# Patient Record
Sex: MALE | Race: WHITE | NOT HISPANIC OR LATINO | Employment: PART TIME | URBAN - METROPOLITAN AREA
[De-identification: names, ages, dates, MRNs, and addresses within clinical notes are randomized per-mention and may not be internally consistent; named-entity substitution may affect disease eponyms.]

---

## 2018-05-22 LAB
ABSOL LYMPHOCYTES (HISTORICAL): 2.5 K/UL (ref 0.5–4)
ALBUMIN SERPL BCP-MCNC: 4.3 G/DL (ref 3–5.2)
ALP SERPL-CCNC: 65 U/L (ref 43–122)
ALT SERPL W P-5'-P-CCNC: 34 U/L (ref 9–52)
ANION GAP SERPL CALCULATED.3IONS-SCNC: 10 MMOL/L (ref 5–14)
AST SERPL W P-5'-P-CCNC: 15 U/L (ref 17–59)
BASOPHILS # BLD AUTO: 0 K/UL (ref 0–0.1)
BASOPHILS # BLD AUTO: 1 % (ref 0–1)
BILIRUB SERPL-MCNC: 0.6 MG/DL
BUN SERPL-MCNC: 20 MG/DL (ref 5–25)
CALCIUM SERPL-MCNC: 9.5 MG/DL (ref 8.4–10.2)
CHLORIDE SERPL-SCNC: 104 MEQ/L (ref 97–108)
CO2 SERPL-SCNC: 28 MMOL/L (ref 22–30)
CREATINE, SERUM (HISTORICAL): 0.86 MG/DL (ref 0.7–1.5)
DEPRECATED RDW RBC AUTO: 14.3 %
EGFR (HISTORICAL): >60 ML/MIN/1.73 M2
EOSINOPHIL # BLD AUTO: 0.2 K/UL (ref 0–0.4)
EOSINOPHIL NFR BLD AUTO: 2 % (ref 0–6)
ERYTHROCYTE SEDIMENTATION RATE (HISTORICAL): 13 MM (ref 1–20)
GLUCOSE SERPL-MCNC: 110 MG/DL (ref 70–99)
HCT VFR BLD AUTO: 44.1 % (ref 41–53)
HGB BLD-MCNC: 14.5 G/DL (ref 13.5–17.5)
LYMPHOCYTES NFR BLD AUTO: 34 % (ref 25–45)
MCH RBC QN AUTO: 31.3 PG (ref 26–34)
MCHC RBC AUTO-ENTMCNC: 32.9 % (ref 31–36)
MCV RBC AUTO: 95 FL (ref 80–100)
MONOCYTES # BLD AUTO: 0.6 K/UL (ref 0.2–0.9)
MONOCYTES NFR BLD AUTO: 8 % (ref 1–10)
NEUTROPHILS ABS COUNT (HISTORICAL): 4.2 K/UL (ref 1.8–7.8)
NEUTS SEG NFR BLD AUTO: 55 % (ref 45–65)
PLATELET # BLD AUTO: 202 K/MCL (ref 150–450)
POTASSIUM SERPL-SCNC: 5.1 MEQ/L (ref 3.6–5)
RBC # BLD AUTO: 4.64 M/MCL (ref 4.5–5.9)
SODIUM SERPL-SCNC: 141 MEQ/L (ref 137–147)
TOTAL PROTEIN (HISTORICAL): 7 G/DL (ref 5.9–8.4)
WBC # BLD AUTO: 7.4 K/MCL (ref 4.5–11)

## 2018-05-23 LAB
EST. AVERAGE GLUCOSE BLD GHB EST-MCNC: 180 MG/DL
HBA1C MFR BLD HPLC: 7.9 %

## 2018-05-24 LAB — B BURGDORFERI AB,IGM/WB (HISTORICAL): 0.18

## 2018-06-26 ENCOUNTER — OFFICE VISIT (OUTPATIENT)
Dept: FAMILY MEDICINE CLINIC | Facility: CLINIC | Age: 68
End: 2018-06-26
Payer: MEDICARE

## 2018-06-26 VITALS
WEIGHT: 239.6 LBS | HEART RATE: 71 BPM | TEMPERATURE: 96.9 F | DIASTOLIC BLOOD PRESSURE: 80 MMHG | HEIGHT: 76 IN | OXYGEN SATURATION: 97 % | BODY MASS INDEX: 29.18 KG/M2 | SYSTOLIC BLOOD PRESSURE: 140 MMHG

## 2018-06-26 DIAGNOSIS — N52.1 ERECTILE DISORDER DUE TO MEDICAL CONDITION IN MALE: Primary | ICD-10-CM

## 2018-06-26 DIAGNOSIS — E78.5 HYPERLIPIDEMIA, UNSPECIFIED HYPERLIPIDEMIA TYPE: ICD-10-CM

## 2018-06-26 PROCEDURE — 99213 OFFICE O/P EST LOW 20 MIN: CPT | Performed by: FAMILY MEDICINE

## 2018-06-26 RX ORDER — METFORMIN HYDROCHLORIDE 750 MG/1
TABLET, EXTENDED RELEASE ORAL
COMMUNITY
Start: 2017-06-27 | End: 2018-09-05 | Stop reason: SDUPTHER

## 2018-06-26 RX ORDER — ACYCLOVIR 50 MG/G
OINTMENT TOPICAL
COMMUNITY
Start: 2014-02-04 | End: 2020-12-23

## 2018-06-26 RX ORDER — VARDENAFIL HYDROCHLORIDE 20 MG/1
20 TABLET ORAL DAILY PRN
Qty: 10 TABLET | Refills: 0 | Status: SHIPPED | OUTPATIENT
Start: 2018-06-26 | End: 2019-11-13

## 2018-06-26 RX ORDER — AMLODIPINE BESYLATE 5 MG/1
TABLET ORAL EVERY 24 HOURS
COMMUNITY
Start: 2017-09-19 | End: 2018-06-30 | Stop reason: SDUPTHER

## 2018-06-26 RX ORDER — SIMVASTATIN 40 MG
TABLET ORAL EVERY 24 HOURS
COMMUNITY
Start: 2017-09-19 | End: 2018-06-26

## 2018-06-26 RX ORDER — GLIMEPIRIDE 4 MG/1
TABLET ORAL EVERY 12 HOURS
COMMUNITY
Start: 2017-04-10 | End: 2018-07-06 | Stop reason: SDUPTHER

## 2018-06-26 RX ORDER — ROSUVASTATIN CALCIUM 5 MG/1
5 TABLET, COATED ORAL DAILY
Qty: 90 TABLET | Refills: 3 | Status: SHIPPED | OUTPATIENT
Start: 2018-06-26 | End: 2018-11-09 | Stop reason: HOSPADM

## 2018-06-26 RX ORDER — LISINOPRIL 40 MG/1
TABLET ORAL
COMMUNITY
Start: 2017-09-19 | End: 2018-07-02 | Stop reason: SDUPTHER

## 2018-06-26 RX ORDER — VARDENAFIL HYDROCHLORIDE 20 MG/1
20 TABLET ORAL DAILY PRN
Qty: 10 TABLET | Refills: 0 | Status: SHIPPED | OUTPATIENT
Start: 2018-06-26 | End: 2018-11-06 | Stop reason: SDUPTHER

## 2018-06-26 RX ORDER — BLOOD-GLUCOSE METER
EACH MISCELLANEOUS
COMMUNITY
Start: 2016-03-14 | End: 2022-03-04 | Stop reason: CLARIF

## 2018-06-26 NOTE — PROGRESS NOTES
Assessment/Plan:      Diagnoses and all orders for this visit:    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  -     vardenafil (LEVITRA) 20 MG tablet; Take 1 tablet (20 mg total) by mouth daily as needed for erectile dysfunction  -     vardenafil (LEVITRA) 20 MG tablet; Take 1 tablet (20 mg total) by mouth daily as needed for erectile dysfunction    Hyperlipidemia, unspecified hyperlipidemia type  -     rosuvastatin (CRESTOR) 5 mg tablet; Take 1 tablet (5 mg total) by mouth daily    Other orders  -     Blood Glucose Monitoring Suppl (ACCU-CHEK JYOTI SMARTVIEW) w/Device KIT; use to check Blood Sugar as needed  -     glucose blood test strip; tests once or twice  daily  by fingerstick route  -     acyclovir (ZOVIRAX) 5 % ointment; every 3 (three) hours  -     amLODIPine (NORVASC) 5 mg tablet; every 24 hours  -     aspirin 81 MG tablet; every 24 hours  -     glimepiride (AMARYL) 4 mg tablet; Every 12 hours  -     sitaGLIPtin (JANUVIA) 100 mg tablet; every 24 hours  -     lisinopril (ZESTRIL) 40 mg tablet; TAKE 1 TABLET (40MG) BY ORAL ROUTE EVERY DAY  -     metFORMIN (GLUCOPHAGE-XR) 750 mg 24 hr tablet; take 2 tablet by oral route  every day  -     Discontinue: simvastatin (ZOCOR) 40 mg tablet; every 24 hours          Subjective:med re ck    Myalgias gone since stopping simva      Patient ID: Naty Clark  is a 79 y o  male      HPI    Review of Systems      Objective:     Physical Exam

## 2018-06-30 DIAGNOSIS — I10 ESSENTIAL HYPERTENSION: ICD-10-CM

## 2018-06-30 DIAGNOSIS — I10 ESSENTIAL HYPERTENSION: Primary | ICD-10-CM

## 2018-06-30 RX ORDER — AMLODIPINE BESYLATE 5 MG/1
TABLET ORAL
Qty: 90 TABLET | Refills: 0 | Status: SHIPPED | OUTPATIENT
Start: 2018-06-30 | End: 2018-06-30 | Stop reason: SDUPTHER

## 2018-06-30 RX ORDER — AMLODIPINE BESYLATE 5 MG/1
5 TABLET ORAL DAILY
Qty: 90 TABLET | Refills: 3 | Status: SHIPPED | OUTPATIENT
Start: 2018-06-30 | End: 2019-01-04 | Stop reason: SDUPTHER

## 2018-07-02 DIAGNOSIS — I10 ESSENTIAL HYPERTENSION: Primary | ICD-10-CM

## 2018-07-03 RX ORDER — LISINOPRIL 40 MG/1
TABLET ORAL
Qty: 90 TABLET | Refills: 0 | Status: SHIPPED | OUTPATIENT
Start: 2018-07-03 | End: 2018-09-29 | Stop reason: SDUPTHER

## 2018-07-06 DIAGNOSIS — E11.9 TYPE 2 DIABETES MELLITUS WITHOUT COMPLICATION, WITHOUT LONG-TERM CURRENT USE OF INSULIN (HCC): Primary | ICD-10-CM

## 2018-07-06 RX ORDER — GLIMEPIRIDE 4 MG/1
TABLET ORAL
Qty: 180 TABLET | Refills: 3 | Status: SHIPPED | OUTPATIENT
Start: 2018-07-06 | End: 2019-07-26 | Stop reason: SDUPTHER

## 2018-07-16 DIAGNOSIS — E11.9 TYPE 2 DIABETES MELLITUS WITHOUT COMPLICATION, UNSPECIFIED WHETHER LONG TERM INSULIN USE (HCC): Primary | ICD-10-CM

## 2018-07-16 RX ORDER — BLOOD SUGAR DIAGNOSTIC
STRIP MISCELLANEOUS
Qty: 100 EACH | Refills: 12 | Status: SHIPPED | OUTPATIENT
Start: 2018-07-16 | End: 2018-07-17 | Stop reason: SDUPTHER

## 2018-07-16 RX ORDER — SITAGLIPTIN 100 MG/1
TABLET, FILM COATED ORAL
Qty: 90 TABLET | Refills: 0 | Status: SHIPPED | OUTPATIENT
Start: 2018-07-16 | End: 2018-07-17 | Stop reason: SDUPTHER

## 2018-07-17 DIAGNOSIS — E11.9 TYPE 2 DIABETES MELLITUS WITHOUT COMPLICATION, UNSPECIFIED WHETHER LONG TERM INSULIN USE (HCC): ICD-10-CM

## 2018-08-14 ENCOUNTER — TELEPHONE (OUTPATIENT)
Dept: FAMILY MEDICINE CLINIC | Facility: CLINIC | Age: 68
End: 2018-08-14

## 2018-08-14 NOTE — TELEPHONE ENCOUNTER
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

## 2018-08-15 ENCOUNTER — TELEPHONE (OUTPATIENT)
Dept: FAMILY MEDICINE CLINIC | Facility: CLINIC | Age: 68
End: 2018-08-15

## 2018-08-15 NOTE — TELEPHONE ENCOUNTER
Pt was at the foot doctor they are stating that he has cellulitis pt  Is on antibiotic cephalexin 500mg for 10 days four a day he was told to follow up with dr Jhonna Perkins  I told patient he might need an apt he didn't want to schedule can you please [phone]

## 2018-09-05 ENCOUNTER — OFFICE VISIT (OUTPATIENT)
Dept: FAMILY MEDICINE CLINIC | Facility: CLINIC | Age: 68
End: 2018-09-05
Payer: MEDICARE

## 2018-09-05 VITALS
DIASTOLIC BLOOD PRESSURE: 80 MMHG | WEIGHT: 234.1 LBS | HEIGHT: 76 IN | SYSTOLIC BLOOD PRESSURE: 156 MMHG | RESPIRATION RATE: 16 BRPM | BODY MASS INDEX: 28.51 KG/M2

## 2018-09-05 DIAGNOSIS — L03.115 CELLULITIS OF RIGHT LOWER EXTREMITY: Primary | ICD-10-CM

## 2018-09-05 DIAGNOSIS — E11.9 TYPE 2 DIABETES MELLITUS WITHOUT COMPLICATION, UNSPECIFIED WHETHER LONG TERM INSULIN USE (HCC): ICD-10-CM

## 2018-09-05 PROCEDURE — G0008 ADMIN INFLUENZA VIRUS VAC: HCPCS

## 2018-09-05 PROCEDURE — 99213 OFFICE O/P EST LOW 20 MIN: CPT | Performed by: FAMILY MEDICINE

## 2018-09-05 PROCEDURE — 90662 IIV NO PRSV INCREASED AG IM: CPT

## 2018-09-05 RX ORDER — METFORMIN HYDROCHLORIDE 750 MG/1
TABLET, EXTENDED RELEASE ORAL
Qty: 180 TABLET | Refills: 3 | Status: SHIPPED | OUTPATIENT
Start: 2018-09-05 | End: 2019-09-25 | Stop reason: SDUPTHER

## 2018-09-05 NOTE — PATIENT INSTRUCTIONS
CONTINUE YOUR CARE WITH THE PODIATRIST  CONTINUE THE CURRENT MEDICINES FOR THE DIABETIC CONTROL  WE ARE LOOKING FOR AN AVERAGE FASTING BLOOD SUGAR AROUND 140 FOR YOU

## 2018-09-05 NOTE — PROGRESS NOTES
Assessment/Plan:    No problem-specific Assessment & Plan notes found for this encounter  Diagnoses and all orders for this visit:    Cellulitis of right lower extremity    Other orders  -     aspirin 81 MG tablet; Take 81 mg by mouth daily  -     Acetaminophen (TYLENOL) 325 MG CAPS; Take 650 mg by mouth every 6 (six) hours          Subjective:      Patient ID: Jessica Brownlee  is a 79 y o  male  [id number] [facility] [address] STAY FOR CELLULITIS OF THE RIGHT LOWER EXTREMITY  IT IS IMPROVED NICELY AND HE IS OFF ANTIBIOTICS NOW FOR SEVERAL DAYS  HIS BLOOD SUGARS WERE A BIT HIGH DURING THAT TREATMENT PROGRAM BUT NOW HAVE BEGUN TO IMPROVE  HE HAS NO CURRENT SYMPTOMS IN THE LEG AT THIS TIME  HE HAS A CHRONIC WOUND OF HIS RIGHT FOOT THAT HE IS FOLLOWING WITH THE PODIATRY PEOPLE          The following portions of the patient's history were reviewed and updated as appropriate: allergies, current medications, past family history, past medical history, past social history, past surgical history and problem list     Review of Systems      Objective:  Vitals:    09/05/18 1138   BP: 156/80   BP Location: Left arm   Patient Position: Sitting   Cuff Size: Standard   Resp: 16   Weight: 106 kg (234 lb 1 6 oz)   Height: 6' 4" (1 93 m)      Physical Exam   Skin:   CELLULITIS IS HEALED ; NO LOCAL INFECTION OF FOOT

## 2018-09-17 ENCOUNTER — TRANSCRIBE ORDERS (OUTPATIENT)
Dept: ADMINISTRATIVE | Facility: HOSPITAL | Age: 68
End: 2018-09-17

## 2018-09-17 DIAGNOSIS — L97.309 DIABETIC ULCER OF ANKLE (HCC): Primary | ICD-10-CM

## 2018-09-17 DIAGNOSIS — E11.622 DIABETIC ULCER OF ANKLE (HCC): Primary | ICD-10-CM

## 2018-09-18 ENCOUNTER — HOSPITAL ENCOUNTER (OUTPATIENT)
Dept: RADIOLOGY | Age: 68
Discharge: HOME | End: 2018-09-18
Payer: MEDICARE

## 2018-09-18 DIAGNOSIS — E11.622 DIABETIC ULCER OF ANKLE (HCC): ICD-10-CM

## 2018-09-18 DIAGNOSIS — L97.309 DIABETIC ULCER OF ANKLE (HCC): ICD-10-CM

## 2018-09-18 PROCEDURE — 73718 MRI LOWER EXTREMITY W/O DYE: CPT

## 2018-09-29 DIAGNOSIS — I10 ESSENTIAL HYPERTENSION: ICD-10-CM

## 2018-09-30 RX ORDER — LISINOPRIL 40 MG/1
TABLET ORAL
Qty: 90 TABLET | Refills: 3 | Status: SHIPPED | OUTPATIENT
Start: 2018-09-30 | End: 2019-01-04 | Stop reason: SDUPTHER

## 2018-11-06 ENCOUNTER — APPOINTMENT (EMERGENCY)
Dept: NON INVASIVE DIAGNOSTICS | Facility: HOSPITAL | Age: 68
DRG: 623 | End: 2018-11-06
Payer: MEDICARE

## 2018-11-06 ENCOUNTER — APPOINTMENT (EMERGENCY)
Dept: RADIOLOGY | Facility: HOSPITAL | Age: 68
DRG: 623 | End: 2018-11-06
Payer: MEDICARE

## 2018-11-06 ENCOUNTER — HOSPITAL ENCOUNTER (INPATIENT)
Facility: HOSPITAL | Age: 68
LOS: 2 days | Discharge: HOME | DRG: 623 | End: 2018-11-09
Attending: EMERGENCY MEDICINE | Admitting: INTERNAL MEDICINE
Payer: MEDICARE

## 2018-11-06 DIAGNOSIS — L03.115 CELLULITIS OF RIGHT LOWER EXTREMITY: Primary | ICD-10-CM

## 2018-11-06 DIAGNOSIS — I48.91 ATRIAL FIBRILLATION WITH RAPID VENTRICULAR RESPONSE (HCC): ICD-10-CM

## 2018-11-06 DIAGNOSIS — L89.893 PRESSURE INJURY OF RIGHT FOOT, STAGE 3 (HCC): ICD-10-CM

## 2018-11-06 DIAGNOSIS — I48.91 ATRIAL FIBRILLATION WITH RVR (HCC): ICD-10-CM

## 2018-11-06 DIAGNOSIS — E78.5 HYPERLIPIDEMIA, UNSPECIFIED HYPERLIPIDEMIA TYPE: Chronic | ICD-10-CM

## 2018-11-06 DIAGNOSIS — R91.1 RIGHT MIDDLE LOBE PULMONARY NODULE: ICD-10-CM

## 2018-11-06 PROBLEM — E11.40 TYPE 2 DIABETES MELLITUS WITH DIABETIC NEUROPATHY (HCC): Chronic | Status: ACTIVE | Noted: 2018-11-06

## 2018-11-06 PROBLEM — Z86.73 HISTORY OF TIA (TRANSIENT ISCHEMIC ATTACK): Status: ACTIVE | Noted: 2017-04-25

## 2018-11-06 PROBLEM — L03.90 CELLULITIS: Status: ACTIVE | Noted: 2018-11-06

## 2018-11-06 PROBLEM — N17.9 ACUTE KIDNEY INJURY (HCC): Status: ACTIVE | Noted: 2018-11-06

## 2018-11-06 LAB
ALBUMIN SERPL BCP-MCNC: 3.8 G/DL (ref 3.5–5)
ALP SERPL-CCNC: 79 U/L (ref 46–116)
ALT SERPL W P-5'-P-CCNC: 36 U/L (ref 12–78)
ANION GAP SERPL CALCULATED.3IONS-SCNC: 6 MMOL/L (ref 4–13)
APTT PPP: 29 SECONDS (ref 24–36)
AST SERPL W P-5'-P-CCNC: 14 U/L (ref 5–45)
ATRIAL RATE: 258 BPM
BASOPHILS # BLD AUTO: 0.04 THOUSANDS/ΜL (ref 0–0.1)
BASOPHILS NFR BLD AUTO: 0 % (ref 0–1)
BILIRUB SERPL-MCNC: 0.4 MG/DL (ref 0.2–1)
BUN SERPL-MCNC: 19 MG/DL (ref 5–25)
CALCIUM SERPL-MCNC: 9.2 MG/DL (ref 8.3–10.1)
CHLORIDE SERPL-SCNC: 105 MMOL/L (ref 100–108)
CO2 SERPL-SCNC: 25 MMOL/L (ref 21–32)
CREAT SERPL-MCNC: 1.36 MG/DL (ref 0.6–1.3)
EOSINOPHIL # BLD AUTO: 0.08 THOUSAND/ΜL (ref 0–0.61)
EOSINOPHIL NFR BLD AUTO: 1 % (ref 0–6)
ERYTHROCYTE [DISTWIDTH] IN BLOOD BY AUTOMATED COUNT: 13.7 % (ref 11.6–15.1)
GFR SERPL CREATININE-BSD FRML MDRD: 54 ML/MIN/1.73SQ M
GLUCOSE SERPL-MCNC: 127 MG/DL (ref 65–140)
GLUCOSE SERPL-MCNC: 163 MG/DL (ref 65–140)
HCT VFR BLD AUTO: 45 % (ref 36.5–49.3)
HGB BLD-MCNC: 14.5 G/DL (ref 12–17)
IMM GRANULOCYTES # BLD AUTO: 0.06 THOUSAND/UL (ref 0–0.2)
IMM GRANULOCYTES NFR BLD AUTO: 1 % (ref 0–2)
INR PPP: 1.07 (ref 0.86–1.17)
LYMPHOCYTES # BLD AUTO: 1.76 THOUSANDS/ΜL (ref 0.6–4.47)
LYMPHOCYTES NFR BLD AUTO: 13 % (ref 14–44)
MAGNESIUM SERPL-MCNC: 2.2 MG/DL (ref 1.6–2.6)
MCH RBC QN AUTO: 30.7 PG (ref 26.8–34.3)
MCHC RBC AUTO-ENTMCNC: 32.2 G/DL (ref 31.4–37.4)
MCV RBC AUTO: 95 FL (ref 82–98)
MONOCYTES # BLD AUTO: 0.65 THOUSAND/ΜL (ref 0.17–1.22)
MONOCYTES NFR BLD AUTO: 5 % (ref 4–12)
NEUTROPHILS # BLD AUTO: 10.58 THOUSANDS/ΜL (ref 1.85–7.62)
NEUTS SEG NFR BLD AUTO: 80 % (ref 43–75)
NRBC BLD AUTO-RTO: 0 /100 WBCS
NT-PROBNP SERPL-MCNC: 573 PG/ML
P AXIS: 0 DEGREES
PLATELET # BLD AUTO: 192 THOUSANDS/UL (ref 149–390)
PLATELET # BLD AUTO: 214 THOUSANDS/UL (ref 149–390)
PMV BLD AUTO: 10.7 FL (ref 8.9–12.7)
PMV BLD AUTO: 10.9 FL (ref 8.9–12.7)
POTASSIUM SERPL-SCNC: 3.9 MMOL/L (ref 3.5–5.3)
PROT SERPL-MCNC: 7.9 G/DL (ref 6.4–8.2)
PROTHROMBIN TIME: 14 SECONDS (ref 11.8–14.2)
QRS AXIS: 73 DEGREES
QRSD INTERVAL: 100 MS
QT INTERVAL: 288 MS
QTC INTERVAL: 418 MS
RBC # BLD AUTO: 4.72 MILLION/UL (ref 3.88–5.62)
SODIUM SERPL-SCNC: 136 MMOL/L (ref 136–145)
T WAVE AXIS: 7 DEGREES
TROPONIN I SERPL-MCNC: <0.02 NG/ML
VENTRICULAR RATE: 127 BPM
WBC # BLD AUTO: 13.17 THOUSAND/UL (ref 4.31–10.16)

## 2018-11-06 PROCEDURE — 85049 AUTOMATED PLATELET COUNT: CPT | Performed by: INTERNAL MEDICINE

## 2018-11-06 PROCEDURE — 1124F ACP DISCUSS-NO DSCNMKR DOCD: CPT | Performed by: INTERNAL MEDICINE

## 2018-11-06 PROCEDURE — 87040 BLOOD CULTURE FOR BACTERIA: CPT | Performed by: EMERGENCY MEDICINE

## 2018-11-06 PROCEDURE — 85025 COMPLETE CBC W/AUTO DIFF WBC: CPT | Performed by: EMERGENCY MEDICINE

## 2018-11-06 PROCEDURE — 93005 ELECTROCARDIOGRAM TRACING: CPT

## 2018-11-06 PROCEDURE — 93010 ELECTROCARDIOGRAM REPORT: CPT | Performed by: INTERNAL MEDICINE

## 2018-11-06 PROCEDURE — 83735 ASSAY OF MAGNESIUM: CPT | Performed by: EMERGENCY MEDICINE

## 2018-11-06 PROCEDURE — 71275 CT ANGIOGRAPHY CHEST: CPT

## 2018-11-06 PROCEDURE — 84484 ASSAY OF TROPONIN QUANT: CPT | Performed by: EMERGENCY MEDICINE

## 2018-11-06 PROCEDURE — 83880 ASSAY OF NATRIURETIC PEPTIDE: CPT | Performed by: EMERGENCY MEDICINE

## 2018-11-06 PROCEDURE — 93971 EXTREMITY STUDY: CPT

## 2018-11-06 PROCEDURE — 96375 TX/PRO/DX INJ NEW DRUG ADDON: CPT

## 2018-11-06 PROCEDURE — 96361 HYDRATE IV INFUSION ADD-ON: CPT

## 2018-11-06 PROCEDURE — 36415 COLL VENOUS BLD VENIPUNCTURE: CPT | Performed by: EMERGENCY MEDICINE

## 2018-11-06 PROCEDURE — 80053 COMPREHEN METABOLIC PANEL: CPT | Performed by: EMERGENCY MEDICINE

## 2018-11-06 PROCEDURE — 85610 PROTHROMBIN TIME: CPT | Performed by: EMERGENCY MEDICINE

## 2018-11-06 PROCEDURE — 85730 THROMBOPLASTIN TIME PARTIAL: CPT | Performed by: EMERGENCY MEDICINE

## 2018-11-06 PROCEDURE — 99285 EMERGENCY DEPT VISIT HI MDM: CPT

## 2018-11-06 PROCEDURE — 82948 REAGENT STRIP/BLOOD GLUCOSE: CPT

## 2018-11-06 PROCEDURE — 96365 THER/PROPH/DIAG IV INF INIT: CPT

## 2018-11-06 PROCEDURE — 99223 1ST HOSP IP/OBS HIGH 75: CPT | Performed by: INTERNAL MEDICINE

## 2018-11-06 RX ORDER — LISINOPRIL 20 MG/1
40 TABLET ORAL DAILY
Status: DISCONTINUED | OUTPATIENT
Start: 2018-11-07 | End: 2018-11-09 | Stop reason: HOSPADM

## 2018-11-06 RX ORDER — HEPARIN SODIUM 5000 [USP'U]/ML
5000 INJECTION, SOLUTION INTRAVENOUS; SUBCUTANEOUS EVERY 8 HOURS SCHEDULED
Status: DISCONTINUED | OUTPATIENT
Start: 2018-11-06 | End: 2018-11-07

## 2018-11-06 RX ORDER — AMLODIPINE BESYLATE 5 MG/1
5 TABLET ORAL DAILY
Status: DISCONTINUED | OUTPATIENT
Start: 2018-11-07 | End: 2018-11-09 | Stop reason: HOSPADM

## 2018-11-06 RX ORDER — SODIUM CHLORIDE, SODIUM LACTATE, POTASSIUM CHLORIDE, CALCIUM CHLORIDE 600; 310; 30; 20 MG/100ML; MG/100ML; MG/100ML; MG/100ML
100 INJECTION, SOLUTION INTRAVENOUS CONTINUOUS
Status: DISPENSED | OUTPATIENT
Start: 2018-11-06 | End: 2018-11-07

## 2018-11-06 RX ORDER — ACETAMINOPHEN 325 MG/1
650 TABLET ORAL EVERY 6 HOURS PRN
Status: DISCONTINUED | OUTPATIENT
Start: 2018-11-06 | End: 2018-11-09 | Stop reason: HOSPADM

## 2018-11-06 RX ORDER — PRAVASTATIN SODIUM 40 MG
40 TABLET ORAL
Status: DISCONTINUED | OUTPATIENT
Start: 2018-11-07 | End: 2018-11-08

## 2018-11-06 RX ORDER — CEPHALEXIN 500 MG/1
500 CAPSULE ORAL 3 TIMES DAILY
Refills: 0 | COMMUNITY
Start: 2018-10-09 | End: 2018-11-09 | Stop reason: HOSPADM

## 2018-11-06 RX ORDER — ASPIRIN 81 MG/1
81 TABLET, CHEWABLE ORAL DAILY
Status: DISCONTINUED | OUTPATIENT
Start: 2018-11-07 | End: 2018-11-09 | Stop reason: HOSPADM

## 2018-11-06 RX ORDER — DILTIAZEM HYDROCHLORIDE 5 MG/ML
20 INJECTION INTRAVENOUS ONCE
Status: COMPLETED | OUTPATIENT
Start: 2018-11-06 | End: 2018-11-06

## 2018-11-06 RX ADMIN — IOHEXOL 85 ML: 350 INJECTION, SOLUTION INTRAVENOUS at 15:24

## 2018-11-06 RX ADMIN — HEPARIN SODIUM 5000 UNITS: 5000 INJECTION INTRAVENOUS; SUBCUTANEOUS at 21:10

## 2018-11-06 RX ADMIN — DILTIAZEM HYDROCHLORIDE 30 MG: 30 TABLET, FILM COATED ORAL at 15:58

## 2018-11-06 RX ADMIN — SODIUM CHLORIDE 1000 ML: 0.9 INJECTION, SOLUTION INTRAVENOUS at 15:39

## 2018-11-06 RX ADMIN — METOPROLOL TARTRATE 25 MG: 25 TABLET, FILM COATED ORAL at 20:36

## 2018-11-06 RX ADMIN — VANCOMYCIN HYDROCHLORIDE 1500 MG: 10 INJECTION, POWDER, LYOPHILIZED, FOR SOLUTION INTRAVENOUS at 21:42

## 2018-11-06 RX ADMIN — CEFAZOLIN SODIUM 2000 MG: 2 SOLUTION INTRAVENOUS at 15:06

## 2018-11-06 RX ADMIN — SODIUM CHLORIDE, SODIUM LACTATE, POTASSIUM CHLORIDE, AND CALCIUM CHLORIDE 100 ML/HR: .6; .31; .03; .02 INJECTION, SOLUTION INTRAVENOUS at 21:08

## 2018-11-06 RX ADMIN — SODIUM CHLORIDE 1000 ML: 0.9 INJECTION, SOLUTION INTRAVENOUS at 14:33

## 2018-11-06 RX ADMIN — DILTIAZEM HYDROCHLORIDE 20 MG: 5 INJECTION INTRAVENOUS at 14:33

## 2018-11-06 NOTE — ED ATTENDING ATTESTATION
Joaquina Rice MD, saw and evaluated the patient  I have discussed the patient with the resident/non-physician practitioner and agree with the resident's/non-physician practitioner's findings, Plan of Care, and MDM as documented in the resident's/non-physician practitioner's note, except where noted  All available labs and Radiology studies were reviewed  At this point I agree with the current assessment done in the Emergency Department  I have conducted an independent evaluation of this patient a history and physical is as follows:    61-year-old man presenting with right lower extremity cellulitis  Notes redness and pain to the right lower extremity  Also found to be in new onset atrial fibrillation with rapid atrial fibrillation  Denies chest pain, shortness of breath, palpitations  Does note right calf pain  No prior history of DVT or PE  States has had fever at home  Vital signs reviewed  On examination the patient is awake and alert, in no acute distress  Head is atraumatic and normocephalic  Pupils equal and reactive bilaterally, normal conjunctiva  Oropharynx clear  Moist mucous membranes  Neck is supple  Heart tachycardic and irregular, no murmurs, rubs or gallops  Lungs clear to auscultation bilaterally with no respiratory distress  Abdomen soft, nontender, nondistended  Extremities with no edema and normal range of motion  There is mild right calf tenderness  There is erythema overlying the right shin consistent with cellulitis  Nonfocal neuro  Skin warm, dry, intact  Patient has generally received IV antibiotics for cellulitis in the past due to his diabetic history and failure of outpatient antibiotics, will start IV antibiotics and admit  In regards to the rapid atrial fibrillation we will get labs, venous duplex of the right lower extremity, and CT angio chest   Rate control with diltiazem        Critical Care Time  The patient presented with a condition in which there was a high probability of imminent or life-threatening deterioration, and critical care services (excluding separately billable procedures) totalled 30-74 minutes          Procedures

## 2018-11-06 NOTE — ED PROVIDER NOTES
ASSESSMENT AND PLAN    Shabbir Rico  is a 79 y o  male with a history of recurrent cellulitis of the right lower extremity, who presents with suspected cellulitis of the right ankle, but also found to be in atrial fibrillation with rapid ventricular response, with a heart rate between 120-140  On arrival, the patient is otherwise hemodynamically stable, normotensive, afebrile  His exam is noted below, concerning for cellulitis  -initial lab work unremarkable, except for mildly elevated white count  Creatinine is also slightly elevated, however does not meet acute kidney injury based on arrival criteria due to the patient's baseline creatinine appearing to be between 1 1-1 2   -due to the patient's calf tenderness, and new onset atrial fibrillation, a PE study was ordered to rule out pulmonary embolism, which was unremarkable  There was, however an incidental finding of a right middle lobe nodule  -the patient was initially given an IV bolus of Cardizem for rate control, which improved his heart rate to between   He was given a dose of oral Cardizem following this  -Ancef for treatment of his cellulitis  -saline bolus  -[id number] score is 3 - currently, the patient is not on any anticoagulation, and has no prior history of atrial fibrillation  -on re-evaluation, the patient's physical exam is improved, is heart rate is also improved, currently with most recent heart rate of 89    -plan is for admission to the medicine service for further treatment of his cellulitis, and management of his new onset rapid atrial fibrillation  I discussed this plan with the patient, he is agreeable  I discussed this case with the admitting medicine team     History  Chief Complaint   Patient presents with    Cellulitis     cellulitis of lower right leg since yesterday am  hx of cellulitis in the same area twice in july and august  has ulcer on right foot that has not resolved since july        This 62-year-old male with history of non-insulin-dependent diabetes, as well as recurrent cellulitis of the right ankle, who presents with right ankle pain, and redness, concerning for cellulitis  The patient also has a known history of 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, as well as hypertension  The patient states the symptoms started approximately 2 days ago, and he came to the emergency department because he has required IV antibiotics for the previous 4 episodes of cellulitis of his ankle that he has experienced  Currently, the patient is experiencing pain overlying the area of redness, however extending superiorly up the posterior aspect of the calf muscle  Regarding the patient's right foot wound, he had an MRI approximately 2 months ago for evaluation, which did not reveal signs of acute osteomyelitis  Currently, the patient denies any fevers, chills, chest pain, shortness of breath, lightheadedness, dizziness, chest palpitations, nausea, vomiting, diarrhea, or any new sensorineuro deficits of the lower or upper extremities  He denies any recent travel, recent hospitalizations, recent surgeries, recent antibiotic use  [id number]    Prior to Admission Medications   Prescriptions Last Dose Informant Patient Reported? Taking?    Acetaminophen (TYLENOL) 325 MG CAPS 11/6/2018 at 1000 Self Yes Yes   Sig: Take 650 mg by mouth every 6 (six) hours   Blood Glucose Monitoring Suppl (ACCU-CHEK JYOTI SMARTVIEW) w/Device KIT  Self Yes No   Sig: use to check Blood Sugar as needed   Misc Natural Products (GLUCOSAMINE CHOND DOUBLE STR PO)   Yes No   Sig: Take by mouth   Misc Natural Products (JOINT HEALTH) CAPS   Yes No   Sig: Take by mouth   acyclovir (ZOVIRAX) 5 % ointment Not Taking at Unknown time Self Yes No   Sig: every 3 (three) hours   amLODIPine (NORVASC) 5 mg tablet 11/6/2018 at 0800 Self No Yes   Sig: Take 1 tablet (5 mg total) by mouth daily   aspirin 81 MG tablet 11/6/2018 at 0800 Self Yes Yes   Sig: Take 81 mg by mouth daily   cephalexin (KEFLEX) 500 mg capsule   Yes No   Sig: Take 500 mg by mouth 3 (three) times a day   glimepiride (AMARYL) 4 mg tablet 2018 at 1800 Self No Yes   Sig: TAKE ONE TABLET BY MOUTH TWICE DAILY   glucose blood (ACCU-CHEK SMARTVIEW) test strip   No No   Si each by Other route as needed (once a day) for up to 90 days Use as instructed   lisinopril (ZESTRIL) 40 mg tablet 2018 at 0800  No Yes   Sig: TAKE 1 TABLET BY MOUTH DAILY   metFORMIN (GLUCOPHAGE-XR) 750 mg 24 hr tablet 2018 at 1800  No Yes   Sig: ONE BID   rosuvastatin (CRESTOR) 5 mg tablet 2018 at 0800 Self No Yes   Sig: Take 1 tablet (5 mg total) by mouth daily   sitaGLIPtin (JANUVIA) 100 mg tablet  Self No No   Sig: Take 1 tablet (100 mg total) by mouth daily for 90 days   vardenafil (LEVITRA) 20 MG tablet More than a month at Unknown time Self No No   Sig: Take 1 tablet (20 mg total) by mouth daily as needed for erectile dysfunction      Facility-Administered Medications: None       Past Medical History:   Diagnosis Date    Atrial fibrillation ([place] [address] )     Diagnosed 2018    Benign prostate hyperplasia 2002    Colon polyp     Diabetes mellitus ([facility] )     History of TIA (transient ischemic attack) 2017    Hyperlipidemia 5/15/2014    Hypertension     Osteomyelitis ([place] [address] )     right great toe    Right middle lobe pulmonary nodule 2018    Type 2 diabetes mellitus with diabetic neuropathy ([facility] ) 2018       Past Surgical History:   Procedure Laterality Date    CLUB FOOT RELEASE Bilateral     TOE AMPUTATION Right     great toe    VASECTOMY         Family History   Problem Relation Age of Onset    Diabetes Father         mellitus     I have reviewed and agree with the history as documented      Social History   Substance Use Topics    Smoking status: Former Smoker     Types: Cigarettes     Quit date: 1988    Smokeless tobacco: Former User    Alcohol use Yes      Comment: rarely Review of Systems   Constitutional: Negative for chills and fever  HENT: Negative for congestion and sinus pain  Eyes: Negative for photophobia and visual disturbance  Respiratory: Negative for cough and shortness of breath  Cardiovascular: Negative for chest pain and palpitations  Gastrointestinal: Negative for abdominal pain, diarrhea, nausea and vomiting  Genitourinary: Negative for dysuria and hematuria  Musculoskeletal: Negative for neck pain and neck stiffness  Skin: Negative for pallor and rash  Neurological: Negative for light-headedness and headaches  Physical Exam  ED Triage Vitals [11/06/18 1133]   Temperature Pulse Respirations Blood Pressure SpO2   99 °F (37 2 °C) 85 18 (!) 143/112 99 %      Temp Source Heart Rate Source Patient Position - Orthostatic VS BP Location FiO2 (%)   Oral Monitor Sitting Right arm --      Pain Score       No Pain           Orthostatic Vital Signs  Vitals:    11/08/18 0331 11/08/18 0820 11/08/18 1200 11/08/18 1456   BP: 119/80 132/87 133/88 111/71   Pulse: 83 97 91 94   Patient Position - Orthostatic VS: Lying Lying Sitting Lying       Physical Exam   Constitutional: He is oriented to person, place, and time  Awake alert, well-appearing, no acute distress  Nontoxic in appearance  Appears stated age   HENT:   Head: Normocephalic and atraumatic  Mouth/Throat: Oropharynx is clear and moist  No oropharyngeal exudate  Eyes: Pupils are equal, round, and reactive to light  No scleral icterus  Neck: Normal range of motion  No JVD present  Cardiovascular: Normal heart sounds  No murmur heard  Tachycardic, irregular rhythm  Pulmonary/Chest: Effort normal  No respiratory distress  He has no wheezes  He has no rales  Abdominal: Soft  He exhibits no distension  There is no tenderness     Musculoskeletal:   There is erythema over the anterior aspect of the right lower extremity, which is blanching, which does not improve with elevation of the foot   There is overlying tenderness, without induration or fluctuance appreciated  There is tenderness to palpation extending superior to the area of erythema, at the posterior aspect of the calf    There is a chronic appearing wound over the lateral aspect of the plantar foot, without tenderness or surrounding erythema   Neurological: He is alert and oriented to person, place, and time  He exhibits normal muscle tone  Skin: Skin is warm and dry  No rash noted  No pallor  ED Medications  Medications   acetaminophen (TYLENOL) tablet 650 mg (not administered)   amLODIPine (NORVASC) tablet 5 mg (5 mg Oral Given 11/8/18 0842)   aspirin chewable tablet 81 mg (81 mg Oral Given 11/8/18 0842)   lisinopril (ZESTRIL) tablet 40 mg (40 mg Oral Given 11/8/18 0842)   lactated ringers infusion (0 mL/hr Intravenous Stopped 11/7/18 0728)   insulin lispro (HumaLOG) 100 units/mL subcutaneous injection 1-6 Units (2 Units Subcutaneous Given 11/8/18 1317)   vancomycin (VANCOCIN) 1,500 mg in sodium chloride 0 9 % 250 mL IVPB (1,500 mg Intravenous New Bag 11/8/18 1014)   apixaban (ELIQUIS) tablet 5 mg (5 mg Oral Given 11/8/18 0842)   atorvastatin (LIPITOR) tablet 40 mg (not administered)   metoprolol tartrate (LOPRESSOR) tablet 50 mg (not administered)   sodium chloride 0 9 % bolus 1,000 mL (0 mL Intravenous Stopped 11/6/18 1900)   ceFAZolin (ANCEF) IVPB (premix) 2,000 mg (0 mg Intravenous Stopped 11/6/18 1536)   diltiazem (CARDIZEM) injection 20 mg (20 mg Intravenous Given 11/6/18 1433)   diltiazem (CARDIZEM) tablet 30 mg (30 mg Oral Given 11/6/18 1558)   iohexol (OMNIPAQUE) 350 MG/ML injection (MULTI-DOSE) 85 mL (85 mL Intravenous Given 11/6/18 1524)       Diagnostic Studies  Results Reviewed     Procedure Component Value Units Date/Time    Blood culture #1 [[id number]] Collected:  11/06/18 1424    Lab Status:  Preliminary result Specimen:  Blood from Line, Venous Updated:  11/08/18 1601     Blood Culture No Growth at 48 hrs  Blood culture #2 [[id number]] Collected:  11/06/18 1424    Lab Status:  Preliminary result Specimen:  Blood from Arm, Right Updated:  11/08/18 1601     Blood Culture No Growth at 48 hrs  Basic metabolic panel [[id number]]  (Abnormal) Collected:  11/08/18 0505    Lab Status:  Final result Specimen:  Blood from Arm, Right Updated:  11/08/18 9253     Sodium 136 mmol/L      Potassium 4 4 mmol/L      Chloride 105 mmol/L      CO2 26 mmol/L      ANION GAP 5 mmol/L      BUN 16 mg/dL      Creatinine 1 10 mg/dL      Glucose 152 (H) mg/dL      Calcium 8 8 mg/dL      eGFR 70 ml/min/1 73sq m     Narrative:         National Kidney Disease Education Program recommendations are as follows:  GFR calculation is accurate only with a steady state creatinine  Chronic Kidney disease less than 60 ml/min/1 73 sq  meters  Kidney failure less than 15 ml/min/1 73 sq  meters      Lipid Panel with Direct LDL reflex [[id number]]  (Abnormal) Collected:  11/08/18 0505    Lab Status:  Final result Specimen:  Blood from Arm, Right Updated:  11/08/18 0614     Cholesterol 140 mg/dL      Triglycerides 148 mg/dL      HDL, Direct 38 (L) mg/dL      LDL Calculated 72 mg/dL     CBC and differential [[id number]]  (Abnormal) Collected:  11/08/18 0505    Lab Status:  Final result Specimen:  Blood from Arm, Right Updated:  11/08/18 0534     WBC 6 59 Thousand/uL      RBC 4 30 Million/uL      Hemoglobin 13 1 g/dL      Hematocrit 41 2 %      MCV 96 fL      MCH 30 5 pg      MCHC 31 8 g/dL      RDW 13 8 %      MPV 11 1 fL      Platelets 988 Thousands/uL      nRBC 0 /100 WBCs      Neutrophils Relative 59 %      Immat GRANS % 0 %      Lymphocytes Relative 22 %      Monocytes Relative 14 (H) %      Eosinophils Relative 4 %      Basophils Relative 1 %      Neutrophils Absolute 3 91 Thousands/µL      Immature Grans Absolute 0 02 Thousand/uL      Lymphocytes Absolute 1 45 Thousands/µL      Monocytes Absolute 0 93 Thousand/µL      Eosinophils Absolute 0 23 Thousand/µL Basophils Absolute 0 05 Thousands/µL     Hepatitis C antibody [[id number]]  (Normal) Collected:  11/07/18 1148    Lab Status:  Final result Specimen:  Blood from Arm, Left Updated:  11/07/18 1325     Hepatitis C Ab Non-reactive    Fingerstick Glucose (POCT) [[id number]]  (Abnormal) Collected:  11/07/18 1108    Lab Status:  Final result Updated:  11/07/18 1109     POC Glucose 154 (H) mg/dl     TSH, 3rd generation with Free T4 reflex [[id number]]  (Normal) Collected:  11/07/18 0830    Lab Status:  Final result Specimen:  Blood from Arm, Right Updated:  11/07/18 0901     TSH 3RD GENERATON 1 880 uIU/mL     Narrative:         Patients undergoing fluorescein dye angiography may retain small amounts of fluorescein in the body for 48-72 hours post procedure  Samples containing fluorescein can produce falsely depressed TSH values  If the patient had this procedure,a specimen should be resubmitted post fluorescein clearance      Hemoglobin A1C w/ EAG Estimation [[id number]]  (Abnormal) Collected:  11/07/18 0544    Lab Status:  Final result Specimen:  Blood from Arm, Left Updated:  11/07/18 0741     Hemoglobin A1C 8 1 (H) %       mg/dl     Fingerstick Glucose (POCT) [[id number]]  (Abnormal) Collected:  11/07/18 0653    Lab Status:  Final result Updated:  11/07/18 0655     POC Glucose 155 (H) mg/dl     Basic metabolic panel [[id number]]  (Abnormal) Collected:  11/07/18 0544    Lab Status:  Final result Specimen:  Blood from Arm, Left Updated:  11/07/18 0645     Sodium 134 (L) mmol/L      Potassium 4 0 mmol/L      Chloride 103 mmol/L      CO2 23 mmol/L      ANION GAP 8 mmol/L      BUN 13 mg/dL      Creatinine 1 10 mg/dL      Glucose 153 (H) mg/dL      Glucose, Fasting 153 (H) mg/dL      Calcium 8 7 mg/dL      eGFR 70 ml/min/1 73sq m     Narrative:         National Kidney Disease Education Program recommendations are as follows:  GFR calculation is accurate only with a steady state creatinine  Chronic Kidney disease less than 60 ml/min/1 73 sq  meters  Kidney failure less than 15 ml/min/1 73 sq  meters  Magnesium [[id number]]  (Normal) Collected:  11/07/18 0544    Lab Status:  Final result Specimen:  Blood from Arm, Left Updated:  11/07/18 0645     Magnesium 1 9 mg/dL     CBC (With Platelets) [[id number]]  (Abnormal) Collected:  11/07/18 0544    Lab Status:  Final result Specimen:  Blood from Arm, Left Updated:  11/07/18 0631     WBC 10 58 (H) Thousand/uL      RBC 4 17 Million/uL      Hemoglobin 12 7 g/dL      Hematocrit 39 9 %      MCV 96 fL      MCH 30 5 pg      MCHC 31 8 g/dL      RDW 14 1 %      Platelets 878 Thousands/uL      MPV 10 9 fL     Platelet count [[id number]]  (Normal) Collected:  11/06/18 2108    Lab Status:  Final result Specimen:  Blood from Arm, Left Updated:  11/06/18 2125     Platelets 979 Thousands/uL      MPV 10 9 fL     Fingerstick Glucose (POCT) [[id number]]  (Normal) Collected:  11/06/18 1716    Lab Status:  Final result Updated:  11/06/18 1748     POC Glucose 127 mg/dl     Comprehensive metabolic panel [[id number]]  (Abnormal) Collected:  11/06/18 1423    Lab Status:  Final result Specimen:  Blood from Arm, Left Updated:  11/06/18 1454     Sodium 136 mmol/L      Potassium 3 9 mmol/L      Chloride 105 mmol/L      CO2 25 mmol/L      ANION GAP 6 mmol/L      BUN 19 mg/dL      Creatinine 1 36 (H) mg/dL      Glucose 163 (H) mg/dL      Calcium 9 2 mg/dL      AST 14 U/L      ALT 36 U/L      Alkaline Phosphatase 79 U/L      Total Protein 7 9 g/dL      Albumin 3 8 g/dL      Total Bilirubin 0 40 mg/dL      eGFR 54 ml/min/1 73sq m     Narrative:         National Kidney Disease Education Program recommendations are as follows:  GFR calculation is accurate only with a steady state creatinine  Chronic Kidney disease less than 60 ml/min/1 73 sq  meters  Kidney failure less than 15 ml/min/1 73 sq  meters      BNP [[id number]]  (Abnormal) Collected:  11/06/18 1423    Lab Status:  Final result Specimen:  Blood from Arm, Left Updated: 11/06/18 1454     NT-proBNP 573 (H) pg/mL     Magnesium [[id number]]  (Normal) Collected:  11/06/18 1423    Lab Status:  Final result Specimen:  Blood from Arm, Left Updated:  11/06/18 1454     Magnesium 2 2 mg/dL     Troponin I [[id number]]  (Normal) Collected:  11/06/18 1423    Lab Status:  Final result Specimen:  Blood from Arm, Left Updated:  11/06/18 1452     Troponin I <0 02 ng/mL     Protime-INR [[id number]]  (Normal) Collected:  11/06/18 1423    Lab Status:  Final result Specimen:  Blood from Arm, Left Updated:  11/06/18 1447     Protime 14 0 seconds      INR 1 07    APTT [[id number]]  (Normal) Collected:  11/06/18 1423    Lab Status:  Final result Specimen:  Blood from Arm, Left Updated:  11/06/18 1447     PTT 29 seconds     CBC and differential [[id number]]  (Abnormal) Collected:  11/06/18 1423    Lab Status:  Final result Specimen:  Blood from Arm, Left Updated:  11/06/18 1442     WBC 13 17 (H) Thousand/uL      RBC 4 72 Million/uL      Hemoglobin 14 5 g/dL      Hematocrit 45 0 %      MCV 95 fL      MCH 30 7 pg      MCHC 32 2 g/dL      RDW 13 7 %      MPV 10 7 fL      Platelets 450 Thousands/uL      nRBC 0 /100 WBCs      Neutrophils Relative 80 (H) %      Immat GRANS % 1 %      Lymphocytes Relative 13 (L) %      Monocytes Relative 5 %      Eosinophils Relative 1 %      Basophils Relative 0 %      Neutrophils Absolute 10 58 (H) Thousands/µL      Immature Grans Absolute 0 06 Thousand/uL      Lymphocytes Absolute 1 76 Thousands/µL      Monocytes Absolute 0 65 Thousand/µL      Eosinophils Absolute 0 08 Thousand/µL      Basophils Absolute 0 04 Thousands/µL                  XR foot 3+ vw right   Final Result by Michael Grande MD (11/08 0406)      No radiographic evidence of osteomyelitis              Workstation performed: [id number]         VAS lower limb venous duplex study, unilateral/limited   Final Result by Carson Guevara MD (11/07 2048)      CTA ED chest PE study   Final Result by Ramirez Lawson MD (11/06 1547)      No acute pathology  No pulmonary embolism  Right middle lobe 7 mm nodule  Mild COPD  Based on current Fleischner Society 2017 Guidelines on incidental pulmonary nodule, followup non-contrast CT is recommended at 6-12 months from the initial examination and, if stable at that time, an additional    followup is recommended for 18-24 months from the initial examination  Bilateral hilar adenopathy, of uncertain etiology and significance  This can be reevaluated on follow-up exam as above  Workstation performed: [id number]               Procedures  ECG 12 Lead Documentation  Date/Time: 11/8/2018 4:17 PM  Performed by: Yesica Carbone  Authorized by: Yesica Carbone     ECG reviewed by me, the ED Provider: yes    Patient location:  ED  Previous ECG:     Comparison to cardiac monitor: Yes    Interpretation:     Interpretation: abnormal    Comments:      Atrial fibrillation with a heart rate did 127, nonspecific T-wave changes in the inferior leads  No abnormal axis, normal intervals  No prior EKGs for comparison          Phone Consults  ED Phone Contact    ED Course       CHADS2 Score      Most Recent Value   CHADS2   CHF History  0 Filed at: 11/06/2018 1413   HTN History  1 Filed at: 11/06/2018 1413   Age 76+  0 Filed at: 11/06/2018 1413   Diabetes History  1 Filed at: 11/06/2018 1413   Stroke or TIA Symptoms Previously  0 Filed at: 11/06/2018 1413   CHADS2 Score  2 Filed at: 11/06/2018 1413          Identification of Seniors at Risk      Most Recent Value   (ISAR) Identification of Seniors at Risk   Before the illness or injury that brought you to the Emergency, did you need someone to help you on a regular basis? 0 Filed at: 11/06/2018 1135   In the last 24 hours, have you needed more help than usual?  0 Filed at: 11/06/2018 1135   Have you been hospitalized for one or more nights during the past 6 months? 1 Filed at: 11/06/2018 1135   In general, do you see well?   1 Filed at: 11/06/2018 1135   In general, do you have serious problems with your memory? 0 Filed at: 11/06/2018 1135   Do you take more than three different medications every day? 1 Filed at: 11/06/2018 1135   ISAR Score  3 Filed at: 11/06/2018 1135                          [facility]  CritCare Time    Disposition  Final diagnoses:   Cellulitis of right lower extremity   Atrial fibrillation with rapid ventricular response (HCC)   Right middle lobe pulmonary nodule   Pressure injury of right foot, stage 3 ([facility] )     Time reflects when diagnosis was documented in both MDM as applicable and the Disposition within this note     Time User Action Codes Description Comment    11/6/2018  4:08 PM Johnson Pyo Add [H08 177] Cellulitis of right lower extremity     11/6/2018  4:08 PM Johnson Pyo Add [I48 91] Atrial fibrillation with rapid ventricular response ([facility] )     11/6/2018  4:43 PM Johnson Pyo Add [R91 1] Right middle lobe pulmonary nodule     11/6/2018  8:39 PM Pedro Shafer Add [C41 890] Pressure injury of right foot, stage 3 ([facility] )     11/6/2018  8:39 PM Guero NUR Modify [W67 269] Pressure injury of right foot, stage 3 [facility])       ED Disposition     ED Disposition Condition Comment    Admit  Case was discussed with SOD and the patient's admission status was agreed to be Admission Status: observation status to the service of Dr Mega Marc           Follow-up Information     Follow up With Specialties Details Why Contact Info    Jon Shaikh DPM Podiatry Schedule an appointment as soon as possible for a visit in 1 week(s)  [address] [place] [address]  [phone]            Current Discharge Medication List      CONTINUE these medications which have NOT CHANGED    Details   Acetaminophen (TYLENOL) 325 MG CAPS Take 650 mg by mouth every 6 (six) hours      amLODIPine (NORVASC) 5 mg tablet Take 1 tablet (5 mg total) by mouth daily  Qty: 90 tablet, Refills: 3    Associated Diagnoses: Essential hypertension      aspirin 81 MG tablet Take 81 mg by mouth daily      glimepiride (AMARYL) 4 mg tablet TAKE ONE TABLET BY MOUTH TWICE DAILY  Qty: 180 tablet, Refills: 3    Associated Diagnoses: Type 2 diabetes mellitus without complication, without long-term current use of insulin ([facility])      lisinopril (ZESTRIL) 40 mg tablet TAKE 1 TABLET BY MOUTH DAILY  Qty: 90 tablet, Refills: 3    Associated Diagnoses: Essential hypertension      metFORMIN (GLUCOPHAGE-XR) 750 mg 24 hr tablet ONE BID  Qty: 180 tablet, Refills: 3    Associated Diagnoses: Type 2 diabetes mellitus without complication, unspecified whether long term insulin use (HCC)      rosuvastatin (CRESTOR) 5 mg tablet Take 1 tablet (5 mg total) by mouth daily  Qty: 90 tablet, Refills: 3    Associated Diagnoses: Hyperlipidemia, unspecified hyperlipidemia type      acyclovir (ZOVIRAX) 5 % ointment every 3 (three) hours      Blood Glucose Monitoring Suppl (ACCU-CHEK JYOTI SMARTVIEW) w/Device KIT use to check Blood Sugar as needed      cephalexin (KEFLEX) 500 mg capsule Take 500 mg by mouth 3 (three) times a day  Refills: 0      glucose blood (ACCU-CHEK SMARTVIEW) test strip 1 each by Other route as needed (once a day) for up to 90 days Use as instructed  Qty: 100 each, Refills: 12    Associated Diagnoses: Type 2 diabetes mellitus without complication, unspecified whether long term insulin use (HCC)      Misc Natural Products (GLUCOSAMINE CHOND DOUBLE STR PO) Take by mouth      Misc Natural Products (JOINT HEALTH) CAPS Take by mouth      sitaGLIPtin (JANUVIA) 100 mg tablet Take 1 tablet (100 mg total) by mouth daily for 90 days  Qty: 90 tablet, Refills: 3    Associated Diagnoses: Type 2 diabetes mellitus without complication, unspecified whether long term insulin use ([facility])      vardenafil (LEVITRA) 20 MG tablet Take 1 tablet (20 mg total) by mouth daily as needed for erectile dysfunction  Qty: 10 tablet, Refills: 0    Associated Diagnoses: 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           No discharge procedures on file  ED Provider  Attending physically available and evaluated Will Antes    I managed the patient along with the ED Attending      Electronically Signed by         Estefania Smith MD  11/08/18 0867

## 2018-11-07 ENCOUNTER — APPOINTMENT (INPATIENT)
Dept: NON INVASIVE DIAGNOSTICS | Facility: HOSPITAL | Age: 68
DRG: 623 | End: 2018-11-07
Payer: MEDICARE

## 2018-11-07 ENCOUNTER — APPOINTMENT (INPATIENT)
Dept: RADIOLOGY | Facility: HOSPITAL | Age: 68
DRG: 623 | End: 2018-11-07
Payer: MEDICARE

## 2018-11-07 LAB
ANION GAP SERPL CALCULATED.3IONS-SCNC: 5 MMOL/L (ref 4–13)
ANION GAP SERPL CALCULATED.3IONS-SCNC: 8 MMOL/L (ref 4–13)
BUN SERPL-MCNC: 13 MG/DL (ref 5–25)
BUN SERPL-MCNC: 15 MG/DL (ref 5–25)
CALCIUM SERPL-MCNC: 8.7 MG/DL (ref 8.3–10.1)
CALCIUM SERPL-MCNC: 8.7 MG/DL (ref 8.3–10.1)
CHLORIDE SERPL-SCNC: 103 MMOL/L (ref 100–108)
CHLORIDE SERPL-SCNC: 105 MMOL/L (ref 100–108)
CO2 SERPL-SCNC: 23 MMOL/L (ref 21–32)
CO2 SERPL-SCNC: 26 MMOL/L (ref 21–32)
CREAT SERPL-MCNC: 1.08 MG/DL (ref 0.6–1.3)
CREAT SERPL-MCNC: 1.1 MG/DL (ref 0.6–1.3)
ERYTHROCYTE [DISTWIDTH] IN BLOOD BY AUTOMATED COUNT: 14.1 % (ref 11.6–15.1)
EST. AVERAGE GLUCOSE BLD GHB EST-MCNC: 186 MG/DL
GFR SERPL CREATININE-BSD FRML MDRD: 70 ML/MIN/1.73SQ M
GFR SERPL CREATININE-BSD FRML MDRD: 71 ML/MIN/1.73SQ M
GLUCOSE P FAST SERPL-MCNC: 153 MG/DL (ref 65–99)
GLUCOSE SERPL-MCNC: 131 MG/DL (ref 65–140)
GLUCOSE SERPL-MCNC: 135 MG/DL (ref 65–140)
GLUCOSE SERPL-MCNC: 149 MG/DL (ref 65–140)
GLUCOSE SERPL-MCNC: 153 MG/DL (ref 65–140)
GLUCOSE SERPL-MCNC: 154 MG/DL (ref 65–140)
GLUCOSE SERPL-MCNC: 155 MG/DL (ref 65–140)
HBA1C MFR BLD: 8.1 % (ref 4.2–6.3)
HCT VFR BLD AUTO: 39.9 % (ref 36.5–49.3)
HCV AB SER QL: NORMAL
HGB BLD-MCNC: 12.7 G/DL (ref 12–17)
MAGNESIUM SERPL-MCNC: 1.9 MG/DL (ref 1.6–2.6)
MAGNESIUM SERPL-MCNC: 2.3 MG/DL (ref 1.6–2.6)
MCH RBC QN AUTO: 30.5 PG (ref 26.8–34.3)
MCHC RBC AUTO-ENTMCNC: 31.8 G/DL (ref 31.4–37.4)
MCV RBC AUTO: 96 FL (ref 82–98)
PHOSPHATE SERPL-MCNC: 2.2 MG/DL (ref 2.3–4.1)
PLATELET # BLD AUTO: 188 THOUSANDS/UL (ref 149–390)
PMV BLD AUTO: 10.9 FL (ref 8.9–12.7)
POTASSIUM SERPL-SCNC: 3.9 MMOL/L (ref 3.5–5.3)
POTASSIUM SERPL-SCNC: 4 MMOL/L (ref 3.5–5.3)
RBC # BLD AUTO: 4.17 MILLION/UL (ref 3.88–5.62)
SODIUM SERPL-SCNC: 134 MMOL/L (ref 136–145)
SODIUM SERPL-SCNC: 136 MMOL/L (ref 136–145)
TSH SERPL DL<=0.05 MIU/L-ACNC: 1.88 UIU/ML (ref 0.36–3.74)
WBC # BLD AUTO: 10.58 THOUSAND/UL (ref 4.31–10.16)

## 2018-11-07 PROCEDURE — 82948 REAGENT STRIP/BLOOD GLUCOSE: CPT

## 2018-11-07 PROCEDURE — 84443 ASSAY THYROID STIM HORMONE: CPT | Performed by: STUDENT IN AN ORGANIZED HEALTH CARE EDUCATION/TRAINING PROGRAM

## 2018-11-07 PROCEDURE — 84100 ASSAY OF PHOSPHORUS: CPT | Performed by: STUDENT IN AN ORGANIZED HEALTH CARE EDUCATION/TRAINING PROGRAM

## 2018-11-07 PROCEDURE — 83735 ASSAY OF MAGNESIUM: CPT | Performed by: INTERNAL MEDICINE

## 2018-11-07 PROCEDURE — 83036 HEMOGLOBIN GLYCOSYLATED A1C: CPT | Performed by: INTERNAL MEDICINE

## 2018-11-07 PROCEDURE — 36415 COLL VENOUS BLD VENIPUNCTURE: CPT | Performed by: INTERNAL MEDICINE

## 2018-11-07 PROCEDURE — 83735 ASSAY OF MAGNESIUM: CPT | Performed by: STUDENT IN AN ORGANIZED HEALTH CARE EDUCATION/TRAINING PROGRAM

## 2018-11-07 PROCEDURE — 80048 BASIC METABOLIC PNL TOTAL CA: CPT | Performed by: STUDENT IN AN ORGANIZED HEALTH CARE EDUCATION/TRAINING PROGRAM

## 2018-11-07 PROCEDURE — 86803 HEPATITIS C AB TEST: CPT | Performed by: STUDENT IN AN ORGANIZED HEALTH CARE EDUCATION/TRAINING PROGRAM

## 2018-11-07 PROCEDURE — 93971 EXTREMITY STUDY: CPT | Performed by: SURGERY

## 2018-11-07 PROCEDURE — 99232 SBSQ HOSP IP/OBS MODERATE 35: CPT | Performed by: INTERNAL MEDICINE

## 2018-11-07 PROCEDURE — 93005 ELECTROCARDIOGRAM TRACING: CPT

## 2018-11-07 PROCEDURE — 93306 TTE W/DOPPLER COMPLETE: CPT

## 2018-11-07 PROCEDURE — 73630 X-RAY EXAM OF FOOT: CPT

## 2018-11-07 PROCEDURE — 85027 COMPLETE CBC AUTOMATED: CPT | Performed by: INTERNAL MEDICINE

## 2018-11-07 PROCEDURE — 80048 BASIC METABOLIC PNL TOTAL CA: CPT | Performed by: INTERNAL MEDICINE

## 2018-11-07 RX ADMIN — AMLODIPINE BESYLATE 5 MG: 5 TABLET ORAL at 08:27

## 2018-11-07 RX ADMIN — HEPARIN SODIUM 5000 UNITS: 5000 INJECTION INTRAVENOUS; SUBCUTANEOUS at 05:33

## 2018-11-07 RX ADMIN — INSULIN LISPRO 1 UNITS: 100 INJECTION, SOLUTION INTRAVENOUS; SUBCUTANEOUS at 07:31

## 2018-11-07 RX ADMIN — METOPROLOL TARTRATE 25 MG: 25 TABLET, FILM COATED ORAL at 22:11

## 2018-11-07 RX ADMIN — LISINOPRIL 40 MG: 20 TABLET ORAL at 08:27

## 2018-11-07 RX ADMIN — APIXABAN 5 MG: 5 TABLET, FILM COATED ORAL at 13:02

## 2018-11-07 RX ADMIN — Medication 81 MG: at 08:27

## 2018-11-07 RX ADMIN — APIXABAN 5 MG: 5 TABLET, FILM COATED ORAL at 17:49

## 2018-11-07 RX ADMIN — INSULIN LISPRO 1 UNITS: 100 INJECTION, SOLUTION INTRAVENOUS; SUBCUTANEOUS at 11:09

## 2018-11-07 RX ADMIN — PRAVASTATIN SODIUM 40 MG: 40 TABLET ORAL at 17:49

## 2018-11-07 RX ADMIN — VANCOMYCIN HYDROCHLORIDE 1500 MG: 10 INJECTION, POWDER, LYOPHILIZED, FOR SOLUTION INTRAVENOUS at 22:16

## 2018-11-07 RX ADMIN — METOPROLOL TARTRATE 25 MG: 25 TABLET, FILM COATED ORAL at 08:27

## 2018-11-07 RX ADMIN — VANCOMYCIN HYDROCHLORIDE 1500 MG: 10 INJECTION, POWDER, LYOPHILIZED, FOR SOLUTION INTRAVENOUS at 09:40

## 2018-11-07 NOTE — ED NOTES
Pt made aware of hold status - pt repositioned for comfort  No needs at this time   Call bell in reach     Ricardo Fulton RN  11/06/18 0775

## 2018-11-07 NOTE — UTILIZATION REVIEW
[facility] Utilization Review Department  Phone: [phone]; Fax [phone]  [email or web address]  ATTENTION: Please call with any questions or concerns to [phone]  and carefully listen to the prompts so that you are directed to the right person  Send all requests for admission clinical reviews, approved or denied determinations and any other requests to fax [phone]  All voicemails are confidential     ==========================================================================    Initial Clinical Review    Admission: Date/Time/Statement: 11/06/2018 @ 1608  Orders Placed This Encounter   Procedures    Place in Observation (expected length of stay for this patient is less than two midnights)     Standing Status:   Standing     Number of Occurrences:   1     Order Specific Question:   Admitting Physician     Answer:   Marshall Hogue     Order Specific Question:   Level of Care     Answer:   Med Surg [16]         ED: Date/Time/Mode of Arrival:   ED Arrival Information     Expected Arrival Acuity Means of Arrival Escorted By Service Admission Type    - 11/6/2018 11:21 Urgent Walk-In Self General Medicine Urgent    Arrival Complaint    cellulitis          Chief Complaint:   Chief Complaint   Patient presents with    Cellulitis     cellulitis of lower right leg since yesterday am  hx of cellulitis in the same area twice in july and august  has ulcer on right foot that has not resolved since july  History of Illness:   Kaylan Falcon  is a 79 y o  male with history of recurrent cellulitis, type 2 diabetes, and diabetic neuropathy who initially presented to the ED with a complaint of recurrent right lower extremity cellulitis  History of recurrent cellulitis for which he has been treated with IV antibiotics several times in the past   He says he knows recurrence of his right lower extremity rash yesterday night, and then came to the ED today  However, he was also noted to be in atrial fibrillation with rapid ventricular response on arrival   He has no known history of AFib  There was also an incidental finding of right lower lobe nodule on a PE study  ED Vital Signs:   ED Triage Vitals [18 1133]   Temperature Pulse Respirations Blood Pressure SpO2   99 °F (37 2 °C) 85 18 (!) 143/112 99 %      Temp Source Heart Rate Source Patient Position - Orthostatic VS BP Location FiO2 (%)   Oral Monitor Sitting Right arm --      Pain Score       No Pain        Wt Readings from Last 1 Encounters:   18 108 kg (238 lb)     Abnormal Labs/Diagnostic Test Results:   WBC Thousand/uL  --  13 17*   RBC Million/uL  --  4 72   HEMOGLOBIN g/dL  --  14 5   HEMATOCRIT %  --  45 0   Sodium  136  POTASSIUM mmol/L 3 9   CHLORIDE mmol/L 105   CO2 mmol/L 25   BUN mg/dL 19   CREATININE mg/dL 1 36*   CALCIUM mg/dL 9 2   AST U/L 14   ALT U/L 36   ALK PHOS U/L 79   EGFR ml/min/1 73sq m 54     NT-proBNP 573 (H) <125 pg/mL     PT 14 0 / INR 1 07    Trop <0 02    CTa chest:  No acute pathology   No pulmonary embolism  Right middle lobe 7 mm nodule   Mild COPD   Based on current Fleischner Society 2017 Guidelines on incidental pulmonary nodule, followup non-contrast CT is recommended at 6-12 months from the initial examination and, if stable at that time, an additional followup is recommended for 18-24 months from the initial examination  Bilateral hilar adenopathy, of uncertain etiology and significance       EC, A  Fib with RVR, Incomplete RBBB    ED Treatment:   Medication Administration from 2018 1121 to 2018 0856       Date/Time Order Dose Route Action Action by Comments     2018 1900 sodium chloride 0 9 % bolus 1,000 mL 0 mL Intravenous Stopped Chadwick Fernandez, RN      2018 1433 sodium chloride 0 9 % bolus 1,000 mL 1,000 mL Intravenous [address] [facility] [address] Singh, RN      2018 1536 ceFAZolin (ANCEF) IVPB (premix) 2,000 mg 0 mg Intravenous Stopped Hussein Santacruz, RAVEN      11/06/2018 1506 ceFAZolin (ANCEF) IVPB (premix) 2,000 mg 2,000 mg Intravenous New [address],       11/06/2018 1433 diltiazem (CARDIZEM) injection 20 mg 20 mg Intravenous Given Onquang Santacruz, RAVEN      11/06/2018 1639 sodium chloride 0 9 % bolus 1,000 mL 0 mL Intravenous [facility], RN      11/06/2018 1539 sodium chloride 0 9 % bolus 1,000 mL 1,000 mL Intravenous New [address] [facility],       11/06/2018 1558 diltiazem (CARDIZEM) tablet 30 mg 30 mg Oral Given Hussein Santacruz RN      11/06/2018 1524 iohexol (OMNIPAQUE) 350 MG/ML injection (MULTI-DOSE) 85 mL 85 mL Intravenous Given Bob Nuñez      11/07/2018 0827 metoprolol tartrate (LOPRESSOR) tablet 25 mg 25 mg Oral Given Poppy Camilo, RN      11/06/2018 2036 metoprolol tartrate (LOPRESSOR) tablet 25 mg 25 mg Oral Given Shaun Ogden, RAVEN      11/07/2018 0827 amLODIPine (NORVASC) tablet 5 mg 5 mg Oral Given Poppy Camilo, RN      11/07/2018 0827 aspirin chewable tablet 81 mg 81 mg Oral Given Poppy Camilo, RN      11/07/2018 0827 lisinopril (ZESTRIL) tablet 40 mg 40 mg Oral Given Poppy Camilo, RN      11/07/2018 3957 lactated ringers infusion 0 mL/hr Intravenous Stopped Joy Goldberg, RAVEN      11/06/2018 2108 lactated ringers infusion 100 mL/hr Intravenous Dee [address] Shaun Ogden RN      11/07/2018 0533 heparin (porcine) subcutaneous injection 5,000 Units 5,000 Units Subcutaneous Given Araceli Brown RN      11/06/2018 2110 heparin (porcine) subcutaneous injection 5,000 Units 5,000 Units Subcutaneous Given Shaun Ogden RN      11/07/2018 0731 insulin lispro (HumaLOG) 100 units/mL subcutaneous injection 1-6 Units 1 Units Subcutaneous Given Poppy Camilo, RAVEN 155 blood glucose     11/06/2018 2321 vancomycin (VANCOCIN) 1,500 mg in sodium chloride 0 9 % 250 mL IVPB 0 mg/kg Intravenous Stopped Shaun Ogden RN      11/06/2018 6212 vancomycin (VANCOCIN) 1,500 mg in sodium chloride 0 9 % 250 mL IVPB 1,500 mg Intravenous New Bag Colin Herndon RN           Past Medical/Surgical History: Active Ambulatory Problems     Diagnosis Date Noted    Cellulitis of right lower extremity 09/05/2018     Past Medical History:   Diagnosis Date    At[facility] fibrillation ([facility] )     Benign prostate hyperplasia 04/2002    Colon polyp 2006    Diabetes mellitus ([place] [address] )     History of TIA (transient ischemic attack) 4/25/2017    Hyperlipidemia 5/15/2014    Hypertension     Right middle lobe pulmonary nodule 11/6/2018    Type 2 diabetes mellitus with diabetic neuropathy ([place] [address] ) 11/6/2018       Admitting Diagnosis: Cellulitis [L03 90]    Age/Sex: 79 y o  male    Assessment/Plan:   Atrial fibrillation with RVR  · Patient received 2 doses of Cardizem in the ED  Heart rate improved with 25 mg of metoprolol  · Telemetry monitoring  · Continue metoprolol every 12 hours  · Patient with CHADS2 Vasc score of 3, will need anticoagulation  · Echocardiogram     Right lower extremity cellulitis  · Possible source could be patient's right foot ulcer  · Patient failed outpatient antibiotics  · Continue IV vancomycin  · Follow-up blood cultures     Right foot ulcer  · Patient says he follows up with Dr Edilberto Yeung of Podiatry as an outpatient  · Follow-up Podiatry consult     Acute kidney injury  · Possibly secondary to infection   · Patient received fluids  · Follow-up BMP     Right middle lobe pulmonary nodule  · Consider repeat CT scan in 6 months to assess for change     Type 2 diabetes with neuropathy  · Hold home oral regimen  · Correctional scale insulin while in the hospital  · Will order repeat A1c  Last A1c 7 9%      HLD  · Continue statin     Hypertension  · Continue amlodipine and lisinopril     Possible history of TIA  · Patient's neuro exam unremarkable    · Continue statin         VTE Pharmacologic Prophylaxis: Heparin   VTE Mechanical Prophylaxis: sequential compression device  Admission Status: INPATIENT Admission Orders:  Scheduled Meds:   Current Facility-Administered Medications:  acetaminophen 650 mg Oral Q6H PRN Arias Adeola, DO    amLODIPine 5 mg Oral Daily Leocadia Ivett Shafer, DO    aspirin 81 mg Oral Daily Leocadia Ivett Shafer, DO    heparin (porcine) 5,000 Units Subcutaneous Q8H [address] Leocadia Ivett Shafer, DO    insulin lispro 1-6 Units Subcutaneous TID GREG Shafer, DO    lisinopril 40 mg Oral Daily Leocadia Ivett Shafer, DO    metoprolol tartrate 25 mg Oral Q12H [address] Leocadia Ivett Shafer, DO    pravastatin 40 mg Oral Daily With Samuel Shafer, DO    vancomycin 15 mg/kg Intravenous Q12H Arias Adeola, DO Last Rate: Stopped (11/06/18 5187)     Regular diet  Up with assistance  Glucose finger stick QID  ECHO: pending  Lower limb venous duplex: pending  Consult Podiatry  TELM

## 2018-11-07 NOTE — ED NOTES
Paged SOD regarding heart rate, he responded and said he'd be down to see the pt        Chadwick Fernandez RN  11/06/18 1912

## 2018-11-07 NOTE — ED NOTES
Wound re-dressed as per requested order from admission team     Vince France, RN  11/07/18 [address] [place] [address], RN  11/07/18 0111

## 2018-11-07 NOTE — UTILIZATION REVIEW
Continued Stay Review    OBSERVATION 11/06/2018 @ 1608, CONVERTED TO INPATIENT ADMISSION 11/07/2018 @ , DUE TO FURTHER DIAGNOSTIC WORKUP, cellulitis with IV Abx, REQUIRING AT LEAST 2 MIDNIGHT STAY  Date: 11/07/2018 11/07/18 6839  Inpatient Admission Once     Transfer Service: General Medicine       Question Answer Comment   Admitting Physician Jerrod Acosta    Level of Care Med Surg    Estimated length of stay More than 2 Midnights    Certification I certify that inpatient services are medically necessary for this patient for a duration of greater than two midnights  See H&P and MD Progress Notes for additional information about the patient's course of treatment  Age/Sex: 79 y o  male     Assessment/Plan: 3994, 3883  1  New onset atrial fibrillation with RVR  ? Denies any previous cardiac history  ? Currently rate controlled after receiving IV Cardizem 20 mg x1 dose and PO Cardizem 30 mg x1 dose  ? [id number] score at least 3, anticoagulation indicated for stroke prophylaxis  ? Will discuss with CM regarding insurance coverage for DOAC but start on Eliquis 5 mg Q12h today  ? Continue Lopressor 25 mg Q12h with goal HR <110 bpm  ? Telemetry monitoring  ? Check TSH and echocardiogram     2  Right lower extremity cellulitis  ? 3rd incidence of RLE cellulitis within the past year according to patient  ? No previous cultures to base antibiotic therapy  ? Received IV vancomycin and Ancef in the ED  ? Will continue IV vancomycin and Ancef for now  ? Transition to PO Bactrim possibly later today for empiric treatment  ? Area of erythema demarcated with marker, monitor clinically for improvement  ? Follow-up blood cultures     3  Right foot ulcer  ? Follows with Dr Raffy Rosado of podiatry as outpatient  ? Follow-up podiatry consult and recommendations     4  Acute kidney injury  ? Serum creatinine elevated to 1 36 at time of admission, improved to 1 1 this morning  ?  Suspect pre-renal etiology  ? Continue IV fluid hydration  ? Monitor renal indices  ? Avoid unnecessary nephrotoxic agents  ? Restarted on lisinopril for BP control     5  Right middle lobe incidental pulmonary nodule  ? 7 mm RML nodule with findings of mild COPD  ? Based on Fleischner guidelines, follow-up with repeat non-contrast chest CT in 6-12 months for surveillance  ? Also noted to have bilateral hilar adenopathy of uncertain etiology and significance  ? Recommend surveillance with repeat CT as noted above     6  Type 2 diabetes mellitus with neuropathy  ? A1c 8 1%  ? Continue to hold outpatient oral medications  ? Correctional scale insulin  ? Monitor blood sugars  ? Goal -180     7  Hyperlipidemia  ? Check lipid panel  ? Continue statin therapy     8  Hypertension  ? BP controlled  ? Continue Norvasc, lisinopril, Lopressor  ? Goal is normotension  ? Avoid hypotension     Disposition: Continue inpatient care  IV antibiotics for RLE cellulitis and treatment for atrial fibrillation     Discharge Plan: TBD    Vital Signs: /83 (BP Location: Left arm)   Pulse (!) 114   Temp 99 3 °F (37 4 °C)   Resp 16   Wt 108 kg (238 lb)   SpO2 98%   BMI 28 97 kg/m²     Medications:   Scheduled Meds:   Current Facility-Administered Medications:  acetaminophen 650 mg Oral Q6H PRN Meri Shafer, DO    amLODIPine 5 mg Oral Daily Meri Shafer, DO    aspirin 81 mg Oral Daily Meri Shafer, DO    heparin (porcine) 5,000 Units Subcutaneous Q8H [address] Meri Shafer, DO    insulin lispro 1-6 Units Subcutaneous TID AC Graham Shafer, DO    lisinopril 40 mg Oral Daily Meri Shafer, DO    metoprolol tartrate 25 mg Oral Q12H [address] Meri Shafer, DO    pravastatin 40 mg Oral Daily With [employer] LIUDMILA Shafer, DO    vancomycin 15 mg/kg Intravenous Q12H Mermelisa Toure, DO Last Rate: Stopped (11/06/18 0945)     Abnormal Labs/Diagnostic Results:

## 2018-11-07 NOTE — H&P
INTERNAL MEDICINE HISTORY AND PHYSICAL  ED 01 SOD Team C     NAME: Lauren Gonzalez  AGE: 79 y o  SEX: male  : 1950   MRN: [id number]  ENCOUNTER: [id number]    DATE: 2018  TIME: 9:52 PM    Primary Care Physician: Sabi Lobato MD  Admitting Provider: Og Bustos MD    Chief complaint: A-fib    History of Present Illness     Lauren Gonzalez  is a 79 y o  male with history of recurrent cellulitis, type 2 diabetes, and diabetic neuropathy who initially presented to the ED with a complaint of recurrent right lower extremity cellulitis  History of recurrent cellulitis for which he has been treated with IV antibiotics several times in the past   He says he knows recurrence of his right lower extremity rash yesterday night, and then came to the ED today  The, he was given antibiotics and fluids  However, he was also noted to be in atrial fibrillation with rapid ventricular response on arrival   He has no known history of AFib  There was also an incidental finding of right lower lobe nodule on a PE study  He was given a bolus of cardizem in the ED however this failed to control his heart rate  He was given an additional 25 mg of metoprolol  For his cellulitis, he was given IV ancef  He has no complaints currently except for some subjective fever and tenderness in his rash  Review of Systems   Review of Systems   Constitutional: Positive for fever  Negative for activity change, chills and diaphoresis  HENT: Negative  Negative for hearing loss, sore throat and trouble swallowing  Eyes: Negative for visual disturbance  Respiratory: Negative for cough, shortness of breath and wheezing  Cardiovascular: Negative for chest pain, palpitations and leg swelling  Gastrointestinal: Negative for abdominal distention, abdominal pain, blood in stool, constipation, diarrhea, nausea and vomiting  Endocrine: Negative  Genitourinary: Negative for dysuria, frequency and hematuria     Musculoskeletal: Negative for arthralgias, joint swelling and myalgias  Skin: Positive for rash and wound  Allergic/Immunologic: Negative for immunocompromised state  Neurological: Negative for dizziness, facial asymmetry, speech difficulty, weakness, numbness and headaches  Hematological: Negative  Psychiatric/Behavioral: Negative for behavioral problems, confusion, dysphoric mood and self-injury  Past Medical History     Past Medical History:   Diagnosis Date    Atrial fibrillation ([place] [address] )     Diagnosed 11/2018    Benign prostate hyperplasia 04/2002    Colon polyp 2006    Diabetes mellitus ([place] [address] )     History of TIA (transient ischemic attack) 4/25/2017    Hyperlipidemia 5/15/2014    Hypertension     Right middle lobe pulmonary nodule 11/6/2018    Type 2 diabetes mellitus with diabetic neuropathy ([place] [address] ) 11/6/2018       Past Surgical History     Past Surgical History:   Procedure Laterality Date    CLUB FOOT RELEASE Bilateral     VASECTOMY  1992       Social History     History   Alcohol Use    Yes     Comment: rarely     History   Drug Use No     History   Smoking Status    Former Smoker    Types: Cigarettes   Smokeless Tobacco    Former User       Family History     Family History   Problem Relation Age of Onset    Diabetes Father         mellitus       Medications Prior to Admission     Prior to Admission medications    Medication Sig Start Date End Date Taking?  Authorizing Provider   Acetaminophen (TYLENOL) 325 MG CAPS Take 650 mg by mouth every 6 (six) hours   Yes Historical Provider, MD   amLODIPine (NORVASC) 5 mg tablet Take 1 tablet (5 mg total) by mouth daily 6/30/18  Yes Geena Rapp MD   aspirin 81 MG tablet Take 81 mg by mouth daily   Yes Historical Provider, MD   glimepiride (AMARYL) 4 mg tablet TAKE ONE TABLET BY MOUTH TWICE DAILY 7/6/18  Yes Geena Rapp MD   lisinopril (ZESTRIL) 40 mg tablet TAKE 1 TABLET BY MOUTH DAILY 9/30/18  Yes Geena Rapp MD   metFORMIN ([place]) 750 mg 24 hr tablet ONE BID 9/5/18  Yes Griffin Browne MD   rosuvastatin (CRESTOR) 5 mg tablet Take 1 tablet (5 mg total) by mouth daily 6/26/18  Yes Griffin Browne MD   acyclovir (ZOVIRAX) 5 % ointment every 3 (three) hours 2/4/14   Historical Provider, MD   amoxicillin-clavulanate (AUGMENTIN) 875-125 mg per tablet Take 1 tablet by mouth every 12 (twelve) hours 7/10/18   Historical Provider, MD   aspirin 81 MG tablet every 24 hours 1/13/11   Historical Provider, MD   Blood Glucose Monitoring Suppl (ACCU-CHEK JYOTI SMARTVIEW) w/Device KIT use to check Blood Sugar as needed 3/14/16   Historical Provider, MD   glucose blood (ACCU-CHEK SMARTVIEW) test strip 1 each by Other route as needed (once a day) for up to 90 days Use as instructed 9/5/18 12/4/18  Griffin Browne MD   sitaGLIPtin (JANUVIA) 100 mg tablet Take 1 tablet (100 mg total) by mouth daily for 90 days 7/17/18 10/15/18  Griffin Browne MD   vardenafil (LEVITRA) 20 MG tablet Take 1 tablet (20 mg total) by mouth daily as needed for erectile dysfunction 6/26/18   Griffin Browne MD   vardenafil (LEVITRA) 20 MG tablet Take 1 tablet (20 mg total) by mouth daily as needed for erectile dysfunction 6/26/18   Griffin Browne MD       Allergies     Allergies   Allergen Reactions    Simvastatin Myalgia       Objective     Vitals:    11/06/18 1645 11/06/18 1715 11/06/18 1900 11/06/18 2030   BP: 130/73 126/76 145/73 158/75   BP Location:   Left arm Left arm   Pulse: 98 (!) 112 (!) 112 (!) 130   Resp: 18 18 18 20   Temp:       TempSrc:       SpO2: 98% 100% 97% 98%   Weight:         Body mass index is 28 97 kg/m²      Intake/Output Summary (Last 24 hours) at 11/06/18 2152  Last data filed at 11/06/18 1639   Gross per 24 hour   Intake             1050 ml   Output                0 ml   Net             1050 ml     Invasive Devices     Peripheral Intravenous Line            Peripheral IV 11/06/18 Left Antecubital less than 1 day                Physical Exam  GENERAL: Appears well-developed and well-nourished  Appears in no acute distress   HEENT: Normocephalic and atraumatic  No scleral icterus  PERRLA  EOMI B/L  No oropharyngeal edema  MM moist    NECK: Neck supple with no lymphadenopathy  Trachea midline  No JVD  CARDIOVASCULAR: S1 and S2 are present  Irregularly irregular rhythm with tachycardia  No murmurs, rubs, or gallops  RESPIRATORY: CTA B/L, no rales, rhonci or wheezes  Normal respiratory expansion  ABDOMINAL: Bowel sounds present in all 4 quadrants, non-tender, soft, non-distended  No organomegaly, rebound, or guarding  EXTREMITIES: 2+ DP and PT pulses bilaterally; no cyanosis, clubbing, edema  ROM intact  GIRARD x4   MUSCULOSKELETAL: No joint tenderness, deformity or swelling, full range of motion without pain  NEUROLOGIC: Patient is alert and oriented to person, place, and time  No sensory or motor deficits  CN 2-12 intact  Plantars downgoing bilaterally  Speech fluent  SKIN: Skin is warm and dry  Right lower extremity erythema and area of anterior tibial region extending down to ankle  Small, minimum stage III ulcer on plantar aspect of right foot  PSYCHIATRIC: Normal mood and affect     Lab Results: I have personally reviewed pertinent reports      CBC:     Results from last 7 days  Lab Units 11/06/18  2108 11/06/18  1423   WBC Thousand/uL  --  13 17*   RBC Million/uL  --  4 72   HEMOGLOBIN g/dL  --  14 5   HEMATOCRIT %  --  45 0   MCV fL  --  95   MCH pg  --  30 7   MCHC g/dL  --  32 2   RDW %  --  13 7   MPV fL 10 9 10 7   PLATELETS Thousands/uL 192 214   NRBC AUTO /100 WBCs  --  0   NEUTROS PCT %  --  80*   LYMPHS PCT %  --  13*   MONOS PCT %  --  5   EOS PCT %  --  1   BASOS PCT %  --  0   NEUTROS ABS Thousands/µL  --  10 58*   LYMPHS ABS Thousands/µL  --  1 76   MONOS ABS Thousand/µL  --  0 65   EOS ABS Thousand/µL  --  0 08   , Chemistry Profile:     Results from last 7 days  Lab Units 11/06/18  1423   POTASSIUM mmol/L 3 9   CHLORIDE mmol/L 105 CO2 mmol/L 25   BUN mg/dL 19   CREATININE mg/dL 1 36*   CALCIUM mg/dL 9 2   AST U/L 14   ALT U/L 36   ALK PHOS U/L 79   EGFR ml/min/1 73sq m 54   , Coagulation Studies:     Results from last 7 days  Lab Units 11/06/18  1423   PROTIME seconds 14 0   INR  1 07   PTT seconds 29   , Cardiac Studies:     Results from last 7 days  Lab Units 11/06/18  1423   TROPONIN I ng/mL <0 02   , Additional Labs:     Results from last 7 days  Lab Units 11/06/18  1423   MAGNESIUM mg/dL 2 2   , Toxicology:   , Last A1C/Lipid Panel/Thyroid Panel:   Lab Results   Component Value Date    HGBA1C 7 9 (H) 05/22/2018       Imaging: I have personally reviewed pertinent films in PACS  Cta Ed Chest Pe Study    Result Date: 11/6/2018  Narrative: CTA - CHEST WITH IV CONTRAST - PULMONARY ANGIOGRAM INDICATION:   new onset rapid afib, RLE pain with concern for DVT  COMPARISON: None  TECHNIQUE: CTA examination of the chest was performed using angiographic technique according to a protocol specifically tailored to evaluate for pulmonary embolism  Axial, sagittal, and coronal 2D reformatted images were created from the source data and  submitted for interpretation  In addition, coronal 3D MIP postprocessing was performed on the acquisition scanner  Radiation dose length product (DLP) for this visit:  724 8 mGy-cm   This examination, like all CT scans performed in the [facility], was performed utilizing techniques to minimize radiation dose exposure, including the use of iterative reconstruction and automated exposure control  IV Contrast:  85 mL of iohexol (OMNIPAQUE)  FINDINGS: PULMONARY ARTERIAL TREE:  No pulmonary embolus is seen  LUNGS:  No infiltrates  A 7 mm right middle lobe nodule is identified on image 4/37  There is mild biapical paraseptal emphysema  There is no tracheal or endobronchial lesion  PLEURA:  Unremarkable  HEART/GREAT VESSELS:  Normal heart size  Coronary artery calcifications noted    Normal caliber thoracic aorta with mild atherosclerotic calcifications  MEDIASTINUM AND VAN:  There is mild bilateral hilar adenopathy  No mediastinal adenopathy  CHEST WALL AND LOWER NECK:   Unremarkable  VISUALIZED STRUCTURES IN THE UPPER ABDOMEN:  Unremarkable  OSSEOUS STRUCTURES:  No acute fracture or destructive osseous lesion  Impression: No acute pathology  No pulmonary embolism  Right middle lobe 7 mm nodule  Mild COPD  Based on current Fleischner Society 2017 Guidelines on incidental pulmonary nodule, followup non-contrast CT is recommended at 6-12 months from the initial examination and, if stable at that time, an additional  followup is recommended for 18-24 months from the initial examination  Bilateral hilar adenopathy, of uncertain etiology and significance  This can be reevaluated on follow-up exam as above  Workstation performed: [id number]     Vas Lower Limb Venous Duplex Study, Unilateral/limited    Result Date: 11/6/2018  Narrative:  [facility] REPORT CLINICAL: Indications: Patient presents with right lower extremity pain and erythema x 1 day  Operative History: No cardiovascular surgeries noted  Risk Factors The patient has history of HTN and Diabetes (Yes)  The patient current BMI is 28 97, Weight is 238 lb and height is 76 in  FINDINGS:  Segment  Right            Left              Impression       Impression       CFV      Normal (Patent)  Normal (Patent)     CONCLUSION: RIGHT LOWER LIMB No evidence of acute or chronic deep vein thrombosis   No evidence of superficial thrombophlebitis noted  Doppler evaluation shows a normal response to augmentation maneuvers  Popliteal, posterior tibial and anterior tibial arterial Doppler waveforms are triphasic  Tech Note: There is an echogenic structure located in the inguinal region  This structure measures approximately 2 8 x 1 0 x 1 9 cm and is consistent with enlarged lymph node and channels   LEFT LOWER LIMB LIMITED Evaluation shows no evidence of thrombus in the common femoral vein  Doppler evaluation shows a normal response to augmentation maneuvers  Technical findings were given to bedside RN  Microbiology: cultures obtained in emergency department include none    Urinalysis:       Invalid input(s): URIBILINOGEN     Urine Micro:        EKG, Pathology, and Other Studies: I have personally reviewed pertinent reports        Medications given in Emergency Department     Medication Administration - last 24 hours from 11/05/2018 2152 to 11/06/2018 2152       Date/Time Order Dose Route Action Action by     11/06/2018 1900 sodium chloride 0 9 % bolus 1,000 mL 0 mL Intravenous Stopped Kassie Castillo RN     11/06/2018 1433 sodium chloride 0 9 % bolus 1,000 mL 1,000 mL Intravenous [address] [facility] [facility] [address] Francisco RN     11/06/2018 1536 ceFAZolin (ANCEF) IVPB (premix) 2,000 mg 0 mg Intravenous Stopped Nannette Romberg, RN     11/06/2018 1506 ceFAZolin (ANCEF) IVPB (premix) 2,000 mg 2,000 mg Intravenous [address] [facility] [address] Francisco RN     11/06/2018 1433 diltiazem (CARDIZEM) injection 20 mg 20 mg Intravenous Given Nannette Romberg, RN     11/06/2018 1639 sodium chloride 0 9 % bolus 1,000 mL 0 mL Intravenous Stopped Nannette Romberg, RN     11/06/2018 1539 sodium chloride 0 9 % bolus 1,000 mL 1,000 mL Intravenous [place] [address] [place] [address] RAVEN Singh     11/06/2018 1558 diltiazem (CARDIZEM) tablet 30 mg 30 mg Oral Given Nannette Romberg, RN     11/06/2018 1524 iohexol (OMNIPAQUE) 350 MG/ML injection (MULTI-DOSE) 85 mL 85 mL Intravenous Given Bob Nuñez     11/06/2018 2036 metoprolol tartrate (LOPRESSOR) tablet 25 mg 25 mg Oral Given Kassiecara Castillo RN     11/06/2018 2108 lactated ringers infusion 100 mL/hr Intravenous [address] [facility] RAVEN Castillo     11/06/2018 2110 heparin (porcine) subcutaneous injection 5,000 Units 5,000 Units Subcutaneous Given [facility] RAVEN Castillo     11/06/2018 2142 vancomycin (VANCOCIN) 1,500 mg in sodium chloride 0 9 % 250 mL IVPB 1,500 mg Intravenous Eren Dominguez RN          Assessment and Plan     Principal Problem:    Atrial fibrillation with RVR ([facility] [address] )  Active Problems:    Cellulitis of right lower extremity    Hyperlipidemia    History of TIA (transient ischemic attack)    Type 2 diabetes mellitus with diabetic neuropathy (HCC)    Acute kidney injury ([facility] [address] )    Pressure injury of right foot, stage 3 (HCC)    Right middle lobe pulmonary nodule    Atrial fibrillation with RVR  · Patient received 2 doses of Cardizem in the ED  Heart rate improved with 25 mg of metoprolol  · Telemetry monitoring  · Continue metoprolol every 12 hours  · Patient with CHADS2 Vasc score of 3, will need anticoagulation  · Echocardiogram    Right lower extremity cellulitis  · Possible source could be patient's right foot ulcer  · Patient failed outpatient antibiotics  · Continue IV vancomycin  · Follow-up blood cultures    Right foot ulcer  · Patient says he follows up with Dr Nahed Bills of Podiatry as an outpatient  · Follow-up Podiatry consult    Acute kidney injury  · Possibly secondary to infection   · Patient received fluids  · Follow-up BMP    Right middle lobe pulmonary nodule  · Consider repeat CT scan in 6 months to assess for change    Type 2 diabetes with neuropathy  · Hold home oral regimen  · Correctional scale insulin while in the hospital  · Will order repeat A1c  Last A1c 7 9%  HLD  · Continue statin    Hypertension  · Continue amlodipine and lisinopril    Possible history of TIA  · Patient's neuro exam unremarkable  · Continue statin  Code Status: Level 1 - Full Code  VTE Pharmacologic Prophylaxis: Heparin   VTE Mechanical Prophylaxis: sequential compression device  Admission Status: INPATIENT     Admission Time  I spent 1 hour admitting the patient  This involved direct patient contact where I performed a full history and physical, reviewing previous records, and reviewing laboratory and other diagnostic studies      Aroldo Shafer, DO  Internal Medicine  PGY-3    Portions of the record may have been created with voice recognition software   Occasional wrong word or "sound a like" substitutions may have occurred due to the inherent limitations of voice recognition software   Read the chart carefully and recognize, using context, where substitutions have occurred

## 2018-11-07 NOTE — DISCHARGE INSTRUCTIONS
1  STOP taking rosuvastatin (Crestor)  Medication has been changed to atorvastatin (Lipitor)  Take atorvastatin 40 mg tablet, 1 tablet daily at bedtime  2  You have also been prescribed Eliquis (blood thinner)  Take this twice daily as directed  3  You have also been prescribed metoprolol tartrate (Lopressor)  Take this twice daily as directed  4  Take your other medications as directed  5  Schedule a follow-up appointment with your primary care doctor and the podiatrist     Discharge Instructions - Podiatry    Weight Bearing Status:   Weight bearing as tolerated in diabetic shoes with custom inserts                                          F/u appointment Instructions: Make an appointment with Dr Sarahi Mane w/in one week of discharge  Contact sooner if any increase in pain, or signs of infection occur    Wound Care: Follow already given wound care instructions by Dr Sarahi Mane      A-fib (Atrial Fibrillation)   WHAT YOU NEED TO KNOW:   A-fib may come and go, or it may be a long-term condition  A-fib can cause blood clots, stroke, or heart failure  These conditions may become life-threatening  It is important to treat and manage a-fib to help prevent a blood clot, stroke, or heart failure          DISCHARGE INSTRUCTIONS:   Call 911 for any of the following:   · You have any of the following signs of a heart attack:      ¨ Squeezing, pressure, or pain in your chest that lasts longer than 5 minutes or returns    ¨ Discomfort or pain in your back, neck, jaw, stomach, or arm     ¨ Trouble breathing    ¨ Nausea or vomiting    ¨ Lightheadedness or a sudden cold sweat, especially with chest pain or trouble breathing    · You have any of the following signs of a stroke:      ¨ Numbness or drooping on one side of your face     ¨ Weakness in an arm or leg    ¨ Confusion or difficulty speaking    ¨ Dizziness, a severe headache, or vision loss  Seek care immediately if:  You have any of the following signs of a blood clot:  · You feel lightheaded, are short of breath, and have chest pain  · You cough up blood  · You have swelling, redness, pain, or warmth in your arm or leg  Contact your cardiologist or healthcare provider if:   · Your heart rate is higher than your healthcare provider said it should be  · You have new or worsening swelling in your legs, feet, ankles, or abdomen  · You are short of breath, even at rest      · You have questions or concerns about your condition or care  Medicines: You may need any of the following:  · Heart medicines  help control your heart rate and rhythm  You may need more than one medicine to treat your symptoms  · Blood thinners    help prevent blood clots  Examples of blood thinners include heparin and warfarin  Clots can cause strokes, heart attacks, and death  The following are general safety guidelines to follow while you are taking a blood thinner:    ¨ Watch for bleeding and bruising while you take blood thinners  Watch for bleeding from your gums or nose  Watch for blood in your urine and bowel movements  Use a soft washcloth on your skin, and a soft toothbrush to brush your teeth  This can keep your skin and gums from bleeding  If you shave, use an electric shaver  Do not play contact sports  ¨ Tell your dentist and other healthcare providers that you take anticoagulants  Wear a bracelet or necklace that says you take this medicine  ¨ Do not start or stop any medicines unless your healthcare provider tells you to  Many medicines cannot be used with blood thinners  ¨ Tell your healthcare provider right away if you forget to take the medicine, or if you take too much  ¨ Warfarin  is a blood thinner that you may need to take  The following are things you should be aware of if you take warfarin  § Foods and medicines can affect the amount of warfarin in your blood  Do not make major changes to your diet while you take warfarin   Warfarin works best when you eat about the same amount of vitamin K every day  Vitamin K is found in green leafy vegetables and certain other foods  Ask for more information about what to eat when you are taking warfarin  § You will need to see your healthcare provider for follow-up visits when you are on warfarin  You will need regular blood tests  These tests are used to decide how much medicine you need  · Antiplatelets , such as aspirin, help prevent blood clots  Take your antiplatelet medicine exactly as directed  These medicines make it more likely for you to bleed or bruise  If you are told to take aspirin, do not take acetaminophen or ibuprofen instead  · Take your medicine as directed  Contact your healthcare provider if you think your medicine is not helping or if you have side effects  Tell him or her if you are allergic to any medicine  Keep a list of the medicines, vitamins, and herbs you take  Include the amounts, and when and why you take them  Bring the list or the pill bottles to follow-up visits  Carry your medicine list with you in case of an emergency  Follow up with your cardiologist as directed: You will need regular blood tests and monitoring  Write down your questions so you remember to ask them during your visits  Manage A-fib:   · Know your target heart rate  Learn how to take your pulse and monitor your heart rate  · Manage other health conditions  This includes high blood pressure, sleep apnea, thyroid disease, diabetes, and other heart conditions  Take medicine as directed and follow your treatment plan  · Limit or do not drink alcohol  Alcohol can make a-fib hard to manage  Ask your healthcare provider if it is safe for you to drink alcohol  A drink of alcohol is 12 ounces of beer, 5 ounces of wine, or 1½ ounces of liquor  · Do not smoke  Nicotine and other chemicals in cigarettes and cigars can cause heart and lung damage   Ask your healthcare provider for information if you currently smoke and need help to quit  E-cigarettes or smokeless tobacco still contain nicotine  Talk to your healthcare provider before you use these products  · Eat heart-healthy foods  Heart healthy foods will help keep your cholesterol low  These include fruits, vegetables, whole-grain breads, low-fat dairy products, beans, lean meats, and fish  Replace butter and margarine with heart-healthy oils such as olive oil and canola oil  · Maintain a healthy weight  Ask your healthcare provider how much you should weigh  Ask him to help you create a weight loss plan if you are overweight  · Exercise for 30 minutes  most days of the week  Ask your healthcare provider about the best exercise plan for you  © 2017 [address] [employer] Information is for End User's use only and may not be sold, redistributed or otherwise used for commercial purposes  All illustrations and images included in CareNotes® are the copyrighted property of [employer] , [employer]  or Gregory Ingram  The above information is an  only  It is not intended as medical advice for individual conditions or treatments  Talk to your doctor, nurse or pharmacist before following any medical regimen to see if it is safe and effective for you

## 2018-11-07 NOTE — PROGRESS NOTES
IMR PROGRESS NOTE     Unit/Bed#: ED 01   Encounter: [id number]  SOD Team C     PATIENT INFORMATION     Montez Hassan    79 y o  male   MRN: [id number]  Hospital Stay Days: 0    ASSESSMENT/PLAN     Principal Problem:    Atrial fibrillation with RVR ([facility] )  Active Problems:    Cellulitis of right lower extremity    Hyperlipidemia    History of TIA (transient ischemic attack)    Type 2 diabetes mellitus with diabetic neuropathy (HCC)    Acute kidney injury ([facility] )    Pressure injury of right foot, stage 3 ([facility] )    Right middle lobe pulmonary nodule    1  New onset atrial fibrillation with RVR  · Denies any previous cardiac history  · Currently rate controlled after receiving IV Cardizem 20 mg x1 dose and PO Cardizem 30 mg x1 dose  · [id number] score at least 3, anticoagulation indicated for stroke prophylaxis  · Will discuss with CM regarding insurance coverage for DOAC but start on Eliquis 5 mg Q12h today  · Continue Lopressor 25 mg Q12h with goal HR <110 bpm  · Telemetry monitoring  · Check TSH and echocardiogram    2  Right lower extremity cellulitis  · 3rd incidence of RLE cellulitis within the past year according to patient  · No previous cultures to base antibiotic therapy  · Received IV vancomycin and Ancef in the ED  · Will continue IV vancomycin and Ancef for now  · Transition to PO Bactrim possibly later today for empiric treatment  · Area of erythema demarcated with marker, monitor clinically for improvement  · Follow-up blood cultures    3  Right foot ulcer  · Follows with Dr Bebeto Bender of podiatry as outpatient  · Follow-up podiatry consult and recommendations    4  Acute kidney injury  · Serum creatinine elevated to 1 36 at time of admission, improved to 1 1 this morning  · Suspect pre-renal etiology  · Continue IV fluid hydration  · Monitor renal indices  · Avoid unnecessary nephrotoxic agents  · Restarted on lisinopril for BP control     5   Right middle lobe incidental pulmonary nodule  · 7 mm RML nodule with findings of mild COPD  · Based on Fleischner guidelines, follow-up with repeat non-contrast chest CT in 6-12 months for surveillance  · Also noted to have bilateral hilar adenopathy of uncertain etiology and significance  · Recommend surveillance with repeat CT as noted above    6  Type 2 diabetes mellitus with neuropathy  · A1c 8 1%  · Continue to hold outpatient oral medications  · Correctional scale insulin  · Monitor blood sugars  · Goal -180    7  Hyperlipidemia  · Check lipid panel  · Continue statin therapy    8  Hypertension  · BP controlled  · Continue Norvasc, lisinopril, Lopressor  · Goal is normotension  · Avoid hypotension    Disposition: Continue inpatient care  IV antibiotics for RLE cellulitis and treatment for atrial fibrillation  SUBJECTIVE     Patient seen and examined  No acute events overnight  Offers no acute complaints today  Denies any chest pain or palpations  OBJECTIVE     Vitals: Temp (24hrs), Av 2 °F (37 3 °C), Min:99 °F (37 2 °C), Max:99 3 °F (37 4 °C)   Temperature: 99 3 °F (37 4 °C)  Vitals:    18 2030 18 2231 18 0715 18 0733   BP: 158/75 134/68 131/83 131/83   BP Location: Left arm Right arm Left arm Left arm   Pulse: (!) 130 101 (!) 118 (!) 114   Resp: 20 18 18 16   Temp:   99 3 °F (37 4 °C)    TempSrc:       SpO2: 98% 97% 96% 98%   Weight:          Body mass index is 28 97 kg/m²  I/O last 24 hours: In:  3 [I V :1033 3; IV Piggyback:1050]  Out: 800 [Urine:800]    Physical Exam   Constitutional: He is oriented to person, place, and time  He appears well-developed and well-nourished  No distress  HENT:   Head: Normocephalic and atraumatic  Nose: Nose normal    Mouth/Throat: Oropharynx is clear and moist  No oropharyngeal exudate  Eyes: Conjunctivae and EOM are normal  No scleral icterus  Neck: Neck supple  No JVD present  No tracheal deviation present  Cardiovascular: Normal heart sounds and intact distal pulses    Exam reveals no gallop and no friction rub  No murmur heard  Irregularly irregular   Pulmonary/Chest: Effort normal and breath sounds normal  No respiratory distress  He has no wheezes  He has no rales  He exhibits no tenderness  Abdominal: Soft  Bowel sounds are normal  He exhibits no distension  There is no tenderness  There is no rebound and no guarding  Musculoskeletal: He exhibits no edema or deformity  Neurological: He is alert and oriented to person, place, and time  No cranial nerve deficit  Skin: He is not diaphoretic  There is erythema (Distal right lower extremity)  Psychiatric: He has a normal mood and affect  His behavior is normal  Judgment and thought content normal    Nursing note and vitals reviewed  Invasive Devices     Peripheral Intravenous Line            Peripheral IV 11/07/18 Right Forearm less than 1 day              LABORATORY DATA     Labs: I have personally reviewed pertinent reports        Results from last 7 days  Lab Units 11/07/18  0544 11/06/18 2108 11/06/18  1423   WBC Thousand/uL 10 58*  --  13 17*   HEMOGLOBIN g/dL 12 7  --  14 5   HEMATOCRIT % 39 9  --  45 0   PLATELETS Thousands/uL 188 192 214   NEUTROS PCT %  --   --  80*   MONOS PCT %  --   --  5      Results from last 7 days  Lab Units 11/07/18  0544 11/06/18  1423   POTASSIUM mmol/L 4 0 3 9   CHLORIDE mmol/L 103 105   CO2 mmol/L 23 25   BUN mg/dL 13 19   CREATININE mg/dL 1 10 1 36*   CALCIUM mg/dL 8 7 9 2   ALK PHOS U/L  --  79   ALT U/L  --  36   AST U/L  --  14       Results from last 7 days  Lab Units 11/07/18  0544 11/06/18  1423   MAGNESIUM mg/dL 1 9 2 2            Results from last 7 days  Lab Units 11/06/18  1423   INR  1 07   PTT seconds 29           Results from last 7 days  Lab Units 11/06/18  1423   TROPONIN I ng/mL <0 02       IMAGING & DIAGNOSTIC TESTING     Radiology Results: I have personally reviewed pertinent films in PACS  Cta Ed Chest Pe Study    Result Date: 11/6/2018  Impression: No acute pathology  No pulmonary embolism  Right middle lobe 7 mm nodule  Mild COPD  Based on current Fleischner Society 2017 Guidelines on incidental pulmonary nodule, followup non-contrast CT is recommended at 6-12 months from the initial examination and, if stable at that time, an additional  followup is recommended for 18-24 months from the initial examination  Bilateral hilar adenopathy, of uncertain etiology and significance  This can be reevaluated on follow-up exam as above  Workstation performed: [id number]     Other Diagnostic Testing: I have personally reviewed pertinent reports  ACTIVE MEDICATIONS     Current Facility-Administered Medications   Medication Dose Route Frequency    acetaminophen (TYLENOL) tablet 650 mg  650 mg Oral Q6H PRN    amLODIPine (NORVASC) tablet 5 mg  5 mg Oral Daily    aspirin chewable tablet 81 mg  81 mg Oral Daily    heparin (porcine) subcutaneous injection 5,000 Units  5,000 Units Subcutaneous Q8H [facility]    insulin lispro (HumaLOG) 100 units/mL subcutaneous injection 1-6 Units  1-6 Units Subcutaneous TID AC    lisinopril (ZESTRIL) tablet 40 mg  40 mg Oral Daily    metoprolol tartrate (LOPRESSOR) tablet 25 mg  25 mg Oral Q12H SUNG    pravastatin (PRAVACHOL) tablet 40 mg  40 mg Oral Daily With Dinner    vancomycin (VANCOCIN) 1,500 mg in sodium chloride 0 9 % 250 mL IVPB  15 mg/kg Intravenous Q12H     VTE Pharmacologic Prophylaxis: Heparin  VTE Mechanical Prophylaxis: sequential compression device    ==  LIUDMILA Cruz    [address]  Internal Medicine Resident PGY-2

## 2018-11-07 NOTE — CONSULTS
Consult - Podiatry   Shabbir Rico  79 y o  male MRN: [id number]  Unit/Bed#: ED 01 Encounter: [id number]    Assessment/Plan     Assessment:  1  Right submet 5 base ulceration, Parks 2 2/2 #2- POA  2  RLE cellulitis  3  DM 2; A1c 8 1%     Plan:  - Wound debrided at bedside to subcutaneous bleed  Wound appears stable, no signs of acute clinical infection  Dressed with maxorb, DSD bedside  - c/w IV antibiotics for cellulitis  - No DVT noted on venous doppler US  - Leukocytosis noted at 10 58  Afebrile currently, monitor closely for signs of sepsis  - XR right foot pending  - Blood cultures pending  - MRI from 9/18/18 reveals no OM of 5th metatarsal base  - We will continue to follow for local wound care  Thank you for consult  History of Present Illness     HPI:  Shabbir Rico  is a 79 y o  male who presents with right foot wound  Patient is know to Dr Araceli Hernadez of [facility] with weekly visits with wound debridement and silvercel dsg  He has had the wound since March 2018, initially starting with green drainage  He has diabetic shoes with custom molded inserts that he wears with ambulation  Endorses numbness from ankles down  States he was supposed to have surgery scheduled for right foot wound some time in December with Dr Araceli Hernadez  States he has recurrent cellulitis of RLE  Denies chills  Inpatient consult to Podiatry  Consult performed by: Ema Drew ordered by: Kassie NUR        Review of Systems   Constitutional: Negative  HENT: Negative  Eyes: Negative  Respiratory: Negative  Cardiovascular: Negative  Gastrointestinal: Negative  Musculoskeletal: Negative   Skin: Right foot wound and leg cellulitis  Neurological: Numbness b/l feet   Psych: negative         Historical Information   Past Medical History:   Diagnosis Date    Atrial fibrillation [facility])     Diagnosed 11/2018    Benign prostate hyperplasia 04/2002    Colon polyp 2006    Diabetes mellitus ([facility] )     History of TIA (transient ischemic attack) 4/25/2017    Hyperlipidemia 5/15/2014    Hypertension     Right middle lobe pulmonary nodule 11/6/2018    Type 2 diabetes mellitus with diabetic neuropathy ([facility] ) 11/6/2018     Past Surgical History:   Procedure Laterality Date    CLUB FOOT RELEASE Bilateral     VASECTOMY  1992     Social History   History   Alcohol Use    Yes     Comment: rarely     History   Drug Use No     History   Smoking Status    Former Smoker    Types: Cigarettes   Smokeless Tobacco    Former User     Family History:   Family History   Problem Relation Age of Onset    Diabetes Father         mellitus       Meds/Allergies     (Not in a hospital admission)  Allergies   Allergen Reactions    Simvastatin Myalgia       Objective   First Vitals:   Blood Pressure: (!) 143/112 (11/06/18 1133)  Pulse: 85 (11/06/18 1133)  Temperature: 99 °F (37 2 °C) (11/06/18 1133)  Temp Source: Oral (11/06/18 1133)  Respirations: 18 (11/06/18 1133)  Weight - Scale: 108 kg (238 lb) (11/06/18 1133)  SpO2: 99 % (11/06/18 1133)    Current Vitals:   Blood Pressure: 131/83 (11/07/18 0733)  Pulse: (!) 114 (11/07/18 0733)  Temperature: 99 3 °F (37 4 °C) (11/07/18 0715)  Temp Source: Oral (11/06/18 1133)  Respirations: 16 (11/07/18 0733)  Weight - Scale: 108 kg (238 lb) (11/06/18 1133)  SpO2: 98 % (11/07/18 0733)        /83 (BP Location: Left arm)   Pulse (!) 114   Temp 99 3 °F (37 4 °C)   Resp 16   Wt 108 kg (238 lb)   SpO2 98%   BMI 28 97 kg/m²      General Appearance:    Alert, cooperative, no distress   Head:    Normocephalic, without obvious abnormality, atraumatic   Eyes:    PERRL, conjunctiva/corneas clear, EOM's intact        Nose:   Moist mucous membranes   Neck:   Supple, symmetrical, trachea midline   Back:     Symmetric   Lungs:     Respirations unlabored   Heart:    Regular rate and rhythm, S1 and S2 normal, no murmur, rub   or gallop   Abdomen:     Soft, non-tender   Extremities:   1+ edema   Pulses:   Faintly palpable DP & PT pulses b/l  Skin:   Ulceration noted right submet 5 base measuring approximately 1 0x1 0x0 4cm, full thickness, fibrogranular base with hyperkeratotic periwound  No probe to bone  No signs of active infection: no purulence, no malodor, no ascending erythema, no crepitus, no fluctuance  Sanguinous drainage on dressing  RLE cellulitis at the leg and sparing the foot  Xerosis and thin  Shiny skin b/l  L plantar 5th callus   Neurologic:   Gross sensation is decreased  Protective sensation is absent                         Lab Results:   Admission on 11/06/2018   Component Date Value    WBC 11/06/2018 13 17*    RBC 11/06/2018 4 72     Hemoglobin 11/06/2018 14 5     Hematocrit 11/06/2018 45 0     MCV 11/06/2018 95     MCH 11/06/2018 30 7     MCHC 11/06/2018 32 2     RDW 11/06/2018 13 7     MPV 11/06/2018 10 7     Platelets 59/11/0690 214     nRBC 11/06/2018 0     Neutrophils Relative 11/06/2018 80*    Immat GRANS % 11/06/2018 1     Lymphocytes Relative 11/06/2018 13*    Monocytes Relative 11/06/2018 5     Eosinophils Relative 11/06/2018 1     Basophils Relative 11/06/2018 0     Neutrophils Absolute 11/06/2018 10 58*    Immature Grans Absolute 11/06/2018 0 06     Lymphocytes Absolute 11/06/2018 1 76     Monocytes Absolute 11/06/2018 0 65     Eosinophils Absolute 11/06/2018 0 08     Basophils Absolute 11/06/2018 0 04     Sodium 11/06/2018 136     Potassium 11/06/2018 3 9     Chloride 11/06/2018 105     CO2 11/06/2018 25     ANION GAP 11/06/2018 6     BUN 11/06/2018 19     Creatinine 11/06/2018 1 36*    Glucose 11/06/2018 163*    Calcium 11/06/2018 9 2     AST 11/06/2018 14     ALT 11/06/2018 36     Alkaline Phosphatase 11/06/2018 79     Total Protein 11/06/2018 7 9     Albumin 11/06/2018 3 8     Total Bilirubin 11/06/2018 0 40     eGFR 11/06/2018 54     Troponin I 11/06/2018 <0 02     NT-proBNP 11/06/2018 573*    Protime 11/06/2018 14 0  INR 11/06/2018 1 07     PTT 11/06/2018 29     Magnesium 11/06/2018 2 2     Ventricular Rate 11/06/2018 127     Atrial Rate 11/06/2018 258     QRSD Interval 11/06/2018 100     QT Interval 11/06/2018 288     QTC Interval 11/06/2018 418     P Axis 11/06/2018 0     QRS Axis 11/06/2018 73     T Wave Rutledge 11/06/2018 7     POC Glucose 11/06/2018 127     Platelets 95/22/4038 192     MPV 11/06/2018 10 9     Sodium 11/07/2018 134*    Potassium 11/07/2018 4 0     Chloride 11/07/2018 103     CO2 11/07/2018 23     ANION GAP 11/07/2018 8     BUN 11/07/2018 13     Creatinine 11/07/2018 1 10     Glucose 11/07/2018 153*    Glucose, Fasting 11/07/2018 153*    Calcium 11/07/2018 8 7     eGFR 11/07/2018 70     WBC 11/07/2018 10 58*    RBC 11/07/2018 4 17     Hemoglobin 11/07/2018 12 7     Hematocrit 11/07/2018 39 9     MCV 11/07/2018 96     MCH 11/07/2018 30 5     MCHC 11/07/2018 31 8     RDW 11/07/2018 14 1     Platelets 56/97/5435 188     MPV 11/07/2018 10 9     Magnesium 11/07/2018 1 9     Hemoglobin A1C 11/07/2018 8 1*    EAG 11/07/2018 186     POC Glucose 11/07/2018 155*                   Invalid input(s): LABAEARO            Imaging: I have personally reviewed pertinent films in PACS  EKG, Pathology, and Other Studies: I have personally reviewed pertinent reports        Code Status: Level 1 - Full Code  Advance Directive and Living Will:      Power of :    POLST:

## 2018-11-08 LAB
ANION GAP SERPL CALCULATED.3IONS-SCNC: 5 MMOL/L (ref 4–13)
ATRIAL RATE: 227 BPM
BASOPHILS # BLD AUTO: 0.05 THOUSANDS/ΜL (ref 0–0.1)
BASOPHILS NFR BLD AUTO: 1 % (ref 0–1)
BUN SERPL-MCNC: 16 MG/DL (ref 5–25)
CALCIUM SERPL-MCNC: 8.8 MG/DL (ref 8.3–10.1)
CHLORIDE SERPL-SCNC: 105 MMOL/L (ref 100–108)
CHOLEST SERPL-MCNC: 140 MG/DL (ref 50–200)
CO2 SERPL-SCNC: 26 MMOL/L (ref 21–32)
CREAT SERPL-MCNC: 1.1 MG/DL (ref 0.6–1.3)
EOSINOPHIL # BLD AUTO: 0.23 THOUSAND/ΜL (ref 0–0.61)
EOSINOPHIL NFR BLD AUTO: 4 % (ref 0–6)
ERYTHROCYTE [DISTWIDTH] IN BLOOD BY AUTOMATED COUNT: 13.8 % (ref 11.6–15.1)
GFR SERPL CREATININE-BSD FRML MDRD: 70 ML/MIN/1.73SQ M
GLUCOSE SERPL-MCNC: 152 MG/DL (ref 65–140)
GLUCOSE SERPL-MCNC: 169 MG/DL (ref 65–140)
GLUCOSE SERPL-MCNC: 174 MG/DL (ref 65–140)
GLUCOSE SERPL-MCNC: 205 MG/DL (ref 65–140)
GLUCOSE SERPL-MCNC: 221 MG/DL (ref 65–140)
HCT VFR BLD AUTO: 41.2 % (ref 36.5–49.3)
HDLC SERPL-MCNC: 38 MG/DL (ref 40–60)
HGB BLD-MCNC: 13.1 G/DL (ref 12–17)
IMM GRANULOCYTES # BLD AUTO: 0.02 THOUSAND/UL (ref 0–0.2)
IMM GRANULOCYTES NFR BLD AUTO: 0 % (ref 0–2)
LDLC SERPL CALC-MCNC: 72 MG/DL (ref 0–100)
LYMPHOCYTES # BLD AUTO: 1.45 THOUSANDS/ΜL (ref 0.6–4.47)
LYMPHOCYTES NFR BLD AUTO: 22 % (ref 14–44)
MCH RBC QN AUTO: 30.5 PG (ref 26.8–34.3)
MCHC RBC AUTO-ENTMCNC: 31.8 G/DL (ref 31.4–37.4)
MCV RBC AUTO: 96 FL (ref 82–98)
MONOCYTES # BLD AUTO: 0.93 THOUSAND/ΜL (ref 0.17–1.22)
MONOCYTES NFR BLD AUTO: 14 % (ref 4–12)
NEUTROPHILS # BLD AUTO: 3.91 THOUSANDS/ΜL (ref 1.85–7.62)
NEUTS SEG NFR BLD AUTO: 59 % (ref 43–75)
NRBC BLD AUTO-RTO: 0 /100 WBCS
PLATELET # BLD AUTO: 180 THOUSANDS/UL (ref 149–390)
PMV BLD AUTO: 11.1 FL (ref 8.9–12.7)
POTASSIUM SERPL-SCNC: 4.4 MMOL/L (ref 3.5–5.3)
QRS AXIS: 79 DEGREES
QRSD INTERVAL: 96 MS
QT INTERVAL: 336 MS
QTC INTERVAL: 394 MS
RBC # BLD AUTO: 4.3 MILLION/UL (ref 3.88–5.62)
SODIUM SERPL-SCNC: 136 MMOL/L (ref 136–145)
T WAVE AXIS: 46 DEGREES
TRIGL SERPL-MCNC: 148 MG/DL
VENTRICULAR RATE: 83 BPM
WBC # BLD AUTO: 6.59 THOUSAND/UL (ref 4.31–10.16)

## 2018-11-08 PROCEDURE — 0JBQ0ZZ EXCISION OF RIGHT FOOT SUBCUTANEOUS TISSUE AND FASCIA, OPEN APPROACH: ICD-10-PCS | Performed by: PODIATRIST

## 2018-11-08 PROCEDURE — 82948 REAGENT STRIP/BLOOD GLUCOSE: CPT

## 2018-11-08 PROCEDURE — 80048 BASIC METABOLIC PNL TOTAL CA: CPT | Performed by: STUDENT IN AN ORGANIZED HEALTH CARE EDUCATION/TRAINING PROGRAM

## 2018-11-08 PROCEDURE — 80061 LIPID PANEL: CPT | Performed by: STUDENT IN AN ORGANIZED HEALTH CARE EDUCATION/TRAINING PROGRAM

## 2018-11-08 PROCEDURE — 99233 SBSQ HOSP IP/OBS HIGH 50: CPT | Performed by: INTERNAL MEDICINE

## 2018-11-08 PROCEDURE — 85025 COMPLETE CBC W/AUTO DIFF WBC: CPT | Performed by: STUDENT IN AN ORGANIZED HEALTH CARE EDUCATION/TRAINING PROGRAM

## 2018-11-08 PROCEDURE — 93010 ELECTROCARDIOGRAM REPORT: CPT | Performed by: INTERNAL MEDICINE

## 2018-11-08 RX ORDER — ATORVASTATIN CALCIUM 40 MG/1
40 TABLET, FILM COATED ORAL
Status: DISCONTINUED | OUTPATIENT
Start: 2018-11-08 | End: 2018-11-09 | Stop reason: HOSPADM

## 2018-11-08 RX ORDER — METOPROLOL TARTRATE 50 MG/1
50 TABLET, FILM COATED ORAL EVERY 12 HOURS SCHEDULED
Status: DISCONTINUED | OUTPATIENT
Start: 2018-11-08 | End: 2018-11-09 | Stop reason: HOSPADM

## 2018-11-08 RX ADMIN — INSULIN LISPRO 1 UNITS: 100 INJECTION, SOLUTION INTRAVENOUS; SUBCUTANEOUS at 18:16

## 2018-11-08 RX ADMIN — Medication 81 MG: at 08:42

## 2018-11-08 RX ADMIN — VANCOMYCIN HYDROCHLORIDE 1500 MG: 10 INJECTION, POWDER, LYOPHILIZED, FOR SOLUTION INTRAVENOUS at 10:14

## 2018-11-08 RX ADMIN — INSULIN LISPRO 2 UNITS: 100 INJECTION, SOLUTION INTRAVENOUS; SUBCUTANEOUS at 13:17

## 2018-11-08 RX ADMIN — AMLODIPINE BESYLATE 5 MG: 5 TABLET ORAL at 08:42

## 2018-11-08 RX ADMIN — METOPROLOL TARTRATE 50 MG: 50 TABLET, FILM COATED ORAL at 22:26

## 2018-11-08 RX ADMIN — APIXABAN 5 MG: 5 TABLET, FILM COATED ORAL at 18:16

## 2018-11-08 RX ADMIN — ATORVASTATIN CALCIUM 40 MG: 40 TABLET, FILM COATED ORAL at 18:16

## 2018-11-08 RX ADMIN — APIXABAN 5 MG: 5 TABLET, FILM COATED ORAL at 08:42

## 2018-11-08 RX ADMIN — INSULIN LISPRO 1 UNITS: 100 INJECTION, SOLUTION INTRAVENOUS; SUBCUTANEOUS at 08:42

## 2018-11-08 RX ADMIN — INSULIN LISPRO 1 UNITS: 100 INJECTION, SOLUTION INTRAVENOUS; SUBCUTANEOUS at 23:52

## 2018-11-08 RX ADMIN — LISINOPRIL 40 MG: 20 TABLET ORAL at 08:42

## 2018-11-08 RX ADMIN — METOPROLOL TARTRATE 25 MG: 25 TABLET, FILM COATED ORAL at 08:42

## 2018-11-08 RX ADMIN — VANCOMYCIN HYDROCHLORIDE 1500 MG: 10 INJECTION, POWDER, LYOPHILIZED, FOR SOLUTION INTRAVENOUS at 22:26

## 2018-11-08 NOTE — SOCIAL WORK
Cm met w pt & explained Cm role  Pt lives with wife in 2 sty home  Bedroom on 2nd flr  Full bathrooms on both levels  Was Julián Bazan, works & drives  No dme  No h/o vna or rhb  Denies any MH, D&A tx  Uses [facility] [address]  PCP Dr Malick Russell  No POA  Emergency contact, wife Rocío Babin [phone]  Has car here  Informed will take preferences into account if any services are needed  Cm to follow  Patient/caregiver received discharge checklist  Content reviewed  Patient/caregiver encouraged to participate in discharge plan of care prior to discharge home  CM reviewed d/c planning process including the following: identifying help at home, patient preference for d/c planning needs, Discharge Lounge, [facility] Meds to Bed program, availability of treatment team to discuss questions or concerns patient and/or family may have regarding understanding medications and recognizing signs and symptoms once discharged  CM also encouraged patient to follow up with all recommended appointments after discharge  Patient advised of importance for patient and family to participate in managing patients medical well being

## 2018-11-08 NOTE — RESTORATIVE TECHNICIAN NOTE
Restorative Specialist Mobility Note       Activity: Other (Comment), Dangle (Patient states that he moves as a self and will get up to the chair for breakfast)

## 2018-11-08 NOTE — PROGRESS NOTES
IMR PROGRESS NOTE     Unit/Bed#: [facility] 929-01   Encounter: [id number]  SOD Team C     PATIENT INFORMATION     Gema Magallon    79 y o  male   MRN: [id number]  Hospital Stay Days: 1    ASSESSMENT/PLAN     Principal Problem:    Atrial fibrillation with RVR ([facility] [address] )  Active Problems:    Cellulitis of right lower extremity    Hyperlipidemia    History of TIA (transient ischemic attack)    Type 2 diabetes mellitus with diabetic neuropathy (HCC)    Acute kidney injury ([facility] [address] )    Pressure injury of right foot, stage 3 ([facility] [address] )    Right middle lobe pulmonary nodule    1  New onset atrial fibrillation with RVR  · Denies any previous cardiac history  · Currently rate controlled but does have episodes of RVR with exertion  · [id number] score at least 3, anticoagulation indicated for stroke prophylaxis  · Continue Eliquis for now but will discuss with CM regarding pricing for other DOACs, if too expensive, may need to anticoagulate with coumadin  · Will increase Lopressor to 50 mg Q12h with goal HR <110 bpm  · Telemetry monitoring  · TSH within normal limits  · Follow-up echocardiogram    2  Right lower extremity cellulitis  · 3rd incidence of RLE cellulitis within the past year according to patient  · No previous cultures to base antibiotic therapy  · Continue IV Vancomycin for now with plan to transition to PO antibiotics based on clinical improvement  · Area of erythema demarcated with marker, monitor clinically for improvement  · Follow-up blood cultures with show NGTD    3  Right foot ulcer  · Follows with Dr Frida Kearns of podiatry as outpatient  · Podiatry recommending treatment of cellulitis and local wound care for now; no plan for surgical intervention at this time  · After resolution of cellulitis, patient to follow-up with Dr Frida Kearns (outpatient podiatrist) who will perform his reconstruction    4   Acute kidney injury  · Serum creatinine elevated to 1 36 at time of admission, improved to 1 1 this morning  · Suspect pre-renal etiology  · Monitor renal indices  · Avoid unnecessary nephrotoxic agents     5  Right middle lobe incidental pulmonary nodule  · 7 mm RML nodule with findings of mild COPD  · Based on Fleischner guidelines, follow-up with repeat non-contrast chest CT in 6-12 months for surveillance  · Also noted to have bilateral hilar adenopathy of uncertain etiology and significance  · Recommend surveillance with repeat CT as noted above    6  Type 2 diabetes mellitus with neuropathy  · A1c 8 1%  · Slightly elevated BS this afternoon  · Continue to hold outpatient oral medications  · Correctional scale insulin  · Monitor blood sugars  · Goal -180    7  Hyperlipidemia  · Lipid panel shows total cholesterol 140, triglycerides 148, HDL 38 and LDL 72  · ASCVD risk 27 6%, indication for high-intensity statin therapy  · Will change to atorvastatin 40 mg and monitor for tolerance given history of myalgias on simvastatin    8  Hypertension  · BP controlled  · Continue Norvasc, lisinopril, Lopressor  · Goal is normotension  · Avoid hypotension    Disposition: Continue inpatient care  IV antibiotics for RLE cellulitis and treatment for atrial fibrillation  SUBJECTIVE     Patient seen and examined  No acute events overnight  Offers no acute complaints today  Reports cellulitis has improved  Denies any chest pain or palpations  OBJECTIVE     Vitals: Temp (24hrs), Av 8 °F (37 1 °C), Min:98 5 °F (36 9 °C), Max:99 3 °F (37 4 °C)   Temperature: 98 7 °F (37 1 °C)  Vitals:    18 1500 18 1900 18 2358 18 0331   BP: 118/80 125/72 129/55 119/80   BP Location: Right arm Right arm Right arm Right arm   Pulse: 94 102 87 83   Resp:    Temp: 98 5 °F (36 9 °C) 98 6 °F (37 °C) 98 7 °F (37 1 °C) 98 7 °F (37 1 °C)   TempSrc: Oral Oral Oral Oral   SpO2: 97% 98% 98% 99%   Weight:       Height:          Body mass index is 28 1 kg/m²  I/O last 24 hours: In: 1798 4 [P O :240; I V :1033 3;  IV Piggyback:525 1]  Out: 800 [Urine:800]    Physical Exam   Constitutional: He is oriented to person, place, and time  He appears well-developed and well-nourished  No distress  HENT:   Head: Normocephalic and atraumatic  Nose: Nose normal    Mouth/Throat: Oropharynx is clear and moist  No oropharyngeal exudate  Eyes: Conjunctivae and EOM are normal  No scleral icterus  Neck: Neck supple  No JVD present  No tracheal deviation present  Cardiovascular: Normal rate, normal heart sounds and intact distal pulses  Exam reveals no gallop and no friction rub  No murmur heard  Irregularly irregular   Pulmonary/Chest: Effort normal and breath sounds normal  No respiratory distress  He has no wheezes  He has no rales  He exhibits no tenderness  Abdominal: Soft  Bowel sounds are normal  He exhibits no distension  There is no tenderness  There is no rebound and no guarding  Musculoskeletal: He exhibits no edema or deformity  Neurological: He is alert and oriented to person, place, and time  No cranial nerve deficit  Skin: He is not diaphoretic  There is erythema (Distal right lower extremity)  Psychiatric: He has a normal mood and affect  His behavior is normal  Judgment and thought content normal    Nursing note and vitals reviewed  Invasive Devices     Peripheral Intravenous Line            Peripheral IV 11/07/18 Right Forearm 1 day              LABORATORY DATA     Labs: I have personally reviewed pertinent reports        Results from last 7 days  Lab Units 11/08/18  0505 11/07/18  0544 11/06/18  2108 11/06/18  1423   WBC Thousand/uL 6 59 10 58*  --  13 17*   HEMOGLOBIN g/dL 13 1 12 7  --  14 5   HEMATOCRIT % 41 2 39 9  --  45 0   PLATELETS Thousands/uL 180 188 192 214   NEUTROS PCT % 59  --   --  80*   MONOS PCT % 14*  --   --  5        Results from last 7 days  Lab Units 11/08/18  0505 11/07/18  1714 11/07/18  0544 11/06/18  1423   POTASSIUM mmol/L 4 4 3 9 4 0 3 9   CHLORIDE mmol/L 105 105 103 105 CO2 mmol/L 26 26 23 25   BUN mg/dL 16 15 13 19   CREATININE mg/dL 1 10 1 08 1 10 1 36*   CALCIUM mg/dL 8 8 8 7 8 7 9 2   ALK PHOS U/L  --   --   --  79   ALT U/L  --   --   --  36   AST U/L  --   --   --  14       Results from last 7 days  Lab Units 11/07/18  1714 11/07/18  0544 11/06/18  1423   MAGNESIUM mg/dL 2 3 1 9 2 2       Results from last 7 days  Lab Units 11/07/18  1714   PHOSPHORUS mg/dL 2 2*        Results from last 7 days  Lab Units 11/06/18  1423   INR  1 07   PTT seconds 29           Results from last 7 days  Lab Units 11/06/18  1423   TROPONIN I ng/mL <0 02       IMAGING & DIAGNOSTIC TESTING     Radiology Results: I have personally reviewed pertinent films in PACS  Cta Ed Chest Pe Study    Result Date: 11/6/2018  Impression: No acute pathology  No pulmonary embolism  Right middle lobe 7 mm nodule  Mild COPD  Based on current Fleischner Society 2017 Guidelines on incidental pulmonary nodule, followup non-contrast CT is recommended at 6-12 months from the initial examination and, if stable at that time, an additional  followup is recommended for 18-24 months from the initial examination  Bilateral hilar adenopathy, of uncertain etiology and significance  This can be reevaluated on follow-up exam as above  Workstation performed: [id number]     Other Diagnostic Testing: I have personally reviewed pertinent reports        ACTIVE MEDICATIONS     Current Facility-Administered Medications   Medication Dose Route Frequency    acetaminophen (TYLENOL) tablet 650 mg  650 mg Oral Q6H PRN    amLODIPine (NORVASC) tablet 5 mg  5 mg Oral Daily    apixaban (ELIQUIS) tablet 5 mg  5 mg Oral BID    aspirin chewable tablet 81 mg  81 mg Oral Daily    insulin lispro (HumaLOG) 100 units/mL subcutaneous injection 1-6 Units  1-6 Units Subcutaneous TID AC    lisinopril (ZESTRIL) tablet 40 mg  40 mg Oral Daily    metoprolol tartrate (LOPRESSOR) tablet 25 mg  25 mg Oral Q12H SUNG    pravastatin (PRAVACHOL) tablet 40 mg  40 mg Oral Daily With Dinner    vancomycin (VANCOCIN) 1,500 mg in sodium chloride 0 9 % 250 mL IVPB  15 mg/kg Intravenous Q12H     VTE Pharmacologic Prophylaxis: Heparin  VTE Mechanical Prophylaxis: sequential compression device    ==  LIUDMILA Abdul    [facility]  Internal Medicine Resident PGY-2

## 2018-11-08 NOTE — PROGRESS NOTES
Progress Note - Podiatry  Arina Laird  79 y o  male MRN: [id number]  [facility] Encounter: [id number]      Assessment:  1  Right submet 5 base ulceration, Parks 2 2/2 #2- POA  2  RLE cellulitis  3  DM 2; A1c 8 1%     Plan:  - Wound evaluated at bedside  Appears stable without clinical signs of infection  dressed with maxorb, DSD    - c/w IV antibiotics for cellulitis  Appears to be improving as with less central redness  - plan to see tomorrow to monitor RLE cellulitis  - No DVT noted on venous doppler US  - white count has normalized  VSS  - XR right foot show no evidence of OM  - Blood cultures NGTD  - MRI from 9/18/18 reveals no OM of 5th metatarsal base  - will continue local wound care, no surgical intervention planned  - pt will follow up with Dr Earl Both as outpatient as proceed with psb 5th met head resection on out patient basis      Subjective/Objective   Chief Complaint:   Chief Complaint   Patient presents with    Cellulitis     cellulitis of lower right leg since yesterday am  hx of cellulitis in the same area twice in july and august  has ulcer on right foot that has not resolved since july  Subjective: 79 y o  male was seen and evaluated at bedside  NAD  No foot pain  No n/v/f/c/sob    Blood pressure 132/87, pulse 97, temperature 98 5 °F (36 9 °C), temperature source Oral, resp  rate 18, height 6' 5" (1 956 m), weight 107 kg (236 lb 15 9 oz), SpO2 99 %  ,Body mass index is 28 1 kg/m²  Invasive Devices     Peripheral Intravenous Line            Peripheral IV 11/07/18 Right Forearm 1 day                Physical Exam:   General: Alert, cooperative and no distress  Lungs: Non labored breathing  Heart: Positive S1, S2  Abdomen: Soft, non-tender  Extremity: Gross MKS/NVS at baseline    Ulceration noted right submet 5 base measuring approximately 0 7x0 7x0 2cm, fibrogranular base with hyperkeratotic periwound borders  No probe to bone   No signs of active infection: continues to be stable          Lab, Imaging and other studies:   CBC:   Lab Results   Component Value Date    WBC 6 59 11/08/2018    HGB 13 1 11/08/2018    HCT 41 2 11/08/2018    MCV 96 11/08/2018     11/08/2018    MCH 30 5 11/08/2018    MCHC 31 8 11/08/2018    RDW 13 8 11/08/2018    MPV 11 1 11/08/2018    NRBC 0 11/08/2018   , CMP:   Lab Results   Component Value Date    SODIUM 136 11/08/2018    K 4 4 11/08/2018     11/08/2018    CO2 26 11/08/2018    BUN 16 11/08/2018    CREATININE 1 10 11/08/2018    CALCIUM 8 8 11/08/2018    EGFR 70 11/08/2018       Imaging: I have personally reviewed pertinent films in PACS  EKG, Pathology, and Other Studies: I have personally reviewed pertinent reports

## 2018-11-08 NOTE — SOCIAL WORK
Cm asked to inquire about cost of eliquis, pradaxa & xarelto  S/w pt whom is agreeable to send to [facility]  Cost of Eliquis is $157 28  Pradaxa is $150 35  Xarelto is $157 29  All are eligible for free 30 day trial only  Informed pt whom is agreeable to cost if needed  Pt inquiring if there is a more affordable medication    Informed SOD C

## 2018-11-09 ENCOUNTER — TELEPHONE (OUTPATIENT)
Dept: FAMILY MEDICINE CLINIC | Facility: CLINIC | Age: 68
End: 2018-11-09

## 2018-11-09 VITALS
DIASTOLIC BLOOD PRESSURE: 68 MMHG | RESPIRATION RATE: 18 BRPM | HEART RATE: 85 BPM | OXYGEN SATURATION: 98 % | WEIGHT: 236.99 LBS | HEIGHT: 77 IN | TEMPERATURE: 98 F | SYSTOLIC BLOOD PRESSURE: 105 MMHG | BODY MASS INDEX: 27.98 KG/M2

## 2018-11-09 PROBLEM — N17.9 ACUTE KIDNEY INJURY (HCC): Status: RESOLVED | Noted: 2018-11-06 | Resolved: 2018-11-09

## 2018-11-09 LAB
ANION GAP SERPL CALCULATED.3IONS-SCNC: 5 MMOL/L (ref 4–13)
BASOPHILS # BLD AUTO: 0.03 THOUSANDS/ΜL (ref 0–0.1)
BASOPHILS NFR BLD AUTO: 1 % (ref 0–1)
BUN SERPL-MCNC: 17 MG/DL (ref 5–25)
CALCIUM SERPL-MCNC: 8.5 MG/DL (ref 8.3–10.1)
CHLORIDE SERPL-SCNC: 107 MMOL/L (ref 100–108)
CO2 SERPL-SCNC: 25 MMOL/L (ref 21–32)
CREAT SERPL-MCNC: 0.97 MG/DL (ref 0.6–1.3)
EOSINOPHIL # BLD AUTO: 0.23 THOUSAND/ΜL (ref 0–0.61)
EOSINOPHIL NFR BLD AUTO: 4 % (ref 0–6)
ERYTHROCYTE [DISTWIDTH] IN BLOOD BY AUTOMATED COUNT: 13.4 % (ref 11.6–15.1)
GFR SERPL CREATININE-BSD FRML MDRD: 81 ML/MIN/1.73SQ M
GLUCOSE SERPL-MCNC: 181 MG/DL (ref 65–140)
GLUCOSE SERPL-MCNC: 190 MG/DL (ref 65–140)
GLUCOSE SERPL-MCNC: 200 MG/DL (ref 65–140)
HCT VFR BLD AUTO: 40.7 % (ref 36.5–49.3)
HGB BLD-MCNC: 13 G/DL (ref 12–17)
IMM GRANULOCYTES # BLD AUTO: 0.02 THOUSAND/UL (ref 0–0.2)
IMM GRANULOCYTES NFR BLD AUTO: 0 % (ref 0–2)
LYMPHOCYTES # BLD AUTO: 1.9 THOUSANDS/ΜL (ref 0.6–4.47)
LYMPHOCYTES NFR BLD AUTO: 36 % (ref 14–44)
MCH RBC QN AUTO: 30.2 PG (ref 26.8–34.3)
MCHC RBC AUTO-ENTMCNC: 31.9 G/DL (ref 31.4–37.4)
MCV RBC AUTO: 94 FL (ref 82–98)
MONOCYTES # BLD AUTO: 0.49 THOUSAND/ΜL (ref 0.17–1.22)
MONOCYTES NFR BLD AUTO: 9 % (ref 4–12)
NEUTROPHILS # BLD AUTO: 2.68 THOUSANDS/ΜL (ref 1.85–7.62)
NEUTS SEG NFR BLD AUTO: 50 % (ref 43–75)
NRBC BLD AUTO-RTO: 0 /100 WBCS
PLATELET # BLD AUTO: 208 THOUSANDS/UL (ref 149–390)
PMV BLD AUTO: 11.1 FL (ref 8.9–12.7)
POTASSIUM SERPL-SCNC: 3.9 MMOL/L (ref 3.5–5.3)
RBC # BLD AUTO: 4.31 MILLION/UL (ref 3.88–5.62)
SODIUM SERPL-SCNC: 137 MMOL/L (ref 136–145)
WBC # BLD AUTO: 5.35 THOUSAND/UL (ref 4.31–10.16)

## 2018-11-09 PROCEDURE — 99239 HOSP IP/OBS DSCHRG MGMT >30: CPT | Performed by: INTERNAL MEDICINE

## 2018-11-09 PROCEDURE — 82948 REAGENT STRIP/BLOOD GLUCOSE: CPT

## 2018-11-09 PROCEDURE — 85025 COMPLETE CBC W/AUTO DIFF WBC: CPT | Performed by: STUDENT IN AN ORGANIZED HEALTH CARE EDUCATION/TRAINING PROGRAM

## 2018-11-09 PROCEDURE — 80048 BASIC METABOLIC PNL TOTAL CA: CPT | Performed by: STUDENT IN AN ORGANIZED HEALTH CARE EDUCATION/TRAINING PROGRAM

## 2018-11-09 PROCEDURE — 93306 TTE W/DOPPLER COMPLETE: CPT | Performed by: INTERNAL MEDICINE

## 2018-11-09 RX ORDER — SULFAMETHOXAZOLE AND TRIMETHOPRIM 800; 160 MG/1; MG/1
1 TABLET ORAL EVERY 12 HOURS SCHEDULED
Qty: 12 TABLET | Refills: 0 | Status: SHIPPED | OUTPATIENT
Start: 2018-11-09 | End: 2018-11-15

## 2018-11-09 RX ORDER — ATORVASTATIN CALCIUM 40 MG/1
40 TABLET, FILM COATED ORAL
Qty: 30 TABLET | Refills: 0 | Status: SHIPPED | OUTPATIENT
Start: 2018-11-09 | End: 2018-12-10 | Stop reason: SDUPTHER

## 2018-11-09 RX ORDER — METOPROLOL TARTRATE 50 MG/1
50 TABLET, FILM COATED ORAL EVERY 12 HOURS SCHEDULED
Qty: 60 TABLET | Refills: 0 | Status: SHIPPED | OUTPATIENT
Start: 2018-11-09 | End: 2018-12-10 | Stop reason: SDUPTHER

## 2018-11-09 RX ADMIN — Medication 81 MG: at 08:03

## 2018-11-09 RX ADMIN — METOPROLOL TARTRATE 50 MG: 50 TABLET, FILM COATED ORAL at 08:03

## 2018-11-09 RX ADMIN — VANCOMYCIN HYDROCHLORIDE 1500 MG: 10 INJECTION, POWDER, LYOPHILIZED, FOR SOLUTION INTRAVENOUS at 09:39

## 2018-11-09 RX ADMIN — INSULIN LISPRO 2 UNITS: 100 INJECTION, SOLUTION INTRAVENOUS; SUBCUTANEOUS at 11:49

## 2018-11-09 RX ADMIN — LISINOPRIL 40 MG: 20 TABLET ORAL at 08:03

## 2018-11-09 RX ADMIN — INSULIN LISPRO 2 UNITS: 100 INJECTION, SOLUTION INTRAVENOUS; SUBCUTANEOUS at 07:58

## 2018-11-09 RX ADMIN — APIXABAN 5 MG: 5 TABLET, FILM COATED ORAL at 08:03

## 2018-11-09 RX ADMIN — AMLODIPINE BESYLATE 5 MG: 5 TABLET ORAL at 08:04

## 2018-11-09 NOTE — NURSING NOTE
Reviewed discharge instructions with patient re: medications, diet, activity limits, outpatient diagnostic testing and follow up appointments to be kept  Patient indicates understanding of same

## 2018-11-09 NOTE — PLAN OF CARE
DISCHARGE PLANNING     Discharge to home or other facility with appropriate resources Adequate for Discharge        INFECTION - ADULT     Absence or prevention of progression during hospitalization Adequate for Discharge     Absence of fever/infection during neutropenic period Adequate for Discharge        Knowledge Deficit     Patient/family/caregiver demonstrates understanding of disease process, treatment plan, medications, and discharge instructions Adequate for Discharge        Nutrition/Hydration-ADULT     Nutrient/Hydration intake appropriate for improving, restoring or maintaining nutritional needs Adequate for Discharge        PAIN - ADULT     Verbalizes/displays adequate comfort level or baseline comfort level Adequate for Discharge        Potential for Falls     Patient will remain free of falls Adequate for Discharge        SAFETY ADULT     Maintain or return to baseline ADL function Adequate for Discharge     Maintain or return mobility status to optimal level Adequate for Discharge

## 2018-11-09 NOTE — DISCHARGE SUMMARY
IMR DISCHARGE SUMMARY     Milad Dial    79 y o  male  MRN: [id number]  Room/Bed: [place] [address]/[address]   Encounter: [id number]    DISCHARGE INFORMATION     PCP at Discharge: Kath Xiong MD    Admitting Provider: Hali Bernard MD  Admission Date: 11/6/2018    Discharge Provider: Hali Bernard MD  Discharge Date: 11/9/2018    Discharge Disposition: Home  Discharge Condition: stable  Discharge with Lines: no    Discharge Diet: regular diet  Activity Restrictions: none  Test Results Pending at Discharge: Repeat CT chest without contrast in 6 months    Discharge Diagnoses:  Principal Problem:    Atrial fibrillation with RVR ([facility] [address] )    Active Problems:    Cellulitis of right lower extremity    Hyperlipidemia    History of TIA (transient ischemic attack)    Type 2 diabetes mellitus with diabetic neuropathy ([facility] [address] )    Pressure injury of right foot, stage 3 ([facility] [address] )    Right middle lobe pulmonary nodule    Resolved Problems:    Acute kidney injury ([facility] [address] )    Consulting Providers:  Michele Lewis DPM (Podiatry)    Diagnostic & Therapeutic Procedures Performed:  XR Foot 3+ Vw Right  · Result Date: 11/8/2018  · Impression: No radiographic evidence of osteomyelitis  CTA ED Chest PE Study  · Result Date: 11/6/2018  · Impression: No acute pathology  No pulmonary embolism  Right middle lobe 7 mm nodule  Mild COPD  Based on current Fleischner Society 2017 Guidelines on incidental pulmonary nodule, followup non-contrast CT is recommended at 6-12 months from the initial examination and, if stable at that time, an additional  followup is recommended for 18-24 months from the initial examination  Bilateral hilar adenopathy, of uncertain etiology and significance  This can be reevaluated on follow-up exam as above       Code Status: Level 1 - Full Code  Advance Directive & Living Will: <no information>  Power of :    POLST:      Medications:    STOP taking the following medications    Details   Rosuvastatin (CRESTOR) 5 mg tablet           CONTINUE these medications which have NOT CHANGED    Details   Acetaminophen (TYLENOL) 325 MG CAPS Take 650 mg by mouth every 6 (six) hours      amLODIPine (NORVASC) 5 mg tablet Take 1 tablet (5 mg total) by mouth daily  Qty: 90 tablet, Refills: 3    Associated Diagnoses: Essential hypertension      aspirin 81 MG tablet Take 81 mg by mouth daily      glimepiride (AMARYL) 4 mg tablet TAKE ONE TABLET BY MOUTH TWICE DAILY  Qty: 180 tablet, Refills: 3    Associated Diagnoses: Type 2 diabetes mellitus without complication, without long-term current use of insulin (HCC)      lisinopril (ZESTRIL) 40 mg tablet TAKE 1 TABLET BY MOUTH DAILY  Qty: 90 tablet, Refills: 3    Associated Diagnoses: Essential hypertension      metFORMIN (GLUCOPHAGE-XR) 750 mg 24 hr tablet ONE BID  Qty: 180 tablet, Refills: 3    Associated Diagnoses: Type 2 diabetes mellitus without complication, unspecified whether long term insulin use (HCC)      Atorvastatin (Lipitor) 40 mg tablet Take 1 tablet (40 mg total) by mouth daily at bedtime  Qty: 30 tablet, Refills: 0    Associated Diagnoses: Hyperlipidemia, unspecified hyperlipidemia type      acyclovir (ZOVIRAX) 5 % ointment every 3 (three) hours      Blood Glucose Monitoring Suppl (ACCU-CHEK JYOTI SMARTVIEW) w/Device KIT use to check Blood Sugar as needed      glucose blood (ACCU-CHEK SMARTVIEW) test strip 1 each by Other route as needed (once a day) for up to 90 days Use as instructed  Qty: 100 each, Refills: 12    Associated Diagnoses: Type 2 diabetes mellitus without complication, unspecified whether long term insulin use (HCC)      Misc Natural Products (GLUCOSAMINE CHOND DOUBLE STR PO) Take by mouth      Misc Natural Products (JOINT HEALTH) CAPS Take by mouth      sitaGLIPtin (JANUVIA) 100 mg tablet Take 1 tablet (100 mg total) by mouth daily for 90 days  Qty: 90 tablet, Refills: 3    Associated Diagnoses: Type 2 diabetes mellitus without complication, unspecified whether long term insulin use (HCC)      vardenafil (LEVITRA) 20 MG tablet Take 1 tablet (20 mg total) by mouth daily as needed for erectile dysfunction  Qty: 10 tablet, Refills: 0    Associated Diagnoses: 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      Sulfamethoxazole/trimethoprim (BACTRIM-DS) 800/160 MG tablet Take 1 tablet by mouth every 12 hours for 6 more days  Qty: 12 tablet, Refills: 0    Associated Diagnoses: Cellulitis           Metoprolol tartrate (LOPRESSOR) 50 MG tablet Take 1 tablet by mouth every 12 hours  Qty: 60 tablet, Refills: 0    Associated Diagnoses: Atrial fibrillation        Apixaban (ELIQUIS) 5 MG tablet Take 1 tablet by mouth every 12 hours  Qty: 60 tablet, Refills: 0    Associated Diagnoses: Atrial fibrillation        Allergies: Allergies   Allergen Reactions    Simvastatin Myalgia     FOLLOW-UP     PCP Outpatient Follow-up:   Follow up: Diamante Edwards MD  Location: [address] [place] [address]  Follow up within next: 1-2 weeks    Consulting Providers Follow-up:    Physician name: Dr Deepak Walters  Specialty: Podiatry  Office phone number: [phone]  Follow up within next: 1-2 weeks     Active Issues Requiring Follow-up:    Issue: Cellulitis, right foot ulcer  Responsible Individual: Patient  What is Needed: Complete antibiotics, follow-up with PCP, follow-up with podiatry  Follow-up Appointments Arranged: Yes     Issue: Atrial fibrillation  Responsible Individual: Patient  What is Needed: Follow-up with PCP  Follow-up Appointments Arranged: Yes     Issue: Incidental right middle lobe lung nodule  Responsible Individual: Patient  What is Needed: Follow-up with PCP, repeat CT chest in 6 months  Follow-up Appointments Arranged: Yes     [address] COURSE     51-year-old male presented to [facility] with complaint of recurrent right lower extremity cellulitis    Patient reports that he has had 3 episodes of cellulitis over the past 6 months  This was likely stemming from a right foot ulcer likely secondary to diabetic complication  During initial workup in the emergency department, patient was found to be in atrial fibrillation with rapid ventricular response  In the emergency department, patient received IV Cardizem 20 mg as well as p o  Cardizem 30 mg  Patient was started on Lopressor for ongoing AV adonay blockade  This achieved adequate rate control  Patient was admitted for further workup of atrial fibrillation with RVR (new onset) as well as treatment of right lower extremity cellulitis and right foot ulcer  In regards to workup of atrial fibrillation, patient's thyroid function testing was normal   Echocardiogram revealed normal systolic function with LVEF 55%  There were no regional wall motion abnormalities  Wall thickness was mildly increased and involving the left ventricle  There was mild dilatation of the left and right atria  The IVC was mildly dilated  The patient remained in atrial fibrillation throughout his hospital stay but rate control was achieved using Lopressor with a final dose of 50 mg q 12 hours  For anticoagulation, patient was started on Eliquis 5 mg twice daily  Options for anticoagulation were discussed with the patient and despite high cost of maintaining DOAC for ongoing anticoagulation, patient preferred to pursue Eliquis  For this reason, patient was given a free 30 day supply of Eliquis and instructed to follow up with his primary care physician for ongoing anticoagulation  [id number] score was calculated to be 3  In regards to his right lower extremity cellulitis, patient was treated with IV vancomycin during his hospital stay  Podiatry was consulted for wound care recommendations  Patient was reported to follow with Dr Yesi Shaikh as an outpatient  Patient demonstrated clinical improvement while on IV antibiotics    Unfortunately, no cultures were able to be obtained for sufficient identification and sensitivity testing  For this reason, patient was treated empirically  At time of discharge, patient was prescribed Bactrim DS for an additional 6 days for a total course of 10 days of antibiotic therapy  The patient was advised to follow up with his podiatrist for outpatient wound care management as well as eventual Podiatry surgical intervention  Of note, based on patient's laboratory work, 10 year ASCVD risk was calculated to be 27%  For this reason, high-intensity statin therapy was started  Patient was noted to be on Crestor 5 mg as an outpatient but did note statin intolerance in the past   Patient was monitored once atorvastatin 40 mg was started and noted to tolerate okay without any side effects  For this reason, Crestor was discontinued at time of discharge and a prescription for atorvastatin 40 mg daily was provided to the patient  Also of note, initial workup including CTA of the chest revealed an incidental 7 mm right middle lobe lung nodule  Based on patient's smoking history as well as findings of bilateral hilar adenopathy, patient was advised to have repeat noncontrast CT of the chest in 6 months for surveillance  Order slip for repeat imaging was provided to the patient at time of discharge  On 11/09 patient was evaluated by the primary team and deemed stable for discharge  Patient was instructed on antibiotic therapy as well as ongoing wound care management  Patient was instructed to follow up with his primary care physician as well as Podiatry  Prescriptions were provided to the patient including a free 30 days supply of Eliquis  Physical Exam   Constitutional: He is oriented to person, place, and time  He appears well-developed and well-nourished  No distress  HENT:   Head: Normocephalic and atraumatic  Nose: Nose normal    Mouth/Throat: Oropharynx is clear and moist  No oropharyngeal exudate     Eyes: Conjunctivae and EOM are normal  No scleral icterus  Neck: Neck supple  No JVD present  No tracheal deviation present  Cardiovascular: Normal heart sounds and intact distal pulses  Irregularly irregular, HR in 80s on telemetry   Pulmonary/Chest: Effort normal and breath sounds normal  No respiratory distress  He has no wheezes  He has no rales  He exhibits no tenderness  Abdominal: Soft  Bowel sounds are normal  He exhibits no distension  There is no tenderness  There is no rebound and no guarding  Musculoskeletal: He exhibits no edema or deformity  Neurological: He is alert and oriented to person, place, and time  No cranial nerve deficit  Skin: No rash noted  He is not diaphoretic  There is erythema (Distal RLE, improving)  No pallor  Right foot dressing C/D/I   Psychiatric: He has a normal mood and affect  His behavior is normal  Judgment and thought content normal    Nursing note and vitals reviewed  Discharge Statement:   I spent 30 minutes minutes discharging the patient  This time was spent on the day of discharge  I had direct contact with the patient on the day of discharge  Additional documentation is required if more than 30 minutes were spent on discharge     ==  LIUDMILA Arcos    [address]  Internal Medicine Resident PGY-2

## 2018-11-09 NOTE — DISCHARGE SUMMARY
Please see the discharge summary from the resident I agreed I discussed with the resident patient was seen examined    Patient to be followed up by her family physician and podiatrist

## 2018-11-09 NOTE — SOCIAL WORK
Cm reviewed patient during care coordination rounds  patient is medically stable for d/c today  Cm informed that patient is in agreement with obtaining Eliquis at time of d/c  Cm informed pharmacy and they will delivery mediations to patient's room  Patient is aware that any future fills of this medication will cost $157 28  No other skilled needs identified at time of d/c      CM reviewed d/c planning process including the following: identifying help at home, patient preference for d/c planning needs, Discharge Lounge, [facility] Meds to Bed program, availability of treatment team to discuss questions or concerns patient and/or family may have regarding understanding medications and recognizing signs and symptoms once discharged  CM also encouraged patient to follow up with all recommended appointments after discharge  Patient advised of importance for patient and family to participate in managing patients medical well being

## 2018-11-09 NOTE — TELEPHONE ENCOUNTER
Patient called he said to cancel his appointment today at 130 pm that he is in the hospital so appointment was cancelled and rescheduled to 12/3/18 at 9 am he is aware of new appointment

## 2018-11-11 LAB
BACTERIA BLD CULT: NORMAL
BACTERIA BLD CULT: NORMAL

## 2018-11-20 ENCOUNTER — TRANSCRIBE ORDERS (OUTPATIENT)
Dept: ADMINISTRATIVE | Facility: HOSPITAL | Age: 68
End: 2018-11-20

## 2018-11-20 ENCOUNTER — HOSPITAL ENCOUNTER (OUTPATIENT)
Dept: NON INVASIVE DIAGNOSTICS | Facility: HOSPITAL | Age: 68
Discharge: HOME | End: 2018-11-20
Attending: PODIATRIST
Payer: MEDICARE

## 2018-11-20 ENCOUNTER — APPOINTMENT (OUTPATIENT)
Dept: LAB | Facility: HOSPITAL | Age: 68
End: 2018-11-20
Attending: PODIATRIST
Payer: MEDICARE

## 2018-11-20 DIAGNOSIS — Z01.818 PREOP EXAMINATION: ICD-10-CM

## 2018-11-20 DIAGNOSIS — Z01.818 PREOP EXAMINATION: Primary | ICD-10-CM

## 2018-11-20 LAB
ERYTHROCYTE [DISTWIDTH] IN BLOOD BY AUTOMATED COUNT: 13.4 % (ref 11.6–15.1)
HCT VFR BLD AUTO: 46.2 % (ref 36.5–49.3)
HGB BLD-MCNC: 14.5 G/DL (ref 12–17)
MCH RBC QN AUTO: 30.1 PG (ref 26.8–34.3)
MCHC RBC AUTO-ENTMCNC: 31.4 G/DL (ref 31.4–37.4)
MCV RBC AUTO: 96 FL (ref 82–98)
PLATELET # BLD AUTO: 262 THOUSANDS/UL (ref 149–390)
PMV BLD AUTO: 10.4 FL (ref 8.9–12.7)
QRS AXIS: 40 DEGREES
QRSD INTERVAL: 110 MS
QT INTERVAL: 378 MS
QTC INTERVAL: 416 MS
RBC # BLD AUTO: 4.81 MILLION/UL (ref 3.88–5.62)
T WAVE AXIS: -10 DEGREES
VENTRICULAR RATE: 73 BPM
WBC # BLD AUTO: 9.35 THOUSAND/UL (ref 4.31–10.16)

## 2018-11-20 PROCEDURE — 85027 COMPLETE CBC AUTOMATED: CPT

## 2018-11-20 PROCEDURE — 93005 ELECTROCARDIOGRAM TRACING: CPT

## 2018-11-20 PROCEDURE — 93010 ELECTROCARDIOGRAM REPORT: CPT | Performed by: INTERNAL MEDICINE

## 2018-11-20 PROCEDURE — 36415 COLL VENOUS BLD VENIPUNCTURE: CPT

## 2018-11-30 ENCOUNTER — APPOINTMENT (OUTPATIENT)
Dept: PREADMISSION TESTING | Facility: HOSPITAL | Age: 68
End: 2018-11-30
Payer: MEDICARE

## 2018-11-30 ENCOUNTER — ANESTHESIA EVENT (OUTPATIENT)
Dept: PERIOP | Facility: HOSPITAL | Age: 68
End: 2018-11-30
Payer: MEDICARE

## 2018-11-30 RX ORDER — SODIUM CHLORIDE 9 MG/ML
125 INJECTION, SOLUTION INTRAVENOUS CONTINUOUS
Status: CANCELLED | OUTPATIENT
Start: 2018-12-04

## 2018-11-30 RX ORDER — SITAGLIPTIN 100 MG/1
TABLET, FILM COATED ORAL
Refills: 3 | COMMUNITY
Start: 2018-10-23 | End: 2018-12-03

## 2018-11-30 NOTE — PRE-PROCEDURE INSTRUCTIONS
Pre-Surgery Instructions:   Medication Instructions    Acetaminophen (TYLENOL ARTHRITIS PAIN PO) Patient was instructed by Physician and understands   amLODIPine (NORVASC) 5 mg tablet Patient was instructed by Physician and understands   apixaban (ELIQUIS) 5 mg Patient was instructed by Physician and understands   aspirin 81 MG tablet Patient was instructed by Physician and understands   atorvastatin (LIPITOR) 40 mg tablet Patient was instructed by Physician and understands   glimepiride (AMARYL) 4 mg tablet Patient was instructed by Physician and understands   lisinopril (ZESTRIL) 40 mg tablet Patient was instructed by Physician and understands   metFORMIN (GLUCOPHAGE-XR) 750 mg 24 hr tablet Patient was instructed by Physician and understands   metoprolol tartrate (LOPRESSOR) 50 mg tablet Patient was instructed by Physician and understands   Misc Natural Products (GLUCOSAMINE CHOND DOUBLE STR PO) Patient was instructed by Physician and understands   vardenafil (LEVITRA) 20 MG tablet Patient was instructed by Physician and understands  Call cell [phone] Patient seen by Viviane Felton  instructed to take metoprolol *with a sip of water the morning of surgery  Patient given/ instructed on use of chlorhexidine soap per hospital protocol    Patient instructed to stop all ASA, NSAIDS, vitamins and herbal supplements one week prior to surgery or per Dr Lindsay Schwab

## 2018-11-30 NOTE — ANESTHESIA PREPROCEDURE EVALUATION
Review of Systems/Medical History  Patient summary reviewed  Chart reviewed  No history of anesthetic complications     Cardiovascular  Hyperlipidemia, Hypertension on > 1 medication, Dysrhythmias , atrial fibrillation,    Pulmonary       GI/Hepatic  Negative GI/hepatic ROS          Negative  ROS        Endo/Other  Diabetes well controlled type 2 Oral agent,      GYN       Hematology  Negative hematology ROS      Musculoskeletal       Neurology  Negative neurology ROS      Psychology   Negative psychology ROS              Physical Exam    Airway    Mallampati score: II  TM Distance: >3 FB  Neck ROM: full     Dental   No notable dental hx     Cardiovascular  Rhythm: irregular, Rate: normal,     Pulmonary  Pulmonary exam normal Breath sounds clear to auscultation,     Other Findings        Anesthesia Plan  ASA Score- 2     Anesthesia Type- IV sedation with anesthesia with ASA Monitors  Additional Monitors:   Airway Plan:         Plan Factors-Patient not instructed to abstain from smoking on day of procedure  Patient did not smoke on day of surgery  Induction- intravenous  Postoperative Plan-     Informed Consent- Anesthetic plan and risks discussed with patient

## 2018-12-03 ENCOUNTER — TELEPHONE (OUTPATIENT)
Dept: FAMILY MEDICINE CLINIC | Facility: CLINIC | Age: 68
End: 2018-12-03

## 2018-12-03 ENCOUNTER — CONSULT (OUTPATIENT)
Dept: FAMILY MEDICINE CLINIC | Facility: CLINIC | Age: 68
End: 2018-12-03
Payer: MEDICARE

## 2018-12-03 VITALS
BODY MASS INDEX: 23.8 KG/M2 | SYSTOLIC BLOOD PRESSURE: 146 MMHG | DIASTOLIC BLOOD PRESSURE: 84 MMHG | HEIGHT: 77 IN | TEMPERATURE: 98.5 F | WEIGHT: 201.6 LBS | HEART RATE: 72 BPM

## 2018-12-03 DIAGNOSIS — R91.1 RIGHT MIDDLE LOBE PULMONARY NODULE: ICD-10-CM

## 2018-12-03 DIAGNOSIS — L03.115 CELLULITIS OF RIGHT LOWER EXTREMITY: ICD-10-CM

## 2018-12-03 DIAGNOSIS — L89.893 PRESSURE INJURY OF RIGHT FOOT, STAGE 3 (HCC): ICD-10-CM

## 2018-12-03 DIAGNOSIS — E11.9 TYPE 2 DIABETES MELLITUS WITHOUT COMPLICATION, UNSPECIFIED WHETHER LONG TERM INSULIN USE (HCC): ICD-10-CM

## 2018-12-03 DIAGNOSIS — Z86.73 HISTORY OF TIA (TRANSIENT ISCHEMIC ATTACK): ICD-10-CM

## 2018-12-03 DIAGNOSIS — E11.40 TYPE 2 DIABETES MELLITUS WITH DIABETIC NEUROPATHY, UNSPECIFIED WHETHER LONG TERM INSULIN USE (HCC): Primary | Chronic | ICD-10-CM

## 2018-12-03 DIAGNOSIS — I48.91 ATRIAL FIBRILLATION WITH RVR (HCC): ICD-10-CM

## 2018-12-03 DIAGNOSIS — E78.5 HYPERLIPIDEMIA, UNSPECIFIED HYPERLIPIDEMIA TYPE: Chronic | ICD-10-CM

## 2018-12-03 PROCEDURE — 99214 OFFICE O/P EST MOD 30 MIN: CPT | Performed by: FAMILY MEDICINE

## 2018-12-03 RX ORDER — A/SINGAPORE/GP1908/2015 IVR-180 (H1N1) (AN A/MICHIGAN/45/2015 (H1N1)PDM09-LIKE VIRUS), A/HONG KONG/4801/2014, NYMC X-263B (H3N2) (AN A/HONG KONG/4801/2014-LIKE VIRUS), AND B/BRISBANE/60/2008, WILD TYPE (A B/BRISBANE/60/2008-LIKE VIRUS) 15; 15; 15 UG/.5ML; UG/.5ML; UG/.5ML
INJECTION, SUSPENSION INTRAMUSCULAR
Refills: 0 | Status: ON HOLD | COMMUNITY
Start: 2018-11-19 | End: 2018-12-04 | Stop reason: ALTCHOICE

## 2018-12-03 NOTE — PATIENT INSTRUCTIONS
STOP ASPIRIN PERMANENTLY WHILE ON ELIQUUS    NO GLIMIPERIDE 4 MG TONITE OR TOMORROW AT ALL   NO LISINOPRIL TODAY OR TOMORROW      ALL OTHER MEDS RESUME AS BEFORE

## 2018-12-03 NOTE — PROGRESS NOTES
[facility] PRE-OPERATIVE EVALUATION  [facility] PHYSICIAN GROUP - [address], [address]  [place] [facility]    NAME: Isidoro Unger  AGE: 79 y o  SEX: male  : 1950     DATE: 12/3/2018    [facility] Pre-Operative Evaluation      Chief Complaint: Pre-operative Evaluation     Surgery: PODIATRIC SURGERY R FOOT   Anticipated Date of Surgery: 18     History of Present Illness:     Patient has no prior history of bleeding issues or blood clots  Chronic conditions, medications and allergies were reviewed  There is no currently active infectious process  Assessment of Cardiac Risk:  · No unstable or severe angina or MI in the last 6 weeks or history of stent placement in the last year   · No decompensated heart failure (e g  New onset heart failure, NYHA functional class IV heart failure, or worsening existing heart failure) in past 3 mos  · No severe heart valve disease including aortic stenosis or symptomatic mitral stenosis     Exercise Capacity:  · Able to walk 4 blocks without symptoms  · Able to walk 2 flights without symptoms    NO Prior Anesthesia Reactions       No prolonged steroid use in past 6 mos  P A T  If done reviewed  Review of Systems:     Review of Systems   Constitutional: Negative for activity change and appetite change  HENT: Negative for trouble swallowing  Eyes: Negative for visual disturbance  Respiratory: Negative for cough and shortness of breath  Cardiovascular: Negative for chest pain, palpitations and leg swelling  Gastrointestinal: Negative for abdominal pain and blood in stool  Endocrine: Negative for polyuria  Genitourinary: Negative for difficulty urinating and hematuria  Skin: Negative for rash  Neurological: Negative for dizziness  Psychiatric/Behavioral: Negative for behavioral problems         Current Problem List:     Patient Active Problem List   Diagnosis    Cellulitis of right lower extremity    Hyperlipidemia    History of TIA (transient ischemic attack)    Type 2 diabetes mellitus with diabetic neuropathy (HCC)    Atrial fibrillation with RVR (HCC)    Pressure injury of right foot, stage 3 (HCC)    Right middle lobe pulmonary nodule       Allergies:      Allergies   Allergen Reactions    Simvastatin Myalgia       Current Medications:       Current Outpatient Prescriptions:     Acetaminophen (TYLENOL ARTHRITIS PAIN PO), Take 650 mg by mouth 2 (two) times a day, Disp: , Rfl:     acyclovir (ZOVIRAX) 5 % ointment, every 3 (three) hours, Disp: , Rfl:     amLODIPine (NORVASC) 5 mg tablet, Take 1 tablet (5 mg total) by mouth daily, Disp: 90 tablet, Rfl: 3    atorvastatin (LIPITOR) 40 mg tablet, Take 1 tablet (40 mg total) by mouth daily with dinner, Disp: 30 tablet, Rfl: 0    Blood Glucose Monitoring Suppl (ACCU-CHEK JYOTI SMARTVIEW) w/Device KIT, use to check Blood Sugar as needed, Disp: , Rfl:     FLUAD 0 5 ML JACKELYN, ADM 0 5ML IM UTD, Disp: , Rfl: 0    glimepiride (AMARYL) 4 mg tablet, TAKE ONE TABLET BY MOUTH TWICE DAILY, Disp: 180 tablet, Rfl: 3    glucose blood (ACCU-CHEK SMARTVIEW) test strip, Use as instructed BID, Disp: 200 each, Rfl: 12    lisinopril (ZESTRIL) 40 mg tablet, TAKE 1 TABLET BY MOUTH DAILY (Patient taking differently: TAKE 1 TABLET BY MOUTH after lunch), Disp: 90 tablet, Rfl: 3    metFORMIN (GLUCOPHAGE-XR) 750 mg 24 hr tablet, ONE BID (Patient taking differently: 750 mg ONE BID ), Disp: 180 tablet, Rfl: 3    metoprolol tartrate (LOPRESSOR) 50 mg tablet, Take 1 tablet (50 mg total) by mouth every 12 (twelve) hours, Disp: 60 tablet, Rfl: 0    Misc Natural Products (GLUCOSAMINE CHOND DOUBLE STR PO), Take by mouth 2 (two) times a day  , Disp: , Rfl:     sitaGLIPtin (JANUVIA) 100 mg tablet, Take 1 tablet (100 mg total) by mouth daily for 90 days (Patient taking differently: Take 100 mg by mouth daily after lunch  ), Disp: 90 tablet, Rfl: 3    vardenafil (LEVITRA) 20 MG tablet, Take 1 tablet (20 mg total) by mouth daily as needed for erectile dysfunction, Disp: 10 tablet, Rfl: 0    Past Medical History:       Past Medical History:   Diagnosis Date    Arthritis     Atrial fibrillation ([facility] )     Diagnosed 11/2018    Benign prostate hyperplasia 04/2002    Cellulitis     right lower leg     Colon polyp 2006    [facility]     Hearing aid worn     bilat    Hearing loss     90% loss left ear and 40% right ear    History of clubfoot     "since birth"    History of pneumonia     History of TIA (transient ischemic attack) 04/25/2017 11/30/18 pt denies    Hyperlipidemia 5/15/2014    Hypertension     Infectious viral hepatitis     "cant remember type"    Irregular heart beat     Neuropathy     both feet    Osteomyelitis ([facility] )     right great toe    Right middle lobe pulmonary nodule 11/6/2018    Seasickness     Teeth missing     Type 2 diabetes mellitus with diabetic neuropathy ([facility] ) 11/6/2018    Wears glasses     reading    Wound, open     bottom of right foot        Past Surgical History:   Procedure Laterality Date    CLUB FOOT RELEASE Bilateral     COLONOSCOPY      TOE AMPUTATION Right     partial great toe    VASECTOMY  1992        Family History   Problem Relation Age of Onset    Diabetes Father         mellitus        Social History     Social History    Marital status: /Civil Union     Spouse name: N/A    Number of children: N/A    Years of education: N/A     Occupational History    Not on file       Social History Main Topics    Smoking status: Former Smoker     Types: Cigarettes     Quit date: 11/7/1988    Smokeless tobacco: Never Used    Alcohol use Yes      Comment: rarely    Drug use: No    Sexual activity: Not on file     Other Topics Concern    Not on file     Social History Narrative    No narrative on file        Physical Exam:     /84 (BP Location: Left arm, Patient Position: Sitting, Cuff Size: Large)   Pulse 72 Temp 98 5 °F (36 9 °C)   Ht 6' 4 5" (1 943 m)   Wt 91 4 kg (201 lb 9 6 oz)   BMI 24 22 kg/m²     Physical Exam   Constitutional: He appears well-developed and well-nourished  HENT:   Head: Normocephalic and atraumatic  Eyes: Conjunctivae are normal    Neck: Neck supple  No thyromegaly present  Cardiovascular: Normal rate, regular rhythm, normal heart sounds and intact distal pulses  No murmur heard  Pulmonary/Chest: Effort normal and breath sounds normal  No respiratory distress  Musculoskeletal: He exhibits no edema  Lymphadenopathy:     He has no cervical adenopathy  Skin: Skin is warm and dry  Psychiatric: He has a normal mood and affect  His behavior is normal        Operative site has been examined and clear of skin infection and inflammation  Noac/ ASPIRIN : ON HOLD :       Assessment & Recommendations:     Patient is cleared for surgery as detailed above       Surgical Procedure risk category: LOW    Patient specific operative risk categegory: MODERATE

## 2018-12-03 NOTE — TELEPHONE ENCOUNTER
Maura from [facility]'s pre admission called she said the office note from today for his pre op clearance isn't signed does show he is cleared for surgery she said that the office note needs to be signed his surgery is on 12/4/18    TY

## 2018-12-04 ENCOUNTER — ANESTHESIA (OUTPATIENT)
Dept: PERIOP | Facility: HOSPITAL | Age: 68
End: 2018-12-04
Payer: MEDICARE

## 2018-12-04 ENCOUNTER — HOSPITAL ENCOUNTER (OUTPATIENT)
Facility: HOSPITAL | Age: 68
Setting detail: OUTPATIENT SURGERY
Discharge: HOME | End: 2018-12-04
Attending: PODIATRIST | Admitting: PODIATRIST
Payer: MEDICARE

## 2018-12-04 ENCOUNTER — APPOINTMENT (OUTPATIENT)
Dept: RADIOLOGY | Facility: HOSPITAL | Age: 68
End: 2018-12-04
Payer: MEDICARE

## 2018-12-04 VITALS
OXYGEN SATURATION: 97 % | BODY MASS INDEX: 27.63 KG/M2 | HEIGHT: 77 IN | SYSTOLIC BLOOD PRESSURE: 126 MMHG | HEART RATE: 75 BPM | TEMPERATURE: 97.9 F | WEIGHT: 234 LBS | DIASTOLIC BLOOD PRESSURE: 66 MMHG | RESPIRATION RATE: 16 BRPM

## 2018-12-04 DIAGNOSIS — L03.115 CELLULITIS OF RIGHT LOWER EXTREMITY: ICD-10-CM

## 2018-12-04 DIAGNOSIS — Z98.890 S/P FOOT SURGERY, RIGHT: Primary | ICD-10-CM

## 2018-12-04 DIAGNOSIS — E11.621 TYPE 2 DIABETES MELLITUS WITH FOOT ULCER (CODE) (HCC): ICD-10-CM

## 2018-12-04 LAB
GLUCOSE SERPL-MCNC: 123 MG/DL (ref 65–140)
GLUCOSE SERPL-MCNC: 153 MG/DL (ref 65–140)

## 2018-12-04 PROCEDURE — 87070 CULTURE OTHR SPECIMN AEROBIC: CPT | Performed by: PODIATRIST

## 2018-12-04 PROCEDURE — 82948 REAGENT STRIP/BLOOD GLUCOSE: CPT

## 2018-12-04 PROCEDURE — 87075 CULTR BACTERIA EXCEPT BLOOD: CPT | Performed by: PODIATRIST

## 2018-12-04 PROCEDURE — 88300 SURGICAL PATH GROSS: CPT | Performed by: PATHOLOGY

## 2018-12-04 PROCEDURE — 73630 X-RAY EXAM OF FOOT: CPT

## 2018-12-04 PROCEDURE — 87205 SMEAR GRAM STAIN: CPT | Performed by: PODIATRIST

## 2018-12-04 PROCEDURE — 87176 TISSUE HOMOGENIZATION CULTR: CPT | Performed by: PODIATRIST

## 2018-12-04 RX ORDER — SODIUM CHLORIDE 9 MG/ML
125 INJECTION, SOLUTION INTRAVENOUS CONTINUOUS
Status: DISCONTINUED | OUTPATIENT
Start: 2018-12-04 | End: 2018-12-04 | Stop reason: HOSPADM

## 2018-12-04 RX ORDER — OXYCODONE HYDROCHLORIDE AND ACETAMINOPHEN 5; 325 MG/1; MG/1
1 TABLET ORAL EVERY 6 HOURS PRN
Status: DISCONTINUED | OUTPATIENT
Start: 2018-12-04 | End: 2018-12-04 | Stop reason: HOSPADM

## 2018-12-04 RX ORDER — PROPOFOL 10 MG/ML
INJECTION, EMULSION INTRAVENOUS AS NEEDED
Status: DISCONTINUED | OUTPATIENT
Start: 2018-12-04 | End: 2018-12-04 | Stop reason: SURG

## 2018-12-04 RX ORDER — BUPIVACAINE HYDROCHLORIDE 5 MG/ML
INJECTION, SOLUTION PERINEURAL AS NEEDED
Status: DISCONTINUED | OUTPATIENT
Start: 2018-12-04 | End: 2018-12-04 | Stop reason: HOSPADM

## 2018-12-04 RX ORDER — ASPIRIN 81 MG/1
81 TABLET, CHEWABLE ORAL DAILY
COMMUNITY
End: 2019-01-04

## 2018-12-04 RX ORDER — FENTANYL CITRATE/PF 50 MCG/ML
50 SYRINGE (ML) INJECTION
Status: DISCONTINUED | OUTPATIENT
Start: 2018-12-04 | End: 2018-12-04 | Stop reason: HOSPADM

## 2018-12-04 RX ORDER — CEPHALEXIN 500 MG/1
500 CAPSULE ORAL EVERY 8 HOURS SCHEDULED
Qty: 30 CAPSULE | Refills: 0 | Status: SHIPPED | OUTPATIENT
Start: 2018-12-04 | End: 2018-12-14

## 2018-12-04 RX ORDER — ONDANSETRON 2 MG/ML
INJECTION INTRAMUSCULAR; INTRAVENOUS AS NEEDED
Status: DISCONTINUED | OUTPATIENT
Start: 2018-12-04 | End: 2018-12-04 | Stop reason: SURG

## 2018-12-04 RX ORDER — ALBUTEROL SULFATE 2.5 MG/3ML
2.5 SOLUTION RESPIRATORY (INHALATION) ONCE AS NEEDED
Status: DISCONTINUED | OUTPATIENT
Start: 2018-12-04 | End: 2018-12-04 | Stop reason: HOSPADM

## 2018-12-04 RX ORDER — MEPERIDINE HYDROCHLORIDE 50 MG/ML
12.5 INJECTION INTRAMUSCULAR; INTRAVENOUS; SUBCUTANEOUS AS NEEDED
Status: DISCONTINUED | OUTPATIENT
Start: 2018-12-04 | End: 2018-12-04 | Stop reason: HOSPADM

## 2018-12-04 RX ORDER — CEFAZOLIN SODIUM 2 G/50ML
2000 SOLUTION INTRAVENOUS ONCE
Status: COMPLETED | OUTPATIENT
Start: 2018-12-04 | End: 2018-12-04

## 2018-12-04 RX ORDER — MIDAZOLAM HYDROCHLORIDE 1 MG/ML
INJECTION INTRAMUSCULAR; INTRAVENOUS AS NEEDED
Status: DISCONTINUED | OUTPATIENT
Start: 2018-12-04 | End: 2018-12-04 | Stop reason: SURG

## 2018-12-04 RX ORDER — FENTANYL CITRATE 50 UG/ML
INJECTION, SOLUTION INTRAMUSCULAR; INTRAVENOUS AS NEEDED
Status: DISCONTINUED | OUTPATIENT
Start: 2018-12-04 | End: 2018-12-04 | Stop reason: SURG

## 2018-12-04 RX ORDER — DEXAMETHASONE SODIUM PHOSPHATE 4 MG/ML
INJECTION, SOLUTION INTRA-ARTICULAR; INTRALESIONAL; INTRAMUSCULAR; INTRAVENOUS; SOFT TISSUE AS NEEDED
Status: DISCONTINUED | OUTPATIENT
Start: 2018-12-04 | End: 2018-12-04 | Stop reason: SURG

## 2018-12-04 RX ADMIN — ONDANSETRON HYDROCHLORIDE 4 MG: 2 INJECTION, SOLUTION INTRAVENOUS at 07:58

## 2018-12-04 RX ADMIN — SODIUM CHLORIDE: 0.9 INJECTION, SOLUTION INTRAVENOUS at 08:05

## 2018-12-04 RX ADMIN — SODIUM CHLORIDE 125 ML/HR: 0.9 INJECTION, SOLUTION INTRAVENOUS at 06:27

## 2018-12-04 RX ADMIN — MIDAZOLAM 2 MG: 1 INJECTION INTRAMUSCULAR; INTRAVENOUS at 07:33

## 2018-12-04 RX ADMIN — FENTANYL CITRATE 50 MCG: 50 INJECTION, SOLUTION INTRAMUSCULAR; INTRAVENOUS at 07:50

## 2018-12-04 RX ADMIN — PROPOFOL 200 MG: 10 INJECTION, EMULSION INTRAVENOUS at 07:42

## 2018-12-04 RX ADMIN — CEFAZOLIN SODIUM 2000 MG: 2 SOLUTION INTRAVENOUS at 07:42

## 2018-12-04 RX ADMIN — LIDOCAINE HYDROCHLORIDE 60 MG: 20 INJECTION, SOLUTION INTRAVENOUS at 07:42

## 2018-12-04 RX ADMIN — LIDOCAINE HYDROCHLORIDE 40 MG: 20 INJECTION, SOLUTION INTRAVENOUS at 08:49

## 2018-12-04 RX ADMIN — DEXAMETHASONE SODIUM PHOSPHATE 4 MG: 4 INJECTION, SOLUTION INTRAMUSCULAR; INTRAVENOUS at 07:58

## 2018-12-04 RX ADMIN — FENTANYL CITRATE 50 MCG: 50 INJECTION, SOLUTION INTRAMUSCULAR; INTRAVENOUS at 08:15

## 2018-12-04 NOTE — DISCHARGE INSTRUCTIONS
Podiatry: Keep dressing clean, dry, and intact to the right foot until follow-up with Dr Joshi No  Remain non-weight bearing to the right foot with surgical shoe and crutch or walker assist  Take antibiotics as prescribed

## 2018-12-04 NOTE — H&P (VIEW-ONLY)
[facility] PRE-OPERATIVE EVALUATION  [facility] PHYSICIAN GROUP - [address]  [place]    NAME: Ray Kim  AGE: 79 y o  SEX: male  : 1950     DATE: 12/3/2018    [facility] Pre-Operative Evaluation      Chief Complaint: Pre-operative Evaluation     Surgery: PODIATRIC SURGERY R FOOT   Anticipated Date of Surgery: 18     History of Present Illness:     Patient has no prior history of bleeding issues or blood clots  Chronic conditions, medications and allergies were reviewed  There is no currently active infectious process  Assessment of Cardiac Risk:  · No unstable or severe angina or MI in the last 6 weeks or history of stent placement in the last year   · No decompensated heart failure (e g  New onset heart failure, NYHA functional class IV heart failure, or worsening existing heart failure) in past 3 mos  · No severe heart valve disease including aortic stenosis or symptomatic mitral stenosis     Exercise Capacity:  · Able to walk 4 blocks without symptoms  · Able to walk 2 flights without symptoms    NO Prior Anesthesia Reactions       No prolonged steroid use in past 6 mos  P A T  If done reviewed  Review of Systems:     Review of Systems   Constitutional: Negative for activity change and appetite change  HENT: Negative for trouble swallowing  Eyes: Negative for visual disturbance  Respiratory: Negative for cough and shortness of breath  Cardiovascular: Negative for chest pain, palpitations and leg swelling  Gastrointestinal: Negative for abdominal pain and blood in stool  Endocrine: Negative for polyuria  Genitourinary: Negative for difficulty urinating and hematuria  Skin: Negative for rash  Neurological: Negative for dizziness  Psychiatric/Behavioral: Negative for behavioral problems         Current Problem List:     Patient Active Problem List   Diagnosis    Cellulitis of right lower extremity    Hyperlipidemia    History of TIA (transient ischemic attack)    Type 2 diabetes mellitus with diabetic neuropathy (HCC)    Atrial fibrillation with RVR (HCC)    Pressure injury of right foot, stage 3 (HCC)    Right middle lobe pulmonary nodule       Allergies:      Allergies   Allergen Reactions    Simvastatin Myalgia       Current Medications:       Current Outpatient Prescriptions:     Acetaminophen (TYLENOL ARTHRITIS PAIN PO), Take 650 mg by mouth 2 (two) times a day, Disp: , Rfl:     acyclovir (ZOVIRAX) 5 % ointment, every 3 (three) hours, Disp: , Rfl:     amLODIPine (NORVASC) 5 mg tablet, Take 1 tablet (5 mg total) by mouth daily, Disp: 90 tablet, Rfl: 3    atorvastatin (LIPITOR) 40 mg tablet, Take 1 tablet (40 mg total) by mouth daily with dinner, Disp: 30 tablet, Rfl: 0    Blood Glucose Monitoring Suppl (ACCU-CHEK JYOTI SMARTVIEW) w/Device KIT, use to check Blood Sugar as needed, Disp: , Rfl:     FLUAD 0 5 ML JACKELYN, ADM 0 5ML IM UTD, Disp: , Rfl: 0    glimepiride (AMARYL) 4 mg tablet, TAKE ONE TABLET BY MOUTH TWICE DAILY, Disp: 180 tablet, Rfl: 3    glucose blood (ACCU-CHEK SMARTVIEW) test strip, Use as instructed BID, Disp: 200 each, Rfl: 12    lisinopril (ZESTRIL) 40 mg tablet, TAKE 1 TABLET BY MOUTH DAILY (Patient taking differently: TAKE 1 TABLET BY MOUTH after lunch), Disp: 90 tablet, Rfl: 3    metFORMIN (GLUCOPHAGE-XR) 750 mg 24 hr tablet, ONE BID (Patient taking differently: 750 mg ONE BID ), Disp: 180 tablet, Rfl: 3    metoprolol tartrate (LOPRESSOR) 50 mg tablet, Take 1 tablet (50 mg total) by mouth every 12 (twelve) hours, Disp: 60 tablet, Rfl: 0    Misc Natural Products (GLUCOSAMINE CHOND DOUBLE STR PO), Take by mouth 2 (two) times a day  , Disp: , Rfl:     sitaGLIPtin (JANUVIA) 100 mg tablet, Take 1 tablet (100 mg total) by mouth daily for 90 days (Patient taking differently: Take 100 mg by mouth daily after lunch  ), Disp: 90 tablet, Rfl: 3    vardenafil (LEVITRA) 20 MG tablet, Take 1 tablet (20 mg total) by mouth daily as needed for erectile dysfunction, Disp: 10 tablet, Rfl: 0    Past Medical History:       Past Medical History:   Diagnosis Date    Arthritis     Atrial fibrillation ([facility] )     Diagnosed 11/2018    Benign prostate hyperplasia 04/2002    Cellulitis     right lower leg     Colon polyp 2006    [facility]     Hearing aid worn     bilat    Hearing loss     90% loss left ear and 40% right ear    History of clubfoot     "since birth"    History of pneumonia     History of TIA (transient ischemic attack) 04/25/2017 11/30/18 pt denies    Hyperlipidemia 5/15/2014    Hypertension     Infectious viral hepatitis     "cant remember type"    Irregular heart beat     Neuropathy     both feet    Osteomyelitis ([facility] )     right great toe    Right middle lobe pulmonary nodule 11/6/2018    Seasickness     Teeth missing     Type 2 diabetes mellitus with diabetic neuropathy ([facility] ) 11/6/2018    Wears glasses     reading    Wound, open     bottom of right foot        Past Surgical History:   Procedure Laterality Date    CLUB FOOT RELEASE Bilateral     COLONOSCOPY      TOE AMPUTATION Right     partial great toe    VASECTOMY  1992        Family History   Problem Relation Age of Onset    Diabetes Father         mellitus        Social History     Social History    Marital status: /Civil Union     Spouse name: N/A    Number of children: N/A    Years of education: N/A     Occupational History    Not on file       Social History Main Topics    Smoking status: Former Smoker     Types: Cigarettes     Quit date: 11/7/1988    Smokeless tobacco: Never Used    Alcohol use Yes      Comment: rarely    Drug use: No    Sexual activity: Not on file     Other Topics Concern    Not on file     Social History Narrative    No narrative on file        Physical Exam:     /84 (BP Location: Left arm, Patient Position: Sitting, Cuff Size: Large)   Pulse 72 Temp 98 5 °F (36 9 °C)   Ht 6' 4 5" (1 943 m)   Wt 91 4 kg (201 lb 9 6 oz)   BMI 24 22 kg/m²     Physical Exam   Constitutional: He appears well-developed and well-nourished  HENT:   Head: Normocephalic and atraumatic  Eyes: Conjunctivae are normal    Neck: Neck supple  No thyromegaly present  Cardiovascular: Normal rate, regular rhythm, normal heart sounds and intact distal pulses  No murmur heard  Pulmonary/Chest: Effort normal and breath sounds normal  No respiratory distress  Musculoskeletal: He exhibits no edema  Lymphadenopathy:     He has no cervical adenopathy  Skin: Skin is warm and dry  Psychiatric: He has a normal mood and affect  His behavior is normal        Operative site has been examined and clear of skin infection and inflammation  Noac/ ASPIRIN : ON HOLD :       Assessment & Recommendations:     Patient is cleared for surgery as detailed above       Surgical Procedure risk category: LOW    Patient specific operative risk categegory: MODERATE

## 2018-12-04 NOTE — ANESTHESIA POSTPROCEDURE EVALUATION
Post-Op Assessment Note      CV Status:  Stable    Mental Status:  Alert and awake    Hydration Status:  Euvolemic    PONV Controlled:  Controlled    Airway Patency:  Patent    Post Op Vitals Reviewed:  Yes              BP      Temp 97 9 °F (36 6 °C) (12/04/18 0924)    Pulse 72 (12/04/18 0924)   Resp 21 (12/04/18 0924)    SpO2 98 % (12/04/18 0924)

## 2018-12-04 NOTE — OP NOTE
OPERATIVE REPORT  PATIENT NAME: Gema Magallon  :  1950  MRN: [id number]  Pt Location: [facility] OR ROOM 08    SURGERY DATE: 2018    Surgeon(s) and Role:     * Corona Tam DPM - Primary     * Gisselle Castillo DPM - Assisting    Preop Diagnosis:  Type 2 diabetes mellitus with foot ulcer (CODE) ([facility] ) [E11 621]    Post-Op Diagnosis Codes: * Type 2 diabetes mellitus with foot ulcer (CODE) (HCC) [E11 621]    Procedure(s) (LRB):  5TH METATARSAL BONE PARTIAL RESECTION, FULL THICKNESS DEBRIDEMENT OF DIABETIC ULCER (Right)    Specimen(s):  ID Type Source Tests Collected by Time Destination   1 : 5th metatarsal  Tissue Toe, Right TISSUE EXAM Corona Tam DPM 2018 0815    A : deep cultures right foot Tissue Foot, Right ANAEROBIC CULTURE AND GRAM STAIN Corona Tam DPM 2018 0822    B : deep cultures right foot Tissue Foot, Right CULTURE, TISSUE AND GRAM STAIN Corona Tam DPM 2018 8335      Hemostasis: PNAT about right ankle for 46 minutes at 250mmHg     Estimated Blood Loss:   Minimal    Materials: vicryl, nylon, adaptic, DSD, ACE     Injectables: 10cc 1:1 mix of 1% lidocaine plain and 0 5% marcaine plain preoperatively, 10cc 0 5% marcaine plain postoperatively     Complications: None     Anesthesia Type:   IV Sedation with Anesthesia + local     Operative Indications:  Type 2 diabetes mellitus with foot ulcer (CODE) ([facility] ) [E11 621]     Operative Findings:  Consistent with diagnosis: right 5th metatarsal head with large osteophyte formation and bone regrowth surrounding prior osteotomy site, the right submet head 5 ulcer did not communicate with the 5th metatarsal      Procedure and Technique:  Under mild sedation, the patient was brought into the operating room and placed on the operating room table in the supine position  A pneumatic ankle tourniquet was then placed around the patient's right ankle with ample webril padding   A time out was performed to confirm the correct patient, procedure and site with all parties in agreement  Following IV sedation, local anesthetic was injected into the right foot using 10cc 1:1 mix of 1% lidocaine plain and 0 5% marcaine plain  The foot was then scrubbed, prepped and draped in the usual aseptic manner  An esmarch bandage was utilized to exsanguinate the patient's foot and the pneumatic ankle tourniquet was then inflated  The esmarch bandage was removed and the foot was placed on the operating room table  A skin marker was utilized to plan the incision on the dorso-lateal aspect of the right 5th metatarsal  A sterile 15 blade was utilized to make a controlled-depth incision over this region  Dissection carried deeper utilizing deep blade  Pollock elevator utilized to dissect periosteum off of 5th metatarsal head  The head of the 5th metatarsal was identified and noted to have a large osteophyte formation and bone growth overlying prior osteotomy site  At this time a sagittal saw was utilized to remove a significant portion of 5th metatarsal which was warranted due to the large bony overgrowth  Cut was made from dorsal distal to plantar proximal fashion to avoid creating bony prominences  Any bony prominence which was palpated was removed with rongeur  Region was irrigated with normal saline  A freer was inserted from the plantar ulcer to determine if the ulcer communicated with the bone and it did not  The plantar ulcer had a layer of soft tissue which did not allow communication to the bone  Subcutaneous closure achieved with vicryl and primary skin closure achieved with nylon  At this time attention was directed to the plantar foot at which time the periphery of the ulcer was excisionally debrided to remove hyperkeratotic tissue down to healthy, bleeding, granular tissue, <20cm2  The ulcer measured approximately 1 x 0 5 x 0 2cm with granular base  At this time ulcer was irrigated with normal saline and primarily closed using nylon      A postoperative injection consisting of 10cc 0 5% marcaine plain was injected about the incision site  Incision sites dressed with adaptic, DSD, ACE  The tourniquet was deflated and normal hyperemic flush noted the foot  The patient tolerated the procedures well and was transported to PACU with all vital signs stable       Patient Disposition:  PACU     SIGNATURE: Hannah Schaffer DPM  DATE: December 4, 2018  TIME: 8:56 AM

## 2018-12-04 NOTE — DISCHARGE SUMMARY
Discharge Summary Outpatient Procedure Podiatry -   Isidoro Unger  79 y o  male MRN: [id number]  Unit/Bed#: OR [place] Encounter: [id number]    Admission Date: 12/4/2018     Admitting Diagnosis: Type 2 diabetes mellitus with foot ulcer (CODE) ([facility] ) [E11 621]    Discharge Diagnosis: same    Procedures Performed: 5TH METATARSAL BONE PARTIAL RESECTION, FULL THICKNESS DEBRIDEMENT OF DIABETIC ULCER:  right foot     Complications: none    Condition at Discharge: stable    Discharge instructions/Information to patient and family:   See after visit summary for information provided to patient and family  Provisions for Follow-Up Care/Important appointments:  See after visit summary for information related to follow-up care and any pertinent home health orders  Discharge Medications:  See after visit summary for reconciled discharge medications provided to patient and family

## 2018-12-07 LAB
BACTERIA SPEC ANAEROBE CULT: NO GROWTH
BACTERIA TISS AEROBE CULT: NO GROWTH
GRAM STN SPEC: NORMAL
GRAM STN SPEC: NORMAL

## 2018-12-10 DIAGNOSIS — E78.5 HYPERLIPIDEMIA, UNSPECIFIED HYPERLIPIDEMIA TYPE: Chronic | ICD-10-CM

## 2018-12-10 DIAGNOSIS — I48.91 ATRIAL FIBRILLATION WITH RVR (HCC): ICD-10-CM

## 2018-12-10 RX ORDER — ATORVASTATIN CALCIUM 40 MG/1
40 TABLET, FILM COATED ORAL
Qty: 90 TABLET | Refills: 3 | Status: SHIPPED | OUTPATIENT
Start: 2018-12-10 | End: 2019-12-06 | Stop reason: SDUPTHER

## 2018-12-10 RX ORDER — METOPROLOL TARTRATE 50 MG/1
50 TABLET, FILM COATED ORAL EVERY 12 HOURS SCHEDULED
Qty: 90 TABLET | Refills: 3 | Status: SHIPPED | OUTPATIENT
Start: 2018-12-10 | End: 2019-06-04 | Stop reason: SDUPTHER

## 2018-12-10 NOTE — TELEPHONE ENCOUNTER
Patient called he needs Lopressor 50 Mg and Rx for Atorvastatin 40 mg called into [facility] at [phone] he is out of medication and he is there now  TY

## 2018-12-17 DIAGNOSIS — I48.91 ATRIAL FIBRILLATION, UNSPECIFIED TYPE (HCC): Primary | ICD-10-CM

## 2019-01-04 ENCOUNTER — OFFICE VISIT (OUTPATIENT)
Dept: FAMILY MEDICINE CLINIC | Facility: CLINIC | Age: 69
End: 2019-01-04
Payer: MEDICARE

## 2019-01-04 VITALS
OXYGEN SATURATION: 99 % | SYSTOLIC BLOOD PRESSURE: 144 MMHG | BODY MASS INDEX: 28.76 KG/M2 | RESPIRATION RATE: 18 BRPM | HEART RATE: 89 BPM | TEMPERATURE: 98.2 F | HEIGHT: 76 IN | DIASTOLIC BLOOD PRESSURE: 76 MMHG | WEIGHT: 236.2 LBS

## 2019-01-04 DIAGNOSIS — Z86.73 HISTORY OF TIA (TRANSIENT ISCHEMIC ATTACK): ICD-10-CM

## 2019-01-04 DIAGNOSIS — L89.893 PRESSURE INJURY OF RIGHT FOOT, STAGE 3 (HCC): ICD-10-CM

## 2019-01-04 DIAGNOSIS — E78.5 HYPERLIPIDEMIA, UNSPECIFIED HYPERLIPIDEMIA TYPE: Chronic | ICD-10-CM

## 2019-01-04 DIAGNOSIS — E11.40 TYPE 2 DIABETES MELLITUS WITH DIABETIC NEUROPATHY, UNSPECIFIED WHETHER LONG TERM INSULIN USE (HCC): Primary | Chronic | ICD-10-CM

## 2019-01-04 DIAGNOSIS — I10 ESSENTIAL HYPERTENSION: ICD-10-CM

## 2019-01-04 DIAGNOSIS — I48.91 ATRIAL FIBRILLATION WITH RVR (HCC): ICD-10-CM

## 2019-01-04 PROCEDURE — G0009 ADMIN PNEUMOCOCCAL VACCINE: HCPCS | Performed by: FAMILY MEDICINE

## 2019-01-04 PROCEDURE — 99214 OFFICE O/P EST MOD 30 MIN: CPT | Performed by: FAMILY MEDICINE

## 2019-01-04 PROCEDURE — 90732 PPSV23 VACC 2 YRS+ SUBQ/IM: CPT | Performed by: FAMILY MEDICINE

## 2019-01-04 RX ORDER — SITAGLIPTIN 100 MG/1
TABLET, FILM COATED ORAL
Refills: 3 | COMMUNITY
Start: 2018-12-21 | End: 2019-08-26 | Stop reason: SDUPTHER

## 2019-01-04 RX ORDER — LISINOPRIL 40 MG/1
40 TABLET ORAL DAILY
Qty: 90 TABLET | Refills: 3 | Status: SHIPPED | OUTPATIENT
Start: 2019-01-04 | End: 2019-12-06 | Stop reason: SDUPTHER

## 2019-01-04 RX ORDER — AMLODIPINE BESYLATE 5 MG/1
5 TABLET ORAL DAILY
Qty: 90 TABLET | Refills: 3 | Status: SHIPPED | OUTPATIENT
Start: 2019-01-04 | End: 2019-12-06 | Stop reason: SDUPTHER

## 2019-01-04 NOTE — PROGRESS NOTES
Assessment/Plan:    History of TIA (transient ischemic attack)  No new issues        Diagnoses and all orders for this visit:    Type 2 diabetes mellitus with diabetic neuropathy, unspecified whether long term insulin use (HCC)    Atrial fibrillation with RVR (HCC)  -     PNEUMOCOCCAL POLYSACCHARIDE VACCINE 23-VALENT =>1YO SQ IM    Pressure injury of right foot, stage 3 (HCC)    History of TIA (transient ischemic attack)    Hyperlipidemia, unspecified hyperlipidemia type    Essential hypertension  -     amLODIPine (NORVASC) 5 mg tablet; Take 1 tablet (5 mg total) by mouth daily  -     lisinopril (ZESTRIL) 40 mg tablet; Take 1 tablet (40 mg total) by mouth daily    Other orders  -     JANUVIA 100 MG tablet; TK 1 T PO D          Subjective:      Patient ID: Arina Laird  is a 79 y o  male  PATIENT RETURNS FOR FOLLOW-UP OF CHRONIC MEDICAL CONDITIONS  NO HOSPITAL STAYS OR EMERGENCY VISITS RECENTLY  MEDS WERE REVIEWED AND NO SIDE EFFECTS  NO NEW ISSUES  UNLESS NOTED BELOW  NO NEW MEDICAL PROVIDER REPORTED  Patient is status post surgery on the right foot  An doing well he continues to follow up with a foot specialist         The following portions of the patient's history were reviewed and updated as appropriate: allergies, current medications, past family history, past medical history, past social history, past surgical history and problem list     Review of Systems   Constitutional: Negative for activity change and appetite change  HENT: Negative for trouble swallowing  Eyes: Negative for visual disturbance  Respiratory: Negative for cough and shortness of breath  Cardiovascular: Negative for chest pain, palpitations and leg swelling  Gastrointestinal: Negative for abdominal pain and blood in stool  Endocrine: Negative for polyuria  Genitourinary: Negative for difficulty urinating and hematuria  Skin: Negative for rash  Neurological: Negative for dizziness     Psychiatric/Behavioral: Negative for behavioral problems  Objective:  Vitals:    01/04/19 1525   BP: 144/76   BP Location: Left arm   Patient Position: Sitting   Cuff Size: Large   Pulse: 89   Resp: 18   Temp: 98 2 °F (36 8 °C)   SpO2: 99%   Weight: 107 kg (236 lb 3 2 oz)   Height: 6' 4" (1 93 m)      Physical Exam   Constitutional: He appears well-developed and well-nourished  HENT:   Head: Normocephalic and atraumatic  Eyes: Conjunctivae are normal    Neck: Neck supple  No thyromegaly present  Cardiovascular: Normal rate, regular rhythm, normal heart sounds and intact distal pulses  No murmur heard  Pulmonary/Chest: Effort normal and breath sounds normal  No respiratory distress  Musculoskeletal: He exhibits no edema  Lymphadenopathy:     He has no cervical adenopathy  Skin: Skin is warm and dry  Psychiatric: He has a normal mood and affect  His behavior is normal          Patient's chronic problems that were reviewed today are stable  Meds reviewed and no changes made  Appropriate labs and imaging were ordered  Preventive measures appropriate for age and sex were reviewed with patient  Immunizations were updated as appropriate

## 2019-02-14 ENCOUNTER — TRANSCRIBE ORDERS (OUTPATIENT)
Dept: ADMINISTRATIVE | Facility: HOSPITAL | Age: 69
End: 2019-02-14

## 2019-02-14 ENCOUNTER — APPOINTMENT (OUTPATIENT)
Dept: LAB | Facility: HOSPITAL | Age: 69
End: 2019-02-14
Attending: PODIATRIST
Payer: MEDICARE

## 2019-02-14 DIAGNOSIS — E11.69 DIABETIC FOOT ULCER WITH OSTEOMYELITIS (HCC): ICD-10-CM

## 2019-02-14 DIAGNOSIS — M10.9 GOUT, UNSPECIFIED CAUSE, UNSPECIFIED CHRONICITY, UNSPECIFIED SITE: ICD-10-CM

## 2019-02-14 DIAGNOSIS — M86.9 DIABETIC FOOT ULCER WITH OSTEOMYELITIS (HCC): ICD-10-CM

## 2019-02-14 DIAGNOSIS — E11.69 DIABETIC FOOT ULCER WITH OSTEOMYELITIS (HCC): Primary | ICD-10-CM

## 2019-02-14 DIAGNOSIS — E11.621 DIABETIC FOOT ULCER WITH OSTEOMYELITIS (HCC): Primary | ICD-10-CM

## 2019-02-14 DIAGNOSIS — L97.509 DIABETIC FOOT ULCER WITH OSTEOMYELITIS (HCC): ICD-10-CM

## 2019-02-14 DIAGNOSIS — L97.509 DIABETIC FOOT ULCER WITH OSTEOMYELITIS (HCC): Primary | ICD-10-CM

## 2019-02-14 DIAGNOSIS — M86.9 DIABETIC FOOT ULCER WITH OSTEOMYELITIS (HCC): Primary | ICD-10-CM

## 2019-02-14 DIAGNOSIS — E11.621 DIABETIC FOOT ULCER WITH OSTEOMYELITIS (HCC): ICD-10-CM

## 2019-02-14 LAB
ERYTHROCYTE [DISTWIDTH] IN BLOOD BY AUTOMATED COUNT: 14.3 % (ref 11.6–15.1)
HCT VFR BLD AUTO: 43.3 % (ref 36.5–49.3)
HGB BLD-MCNC: 14 G/DL (ref 12–17)
MCH RBC QN AUTO: 31.3 PG (ref 26.8–34.3)
MCHC RBC AUTO-ENTMCNC: 32.3 G/DL (ref 31.4–37.4)
MCV RBC AUTO: 97 FL (ref 82–98)
PLATELET # BLD AUTO: 199 THOUSANDS/UL (ref 149–390)
PMV BLD AUTO: 11.1 FL (ref 8.9–12.7)
RBC # BLD AUTO: 4.48 MILLION/UL (ref 3.88–5.62)
URATE SERPL-MCNC: 3.2 MG/DL (ref 4.2–8)
WBC # BLD AUTO: 7.02 THOUSAND/UL (ref 4.31–10.16)

## 2019-02-14 PROCEDURE — 84550 ASSAY OF BLOOD/URIC ACID: CPT

## 2019-02-14 PROCEDURE — 85027 COMPLETE CBC AUTOMATED: CPT

## 2019-02-14 PROCEDURE — 36415 COLL VENOUS BLD VENIPUNCTURE: CPT

## 2019-06-04 DIAGNOSIS — I48.91 ATRIAL FIBRILLATION WITH RVR (HCC): ICD-10-CM

## 2019-06-04 RX ORDER — METOPROLOL TARTRATE 50 MG/1
TABLET, FILM COATED ORAL
Qty: 180 TABLET | Refills: 3 | Status: SHIPPED | OUTPATIENT
Start: 2019-06-04 | End: 2019-09-03 | Stop reason: ALTCHOICE

## 2019-06-04 RX ORDER — METOPROLOL TARTRATE 50 MG/1
50 TABLET, FILM COATED ORAL EVERY 12 HOURS SCHEDULED
Qty: 90 TABLET | Refills: 3 | Status: SHIPPED | OUTPATIENT
Start: 2019-06-04 | End: 2019-06-04 | Stop reason: SDUPTHER

## 2019-06-28 ENCOUNTER — TELEPHONE (OUTPATIENT)
Dept: FAMILY MEDICINE CLINIC | Facility: CLINIC | Age: 69
End: 2019-06-28

## 2019-07-23 ENCOUNTER — TELEPHONE (OUTPATIENT)
Dept: FAMILY MEDICINE CLINIC | Facility: CLINIC | Age: 69
End: 2019-07-23

## 2019-07-23 PROBLEM — E11.621 TYPE 2 DIABETES MELLITUS WITH FOOT ULCER (CODE) (HCC): Status: ACTIVE | Noted: 2019-07-23

## 2019-07-23 NOTE — TELEPHONE ENCOUNTER
Left message for patient to call back  Call is regarding upcoming appt with glp  Appointment needs to be rescheduled

## 2019-07-26 DIAGNOSIS — E11.9 TYPE 2 DIABETES MELLITUS WITHOUT COMPLICATION, WITHOUT LONG-TERM CURRENT USE OF INSULIN (HCC): ICD-10-CM

## 2019-07-26 RX ORDER — GLIMEPIRIDE 4 MG/1
TABLET ORAL
Qty: 180 TABLET | Refills: 3 | Status: SHIPPED | OUTPATIENT
Start: 2019-07-26 | End: 2020-07-31 | Stop reason: SDUPTHER

## 2019-08-13 ENCOUNTER — OFFICE VISIT (OUTPATIENT)
Dept: FAMILY MEDICINE CLINIC | Facility: CLINIC | Age: 69
End: 2019-08-13
Payer: MEDICARE

## 2019-08-13 VITALS
HEART RATE: 53 BPM | OXYGEN SATURATION: 97 % | WEIGHT: 233.4 LBS | TEMPERATURE: 98.2 F | HEIGHT: 76 IN | BODY MASS INDEX: 28.42 KG/M2 | SYSTOLIC BLOOD PRESSURE: 140 MMHG | DIASTOLIC BLOOD PRESSURE: 100 MMHG

## 2019-08-13 DIAGNOSIS — Z89.421 ACQUIRED ABSENCE OF OTHER RIGHT TOE(S) (HCC): ICD-10-CM

## 2019-08-13 DIAGNOSIS — E11.40 TYPE 2 DIABETES MELLITUS WITH DIABETIC NEUROPATHY, UNSPECIFIED WHETHER LONG TERM INSULIN USE (HCC): Chronic | ICD-10-CM

## 2019-08-13 DIAGNOSIS — R39.12 BENIGN PROSTATIC HYPERPLASIA WITH WEAK URINARY STREAM: Chronic | ICD-10-CM

## 2019-08-13 DIAGNOSIS — I48.91 ATRIAL FIBRILLATION WITH RVR (HCC): ICD-10-CM

## 2019-08-13 DIAGNOSIS — E78.5 HYPERLIPIDEMIA, UNSPECIFIED HYPERLIPIDEMIA TYPE: Primary | Chronic | ICD-10-CM

## 2019-08-13 DIAGNOSIS — Z86.73 HISTORY OF TIA (TRANSIENT ISCHEMIC ATTACK): ICD-10-CM

## 2019-08-13 DIAGNOSIS — N40.1 BENIGN PROSTATIC HYPERPLASIA WITH WEAK URINARY STREAM: Chronic | ICD-10-CM

## 2019-08-13 DIAGNOSIS — I10 ESSENTIAL HYPERTENSION: Chronic | ICD-10-CM

## 2019-08-13 PROBLEM — L89.893 PRESSURE INJURY OF RIGHT FOOT, STAGE 3 (HCC): Status: RESOLVED | Noted: 2018-11-06 | Resolved: 2019-08-13

## 2019-08-13 PROBLEM — L03.115 CELLULITIS OF RIGHT LOWER EXTREMITY: Status: RESOLVED | Noted: 2018-09-05 | Resolved: 2019-08-13

## 2019-08-13 LAB
ALBUMIN SERPL BCP-MCNC: 4.7 G/DL (ref 3.5–5)
ALP SERPL-CCNC: 77 U/L (ref 46–116)
ALT SERPL W P-5'-P-CCNC: 31 U/L (ref 12–78)
ANION GAP SERPL CALCULATED.3IONS-SCNC: 9 MMOL/L (ref 4–13)
AST SERPL W P-5'-P-CCNC: 17 U/L (ref 5–45)
BASOPHILS # BLD AUTO: 0.04 THOUSANDS/ΜL (ref 0–0.1)
BASOPHILS NFR BLD AUTO: 0 % (ref 0–1)
BILIRUB SERPL-MCNC: 0.64 MG/DL (ref 0.2–1)
BUN SERPL-MCNC: 17 MG/DL (ref 5–25)
CALCIUM SERPL-MCNC: 9.6 MG/DL (ref 8.3–10.1)
CHLORIDE SERPL-SCNC: 109 MMOL/L (ref 100–108)
CO2 SERPL-SCNC: 24 MMOL/L (ref 21–32)
CREAT SERPL-MCNC: 0.89 MG/DL (ref 0.6–1.3)
EOSINOPHIL # BLD AUTO: 0.24 THOUSAND/ΜL (ref 0–0.61)
EOSINOPHIL NFR BLD AUTO: 3 % (ref 0–6)
ERYTHROCYTE [DISTWIDTH] IN BLOOD BY AUTOMATED COUNT: 13.6 % (ref 11.6–15.1)
EST. AVERAGE GLUCOSE BLD GHB EST-MCNC: 177 MG/DL
GFR SERPL CREATININE-BSD FRML MDRD: 88 ML/MIN/1.73SQ M
GLUCOSE SERPL-MCNC: 91 MG/DL (ref 65–140)
HBA1C MFR BLD: 7.8 % (ref 4.2–6.3)
HCT VFR BLD AUTO: 45.9 % (ref 36.5–49.3)
HGB BLD-MCNC: 14.5 G/DL (ref 12–17)
IMM GRANULOCYTES # BLD AUTO: 0.03 THOUSAND/UL (ref 0–0.2)
IMM GRANULOCYTES NFR BLD AUTO: 0 % (ref 0–2)
LYMPHOCYTES # BLD AUTO: 3.02 THOUSANDS/ΜL (ref 0.6–4.47)
LYMPHOCYTES NFR BLD AUTO: 33 % (ref 14–44)
MCH RBC QN AUTO: 31 PG (ref 26.8–34.3)
MCHC RBC AUTO-ENTMCNC: 31.6 G/DL (ref 31.4–37.4)
MCV RBC AUTO: 98 FL (ref 82–98)
MONOCYTES # BLD AUTO: 0.67 THOUSAND/ΜL (ref 0.17–1.22)
MONOCYTES NFR BLD AUTO: 7 % (ref 4–12)
NEUTROPHILS # BLD AUTO: 5.28 THOUSANDS/ΜL (ref 1.85–7.62)
NEUTS SEG NFR BLD AUTO: 57 % (ref 43–75)
NRBC BLD AUTO-RTO: 0 /100 WBCS
PLATELET # BLD AUTO: 187 THOUSANDS/UL (ref 149–390)
PMV BLD AUTO: 11.9 FL (ref 8.9–12.7)
POTASSIUM SERPL-SCNC: 4.4 MMOL/L (ref 3.5–5.3)
PROT SERPL-MCNC: 7.7 G/DL (ref 6.4–8.2)
RBC # BLD AUTO: 4.67 MILLION/UL (ref 3.88–5.62)
SODIUM SERPL-SCNC: 142 MMOL/L (ref 136–145)
TSH SERPL DL<=0.05 MIU/L-ACNC: 1.35 UIU/ML (ref 0.36–3.74)
WBC # BLD AUTO: 9.28 THOUSAND/UL (ref 4.31–10.16)

## 2019-08-13 PROCEDURE — 80053 COMPREHEN METABOLIC PANEL: CPT | Performed by: FAMILY MEDICINE

## 2019-08-13 PROCEDURE — 99214 OFFICE O/P EST MOD 30 MIN: CPT | Performed by: FAMILY MEDICINE

## 2019-08-13 PROCEDURE — 83036 HEMOGLOBIN GLYCOSYLATED A1C: CPT | Performed by: FAMILY MEDICINE

## 2019-08-13 PROCEDURE — 85025 COMPLETE CBC W/AUTO DIFF WBC: CPT | Performed by: FAMILY MEDICINE

## 2019-08-13 PROCEDURE — 82043 UR ALBUMIN QUANTITATIVE: CPT | Performed by: FAMILY MEDICINE

## 2019-08-13 PROCEDURE — 36415 COLL VENOUS BLD VENIPUNCTURE: CPT | Performed by: FAMILY MEDICINE

## 2019-08-13 PROCEDURE — 84443 ASSAY THYROID STIM HORMONE: CPT | Performed by: FAMILY MEDICINE

## 2019-08-13 PROCEDURE — 82570 ASSAY OF URINE CREATININE: CPT | Performed by: FAMILY MEDICINE

## 2019-08-13 RX ORDER — DILTIAZEM HYDROCHLORIDE 120 MG/1
120 TABLET, FILM COATED ORAL EVERY 12 HOURS SCHEDULED
Qty: 60 TABLET | Refills: 12 | Status: SHIPPED | OUTPATIENT
Start: 2019-08-13 | End: 2019-12-06 | Stop reason: SDUPTHER

## 2019-08-13 NOTE — PROGRESS NOTES
Assessment/Plan:    No problem-specific Assessment & Plan notes found for this encounter  There are no diagnoses linked to this encounter  Subjective:      Patient ID: Nicole Johnson  is a 81 y o  male  PATIENT RETURNS FOR FOLLOW-UP OF CHRONIC MEDICAL CONDITIONS  NO HOSPITAL STAYS OR EMERGENCY VISITS RECENTLY  MEDS WERE REVIEWED AND NO SIDE EFFECTS  NO NEW ISSUES  UNLESS NOTED BELOW  NO NEW MEDICAL PROVIDER REPORTED  THE CHRONIC DISEASES LISTED ABOVE ARE STABLE AND UNCHANGED/ THE PLAN OF CARE FOR THOSE WILL REMAIN UNCHANGED UNLESS NOTED BELOW  ED since b blocker / tired as well /     Sees eye and foot doc and OK lately       The following portions of the patient's history were reviewed and updated as appropriate: allergies, current medications, past family history, past medical history, past social history, past surgical history and problem list     Review of Systems   Constitutional: Positive for fatigue  Negative for activity change and appetite change  HENT: Negative for trouble swallowing  Eyes: Negative for visual disturbance  Respiratory: Negative for cough and shortness of breath  Cardiovascular: Negative for chest pain, palpitations and leg swelling  Gastrointestinal: Negative for abdominal pain and blood in stool  Endocrine: Negative for polyuria  Genitourinary: Negative for difficulty urinating and hematuria  Skin: Negative for rash  Neurological: Negative for dizziness  Psychiatric/Behavioral: Negative for behavioral problems  Objective:  Vitals:    08/13/19 1448   BP: 140/100   BP Location: Left arm   Patient Position: Sitting   Cuff Size: Adult   Pulse: (!) 53   Temp: 98 2 °F (36 8 °C)   TempSrc: Temporal   SpO2: 97%   Weight: 106 kg (233 lb 6 4 oz)   Height: 6' 4" (1 93 m)      Physical Exam   Constitutional: He appears well-developed and well-nourished  HENT:   Head: Normocephalic and atraumatic     Eyes: Conjunctivae are normal    Neck: Neck supple  No thyromegaly present  Cardiovascular: Normal rate, regular rhythm, normal heart sounds and intact distal pulses  No murmur heard  Pulmonary/Chest: Effort normal and breath sounds normal  No respiratory distress  Musculoskeletal: He exhibits no edema  Lymphadenopathy:     He has no cervical adenopathy  Skin: Skin is warm and dry  Psychiatric: He has a normal mood and affect  His behavior is normal          Patient's chronic problems that were reviewed today are stable  Meds reviewed and no changes made  Appropriate labs and imaging were ordered  Preventive measures appropriate for age and sex were reviewed with patient  Immunizations were updated as appropriate        With low energy and ED : change to cardizem ;

## 2019-08-13 NOTE — PATIENT INSTRUCTIONS
Starting this weekend take 1 metoprolol in the morning and 1 diltiazem in the evening and stay with that for 1 week after that week stop metoprolol altogether and take diltiazem twice a day every day  Return in 3 weeks to assess the heart rate issues with ED and fatigue  I will review the blood work with you at that visit    When you come in next time will set up the repeat CT scan to follow up that nodule

## 2019-08-14 LAB
CREAT UR-MCNC: 157 MG/DL
MICROALBUMIN UR-MCNC: 142 MG/L (ref 0–20)
MICROALBUMIN/CREAT 24H UR: 90 MG/G CREATININE (ref 0–30)

## 2019-08-26 DIAGNOSIS — E11.40 TYPE 2 DIABETES MELLITUS WITH DIABETIC NEUROPATHY, UNSPECIFIED WHETHER LONG TERM INSULIN USE (HCC): Primary | ICD-10-CM

## 2019-08-26 RX ORDER — SITAGLIPTIN 100 MG/1
100 TABLET, FILM COATED ORAL DAILY
Qty: 90 TABLET | Refills: 3 | Status: SHIPPED | OUTPATIENT
Start: 2019-08-26 | End: 2019-12-06 | Stop reason: SDUPTHER

## 2019-09-03 ENCOUNTER — OFFICE VISIT (OUTPATIENT)
Dept: FAMILY MEDICINE CLINIC | Facility: CLINIC | Age: 69
End: 2019-09-03
Payer: MEDICARE

## 2019-09-03 VITALS
HEIGHT: 76 IN | SYSTOLIC BLOOD PRESSURE: 144 MMHG | HEART RATE: 80 BPM | WEIGHT: 233.2 LBS | DIASTOLIC BLOOD PRESSURE: 80 MMHG | TEMPERATURE: 97.7 F | BODY MASS INDEX: 28.4 KG/M2

## 2019-09-03 DIAGNOSIS — I10 ESSENTIAL HYPERTENSION: Primary | Chronic | ICD-10-CM

## 2019-09-03 DIAGNOSIS — E11.40 TYPE 2 DIABETES MELLITUS WITH DIABETIC NEUROPATHY, UNSPECIFIED WHETHER LONG TERM INSULIN USE (HCC): Chronic | ICD-10-CM

## 2019-09-03 DIAGNOSIS — I48.91 ATRIAL FIBRILLATION WITH RVR (HCC): ICD-10-CM

## 2019-09-03 PROCEDURE — 90662 IIV NO PRSV INCREASED AG IM: CPT

## 2019-09-03 PROCEDURE — G0008 ADMIN INFLUENZA VIRUS VAC: HCPCS

## 2019-09-03 PROCEDURE — 99213 OFFICE O/P EST LOW 20 MIN: CPT

## 2019-09-03 PROCEDURE — 1124F ACP DISCUSS-NO DSCNMKR DOCD: CPT

## 2019-09-03 RX ORDER — VARDENAFIL HYDROCHLORIDE 20 MG/1
20 TABLET ORAL DAILY PRN
Qty: 2 TABLET | Refills: 6 | Status: SHIPPED | OUTPATIENT
Start: 2019-09-03 | End: 2019-11-13

## 2019-09-03 NOTE — PROGRESS NOTES
Assessment/Plan:    No problem-specific Assessment & Plan notes found for this encounter  Diagnoses and all orders for this visit:    Essential hypertension    Atrial fibrillation with RVR ([address] )    Type 2 diabetes mellitus with diabetic neuropathy, unspecified whether long term insulin use (HCC)          Subjective:      Patient ID: Karina Pastor  is a 81 y o  male  Jewel Sables comes to follow-up the fatigue and ED  He is feeling a bit better with his energy since the med changes has had no palpitations or escalation of his blood pressure  Still having some issues with ED and we discussed various options  The following portions of the patient's history were reviewed and updated as appropriate: allergies, current medications, past family history, past medical history, past social history, past surgical history and problem list     Review of Systems      Objective:  Vitals:    09/03/19 1448   BP: 144/80   BP Location: Left arm   Patient Position: Sitting   Cuff Size: Adult   Pulse: 80   Temp: 97 7 °F (36 5 °C)   TempSrc: Temporal   Weight: 106 kg (233 lb 3 2 oz)   Height: 6' 4" (1 93 m)      Physical Exam   Constitutional: He is oriented to person, place, and time  He appears well-developed and well-nourished  HENT:   Head: Normocephalic and atraumatic  Eyes: Conjunctivae are normal    Cardiovascular:   Controlled AF : 80-90        Neurological: He is alert and oriented to person, place, and time  Skin: Skin is warm and dry  He is not diaphoretic  Psychiatric: He has a normal mood and affect

## 2019-09-10 ENCOUNTER — HOSPITAL ENCOUNTER (OUTPATIENT)
Dept: CT IMAGING | Facility: HOSPITAL | Age: 69
Discharge: HOME | End: 2019-09-10
Payer: MEDICARE

## 2019-09-10 DIAGNOSIS — E11.40 TYPE 2 DIABETES MELLITUS WITH DIABETIC NEUROPATHY, UNSPECIFIED WHETHER LONG TERM INSULIN USE (HCC): ICD-10-CM

## 2019-09-10 PROCEDURE — 71250 CT THORAX DX C-: CPT

## 2019-09-16 ENCOUNTER — TELEPHONE (OUTPATIENT)
Dept: FAMILY MEDICINE CLINIC | Facility: CLINIC | Age: 69
End: 2019-09-16

## 2019-09-17 ENCOUNTER — TELEPHONE (OUTPATIENT)
Dept: FAMILY MEDICINE CLINIC | Facility: CLINIC | Age: 69
End: 2019-09-17

## 2019-09-17 NOTE — TELEPHONE ENCOUNTER
Left message for patient to call back regarding ct results   Per glp the results is the following; CT scan is stable ; nothing else to do for 2 yrs then repeat the study

## 2019-09-25 DIAGNOSIS — E11.9 TYPE 2 DIABETES MELLITUS WITHOUT COMPLICATION, UNSPECIFIED WHETHER LONG TERM INSULIN USE (HCC): ICD-10-CM

## 2019-09-25 RX ORDER — METFORMIN HYDROCHLORIDE 750 MG/1
750 TABLET, EXTENDED RELEASE ORAL 2 TIMES DAILY
Qty: 180 TABLET | Refills: 3 | Status: SHIPPED | OUTPATIENT
Start: 2019-09-25 | End: 2019-12-06 | Stop reason: SDUPTHER

## 2019-10-25 ENCOUNTER — OFFICE VISIT (OUTPATIENT)
Dept: URGENT CARE | Age: 69
End: 2019-10-25
Payer: MEDICARE

## 2019-10-25 VITALS
SYSTOLIC BLOOD PRESSURE: 166 MMHG | RESPIRATION RATE: 18 BRPM | HEART RATE: 104 BPM | DIASTOLIC BLOOD PRESSURE: 77 MMHG | TEMPERATURE: 98.7 F | BODY MASS INDEX: 28.08 KG/M2 | OXYGEN SATURATION: 96 % | WEIGHT: 230.6 LBS | HEIGHT: 76 IN

## 2019-10-25 DIAGNOSIS — N39.0 URINARY TRACT INFECTION WITHOUT HEMATURIA, SITE UNSPECIFIED: Primary | ICD-10-CM

## 2019-10-25 LAB
SL AMB  POCT GLUCOSE, UA: ABNORMAL
SL AMB LEUKOCYTE ESTERASE,UA: ABNORMAL
SL AMB POCT BILIRUBIN,UA: NEGATIVE
SL AMB POCT BLOOD,UA: ABNORMAL
SL AMB POCT CLARITY,UA: ABNORMAL
SL AMB POCT COLOR,UA: YELLOW
SL AMB POCT KETONES,UA: 5
SL AMB POCT NITRITE,UA: POSITIVE
SL AMB POCT PH,UA: 5
SL AMB POCT SPECIFIC GRAVITY,UA: 1.02
SL AMB POCT URINE PROTEIN: 30
SL AMB POCT UROBILINOGEN: 0.2

## 2019-10-25 PROCEDURE — 81002 URINALYSIS NONAUTO W/O SCOPE: CPT | Performed by: PHYSICIAN ASSISTANT

## 2019-10-25 PROCEDURE — 99213 OFFICE O/P EST LOW 20 MIN: CPT | Performed by: PHYSICIAN ASSISTANT

## 2019-10-25 PROCEDURE — G0463 HOSPITAL OUTPT CLINIC VISIT: HCPCS | Performed by: PHYSICIAN ASSISTANT

## 2019-10-25 PROCEDURE — 87186 SC STD MICRODIL/AGAR DIL: CPT | Performed by: PHYSICIAN ASSISTANT

## 2019-10-25 PROCEDURE — 87077 CULTURE AEROBIC IDENTIFY: CPT | Performed by: PHYSICIAN ASSISTANT

## 2019-10-25 PROCEDURE — 87086 URINE CULTURE/COLONY COUNT: CPT | Performed by: PHYSICIAN ASSISTANT

## 2019-10-25 RX ORDER — LEVOFLOXACIN 750 MG/1
750 TABLET ORAL EVERY 24 HOURS
Qty: 7 TABLET | Refills: 0 | Status: SHIPPED | OUTPATIENT
Start: 2019-10-25 | End: 2019-11-01

## 2019-10-25 NOTE — PROGRESS NOTES
[facility]        NAME: Laura Banda  is a 81 y o  male  : 1950    MRN: [id number]  DATE: 2019  TIME: 3:14 PM    /77 (BP Location: Left arm, Patient Position: Sitting, Cuff Size: Adult)   Pulse 104   Temp 98 7 °F (37 1 °C) (Tympanic)   Resp 18   Ht 6' 4" (1 93 m)   Wt 105 kg (230 lb 9 6 oz)   SpO2 96%   BMI 28 07 kg/m²     Assessment and Plan   Urinary tract infection without hematuria, site unspecified [N39 0]  1  Urinary tract infection without hematuria, site unspecified  POCT urine dip    Urine culture    Ambulatory referral to Urology    levofloxacin (LEVAQUIN) 750 mg tablet         Patient Instructions       Follow up with PCP in 3-5 days  Proceed to  ER if symptoms worsen  Chief Complaint     Chief Complaint   Patient presents with    Urinary Frequency     x2 wks frequency, odor in urine, pt states is thicker  painful urination x4 days  denies fevers, abdominal pain, or back pain  History of Present Illness       Pt with intermittent difficulty with urine x 1 month   Pt admit to foul odor with urine    Difficulty Urinating    This is a new problem  The current episode started more than 1 month ago  The problem occurs intermittently  The problem has been unchanged  The quality of the pain is described as aching  The pain is at a severity of 5/10  The pain is mild  There has been no fever  He is not sexually active  There is no history of pyelonephritis  Pertinent negatives include no chills, discharge, flank pain, frequency, hematuria, hesitancy, nausea, possible pregnancy, sweats, urgency or vomiting  He has tried nothing for the symptoms  The treatment provided no relief  There is no history of catheterization, kidney stones, recurrent UTIs, a single kidney, urinary stasis or a urological procedure  Review of Systems   Review of Systems   Constitutional: Negative  Negative for chills  HENT: Negative  Eyes: Negative  Respiratory: Negative  Cardiovascular: Negative  Gastrointestinal: Negative  Negative for nausea and vomiting  Endocrine: Negative  Genitourinary: Positive for dysuria  Negative for flank pain, frequency, hematuria, hesitancy and urgency  Musculoskeletal: Negative  Allergic/Immunologic: Negative  Neurological: Negative  Hematological: Negative  Psychiatric/Behavioral: Negative  All other systems reviewed and are negative          Current Medications       Current Outpatient Medications:     Acetaminophen (TYLENOL ARTHRITIS PAIN PO), Take 650 mg by mouth 2 (two) times a day, Disp: , Rfl:     acyclovir (ZOVIRAX) 5 % ointment, every 3 (three) hours, Disp: , Rfl:     amLODIPine (NORVASC) 5 mg tablet, Take 1 tablet (5 mg total) by mouth daily, Disp: 90 tablet, Rfl: 3    apixaban (ELIQUIS) 5 mg, Take 1 tablet (5 mg total) by mouth 2 (two) times a day, Disp: 60 tablet, Rfl: 12    atorvastatin (LIPITOR) 40 mg tablet, Take 1 tablet (40 mg total) by mouth daily with dinner, Disp: 90 tablet, Rfl: 3    Blood Glucose Monitoring Suppl (ACCU-CHEK JYOTI SMARTVIEW) w/Device KIT, use to check Blood Sugar as needed, Disp: , Rfl:     diltiazem (CARDIZEM) 120 MG tablet, Take 1 tablet (120 mg total) by mouth every 12 (twelve) hours, Disp: 60 tablet, Rfl: 12    glimepiride (AMARYL) 4 mg tablet, Take one tablet by mouth twice daily, Disp: 180 tablet, Rfl: 3    glucose blood (ACCU-CHEK SMARTVIEW) test strip, Use as instructed BID, Disp: 200 each, Rfl: 12    JANUVIA 100 MG tablet, Take 1 tablet (100 mg total) by mouth daily, Disp: 90 tablet, Rfl: 3    lisinopril (ZESTRIL) 40 mg tablet, Take 1 tablet (40 mg total) by mouth daily, Disp: 90 tablet, Rfl: 3    metFORMIN (GLUCOPHAGE-XR) 750 mg 24 hr tablet, Take 1 tablet (750 mg total) by mouth 2 (two) times a day ONE BID, Disp: 180 tablet, Rfl: 3    levofloxacin (LEVAQUIN) 750 mg tablet, Take 1 tablet (750 mg total) by mouth every 24 hours for 7 days, Disp: 7 tablet, Rfl: 0   Misc Natural Products (GLUCOSAMINE CHOND DOUBLE STR PO), Take by mouth 2 (two) times a day  , Disp: , Rfl:     vardenafil (LEVITRA) 20 MG tablet, Take 1 tablet (20 mg total) by mouth daily as needed for erectile dysfunction (Patient not taking: Reported on 10/25/2019), Disp: 10 tablet, Rfl: 0    vardenafil (LEVITRA) 20 MG tablet, Take 1 tablet (20 mg total) by mouth daily as needed for erectile dysfunction (Patient not taking: Reported on 10/25/2019), Disp: 2 tablet, Rfl: 6    Current Allergies     Allergies as of 10/25/2019 - Reviewed 10/25/2019   Allergen Reaction Noted    Simvastatin Myalgia 06/26/2018    Itraconazole  01/13/2011            The following portions of the patient's history were reviewed and updated as appropriate: allergies, current medications, past family history, past medical history, past social history, past surgical history and problem list      Past Medical History:   Diagnosis Date    Arthritis     Atrial fibrillation ([address] )     Diagnosed 11/2018    Benign prostate hyperplasia 04/2002    Cellulitis     right lower leg     Colon polyp 2006    Diabetes mellitus ([address] )     Hearing aid worn     bilat    Hearing loss     90% loss left ear and 40% right ear    History of clubfoot     "since birth"    History of pneumonia     History of TIA (transient ischemic attack) 04/25/2017 11/30/18 pt denies    Hyperlipidemia 5/15/2014    Hypertension     Infectious viral hepatitis     "cant remember type"    Irregular heart beat     Neuropathy     both feet    Osteomyelitis ([address] )     right great toe    Right middle lobe pulmonary nodule 11/6/2018    Seasickness     Teeth missing     Type 2 diabetes mellitus with diabetic neuropathy ([address] ) 11/6/2018    Wears glasses     reading    Wound, open     bottom of right foot       Past Surgical History:   Procedure Laterality Date    BUNIONECTOMY Right 12/4/2018    Procedure: 5TH METATARSAL BONE PARTIAL RESECTION, FULL THICKNESS DEBRIDEMENT OF DIABETIC ULCER;  Surgeon: Ella Lazo DPM;  Location: [facility];  Service: Podiatry    CLUB FOOT RELEASE Bilateral     COLONOSCOPY      TOE AMPUTATION Right     partial great toe    VASECTOMY  1992       Family History   Problem Relation Age of Onset    Diabetes Father         mellitus         Medications have been verified  Objective   /77 (BP Location: Left arm, Patient Position: Sitting, Cuff Size: Adult)   Pulse 104   Temp 98 7 °F (37 1 °C) (Tympanic)   Resp 18   Ht 6' 4" (1 93 m)   Wt 105 kg (230 lb 9 6 oz)   SpO2 96%   BMI 28 07 kg/m²        Physical Exam     Physical Exam   Constitutional: He is oriented to person, place, and time  He appears well-developed and well-nourished  Discussed with pt about urology eval  Pt and wife would like that   HENT:   Head: Normocephalic  Right Ear: External ear normal    Left Ear: External ear normal    Nose: Nose normal    Mouth/Throat: Oropharynx is clear and moist    Eyes: Pupils are equal, round, and reactive to light  Conjunctivae and EOM are normal    Neck: Normal range of motion  Neck supple  Cardiovascular: Normal rate, regular rhythm, normal heart sounds and intact distal pulses  Pulmonary/Chest: Effort normal and breath sounds normal    Abdominal: Soft  Bowel sounds are normal    Musculoskeletal: Normal range of motion  Neurological: He is alert and oriented to person, place, and time  Skin: Skin is warm  Psychiatric: He has a normal mood and affect  Nursing note and vitals reviewed

## 2019-10-25 NOTE — PATIENT INSTRUCTIONS
Urinary Traction Infection in Older Adults   WHAT YOU NEED TO KNOW:   A urinary tract infection (UTI) is caused by bacteria that get inside your urinary tract  Your urinary tract includes your kidneys, ureters, bladder, and urethra  Urine is made in your kidneys, and it flows from the ureters to the bladder  Urine leaves the bladder through the urethra  A UTI is more common in your lower urinary tract, which includes your bladder and urethra  DISCHARGE INSTRUCTIONS:   Return to the emergency department if:   · You are urinating very little or not at all  · You are vomiting  · You have a high fever with shaking chills  · You have side or back pain that gets worse  Contact your healthcare provider if:   · You have a fever  · You are a woman and you have increased white or yellow discharge from your vagina  · You do not feel better after 2 days of taking antibiotics  · You have questions or concerns about your condition or care  Medicines:   · Medicines  help treat the bacterial infection or decrease pain and burning when you urinate  You may also need medicines to decrease the urge to urinate often  Your healthcare provider may recommend cranberry juice or cranberry supplements to help decrease your symptoms  · Take your medicine as directed  Contact your healthcare provider if you think your medicine is not helping or if you have side effects  Tell him or her if you are allergic to any medicine  Keep a list of the medicines, vitamins, and herbs you take  Include the amounts, and when and why you take them  Bring the list or the pill bottles to follow-up visits  Carry your medicine list with you in case of an emergency  Self-care:   · Urinate when you feel the urge  Do not hold your urine because bacteria can grow in the bladder if urine stays in the bladder too long  It may be helpful to urinate at least every 3 to 4 hours  · Drink liquids as directed    Liquids can help flush bacteria from your urinary tract  Ask how much liquid to drink each day and which liquids are best for you  You may need to drink more liquids than usual to help flush out the bacteria  Do not drink alcohol, caffeine, and citrus juices  These can irritate your bladder and increase your symptoms  · Apply heat  on your abdomen for 20 to 30 minutes every 2 hours for as many days as directed  Heat helps decrease discomfort and pressure in your bladder  Prevent a UTI:   · Women should wipe front to back  after urinating or having a bowel movement  This may prevent germs from getting into the urinary tract  · Urinate after you have sex  to flush away bacteria that can enter your urinary tract during sex  · Wear cotton underwear and clothes that fit loose  Tight pants and nylon underwear can trap moisture and cause bacteria to grow  Follow up with your healthcare provider as directed:  Write down your questions so you remember to ask them during your visits  © 2017 [employer] Information is for End User's use only and may not be sold, redistributed or otherwise used for commercial purposes  All illustrations and images included in CareNotes® are the copyrighted property of A D [employer] M [employer] Inc  or [employer] In[employer]  The above information is an  only  It is not intended as medical advice for individual conditions or treatments  Talk to your doctor, nurse or pharmacist before following any medical regimen to see if it is safe and effective for you

## 2019-10-27 LAB — BACTERIA UR CULT: ABNORMAL

## 2019-11-13 ENCOUNTER — OFFICE VISIT (OUTPATIENT)
Dept: UROLOGY | Facility: MEDICAL CENTER | Age: 69
End: 2019-11-13
Payer: MEDICARE

## 2019-11-13 VITALS
SYSTOLIC BLOOD PRESSURE: 140 MMHG | DIASTOLIC BLOOD PRESSURE: 78 MMHG | BODY MASS INDEX: 28.49 KG/M2 | HEIGHT: 76 IN | WEIGHT: 234 LBS | HEART RATE: 87 BPM

## 2019-11-13 DIAGNOSIS — N40.1 BPH WITH OBSTRUCTION/LOWER URINARY TRACT SYMPTOMS: Primary | ICD-10-CM

## 2019-11-13 DIAGNOSIS — Z87.440 HISTORY OF ACUTE CYSTITIS: ICD-10-CM

## 2019-11-13 DIAGNOSIS — N52.1 ERECTILE DYSFUNCTION ASSOCIATED WITH TYPE 2 DIABETES MELLITUS (HCC): ICD-10-CM

## 2019-11-13 DIAGNOSIS — N13.8 BPH WITH OBSTRUCTION/LOWER URINARY TRACT SYMPTOMS: Primary | ICD-10-CM

## 2019-11-13 DIAGNOSIS — E11.69 ERECTILE DYSFUNCTION ASSOCIATED WITH TYPE 2 DIABETES MELLITUS (HCC): ICD-10-CM

## 2019-11-13 LAB
SL AMB  POCT GLUCOSE, UA: NORMAL
SL AMB LEUKOCYTE ESTERASE,UA: NORMAL
SL AMB POCT BILIRUBIN,UA: NORMAL
SL AMB POCT BLOOD,UA: NORMAL
SL AMB POCT CLARITY,UA: CLEAR
SL AMB POCT COLOR,UA: YELLOW
SL AMB POCT KETONES,UA: NORMAL
SL AMB POCT NITRITE,UA: NORMAL
SL AMB POCT PH,UA: 5.5
SL AMB POCT SPECIFIC GRAVITY,UA: 1.02
SL AMB POCT URINE PROTEIN: NORMAL
SL AMB POCT UROBILINOGEN: 0.2

## 2019-11-13 PROCEDURE — 87086 URINE CULTURE/COLONY COUNT: CPT | Performed by: UROLOGY

## 2019-11-13 PROCEDURE — 99204 OFFICE O/P NEW MOD 45 MIN: CPT | Performed by: UROLOGY

## 2019-11-13 PROCEDURE — 81003 URINALYSIS AUTO W/O SCOPE: CPT | Performed by: UROLOGY

## 2019-11-13 NOTE — PROGRESS NOTES
Assessment/Plan:  1  History of acute cystitis-the patient had urinary tenesmus foul-smelling urine and responded antibiotic therapy  Currently the patient does not have any dysuria and his urine is clear  He is unsure as to whether he had some gross hematuria when he 1st was infected and therefore rather than checking the upper urinary tracts with renal ultrasound CT renal protocol will be ordered  Ultimately PVR and uroflow will be checked along with cystourethroscopy  Repeat urine culture will be ordered  2  BPH with lower urinary tract symptoms-current AUA symptom score 11 with some weakening of the urinary stream urgency frequency and nocturia twice nightly with the patient noting that lately his urinary stream has started a weakened  The patient and I will discuss this and consider either alpha blockade or Uro lift although he is on chronic anticoagulation and Uro lift may need to be avoided at this time  3  Erectile dysfunction associated with type 2 diabetes mellitus-patient will be given a trial of Viagra 100 mg and will undergo testosterone panel  No problem-specific Assessment & Plan notes found for this encounter  Diagnoses and all orders for this visit:    BPH with obstruction/lower urinary tract symptoms  -     US transrectal; Future  -     Cystoscopy; Future  -     Uroflow w/ post void residual; Future  -     PSA Total, Diagnostic; Future    History of acute cystitis  -     Ambulatory referral to Urology  -     POCT urine dip auto non-scope  -     CT renal protocol; Future  -     Cystoscopy; Future  -     Uroflow w/ post void residual; Future  -     Urine culture; Future  -     Urine culture    Erectile dysfunction associated with type 2 diabetes mellitus ([address] )  -     Comprehensive metabolic panel; Future  -     Testosterone, free, total; Future          Subjective:      Patient ID: Kim Ac  is a 81 y o  male      HPI  60-year-old male noted that approximately 3-4 weeks ago he developed urinary frequency urgency and foul-smelling urine and was treated for acute cystitis successfully  Prior to that he noted increasing difficulties with urinary flow urinary urgency and frequency with increasing nocturia  The patient also notes increasing difficulties with erectile dysfunction noting difficulty obtaining and maintaining an erection  The patient presents for evaluation and treatment  At the time of the onset of his urinary infection symptoms he is not sure whether he had blood-tinged urine  The following portions of the patient's history were reviewed and updated as appropriate: allergies, current medications, past family history, past medical history, past social history, past surgical history and problem list     Review of Systems   Genitourinary:        See HPI and AUA symptom score    History of bilateral vasectomy   All other systems reviewed and are negative  Objective:      /78 (BP Location: Left arm, Patient Position: Sitting, Cuff Size: Adult)   Pulse 87   Ht 6' 4" (1 93 m)   Wt 106 kg (234 lb)   BMI 28 48 kg/m²          Physical Exam   Constitutional: He is oriented to person, place, and time  He appears well-developed and well-nourished  HENT:   Head: Normocephalic and atraumatic  Eyes: EOM are normal    Neck: Neck supple  Pulmonary/Chest: Effort normal  No respiratory distress  Abdominal: Soft  He exhibits no distension  Genitourinary:   Genitourinary Comments: Phallus normal circumcised without cutaneous lesions  Meatus patent normally placed  Scrotum normal without cutaneous lesions  Testes bilaterally descended without palpable masses or adnexal abnormality except for a right-sided spermatocele  WENDY normal anal verge normal anal sphincter tone no palpable rectal masses 40 g a nodular nontender prostate   Neurological: He is alert and oriented to person, place, and time  Psychiatric: He has a normal mood and affect   His behavior is normal  Judgment and thought content normal    Vitals reviewed

## 2019-11-14 LAB — BACTERIA UR CULT: NORMAL

## 2019-11-16 ENCOUNTER — APPOINTMENT (OUTPATIENT)
Dept: LAB | Facility: HOSPITAL | Age: 69
End: 2019-11-16
Attending: UROLOGY
Payer: MEDICARE

## 2019-11-16 DIAGNOSIS — N40.1 BPH WITH OBSTRUCTION/LOWER URINARY TRACT SYMPTOMS: ICD-10-CM

## 2019-11-16 DIAGNOSIS — N52.1 ERECTILE DYSFUNCTION ASSOCIATED WITH TYPE 2 DIABETES MELLITUS (HCC): ICD-10-CM

## 2019-11-16 DIAGNOSIS — N13.8 BPH WITH OBSTRUCTION/LOWER URINARY TRACT SYMPTOMS: ICD-10-CM

## 2019-11-16 DIAGNOSIS — E11.69 ERECTILE DYSFUNCTION ASSOCIATED WITH TYPE 2 DIABETES MELLITUS (HCC): ICD-10-CM

## 2019-11-16 LAB
ALBUMIN SERPL BCP-MCNC: 4.2 G/DL (ref 3.5–5)
ALP SERPL-CCNC: 83 U/L (ref 46–116)
ALT SERPL W P-5'-P-CCNC: 30 U/L (ref 12–78)
ANION GAP SERPL CALCULATED.3IONS-SCNC: 9 MMOL/L (ref 4–13)
AST SERPL W P-5'-P-CCNC: 14 U/L (ref 5–45)
BILIRUB SERPL-MCNC: 0.53 MG/DL (ref 0.2–1)
BUN SERPL-MCNC: 21 MG/DL (ref 5–25)
CALCIUM SERPL-MCNC: 9.5 MG/DL (ref 8.3–10.1)
CHLORIDE SERPL-SCNC: 104 MMOL/L (ref 100–108)
CO2 SERPL-SCNC: 26 MMOL/L (ref 21–32)
CREAT SERPL-MCNC: 1.04 MG/DL (ref 0.6–1.3)
GFR SERPL CREATININE-BSD FRML MDRD: 74 ML/MIN/1.73SQ M
GLUCOSE P FAST SERPL-MCNC: 214 MG/DL (ref 65–99)
POTASSIUM SERPL-SCNC: 4.6 MMOL/L (ref 3.5–5.3)
PROT SERPL-MCNC: 7.5 G/DL (ref 6.4–8.2)
PSA SERPL-MCNC: 2 NG/ML (ref 0–4)
SODIUM SERPL-SCNC: 139 MMOL/L (ref 136–145)

## 2019-11-16 PROCEDURE — 80053 COMPREHEN METABOLIC PANEL: CPT

## 2019-11-16 PROCEDURE — 84403 ASSAY OF TOTAL TESTOSTERONE: CPT

## 2019-11-16 PROCEDURE — 36415 COLL VENOUS BLD VENIPUNCTURE: CPT

## 2019-11-16 PROCEDURE — 84402 ASSAY OF FREE TESTOSTERONE: CPT

## 2019-11-16 PROCEDURE — 84153 ASSAY OF PSA TOTAL: CPT | Performed by: FAMILY MEDICINE

## 2019-11-18 LAB
TESTOST FREE SERPL-MCNC: 11.2 PG/ML (ref 6.6–18.1)
TESTOST SERPL-MCNC: 381 NG/DL (ref 264–916)

## 2019-11-20 ENCOUNTER — HOSPITAL ENCOUNTER (OUTPATIENT)
Dept: CT IMAGING | Facility: HOSPITAL | Age: 69
Discharge: HOME | End: 2019-11-20
Attending: UROLOGY
Payer: MEDICARE

## 2019-11-20 DIAGNOSIS — Z87.440 HISTORY OF ACUTE CYSTITIS: ICD-10-CM

## 2019-11-20 PROCEDURE — 74178 CT ABD&PLV WO CNTR FLWD CNTR: CPT

## 2019-11-20 RX ADMIN — IOHEXOL 100 ML: 350 INJECTION, SOLUTION INTRAVENOUS at 18:10

## 2019-12-06 DIAGNOSIS — I10 ESSENTIAL HYPERTENSION: ICD-10-CM

## 2019-12-06 DIAGNOSIS — I48.91 ATRIAL FIBRILLATION, UNSPECIFIED TYPE (HCC): ICD-10-CM

## 2019-12-06 DIAGNOSIS — E11.40 TYPE 2 DIABETES MELLITUS WITH DIABETIC NEUROPATHY, UNSPECIFIED WHETHER LONG TERM INSULIN USE (HCC): Chronic | ICD-10-CM

## 2019-12-06 DIAGNOSIS — I48.91 ATRIAL FIBRILLATION WITH RVR (HCC): ICD-10-CM

## 2019-12-06 DIAGNOSIS — E78.5 HYPERLIPIDEMIA, UNSPECIFIED HYPERLIPIDEMIA TYPE: Chronic | ICD-10-CM

## 2019-12-06 DIAGNOSIS — E11.9 TYPE 2 DIABETES MELLITUS WITHOUT COMPLICATION, UNSPECIFIED WHETHER LONG TERM INSULIN USE (HCC): ICD-10-CM

## 2019-12-06 RX ORDER — AMLODIPINE BESYLATE 5 MG/1
5 TABLET ORAL DAILY
Qty: 90 TABLET | Refills: 3 | Status: SHIPPED | OUTPATIENT
Start: 2019-12-06 | End: 2020-12-16

## 2019-12-06 RX ORDER — SITAGLIPTIN 100 MG/1
100 TABLET, FILM COATED ORAL DAILY
Qty: 90 TABLET | Refills: 3 | Status: SHIPPED | OUTPATIENT
Start: 2019-12-06 | End: 2020-09-02 | Stop reason: SDUPTHER

## 2019-12-06 RX ORDER — LISINOPRIL 40 MG/1
40 TABLET ORAL DAILY
Qty: 90 TABLET | Refills: 3 | Status: SHIPPED | OUTPATIENT
Start: 2019-12-06 | End: 2020-12-16 | Stop reason: SDUPTHER

## 2019-12-06 RX ORDER — METFORMIN HYDROCHLORIDE 750 MG/1
750 TABLET, EXTENDED RELEASE ORAL 2 TIMES DAILY
Qty: 180 TABLET | Refills: 3 | Status: SHIPPED | OUTPATIENT
Start: 2019-12-06 | End: 2020-12-16 | Stop reason: SDUPTHER

## 2019-12-06 RX ORDER — DILTIAZEM HYDROCHLORIDE 120 MG/1
120 TABLET, FILM COATED ORAL EVERY 12 HOURS SCHEDULED
Qty: 60 TABLET | Refills: 12 | Status: SHIPPED | OUTPATIENT
Start: 2019-12-06 | End: 2020-12-16

## 2019-12-06 RX ORDER — ATORVASTATIN CALCIUM 40 MG/1
40 TABLET, FILM COATED ORAL
Qty: 90 TABLET | Refills: 3 | Status: SHIPPED | OUTPATIENT
Start: 2019-12-06 | End: 2020-12-16

## 2020-01-03 ENCOUNTER — OFFICE VISIT (OUTPATIENT)
Dept: FAMILY MEDICINE CLINIC | Facility: CLINIC | Age: 70
End: 2020-01-03
Payer: MEDICARE

## 2020-01-03 VITALS
WEIGHT: 234.2 LBS | OXYGEN SATURATION: 97 % | HEART RATE: 68 BPM | TEMPERATURE: 98.6 F | HEIGHT: 76 IN | SYSTOLIC BLOOD PRESSURE: 160 MMHG | BODY MASS INDEX: 28.52 KG/M2 | DIASTOLIC BLOOD PRESSURE: 80 MMHG

## 2020-01-03 DIAGNOSIS — E78.5 HYPERLIPIDEMIA, UNSPECIFIED HYPERLIPIDEMIA TYPE: Chronic | ICD-10-CM

## 2020-01-03 DIAGNOSIS — I48.91 ATRIAL FIBRILLATION WITH RVR (HCC): ICD-10-CM

## 2020-01-03 DIAGNOSIS — E11.40 TYPE 2 DIABETES MELLITUS WITH DIABETIC NEUROPATHY, UNSPECIFIED WHETHER LONG TERM INSULIN USE (HCC): Primary | Chronic | ICD-10-CM

## 2020-01-03 DIAGNOSIS — I10 ESSENTIAL HYPERTENSION: Chronic | ICD-10-CM

## 2020-01-03 LAB — SL AMB POCT HEMOGLOBIN AIC: 7.7 (ref ?–6.5)

## 2020-01-03 PROCEDURE — G0439 PPPS, SUBSEQ VISIT: HCPCS | Performed by: FAMILY MEDICINE

## 2020-01-03 PROCEDURE — 83036 HEMOGLOBIN GLYCOSYLATED A1C: CPT | Performed by: FAMILY MEDICINE

## 2020-01-03 PROCEDURE — 1123F ACP DISCUSS/DSCN MKR DOCD: CPT | Performed by: FAMILY MEDICINE

## 2020-01-03 PROCEDURE — 99214 OFFICE O/P EST MOD 30 MIN: CPT | Performed by: FAMILY MEDICINE

## 2020-01-03 NOTE — PROGRESS NOTES
BMI Counseling: Body mass index is 28 51 kg/m²  The BMI is above normal  Nutrition recommendations include reducing portion sizes, decreasing overall calorie intake, 3-5 servings of fruits/vegetables daily, reducing fast food intake, consuming healthier snacks, decreasing soda and/or juice intake, moderation in carbohydrate intake, increasing intake of lean protein, reducing intake of saturated fat and trans fat and reducing intake of cholesterol  Exercise recommendations include exercising 3-5 times per week  Assessment and Plan:     Problem List Items Addressed This Visit     None           Preventive health issues were discussed with patient, and age appropriate screening tests were ordered as noted in patient's After Visit Summary  Personalized health advice and appropriate referrals for health education or preventive services given if needed, as noted in patient's After Visit Summary       History of Present Illness:     Patient presents for Medicare Annual Wellness visit    Patient Care Team:  Camille Osuna MD as PCP - General (Family Medicine)     Problem List:     Patient Active Problem List   Diagnosis    Hyperlipidemia    History of TIA (transient ischemic attack)    Type 2 diabetes mellitus with diabetic neuropathy [facility] [address] [facility]    Atrial fibrillation with RVR ([facility] [address] [facility]    Right middle lobe pulmonary nodule    Type 2 diabetes mellitus with foot ulcer (CODE) ([address] )    Essential hypertension    Acquired absence of other right toe(s) ([address] )    Benign prostatic hyperplasia with lower urinary tract symptoms      Past Medical and Surgical History:     Past Medical History:   Diagnosis Date    Arthritis     Atrial fibrillation ([address] )     Diagnosed 11/2018    Benign prostate hyperplasia 04/2002    Cellulitis     right lower leg     Colon polyp 2006    Diabetes mellitus ([address] )     Hearing aid worn     bilat    Hearing loss     90% loss left ear and 40% right ear    History of clubfoot     "since birth"   Dylon Hilario History of pneumonia     History of TIA (transient ischemic attack) 2017 pt denies    Hyperlipidemia 5/15/2014    Hypertension     Infectious viral hepatitis     "cant remember type"    Irregular heart beat     Neuropathy     both feet    Osteomyelitis [facility] [address] )     right great toe    Right middle lobe pulmonary nodule 2018    Seasickness     Teeth missing     Type 2 diabetes mellitus with diabetic neuropathy ([facility] [address] ) 2018    Wears glasses     reading    Wound, open     bottom of right foot     Past Surgical History:   Procedure Laterality Date    BUNIONECTOMY Right 2018    Procedure: 5TH METATARSAL BONE PARTIAL RESECTION, FULL THICKNESS DEBRIDEMENT OF DIABETIC ULCER;  Surgeon: Diego Doherty DPM;  Location: [facility];  Service: Podiatry    CLUB FOOT RELEASE Bilateral     COLONOSCOPY      TOE AMPUTATION Right     partial great toe    VASECTOMY        Family History:     Family History   Problem Relation Age of Onset    Diabetes Father         mellitus      Social History:     Social History     Socioeconomic History    Marital status: /Civil Union     Spouse name: None    Number of children: None    Years of education: None    Highest education level: None   Occupational History    None   Social Needs    Financial resource strain: None    Food insecurity:     Worry: None     Inability: None    Transportation needs:     Medical: None     Non-medical: None   Tobacco Use    Smoking status: Former Smoker     Types: Cigarettes     Last attempt to quit: 1988     Years since quittin 1    Smokeless tobacco: Former User   Substance and Sexual Activity    Alcohol use: Yes     Frequency: 2-4 times a month     Comment: socially    Drug use: No    Sexual activity: None   Lifestyle    Physical activity:     Days per week: None     Minutes per session: None    Stress: None   Relationships    Social connections:     Talks on phone: None     Gets together: None     Attends Confucianism service: None     Active member of club or organization: None     Attends meetings of clubs or organizations: None     Relationship status: None    Intimate partner violence:     Fear of current or ex partner: None     Emotionally abused: None     Physically abused: None     Forced sexual activity: None   Other Topics Concern    None   Social History Narrative    None       Medications and Allergies:     Current Outpatient Medications   Medication Sig Dispense Refill    Acetaminophen (TYLENOL ARTHRITIS PAIN PO) Take 650 mg by mouth 2 (two) times a day      acyclovir (ZOVIRAX) 5 % ointment every 3 (three) hours      amLODIPine (NORVASC) 5 mg tablet Take 1 tablet (5 mg total) by mouth daily 90 tablet 3    apixaban (ELIQUIS) 5 mg Take 1 tablet (5 mg total) by mouth 2 (two) times a day 60 tablet 12    atorvastatin (LIPITOR) 40 mg tablet Take 1 tablet (40 mg total) by mouth daily with dinner 90 tablet 3    Blood Glucose Monitoring Suppl (ACCU-CHEK JYOTI SMARTVIEW) w/Device KIT use to check Blood Sugar as needed      diltiazem (CARDIZEM) 120 MG tablet Take 1 tablet (120 mg total) by mouth every 12 (twelve) hours 60 tablet 12    glimepiride (AMARYL) 4 mg tablet Take one tablet by mouth twice daily 180 tablet 3    glucose blood (ACCU-CHEK SMARTVIEW) test strip Use as instructed  each 12    JANUVIA 100 MG tablet Take 1 tablet (100 mg total) by mouth daily 90 tablet 3    lisinopril (ZESTRIL) 40 mg tablet Take 1 tablet (40 mg total) by mouth daily 90 tablet 3    metFORMIN (GLUCOPHAGE-XR) 750 mg 24 hr tablet Take 1 tablet (750 mg total) by mouth 2 (two) times a day ONE  tablet 3    Misc Natural Products (GLUCOSAMINE CHOND DOUBLE STR PO) Take by mouth 2 (two) times a day         No current facility-administered medications for this visit        Allergies   Allergen Reactions    Simvastatin Myalgia    Itraconazole       Immunizations:     Immunization History Administered Date(s) Administered    INFLUENZA 12/04/2014, 09/30/2016, 09/30/2016, 11/19/2018    Influenza Split 01/14/2011, 12/20/2011, 11/26/2013    Influenza TIV (IM) 09/11/2009, 12/04/2014    Influenza, high dose seasonal 0 5 mL 09/05/2018, 09/03/2019    Pneumococcal Conjugate 13-Valent 10/24/2017, 11/17/2017    Pneumococcal Polysaccharide PPV23 03/06/2007, 01/04/2019    Zoster 09/18/2015      Health Maintenance:         Topic Date Due    CRC Screening: Colonoscopy  12/07/2021    Hepatitis C Screening  Completed         Topic Date Due    DTaP,Tdap,and Td Vaccines (1 - Tdap) 03/17/1963    Hepatitis B Vaccine (1 of 3 - Risk 3-dose series) 03/17/1971      Medicare Health Risk Assessment:     Ht 6' 4" (1 93 m)   Wt 106 kg (234 lb 3 2 oz)   BMI 28 51 kg/m²      Ryder Morning is here for his Subsequent Wellness visit  Last Medicare Wellness visit information reviewed, patient interviewed and updates made to the record today  Health Risk Assessment:   Patient rates overall health as good  Patient feels that their physical health rating is slightly worse  Eyesight was rated as slightly worse  Hearing was rated as slightly worse  Patient feels that their emotional and mental health rating is same  Pain experienced in the last 7 days has been none  Patient states that he has experienced no weight loss or gain in last 6 months  Depression Screening:   PHQ-2 Score: 2      Fall Risk Screening: In the past year, patient has experienced: no history of falling in past year      Home Safety:  Patient has trouble with stairs inside or outside of their home  Patient has working smoke alarms and has working carbon monoxide detector  Home safety hazards include: none  Nutrition:   Current diet is Regular and Diabetic  Medications:   Patient is currently taking over-the-counter supplements  OTC medications include: see medication list  Patient is able to manage medications       Activities of Daily Living (ADLs)/Instrumental Activities of Daily Living (IADLs):   Walk and transfer into and out of bed and chair?: Yes  Dress and groom yourself?: Yes    Bathe or shower yourself?: Yes    Feed yourself?  Yes  Do your laundry/housekeeping?: Yes  Manage your money, pay your bills and track your expenses?: Yes  Make your own meals?: Yes    Do your own shopping?: Yes    Previous Hospitalizations:   Any hospitalizations or ED visits within the last 12 months?: No      PREVENTIVE SCREENINGS      Cardiovascular Screening:    General: Screening Not Indicated and History Lipid Disorder      Diabetes Screening:     General: Screening Not Indicated and History Diabetes      Colorectal Cancer Screening:     General: Screening Current      Prostate Cancer Screening:    General: Screening Current      Abdominal Aortic Aneurysm (AAA) Screening:    Risk factors include: age between 73-69 yo and tobacco use        Lung Cancer Screening:     General: Screening Current      Hepatitis C Screening:    General: Screening Current      Anthony Parsons MD

## 2020-01-03 NOTE — PROGRESS NOTES
Assessment/Plan:    No problem-specific Assessment & Plan notes found for this encounter  Diagnoses and all orders for this visit:    BMI 28 0-28 9,adult    Type 2 diabetes mellitus with diabetic neuropathy, unspecified whether long term insulin use ([address] )    Essential hypertension    Atrial fibrillation with RVR ([address] )    Hyperlipidemia, unspecified hyperlipidemia type          Subjective:      Patient ID: Rush Solorio  is a 81 y o  male  PATIENT RETURNS FOR FOLLOW-UP OF CHRONIC MEDICAL CONDITIONS  NO HOSPITAL STAYS OR EMERGENCY VISITS RECENTLY  MEDS WERE REVIEWED AND NO SIDE EFFECTS  NO NEW ISSUES  UNLESS NOTED BELOW  NO NEW MEDICAL PROVIDER REPORTED  THE CHRONIC DISEASES LISTED ABOVE ARE STABLE AND UNCHANGED/ THE PLAN OF CARE FOR THOSE WILL REMAIN UNCHANGED UNLESS NOTED BELOW  AWV/sub      The following portions of the patient's history were reviewed and updated as appropriate: allergies, current medications, past family history, past medical history, past social history, past surgical history and problem list     Review of Systems   Constitutional: Negative for activity change and appetite change  HENT: Negative for trouble swallowing  Eyes: Negative for visual disturbance  Respiratory: Negative for cough and shortness of breath  Cardiovascular: Negative for chest pain, palpitations and leg swelling  Gastrointestinal: Negative for abdominal pain and blood in stool  Endocrine: Negative for polyuria  Genitourinary: Negative for difficulty urinating and hematuria  Skin: Negative for rash  Neurological: Negative for dizziness  Psychiatric/Behavioral: Negative for behavioral problems           Objective:  Vitals:    01/03/20 1502   BP: 160/80   BP Location: Left arm   Patient Position: Sitting   Cuff Size: Large   Pulse: 68   Temp: 98 6 °F (37 °C)   TempSrc: Temporal   SpO2: 97%   Weight: 106 kg (234 lb 3 2 oz)   Height: 6' 4" (1 93 m)      Physical Exam   Constitutional: He appears well-developed and well-nourished  HENT:   Head: Normocephalic and atraumatic  Eyes: Conjunctivae are normal    Neck: Neck supple  No thyromegaly present  Cardiovascular: Normal rate, regular rhythm, normal heart sounds and intact distal pulses  No murmur heard  Pulmonary/Chest: Effort normal and breath sounds normal  No respiratory distress  Musculoskeletal: He exhibits no edema  Lymphadenopathy:     He has no cervical adenopathy  Skin: Skin is warm and dry  Psychiatric: He has a normal mood and affect  His behavior is normal        Patient's chronic problems that were reviewed today are stable  Recent hospital stays reviewed  Recent labs and imaging reviewed  Recent visits to other providers reviewed  Meds reviewed and no changes made  Appropriate labs and imaging were ordered  Preventive measures appropriate for age and sex were reviewed with patient  Immunizations were updated as appropriate  Assessment and Plan:     Problem List Items Addressed This Visit        Endocrine    Type 2 diabetes mellitus with diabetic neuropathy ([facility] [address] ) (Chronic)       Cardiovascular and Mediastinum    Essential hypertension (Chronic)    Atrial fibrillation with RVR (HCC)       Other    Hyperlipidemia (Chronic)      Other Visit Diagnoses     BMI 28 0-28 9,adult    -  Primary           Preventive health issues were discussed with patient, and age appropriate screening tests were ordered as noted in patient's After Visit Summary  Personalized health advice and appropriate referrals for health education or preventive services given if needed, as noted in patient's After Visit Summary       History of Present Illness:     Patient presents for Medicare Annual Wellness visit    Patient Care Team:  Elizabeth Brooke MD as PCP - General (Family Medicine)     Problem List:     Patient Active Problem List   Diagnosis    Hyperlipidemia    History of TIA (transient ischemic attack)    Type 2 diabetes mellitus with diabetic neuropathy (HCC)    Atrial fibrillation with RVR ([facility] [address] [facility]    Right middle lobe pulmonary nodule    Type 2 diabetes mellitus with foot ulcer (CODE) ([facility] [address] )    Essential hypertension    Acquired absence of other right toe(s) ([facility] [address] )    Benign prostatic hyperplasia with lower urinary tract symptoms      Past Medical and Surgical History:     Past Medical History:   Diagnosis Date    Arthritis     Atrial fibrillation ([facility] [address] )     Diagnosed 11/2018    Benign prostate hyperplasia 04/2002    Cellulitis     right lower leg     Colon polyp 2006    Diabetes mellitus ([facility] [address] )     Hearing aid worn     bilat    Hearing loss     90% loss left ear and 40% right ear    History of clubfoot     "since birth"    History of pneumonia     History of TIA (transient ischemic attack) 04/25/2017 11/30/18 pt denies    Hyperlipidemia 5/15/2014    Hypertension     Infectious viral hepatitis     "cant remember type"    Irregular heart beat     Neuropathy     both feet    Osteomyelitis ([facility] [address] )     right great toe    Right middle lobe pulmonary nodule 11/6/2018    Seasickness     Teeth missing     Type [facility] mellitus [facility] 11/6/2018    Wears glasses     reading    Wound, open     bottom of right foot     Past Surgical History:   Procedure Laterality Date    BUNIONECTOMY Right 12/4/2018    Procedure: 5TH METATARSAL BONE PARTIAL RESECTION, FULL THICKNESS DEBRIDEMENT OF DIABETIC ULCER;  Surgeon: Lis Dailey DPM;  Location: [facility];  Service: Podiatry    CLUB FOOT RELEASE Bilateral     COLONOSCOPY      TOE AMPUTATION Right     partial great toe    VASECTOMY  1992      Family History:     Family History   Problem Relation Age of Onset    Diabetes Father         mellitus      Social History:     Social History     Socioeconomic History    Marital status: /Civil Union     Spouse name: None    Number of children: None    Years of education: None    Highest education level: None Occupational History    None   Social Needs    Financial resource strain: None    Food insecurity:     Worry: None     Inability: None    Transportation needs:     Medical: None     Non-medical: None   Tobacco Use    Smoking status: Former Smoker     Types: Cigarettes     Last attempt to quit: 1988     Years since quittin 1    Smokeless tobacco: Former User   Substance and Sexual Activity    Alcohol use: Yes     Frequency: 2-4 times a month     Comment: socially    Drug use: No    Sexual activity: None   Lifestyle    Physical activity:     Days per week: None     Minutes per session: None    Stress: None   Relationships    Social connections:     Talks on phone: None     Gets together: None     Attends Jain service: None     Active member of club or organization: None     Attends meetings of clubs or organizations: None     Relationship status: None    Intimate partner violence:     Fear of current or ex partner: None     Emotionally abused: None     Physically abused: None     Forced sexual activity: None   Other Topics Concern    None   Social History Narrative    None       Medications and Allergies:     Current Outpatient Medications   Medication Sig Dispense Refill    Acetaminophen (TYLENOL ARTHRITIS PAIN PO) Take 650 mg by mouth 2 (two) times a day      acyclovir (ZOVIRAX) 5 % ointment every 3 (three) hours      amLODIPine (NORVASC) 5 mg tablet Take 1 tablet (5 mg total) by mouth daily 90 tablet 3    apixaban (ELIQUIS) 5 mg Take 1 tablet (5 mg total) by mouth 2 (two) times a day 60 tablet 12    atorvastatin (LIPITOR) 40 mg tablet Take 1 tablet (40 mg total) by mouth daily with dinner 90 tablet 3    Blood Glucose Monitoring Suppl (ACCU-CHEK JYOTI SMARTVIEW) w/Device KIT use to check Blood Sugar as needed      diltiazem (CARDIZEM) 120 MG tablet Take 1 tablet (120 mg total) by mouth every 12 (twelve) hours 60 tablet 12    glimepiride (AMARYL) 4 mg tablet Take one tablet by mouth twice daily 180 tablet 3    glucose blood (ACCU-CHEK SMARTVIEW) test strip Use as instructed  each 12    JANUVIA 100 MG tablet Take 1 tablet (100 mg total) by mouth daily 90 tablet 3    lisinopril (ZESTRIL) 40 mg tablet Take 1 tablet (40 mg total) by mouth daily 90 tablet 3    metFORMIN (GLUCOPHAGE-XR) 750 mg 24 hr tablet Take 1 tablet (750 mg total) by mouth 2 (two) times a day ONE  tablet 3    Misc Natural Products (GLUCOSAMINE CHOND DOUBLE STR PO) Take by mouth 2 (two) times a day         No current facility-administered medications for this visit  Allergies   Allergen Reactions    Simvastatin Myalgia    Itraconazole       Immunizations:     Immunization History   Administered Date(s) Administered    INFLUENZA 12/04/2014, 09/30/2016, 09/30/2016, 11/19/2018    Influenza Split 01/14/2011, 12/20/2011, 11/26/2013    Influenza TIV (IM) 09/11/2009, 12/04/2014    Influenza, high dose seasonal 0 5 mL 09/05/2018, 09/03/2019    Pneumococcal Conjugate 13-Valent 10/24/2017, 11/17/2017    Pneumococcal Polysaccharide PPV23 03/06/2007, 01/04/2019    Zoster 09/18/2015      Health Maintenance:         Topic Date Due    CRC Screening: Colonoscopy  12/07/2021    Hepatitis C Screening  Completed         Topic Date Due    DTaP,Tdap,and Td Vaccines (1 - Tdap) 03/17/1963    Hepatitis B Vaccine (1 of 3 - Risk 3-dose series) 03/17/1971      Medicare Health Risk Assessment:     /80 (BP Location: Left arm, Patient Position: Sitting, Cuff Size: Large)   Pulse 68   Temp 98 6 °F (37 °C) (Temporal)   Ht 6' 4" (1 93 m)   Wt 106 kg (234 lb 3 2 oz)   SpO2 97%   BMI 28 51 kg/m²      July Jones is here for his Subsequent Wellness visit  Last Medicare Wellness visit information reviewed, patient interviewed, no change since last AWV       Depression Screening:   PHQ-2 Score: 2      Previous Hospitalizations:   Any hospitalizations or ED visits within the last 12 months?: No      Advance Care Planning: Living will: No    Durable POA for healthcare: No    Advanced directive: No      Comments: Document given to complete    Cognitive Screening:   Provider or family/friend/caregiver concerned regarding cognition?: No    PREVENTIVE SCREENINGS      Cardiovascular Screening:    General: Screening Not Indicated, History Lipid Disorder and Screening Current      Diabetes Screening:     General: Screening Not Indicated, History Diabetes and Screening Current      Colorectal Cancer Screening:     General: Screening Current      Prostate Cancer Screening:    General: Screening Current      Abdominal Aortic Aneurysm (AAA) Screening:    Risk factors include: age between 73-69 yo and tobacco use        Lung Cancer Screening:     General: Screening Current      Hepatitis C Screening:    General: Screening Current      Camille Osuna MD

## 2020-01-03 NOTE — PATIENT INSTRUCTIONS
Medicare Preventive Visit Patient Instructions  Thank you for completing your Welcome to Medicare Visit or Medicare Annual Wellness Visit today  Your next wellness visit will be due in one year (1/3/2021)  The screening/preventive services that you may require over the next 5-10 years are detailed below  Some tests may not apply to you based off risk factors and/or age  Screening tests ordered at today's visit but not completed yet may show as past due  Also, please note that scanned in results may not display below  Preventive Screenings:  Service Recommendations Previous Testing/Comments   Colorectal Cancer Screening  · Colonoscopy    · Fecal Occult Blood Test (FOBT)/Fecal Immunochemical Test (FIT)  · Fecal DNA/Cologuard Test  · Flexible Sigmoidoscopy Age: 54-65 years old   Colonoscopy: every 10 years (May be performed more frequently if at higher risk)  OR  FOBT/FIT: every 1 year  OR  Cologuard: every 3 years  OR  Sigmoidoscopy: every 5 years  Screening may be recommended earlier than age 48 if at higher risk for colorectal cancer  Also, an individualized decision between you and your healthcare provider will decide whether screening between the ages of 74-80 would be appropriate   Colonoscopy: 12/07/2016  FOBT/FIT: Not on file  Cologuard: Not on file  Sigmoidoscopy: Not on file    Screening Current     Prostate Cancer Screening Individualized decision between patient and health care provider in men between ages of 53-78   Medicare will cover every 12 months beginning on the day after your 50th birthday PSA: 2 0 ng/mL     Screening Current     Hepatitis C Screening Once for adults born between 1945 and 1965  More frequently in patients at high risk for Hepatitis C Hep C Antibody: 11/07/2018    Screening Current   Diabetes Screening 1-2 times per year if you're at risk for diabetes or have pre-diabetes Fasting glucose: 214 mg/dL   A1C: 7 8 %    Screening Not Indicated  History Diabetes   Cholesterol Screening Once every 5 years if you don't have a lipid disorder  May order more often based on risk factors  Lipid panel: 11/08/2018    Screening Not Indicated  History Lipid Disorder      Other Preventive Screenings Covered by Medicare:  1  Abdominal Aortic Aneurysm (AAA) Screening: covered once if your at risk  You're considered to be at risk if you have a family history of AAA or a male between the age of 73-68 who smoking at least 100 cigarettes in your lifetime  2  Lung Cancer Screening: covers low dose CT scan once per year if you meet all of the following conditions: (1) Age 50-69; (2) No signs or symptoms of lung cancer; (3) Current smoker or have quit smoking within the last 15 years; (4) You have a tobacco smoking history of at least 30 pack years (packs per day x number of years you smoked); (5) You get a written order from a healthcare provider  3  Glaucoma Screening: covered annually if you're considered high risk: (1) You have diabetes OR (2) Family history of glaucoma OR (3)  aged 48 and older OR (3)  American aged 72 and older  3  Osteoporosis Screening: covered every 2 years if you meet one of the following conditions: (1) Have a vertebral abnormality; (2) On glucocorticoid therapy for more than 3 months; (3) Have primary hyperparathyroidism; (4) On osteoporosis medications and need to assess response to drug therapy  5  HIV Screening: covered annually if you're between the age of 12-76  Also covered annually if you are younger than 13 and older than 72 with risk factors for HIV infection  For pregnant patients, it is covered up to 3 times per pregnancy      Immunizations:  Immunization Recommendations   Influenza Vaccine Annual influenza vaccination during flu season is recommended for all persons aged >= 6 months who do not have contraindications   Pneumococcal Vaccine (Prevnar and Pneumovax)  * Prevnar = PCV13  * Pneumovax = PPSV23 Adults 25-60 years old: 1-3 doses may be recommended based on certain risk factors  Adults 72 years old: Prevnar (PCV13) vaccine recommended followed by Pneumovax (PPSV23) vaccine  If already received PPSV23 since turning 65, then PCV13 recommended at least one year after PPSV23 dose  Hepatitis B Vaccine 3 dose series if at intermediate or high risk (ex: diabetes, end stage renal disease, liver disease)   Tetanus (Td) Vaccine - COST NOT COVERED BY MEDICARE PART B Following completion of primary series, a booster dose should be given every 10 years to maintain immunity against tetanus  Td may also be given as tetanus wound prophylaxis  Tdap Vaccine - COST NOT COVERED BY MEDICARE PART B Recommended at least once for all adults  For pregnant patients, recommended with each pregnancy  Shingles Vaccine (Shingrix) - COST NOT COVERED BY MEDICARE PART B  2 shot series recommended in those aged 48 and above     Health Maintenance Due:      Topic Date Due    CRC Screening: Colonoscopy  12/07/2021    Hepatitis C Screening  Completed     Immunizations Due:      Topic Date Due    DTaP,Tdap,and Td Vaccines (1 - Tdap) 03[birth year]/1963    Hepatitis B Vaccine (1 of 3 - Risk 3-dose series) 03/17/1971     Advance Directives   What are advance directives? Advance directives are legal documents that state your wishes and plans for medical care  These plans are made ahead of time in case you lose your ability to make decisions for yourself  Advance directives can apply to any medical decision, such as the treatments you want, and if you want to donate organs  What are the types of advance directives? There are many types of advance directives, and each state has rules about how to use them  You may choose a combination of any of the following:  · Living will: This is a written record of the treatment you want  You can also choose which treatments you do not want, which to limit, and which to stop at a certain time   This includes surgery, medicine, IV fluid, and tube feedings  · Durable power of  for healthcare [facility]): This is a written record that states who you want to make healthcare choices for you when you are unable to make them for yourself  This person, called a proxy, is usually a family member or a friend  You may choose more than 1 proxy  · Do not resuscitate (DNR) order:  A DNR order is used in case your heart stops beating or you stop breathing  It is a request not to have certain forms of treatment, such as CPR  A DNR order may be included in other types of advance directives  · Medical directive: This covers the care that you want if you are in a coma, near death, or unable to make decisions for yourself  You can list the treatments you want for each condition  Treatment may include pain medicine, surgery, blood transfusions, dialysis, IV or tube feedings, and a ventilator (breathing machine)  · Values history: This document has questions about your views, beliefs, and how you feel and think about life  This information can help others choose the care that you would choose  Why are advance directives important? An advance directive helps you control your care  Although spoken wishes may be used, it is better to have your wishes written down  Spoken wishes can be misunderstood, or not followed  Treatments may be given even if you do not want them  An advance directive may make it easier for your family to make difficult choices about your care  Weight Management   Why it is important to manage your weight:  Being overweight increases your risk of health conditions such as heart disease, high blood pressure, type 2 diabetes, and certain types of cancer  It can also increase your risk for osteoarthritis, sleep apnea, and other respiratory problems  Aim for a slow, steady weight loss  Even a small amount of weight loss can lower your risk of health problems    How to lose weight safely:  A safe and healthy way to lose weight is to eat fewer calories and get regular exercise  You can lose up about 1 pound a week by decreasing the number of calories you eat by 500 calories each day  Healthy meal plan for weight management:  A healthy meal plan includes a variety of foods, contains fewer calories, and helps you stay healthy  A healthy meal plan includes the following:  · Eat whole-grain foods more often  A healthy meal plan should contain fiber  Fiber is the part of grains, fruits, and vegetables that is not broken down by your body  Whole-grain foods are healthy and provide extra fiber in your diet  Some examples of whole-grain foods are whole-wheat breads and pastas, oatmeal, brown rice, and bulgur  · Eat a variety of vegetables every day  Include dark, leafy greens such as spinach, kale, lisa greens, and mustard greens  Eat yellow and orange vegetables such as carrots, sweet potatoes, and winter squash  · Eat a variety of fruits every day  Choose fresh or canned fruit (canned in its own juice or light syrup) instead of juice  Fruit juice has very little or no fiber  · Eat low-fat dairy foods  Drink fat-free (skim) milk or 1% milk  Eat fat-free yogurt and low-fat cottage cheese  Try low-fat cheeses such as mozzarella and other reduced-fat cheeses  · Choose meat and other protein foods that are low in fat  Choose beans or other legumes such as split peas or lentils  Choose fish, skinless poultry (chicken or turkey), or lean cuts of red meat (beef or pork)  Before you cook meat or poultry, cut off any visible fat  · Use less fat and oil  Try baking foods instead of frying them  Add less fat, such as margarine, sour cream, regular salad dressing and mayonnaise to foods  Eat fewer high-fat foods  Some examples of high-fat foods include french fries, doughnuts, ice cream, and cakes  · Eat fewer sweets  Limit foods and drinks that are high in sugar  This includes candy, cookies, regular soda, and sweetened drinks    Exercise:  Exercise at least 30 minutes per day on most days of the week  Some examples of exercise include walking, biking, dancing, and swimming  You can also fit in more physical activity by taking the stairs instead of the elevator or parking farther away from stores  Ask your healthcare provider about the best exercise plan for you  © Copyright [employer] 2018 Information is for End User's use only and may not be sold, redistributed or otherwise used for commercial purposes  All illustrations and images included in CareNotes® are the copyrighted property of [employer]  or Tianna Blunt   Weight Management   AMBULATORY CARE:   Why it is important to manage your weight:  Being overweight increases your risk of health conditions such as heart disease, high blood pressure, type 2 diabetes, and certain types of cancer  It can also increase your risk for osteoarthritis, sleep apnea, and other respiratory problems  Aim for a slow, steady weight loss  Even a small amount of weight loss can lower your risk of health problems  How to lose weight safely:  A safe and healthy way to lose weight is to eat fewer calories and get regular exercise  You can lose up about 1 pound a week by decreasing the number of calories you eat by 500 calories each day  You can decrease calories by eating smaller portion sizes or by cutting out high-calorie foods  Read labels to find out how many calories are in the foods you eat  You can also burn calories with exercise such as walking, swimming, or biking  You will be more likely to keep weight off if you make these changes part of your lifestyle  Healthy meal plan for weight management:  A healthy meal plan includes a variety of foods, contains fewer calories, and helps you stay healthy  A healthy meal plan includes the following:  · Eat whole-grain foods more often  A healthy meal plan should contain fiber   Fiber is the part of grains, fruits, and vegetables that is not broken down by your body  Whole-grain foods are healthy and provide extra fiber in your diet  Some examples of whole-grain foods are whole-wheat breads and pastas, oatmeal, brown rice, and bulgur  · Eat a variety of vegetables every day  Include dark, leafy greens such as spinach, kale, lisa greens, and mustard greens  Eat yellow and orange vegetables such as carrots, sweet potatoes, and winter squash  · Eat a variety of fruits every day  Choose fresh or canned fruit (canned in its own juice or light syrup) instead of juice  Fruit juice has very little or no fiber  · Eat low-fat dairy foods  Drink fat-free (skim) milk or 1% milk  Eat fat-free yogurt and low-fat cottage cheese  Try low-fat cheeses such as mozzarella and other reduced-fat cheeses  · Choose meat and other protein foods that are low in fat  Choose beans or other legumes such as split peas or lentils  Choose fish, skinless poultry (chicken or turkey), or lean cuts of red meat (beef or pork)  Before you cook meat or poultry, cut off any visible fat  · Use less fat and oil  Try baking foods instead of frying them  Add less fat, such as margarine, sour cream, regular salad dressing and mayonnaise to foods  Eat fewer high-fat foods  Some examples of high-fat foods include french fries, doughnuts, ice cream, and cakes  · Eat fewer sweets  Limit foods and drinks that are high in sugar  This includes candy, cookies, regular soda, and sweetened drinks  Ways to decrease calories:   · Eat smaller portions  ¨ Use a small plate with smaller servings  ¨ Do not eat second helpings  ¨ When you eat at a restaurant, ask for a box and place half of your meal in the box before you eat  ¨ Share an entrée with someone else  · Replace high-calorie snacks with healthy, low-calorie snacks  ¨ Choose fresh fruit, vegetables, fat-free rice cakes, or air-popped popcorn instead of potato chips, nuts, or chocolate      ¨ Choose water or calorie-free drinks instead of soda or sweetened drinks  · Eat regular meals  Skipping meals can lead to overeating later in the day  Eat a healthy snack in place of a meal if you do not have time to eat a regular meal      · Do not shop for groceries when you are hungry  You may be more likely to make unhealthy food choices  Take a grocery list of healthy foods and shop after you have eaten  Exercise:  Exercise at least 30 minutes per day on most days of the week  Some examples of exercise include walking, biking, dancing, and swimming  You can also fit in more physical activity by taking the stairs instead of the elevator or parking farther away from stores  Ask your healthcare provider about the best exercise plan for you  Other things to consider as you try to lose weight:   · Be aware of situations that may give you the urge to overeat, such as eating while watching television  Find ways to avoid these situations  For example, read a book, go for a walk, or do crafts  · Meet with a weight loss support group or friends who are also trying to lose weight  This may help you stay motivated to continue working on your weight loss goals  © 2017 [address] [employer]  Information is for End User's use only and may not be sold, redistributed or otherwise used for commercial purposes  All illustrations and images included in CareNotes® are the copyrighted property of [employer]  or Gregory Ingram  The above information is an  only  It is not intended as medical advice for individual conditions or treatments  Talk to your doctor, nurse or pharmacist before following any medical regimen to see if it is safe and effective for you  Heart Healthy Diet   AMBULATORY CARE:   A heart healthy diet  is an eating plan low in total fat, unhealthy fats, and sodium (salt)  A heart healthy diet helps decrease your risk for heart disease and stroke   Limit the amount of fat you eat to 25% to 35% of your total daily calories  Limit sodium to less than 2,300 mg each day  Healthy fats:  Healthy fats can help improve cholesterol levels  The risk for heart disease is decreased when cholesterol levels are normal  Choose healthy fats, such as the following:  · Unsaturated fat  is found in foods such as soybean, canola, olive, corn, and safflower oils  It is also found in soft tub margarine that is made with liquid vegetable oil  · Omega-3 fat  is found in certain fish, such as salmon, tuna, and trout, and in walnuts and flaxseed  Unhealthy fats:  Unhealthy fats can cause unhealthy cholesterol levels in your blood and increase your risk of heart disease  Limit unhealthy fats, such as the following:  · Cholesterol  is found in animal foods, such as eggs and lobster, and in dairy products made from whole milk  Limit cholesterol to less than 300 milligrams (mg) each day  You may need to limit cholesterol to 200 mg each day if you have heart disease  · Saturated fat  is found in meats, such as hedrick and hamburger  It is also found in chicken or turkey skin, whole milk, and butter  Limit saturated fat to less than 7% of your total daily calories  Limit saturated fat to less than 6% if you have heart disease or are at increased risk for it  · Trans fat  is found in packaged foods, such as potato chips and cookies  It is also in hard margarine, some fried foods, and shortening  Avoid trans fats as much as possible    Heart healthy foods and drinks to include:  Ask your dietitian or healthcare provider how many servings to have from each of the following food groups:  · Grains:      ¨ Whole-wheat breads, cereals, and pastas, and brown rice    ¨ Low-fat, low-sodium crackers and chips    · Vegetables:      ¨ Broccoli, green beans, green peas, and spinach    ¨ Collards, kale, and lima beans    ¨ Carrots, sweet potatoes, tomatoes, and peppers    ¨ Canned vegetables with no salt added    · Fruits:      ¨ Bananas, peaches, pears, and pineapple    ¨ Grapes, raisins, and dates    ¨ Oranges, tangerines, grapefruit, orange juice, and grapefruit juice    ¨ Apricots, mangoes, melons, and papaya    ¨ Raspberries and strawberries    ¨ Canned fruit with no added sugar    · Low-fat dairy products:      ¨ Nonfat (skim) milk, 1% milk, and low-fat almond, cashew, or soy milks fortified with calcium    ¨ Low-fat cheese, regular or frozen yogurt, and cottage cheese    · Meats and proteins , such as lean cuts of beef and pork (loin, leg, round), skinless chicken and turkey, legumes, soy products, egg whites, and nuts  Foods and drinks to limit or avoid:  Ask your dietitian or healthcare provider about these and other foods that are high in unhealthy fat, sodium, and sugar:  · Snack or packaged foods , such as frozen dinners, cookies, macaroni and cheese, and cereals with more than 300 mg of sodium per serving    · Canned or dry mixes  for cakes, soups, sauces, or gravies    · Vegetables with added sodium , such as instant potatoes, vegetables with added sauces, or regular canned vegetables    · Other foods high in sodium , such as ketchup, barbecue sauce, salad dressing, pickles, olives, soy sauce, and miso    · High-fat dairy foods  such as whole or 2% milk, cream cheese, or sour cream, and cheeses     · High-fat protein foods  such as high-fat cuts of beef (T-bone steaks, ribs), chicken or turkey with skin, and organ meats, such as liver    · Cured or smoked meats , such as hot dogs, hedrick, and sausage    · Unhealthy fats and oils , such as butter, stick margarine, shortening, and cooking oils such as coconut or palm oil    · Food and drinks high in sugar , such as soft drinks (soda), sports drinks, sweetened tea, candy, cake, cookies, pies, and doughnuts  Other diet guidelines to follow:   · Eat more foods containing omega-3 fats  Eat fish high in omega-3 fats at least 2 times a week  · Limit alcohol    Too much alcohol can damage your heart and raise your blood pressure  Women should limit alcohol to 1 drink a day  Men should limit alcohol to 2 drinks a day  A drink of alcohol is 12 ounces of beer, 5 ounces of wine, or 1½ ounces of liquor  · Choose low-sodium foods  High-sodium foods can lead to high blood pressure  Add little or no salt to food you prepare  Use herbs and spices in place of salt  · Eat more fiber  to help lower cholesterol levels  Eat at least 5 servings of fruits and vegetables each day  Eat 3 ounces of whole-grain foods each day  Legumes (beans) are also a good source of fiber  Lifestyle guidelines:   · Do not smoke  Nicotine and other chemicals in cigarettes and cigars can cause lung and heart damage  Ask your healthcare provider for information if you currently smoke and need help to quit  E-cigarettes or smokeless tobacco still contain nicotine  Talk to your healthcare provider before you use these products  · Exercise regularly  to help you maintain a healthy weight and improve your blood pressure and cholesterol levels  Ask your healthcare provider about the best exercise plan for you  Do not start an exercise program without asking your healthcare provider  Follow up with your healthcare provider as directed:  Write down your questions so you remember to ask them during your visits  © 2017 [address] [facility] Information is for End User's use only and may not be sold, redistributed or otherwise used for commercial purposes  All illustrations and images included in CareNotes® are the copyrighted property of [employer]  or Gregory Ingram  The above information is an  only  It is not intended as medical advice for individual conditions or treatments  Talk to your doctor, nurse or pharmacist before following any medical regimen to see if it is safe and effective for you  Calorie Counting Diet   WHAT YOU NEED TO KNOW:   What is a calorie counting diet?   It is a meal plan based on counting calories each day to reach a healthy body weight  You will need to eat fewer calories if you are trying to lose weight  Weight loss may decrease your risk for certain health problems or improve your health if you have health problems  Some of these health problems include heart disease, high blood pressure, and diabetes  What foods should I avoid? Your dietitian will tell you if you need to avoid certain foods based on your body weight and health condition  You may need to avoid high-fat foods if you are at risk for or have heart disease  You may need to eat fewer foods from the breads and starches food group if you have diabetes  How many calories are in foods? The following is a list of foods and drinks with the approximate number of calories in each  Check the food label to find the exact number of calories  A dietitian can tell you how many calories you should have from each food group each day    · Carbohydrate:      ¨ ½ of a 3-inch bagel, 1 slice of bread, or ½ of a hamburger bun or hot dog bun (80)    ¨ 1 (8-inch) flour tortilla or ½ cup of cooked rice (100)    ¨ 1 (6-inch) corn tortilla (80)    ¨ 1 (6-inch) pancake or 1 cup of bran flakes cereal (110)    ¨ ½ cup of cooked cereal (80)    ¨ ½ cup of cooked pasta (85)    ¨ 1 ounce of pretzels (100)    ¨ 3 cups of air-popped popcorn without butter or oil (80)    · Dairy:      ¨ 1 cup of skim or 1% milk (90)    ¨ 1 cup of 2% milk (120)    ¨ 1 cup of whole milk (160)    ¨ 1 cup of 2% chocolate milk (220)    ¨ 1 ounce of low-fat cheese with 3 grams of fat per ounce (70)    ¨ 1 ounce of cheddar cheese (114)    ¨ ½ cup of 1% fat cottage cheese (80)    ¨ 1 cup of plain or sugar-free, fat-free yogurt (90)    · Protein foods:      ¨ 3 ounces of fish (not breaded or fried) (95)    ¨ 3 ounces of breaded, fried fish (195)    ¨ ¾ cup of tuna canned in water (105)    ¨ 3 ounces of chicken breast without skin (105)    ¨ 1 fried chicken breast with skin (350)    ¨ ¼ cup of fat free egg substitute (40)    ¨ 1 large egg (75)    ¨ 3 ounces of lean beef or pork (165)    ¨ 3 ounces of fried pork chop or ham (185)    ¨ ½ cup of cooked dried beans, such as kidney, bustamante, lentils, or navy (115)    ¨ 3 ounces of bologna or lunch meat (225)    ¨ 2 links of breakfast sausage (140)    · Vegetables:      ¨ ½ cup of sliced mushrooms (10)    ¨ 1 cup of salad greens, such as lettuce, spinach, or adrienne (15)    ¨ ½ cup of steamed asparagus (20)    ¨ ½ cup of cooked summer squash, zucchini squash, or green or wax beans (25)    ¨ 1 cup of broccoli or cauliflower florets, or 1 medium tomato (25)    ¨ 1 large raw carrot or ½ cup of cooked carrots (40)    ¨ ? of a medium cucumber or 1 stalk of celery (5)    ¨ 1 small baked potato (160)    ¨ 1 cup of breaded, fried vegetables (230)    · Fruit:      ¨ 1 (6-inch) banana (55)     ¨ ½ of a 4-inch grapefruit (55)    ¨ 15 grapes (60)    ¨ 1 medium orange or apple (70)    ¨ 1 large peach (65)    ¨ 1 cup of fresh pineapple chunks (75)    ¨ 1 cup of melon cubes (50)    ¨ 1¼ cups of whole strawberries (45)    ¨ ½ cup of fruit canned in juice (55)    ¨ ½ cup of fruit canned in heavy syrup (110)    ¨ ?  cup of raisins (130)    ¨ ½ cup of unsweetened fruit juice (60)    ¨ ½ cup of grape, cranberry, or prune juice (90)    · Fat:      ¨ 10 peanuts or 2 teaspoons of peanut butter (55)    ¨ 2 tablespoons of avocado or 1 tablespoon of regular salad dressing (45)    ¨ 2 slices of hedrick (90)    ¨ 1 teaspoon of oil, such as safflower, canola, corn, or olive oil (45)    ¨ 2 teaspoons of low-fat margarine, or 1 tablespoon of low-fat mayonnaise (50)    ¨ 1 teaspoon of regular margarine (40)    ¨ 1 tablespoon of regular mayonnaise (135)    ¨ 1 tablespoon of cream cheese or 2 tablespoons of low-fat cream cheese (45)    ¨ 2 tablespoons of vegetable shortening (215)    · Dessert and sweets:      ¨ 8 animal crackers or 5 vanilla wafers (80)    ¨ 1 frozen fruit juice bar (80)    ¨ ½ cup of ice milk or low-fat frozen yogurt (90)    ¨ ½ cup of sherbet or sorbet (125)    ¨ ½ cup of sugar-free pudding or custard (60)    ¨ ½ cup of ice cream (140)    ¨ ½ cup of pudding or custard (175)    ¨ 1 (2-inch) square chocolate brownie (185)    · Combination foods:      ¨ Bean burrito made with an 8-inch tortilla, without cheese (275)    ¨ Chicken breast sandwich with lettuce and tomato (325)    ¨ 1 cup of chicken noodle soup (60)    ¨ 1 beef taco (175)    ¨ Regular hamburger with lettuce and tomato (310)    ¨ Regular cheeseburger with lettuce and tomato (410)     ¨ ¼ of a 12-inch cheese pizza (280)    ¨ Fried fish sandwich with lettuce and tomato (425)    ¨ Hot dog and bun (275)    ¨ 1½ cups of macaroni and cheese (310)    ¨ Taco salad with a fried tortilla shell (870)    · Low-calorie foods:      ¨ 1 tablespoon of ketchup or 1 tablespoon of fat free sour cream (15)    ¨ 1 teaspoon of mustard (5)    ¨ ¼ cup of salsa (20)    ¨ 1 large dill pickle (15)    ¨ 1 tablespoon of fat free salad dressing (10)    ¨ 2 teaspoons of low-sugar, light jam or jelly, or 1 tablespoon of sugar-free syrup (15)    ¨ 1 sugar-free popsicle (15)    ¨ 1 cup of club soda, seltzer water, or diet soda (0)  CARE AGREEMENT:   You have the right to help plan your care  Discuss treatment options with your caregivers to decide what care you want to receive  You always have the right to refuse treatment  The above information is an  only  It is not intended as medical advice for individual conditions or treatments  Talk to your doctor, nurse or pharmacist before following any medical regimen to see if it is safe and effective for you  © 2017 [address] [employer] Information is for End User's use only and may not be sold, redistributed or otherwise used for commercial purposes   All illustrations and images included in CareNotes® are the copyrighted property of [employer]  or [employer]  WE RECOMMEND GETTING THE 2- DOSE SHINGLES VACCINE ([address]) FROM YOUR PHARMACY  CHECK WITH THEM ON THE COST  YOU SHOULD HAVE THIS IF OVER AGE 48, EVEN IF YOU HAVE HAD THE ORIGINAL VACCINE (ZOSTRIX)

## 2020-01-16 ENCOUNTER — PROCEDURE VISIT (OUTPATIENT)
Dept: UROLOGY | Facility: MEDICAL CENTER | Age: 70
End: 2020-01-16
Payer: MEDICARE

## 2020-01-16 VITALS
DIASTOLIC BLOOD PRESSURE: 80 MMHG | HEART RATE: 72 BPM | HEIGHT: 76 IN | SYSTOLIC BLOOD PRESSURE: 130 MMHG | WEIGHT: 234 LBS | BODY MASS INDEX: 28.49 KG/M2

## 2020-01-16 DIAGNOSIS — N13.8 BPH WITH OBSTRUCTION/LOWER URINARY TRACT SYMPTOMS: Primary | ICD-10-CM

## 2020-01-16 DIAGNOSIS — Z87.440 HISTORY OF ACUTE CYSTITIS: ICD-10-CM

## 2020-01-16 DIAGNOSIS — N40.1 BPH WITH OBSTRUCTION/LOWER URINARY TRACT SYMPTOMS: Primary | ICD-10-CM

## 2020-01-16 DIAGNOSIS — E11.69 ERECTILE DYSFUNCTION ASSOCIATED WITH TYPE 2 DIABETES MELLITUS (HCC): ICD-10-CM

## 2020-01-16 DIAGNOSIS — Z87.448 HISTORY OF GROSS HEMATURIA: ICD-10-CM

## 2020-01-16 DIAGNOSIS — N52.1 ERECTILE DYSFUNCTION ASSOCIATED WITH TYPE 2 DIABETES MELLITUS (HCC): ICD-10-CM

## 2020-01-16 LAB — POST-VOID RESIDUAL VOLUME, ML POC: 52 ML

## 2020-01-16 PROCEDURE — 99214 OFFICE O/P EST MOD 30 MIN: CPT | Performed by: UROLOGY

## 2020-01-16 PROCEDURE — 51798 US URINE CAPACITY MEASURE: CPT | Performed by: UROLOGY

## 2020-01-16 PROCEDURE — 52000 CYSTOURETHROSCOPY: CPT | Performed by: UROLOGY

## 2020-01-16 PROCEDURE — 76872 US TRANSRECTAL: CPT | Performed by: UROLOGY

## 2020-01-16 PROCEDURE — 51736 URINE FLOW MEASUREMENT: CPT | Performed by: UROLOGY

## 2020-01-16 RX ORDER — SULFAMETHOXAZOLE AND TRIMETHOPRIM 800; 160 MG/1; MG/1
1 TABLET ORAL EVERY 12 HOURS SCHEDULED
Qty: 4 TABLET | Refills: 0 | Status: SHIPPED | OUTPATIENT
Start: 2020-01-16 | End: 2020-01-18

## 2020-01-16 NOTE — LETTER
January 16, 2020     Raúl Muhammad, [address] [place] [id number]    Patient: Jonah Espinoza  YOB: 1950   Date of Visit: 1/16/2020       Dear Dr Lorena Tomlin:    Thank you for referring Smitha Eastman to me for evaluation  Below are my notes for this consultation  If you have questions, please do not hesitate to call me  I look forward to following your patient along with you  Sincerely,        Jeremías Lewis MD        CC: No Recipients  Jeremías Lewis MD  1/16/2020 10:41 AM  Sign at close encounter  Assessment/Plan:   1  History of acute cystitis - resolved with symptoms improving with improvement of his urinary stream almost complete cessation of nocturia now 0-1 time per night with decrease in urinary frequency and urgency  2   BPH with lower urinary tract symptoms  Urinary flow rate seemed to be only minimally sub normal and although on cystourethroscopy the patient does have significant bilobar enlargement of the lateral lobes of the prostate with visual occlusion of the bladder outlet and trabeculation of the bladder was cellule formation and a small median lobe the patient's symptoms do not warrant surgical or medical intervention at this point  AUA symptom score will be followed again in 1 year  3  Erectile dysfunction  Patient has not tried Viagra as prescribed  Instead he took Cialis 20 mg had a headache and some pressure in his chest with some sensation of palpitations  He did not attempt to stimulate erection  He was advised to try the Viagra 100 mg and reassured about the symptoms / side effects again  4   History of gross hematuria -resolved -CT scan revealed normal upper urinary tracts with only prostatomegaly identified   Diagnoses and all orders for this visit:    BPH with obstruction/lower urinary tract symptoms  -     POCT Measure PVR  -     Uroflow  -     sulfamethoxazole-trimethoprim (BACTRIM DS) 800-160 mg per tablet;  Take 1 tablet by mouth every 12 (twelve) hours for 2 days  -     PSA Total, Diagnostic; Future  -     Comprehensive metabolic panel; Future    History of acute cystitis    Erectile dysfunction associated with type 2 diabetes mellitus ([address] )    History of gross hematuria  -     Cystoscopy          Subjective:     Patient ID: Duarte Isabel  is a 81 y o  male  HPI    74-year-old male who presented with a history foul-smelling urine with dysuria and frequency and change in voiding pattern found to have acute cystitis treated appropriately with antibiotics with resolution of his symptoms  He had a questionable history of gross hematuria and also erectile dysfunction and the patient was evaluated for these issues and returns for cystoscopy uroflow and transrectal ultrasound of the prostate today  Review of Systems   Genitourinary:        See HPI   All other systems reviewed and are negative  Objective:     Physical Exam   Constitutional: He is oriented to person, place, and time  He appears well-developed and well-nourished  No distress  HENT:   Head: Atraumatic  Eyes: EOM are normal    Neck: Neck supple  Pulmonary/Chest: Effort normal    Abdominal: Soft  Genitourinary: Penis normal    Neurological: He is alert and oriented to person, place, and time  Psychiatric: He has a normal mood and affect  His behavior is normal  Judgment and thought content normal          Veronica Santo MD  1/16/2020 10:32 AM  Sign at close encounter  Uroflow  Date/Time: 1/16/2020 10:21 AM  Performed by: Veronica Santo MD  Authorized by: Veronica Santo MD   Procedure - Urodynamics:  Procedure details: Simple Uroflow          Post-procedure:     Patient tolerance: Patient tolerated procedure well with no immediate complications      Comments:      Patient voided a total of 304 7 cc with a maximum rate of 28 8 an average rate of 13 4 cc/second  Flow rates are minimally sub normal   Clinical correlation is recommended              Maria L Roe Harley Butler MD  1/16/2020 10:37 AM  Sign at close encounter  Cystoscopy  Date/Time: 1/16/2020 10:18 AM  Performed by: Rosey Carmen MD  Authorized by: Rosey Carmen MD     Procedure details: cystoscopy    Patient tolerance: Patient tolerated the procedure well with no immediate complications    Additional Procedure Details: The patient was placed supine on the examining table in the urethral meatus prepped with Betadine  2% lidocaine lubricant was instilled up the urethra and left indwelling for 5 minutes  Flexible video cystourethroscopy took place revealing a normal anterior urethra except for a deep bulbar urethral stricture that was nonobstructing and only minimal   The prostatic urethra revealed bilobar enlargement of the lateral lobes of the prostate with significant visual occlusion of the bladder outlet  There was slight median lobe enlargement but this does not appear to be obstructive  Approximately 6-8 Uro lift implants would be appropriate for this prostate  Urinary bladder was moderately trabeculated with posterior wall cellule formation  No intrinsic lesions or extrinsic mass compression was identified  Ureteral orifices were normal position and configuration bilaterally with clear efflux bilaterally  The bladder was left full for uroflow  After completion of uroflow the patient laid on the examining table in right flank up position and the transrectal ultrasound probe covered with a condom and lubricated was placed into the rectal vault with transverse and sagittal imaging of the prostate obtained  The prostate was enlarged with no evidence of hypoechoic peripheral zone lesions or seminal vesicle abnormalities noted  There was some periurethral calcification noted  There was bilateral adenoma and some median lobe enlargement  Prostate size was approximately 49 cc with a 40 8 transverse diameter measured  The probe was removed atraumatically and the patient recovered

## 2020-01-16 NOTE — PROGRESS NOTES
Cystoscopy  Date/Time: 1/16/2020 10:18 AM  Performed by: Kae Rock MD  Authorized by: Kae Rock MD     Procedure details: cystoscopy    Patient tolerance: Patient tolerated the procedure well with no immediate complications    Additional Procedure Details: The patient was placed supine on the examining table in the urethral meatus prepped with Betadine  2% lidocaine lubricant was instilled up the urethra and left indwelling for 5 minutes  Flexible video cystourethroscopy took place revealing a normal anterior urethra except for a deep bulbar urethral stricture that was nonobstructing and only minimal   The prostatic urethra revealed bilobar enlargement of the lateral lobes of the prostate with significant visual occlusion of the bladder outlet  There was slight median lobe enlargement but this does not appear to be obstructive  Approximately 6-8 Uro lift implants would be appropriate for this prostate  Urinary bladder was moderately trabeculated with posterior wall cellule formation  No intrinsic lesions or extrinsic mass compression was identified  Ureteral orifices were normal position and configuration bilaterally with clear efflux bilaterally  The bladder was left full for uroflow  After completion of uroflow the patient laid on the examining table in right flank up position and the transrectal ultrasound probe covered with a condom and lubricated was placed into the rectal vault with transverse and sagittal imaging of the prostate obtained  The prostate was enlarged with no evidence of hypoechoic peripheral zone lesions or seminal vesicle abnormalities noted  There was some periurethral calcification noted  There was bilateral adenoma and some median lobe enlargement  Prostate size was approximately 49 cc with a 40 8 transverse diameter measured  The probe was removed atraumatically and the patient recovered

## 2020-01-16 NOTE — PROGRESS NOTES
Uroflow  Date/Time: 1/16/2020 10:21 AM  Performed by: Mack Aguirre MD  Authorized by: Mack Aguirre MD   Procedure - Urodynamics:  Procedure details: Simple Uroflow          Post-procedure:     Patient tolerance: Patient tolerated procedure well with no immediate complications      Comments:      Patient voided a total of 304 7 cc with a maximum rate of 28 8 an average rate of 13 4 cc/second  Flow rates are minimally sub normal   Clinical correlation is recommended

## 2020-01-16 NOTE — PROGRESS NOTES
Assessment/Plan:   1  History of acute cystitis - resolved with symptoms improving with improvement of his urinary stream almost complete cessation of nocturia now 0-1 time per night with decrease in urinary frequency and urgency  2   BPH with lower urinary tract symptoms  Urinary flow rate seemed to be only minimally sub normal and although on cystourethroscopy the patient does have significant bilobar enlargement of the lateral lobes of the prostate with visual occlusion of the bladder outlet and trabeculation of the bladder was cellule formation and a small median lobe the patient's symptoms do not warrant surgical or medical intervention at this point  AUA symptom score will be followed again in 1 year  3  Erectile dysfunction  Patient has not tried Viagra as prescribed  Instead he took Cialis 20 mg had a headache and some pressure in his chest with some sensation of palpitations  He did not attempt to stimulate erection  He was advised to try the Viagra 100 mg and reassured about the symptoms / side effects again  4   History of gross hematuria -resolved -CT scan revealed normal upper urinary tracts with only prostatomegaly identified   Diagnoses and all orders for this visit:    BPH with obstruction/lower urinary tract symptoms  -     POCT Measure PVR  -     Uroflow  -     sulfamethoxazole-trimethoprim (BACTRIM DS) 800-160 mg per tablet; Take 1 tablet by mouth every 12 (twelve) hours for 2 days  -     PSA Total, Diagnostic; Future  -     Comprehensive metabolic panel; Future    History of acute cystitis    Erectile dysfunction associated with type 2 diabetes mellitus [facility] )    History of gross hematuria  -     Cystoscopy          Subjective:     Patient ID: Cecil Draper  is a 81 y o  male      HPI    56-year-old male who presented with a history foul-smelling urine with dysuria and frequency and change in voiding pattern found to have acute cystitis treated appropriately with antibiotics with resolution of his symptoms  He had a questionable history of gross hematuria and also erectile dysfunction and the patient was evaluated for these issues and returns for cystoscopy uroflow and transrectal ultrasound of the prostate today  Review of Systems   Genitourinary:        See HPI   All other systems reviewed and are negative  Objective:     Physical Exam   Constitutional: He is oriented to person, place, and time  He appears well-developed and well-nourished  No distress  HENT:   Head: Atraumatic  Eyes: EOM are normal    Neck: Neck supple  Pulmonary/Chest: Effort normal    Abdominal: Soft  Genitourinary: Penis normal    Neurological: He is alert and oriented to person, place, and time  Psychiatric: He has a normal mood and affect   His behavior is normal  Judgment and thought content normal

## 2020-01-22 ENCOUNTER — DOCUMENTATION (OUTPATIENT)
Dept: FAMILY MEDICINE CLINIC | Facility: CLINIC | Age: 70
End: 2020-01-22

## 2020-07-09 ENCOUNTER — OFFICE VISIT (OUTPATIENT)
Dept: FAMILY MEDICINE CLINIC | Facility: CLINIC | Age: 70
End: 2020-07-09
Payer: MEDICARE

## 2020-07-09 VITALS
BODY MASS INDEX: 24.72 KG/M2 | SYSTOLIC BLOOD PRESSURE: 140 MMHG | DIASTOLIC BLOOD PRESSURE: 90 MMHG | HEART RATE: 89 BPM | OXYGEN SATURATION: 97 % | TEMPERATURE: 97.1 F | WEIGHT: 203 LBS | HEIGHT: 76 IN

## 2020-07-09 DIAGNOSIS — E11.40 TYPE 2 DIABETES MELLITUS WITH DIABETIC NEUROPATHY, UNSPECIFIED WHETHER LONG TERM INSULIN USE (HCC): Primary | Chronic | ICD-10-CM

## 2020-07-09 DIAGNOSIS — R39.12 BENIGN PROSTATIC HYPERPLASIA WITH WEAK URINARY STREAM: Chronic | ICD-10-CM

## 2020-07-09 DIAGNOSIS — Z86.73 HISTORY OF TIA (TRANSIENT ISCHEMIC ATTACK): ICD-10-CM

## 2020-07-09 DIAGNOSIS — I48.91 ATRIAL FIBRILLATION WITH RVR (HCC): ICD-10-CM

## 2020-07-09 DIAGNOSIS — E11.621 TYPE 2 DIABETES MELLITUS WITH FOOT ULCER (CODE) (HCC): ICD-10-CM

## 2020-07-09 DIAGNOSIS — Z89.421 ACQUIRED ABSENCE OF OTHER RIGHT TOE(S) (HCC): ICD-10-CM

## 2020-07-09 DIAGNOSIS — I10 ESSENTIAL HYPERTENSION: Chronic | ICD-10-CM

## 2020-07-09 DIAGNOSIS — E78.5 HYPERLIPIDEMIA, UNSPECIFIED HYPERLIPIDEMIA TYPE: Chronic | ICD-10-CM

## 2020-07-09 DIAGNOSIS — N40.1 BENIGN PROSTATIC HYPERPLASIA WITH WEAK URINARY STREAM: Chronic | ICD-10-CM

## 2020-07-09 DIAGNOSIS — R91.1 RIGHT MIDDLE LOBE PULMONARY NODULE: ICD-10-CM

## 2020-07-09 PROBLEM — R80.9 POSITIVE FOR MICROALBUMINURIA: Chronic | Status: ACTIVE | Noted: 2020-07-09

## 2020-07-09 LAB
ANION GAP SERPL CALCULATED.3IONS-SCNC: 5 MMOL/L (ref 4–13)
BASOPHILS # BLD AUTO: 0.03 THOUSANDS/ΜL (ref 0–0.1)
BASOPHILS NFR BLD AUTO: 0 % (ref 0–1)
BUN SERPL-MCNC: 30 MG/DL (ref 5–25)
CALCIUM SERPL-MCNC: 9.7 MG/DL (ref 8.3–10.1)
CHLORIDE SERPL-SCNC: 108 MMOL/L (ref 100–108)
CO2 SERPL-SCNC: 24 MMOL/L (ref 21–32)
CREAT SERPL-MCNC: 1.46 MG/DL (ref 0.6–1.3)
CREAT UR-MCNC: 207 MG/DL
EOSINOPHIL # BLD AUTO: 0.23 THOUSAND/ΜL (ref 0–0.61)
EOSINOPHIL NFR BLD AUTO: 2 % (ref 0–6)
ERYTHROCYTE [DISTWIDTH] IN BLOOD BY AUTOMATED COUNT: 13.6 % (ref 11.6–15.1)
EST. AVERAGE GLUCOSE BLD GHB EST-MCNC: 177 MG/DL
GFR SERPL CREATININE-BSD FRML MDRD: 49 ML/MIN/1.73SQ M
GLUCOSE SERPL-MCNC: 167 MG/DL (ref 65–140)
HBA1C MFR BLD: 7.8 %
HCT VFR BLD AUTO: 41.3 % (ref 36.5–49.3)
HGB BLD-MCNC: 13 G/DL (ref 12–17)
IMM GRANULOCYTES # BLD AUTO: 0.03 THOUSAND/UL (ref 0–0.2)
IMM GRANULOCYTES NFR BLD AUTO: 0 % (ref 0–2)
LYMPHOCYTES # BLD AUTO: 2.25 THOUSANDS/ΜL (ref 0.6–4.47)
LYMPHOCYTES NFR BLD AUTO: 24 % (ref 14–44)
MCH RBC QN AUTO: 31 PG (ref 26.8–34.3)
MCHC RBC AUTO-ENTMCNC: 31.5 G/DL (ref 31.4–37.4)
MCV RBC AUTO: 98 FL (ref 82–98)
MICROALBUMIN UR-MCNC: 48.4 MG/L (ref 0–20)
MICROALBUMIN/CREAT 24H UR: 23 MG/G CREATININE (ref 0–30)
MONOCYTES # BLD AUTO: 0.7 THOUSAND/ΜL (ref 0.17–1.22)
MONOCYTES NFR BLD AUTO: 7 % (ref 4–12)
NEUTROPHILS # BLD AUTO: 6.3 THOUSANDS/ΜL (ref 1.85–7.62)
NEUTS SEG NFR BLD AUTO: 67 % (ref 43–75)
NRBC BLD AUTO-RTO: 0 /100 WBCS
PLATELET # BLD AUTO: 295 THOUSANDS/UL (ref 149–390)
PMV BLD AUTO: 10.8 FL (ref 8.9–12.7)
POTASSIUM SERPL-SCNC: 4.6 MMOL/L (ref 3.5–5.3)
RBC # BLD AUTO: 4.2 MILLION/UL (ref 3.88–5.62)
SODIUM SERPL-SCNC: 137 MMOL/L (ref 136–145)
WBC # BLD AUTO: 9.54 THOUSAND/UL (ref 4.31–10.16)

## 2020-07-09 PROCEDURE — 3066F NEPHROPATHY DOC TX: CPT | Performed by: FAMILY MEDICINE

## 2020-07-09 PROCEDURE — 3061F NEG MICROALBUMINURIA REV: CPT | Performed by: FAMILY MEDICINE

## 2020-07-09 PROCEDURE — 85025 COMPLETE CBC W/AUTO DIFF WBC: CPT | Performed by: FAMILY MEDICINE

## 2020-07-09 PROCEDURE — 99214 OFFICE O/P EST MOD 30 MIN: CPT | Performed by: FAMILY MEDICINE

## 2020-07-09 PROCEDURE — 4040F PNEUMOC VAC/ADMIN/RCVD: CPT | Performed by: FAMILY MEDICINE

## 2020-07-09 PROCEDURE — 36415 COLL VENOUS BLD VENIPUNCTURE: CPT | Performed by: FAMILY MEDICINE

## 2020-07-09 PROCEDURE — 3008F BODY MASS INDEX DOCD: CPT | Performed by: FAMILY MEDICINE

## 2020-07-09 PROCEDURE — 1036F TOBACCO NON-USER: CPT | Performed by: FAMILY MEDICINE

## 2020-07-09 PROCEDURE — 1160F RVW MEDS BY RX/DR IN RCRD: CPT | Performed by: FAMILY MEDICINE

## 2020-07-09 PROCEDURE — 3051F HG A1C>EQUAL 7.0%<8.0%: CPT | Performed by: FAMILY MEDICINE

## 2020-07-09 PROCEDURE — 83036 HEMOGLOBIN GLYCOSYLATED A1C: CPT | Performed by: FAMILY MEDICINE

## 2020-07-09 PROCEDURE — 80048 BASIC METABOLIC PNL TOTAL CA: CPT | Performed by: FAMILY MEDICINE

## 2020-07-09 PROCEDURE — 3077F SYST BP >= 140 MM HG: CPT | Performed by: FAMILY MEDICINE

## 2020-07-09 PROCEDURE — 82043 UR ALBUMIN QUANTITATIVE: CPT | Performed by: FAMILY MEDICINE

## 2020-07-09 PROCEDURE — 3080F DIAST BP >= 90 MM HG: CPT | Performed by: FAMILY MEDICINE

## 2020-07-09 PROCEDURE — 82570 ASSAY OF URINE CREATININE: CPT | Performed by: FAMILY MEDICINE

## 2020-07-09 RX ORDER — SULFAMETHOXAZOLE AND TRIMETHOPRIM 800; 160 MG/1; MG/1
TABLET ORAL
COMMUNITY
Start: 2020-06-30 | End: 2020-12-16

## 2020-07-09 NOTE — PROGRESS NOTES
Assessment/Plan:    No problem-specific Assessment & Plan notes found for this encounter  Diagnoses and all orders for this visit:    Type 2 diabetes mellitus with diabetic neuropathy, unspecified whether long term insulin use ([facility] )    Atrial fibrillation with RVR ([facility] )    Essential hypertension    Benign prostatic hyperplasia with weak urinary stream    History of TIA (transient ischemic attack)    Hyperlipidemia, unspecified hyperlipidemia type    Right middle lobe pulmonary nodule    Type 2 diabetes mellitus with foot ulcer (CODE) ([facility])    Other orders  -     sulfamethoxazole-trimethoprim (BACTRIM DS) 800-160 mg per tablet; TK 1 T PO BID FOR 7 DAYS  -     mupirocin (BACTROBAN) 2 % ointment; GIULIA EXT AA D  -     econazole nitrate 1 % cream; GIULIA EXT AA BID  -     Hemoglobin A1C  -     Basic metabolic panel  -     Microalbumin / creatinine urine ratio  -     CBC and differential          Subjective:      Patient ID: Kaylan Falcon  is a 78 y o  male  PATIENT RETURNS FOR FOLLOW-UP OF CHRONIC MEDICAL CONDITIONS  NO HOSPITAL STAYS OR EMERGENCY VISITS RECENTLY  MEDS WERE REVIEWED AND NO SIDE EFFECTS  NO NEW ISSUES  UNLESS NOTED BELOW  NO NEW MEDICAL PROVIDER REPORTED  THE CHRONIC DISEASES LISTED ABOVE ARE STABLE AND UNCHANGED/ THE PLAN OF CARE FOR THOSE WILL REMAIN UNCHANGED UNLESS NOTED BELOW  Non healing area R foot       The following portions of the patient's history were reviewed and updated as appropriate: allergies, current medications, past family history, past medical history, past social history, past surgical history and problem list     Review of Systems   Constitutional: Negative for activity change and appetite change  HENT: Negative for trouble swallowing  Eyes: Negative for visual disturbance  Respiratory: Negative for cough and shortness of breath  Cardiovascular: Negative for chest pain, palpitations and leg swelling     Gastrointestinal: Negative for abdominal pain and blood in stool  Endocrine: Negative for polyuria  Genitourinary: Negative for difficulty urinating and hematuria  Skin: Negative for rash  Neurological: Negative for dizziness  Psychiatric/Behavioral: Negative for behavioral problems  Objective:  Vitals:    07/09/20 1557   BP: 140/90   BP Location: Left arm   Patient Position: Sitting   Cuff Size: Adult   Pulse: 89   Temp: (!) 97 1 °F (36 2 °C)   TempSrc: Temporal   SpO2: 97%   Weight: 92 1 kg (203 lb)   Height: 6' 4" (1 93 m)      Physical Exam   Constitutional: He appears well-developed and well-nourished  HENT:   Head: Normocephalic and atraumatic  Eyes: Conjunctivae are normal    Neck: Neck supple  No thyromegaly present  Cardiovascular: Normal rate, regular rhythm, normal heart sounds and intact distal pulses  No murmur heard  Pulmonary/Chest: Effort normal and breath sounds normal  No respiratory distress  Musculoskeletal: He exhibits no edema  Lymphadenopathy:     He has no cervical adenopathy  Skin: Skin is warm and dry  Clean ulcer R lateral foot : no cellulitis  Psychiatric: He has a normal mood and affect  His behavior is normal          Patient's chronic problems that were reviewed today are stable  Recent hospital stays reviewed  Recent labs and imaging reviewed  Recent visits to other providers reviewed  Meds reviewed and no changes made  Appropriate labs and imaging were ordered  Preventive measures appropriate for age and sex were reviewed with patient  Immunizations were updated as appropriate  Patient has an appointment tomorrow with the wound care specialist for the right heat right foot issues    We will update his labs today etcetera

## 2020-07-09 NOTE — PATIENT INSTRUCTIONS
WE RECOMMEND GETTING THE 2- DOSE SHINGLES VACCINE ([address]) FROM YOUR PHARMACY  CHECK WITH THEM ON THE COST  YOU SHOULD HAVE THIS IF OVER AGE 48, EVEN IF YOU HAVE HAD THE ORIGINAL VACCINE (ZOSTRIX)  TO AVOID COVID (CORONAVIRUS) INFECTION THE FOLLOWING ARE HELPFUL:    1  Avoid areas where there are a lot of people  Examples would be small restaurants churches small grocery stores etc   Keep 6 feet between you  2  If you must go out around people use a mask  When around people use a hand  after touching surfaces  Important areas are grocery stores, gas pumps, pharmacies, [place], etc  Remember: masks protect the "other mimi"  They are not a substitute for staying 6 feet away  3  KEEP YOUR HANDS AWAY FROM YOUR FACE  IT IS EXTREMELY DIFFICULT TO GET COVID INFECTION IF YOU DO NOT TOUCH YOUR FACE AND ARE NOT AROUND PEOPLE  3  If you wish to work in the yard or walk around the neighborhood or in a park and you are not around a lot of people it is okay to keep your mask in your pocket  Casually passing someone without a mask does not put you at risk  4  Try to avoid having people coming in and out of your home  This would include cleaning personnel delivery people unnecessary service people etc   after anyone comes into your home including family be careful to wipe all surfaces with a home

## 2020-07-10 ENCOUNTER — OFFICE VISIT (OUTPATIENT)
Dept: WOUND CARE | Facility: HOSPITAL | Age: 70
End: 2020-07-10
Payer: MEDICARE

## 2020-07-10 ENCOUNTER — TELEPHONE (OUTPATIENT)
Dept: FAMILY MEDICINE CLINIC | Facility: CLINIC | Age: 70
End: 2020-07-10

## 2020-07-10 PROCEDURE — 11042 DBRDMT SUBQ TIS 1ST 20SQCM/<: CPT

## 2020-07-10 PROCEDURE — 99213 OFFICE O/P EST LOW 20 MIN: CPT

## 2020-07-17 ENCOUNTER — APPOINTMENT (OUTPATIENT)
Dept: WOUND CARE | Facility: HOSPITAL | Age: 70
End: 2020-07-17
Payer: MEDICARE

## 2020-07-17 PROCEDURE — 11042 DBRDMT SUBQ TIS 1ST 20SQCM/<: CPT

## 2020-07-21 ENCOUNTER — APPOINTMENT (OUTPATIENT)
Dept: WOUND CARE | Facility: HOSPITAL | Age: 70
End: 2020-07-21
Payer: MEDICARE

## 2020-07-21 PROCEDURE — 29445 APPL RIGID TOT CNTC LEG CAST: CPT

## 2020-07-24 ENCOUNTER — APPOINTMENT (OUTPATIENT)
Dept: WOUND CARE | Facility: HOSPITAL | Age: 70
End: 2020-07-24
Payer: MEDICARE

## 2020-07-24 PROCEDURE — 29445 APPL RIGID TOT CNTC LEG CAST: CPT

## 2020-07-29 ENCOUNTER — TELEPHONE (OUTPATIENT)
Dept: FAMILY MEDICINE CLINIC | Facility: CLINIC | Age: 70
End: 2020-07-29

## 2020-07-31 ENCOUNTER — APPOINTMENT (OUTPATIENT)
Dept: WOUND CARE | Facility: HOSPITAL | Age: 70
End: 2020-07-31
Payer: MEDICARE

## 2020-07-31 DIAGNOSIS — E11.9 TYPE 2 DIABETES MELLITUS WITHOUT COMPLICATION, WITHOUT LONG-TERM CURRENT USE OF INSULIN (HCC): ICD-10-CM

## 2020-07-31 PROCEDURE — 29445 APPL RIGID TOT CNTC LEG CAST: CPT

## 2020-07-31 RX ORDER — GLIMEPIRIDE 4 MG/1
TABLET ORAL
Qty: 180 TABLET | Refills: 3 | Status: SHIPPED | OUTPATIENT
Start: 2020-07-31 | End: 2021-05-11

## 2020-08-07 ENCOUNTER — APPOINTMENT (OUTPATIENT)
Dept: WOUND CARE | Facility: HOSPITAL | Age: 70
End: 2020-08-07
Payer: MEDICARE

## 2020-08-07 PROCEDURE — 29445 APPL RIGID TOT CNTC LEG CAST: CPT

## 2020-08-14 ENCOUNTER — OFFICE VISIT (OUTPATIENT)
Dept: WOUND CARE | Facility: HOSPITAL | Age: 70
End: 2020-08-14
Payer: MEDICARE

## 2020-08-14 VITALS
RESPIRATION RATE: 18 BRPM | DIASTOLIC BLOOD PRESSURE: 85 MMHG | BODY MASS INDEX: 28.01 KG/M2 | TEMPERATURE: 98.2 F | WEIGHT: 230 LBS | HEART RATE: 79 BPM | SYSTOLIC BLOOD PRESSURE: 146 MMHG | HEIGHT: 76 IN

## 2020-08-14 DIAGNOSIS — L97.516 DIABETIC ULCER OF TOE OF RIGHT FOOT ASSOCIATED WITH DIABETES MELLITUS DUE TO UNDERLYING CONDITION, WITH BONE INVOLVEMENT WITHOUT EVIDENCE OF NECROSIS (HCC): Primary | ICD-10-CM

## 2020-08-14 DIAGNOSIS — E08.621 DIABETIC ULCER OF TOE OF RIGHT FOOT ASSOCIATED WITH DIABETES MELLITUS DUE TO UNDERLYING CONDITION, WITH BONE INVOLVEMENT WITHOUT EVIDENCE OF NECROSIS (HCC): Primary | ICD-10-CM

## 2020-08-14 PROBLEM — E11.621 DIABETIC ULCER OF TOE OF RIGHT FOOT ASSOCIATED WITH TYPE 2 DIABETES MELLITUS, WITH BONE INVOLVEMENT WITHOUT EVIDENCE OF NECROSIS (HCC): Status: RESOLVED | Noted: 2020-08-14 | Resolved: 2020-08-14

## 2020-08-14 PROBLEM — E11.621 DIABETIC ULCER OF TOE OF RIGHT FOOT ASSOCIATED WITH TYPE 2 DIABETES MELLITUS, WITH BONE INVOLVEMENT WITHOUT EVIDENCE OF NECROSIS (HCC): Status: ACTIVE | Noted: 2020-08-14

## 2020-08-14 PROBLEM — E11.621 TYPE 2 DIABETES MELLITUS WITH FOOT ULCER (CODE) (HCC): Status: RESOLVED | Noted: 2019-07-23 | Resolved: 2020-08-14

## 2020-08-14 PROCEDURE — 29445 APPL RIGID TOT CNTC LEG CAST: CPT | Performed by: PODIATRIST

## 2020-08-14 RX ORDER — LIDOCAINE HYDROCHLORIDE 40 MG/ML
5 SOLUTION TOPICAL ONCE
Status: COMPLETED | OUTPATIENT
Start: 2020-08-14 | End: 2020-08-14

## 2020-08-14 RX ADMIN — LIDOCAINE HYDROCHLORIDE 5 ML: 40 SOLUTION TOPICAL at 09:28

## 2020-08-14 NOTE — PATIENT INSTRUCTIONS
Orders Placed This Encounter   Procedures    Wound cleansing and dressings     Right lateral foot:   Keep dressing clean dry and intact  Applied today at wound center  Skin prep to periwound  [facility]  Optilock secured with ava and tape    TCC EZ 4" applied by DPM     Standing Status:   Future     Standing Expiration Date:   8/14/2021

## 2020-08-14 NOTE — PROGRESS NOTES
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, with bone involvement without evidence of necrosis (HCC) - Primary    Relevant Medications    lidocaine (XYLOCAINE) 4 % topical solution 5 mL (Completed)    Other Relevant Orders    Wound cleansing and dressings                Cast application    Date/Time: 8/14/2020 9:43 AM  Performed by: Mars Hernandes DPM  Authorized by: Mars Hernandes DPM     Consent:     Consent obtained:  Verbal    Consent given by:  Patient    Risks discussed:  Discoloration, numbness, pain and swelling    Alternatives discussed:  No treatment, delayed treatment, alternative treatment and observation  Pre-procedure details:     Sensation:  Numbness  Procedure details:     Laterality:  Right    Location:  LegCast type: Total contact    Supplies:  Fiberglass and cotton padding               Plan:     The plan is to continue with weekly TCC changes until the wound is fully healed  Wound 06/04/20 Foot Right;Lateral (Active)       Subjective:        Zhanna Mondragon is coming weekly for TCC changes to heal his DM foot wound  The following portions of the patient's history were reviewed and updated as appropriate: allergies, current medications, past family history, past medical history, past social history, past surgical history and problem list     Review of Systems   Constitutional: Positive for activity change   Negative for appetite change, chills, diaphoresis, fatigue, fever and unexpected weight change  Respiratory: Negative  Cardiovascular: Negative  Gastrointestinal: Negative  Musculoskeletal: Positive for gait problem  Skin: Positive for wound  Neurological: Positive for numbness  Psychiatric/Behavioral: Negative  Objective:       Wound 06/04/20 Foot Right;Lateral (Active)   Wound Description Beefy red;Slough 08/14/20 0859   Lavern-wound Assessment Maceration 08/14/20 0859   Wound Length (cm) 1 8 cm 08/14/20 0859   Wound Width (cm) 1 cm 08/14/20 0859   Wound Depth (cm) 0 2 cm 08/14/20 0859   Wound Surface Area (cm^2) 1 8 cm^2 08/14/20 0859   Wound Volume (cm^3) 0 36 cm^3 08/14/20 0859   Calculated Wound Volume (cm^3) 0 36 cm^3 08/14/20 0859   Drainage Amount Large 08/14/20 0859   Drainage Description Serosanguineous;Green 08/14/20 0859   Non-staged Wound Description Full thickness 08/14/20 0859   Dressing Status Intact 08/14/20 0859       /85   Pulse 79   Temp 98 2 °F (36 8 °C)   Resp 18   Ht 6' 4" (1 93 m)   Wt 104 kg (230 lb)   BMI 28 00 kg/m²     Physical Exam  Vitals signs and nursing note reviewed  Constitutional:       Appearance: Normal appearance  He is not toxic-appearing  Cardiovascular:      Rate and Rhythm: Normal rate  Pulmonary:      Effort: Pulmonary effort is normal    Abdominal:      General: Bowel sounds are normal    Skin:     General: Skin is warm and dry  Capillary Refill: Capillary refill takes less than 2 seconds  Neurological:      General: No focal deficit present  Mental Status: He is alert  Psychiatric:         Mood and Affect: Mood normal            Wound Instructions:  Orders Placed This Encounter   Procedures    Wound cleansing and dressings     Right lateral foot:   Keep dressing clean dry and intact  Applied today at wound center  Skin prep to periwound  Opticell ag to wound  Optilock secured with ava and tape    TCC EZ 4" applied by DPM     Standing Status:   Future     Standing Expiration Date:   8/14/2021

## 2020-08-21 ENCOUNTER — OFFICE VISIT (OUTPATIENT)
Dept: WOUND CARE | Facility: HOSPITAL | Age: 70
End: 2020-08-21
Payer: MEDICARE

## 2020-08-21 VITALS
RESPIRATION RATE: 18 BRPM | SYSTOLIC BLOOD PRESSURE: 118 MMHG | TEMPERATURE: 98.2 F | DIASTOLIC BLOOD PRESSURE: 68 MMHG | HEART RATE: 80 BPM

## 2020-08-21 DIAGNOSIS — E08.621 DIABETIC ULCER OF TOE OF RIGHT FOOT ASSOCIATED WITH DIABETES MELLITUS DUE TO UNDERLYING CONDITION, WITH BONE INVOLVEMENT WITHOUT EVIDENCE OF NECROSIS (HCC): Primary | ICD-10-CM

## 2020-08-21 DIAGNOSIS — L97.516 DIABETIC ULCER OF TOE OF RIGHT FOOT ASSOCIATED WITH DIABETES MELLITUS DUE TO UNDERLYING CONDITION, WITH BONE INVOLVEMENT WITHOUT EVIDENCE OF NECROSIS (HCC): Primary | ICD-10-CM

## 2020-08-21 PROCEDURE — NC001 PR NO CHARGE: Performed by: PODIATRIST

## 2020-08-21 PROCEDURE — 11042 DBRDMT SUBQ TIS 1ST 20SQCM/<: CPT | Performed by: PODIATRIST

## 2020-08-21 NOTE — PATIENT INSTRUCTIONS
Orders Placed This Encounter   Procedures    Wound cleansing and dressings     Right foot wound: At wound center today, washed leg with soap and water  Wound cleansed with NSS and patted dry  Skin prep to periwound  Opticel Ag to wound  Optilock and ABD secured with ava, tape    TCC EZ applied by DPM      Standing Status:   Future     Standing Expiration Date:   8/21/2021    Wound off loading     TCC EZ     Standing Status:   Future     Standing Expiration Date:   8/21/2021

## 2020-08-21 NOTE — PROGRESS NOTES
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 verbal  Consent given by: patient  Risks discussed? procedural risks discussed  Immediately prior to the procedure a time out was called  Performed by: physician  Debridement type: surgical  Level of debridement: subcutaneous tissue  Pain control: lidocaine 4%  Post-debridement measurements  Length (cm): 1 7  Width (cm): 1  Depth (cm): 0 2  Percent debrided: 100%  Surface Area (cm^2): 1 7  Area debrided (cm^2): 1 7  Volume (cm^3): 0 34  Tissue and other material debrided: subcutaneous tissue  Devitalized tissue debrided: biofilm, callus, fibrin and slough  Instrument(s) utilized: blade  Bleeding: medium  Hemostasis obtained with: pressure  Procedural pain (0-10): 0  Post-procedural pain: 0   Response to treatment: procedure was tolerated well      Cast Application    Date/Time: 8/22/2020 9:02 AM  Performed by: Namita Wilkinson DPM  Authorized by: Namita Wilkinson DPM     Consent:     Consent obtained:  Verbal    Consent given by:  Patient    Risks discussed:  Discoloration, numbness, pain and swelling    Alternatives discussed:  Delayed treatment, alternative treatment, observation and no treatment  Pre-procedure details:     Sensation:  Unchanged  Procedure details:     Laterality:  Right    Location:  Ankle    Ankle:  L ankleCast type:   Total contact  Post-procedure details:     Pain:  Unchanged    Sensation:  Unchanged Patient tolerance of procedure: Tolerated well, no immediate complications        Plan:  Continue total contact casting and offloading the plantar foot  Return to clinic next week  TCC applied today     Wound 06/04/20 Foot Right;Lateral (Active)       Subjective:        Patient has severe right foot deformity with chronic plantar ulceration under the 5th ray area  He has been using total contact cast with noted improvement in the size and the appearance of the wound  He has no acute concerns today      The following portions of the patient's history were reviewed and updated as appropriate: allergies, current medications, past family history, past medical history, past social history, past surgical history and problem list       Review of Systems   Constitutional: Negative  Objective:            /68   Pulse 80   Temp 98 2 °F (36 8 °C) (Tympanic)   Resp 18     Physical Exam  Vitals signs reviewed  Constitutional:       Appearance: He is normal weight  He is not ill-appearing or diaphoretic  Cardiovascular:      Pulses: Normal pulses  Pulmonary:      Effort: No respiratory distress  Feet:      Right foot:      Skin integrity: Ulcer (Plantar ulceration right plantar lateral midfoot with granular wound bed  There is some undermining at 6:00 a m   No sign of purulence cellulitis or necrosis  ) present  Neurological:      Mental Status: He is alert  Wound Instructions:  Orders Placed This Encounter   Procedures    Wound cleansing and dressings     Right foot wound: At wound center today, washed leg with soap and water  Wound cleansed with NSS and patted dry  Skin prep to periwound  Opticel Ag to wound  Optilock and ABD secured with ava, tape    TCC EZ applied by DPM      Standing Status:   Future     Standing Expiration Date:   8/21/2021    Wound off loading     TCC EZ     Standing Status:   Future     Standing Expiration Date:   8/21/2021    Debridement This order was created via procedure documentation Gum Bleeding - will administer NAHUN 7 as per Guidelines for patient in Hemophiliac binder - 2 doses of 5mg 2hours apart; Catarina benz

## 2020-08-22 PROCEDURE — 11042 DBRDMT SUBQ TIS 1ST 20SQCM/<: CPT | Performed by: PODIATRIST

## 2020-08-28 ENCOUNTER — OFFICE VISIT (OUTPATIENT)
Dept: WOUND CARE | Facility: HOSPITAL | Age: 70
End: 2020-08-28
Payer: MEDICARE

## 2020-08-28 VITALS
TEMPERATURE: 98.6 F | SYSTOLIC BLOOD PRESSURE: 158 MMHG | DIASTOLIC BLOOD PRESSURE: 87 MMHG | HEART RATE: 105 BPM | RESPIRATION RATE: 16 BRPM

## 2020-08-28 DIAGNOSIS — E11.40 TYPE 2 DIABETES MELLITUS WITH DIABETIC NEUROPATHY, UNSPECIFIED WHETHER LONG TERM INSULIN USE (HCC): Chronic | ICD-10-CM

## 2020-08-28 DIAGNOSIS — E08.621 DIABETIC ULCER OF TOE OF RIGHT FOOT ASSOCIATED WITH DIABETES MELLITUS DUE TO UNDERLYING CONDITION, WITH BONE INVOLVEMENT WITHOUT EVIDENCE OF NECROSIS (HCC): Primary | ICD-10-CM

## 2020-08-28 DIAGNOSIS — L97.516 DIABETIC ULCER OF TOE OF RIGHT FOOT ASSOCIATED WITH DIABETES MELLITUS DUE TO UNDERLYING CONDITION, WITH BONE INVOLVEMENT WITHOUT EVIDENCE OF NECROSIS (HCC): Primary | ICD-10-CM

## 2020-08-28 PROCEDURE — 11042 DBRDMT SUBQ TIS 1ST 20SQCM/<: CPT | Performed by: PODIATRIST

## 2020-08-28 RX ORDER — LIDOCAINE HYDROCHLORIDE 40 MG/ML
5 SOLUTION TOPICAL ONCE
Status: COMPLETED | OUTPATIENT
Start: 2020-08-28 | End: 2020-08-28

## 2020-08-28 RX ADMIN — LIDOCAINE HYDROCHLORIDE 5 ML: 40 SOLUTION TOPICAL at 11:41

## 2020-08-28 NOTE — PROGRESS NOTES
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 verbal  Consent given by: patient  Risks discussed? procedural risks discussed  Time out called at 8/28/2020 3:00 PM  Immediately prior to the procedure a time out was called  Performed by: physician  Debridement type: surgical  Level of debridement: subcutaneous tissue  Pain control: lidocaine 4%  Pre-debridement measurements  Length (cm): 0 9  Width (cm): 1 3  Depth (cm): 0 4  Surface Area (cm^2): 1 17  Volume (cm^3): 0 47    Post-debridement measurements  Length (cm): 0 9  Width (cm): 1 3  Depth (cm): 0 4  Percent debrided: 100%  Surface Area (cm^2): 1 17  Area debrided (cm^2): 1 17  Volume (cm^3): 0 47  Tissue and other material debrided: subcutaneous tissue  Devitalized tissue debrided: callus and fibrin  Instrument(s) utilized: blade  Bleeding: small  Hemostasis obtained with: not applicable  Procedural pain (0-10): insensate  Post-procedural pain: insensate   Response to treatment: procedure was tolerated well      In addition, a total contact cast as applied       I recommended continue applications of the TCC to facilitate healing particularly in light of the failure to heal previously without TCC  After application of the primary dressing, a TCC was applied in a modified Carville method using stockinette, cast padding between the toes, foam over the toes, cast padding toes to knee, additional felt over the [facility] and tibial crest and finally with the foot at a 90 degree angle to the leg, a well-formed fiberglass shell with additional thickness added plantarly to avoid breakdown  A cast shoe was applied for traction  Strict instructions to keep the cast clean,dry and intact were given and also instructions not to introduce powders or foreign objects into the cast was given  Instructions were given to call if any cast breakdown, or any irritation, odor, drainage or pain was noted within the cast      I explained that good wound care and compliance are necessary to allow healing and to prevent toe loss or limb loss  No guarantees of wound healing were given and I explained clearly that there is some risk with the use of TCC such as cast "rub", cast ulcers, or hidden infection but that the benefits of the TCC far outweight the risks if patient compliance is in place  Plan:     The plan is to continue weekly TCC until healed  Wound 06/04/20 Foot Right;Lateral (Active)       Subjective:        Berta Deleon sees me here weekly for total contact cast changes  The following portions of the patient's history were reviewed and updated as appropriate: allergies, current medications, past family history, past medical history, past social history, past surgical history and problem list     Review of Systems   Constitutional: Negative  Skin: Positive for wound           Objective:       Wound 06/04/20 Foot Right;Lateral (Active)   Wound Image Images linked 08/28/20 1133   Wound Description Granulation tissue;Pink;Slough 08/28/20 1133   Lavern-wound Assessment Maceration 08/28/20 1133   Wound Length (cm) 0 9 cm 08/28/20 1133   Wound Width (cm) 1 3 cm 08/28/20 1133 Wound Depth (cm) 0 4 cm 08/28/20 1133   Wound Surface Area (cm^2) 1 17 cm^2 08/28/20 1133   Wound Volume (cm^3) 0 47 cm^3 08/28/20 1133   Calculated Wound Volume (cm^3) 0 47 cm^3 08/28/20 1133   Change in Wound Size % -30 56 08/28/20 1133   Undermining 0 3 08/28/20 1133   Undermining is depth extending from 1-3 08/28/20 1133   Drainage Amount Moderate 08/28/20 1133   Drainage Description Serosanguineous 08/28/20 1133   Non-staged Wound Description Full thickness 08/28/20 1133   Dressing Status Intact 08/28/20 1133       /87   Pulse 105   Temp 98 6 °F (37 °C)   Resp 16     Physical Exam  Vitals signs and nursing note reviewed  Constitutional:       General: He is not in acute distress  Appearance: Normal appearance  He is normal weight  He is not ill-appearing, toxic-appearing or diaphoretic  Neurological:      Mental Status: He is alert  Wound Instructions:  No orders of the defined types were placed in this encounter

## 2020-08-28 NOTE — PATIENT INSTRUCTIONS
Orders Placed This Encounter   Procedures    Wound cleansing and dressings     Right foot    Wash your hands with soap and water  Remove old dressing, discard into plastic bag and place in trash  Cleanse the wound with soap and water prior to applying a clean dressing  Do not use tissue or cotton balls  Do not scrub the wound  Pat dry using gauze  Shower with cast cover   Apply Opticel Ag to the wound  Cover with Optilock and hypafix tape  Change dressing with TCC change  Standing Status:   Future     Standing Expiration Date:   8/28/2021    Wound off loading     Off-loading Instructions: Right foot    Total Contact Cast Instructions: Do not get cast wet  Contact wound center if there is a foul odor or becomes uncomfortable due to feeling tight or swelling  Do not use objects down inside of cast to scratch  Do not walk on cast without walking boot  and Wear off-loading device as directed by your physician  Standing Status:   Future     Standing Expiration Date:   8/28/2021     Debridement   WHAT YOU NEED TO KNOW:   Debridement is the removal of infected, damaged, or dead tissue so a wound can heal properly  You may need more than one debridement  DISCHARGE INSTRUCTIONS:   Medicines:   · Medicines  can help decrease pain or prevent or treat an infection  · Take your medicine as directed  Contact your healthcare provider if you think your medicine is not helping or if you have side effects  Tell him of her if you are allergic to any medicine  Keep a list of the medicines, vitamins, and herbs you take  Include the amounts, and when and why you take them  Bring the list or the pill bottles to follow-up visits  Carry your medicine list with you in case of an emergency  Follow up with your healthcare provider as directed: You may need to return to have your wound checked  Write down your questions so you remember to ask them during your visits     Care for your wound as directed:   · Keep your wound clean and dry  You may need to cover your wound when you bathe  · Limit movements,  such as stretching, to prevent bleeding, tearing, and swelling in your wound  · Protect your wound  Avoid sunlight for at least 6 months  Apply mild, unscented lotion or cream to the skin around your wound to keep it moist     · Do not smoke  If you smoke, it is never too late to quit  Smoking decreases blood flow to the wound and delays healing  Ask for information if you need help quitting  · Drink liquids as directed  Ask how much liquid to drink each day and which liquids are best for you  Liquids help keep your skin moist so your wound can heal      · Eat a variety of healthy foods  Foods rich in protein, such as meat, eggs, and dairy products, help repair tissue  Carbohydrate-rich foods, such as bread and cereals, help increase cell growth and decrease the risk for wound infection  Do not have caffeine  Ask if you should take vitamins  Vitamins A and C may help tissue formation and increase scar tissue strength  Contact your healthcare provider if:   · You have a fever  · Your pain gets worse or does not go away, even after treatment  · Your skin is red, swollen, or draining pus  · You have questions or concerns about your condition or care  Return to the emergency department if:   · Blood soaks through your bandage  · You have severe pain  © 2017 [employer] Information is for End User's use only and may not be sold, redistributed or otherwise used for commercial purposes  All illustrations and images included in CareNotes® are the copyrighted property of [employer]  or Gregory Ingram  The above information is an  only  It is not intended as medical advice for individual conditions or treatments  Talk to your doctor, nurse or pharmacist before following any medical regimen to see if it is safe and effective for you

## 2020-09-02 ENCOUNTER — TREATMENT (OUTPATIENT)
Dept: FAMILY MEDICINE CLINIC | Facility: CLINIC | Age: 70
End: 2020-09-02

## 2020-09-02 DIAGNOSIS — E11.40 TYPE 2 DIABETES MELLITUS WITH DIABETIC NEUROPATHY, UNSPECIFIED WHETHER LONG TERM INSULIN USE (HCC): Chronic | ICD-10-CM

## 2020-09-02 RX ORDER — SITAGLIPTIN 100 MG/1
100 TABLET, FILM COATED ORAL DAILY
Qty: 90 TABLET | Refills: 3 | Status: SHIPPED | OUTPATIENT
Start: 2020-09-02 | End: 2020-12-16

## 2020-09-04 ENCOUNTER — OFFICE VISIT (OUTPATIENT)
Dept: WOUND CARE | Facility: HOSPITAL | Age: 70
End: 2020-09-04
Payer: MEDICARE

## 2020-09-04 VITALS
DIASTOLIC BLOOD PRESSURE: 72 MMHG | RESPIRATION RATE: 18 BRPM | HEART RATE: 93 BPM | TEMPERATURE: 97.2 F | SYSTOLIC BLOOD PRESSURE: 116 MMHG

## 2020-09-04 DIAGNOSIS — E08.621 DIABETIC ULCER OF TOE OF RIGHT FOOT ASSOCIATED WITH DIABETES MELLITUS DUE TO UNDERLYING CONDITION, WITH BONE INVOLVEMENT WITHOUT EVIDENCE OF NECROSIS (HCC): Primary | ICD-10-CM

## 2020-09-04 DIAGNOSIS — L97.516 DIABETIC ULCER OF TOE OF RIGHT FOOT ASSOCIATED WITH DIABETES MELLITUS DUE TO UNDERLYING CONDITION, WITH BONE INVOLVEMENT WITHOUT EVIDENCE OF NECROSIS (HCC): Primary | ICD-10-CM

## 2020-09-04 DIAGNOSIS — E11.40 TYPE 2 DIABETES MELLITUS WITH DIABETIC NEUROPATHY, UNSPECIFIED WHETHER LONG TERM INSULIN USE (HCC): Chronic | ICD-10-CM

## 2020-09-04 PROCEDURE — 29445 APPL RIGID TOT CNTC LEG CAST: CPT | Performed by: PODIATRIST

## 2020-09-04 PROCEDURE — 99213 OFFICE O/P EST LOW 20 MIN: CPT | Performed by: PODIATRIST

## 2020-09-04 NOTE — PROGRESS NOTES
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 ([facility] )  -     Wound cleansing and dressings; Future    Type 2 diabetes mellitus with diabetic neuropathy, unspecified whether long term insulin use (HCC)            Cast Application    Date/Time: 9/4/2020 10:12 AM  Performed by: Mary Kay Fletcher DPM  Authorized by: Mary Kay Fletcher DPM     Consent:     Consent obtained:  Verbal    Consent given by:  Patient    Risks discussed:  Discoloration, numbness, pain and swelling    Alternatives discussed:  No treatment, delayed treatment, alternative treatment and observation  Pre-procedure details:     Sensation:  Numbness  Procedure details:     Laterality:  Right    Location:  LegCast type: Total contact    Supplies:  Fiberglass and cotton padding        Plan:    I recommended continue applications of the TCC to facilitate healing particularly in light of the failure to heal previously without TCC  After application of the primary dressing, a TCC was applied in a modified Carville method using stockinette, cast padding between the toes, foam over the toes, cast padding toes to knee, additional felt over the [facility] and tibial crest and finally with the foot at a 90 degree angle to the leg, a well-formed fiberglass shell with additional thickness added plantarly to avoid breakdown  A cast shoe was applied for traction         Strict instructions to keep the cast clean,dry and intact were given and also instructions not to introduce powders or foreign objects into the cast was given  Instructions were given to call if any cast breakdown, or any irritation, odor, drainage or pain was noted within the cast      I explained that good wound care and compliance are necessary to allow healing and to prevent toe loss or limb loss  No guarantees of wound healing were given and I explained clearly that there is some risk with the use of TCC such as cast "rub", cast ulcers, or hidden infection but that the benefits of the TCC far outweight the risks if patient compliance is in place  Wound 06/04/20 Foot Right;Lateral (Active)   Wound Image Images linked 09/04/20 0919   Wound Description Granulation tissue;Slough; Yellow 09/04/20 0921   Lavren-wound Assessment Maceration; Other (Comment) (Callus) 09/04/20 0921   Wound Length (cm) 1 3 cm 09/04/20 0921   Wound Width (cm) 0 6 cm 09/04/20 0921   Wound Depth (cm) 0 4 cm 09/04/20 0921   Wound Surface Area (cm^2) 0 78 cm^2 09/04/20 0921   Wound Volume (cm^3) 0 31 cm^3 09/04/20 0921   Calculated Wound Volume (cm^3) 0 31 cm^3 09/04/20 0921   Change in Wound Size % 13 89 09/04/20 0921   Undermining 0 6 09/04/20 0921   Undermining is depth extending from 11-4 o 'Clock 09/04/20 0921   Drainage Amount Moderate 09/04/20 0921   Drainage Description Serosanguineous 09/04/20 0921   Non-staged Wound Description Full thickness 09/04/20 0921       Subjective:        Kaela Hough comes in regularly for TCC changes  He is tolerating them well  He uses crutches for PWB  The following portions of the patient's history were reviewed and updated as appropriate: allergies, current medications, past family history, past medical history, past social history, past surgical history and problem list     Review of Systems   Constitutional: Negative  Skin: Positive for wound           Objective:       Wound 06/04/20 Foot Right;Lateral (Active)   Wound Image Images linked 09/04/20 0919   Wound Description Granulation tissue;Slough; Yellow 09/04/20 0921   Lavern-wound Assessment Maceration; Other (Comment) (Callus) 09/04/20 0921   Wound Length (cm) 1 3 cm 09/04/20 0921   Wound Width (cm) 0 6 cm 09/04/20 0921   Wound Depth (cm) 0 4 cm 09/04/20 0921   Wound Surface Area (cm^2) 0 78 cm^2 09/04/20 0921   Wound Volume (cm^3) 0 31 cm^3 09/04/20 0921   Calculated Wound Volume (cm^3) 0 31 cm^3 09/04/20 0921   Change in Wound Size % 13 89 09/04/20 0921   Undermining 0 6 09/04/20 0921   Undermining is depth extending from 11-4 o 'Clock 09/04/20 0921   Drainage Amount Moderate 09/04/20 0921   Drainage Description Serosanguineous 09/04/20 0921   Non-staged Wound Description Full thickness 09/04/20 0921       /72   Pulse 93   Temp (!) 97 2 °F (36 2 °C)   Resp 18     Physical Exam  Vitals signs and nursing note reviewed  Constitutional:       General: He is not in acute distress  Appearance: Normal appearance  He is normal weight  He is not ill-appearing, toxic-appearing or diaphoretic  Neurological:      Mental Status: He is alert  Wound Instructions:  Orders Placed This Encounter   Procedures    Wound cleansing and dressings     Right Foot wound  Wash your hands with soap and water  Remove old dressing, discard into plastic bag and place in trash  Cleanse the wound with NSS prior to applying a clean dressing  Do not use tissue or cotton balls  Do not scrub the wound  Pat dry using gauze  Shower no   Apply Skin prep to skin surrounding wound  Apply Opticell AG to the wound  Cover with Optilock then ABD Pad  Secure with Kacy and tape then TCC   Change dressing Weekly at the [facility]    Off-loading Instructions: Total Contact Cast Instructions: Do not get cast wet  Contact wound center if there is a foul odor or becomes uncomfortable due to feeling tight or swelling  Do not use objects down inside of cast to scratch  Do not walk on cast without walking boot      Treatments above completed today at the [facility]     Standing Status:   Future     Standing Expiration Date:   9/4/2021

## 2020-09-04 NOTE — PATIENT INSTRUCTIONS
Orders Placed This Encounter   Procedures    Wound cleansing and dressings     Right Foot wound  Wash your hands with soap and water  Remove old dressing, discard into plastic bag and place in trash  Cleanse the wound with NSS prior to applying a clean dressing  Do not use tissue or cotton balls  Do not scrub the wound  Pat dry using gauze  Shower no   Apply Skin prep to skin surrounding wound  Apply Opticell AG to the wound  Cover with Optilock then ABD Pad  Secure with Kacy and tape then TCC   Change dressing Weekly at the [facility]    Off-loading Instructions: Total Contact Cast Instructions: Do not get cast wet  Contact wound center if there is a foul odor or becomes uncomfortable due to feeling tight or swelling  Do not use objects down inside of cast to scratch  Do not walk on cast without walking boot      Treatments above completed today at the [facility]     Standing Status:   Future     Standing Expiration Date:   9/4/2021

## 2020-09-11 ENCOUNTER — OFFICE VISIT (OUTPATIENT)
Dept: WOUND CARE | Facility: HOSPITAL | Age: 70
End: 2020-09-11
Payer: MEDICARE

## 2020-09-11 VITALS
DIASTOLIC BLOOD PRESSURE: 87 MMHG | SYSTOLIC BLOOD PRESSURE: 137 MMHG | TEMPERATURE: 96.5 F | RESPIRATION RATE: 18 BRPM | HEART RATE: 94 BPM

## 2020-09-11 DIAGNOSIS — L97.516 DIABETIC ULCER OF TOE OF RIGHT FOOT ASSOCIATED WITH DIABETES MELLITUS DUE TO UNDERLYING CONDITION, WITH BONE INVOLVEMENT WITHOUT EVIDENCE OF NECROSIS (HCC): Primary | ICD-10-CM

## 2020-09-11 DIAGNOSIS — E11.40 TYPE 2 DIABETES MELLITUS WITH DIABETIC NEUROPATHY, UNSPECIFIED WHETHER LONG TERM INSULIN USE (HCC): Chronic | ICD-10-CM

## 2020-09-11 DIAGNOSIS — E08.621 DIABETIC ULCER OF TOE OF RIGHT FOOT ASSOCIATED WITH DIABETES MELLITUS DUE TO UNDERLYING CONDITION, WITH BONE INVOLVEMENT WITHOUT EVIDENCE OF NECROSIS (HCC): Primary | ICD-10-CM

## 2020-09-11 PROCEDURE — 29445 APPL RIGID TOT CNTC LEG CAST: CPT | Performed by: PODIATRIST

## 2020-09-11 RX ORDER — LIDOCAINE HYDROCHLORIDE 40 MG/ML
5 SOLUTION TOPICAL ONCE
Status: COMPLETED | OUTPATIENT
Start: 2020-09-11 | End: 2020-09-11

## 2020-09-11 RX ADMIN — LIDOCAINE HYDROCHLORIDE 5 ML: 40 SOLUTION TOPICAL at 08:45

## 2020-09-11 NOTE — PATIENT INSTRUCTIONS
Orders Placed This Encounter   Procedures    Wound cleansing and dressings     Right Foot wound  Wash your hands with soap and water  Remove old dressing, discard into plastic bag and place in trash  Cleanse the wound with NSS prior to applying a clean dressing  Do not use tissue or cotton balls  Do not scrub the wound  Pat dry using gauze  Shower no   Apply Skin prep to skin surrounding wound  Apply Opticell AG to the wound  Cover with Optilock then ABD Pad  Secure with Kacy and tape then TCC   Change dressing Weekly at the [facility]     Off-loading Instructions:     Total Contact Cast Instructions: Do not get cast wet  Contact wound center if there is a foul odor or becomes uncomfortable due to feeling tight or swelling  Do not use objects down inside of cast to scratch   Do not walk on cast without walking boot      Treatments above completed today at the [facility]     Standing Status:   Future     Standing Expiration Date:   9/11/2021

## 2020-09-11 NOTE — PROGRESS NOTES
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 (HCC)  -     lidocaine (XYLOCAINE) 4 % topical solution 5 mL  -     Wound cleansing and dressings; Future    Type 2 diabetes mellitus with diabetic neuropathy, unspecified whether long term insulin use (HCC)              Cast Application    Date/Time: 9/11/2020 9:22 AM  Performed by: Cj Ford DPM  Authorized by: Cj Ford DPM     Consent:     Consent obtained:  Verbal    Consent given by:  Patient    Risks discussed:  Discoloration, numbness, pain and swelling    Alternatives discussed:  No treatment, delayed treatment, alternative treatment and observation  Pre-procedure details:     Sensation:  Numbness  Procedure details:     Laterality:  Right    Location:  LegCast type: Total contact    Supplies:  Fiberglass and cotton padding        Plan:     The plan is to continue weekly total contact cast changes  Wound 06/04/20 Foot Right;Lateral (Active)   Wound Image Images linked 09/11/20 0835   Wound Description Granulation tissue;Slough; Yellow 09/11/20 0839   Lavern-wound Assessment Maceration; Other (Comment) 09/11/20 0839   Wound Length (cm) 1 2 cm 09/11/20 0839   Wound Width (cm) 0 4 cm 09/11/20 0839   Wound Depth (cm) 0 6 cm 09/11/20 0839   Wound Surface Area (cm^2) 0 48 cm^2 09/11/20 0839   Wound Volume (cm^3) 0 29 cm^3 09/11/20 0839   Calculated Wound Volume (cm^3) 0 29 cm^3 09/11/20 0839   Change in Wound Size % 19 44 09/11/20 9766   Undermining 0 8 09/11/20 0839   Undermining is depth extending from 10-4 09/11/20 0839   Drainage Amount Moderate 09/11/20 0839   Drainage Description Kat Newer; Foul smelling 09/11/20 0839   Non-staged Wound Description Full thickness 09/11/20 0839       Subjective:        Mega Felix comes in regularly for TCC changes  He is tolerating them well  He uses crutches for PWB  The following portions of the patient's history were reviewed and updated as appropriate: allergies, current medications, past family history, past medical history, past social history, past surgical history and problem list     Review of Systems   Constitutional: Negative  Skin: Positive for wound  Objective:       Wound 06/04/20 Foot Right;Lateral (Active)   Wound Image Images linked 09/11/20 0835   Wound Description Granulation tissue;Slough; Yellow 09/11/20 0839   Lavern-wound Assessment Maceration; Other (Comment) 09/11/20 0839   Wound Length (cm) 1 2 cm 09/11/20 0839   Wound Width (cm) 0 4 cm 09/11/20 0839   Wound Depth (cm) 0 6 cm 09/11/20 0839   Wound Surface Area (cm^2) 0 48 cm^2 09/11/20 0839   Wound Volume (cm^3) 0 29 cm^3 09/11/20 0839   Calculated Wound Volume (cm^3) 0 29 cm^3 09/11/20 0839   Change in Wound Size % 19 44 09/11/20 0839   Undermining 0 8 09/11/20 0839   Undermining is depth extending from 10-4 09/11/20 0839   Drainage Amount Moderate 09/11/20 0839   Drainage Description Kat Newer; Foul smelling 09/11/20 0839   Non-staged Wound Description Full thickness 09/11/20 0839       /87   Pulse 94   Temp (!) 96 5 °F (35 8 °C)   Resp 18     Physical Exam  Vitals signs and nursing note reviewed  Constitutional:       General: He is not in acute distress  Appearance: Normal appearance  He is normal weight  He is not ill-appearing, toxic-appearing or diaphoretic  Neurological:      Mental Status: He is alert             Wound Instructions:  Orders Placed This Encounter   Procedures    Wound cleansing and dressings     Right Foot wound  Wash your hands with soap and water  Remove old dressing, discard into plastic bag and place in trash  Cleanse the wound with NSS prior to applying a clean dressing  Do not use tissue or cotton balls  Do not scrub the wound  Pat dry using gauze  Shower no   Apply Skin prep to skin surrounding wound  Apply Opticell AG to the wound  Cover with Optilock then ABD Pad  Secure with Kacy and tape then TCC   Change dressing Weekly at the [facility]     Off-loading Instructions:     Total Contact Cast Instructions: Do not get cast wet  Contact wound center if there is a foul odor or becomes uncomfortable due to feeling tight or swelling  Do not use objects down inside of cast to scratch   Do not walk on cast without walking boot      Treatments above completed today at the [facility]     Standing Status:   Future     Standing Expiration Date:   9/11/2021

## 2020-09-18 ENCOUNTER — OFFICE VISIT (OUTPATIENT)
Dept: WOUND CARE | Facility: HOSPITAL | Age: 70
End: 2020-09-18
Payer: MEDICARE

## 2020-09-18 VITALS
HEIGHT: 76 IN | SYSTOLIC BLOOD PRESSURE: 132 MMHG | RESPIRATION RATE: 16 BRPM | BODY MASS INDEX: 28.01 KG/M2 | WEIGHT: 230 LBS | TEMPERATURE: 97.9 F | DIASTOLIC BLOOD PRESSURE: 88 MMHG | HEART RATE: 88 BPM

## 2020-09-18 DIAGNOSIS — L97.516 DIABETIC ULCER OF TOE OF RIGHT FOOT ASSOCIATED WITH DIABETES MELLITUS DUE TO UNDERLYING CONDITION, WITH BONE INVOLVEMENT WITHOUT EVIDENCE OF NECROSIS (HCC): Primary | ICD-10-CM

## 2020-09-18 DIAGNOSIS — E08.621 DIABETIC ULCER OF TOE OF RIGHT FOOT ASSOCIATED WITH DIABETES MELLITUS DUE TO UNDERLYING CONDITION, WITH BONE INVOLVEMENT WITHOUT EVIDENCE OF NECROSIS (HCC): Primary | ICD-10-CM

## 2020-09-18 DIAGNOSIS — E11.40 TYPE 2 DIABETES MELLITUS WITH DIABETIC NEUROPATHY, UNSPECIFIED WHETHER LONG TERM INSULIN USE (HCC): ICD-10-CM

## 2020-09-18 PROCEDURE — 29445 APPL RIGID TOT CNTC LEG CAST: CPT | Performed by: PODIATRIST

## 2020-09-18 RX ORDER — CEPHALEXIN 500 MG/1
500 CAPSULE ORAL EVERY 6 HOURS SCHEDULED
Qty: 28 CAPSULE | Refills: 0 | Status: SHIPPED | OUTPATIENT
Start: 2020-09-18 | End: 2020-09-25

## 2020-09-18 NOTE — PROGRESS NOTES
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 ([address] )  -     Wound cleansing and dressings; Future  -     cephalexin (KEFLEX) 500 mg capsule; Take 1 capsule (500 mg total) by mouth every 6 (six) hours for 7 days    Type 2 diabetes mellitus with diabetic neuropathy, unspecified whether long term insulin use (HCC)  -     Wound cleansing and dressings; Future              Cast Application    Date/Time: 9/18/2020 10:36 AM  Performed by: Tomas Selby DPM  Authorized by: Tomas Selby DPM     Consent:     Consent obtained:  Verbal    Consent given by:  Patient    Risks discussed:  Discoloration, numbness, pain and swelling    Alternatives discussed:  No treatment, delayed treatment, alternative treatment and observation  Pre-procedure details:     Sensation:  Numbness  Procedure details:     Laterality:  Right    Location:  LegCast type: Total contact    Supplies:  Fiberglass and cotton padding            I recommended continue applications of the TCC to facilitate healing particularly in light of the failure to heal previously without TCC       After application of the primary dressing, a TCC was applied in a modified Carville method using stockinette, cast padding between the toes, foam over the toes, cast padding toes to knee, additional felt over the [facility] and tibial crest and finally with the foot at a 90 degree angle to the leg, a well-formed fiberglass shell with additional thickness added plantarly to avoid breakdown  A cast shoe was applied for traction  Strict instructions to keep the cast clean,dry and intact were given and also instructions not to introduce powders or foreign objects into the cast was given  Instructions were given to call if any cast breakdown, or any irritation, odor, drainage or pain was noted within the cast      I explained that good wound care and compliance are necessary to allow healing and to prevent toe loss or limb loss  No guarantees of wound healing were given and I explained clearly that there is some risk with the use of TCC such as cast "rub", cast ulcers, or hidden infection but that the benefits of the TCC far outweight the risks if patient compliance is in place  Wound 06/04/20 Foot Right;Lateral (Active)   Wound Image Images linked 09/18/20 0950   Wound Description Granulation tissue 09/18/20 0950   Lavern-wound Assessment Maceration (callus) 09/18/20 0950   Wound Length (cm) 1 5 cm 09/18/20 0950   Wound Width (cm) 0 7 cm 09/18/20 0950   Wound Depth (cm) 0 5 cm 09/18/20 0950   Wound Surface Area (cm^2) 1 05 cm^2 09/18/20 0950   Wound Volume (cm^3) 0 52 cm^3 09/18/20 0950   Calculated Wound Volume (cm^3) 0 52 cm^3 09/18/20 0950   Change in Wound Size % -44 44 09/18/20 0950   Undermining 0 9 09/18/20 0950   Undermining is depth extending from 1:00-3:00 09/18/20 0950   Drainage Amount Moderate 09/18/20 0950   Drainage Description Serosanguineous 09/18/20 0950   Non-staged Wound Description Full thickness 09/18/20 0950       Subjective:        Maikol Del Valle comes in regularly for TCC changes  He is tolerating them well  He uses crutches for PWB  He notes some tingling in the foot near the wound which is new        The following portions of the patient's history were reviewed and updated as appropriate: allergies, current medications, past family history, past medical history, past social history, past surgical history and problem list     Review of Systems   Constitutional: Negative  Skin: Positive for wound  Objective:       Wound 06/04/20 Foot Right;Lateral (Active)   Wound Image Images linked 09/18/20 0950   Wound Description Granulation tissue 09/18/20 0950   Lavern-wound Assessment Maceration (callus) 09/18/20 0950   Wound Length (cm) 1 5 cm 09/18/20 0950   Wound Width (cm) 0 7 cm 09/18/20 0950   Wound Depth (cm) 0 5 cm 09/18/20 0950   Wound Surface Area (cm^2) 1 05 cm^2 09/18/20 0950   Wound Volume (cm^3) 0 52 cm^3 09/18/20 0950   Calculated Wound Volume (cm^3) 0 52 cm^3 09/18/20 0950   Change in Wound Size % -44 44 09/18/20 0950   Undermining 0 9 09/18/20 0950   Undermining is depth extending from 1:00-3:00 09/18/20 0950   Drainage Amount Moderate 09/18/20 0950   Drainage Description Serosanguineous 09/18/20 0950   Non-staged Wound Description Full thickness 09/18/20 0950       /88   Pulse 88   Temp 97 9 °F (36 6 °C)   Resp 16   Ht 6' 4" (1 93 m)   Wt 104 kg (230 lb)   BMI 28 00 kg/m²     Physical Exam  Vitals signs and nursing note reviewed  Constitutional:       General: He is not in acute distress  Appearance: Normal appearance  He is normal weight  He is not ill-appearing, toxic-appearing or diaphoretic  Neurological:      Mental Status: He is alert  Wound Instructions:  Orders Placed This Encounter   Procedures    Wound cleansing and dressings     Right Foot wound  Wash your hands with soap and water  Remove old dressing, discard into plastic bag and place in trash  Cleanse the wound with NSS prior to applying a clean dressing  Do not use tissue or cotton balls  Do not scrub the wound  Pat dry using gauze  Shower no   Apply Skin prep to skin surrounding wound  Apply Opticell AG to the wound  Cover with Optilock and foam then ABD Pad  Secure with Kacy and tape then TCC   Change dressing Weekly at the [facility]     Off-loading Instructions:     Total Contact Cast Instructions: Do not get cast wet   Contact wound center if there is a foul odor or becomes uncomfortable due to feeling tight or swelling  Do not use objects down inside of cast to scratch   Do not walk on cast without walking boot      Treatments above completed today at the [facility]     Standing Status:   Future     Standing Expiration Date:   9/18/2021

## 2020-09-18 NOTE — PATIENT INSTRUCTIONS
Orders Placed This Encounter   Procedures    Wound cleansing and dressings     Right Foot wound  Wash your hands with soap and water  Remove old dressing, discard into plastic bag and place in trash  Cleanse the wound with NSS prior to applying a clean dressing  Do not use tissue or cotton balls  Do not scrub the wound  Pat dry using gauze  Shower no   Apply Skin prep to skin surrounding wound  Apply Opticell AG to the wound  Cover with Optilock and foam then ABD Pad  Secure with Kacy and tape then TCC   Change dressing Weekly at the [facility]     Off-loading Instructions:     Total Contact Cast Instructions: Do not get cast wet  Contact wound center if there is a foul odor or becomes uncomfortable due to feeling tight or swelling  Do not use objects down inside of cast to scratch   Do not walk on cast without walking boot      Treatments above completed today at the [facility]     Standing Status:   Future     Standing Expiration Date:   9/18/2021

## 2020-09-25 ENCOUNTER — OFFICE VISIT (OUTPATIENT)
Dept: WOUND CARE | Facility: HOSPITAL | Age: 70
End: 2020-09-25
Payer: MEDICARE

## 2020-09-25 VITALS
SYSTOLIC BLOOD PRESSURE: 141 MMHG | TEMPERATURE: 98 F | DIASTOLIC BLOOD PRESSURE: 78 MMHG | HEART RATE: 84 BPM | RESPIRATION RATE: 16 BRPM

## 2020-09-25 DIAGNOSIS — E08.621 DIABETIC ULCER OF TOE OF RIGHT FOOT ASSOCIATED WITH DIABETES MELLITUS DUE TO UNDERLYING CONDITION, WITH BONE INVOLVEMENT WITHOUT EVIDENCE OF NECROSIS (HCC): Primary | ICD-10-CM

## 2020-09-25 DIAGNOSIS — L97.516 DIABETIC ULCER OF TOE OF RIGHT FOOT ASSOCIATED WITH DIABETES MELLITUS DUE TO UNDERLYING CONDITION, WITH BONE INVOLVEMENT WITHOUT EVIDENCE OF NECROSIS (HCC): Primary | ICD-10-CM

## 2020-09-25 PROCEDURE — 29445 APPL RIGID TOT CNTC LEG CAST: CPT | Performed by: PODIATRIST

## 2020-09-25 RX ORDER — LIDOCAINE HYDROCHLORIDE 40 MG/ML
5 SOLUTION TOPICAL ONCE
Status: COMPLETED | OUTPATIENT
Start: 2020-09-25 | End: 2020-09-25

## 2020-09-25 RX ADMIN — LIDOCAINE HYDROCHLORIDE 5 ML: 40 SOLUTION TOPICAL at 09:32

## 2020-09-25 NOTE — PROGRESS NOTES
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 (HCC)  -     lidocaine (XYLOCAINE) 4 % topical solution 5 mL  -     Wound cleansing and dressings; Future  -     Wound off loading; Future              Cast Application    Date/Time: 9/25/2020 10:18 AM  Performed by: Flower Hidalgo DPM  Authorized by: Flower Hidalgo DPM     Consent:     Consent obtained:  Verbal    Consent given by:  Patient    Risks discussed:  Discoloration, numbness, pain and swelling    Alternatives discussed:  Delayed treatment, no treatment, alternative treatment and observation  Pre-procedure details:     Sensation:  Numbness  Procedure details:     Laterality:  Right    Location:  LegCast type: Total contact    Supplies:  Fiberglass and cotton padding        Plan:     The plan is to continue TCC         Wound 06/04/20 Foot Right;Lateral (Active)   Wound Image Images linked 09/25/20 0940   Wound Description [facility] 09/25/20 0928   Lavern-wound Assessment Maceration;Denuded 09/25/20 0928   Wound Length (cm) 1 cm 09/25/20 0928   Wound Width (cm) 1 cm 09/25/20 0928   Wound Depth (cm) 0 5 cm 09/25/20 0928   Wound Surface Area (cm^2) 1 cm^2 09/25/20 0928   Wound Volume (cm^3) 0 5 cm^3 09/25/20 0928   Calculated Wound Volume (cm^3) 0 5 cm^3 09/25/20 0928   Change in Wound Size % -38 89 09/25/20 0928   Undermining 0 4 09/25/20 0928   Undermining is depth extending from 1-3 09/25/20 0928   Drainage Amount Large 09/25/20 0928   Drainage Description Yellow 09/25/20 0928   Non-staged Wound Description Full thickness 09/25/20 0928   Dressing Status -- (TCC intact) 09/25/20 0928       Subjective:        Marquita Rock comes in regularly for TCC changes  He is tolerating them well  He uses crutches for PWB  The following portions of the patient's history were reviewed and updated as appropriate: allergies, current medications, past family history, past medical history, past social history, past surgical history and problem list     Review of Systems   Constitutional: Negative  Skin: Positive for wound  Objective:       Wound 06/04/20 Foot Right;Lateral (Active)   Wound Image Images linked 09/25/20 0940   Wound Description [facility] 09/25/20 0928   Lavern-wound Assessment Maceration;Denuded 09/25/20 0928   Wound Length (cm) 1 cm 09/25/20 0928   Wound Width (cm) 1 cm 09/25/20 0928   Wound Depth (cm) 0 5 cm 09/25/20 0928   Wound Surface Area (cm^2) 1 cm^2 09/25/20 0928   Wound Volume (cm^3) 0 5 cm^3 09/25/20 0928   Calculated Wound Volume (cm^3) 0 5 cm^3 09/25/20 0928   Change in Wound Size % -38 89 09/25/20 0928   Undermining 0 4 09/25/20 0928   Undermining is depth extending from 1-3 09/25/20 0928   Drainage Amount Large 09/25/20 0928   Drainage Description Yellow 09/25/20 0928   Non-staged Wound Description Full thickness 09/25/20 0928   Dressing Status -- (TCC intact) 09/25/20 0928       /78   Pulse 84   Temp 98 °F (36 7 °C)   Resp 16     Physical Exam  Vitals signs and nursing note reviewed  Constitutional:       General: He is not in acute distress  Appearance: Normal appearance  He is normal weight  He is not ill-appearing, toxic-appearing or diaphoretic  Neurological:      Mental Status: He is alert  Wound Instructions:  Orders Placed This Encounter   Procedures    Wound cleansing and dressings     Right Foot wound  Wash your hands with soap and water  Remove old dressing, discard into plastic bag and place in trash  Cleanse the wound with NSS prior to applying a clean dressing  Do not use tissue or cotton balls  Do not scrub the wound  Pat dry using gauze  Shower no   Apply Skin prep to skin surrounding wound  Apply Opticell AG to the wound  Cover with Optilock and foam then ABD Pad  Secure with Kacy and tape then TCC   Change dressing Weekly at the [facility]     Standing Status:   Future     Standing Expiration Date:   9/25/2021    Wound off loading     Total Contact Cast Instructions: Do not get cast wet  Contact wound center if there is a foul odor or becomes uncomfortable due to feeling tight or swelling  Do not use objects down inside of cast to scratch   Do not walk on cast without walking boot      Treatments above completed today at the [facility]     Standing Status:   Future     Standing Expiration Date:   9/25/2021

## 2020-09-25 NOTE — PATIENT INSTRUCTIONS
Orders Placed This Encounter   Procedures    Wound cleansing and dressings     Right Foot wound  Wash your hands with soap and water  Remove old dressing, discard into plastic bag and place in trash  Cleanse the wound with NSS prior to applying a clean dressing  Do not use tissue or cotton balls  Do not scrub the wound  Pat dry using gauze  Shower no   Apply Skin prep to skin surrounding wound  Apply Opticell AG to the wound  Cover with Optilock and foam then ABD Pad  Secure with Kacy and tape then TCC   Change dressing Weekly at the [facility]     Standing Status:   Future     Standing Expiration Date:   9/25/2021    Wound off loading     Total Contact Cast Instructions: Do not get cast wet  Contact wound center if there is a foul odor or becomes uncomfortable due to feeling tight or swelling  Do not use objects down inside of cast to scratch   Do not walk on cast without walking boot      Treatments above completed today at the [facility]     Standing Status:   Future     Standing Expiration Date:   9/25/2021

## 2020-10-02 ENCOUNTER — OFFICE VISIT (OUTPATIENT)
Dept: WOUND CARE | Facility: HOSPITAL | Age: 70
End: 2020-10-02
Payer: MEDICARE

## 2020-10-02 VITALS
SYSTOLIC BLOOD PRESSURE: 147 MMHG | DIASTOLIC BLOOD PRESSURE: 81 MMHG | TEMPERATURE: 97.7 F | RESPIRATION RATE: 16 BRPM | HEART RATE: 91 BPM

## 2020-10-02 DIAGNOSIS — L97.516 DIABETIC ULCER OF TOE OF RIGHT FOOT ASSOCIATED WITH DIABETES MELLITUS DUE TO UNDERLYING CONDITION, WITH BONE INVOLVEMENT WITHOUT EVIDENCE OF NECROSIS (HCC): Primary | ICD-10-CM

## 2020-10-02 DIAGNOSIS — E08.621 DIABETIC ULCER OF TOE OF RIGHT FOOT ASSOCIATED WITH DIABETES MELLITUS DUE TO UNDERLYING CONDITION, WITH BONE INVOLVEMENT WITHOUT EVIDENCE OF NECROSIS (HCC): Primary | ICD-10-CM

## 2020-10-02 DIAGNOSIS — E11.40 TYPE 2 DIABETES MELLITUS WITH DIABETIC NEUROPATHY, UNSPECIFIED WHETHER LONG TERM INSULIN USE (HCC): ICD-10-CM

## 2020-10-02 PROCEDURE — 29445 APPL RIGID TOT CNTC LEG CAST: CPT | Performed by: PODIATRIST

## 2020-10-02 RX ORDER — LIDOCAINE HYDROCHLORIDE 40 MG/ML
1 SOLUTION TOPICAL ONCE
Status: COMPLETED | OUTPATIENT
Start: 2020-10-02 | End: 2020-10-02

## 2020-10-02 RX ADMIN — LIDOCAINE HYDROCHLORIDE 1 ML: 40 SOLUTION TOPICAL at 09:35

## 2020-10-09 ENCOUNTER — OFFICE VISIT (OUTPATIENT)
Dept: WOUND CARE | Facility: HOSPITAL | Age: 70
End: 2020-10-09
Payer: MEDICARE

## 2020-10-09 VITALS
SYSTOLIC BLOOD PRESSURE: 118 MMHG | RESPIRATION RATE: 20 BRPM | HEART RATE: 89 BPM | TEMPERATURE: 98 F | DIASTOLIC BLOOD PRESSURE: 72 MMHG

## 2020-10-09 DIAGNOSIS — L97.516 DIABETIC ULCER OF TOE OF RIGHT FOOT ASSOCIATED WITH DIABETES MELLITUS DUE TO UNDERLYING CONDITION, WITH BONE INVOLVEMENT WITHOUT EVIDENCE OF NECROSIS (HCC): Primary | ICD-10-CM

## 2020-10-09 DIAGNOSIS — E08.621 DIABETIC ULCER OF TOE OF RIGHT FOOT ASSOCIATED WITH DIABETES MELLITUS DUE TO UNDERLYING CONDITION, WITH BONE INVOLVEMENT WITHOUT EVIDENCE OF NECROSIS (HCC): Primary | ICD-10-CM

## 2020-10-09 PROCEDURE — 29445 APPL RIGID TOT CNTC LEG CAST: CPT | Performed by: PODIATRIST

## 2020-10-09 RX ORDER — LIDOCAINE HYDROCHLORIDE 40 MG/ML
5 SOLUTION TOPICAL ONCE
Status: COMPLETED | OUTPATIENT
Start: 2020-10-09 | End: 2020-10-09

## 2020-10-09 RX ADMIN — LIDOCAINE HYDROCHLORIDE 5 ML: 40 SOLUTION TOPICAL at 10:44

## 2020-10-16 ENCOUNTER — OFFICE VISIT (OUTPATIENT)
Dept: WOUND CARE | Facility: HOSPITAL | Age: 70
End: 2020-10-16
Payer: MEDICARE

## 2020-10-16 VITALS
TEMPERATURE: 97.7 F | HEART RATE: 62 BPM | RESPIRATION RATE: 16 BRPM | SYSTOLIC BLOOD PRESSURE: 151 MMHG | DIASTOLIC BLOOD PRESSURE: 91 MMHG

## 2020-10-16 DIAGNOSIS — E11.40 TYPE 2 DIABETES MELLITUS WITH DIABETIC NEUROPATHY, UNSPECIFIED WHETHER LONG TERM INSULIN USE (HCC): Chronic | ICD-10-CM

## 2020-10-16 DIAGNOSIS — L97.516 DIABETIC ULCER OF TOE OF RIGHT FOOT ASSOCIATED WITH DIABETES MELLITUS DUE TO UNDERLYING CONDITION, WITH BONE INVOLVEMENT WITHOUT EVIDENCE OF NECROSIS (HCC): Primary | ICD-10-CM

## 2020-10-16 DIAGNOSIS — E08.621 DIABETIC ULCER OF TOE OF RIGHT FOOT ASSOCIATED WITH DIABETES MELLITUS DUE TO UNDERLYING CONDITION, WITH BONE INVOLVEMENT WITHOUT EVIDENCE OF NECROSIS (HCC): Primary | ICD-10-CM

## 2020-10-16 PROCEDURE — 29445 APPL RIGID TOT CNTC LEG CAST: CPT | Performed by: PODIATRIST

## 2020-10-23 ENCOUNTER — OFFICE VISIT (OUTPATIENT)
Dept: WOUND CARE | Facility: HOSPITAL | Age: 70
End: 2020-10-23
Payer: MEDICARE

## 2020-10-23 VITALS
SYSTOLIC BLOOD PRESSURE: 137 MMHG | DIASTOLIC BLOOD PRESSURE: 80 MMHG | HEART RATE: 94 BPM | TEMPERATURE: 97.7 F | RESPIRATION RATE: 16 BRPM

## 2020-10-23 DIAGNOSIS — E11.621 DIABETIC ULCER OF TOE OF RIGHT FOOT ASSOCIATED WITH TYPE 2 DIABETES MELLITUS, WITH BONE INVOLVEMENT WITHOUT EVIDENCE OF NECROSIS (HCC): Primary | ICD-10-CM

## 2020-10-23 DIAGNOSIS — L97.516 DIABETIC ULCER OF TOE OF RIGHT FOOT ASSOCIATED WITH TYPE 2 DIABETES MELLITUS, WITH BONE INVOLVEMENT WITHOUT EVIDENCE OF NECROSIS (HCC): Primary | ICD-10-CM

## 2020-10-23 DIAGNOSIS — E11.40 TYPE 2 DIABETES MELLITUS WITH DIABETIC NEUROPATHY, UNSPECIFIED WHETHER LONG TERM INSULIN USE (HCC): Chronic | ICD-10-CM

## 2020-10-23 PROCEDURE — 29445 APPL RIGID TOT CNTC LEG CAST: CPT | Performed by: PODIATRIST

## 2020-10-23 PROCEDURE — NC001 PR NO CHARGE: Performed by: PODIATRIST

## 2020-10-23 RX ORDER — LIDOCAINE HYDROCHLORIDE 40 MG/ML
5 SOLUTION TOPICAL ONCE
Status: COMPLETED | OUTPATIENT
Start: 2020-10-23 | End: 2020-10-23

## 2020-10-23 RX ADMIN — LIDOCAINE HYDROCHLORIDE 5 ML: 40 SOLUTION TOPICAL at 09:27

## 2020-10-30 ENCOUNTER — OFFICE VISIT (OUTPATIENT)
Dept: WOUND CARE | Facility: HOSPITAL | Age: 70
End: 2020-10-30
Payer: MEDICARE

## 2020-10-30 VITALS
DIASTOLIC BLOOD PRESSURE: 77 MMHG | RESPIRATION RATE: 16 BRPM | SYSTOLIC BLOOD PRESSURE: 148 MMHG | HEART RATE: 91 BPM | TEMPERATURE: 97.5 F

## 2020-10-30 DIAGNOSIS — E11.40 TYPE 2 DIABETES MELLITUS WITH DIABETIC NEUROPATHY, UNSPECIFIED WHETHER LONG TERM INSULIN USE (HCC): Chronic | ICD-10-CM

## 2020-10-30 DIAGNOSIS — L97.512 DIABETIC ULCER OF TOE OF RIGHT FOOT ASSOCIATED WITH TYPE 2 DIABETES MELLITUS, WITH FAT LAYER EXPOSED (HCC): Primary | ICD-10-CM

## 2020-10-30 DIAGNOSIS — E11.621 DIABETIC ULCER OF TOE OF RIGHT FOOT ASSOCIATED WITH TYPE 2 DIABETES MELLITUS, WITH FAT LAYER EXPOSED (HCC): Primary | ICD-10-CM

## 2020-10-30 PROCEDURE — 29445 APPL RIGID TOT CNTC LEG CAST: CPT | Performed by: PODIATRIST

## 2020-10-30 RX ORDER — LIDOCAINE HYDROCHLORIDE 40 MG/ML
5 SOLUTION TOPICAL ONCE
Status: COMPLETED | OUTPATIENT
Start: 2020-10-30 | End: 2020-10-30

## 2020-10-30 RX ADMIN — LIDOCAINE HYDROCHLORIDE 5 ML: 40 SOLUTION TOPICAL at 09:26

## 2020-11-05 ENCOUNTER — OFFICE VISIT (OUTPATIENT)
Dept: WOUND CARE | Facility: HOSPITAL | Age: 70
End: 2020-11-05
Payer: MEDICARE

## 2020-11-05 VITALS
TEMPERATURE: 97 F | DIASTOLIC BLOOD PRESSURE: 79 MMHG | RESPIRATION RATE: 16 BRPM | SYSTOLIC BLOOD PRESSURE: 129 MMHG | HEART RATE: 67 BPM

## 2020-11-05 DIAGNOSIS — L97.512 RIGHT FOOT ULCER, WITH FAT LAYER EXPOSED (HCC): Primary | ICD-10-CM

## 2020-11-05 DIAGNOSIS — E11.40 TYPE 2 DIABETES MELLITUS WITH DIABETIC NEUROPATHY, UNSPECIFIED WHETHER LONG TERM INSULIN USE (HCC): ICD-10-CM

## 2020-11-05 PROCEDURE — 29445 APPL RIGID TOT CNTC LEG CAST: CPT | Performed by: PODIATRIST

## 2020-11-05 RX ORDER — LIDOCAINE HYDROCHLORIDE 40 MG/ML
5 SOLUTION TOPICAL ONCE
Status: COMPLETED | OUTPATIENT
Start: 2020-11-05 | End: 2020-11-05

## 2020-11-05 RX ADMIN — LIDOCAINE HYDROCHLORIDE 5 ML: 40 SOLUTION TOPICAL at 09:54

## 2020-11-13 ENCOUNTER — OFFICE VISIT (OUTPATIENT)
Dept: WOUND CARE | Facility: HOSPITAL | Age: 70
End: 2020-11-13
Payer: MEDICARE

## 2020-11-13 VITALS
DIASTOLIC BLOOD PRESSURE: 72 MMHG | HEART RATE: 101 BPM | TEMPERATURE: 97.7 F | RESPIRATION RATE: 16 BRPM | SYSTOLIC BLOOD PRESSURE: 148 MMHG

## 2020-11-13 DIAGNOSIS — L97.512 RIGHT FOOT ULCER, WITH FAT LAYER EXPOSED (HCC): Primary | ICD-10-CM

## 2020-11-13 PROCEDURE — 97597 DBRDMT OPN WND 1ST 20 CM/<: CPT | Performed by: PODIATRIST

## 2020-11-13 RX ORDER — LIDOCAINE HYDROCHLORIDE 40 MG/ML
5 SOLUTION TOPICAL ONCE
Status: COMPLETED | OUTPATIENT
Start: 2020-11-13 | End: 2020-11-13

## 2020-11-13 RX ADMIN — LIDOCAINE HYDROCHLORIDE 5 ML: 40 SOLUTION TOPICAL at 09:05

## 2020-11-20 ENCOUNTER — OFFICE VISIT (OUTPATIENT)
Dept: WOUND CARE | Facility: HOSPITAL | Age: 70
End: 2020-11-20
Payer: MEDICARE

## 2020-11-20 VITALS
HEART RATE: 76 BPM | TEMPERATURE: 97.4 F | DIASTOLIC BLOOD PRESSURE: 80 MMHG | RESPIRATION RATE: 16 BRPM | SYSTOLIC BLOOD PRESSURE: 134 MMHG

## 2020-11-20 DIAGNOSIS — L97.512 RIGHT FOOT ULCER, WITH FAT LAYER EXPOSED (HCC): Primary | ICD-10-CM

## 2020-11-20 DIAGNOSIS — E11.40 TYPE 2 DIABETES MELLITUS WITH DIABETIC NEUROPATHY, UNSPECIFIED WHETHER LONG TERM INSULIN USE (HCC): Chronic | ICD-10-CM

## 2020-11-20 PROCEDURE — 11042 DBRDMT SUBQ TIS 1ST 20SQCM/<: CPT | Performed by: PODIATRIST

## 2020-11-20 RX ORDER — LIDOCAINE HYDROCHLORIDE 40 MG/ML
5 SOLUTION TOPICAL ONCE
Status: COMPLETED | OUTPATIENT
Start: 2020-11-20 | End: 2020-11-20

## 2020-11-20 RX ADMIN — LIDOCAINE HYDROCHLORIDE 5 ML: 40 SOLUTION TOPICAL at 09:06

## 2020-11-27 ENCOUNTER — OFFICE VISIT (OUTPATIENT)
Dept: WOUND CARE | Facility: HOSPITAL | Age: 70
End: 2020-11-27
Payer: MEDICARE

## 2020-11-27 VITALS
RESPIRATION RATE: 18 BRPM | SYSTOLIC BLOOD PRESSURE: 143 MMHG | DIASTOLIC BLOOD PRESSURE: 92 MMHG | HEART RATE: 75 BPM | TEMPERATURE: 97.3 F

## 2020-11-27 DIAGNOSIS — L97.512 RIGHT FOOT ULCER, WITH FAT LAYER EXPOSED (HCC): Primary | ICD-10-CM

## 2020-11-27 DIAGNOSIS — E11.40 TYPE 2 DIABETES MELLITUS WITH DIABETIC NEUROPATHY, UNSPECIFIED WHETHER LONG TERM INSULIN USE (HCC): Chronic | ICD-10-CM

## 2020-11-27 PROCEDURE — 11042 DBRDMT SUBQ TIS 1ST 20SQCM/<: CPT | Performed by: PODIATRIST

## 2020-11-27 RX ORDER — LIDOCAINE HYDROCHLORIDE 40 MG/ML
5 SOLUTION TOPICAL ONCE
Status: COMPLETED | OUTPATIENT
Start: 2020-11-27 | End: 2020-11-27

## 2020-11-27 RX ADMIN — LIDOCAINE HYDROCHLORIDE 5 ML: 40 SOLUTION TOPICAL at 09:07

## 2020-12-04 ENCOUNTER — OFFICE VISIT (OUTPATIENT)
Dept: WOUND CARE | Facility: HOSPITAL | Age: 70
End: 2020-12-04
Payer: MEDICARE

## 2020-12-04 VITALS
DIASTOLIC BLOOD PRESSURE: 72 MMHG | RESPIRATION RATE: 18 BRPM | TEMPERATURE: 98.1 F | HEART RATE: 98 BPM | SYSTOLIC BLOOD PRESSURE: 118 MMHG

## 2020-12-04 DIAGNOSIS — L97.512 RIGHT FOOT ULCER, WITH FAT LAYER EXPOSED (HCC): ICD-10-CM

## 2020-12-04 DIAGNOSIS — Q66.01 CONGENITAL TALIPES EQUINOVARUS, RIGHT FOOT: Primary | ICD-10-CM

## 2020-12-04 PROCEDURE — 99213 OFFICE O/P EST LOW 20 MIN: CPT | Performed by: PODIATRIST

## 2020-12-04 PROCEDURE — 11042 DBRDMT SUBQ TIS 1ST 20SQCM/<: CPT | Performed by: PODIATRIST

## 2020-12-04 RX ORDER — LIDOCAINE HYDROCHLORIDE 40 MG/ML
5 SOLUTION TOPICAL ONCE
Status: COMPLETED | OUTPATIENT
Start: 2020-12-04 | End: 2020-12-04

## 2020-12-04 RX ADMIN — LIDOCAINE HYDROCHLORIDE 5 ML: 40 SOLUTION TOPICAL at 08:33

## 2020-12-11 ENCOUNTER — OFFICE VISIT (OUTPATIENT)
Dept: WOUND CARE | Facility: HOSPITAL | Age: 70
End: 2020-12-11
Payer: MEDICARE

## 2020-12-11 VITALS
SYSTOLIC BLOOD PRESSURE: 102 MMHG | RESPIRATION RATE: 18 BRPM | HEART RATE: 72 BPM | TEMPERATURE: 98.7 F | DIASTOLIC BLOOD PRESSURE: 64 MMHG

## 2020-12-11 DIAGNOSIS — Q66.01 CONGENITAL TALIPES EQUINOVARUS, RIGHT FOOT: ICD-10-CM

## 2020-12-11 DIAGNOSIS — E11.40 TYPE 2 DIABETES MELLITUS WITH DIABETIC NEUROPATHY, UNSPECIFIED WHETHER LONG TERM INSULIN USE (HCC): Chronic | ICD-10-CM

## 2020-12-11 DIAGNOSIS — L97.512 RIGHT FOOT ULCER, WITH FAT LAYER EXPOSED (HCC): Primary | ICD-10-CM

## 2020-12-11 PROCEDURE — 11042 DBRDMT SUBQ TIS 1ST 20SQCM/<: CPT | Performed by: PODIATRIST

## 2020-12-11 PROCEDURE — 99214 OFFICE O/P EST MOD 30 MIN: CPT | Performed by: PODIATRIST

## 2020-12-11 RX ORDER — LIDOCAINE HYDROCHLORIDE 40 MG/ML
5 SOLUTION TOPICAL ONCE
Status: COMPLETED | OUTPATIENT
Start: 2020-12-11 | End: 2020-12-11

## 2020-12-11 RX ADMIN — LIDOCAINE HYDROCHLORIDE 5 ML: 40 SOLUTION TOPICAL at 09:15

## 2020-12-14 ENCOUNTER — PREP FOR PROCEDURE (OUTPATIENT)
Dept: PODIATRY | Facility: CLINIC | Age: 70
End: 2020-12-14

## 2020-12-14 DIAGNOSIS — M89.8X9 EXOSTOSIS: ICD-10-CM

## 2020-12-14 DIAGNOSIS — E11.621 DIABETIC ULCER OF RIGHT MIDFOOT ASSOCIATED WITH TYPE 2 DIABETES MELLITUS, WITH FAT LAYER EXPOSED (HCC): ICD-10-CM

## 2020-12-14 DIAGNOSIS — Z01.818 PREOP TESTING: Primary | ICD-10-CM

## 2020-12-14 DIAGNOSIS — L97.412 DIABETIC ULCER OF RIGHT MIDFOOT ASSOCIATED WITH TYPE 2 DIABETES MELLITUS, WITH FAT LAYER EXPOSED (HCC): ICD-10-CM

## 2020-12-14 RX ORDER — CEFAZOLIN SODIUM 2 G/50ML
2000 SOLUTION INTRAVENOUS ONCE
Status: CANCELLED | OUTPATIENT
Start: 2020-12-28

## 2020-12-15 ENCOUNTER — TELEPHONE (OUTPATIENT)
Dept: PODIATRY | Facility: CLINIC | Age: 70
End: 2020-12-15

## 2020-12-16 ENCOUNTER — TREATMENT (OUTPATIENT)
Dept: FAMILY MEDICINE CLINIC | Facility: CLINIC | Age: 70
End: 2020-12-16

## 2020-12-16 DIAGNOSIS — E11.40 TYPE 2 DIABETES MELLITUS WITH DIABETIC NEUROPATHY, UNSPECIFIED WHETHER LONG TERM INSULIN USE (HCC): Chronic | ICD-10-CM

## 2020-12-16 DIAGNOSIS — I10 ESSENTIAL HYPERTENSION: ICD-10-CM

## 2020-12-16 DIAGNOSIS — E78.5 HYPERLIPIDEMIA, UNSPECIFIED HYPERLIPIDEMIA TYPE: Chronic | ICD-10-CM

## 2020-12-16 DIAGNOSIS — I48.91 ATRIAL FIBRILLATION WITH RVR (HCC): ICD-10-CM

## 2020-12-16 DIAGNOSIS — I48.91 ATRIAL FIBRILLATION, UNSPECIFIED TYPE (HCC): ICD-10-CM

## 2020-12-16 DIAGNOSIS — E11.9 TYPE 2 DIABETES MELLITUS WITHOUT COMPLICATION, UNSPECIFIED WHETHER LONG TERM INSULIN USE (HCC): ICD-10-CM

## 2020-12-16 RX ORDER — LISINOPRIL 40 MG/1
40 TABLET ORAL DAILY
Qty: 90 TABLET | Refills: 3 | Status: SHIPPED | OUTPATIENT
Start: 2020-12-16 | End: 2022-01-20 | Stop reason: SDUPTHER

## 2020-12-16 RX ORDER — ATORVASTATIN CALCIUM 40 MG/1
TABLET, FILM COATED ORAL
Qty: 90 TABLET | Refills: 3 | Status: SHIPPED | OUTPATIENT
Start: 2020-12-16 | End: 2020-12-16 | Stop reason: SDUPTHER

## 2020-12-16 RX ORDER — SITAGLIPTIN 100 MG/1
TABLET, FILM COATED ORAL
Qty: 132 TABLET | Refills: 3 | Status: SHIPPED | OUTPATIENT
Start: 2020-12-16 | End: 2020-12-16 | Stop reason: SDUPTHER

## 2020-12-16 RX ORDER — DILTIAZEM HYDROCHLORIDE 120 MG/1
120 TABLET, FILM COATED ORAL 2 TIMES DAILY
Qty: 180 TABLET | Refills: 6 | Status: SHIPPED | OUTPATIENT
Start: 2020-12-16 | End: 2021-04-30 | Stop reason: HOSPADM

## 2020-12-16 RX ORDER — APIXABAN 5 MG/1
TABLET, FILM COATED ORAL
Qty: 60 TABLET | Refills: 2 | Status: SHIPPED | OUTPATIENT
Start: 2020-12-16 | End: 2020-12-16 | Stop reason: SDUPTHER

## 2020-12-16 RX ORDER — ATORVASTATIN CALCIUM 40 MG/1
40 TABLET, FILM COATED ORAL DAILY
Qty: 90 TABLET | Refills: 3 | Status: SHIPPED | OUTPATIENT
Start: 2020-12-16 | End: 2022-01-03 | Stop reason: SDUPTHER

## 2020-12-16 RX ORDER — APIXABAN 5 MG/1
5 TABLET, FILM COATED ORAL 2 TIMES DAILY
Qty: 60 TABLET | Refills: 2 | Status: SHIPPED | OUTPATIENT
Start: 2020-12-16 | End: 2021-06-19

## 2020-12-16 RX ORDER — METFORMIN HYDROCHLORIDE 750 MG/1
750 TABLET, EXTENDED RELEASE ORAL 2 TIMES DAILY
Qty: 180 TABLET | Refills: 3 | Status: SHIPPED | OUTPATIENT
Start: 2020-12-16 | End: 2021-03-17 | Stop reason: DRUGHIGH

## 2020-12-16 RX ORDER — DILTIAZEM HYDROCHLORIDE 120 MG/1
120 TABLET, FILM COATED ORAL 2 TIMES DAILY
Qty: 180 TABLET | Refills: 6 | Status: SHIPPED | OUTPATIENT
Start: 2020-12-16 | End: 2020-12-16 | Stop reason: SDUPTHER

## 2020-12-16 RX ORDER — SITAGLIPTIN 100 MG/1
100 TABLET, FILM COATED ORAL DAILY
Qty: 132 TABLET | Refills: 3 | Status: SHIPPED | OUTPATIENT
Start: 2020-12-16 | End: 2021-12-12

## 2020-12-18 ENCOUNTER — OFFICE VISIT (OUTPATIENT)
Dept: WOUND CARE | Facility: HOSPITAL | Age: 70
End: 2020-12-18
Payer: MEDICARE

## 2020-12-18 VITALS
DIASTOLIC BLOOD PRESSURE: 68 MMHG | SYSTOLIC BLOOD PRESSURE: 126 MMHG | TEMPERATURE: 97.9 F | RESPIRATION RATE: 16 BRPM | HEART RATE: 80 BPM

## 2020-12-18 DIAGNOSIS — E11.40 TYPE 2 DIABETES MELLITUS WITH DIABETIC NEUROPATHY, UNSPECIFIED WHETHER LONG TERM INSULIN USE (HCC): Chronic | ICD-10-CM

## 2020-12-18 DIAGNOSIS — L97.512 RIGHT FOOT ULCER, WITH FAT LAYER EXPOSED (HCC): Primary | ICD-10-CM

## 2020-12-18 PROCEDURE — 11042 DBRDMT SUBQ TIS 1ST 20SQCM/<: CPT | Performed by: PODIATRIST

## 2020-12-18 RX ORDER — LIDOCAINE HYDROCHLORIDE 40 MG/ML
5 SOLUTION TOPICAL ONCE
Status: COMPLETED | OUTPATIENT
Start: 2020-12-18 | End: 2020-12-18

## 2020-12-18 RX ADMIN — LIDOCAINE HYDROCHLORIDE 5 ML: 40 SOLUTION TOPICAL at 09:05

## 2020-12-21 ENCOUNTER — TELEPHONE (OUTPATIENT)
Dept: PODIATRY | Facility: CLINIC | Age: 70
End: 2020-12-21

## 2020-12-23 ENCOUNTER — CONSULT (OUTPATIENT)
Dept: FAMILY MEDICINE CLINIC | Facility: CLINIC | Age: 70
End: 2020-12-23
Payer: MEDICARE

## 2020-12-23 VITALS
OXYGEN SATURATION: 97 % | TEMPERATURE: 98.1 F | RESPIRATION RATE: 18 BRPM | WEIGHT: 234 LBS | BODY MASS INDEX: 28.49 KG/M2 | HEIGHT: 76 IN | HEART RATE: 103 BPM | DIASTOLIC BLOOD PRESSURE: 88 MMHG | SYSTOLIC BLOOD PRESSURE: 142 MMHG

## 2020-12-23 DIAGNOSIS — Z23 ENCOUNTER FOR IMMUNIZATION: Primary | ICD-10-CM

## 2020-12-23 DIAGNOSIS — Z86.73 HISTORY OF TIA (TRANSIENT ISCHEMIC ATTACK): ICD-10-CM

## 2020-12-23 DIAGNOSIS — E78.5 HYPERLIPIDEMIA, UNSPECIFIED HYPERLIPIDEMIA TYPE: Chronic | ICD-10-CM

## 2020-12-23 DIAGNOSIS — E11.40 TYPE 2 DIABETES MELLITUS WITH DIABETIC NEUROPATHY, UNSPECIFIED WHETHER LONG TERM INSULIN USE (HCC): Chronic | ICD-10-CM

## 2020-12-23 DIAGNOSIS — N40.1 BENIGN PROSTATIC HYPERPLASIA WITH WEAK URINARY STREAM: Chronic | ICD-10-CM

## 2020-12-23 DIAGNOSIS — R91.1 RIGHT MIDDLE LOBE PULMONARY NODULE: ICD-10-CM

## 2020-12-23 DIAGNOSIS — I48.91 ATRIAL FIBRILLATION WITH RVR (HCC): ICD-10-CM

## 2020-12-23 DIAGNOSIS — E11.621 DIABETIC ULCER OF RIGHT MIDFOOT ASSOCIATED WITH TYPE 2 DIABETES MELLITUS, WITH FAT LAYER EXPOSED (HCC): ICD-10-CM

## 2020-12-23 DIAGNOSIS — L97.412 DIABETIC ULCER OF RIGHT MIDFOOT ASSOCIATED WITH TYPE 2 DIABETES MELLITUS, WITH FAT LAYER EXPOSED (HCC): ICD-10-CM

## 2020-12-23 DIAGNOSIS — R80.9 POSITIVE FOR MICROALBUMINURIA: Chronic | ICD-10-CM

## 2020-12-23 DIAGNOSIS — M89.8X9 EXOSTOSIS: ICD-10-CM

## 2020-12-23 DIAGNOSIS — Z01.818 PREOP TESTING: ICD-10-CM

## 2020-12-23 DIAGNOSIS — R39.12 BENIGN PROSTATIC HYPERPLASIA WITH WEAK URINARY STREAM: Chronic | ICD-10-CM

## 2020-12-23 DIAGNOSIS — Q66.01 CONGENITAL TALIPES EQUINOVARUS, RIGHT FOOT: ICD-10-CM

## 2020-12-23 DIAGNOSIS — I10 ESSENTIAL HYPERTENSION: Chronic | ICD-10-CM

## 2020-12-23 LAB
ANION GAP SERPL CALCULATED.3IONS-SCNC: 1 MMOL/L (ref 4–13)
BASOPHILS # BLD AUTO: 0.04 THOUSANDS/ΜL (ref 0–0.1)
BASOPHILS NFR BLD AUTO: 1 % (ref 0–1)
BUN SERPL-MCNC: 20 MG/DL (ref 5–25)
CALCIUM SERPL-MCNC: 10 MG/DL (ref 8.3–10.1)
CHLORIDE SERPL-SCNC: 107 MMOL/L (ref 100–108)
CO2 SERPL-SCNC: 31 MMOL/L (ref 21–32)
CREAT SERPL-MCNC: 1.14 MG/DL (ref 0.6–1.3)
EOSINOPHIL # BLD AUTO: 0.19 THOUSAND/ΜL (ref 0–0.61)
EOSINOPHIL NFR BLD AUTO: 3 % (ref 0–6)
ERYTHROCYTE [DISTWIDTH] IN BLOOD BY AUTOMATED COUNT: 13.7 % (ref 11.6–15.1)
EST. AVERAGE GLUCOSE BLD GHB EST-MCNC: 177 MG/DL
GFR SERPL CREATININE-BSD FRML MDRD: 66 ML/MIN/1.73SQ M
GLUCOSE SERPL-MCNC: 209 MG/DL (ref 65–140)
HBA1C MFR BLD: 7.8 %
HCT VFR BLD AUTO: 44.4 % (ref 36.5–49.3)
HGB BLD-MCNC: 14.2 G/DL (ref 12–17)
IMM GRANULOCYTES # BLD AUTO: 0.02 THOUSAND/UL (ref 0–0.2)
IMM GRANULOCYTES NFR BLD AUTO: 0 % (ref 0–2)
LYMPHOCYTES # BLD AUTO: 1.86 THOUSANDS/ΜL (ref 0.6–4.47)
LYMPHOCYTES NFR BLD AUTO: 25 % (ref 14–44)
MCH RBC QN AUTO: 30.6 PG (ref 26.8–34.3)
MCHC RBC AUTO-ENTMCNC: 32 G/DL (ref 31.4–37.4)
MCV RBC AUTO: 96 FL (ref 82–98)
MONOCYTES # BLD AUTO: 0.65 THOUSAND/ΜL (ref 0.17–1.22)
MONOCYTES NFR BLD AUTO: 9 % (ref 4–12)
NEUTROPHILS # BLD AUTO: 4.61 THOUSANDS/ΜL (ref 1.85–7.62)
NEUTS SEG NFR BLD AUTO: 62 % (ref 43–75)
NRBC BLD AUTO-RTO: 0 /100 WBCS
PLATELET # BLD AUTO: 226 THOUSANDS/UL (ref 149–390)
PMV BLD AUTO: 11 FL (ref 8.9–12.7)
POTASSIUM SERPL-SCNC: 4.9 MMOL/L (ref 3.5–5.3)
RBC # BLD AUTO: 4.64 MILLION/UL (ref 3.88–5.62)
SODIUM SERPL-SCNC: 139 MMOL/L (ref 136–145)
WBC # BLD AUTO: 7.37 THOUSAND/UL (ref 4.31–10.16)

## 2020-12-23 PROCEDURE — 80048 BASIC METABOLIC PNL TOTAL CA: CPT | Performed by: FAMILY MEDICINE

## 2020-12-23 PROCEDURE — 36415 COLL VENOUS BLD VENIPUNCTURE: CPT | Performed by: FAMILY MEDICINE

## 2020-12-23 PROCEDURE — 90662 IIV NO PRSV INCREASED AG IM: CPT

## 2020-12-23 PROCEDURE — 83036 HEMOGLOBIN GLYCOSYLATED A1C: CPT | Performed by: FAMILY MEDICINE

## 2020-12-23 PROCEDURE — G0008 ADMIN INFLUENZA VIRUS VAC: HCPCS

## 2020-12-23 PROCEDURE — 99214 OFFICE O/P EST MOD 30 MIN: CPT | Performed by: FAMILY MEDICINE

## 2020-12-23 PROCEDURE — 85025 COMPLETE CBC W/AUTO DIFF WBC: CPT | Performed by: FAMILY MEDICINE

## 2020-12-23 PROCEDURE — 93000 ELECTROCARDIOGRAM COMPLETE: CPT | Performed by: FAMILY MEDICINE

## 2020-12-24 RX ORDER — FENTANYL CITRATE 50 UG/ML
50 INJECTION, SOLUTION INTRAMUSCULAR; INTRAVENOUS
Status: CANCELLED | OUTPATIENT
Start: 2020-12-24

## 2020-12-24 RX ORDER — ONDANSETRON 2 MG/ML
4 INJECTION INTRAMUSCULAR; INTRAVENOUS EVERY 6 HOURS PRN
Status: CANCELLED | OUTPATIENT
Start: 2020-12-24

## 2020-12-28 ENCOUNTER — HOSPITAL ENCOUNTER (OUTPATIENT)
Facility: HOSPITAL | Age: 70
Setting detail: OUTPATIENT SURGERY
Discharge: HOME | End: 2020-12-28
Attending: PODIATRIST | Admitting: PODIATRIST
Payer: MEDICARE

## 2020-12-28 VITALS
BODY MASS INDEX: 28.01 KG/M2 | SYSTOLIC BLOOD PRESSURE: 105 MMHG | OXYGEN SATURATION: 97 % | RESPIRATION RATE: 18 BRPM | TEMPERATURE: 98.4 F | HEIGHT: 76 IN | WEIGHT: 230 LBS | DIASTOLIC BLOOD PRESSURE: 68 MMHG | HEART RATE: 91 BPM

## 2020-12-28 LAB
FLUAV RNA RESP QL NAA+PROBE: NEGATIVE
FLUBV RNA RESP QL NAA+PROBE: NEGATIVE
RSV RNA RESP QL NAA+PROBE: NEGATIVE
SARS-COV-2 RNA RESP QL NAA+PROBE: NEGATIVE

## 2020-12-28 PROCEDURE — 0241U HB NFCT DS VIR RESP RNA 4 TRGT: CPT | Performed by: PODIATRIST

## 2020-12-28 RX ORDER — CEFAZOLIN SODIUM 2 G/50ML
2000 SOLUTION INTRAVENOUS ONCE
Status: DISCONTINUED | OUTPATIENT
Start: 2020-12-28 | End: 2020-12-28 | Stop reason: HOSPADM

## 2020-12-28 RX ORDER — SODIUM CHLORIDE 9 MG/ML
125 INJECTION, SOLUTION INTRAVENOUS CONTINUOUS
Status: DISCONTINUED | OUTPATIENT
Start: 2020-12-28 | End: 2020-12-28 | Stop reason: HOSPADM

## 2020-12-29 ENCOUNTER — HOSPITAL ENCOUNTER (OUTPATIENT)
Facility: HOSPITAL | Age: 70
Setting detail: OUTPATIENT SURGERY
Discharge: HOME | End: 2020-12-29
Attending: PODIATRIST | Admitting: PODIATRIST
Payer: MEDICARE

## 2020-12-29 ENCOUNTER — APPOINTMENT (OUTPATIENT)
Dept: RADIOLOGY | Facility: HOSPITAL | Age: 70
End: 2020-12-29
Payer: MEDICARE

## 2020-12-29 ENCOUNTER — ANESTHESIA EVENT (OUTPATIENT)
Dept: PERIOP | Facility: HOSPITAL | Age: 70
End: 2020-12-29
Payer: MEDICARE

## 2020-12-29 ENCOUNTER — ANESTHESIA (OUTPATIENT)
Dept: PERIOP | Facility: HOSPITAL | Age: 70
End: 2020-12-29
Payer: MEDICARE

## 2020-12-29 VITALS
SYSTOLIC BLOOD PRESSURE: 113 MMHG | HEART RATE: 69 BPM | DIASTOLIC BLOOD PRESSURE: 78 MMHG | OXYGEN SATURATION: 97 % | TEMPERATURE: 98.4 F | RESPIRATION RATE: 16 BRPM

## 2020-12-29 VITALS — HEART RATE: 71 BPM

## 2020-12-29 DIAGNOSIS — Z98.890 POST-OPERATIVE STATE: Primary | ICD-10-CM

## 2020-12-29 LAB
GLUCOSE SERPL-MCNC: 173 MG/DL (ref 65–140)
GLUCOSE SERPL-MCNC: 228 MG/DL (ref 65–140)

## 2020-12-29 PROCEDURE — 82948 REAGENT STRIP/BLOOD GLUCOSE: CPT

## 2020-12-29 PROCEDURE — 73630 X-RAY EXAM OF FOOT: CPT

## 2020-12-29 PROCEDURE — NC001 PR NO CHARGE: Performed by: PODIATRIST

## 2020-12-29 PROCEDURE — 28122 PARTIAL REMOVAL OF FOOT BONE: CPT | Performed by: PODIATRIST

## 2020-12-29 PROCEDURE — 99024 POSTOP FOLLOW-UP VISIT: CPT | Performed by: PODIATRIST

## 2020-12-29 RX ORDER — OXYCODONE HYDROCHLORIDE AND ACETAMINOPHEN 5; 325 MG/1; MG/1
1 TABLET ORAL EVERY 4 HOURS PRN
Qty: 10 TABLET | Refills: 0 | Status: SHIPPED | OUTPATIENT
Start: 2020-12-29 | End: 2021-02-13 | Stop reason: SDUPTHER

## 2020-12-29 RX ORDER — ACETAMINOPHEN 325 MG/1
650 TABLET ORAL EVERY 6 HOURS PRN
Status: DISCONTINUED | OUTPATIENT
Start: 2020-12-29 | End: 2020-12-29 | Stop reason: HOSPADM

## 2020-12-29 RX ORDER — ONDANSETRON 2 MG/ML
4 INJECTION INTRAMUSCULAR; INTRAVENOUS EVERY 6 HOURS PRN
Status: DISCONTINUED | OUTPATIENT
Start: 2020-12-29 | End: 2020-12-29 | Stop reason: HOSPADM

## 2020-12-29 RX ORDER — PROPOFOL 10 MG/ML
INJECTION, EMULSION INTRAVENOUS AS NEEDED
Status: DISCONTINUED | OUTPATIENT
Start: 2020-12-29 | End: 2020-12-29

## 2020-12-29 RX ORDER — ONDANSETRON 2 MG/ML
4 INJECTION INTRAMUSCULAR; INTRAVENOUS ONCE AS NEEDED
Status: DISCONTINUED | OUTPATIENT
Start: 2020-12-29 | End: 2020-12-29 | Stop reason: HOSPADM

## 2020-12-29 RX ORDER — OXYCODONE HYDROCHLORIDE 5 MG/1
5 TABLET ORAL EVERY 4 HOURS PRN
Status: DISCONTINUED | OUTPATIENT
Start: 2020-12-29 | End: 2020-12-29 | Stop reason: HOSPADM

## 2020-12-29 RX ORDER — SODIUM CHLORIDE 9 MG/ML
125 INJECTION, SOLUTION INTRAVENOUS CONTINUOUS
Status: DISCONTINUED | OUTPATIENT
Start: 2020-12-29 | End: 2020-12-29 | Stop reason: HOSPADM

## 2020-12-29 RX ORDER — FENTANYL CITRATE/PF 50 MCG/ML
25 SYRINGE (ML) INJECTION
Status: DISCONTINUED | OUTPATIENT
Start: 2020-12-29 | End: 2020-12-29 | Stop reason: HOSPADM

## 2020-12-29 RX ORDER — FENTANYL CITRATE 50 UG/ML
50 INJECTION, SOLUTION INTRAMUSCULAR; INTRAVENOUS
Status: DISCONTINUED | OUTPATIENT
Start: 2020-12-29 | End: 2020-12-29 | Stop reason: HOSPADM

## 2020-12-29 RX ORDER — MIDAZOLAM HYDROCHLORIDE 2 MG/2ML
INJECTION, SOLUTION INTRAMUSCULAR; INTRAVENOUS AS NEEDED
Status: DISCONTINUED | OUTPATIENT
Start: 2020-12-29 | End: 2020-12-29

## 2020-12-29 RX ORDER — PROPOFOL 10 MG/ML
INJECTION, EMULSION INTRAVENOUS CONTINUOUS PRN
Status: DISCONTINUED | OUTPATIENT
Start: 2020-12-29 | End: 2020-12-29

## 2020-12-29 RX ORDER — ONDANSETRON 2 MG/ML
INJECTION INTRAMUSCULAR; INTRAVENOUS AS NEEDED
Status: DISCONTINUED | OUTPATIENT
Start: 2020-12-29 | End: 2020-12-29

## 2020-12-29 RX ORDER — CEFAZOLIN SODIUM 2 G/50ML
SOLUTION INTRAVENOUS AS NEEDED
Status: DISCONTINUED | OUTPATIENT
Start: 2020-12-29 | End: 2020-12-29

## 2020-12-29 RX ORDER — FENTANYL CITRATE 50 UG/ML
INJECTION, SOLUTION INTRAMUSCULAR; INTRAVENOUS AS NEEDED
Status: DISCONTINUED | OUTPATIENT
Start: 2020-12-29 | End: 2020-12-29

## 2020-12-29 RX ADMIN — PROPOFOL 100 MG: 10 INJECTION, EMULSION INTRAVENOUS at 16:19

## 2020-12-29 RX ADMIN — LIDOCAINE HYDROCHLORIDE 100 MG: 20 INJECTION, SOLUTION INTRAVENOUS at 16:19

## 2020-12-29 RX ADMIN — PHENYLEPHRINE HYDROCHLORIDE 100 MCG: 10 INJECTION INTRAVENOUS at 16:28

## 2020-12-29 RX ADMIN — PHENYLEPHRINE HYDROCHLORIDE 50 MCG: 10 INJECTION INTRAVENOUS at 16:35

## 2020-12-29 RX ADMIN — MIDAZOLAM 2 MG: 1 INJECTION INTRAMUSCULAR; INTRAVENOUS at 16:03

## 2020-12-29 RX ADMIN — SODIUM CHLORIDE: 0.9 INJECTION, SOLUTION INTRAVENOUS at 16:43

## 2020-12-29 RX ADMIN — PHENYLEPHRINE HYDROCHLORIDE 50 MCG: 10 INJECTION INTRAVENOUS at 16:24

## 2020-12-29 RX ADMIN — ONDANSETRON 4 MG: 2 INJECTION INTRAMUSCULAR; INTRAVENOUS at 16:24

## 2020-12-29 RX ADMIN — CEFAZOLIN SODIUM 2000 MG: 2 SOLUTION INTRAVENOUS at 16:08

## 2020-12-29 RX ADMIN — FENTANYL CITRATE 50 MCG: 50 INJECTION, SOLUTION INTRAMUSCULAR; INTRAVENOUS at 16:16

## 2020-12-29 RX ADMIN — PROPOFOL 100 MCG/KG/MIN: 10 INJECTION, EMULSION INTRAVENOUS at 16:19

## 2020-12-29 RX ADMIN — FENTANYL CITRATE 50 MCG: 50 INJECTION, SOLUTION INTRAMUSCULAR; INTRAVENOUS at 16:19

## 2020-12-29 RX ADMIN — PHENYLEPHRINE HYDROCHLORIDE 50 MCG: 10 INJECTION INTRAVENOUS at 16:26

## 2021-01-07 ENCOUNTER — OFFICE VISIT (OUTPATIENT)
Dept: WOUND CARE | Facility: HOSPITAL | Age: 71
End: 2021-01-07
Payer: COMMERCIAL

## 2021-01-07 VITALS
HEART RATE: 64 BPM | TEMPERATURE: 97.7 F | RESPIRATION RATE: 18 BRPM | SYSTOLIC BLOOD PRESSURE: 138 MMHG | DIASTOLIC BLOOD PRESSURE: 86 MMHG

## 2021-01-07 DIAGNOSIS — L97.512 RIGHT FOOT ULCER, WITH FAT LAYER EXPOSED (HCC): Primary | ICD-10-CM

## 2021-01-07 DIAGNOSIS — E11.40 TYPE 2 DIABETES MELLITUS WITH DIABETIC NEUROPATHY, UNSPECIFIED WHETHER LONG TERM INSULIN USE (HCC): ICD-10-CM

## 2021-01-07 PROCEDURE — 99213 OFFICE O/P EST LOW 20 MIN: CPT | Performed by: PODIATRIST

## 2021-01-07 PROCEDURE — 99024 POSTOP FOLLOW-UP VISIT: CPT | Performed by: PODIATRIST

## 2021-01-07 NOTE — PROGRESS NOTES
Patient ID: Ashley Rodriguez  is a 78 y o  male Date of Birth 1950     Chief Complaint  Chief Complaint   Patient presents with    Follow Up Wound Care Visit     Right foot wound       Allergies  Simvastatin and Itraconazole    Assessment:    No problem-specific Assessment & Plan notes found for this encounter  Diagnoses and all orders for this visit:    Right foot ulcer, with fat layer exposed ([facility] )  -     Wound cleansing and dressings; Future    Type 2 diabetes mellitus with diabetic neuropathy, unspecified whether long term insulin use ([facility] )              Procedures    Plan:  Incision is stable without signs of infection  There is mild wound dehiscence without deep probing  Continue NWB right foot  Acticoat / [place] / bulky dressing was applied  Instructed resting and elevation  Patient to see Dr Warren Santos in 1 week  Wound 06/04/20 Foot Right;Lateral (Active)   Wound Image Images linked 01/07/21 0906   Wound Description Epithelialization;Eschar;Pink;Slough; Yellow; Other (Comment) (Sutures) 01/07/21 0906   Lavern-wound Assessment Dry;Callus 01/07/21 0906   Wound Length (cm) 7 cm 01/07/21 0906   Wound Width (cm) 2 cm 01/07/21 0906   Wound Depth (cm) 0 3 cm 01/07/21 0906   Wound Surface Area (cm^2) 14 cm^2 01/07/21 0906   Wound Volume (cm^3) 4 2 cm^3 01/07/21 0906   Calculated Wound Volume (cm^3) 4 2 cm^3 01/07/21 0906   Change in Wound Size % -1066 67 01/07/21 0906   Wound Site Closure Sutures 01/07/21 0906   Drainage Amount Moderate 01/07/21 0906   Drainage Description Serosanguineous 01/07/21 0906   Non-staged Wound Description Full thickness 01/07/21 0906       Wound 06/04/20 Foot Right;Lateral (Active)   Date First Assessed/Time First Assessed: 06/04/20 0857   Pre-Existing Wound: Yes  Primary Wound Type: Diabetic Ulcer  Location: Foot  Wound Location Orientation: Right;Lateral       [REMOVED] Incision 12/04/18 Foot Right (Removed)   Resolved Date/Resolved Time: 08/14/20 9440  Date First Assessed/Time First Assessed: 12/04/18 4836   Location: Foot  Wound Location Orientation: Right  Wound Outcome: Healed       [REMOVED] Other Ulcers 11/07/18 Foot Right; Outer R foot diabetic ulcer, lateral side (Removed)   Resolved Date/Resolved Time: 08/14/20 0856  Date First Assessed/Time First Assessed: 11/07/18 1417   Location: Foot  Orientation: Right; Outer  Wound Description (Comments): R foot diabetic ulcer, lateral side  Dressing Status: Clean;Dry; Intact  Wound     [REMOVED] Wound 12/29/20 Foot Right (Removed)   Resolved Date: 01/07/21  Date First Assessed/Time First Assessed: 12/29/20 1639   Location: Foot  Wound Location Orientation: Right  Wound Description (Comments): kerlix, 4x4,xeroform  Incision's 1st Dressing: KERLIX (x1), DRESSING SPONGE 4 x 4 SINGLE    Subjective:        HPI  Con Drape presents for right foot wound  S/P exostectomy with wound closure last week per Dr Jayro Wallace  No constitutional signs of infection  Decreased post-op pain  No redness or edema        The following portions of the patient's history were reviewed and updated as appropriate: allergies, current medications, past family history, past medical history, past social history, past surgical history and problem list       PAST MEDICAL HISTORY:  Past Medical History:   Diagnosis Date    Arthritis     Atrial fibrillation ([address] )     Diagnosed 11/2018    Benign prostate hyperplasia 04/2002    Cellulitis     right lower leg     Colon polyp 2006    Diabetes mellitus ([address] )     Hearing aid worn     bilat    Hearing loss     90% loss left ear and 40% right ear    History of clubfoot     "since birth"    History of pneumonia     History of TIA (transient ischemic attack) 04/25/2017 11/30/18 pt denies    Hyperlipidemia 5/15/2014    Hypertension     Infectious viral hepatitis     "cant remember type"    Irregular heart beat     Neuropathy     both feet    Osteomyelitis ([address] )     right great toe    Right middle lobe pulmonary nodule 11/6/2018    Seasickness     Teeth missing     Type 2 diabetes mellitus with diabetic neuropathy ([facility] ) 11/6/2018    Wears glasses     reading    Wound, open     bottom of right foot       PAST SURGICAL HISTORY:  Past Surgical History:   Procedure Laterality Date    BUNIONECTOMY Right 12/4/2018    Procedure: 5TH METATARSAL BONE PARTIAL RESECTION, FULL THICKNESS DEBRIDEMENT OF DIABETIC ULCER;  Surgeon: Heather Melgoza DPM;  [facility]: [facility];  Service: Podiatry    CLUB FOOT RELEASE Bilateral     COLONOSCOPY      MN PART [address] [facility] BONE METATARSAL HEAD,EA Right 12/29/2020    Procedure: EXCISION EXOSTOSIS;  Surgeon: Ponce Peraza DPM;  [facility];  Service: Podiatry    MN SECD CLOS SURG WND EXTEN/COMPLIC Right 90/28/7421    Procedure: PRIMARY DELAYED CLOSURE;  Surgeon: Ponce Peraza DPM;  [facility];  Service: Podiatry    TOE AMPUTATION Right     partial great toe    VASECTOMY  1992        ALLERGIES:  Simvastatin and Itraconazole    MEDICATIONS:  Current Outpatient Medications   Medication Sig Dispense Refill    Acetaminophen (TYLENOL ARTHRITIS PAIN PO) Take 650 mg by mouth 2 (two) times a day      atorvastatin (LIPITOR) 40 mg tablet Take 1 tablet (40 mg total) by mouth daily 90 tablet 3    Blood Glucose Monitoring Suppl (ACCU-CHEK JYOTI SMARTVIEW) w/Device KIT use to check Blood Sugar as needed      diltiazem (CARDIZEM) 120 MG tablet Take 1 tablet (120 mg total) by mouth 2 (two) times a day 180 tablet 6    Eliquis 5 MG Take 1 tablet (5 mg total) by mouth 2 (two) times a day 60 tablet 2    glimepiride (AMARYL) 4 mg tablet Take one tablet by mouth twice daily 180 tablet 3    glucose blood (ACCU-CHEK SMARTVIEW) test strip Use as instructed  each 12    Januvia 100 MG tablet Take 1 tablet (100 mg total) by mouth daily 132 tablet 3    lisinopril (ZESTRIL) 40 mg tablet Take 1 tablet (40 mg total) by mouth daily 90 tablet 3    metFORMIN (GLUCOPHAGE-XR) 750 mg 24 hr tablet Take 1 tablet (750 mg total) by mouth 2 (two) times a day ONE  tablet 3    oxyCODONE-acetaminophen (PERCOCET) 5-325 mg per tablet Take 1 tablet by mouth every 4 (four) hours as needed for moderate pain for up to 10 dosesMax Daily Amount: 6 tablets 10 tablet 0     No current facility-administered medications for this visit  SOCIAL HISTORY:  Social History     Socioeconomic History    Marital status: /Civil Union     Spouse name: None    Number of children: None    Years of education: None    Highest education level: None   Occupational History    None   Social Needs    Financial resource strain: None    Food insecurity     Worry: None     Inability: None    Transportation needs     Medical: None     Non-medical: None   Tobacco Use    Smoking status: Former Smoker     Types: Cigarettes     Quit date: 1988     Years since quittin 1    Smokeless tobacco: Former User   Substance and Sexual Activity    Alcohol use: Yes     Frequency: 2-4 times a month     Drinks per session: 1 or 2     Comment: socially    Drug use: No    Sexual activity: None   Lifestyle    Physical activity     Days per week: None     Minutes per session: None    Stress: None   Relationships    Social connections     Talks on phone: None     Gets together: None     Attends Protestant service: None     Active member of club or organization: None     Attends meetings of clubs or organizations: None     Relationship status: None    Intimate partner violence     Fear of current or ex partner: None     Emotionally abused: None     Physically abused: None     Forced sexual activity: None   Other Topics Concern    None   Social History Narrative    None      Review of Systems   Constitutional: Negative for chills and fever  Respiratory: Negative for cough and shortness of breath  Cardiovascular: Negative for chest pain  Gastrointestinal: Negative for diarrhea, nausea and vomiting  Objective:       Wound 06/04/20 Foot Right;Lateral (Active)   Wound Image Images linked 01/07/21 0906   Wound Description Epithelialization;Eschar;Pink;Slough; Yellow; Other (Comment) (Sutures) 01/07/21 0906   Lavern-wound Assessment Dry;Callus 01/07/21 0906   Wound Length (cm) 7 cm 01/07/21 0906   Wound Width (cm) 2 cm 01/07/21 0906   Wound Depth (cm) 0 3 cm 01/07/21 0906   Wound Surface Area (cm^2) 14 cm^2 01/07/21 0906   Wound Volume (cm^3) 4 2 cm^3 01/07/21 0906   Calculated Wound Volume (cm^3) 4 2 cm^3 01/07/21 0906   Change in Wound Size % -1066 67 01/07/21 0906   Wound Site Closure Sutures 01/07/21 0906   Drainage Amount Moderate 01/07/21 0906   Drainage Description Serosanguineous 01/07/21 0906   Non-staged Wound Description Full thickness 01/07/21 0906       /86   Pulse 64   Temp 97 7 °F (36 5 °C)   Resp 18     Physical Exam  Vitals signs and nursing note reviewed  Constitutional:       General: He is not in acute distress  Appearance: Normal appearance  He is not ill-appearing or toxic-appearing  Cardiovascular:      Rate and Rhythm: Normal rate and regular rhythm  Pulses: Normal pulses  Pulmonary:      Effort: Pulmonary effort is normal  No respiratory distress  Musculoskeletal:         General: No swelling, tenderness or signs of injury  Comments: Right 5th ray amputation  Skin:     Coloration: Skin is not cyanotic or mottled  Findings: No erythema  Rash is not purpuric  Nails: There is no clubbing  Comments: Incision is coapted  There is mild dehiscence proximally without deep probing  Skin slough noted around the incision  No redness or cellulitis  No purulence  See wound assessment  Neurological:      General: No focal deficit present  Mental Status: He is alert and oriented to person, place, and time  Cranial Nerves: No cranial nerve deficit  Motor: No weakness     Psychiatric:         Mood and Affect: Mood normal  Behavior: Behavior normal          Thought Content: Thought content normal          Judgment: Judgment normal            Wound Instructions:  Orders Placed This Encounter   Procedures    Wound cleansing and dressings     Right Foot wound:  Keep dressing dry, Keep dressing in place until seen next week at the [address] [place] [address] 7, then cover with Alginate, gauze/Abd Pad, secured with cast padding, kerlix secured with tape, followed with Ace wrap  Dressing to be changed next week at the [facility]    Off-loading Instructions:    Keep weight and pressure off wound at all times  Treatments above were completed today at the [facility]     Standing Status:   Future     Standing Expiration Date:   1/7/2022        Diagnosis ICD-10-CM Associated Orders   1  Right foot ulcer, with fat layer exposed ([facility] [address] )  L97 [id number] Wound cleansing and dressings   2   Type 2 diabetes mellitus with diabetic neuropathy, unspecified whether long term insulin use ([facility])  E11 40

## 2021-01-07 NOTE — PATIENT INSTRUCTIONS
Orders Placed This Encounter   Procedures    Wound cleansing and dressings     Right Foot wound:  Keep dressing dry, Keep dressing in place until seen next week at the [address] [place] [address] 7, then cover with Alginate, gauze/Abd Pad, secured with cast padding, kerlix secured with tape, followed with Ace wrap  Dressing to be changed next week at the [facility]    Off-loading Instructions:    Keep weight and pressure off wound at all times      Treatments above were completed today at the [facility]     Standing Status:   Future     Standing Expiration Date:   1/7/2022

## 2021-01-15 ENCOUNTER — OFFICE VISIT (OUTPATIENT)
Dept: WOUND CARE | Facility: HOSPITAL | Age: 71
End: 2021-01-15
Payer: COMMERCIAL

## 2021-01-15 VITALS
RESPIRATION RATE: 20 BRPM | DIASTOLIC BLOOD PRESSURE: 82 MMHG | SYSTOLIC BLOOD PRESSURE: 154 MMHG | HEART RATE: 88 BPM | TEMPERATURE: 97.9 F

## 2021-01-15 DIAGNOSIS — E11.40 TYPE 2 DIABETES MELLITUS WITH DIABETIC NEUROPATHY, UNSPECIFIED WHETHER LONG TERM INSULIN USE (HCC): Chronic | ICD-10-CM

## 2021-01-15 DIAGNOSIS — T81.31XA DEHISCENCE OF CLOSURE OF SKIN, INITIAL ENCOUNTER: ICD-10-CM

## 2021-01-15 DIAGNOSIS — T81.32XA DEEP DISRUPTION OR DEHISCENCE OF OPERATION WOUND, INITIAL ENCOUNTER: Primary | ICD-10-CM

## 2021-01-15 PROCEDURE — 97597 DBRDMT OPN WND 1ST 20 CM/<: CPT | Performed by: PODIATRIST

## 2021-01-15 PROCEDURE — NC001 PR NO CHARGE: Performed by: PODIATRIST

## 2021-01-15 RX ORDER — LIDOCAINE HYDROCHLORIDE 40 MG/ML
5 SOLUTION TOPICAL ONCE
Status: COMPLETED | OUTPATIENT
Start: 2021-01-15 | End: 2021-01-15

## 2021-01-15 RX ADMIN — LIDOCAINE HYDROCHLORIDE 5 ML: 40 SOLUTION TOPICAL at 09:06

## 2021-01-15 NOTE — PATIENT INSTRUCTIONS
Orders Placed This Encounter   Procedures    Wound cleansing and dressings     Wound cleansing and dressings       Right Foot wound:your sutures were removed at today visit       Applied adaptic, then cover with silver Alginate, secured with foam and tape  followed by Ori Head applied by Dr Inga Rucker and cast to be changed next week at the [facility]  Treatments above were completed today at the [facility]          Standing Status:   Future     Standing Expiration Date:   1/15/2022    Wound off loading     Off-loading Instructions: Total Contact Cast Instructions: Do not get cast wet  Contact wound center if there is a foul odor or becomes uncomfortable due to feeling tight or swelling  Do not use objects down inside of cast to scratch  Do not walk on cast without walking boot , Keep weight and pressure off wound at all times  and Wear off-loading device as directed by your physician  Put on immediately when rising in the morning and remove when going to bed  Standing Status:   Future     Standing Expiration Date:   1/15/2022      TOTAL CONTACT CAST  Total Contact Casting is a method of pressure relief that has been shown to have the highest rate of wound healing  Total Contact Casting is considered the gold standard for reducing foot wound pressure  What is a Total Contact Cast?  This casting method helps promote healing and reduce pressure in the area of the wound  The cast works to redistribute weight, minimize pressure and reduce friction on the foot  The cast consists of two parts  the hard cast and a walking boot  Certain types of wounds heal better and more efficiently with Total Contact Casting  Your doctor has determined that this method of treatment is best for your wound  How long will I be wearing a cast?  The length of treatment varies from patient to patient  We will reassess your wound weekly for continued need for casting  Usually, the cast is changed weekly    Sometimes, as wounds become smaller and drain less, the cast may be changed every 2-3 weeks depending on your doctor's assessment  How will this cast change my lifestyle?  The cast takes 24 hours to cure and we ask that you absolutely minimize weight bearing during this time   Never put anything into the cast such as powders or something to scratch the skin under the cast     You will need to wear the walking boot at all times  you can cover it with a pillow case while in bed  A pillow case on the cast and a long thick sock on your other foot will protect the non-casted foot from getting irritated by the cast    The cast must be kept dry at all times  Do not take showers while the cast is on  Cover the cast with a plastic bag to protect it from getting wet during poor weather  It's VERY important that you call the office immediately if you get the cast wet (or) you feel any type of pain or even mild irritation in the cast    Do not walk without the outer walking boot firmly attached to the cast    Since you will not be walking as much as you normally do, you will need some assistance from family and friends for certain tasks  This is common when wearing any type of cast and you will find that people are eager to assist you while you are taking the necessary time to heal   If you have any questions about what you should or should not be doing, please feel free to contact us  Once your wound is healed, you will need to be fitted for proper footwear to ensure that the wound does not return  You may be casted until this process is completed  It is important to avoid old shoes and habits that may have led to the development of your wound

## 2021-01-15 NOTE — PROGRESS NOTES
Patient ID: Danilo Buck  is a 78 y o  male Date of Birth 1950       Chief Complaint   Patient presents with    Follow Up Wound Care Visit     Right foot wound       DFU    Subjective:   Ryder Morning presents for wound care right foot  He is in a bulky offloading dressing  We are s/p the first stage surgical reconstruction procedure  He's trying to be NWB on crutches but admits to some struggling to stay strictly NWB         The following portions of the patient's history were reviewed and updated as appropriate:   Patient Active Problem List   Diagnosis    Hyperlipidemia    Type 2 diabetes mellitus with diabetic neuropathy ([address] )    Atrial fibrillation with RVR ([address] )    Right middle lobe pulmonary nodule    Essential hypertension    Acquired absence of other right toe(s) ([address] )    Benign prostatic hyperplasia with lower urinary tract symptoms    Positive for microalbuminuria    Congenital talipes equinovarus, right foot    Dehiscence of closure of skin, initial encounter     Past Medical History:   Diagnosis Date    Arthritis     Atrial fibrillation ([address] )     Diagnosed 11/2018    Benign prostate hyperplasia 04/2002    Cellulitis     right lower leg     Colon polyp 2006    Diabetes mellitus ([address] )     Hearing aid worn     bilat    Hearing loss     90% loss left ear and 40% right ear    History of clubfoot     "since birth"    History of pneumonia     History of TIA (transient ischemic attack) 04/25/2017 11/30/18 pt denies    Hyperlipidemia 5/15/2014    Hypertension     Infectious viral hepatitis     "cant remember type"    Irregular heart beat     Neuropathy     both feet    Osteomyelitis ([address] )     right great toe    Right middle lobe pulmonary nodule 11/6/2018    Seasickness     Teeth missing     Type 2 diabetes mellitus with diabetic neuropathy ([address] ) 11/6/2018    Wears glasses     reading    Wound, open     bottom of right foot     Past Surgical History:   Procedure Laterality Date  BUNIONECTOMY Right 2018    Procedure: 5TH METATARSAL BONE PARTIAL RESECTION, FULL THICKNESS DEBRIDEMENT OF DIABETIC ULCER;  Surgeon: Arielle Arriaga DPM;  Location: [facility];  Service: Podiatry    CLUB FOOT RELEASE Bilateral     COLONOSCOPY      OK PART [address] BONE METATARSAL HEAD,EA Right 2020    Procedure: EXCISION EXOSTOSIS;  Surgeon: Sera Fletcher DPM;  Location: [facility];  Service: Veronika Segovia WND EXTEN/COMPLIC Right     Procedure: PRIMARY DELAYED CLOSURE;  Surgeon: Sera Fletcher DPM;  Location: [facility];  Service: Podiatry    TOE AMPUTATION Right     partial great toe    VASECTOMY       Social History     Socioeconomic History    Marital status: /Civil Union     Spouse name: None    Number of children: None    Years of education: None    Highest education level: None   Occupational History    None   Social Needs    Financial resource strain: None    Food insecurity     Worry: None     Inability: None    Transportation needs     Medical: None     Non-medical: None   Tobacco Use    Smoking status: Former Smoker     Types: Cigarettes     Quit date: 1988     Years since quittin 2    Smokeless tobacco: Former User   Substance and Sexual Activity    Alcohol use: Yes     Frequency: 2-4 times a month     Drinks per session: 1 or 2     Comment: socially    Drug use: No    Sexual activity: None   Lifestyle    Physical activity     Days per week: None     Minutes per session: None    Stress: None   Relationships    Social connections     Talks on phone: None     Gets together: None     Attends Restorationist service: None     Active member of club or organization: None     Attends meetings of clubs or organizations: None     Relationship status: None    Intimate partner violence     Fear of current or ex partner: None     Emotionally abused: None     Physically abused: None     Forced sexual activity: None   Other Topics Concern    None   Social History Narrative    None        Current Outpatient Medications:     Acetaminophen (TYLENOL ARTHRITIS PAIN PO), Take 650 mg by mouth 2 (two) times a day, Disp: , Rfl:     atorvastatin (LIPITOR) 40 mg tablet, Take 1 tablet (40 mg total) by mouth daily, Disp: 90 tablet, Rfl: 3    Blood Glucose Monitoring Suppl (ACCU-CHEK JYOTI SMARTVIEW) w/Device KIT, use to check Blood Sugar as needed, Disp: , Rfl:     diltiazem (CARDIZEM) 120 MG tablet, Take 1 tablet (120 mg total) by mouth 2 (two) times a day, Disp: 180 tablet, Rfl: 6    Eliquis 5 MG, Take 1 tablet (5 mg total) by mouth 2 (two) times a day, Disp: 60 tablet, Rfl: 2    glimepiride (AMARYL) 4 mg tablet, Take one tablet by mouth twice daily, Disp: 180 tablet, Rfl: 3    glucose blood (ACCU-CHEK SMARTVIEW) test strip, Use as instructed BID, Disp: 200 each, Rfl: 12    Januvia 100 MG tablet, Take 1 tablet (100 mg total) by mouth daily, Disp: 132 tablet, Rfl: 3    lisinopril (ZESTRIL) 40 mg tablet, Take 1 tablet (40 mg total) by mouth daily, Disp: 90 tablet, Rfl: 3    metFORMIN (GLUCOPHAGE-XR) 750 mg 24 hr tablet, Take 1 tablet (750 mg total) by mouth 2 (two) times a day ONE BID, Disp: 180 tablet, Rfl: 3    oxyCODONE-acetaminophen (PERCOCET) 5-325 mg per tablet, Take 1 tablet by mouth every 4 (four) hours as needed for moderate pain for up to 10 dosesMax Daily Amount: 6 tablets, Disp: 10 tablet, Rfl: 0  Family History   Problem Relation Age of Onset    Diabetes Father         mellitus      Review of Systems   Constitutional: Negative for chills and fever  Respiratory: Negative for cough and shortness of breath  Cardiovascular: Negative for chest pain  Gastrointestinal: Negative for diarrhea, nausea and vomiting  Allergies  Simvastatin and Itraconazole    Objective:  /82   Pulse 88   Temp 97 9 °F (36 6 °C)   Resp 20     Physical Exam  Vitals signs and nursing note reviewed     Constitutional:       General: He is not in acute distress  Appearance: Normal appearance  He is not ill-appearing or toxic-appearing  Cardiovascular:      Rate and Rhythm: Normal rate and regular rhythm  Pulses: Normal pulses  Pulmonary:      Effort: Pulmonary effort is normal  No respiratory distress  Musculoskeletal:         General: No swelling, tenderness or signs of injury  Comments: Right 5th ray amputation  Skin:     Coloration: Skin is not cyanotic or mottled  Findings: No erythema  Rash is not purpuric  Nails: There is no clubbing  Comments: Incision is coapted  There is mild dehiscence proximally without deep probing  Skin slough noted around the incision  No redness or cellulitis  No purulence  See wound assessment  Neurological:      General: No focal deficit present  Mental Status: He is alert and oriented to person, place, and time  Cranial Nerves: No cranial nerve deficit  Motor: No weakness  Psychiatric:         Mood and Affect: Mood normal          Behavior: Behavior normal          Thought Content: Thought content normal          Judgment: Judgment normal            Wound 06/04/20 Foot Right;Lateral (Active)   Wound Image   01/15/21 0918   Wound Description Epithelialization;Eschar;Pink;Slough; Yellow 01/15/21 0901   Lavern-wound Assessment Dry;Callus; Maceration 01/15/21 0901   Wound Length (cm) 7 cm 01/15/21 0901   Wound Width (cm) 2 4 cm 01/15/21 0901   Wound Depth (cm) 0 8 cm 01/15/21 0901   Wound Surface Area (cm^2) 16 8 cm^2 01/15/21 0901   Wound Volume (cm^3) 13 44 cm^3 01/15/21 0901   Calculated Wound Volume (cm^3) 13 44 cm^3 01/15/21 0901   Change in Wound Size % -3633 33 01/15/21 0901   Undermining 0 2 01/15/21 0901   Undermining is depth extending from 3-6 oclock 01/15/21 0901   Wound Site Closure Sutures 01/15/21 0901   Drainage Amount Small 01/15/21 0901   Drainage Description Serosanguineous; Tan 01/15/21 0901   Non-staged Wound Description Full thickness 01/15/21 0901 Treatments Cleansed 11/27/20 0835   Dressing Status Intact; Old drainage 12/18/20 0855           Wound 06/04/20 Foot Right;Lateral (Active)   Wound Image   01/15/21 0918   Wound Description Epithelialization;Eschar;Pink;Slough; Yellow 01/15/21 0901   Lavern-wound Assessment Dry;Callus; Maceration 01/15/21 0901   Wound Length (cm) 7 cm 01/15/21 0901   Wound Width (cm) 2 4 cm 01/15/21 0901   Wound Depth (cm) 0 8 cm 01/15/21 0901   Wound Surface Area (cm^2) 16 8 cm^2 01/15/21 0901   Wound Volume (cm^3) 13 44 cm^3 01/15/21 0901   Calculated Wound Volume (cm^3) 13 44 cm^3 01/15/21 0901   Change in Wound Size % -3633 33 01/15/21 0901   Undermining 0 2 01/15/21 0901   Undermining is depth extending from 3-6 oclock 01/15/21 0901   Wound Site Closure Sutures 01/15/21 0901   Drainage Amount Small 01/15/21 0901   Drainage Description Serosanguineous; Tan 01/15/21 0901   Non-staged Wound Description Full thickness 01/15/21 0901   Treatments Cleansed 11/27/20 0835   Dressing Status Intact; Old drainage 12/18/20 0855                         Diagnosis:  1  Right foot ulcer, with fat layer exposed ([address] )  -     lidocaine (XYLOCAINE) 4 % topical solution 5 mL  -     Wound cleansing and dressings; Future  -     Wound off loading; Future  -     Cast Application  -     Debridement    2  Type 2 diabetes mellitus with diabetic neuropathy, unspecified whether long term insulin use (HCC)  -     Cast Application  -     Debridement    3  Dehiscence of closure of skin, initial encounter        Diagnosis ICD-10-CM Associated Orders   1  Right foot ulcer, with fat layer exposed ([address] )  L97 512 lidocaine (XYLOCAINE) 4 % topical solution 5 mL     Wound cleansing and dressings     Wound off loading     Cast Application     Debridement   2  Type 2 diabetes mellitus with diabetic neuropathy, unspecified whether long term insulin use (HCC)  Z16 06 Cast Application     Debridement   3   Dehiscence of closure of skin, initial encounter  T81 [id number] Assessment:  Post-op incision with mild superficial dehiscence    Plan:    1  Debridement: All sutures removed  Selective debridement performed of wound dehiscence      2  Dressing Order: Adaptic, silver alginate  3  Offloading: TCC  Cast Application    Date/Time: 1/15/2021 10:23 AM  Performed by: Angel Cheatham DPM  Authorized by: Angel Cheatham DPM   Universal Protocol:  Consent: Verbal consent obtained  Risks and benefits: risks, benefits and alternatives were discussed  Consent given by: patient  Time out: Immediately prior to procedure a "time out" was called to verify the correct patient, procedure, equipment, support staff and site/side marked as required  Timeout called at: 1/15/2021 10:23 AM   Patient understanding: patient states understanding of the procedure being performed  Patient identity confirmed: verbally with patient      Pre-procedure details:     Sensation:  Numbness  Procedure details:     Location:  Leg    Leg:  R lower legCast type: Total contact    Supplies:  Fiberglass and cotton padding    Debridement   Wound 06/04/20 Foot Right;Lateral    Universal Protocol:  Consent: Verbal consent obtained  Consent given by: patient  Time out: Immediately prior to procedure a "time out" was called to verify the correct patient, procedure, equipment, support staff and site/side marked as required    Timeout called at: 1/15/2021 10:24 AM   Patient understanding: patient states understanding of the procedure being performed      Performed by: physician  Debridement type: selective  Pain control: lidocaine 4%  Pre-debridement measurements  Length (cm): 7  Width (cm): 2 4  Depth (cm): 0 8  Surface Area (cm^2): 16 8  Volume (cm^3): 13 44    Post-debridement measurements  Length (cm): 7  Width (cm): 2 4  Depth (cm): 0 8  Percent debrided: 20%  Surface Area (cm^2): 16 8  Area debrided (cm^2): 3 36  Volume (cm^3): 13 44  Devitalized tissue debrided: callus and fibrin  Instrument(s) utilized: blade  Bleeding: none  Hemostasis obtained with: not applicable  Procedural pain (0-10): insensate  Post-procedural pain: insensate   Response to treatment: procedure was tolerated well          4  Compression: None  5  Diagnostics  / Consults: None  6  Prescriptions: Non3  7     Future Goal(s): Healing of the wound dehiscence, eventual second stage foot reconstruction

## 2021-01-19 PROBLEM — L97.512 RIGHT FOOT ULCER, WITH FAT LAYER EXPOSED (HCC): Status: RESOLVED | Noted: 2020-08-14 | Resolved: 2021-01-19

## 2021-01-22 ENCOUNTER — OFFICE VISIT (OUTPATIENT)
Dept: WOUND CARE | Facility: HOSPITAL | Age: 71
End: 2021-01-22
Payer: COMMERCIAL

## 2021-01-22 VITALS
HEART RATE: 76 BPM | DIASTOLIC BLOOD PRESSURE: 80 MMHG | TEMPERATURE: 97.9 F | SYSTOLIC BLOOD PRESSURE: 140 MMHG | RESPIRATION RATE: 18 BRPM

## 2021-01-22 DIAGNOSIS — Z01.818 PREOP TESTING: ICD-10-CM

## 2021-01-22 DIAGNOSIS — Q66.01 CONGENITAL TALIPES EQUINOVARUS, RIGHT FOOT: ICD-10-CM

## 2021-01-22 DIAGNOSIS — T81.32XA DEEP DISRUPTION OR DEHISCENCE OF OPERATION WOUND, INITIAL ENCOUNTER: Primary | ICD-10-CM

## 2021-01-22 DIAGNOSIS — E11.40 TYPE 2 DIABETES MELLITUS WITH DIABETIC NEUROPATHY, UNSPECIFIED WHETHER LONG TERM INSULIN USE (HCC): Chronic | ICD-10-CM

## 2021-01-22 PROCEDURE — 99214 OFFICE O/P EST MOD 30 MIN: CPT | Performed by: PODIATRIST

## 2021-01-22 PROCEDURE — 29445 APPL RIGID TOT CNTC LEG CAST: CPT | Performed by: PODIATRIST

## 2021-01-22 RX ORDER — LIDOCAINE HYDROCHLORIDE 40 MG/ML
5 SOLUTION TOPICAL ONCE
Status: COMPLETED | OUTPATIENT
Start: 2021-01-22 | End: 2021-01-22

## 2021-01-22 RX ORDER — CEFAZOLIN SODIUM 2 G/50ML
2000 SOLUTION INTRAVENOUS ONCE
Status: CANCELLED | OUTPATIENT
Start: 2021-02-12

## 2021-01-22 RX ADMIN — LIDOCAINE HYDROCHLORIDE 5 ML: 40 SOLUTION TOPICAL at 09:29

## 2021-01-22 NOTE — PROGRESS NOTES
Patient ID: Sukhdeep Abebe  is a 78 y o  male Date of Birth 1950       Chief Complaint   Patient presents with    Follow Up Wound Care Visit     right fooot wound       Foot wound with deformity Right    Subjective:   Serenitya No presents for wound care right foot  He is in a TCC  We are s/p the first stage surgical reconstruction procedure         The following portions of the patient's history were reviewed and updated as appropriate:   Patient Active Problem List   Diagnosis    Hyperlipidemia    Type 2 diabetes mellitus with diabetic neuropathy ([address] )    Atrial fibrillation with RVR ([address] )    Right middle lobe pulmonary nodule    Essential hypertension    Acquired absence of other right toe(s) ([address] )    Benign prostatic hyperplasia with lower urinary tract symptoms    Positive for microalbuminuria    Congenital talipes equinovarus, right foot    Dehiscence of closure of skin, initial encounter     Past Medical History:   Diagnosis Date    Arthritis     Atrial fibrillation ([address] )     Diagnosed 11/2018    Benign prostate hyperplasia 04/2002    Cellulitis     right lower leg     Colon polyp 2006    Diabetes mellitus ([address] )     Hearing aid worn     bilat    Hearing loss     90% loss left ear and 40% right ear    History of clubfoot     "since birth"    History of pneumonia     History of TIA (transient ischemic attack) 04/25/2017 11/30/18 pt denies    Hyperlipidemia 5/15/2014    Hypertension     Infectious viral hepatitis     "cant remember type"    Irregular heart beat     Neuropathy     both feet    Osteomyelitis ([address] )     right great toe    Right middle lobe pulmonary nodule 11/6/2018    Seasickness     Teeth missing     Type 2 diabetes mellitus with diabetic neuropathy ([address] ) 11/6/2018    Wears glasses     reading    Wound, open     bottom of right foot     Past Surgical History:   Procedure Laterality Date    BUNIONECTOMY Right 12/4/2018    Procedure: 5TH METATARSAL BONE PARTIAL RESECTION, FULL THICKNESS DEBRIDEMENT OF DIABETIC ULCER;  Surgeon: Precious Castillo DPM;  [facility];  Service: Podiatry    CLUB FOOT RELEASE Bilateral     COLONOSCOPY      [place] PART [address] BONE METATARSAL HEAD,EA Right 2020    Procedure: EXCISION EXOSTOSIS;  Surgeon: Ron Zaman DPM;  [facility];  Service: Maritza Michaels WND EXTEN/COMPLIC Right 1551    Procedure: PRIMARY DELAYED CLOSURE;  Surgeon: Ron Zaman DPM;  [facility];  Service: Podiatry    TOE AMPUTATION Right     partial great toe    VASECTOMY       Social History     Socioeconomic History    Marital status: /Civil Union     Spouse name: None    Number of children: None    Years of education: None    Highest education level: None   Occupational History    None   Social Needs    Financial resource strain: None    Food insecurity     Worry: None     Inability: None    Transportation needs     Medical: None     Non-medical: None   Tobacco Use    Smoking status: Former Smoker     Types: Cigarettes     Quit date: 1988     Years since quittin 2    Smokeless tobacco: Former User   Substance and Sexual Activity    Alcohol use: Yes     Frequency: 2-4 times a month     Drinks per session: 1 or 2     Comment: socially    Drug use: No    Sexual activity: None   Lifestyle    Physical activity     Days per week: None     Minutes per session: None    Stress: None   Relationships    Social connections     Talks on phone: None     Gets together: None     Attends Latter day service: None     Active member of club or organization: None     Attends meetings of clubs or organizations: None     Relationship status: None    Intimate partner violence     Fear of current or ex partner: None     Emotionally abused: None     Physically abused: None     Forced sexual activity: None   Other Topics Concern    None   Social History Narrative    None        Current Outpatient Medications:     Acetaminophen (TYLENOL ARTHRITIS PAIN PO), Take 650 mg by mouth 2 (two) times a day, Disp: , Rfl:     atorvastatin (LIPITOR) 40 mg tablet, Take 1 tablet (40 mg total) by mouth daily, Disp: 90 tablet, Rfl: 3    Blood Glucose Monitoring Suppl (ACCU-CHEK JYOTI SMARTVIEW) w/Device KIT, use to check Blood Sugar as needed, Disp: , Rfl:     diltiazem (CARDIZEM) 120 MG tablet, Take 1 tablet (120 mg total) by mouth 2 (two) times a day, Disp: 180 tablet, Rfl: 6    Eliquis 5 MG, Take 1 tablet (5 mg total) by mouth 2 (two) times a day, Disp: 60 tablet, Rfl: 2    glimepiride (AMARYL) 4 mg tablet, Take one tablet by mouth twice daily, Disp: 180 tablet, Rfl: 3    glucose blood (ACCU-CHEK SMARTVIEW) test strip, Use as instructed BID, Disp: 200 each, Rfl: 12    Januvia 100 MG tablet, Take 1 tablet (100 mg total) by mouth daily, Disp: 132 tablet, Rfl: 3    lisinopril (ZESTRIL) 40 mg tablet, Take 1 tablet (40 mg total) by mouth daily, Disp: 90 tablet, Rfl: 3    metFORMIN (GLUCOPHAGE-XR) 750 mg 24 hr tablet, Take 1 tablet (750 mg total) by mouth 2 (two) times a day ONE BID, Disp: 180 tablet, Rfl: 3    oxyCODONE-acetaminophen (PERCOCET) 5-325 mg per tablet, Take 1 tablet by mouth every 4 (four) hours as needed for moderate pain for up to 10 dosesMax Daily Amount: 6 tablets, Disp: 10 tablet, Rfl: 0  No current facility-administered medications for this visit  Family History   Problem Relation Age of Onset    Diabetes Father         mellitus      Review of Systems   Constitutional: Negative for chills and fever  Respiratory: Negative for cough and shortness of breath  Cardiovascular: Negative for chest pain  Gastrointestinal: Negative for diarrhea, nausea and vomiting  Allergies  Simvastatin and Itraconazole    Objective:  /80   Pulse 76   Temp 97 9 °F (36 6 °C)   Resp 18     Physical Exam  Vitals signs and nursing note reviewed     Constitutional:       General: He is not in acute distress  Appearance: Normal appearance  He is not ill-appearing or toxic-appearing  Cardiovascular:      Rate and Rhythm: Normal rate and regular rhythm  Pulses: Normal pulses  Dorsalis pedis pulses are 2+ on the right side and 2+ on the left side  Posterior tibial pulses are 2+ on the right side and 2+ on the left side  Pulmonary:      Effort: Pulmonary effort is normal  No respiratory distress  Musculoskeletal:         General: No swelling, tenderness or signs of injury  Comments: Right 5th ray amputation  I note a "C" shaped foot deformity of the Right with the apex at the midfoot and significant cavovarus deformity with metadductus  Feet:      Right foot:      Protective Sensation: 10 sites tested  0 sites sensed  Left foot:      Protective Sensation: 10 sites tested  0 sites sensed  Skin:     Coloration: Skin is not cyanotic or mottled  Findings: No erythema  Rash is not purpuric  Nails: There is no clubbing  Comments: Incision is coapted  There is mild dehiscence proximally without deep probing  Skin slough noted around the incision  No redness or cellulitis  No purulence  See wound assessment  Neurological:      Mental Status: He is alert and oriented to person, place, and time  Cranial Nerves: No cranial nerve deficit  Sensory: Sensory deficit present  Motor: No weakness  Psychiatric:         Mood and Affect: Mood normal          Behavior: Behavior normal          Thought Content: Thought content normal          Judgment: Judgment normal            Wound 06/04/20 Foot Right;Lateral (Active)   Wound Image   01/22/21 0927   Wound Description Epithelialization;Pink;Yellow 01/22/21 0927   Lavern-wound Assessment Callus; Maceration;Edema 01/22/21 0927   Wound Length (cm) 1 5 cm 01/22/21 0927   Wound Width (cm) 0 5 cm 01/22/21 0927   Wound Depth (cm) 0 3 cm 01/22/21 0927   Wound Surface Area (cm^2) 0 75 cm^2 01/22/21 1794 Wound Volume (cm^3) 0 23 cm^3 01/22/21 0927   Calculated Wound Volume (cm^3) 0 23 cm^3 01/22/21 0927   Change in Wound Size % 36 11 01/22/21 0927   Undermining 0 2 01/15/21 0901   Undermining is depth extending from 3-6 oclock 01/15/21 0901   Wound Site Closure Sutures 01/15/21 0901   Drainage Amount Large 01/22/21 0927   Drainage Description Sanguineous; Tan 01/22/21 0927   Non-staged Wound Description Full thickness 01/22/21 0927   Treatments Cleansed 11/27/20 0835   Dressing Status Intact; Old drainage 12/18/20 0855           Wound 06/04/20 Foot Right;Lateral (Active)   Wound Image   01/22/21 0927   Wound Description Epithelialization;Pink;Yellow 01/22/21 0927   Lavern-wound Assessment Callus; Maceration;Edema 01/22/21 0927   Wound Length (cm) 1 5 cm 01/22/21 0927   Wound Width (cm) 0 5 cm 01/22/21 0927   Wound Depth (cm) 0 3 cm 01/22/21 0927   Wound Surface Area (cm^2) 0 75 cm^2 01/22/21 0927   Wound Volume (cm^3) 0 23 cm^3 01/22/21 0927   Calculated Wound Volume (cm^3) 0 23 cm^3 01/22/21 0927   Change in Wound Size % 36 11 01/22/21 0927   Undermining 0 2 01/15/21 0901   Undermining is depth extending from 3-6 oclock 01/15/21 0901   Wound Site Closure Sutures 01/15/21 0901   Drainage Amount Large 01/22/21 0927   Drainage Description Sanguineous; Tan 01/22/21 0927   Non-staged Wound Description Full thickness 01/22/21 0927   Treatments Cleansed 11/27/20 0835   Dressing Status Intact; Old drainage 12/18/20 0855                         Diagnosis:  1  Type 2 diabetes mellitus with diabetic neuropathy, unspecified whether long term insulin use ([facility] )    2  Deep disruption or dehiscence of operation wound, initial encounter  -     lidocaine (XYLOCAINE) 4 % topical solution 5 mL  -     Wound cleansing and dressings; Future    3  Congenital talipes equinovarus, right foot        Diagnosis ICD-10-CM Associated Orders   1   Type 2 diabetes mellitus with diabetic neuropathy, unspecified whether long term insulin use (HCC)  E11 40 2  Deep disruption or dehiscence of operation wound, initial encounter  T81  32XA lidocaine (XYLOCAINE) 4 % topical solution 5 mL     Wound cleansing and dressings   3  Congenital talipes equinovarus, right foot  Q66 01         ASSESSMENT    Problems:    Stable Chronic Problem Addressed:  1  Diabetic peripheral neuropathy    Chronic illness / Problem not at goal with severe exacerbation, progression, or side effects of treatment and has significant risk of morbidity and may require hospital level of care  1  Right foot surgical wound not healed but improved  2  Congenital Right foot deformity      PLAN    Data Reviewed / Tests Ordered / Procedures Performed / Recommendations:    1  Independent Interpretation of Test  I reviewed the xrays of the Right foot dated 12/29/20 noting an Morales's angle of 16 8 degrees, and prior partial resection of the 5th metatarsal    2  Procedures Performed  I recommended application of the TCC to facilitate healing particularly in light of the failure to heal previously without TCC  After application of the primary dressing, a TCC was applied in a modified Carville method using stockinette, cast padding between the toes, foam over the toes, cast padding toes to knee, additional felt over the [facility] and tibial crest and finally with the foot at a 90 degree angle to the leg, a well-formed fiberglass shell with additional thickness added plantarly to avoid breakdown  A cast shoe was applied for traction  Strict instructions to keep the cast clean,dry and intact were given and also instructions not to introduce powders or foreign objects into the cast was given  Instructions were given to call if any cast breakdown, or any irritation, odor, drainage or pain was noted within the cast      I explained that good wound care and compliance are necessary to allow healing and to prevent toe loss or limb loss       No guarantees of wound healing were given and I explained clearly that there is some risk with the use of TCC such as cast "rub", cast ulcers, or hidden infection but that the benefits of the TCC far outweight the risks if patient compliance is in place  Cast Application    Date/Time: 1/22/2021 10:49 AM  Performed by: Mildred Pearce DPM  Authorized by: Mildred Pearce DPM   Universal Protocol:  Consent: Verbal consent obtained  Risks and benefits: risks, benefits and alternatives were discussed  Consent given by: patient  Time out: Immediately prior to procedure a "time out" was called to verify the correct patient, procedure, equipment, support staff and site/side marked as required  Timeout called at: 1/22/2021 10:49 AM   Patient understanding: patient states understanding of the procedure being performed  Patient identity confirmed: verbally with patient      Pre-procedure details:     Sensation:  Numbness  Procedure details:     Location:  Leg    Leg:  R lower legCast type: Total contact    Supplies:  Cotton padding and fiberglass        3  Discussion Regarding Surgery    In light of the improvement of the wound, I feel it will be healed and allow us to move ahead with the second stage reconstructive surgery to straighten the foot  I was very clear at the beginning of the discussion about alternatives to this surgery including benign neglect, bracing, and second surgical opinions  I spent time to discuss with the patient the surgical procedure(s) as osteotomy and fusion of the midfoot with internal and/or external fixation, pre-op testing, and post-op course (minimum 6 weeks strict NWB in wheelchair until external fixation is removed)required to properly heal the surgery  I explained that diabetes is a risk factor for surgical complications    I discussed risks as infection, scar, swelling, chronic pain, painful or prominent hardware, possible need to remove hardware, poor healing of incision or bone that could require more surgery, incomplete correction of deformities or recurrence of deformities, change in shape of foot, toe, or walking and function, numbness, neuritis/RSD, blood clots in the leg or lung, and even death from anesthesia complications  No guarantees were given and the possibility of recurrent deformity or incomplete correction were discussed before patient signed the consent form  We also discussed the need for possible anticoagulation  The offloading device necessary after the surgery will be 2/12/21  The surgery, history and physical and PATS will be scheduled

## 2021-01-22 NOTE — PATIENT INSTRUCTIONS
Orders Placed This Encounter   Procedures    Wound cleansing and dressings     Right Foot wound:  Washed with soap and water, patted dry  Skin prep to michael wound  Applied adaptic, then cover with silver Alginate, secured with foam and tape  followed by tcc ez  tcc ez applied by Dr Dinh Loudon    Treatments above were completed today at the [facility]    Wound off loading  Off-loading Instructions: Total Contact Cast Instructions: Do not get cast wet  Contact wound center if there is a foul odor or becomes  uncomfortable due to feeling tight or swelling  Do not use objects down inside of cast to scratch  Do not walk  on cast without walking boot , Keep weight and pressure off wound at all times  and Wear off-loading device  as directed by your physician  Put on immediately when rising in the morning and remove when going to  bed       Standing Status:   Future     Standing Expiration Date:   1/22/2022

## 2021-01-28 ENCOUNTER — CONSULT (OUTPATIENT)
Dept: FAMILY MEDICINE CLINIC | Facility: CLINIC | Age: 71
End: 2021-01-28
Payer: COMMERCIAL

## 2021-01-28 VITALS
HEIGHT: 76 IN | TEMPERATURE: 97.6 F | SYSTOLIC BLOOD PRESSURE: 140 MMHG | RESPIRATION RATE: 16 BRPM | BODY MASS INDEX: 29.35 KG/M2 | OXYGEN SATURATION: 98 % | WEIGHT: 241 LBS | HEART RATE: 78 BPM | DIASTOLIC BLOOD PRESSURE: 78 MMHG

## 2021-01-28 DIAGNOSIS — E78.5 HYPERLIPIDEMIA, UNSPECIFIED HYPERLIPIDEMIA TYPE: Chronic | ICD-10-CM

## 2021-01-28 DIAGNOSIS — I10 ESSENTIAL HYPERTENSION: Chronic | ICD-10-CM

## 2021-01-28 DIAGNOSIS — I48.91 ATRIAL FIBRILLATION WITH RVR (HCC): ICD-10-CM

## 2021-01-28 DIAGNOSIS — E11.40 TYPE 2 DIABETES MELLITUS WITH DIABETIC NEUROPATHY, UNSPECIFIED WHETHER LONG TERM INSULIN USE (HCC): Primary | Chronic | ICD-10-CM

## 2021-01-28 PROCEDURE — 3077F SYST BP >= 140 MM HG: CPT | Performed by: FAMILY MEDICINE

## 2021-01-28 PROCEDURE — 3078F DIAST BP <80 MM HG: CPT | Performed by: FAMILY MEDICINE

## 2021-01-28 PROCEDURE — 1101F PT FALLS ASSESS-DOCD LE1/YR: CPT | Performed by: FAMILY MEDICINE

## 2021-01-28 PROCEDURE — 3288F FALL RISK ASSESSMENT DOCD: CPT | Performed by: FAMILY MEDICINE

## 2021-01-28 PROCEDURE — 3008F BODY MASS INDEX DOCD: CPT | Performed by: FAMILY MEDICINE

## 2021-01-28 PROCEDURE — 1160F RVW MEDS BY RX/DR IN RCRD: CPT | Performed by: FAMILY MEDICINE

## 2021-01-28 PROCEDURE — 3725F SCREEN DEPRESSION PERFORMED: CPT | Performed by: FAMILY MEDICINE

## 2021-01-28 PROCEDURE — 1036F TOBACCO NON-USER: CPT | Performed by: FAMILY MEDICINE

## 2021-01-28 PROCEDURE — 99214 OFFICE O/P EST MOD 30 MIN: CPT | Performed by: FAMILY MEDICINE

## 2021-01-28 NOTE — PROGRESS NOTES
FAMILY PRACTICE PRE-OPERATIVE EVALUATION  [facility] - [facility]    NAME: Aroldo Tsang  AGE: 78 y o  SEX: male  : 1950     DATE: 2021    Family Practice Pre-Operative Evaluation      Chief Complaint: Pre-operative Evaluation     Surgery: podiatric surgery  Anticipated Date of Surgery: 21     History of Present Illness:     Patient has no prior history of bleeding issues or blood clots  Chronic conditions, medications and allergies were reviewed  There is no currently active infectious process  Assessment of Cardiac Risk:  · No unstable or severe angina or MI in the last 6 weeks or history of stent placement in the last year   · No decompensated heart failure (e g  New onset heart failure, NYHA functional class IV heart failure, or worsening existing heart failure) in past 3 mos  · No severe heart valve disease including aortic stenosis or symptomatic mitral stenosis     Exercise Capacity:  · Able to walk 4 blocks without symptoms  · Able to walk 2 flights without symptoms    NO Prior Anesthesia Reactions       No prolonged steroid use in past 6 mos  P A T  If done reviewed  Review of Systems:     Review of Systems   Constitutional: Negative for activity change and appetite change  HENT: Negative for trouble swallowing  Eyes: Negative for visual disturbance  Respiratory: Negative for cough and shortness of breath  Cardiovascular: Negative for chest pain, palpitations and leg swelling  Gastrointestinal: Negative for abdominal pain and blood in stool  Endocrine: Negative for polyuria  Genitourinary: Negative for difficulty urinating and hematuria  Skin: Negative for rash  Neurological: Negative for dizziness  Psychiatric/Behavioral: Negative for behavioral problems         Current Problem List:     Patient Active Problem List   Diagnosis    Hyperlipidemia    Type 2 diabetes mellitus with diabetic neuropathy [facility])    Atrial fibrillation with RVR ([address] )    Right middle lobe pulmonary nodule    Essential hypertension    Acquired absence of other right toe(s) (HCC)    Benign prostatic hyperplasia with lower urinary tract symptoms    Positive for microalbuminuria    Congenital talipes equinovarus, right foot    Dehiscence of closure of skin, initial encounter       Allergies: Allergies   Allergen Reactions    Simvastatin Myalgia    Itraconazole      Pt denies knowledge of allergy           Current Medications:       Current Outpatient Medications:     Acetaminophen (TYLENOL ARTHRITIS PAIN PO), Take 650 mg by mouth 2 (two) times a day, Disp: , Rfl:     atorvastatin (LIPITOR) 40 mg tablet, Take 1 tablet (40 mg total) by mouth daily, Disp: 90 tablet, Rfl: 3    Blood Glucose Monitoring Suppl (ACCU-CHEK JYOTI SMARTVIEW) w/Device KIT, use to check Blood Sugar as needed, Disp: , Rfl:     diltiazem (CARDIZEM) 120 MG tablet, Take 1 tablet (120 mg total) by mouth 2 (two) times a day, Disp: 180 tablet, Rfl: 6    Eliquis 5 MG, Take 1 tablet (5 mg total) by mouth 2 (two) times a day, Disp: 60 tablet, Rfl: 2    glimepiride (AMARYL) 4 mg tablet, Take one tablet by mouth twice daily, Disp: 180 tablet, Rfl: 3    glucose blood (ACCU-CHEK SMARTVIEW) test strip, Use as instructed BID, Disp: 200 each, Rfl: 12    Januvia 100 MG tablet, Take 1 tablet (100 mg total) by mouth daily, Disp: 132 tablet, Rfl: 3    lisinopril (ZESTRIL) 40 mg tablet, Take 1 tablet (40 mg total) by mouth daily, Disp: 90 tablet, Rfl: 3    metFORMIN (GLUCOPHAGE-XR) 750 mg 24 hr tablet, Take 1 tablet (750 mg total) by mouth 2 (two) times a day ONE BID, Disp: 180 tablet, Rfl: 3    oxyCODONE-acetaminophen (PERCOCET) 5-325 mg per tablet, Take 1 tablet by mouth every 4 (four) hours as needed for moderate pain for up to 10 dosesMax Daily Amount: 6 tablets, Disp: 10 tablet, Rfl: 0    Past Medical History:       Past Medical History:   Diagnosis Date    Arthritis     Atrial fibrillation ([facility] )     Diagnosed 11/2018    Benign prostate hyperplasia 04/2002    Cellulitis     right lower leg     Colon polyp 2006    Diabetes mellitus ([facility] )     Hearing aid worn     bilat    Hearing loss     90% loss left ear and 40% right ear    History of clubfoot     "since birth"    History of pneumonia     History of TIA (transient ischemic attack) 04/25/2017 11/30/18 pt denies    Hyperlipidemia 5/15/2014    Hypertension     Infectious viral hepatitis     "cant remember type"    Irregular heart beat     Neuropathy     both feet    [facility]     right great toe    Right middle lobe pulmonary nodule 11/6/2018    Seasickness     Teeth missing     Type 2 diabetes mellitus with diabetic neuropathy ([facility] ) 11/6/2018    Wears glasses     reading    Wound, open     bottom of right foot        Past Surgical History:   Procedure Laterality Date    BUNIONECTOMY Right 12/4/2018    Procedure: 5TH METATARSAL BONE PARTIAL RESECTION, FULL THICKNESS DEBRIDEMENT OF DIABETIC ULCER;  Surgeon: Og Jauregui DPM;  [facility];  Service: Podiatry    CLUB FOOT RELEASE Bilateral     COLONOSCOPY      ME PART [address] [facility] BONE METATARSAL HEAD,EA Right 12/29/2020    Procedure: EXCISION EXOSTOSIS;  Surgeon: Franki Duckworth DPM;  [facility];  Service: Tina Dueñas WND EXTEN/COMPLIC Right 30/09/2508    Procedure: PRIMARY DELAYED CLOSURE;  Surgeon: Franki Duckworth DPM;  [facility];  Service: Podiatry    TOE AMPUTATION Right     partial great toe    VASECTOMY  1992        Family History   Problem Relation Age of Onset    Diabetes Father         mellitus        Social History     Socioeconomic History    Marital status: /Civil Union     Spouse name: Not on file    Number of children: Not on file    Years of education: Not on file    Highest education level: Not on file   Occupational History    Not on file   Social Needs    Financial resource strain: Not on file    Food insecurity     Worry: Not on file     Inability: Not on file    Transportation needs     Medical: Not on file     Non-medical: Not on file   Tobacco Use    Smoking status: Former Smoker     Types: Cigarettes     Quit date: 1988     Years since quittin 2    Smokeless tobacco: Former User   Substance and Sexual Activity    Alcohol use: Yes     Frequency: 2-4 times a month     Drinks per session: 1 or 2     Comment: socially    Drug use: No    Sexual activity: Not on file   Lifestyle    Physical activity     Days per week: Not on file     Minutes per session: Not on file    Stress: Not on file   Relationships    Social connections     Talks on phone: Not on file     Gets together: Not on file     Attends Buddhist service: Not on file     Active member of club or organization: Not on file     Attends meetings of clubs or organizations: Not on file     Relationship status: Not on file    Intimate partner violence     Fear of current or ex partner: Not on file     Emotionally abused: Not on file     Physically abused: Not on file     Forced sexual activity: Not on file   Other Topics Concern    Not on file   Social History Narrative    Not on file        Physical Exam:     /78 (BP Location: Left arm, Patient Position: Sitting, Cuff Size: Standard)   Pulse 78   Temp 97 6 °F (36 4 °C) (Temporal)   Resp 16   Ht 6' 4" (1 93 m)   Wt 109 kg (241 lb)   SpO2 98%   BMI 29 34 kg/m²     Physical Exam  Constitutional:       Appearance: He is well-developed  HENT:      Head: Normocephalic and atraumatic  Eyes:      Conjunctiva/sclera: Conjunctivae normal    Neck:      Musculoskeletal: Neck supple  Thyroid: No thyromegaly  Cardiovascular:      Rate and Rhythm: Normal rate and regular rhythm  Heart sounds: Normal heart sounds  No murmur  Pulmonary:      Effort: Pulmonary effort is normal  No respiratory distress        Breath sounds: Normal breath sounds  Lymphadenopathy:      Cervical: No cervical adenopathy  Skin:     General: Skin is warm and dry  Psychiatric:         Behavior: Behavior normal          Operative site has been examined and clear of skin infection and inflammation  Assessment & Recommendations:     Patient is cleared for surgery as detailed above  Surgical Procedure risk category: low    Patient specific operative risk categegory: moderate

## 2021-01-28 NOTE — H&P (VIEW-ONLY)
[facility] PRE-OPERATIVE EVALUATION  [facility] - [facility]    NAME: Guille Boyce  AGE: 78 y o  SEX: male  : 1950     DATE: 2021    [facility] Pre-Operative Evaluation      Chief Complaint: Pre-operative Evaluation     Surgery: podiatric surgery  Anticipated Date of Surgery: 21     History of Present Illness:     Patient has no prior history of bleeding issues or blood clots  Chronic conditions, medications and allergies were reviewed  There is no currently active infectious process  Assessment of Cardiac Risk:  · No unstable or severe angina or MI in the last 6 weeks or history of stent placement in the last year   · No decompensated heart failure (e g  New onset heart failure, NYHA functional class IV heart failure, or worsening existing heart failure) in past 3 mos  · No severe heart valve disease including aortic stenosis or symptomatic mitral stenosis     Exercise Capacity:  · Able to walk 4 blocks without symptoms  · Able to walk 2 flights without symptoms    NO Prior Anesthesia Reactions       No prolonged steroid use in past 6 mos  P A T  If done reviewed  Review of Systems:     Review of Systems   Constitutional: Negative for activity change and appetite change  HENT: Negative for trouble swallowing  Eyes: Negative for visual disturbance  Respiratory: Negative for cough and shortness of breath  Cardiovascular: Negative for chest pain, palpitations and leg swelling  Gastrointestinal: Negative for abdominal pain and blood in stool  Endocrine: Negative for polyuria  Genitourinary: Negative for difficulty urinating and hematuria  Skin: Negative for rash  Neurological: Negative for dizziness  Psychiatric/Behavioral: Negative for behavioral problems         Current Problem List:     Patient Active Problem List   Diagnosis    Hyperlipidemia    Type 2 diabetes mellitus with diabetic neuropathy [facility])    Atrial fibrillation with RVR ([address] )    Right middle lobe pulmonary nodule    Essential hypertension    Acquired absence of other right toe(s) (HCC)    Benign prostatic hyperplasia with lower urinary tract symptoms    Positive for microalbuminuria    Congenital talipes equinovarus, right foot    Dehiscence of closure of skin, initial encounter       Allergies: Allergies   Allergen Reactions    Simvastatin Myalgia    Itraconazole      Pt denies knowledge of allergy           Current Medications:       Current Outpatient Medications:     Acetaminophen (TYLENOL ARTHRITIS PAIN PO), Take 650 mg by mouth 2 (two) times a day, Disp: , Rfl:     atorvastatin (LIPITOR) 40 mg tablet, Take 1 tablet (40 mg total) by mouth daily, Disp: 90 tablet, Rfl: 3    Blood Glucose Monitoring Suppl (ACCU-CHEK JYOTI SMARTVIEW) w/Device KIT, use to check Blood Sugar as needed, Disp: , Rfl:     diltiazem (CARDIZEM) 120 MG tablet, Take 1 tablet (120 mg total) by mouth 2 (two) times a day, Disp: 180 tablet, Rfl: 6    Eliquis 5 MG, Take 1 tablet (5 mg total) by mouth 2 (two) times a day, Disp: 60 tablet, Rfl: 2    glimepiride (AMARYL) 4 mg tablet, Take one tablet by mouth twice daily, Disp: 180 tablet, Rfl: 3    glucose blood (ACCU-CHEK SMARTVIEW) test strip, Use as instructed BID, Disp: 200 each, Rfl: 12    Januvia 100 MG tablet, Take 1 tablet (100 mg total) by mouth daily, Disp: 132 tablet, Rfl: 3    lisinopril (ZESTRIL) 40 mg tablet, Take 1 tablet (40 mg total) by mouth daily, Disp: 90 tablet, Rfl: 3    metFORMIN (GLUCOPHAGE-XR) 750 mg 24 hr tablet, Take 1 tablet (750 mg total) by mouth 2 (two) times a day ONE BID, Disp: 180 tablet, Rfl: 3    oxyCODONE-acetaminophen (PERCOCET) 5-325 mg per tablet, Take 1 tablet by mouth every 4 (four) hours as needed for moderate pain for up to 10 dosesMax Daily Amount: 6 tablets, Disp: 10 tablet, Rfl: 0    Past Medical History:       Past Medical History:   Diagnosis Date    Arthritis     Atrial fibrillation ([facility] )     Diagnosed 11/2018    Benign prostate hyperplasia 04/2002    Cellulitis     right lower leg     Colon polyp 2006    Diabetes mellitus ([facility] )     Hearing aid worn     bilat    Hearing loss     90% loss left ear and 40% right ear    History of clubfoot     "since birth"    History of pneumonia     History of TIA (transient ischemic attack) 04/25/2017 11/30/18 pt denies    Hyperlipidemia 5/15/2014    Hypertension     Infectious viral hepatitis     "cant remember type"    Irregular heart beat     Neuropathy     both feet    [facility]el[facility] )     right great toe    Right middle lobe pulmonary nodule 11/6/2018    Seasickness     Teeth missing     Type 2 diabetes mellitus with diabetic neuropathy ([facility] ) 11/6/2018    Wears glasses     reading    Wound, open     bottom of right foot        Past Surgical History:   Procedure Laterality Date    BUNIONECTOMY Right 12/4/2018    Procedure: 5TH METATARSAL BONE PARTIAL RESECTION, FULL THICKNESS DEBRIDEMENT OF DIABETIC ULCER;  Surgeon: Martine Harding DPM;  [facility];  Service: Podiatry    CLUB FOOT RELEASE Bilateral     COLONOSCOPY      ND PART [address] BONE METATARSAL HEAD,EA Right 12/29/2020    Procedure: EXCISION EXOSTOSIS;  Surgeon: Cathy Haskins DPM;  [facility];  Service: Ibeth Avalos WND EXTEN/COMPLIC Right 60/79/7271    Procedure: PRIMARY DELAYED CLOSURE;  Surgeon: Cathy Haskins DPM;  [facility];  Service: Podiatry    TOE AMPUTATION Right     partial great toe    VASECTOMY  1992        Family History   Problem Relation Age of Onset    Diabetes Father         mellitus        Social History     Socioeconomic History    Marital status: /Civil Union     Spouse name: Not on file    Number of children: Not on file    Years of education: Not on file    Highest education level: Not on file   Occupational History    Not on file   Social Needs    Financial resource strain: Not on file    Food insecurity     Worry: Not on file     Inability: Not on file    Transportation needs     Medical: Not on file     Non-medical: Not on file   Tobacco Use    Smoking status: Former Smoker     Types: Cigarettes     Quit date: 1988     Years since quittin 2    Smokeless tobacco: Former User   Substance and Sexual Activity    Alcohol use: Yes     Frequency: 2-4 times a month     Drinks per session: 1 or 2     Comment: socially    Drug use: No    Sexual activity: Not on file   Lifestyle    Physical activity     Days per week: Not on file     Minutes per session: Not on file    Stress: Not on file   Relationships    Social connections     Talks on phone: Not on file     Gets together: Not on file     Attends [employer] service: Not on file     Active member of club or organization: Not on file     Attends meetings of clubs or organizations: Not on file     Relationship status: Not on file    Intimate partner violence     Fear of current or ex partner: Not on file     Emotionally abused: Not on file     Physically abused: Not on file     Forced sexual activity: Not on file   Other Topics Concern    Not on file   Social History Narrative    Not on file        Physical Exam:     /78 (BP Location: Left arm, Patient Position: Sitting, Cuff Size: Standard)   Pulse 78   Temp 97 6 °F (36 4 °C) (Temporal)   Resp 16   Ht 6' 4" (1 93 m)   Wt 109 kg (241 lb)   SpO2 98%   BMI 29 34 kg/m²     Physical Exam  Constitutional:       Appearance: He is well-developed  HENT:      Head: Normocephalic and atraumatic  Eyes:      Conjunctiva/sclera: Conjunctivae normal    Neck:      Musculoskeletal: Neck supple  Thyroid: No thyromegaly  Cardiovascular:      Rate and Rhythm: Normal rate and regular rhythm  Heart sounds: Normal heart sounds  No murmur  Pulmonary:      Effort: Pulmonary effort is normal  No respiratory distress        Breath sounds: Normal breath sounds  Lymphadenopathy:      Cervical: No cervical adenopathy  Skin:     General: Skin is warm and dry  Psychiatric:         Behavior: Behavior normal          Operative site has been examined and clear of skin infection and inflammation  Assessment & Recommendations:     Patient is cleared for surgery as detailed above  Surgical Procedure risk category: low    Patient specific operative risk categegory: moderate

## 2021-01-28 NOTE — PATIENT INSTRUCTIONS
No glimepiride the day of or the day before surgery; No metformin the day of or the night before surgery   No Januvia  the day of surgery  Last day of Eliquis : Monday before surgery  Resume all regular meds the day after surgery  The other meds can be take as usual the morning of surgery with sip of water  Exercise both lower extremities after the surgery adhering to the nonweightbearing or weight-bearing restrictions of the  doctor

## 2021-01-29 ENCOUNTER — OFFICE VISIT (OUTPATIENT)
Dept: WOUND CARE | Facility: HOSPITAL | Age: 71
End: 2021-01-29
Payer: COMMERCIAL

## 2021-01-29 VITALS
RESPIRATION RATE: 18 BRPM | TEMPERATURE: 98.3 F | HEART RATE: 72 BPM | DIASTOLIC BLOOD PRESSURE: 88 MMHG | SYSTOLIC BLOOD PRESSURE: 152 MMHG

## 2021-01-29 DIAGNOSIS — T81.32XA DEEP DISRUPTION OR DEHISCENCE OF OPERATION WOUND, INITIAL ENCOUNTER: Primary | ICD-10-CM

## 2021-01-29 PROBLEM — T81.329A DEEP DISRUPTION OR DEHISCENCE OF OPERATION WOUND: Status: ACTIVE | Noted: 2021-01-15

## 2021-01-29 PROCEDURE — 29445 APPL RIGID TOT CNTC LEG CAST: CPT | Performed by: PODIATRIST

## 2021-01-29 NOTE — PROGRESS NOTES
Patient ID: Cee Guzman  is a 78 y o  male Date of Birth 1950       Chief Complaint   Patient presents with    Follow Up Wound Care Visit     Right foot wound       Surgical wound dehiscence    Subjective:   Memoreginacara Nikolai presents for wound care right foot  He is in a TCC  We are s/p the first stage surgical reconstruction procedure         The following portions of the patient's history were reviewed and updated as appropriate:   Patient Active Problem List   Diagnosis    Hyperlipidemia    Type 2 diabetes mellitus with diabetic neuropathy ([address] )    Atrial fibrillation with RVR ([address] )    Right middle lobe pulmonary nodule    Essential hypertension    Acquired absence of other right toe(s) ([address] )    Benign prostatic hyperplasia with lower urinary tract symptoms    Positive for microalbuminuria    Congenital talipes equinovarus, right foot    Deep disruption or dehiscence of operation wound     Past Medical History:   Diagnosis Date    Arthritis     Atrial fibrillation ([address] )     Diagnosed 11/2018    Benign prostate hyperplasia 04/2002    Cellulitis     right lower leg     Colon polyp 2006    Diabetes mellitus ([address] )     Hearing aid worn     bilat    Hearing loss     90% loss left ear and 40% right ear    History of clubfoot     "since birth"    History of pneumonia     History of TIA (transient ischemic attack) 04/25/2017 11/30/18 pt denies    Hyperlipidemia 5/15/2014    Hypertension     Infectious viral hepatitis     "cant remember type"    Irregular heart beat     Neuropathy     both feet    Osteomyelitis ([address] )     right great toe    Right middle lobe pulmonary nodule 11/6/2018    Seasickness     Teeth missing     Type 2 diabetes mellitus with diabetic neuropathy ([address] ) 11/6/2018    Wears glasses     reading    Wound, open     bottom of right foot     Past Surgical History:   Procedure Laterality Date    BUNIONECTOMY Right 12/4/2018    Procedure: 5TH METATARSAL BONE PARTIAL RESECTION, FULL THICKNESS DEBRIDEMENT OF DIABETIC ULCER;  Surgeon: Rosalinda Akins DPM;  [facility];  Service: Podiatry    CLUB FOOT RELEASE Bilateral     COLONOSCOPY      OR PART [address] BONE METATARSAL HEAD,EA Right 2020    Procedure: EXCISION EXOSTOSIS;  Surgeon: Sadiq Magaña DPM;  [facility];  Service: Gwyn Lank WND EXTEN/COMPLIC Right     Procedure: PRIMARY DELAYED CLOSURE;  Surgeon: Sadiq Magaña DPM;  [facility];  Service: Podiatry    TOE AMPUTATION Right     partial great toe    VASECTOMY       Social History     Socioeconomic History    Marital status: /Civil Union     Spouse name: None    Number of children: None    Years of education: None    Highest education level: None   Occupational History    None   Social Needs    Financial resource strain: None    Food insecurity     Worry: None     Inability: None    Transportation needs     Medical: None     Non-medical: None   Tobacco Use    Smoking status: Former Smoker     Types: Cigarettes     Quit date: 1988     Years since quittin 2    Smokeless tobacco: Former User   Substance and Sexual Activity    Alcohol use: Yes     Frequency: 2-4 times a month     Drinks per session: 1 or 2     Comment: socially    Drug use: No    Sexual activity: None   Lifestyle    Physical activity     Days per week: None     Minutes per session: None    Stress: None   Relationships    Social connections     Talks on phone: None     Gets together: None     Attends [employer] service: None     Active member of club or organization: None     Attends meetings of clubs or organizations: None     Relationship status: None    Intimate partner violence     Fear of current or ex partner: None     Emotionally abused: None     Physically abused: None     Forced sexual activity: None   Other Topics Concern    None   Social History Narrative    None        Current Outpatient Medications:    Acetaminophen (TYLENOL ARTHRITIS PAIN PO), Take 650 mg by mouth 2 (two) times a day, Disp: , Rfl:     atorvastatin (LIPITOR) 40 mg tablet, Take 1 tablet (40 mg total) by mouth daily, Disp: 90 tablet, Rfl: 3    Blood Glucose Monitoring Suppl (ACCU-CHEK JYOTI SMARTVIEW) w/Device KIT, use to check Blood Sugar as needed, Disp: , Rfl:     diltiazem (CARDIZEM) 120 MG tablet, Take 1 tablet (120 mg total) by mouth 2 (two) times a day, Disp: 180 tablet, Rfl: 6    Eliquis 5 MG, Take 1 tablet (5 mg total) by mouth 2 (two) times a day, Disp: 60 tablet, Rfl: 2    glimepiride (AMARYL) 4 mg tablet, Take one tablet by mouth twice daily, Disp: 180 tablet, Rfl: 3    glucose blood (ACCU-CHEK SMARTVIEW) test strip, Use as instructed BID, Disp: 200 each, Rfl: 12    Januvia 100 MG tablet, Take 1 tablet (100 mg total) by mouth daily, Disp: 132 tablet, Rfl: 3    lisinopril (ZESTRIL) 40 mg tablet, Take 1 tablet (40 mg total) by mouth daily, Disp: 90 tablet, Rfl: 3    metFORMIN (GLUCOPHAGE-XR) 750 mg 24 hr tablet, Take 1 tablet (750 mg total) by mouth 2 (two) times a day ONE BID, Disp: 180 tablet, Rfl: 3    oxyCODONE-acetaminophen (PERCOCET) 5-325 mg per tablet, Take 1 tablet by mouth every 4 (four) hours as needed for moderate pain for up to 10 dosesMax Daily Amount: 6 tablets, Disp: 10 tablet, Rfl: 0  Family History   Problem Relation Age of Onset    Diabetes Father         mellitus      Review of Systems   Constitutional: Negative for chills and fever  Respiratory: Negative for cough and shortness of breath  Cardiovascular: Negative for chest pain  Gastrointestinal: Negative for diarrhea, nausea and vomiting  Allergies  Simvastatin and Itraconazole    Objective:  /88   Pulse 72   Temp 98 3 °F (36 8 °C)   Resp 18     Physical Exam  Vitals signs and nursing note reviewed  Constitutional:       General: He is not in acute distress  Appearance: Normal appearance   He is not ill-appearing or toxic-appearing  Cardiovascular:      Rate and Rhythm: Normal rate and regular rhythm  Pulses: Normal pulses  Dorsalis pedis pulses are 2+ on the right side and 2+ on the left side  Posterior tibial pulses are 2+ on the right side and 2+ on the left side  Pulmonary:      Effort: Pulmonary effort is normal  No respiratory distress  Musculoskeletal:         General: No swelling, tenderness or signs of injury  Comments: Right 5th ray amputation  I note a "C" shaped foot deformity of the Right with the apex at the midfoot and significant cavovarus deformity with metadductus  Feet:      Right foot:      Protective Sensation: 10 sites tested  0 sites sensed  Left foot:      Protective Sensation: 10 sites tested  0 sites sensed  Skin:     Coloration: Skin is not cyanotic or mottled  Findings: No erythema  Rash is not purpuric  Nails: There is no clubbing  Comments: Incision is coapted  See Wound Assessment  No redness or cellulitis  No purulence  Neurological:      Mental Status: He is alert and oriented to person, place, and time  Cranial Nerves: No cranial nerve deficit  Sensory: Sensory deficit present  Motor: No weakness  Psychiatric:         Mood and Affect: Mood normal          Behavior: Behavior normal          Thought Content:  Thought content normal          Judgment: Judgment normal            Wound 06/04/20 Foot Right;Lateral (Active)   Wound Image   01/29/21 0844   Wound Description Epithelialization 01/29/21 0844   Lavern-wound Assessment Intact 01/29/21 0844   Wound Length (cm) 0 1 cm 01/29/21 0844   Wound Width (cm) 0 1 cm 01/29/21 0844   Wound Depth (cm) 0 cm 01/29/21 0844   Wound Surface Area (cm^2) 0 01 cm^2 01/29/21 0844   Wound Volume (cm^3) 0 cm^3 01/29/21 0844   Calculated Wound Volume (cm^3) 0 cm^3 01/29/21 0844   Change in Wound Size % 100 01/29/21 0844   Undermining 0 2 01/15/21 0901   Undermining is depth extending from 3-6 oclock 01/15/21 0901   Wound Site Closure Sutures 01/15/21 0901   Drainage Amount None 01/29/21 0844   Drainage Description Sanguineous; Tan 01/22/21 0927   Non-staged Wound Description Not applicable 44/58/47 7642   Treatments Cleansed 11/27/20 0835   Dressing Status Intact; Old drainage 12/18/20 0855           Wound 06/04/20 Foot Right;Lateral (Active)   Wound Image   01/29/21 0844   Wound Description Epithelialization 01/29/21 0844   Lavern-wound Assessment Intact 01/29/21 0844   Wound Length (cm) 0 1 cm 01/29/21 0844   Wound Width (cm) 0 1 cm 01/29/21 0844   Wound Depth (cm) 0 cm 01/29/21 0844   Wound Surface Area (cm^2) 0 01 cm^2 01/29/21 0844   Wound Volume (cm^3) 0 cm^3 01/29/21 0844   Calculated Wound Volume (cm^3) 0 cm^3 01/29/21 0844   Change in Wound Size % 100 01/29/21 0844   Undermining 0 2 01/15/21 0901   Undermining is depth extending from 3-6 oclock 01/15/21 0901   Wound Site Closure Sutures 01/15/21 0901   Drainage Amount None 01/29/21 0844   Drainage Description Sanguineous; Tan 01/22/21 0927   Non-staged Wound Description Not applicable 43/77/36 6911   Treatments Cleansed 11/27/20 0835   Dressing Status Intact; Old drainage 12/18/20 0855                         Diagnosis:  1  Deep disruption or dehiscence of operation wound, initial encounter  -     Wound cleansing and dressings; Future        Diagnosis ICD-10-CM Associated Orders   1  Deep disruption or dehiscence of operation wound, initial encounter  T81  32XA Wound cleansing and dressings        ASSESSMENT    Stable Chronic Problem Addressed:  1  Diabetic Peripheral neuropathy  2  Healed wound dehiscence      Chronic illness / Problem not at goal with exacerbation, progression or side effects of treatment  1  Cavovarus foot deformity      PLAN    Total contact cast applied    Cast Application    Date/Time: 1/29/2021 10:19 AM  Performed by: Robin Pastrana DPM  Authorized by: Robin Pastrana DPM   Universal Protocol:  Consent: Verbal consent obtained  Risks and benefits: risks, benefits and alternatives were discussed  Consent given by: patient  Time out: Immediately prior to procedure a "time out" was called to verify the correct patient, procedure, equipment, support staff and site/side marked as required  Timeout called at: 1/29/2021 10:19 AM   Patient understanding: patient states understanding of the procedure being performed  Patient identity confirmed: verbally with patient      Pre-procedure details:     Sensation:  Numbness  Procedure details:     Laterality:  Right    Location:  Leg    Leg:  R lower legCast type: Total contact    Supplies:  Fiberglass and cotton padding          Patient will undergo reconstructive (second stage) surgery of the foot on 2/12/21 with external fixation  He will maintain the cast clean, dry and intact until surgery

## 2021-01-29 NOTE — PATIENT INSTRUCTIONS
Orders Placed This Encounter   Procedures    Wound cleansing and dressings     Right foot wound: Foot cleansed with Soap and water, patted dry    Skin prep applied to michael wound, DSD applied, TCC to the RLE    Patient scheduled for Surgery on 2/12/21    Will follow up at the [facility] after surgery     Standing Status:   Future     Standing Expiration Date:   1/29/2022

## 2021-02-10 ENCOUNTER — ANESTHESIA EVENT (OUTPATIENT)
Dept: PERIOP | Facility: HOSPITAL | Age: 71
End: 2021-02-10
Payer: COMMERCIAL

## 2021-02-11 NOTE — PRE-PROCEDURE INSTRUCTIONS
Pre-Surgery Instructions:   Medication Instructions    Acetaminophen (TYLENOL ARTHRITIS PAIN PO) Instructed patient per Anesthesia Guidelines   atorvastatin (LIPITOR) 40 mg tablet Instructed patient per Anesthesia Guidelines   diltiazem (CARDIZEM) 120 MG tablet Instructed patient per Anesthesia Guidelines   Eliquis 5 MG Instructed patient per Anesthesia Guidelines   glimepiride (AMARYL) 4 mg tablet Instructed patient per Anesthesia Guidelines   Januvia 100 MG tablet Instructed patient per Anesthesia Guidelines   lisinopril (ZESTRIL) 40 mg tablet Instructed patient per Anesthesia Guidelines   metFORMIN (GLUCOPHAGE-XR) 750 mg 24 hr tablet Instructed patient per Anesthesia Guidelines   oxyCODONE-acetaminophen (PERCOCET) 5-325 mg per tablet Instructed patient per Anesthesia Guidelines  Instructed to take Diltiazem with sip of water the morning of surgery  Patient reports last dose of Eliquis was on 2/22 and PCP instructed him to hold his evening dose of Metformin 2/11 today as well as day of surgery

## 2021-02-12 ENCOUNTER — HOSPITAL ENCOUNTER (OUTPATIENT)
Dept: RADIOLOGY | Facility: HOSPITAL | Age: 71
Setting detail: OUTPATIENT SURGERY
Discharge: HOME | End: 2021-02-12
Payer: COMMERCIAL

## 2021-02-12 ENCOUNTER — ANESTHESIA (OUTPATIENT)
Dept: PERIOP | Facility: HOSPITAL | Age: 71
End: 2021-02-12
Payer: COMMERCIAL

## 2021-02-12 ENCOUNTER — APPOINTMENT (OUTPATIENT)
Dept: RADIOLOGY | Facility: HOSPITAL | Age: 71
End: 2021-02-12
Payer: COMMERCIAL

## 2021-02-12 ENCOUNTER — HOSPITAL ENCOUNTER (OUTPATIENT)
Facility: HOSPITAL | Age: 71
Setting detail: OUTPATIENT SURGERY
Discharge: HOME | End: 2021-02-13
Attending: PODIATRIST | Admitting: PODIATRIST
Payer: COMMERCIAL

## 2021-02-12 VITALS — HEART RATE: 109 BPM

## 2021-02-12 DIAGNOSIS — E78.5 HYPERLIPIDEMIA, UNSPECIFIED HYPERLIPIDEMIA TYPE: Primary | Chronic | ICD-10-CM

## 2021-02-12 DIAGNOSIS — Q66.01 CONGENITAL TALIPES EQUINOVARUS, RIGHT FOOT: ICD-10-CM

## 2021-02-12 DIAGNOSIS — Z98.890 POST-OPERATIVE STATE: ICD-10-CM

## 2021-02-12 DIAGNOSIS — E11.9 TYPE 2 DIABETES MELLITUS WITHOUT COMPLICATION, UNSPECIFIED WHETHER LONG TERM INSULIN USE (HCC): ICD-10-CM

## 2021-02-12 LAB
GLUCOSE SERPL-MCNC: 154 MG/DL (ref 65–140)
GLUCOSE SERPL-MCNC: 180 MG/DL (ref 65–140)
GLUCOSE SERPL-MCNC: 193 MG/DL (ref 65–140)

## 2021-02-12 PROCEDURE — 28735 FUSION OF FOOT BONES: CPT | Performed by: PODIATRIST

## 2021-02-12 PROCEDURE — C1713 ANCHOR/SCREW BN/BN,TIS/BN: HCPCS | Performed by: PODIATRIST

## 2021-02-12 PROCEDURE — 27606 INCISION OF ACHILLES TENDON: CPT | Performed by: PODIATRIST

## 2021-02-12 PROCEDURE — 99024 POSTOP FOLLOW-UP VISIT: CPT | Performed by: PODIATRIST

## 2021-02-12 PROCEDURE — 73610 X-RAY EXAM OF ANKLE: CPT

## 2021-02-12 PROCEDURE — 82948 REAGENT STRIP/BLOOD GLUCOSE: CPT

## 2021-02-12 PROCEDURE — 73620 X-RAY EXAM OF FOOT: CPT

## 2021-02-12 PROCEDURE — 76000 FLUOROSCOPY <1 HR PHYS/QHP: CPT | Performed by: PODIATRIST

## 2021-02-12 PROCEDURE — 73630 X-RAY EXAM OF FOOT: CPT

## 2021-02-12 DEVICE — BONE SCREW
Type: IMPLANTABLE DEVICE | Site: FOOT | Status: NON-FUNCTIONAL
Brand: VARIAX
Removed: 2021-02-22

## 2021-02-12 DEVICE — TRANSFIXING PIN
Type: IMPLANTABLE DEVICE | Site: FOOT | Status: NON-FUNCTIONAL
Brand: APEX
Removed: 2021-02-17

## 2021-02-12 DEVICE — END CAP
Type: IMPLANTABLE DEVICE | Site: FOOT | Status: NON-FUNCTIONAL
Brand: T2
Removed: 2021-02-17

## 2021-02-12 DEVICE — TELESCOPIC STRUT LONG LRF LENGTH: 177-277MM (RED)
Type: IMPLANTABLE DEVICE | Site: FOOT | Status: NON-FUNCTIONAL
Brand: HOFFMANN
Removed: 2021-02-17

## 2021-02-12 DEVICE — WIRE BOLT OFFSET ADAPTER - LONG LRF
Type: IMPLANTABLE DEVICE | Site: FOOT | Status: NON-FUNCTIONAL
Brand: HOFFMANN
Removed: 2021-02-17

## 2021-02-12 DEVICE — FOOT ARCH LRF
Type: IMPLANTABLE DEVICE | Site: FOOT | Status: NON-FUNCTIONAL
Brand: HOFFMANN
Removed: 2021-02-17

## 2021-02-12 DEVICE — FOOT RING LONG LRF
Type: IMPLANTABLE DEVICE | Site: FOOT | Status: NON-FUNCTIONAL
Brand: HOFFMANN
Removed: 2021-02-17

## 2021-02-12 DEVICE — HINGE COUPLING LRF
Type: IMPLANTABLE DEVICE | Site: FOOT | Status: NON-FUNCTIONAL
Brand: HOFFMANN
Removed: 2021-02-17

## 2021-02-12 DEVICE — TELESCOPIC STRUT MEDIUM LRF LENGTH: 138-201MM (BLUE)
Type: IMPLANTABLE DEVICE | Site: FOOT | Status: NON-FUNCTIONAL
Brand: HOFFMANN
Removed: 2021-02-17

## 2021-02-12 DEVICE — WIRE BOLT - MEDIUM LRF FOR WIRE DIA1.5 TO 2.0MM
Type: IMPLANTABLE DEVICE | Site: FOOT | Status: NON-FUNCTIONAL
Brand: HOFFMANN
Removed: 2021-02-17

## 2021-02-12 DEVICE — CONNECTING NUT - SHORT LRF M8 X 6MM
Type: IMPLANTABLE DEVICE | Status: NON-FUNCTIONAL
Brand: HOFFMANN
Removed: 2021-02-17

## 2021-02-12 DEVICE — KIRSCHNER WIRE
Type: IMPLANTABLE DEVICE | Site: FOOT | Status: NON-FUNCTIONAL
Removed: 2021-02-17

## 2021-02-12 DEVICE — FULL RING LRF
Type: IMPLANTABLE DEVICE | Site: FOOT | Status: NON-FUNCTIONAL
Brand: HOFFMANN
Removed: 2021-02-17

## 2021-02-12 DEVICE — END CAP
Type: IMPLANTABLE DEVICE | Site: FOOT | Status: NON-FUNCTIONAL
Brand: T2
Removed: 2021-02-22

## 2021-02-12 DEVICE — WIRE BOLT - LONG LRF FOR WIRE DIA1.5 TO 2.0MM
Type: IMPLANTABLE DEVICE | Site: FOOT | Status: NON-FUNCTIONAL
Brand: HOFFMANN
Removed: 2021-02-17

## 2021-02-12 DEVICE — NAIL WITH COMPRESSION SCREW
Type: IMPLANTABLE DEVICE | Site: FOOT | Status: NON-FUNCTIONAL
Brand: T2
Removed: 2021-02-22

## 2021-02-12 DEVICE — THREADED ROD LRF
Type: IMPLANTABLE DEVICE | Site: FOOT | Status: NON-FUNCTIONAL
Brand: HOFFMANN
Removed: 2021-02-17

## 2021-02-12 RX ORDER — PROMETHAZINE HYDROCHLORIDE 25 MG/ML
6.25 INJECTION, SOLUTION INTRAMUSCULAR; INTRAVENOUS ONCE AS NEEDED
Status: DISCONTINUED | OUTPATIENT
Start: 2021-02-12 | End: 2021-02-12 | Stop reason: HOSPADM

## 2021-02-12 RX ORDER — EPHEDRINE SULFATE 50 MG/ML
INJECTION INTRAVENOUS AS NEEDED
Status: DISCONTINUED | OUTPATIENT
Start: 2021-02-12 | End: 2021-02-12

## 2021-02-12 RX ORDER — LIDOCAINE HYDROCHLORIDE 20 MG/ML
INJECTION, SOLUTION EPIDURAL; INFILTRATION; INTRACAUDAL; PERINEURAL AS NEEDED
Status: DISCONTINUED | OUTPATIENT
Start: 2021-02-12 | End: 2021-02-12

## 2021-02-12 RX ORDER — SUCCINYLCHOLINE/SOD CL,ISO/PF 100 MG/5ML
SYRINGE (ML) INTRAVENOUS AS NEEDED
Status: DISCONTINUED | OUTPATIENT
Start: 2021-02-12 | End: 2021-02-12

## 2021-02-12 RX ORDER — SODIUM CHLORIDE 9 MG/ML
125 INJECTION, SOLUTION INTRAVENOUS CONTINUOUS
Status: DISCONTINUED | OUTPATIENT
Start: 2021-02-12 | End: 2021-02-13 | Stop reason: HOSPADM

## 2021-02-12 RX ORDER — MIDAZOLAM HYDROCHLORIDE 2 MG/2ML
INJECTION, SOLUTION INTRAMUSCULAR; INTRAVENOUS AS NEEDED
Status: DISCONTINUED | OUTPATIENT
Start: 2021-02-12 | End: 2021-02-12

## 2021-02-12 RX ORDER — MEPERIDINE HYDROCHLORIDE 25 MG/ML
12.5 INJECTION INTRAMUSCULAR; INTRAVENOUS; SUBCUTANEOUS ONCE AS NEEDED
Status: DISCONTINUED | OUTPATIENT
Start: 2021-02-12 | End: 2021-02-12 | Stop reason: HOSPADM

## 2021-02-12 RX ORDER — SODIUM CHLORIDE 9 MG/ML
INJECTION, SOLUTION INTRAVENOUS CONTINUOUS PRN
Status: DISCONTINUED | OUTPATIENT
Start: 2021-02-12 | End: 2021-02-12

## 2021-02-12 RX ORDER — ROPIVACAINE HYDROCHLORIDE 5 MG/ML
INJECTION, SOLUTION EPIDURAL; INFILTRATION; PERINEURAL AS NEEDED
Status: DISCONTINUED | OUTPATIENT
Start: 2021-02-12 | End: 2021-02-12

## 2021-02-12 RX ORDER — OXYCODONE HCL 10 MG/1
10 TABLET, FILM COATED, EXTENDED RELEASE ORAL EVERY 12 HOURS SCHEDULED
Status: DISCONTINUED | OUTPATIENT
Start: 2021-02-12 | End: 2021-02-13 | Stop reason: HOSPADM

## 2021-02-12 RX ORDER — MAGNESIUM HYDROXIDE 1200 MG/15ML
LIQUID ORAL AS NEEDED
Status: DISCONTINUED | OUTPATIENT
Start: 2021-02-12 | End: 2021-02-12 | Stop reason: HOSPADM

## 2021-02-12 RX ORDER — HYDROMORPHONE HCL/PF 1 MG/ML
0.5 SYRINGE (ML) INJECTION EVERY 6 HOURS PRN
Status: DISCONTINUED | OUTPATIENT
Start: 2021-02-12 | End: 2021-02-13 | Stop reason: HOSPADM

## 2021-02-12 RX ORDER — FENTANYL CITRATE/PF 50 MCG/ML
50 SYRINGE (ML) INJECTION
Status: DISCONTINUED | OUTPATIENT
Start: 2021-02-12 | End: 2021-02-12 | Stop reason: HOSPADM

## 2021-02-12 RX ORDER — FENTANYL CITRATE 50 UG/ML
INJECTION, SOLUTION INTRAMUSCULAR; INTRAVENOUS AS NEEDED
Status: DISCONTINUED | OUTPATIENT
Start: 2021-02-12 | End: 2021-02-12

## 2021-02-12 RX ORDER — OXYCODONE HYDROCHLORIDE 5 MG/1
5 TABLET ORAL EVERY 4 HOURS PRN
Status: DISCONTINUED | OUTPATIENT
Start: 2021-02-12 | End: 2021-02-13 | Stop reason: HOSPADM

## 2021-02-12 RX ORDER — ACETAMINOPHEN 325 MG/1
650 TABLET ORAL EVERY 6 HOURS PRN
Status: DISCONTINUED | OUTPATIENT
Start: 2021-02-12 | End: 2021-02-13 | Stop reason: HOSPADM

## 2021-02-12 RX ORDER — ONDANSETRON 2 MG/ML
4 INJECTION INTRAMUSCULAR; INTRAVENOUS ONCE AS NEEDED
Status: DISCONTINUED | OUTPATIENT
Start: 2021-02-12 | End: 2021-02-12 | Stop reason: HOSPADM

## 2021-02-12 RX ORDER — METFORMIN HYDROCHLORIDE 750 MG/1
750 TABLET, EXTENDED RELEASE ORAL 2 TIMES DAILY
Status: DISCONTINUED | OUTPATIENT
Start: 2021-02-12 | End: 2021-02-13 | Stop reason: HOSPADM

## 2021-02-12 RX ORDER — OXYCODONE HYDROCHLORIDE AND ACETAMINOPHEN 5; 325 MG/1; MG/1
1 TABLET ORAL EVERY 4 HOURS PRN
Status: DISCONTINUED | OUTPATIENT
Start: 2021-02-12 | End: 2021-02-13 | Stop reason: HOSPADM

## 2021-02-12 RX ORDER — HYDROMORPHONE HCL/PF 1 MG/ML
0.5 SYRINGE (ML) INJECTION
Status: DISCONTINUED | OUTPATIENT
Start: 2021-02-12 | End: 2021-02-12 | Stop reason: HOSPADM

## 2021-02-12 RX ORDER — LISINOPRIL 20 MG/1
40 TABLET ORAL DAILY
Status: DISCONTINUED | OUTPATIENT
Start: 2021-02-13 | End: 2021-02-13 | Stop reason: HOSPADM

## 2021-02-12 RX ORDER — CEFAZOLIN SODIUM 2 G/50ML
2000 SOLUTION INTRAVENOUS ONCE
Status: COMPLETED | OUTPATIENT
Start: 2021-02-12 | End: 2021-02-12

## 2021-02-12 RX ORDER — ATORVASTATIN CALCIUM 40 MG/1
40 TABLET, FILM COATED ORAL
Status: DISCONTINUED | OUTPATIENT
Start: 2021-02-13 | End: 2021-02-13 | Stop reason: HOSPADM

## 2021-02-12 RX ORDER — PROPOFOL 10 MG/ML
INJECTION, EMULSION INTRAVENOUS AS NEEDED
Status: DISCONTINUED | OUTPATIENT
Start: 2021-02-12 | End: 2021-02-12

## 2021-02-12 RX ORDER — HYDROMORPHONE HCL/PF 1 MG/ML
SYRINGE (ML) INJECTION AS NEEDED
Status: DISCONTINUED | OUTPATIENT
Start: 2021-02-12 | End: 2021-02-12

## 2021-02-12 RX ADMIN — DILTIAZEM HYDROCHLORIDE 120 MG: 30 TABLET, FILM COATED ORAL at 18:52

## 2021-02-12 RX ADMIN — Medication 100 MG: at 14:25

## 2021-02-12 RX ADMIN — EPHEDRINE SULFATE 10 MG: 50 INJECTION, SOLUTION INTRAVENOUS at 13:55

## 2021-02-12 RX ADMIN — SODIUM CHLORIDE 125 ML/HR: 0.9 INJECTION, SOLUTION INTRAVENOUS at 11:29

## 2021-02-12 RX ADMIN — PHENYLEPHRINE HYDROCHLORIDE 200 MCG: 10 INJECTION INTRAVENOUS at 13:59

## 2021-02-12 RX ADMIN — PHENYLEPHRINE HYDROCHLORIDE 100 MCG: 10 INJECTION INTRAVENOUS at 13:51

## 2021-02-12 RX ADMIN — PROPOFOL 100 MG: 10 INJECTION, EMULSION INTRAVENOUS at 14:25

## 2021-02-12 RX ADMIN — PHENYLEPHRINE HYDROCHLORIDE 100 MCG: 10 INJECTION INTRAVENOUS at 16:35

## 2021-02-12 RX ADMIN — PHENYLEPHRINE HYDROCHLORIDE 100 MCG: 10 INJECTION INTRAVENOUS at 13:55

## 2021-02-12 RX ADMIN — METFORMIN ER 750 MG 750 MG: 750 TABLET ORAL at 21:50

## 2021-02-12 RX ADMIN — OXYCODONE HYDROCHLORIDE 5 MG: 5 TABLET ORAL at 21:29

## 2021-02-12 RX ADMIN — CEFAZOLIN SODIUM 2000 MG: 2 SOLUTION INTRAVENOUS at 13:21

## 2021-02-12 RX ADMIN — APIXABAN 5 MG: 5 TABLET, FILM COATED ORAL at 18:52

## 2021-02-12 RX ADMIN — SODIUM CHLORIDE: 0.9 INJECTION, SOLUTION INTRAVENOUS at 17:13

## 2021-02-12 RX ADMIN — PROPOFOL 250 MG: 10 INJECTION, EMULSION INTRAVENOUS at 13:45

## 2021-02-12 RX ADMIN — PROPOFOL 50 MG: 10 INJECTION, EMULSION INTRAVENOUS at 14:18

## 2021-02-12 RX ADMIN — SODIUM CHLORIDE: 0.9 INJECTION, SOLUTION INTRAVENOUS at 14:44

## 2021-02-12 RX ADMIN — ROPIVACAINE HYDROCHLORIDE 30 ML: 5 INJECTION, SOLUTION EPIDURAL; INFILTRATION; PERINEURAL at 13:15

## 2021-02-12 RX ADMIN — HYDROMORPHONE HYDROCHLORIDE 0.5 MG: 1 INJECTION, SOLUTION INTRAMUSCULAR; INTRAVENOUS; SUBCUTANEOUS at 15:34

## 2021-02-12 RX ADMIN — CEFAZOLIN SODIUM 2000 MG: 2 SOLUTION INTRAVENOUS at 17:12

## 2021-02-12 RX ADMIN — EPHEDRINE SULFATE 10 MG: 50 INJECTION, SOLUTION INTRAVENOUS at 13:59

## 2021-02-12 RX ADMIN — LIDOCAINE HYDROCHLORIDE 100 MG: 20 INJECTION, SOLUTION EPIDURAL; INFILTRATION; INTRACAUDAL; PERINEURAL at 13:45

## 2021-02-12 RX ADMIN — MIDAZOLAM 4 MG: 1 INJECTION INTRAMUSCULAR; INTRAVENOUS at 13:10

## 2021-02-12 RX ADMIN — ROPIVACAINE HYDROCHLORIDE 10 ML: 5 INJECTION, SOLUTION EPIDURAL; INFILTRATION; PERINEURAL at 13:20

## 2021-02-12 RX ADMIN — SODIUM CHLORIDE: 0.9 INJECTION, SOLUTION INTRAVENOUS at 14:04

## 2021-02-12 RX ADMIN — HYDROMORPHONE HYDROCHLORIDE 0.5 MG: 1 INJECTION, SOLUTION INTRAMUSCULAR; INTRAVENOUS; SUBCUTANEOUS at 16:30

## 2021-02-12 RX ADMIN — INSULIN LISPRO 1 UNITS: 100 INJECTION, SOLUTION INTRAVENOUS; SUBCUTANEOUS at 21:49

## 2021-02-12 RX ADMIN — FENTANYL CITRATE 100 MCG: 50 INJECTION, SOLUTION INTRAMUSCULAR; INTRAVENOUS at 13:10

## 2021-02-12 NOTE — ANESTHESIA PROCEDURE NOTES
Peripheral Block    Patient location during procedure: holding area  Start time: 2/12/2021 1:10 PM  Reason for block: at surgeon's request and post-op pain management  Staffing  Anesthesiologist: Dev Cortez MD  Performed: anesthesiologist   Preanesthetic Checklist  Completed: patient identified, site marked, surgical consent, pre-op evaluation, timeout performed, IV checked, risks and benefits discussed and monitors and equipment checked  Peripheral Block  Patient position: supine  Prep: ChloraPrep  Patient monitoring: frequent blood pressure checks and continuous pulse ox  Block type: adductor canal block  Laterality: right  Injection technique: single-shot  Procedures: ultrasound guided, Ultrasound guidance required for the procedure to increase accuracy and safety of medication placement and decrease risk of complications    Ultrasound permanent image saved  Needle  Needle type: Stimuplex   Needle gauge: 21 G  Needle length: 5 cm  Needle localization: ultrasound guidance  Test dose: negative  Assessment  Injection assessment: incremental injection, local visualized surrounding nerve on ultrasound, negative aspiration for CSF, negative aspiration for heme and no paresthesia on injection  Paresthesia pain: none  Heart rate change: no  Slow fractionated injection: yes  Post-procedure:  site cleaned  patient tolerated the procedure well with no immediate complications

## 2021-02-12 NOTE — H&P
H&P Exam - Ashley Rodriguez  78 y o  male MRN: [id number]    Unit/Bed#: [facility] [place] Encounter: [id number]    Assessment:  78 Male s/p right midfoot arthrodesis, CHRISTIE and application of multiplanar external fixation (2/12/21)  Plan:  -patient is admitted under Dr Kelley Tom service for 23 hours observation  -nonweightbearing to the right lower extremity  -p r n  Pain control  -continue diet  -continue home medication, insulin sliding-scale in place  -PT OT consult pending  -DC tomorrow once cleared by PT OT    History of Present Illness   51-year-old male status post right midfoot arthrodesis and application of multiplanar external fixation is being admitted for 23 hours observation  Postoperatively patient denies any acute complaints  Review of Systems   Constitutional: Negative  HENT: Negative  Respiratory: Negative for choking and shortness of breath  Cardiovascular: Negative  Gastrointestinal: Negative for nausea and vomiting  Musculoskeletal: Negative  Negative for gait problem  Neurological: Negative  Psychiatric/Behavioral: Negative for agitation         Historical Information   Past Medical History:   Diagnosis Date    Arthritis     Atrial fibrillation ([address] )     Diagnosed 11/2018    Benign prostate hyperplasia 04/2002    Cellulitis     right lower leg     Colon polyp 2006    Diabetes mellitus ([address] )     Hearing aid worn     bilat    Hearing loss     90% loss left ear and 40% right ear    History of clubfoot     "since birth"    History of pneumonia     History of TIA (transient ischemic attack) 04/25/2017 11/30/18 pt denies    Hyperlipidemia 5/15/2014    Hypertension     Infectious viral hepatitis     "cant remember type"    Irregular heart beat     Neuropathy     both feet    Osteomyelitis ([address] )     right great toe    Right middle lobe pulmonary nodule 11/6/2018    Seasickness     Teeth missing     Type 2 diabetes mellitus with diabetic neuropathy ([address] ) 11/6/2018    Wears glasses     reading    Wound, open     bottom of right foot     Past Surgical History:   Procedure Laterality Date    BUNIONECTOMY Right 2018    Procedure: 5TH METATARSAL BONE PARTIAL RESECTION, FULL THICKNESS DEBRIDEMENT OF DIABETIC ULCER;  Surgeon: Heather Melgoza DPM;  Location: [facility];  Service: Podiatry    CLUB FOOT RELEASE Bilateral     COLONOSCOPY      IN PART [address] BONE METATARSAL HEAD,EA Right 2020    Procedure: EXCISION EXOSTOSIS;  Surgeon: Ponce Peraza DPM;  Location[facility];  Service: Sandra Dejesus WND EXTEN/COMPLIC Right     Procedure: PRIMARY DELAYED CLOSURE;  Surgeon: Ponce Peraza DPM;  Location[facility];  Service: Podiatry    TOE AMPUTATION Right     partial great toe    VASECTOMY       Social History   Social History     Substance and Sexual Activity   Alcohol Use Yes    Frequency: 2-4 times a month    Drinks per session: 1 or 2    Comment: socially     Social History     Substance and Sexual Activity   Drug Use No     Social History     Tobacco Use   Smoking Status Former Smoker    Types: Cigarettes    Quit date: 1988    Years since quittin 2   Smokeless Tobacco Former User     E-Cigarette Use: Never User     E-Cigarette/Vaping Substances    Nicotine No     THC No     CBD No        Family History: non-contributory    Meds/Allergies   all medications and allergies reviewed  Allergies   Allergen Reactions    Simvastatin Myalgia    Itraconazole Other (See Comments)     Pt denies knowledge of allergy           Objective   First Vitals:   Blood Pressure: 134/77 (21 1118)  Pulse: 91 (21 1118)  Temperature: 98 4 °F (36 9 °C) (21 1118)  Temp Source: Tympanic (21 1118)  Respirations: 18 (21 1118)  Height: 6' 4" (193 cm) (21 0836)  Weight - Scale: 109 kg (241 lb) (21 0836)  SpO2: 98 % (21 1118)    Current Vitals:   Blood Pressure: 141/99 (21 1812)  Pulse: 96 (21 1812)  Temperature: 97 9 °F (36 6 °C) (02/12/21 1812)  Temp Source: Tympanic (02/12/21 1118)  Respirations: 18 (02/12/21 1812)  Height: 6' 4" (193 cm) (02/12/21 1118)  Weight - Scale: 109 kg (241 lb) (02/12/21 1118)  SpO2: 95 % (02/12/21 1812)      Intake/Output Summary (Last 24 hours) at 2/12/2021 1821  Last data filed at 2/12/2021 1815  Gross per 24 hour   Intake 2700 ml   Output 660 ml   Net 2040 ml       Invasive Devices     Peripheral Intravenous Line            Peripheral IV 02/12/21 Left Wrist less than 1 day                Physical Exam  Musculoskeletal:      Right foot: Normal range of motion  Feet:      Right foot:      Protective Sensation: 10 sites tested  1 site sensed  Left foot:      Protective Sensation: 10 sites tested  3 sites sensed  Comments: Right lower extremity with dressings intact and external fixation intact

## 2021-02-12 NOTE — ANESTHESIA PREPROCEDURE EVALUATION
Procedure:  foot ARTHRODESIS/FUSION (Right Foot)  LENGTHEN TIBIAL TENDON, TRANS HEEL CORD (Right Ankle)    Relevant Problems   CARDIO   (+) Atrial fibrillation with RVR (HCC)   (+) Essential hypertension   (+) Hyperlipidemia      ENDO   (+) Type 2 diabetes mellitus with diabetic neuropathy (HCC)      /RENAL   (+) Benign prostatic hyperplasia with lower urinary tract symptoms      NEURO/PSYCH   (+) Type 2 diabetes mellitus with diabetic neuropathy (HCC)        Physical Exam    Airway    Mallampati score: II  TM Distance: >3 FB  Neck ROM: full     Dental   No notable dental hx     Cardiovascular  Rhythm: irregular, Rate: normal, Cardiovascular exam normal    Pulmonary  Pulmonary exam normal Breath sounds clear to auscultation,     Other Findings        Anesthesia Plan  ASA Score- 3     Anesthesia Type- general and regional with ASA Monitors  Additional Monitors:   Airway Plan: ETT and LMA  Comment: Popliteal/adductor  Plan Factors-    Chart reviewed  Patient summary reviewed  Induction- intravenous  Postoperative Plan-     Informed Consent- Anesthetic plan and risks discussed with patient

## 2021-02-12 NOTE — ANESTHESIA PROCEDURE NOTES
Peripheral Block    Patient location during procedure: holding area  Start time: 2/12/2021 1:00 PM  Reason for block: at surgeon's request and post-op pain management  Staffing  Anesthesiologist: Rakan Tolliver MD  Performed: anesthesiologist   Preanesthetic Checklist  Completed: patient identified, site marked, surgical consent, pre-op evaluation, timeout performed, IV checked, risks and benefits discussed and monitors and equipment checked  Peripheral Block  Patient position: left lateral decubitus  Prep: ChloraPrep  Patient monitoring: continuous pulse ox and frequent blood pressure checks  Block type: popliteal  Laterality: right  Injection technique: single-shot  Procedures: ultrasound guided, Ultrasound guidance required for the procedure to increase accuracy and safety of medication placement and decrease risk of complications   and nerve stimulator  Ultrasound permanent image saved  Needle  Needle type: Stimuplex   Needle gauge: 21 G  Needle length: 5 cm  Needle localization: ultrasound guidance and nerve stimulator  Test dose: negative  Assessment  Injection assessment: incremental injection, local visualized surrounding nerve on ultrasound, negative aspiration for CSF, negative aspiration for heme and no paresthesia on injection  Paresthesia pain: none  Heart rate change: no  Slow fractionated injection: yes  Post-procedure:  site cleaned  patient tolerated the procedure well with no immediate complications

## 2021-02-12 NOTE — INTERVAL H&P NOTE
H&P reviewed  After examining the patient I find no changes in the patients condition since the H&P had been written      Vitals:    02/12/21 1332   BP: 110/62   Pulse: 81   Resp: 18   Temp:    SpO2: 99%

## 2021-02-12 NOTE — ANESTHESIA POSTPROCEDURE EVALUATION
Post-Op Assessment Note    CV Status:  Stable  Pain Score: 2    Pain management: adequate     Mental Status:  Alert and awake   Hydration Status:  Euvolemic   PONV Controlled:  Controlled   Airway Patency:  Patent      Post Op Vitals Reviewed: Yes      Staff: Anesthesiologist         No complications documented      /86 (02/12/21 1757)    Temp      Pulse 86 (02/12/21 1757)   Resp 19 (02/12/21 1757)    SpO2 99 % (02/12/21 1757)

## 2021-02-12 NOTE — OP NOTE
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]    Procedure(s) (LRB):  foot ARTHRODESIS/FUSION (Right)  LENGTHEN TIBIAL TENDON, TRANS HEEL CORD (Right)  Application multiplane external fixation    Specimen(s):  * No specimens in log *    Estimated Blood Loss:   10 mL    Drains:  [REMOVED] Urethral Catheter Double-lumen; Latex 16 Fr  (Removed)   Number of days: 0       Anesthesia Type:   General w/ Popliteal Block     Hemostasis:  Right pneumatic thigh tourniquet at 300 mmHg for 130 minutes    Materials:  Implant Name Type Inv   Item Serial No   Lot No  LRB No  Used Action   RING FULL 180MM - [id number]  RING FULL 180MM  [employer]  Right 1 Implanted   RING FOOT 155MM LONG - [id number]  RING FOOT 155MM LONG  [employer]  Right 1 Implanted   FOOT ARCH 155MM CARBON - [id number]  FOOT ARCH 155MM CARBON  [employer]  Right 1 Implanted   HINGE COUPLING - [id number]  HINGE COUPLING  [employer]  Right 2 Implanted   NAIL IM T2 ICF 8 X 120MM - [id number]  NAIL IM T2 ICF 8 X 120MM  [employer] [id number] Right 1 Implanted   END CAP STANDARD - [id number]  END CAP STANDARD  [employer] [id number] Right 1 Implanted   KWIRE 2 5 X 150MM - [id number]  KWIRE 2 5 X 150MM  [employer] [id number] Right 2 Implanted   SCREW 3 5 X 44MM T10 FLLY TRHD - [id number]  SCREW 3 5 X 44MM T10 FLLY TRHD  [employer]  Right 1 Implanted   SCREW 3 5 X 18MM T10 FLLY TRHD - [id number]  SCREW 3 5 X 18MM T10 FLLY TRHD  [employer]  Right 1 Implanted   SCREW 3 5 X 20MM T10 FLLY TRHD - [id number]  SCREW 3 5 X 20MM T10 FLLY TRHD  [employer]  Right 1 Implanted   PIN APE X TRANSFI X 4 X 300 5 X 50THR - [id number]  PIN APE X TRANSFI X 4 X 300 5 X 50THR  [employer]  Right 1 Implanted   WIRE BOLT MEDIUM 1 5-2  0MM - [id number]  WIRE BOLT MEDIUM 1 5-2 0MM  [employer]  Right 6 Implanted   WIRE BOLT LONG 1 5-2  0MM - [id number]  WIRE BOLT LONG 1 5-2 0MM  [employer]  Right 1 Implanted   WIRE BOLT OFFSET ADAPTER LONG - [id number]  WIRE BOLT OFFSET ADAPTER LONG  [employer]  Right 1 Implanted   STRUT TELESCOPIC -201MM BLUE - [id number]  STRUT TELESCOPIC -201MM BLUE  [employer]  Right 3 Implanted   STRUT TELESCOPIC LONG 177-277MM RED - [id number]  STRUT TELESCOPIC LONG 177-277MM RED  [employer]  Right 1 Implanted   K-WIRE 2MM X 450MM - [id number]  K-WIRE 2MM X 450MM  [employer]  Right 3 Implanted     2-0 Vicryl, 3-0 Vicryl, 3-0 nylon, 4-0 nylon    Operative Findings:  Consistent with the diagnosis  Complications:   None    Procedure and Technique:     Under mild sedation, the patient was brought into the operating room and placed on the operating room table in the supine position  IV sedation was achieved by anesthesia team and a universal timeout was performed where all parties are in agreement of correct patient, correct procedure and correct site  A pneumatic tourniquet was then placed over the patient's right lower extremity with ample padding  The foot was then prepped and draped in the usual aseptic manner  An esmarch bandage was used to exsangunate the foot and the pneumatic tourniquet was then inflated to 300mmHg  Attention was then directed to the posterior aspect of the heel  A triple elian-section with 2 medial and one lateral incisions w were performed utilizing a #15 blade  The Achilles tendon was effectively lengthened in a controlled manner  Incisions were closed with 4-0 nylon in a simple suture technique  Attention was then directed to the lateral aspect of the right foot where approximately 10-12 cm linear incision was made    Incision was carried down to the level of subcutaneous tissue using sharp and blunt dissection  Care was taken to identify and retract all the vital neurovascular structures  Incision was then carried down to the level of bone  The periosteal and capsular structures were reflected off of its osseous attachments dorsally and plantarly from lateral to medial utilizing a Mayorga elevator  Once the appropriate soft tissue was detached from the osseous attachment at the level of midfoot, 2 K-wires were driven from lateral to medial in an opening base wedge osteotomy with apex pointing medially  The position of the K-wires were confirmed utilizing a C-arm fluoroscopy  Next a sagittal saw was utilized to take the wedge out that was determined using the 2 K-wires, this was done under live fluoroscopy  Utilizing a rongeur the wedge was taken out from lateral aspect  Attention was then directed to medially at the level of 1st tarsometatarsal joint, where the remaining wedge was taken  Next a stab incision was made at the level of 1st MTPJ application of Codasystem T2 ICF Nail  The T2 ICF nail was inserted from 1st metatarsal to talar body utilizing the manufacture's protocol, this was done under fluoroscopy  This point foot appeared to be in a corrected position  All the incision sites were then rinsed with copious amount of normal sterile saline  The incision sites were then closed in a layered fashion using a 2-0 Vicryl, 3-0 Vicryl, 3-0 nylon  Next a static external fixation was applied on the right lower extremity  A transpin was inserted from medial to lateral calcaneus  The trans pin was secured to the foot ring appropriately with using bolts  Next a forefoot smooth K-wire was placed from medial to lateral and was secured to the footplate  The forefoot K-wire was then tensioned appropriately to compress the lateral column  Next tibial ring was fixated on the lower leg utilizing a 2 smooth K-wires    These K-wires were then tensioned appropriately as well  Four Dynamicstruts ware fixated from footplate to tibial ring and was locked once the foot was reduced in appropriate position  At this time foot appeared to be well corrected  The foot and leg was rinsed with copious amount of normal sterile saline  All incisions were then dressed with Xeroform, 4 x 4 gauze Kacy and an Ace wrap  The tourniquet was deflated at approximately 130 min and normal hyperemic response was noted to all digits  The patient tolerated the procedure and anesthesia well without immediate complications and transferred to PACU with vital signs stable  Dr Leilani Shaikh was present during the entire procedure and participated in all hall aspects  Claire Kearns DPM  DATE: February 12, 2021  TIME: 5:49 PM      Portions of the record may have been created with voice recognition software  Occasional wrong word or "sound a like" substitutions may have occurred due to the inherent limitations of voice recognition software  Read the chart carefully and recognize, using context, where substitutions have occurred

## 2021-02-13 VITALS
SYSTOLIC BLOOD PRESSURE: 160 MMHG | BODY MASS INDEX: 29.35 KG/M2 | TEMPERATURE: 98 F | OXYGEN SATURATION: 99 % | HEIGHT: 76 IN | DIASTOLIC BLOOD PRESSURE: 94 MMHG | WEIGHT: 241 LBS | RESPIRATION RATE: 18 BRPM | HEART RATE: 112 BPM

## 2021-02-13 PROBLEM — Z98.890 POST-OPERATIVE STATE: Status: ACTIVE | Noted: 2021-02-13

## 2021-02-13 LAB
GLUCOSE SERPL-MCNC: 184 MG/DL (ref 65–140)
GLUCOSE SERPL-MCNC: 202 MG/DL (ref 65–140)
GLUCOSE SERPL-MCNC: 225 MG/DL (ref 65–140)

## 2021-02-13 PROCEDURE — 82948 REAGENT STRIP/BLOOD GLUCOSE: CPT

## 2021-02-13 PROCEDURE — NC001 PR NO CHARGE: Performed by: PODIATRIST

## 2021-02-13 RX ORDER — OXYCODONE HYDROCHLORIDE AND ACETAMINOPHEN 5; 325 MG/1; MG/1
1 TABLET ORAL EVERY 6 HOURS PRN
Qty: 20 TABLET | Refills: 0 | Status: SHIPPED | OUTPATIENT
Start: 2021-02-13 | End: 2021-03-18 | Stop reason: ALTCHOICE

## 2021-02-13 RX ORDER — OXYCODONE HYDROCHLORIDE AND ACETAMINOPHEN 5; 325 MG/1; MG/1
1 TABLET ORAL EVERY 4 HOURS PRN
Qty: 11 TABLET | Refills: 0 | Status: SHIPPED | OUTPATIENT
Start: 2021-02-13 | End: 2021-02-13 | Stop reason: SDUPTHER

## 2021-02-13 RX ORDER — BLOOD SUGAR DIAGNOSTIC
STRIP MISCELLANEOUS
Qty: 200 EACH | Refills: 1 | Status: SHIPPED | OUTPATIENT
Start: 2021-02-13 | End: 2021-06-23 | Stop reason: SDUPTHER

## 2021-02-13 RX ADMIN — OXYCODONE HYDROCHLORIDE 5 MG: 5 TABLET ORAL at 02:25

## 2021-02-13 RX ADMIN — ACETAMINOPHEN 650 MG: 325 TABLET ORAL at 00:52

## 2021-02-13 RX ADMIN — SODIUM CHLORIDE 125 ML/HR: 0.9 INJECTION, SOLUTION INTRAVENOUS at 00:54

## 2021-02-13 RX ADMIN — INSULIN LISPRO 2 UNITS: 100 INJECTION, SOLUTION INTRAVENOUS; SUBCUTANEOUS at 12:45

## 2021-02-13 RX ADMIN — OXYCODONE HYDROCHLORIDE 5 MG: 5 TABLET ORAL at 12:50

## 2021-02-13 RX ADMIN — OXYCODONE HYDROCHLORIDE 10 MG: 10 TABLET, FILM COATED, EXTENDED RELEASE ORAL at 08:45

## 2021-02-13 RX ADMIN — METFORMIN ER 750 MG 750 MG: 750 TABLET ORAL at 08:47

## 2021-02-13 RX ADMIN — DILTIAZEM HYDROCHLORIDE 120 MG: 30 TABLET, FILM COATED ORAL at 08:45

## 2021-02-13 RX ADMIN — SITAGLIPTIN 100 MG: 100 TABLET, FILM COATED ORAL at 08:45

## 2021-02-13 RX ADMIN — APIXABAN 5 MG: 5 TABLET, FILM COATED ORAL at 08:45

## 2021-02-13 RX ADMIN — INSULIN LISPRO 2 UNITS: 100 INJECTION, SOLUTION INTRAVENOUS; SUBCUTANEOUS at 08:47

## 2021-02-13 RX ADMIN — SODIUM CHLORIDE 125 ML/HR: 0.9 INJECTION, SOLUTION INTRAVENOUS at 08:47

## 2021-02-13 RX ADMIN — LISINOPRIL 40 MG: 20 TABLET ORAL at 08:44

## 2021-02-13 NOTE — PHYSICAL THERAPY NOTE
PHYSICAL THERAPY NOTE          Patient Name: Mimi Caraballo  Today's Date: 2/13/2021     PT consult received  Spoke with nurse, Ivan Ramírez who reports increased bleeding at fixator/operative site  Awaiting podiatry to assess patient at bedside  Request to hold on eval until podiatry evaluates  Will follow and complete eval when appropriate    Donn Jaime, PT

## 2021-02-13 NOTE — PLAN OF CARE
Problem: Potential for Falls  Goal: Patient will remain free of falls  Description: INTERVENTIONS:  - Assess patient frequently for physical needs  -  Identify cognitive and physical deficits and behaviors that affect risk of falls    -  [place] fall precautions as indicated by assessment   - Educate patient/family on patient safety including physical limitations  - Instruct patient to call for assistance with activity based on assessment  - Modify environment to reduce risk of injury  - Consider OT/PT consult to assist with strengthening/mobility  Outcome: Progressing     Problem: Prexisting or High Potential for Compromised Skin Integrity  Goal: Skin integrity is maintained or improved  Description: INTERVENTIONS:  - Identify patients at risk for skin breakdown  - Assess and monitor skin integrity  - Assess and monitor nutrition and hydration status  - Monitor labs   - Assess for incontinence   - Turn and reposition patient  - Assist with mobility/ambulation  - Relieve pressure over bony prominences  - Avoid friction and shearing  - Provide appropriate hygiene as needed including keeping skin clean and dry  - Evaluate need for skin moisturizer/barrier cream  - Collaborate with interdisciplinary team   - Patient/family teaching  - Consider wound care consult   Outcome: Progressing     Problem: PAIN - ADULT  Goal: Verbalizes/displays adequate comfort level or baseline comfort level  Description: Interventions:  - Encourage patient to monitor pain and request assistance  - Assess pain using appropriate pain scale  - Administer analgesics based on type and severity of pain and evaluate response  - Implement non-pharmacological measures as appropriate and evaluate response  - Consider cultural and social influences on pain and pain management  - Notify physician/advanced practitioner if interventions unsuccessful or patient reports new pain  Outcome: Progressing     Problem: INFECTION - ADULT  Goal: Absence or prevention of progression during hospitalization  Description: INTERVENTIONS:  - Assess and monitor for signs and symptoms of infection  - Monitor lab/diagnostic results  - Monitor all insertion sites, i e  indwelling lines, tubes, and drains  - Monitor endotracheal if appropriate and nasal secretions for changes in amount and color  - [place] appropriate cooling/warming therapies per order  - Administer medications as ordered  - Instruct and encourage patient and family to use good hand hygiene technique  - Identify and instruct in appropriate isolation precautions for identified infection/condition  Outcome: Progressing  Goal: Absence of fever/infection during neutropenic period  Description: INTERVENTIONS:  - Monitor WBC    Outcome: Progressing     Problem: SAFETY ADULT  Goal: Patient will remain free of falls  Description: INTERVENTIONS:  - Assess patient frequently for physical needs  -  Identify cognitive and physical deficits and behaviors that affect risk of falls    -  [place] fall precautions as indicated by assessment   - Educate patient/family on patient safety including physical limitations  - Instruct patient to call for assistance with activity based on assessment  - Modify environment to reduce risk of injury  - Consider OT/PT consult to assist with strengthening/mobility  Outcome: Progressing  Goal: Maintain or return to baseline ADL function  Description: INTERVENTIONS:  -  Assess patient's ability to carry out ADLs; assess patient's baseline for ADL function and identify physical deficits which impact ability to perform ADLs (bathing, care of mouth/teeth, toileting, grooming, dressing, etc )  - Assess/evaluate cause of self-care deficits   - Assess range of motion  - Assess patient's mobility; develop plan if impaired  - Assess patient's need for assistive devices and provide as appropriate  - Encourage maximum independence but intervene and supervise when necessary  - Involve family in performance of ADLs  - Assess for home care needs following discharge   - Consider OT consult to assist with ADL evaluation and planning for discharge  - Provide patient education as appropriate  Outcome: Progressing  Goal: Maintain or return mobility status to optimal level  Description: INTERVENTIONS:  - Assess patient's baseline mobility status (ambulation, transfers, stairs, etc )    - Identify cognitive and physical deficits and behaviors that affect mobility  - Identify mobility aids required to assist with transfers and/or ambulation (gait belt, sit-to-stand, lift, walker, cane, etc )  - [place] fall precautions as indicated by assessment  - Record patient progress and toleration of activity level on Mobility SBAR; progress patient to next Phase/Stage  - Instruct patient to call for assistance with activity based on assessment  - Consider rehabilitation consult to assist with strengthening/weightbearing, etc   Outcome: Progressing     Problem: DISCHARGE PLANNING  Goal: Discharge to home or other facility with appropriate resources  Description: INTERVENTIONS:  - Identify barriers to discharge w/patient and caregiver  - Arrange for needed discharge resources and transportation as appropriate  - Identify discharge learning needs (meds, wound care, etc )  - Arrange for interpretive services to assist at discharge as needed  - Refer to Case Management Department for coordinating discharge planning if the patient needs post-hospital services based on physician/advanced practitioner order or complex needs related to functional status, cognitive ability, or social support system  Outcome: Progressing     Problem: Knowledge Deficit  Goal: Patient/family/caregiver demonstrates understanding of disease process, treatment plan, medications, and discharge instructions  Description: Complete learning assessment and assess knowledge base    Interventions:  - Provide teaching at level of understanding  - Provide teaching via preferred learning methods  Outcome: Progressing     Problem: RESPIRATORY - ADULT  Goal: Achieves optimal ventilation and oxygenation  Description: INTERVENTIONS:  - Assess for changes in respiratory status  - Assess for changes in mentation and behavior  - Position to facilitate oxygenation and minimize respiratory effort  - Oxygen administered by appropriate delivery if ordered  - Initiate smoking cessation education as indicated  - Encourage broncho-pulmonary hygiene including cough, deep breathe, Incentive Spirometry  - Assess the need for suctioning and aspirate as needed  - Assess and instruct to report SOB or any respiratory difficulty  - Respiratory Therapy support as indicated  Outcome: Progressing     Problem: METABOLIC, FLUID AND ELECTROLYTES - ADULT  Goal: Glucose maintained within target range  Description: INTERVENTIONS:  - Monitor Blood Glucose as ordered  - Assess for signs and symptoms of hyperglycemia and hypoglycemia  - Administer ordered medications to maintain glucose within target range  - Assess nutritional intake and initiate nutrition service referral as needed  Outcome: Progressing     Problem: MUSCULOSKELETAL - ADULT  Goal: Maintain or return mobility to safest level of function  Description: INTERVENTIONS:  - Assess patient's ability to carry out ADLs; assess patient's baseline for ADL function and identify physical deficits which impact ability to perform ADLs (bathing, care of mouth/teeth, toileting, grooming, dressing, etc )  - Assess/evaluate cause of self-care deficits   - Assess range of motion  - Assess patient's mobility  - Assess patient's need for assistive devices and provide as appropriate  - Encourage maximum independence but intervene and supervise when necessary  - Involve family in performance of ADLs  - Assess for home care needs following discharge   - Consider OT consult to assist with ADL evaluation and planning for discharge  - Provide patient education as appropriate  Outcome: Progressing  Goal: Maintain proper alignment of affected body part  Description: INTERVENTIONS:  - Support, maintain and protect limb and body alignment  - Provide patient/ family with appropriate education  Outcome: Progressing     Problem: SKIN/TISSUE INTEGRITY - ADULT  Goal: Skin integrity remains intact  Description: INTERVENTIONS  - Identify patients at risk for skin breakdown  - Assess and monitor skin integrity  - Assess and monitor nutrition and hydration status  - Monitor labs (i e  albumin)  - Assess for incontinence   - Turn and reposition patient  - Assist with mobility/ambulation  - Relieve pressure over bony prominences  - Avoid friction and shearing  - Provide appropriate hygiene as needed including keeping skin clean and dry  - Evaluate need for skin moisturizer/barrier cream  - Collaborate with interdisciplinary team (i e  Nutrition, Rehabilitation, etc )   - Patient/family teaching  Outcome: Progressing  Goal: Incision(s), wounds(s) or drain site(s) healing without S/S of infection  Description: INTERVENTIONS  - Assess and document risk factors for skin impairment   - Assess and document dressing, incision, wound bed, drain sites and surrounding tissue  - Consider nutrition services referral as needed  - Oral mucous membranes remain intact  - Provide patient/ family education  Outcome: Progressing     Problem: HEMATOLOGIC - ADULT  Goal: Maintains hematologic stability  Description: INTERVENTIONS  - Assess for signs and symptoms of bleeding or hemorrhage  - Monitor labs  - Administer supportive blood products/factors as ordered and appropriate  Outcome: Progressing     Problem: Nutrition/Hydration-ADULT  Goal: Nutrient/Hydration intake appropriate for improving, restoring or maintaining nutritional needs  Description: Monitor and assess patient's nutrition/hydration status for malnutrition  Collaborate with interdisciplinary team and initiate plan and interventions as ordered  Monitor patient's weight and dietary intake as ordered or per policy  Utilize nutrition screening tool and intervene as necessary  Determine patient's food preferences and provide high-protein, high-caloric foods as appropriate       INTERVENTIONS:  - Monitor oral intake, urinary output, labs, and treatment plans  - Assess nutrition and hydration status and recommend course of action  - Evaluate amount of meals eaten  - Assist patient with eating if necessary   - Allow adequate time for meals  - Recommend/ encourage appropriate diets, oral nutritional supplements, and vitamin/mineral supplements  - Order, calculate, and assess calorie counts as needed  - Recommend, monitor, and adjust tube feedings and TPN/PPN based on assessed needs  - Assess need for intravenous fluids  - Provide specific nutrition/hydration education as appropriate  - Include patient/family/caregiver in decisions related to nutrition  Outcome: Progressing

## 2021-02-13 NOTE — OCCUPATIONAL THERAPY NOTE
OCCUPATIONAL THERAPY CANCELLATION     OT orders received and chart reviewed  Spoke w/ RN, whom states fixator is bleeding w/ each transfer when using urinal  Requested therapy wait until podiatry sees pt  Will perform OT evaluation at a later time       Martine Byrnes MS, OTR/L

## 2021-02-14 NOTE — DISCHARGE SUMMARY
[facility] SUMMARY     Patient Name: Ricardo Gaffney  Age & Sex: 78 y o  male   MRN: [id number]  Unit/Bed#: E5 -01   Encounter: [id number]  Length of Stay: 0 days    Principal Problem:    Post-operative state      No new Assessment & Plan notes have been filed under this hospital service since the last note was generated  Service: Podiatry      SUBJECTIVE     The patient was seen, evaluated, and assessed at bedside today  The patient was awake, alert, and in no acute distress  No acute events overnight  The patient reports no pain to his right foot or leg  I was alerted by nursing overnight that the patient's right foot bandage was experiencing strike-through after the patient had "bumped his foot" while using the restroom  Patient denies N/V/F/chills/SOB/CP  Review of Systems  Constitutional: Negative  HENT: Negative  Eyes: Negative  Respiratory: Negative  Cardiovascular: Negative  Gastrointestinal: Negative  Musculoskeletal: s/p right foot arthrodesis, tendo-achilles lengthening, application of multiplane external fixator  Skin: Multiple surgical incisions and pin tracts   Neurological: peripheral neuropathy  Psych: Negative  OBJECTIVE     Vitals:    02/13/21 0700   BP: 160/94   Pulse: (!) 112   Resp:    Temp: 98 °F (36 7 °C)   SpO2: 99%       Physical Exam:     General:  Alert, cooperative, and in no distress  Lower extremity exam:  Cardiovascular status at baseline  Neurological status at baseline  Musculoskeletal status at baseline  No calf tenderness noted bilaterally  Due to bandage strike-through overnight, patient's dressings were removed today to reveal slight venous leakage out of the patient's right lateral foot incision site  This incision site was partially sutured at bedside using 4-0 Prolene suture as described below   No additional bleeding was noted and the foot was re-dressed with xeroform over incision sites, 4x4 gauze, ABD padding, Kerlex, and ACE bandage    [address] COURSE     02/12/2021: Patient underwent right foot arthrodesis of the 1st TMTJ, tendo-achilles lengthening, and application of external fixator  Patient tolerated the procedure well and was admitted for 23-hour overnight observation post-procedure  02/13/2021: Patient's right foot began bleeding overnight after he "bumped it while using the restroom " Patient states there was no pain associated with this  Upon examination it was found that there was a slight amount of venous ooze from the lateral incision site of the right foot  After verbal consent was obtained, The incision site was painted with betadine in a sterile fashion, 4-0 Prolene suture was used to partially close the right foot lateral incision with simple interrupted and horizontal mattress technique  The right foot was then re-dressed as described above  The patient was discharged to home with his normal home medication regimen with the addition of Oxycodone 5/325 for analgesia  He was also discharged with crutches to assist in NWB of the RLE  DISCHARGE INFORMATION     PCP at Discharge: Lazarus Richardson MD    Admitting Provider: Ariane Leonard  Admission Date: 2/12/2021     Discharge Provider: Ariane Leonard  Discharge Date: 02/13/21    Discharge Disposition: Home  Discharge Condition: Stable  Discharge with Lines: No  Discharge Diet: diabetic  Activity Restrictions: NWB to RLE  Test Results Pending at Discharge: none  Medications at Discharge: See after visit summary for reconciled discharge medications provided to patient and family  Discharge Diagnoses:  Principal Problem:    Post-operative state      Consulting Providers:  PT  OT    Diagnostic & Therapeutic Procedures Performed:  No results found      Code Status: Prior  Advance Directive and Living Will:      Power of :    POLST:      FOLLOW-UP     PCP Outpatient Follow-up:    Consulting Providers Follow-up:    Active Issues Requiring Follow-Up:    Discharge Statement:  I spent 25 minutes discharging the patient  This time was spent on the day of discharge  I had direct contact with the patient on the day of discharge  Additional documentation is required if more than 30 minutes were spent on discharge  Portions of the record may have been created with voice recognition software  Occasional wrong word or "sound a like" substitutions may have occurred due to the inherent limitations of voice recognition software    Read the chart carefully and recognize, using context, where substitutions have occurred   ==  Sangita Hernandez, [address] [place] [address]  Podiatric Medicine & Surgery PGY-1

## 2021-02-16 ENCOUNTER — APPOINTMENT (EMERGENCY)
Dept: RADIOLOGY | Facility: HOSPITAL | Age: 71
DRG: 856 | End: 2021-02-16
Payer: COMMERCIAL

## 2021-02-16 ENCOUNTER — HOSPITAL ENCOUNTER (INPATIENT)
Facility: HOSPITAL | Age: 71
LOS: 22 days | Discharge: NURSING, REHAB OR LONG-TERM CARE | DRG: 856 | End: 2021-03-10
Attending: EMERGENCY MEDICINE | Admitting: PODIATRIST
Payer: COMMERCIAL

## 2021-02-16 DIAGNOSIS — E78.5 HYPERLIPIDEMIA, UNSPECIFIED HYPERLIPIDEMIA TYPE: Chronic | ICD-10-CM

## 2021-02-16 DIAGNOSIS — Q66.01 CONGENITAL TALIPES EQUINOVARUS, RIGHT FOOT: Primary | ICD-10-CM

## 2021-02-16 DIAGNOSIS — T81.32XA DEEP DISRUPTION OR DEHISCENCE OF OPERATION WOUND, INITIAL ENCOUNTER: ICD-10-CM

## 2021-02-16 DIAGNOSIS — A41.9 SEPSIS WITHOUT ACUTE ORGAN DYSFUNCTION, DUE TO UNSPECIFIED ORGANISM (HCC): ICD-10-CM

## 2021-02-16 DIAGNOSIS — E11.40 TYPE 2 DIABETES MELLITUS WITH DIABETIC NEUROPATHY, UNSPECIFIED WHETHER LONG TERM INSULIN USE (HCC): Chronic | ICD-10-CM

## 2021-02-16 DIAGNOSIS — A41.9 SEPSIS (HCC): ICD-10-CM

## 2021-02-16 DIAGNOSIS — T81.32XD DEEP DISRUPTION OR DEHISCENCE OF OPERATION WOUND, SUBSEQUENT ENCOUNTER: ICD-10-CM

## 2021-02-16 DIAGNOSIS — Z01.818 PREOPERATIVE CLEARANCE: ICD-10-CM

## 2021-02-16 PROBLEM — K59.03 DRUG-INDUCED CONSTIPATION: Status: ACTIVE | Noted: 2021-02-16

## 2021-02-16 PROBLEM — L03.119 CELLULITIS OF EXTREMITY: Status: ACTIVE | Noted: 2021-02-16

## 2021-02-16 PROBLEM — A40.9 SEPSIS DUE TO STREPTOCOCCUS SPECIES WITHOUT ACUTE ORGAN DYSFUNCTION (HCC): Status: ACTIVE | Noted: 2021-02-16

## 2021-02-16 PROBLEM — L02.91 ABSCESS: Status: ACTIVE | Noted: 2021-02-16

## 2021-02-16 LAB
ALBUMIN SERPL BCP-MCNC: 2.9 G/DL (ref 3.5–5)
ALP SERPL-CCNC: 120 U/L (ref 46–116)
ALT SERPL W P-5'-P-CCNC: 61 U/L (ref 12–78)
AMORPH URATE CRY URNS QL MICRO: ABNORMAL /HPF
ANION GAP SERPL CALCULATED.3IONS-SCNC: 9 MMOL/L (ref 4–13)
APTT PPP: 37 SECONDS (ref 23–37)
AST SERPL W P-5'-P-CCNC: 52 U/L (ref 5–45)
BACTERIA UR QL AUTO: ABNORMAL /HPF
BASOPHILS # BLD AUTO: 0.02 THOUSANDS/ΜL (ref 0–0.1)
BASOPHILS NFR BLD AUTO: 0 % (ref 0–1)
BILIRUB SERPL-MCNC: 0.77 MG/DL (ref 0.2–1)
BILIRUB UR QL STRIP: NEGATIVE
BUN SERPL-MCNC: 12 MG/DL (ref 5–25)
CALCIUM ALBUM COR SERPL-MCNC: 9.7 MG/DL (ref 8.3–10.1)
CALCIUM SERPL-MCNC: 8.8 MG/DL (ref 8.3–10.1)
CHLORIDE SERPL-SCNC: 106 MMOL/L (ref 100–108)
CLARITY UR: CLEAR
CO2 SERPL-SCNC: 22 MMOL/L (ref 21–32)
COARSE GRAN CASTS URNS QL MICRO: ABNORMAL /LPF
COLOR UR: YELLOW
CREAT SERPL-MCNC: 1.05 MG/DL (ref 0.6–1.3)
EOSINOPHIL # BLD AUTO: 0.01 THOUSAND/ΜL (ref 0–0.61)
EOSINOPHIL NFR BLD AUTO: 0 % (ref 0–6)
ERYTHROCYTE [DISTWIDTH] IN BLOOD BY AUTOMATED COUNT: 13.8 % (ref 11.6–15.1)
FINE GRAN CASTS URNS QL MICRO: ABNORMAL /LPF
FLUAV RNA RESP QL NAA+PROBE: NEGATIVE
FLUBV RNA RESP QL NAA+PROBE: NEGATIVE
GFR SERPL CREATININE-BSD FRML MDRD: 73 ML/MIN/1.73SQ M
GLUCOSE SERPL-MCNC: 175 MG/DL (ref 65–140)
GLUCOSE SERPL-MCNC: 202 MG/DL (ref 65–140)
GLUCOSE UR STRIP-MCNC: ABNORMAL MG/DL
HCT VFR BLD AUTO: 37.3 % (ref 36.5–49.3)
HGB BLD-MCNC: 12.3 G/DL (ref 12–17)
HGB UR QL STRIP.AUTO: ABNORMAL
IMM GRANULOCYTES # BLD AUTO: 0.07 THOUSAND/UL (ref 0–0.2)
IMM GRANULOCYTES NFR BLD AUTO: 1 % (ref 0–2)
INR PPP: 1.48 (ref 0.84–1.19)
KETONES UR STRIP-MCNC: ABNORMAL MG/DL
LACTATE SERPL-SCNC: 1.2 MMOL/L (ref 0.5–2)
LACTATE SERPL-SCNC: 2.1 MMOL/L (ref 0.5–2)
LEUKOCYTE ESTERASE UR QL STRIP: ABNORMAL
LYMPHOCYTES # BLD AUTO: 0.76 THOUSANDS/ΜL (ref 0.6–4.47)
LYMPHOCYTES NFR BLD AUTO: 5 % (ref 14–44)
MCH RBC QN AUTO: 31.1 PG (ref 26.8–34.3)
MCHC RBC AUTO-ENTMCNC: 33 G/DL (ref 31.4–37.4)
MCV RBC AUTO: 94 FL (ref 82–98)
MONOCYTES # BLD AUTO: 0.82 THOUSAND/ΜL (ref 0.17–1.22)
MONOCYTES NFR BLD AUTO: 6 % (ref 4–12)
MUCOUS THREADS UR QL AUTO: ABNORMAL
NEUTROPHILS # BLD AUTO: 12.97 THOUSANDS/ΜL (ref 1.85–7.62)
NEUTS SEG NFR BLD AUTO: 88 % (ref 43–75)
NITRITE UR QL STRIP: NEGATIVE
NON-SQ EPI CELLS URNS QL MICRO: ABNORMAL /HPF
NRBC BLD AUTO-RTO: 0 /100 WBCS
PH UR STRIP.AUTO: 6 [PH]
PLATELET # BLD AUTO: 186 THOUSANDS/UL (ref 149–390)
PMV BLD AUTO: 11.3 FL (ref 8.9–12.7)
POTASSIUM SERPL-SCNC: 3.7 MMOL/L (ref 3.5–5.3)
PROCALCITONIN SERPL-MCNC: 1.08 NG/ML
PROT SERPL-MCNC: 7.1 G/DL (ref 6.4–8.2)
PROT UR STRIP-MCNC: ABNORMAL MG/DL
PROTHROMBIN TIME: 17.9 SECONDS (ref 11.6–14.5)
RBC # BLD AUTO: 3.96 MILLION/UL (ref 3.88–5.62)
RBC #/AREA URNS AUTO: ABNORMAL /HPF
RSV RNA RESP QL NAA+PROBE: NEGATIVE
SARS-COV-2 RNA RESP QL NAA+PROBE: NEGATIVE
SODIUM SERPL-SCNC: 137 MMOL/L (ref 136–145)
SP GR UR STRIP.AUTO: 1.02 (ref 1–1.03)
TROPONIN I SERPL-MCNC: <0.02 NG/ML
UROBILINOGEN UR QL STRIP.AUTO: 1 E.U./DL
WBC # BLD AUTO: 14.65 THOUSAND/UL (ref 4.31–10.16)
WBC #/AREA URNS AUTO: ABNORMAL /HPF

## 2021-02-16 PROCEDURE — 96365 THER/PROPH/DIAG IV INF INIT: CPT

## 2021-02-16 PROCEDURE — 81001 URINALYSIS AUTO W/SCOPE: CPT | Performed by: EMERGENCY MEDICINE

## 2021-02-16 PROCEDURE — 85025 COMPLETE CBC W/AUTO DIFF WBC: CPT | Performed by: EMERGENCY MEDICINE

## 2021-02-16 PROCEDURE — 80053 COMPREHEN METABOLIC PANEL: CPT | Performed by: EMERGENCY MEDICINE

## 2021-02-16 PROCEDURE — 36415 COLL VENOUS BLD VENIPUNCTURE: CPT | Performed by: EMERGENCY MEDICINE

## 2021-02-16 PROCEDURE — 87040 BLOOD CULTURE FOR BACTERIA: CPT | Performed by: EMERGENCY MEDICINE

## 2021-02-16 PROCEDURE — NC001 PR NO CHARGE: Performed by: PODIATRIST

## 2021-02-16 PROCEDURE — 73620 X-RAY EXAM OF FOOT: CPT

## 2021-02-16 PROCEDURE — 84484 ASSAY OF TROPONIN QUANT: CPT | Performed by: EMERGENCY MEDICINE

## 2021-02-16 PROCEDURE — 96361 HYDRATE IV INFUSION ADD-ON: CPT

## 2021-02-16 PROCEDURE — 99285 EMERGENCY DEPT VISIT HI MDM: CPT

## 2021-02-16 PROCEDURE — 93005 ELECTROCARDIOGRAM TRACING: CPT

## 2021-02-16 PROCEDURE — 84145 PROCALCITONIN (PCT): CPT | Performed by: EMERGENCY MEDICINE

## 2021-02-16 PROCEDURE — 99285 EMERGENCY DEPT VISIT HI MDM: CPT | Performed by: EMERGENCY MEDICINE

## 2021-02-16 PROCEDURE — 85610 PROTHROMBIN TIME: CPT | Performed by: EMERGENCY MEDICINE

## 2021-02-16 PROCEDURE — 85730 THROMBOPLASTIN TIME PARTIAL: CPT | Performed by: EMERGENCY MEDICINE

## 2021-02-16 PROCEDURE — 83605 ASSAY OF LACTIC ACID: CPT | Performed by: EMERGENCY MEDICINE

## 2021-02-16 PROCEDURE — 96367 TX/PROPH/DG ADDL SEQ IV INF: CPT

## 2021-02-16 PROCEDURE — 0241U HB NFCT DS VIR RESP RNA 4 TRGT: CPT | Performed by: EMERGENCY MEDICINE

## 2021-02-16 PROCEDURE — 96366 THER/PROPH/DIAG IV INF ADDON: CPT

## 2021-02-16 RX ORDER — SENNOSIDES 8.6 MG
1 TABLET ORAL
Status: DISCONTINUED | OUTPATIENT
Start: 2021-02-16 | End: 2021-02-19

## 2021-02-16 RX ORDER — HYDROMORPHONE HCL/PF 1 MG/ML
0.5 SYRINGE (ML) INJECTION EVERY 4 HOURS PRN
Status: DISCONTINUED | OUTPATIENT
Start: 2021-02-16 | End: 2021-03-10 | Stop reason: HOSPADM

## 2021-02-16 RX ORDER — ACETAMINOPHEN 325 MG/1
975 TABLET ORAL EVERY 6 HOURS PRN
Status: DISCONTINUED | OUTPATIENT
Start: 2021-02-16 | End: 2021-02-17 | Stop reason: HOSPADM

## 2021-02-16 RX ORDER — ACETAMINOPHEN 160 MG/5ML
1 SUSPENSION, ORAL (FINAL DOSE FORM) ORAL ONCE
Status: COMPLETED | OUTPATIENT
Start: 2021-02-16 | End: 2021-02-16

## 2021-02-16 RX ORDER — OXYCODONE HYDROCHLORIDE 10 MG/1
10 TABLET ORAL EVERY 4 HOURS PRN
Status: DISCONTINUED | OUTPATIENT
Start: 2021-02-16 | End: 2021-03-10 | Stop reason: HOSPADM

## 2021-02-16 RX ORDER — OXYCODONE HYDROCHLORIDE 5 MG/1
5 TABLET ORAL EVERY 4 HOURS PRN
Status: DISCONTINUED | OUTPATIENT
Start: 2021-02-16 | End: 2021-02-18

## 2021-02-16 RX ORDER — ONDANSETRON 2 MG/ML
1 INJECTION INTRAMUSCULAR; INTRAVENOUS ONCE
Status: COMPLETED | OUTPATIENT
Start: 2021-02-16 | End: 2021-02-16

## 2021-02-16 RX ORDER — DILTIAZEM HYDROCHLORIDE 60 MG/1
120 TABLET, FILM COATED ORAL 2 TIMES DAILY
Status: DISCONTINUED | OUTPATIENT
Start: 2021-02-16 | End: 2021-02-21

## 2021-02-16 RX ORDER — LISINOPRIL 20 MG/1
40 TABLET ORAL DAILY
Status: DISCONTINUED | OUTPATIENT
Start: 2021-02-17 | End: 2021-02-17

## 2021-02-16 RX ORDER — POLYETHYLENE GLYCOL 3350 17 G/17G
17 POWDER, FOR SOLUTION ORAL DAILY
Status: DISCONTINUED | OUTPATIENT
Start: 2021-02-17 | End: 2021-02-21

## 2021-02-16 RX ORDER — CHLORHEXIDINE GLUCONATE 0.12 MG/ML
15 RINSE ORAL ONCE
Status: DISCONTINUED | OUTPATIENT
Start: 2021-02-16 | End: 2021-02-18

## 2021-02-16 RX ORDER — ATORVASTATIN CALCIUM 40 MG/1
40 TABLET, FILM COATED ORAL DAILY
Status: DISCONTINUED | OUTPATIENT
Start: 2021-02-17 | End: 2021-03-10 | Stop reason: HOSPADM

## 2021-02-16 RX ORDER — ONDANSETRON 2 MG/ML
4 INJECTION INTRAMUSCULAR; INTRAVENOUS EVERY 6 HOURS PRN
Status: DISCONTINUED | OUTPATIENT
Start: 2021-02-16 | End: 2021-02-17 | Stop reason: HOSPADM

## 2021-02-16 RX ORDER — METRONIDAZOLE 500 MG/1
500 TABLET ORAL EVERY 8 HOURS
Status: DISCONTINUED | OUTPATIENT
Start: 2021-02-16 | End: 2021-02-17

## 2021-02-16 RX ORDER — SODIUM CHLORIDE 9 MG/ML
75 INJECTION, SOLUTION INTRAVENOUS CONTINUOUS
Status: DISCONTINUED | OUTPATIENT
Start: 2021-02-16 | End: 2021-02-23

## 2021-02-16 RX ADMIN — SODIUM CHLORIDE 1000 ML: 0.9 INJECTION, SOLUTION INTRAVENOUS at 16:48

## 2021-02-16 RX ADMIN — DILTIAZEM HYDROCHLORIDE 120 MG: 60 TABLET, FILM COATED ORAL at 19:56

## 2021-02-16 RX ADMIN — SENNOSIDES 8.6 MG: 8.6 TABLET, FILM COATED ORAL at 23:10

## 2021-02-16 RX ADMIN — METRONIDAZOLE 500 MG: 500 TABLET ORAL at 19:51

## 2021-02-16 RX ADMIN — SODIUM CHLORIDE 75 ML/HR: 0.9 INJECTION, SOLUTION INTRAVENOUS at 20:56

## 2021-02-16 RX ADMIN — APIXABAN 5 MG: 5 TABLET, FILM COATED ORAL at 19:56

## 2021-02-16 RX ADMIN — METRONIDAZOLE 500 MG: 500 INJECTION, SOLUTION INTRAVENOUS at 17:36

## 2021-02-16 RX ADMIN — VANCOMYCIN HYDROCHLORIDE 2000 MG: 10 INJECTION, POWDER, LYOPHILIZED, FOR SOLUTION INTRAVENOUS at 16:56

## 2021-02-16 RX ADMIN — OXYCODONE HYDROCHLORIDE 10 MG: 10 TABLET ORAL at 19:51

## 2021-02-16 RX ADMIN — CEFEPIME HYDROCHLORIDE 2000 MG: 2 INJECTION, POWDER, FOR SOLUTION INTRAVENOUS at 16:56

## 2021-02-16 NOTE — ED ATTENDING ATTESTATION
Final Diagnosis:  1  Congenital talipes equinovarus, right foot    2  Preoperative clearance    3  Hyperlipidemia, unspecified hyperlipidemia type    4  Type 2 diabetes mellitus with diabetic neuropathy, unspecified whether long term insulin use ([facility] )    5  Sepsis without acute organ dysfunction, due to unspecified organism ([facility] )    6  Sepsis ([facility] )           Luisa Muniz MD, saw and evaluated the patient  All available labs and X-rays were ordered by me or the resident and have been reviewed by myself  I discussed the patient with the resident / non-physician and agree with the resident's / non-physician practitioner's findings and plan as documented in the resident's / non-physician practicitioner's note, except where noted  At this point, I agree with the current assessment done in the ED  I was present during key portions of all procedures performed unless otherwise stated  Chief Complaint   Patient presents with    Altered Mental Status     Per EMS pt had recent surgery on his right foot  Today pt had sudden onset of AMS and fevers and was diaphoretic and shaking  This is a 78 y o  male presenting for evaluation of RIGHT foot on Feb 12th  It was for an ulcer (per patient)  He had AMS at home with fever 102F (650mg Tylenol prehospital) and EMS was called  Pre-hospital wanted to give diltiazem     The patient is s/p right foot arthrodesis, tendo-achilles lengthening, application of multiplane external fixator    PMH:   has a past medical history of Arthritis, Atrial fibrillation ([address] ), Benign prostate hyperplasia (04/2002), Cellulitis, Colon polyp (2006), Diabetes mellitus ([address] ), Hearing aid worn, Hearing loss, History of clubfoot, History of pneumonia, History of TIA (transient ischemic attack) (04/25/2017), Hyperlipidemia (5/15/2014), Hypertension, Infectious viral hepatitis, Irregular heart beat, Neuropathy, Osteomyelitis ([address] ), Right middle lobe pulmonary nodule (11/6/2018), Seasickness, Teeth missing, Type 2 diabetes mellitus with diabetic neuropathy ([facility] ) (2018), Wears glasses, and Wound, open  PSH:   has a past surgical history that includes Vasectomy (); Toe amputation (Right); Colonoscopy; pr part remv bone metatarsal head,ea (Right, 2020); pr secd clos surg wnd exten/complic (Right, ); Club foot release (Bilateral); Bunionectomy (Right, 2018); pr fusion foot bones,midtarsal,osteotmy (Right, 2021); pr length/short leg/ankl tendon,single (Right, 2021); External fixator application (Right, 5538); Incision and drainage of wound (Right, 2021); and Foot hardware removal (Right, 2021)  Social:  Social History     Substance and Sexual Activity   Alcohol Use Yes    Frequency: 2-4 times a month    Drinks per session: 1 or 2    Comment: socially     Social History     Tobacco Use   Smoking Status Former Smoker    Types: Cigarettes    Quit date: 1988    Years since quittin 3   Smokeless Tobacco Former User     Social History     Substance and Sexual Activity   Drug Use Not Currently     PE:  Vitals:    21 2234 21 0243 21 0700 21 1102   BP: 108/71 116/74 120/71 125/79   BP Location:       Pulse: 85 97 89 81   Resp:    Temp: 99 7 °F (37 6 °C)  99 °F (37 2 °C) 98 9 °F (37 2 °C)   TempSrc: Oral      SpO2: 97% 99% 99% 99%   Weight:       Height:         General: VSS, NAD, awake, alert  Well-nourished, well-developed  Appears stated age  Head: Normocephalic, atraumatic, nontender  Eyes: PERRL, EOM-I  No diplopia  No hyphema  No subconjunctival hemorrhages  Symmetrical lids  ENTAtraumatic external nose and ears  Dry MM  No stridor  Normal phonation  No drooling  Base of mouth is soft  No mastoid tenderness  Neck: Symmetric, trachea midline  No JVD  CV: Tachycardic    Peripheral pulses +2 throughout  No chest wall tenderness     Lungs:   Unlabored   No retractions  No crepitus  No tachypnea  No paradoxical motion  Abd: +BS, soft, NT/ND    MSK:   FROM   No lower extremity edema  Back:   No CVAT  Skin: Dry, intact  The RIGHT foot is in a halo fixation  There is redness  The distal foot looks like there is purulence noted  Tender  Hematoma present at site as well  Neuro: AAOx3, GCS 15, CN II-XII grossly intact  Motor grossly intact  Psychiatric/Behavioral: Appropriate mood and affect   Exam: deferred  A:  - Sepsis from post-operative infection  P:  - vanc/cefepime  - podiatry was at bedside when I talked to patient; wanted flagyl as well  Will add   - septic workup  - consult podiatry  - wife at bedside and mentions rigors as well at home  - 13 point ROS was performed and all are normal unless stated in the history above  - Nursing note reviewed  Vitals reviewed  - Orders placed by myself and/or advanced practitioner / resident     - Previous chart was reviewed  - No language barrier    - History obtained from patient  - There are no limitations to the history obtained  - Critical care time: Not applicable for this patient       Code Status: Level 1 - Full Code  Advance Directive and Living Will:      Power of :    POLST:      Medications   atorvastatin (LIPITOR) tablet 40 mg (40 mg Oral Given 2/18/21 0817)   diltiazem (CARDIZEM) tablet 120 mg (120 mg Oral Given 2/18/21 0849)   polyethylene glycol (MIRALAX) packet 17 g (17 g Oral Given 2/18/21 0817)   senna (SENOKOT) tablet 8 6 mg (8 6 mg Oral Given 2/17/21 2137)   HYDROmorphone (DILAUDID) injection 0 5 mg (has no administration in time range)   oxyCODONE (ROXICODONE) immediate release tablet 10 mg (10 mg Oral Given 2/17/21 1338)   sodium chloride 0 9 % infusion (75 mL/hr Intravenous New Bag 2/18/21 0143)   cefepime (MAXIPIME) 2 g/50 mL dextrose IVPB (0 mg Intravenous Stopped 2/18/21 0900)   insulin lispro (HumaLOG) 100 units/mL subcutaneous injection 1-6 Units (2 Units Subcutaneous Given 2/18/21 1202)   lisinopril (ZESTRIL) tablet 40 mg (40 mg Oral Given 2/18/21 0817)   vancomycin (VANCOCIN) 1,250 mg in sodium chloride 0 9 % 250 mL IVPB (1,250 mg Intravenous New Bag 2/18/21 1342)   metroNIDAZOLE (FLAGYL) tablet 500 mg (500 mg Oral Given 2/18/21 1327)   apixaban (ELIQUIS) tablet 5 mg (5 mg Oral Given 2/18/21 0817)   acetaminophen (TYLENOL) tablet 650 mg (650 mg Oral Given 2/18/21 1326)   potassium chloride (K-DUR,KLOR-CON) CR tablet 40 mEq (40 mEq Oral Given 2/18/21 0817)   oxyCODONE (ROXICODONE) IR tablet 5 mg (has no administration in time range)   acetaminophen (FOR EMS ONLY) (TYLENOL) oral suspension 650 mg (0 mg Does not apply Given to EMS 2/16/21 1647)   ondansetron (FOR EMS ONLY) (ZOFRAN) 4 mg/2 mL injection 4 mg (0 mg Does not apply Given to EMS 2/16/21 1647)   sodium chloride 0 9 % bolus 1,000 mL (0 mL Intravenous Stopped 2/16/21 1855)   vancomycin (VANCOCIN) 2,000 mg in sodium chloride 0 9 % 500 mL IVPB (0 mg Intravenous Stopped 2/16/21 1919)   cefepime (MAXIPIME) 2 g/50 mL dextrose IVPB (0 mg Intravenous Stopped 2/16/21 1726)   metroNIDAZOLE (FLAGYL) IVPB (premix) 500 mg 100 mL (0 mg Intravenous Stopped 2/16/21 1816)   midazolam (VERSED) 2 mg/2 mL injection **ADS Override Pull** (  Override pull for Anesthesia 2/17/21 1601)   fentanyl citrate (PF) 100 MCG/2ML **ADS Override Pull** (  Override pull for Anesthesia 2/17/21 1604)   metoprolol (LOPRESSOR) injection 5 mg (5 mg Intravenous Given 2/18/21 0959)     XR foot right 3+ views   Final Result      Removal of the external fixation device  Otherwise stable alignment of the post surgical changes  Workstation performed: [id number]         XR foot 2 vw right   Final Result   Findings suspicious for infection involving the 4th and 5th tarsometatarsal joints, less conspicuous previously    Status post surgery         Workstation performed: [id number]           Orders Placed This Encounter   Procedures    ED ECG Documentation Only    COVID19, Influenza A/B, RSV PCR, [facility]    Blood culture #1    Blood culture #2    Wound culture and Gram stain    MRSA culture    Anaerobic culture and Gram stain    Wound culture and Gram stain    Anaerobic culture and Gram stain    Wound culture and Gram stain    XR foot 2 vw right    XR foot right 3+ views    CBC and differential    Comprehensive metabolic panel    Lactic acid, plasma    Troponin I    Protime-INR    Procalcitonin with AM Reflex    APTT    UA (URINE) with reflex to Scope    Lactic acid 2 Hours    Procalcitonin Reflex    Urine Microscopic    Basic metabolic panel    CBC (With Platelets)    Platelet count    Vancomycin, trough Draw level peripherally  Give dose immediately after trough (do NOT hold dose)  Call Pharmacy with any questions/concerns (Pharm Consult)   Protime-INR    Diet Tung/CHO Controlled; Consistent Carbohydrate Diet Level 2 (5 carb servings/75 grams CHO/meal)    Fingerstick Glucose (POCT)    48 Hour Telemetry Monitoring    Vital signs (specify frequency)    Non weight bearing    Apply SCD or Foot pumps    Aqua K    Nursing Communication Please do not ice or elevate  Thank you!     Bed rest    Level 1-Full Code: all life saving measures are indicated    [facility]    Inpatient consult to Infectious Diseases    EKG RESULTS    ECG 12 lead    ECG 12 lead    ECG 12 lead    INPATIENT ADMISSION     Labs Reviewed   CBC AND DIFFERENTIAL - Abnormal       Result Value Ref Range Status    WBC 14 65 (*) 4 31 - 10 16 Thousand/uL Final    RBC 3 96  3 88 - 5 62 Million/uL Final    Hemoglobin 12 3  12 0 - 17 0 g/dL Final    Hematocrit 37 3  36 5 - 49 3 % Final    MCV 94  82 - 98 fL Final    MCH 31 1  26 8 - 34 3 pg Final    MCHC 33 0  31 4 - 37 4 g/dL Final    RDW 13 8  11 6 - 15 1 % Final    MPV 11 3  8 9 - 12 7 fL Final    Platelets 176  693 - 390 Thousands/uL Final    nRBC 0  /100 WBCs Final    Neutrophils Relative 88 (*) 43 - 75 % Final    Immat GRANS % 1  0 - 2 % Final    Lymphocytes Relative 5 (*) 14 - 44 % Final    Monocytes Relative 6  4 - 12 % Final    Eosinophils Relative 0  0 - 6 % Final    Basophils Relative 0  0 - 1 % Final    Neutrophils Absolute 12 97 (*) 1 85 - 7 62 Thousands/µL Final    Immature Grans Absolute 0 07  0 00 - 0 20 Thousand/uL Final    Lymphocytes Absolute 0 76  0 60 - 4 47 Thousands/µL Final    Monocytes Absolute 0 82  0 17 - 1 22 Thousand/µL Final    Eosinophils Absolute 0 01  0 00 - 0 61 Thousand/µL Final    Basophils Absolute 0 02  0 00 - 0 10 Thousands/µL Final   COMPREHENSIVE METABOLIC PANEL - Abnormal    Sodium 137  136 - 145 mmol/L Final    Potassium 3 7  3 5 - 5 3 mmol/L Final    Chloride 106  100 - 108 mmol/L Final    CO2 22  21 - 32 mmol/L Final    ANION GAP 9  4 - 13 mmol/L Final    BUN 12  5 - 25 mg/dL Final    Creatinine 1 05  0 60 - 1 30 mg/dL Final    Comment: Standardized to IDMS reference method    Glucose 175 (*) 65 - 140 mg/dL Final    Comment: If the patient is fasting, the ADA then defines impaired fasting glucose as > 100 mg/dL and diabetes as > or equal to 123 mg/dL  Specimen collection should occur prior to Sulfasalazine administration due to the potential for falsely depressed results  Specimen collection should occur prior to Sulfapyridine administration due to the potential for falsely elevated results  Calcium 8 8  8 3 - 10 1 mg/dL Final    Corrected Calcium 9 7  8 3 - 10 1 mg/dL Final    AST 52 (*) 5 - 45 U/L Final    Comment: Specimen collection should occur prior to Sulfasalazine administration due to the potential for falsely depressed results  ALT 61  12 - 78 U/L Final    Comment: Specimen collection should occur prior to Sulfasalazine and/or Sulfapyridine administration due to the potential for falsely depressed results       Alkaline Phosphatase 120 (*) 46 - 116 U/L Final    Total Protein 7 1  6 4 - 8 2 g/dL Final    Albumin 2 9 (*) 3 5 - 5 0 g/dL Final    Total Bilirubin 0 77  0 20 - 1 00 mg/dL Final    Comment: Use of this assay is not recommended for patients undergoing treatment with eltrombopag due to the potential for falsely elevated results  eGFR 73  ml/min/1 73sq m Final    Narrative:     [place] guidelines for Chronic Kidney Disease (CKD):     Stage 1 with normal or high GFR (GFR > 90 mL/min/1 73 square meters)    Stage 2 Mild CKD (GFR = 60-89 mL/min/1 73 square meters)    Stage 3A Moderate CKD (GFR = 45-59 mL/min/1 73 square meters)    Stage 3B Moderate CKD (GFR = 30-44 mL/min/1 73 square meters)    Stage 4 Severe CKD (GFR = 15-29 mL/min/1 73 square meters)    Stage 5 End Stage CKD (GFR <15 mL/min/1 73 square meters)  Note: GFR calculation is accurate only with a steady state creatinine   LACTIC ACID, PLASMA - Abnormal    LACTIC ACID 2 1 (*) 0 5 - 2 0 mmol/L Final    Narrative:     Result may be elevated if tourniquet was used during collection  PROTIME-INR - Abnormal    Protime 17 9 (*) 11 6 - 14 5 seconds Final    INR 1 48 (*) 0 84 - 1 19 Final   PROCALCITONIN TEST - Abnormal    Procalcitonin 1 08 (*) <=0 25 ng/ml Final    Comment: Suspected Lower Respiratory Tract Infection (LRTI):  - LESS than or EQUAL to 0 25 ng/mL:   low likelihood for bacterial LRTI; antibiotics DISCOURAGED   - GREATER than 0 25 ng/mL:   increased likelihood for bacterial LRTI; antibiotics ENCOURAGED  Suspected Sepsis:  - Strongly consider initiating antibiotics in ALL UNSTABLE patients  - LESS than or EQUAL to 0 5 ng/mL:   low likelihood for bacterial sepsis; antibiotics DISCOURAGED   - GREATER than 0 5 ng/mL:   increased likelihood for bacterial sepsis; antibiotics ENCOURAGED   - GREATER than 2 ng/mL:   high risk for severe sepsis / septic shock; antibiotics strongly ENCOURAGED  Decisions on antibiotic use should not be based solely on Procalcitonin (PCT) levels   If PCT is low but uncertainty exists with stopping antibiotics, repeat PCT in 6-24 hours to confirm the low level  If antibiotics are administered (regardless if initial PCT was high or low), repeat PCT every 1-2 days to consider early antibiotic cessation (when GREATER than 80% decrease from the peak OR when PCT drops below designated cutoffs, whichever comes first), so long as the infection is NOT one that typically requires prolonged treatment durations (e g , bone/joint infections, endocarditis, Staph  aureus bacteremia)  Situations of FALSE-POSITIVE Procalcitonin values:  1) Newborns < 67 hours old  2) Massive stress from severe trauma / burns, major surgery, acute pancreatitis, cardiogenic / hemorrhagic shock, sickle cell crisis, or other organ perfusion abnormalities  3) Malaria and some Candidal infections  4) Treatment with agents that stimulate cytokines (e g , OKT3, anti-lymphocyte globulins, alemtuzumab, IL-2, granulocyte transfusion [NOT GCSFs])  5) Chronic renal disease causes elevated baseline levels (consider GREATER than 0 75 ng/mL as an abnormal cut-off); initiating HD/CRRT may cause transient decreases  6) Paraneoplastic syndromes from medullary thyroid or SCLC, some forms of vasculitis, and acute [id number] disease    Situations of FALSE-NEGATIVE Procalcitonin values:  1) Too early in clinical course for PCT to have reached its peak (may repeat in 6-24 hours to confirm low level)  2) Localized infection WITHOUT systemic (SIRS / sepsis) response (e g , an abscess, osteomyelitis, cystitis)  3) Mycobacteria (e g , Tuberculosis, MAC)  4) Cystic fibrosis exacerbations     URINALYSIS WITH REFLEX TO SCOPE - Abnormal    Color, UA Yellow   Final    Clarity, UA Clear   Final    Specific Gravity, UA 1 024  1 003 - 1 030 Final    pH, UA 6 0  4 5, 5 0, 5 5, 6 0, 6 5, 7 0, 7 5, 8 0 Final    Leukocytes, UA   (*) Negative Final    Value: Elevated glucose may cause decreased leukocyte values   See urine microscopic for [facility] result/    Nitrite, UA Negative  Negative Final    Protein,  (2+) (*) Negative mg/dl Final    Glucose, UA >=1000 (1%) (*) Negative mg/dl Final    Ketones, UA Trace (*) Negative mg/dl Final    Urobilinogen, UA 1 0  0 2, 1 0 E U /dl E U /dl Final    Bilirubin, UA Negative  Negative Final    Blood, UA Trace (*) Negative Final   URINE MICROSCOPIC - Abnormal    RBC, UA None Seen  None Seen, 2-4 /hpf Final    WBC, UA None Seen  None Seen, 2-4 /hpf Final    Epithelial Cells None Seen  None Seen, Occasional /hpf Final    Bacteria, UA None Seen  None Seen, Occasional /hpf Final    Fine granular casts 3-5  /lpf Final    COARSE GRANULAR CASTS 0-3  /lpf Final    AMORPH URATES Occasional  /hpf Final    MUCUS THREADS Occasional (*) None Seen Final   COVID19, INFLUENZA A/B, RSV PCR, [facility] - Normal    SARS-CoV-2 Negative  Negative Final    INFLUENZA A PCR Negative  Negative Final    INFLUENZA B PCR Negative  Negative Final    RSV PCR Negative  Negative Final    Narrative: This test has been authorized by FDA under an EUA (Emergency Use Assay) for use by authorized laboratories  Clinical caution and judgement should be used with the interpretation of these results with consideration of the clinical impression and other laboratory testing  Testing reported as "Positive" or "Negative" has been proven to be accurate according to standard laboratory validation requirements  All testing is performed with control materials showing appropriate reactivity at standard intervals  TROPONIN I - Normal    Troponin I <0 02  <=0 04 ng/mL Final    Comment: Siemens Chemistry analyzer 99% cutoff is > 0 04 ng/mL in network labs     o cTnI 99% cutoff is useful only when applied to patients in the clinical setting of myocardial ischemia   o cTnI 99% cutoff should be interpreted in the context of clinical history, ECG findings and possibly cardiac imaging to establish correct diagnosis     o cTnI 99% cutoff may be suggestive but clearly not indicative of a coronary event without the clinical setting of myocardial ischemia  APTT - Normal    PTT 37  23 - 37 seconds Final    Comment: Therapeutic Heparin Range =  60-90 seconds   LACTIC ACID 2 HOUR - Normal    LACTIC ACID 1 2  0 5 - 2 0 mmol/L Final    Narrative:     Result may be elevated if tourniquet was used during collection  PROCALCITONIN REFLEX   PLATELET COUNT     Time reflects when diagnosis was documented in both MDM as applicable and the Disposition within this note     Time User Action Codes Description Comment    2/16/2021  7:00 PM Linda Kraft Add [Q66 01] Congenital talipes equinovarus, right foot     2/16/2021  7:00 PM Linda Kraft Modify [Q66 01] Congenital talipes equinovarus, right foot     2/16/2021  7:05 PM Poonam Zaldivar Add [Z01 818] Preoperative clearance     2/16/2021  7:05 PM Poonam Zaldivar Add [E78 5] Hyperlipidemia, unspecified hyperlipidemia type     2/16/2021  7:05 PM Poonam Zaldivar Modify [E78 5] Hyperlipidemia, unspecified hyperlipidemia type     2/16/2021  7:05 PM Poonam Zaldivar Add [E11 40] Type 2 diabetes mellitus with diabetic neuropathy, unspecified whether long term insulin use ([facility] )     2/16/2021  7:05 PM Poonam Zaldivar Modify [E11 40] Type 2 diabetes mellitus with diabetic neuropathy, unspecified whether long term insulin use ([facility] )     2/16/2021  7:11 PM Poonam Zaldivar Add [A41 9] Sepsis without acute organ dysfunction, due to unspecified organism ([facility] )     2/16/2021  9:30 PM Virgil Guillen Add [A41 9] Sepsis ([facility] )     2/17/2021  4:34 PM Bailey Martin Modify [Q66 01] Congenital talipes equinovarus, right foot     2/18/2021  1:25 PM Zabrina Amos Modify [Q66 01] Congenital talipes equinovarus, right foot       ED Disposition     ED Disposition Condition Date/Time Comment    Admit Stable Tue Feb 16, 2021  9:30 PM Case was discussed with Podiatry and the patient's admission status was agreed to be Admission Status: inpatient status to the service of Dr Felix Uriostegui           Follow-up Information    None       Current Discharge Medication List      CONTINUE these medications which have NOT CHANGED    Details   Acetaminophen (TYLENOL ARTHRITIS PAIN PO) Take 650 mg by mouth 2 (two) times a day      atorvastatin (LIPITOR) 40 mg tablet Take 1 tablet (40 mg total) by mouth daily  Qty: 90 tablet, Refills: 3    Associated Diagnoses: Hyperlipidemia, unspecified hyperlipidemia type      Blood Glucose Monitoring Suppl (ACCU-CHEK JYOTI SMARTVIEW) w/Device KIT use to check Blood Sugar as needed      diltiazem (CARDIZEM) 120 MG tablet Take 1 tablet (120 mg total) by mouth 2 (two) times a day  Qty: 180 tablet, Refills: 6    Associated Diagnoses: Atrial fibrillation with RVR ([address] );  Type 2 diabetes mellitus with diabetic neuropathy, unspecified whether long term insulin use (HCC)      Eliquis 5 MG Take 1 tablet (5 mg total) by mouth 2 (two) times a day  Qty: 60 tablet, Refills: 2    Associated Diagnoses: Atrial fibrillation, unspecified type (HCC)      glimepiride (AMARYL) 4 mg tablet Take one tablet by mouth twice daily  Qty: 180 tablet, Refills: 3    Associated Diagnoses: Type 2 diabetes mellitus without complication, without long-term current use of insulin (HCC)      glucose blood (Accu-Chek SmartView) test strip Use as instructed BID  Qty: 200 each, Refills: 1    Associated Diagnoses: Type 2 diabetes mellitus without complication, unspecified whether long term insulin use (HCC)      Januvia 100 MG tablet Take 1 tablet (100 mg total) by mouth daily  Qty: 132 tablet, Refills: 3    Associated Diagnoses: Type 2 diabetes mellitus with diabetic neuropathy, unspecified whether long term insulin use (HCC)      lisinopril (ZESTRIL) 40 mg tablet Take 1 tablet (40 mg total) by mouth daily  Qty: 90 tablet, Refills: 3    Associated Diagnoses: Essential hypertension      metFORMIN (GLUCOPHAGE-XR) 750 mg 24 hr tablet Take 1 tablet (750 mg total) by mouth 2 (two) times a day ONE BID  Qty: 180 tablet, Refills: 3 Associated Diagnoses: Type 2 diabetes mellitus without complication, unspecified whether long term insulin use (HCC)      oxyCODONE-acetaminophen (PERCOCET) 5-325 mg per tablet Take 1 tablet by mouth every 6 (six) hours as needed for moderate pain for up to 20 dosesMax Daily Amount: 4 tablets  Qty: 20 tablet, Refills: 0    Associated Diagnoses: Post-operative state           No discharge procedures on file  Prior to Admission Medications   Prescriptions Last Dose Informant Patient Reported? Taking? Acetaminophen (TYLENOL ARTHRITIS PAIN PO)  Self Yes No   Sig: Take 650 mg by mouth 2 (two) times a day   Blood Glucose Monitoring Suppl (ACCU-CHEK JYOTI SMARTVIEW) w/Device KIT  Self Yes No   Sig: use to check Blood Sugar as needed   Eliquis 5 MG   No No   Sig: Take 1 tablet (5 mg total) by mouth 2 (two) times a day   Januvia 100 MG tablet   No No   Sig: Take 1 tablet (100 mg total) by mouth daily   atorvastatin (LIPITOR) 40 mg tablet   No No   Sig: Take 1 tablet (40 mg total) by mouth daily   diltiazem (CARDIZEM) 120 MG tablet   No No   Sig: Take 1 tablet (120 mg total) by mouth 2 (two) times a day   glimepiride (AMARYL) 4 mg tablet   No No   Sig: Take one tablet by mouth twice daily   Patient taking differently: Take 4 mg by mouth 2 (two) times a day Take one tablet by mouth twice daily   glucose blood (Accu-Chek SmartView) test strip   No No   Sig: Use as instructed BID   lisinopril (ZESTRIL) 40 mg tablet   No No   Sig: Take 1 tablet (40 mg total) by mouth daily   metFORMIN (GLUCOPHAGE-XR) 750 mg 24 hr tablet   No No   Sig: Take 1 tablet (750 mg total) by mouth 2 (two) times a day ONE BID   oxyCODONE-acetaminophen (PERCOCET) 5-325 mg per tablet   No No   Sig: Take 1 tablet by mouth every 6 (six) hours as needed for moderate pain for up to 20 dosesMax Daily Amount: 4 tablets      Facility-Administered Medications: None       Portions of the record may have been created with voice recognition software   Occasional wrong word or "sound a like" substitutions may have occurred due to the inherent limitations of voice recognition software  Read the chart carefully and recognize, using context, where substitutions have occurred      Electronically signed by:  Oumou Gama

## 2021-02-16 NOTE — SEPSIS NOTE
Sepsis Note   Tracey Tejeda  78 y o  male MRN: [id number]  Unit/Bed#: ED 26 Encounter: [id number]      qSOFA     Row Name 02/16/21 1730 02/16/21 1700 02/16/21 1637          Altered mental status GCS < 15  --  --  --      Respiratory Rate > / =22  0  0  1      Systolic BP < / =509  0  0  0      Q Sofa Score  0  0  1          Initial Sepsis Screening     Row Name 02/16/21 1754                Is the patient's history suggestive of a new or worsening infection? (!) Yes (Proceed)  -DH        Suspected source of infection  soft tissue  -DH        Are two or more of the following signs & symptoms of infection both present and new to the patient? (!) Yes (Proceed)  -DH        Indicate SIRS criteria  Tachycardia > 90 bpm;Leukocytosis (WBC > 19500 IJL); Hyperthemia > 38 3C (100 9F)  -DH        If the answer is yes to both questions, suspicion of sepsis is present  --        If severe sepsis is present AND tissue hypoperfusion perists in the hour after fluid resuscitation or lactate > 4, the patient meets criteria for SEPTIC SHOCK  --        Are any of the following organ dysfunction criteria present within 6 hours of suspected infection and SIRS criteria that are NOT considered to be chronic conditions? (!) Yes  -DH        Organ dysfunction  Lactate > 2 0 mmol/L  -DH        Date of presentation of severe sepsis  02/16/21  -        Time of presentation of severe sepsis  1755  -        Tissue hypoperfusion persists in the hour after crystalloid fluid administration, evidenced, by either:  --        Was hypotension present within one hour of the conclusion of crystalloid fluid administration?   No  -DH        Date of presentation of septic shock  --        Time of presentation of septic shock  --          User Key  (r) = Recorded By, (t) = Taken By, (c) = Cosigned By    [address] Name Provider Type    Johnson  34, DO Physician

## 2021-02-16 NOTE — ED PROVIDER NOTES
History  Chief Complaint   Patient presents with    Altered Mental Status     Per EMS pt had recent surgery on his right foot  Today pt had sudden onset of AMS and fevers and was diaphoretic and shaking  77-year-old male presents to the ED 4 days after surgery on his right foot with Podiatry  Reportedly having altered mental status at home with a fever to 102  EMS called  Patient found have a fever of 102 given Tylenol 650 mg  Patient not complaining of any pain or any other discomfort at this time  Prior to Admission Medications   Prescriptions Last Dose Informant Patient Reported? Taking?    Acetaminophen (TYLENOL ARTHRITIS PAIN PO)  Self Yes No   Sig: Take 650 mg by mouth 2 (two) times a day   Blood Glucose Monitoring Suppl (ACCU-CHEK JYOTI SMARTVIEW) w/Device KIT  Self Yes No   Sig: use to check Blood Sugar as needed   Eliquis 5 MG   No No   Sig: Take 1 tablet (5 mg total) by mouth 2 (two) times a day   Januvia 100 MG tablet   No No   Sig: Take 1 tablet (100 mg total) by mouth daily   atorvastatin (LIPITOR) 40 mg tablet   No No   Sig: Take 1 tablet (40 mg total) by mouth daily   diltiazem (CARDIZEM) 120 MG tablet   No No   Sig: Take 1 tablet (120 mg total) by mouth 2 (two) times a day   glimepiride (AMARYL) 4 mg tablet   No No   Sig: Take one tablet by mouth twice daily   Patient taking differently: Take 4 mg by mouth 2 (two) times a day Take one tablet by mouth twice daily   glucose blood (Accu-Chek SmartView) test strip   No No   Sig: Use as instructed BID   lisinopril (ZESTRIL) 40 mg tablet   No No   Sig: Take 1 tablet (40 mg total) by mouth daily   metFORMIN (GLUCOPHAGE-XR) 750 mg 24 hr tablet   No No   Sig: Take 1 tablet (750 mg total) by mouth 2 (two) times a day ONE BID   oxyCODONE-acetaminophen (PERCOCET) 5-325 mg per tablet   No No   Sig: Take 1 tablet by mouth every 6 (six) hours as needed for moderate pain for up to 20 dosesMax Daily Amount: 4 tablets      Facility-Administered Medications: None       Past Medical History:   Diagnosis Date    Arthritis     Atrial fibrillation ([facility] )     Diagnosed 11/2018    Benign prostate hyperplasia 04/2002    Cellulitis     right lower leg     Colon polyp 2006    Diabetes mellitus ([facility] )     Hearing aid worn     bilat    Hearing loss     90% loss left ear and 40% right ear    History of clubfoot     "since birth"    History of pneumonia     History of TIA (transient ischemic attack) 04/25/2017 11/30/18 pt denies    Hyperlipidemia 5/15/2014    Hypertension     Infectious viral hepatitis     "cant remember type"    Irregular heart beat     Neuropathy     both feet    Osteomyelitis ([facility] )     right great toe    Right middle lobe pulmonary nodule 11/6/2018    Seasickness     Teeth missing     Type 2 diabetes mellitus with diabetic neuropathy ([facility] ) 11/6/2018    Wears glasses     reading    Wound, open     bottom of right foot       Past Surgical History:   Procedure Laterality Date    BUNIONECTOMY Right 12/4/2018    Procedure: 5TH METATARSAL BONE PARTIAL RESECTION, FULL THICKNESS DEBRIDEMENT OF DIABETIC ULCER;  Surgeon: Lamonte Holter, DPM;  [facility];  Service: Podiatry    CLUB FOOT RELEASE Bilateral     COLONOSCOPY      EXTERNAL FIXATOR APPLICATION Right 0/57/6152    Procedure: Application multiplane external fixation;  Surgeon: Britney Pacheco DPM;  [facility];  Service: Podiatry    RI FUSION FOOT BONES,MIDTARSAL,OSTEOTMY Right 2/12/2021    Procedure: foot ARTHRODESIS/FUSION;  Surgeon: Britney Pacheco DPM;  [facility];  Service: Podiatry    RI LENGTH/SHORT LEG/ANKL TENDON,SINGLE Right 2/12/2021    Procedure: LENGTHEN TIBIAL TENDON, TRANS HEEL CORD;  Surgeon: Britney Pacheco DPM;  [facility];  Service: Podiatry    RI PART [facility] Right 12/29/2020    Procedure: EXCISION EXOSTOSIS;  Surgeon: Britney Pacheco DPM;  [facility];  Service: Podiatry    RI [facility]D CLOS SURG WND EXTEN/COMPLIC Right     Procedure: PRIMARY DELAYED CLOSURE;  Surgeon: La Lambert DPM;  [facility];  Service: Podiatry    TOE AMPUTATION Right     partial great toe    VASECTOMY         Family History   Problem Relation Age of Onset    Diabetes Father         mellitus     I have reviewed and agree with the history as documented  E-Cigarette/Vaping    E-Cigarette Use Never User      E-Cigarette/Vaping Substances    Nicotine No     THC No     CBD No      Social History     Tobacco Use    Smoking status: Former Smoker     Types: Cigarettes     Quit date: 1988     Years since quittin 2    Smokeless tobacco: Former User   Substance Use Topics    Alcohol use: Yes     Frequency: 2-4 times a month     Drinks per session: 1 or 2     Comment: socially    Drug use: No        Review of Systems   Constitutional: Positive for chills, diaphoresis and fever  Skin: Positive for wound (Surgical wound of the right foot  )  Neurological: Positive for weakness  All other systems reviewed and are negative  Physical Exam  ED Triage Vitals [21 1637]   Temperature Pulse Respirations Blood Pressure SpO2   (!) 103 °F (39 4 °C) 84 (!) 26 136/81 97 %      Temp Source Heart Rate Source Patient Position - Orthostatic VS BP Location FiO2 (%)   Oral Monitor Lying Left arm --      Pain Score       3             Orthostatic Vital Signs  Vitals:    21 [id number]   BP: 125/62 125/62 134/66 129/77   Pulse: (!) 128 (!) 115 (!) 128 (!) 120   Patient Position - Orthostatic VS: Sitting Lying Sitting Lying       Physical Exam  Vitals signs and nursing note reviewed  Constitutional:       General: He is not in acute distress  Appearance: He is well-developed  He is not diaphoretic  HENT:      Head: Normocephalic and atraumatic        Right Ear: External ear normal       Left Ear: External ear normal       Nose: Nose normal  Eyes:      General: No scleral icterus  Right eye: No discharge  Left eye: No discharge  Conjunctiva/sclera: Conjunctivae normal    Neck:      Musculoskeletal: Normal range of motion and neck supple  Cardiovascular:      Rate and Rhythm: Normal rate and regular rhythm  Heart sounds: Normal heart sounds  No murmur  No friction rub  No gallop  Pulmonary:      Effort: Pulmonary effort is normal  No respiratory distress  Breath sounds: Normal breath sounds  No wheezing or rales  Abdominal:      General: Bowel sounds are normal  There is no distension  Palpations: Abdomen is soft  There is no mass  Tenderness: There is no abdominal tenderness  There is no guarding  Musculoskeletal: Normal range of motion  General: No tenderness or deformity  Feet:      Comments: Surgical device noted covering the right foot with surgical bandaging in place  There is blood-soaked gauze  Skin:     General: Skin is warm and dry  Coloration: Skin is not pale  Findings: No erythema or rash  Neurological:      Mental Status: He is alert and oriented to person, place, and time  Psychiatric:         Behavior: Behavior normal          Thought Content:  Thought content normal          Judgment: Judgment normal          ED Medications  Medications   chlorhexidine (PERIDEX) 0 12 % oral rinse 15 mL (0 mL Swish & Spit Hold 2/1952)   atorvastatin (LIPITOR) tablet 40 mg (has no administration in time range)   lisinopril (ZESTRIL) tablet 40 mg (has no administration in time range)   diltiazem (CARDIZEM) tablet 120 mg (120 mg Oral Given 2/16/21 1956)   apixaban (ELIQUIS) tablet 5 mg (5 mg Oral Given 2/16/21 1956)   insulin lispro (HumaLOG) 100 units/mL subcutaneous injection 1-5 Units (has no administration in time range)   vancomycin (VANCOCIN) 2,000 mg in sodium chloride 0 9 % 500 mL IVPB (has no administration in time range)     And   cefepime (MAXIPIME) 2 g/50 mL dextrose IVPB (has no administration in time range)   metroNIDAZOLE (FLAGYL) tablet 500 mg (500 mg Oral Given 2/16/21 1951)   polyethylene glycol (MIRALAX) packet 17 g (has no administration in time range)   senna (SENOKOT) tablet 8 6 mg (has no administration in time range)   ondansetron (ZOFRAN) injection 4 mg (has no administration in time range)   acetaminophen (TYLENOL) tablet 975 mg (has no administration in time range)   oxyCODONE (ROXICODONE) IR tablet 5 mg (has no administration in time range)   HYDROmorphone (DILAUDID) injection 0 5 mg (has no administration in time range)   oxyCODONE (ROXICODONE) immediate release tablet 10 mg (10 mg Oral Given 2/16/21 1951)   insulin lispro (HumaLOG) 100 units/mL subcutaneous injection 1-6 Units (has no administration in time range)   sodium chloride 0 9 % infusion (75 mL/hr Intravenous New Bag 2/16/21 2056)   acetaminophen (FOR EMS ONLY) (TYLENOL) oral suspension 650 mg (0 mg Does not apply Given to EMS 2/16/21 1647)   ondansetron (FOR EMS ONLY) (ZOFRAN) 4 mg/2 mL injection 4 mg (0 mg Does not apply Given to EMS 2/16/21 1647)   sodium chloride 0 9 % bolus 1,000 mL (0 mL Intravenous Stopped 2/16/21 1855)   vancomycin (VANCOCIN) 2,000 mg in sodium chloride 0 9 % 500 mL IVPB (0 mg Intravenous Stopped 2/16/21 1919)   cefepime (MAXIPIME) 2 g/50 mL dextrose IVPB (0 mg Intravenous Stopped 2/16/21 1726)   metroNIDAZOLE (FLAGYL) IVPB (premix) 500 mg 100 mL (0 mg Intravenous Stopped 2/16/21 1816)       Diagnostic Studies  Results Reviewed     Procedure Component Value Units Date/Time    Blood culture #1 [[id number]] Collected: 02/16/21 1630    Lab Status: Preliminary result Specimen: Blood from Arm, Left Updated: 02/16/21 2001     Blood Culture Received in Microbiology Lab  Culture in Progress      Blood culture #2 [[id number]] Collected: 02/16/21 1648    Lab Status: Preliminary result Specimen: Blood from Arm, Right Updated: 02/16/21 2001     Blood Culture Received in Microbiology Lab  Culture in Progress  Platelet count [[id number]]     Lab Status: No result Specimen: Blood     Urine Microscopic [[id number]]  (Abnormal) Collected: 02/16/21 1743    Lab Status: Final result Specimen: Urine, Clean Catch Updated: 02/16/21 1946     RBC, UA None Seen /hpf      WBC, UA None Seen /hpf      Epithelial Cells None Seen /hpf      Bacteria, UA None Seen /hpf      Fine granular casts 3-5 /lpf      COARSE GRANULAR CASTS 0-3 /lpf      AMORPH URATES Occasional /hpf      MUCUS THREADS Occasional    Lactic acid 2 Hours [[id number]]  (Normal) Collected: 02/16/21 1855    Lab Status: Final result Specimen: Blood from Arm, Right Updated: 02/16/21 1937     LACTIC ACID 1 2 mmol/L     Narrative:      Result may be elevated if tourniquet was used during collection  UA (URINE) with reflex to Scope [[id number]]  (Abnormal) Collected: 02/16/21 1743    Lab Status: Final result Specimen: Urine, Clean Catch Updated: 02/16/21 1815     Color, UA Yellow     Clarity, UA Clear     Specific Gravity, UA 1 024     pH, UA 6 0     Leukocytes, UA Elevated glucose may cause decreased leukocyte values  See urine microscopic for [facility] result/     Nitrite, UA Negative     Protein,  (2+) mg/dl      Glucose, UA >=1000 (1%) mg/dl      Ketones, UA Trace mg/dl      Urobilinogen, UA 1 0 E U /dl      Bilirubin, UA Negative     Blood, UA Trace    COVID19, Influenza A/B, RSV PCR, [facility] [[id number]]  (Normal) Collected: 02/16/21 1703    Lab Status: Final result Specimen: Nares from Nose Updated: 02/16/21 1809     SARS-CoV-2 Negative     INFLUENZA A PCR Negative     INFLUENZA B PCR Negative     RSV PCR Negative    Narrative: This test has been authorized by FDA under an EUA (Emergency Use Assay) for use by authorized laboratories  Clinical caution and judgement should be used with the interpretation of these results with consideration of the clinical impression and other laboratory testing    Testing reported as "Positive" or "Negative" has been proven to be accurate according to standard laboratory validation requirements  All testing is performed with control materials showing appropriate reactivity at standard intervals  Procalcitonin with AM Reflex [[id number]]  (Abnormal) Collected: 02/16/21 1648    Lab Status: Final result Specimen: Blood from Arm, Right Updated: 02/16/21 1802     Procalcitonin 1 08 ng/ml     Procalcitonin Reflex [[id number]]     Lab Status: No result Specimen: Blood     Lactic acid, plasma [[id number]]  (Abnormal) Collected: 02/16/21 1648    Lab Status: Final result Specimen: Blood from Arm, Right Updated: 02/16/21 1747     LACTIC ACID 2 1 mmol/L     Narrative:      Result may be elevated if tourniquet was used during collection      Protime-INR [[id number]]  (Abnormal) Collected: 02/16/21 1648    Lab Status: Final result Specimen: Blood from Arm, Right Updated: 02/16/21 1734     Protime 17 9 seconds      INR 1 48    APTT [[id number]]  (Normal) Collected: 02/16/21 1648    Lab Status: Final result Specimen: Blood from Arm, Right Updated: 02/16/21 1734     PTT 37 seconds     Troponin I [[id number]]  (Normal) Collected: 02/16/21 1648    Lab Status: Final result Specimen: Blood from Arm, Right Updated: 02/16/21 1730     Troponin I <0 02 ng/mL     Comprehensive metabolic panel [[id number]]  (Abnormal) Collected: 02/16/21 1648    Lab Status: Final result Specimen: Blood from Arm, Right Updated: 02/16/21 1728     Sodium 137 mmol/L      Potassium 3 7 mmol/L      Chloride 106 mmol/L      CO2 22 mmol/L      ANION GAP 9 mmol/L      BUN 12 mg/dL      Creatinine 1 05 mg/dL      Glucose 175 mg/dL      Calcium 8 8 mg/dL      Corrected Calcium 9 7 mg/dL      AST 52 U/L      ALT 61 U/L      Alkaline Phosphatase 120 U/L      Total Protein 7 1 g/dL      Albumin 2 9 g/dL      Total Bilirubin 0 77 mg/dL      eGFR 73 ml/min/1 73sq m     Narrative:      [place] guidelines for Chronic Kidney Disease (CKD):     Stage 1 with normal or high GFR (GFR > 90 mL/min/1 73 square meters)    Stage 2 Mild CKD (GFR = 60-89 mL/min/1 73 square meters)    Stage 3A Moderate CKD (GFR = 45-59 mL/min/1 73 square meters)    Stage 3B Moderate CKD (GFR = 30-44 mL/min/1 73 square meters)    Stage 4 Severe CKD (GFR = 15-29 mL/min/1 73 square meters)    Stage 5 End Stage CKD (GFR <15 mL/min/1 73 square meters)  Note: GFR calculation is accurate only with a steady state creatinine    CBC and differential [[id number]]  (Abnormal) Collected: 02/16/21 1648    Lab Status: Final result Specimen: Blood from Arm, Right Updated: 02/16/21 1702     WBC 14 65 Thousand/uL      RBC 3 96 Million/uL      Hemoglobin 12 3 g/dL      Hematocrit 37 3 %      MCV 94 fL      MCH 31 1 pg      MCHC 33 0 g/dL      RDW 13 8 %      MPV 11 3 fL      Platelets 503 Thousands/uL      nRBC 0 /100 WBCs      Neutrophils Relative 88 %      Immat GRANS % 1 %      Lymphocytes Relative 5 %      Monocytes Relative 6 %      Eosinophils Relative 0 %      Basophils Relative 0 %      Neutrophils Absolute 12 97 Thousands/µL      Immature Grans Absolute 0 07 Thousand/uL      Lymphocytes Absolute 0 76 Thousands/µL      Monocytes Absolute 0 82 Thousand/µL      Eosinophils Absolute 0 01 Thousand/µL      Basophils Absolute 0 02 Thousands/µL                  XR foot 2 vw right    (Results Pending)         Procedures  ECG 12 Lead Documentation Only    Date/Time: 2/16/2021 5:44 PM  Performed by: Solomon Tavera DO  Authorized by: Solomon Tavera DO     Indications / Diagnosis:  Sepsis  Patient location:  ED  Previous ECG:     Previous ECG:  Compared to current  Interpretation:     Interpretation: abnormal    Rate:     ECG rate:  120    ECG rate assessment: tachycardic    Rhythm:     Rhythm: atrial fibrillation    Ectopy:     Ectopy: none    QRS:     QRS axis:  Normal    QRS intervals:  Normal  Conduction:     Conduction: normal    ST segments:     ST segments:  Normal  T waves:     T waves: normal ED Course  ED Course as of Feb 16 2130   Tue Feb 16, 2021   1641 Tylenol given with EMS      1754 LACTIC ACID(!!): 2 1                         Initial Sepsis Screening     [address] Name 02/16/21 1754                Is the patient's history suggestive of a new or worsening infection? (!) Yes (Proceed)  -DH        Suspected source of infection  soft tissue  -DH        Are two or more of the following signs & symptoms of infection both present and new to the patient? (!) Yes (Proceed)  -DH        Indicate SIRS criteria  Tachycardia > 90 bpm;Leukocytosis (WBC > 36012 IJL); Hyperthemia > 38 3C (100 9F)  -DH        If the answer is yes to both questions, suspicion of sepsis is present  --        If severe sepsis is present AND tissue hypoperfusion perists in the hour after fluid resuscitation or lactate > 4, the patient meets criteria for SEPTIC SHOCK  --        Are any of the following organ dysfunction criteria present within 6 hours of suspected infection and SIRS criteria that are NOT considered to be chronic conditions? (!) Yes  -DH        Organ dysfunction  Lactate > 2 0 mmol/L  -DH        Date of presentation of severe sepsis  02/16/21  -DH        Time of presentation of severe sepsis  1755  -DH        Tissue hypoperfusion persists in the hour after crystalloid fluid administration, evidenced, by either:  --        Was hypotension present within one hour of the conclusion of crystalloid fluid administration? No  -DH        Date of presentation of septic shock  --        Time of presentation of septic shock  --          User Key  (r) = Recorded By, (t) = Taken By, (c) = Cosigned By    [address] Name Provider Type    Johnson  34, DO Physician                    MDM  Number of Diagnoses or Management Options  Diagnosis management comments: Discussion had with podiatry  Evaluating the ED  Will admit to medicine for sepsis        Disposition  Final diagnoses:   Sepsis ([address] )     Time reflects when diagnosis was documented in both MDM as applicable and the Disposition within this note     Time User Action Codes Description Comment    2/16/2021  7:00 PM Harvy Balo Add [Q66 01] Congenital talipes equinovarus, right foot     2/16/2021  7:00 PM Harvy Balo Modify [Q66 01] Congenital talipes equinovarus, right foot     2/16/2021  7:05 PM Kelly Cervantes Add [Z01 818] Preoperative clearance     2/16/2021  7:05 PM Kelly Cervantes Add [E78 5] Hyperlipidemia, unspecified hyperlipidemia type     2/16/2021  7:05 PM Kelly Cervantes Modify [E78 5] Hyperlipidemia, unspecified hyperlipidemia type     2/16/2021  7:05 PM Kelly Cervantes Add [E11 40] Type 2 diabetes mellitus with diabetic neuropathy, unspecified whether long term insulin use ([facility] [address] )     2/16/2021  7:05 PM Kelly Cervantes Modify [E11 40] Type 2 diabetes mellitus with diabetic neuropathy, unspecified whether long term insulin use ([facility] [address] )     2/16/2021  7:11 PM Kelly Cervantes Add [A41 9] Sepsis without acute organ dysfunction, due to unspecified organism ([facility] [address] )     2/16/2021  9:30 PM Sondra Larose Add [A41 9] Sepsis [facility])       ED Disposition     ED Disposition Condition Date/Time Comment    Admit Stable Tue Feb 16, 2021  9:30 PM Case was discussed with Podiatry and the patient's admission status was agreed to be Admission Status: inpatient status to the service of Dr Estelle Schneider   Follow-up Information    None         Patient's Medications   Discharge Prescriptions    No medications on file     No discharge procedures on file  PDMP Review     None           ED Provider  Attending physically available and evaluated Franchesca Moreno I managed the patient along with the ED Attending      Electronically Signed by         Summer Kenney DO  02/16/21 7974

## 2021-02-17 ENCOUNTER — ANESTHESIA (INPATIENT)
Dept: PERIOP | Facility: HOSPITAL | Age: 71
DRG: 856 | End: 2021-02-17
Payer: COMMERCIAL

## 2021-02-17 ENCOUNTER — APPOINTMENT (INPATIENT)
Dept: RADIOLOGY | Facility: HOSPITAL | Age: 71
DRG: 856 | End: 2021-02-17
Payer: COMMERCIAL

## 2021-02-17 ENCOUNTER — ANESTHESIA EVENT (INPATIENT)
Dept: PERIOP | Facility: HOSPITAL | Age: 71
DRG: 856 | End: 2021-02-17
Payer: COMMERCIAL

## 2021-02-17 VITALS — HEART RATE: 103 BPM

## 2021-02-17 LAB
ANION GAP SERPL CALCULATED.3IONS-SCNC: 6 MMOL/L (ref 4–13)
ATRIAL RATE: 147 BPM
BUN SERPL-MCNC: 10 MG/DL (ref 5–25)
CALCIUM SERPL-MCNC: 8.9 MG/DL (ref 8.3–10.1)
CHLORIDE SERPL-SCNC: 111 MMOL/L (ref 100–108)
CO2 SERPL-SCNC: 22 MMOL/L (ref 21–32)
CREAT SERPL-MCNC: 1.02 MG/DL (ref 0.6–1.3)
ERYTHROCYTE [DISTWIDTH] IN BLOOD BY AUTOMATED COUNT: 14.3 % (ref 11.6–15.1)
GFR SERPL CREATININE-BSD FRML MDRD: 75 ML/MIN/1.73SQ M
GLUCOSE SERPL-MCNC: 137 MG/DL (ref 65–140)
GLUCOSE SERPL-MCNC: 144 MG/DL (ref 65–140)
GLUCOSE SERPL-MCNC: 146 MG/DL (ref 65–140)
GLUCOSE SERPL-MCNC: 146 MG/DL (ref 65–140)
GLUCOSE SERPL-MCNC: 153 MG/DL (ref 65–140)
HCT VFR BLD AUTO: 34.6 % (ref 36.5–49.3)
HGB BLD-MCNC: 11.1 G/DL (ref 12–17)
INR PPP: 1.47 (ref 0.84–1.19)
MCH RBC QN AUTO: 31.1 PG (ref 26.8–34.3)
MCHC RBC AUTO-ENTMCNC: 32.1 G/DL (ref 31.4–37.4)
MCV RBC AUTO: 97 FL (ref 82–98)
P AXIS: 0 DEGREES
PLATELET # BLD AUTO: 178 THOUSANDS/UL (ref 149–390)
PMV BLD AUTO: 10.5 FL (ref 8.9–12.7)
POTASSIUM SERPL-SCNC: 3.9 MMOL/L (ref 3.5–5.3)
PROTHROMBIN TIME: 17.8 SECONDS (ref 11.6–14.5)
QRS AXIS: 59 DEGREES
QRSD INTERVAL: 112 MS
QT INTERVAL: 296 MS
QTC INTERVAL: 418 MS
RBC # BLD AUTO: 3.57 MILLION/UL (ref 3.88–5.62)
SODIUM SERPL-SCNC: 139 MMOL/L (ref 136–145)
T WAVE AXIS: 40 DEGREES
VENTRICULAR RATE: 120 BPM
WBC # BLD AUTO: 8.58 THOUSAND/UL (ref 4.31–10.16)

## 2021-02-17 PROCEDURE — 20694 RMVL EXT FIXJ SYS UNDER ANES: CPT | Performed by: PODIATRIST

## 2021-02-17 PROCEDURE — 0QPN35Z REMOVAL OF EXTERNAL FIXATION DEVICE FROM RIGHT METATARSAL, PERCUTANEOUS APPROACH: ICD-10-PCS | Performed by: PODIATRIST

## 2021-02-17 PROCEDURE — 93010 ELECTROCARDIOGRAM REPORT: CPT | Performed by: INTERNAL MEDICINE

## 2021-02-17 PROCEDURE — 73630 X-RAY EXAM OF FOOT: CPT

## 2021-02-17 PROCEDURE — 99024 POSTOP FOLLOW-UP VISIT: CPT | Performed by: PODIATRIST

## 2021-02-17 PROCEDURE — 85027 COMPLETE CBC AUTOMATED: CPT | Performed by: PODIATRIST

## 2021-02-17 PROCEDURE — 82948 REAGENT STRIP/BLOOD GLUCOSE: CPT

## 2021-02-17 PROCEDURE — 87077 CULTURE AEROBIC IDENTIFY: CPT | Performed by: PODIATRIST

## 2021-02-17 PROCEDURE — 87205 SMEAR GRAM STAIN: CPT | Performed by: PODIATRIST

## 2021-02-17 PROCEDURE — 80048 BASIC METABOLIC PNL TOTAL CA: CPT | Performed by: PODIATRIST

## 2021-02-17 PROCEDURE — 85610 PROTHROMBIN TIME: CPT | Performed by: PODIATRIST

## 2021-02-17 PROCEDURE — 87075 CULTR BACTERIA EXCEPT BLOOD: CPT | Performed by: PODIATRIST

## 2021-02-17 PROCEDURE — 0Y9M3ZZ DRAINAGE OF RIGHT FOOT, PERCUTANEOUS APPROACH: ICD-10-PCS | Performed by: PODIATRIST

## 2021-02-17 PROCEDURE — 99223 1ST HOSP IP/OBS HIGH 75: CPT | Performed by: INTERNAL MEDICINE

## 2021-02-17 PROCEDURE — 87070 CULTURE OTHR SPECIMN AEROBIC: CPT | Performed by: PODIATRIST

## 2021-02-17 PROCEDURE — 93005 ELECTROCARDIOGRAM TRACING: CPT

## 2021-02-17 PROCEDURE — 28003 TREATMENT OF FOOT INFECTION: CPT | Performed by: PODIATRIST

## 2021-02-17 PROCEDURE — 87186 SC STD MICRODIL/AGAR DIL: CPT | Performed by: PODIATRIST

## 2021-02-17 RX ORDER — LISINOPRIL 20 MG/1
40 TABLET ORAL DAILY
Status: DISCONTINUED | OUTPATIENT
Start: 2021-02-18 | End: 2021-03-10 | Stop reason: HOSPADM

## 2021-02-17 RX ORDER — ONDANSETRON 2 MG/ML
4 INJECTION INTRAMUSCULAR; INTRAVENOUS ONCE AS NEEDED
Status: DISCONTINUED | OUTPATIENT
Start: 2021-02-17 | End: 2021-02-17 | Stop reason: HOSPADM

## 2021-02-17 RX ORDER — FENTANYL CITRATE/PF 50 MCG/ML
25 SYRINGE (ML) INJECTION
Status: DISCONTINUED | OUTPATIENT
Start: 2021-02-17 | End: 2021-02-17 | Stop reason: HOSPADM

## 2021-02-17 RX ORDER — MAGNESIUM HYDROXIDE 1200 MG/15ML
LIQUID ORAL AS NEEDED
Status: DISCONTINUED | OUTPATIENT
Start: 2021-02-17 | End: 2021-02-17 | Stop reason: HOSPADM

## 2021-02-17 RX ORDER — METOPROLOL TARTRATE 5 MG/5ML
INJECTION INTRAVENOUS AS NEEDED
Status: DISCONTINUED | OUTPATIENT
Start: 2021-02-17 | End: 2021-02-17

## 2021-02-17 RX ORDER — HYDROMORPHONE HCL/PF 1 MG/ML
0.2 SYRINGE (ML) INJECTION
Status: DISCONTINUED | OUTPATIENT
Start: 2021-02-17 | End: 2021-02-17 | Stop reason: HOSPADM

## 2021-02-17 RX ORDER — SODIUM CHLORIDE, SODIUM LACTATE, POTASSIUM CHLORIDE, CALCIUM CHLORIDE 600; 310; 30; 20 MG/100ML; MG/100ML; MG/100ML; MG/100ML
INJECTION, SOLUTION INTRAVENOUS CONTINUOUS PRN
Status: DISCONTINUED | OUTPATIENT
Start: 2021-02-17 | End: 2021-02-17

## 2021-02-17 RX ORDER — MIDAZOLAM HYDROCHLORIDE 2 MG/2ML
INJECTION, SOLUTION INTRAMUSCULAR; INTRAVENOUS AS NEEDED
Status: DISCONTINUED | OUTPATIENT
Start: 2021-02-17 | End: 2021-02-17

## 2021-02-17 RX ORDER — SODIUM CHLORIDE 9 MG/ML
INJECTION, SOLUTION INTRAVENOUS CONTINUOUS PRN
Status: DISCONTINUED | OUTPATIENT
Start: 2021-02-17 | End: 2021-02-17

## 2021-02-17 RX ORDER — FENTANYL CITRATE 50 UG/ML
INJECTION, SOLUTION INTRAMUSCULAR; INTRAVENOUS AS NEEDED
Status: DISCONTINUED | OUTPATIENT
Start: 2021-02-17 | End: 2021-02-17

## 2021-02-17 RX ORDER — ACETAMINOPHEN 325 MG/1
650 TABLET ORAL EVERY 6 HOURS PRN
Status: DISCONTINUED | OUTPATIENT
Start: 2021-02-17 | End: 2021-03-10 | Stop reason: HOSPADM

## 2021-02-17 RX ORDER — BUPIVACAINE HYDROCHLORIDE 2.5 MG/ML
INJECTION, SOLUTION EPIDURAL; INFILTRATION; INTRACAUDAL AS NEEDED
Status: DISCONTINUED | OUTPATIENT
Start: 2021-02-17 | End: 2021-02-17 | Stop reason: HOSPADM

## 2021-02-17 RX ORDER — PROPOFOL 10 MG/ML
INJECTION, EMULSION INTRAVENOUS CONTINUOUS PRN
Status: DISCONTINUED | OUTPATIENT
Start: 2021-02-17 | End: 2021-02-17

## 2021-02-17 RX ORDER — METRONIDAZOLE 500 MG/1
500 TABLET ORAL EVERY 8 HOURS SCHEDULED
Status: DISCONTINUED | OUTPATIENT
Start: 2021-02-17 | End: 2021-02-19

## 2021-02-17 RX ORDER — MIDAZOLAM HYDROCHLORIDE 2 MG/2ML
INJECTION, SOLUTION INTRAMUSCULAR; INTRAVENOUS
Status: COMPLETED
Start: 2021-02-17 | End: 2021-02-17

## 2021-02-17 RX ORDER — FENTANYL CITRATE 50 UG/ML
INJECTION, SOLUTION INTRAMUSCULAR; INTRAVENOUS
Status: COMPLETED
Start: 2021-02-17 | End: 2021-02-17

## 2021-02-17 RX ORDER — SODIUM CHLORIDE, SODIUM LACTATE, POTASSIUM CHLORIDE, CALCIUM CHLORIDE 600; 310; 30; 20 MG/100ML; MG/100ML; MG/100ML; MG/100ML
20 INJECTION, SOLUTION INTRAVENOUS CONTINUOUS
Status: DISCONTINUED | OUTPATIENT
Start: 2021-02-17 | End: 2021-02-18

## 2021-02-17 RX ORDER — LIDOCAINE HYDROCHLORIDE 10 MG/ML
INJECTION, SOLUTION EPIDURAL; INFILTRATION; INTRACAUDAL; PERINEURAL AS NEEDED
Status: DISCONTINUED | OUTPATIENT
Start: 2021-02-17 | End: 2021-02-17 | Stop reason: HOSPADM

## 2021-02-17 RX ADMIN — METOROPROLOL TARTRATE 1 MG: 5 INJECTION, SOLUTION INTRAVENOUS at 16:18

## 2021-02-17 RX ADMIN — METRONIDAZOLE 500 MG: 500 TABLET ORAL at 02:57

## 2021-02-17 RX ADMIN — PROPOFOL 50 MCG/KG/MIN: 10 INJECTION, EMULSION INTRAVENOUS at 16:04

## 2021-02-17 RX ADMIN — OXYCODONE HYDROCHLORIDE 5 MG: 5 TABLET ORAL at 21:37

## 2021-02-17 RX ADMIN — DILTIAZEM HYDROCHLORIDE 120 MG: 60 TABLET, FILM COATED ORAL at 20:30

## 2021-02-17 RX ADMIN — CEFEPIME HYDROCHLORIDE 2000 MG: 2 INJECTION, POWDER, FOR SOLUTION INTRAVENOUS at 02:54

## 2021-02-17 RX ADMIN — SODIUM CHLORIDE 75 ML/HR: 0.9 INJECTION, SOLUTION INTRAVENOUS at 04:49

## 2021-02-17 RX ADMIN — LISINOPRIL 40 MG: 20 TABLET ORAL at 08:33

## 2021-02-17 RX ADMIN — SODIUM CHLORIDE, SODIUM LACTATE, POTASSIUM CHLORIDE, AND CALCIUM CHLORIDE: .6; .31; .03; .02 INJECTION, SOLUTION INTRAVENOUS at 16:01

## 2021-02-17 RX ADMIN — VANCOMYCIN HYDROCHLORIDE 1250 MG: 10 INJECTION, POWDER, LYOPHILIZED, FOR SOLUTION INTRAVENOUS at 13:38

## 2021-02-17 RX ADMIN — METRONIDAZOLE 500 MG: 500 TABLET ORAL at 21:37

## 2021-02-17 RX ADMIN — FENTANYL CITRATE 25 MCG: 50 INJECTION INTRAMUSCULAR; INTRAVENOUS at 17:01

## 2021-02-17 RX ADMIN — CEFEPIME HYDROCHLORIDE 2000 MG: 2 INJECTION, POWDER, FOR SOLUTION INTRAVENOUS at 10:43

## 2021-02-17 RX ADMIN — DILTIAZEM HYDROCHLORIDE 120 MG: 60 TABLET, FILM COATED ORAL at 11:44

## 2021-02-17 RX ADMIN — APIXABAN 5 MG: 5 TABLET, FILM COATED ORAL at 08:33

## 2021-02-17 RX ADMIN — METRONIDAZOLE 500 MG: 500 TABLET ORAL at 11:44

## 2021-02-17 RX ADMIN — VANCOMYCIN HYDROCHLORIDE 2000 MG: 1 INJECTION, POWDER, LYOPHILIZED, FOR SOLUTION INTRAVENOUS at 02:57

## 2021-02-17 RX ADMIN — OXYCODONE HYDROCHLORIDE 10 MG: 10 TABLET ORAL at 13:38

## 2021-02-17 RX ADMIN — FENTANYL CITRATE 50 MCG: 50 INJECTION INTRAMUSCULAR; INTRAVENOUS at 16:04

## 2021-02-17 RX ADMIN — INSULIN LISPRO 1 UNITS: 100 INJECTION, SOLUTION INTRAVENOUS; SUBCUTANEOUS at 02:02

## 2021-02-17 RX ADMIN — ACETAMINOPHEN 650 MG: 325 TABLET ORAL at 20:40

## 2021-02-17 RX ADMIN — METOROPROLOL TARTRATE 1 MG: 5 INJECTION, SOLUTION INTRAVENOUS at 16:14

## 2021-02-17 RX ADMIN — SENNOSIDES 8.6 MG: 8.6 TABLET, FILM COATED ORAL at 21:37

## 2021-02-17 RX ADMIN — ACETAMINOPHEN 975 MG: 325 TABLET ORAL at 08:33

## 2021-02-17 RX ADMIN — MIDAZOLAM 2 MG: 1 INJECTION INTRAMUSCULAR; INTRAVENOUS at 16:01

## 2021-02-17 NOTE — CONSULTS
Consultation - Infectious Disease   Leah Ramon  78 y o  male MRN: [id number]  Unit/Bed#: [facility] 308-01 Encounter: [id number]      IMPRESSION & RECOMMENDATIONS:   72-year-old male patient history of diabetes, hypertension, hyperlipidemia, AFib, clubfoot status post recent reconstruction, external fixator placement presents to the hospital with sepsis  1  Right foot abscess-infected hematoma-cellulitis-septic arthritis- ca not R/Out Necrotizing fascitis   2A  Sepsis due to 1   2B  Acute encephalopathy due to 2 -improved  3  AFib with RVR  4  Diabetes type 2  5  Hyperlipidemia    Plan:   · Sepsis evidenced by fever, tachycardia, tachypnea-in the setting of right foot skin soft tissue infection-possible superimposing a 4th, 5th tarsometatarsal joint infection  · Continue vancomycin, cefepime, Flagyl  · Descalate per Cx results  · Follow blood , Wound , Synovial Fluid Cx  · For hardware removal, debridement, I/D by Podiatry today  · Monitor temperature, white blood cell count care  · Diabetes, heart rate control, , hypertension , anticoagulation plan per internal medicine team   · Rest of management per Podiatry  · to be discussed with my attending      Have discussed the above management plan in detail with the primary service    Extensive review of the medical records in epic including review of the notes, radiographs, and laboratory results     HISTORY OF PRESENT ILLNESS:  Reason for Consult: Sepsis  HPI:   72-year-old male patient with history of diabetes, hypertension, hyperlipidemia, AFib, clubfoot presents to the hospital for altered mental status  Of note:  Patient was discharged from [facility] on Saturday the 13 after hospital admission for reconstructive surgery with external fixator application  Had minor bleed required additional suture placement  Patient has not been seen by wound care or visiting nurse since discharge      Patient presented yesterday and 02/16 /2021 the chief complaint of chills, fever, palpitation  Patient noted to be confused per his wife who called a/joynce  Patient denies any shortness of breath, chest pain, dizziness, lightheadedness, cough, nausea, vomiting, abdominal pain, urinary manifestations  Upon presentation patient had a temp of 103°, noted to have tachycardia heart rate ranged from 1   Maintaining saturation in room air  Review of blood work leukocytosis with a white blood cell count of 14 65  JUSTYN with creatinine 1 46  Lactic acid 2 1  Pending blood culture  X-ray of the right foot shows increased degree of indistinct cortex of the force and 5th tarsometatarsal joint with adjacent soft tissue swelling suggesting infection  Patient was started on Vanco, cefepime, Flagyl  REVIEW OF SYSTEMS:  A complete review of systems is negative other than that noted in the HPI      PAST MEDICAL HISTORY:  Past Medical History:   Diagnosis Date    Arthritis     Atrial fibrillation ([address] )     Diagnosed 11/2018    Benign prostate hyperplasia 04/2002    Cellulitis     right lower leg     Colon polyp 2006    Diabetes mellitus ([address] )     Hearing aid worn     bilat    Hearing loss     90% loss left ear and 40% right ear    History of clubfoot     "since birth"    History of pneumonia     History of TIA (transient ischemic attack) 04/25/2017 11/30/18 pt denies    Hyperlipidemia 5/15/2014    Hypertension     Infectious viral hepatitis     "cant remember type"    Irregular heart beat     Neuropathy     both feet    Osteomyelitis ([address] )     right great toe    Right middle lobe pulmonary nodule 11/6/2018    Seasickness     Teeth missing     Type 2 diabetes mellitus with diabetic neuropathy ([address] ) 11/6/2018    Wears glasses     reading    Wound, open     bottom of right foot     Past Surgical History:   Procedure Laterality Date    BUNIONECTOMY Right 12/4/2018    Procedure: 5TH METATARSAL BONE PARTIAL RESECTION, FULL THICKNESS DEBRIDEMENT OF DIABETIC ULCER;  Surgeon: Madelyn Oewns DPM;  [facility];  Service: Podiatry    CLUB FOOT RELEASE Bilateral     COLONOSCOPY      EXTERNAL FIXATOR APPLICATION Right     Procedure: Application multiplane external fixation;  Surgeon: Samuel Thompson DPM;  [facility];  Service: Podiatry    MD FUSION FOOT BONES,MIDTARSAL,OSTEOTMY Right 2021    Procedure: foot ARTHRODESIS/FUSION;  Surgeon: Samuel Thompson DPM;  [facility];  Service: Podiatry    MD LENGTH/SHORT LEG/ANKL TENDON,SINGLE Right 2021    Procedure: LENGTHEN TIBIAL TENDON, TRANS HEEL CORD;  Surgeon: Samuel Thompson DPM;  [facility];  Service: Podiatry    MD PART [facility] Right 2020    Procedure: EXCISION EXOSTOSIS;  Surgeon: Samuel Thompson DPM;  [facility];  Service: Susu Dennis WND EXTEN/COMPLIC Right     Procedure: PRIMARY DELAYED CLOSURE;  Surgeon: Samuel Thompson DPM;  [facility];  Service: Podiatry    TOE AMPUTATION Right     partial great toe    VASECTOMY         FAMILY HISTORY:  Non-contributory    SOCIAL HISTORY:  Social History   Social History     Substance and Sexual Activity   Alcohol Use Yes    Frequency: 2-4 times a month    Drinks per session: 1 or 2    Comment: socially     Social History     Substance and Sexual Activity   Drug Use Not Currently     Social History     Tobacco Use   Smoking Status Former Smoker    Types: Cigarettes    Quit date: 1988    Years since quittin 3   Smokeless Tobacco Former User       ALLERGIES:  Allergies   Allergen Reactions    Simvastatin Myalgia    Itraconazole Other (See Comments)     Pt denies knowledge of allergy  Sidney Manifold MEDICATIONS:  All current active medications have been reviewed        PHYSICAL EXAM:  Temp:  [99 °F (37 2 °C)-103 °F (39 4 °C)] 101 9 °F (38 8 °C)  HR:  [] 115  Resp:  [14-26] 18  BP: (115-136)/(58-81) 132/69  SpO2:  [95 %-99 %] 95 %  Temp (24hrs), Av 5 °F (38 1 °C), Min:99 °F (37 2 °C), Max:103 °F (39 4 °C)  Current: Temperature: (!) 101 9 °F (38 8 °C)    Intake/Output Summary (Last 24 hours) at 2021 0959  Last data filed at 2021 8617  Gross per 24 hour   Intake 2331 25 ml   Output 1250 ml   Net 1081 25 ml       General Appearance:  Appearing well, nontoxic, and in no distress   Head:  Normocephalic, without obvious abnormality, atraumatic   Eyes:  Conjunctiva pink and sclera anicteric, both eyes   Nose: Nares normal, mucosa normal, no drainage   Throat: Oropharynx moist without lesions   Neck: Supple, symmetrical, no adenopathy, no tenderness/mass/nodules   Back:   Symmetric, no curvature, ROM normal, no CVA tenderness   Lungs:   Clear to auscultation bilaterally, respirations unlabored   Chest Wall:  No tenderness or deformity   Heart:  Tachycardia, irregular,; no murmur, rub or gallop   Abdomen:   Soft, non-tender, non-distended, positive bowel sounds    Extremities: External fixator in place-dressing in place  Swelling noted  , erythema on the right footp   Skin: No rashes or lesions  No draining wounds noted  Lymph nodes: Cervical, supraclavicular nodes normal   Neurologic: Alert and oriented times 3, extremity strength 5/5 and symmetric       LABS, IMAGING, & OTHER STUDIES:  Lab Results:  I have personally reviewed pertinent labs    Results from last 7 days   Lab Units 21  0752 21  1648   WBC Thousand/uL 8 58 14 65*   HEMOGLOBIN g/dL 11 1* 12 3   PLATELETS Thousands/uL 178 186     Results from last 7 days   Lab Units 21  0752 21  1648   SODIUM mmol/L 139 137   POTASSIUM mmol/L 3 9 3 7   CHLORIDE mmol/L 111* 106   CO2 mmol/L 22 22   BUN mg/dL 10 12   CREATININE mg/dL 1 02 1 05   EGFR ml/min/1 73sq m 75 73   CALCIUM mg/dL 8 9 8 8   AST U/L  --  52*   ALT U/L  --  61   ALK PHOS U/L  --  120*     Results from last 7 days   Lab Units 21  1648 21  1630   BLOOD CULTURE  Received in Microbiology Lab  Culture in Progress  Received in Microbiology Lab  Culture in Progress  Results from last 7 days   Lab Units 02/16/21  1648   PROCALCITONIN ng/ml 1 08*                   Imaging Studies:   I have personally reviewed pertinent imaging study reports and images in PACS  Other Studies:   I have personally reviewed pertinent reports

## 2021-02-17 NOTE — ANESTHESIA PREPROCEDURE EVALUATION
Procedure:  INCISION AND DRAINAGE (I&D) EXTREMITY (Right Foot)  REMOVAL EXTERNAL FIXATOR (Right Leg Lower)    Relevant Problems   CARDIO   (+) Atrial fibrillation with RVR (HCC)   (+) Essential hypertension   (+) Hyperlipidemia      ENDO   (+) Type 2 diabetes mellitus with diabetic neuropathy (HCC)      /RENAL   (+) Benign prostatic hyperplasia with lower urinary tract symptoms      NEURO/PSYCH   (+) Type 2 diabetes mellitus with diabetic neuropathy (HCC)      CAD/PCI/MI/CHF -- denies  COPD/ASTHMA/RANDI -- denies  PROBS WITH PRIOR ANESTHESIA -- denies  NPO STATUS CONFIRMED      Lab Results   Component Value Date    WBC 8 58 02/17/2021    HGB 11 1 (L) 02/17/2021     02/17/2021     Lab Results   Component Value Date    SODIUM 139 02/17/2021    K 3 9 02/17/2021    BUN 10 02/17/2021    CREATININE 1 02 02/17/2021    EGFR 75 02/17/2021    GLUCOSE 110 (H) 05/22/2018     Lab Results   Component Value Date    PTT 37 02/16/2021      Lab Results   Component Value Date    INR 1 47 (H) 02/17/2021           Lab Results   Component Value Date    HGBA1C 7 8 (H) 12/23/2020           Physical Exam    Airway    Mallampati score: II  TM Distance: >3 FB       Dental   No notable dental hx     Cardiovascular      Pulmonary      Other Findings        Anesthesia Plan  ASA Score- 3     Anesthesia Type- IV sedation with anesthesia with ASA Monitors  Additional Monitors:   Airway Plan:     Comment: WALKER Luna , have personally seen and evaluated the patient prior to anesthetic care  I have reviewed the pre-anesthetic record, and other medical records if appropriate to the anesthetic care  If a CRNA is involved in the case, I have reviewed the CRNA assessment, if present, and agree  Risks/benefits and alternatives discussed with patient including possible PONV, sore throat, and possibility of rare anesthetic and surgical emergencies          Plan Factors-    Chart reviewed  EKG reviewed   Imaging results reviewed  Existing labs reviewed  Patient summary reviewed  Induction-     Postoperative Plan- Plan for postoperative opioid use  Planned trial extubation    Informed Consent- Anesthetic plan and risks discussed with patient  I personally reviewed this patient with the CRNA  Discussed and agreed on the Anesthesia Plan with the OLIVIER Romero

## 2021-02-17 NOTE — QUICK NOTE
Patient not seen today by me as he  remains in the operation room  Agree with current antibiotic coverage with vancomycin, cefepime and Flagyl  Will evaluate formally in a m

## 2021-02-17 NOTE — PROGRESS NOTES
Vancomycin IV Pharmacy-to-Dose Consultation    Alison Cushing  is a 78 y o  male who is currently receiving Vancomycin IV with management by the Pharmacy Consult service  Relevant clinical data and objective history reviewed:  Temp Readings from Last 3 Encounters:   02/17/21 (!) 101 9 °F (38 8 °C) (Oral)   02/13/21 98 °F (36 7 °C) (Temporal)   01/29/21 98 3 °F (36 8 °C)     /69   Pulse (!) 115   Temp (!) 101 9 °F (38 8 °C) (Oral)   Resp 18   Ht 6' 4" (1 93 m)   Wt 109 kg (240 lb 15 4 oz)   SpO2 95%   BMI 29 33 kg/m²     I/O last 3 completed shifts:   In: 2331 3 [I V :681 3; IV Piggyback:1650]  Out: 1100 [Urine:1100]    Lab Results   Component Value Date/Time    BUN 10 02/17/2021 07:52 AM    BUN 20 05/22/2018 04:38 PM    WBC 8 58 02/17/2021 07:52 AM    WBC 7 4 05/22/2018 04:38 PM    HGB 11 1 (L) 02/17/2021 07:52 AM    HGB 14 5 05/22/2018 04:38 PM    HCT 34 6 (L) 02/17/2021 07:52 AM    HCT 44 1 05/22/2018 04:38 PM    MCV 97 02/17/2021 07:52 AM    MCV 95 05/22/2018 04:38 PM     02/17/2021 07:52 AM     05/22/2018 04:38 PM     Creatinine   Date Value Ref Range Status   02/17/2021 1 02 0 60 - 1 30 mg/dL Final     Comment:     Standardized to IDMS reference method   02/16/2021 1 05 0 60 - 1 30 mg/dL Final     Comment:     Standardized to IDMS reference method         Vancomycin Assessment:  Indication: skin-soft tissue infection    Status: stable  Micro: blood cultures in progress, COVID negative  Procalcitonin: 1 08 (2/16)  Renal Function: Scr 1 02, UOP 0 8 mL/kg/hr  Potential Nephrotoxicity Factors:  Medications: lisinopril  Patient-Factors: none  Days of Therapy: 2  Current Dose: vancomycin 2000 mg IV q 12 h (last dose: 2/17 at 0257)  Goal Trough: 15-20 (appropriate for most indications)   Goal AUC(24h): 400-600  Last Level: n/a       Vancomycin Plan:  New Dosing: change to vancomycin 1250 mg IV q 12h (next dose: 2/17 at 1400)  Predicted Trough / AUC: 20 / 672  Next Level: trough 2/18 at 8340  Renal Function Monitoring: Scr, UOP      Pharmacy will continue to follow closely for s/sx of nephrotoxicity, infusion reactions and appropriateness of therapy  BMP and CBC will be ordered per protocol  We will continue to follow the patient's culture results and clinical progress daily       Keeley Carrizales, PGY-1 Pharmacy Resident

## 2021-02-17 NOTE — OP NOTE
OPERATIVE REPORT - Podiatry  PATIENT NAME: Eryn Urbina  :  1950  MRN: [id number]  Pt Location: [facility] OR ROOM 04    SURGERY DATE: 2021    Surgeon(s) and Role:     * Julien Juarez DPM - Primary     * Shannan Nathan DPM - [address] , DPM - Assisting     Pre-op Diagnosis:  Congenital talipes equinovarus, right foot [Q66 01]    Post-Op Diagnosis Codes:     * Congenital talipes equinovarus, right foot [Q66 01]    Procedure(s) (LRB):  INCISION AND DRAINAGE (I&D) EXTREMITY (Right)  REMOVAL EXTERNAL FIXATOR (Right)    Specimen(s):  ID Type Source Tests Collected by Time Destination   A : Deep tissue culture Wound Foot, Right ANAEROBIC CULTURE AND GRAM STAIN, WOUND CULTURE FARZANEH Looney 2021 1634    B : lateral wound Wound Foot, Right ANAEROBIC CULTURE AND GRAM STAIN, WOUND CULTURE FARZANEH Looney 2021 1636        Estimated Blood Loss:   Minimal    Drains:  * No LDAs found *    Anesthesia Type:   Choice with 10 ml of 1% Lidocaine and 0 5% Bupivacaine in a 1:1 mixture post-op    Hemostasis:  Anatomic dissection    Materials:  Implant Name Type Inv  Item Serial No   Lot No  LRB No  Used Action   STRUT TELESCOPIC LONG 177-277MM RED - [id number]  STRUT TELESCOPIC LONG 177-277MM RED  [employer]  Right 1 Explanted   FOOT ARCH 155MM CARBON - [id number]  FOOT ARCH 155MM CARBON  [employer]  Right 1 Explanted   RING FOOT 155MM LONG - [id number]  RING FOOT 155MM LONG  [employer]  Right 1 Explanted   STRUT TELESCOPIC -201MM BLUE - [id number]  STRUT TELESCOPIC -201MM BLUE  [employer]  Right 3 Explanted   RING FULL 180MM - [id number]  RING FULL 180MM  [employer]  Right 1 Explanted   HINGE COUPLING - [id number]  HINGE COUPLING  [employer]  Right 2 Explanted   WIRE BOLT LONG 1 5-2  0MM - [id number]  WIRE BOLT LONG 1 5-2 0MM  [employer]  Right 1 Explanted   WIRE BOLT MEDIUM 1 5-2  0MM - [id number]  WIRE BOLT MEDIUM 1 5-2 0MM  [employer] Right 6 Explanted   JENNIFER 6 X 100MM THRD - [id number]  JENNIFER 6 X 100MM THRD  [employer]  Right 1 Explanted   KWIRE 2 5 X 150MM - [id number]  KWIRE 2 5 X 150MM  [employer] [id number] Right 2 Explanted   WIRE BOLT OFFSET ADAPTER LONG - [id number]  WIRE BOLT OFFSET ADAPTER LONG  [employer]  Right 1 Explanted   PIN APE X TRANSFI X 4 X 300 5 X 50THR - [id number]  PIN APE X TRANSFI X 4 X 300 5 X 50THR  [employer]  Right 1 Explanted   K-WIRE 2MM X 450MM - [id number]  K-WIRE 2MM X 450MM  [employer]  Right 3 Explanted   NUT CONNECTING M8 SHORT - [id number]  NUT CONNECTING M8 SHORT  [employer]  Right 18 Explanted     Two bottles quarter-inch iodoform packing    Operative Findings:  Large amount of hematoma and serous fluid expressed from 4 incision sites most notably dorso-medial and plantar-medial   Superficial purulence noted from lateral forefoot pin site, however no areas of deep of purulence or necrosis noted  Postoperatively the right hallux appear to have improved capillary fill time (5s fill pre-op to 3 5s post-op) and improved appearance proximally  Underlying soft tissue and bone appeared healthy, bleeding, viable  Complications:   None    Procedure and Technique:     Under mild sedation, the patient was brought into the operating room and placed on the operating room table in the supine position  IV sedation was achieved by anesthesia team and a universal timeout was performed where all parties are in agreement of correct patient, correct procedure and correct site  A pneumatic tourniquet was then placed over the patient's right lower extremity with ample padding, however the tourniquet was not inflated for the duration of the case  Attention was directed to the right foot and external fixator  All bolts were removed from the external fixator  At this point all 4 pins were cut on one side of each pin    The cut side of each pin was then prepped with Betadine prep and pulled through the contralateral part of the lower extremity utilizing a pin vice  At this point the external fixator was completely removed from the foot and leg  The foot was then prepped and draped in the usual sterile fashion  Attention was then again directed to the right foot  The right hallux was noted to be dusky in appearance with approx  5 second capillary refill time  Sutures removed from the larger dorsal medial incision as well as the larger dorsal lateral incision  Incision was then made over the central part of the dorsal forefoot as well to allow for proper decompression of the compartment  All 3 incisions were bluntly explored utilizing hemostat  Noted expression of copious amounts of hematoma and serous fluid, however there were no areas of deep purulence noted and all soft tissue and bone appeared healthy, viable  Deep cultures were taken of the dorsal medial and lateral foot incisions  There is an area of superficial purulence noted at the lateral forefoot pin tract over this did not track deep in the foot upon exploration  Attention was then directed to the right plantar arch where an area of epidermolysis/serous blister formed  This blister was then de roofed and utilizing a 15 blade, a 3 cm incision was made along the plantar arch  Copious amounts of hematoma and serous fluid were expressed however there are no areas of deep purulence noted and all underlying soft tissue and bone appeared healthy, viable  Upon decompression of all compartments, the right hallux capillary refill time appeared to improve approximately 3 5 seconds  All incisions were then flushed with copious amounts of normal sterile saline  All 4 incisions were lightly packed utilizing 2 bottles of quarter-inch iodoform  The incisions were then dressed with 4 x 4 gauze, ABD, Kerlix, light Ace compression      The patient tolerated the procedure and anesthesia well without immediate complications and transferred to PACU with vital signs stable  As with many limb salvage procedures, we contemplate the possibility of performing further stages to this procedure  Procedures may include debridements, delayed closure, plastic surgery techniques, or more proximal amputations  This procedure may be considered part of a multi-staged limb salvage treatment plan  Dr Nahomy Sanford was present during the entire procedure and participated in all key aspects  SIGNATURE: Polly Quinonez DPM  DATE: February 17, 2021  TIME: 5:08 PM      Portions of the record may have been created with voice recognition software  Occasional wrong word or "sound a like" substitutions may have occurred due to the inherent limitations of voice recognition software  Read the chart carefully and recognize, using context, where substitutions have occurred

## 2021-02-17 NOTE — PROGRESS NOTES
Vancomycin Assessment    Isidoro Unger  is a 78 y o  male who is currently receiving vancomycin 1750mg q12 hours for       Relevant clinical data and objective history reviewed:  Creatinine   Date Value Ref Range Status   02/16/2021 1 05 0 60 - 1 30 mg/dL Final     Comment:     Standardized to IDMS reference method   12/23/2020 1 14 0 60 - 1 30 mg/dL Final     Comment:     Standardized to IDMS reference method   07/09/2020 1 46 (H) 0 60 - 1 30 mg/dL Final     Comment:     Standardized to IDMS reference method     /58 (BP Location: Right arm)   Pulse (!) 126   Temp 99 9 °F (37 7 °C) (Oral)   Resp 18   Wt 109 kg (241 lb)   SpO2 97%   BMI 29 34 kg/m²   I/O last 3 completed shifts: In: 1150 [IV Piggyback:1150]  Out: -   Lab Results   Component Value Date/Time    BUN 12 02/16/2021 04:48 PM    BUN 20 05/22/2018 04:38 PM    WBC 14 65 (H) 02/16/2021 04:48 PM    WBC 7 4 05/22/2018 04:38 PM    HGB 12 3 02/16/2021 04:48 PM    HGB 14 5 05/22/2018 04:38 PM    HCT 37 3 02/16/2021 04:48 PM    HCT 44 1 05/22/2018 04:38 PM    MCV 94 02/16/2021 04:48 PM    MCV 95 05/22/2018 04:38 PM     02/16/2021 04:48 PM     05/22/2018 04:38 PM     Temp Readings from Last 3 Encounters:   02/16/21 99 9 °F (37 7 °C) (Oral)   02/13/21 98 °F (36 7 °C) (Temporal)   01/29/21 98 3 °F (36 8 °C)     Vancomycin Days of Therapy: 1    Assessment/Plan  The patient is currently on vancomycin utilizing scheduled dosing based on adjusted body weight (due to obesity)  Baseline risks associated with therapy include: pre-existing renal impairment  The patient is currently receiving 1750mg q12 hours and after clinical evaluation will be changed to 2000mg every 12 hours  Pharmacy will also follow closely for s/sx of nephrotoxicity, infusion reactions, and appropriateness of therapy  BMP and CBC will be ordered per protocol    Plan for trough as patient approaches steady state, prior to the 4th  dose at approximately on 02/18/21 at 04 30  Due to infection severity, will target a trough of 15-20 (appropriate for most indications)   Pharmacy will continue to follow the patients culture results and clinical progress daily      Apoorva Marion, Pharmacist

## 2021-02-17 NOTE — ANESTHESIA POSTPROCEDURE EVALUATION
Post-Op Assessment Note    CV Status:  Stable  Pain Score: 0    Pain management: adequate     Mental Status:  Alert and awake   Hydration Status:  Euvolemic   PONV Controlled:  Controlled   Airway Patency:  Patent      Post Op Vitals Reviewed: Yes      Staff: CRNA         No complications documented      BP   146/78   Temp  100 0   Pulse 100   Resp   16   SpO2   98% 6L mask

## 2021-02-17 NOTE — PROGRESS NOTES
Vancomycin IV Pharmacy-to-Dose Consultation    Arina Laird  is a 78 y o  male who is currently receiving Vancomycin IV with management by the Pharmacy Consult service  Assessment/Plan:  The patient was reviewed  Renal function is stable and no signs or symptoms of nephrotoxicity and/or infusion reactions were documented in the chart  Based on todays assessment, will change vancomycin (day # 2) dosing to 1250 mg IV q 12 h, with a plan for trough to be drawn at 2030 on 02/18/21  We will continue to follow the patients culture results and clinical progress daily      Mikal Powell, Pharmacist

## 2021-02-17 NOTE — CONSULTS
[address] Internal Medicine-SOD TEAM B   Baron Britt  78 y o  male MRN: [id number]  Unit/Bed#: [facility] 308-01 Encounter: [id number]    Code Status: Level 1 - Full Code  POA:      Reason for Admission / Principal Problem: Sepsis  Reason for Consult:  Preoperative clearance and medical management    Impression:  Patient Active Problem List   Diagnosis    Hyperlipidemia    Type 2 diabetes mellitus with diabetic neuropathy ([facility] )    Atrial fibrillation with RVR ([facility] )    Right middle lobe pulmonary nodule    Essential hypertension    Acquired absence of other right toe(s) ([facility] )    Benign prostatic hyperplasia with lower urinary tract symptoms    Positive for microalbuminuria    Congenital talipes equinovarus, right foot    Deep disruption or dehiscence of operation wound    Post-operative state    Sepsis ([place] [address] )    Cellulitis of extremity    Abscess    Drug-induced constipation       A/P:  1  Preoperative clearance  Risk Factor Score (Yes=1, No=0)   Hx of TIA/CVA 0   Hx of prior ischemic heart disease (AMI, unstable angina, Q waves on EKG, CABG) 0   Hx of congestive heart failure 0   Serum Creatinine >2 mg/dl 0   Insulin dependent diabetes mellitus 0   Total Score 0     Risk of MACE (30-day)     Points Risk % (95% CI), Ammy 2017 Risk % (95% CI) Kris, 1999   0 3 9 (2 8-5 4) 0 4 (0 05-1 5)   1 6 (4 9-7 4) 0 9 (0 3-2 1)   2 10 1 (8 1-12 6) 6 6 (3 9-10 3)   3 or more 15 (11 1-20) >11 (5 8-18 4)     - patient is cleared from a medicine standpoint for planned operative intervention later today  - patient has no symptoms concerning for chest pain or anginal equivalents  - no history of CAD, CVA, CHF, CKD, or IDDM    2   Atrial fibrillation  - patient has longstanding history of atrial fibrillation on Cardizem for rate control and Eliquis for anticoagulation  - would recommend holding Eliquis prior to procedure, however nursing informs me that patient has received his dose this morning  - depending on operative course, would recommend restarting Eliquis this evening  - if after OR presentation today patient may require further operative interventions, would recommend patient be placed on a heparin infusion  - continue diltiazem for rate control  - recommend telemetry monitoring given history of RVR    3  Hypertension  - patient has history of longstanding hypertension on lisinopril and Cardizem  - would recommend holding lisinopril prior to operative intervention, however nursing informed me that patient received his dose this morning  - continue lisinopril and Cardizem  - trend BMP in anticipation of JUSTYN tomorrow    4  Hyperlipidemia  - continue home statin    5  Sepsis  - present on arrival  - secondary to developing right foot abscess in postoperative patient  - management per primary team  - patient started on broad-spectrum antibiotics on arrival, recommend narrowing based on blood and intraoperative cultures  - infectious Disease consult pending  - trend white count and fever curve    6  Altered mental status   - appears to be improving  - patient answers orientation questions appropriately, but does exhibit some delays in this regard, would recommend monitoring closely in the postoperative setting      HPI: Kaylan Falcon  is a 78 y o  male with past medical history significant for type 2 diabetes mellitus, hypertension, hyperlipidemia, atrial fibrillation on diltiazem for rate control and Eliquis for anticoagulation who presented last night with altered mental status  He was found to meet sepsis criteria  This is all in the setting of this patient being 4 days following recent brief admission at the [facility] under Podiatry for right foot arthrodesis, tendo-Achilles lengthening, and application of external fixator  Patient was found to be febrile and tachypneic on arrival to the ED    Patient was also found to be in AFib with rates in the 110s  His CMP was largely unrevealing  His CBC showed a [place] count of 49598  His initial lactate was minimally elevated at 2 1, however that has since down trended to normal   Patient was then admitted to the podiatry service  Patient was started on empiric broad-spectrum antibiotics and IV fluids  He was made NPO with plans to report to the OR this morning for hardware removal as well as I&D/washout of the incision sites  Patient was seen and examined at bedside this morning  He reports that he did not receive any visiting nurse or wound care at home since recent hospital discharge  He reports pain with ambulation, but otherwise states he feels well this morning  Anxiously await operative intervention for his foot as he is fed up with his current state      History obtained from chart review, patient, and primary team     PMH:  Past Medical History:   Diagnosis Date    Arthritis     Atrial fibrillation ([address] )     Diagnosed 11/2018    Benign prostate hyperplasia 04/2002    Cellulitis     right lower leg     Colon polyp 2006    Diabetes mellitus ([address] )     Hearing aid worn     bilat    Hearing loss     90% loss left ear and 40% right ear    History of clubfoot     "since birth"    History of pneumonia     History of TIA (transient ischemic attack) 04/25/2017 11/30/18 pt denies    Hyperlipidemia 5/15/2014    Hypertension     Infectious viral hepatitis     "cant remember type"    Irregular heart beat     Neuropathy     both feet    Osteomyelitis ([address] )     right great toe    Right middle lobe pulmonary nodule 11/6/2018    Seasickness     Teeth missing     Type 2 diabetes mellitus with diabetic neuropathy ([address] ) 11/6/2018    Wears glasses     reading    Wound, open     bottom of right foot        PSH:  Past Surgical History:   Procedure Laterality Date    BUNIONECTOMY Right 12/4/2018    Procedure: 5TH METATARSAL BONE PARTIAL RESECTION, FULL THICKNESS DEBRIDEMENT OF DIABETIC ULCER;  Surgeon: Corona Tam DPM;  [facility];  Service: Podiatry    CLUB FOOT RELEASE Bilateral     COLONOSCOPY      EXTERNAL FIXATOR APPLICATION Right     Procedure: Application multiplane external fixation;  Surgeon: Guero oGlden DPM;  [facility];  Service: Podiatry    SC FUSION FOOT BONES,MIDTARSAL,OSTEOTMY Right 2021    Procedure: foot ARTHRODESIS/FUSION;  Surgeon: Guero Golden DPM;  [facility];  Service: Podiatry    SC LENGTH/SHORT LEG/ANKL TENDON,SINGLE Right 2021    Procedure: LENGTHEN TIBIAL TENDON, TRANS HEEL CORD;  Surgeon: Guero Golden DPM;  [facility];  Service: Podiatry    SC PART [facility] Right 2020    Procedure: EXCISION EXOSTOSIS;  Surgeon: Guero Golden DPM;  [facility];  Service: Podiatry    SC SECD CLOS SURG WND EXTEN/COMPLIC Right     Procedure: PRIMARY DELAYED CLOSURE;  Surgeon: Guero Golden DPM;  [facility];  Service: Podiatry    TOE AMPUTATION Right     partial great toe    VASECTOMY         Allergies: Allergies   Allergen Reactions    Simvastatin Myalgia    Itraconazole Other (See Comments)     Pt denies knowledge of allergy           Family History:   Family History   Problem Relation Age of Onset    Diabetes Father         mellitus       Social History:   Social History     Tobacco Use   Smoking Status Former Smoker    Types: Cigarettes    Quit date: 1988    Years since quittin 3   Smokeless Tobacco Former User      Social History     Substance and Sexual Activity   Alcohol Use Yes    Frequency: 2-4 times a month    Drinks per session: 1 or 2    Comment: socially      Social History     Substance and Sexual Activity   Drug Use Not Currently        Home Medications:   Prior to Admission medications    Medication Sig Start Date End Date Taking?  Authorizing Provider   Acetaminophen (TYLENOL ARTHRITIS PAIN PO) Take 650 mg by mouth 2 (two) times a day    Historical Provider, MD   atorvastatin (LIPITOR) 40 mg tablet Take 1 tablet (40 mg total) by mouth daily 12/16/20   Ruba Burnette MD   Blood Glucose Monitoring Suppl (ACCU-CHEK JYOTI SMARTVIEW) w/Device KIT use to check Blood Sugar as needed 3/14/16   Historical Provider, MD   diltiazem (CARDIZEM) 120 MG tablet Take 1 tablet (120 mg total) by mouth 2 (two) times a day 12/16/20   Ruba Burnette MD   Eliquis 5 MG Take 1 tablet (5 mg total) by mouth 2 (two) times a day 12/16/20   Ruba Burnette MD   glimepiride (AMARYL) 4 mg tablet Take one tablet by mouth twice daily  Patient taking differently: Take 4 mg by mouth 2 (two) times a day Take one tablet by mouth twice daily 7/31/20   Ruba Burnette MD   glucose blood (Accu-Chek SmartView) test strip Use as instructed BID 2/13/21   Stefanie Núñez DPM   Januvia 100 MG tablet Take 1 tablet (100 mg total) by mouth daily 12/16/20   Ruba Burnette MD   lisinopril (ZESTRIL) 40 mg tablet Take 1 tablet (40 mg total) by mouth daily 12/16/20   Ruba Burnette MD   metFORMIN (GLUCOPHAGE-XR) 750 mg 24 hr tablet Take 1 tablet (750 mg total) by mouth 2 (two) times a day ONE BID 12/16/20   Ruba Burnette MD   oxyCODONE-acetaminophen (PERCOCET) 5-325 mg per tablet Take 1 tablet by mouth every 6 (six) hours as needed for moderate pain for up to 20 dosesMax Daily Amount: 4 tablets 2/13/21   Stefanie Núñez DPM       ROS:   Review of Systems   Constitutional: Positive for diaphoresis and fever  Negative for chills and fatigue  HENT: Negative for congestion, postnasal drip, rhinorrhea, sinus pressure, sinus pain and sore throat  Respiratory: Negative for cough, shortness of breath and wheezing  Cardiovascular: Negative for chest pain and palpitations  Gastrointestinal: Negative for abdominal pain, constipation, diarrhea, nausea and vomiting     Genitourinary: Negative for difficulty urinating, dysuria, frequency, hematuria and urgency  Musculoskeletal: Positive for arthralgias, gait problem and myalgias  Negative for back pain  Skin: Negative for pallor, rash and wound  Neurological: Negative for dizziness, weakness, light-headedness, numbness and headaches  Psychiatric/Behavioral: Negative for confusion  The patient is not nervous/anxious  Vitals:   Vitals:    02/17/21 0000 02/17/21 0400 02/17/21 0700 02/17/21 0744   BP: 116/60   132/69   BP Location: Left arm      Pulse: 90   (!) 115   Resp:    18   Temp: 99 °F (37 2 °C) 99 °F (37 2 °C) (!) 101 9 °F (38 8 °C) (!) 101 9 °F (38 8 °C)   TempSrc: Oral   Oral   SpO2:    95%   Weight:       Height:         Temp  Min: 99 °F (37 2 °C)  Max: 103 °F (39 4 °C)  IBW: 86 8 kg  Body mass index is 29 33 kg/m²  PHYSICAL EXAM:  Physical Exam  Vitals signs and nursing note reviewed  Constitutional:       General: He is not in acute distress  Appearance: Normal appearance  He is obese  He is diaphoretic  He is not ill-appearing or toxic-appearing  HENT:      Head: Normocephalic and atraumatic  Eyes:      General: No scleral icterus  Extraocular Movements: Extraocular movements intact  Conjunctiva/sclera: Conjunctivae normal       Pupils: Pupils are equal, round, and reactive to light  Neck:      Musculoskeletal: Neck supple  Cardiovascular:      Rate and Rhythm: Normal rate  Rhythm irregular  Pulses: Normal pulses  Heart sounds: Normal heart sounds  No murmur  No friction rub  No gallop  Pulmonary:      Effort: Pulmonary effort is normal  No respiratory distress  Breath sounds: Normal breath sounds  No stridor  No wheezing or rhonchi  Abdominal:      General: Bowel sounds are normal  There is no distension  Palpations: Abdomen is soft  There is no mass  Tenderness: There is no abdominal tenderness  There is no guarding or rebound  Hernia: No hernia is present  Musculoskeletal:         General: Swelling and deformity present   No tenderness  Right lower leg: Edema present  Comments: Right foot currently bandaged with blood clinical grew wraps  Right foot currently maintained in external fixator  Exposed digits of right foot are ecchymotic and cool to the touch  See pictures obtained on arrival in the Media tab  Lymphadenopathy:      Cervical: No cervical adenopathy  Skin:     General: Skin is warm  Capillary Refill: Capillary refill takes less than 2 seconds  Coloration: Skin is not pale  Findings: No erythema or rash  Neurological:      General: No focal deficit present  Mental Status: He is alert and oriented to person, place, and time  Sensory: Sensory deficit (Diminished fine touch sensation distally in bilateral lower extremities) present  Comments: Patient is alert and oriented x3, though does exhibit some delay in finding appropriate answers  Psychiatric:         Mood and Affect: Mood normal          Behavior: Behavior normal          Thought Content: Thought content normal          Judgment: Judgment normal          Labs:   Results from last 7 days   Lab Units 02/17/21  0752 02/16/21  1648   WBC Thousand/uL 8 58 14 65*   HEMOGLOBIN g/dL 11 1* 12 3   HEMATOCRIT % 34 6* 37 3   PLATELETS Thousands/uL 178 186   NEUTROS PCT %  --  88*   MONOS PCT %  --  6     Results from last 7 days   Lab Units 02/17/21  0752 02/16/21  1648   POTASSIUM mmol/L 3 9 3 7   CHLORIDE mmol/L 111* 106   CO2 mmol/L 22 22   BUN mg/dL 10 12   CREATININE mg/dL 1 02 1 05   CALCIUM mg/dL 8 9 8 8   ALK PHOS U/L  --  120*   ALT U/L  --  61   AST U/L  --  52*         Lab Results   Component Value Date    PHOS 2 2 (L) 11/07/2018      Results from last 7 days   Lab Units 02/17/21  0752 02/16/21  1648   INR  1 47* 1 48*   PTT seconds  --  37     No results found for: TROPONINT  ABG:No results found for: PHART, AQP5ZPN, PO2ART, ALJ9MZS, O4GSQGCW, BEART, SOURCE    Imaging: I have personally reviewed pertinent reports      EKG: This was personally reviewed by myself  Micro:  Blood Culture:   Lab Results   Component Value Date    BLOODCX Received in Microbiology Lab  Culture in Progress  02/16/2021    BLOODCX Received in Microbiology Lab  Culture in Progress  02/16/2021    BLOODCX No Growth After 5 Days  11/06/2018    BLOODCX No Growth After 5 Days   11/06/2018     Urine Culture:   Lab Results   Component Value Date    URINECX No Growth <1000 cfu/mL 11/13/2019    URINECX >100,000 cfu/ml Escherichia coli (A) 10/25/2019     Sputum Culture: No components found for: SPUTUMCX  Wound Culure: No results found for: WOUNDCULT    VTE Pharmacologic Prophylaxis: Eliquis  VTE Mechanical Prophylaxis: sequential compression device    Polina Bonilla DO  2/17/2021 8:43 AM

## 2021-02-17 NOTE — PROGRESS NOTES
Podiatry - Progress Note  Patient: Percy Becerril  78 y o  male   MRN: [id number]  PCP: Griffin Browne MD  Unit/Bed#: [facility] 308-01 Encounter: [id number]  Date Of Visit: 21    ASSESSMENT:    Percy Becerril  is a 78 y o  male with:    1  Right foot abscess   2  Congenital talipes equinovarus right foot  3  T2Dm, last A1C 7 8 %  4  HTN  5  Altered mental status  6  Constipation  7  strial fibrillation  8  Sepsis on admission    PLAN:    · Patient to go to OR today, 21, for Right LE removal of hardware and incision & drainage with Dr Mimi Sosa  · Consent placed in chart  To be signed with surgeon prior to procedure  · Confirmed NPO status  · H&P, vitals, and current labs reviewed  No acute changes noted  · Alternatives, risks, and complications discussed with patient  · All questions answered  No guarantees given to outcome of procedure  SUBJECTIVE:     The patient was seen, evaluated, and assessed at bedside today  The patient was awake, alert, and in no acute distress  Patient confirmed NPO status  All questions and concerns regarding the surgical procedure addressed  Patient understands risks vs benefits of procedure and remains amenable with plan for surgery today  Patient denies N/V/F/chills/SOB/CP  OBJECTIVE:     Vitals:   /69   Pulse (!) 115   Temp (!) 101 9 °F (38 8 °C) (Oral)   Resp 18   Ht 6' 4" (1 93 m)   Wt 109 kg (240 lb 15 4 oz)   SpO2 95%   BMI 29 33 kg/m²     Temp (24hrs), Av 5 °F (38 1 °C), Min:99 °F (37 2 °C), Max:103 °F (39 4 °C)      Physical Exam:     General:  Alert, cooperative, and in no distress  Lower extremity exam:  Cardiovascular status at baseline  Neurological status at baseline  Musculoskeletal status at baseline  No calf tenderness noted bilaterally  Dressing left intact to the Operating Room  Right foot digits 1 & 2 appear ecchymotic and cool with delayed CFT on exam, sensation intact      Additional Data:     Labs:    Results from last 7 days   Lab Units 02/17/21  0752 02/16/21  1648   WBC Thousand/uL 8 58 14 65*   HEMOGLOBIN g/dL 11 1* 12 3   HEMATOCRIT % 34 6* 37 3   PLATELETS Thousands/uL 178 186   NEUTROS PCT %  --  88*   LYMPHS PCT %  --  5*   MONOS PCT %  --  6   EOS PCT %  --  0     Results from last 7 days   Lab Units 02/17/21  0752 02/16/21  1648   POTASSIUM mmol/L 3 9 3 7   CHLORIDE mmol/L 111* 106   CO2 mmol/L 22 22   BUN mg/dL 10 12   CREATININE mg/dL 1 02 1 05   CALCIUM mg/dL 8 9 8 8   ALK PHOS U/L  --  120*   ALT U/L  --  61   AST U/L  --  52*     Results from last 7 days   Lab Units 02/17/21  0752   INR  1 47*       * I Have Reviewed All Lab Data Listed Above  Recent Cultures (last 7 days):     Results from last 7 days   Lab Units 02/16/21  1648 02/16/21  1630   BLOOD CULTURE  Received in Microbiology Lab  Culture in Progress  Received in Microbiology Lab  Culture in Progress  Imaging: I have personally reviewed pertinent films in PACS  EKG, Pathology, and Other Studies: I have personally reviewed pertinent reports  ** Please Note: Portions of the record may have been created with voice recognition software  Occasional wrong word or "sound a like" substitutions may have occurred due to the inherent limitations of voice recognition software  Read the chart carefully and recognize, using context, where substitutions have occurred   **

## 2021-02-17 NOTE — H&P
[facility] Podiatry - H&P  Nano Rush  78 y o  male MRN: [id number]  Unit/Bed#: ED 26 Encounter: [id number]  Admission Date: 02/16/21  ASSESSMENT:     Nano Rush  is a 78 y o  male with:     1  Right foot abscess   2  Congenital talipes equinovarus right foot  3  T2Dm, last A1C 7 8 %  4  HTN  5  Altered mental status  6  Constipation  7  strial fibrillation  8  Sepsis on admission        PLAN:     · Will plan for I&D with pin removal 2/17/21 with Dr Eugenio Garsia  · Continue empiric antibiotic therapy with vanc/cefepime/flagyl  · Npo after midnight  · Start fluids for iv hydration and elevated lactate  · NWB to RLE  · Continue eliquis for a-fib  · Bowel regimine started for constipation associated with opioids  · SOD consulted for preoperative clearance  Dr Shelia Shin was spoken with over [facility] Text and he did not feel this patient had a primary problem concerning the medical service and they will follow as a consult   · Rest of care per primary team   · Will discuss this plan with my attending and update as needed         SUBJECTIVE     History of Present Illness:     Nano Rush  is a 78 y o  male with past medical history of Diabetes, clubfoot who presents with ricent right foot reconstructive surgery DOS: 2/12/2021  He had a midfoot fusion with external fixator application  He  had post operative bleeding through incision which stopped following additional suture placement  Bleeding immediately stopped  He was discharged from the hospital 2/13/21  He has not been seen by visiting nurses  He states that he has not had any appetite since returning home  He has had fevers and chills worsening over the last 2 days  He noticed yesterday some changes in toe color  He has now noticed change in temperature and difficulty moving his toes  He has pain 7-8/10 which opioids provide little help with  He has not been able to have a bowel movement since discharge  Review of Systems:    Constitutional: Negative      HENT: Negative  Eyes: Negative  Respiratory: Negative  Cardiovascular: irregular rythym  Gastrointestinal: Negative  Musculoskeletal: right foot pain   Skin:color change  redness   Neurological: numbness  Psych: Negative       Past Medical and Surgical History:     Past Medical History:   Diagnosis Date    Arthritis     Atrial fibrillation ([facility] [address] )     Diagnosed 11/2018    Benign prostate hyperplasia 04/2002    Cellulitis     right lower leg     Colon polyp 2006    Diabetes mellitus ([facility] [address] )     Hearing aid worn     bilat    Hearing loss     90% loss left ear and 40% right ear    History of clubfoot     "since birth"    History of pneumonia     History of TIA (transient ischemic attack) 04/25/2017 11/30/18 pt denies    Hyperlipidemia 5/15/2014    Hypertension     Infectious viral hepatitis     "cant remember type"    Irregular heart beat     Neuropathy     both feet    Osteomyelitis ([facility] [address] )     right great toe    Right middle lobe pulmonary nodule 11/6/2018    Seasickness     Teeth missing     Type 2 diabetes mellitus with diabetic neuropathy ([facility] [address] ) 11/6/2018    Wears glasses     reading    Wound, open     bottom of right foot       Past Surgical History:   Procedure Laterality Date    BUNIONECTOMY Right 12/4/2018    Procedure: 5TH METATARSAL BONE PARTIAL RESECTION, FULL THICKNESS DEBRIDEMENT OF DIABETIC ULCER;  Surgeon: Nahomy Ontiveros DPM;  [facility];  Service: Podiatry    CLUB FOOT RELEASE Bilateral     COLONOSCOPY      EXTERNAL FIXATOR APPLICATION Right 7/86/4124    Procedure: Application multiplane external fixation;  Surgeon: Musa Hernandez DPM;  [facility];  Service: Podiatry    WI FUSION FOOT BONES,MIDTARSAL,OSTEOTMY Right 2/12/2021    Procedure: foot ARTHRODESIS/FUSION;  Surgeon: Musa Hernandez DPM;  [facility];  Service: Podiatry    WI LENGTH/SHORT LEG/ANKL TENDON,SINGLE Right 2/12/2021    Procedure: LENGTHEN TIBIAL TENDON, TRANS HEEL CORD; Surgeon: Musa Hernandez DPM;  [facility];  Service: Podiatry    NH PART [facility] Right 2020    Procedure: EXCISION EXOSTOSIS;  Surgeon: Musa Hernandez DPM;  [facility];  Service: Peg Kiran WND EXTEN/COMPLIC Right     Procedure: PRIMARY DELAYED CLOSURE;  Surgeon: Musa Hernandez DPM;  [facility];  Service: Podiatry    TOE AMPUTATION Right     partial great toe    VASECTOMY         Meds/Allergies:    (Not in a hospital admission)      Allergies   Allergen Reactions    Simvastatin Myalgia    Itraconazole Other (See Comments)     Pt denies knowledge of allergy           Social History:    Social History     Marital Status: /Civil Union    Substance Use History:   Social History     Substance and Sexual Activity   Alcohol Use Yes    Frequency: 2-4 times a month    Drinks per session: 1 or 2    Comment: socially     Social History     Tobacco Use   Smoking Status Former Smoker    Types: Cigarettes    Quit date: 1988    Years since quittin 2   Smokeless Tobacco Former User     Social History     Substance and Sexual Activity   Drug Use No       Family History:    Family History   Problem Relation Age of Onset    Diabetes Father         mellitus         OBJECTIVE:    First Vitals:   Blood Pressure: 136/81 (21)  Pulse: 84 (21)  Temperature: (!) 103 °F (39 4 °C) (21)  Temp Source: Oral (21)  Respirations: (!) 26 (21)  Weight - Scale: 109 kg (241 lb) (21)  SpO2: 97 % (21)    Current Vitals:   Blood Pressure: 121/58 (21 190)  Pulse: (!) 126 (21)  Temperature: (!) 103 °F (39 4 °C) (21)  Temp Source: Oral (21)  Respirations: 18 (21)  Weight - Scale: 109 kg (241 lb) (21)  SpO2: 97 % (21)      Physical Exam    General Appearance: Alert, cooperative, no distress  HEENT: Head normocephalic, atraumatic, without obvious abnormality  Heart: Normal rate and rhythm  No murmurs or gallops noted  Lungs: Non-labored breathing  No respiratory distress  No wheezing, rhonchi, or rales  Abdomen:  Soft, nontender, without distension  Psychiatric: AAOx3  Lower Extremity:  Vascular:   Pedal pulses are present Right AT and PT dopplerable  Right DP perforating branch unable to be dopplered  No doppler signals heard in digits  CRT >3 seconds at the 1st and 2nd digits  +4/4 edema noted at right global foot  No edema noted in legs bilaterally  Pedal hair is present  Skin temperature is warm to right foot and ankle    Musculoskeletal:  MMT is 5/5 in all muscle compartments left foot  Able to move right foot lesser digits, hallux and 2nd digits unable to move  ROM at the 1st MPJ and ankle joint are limited left leg with the leg extended  Right leg MSK exam limited due to external fixator Pain on palpation of right dorsal foot  Dermatological:   Lower extremity wounds as noted below:    incisions appear well coapted with sutures intact  No draiange or purulence through sutures  Edema with turbid serous drainage of right forefoot  Erythema with warmth of right foot  1st and second digits dusky with delayed capillary refill  Bulla noted in plantar arch with hemorrhagic base  Neurological:  Gross sensation is intact  Protective sensation is intact  Patient Reports numbness and/or paresthesias      Clinical Images 02/16/21:                              Lab Results:   Admission on 02/16/2021   Component Date Value    SARS-CoV-2 02/16/2021 Negative     INFLUENZA A PCR 02/16/2021 Negative     INFLUENZA B PCR 02/16/2021 Negative     RSV PCR 02/16/2021 Negative     WBC 02/16/2021 14 65*    RBC 02/16/2021 3 96     Hemoglobin 02/16/2021 12 3     Hematocrit 02/16/2021 37 3     MCV 02/16/2021 94     MCH 02/16/2021 31 1     MCHC 02/16/2021 33 0     RDW 02/16/2021 13 8     MPV 02/16/2021 11 3     Platelets 66/10/6721 186     nRBC 02/16/2021 0     Neutrophils Relative 02/16/2021 88*    Immat GRANS % 02/16/2021 1     Lymphocytes Relative 02/16/2021 5*    Monocytes Relative 02/16/2021 6     Eosinophils Relative 02/16/2021 0     Basophils Relative 02/16/2021 0     Neutrophils Absolute 02/16/2021 12 97*    Immature Grans Absolute 02/16/2021 0 07     Lymphocytes Absolute 02/16/2021 0 76     Monocytes Absolute 02/16/2021 0 82     Eosinophils Absolute 02/16/2021 0 01     Basophils Absolute 02/16/2021 0 02     Sodium 02/16/2021 137     Potassium 02/16/2021 3 7     Chloride 02/16/2021 106     CO2 02/16/2021 22     ANION GAP 02/16/2021 9     BUN 02/16/2021 12     Creatinine 02/16/2021 1 05     Glucose 02/16/2021 175*    Calcium 02/16/2021 8 8     Corrected Calcium 02/16/2021 9 7     AST 02/16/2021 52*    ALT 02/16/2021 61     Alkaline Phosphatase 02/16/2021 120*    Total Protein 02/16/2021 7 1     Albumin 02/16/2021 2 9*    Total Bilirubin 02/16/2021 0 77     eGFR 02/16/2021 73     LACTIC ACID 02/16/2021 2 1*    Troponin I 02/16/2021 <0 02     Protime 02/16/2021 17 9*    INR 02/16/2021 1 48*    Procalcitonin 02/16/2021 1 08*    PTT 02/16/2021 37     Color, UA 02/16/2021 Yellow     Clarity, UA 02/16/2021 Clear     Specific Gravity, UA 02/16/2021 1 024     pH, UA 02/16/2021 6 0     Leukocytes, UA 02/16/2021 Elevated glucose may cause decreased leukocyte values  See urine microscopic for [facility] result/*    Nitrite, UA 02/16/2021 Negative     Protein, UA 02/16/2021 100 (2+)*    Glucose, UA 02/16/2021 >=1000 (1%)*    Ketones, UA 02/16/2021 Trace*    Urobilinogen, UA 02/16/2021 1 0     Bilirubin, UA 02/16/2021 Negative     Blood, UA 02/16/2021 Trace*       Cultures:            Imaging: I have personally reviewed pertinent films in PACS  No results found  Pathology, and Other Studies: I have personally reviewed pertinent reports        Code Status: Level 1 - Full Code

## 2021-02-17 NOTE — PLAN OF CARE
Problem: Potential for Falls  Goal: Patient will remain free of falls  Description: INTERVENTIONS:  - Assess patient frequently for physical needs  -  Identify cognitive and physical deficits and behaviors that affect risk of falls    -  [place] fall precautions as indicated by assessment   - Educate patient/family on patient safety including physical limitations  - Instruct patient to call for assistance with activity based on assessment  - Modify environment to reduce risk of injury  - Consider OT/PT consult to assist with strengthening/mobility  Outcome: Progressing     Problem: Prexisting or High Potential for Compromised Skin Integrity  Goal: Skin integrity is maintained or improved  Description: INTERVENTIONS:  - Identify patients at risk for skin breakdown  - Assess and monitor skin integrity  - Assess and monitor nutrition and hydration status  - Monitor labs   - Assess for incontinence   - Turn and reposition patient  - Assist with mobility/ambulation  - Relieve pressure over bony prominences  - Avoid friction and shearing  - Provide appropriate hygiene as needed including keeping skin clean and dry  - Evaluate need for skin moisturizer/barrier cream  - Collaborate with interdisciplinary team   - Patient/family teaching  - Consider wound care consult   Outcome: Progressing     Problem: PAIN - ADULT  Goal: Verbalizes/displays adequate comfort level or baseline comfort level  Description: Interventions:  - Encourage patient to monitor pain and request assistance  - Assess pain using appropriate pain scale  - Administer analgesics based on type and severity of pain and evaluate response  - Implement non-pharmacological measures as appropriate and evaluate response  - Consider cultural and social influences on pain and pain management  - Notify physician/advanced practitioner if interventions unsuccessful or patient reports new pain  Outcome: Progressing     Problem: INFECTION - ADULT  Goal: Absence or prevention of progression during hospitalization  Description: INTERVENTIONS:  - Assess and monitor for signs and symptoms of infection  - Monitor lab/diagnostic results  - Monitor all insertion sites, i e  indwelling lines, tubes, and drains  - Monitor endotracheal if appropriate and nasal secretions for changes in amount and color  - [place] appropriate cooling/warming therapies per order  - Administer medications as ordered  - Instruct and encourage patient and family to use good hand hygiene technique  - Identify and instruct in appropriate isolation precautions for identified infection/condition  Outcome: Progressing  Goal: Absence of fever/infection during neutropenic period  Description: INTERVENTIONS:  - Monitor WBC    Outcome: Progressing     Problem: SAFETY ADULT  Goal: Patient will remain free of falls  Description: INTERVENTIONS:  - Assess patient frequently for physical needs  -  Identify cognitive and physical deficits and behaviors that affect risk of falls    -  [place] fall precautions as indicated by assessment   - Educate patient/family on patient safety including physical limitations  - Instruct patient to call for assistance with activity based on assessment  - Modify environment to reduce risk of injury  - Consider OT/PT consult to assist with strengthening/mobility  Outcome: Progressing  Goal: Maintain or return to baseline ADL function  Description: INTERVENTIONS:  -  Assess patient's ability to carry out ADLs; assess patient's baseline for ADL function and identify physical deficits which impact ability to perform ADLs (bathing, care of mouth/teeth, toileting, grooming, dressing, etc )  - Assess/evaluate cause of self-care deficits   - Assess range of motion  - Assess patient's mobility; develop plan if impaired  - Assess patient's need for assistive devices and provide as appropriate  - Encourage maximum independence but intervene and supervise when necessary  - Involve family in performance of ADLs  - Assess for home care needs following discharge   - Consider OT consult to assist with ADL evaluation and planning for discharge  - Provide patient education as appropriate  Outcome: Progressing  Goal: Maintain or return mobility status to optimal level  Description: INTERVENTIONS:  - Assess patient's baseline mobility status (ambulation, transfers, stairs, etc )    - Identify cognitive and physical deficits and behaviors that affect mobility  - Identify mobility aids required to assist with transfers and/or ambulation (gait belt, sit-to-stand, lift, walker, cane, etc )  - [place] fall precautions as indicated by assessment  - Record patient progress and toleration of activity level on Mobility SBAR; progress patient to next Phase/Stage  - Instruct patient to call for assistance with activity based on assessment  - Consider rehabilitation consult to assist with strengthening/weightbearing, etc   Outcome: Progressing     Problem: DISCHARGE PLANNING  Goal: Discharge to home or other facility with appropriate resources  Description: INTERVENTIONS:  - Identify barriers to discharge w/patient and caregiver  - Arrange for needed discharge resources and transportation as appropriate  - Identify discharge learning needs (meds, wound care, etc )  - Arrange for interpretive services to assist at discharge as needed  - Refer to Case Management Department for coordinating discharge planning if the patient needs post-hospital services based on physician/advanced practitioner order or complex needs related to functional status, cognitive ability, or social support system  Outcome: Progressing

## 2021-02-18 LAB
ANION GAP SERPL CALCULATED.3IONS-SCNC: 6 MMOL/L (ref 4–13)
ATRIAL RATE: 147 BPM
BUN SERPL-MCNC: 11 MG/DL (ref 5–25)
CALCIUM SERPL-MCNC: 8.6 MG/DL (ref 8.3–10.1)
CHLORIDE SERPL-SCNC: 110 MMOL/L (ref 100–108)
CO2 SERPL-SCNC: 26 MMOL/L (ref 21–32)
CREAT SERPL-MCNC: 0.87 MG/DL (ref 0.6–1.3)
ERYTHROCYTE [DISTWIDTH] IN BLOOD BY AUTOMATED COUNT: 14.8 % (ref 11.6–15.1)
GFR SERPL CREATININE-BSD FRML MDRD: 89 ML/MIN/1.73SQ M
GLUCOSE SERPL-MCNC: 124 MG/DL (ref 65–140)
GLUCOSE SERPL-MCNC: 126 MG/DL (ref 65–140)
GLUCOSE SERPL-MCNC: 133 MG/DL (ref 65–140)
GLUCOSE SERPL-MCNC: 190 MG/DL (ref 65–140)
HCT VFR BLD AUTO: 33.5 % (ref 36.5–49.3)
HGB BLD-MCNC: 10.6 G/DL (ref 12–17)
INR PPP: 1.36 (ref 0.84–1.19)
MCH RBC QN AUTO: 30.6 PG (ref 26.8–34.3)
MCHC RBC AUTO-ENTMCNC: 31.6 G/DL (ref 31.4–37.4)
MCV RBC AUTO: 97 FL (ref 82–98)
PLATELET # BLD AUTO: 190 THOUSANDS/UL (ref 149–390)
PMV BLD AUTO: 10.6 FL (ref 8.9–12.7)
POTASSIUM SERPL-SCNC: 3.4 MMOL/L (ref 3.5–5.3)
PROTHROMBIN TIME: 16.7 SECONDS (ref 11.6–14.5)
QRS AXIS: 41 DEGREES
QRSD INTERVAL: 106 MS
QT INTERVAL: 314 MS
QTC INTERVAL: 449 MS
RBC # BLD AUTO: 3.46 MILLION/UL (ref 3.88–5.62)
SODIUM SERPL-SCNC: 142 MMOL/L (ref 136–145)
T WAVE AXIS: 11 DEGREES
VANCOMYCIN TROUGH SERPL-MCNC: 9 UG/ML (ref 10–20)
VENTRICULAR RATE: 123 BPM
WBC # BLD AUTO: 8.26 THOUSAND/UL (ref 4.31–10.16)

## 2021-02-18 PROCEDURE — 80202 ASSAY OF VANCOMYCIN: CPT | Performed by: STUDENT IN AN ORGANIZED HEALTH CARE EDUCATION/TRAINING PROGRAM

## 2021-02-18 PROCEDURE — 85610 PROTHROMBIN TIME: CPT | Performed by: STUDENT IN AN ORGANIZED HEALTH CARE EDUCATION/TRAINING PROGRAM

## 2021-02-18 PROCEDURE — 80048 BASIC METABOLIC PNL TOTAL CA: CPT | Performed by: STUDENT IN AN ORGANIZED HEALTH CARE EDUCATION/TRAINING PROGRAM

## 2021-02-18 PROCEDURE — 87081 CULTURE SCREEN ONLY: CPT | Performed by: STUDENT IN AN ORGANIZED HEALTH CARE EDUCATION/TRAINING PROGRAM

## 2021-02-18 PROCEDURE — 82948 REAGENT STRIP/BLOOD GLUCOSE: CPT

## 2021-02-18 PROCEDURE — 85027 COMPLETE CBC AUTOMATED: CPT | Performed by: STUDENT IN AN ORGANIZED HEALTH CARE EDUCATION/TRAINING PROGRAM

## 2021-02-18 PROCEDURE — 93010 ELECTROCARDIOGRAM REPORT: CPT | Performed by: INTERNAL MEDICINE

## 2021-02-18 PROCEDURE — 99024 POSTOP FOLLOW-UP VISIT: CPT | Performed by: PODIATRIST

## 2021-02-18 RX ORDER — OXYCODONE HYDROCHLORIDE 5 MG/1
5 TABLET ORAL EVERY 4 HOURS PRN
Status: DISCONTINUED | OUTPATIENT
Start: 2021-02-18 | End: 2021-03-10 | Stop reason: HOSPADM

## 2021-02-18 RX ORDER — POTASSIUM CHLORIDE 20 MEQ/1
40 TABLET, EXTENDED RELEASE ORAL 2 TIMES DAILY
Status: COMPLETED | OUTPATIENT
Start: 2021-02-18 | End: 2021-02-18

## 2021-02-18 RX ORDER — METOPROLOL TARTRATE 5 MG/5ML
5 INJECTION INTRAVENOUS ONCE
Status: COMPLETED | OUTPATIENT
Start: 2021-02-18 | End: 2021-02-18

## 2021-02-18 RX ADMIN — CEFEPIME HYDROCHLORIDE 2000 MG: 2 INJECTION, POWDER, FOR SOLUTION INTRAVENOUS at 00:36

## 2021-02-18 RX ADMIN — APIXABAN 5 MG: 5 TABLET, FILM COATED ORAL at 08:17

## 2021-02-18 RX ADMIN — DILTIAZEM HYDROCHLORIDE 120 MG: 60 TABLET, FILM COATED ORAL at 17:42

## 2021-02-18 RX ADMIN — VANCOMYCIN HYDROCHLORIDE 1250 MG: 10 INJECTION, POWDER, LYOPHILIZED, FOR SOLUTION INTRAVENOUS at 01:38

## 2021-02-18 RX ADMIN — DILTIAZEM HYDROCHLORIDE 120 MG: 60 TABLET, FILM COATED ORAL at 08:49

## 2021-02-18 RX ADMIN — ATORVASTATIN CALCIUM 40 MG: 40 TABLET, FILM COATED ORAL at 08:17

## 2021-02-18 RX ADMIN — SODIUM CHLORIDE 75 ML/HR: 0.9 INJECTION, SOLUTION INTRAVENOUS at 21:21

## 2021-02-18 RX ADMIN — METRONIDAZOLE 500 MG: 500 TABLET ORAL at 21:17

## 2021-02-18 RX ADMIN — METRONIDAZOLE 500 MG: 500 TABLET ORAL at 13:27

## 2021-02-18 RX ADMIN — OXYCODONE HYDROCHLORIDE 10 MG: 10 TABLET ORAL at 20:21

## 2021-02-18 RX ADMIN — METOROPROLOL TARTRATE 5 MG: 5 INJECTION, SOLUTION INTRAVENOUS at 09:59

## 2021-02-18 RX ADMIN — CEFEPIME HYDROCHLORIDE 2000 MG: 2 INJECTION, POWDER, FOR SOLUTION INTRAVENOUS at 17:42

## 2021-02-18 RX ADMIN — ACETAMINOPHEN 650 MG: 325 TABLET ORAL at 13:26

## 2021-02-18 RX ADMIN — POTASSIUM CHLORIDE 40 MEQ: 1500 TABLET, EXTENDED RELEASE ORAL at 08:17

## 2021-02-18 RX ADMIN — INSULIN LISPRO 2 UNITS: 100 INJECTION, SOLUTION INTRAVENOUS; SUBCUTANEOUS at 12:02

## 2021-02-18 RX ADMIN — SENNOSIDES 8.6 MG: 8.6 TABLET, FILM COATED ORAL at 21:17

## 2021-02-18 RX ADMIN — CEFEPIME HYDROCHLORIDE 2000 MG: 2 INJECTION, POWDER, FOR SOLUTION INTRAVENOUS at 08:33

## 2021-02-18 RX ADMIN — METRONIDAZOLE 500 MG: 500 TABLET ORAL at 05:17

## 2021-02-18 RX ADMIN — POLYETHYLENE GLYCOL 3350 17 G: 17 POWDER, FOR SOLUTION ORAL at 08:17

## 2021-02-18 RX ADMIN — POTASSIUM CHLORIDE 40 MEQ: 1500 TABLET, EXTENDED RELEASE ORAL at 17:42

## 2021-02-18 RX ADMIN — VANCOMYCIN HYDROCHLORIDE 1250 MG: 10 INJECTION, POWDER, LYOPHILIZED, FOR SOLUTION INTRAVENOUS at 21:20

## 2021-02-18 RX ADMIN — LISINOPRIL 40 MG: 20 TABLET ORAL at 08:17

## 2021-02-18 RX ADMIN — APIXABAN 5 MG: 5 TABLET, FILM COATED ORAL at 17:42

## 2021-02-18 RX ADMIN — SODIUM CHLORIDE 75 ML/HR: 0.9 INJECTION, SOLUTION INTRAVENOUS at 01:43

## 2021-02-18 RX ADMIN — VANCOMYCIN HYDROCHLORIDE 1250 MG: 10 INJECTION, POWDER, LYOPHILIZED, FOR SOLUTION INTRAVENOUS at 13:42

## 2021-02-18 RX ADMIN — INSULIN LISPRO 1 UNITS: 100 INJECTION, SOLUTION INTRAVENOUS; SUBCUTANEOUS at 01:27

## 2021-02-18 NOTE — ASSESSMENT & PLAN NOTE
Patient has longstanding history of atrial fibrillation on Cardizem 120 BID for rate control and Eliquis for anticoagulation   Does not follow with cardiology outpatient  - Continue eliquis   - no indication to trend INR unless useful to surgical team  -currently rate controlled  -increase metoprolol to 50 mg q 12  - metoprolol 5mg IV q6 PRN for HR >120  -  monitor on telemetry

## 2021-02-18 NOTE — PROGRESS NOTES
Vancomycin IV Pharmacy-to-Dose Consultation    Jose Martinez  is a 78 y o  male who is currently receiving Vancomycin IV with management by the Pharmacy Consult service  Relevant clinical data and objective history reviewed:  Temp Readings from Last 3 Encounters:   02/18/21 98 9 °F (37 2 °C)   02/13/21 98 °F (36 7 °C) (Temporal)   01/29/21 98 3 °F (36 8 °C)     /79   Pulse 81   Temp 98 9 °F (37 2 °C)   Resp 17   Ht 6' 4" (1 93 m)   Wt 109 kg (240 lb 15 4 oz)   SpO2 99%   BMI 29 33 kg/m²     I/O last 3 completed shifts:   In: 2161 3 [P O :480; I V :1181 3; IV Piggyback:500]  Out: 2250 [Urine:2250]    Lab Results   Component Value Date/Time    BUN 11 02/18/2021 05:21 AM    BUN 20 05/22/2018 04:38 PM    WBC 8 26 02/18/2021 05:21 AM    WBC 7 4 05/22/2018 04:38 PM    HGB 10 6 (L) 02/18/2021 05:21 AM    HGB 14 5 05/22/2018 04:38 PM    HCT 33 5 (L) 02/18/2021 05:21 AM    HCT 44 1 05/22/2018 04:38 PM    MCV 97 02/18/2021 05:21 AM    MCV 95 05/22/2018 04:38 PM     02/18/2021 05:21 AM     05/22/2018 04:38 PM     Creatinine   Date Value Ref Range Status   02/18/2021 0 87 0 60 - 1 30 mg/dL Final     Comment:     Standardized to IDMS reference method   02/17/2021 1 02 0 60 - 1 30 mg/dL Final     Comment:     Standardized to IDMS reference method     Vancomycin Tr   Date Value Ref Range Status   02/18/2021 9 0 (L) 10 0 - 20 0 ug/mL Final         Vancomycin Assessment:  Indication: skin-soft tissue infection    Status: stable  Micro: MRSA culture in progress, wound culture growing enterobacter cloacae, blood cultures no growth at 24H, COVID negative  Procalcitonin: 1 08 (2/16)  Renal Function: Scr  0 87, UOP 0 4 mL/kg/hr  Potential Nephrotoxicity Factors:   Medications: lisinopril  Patient-Factors: none  Days of Therapy: 3  Current Dose: vancomycin 1250 mg Q12H (last dose: 2/18 at 1342)  Goal Trough: 15-20 (appropriate for most indications)   Goal AUC(24h): 400-600  Last Level: trough 9 on 2/18 Vancomycin Plan:  New Dosing: change to vancomycin 1250 mg Q8H (next dose: 2/18 at 2130)  Predicted Trough / AUC: 16 8 / 541  Next Level: trough 2/19 at 1300  Renal Function Monitoring: Scr, UOP      Pharmacy will continue to follow closely for s/sx of nephrotoxicity, infusion reactions and appropriateness of therapy  BMP and CBC will be ordered per protocol  We will continue to follow the patient's culture results and clinical progress daily       My Mcnair, PGY-1 Pharmacy Resident

## 2021-02-18 NOTE — PROGRESS NOTES
INTERNAL MEDICINE RESIDENCY PROGRESS NOTE     Name: Lauren Gonzalez  Age & Sex: 78 y o  male   MRN: [id number]  Unit/Bed#: [facility] 624-01   Encounter: [id number]  Team: SOD Team B     PATIENT INFORMATION     Name: Lauren Gonzalez  Age & Sex: 78 y o  male   MRN: [id number]  Hospital Stay Days: 2    ASSESSMENT/PLAN     Principal Problem:    Sepsis [facility])  Active Problems:    Hyperlipidemia    Type 2 diabetes mellitus with diabetic neuropathy (HCC)    Atrial fibrillation with RVR (HCC)    Essential hypertension    Congenital talipes equinovarus, right foot    Post-operative state    Cellulitis of extremity    Abscess    Drug-induced constipation      Drug-induced constipation  Assessment & Plan  Improving  Continue senna, miralax    Essential hypertension  Assessment & Plan  - patient has history of longstanding hypertension on lisinopril 40mg daily and Cardizem 120mg BID  - continue lisinopril and Cardizem  - trend BMP     Atrial fibrillation with RVR ([address] )  Assessment & Plan  Patient has longstanding history of atrial fibrillation on Cardizem 120 BID for rate control and Eliquis for anticoagulation   Does not follow with cardiology outpatient  - Continue eliquis   - no indication to trend INR unless useful to surgical team  - continue diltiazem for rate control   - given lopressor 5mg x 1 this AM for RVR   - may increase to 180 BID  - telemetry given continued RVR    Type 2 diabetes mellitus with diabetic neuropathy [facility])  Assessment & Plan  Lab Results   Component Value Date    HGBA1C 7 8 (H) 12/23/2020       Recent Labs     02/17/21  1711 02/17/21  2344 02/18/21  0522 02/18/21  1137   POCGLU 146* 153* 124 190*       Blood Sugar Average: Last 72 hrs:  (P) 153 25     On sitagliptin, metformin, and glimepiride outpatient: all held on admission  SSI ordered  Continue to monitor glucoses in the setting of acute infection and as PO intake improves    Hyperlipidemia  Assessment & Plan  Continue atorvastatin 40mg    * Sepsis [facility])  Assessment & Plan  Due to infected hardware, now status post removal and washout, POD#1  - blood cultures negative x 24 hours  - ID consulted  - vancomycin, cefepime, flagyl      Disposition: Per primary  Will continue to follow  Monitor on telemetry given ongoing Afib with RVR    SUBJECTIVE     Patient seen and examined  No acute events overnight  He denies chest pain or palpitations; he reports that he does sometimes feel RVR but not currently, despite HR around 120  He denies further fever or chills  He reports BM within the past 2 days  OBJECTIVE     Vitals:    21 2234 21 0243 21 0700 21 1102   BP: 108/71 116/74 120/71 125/79   BP Location:       Pulse: 85 97 89 81   Resp:    Temp: 99 7 °F (37 6 °C)  99 °F (37 2 °C) 98 9 °F (37 2 °C)   TempSrc: Oral      SpO2: 97% 99% 99% 99%   Weight:       Height:          Temperature:   Temp (24hrs), Av °F (37 8 °C), Min:98 9 °F (37 2 °C), Max:102 1 °F (38 9 °C)    Temperature: 98 9 °F (37 2 °C)  Intake & Output:  I/O        07 -  0700  07 -  0700  07 -  0700    P  O   480 240    I V  (mL/kg) 681 3 (6 3) 500 (4 6)     IV Piggyback 1650  50    Total Intake(mL/kg) 2331 3 (21 4) 980 (9) 290 (2 7)    Urine (mL/kg/hr) 1100 1150 (0 4) 700 (1 2)    Stool   0    Total Output 1100 1150 700    Net +1231 3 -170 -410           Unmeasured Urine Occurrence   1 x    Unmeasured Stool Occurrence   0 x        Weights:   IBW: 86 8 kg    Body mass index is 29 33 kg/m²  Weight (last 2 days)     Date/Time   Weight    21 2200   109 (240 96)    21 1637   109 (241)            Physical Exam  Constitutional:       General: He is not in acute distress  Appearance: He is well-developed  HENT:      Head: Normocephalic and atraumatic  Eyes:      General: No scleral icterus  Conjunctiva/sclera: Conjunctivae normal    Cardiovascular:      Rate and Rhythm: Tachycardia present  Rhythm irregular  Heart sounds: Normal heart sounds  No murmur  Pulmonary:      Effort: Pulmonary effort is normal       Breath sounds: Normal breath sounds  No wheezing or rales  Abdominal:      General: Bowel sounds are normal       Palpations: Abdomen is soft  Tenderness: There is no abdominal tenderness  Musculoskeletal:         General: No deformity  Comments: Right foot in dressing, noted dusky great toe   Skin:     General: Skin is warm and dry  Neurological:      General: No focal deficit present  Mental Status: He is alert and oriented to person, place, and time  Psychiatric:         Mood and Affect: Mood normal          Behavior: Behavior normal        LABORATORY DATA     Labs: I have personally reviewed pertinent reports  Results from last 7 days   Lab Units 02/18/21  0521 02/17/21  0752 02/16/21  1648   WBC Thousand/uL 8 26 8 58 14 65*   HEMOGLOBIN g/dL 10 6* 11 1* 12 3   HEMATOCRIT % 33 5* 34 6* 37 3   PLATELETS Thousands/uL 190 178 186   NEUTROS PCT %  --   --  88*   MONOS PCT %  --   --  6      Results from last 7 days   Lab Units 02/18/21  0521 02/17/21 0752 02/16/21  1648   POTASSIUM mmol/L 3 4* 3 9 3 7   CHLORIDE mmol/L 110* 111* 106   CO2 mmol/L 26 22 22   BUN mg/dL 11 10 12   CREATININE mg/dL 0 87 1 02 1 05   CALCIUM mg/dL 8 6 8 9 8 8   ALK PHOS U/L  --   --  120*   ALT U/L  --   --  61   AST U/L  --   --  52*              Results from last 7 days   Lab Units 02/18/21  0521 02/17/21  0752 02/16/21  1648   INR  1 36* 1 47* 1 48*   PTT seconds  --   --  37     Results from last 7 days   Lab Units 02/16/21  1855   LACTIC ACID mmol/L 1 2     Results from last 7 days   Lab Units 02/16/21  1648   TROPONIN I ng/mL <0 02       IMAGING & DIAGNOSTIC TESTING     Radiology Results: I have personally reviewed pertinent reports  Xr Foot 2 Vw Right    Result Date: 2/17/2021  Impression: Findings suspicious for infection involving the 4th and 5th tarsometatarsal joints, less conspicuous previously  Status post surgery Workstation performed: [id number]     Xr Foot Right 3+ Views    Result Date: 2/18/2021  Impression: Removal of the external fixation device  Otherwise stable alignment of the post surgical changes  Workstation performed: [id number]     Other Diagnostic Testing: I have personally reviewed pertinent reports  ACTIVE MEDICATIONS     Current Facility-Administered Medications   Medication Dose Route Frequency    acetaminophen (TYLENOL) tablet 650 mg  650 mg Oral Q6H PRN    apixaban (ELIQUIS) tablet 5 mg  5 mg Oral BID    atorvastatin (LIPITOR) tablet 40 mg  40 mg Oral Daily    cefepime (MAXIPIME) 2 g/50 mL dextrose IVPB  2,000 mg Intravenous Q8H    diltiazem (CARDIZEM) tablet 120 mg  120 mg Oral BID    HYDROmorphone (DILAUDID) injection 0 5 mg  0 5 mg Intravenous Q4H PRN    insulin lispro (HumaLOG) 100 units/mL subcutaneous injection 1-6 Units  1-6 Units Subcutaneous Q6H    lisinopril (ZESTRIL) tablet 40 mg  40 mg Oral Daily    metroNIDAZOLE (FLAGYL) tablet 500 mg  500 mg Oral Q8H [address]    oxyCODONE (ROXICODONE) immediate release tablet 10 mg  10 mg Oral Q4H PRN    oxyCODONE (ROXICODONE) IR tablet 5 mg  5 mg Oral Q4H PRN    polyethylene glycol (MIRALAX) packet 17 g  17 g Oral Daily    potassium chloride (K-DUR,KLOR-CON) CR tablet 40 mEq  40 mEq Oral BID    senna (SENOKOT) tablet 8 6 mg  1 tablet Oral HS    sodium chloride 0 9 % infusion  75 mL/hr Intravenous Continuous    vancomycin (VANCOCIN) 1,250 mg in sodium chloride 0 9 % 250 mL IVPB  15 mg/kg (Ideal) Intravenous Q12H       VTE Pharmacologic Prophylaxis: Reason for no pharmacologic prophylaxis Eliquis  VTE Mechanical Prophylaxis: sequential compression device    Portions of the record may have been created with voice recognition software  Occasional wrong word or "sound a like" substitutions may have occurred due to the inherent limitations of voice recognition software    Read the chart carefully and recognize, using context, where substitutions have occurred   ==  Chuck Durbin,   [address]  Internal Medicine Residency PGY-3

## 2021-02-18 NOTE — ASSESSMENT & PLAN NOTE
On sitagliptin, metformin, and glimepiride outpatient: all held on admission  Not previously on insulin  SSI ordered, some hyperglycemia noted this morning  Fasting blood sugar 180s to 200s  - increase Lantus to 15 units q h s    Goal blood glucose 140-180  Continue to monitor, likely elevated in the setting of acute illness

## 2021-02-18 NOTE — ASSESSMENT & PLAN NOTE
Due to infected hardware/hematoma of R foot, now status post removal and washout, POD#4  - per podiatry: monitor for demarcation prior to amputation, scheduled for surgery today  - blood cultures negative x 72 hours  - ID consulted, appreciated  - Zosyn as per Infectious Disease

## 2021-02-18 NOTE — ASSESSMENT & PLAN NOTE
- patient has history of longstanding hypertension on lisinopril 40mg daily and Cardizem 120mg BID  - continue lisinopril  - cardizem to change to metoprolol as detailed elsewhere  - trend BMP

## 2021-02-18 NOTE — PROGRESS NOTES
Podiatry - Progress Note  Patient: Wing Pineda  78 y o  male   MRN: [id number]  PCP: Brittany Alarcon MD  Unit/Bed#: [facility] 941-79 Encounter: [id number]  Date Of Visit: 02/18/21    ASSESSMENT:    Wing Pineda  is a 78 y o  male with:    1  Right foot hematoma  - S/p exfix removal, I&D, fasciotomy (DOS 2/17/21)  2  Congenital talipes equinovarus right foot   - s/p midfoot arthrodesis, exfix application (DOS 5/51/82)  3  T2Dm, last A1C 7 8 %  4  HTN  5  Altered mental status  6  Constipation  7  atrial fibrillation  8  Sepsis on admission    PLAN:    · Complete dressing change today  Right hallux continues to appear dusky with prolonged CFT, however the remaining foot and digits appears perfused with dopplerable flow and viable at this time  We will continue to monitor through the weekend for any areas of demarcation and proceed from there  · Lengthy conversation had over the phone with patient and his wife about expectations going forward including the possibility of hallux amputation  Both the patient and his wife understand and are amenable to above plan  · Dr Richelle Goode present for dressing change, discussed plan for continued antibiotics and follow-up of intra-op cultures  · Elevation on green foam wedges or pillows when non-ambulatory  · Appreciate consulting services for recommendations and management  Antibiotics started: vancomycin, metronidazole and cefepime  Pharmacologic VTE Prophylaxis: Eiliquis  Mechanical VTE Prophylaxis: sequential compression device   Weightbearing status: Strict NWB to RLE    Disposition:  Patient requires continued stay for reasons stated above  SUBJECTIVE:     The patient was seen, evaluated, and assessed at bedside today  The patient was awake, alert, and in no acute distress  No acute events overnight  The patient reports feeling better and "not constipated " Patient denies N/V/F/chills/SOB/CP        OBJECTIVE:     Vitals:   /79   Pulse 81   Temp 98 9 °F (37 2 °C)   Resp 17   Ht 6' 4" (1 93 m)   Wt 109 kg (240 lb 15 4 oz)   SpO2 99%   BMI 29 33 kg/m²     Temp (24hrs), Av °F (37 8 °C), Min:98 9 °F (37 2 °C), Max:102 1 °F (38 9 °C)      Physical Exam:     General: Alert, cooperative and no distress  Lungs: Non labored breathing  Abdomen: Soft, non-tender  Lower extremity exam:  Cardiovascular status at baseline  Neurological status at baseline  Musculoskeletal status at baseline  No calf tenderness noted  Inicisions over the dorso-medial, central, and lateral aspects of the foot as well as the plantar medial arch  Underlying soft tissue appears fibrogranular healthy and viable at this time  Surrounding skin appears indurated and ecchymotic but is improved in overall color and appearance from pre-operatively  The right hallux appears dusky, cool with prolonged CFT  Biphasic dopplerable pulses noted of the right AT, DP and , PT, peroneal, and 2nd/3rd digital arteries  Clinical Images 21:                      Additional Data:     Labs:    Results from last 7 days   Lab Units 21  0521  21  1648   WBC Thousand/uL 8 26   < > 14 65*   HEMOGLOBIN g/dL 10 6*   < > 12 3   HEMATOCRIT % 33 5*   < > 37 3   PLATELETS Thousands/uL 190   < > 186   NEUTROS PCT %  --   --  88*   LYMPHS PCT %  --   --  5*   MONOS PCT %  --   --  6   EOS PCT %  --   --  0    < > = values in this interval not displayed  Results from last 7 days   Lab Units 21  0521  21  1648   POTASSIUM mmol/L 3 4*   < > 3 7   CHLORIDE mmol/L 110*   < > 106   CO2 mmol/L 26   < > 22   BUN mg/dL 11   < > 12   CREATININE mg/dL 0 87   < > 1 05   CALCIUM mg/dL 8 6   < > 8 8   ALK PHOS U/L  --   --  120*   ALT U/L  --   --  61   AST U/L  --   --  52*    < > = values in this interval not displayed  Results from last 7 days   Lab Units 21  0521   INR  1 36*       * I Have Reviewed All Lab Data Listed Above      Recent Cultures (last 7 days):     Results from last 7 days   Lab Units 02/17/21  1636 02/17/21  1634 02/16/21  1648 02/16/21  1630   BLOOD CULTURE   --   --  No Growth at 24 hrs  No Growth at 24 hrs  GRAM STAIN RESULT  1+ Polys*  1+ Gram negative rods* 1+ Polys  No bacteria seen  --   --    WOUND CULTURE  3+ Growth of Non lactose fermenting gram negative vinita* 2+ Growth of Non lactose fermenting gram negative vinita*  --   --      Results from last 7 days   Lab Units 02/17/21  1636 02/17/21  1634   ANAEROBIC CULTURE  Culture results to follow  Culture results to follow  Imaging: I have personally reviewed pertinent films in PACS  EKG, Pathology, and Other Studies: I have personally reviewed pertinent reports  ** Please Note: Portions of the record may have been created with voice recognition software  Occasional wrong word or "sound a like" substitutions may have occurred due to the inherent limitations of voice recognition software  Read the chart carefully and recognize, using context, where substitutions have occurred   **

## 2021-02-19 LAB
ANION GAP SERPL CALCULATED.3IONS-SCNC: 7 MMOL/L (ref 4–13)
BACTERIA SPEC ANAEROBE CULT: NORMAL
BACTERIA SPEC ANAEROBE CULT: NORMAL
BUN SERPL-MCNC: 11 MG/DL (ref 5–25)
CALCIUM SERPL-MCNC: 8.5 MG/DL (ref 8.3–10.1)
CHLORIDE SERPL-SCNC: 109 MMOL/L (ref 100–108)
CO2 SERPL-SCNC: 24 MMOL/L (ref 21–32)
CREAT SERPL-MCNC: 0.74 MG/DL (ref 0.6–1.3)
ERYTHROCYTE [DISTWIDTH] IN BLOOD BY AUTOMATED COUNT: 14.8 % (ref 11.6–15.1)
GFR SERPL CREATININE-BSD FRML MDRD: 95 ML/MIN/1.73SQ M
GLUCOSE SERPL-MCNC: 133 MG/DL (ref 65–140)
GLUCOSE SERPL-MCNC: 135 MG/DL (ref 65–140)
GLUCOSE SERPL-MCNC: 139 MG/DL (ref 65–140)
GLUCOSE SERPL-MCNC: 174 MG/DL (ref 65–140)
GLUCOSE SERPL-MCNC: 215 MG/DL (ref 65–140)
GLUCOSE SERPL-MCNC: 262 MG/DL (ref 65–140)
HCT VFR BLD AUTO: 31.4 % (ref 36.5–49.3)
HGB BLD-MCNC: 10 G/DL (ref 12–17)
MCH RBC QN AUTO: 30.7 PG (ref 26.8–34.3)
MCHC RBC AUTO-ENTMCNC: 31.8 G/DL (ref 31.4–37.4)
MCV RBC AUTO: 96 FL (ref 82–98)
MRSA NOSE QL CULT: NORMAL
PLATELET # BLD AUTO: 219 THOUSANDS/UL (ref 149–390)
PMV BLD AUTO: 10.5 FL (ref 8.9–12.7)
POTASSIUM SERPL-SCNC: 3.9 MMOL/L (ref 3.5–5.3)
RBC # BLD AUTO: 3.26 MILLION/UL (ref 3.88–5.62)
SODIUM SERPL-SCNC: 140 MMOL/L (ref 136–145)
VANCOMYCIN TROUGH SERPL-MCNC: 13.7 UG/ML (ref 10–20)
WBC # BLD AUTO: 9.06 THOUSAND/UL (ref 4.31–10.16)

## 2021-02-19 PROCEDURE — 82948 REAGENT STRIP/BLOOD GLUCOSE: CPT

## 2021-02-19 PROCEDURE — 80202 ASSAY OF VANCOMYCIN: CPT | Performed by: PODIATRIST

## 2021-02-19 PROCEDURE — 85027 COMPLETE CBC AUTOMATED: CPT | Performed by: STUDENT IN AN ORGANIZED HEALTH CARE EDUCATION/TRAINING PROGRAM

## 2021-02-19 PROCEDURE — 99233 SBSQ HOSP IP/OBS HIGH 50: CPT | Performed by: INTERNAL MEDICINE

## 2021-02-19 PROCEDURE — 99024 POSTOP FOLLOW-UP VISIT: CPT | Performed by: PODIATRIST

## 2021-02-19 PROCEDURE — 80048 BASIC METABOLIC PNL TOTAL CA: CPT | Performed by: STUDENT IN AN ORGANIZED HEALTH CARE EDUCATION/TRAINING PROGRAM

## 2021-02-19 RX ORDER — AMOXICILLIN 250 MG
1 CAPSULE ORAL 2 TIMES DAILY
Status: DISCONTINUED | OUTPATIENT
Start: 2021-02-19 | End: 2021-02-21

## 2021-02-19 RX ADMIN — POLYETHYLENE GLYCOL 3350 17 G: 17 POWDER, FOR SOLUTION ORAL at 08:25

## 2021-02-19 RX ADMIN — DILTIAZEM HYDROCHLORIDE 120 MG: 60 TABLET, FILM COATED ORAL at 08:25

## 2021-02-19 RX ADMIN — LISINOPRIL 40 MG: 20 TABLET ORAL at 08:25

## 2021-02-19 RX ADMIN — DOCUSATE SODIUM AND SENNOSIDES 1 TABLET: 8.6; 5 TABLET ORAL at 17:51

## 2021-02-19 RX ADMIN — DILTIAZEM HYDROCHLORIDE 120 MG: 60 TABLET, FILM COATED ORAL at 17:52

## 2021-02-19 RX ADMIN — CEFEPIME HYDROCHLORIDE 2000 MG: 2 INJECTION, POWDER, FOR SOLUTION INTRAVENOUS at 00:49

## 2021-02-19 RX ADMIN — APIXABAN 5 MG: 5 TABLET, FILM COATED ORAL at 17:52

## 2021-02-19 RX ADMIN — VANCOMYCIN HYDROCHLORIDE 1250 MG: 10 INJECTION, POWDER, LYOPHILIZED, FOR SOLUTION INTRAVENOUS at 05:24

## 2021-02-19 RX ADMIN — ATORVASTATIN CALCIUM 40 MG: 40 TABLET, FILM COATED ORAL at 08:25

## 2021-02-19 RX ADMIN — PIPERACILLIN AND TAZOBACTAM 3.38 G: 36; 4.5 INJECTION, POWDER, FOR SOLUTION INTRAVENOUS at 17:52

## 2021-02-19 RX ADMIN — CEFEPIME HYDROCHLORIDE 2000 MG: 2 INJECTION, POWDER, FOR SOLUTION INTRAVENOUS at 08:25

## 2021-02-19 RX ADMIN — METRONIDAZOLE 500 MG: 500 TABLET ORAL at 13:12

## 2021-02-19 RX ADMIN — DOCUSATE SODIUM AND SENNOSIDES 1 TABLET: 8.6; 5 TABLET ORAL at 09:40

## 2021-02-19 RX ADMIN — METRONIDAZOLE 500 MG: 500 TABLET ORAL at 05:23

## 2021-02-19 RX ADMIN — SODIUM CHLORIDE 75 ML/HR: 0.9 INJECTION, SOLUTION INTRAVENOUS at 15:46

## 2021-02-19 RX ADMIN — INSULIN LISPRO 1 UNITS: 100 INJECTION, SOLUTION INTRAVENOUS; SUBCUTANEOUS at 11:15

## 2021-02-19 RX ADMIN — APIXABAN 5 MG: 5 TABLET, FILM COATED ORAL at 08:25

## 2021-02-19 RX ADMIN — VANCOMYCIN HYDROCHLORIDE 1250 MG: 10 INJECTION, POWDER, LYOPHILIZED, FOR SOLUTION INTRAVENOUS at 13:12

## 2021-02-19 RX ADMIN — PIPERACILLIN AND TAZOBACTAM 3.38 G: 36; 4.5 INJECTION, POWDER, FOR SOLUTION INTRAVENOUS at 21:14

## 2021-02-19 NOTE — PROGRESS NOTES
INTERNAL MEDICINE RESIDENCY PROGRESS NOTE     Name: Tracey Tejeda  Age & Sex: 78 y o  male   MRN: [id number]  Unit/Bed#: [facility] 624-01   Encounter: [id number]  Team: SOD Team B     PATIENT INFORMATION     Name: Tracey Tejeda  Age & Sex: 78 y o  male   MRN: [id number]  Hospital Stay Days: 3    ASSESSMENT/PLAN     Principal Problem:    Sepsis [facility])  Active Problems:    Hyperlipidemia    Type 2 diabetes mellitus with diabetic neuropathy (HCC)    Atrial fibrillation with RVR (HCC)    Essential hypertension    Congenital talipes equinovarus, right foot    Post-operative state    Cellulitis of extremity    Abscess    Drug-induced constipation      Drug-induced constipation  Assessment & Plan  Improving though still with small BMs   - Encourage PO intake  - Continue senna, miralax    Essential hypertension  Assessment & Plan  - patient has history of longstanding hypertension on lisinopril 40mg daily and Cardizem 120mg BID  - continue lisinopril and Cardizem  - trend BMP     Atrial fibrillation with RVR ([address] )  Assessment & Plan  Patient has longstanding history of atrial fibrillation on Cardizem 120 BID for rate control and Eliquis for anticoagulation   Does not follow with cardiology outpatient  - Continue eliquis   - no indication to trend INR unless useful to surgical team  - continue diltiazem 120 BID for rate control, goal HR <100   - given lopressor 5mg x 1 on 2/18 for RVR (asymptomatic)   - HR better today with few brief, self limited episodes of RVR overnight (asymptomatic)   - option to increase to 180 BID if he requires additional rate control, would defer for now  - telemetry given continued RVR    Type 2 diabetes mellitus with diabetic neuropathy [facility])  Assessment & Plan  Lab Results   Component Value Date    HGBA1C 7 8 (H) 12/23/2020       Recent Labs     02/18/21  1137 02/18/21  1735 02/18/21  2359 02/19/21  0601   POCGLU 190* 133 139 135       Blood Sugar Average: Last 72 hrs:  (P) 546 [id number] On sitagliptin, metformin, and glimepiride outpatient: all held on admission  SSI ordered, sugars well controlled  Continue to monitor glucoses in the setting of acute infection and as PO intake improves    Hyperlipidemia  Assessment & Plan  Continue atorvastatin 40mg    * Sepsis ([address] )  Assessment & Plan  Due to infected hardware/hematoma of R foot, now status post removal and washout, POD#2  - per podiatry: monitor for demarcation prior to amputation  - blood cultures negative x 48 hours  - ID consulted  - vancomycin, cefepime, flagyl      Disposition: Per primary team, will continue to follow    SUBJECTIVE     Patient seen and examined  No acute events overnight  On tele, there were few self limited episodes of RVR with rate in the 160s, normalized without intervention  Patient states he was not symptomatic and not aware  At the time of exam, he reports that he feels like he may be developing chills and requests additional blanket  He has ongoing pain in his foot  Otherwise he has no complaints  OBJECTIVE     Vitals:    21 2245 21 0027 21 0223 21 0712   BP: 141/78  136/81 144/95   Pulse: 89  94 (!) 112   Resp: 17   16   Temp: 98 9 °F (37 2 °C)  98 9 °F (37 2 °C) 99 2 °F (37 3 °C)   TempSrc:       SpO2: 98% 95% 98% 98%   Weight:       Height:          Temperature:   Temp (24hrs), Av 9 °F (37 2 °C), Min:98 3 °F (36 8 °C), Max:99 2 °F (37 3 °C)    Temperature: 99 2 °F (37 3 °C)  Intake & Output:  I/O        07 -  0700  07 -  0700  07 -  0700    P  O  480 680     I V  (mL/kg) 500 (4 6) 1112 5 (10 2)     IV Piggyback  600     Total Intake(mL/kg) 980 (9) 2392 5 (21 9)     Urine (mL/kg/hr) 1150 (0 4) 1750 (0 7)     Stool  0     Total Output 1150 1750     Net -170 +642 5            Unmeasured Urine Occurrence  3 x     Unmeasured Stool Occurrence  1 x         Weights:   IBW: 86 8 kg    Body mass index is 29 33 kg/m²    Weight (last 2 days)     None Physical Exam  Constitutional:       General: He is not in acute distress  Appearance: He is well-developed  HENT:      Head: Normocephalic and atraumatic  Eyes:      Conjunctiva/sclera: Conjunctivae normal    Cardiovascular:      Rate and Rhythm: Normal rate and regular rhythm  Heart sounds: Normal heart sounds  No murmur  Pulmonary:      Effort: Pulmonary effort is normal       Breath sounds: Normal breath sounds  No wheezing or rales  Abdominal:      General: Bowel sounds are normal       Palpations: Abdomen is soft  Tenderness: There is no abdominal tenderness  Musculoskeletal:         General: No deformity  Comments: RLE bandaged, great toe dusky   Skin:     General: Skin is warm and dry  Neurological:      Mental Status: He is alert and oriented to person, place, and time  Psychiatric:         Mood and Affect: Mood normal          Behavior: Behavior normal        LABORATORY DATA     Labs: I have personally reviewed pertinent reports    Results from last 7 days   Lab Units 02/19/21 0519 02/18/21 0521 02/17/21 0752 02/16/21  1648   WBC Thousand/uL 9 06 8 26 8 58 14 65*   HEMOGLOBIN g/dL 10 0* 10 6* 11 1* 12 3   HEMATOCRIT % 31 4* 33 5* 34 6* 37 3   PLATELETS Thousands/uL 219 190 178 186   NEUTROS PCT %  --   --   --  88*   MONOS PCT %  --   --   --  6      Results from last 7 days   Lab Units 02/19/21 0519 02/18/21 0521 02/17/21 0752 02/16/21  1648   POTASSIUM mmol/L 3 9 3 4* 3 9 3 7   CHLORIDE mmol/L 109* 110* 111* 106   CO2 mmol/L 24 26 22 22   BUN mg/dL 11 11 10 12   CREATININE mg/dL 0 74 0 87 1 02 1 05   CALCIUM mg/dL 8 5 8 6 8 9 8 8   ALK PHOS U/L  --   --   --  120*   ALT U/L  --   --   --  61   AST U/L  --   --   --  52*              Results from last 7 days   Lab Units 02/18/21 0521 02/17/21 0752 02/16/21  1648   INR  1 36* 1 47* 1 48*   PTT seconds  --   --  37     Results from last 7 days   Lab Units 02/16/21  1855   LACTIC ACID mmol/L 1 2     Results from last 7 days   Lab Units 02/16/21  1648   TROPONIN I ng/mL <0 02       IMAGING & DIAGNOSTIC TESTING     Radiology Results: I have personally reviewed pertinent reports  Xr Foot 2 Vw Right    Result Date: 2/17/2021  Impression: Findings suspicious for infection involving the 4th and 5th tarsometatarsal joints, less conspicuous previously  Status post surgery Workstation performed: [id number]     Xr Foot Right 3+ Views    Result Date: 2/18/2021  Impression: Removal of the external fixation device  Otherwise stable alignment of the post surgical changes  Workstation performed: [id number]     Other Diagnostic Testing: I have personally reviewed pertinent reports      ACTIVE MEDICATIONS     Current Facility-Administered Medications   Medication Dose Route Frequency    acetaminophen (TYLENOL) tablet 650 mg  650 mg Oral Q6H PRN    apixaban (ELIQUIS) tablet 5 mg  5 mg Oral BID    atorvastatin (LIPITOR) tablet 40 mg  40 mg Oral Daily    cefepime (MAXIPIME) 2 g/50 mL dextrose IVPB  2,000 mg Intravenous Q8H    diltiazem (CARDIZEM) tablet 120 mg  120 mg Oral BID    HYDROmorphone (DILAUDID) injection 0 5 mg  0 5 mg Intravenous Q4H PRN    insulin lispro (HumaLOG) 100 units/mL subcutaneous injection 1-6 Units  1-6 Units Subcutaneous Q6H    lisinopril (ZESTRIL) tablet 40 mg  40 mg Oral Daily    metroNIDAZOLE (FLAGYL) tablet 500 mg  500 mg Oral Q8H [address]    oxyCODONE (ROXICODONE) immediate release tablet 10 mg  10 mg Oral Q4H PRN    oxyCODONE (ROXICODONE) IR tablet 5 mg  5 mg Oral Q4H PRN    polyethylene glycol (MIRALAX) packet 17 g  17 g Oral Daily    senna-docusate sodium (SENOKOT S) 8 6-50 mg per tablet 1 tablet  1 tablet Oral BID    sodium chloride 0 9 % infusion  75 mL/hr Intravenous Continuous    vancomycin (VANCOCIN) 1,250 mg in sodium chloride 0 9 % 250 mL IVPB  15 mg/kg (Ideal) Intravenous Q8H       VTE Pharmacologic Prophylaxis: Reason for no pharmacologic prophylaxis Eliquis  VTE Mechanical Prophylaxis: sequential compression device    Portions of the record may have been created with voice recognition software  Occasional wrong word or "sound a like" substitutions may have occurred due to the inherent limitations of voice recognition software    Read the chart carefully and recognize, using context, where substitutions have occurred   ==  Paul Whipple DO  [address]  Internal Medicine Residency PGY-3

## 2021-02-19 NOTE — PLAN OF CARE
Problem: Potential for Falls  Goal: Patient will remain free of falls  Description: INTERVENTIONS:  - Assess patient frequently for physical needs  -  Identify cognitive and physical deficits and behaviors that affect risk of falls    -  [place] fall precautions as indicated by assessment   - Educate patient/family on patient safety including physical limitations  - Instruct patient to call for assistance with activity based on assessment  - Modify environment to reduce risk of injury  - Consider OT/PT consult to assist with strengthening/mobility  Outcome: Progressing     Problem: Prexisting or High Potential for Compromised Skin Integrity  Goal: Skin integrity is maintained or improved  Description: INTERVENTIONS:  - Identify patients at risk for skin breakdown  - Assess and monitor skin integrity  - Assess and monitor nutrition and hydration status  - Monitor labs   - Assess for incontinence   - Turn and reposition patient  - Assist with mobility/ambulation  - Relieve pressure over bony prominences  - Avoid friction and shearing  - Provide appropriate hygiene as needed including keeping skin clean and dry  - Evaluate need for skin moisturizer/barrier cream  - Collaborate with interdisciplinary team   - Patient/family teaching  - Consider wound care consult   Outcome: Progressing     Problem: PAIN - ADULT  Goal: Verbalizes/displays adequate comfort level or baseline comfort level  Description: Interventions:  - Encourage patient to monitor pain and request assistance  - Assess pain using appropriate pain scale  - Administer analgesics based on type and severity of pain and evaluate response  - Implement non-pharmacological measures as appropriate and evaluate response  - Consider cultural and social influences on pain and pain management  - Notify physician/advanced practitioner if interventions unsuccessful or patient reports new pain  Outcome: Progressing     Problem: INFECTION - ADULT  Goal: Absence or prevention of progression during hospitalization  Description: INTERVENTIONS:  - Assess and monitor for signs and symptoms of infection  - Monitor lab/diagnostic results  - Monitor all insertion sites, i e  indwelling lines, tubes, and drains  - Monitor endotracheal if appropriate and nasal secretions for changes in amount and color  - [place] appropriate cooling/warming therapies per order  - Administer medications as ordered  - Instruct and encourage patient and family to use good hand hygiene technique  - Identify and instruct in appropriate isolation precautions for identified infection/condition  Outcome: Progressing  Goal: Absence of fever/infection during neutropenic period  Description: INTERVENTIONS:  - Monitor WBC    Outcome: Progressing     Problem: SAFETY ADULT  Goal: Patient will remain free of falls  Description: INTERVENTIONS:  - Assess patient frequently for physical needs  -  Identify cognitive and physical deficits and behaviors that affect risk of falls    -  [place] fall precautions as indicated by assessment   - Educate patient/family on patient safety including physical limitations  - Instruct patient to call for assistance with activity based on assessment  - Modify environment to reduce risk of injury  - Consider OT/PT consult to assist with strengthening/mobility  Outcome: Progressing  Goal: Maintain or return to baseline ADL function  Description: INTERVENTIONS:  -  Assess patient's ability to carry out ADLs; assess patient's baseline for ADL function and identify physical deficits which impact ability to perform ADLs (bathing, care of mouth/teeth, toileting, grooming, dressing, etc )  - Assess/evaluate cause of self-care deficits   - Assess range of motion  - Assess patient's mobility; develop plan if impaired  - Assess patient's need for assistive devices and provide as appropriate  - Encourage maximum independence but intervene and supervise when necessary  - Involve family in performance of ADLs  - Assess for home care needs following discharge   - Consider OT consult to assist with ADL evaluation and planning for discharge  - Provide patient education as appropriate  Outcome: Progressing  Goal: Maintain or return mobility status to optimal level  Description: INTERVENTIONS:  - Assess patient's baseline mobility status (ambulation, transfers, stairs, etc )    - Identify cognitive and physical deficits and behaviors that affect mobility  - Identify mobility aids required to assist with transfers and/or ambulation (gait belt, sit-to-stand, lift, walker, cane, etc )  - [place] fall precautions as indicated by assessment  - Record patient progress and toleration of activity level on Mobility SBAR; progress patient to next Phase/Stage  - Instruct patient to call for assistance with activity based on assessment  - Consider rehabilitation consult to assist with strengthening/weightbearing, etc   Outcome: Progressing

## 2021-02-19 NOTE — PROGRESS NOTES
Podiatry - Progress Note  Patient: Javy Ziegler  78 y o  male   MRN: [id number]  PCP: Sade Amos MD  Unit/Bed#: [facility] 643-10 Encounter: [id number]  Date Of Visit: 02/19/21    ASSESSMENT:    Javy Ziegler  is a 78 y o  male with:    1  Right foot hematoma  - S/p exfix removal, I&D, fasciotomy (DOS 2/17/21)  2  Congenital talipes equinovarus right foot             - s/p midfoot arthrodesis, exfix application (DOS 9/41/79)  3  T2Dm, last A1C 7 8 %  4  HTN  5  Altered mental status  6  Constipation  7  atrial fibrillation  8  Sepsis on admission - resolved    PLAN:    · Complete dressing change today  Will continue to monitor status of right hallux and skin/soft tissue quality for demarcation through the weekend  · Lengthy discussion with pt today regarding his option TMA vs staged wound debridement and likely right hallux amputation  Right hallux continue to appear dusky with delayed CFT  Pt reports that he wound like to think over his option prior to making a decision  · Leukocytosis this AM now wnl, VSS, has remained afebrile for > 24 hours  · Wcx: Enterobacter and Enterococcus, await sensitivities  Abx plan per ID  · Elevation on green foam wedges or pillows when non-ambulatory  · Appreciate consulting services for recommendations and management  Antibiotics started: vancomycin, metronidazole and cefepime  Pharmacologic VTE Prophylaxis: Eiliquis  Mechanical VTE Prophylaxis: sequential compression device   Weightbearing status: Strict NWB to RLE     Disposition:  Patient requires continued stay for reasons stated above  SUBJECTIVE:     The patient was seen, evaluated, and assessed at bedside today  The patient was awake, alert, and in no acute distress  No acute events overnight  The patient reports no pedal complaints at this time and is feeling better  Patient denies N/V/F/chills/SOB/CP        OBJECTIVE:     Vitals:   /95   Pulse (!) 112   Temp 99 2 °F (37 3 °C)   Resp 16   Ht 6' 4" (1 93 m)   Wt 109 kg (240 lb 15 4 oz)   SpO2 98%   BMI 29 33 kg/m²     Temp (24hrs), Av 9 °F (37 2 °C), Min:98 3 °F (36 8 °C), Max:99 2 °F (37 3 °C)      Physical Exam:     General: Alert, cooperative and no distress  Lungs: Non labored breathing  Abdomen: Soft, non-tender  Lower extremity exam:  Cardiovascular status at baseline  Neurological status at baseline  Musculoskeletal status at baseline  No calf tenderness noted  Inicisons over dorsal-medial, central, lateral aspects of the right foot and plantar medial arch of right foot appear fibrogranular with island of necrotic tissue however mostly viable tissue  Eschar noted to dorsal medial aspect of foot as per picture  The right hallux appears dusky with prolonged CFT  Continued biphasic dopplerable pulses noted of the right AT, DP and , PT, peroneal, and 2nd/3rd digital arteries  Clinical Images 21:                  Additional Data:     Labs:    Results from last 7 days   Lab Units 21  0519  21  1648   WBC Thousand/uL 9 06   < > 14 65*   HEMOGLOBIN g/dL 10 0*   < > 12 3   HEMATOCRIT % 31 4*   < > 37 3   PLATELETS Thousands/uL 219   < > 186   NEUTROS PCT %  --   --  88*   LYMPHS PCT %  --   --  5*   MONOS PCT %  --   --  6   EOS PCT %  --   --  0    < > = values in this interval not displayed  Results from last 7 days   Lab Units 21  0519  21  1648   POTASSIUM mmol/L 3 9   < > 3 7   CHLORIDE mmol/L 109*   < > 106   CO2 mmol/L 24   < > 22   BUN mg/dL 11   < > 12   CREATININE mg/dL 0 74   < > 1 05   CALCIUM mg/dL 8 5   < > 8 8   ALK PHOS U/L  --   --  120*   ALT U/L  --   --  61   AST U/L  --   --  52*    < > = values in this interval not displayed  Results from last 7 days   Lab Units 21  0521   INR  1 36*       * I Have Reviewed All Lab Data Listed Above      Recent Cultures (last 7 days):     Results from last 7 days   Lab Units 21  1636 21  1634 21  1648 21  1630   BLOOD CULTURE   --   --  No Growth at 48 hrs  No Growth at 48 hrs  GRAM STAIN RESULT  1+ Polys*  1+ Gram negative rods* 1+ Polys  No bacteria seen  --   --    WOUND CULTURE  3+ Growth of Enterobacter cloacae complex*  1+ Growth of  2+ Growth of Enterobacter cloacae complex*  1+ Growth of Enterococcus species*  --   --      Results from last 7 days   Lab Units 02/17/21  1636 02/17/21  1634   ANAEROBIC CULTURE  No anaerobes isolated No anaerobes isolated       Imaging: I have personally reviewed pertinent films in PACS  EKG, Pathology, and Other Studies: I have personally reviewed pertinent reports  ** Please Note: Portions of the record may have been created with voice recognition software  Occasional wrong word or "sound a like" substitutions may have occurred due to the inherent limitations of voice recognition software  Read the chart carefully and recognize, using context, where substitutions have occurred   **

## 2021-02-19 NOTE — CASE MANAGEMENT
LOS2  Not a bundle  Green readmission score of 11    TC to patient's room to discuss CM role and dcp  Pt lives w/ wife in double wide trailer home with 3 [address] and handicapped ramp in back  Pt was ambulating with crutches PTA s/p foot surgery  He was requiring assist by wife w/ ADLs s/p surgery also  Patient is NWB right  Pt's wife transports to appointments  DME: has a WC from family members but too small/tight  Crutches  He said walker had been scheduled to be delivered to home a few days ago  Has cane  No prior home care or inpt rehab  TT to Dr Julita Colby to request therapy evaluations as it sounds as if patient was struggling at home with ADLs and wife has difficulty assisting as patient is 240 lbs  Unclear of duration of IV antibiotics  He will order same  Pt denies h/o mental health and substance abuse treatment in past  PCP: Dr Yun Collins  Pharmacy:[facility]  Wife will transport upon d/c  Will f/u for d/c needs

## 2021-02-19 NOTE — CONSULTS
Vancomycin IV Pharmacy-to-Dose Consultation    Vancomycin has been discontinued  Pharmacy will sign off  Please contact or re-consult with questions      Ángel Gill, Ailyn, BCIDP

## 2021-02-19 NOTE — PROGRESS NOTES
Progress Note - Infectious Disease   Kary Vaca  78 y o  male MRN: [id number]  Unit/Bed#: [facility] 624-01 Encounter: [id number]      Impression/Plan:  30-year-old male patient history of Type II diabetes, hypertension, hyperlipidemia, AFib, clubfoot status post reconstructive surgery on 02/12/2021, external fixator placement placement with complicated postop bleeding required extra sutures prior to [address] 02/13/[address]21 [address]  Patient  presented to the hospital fever, chills, palpitation for 48-H prior to presentation on 02/17/2021  Patient had fever of 103, tachycardia, tachypnea, leukocytosis, elevated lactic acid with x-ray of the foot shows 4 in 5 tarsometatarsal joint infection, significant soft tissue swelling  Patient is status post external fixator removal, evacuation of large hematoma, fasciotomy for compartment syndrome on 02/17/2021  Patient postoperative day 2       1  Right foot infected hematoma-cellulitis-septic arthritis  2  Compartment syndrome status post fasciotomy-concern for ischemic changes on the left big toe  3  Sepsis due to 1   4  AFib with controlled heart rate  5  Diabetes type 2  6  Hyperlipidemia     Plan:     · Continue vancomycin, cefepime, Flagyl  · Pharmacy following for vancomycin dose adjustment  · Afebrile more than 24 hours-leukocytosis improved  Lactic acidosis resolved  · Wound Cx growing Enterobacter [place], Enterococcus-pending susceptibility test  · Descalate  accordingly  · Negative blood cultures 48 hours  · Monitor temperature, white blood cell count care  · Rest of management per Podiatry, internal medicine team     Antibiotics:  Vanco, cefepime, Flagyl day 3    Subjective:  Patient has no fever, chills, sweats; no nausea, vomiting, diarrhea; no cough, shortness of breath; no pain  Postoperative day 2-denies any pain  No acute event during the night  No new symptoms      Objective:  Vitals:  Temp:  [98 3 °F (36 8 °C)-99 2 °F (37 3 °C)] 99 2 °F (37 3 °C)  HR:  [] 112  Resp:  [16-20] 16  BP: (113-144)/(69-95) 144/95  SpO2:  [94 %-99 %] 98 %  Temp (24hrs), Av 9 °F (37 2 °C), Min:98 3 °F (36 8 °C), Max:99 2 °F (37 3 °C)  Current: Temperature: 99 2 °F (37 3 °C)    Physical Exam:   General Appearance:  Alert, interactive, nontoxic, no acute distress  Throat: Oropharynx moist without lesions  Lungs:   Clear to auscultation bilaterally; no wheezes, rhonchi or rales; respirations unlabored   Heart:  RRR; no murmur, rub or gallop   Abdomen:   Soft, non-tender, non-distended, positive bowel sounds  Extremities: Dressing right foot-swelling-purple color in the Rt Toe   Skin: No new rashes or lesions  No draining wounds noted  Labs, Imaging, & Other studies:   All pertinent labs and imaging studies were personally reviewed  Results from last 7 days   Lab Units 21  0519 21  0521 21  0752   WBC Thousand/uL 9 06 8 26 8 58   HEMOGLOBIN g/dL 10 0* 10 6* 11 1*   PLATELETS Thousands/uL 219 190 178     Results from last 7 days   Lab Units 21  0519 21  0521 21  0752 21  1648   SODIUM mmol/L 140 142 139 137   POTASSIUM mmol/L 3 9 3 4* 3 9 3 7   CHLORIDE mmol/L 109* 110* 111* 106   CO2 mmol/L 24 26 22 22   BUN mg/dL 11 11 10 12   CREATININE mg/dL 0 74 0 87 1 02 1 05   EGFR ml/min/1 73sq m 95 89 75 73   CALCIUM mg/dL 8 5 8 6 8 9 8 8   AST U/L  --   --   --  52*   ALT U/L  --   --   --  61   ALK PHOS U/L  --   --   --  120*     Results from last 7 days   Lab Units 21  1636 21  1634 21  1648 21  1630   BLOOD CULTURE   --   --  No Growth at 48 hrs  No Growth at 48 hrs     GRAM STAIN RESULT  1+ Polys*  1+ Gram negative rods* 1+ Polys  No bacteria seen  --   --    WOUND CULTURE  3+ Growth of Enterobacter cloacae complex*  1+ Growth of  2+ Growth of Enterobacter cloacae complex*  1+ Growth of Enterococcus species*  --   --      Results from last 7 days   Lab Units 21  3171   PROCALCITONIN ng/ml 1 08*

## 2021-02-20 LAB
ANION GAP SERPL CALCULATED.3IONS-SCNC: 7 MMOL/L (ref 4–13)
BACTERIA WND AEROBE CULT: ABNORMAL
BACTERIA WND AEROBE CULT: ABNORMAL
BUN SERPL-MCNC: 10 MG/DL (ref 5–25)
CALCIUM SERPL-MCNC: 8.4 MG/DL (ref 8.3–10.1)
CHLORIDE SERPL-SCNC: 109 MMOL/L (ref 100–108)
CO2 SERPL-SCNC: 25 MMOL/L (ref 21–32)
CREAT SERPL-MCNC: 0.73 MG/DL (ref 0.6–1.3)
ERYTHROCYTE [DISTWIDTH] IN BLOOD BY AUTOMATED COUNT: 14.7 % (ref 11.6–15.1)
GFR SERPL CREATININE-BSD FRML MDRD: 95 ML/MIN/1.73SQ M
GLUCOSE SERPL-MCNC: 172 MG/DL (ref 65–140)
GLUCOSE SERPL-MCNC: 183 MG/DL (ref 65–140)
GLUCOSE SERPL-MCNC: 187 MG/DL (ref 65–140)
GLUCOSE SERPL-MCNC: 209 MG/DL (ref 65–140)
GRAM STN SPEC: ABNORMAL
GRAM STN SPEC: ABNORMAL
HBV SURFACE AG SER QL: NORMAL
HCT VFR BLD AUTO: 32.3 % (ref 36.5–49.3)
HCV AB SER QL: NORMAL
HGB BLD-MCNC: 10.6 G/DL (ref 12–17)
HIV 1+2 AB+HIV1 P24 AG SERPL QL IA: REACTIVE
HIV1 P24 AG SER QL: ABNORMAL
MCH RBC QN AUTO: 31.1 PG (ref 26.8–34.3)
MCHC RBC AUTO-ENTMCNC: 32.8 G/DL (ref 31.4–37.4)
MCV RBC AUTO: 95 FL (ref 82–98)
PLATELET # BLD AUTO: 261 THOUSANDS/UL (ref 149–390)
PMV BLD AUTO: 10.1 FL (ref 8.9–12.7)
POTASSIUM SERPL-SCNC: 3.6 MMOL/L (ref 3.5–5.3)
PROCALCITONIN SERPL-MCNC: 5.33 NG/ML
RBC # BLD AUTO: 3.41 MILLION/UL (ref 3.88–5.62)
SODIUM SERPL-SCNC: 141 MMOL/L (ref 136–145)
WBC # BLD AUTO: 9.89 THOUSAND/UL (ref 4.31–10.16)

## 2021-02-20 PROCEDURE — 87806 HIV AG W/HIV1&2 ANTB W/OPTIC: CPT | Performed by: STUDENT IN AN ORGANIZED HEALTH CARE EDUCATION/TRAINING PROGRAM

## 2021-02-20 PROCEDURE — 87340 HEPATITIS B SURFACE AG IA: CPT | Performed by: STUDENT IN AN ORGANIZED HEALTH CARE EDUCATION/TRAINING PROGRAM

## 2021-02-20 PROCEDURE — G0432 EIA HIV-1/HIV-2 SCREEN: HCPCS | Performed by: STUDENT IN AN ORGANIZED HEALTH CARE EDUCATION/TRAINING PROGRAM

## 2021-02-20 PROCEDURE — 85027 COMPLETE CBC AUTOMATED: CPT | Performed by: INTERNAL MEDICINE

## 2021-02-20 PROCEDURE — 80048 BASIC METABOLIC PNL TOTAL CA: CPT | Performed by: INTERNAL MEDICINE

## 2021-02-20 PROCEDURE — 82948 REAGENT STRIP/BLOOD GLUCOSE: CPT

## 2021-02-20 PROCEDURE — 86803 HEPATITIS C AB TEST: CPT | Performed by: STUDENT IN AN ORGANIZED HEALTH CARE EDUCATION/TRAINING PROGRAM

## 2021-02-20 PROCEDURE — 87536 HIV-1 QUANT&REVRSE TRNSCRPJ: CPT | Performed by: STUDENT IN AN ORGANIZED HEALTH CARE EDUCATION/TRAINING PROGRAM

## 2021-02-20 PROCEDURE — 84145 PROCALCITONIN (PCT): CPT | Performed by: STUDENT IN AN ORGANIZED HEALTH CARE EDUCATION/TRAINING PROGRAM

## 2021-02-20 PROCEDURE — 97167 OT EVAL HIGH COMPLEX 60 MIN: CPT

## 2021-02-20 PROCEDURE — 99233 SBSQ HOSP IP/OBS HIGH 50: CPT | Performed by: INTERNAL MEDICINE

## 2021-02-20 PROCEDURE — 86702 HIV-2 ANTIBODY: CPT | Performed by: STUDENT IN AN ORGANIZED HEALTH CARE EDUCATION/TRAINING PROGRAM

## 2021-02-20 PROCEDURE — 86701 HIV-1ANTIBODY: CPT | Performed by: STUDENT IN AN ORGANIZED HEALTH CARE EDUCATION/TRAINING PROGRAM

## 2021-02-20 PROCEDURE — 97163 PT EVAL HIGH COMPLEX 45 MIN: CPT

## 2021-02-20 PROCEDURE — 99024 POSTOP FOLLOW-UP VISIT: CPT | Performed by: PODIATRIST

## 2021-02-20 RX ORDER — INSULIN GLARGINE 100 [IU]/ML
10 INJECTION, SOLUTION SUBCUTANEOUS
Status: DISCONTINUED | OUTPATIENT
Start: 2021-02-20 | End: 2021-02-22

## 2021-02-20 RX ADMIN — DOCUSATE SODIUM AND SENNOSIDES 1 TABLET: 8.6; 5 TABLET ORAL at 08:47

## 2021-02-20 RX ADMIN — ATORVASTATIN CALCIUM 40 MG: 40 TABLET, FILM COATED ORAL at 08:48

## 2021-02-20 RX ADMIN — PIPERACILLIN AND TAZOBACTAM 4.5 G: 36; 4.5 INJECTION, POWDER, FOR SOLUTION INTRAVENOUS at 17:57

## 2021-02-20 RX ADMIN — POLYETHYLENE GLYCOL 3350 17 G: 17 POWDER, FOR SOLUTION ORAL at 08:47

## 2021-02-20 RX ADMIN — PIPERACILLIN AND TAZOBACTAM 4.5 G: 36; 4.5 INJECTION, POWDER, FOR SOLUTION INTRAVENOUS at 23:59

## 2021-02-20 RX ADMIN — PIPERACILLIN AND TAZOBACTAM 3.38 G: 36; 4.5 INJECTION, POWDER, FOR SOLUTION INTRAVENOUS at 05:54

## 2021-02-20 RX ADMIN — INSULIN GLARGINE 10 UNITS: 100 INJECTION, SOLUTION SUBCUTANEOUS at 21:59

## 2021-02-20 RX ADMIN — PIPERACILLIN AND TAZOBACTAM 4.5 G: 36; 4.5 INJECTION, POWDER, FOR SOLUTION INTRAVENOUS at 12:27

## 2021-02-20 RX ADMIN — SODIUM CHLORIDE 75 ML/HR: 0.9 INJECTION, SOLUTION INTRAVENOUS at 05:56

## 2021-02-20 RX ADMIN — DOCUSATE SODIUM AND SENNOSIDES 1 TABLET: 8.6; 5 TABLET ORAL at 17:56

## 2021-02-20 RX ADMIN — APIXABAN 5 MG: 5 TABLET, FILM COATED ORAL at 17:56

## 2021-02-20 RX ADMIN — APIXABAN 5 MG: 5 TABLET, FILM COATED ORAL at 08:47

## 2021-02-20 RX ADMIN — DILTIAZEM HYDROCHLORIDE 120 MG: 60 TABLET, FILM COATED ORAL at 17:56

## 2021-02-20 RX ADMIN — DILTIAZEM HYDROCHLORIDE 120 MG: 60 TABLET, FILM COATED ORAL at 08:48

## 2021-02-20 RX ADMIN — LISINOPRIL 40 MG: 20 TABLET ORAL at 08:47

## 2021-02-20 NOTE — PHYSICAL THERAPY NOTE
Physical Therapy Evaluation    Patient's Name: Will Antes      Admitting Diagnosis  Preoperative clearance [Z01 818]  Sepsis ([address] ) [A41 9]  Hyperlipidemia, unspecified hyperlipidemia type [E78 5]  Type 2 diabetes mellitus with diabetic neuropathy, unspecified whether long term insulin use (HCC) [E11 40]  AMS (altered mental status) [R41 82]  Sepsis without acute organ dysfunction, due to unspecified organism ([address] ) [A41 9]  Congenital talipes equinovarus, right foot [Q66 01]    Problem List  Patient Active Problem List   Diagnosis    Hyperlipidemia    Type 2 diabetes mellitus with diabetic neuropathy ([address] )    Atrial fibrillation with RVR ([address] )    Right middle lobe pulmonary nodule    Essential hypertension    Acquired absence of other right toe(s) ([address] )    Benign prostatic hyperplasia with lower urinary tract symptoms    Positive for microalbuminuria    Congenital talipes equinovarus, right foot    Deep disruption or dehiscence of operation wound    Post-operative state    Sepsis ([address] )    Cellulitis of extremity    Abscess    Drug-induced constipation       Past Medical History  Past Medical History:   Diagnosis Date    Arthritis     Atrial fibrillation ([address] )     Diagnosed 11/2018    Benign prostate hyperplasia 04/2002    Cellulitis     right lower leg     Colon polyp 2006    Diabetes mellitus ([address] )     Hearing aid worn     bilat    Hearing loss     90% loss left ear and 40% right ear    History of clubfoot     "since birth"    History of pneumonia     History of TIA (transient ischemic attack) 04/25/2017 11/30/18 pt denies    Hyperlipidemia 5/15/2014    Hypertension     Infectious viral hepatitis     "cant remember type"    Irregular heart beat     Neuropathy     both feet    Osteomyelitis ([address] )     right great toe    Right middle lobe pulmonary nodule 11/6/2018    Seasickness     Teeth missing     Type 2 diabetes mellitus with diabetic neuropathy ([address] ) 11/6/2018    Wears glasses     reading    Wound, open     bottom of right foot       Past Surgical History  Past Surgical History:   Procedure Laterality Date    BUNIONECTOMY Right 12/4/2018    Procedure: 5TH METATARSAL BONE PARTIAL RESECTION, FULL THICKNESS DEBRIDEMENT OF DIABETIC ULCER;  Surgeon: Lamonte Holter, DPM;  [facility];  Service: Podiatry    CLUB FOOT RELEASE Bilateral     COLONOSCOPY      EXTERNAL FIXATOR APPLICATION Right 7/85/1669    Procedure: Application multiplane external fixation;  Surgeon: Britney Pacheco DPM;  [facility];  Service: Podiatry    FOOT HARDWARE REMOVAL Right 2/17/2021    Procedure: REMOVAL EXTERNAL FIXATOR;  Surgeon: Britney Pacheco DPM;  [facility];  Service: Podiatry    INCISION AND DRAINAGE OF WOUND Right 2/17/2021    Procedure: INCISION AND DRAINAGE (I&D) EXTREMITY;  Surgeon: Britney Pacheco DPM;  [facility];  Service: Podiatry    LA FUSION FOOT BONES,MIDTARSAL,OSTEOTMY Right 2/12/2021    Procedure: foot ARTHRODESIS/FUSION;  Surgeon: Britney Pacheco DPM;  [facility];  Service: Podiatry    LA LENGTH/SHORT LEG/ANKL TENDON,SINGLE Right 2/12/2021    Procedure: LENGTHEN TIBIAL TENDON, TRANS HEEL CORD;  Surgeon: Britney Pacheco DPM;  [facility];  Service: Podiatry    LA PART [address] BONE METATARSAL HEAD,EA Right 12/29/2020    Procedure: EXCISION EXOSTOSIS;  Surgeon: Britney Pacheco DPM;  [facility];  Service: Podiatry    LA SECD CLOS SURG WND EXTEN/COMPLIC Right 87/62/3277    Procedure: PRIMARY DELAYED CLOSURE;  Surgeon: Britney Pacheco DPM;  [facility];  Service: Podiatry    TOE AMPUTATION Right     partial great toe    VASECTOMY  1992 02/20/21 1111   PT Last Visit   PT Visit Date 02/20/21   Note Type   Note type Evaluation   Pain Assessment   Pain Assessment Tool 0-10   Pain Score 3   Pain Location/Orientation Orientation: Right;Location: Foot   Hospital Pain Intervention(s) Repositioned; Ambulation/increased activity   Home Living   Type of [address] [place] [address] One level;Ramped entrance   Bathroom Shower/Tub Tub/shower unit   [address]; Wheelchair-manual   Additional Comments Pt lives in a 1 story mobile home with ramped entrance  Reports using crutches and w/c for mobility PTA   Prior Function   Level of [place] Independent with ADLs and functional mobility   Lives With Spouse   Receives Help From Family   ADL Assistance Independent   IADLs Independent   Falls in the last 6 months 0   Vocational Full time employment   Comments Pt lives with his wife   Restrictions/Precautions   Weight Bearing Precautions Per Order Yes   RLE Weight Bearing Per Order NWB   Other Precautions WBS; Multiple lines; Fall Risk;Pain   Cognition   Overall Cognitive Status WFL   Attention Within functional limits   Orientation Level Oriented X4   Memory Within functional limits   Following Commands Follows all commands and directions without difficulty   Comments pt demonstrates good understanding of NWBing status during functional mobility   RLE Assessment   RLE Assessment X  (hip and knee at least 3+/5, assessed during mobility)   LLE Assessment   LLE Assessment WFL   Bed Mobility   Supine to Sit 5  Supervision   Additional items HOB elevated; Increased time required   Sit to Supine 5  Supervision   Additional items Increased time required   Transfers   Sit to Stand 3  Moderate assistance   Additional items Assist x 2; Increased time required;Verbal cues   Stand to Sit 3  Moderate assistance   Additional items Assist x 2; Increased time required;Verbal cues   Additional Comments Transfers with RW   Bed height elevated to assist with transfer   Ambulation/Elevation   Gait pattern   ("hop to")   Gait Assistance 3  Moderate assist   Additional items Assist x 1;Verbal cues   Assistive Device Rolling walker   Distance 10ft "hop to" pattern on LLE with VCs for sequencing with RW when turning   Balance   Static Sitting Fair +   Dynamic Sitting Fair   Static Standing Fair -   Dynamic Standing Poor +   Ambulatory Poor   Activity Tolerance   Activity Tolerance Patient limited by pain; Patient limited by fatigue   Medical Staff Made Aware MAGO Carpio   Nurse Made Aware pt okay to see per RN   Assessment   Prognosis Good   Problem List Decreased strength;Decreased endurance; Impaired balance;Decreased mobility;Orthopedic restrictions;Pain   Assessment Pt is a 78 y o  male seen for PT evaluation s/p admit to [facility] on 2/16/2021  Pt was admitted with a primary dx of: sepsis and congenital talipes equinovarus s/p "INCISION AND DRAINAGE (I&D) EXTREMITY (Right), REMOVAL EXTERNAL FIXATOR (Right)"  PT now consulted for assessment of mobility and d/c needs  Pt with active PT evaluation orders and NWBing orders for RLE  Pts current comorbidities effecting treatment include:  h/o  Arthritis, Atrial fibrillation ([address] ), Benign prostate hyperplasia (04/2002), Cellulitis, Colon polyp (2006), Diabetes mellitus ([address] ), Hearing aid worn, Hearing loss, History of clubfoot, History of pneumonia, History of TIA (transient ischemic attack) (04/25/2017), Hyperlipidemia (5/15/2014), Hypertension, Infectious viral hepatitis, Irregular heart beat, Neuropathy, Osteomyelitis ([address] ), Right middle lobe pulmonary nodule (11/6/2018), Seasickness, Teeth missing, Type 2 diabetes mellitus with diabetic neuropathy ([address] ) (11/6/2018), Wears glasses, and Wound, open  Pts current clinical presentation is Unstable/ Unpredictable (high complexity) due to Ongoing medical management for primary dx, Increased reliance on more restrictive AD compared to baseline, Decreased activity tolerance compared to baseline, Fall risk, Increased assistance needed from caregiver at current time, s/p surgical intervention   Prior to admission, pt was independent with ADLs and ambulating with crutches PTA  Pt lives with his wife in a 1 story mobile home with ramped entrance  Pt also reports using a w/c for mobility at times  Upon evaluation, pt currently is requiring supervision for bed mobility; modAx2 for transfers and modAx2 for ambulation ("hop to") 10 ft w/ RW  Pt presents at PT eval functioning below baseline and currently w/ overall mobility deficits 2* to: RLE weakness, decreased ROM, impaired balance, decreased endurance, gait deviations, pain, decreased activity tolerance compared to baseline, decreased functional mobility tolerance compared to baseline, fall risk, orthopedic restrictions  Pt currently at a fall risk 2* to impairments listed above  Pt will continue to benefit from skilled acute PT interventions to address stated impairments; to maximize functional mobility; for ongoing pt/ family training; and DME needs  See below for continued assessment   Barriers to Discharge Comments  At conclusion of PT session pt returned BTB with phone and call bell within reach  Pt denies any further questions at this time  The patient's AM-PAC Basic Mobility Inpatient Short Form Raw Score is 14, Standardized Score is 35 55  A standardized score less than 42 9 suggests the patient may benefit from discharge to post-acute rehabilitation services  However, anticipate pt will progress quickly to allow for d/c home as he has good social support and an accessible home  Please also refer to the recommendation of the Physical Therapist for safe discharge planning  Recommending home with social support and HHPT upon d/c  Goals   Patient Goals to get better   STG Expiration Date 03/06/21   Short Term Goal #1 In 10-14 days pt will be able to: 1  Demonstrate ability to perform all aspects of bed mobility independently to increase functional independence  2  Perform functional transfers with LRAD at mod I level to facilitate safe return to previous living environment    3   Ambulate at least 150 ft with LRAD at mod I level with stable vitals to improve safety with household distances and reduce fall risk  4  Improve LE strength grades by 1 to increase ease of functional mobility with transfers and gait  5  Pt will demonstrate improved balance by one grade order to decrease risk of falls  PT Treatment Day 0   Plan   Treatment/Interventions Functional transfer training;LE strengthening/ROM; Elevations; Therapeutic exercise; Endurance training;Bed mobility;Gait training;OT   PT Frequency Other (Comment)  (3-6x/wk)   Recommendation   PT Discharge Recommendation Return to previous environment with social support;Home with skilled therapy  (pending progress)   Equipment Recommended Walker  (at this time)   [address] [employer] in Bed Without Bedrails 4   Lying on Back to Sitting on Edge of Flat Bed 3   Moving Bed to Chair 2   Standing Up From Chair 1   Walk in Room 2   Climb 3-5 Stairs 2   Basic Mobility Inpatient Raw Score 14   Basic Mobility Standardized Score 35 55   Modified Regan Scale   Modified Regan Scale 4       Piedad Rather, PT, DPT

## 2021-02-20 NOTE — OCCUPATIONAL THERAPY NOTE
Occupational Therapy Evaluation     Patient Name: Leah Ramon    Today's Date: 2/20/2021  Problem List  Principal Problem:    Sepsis [facility])  Active Problems:    Hyperlipidemia    Type 2 diabetes mellitus with diabetic neuropathy (HCC)    Atrial fibrillation with RVR ([address] )    Essential hypertension    Congenital talipes equinovarus, right foot    Post-operative state    Cellulitis of extremity    Abscess    Drug-induced constipation    Past Medical History  Past Medical History:   Diagnosis Date    Arthritis     Atrial fibrillation ([address] )     Diagnosed 11/2018    Benign prostate hyperplasia 04/2002    Cellulitis     right lower leg     Colon polyp 2006    Diabetes mellitus ([address] )     Hearing aid worn     bilat    Hearing loss     90% loss left ear and 40% right ear    History of clubfoot     "since birth"    History of pneumonia     History of TIA (transient ischemic attack) 04/25/2017 11/30/18 pt denies    Hyperlipidemia 5/15/2014    Hypertension     Infectious viral hepatitis     "cant remember type"    Irregular heart beat     Neuropathy     both feet    Osteomyelitis ([address] )     right great toe    Right middle lobe pulmonary nodule 11/6/2018    Seasickness     Teeth missing     Type 2 diabetes mellitus with diabetic neuropathy [facility] ) 11/6/2018    Wears glasses     reading    Wound, open     bottom of right foot     Past Surgical History  Past Surgical History:   Procedure Laterality Date    BUNIONECTOMY Right 12/4/2018    Procedure: 5TH METATARSAL BONE PARTIAL RESECTION, FULL THICKNESS DEBRIDEMENT OF DIABETIC ULCER;  Surgeon: Tommy Sotomayor DPM;  [facility];  Service: Podiatry    CLUB FOOT RELEASE Bilateral     COLONOSCOPY      EXTERNAL FIXATOR APPLICATION Right 2/92/7390    Procedure: Application multiplane external fixation;  Surgeon: Andi Beck DPM;  [facility];  Service: Podiatry    FOOT HARDWARE REMOVAL Right 2/17/2021    Procedure: REMOVAL EXTERNAL FIXATOR;  Surgeon: Enrique Baer DPM;  [facility];  Service: Podiatry    INCISION AND DRAINAGE OF WOUND Right 2/17/2021    Procedure: INCISION AND DRAINAGE (I&D) EXTREMITY;  Surgeon: Enrique Baer DPM;  [facility];  Service: Podiatry    HI FUSION FOOT BONES,MIDTARSAL,OSTEOTMY Right 2/12/2021    Procedure: foot ARTHRODESIS/FUSION;  Surgeon: Enrique Baer DPM;  [facility];  Service: Podiatry    HI LENGTH/SHORT LEG/ANKL TENDON,SINGLE Right 2/12/2021    Procedure: LENGTHEN TIBIAL TENDON, TRANS HEEL CORD;  Surgeon: Enrique Baer DPM;  [facility];  Service: Podiatry    HI PART [facility] Right 12/29/2020    Procedure: EXCISION EXOSTOSIS;  Surgeon: Enrique Baer DPM;  [facility];  Service: [address] WND EXTEN/COMPLIC Right 32/03/2090    Procedure: PRIMARY DELAYED CLOSURE;  Surgeon: Enrique Baer DPM;  [facility];  Service: Podiatry    TOE AMPUTATION Right     partial great toe    VASECTOMY  1992 02/20/21 1110   OT Last Visit   OT Visit Date 02/20/21   Note Type   Note type Evaluation   Restrictions/Precautions   Weight Bearing Precautions Per Order Yes   RLE Weight Bearing Per Order NWB   Other Precautions WBS; Multiple lines; Fall Risk;Pain   Pain Assessment   Pain Assessment Tool 0-10   Pain Score 3   Pain Location/Orientation Orientation: Left; Location: [facility] One level;Ramped entrance   Bathroom Shower/Tub Tub/shower unit   [address]   (denies)   [address]; Wheelchair-manual   Additional Comments Pt reports living in a 1 story home with a ramp to enter      Prior Function   Level of [place] Independent with ADLs and functional mobility   Lives With Spouse   Receives Help From Family   ADL Assistance Independent   IADLs Independent   Falls in the last 6 months 0   Vocational Full time employment   Lifestyle   Autonomy Pt reports that at baseline he is I with Joyce Schmid and has been using crutches for mobility PTA  (+)     Reciprocal Relationships Pt lives with his wife who he reports is able to assist as needed upon d/c     Service 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   [address] Enjoys golfing    ADL   Where Assessed Edge of bed   Eating Assistance 7  [facility] 5  Supervision/Setup   UB [address] 4  Minimal Assistance   LB [address] 3  Moderate Assistance   [address] 4  [address] 3  [address]  3  Moderate Assistance   Bed Mobility   Supine to Sit 5  Supervision   Additional items Increased time required   Sit to Supine 5  Supervision   Additional items Increased time required   Additional Comments After OT session pt in bed with all needs within reach  Transfers   Sit to Stand 3  Moderate assistance   Additional items Assist x 2; Increased time required;Verbal cues   Stand to Sit 3  Moderate assistance   Additional items Assist x 2; Increased time required;Verbal cues   Functional Mobility   Functional Mobility 3  Moderate assistance   Additional Comments Pt demonstrated short mobility within room with mod A and RW  Additional items Rolling walker   Balance   Static Sitting Fair +   Dynamic Sitting Fair   Static Standing Fair -   Dynamic Standing Poor +   Ambulatory Poor   Activity Tolerance   Activity Tolerance Patient limited by pain; Patient limited by fatigue   Medical Staff Made Aware PT Nhi   Nurse Made Aware RN confirmed okay to see pt   RUE Assessment   RUE Assessment WFL   LUE Assessment   LUE Assessment WFL   Cognition   Overall Cognitive Status WFL   Arousal/Participation Alert; Cooperative   Attention Within functional limits   Orientation Level Oriented X4   Memory Within functional limits   Following Commands Follows all commands and directions without difficulty   Comments Pt is pleasant and cooperative to work with therapy  Pt demonstrates good understanding of NWB status  Assessment   Limitation Decreased ADL status; Decreased endurance;Decreased self-care trans;Decreased high-level ADLs   Prognosis Good   Assessment Pt is a 78 y o  male admitted to [facility] on 2/16/2021 w/ sepsis and congential talipes equinovarus s/p "INCISION AND DRAINAGE (I&D) EXTREMITY (Right), REMOVAL EXTERNAL FIXATOR (Right)"  Comorbidities include a h/o  Arthritis, Atrial fibrillation ([facility] ), Benign prostate hyperplasia (04/2002), Cellulitis, Colon polyp (2006), Diabetes mellitus ([facility] [address] [facility] ), Hearing aid worn, Hearing loss, History of clubfoot, History of pneumonia, History of TIA (transient ischemic attack) (04/25/2017), Hyperlipidemia (5/15/2014), Hypertension, Infectious viral hepatitis, Irregular heart beat, Neuropathy, Osteomyelitis ([facility] [facility] ), Right middle lobe pulmonary nodule (11/6/2018), Seasickness, Teeth missing, Type 2 diabetes mellitus with diabetic neuropathy ([facility] ) (11/6/2018), Wears glasses, and Wound, open  Pt with active OT orders and is NWB to R LE  Pt resides in a 1 story home with a ramp to enter  Pt lives with his wife who he reports is able to assist as needed upon d/c  Pt reports that at baseline he was I w/  ADLS and IADLS, (+) drove, & required no use of DME PTA  Currently pt is mod A x2 for functional transfers, mod A for functional mobility, min A for UB ADLS and mod A for LB ADLS  Pt is limited at this time 2*: pain, endurance, activity tolerance, functional mobility, forward functional reach, functional standing tolerance and decreased I w/ ADLS/IADLS  The following Occupational Performance Areas to address include: grooming, bathing/shower, toilet hygiene, dressing, functional mobility and clothing management   Based on the aforementioned OT evaluation, functional performance deficits, and assessments, pt has been identified as a high complexity evaluation  From OT standpoint, anticipate d/c home with family support pending progress  Pt to continue to benefit from acute immediate OT services to address the following goals 3-5x/week to  w/in 10-14 days: Kurt Almaraz Goals   Patient Goals To feel better and return home   LTG Time Frame 10-14   Long Term Goal #1 See goals below   Plan   Treatment Interventions ADL retraining;Functional transfer training;UE strengthening/ROM; Patient/family training; Endurance training;Equipment evaluation/education; Compensatory technique education;Continued evaluation; Energy conservation; Activityengagement   Goal Expiration Date 21   OT Frequency 3-5x/wk   Recommendation   OT Discharge Recommendation Return to previous environment with social support  (pending progress)   OT - OK to Discharge No   Modified [place] Scale   Modified [place] Scale 4     GOALS    1) Pt will increase activity tolerance to G for 30 min txment sessions    2) Pt will complete UB/LB dressing/self care w/ mod I using adaptive device and DME as needed    3) Pt will complete bathing w/ Mod I w/ use of AE and DME as needed    4) Pt will complete toileting w/ mod I w/ G hygiene/thoroughness using DME as needed    5) Pt will improve functional transfers to Mod I on/off all surfaces using DME as needed w/ G balance/safety while adhering to current weight bearing status    6) Pt will improve functional mobility during ADL/IADL/leisure tasks to Mod I using DME as needed w/ G balance/safety while adhering to current weight bearing status    7) Pt will participate in simulated IADL management task with DME as needed to increase independence to  w/ G safety and endurance    8) Pt will demonstrate G carryover of pt/caregiver education and training as appropriate      9) Pt will demonstrate 100% carryover of energy conservation techniques t/o functional I/ADL/leisure tasks w/o cues s/p skilled education    10) Pt will independently identify and utilize 2-3 coping strategies to increase positive affect and promote overall well-being      11) Pt will engage in ongoing cognitive assessment w/ G participation to assist w/ safe d/c planning/recommendations (as needed)    MIRYAM Muhammad, OTR/L

## 2021-02-20 NOTE — PLAN OF CARE
Problem: OCCUPATIONAL THERAPY ADULT  Goal: Performs self-care activities at highest level of function for planned discharge setting  See evaluation for individualized goals  Description: Treatment Interventions: ADL retraining, Functional transfer training, UE strengthening/ROM, Patient/family training, Endurance training, Equipment evaluation/education, Compensatory technique education, Continued evaluation, Energy conservation, Activityengagement          See flowsheet documentation for full assessment, interventions and recommendations  Note: Limitation: Decreased ADL status, Decreased endurance, Decreased self-care trans, Decreased high-level ADLs  Prognosis: Good  Assessment: Pt is a 78 y o  male admitted to [facility] on 2/16/2021 w/ sepsis and congential talipes equinovarus s/p "INCISION AND DRAINAGE (I&D) EXTREMITY (Right), REMOVAL EXTERNAL FIXATOR (Right)"  Comorbidities include a h/o  Arthritis, Atrial fibrillation ([facility] [address] [address] ), Benign prostate hyperplasia (04/2002), Cellulitis, Colon polyp (2006), Diabetes mellitus ([facility] [address] [facility] ), Hearing aid worn, Hearing loss, History of clubfoot, History of pneumonia, History of TIA (transient ischemic attack) (04/25/2017), Hyperlipidemia (5/15/2014), Hypertension, Infectious viral hepatitis, Irregular heart beat, Neuropathy, Osteomyelitis ([facility] ), Right middle lobe pulmonary nodule (11/6/2018), Seasickness, Teeth missing, Type 2 diabetes mellitus with diabetic neuropathy ([facility] ) (11/6/2018), Wears glasses, and Wound, open  Pt with active OT orders and is NWB to R LE  Pt resides in a 1 story home with a ramp to enter  Pt lives with his wife who he reports is able to assist as needed upon d/c  Pt reports that at baseline he was I w/  ADLS and IADLS, (+) drove, & required no use of DME PTA  Currently pt is mod A x2 for functional transfers, mod A for functional mobility, min A for UB ADLS and mod A for LB ADLS   Pt is limited at this time 2*: pain, endurance, activity tolerance, functional mobility, forward functional reach, functional standing tolerance and decreased I w/ ADLS/IADLS  The following Occupational Performance Areas to address include: grooming, bathing/shower, toilet hygiene, dressing, functional mobility and clothing management  Based on the aforementioned OT evaluation, functional performance deficits, and assessments, pt has been identified as a high complexity evaluation  From OT standpoint, anticipate d/c home with family support pending progress  Pt to continue to benefit from acute immediate OT services to address the following goals 3-5x/week to  w/in 10-14 days:        OT Discharge Recommendation: Return to previous environment with social support(pending progress)  OT - OK to Discharge: No

## 2021-02-20 NOTE — PROGRESS NOTES
Progress Note - Infectious Disease   Franchesca Moreno  78 y o  male MRN: [id number]  Unit/Bed#: [facility] 624-01 Encounter: [id number]      Impression/Plan:  1- severe sepsis, POA:   Fever, tachycardia, leukocytosis, lactic acidosis, noted on admission   Suspect severe sepsis secondary to surgical site infection, with infected hematoma involving right foot  Concern for hardware infection  Blood culture remains negative so far  No other infectious focus identified based on review of system and physical exam   Patient has clinically improved  -  antibiotics as below  -  supportive treatment as per Internal Medicine team  -  follow-up final result of blood culture collected on admission     2- secondarily infected hematoma with presumptive hardware infection:  Patient was taken to operation room 2/17/2021 where he underwent external fixator removal, hematoma evacuation, fasciotomy  Small amount of purulence noted  Wound cultures collected intraoperatively from lateral and medial aspect of the foot both growing Enterobacter cloaca  E faecalis also growing in 1 operative culture specimen  - continue Zosyn for now but will increase the dose to 4 5 g IV q 6 hours with improved renal function  - follow-up susceptibility testing of E faecalis and Enterobacter and adjust antibiotics accordingly  - final antibiotic plan will depend on extent of amputation/ surgical cure and hardware removal planned once ischemia demarcates by Podiatry    Once amputation occurs, and as long as patient is afebrile and hemodynamically stable, will probably stop antibiotics altogether       3- diabetes mellitus type 2, A1c 7 8%   Risk factor for infectious complications as mentioned above  -  management as per primary service team     4- atrial fibrillation, on anticoagulation:   Anticoagulation probably caused postoperative bleeding and thus hematoma formation as mentioned above    Discussed the above management plan with the primary service    Antibiotics:  Zosyn 2  Total antibiotics 5    Subjective:  Patient has no fever, chills, sweats; no nausea, vomiting, diarrhea; no cough, shortness of breath; no increased pain  No new symptoms  He is anxious to know the timing of his surgical plan  Objective:  Vitals:  Temp:  [97 9 °F (36 6 °C)-99 9 °F (37 7 °C)] 97 9 °F (36 6 °C)  HR:  [] 87  Resp:  [16-17] 16  BP: (122-159)/(77-94) 159/91  SpO2:  [97 %-100 %] 97 %  Temp (24hrs), Av 2 °F (37 3 °C), Min:97 9 °F (36 6 °C), Max:99 9 °F (37 7 °C)  Current: Temperature: 97 9 °F (36 6 °C)    Physical Exam:   General Appearance:  Alert, interactive, nontoxic, no acute distress  Throat: Oropharynx moist without lesions  Lungs:   Clear to auscultation bilaterally; no wheezes, rhonchi or rales; respirations unlabored   Heart:  RRR; no murmur, rub or gallop   Abdomen:   Soft, non-tender, non-distended, positive bowel sounds  Extremities: Right foot heavily dressed with a dry dressing in place  Skin: No new rashes or lesions  No draining wounds noted  Labs, Imaging, & Other studies:   All pertinent labs and imaging studies were personally reviewed  Results from last 7 days   Lab Units 21  0546 21  0521  0521   WBC Thousand/uL 9 89 9 06 8 26   HEMOGLOBIN g/dL 10 6* 10 0* 10 6*   PLATELETS Thousands/uL 261 219 190     Results from last 7 days   Lab Units 21  0546 21  0519 21  0521  21  1648   SODIUM mmol/L 141 140 142   < > 137   POTASSIUM mmol/L 3 6 3 9 3 4*   < > 3 7   CHLORIDE mmol/L 109* 109* 110*   < > 106   CO2 mmol/L 25 24 26   < > 22   BUN mg/dL 10 11 11   < > 12   CREATININE mg/dL 0 73 0 74 0 87   < > 1 05   EGFR ml/min/1 73sq m 95 95 89   < > 73   CALCIUM mg/dL 8 4 8 5 8 6   < > 8 8   AST U/L  --   --   --   --  52*   ALT U/L  --   --   --   --  61   ALK PHOS U/L  --   --   --   --  120*    < > = values in this interval not displayed       Results from last 7 days   Lab Units 02/18/21  1328 02/17/21  1636 02/17/21  1634 02/16/21  1648 02/16/21  1630   BLOOD CULTURE   --   --   --  No Growth at 72 hrs  No Growth at 72 hrs     GRAM STAIN RESULT   --  1+ Polys*  1+ Gram negative rods* 1+ Polys  No bacteria seen  --   --    WOUND CULTURE   --  3+ Growth of Enterobacter cloacae complex*  1+ Growth of  2+ Growth of Enterobacter cloacae complex*  1+ Growth of Enterococcus faecalis*  1+ Growth of   --   --    MRSA CULTURE ONLY  No Methicillin Resistant Staphlyococcus aureus (MRSA) isolated  --   --   --   --      Results from last 7 days   Lab Units 02/16/21  1648   PROCALCITONIN ng/ml 1 08*

## 2021-02-20 NOTE — PHYSICAL THERAPY NOTE
Physical Therapy Evaluation    Patient's Name: Montez Hassan      Admitting Diagnosis  Preoperative clearance [Z01 818]  Sepsis ([address] ) [A41 9]  Hyperlipidemia, unspecified hyperlipidemia type [E78 5]  Type 2 diabetes mellitus with diabetic neuropathy, unspecified whether long term insulin use (HCC) [E11 40]  AMS (altered mental status) [R41 82]  Sepsis without acute organ dysfunction, due to unspecified organism ([address] ) [A41 9]  Congenital talipes equinovarus, right foot [Q66 01]    Problem List  Patient Active Problem List   Diagnosis    Hyperlipidemia    Type 2 diabetes mellitus with diabetic neuropathy ([address] )    Atrial fibrillation with RVR ([address] )    Right middle lobe pulmonary nodule    Essential hypertension    Acquired absence of other right toe(s) ([address] )    Benign prostatic hyperplasia with lower urinary tract symptoms    Positive for microalbuminuria    Congenital talipes equinovarus, right foot    Deep disruption or dehiscence of operation wound    Post-operative state    Sepsis ([address] )    Cellulitis of extremity    Abscess    Drug-induced constipation       Past Medical History  Past Medical History:   Diagnosis Date    Arthritis     Atrial fibrillation ([address] )     Diagnosed 11/2018    Benign prostate hyperplasia 04/2002    Cellulitis     right lower leg     Colon polyp 2006    Diabetes mellitus ([address] )     Hearing aid worn     bilat    Hearing loss     90% loss left ear and 40% right ear    History of clubfoot     "since birth"    History of pneumonia     History of TIA (transient ischemic attack) 04/25/2017 11/30/18 pt denies    Hyperlipidemia 5/15/2014    Hypertension     Infectious viral hepatitis     "cant remember type"    Irregular heart beat     Neuropathy     both feet    Osteomyelitis ([address] )     right great toe    Right middle lobe pulmonary nodule 11/6/2018    Seasickness     Teeth missing     Type 2 diabetes mellitus with diabetic neuropathy ([address] ) 11/6/2018    Wears glasses     reading    Wound, open     bottom of right foot       Past Surgical History  Past Surgical History:   Procedure Laterality Date    BUNIONECTOMY Right 12/4/2018    Procedure: 5TH METATARSAL BONE PARTIAL RESECTION, FULL THICKNESS DEBRIDEMENT OF DIABETIC ULCER;  Surgeon: Patricia Garnica DPM;  [facility];  Service: Podiatry    CLUB FOOT RELEASE Bilateral     COLONOSCOPY      EXTERNAL FIXATOR APPLICATION Right 3/52/2513    Procedure: Application multiplane external fixation;  Surgeon: Mary Kay Fletcher DPM;  [facility];  Service: Podiatry    FOOT HARDWARE REMOVAL Right 2/17/2021    Procedure: REMOVAL EXTERNAL FIXATOR;  Surgeon: Mary Kay Fletcher DPM;  [facility];  Service: Podiatry    INCISION AND DRAINAGE OF WOUND Right 2/17/2021    Procedure: INCISION AND DRAINAGE (I&D) EXTREMITY;  Surgeon: Mary Kay Fletcher DPM;  [facility];  Service: Podiatry    DE FUSION FOOT BONES,MIDTARSAL,OSTEOTMY Right 2/12/2021    Procedure: foot ARTHRODESIS/FUSION;  Surgeon: Mary Kay Fletcher DPM;  [facility];  Service: Podiatry    DE LENGTH/SHORT LEG/ANKL TENDON,SINGLE Right 2/12/2021    Procedure: LENGTHEN TIBIAL TENDON, TRANS HEEL CORD;  Surgeon: Mary Kay Fletcher DPM;  [facility];  Service: Podiatry    DE PART [address] BONE METATARSAL HEAD,EA Right 12/29/2020    Procedure: EXCISION EXOSTOSIS;  Surgeon: Mary Kay Fletcher DPM;  [facility];  Service: Podiatry    DE SECD CLOS SURG WND EXTEN/COMPLIC Right 87/90/3900    Procedure: PRIMARY DELAYED CLOSURE;  Surgeon: Mary Kay Fletcher DPM;  [facility];  Service: Podiatry    TOE AMPUTATION Right     partial great toe    VASECTOMY  1992 02/20/21 1111   PT Last Visit   PT Visit Date 02/20/21   Note Type   Note type Evaluation   Pain Assessment   Pain Assessment Tool 0-10   Pain Score 3   Pain Location/Orientation Orientation: Right;Location: Foot   Hospital Pain Intervention(s) Repositioned; Ambulation/increased activity   Home Living   Type of [address] [place] [address] One level;Ramped entrance   Bathroom Shower/Tub Tub/shower unit   [address]; Wheelchair-manual   Additional Comments Pt lives in a 1 story mobile home with ramped entrance  Reports using crutches and w/c for mobility PTA   Prior Function   Level of [place] Independent with ADLs and functional mobility   Lives With Spouse   Receives Help From Family   ADL Assistance Independent   IADLs Independent   Falls in the last 6 months 0   Vocational Full time employment   Comments Pt lives with his wife   Restrictions/Precautions   Weight Bearing Precautions Per Order Yes   RLE Weight Bearing Per Order NWB   Other Precautions WBS; Multiple lines; Fall Risk;Pain   Cognition   Overall Cognitive Status WFL   Attention Within functional limits   Orientation Level Oriented X4   Memory Within functional limits   Following Commands Follows all commands and directions without difficulty   Comments pt demonstrates good understanding of NWBing status during functional mobility   RLE Assessment   RLE Assessment X  (hip and knee at least 3+/5, assessed during mobility)   LLE Assessment   LLE Assessment WFL   Bed Mobility   Supine to Sit 5  Supervision   Additional items HOB elevated; Increased time required   Sit to Supine 5  Supervision   Additional items Increased time required   Transfers   Sit to Stand 3  Moderate assistance   Additional items Assist x 2; Increased time required;Verbal cues   Stand to Sit 3  Moderate assistance   Additional items Assist x 2; Increased time required;Verbal cues   Additional Comments Transfers with RW   Bed height elevated to assist with transfer   Ambulation/Elevation   Gait pattern   ("hop to")   Gait Assistance 3  Moderate assist   Additional items Assist x 1;Verbal cues   Assistive Device Rolling walker   Distance 10ft "hop to" pattern on LLE with VCs for sequencing with RW when turning   Balance   Static Sitting Fair +   Dynamic Sitting Fair   Static Standing Fair -   Dynamic Standing Poor +   Ambulatory Poor   Activity Tolerance   Activity Tolerance Patient limited by pain; Patient limited by fatigue   Medical Staff Made Aware MAGO Carpio   Nurse Made Aware pt okay to see per RN   Assessment   Prognosis Good   Problem List Decreased strength;Decreased endurance; Impaired balance;Decreased mobility;Orthopedic restrictions;Pain   Assessment Pt is a 78 y o  male seen for PT evaluation s/p admit to [facility] on [facility]16[facility]21  Pt was admitted with a primary dx of: sepsis and congenital talipes equinovarus s/p "INCISION AND DRAINAGE (I&D) EXTREMITY (Right), REMOVAL EXTERNAL FIXATOR (Right)"  PT now consulted for assessment of mobility and d/c needs  Pt with active PT evaluation orders and NWBing orders for RLE  Pts current comorbidities effecting treatment include:  h/o  Arthritis, Atrial fibrillation ([address] ), Benign prostate hyperplasia (04/2002), Cellulitis, Colon polyp (2006), Diabetes mellitus ([address] ), Hearing aid worn, Hearing loss, History of clubfoot, History of pneumonia, History of TIA (transient ischemic attack) (04/25/2017), Hyperlipidemia (5/15/2014), Hypertension, Infectious viral hepatitis, Irregular heart beat, Neuropathy, Osteomyelitis ([address] ), Right middle lobe pulmonary nodule (11/6/2018), Seasickness, Teeth missing, Type 2 diabetes mellitus with diabetic neuropathy ([address] ) (11/6/2018), Wears glasses, and Wound, open  Pts current clinical presentation is Unstable/ Unpredictable (high complexity) due to Ongoing medical management for primary dx, Increased reliance on more restrictive AD compared to baseline, Decreased activity tolerance compared to baseline, Fall risk, Increased assistance needed from caregiver at current time, s/p surgical intervention   Prior to admission, pt was independent with ADLs and ambulating with crutches PTA  Pt lives with his wife in a 1 story mobile home with ramped entrance  Pt also reports using a w/c for mobility at times  Upon evaluation, pt currently is requiring supervision for bed mobility; modAx2 for transfers and modAx2 for ambulation ("hop to") 10 ft w/ RW  Pt presents at PT eval functioning below baseline and currently w/ overall mobility deficits 2* to: RLE weakness, decreased ROM, impaired balance, decreased endurance, gait deviations, pain, decreased activity tolerance compared to baseline, decreased functional mobility tolerance compared to baseline, fall risk, orthopedic restrictions  Pt currently at a fall risk 2* to impairments listed above  Pt will continue to benefit from skilled acute PT interventions to address stated impairments; to maximize functional mobility; for ongoing pt/ family training; and DME needs  At conclusion of PT session pt returned BTB with phone and call bell within reach  Pt denies any further questions at this time  The patient's AM-PAC Basic Mobility Inpatient Short Form Raw Score is 14, Standardized Score i   Goals   Patient Goals to get better   [facility] Expiration Date 03/06/21   Short Term Goal #1 In 10-14 days pt will be able to: 1  Demonstrate ability to perform all aspects of bed mobility independently to increase functional independence  2  Perform functional transfers with LRAD at mod I level to facilitate safe return to previous living environment  3   Ambulate at least 150 ft with LRAD at mod I level with stable vitals to improve safety with household distances and reduce fall risk  4  Improve LE strength grades by 1 to increase ease of functional mobility with transfers and gait  5  Pt will demonstrate improved balance by one grade order to decrease risk of falls  PT Treatment Day 0   Plan   Treatment/Interventions Functional transfer training;LE strengthening/ROM; Elevations; Therapeutic exercise; Endurance training;Bed mobility;Gait training;OT   PT Frequency Other (Comment)  (3-6x/wk)   Recommendation   PT Discharge Recommendation Return to previous environment with social support;Home with skilled therapy  (pending progress)   Equipment Recommended Walker  (at this time)   [address] in Bed Without Bedrails 4   Lying on Back to Sitting on Edge of Flat Bed 3   Moving Bed to Chair 2   Standing Up From Chair 1   Walk in Room 2   Climb 3-5 Stairs 2   Basic Mobility Inpatient Raw Score 14   Basic Mobility Standardized Score 35 55   Modified Clark Scale   Modified Clark Scale 4       Colten Gaspar, PT, DPT

## 2021-02-20 NOTE — PLAN OF CARE
Problem: PHYSICAL THERAPY ADULT  Goal: Performs mobility at highest level of function for planned discharge setting  See evaluation for individualized goals  Description: Treatment/Interventions: Functional transfer training, LE strengthening/ROM, Elevations, Therapeutic exercise, Endurance training, Bed mobility, Gait training, OT  Equipment Recommended: Walker(at this time)       See flowsheet documentation for full assessment, interventions and recommendations  Note: Prognosis: Good  Problem List: Decreased strength, Decreased endurance, Impaired balance, Decreased mobility, Orthopedic restrictions, Pain  Assessment: Pt is a 78 y o  male seen for PT evaluation s/p admit to [facility] on 2/16/2021  Pt was admitted with a primary dx of: sepsis and congenital talipes equinovarus s/p "INCISION AND DRAINAGE (I&D) EXTREMITY (Right), REMOVAL EXTERNAL FIXATOR (Right)"  PT now consulted for assessment of mobility and d/c needs  Pt with active PT evaluation orders and NWBing orders for RLE  Pts current comorbidities effecting treatment include:  h/o  Arthritis, Atrial fibrillation ([address] ), Benign prostate hyperplasia (04/2002), Cellulitis, Colon polyp (2006), Diabetes mellitus ([address] ), Hearing aid worn, Hearing loss, History of clubfoot, History of pneumonia, History of TIA (transient ischemic attack) (04/25/2017), Hyperlipidemia (5/15/2014), Hypertension, Infectious viral hepatitis, Irregular heart beat, Neuropathy, Osteomyelitis ([address] ), Right middle lobe pulmonary nodule (11/6/2018), Seasickness, Teeth missing, Type 2 diabetes mellitus with diabetic neuropathy ([address] ) (11/6/2018), Wears glasses, and Wound, open   Pts current clinical presentation is Unstable/ Unpredictable (high complexity) due to Ongoing medical management for primary dx, Increased reliance on more restrictive AD compared to baseline, Decreased activity tolerance compared to baseline, Fall risk, Increased assistance needed from caregiver at current time, s/p surgical intervention  Prior to admission, pt was independent with ADLs and ambulating with crutches PTA  Pt lives with his wife in a 1 story mobile home with ramped entrance  Pt also reports using a w/c for mobility at times  Upon evaluation, pt currently is requiring supervision for bed mobility; modAx2 for transfers and modAx2 for ambulation ("hop to") 10 ft w/ RW  Pt presents at PT eval functioning below baseline and currently w/ overall mobility deficits 2* to: RLE weakness, decreased ROM, impaired balance, decreased endurance, gait deviations, pain, decreased activity tolerance compared to baseline, decreased functional mobility tolerance compared to baseline, fall risk, orthopedic restrictions  Pt currently at a fall risk 2* to impairments listed above  Pt will continue to benefit from skilled acute PT interventions to address stated impairments; to maximize functional mobility; for ongoing pt/ family training; and DME needs  See below for continued assessment     Barriers to Discharge Comments: At conclusion of PT session pt returned BTB with phone and call bell within reach  Pt denies any further questions at this time  The patient's AM-PAC Basic Mobility Inpatient Short Form Raw Score is 14, Standardized Score is 35 55  A standardized score less than 42 9 suggests the patient may benefit from discharge to post-acute rehabilitation services  However, anticipate pt will progress quickly to allow for d/c home as he has good social support and an accessible home  Please also refer to the recommendation of the Physical Therapist for safe discharge planning  Recommending home with social support and HHPT upon d/c    PT Discharge Recommendation: Return to previous environment with social support, Home with skilled therapy(pending progress)          See flowsheet documentation for full assessment

## 2021-02-20 NOTE — PROGRESS NOTES
Podiatry - Progress Note  Patient: Isidoro Unger  78 y o  male   MRN: [id number]  PCP: Landon Shelton MD  Unit/Bed#: [facility] 847-25 Encounter: [id number]  Date Of Visit: 21    ASSESSMENT:    Isidoro Unger  is a 78 y o  male with:    1  Right foot hematoma  - S/p exfix removal, I&D, fasciotomy (DOS 21)  2  Congenital talipes equinovarus right foot             - s/p midfoot arthrodesis, exfix application (DOS 41)  3  T2Dm, last A1C 7 8 %  4  HTN  5  Altered mental status  6  Constipation  7  atrial fibrillation  8  Sepsis on admission - resolved    PLAN:    · Tentative plan for OR with Dr Kem Gabriel on Monday  Right hallux continues to remain dusky with noted ischemic changes to the dorsal-distal forefoot and 2nd digit  Will continue to monitor areas of demarcation and tissue quality daily  · Preliminary intraoperative cultures showed Enterobacter and Enterococcus, antibiotics per ID  · Patient continues to remain afebrile without leukocytosis  · Elevation on green foam wedges or pillows when non-ambulatory  · Appreciate consulting services for recommendations and management  Antibiotics started:  Zosyn  Pharmacologic VTE Prophylaxis: Eiliquis  Mechanical VTE Prophylaxis: sequential compression device   Weightbearing status: Strict NWB to RLE     Disposition:  Patient requires continued stay for reasons stated above  SUBJECTIVE:     The patient was seen, evaluated, and assessed at bedside today  The patient was awake, alert, and in no acute distress  No acute events overnight  The patient reports no complaints at this time, states he has been talking to his wife about his decision  Patient denies N/V/F/chills/SOB/CP        OBJECTIVE:     Vitals:   /85   Pulse 87   Temp 99 6 °F (37 6 °C) (Oral)   Resp 16   Ht 6' 4" (1 93 m)   Wt 109 kg (240 lb 15 4 oz)   SpO2 97%   BMI 29 33 kg/m²     Temp (24hrs), Av 5 °F (37 5 °C), Min:99 °F (37 2 °C), Max:99 9 °F (37 7 °C)      Physical Exam: General: Alert, cooperative and no distress  Lungs: Non labored breathing  Abdomen: Soft, non-tender  Lower extremity exam:  Cardiovascular status at baseline  Neurological status at baseline  Musculoskeletal status at baseline  No calf tenderness noted  Incisions noted over the dorsal medial, central, lateral, plantar medial arch appear fibrogranular with small areas of necrotic tissue, however tissue was noted to be mostly granular and viable  There was noted ischemic changes to the distal/dorsal aspect of the forefoot as well as the proximal 2nd digit  The hallux continues to remain dusky with delayed capillary fill time  Additional Data:     Labs:    Results from last 7 days   Lab Units 02/20/21  0546  02/16/21  1648   WBC Thousand/uL 9 89   < > 14 65*   HEMOGLOBIN g/dL 10 6*   < > 12 3   HEMATOCRIT % 32 3*   < > 37 3   PLATELETS Thousands/uL 261   < > 186   NEUTROS PCT %  --   --  88*   LYMPHS PCT %  --   --  5*   MONOS PCT %  --   --  6   EOS PCT %  --   --  0    < > = values in this interval not displayed  Results from last 7 days   Lab Units 02/20/21  0546  02/16/21  1648   POTASSIUM mmol/L 3 6   < > 3 7   CHLORIDE mmol/L 109*   < > 106   CO2 mmol/L 25   < > 22   BUN mg/dL 10   < > 12   CREATININE mg/dL 0 73   < > 1 05   CALCIUM mg/dL 8 4   < > 8 8   ALK PHOS U/L  --   --  120*   ALT U/L  --   --  61   AST U/L  --   --  52*    < > = values in this interval not displayed  Results from last 7 days   Lab Units 02/18/21  0521   INR  1 36*       * I Have Reviewed All Lab Data Listed Above  Recent Cultures (last 7 days):     Results from last 7 days   Lab Units 02/17/21  1636 02/17/21  1634 02/16/21  1648 02/16/21  1630   BLOOD CULTURE   --   --  No Growth at 72 hrs  No Growth at 72 hrs     GRAM STAIN RESULT  1+ Polys*  1+ Gram negative rods* 1+ Polys  No bacteria seen  --   --    WOUND CULTURE  3+ Growth of Enterobacter cloacae complex*  1+ Growth of  2+ Growth of Enterobacter cloacae complex*  1+ Growth of Enterococcus faecalis*  --   --      Results from last 7 days   Lab Units 02/17/21  1636 02/17/21  1634   ANAEROBIC CULTURE  No anaerobes isolated No anaerobes isolated       Imaging: I have personally reviewed pertinent films in PACS  EKG, Pathology, and Other Studies: I have personally reviewed pertinent reports  ** Please Note: Portions of the record may have been created with voice recognition software  Occasional wrong word or "sound a like" substitutions may have occurred due to the inherent limitations of voice recognition software  Read the chart carefully and recognize, using context, where substitutions have occurred   **

## 2021-02-20 NOTE — PROGRESS NOTES
INTERNAL MEDICINE RESIDENCY PROGRESS NOTE     Name: Grace Hudson  Age & Sex: 78 y o  male   MRN: [id number]  Unit/Bed#: [facility] 624-01   Encounter: [id number]  Team: SOD Team B     PATIENT INFORMATION     Name: Grace Hudson  Age & Sex: 78 y o  male   MRN: [id number]  Hospital Stay Days: 4    ASSESSMENT/PLAN     Principal Problem:    Sepsis [facility])  Active Problems:    Hyperlipidemia    Type 2 diabetes mellitus with diabetic neuropathy (HCC)    Atrial fibrillation with RVR (HCC)    Essential hypertension    Congenital talipes equinovarus, right foot    Post-operative state    Cellulitis of extremity    Abscess    Drug-induced constipation      Drug-induced constipation  Assessment & Plan  Improving though still with small BMs   - Encourage PO intake  - Continue senna, miralax    Essential hypertension  Assessment & Plan  - patient has history of longstanding hypertension on lisinopril 40mg daily and Cardizem 120mg BID  - continue lisinopril and Cardizem  - trend BMP     Atrial fibrillation with RVR ([facility] [address] )  Assessment & Plan  Patient has longstanding history of atrial fibrillation on Cardizem 120 BID for rate control and Eliquis for anticoagulation  Does not follow with cardiology outpatient  - Continue eliquis   - no indication to trend INR unless useful to surgical team  - continue diltiazem 120 BID for rate control, goal HR <100   - given lopressor 5mg x 1 on 2/18 for RVR (asymptomatic)   - HR overall improving with prior self limited episodes of RVR, asymptomatic   - option to increase to 180 BID if he requires additional rate control, would defer for now  - telemetry given continued RVR    Type 2 diabetes mellitus with diabetic neuropathy ([facility] )  Assessment & Plan  On sitagliptin, metformin, and glimepiride outpatient: all held on admission  SSI ordered, some hyperglycemia noted this morning    Will add on 10 units of Lantus tonight for better glycemic control in the setting of infection  Goal blood glucose 140-180    Hyperlipidemia  Assessment & Plan  Continue atorvastatin 40mg    * Sepsis ([address] )  Assessment & Plan  Due to infected hardware/hematoma of R foot, now status post removal and washout, POD#3  - per podiatry: monitor for demarcation prior to amputation  - blood cultures negative x 72 hours  - ID consulted  - Zosyn        SUBJECTIVE     Patient seen and examined  No acute events overnight  Patient endorses some mild discomfort in his right foot  Otherwise has no acute concerns this morning     OBJECTIVE     Vitals:    21 2238 21 0240 21 0806 21 1516   BP: 122/81 130/85 159/91 125/86   Pulse: 92 87 81    Resp:  16 16 18   Temp: 99 9 °F (37 7 °C) 99 6 °F (37 6 °C) 97 9 °F (36 6 °C) 98 5 °F (36 9 °C)   TempSrc:  Oral     SpO2: 100% 97% 97%    Weight:       Height:          Temperature:   Temp (24hrs), Av 1 °F (37 3 °C), Min:97 9 °F (36 6 °C), Max:99 9 °F (37 7 °C)    Temperature: 98 5 °F (36 9 °C)  Intake & Output:  I/O        07 -  0700  07 -  0700  07 -  0700    P  O  680 1120     I V  (mL/kg) 1112 5 (10 2) 1340 (12 3)     IV Piggyback 600 250     Total Intake(mL/kg) 2392 5 (21 9) 2710 (24 9)     Urine (mL/kg/hr) 1750 (0 7) 2725 (1) 225 (2 1)    Stool 0 0     Total Output 1750 2725 225    Net +642 5 -15 -225           Unmeasured Urine Occurrence 3 x 3 x     Unmeasured Stool Occurrence 1 x 0 x         Weights:   IBW: 86 8 kg    Body mass index is 29 33 kg/m²  Weight (last 2 days)     None        Physical Exam  Constitutional:       Appearance: Normal appearance  He is not ill-appearing  HENT:      Head: Normocephalic and atraumatic  Eyes:      General: No scleral icterus  Right eye: No discharge  Left eye: No discharge  Cardiovascular:      Rate and Rhythm: Normal rate and regular rhythm  Heart sounds: No murmur  No friction rub     Pulmonary:      Effort: Pulmonary effort is normal       Breath sounds: Normal breath sounds  No wheezing or rales  Abdominal:      General: Abdomen is flat  There is no distension  Palpations: Abdomen is soft  Tenderness: There is no abdominal tenderness  Musculoskeletal:         General: No swelling, tenderness or deformity  Skin:     General: Skin is warm and dry  Findings: No erythema  Comments: Dusky great toe of the right foot, right foot bandaged   Neurological:      Mental Status: He is alert and oriented to person, place, and time  Mental status is at baseline  Motor: No weakness  Psychiatric:         Mood and Affect: Mood normal          Behavior: Behavior normal        LABORATORY DATA     Labs: I have personally reviewed pertinent reports  Results from last 7 days   Lab Units 02/20/21  0546 02/19/21  0519 02/18/21  0521 02/16/21  1648   WBC Thousand/uL 9 89 9 06 8 26   < > 14 65*   HEMOGLOBIN g/dL 10 6* 10 0* 10 6*   < > 12 3   HEMATOCRIT % 32 3* 31 4* 33 5*   < > 37 3   PLATELETS Thousands/uL 261 219 190   < > 186   NEUTROS PCT %  --   --   --   --  88*   MONOS PCT %  --   --   --   --  6    < > = values in this interval not displayed  Results from last 7 days   Lab Units 02/20/21  0546 02/19/21  0519 02/18/21  0521 02/16/21  1648   POTASSIUM mmol/L 3 6 3 9 3 4*   < > 3 7   CHLORIDE mmol/L 109* 109* 110*   < > 106   CO2 mmol/L 25 24 26   < > 22   BUN mg/dL 10 11 11   < > 12   CREATININE mg/dL 0 73 0 74 0 87   < > 1 05   CALCIUM mg/dL 8 4 8 5 8 6   < > 8 8   ALK PHOS U/L  --   --   --   --  120*   ALT U/L  --   --   --   --  61   AST U/L  --   --   --   --  52*    < > = values in this interval not displayed                Results from last 7 days   Lab Units 02/18/21  0521 02/17/21  0752 02/16/21  1648   INR  1 36* 1 47* 1 48*   PTT seconds  --   --  37     Results from last 7 days   Lab Units 02/16/21  1855   LACTIC ACID mmol/L 1 2     Results from last 7 days   Lab Units 02/16/21  1648   TROPONIN I ng/mL <0 02       IMAGING & DIAGNOSTIC TESTING Radiology Results: I have personally reviewed pertinent reports  Xr Foot 2 Vw Right    Result Date: 2/17/2021  Impression: Findings suspicious for infection involving the 4th and 5th tarsometatarsal joints, less conspicuous previously  Status post surgery Workstation performed: [id number]     Xr Foot Right 3+ Views    Result Date: 2/18/2021  Impression: Removal of the external fixation device  Otherwise stable alignment of the post surgical changes  Workstation performed: [id number]     Other Diagnostic Testing: I have personally reviewed pertinent reports      ACTIVE MEDICATIONS     Current Facility-Administered Medications   Medication Dose Route Frequency    acetaminophen (TYLENOL) tablet 650 mg  650 mg Oral Q6H PRN    apixaban (ELIQUIS) tablet 5 mg  5 mg Oral BID    atorvastatin (LIPITOR) tablet 40 mg  40 mg Oral Daily    diltiazem (CARDIZEM) tablet 120 mg  120 mg Oral BID    HYDROmorphone (DILAUDID) injection 0 5 mg  0 5 mg Intravenous Q4H PRN    insulin glargine (LANTUS) subcutaneous injection 10 Units 0 1 mL  10 Units Subcutaneous HS    insulin lispro (HumaLOG) 100 units/mL subcutaneous injection 1-6 Units  1-6 Units Subcutaneous 4x Daily (AC & HS)    lisinopril (ZESTRIL) tablet 40 mg  40 mg Oral Daily    oxyCODONE (ROXICODONE) immediate release tablet 10 mg  10 mg Oral Q4H PRN    oxyCODONE (ROXICODONE) IR tablet 5 mg  5 mg Oral Q4H PRN    piperacillin-tazobactam (ZOSYN) 4 5 g in sodium chloride 0 9 % 100 mL IVPB  4 5 g Intravenous Q6H    polyethylene glycol (MIRALAX) packet 17 g  17 g Oral Daily    senna-docusate sodium (SENOKOT S) 8 6-50 mg per tablet 1 tablet  1 tablet Oral BID    sodium chloride 0 9 % infusion  75 mL/hr Intravenous Continuous       VTE Pharmacologic Prophylaxis: Reason for no pharmacologic prophylaxis On Eliquis  VTE Mechanical Prophylaxis: reason for no mechanical VTE prophylaxis On Eliquis    Portions of the record may have been created with voice recognition software  Occasional wrong word or "sound a like" substitutions may have occurred due to the inherent limitations of voice recognition software    Read the chart carefully and recognize, using context, where substitutions have occurred   ==  Carson Szymanski, [facility]  Internal Medicine Residency PGY-3

## 2021-02-20 NOTE — PLAN OF CARE
Problem: Potential for Falls  Goal: Patient will remain free of falls  Description: INTERVENTIONS:  - Assess patient frequently for physical needs  -  Identify cognitive and physical deficits and behaviors that affect risk of falls    -  [place] fall precautions as indicated by assessment   - Educate patient/family on patient safety including physical limitations  - Instruct patient to call for assistance with activity based on assessment  - Modify environment to reduce risk of injury  - Consider OT/PT consult to assist with strengthening/mobility  Outcome: Progressing     Problem: Prexisting or High Potential for Compromised Skin Integrity  Goal: Skin integrity is maintained or improved  Description: INTERVENTIONS:  - Identify patients at risk for skin breakdown  - Assess and monitor skin integrity  - Assess and monitor nutrition and hydration status  - Monitor labs   - Assess for incontinence   - Turn and reposition patient  - Assist with mobility/ambulation  - Relieve pressure over bony prominences  - Avoid friction and shearing  - Provide appropriate hygiene as needed including keeping skin clean and dry  - Evaluate need for skin moisturizer/barrier cream  - Collaborate with interdisciplinary team   - Patient/family teaching  - Consider wound care consult   Outcome: Progressing     Problem: PAIN - ADULT  Goal: Verbalizes/displays adequate comfort level or baseline comfort level  Description: Interventions:  - Encourage patient to monitor pain and request assistance  - Assess pain using appropriate pain scale  - Administer analgesics based on type and severity of pain and evaluate response  - Implement non-pharmacological measures as appropriate and evaluate response  - Consider cultural and social influences on pain and pain management  - Notify physician/advanced practitioner if interventions unsuccessful or patient reports new pain  Outcome: Progressing     Problem: INFECTION - ADULT  Goal: Absence or prevention of progression during hospitalization  Description: INTERVENTIONS:  - Assess and monitor for signs and symptoms of infection  - Monitor lab/diagnostic results  - Monitor all insertion sites, i e  indwelling lines, tubes, and drains  - Monitor endotracheal if appropriate and nasal secretions for changes in amount and color  - [place] appropriate cooling/warming therapies per order  - Administer medications as ordered  - Instruct and encourage patient and family to use good hand hygiene technique  - Identify and instruct in appropriate isolation precautions for identified infection/condition  Outcome: Progressing  Goal: Absence of fever/infection during neutropenic period  Description: INTERVENTIONS:  - Monitor WBC    Outcome: Progressing     Problem: SAFETY ADULT  Goal: Patient will remain free of falls  Description: INTERVENTIONS:  - Assess patient frequently for physical needs  -  Identify cognitive and physical deficits and behaviors that affect risk of falls    -  [place] fall precautions as indicated by assessment   - Educate patient/family on patient safety including physical limitations  - Instruct patient to call for assistance with activity based on assessment  - Modify environment to reduce risk of injury  - Consider OT/PT consult to assist with strengthening/mobility  Outcome: Progressing  Goal: Maintain or return to baseline ADL function  Description: INTERVENTIONS:  -  Assess patient's ability to carry out ADLs; assess patient's baseline for ADL function and identify physical deficits which impact ability to perform ADLs (bathing, care of mouth/teeth, toileting, grooming, dressing, etc )  - Assess/evaluate cause of self-care deficits   - Assess range of motion  - Assess patient's mobility; develop plan if impaired  - Assess patient's need for assistive devices and provide as appropriate  - Encourage maximum independence but intervene and supervise when necessary  - Involve family in performance of ADLs  - Assess for home care needs following discharge   - Consider OT consult to assist with ADL evaluation and planning for discharge  - Provide patient education as appropriate  Outcome: Progressing  Goal: Maintain or return mobility status to optimal level  Description: INTERVENTIONS:  - Assess patient's baseline mobility status (ambulation, transfers, stairs, etc )    - Identify cognitive and physical deficits and behaviors that affect mobility  - Identify mobility aids required to assist with transfers and/or ambulation (gait belt, sit-to-stand, lift, walker, cane, etc )  - [place] fall precautions as indicated by assessment  - Record patient progress and toleration of activity level on Mobility SBAR; progress patient to next Phase/Stage  - Instruct patient to call for assistance with activity based on assessment  - Consider rehabilitation consult to assist with strengthening/weightbearing, etc   Outcome: Progressing     Problem: DISCHARGE PLANNING  Goal: Discharge to home or other facility with appropriate resources  Description: INTERVENTIONS:  - Identify barriers to discharge w/patient and caregiver  - Arrange for needed discharge resources and transportation as appropriate  - Identify discharge learning needs (meds, wound care, etc )  - Arrange for interpretive services to assist at discharge as needed  - Refer to Case Management Department for coordinating discharge planning if the patient needs post-hospital services based on physician/advanced practitioner order or complex needs related to functional status, cognitive ability, or social support system  Outcome: Progressing

## 2021-02-21 LAB
ANION GAP SERPL CALCULATED.3IONS-SCNC: 5 MMOL/L (ref 4–13)
BACTERIA BLD CULT: NORMAL
BACTERIA BLD CULT: NORMAL
BUN SERPL-MCNC: 10 MG/DL (ref 5–25)
CALCIUM SERPL-MCNC: 8.9 MG/DL (ref 8.3–10.1)
CHLORIDE SERPL-SCNC: 110 MMOL/L (ref 100–108)
CO2 SERPL-SCNC: 27 MMOL/L (ref 21–32)
CREAT SERPL-MCNC: 0.93 MG/DL (ref 0.6–1.3)
ERYTHROCYTE [DISTWIDTH] IN BLOOD BY AUTOMATED COUNT: 14.8 % (ref 11.6–15.1)
GFR SERPL CREATININE-BSD FRML MDRD: 84 ML/MIN/1.73SQ M
GLUCOSE SERPL-MCNC: 173 MG/DL (ref 65–140)
GLUCOSE SERPL-MCNC: 207 MG/DL (ref 65–140)
GLUCOSE SERPL-MCNC: 218 MG/DL (ref 65–140)
GLUCOSE SERPL-MCNC: 225 MG/DL (ref 65–140)
GLUCOSE SERPL-MCNC: 247 MG/DL (ref 65–140)
HCT VFR BLD AUTO: 36.5 % (ref 36.5–49.3)
HGB BLD-MCNC: 11.8 G/DL (ref 12–17)
MCH RBC QN AUTO: 30.9 PG (ref 26.8–34.3)
MCHC RBC AUTO-ENTMCNC: 32.3 G/DL (ref 31.4–37.4)
MCV RBC AUTO: 96 FL (ref 82–98)
PLATELET # BLD AUTO: 360 THOUSANDS/UL (ref 149–390)
PMV BLD AUTO: 10 FL (ref 8.9–12.7)
POTASSIUM SERPL-SCNC: 3.7 MMOL/L (ref 3.5–5.3)
RBC # BLD AUTO: 3.82 MILLION/UL (ref 3.88–5.62)
SODIUM SERPL-SCNC: 142 MMOL/L (ref 136–145)
WBC # BLD AUTO: 9.16 THOUSAND/UL (ref 4.31–10.16)

## 2021-02-21 PROCEDURE — 82948 REAGENT STRIP/BLOOD GLUCOSE: CPT

## 2021-02-21 PROCEDURE — 80048 BASIC METABOLIC PNL TOTAL CA: CPT | Performed by: STUDENT IN AN ORGANIZED HEALTH CARE EDUCATION/TRAINING PROGRAM

## 2021-02-21 PROCEDURE — 99024 POSTOP FOLLOW-UP VISIT: CPT | Performed by: PODIATRIST

## 2021-02-21 PROCEDURE — 99233 SBSQ HOSP IP/OBS HIGH 50: CPT | Performed by: INTERNAL MEDICINE

## 2021-02-21 PROCEDURE — 85027 COMPLETE CBC AUTOMATED: CPT | Performed by: STUDENT IN AN ORGANIZED HEALTH CARE EDUCATION/TRAINING PROGRAM

## 2021-02-21 RX ORDER — DILTIAZEM HYDROCHLORIDE 60 MG/1
120 TABLET, FILM COATED ORAL EVERY 8 HOURS SCHEDULED
Status: DISCONTINUED | OUTPATIENT
Start: 2021-02-21 | End: 2021-02-21

## 2021-02-21 RX ORDER — METOPROLOL TARTRATE 5 MG/5ML
5 INJECTION INTRAVENOUS EVERY 6 HOURS PRN
Status: DISCONTINUED | OUTPATIENT
Start: 2021-02-21 | End: 2021-03-10 | Stop reason: HOSPADM

## 2021-02-21 RX ORDER — POLYETHYLENE GLYCOL 3350 17 G/17G
17 POWDER, FOR SOLUTION ORAL DAILY PRN
Status: DISCONTINUED | OUTPATIENT
Start: 2021-02-21 | End: 2021-03-10 | Stop reason: HOSPADM

## 2021-02-21 RX ORDER — AMOXICILLIN 250 MG
1 CAPSULE ORAL
Status: DISCONTINUED | OUTPATIENT
Start: 2021-02-22 | End: 2021-03-10 | Stop reason: HOSPADM

## 2021-02-21 RX ORDER — POTASSIUM CHLORIDE 20 MEQ/1
40 TABLET, EXTENDED RELEASE ORAL ONCE
Status: COMPLETED | OUTPATIENT
Start: 2021-02-21 | End: 2021-02-21

## 2021-02-21 RX ORDER — METOPROLOL TARTRATE 5 MG/5ML
5 INJECTION INTRAVENOUS ONCE
Status: COMPLETED | OUTPATIENT
Start: 2021-02-21 | End: 2021-02-21

## 2021-02-21 RX ADMIN — METOPROLOL TARTRATE 25 MG: 25 TABLET, FILM COATED ORAL at 12:55

## 2021-02-21 RX ADMIN — APIXABAN 5 MG: 5 TABLET, FILM COATED ORAL at 17:05

## 2021-02-21 RX ADMIN — LISINOPRIL 40 MG: 20 TABLET ORAL at 08:40

## 2021-02-21 RX ADMIN — APIXABAN 5 MG: 5 TABLET, FILM COATED ORAL at 08:40

## 2021-02-21 RX ADMIN — ATORVASTATIN CALCIUM 40 MG: 40 TABLET, FILM COATED ORAL at 08:40

## 2021-02-21 RX ADMIN — METOPROLOL TARTRATE 25 MG: 25 TABLET, FILM COATED ORAL at 21:52

## 2021-02-21 RX ADMIN — INSULIN GLARGINE 10 UNITS: 100 INJECTION, SOLUTION SUBCUTANEOUS at 21:52

## 2021-02-21 RX ADMIN — METOROPROLOL TARTRATE 5 MG: 5 INJECTION, SOLUTION INTRAVENOUS at 08:40

## 2021-02-21 RX ADMIN — PIPERACILLIN AND TAZOBACTAM 4.5 G: 36; 4.5 INJECTION, POWDER, FOR SOLUTION INTRAVENOUS at 22:09

## 2021-02-21 RX ADMIN — PIPERACILLIN AND TAZOBACTAM 4.5 G: 36; 4.5 INJECTION, POWDER, FOR SOLUTION INTRAVENOUS at 17:05

## 2021-02-21 RX ADMIN — METOROPROLOL TARTRATE 5 MG: 5 INJECTION, SOLUTION INTRAVENOUS at 22:09

## 2021-02-21 RX ADMIN — PIPERACILLIN AND TAZOBACTAM 4.5 G: 36; 4.5 INJECTION, POWDER, FOR SOLUTION INTRAVENOUS at 12:55

## 2021-02-21 RX ADMIN — POTASSIUM CHLORIDE 40 MEQ: 1500 TABLET, EXTENDED RELEASE ORAL at 12:55

## 2021-02-21 RX ADMIN — PIPERACILLIN AND TAZOBACTAM 4.5 G: 36; 4.5 INJECTION, POWDER, FOR SOLUTION INTRAVENOUS at 05:14

## 2021-02-21 RX ADMIN — SODIUM CHLORIDE 75 ML/HR: 0.9 INJECTION, SOLUTION INTRAVENOUS at 15:55

## 2021-02-21 RX ADMIN — DILTIAZEM HYDROCHLORIDE 120 MG: 60 TABLET, FILM COATED ORAL at 08:39

## 2021-02-21 NOTE — PROGRESS NOTES
Progress Note - Infectious Disease   Koki Patricia  78 y o  male MRN: [id number]  Unit/Bed#: [facility] 624-01 Encounter: [id number]      Impression/Plan:  1- severe sepsis, POA:   Fever, tachycardia, leukocytosis, lactic acidosis, noted on admission   Suspect severe sepsis secondary to surgical site infection, with infected hematoma involving right foot   Concern for hardware infection     Blood culture remains negative so far   No other infectious focus identified based on review of system and physical exam   Patient has clinically improved  -  antibiotics as below  -  supportive treatment as per Internal Medicine team  -  follow-up final result of blood culture collected on admission     2- secondarily infected hematoma with presumptive hardware infection:  Patient was taken to operation room 2/17/2021 where he underwent external fixator removal, hematoma evacuation, fasciotomy   Small amount of purulence noted   Wound cultures collected intraoperatively from lateral and medial aspect of the foot both growing Enterobacter cloaca   E faecalis also growing in 1 operative culture specimen  - continue Zosyn for now at current dose  - final antibiotic plan will depend on extent of amputation/ surgical cure and hardware removal planned once ischemia demarcates by Podiatry   Once amputation occurs, and as long as patient is afebrile and hemodynamically stable, will probably stop antibiotics altogether       3- diabetes mellitus type 2, A1c 7 8%   Risk factor for infectious complications as mentioned above  -  management as per primary service team     4- atrial fibrillation, on anticoagulation:  Anticoagulation probably caused postoperative bleeding and thus hematoma formation as mentioned above    5-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  Possible false-positive    Consideration for the possibility of a new HIV diagnosis  - follow-up confirmatory differentiation assay  - follow up HIV RNA    Discussed the above management plan in detail with the primary service and Podiatry    Antibiotics:  Zosyn 3  Total antibiotics 6    Subjective:  Patient has no fever, chills, sweats; no nausea, vomiting, diarrhea; no cough, shortness of breath; no increased pain  No new symptoms  He is anxious to get his surgery done  Objective:  Vitals:  Temp:  [98 5 °F (36 9 °C)-99 4 °F (37 4 °C)] 98 6 °F (37 °C)  Resp:  [16-18] 18  BP: (125-154)/(86-93) 154/93  Temp (24hrs), Av 8 °F (37 1 °C), Min:98 5 °F (36 9 °C), Max:99 4 °F (37 4 °C)  Current: Temperature: 98 6 °F (37 °C)    Physical Exam:   General Appearance:  Alert, interactive, nontoxic, no acute distress  Throat: Oropharynx moist without lesions  Lungs:   Clear to auscultation bilaterally; no wheezes, rhonchi or rales; respirations unlabored   Heart:  RRR; no murmur, rub or gallop   Abdomen:   Soft, non-tender, non-distended, positive bowel sounds  Extremities: No clubbing, cyanosis  Right foot dressed with dry dressing in place  No spreading erythema  Skin: No new rashes or lesions  No draining wounds noted         Labs, Imaging, & Other studies:   All pertinent labs and imaging studies were personally reviewed  Results from last 7 days   Lab Units 21  1002 21  0546 21  0519   WBC Thousand/uL 9 16 9 89 9 06   HEMOGLOBIN g/dL 11 8* 10 6* 10 0*   PLATELETS Thousands/uL 360 261 219     Results from last 7 days   Lab Units 21  1002 21  0546 21  0519  21  1648   SODIUM mmol/L 142 141 140   < > 137   POTASSIUM mmol/L 3 7 3 6 3 9   < > 3 7   CHLORIDE mmol/L 110* 109* 109*   < > 106   CO2 mmol/L 27 25 24   < > 22   BUN mg/dL 10 10 11   < > 12   CREATININE mg/dL 0 93 0 73 0 74   < > 1 05   EGFR ml/min/1 73sq m 84 95 95   < > 73   CALCIUM mg/dL 8 9 8 4 8 5   < > 8 8   AST U/L  --   --   --   --  52*   ALT U/L  --   --   --   --  61   ALK PHOS U/L  --   --   --   --  120*    < > = values in this interval not displayed  Results from last 7 days   Lab Units 02/18/21  1328 02/17/21  1636 02/17/21  1634 02/16/21  1648 02/16/21  1630   BLOOD CULTURE   --   --   --  No Growth After 4 Days  No Growth After 4 Days     GRAM STAIN RESULT   --  1+ Polys*  1+ Gram negative rods* 1+ Polys  No bacteria seen  --   --    WOUND CULTURE   --  3+ Growth of Enterobacter cloacae complex*  1+ Growth of  2+ Growth of Enterobacter cloacae complex*  1+ Growth of Enterococcus faecalis*  1+ Growth of   --   --    MRSA CULTURE ONLY  No Methicillin Resistant Staphlyococcus aureus (MRSA) isolated  --   --   --   --      Results from last 7 days   Lab Units 02/20/21  1000 02/16/21  1648   PROCALCITONIN ng/ml 5 33* 1 08*

## 2021-02-21 NOTE — PLAN OF CARE
Problem: Potential for Falls  Goal: Patient will remain free of falls  Description: INTERVENTIONS:  - Assess patient frequently for physical needs  -  Identify cognitive and physical deficits and behaviors that affect risk of falls    -  [place] fall precautions as indicated by assessment   - Educate patient/family on patient safety including physical limitations  - Instruct patient to call for assistance with activity based on assessment  - Modify environment to reduce risk of injury  - Consider OT/PT consult to assist with strengthening/mobility  Outcome: Progressing     Problem: Prexisting or High Potential for Compromised Skin Integrity  Goal: Skin integrity is maintained or improved  Description: INTERVENTIONS:  - Identify patients at risk for skin breakdown  - Assess and monitor skin integrity  - Assess and monitor nutrition and hydration status  - Monitor labs   - Assess for incontinence   - Turn and reposition patient  - Assist with mobility/ambulation  - Relieve pressure over bony prominences  - Avoid friction and shearing  - Provide appropriate hygiene as needed including keeping skin clean and dry  - Evaluate need for skin moisturizer/barrier cream  - Collaborate with interdisciplinary team   - Patient/family teaching  - Consider wound care consult   Outcome: Progressing     Problem: PAIN - ADULT  Goal: Verbalizes/displays adequate comfort level or baseline comfort level  Description: Interventions:  - Encourage patient to monitor pain and request assistance  - Assess pain using appropriate pain scale  - Administer analgesics based on type and severity of pain and evaluate response  - Implement non-pharmacological measures as appropriate and evaluate response  - Consider cultural and social influences on pain and pain management  - Notify physician/advanced practitioner if interventions unsuccessful or patient reports new pain  Outcome: Progressing     Problem: INFECTION - ADULT  Goal: Absence or prevention of progression during hospitalization  Description: INTERVENTIONS:  - Assess and monitor for signs and symptoms of infection  - Monitor lab/diagnostic results  - Monitor all insertion sites, i e  indwelling lines, tubes, and drains  - Monitor endotracheal if appropriate and nasal secretions for changes in amount and color  - [place] appropriate cooling/warming therapies per order  - Administer medications as ordered  - Instruct and encourage patient and family to use good hand hygiene technique  - Identify and instruct in appropriate isolation precautions for identified infection/condition  Outcome: Progressing     Problem: SAFETY ADULT  Goal: Patient will remain free of falls  Description: INTERVENTIONS:  - Assess patient frequently for physical needs  -  Identify cognitive and physical deficits and behaviors that affect risk of falls    -  [place] fall precautions as indicated by assessment   - Educate patient/family on patient safety including physical limitations  - Instruct patient to call for assistance with activity based on assessment  - Modify environment to reduce risk of injury  - Consider OT/PT consult to assist with strengthening/mobility  Outcome: Progressing  Goal: Maintain or return to baseline ADL function  Description: INTERVENTIONS:  -  Assess patient's ability to carry out ADLs; assess patient's baseline for ADL function and identify physical deficits which impact ability to perform ADLs (bathing, care of mouth/teeth, toileting, grooming, dressing, etc )  - Assess/evaluate cause of self-care deficits   - Assess range of motion  - Assess patient's mobility; develop plan if impaired  - Assess patient's need for assistive devices and provide as appropriate  - Encourage maximum independence but intervene and supervise when necessary  - Involve family in performance of ADLs  - Assess for home care needs following discharge   - Consider OT consult to assist with ADL evaluation and planning for discharge  - Provide patient education as appropriate  Outcome: Progressing  Goal: Maintain or return mobility status to optimal level  Description: INTERVENTIONS:  - Assess patient's baseline mobility status (ambulation, transfers, stairs, etc )    - Identify cognitive and physical deficits and behaviors that affect mobility  - Identify mobility aids required to assist with transfers and/or ambulation (gait belt, sit-to-stand, lift, walker, cane, etc )  - [place] fall precautions as indicated by assessment  - Record patient progress and toleration of activity level on Mobility SBAR; progress patient to next Phase/Stage  - Instruct patient to call for assistance with activity based on assessment  - Consider rehabilitation consult to assist with strengthening/weightbearing, etc   Outcome: Progressing     Problem: DISCHARGE PLANNING  Goal: Discharge to home or other facility with appropriate resources  Description: INTERVENTIONS:  - Identify barriers to discharge w/patient and caregiver  - Arrange for needed discharge resources and transportation as appropriate  - Identify discharge learning needs (meds, wound care, etc )  - Arrange for interpretive services to assist at discharge as needed  - Refer to Case Management Department for coordinating discharge planning if the patient needs post-hospital services based on physician/advanced practitioner order or complex needs related to functional status, cognitive ability, or social support system  Outcome: Progressing

## 2021-02-21 NOTE — PROGRESS NOTES
Progress Note - Podiatry  Tracey Tejeda  78 y o  male MRN: [id number]  Unit/Bed#: [facility] 624-01 Encounter: [id number]    Assessment:     Tracey Tejeda  is a 78 y o  male with:     1  Right foot hematoma, surgical site infection, ischemia  - S/p exfix removal, I&D, fasciotomy (DOS 2/17/21)  2  Congenital talipes equinovarus right foot             - s/p midfoot arthrodesis, exfix application (DOS 4/69/38)  3  T2Dm, last A1C 7 8 %  4  HTN  5  Altered mental status  6  Constipation  7  atrial fibrillation  8  Sepsis on admission - resolved    Plan:  - Plan for right foot open TMA / wash-out / hardware removal tomorrow 2/22 w/ Dr Kendall Aremndariz  NPO 8am, light surgical diet prior  IVF cont  - ID on board for abx recs  - Elevation on green foam wedges or pillows when non-ambulatory  Appreciate consulting services for recommendations and management  Antibiotics started:  Zosyn (per ID)  VTE Prophylaxis: Eliquis, sequential compression device   Weightbearing status: Strict NWB to RLE  [place]     Disposition:  Patient requires continued stay for reasons stated above  Subjective/Objective   Chief Complaint:   Chief Complaint   Patient presents with    Altered Mental Status     Per EMS pt had recent surgery on his right foot  Today pt had sudden onset of AMS and fevers and was diaphoretic and shaking  Subjective: 78 y o  y/o male was seen and evaluated at bedside  Patient endorses diarrhea s/p stool softener  Patient denies nausea, vomiting, chest pain, shortness of breath, chills, fever  Blood pressure 154/93, pulse 81, temperature 98 6 °F (37 °C), resp  rate 18, height 6' 4" (1 93 m), weight 109 kg (240 lb 15 4 oz), SpO2 97 %  ,Body mass index is 29 33 kg/m²      Invasive Devices     Peripheral Intravenous Line            Peripheral IV 02/20/21 Left Antecubital less than 1 day                Physical Exam:   General: Alert, cooperative and no distress  Lungs: Non labored breathing  Heart: Positive S1, S2  Abdomen: Soft, non-tender  Left le msk, nvs, derm baseline  Right lower Extremity: Ischemic changes to dorsal-distal forefoot, hallux and 2nd toe  Incisions to dorsal medial, central, lateral and plantar medial arch with fibrogranular base, mild serosanguinous drainage  Erythema noted surrounding wounds  Minimal pain  gross nvs baseline  Lab, Imaging and other studies:   CBC: No results found for: WBC, HGB, HCT, MCV, PLT, ADJUSTEDWBC, MCH, MCHC, RDW, MPV, NRBC, CMP: No results found for: SODIUM, K, CL, CO2, ANIONGAP, BUN, CREATININE, GLUCOSE, CALCIUM, AST, ALT, ALKPHOS, PROT, BILITOT, EGFR    Imaging: I have personally reviewed pertinent films in PACS  EKG, Pathology, and Other Studies: I have personally reviewed pertinent reports    VTE Pharmacologic Prophylaxis: Sequential compression device (Venodyne)

## 2021-02-22 ENCOUNTER — ANESTHESIA (INPATIENT)
Dept: PERIOP | Facility: HOSPITAL | Age: 71
DRG: 856 | End: 2021-02-22
Payer: COMMERCIAL

## 2021-02-22 ENCOUNTER — APPOINTMENT (INPATIENT)
Dept: RADIOLOGY | Facility: HOSPITAL | Age: 71
DRG: 856 | End: 2021-02-22
Payer: COMMERCIAL

## 2021-02-22 ENCOUNTER — ANESTHESIA EVENT (INPATIENT)
Dept: PERIOP | Facility: HOSPITAL | Age: 71
DRG: 856 | End: 2021-02-22
Payer: COMMERCIAL

## 2021-02-22 VITALS — HEART RATE: 103 BPM

## 2021-02-22 PROBLEM — K59.03 DRUG-INDUCED CONSTIPATION: Status: RESOLVED | Noted: 2021-02-16 | Resolved: 2021-02-22

## 2021-02-22 LAB
ANION GAP SERPL CALCULATED.3IONS-SCNC: 6 MMOL/L (ref 4–13)
BUN SERPL-MCNC: 8 MG/DL (ref 5–25)
CALCIUM SERPL-MCNC: 8.8 MG/DL (ref 8.3–10.1)
CHLORIDE SERPL-SCNC: 112 MMOL/L (ref 100–108)
CO2 SERPL-SCNC: 24 MMOL/L (ref 21–32)
CREAT SERPL-MCNC: 0.82 MG/DL (ref 0.6–1.3)
ERYTHROCYTE [DISTWIDTH] IN BLOOD BY AUTOMATED COUNT: 14.5 % (ref 11.6–15.1)
GFR SERPL CREATININE-BSD FRML MDRD: 91 ML/MIN/1.73SQ M
GLUCOSE SERPL-MCNC: 158 MG/DL (ref 65–140)
GLUCOSE SERPL-MCNC: 160 MG/DL (ref 65–140)
GLUCOSE SERPL-MCNC: 166 MG/DL (ref 65–140)
GLUCOSE SERPL-MCNC: 172 MG/DL (ref 65–140)
GLUCOSE SERPL-MCNC: 183 MG/DL (ref 65–140)
GLUCOSE SERPL-MCNC: 278 MG/DL (ref 65–140)
HCT VFR BLD AUTO: 34.9 % (ref 36.5–49.3)
HGB BLD-MCNC: 11.1 G/DL (ref 12–17)
MCH RBC QN AUTO: 30.2 PG (ref 26.8–34.3)
MCHC RBC AUTO-ENTMCNC: 31.8 G/DL (ref 31.4–37.4)
MCV RBC AUTO: 95 FL (ref 82–98)
PLATELET # BLD AUTO: 387 THOUSANDS/UL (ref 149–390)
PMV BLD AUTO: 10.2 FL (ref 8.9–12.7)
POTASSIUM SERPL-SCNC: 3.8 MMOL/L (ref 3.5–5.3)
RBC # BLD AUTO: 3.68 MILLION/UL (ref 3.88–5.62)
SODIUM SERPL-SCNC: 142 MMOL/L (ref 136–145)
WBC # BLD AUTO: 9.89 THOUSAND/UL (ref 4.31–10.16)

## 2021-02-22 PROCEDURE — 0QPN34Z REMOVAL OF INTERNAL FIXATION DEVICE FROM RIGHT METATARSAL, PERCUTANEOUS APPROACH: ICD-10-PCS | Performed by: PODIATRIST

## 2021-02-22 PROCEDURE — 73630 X-RAY EXAM OF FOOT: CPT

## 2021-02-22 PROCEDURE — 82948 REAGENT STRIP/BLOOD GLUCOSE: CPT

## 2021-02-22 PROCEDURE — 0Y6M0ZC DETACHMENT AT RIGHT FOOT, PARTIAL 3RD RAY, OPEN APPROACH: ICD-10-PCS | Performed by: PODIATRIST

## 2021-02-22 PROCEDURE — 99232 SBSQ HOSP IP/OBS MODERATE 35: CPT | Performed by: INTERNAL MEDICINE

## 2021-02-22 PROCEDURE — 85027 COMPLETE CBC AUTOMATED: CPT | Performed by: STUDENT IN AN ORGANIZED HEALTH CARE EDUCATION/TRAINING PROGRAM

## 2021-02-22 PROCEDURE — 93005 ELECTROCARDIOGRAM TRACING: CPT

## 2021-02-22 PROCEDURE — 0Y6M0ZF DETACHMENT AT RIGHT FOOT, PARTIAL 5TH RAY, OPEN APPROACH: ICD-10-PCS | Performed by: PODIATRIST

## 2021-02-22 PROCEDURE — 88305 TISSUE EXAM BY PATHOLOGIST: CPT | Performed by: PATHOLOGY

## 2021-02-22 PROCEDURE — 0Y6M0ZB DETACHMENT AT RIGHT FOOT, PARTIAL 2ND RAY, OPEN APPROACH: ICD-10-PCS | Performed by: PODIATRIST

## 2021-02-22 PROCEDURE — 80048 BASIC METABOLIC PNL TOTAL CA: CPT | Performed by: STUDENT IN AN ORGANIZED HEALTH CARE EDUCATION/TRAINING PROGRAM

## 2021-02-22 PROCEDURE — 20680 REMOVAL OF IMPLANT DEEP: CPT | Performed by: PODIATRIST

## 2021-02-22 PROCEDURE — 99024 POSTOP FOLLOW-UP VISIT: CPT | Performed by: PODIATRIST

## 2021-02-22 PROCEDURE — 0Y6M0ZD DETACHMENT AT RIGHT FOOT, PARTIAL 4TH RAY, OPEN APPROACH: ICD-10-PCS | Performed by: PODIATRIST

## 2021-02-22 PROCEDURE — 88311 DECALCIFY TISSUE: CPT | Performed by: PATHOLOGY

## 2021-02-22 PROCEDURE — 0Y6M0Z9 DETACHMENT AT RIGHT FOOT, PARTIAL 1ST RAY, OPEN APPROACH: ICD-10-PCS | Performed by: PODIATRIST

## 2021-02-22 PROCEDURE — 97535 SELF CARE MNGMENT TRAINING: CPT

## 2021-02-22 PROCEDURE — 73620 X-RAY EXAM OF FOOT: CPT

## 2021-02-22 PROCEDURE — 28805 AMPUTATION THRU METATARSAL: CPT | Performed by: PODIATRIST

## 2021-02-22 RX ORDER — SODIUM CHLORIDE, SODIUM LACTATE, POTASSIUM CHLORIDE, CALCIUM CHLORIDE 600; 310; 30; 20 MG/100ML; MG/100ML; MG/100ML; MG/100ML
125 INJECTION, SOLUTION INTRAVENOUS CONTINUOUS
Status: CANCELLED | OUTPATIENT
Start: 2021-02-22

## 2021-02-22 RX ORDER — MIDAZOLAM HYDROCHLORIDE 2 MG/2ML
INJECTION, SOLUTION INTRAMUSCULAR; INTRAVENOUS AS NEEDED
Status: DISCONTINUED | OUTPATIENT
Start: 2021-02-22 | End: 2021-02-22

## 2021-02-22 RX ORDER — HYDROMORPHONE HCL/PF 1 MG/ML
0.5 SYRINGE (ML) INJECTION
Status: DISCONTINUED | OUTPATIENT
Start: 2021-02-22 | End: 2021-02-22 | Stop reason: HOSPADM

## 2021-02-22 RX ORDER — PROPOFOL 10 MG/ML
INJECTION, EMULSION INTRAVENOUS CONTINUOUS PRN
Status: DISCONTINUED | OUTPATIENT
Start: 2021-02-22 | End: 2021-02-22

## 2021-02-22 RX ORDER — FENTANYL CITRATE 50 UG/ML
INJECTION, SOLUTION INTRAMUSCULAR; INTRAVENOUS AS NEEDED
Status: DISCONTINUED | OUTPATIENT
Start: 2021-02-22 | End: 2021-02-22

## 2021-02-22 RX ORDER — INSULIN GLARGINE 100 [IU]/ML
15 INJECTION, SOLUTION SUBCUTANEOUS
Status: DISCONTINUED | OUTPATIENT
Start: 2021-02-22 | End: 2021-03-10 | Stop reason: HOSPADM

## 2021-02-22 RX ORDER — MEPERIDINE HYDROCHLORIDE 25 MG/ML
12.5 INJECTION INTRAMUSCULAR; INTRAVENOUS; SUBCUTANEOUS ONCE
Status: DISCONTINUED | OUTPATIENT
Start: 2021-02-22 | End: 2021-02-22 | Stop reason: HOSPADM

## 2021-02-22 RX ORDER — METOPROLOL TARTRATE 50 MG/1
50 TABLET, FILM COATED ORAL EVERY 12 HOURS SCHEDULED
Status: DISCONTINUED | OUTPATIENT
Start: 2021-02-22 | End: 2021-02-23

## 2021-02-22 RX ORDER — PROPOFOL 10 MG/ML
INJECTION, EMULSION INTRAVENOUS AS NEEDED
Status: DISCONTINUED | OUTPATIENT
Start: 2021-02-22 | End: 2021-02-22

## 2021-02-22 RX ORDER — ALBUTEROL SULFATE 2.5 MG/3ML
2.5 SOLUTION RESPIRATORY (INHALATION) ONCE AS NEEDED
Status: DISCONTINUED | OUTPATIENT
Start: 2021-02-22 | End: 2021-02-22 | Stop reason: HOSPADM

## 2021-02-22 RX ORDER — PROMETHAZINE HYDROCHLORIDE 25 MG/ML
12.5 INJECTION, SOLUTION INTRAMUSCULAR; INTRAVENOUS ONCE AS NEEDED
Status: DISCONTINUED | OUTPATIENT
Start: 2021-02-22 | End: 2021-02-22 | Stop reason: HOSPADM

## 2021-02-22 RX ORDER — MAGNESIUM HYDROXIDE 1200 MG/15ML
LIQUID ORAL AS NEEDED
Status: DISCONTINUED | OUTPATIENT
Start: 2021-02-22 | End: 2021-02-22 | Stop reason: HOSPADM

## 2021-02-22 RX ORDER — FENTANYL CITRATE/PF 50 MCG/ML
25 SYRINGE (ML) INJECTION
Status: DISCONTINUED | OUTPATIENT
Start: 2021-02-22 | End: 2021-02-22 | Stop reason: HOSPADM

## 2021-02-22 RX ORDER — LABETALOL 20 MG/4 ML (5 MG/ML) INTRAVENOUS SYRINGE
5
Status: DISCONTINUED | OUTPATIENT
Start: 2021-02-22 | End: 2021-02-22 | Stop reason: HOSPADM

## 2021-02-22 RX ORDER — LIDOCAINE HYDROCHLORIDE 10 MG/ML
0.5 INJECTION, SOLUTION EPIDURAL; INFILTRATION; INTRACAUDAL; PERINEURAL ONCE AS NEEDED
Status: CANCELLED | OUTPATIENT
Start: 2021-02-22

## 2021-02-22 RX ORDER — ONDANSETRON 2 MG/ML
4 INJECTION INTRAMUSCULAR; INTRAVENOUS ONCE AS NEEDED
Status: DISCONTINUED | OUTPATIENT
Start: 2021-02-22 | End: 2021-02-22 | Stop reason: HOSPADM

## 2021-02-22 RX ADMIN — FENTANYL CITRATE 25 MCG: 50 INJECTION INTRAMUSCULAR; INTRAVENOUS at 15:16

## 2021-02-22 RX ADMIN — FENTANYL CITRATE 25 MCG: 50 INJECTION INTRAMUSCULAR; INTRAVENOUS at 16:00

## 2021-02-22 RX ADMIN — FENTANYL CITRATE 25 MCG: 50 INJECTION INTRAMUSCULAR; INTRAVENOUS at 16:46

## 2021-02-22 RX ADMIN — PIPERACILLIN AND TAZOBACTAM 4.5 G: 36; 4.5 INJECTION, POWDER, FOR SOLUTION INTRAVENOUS at 12:41

## 2021-02-22 RX ADMIN — FENTANYL CITRATE 25 MCG: 50 INJECTION INTRAMUSCULAR; INTRAVENOUS at 16:50

## 2021-02-22 RX ADMIN — METOROPROLOL TARTRATE 5 MG: 5 INJECTION, SOLUTION INTRAVENOUS at 19:12

## 2021-02-22 RX ADMIN — METOPROLOL TARTRATE 50 MG: 50 TABLET, FILM COATED ORAL at 21:38

## 2021-02-22 RX ADMIN — PROPOFOL 80 MCG/KG/MIN: 10 INJECTION, EMULSION INTRAVENOUS at 15:14

## 2021-02-22 RX ADMIN — PIPERACILLIN AND TAZOBACTAM 4.5 G: 36; 4.5 INJECTION, POWDER, FOR SOLUTION INTRAVENOUS at 22:15

## 2021-02-22 RX ADMIN — OXYCODONE HYDROCHLORIDE 10 MG: 10 TABLET ORAL at 19:46

## 2021-02-22 RX ADMIN — PIPERACILLIN AND TAZOBACTAM 4.5 G: 36; 4.5 INJECTION, POWDER, FOR SOLUTION INTRAVENOUS at 05:14

## 2021-02-22 RX ADMIN — METOPROLOL TARTRATE 25 MG: 25 TABLET, FILM COATED ORAL at 08:35

## 2021-02-22 RX ADMIN — MIDAZOLAM 2 MG: 1 INJECTION INTRAMUSCULAR; INTRAVENOUS at 15:13

## 2021-02-22 RX ADMIN — APIXABAN 5 MG: 5 TABLET, FILM COATED ORAL at 18:19

## 2021-02-22 RX ADMIN — PROPOFOL 30 MG: 10 INJECTION, EMULSION INTRAVENOUS at 15:14

## 2021-02-22 RX ADMIN — METOROPROLOL TARTRATE 5 MG: 5 INJECTION, SOLUTION INTRAVENOUS at 04:37

## 2021-02-22 RX ADMIN — PIPERACILLIN AND TAZOBACTAM 4.5 G: 36; 4.5 INJECTION, POWDER, FOR SOLUTION INTRAVENOUS at 15:19

## 2021-02-22 RX ADMIN — INSULIN GLARGINE 15 UNITS: 100 INJECTION, SOLUTION SUBCUTANEOUS at 21:38

## 2021-02-22 NOTE — PHYSICAL THERAPY NOTE
Physical Therapy Cancellation Note    The pt  Is off of the floor for TMA  Please re-order physical therapy post-op when appropriate  Thank you      Hallie Baugh, PTA

## 2021-02-22 NOTE — UTILIZATION REVIEW
Continued Stay Review    Date: 2/21                    Current Patient Class:  Inpatient Current Level of Care: Med Surg    HPI:70 y o  male initially admitted on 2/16 presents with recent right foot reconstructive surgery DOS: 2/12/2021 with fever and chills x2 days  Assessment/Plan: Sepsis Due to infected hardware/hematoma of R foot, now s/p removal and washout, POD#4, Atrial Fib  2/17 S/p INCISION AND DRAINAGE (I&D) EXTREMITY (Right)  REMOVAL EXTERNAL FIXATOR (Right)    Tentative for OR Monday 2/22  Continue IV antibiotics  Increase heart rate 1202-170s  Med adjusted; change beta blocker to 25 mg bid  Beta blocker 5 mg IV q6h prn  Continue tele monitoring  Per ID; Final antibiotic plan will depend on extent of amputation/ surgical cure and hardware removal planned once ischemia demarcates by Podiatry  Per Podiatry; Plan for right foot open TMA / wash-out / hardware removal tomorrow 2/22  NPO at 8am  Continue IVFs  Pertinent Labs/Diagnostic Results:   Results from last 7 days   Lab Units 02/16/21  1703   SARS-COV-2  Negative     Results from last 7 days   Lab Units 02/22/21  0551 02/21/21  1002 02/20/21  0546 02/19/21  0519 02/18/21  0521  02/16/21  1648   WBC Thousand/uL 9 89 9 16 9 89 9 06 8 26   < > 14 65*   HEMOGLOBIN g/dL 11 1* 11 8* 10 6* 10 0* 10 6*   < > 12 3   HEMATOCRIT % 34 9* 36 5 32 3* 31 4* 33 5*   < > 37 3   PLATELETS Thousands/uL 387 360 261 219 190   < > 186   NEUTROS ABS Thousands/µL  --   --   --   --   --   --  12 97*    < > = values in this interval not displayed           Results from last 7 days   Lab Units 02/22/21  0551 02/21/21  1002 02/20/21  0546 02/19/21  0519 02/18/21  0521   SODIUM mmol/L 142 142 141 140 142   POTASSIUM mmol/L 3 8 3 7 3 6 3 9 3 4*   CHLORIDE mmol/L 112* 110* 109* 109* 110*   CO2 mmol/L 24 27 25 24 26   ANION GAP mmol/L 6 5 7 7 6   BUN mg/dL 8 10 10 11 11   CREATININE mg/dL 0 82 0 93 0 73 0 74 0 87   EGFR ml/min/1 73sq m 91 84 95 95 89   CALCIUM mg/dL 8 8 8 9 8 4 8 5 8 6     Results from last 7 days   Lab Units 02/16/21  1648   AST U/L 52*   ALT U/L 61   ALK PHOS U/L 120*   TOTAL PROTEIN g/dL 7 1   ALBUMIN g/dL 2 9*   TOTAL BILIRUBIN mg/dL 0 77     Results from last 7 days   Lab Units 02/22/21  1125 02/22/21  0723 02/21/21  2112 02/21/21  1627 02/21/21  1214 02/21/21  0808 02/20/21  2111 02/20/21  1221 02/20/21  0804 02/19/21  2104 02/19/21  1802 02/19/21  1113   POC GLUCOSE mg/dl 183* 172* 207* 218* 225* 173* 187* 209* 183* 215* 262* 174*     Results from last 7 days   Lab Units 02/22/21  0551 02/21/21  1002 02/20/21  0546 02/19/21  0519 02/18/21  0521 02/17/21  0752 02/16/21  1648   GLUCOSE RANDOM mg/dL 166* 247* 172* 133 126 144* 175*       Results from last 7 days   Lab Units 02/16/21  1648   TROPONIN I ng/mL <0 02         Results from last 7 days   Lab Units 02/18/21  0521 02/17/21  0752 02/16/21  1648   PROTIME seconds 16 7* 17 8* 17 9*   INR  1 36* 1 47* 1 48*   PTT seconds  --   --  37         Results from last 7 days   Lab Units 02/20/21  1000 02/16/21  1648   PROCALCITONIN ng/ml 5 33* 1 08*     Results from last 7 days   Lab Units 02/16/21  1855 02/16/21  1648   LACTIC ACID mmol/L 1 2 2 1*     Results from last 7 days   Lab Units 02/20/21  1000   HEP B S AG  Non-reactive   HEP C AB  Non-reactive       Results from last 7 days   Lab Units 02/16/21  1743   CLARITY UA  Clear   COLOR UA  Yellow   SPEC GRAV UA  1 024   PH UA  6 0   GLUCOSE UA mg/dl >=1000 (1%)*   KETONES UA mg/dl Trace*   BLOOD UA  Trace*   PROTEIN UA mg/dl 100 (2+)*   NITRITE UA  Negative   BILIRUBIN UA  Negative   UROBILINOGEN UA E U /dl 1 0   LEUKOCYTES UA  Elevated glucose may cause decreased leukocyte values   See urine microscopic for [facility] result/*   WBC UA /hpf None Seen   RBC UA /hpf None Seen   BACTERIA UA /hpf None Seen   EPITHELIAL CELLS WET PREP /hpf None Seen   MUCUS THREADS  Occasional*     Results from last 7 days   Lab Units 02/16/21  1703   INFLUENZA A PCR  Negative   INFLUENZA B PCR  Negative   RSV PCR  Negative       Results from last 7 days   Lab Units 02/17/21  1636 02/17/21  1634 02/16/21  1648 02/16/21  1630   BLOOD CULTURE   --   --  No Growth After 5 Days  No Growth After 5 Days  GRAM STAIN RESULT  1+ Polys*  1+ Gram negative rods* 1+ Polys  No bacteria seen  --   --    WOUND CULTURE  3+ Growth of Enterobacter cloacae complex*  1+ Growth of  2+ Growth of Enterobacter cloacae complex*  1+ Growth of Enterococcus faecalis*  1+ Growth of   --   --        Vital Signs:   02/21/21 08:39:14  98 6 °F (37 °C)  132Abnormal   --  154/93  113  --  --  --   02/21/21 03:06:54  98 5 °F (36 9 °C)  --  18  152/86  108  --  --  --   02/20/21 22:25:57  99 4 °F (37 4 °C)  --  16  153/88  110  --  --  --   02/20/21 1945  --  --  --  --  --  --  None (Room air)         Medications:   Scheduled Medications:  apixaban, 5 mg, Oral, BID  atorvastatin, 40 mg, Oral, Daily  insulin glargine, 15 Units, Subcutaneous, HS  insulin lispro, 1-6 Units, Subcutaneous, 4x Daily (AC & HS)  lisinopril, 40 mg, Oral, Daily  metoprolol tartrate, 50 mg, Oral, Q12H SUNG  piperacillin-tazobactam, 4 5 g, Intravenous, Q6H  senna-docusate sodium, 1 tablet, Oral, HS      Continuous IV Infusions:  sodium chloride, 75 mL/hr, Intravenous, Continuous      PRN Meds:  acetaminophen, 650 mg, Oral, Q6H PRN  HYDROmorphone, 0 5 mg, Intravenous, Q4H PRN  metoprolol, 5 mg, Intravenous, Q6H PRN 2/21 x1  oxyCODONE, 10 mg, Oral, Q4H PRN  oxyCODONE, 5 mg, Oral, Q4H PRN  polyethylene glycol, 17 g, Oral, Daily PRN        Discharge Plan:     Network Utilization Review Department  ATTENTION: Please call with any questions or concerns to [phone] and carefully listen to the prompts so that you are directed to the right person   All voicemails are confidential   [facility] all requests for admission clinical reviews, approved or denied determinations and any other requests to dedicated fax number below belonging to the campus where the patient is receiving treatment   List of dedicated fax numbers for the Facilities:  [address] [place] [address] DENIALS (Administrative/Medical Necessity) [phone]   [address] (Maternity/NICU/Pediatrics) [phone] [address] [facility] [address] Row[address] Rene [address] (  Koby Martin "[address]" 103) [address] [place] [address] [address] [place]) [address] [place] [address] [phone]

## 2021-02-22 NOTE — ANESTHESIA PREPROCEDURE EVALUATION
Procedure:  AMPUTATION TRANSMETATARSAL (TMA) (Right Foot)    Relevant Problems   CARDIO   (+) Atrial fibrillation with RVR (HCC)   (+) Essential hypertension   (+) Hyperlipidemia      ENDO   (+) Type 2 diabetes mellitus with diabetic neuropathy (HCC)      /RENAL   (+) Benign prostatic hyperplasia with lower urinary tract symptoms      NEURO/PSYCH   (+) Type 2 diabetes mellitus with diabetic neuropathy (HCC)      CAD/PCI/MI/CHF -- denies  COPD/ASTHMA/RANDI -- denies  PROBS WITH PRIOR ANESTHESIA -- denies  NPO STATUS CONFIRMED      Lab Results   Component Value Date    WBC 9 89 02/22/2021    HGB 11 1 (L) 02/22/2021     02/22/2021     Lab Results   Component Value Date    SODIUM 142 02/22/2021    K 3 8 02/22/2021    BUN 8 02/22/2021    CREATININE 0 82 02/22/2021    EGFR 91 02/22/2021    GLUCOSE 110 (H) 05/22/2018     Lab Results   Component Value Date    PTT 37 02/16/2021      Lab Results   Component Value Date    INR 1 36 (H) 02/18/2021           Lab Results   Component Value Date    HGBA1C 7 8 (H) 12/23/2020           Physical Exam    Airway    Mallampati score: II  TM Distance: >3 FB       Dental   No notable dental hx     Cardiovascular      Pulmonary      Other Findings        Anesthesia Plan  ASA Score- 3     Anesthesia Type- IV sedation with anesthesia with ASA Monitors  Additional Monitors:   Airway Plan:     Comment: I have seen the patient and reviewed the history  Patient to receive IV sedation with full ASA monitors  Risks discussed with the patient, consent signed          Plan Factors-Exercise tolerance (METS): >4 METS  Chart reviewed  EKG reviewed  Existing labs reviewed  Patient summary reviewed  Induction-     Postoperative Plan- Plan for postoperative opioid use  Informed Consent- Anesthetic plan and risks discussed with patient  I personally reviewed this patient with the CRNA  Discussed and agreed on the Anesthesia Plan with the CRNA  Candice Hopper

## 2021-02-22 NOTE — PLAN OF CARE
Problem: OCCUPATIONAL THERAPY ADULT  Goal: Performs self-care activities at highest level of function for planned discharge setting  See evaluation for individualized goals  Description: Treatment Interventions: ADL retraining, Functional transfer training, UE strengthening/ROM, Patient/family training, Endurance training, Equipment evaluation/education, Compensatory technique education, Continued evaluation, Energy conservation, Activityengagement          See flowsheet documentation for full assessment, interventions and recommendations  Outcome: Progressing  Note: Limitation: Decreased ADL status, Decreased endurance, Decreased self-care trans, Decreased high-level ADLs  Prognosis: Good  Assessment: pt participated in am ot session and was seen focusing on toileting using commode, ub dressing and spt using rw commode to bed  with minimal asst x 1  pt was able to lift  r le onto and off eob while sitting on commode for elevation  pt states he is for or later today  will follow focusing on goals from ie during conversation pt states that his wife performs homemaking and meal prep  pt with noted marked improvement in spt's /  balance and sit to from stand during toileting         OT Discharge Recommendation: Return to previous environment with social support  OT - OK to Discharge: No     April A CRISTINA Lee

## 2021-02-22 NOTE — ANESTHESIA POSTPROCEDURE EVALUATION
Post-Op Assessment Note    CV Status:  Stable  Pain Score: 0    Pain management: adequate     Mental Status:  Alert and awake   Hydration Status:  Euvolemic   PONV Controlled:  Controlled   Airway Patency:  Patent      Post Op Vitals Reviewed: Yes      Staff: Anesthesiologist, CRNA         No complications documented      /52 (02/22/21 1703)    Temp 98 2 °F (36 8 °C) (02/22/21 1703)    Pulse 80 (02/22/21 1703)   Resp 20 (02/22/21 1703)    SpO2 100 % (02/22/21 1703)

## 2021-02-22 NOTE — PROGRESS NOTES
[facility] Podiatry - Progress Note  Patient: Shara Ward  78 y o  male   MRN: [id number]  PCP: Melonie Weber MD  Unit/Bed#: [facility] 601-83 Encounter: [id number]  Date Of Visit: 21    ASSESSMENT:    Shara Ward  is a 78 y o  male with:    1  Right foot hematoma, surgical site infection, ischemia  - S/p exfix removal, I&D, fasciotomy (DOS 21)  2  Congenital talipes equinovarus right foot             - s/p midfoot arthrodesis, exfix application (DOS 3/92/15)  3  T2Dm, last A1C 7 8 %  4  HTN  5  Altered mental status  6  Constipation  7  atrial fibrillation  8  Sepsis on admission - resolved         PLAN:    · Planning for open TMA, with removal of hardware to be performed today with Dr Nishant Campo   · NPO since midnight with IV fluid hydration  · Consent in chart to be signed by surgeon prior to surgery   · Appreciate ID recommendations and zosyn   · Continuing elevating foot for DTI ppx  · Continue eliquis for afib and DVT ppx         SUBJECTIVE:     The patient was seen, evaluated, and assessed at bedside today  The patient was awake, alert, and in no acute distress  No acute events overnight  The patient reports understanding of the procedure and need for surgery  Patient denies N/V/F/chills/SOB/CP  Light headed with movement      OBJECTIVE:     Vitals:   /90   Pulse (!) 117   Temp 98 1 °F (36 7 °C)   Resp 18   Ht 6' 4" (1 93 m)   Wt 109 kg (240 lb 15 4 oz)   SpO2 98%   BMI 29 33 kg/m²     Temp (24hrs), Av 5 °F (36 9 °C), Min:98 1 °F (36 7 °C), Max:99 2 °F (37 3 °C)      Physical Exam :     General:  Alert, cooperative, and in no distress  Lower extremity exam:  Cardiovascular status at baseline  Neurological status at baseline  Musculoskeletal status at baseline  No calf tenderness noted bilaterally  Dressing left clean, dry and intact with dry stable eschar of right hallux    No strikethrough of dressing        Additional Data:     Labs:    Results from last 7 days   Lab Units 02/22/21  0551  02/16/21  1648   WBC Thousand/uL 9 89   < > 14 65*   HEMOGLOBIN g/dL 11 1*   < > 12 3   HEMATOCRIT % 34 9*   < > 37 3   PLATELETS Thousands/uL 387   < > 186   NEUTROS PCT %  --   --  88*   LYMPHS PCT %  --   --  5*   MONOS PCT %  --   --  6   EOS PCT %  --   --  0    < > = values in this interval not displayed  Results from last 7 days   Lab Units 02/22/21  0551  02/16/21  1648   POTASSIUM mmol/L 3 8   < > 3 7   CHLORIDE mmol/L 112*   < > 106   CO2 mmol/L 24   < > 22   BUN mg/dL 8   < > 12   CREATININE mg/dL 0 82   < > 1 05   CALCIUM mg/dL 8 8   < > 8 8   ALK PHOS U/L  --   --  120*   ALT U/L  --   --  61   AST U/L  --   --  52*    < > = values in this interval not displayed  Results from last 7 days   Lab Units 02/18/21  0521   INR  1 36*       * I Have Reviewed All Lab Data Listed Above  Recent Cultures (last 7 days):     Results from last 7 days   Lab Units 02/17/21  1636 02/17/21  1634 02/16/21  1648 02/16/21  1630   BLOOD CULTURE   --   --  No Growth After 5 Days  No Growth After 5 Days  GRAM STAIN RESULT  1+ Polys*  1+ Gram negative rods* 1+ Polys  No bacteria seen  --   --    WOUND CULTURE  3+ Growth of Enterobacter cloacae complex*  1+ Growth of  2+ Growth of Enterobacter cloacae complex*  1+ Growth of Enterococcus faecalis*  1+ Growth of   --   --      Results from last 7 days   Lab Units 02/17/21  1636 02/17/21  1634   ANAEROBIC CULTURE  No anaerobes isolated No anaerobes isolated       Imaging: I have personally reviewed pertinent films in PACS  Pathology, and Other Studies: I have personally reviewed pertinent reports  ** Please Note: Portions of the record may have been created with voice recognition software  Occasional wrong word or "sound a like" substitutions may have occurred due to the inherent limitations of voice recognition software  Read the chart carefully and recognize, using context, where substitutions have occurred   **

## 2021-02-22 NOTE — PROGRESS NOTES
INTERNAL MEDICINE RESIDENCY Consult PROGRESS NOTE     Name: Lauren Gonzalez  Age & Sex: 78 y o  male   MRN: [id number]  Unit/Bed#: [facility] 624-01   Encounter: [id number]  Team: SOD Team B     PATIENT INFORMATION     Name: Lauren Gonzalez  Age & Sex: 78 y o  male   MRN: [id number]  Hospital Stay Days: 6    ASSESSMENT/PLAN     Principal Problem:    Sepsis [facility])  Active Problems:    Hyperlipidemia    Type 2 diabetes mellitus with diabetic neuropathy (HCC)    Atrial fibrillation with RVR ([facility])    Essential hypertension    Congenital talipes equinovarus, right foot    Post-operative state    Cellulitis of extremity    Abscess      Essential hypertension  Assessment & Plan  - patient has history of longstanding hypertension on lisinopril 40mg daily and Cardizem 120mg BID  - continue lisinopril  - cardizem to change to metoprolol as detailed elsewhere  - trend BMP     Atrial fibrillation with RVR ([facility] )  Assessment & Plan  Patient has longstanding history of atrial fibrillation on Cardizem 120 BID for rate control and Eliquis for anticoagulation  Does not follow with cardiology outpatient  - Continue eliquis   - no indication to trend INR unless useful to surgical team  -currently rate controlled  -increase metoprolol to 50 mg q 12  - metoprolol 5mg IV q6 PRN for HR >120  -  monitor on telemetry    Type 2 diabetes mellitus with diabetic neuropathy ([facility] )  Assessment & Plan  On sitagliptin, metformin, and glimepiride outpatient: all held on admission  Not previously on insulin  SSI ordered, some hyperglycemia noted this morning  Fasting blood sugar 180s to 200s  - increase Lantus to 15 units q h s    Goal blood glucose 140-180  Continue to monitor, likely elevated in the setting of acute illness    Hyperlipidemia  Assessment & Plan  Stable  Continue atorvastatin 40mg    * Sepsis ([facility] )  Assessment & Plan  Due to infected hardware/hematoma of R foot, now status post removal and washout, POD#4  - per podiatry: monitor for demarcation prior to amputation, scheduled for surgery today  - blood cultures negative x 72 hours  - ID consulted, appreciated  - Zosyn as per Infectious Disease    Drug-induced constipation-resolved as of 2021  Assessment & Plan  Resolved  - Encourage PO intake  - Continue senna, miralax        Disposition:  Podiatry primary, undergoing surgery     SUBJECTIVE     Patient seen and examined  No acute events overnight  Denies any chest pain, shortness of breath overnight  OBJECTIVE     Vitals:    21 0009 21 0317 21 0724 21 0725   BP: 137/77 163/93 (!) 153/102 150/90   Pulse: 89 94 (!) 117    Resp:   18    Temp:  98 6 °F (37 °C) 98 1 °F (36 7 °C)    TempSrc:  Oral     SpO2: 96% 97% 98%    Weight:       Height:          Temperature:   Temp (24hrs), Av 5 °F (36 9 °C), Min:98 1 °F (36 7 °C), Max:99 2 °F (37 3 °C)    Temperature: 98 1 °F (36 7 °C)  Intake & Output:  I/O        07 -  0700  07 -  0700  07 -  0700    P  O   480 0    I V  (mL/kg) 1718 8 (15 8) 1648 8 (15 1)     IV Piggyback 200 300     Total Intake(mL/kg) 1918 8 (17 6) 2428 8 (22 3) 0 (0)    Urine (mL/kg/hr) 2125 (0 8) 1375 (0 5) 1025 (2 3)    Stool 0 0 0    Total Output 2125 1375 1025    Net -206 3 +1053 8 -1025           Unmeasured Urine Occurrence 1 x  2 x    Unmeasured Stool Occurrence 1 x 1 x 1 x        Weights:   IBW: 86 8 kg    Body mass index is 29 33 kg/m²  Weight (last 2 days)     None        Physical Exam  Constitutional:       General: He is not in acute distress  Appearance: Normal appearance  He is not ill-appearing  HENT:      Head: Normocephalic and atraumatic  Mouth/Throat:      Mouth: Mucous membranes are moist    Eyes:      Extraocular Movements: Extraocular movements intact  Conjunctiva/sclera: Conjunctivae normal       Pupils: Pupils are equal, round, and reactive to light  Cardiovascular:      Rate and Rhythm: Normal rate and regular rhythm  Pulses: Normal pulses  Heart sounds: Normal heart sounds  No murmur  No friction rub  No gallop  Pulmonary:      Effort: Pulmonary effort is normal  No respiratory distress  Breath sounds: Normal breath sounds  No wheezing or rales  Abdominal:      General: Abdomen is flat  Bowel sounds are normal  There is no distension  Palpations: Abdomen is soft  Tenderness: There is no abdominal tenderness  There is no guarding  Musculoskeletal:         General: Swelling and deformity present  Right lower leg: Edema present  Left lower leg: Edema present  Comments: Trace edema bilaterally  Right foot wrapped   Skin:     General: Skin is warm and dry  Comments: Dark discoloration of his large toe on the right foot  Neurological:      General: No focal deficit present  Mental Status: He is alert and oriented to person, place, and time  Psychiatric:         Mood and Affect: Mood normal          Behavior: Behavior normal        LABORATORY DATA     Labs: I have personally reviewed pertinent reports  Results from last 7 days   Lab Units 02/22/21 0551 02/21/21  1002 02/20/21  0546  02/16/21  1648   WBC Thousand/uL 9 89 9 16 9 89   < > 14 65*   HEMOGLOBIN g/dL 11 1* 11 8* 10 6*   < > 12 3   HEMATOCRIT % 34 9* 36 5 32 3*   < > 37 3   PLATELETS Thousands/uL 387 360 261   < > 186   NEUTROS PCT %  --   --   --   --  88*   MONOS PCT %  --   --   --   --  6    < > = values in this interval not displayed  Results from last 7 days   Lab Units 02/22/21  0551 02/21/21  1002 02/20/21  0546  02/16/21  1648   POTASSIUM mmol/L 3 8 3 7 3 6   < > 3 7   CHLORIDE mmol/L 112* 110* 109*   < > 106   CO2 mmol/L 24 27 25   < > 22   BUN mg/dL 8 10 10   < > 12   CREATININE mg/dL 0 82 0 93 0 73   < > 1 05   CALCIUM mg/dL 8 8 8 9 8 4   < > 8 8   ALK PHOS U/L  --   --   --   --  120*   ALT U/L  --   --   --   --  61   AST U/L  --   --   --   --  52*    < > = values in this interval not displayed  Results from last 7 days   Lab Units 02/18/21  0521 02/17/21  0752 02/16/21  1648   INR  1 36* 1 47* 1 48*   PTT seconds  --   --  37     Results from last 7 days   Lab Units 02/16/21  1855   LACTIC ACID mmol/L 1 2     Results from last 7 days   Lab Units 02/16/21  1648   TROPONIN I ng/mL <0 02       IMAGING & DIAGNOSTIC TESTING     Radiology Results: I have personally reviewed pertinent reports  Xr Foot 2 Vw Right    Result Date: 2/17/2021  Impression: Findings suspicious for infection involving the 4th and 5th tarsometatarsal joints, less conspicuous previously  Status post surgery Workstation performed: [id number]     Xr Foot Right 3+ Views    Result Date: 2/18/2021  Impression: Removal of the external fixation device  Otherwise stable alignment of the post surgical changes  Workstation performed: [id number]     Other Diagnostic Testing: I have personally reviewed pertinent reports      ACTIVE MEDICATIONS     Current Facility-Administered Medications   Medication Dose Route Frequency    acetaminophen (TYLENOL) tablet 650 mg  650 mg Oral Q6H PRN    apixaban (ELIQUIS) tablet 5 mg  5 mg Oral BID    atorvastatin (LIPITOR) tablet 40 mg  40 mg Oral Daily    HYDROmorphone (DILAUDID) injection 0 5 mg  0 5 mg Intravenous Q4H PRN    insulin glargine (LANTUS) subcutaneous injection 15 Units 0 15 mL  15 Units Subcutaneous HS    insulin lispro (HumaLOG) 100 units/mL subcutaneous injection 1-6 Units  1-6 Units Subcutaneous 4x Daily (AC & HS)    lisinopril (ZESTRIL) tablet 40 mg  40 mg Oral Daily    metoprolol (LOPRESSOR) injection 5 mg  5 mg Intravenous Q6H PRN    metoprolol tartrate (LOPRESSOR) tablet 50 mg  50 mg Oral Q12H [facility]    oxyCODONE (ROXICODONE) immediate release tablet 10 mg  10 mg Oral Q4H PRN    oxyCODONE (ROXICODONE) IR tablet 5 mg  5 mg Oral Q4H PRN    piperacillin-tazobactam (ZOSYN) 4 5 g in sodium chloride 0 9 % 100 mL IVPB  4 5 g Intravenous Q6H    polyethylene glycol (MIRALAX) packet 17 g  17 g Oral Daily PRN    senna-docusate sodium (SENOKOT S) 8 6-50 mg per tablet 1 tablet  1 tablet Oral HS    sodium chloride 0 9 % infusion  75 mL/hr Intravenous Continuous       VTE Pharmacologic Prophylaxis: Reason for no pharmacologic prophylaxis Eliquis  VTE Mechanical Prophylaxis: sequential compression device    Portions of the record may have been created with voice recognition software  Occasional wrong word or "sound a like" substitutions may have occurred due to the inherent limitations of voice recognition software    Read the chart carefully and recognize, using context, where substitutions have occurred   ==  Amaya Leahy, [address]  Internal Medicine Residency PGY-2

## 2021-02-22 NOTE — OP NOTE
OPERATIVE REPORT - Podiatry  PATIENT NAME: Isidoro Unger  :  1950  MRN: [id number]  Pt Location: BE OR ROOM 06    SURGERY DATE: 2021    Surgeon(s) and Role:     * Britney Pacheco DPM - Primary     * Anshu Mccann DPM - Assisting     * Maribel Murray DPM - Assisting    Pre-op Diagnosis:  Sepsis without acute organ dysfunction, due to unspecified organism [facility]) [A41 9]  Deep disruption or dehiscence of operation wound, subsequent encounter [T81 32XD]    Post-Op Diagnosis Codes:     * Sepsis without acute organ dysfunction, due to unspecified organism ([facility] ) [A41 9]     * Deep disruption or dehiscence of operation wound, subsequent encounter [T81 32XD]    Procedure(s) (LRB):  AMPUTATION TRANSMETATARSAL (TMA), REMOVAL NAIL IM T2 ICF HARDWARE (Right)    Specimen(s):  ID Type Source Tests Collected by Time Destination   1 : Right TMA Tissue Foot, Right TISSUE EXAM Britney Pacheco DPM 2021 1529        Estimated Blood Loss:   Minimal    Drains:  * No LDAs found *    Anesthesia Type:   Choice with 20 ml of 1% Lidocaine and 0 25% Bupivacaine in a 1:1 mixture    Hemostasis:  Anatomic dissection        Materials:  Implant Name Type Inv  Item Serial No   Lot No  LRB No  Used Action   END CAP STANDARD - [id number]  END CAP STANDARD  [employer] [id number] Right 1 Explanted   NAIL IM T2 ICF 8 X 120MM - [id number]  NAIL IM T2 ICF 8 X 120MM  [employer] [id number] Right 1 Explanted   SCREW 3 5 X 18MM T10 FLLY TRHD - [id number]  SCREW 3 5 X 18MM T10 FLLY TRHD  [employer]  Right 1 Explanted   SCREW 3 5 X 20MM T10 FLLY TRHD - [id number]  SCREW 3 5 X 20MM T10 FLLY TRHD  [employer]  Right 1 Explanted   SCREW 3 5 X 44MM T10 FLLY TRHD - [id number]  SCREW 3 5 X 44MM T10 FLLY TRHD  [employer]  Right 1 Explanted     2-0 Vicryl Plus, 2-0 nylon, 4-0 nylon    Operative Findings:  Intraoperatively there was no purulent drainage expressed or any sort of infection    There was dead deep tissue on the medial plantar 1st metatarsal secondary to hematoma  Complications:   None    Procedure and Technique:     Under mild sedation, the patient was brought into the operating room and placed on the operating room table in the supine position  IV sedation was achieved by anesthesia team and a universal timeout was performed where all parties are in agreement of correct patient, correct procedure and correct site  A ankle block was performed consisting of 20 ml of 1% Lidocaine and 0 25% Bupivacaine in a 1:1 mixture  The foot was then prepped and draped in the usual aseptic manner  Attention was then directed to the right foot where an open wound was located  Using 15  Blade a full-thickness incision was made circumferential to the wound saving as much plantar flap as possible  Once at the level of metatarsals a sagittal saw was utilized to make metatarsal cuts in a dorsal distal to plantar proximal fashion  With careful dissection forefoot was removed off of the operative site and sent for routine examination  At this point hardware in the 1st metatarsal head was exposed and attempt was made to cover the exposed hardware with the plantar flap however was unsuccessful due to distal metatarsal cuts  Decision was made to remove the hardware which was successfully removed utilizing Ubimo screw   C-arm fluoroscopy was utilized to locate the screws  Next utilizing a sagittal saw more proximal metatarsal cuts were made in removed from the operative site  Remaining 5th metatarsal long  was also removed  Any necrotic and nonviable soft tissue including tendon and muscle was removed from the wound  Adequate bleeding was noted in the plantar flap  There was no infection in the foot  Wound was then copiously irrigated with normal saline/pulse lavaged and Irrisept    The plantar flap was then reapproximated to the dorsal at the level of 2-3 metatarsals utilizing 2-0 Vicryl Plus and 2-0 nylon in horizontal mattress fashion  There was good capillary refill noted  after the reapproximation plantar flap  The wound was left open on the medial and the lateral aspect of the right foot due to unable to cover with plantar flap  The incision site was then dressed with Xeroform, 4 x 4 gauze, ABD Kerlix and Coban  The patient tolerated the procedure and anesthesia well without immediate complications and transferred to PACU with vital signs stable  As with many limb salvage procedures, we contemplate the possibility of performing further stages to this procedure  Procedures may include debridements, delayed closure, plastic surgery techniques, or more proximal amputations  This procedure may be considered part of a multi-staged limb salvage treatment plan  Dr Georgi Alexis was present during the entire procedure and participated in all hall aspects  Gordon Vaughan DPM  DATE: February 22, 2021  TIME: 5:03 PM      Portions of the record may have been created with voice recognition software  Occasional wrong word or "sound a like" substitutions may have occurred due to the inherent limitations of voice recognition software  Read the chart carefully and recognize, using context, where substitutions have occurred

## 2021-02-22 NOTE — PROGRESS NOTES
Progress Note - Infectious Disease   Bean Sandoval  78 y o  male MRN: [id number]  Unit/Bed#: [facility] 624-01 Encounter: [id number]      Impression/Plan:  63-year-old male patient history of Type II diabetes, hypertension, hyperlipidemia, AFib, clubfoot status post reconstructive surgery on 02/12/2021, external fixator placement placement with complicated postop bleeding required extra sutures prior to discharge on 02/13/2021 from [facility]      Patient  presented to the hospital fever, chills, palpitation for 48-H prior to admission on 02/17/2021  Patient had fever of 103, tachycardia, tachypnea, leukocytosis, elevated lactic acid with x-ray of the foot shows 4 in 5 tarsometatarsal joint infection, significant soft tissue swelling  Patient is status post external fixator removal, evacuation of large hematoma, fasciotomy for compartment syndrome on 02/17/2021  Patient postoperative day 5  Ischemic changes on Rt Big toe  Plan for TMA/ washout Today  B Cx negative- Wound Cx Enterobacter Cloaca & E fecalis      1  Right foot infected hematoma-cellulitis-septic arthritis  2  Compartment syndrome status post fasciotomy-concern for ischemic changes on the Rt big toe  3  Sepsis due to 1   4  AFib with controlled heart rate  5  Diabetes type 2  6  Hyperlipidemia  4  Positive HIV screen - Pending RNA      Plan:      · Wound Cx growing Enterobacter Cloaca, Enterococcus  · Antibiotics deescalated to Zosyn Per Culture Results  - Day 6 Of Antibiotics Total   · Remains Afebrile -leukocytosis improved  Lactic acidosis resolved  · May Dc antibiotics after surgical cure/ Amputation  · Negative blood cultures 48 hours  · Monitor temperature, white blood cell count care    · Follow HIV RNA Quantitative test    · Rest of management per Podiatry, internal medicine team     Antibiotics:  Zosyn day 3  Vanco, Cefepime, Flagyl 17-20    Subjective:  Patient has no fever, chills, sweats; no nausea, vomiting, diarrhea; no cough, shortness of breath;  intermittent pain at right foot   No new symptoms  Objective:  Vitals:  Temp:  [98 1 °F (36 7 °C)-99 2 °F (37 3 °C)] 98 1 °F (36 7 °C)  HR:  [] 117  Resp:  [17-18] 18  BP: ()/() 150/90  SpO2:  [96 %-98 %] 98 %  Temp (24hrs), Av 5 °F (36 9 °C), Min:98 1 °F (36 7 °C), Max:99 2 °F (37 3 °C)  Current: Temperature: 98 1 °F (36 7 °C)    Physical Exam:   General Appearance:  Alert, interactive, nontoxic, no acute distress  Throat: Oropharynx moist without lesions  Lungs:   Clear to auscultation bilaterally; no wheezes, rhonchi or rales; respirations unlabored   Heart:  RRR; no murmur, rub or gallop   Abdomen:   Soft, non-tender, non-distended, positive bowel sounds  Extremities: Right foot welling, Dusky Rt Big toe color  Dresssing in place    Skin: No new rashes or lesions  No draining wounds noted  Labs, Imaging, & Other studies:   All pertinent labs and imaging studies were personally reviewed  Results from last 7 days   Lab Units 21  0551 21  1002 21  0546   WBC Thousand/uL 9 89 9 16 9 89   HEMOGLOBIN g/dL 11 1* 11 8* 10 6*   PLATELETS Thousands/uL 387 360 261     Results from last 7 days   Lab Units 21  0551 21  1002 21  0546  21  1648   SODIUM mmol/L 142 142 141   < > 137   POTASSIUM mmol/L 3 8 3 7 3 6   < > 3 7   CHLORIDE mmol/L 112* 110* 109*   < > 106   CO2 mmol/L 24 27 25   < > 22   BUN mg/dL 8 10 10   < > 12   CREATININE mg/dL 0 82 0 93 0 73   < > 1 05   EGFR ml/min/1 73sq m 91 84 95   < > 73   CALCIUM mg/dL 8 8 8 9 8 4   < > 8 8   AST U/L  --   --   --   --  52*   ALT U/L  --   --   --   --  61   ALK PHOS U/L  --   --   --   --  120*    < > = values in this interval not displayed  Results from last 7 days   Lab Units 21  1328 21  1636 21  1634 21  1648 21  1630   BLOOD CULTURE   --   --   --  No Growth After 5 Days  No Growth After 5 Days     GRAM STAIN RESULT   --  1+ Polys*  1+ Gram negative rods* 1+ Polys  No bacteria seen  --   --    WOUND CULTURE   --  3+ Growth of Enterobacter cloacae complex*  1+ Growth of  2+ Growth of Enterobacter cloacae complex*  1+ Growth of Enterococcus faecalis*  1+ Growth of   --   --    MRSA CULTURE ONLY  No Methicillin Resistant Staphlyococcus aureus (MRSA) isolated  --   --   --   --      Results from last 7 days   Lab Units 02/20/21  1000 02/16/21  1648   PROCALCITONIN ng/ml 5 33* 1 08*

## 2021-02-22 NOTE — OCCUPATIONAL THERAPY NOTE
Occupational Therapy Treatment Note:       02/22/21 1000   OT Last Visit   OT Visit Date 02/22/21   Note Type   Note Type Treatment   Restrictions/Precautions   RLE Weight Bearing Per Order NWB   Other Precautions WBS; Fall Risk   Pain Assessment   Pain Assessment Tool 0-10   Pain Score No Pain   ADL   Where Assessed Commode   UB Dressing Assistance 5  Supervision/Setup   Toileting Assistance  2  Maximal Assistance   Functional Standing Tolerance   Time f - balance during 30 second stand for toileting and transfers  Bed Mobility   Sit to Supine 5  Supervision   Transfers   Sit to Stand 4  Minimal assistance   Additional items Assist x 1   Stand to Sit 4  Minimal assistance   Additional items Assist x 1   Stand pivot 4  Minimal assistance   Additional items Assist x 1  (good ability to maintain nwbing)   Cognition   Overall Cognitive Status [facility]   Arousal/Participation Alert   Attention Within functional limits   Memory Within functional limits   Activity Tolerance   Activity Tolerance Patient tolerated treatment well   Assessment   Assessment pt participated in am ot session and was seen focusing on toileting using commode, ub dressing and spt using rw commode to bed  with minimal asst x 1  pt was able to lift  r le onto and off eob while sitting on commode for elevation  pt states he is for or later today  will follow focusing on goals from ie during conversation pt states that his wife performs homemaking and meal prep  pt with noted marked improvement in spt's /  balance and sit to from stand during toileting  Plan   Treatment Interventions ADL retraining;Functional transfer training; Endurance training;Patient/family training;Equipment evaluation/education; Activityengagement   Goal Expiration Date 03/06/21   OT Treatment Day 1   OT Frequency 3-5x/wk   Recommendation   OT Discharge Recommendation Return to previous environment with social support   Ladonna Couch

## 2021-02-22 NOTE — PLAN OF CARE
Problem: Potential for Falls  Goal: Patient will remain free of falls  Description: INTERVENTIONS:  - Assess patient frequently for physical needs  -  Identify cognitive and physical deficits and behaviors that affect risk of falls    -  [place] fall precautions as indicated by assessment   - Educate patient/family on patient safety including physical limitations  - Instruct patient to call for assistance with activity based on assessment  - Modify environment to reduce risk of injury  - Consider OT/PT consult to assist with strengthening/mobility  Outcome: Progressing     Problem: Prexisting or High Potential for Compromised Skin Integrity  Goal: Skin integrity is maintained or improved  Description: INTERVENTIONS:  - Identify patients at risk for skin breakdown  - Assess and monitor skin integrity  - Assess and monitor nutrition and hydration status  - Monitor labs   - Assess for incontinence   - Turn and reposition patient  - Assist with mobility/ambulation  - Relieve pressure over bony prominences  - Avoid friction and shearing  - Provide appropriate hygiene as needed including keeping skin clean and dry  - Evaluate need for skin moisturizer/barrier cream  - Collaborate with interdisciplinary team   - Patient/family teaching  - Consider wound care consult   Outcome: Progressing     Problem: PAIN - ADULT  Goal: Verbalizes/displays adequate comfort level or baseline comfort level  Description: Interventions:  - Encourage patient to monitor pain and request assistance  - Assess pain using appropriate pain scale  - Administer analgesics based on type and severity of pain and evaluate response  - Implement non-pharmacological measures as appropriate and evaluate response  - Consider cultural and social influences on pain and pain management  - Notify physician/advanced practitioner if interventions unsuccessful or patient reports new pain  Outcome: Progressing     Problem: INFECTION - ADULT  Goal: Absence or prevention of progression during hospitalization  Description: INTERVENTIONS:  - Assess and monitor for signs and symptoms of infection  - Monitor lab/diagnostic results  - Monitor all insertion sites, i e  indwelling lines, tubes, and drains  - Monitor endotracheal if appropriate and nasal secretions for changes in amount and color  - [place] appropriate cooling/warming therapies per order  - Administer medications as ordered  - Instruct and encourage patient and family to use good hand hygiene technique  - Identify and instruct in appropriate isolation precautions for identified infection/condition  Outcome: Progressing     Problem: SAFETY ADULT  Goal: Patient will remain free of falls  Description: INTERVENTIONS:  - Assess patient frequently for physical needs  -  Identify cognitive and physical deficits and behaviors that affect risk of falls    -  [place] fall precautions as indicated by assessment   - Educate patient/family on patient safety including physical limitations  - Instruct patient to call for assistance with activity based on assessment  - Modify environment to reduce risk of injury  - Consider OT/PT consult to assist with strengthening/mobility  Outcome: Progressing  Goal: Maintain or return to baseline ADL function  Description: INTERVENTIONS:  -  Assess patient's ability to carry out ADLs; assess patient's baseline for ADL function and identify physical deficits which impact ability to perform ADLs (bathing, care of mouth/teeth, toileting, grooming, dressing, etc )  - Assess/evaluate cause of self-care deficits   - Assess range of motion  - Assess patient's mobility; develop plan if impaired  - Assess patient's need for assistive devices and provide as appropriate  - Encourage maximum independence but intervene and supervise when necessary  - Involve family in performance of ADLs  - Assess for home care needs following discharge   - Consider OT consult to assist with ADL evaluation and planning for discharge  - Provide patient education as appropriate  Outcome: Progressing  Goal: Maintain or return mobility status to optimal level  Description: INTERVENTIONS:  - Assess patient's baseline mobility status (ambulation, transfers, stairs, etc )    - Identify cognitive and physical deficits and behaviors that affect mobility  - Identify mobility aids required to assist with transfers and/or ambulation (gait belt, sit-to-stand, lift, walker, cane, etc )  - [place] fall precautions as indicated by assessment  - Record patient progress and toleration of activity level on Mobility SBAR; progress patient to next Phase/Stage  - Instruct patient to call for assistance with activity based on assessment  - Consider rehabilitation consult to assist with strengthening/weightbearing, etc   Outcome: Progressing     Problem: DISCHARGE PLANNING  Goal: Discharge to home or other facility with appropriate resources  Description: INTERVENTIONS:  - Identify barriers to discharge w/patient and caregiver  - Arrange for needed discharge resources and transportation as appropriate  - Identify discharge learning needs (meds, wound care, etc )  - Arrange for interpretive services to assist at discharge as needed  - Refer to Case Management Department for coordinating discharge planning if the patient needs post-hospital services based on physician/advanced practitioner order or complex needs related to functional status, cognitive ability, or social support system  Outcome: Progressing

## 2021-02-23 LAB
ANION GAP SERPL CALCULATED.3IONS-SCNC: 5 MMOL/L (ref 4–13)
ATRIAL RATE: 187 BPM
BACTERIA WND AEROBE CULT: ABNORMAL
BUN SERPL-MCNC: 8 MG/DL (ref 5–25)
CALCIUM SERPL-MCNC: 8.8 MG/DL (ref 8.3–10.1)
CHLORIDE SERPL-SCNC: 111 MMOL/L (ref 100–108)
CO2 SERPL-SCNC: 28 MMOL/L (ref 21–32)
CREAT SERPL-MCNC: 0.91 MG/DL (ref 0.6–1.3)
ERYTHROCYTE [DISTWIDTH] IN BLOOD BY AUTOMATED COUNT: 14.6 % (ref 11.6–15.1)
GFR SERPL CREATININE-BSD FRML MDRD: 86 ML/MIN/1.73SQ M
GLUCOSE SERPL-MCNC: 153 MG/DL (ref 65–140)
GLUCOSE SERPL-MCNC: 179 MG/DL (ref 65–140)
GLUCOSE SERPL-MCNC: 186 MG/DL (ref 65–140)
GLUCOSE SERPL-MCNC: 209 MG/DL (ref 65–140)
GLUCOSE SERPL-MCNC: 295 MG/DL (ref 65–140)
GRAM STN SPEC: ABNORMAL
GRAM STN SPEC: ABNORMAL
HCT VFR BLD AUTO: 35.8 % (ref 36.5–49.3)
HGB BLD-MCNC: 11.6 G/DL (ref 12–17)
HIV1 RNA # SERPL NAA+PROBE: <20 COPIES/ML
HIV1 RNA SERPL NAA+PROBE-LOG#: NORMAL LOG10COPY/ML
MCH RBC QN AUTO: 30.6 PG (ref 26.8–34.3)
MCHC RBC AUTO-ENTMCNC: 32.4 G/DL (ref 31.4–37.4)
MCV RBC AUTO: 95 FL (ref 82–98)
PLATELET # BLD AUTO: 456 THOUSANDS/UL (ref 149–390)
PMV BLD AUTO: 9.9 FL (ref 8.9–12.7)
POTASSIUM SERPL-SCNC: 3.9 MMOL/L (ref 3.5–5.3)
QRS AXIS: 53 DEGREES
QRSD INTERVAL: 98 MS
QT INTERVAL: 302 MS
QTC INTERVAL: 491 MS
RBC # BLD AUTO: 3.79 MILLION/UL (ref 3.88–5.62)
SODIUM SERPL-SCNC: 144 MMOL/L (ref 136–145)
T WAVE AXIS: -22 DEGREES
VENTRICULAR RATE: 159 BPM
WBC # BLD AUTO: 11.26 THOUSAND/UL (ref 4.31–10.16)

## 2021-02-23 PROCEDURE — 82948 REAGENT STRIP/BLOOD GLUCOSE: CPT

## 2021-02-23 PROCEDURE — 99232 SBSQ HOSP IP/OBS MODERATE 35: CPT | Performed by: INTERNAL MEDICINE

## 2021-02-23 PROCEDURE — 93010 ELECTROCARDIOGRAM REPORT: CPT | Performed by: INTERNAL MEDICINE

## 2021-02-23 PROCEDURE — 99024 POSTOP FOLLOW-UP VISIT: CPT | Performed by: PODIATRIST

## 2021-02-23 PROCEDURE — 97164 PT RE-EVAL EST PLAN CARE: CPT

## 2021-02-23 PROCEDURE — 80048 BASIC METABOLIC PNL TOTAL CA: CPT | Performed by: STUDENT IN AN ORGANIZED HEALTH CARE EDUCATION/TRAINING PROGRAM

## 2021-02-23 PROCEDURE — 97605 NEG PRS WND THER DME<=50SQCM: CPT | Performed by: PODIATRIST

## 2021-02-23 PROCEDURE — 97168 OT RE-EVAL EST PLAN CARE: CPT

## 2021-02-23 PROCEDURE — 85027 COMPLETE CBC AUTOMATED: CPT | Performed by: STUDENT IN AN ORGANIZED HEALTH CARE EDUCATION/TRAINING PROGRAM

## 2021-02-23 RX ORDER — METOPROLOL TARTRATE 5 MG/5ML
5 INJECTION INTRAVENOUS ONCE
Status: COMPLETED | OUTPATIENT
Start: 2021-02-23 | End: 2021-02-23

## 2021-02-23 RX ORDER — METOPROLOL TARTRATE 50 MG/1
100 TABLET, FILM COATED ORAL EVERY 12 HOURS SCHEDULED
Status: DISCONTINUED | OUTPATIENT
Start: 2021-02-23 | End: 2021-03-10 | Stop reason: HOSPADM

## 2021-02-23 RX ADMIN — APIXABAN 5 MG: 5 TABLET, FILM COATED ORAL at 17:00

## 2021-02-23 RX ADMIN — PIPERACILLIN AND TAZOBACTAM 4.5 G: 36; 4.5 INJECTION, POWDER, FOR SOLUTION INTRAVENOUS at 06:28

## 2021-02-23 RX ADMIN — LISINOPRIL 40 MG: 20 TABLET ORAL at 08:50

## 2021-02-23 RX ADMIN — APIXABAN 5 MG: 5 TABLET, FILM COATED ORAL at 08:50

## 2021-02-23 RX ADMIN — SENNOSIDES, DOCUSATE SODIUM 1 TABLET: 8.6; 5 TABLET ORAL at 22:06

## 2021-02-23 RX ADMIN — INSULIN GLARGINE 15 UNITS: 100 INJECTION, SOLUTION SUBCUTANEOUS at 22:06

## 2021-02-23 RX ADMIN — METOROPROLOL TARTRATE 5 MG: 5 INJECTION, SOLUTION INTRAVENOUS at 16:59

## 2021-02-23 RX ADMIN — METOPROLOL TARTRATE 100 MG: 50 TABLET, FILM COATED ORAL at 22:49

## 2021-02-23 RX ADMIN — PIPERACILLIN AND TAZOBACTAM 4.5 G: 36; 4.5 INJECTION, POWDER, FOR SOLUTION INTRAVENOUS at 17:00

## 2021-02-23 RX ADMIN — METOPROLOL TARTRATE 50 MG: 50 TABLET, FILM COATED ORAL at 08:50

## 2021-02-23 RX ADMIN — PIPERACILLIN AND TAZOBACTAM 4.5 G: 36; 4.5 INJECTION, POWDER, FOR SOLUTION INTRAVENOUS at 12:21

## 2021-02-23 RX ADMIN — PIPERACILLIN AND TAZOBACTAM 4.5 G: 36; 4.5 INJECTION, POWDER, FOR SOLUTION INTRAVENOUS at 22:06

## 2021-02-23 RX ADMIN — METOROPROLOL TARTRATE 5 MG: 5 INJECTION, SOLUTION INTRAVENOUS at 14:55

## 2021-02-23 RX ADMIN — ATORVASTATIN CALCIUM 40 MG: 40 TABLET, FILM COATED ORAL at 08:50

## 2021-02-23 NOTE — PROGRESS NOTES
INTERNAL MEDICINE RESIDENCY PROGRESS NOTE     Name: Baron Britt  Age & Sex: 78 y o  male   MRN: [id number]  Unit/Bed#: [facility] 624-01   Encounter: [id number]  Team: SOD Team B     PATIENT INFORMATION     Name: Baron Britt  Age & Sex: 78 y o  male   MRN: [id number]  Hospital Stay Days: 7    ASSESSMENT/PLAN     Principal Problem:    Sepsis [facility])  Active Problems:    Hyperlipidemia    Type 2 diabetes mellitus with diabetic neuropathy (HCC)    Atrial fibrillation with RVR (HCC)    Essential hypertension    Congenital talipes equinovarus, right foot    Post-operative state    Cellulitis of extremity    Abscess      Essential hypertension  Assessment & Plan  - patient has history of longstanding hypertension on lisinopril 40mg daily and Cardizem 120mg BID  Currently stable  - continue lisinopril  - cardizem to change to metoprolol as detailed elsewhere  - trend BMP     Atrial fibrillation with RVR ([facility] )  Assessment & Plan  Patient has longstanding history of atrial fibrillation on Cardizem 120 BID for rate control and Eliquis for anticoagulation  Does not follow with cardiology outpatient  - Continue eliquis   - no indication to trend INR unless useful to surgical team  -currently rate controlled, 1 episode of AFib with RVR yesterday, responded well to 5 mg Lopressor  He had only received 1 dose of 50 mg metoprolol which was delayed due to procedure   -continue metoprolol to 50 mg q 12 for now, consider increasing if he remains uncontrolled  - metoprolol 5mg IV q6 PRN for HR >120  -  monitor on telemetry    Type 2 diabetes mellitus with diabetic neuropathy ([facility] )  Assessment & Plan  On sitagliptin, metformin, and glimepiride outpatient: all held on admission  Not previously on insulin  SSI ordered, some hyperglycemia noted this morning  Fasting blood sugar improved to 150s  -continue Lantus to 15 units q h s    Goal blood glucose 140-180  Continue to monitor, likely elevated in the setting of acute illness    Hyperlipidemia  Assessment & Plan  Stable  Continue atorvastatin 40mg    * Sepsis ([address] )  Assessment & Plan  Due to infected hardware/hematoma of R foot, now status post removal and washout, POD#5  Underwent TMA yesterday postop day 1  - management of right foot as per surgery  - blood cultures negative x 72 hours  - ID consulted, appreciated  - Zosyn as per Infectious Disease        Disposition:  Podiatry primary, AFib and diabetes mellitus currently controlled  Continue to follow     SUBJECTIVE     Patient seen and examined  No acute events overnight  Tolerated procedure well  Does not have much of an appetite  Pain is currently controlled  He denies any chest pain, palpitations, nausea, vomiting  Express some frustration on the progression of his disease  Rest of ROS was negative  OBJECTIVE     Vitals:    21 2118 21 2303 21 0250 21 0740   BP: 149/84 144/95 140/92 141/75   Pulse: 91 (!) 119 104 100   Resp: 17 17 17 18   Temp: 98 °F (36 7 °C) 98 8 °F (37 1 °C) 99 1 °F (37 3 °C) 98 9 °F (37 2 °C)   TempSrc:       SpO2: 97% 98% 94% 94%   Weight:       Height:          Temperature:   Temp (24hrs), Av 3 °F (36 8 °C), Min:97 6 °F (36 4 °C), Max:99 1 °F (37 3 °C)    Temperature: 98 9 °F (37 2 °C)  Intake & Output:  I/O        07 -  0700  07 -  0700  07 -  0700    P  O  480 0     I V  (mL/kg) 1648 8 (15 1) 742 5 (6 8)     IV Piggyback 300 100     Total Intake(mL/kg) 2428 8 (22 3) 842 5 (7 7)     Urine (mL/kg/hr) 1375 (0 5) 1650 (0 6) 225 (1 1)    Stool 0 0     Blood  100     Total Output 1375 1750 225    Net +1053 8 -907 5 -225           Unmeasured Urine Occurrence  3 x     Unmeasured Stool Occurrence 1 x 1 x         Weights:   IBW: 86 8 kg    Body mass index is 29 33 kg/m²  Weight (last 2 days)     None        Physical Exam  Constitutional:       General: He is not in acute distress  Appearance: Normal appearance   He is not ill-appearing  HENT:      Head: Normocephalic and atraumatic  Mouth/Throat:      Mouth: Mucous membranes are moist    Eyes:      Extraocular Movements: Extraocular movements intact  Conjunctiva/sclera: Conjunctivae normal       Pupils: Pupils are equal, round, and reactive to light  Cardiovascular:      Rate and Rhythm: Normal rate and regular rhythm  Pulses: Normal pulses  Heart sounds: Normal heart sounds  No murmur  No friction rub  No gallop  Pulmonary:      Effort: Pulmonary effort is normal  No respiratory distress  Breath sounds: Normal breath sounds  No wheezing or rales  Abdominal:      General: Abdomen is flat  Bowel sounds are normal  There is no distension  Palpations: Abdomen is soft  Tenderness: There is no abdominal tenderness  There is no guarding  Musculoskeletal:         General: Deformity present  Right lower leg: No edema  Left lower leg: No edema  Comments: Right foot wound noted, dressing clean and intact   Skin:     General: Skin is warm and dry  Neurological:      General: No focal deficit present  Mental Status: He is alert and oriented to person, place, and time  Psychiatric:         Mood and Affect: Mood normal          Behavior: Behavior normal         LABORATORY DATA     Labs: I have personally reviewed pertinent reports  Results from last 7 days   Lab Units 02/23/21  0510 02/22/21  0551 02/21/21  1002  02/16/21  1648   WBC Thousand/uL 11 26* 9 89 9 16   < > 14 65*   HEMOGLOBIN g/dL 11 6* 11 1* 11 8*   < > 12 3   HEMATOCRIT % 35 8* 34 9* 36 5   < > 37 3   PLATELETS Thousands/uL 456* 387 360   < > 186   NEUTROS PCT %  --   --   --   --  88*   MONOS PCT %  --   --   --   --  6    < > = values in this interval not displayed        Results from last 7 days   Lab Units 02/23/21  0510 02/22/21  0551 02/21/21  1002  02/16/21  1648   POTASSIUM mmol/L 3 9 3 8 3 7   < > 3 7   CHLORIDE mmol/L 111* 112* 110*   < > 106   CO2 mmol/L 28 24 27   < > 22   BUN mg/dL 8 8 10   < > 12   CREATININE mg/dL 0 91 0 82 0 93   < > 1 05   CALCIUM mg/dL 8 8 8 8 8 9   < > 8 8   ALK PHOS U/L  --   --   --   --  120*   ALT U/L  --   --   --   --  61   AST U/L  --   --   --   --  52*    < > = values in this interval not displayed  Results from last 7 days   Lab Units 02/18/21  0521 02/17/21  0752 02/16/21  1648   INR  1 36* 1 47* 1 48*   PTT seconds  --   --  37     Results from last 7 days   Lab Units 02/16/21  1855   LACTIC ACID mmol/L 1 2     Results from last 7 days   Lab Units 02/16/21  1648   TROPONIN I ng/mL <0 02       IMAGING & DIAGNOSTIC TESTING     Radiology Results: I have personally reviewed pertinent reports  Xr Foot 2 Vw Right    Result Date: 2/17/2021  Impression: Findings suspicious for infection involving the 4th and 5th tarsometatarsal joints, less conspicuous previously  Status post surgery Workstation performed: [id number]     Xr Foot Right 3+ Views    Result Date: 2/18/2021  Impression: Removal of the external fixation device  Otherwise stable alignment of the post surgical changes  Workstation performed: [id number]     Other Diagnostic Testing: I have personally reviewed pertinent reports      ACTIVE MEDICATIONS     Current Facility-Administered Medications   Medication Dose Route Frequency    acetaminophen (TYLENOL) tablet 650 mg  650 mg Oral Q6H PRN    apixaban (ELIQUIS) tablet 5 mg  5 mg Oral BID    atorvastatin (LIPITOR) tablet 40 mg  40 mg Oral Daily    HYDROmorphone (DILAUDID) injection 0 5 mg  0 5 mg Intravenous Q4H PRN    insulin glargine (LANTUS) subcutaneous injection 15 Units 0 15 mL  15 Units Subcutaneous HS    insulin lispro (HumaLOG) 100 units/mL subcutaneous injection 1-6 Units  1-6 Units Subcutaneous 4x Daily (AC & HS)    lisinopril (ZESTRIL) tablet 40 mg  40 mg Oral Daily    metoprolol (LOPRESSOR) injection 5 mg  5 mg Intravenous Q6H PRN    metoprolol tartrate (LOPRESSOR) tablet 50 mg  50 mg Oral Q12H [facility]    oxyCODONE (ROXICODONE) immediate release tablet 10 mg  10 mg Oral Q4H PRN    oxyCODONE (ROXICODONE) IR tablet 5 mg  5 mg Oral Q4H PRN    piperacillin-tazobactam (ZOSYN) 4 5 g in sodium chloride 0 9 % 100 mL IVPB  4 5 g Intravenous Q6H    polyethylene glycol (MIRALAX) packet 17 g  17 g Oral Daily PRN    senna-docusate sodium (SENOKOT S) 8 6-50 mg per tablet 1 tablet  1 tablet Oral HS    sodium chloride 0 9 % infusion  75 mL/hr Intravenous Continuous       VTE Pharmacologic Prophylaxis: Reason for no pharmacologic prophylaxis Eliquis  VTE Mechanical Prophylaxis: sequential compression device    Portions of the record may have been created with voice recognition software  Occasional wrong word or "sound a like" substitutions may have occurred due to the inherent limitations of voice recognition software    Read the chart carefully and recognize, using context, where substitutions have occurred   ==  Dawn Farah, [address] [facility]  Internal Medicine Residency PGY-2

## 2021-02-23 NOTE — ASSESSMENT & PLAN NOTE
- patient has history of longstanding hypertension on lisinopril 40mg daily and Cardizem 120mg BID  Currently stable  - continue lisinopril  - cardizem to change to metoprolol as detailed elsewhere  - trend BMP

## 2021-02-23 NOTE — QUICK NOTE
Per nurse, patient was tachycardic into 130-140s after returning from PACU, however remained asymptomatic  Upon my evaluation, patient denies any chest pain, shortness of breath, lightheadedness, dizziness, headache, vision changes  Physical exam unremarkable other than tachycardia  Blood pressure 142/94  -150s  EKG shows atrial fibrillation with RVR without any ischemic changes  Plan:  - IV metoprolol 5 mg  - continue to monitor    Update: HR improved to low 100s-110s

## 2021-02-23 NOTE — ASSESSMENT & PLAN NOTE
Due to infected hardware/hematoma of R foot, now status post removal and washout, POD#5  Underwent TMA yesterday postop day 1  - management of right foot as per surgery  - blood cultures negative x 72 hours  - ID consulted, appreciated  - Zosyn as per Infectious Disease

## 2021-02-23 NOTE — PLAN OF CARE
Problem: PHYSICAL THERAPY ADULT  Goal: Performs mobility at highest level of function for planned discharge setting  See evaluation for individualized goals  Description: Treatment/Interventions: Functional transfer training, LE strengthening/ROM, Elevations, Therapeutic exercise, Endurance training, Bed mobility, Gait training, OT  Equipment Recommended: Walker(at this time)       See flowsheet documentation for full assessment, interventions and recommendations  Note: Prognosis: Good  Problem List: Decreased endurance, Impaired balance, Decreased mobility, Impaired hearing, Orthopedic restrictions, Pain  Assessment: Pt is a 78 y o  male admitted to B on 2/16/2021 with primary dx of sepsis and congenital talipes equinovarus  He was originally evaluated by PT on 2/20/2021  He underwent AMPUTATION TRANSMETATARSAL (TMA), REMOVAL NAIL IM T2 ICF HARDWARE (Right) on 2/22/2021 and has NWB orders on RLE  PT was consulted to re-evaluate pt's functional mobility and discharge needs s/p transmetatarsal amputation  Upon evaluation, patient required S for bed mobility, ModAx1 for sit to stand transfers, and MinAx1 for ambulation with RW and hop to pattern  Pt was successful in maintaining his NWBS  He would benefit from transferring from elevated surfaces secondary to his height  Pt's functional impairments include: decreased balance, endurance, activity tolerance, and mobility and gait deviations secondary to being NWB RLE  At conclusion of eval, pt remained seated in chair with chair alarm, phone, call bell, and all other personal needs within reach  Pt would benefit from skilled PT to address his functional mobility deficits and promote his safety and QOL  D/C recommendations are rehab  Barriers to Discharge Comments: At conclusion of PT session pt returned BTB with phone and call bell within reach  Pt denies any further questions at this time   The patient's AM-PAC Basic Mobility Inpatient Short Form Raw Score is 14, Standardized Score is 35 55  A standardized score less than 42 9 suggests the patient may benefit from discharge to post-acute rehabilitation services  However, anticipate pt will progress quickly to allow for d/c home as he has good social support and an accessible home  Please also refer to the recommendation of the Physical Therapist for safe discharge planning  Recommending home with social support and HHPT upon d/c    PT Discharge Recommendation: Post-Acute Rehabilitation Services     PT - OK to Discharge: Yes(to rehab when medically cleared)    See flowsheet documentation for full assessment

## 2021-02-23 NOTE — ASSESSMENT & PLAN NOTE
On sitagliptin, metformin, and glimepiride outpatient: all held on admission  Not previously on insulin  SSI ordered, some hyperglycemia noted this morning  Fasting blood sugar improved to 150s  -continue Lantus to 15 units q h s    Goal blood glucose 140-180  Continue to monitor, likely elevated in the setting of acute illness

## 2021-02-23 NOTE — OCCUPATIONAL THERAPY NOTE
Occupational Therapy REEvaluation     Patient Name: Leah Ramon    Today's Date: 2/23/2021  Problem List  Principal Problem:    Sepsis [facility])  Active Problems:    Hyperlipidemia    Type 2 diabetes mellitus with diabetic neuropathy (HCC)    Atrial fibrillation with RVR ([address] )    Essential hypertension    Congenital talipes equinovarus, right foot    Post-operative state    Cellulitis of extremity    Abscess    Past Medical History  Past Medical History:   Diagnosis Date    Arthritis     Atrial fibrillation ([address] )     Diagnosed 11/2018    Benign prostate hyperplasia 04/2002    Cellulitis     right lower leg     Colon polyp 2006    Diabetes mellitus ([address] )     Hearing aid worn     bilat    Hearing loss     90% loss left ear and 40% right ear    History of clubfoot     "since birth"    History of pneumonia     History of TIA (transient ischemic attack) 04/25/2017 11/30/18 pt denies    Hyperlipidemia 5/15/2014    Hypertension     Infectious viral hepatitis     "cant remember type"    Irregular heart beat     Neuropathy     both feet    Osteomyelitis [facility] )     right great toe    Right middle lobe pulmonary nodule 11/6/2018    Seasickness     Teeth missing     Type 2 diabetes mellitus with diabetic neuropathy ([facility] ) 11/6/2018    Wears glasses     reading    Wound, open     bottom of right foot     Past Surgical History  Past Surgical History:   Procedure Laterality Date    BUNIONECTOMY Right 12/4/2018    Procedure: 5TH METATARSAL BONE PARTIAL RESECTION, FULL THICKNESS DEBRIDEMENT OF DIABETIC ULCER;  Surgeon: Tommy Sotomayor DPM;  [facility];  Service: Podiatry    CLUB FOOT RELEASE Bilateral     COLONOSCOPY      EXTERNAL FIXATOR APPLICATION Right 4/47/0915    Procedure: Application multiplane external fixation;  Surgeon: Andi Beck DPM;  [facility];  Service: Podiatry    FOOT HARDWARE REMOVAL Right 2/17/2021    Procedure: REMOVAL EXTERNAL FIXATOR;  Surgeon: Cristian Akins Juliet White DPM;  [facility];  Service: Podiatry    INCISION AND DRAINAGE OF WOUND Right 2/17/2021    Procedure: INCISION AND DRAINAGE (I&D) EXTREMITY;  Surgeon: La Lambert DPM;  [facility];  Service: Podiatry    [address] Right 2/22/2021    Procedure: AMPUTATION TRANSMETATARSAL (TMA), REMOVAL NAIL IM T2 ICF HARDWARE;  Surgeon: La Lambert DPM;  [facility];  Service: Podiatry    AL FUSION FOOT BONES,MIDTARSAL,OSTEOTMY Right 2/12/2021    Procedure: foot ARTHRODESIS/FUSION;  Surgeon: La Lambert DPM;  [facility];  Service: Podiatry    AL LENGTH/SHORT LEG/ANKL TENDON,SINGLE Right 2/12/2021    Procedure: LENGTHEN TIBIAL TENDON, TRANS HEEL CORD;  Surgeon: La Lambert DPM;  [facility];  Service: Podiatry    AL PART [facility] Right 12/29/2020    Procedure: EXCISION EXOSTOSIS;  Surgeon: La Lambert DPM;  [facility];  Service: [address] WND EXTEN/COMPLIC Right 45/25/6304    Procedure: PRIMARY DELAYED CLOSURE;  Surgeon: La Lambert DPM;  [facility];  Service: Podiatry    TOE AMPUTATION Right     partial great toe    VASECTOMY  1992 02/23/21 1446   OT Last Visit   OT Visit Date 02/23/21   Note Type   Note type Re-Evaluation   Restrictions/Precautions   Weight Bearing Precautions Per Order Yes   RLE Weight Bearing Per Order NWB  (WOUND VAC)   Other Precautions Chair Alarm;WBS;Multiple lines; Fall Risk;Pain;Hard of hearing  (WOUND VAC)   Pain Assessment   Pain Assessment Tool 0-10   Pain Score No Pain   Patient's Stated Pain Goal No pain   Hospital Pain Intervention(s) Repositioned; Ambulation/increased activity; Emotional support   Home Living   Type of Home Mobile home   Home Layout One level;Ramped entrance   Bathroom Shower/Tub Walk-in shower   [facility] Maia Coins; Wheelchair-manual;Crutches   Additional Comments PT REPORTS USE OF CRUTCHES PTA    Prior Function   Level of [place] Independent with ADLs and functional mobility   Lives With Spouse   Receives Help From Family   ADL Assistance Independent   IADLs Independent   Falls in the last 6 months 0   Vocational Full time employment   Lifestyle   Autonomy Pt reports that at baseline he is I with Sven Llanos and has been using crutches for mobility PTA  (+)     Reciprocal Relationships Pt lives with his wife who he reports is able to assist as needed upon d/c     Service 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   [address]  [place] [address]  [address] 3  Moderate Assistance   [address] 4  [address] 3  [place] [address]  3  Moderate [address] 3  Moderate Assistance   Bed Mobility   Supine to Sit 5  Supervision   Additional items Assist x 1; Increased time required;Verbal cues   Sit to Supine Unable to assess  (PT LEFT OOB WITH ALL NEEDS IN REACH + ALARM ON )   Transfers   Sit to Stand 3  Moderate assistance   Additional items Assist x 1; Increased time required;Verbal cues   Stand to Sit 3  Moderate assistance   Additional items Assist x 1; Increased time required;Verbal cues   Functional Mobility   Functional Mobility 4  Minimal assistance   Additional items Rolling walker   Balance   Static Sitting Good   Static Standing Fair -   Ambulatory Poor +   Activity Tolerance   Activity Tolerance Patient limited by fatigue   Medical Staff Made Aware PT/SPT      Nurse Made Aware APPROPRIATE TO SEE    RUE Assessment   RUE Assessment WFL   LUE Assessment   LUE Assessment WFL   Hand Function   Gross Motor Coordination Functional   Fine Motor Coordination Functional   Cognition   Overall Cognitive Status WFL   Arousal/Participation Alert; Cooperative   Attention Within functional limits   Orientation Level Oriented X4   Memory Within functional limits   Following Commands Follows multistep commands without difficulty   Comments PT IS PLEASANT AND COOPERATIVE    Assessment   Limitation Decreased ADL status; Decreased Safe judgement during ADL;Decreased cognition;Decreased endurance;Decreased self-care trans;Decreased high-level ADLs   Prognosis Good   Assessment PT SEEN FOR OT REEVAL S/P TMA WITH REMOVAL OF IMN T2 ICF HARDWARE ON 2/22/21  PT REMAINS NWB ON RLE WITH WOUND VAC IN PLACE  SEE INITIAL OT EVAL FOR FURTHER DETAILS  PT CURRENTLY REQUIRES OVERALL MOD A WITH ADLS, MOD A WITH TRANSFERS AND MIN A FUNCTIONAL MOBILITY WITH USE OF RW  PT DEMONSTRATED G CARRY OVER OF NWB STATUS T/O SESSION  PT IS LIMITED 2' PAIN, FATIGUE, IMPAIRED BALANCE, FALL RISK , WB RESTRICTIONS, ORTHOPEDIC RESTRICTIONS, INACCESSIBLE HOME ENVIRONMENT and OVERALL LIMITED ACTIVITY TOLERANCE  PT EDUCATED ON CARRY OVER OF WB STATUS, DEEP BREATHING TECHNIQUES T/O ACTIVITY, SLOWING OF PACE, ENERGY CONSERVATION TECHNIQUES FOR CARRY OVER UPON D/C, INCREASED FAMILY SUPPORT and CONTINUE PARTICIPATION IN SELF-CARE/MOBILITY WITH STAFF [address]   The patient's raw score on the AM-PAC Daily Activity inpatient short form is 17, standardized score is 37 26, less than 39 4  Patients at this level are likely to benefit from DC to post-acute rehabilitation services  Please refer to the recommendation of the Occupational Therapist for safe DC planning  FROM AN OCCUPATIONAL THERAPY PERSPECTIVE, PT WOULD BENEFIT FROM ADDITIONAL OT SERVICES IN AN INPT REHAB SETTING UPON D/C  INITIAL OT GOALS REMAIN APPROPRIATE- SEE INITIAL OT EVAL FOR GOALS  WILL CONT TO FOLLOW      Goals   Patient Goals TO GET OOB    LTG Time Frame 10-14   Long Term Goal #1 SEE INITIAL OT EVAL    Plan   Treatment Interventions ADL retraining;Functional transfer training; Endurance training;Cognitive reorientation;Patient/family training;Equipment evaluation/education; Compensatory technique education; Energy conservation; Activityengagement   Goal Expiration Date 03/06/21   OT Treatment Day 2   OT Frequency 3-5x/wk   Recommendation   OT Discharge Recommendation Post-Acute Rehabilitation Services   Equipment Recommended Bedside commode   Commode Type Standard   OT - OK to Discharge Yes   AM-PAC Daily Activity Inpatient   Lower Body Dressing 2   Bathing 2   Toileting 2   Upper Body Dressing 3   Grooming 4   Eating 4   Daily Activity Raw Score 17   Daily Activity Standardized Score (Calc for Raw Score >=11) 37 26   AM-PAC Applied Cognition Inpatient   Following a Speech/Presentation 3   Understanding Ordinary Conversation 4   Taking Medications 3   Remembering Where Things Are Placed or Put Away 3   Remembering List of 4-5 Errands 3   Taking Care of Complicated Tasks 3   Applied Cognition Raw Score 19   Applied Cognition Standardized Score 39 77   Modified Regan Scale   Modified Corson Scale 4     SEE INITIAL OT EVAL FOR GOALS      Documentation completed by MIRYAM Sanchez, OTR/L

## 2021-02-23 NOTE — QUICK NOTE
On post operative call Dr Nishant Campo was made aware that his wife caught him crawling on the ground on hands and knees in the time between discharge from the hospital and presentation to the ED  He had not revealed this information previously only that the leg had been bumped on the bed

## 2021-02-23 NOTE — PROGRESS NOTES
Progress Note - Infectious Disease   Javy Hand  78 y o  male MRN: [id number]  Unit/Bed#: [facility] 624-01 Encounter: [id number]      Impression/Plan:    1- severe sepsis, POA:  severe sepsis secondary to  infected hematoma involving right foot with possible associated hardware infection   Blood culture remains negative   No other infectious focus identified based on review of system and physical exam  -  antibiotics as below  -  supportive treatment as per Internal Medicine team       2- postoperative bleeding  complicated by hematoma formation,  compartment syndrome,  with superimposed infection:   Patient was taken to operation room 2/17/2021 where he underwent external fixator removal, hematoma evacuation, fasciotomy   Small amount of purulence noted   Wound cultures collected intraoperatively from lateral and medial aspect of the foot both growing Enterobacter cloaca   E faecalis  also growing in 1 operative culture specimen  Due to progressive ischemia, patient was taken again to OR yesterday where he underwent TMA  Operative report reviewed and case discussed with podiatrist Dr Ankush Millan  Hardware was removed  All infected tissue removed  -  continue Zosyn IV for today for any possible residual skin or soft tissue infection  - as infected tissue and hardware in its entirety now removed, will consider switching to p o  Antibiotic over the next 24 hours to finish 1 week of postoperative course  - podiatry follow-up, wound dressing changes planned for 02/26/2021 and then based on wound appearance, patient might need Plastic surgery evaluation for wound coverage    Will continue to follow        3- diabetes mellitus type 2, A1c 7 8%   Risk factor for infectious complications as mentioned above  -  management as per primary service team        4- atrial fibrillation, on anticoagulation:   Anticoagulation probably caused postoperative bleeding and thus hematoma formation as mentioned above    Plan mentioned above discussed with patient and with podiatrist      Antibiotics:  Zosyn day 5  I and D and facial tummy, postop day 6  TMA postop day 1    Subjective:  Patient has no fever, chills, sweats; no nausea, vomiting, diarrhea; no cough, shortness of breath; no pain  No new symptoms  Objective:  Vitals:  Temp:  [97 6 °F (36 4 °C)-99 1 °F (37 3 °C)] 98 1 °F (36 7 °C)  HR:  [] 100  Resp:  [12-22] 17  BP: (101-160)/(52-95) 140/90  SpO2:  [94 %-100 %] 97 %  Temp (24hrs), Av 3 °F (36 8 °C), Min:97 6 °F (36 4 °C), Max:99 1 °F (37 3 °C)  Current: Temperature: 98 1 °F (36 7 °C)    Physical Exam:   General Appearance:  Alert, interactive, nontoxic, no acute distress  Throat: Oropharynx moist without lesions  Lungs:   Clear to auscultation bilaterally; no wheezes, rhonchi or rales; respirations unlabored   Heart:  RRR; no murmur, rub or gallop   Abdomen:   Soft, non-tender, non-distended, positive bowel sounds  Extremities: No clubbing, cyanosis or edema  Dressing over right TMA site with no breakthrough bleeding or drainage   Skin: No new rashes or lesions  Labs, Imaging, & Other studies:   All pertinent labs and imaging studies were personally reviewed  Results from last 7 days   Lab Units 21  0510 21  0551 21  1002   WBC Thousand/uL 11 26* 9 89 9 16   HEMOGLOBIN g/dL 11 6* 11 1* 11 8*   PLATELETS Thousands/uL 456* 387 360     Results from last 7 days   Lab Units 21  0510 21  0551 21  1002  21  1648   SODIUM mmol/L 144 142 142   < > 137   POTASSIUM mmol/L 3 9 3 8 3 7   < > 3 7   CHLORIDE mmol/L 111* 112* 110*   < > 106   CO2 mmol/L 28 24 27   < > 22   BUN mg/dL 8 8 10   < > 12   CREATININE mg/dL 0 91 0 82 0 93   < > 1 05   EGFR ml/min/1 73sq m 86 91 84   < > 73   CALCIUM mg/dL 8 8 8 8 8 9   < > 8 8   AST U/L  --   --   --   --  52*   ALT U/L  --   --   --   --  61   ALK PHOS U/L  --   --   --   --  120*    < > = values in this interval not displayed  Results from last 7 days   Lab Units 02/18/21  1328 02/17/21  1636 02/17/21  1634 02/16/21  1648 02/16/21  1630   BLOOD CULTURE   --   --   --  No Growth After 5 Days  No Growth After 5 Days     GRAM STAIN RESULT   --  1+ Polys*  1+ Gram negative rods* 1+ Polys  No bacteria seen  --   --    WOUND CULTURE   --  3+ Growth of Enterobacter cloacae complex*  1+ Growth of  2+ Growth of Enterobacter cloacae complex*  1+ Growth of Enterococcus faecalis*  1+ Growth of   --   --    MRSA CULTURE ONLY  No Methicillin Resistant Staphlyococcus aureus (MRSA) isolated  --   --   --   --      Results from last 7 days   Lab Units 02/20/21  1000 02/16/21  1648   PROCALCITONIN ng/ml 5 33* 1 08*

## 2021-02-23 NOTE — PLAN OF CARE
Problem: OCCUPATIONAL THERAPY ADULT  Goal: Performs self-care activities at highest level of function for planned discharge setting  See evaluation for individualized goals  Description: Treatment Interventions: ADL retraining, Functional transfer training, UE strengthening/ROM, Patient/family training, Endurance training, Equipment evaluation/education, Compensatory technique education, Continued evaluation, Energy conservation, Activityengagement          See flowsheet documentation for full assessment, interventions and recommendations  2/23/2021 1545 by Titus Bacon OT  Outcome: Progressing  Note: Limitation: Decreased ADL status, Decreased Safe judgement during ADL, Decreased cognition, Decreased endurance, Decreased self-care trans, Decreased high-level ADLs  Prognosis: Good  Assessment: PT SEEN FOR OT REEVAL S/P TMA WITH REMOVAL OF IMN T2 ICF HARDWARE ON 2/22/21  PT REMAINS NWB ON RLE WITH WOUND VAC IN PLACE  SEE INITIAL OT EVAL FOR FURTHER DETAILS  PT CURRENTLY REQUIRES OVERALL MOD A WITH ADLS, MOD A WITH TRANSFERS AND MIN A FUNCTIONAL MOBILITY WITH USE OF RW  PT DEMONSTRATED G CARRY OVER OF NWB STATUS T/O SESSION  PT IS LIMITED 2' PAIN, FATIGUE, IMPAIRED BALANCE, FALL RISK , WB RESTRICTIONS, ORTHOPEDIC RESTRICTIONS, INACCESSIBLE HOME ENVIRONMENT and OVERALL LIMITED ACTIVITY TOLERANCE  PT EDUCATED ON CARRY OVER OF WB STATUS, DEEP BREATHING TECHNIQUES T/O ACTIVITY, SLOWING OF PACE, ENERGY CONSERVATION TECHNIQUES FOR CARRY OVER UPON D/C, INCREASED FAMILY SUPPORT and CONTINUE PARTICIPATION IN SELF-CARE/MOBILITY WITH STAFF Tanna Dickson   The patient's raw score on the AM-PAC Daily Activity inpatient short form is 17, standardized score is 37 26, less than 39 4  Patients at this level are likely to benefit from DC to post-acute rehabilitation services  Please refer to the recommendation of the Occupational Therapist for safe DC planning   FROM AN OCCUPATIONAL THERAPY PERSPECTIVE, PT WOULD BENEFIT FROM ADDITIONAL OT SERVICES IN AN INPT REHAB SETTING UPON D/C  INITIAL OT GOALS REMAIN APPROPRIATE- SEE INITIAL OT EVAL FOR GOALS  WILL CONT TO FOLLOW  OT Discharge Recommendation: Post-Acute Rehabilitation Services  OT - OK to Discharge: Yes    2/23/2021 1544 by Titus Bacon OT  Outcome: Progressing  Note: Limitation: Decreased ADL status, Decreased Safe judgement during ADL, Decreased cognition, Decreased endurance, Decreased self-care trans, Decreased high-level ADLs  Prognosis: Good  Assessment: PT SEEN FOR OT REEVAL S/P TMA WITH REMOVAL OF IMN T2 ICF HARDWARE ON 2/22/21  PT REMAINS NWB ON RLE WITH WOUND VAC IN PLACE  SEE INITIAL OT EVAL FOR FURTHER DETAILS  PT CURRENTLY REQUIRES OVERALL MOD A WITH ADLS, MOD A WITH TRANSFERS AND MIN A FUNCTIONAL MOBILITY WITH USE OF RW  PT DEMONSTRATED G CARRY OVER OF NWB STATUS T/O SESSION  PT IS LIMITED 2' PAIN, FATIGUE, IMPAIRED BALANCE, FALL RISK , WB RESTRICTIONS, ORTHOPEDIC RESTRICTIONS, INACCESSIBLE HOME ENVIRONMENT and OVERALL LIMITED ACTIVITY TOLERANCE  PT EDUCATED ON CARRY OVER OF WB STATUS, DEEP BREATHING TECHNIQUES T/O ACTIVITY, SLOWING OF PACE, ENERGY CONSERVATION TECHNIQUES FOR CARRY OVER UPON D/C, INCREASED FAMILY SUPPORT and CONTINUE PARTICIPATION IN SELF-CARE/MOBILITY WITH STAFF [address]   The patient's raw score on the AM-PAC Daily Activity inpatient short form is 17, standardized score is 37 26, less than 39 4  Patients at this level are likely to benefit from DC to post-acute rehabilitation services  Please refer to the recommendation of the Occupational Therapist for safe DC planning  FROM AN OCCUPATIONAL THERAPY PERSPECTIVE, PT WOULD BENEFIT FROM ADDITIONAL OT SERVICES IN AN INPT REHAB SETTING UPON D/C  INITIAL OT GOALS REMAIN APPROPRIATE- SEE INITIAL OT EVAL FOR GOALS  WILL CONT TO FOLLOW  OT Discharge Recommendation: Post-Acute Rehabilitation Services  OT - OK to Discharge:  Yes

## 2021-02-23 NOTE — PROGRESS NOTES
[facility] Podiatry - Progress Note  Patient: Baron Britt  78 y o  male   MRN: [id number]  PCP: Lynda Douglass MD  Unit/Bed#: [facility] 980-03 Encounter: [id number]  Date Of Visit: 21    ASSESSMENT:    Baron Birtt  is a 78 y o  male with:    1  Right foot hematoma, surgical site infection, ischemia  - S/p exfix removal, I&D, fasciotomy (DOS 21)  - s/p TMA of left foot   2  Congenital talipes equinovarus right foot             - s/p midfoot arthrodesis, exfix application (DOS 83)  3  T2Dm, last A1C 7 8 %  4  HTN  5  Altered mental status  6  Constipation  7  atrial fibrillation  8  Sepsis on admission - resolved      PLAN:    · Gave realistic chances of limb salvage  His foot's condition is tenuous and discussed the importance of compliance in his current state  Stressed that given his post operative non-compliance that he is at risk of BKA given his lack of stability  · Will apply wound vac today with separate procedure note  · Will remove wound vac  with Dr Denita Price and assess healing potential  If soft tissue coverage appears to not be viable then will discuss case with Plastics team    · Continue to encourage elevate foot for DTI  · eliquis for a fib  SUBJECTIVE:     The patient was seen, evaluated, and assessed at bedside today  The patient was awake, alert, and in no acute distress  No acute events overnight  The patient reports he is   Patient denies N/V/F/chills/SOB/CP  OBJECTIVE:     Vitals:   /75   Pulse 100   Temp 98 9 °F (37 2 °C)   Resp 18   Ht 6' 4" (1 93 m)   Wt 109 kg (240 lb 15 4 oz)   SpO2 94%   BMI 29 33 kg/m²     Temp (24hrs), Av 3 °F (36 8 °C), Min:97 6 °F (36 4 °C), Max:99 1 °F (37 3 °C)      Physical Exam :     General:  Alert, cooperative, and in no distress  Lower extremity exam:  Cardiovascular status at baseline  Neurological status at baseline  Musculoskeletal status at baseline  No calf tenderness noted bilaterally       Dressing left intact will change vac later today      Additional Data:     Labs:    Results from last 7 days   Lab Units 02/23/21  0510  02/16/21  1648   WBC Thousand/uL 11 26*   < > 14 65*   HEMOGLOBIN g/dL 11 6*   < > 12 3   HEMATOCRIT % 35 8*   < > 37 3   PLATELETS Thousands/uL 456*   < > 186   NEUTROS PCT %  --   --  88*   LYMPHS PCT %  --   --  5*   MONOS PCT %  --   --  6   EOS PCT %  --   --  0    < > = values in this interval not displayed  Results from last 7 days   Lab Units 02/23/21  0510  02/16/21  1648   POTASSIUM mmol/L 3 9   < > 3 7   CHLORIDE mmol/L 111*   < > 106   CO2 mmol/L 28   < > 22   BUN mg/dL 8   < > 12   CREATININE mg/dL 0 91   < > 1 05   CALCIUM mg/dL 8 8   < > 8 8   ALK PHOS U/L  --   --  120*   ALT U/L  --   --  61   AST U/L  --   --  52*    < > = values in this interval not displayed  Results from last 7 days   Lab Units 02/18/21  0521   INR  1 36*       * I Have Reviewed All Lab Data Listed Above  Recent Cultures (last 7 days):     Results from last 7 days   Lab Units 02/17/21  1636 02/17/21  1634 02/16/21  1648 02/16/21  1630   BLOOD CULTURE   --   --  No Growth After 5 Days  No Growth After 5 Days  GRAM STAIN RESULT  1+ Polys*  1+ Gram negative rods* 1+ Polys  No bacteria seen  --   --    WOUND CULTURE  3+ Growth of Enterobacter cloacae complex*  1+ Growth of  2+ Growth of Enterobacter cloacae complex*  1+ Growth of Enterococcus faecalis*  1+ Growth of   --   --      Results from last 7 days   Lab Units 02/17/21  1636 02/17/21  1634   ANAEROBIC CULTURE  No anaerobes isolated No anaerobes isolated       Imaging: I have personally reviewed pertinent films in PACS  Pathology, and Other Studies: I have personally reviewed pertinent reports  ** Please Note: Portions of the record may have been created with voice recognition software  Occasional wrong word or "sound a like" substitutions may have occurred due to the inherent limitations of voice recognition software   Read the chart carefully and recognize, using context, where substitutions have occurred   **

## 2021-02-23 NOTE — ASSESSMENT & PLAN NOTE
Patient has longstanding history of atrial fibrillation on Cardizem 120 BID for rate control and Eliquis for anticoagulation   Does not follow with cardiology outpatient  - Continue eliquis   - no indication to trend INR unless useful to surgical team  -currently rate controlled, 1 episode of AFib with RVR yesterday, responded well to 5 mg Lopressor  He had only received 1 dose of 50 mg metoprolol which was delayed due to procedure   -continue metoprolol to 50 mg q 12 for now, consider increasing if he remains uncontrolled  - metoprolol 5mg IV q6 PRN for HR >120  -  monitor on telemetry

## 2021-02-23 NOTE — PHYSICAL THERAPY NOTE
PHYSICAL THERAPY RE-EVALUATION          Patient Name: Baron Britt  Today's Date: 2/23/2021 02/23/21 1445   PT Last Visit   PT Visit Date 02/23/21   Note Type   Note type Re-Evaluation   Pain Assessment   Pain Assessment Tool 0-10   Pain Score No Pain   Hospital Pain Intervention(s) Repositioned; Ambulation/increased activity; Emotional support;Elevated   Home Living   Type of [address] entrance; Able to live on main level with bedroom/bathroom; Performs ADLs on one level; One level   Bathroom Shower/Tub Walk-in shower   New [place]; Wheelchair-manual;Crutches  (RW)   Additional Comments pt reports using crutches PTA for mobility   Prior Function   Level of [place] Independent with ADLs and functional mobility   Lives With Spouse   Receives Help From Family   ADL Assistance Independent   IADLs Independent   Falls in the last 6 months 0   Comments pt reports living with his wife who is able to provide assistance as needed   Restrictions/Precautions   Weight Bearing Precautions Per Order Yes   RLE Weight Bearing Per Order NWB   Other Precautions Multiple lines; Fall Risk; Chair Alarm  (Wound vac)   General   Family/Caregiver Present Yes  (wife)   Cognition   Overall Cognitive Status WFL   Arousal/Participation Cooperative   Attention Within functional limits   Orientation Level Oriented X4   Memory Within functional limits   Following Commands Follows all commands and directions without difficulty   Comments pt pleasant to work with    RUE Assessment   RUE Assessment WFL   LUE Assessment   LUE Assessment WFL   RLE Assessment   RLE Assessment X  (NWB LLE)   LLE Assessment   LLE Assessment WFL   Bed Mobility   Supine to Sit 5  Supervision   Sit to Supine Unable to assess  (pt returned to chair upon conclusion of PT eval)   Transfers   Sit to Stand 3  Moderate assistance Additional items Assist x 1; Increased time required;Verbal cues   Stand to Sit 3  Moderate assistance   Additional items Assist x 1; Increased time required   Additional Comments all transfers performed with RW; put pillow on chair to increase seat height and assist with future transfers secondary to pt's height   Ambulation/Elevation   Gait pattern Short stride; Excessively slow  (NWB RLE, hop to with LLE)   Gait Assistance 4  Minimal assist   Additional items Assist x 1   Assistive Device Rolling walker   Distance 15'  (chair follow)   Stair Management Assistance Not tested   Balance   Static Sitting Good   Static Standing Fair   Ambulatory Poor +   Endurance Deficit   Endurance Deficit Yes   Endurance Deficit Description secondary to fatigue   Activity Tolerance   Activity Tolerance Patient limited by fatigue   Medical Staff Made Aware Jesus Check, PT; Titus OT   Nurse Made Aware pt appropriate to see and mobilize per nursing   Assessment   Prognosis Good   Problem List Decreased endurance; Impaired balance;Decreased mobility; Impaired hearing;Orthopedic restrictions;Pain   Assessment Pt is a 78 y o  male [facility] with primary dx of sepsis and congenital talipes equinovarus  He was originally evaluated by PT on 2/20/2021  He underwent AMPUTATION TRANSMETATARSAL (TMA), REMOVAL NAIL IM T2 ICF HARDWARE (Right) on 2/22/2021 and has NWB orders on RLE  PT was consulted to re-evaluate pt's functional mobility and discharge needs s/p transmetatarsal amputation  Upon evaluation, patient required S for bed mobility, ModAx1 for sit to stand transfers, and MinAx1 for ambulation with RW and hop to pattern  Pt was successful in maintaining his NWBS  He would benefit from transferring from elevated surfaces secondary to his height  Pt's functional impairments include: decreased balance, endurance, activity tolerance, and mobility and gait deviations secondary to being NWB RLE   At conclusion of eval, pt remained seated in chair with chair alarm, phone, call bell, and all other personal needs within reach  Pt would benefit from skilled PT to address his functional mobility deficits and promote his safety and QOL  D/C recommendations are rehab  Goals   Patient Goals to get better   STG Expiration Date 03/05/21   Short Term Goal #1 In 10 days, pt will: 1) perform bed mobility with mod I to promote functional mobility and decrease caregiver burden  2) perform transfers with Mod I while maintaining NWBS to improve functional mobility, safety, and QOL  3) ambulate 100' with Mod I and least restrictive device while maintaining NWBS to promote safe return to home environment  4) negotiate 1 curb step with Mod I and least restrictive device to improve ability to navigate community safely  5) improve balance grades by 1/2 to promote safety with functional mobility  6) improve strength grades by 1/2 to promote safety with functional mobility  Plan   Treatment/Interventions Functional transfer training;LE strengthening/ROM; Elevations; Therapeutic exercise; Endurance training;Patient/family training;Equipment eval/education; Bed mobility;Gait training;Spoke to nursing;OT   PT Frequency Other (Comment)  (3-6x/wk)   Recommendation   PT Discharge Recommendation Post-Acute Rehabilitation Services   Equipment Recommended Aly Varela; Other (Comment)  (RW, BSC)   Walker Package Recommended Wheeled walker   PT - OK to Discharge Yes  (to rehab when medically cleared)   [facility] [address] Mobility Inpatient   Turning in Bed Without Bedrails 3   Lying on Back to Sitting on Edge of Flat Bed 3   Moving Bed to Chair 2   Standing Up From Chair 2   Walk in Room 3   Climb 3-5 Stairs 2   Basic Mobility Inpatient Raw Score 15   Basic Mobility Standardized Score 36 97   Modified [place] Scale   Modified [place] Scale 4   [place]hel Index   Feeding 10   Bathing 5   Grooming Score 5   Dressing Score 5   Bladder Score 10   Bowels Score 10   Toilet Use Score 5   Transfers (Bed/Chair) Score 5   Mobility (Level Surface) Score 0   Stairs Score 0   Barthel Index Score 55   Sabas Russo, SPT

## 2021-02-24 LAB
ANION GAP SERPL CALCULATED.3IONS-SCNC: 7 MMOL/L (ref 4–13)
BUN SERPL-MCNC: 10 MG/DL (ref 5–25)
CALCIUM SERPL-MCNC: 8.7 MG/DL (ref 8.3–10.1)
CHLORIDE SERPL-SCNC: 107 MMOL/L (ref 100–108)
CO2 SERPL-SCNC: 25 MMOL/L (ref 21–32)
CREAT SERPL-MCNC: 1.03 MG/DL (ref 0.6–1.3)
ERYTHROCYTE [DISTWIDTH] IN BLOOD BY AUTOMATED COUNT: 14.4 % (ref 11.6–15.1)
GFR SERPL CREATININE-BSD FRML MDRD: 74 ML/MIN/1.73SQ M
GLUCOSE SERPL-MCNC: 144 MG/DL (ref 65–140)
GLUCOSE SERPL-MCNC: 161 MG/DL (ref 65–140)
GLUCOSE SERPL-MCNC: 247 MG/DL (ref 65–140)
GLUCOSE SERPL-MCNC: 250 MG/DL (ref 65–140)
GLUCOSE SERPL-MCNC: 299 MG/DL (ref 65–140)
HCT VFR BLD AUTO: 33.8 % (ref 36.5–49.3)
HGB BLD-MCNC: 10.9 G/DL (ref 12–17)
HIV 2 AB SERPL QL IA: NEGATIVE
HIV1 AB SERPL QL IA: NEGATIVE
MCH RBC QN AUTO: 30.4 PG (ref 26.8–34.3)
MCHC RBC AUTO-ENTMCNC: 32.2 G/DL (ref 31.4–37.4)
MCV RBC AUTO: 94 FL (ref 82–98)
PLATELET # BLD AUTO: 473 THOUSANDS/UL (ref 149–390)
PMV BLD AUTO: 10 FL (ref 8.9–12.7)
POTASSIUM SERPL-SCNC: 3.5 MMOL/L (ref 3.5–5.3)
RBC # BLD AUTO: 3.59 MILLION/UL (ref 3.88–5.62)
SL AMB NOTE:: NEGATIVE
SODIUM SERPL-SCNC: 139 MMOL/L (ref 136–145)
WBC # BLD AUTO: 10.07 THOUSAND/UL (ref 4.31–10.16)

## 2021-02-24 PROCEDURE — 82948 REAGENT STRIP/BLOOD GLUCOSE: CPT

## 2021-02-24 PROCEDURE — 85027 COMPLETE CBC AUTOMATED: CPT | Performed by: STUDENT IN AN ORGANIZED HEALTH CARE EDUCATION/TRAINING PROGRAM

## 2021-02-24 PROCEDURE — 80048 BASIC METABOLIC PNL TOTAL CA: CPT | Performed by: STUDENT IN AN ORGANIZED HEALTH CARE EDUCATION/TRAINING PROGRAM

## 2021-02-24 PROCEDURE — 99024 POSTOP FOLLOW-UP VISIT: CPT | Performed by: PODIATRIST

## 2021-02-24 PROCEDURE — 99223 1ST HOSP IP/OBS HIGH 75: CPT | Performed by: STUDENT IN AN ORGANIZED HEALTH CARE EDUCATION/TRAINING PROGRAM

## 2021-02-24 PROCEDURE — 99232 SBSQ HOSP IP/OBS MODERATE 35: CPT | Performed by: INTERNAL MEDICINE

## 2021-02-24 RX ORDER — DOXYCYCLINE HYCLATE 100 MG/1
100 CAPSULE ORAL EVERY 12 HOURS SCHEDULED
Status: DISCONTINUED | OUTPATIENT
Start: 2021-02-24 | End: 2021-03-05

## 2021-02-24 RX ORDER — AMOXICILLIN 500 MG/1
500 CAPSULE ORAL EVERY 8 HOURS SCHEDULED
Status: DISCONTINUED | OUTPATIENT
Start: 2021-02-24 | End: 2021-03-05

## 2021-02-24 RX ORDER — BISACODYL 10 MG
10 SUPPOSITORY, RECTAL RECTAL ONCE
Status: DISCONTINUED | OUTPATIENT
Start: 2021-02-24 | End: 2021-03-10 | Stop reason: HOSPADM

## 2021-02-24 RX ORDER — METOPROLOL TARTRATE 5 MG/5ML
5 INJECTION INTRAVENOUS ONCE
Status: COMPLETED | OUTPATIENT
Start: 2021-02-24 | End: 2021-02-24

## 2021-02-24 RX ADMIN — APIXABAN 5 MG: 5 TABLET, FILM COATED ORAL at 08:52

## 2021-02-24 RX ADMIN — METOPROLOL TARTRATE 100 MG: 50 TABLET, FILM COATED ORAL at 21:30

## 2021-02-24 RX ADMIN — AMOXICILLIN 500 MG: 500 CAPSULE ORAL at 21:30

## 2021-02-24 RX ADMIN — METOROPROLOL TARTRATE 5 MG: 5 INJECTION, SOLUTION INTRAVENOUS at 13:30

## 2021-02-24 RX ADMIN — POLYETHYLENE GLYCOL 3350 17 G: 17 POWDER, FOR SOLUTION ORAL at 21:30

## 2021-02-24 RX ADMIN — SENNOSIDES, DOCUSATE SODIUM 1 TABLET: 8.6; 5 TABLET ORAL at 21:31

## 2021-02-24 RX ADMIN — DOXYCYCLINE 100 MG: 100 CAPSULE ORAL at 14:36

## 2021-02-24 RX ADMIN — PIPERACILLIN AND TAZOBACTAM 4.5 G: 36; 4.5 INJECTION, POWDER, FOR SOLUTION INTRAVENOUS at 05:17

## 2021-02-24 RX ADMIN — METOPROLOL TARTRATE 100 MG: 50 TABLET, FILM COATED ORAL at 08:52

## 2021-02-24 RX ADMIN — INSULIN GLARGINE 15 UNITS: 100 INJECTION, SOLUTION SUBCUTANEOUS at 21:31

## 2021-02-24 RX ADMIN — APIXABAN 5 MG: 5 TABLET, FILM COATED ORAL at 17:43

## 2021-02-24 RX ADMIN — PIPERACILLIN AND TAZOBACTAM 4.5 G: 36; 4.5 INJECTION, POWDER, FOR SOLUTION INTRAVENOUS at 11:55

## 2021-02-24 RX ADMIN — DOXYCYCLINE 100 MG: 100 CAPSULE ORAL at 21:31

## 2021-02-24 RX ADMIN — ATORVASTATIN CALCIUM 40 MG: 40 TABLET, FILM COATED ORAL at 08:52

## 2021-02-24 RX ADMIN — LISINOPRIL 40 MG: 20 TABLET ORAL at 08:51

## 2021-02-24 RX ADMIN — METOROPROLOL TARTRATE 5 MG: 5 INJECTION, SOLUTION INTRAVENOUS at 17:43

## 2021-02-24 RX ADMIN — AMOXICILLIN 500 MG: 500 CAPSULE ORAL at 14:36

## 2021-02-24 NOTE — PROGRESS NOTES
Progress Note - Infectious Disease   Baron Britt  78 y o  male MRN: [id number]  Unit/Bed#: [facility] 624-01 Encounter: [id number]      Impression/Plan:  71-year-old male patient history of Type II diabetes, hypertension, hyperlipidemia, AFib, clubfoot status post reconstructive surgery on 2021, external fixator placement placement with complicated postop bleeding required extra sutures prior to discharge on 2021 from [facility]  Patient  presented to the hospital for 2 days history of  fever, chills, palpitation  2021  x-ray of the foot shows 4 in 5 tarsometatarsal joint infection, significant soft tissue swelling   Patient is status post external fixator removal, evacuation of large hematoma, fasciotomy for compartment syndrome on 2021  Noted Progressive Ischemic changes on Rt Big toe  Patient is S/P  TMA/ washout  2021  B Cx negative- Wound Cx Enterobacter Cloaca & E fecalis     1  Severe sepsis POA secondary to right foot infected hematoma associated with hardware infection  · Blood culture remains negative  · Wound culture grows Enterobacter cloaca, E faecalis  · On Zosyn per sensitivity results-D5  · status post curative TMA resection with hardware removal  · Remains afebrile, no leukocytosis  · Will switch to p o  Amoxicillin, Doxycycline to complete total 7 days post TMA  Through 2021  · Continue wound care-podiatry follow-up  2  Type 2 diabetes with A1c of 7 8%  Blood sugar control per primary team     3  Afib  Rate control, anticoagulation per primary team     4  False negative HIV screening  Neg PCR, P24 Ag  Subjective:  Patient has no fever, chills, sweats; no nausea, vomiting, diarrhea; no cough, shortness of breath; no pain  No new symptoms      Objective:  Vitals:  Temp:  [98 1 °F (36 7 °C)-99 9 °F (37 7 °C)] 98 9 °F (37 2 °C)  HR:  [] 97  Resp:  [14-18] 14  BP: (130-145)/(71-97) 136/97  SpO2:  [93 %-99 %] 99 %  Temp (24hrs), Av 9 °F (37 2 °C), Min:98 1 °F Stroke RF  SBP goal <180  Management per primary team   (36 7 °C), Max:99 9 °F (37 7 °C)  Current: Temperature: 98 9 °F (37 2 °C)    Physical Exam:   General Appearance:  Alert, interactive, nontoxic, no acute distress  Throat: Oropharynx moist without lesions  Lungs:   Clear to auscultation bilaterally; no wheezes, rhonchi or rales; respirations unlabored   Heart:  RRR; no murmur, rub or gallop   Abdomen:   Soft, non-tender, non-distended, positive bowel sounds  Extremities: Right foot dressing in place-post TMA resection  Skin: No new rashes or lesions  No draining wounds noted         Labs, Imaging, & Other studies:   All pertinent labs and imaging studies were personally reviewed  Results from last 7 days   Lab Units 02/24/21  0548 02/23/21  0510 02/22/21  0551   WBC Thousand/uL 10 07 11 26* 9 89   HEMOGLOBIN g/dL 10 9* 11 6* 11 1*   PLATELETS Thousands/uL 473* 456* 387     Results from last 7 days   Lab Units 02/24/21  0548 02/23/21  0510 02/22/21  0551   SODIUM mmol/L 139 144 142   POTASSIUM mmol/L 3 5 3 9 3 8   CHLORIDE mmol/L 107 111* 112*   CO2 mmol/L 25 28 24   BUN mg/dL 10 8 8   CREATININE mg/dL 1 03 0 91 0 82   EGFR ml/min/1 73sq m 74 86 91   CALCIUM mg/dL 8 7 8 8 8 8     Results from last 7 days   Lab Units 02/18/21  1328 02/17/21  1636 02/17/21  1634   GRAM STAIN RESULT   --  1+ Polys*  1+ Gram negative rods* 1+ Polys  No bacteria seen   WOUND CULTURE   --  3+ Growth of Enterobacter cloacae complex*  1+ Growth of  2+ Growth of Enterobacter cloacae complex*  1+ Growth of Enterococcus faecalis*  1+ Growth of    MRSA CULTURE ONLY  No Methicillin Resistant Staphlyococcus aureus (MRSA) isolated  --   --      Results from last 7 days   Lab Units 02/20/21  1000   PROCALCITONIN ng/ml 5 33*

## 2021-02-24 NOTE — PROGRESS NOTES
Podiatry - Progress Note  Patient: Eryn Urbina  78 y o  male   MRN: [id number]  PCP: Freida Esteves MD  Unit/Bed#: [facility] 293-61 Encounter: [id number]  Date Of Visit: 21    ASSESSMENT:    Eryn Urbina  is a 78 y o  male with:    1  Right foot hematoma, surgical site infection, ischemia  - S/p exfix removal, I&D, fasciotomy (DOS 21)  - s/p TMA of left foot   2  Congenital talipes equinovarus right foot             - s/p midfoot arthrodesis, exfix application (DOS )  3  T2Dm, last A1C 7 8 %  4  HTN  5  Altered mental status  6  Constipation  7  atrial fibrillation  8  Sepsis on admission - resolved      PLAN:    · Plan for bedside vac change  to determine healing potential and potential plastic surgery involvement for treatment options  Continued high risk of limb loss  · Dressing c/d/I, wound vac running at 125 low continuous with good output and without leaks or blockage  · Elevation on green foam wedges or pillows when non-ambulatory  · Rest of care per primary team      Weightbearing status: NWB RLE    SUBJECTIVE:     The patient was seen, evaluated, and assessed at bedside today  The patient was awake, alert, and in no acute distress  No acute events overnight  The patient reports no complaints today  Patient denies N/V/F/chills/SOB/CP  OBJECTIVE:     Vitals:   /93   Pulse 84   Temp 99 1 °F (37 3 °C)   Resp 14   Ht 6' 4" (1 93 m)   Wt 109 kg (240 lb 15 4 oz)   SpO2 98%   BMI 29 33 kg/m²     Temp (24hrs), Av 1 °F (37 3 °C), Min:98 2 °F (36 8 °C), Max:99 9 °F (37 7 °C)      Physical Exam:     General: Alert, cooperative and no distress  Lungs: Non labored breathing  Abdomen: Soft, non-tender  Lower extremity exam:  Cardiovascular status at baseline  Neurological status at baseline  Musculoskeletal status at baseline  No calf tenderness noted   Dressing c/d/I, wound vac running at 125 low continuous with good output and without leaks or blockage  Additional Data:     Labs:    Results from last 7 days   Lab Units 02/24/21  0548   WBC Thousand/uL 10 07   HEMOGLOBIN g/dL 10 9*   HEMATOCRIT % 33 8*   PLATELETS Thousands/uL 473*     Results from last 7 days   Lab Units 02/24/21  0548   POTASSIUM mmol/L 3 5   CHLORIDE mmol/L 107   CO2 mmol/L 25   BUN mg/dL 10   CREATININE mg/dL 1 03   CALCIUM mg/dL 8 7     Results from last 7 days   Lab Units 02/18/21  0521   INR  1 36*       * I Have Reviewed All Lab Data Listed Above  Recent Cultures (last 7 days):     Results from last 7 days   Lab Units 02/17/21  1636 02/17/21  1634   GRAM STAIN RESULT  1+ Polys*  1+ Gram negative rods* 1+ Polys  No bacteria seen   WOUND CULTURE  3+ Growth of Enterobacter cloacae complex*  1+ Growth of  2+ Growth of Enterobacter cloacae complex*  1+ Growth of Enterococcus faecalis*  1+ Growth of      Results from last 7 days   Lab Units 02/17/21  1636 02/17/21  1634   ANAEROBIC CULTURE  No anaerobes isolated No anaerobes isolated       Imaging: I have personally reviewed pertinent films in PACS  EKG, Pathology, and Other Studies: I have personally reviewed pertinent reports  ** Please Note: Portions of the record may have been created with voice recognition software  Occasional wrong word or "sound a like" substitutions may have occurred due to the inherent limitations of voice recognition software  Read the chart carefully and recognize, using context, where substitutions have occurred   **

## 2021-02-24 NOTE — PLAN OF CARE
Problem: Potential for Falls  Goal: Patient will remain free of falls  Description: INTERVENTIONS:  - Assess patient frequently for physical needs  -  Identify cognitive and physical deficits and behaviors that affect risk of falls    -  [place] fall precautions as indicated by assessment   - Educate patient/family on patient safety including physical limitations  - Instruct patient to call for assistance with activity based on assessment  - Modify environment to reduce risk of injury  - Consider OT/PT consult to assist with strengthening/mobility  Outcome: Progressing     Problem: Prexisting or High Potential for Compromised Skin Integrity  Goal: Skin integrity is maintained or improved  Description: INTERVENTIONS:  - Identify patients at risk for skin breakdown  - Assess and monitor skin integrity  - Assess and monitor nutrition and hydration status  - Monitor labs   - Assess for incontinence   - Turn and reposition patient  - Assist with mobility/ambulation  - Relieve pressure over bony prominences  - Avoid friction and shearing  - Provide appropriate hygiene as needed including keeping skin clean and dry  - Evaluate need for skin moisturizer/barrier cream  - Collaborate with interdisciplinary team   - Patient/family teaching  - Consider wound care consult   Outcome: Progressing     Problem: PAIN - ADULT  Goal: Verbalizes/displays adequate comfort level or baseline comfort level  Description: Interventions:  - Encourage patient to monitor pain and request assistance  - Assess pain using appropriate pain scale  - Administer analgesics based on type and severity of pain and evaluate response  - Implement non-pharmacological measures as appropriate and evaluate response  - Consider cultural and social influences on pain and pain management  - Notify physician/advanced practitioner if interventions unsuccessful or patient reports new pain  Outcome: Progressing     Problem: INFECTION - ADULT  Goal: Absence or prevention of progression during hospitalization  Description: INTERVENTIONS:  - Assess and monitor for signs and symptoms of infection  - Monitor lab/diagnostic results  - Monitor all insertion sites, i e  indwelling lines, tubes, and drains  - Monitor endotracheal if appropriate and nasal secretions for changes in amount and color  - [place] appropriate cooling/warming therapies per order  - Administer medications as ordered  - Instruct and encourage patient and family to use good hand hygiene technique  - Identify and instruct in appropriate isolation precautions for identified infection/condition  Outcome: Progressing     Problem: SAFETY ADULT  Goal: Patient will remain free of falls  Description: INTERVENTIONS:  - Assess patient frequently for physical needs  -  Identify cognitive and physical deficits and behaviors that affect risk of falls    -  [place] fall precautions as indicated by assessment   - Educate patient/family on patient safety including physical limitations  - Instruct patient to call for assistance with activity based on assessment  - Modify environment to reduce risk of injury  - Consider OT/PT consult to assist with strengthening/mobility  Outcome: Progressing  Goal: Maintain or return to baseline ADL function  Description: INTERVENTIONS:  -  Assess patient's ability to carry out ADLs; assess patient's baseline for ADL function and identify physical deficits which impact ability to perform ADLs (bathing, care of mouth/teeth, toileting, grooming, dressing, etc )  - Assess/evaluate cause of self-care deficits   - Assess range of motion  - Assess patient's mobility; develop plan if impaired  - Assess patient's need for assistive devices and provide as appropriate  - Encourage maximum independence but intervene and supervise when necessary  - Involve family in performance of ADLs  - Assess for home care needs following discharge   - Consider OT consult to assist with ADL evaluation and planning for discharge  - Provide patient education as appropriate  Outcome: Progressing  Goal: Maintain or return mobility status to optimal level  Description: INTERVENTIONS:  - Assess patient's baseline mobility status (ambulation, transfers, stairs, etc )    - Identify cognitive and physical deficits and behaviors that affect mobility  - Identify mobility aids required to assist with transfers and/or ambulation (gait belt, sit-to-stand, lift, walker, cane, etc )  - [place] fall precautions as indicated by assessment  - Record patient progress and toleration of activity level on Mobility SBAR; progress patient to next Phase/Stage  - Instruct patient to call for assistance with activity based on assessment  - Consider rehabilitation consult to assist with strengthening/weightbearing, etc   Outcome: Progressing     Problem: SAFETY ADULT  Goal: Patient will remain free of falls  Description: INTERVENTIONS:  - Assess patient frequently for physical needs  -  Identify cognitive and physical deficits and behaviors that affect risk of falls    -  [place] fall precautions as indicated by assessment   - Educate patient/family on patient safety including physical limitations  - Instruct patient to call for assistance with activity based on assessment  - Modify environment to reduce risk of injury  - Consider OT/PT consult to assist with strengthening/mobility  Outcome: Progressing  Goal: Maintain or return to baseline ADL function  Description: INTERVENTIONS:  -  Assess patient's ability to carry out ADLs; assess patient's baseline for ADL function and identify physical deficits which impact ability to perform ADLs (bathing, care of mouth/teeth, toileting, grooming, dressing, etc )  - Assess/evaluate cause of self-care deficits   - Assess range of motion  - Assess patient's mobility; develop plan if impaired  - Assess patient's need for assistive devices and provide as appropriate  - Encourage maximum independence but intervene and supervise when necessary  - Involve family in performance of ADLs  - Assess for home care needs following discharge   - Consider OT consult to assist with ADL evaluation and planning for discharge  - Provide patient education as appropriate  Outcome: Progressing  Goal: Maintain or return mobility status to optimal level  Description: INTERVENTIONS:  - Assess patient's baseline mobility status (ambulation, transfers, stairs, etc )    - Identify cognitive and physical deficits and behaviors that affect mobility  - Identify mobility aids required to assist with transfers and/or ambulation (gait belt, sit-to-stand, lift, walker, cane, etc )  - [place] fall precautions as indicated by assessment  - Record patient progress and toleration of activity level on Mobility SBAR; progress patient to next Phase/Stage  - Instruct patient to call for assistance with activity based on assessment  - Consider rehabilitation consult to assist with strengthening/weightbearing, etc   Outcome: Progressing

## 2021-02-24 NOTE — ASSESSMENT & PLAN NOTE
On sitagliptin, metformin, and glimepiride outpatient: all held on admission  Not previously on insulin  Hyperglycemia noted at night    Continue to monitor, likely elevated in the setting of acute illness  Blood sugars stable for 48 hours  -continue Lantus to 15 units q h s  and SSI  Goal blood glucose 180-220

## 2021-02-24 NOTE — PROGRESS NOTES
Progress Note - Shara Ward  78 y o  male MRN: [id number]    Unit/Bed#: [facility] 624-01 Encounter: [id number]      Assessment:  Atrial fibrillation with RVR [facility])  Assessment & Plan  Patient has longstanding history of atrial fibrillation on Cardizem 120 BID for rate control and Eliquis for anticoagulation  Does not follow with cardiology outpatient  - Continue eliquis   - no indication to trend INR unless useful to surgical team  -currently rate controlled, 1 episode of AFib with RVR yesterday, responded well to 5 mg Lopressor  He had only received 1 dose of 50 mg metoprolol which was delayed due to procedure   -continue metoprolol to 50 mg q 12 for now, consider increasing if he remains uncontrolled  - metoprolol 5mg IV q6 PRN for HR >120  -  monitor on telemetry    Type 2 diabetes mellitus with diabetic neuropathy ([facility] [address] )  Assessment & Plan  On sitagliptin, metformin, and glimepiride outpatient: all held on admission  Not previously on insulin  Some hyperglycemia noted 2/23 PM, fasting blood sugar improved to 140s 2/24 AM  -continue Lantus to 15 units q h s  and SSI    Goal blood glucose 140-180  Continue to monitor, likely elevated in the setting of acute illness    Hyperlipidemia  Assessment & Plan  Stable  Continue home atorvastatin 40mg    * Sepsis ([facility] [address] )  Assessment & Plan  Due to infected hardware/hematoma of R foot, now status post removal and washout, POD#6  Underwent TMA yesterday postop day 2  - management of right foot as per surgery  - blood cultures negative x 72 hours  - ID consulted, appreciated  -Transition to PO antibiotics for 7 day postoperative from TMA per ID        Subjective:   Patient seen and examined, no acute events overnight  Pain is well controlled, appetite is improving and he is starting to have bowel movements  He is having difficulty with initiating urination, but he is encouraged to increase PO fluid intake, since IV fluids were recently discontinued   Denies nausea, vomiting, diarrhea, chest pain, shortness of breath, abdominal pain, pain with urination, fever, chills  Objective:     Vitals: Blood pressure 134/93, pulse 84, temperature 99 1 °F (37 3 °C), resp  rate 14, height 6' 4" (1 93 m), weight 109 kg (240 lb 15 4 oz), SpO2 98 %  ,Body mass index is 29 33 kg/m²  Intake/Output Summary (Last 24 hours) at 2/24/2021 1216  Last data filed at 2/24/2021 0248  Gross per 24 hour   Intake 100 ml   Output 500 ml   Net -400 ml       Physical Exam: /93   Pulse 84   Temp 99 1 °F (37 3 °C)   Resp 14   Ht 6' 4" (1 93 m)   Wt 109 kg (240 lb 15 4 oz)   SpO2 98%   BMI 29 33 kg/m²   General appearance: alert and oriented, in no acute distress  Lungs: clear to auscultation bilaterally  Heart: irregularly irregular rhythm  Abdomen: soft, non-tender; bowel sounds normal; no masses,  no organomegaly     Invasive Devices     Peripheral Intravenous Line            Peripheral IV 02/22/21 Right Antecubital 1 day          Drain            Negative Pressure Wound Therapy (V A C ) Foot less than 1 day                Lab, Imaging and other studies: I have personally reviewed pertinent reports      VTE Pharmacologic Prophylaxis: RX contraindicated due to: Eliquis for Afib  VTE Mechanical Prophylaxis: sequential compression device

## 2021-02-24 NOTE — CASE MANAGEMENT
S/w pt & TC to wife in pt's room to discuss therapy recommendations for IPR  Both requested referral to [facility]  Referral sent  They asked for PM&R consult tbd  Notified podiatry & entered  List of SNF back up choices provided  A post acute care recommendation was made by your care team for STR  Discussed Freedom of Choice with both patient and caregiver  List of facilities given to both patient and caregiver via in person  both patient and caregiver aware the list is custom filtered for them by preference  and that [facility] post acute providers are designated

## 2021-02-24 NOTE — ASSESSMENT & PLAN NOTE
Patient has longstanding history of atrial fibrillation on Cardizem 120 BID for rate control and Eliquis for anticoagulation   Does not follow with cardiology outpatient  - Continue eliquis  -continue metoprolol 100 mg q 12   -metoprolol 5mg IV q6 PRN for HR >120  -monitor on telemetry  -hemodynamically stable for more than 48 hours, medicine will sign off, please contact with any other questions

## 2021-02-24 NOTE — ASSESSMENT & PLAN NOTE
Due to infected hardware/hematoma of R foot, now status post removal and washout, POD#7  Underwent TMA yesterday postop day 3  - management of right foot as per surgery  - blood cultures negative  - ID consulted, appreciated  - Ampicillin and doxy PO, course ends 3/1, management per Infectious Disease and Podiatry  Patient appears hemodynamically stable for more than 48 hours, with all medical needs addressed  Medicine will sign off, please contact with any questions    Thank you

## 2021-02-24 NOTE — CONSULTS
PHYSICAL MEDICINE AND REHABILITATION CONSULT NOTE  Krista England  78 y o  male MRN: [id number]  Unit/Bed#: [facility] 624-01 Encounter: [id number]    Requested by (Physician/Service): Angela Santos DPM  Reason for Consultation:  Assessment of rehabilitation needs    Assessment:  Rehabilitation Diagnosis:    Transmetatarsal Amputation of the right foot   Impaired mobility and self care   Impaired cognition     Recommendations:  Rehabilitation Plan:   Continue PT/OT (SLP) while on acute care   Mr Celeste Barroso would benefit from acute inpatient rehabilitation  He has ongoing medical needs requiring physician over sight along with a good disposition and the ability to tolerate 3hrs of therapy/day  It would need to be first decided whether or not he will need any additional surgeries not limited to debridements or escalation to a transtibial amputation  We will continue to follow   Covid-19 Testing: [facility] rehabilitation units require testing within 48 hours of potential admission for all general admissions  Please re-test if needed  *Re-testing is NOT required for patients recovering from COVID-19 infection if isolation has been discontinued per CDC criteria  Medical Co-morbidities Plan:  Severe Sepsis due to infected R foot hardware with hematoma s/p Washout and Transmetatarsal amputation  Post operative bleeding complicated by hematoma and compartment syndrome  Congenital talipes equinovarus   Atrial fibrillation with RVR  Type 2 Diabetes  Hyperlipidemia  Anemia  Bowel plan: Last BM 2/24, several loose stools  Bladder plan: continent  DVT ppx: eliquis      Thank you for this consultation  Do not hesitate to contact service with further questions        Shania Loco DO  PM&R    History of Present Illness:  59-year-old male patient with history of diabetes mellitus type 2, atrial fibrillation, right clubfoot, who underwent on 02/12/2021 right foot reconstructive surgery with fusion of the midfoot, and external fixator application  Immediate postop course was complicated by bleeding requiring suturing  Patient reports that on 02/14/2021 he started having fever, chills, he started noticing change in color of the right foot particularly over the hallux, with increase in pain, swelling  Upon admission on 02/16/2021 patient febrile with T-max 103°, with leukocytosis WBC 27635, mild lactic acidosis  An x-ray of the foot showed possible infection involving 4th and 5th metatarsal joints  Patient was evaluated by Podiatry team, he was taken to operation room 02/17/2021 where the external fixator was removed, a large hematoma causing compartment syndrome was evacuated, fasciotomy was done  A small amount of purulence was noted over lateral aspect of the right foot  No signs of infection or purulence was noted close to or communicating with the right hallux hardware  He once again went to the OR on 2/22 for a right transmetatarsal amputation  His status is tenuous per Podiatry with possibility of future TTA  He remains NWB on the right foot with wound VAC  He denies any pain mostly because of pre-existing neuropathy, however will have sharp pains that subside quickly up to a 6/10 in the right foot and leg up to the knee  He denies fever, chills, nausea, emesis, diarrhea,a or constipation  Review of Systems: 10 point ROS negative except for what is noted in HPI    Function:  Prior level of function and living situation:  Lives in a double wide trailer with wife  Wheelchair ramp in place  Patient has a donated wheelchair at home, but he is 9'9'' and does not fit into it   He is retired currently and previously independent prior to initial foot surgery    Current level of function:  Physical Therapy: bed mobility supervision, transfers Mod A, Ambulation Min A RW 15 feet  Occupational Therapy: eating independent, grooming supervision, UB ADLs Min A, LB ADLs Mod A, Toileting Mod A    Physical Exam:  /93   Pulse 84 Temp 99 1 °F (37 3 °C)   Resp 14   Ht 6' 4" (1 93 m)   Wt 109 kg (240 lb 15 4 oz)   SpO2 98%   BMI 29 33 kg/m²        Intake/Output Summary (Last 24 hours) at 2/24/2021 1302  Last data filed at 2/24/2021 0248  Gross per 24 hour   Intake 100 ml   Output 500 ml   Net -400 ml       Body mass index is 29 33 kg/m²  Physical Exam  Constitutional:       General: He is not in acute distress  Appearance: Normal appearance  HENT:      Head: Normocephalic and atraumatic  Nose: No rhinorrhea  Mouth/Throat:      Mouth: Mucous membranes are moist    Eyes:      Extraocular Movements: Extraocular movements intact  Neck:      Musculoskeletal: Normal range of motion  Cardiovascular:      Rate and Rhythm: Normal rate  Pulses: Normal pulses  Pulmonary:      Effort: Pulmonary effort is normal  No respiratory distress  Breath sounds: No wheezing or rales  Abdominal:      General: There is no distension  Palpations: Abdomen is soft  Musculoskeletal:      Right lower leg: No edema  Left lower leg: No edema  Comments: Left foot deformity with limited ROM from corrected birth defect  Right foot wrapped in kerlex with wound vac in place  Skin:     General: Skin is warm  Neurological:      Mental Status: He is alert and oriented to person, place, and time  Motor: No weakness  Comments: No sensation in feet with gradual return of sensation to light touch approximately 1/3 up the distal of the tibia     Psychiatric:         Mood and Affect: Mood normal          Behavior: Behavior normal           Social History:    Social History     Socioeconomic History    Marital status: /Civil Union     Spouse name: None    Number of children: None    Years of education: None    Highest education level: None   Occupational History    None   Social Needs    Financial resource strain: None    Food insecurity     Worry: None     Inability: None    Transportation needs Medical: None     Non-medical: None   Tobacco Use    Smoking status: Former Smoker     Types: Cigarettes     Quit date: 1988     Years since quittin 3    Smokeless tobacco: Former User   Substance and Sexual Activity    Alcohol use: Yes     Frequency: 2-4 times a month     Drinks per session: 1 or 2     Comment: socially    Drug use: Not Currently    Sexual activity: Yes   Lifestyle    Physical activity     Days per week: None     Minutes per session: None    Stress: None   Relationships    Social connections     Talks on phone: None     Gets together: None     Attends Alevism service: None     Active member of club or organization: None     Attends meetings of clubs or organizations: None     Relationship status: None    Intimate partner violence     Fear of current or ex partner: None     Emotionally abused: None     Physically abused: None     Forced sexual activity: None   Other Topics Concern    None   Social History Narrative    None        Family History:    Family History   Problem Relation Age of Onset    Diabetes Father         mellitus         Medications:     Current Facility-Administered Medications:     acetaminophen (TYLENOL) tablet 650 mg, 650 mg, Oral, Q6H PRN, Ramiro Pickering, DPM, 650 mg at 21 1326    amoxicillin (AMOXIL) capsule 500 mg, 500 mg, Oral, Q8H [facility], Charles Fish MD    apixaban (ELIQUIS) tablet 5 mg, 5 mg, Oral, BID, FARZANEH CarmonaM, 5 mg at 21 0852    atorvastatin (LIPITOR) tablet 40 mg, 40 mg, Oral, Daily, Ramiro Pickering DPM, 40 mg at 21 0105    doxycycline hyclate (VIBRAMYCIN) capsule 100 mg, 100 mg, Oral, Q12H [facility], Charles Fish MD    HYDROmorphone (DILAUDID) injection 0 5 mg, 0 5 mg, Intravenous, Q4H PRN, Ramiro Pickering DPM    insulin glargine (LANTUS) subcutaneous injection 15 Units 0 15 mL, 15 Units, Subcutaneous, HS, Wanda Torres DPM, 15 Units at 21 2206    insulin lispro (HumaLOG) 100 units/mL subcutaneous injection 1-6 Units, 1-6 Units, Subcutaneous, 4x Daily (AC & HS), 3 Units at 02/24/21 1157 **AND** Fingerstick Glucose (POCT), , , 4x Daily AC and at bedtime, Gwendolynn Meredith, DPM    lisinopril (ZESTRIL) tablet 40 mg, 40 mg, Oral, Daily, Gwendolynn Meredith, DPM, 40 mg at 02/24/21 0851    metoprolol (LOPRESSOR) injection 5 mg, 5 mg, Intravenous, Q6H PRN, Gwendolynn Meredith, DPM, 5 mg at 02/23/21 1455    metoprolol tartrate (LOPRESSOR) tablet 100 mg, 100 mg, Oral, Q12H [facility], [facility], DO, 100 mg at 02/24/21 9421    oxyCODONE (ROXICODONE) immediate release tablet 10 mg, 10 mg, Oral, Q4H PRN, Gwendolynn Meredith, DPM, 10 mg at 02/22/21 1946    oxyCODONE (ROXICODONE) IR tablet 5 mg, 5 mg, Oral, Q4H PRN, Gwendolynn Meredith, DPM    polyethylene glycol (MIRALAX) packet 17 g, 17 g, Oral, Daily PRN, Gwendolynn Meredith, DPM    senna-docusate sodium (SENOKOT S) 8 6-50 mg per tablet 1 tablet, 1 tablet, Oral, HS, Gwendolynn Meredith, DPM, 1 tablet at 02/23/21 2206    Past Medical History:     Past Medical History:   Diagnosis Date    Arthritis     Atrial fibrillation ([address] )     Diagnosed 11/2018    Benign prostate hyperplasia 04/2002    Cellulitis     right lower leg     Colon polyp 2006    Diabetes mellitus ([address] )     Hearing aid worn     bilat    Hearing loss     90% loss left ear and 40% right ear    History of clubfoot     "since birth"    History of pneumonia     History of TIA (transient ischemic attack) 04/25/2017 11/30/18 pt denies    Hyperlipidemia 5/15/2014    Hypertension     Infectious viral hepatitis     "cant remember type"    Irregular heart beat     Neuropathy     both feet    Osteomyelitis ([address] )     right great toe    Right middle lobe pulmonary nodule 11/6/2018    Seasickness     Teeth missing     Type 2 diabetes mellitus with diabetic neuropathy ([address] ) 11/6/2018    Wears glasses     reading    Wound, open     bottom of right foot        Past Surgical History:     Past Surgical History:   Procedure Laterality Date    BUNIONECTOMY Right 12/4/2018    Procedure: 5TH METATARSAL BONE PARTIAL RESECTION, FULL THICKNESS DEBRIDEMENT OF DIABETIC ULCER;  Surgeon: Madelyn Owens DPM;  [facility];  Service: Podiatry    CLUB FOOT RELEASE Bilateral     COLONOSCOPY      EXTERNAL FIXATOR APPLICATION Right 7/05/2343    Procedure: Application multiplane external fixation;  Surgeon: Samuel Thompson DPM;  [facility];  Service: Podiatry    FOOT HARDWARE REMOVAL Right 2/17/2021    Procedure: REMOVAL EXTERNAL FIXATOR;  Surgeon: Samuel Thompson DPM;  [facility];  Service: Podiatry    INCISION AND DRAINAGE OF WOUND Right 2/17/2021    Procedure: INCISION AND DRAINAGE (I&D) EXTREMITY;  Surgeon: Samuel Thompson DPM;  [facility];  Service: Podiatry    [facility] Right 2/22/2021    Procedure: AMPUTATION TRANSMETATARSAL (TMA), REMOVAL NAIL IM T2 ICF HARDWARE;  Surgeon: Samuel Thompson DPM;  [facility];  Service: Podiatry    OK FUSION FOOT BONES,MIDTARSAL,OSTEOTMY Right 2/12/2021    Procedure: foot ARTHRODESIS/FUSION;  Surgeon: Samuel Thompson DPM;  [facility];  Service: Podiatry    OK LENGTH/SHORT LEG/ANKL TENDON,SINGLE Right 2/12/2021    Procedure: LENGTHEN TIBIAL TENDON, TRANS HEEL CORD;  Surgeon: Samuel Thompson DPM;  [facility];  Service: Podiatry    OK PART [address] BONE METATARSAL HEAD,EA Right 12/29/2020    Procedure: EXCISION EXOSTOSIS;  Surgeon: Samuel Thompson DPM;  [facility];  Service: Podiatry    OK SECD CLOS SURG WND EXTEN/COMPLIC Right 02/98/4900    Procedure: PRIMARY DELAYED CLOSURE;  Surgeon: Samuel Thompson DPM;  [facility];  Service: Podiatry    TOE AMPUTATION Right     partial great toe    VASECTOMY  1992         Allergies: Allergies   Allergen Reactions    Simvastatin Myalgia    Itraconazole Other (See Comments)     Pt denies knowledge of allergy  Sidney Manifold             LABORATORY RESULTS:      Lab Results   Component Value Date    HGB 10 9 (L) 02/24/2021    HGB 14 5 05/22/2018    HCT 33 8 (L) 02/24/2021    HCT 44 1 05/22/2018    WBC 10 07 02/24/2021    WBC 7 4 05/22/2018     Lab Results   Component Value Date    BUN 10 02/24/2021    BUN 20 05/22/2018     05/22/2018    K 3 5 02/24/2021    K 5 1 (H) 05/22/2018     02/24/2021     05/22/2018    GLUCOSE 110 (H) 05/22/2018    CREATININE 1 03 02/24/2021     Lab Results   Component Value Date    PROTIME 16 7 (H) 02/18/2021    INR 1 36 (H) 02/18/2021        DIAGNOSTIC STUDIES: Reviewed  Xr Foot 2 Vw Right    Result Date: 2/17/2021  Impression: Findings suspicious for infection involving the 4th and 5th tarsometatarsal joints, less conspicuous previously  Status post surgery Workstation performed: [id number]     Xr Foot Right 3+ Views    Result Date: 2/18/2021  Impression: Removal of the external fixation device  Otherwise stable alignment of the post surgical changes   Workstation performed: [id number]     Gela Becker,   Physical Medicine and [place]

## 2021-02-25 PROBLEM — K59.00 CONSTIPATION: Status: ACTIVE | Noted: 2021-02-25

## 2021-02-25 PROBLEM — R35.0 URINARY FREQUENCY: Status: RESOLVED | Noted: 2021-02-25 | Resolved: 2021-02-25

## 2021-02-25 PROBLEM — R35.0 URINARY FREQUENCY: Status: ACTIVE | Noted: 2021-02-25

## 2021-02-25 LAB
GLUCOSE SERPL-MCNC: 175 MG/DL (ref 65–140)
GLUCOSE SERPL-MCNC: 176 MG/DL (ref 65–140)
GLUCOSE SERPL-MCNC: 238 MG/DL (ref 65–140)
GLUCOSE SERPL-MCNC: 240 MG/DL (ref 65–140)
GLUCOSE SERPL-MCNC: 260 MG/DL (ref 65–140)

## 2021-02-25 PROCEDURE — NC001 PR NO CHARGE: Performed by: PODIATRIST

## 2021-02-25 PROCEDURE — 99232 SBSQ HOSP IP/OBS MODERATE 35: CPT | Performed by: INTERNAL MEDICINE

## 2021-02-25 PROCEDURE — 97116 GAIT TRAINING THERAPY: CPT

## 2021-02-25 PROCEDURE — 82948 REAGENT STRIP/BLOOD GLUCOSE: CPT

## 2021-02-25 RX ADMIN — LISINOPRIL 40 MG: 20 TABLET ORAL at 08:46

## 2021-02-25 RX ADMIN — ATORVASTATIN CALCIUM 40 MG: 40 TABLET, FILM COATED ORAL at 08:46

## 2021-02-25 RX ADMIN — SENNOSIDES, DOCUSATE SODIUM 1 TABLET: 8.6; 5 TABLET ORAL at 20:43

## 2021-02-25 RX ADMIN — APIXABAN 5 MG: 5 TABLET, FILM COATED ORAL at 17:45

## 2021-02-25 RX ADMIN — INSULIN GLARGINE 15 UNITS: 100 INJECTION, SOLUTION SUBCUTANEOUS at 21:52

## 2021-02-25 RX ADMIN — AMOXICILLIN 500 MG: 500 CAPSULE ORAL at 21:52

## 2021-02-25 RX ADMIN — METOPROLOL TARTRATE 100 MG: 50 TABLET, FILM COATED ORAL at 20:44

## 2021-02-25 RX ADMIN — AMOXICILLIN 500 MG: 500 CAPSULE ORAL at 05:24

## 2021-02-25 RX ADMIN — APIXABAN 5 MG: 5 TABLET, FILM COATED ORAL at 08:46

## 2021-02-25 RX ADMIN — AMOXICILLIN 500 MG: 500 CAPSULE ORAL at 14:11

## 2021-02-25 RX ADMIN — METOPROLOL TARTRATE 100 MG: 50 TABLET, FILM COATED ORAL at 08:45

## 2021-02-25 RX ADMIN — DOXYCYCLINE 100 MG: 100 CAPSULE ORAL at 08:46

## 2021-02-25 RX ADMIN — DOXYCYCLINE 100 MG: 100 CAPSULE ORAL at 20:43

## 2021-02-25 NOTE — PROGRESS NOTES
Progress Note - Infectious Disease   Shara Ward  78 y o  male MRN: [id number]  Unit/Bed#: [facility] 624-01 Encounter: [id number]      Impression/Plan:  73-year-old male patient history of Type II diabetes, hypertension, hyperlipidemia, AFib, clubfoot status post reconstructive surgery on 2021, external fixator placement placement with complicated postop bleeding required extra sutures prior to discharge on 2021 from [facility]  Patient  presented to the hospital for 2 days history of  fever, chills, palpitation  2021  x-ray of the foot shows 4 in 5 tarsometatarsal joint infection, significant soft tissue swelling   Patient is status post external fixator removal, evacuation of large hematoma, fasciotomy for compartment syndrome on 2021  Noted Progressive Ischemic changes on Rt Big toe  Patient is S/P  TMA/ washout   & Hardware removal   B Cx negative- Wound Cx Enterobacter Cloaca & E fecalis      1  Severe sepsis POA secondary to right foot infected hematoma associated with hardware infection  · Blood culture -  negative  · Wound culture grows Enterobacter cloaca, E faecalis  · Was On Zosyn per sensitivity results  · status post curative TMA resection with hardware removal  · Remains afebrile, no leukocytosis  · Continue p o  Amoxicillin, Doxycycline to complete total 7 days post TMA  Through 2021  · Continue wound care  · podiatry  VAC changed today to assess for granulation and decision regarding local care versus Plastic surgery free flap    Antibiotics:  P o  Amoxicillin, doxy    Subjective:  Patient has no fever, chills, sweats; no nausea, vomiting, diarrhea; no cough, shortness of breath; no pain  No new symptoms      Objective:  Vitals:  Temp:  [97 7 °F (36 5 °C)-99 1 °F (37 3 °C)] 97 9 °F (36 6 °C)  HR:  [] 69  Resp:  [14-17] 14  BP: ()/(73-93) 151/91  SpO2:  [91 %-98 %] 97 %  Temp (24hrs), Av 3 °F (36 8 °C), Min:97 7 °F (36 5 °C), Max:99 1 °F (37 3 °C)  Current: Temperature: 97 9 °F (36 6 °C)    Physical Exam:   General Appearance:  Alert, interactive, nontoxic, no acute distress  Throat: Oropharynx moist without lesions  Lungs:   Clear to auscultation bilaterally; no wheezes, rhonchi or rales; respirations unlabored   Heart:  RRR; no murmur, rub or gallop   Abdomen:   Soft, non-tender, non-distended, positive bowel sounds  Extremities: Right wound VAC in place,   Skin: No new rashes or lesions  No draining wounds noted         Labs, Imaging, & Other studies:   All pertinent labs and imaging studies were personally reviewed  Results from last 7 days   Lab Units 02/24/21  0548 02/23/21  0510 02/22/21  0551   WBC Thousand/uL 10 07 11 26* 9 89   HEMOGLOBIN g/dL 10 9* 11 6* 11 1*   PLATELETS Thousands/uL 473* 456* 387     Results from last 7 days   Lab Units 02/24/21  0548 02/23/21  0510 02/22/21  0551   SODIUM mmol/L 139 144 142   POTASSIUM mmol/L 3 5 3 9 3 8   CHLORIDE mmol/L 107 111* 112*   CO2 mmol/L 25 28 24   BUN mg/dL 10 8 8   CREATININE mg/dL 1 03 0 91 0 82   EGFR ml/min/1 73sq m 74 86 91   CALCIUM mg/dL 8 7 8 8 8 8     Results from last 7 days   Lab Units 02/18/21  1328   MRSA CULTURE ONLY  No Methicillin Resistant Staphlyococcus aureus (MRSA) isolated     Results from last 7 days   Lab Units 02/20/21  1000   PROCALCITONIN ng/ml 5 33*

## 2021-02-25 NOTE — PLAN OF CARE
Problem: PHYSICAL THERAPY ADULT  Goal: Performs mobility at highest level of function for planned discharge setting  See evaluation for individualized goals  Description: Treatment/Interventions: Functional transfer training, LE strengthening/ROM, Elevations, Therapeutic exercise, Endurance training, Bed mobility, Gait training, OT  Equipment Recommended: Walker(at this time)       See flowsheet documentation for full assessment, interventions and recommendations  Outcome: Progressing  Note: Prognosis: Good  Problem List: Decreased endurance, Impaired balance, Decreased mobility, Impaired hearing, Orthopedic restrictions, Pain  Assessment: Pt continues to require increased assist to perform sit to stand transfers due to weight bearing status, weakness & mechanical disadvantages related to pt's height  Once standing, pt able to ambualte short distances while maintaining NWB status  Distances limited by tachycardia for safety  pt does not report chest pain, dyspnea, or lightheadedness with activity despite unsteady vitals  Pillow placed on pt's seat at conclusion of session to hopefully reduce requisite effort to stand  Discussed discharge plan & goal to maximize independence in safe environment prior to returning home  Pt verbalized understanding & ofered no further questions or concerns at this time  Barriers to Discharge: Inaccessible home environment  Barriers to Discharge Comments: At conclusion of PT session pt returned BTB with phone and call bell within reach  Pt denies any further questions at this time  The patient's AM-PAC Basic Mobility Inpatient Short Form Raw Score is 14, Standardized Score is 35 55  A standardized score less than 42 9 suggests the patient may benefit from discharge to post-acute rehabilitation services  However, anticipate pt will progress quickly to allow for d/c home as he has good social support and an accessible home   Please also refer to the recommendation of the Physical Therapist for safe discharge planning  Recommending home with social support and HHPT upon d/c    PT Discharge Recommendation: Post-Acute Rehabilitation Services     PT - OK to Discharge: Yes(to rehab when medically cleared)    See flowsheet documentation for full assessment

## 2021-02-25 NOTE — ASSESSMENT & PLAN NOTE
Patient has a history of BPH and has intermittent urinary symptoms which resolve on their own  For the past few days he is having difficulty initiating a strong stream of urine    He is having adequate urine output     - Continue to monitor  - Consider Flomax, bladder scan if pt shows signs of urinary retention

## 2021-02-25 NOTE — PHYSICAL THERAPY NOTE
Physical Therapy Progress Note     02/25/21 1420   PT Last Visit   PT Visit Date 02/25/21   Note Type   Note Type Treatment   Pain Assessment   Pain Assessment Tool 0-10   Pain Score 1   Pain Location/Orientation Orientation: Right;Location: Foot   Restrictions/Precautions   RLE Weight Bearing Per Order NWB   Other Precautions Pain; Fall Risk;Multiple lines;WBS   Subjective   Subjective Pt encountered seated in recliner, pleasant and agreeable to treatment  Reports no new complaints since last session  No changes in symptoms despite tachycardia with activity  Transfers   Sit to Stand 3  Moderate assistance   Additional items Assist x 1; Armrests; Increased time required   Stand to Sit 4  Minimal assistance   Additional items Assist x 1; Armrests; Increased time required;Verbal cues   Ambulation/Elevation   Gait pattern Excessively slow; Short stride; Inconsistent rai  (3 point gait pattern)   Gait Assistance 4  Minimal assist   Additional items Assist x 1  (+ chair follow)   Assistive Device Rolling walker   Distance 25' x 2   Balance   Static Sitting Good   Static Standing Fair   Ambulatory Fair -   Endurance Deficit   Endurance Deficit Yes   Endurance Deficit Description tachycardia on telemetry throughout session with HR in 110s at rest, elevated up to 170s with activity, returning to 110-120 with >3 minute sitting rest   Activity Tolerance   Activity Tolerance Patient tolerated treatment well;Patient limited by fatigue   Nurse RAVEN Natarajan   Assessment   Prognosis Good   Problem List Decreased endurance; Impaired balance;Decreased mobility; Impaired hearing;Orthopedic restrictions;Pain   Assessment Pt continues to require increased assist to perform sit to stand transfers due to weight bearing status, weakness & mechanical disadvantages related to pt's height  Once standing, pt able to ambualte short distances while maintaining NWB status  Distances limited by tachycardia for safety    pt does not report chest pain, dyspnea, or lightheadedness with activity despite unsteady vitals  Pillow placed on pt's seat at conclusion of session to hopefully reduce requisite effort to stand  Discussed discharge plan & goal to maximize independence in safe environment prior to returning home  Pt verbalized understanding & ofered no further questions or concerns at this time  Barriers to Discharge Inaccessible home environment   Goals   Patient Goals to keep getting better   STG Expiration Date 03/05/21   PT Treatment Day 1   Plan   Treatment/Interventions Functional transfer training;LE strengthening/ROM; Elevations; Therapeutic exercise; Endurance training;Patient/family training;Equipment eval/education; Bed mobility;Gait training   Progress Progressing toward goals   PT Frequency Other (Comment)  (3-6x/week)   Recommendation   PT Discharge Recommendation Post-Acute Rehabilitation Services   Equipment Recommended [address] [facility] Recommended Wheeled walker   AM-[facility] Basic Mobility Inpatient   Turning in Bed Without Bedrails 4   Lying on Back to Sitting on Edge of Flat Bed 3   Moving Bed to Chair 2   Standing Up From Chair 2   Walk in Room 3   Climb 3-5 Stairs 2   Basic Mobility Inpatient Raw Score 16   Basic Mobility Standardized Score 38 32   The patient's AM-PAC Basic Mobility Inpatient Short Form Raw Score is 16, Standardized Score is 38 32  A standardized score less than 42 9 suggests the patient may benefit from discharge to post-acute rehabilitation services  Please also refer to the recommendation of the Physical Therapist for safe discharge planning      Flory Tran PTA

## 2021-02-25 NOTE — PLAN OF CARE
Problem: Potential for Falls  Goal: Patient will remain free of falls  Description: INTERVENTIONS:  - Assess patient frequently for physical needs  -  Identify cognitive and physical deficits and behaviors that affect risk of falls    -  [place] fall precautions as indicated by assessment   - Educate patient/family on patient safety including physical limitations  - Instruct patient to call for assistance with activity based on assessment  - Modify environment to reduce risk of injury  - Consider OT/PT consult to assist with strengthening/mobility  Outcome: Progressing     Problem: Prexisting or High Potential for Compromised Skin Integrity  Goal: Skin integrity is maintained or improved  Description: INTERVENTIONS:  - Identify patients at risk for skin breakdown  - Assess and monitor skin integrity  - Assess and monitor nutrition and hydration status  - Monitor labs   - Assess for incontinence   - Turn and reposition patient  - Assist with mobility/ambulation  - Relieve pressure over bony prominences  - Avoid friction and shearing  - Provide appropriate hygiene as needed including keeping skin clean and dry  - Evaluate need for skin moisturizer/barrier cream  - Collaborate with interdisciplinary team   - Patient/family teaching  - Consider wound care consult   Outcome: Progressing     Problem: PAIN - ADULT  Goal: Verbalizes/displays adequate comfort level or baseline comfort level  Description: Interventions:  - Encourage patient to monitor pain and request assistance  - Assess pain using appropriate pain scale  - Administer analgesics based on type and severity of pain and evaluate response  - Implement non-pharmacological measures as appropriate and evaluate response  - Consider cultural and social influences on pain and pain management  - Notify physician/advanced practitioner if interventions unsuccessful or patient reports new pain  Outcome: Progressing     Problem: INFECTION - ADULT  Goal: Absence or prevention of progression during hospitalization  Description: INTERVENTIONS:  - Assess and monitor for signs and symptoms of infection  - Monitor lab/diagnostic results  - Monitor all insertion sites, i e  indwelling lines, tubes, and drains  - Monitor endotracheal if appropriate and nasal secretions for changes in amount and color  - [place] appropriate cooling/warming therapies per order  - Administer medications as ordered  - Instruct and encourage patient and family to use good hand hygiene technique  - Identify and instruct in appropriate isolation precautions for identified infection/condition  Outcome: Progressing     Problem: SAFETY ADULT  Goal: Patient will remain free of falls  Description: INTERVENTIONS:  - Assess patient frequently for physical needs  -  Identify cognitive and physical deficits and behaviors that affect risk of falls    -  [place] fall precautions as indicated by assessment   - Educate patient/family on patient safety including physical limitations  - Instruct patient to call for assistance with activity based on assessment  - Modify environment to reduce risk of injury  - Consider OT/PT consult to assist with strengthening/mobility  Outcome: Progressing  Goal: Maintain or return to baseline ADL function  Description: INTERVENTIONS:  -  Assess patient's ability to carry out ADLs; assess patient's baseline for ADL function and identify physical deficits which impact ability to perform ADLs (bathing, care of mouth/teeth, toileting, grooming, dressing, etc )  - Assess/evaluate cause of self-care deficits   - Assess range of motion  - Assess patient's mobility; develop plan if impaired  - Assess patient's need for assistive devices and provide as appropriate  - Encourage maximum independence but intervene and supervise when necessary  - Involve family in performance of ADLs  - Assess for home care needs following discharge   - Consider OT consult to assist with ADL evaluation and planning for discharge  - Provide patient education as appropriate  Outcome: Progressing  Goal: Maintain or return mobility status to optimal level  Description: INTERVENTIONS:  - Assess patient's baseline mobility status (ambulation, transfers, stairs, etc )    - Identify cognitive and physical deficits and behaviors that affect mobility  - Identify mobility aids required to assist with transfers and/or ambulation (gait belt, sit-to-stand, lift, walker, cane, etc )  - [place] fall precautions as indicated by assessment  - Record patient progress and toleration of activity level on Mobility SBAR; progress patient to next Phase/Stage  - Instruct patient to call for assistance with activity based on assessment  - Consider rehabilitation consult to assist with strengthening/weightbearing, etc   Outcome: Progressing     Problem: SAFETY ADULT  Goal: Maintain or return to baseline ADL function  Description: INTERVENTIONS:  -  Assess patient's ability to carry out ADLs; assess patient's baseline for ADL function and identify physical deficits which impact ability to perform ADLs (bathing, care of mouth/teeth, toileting, grooming, dressing, etc )  - Assess/evaluate cause of self-care deficits   - Assess range of motion  - Assess patient's mobility; develop plan if impaired  - Assess patient's need for assistive devices and provide as appropriate  - Encourage maximum independence but intervene and supervise when necessary  - Involve family in performance of ADLs  - Assess for home care needs following discharge   - Consider OT consult to assist with ADL evaluation and planning for discharge  - Provide patient education as appropriate  Outcome: Progressing

## 2021-02-25 NOTE — QUICK NOTE
Podiatry  Kaylan Falcon  78 y o  male MRN: [id number]  Unit/Bed#: [facility] 624-01 Encounter: [id number]    V  A C  Procedure Note    Date: 2/23/2021  Time: 1200    Location of wound: right foot    Sponges removed:  0 Black Sponges    Dimensions of wound: 15 cm x 3 cm x 2 0 cm    Description of wound: exposed bone and subcutaneous tissues    Sponges placed:  5 Black Sponges    VAC settings:  125 mmHg  Continuous    Pt tolerated procedure well  VAC sticker placed to wound dressing, per protocol    Nursing was communicated with regarding number of sponges removed and implanted    Next VAC change: 2/26      Johnson Sousa DPM  2/25/2021    Portions of the record may have been created with voice recognition software  Occasional wrong word or "sound a like" substitutions may have occurred due to the inherent limitations of voice recognition software  Read the chart carefully and recognize, using context, where substitutions have occurred

## 2021-02-25 NOTE — CASE MANAGEMENT
Pt asked to s/w CM  Met with pt  His first choice for rehab is [facility]  His wife & him requested additional back up choices to [facility], [facility], [facility] & [facility]  Referrals sent  Pt hopeful to go to acute rehab

## 2021-02-25 NOTE — PROGRESS NOTES
Podiatry - Progress Note  Patient: Shabbir Rico  78 y o  male   MRN: [id number]  PCP: Elaina Wetzel MD  Unit/Bed#: [facility] 344-21 Encounter: [id number]  Date Of Visit: 21    ASSESSMENT:    Shabbir Rico  is a 78 y o  male with:    1  Right foot hematoma, surgical site infection, ischemia  - S/p exfix removal, I&D, fasciotomy (DOS 21)  - s/p TMA of left foot   2  Congenital talipes equinovarus right foot             - s/p midfoot arthrodesis, exfix application (DOS 52)  3  T2Dm, last A1C 7 8 %  4  HTN  5  Altered mental status  6  Constipation  7  atrial fibrillation  8  Sepsis on admission - resolved      PLAN:    · Wound vac change  to determine treatment plan  · Dressings are dry clean and intact  Wound vac is working properly, running at 125 low continuous  · Elevation on green foam wedges or pillows when non-ambulatory  · Appreciate consulting services for recommendations and management  Antibiotics started: Amoxicillin  Mechanical VTE Prophylaxis: sequential compression device   Weightbearing status: NWB RLE    Disposition:  Patient requires continued stay for sx intervention     SUBJECTIVE:     The patient was seen, evaluated, and assessed at bedside today  The patient was awake, alert, and in no acute distress  No acute events overnight  The patient denies pain  Patient denies N/V/F/chills/SOB/CP  OBJECTIVE:     Vitals:   /88   Pulse 71   Temp 98 °F (36 7 °C)   Resp 17   Ht 6' 4" (1 93 m)   Wt 109 kg (240 lb 15 4 oz)   SpO2 97%   BMI 29 33 kg/m²     Temp (24hrs), Av 2 °F (36 8 °C), Min:97 7 °F (36 5 °C), Max:99 1 °F (37 3 °C)      Physical Exam:     General: Alert, cooperative and no distress  Lungs: Non labored breathing  Abdomen: Soft, non-tender  Lower extremity exam:  Cardiovascular status at baseline  Neurological status at baseline  Musculoskeletal status at baseline  No calf tenderness noted       Lower extremity wound(s) as noted below:  Dressings are dry clean and intact  Wound vac working well; running at 125 low continuous with good output and without leaks or blockage    Additional Data:     Labs:    Results from last 7 days   Lab Units 02/24/21  0548   WBC Thousand/uL 10 07   HEMOGLOBIN g/dL 10 9*   HEMATOCRIT % 33 8*   PLATELETS Thousands/uL 473*     Results from last 7 days   Lab Units 02/24/21  0548   POTASSIUM mmol/L 3 5   CHLORIDE mmol/L 107   CO2 mmol/L 25   BUN mg/dL 10   CREATININE mg/dL 1 03   CALCIUM mg/dL 8 7           * I Have Reviewed All Lab Data Listed Above  Recent Cultures (last 7 days):               Imaging: I have personally reviewed pertinent films in PACS  EKG, Pathology, and Other Studies: I have personally reviewed pertinent reports  ** Please Note: Portions of the record may have been created with voice recognition software  Occasional wrong word or "sound a like" substitutions may have occurred due to the inherent limitations of voice recognition software  Read the chart carefully and recognize, using context, where substitutions have occurred   **

## 2021-02-25 NOTE — ASSESSMENT & PLAN NOTE
Patient feels constipated and has to strain in order to have a bowel movement    Per nursing he is having regular bowel movements, with the exception of a small amount of blood noted 2/24 PM   He has a history of hemorrhoids   - Continue Senokot  - PRN miralax  - Monitor bowel movements

## 2021-02-25 NOTE — PROGRESS NOTES
INTERNAL MEDICINE RESIDENCY PROGRESS NOTE     Name: Niraj Romero  Age & Sex: 78 y o  male   MRN: [id number]  Unit/Bed#: [facility] 624-01   Encounter: [id number]  Team: SOD Team B     PATIENT INFORMATION     Name: Niraj Romero  Age & Sex: 78 y o  male   MRN: [id number]  Hospital Stay Days: 9    ASSESSMENT/PLAN     Principal Problem:    Sepsis [facility])  Active Problems:    Hyperlipidemia    Type 2 diabetes mellitus with diabetic neuropathy (HCC)    Atrial fibrillation with RVR (HCC)    Essential hypertension    Benign prostatic hyperplasia with lower urinary tract symptoms    Congenital talipes equinovarus, right foot    Post-operative state    Cellulitis of extremity    Abscess    Constipation      Constipation  Assessment & Plan  Patient feels constipated and has to strain in order to have a bowel movement  Per nursing he is having regular bowel movements, with the exception of a small amount of blood noted 2/24 PM   He has a history of hemorrhoids   - Continue Senokot  - PRN miralax  - Monitor bowel movements      Benign prostatic hyperplasia with lower urinary tract symptoms  Assessment & Plan  Patient has a history of BPH and has intermittent urinary symptoms which resolve on their own  For the past few days he is having difficulty initiating a strong stream of urine  He is having adequate urine output     - Continue to monitor  - Consider bladder scan if pt shows signs of urinary retention    Atrial fibrillation with RVR [facility])  Assessment & Plan  Patient has longstanding history of atrial fibrillation on Cardizem 120 BID for rate control and Eliquis for anticoagulation   Does not follow with cardiology outpatient  - Continue eliquis   - no indication to trend INR unless useful to surgical team  -received metoprolol 5mg yesterday afternoon, currently rate controlled  -continue metoprolol 100 mg q 12   -metoprolol 5mg IV q6 PRN for HR >120  -monitor on telemetry    Type 2 diabetes mellitus with diabetic neuropathy ([facility] [address] )  Assessment & Plan  On sitagliptin, metformin, and glimepiride outpatient: all held on admission  Not previously on insulin  Hyperglycemia noted at night  Continue to monitor, likely elevated in the setting of acute illness  -continue Lantus to 15 units q h s  and SSI    Goal blood glucose 140-180      Hyperlipidemia  Assessment & Plan  Stable  Continue home atorvastatin 40mg    * Sepsis ([facility] [address] )  Assessment & Plan  Due to infected hardware/hematoma of R foot, now status post removal and washout, POD#6  Underwent TMA yesterday postop day 2  - management of right foot as per surgery  - blood cultures negative x 72 hours  - ID consulted, appreciated  - Ampicillin and doxy PO, course ends 3/1          Disposition: Podiatry primary, Afib and diabetes currently controlled  Continue to follow    SUBJECTIVE     Patient seen and examined  No acute events overnight  He complains of constipation and difficulty with initiating urine stream   Denies chest pain, shortness of breath, nausea, vomiting, diarrhea, leg pain  Pain is well controlled from a surgical standpoint  OBJECTIVE     Vitals:    21 0719 21 0847 21 0847 21 1059   BP: 141/90 151/91 151/91 145/88   Pulse: 69 95 69 71   Resp: 14   17   Temp: 97 8 °F (36 6 °C)  97 9 °F (36 6 °C) 98 °F (36 7 °C)   TempSrc:       SpO2: 97% 97% 97% 97%   Weight:       Height:          Temperature:   Temp (24hrs), Av 2 °F (36 8 °C), Min:97 7 °F (36 5 °C), Max:99 1 °F (37 3 °C)    Temperature: 98 °F (36 7 °C)  Intake & Output:  I/O        07 -  0700  07 -  0700  07 -  0700    P  O  240  360    I V  (mL/kg) 738 8 (6 8)      IV Piggyback 100      Total Intake(mL/kg) 1078 8 (9 9)  360 (3 3)    Urine (mL/kg/hr) 1175 (0 4) 0 (0)     Drains  150     Stool 0      Blood       Total Output 1175 150     Net -96 3 -150 +360           Unmeasured Urine Occurrence 4 x 5 x 1 x    Unmeasured Stool Occurrence 3 x 3 x Weights:   IBW: 86 8 kg    Body mass index is 29 33 kg/m²  Weight (last 2 days)     None        Physical Exam  Constitutional:       Appearance: Normal appearance  Cardiovascular:      Rate and Rhythm: Normal rate  Rhythm irregular  Heart sounds: Normal heart sounds  Pulmonary:      Effort: Pulmonary effort is normal       Breath sounds: Normal breath sounds  Abdominal:      General: Bowel sounds are normal  There is no distension  Palpations: Abdomen is soft  There is no mass  Tenderness: There is no abdominal tenderness  Neurological:      Mental Status: He is alert  LABORATORY DATA     Labs: I have personally reviewed pertinent reports  Results from last 7 days   Lab Units 02/24/21  0548 02/23/21  0510 02/22/21  0551   WBC Thousand/uL 10 07 11 26* 9 89   HEMOGLOBIN g/dL 10 9* 11 6* 11 1*   HEMATOCRIT % 33 8* 35 8* 34 9*   PLATELETS Thousands/uL 473* 456* 387      Results from last 7 days   Lab Units 02/24/21  0548 02/23/21  0510 02/22/21  0551   POTASSIUM mmol/L 3 5 3 9 3 8   CHLORIDE mmol/L 107 111* 112*   CO2 mmol/L 25 28 24   BUN mg/dL 10 8 8   CREATININE mg/dL 1 03 0 91 0 82   CALCIUM mg/dL 8 7 8 8 8 8                            IMAGING & DIAGNOSTIC TESTING     Radiology Results: I have personally reviewed pertinent reports  Xr Foot 2 Vw Right    Result Date: 2/17/2021  Impression: Findings suspicious for infection involving the 4th and 5th tarsometatarsal joints, less conspicuous previously  Status post surgery Workstation performed: [id number]     Xr Foot Right 3+ Views    Result Date: 2/18/2021  Impression: Removal of the external fixation device  Otherwise stable alignment of the post surgical changes  Workstation performed: [id number]     Other Diagnostic Testing: I have personally reviewed pertinent reports      ACTIVE MEDICATIONS     Current Facility-Administered Medications   Medication Dose Route Frequency    acetaminophen (TYLENOL) tablet 650 mg  650 mg Oral Q6H PRN    amoxicillin (AMOXIL) capsule 500 mg  500 mg Oral Q8H [address]    apixaban (ELIQUIS) tablet 5 mg  5 mg Oral BID    atorvastatin (LIPITOR) tablet 40 mg  40 mg Oral Daily    bisacodyl (DULCOLAX) rectal suppository 10 mg  10 mg Rectal Once    doxycycline hyclate (VIBRAMYCIN) capsule 100 mg  100 mg Oral Q12H [address]    HYDROmorphone (DILAUDID) injection 0 5 mg  0 5 mg Intravenous Q4H PRN    insulin glargine (LANTUS) subcutaneous injection 15 Units 0 15 mL  15 Units Subcutaneous HS    insulin lispro (HumaLOG) 100 units/mL subcutaneous injection 1-6 Units  1-6 Units Subcutaneous 4x Daily (AC & HS)    lisinopril (ZESTRIL) tablet 40 mg  40 mg Oral Daily    metoprolol (LOPRESSOR) injection 5 mg  5 mg Intravenous Q6H PRN    metoprolol tartrate (LOPRESSOR) tablet 100 mg  100 mg Oral Q12H SUNG    oxyCODONE (ROXICODONE) immediate release tablet 10 mg  10 mg Oral Q4H PRN    oxyCODONE (ROXICODONE) IR tablet 5 mg  5 mg Oral Q4H PRN    polyethylene glycol (MIRALAX) packet 17 g  17 g Oral Daily PRN    senna-docusate sodium (SENOKOT S) 8 6-50 mg per tablet 1 tablet  1 tablet Oral HS       VTE Pharmacologic Prophylaxis: Reason for no pharmacologic prophylaxis Eliquis  VTE Mechanical Prophylaxis: sequential compression device    Portions of the record may have been created with voice recognition software  Occasional wrong word or "sound a like" substitutions may have occurred due to the inherent limitations of voice recognition software    Read the chart carefully and recognize, using context, where substitutions have occurred   ==  [address]

## 2021-02-26 LAB
GLUCOSE SERPL-MCNC: 189 MG/DL (ref 65–140)
GLUCOSE SERPL-MCNC: 214 MG/DL (ref 65–140)
GLUCOSE SERPL-MCNC: 240 MG/DL (ref 65–140)
GLUCOSE SERPL-MCNC: 295 MG/DL (ref 65–140)

## 2021-02-26 PROCEDURE — 99024 POSTOP FOLLOW-UP VISIT: CPT | Performed by: PODIATRIST

## 2021-02-26 PROCEDURE — 97535 SELF CARE MNGMENT TRAINING: CPT

## 2021-02-26 PROCEDURE — 97530 THERAPEUTIC ACTIVITIES: CPT

## 2021-02-26 PROCEDURE — 82948 REAGENT STRIP/BLOOD GLUCOSE: CPT

## 2021-02-26 PROCEDURE — 97116 GAIT TRAINING THERAPY: CPT

## 2021-02-26 PROCEDURE — 99232 SBSQ HOSP IP/OBS MODERATE 35: CPT | Performed by: INTERNAL MEDICINE

## 2021-02-26 RX ORDER — LORAZEPAM 0.5 MG/1
0.5 TABLET ORAL EVERY 8 HOURS PRN
Status: DISCONTINUED | OUTPATIENT
Start: 2021-02-26 | End: 2021-03-10 | Stop reason: HOSPADM

## 2021-02-26 RX ADMIN — METOPROLOL TARTRATE 100 MG: 50 TABLET, FILM COATED ORAL at 08:09

## 2021-02-26 RX ADMIN — AMOXICILLIN 500 MG: 500 CAPSULE ORAL at 22:45

## 2021-02-26 RX ADMIN — LISINOPRIL 40 MG: 20 TABLET ORAL at 08:10

## 2021-02-26 RX ADMIN — ATORVASTATIN CALCIUM 40 MG: 40 TABLET, FILM COATED ORAL at 08:09

## 2021-02-26 RX ADMIN — APIXABAN 5 MG: 5 TABLET, FILM COATED ORAL at 17:12

## 2021-02-26 RX ADMIN — POLYETHYLENE GLYCOL 3350 17 G: 17 POWDER, FOR SOLUTION ORAL at 08:09

## 2021-02-26 RX ADMIN — APIXABAN 5 MG: 5 TABLET, FILM COATED ORAL at 08:12

## 2021-02-26 RX ADMIN — DOXYCYCLINE 100 MG: 100 CAPSULE ORAL at 08:10

## 2021-02-26 RX ADMIN — AMOXICILLIN 500 MG: 500 CAPSULE ORAL at 14:58

## 2021-02-26 RX ADMIN — DOXYCYCLINE 100 MG: 100 CAPSULE ORAL at 22:44

## 2021-02-26 RX ADMIN — METOPROLOL TARTRATE 100 MG: 50 TABLET, FILM COATED ORAL at 22:44

## 2021-02-26 RX ADMIN — INSULIN GLARGINE 15 UNITS: 100 INJECTION, SOLUTION SUBCUTANEOUS at 22:44

## 2021-02-26 RX ADMIN — SENNOSIDES, DOCUSATE SODIUM 1 TABLET: 8.6; 5 TABLET ORAL at 22:43

## 2021-02-26 RX ADMIN — AMOXICILLIN 500 MG: 500 CAPSULE ORAL at 05:26

## 2021-02-26 NOTE — PLAN OF CARE
Problem: PHYSICAL THERAPY ADULT  Goal: Performs mobility at highest level of function for planned discharge setting  See evaluation for individualized goals  Description: Treatment/Interventions: Functional transfer training, LE strengthening/ROM, Elevations, Therapeutic exercise, Endurance training, Bed mobility, Gait training, OT  Equipment Recommended: Walker(at this time)       See flowsheet documentation for full assessment, interventions and recommendations  Outcome: Progressing  Note: Prognosis: Good  Problem List: Decreased endurance, Impaired balance, Decreased mobility, Impaired hearing, Orthopedic restrictions, Pain  Assessment: Pt continues to perform transfers & ambulate short distances with use of RW without LOB  Did require instructions for RW proximity to improve UE support and balance during gait, as well as sequencing for safe chair approach & need for assist for transfers  Pt impulsively stood from [facility] without staff present, but did not demosntrate LOB while attempting to clean self in standing  Did require assist to reach for pants to ensure abiilty to maintain balance & NWB RLE  Pt returned to bed with 2 pillows under RLE & HOB elevated  Encouraged pt to perform mobiilty tasks with staff outside of therapy to improve quality of mobility  May be appropriate for wheelchair mobiilty to safely progress endurance for distance mobiilty & reduce risk for falls given unstable HR with activity  Barriers to Discharge: Inaccessible home environment  Barriers to Discharge Comments: At conclusion of PT session pt returned BTB with phone and call bell within reach  Pt denies any further questions at this time  The patient's AM-PAC Basic Mobility Inpatient Short Form Raw Score is 14, Standardized Score is 35 55  A standardized score less than 42 9 suggests the patient may benefit from discharge to post-acute rehabilitation services   However, anticipate pt will progress quickly to allow for d/c home as he has good social support and an accessible home  Please also refer to the recommendation of the Physical Therapist for safe discharge planning  Recommending home with social support and HHPT upon d/c    PT Discharge Recommendation: Post-Acute Rehabilitation Services     PT - OK to Discharge: Yes(to rehab when medically cleared)    See flowsheet documentation for full assessment

## 2021-02-26 NOTE — PLAN OF CARE
Problem: Potential for Falls  Goal: Patient will remain free of falls  Description: INTERVENTIONS:  - Assess patient frequently for physical needs  -  Identify cognitive and physical deficits and behaviors that affect risk of falls    -  [place] fall precautions as indicated by assessment   - Educate patient/family on patient safety including physical limitations  - Instruct patient to call for assistance with activity based on assessment  - Modify environment to reduce risk of injury  - Consider OT/PT consult to assist with strengthening/mobility  Outcome: Progressing     Problem: Prexisting or High Potential for Compromised Skin Integrity  Goal: Skin integrity is maintained or improved  Description: INTERVENTIONS:  - Identify patients at risk for skin breakdown  - Assess and monitor skin integrity  - Assess and monitor nutrition and hydration status  - Monitor labs   - Assess for incontinence   - Turn and reposition patient  - Assist with mobility/ambulation  - Relieve pressure over bony prominences  - Avoid friction and shearing  - Provide appropriate hygiene as needed including keeping skin clean and dry  - Evaluate need for skin moisturizer/barrier cream  - Collaborate with interdisciplinary team   - Patient/family teaching  - Consider wound care consult   Outcome: Progressing     Problem: PAIN - ADULT  Goal: Verbalizes/displays adequate comfort level or baseline comfort level  Description: Interventions:  - Encourage patient to monitor pain and request assistance  - Assess pain using appropriate pain scale  - Administer analgesics based on type and severity of pain and evaluate response  - Implement non-pharmacological measures as appropriate and evaluate response  - Consider cultural and social influences on pain and pain management  - Notify physician/advanced practitioner if interventions unsuccessful or patient reports new pain  Outcome: Progressing     Problem: INFECTION - ADULT  Goal: Absence or prevention of progression during hospitalization  Description: INTERVENTIONS:  - Assess and monitor for signs and symptoms of infection  - Monitor lab/diagnostic results  - Monitor all insertion sites, i e  indwelling lines, tubes, and drains  - Monitor endotracheal if appropriate and nasal secretions for changes in amount and color  - [place] appropriate cooling/warming therapies per order  - Administer medications as ordered  - Instruct and encourage patient and family to use good hand hygiene technique  - Identify and instruct in appropriate isolation precautions for identified infection/condition  Outcome: Progressing     Problem: SAFETY ADULT  Goal: Patient will remain free of falls  Description: INTERVENTIONS:  - Assess patient frequently for physical needs  -  Identify cognitive and physical deficits and behaviors that affect risk of falls    -  [place] fall precautions as indicated by assessment   - Educate patient/family on patient safety including physical limitations  - Instruct patient to call for assistance with activity based on assessment  - Modify environment to reduce risk of injury  - Consider OT/PT consult to assist with strengthening/mobility  Outcome: Progressing  Goal: Maintain or return to baseline ADL function  Description: INTERVENTIONS:  -  Assess patient's ability to carry out ADLs; assess patient's baseline for ADL function and identify physical deficits which impact ability to perform ADLs (bathing, care of mouth/teeth, toileting, grooming, dressing, etc )  - Assess/evaluate cause of self-care deficits   - Assess range of motion  - Assess patient's mobility; develop plan if impaired  - Assess patient's need for assistive devices and provide as appropriate  - Encourage maximum independence but intervene and supervise when necessary  - Involve family in performance of ADLs  - Assess for home care needs following discharge   - Consider OT consult to assist with ADL evaluation and planning for discharge  - Provide patient education as appropriate  Outcome: Progressing  Goal: Maintain or return mobility status to optimal level  Description: INTERVENTIONS:  - Assess patient's baseline mobility status (ambulation, transfers, stairs, etc )    - Identify cognitive and physical deficits and behaviors that affect mobility  - Identify mobility aids required to assist with transfers and/or ambulation (gait belt, sit-to-stand, lift, walker, cane, etc )  - [place] fall precautions as indicated by assessment  - Record patient progress and toleration of activity level on Mobility SBAR; progress patient to next Phase/Stage  - Instruct patient to call for assistance with activity based on assessment  - Consider rehabilitation consult to assist with strengthening/weightbearing, etc   Outcome: Progressing     Problem: DISCHARGE PLANNING  Goal: Discharge to home or other facility with appropriate resources  Description: INTERVENTIONS:  - Identify barriers to discharge w/patient and caregiver  - Arrange for needed discharge resources and transportation as appropriate  - Identify discharge learning needs (meds, wound care, etc )  - Arrange for interpretive services to assist at discharge as needed  - Refer to Case Management Department for coordinating discharge planning if the patient needs post-hospital services based on physician/advanced practitioner order or complex needs related to functional status, cognitive ability, or social support system  Outcome: Progressing

## 2021-02-26 NOTE — PROGRESS NOTES
Pastoral Care Progress Note    2021  Patient: Bean Sandoval   : 1950  Admission Date & Time: 2021 1630  MRN: [id number] [facility]: [id number]                     Chaplaincy Interventions Utilized:   Empowerment: Encouraged focus on present    Exploration: Explored hope, Explored emotional needs & resources and Explored spiritual needs & resources    Relationship Building: Cultivated a relationship of care and support and Listened empathically    Chaplaincy Outcomes Achieved:  Expressed gratitude and Expressed intermediate hope    Spiritual Coping Strategies Utilized:   Connectedness       21 1200   Clinical Encounter Type   Visited With Patient and family together   Routine Visit Follow-up

## 2021-02-26 NOTE — PROGRESS NOTES
Progress Note - Infectious Disease   Bean Sandoval  78 y o  male MRN: [id number]  Unit/Bed#: [facility] 624-01 Encounter: [id number]      Impression/Plan:    1- Postoperative bleeding  complicated by hematoma formation,  compartment syndrome,  with superimposed infection:  Patient was taken to operation room 2/17/2021 where he underwent external fixator removal, hematoma evacuation, fasciotomy   Small amount of purulence noted   Wound cultures collected intraoperatively from lateral and medial aspect of the foot both growing Enterobacter cloaca   E faecalis  also growing in 1 operative culture specimen  Due to progressive ischemia, patient was taken again to OR 02/22/2021 where he underwent TMA   Operative report reviewed and case discussed with podiatrist Dr Alisia North was removed   All infected tissue removed    - continue amoxicillin doxycycline p o   - as infected tissue and hardware in its entirety now removed, would continue p o  Antibiotic  to finish 1 week of postoperative course through 03/01/2021  - podiatry follow-up, wound dressing changes planned for today and then based on wound appearance, patient might need Plastic surgery evaluation for wound coverage versus just keeping wound VAC in place with outpatient follow-up with Podiatry           2- diabetes mellitus type 2, A1c 7 8%   Risk factor for infectious complications as mentioned above  -  management as per primary service team      3- atrial fibrillation:  -rate control and anticoagulation as per primary service team    Plan mentioned above discussed with patient and with Podiatry team  Case also discussed with patient's wife present at the bedside  Will sign off, call back ID service for questions or if change in clinical status    Subjective:  Patient has no fever, chills, sweats; no nausea, vomiting, diarrhea; no cough, shortness of breath; no pain  No new symptoms      Objective:  Vitals:  Temp:  [97 7 °F (36 5 °C)-99 °F (37 2 °C)] 98 2 °F (36 8 °C)  HR:  [] 118  Resp:  [17-18] 18  BP: (140-152)/() 152/75  SpO2:  [97 %-99 %] 97 %  Temp (24hrs), Av 3 °F (36 8 °C), Min:97 7 °F (36 5 °C), Max:99 °F (37 2 °C)  Current: Temperature: 98 2 °F (36 8 °C)    Physical Exam:   General Appearance:  Alert, interactive, nontoxic, no acute distress  Throat: Oropharynx moist without lesions  Lungs:   Clear to auscultation bilaterally; no wheezes, rhonchi or rales; respirations unlabored   Heart:  RRR; no murmur, rub or gallop   Abdomen:   Soft, non-tender, non-distended, positive bowel sounds  Extremities: No clubbing, cyanosis or edema  Right foot dressing with wound VAC in place   Skin: No new rashes or lesions         Labs, Imaging, & Other studies:   All pertinent labs and imaging studies were personally reviewed  Results from last 7 days   Lab Units 21  0548 21  0510 21  0551   WBC Thousand/uL 10 07 11 26* 9 89   HEMOGLOBIN g/dL 10 9* 11 6* 11 1*   PLATELETS Thousands/uL 473* 456* 387     Results from last 7 days   Lab Units 21  0548 21  0510 21  0551   SODIUM mmol/L 139 144 142   POTASSIUM mmol/L 3 5 3 9 3 8   CHLORIDE mmol/L 107 111* 112*   CO2 mmol/L 25 28 24   BUN mg/dL 10 8 8   CREATININE mg/dL 1 03 0 91 0 82   EGFR ml/min/1 73sq m 74 86 91   CALCIUM mg/dL 8 7 8 8 8 8         Results from last 7 days   Lab Units 21  1000   PROCALCITONIN ng/ml 5 33*

## 2021-02-26 NOTE — PLAN OF CARE
Problem: OCCUPATIONAL THERAPY ADULT  Goal: Performs self-care activities at highest level of function for planned discharge setting  See evaluation for individualized goals  Description: Treatment Interventions: ADL retraining, Functional transfer training, UE strengthening/ROM, Patient/family training, Endurance training, Equipment evaluation/education, Compensatory technique education, Continued evaluation, Energy conservation, Activityengagement          See flowsheet documentation for full assessment, interventions and recommendations  Outcome: Progressing  Note: Limitation: Decreased ADL status, Decreased Safe judgement during ADL, Decreased cognition, Decreased endurance, Decreased self-care trans, Decreased high-level ADLs  Prognosis: Good  Assessment: pt participated in am ot session and was seen focusing on adls seated at the eob  pt was able to groom and perform ub care with vc's only, he required mod asst for lb adl's and toileting  pt stood from eob with min asst and several self directed trials  pt performed spt with asst for vac line and balance  vc's to line self up to chair prior to sitting  pt was cooperative, reports not sleeping well and is concerned about his r le  pt awaiting dressing change by md  post session pt was left in chair on chair alarm with call bell in reach  will follow focusing on goals from ie  at this time feel pt requires in pt rehab  pts wife works p t  and he is home alone at times thus needing to be mod I        OT Discharge Recommendation: Post-Acute Rehabilitation Services  OT - OK to Discharge:  Yes  CRISTINA Lew

## 2021-02-26 NOTE — PROGRESS NOTES
INTERNAL MEDICINE RESIDENCY PROGRESS NOTE     Name: Ray Kim  Age & Sex: 78 y o  male   MRN: [id number]  Unit/Bed#: [facility] 624-01   Encounter: [id number]  Team: SOD Team B     PATIENT INFORMATION     Name: Ray Kim  Age & Sex: 78 y o  male   MRN: [id number]  Hospital Stay Days: 10    ASSESSMENT/PLAN     Principal Problem:    Sepsis [facility])  Active Problems:    Hyperlipidemia    Type 2 diabetes mellitus with diabetic neuropathy (HCC)    Atrial fibrillation with RVR ([facility] )    Essential hypertension    Benign prostatic hyperplasia with lower urinary tract symptoms    Congenital talipes equinovarus, right foot    Post-operative state    Cellulitis of extremity    Abscess    Constipation      * Sepsis ([facility] )  Assessment & Plan  Due to infected hardware/hematoma of R foot, now status post removal and washout, POD#7  Underwent TMA yesterday postop day 3  - management of right foot as per surgery  - blood cultures negative  - ID consulted, appreciated  - Ampicillin and doxy PO, course ends 3/1, management per Infectious Disease and Podiatry  Patient appears hemodynamically stable for more than 48 hours, with all medical needs addressed  Medicine will sign off, please contact with any questions  Thank you    Constipation  Assessment & Plan  Patient feels constipated and has to strain in order to have a bowel movement  Per nursing he is having regular bowel movements, with the exception of a small amount of blood noted 2/24 PM   He has a history of hemorrhoids   - Continue Senokot  - PRN miralax  - Monitor bowel movements      Cellulitis of extremity  Assessment & Plan  Status post TMA  Podiatry primary  Infectious disease following, appreciated  Antibiotics as described below    Benign prostatic hyperplasia with lower urinary tract symptoms  Assessment & Plan  Patient has a history of BPH and has intermittent urinary symptoms which resolve on their own   For the past few days he is having difficulty initiating a strong stream of urine  He is having adequate urine output     - Continue to monitor  - Consider Flomax, bladder scan if pt shows signs of urinary retention    Essential hypertension  Assessment & Plan  - patient has history of longstanding hypertension on lisinopril 40mg daily and Cardizem 120mg BID  Currently stable  - continue lisinopril  - cardizem to change to metoprolol as detailed elsewhere  - trend BMP     Atrial fibrillation with RVR ([address] )  Assessment & Plan  Patient has longstanding history of atrial fibrillation on Cardizem 120 BID for rate control and Eliquis for anticoagulation  Does not follow with cardiology outpatient  - Continue eliquis  -continue metoprolol 100 mg q 12   -metoprolol 5mg IV q6 PRN for HR >120  -monitor on telemetry  -hemodynamically stable for more than 48 hours, medicine will sign off, please contact with any other questions    Type 2 diabetes mellitus with diabetic neuropathy (HCC)  Assessment & Plan  On sitagliptin, metformin, and glimepiride outpatient: all held on admission  Not previously on insulin  Hyperglycemia noted at night  Continue to monitor, likely elevated in the setting of acute illness  Blood sugars stable for 48 hours  -continue Lantus to 15 units q h s  and SSI  Goal blood glucose 180-220      Hyperlipidemia  Assessment & Plan  Stable  Continue home atorvastatin 40mg        Disposition:  Management per primary team   Medicine will sign off for now, please contact with any questions  SUBJECTIVE     Patient seen and examined  No acute events overnight  Patient denies any chest pain, palpitations, shortness of breath  He expressed some frustration on his current condition  Tolerating p o  Diet  Having normal bowel movements  Difficulty with urinating has improved  Rest of ROS was negative      OBJECTIVE     Vitals:    02/26/21 0034 02/26/21 0239 02/26/21 0705 02/26/21 1047   BP:  145/89 140/78 145/71   Pulse:  82 102 93   Resp:  17 17 17   Temp:  97 7 °F (36 5 °C) 97 9 °F (36 6 °C) 98 °F (36 7 °C)   TempSrc:    Rectal   SpO2: 98% 99% 98% 97%   Weight:       Height:          Temperature:   Temp (24hrs), Av 3 °F (36 8 °C), Min:97 7 °F (36 5 °C), Max:99 °F (37 2 °C)    Temperature: 98 °F (36 7 °C)  Intake & Output:  I/O        07 07 0700    P  O   510     I V  (mL/kg)       IV Piggyback       Total Intake(mL/kg)  510 (4 7)     Urine (mL/kg/hr) 0 (0) 600 (0 2) 200 (0 4)    Drains 150 150     Stool  0     Total Output 150 750 200    Net -150 -240 -200           Unmeasured Urine Occurrence 5 x 2 x     Unmeasured Stool Occurrence 3 x 1 x         Weights:   IBW: 86 8 kg    Body mass index is 29 33 kg/m²  Weight (last 2 days)     None        Physical Exam  Constitutional:       General: He is not in acute distress  Appearance: Normal appearance  He is not ill-appearing  HENT:      Head: Normocephalic and atraumatic  Mouth/Throat:      Mouth: Mucous membranes are moist    Eyes:      Extraocular Movements: Extraocular movements intact  Conjunctiva/sclera: Conjunctivae normal       Pupils: Pupils are equal, round, and reactive to light  Cardiovascular:      Rate and Rhythm: Normal rate  Rhythm irregular  Pulses: Normal pulses  Heart sounds: Normal heart sounds  No murmur  No friction rub  No gallop  Pulmonary:      Effort: Pulmonary effort is normal  No respiratory distress  Breath sounds: Normal breath sounds  No wheezing or rales  Abdominal:      General: Abdomen is flat  Bowel sounds are normal  There is no distension  Palpations: Abdomen is soft  Tenderness: There is no abdominal tenderness  There is no guarding  Musculoskeletal:         General: Deformity present  Right lower leg: No edema  Left lower leg: No edema  Comments: Right TMA noted, dressing clean intact   Skin:     General: Skin is warm and dry     Neurological:      General: No focal deficit present  Mental Status: He is alert and oriented to person, place, and time  Psychiatric:         Mood and Affect: Mood normal          Behavior: Behavior normal        LABORATORY DATA     Labs: I have personally reviewed pertinent reports  Results from last 7 days   Lab Units 02/24/21  0548 02/23/21  0510 02/22/21  0551   WBC Thousand/uL 10 07 11 26* 9 89   HEMOGLOBIN g/dL 10 9* 11 6* 11 1*   HEMATOCRIT % 33 8* 35 8* 34 9*   PLATELETS Thousands/uL 473* 456* 387      Results from last 7 days   Lab Units 02/24/21  0548 02/23/21  0510 02/22/21  0551   POTASSIUM mmol/L 3 5 3 9 3 8   CHLORIDE mmol/L 107 111* 112*   CO2 mmol/L 25 28 24   BUN mg/dL 10 8 8   CREATININE mg/dL 1 03 0 91 0 82   CALCIUM mg/dL 8 7 8 8 8 8                            IMAGING & DIAGNOSTIC TESTING     Radiology Results: I have personally reviewed pertinent reports  Xr Foot 2 Vw Right    Result Date: 2/17/2021  Impression: Findings suspicious for infection involving the 4th and 5th tarsometatarsal joints, less conspicuous previously  Status post surgery Workstation performed: [id number]     Xr Foot Right 3+ Views    Result Date: 2/18/2021  Impression: Removal of the external fixation device  Otherwise stable alignment of the post surgical changes  Workstation performed: [id number]     Other Diagnostic Testing: I have personally reviewed pertinent reports      ACTIVE MEDICATIONS     Current Facility-Administered Medications   Medication Dose Route Frequency    acetaminophen (TYLENOL) tablet 650 mg  650 mg Oral Q6H PRN    amoxicillin (AMOXIL) capsule 500 mg  500 mg Oral Q8H [facility]    apixaban (ELIQUIS) tablet 5 mg  5 mg Oral BID    atorvastatin (LIPITOR) tablet 40 mg  40 mg Oral Daily    bisacodyl (DULCOLAX) rectal suppository 10 mg  10 mg Rectal Once    doxycycline hyclate (VIBRAMYCIN) capsule 100 mg  100 mg Oral Q12H [facility]    HYDROmorphone (DILAUDID) injection 0 5 mg  0 5 mg Intravenous Q4H PRN    insulin glargine (LANTUS) subcutaneous injection 15 Units 0 15 mL  15 Units Subcutaneous HS    insulin lispro (HumaLOG) 100 units/mL subcutaneous injection 1-6 Units  1-6 Units Subcutaneous 4x Daily (AC & HS)    lisinopril (ZESTRIL) tablet 40 mg  40 mg Oral Daily    metoprolol (LOPRESSOR) injection 5 mg  5 mg Intravenous Q6H PRN    metoprolol tartrate (LOPRESSOR) tablet 100 mg  100 mg Oral Q12H [facility]    oxyCODONE (ROXICODONE) immediate release tablet 10 mg  10 mg Oral Q4H PRN    oxyCODONE (ROXICODONE) IR tablet 5 mg  5 mg Oral Q4H PRN    polyethylene glycol (MIRALAX) packet 17 g  17 g Oral Daily PRN    senna-docusate sodium (SENOKOT S) 8 6-50 mg per tablet 1 tablet  1 tablet Oral HS       VTE Pharmacologic Prophylaxis: Reason for no pharmacologic prophylaxis Eliquis  VTE Mechanical Prophylaxis: sequential compression device    Portions of the record may have been created with voice recognition software  Occasional wrong word or "sound a like" substitutions may have occurred due to the inherent limitations of voice recognition software    Read the chart carefully and recognize, using context, where substitutions have occurred   ==  Nathanial Cushing, [address] [facility]  Internal Medicine Residency PGY-2

## 2021-02-26 NOTE — UTILIZATION REVIEW
Continued Stay Review    Date: 2/26                         Current Patient Class: Inpatient Current Level of Care: Med Surg    HPI:70 y o  male initially admitted on 2/16     Assessment/Plan: Sepsis Due to infected hardware/hematoma of R foot, now s/p removal and washout, POD#4, Atrial Fib  2/17 S/p INCISION AND DRAINAGE (I&D) EXTREMITY (Right)  REMOVAL EXTERNAL FIXATOR (Right)  2/22 S/p TMA of left foot     Plastic surgery consult given extent of the defect and minimal granulation with VAC therapy medially for tissue coverage         Pertinent Labs/Diagnostic Results:       Results from last 7 days   Lab Units 02/24/21  0548 02/23/21  0510 02/22/21  0551 02/21/21  1002 02/20/21  0546   WBC Thousand/uL 10 07 11 26* 9 89 9 16 9 89   HEMOGLOBIN g/dL 10 9* 11 6* 11 1* 11 8* 10 6*   HEMATOCRIT % 33 8* 35 8* 34 9* 36 5 32 3*   PLATELETS Thousands/uL 473* 456* 387 360 261         Results from last 7 days   Lab Units 02/24/21  0548 02/23/21  0510 02/22/21  0551 02/21/21  1002 02/20/21  0546   SODIUM mmol/L 139 144 142 142 141   POTASSIUM mmol/L 3 5 3 9 3 8 3 7 3 6   CHLORIDE mmol/L 107 111* 112* 110* 109*   CO2 mmol/L 25 28 24 27 25   ANION GAP mmol/L 7 5 6 5 7   BUN mg/dL 10 8 8 10 10   CREATININE mg/dL 1 03 0 91 0 82 0 93 0 73   EGFR ml/min/1 73sq m 74 86 91 84 95   CALCIUM mg/dL 8 7 8 8 8 8 8 9 8 4         Results from last 7 days   Lab Units 02/26/21  1123 02/26/21  0713 02/25/21  2116 02/25/21  1728 02/25/21  1727 02/25/21  1137 02/25/21  0732 02/24/21  2107 02/24/21  1704 02/24/21  1129 02/24/21  0741 02/23/21  2045   POC GLUCOSE mg/dl 214* 189* 260* 240* 238* 175* 176* 250* 299* 247* 144* 186*     Results from last 7 days   Lab Units 02/24/21  0548 02/23/21  0510 02/22/21  0551 02/21/21  1002 02/20/21  0546   GLUCOSE RANDOM mg/dL 161* 153* 166* 247* 172*       Results from last 7 days   Lab Units 02/20/21  1000   PROCALCITONIN ng/ml 5 33*     Results from last 7 days   Lab Units 02/20/21  [address] [place] [address] Non-reactive   HEP C AB  Non-reactive       Vital Signs:   02/26/21 1047  98 °F (36 7 °C)  93  17  145/71  --  97 %  --   02/26/21 0705  97 9 °F (36 6 °C)  102  17  140/78  --  98 %  --   02/26/21 02:39:52  97 7 °F (36 5 °C)  82  17  145/89  108  99 %         Medications:   Scheduled Medications:  amoxicillin, 500 mg, Oral, Q8H SUNG  apixaban, 5 mg, Oral, BID  atorvastatin, 40 mg, Oral, Daily  bisacodyl, 10 mg, Rectal, Once  doxycycline hyclate, 100 mg, Oral, Q12H [address]  insulin glargine, 15 Units, Subcutaneous, HS  insulin lispro, 1-6 Units, Subcutaneous, 4x Daily (AC & HS)  lisinopril, 40 mg, Oral, Daily  metoprolol tartrate, 100 mg, Oral, Q12H SUNG  senna-docusate sodium, 1 tablet, Oral, HS      Continuous IV Infusions: None     PRN Meds:  acetaminophen, 650 mg, Oral, Q6H PRN  HYDROmorphone, 0 5 mg, Intravenous, Q4H PRN  metoprolol, 5 mg, Intravenous, Q6H PRN  oxyCODONE, 10 mg, Oral, Q4H PRN  oxyCODONE, 5 mg, Oral, Q4H PRN  polyethylene glycol, 17 g, Oral, Daily PRN      Discharge Plan: D    Network Utilization Review Department  ATTENTION: Please call with any questions or concerns to [phone] and carefully listen to the prompts so that you are directed to the right person  All voicemails are confidential   [facility] all requests for admission clinical reviews, approved or denied determinations and any other requests to dedicated fax number below belonging to the campus where the patient is receiving treatment   List of dedicated fax numbers for the Facilities:  [address] [place] [address] DENIALS (Administrative/Medical Necessity) [phone]   [address] [place] [address] (Maternity/NICU/Pediatrics) 261 [facility] [address] (  [address])   [address]  [facility] [address] [phone]   [place] [id number] [phone]

## 2021-02-26 NOTE — PHYSICAL THERAPY NOTE
Physical Therapy Progress Note     02/26/21 1535   PT Last Visit   PT Visit Date 02/26/21   Note Type   Note Type Treatment   Pain Assessment   Pain Assessment Tool Pain Assessment not indicated - pt denies pain   Restrictions/Precautions   RLE Weight Bearing Per Order NWB   Other Precautions Impulsive; Fall Risk;Multiple lines  (wound vac)   Subjective   Subjective pt encountered supine in bed, about to transfer to [facility] with RN  Pleasant and agreeable to treatment at that time  Reports no new complaints at this time  Bed Mobility   Supine to Sit 5  Supervision   Additional items Assist x 1; Increased time required   Sit to Supine 5  Supervision   Additional items Assist x 1; Increased time required   Transfers   Sit to Stand 5  Supervision   Additional items Assist x 1; Armrests; Increased time required   Stand to Sit 4  Minimal assistance   Additional items Assist x 1; Armrests; Increased time required   Stand pivot 4  Minimal assistance   Additional items Assist x 1; Armrests; Increased time required   Toilet transfer 4  Minimal assistance   Additional items Assist x 1; Armrests; Increased time required; Impulsive;Commode   Ambulation/Elevation   Gait pattern Excessively slow; Short stride; Foward flexed; Inconsistent rai;Decreased foot clearance; Improper Weight shift  (3 point gait pattern)   Gait Assistance 4  Minimal assist   Additional items Assist x 1  (+ chair follow)   Assistive Device Rolling walker   Distance 35' x 2   Balance   Static Sitting Good   Static Standing Fair   Ambulatory Poor +   Endurance Deficit   Endurance Deficit Yes   Endurance Deficit Description tachycardia with HR 110s at rest, elevated upwards of 170s with activity, retrunring to 120s in 2 mins at rest   Activity Tolerance   Activity Tolerance Patient tolerated treatment well   Nurse Malia Telles Dr , RN   Assessment   Prognosis Good   Problem List Decreased endurance; Impaired balance;Decreased mobility; Impaired hearing;Orthopedic restrictions;Pain   Assessment Pt continues to perform transfers & ambulate short distances with use of RW without LOB  Did require instructions for RW proximity to improve UE support and balance during gait, as well as sequencing for safe chair approach & need for assist for transfers  Pt impulsively stood from [facility] without staff present, but did not demosntrate LOB while attempting to clean self in standing  Did require assist to reach for pants to ensure abiilty to maintain balance & NWB RLE  Pt returned to bed with 2 pillows under RLE & HOB elevated  Encouraged pt to perform mobiilty tasks with staff outside of therapy to improve quality of mobility  May be appropriate for wheelchair mobiilty to safely progress endurance for distance mobiilty & reduce risk for falls given unstable HR with activity  Barriers to Discharge Inaccessible home environment   Goals   Patient Goals to keep getting better   STG Expiration Date 03/05/21   PT Treatment Day 2   Plan   Treatment/Interventions Functional transfer training;LE strengthening/ROM; Therapeutic exercise; Endurance training;Patient/family training;Equipment eval/education; Bed mobility;Gait training   Progress Progressing toward goals   PT Frequency Other (Comment)  (3-6x/week)   Recommendation   PT Discharge Recommendation Post-Acute Rehabilitation Services   Equipment Recommended [facility] Recommended Wheeled walker   AM-PAC Basic Mobility Inpatient   Turning in Bed Without Bedrails 4   Lying on Back to Sitting on Edge of Flat Bed 4   Moving Bed to Chair 3   Standing Up From Chair 3   Walk in Room 3   Climb 3-5 Stairs 2   Basic Mobility Inpatient Raw Score 19   Basic Mobility Standardized Score 42 48   The patient's AM-PAC Basic Mobility Inpatient Short Form Raw Score is 19, Standardized Score is 42 48  A standardized score less than 42 9 suggests the patient may benefit from discharge to post-acute rehabilitation services   Please also refer to the recommendation of the Physical Therapist for safe discharge planning      Merrick Lozano PTA

## 2021-02-26 NOTE — ASSESSMENT & PLAN NOTE
Status post TMA  Podiatry primary  Infectious disease following, appreciated  Antibiotics as described below

## 2021-02-26 NOTE — PROGRESS NOTES
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 %  4  HTN  5  Altered mental status  6  Constipation  7  atrial fibrillation  8  Sepsis on admission - resolved    PLAN:    · Given the extent of the defect and minimal granulation with VAC therapy medially, we have consulted plastic surgery for tissue coverage  · Discussed all treatment options including continuing with wound vac therapy which would likely take many months to form enough granulation tissue to graft over if at all, local wound care, and BKA which is not likely given brisk CFT to the plantar flap and dopplerable pulses to the foot  · Patient, his sister, and his wife were all present during vac change and discussion  All agree with the plan  · Wound vac as below  · Elevation on green foam wedges or pillows when non-ambulatory  · Appreciate consulting services for recommendations and management  Wound VAC application  1  Number of sponges: 6 black  2  Pressure settin continuous  3  See below for description and measurements    A Verbal time out was performed confirming correct patient and extremity  Nursing was present for wound vac change     Antibiotics started: Amoxicillin  Mechanical VTE Prophylaxis: sequential compression device   Weightbearing status: NWB RLE     Disposition:  Patient requires continued stay for sx intervention     SUBJECTIVE:     The patient was seen, evaluated, and assessed at bedside today  The patient was awake, alert, and in no acute distress   No acute events overnight  The patient reports no complaints, he does report some anxiety about the current situation but states "I trust you and the plan " Patient denies N/V/F/chills/SOB/CP  OBJECTIVE:     Vitals:   /75   Pulse (!) 118   Temp 98 2 °F (36 8 °C)   Resp 18   Ht 6' 4" (1 93 m)   Wt 109 kg (240 lb 15 4 oz)   SpO2 97%   BMI 29 33 kg/m²     Temp (24hrs), Av 3 °F (36 8 °C), Min:97 7 °F (36 5 °C), Max:99 °F (37 2 °C)      Physical Exam:     General: Alert, cooperative and no distress  Lungs: Non labored breathing  Abdomen: Soft, non-tender  Lower extremity exam:  Cardiovascular status at baseline  Neurological status at baseline  Musculoskeletal status at baseline  No calf tenderness noted  Medial, lateral aspects of TMA incision remain open, as well as the plantar medial arch incision  In total these measure 15 x 3 x 2 cm  They are fibrogranular in nature, with larger amounts of granular tissue noted laterally  There is exposed bone within the wound beds  Wounds appear without signs of active infection: no purulence, no malodor, no ascending erythema, no crepitus, no fluctuance  Plantar flap is pink, warm, with brisk capillary refill  Additional Data:     Labs:    Results from last 7 days   Lab Units 21  0548   WBC Thousand/uL 10 07   HEMOGLOBIN g/dL 10 9*   HEMATOCRIT % 33 8*   PLATELETS Thousands/uL 473*     Results from last 7 days   Lab Units 21  0548   POTASSIUM mmol/L 3 5   CHLORIDE mmol/L 107   CO2 mmol/L 25   BUN mg/dL 10   CREATININE mg/dL 1 03   CALCIUM mg/dL 8 7           * I Have Reviewed All Lab Data Listed Above  Recent Cultures (last 7 days):               Imaging: I have personally reviewed pertinent films in PACS  EKG, Pathology, and Other Studies: I have personally reviewed pertinent reports  ** Please Note: Portions of the record may have been created with voice recognition software   Occasional wrong word or "sound a like" substitutions may have occurred due to the inherent limitations of voice recognition software  Read the chart carefully and recognize, using context, where substitutions have occurred   **

## 2021-02-26 NOTE — OCCUPATIONAL THERAPY NOTE
Occupational Therapy Treatment Note:       02/26/21 0930   OT Last Visit   OT Visit Date 02/26/21   Note Type   Note Type Treatment   Restrictions/Precautions   RLE Weight Bearing Per Order NWB  (+vac dressing)   Other Precautions Fall Risk   Pain Assessment   Pain Assessment Tool 0-10   Pain Score No Pain   ADL   Where Assessed Edge of bed   Grooming Assistance 5  Supervision/Setup   UB Bathing Assistance 5  Supervision/Setup   LB Bathing Assistance 5  Supervision/Setup   UB Dressing Assistance 5  Supervision/Setup   LB Dressing Assistance 3  Moderate Assistance   Toileting Assistance  3  Moderate Assistance   Toileting Comments asst for toilet hygiene   Functional Standing Tolerance   Time   (f minus balance during clothing management/anal care,1 min)   Bed Mobility   Supine to Sit 5  Supervision   Transfers   Sit to Stand 4  Minimal assistance   Additional items   (rw)   Stand to Sit 4  Minimal assistance   Stand pivot 4  Minimal assistance  (rw)   Additional items   (mod vc's as pt is used to crutches pta)   Cognition   Comments [place], mod vc's thropughout session for sequencing different adls and for safe techniques use and carryover of rw techniques  Activity Tolerance   Activity Tolerance Patient tolerated treatment well   Assessment   Assessment pt participated in am ot session and was seen focusing on adls seated at the eob  pt was able to groom and perform ub care with vc's only, he required mod asst for lb adl's and toileting  pt stood from eob with min asst and several self directed trials  pt performed spt with asst for vac line and balance  vc's to line self up to chair prior to sitting  pt was cooperative, reports not sleeping well and is concerned about his r le  pt awaiting dressing change by md  post session pt was left in chair on chair alarm with call bell in reach  will follow focusing on goals from ie   at this time feel pt requires in pt rehab  pts wife works p [employer]  and he is home alone at times thus needing to be mod I      Plan   Treatment Interventions ADL retraining;Functional transfer training; Endurance training;Patient/family training;Equipment evaluation/education; Activityengagement   Goal Expiration Date 03/06/21   OT Treatment Day 3   OT Frequency 3-5x/wk   Recommendation   OT Discharge Recommendation Post-Acute Rehabilitation Services   OT - OK to Discharge Yes   AM-PAC Daily Activity Inpatient   Lower Body Dressing 2   Bathing 3   Toileting 2   Upper Body Dressing 4   Grooming 4   Eating 4   Daily Activity Raw Score 19   Daily Activity Standardized Score (Calc for Raw Score >=11) 40 22   AM-PAC Applied Cognition Inpatient   Following a Speech/Presentation 3   Understanding Ordinary Conversation 4   Taking Medications 3   Remembering Where Things Are Placed or Put Away 3   Remembering List of 4-5 Errands 3   Taking Care of Complicated Tasks 3   Applied Cognition Raw Score 19   Applied Cognition Standardized Score 39 77   April A CRISTINA Lee

## 2021-02-27 ENCOUNTER — TELEPHONE (OUTPATIENT)
Dept: RADIOLOGY | Facility: HOSPITAL | Age: 71
End: 2021-02-27

## 2021-02-27 LAB
GLUCOSE SERPL-MCNC: 196 MG/DL (ref 65–140)
GLUCOSE SERPL-MCNC: 210 MG/DL (ref 65–140)
GLUCOSE SERPL-MCNC: 239 MG/DL (ref 65–140)
GLUCOSE SERPL-MCNC: 339 MG/DL (ref 65–140)

## 2021-02-27 PROCEDURE — 97530 THERAPEUTIC ACTIVITIES: CPT

## 2021-02-27 PROCEDURE — 82948 REAGENT STRIP/BLOOD GLUCOSE: CPT

## 2021-02-27 PROCEDURE — 97116 GAIT TRAINING THERAPY: CPT

## 2021-02-27 PROCEDURE — 99024 POSTOP FOLLOW-UP VISIT: CPT | Performed by: PODIATRIST

## 2021-02-27 RX ADMIN — METOPROLOL TARTRATE 100 MG: 50 TABLET, FILM COATED ORAL at 09:26

## 2021-02-27 RX ADMIN — AMOXICILLIN 500 MG: 500 CAPSULE ORAL at 05:12

## 2021-02-27 RX ADMIN — AMOXICILLIN 500 MG: 500 CAPSULE ORAL at 14:00

## 2021-02-27 RX ADMIN — ATORVASTATIN CALCIUM 40 MG: 40 TABLET, FILM COATED ORAL at 09:26

## 2021-02-27 RX ADMIN — DOXYCYCLINE 100 MG: 100 CAPSULE ORAL at 21:31

## 2021-02-27 RX ADMIN — APIXABAN 5 MG: 5 TABLET, FILM COATED ORAL at 09:26

## 2021-02-27 RX ADMIN — APIXABAN 5 MG: 5 TABLET, FILM COATED ORAL at 17:22

## 2021-02-27 RX ADMIN — INSULIN GLARGINE 15 UNITS: 100 INJECTION, SOLUTION SUBCUTANEOUS at 21:31

## 2021-02-27 RX ADMIN — AMOXICILLIN 500 MG: 500 CAPSULE ORAL at 21:32

## 2021-02-27 RX ADMIN — LISINOPRIL 40 MG: 20 TABLET ORAL at 09:26

## 2021-02-27 RX ADMIN — DOXYCYCLINE 100 MG: 100 CAPSULE ORAL at 09:26

## 2021-02-27 RX ADMIN — SENNOSIDES, DOCUSATE SODIUM 1 TABLET: 8.6; 5 TABLET ORAL at 21:31

## 2021-02-27 RX ADMIN — METOPROLOL TARTRATE 100 MG: 50 TABLET, FILM COATED ORAL at 21:31

## 2021-02-27 NOTE — PROGRESS NOTES
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 %  4  HTN  5  Altered mental status  6  Constipation  7  atrial fibrillation  8  Sepsis on admission - resolved      PLAN:    · Wound vac is working properly, continues 125 mmhg  Dressings are dry clean and intact  · Plan for OR on Monday 3/1 for surgical debridement, VAC change per plastic team   · F/u CTA  · F/u bladder scan  · Elevation on green foam wedges or pillows when non-ambulatory  · Appreciate consulting services for recommendations and management  Antibiotics started: Amoxicillin  Mechanical VTE Prophylaxis: sequential compression device   Weightbearing status: NWB RLE    Disposition:  Patient requires continued stay for sx intervention  SUBJECTIVE:     The patient was seen, evaluated, and assessed at bedside today  The patient was awake, alert, and in no acute distress  No acute events overnight  The patient reports that he is having difficulty urinating  Pt denies pain at this time  Patient denies N/V/F/chills/SOB/CP  OBJECTIVE:     Vitals:   /78   Pulse 85   Temp (!) 97 4 °F (36 3 °C)   Resp 19   Ht 6' 4" (1 93 m)   Wt 109 kg (240 lb 15 4 oz)   SpO2 97%   BMI 29 33 kg/m²     Temp (24hrs), Av 9 °F (36 6 °C), Min:97 4 °F (36 3 °C), Max:98 4 °F (36 9 °C)      Physical Exam:     General: Alert, cooperative and no distress  Lungs: Non labored breathing  Abdomen: Soft, non-tender  Lower extremity exam:  Cardiovascular status at baseline    Neurological status at baseline  Musculoskeletal status at baseline  No calf tenderness noted  Wound vac is working properly, continues 125 mmhg      Additional Data:     Labs:    Results from last 7 days   Lab Units 02/24/21  0548   WBC Thousand/uL 10 07   HEMOGLOBIN g/dL 10 9*   HEMATOCRIT % 33 8*   PLATELETS Thousands/uL 473*     Results from last 7 days   Lab Units 02/24/21  0548   POTASSIUM mmol/L 3 5   CHLORIDE mmol/L 107   CO2 mmol/L 25   BUN mg/dL 10   CREATININE mg/dL 1 03   CALCIUM mg/dL 8 7           * I Have Reviewed All Lab Data Listed Above  Recent Cultures (last 7 days):               Imaging: I have personally reviewed pertinent films in PACS  EKG, Pathology, and Other Studies: I have personally reviewed pertinent reports  ** Please Note: Portions of the record may have been created with voice recognition software  Occasional wrong word or "sound a like" substitutions may have occurred due to the inherent limitations of voice recognition software  Read the chart carefully and recognize, using context, where substitutions have occurred   **

## 2021-02-27 NOTE — TREATMENT PLAN
Plastic Surgery Attending     Pt seen and examined  Pt s/p TMA with unfortunately surgical dehiscence with exposed bone  I discussed with the patient my preliminary toughs of limb salvage vs BKA amputation  Plan: OR on Monday for surgical debridement, VAC change and surgical planning depeding on findings  Will obtain CTA in anticipation for limb salvage as per patient desires  Formal consult to follow       [facility] [address] [facility]   [address]   Office: [phone]

## 2021-02-27 NOTE — PHYSICAL THERAPY NOTE
Physical Therapy Progress Note        02/27/21 0958   PT Last Visit   PT Visit Date 02/27/21   Note Type   Note Type Treatment   Pain Assessment   Pain Assessment Tool Pain Assessment not indicated - pt denies pain   Pain Score No Pain   Pain Location/Orientation Orientation: Right;Location: Foot   Hospital Pain Intervention(s) Ambulation/increased activity;Repositioned   Restrictions/Precautions   Weight Bearing Precautions Per Order Yes   RLE Weight Bearing Per Order NWB   Other Precautions Impulsive; Fall Risk;Multiple lines  (wound vac)   General   Chart Reviewed Yes   Family/Caregiver Present No   Cognition   Overall Cognitive Status WFL   Arousal/Participation Alert; Cooperative   Comments Patient is pleasant and cooperative, cued for pacing    Bed Mobility   Supine to Sit 5  Supervision   Additional items Assist x 1; Increased time required   Sit to Supine 5  Supervision   Additional items Assist x 1; Increased time required   Transfers   Sit to Stand 5  Supervision   Additional items Assist x 1; Armrests; Increased time required   Stand to Sit 4  Minimal assistance   Additional items Assist x 1; Armrests; Increased time required   Stand pivot 4  Minimal assistance   Additional items Assist x 1; Increased time required;Verbal cues   Ambulation/Elevation   Gait pattern Excessively slow; Step to  (hopping )   Gait Assistance 4  Minimal assist   Additional items Assist x 1;Verbal cues   Assistive Device Rolling walker   Distance 30 feet x2    Balance   Static Sitting Good   Dynamic Sitting Fair   Static Standing Fair   Dynamic Standing Poor +   Ambulatory Poor +   Endurance Deficit   Endurance Deficit Yes   Activity Tolerance   Activity Tolerance Patient tolerated treatment well   Nurse Made Aware Appropriate to see per RN   Assessment   Prognosis Good   Problem List Decreased strength;Decreased endurance; Impaired balance;Decreased mobility; Decreased coordination; Impaired judgement;Decreased safety awareness;Orthopedic restrictions   Assessment Patient lying supine in bed, eager to participate in therapy  He was able to navigate bed with use of bed rails and increase time  Patient was able to stand within RW with s/min A without arm rest  He was able to navigate hallways with use of RW and hopping gait with standing/seated rest breaks with no LOB  Patient able to sit in bed side recliner post ambulation with S  He would continue to benefit from skilled PT to maximize functional independence  Barriers to Discharge Inaccessible home environment   Goals   Patient Goals To walk   STG Expiration Date 03/05/21   PT Treatment Day 3   Plan   Treatment/Interventions Functional transfer training;LE strengthening/ROM; Therapeutic exercise; Endurance training;Gait training;Bed mobility;Spoke to nursing   Progress Progressing toward goals   PT Frequency   (3-6x a week)   Recommendation   PT Discharge Recommendation Post-Acute Rehabilitation Services   Equipment Recommended Walker  (RW)   Walker Package Recommended Wheeled walker   PT - OK to Discharge Yes   Additional Comments to rehab when medically stable   AM-PAC Basic Mobility Inpatient   Turning in Bed Without Bedrails 4   Lying on Back to Sitting on Edge of Flat Bed 4   Moving Bed to Chair 3   Standing Up From Chair 3   Walk in Room 3   Climb 3-5 Stairs 2   Basic Mobility Inpatient Raw Score 19   Basic Mobility Standardized Score 42 48     Sigrid Garcia, PTA

## 2021-02-27 NOTE — PLAN OF CARE
Problem: PHYSICAL THERAPY ADULT  Goal: Performs mobility at highest level of function for planned discharge setting  See evaluation for individualized goals  Description: Treatment/Interventions: Functional transfer training, LE strengthening/ROM, Therapeutic exercise, Endurance training, Gait training, Bed mobility, Spoke to nursing  Equipment Recommended: Walker(RW)       See flowsheet documentation for full assessment, interventions and recommendations  Note: Prognosis: Good  Problem List: Decreased strength, Decreased endurance, Impaired balance, Decreased mobility, Decreased coordination, Impaired judgement, Decreased safety awareness, Orthopedic restrictions  Assessment: Patient lying supine in bed, eager to participate in therapy  He was able to navigate bed with use of bed rails and increase time  Patient was able to stand within RW with s/min A without arm rest  He was able to navigate hallways with use of RW and hopping gait with standing/seated rest breaks with no LOB  Patient able to sit in bed side recliner post ambulation with S  He would continue to benefit from skilled PT to maximize functional independence  Barriers to Discharge: Inaccessible home environment  Barriers to Discharge Comments: At conclusion of PT session pt returned BTB with phone and call bell within reach  Pt denies any further questions at this time  The patient's AM-PAC Basic Mobility Inpatient Short Form Raw Score is 14, Standardized Score is 35 55  A standardized score less than 42 9 suggests the patient may benefit from discharge to post-acute rehabilitation services  However, anticipate pt will progress quickly to allow for d/c home as he has good social support and an accessible home  Please also refer to the recommendation of the Physical Therapist for safe discharge planning   Recommending home with social support and HHPT upon d/c    PT Discharge Recommendation: Post-Acute Rehabilitation Services     PT - OK to Discharge: Yes    See flowsheet documentation for full assessment

## 2021-02-28 ENCOUNTER — APPOINTMENT (INPATIENT)
Dept: RADIOLOGY | Facility: HOSPITAL | Age: 71
DRG: 856 | End: 2021-02-28
Payer: COMMERCIAL

## 2021-02-28 LAB
GLUCOSE SERPL-MCNC: 146 MG/DL (ref 65–140)
GLUCOSE SERPL-MCNC: 224 MG/DL (ref 65–140)
GLUCOSE SERPL-MCNC: 236 MG/DL (ref 65–140)
GLUCOSE SERPL-MCNC: 240 MG/DL (ref 65–140)

## 2021-02-28 PROCEDURE — 99024 POSTOP FOLLOW-UP VISIT: CPT | Performed by: PODIATRIST

## 2021-02-28 PROCEDURE — 82948 REAGENT STRIP/BLOOD GLUCOSE: CPT

## 2021-02-28 PROCEDURE — G1004 CDSM NDSC: HCPCS

## 2021-02-28 PROCEDURE — 73706 CT ANGIO LWR EXTR W/O&W/DYE: CPT

## 2021-02-28 RX ADMIN — AMOXICILLIN 500 MG: 500 CAPSULE ORAL at 21:30

## 2021-02-28 RX ADMIN — INSULIN GLARGINE 15 UNITS: 100 INJECTION, SOLUTION SUBCUTANEOUS at 21:29

## 2021-02-28 RX ADMIN — METOPROLOL TARTRATE 100 MG: 50 TABLET, FILM COATED ORAL at 08:02

## 2021-02-28 RX ADMIN — AMOXICILLIN 500 MG: 500 CAPSULE ORAL at 13:23

## 2021-02-28 RX ADMIN — METOPROLOL TARTRATE 100 MG: 50 TABLET, FILM COATED ORAL at 21:29

## 2021-02-28 RX ADMIN — APIXABAN 5 MG: 5 TABLET, FILM COATED ORAL at 08:02

## 2021-02-28 RX ADMIN — AMOXICILLIN 500 MG: 500 CAPSULE ORAL at 05:31

## 2021-02-28 RX ADMIN — LISINOPRIL 40 MG: 20 TABLET ORAL at 08:02

## 2021-02-28 RX ADMIN — APIXABAN 5 MG: 5 TABLET, FILM COATED ORAL at 17:19

## 2021-02-28 RX ADMIN — DOXYCYCLINE 100 MG: 100 CAPSULE ORAL at 08:02

## 2021-02-28 RX ADMIN — ATORVASTATIN CALCIUM 40 MG: 40 TABLET, FILM COATED ORAL at 08:02

## 2021-02-28 RX ADMIN — IOHEXOL 120 ML: 350 INJECTION, SOLUTION INTRAVENOUS at 09:10

## 2021-02-28 RX ADMIN — DOXYCYCLINE 100 MG: 100 CAPSULE ORAL at 21:29

## 2021-02-28 RX ADMIN — LORAZEPAM 0.5 MG: 0.5 TABLET ORAL at 21:36

## 2021-02-28 RX ADMIN — SENNOSIDES, DOCUSATE SODIUM 1 TABLET: 8.6; 5 TABLET ORAL at 21:29

## 2021-02-28 NOTE — PROGRESS NOTES
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 8 %  4  HTN  5  Altered mental status  6  Constipation  7  atrial fibrillation  8  Sepsis on admission - resolved       PLAN:    · Plan for OR on Monday 3/1 for surgical debridement, vac change per Plastic team     · Wound vac working well, continues 125 mmhg  Dressings are dry clean and intact  · F/u CTA  · Elevation on green foam wedges or pillows when non-ambulatory  · Appreciate consulting services for recommendations and management  Antibiotics started: amoxicillin  Mechanical VTE Prophylaxis: sequential compression device   Weightbearing status: NWB RLE    Disposition:  Patient requires continued stay for surgical intervention    SUBJECTIVE:     The patient was seen, evaluated, and assessed at bedside today  The patient was awake, alert, and in no acute distress  No acute events overnight  The patient reports that he is able to urinate  Pt states that it is much easier to urinate standing up  He continue to denie pain  Patient denies N/V/F/chills/SOB/CP  OBJECTIVE:     Vitals:   /74   Pulse 79   Temp 98 5 °F (36 9 °C)   Resp 17   Ht 6' 4" (1 93 m)   Wt 109 kg (240 lb 15 4 oz)   SpO2 99%   BMI 29 33 kg/m²     Temp (24hrs), Av 4 °F (36 9 °C), Min:97 8 °F (36 6 °C), Max:99 1 °F (37 3 °C)      Physical Exam:     General: Alert, cooperative and no distress  Lungs: Non labored breathing  Abdomen: Soft, non-tender  Lower extremity exam:  Cardiovascular status at baseline    Neurological status at baseline  Musculoskeletal status at baseline  No calf tenderness noted  Wound vac working properly, 125mmhg continues  Dressings are dry clean and intact  Additional Data:     Labs:    Results from last 7 days   Lab Units 02/24/21  0548   WBC Thousand/uL 10 07   HEMOGLOBIN g/dL 10 9*   HEMATOCRIT % 33 8*   PLATELETS Thousands/uL 473*     Results from last 7 days   Lab Units 02/24/21  0548   POTASSIUM mmol/L 3 5   CHLORIDE mmol/L 107   CO2 mmol/L 25   BUN mg/dL 10   CREATININE mg/dL 1 03   CALCIUM mg/dL 8 7           * I Have Reviewed All Lab Data Listed Above  Recent Cultures (last 7 days):               Imaging: I have personally reviewed pertinent films in PACS  EKG, Pathology, and Other Studies: I have personally reviewed pertinent reports  ** Please Note: Portions of the record may have been created with voice recognition software  Occasional wrong word or "sound a like" substitutions may have occurred due to the inherent limitations of voice recognition software  Read the chart carefully and recognize, using context, where substitutions have occurred   **

## 2021-03-01 ENCOUNTER — ANESTHESIA (INPATIENT)
Dept: PERIOP | Facility: HOSPITAL | Age: 71
DRG: 856 | End: 2021-03-01
Payer: COMMERCIAL

## 2021-03-01 ENCOUNTER — ANESTHESIA EVENT (INPATIENT)
Dept: PERIOP | Facility: HOSPITAL | Age: 71
DRG: 856 | End: 2021-03-01
Payer: COMMERCIAL

## 2021-03-01 LAB
GLUCOSE SERPL-MCNC: 149 MG/DL (ref 65–140)
GLUCOSE SERPL-MCNC: 170 MG/DL (ref 65–140)
GLUCOSE SERPL-MCNC: 249 MG/DL (ref 65–140)
GLUCOSE SERPL-MCNC: 330 MG/DL (ref 65–140)

## 2021-03-01 PROCEDURE — 99024 POSTOP FOLLOW-UP VISIT: CPT | Performed by: PODIATRIST

## 2021-03-01 PROCEDURE — 82948 REAGENT STRIP/BLOOD GLUCOSE: CPT

## 2021-03-01 PROCEDURE — 99221 1ST HOSP IP/OBS SF/LOW 40: CPT | Performed by: PLASTIC SURGERY

## 2021-03-01 PROCEDURE — 0KBV0ZZ EXCISION OF RIGHT FOOT MUSCLE, OPEN APPROACH: ICD-10-PCS | Performed by: PLASTIC SURGERY

## 2021-03-01 PROCEDURE — 11044 DBRDMT BONE 1ST 20 SQ CM/<: CPT | Performed by: PLASTIC SURGERY

## 2021-03-01 RX ORDER — SODIUM CHLORIDE, SODIUM LACTATE, POTASSIUM CHLORIDE, CALCIUM CHLORIDE 600; 310; 30; 20 MG/100ML; MG/100ML; MG/100ML; MG/100ML
INJECTION, SOLUTION INTRAVENOUS CONTINUOUS PRN
Status: DISCONTINUED | OUTPATIENT
Start: 2021-03-01 | End: 2021-03-01

## 2021-03-01 RX ORDER — PROPOFOL 10 MG/ML
INJECTION, EMULSION INTRAVENOUS CONTINUOUS PRN
Status: DISCONTINUED | OUTPATIENT
Start: 2021-03-01 | End: 2021-03-01

## 2021-03-01 RX ORDER — LIDOCAINE HYDROCHLORIDE AND EPINEPHRINE 10; 10 MG/ML; UG/ML
INJECTION, SOLUTION INFILTRATION; PERINEURAL AS NEEDED
Status: DISCONTINUED | OUTPATIENT
Start: 2021-03-01 | End: 2021-03-01 | Stop reason: HOSPADM

## 2021-03-01 RX ORDER — PROPOFOL 10 MG/ML
INJECTION, EMULSION INTRAVENOUS AS NEEDED
Status: DISCONTINUED | OUTPATIENT
Start: 2021-03-01 | End: 2021-03-01

## 2021-03-01 RX ORDER — ONDANSETRON 2 MG/ML
4 INJECTION INTRAMUSCULAR; INTRAVENOUS ONCE AS NEEDED
Status: DISCONTINUED | OUTPATIENT
Start: 2021-03-01 | End: 2021-03-01 | Stop reason: HOSPADM

## 2021-03-01 RX ORDER — FENTANYL CITRATE 50 UG/ML
INJECTION, SOLUTION INTRAMUSCULAR; INTRAVENOUS AS NEEDED
Status: DISCONTINUED | OUTPATIENT
Start: 2021-03-01 | End: 2021-03-01

## 2021-03-01 RX ORDER — FENTANYL CITRATE/PF 50 MCG/ML
25 SYRINGE (ML) INJECTION
Status: DISCONTINUED | OUTPATIENT
Start: 2021-03-01 | End: 2021-03-01 | Stop reason: HOSPADM

## 2021-03-01 RX ORDER — MAGNESIUM HYDROXIDE 1200 MG/15ML
LIQUID ORAL AS NEEDED
Status: DISCONTINUED | OUTPATIENT
Start: 2021-03-01 | End: 2021-03-01 | Stop reason: HOSPADM

## 2021-03-01 RX ADMIN — OXYCODONE HYDROCHLORIDE 5 MG: 5 TABLET ORAL at 14:28

## 2021-03-01 RX ADMIN — PROPOFOL 10 MG: 10 INJECTION, EMULSION INTRAVENOUS at 10:44

## 2021-03-01 RX ADMIN — PROPOFOL 100 MCG/KG/MIN: 10 INJECTION, EMULSION INTRAVENOUS at 10:42

## 2021-03-01 RX ADMIN — AMOXICILLIN 500 MG: 500 CAPSULE ORAL at 21:54

## 2021-03-01 RX ADMIN — AMOXICILLIN 500 MG: 500 CAPSULE ORAL at 05:36

## 2021-03-01 RX ADMIN — PHENYLEPHRINE HYDROCHLORIDE 20 MCG/MIN: 10 INJECTION INTRAVENOUS at 10:49

## 2021-03-01 RX ADMIN — INSULIN GLARGINE 15 UNITS: 100 INJECTION, SOLUTION SUBCUTANEOUS at 21:54

## 2021-03-01 RX ADMIN — DOXYCYCLINE 100 MG: 100 CAPSULE ORAL at 08:44

## 2021-03-01 RX ADMIN — APIXABAN 5 MG: 5 TABLET, FILM COATED ORAL at 17:29

## 2021-03-01 RX ADMIN — ATORVASTATIN CALCIUM 40 MG: 40 TABLET, FILM COATED ORAL at 08:44

## 2021-03-01 RX ADMIN — SODIUM CHLORIDE, SODIUM LACTATE, POTASSIUM CHLORIDE, AND CALCIUM CHLORIDE: .6; .31; .03; .02 INJECTION, SOLUTION INTRAVENOUS at 10:39

## 2021-03-01 RX ADMIN — PROPOFOL 20 MG: 10 INJECTION, EMULSION INTRAVENOUS at 10:42

## 2021-03-01 RX ADMIN — DOXYCYCLINE 100 MG: 100 CAPSULE ORAL at 21:54

## 2021-03-01 RX ADMIN — LISINOPRIL 40 MG: 20 TABLET ORAL at 08:44

## 2021-03-01 RX ADMIN — METOPROLOL TARTRATE 100 MG: 50 TABLET, FILM COATED ORAL at 21:57

## 2021-03-01 RX ADMIN — FENTANYL CITRATE 25 MCG: 50 INJECTION INTRAMUSCULAR; INTRAVENOUS at 10:46

## 2021-03-01 RX ADMIN — METOPROLOL TARTRATE 100 MG: 50 TABLET, FILM COATED ORAL at 08:44

## 2021-03-01 RX ADMIN — SENNOSIDES, DOCUSATE SODIUM 1 TABLET: 8.6; 5 TABLET ORAL at 21:54

## 2021-03-01 RX ADMIN — AMOXICILLIN 500 MG: 500 CAPSULE ORAL at 14:22

## 2021-03-01 NOTE — ANESTHESIA PREPROCEDURE EVALUATION
Procedure:  DEBRIDEMENT LOWER EXTREMITY ([address] OUT) (Right Leg Lower)  APPLICATION VAC DRESSING EXTREMITY (Right Leg Lower)    Relevant Problems   CARDIO   (+) Atrial fibrillation with RVR (HCC)   (+) Essential hypertension   (+) Hyperlipidemia      ENDO   (+) Type 2 diabetes mellitus with diabetic neuropathy (HCC)      /RENAL   (+) Benign prostatic hyperplasia with lower urinary tract symptoms      NEURO/PSYCH   (+) Type 2 diabetes mellitus with diabetic neuropathy (HCC)      Other   (+) Abscess   (+) Cellulitis of extremity   (+) Right middle lobe pulmonary nodule      EKG 2/22/2021:  Atrial fibrillation with rapid ventricular response  Nonspecific ST abnormality  Abnormal ECG  When compared with ECG of 17-FEB-2021 20:08,  No significant change was found    TTE 11/2018:  LEFT VENTRICLE:  Systolic function was normal  Ejection fraction was estimated to be 55 %  There were no regional wall motion abnormalities  Wall thickness was mildly increased      LEFT ATRIUM:  The atrium was mildly dilated      RIGHT ATRIUM:  The atrium was mildly dilated      IVC, HEPATIC VEINS:  The inferior vena cava was mildly dilated      Lab Results   Component Value Date    WBC 10 07 02/24/2021    HGB 10 9 (L) 02/24/2021    HCT 33 8 (L) 02/24/2021    MCV 94 02/24/2021     (H) 02/24/2021     Lab Results   Component Value Date    SODIUM 139 02/24/2021    K 3 5 02/24/2021     02/24/2021    CO2 25 02/24/2021    BUN 10 02/24/2021    CREATININE 1 03 02/24/2021    GLUC 161 (H) 02/24/2021    CALCIUM 8 7 02/24/2021     Lab Results   Component Value Date    INR 1 36 (H) 02/18/2021    INR 1 47 (H) 02/17/2021    INR 1 48 (H) 02/16/2021    PROTIME 16 7 (H) 02/18/2021    PROTIME 17 8 (H) 02/17/2021    PROTIME 17 9 (H) 02/16/2021     Lab Results   Component Value Date    HGBA1C 7 8 (H) 12/23/2020          Physical Exam    Airway    Mallampati score: II  TM Distance: >3 FB  Neck ROM: full     Dental   No notable dental hx Cardiovascular  Cardiovascular exam normal    Pulmonary  Pulmonary exam normal     Other Findings        Anesthesia Plan  ASA Score- 3     Anesthesia Type- IV sedation with anesthesia with ASA Monitors  Additional Monitors:   Airway Plan:     Comment: Will plan for IV sedation; GA with LMA backup  Plan Factors-    Chart reviewed  EKG reviewed  Existing labs reviewed  Patient summary reviewed  Induction- intravenous  Postoperative Plan-     Informed Consent- Anesthetic plan and risks discussed with patient  I personally reviewed this patient with the CRNA  Discussed and agreed on the Anesthesia Plan with the CRNA  Dinorah Atkinson

## 2021-03-01 NOTE — ANESTHESIA POSTPROCEDURE EVALUATION
Post-Op Assessment Note    CV Status:  Stable  Pain Score: 0    Pain management: adequate     Mental Status:  Alert and awake   Hydration Status:  Euvolemic   PONV Controlled:  Controlled   Airway Patency:  Patent      Post Op Vitals Reviewed: Yes      Staff: Anesthesiologist       Post op glucose: 417    No complications documented      BP  123/75   Temp      Pulse  91   Resp   18   SpO2   100% face mask

## 2021-03-01 NOTE — CASE MANAGEMENT
S/w podiatry for care coordination  Not medically cleared  Went to sx today  Will need rehab  Referrals placed

## 2021-03-01 NOTE — CONSULTS
Consultation - Plastic Surgery   Isidoro Unger  78 y o  male MRN: [id number]  Unit/Bed#: [facility] [place] Encounter: [id number]      Assessment/Plan      Assessment:  1) Deep SSI   2) Post operative wound dehiscence   3) Atrial fibrillation   4) Hx of Diabetes     Patient with unfortunately large defect at this TMA stump that could compromise his recovery  Plan:  I discussed that I felt that before providing formation recommendation, I would like to perform surgical debridement and VAC change under anesthesia to better determine course of action  I explained that based on preliminary information his options are below-the-knee amputation versus limb salvage that will require reconstruction of the soft tissue  I discussed risks including but not limited to: bleeding, infection, hematoma, seroma, wound breakdown, scar, need for further surgery, deformity, pain, numbness, weakness, asymmetry, injury to structures, and medical complications  The patient would like to proceed and therefore we will coordinate a surgery date    A total of 40 minutes of face-to-face time was spent in this encounter, of which >50% was spent in counseling  Leslie Lezama MD   [facility] Reconstructive Surgery   [address]   [address]   [address]   Office: [phone]            History of Present Illness   Physician Requesting Consult: Britney Pacheco DPM  Reason for Consult / Principal Problem: right foot wound  Hx and PE limited by:   HPI: Isidoro Unger  is a 78y o  year old male with a history of diabetes, atrial fibrillation on anticoagulation , right clubfoot who presents elective right foot fusion and reconstruction with external fixator application on 7/70/9044  Patient did well initially, however unfortunately and postoperative 2   Presented with surgical site infection and sepsis the require hospital readmission an emergent surgery on February 17 with hematoma evacuation, fasciotomy for compartment syndrome as well as removal of the external fixator  Patient's symptoms progressed and fortunately was able to salvage his reconstruction and underwent eventual removal of the nail and a TMA amputation on 2/22/2021  Patient has been left with large secondary defect with exposure of bone for which I was asked to assist with evaluation and closure of this in order to salvage his recent TMA stump      Inpatient consult to Plastic Surgery  Consult performed by: Loren Austin MD  Consult ordered by: Diane Ordonez DPM          Review of Systems    Historical Information   Past Medical History:   Diagnosis Date    Arthritis     Atrial fibrillation [facility])     Diagnosed 11/2018    Benign prostate hyperplasia 04/2002    Cellulitis     right lower leg     Colon polyp 2006    Diabetes mellitus ([address] )     Hearing aid worn     bilat    Hearing loss     90% loss left ear and 40% right ear    History of clubfoot     "since birth"    History of pneumonia     History of TIA (transient ischemic attack) 04/25/2017 11/30/18 pt denies    Hyperlipidemia 5/15/2014    Hypertension     Infectious viral hepatitis     "cant remember type"    Irregular heart beat     Neuropathy     both feet    Osteomyelitis ([address] )     right great toe    Right middle lobe pulmonary nodule 11/6/2018    Seasickness     Teeth missing     Type 2 diabetes mellitus with diabetic neuropathy ([address] ) 11/6/2018    Wears glasses     reading    Wound, open     bottom of right foot     Past Surgical History:   Procedure Laterality Date    BUNIONECTOMY Right 12/4/2018    Procedure: 5TH METATARSAL BONE PARTIAL RESECTION, FULL THICKNESS DEBRIDEMENT OF DIABETIC ULCER;  Surgeon: Venancio Liriano DPM;  Location: [facility] OR;  Service: Podiatry    CLUB FOOT RELEASE Bilateral     COLONOSCOPY      EXTERNAL FIXATOR APPLICATION Right 2/83/1166    Procedure: Application multiplane external fixation;  Surgeon: Silvia Palomino DPM;  [facility];  Service: Podiatry    FOOT HARDWARE REMOVAL Right 2021    Procedure: REMOVAL EXTERNAL FIXATOR;  Surgeon: Gracie Marrero DPM;  [facility];  Service: Podiatry    INCISION AND DRAINAGE OF WOUND Right 2021    Procedure: INCISION AND DRAINAGE (I&D) EXTREMITY;  Surgeon: Gracie Marrero DPM;  [facility];  Service: Podiatry    [facility] Right 2021    Procedure: AMPUTATION TRANSMETATARSAL (TMA), REMOVAL NAIL IM T2 ICF HARDWARE;  Surgeon: Gracie Marrero DPM;  [facility];  Service: Podiatry    [address] Right 2021    Procedure: foot ARTHRODESIS/FUSION;  Surgeon: Gracie Marrero DPM;  [facility];  Service: Podiatry    CO LENGTH/SHORT LEG/ANKL TENDON,SINGLE Right 2021    Procedure: LENGTHEN TIBIAL TENDON, TRANS HEEL CORD;  Surgeon: Gracie Marrero DPM;  [facility];  Service: Podiatry    CO PART [facility] BONE METATARSAL HEAD,EA Right 2020    Procedure: EXCISION EXOSTOSIS;  Surgeon: Gracie Marrero DPM;  [facility];  Service: Timmy Mccollum WND EXTEN/COMPLIC Right     Procedure: PRIMARY DELAYED CLOSURE;  Surgeon: Gracie Marrero DPM;  [facility];  Service: Podiatry    TOE AMPUTATION Right     partial great toe    VASECTOMY       Social History   Social History     Substance and Sexual Activity   Alcohol Use Yes    Frequency: 2-4 times a month    Drinks per session: 1 or 2    Comment: socially     Social History     Substance and Sexual Activity   Drug Use Not Currently     E-Cigarette/Vaping    E-Cigarette Use Never User      E-Cigarette/Vaping Substances    Nicotine No     THC No     CBD No      Social History     Tobacco Use   Smoking Status Former Smoker    Types: Cigarettes    Quit date: 1988    Years since quittin 3   Smokeless Tobacco Former User     Family History: non-contributory    Meds/Allergies all current active meds have been reviewed    Allergies   Allergen Reactions    Simvastatin Myalgia    Itraconazole Other (See Comments)     Pt denies knowledge of allergy           Objective     Intake/Output Summary (Last 24 hours) at 3/1/2021 1034  Last data filed at 3/1/2021 0900  Gross per 24 hour   Intake 720 ml   Output 1765 ml   Net -1045 ml       Invasive Devices     Peripheral Intravenous Line            Peripheral IV 02/26/21 Left Antecubital 2 days    Peripheral IV 02/28/21 Dorsal (posterior); Right;Proximal;Medial Forearm 1 day          Drain            Negative Pressure Wound Therapy (V A C ) Foot 5 days                Physical Exam  Constitutional:       Appearance: Normal appearance  HENT:      Head: Normocephalic and atraumatic  Nose: Nose normal    Eyes:      Extraocular Movements: Extraocular movements intact  Pupils: Pupils are equal, round, and reactive to light  Cardiovascular:      Rate and Rhythm: Rhythm irregular  Pulses:           Dorsalis pedis pulses are 2+ on the right side  Posterior tibial pulses are 2+ on the right side  Pulmonary:      Effort: Pulmonary effort is normal    Musculoskeletal: Normal range of motion  Feet:    Neurological:      Mental Status: He is alert  Sensory: Sensory deficit present     Psychiatric:         Mood and Affect: Mood normal                Lab Results:   Lab Results   Component Value Date    WBC 10 07 02/24/2021    HGB 10 9 (L) 02/24/2021    HCT 33 8 (L) 02/24/2021    MCV 94 02/24/2021     (H) 02/24/2021      Lab Results   Component Value Date/Time    TISSUECULT No growth 12/04/2018 08:22 AM    WOUNDCULT 3+ Growth of Enterobacter cloacae complex (A) 02/17/2021 04:36 PM    WOUNDCULT 1+ Growth of  02/17/2021 04:36 PM     Imaging Studies: reviewed  EKG, Pathology, and Other Studies:   Lab Results   Component Value Date/Time    [facility]  02/22/2021 03:29 PM      Transmetatarsal, Right (TMA), Right TMA:  -Gangrenous ulceration of right foot  -Underlying necrotic bone  -Negative for malignancy

## 2021-03-01 NOTE — PLAN OF CARE
Problem: Potential for Falls  Goal: Patient will remain free of falls  Description: INTERVENTIONS:  - Assess patient frequently for physical needs  -  Identify cognitive and physical deficits and behaviors that affect risk of falls    -  [place] fall precautions as indicated by assessment   - Educate patient/family on patient safety including physical limitations  - Instruct patient to call for assistance with activity based on assessment  - Modify environment to reduce risk of injury  - Consider OT/PT consult to assist with strengthening/mobility  Outcome: Progressing     Problem: Prexisting or High Potential for Compromised Skin Integrity  Goal: Skin integrity is maintained or improved  Description: INTERVENTIONS:  - Identify patients at risk for skin breakdown  - Assess and monitor skin integrity  - Assess and monitor nutrition and hydration status  - Monitor labs   - Assess for incontinence   - Turn and reposition patient  - Assist with mobility/ambulation  - Relieve pressure over bony prominences  - Avoid friction and shearing  - Provide appropriate hygiene as needed including keeping skin clean and dry  - Evaluate need for skin moisturizer/barrier cream  - Collaborate with interdisciplinary team   - Patient/family teaching  - Consider wound care consult   Outcome: Progressing     Problem: PAIN - ADULT  Goal: Verbalizes/displays adequate comfort level or baseline comfort level  Description: Interventions:  - Encourage patient to monitor pain and request assistance  - Assess pain using appropriate pain scale  - Administer analgesics based on type and severity of pain and evaluate response  - Implement non-pharmacological measures as appropriate and evaluate response  - Consider cultural and social influences on pain and pain management  - Notify physician/advanced practitioner if interventions unsuccessful or patient reports new pain  Outcome: Progressing     Problem: INFECTION - ADULT  Goal: Absence or prevention of progression during hospitalization  Description: INTERVENTIONS:  - Assess and monitor for signs and symptoms of infection  - Monitor lab/diagnostic results  - Monitor all insertion sites, i e  indwelling lines, tubes, and drains  - Monitor endotracheal if appropriate and nasal secretions for changes in amount and color  - [place] appropriate cooling/warming therapies per order  - Administer medications as ordered  - Instruct and encourage patient and family to use good hand hygiene technique  - Identify and instruct in appropriate isolation precautions for identified infection/condition  Outcome: Progressing     Problem: SAFETY ADULT  Goal: Patient will remain free of falls  Description: INTERVENTIONS:  - Assess patient frequently for physical needs  -  Identify cognitive and physical deficits and behaviors that affect risk of falls    -  [place] fall precautions as indicated by assessment   - Educate patient/family on patient safety including physical limitations  - Instruct patient to call for assistance with activity based on assessment  - Modify environment to reduce risk of injury  - Consider OT/PT consult to assist with strengthening/mobility  Outcome: Progressing  Goal: Maintain or return to baseline ADL function  Description: INTERVENTIONS:  -  Assess patient's ability to carry out ADLs; assess patient's baseline for ADL function and identify physical deficits which impact ability to perform ADLs (bathing, care of mouth/teeth, toileting, grooming, dressing, etc )  - Assess/evaluate cause of self-care deficits   - Assess range of motion  - Assess patient's mobility; develop plan if impaired  - Assess patient's need for assistive devices and provide as appropriate  - Encourage maximum independence but intervene and supervise when necessary  - Involve family in performance of ADLs  - Assess for home care needs following discharge   - Consider OT consult to assist with ADL evaluation and planning for discharge  - Provide patient education as appropriate  Outcome: Progressing  Goal: Maintain or return mobility status to optimal level  Description: INTERVENTIONS:  - Assess patient's baseline mobility status (ambulation, transfers, stairs, etc )    - Identify cognitive and physical deficits and behaviors that affect mobility  - Identify mobility aids required to assist with transfers and/or ambulation (gait belt, sit-to-stand, lift, walker, cane, etc )  - [place] fall precautions as indicated by assessment  - Record patient progress and toleration of activity level on Mobility SBAR; progress patient to next Phase/Stage  - Instruct patient to call for assistance with activity based on assessment  - Consider rehabilitation consult to assist with strengthening/weightbearing, etc   Outcome: Progressing     Problem: DISCHARGE PLANNING  Goal: Discharge to home or other facility with appropriate resources  Description: INTERVENTIONS:  - Identify barriers to discharge w/patient and caregiver  - Arrange for needed discharge resources and transportation as appropriate  - Identify discharge learning needs (meds, wound care, etc )  - Arrange for interpretive services to assist at discharge as needed  - Refer to Case Management Department for coordinating discharge planning if the patient needs post-hospital services based on physician/advanced practitioner order or complex needs related to functional status, cognitive ability, or social support system  Outcome: Progressing

## 2021-03-01 NOTE — OP NOTE
OPERATIVE REPORT  PATIENT NAME: Shabbir Rico  :  1950  MRN: [id number]  Pt Location: [facility] OR ROOM 06    SURGERY DATE: 3/1/2021    Surgeon(s) and Role:     * Elvie Bustamante MD - Primary    Preop Diagnosis:  Deep disruption or dehiscence of operation wound, initial encounter [T81 32XA]    Post-Op Diagnosis Codes:     * Deep disruption or dehiscence of operation wound, initial encounter [T81 32XA]    Procedure(s) (LRB):  DEBRIDEMENT LOWER EXTREMITY ([address] OUT) (Right)  APPLICATION VAC DRESSING EXTREMITY (Right)    Specimen(s):  * No specimens in log *    Estimated Blood Loss:   Minimal    Drains:  Negative Pressure Wound Therapy (V A C ) Foot (Active)   Unit Type wound vac 21 1127   Black foam- # applied 1 21 1102   Cycle Continuous 21 1200   Target Pressure (mmHg) 125 21 1200   Canister Changed Yes 21 1102   Dressing Status Clean;Dry; Intact 21 1200   Black foam- # removed 1 21 1047   Output (mL) 50 mL 21 1047   Number of days: 6       Anesthesia Type:   General    Operative Indications:  Deep disruption or dehiscence of operation wound, initial encounter [T81 32XA]      Operative Findings:  Right TMA stump with intact center flap but tissue loss medially and laterally with bone exposure   Right medial wound 9 x 4  X 1 cm with central bone exposure 2 x 2 cm  Right lateral wound 12 x 5 x 1 cm with central bone exposure 2 5 x 2 cm     Complications:   None    Procedure and Technique:  The patient was met in the preoperative holding area and all the last minute questions were properly answered and addressed  Site of surgery was marked and verified he was then taken to the operative theater placed in supine position  After smooth anesthesia was then induced the right lower extremity was then prepped and draped in usual sterile fashion a time-out was then performed  Procedure started by 1st evaluating the wound    The central flap of the stump was viable with suture line intact however there was defect along the medial and lateral aspect of the stump with some evidence of fat necrosis and bone exposure centrally  The bone is very mobile with some areas of desiccation  Excision surgical debridement was then performed using the 10 skin blade scalpel and curette along all the surface of the wound until healthy bleeding fashion as well bone centrally  The medial suture line is were removed given the were completed disrupted  The wound was then irrigated with 2 L of normal saline hemostasis was then performed  Application of negative pressure therapy was then applied with 1 black sponge bridge across both defects  Dry dressing was applied the conclusion of the case  I was present for the entire procedure and A qualified resident physician was not available    Patient Disposition:  PACU , hemodynamically stable and patient will return to OR for planned surgery as a staged procedure   Patient will require free tissue transfer for coverage of the secondary defect and bone exposure    For limb salvage    SIGNATURE: Hair Delgado MD  DATE: March 1, 2021  TIME: 12:43 PM

## 2021-03-01 NOTE — OCCUPATIONAL THERAPY NOTE
Occupational Therapy Treatment Note:       03/01/21 1106   Note Type   Cancel Reasons Patient to operating room   Ladonna Stewart

## 2021-03-01 NOTE — PROGRESS NOTES
Podiatry - Progress Note  Patient: Carlos Gonzalez  78 y o  male   MRN: [id number]  PCP: Juany Wick MD  Unit/Bed#: [facility] Encounter: [id number]  Date Of Visit: 21    ASSESSMENT:    Carlos Gonzalez  is a 78 y o  male with:    1  Right foot hematoma, surgical site infection, ischemia  - S/p exfix removal, I&D, fasciotomy (DOS 21)  - s/p TMA of left foot   2  Congenital talipes equinovarus right foot             - s/p midfoot arthrodesis, exfix application (DOS 3/94/72)  3  T2Dm, last A1C 7 8 %  4  HTN  5  Altered mental status  6  Constipation  7  atrial fibrillation  8  Sepsis on admission - resolved      PLAN:    · Patient to the OR today with Plastic Sx for surgical debridement and wound vac change  Plastic Sx planning free flap at this time  · Continue Wound Vac therapy  Vac working well with no leaks detected and running at 125 mmHg  · F/U CTA  · Continue elevation of b/l LE when non-ambulatory  · NWB to RLE  · Appreciate consulting services for recommendations and management  SUBJECTIVE:     The patient was seen, evaluated, and assessed at bedside today  The patient was awake, alert, and in no acute distress  No acute events overnight  Patient denies N/V/F/chills/SOB/CP  OBJECTIVE:     Vitals:   BP (P) 123/75   Pulse (P) 92   Temp (!) (P) 97 3 °F (36 3 °C)   Resp (P) 16   Ht 6' 4" (1 93 m)   Wt 109 kg (240 lb 15 4 oz)   SpO2 (P) 99%   BMI 29 33 kg/m²     Temp (24hrs), Av 2 °F (36 8 °C), Min:97 3 °F (36 3 °C), Max:98 9 °F (37 2 °C)      Physical Exam:     General:  Alert, cooperative, and in no distress  Lower extremity exam:  Cardiovascular status at baseline  Neurological status at baseline  Musculoskeletal status at baseline  No calf tenderness noted  Post surgical dressing are clean, dry, and intact  [address] [place] [address] Street is running with no leaks detected      Additional Data:     Labs:    Results from last 7 days   Lab Units 21  0548   WBC Thousand/uL 10 07   HEMOGLOBIN g/dL 10 9*   HEMATOCRIT % 33 8*   PLATELETS Thousands/uL 473*     Results from last 7 days   Lab Units 02/24/21  0548   POTASSIUM mmol/L 3 5   CHLORIDE mmol/L 107   CO2 mmol/L 25   BUN mg/dL 10   CREATININE mg/dL 1 03   CALCIUM mg/dL 8 7           * I Have Reviewed All Lab Data Listed Above  Recent Cultures (last 7 days):               Imaging: I have personally reviewed pertinent films in PACS  EKG, Pathology, and Other Studies: I have personally reviewed pertinent reports  ** Please Note: Portions of the record may have been created with voice recognition software  Occasional wrong word or "sound a like" substitutions may have occurred due to the inherent limitations of voice recognition software  Read the chart carefully and recognize, using context, where substitutions have occurred   **  '

## 2021-03-02 LAB
GLUCOSE SERPL-MCNC: 126 MG/DL (ref 65–140)
GLUCOSE SERPL-MCNC: 164 MG/DL (ref 65–140)
GLUCOSE SERPL-MCNC: 322 MG/DL (ref 65–140)

## 2021-03-02 PROCEDURE — 99024 POSTOP FOLLOW-UP VISIT: CPT | Performed by: PODIATRIST

## 2021-03-02 PROCEDURE — 82948 REAGENT STRIP/BLOOD GLUCOSE: CPT

## 2021-03-02 PROCEDURE — 97116 GAIT TRAINING THERAPY: CPT

## 2021-03-02 RX ADMIN — ACETAMINOPHEN 650 MG: 325 TABLET ORAL at 05:31

## 2021-03-02 RX ADMIN — LISINOPRIL 40 MG: 20 TABLET ORAL at 08:43

## 2021-03-02 RX ADMIN — ATORVASTATIN CALCIUM 40 MG: 40 TABLET, FILM COATED ORAL at 08:44

## 2021-03-02 RX ADMIN — DOXYCYCLINE 100 MG: 100 CAPSULE ORAL at 08:43

## 2021-03-02 RX ADMIN — AMOXICILLIN 500 MG: 500 CAPSULE ORAL at 05:26

## 2021-03-02 RX ADMIN — APIXABAN 5 MG: 5 TABLET, FILM COATED ORAL at 08:44

## 2021-03-02 RX ADMIN — APIXABAN 5 MG: 5 TABLET, FILM COATED ORAL at 17:33

## 2021-03-02 RX ADMIN — DOXYCYCLINE 100 MG: 100 CAPSULE ORAL at 21:41

## 2021-03-02 RX ADMIN — AMOXICILLIN 500 MG: 500 CAPSULE ORAL at 12:30

## 2021-03-02 RX ADMIN — METOPROLOL TARTRATE 100 MG: 50 TABLET, FILM COATED ORAL at 08:43

## 2021-03-02 RX ADMIN — AMOXICILLIN 500 MG: 500 CAPSULE ORAL at 21:42

## 2021-03-02 RX ADMIN — INSULIN GLARGINE 15 UNITS: 100 INJECTION, SOLUTION SUBCUTANEOUS at 21:42

## 2021-03-02 NOTE — PLAN OF CARE
Problem: PHYSICAL THERAPY ADULT  Goal: Performs mobility at highest level of function for planned discharge setting  See evaluation for individualized goals  Description: Treatment/Interventions: Functional transfer training, LE strengthening/ROM, Elevations, Therapeutic exercise, Endurance training, Bed mobility, Gait training, OT  Equipment Recommended: Walker(at this time)       See flowsheet documentation for full assessment, interventions and recommendations  Outcome: Progressing  Note: Prognosis: Good  Problem List: Decreased strength, Decreased endurance, Impaired balance, Decreased mobility, Decreased coordination, Impaired judgement, Decreased safety awareness, Orthopedic restrictions  Assessment: Pt continues to require supervision/min A for all transfers & mobiltiy to ensure safety & line management  Pt continues to ambulate with appropriate pace, but benefitted form RW adjustment to improve UE support  Continues to be limited by fatigue with increased effort to ambulate, but does maintain NWB RLE throughout session  Plan to continue ambulation to maximize endurance & trial curb steps to allow access to community  Pt may be able to discharge home at discharge pending support & requisite distances to safely negotiate home & community  Barriers to Discharge: Inaccessible home environment  Barriers to Discharge Comments: At conclusion of PT session pt returned BTB with phone and call bell within reach  Pt denies any further questions at this time  The patient's AM-PAC Basic Mobility Inpatient Short Form Raw Score is 14, Standardized Score is 35 55  A standardized score less than 42 9 suggests the patient may benefit from discharge to post-acute rehabilitation services  However, anticipate pt will progress quickly to allow for d/c home as he has good social support and an accessible home  Please also refer to the recommendation of the Physical Therapist for safe discharge planning   Recommending home with social support and HHPT upon d/c    PT Discharge Recommendation: Post-Acute Rehabilitation Services(vs home)     PT - OK to Discharge: Yes    See flowsheet documentation for full assessment

## 2021-03-02 NOTE — PHYSICAL THERAPY NOTE
Physical Therapy Progress Note     03/02/21 1440   PT Last Visit   PT Visit Date 03/02/21   Note Type   Note Type Treatment   Pain Assessment   Pain Assessment Tool Pain Assessment not indicated - pt denies pain   Restrictions/Precautions   RLE Weight Bearing Per Order NWB   Other Precautions Pain; Fall Risk;Multiple lines;WBS  (wound vac)   Subjective   Subjective Pt encountered seated in recliner, pleasant and agreeable to treatment  Reports feeling more tired today compared to previous session, otherwise offers no complaints   Bed Mobility   Sit to Supine 5  Supervision   Additional items Assist x 1   Transfers   Sit to Stand 5  Supervision   Additional items Assist x 1; Armrests; Increased time required   Stand to Sit 5  Supervision   Additional items Assist x 1; Armrests; Increased time required;Verbal cues  (for controlled descent)   Ambulation/Elevation   Gait pattern Excessively slow; Short stride; Inconsistent rai;Decreased foot clearance;Poor UE support  (3 point gait pattern)   Gait Assistance 4  Minimal assist   Additional items Assist x 1   Assistive Device Rolling walker   Distance 45' x 2   Balance   Static Sitting Good   Static Standing Fair   Ambulatory Poor +   Endurance Deficit   Endurance Deficit Yes   Endurance Deficit Description fatigue, tachycardia   Activity Tolerance   Activity Tolerance Patient tolerated treatment well;Patient limited by fatigue   Nurse [address] [place] [address]  , RN   Assessment   Prognosis Good   Problem List Decreased strength;Decreased endurance; Impaired balance;Decreased mobility; Decreased coordination; Impaired judgement;Decreased safety awareness;Orthopedic restrictions   Assessment Pt continues to require supervision/min A for all transfers & mobiltiy to ensure safety & line management  Pt continues to ambulate with appropriate pace, but benefitted form RW adjustment to improve UE support    Continues to be limited by fatigue with increased effort to ambulate, but does maintain NWB RLE throughout session  Plan to continue ambulation to maximize endurance & trial curb steps to allow access to community  Pt may be able to discharge home at discharge pending support & requisite distances to safely negotiate home & community  Barriers to Discharge Inaccessible home environment   Goals   Patient Goals to keep getting better & be able to leave hospital   STG Expiration Date 03/05/21   PT Treatment Day 4   Plan   Treatment/Interventions Functional transfer training;LE strengthening/ROM; Therapeutic exercise;Elevations; Endurance training;Patient/family training;Equipment eval/education; Bed mobility;Gait training   Progress Progressing toward goals   PT Frequency Other (Comment)  (3-6x/week)   Recommendation   PT Discharge Recommendation Post-Acute Rehabilitation Services  (vs home)   Equipment Recommended [facility] Recommended Wheeled walker     Maradha [place], [place]

## 2021-03-02 NOTE — PLAN OF CARE
Problem: Potential for Falls  Goal: Patient will remain free of falls  Description: INTERVENTIONS:  - Assess patient frequently for physical needs  -  Identify cognitive and physical deficits and behaviors that affect risk of falls    -  [place] fall precautions as indicated by assessment   - Educate patient/family on patient safety including physical limitations  - Instruct patient to call for assistance with activity based on assessment  - Modify environment to reduce risk of injury  - Consider OT/PT consult to assist with strengthening/mobility  Outcome: Progressing     Problem: Prexisting or High Potential for Compromised Skin Integrity  Goal: Skin integrity is maintained or improved  Description: INTERVENTIONS:  - Identify patients at risk for skin breakdown  - Assess and monitor skin integrity  - Assess and monitor nutrition and hydration status  - Monitor labs   - Assess for incontinence   - Turn and reposition patient  - Assist with mobility/ambulation  - Relieve pressure over bony prominences  - Avoid friction and shearing  - Provide appropriate hygiene as needed including keeping skin clean and dry  - Evaluate need for skin moisturizer/barrier cream  - Collaborate with interdisciplinary team   - Patient/family teaching  - Consider wound care consult   Outcome: Progressing     Problem: PAIN - ADULT  Goal: Verbalizes/displays adequate comfort level or baseline comfort level  Description: Interventions:  - Encourage patient to monitor pain and request assistance  - Assess pain using appropriate pain scale  - Administer analgesics based on type and severity of pain and evaluate response  - Implement non-pharmacological measures as appropriate and evaluate response  - Consider cultural and social influences on pain and pain management  - Notify physician/advanced practitioner if interventions unsuccessful or patient reports new pain  Outcome: Progressing     Problem: INFECTION - ADULT  Goal: Absence or prevention of progression during hospitalization  Description: INTERVENTIONS:  - Assess and monitor for signs and symptoms of infection  - Monitor lab/diagnostic results  - Monitor all insertion sites, i e  indwelling lines, tubes, and drains  - Monitor endotracheal if appropriate and nasal secretions for changes in amount and color  - [place] appropriate cooling/warming therapies per order  - Administer medications as ordered  - Instruct and encourage patient and family to use good hand hygiene technique  - Identify and instruct in appropriate isolation precautions for identified infection/condition  Outcome: Progressing     Problem: SAFETY ADULT  Goal: Patient will remain free of falls  Description: INTERVENTIONS:  - Assess patient frequently for physical needs  -  Identify cognitive and physical deficits and behaviors that affect risk of falls    -  [place] fall precautions as indicated by assessment   - Educate patient/family on patient safety including physical limitations  - Instruct patient to call for assistance with activity based on assessment  - Modify environment to reduce risk of injury  - Consider OT/PT consult to assist with strengthening/mobility  Outcome: Progressing  Goal: Maintain or return to baseline ADL function  Description: INTERVENTIONS:  -  Assess patient's ability to carry out ADLs; assess patient's baseline for ADL function and identify physical deficits which impact ability to perform ADLs (bathing, care of mouth/teeth, toileting, grooming, dressing, etc )  - Assess/evaluate cause of self-care deficits   - Assess range of motion  - Assess patient's mobility; develop plan if impaired  - Assess patient's need for assistive devices and provide as appropriate  - Encourage maximum independence but intervene and supervise when necessary  - Involve family in performance of ADLs  - Assess for home care needs following discharge   - Consider OT consult to assist with ADL evaluation and planning for discharge  - Provide patient education as appropriate  Outcome: Progressing  Goal: Maintain or return mobility status to optimal level  Description: INTERVENTIONS:  - Assess patient's baseline mobility status (ambulation, transfers, stairs, etc )    - Identify cognitive and physical deficits and behaviors that affect mobility  - Identify mobility aids required to assist with transfers and/or ambulation (gait belt, sit-to-stand, lift, walker, cane, etc )  - [place] fall precautions as indicated by assessment  - Record patient progress and toleration of activity level on Mobility SBAR; progress patient to next Phase/Stage  - Instruct patient to call for assistance with activity based on assessment  - Consider rehabilitation consult to assist with strengthening/weightbearing, etc   Outcome: Progressing     Problem: DISCHARGE PLANNING  Goal: Discharge to home or other facility with appropriate resources  Description: INTERVENTIONS:  - Identify barriers to discharge w/patient and caregiver  - Arrange for needed discharge resources and transportation as appropriate  - Identify discharge learning needs (meds, wound care, etc )  - Arrange for interpretive services to assist at discharge as needed  - Refer to Case Management Department for coordinating discharge planning if the patient needs post-hospital services based on physician/advanced practitioner order or complex needs related to functional status, cognitive ability, or social support system  Outcome: Progressing

## 2021-03-03 LAB
GLUCOSE SERPL-MCNC: 134 MG/DL (ref 65–140)
GLUCOSE SERPL-MCNC: 134 MG/DL (ref 65–140)
GLUCOSE SERPL-MCNC: 182 MG/DL (ref 65–140)
GLUCOSE SERPL-MCNC: 206 MG/DL (ref 65–140)
GLUCOSE SERPL-MCNC: 221 MG/DL (ref 65–140)
GLUCOSE SERPL-MCNC: 221 MG/DL (ref 65–140)

## 2021-03-03 PROCEDURE — NC001 PR NO CHARGE: Performed by: PODIATRIST

## 2021-03-03 PROCEDURE — 99232 SBSQ HOSP IP/OBS MODERATE 35: CPT | Performed by: INTERNAL MEDICINE

## 2021-03-03 PROCEDURE — 99231 SBSQ HOSP IP/OBS SF/LOW 25: CPT | Performed by: PLASTIC SURGERY

## 2021-03-03 PROCEDURE — 82948 REAGENT STRIP/BLOOD GLUCOSE: CPT

## 2021-03-03 RX ORDER — CHLORHEXIDINE GLUCONATE 0.12 MG/ML
15 RINSE ORAL ONCE
Status: CANCELLED | OUTPATIENT
Start: 2021-03-03 | End: 2021-03-03

## 2021-03-03 RX ADMIN — APIXABAN 5 MG: 5 TABLET, FILM COATED ORAL at 17:40

## 2021-03-03 RX ADMIN — METOPROLOL TARTRATE 100 MG: 50 TABLET, FILM COATED ORAL at 08:34

## 2021-03-03 RX ADMIN — AMOXICILLIN 500 MG: 500 CAPSULE ORAL at 06:13

## 2021-03-03 RX ADMIN — ATORVASTATIN CALCIUM 40 MG: 40 TABLET, FILM COATED ORAL at 08:34

## 2021-03-03 RX ADMIN — APIXABAN 5 MG: 5 TABLET, FILM COATED ORAL at 08:34

## 2021-03-03 RX ADMIN — AMOXICILLIN 500 MG: 500 CAPSULE ORAL at 16:21

## 2021-03-03 RX ADMIN — DOXYCYCLINE 100 MG: 100 CAPSULE ORAL at 08:34

## 2021-03-03 RX ADMIN — DOXYCYCLINE 100 MG: 100 CAPSULE ORAL at 21:42

## 2021-03-03 RX ADMIN — AMOXICILLIN 500 MG: 500 CAPSULE ORAL at 21:42

## 2021-03-03 RX ADMIN — METOPROLOL TARTRATE 100 MG: 50 TABLET, FILM COATED ORAL at 21:42

## 2021-03-03 RX ADMIN — LISINOPRIL 40 MG: 20 TABLET ORAL at 08:35

## 2021-03-03 RX ADMIN — INSULIN GLARGINE 15 UNITS: 100 INJECTION, SOLUTION SUBCUTANEOUS at 21:49

## 2021-03-03 NOTE — PROGRESS NOTES
Plastic Surgery Attending     Pt seen and examined  No major events overnight  /82   Pulse 94   Temp 98 °F (36 7 °C)   Resp 18   Ht 6' 4" (1 93 m)   Wt 109 kg (240 lb 15 4 oz)   SpO2 97%   BMI 29 33 kg/m²     RLE: VAC in place  no erythema on distal leg     A:   1) Deep SSI   2) Post operative wound dehiscence   3) Atrial fibrillation   4) Hx of Diabetes     I discussed with Mr Marciano Slade his options at this point with BKA vs limb salvage  Patient would like to do everything possible to preserve his TMA, which in my medical opinion is a very reasonable request  Patient has TMA with dehiscence and significant areas of bone exposure and lack of soft tissue coverage  I discussed our plan will be to perform free tissue transfer with fasciocutaneous flap for coverage  I also discussed that Dr Lili Malagon would also like to stabilize the affected bone segments exposed with percutaneous fixation  All risks, benefits, and options, including but not limited to, pain, scarring, bleeding, infection, asymmetry, contour deformity, damage to adjacent nerves, vessels, and organs, hematoma, seroma, paresthesias, anesthesia (temporary versus permanent), skin necrosis, fat necrosis, flap necrosis, wound dehiscence with need for healing by secondary intention and postoperative dressing changes,  hypertrophic and/or keloid scar formation, deep venous thrombosis, pulmonary embolism, death, recurrence, and need for revision and/or reoperation were fully explained to the patient  I discussed with the patient that surgery is scheduled for April 7th, 2021 tentatively awaiting OR confirmation  From plastic surgery standpoint patient is ok to transition to rehab facility of home with PT/OT with continue VAC changes as outpatient  I would see the patient in 2 weeks for pre-op appointment and discussion of the surgery in conjunction with his family  He manifest understanding and agreeable with this plan       Yu Rouse [address]   [address]   [place], [place]    Office: [phone]

## 2021-03-03 NOTE — CASE MANAGEMENT
S/w pt & wife & both want [facility] as first choice for rehab  They will not have beds till probably next week  Per podiatry, pt might be ready Friday or over w/e  TC to wife for back up choice & she would want [facility]  Ecin message sent to [facility] to inquire if they can accept

## 2021-03-03 NOTE — PROGRESS NOTES
Podiatry - Progress Note  Patient: Nano Cope  78 y o  male   MRN: [id number]  PCP: Chari Castro MD  Unit/Bed#: [facility] 092-49 Encounter: [id number]  Date Of Visit: 21    ASSESSMENT:    Nano Cope  is a 78 y o  male with:    1  Right foot hematoma, surgical site infection, ischemia  - S/p exfix removal, I&D, fasciotomy (DOS 21)  - s/p TMA of left foot   - s/p debridement and vac application per Plastics 3/1/21  2  Congenital talipes equinovarus right foot             - s/p midfoot arthrodesis, exfix application (DOS 04)  3  T2Dm, last A1C 7 8 %  4  HTN  5  Altered mental status  6  Constipation  7  atrial fibrillation  8  Sepsis on admission - resolved      PLAN:    · Plastic surgery planning free flap at this time  Will coordinate with plastics on surgical timing with plan for concurrent fixation of the medial column by Dr Mari Garciaing  Stable for d/c after vac change tomorrow from plastics standpoint  · Dressing c/d/I, wound vac running at 125 low continuous with good output and without leaks or blockage  · Elevation on green foam wedges or pillows when non-ambulatory  · Appreciate consulting services for recommendations and management  Antibiotics started:  None  Pharmacologic VTE Prophylaxis: Eliquis  Mechanical VTE Prophylaxis: sequential compression device   Weightbearing status: NWB RLE     Disposition:  Patient requires continued stay for surgical intervention    SUBJECTIVE:     The patient was seen, evaluated, and assessed at bedside today  The patient was awake, alert, and in no acute distress  No acute events overnight  The patient reports no complaints at this time  Patient denies N/V/F/chills/SOB/CP        OBJECTIVE:     Vitals:   /89   Pulse 77   Temp 98 3 °F (36 8 °C)   Resp 19   Ht 6' 4" (1 93 m)   Wt 109 kg (240 lb 15 4 oz)   SpO2 97%   BMI 29 33 kg/m²     Temp (24hrs), Av 3 °F (36 8 °C), Min:97 9 °F (36 6 °C), Max:99 1 °F (37 3 °C)      Physical Exam: General: Alert, cooperative and no distress  Lungs: Non labored breathing  Abdomen: Soft, non-tender  Lower extremity exam:  Cardiovascular status at baseline  Neurological status at baseline  Musculoskeletal status at baseline  No calf tenderness noted  Dressing c/d/I, wound vac running at 125 low continuous with good output and without leaks or blockage  Additional Data:     Labs:    Results from last 7 days   Lab Units 02/24/21  0548   WBC Thousand/uL 10 07   HEMOGLOBIN g/dL 10 9*   HEMATOCRIT % 33 8*   PLATELETS Thousands/uL 473*     Results from last 7 days   Lab Units 02/24/21  0548   POTASSIUM mmol/L 3 5   CHLORIDE mmol/L 107   CO2 mmol/L 25   BUN mg/dL 10   CREATININE mg/dL 1 03   CALCIUM mg/dL 8 7           * I Have Reviewed All Lab Data Listed Above  Recent Cultures (last 7 days):               Imaging: I have personally reviewed pertinent films in PACS  EKG, Pathology, and Other Studies: I have personally reviewed pertinent reports  ** Please Note: Portions of the record may have been created with voice recognition software  Occasional wrong word or "sound a like" substitutions may have occurred due to the inherent limitations of voice recognition software  Read the chart carefully and recognize, using context, where substitutions have occurred   **

## 2021-03-03 NOTE — PLAN OF CARE
Problem: Potential for Falls  Goal: Patient will remain free of falls  Description: INTERVENTIONS:  - Assess patient frequently for physical needs  -  Identify cognitive and physical deficits and behaviors that affect risk of falls    -  [place] fall precautions as indicated by assessment   - Educate patient/family on patient safety including physical limitations  - Instruct patient to call for assistance with activity based on assessment  - Modify environment to reduce risk of injury  - Consider OT/PT consult to assist with strengthening/mobility  Outcome: Progressing     Problem: Prexisting or High Potential for Compromised Skin Integrity  Goal: Skin integrity is maintained or improved  Description: INTERVENTIONS:  - Identify patients at risk for skin breakdown  - Assess and monitor skin integrity  - Assess and monitor nutrition and hydration status  - Monitor labs   - Assess for incontinence   - Turn and reposition patient  - Assist with mobility/ambulation  - Relieve pressure over bony prominences  - Avoid friction and shearing  - Provide appropriate hygiene as needed including keeping skin clean and dry  - Evaluate need for skin moisturizer/barrier cream  - Collaborate with interdisciplinary team   - Patient/family teaching  - Consider wound care consult   Outcome: Progressing     Problem: PAIN - ADULT  Goal: Verbalizes/displays adequate comfort level or baseline comfort level  Description: Interventions:  - Encourage patient to monitor pain and request assistance  - Assess pain using appropriate pain scale  - Administer analgesics based on type and severity of pain and evaluate response  - Implement non-pharmacological measures as appropriate and evaluate response  - Consider cultural and social influences on pain and pain management  - Notify physician/advanced practitioner if interventions unsuccessful or patient reports new pain  Outcome: Progressing     Problem: INFECTION - ADULT  Goal: Absence or prevention of progression during hospitalization  Description: INTERVENTIONS:  - Assess and monitor for signs and symptoms of infection  - Monitor lab/diagnostic results  - Monitor all insertion sites, i e  indwelling lines, tubes, and drains  - Monitor endotracheal if appropriate and nasal secretions for changes in amount and color  - [place] appropriate cooling/warming therapies per order  - Administer medications as ordered  - Instruct and encourage patient and family to use good hand hygiene technique  - Identify and instruct in appropriate isolation precautions for identified infection/condition  Outcome: Progressing     Problem: SAFETY ADULT  Goal: Patient will remain free of falls  Description: INTERVENTIONS:  - Assess patient frequently for physical needs  -  Identify cognitive and physical deficits and behaviors that affect risk of falls    -  [place] fall precautions as indicated by assessment   - Educate patient/family on patient safety including physical limitations  - Instruct patient to call for assistance with activity based on assessment  - Modify environment to reduce risk of injury  - Consider OT/PT consult to assist with strengthening/mobility  Outcome: Progressing  Goal: Maintain or return to baseline ADL function  Description: INTERVENTIONS:  -  Assess patient's ability to carry out ADLs; assess patient's baseline for ADL function and identify physical deficits which impact ability to perform ADLs (bathing, care of mouth/teeth, toileting, grooming, dressing, etc )  - Assess/evaluate cause of self-care deficits   - Assess range of motion  - Assess patient's mobility; develop plan if impaired  - Assess patient's need for assistive devices and provide as appropriate  - Encourage maximum independence but intervene and supervise when necessary  - Involve family in performance of ADLs  - Assess for home care needs following discharge   - Consider OT consult to assist with ADL evaluation and planning for discharge  - Provide patient education as appropriate  Outcome: Progressing  Goal: Maintain or return mobility status to optimal level  Description: INTERVENTIONS:  - Assess patient's baseline mobility status (ambulation, transfers, stairs, etc )    - Identify cognitive and physical deficits and behaviors that affect mobility  - Identify mobility aids required to assist with transfers and/or ambulation (gait belt, sit-to-stand, lift, walker, cane, etc )  - [place] fall precautions as indicated by assessment  - Record patient progress and toleration of activity level on Mobility SBAR; progress patient to next Phase/Stage  - Instruct patient to call for assistance with activity based on assessment  - Consider rehabilitation consult to assist with strengthening/weightbearing, etc   Outcome: Progressing     Problem: DISCHARGE PLANNING  Goal: Discharge to home or other facility with appropriate resources  Description: INTERVENTIONS:  - Identify barriers to discharge w/patient and caregiver  - Arrange for needed discharge resources and transportation as appropriate  - Identify discharge learning needs (meds, wound care, etc )  - Arrange for interpretive services to assist at discharge as needed  - Refer to Case Management Department for coordinating discharge planning if the patient needs post-hospital services based on physician/advanced practitioner order or complex needs related to functional status, cognitive ability, or social support system  Outcome: Progressing

## 2021-03-03 NOTE — PROGRESS NOTES
Podiatry - Progress Note  Patient: Preet Castanon  78 y o  male   MRN: [id number]  PCP: Raphael Atkinson MD  Unit/Bed#: [facility] 057-66 Encounter: [id number]  Date Of Visit: 03/03/21    ASSESSMENT:    Preet Castanon  is a 78 y o  male with:    1  Right foot hematoma, surgical site infection, ischemia  - S/p exfix removal, I&D, fasciotomy (DOS 2/17/21)  - s/p TMA of left foot 2/22  - s/p debridement and vac application per Plastics 3/1/21  2  Congenital talipes equinovarus right foot             - s/p midfoot arthrodesis, exfix application (DOS 7/30/34)  3  T2Dm, last A1C 7 8 %  4  HTN  5  Altered mental status  6  Constipation  7  atrial fibrillation  8  Sepsis on admission - resolved    PLAN:    · Plan for OR tomorrow afternoon with Dr Milton Porras for washout and percutaneous fixation of the medial column  Plan discussed with patient, he is amenable to and understanding of the plan  · Tentative plan for limb salvage with free tissue/fasciocutaneous flap by Dr Tora Hodgkin is on 04/07/2021  Patient is stable for discharge from a plastic surgery standpoint with outpatient wound VAC  · Dressing c/d/I, wound vac running at 125 low continuous with good output and without leaks or blockage  · Elevation on green foam wedges or pillows when non-ambulatory  · Appreciate consulting services for recommendations and management  Antibiotics started:  None  Pharmacologic VTE Prophylaxis: Eliquis  Mechanical VTE Prophylaxis: sequential compression device   Weightbearing status: NWB RLE     Disposition:  Patient requires continued stay for surgical intervention    SUBJECTIVE:     The patient was seen, evaluated, and assessed at bedside today  The patient was awake, alert, and in no acute distress  No acute events overnight  The patient reports no complaints this time  Patient denies N/V/F/chills/SOB/CP        OBJECTIVE:     Vitals:   /82   Pulse 94   Temp 98 °F (36 7 °C)   Resp 18   Ht 6' 4" (1 93 m)   Wt 109 kg (240 lb 15 4 oz)   SpO2 97%   BMI 29 33 kg/m²     Temp (24hrs), Av 1 °F (36 7 °C), Min:98 °F (36 7 °C), Max:98 3 °F (36 8 °C)      Physical Exam:     General: Alert, cooperative and no distress  Lungs: Non labored breathing  Abdomen: Soft, non-tender  Lower extremity exam:  Cardiovascular status at baseline  Neurological status at baseline  Musculoskeletal status at baseline  No calf tenderness noted  Dressing c/d/I, wound vac running at 125 low continuous with good output and without leaks or blockage  Additional Data:     Labs: Invalid input(s): LABALBU        * I Have Reviewed All Lab Data Listed Above  Recent Cultures (last 7 days):               Imaging: I have personally reviewed pertinent films in PACS  EKG, Pathology, and Other Studies: I have personally reviewed pertinent reports  ** Please Note: Portions of the record may have been created with voice recognition software  Occasional wrong word or "sound a like" substitutions may have occurred due to the inherent limitations of voice recognition software  Read the chart carefully and recognize, using context, where substitutions have occurred   **

## 2021-03-03 NOTE — UTILIZATION REVIEW
Continued Stay Review    Date: 3/3/2021                        Current Patient Class:  Inpatient   Current Level of Care:  Med Surg     HPI:70 y o  male initially admitted on 2/16/2021 - Sepsis Due to infected hardware/hematoma of R foot, now s/p removal and washout,  Atrial Fib  2/17 S/p INCISION AND DRAINAGE (I&D) EXTREMITY (Right)  REMOVAL EXTERNAL FIXATOR (Right)  2/22 S/p TMA of left foot     Assessment/Plan: 3/3 -  Pt has TMA with dehiscence and significant areas of bone exposure and lack of  Soft tissue coverage  Plan to perform free tissue transfer with fasciocutaneous flap for coverage,  And to stabilize the affected bone segments exposed with percutaneous fixation  Plan for washout and percutaneous fixation of the medial column, and a  tenative plan for limb salvage surgery on 4/7/2021  Currently Dressing  CDI , wound vac running with good output and without leaks or blockage         Pertinent Labs/Diagnostic Results:     Results from last 7 days   Lab Units 03/03/21  0700 03/02/21  1721 03/02/21  1116 03/02/21  0703 03/01/21  2104 03/01/21  1719 03/01/21  1127 03/01/21  0815 02/28/21  2104 02/28/21  1717 02/28/21  1211 02/28/21  0729   POC GLUCOSE mg/dl 134 322* 164* 126 249* 330* 170* 149* 224* 236* 240* 146*         Vital Signs:   Date/Time  Temp  Pulse  Resp  BP  MAP (mmHg)  SpO2  O2 Device  Cardiac (WDL)  Patient Position - Orthostatic VS   03/03/21 06:12:45  98 °F (36 7 °C)  94  18  134/82  99  97 %  --  --  --   03/03/21 0015  --  --  --  --  --  --  None (Room air)  --  --   03/02/21 23:34:50  98 1 °F (36 7 °C)  84  17  134/76  95  100 %  --  --  --   03/02/21 2130  --  98  --  102/60  --  --  --  --  Lying   03/02/21 1630  --  --  --  --  --  97 %  None (Room air)  --  --   03/02/21 15:51:23  98 3 °F (36 8 °C)  77  19  136/89  105  97 %  --  --  --   03/02/21 1050  98 °F (36 7 °C)  89  17  141/80  --  99 %  --  --  --   03/02/21 0737  --  --  --  --  --  --  None (Room air)  --  --   03/02/21 07:03:03  97 9 °F (36 6 °C)  81  17  139/84  102  99 %  --  --  --   03/02/21 03:02:11  98 3 °F (36 8 °C)  74  17  138/84  102  100 %  --  --  --   03/01/21 23:49:27  99 1 °F (37 3 °C)  78  17  130/75  93  98 %  --  --  --   03/01/21 2157  --  78  --  128/78  --  --  --  --  --   03/01/21 2000  --  --  --  --  --  --  None (Room air)  --  --   03/01/21 19:12:49  98 1 °F (36 7 °C)  87  17  138/85  103  98 %  None (Room air)  --  --   03/01/21 17:20:29  97 8 °F (36 6 °C)  102  --  --  --  97 %  --  --  --   03/01/21 1538  97 8 °F (36 6 °C)  98  18  126/73  --  97 %  None (Room air)  --  Lying   03/01/21 1438  98 6 °F (37 °C)  85  18  128/76  --  98 %  None (Room air)  --  Sitting   03/01/21 1338  --  84  18  119/67  --  97 %  None (Room air)  --  Sitting   03/01/21 1238  98 6 °F (37 °C)  80  --  143/95  111  96 %  --  --  --   03/01/21 12:37:26  98 6 °F (37 °C)  76  18  --  --  96 %  --  --  --   03/01/21 1200  97 5 °F (36 4 °C)  92  12  149/92  --  99 %  None (Room air)  X  --         Medications:   Scheduled Medications:  amoxicillin, 500 mg, Oral, Q8H SUNG  apixaban, 5 mg, Oral, BID  atorvastatin, 40 mg, Oral, Daily  bisacodyl, 10 mg, Rectal, Once  doxycycline hyclate, 100 mg, Oral, Q12H SUNG  insulin glargine, 15 Units, Subcutaneous, HS  insulin lispro, 1-6 Units, Subcutaneous, 4x Daily (AC & HS)  lisinopril, 40 mg, Oral, Daily  metoprolol tartrate, 100 mg, Oral, Q12H SUNG  senna-docusate sodium, 1 tablet, Oral, HS      Continuous IV Infusions:     PRN Meds:  acetaminophen, 650 mg, Oral, Q6H PRN  HYDROmorphone, 0 5 mg, Intravenous, Q4H PRN  LORazepam, 0 5 mg, Oral, Q8H PRN  metoprolol, 5 mg, Intravenous, Q6H PRN  oxyCODONE, 10 mg, Oral, Q4H PRN  oxyCODONE, 5 mg, Oral, Q4H PRN  polyethylene glycol, 17 g, Oral, Daily PRN    Nursing Orders -  SCD's to le's- vac dressing - Aqua K  Pad-  Diet cons carb     Discharge Plan: TBD     Network Utilization Review Department  ATTENTION: Please call with any questions or concerns to [phone] and carefully listen to the prompts so that you are directed to the right person  All voicemails are confidential   Mara Bereket all requests for admission clinical reviews, approved or denied determinations and any other requests to dedicated fax number below belonging to the campus where the patient is receiving treatment   List of dedicated fax numbers for the Facilities:  [address] DENIALS (Administrative/Medical Necessity) [phone]   [address] (Maternity/NICU/Pediatrics) [phone]   [address] [place] [address] ([place]) [address] [address] [place] [address] [address] [place] [address] [phone]

## 2021-03-03 NOTE — CONSULTS
Impression/Plan:     1- Postoperative bleeding  complicated by hematoma formation,  compartment syndrome,  with superimposed infection:  Patient was taken to operation room 2/17/2021 where he underwent external fixator removal, hematoma evacuation, fasciotomy   Small amount of purulence noted   Wound cultures collected intraoperatively from lateral and medial aspect of the foot both growing Enterobacter cloaca   E faecalis  also growing in 1 operative culture specimen  Due to progressive ischemia, patient was taken again to OR 02/22/2021 where he underwent TMA   Operative report reviewed and case discussed with podiatrist Dr Summer De Oliveira was removed   All infected tissue removed   Patient had been on IV Zosyn previously  With hardware removal, he was transition to amoxicillin/doxycycline, which he is still on  Now, with plan for repeat insertion of hardware/screw, we need to confirm that there is no underlying osteomyelitis  Ideally, as surgery, bone scraping should be done for culture  If bone appears hard and there is no growth from bone script, patient probably does not need additional antibiotic   - can continue amoxicillin/doxycycline p o   - transition to IV Zosyn michael op  - recommend sending bone scraping for C&S at surgery           2- diabetes mellitus type 2, A1c 7 8%   Risk factor for infectious complications as mentioned above  -  management as per primary service team        3- atrial fibrillation:  -rate control and anticoagulation as per primary service team     Hospitalization records reviewed in detail  Discussed with the patient and his wife in detail  I will text recommendation above to Dr Kendall Armendariz from Podiatry  Antibiotics:  Amoxicillin/doxycycline     Subjective: We are asked to re-evaluate the patient due to need to insert screw in bone in right foot  Patient had evidence of infection foot previously    However, with prior removal of hardware, there was no need for long-term IV antibiotic  He was treated with a short course of p o  Antibiotic, which should have completed on , although he is still on them (amoxicillin/doxycycline)  At present, patient is comfortable  Pain in foot is minimal   No fever/chills  Objective:  Vitals:  Temp:  [98 °F (36 7 °C)-98 7 °F (37 1 °C)] 98 7 °F (37 1 °C)  HR:  [76-98] 76  Resp:  [17-18] 18  BP: (102-139)/(60-82) 139/76  SpO2:  [96 %-100 %] 96 %  Temp (24hrs), Av 3 °F (36 8 °C), Min:98 °F (36 7 °C), Max:98 7 °F (37 1 °C)  Current: Temperature: 98 7 °F (37 1 °C)    Physical Exam:     General: Awake, alert, cooperative, no distress  Neck:  Supple  No mass  No lymphadenopathy  Lungs: Expansion symmetric, no rales, no wheezing, respirations unlabored  Heart:  Regular rate and rhythm, S1 and S2 normal, no murmur  Abdomen: Soft, nondistended, non-tender, bowel sounds active all four quadrants, no masses, no organomegaly  Extremities: Trace leg edema  Foot with dressing in place  Dressing is dry  No erythema/warmth beyond dressing  Minimal tenderness  Skin:  No rash  Neuro: Moves all extremities  Invasive Devices     Peripheral Intravenous Line            Peripheral IV 21 Dorsal (posterior); Right;Proximal;Medial Forearm 3 days          Drain            Negative Pressure Wound Therapy (V A C ) Foot 8 days                Labs studies:   I have personally reviewed pertinent labs  Imaging Studies:   I have personally reviewed pertinent imaging study reports and images in PACS  EKG, Pathology, and Other Studies:   I have personally reviewed pertinent reports

## 2021-03-03 NOTE — CASE MANAGEMENT
TC from wife [facility] Go [phone] asking for updates on dc & medical plan  Communicated with podiatry & pt might go for surgery tomorrow & could be ready for dc Friday or this weekend  They will call wife later today with updates  TC back to [facility] Go & updated her  Wife preference is [facility]  Ecin message sent to update them as well  Additional referrals out for back up  Pt's wife hoping for [facility]

## 2021-03-04 ENCOUNTER — APPOINTMENT (INPATIENT)
Dept: RADIOLOGY | Facility: HOSPITAL | Age: 71
DRG: 856 | End: 2021-03-04
Payer: COMMERCIAL

## 2021-03-04 ENCOUNTER — ANESTHESIA EVENT (INPATIENT)
Dept: PERIOP | Facility: HOSPITAL | Age: 71
DRG: 856 | End: 2021-03-04
Payer: COMMERCIAL

## 2021-03-04 ENCOUNTER — ANESTHESIA (INPATIENT)
Dept: PERIOP | Facility: HOSPITAL | Age: 71
DRG: 856 | End: 2021-03-04
Payer: COMMERCIAL

## 2021-03-04 LAB
GLUCOSE SERPL-MCNC: 128 MG/DL (ref 65–140)
GLUCOSE SERPL-MCNC: 128 MG/DL (ref 65–140)

## 2021-03-04 PROCEDURE — 99024 POSTOP FOLLOW-UP VISIT: CPT | Performed by: PODIATRIST

## 2021-03-04 PROCEDURE — 99232 SBSQ HOSP IP/OBS MODERATE 35: CPT | Performed by: INTERNAL MEDICINE

## 2021-03-04 PROCEDURE — 87176 TISSUE HOMOGENIZATION CULTR: CPT | Performed by: PODIATRIST

## 2021-03-04 PROCEDURE — 0QHN34Z INSERTION OF INTERNAL FIXATION DEVICE INTO RIGHT METATARSAL, PERCUTANEOUS APPROACH: ICD-10-PCS | Performed by: PODIATRIST

## 2021-03-04 PROCEDURE — 11044 DBRDMT BONE 1ST 20 SQ CM/<: CPT | Performed by: PODIATRIST

## 2021-03-04 PROCEDURE — 82948 REAGENT STRIP/BLOOD GLUCOSE: CPT

## 2021-03-04 PROCEDURE — 87070 CULTURE OTHR SPECIMN AEROBIC: CPT | Performed by: PODIATRIST

## 2021-03-04 PROCEDURE — 97530 THERAPEUTIC ACTIVITIES: CPT

## 2021-03-04 PROCEDURE — 97535 SELF CARE MNGMENT TRAINING: CPT

## 2021-03-04 PROCEDURE — 73630 X-RAY EXAM OF FOOT: CPT

## 2021-03-04 PROCEDURE — C1713 ANCHOR/SCREW BN/BN,TIS/BN: HCPCS | Performed by: PODIATRIST

## 2021-03-04 PROCEDURE — 28740 FUSION OF FOOT BONES: CPT | Performed by: PODIATRIST

## 2021-03-04 PROCEDURE — 87077 CULTURE AEROBIC IDENTIFY: CPT | Performed by: PODIATRIST

## 2021-03-04 PROCEDURE — 76000 FLUOROSCOPY <1 HR PHYS/QHP: CPT | Performed by: PODIATRIST

## 2021-03-04 PROCEDURE — 73620 X-RAY EXAM OF FOOT: CPT

## 2021-03-04 PROCEDURE — 97606 NEG PRS WND THER DME>50 SQCM: CPT | Performed by: PODIATRIST

## 2021-03-04 PROCEDURE — 87075 CULTR BACTERIA EXCEPT BLOOD: CPT | Performed by: PODIATRIST

## 2021-03-04 PROCEDURE — 0QBL3ZZ EXCISION OF RIGHT TARSAL, PERCUTANEOUS APPROACH: ICD-10-PCS | Performed by: PODIATRIST

## 2021-03-04 PROCEDURE — 87205 SMEAR GRAM STAIN: CPT | Performed by: PODIATRIST

## 2021-03-04 PROCEDURE — 87186 SC STD MICRODIL/AGAR DIL: CPT | Performed by: PODIATRIST

## 2021-03-04 DEVICE — IMPLANTABLE DEVICE
Type: IMPLANTABLE DEVICE | Status: FUNCTIONAL
Brand: PLATE, SPIDER, SMALL FRAGMENT

## 2021-03-04 DEVICE — IMPLANTABLE DEVICE: Type: IMPLANTABLE DEVICE | Site: FOOT | Status: FUNCTIONAL

## 2021-03-04 RX ORDER — LIDOCAINE HYDROCHLORIDE 10 MG/ML
INJECTION, SOLUTION EPIDURAL; INFILTRATION; INTRACAUDAL; PERINEURAL AS NEEDED
Status: DISCONTINUED | OUTPATIENT
Start: 2021-03-04 | End: 2021-03-04

## 2021-03-04 RX ORDER — BUPIVACAINE HYDROCHLORIDE 2.5 MG/ML
INJECTION, SOLUTION EPIDURAL; INFILTRATION; INTRACAUDAL AS NEEDED
Status: DISCONTINUED | OUTPATIENT
Start: 2021-03-04 | End: 2021-03-04 | Stop reason: HOSPADM

## 2021-03-04 RX ORDER — PROPOFOL 10 MG/ML
INJECTION, EMULSION INTRAVENOUS CONTINUOUS PRN
Status: DISCONTINUED | OUTPATIENT
Start: 2021-03-04 | End: 2021-03-04

## 2021-03-04 RX ORDER — PROPOFOL 10 MG/ML
INJECTION, EMULSION INTRAVENOUS AS NEEDED
Status: DISCONTINUED | OUTPATIENT
Start: 2021-03-04 | End: 2021-03-04

## 2021-03-04 RX ORDER — SODIUM CHLORIDE, SODIUM LACTATE, POTASSIUM CHLORIDE, CALCIUM CHLORIDE 600; 310; 30; 20 MG/100ML; MG/100ML; MG/100ML; MG/100ML
INJECTION, SOLUTION INTRAVENOUS CONTINUOUS PRN
Status: DISCONTINUED | OUTPATIENT
Start: 2021-03-04 | End: 2021-03-04

## 2021-03-04 RX ORDER — LIDOCAINE HYDROCHLORIDE 10 MG/ML
INJECTION, SOLUTION EPIDURAL; INFILTRATION; INTRACAUDAL; PERINEURAL AS NEEDED
Status: DISCONTINUED | OUTPATIENT
Start: 2021-03-04 | End: 2021-03-04 | Stop reason: HOSPADM

## 2021-03-04 RX ORDER — FENTANYL CITRATE/PF 50 MCG/ML
25 SYRINGE (ML) INJECTION
Status: DISCONTINUED | OUTPATIENT
Start: 2021-03-04 | End: 2021-03-04 | Stop reason: HOSPADM

## 2021-03-04 RX ADMIN — SODIUM CHLORIDE, SODIUM LACTATE, POTASSIUM CHLORIDE, AND CALCIUM CHLORIDE: .6; .31; .03; .02 INJECTION, SOLUTION INTRAVENOUS at 18:52

## 2021-03-04 RX ADMIN — DOXYCYCLINE 100 MG: 100 CAPSULE ORAL at 09:17

## 2021-03-04 RX ADMIN — SENNOSIDES, DOCUSATE SODIUM 1 TABLET: 8.6; 5 TABLET ORAL at 22:06

## 2021-03-04 RX ADMIN — ATORVASTATIN CALCIUM 40 MG: 40 TABLET, FILM COATED ORAL at 17:39

## 2021-03-04 RX ADMIN — AMOXICILLIN 500 MG: 500 CAPSULE ORAL at 05:36

## 2021-03-04 RX ADMIN — AMOXICILLIN 500 MG: 500 CAPSULE ORAL at 22:07

## 2021-03-04 RX ADMIN — Medication 25 MCG: at 20:53

## 2021-03-04 RX ADMIN — LIDOCAINE HYDROCHLORIDE 50 MG: 10 INJECTION, SOLUTION EPIDURAL; INFILTRATION; INTRACAUDAL; PERINEURAL at 19:01

## 2021-03-04 RX ADMIN — METOPROLOL TARTRATE 100 MG: 50 TABLET, FILM COATED ORAL at 22:09

## 2021-03-04 RX ADMIN — ACETAMINOPHEN 650 MG: 325 TABLET ORAL at 00:12

## 2021-03-04 RX ADMIN — METOPROLOL TARTRATE 100 MG: 50 TABLET, FILM COATED ORAL at 09:16

## 2021-03-04 RX ADMIN — DOXYCYCLINE 100 MG: 100 CAPSULE ORAL at 22:09

## 2021-03-04 RX ADMIN — PROPOFOL 100 MG: 10 INJECTION, EMULSION INTRAVENOUS at 19:01

## 2021-03-04 RX ADMIN — LISINOPRIL 40 MG: 20 TABLET ORAL at 09:16

## 2021-03-04 RX ADMIN — PROPOFOL 50 MCG/KG/MIN: 10 INJECTION, EMULSION INTRAVENOUS at 19:01

## 2021-03-04 RX ADMIN — AMOXICILLIN 500 MG: 500 CAPSULE ORAL at 17:39

## 2021-03-04 RX ADMIN — APIXABAN 5 MG: 5 TABLET, FILM COATED ORAL at 17:39

## 2021-03-04 RX ADMIN — INSULIN GLARGINE 15 UNITS: 100 INJECTION, SOLUTION SUBCUTANEOUS at 22:06

## 2021-03-04 NOTE — PLAN OF CARE
Problem: Potential for Falls  Goal: Patient will remain free of falls  Description: INTERVENTIONS:  - Assess patient frequently for physical needs  -  Identify cognitive and physical deficits and behaviors that affect risk of falls    -  [place] fall precautions as indicated by assessment   - Educate patient/family on patient safety including physical limitations  - Instruct patient to call for assistance with activity based on assessment  - Modify environment to reduce risk of injury  - Consider OT/PT consult to assist with strengthening/mobility  3/4/2021 0927 by Deirdre Quiñones RN  Outcome: Progressing  3/4/2021 0926 by Deirdre Quiñones RN  Outcome: Progressing     Problem: Prexisting or High Potential for Compromised Skin Integrity  Goal: Skin integrity is maintained or improved  Description: INTERVENTIONS:  - Identify patients at risk for skin breakdown  - Assess and monitor skin integrity  - Assess and monitor nutrition and hydration status  - Monitor labs   - Assess for incontinence   - Turn and reposition patient  - Assist with mobility/ambulation  - Relieve pressure over bony prominences  - Avoid friction and shearing  - Provide appropriate hygiene as needed including keeping skin clean and dry  - Evaluate need for skin moisturizer/barrier cream  - Collaborate with interdisciplinary team   - Patient/family teaching  - Consider wound care consult   3/4/2021 0927 by Deirdre Quiñones RN  Outcome: Progressing  3/4/2021 0926 by Deirdre Quiñones RN  Outcome: Progressing     Problem: PAIN - ADULT  Goal: Verbalizes/displays adequate comfort level or baseline comfort level  Description: Interventions:  - Encourage patient to monitor pain and request assistance  - Assess pain using appropriate pain scale  - Administer analgesics based on type and severity of pain and evaluate response  - Implement non-pharmacological measures as appropriate and evaluate response  - Consider cultural and social influences on pain and pain management  - Notify physician/advanced practitioner if interventions unsuccessful or patient reports new pain  3/4/2021 0927 by Danitza Arroyo RN  Outcome: Progressing  3/4/2021 0926 by Danitza Arroyo RN  Outcome: Progressing     Problem: INFECTION - ADULT  Goal: Absence or prevention of progression during hospitalization  Description: INTERVENTIONS:  - Assess and monitor for signs and symptoms of infection  - Monitor lab/diagnostic results  - Monitor all insertion sites, i e  indwelling lines, tubes, and drains  - Monitor endotracheal if appropriate and nasal secretions for changes in amount and color  - [place] appropriate cooling/warming therapies per order  - Administer medications as ordered  - Instruct and encourage patient and family to use good hand hygiene technique  - Identify and instruct in appropriate isolation precautions for identified infection/condition  3/4/2021 0927 by Danitza Arroyo RN  Outcome: Progressing  3/4/2021 0926 by Danitza Arroyo RN  Outcome: Progressing     Problem: SAFETY ADULT  Goal: Patient will remain free of falls  Description: INTERVENTIONS:  - Assess patient frequently for physical needs  -  Identify cognitive and physical deficits and behaviors that affect risk of falls    -  [place] fall precautions as indicated by assessment   - Educate patient/family on patient safety including physical limitations  - Instruct patient to call for assistance with activity based on assessment  - Modify environment to reduce risk of injury  - Consider OT/PT consult to assist with strengthening/mobility  3/4/2021 0927 by Danitza Arroyo RN  Outcome: Progressing  3/4/2021 0926 by Danitza Arroyo RN  Outcome: Progressing  Goal: Maintain or return to baseline ADL function  Description: INTERVENTIONS:  -  Assess patient's ability to carry out ADLs; assess patient's baseline for ADL function and identify physical deficits which impact ability to perform ADLs (bathing, care of mouth/teeth, toileting, grooming, dressing, etc )  - Assess/evaluate cause of self-care deficits   - Assess range of motion  - Assess patient's mobility; develop plan if impaired  - Assess patient's need for assistive devices and provide as appropriate  - Encourage maximum independence but intervene and supervise when necessary  - Involve family in performance of ADLs  - Assess for home care needs following discharge   - Consider OT consult to assist with ADL evaluation and planning for discharge  - Provide patient education as appropriate  3/4/2021 0927 by Julee Alvarado RN  Outcome: Progressing  3/4/2021 0926 by Julee Alvarado RN  Outcome: Progressing  Goal: Maintain or return mobility status to optimal level  Description: INTERVENTIONS:  - Assess patient's baseline mobility status (ambulation, transfers, stairs, etc )    - Identify cognitive and physical deficits and behaviors that affect mobility  - Identify mobility aids required to assist with transfers and/or ambulation (gait belt, sit-to-stand, lift, walker, cane, etc )  - [place] fall precautions as indicated by assessment  - Record patient progress and toleration of activity level on Mobility SBAR; progress patient to next Phase/Stage  - Instruct patient to call for assistance with activity based on assessment  - Consider rehabilitation consult to assist with strengthening/weightbearing, etc   3/4/2021 0927 by Julee Alvarado RN  Outcome: Progressing  3/4/2021 0926 by Julee Alvarado RN  Outcome: Progressing     Problem: DISCHARGE PLANNING  Goal: Discharge to home or other facility with appropriate resources  Description: INTERVENTIONS:  - Identify barriers to discharge w/patient and caregiver  - Arrange for needed discharge resources and transportation as appropriate  - Identify discharge learning needs (meds, wound care, etc )  - Arrange for interpretive services to assist at discharge as needed  - Refer to Case Management Department for coordinating discharge planning if the patient needs post-hospital services based on physician/advanced practitioner order or complex needs related to functional status, cognitive ability, or social support system  3/4/2021 0927 by Jessica Mao RN  Outcome: Progressing  3/4/2021 0926 by Jessica Mao RN  Outcome: Progressing     Problem: METABOLIC, FLUID AND ELECTROLYTES - ADULT  Goal: Glucose maintained within target range  Description: INTERVENTIONS:  - Monitor Blood Glucose as ordered  - Assess for signs and symptoms of hyperglycemia and hypoglycemia  - Administer ordered medications to maintain glucose within target range  - Assess nutritional intake and initiate nutrition service referral as needed  Outcome: Progressing     Problem: MUSCULOSKELETAL - ADULT  Goal: Maintain or return mobility to safest level of function  Description: INTERVENTIONS:  - Assess patient's ability to carry out ADLs; assess patient's baseline for ADL function and identify physical deficits which impact ability to perform ADLs (bathing, care of mouth/teeth, toileting, grooming, dressing, etc )  - Assess/evaluate cause of self-care deficits   - Assess range of motion  - Assess patient's mobility  - Assess patient's need for assistive devices and provide as appropriate  - Encourage maximum independence but intervene and supervise when necessary  - Involve family in performance of ADLs  - Assess for home care needs following discharge   - Consider OT consult to assist with ADL evaluation and planning for discharge  - Provide patient education as appropriate  Outcome: Progressing  Goal: Maintain proper alignment of affected body part  Description: INTERVENTIONS:  - Support, maintain and protect limb and body alignment  - Provide patient/ family with appropriate education  Outcome: Progressing

## 2021-03-04 NOTE — OCCUPATIONAL THERAPY NOTE
Occupational Therapy Treatment Note:       03/04/21 1215   OT Last Visit   OT Visit Date 03/04/21   Note Type   Note Type Treatment   Restrictions/Precautions   Weight Bearing Precautions Per Order Yes   RLE Weight Bearing Per Order NWB   Other Precautions WBS; Fall Risk   Pain Assessment   Pain Score No Pain   ADL   Where Assessed Chair   LB Dressing Assistance 4  Minimal Assistance   LB Dressing Deficit Don/doff R sock; Don/doff L sock; Thread RLE into pants; Thread LLE into pants;Pull up over hips   LB Dressing Comments Pt demosntrates ability to doff don L sock, first simulated pulling up pants with one minor LOB able to self correct in stance  Pt then compeltes donning shorts with assist to thread wound vac only  Pt is then able to compeltes pulling up over hips in stance with unilateral support on RW   Functional Standing Tolerance   Time ~5 mins   Activity static standing   Comments RW level   Bed Mobility   Supine to Sit 5  Supervision   Additional items Assist x 1   Sit to Supine 5  Supervision   Additional items Assist x 1   Transfers   Sit to Stand 5  Supervision   Additional items Assist x 1   Stand to Sit 5  Supervision   Additional items Assist x 1   Stand pivot 5  Supervision   Additional items Assist x 1   Additional Comments RW level   Cognition   Overall Cognitive Status [facility]   Arousal/Participation Alert; Cooperative   Attention Within functional limits   Orientation Level Oriented X4   Memory Within functional limits   Following Commands Follows all commands and directions without difficulty   Comments Plesant and cooperative   Reports and demosntrates understanding of all education this date   Activity Tolerance   Activity Tolerance Patient limited by fatigue   Medical Staff Made Aware NSG aware   Assessment   Assessment Pt was seen this date for OT tx session focusing on self care tasks, bed mobility, sit to stand progressions, standing tolerance, tranfers, compensatory techniques, energy conservation, home d/c discussion and planning and overall activity tolerance  Pt presents  supine, completes previously mentioned tasks at documented assist levels please see above in flow sheet  Pt has made notable progress since last session  Maintains WBS throughout session, understanding of compensatory techniques  Pt fatigued easily this date requiring increased rest break throughout pt reports he has been NPO since 15 oclock yesterday as he is pending an OR procedure and just feels tired and weak  Pt resting in supine at end of session with a ll needs in reach  Would benefit from conitnued OT tx to improve overall functional abilities  continue to follow with current POC  due to signifiant progress anticipate pt can d/c to home with home therapies and support  Continue to tfollow to asses same      Plan   Treatment Interventions ADL retraining   Goal Expiration Date 03/06/21   OT Treatment Day 4   OT Frequency 3-5x/wk   Recommendation   OT Discharge Recommendation Other (Comment)  (Anticipate d/c to home with support and home OT pend progres)   Equipment Recommended Bedside commode     [employer], [employer]

## 2021-03-04 NOTE — CASE MANAGEMENT
CM received call from pt's wife  She is requesting a referral to [facility] placed referral  This is first choice

## 2021-03-04 NOTE — ANESTHESIA PREPROCEDURE EVALUATION
Procedure:  Percutaneous screw placement right foot  (Right Foot)    Relevant Problems   CARDIO   (+) Atrial fibrillation with RVR (HCC)   (+) Essential hypertension   (+) Hyperlipidemia      ENDO   (+) Type 2 diabetes mellitus with diabetic neuropathy (HCC)      /RENAL   (+) Benign prostatic hyperplasia with lower urinary tract symptoms      NEURO/PSYCH   (+) Type 2 diabetes mellitus with diabetic neuropathy (HCC)   HR currently 70's   HTN on ACEi    Physical Exam    Airway    Mallampati score: II  TM Distance: >3 FB  Neck ROM: full     Dental   No notable dental hx     Cardiovascular  Cardiovascular exam normal    Pulmonary  Pulmonary exam normal     Other Findings        Anesthesia Plan  ASA Score- 3     Anesthesia Type- IV sedation with anesthesia with ASA Monitors  Additional Monitors:   Airway Plan:           Plan Factors-    Chart reviewed  Patient summary reviewed  Induction- intravenous  Postoperative Plan-     Informed Consent- Anesthetic plan and risks discussed with patient  I personally reviewed this patient with the CRNA  Discussed and agreed on the Anesthesia Plan with the CRNA  Elvia Hoffmann

## 2021-03-04 NOTE — PHYSICAL THERAPY NOTE
Physical Therapy Cancellation Note    Attempted to see pt this PM   Pt reports he is scheduled for surgery soon & is tired from being NPO today  Family present & discussed pt progress & discharge plan  Will continue to follow and treat to progress overall mobility, ensuring safe return to OF      Nkechi Tom, PTA

## 2021-03-04 NOTE — PROGRESS NOTES
Podiatry - Progress Note  Patient: Mali Benito  78 y o  male   MRN: [id number]  PCP: Aline Malin MD  Unit/Bed#: [facility] 102-84 Encounter: [id number]  Date Of Visit: 03/04/21    ASSESSMENT:    Mali Benito  is a 78 y o  male with:    1  Right foot hematoma, surgical site infection, ischemia  - S/p exfix removal, I&D, fasciotomy (DOS 2/17/21)  - s/p TMA of left foot 2/22  - s/p debridement and vac application per Plastics 3/1/21  2  Congenital talipes equinovarus right foot             - s/p midfoot arthrodesis, exfix application (DOS 2/22/94)  3  T2Dm, last A1C 7 8 %  4  HTN  5  Altered mental status  6  Constipation  7  atrial fibrillation  8  Sepsis on admission - resolved      PLAN:    · Patient to go to OR today,03/04/21, for  Right foot washout with percutaneous stabilization of the medial column and VAC change with Dr Rei Smith  Appreciate Infectious Disease recommendations in regards to intraoperative bone cultures to determine postoperative antibiotic plan  · Consent placed in chart  To be signed with surgery prior to procedure  · Confirmed NPO status  · H&P, vitals, and current labs reviewed  No acute changes noted  · Alternatives, risks, and complications discussed with patient  · All questions answered  No guarantees of outcome  · Dressing c/d/I, wound vac running at 125 low continuous with good output and without leaks or blockage  · Rest of medical care per primary team        SUBJECTIVE:     The patient was seen, evaluated, and assessed at bedside today  The patient was awake, alert, and in no acute distress  Patient confirmed NPO status  All questions and concerns regarding the surgical procedure addressed  Patient understands risks vs benefits of procedure and remains amenable with plan for surgery today  Patient denies N/V/F/chills/SOB/CP        OBJECTIVE:     Vitals:   /75   Pulse 84   Temp (!) 97 4 °F (36 3 °C)   Resp 18   Ht 6' 4" (1 93 m)   Wt 109 kg (240 lb 15 4 oz) SpO2 98%   BMI 29 33 kg/m²     Temp (24hrs), Av °F (36 7 °C), Min:97 4 °F (36 3 °C), Max:98 7 °F (37 1 °C)      Physical Exam:     General:  Alert, cooperative, and in no distress  Lower extremity exam:  Cardiovascular status at baseline  Neurological status at baseline  Musculoskeletal status at baseline  No calf tenderness noted bilaterally  Dressing left intact to the Operating Room  Dressing c/d/I, wound vac running at 125 low continuous with good output and without leaks or blockage  Additional Data:     Labs: Invalid input(s): LABALBU        * I Have Reviewed All Lab Data Listed Above  Recent Cultures (last 7 days):               Imaging: I have personally reviewed pertinent films in PACS  EKG, Pathology, and Other Studies: I have personally reviewed pertinent reports  ** Please Note: Portions of the record may have been created with voice recognition software  Occasional wrong word or "sound a like" substitutions may have occurred due to the inherent limitations of voice recognition software  Read the chart carefully and recognize, using context, where substitutions have occurred   **

## 2021-03-04 NOTE — PROGRESS NOTES
Progress Note - Infectious Disease   Shelia Barnard  78 y o  male MRN: [id number]  Unit/Bed#: [facility] 624-01 Encounter: [id number]      Impression/Plan:     1- Postoperative bleeding  complicated by hematoma formation,  compartment syndrome,  with superimposed infection:  Patient was taken to operation room 2/17/2021 where he underwent external fixator removal, hematoma evacuation, fasciotomy   Small amount of purulence noted   Wound cultures collected intraoperatively from lateral and medial aspect of the foot both growing Enterobacter cloaca   E faecalis  also growing in 1 operative culture specimen  Due to progressive ischemia, patient was taken again to OR 02/22/2021 where he underwent TMA   Operative report reviewed and case discussed with podiatrist Dr Calvin Martínez was removed   All infected tissue removed   Patient had been on IV Zosyn previously  With hardware removal, he was transition to amoxicillin/doxycycline, which he is still on  Now, with plan for repeat insertion of hardware/screw, we need to confirm that there is no underlying osteomyelitis  Ideally, as surgery, bone scraping should be done for culture  If bone appears hard and there is no growth from bone script, patient probably does not need additional antibiotic   - can continue amoxicillin/doxycycline p o   - transition to IV Zosyn michael op  - follow-up on operative findings  - recommend sending bone scraping for C&S at surgery           2- diabetes mellitus type 2, A1c 7 8%   Risk factor for infectious complications as mentioned above  -  management as per primary service team        3- atrial fibrillation:  -rate control and anticoagulation as per primary service team     Discussed with the patient in detail regarding the above plan  Discussed with Podiatry      Antibiotics:  Amoxicillin/doxycycline     Subjective:  Patient is comfortable  Leg pain mild and well controlled  Temperature stays down  No chills    He is tolerating antibiotics well  No nausea, vomiting or diarrhea       Objective:  Vitals:  Temp:  [97 4 °F (36 3 °C)-98 7 °F (37 1 °C)] 97 4 °F (36 3 °C)  HR:  [76-84] 84  Resp:  [18] 18  BP: (124-139)/(75-78) 124/75  SpO2:  [96 %-98 %] 98 %  Temp (24hrs), Av °F (36 7 °C), Min:97 4 °F (36 3 °C), Max:98 7 °F (37 1 °C)  Current: Temperature: (!) 97 4 °F (36 3 °C)    Physical Exam:     General: Awake, alert, cooperative, no distress  Neck:  Supple  No mass  No lymphadenopathy  Lungs: Expansion symmetric, no rales, no wheezing, respirations unlabored  Heart:  Regular rate and rhythm, S1 and S2 normal, no murmur  Abdomen: Soft, nondistended, non-tender, bowel sounds active all four quadrants, no masses, no organomegaly  Extremities: Right foot wound with VAC  No purulence  No erythema/warmth  Mild tenderness  Skin:  No rash  Neuro: Moves all extremities  Invasive Devices     Peripheral Intravenous Line            Peripheral IV 21 Dorsal (posterior); Right;Proximal;Medial Forearm 4 days          Drain            Negative Pressure Wound Therapy (V A C ) Foot 8 days                Labs studies:   I have personally reviewed pertinent labs  Invalid input(s): ALBUMIN            Imaging Studies:   I have personally reviewed pertinent imaging study reports and images in PACS  EKG, Pathology, and Other Studies:   I have personally reviewed pertinent reports

## 2021-03-04 NOTE — PLAN OF CARE
Problem: OCCUPATIONAL THERAPY ADULT  Goal: Performs self-care activities at highest level of function for planned discharge setting  See evaluation for individualized goals  Description: Treatment Interventions: ADL retraining, Functional transfer training, UE strengthening/ROM, Patient/family training, Endurance training, Equipment evaluation/education, Compensatory technique education, Continued evaluation, Energy conservation, Activityengagement          See flowsheet documentation for full assessment, interventions and recommendations  Outcome: Progressing  Note: Limitation: Decreased ADL status, Decreased Safe judgement during ADL, Decreased cognition, Decreased endurance, Decreased self-care trans, Decreased high-level ADLs  Prognosis: Good  Assessment: Pt was seen this date for OT tx session focusing on self care tasks, bed mobility, sit to stand progressions, standing tolerance, tranfers, compensatory techniques, energy conservation, home d/c discussion and planning and overall activity tolerance  Pt presents  supine, completes previously mentioned tasks at documented assist levels please see above in flow sheet  Pt has made notable progress since last session  Maintains WBS throughout session, understanding of compensatory techniques  Pt fatigued easily this date requiring increased rest break throughout pt reports he has been NPO since 15 oclock yesterday as he is pending an OR procedure and just feels tired and weak  Pt resting in supine at end of session with a ll needs in reach  Would benefit from conitnued OT tx to improve overall functional abilities  continue to follow with current POC  due to signifiant progress anticipate pt can d/c to home with home therapies and support  Continue to tfollow to asses same  OT Discharge Recommendation: Other (Comment)(Anticipate d/c to home with support and home OT pend progres)  OT - OK to Discharge:  Yes

## 2021-03-05 LAB
GLUCOSE SERPL-MCNC: 140 MG/DL (ref 65–140)
GLUCOSE SERPL-MCNC: 143 MG/DL (ref 65–140)
GLUCOSE SERPL-MCNC: 145 MG/DL (ref 65–140)
GLUCOSE SERPL-MCNC: 189 MG/DL (ref 65–140)
GLUCOSE SERPL-MCNC: 193 MG/DL (ref 65–140)
GLUCOSE SERPL-MCNC: 257 MG/DL (ref 65–140)
GLUCOSE SERPL-MCNC: 291 MG/DL (ref 65–140)

## 2021-03-05 PROCEDURE — 99024 POSTOP FOLLOW-UP VISIT: CPT | Performed by: PODIATRIST

## 2021-03-05 PROCEDURE — 82948 REAGENT STRIP/BLOOD GLUCOSE: CPT

## 2021-03-05 PROCEDURE — 97116 GAIT TRAINING THERAPY: CPT

## 2021-03-05 PROCEDURE — 99232 SBSQ HOSP IP/OBS MODERATE 35: CPT | Performed by: INTERNAL MEDICINE

## 2021-03-05 RX ADMIN — PIPERACILLIN AND TAZOBACTAM 3.38 G: 36; 4.5 INJECTION, POWDER, FOR SOLUTION INTRAVENOUS at 10:15

## 2021-03-05 RX ADMIN — METOPROLOL TARTRATE 100 MG: 50 TABLET, FILM COATED ORAL at 08:57

## 2021-03-05 RX ADMIN — INSULIN GLARGINE 15 UNITS: 100 INJECTION, SOLUTION SUBCUTANEOUS at 21:02

## 2021-03-05 RX ADMIN — APIXABAN 5 MG: 5 TABLET, FILM COATED ORAL at 17:44

## 2021-03-05 RX ADMIN — APIXABAN 5 MG: 5 TABLET, FILM COATED ORAL at 08:57

## 2021-03-05 RX ADMIN — LISINOPRIL 40 MG: 20 TABLET ORAL at 08:57

## 2021-03-05 RX ADMIN — OXYCODONE HYDROCHLORIDE 5 MG: 5 TABLET ORAL at 00:06

## 2021-03-05 RX ADMIN — PIPERACILLIN AND TAZOBACTAM 3.38 G: 36; 4.5 INJECTION, POWDER, FOR SOLUTION INTRAVENOUS at 17:45

## 2021-03-05 RX ADMIN — METOPROLOL TARTRATE 100 MG: 50 TABLET, FILM COATED ORAL at 21:03

## 2021-03-05 RX ADMIN — OXYCODONE HYDROCHLORIDE 5 MG: 5 TABLET ORAL at 05:10

## 2021-03-05 RX ADMIN — SENNOSIDES, DOCUSATE SODIUM 1 TABLET: 8.6; 5 TABLET ORAL at 21:02

## 2021-03-05 RX ADMIN — PIPERACILLIN AND TAZOBACTAM 3.38 G: 36; 4.5 INJECTION, POWDER, FOR SOLUTION INTRAVENOUS at 21:05

## 2021-03-05 RX ADMIN — ATORVASTATIN CALCIUM 40 MG: 40 TABLET, FILM COATED ORAL at 08:57

## 2021-03-05 RX ADMIN — AMOXICILLIN 500 MG: 500 CAPSULE ORAL at 05:11

## 2021-03-05 NOTE — PROGRESS NOTES
Progress Note - Infectious Disease   Niraj Romero  78 y o  male MRN: [id number]  Unit/Bed#: [facility] 624-01 Encounter: [id number]      Impression/Plan:     1- Postoperative bleeding  complicated by hematoma formation,  compartment syndrome,  with superimposed infection:  Patient was taken to operation room 2/17/2021 where he underwent external fixator removal, hematoma evacuation, fasciotomy   Small amount of purulence noted   Wound cultures collected intraoperatively from lateral and medial aspect of the foot both growing Enterobacter cloaca   E faecalis  also growing in 1 operative culture specimen  Due to progressive ischemia, patient was taken again to OR 02/22/2021 where he underwent TMA   Operative report reviewed and case discussed with podiatrist Dr Mickie Lagunas was removed   All infected tissue removed   Patient had been on IV Zosyn previously   With hardware removal, he was transition to amoxicillin/doxycycline  Yesterday, patient underwent I&D with screw fixation of right foot  Bone was hard and normal at surgery  Bone scraping was sent for C&S  Patient was changed back to IV antibiotic   - continue IV Zosyn  - follow-up on operative cultures  - if bone culture has no growth, transition back to p o  Amoxicillin/doxycycline x7 day post screw insertion          2- diabetes mellitus type 2, A1c 7 8%   Risk factor for infectious complications as mentioned above  -  management as per primary service team        3- atrial fibrillation:  -rate control and anticoagulation as per primary service team     Discussed with the patient in detail regarding the above plan      Antibiotics:  Zosyn  POD # 1      Subjective:  Patient is status post right foot I&D and placement of screw  Leg pain mild and well controlled  Temperature stays down  No chills  He is tolerating antibiotics well  No nausea, vomiting or diarrhea        Objective:  Vitals:  Temp:  [97 6 °F (36 4 °C)-98 8 °F (37 1 °C)] 97 9 °F (36 6 °C)  HR:  [47-98] 91  Resp:  [14-18] 18  BP: (100-145)/(69-90) 130/78  SpO2:  [91 %-100 %] 98 %  Temp (24hrs), Av 1 °F (36 7 °C), Min:97 6 °F (36 4 °C), Max:98 8 °F (37 1 °C)  Current: Temperature: 97 9 °F (36 6 °C)    Physical Exam:     General: Awake, alert, cooperative, no distress  Neck:  Supple  No mass  No lymphadenopathy  Lungs: Expansion symmetric, no rales, no wheezing, respirations unlabored  Heart:  Regular rate and rhythm, S1 and S2 normal, no murmur  Abdomen: Soft, nondistended, non-tender, bowel sounds active all four quadrants, no masses, no organomegaly  Extremities: Mild leg edema  Wound with VAC  No purulence  No erythema/warmth beyond dressing  Mild tenderness  Skin:  No rash  Neuro: Moves all extremities  Invasive Devices     Peripheral Intravenous Line            Peripheral IV 21 Left Antecubital less than 1 day          Drain            Negative Pressure Wound Therapy (V A C ) Foot 9 days                Labs studies:   I have personally reviewed pertinent labs  Invalid input(s): ALBUMIN      Results from last 7 days   Lab Units 21  194   GRAM STAIN RESULT  No Polys or Bacteria seen       Imaging Studies:   I have personally reviewed pertinent imaging study reports and images in PACS  EKG, Pathology, and Other Studies:   I have personally reviewed pertinent reports

## 2021-03-05 NOTE — PROGRESS NOTES
Podiatry - Progress Note  Patient: Cassia Mckeon  78 y o  male   MRN: [id number]  PCP: Lidia Nieves MD  Unit/Bed#: [facility] 350-47 Encounter: [id number]  Date Of Visit: 21    ASSESSMENT:    Cassia Mckeon  is a 78 y o  male with:    1  Right foot hematoma, surgical site infection, ischemia  - S/p exfix removal, I&D, fasciotomy (DOS 21)  - s/p TMA of left foot   - s/p debridement and vac application per Plastics 3/1/21  - s/p Percutaneous screw fixation right medial column, wound debridement and wound vac change (3/4/21)  2  Congenital talipes equinovarus right foot             - s/p midfoot arthrodesis, exfix application (DOS 02)  3  T2Dm, last A1C 7 8 %  4  HTN  5  Altered mental status  6  Constipation  7  atrial fibrillation  8  Sepsis on admission - resolved      PLAN:    · Post-surgical dressing located on surgical site and affected extremity were left intact today  · Wound vac is working properly, 125 mmHg continuous  · Vitals signs stable  Patient afebrile with no leukocytosis  No erythema, edema, or lymphangitis noted either proximal or distal to the dressings  · Pain is well controlled  Continue current pain management regimen  · Elevation on green foam wedges or pillows when non-ambulatory      Weightbearing status: NWB RLE    Dispo: pending acute rehab placement case management on board       SUBJECTIVE:     The patient was seen, evaluated, and assessed at bedside today  The patient was awake, alert, and in no acute distress  The patient denies pain at this time  Pain is well controlled with current pain management regimen  Patient reports normal appetite and using the restroom postoperatively  Patient denies N/V/F/chills/SOB/CP        OBJECTIVE:     Vitals:   /78   Pulse 91   Temp 97 9 °F (36 6 °C)   Resp 18   Ht 6' 4" (1 93 m)   Wt 109 kg (240 lb 15 4 oz)   SpO2 98%   BMI 29 33 kg/m²     Temp (24hrs), Av 1 °F (36 7 °C), Min:97 6 °F (36 4 °C), Max:98 8 °F (37 1 °C)      Physical Exam:     General:  Alert, cooperative, and in no distress  Lower extremity exam:  Cardiovascular status at baseline  Neurological status at baseline  Musculoskeletal status at baseline  No erythema, edema, or lymphangitis noted from dressing  Lower extremity temperature WNL  Wound vac working properly 125 mmHg continuous     Additional Data:     Labs: Invalid input(s): LABALBU        * I Have Reviewed All Lab Data Listed Above  Recent Cultures (last 7 days):     Results from last 7 days   Lab Units 03/04/21 1944   GRAM STAIN RESULT  No Polys or Bacteria seen     Results from last 7 days   Lab Units 03/04/21 1944   ANAEROBIC CULTURE  Culture results to follow  Imaging: I have personally reviewed pertinent films in PACS  EKG, Pathology, and Other Studies: I have personally reviewed pertinent reports  ** Please Note: Portions of the record may have been created with voice recognition software  Occasional wrong word or "sound a like" substitutions may have occurred due to the inherent limitations of voice recognition software  Read the chart carefully and recognize, using context, where substitutions have occurred   **

## 2021-03-05 NOTE — PLAN OF CARE
Problem: PHYSICAL THERAPY ADULT  Goal: Performs mobility at highest level of function for planned discharge setting  See evaluation for individualized goals  Description: Treatment/Interventions: Functional transfer training, LE strengthening/ROM, Elevations, Therapeutic exercise, Endurance training, Bed mobility, Gait training, OT  Equipment Recommended: Walker(at this time)       See flowsheet documentation for full assessment, interventions and recommendations  Outcome: Progressing  Note: Prognosis: Good  Problem List: Decreased strength, Decreased endurance, Impaired balance, Decreased mobility, Decreased coordination, Impaired judgement, Decreased safety awareness, Orthopedic restrictions  Assessment: Pt continues to utilize RW & ambulate up to household distances prior to requiring seated rest to recover  performed all transfers while maintaining NWB status  Discussed reason for NWB & importance of maintaining when pt inquired during session  At this time, recommendation is for discharge home vs rehab pending requisite home support given pt cognitive status & requisite assist for ADL management  Defer to OT assessment & recommendations for level of assist   Pt would benefit from trialing wheelchair mobility & instructions on safety since this will likely be primary mode of distance mobility until cleared for increased weight bearing on affected LE  Barriers to Discharge: Decreased caregiver support  Barriers to Discharge Comments: At conclusion of PT session pt returned BTB with phone and call bell within reach  Pt denies any further questions at this time  The patient's AM-PAC Basic Mobility Inpatient Short Form Raw Score is 14, Standardized Score is 35 55  A standardized score less than 42 9 suggests the patient may benefit from discharge to post-acute rehabilitation services  However, anticipate pt will progress quickly to allow for d/c home as he has good social support and an accessible home  Please also refer to the recommendation of the Physical Therapist for safe discharge planning  Recommending home with social support and HHPT upon d/c    PT Discharge Recommendation: Other (Comment)(rehab vs home pending support)     PT - OK to Discharge: Yes    See flowsheet documentation for full assessment

## 2021-03-05 NOTE — PHYSICAL THERAPY NOTE
Physical Therapy Progress Note     03/05/21 1441   PT Last Visit   PT Visit Date 03/05/21   Note Type   Note Type Treatment   Pain Assessment   Pain Assessment Tool Pain Assessment not indicated - pt denies pain   Restrictions/Precautions   RLE Weight Bearing Per Order NWB   Other Precautions Cognitive;WBS;Pain; Fall Risk;Multiple lines  (wound vac)   Subjective   Subjective Pt encountered supine in bed, pleasant and agreeable to treatment   Reports feeling well after surgery yesterday  Pt appeared confused as to why he was hopping, citing that he was told it was OK to weight bear on R heel  Bed Mobility   Supine to Sit 5  Supervision   Transfers   Sit to Stand 5  Supervision   Additional items Assist x 1   Stand to Sit 5  Supervision   Additional items Assist x 1   Stand pivot 5  Supervision   Additional items Assist x 1   Sit pivot 5  Supervision   Additional items Assist x 1  (squat pivot)   Ambulation/Elevation   Gait pattern   (3 point gait pattern)   Gait Assistance 5  Supervision   Additional items Assist x 1   Assistive Device Rolling walker   Distance 45' x 2   Balance   Static Sitting Good   Static Standing Fair -   Ambulatory Poor +   Endurance Deficit   Endurance Deficit Yes   Endurance Deficit Description fatigue   Activity Tolerance   Activity Tolerance Patient tolerated treatment well;Patient limited by fatigue   Nurse Made Aware yes   Assessment   Prognosis Good   Problem List Decreased strength;Decreased endurance; Impaired balance;Decreased mobility; Decreased coordination; Impaired judgement;Decreased safety awareness;Orthopedic restrictions   Assessment Pt continues to utilize RW & ambulate up to household distances prior to requiring seated rest to recover  performed all transfers while maintaining NWB status  Discussed reason for NWB & importance of maintaining when pt inquired during session    At this time, recommendation is for discharge home vs rehab pending requisite home support given pt cognitive status & requisite assist for ADL management  Defer to OT assessment & recommendations for level of assist   Pt would benefit from trialing wheelchair mobility & instructions on safety since this will likely be primary mode of distance mobility until cleared for increased weight bearing on affected LE  Barriers to Discharge Decreased caregiver support   Goals   Patient Goals to get better   STG Expiration Date 03/05/21   PT Treatment Day 5   Plan   Treatment/Interventions Functional transfer training;LE strengthening/ROM; Therapeutic exercise; Endurance training;Patient/family training;Equipment eval/education; Bed mobility;Gait training   Progress Progressing toward goals   PT Frequency Other (Comment)  (3-6x/week)   Recommendation   PT Discharge Recommendation Other (Comment)  (rehab vs home pending support)   Equipment Recommended [address] Recommended Wheeled walker   AM-PAC Basic Mobility Inpatient   Turning in Bed Without Bedrails 4   Lying on Back to Sitting on Edge of Flat Bed 4   Moving Bed to Chair 4   Standing Up From Chair 4   Walk in Room 3   Climb 3-5 Stairs 2   Basic Mobility Inpatient Raw Score 21   Basic Mobility Standardized Score 45 55   The patient's AM-PAC Basic Mobility Inpatient Short Form Raw Score is 19, Standardized Score is 42 48  A standardized score less than 42 9 suggests the patient may benefit from discharge to post-acute rehabilitation services  Please also refer to the recommendation of the Physical Therapist for safe discharge planning      Jeromy Johnson PTA

## 2021-03-05 NOTE — OP NOTE
OPERATIVE REPORT - Podiatry  PATIENT NAME: Dulce Nuno  :  1950  MRN: [id number]  Pt Location: [facility] OR ROOM 04    SURGERY DATE: 3/4/2021    Surgeon(s) and Role:     * Flower Hidalgo DPM - Primary     * Austen Bynum DPM - Assisting    Pre-op Diagnosis:  Deep disruption or dehiscence of operation wound, initial encounter     Post-Op Diagnosis Codes:     * Deep disruption or dehiscence of operation wound, initial encounter [T81 32XA]    Procedure(s) (LRB):  VAC PLACEMENT; SCREW FIXATION RIGHT FOOT FUSION (Right)    Specimen(s):  ID Type Source Tests Collected by Time Destination   A : DEEP BONE RIGHT FOOT Tissue Foot, Right ANAEROBIC CULTURE AND GRAM STAIN, CULTURE, TISSUE AND GRAM STAIN Flower Hidalgo DPM 3/4/2021 194        Estimated Blood Loss:   Minimal    Drains:  Negative Pressure Wound Therapy (V A C ) Foot (Active)   Unit Type wounc vac 21 1535   Black foam- # applied 4 21   Cycle Continuous 21   Target Pressure (mmHg) 125 21   Canister Changed Yes 21   Dressing Status Clean;Dry; Intact 21 1134   Black foam- # removed 1 21 1047   Output (mL) 0 mL 21 1401   Number of days: 9       Anesthesia Type:   Choice with 20 ml of 1% Lidocaine and 0 25% Bupivacaine in a 1:1 mixture    Hemostasis:  Anatomic dissection    Materials:  Implant Name Type Inv  Item Serial No   Lot No  LRB No  Used Action   PLATE SPIDER [id number]  FRAG - [id number]  PLATE SPIDER [id number]  [employer]  Right 1 Implanted       Operative Findings:  Tissue within wound beds appeared granular, healthy, viable  No areas of deep sinus tract or purulence  Upon drilling into the talus hard bone was noted      Wounds:   Pre-debridement:   - medial:  9 x 4 x 1 cm   - lateral:  12 x 5 x 1 cm   - medial arch:  4 x 1 x 1 cm    Post- debridement:   - medial:  9 x 4 x 1 3 cm   - lateral:  12 x 5 x 1 3 cm   - medial arch:  4 x 1 x 1 3 cm    Complications: None    Procedure and Technique:     Under mild sedation, the patient was brought into the operating room and placed on the operating room table in the supine position  IV sedation was achieved by anesthesia team and a universal timeout was performed where all parties are in agreement of correct patient, correct procedure and correct site  A pneumatic tourniquet was then placed over the patient's right lower extremity with ample padding, this was not inflated for the entirety of the case  A ankle block was performed consisting of 20 ml of 0 25% Bupivacaine and 1% Lidocaine in a 1:1 mixture  The foot was then prepped and draped in the usual aseptic manner  Wounds located right foot were excisionally Surgical- Bone (CPT: 98977) debrided using curette  Wounds appeared Fresh bleeding tissue, viable and granular  100%  of wound debrided  Tissue debrided includes depth of Bone with devitalized tissue being debrided including Slough, Fibrous and Biofilm  with a small amount of bleeding bleeding was noted during procedure  Hemostasis was achieved using pressure  See wound measurements above  A wound VAC was then applied utilizing 4 black sponges, 3 in the wounds and one as a bridge  Wound vac noted to be running at 125 low continuous with good output and without leaks or blockage  At that time the foot was then prepped once again with Betadine  Attention was again directed to the right foot  Utilizing C-arm for positioning, stab incision was made in the plantar flap at the area of the 1st metatarsal stump  Under C-arm guidance a guidewire was inserted into the 1st metatarsal stump down the medial column into the body of the talus  Position was confirmed via multiple views on C-arm  At this time a drill was inserted onto the guidewire and hole for the screw was drilled down the medial column using C-arm guidance into the body of the talus where there was noted hard bone within the body of the talus    A deep bone culture was then taken utilizing the bone removed via drilling  The length of the screw was measured off the guidewire noted to be 110mm  This plantar plate noted above was placed down the guidewire down to the 1st metatarsal stump  The screw as noted above was then inserted down the guidewire through the center of the spider plate and inserted per manufacture guidelines within the medial column and into the body of the talus under C-arm guidance, noting a good bite of the screw in the talus  Positioning of the screw and plate was then confirmed via C-arm  The wound was then flushed with copious amounts of normal sterile saline  Skin edges reapproximated and skin closure was obtained utilizing 4-0 nylon in interrupted vertical mattress fashion  The foot was then cleaned and dried  The incision was then dressed with silver alginate, VAC drape  The wound vac was confirmed running at 125 low continuous with good output and without leaks or blockage  Foot was then dressed with dry sterile dressing and light Ace compression  Normal hyperemic response was noted to all digits  The patient tolerated the procedure and anesthesia well without immediate complications and transferred to PACU with vital signs stable  As with many limb salvage procedures, we contemplate the possibility of performing further stages to this procedure  Procedures may include debridements, delayed closure, plastic surgery techniques, or more proximal amputations  This procedure may be considered part of a multi-staged limb salvage treatment plan  Dr Tara Ortiz was present during the entire procedure and participated in all key aspects  SIGNATURE: Fernando Gonzalez DPM  DATE: March 4, 2021  TIME: 8:15 PM      Portions of the record may have been created with voice recognition software  Occasional wrong word or "sound a like" substitutions may have occurred due to the inherent limitations of voice recognition software   Read the chart carefully and recognize, using context, where substitutions have occurred

## 2021-03-06 LAB
ANION GAP SERPL CALCULATED.3IONS-SCNC: 5 MMOL/L (ref 4–13)
BACTERIA SPEC ANAEROBE CULT: NORMAL
BASOPHILS # BLD AUTO: 0.07 THOUSANDS/ΜL (ref 0–0.1)
BASOPHILS NFR BLD AUTO: 1 % (ref 0–1)
BUN SERPL-MCNC: 18 MG/DL (ref 5–25)
CALCIUM SERPL-MCNC: 9.2 MG/DL (ref 8.3–10.1)
CHLORIDE SERPL-SCNC: 107 MMOL/L (ref 100–108)
CO2 SERPL-SCNC: 26 MMOL/L (ref 21–32)
CREAT SERPL-MCNC: 1.07 MG/DL (ref 0.6–1.3)
EOSINOPHIL # BLD AUTO: 0.09 THOUSAND/ΜL (ref 0–0.61)
EOSINOPHIL NFR BLD AUTO: 1 % (ref 0–6)
ERYTHROCYTE [DISTWIDTH] IN BLOOD BY AUTOMATED COUNT: 14.9 % (ref 11.6–15.1)
GFR SERPL CREATININE-BSD FRML MDRD: 71 ML/MIN/1.73SQ M
GLUCOSE SERPL-MCNC: 169 MG/DL (ref 65–140)
GLUCOSE SERPL-MCNC: 200 MG/DL (ref 65–140)
GLUCOSE SERPL-MCNC: 204 MG/DL (ref 65–140)
GLUCOSE SERPL-MCNC: 248 MG/DL (ref 65–140)
GLUCOSE SERPL-MCNC: 258 MG/DL (ref 65–140)
HCT VFR BLD AUTO: 43.5 % (ref 36.5–49.3)
HGB BLD-MCNC: 13.1 G/DL (ref 12–17)
IMM GRANULOCYTES # BLD AUTO: 0.02 THOUSAND/UL (ref 0–0.2)
IMM GRANULOCYTES NFR BLD AUTO: 0 % (ref 0–2)
LYMPHOCYTES # BLD AUTO: 1.71 THOUSANDS/ΜL (ref 0.6–4.47)
LYMPHOCYTES NFR BLD AUTO: 23 % (ref 14–44)
MCH RBC QN AUTO: 30.6 PG (ref 26.8–34.3)
MCHC RBC AUTO-ENTMCNC: 30.1 G/DL (ref 31.4–37.4)
MCV RBC AUTO: 102 FL (ref 82–98)
MONOCYTES # BLD AUTO: 0.52 THOUSAND/ΜL (ref 0.17–1.22)
MONOCYTES NFR BLD AUTO: 7 % (ref 4–12)
NEUTROPHILS # BLD AUTO: 5.17 THOUSANDS/ΜL (ref 1.85–7.62)
NEUTS SEG NFR BLD AUTO: 68 % (ref 43–75)
NRBC BLD AUTO-RTO: 0 /100 WBCS
PLATELET # BLD AUTO: 425 THOUSANDS/UL (ref 149–390)
PMV BLD AUTO: 10.4 FL (ref 8.9–12.7)
POTASSIUM SERPL-SCNC: 4.1 MMOL/L (ref 3.5–5.3)
RBC # BLD AUTO: 4.28 MILLION/UL (ref 3.88–5.62)
SODIUM SERPL-SCNC: 138 MMOL/L (ref 136–145)
WBC # BLD AUTO: 7.58 THOUSAND/UL (ref 4.31–10.16)

## 2021-03-06 PROCEDURE — 97606 NEG PRS WND THER DME>50 SQCM: CPT | Performed by: PODIATRIST

## 2021-03-06 PROCEDURE — 11042 DBRDMT SUBQ TIS 1ST 20SQCM/<: CPT | Performed by: PODIATRIST

## 2021-03-06 PROCEDURE — 99233 SBSQ HOSP IP/OBS HIGH 50: CPT | Performed by: INTERNAL MEDICINE

## 2021-03-06 PROCEDURE — 80048 BASIC METABOLIC PNL TOTAL CA: CPT | Performed by: STUDENT IN AN ORGANIZED HEALTH CARE EDUCATION/TRAINING PROGRAM

## 2021-03-06 PROCEDURE — 82948 REAGENT STRIP/BLOOD GLUCOSE: CPT

## 2021-03-06 PROCEDURE — 85025 COMPLETE CBC W/AUTO DIFF WBC: CPT | Performed by: STUDENT IN AN ORGANIZED HEALTH CARE EDUCATION/TRAINING PROGRAM

## 2021-03-06 RX ADMIN — INSULIN GLARGINE 15 UNITS: 100 INJECTION, SOLUTION SUBCUTANEOUS at 21:31

## 2021-03-06 RX ADMIN — APIXABAN 5 MG: 5 TABLET, FILM COATED ORAL at 17:14

## 2021-03-06 RX ADMIN — METOPROLOL TARTRATE 100 MG: 50 TABLET, FILM COATED ORAL at 08:29

## 2021-03-06 RX ADMIN — PIPERACILLIN AND TAZOBACTAM 3.38 G: 36; 4.5 INJECTION, POWDER, FOR SOLUTION INTRAVENOUS at 08:29

## 2021-03-06 RX ADMIN — ACETAMINOPHEN 650 MG: 325 TABLET ORAL at 15:59

## 2021-03-06 RX ADMIN — PIPERACILLIN AND TAZOBACTAM 3.38 G: 36; 4.5 INJECTION, POWDER, FOR SOLUTION INTRAVENOUS at 14:19

## 2021-03-06 RX ADMIN — APIXABAN 5 MG: 5 TABLET, FILM COATED ORAL at 08:29

## 2021-03-06 RX ADMIN — ATORVASTATIN CALCIUM 40 MG: 40 TABLET, FILM COATED ORAL at 08:29

## 2021-03-06 RX ADMIN — PIPERACILLIN AND TAZOBACTAM 3.38 G: 36; 4.5 INJECTION, POWDER, FOR SOLUTION INTRAVENOUS at 02:01

## 2021-03-06 RX ADMIN — METOPROLOL TARTRATE 100 MG: 50 TABLET, FILM COATED ORAL at 20:39

## 2021-03-06 RX ADMIN — LISINOPRIL 40 MG: 20 TABLET ORAL at 08:29

## 2021-03-06 RX ADMIN — PIPERACILLIN AND TAZOBACTAM 3.38 G: 36; 4.5 INJECTION, POWDER, FOR SOLUTION INTRAVENOUS at 20:42

## 2021-03-06 RX ADMIN — SENNOSIDES, DOCUSATE SODIUM 1 TABLET: 8.6; 5 TABLET ORAL at 21:32

## 2021-03-06 NOTE — PROGRESS NOTES
Progress Note - Infectious Disease   Arch Maine  78 y o  male MRN: [id number]  Unit/Bed#: [facility] 624-01 Encounter: [id number]      Impression/Plan:     1- Postoperative bleeding  complicated by hematoma formation,  compartment syndrome,  with superimposed infection:  Patient was taken to operation room 2/17/2021 where he underwent external fixator removal, hematoma evacuation, fasciotomy   Small amount of purulence noted   Wound cultures collected intraoperatively from lateral and medial aspect of the foot both growing Enterobacter cloaca   E faecalis  also growing in 1 operative culture specimen  Due to progressive ischemia, patient was taken again to OR 02/22/2021 where he underwent TMA   Operative report reviewed and case discussed with podiatrist Dr Alise Garcia was removed   All infected tissue removed   Patient had been on IV Zosyn previously   With hardware removal, he was transition to amoxicillin/doxycycline  Patient underwent I&D with screw fixation of right foot on 03/04  Bone was hard and normal at surgery  however, bone scraping culture is now growing Enterobacter  Patient will need long-term IV/IV equivalent antibiotic, given insertion hardware in potentially infected bone  For now, will continue IV Zosyn  If bone culture only has growth of Enterobacter and no Enterococcus, we may be able to treat patient with high-dose p o  Fluoroquinolones  - continue IV Zosyn for now  - follow-up on operative cultures  - patient will need long-term IV/IV equivalent antibiotic          2- diabetes mellitus type 2, A1c 7 8%   Risk factor for infectious complications as mentioned above  -  management as per primary service team        3- atrial fibrillation:  -rate control and anticoagulation as per primary service team     Discussed with the patient in detail regarding the above plan  Discussed with Podiatry service      Antibiotics:  Zosyn  POD # 2      Subjective:  Patient feels well    Leg pain mild and well controlled  Temperature stays down   No chills  He is tolerating antibiotics well   No nausea, vomiting or diarrhea        Objective:  Vitals:  Temp:  [97 4 °F (36 3 °C)-98 9 °F (37 2 °C)] 97 4 °F (36 3 °C)  HR:  [] 87  Resp:  [16-18] 18  BP: ()/(58-85) 145/83  SpO2:  [95 %-98 %] 98 %  Temp (24hrs), Av 3 °F (36 8 °C), Min:97 4 °F (36 3 °C), Max:98 9 °F (37 2 °C)  Current: Temperature: (!) 97 4 °F (36 3 °C)    Physical Exam:     General: Awake, alert, cooperative, no distress  Neck:  Supple  No mass  No lymphadenopathy  Lungs: Expansion symmetric, no rales, no wheezing, respirations unlabored  Heart:  Regular rate and rhythm, S1 and S2 normal, no murmur  Abdomen: Soft, nondistended, non-tender, bowel sounds active all four quadrants, no masses, no organomegaly  Extremities: No edema  Right foot wound with VAC in place  No erythema/warmth  Minimal tenderness  Skin:  No rash  Neuro: Moves all extremities  Invasive Devices     Peripheral Intravenous Line            Peripheral IV 21 Left Antecubital 1 day          Drain            Negative Pressure Wound Therapy (V A C ) Foot 10 days                Labs studies:   I have personally reviewed pertinent labs  Results from last 7 days   Lab Units 21  1137   POTASSIUM mmol/L 4 1   CHLORIDE mmol/L 107   CO2 mmol/L 26   BUN mg/dL 18   CREATININE mg/dL 1 07   EGFR ml/min/1 73sq m 71   CALCIUM mg/dL 9 2     Results from last 7 days   Lab Units 21  1139   WBC Thousand/uL 7 58   HEMOGLOBIN g/dL 13 1   PLATELETS Thousands/uL 425*     Results from last 7 days   Lab Units 21  1944   GRAM STAIN RESULT  No Polys or Bacteria seen       Imaging Studies:   I have personally reviewed pertinent imaging study reports and images in PACS  EKG, Pathology, and Other Studies:   I have personally reviewed pertinent reports

## 2021-03-06 NOTE — NUTRITION
03/05/21 1554   Recommendations/Interventions   Interventions Supplement initiate  (Initiate Man bid-orange flavor at breakfast/fruit punch at dinner to promote wound healing)   Please give RD protocol with diet order and order Ludy Vasquez at breakfast & Fruit punch Man at supper  for wound healing

## 2021-03-06 NOTE — CASE MANAGEMENT
Cm spoke with Podiatry resident Keyla Velasquez, pt stable for d/c whenever we have an answer from the facility  Vac paperwork already completed and uploaded to [facility] to reevaluate and advise of bed availability on Monday  Updates sent to [facility] and [facility]

## 2021-03-06 NOTE — PROGRESS NOTES
Podiatry - Progress Note  Patient: Wai Robles  78 y o  male   MRN: [id number]  PCP: Geena Rapp MD  Unit/Bed#: [facility] 701-01 Encounter: [id number]  Date Of Visit: 21    ASSESSMENT:    Wai Robles  is a 78 y o  male with:    1  Right foot hematoma, surgical site infection, ischemia  - S/p exfix removal, I&D, fasciotomy (DOS 21)  - s/p TMA of left foot   - s/p debridement and vac application per Plastics 3/1/21  - s/p Percutaneous screw fixation right medial column, wound debridement and wound vac change (3/4/21)  2  Congenital talipes equinovarus right foot             - s/p midfoot arthrodesis, exfix application (DOS )  3  T2Dm, last A1C 7 8 %  4  HTN  5  Altered mental status  6  Constipation  7  atrial fibrillation  8  Sepsis on admission - resolved    PLAN:    · Wound VAC changed as described below:  · Wound debridement performed as described below:  · New wound VAC applied, currently running with no leaks or blockages at 125mmHg low continuous  · Patient remains afebrile  F/u CBC, BMP  · Continue current pain management regimen  · Preliminary deep culture results from OR 3/4/21 growing few colonies of E  cloacae  Continue Zosyn per infectious disease team  Will determine discharge plans based on final cultures  · Elevation on green foam wedges or pillows when non-ambulatory  · Appreciate consulting services for recommendations and management  Wound VAC application  1  Number of sponges: 4  2  Pressure settin continuous  3  Size of wound: 7 5 x 4 0 x 1 5 cm (medial), 8 0 x 5 0 x 1 5 cm (lateral), 2 5 x 0 8 x 1 0 cm (medial arch)  4  Description of wound: As described below    A Verbal time out was performed confirming correct patient and extremity       Nursing was communicated with regarding number of sponges removed and implanted    Wound debridement note: After verbal consent was obtained, wound located at the distal medial and distal lateral aspects of the patient's right foot were excisionally debrided with a #15 blade of all nonviable, devitalized, fibrous tissue w/ excision of skin, fascia, subcutaneous tissue to depth of  bone  Post debridement wound measurements 7 5x4 0x1 5cm (medial) and 8 0 x 5 0 x 1 5cm (lateral), with appearance of wound fresh bleeding and mostly granular with viable 90% vs nonviable 10%, <20sq cm debrided  Antibiotics started: Zosyn  Pharmacologic VTE Prophylaxis: Apixaban  Mechanical VTE Prophylaxis: sequential compression device   Weightbearing status: NWB    Disposition:  Patient requires continued stay for pending culture results and IV abx  SUBJECTIVE:     The patient was seen, evaluated, and assessed at bedside today  The patient was awake, alert, and in no acute distress  No acute events overnight  The patient reports an occasional sharp pain from the right foot, especially with dressing changes or debridement  He does state that his current pain level is tolerable overall and well controlled with his medication regimen  Patient denies N/V/F/chills/SOB/CP  OBJECTIVE:     Vitals:   /83   Pulse 87   Temp (!) 97 4 °F (36 3 °C)   Resp 18   Ht 6' 4" (1 93 m)   Wt 109 kg (240 lb 15 4 oz)   SpO2 98%   BMI 29 33 kg/m²     Temp (24hrs), Av 3 °F (36 8 °C), Min:97 4 °F (36 3 °C), Max:98 9 °F (37 2 °C)      Physical Exam:     General: Alert, cooperative and no distress  Lungs: Non labored breathing  Abdomen: Soft, non-tender  Lower extremity exam:  Cardiovascular status at baseline  Neurological status at baseline  Musculoskeletal status at baseline  No calf tenderness noted  Sutures at the distal/central portion of the patient's TMA site are intact with edges well coapted and no signs of maceration or dehiscence  Similarly, the incision site at the medial aspect of the patient's foot is well coapted with no maceration or dehiscence and no local signs of infection  All sutures are in place and intact   Open areas described below    Lower extremity wound(s) as noted below:  Wound #: 1  Location: right foot, distal medial TMA stump  Length 7 5cm: Width 4 0cm: Depth 1 5cm:   Deepest Tissue Noted in Base: bone  Probe to Bone: Yes  Peripheral Skin Description: Attached  Granulation: 70% Fibrotic Tissue: 30% Necrotic Tissue: 0%   Drainage Amount: moderate, serosanguinous  Signs of Infection: No    Wound #: 2  Location: right foot, distal lateral TMA stump  Length 8 0cm: Width 5 0cm: Depth 1 5cm:   Deepest Tissue Noted in Base: bone  Probe to Bone: Yes  Peripheral Skin Description: Attached  Granulation: 80% Fibrotic Tissue: 20% Necrotic Tissue: 0%   Drainage Amount: moderate, serosanguinous  Signs of Infection: No    Wound #: 3  Location: right foot, plantar medial foot  Length 2 5cm: Width 0 8cm: Depth 1 0cm:   Deepest Tissue Noted in Base: subcutaneous tissue  Probe to Bone: No  Peripheral Skin Description: Attached  Granulation: 90% Fibrotic Tissue: 10% Necrotic Tissue: 0%   Drainage Amount: minimal, serosanguinous  Signs of Infection: No    Clinical Images 03/06/21:                      Additional Data:     Labs: CBC, BMP pending              Invalid input(s): LABALBU        * I Have Reviewed All Lab Data Listed Above  Recent Cultures (last 7 days):     Results from last 7 days   Lab Units 03/04/21 1944   GRAM STAIN RESULT  No Polys or Bacteria seen     Results from last 7 days   Lab Units 03/04/21 1944   ANAEROBIC CULTURE  Culture results to follow  Imaging: I have personally reviewed pertinent films in PACS  EKG, Pathology, and Other Studies: I have personally reviewed pertinent reports  ** Please Note: Portions of the record may have been created with voice recognition software  Occasional wrong word or "sound a like" substitutions may have occurred due to the inherent limitations of voice recognition software  Read the chart carefully and recognize, using context, where substitutions have occurred   **

## 2021-03-07 LAB
BACTERIA TISS AEROBE CULT: ABNORMAL
GLUCOSE SERPL-MCNC: 147 MG/DL (ref 65–140)
GLUCOSE SERPL-MCNC: 247 MG/DL (ref 65–140)
GLUCOSE SERPL-MCNC: 273 MG/DL (ref 65–140)
GLUCOSE SERPL-MCNC: 282 MG/DL (ref 65–140)
GLUCOSE SERPL-MCNC: 301 MG/DL (ref 65–140)
GRAM STN SPEC: ABNORMAL

## 2021-03-07 PROCEDURE — 99233 SBSQ HOSP IP/OBS HIGH 50: CPT | Performed by: INTERNAL MEDICINE

## 2021-03-07 PROCEDURE — NC001 PR NO CHARGE: Performed by: PODIATRIST

## 2021-03-07 PROCEDURE — 82948 REAGENT STRIP/BLOOD GLUCOSE: CPT

## 2021-03-07 RX ADMIN — ATORVASTATIN CALCIUM 40 MG: 40 TABLET, FILM COATED ORAL at 08:47

## 2021-03-07 RX ADMIN — PIPERACILLIN AND TAZOBACTAM 3.38 G: 36; 4.5 INJECTION, POWDER, FOR SOLUTION INTRAVENOUS at 02:28

## 2021-03-07 RX ADMIN — METOPROLOL TARTRATE 100 MG: 50 TABLET, FILM COATED ORAL at 21:50

## 2021-03-07 RX ADMIN — INSULIN GLARGINE 15 UNITS: 100 INJECTION, SOLUTION SUBCUTANEOUS at 21:49

## 2021-03-07 RX ADMIN — APIXABAN 5 MG: 5 TABLET, FILM COATED ORAL at 08:50

## 2021-03-07 RX ADMIN — CEFEPIME HYDROCHLORIDE 2000 MG: 2 INJECTION, POWDER, FOR SOLUTION INTRAVENOUS at 14:11

## 2021-03-07 RX ADMIN — LISINOPRIL 40 MG: 20 TABLET ORAL at 08:47

## 2021-03-07 RX ADMIN — METOPROLOL TARTRATE 100 MG: 50 TABLET, FILM COATED ORAL at 08:47

## 2021-03-07 RX ADMIN — PIPERACILLIN AND TAZOBACTAM 3.38 G: 36; 4.5 INJECTION, POWDER, FOR SOLUTION INTRAVENOUS at 08:49

## 2021-03-07 RX ADMIN — APIXABAN 5 MG: 5 TABLET, FILM COATED ORAL at 17:23

## 2021-03-07 NOTE — PROGRESS NOTES
Progress Note - Infectious Disease   Niraj Romero  78 y o  male MRN: [id number]  Unit/Bed#: [facility] 624-01 Encounter: [id number]      Impression/Plan:     1- Postoperative bleeding  complicated by hematoma formation,  compartment syndrome,  with superimposed infection:  Patient was taken to operation room 2/17/2021 where he underwent external fixator removal, hematoma evacuation, fasciotomy   Small amount of purulence noted   Wound cultures collected intraoperatively from lateral and medial aspect of the foot both growing Enterobacter cloaca   E faecalis  also growing in 1 operative culture specimen  Due to progressive ischemia, patient was taken again to OR 02/22/2021 where he underwent TMA   Operative report reviewed and case discussed with podiatrist Dr Mickie Lagunas was removed   All infected tissue removed   Patient had been on IV Zosyn previously   With hardware removal, he was transition to amoxicillin/doxycycline  Patient underwent I&D with screw fixation of right foot on 03/04  Daniele Palm was hard and normal at surgery   However, bone scraping culture is now growing Enterobacter, which is now resistant to Zosyn but susceptible to cefepime and fluoroquinolones  No further growth of Enterococcus  Will change antibiotic to IV cefepime  Patient may be able to be treated with p o  Levaquin rather than IV cefepime  However, there is significant risk of toxicities with Levaquin   - change antibiotic to IV cefepime for now  - follow-up on final operative cultures  -consider high-dose p o   Levaquin rather than IV cefepime but will need to discuss with patient risks and benefits of each approach  -treat x6 weeks total          2- diabetes mellitus type 2, A1c 7 8%   Risk factor for infectious complications as mentioned above  -  management as per primary service team        3- atrial fibrillation:  -rate control and anticoagulation as per primary service team     Discussed with the patient in detail regarding the above plan  Discussed with Podiatry service      Antibiotics:  Zosyn  POD # 3      Subjective:  Patient feels well  Leg pain mild and well controlled  Temperature stays down   No chills  He is tolerating antibiotics well   No nausea, vomiting or diarrhea        Objective:  Vitals:  Temp:  [98 3 °F (36 8 °C)-98 7 °F (37 1 °C)] 98 7 °F (37 1 °C)  HR:  [82-92] 86  Resp:  [16-18] 18  BP: (108-147)/(72-83) 119/83  SpO2:  [97 %-98 %] 98 %  Temp (24hrs), Av 5 °F (36 9 °C), Min:98 3 °F (36 8 °C), Max:98 7 °F (37 1 °C)  Current: Temperature: 98 7 °F (37 1 °C)    Physical Exam:     General: Awake, alert, cooperative, no distress  Neck:  Supple  No mass  No lymphadenopathy  Lungs: Expansion symmetric, no rales, no wheezing, respirations unlabored  Heart:  Regular rate and rhythm, S1 and S2 normal, no murmur  Abdomen: Soft, nondistended, non-tender, bowel sounds active all four quadrants, no masses, no organomegaly  Extremities: Stable mild leg edema  Left leg wound with VAC  No purulence  No erythema/warmth  Mild tenderness  Skin:  No rash  Neuro: Moves all extremities  Invasive Devices     Peripheral Intravenous Line            Peripheral IV 21 Left Antecubital 2 days          Drain            Negative Pressure Wound Therapy (V A C ) Foot 11 days                Labs studies:   I have personally reviewed pertinent labs  Results from last 7 days   Lab Units 21  1137   POTASSIUM mmol/L 4 1   CHLORIDE mmol/L 107   CO2 mmol/L 26   BUN mg/dL 18   CREATININE mg/dL 1 07   EGFR ml/min/1 73sq m 71   CALCIUM mg/dL 9 2     Results from last 7 days   Lab Units 21  1139   WBC Thousand/uL 7 58   HEMOGLOBIN g/dL 13 1   PLATELETS Thousands/uL 425*     Results from last 7 days   Lab Units 21  1944   GRAM STAIN RESULT  No Polys or Bacteria seen       Imaging Studies:   I have personally reviewed pertinent imaging study reports and images in PACS      EKG, Pathology, and Other Studies: I have personally reviewed pertinent reports

## 2021-03-07 NOTE — PLAN OF CARE
Problem: Potential for Falls  Goal: Patient will remain free of falls  Description: INTERVENTIONS:  - Assess patient frequently for physical needs  -  Identify cognitive and physical deficits and behaviors that affect risk of falls    -  [place] fall precautions as indicated by assessment   - Educate patient/family on patient safety including physical limitations  - Instruct patient to call for assistance with activity based on assessment  - Modify environment to reduce risk of injury  - Consider OT/PT consult to assist with strengthening/mobility  Outcome: Progressing     Problem: Prexisting or High Potential for Compromised Skin Integrity  Goal: Skin integrity is maintained or improved  Description: INTERVENTIONS:  - Identify patients at risk for skin breakdown  - Assess and monitor skin integrity  - Assess and monitor nutrition and hydration status  - Monitor labs   - Assess for incontinence   - Turn and reposition patient  - Assist with mobility/ambulation  - Relieve pressure over bony prominences  - Avoid friction and shearing  - Provide appropriate hygiene as needed including keeping skin clean and dry  - Evaluate need for skin moisturizer/barrier cream  - Collaborate with interdisciplinary team   - Patient/family teaching  - Consider wound care consult   Outcome: Progressing     Problem: PAIN - ADULT  Goal: Verbalizes/displays adequate comfort level or baseline comfort level  Description: Interventions:  - Encourage patient to monitor pain and request assistance  - Assess pain using appropriate pain scale  - Administer analgesics based on type and severity of pain and evaluate response  - Implement non-pharmacological measures as appropriate and evaluate response  - Consider cultural and social influences on pain and pain management  - Notify physician/advanced practitioner if interventions unsuccessful or patient reports new pain  Outcome: Progressing     Problem: INFECTION - ADULT  Goal: Absence or prevention of progression during hospitalization  Description: INTERVENTIONS:  - Assess and monitor for signs and symptoms of infection  - Monitor lab/diagnostic results  - Monitor all insertion sites, i e  indwelling lines, tubes, and drains  - Monitor endotracheal if appropriate and nasal secretions for changes in amount and color  - [place] appropriate cooling/warming therapies per order  - Administer medications as ordered  - Instruct and encourage patient and family to use good hand hygiene technique  - Identify and instruct in appropriate isolation precautions for identified infection/condition  Outcome: Progressing     Problem: SAFETY ADULT  Goal: Patient will remain free of falls  Description: INTERVENTIONS:  - Assess patient frequently for physical needs  -  Identify cognitive and physical deficits and behaviors that affect risk of falls    -  [place] fall precautions as indicated by assessment   - Educate patient/family on patient safety including physical limitations  - Instruct patient to call for assistance with activity based on assessment  - Modify environment to reduce risk of injury  - Consider OT/PT consult to assist with strengthening/mobility  Outcome: Progressing  Goal: Maintain or return to baseline ADL function  Description: INTERVENTIONS:  -  Assess patient's ability to carry out ADLs; assess patient's baseline for ADL function and identify physical deficits which impact ability to perform ADLs (bathing, care of mouth/teeth, toileting, grooming, dressing, etc )  - Assess/evaluate cause of self-care deficits   - Assess range of motion  - Assess patient's mobility; develop plan if impaired  - Assess patient's need for assistive devices and provide as appropriate  - Encourage maximum independence but intervene and supervise when necessary  - Involve family in performance of ADLs  - Assess for home care needs following discharge   - Consider OT consult to assist with ADL evaluation and planning for discharge  - Provide patient education as appropriate  Outcome: Progressing  Goal: Maintain or return mobility status to optimal level  Description: INTERVENTIONS:  - Assess patient's baseline mobility status (ambulation, transfers, stairs, etc )    - Identify cognitive and physical deficits and behaviors that affect mobility  - Identify mobility aids required to assist with transfers and/or ambulation (gait belt, sit-to-stand, lift, walker, cane, etc )  - [place] fall precautions as indicated by assessment  - Record patient progress and toleration of activity level on Mobility SBAR; progress patient to next Phase/Stage  - Instruct patient to call for assistance with activity based on assessment  - Consider rehabilitation consult to assist with strengthening/weightbearing, etc   Outcome: Progressing

## 2021-03-07 NOTE — PROGRESS NOTES
Podiatry - Progress Note  Patient: Bean Sandoval  78 y o  male   MRN: [id number]  PCP: Alisia Marroquin MD  Unit/Bed#: [facility] 616-00 Encounter: [id number]  Date Of Visit: 03/07/21    ASSESSMENT:    Bean Sandoval  is a 78 y o  male with:    1  Right foot hematoma, surgical site infection, ischemia  - S/p exfix removal, I&D, fasciotomy (DOS 2/17/21)  - s/p TMA of left foot 2/22  - s/p debridement and vac application per Plastics 3/1/21  - s/p Percutaneous screw fixation right medial column, wound debridement and wound vac change (3/4/21)  2  Congenital talipes equinovarus right foot             - s/p midfoot arthrodesis, exfix application (DOS 4/53/14)  3  T2Dm, last A1C 7 8 %  4  HTN  5  Altered mental status  6  Constipation  7  atrial fibrillation  8  Sepsis on admission - resolved    PLAN:    · Wound VAC running at 125mmHg low continuous with no leaks or blockages noted  Will be changed tomorrow  · Patient remains afebrile with no leukocytosis noted  Check CBC, BMP q o d  · Continue current pain management regimen  · Final cultures show growth of E  cloacae  Per ID, patient will likely require long-term IV/IV equivalent antibiotic  Patient switched to IV cefepime per ID  Will follow-up with their team regarding plan for long-term antibiotics as an outpatient  · Elevation on green foam wedges or pillows when non-ambulatory  · Appreciate consulting services for recommendations and management  Antibiotics started: cefepime  Pharmacologic VTE Prophylaxis: Apixaban  Mechanical VTE Prophylaxis: sequential compression device   Weightbearing status: NWB to RLE    Disposition:  Patient requires continued stay for IV Abx and results of final intra-op cultures  SUBJECTIVE:     The patient was seen, evaluated, and assessed at bedside today  The patient was awake, alert, and in no acute distress  No acute events overnight  The patient reports no pain to his RLE  Patient denies N/V/F/chills/SOB/CP        OBJECTIVE: Vitals:   /83   Pulse 86   Temp 98 7 °F (37 1 °C) (Oral)   Resp 18   Ht 6' 4" (1 93 m)   Wt 109 kg (240 lb 15 4 oz)   SpO2 98%   BMI 29 33 kg/m²     Temp (24hrs), Av 5 °F (36 9 °C), Min:98 3 °F (36 8 °C), Max:98 7 °F (37 1 °C)      Physical Exam:     General: Alert, cooperative and no distress  Lungs: Non labored breathing  Abdomen: Soft, non-tender  Lower extremity exam:  Cardiovascular status at baseline  Neurological status at baseline  Musculoskeletal status at baseline  No calf tenderness noted  Wound VAC running at 125mmHg low continuous with no leaks or blockages noted  Dressings to RLE are clean, dry, and intact with no strikethrough noted  Additional Data:     Labs:    Results from last 7 days   Lab Units 21  1139   WBC Thousand/uL 7 58   HEMOGLOBIN g/dL 13 1   HEMATOCRIT % 43 5   PLATELETS Thousands/uL 425*   NEUTROS PCT % 68   LYMPHS PCT % 23   MONOS PCT % 7   EOS PCT % 1     Results from last 7 days   Lab Units 21  1137   POTASSIUM mmol/L 4 1   CHLORIDE mmol/L 107   CO2 mmol/L 26   BUN mg/dL 18   CREATININE mg/dL 1 07   CALCIUM mg/dL 9 2           * I Have Reviewed All Lab Data Listed Above  Recent Cultures (last 7 days):     Results from last 7 days   Lab Units 21   GRAM STAIN RESULT  No Polys or Bacteria seen     Results from last 7 days   Lab Units 21   ANAEROBIC CULTURE  No anaerobes isolated       Imaging: I have personally reviewed pertinent films in PACS  EKG, Pathology, and Other Studies: I have personally reviewed pertinent reports  ** Please Note: Portions of the record may have been created with voice recognition software  Occasional wrong word or "sound a like" substitutions may have occurred due to the inherent limitations of voice recognition software  Read the chart carefully and recognize, using context, where substitutions have occurred   **

## 2021-03-08 LAB
ANION GAP SERPL CALCULATED.3IONS-SCNC: 5 MMOL/L (ref 4–13)
BASOPHILS # BLD AUTO: 0.04 THOUSANDS/ΜL (ref 0–0.1)
BASOPHILS NFR BLD AUTO: 1 % (ref 0–1)
BUN SERPL-MCNC: 12 MG/DL (ref 5–25)
CALCIUM SERPL-MCNC: 8.8 MG/DL (ref 8.3–10.1)
CHLORIDE SERPL-SCNC: 112 MMOL/L (ref 100–108)
CO2 SERPL-SCNC: 25 MMOL/L (ref 21–32)
CREAT SERPL-MCNC: 0.9 MG/DL (ref 0.6–1.3)
EOSINOPHIL # BLD AUTO: 0.16 THOUSAND/ΜL (ref 0–0.61)
EOSINOPHIL NFR BLD AUTO: 2 % (ref 0–6)
ERYTHROCYTE [DISTWIDTH] IN BLOOD BY AUTOMATED COUNT: 14.6 % (ref 11.6–15.1)
GFR SERPL CREATININE-BSD FRML MDRD: 87 ML/MIN/1.73SQ M
GLUCOSE SERPL-MCNC: 161 MG/DL (ref 65–140)
GLUCOSE SERPL-MCNC: 164 MG/DL (ref 65–140)
GLUCOSE SERPL-MCNC: 193 MG/DL (ref 65–140)
GLUCOSE SERPL-MCNC: 225 MG/DL (ref 65–140)
GLUCOSE SERPL-MCNC: 264 MG/DL (ref 65–140)
HCT VFR BLD AUTO: 36.9 % (ref 36.5–49.3)
HGB BLD-MCNC: 11.5 G/DL (ref 12–17)
IMM GRANULOCYTES # BLD AUTO: 0.02 THOUSAND/UL (ref 0–0.2)
IMM GRANULOCYTES NFR BLD AUTO: 0 % (ref 0–2)
LYMPHOCYTES # BLD AUTO: 2.16 THOUSANDS/ΜL (ref 0.6–4.47)
LYMPHOCYTES NFR BLD AUTO: 31 % (ref 14–44)
MCH RBC QN AUTO: 29.9 PG (ref 26.8–34.3)
MCHC RBC AUTO-ENTMCNC: 31.2 G/DL (ref 31.4–37.4)
MCV RBC AUTO: 96 FL (ref 82–98)
MONOCYTES # BLD AUTO: 0.51 THOUSAND/ΜL (ref 0.17–1.22)
MONOCYTES NFR BLD AUTO: 7 % (ref 4–12)
NEUTROPHILS # BLD AUTO: 3.99 THOUSANDS/ΜL (ref 1.85–7.62)
NEUTS SEG NFR BLD AUTO: 59 % (ref 43–75)
NRBC BLD AUTO-RTO: 0 /100 WBCS
PLATELET # BLD AUTO: 376 THOUSANDS/UL (ref 149–390)
PMV BLD AUTO: 10.8 FL (ref 8.9–12.7)
POTASSIUM SERPL-SCNC: 4.1 MMOL/L (ref 3.5–5.3)
RBC # BLD AUTO: 3.85 MILLION/UL (ref 3.88–5.62)
SODIUM SERPL-SCNC: 142 MMOL/L (ref 136–145)
WBC # BLD AUTO: 6.88 THOUSAND/UL (ref 4.31–10.16)

## 2021-03-08 PROCEDURE — 97606 NEG PRS WND THER DME>50 SQCM: CPT | Performed by: PODIATRIST

## 2021-03-08 PROCEDURE — 97530 THERAPEUTIC ACTIVITIES: CPT

## 2021-03-08 PROCEDURE — 97116 GAIT TRAINING THERAPY: CPT

## 2021-03-08 PROCEDURE — 99232 SBSQ HOSP IP/OBS MODERATE 35: CPT | Performed by: INTERNAL MEDICINE

## 2021-03-08 PROCEDURE — 97168 OT RE-EVAL EST PLAN CARE: CPT

## 2021-03-08 PROCEDURE — 80048 BASIC METABOLIC PNL TOTAL CA: CPT | Performed by: STUDENT IN AN ORGANIZED HEALTH CARE EDUCATION/TRAINING PROGRAM

## 2021-03-08 PROCEDURE — 82948 REAGENT STRIP/BLOOD GLUCOSE: CPT

## 2021-03-08 PROCEDURE — 99024 POSTOP FOLLOW-UP VISIT: CPT | Performed by: PODIATRIST

## 2021-03-08 PROCEDURE — 85025 COMPLETE CBC W/AUTO DIFF WBC: CPT | Performed by: STUDENT IN AN ORGANIZED HEALTH CARE EDUCATION/TRAINING PROGRAM

## 2021-03-08 RX ADMIN — LISINOPRIL 40 MG: 20 TABLET ORAL at 08:35

## 2021-03-08 RX ADMIN — APIXABAN 5 MG: 5 TABLET, FILM COATED ORAL at 08:35

## 2021-03-08 RX ADMIN — INSULIN GLARGINE 15 UNITS: 100 INJECTION, SOLUTION SUBCUTANEOUS at 21:13

## 2021-03-08 RX ADMIN — CEFEPIME HYDROCHLORIDE 2000 MG: 2 INJECTION, POWDER, FOR SOLUTION INTRAVENOUS at 02:27

## 2021-03-08 RX ADMIN — METOPROLOL TARTRATE 100 MG: 50 TABLET, FILM COATED ORAL at 08:35

## 2021-03-08 RX ADMIN — ATORVASTATIN CALCIUM 40 MG: 40 TABLET, FILM COATED ORAL at 08:35

## 2021-03-08 RX ADMIN — APIXABAN 5 MG: 5 TABLET, FILM COATED ORAL at 17:57

## 2021-03-08 RX ADMIN — CEFEPIME HYDROCHLORIDE 2000 MG: 2 INJECTION, POWDER, FOR SOLUTION INTRAVENOUS at 13:30

## 2021-03-08 RX ADMIN — METOPROLOL TARTRATE 100 MG: 50 TABLET, FILM COATED ORAL at 21:13

## 2021-03-08 NOTE — PROGRESS NOTES
[facility] Podiatry - Progress Note  Patient: Kary Vaca  78 y o  male   MRN: [id number]  PCP: Yun Collins MD  [facility] Encounter: [id number]  Date Of Visit: 21    ASSESSMENT:    Kary Vaca  is a 78 y o  male with:    1  Right foot hematoma, surgical site infection, ischemia  - S/p exfix removal, I&D, fasciotomy (DOS 21)  - s/p TMA of left foot   - s/p debridement and vac application per Plastics (3/1/21)  - s/p Percutaneous screw fixation right medial column, wound debridement and wound vac change (3/4/21)  2  Congenital talipes equinovarus right foot             - s/p midfoot arthrodesis, exfix application (DOS )  3  T2Dm, last A1C 7 8 %  4  HTN  5  Altered mental status  6  Atrial fibrillation  7  Sepsis on admission - resolved      PLAN:    · Complete wound vac dressing change performed today  See details below  · Continue antibiotics per ID  Possible transition to oral high dose levaquin regimen on discharge  Discussion to be had between ID and patient/family regarding possible side effects and risks  · CM following for placement  · Appreciate consulting services for recs  Weight bearing status: NWB RLE  DVT prophylaxis: Eliquis  Antibiotics: Cefepime    Wound VAC application  1  Number of sponges: Removed 4 black/ Applied 5 black  2  Pressure settin continuous  3  Size of wound: 7 5 x 4 0 x 1 5 cm (medial), 8 0 x 5 0 x 1 5 cm (lateral), 2 5 x 0 8 x 1 0 cm (medial arch)  4  Description of wound: fibrogranular, no drainage    Disposition: Patient will require continued inpatient stay for the above  Anticipate discharge soon pending placement  SUBJECTIVE:     The patient was seen, evaluated, and assessed at bedside today  The patient was awake, alert, and in no acute distress  No acute events overnight  Patient denies N/V/F/chills/SOB/CP        OBJECTIVE:     Vitals:   /98   Pulse 80   Temp 99 1 °F (37 3 °C)   Resp 16   Ht 6' 4" (1 93 m)   Wt 109 kg (240 lb 15 4 oz)   SpO2 97%   BMI 29 33 kg/m²     Temp (24hrs), Av °F (37 2 °C), Min:98 7 °F (37 1 °C), Max:99 1 °F (37 3 °C)      Physical Exam:     General:  Alert, cooperative, and in no distress  Lower extremity exam:  Cardiovascular status at baseline  Neurological status at baseline  Musculoskeletal status at baseline  No calf tenderness noted  Wound (1) located medial foot, measures approximately 2 5 cm x 0 8 cm x 0 1 cm  without sinus tracking or undermining  Wound bed appears pink/red and fibrotic with no drainage  The wound base is 40% red/granular, 60% yellow/fibrous, 0% black/necrotic  Deepest tissue noted in base is subq  Malodor is not noticed  Wound edge appears Attached  [place]-wound skin appears intact  Probe to bone is negative   Signs of infection are not present at this time  Wound (2) located dorsal foot, measures approximately 7 5 cm x 4 cm x 1 5 cm  without sinus tracking or undermining  Wound bed appears pink/red and fibrotic with no drainage  The wound base is 30% red/granular, 70% yellow/fibrous, 0% black/necrotic  Deepest tissue noted in base is subq  Malodor is not noticed  Wound edge appears Attached  [place]-wound skin appears intact  Probe to bone is negative   Signs of infection are not present at this time  Wound (3) located lateral foot, measures approximately 8 cm x 5 cm x 1 5 cm  without sinus tracking or undermining  Wound bed appears pink/red and fibrotic with no drainage  The wound base is 50% red/granular, 50% yellow/fibrous, 0% black/necrotic  Deepest tissue noted in base is subq  Malodor is not noticed  Wound edge appears Attached  [place]-wound skin appears intact  Probe to bone is negative   Signs of infection are not present at this time       Clinical Images 21:    Medial foot      Dorsal foot      Lateral foot    Additional Data:     Labs:    Results from last 7 days   Lab Units 21  0534   WBC Thousand/uL 6 88   HEMOGLOBIN g/dL 11 5* HEMATOCRIT % 36 9   PLATELETS Thousands/uL 376   NEUTROS PCT % 59   LYMPHS PCT % 31   MONOS PCT % 7   EOS PCT % 2     Results from last 7 days   Lab Units 03/08/21  0534   POTASSIUM mmol/L 4 1   CHLORIDE mmol/L 112*   CO2 mmol/L 25   BUN mg/dL 12   CREATININE mg/dL 0 90   CALCIUM mg/dL 8 8           * I Have Reviewed All Lab Data Listed Above  Recent Cultures (last 7 days):     Results from last 7 days   Lab Units 03/04/21 1944   GRAM STAIN RESULT  No Polys or Bacteria seen     Results from last 7 days   Lab Units 03/04/21 1944   ANAEROBIC CULTURE  No anaerobes isolated       Imaging: I have personally reviewed pertinent films in PACS  EKG, Pathology, and Other Studies: I have personally reviewed pertinent reports  ** Please Note: Portions of the record may have been created with voice recognition software  Occasional wrong word or "sound a like" substitutions may have occurred due to the inherent limitations of voice recognition software  Read the chart carefully and recognize, using context, where substitutions have occurred   **

## 2021-03-08 NOTE — UTILIZATION REVIEW
Continued Stay Review    Date: 3/8/2021                       Current Patient Class: IP  Current Level of Care: med/surg  HPI:70 y o  male initially admitted on on 2/16/2021 - Sepsis Due to infected hardware/hematoma of R foot, now s/p removal and washout,  Atrial Fib  - S/p exfix removal, I&D, fasciotomy (DOS 2/17/21)  - s/p TMA of left foot 2/22  - s/p debridement and vac application per Plastics (3/1/21)  - s/p Percutaneous screw fixation right medial column, wound debridement and wound vac change (3/4/21)    Assessment/Plan: wound vac dressing changed today by podiatry  Continue NWB RLE  Eliquis/SCD's  Continue IV abx per ID: patient to continue doxycycline 100 mg p o  Q 12 hours  Antibiotic option with cefepime IV through PICC line, vs doxycycline p o  Discussed with patient and his wife present at bedside  Risk versus benefit of cefepime and outpatient IV antibiotic treatment compared to p o  Doxycycline for osteomyelitis discussed  Tx x6 weeks total through 04/15/2021  Will check CBC, CMP in 2 weeks, then ID follow-up in 3-4 weeks as outpatient  [facility] denied pt stating he is too functional for ARC    CM sent referrals to SNF facilities for acceptance of bed on d/c     Pertinent Labs/Diagnostic Results:     Results from last 7 days   Lab Units 03/08/21  0534 03/06/21  1139   WBC Thousand/uL 6 88 7 58   HEMOGLOBIN g/dL 11 5* 13 1   HEMATOCRIT % 36 9 43 5   PLATELETS Thousands/uL 376 425*   NEUTROS ABS Thousands/µL 3 99 5 17     Results from last 7 days   Lab Units 03/08/21  0534 03/06/21  1137   SODIUM mmol/L 142 138   POTASSIUM mmol/L 4 1 4 1   CHLORIDE mmol/L 112* 107   CO2 mmol/L 25 26   ANION GAP mmol/L 5 5   BUN mg/dL 12 18   CREATININE mg/dL 0 90 1 07   EGFR ml/min/1 73sq m 87 71   CALCIUM mg/dL 8 8 9 2     Results from last 7 days   Lab Units 03/08/21  1149 03/08/21  0804 03/07/21  2044 03/07/21  1619 03/07/21  1155 03/07/21  1153 03/07/21  0813 03/06/21 2008 03/06/21  1713 03/06/21  1132 03/06/21  0804 03/05/21 2056   POC GLUCOSE mg/dl 225* 161* 282* 247* 273* 301* 147* 258* 248* 200* 169* 257*     Results from last 7 days   Lab Units 03/08/21  0534 03/06/21  1137   GLUCOSE RANDOM mg/dL 164* 204*       Vital Signs:   Date/Time  Temp  Pulse  Resp  BP  MAP (mmHg)  SpO2   03/08/21 15:58:31  97 9 °F (36 6 °C)  --  18  126/91  103  98 %   03/08/21 11:51:11  98 7 °F (37 1 °C)  --  16  131/95  107  99 %   03/08/21 08:06:14  --  80  16  131/98  109  97 %   03/07/21 22:31:05  99 1 °F (37 3 °C)  --  16  124/74  91  --   03/07/21 21:50:27  --  --  --  122/74  90  --   03/07/21 2150  --  107Abnormal   --  122/74  --  --   03/07/21 18:34:51  99 1 °F (37 3 °C)  90  16  120/74  89  96 %   03/07/21 14:42:01  98 7 °F (37 1 °C)  119Abnormal   16  121/79  93  96 %   03/07/21 08:49:53  --  --  --  119/83  95  --   03/07/21 08:14:46  98 7 °F (37 1 °C)  86  18  110/82  91  --   03/06/21 23:08:55  98 4 °F (36 9 °C)  85  16  108/81  90  98 %   03/06/21 2039  --  82  --  --  --  --   03/06/21 20:27:15  --  --  --  147/79  102  --   03/06/21 19:16:59  98 7 °F (37 1 °C)  --  16  117/80  92  --   03/06/21 14:30:10  98 3 °F (36 8 °C)  92  16  130/72  91  97 %   03/06/21 08:09:07  97 4 °F (36 3 °C)Abnormal   87  18  145/83  104  98 %   03/06/21 02:45:55  98 5 °F (36 9 °C)  94  17  95/60   72  98 %     Medications:   Scheduled Medications:  apixaban, 5 mg, Oral, BID  atorvastatin, 40 mg, Oral, Daily  bisacodyl, 10 mg, Rectal, Once  cefepime, 2,000 mg, Intravenous, Q12H  insulin glargine, 15 Units, Subcutaneous, HS  insulin lispro, 1-6 Units, Subcutaneous, 4x Daily (AC & HS)  lisinopril, 40 mg, Oral, Daily  metoprolol tartrate, 100 mg, Oral, Q12H SUNG  senna-docusate sodium, 1 tablet, Oral, HS         PRN Meds:  acetaminophen, 650 mg, Oral, Q6H PRN  HYDROmorphone, 0 5 mg, Intravenous, Q4H PRN  LORazepam, 0 5 mg, Oral, Q8H PRN  metoprolol, 5 mg, Intravenous, Q6H PRN  oxyCODONE, 10 mg, Oral, Q4H PRN  oxyCODONE, 5 mg, Oral, Q4H PRN  polyethylene glycol, 17 g, Oral, Daily PRN        Discharge Plan: TBD    Network Utilization Review Department  ATTENTION: Please call with any questions or concerns to [phone] and carefully listen to the prompts so that you are directed to the right person  All voicemails are confidential   [place] all requests for admission clinical reviews, approved or denied determinations and any other requests to dedicated fax number below belonging to the campus where the patient is receiving treatment   List of dedicated fax numbers for the Facilities:  [address] DENIALS (Administrative/Medical Necessity) [phone]   [address] (Maternity/NICU/Pediatrics) [phone] [address] [place] [address] (Emilia Lunger) [address] [place] [address] [place] [address] [place] [address] [phone]

## 2021-03-08 NOTE — PROGRESS NOTES
Progress Note - Infectious Disease   Gema Magallon  78 y o  male MRN: [id number]  Unit/Bed#: [facility] 624-01 Encounter: [id number]      Impression/Plan:    1- Postoperative bleeding  complicated by hematoma formation,  compartment syndrome,  with superimposed infection:  Patient was taken to operation room 2/17/2021 where he underwent external fixator removal, hematoma evacuation, fasciotomy   Small amount of purulence noted   Wound cultures collected intraoperatively from lateral and medial aspect of the foot both growing Enterobacter cloaca   E faecalis  also growing in 1 operative culture specimen  Due to progressive ischemia, patient was taken again to OR 02/22/2021 where he underwent TMA   Operative report reviewed and case discussed with podiatrist Dr Scott Ramírez was removed   All infected tissue removed   Patient had been on IV Zosyn previously   With hardware removal, he was transition to amoxicillin/doxycycline  Patient underwent I&D with screw fixation of right foot on 03/04   Bone was hard and normal at surgery   However, bone scraping culture is now growing Enterobacter  patient currently on cefepime IV  - patient to continue doxycycline 100 mg p o  Q 12 hours  Antibiotic option with cefepime IV through PICC line, versus doxycycline p o  Discussed with patient and his wife present at bedside  Risk versus benefit of cefepime and outpatient IV antibiotic treatment compared to p o  Doxycycline for osteomyelitis discussed    -treat x6 weeks total through 04/15/2021  Will check CBC, CMP in 2 weeks, then ID follow-up in 3-4 weeks as outpatient    - okay for discharge from ID standpoint        2- diabetes mellitus type 2, A1c 7 8%   Risk factor for infectious complications as mentioned above  -  management as per primary service team        3- atrial fibrillation:  -rate control and anticoagulation as per primary service team     Discussed with the patient and his wife present at bedside in detail regarding the above plan  Discussed with Podiatry service and nursing staff       Antibiotics:  Cefepime day 2  POD # 4    Subjective:  Patient has no fever, chills, sweats; no nausea, vomiting, diarrhea; no cough, shortness of breath; no pain  No new symptoms  Objective:  Vitals:  Temp:  [98 7 °F (37 1 °C)-99 1 °F (37 3 °C)] 99 1 °F (37 3 °C)  HR:  [] 80  Resp:  [16] 16  BP: (120-131)/(74-98) 131/98  SpO2:  [96 %-97 %] 97 %  Temp (24hrs), Av °F (37 2 °C), Min:98 7 °F (37 1 °C), Max:99 1 °F (37 3 °C)  Current: Temperature: 99 1 °F (37 3 °C)    Physical Exam:   General Appearance:  Alert, interactive, nontoxic, no acute distress  Throat: Oropharynx moist without lesions  Lungs:   Clear to auscultation bilaterally; no wheezes, rhonchi or rales; respirations unlabored   Heart:  RRR; no murmur, rub or gallop   Abdomen:   Soft, non-tender, non-distended, positive bowel sounds  Extremities: No clubbing, cyanosis or edema  Right foot dressing with wound VAC in place, no signs of leakage  Skin: No new rashes or lesions          Labs, Imaging, & Other studies:   All pertinent labs and imaging studies were personally reviewed  Results from last 7 days   Lab Units 21  0534 21  1139   WBC Thousand/uL 6 88 7 58   HEMOGLOBIN g/dL 11 5* 13 1   PLATELETS Thousands/uL 376 425*     Results from last 7 days   Lab Units 21  0534 21  1137   SODIUM mmol/L 142 138   POTASSIUM mmol/L 4 1 4 1   CHLORIDE mmol/L 112* 107   CO2 mmol/L 25 26   BUN mg/dL 12 18   CREATININE mg/dL 0 90 1 07   EGFR ml/min/1 73sq m 87 71   CALCIUM mg/dL 8 8 9 2     Results from last 7 days   Lab Units 21  1944   GRAM STAIN RESULT  No Polys or Bacteria seen

## 2021-03-08 NOTE — PLAN OF CARE
Problem: OCCUPATIONAL THERAPY ADULT  Goal: Performs self-care activities at highest level of function for planned discharge setting  See evaluation for individualized goals  Description: Treatment Interventions: ADL retraining, Functional transfer training, UE strengthening/ROM, Patient/family training, Endurance training, Equipment evaluation/education, Compensatory technique education, Continued evaluation, Energy conservation, Activityengagement          See flowsheet documentation for full assessment, interventions and recommendations  Outcome: Progressing  Note: Limitation: Decreased ADL status, Decreased self-care trans, Decreased high-level ADLs, Decreased endurance, Decreased Safe judgement during ADL  Prognosis: Good  Assessment: PT SEEN FOR OT REEVAL 2' GOAL DATE EXPIRING WITH REASSESSMENT OF ABILITIES REQUIRED  PT REMAINS NWB ON RLE WITH WOUND VAC IN PLACE  SEE INITIAL OT EVAL FOR FURTHER DETAILS  PT CURRENTLY REQUIRES OVERALL SUPERVISION-MIN A WITH ADLS AND  SUPERVISION WITH TRANSFERS  PT AT OVERALL SUPERVISION FOR FUNCTIONAL MOBILITY WITH USE OF RW HOWEVER PERIODS OF MIN A REQUIRED WHEN TURNING AND TAKING STEPS BACKWARD DURING CHAIR APPROACH  G CARRY OVER OF NWB STATUS T/O SESSION  PT IS LIMITED 2' PAIN, FATIGUE, IMPAIRED BALANCE, FALL RISK , WB RESTRICTIONS, LIMITED FAMILY/FRIEND SUPPORT  and OVERALL LIMITED ACTIVITY TOLERANCE  PT EDUCATED ON CARRY OVER OF WB STATUS, DEEP BREATHING TECHNIQUES T/O ACTIVITY, SLOWING OF PACE, INCREASED FAMILY SUPPORT and CONTINUE PARTICIPATION IN SELF-CARE/MOBILITY WITH STAFF WHILE IN THE HOSPITAL   SPOUSE PRESENT AND EXPRESSED CONCERNS FOR PT BEING HOME ALONE AT TIMES, COMPLETING ADLS/IADLS, AND STAIR MANAGEMENT WITHIN COMMUNITY  IN ADDITION, SPOUSE REPORTS PLAN TO D/C WITH WOUND VAC- ANTICIPATE CONCERNS FOR PT SAFELY MANAGING WOUND VAC WHILE MAINTAINING WBS DURING DAILY ACTIVITIES    FROM AN OCCUPATIONAL THERAPY PERSPECTIVE, REC INPT REHAB VS HOME WITH INCREASED FAMILY SUPPORT PENDING PT PROGRESS/ AVAILABLE SUPPORT  GOAL DATE EXTENDED +14 DAYS  INITIAL OT GOALS REMAIN APPROPRIATE  OT Discharge Recommendation: (SEE ABOVE )  OT - OK to Discharge:  Yes

## 2021-03-08 NOTE — PLAN OF CARE
Problem: PHYSICAL THERAPY ADULT  Goal: Performs mobility at highest level of function for planned discharge setting  See evaluation for individualized goals  Description: Treatment/Interventions: Functional transfer training, LE strengthening/ROM, Elevations, Therapeutic exercise, Endurance training, Bed mobility, Gait training, OT  Equipment Recommended: Walker(at this time)       See flowsheet documentation for full assessment, interventions and recommendations  Outcome: Progressing  Note: Prognosis: Good  Problem List: Decreased strength, Decreased endurance, Impaired balance, Decreased mobility, Decreased safety awareness, Orthopedic restrictions  Assessment: Pt very motivated to improve and participate, making further functional gains, remains limited by fatigue and balance  Pt educated on RW use for safety with ambulation, discussed concerns over crutch use given wound vac line and balance impairments, verbalized understanding  Pt would be a good candidate for knee scooter trial   Pt's spouse arrived during session and discussed above recommendations  Pt maintains NWB appropriately throughout session, 2x LOB with rotational turns  Goals extended as they remain appropriate  Pt will benefit from continued skilled PT intervention during course of hospital stay to improve strength, endurance and balance to improve safety with functional mobility  Recommend IP rehab upon hospital D/C  Barriers to Discharge: Decreased caregiver support  Barriers to Discharge Comments: At conclusion of PT session pt returned BTB with phone and call bell within reach  Pt denies any further questions at this time  The patient's AM-PAC Basic Mobility Inpatient Short Form Raw Score is 14, Standardized Score is 35 55  A standardized score less than 42 9 suggests the patient may benefit from discharge to post-acute rehabilitation services   However, anticipate pt will progress quickly to allow for d/c home as he has good social support and an accessible home  Please also refer to the recommendation of the Physical Therapist for safe discharge planning  Recommending home with social support and HHPT upon d/c    PT Discharge Recommendation: Post-Acute Rehabilitation Services     PT - OK to Discharge: Yes(to IP rehab)    See flowsheet documentation for full assessment

## 2021-03-08 NOTE — CASE MANAGEMENT
CM spoke to Podiatry  Pt is cleared from their standpoint  CM spoke to Paul from [facility], who will contact their director to see if they can accept, and if so, CM will submit for insurance auth  CM discussed this with pt's wife  First choice is [facility]  She would like a referral to  [facility] in the event that [facility] can't accept   CM placed

## 2021-03-08 NOTE — OCCUPATIONAL THERAPY NOTE
Occupational Therapy REEvaluation     Patient Name: Kaylan Falcon    Today's Date: 3/8/2021  Problem List  Principal Problem:    Sepsis ([address] )  Active Problems:    Hyperlipidemia    Type 2 diabetes mellitus with diabetic neuropathy (HCC)    Atrial fibrillation with RVR ([address] )    Essential hypertension    Benign prostatic hyperplasia with lower urinary tract symptoms    Congenital talipes equinovarus, right foot    Post-operative state    Cellulitis of extremity    Abscess    Constipation    Past Medical History  Past Medical History:   Diagnosis Date    Arthritis     Atrial fibrillation ([address] )     Diagnosed 11/2018    Benign prostate hyperplasia 04/2002    Cellulitis     right lower leg     Colon polyp 2006    Diabetes mellitus ([address] )     Hearing aid worn     bilat    Hearing loss     90% loss left ear and 40% right ear    History of clubfoot     "since birth"    History of pneumonia     History of TIA (transient ischemic attack) 04/25/2017 11/30/18 pt denies    Hyperlipidemia 5/15/2014    Hypertension     Infectious viral hepatitis     "cant remember type"    Irregular heart beat     Neuropathy     both feet    Osteomyel[facility] )     right great toe    Right middle lobe pulmonary nodule 11/6/2018    Seasickness     Teeth missing     Type 2 diabetes mellitus with diabetic neuropathy ([facility] ) 11/6/2018    Wears glasses     reading    Wound, open     bottom of right foot     Past Surgical History  Past Surgical History:   Procedure Laterality Date    BUNIONECTOMY Right 12/4/2018    Procedure: 5TH METATARSAL BONE PARTIAL RESECTION, FULL THICKNESS DEBRIDEMENT OF DIABETIC ULCER;  Surgeon: Missy Galeano DPM;  [facility];  Service: Podiatry    CLUB FOOT RELEASE Bilateral     COLONOSCOPY      EXTERNAL FIXATOR APPLICATION Right 5/56/5104    Procedure: Application multiplane external fixation;  Surgeon: Alyson Kwan DPM;  [facility];  Service: Podiatry    FOOT HARDWARE REMOVAL Right 2/17/2021    Procedure: REMOVAL EXTERNAL FIXATOR;  Surgeon: Margoth Cervantes DPM;  [facility];  Service: Podiatry    INCISION AND DRAINAGE OF WOUND Right 2/17/2021    Procedure: INCISION AND DRAINAGE (I&D) EXTREMITY;  Surgeon: Margoth Cervantes DPM;  [facility];  Service: Podiatry    ORIF FOOT FRACTURE Right 3/4/2021    Procedure: VAC PLACEMENT; SCREW FIXATION RIGHT FOOT FUSION;  Surgeon: Margoth Cervantes DPM;  [facility];  Service: Podiatry    [address] Right 2/22/2021    Procedure: AMPUTATION TRANSMETATARSAL (TMA), REMOVAL NAIL IM T2 ICF HARDWARE;  Surgeon: Margoth Cervantes DPM;  [facility];  Service: Podiatry    NH FUSION FOOT BONES,MIDTARSAL,OSTEOTMY Right 2/12/2021    Procedure: foot ARTHRODESIS/FUSION;  Surgeon: Margoth Cervantes DPM;  [facility];  Service: Podiatry    NH LENGTH/SHORT LEG/ANKL TENDON,SINGLE Right 2/12/2021    Procedure: LENGTHEN TIBIAL TENDON, TRANS HEEL CORD;  Surgeon: Margoth Cervantes DPM;  [facility];  Service: Podiatry    NH PART [address] BONE METATARSAL HEAD,EA Right 12/29/2020    Procedure: EXCISION EXOSTOSIS;  Surgeon: Margoth Cervantes DPM;  [facility];  Service: [address] WND EXTEN/COMPLIC Right 90/09/5109    Procedure: PRIMARY DELAYED CLOSURE;  Surgeon: Margoth Cervantes DPM;  [facility];  Service: Podiatry    TOE AMPUTATION Right     partial great toe    VAC DRESSING APPLICATION Right 4/9/3243    Procedure: APPLICATION VAC DRESSING EXTREMITY;  Surgeon: Erroll Skiff, MD;  [facility];  Service: Plastics    [address] [facility] Right 3/1/2021    Procedure: DEBRIDEMENT LOWER EXTREMITY ([address] OUT);   Surgeon: Erroll Skiff, MD;  [facility];  Service: Plastics           03/08/21 1620   OT Last Visit   OT Visit Date 03/08/21   Note Type   Note type Re-Evaluation   Restrictions/Precautions   Weight Bearing Precautions Per Order Yes   RLE Weight Bearing Per Order NWB  (WOUND VAC)   Other Precautions WBS; Multiple lines; Fall Risk;Hard of hearing  (WOUND VAC )   Pain Assessment   Pain Assessment Tool 0-10   Pain Score No Pain   Home Living   Type of Home Mobile home   Home Layout One level;Ramped entrance   Bathroom Shower/Tub Tub/shower unit   [address]; Wheelchair-manual;Crutches  (KNEE SCOOTER)   Additional Comments SPOUSE EXPRESSED DESIRE TO TRIAL KNEE SCOOTER IN FUTURE SESSIONS    Prior Function   Level of [place] Independent with ADLs and functional mobility   Lives With Spouse   Receives Help From Family   ADL Assistance Independent   IADLs Independent   Falls in the last 6 months 0   Vocational Full time employment   Lifestyle   Autonomy Pt reports that at baseline he is I with ADLS, IADLS and has been using crutches for mobility PTA  (+)     Reciprocal Relationships SUPPORTIVE SPOUSE [place] DURING [address] PT WILL BE ALONE DURING THE DAY    Service 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   Intrinsic Gratification Enjoys golfing    ADL   Eating Assistance 7  Independent   Grooming Assistance 7  Independent   [address] 5  Supervision/Setup   LB Bathing Assistance 4  Minimal Assistance   [address] 5  Supervision/Setup   LB Dressing Assistance 4  [address] 5  Supervision/Setup   Transfers   Sit to [address]   Additional items Assist x 1; Increased time required   Stand to Sit 5  Supervision   Additional items Assist x 1; Increased time required   Functional Mobility   Functional Mobility 4  Minimal assistance   Additional Comments OVERALL SUPERVISION FOR FUNCTIONAL MOBILITY WITH USE OF RW HOWEVER PERIODS OF MIN A REQUIRED WHEN TURNING AND TAKING STEPS BACKWARD DURING CHAIR APPROACH  G CARRY OVER OF NWB STATUS T/O SESSION  Additional items Rolling walker   Balance   Static Sitting Good   Static Standing Fair -   Ambulatory Fair -   Activity Tolerance   Activity Tolerance Patient tolerated treatment well   Medical Staff Made Aware CM   RUE Assessment   RUE Assessment WFL   LUE Assessment   LUE Assessment WFL   Hand Function   Gross Motor Coordination Functional   Fine Motor Coordination Functional   Cognition   Overall Cognitive Status WFL   Arousal/Participation Alert; Cooperative   Attention Within functional limits   Orientation Level Oriented X4   Memory Within functional limits   Following Commands Follows all commands and directions without difficulty   Comments PT IS PLEASANT AND COOPERATIVE  G RECALL/CARRY OVER OF LEARNED PRECAUTIONS  + [place]   Assessment   Limitation Decreased ADL status; Decreased self-care trans;Decreased high-level ADLs; Decreased endurance;Decreased Safe judgement during ADL   Prognosis Good   Assessment PT SEEN FOR OT REEVAL 2' GOAL DATE EXPIRING WITH REASSESSMENT OF ABILITIES REQUIRED  PT REMAINS NWB ON RLE WITH WOUND VAC IN PLACE  SEE INITIAL OT EVAL FOR FURTHER DETAILS  PT CURRENTLY REQUIRES OVERALL SUPERVISION-MIN A WITH ADLS AND  SUPERVISION WITH TRANSFERS  PT AT OVERALL SUPERVISION FOR FUNCTIONAL MOBILITY WITH USE OF RW HOWEVER PERIODS OF MIN A REQUIRED WHEN TURNING AND TAKING STEPS BACKWARD DURING CHAIR APPROACH  G CARRY OVER OF NWB STATUS T/O SESSION  PT IS LIMITED 2' PAIN, FATIGUE, IMPAIRED BALANCE, FALL RISK , WB RESTRICTIONS, LIMITED FAMILY/FRIEND SUPPORT  and OVERALL LIMITED ACTIVITY TOLERANCE  PT EDUCATED ON CARRY OVER OF WB STATUS, DEEP BREATHING TECHNIQUES T/O ACTIVITY, SLOWING OF PACE, INCREASED FAMILY SUPPORT and CONTINUE PARTICIPATION IN SELF-CARE/MOBILITY WITH STAFF WHILE IN THE HOSPITAL   SPOUSE PRESENT AND EXPRESSED CONCERNS FOR PT BEING HOME ALONE AT TIMES, COMPLETING ADLS/IADLS, AND STAIR MANAGEMENT WITHIN COMMUNITY   IN ADDITION, SPOUSE REPORTS PLAN TO D/C WITH WOUND VAC- ANTICIPATE CONCERNS FOR PT SAFELY MANAGING WOUND VAC WHILE MAINTAINING WBS DURING DAILY ACTIVITIES  FROM AN OCCUPATIONAL THERAPY PERSPECTIVE, REC INPT REHAB VS HOME WITH INCREASED FAMILY SUPPORT PENDING PT PROGRESS/ AVAILABLE SUPPORT  GOAL DATE EXTENDED +14 DAYS  INITIAL OT GOALS REMAIN APPROPRIATE  Goals   Patient Goals TO "FEEL MORE COMFORTABLE WITH THE WALKER"    LTG Time Frame 10-14   Long Term Goal #1 SEE INITIAL OT EVAL FOR GOALS  Plan   Treatment Interventions ADL retraining;Functional transfer training;Patient/family training;Equipment evaluation/education; Compensatory technique education; Activityengagement; Energy conservation   Goal Expiration Date 03/22/21   OT Treatment Day 5   OT Frequency 3-5x/wk   Recommendation   OT Discharge Recommendation   (SEE ABOVE )   Equipment Recommended Bedside commode  (IF RETURNING DIRECTLY HOME )   Commode Type Standard   OT - OK to Discharge Yes   AM-PAC Daily Activity Inpatient   Lower Body Dressing 3   Bathing 3   Toileting 3   Upper Body Dressing 4   Grooming 4   Eating 4   Daily Activity Raw Score 21   Daily Activity Standardized Score (Calc for Raw Score >=11) 44 27   AM-PAC Applied Cognition Inpatient   Following a Speech/Presentation 3   Understanding Ordinary Conversation 4   Taking Medications 4   Remembering Where Things Are Placed or Put Away 3   Remembering List of 4-5 Errands 3   Taking Care of Complicated Tasks 3   Applied Cognition Raw Score 20   Applied Cognition Standardized Score 41 76   Modified [place]an Scale   Modified [place] Scale 3     SEE INITIAL OT EVAL FOR GOALS       Documentation completed by MIRYAM Mckeon, OTR/L

## 2021-03-08 NOTE — PLAN OF CARE
Problem: Potential for Falls  Goal: Patient will remain free of falls  Description: INTERVENTIONS:  - Assess patient frequently for physical needs  -  Identify cognitive and physical deficits and behaviors that affect risk of falls    -  [place] fall precautions as indicated by assessment   - Educate patient/family on patient safety including physical limitations  - Instruct patient to call for assistance with activity based on assessment  - Modify environment to reduce risk of injury  - Consider OT/PT consult to assist with strengthening/mobility  Outcome: Progressing     Problem: Prexisting or High Potential for Compromised Skin Integrity  Goal: Skin integrity is maintained or improved  Description: INTERVENTIONS:  - Identify patients at risk for skin breakdown  - Assess and monitor skin integrity  - Assess and monitor nutrition and hydration status  - Monitor labs   - Assess for incontinence   - Turn and reposition patient  - Assist with mobility/ambulation  - Relieve pressure over bony prominences  - Avoid friction and shearing  - Provide appropriate hygiene as needed including keeping skin clean and dry  - Evaluate need for skin moisturizer/barrier cream  - Collaborate with interdisciplinary team   - Patient/family teaching  - Consider wound care consult   Outcome: Progressing     Problem: PAIN - ADULT  Goal: Verbalizes/displays adequate comfort level or baseline comfort level  Description: Interventions:  - Encourage patient to monitor pain and request assistance  - Assess pain using appropriate pain scale  - Administer analgesics based on type and severity of pain and evaluate response  - Implement non-pharmacological measures as appropriate and evaluate response  - Consider cultural and social influences on pain and pain management  - Notify physician/advanced practitioner if interventions unsuccessful or patient reports new pain  Outcome: Progressing     Problem: INFECTION - ADULT  Goal: Absence or prevention of progression during hospitalization  Description: INTERVENTIONS:  - Assess and monitor for signs and symptoms of infection  - Monitor lab/diagnostic results  - Monitor all insertion sites, i e  indwelling lines, tubes, and drains  - Monitor endotracheal if appropriate and nasal secretions for changes in amount and color  - [place] appropriate cooling/warming therapies per order  - Administer medications as ordered  - Instruct and encourage patient and family to use good hand hygiene technique  - Identify and instruct in appropriate isolation precautions for identified infection/condition  Outcome: Progressing     Problem: SAFETY ADULT  Goal: Patient will remain free of falls  Description: INTERVENTIONS:  - Assess patient frequently for physical needs  -  Identify cognitive and physical deficits and behaviors that affect risk of falls    -  [place] fall precautions as indicated by assessment   - Educate patient/family on patient safety including physical limitations  - Instruct patient to call for assistance with activity based on assessment  - Modify environment to reduce risk of injury  - Consider OT/PT consult to assist with strengthening/mobility  Outcome: Progressing  Goal: Maintain or return to baseline ADL function  Description: INTERVENTIONS:  -  Assess patient's ability to carry out ADLs; assess patient's baseline for ADL function and identify physical deficits which impact ability to perform ADLs (bathing, care of mouth/teeth, toileting, grooming, dressing, etc )  - Assess/evaluate cause of self-care deficits   - Assess range of motion  - Assess patient's mobility; develop plan if impaired  - Assess patient's need for assistive devices and provide as appropriate  - Encourage maximum independence but intervene and supervise when necessary  - Involve family in performance of ADLs  - Assess for home care needs following discharge   - Consider OT consult to assist with ADL evaluation and planning for discharge  - Provide patient education as appropriate  Outcome: Progressing  Goal: Maintain or return mobility status to optimal level  Description: INTERVENTIONS:  - Assess patient's baseline mobility status (ambulation, transfers, stairs, etc )    - Identify cognitive and physical deficits and behaviors that affect mobility  - Identify mobility aids required to assist with transfers and/or ambulation (gait belt, sit-to-stand, lift, walker, cane, etc )  - [place] fall precautions as indicated by assessment  - Record patient progress and toleration of activity level on Mobility SBAR; progress patient to next Phase/Stage  - Instruct patient to call for assistance with activity based on assessment  - Consider rehabilitation consult to assist with strengthening/weightbearing, etc   Outcome: Progressing     Problem: DISCHARGE PLANNING  Goal: Discharge to home or other facility with appropriate resources  Description: INTERVENTIONS:  - Identify barriers to discharge w/patient and caregiver  - Arrange for needed discharge resources and transportation as appropriate  - Identify discharge learning needs (meds, wound care, etc )  - Arrange for interpretive services to assist at discharge as needed  - Refer to Case Management Department for coordinating discharge planning if the patient needs post-hospital services based on physician/advanced practitioner order or complex needs related to functional status, cognitive ability, or social support system  Outcome: Progressing     Problem: METABOLIC, FLUID AND ELECTROLYTES - ADULT  Goal: Glucose maintained within target range  Description: INTERVENTIONS:  - Monitor Blood Glucose as ordered  - Assess for signs and symptoms of hyperglycemia and hypoglycemia  - Administer ordered medications to maintain glucose within target range  - Assess nutritional intake and initiate nutrition service referral as needed  Outcome: Progressing     Problem: MUSCULOSKELETAL - ADULT  Goal: Maintain or return mobility to safest level of function  Description: INTERVENTIONS:  - Assess patient's ability to carry out ADLs; assess patient's baseline for ADL function and identify physical deficits which impact ability to perform ADLs (bathing, care of mouth/teeth, toileting, grooming, dressing, etc )  - Assess/evaluate cause of self-care deficits   - Assess range of motion  - Assess patient's mobility  - Assess patient's need for assistive devices and provide as appropriate  - Encourage maximum independence but intervene and supervise when necessary  - Involve family in performance of ADLs  - Assess for home care needs following discharge   - Consider OT consult to assist with ADL evaluation and planning for discharge  - Provide patient education as appropriate  Outcome: Progressing  Goal: Maintain proper alignment of affected body part  Description: INTERVENTIONS:  - Support, maintain and protect limb and body alignment  - Provide patient/ family with appropriate education  Outcome: Progressing

## 2021-03-09 DIAGNOSIS — L02.91 ABSCESS: Primary | ICD-10-CM

## 2021-03-09 LAB
FLUAV RNA RESP QL NAA+PROBE: NEGATIVE
FLUBV RNA RESP QL NAA+PROBE: NEGATIVE
GLUCOSE SERPL-MCNC: 176 MG/DL (ref 65–140)
GLUCOSE SERPL-MCNC: 227 MG/DL (ref 65–140)
GLUCOSE SERPL-MCNC: 293 MG/DL (ref 65–140)
GLUCOSE SERPL-MCNC: 300 MG/DL (ref 65–140)
RSV RNA RESP QL NAA+PROBE: NEGATIVE
SARS-COV-2 RNA RESP QL NAA+PROBE: NEGATIVE

## 2021-03-09 PROCEDURE — 82948 REAGENT STRIP/BLOOD GLUCOSE: CPT

## 2021-03-09 PROCEDURE — 99232 SBSQ HOSP IP/OBS MODERATE 35: CPT | Performed by: INTERNAL MEDICINE

## 2021-03-09 PROCEDURE — 0241U HB NFCT DS VIR RESP RNA 4 TRGT: CPT | Performed by: STUDENT IN AN ORGANIZED HEALTH CARE EDUCATION/TRAINING PROGRAM

## 2021-03-09 PROCEDURE — NC001 PR NO CHARGE: Performed by: PODIATRIST

## 2021-03-09 RX ORDER — DOXYCYCLINE HYCLATE 100 MG/1
100 CAPSULE ORAL EVERY 12 HOURS SCHEDULED
Status: DISCONTINUED | OUTPATIENT
Start: 2021-03-09 | End: 2021-03-10 | Stop reason: HOSPADM

## 2021-03-09 RX ADMIN — CEFEPIME HYDROCHLORIDE 2000 MG: 2 INJECTION, POWDER, FOR SOLUTION INTRAVENOUS at 01:40

## 2021-03-09 RX ADMIN — LISINOPRIL 40 MG: 20 TABLET ORAL at 08:47

## 2021-03-09 RX ADMIN — APIXABAN 5 MG: 5 TABLET, FILM COATED ORAL at 18:19

## 2021-03-09 RX ADMIN — INSULIN GLARGINE 15 UNITS: 100 INJECTION, SOLUTION SUBCUTANEOUS at 21:45

## 2021-03-09 RX ADMIN — METOPROLOL TARTRATE 100 MG: 50 TABLET, FILM COATED ORAL at 08:47

## 2021-03-09 RX ADMIN — METOPROLOL TARTRATE 100 MG: 50 TABLET, FILM COATED ORAL at 21:47

## 2021-03-09 RX ADMIN — ATORVASTATIN CALCIUM 40 MG: 40 TABLET, FILM COATED ORAL at 08:47

## 2021-03-09 RX ADMIN — APIXABAN 5 MG: 5 TABLET, FILM COATED ORAL at 08:47

## 2021-03-09 RX ADMIN — DOXYCYCLINE 100 MG: 100 CAPSULE ORAL at 13:53

## 2021-03-09 RX ADMIN — DOXYCYCLINE 100 MG: 100 CAPSULE ORAL at 21:46

## 2021-03-09 NOTE — PROGRESS NOTES
Progress Note - Infectious Disease   Yury Delacruz  78 y o  male MRN: [id number]  Unit/Bed#: [facility] 624-01 Encounter: [id number]      Impression/Plan:    1- Postoperative bleeding  complicated by hematoma formation,  compartment syndrome,  with superimposed infection:  Patient was taken to operation room 2/17/2021 where he underwent external fixator removal, hematoma evacuation, fasciotomy   Small amount of purulence noted   Wound cultures collected intraoperatively from lateral and medial aspect of the foot both growing Enterobacter cloaca   E faecalis  also growing in 1 operative culture specimen  Due to progressive ischemia, patient was taken again to OR 02/22/2021 where he underwent TMA   Operative report reviewed and case discussed with podiatrist Dr Kaylee Soliman was removed   All infected tissue removed   Patient had been on IV Zosyn previously   With hardware removal, he was transition to amoxicillin/doxycycline  Patient underwent I&D with screw fixation of right foot on 03/04   Bone was hard and normal at surgery   However, bone scraping culture is now growing Enterobacter    patient currently on cefepime IV  - stop cefepime IV, start doxycycline p o    patient to continue doxycycline 100 mg p o  Q 12 hours through 04/15/2021 in setting of positive bone culture  Antibiotic option with cefepime IV through PICC line, versus doxycycline p o  Discussed in details yesterday with patient and his wife present at bedside  Risk versus benefit of cefepime and outpatient IV antibiotic treatment compared to p o  Doxycycline for osteomyelitis discussed  Discussion regarding measures to avoid doxycycline induced photosensitivity or pill esophagitis discussed with patient  -  Will check CBC, CMP in 2 weeks, then ID follow-up in 3-4 weeks as outpatient    Appointment entered in [facility] for discharge from ID standpoint        2- diabetes mellitus type 2, A1c 7 8%   Risk factor for infectious complications as mentioned above  -  management as per primary service team        3- atrial fibrillation:  -rate control and anticoagulation as per primary service team     Discussed with the patient and his wife present at bedside in detail regarding the above plan  Discussed with Podiatry service and nursing staff       Antibiotics:  Cefepime day 3  POD # 5       Subjective:  Patient has no fever, chills, sweats; no nausea, vomiting, diarrhea; no cough, shortness of breath; no pain  No new symptoms  Objective:  Vitals:  Temp:  [97 9 °F (36 6 °C)-98 7 °F (37 1 °C)] 98 5 °F (36 9 °C)  HR:  [] 82  Resp:  [16-19] 19  BP: (126-134)/(77-95) 126/80  SpO2:  [98 %-99 %] 98 %  Temp (24hrs), Av 3 °F (36 8 °C), Min:97 9 °F (36 6 °C), Max:98 7 °F (37 1 °C)  Current: Temperature: 98 5 °F (36 9 °C)    Physical Exam:   General Appearance:  Alert, interactive, nontoxic, no acute distress  Throat: Oropharynx moist without lesions  Lungs:   Clear to auscultation bilaterally; no wheezes, rhonchi or rales; respirations unlabored   Heart:  RRR; no murmur, rub or gallop   Abdomen:   Soft, non-tender, non-distended, positive bowel sounds  Extremities: No clubbing, cyanosis or edema   Skin: No new rashes or lesions  No draining wounds noted         Labs, Imaging, & Other studies:   All pertinent labs and imaging studies were personally reviewed  Results from last 7 days   Lab Units 21  0534 21  1139   WBC Thousand/uL 6 88 7 58   HEMOGLOBIN g/dL 11 5* 13 1   PLATELETS Thousands/uL 376 425*     Results from last 7 days   Lab Units 21  0534 21  1137   SODIUM mmol/L 142 138   POTASSIUM mmol/L 4 1 4 1   CHLORIDE mmol/L 112* 107   CO2 mmol/L 25 26   BUN mg/dL 12 18   CREATININE mg/dL 0 90 1 07   EGFR ml/min/1 73sq m 87 71   CALCIUM mg/dL 8 8 9 2     Results from last 7 days   Lab Units 21  1944   GRAM STAIN RESULT  No Polys or Bacteria seen

## 2021-03-09 NOTE — CASE MANAGEMENT
Pt accepted to [facility] can d/c there today but will need auth and a covid swab  CM submitted for auth and will follow up

## 2021-03-09 NOTE — PROGRESS NOTES
Podiatry - Progress Note  Patient: Javy Ziegler  78 y o  male   MRN: [id number]  PCP: Sade Amos MD  Unit/Bed#: [facility] 644-69 Encounter: [id number]  Date Of Visit: 21    ASSESSMENT:    Javy Ziegler  is a 78 y o  male with:    1  Right foot hematoma, surgical site infection, ischemia  - S/p exfix removal, I&D, fasciotomy (DOS 21)  - s/p TMA of left foot   - s/p debridement and vac application per Plastics (3/1/21)  - s/p Percutaneous screw fixation right medial column, wound debridement and wound vac change (3/4/21)  2  Congenital talipes equinovarus right foot             - s/p midfoot arthrodesis, exfix application (DOS )  3  T2Dm, last A1C 7 8 %  4  HTN  5  Altered mental status  6  Atrial fibrillation  7  Sepsis on admission - resolved      PLAN:    · Wound vac to be changed again 3/10  Wound vac is working properly 125 continuous  · Vss, afebrile, no leukocytosis  · Appreciate ID recommendation  · Elevation on green foam wedges or pillows when non-ambulatory  · Appreciate consulting services for recommendations and management  Antibiotics started: cefepime IV  Pharmacologic VTE Prophylaxis: Eliquis  Mechanical VTE Prophylaxis: sequential compression device   Weightbearing status: NWB RLE    Disposition:  Pending placement  SUBJECTIVE:     The patient was seen, evaluated, and assessed at bedside today  The patient was awake, alert, and in no acute distress  No acute events overnight  The patient denies pain to lower extremity  Patient denies N/V/F/chills/SOB/CP  OBJECTIVE:     Vitals:   /80   Pulse 82   Temp 98 5 °F (36 9 °C)   Resp 19   Ht 6' 4" (1 93 m)   Wt 109 kg (240 lb 15 4 oz)   SpO2 98%   BMI 29 33 kg/m²     Temp (24hrs), Av 3 °F (36 8 °C), Min:97 9 °F (36 6 °C), Max:98 7 °F (37 1 °C)      Physical Exam:     General: Alert, cooperative and no distress  Lungs: Non labored breathing  Abdomen: Soft, non-tender    Lower extremity exam:  Cardiovascular status at baseline  Neurological status at baseline  Musculoskeletal status at baseline  No calf tenderness noted  Wound vac is working properly, 125 continuous  Additional Data:     Labs:    Results from last 7 days   Lab Units 03/08/21  0534   WBC Thousand/uL 6 88   HEMOGLOBIN g/dL 11 5*   HEMATOCRIT % 36 9   PLATELETS Thousands/uL 376   NEUTROS PCT % 59   LYMPHS PCT % 31   MONOS PCT % 7   EOS PCT % 2     Results from last 7 days   Lab Units 03/08/21  0534   POTASSIUM mmol/L 4 1   CHLORIDE mmol/L 112*   CO2 mmol/L 25   BUN mg/dL 12   CREATININE mg/dL 0 90   CALCIUM mg/dL 8 8           * I Have Reviewed All Lab Data Listed Above  Recent Cultures (last 7 days):     Results from last 7 days   Lab Units 03/04/21 1944   GRAM STAIN RESULT  No Polys or Bacteria seen     Results from last 7 days   Lab Units 03/04/21 1944   ANAEROBIC CULTURE  No anaerobes isolated       Imaging: I have personally reviewed pertinent films in PACS  EKG, Pathology, and Other Studies: I have personally reviewed pertinent reports  ** Please Note: Portions of the record may have been created with voice recognition software  Occasional wrong word or "sound a like" substitutions may have occurred due to the inherent limitations of voice recognition software  Read the chart carefully and recognize, using context, where substitutions have occurred   **

## 2021-03-09 NOTE — PLAN OF CARE
Problem: Potential for Falls  Goal: Patient will remain free of falls  Description: INTERVENTIONS:  - Assess patient frequently for physical needs  -  Identify cognitive and physical deficits and behaviors that affect risk of falls    -  [place] fall precautions as indicated by assessment   - Educate patient/family on patient safety including physical limitations  - Instruct patient to call for assistance with activity based on assessment  - Modify environment to reduce risk of injury  - Consider OT/PT consult to assist with strengthening/mobility  Outcome: Progressing     Problem: Prexisting or High Potential for Compromised Skin Integrity  Goal: Skin integrity is maintained or improved  Description: INTERVENTIONS:  - Identify patients at risk for skin breakdown  - Assess and monitor skin integrity  - Assess and monitor nutrition and hydration status  - Monitor labs   - Assess for incontinence   - Turn and reposition patient  - Assist with mobility/ambulation  - Relieve pressure over bony prominences  - Avoid friction and shearing  - Provide appropriate hygiene as needed including keeping skin clean and dry  - Evaluate need for skin moisturizer/barrier cream  - Collaborate with interdisciplinary team   - Patient/family teaching  - Consider wound care consult   Outcome: Progressing     Problem: PAIN - ADULT  Goal: Verbalizes/displays adequate comfort level or baseline comfort level  Description: Interventions:  - Encourage patient to monitor pain and request assistance  - Assess pain using appropriate pain scale  - Administer analgesics based on type and severity of pain and evaluate response  - Implement non-pharmacological measures as appropriate and evaluate response  - Consider cultural and social influences on pain and pain management  - Notify physician/advanced practitioner if interventions unsuccessful or patient reports new pain  Outcome: Progressing     Problem: INFECTION - ADULT  Goal: Absence or prevention of progression during hospitalization  Description: INTERVENTIONS:  - Assess and monitor for signs and symptoms of infection  - Monitor lab/diagnostic results  - Monitor all insertion sites, i e  indwelling lines, tubes, and drains  - Monitor endotracheal if appropriate and nasal secretions for changes in amount and color  - [place] appropriate cooling/warming therapies per order  - Administer medications as ordered  - Instruct and encourage patient and family to use good hand hygiene technique  - Identify and instruct in appropriate isolation precautions for identified infection/condition  Outcome: Progressing     Problem: SAFETY ADULT  Goal: Patient will remain free of falls  Description: INTERVENTIONS:  - Assess patient frequently for physical needs  -  Identify cognitive and physical deficits and behaviors that affect risk of falls    -  [place] fall precautions as indicated by assessment   - Educate patient/family on patient safety including physical limitations  - Instruct patient to call for assistance with activity based on assessment  - Modify environment to reduce risk of injury  - Consider OT/PT consult to assist with strengthening/mobility  Outcome: Progressing  Goal: Maintain or return to baseline ADL function  Description: INTERVENTIONS:  -  Assess patient's ability to carry out ADLs; assess patient's baseline for ADL function and identify physical deficits which impact ability to perform ADLs (bathing, care of mouth/teeth, toileting, grooming, dressing, etc )  - Assess/evaluate cause of self-care deficits   - Assess range of motion  - Assess patient's mobility; develop plan if impaired  - Assess patient's need for assistive devices and provide as appropriate  - Encourage maximum independence but intervene and supervise when necessary  - Involve family in performance of ADLs  - Assess for home care needs following discharge   - Consider OT consult to assist with ADL evaluation and planning for discharge  - Provide patient education as appropriate  Outcome: Progressing  Goal: Maintain or return mobility status to optimal level  Description: INTERVENTIONS:  - Assess patient's baseline mobility status (ambulation, transfers, stairs, etc )    - Identify cognitive and physical deficits and behaviors that affect mobility  - Identify mobility aids required to assist with transfers and/or ambulation (gait belt, sit-to-stand, lift, walker, cane, etc )  - [place] fall precautions as indicated by assessment  - Record patient progress and toleration of activity level on Mobility SBAR; progress patient to next Phase/Stage  - Instruct patient to call for assistance with activity based on assessment  - Consider rehabilitation consult to assist with strengthening/weightbearing, etc   Outcome: Progressing     Problem: DISCHARGE PLANNING  Goal: Discharge to home or other facility with appropriate resources  Description: INTERVENTIONS:  - Identify barriers to discharge w/patient and caregiver  - Arrange for needed discharge resources and transportation as appropriate  - Identify discharge learning needs (meds, wound care, etc )  - Arrange for interpretive services to assist at discharge as needed  - Refer to Case Management Department for coordinating discharge planning if the patient needs post-hospital services based on physician/advanced practitioner order or complex needs related to functional status, cognitive ability, or social support system  Outcome: Progressing     Problem: METABOLIC, FLUID AND ELECTROLYTES - ADULT  Goal: Glucose maintained within target range  Description: INTERVENTIONS:  - Monitor Blood Glucose as ordered  - Assess for signs and symptoms of hyperglycemia and hypoglycemia  - Administer ordered medications to maintain glucose within target range  - Assess nutritional intake and initiate nutrition service referral as needed  Outcome: Progressing     Problem: MUSCULOSKELETAL - ADULT  Goal: Maintain or return mobility to safest level of function  Description: INTERVENTIONS:  - Assess patient's ability to carry out ADLs; assess patient's baseline for ADL function and identify physical deficits which impact ability to perform ADLs (bathing, care of mouth/teeth, toileting, grooming, dressing, etc )  - Assess/evaluate cause of self-care deficits   - Assess range of motion  - Assess patient's mobility  - Assess patient's need for assistive devices and provide as appropriate  - Encourage maximum independence but intervene and supervise when necessary  - Involve family in performance of ADLs  - Assess for home care needs following discharge   - Consider OT consult to assist with ADL evaluation and planning for discharge  - Provide patient education as appropriate  Outcome: Progressing  Goal: Maintain proper alignment of affected body part  Description: INTERVENTIONS:  - Support, maintain and protect limb and body alignment  - Provide patient/ family with appropriate education  Outcome: Progressing

## 2021-03-10 ENCOUNTER — NURSING HOME VISIT (OUTPATIENT)
Dept: GERIATRICS | Facility: OTHER | Age: 71
End: 2021-03-10
Payer: COMMERCIAL

## 2021-03-10 VITALS
HEART RATE: 88 BPM | DIASTOLIC BLOOD PRESSURE: 86 MMHG | WEIGHT: 240.96 LBS | SYSTOLIC BLOOD PRESSURE: 133 MMHG | RESPIRATION RATE: 18 BRPM | TEMPERATURE: 97.7 F | OXYGEN SATURATION: 100 % | BODY MASS INDEX: 29.34 KG/M2 | HEIGHT: 76 IN

## 2021-03-10 VITALS
BODY MASS INDEX: 25.98 KG/M2 | HEART RATE: 73 BPM | SYSTOLIC BLOOD PRESSURE: 129 MMHG | RESPIRATION RATE: 18 BRPM | WEIGHT: 213.4 LBS | TEMPERATURE: 97.6 F | OXYGEN SATURATION: 99 % | DIASTOLIC BLOOD PRESSURE: 69 MMHG

## 2021-03-10 DIAGNOSIS — Z47.81 AFTERCARE FOR AMPUTATION STUMP: Primary | ICD-10-CM

## 2021-03-10 DIAGNOSIS — I48.91 ATRIAL FIBRILLATION WITH RVR (HCC): ICD-10-CM

## 2021-03-10 DIAGNOSIS — E11.40 TYPE 2 DIABETES MELLITUS WITH DIABETIC NEUROPATHY, WITHOUT LONG-TERM CURRENT USE OF INSULIN (HCC): Chronic | ICD-10-CM

## 2021-03-10 DIAGNOSIS — R26.2 AMBULATORY DYSFUNCTION: ICD-10-CM

## 2021-03-10 DIAGNOSIS — Z23 ENCOUNTER FOR IMMUNIZATION: ICD-10-CM

## 2021-03-10 DIAGNOSIS — I10 ESSENTIAL HYPERTENSION: Chronic | ICD-10-CM

## 2021-03-10 PROBLEM — Q66.01 CONGENITAL TALIPES EQUINOVARUS, RIGHT FOOT: Status: RESOLVED | Noted: 2020-12-04 | Resolved: 2021-03-10

## 2021-03-10 PROBLEM — L03.119 CELLULITIS OF EXTREMITY: Status: RESOLVED | Noted: 2021-02-16 | Resolved: 2021-03-10

## 2021-03-10 PROBLEM — K59.00 CONSTIPATION: Status: RESOLVED | Noted: 2021-02-25 | Resolved: 2021-03-10

## 2021-03-10 PROBLEM — L02.91 ABSCESS: Status: RESOLVED | Noted: 2021-02-16 | Resolved: 2021-03-10

## 2021-03-10 PROBLEM — A41.9 SEPSIS (HCC): Status: RESOLVED | Noted: 2021-02-16 | Resolved: 2021-03-10

## 2021-03-10 LAB
ANION GAP SERPL CALCULATED.3IONS-SCNC: 5 MMOL/L (ref 4–13)
BASOPHILS # BLD AUTO: 0.06 THOUSANDS/ΜL (ref 0–0.1)
BASOPHILS NFR BLD AUTO: 1 % (ref 0–1)
BUN SERPL-MCNC: 17 MG/DL (ref 5–25)
CALCIUM SERPL-MCNC: 8.9 MG/DL (ref 8.3–10.1)
CHLORIDE SERPL-SCNC: 110 MMOL/L (ref 100–108)
CO2 SERPL-SCNC: 28 MMOL/L (ref 21–32)
CREAT SERPL-MCNC: 0.85 MG/DL (ref 0.6–1.3)
EOSINOPHIL # BLD AUTO: 0.2 THOUSAND/ΜL (ref 0–0.61)
EOSINOPHIL NFR BLD AUTO: 3 % (ref 0–6)
ERYTHROCYTE [DISTWIDTH] IN BLOOD BY AUTOMATED COUNT: 14.4 % (ref 11.6–15.1)
GFR SERPL CREATININE-BSD FRML MDRD: 90 ML/MIN/1.73SQ M
GLUCOSE SERPL-MCNC: 137 MG/DL (ref 65–140)
GLUCOSE SERPL-MCNC: 173 MG/DL (ref 65–140)
GLUCOSE SERPL-MCNC: 240 MG/DL (ref 65–140)
HCT VFR BLD AUTO: 37.6 % (ref 36.5–49.3)
HGB BLD-MCNC: 11.7 G/DL (ref 12–17)
IMM GRANULOCYTES # BLD AUTO: 0.02 THOUSAND/UL (ref 0–0.2)
IMM GRANULOCYTES NFR BLD AUTO: 0 % (ref 0–2)
LYMPHOCYTES # BLD AUTO: 2.2 THOUSANDS/ΜL (ref 0.6–4.47)
LYMPHOCYTES NFR BLD AUTO: 33 % (ref 14–44)
MCH RBC QN AUTO: 29.7 PG (ref 26.8–34.3)
MCHC RBC AUTO-ENTMCNC: 31.1 G/DL (ref 31.4–37.4)
MCV RBC AUTO: 95 FL (ref 82–98)
MONOCYTES # BLD AUTO: 0.54 THOUSAND/ΜL (ref 0.17–1.22)
MONOCYTES NFR BLD AUTO: 8 % (ref 4–12)
NEUTROPHILS # BLD AUTO: 3.67 THOUSANDS/ΜL (ref 1.85–7.62)
NEUTS SEG NFR BLD AUTO: 55 % (ref 43–75)
NRBC BLD AUTO-RTO: 0 /100 WBCS
PLATELET # BLD AUTO: 339 THOUSANDS/UL (ref 149–390)
PMV BLD AUTO: 10.7 FL (ref 8.9–12.7)
POTASSIUM SERPL-SCNC: 3.7 MMOL/L (ref 3.5–5.3)
RBC # BLD AUTO: 3.94 MILLION/UL (ref 3.88–5.62)
SODIUM SERPL-SCNC: 143 MMOL/L (ref 136–145)
WBC # BLD AUTO: 6.69 THOUSAND/UL (ref 4.31–10.16)

## 2021-03-10 PROCEDURE — 85025 COMPLETE CBC W/AUTO DIFF WBC: CPT | Performed by: STUDENT IN AN ORGANIZED HEALTH CARE EDUCATION/TRAINING PROGRAM

## 2021-03-10 PROCEDURE — 97606 NEG PRS WND THER DME>50 SQCM: CPT | Performed by: PODIATRIST

## 2021-03-10 PROCEDURE — 99305 1ST NF CARE MODERATE MDM 35: CPT | Performed by: FAMILY MEDICINE

## 2021-03-10 PROCEDURE — 82948 REAGENT STRIP/BLOOD GLUCOSE: CPT

## 2021-03-10 PROCEDURE — 99024 POSTOP FOLLOW-UP VISIT: CPT | Performed by: PODIATRIST

## 2021-03-10 PROCEDURE — NC001 PR NO CHARGE: Performed by: PODIATRIST

## 2021-03-10 PROCEDURE — 99232 SBSQ HOSP IP/OBS MODERATE 35: CPT | Performed by: INTERNAL MEDICINE

## 2021-03-10 PROCEDURE — 80048 BASIC METABOLIC PNL TOTAL CA: CPT | Performed by: STUDENT IN AN ORGANIZED HEALTH CARE EDUCATION/TRAINING PROGRAM

## 2021-03-10 RX ORDER — DOXYCYCLINE HYCLATE 100 MG/1
100 CAPSULE ORAL EVERY 12 HOURS SCHEDULED
Qty: 72 CAPSULE | Refills: 0 | Status: SHIPPED | OUTPATIENT
Start: 2021-03-10 | End: 2021-04-30 | Stop reason: HOSPADM

## 2021-03-10 RX ADMIN — APIXABAN 5 MG: 5 TABLET, FILM COATED ORAL at 08:06

## 2021-03-10 RX ADMIN — LISINOPRIL 40 MG: 20 TABLET ORAL at 08:06

## 2021-03-10 RX ADMIN — ATORVASTATIN CALCIUM 40 MG: 40 TABLET, FILM COATED ORAL at 08:06

## 2021-03-10 RX ADMIN — DOXYCYCLINE 100 MG: 100 CAPSULE ORAL at 08:06

## 2021-03-10 RX ADMIN — METOPROLOL TARTRATE 100 MG: 50 TABLET, FILM COATED ORAL at 08:06

## 2021-03-10 NOTE — DISCHARGE SUMMARY
J[facility] [address] SUMMARY     Patient Name: Cassia Mckeon  Age & Sex: 78 y o  male   MRN: [id number]  Unit/Bed#: [facility] 624-01   Encounter: [id number]  Length of Stay: 25 days      Hermilo Cotter Jr  is a 78 y  o  male who was admitted on 2/16/21 due to hematoma noted to right sx site  Pt has past medical history of Diabetes, clubfoot who presented  with recent right foot reconstructive surgery  He had a midfoot fusion with external fixator application (DOS: 6/98/81)  Through out the course of admission Infectious disease, Internal medicine and Plastic surgery were consulted  Surgical interventions were done per list bellow  Pt was started on vanc/cefepime/flagyl as empiric antibiotic therapy  Pt was then transitioned to zosyn; Amoxicillin; Cefepime though out the course of his admission per ID recommendation  CTA was ordered as pre op planning for future free fasciocutaneous flap right foot scheduled for 4/7/21  Pt is stable for discharge to acute rehab [facility])  Pt was d/c with PO doxycycline through 4/15/21 per ID recommendation  Wound vac therapy to be continue at rehab  - 2/17/21: Exfix removal, I&D, fasciotomy, and wound culture: Significant hematoma noted intra op  - 2/18/21: Due to significant hematoma, extent of soft tissue damage, dusky left hallux and questionable dorsal foot soft tissue plan to wait for demarcation  - 2/22/21: TMA of left foot and hardware removal   - 2/23/21Wound vac was applied  - 3/1/21: Debridement and vac application per Plastic Surgery  - 3/4/21: Percutaneous screw fixation right medial column, wound debridement, wound vac change, and bone culture      DISCHARGE INFORMATION     PCP at Discharge: Lidia Nieves MD    Admitting Provider: Dr Vinayak Whitley DPM  Admission Date: 2/16/2021     Discharge Provider: Dr Satish Boykin DPM  Discharge Date: 03/10/21    Discharge Disposition: Acute rehab at [facility] Condition: Stable  Discharge with Lines: No  Discharge Diet: Diabetic   Activity Restrictions: NWB left lower extremity  Test Results Pending at Discharge: none  Medications at Discharge: See after visit summary for reconciled discharge medications provided to patient and family  Discharge Diagnoses: Active Problems:    Hyperlipidemia    Type 2 diabetes mellitus with diabetic neuropathy (HCC)    Atrial fibrillation with RVR (HCC)    Essential hypertension    Benign prostatic hyperplasia with lower urinary tract symptoms    Post-operative state      Consulting Providers:  Infectious disease  Plastic Surgery    Diagnostic & Therapeutic Procedures Performed:  Xr Foot 2 Vw Right    Result Date: 2/17/2021  Impression: Findings suspicious for infection involving the 4th and 5th tarsometatarsal joints, less conspicuous previously  Status post surgery Workstation performed: [id number]     Xr Foot Right 3+ Views    Result Date: 2/18/2021  Impression: Removal of the external fixation device  Otherwise stable alignment of the post surgical changes  Workstation performed: [id number]       Code Status: Level 1 - Full Code  Advance Directive and Living Will:      Power of :    POLST:      FOLLOW-UP      Providers Follow-up:   Dr Vanna Gonzalez 3/19/21   Dr Soler Cannelton 3/25/21     Active Issues Requiring Follow-Up:  Wound left foot    Discharge Statement:  I spent 25 minutes discharging the patient  This time was spent on the day of discharge  I had direct contact with the patient on the day of discharge  Additional documentation is required if more than 30 minutes were spent on discharge  Portions of the record may have been created with voice recognition software  Occasional wrong word or "sound a like" substitutions may have occurred due to the inherent limitations of voice recognition software    Read the chart carefully and recognize, using context, where substitutions have occurred   ==  Jesus Beckford, [address] [facility]  Podiatric Medicine & Surgery PGY-2

## 2021-03-10 NOTE — DISCHARGE INSTRUCTIONS
Discharge Instructions - Podiatry    Weight Bearing Status: Non-weight bearing  Right foot            Pain: Continue analgesics as directed    Follow-up appointment instructions: Please make an appointment within two weeks of discharge with Dr Rei Smith  Contact sooner if any increase in pain, or signs of infection occur    Wound Care: Leave dressings clean, dry, and intact between professional dressing changes    Nursing Instructions: Please apply Wound Vac [place] into all three wounds (medial, lateral, and plantar) applied at 125mmHg  Then cover with Gauze and secure with Kerlix and tape  Please change dressing every Monday, Wednesday, and Friday

## 2021-03-10 NOTE — PROGRESS NOTES
[address] [place], [place], [address]  History and Physical  POS: 31    Records Reviewed include: Hospital records      Chief Complaint/ Reason for Admission:   Wound s/p amputation right foot    History of Present Illness:      Mr German Short is an 77 yo male who was recently admitted to [facility] and underwent R TMA, wound vac placed, currently admitted to [facility] for [facility], goal is to return home  Will continue wound care, change wound vac MWF, follow up with plastic surgery  Continue to [facility] rfor pain and manage as needed  Continue PT/OT- goal is to return home  Denies fever, chills, CP, SOB, cough, abdominal pain, nausea  regarding DM , will continue currrent regimen with po medication , monitor accu checks fasting and at bedtime  A fib is currently rate controlled , continue anticoagulation with eliquis  Allergies    Allergies   Allergen Reactions    Simvastatin Myalgia    Itraconazole Other (See Comments)     Pt denies knowledge of allergy           Past Medical History  Past Medical History:   Diagnosis Date    Arthritis     Atrial fibrillation ([address] )     Diagnosed 11/2018    Benign prostate hyperplasia 04/2002    Cellulitis     right lower leg     Colon polyp 2006    Diabetes mellitus ([address] )     Hearing aid worn     bilat    Hearing loss     90% loss left ear and 40% right ear    History of clubfoot     "since birth"    History of pneumonia     History of TIA (transient ischemic attack) 04/25/2017 11/30/18 pt denies    Hyperlipidemia 5/15/2014    Hypertension     Infectious viral hepatitis     "cant remember type"    Irregular heart beat     Neuropathy     both feet    Osteomyelitis ([address] )     right great toe    Right middle lobe pulmonary nodule 11/6/2018    Seasickness     Teeth missing     Type 2 diabetes mellitus with diabetic neuropathy ([address] ) 11/6/2018    Wears glasses     reading    Wound, open     bottom of right foot Past Surgical History:   Procedure Laterality Date    BUNIONECTOMY Right 12/4/2018    Procedure: 5TH METATARSAL BONE PARTIAL RESECTION, FULL THICKNESS DEBRIDEMENT OF DIABETIC ULCER;  Surgeon: Petr Jones DPM;  [facility];  Service: Podiatry    CLUB FOOT RELEASE Bilateral     COLONOSCOPY      EXTERNAL FIXATOR APPLICATION Right 3/09/1451    Procedure: Application multiplane external fixation;  Surgeon: Nando James DPM;  [facility];  Service: Podiatry    FOOT HARDWARE REMOVAL Right 2/17/2021    Procedure: REMOVAL EXTERNAL FIXATOR;  Surgeon: Nando James DPM;  [facility];  Service: Podiatry    INCISION AND DRAINAGE OF WOUND Right 2/17/2021    Procedure: INCISION AND DRAINAGE (I&D) EXTREMITY;  Surgeon: Nando James DPM;  [facility];  Service: Podiatry    ORIF FOOT FRACTURE Right 3/4/2021    Procedure: VAC PLACEMENT; SCREW FIXATION RIGHT FOOT FUSION;  Surgeon: Nando James DPM;  [facility];  Service: Podiatry    [address] [place] [address] Right 2/22/2021    Procedure: AMPUTATION TRANSMETATARSAL (TMA), REMOVAL NAIL IM T2 ICF HARDWARE;  Surgeon: Nando James DPM;  [facility];  Service: Podiatry    MA FUSION FOOT BONES,MIDTARSAL,OSTEOTMY Right 2/12/2021    Procedure: foot ARTHRODESIS/FUSION;  Surgeon: Nando James DPM;  [facility];  Service: Podiatry    MA LENGTH/SHORT LEG/ANKL TENDON,SINGLE Right 2/12/2021    Procedure: LENGTHEN TIBIAL TENDON, TRANS HEEL CORD;  Surgeon: Nando James DPM;  [facility];  Service: Podiatry    MA PART [facility] BONE METATARSAL HEAD,EA Right 12/29/2020    Procedure: EXCISION EXOSTOSIS;  Surgeon: Nando James DPM;  [facility];  Service: Podiatry    MA SECD CLOS SURG WND EXTEN/COMPLIC Right 44/53/1638    Procedure: PRIMARY DELAYED CLOSURE;  Surgeon: Nando James DPM;  [facility];  Service: Podiatry    TOE AMPUTATION Right     partial great toe  VAC DRESSING APPLICATION Right 3254    Procedure: APPLICATION VAC DRESSING EXTREMITY;  Surgeon: Malachi Chand MD;  [facility];  Service: Plastics    [address] Right 3/1/2021    Procedure: DEBRIDEMENT LOWER EXTREMITY [facility] OUT); Surgeon: Malachi Chand MD;  [facility];  Service: Plastics       Family History  Family History   Problem Relation Age of Onset    Diabetes Father         mellitus       Social History  Social History     Tobacco Use   Smoking Status Former Smoker    Types: Cigarettes    Quit date: 1988    Years since quittin 3   Smokeless Tobacco Former User      Social History     Substance and Sexual Activity   Alcohol Use Yes    Frequency: 2-4 times a month    Drinks per session: 1 or 2    Comment: socially      Social History     Substance and Sexual Activity   Drug Use Not Currently        Lives: Home,  Social Support: family  Fall in the past 12 months: no  Use of assistance Device: Walker    Physical Exam    Vital Signs    Vitals:    03/10/21 1719   BP: 129/69   Pulse: 73   Resp: 18   Temp: 97 6 °F (36 4 °C)   SpO2: 99%           Constitutional: Frail appearing patient       Physical Exam  Vitals signs and nursing note reviewed  Constitutional:       General: He is not in acute distress  Appearance: He is not diaphoretic  HENT:      Head: Normocephalic and atraumatic  Ears:      Comments: [place]  Hearing aid right ear     Mouth/Throat:      Mouth: Mucous membranes are moist    Eyes:      General: No scleral icterus  Neck:      Vascular: No JVD  Cardiovascular:      Rate and Rhythm: Normal rate  Rhythm irregularly irregular  Heart sounds: Normal heart sounds  No murmur  Pulmonary:      Effort: Pulmonary effort is normal  No respiratory distress  Breath sounds: Normal breath sounds  No wheezing  Abdominal:      Palpations: Abdomen is soft  Tenderness:  There is no abdominal tenderness  There is no guarding  Musculoskeletal:      Right lower leg: No edema  Left lower leg: No edema  Comments: Walker  Right tarsal amputation   Skin:     General: Skin is warm and dry  Coloration: Skin is not pale  Comments: Wound right foot   Neurological:      General: No focal deficit present  Mental Status: He is alert and oriented to person, place, and time  Mental status is at baseline  Psychiatric:         Behavior: Behavior normal          Review of Systems:  Review of Systems   Constitutional: Negative for chills, fatigue and fever  HENT: Positive for hearing loss  Negative for congestion, rhinorrhea, sore throat and trouble swallowing  Respiratory: Negative for cough, shortness of breath and wheezing  Cardiovascular: Negative for chest pain and leg swelling  Gastrointestinal: Negative for abdominal pain, constipation and nausea  Genitourinary: Negative for dysuria and hematuria  Musculoskeletal: Positive for gait problem  Right metatarsal amputation   Skin: Positive for wound  Negative for rash  Allergic/Immunologic: Negative for environmental allergies  Neurological: Negative for dizziness, syncope, speech difficulty and weakness  Hematological: Does not bruise/bleed easily  Psychiatric/Behavioral: Negative for behavioral problems and sleep disturbance  List of Current Medications:    Medication reviewed  All orders signed  Complete list is in the paper chart  Allergies    Allergies   Allergen Reactions    Simvastatin Myalgia    Itraconazole Other (See Comments)     Pt denies knowledge of allergy           Labs/Diagnostics (reviewed by this provider): I personally reviewed lab results and imaging studies  Full reports are in the paper chart  Assessment/Plan:    Aftercare for amputation stump  Right TMA on 2/22 with wound vac application on 9/54  Will continue wound vac, change F  Wound team to follow    Continue doxycycline PO  Monitor for pain and manage as needed  Follow up with Plastic surgery  Atrial fibrillation with RVR (HCC)  Currently rate controlled, asymptomatic  Continue same regimen and monitor  Continue anticoagulation with eliquis    Type 2 diabetes mellitus with diabetic neuropathy ([facility] )    Lab Results   Component Value Date    HGBA1C 7 8 (H) 12/23/2020     Continue current regimen with po hypoglycemics  Monitor accu checks fasting and at bedtime  Ambulatory dysfunction  Will continue PT/OT  Placed on fall precautions  The goal is to return home    Essential hypertension  Currently at goal   Will continue current regimen with holding parameters    Encourage low salt diet       Pain: no  Rehab Potential:Good  Patient Informed of Medical Condition: yes   Patient is Capable of Understanding Their Right: yes   Discharge Plan: home  Vaccination:   Immunization History   Administered Date(s) Administered    INFLUENZA 12/04/2014, 09/30/2016, 09/30/2016, 11/19/2018    Influenza Split 01/14/2011, 12/20/2011, 11/26/2013    Influenza, high dose seasonal 0 7 mL 09/05/2018, 09/03/2019, 12/23/2020    Influenza, seasonal, injectable 09/11/2009, 12/04/2014    Pneumococcal Conjugate 13-Valent 10/24/2017, 11/17/2017    Pneumococcal Polysaccharide PPV23 03/06/2007, 01/04/2019    Zoster 09/18/2015       Code status:Full Code  PCP: MD Melo De Jesus MD  Geriatric Medicine  0/57/049401:44 PM

## 2021-03-10 NOTE — PROGRESS NOTES
Progress Note - Infectious Disease   Tracey Tejeda  78 y o  male MRN: [id number]  Unit/Bed#: [facility] 624-01 Encounter: [id number]      Impression/Plan:    1- Postoperative bleeding  complicated by hematoma formation,  compartment syndrome,  with superimposed infection and suspected foot osteomyelitis:  Patient was taken to operation room 2/17/2021 where he underwent external fixator removal, hematoma evacuation, fasciotomy   Small amount of purulence noted   Wound cultures collected intraoperatively from lateral and medial aspect of the foot both growing Enterobacter cloaca   E faecalis  also growing in 1 operative culture specimen  Due to progressive ischemia, patient was taken again to OR 02/22/2021 where he underwent TMA   Operative report reviewed and case discussed with podiatrist Dr Summer De Oliveira was removed   All infected tissue removed   Patient had been on IV Zosyn previously   With hardware removal, he was transition to amoxicillin/doxycycline  Patient underwent I&D with screw fixation of right foot on 03/04   Bone was hard and normal at surgery   However, bone scraping culture grew Enterobacter    patient currently on doxycycline p o  Which he is tolerating well with no apparent issues;   -continue doxycycline 100 mg p o  Q 12 hours for 6 weeks postop  through 04/15/2021 in setting of positive bone culture  Antibiotic option with cefepime IV through PICC line, versus doxycycline p o  Discussed in details yesterday with patient and his wife present at bedside   Risk versus benefit of cefepime and outpatient IV antibiotic treatment compared to p o  Doxycycline for osteomyelitis discussed   Discussion regarding measures to avoid doxycycline induced photosensitivity or pill esophagitis discussed with patient  -  Will check CBC, CMP in 2 weeks, then ID follow-up in 3 weeks as outpatient    Appointment entered in [facility] for discharge from ID standpoint             2- diabetes mellitus type 2:  Risk factor for problem 1   - management as per primary service team       3- atrial fibrillation:  -rate control and anticoagulation as per primary service team     Discussed with the patient and his wife present at bedside in detail regarding the above plan  Discussed with Podiatry service     Antibiotics:  Postop day 6  Doxycycline day 2    Subjective:  Patient has no fever, chills, sweats; no nausea, vomiting, diarrhea; no cough, shortness of breath; no pain  No new symptoms  Objective:  Vitals:  Temp:  [97 7 °F (36 5 °C)-98 5 °F (36 9 °C)] 97 7 °F (36 5 °C)  HR:  [80-92] 88  Resp:  [17-18] 18  BP: (124-133)/(77-93) 133/86  SpO2:  [96 %-100 %] 100 %  Temp (24hrs), Av 2 °F (36 8 °C), Min:97 7 °F (36 5 °C), Max:98 5 °F (36 9 °C)  Current: Temperature: 97 7 °F (36 5 °C)    Physical Exam:   General Appearance:  Alert, interactive, nontoxic, no acute distress  Throat: Oropharynx moist without lesions  Lungs:   Clear to auscultation bilaterally; no wheezes, rhonchi or rales; respirations unlabored   Heart:  RRR; no murmur, rub or gallop   Abdomen:   Soft, non-tender, non-distended, positive bowel sounds  Extremities: No clubbing, cyanosis or edema   Skin: No new rashes or lesions  No draining wounds noted         Labs, Imaging, & Other studies:   All pertinent labs and imaging studies were personally reviewed  Results from last 7 days   Lab Units 03/10/21  0513 03/08/21  0534 21  1139   WBC Thousand/uL 6 69 6 88 7 58   HEMOGLOBIN g/dL 11 7* 11 5* 13 1   PLATELETS Thousands/uL 339 376 425*     Results from last 7 days   Lab Units 03/10/21  0513 03/08/21  0534 21  1137   SODIUM mmol/L 143 142 138   POTASSIUM mmol/L 3 7 4 1 4 1   CHLORIDE mmol/L 110* 112* 107   CO2 mmol/L 28 25 26   BUN mg/dL 17 12 18   CREATININE mg/dL 0 85 0 90 1 07   EGFR ml/min/1 73sq m 90 87 71   CALCIUM mg/dL 8 9 8 8 9 2     Results from last 7 days   Lab Units 21  194   GRAM STAIN RESULT  No Polys or Bacteria seen

## 2021-03-10 NOTE — PLAN OF CARE
Problem: Potential for Falls  Goal: Patient will remain free of falls  Description: INTERVENTIONS:  - Assess patient frequently for physical needs  -  Identify cognitive and physical deficits and behaviors that affect risk of falls    -  [place] fall precautions as indicated by assessment   - Educate patient/family on patient safety including physical limitations  - Instruct patient to call for assistance with activity based on assessment  - Modify environment to reduce risk of injury  - Consider OT/PT consult to assist with strengthening/mobility  Outcome: Progressing     Problem: Prexisting or High Potential for Compromised Skin Integrity  Goal: Skin integrity is maintained or improved  Description: INTERVENTIONS:  - Identify patients at risk for skin breakdown  - Assess and monitor skin integrity  - Assess and monitor nutrition and hydration status  - Monitor labs   - Assess for incontinence   - Turn and reposition patient  - Assist with mobility/ambulation  - Relieve pressure over bony prominences  - Avoid friction and shearing  - Provide appropriate hygiene as needed including keeping skin clean and dry  - Evaluate need for skin moisturizer/barrier cream  - Collaborate with interdisciplinary team   - Patient/family teaching  - Consider wound care consult   Outcome: Progressing     Problem: PAIN - ADULT  Goal: Verbalizes/displays adequate comfort level or baseline comfort level  Description: Interventions:  - Encourage patient to monitor pain and request assistance  - Assess pain using appropriate pain scale  - Administer analgesics based on type and severity of pain and evaluate response  - Implement non-pharmacological measures as appropriate and evaluate response  - Consider cultural and social influences on pain and pain management  - Notify physician/advanced practitioner if interventions unsuccessful or patient reports new pain  Outcome: Progressing     Problem: INFECTION - ADULT  Goal: Absence or prevention of progression during hospitalization  Description: INTERVENTIONS:  - Assess and monitor for signs and symptoms of infection  - Monitor lab/diagnostic results  - Monitor all insertion sites, i e  indwelling lines, tubes, and drains  - Monitor endotracheal if appropriate and nasal secretions for changes in amount and color  - [place] appropriate cooling/warming therapies per order  - Administer medications as ordered  - Instruct and encourage patient and family to use good hand hygiene technique  - Identify and instruct in appropriate isolation precautions for identified infection/condition  Outcome: Progressing     Problem: SAFETY ADULT  Goal: Patient will remain free of falls  Description: INTERVENTIONS:  - Assess patient frequently for physical needs  -  Identify cognitive and physical deficits and behaviors that affect risk of falls    -  [place] fall precautions as indicated by assessment   - Educate patient/family on patient safety including physical limitations  - Instruct patient to call for assistance with activity based on assessment  - Modify environment to reduce risk of injury  - Consider OT/PT consult to assist with strengthening/mobility  Outcome: Progressing  Goal: Maintain or return to baseline ADL function  Description: INTERVENTIONS:  -  Assess patient's ability to carry out ADLs; assess patient's baseline for ADL function and identify physical deficits which impact ability to perform ADLs (bathing, care of mouth/teeth, toileting, grooming, dressing, etc )  - Assess/evaluate cause of self-care deficits   - Assess range of motion  - Assess patient's mobility; develop plan if impaired  - Assess patient's need for assistive devices and provide as appropriate  - Encourage maximum independence but intervene and supervise when necessary  - Involve family in performance of ADLs  - Assess for home care needs following discharge   - Consider OT consult to assist with ADL evaluation and planning for discharge  - Provide patient education as appropriate  Outcome: Progressing  Goal: Maintain or return mobility status to optimal level  Description: INTERVENTIONS:  - Assess patient's baseline mobility status (ambulation, transfers, stairs, etc )    - Identify cognitive and physical deficits and behaviors that affect mobility  - Identify mobility aids required to assist with transfers and/or ambulation (gait belt, sit-to-stand, lift, walker, cane, etc )  - [place] fall precautions as indicated by assessment  - Record patient progress and toleration of activity level on Mobility SBAR; progress patient to next Phase/Stage  - Instruct patient to call for assistance with activity based on assessment  - Consider rehabilitation consult to assist with strengthening/weightbearing, etc   Outcome: Progressing     Problem: DISCHARGE PLANNING  Goal: Discharge to home or other facility with appropriate resources  Description: INTERVENTIONS:  - Identify barriers to discharge w/patient and caregiver  - Arrange for needed discharge resources and transportation as appropriate  - Identify discharge learning needs (meds, wound care, etc )  - Arrange for interpretive services to assist at discharge as needed  - Refer to Case Management Department for coordinating discharge planning if the patient needs post-hospital services based on physician/advanced practitioner order or complex needs related to functional status, cognitive ability, or social support system  Outcome: Progressing     Problem: METABOLIC, FLUID AND ELECTROLYTES - ADULT  Goal: Glucose maintained within target range  Description: INTERVENTIONS:  - Monitor Blood Glucose as ordered  - Assess for signs and symptoms of hyperglycemia and hypoglycemia  - Administer ordered medications to maintain glucose within target range  - Assess nutritional intake and initiate nutrition service referral as needed  Outcome: Progressing     Problem: MUSCULOSKELETAL - ADULT  Goal: Maintain or return mobility to safest level of function  Description: INTERVENTIONS:  - Assess patient's ability to carry out ADLs; assess patient's baseline for ADL function and identify physical deficits which impact ability to perform ADLs (bathing, care of mouth/teeth, toileting, grooming, dressing, etc )  - Assess/evaluate cause of self-care deficits   - Assess range of motion  - Assess patient's mobility  - Assess patient's need for assistive devices and provide as appropriate  - Encourage maximum independence but intervene and supervise when necessary  - Involve family in performance of ADLs  - Assess for home care needs following discharge   - Consider OT consult to assist with ADL evaluation and planning for discharge  - Provide patient education as appropriate  Outcome: Progressing  Goal: Maintain proper alignment of affected body part  Description: INTERVENTIONS:  - Support, maintain and protect limb and body alignment  - Provide patient/ family with appropriate education  Outcome: Progressing No

## 2021-03-10 NOTE — PROGRESS NOTES
Podiatry - Progress Note  Patient: Wai Robles  78 y o  male   MRN: [id number]  PCP: Geena Rapp MD  Unit/Bed#: [facility] 313-91 Encounter: [id number]  Date Of Visit: 03/10/21    ASSESSMENT:    Wai Robles  is a 78 y o  male with:    1  Right foot hematoma, surgical site infection, ischemia  - S/p exfix removal, I&D, fasciotomy (DOS 21)  - s/p TMA of left foot   - s/p debridement and vac application per JDNAJCURTUS (56)  - s/p Percutaneous screw fixation right medial column, wound debridement and wound vac change (3/4/21)  2  Congenital talipes equinovarus right foot             - s/p midfoot arthrodesis, exfix application (DOS )  3  T2Dm, last A1C 7 8 %  4  HTN  5  Altered mental status  6  Atrial fibrillation  7  Sepsis on admission - resolved    PLAN:    · Wound VAC changed as below  Wounds appear stable, slowly granulating with VAC  No acute signs of infection noted  Incisions remain stable with sutures intact  · Case management on board for discharge planning/placement  · Elevation on green foam wedges or pillows when non-ambulatory  · Appreciate consulting services for recommendations and management  Antibiotics started:  Doxycycline p o  through 04/15/2021  Pharmacologic VTE Prophylaxis:  Eliquis  Mechanical VTE Prophylaxis: sequential compression device   Weightbearing status:  Nonweightbearing left lower extremity    Disposition:  Patient requires continued stay for placement  Wound VAC application  1  Number of sponges: 4 black removed, 4 black applied  2  Pressure settin continuous  3  Size of wound:  See below  4  Description of wound:  See below    A Verbal time out was performed confirming correct patient and extremity  Nursing was communicated with regarding number of sponges removed and implanted      SUBJECTIVE:     The patient was seen, evaluated, and assessed at bedside today  The patient was awake, alert, and in no acute distress   No acute events overnight  The patient reports no complaints at this time  Patient denies N/V/F/chills/SOB/CP  OBJECTIVE:     Vitals:   /86   Pulse 88   Temp 97 7 °F (36 5 °C) (Oral)   Resp 18   Ht 6' 4" (1 93 m)   Wt 109 kg (240 lb 15 4 oz)   SpO2 100%   BMI 29 33 kg/m²     Temp (24hrs), Av 2 °F (36 8 °C), Min:97 7 °F (36 5 °C), Max:98 5 °F (36 9 °C)      Physical Exam:     General: Alert, cooperative and no distress  Lungs: Non labored breathing  Abdomen: Soft, non-tender  Lower extremity exam:  Cardiovascular status at baseline  Neurological status at baseline  Musculoskeletal status at baseline  No calf tenderness noted       Lower extremity wound(s) as noted below:  Wound #:  1  Location:  Medial arch  Length 2 5cm: Width 0 8cm: Depth 0 1cm:   Deepest Tissue Noted in Base:  Subcu  Probe to Bone: No  Peripheral Skin Description: Attached  Granulation: 50% Fibrotic Tissue: 50% Necrotic Tissue: 0%   Drainage Amount: minimal, serosanguinous  Signs of Infection: No     Wound #:  2  Location: Dorsal medial foot  Length 7 5cm: Width 4cm: Depth 1 5cm:   Deepest Tissue Noted in Base:  Bone  Probe to Bone: Yes  Peripheral Skin Description: Attached  Granulation: 40% Fibrotic Tissue: 60% Necrotic Tissue: 0%   Drainage Amount: minimal, serosanguinous  Signs of Infection: No     Wound #:  3  Location: Lateral foot  Length 8cm: Width 5cm: Depth 1 5cm:   Deepest Tissue Noted in Base:  Subcu  Probe to Bone: No  Peripheral Skin Description: Attached  Granulation: 70% Fibrotic Tissue: 30% Necrotic Tissue: 0%   Drainage Amount: minimal, serosanguinous  Signs of Infection: No     Clinical Images 03/10/21:                  Additional Data:     Labs:    Results from last 7 days   Lab Units 03/10/21  0513   WBC Thousand/uL 6 69   HEMOGLOBIN g/dL 11 7*   HEMATOCRIT % 37 6   PLATELETS Thousands/uL 339   NEUTROS PCT % 55   LYMPHS PCT % 33   MONOS PCT % 8   EOS PCT % 3     Results from last 7 days   Lab Units 03/10/21  0509 POTASSIUM mmol/L 3 7   CHLORIDE mmol/L 110*   CO2 mmol/L 28   BUN mg/dL 17   CREATININE mg/dL 0 85   CALCIUM mg/dL 8 9           * I Have Reviewed All Lab Data Listed Above  Recent Cultures (last 7 days):     Results from last 7 days   Lab Units 03/04/21 1944   GRAM STAIN RESULT  No Polys or Bacteria seen     Results from last 7 days   Lab Units 03/04/21 1944   ANAEROBIC CULTURE  No anaerobes isolated       Imaging: I have personally reviewed pertinent films in PACS  EKG, Pathology, and Other Studies: I have personally reviewed pertinent reports  ** Please Note: Portions of the record may have been created with voice recognition software  Occasional wrong word or "sound a like" substitutions may have occurred due to the inherent limitations of voice recognition software  Read the chart carefully and recognize, using context, where substitutions have occurred   **

## 2021-03-11 ENCOUNTER — TRANSITIONAL CARE MANAGEMENT (OUTPATIENT)
Dept: FAMILY MEDICINE CLINIC | Facility: CLINIC | Age: 71
End: 2021-03-11

## 2021-03-11 ENCOUNTER — TELEPHONE (OUTPATIENT)
Dept: INFECTIOUS DISEASES | Facility: CLINIC | Age: 71
End: 2021-03-11

## 2021-03-11 DIAGNOSIS — L02.91 ABSCESS: Primary | ICD-10-CM

## 2021-03-11 NOTE — ASSESSMENT & PLAN NOTE
Lab Results   Component Value Date    HGBA1C 7 8 (H) 12/23/2020     Continue current regimen with po hypoglycemics  Monitor accu checks fasting and at bedtime

## 2021-03-11 NOTE — ASSESSMENT & PLAN NOTE
Right TMA on 2/22 with wound vac application on 0/44  Will continue wound vac, change MWF  Wound team to follow  Continue doxycycline PO  Monitor for pain and manage as needed  Follow up with Plastic surgery

## 2021-03-11 NOTE — ASSESSMENT & PLAN NOTE
Currently at goal   Will continue current regimen with holding parameters    Encourage low salt diet

## 2021-03-11 NOTE — TELEPHONE ENCOUNTER
Spoke with Anders Zhang from [facility]  She confirmed pt is there and is aware of biweekly labs starting 3/17 and 3/31   Faxed over to [phone]

## 2021-03-12 ENCOUNTER — NURSING HOME VISIT (OUTPATIENT)
Dept: GERIATRICS | Facility: OTHER | Age: 71
End: 2021-03-12
Payer: COMMERCIAL

## 2021-03-12 VITALS
OXYGEN SATURATION: 97 % | HEART RATE: 86 BPM | TEMPERATURE: 97.7 F | RESPIRATION RATE: 16 BRPM | DIASTOLIC BLOOD PRESSURE: 88 MMHG | SYSTOLIC BLOOD PRESSURE: 120 MMHG

## 2021-03-12 DIAGNOSIS — Z47.81 AFTERCARE FOR AMPUTATION STUMP: ICD-10-CM

## 2021-03-12 DIAGNOSIS — R26.2 AMBULATORY DYSFUNCTION: ICD-10-CM

## 2021-03-12 DIAGNOSIS — I10 ESSENTIAL HYPERTENSION: Chronic | ICD-10-CM

## 2021-03-12 DIAGNOSIS — I48.91 ATRIAL FIBRILLATION WITH RVR (HCC): ICD-10-CM

## 2021-03-12 DIAGNOSIS — E11.40 TYPE 2 DIABETES MELLITUS WITH DIABETIC NEUROPATHY, WITHOUT LONG-TERM CURRENT USE OF INSULIN (HCC): Primary | Chronic | ICD-10-CM

## 2021-03-12 PROCEDURE — 99309 SBSQ NF CARE MODERATE MDM 30: CPT | Performed by: NURSE PRACTITIONER

## 2021-03-12 NOTE — ASSESSMENT & PLAN NOTE
· Had a R TMA ON 02/22/21, wound vac placement on 02/23/21  · Wound care as ordered  · Wound vac currently in place on RLE, change by nursing  · Continue with Doxycycline BID   · NWB to RLE  · Wound care following  · Follow up with plastic surgery at hospital on 04/07/21 for debridement of R foot and reconstruction w/ free fasciocutaneous flap to right foot

## 2021-03-12 NOTE — PROGRESS NOTES
[facility]  [address]  [phone]  [facility] SNF List: [facility]   Code 31      NAME: Rush Solorio  AGE: 78 y o   SEX: male    DATE OF ENCOUNTER: 3/12/2021    Assessment and Plan     Problem List Items Addressed This Visit        Endocrine    Type 2 diabetes mellitus with diabetic neuropathy (HCC) - Primary (Chronic)     · Fasting blood sugars 260 yesterday and 92 this morning, PM blood sugars 310 and 216  · Will change regular diet to diabetic diet   · Continue with current regimen of  Glimperide, Januvia, and Metformin   · Will continue to monitor and adjust medications as needed   · Continue with accu checks  · Encourage dietary and lifestyle modifcations  · Will recheck A1C   Lab Results   Component Value Date    HGBA1C 7 8 (H) 12/23/2020               Cardiovascular and Mediastinum    Essential hypertension (Chronic)     · Blood pressure log reviewed and stable  · Currently at blood pressure goal   · Continue with current regimen of Lisinopril and Diltiazem  · Encourage low sodium diet  · Encourage lifestyle modifcations  · Continue to monitor          Atrial fibrillation with RVR (HCC)     · Currently rate controlled and asymptotic  · Denies chest pain or palpitations   · Continue with current regimen of Diltiazem and eliquis for anticoagulation  · Continue to monitor closely             Other    Aftercare for amputation stump     · Had a R TMA ON 02/22/21, wound vac placement on 02/23/21  · Wound care as ordered  · Wound vac currently in place on RLE, change by nursing  · Continue with Doxycycline BID   · NWB to RLE  · Wound care following  · Follow up with plastic surgery at hospital on 04/07/21 for debridement of R foot and reconstruction w/ free fasciocutaneous flap to right foot         Ambulatory dysfunction     · Currently transfers ambulates with extensive assist x1 with RW, NWB to RLE   · Continue with  PT/OT  · Fall precautions  · Goal to be discharge home               No orders of the defined types were placed in this encounter       - Counseling Documentation: patient was counseled regarding: diagnostic results, instructions for management, impressions, risks and benefits of treatment options and importance of compliance with treatment    Chief Complaint     No chief complaint on file  History of Present Illness     Krishna Villalobos is a 78year old male at [facility] for short term rehab after a recent hospitalization at [facility] for a R TMA with wound vac placement  Still remains with wound vac to R foot  To go back to hospital on 04/07/21 for surgery with plastic surgery  His comorbidities include atrial fibrillation, hypertension, hyperlipidemia, and type 2 diabetes mellitus  Upon examination pt was laying in bed resting comfortably  He denies headaches, dizziness, SOB, chest pain, abdominal pain, and gi/gu upset  His pain is well controlled, only a 2 out of 10 with wound vac dressing changes  He has a good appetite, discussed with him about changing his diet from regular to diabetic which he is agreeable to  The following portions of the patient's history were reviewed and updated as appropriate: allergies, current medications, past family history, past medical history, past social history, past surgical history and problem list     Review of Systems     Review of Systems   Constitutional: Negative for activity change, appetite change, chills, fatigue and fever  HENT: Positive for hearing loss  Negative for sneezing and sore throat  Eyes: Negative for visual disturbance  Respiratory: Negative for cough, chest tightness and shortness of breath  Cardiovascular: Negative for chest pain and palpitations  Gastrointestinal: Negative for abdominal pain and vomiting  Genitourinary: Negative for difficulty urinating  Musculoskeletal: Positive for gait problem (R TMA)  Negative for arthralgias and back pain  Skin: Negative for color change and rash  Neurological: Negative for dizziness, syncope and headaches  All other systems reviewed and are negative  Active Problem List     Patient Active Problem List   Diagnosis    Hyperlipidemia    Type 2 diabetes mellitus with diabetic neuropathy (HCC)    Atrial fibrillation with RVR ([facility] [address] )    Right middle lobe pulmonary nodule    Essential hypertension    Acquired absence of other right toe(s) ([facility] [address] )    Benign prostatic hyperplasia with lower urinary tract symptoms    Positive for microalbuminuria    Deep disruption or dehiscence of operation wound    Post-operative state    Aftercare for amputation stump    Ambulatory dysfunction       Objective     /88   Pulse 86   Temp 97 7 °F (36 5 °C)   Resp 16   SpO2 97%     Physical Exam  Vitals signs and nursing note reviewed  Constitutional:       General: He is not in acute distress  Appearance: Normal appearance  He is not ill-appearing  HENT:      Head: Normocephalic  Neck:      Musculoskeletal: Normal range of motion  Cardiovascular:      Rate and Rhythm: Normal rate  Rhythm irregular  Pulses: Normal pulses  Heart sounds: Normal heart sounds  No murmur  Pulmonary:      Effort: Pulmonary effort is normal  No respiratory distress  Breath sounds: Normal breath sounds  No wheezing  Abdominal:      General: Bowel sounds are normal  There is no distension  Palpations: Abdomen is soft  Tenderness: There is no abdominal tenderness  Musculoskeletal:      Right lower leg: No edema  Left lower leg: No edema  Comments: Wound withw ound vac in place to RLE   Skin:     General: Skin is warm and dry  Coloration: Skin is not jaundiced or pale  Neurological:      General: No focal deficit present  Mental Status: He is alert and oriented to person, place, and time  Mental status is at baseline  Motor: Weakness (NMW to RLE, wound vac in place) present  Gait: Gait abnormal (walker OOB)  Psychiatric:         Mood and Affect: Mood normal          Behavior: Behavior normal          Pertinent Laboratory/Diagnostic Studies:  Labs reviewed in facility paper chart  Current Medications   Medications were reviewed and updated  Please refer to paper chart for updated list of medications      Christopher Dickson  3/12/2021 10:36 AM

## 2021-03-12 NOTE — ASSESSMENT & PLAN NOTE
· Currently transfers ambulates with extensive assist x1 with RW, NWB to RLE   · Continue with  PT/OT  · Fall precautions  · Goal to be discharge home

## 2021-03-12 NOTE — ASSESSMENT & PLAN NOTE
· Blood pressure log reviewed and stable  · Currently at blood pressure goal   · Continue with current regimen of Lisinopril and Diltiazem  · Encourage low sodium diet  · Encourage lifestyle modifcations  · Continue to monitor

## 2021-03-12 NOTE — ASSESSMENT & PLAN NOTE
· Fasting blood sugars 260 yesterday and 92 this morning, PM blood sugars 310 and 216  · Will change regular diet to diabetic diet   · Continue with current regimen of  Glimperide, Januvia, and Metformin   · Will continue to monitor and adjust medications as needed   · Continue with accu checks  · Encourage dietary and lifestyle modifcations  · Will recheck A1C   Lab Results   Component Value Date    HGBA1C 7 8 (H) 12/23/2020

## 2021-03-12 NOTE — ASSESSMENT & PLAN NOTE
· Currently rate controlled and asymptotic  · Denies chest pain or palpitations   · Continue with current regimen of Diltiazem and eliquis for anticoagulation  · Continue to monitor closely

## 2021-03-15 ENCOUNTER — NURSING HOME VISIT (OUTPATIENT)
Dept: GERIATRICS | Facility: OTHER | Age: 71
End: 2021-03-15
Payer: COMMERCIAL

## 2021-03-15 VITALS
DIASTOLIC BLOOD PRESSURE: 81 MMHG | TEMPERATURE: 96.9 F | RESPIRATION RATE: 18 BRPM | HEART RATE: 67 BPM | SYSTOLIC BLOOD PRESSURE: 145 MMHG | OXYGEN SATURATION: 97 %

## 2021-03-15 DIAGNOSIS — I48.91 ATRIAL FIBRILLATION WITH RVR (HCC): ICD-10-CM

## 2021-03-15 DIAGNOSIS — E11.40 TYPE 2 DIABETES MELLITUS WITH DIABETIC NEUROPATHY, WITHOUT LONG-TERM CURRENT USE OF INSULIN (HCC): Primary | Chronic | ICD-10-CM

## 2021-03-15 DIAGNOSIS — R26.2 AMBULATORY DYSFUNCTION: ICD-10-CM

## 2021-03-15 DIAGNOSIS — I10 ESSENTIAL HYPERTENSION: Chronic | ICD-10-CM

## 2021-03-15 DIAGNOSIS — Z47.81 AFTERCARE FOR AMPUTATION STUMP: ICD-10-CM

## 2021-03-15 PROCEDURE — 99309 SBSQ NF CARE MODERATE MDM 30: CPT | Performed by: NURSE PRACTITIONER

## 2021-03-15 NOTE — ASSESSMENT & PLAN NOTE
· NWB to RLE  · Currently using walker OOB, is an assist x1 with ambulation and tranfers  · Continue with PT/OT for strength and balance training  · Fall precautions

## 2021-03-15 NOTE — PROGRESS NOTES
[facility]  [address]  [phone]  Senior Care SNF List: [facility]   Code 31      NAME: Cecil Draper  AGE: 78 y o   SEX: male    DATE OF ENCOUNTER: 3/15/2021    Assessment and Plan     Problem List Items Addressed This Visit        Endocrine    Type 2 diabetes mellitus with diabetic neuropathy ([address] ) - Primary (Chronic)     · Fasting blood sugars ranging from , bedtime blood sugars 126-200  · Continue on medication regimen of Glimepiride, Januvia, and Metformin  · Continue with diabetic diet  · Continue with accuchecks  · A1C to be rechecked on 03/17/21  Lab Results   Component Value Date    HGBA1C 7 8 (H) 12/23/2020               Cardiovascular and Mediastinum    Essential hypertension (Chronic)     · Blood pressure log reviewed and stable  · Continue with current medication regimen of Lisinopril and Diltiazem  · Encourage dietary and lifestyle modifications  · Continue to monitor         Atrial fibrillation with RVR (HCC)     · Rate controlled  · Asymptomatic , denies chest pain or palpitations  · Continue with current medication regimen of Diltiazem and Eliquis  · Continue to monitor            Other    Aftercare for amputation stump     · S/P right TMA ON 02/22/21, Wound vac placement on 02/23/21  · Wound vac remaining in place,continue with wound vac changes MWF  · Continue with wound care as ordered  · MWB to RLE  · Follow up with plastic surgery as recommended, plan for surgery in hospital 04/07/21         Ambulatory dysfunction     · NWB to RLE  · Currently using walker OOB, is an assist x1 with ambulation and tranfers  · Continue with PT/OT for strength and balance training  · Fall precautions               No orders of the defined types were placed in this encounter       - Counseling Documentation: patient was counseled regarding: instructions for management, risk factor reductions, risks and benefits of treatment options and importance of compliance with treatment    Chief Complaint     No chief complaint on file  History of Present Illness     Moy Pires is an 78year old male at [address] [facility], for short term rehab after a recent hospitalization for R TMA, with wound vac placement  Wound vac remains in place, being changed by nursing MWF  To return to the hospital on 04/07/2021 for surgery with the Plastic surgery group  Comorbidities include hypertension, type 2 diabetes mellitus, hyperlipidemia, and atrial fibrillation  Upon examination pt was laying in bed,   He had no complaints  or concerns  Wound VAC intact to right lower extremity  He states that his appetite is good and his pain is well controlled  He denies headaches, dizziness, sob, chest pain, and GI/ upset  The following portions of the patient's history were reviewed and updated as appropriate: allergies, current medications, past family history, past medical history, past social history, past surgical history and problem list     Review of Systems     Review of Systems   Constitutional: Negative for activity change, appetite change, chills and fever  HENT: Negative for congestion and sore throat  Eyes: Negative for visual disturbance  Respiratory: Negative for cough, chest tightness and shortness of breath  Cardiovascular: Negative for chest pain and palpitations  Gastrointestinal: Negative for abdominal pain, constipation, diarrhea, nausea and vomiting  Genitourinary: Negative for difficulty urinating and dysuria  Musculoskeletal: Positive for gait problem  Negative for arthralgias and back pain  Skin: Negative for color change and rash  Neurological: Positive for weakness (RLE, s/p R TMA wound wound vac in place)  Negative for dizziness, seizures, syncope and light-headedness  All other systems reviewed and are negative        Active Problem List     Patient Active Problem List   Diagnosis    Hyperlipidemia    Type 2 diabetes mellitus with diabetic neuropathy (HCC)    Atrial fibrillation with RVR (HCC)    Right middle lobe pulmonary nodule    Essential hypertension    Acquired absence of other right toe(s) (HCC)    Benign prostatic hyperplasia with lower urinary tract symptoms    Positive for microalbuminuria    Deep disruption or dehiscence of operation wound    Post-operative state    Aftercare for amputation stump    Ambulatory dysfunction       Objective     /81   Pulse 67   Temp (!) 96 9 °F (36 1 °C)   Resp 18   SpO2 97%     Physical Exam  Vitals signs and nursing note reviewed  Constitutional:       General: He is not in acute distress  Appearance: Normal appearance  He is not ill-appearing or toxic-appearing  HENT:      Head: Normocephalic  Cardiovascular:      Rate and Rhythm: Normal rate  Rhythm irregularly irregular  Pulses: Normal pulses  Heart sounds: Normal heart sounds  No murmur  Pulmonary:      Effort: Pulmonary effort is normal  No respiratory distress  Breath sounds: Normal breath sounds  No wheezing  Abdominal:      General: Bowel sounds are normal  There is no distension  Palpations: Abdomen is soft  Tenderness: There is no abdominal tenderness  Musculoskeletal:      Right lower leg: Edema (non pititng) present  Left lower leg: No edema  Comments: Wound on RLE, wound vac in place   Skin:     General: Skin is warm and dry  Coloration: Skin is not jaundiced or pale  Neurological:      General: No focal deficit present  Mental Status: He is alert and oriented to person, place, and time  Mental status is at baseline  Motor: Weakness (weakness to RLE) present  Gait: Gait abnormal (NWB to RLE)  Psychiatric:         Mood and Affect: Mood normal          Behavior: Behavior normal          Pertinent Laboratory/Diagnostic Studies:  Labs reviewed in facility paper chart  Current Medications   Medications were reviewed and updated   Please refer to paper chart for updated list of medications      Ev BETH  3/15/2021 1:16 PM

## 2021-03-15 NOTE — ASSESSMENT & PLAN NOTE
· S/P right TMA ON 02/22/21, Wound vac placement on 02/23/21  · Wound vac remaining in place,continue with wound vac changes MWF  · Continue with wound care as ordered  · MWB to RLE  · Follow up with plastic surgery as recommended, plan for surgery in hospital 04/07/21

## 2021-03-15 NOTE — ASSESSMENT & PLAN NOTE
· Blood pressure log reviewed and stable  · Continue with current medication regimen of Lisinopril and Diltiazem  · Encourage dietary and lifestyle modifications  · Continue to monitor

## 2021-03-15 NOTE — ASSESSMENT & PLAN NOTE
· Fasting blood sugars ranging from , bedtime blood sugars 126-200  · Continue on medication regimen of Glimepiride, Januvia, and Metformin  · Continue with diabetic diet  · Continue with accuchecks  · A1C to be rechecked on 03/17/21  Lab Results   Component Value Date    HGBA1C 7 8 (H) 12/23/2020

## 2021-03-15 NOTE — ASSESSMENT & PLAN NOTE
· Rate controlled  · Asymptomatic , denies chest pain or palpitations  · Continue with current medication regimen of Diltiazem and Eliquis  · Continue to monitor

## 2021-03-16 ENCOUNTER — NURSING HOME VISIT (OUTPATIENT)
Dept: WOUND CARE | Facility: HOSPITAL | Age: 71
End: 2021-03-16
Payer: COMMERCIAL

## 2021-03-16 DIAGNOSIS — E11.621 DIABETIC ULCER OF RIGHT MIDFOOT ASSOCIATED WITH TYPE 2 DIABETES MELLITUS, WITH FAT LAYER EXPOSED (HCC): ICD-10-CM

## 2021-03-16 DIAGNOSIS — L97.412 DIABETIC ULCER OF RIGHT MIDFOOT ASSOCIATED WITH TYPE 2 DIABETES MELLITUS, WITH FAT LAYER EXPOSED (HCC): ICD-10-CM

## 2021-03-16 DIAGNOSIS — E11.40 TYPE 2 DIABETES MELLITUS WITH DIABETIC NEUROPATHY, UNSPECIFIED WHETHER LONG TERM INSULIN USE (HCC): Primary | ICD-10-CM

## 2021-03-16 PROCEDURE — 99305 1ST NF CARE MODERATE MDM 35: CPT | Performed by: NURSE PRACTITIONER

## 2021-03-16 NOTE — ASSESSMENT & PLAN NOTE
Right foot - plantar  - 10 % slough, 90% granulation  - no sign of infection  - local wound care - silver alginate for autolytic debridement  - Patient is for discharge on Friday and have a follow up appointment with Dr Zeferino Quintana

## 2021-03-16 NOTE — PROGRESS NOTES
[address] MANAGEMENT   AND [facility]PERBARIC MEDICINE CENTER       Patient ID: Laura Banda  is a 78 y o  male Date of Birth 1950     Location of Service: [address] [place] [address],8W    Performed wound round with: Wound team      Chief Complaint   Patient presents with   [address] [address] [address] [address] [facility] [address] [facility]     Right foot       Wound Instructions:  Orders Placed This Encounter   Procedures    Wound cleansing and dressings     Location : Right foot plantar  Cleanse with NSS   Apply silver alginate to wound bed and cover with bordered gauze  Frequency : Three times a week and prn for soiling  Continue the wound vac order for the Right TMA  Follow up appointment with Dr Hudson Girard  Continue to offload  Monitor for any sign of infection  Inform patient's PCP in the facility of any worsening or infection  Standing Status:   Future     Standing Expiration Date:   3/16/2022       Allergies  Simvastatin and Itraconazole      Assessment & Plan:  1  Type 2 diabetes mellitus with diabetic neuropathy, unspecified whether long term insulin use ([facility] )  Assessment & Plan:  - As per medical record review, patient FBS ranges from 89 - 200  - manage by [facility] care    Orders:  -     Wound cleansing and dressings; Future    2  Diabetic ulcer of right 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, with fat layer exposed ([address] )  Assessment & Plan:  Right foot - plantar  - 10 % slough, 90% granulation  - no sign of infection  - local wound care - silver alginate for autolytic debridement  - Patient is for discharge on Friday and have a follow up appointment with Dr Hudson Girard  Orders:  -     Wound cleansing and dressings; Future           Subjective: This is a 78year old patient referred to our service for wound on the right foot - plantar  As per report, the patient is s/p R TMA on 2/22/2021 and have a follow up appointment with Hudson Kline in two weeks  The wound vac was placed yesterday and due for change tomorrow   Wound vac intact  The wound on the right foot plantar started prior to admission at [address] [facility],8[address]  Etiology : Diabetic ulcer, Severity : no sign of infection, Current treatment : silver alginate      Patient's care was coordinated with nursing facility staff  Recent vitals, labs and updated medications were reviewed on EMR or chart system of facility   Past Medical, surgical, social, medication and allergy history and patient's previous records were reviewed and updated as appropriate:     Patient Active Problem List   Diagnosis    Hyperlipidemia    Type 2 diabetes mellitus with diabetic neuropathy ([address] )    Atrial fibrillation with RVR ([address] )    Right middle lobe pulmonary nodule    Essential hypertension    Acquired absence of other right toe(s) ([address] )    Benign prostatic hyperplasia with lower urinary tract symptoms    Positive for microalbuminuria    Deep disruption or dehiscence of operation wound    Post-operative state    Aftercare for amputation stump    Ambulatory dysfunction    Diabetic ulcer of right 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, with fat layer exposed ([address] )     Past Medical History:   Diagnosis Date    Arthritis     Atrial fibrillation ([address] )     Diagnosed 11/2018    Benign prostate hyperplasia 04/2002    Cellulitis     right lower leg     Colon polyp 2006    Diabetes mellitus ([address] )     Hearing aid worn     bilat    Hearing loss     90% loss left ear and 40% right ear    History of clubfoot     "since birth"    History of pneumonia     History of TIA (transient ischemic attack) 04/25/2017 11/30/18 pt denies    Hyperlipidemia 5/15/2014    Hypertension     Infectious viral hepatitis     "cant remember type"    Irregular heart beat     Neuropathy     both feet    Osteomyelitis ([address] )     right great toe    Right middle lobe pulmonary nodule 11/6/2018    Seasickness     Teeth missing     Type 2 diabetes mellitus with diabetic neuropathy ([address] ) 11/6/2018    Wears glasses     reading    Wound, open     bottom of right foot     Past Surgical History:   Procedure Laterality Date    BUNIONECTOMY Right 12/4/2018    Procedure: 5TH METATARSAL BONE PARTIAL RESECTION, FULL THICKNESS DEBRIDEMENT OF DIABETIC ULCER;  Surgeon: Andrew Dias DPM;  [facility];  Service: Podiatry    CLUB FOOT RELEASE Bilateral     COLONOSCOPY      EXTERNAL FIXATOR APPLICATION Right 1/00/7329    Procedure: Application multiplane external fixation;  Surgeon: Néstor Palmer DPM;  [facility];  Service: Podiatry    FOOT HARDWARE REMOVAL Right 2/17/2021    Procedure: REMOVAL EXTERNAL FIXATOR;  Surgeon: Néstor Palmer DPM;  [facility];  Service: Podiatry    INCISION AND DRAINAGE OF WOUND Right 2/17/2021    Procedure: INCISION AND DRAINAGE (I&D) EXTREMITY;  Surgeon: Néstor Palmer DPM;  [facility];  Service: Podiatry    ORIF FOOT FRACTURE Right 3/4/2021    Procedure: VAC PLACEMENT; SCREW FIXATION RIGHT FOOT FUSION;  Surgeon: Néstor Palmer DPM;  [facility];  Service: Podiatry    [address] [place] [address] Right 2/22/2021    Procedure: AMPUTATION TRANSMETATARSAL (TMA), REMOVAL NAIL IM T2 ICF HARDWARE;  Surgeon: Néstor Palmer DPM;  [facility];  Service: Podiatry    NE FUSION FOOT BONES,MIDTARSAL,OSTEOTMY Right 2/12/2021    Procedure: foot ARTHRODESIS/FUSION;  Surgeon: Néstor Palmer DPM;  [facility];  Service: Podiatry    NE LENGTH/SHORT LEG/ANKL TENDON,SINGLE Right 2/12/2021    Procedure: LENGTHEN TIBIAL TENDON, TRANS HEEL CORD;  Surgeon: Néstor Palmer DPM;  [facility];  Service: Podiatry    NE PART [address] [facility] [address] BONE METATARSAL HEAD,EA Right 12/29/2020    Procedure: EXCISION EXOSTOSIS;  Surgeon: Néstor Palmer DPM;  [facility];  Service: Podiatry    NE SECD CLOS SURG WND EXTEN/COMPLIC Right 92/56/0036    Procedure: PRIMARY DELAYED CLOSURE;  Surgeon: Néstor Palmer DPM;  [facility]; Service: Podiatry    TOE AMPUTATION Right     partial great toe    VAC DRESSING APPLICATION Right 6105    Procedure: APPLICATION VAC DRESSING EXTREMITY;  Surgeon: Danyelle Lambert MD;  [facility];  Service: Plastics    [address] Right 3/1/2021    Procedure: DEBRIDEMENT LOWER EXTREMITY [facility] OUT);   Surgeon: Danyelle Lambert MD;  [facility];  Service: Plastics     Social History     Socioeconomic History    Marital status: /Civil Union     Spouse name: None    Number of children: None    Years of education: None    Highest education level: None   Occupational History    None   Social Needs    Financial resource strain: None    Food insecurity     Worry: None     Inability: None    Transportation needs     Medical: None     Non-medical: None   Tobacco Use    Smoking status: Former Smoker     Types: Cigarettes     Quit date: 1988     Years since quittin 3    Smokeless tobacco: Former User   Substance and Sexual Activity    Alcohol use: Yes     Frequency: 2-4 times a month     Drinks per session: 1 or 2     Comment: socially    Drug use: Not Currently    Sexual activity: Yes   Lifestyle    Physical activity     Days per week: None     Minutes per session: None    Stress: None   Relationships    Social connections     Talks on phone: None     Gets together: None     Attends Hinduism service: None     Active member of club or organization: None     Attends meetings of clubs or organizations: None     Relationship status: None    Intimate partner violence     Fear of current or ex partner: None     Emotionally abused: None     Physically abused: None     Forced sexual activity: None   Other Topics Concern    None   Social History Narrative    None        Current Outpatient Medications:     Acetaminophen (TYLENOL ARTHRITIS PAIN PO), Take 650 mg by mouth 2 (two) times a day, Disp: , Rfl:     atorvastatin (LIPITOR) 40 mg tablet, Take 1 tablet (40 mg total) by mouth daily, Disp: 90 tablet, Rfl: 3    Blood Glucose Monitoring Suppl (ACCU-CHEK JYOTI SMARTVIEW) w/Device KIT, use to check Blood Sugar as needed, Disp: , Rfl:     diltiazem (CARDIZEM) 120 MG tablet, Take 1 tablet (120 mg total) by mouth 2 (two) times a day, Disp: 180 tablet, Rfl: 6    doxycycline hyclate (VIBRAMYCIN) 100 mg capsule, Take 1 capsule (100 mg total) by mouth every 12 (twelve) hours, Disp: 72 capsule, Rfl: 0    Eliquis 5 MG, Take 1 tablet (5 mg total) by mouth 2 (two) times a day, Disp: 60 tablet, Rfl: 2    glimepiride (AMARYL) 4 mg tablet, Take one tablet by mouth twice daily (Patient taking differently: Take 4 mg by mouth 2 (two) times a day Take one tablet by mouth twice daily), Disp: 180 tablet, Rfl: 3    glucose blood (Accu-Chek SmartView) test strip, Use as instructed BID, Disp: 200 each, Rfl: 1    Januvia 100 MG tablet, Take 1 tablet (100 mg total) by mouth daily, Disp: 132 tablet, Rfl: 3    lisinopril (ZESTRIL) 40 mg tablet, Take 1 tablet (40 mg total) by mouth daily, Disp: 90 tablet, Rfl: 3    metFORMIN (GLUCOPHAGE-XR) 750 mg 24 hr tablet, Take 1 tablet (750 mg total) by mouth 2 (two) times a day ONE BID, Disp: 180 tablet, Rfl: 3    oxyCODONE-acetaminophen (PERCOCET) 5-325 mg per tablet, Take 1 tablet by mouth every 6 (six) hours as needed for moderate pain for up to 20 dosesMax Daily Amount: 4 tablets, Disp: 20 tablet, Rfl: 0  Family History   Problem Relation Age of Onset    Diabetes Father         mellitus        Review of Systems   Constitutional: Negative for appetite change  HENT: Negative for mouth sores, sore throat and trouble swallowing  Eyes: Negative for pain, discharge, itching and visual disturbance  Respiratory: Negative for cough, chest tightness and shortness of breath  Cardiovascular: Negative for chest pain and leg swelling     Gastrointestinal: Negative for abdominal distention, abdominal pain, constipation, diarrhea and nausea  Endocrine: Negative for polydipsia, polyphagia and polyuria  Genitourinary: Negative for dysuria and flank pain  Musculoskeletal: Positive for gait problem  Skin: Positive for wound  Allergic/Immunologic: Negative for environmental allergies  Neurological: Negative for dizziness, seizures and headaches  Psychiatric/Behavioral: Negative for behavioral problems and confusion  The patient is not nervous/anxious  Objective: There were no vitals taken for this visit  Physical Exam  Constitutional:       Appearance: Normal appearance  HENT:      Head: Normocephalic and atraumatic  Nose: Nose normal       Mouth/Throat:      Mouth: Mucous membranes are moist    Neck:      Musculoskeletal: Normal range of motion  Cardiovascular:      Rate and Rhythm: Normal rate  Pulses: Normal pulses  Pulmonary:      Effort: Pulmonary effort is normal    Musculoskeletal:      Right lower leg: Edema present  Skin:     General: Skin is dry  Findings: Lesion present  Comments: Location Right foot plantar wound bed - 0 7 x 2 5 x 0 1 cm , no undermining, no tunneling, 0 % epithelial, 90%granulation, 10%slough, exudate - scant amount of serosanguinous drainage, no malodor ( assess after dressing removal and cleansing), wound edge - attached to base, periwound - intact  No localized sign of infection, Denies pain     Neurological:      Mental Status: He is alert and oriented to person, place, and time  Gait: Gait abnormal    Psychiatric:         Mood and Affect: Mood normal          Behavior: Behavior normal          Thought Content: Thought content normal               Procedures     Results from last 6 Months   Lab Units 02/17/21  1636   WOUND CULTURE  3+ Growth of Enterobacter cloacae complex*  1+ Growth of          Coordination of Care: Wound team aware of the treatment plan      Follow up :  Return If patient still in the facility        KIRIT Olivera

## 2021-03-16 NOTE — PATIENT INSTRUCTIONS
Orders Placed This Encounter   Procedures    Wound cleansing and dressings     Location : Right foot plantar  Cleanse with NSS   Apply silver alginate to wound bed and cover with bordered gauze  Frequency : Three times a week and prn for soiling  Continue the wound vac order for the Right TMA  Follow up appointment with Dr Zeferino Quintana  Continue to offload  Monitor for any sign of infection  Inform patient's PCP in the facility of any worsening or infection       Standing Status:   Future     Standing Expiration Date:   3/16/2022

## 2021-03-16 NOTE — LETTER
Patient:  Mely     1950           KIRIT Cantu saw Mely   for a wound care visit on 3/16/2021  See below for information relating to this visit  Chief Complaint   Patient presents with   [address] [facility] Patient Visit     Right foot        Assessment/Plan:  1  Type 2 diabetes mellitus with diabetic neuropathy, unspecified whether long term insulin use ([facility] [address] )  Assessment & Plan:  - As per medical record review, patient FBS ranges from 89 - 200  - manage by Senior care    Orders:  -     Wound cleansing and dressings; Future    2  Diabetic ulcer of right 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, with fat layer exposed ([facility] [address] )  Assessment & Plan:  Right foot - plantar  - 10 % slough, 90% granulation  - no sign of infection  - local wound care - silver alginate for autolytic debridement  - Patient is for discharge on Friday and have a follow up appointment with Dr Arielle Burnett  Orders:  -     Wound cleansing and dressings; Future           Orders:  Mely    1950  Orders Placed This Encounter   Procedures    Wound cleansing and dressings     Location : Right foot plantar  Cleanse with NSS   Apply silver alginate to wound bed and cover with bordered gauze  Frequency : Three times a week and prn for soiling  Continue the wound vac order for the Right TMA  Follow up appointment with Dr Arielle Burnett  Continue to offload  Monitor for any sign of infection  Inform patient's PCP in the facility of any worsening or infection  Standing Status:   Future     Standing Expiration Date:   3/16/2022         Follow Up:  Return If patient still in the facility  [address] [facility] hours are 8:00 am - 4:30 pm Monday through Friday  The center phone number is [phone]  You can also contact me directly thru my email at [email or web address] or thru tiger text   If it is an emergency, please contact the PCP or patient's attending physician in your facility       Sincerely,    Electronically signed by KIRIT Robert    Patient : Godwin Oakley     1950

## 2021-03-17 ENCOUNTER — NURSING HOME VISIT (OUTPATIENT)
Dept: GERIATRICS | Facility: OTHER | Age: 71
End: 2021-03-17
Payer: COMMERCIAL

## 2021-03-17 VITALS
TEMPERATURE: 97.8 F | DIASTOLIC BLOOD PRESSURE: 76 MMHG | SYSTOLIC BLOOD PRESSURE: 124 MMHG | HEART RATE: 77 BPM | OXYGEN SATURATION: 96 % | RESPIRATION RATE: 18 BRPM

## 2021-03-17 DIAGNOSIS — E11.40 TYPE 2 DIABETES MELLITUS WITH DIABETIC NEUROPATHY, WITHOUT LONG-TERM CURRENT USE OF INSULIN (HCC): Primary | Chronic | ICD-10-CM

## 2021-03-17 DIAGNOSIS — I48.91 ATRIAL FIBRILLATION WITH RVR (HCC): ICD-10-CM

## 2021-03-17 DIAGNOSIS — I10 ESSENTIAL HYPERTENSION: Chronic | ICD-10-CM

## 2021-03-17 DIAGNOSIS — R26.2 AMBULATORY DYSFUNCTION: ICD-10-CM

## 2021-03-17 DIAGNOSIS — Z47.81 AFTERCARE FOR AMPUTATION STUMP: ICD-10-CM

## 2021-03-17 PROCEDURE — 99316 NF DSCHRG MGMT 30 MIN+: CPT | Performed by: NURSE PRACTITIONER

## 2021-03-17 NOTE — PROGRESS NOTES
[facility]  [address]  [phone]  [facility] List: [facility]   Code 31  Discharge Summary      NAME: Wilmer Escalona  AGE: 73 y o   SEX: male    DATE OF ENCOUNTER: 03/17/2021    Assessment and Plan     Problem List Items Addressed This Visit        Endocrine    Type 2 diabetes mellitus with diabetic neuropathy ([address] ) - Primary (Chronic)       Lab Results   Component Value Date    HGBA1C 7 9 (H) 03/16/2021     ·  fasting blood sugars over the last few days , bedtime blood sugars 133-177  ·  continue on diabetic diet  Upon discharge  ·  last A1c 7 9, metformin increased to 1000 mg b i d  from 750 mg b i d   ·  check blood sugars at home fasting and at bedtime, keep a log to take to PCP  ·  continue on other medications of glimepiride 4 mg b i d  and Januvia 100 mg daily  ·  encouraged dietary and lifestyle modification  ·  follow-up with PCP upon discharge for further medication management as needed         Relevant Medications    metFORMIN (GLUCOPHAGE) 1000 MG tablet       Cardiovascular and Mediastinum    Essential hypertension (Chronic)     ·  Blood pressure log reviewed and stable  ·  initial blood pressure this morning was elevated, but once rechecked manually was down to 124/76  ·  continue with current medication regimen of lisinopril 40 mg daily  And diltiazem  ·  continue with dietary and lifestyle modification  ·  continue to monitor closely         Atrial fibrillation with RVR (HCC)     ·   Asymptomatic,Denies chest pain, shortness of breath, and palpitations  ·  rate controlled  ·  continue on current medication regimen of diltiazem 120 mg b i d  and Eliquis for anticoagulation  ·  follow-up with cardiology as recommended upon discharge            Other    Aftercare for amputation stump     · S/p  Right TMA on 02/22/2021, with wound VAC placement on 02/23/2021  ·  wound care following,  Wound care NP to come change wound VAC dressing and give recommendations later in the week  ·  continue with wound care and wound VAC dressing changes per recommendations  ·  follow-up with Plastic surgery upon discharge as recommended  ·  follow-up appointment with wound care on 03/19/2021  ·  continue with NWB  To RLE  ·  afebrile  ·  per patient his pain is minimal, Tylenol PRN for pain control   ·  continue to monitor         Ambulatory dysfunction     ·  Currently  NWB to RLE  ·  wound VAC in place to right lower extremity  ·  continue with walker for ambulation and transfers  ·  able to hop on left foot to ambulate and get himself in  And out of bed  ·  fall precautions  ·  continuous strength and balance training until discharge home  ·  PT/OT to follow up with the patient upon discharge home               No orders of the defined types were placed in this encounter       - Counseling Documentation: patient was counseled regarding: diagnostic results, instructions for management, risks and benefits of treatment options and importance of compliance with treatment    Chief Complaint     No chief complaint on file  History of Present Illness     Lay Hale is a 73year old male  At [facility] for short-term rehab status post right TMA with wound VAC placement  He is being seen today for a follow-up and for his  planned discharge of 03/19/2021  He follows  with plastic surgery and wound care  Comorbidities include hypertension, atrial fibrillation, type 2 diabetes, and ambulatory dysfunction    While admitted patient worked with pt/ot, he now requires extensive assist x1 with walker, NWB to RLE  Uses walker for support and hops on L foot  Wound vac changed by nursing per wound care orders  Pain was well controlled  Fasting blood sugars range from 70 to 140s  Bedtime blood sugars ranged from the 130s to 300s  Metformin increased from 750 mg b i d  to 1000 mg b i d  for A1c of 7 9     Discussed with patient change in medication dosage and patient is agreeable  upon examination patient was sitting on the edge of the bed after working with physical therapy  He was icing his knee as he says his left knee was a little bit sore after working with therapy this morning  He is NWB to RLW and hops around on his left food  He states his knee is starting to feel better with the ice  He otherwise offers no complaints  He has a good appetite  Feels like he is ready to be discharged home  Pain is minimal besides  when changing wound VAC dressing  He denies headaches, dizziness, lightheadedness, chest pain, shortness of breath, and GI  upset  Per nursing no acute concerns at this time  The following portions of the patient's history were reviewed and updated as appropriate: allergies, current medications, past family history, past medical history, past social history, past surgical history and problem list     Review of Systems     Review of Systems   Constitutional: Negative for activity change, chills and fever  HENT: Negative for congestion, ear pain and sore throat  Eyes: Negative for pain and visual disturbance  Respiratory: Negative for cough and shortness of breath  Cardiovascular: Negative for chest pain and palpitations  Gastrointestinal: Negative for abdominal distention, abdominal pain, constipation, diarrhea and vomiting  Genitourinary: Negative for difficulty urinating and dysuria  Musculoskeletal: Positive for gait problem (Walker NWB to RLE)  Negative for arthralgias and back pain  Skin: Positive for wound (R foot )  Negative for color change  Neurological: Positive for weakness (s/p R TMA with wound vac placement, NWB to RLE)  Negative for dizziness, seizures, syncope and headaches  All other systems reviewed and are negative        Active Problem List     Patient Active Problem List   Diagnosis    Hyperlipidemia    Type 2 diabetes mellitus with diabetic neuropathy ([facility] )    Atrial fibrillation with RVR ([facility] )    Right middle lobe pulmonary nodule    Essential hypertension    Acquired absence of other right toe(s) (HCC)    Benign prostatic hyperplasia with lower urinary tract symptoms    Positive for microalbuminuria    Deep disruption or dehiscence of operation wound    Post-operative state    Aftercare for amputation stump    Ambulatory dysfunction    Diabetic ulcer of right 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, with fat layer exposed (HCC)       Objective     /76   Pulse 77   Temp 97 8 °F (36 6 °C)   Resp 18   SpO2 96%     Physical Exam  Vitals signs and nursing note reviewed  Constitutional:       General: He is not in acute distress  Appearance: Normal appearance  He is not ill-appearing  HENT:      Head: Normocephalic  Cardiovascular:      Rate and Rhythm: Normal rate  Rhythm irregularly irregular  Pulses: Normal pulses  Heart sounds: Normal heart sounds  No murmur  Pulmonary:      Effort: Pulmonary effort is normal  No respiratory distress  Breath sounds: Normal breath sounds  No wheezing  Abdominal:      General: Bowel sounds are normal  There is no distension  Palpations: Abdomen is soft  Tenderness: There is no abdominal tenderness  Musculoskeletal:      Right lower leg: Edema (non pitting) present  Left lower leg: No edema  Skin:     General: Skin is warm and dry  Coloration: Skin is not jaundiced or pale  Findings: Wound (RLE with wound vac, slight redness around wound vac site, unchanged from previous ) present  Neurological:      General: No focal deficit present  Mental Status: He is alert and oriented to person, place, and time  Mental status is at baseline  Motor: Weakness (wound vac in place to RLE) present  Gait: Gait abnormal (NWB to RLE)     Psychiatric:         Mood and Affect: Mood normal          Behavior: Behavior normal          Pertinent Laboratory/Diagnostic Studies:  Labs reviewed in facility paper chart  Current Medications   Medications were reviewed and updated  Please refer to paper chart for updated list of medications  Current Outpatient Medications:     metFORMIN (GLUCOPHAGE) 1000 MG tablet, Take 1,000 mg by mouth 2 (two) times a day with meals, Disp: , Rfl:     Acetaminophen (TYLENOL ARTHRITIS PAIN PO), Take 650 mg by mouth 2 (two) times a day, Disp: , Rfl:     atorvastatin (LIPITOR) 40 mg tablet, Take 1 tablet (40 mg total) by mouth daily, Disp: 90 tablet, Rfl: 3    Blood Glucose Monitoring Suppl (ACCU-CHEK JYOTI SMARTVIEW) w/Device KIT, use to check Blood Sugar as needed, Disp: , Rfl:     diltiazem (CARDIZEM) 120 MG tablet, Take 1 tablet (120 mg total) by mouth 2 (two) times a day, Disp: 180 tablet, Rfl: 6    doxycycline hyclate (VIBRAMYCIN) 100 mg capsule, Take 1 capsule (100 mg total) by mouth every 12 (twelve) hours, Disp: 72 capsule, Rfl: 0    Eliquis 5 MG, Take 1 tablet (5 mg total) by mouth 2 (two) times a day, Disp: 60 tablet, Rfl: 2    glimepiride (AMARYL) 4 mg tablet, Take one tablet by mouth twice daily (Patient taking differently: Take 4 mg by mouth 2 (two) times a day Take one tablet by mouth twice daily), Disp: 180 tablet, Rfl: 3    glucose blood (Accu-Chek SmartView) test strip, Use as instructed BID, Disp: 200 each, Rfl: 1    Januvia 100 MG tablet, Take 1 tablet (100 mg total) by mouth daily, Disp: 132 tablet, Rfl: 3    lisinopril (ZESTRIL) 40 mg tablet, Take 1 tablet (40 mg total) by mouth daily, Disp: 90 tablet, Rfl: 3     more than 30 minutes spent on  Coordination of care,direct patient care, chart review, patient education, and patient examination       Doug Dickson  3/18/2021 11:23 AM

## 2021-03-18 NOTE — ASSESSMENT & PLAN NOTE
·   Asymptomatic,Denies chest pain, shortness of breath, and palpitations  ·  rate controlled  ·  continue on current medication regimen of diltiazem 120 mg b i d  and Eliquis for anticoagulation  ·  follow-up with cardiology as recommended upon discharge

## 2021-03-18 NOTE — ASSESSMENT & PLAN NOTE
· S/p  Right TMA on 02/22/2021, with wound VAC placement on 02/23/2021  ·  wound care following,  Wound care NP to come change wound VAC dressing and give recommendations later in the week  ·  continue with wound care and wound VAC dressing changes per recommendations  ·  follow-up with Plastic surgery upon discharge as recommended  ·  follow-up appointment with wound care on 03/19/2021  ·  continue with NWB  To RLE  ·  afebrile  ·  per patient his pain is minimal, Tylenol PRN for pain control   ·  continue to monitor

## 2021-03-18 NOTE — ASSESSMENT & PLAN NOTE
·  Currently  NWB to RLE  ·  wound VAC in place to right lower extremity  ·  continue with walker for ambulation and transfers  ·  able to hop on left foot to ambulate and get himself in  And out of bed  ·  fall precautions  ·  continuous strength and balance training until discharge home  ·  PT/OT to follow up with the patient upon discharge home

## 2021-03-18 NOTE — ASSESSMENT & PLAN NOTE
·  Blood pressure log reviewed and stable  ·  initial blood pressure this morning was elevated, but once rechecked manually was down to 124/76  ·  continue with current medication regimen of lisinopril 40 mg daily  And diltiazem  ·  continue with dietary and lifestyle modification  ·  continue to monitor closely

## 2021-03-18 NOTE — ASSESSMENT & PLAN NOTE
Lab Results   Component Value Date    HGBA1C 7 9 (H) 03/16/2021     ·  fasting blood sugars over the last few days , bedtime blood sugars 133-177  ·  continue on diabetic diet  Upon discharge  ·  last A1c 7 9, metformin increased to 1000 mg b i d  from 750 mg b i d   ·  check blood sugars at home fasting and at bedtime, keep a log to take to PCP  ·  continue on other medications of glimepiride 4 mg b i d  and Januvia 100 mg daily  ·  encouraged dietary and lifestyle modification  ·  follow-up with PCP upon discharge for further medication management as needed

## 2021-03-19 ENCOUNTER — OFFICE VISIT (OUTPATIENT)
Dept: WOUND CARE | Facility: HOSPITAL | Age: 71
End: 2021-03-19
Payer: COMMERCIAL

## 2021-03-19 ENCOUNTER — TELEPHONE (OUTPATIENT)
Dept: WOUND CARE | Facility: HOSPITAL | Age: 71
End: 2021-03-19

## 2021-03-19 VITALS
TEMPERATURE: 98.3 F | RESPIRATION RATE: 16 BRPM | HEART RATE: 64 BPM | SYSTOLIC BLOOD PRESSURE: 150 MMHG | DIASTOLIC BLOOD PRESSURE: 64 MMHG

## 2021-03-19 DIAGNOSIS — Z89.431 S/P TRANSMETATARSAL AMPUTATION OF FOOT, RIGHT (HCC): ICD-10-CM

## 2021-03-19 DIAGNOSIS — T81.32XA DEEP DISRUPTION OR DEHISCENCE OF OPERATION WOUND, INITIAL ENCOUNTER: Primary | ICD-10-CM

## 2021-03-19 DIAGNOSIS — L97.412 DIABETIC ULCER OF RIGHT MIDFOOT ASSOCIATED WITH TYPE 2 DIABETES MELLITUS, WITH FAT LAYER EXPOSED (HCC): ICD-10-CM

## 2021-03-19 DIAGNOSIS — E11.621 DIABETIC ULCER OF RIGHT MIDFOOT ASSOCIATED WITH TYPE 2 DIABETES MELLITUS, WITH FAT LAYER EXPOSED (HCC): ICD-10-CM

## 2021-03-19 DIAGNOSIS — E11.40 TYPE 2 DIABETES MELLITUS WITH DIABETIC NEUROPATHY, UNSPECIFIED WHETHER LONG TERM INSULIN USE (HCC): ICD-10-CM

## 2021-03-19 DIAGNOSIS — Q66.01 CONGENITAL TALIPES EQUINOVARUS, RIGHT FOOT: ICD-10-CM

## 2021-03-19 PROCEDURE — 99214 OFFICE O/P EST MOD 30 MIN: CPT | Performed by: PODIATRIST

## 2021-03-19 PROCEDURE — 11046 DBRDMT MUSC&/FSCA EA ADDL: CPT | Performed by: PODIATRIST

## 2021-03-19 PROCEDURE — G0463 HOSPITAL OUTPT CLINIC VISIT: HCPCS | Performed by: PODIATRIST

## 2021-03-19 PROCEDURE — 11043 DBRDMT MUSC&/FSCA 1ST 20/<: CPT | Performed by: PODIATRIST

## 2021-03-19 RX ORDER — LIDOCAINE HYDROCHLORIDE 40 MG/ML
5 SOLUTION TOPICAL ONCE
Status: COMPLETED | OUTPATIENT
Start: 2021-03-19 | End: 2021-03-19

## 2021-03-19 RX ADMIN — LIDOCAINE HYDROCHLORIDE 5 ML: 40 SOLUTION TOPICAL at 09:38

## 2021-03-19 NOTE — LETTER
[address] [address]  Phone#  [phone]  Fax#  [phone]    Patient:  Donna Diallo  YOB: 1950  Phone:  [phone]  Date of Visit:  3/19/2021    Orders Placed This Encounter   Procedures    Debridement     This order was created via procedure documentation    Wound cleansing and dressings     Wash your hands with soap and water  Remove old dressing, discard into plastic bag and place in trash  Cleanse the wound with normal saline prior to applying a clean dressing  Do not use tissue or cotton balls  Do not scrub the wound  Pat dry using gauze  Shower no  DO NOT WET DRESSING  Apply Acticoat 7 to the wound  Cover with ABD pad  Secure with (bulky dressing) 2 cast rolls, kerlix, and coban  Today's treatment done per above  Apply wound vac as soon as possible  Wound Vac Settings:    Skin prep periwound prior to applying drape  Pressure:  125  Suction :  Continuous  Sponge Color:  Black    Change dressing 3x a week  Apply black foam to all 3 wounds, bridge appropriately  DO NOT USE WHITE FOAM  DO NOT USE ADAPTIC  NO WEIGHT BEARING TO RIGHT FOOT  Follow up in 2 weeks with Dr Roberto Vang       Standing Status:   Future     Standing Expiration Date:   3/19/2022    Debridement     This order was created via procedure documentation         Electronically signed by Joann Moreno DPM

## 2021-03-19 NOTE — PROGRESS NOTES
Patient ID: Ricardo Gaffney  is a 73 y o  male Date of Birth 1950     My role is Foot, Ankle and Wound Specialist    Chief Complaint   Patient presents with   [facility]     Right foot wounds        Kitty Alicea is here s/p TMA  Subjective:   Kitty Alicea is now home having discharged from [facility]  He was on [facility] while inpatient and at [facility]  He's pending placement of home VAC  [facility]) hasn't come in yet  He is NWB on the foot now he states  His free flap is scheduled for 4/7        The following portions of the patient's history were reviewed and updated as appropriate:   Patient Active Problem List   Diagnosis    Hyperlipidemia    Type 2 diabetes mellitus with diabetic neuropathy ([address] )    Atrial fibrillation with RVR ([address] )    Right middle lobe pulmonary nodule    Essential hypertension    Acquired absence of other right toe(s) ([address] )    Benign prostatic hyperplasia with lower urinary tract symptoms    Positive for microalbuminuria    Deep disruption or dehiscence of operation wound    Post-operative state    Aftercare for amputation stump    Ambulatory dysfunction    Diabetic ulcer of right 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, with fat layer exposed ([address] )    S/P transmetatarsal amputation of foot, right ([address] )     Past Medical History:   Diagnosis Date    Arthritis     Atrial fibrillation ([address] )     Diagnosed 11/2018    Benign prostate hyperplasia 04/2002    Cellulitis     right lower leg     Colon polyp 2006    Diabetes mellitus ([address] )     Hearing aid worn     bilat    Hearing loss     90% loss left ear and 40% right ear    History of clubfoot     "since birth"    History of pneumonia     History of TIA (transient ischemic attack) 04/25/2017 11/30/18 pt denies    Hyperlipidemia 5/15/2014    Hypertension     Infectious viral hepatitis     "cant remember type"    Irregular heart beat     Neuropathy     both feet    Osteomyelitis ([address] )     right great toe    Right middle lobe pulmonary nodule 11/6/2018    Seasickness     Teeth missing     Type 2 diabetes mellitus with diabetic neuropathy ([facility] ) 11/6/2018    Wears glasses     reading    Wound, open     bottom of right foot     Past Surgical History:   Procedure Laterality Date    BUNIONECTOMY Right 12/4/2018    Procedure: 5TH METATARSAL BONE PARTIAL RESECTION, FULL THICKNESS DEBRIDEMENT OF DIABETIC ULCER;  Surgeon: Andrew Dias DPM;  [facility];  Service: Podiatry    CLUB FOOT RELEASE Bilateral     COLONOSCOPY      EXTERNAL FIXATOR APPLICATION Right 0/05/1084    Procedure: Application multiplane external fixation;  Surgeon: Néstor Palmer DPM;  [facility];  Service: Podiatry    FOOT HARDWARE REMOVAL Right 2/17/2021    Procedure: REMOVAL EXTERNAL FIXATOR;  Surgeon: Néstor Palmer DPM;  [facility];  Service: Podiatry    INCISION AND DRAINAGE OF WOUND Right 2/17/2021    Procedure: INCISION AND DRAINAGE (I&D) EXTREMITY;  Surgeon: Néstor Palmer DPM;  [facility];  Service: Podiatry    ORIF FOOT FRACTURE Right 3/4/2021    Procedure: VAC PLACEMENT; SCREW FIXATION RIGHT FOOT FUSION;  Surgeon: Néstor Palmer DPM;  [facility];  Service: Podiatry    [address] [place] [address] Right 2/22/2021    Procedure: AMPUTATION TRANSMETATARSAL (TMA), REMOVAL NAIL IM T2 ICF HARDWARE;  Surgeon: Néstor Palmer DPM;  [facility];  Service: Podiatry    ND FUSION FOOT BONES,MIDTARSAL,OSTEOTMY Right 2/12/2021    Procedure: foot ARTHRODESIS/FUSION;  Surgeon: Néstor Palmer DPM;  [facility];  Service: Podiatry    ND LENGTH/SHORT LEG/ANKL TENDON,SINGLE Right 2/12/2021    Procedure: LENGTHEN TIBIAL TENDON, TRANS HEEL CORD;  Surgeon: Néstor Palmer DPM;  [facility];  Service: Podiatry    ND PART [address] BONE METATARSAL HEAD,EA Right 12/29/2020    Procedure: EXCISION EXOSTOSIS;  Surgeon: Néstor Palmer DPM;  [facility];  Service: Podiatry    ND SECD CLOS SURG WND EXTEN/COMPLIC Right     Procedure: PRIMARY DELAYED CLOSURE;  Surgeon: Ruchi Momin DPM;  [facility];  Service: Podiatry    TOE AMPUTATION Right     partial great toe    VAC DRESSING APPLICATION Right     Procedure: APPLICATION VAC DRESSING EXTREMITY;  Surgeon: Pepe Hopkins MD;  [facility];  Service: Plastics    [address] [facility] Right 3/1/2021    Procedure: DEBRIDEMENT LOWER EXTREMITY ([address] OUT);   Surgeon: Pepe Hopkins MD;  [facility];  Service: Plastics     Social History     Socioeconomic History    Marital status: /Civil Union     Spouse name: None    Number of children: None    Years of education: None    Highest education level: None   Occupational History    None   Social Needs    Financial resource strain: None    Food insecurity     Worry: None     Inability: None    Transportation needs     Medical: None     Non-medical: None   Tobacco Use    Smoking status: Former Smoker     Types: Cigarettes     Quit date: 1988     Years since quittin 3    Smokeless tobacco: Former User   Substance and Sexual Activity    Alcohol use: Yes     Frequency: 2-4 times a month     Drinks per session: 1 or 2     Comment: socially    Drug use: Not Currently    Sexual activity: Yes   Lifestyle    Physical activity     Days per week: None     Minutes per session: None    Stress: None   Relationships    Social connections     Talks on phone: None     Gets together: None     Attends Druze service: None     Active member of club or organization: None     Attends meetings of clubs or organizations: None     Relationship status: None    Intimate partner violence     Fear of current or ex partner: None     Emotionally abused: None     Physically abused: None     Forced sexual activity: None   Other Topics Concern    None   Social History Narrative    None        Current Outpatient Medications:     Acetaminophen (TYLENOL ARTHRITIS PAIN PO), Take 650 mg by mouth 2 (two) times a day, Disp: , Rfl:     atorvastatin (LIPITOR) 40 mg tablet, Take 1 tablet (40 mg total) by mouth daily, Disp: 90 tablet, Rfl: 3    Blood Glucose Monitoring Suppl (ACCU-CHEK JYOTI SMARTVIEW) w/Device KIT, use to check Blood Sugar as needed, Disp: , Rfl:     diltiazem (CARDIZEM) 120 MG tablet, Take 1 tablet (120 mg total) by mouth 2 (two) times a day, Disp: 180 tablet, Rfl: 6    doxycycline hyclate (VIBRAMYCIN) 100 mg capsule, Take 1 capsule (100 mg total) by mouth every 12 (twelve) hours, Disp: 72 capsule, Rfl: 0    Eliquis 5 MG, Take 1 tablet (5 mg total) by mouth 2 (two) times a day, Disp: 60 tablet, Rfl: 2    glimepiride (AMARYL) 4 mg tablet, Take one tablet by mouth twice daily (Patient taking differently: Take 4 mg by mouth 2 (two) times a day Take one tablet by mouth twice daily), Disp: 180 tablet, Rfl: 3    glucose blood (Accu-Chek SmartView) test strip, Use as instructed BID, Disp: 200 each, Rfl: 1    Januvia 100 MG tablet, Take 1 tablet (100 mg total) by mouth daily, Disp: 132 tablet, Rfl: 3    lisinopril (ZESTRIL) 40 mg tablet, Take 1 tablet (40 mg total) by mouth daily, Disp: 90 tablet, Rfl: 3    metFORMIN (GLUCOPHAGE) 1000 MG tablet, Take 1 tablet (1,000 mg total) by mouth 2 (two) times a day with meals, Disp: 28 tablet, Rfl: 0    Current Facility-Administered Medications:     lidocaine (XYLOCAINE) 4 % topical solution 5 mL, 5 mL, Topical, Once, Fox Cool DPM  Family History   Problem Relation Age of Onset    Diabetes Father         mellitus      Review of Systems   Constitutional: Positive for activity change  Negative for appetite change, chills, diaphoresis, fatigue, fever and unexpected weight change  Skin: Positive for wound         Allergies  Simvastatin and Itraconazole    Objective:  /64   Pulse 64   Temp 98 3 °F (36 8 °C)   Resp 16     Physical Exam  Vitals signs and nursing note reviewed  Constitutional:       General: He is not in acute distress  Appearance: Normal appearance  He is not ill-appearing, toxic-appearing or diaphoretic  Cardiovascular:      Pulses: Normal pulses  Heart sounds: Normal heart sounds  Pulmonary:      Effort: Pulmonary effort is normal       Breath sounds: Normal breath sounds  Musculoskeletal:      Comments: TMA Right foot   Skin:     Comments: See Wound Assessment  Neurological:      Mental Status: He is alert  Wound 06/04/20 Foot Right;Lateral (Active)       Wound 02/12/21 Foot Right (Active)       Wound 03/19/21 Surgical Foot Right;Medial (Active)   Wound Image   03/19/21 0924   Wound Description Beefy red;Granulation tissue;Slough; Yellow;Probes to bone 03/19/21 0824   Lavern-wound Assessment Clean;Dry; Intact 03/19/21 0824   Wound Length (cm) 5 4 cm 03/19/21 0824   Wound Width (cm) 5 5 cm 03/19/21 0824   Wound Depth (cm) 1 4 cm 03/19/21 0824   Wound Surface Area (cm^2) 29 7 cm^2 03/19/21 0824   Wound Volume (cm^3) 41 58 cm^3 03/19/21 0824   Calculated Wound Volume (cm^3) 41 58 cm^3 03/19/21 0824   Drainage Amount Moderate 03/19/21 0824   Drainage Description Serosanguineous 03/19/21 0824   Non-staged Wound Description Full thickness 03/19/21 0824   Dressing Changed Changed 03/19/21 0824   Patient Tolerance Tolerated well 03/19/21 0824   Dressing Status New drainage; Old drainage;Removed 03/19/21 0824       Wound 03/19/21 Right;Lateral (Active)   Wound Image   03/19/21 0924   Wound Description Beefy red;Granulation tissue; Yellow;Slough 03/19/21 0823   Lavern-wound Assessment Clean;Dry; Intact 03/19/21 0823   Wound Length (cm) 4 5 cm 03/19/21 0823   Wound Width (cm) 8 5 cm 03/19/21 0823   Wound Depth (cm) 1 cm 03/19/21 0823   Wound Surface Area (cm^2) 38 25 cm^2 03/19/21 0823   Wound Volume (cm^3) 38 25 cm^3 03/19/21 0823   Calculated Wound Volume (cm^3) 38 25 cm^3 03/19/21 0823   Drainage Amount Moderate 03/19/21 0823 Drainage Description Serosanguineous 03/19/21 0823   Non-staged Wound Description Full thickness 03/19/21 0823       Wound 03/19/21 Foot Right;Posterior (Active)   Wound Image   03/19/21 0925   Wound Description Eschar;Slough; Yellow 03/19/21 0831   Lavern-wound Assessment Dry; Intact; Clean 03/19/21 0831   Wound Length (cm) 1 cm 03/19/21 0831   Wound Width (cm) 2 9 cm 03/19/21 0831   Wound Depth (cm) 0 cm 03/19/21 0831   Wound Surface Area (cm^2) 2 9 cm^2 03/19/21 0831   Wound Volume (cm^3) 0 cm^3 03/19/21 0831   Calculated Wound Volume (cm^3) 0 cm^3 03/19/21 0831   Drainage Amount Small 03/19/21 0831   Drainage Description Serous 03/19/21 0831   Non-staged Wound Description Full thickness 03/19/21 0831           Wound 06/04/20 Foot Right;Lateral (Active)       Wound 02/12/21 Foot Right (Active)       Wound 03/19/21 Surgical Foot Right;Medial (Active)   Wound Image   03/19/21 0924   Wound Description Beefy red;Granulation tissue;Slough; Yellow;Probes to bone 03/19/21 0824   Lavern-wound Assessment Clean;Dry; Intact 03/19/21 0824   Wound Length (cm) 5 4 cm 03/19/21 0824   Wound Width (cm) 5 5 cm 03/19/21 0824   Wound Depth (cm) 1 4 cm 03/19/21 0824   Wound Surface Area (cm^2) 29 7 cm^2 03/19/21 0824   Wound Volume (cm^3) 41 58 cm^3 03/19/21 0824   Calculated Wound Volume (cm^3) 41 58 cm^3 03/19/21 0824   Drainage Amount Moderate 03/19/21 0824   Drainage Description Serosanguineous 03/19/21 0824   Non-staged Wound Description Full thickness 03/19/21 0824   Dressing Changed Changed 03/19/21 0824   Patient Tolerance Tolerated well 03/19/21 0824   Dressing Status New drainage; Old drainage;Removed 03/19/21 0824       Wound 03/19/21 Right;Lateral (Active)   Wound Image   03/19/21 0924   Wound Description Beefy red;Granulation tissue; Yellow;Slough 03/19/21 0823   Lavern-wound Assessment Clean;Dry; Intact 03/19/21 0823   Wound Length (cm) 4 5 cm 03/19/21 0823   Wound Width (cm) 8 5 cm 03/19/21 0823   Wound Depth (cm) 1 cm 03/19/21 9316   Wound Surface Area (cm^2) 38 25 cm^2 03/19/21 0823   Wound Volume (cm^3) 38 25 cm^3 03/19/21 0823   Calculated Wound Volume (cm^3) 38 25 cm^3 03/19/21 0823   Drainage Amount Moderate 03/19/21 0823   Drainage Description Serosanguineous 03/19/21 0823   Non-staged Wound Description Full thickness 03/19/21 0823       Wound 03/19/21 Foot Right;Posterior (Active)   Wound Image   03/19/21 0925   Wound Description Eschar;Slough; Yellow 03/19/21 0831   Lavern-wound Assessment Dry; Intact; Clean 03/19/21 0831   Wound Length (cm) 1 cm 03/19/21 0831   Wound Width (cm) 2 9 cm 03/19/21 0831   Wound Depth (cm) 0 cm 03/19/21 0831   Wound Surface Area (cm^2) 2 9 cm^2 03/19/21 0831   Wound Volume (cm^3) 0 cm^3 03/19/21 0831   Calculated Wound Volume (cm^3) 0 cm^3 03/19/21 0831   Drainage Amount Small 03/19/21 0831   Drainage Description Serous 03/19/21 0831   Non-staged Wound Description Full thickness 03/19/21 0831                         Diagnosis:  1  Type 2 diabetes mellitus with diabetic neuropathy, unspecified whether long term insulin use ([place] [address] )    2  Diabetic ulcer of right 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, with fat layer exposed ([facility] )  -     lidocaine (XYLOCAINE) 4 % topical solution 5 mL  -     Wound cleansing and dressings; Future    3  Congenital talipes equinovarus, right foot    4  Deep disruption or dehiscence of operation wound, initial encounter  -     lidocaine (XYLOCAINE) 4 % topical solution 5 mL  -     Wound cleansing and dressings; Future    5  S/P transmetatarsal amputation of foot, right ([facility] )        Diagnosis ICD-10-CM Associated Orders   1  Type 2 diabetes mellitus with diabetic neuropathy, unspecified whether long term insulin use ([facility])  E11 40    2  Diabetic ulcer of right 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, with fat layer exposed ([facility] )  E11 621 lidocaine (XYLOCAINE) 4 % topical solution 5 mL    L97 412 Wound cleansing and dressings   3   Congenital talipes equinovarus, right foot  Q66 01    4  Deep disruption or dehiscence of operation wound, initial encounter  T81  32XA lidocaine (XYLOCAINE) 4 % topical solution 5 mL     Wound cleansing and dressings   5  S/P transmetatarsal amputation of foot, right ([facility] )  Z31 871         ASSESSMENT    Problems:    Chronic illness / Problem not at goal with exacerbation, progression or side effects of treatment  1  Open TMA Right foot        PLAN    Discussion of Management /  Recommendations  I recommend strict NWB on the Right foot  The plantar flap is healed  Sutures removed today  I recommended VAC be re-instituted ASAP by BLAKE (granufoam, 125mmHg, no adaptic interface, no use of white foam) to the medial, lateral and small plantar wounds  We will optimize the wound before the flap date with debridements, NWB, VAC  I recommend sharp debridement today of the three wounds    Procedures Performed    Debridement   Wound 03/19/21 Surgical Foot Right;Medial    Universal Protocol:  Consent: Verbal consent obtained  Risks and benefits: risks, benefits and alternatives were discussed  Consent given by: patient  Time out: Immediately prior to procedure a "time out" was called to verify the correct patient, procedure, equipment, support staff and site/side marked as required    Timeout called at: 3/19/2021 9:37 AM   Patient understanding: patient states understanding of the procedure being performed  Patient identity confirmed: verbally with patient      Performed by: physician  Debridement type: surgical  Level of debridement: muscle  Pain control: lidocaine 4%  Pre-debridement measurements  Length (cm): 5 4  Width (cm): 5 5  Depth (cm): 1 4  Surface Area (cm^2): 29 7  Volume (cm^3): 41 58    Post-debridement measurements  Length (cm): 5 4  Width (cm): 5 5  Depth (cm): 1 5  Percent debrided: 80%  Surface Area (cm^2): 29 7  Area debrided (cm^2): 23 76  Volume (cm^3): 44 55  Tissue and other material debrided: adipose, fascia and subcutaneous tissue  Devitalized tissue debrided: necrotic debris and slough  Instrument(s) utilized: blade, rongeur and forceps  Bleeding: medium  Hemostasis obtained with: pressure  Procedural pain (0-10): insensate  Post-procedural pain: insensate   Response to treatment: procedure was tolerated well    Debridement   Wound 03/19/21 Right;Lateral    Universal Protocol:  Consent: Verbal consent obtained  Risks and benefits: risks, benefits and alternatives were discussed  Time out: Immediately prior to procedure a "time out" was called to verify the correct patient, procedure, equipment, support staff and site/side marked as required    Timeout called at: 3/19/2021 10:26 AM   Patient understanding: patient states understanding of the procedure being performed  Patient identity confirmed: verbally with patient      Performed by: physician  Debridement type: surgical  Level of debridement: muscle  Pain control: lidocaine 4%  Pre-debridement measurements  Length (cm): 4 5  Width (cm): 8 5  Depth (cm): 1  Surface Area (cm^2): 38 25  Volume (cm^3): 38 25    Post-debridement measurements  Length (cm): 4 5  Width (cm): 8 5  Depth (cm): 1  Percent debrided: 100%  Surface Area (cm^2): 38 25  Area debrided (cm^2): 38 25  Volume (cm^3): 38 25  Tissue and other material debrided: adipose and fascia  Devitalized tissue debrided: necrotic debris  Instrument(s) utilized: blade, forceps and rongeur  Bleeding: medium  Hemostasis obtained with: pressure  Procedural pain (0-10): insensate  Post-procedural pain: insensate   Response to treatment: procedure was tolerated well

## 2021-03-22 ENCOUNTER — TELEPHONE (OUTPATIENT)
Dept: FAMILY MEDICINE CLINIC | Facility: CLINIC | Age: 71
End: 2021-03-22

## 2021-03-22 NOTE — TELEPHONE ENCOUNTER
Sendy from [facility] [address] called to let you know patient started care with them 3/21/21 for pt, ot and nursing  Patient has TCM with you on 04/01/21  Before patient left [place] they wanted to increased metformin to 1,000 mg, they never increased the medication  Sendy wants you to know patient does not want medication to be increased  She states patient daily sugars have been around the 60  Patient is currently on 750 mg   FYI

## 2021-03-23 ENCOUNTER — TELEPHONE (OUTPATIENT)
Dept: UROLOGY | Facility: AMBULATORY SURGERY CENTER | Age: 71
End: 2021-03-23

## 2021-03-23 ENCOUNTER — ANESTHESIA EVENT (OUTPATIENT)
Dept: PERIOP | Facility: HOSPITAL | Age: 71
DRG: 463 | End: 2021-03-23
Payer: COMMERCIAL

## 2021-03-23 NOTE — TELEPHONE ENCOUNTER
Patient of Dr Jacy oHneycutt had OV scheduled 3/26 Due to current health issues Patient cancelling-- he will call back at a later time to r/s

## 2021-03-25 ENCOUNTER — OFFICE VISIT (OUTPATIENT)
Dept: PLASTIC SURGERY | Facility: CLINIC | Age: 71
End: 2021-03-25
Payer: COMMERCIAL

## 2021-03-25 VITALS
HEIGHT: 77 IN | WEIGHT: 234 LBS | BODY MASS INDEX: 27.63 KG/M2 | DIASTOLIC BLOOD PRESSURE: 95 MMHG | TEMPERATURE: 97.7 F | SYSTOLIC BLOOD PRESSURE: 146 MMHG | HEART RATE: 96 BPM

## 2021-03-25 DIAGNOSIS — E11.621 DIABETIC ULCER OF RIGHT MIDFOOT ASSOCIATED WITH TYPE 2 DIABETES MELLITUS, WITH FAT LAYER EXPOSED (HCC): Primary | ICD-10-CM

## 2021-03-25 DIAGNOSIS — L97.412 DIABETIC ULCER OF RIGHT MIDFOOT ASSOCIATED WITH TYPE 2 DIABETES MELLITUS, WITH FAT LAYER EXPOSED (HCC): Primary | ICD-10-CM

## 2021-03-25 DIAGNOSIS — Z89.431 S/P TRANSMETATARSAL AMPUTATION OF FOOT, RIGHT (HCC): ICD-10-CM

## 2021-03-25 PROCEDURE — 99213 OFFICE O/P EST LOW 20 MIN: CPT | Performed by: PLASTIC SURGERY

## 2021-03-25 PROCEDURE — 1036F TOBACCO NON-USER: CPT | Performed by: PLASTIC SURGERY

## 2021-03-25 PROCEDURE — 1160F RVW MEDS BY RX/DR IN RCRD: CPT | Performed by: PLASTIC SURGERY

## 2021-03-25 RX ORDER — METFORMIN HYDROCHLORIDE 750 MG/1
750 TABLET, EXTENDED RELEASE ORAL
COMMUNITY
Start: 2021-03-17 | End: 2021-03-30

## 2021-03-25 NOTE — H&P (VIEW-ONLY)
Assessment/Plan   Diagnoses and all orders for this visit:    Diabetic ulcer of right 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, with fat layer exposed ([facility] )    S/P transmetatarsal amputation of foot, right ([facility] )    Other orders  -     metFORMIN (GLUCOPHAGE-XR) 750 mg 24 hr tablet            During today's  30 minute visit, all of which was dedicated to counseling, I reviewed the anticipated preoperative, intraoperative, and postoperative courses  I informed him that the nurses will contact the patient the day prior to surgery in order to discuss orientation and logistical information  I will then see him the day of surgery where I will answer any last minute questions and perform my preoperative markings  We will then proceed to the operating room for an anticipated 8 hour procedure under general anesthesia  For each procedure, I reviewed the incisions/scars, duration, recovery time, postoperative restrictions, and follow-up  When applicable, I discussed hospitalization, dressing changes, drains, and donor site morbidity  All risks, benefits, and options, including but not limited to, pain, scarring, bleeding, infection, asymmetry, contour deformity, damage to adjacent nerves, vessels, and organs, hematoma, seroma, paresthesias, anesthesia (temporary versus permanent), skin necrosis, fat necrosis, flap necrosis, wound dehiscence with need for healing by secondary intention and postoperative dressing changes, hypertrophic and/or keloid scar formation, deep venous thrombosis, pulmonary embolism, recurrence, and need for revision and/or reoperation were fully explained to the patient  All questions were answered  Once full understanding of the anticipated procedure was verified, informed consent was obtained  At the conclusion of our conversation, the patient wished to proceed with surgery      [facility] [address] Reconstructive Surgery   [facility] [facility] [address]   [address] [facility], [address]    Office: [phone]      Subjective   Patient ID: Kaylan Falcon  is a 73 y o  male  Kaylan Falcon  is being seen today for preoperative evaluation  Since last visit, there has not been any changes in the patient's overall health status and/or surgical plan to report  Patient has a history of right diabetic ulcer , atrial fibrillation on anticoagulation , right clubfoot who presents elective right foot fusion and reconstruction with external fixator application on 3/26/3992  Patient did well initially, however unfortunately and postoperative 2  Presented with surgical site infection and sepsis the require hospital readmission an emergent surgery on February 17 with hematoma evacuation, fasciotomy for compartment syndrome as well as removal of the external fixator  Patient's symptoms progressed and fortunately was able to salvage his reconstruction and underwent eventual removal of the nail and a TMA amputation on 2/22/2021  Patient has been left with large secondary defect with exposure of bone for which I was asked to assist with evaluation and closure of this in order to salvage his recent TMA stump      Patient underwent serial debridement in preparation for closure and options were discussed  Patient wanted to pursue limb salvage  Our rationale for reconstruction with free tissue transfer was discussed multiple times while inpatient       They present preoperatively for planned: Right foot excisional debridement, closure with free tissue transfer    Scheduled for: 4/7/2021    In conjunction with: n/a     The following history of N/V post operative: none    Nicotine status: none    Vitals:    03/25/21 0922   BP: 146/95   Pulse: 96   Temp: 97 7 °F (36 5 °C)     HPI    The following portions of the patient's history were reviewed and updated as appropriate: allergies, current medications, past family history, past medical history, past social history, past surgical history and problem list     Review of Systems   Constitutional: Negative  HENT: Negative  Eyes: Negative  Respiratory: Negative  Cardiovascular: Positive for leg swelling  Gastrointestinal: Negative  Endocrine: Negative  Genitourinary: Negative  Musculoskeletal: Positive for arthralgias and gait problem  Skin: Positive for wound  Allergic/Immunologic: Negative  Neurological: Positive for numbness  Hematological: Bruises/bleeds easily  Psychiatric/Behavioral: Negative  Objective   Physical Exam   Constitutional  He appears well-developed  Eyes  Pupils are equal, round, and reactive to light  Cardiovascular: Normal rate  Pulmonary/Chest  Effort normal      Psychiatric  He has a normal mood and affect  His behavior is normal  Judgment and thought content normal      Extremities    Legs:        Legs:          Body mass index is 27 75 kg/m²

## 2021-03-25 NOTE — H&P
Assessment/Plan   Diagnoses and all orders for this visit:    Diabetic ulcer of right 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, with fat layer exposed ([facility] [address] )    S/P transmetatarsal amputation of foot, right ([facility] [address] )    Other orders  -     metFORMIN (GLUCOPHAGE-XR) 750 mg 24 hr tablet            During today's  30 minute visit, all of which was dedicated to counseling, I reviewed the anticipated preoperative, intraoperative, and postoperative courses  I informed him that the nurses will contact the patient the day prior to surgery in order to discuss orientation and logistical information  I will then see him the day of surgery where I will answer any last minute questions and perform my preoperative markings  We will then proceed to the operating room for an anticipated 8 hour procedure under general anesthesia  For each procedure, I reviewed the incisions/scars, duration, recovery time, postoperative restrictions, and follow-up  When applicable, I discussed hospitalization, dressing changes, drains, and donor site morbidity  All risks, benefits, and options, including but not limited to, pain, scarring, bleeding, infection, asymmetry, contour deformity, damage to adjacent nerves, vessels, and organs, hematoma, seroma, paresthesias, anesthesia (temporary versus permanent), skin necrosis, fat necrosis, flap necrosis, wound dehiscence with need for healing by secondary intention and postoperative dressing changes, hypertrophic and/or keloid scar formation, deep venous thrombosis, pulmonary embolism, recurrence, and need for revision and/or reoperation were fully explained to the patient  All questions were answered  Once full understanding of the anticipated procedure was verified, informed consent was obtained  At the conclusion of our conversation, the patient wished to proceed with surgery      [facility] [place] [address] Reconstructive Surgery   [address] [address]   [address] [facility], [address]    Office: [phone]      Subjective   Patient ID: Sukhdeep Abebe  is a 73 y o  male  Sukhdeep Abebe  is being seen today for preoperative evaluation  Since last visit, there has not been any changes in the patient's overall health status and/or surgical plan to report  Patient has a history of right diabetic ulcer , atrial fibrillation on anticoagulation , right clubfoot who presents elective right foot fusion and reconstruction with external fixator application on 6/82/7723  Patient did well initially, however unfortunately and postoperative 2  Presented with surgical site infection and sepsis the require hospital readmission an emergent surgery on February 17 with hematoma evacuation, fasciotomy for compartment syndrome as well as removal of the external fixator  Patient's symptoms progressed and fortunately was able to salvage his reconstruction and underwent eventual removal of the nail and a TMA amputation on 2/22/2021  Patient has been left with large secondary defect with exposure of bone for which I was asked to assist with evaluation and closure of this in order to salvage his recent TMA stump      Patient underwent serial debridement in preparation for closure and options were discussed  Patient wanted to pursue limb salvage  Our rationale for reconstruction with free tissue transfer was discussed multiple times while inpatient       They present preoperatively for planned: Right foot excisional debridement, closure with free tissue transfer    Scheduled for: 4/7/2021    In conjunction with: n/a     The following history of N/V post operative: none    Nicotine status: none    Vitals:    03/25/21 0922   BP: 146/95   Pulse: 96   Temp: 97 7 °F (36 5 °C)     HPI    The following portions of the patient's history were reviewed and updated as appropriate: allergies, current medications, past family history, past medical history, past social history, past surgical history and problem list     Review of Systems   Constitutional: Negative  HENT: Negative  Eyes: Negative  Respiratory: Negative  Cardiovascular: Positive for leg swelling  Gastrointestinal: Negative  Endocrine: Negative  Genitourinary: Negative  Musculoskeletal: Positive for arthralgias and gait problem  Skin: Positive for wound  Allergic/Immunologic: Negative  Neurological: Positive for numbness  Hematological: Bruises/bleeds easily  Psychiatric/Behavioral: Negative  Objective   Physical Exam   Constitutional  He appears well-developed  Eyes  Pupils are equal, round, and reactive to light  Cardiovascular: Normal rate  Pulmonary/Chest  Effort normal      Psychiatric  He has a normal mood and affect  His behavior is normal  Judgment and thought content normal      Extremities    Legs:        Legs:          Body mass index is 27 75 kg/m²

## 2021-03-30 NOTE — PRE-PROCEDURE INSTRUCTIONS
Pre-Surgery Instructions:   Medication Instructions    Acetaminophen (TYLENOL ARTHRITIS PAIN PO) Continue to take this medication on your normal schedule  If this is an oral medication and you take it in the morning, then you may take this medicine with a sip of water   atorvastatin (LIPITOR) 40 mg tablet Continue to take this medication on your normal schedule  If this is an oral medication and you take it in the morning, then you may take this medicine with a sip of water   diltiazem (CARDIZEM) 120 MG tablet Continue to take this heart medication on your normal schedule  If this is an oral medication and you take it in the morning, then you may take this medicine with a sip of water   doxycycline hyclate (VIBRAMYCIN) 100 mg capsule Continue to take this medication on your normal schedule  If this is an oral medication and you take it in the morning, then you may take this medicine with a sip of water   Eliquis 5 MG Patient was instructed to contact Physician for medication instruction   glimepiride (AMARYL) 4 mg tablet As per Anesthesia guidelines, continue this medication as ordered up until and including the night before the DOS, but do not take on the morning of the DOS   Januvia 100 MG tablet As per Anesthesia guidelines, continue this medication as ordered up until and including the night before the DOS, but do not take on the morning of the DOS   lisinopril (ZESTRIL) 40 mg tablet Continue this medication up to the evening before surgery/procedure, but do not take the morning of the day of surgery    metFORMIN (GLUCOPHAGE) 500 mg tablet As per Anesthesia guidelines, continue this medication as ordered up until and including the night before the DOS, but do not take on the morning of the DOS  Covid screening negative as per patient  Reviewed with patient via phone all medications   Patient states he will get instructions about Eliquis from PCP, Dr Shanita Wu, at M/C appt 04/01/2021  Advised not to take NSAID's, Vitamins or Herbal products for 7 days prior to the DOS  Tylenol products are ok to take  Reviewed showering instructions as given by Surgical office  Informed about call from [facility] with time of scheduled surgery  Patient verbalized understanding  ACE/ARB Med Class  Continue this medication up to the evening before surgery/procedure, but do not take the morning of the day of surgery  Acetaminophen Med Class  Continue to take this medication on your normal schedule  If this is an oral medication and you take it in the morning, then you may take this medicine with a sip of water  Calcium Channel Blocker Med Class  Continue to take this heart medication on your normal schedule  If this is an oral medication and you take it in the morning, then you may take this medicine with a sip of water  Direct Xa Inhibitor Med Class  Stop taking this medication at least 3 days prior to surgery/procedure with prescribing Physician and Surgeon consultation  Insulin Med Class  Pre-Surgery/Procedure Instructions for Adult Patients who Take Medicine for Diabetes or to Control their Blood Sugar     Day Before Surgery/Procedure  Use the directions based on the type of medicine you take for your diabetes  1  If you are having a procedure that does not require a bowel prep:  ? Pre-Mixed Insulin (Intermediate Acting: Humalog 75/25, Humulin 70/30  Novolog 70/30, Regular Insulin)  § Take ½ your regular dose the evening before your procedure  ? Rapid/Fast Acting Insulin/Long Acting Insulin (Humalog U200, NovoLog, Apidra, Lantus, Levemir, Suzanne Ni, [place])  § Take your FULL regular dose the day before procedure  ? Oral Diabetic Medicines including Glipizide/Glimepiride/Glucotrol (sulfonylurea)  § Take your regular dose with dinner the evening before your procedure    2  If you are having a procedure (e g  Colonoscopy) that requires a bowel prep and you are allowed to have at least a clear liquid diet:  ? Pre-Mixed Insulin (Intermediate Acting: Humalog 75/25, Humulin 70/30, Novolog 70/30, Regular Insulin)  § Take ½ your regular dose the evening before your procedure  ? Rapid/Fast Acting Insulin (Humalog U200, NovoLog, Apidra, Fiasp)  § Take ½ your regular dose the evening before your procedure  ? Long Acting Insulin (Lantus, Levemir, Donnajean Beny)  § Take your FULL regular dose the day before procedure  ? Oral Glipizide/Glimepiride/Glucotrol (sulfonylurea)  § Take ½ your regular dose the evening before your procedure  ? Oral Diabetic Medicines that are NOT Glipizide/Glimepiride/Glucotrol  § Take your regular dose with dinner in the evening before your procedure      Day of Surgery/Procedure  · Long Acting Insulin (Lantus, Levemir, Donnajean Beny)  ? If you usually take your Long-Acting Insulin in the morning, take the full dose as scheduled  · With the exception of the morning Long-Acting Insulin noted above, DO NOT take ANY diabetic medicine on the day of your procedure unless you were instructed by the doctor who manages your diabetic medicines  · Continue to check your blood sugars  · If you have an insulin pump then consult with your Endocrinologist for instructions  · If you cannot see your Endocrinologist, on the day of the procedure set your insulin pump to your basal rate only  Please bring your insulin pump supplies to the hospital      This Educational material has been approved by the Patient Education Advisory Committee  Date prepared: 1/17/2018          Expiration date: 1/17/2019        Approval Number:         Statin Med Class  Continue to take this medication on your normal schedule  If this is an oral medication and you take it in the morning, then you may take this medicine with a sip of water

## 2021-04-01 ENCOUNTER — OFFICE VISIT (OUTPATIENT)
Dept: FAMILY MEDICINE CLINIC | Facility: CLINIC | Age: 71
End: 2021-04-01
Payer: COMMERCIAL

## 2021-04-01 ENCOUNTER — TELEPHONE (OUTPATIENT)
Dept: INFECTIOUS DISEASES | Facility: CLINIC | Age: 71
End: 2021-04-01

## 2021-04-01 VITALS
HEART RATE: 90 BPM | BODY MASS INDEX: 27.75 KG/M2 | TEMPERATURE: 98.2 F | SYSTOLIC BLOOD PRESSURE: 132 MMHG | HEIGHT: 77 IN | OXYGEN SATURATION: 100 % | DIASTOLIC BLOOD PRESSURE: 82 MMHG | WEIGHT: 235 LBS

## 2021-04-01 DIAGNOSIS — R80.9 POSITIVE FOR MICROALBUMINURIA: Chronic | ICD-10-CM

## 2021-04-01 DIAGNOSIS — E11.621 DIABETIC ULCER OF RIGHT MIDFOOT ASSOCIATED WITH TYPE 2 DIABETES MELLITUS, WITH FAT LAYER EXPOSED (HCC): ICD-10-CM

## 2021-04-01 DIAGNOSIS — N40.1 BENIGN PROSTATIC HYPERPLASIA WITH WEAK URINARY STREAM: Chronic | ICD-10-CM

## 2021-04-01 DIAGNOSIS — Z89.421 ACQUIRED ABSENCE OF OTHER RIGHT TOE(S) (HCC): ICD-10-CM

## 2021-04-01 DIAGNOSIS — Z01.818 PREOP TESTING: ICD-10-CM

## 2021-04-01 DIAGNOSIS — I10 ESSENTIAL HYPERTENSION: Chronic | ICD-10-CM

## 2021-04-01 DIAGNOSIS — Z89.431 S/P TRANSMETATARSAL AMPUTATION OF FOOT, RIGHT (HCC): ICD-10-CM

## 2021-04-01 DIAGNOSIS — L97.412 DIABETIC ULCER OF RIGHT MIDFOOT ASSOCIATED WITH TYPE 2 DIABETES MELLITUS, WITH FAT LAYER EXPOSED (HCC): ICD-10-CM

## 2021-04-01 DIAGNOSIS — R39.12 BENIGN PROSTATIC HYPERPLASIA WITH WEAK URINARY STREAM: Chronic | ICD-10-CM

## 2021-04-01 DIAGNOSIS — E78.5 HYPERLIPIDEMIA, UNSPECIFIED HYPERLIPIDEMIA TYPE: Chronic | ICD-10-CM

## 2021-04-01 DIAGNOSIS — Q66.01 CONGENITAL TALIPES EQUINOVARUS, RIGHT FOOT: ICD-10-CM

## 2021-04-01 DIAGNOSIS — E11.40 TYPE 2 DIABETES MELLITUS WITH DIABETIC NEUROPATHY, WITHOUT LONG-TERM CURRENT USE OF INSULIN (HCC): Primary | Chronic | ICD-10-CM

## 2021-04-01 DIAGNOSIS — E11.40 TYPE 2 DIABETES MELLITUS WITH DIABETIC NEUROPATHY, UNSPECIFIED WHETHER LONG TERM INSULIN USE (HCC): Chronic | ICD-10-CM

## 2021-04-01 LAB — SARS-COV-2 RNA RESP QL NAA+PROBE: NEGATIVE

## 2021-04-01 PROCEDURE — 99214 OFFICE O/P EST MOD 30 MIN: CPT | Performed by: FAMILY MEDICINE

## 2021-04-01 PROCEDURE — U0003 INFECTIOUS AGENT DETECTION BY NUCLEIC ACID (DNA OR RNA); SEVERE ACUTE RESPIRATORY SYNDROME CORONAVIRUS 2 (SARS-COV-2) (CORONAVIRUS DISEASE [COVID-19]), AMPLIFIED PROBE TECHNIQUE, MAKING USE OF HIGH THROUGHPUT TECHNOLOGIES AS DESCRIBED BY CMS-2020-01-R: HCPCS | Performed by: PLASTIC SURGERY

## 2021-04-01 PROCEDURE — 3008F BODY MASS INDEX DOCD: CPT | Performed by: PLASTIC SURGERY

## 2021-04-01 PROCEDURE — U0005 INFEC AGEN DETEC AMPLI PROBE: HCPCS | Performed by: PLASTIC SURGERY

## 2021-04-01 PROCEDURE — 1036F TOBACCO NON-USER: CPT | Performed by: FAMILY MEDICINE

## 2021-04-01 PROCEDURE — 1160F RVW MEDS BY RX/DR IN RCRD: CPT | Performed by: FAMILY MEDICINE

## 2021-04-01 PROCEDURE — 93000 ELECTROCARDIOGRAM COMPLETE: CPT | Performed by: FAMILY MEDICINE

## 2021-04-01 PROCEDURE — 3066F NEPHROPATHY DOC TX: CPT | Performed by: PLASTIC SURGERY

## 2021-04-01 NOTE — PROGRESS NOTES
BMI Counseling: Body mass index is 27 87 kg/m²  The BMI is above normal  Nutrition recommendations include reducing portion sizes, decreasing overall calorie intake, 3-5 servings of fruits/vegetables daily, reducing fast food intake, consuming healthier snacks, decreasing soda and/or juice intake, moderation in carbohydrate intake, increasing intake of lean protein, reducing intake of saturated fat and trans fat and reducing intake of cholesterol  Exercise recommendations include moderate aerobic physical activity for 150 minutes/week and exercising 3-5 times per week

## 2021-04-01 NOTE — PROGRESS NOTES
TCM Call (since 3/1/2021)     Date and time call was made  3/11/2021 12:50 PM    Hospital care reviewed  Records reviewed    Patient was hospitialized at  [address] [place] [address]        Date of Admission  02/16/21    Date of discharge  03/10/21    Diagnosis  Sepsis;  cellulitis [facility]    Were the patients medications reviewed and updated  Yes    Current Symptoms  None      TCM Call (since 3/1/2021)     Post hospital issues  None    Should patient be enrolled in anticoag monitoring? No    Scheduled for follow up? Yes    Did you obtain your prescribed medications  Yes    Do you need help managing your prescriptions or medications  No    Is transportation to your appointment needed  No    I have advised the patient to call PCP with any new or worsening symptoms  yesika guerin cma    Living Arrangements  Family members    Support System  None    Are you recieving any outpatient services  No    Are you recieving home care services  No    Are you using any community resources  No    Current waiver services  No    Have you fallen in the last 12 months  No    Interperter language line needed  No    Counseling  Patient    Counseling topics  Activities of daily living;  Importance of RX compliance

## 2021-04-01 NOTE — PATIENT INSTRUCTIONS
Stop the Eliquis the 4th of April: last dose the 3rd of April  No Amaryl the day before or the day of surgery  No Lisinipril the day before or day of surgery  No Januvia or Metformin the day of surgery

## 2021-04-01 NOTE — PROGRESS NOTES
[facility] PRE-OPERATIVE EVALUATION  [facility] PHYSICIAN GROUP - [facility]    NAME: Wing Pineda  AGE: 73 y o  SEX: male  : 1950     DATE: 2021    [facility] Pre-Operative Evaluation      Chief Complaint: Pre-operative Evaluation     Surgery: plastic repair R foot   Anticipated Date of Surgery: 21     History of Present Illness:     Patient has no prior history of bleeding issues or blood clots  Chronic conditions, medications and allergies were reviewed  There is no currently active infectious process  Assessment of Cardiac Risk:  · No unstable or severe angina or MI in the last 6 weeks or history of stent placement in the last year   · No decompensated heart failure (e g  New onset heart failure, NYHA functional class IV heart failure, or worsening existing heart failure) in past 3 mos  · No severe heart valve disease including aortic stenosis or symptomatic mitral stenosis     Exercise Capacity:  · Able to walk 4 blocks without symptoms  · Able to walk 2 flights without symptoms    NO Prior Anesthesia Reactions       No prolonged steroid use in past 6 mos  P A T  If done reviewed  Review of Systems:     Review of Systems   Constitutional: Negative for activity change and appetite change  HENT: Negative for trouble swallowing  Eyes: Negative for visual disturbance  Respiratory: Negative for cough and shortness of breath  Cardiovascular: Negative for chest pain, palpitations and leg swelling  Gastrointestinal: Negative for abdominal pain and blood in stool  Endocrine: Negative for polyuria  Genitourinary: Negative for difficulty urinating and hematuria  Skin: Negative for rash  Neurological: Negative for dizziness  Psychiatric/Behavioral: Negative for behavioral problems         Current Problem List:     Patient Active Problem List   Diagnosis    Hyperlipidemia    Type 2 diabetes mellitus with diabetic neuropathy [facility])    Atrial fibrillation with RVR ([facility] [address] )    Right middle lobe pulmonary nodule    Essential hypertension    Acquired absence of other right toe(s) (HCC)    Benign prostatic hyperplasia with lower urinary tract symptoms    Positive for microalbuminuria    Deep disruption or dehiscence of operation wound    Post-operative state    Aftercare for amputation stump    Ambulatory dysfunction    Diabetic ulcer of right 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, with fat layer exposed ([facility] [address] )    S/P transmetatarsal amputation of foot, right ([facility] )       Allergies: Allergies   Allergen Reactions    Simvastatin Myalgia    Itraconazole Other (See Comments)     Pt denies knowledge of allergy           Current Medications:       Current Outpatient Medications:     Acetaminophen (TYLENOL ARTHRITIS PAIN PO), Take 650 mg by mouth 2 (two) times a day, Disp: , Rfl:     atorvastatin (LIPITOR) 40 mg tablet, Take 1 tablet (40 mg total) by mouth daily, Disp: 90 tablet, Rfl: 3    Blood Glucose Monitoring Suppl (ACCU-CHEK JYOTI SMARTVIEW) w/Device KIT, use to check Blood Sugar as needed, Disp: , Rfl:     diltiazem (CARDIZEM) 120 MG tablet, Take 1 tablet (120 mg total) by mouth 2 (two) times a day, Disp: 180 tablet, Rfl: 6    doxycycline hyclate (VIBRAMYCIN) 100 mg capsule, Take 1 capsule (100 mg total) by mouth every 12 (twelve) hours, Disp: 72 capsule, Rfl: 0    Eliquis 5 MG, Take 1 tablet (5 mg total) by mouth 2 (two) times a day, Disp: 60 tablet, Rfl: 2    glimepiride (AMARYL) 4 mg tablet, Take one tablet by mouth twice daily (Patient taking differently: Take 4 mg by mouth 2 (two) times a day Take one tablet by mouth twice daily), Disp: 180 tablet, Rfl: 3    glucose blood (Accu-Chek SmartView) test strip, Use as instructed BID, Disp: 200 each, Rfl: 1    Januvia 100 MG tablet, Take 1 tablet (100 mg total) by mouth daily, Disp: 132 tablet, Rfl: 3    lisinopril (ZESTRIL) 40 mg tablet, Take 1 tablet (40 mg total) by mouth daily, Disp: 90 tablet, Rfl: 3    metFORMIN (GLUCOPHAGE) 500 mg tablet, Take 750 mg by mouth 2 (two) times a day with meals, Disp: , Rfl:     Past Medical History:       Past Medical History:   Diagnosis Date    Arthritis     Atrial fibrillation ([facility] [address] )     Diagnosed 11/2018    Benign prostate hyperplasia 04/2002    Cellulitis     right lower leg     Colon polyp 2006    Diabetes mellitus ([facility] [address] )     Hearing aid worn     bilat    Hearing loss     90% loss left ear and 40% right ear    History of clubfoot     "since birth"    History of pneumonia     History of TIA (transient ischemic attack) 04/25/2017 11/30/18 pt denies    Hyperlipidemia 5/15/2014    Hypertension     Infectious viral hepatitis     "cant remember type"    Irregular heart beat     Neuropathy     both feet    Osteomyelitis ([facility] [address] )     right great toe    Right middle lobe pulmonary nodule 11/6/2018    Seasickness     Teeth missing     Type 2 diabetes mellitus with diabetic neuropathy ([facility] [address] ) 11/6/2018    Wears glasses     reading    Wound, open     bottom of right foot        Past Surgical History:   Procedure Laterality Date    BUNIONECTOMY Right 12/4/2018    Procedure: 5TH METATARSAL BONE PARTIAL RESECTION, FULL THICKNESS DEBRIDEMENT OF DIABETIC ULCER;  Surgeon: Daniel Winters DPM;  [facility];  Service: Podiatry    CLUB FOOT RELEASE Bilateral     COLONOSCOPY      EXTERNAL FIXATOR APPLICATION Right 1/56/3286    Procedure: Application multiplane external fixation;  Surgeon: Tomas Selby DPM;  [facility];  Service: Podiatry    FOOT HARDWARE REMOVAL Right 2/17/2021    Procedure: REMOVAL EXTERNAL FIXATOR;  Surgeon: Tomas Selby DPM;  [facility];  Service: Podiatry    INCISION AND DRAINAGE OF WOUND Right 2/17/2021    Procedure: INCISION AND DRAINAGE (I&D) EXTREMITY;  Surgeon: Tomas Selby DPM;  [facility];  Service: Podiatry    ORIF FOOT FRACTURE Right 3/4/2021 Procedure: VAC PLACEMENT; SCREW FIXATION RIGHT FOOT FUSION;  Surgeon: Mars Hernandes DPM;  [facility];  Service: Podiatry    [address] Right 2/22/2021    Procedure: AMPUTATION TRANSMETATARSAL (TMA), REMOVAL NAIL IM T2 ICF HARDWARE;  Surgeon: Mars Hernandes DPM;  [facility];  Service: Podiatry    GA FUSION FOOT BONES,MIDTARSAL,OSTEOTMY Right 2/12/2021    Procedure: foot ARTHRODESIS/FUSION;  Surgeon: Mars Hernandes DPM;  [facility];  Service: Podiatry    GA LENGTH/SHORT LEG/ANKL TENDON,SINGLE Right 2/12/2021    Procedure: LENGTHEN TIBIAL TENDON, TRANS HEEL CORD;  Surgeon: Mars Hernandes DPM;  [facility];  Service: Podiatry    GA PART [facility] Right 12/29/2020    Procedure: EXCISION EXOSTOSIS;  Surgeon: Mars Hernandes DPM;  [facility];  Service: Geraldo Rm WND EXTEN/COMPLIC Right 10/72/9017    Procedure: PRIMARY DELAYED CLOSURE;  Surgeon: Mars Hernandes DPM;  [facility];  Service: Podiatry    TOE AMPUTATION Right     partial great toe    TONSILLECTOMY      VAC DRESSING APPLICATION Right 4/0/2237    Procedure: APPLICATION VAC DRESSING EXTREMITY;  Surgeon: Chayito Hugo MD;  [facility];  Service: Plastics    [address] Right 3/1/2021    Procedure: DEBRIDEMENT LOWER EXTREMITY ([address] OUT);   Surgeon: Chayito Hugo MD;  [facility];  Service: Plastics        Family History   Problem Relation Age of Onset    Diabetes Father         mellitus        Social History     Socioeconomic History    Marital status: /Civil Union     Spouse name: Not on file    Number of children: Not on file    Years of education: Not on file    Highest education level: Not on file   Occupational History    Not on file   Social Needs    Financial resource strain: Not on file    Food insecurity     Worry: Not on file Inability: Not on file    Transportation needs     Medical: Not on file     Non-medical: Not on file   Tobacco Use    Smoking status: Former Smoker     Types: Cigarettes     Quit date: 1988     Years since quittin 4    Smokeless tobacco: Former User    Tobacco comment: exposure to passive smoke   Substance and Sexual Activity    Alcohol use: Yes     Frequency: 2-4 times a month     Drinks per session: 1 or 2     Comment: socially    Drug use: Not Currently    Sexual activity: Yes     Partners: Female   Lifestyle    Physical activity     Days per week: Not on file     Minutes per session: Not on file    Stress: Not on file   Relationships    Social connections     Talks on phone: Not on file     Gets together: Not on file     Attends [employer]ism service: Not on file     Active member of club or organization: Not on file     Attends meetings of clubs or organizations: Not on file     Relationship status: Not on file    Intimate partner violence     Fear of current or ex partner: Not on file     Emotionally abused: Not on file     Physically abused: Not on file     Forced sexual activity: Not on file   Other Topics Concern    Not on file   Social History Narrative    Not on file        Physical Exam:     /82 (BP Location: Left arm, Patient Position: Sitting, Cuff Size: Large)   Pulse 90   Temp 98 2 °F (36 8 °C) (Temporal)   Ht 6' 5" (1 956 m)   Wt 107 kg (235 lb)   SpO2 100%   BMI 27 87 kg/m²     Physical Exam  Constitutional:       Appearance: He is well-developed  HENT:      Head: Normocephalic and atraumatic  Eyes:      Conjunctiva/sclera: Conjunctivae normal    Neck:      Musculoskeletal: Neck supple  Thyroid: No thyromegaly  Cardiovascular:      Rate and Rhythm: Normal rate and regular rhythm  Heart sounds: Normal heart sounds  No murmur  Pulmonary:      Effort: Pulmonary effort is normal  No respiratory distress  Breath sounds: Normal breath sounds  Lymphadenopathy:      Cervical: No cervical adenopathy  Skin:     General: Skin is warm and dry  Psychiatric:         Behavior: Behavior normal          Operative site has been examined and clear of skin infection and inflammation  Assessment & Recommendations:     Patient is cleared for surgery as detailed above  Surgical Procedure risk category: Moderate     Patient specific operative risk categegory: Moderate     Recent A1C : 7 9        Todays ECG = no change from one month ago  : Afib X 100- 105

## 2021-04-01 NOTE — TELEPHONE ENCOUNTER
Pt is no longer at [facility]  Pt was discharged home w/ [facility]

## 2021-04-02 ENCOUNTER — TELEPHONE (OUTPATIENT)
Dept: FAMILY MEDICINE CLINIC | Facility: CLINIC | Age: 71
End: 2021-04-02

## 2021-04-02 ENCOUNTER — OFFICE VISIT (OUTPATIENT)
Dept: WOUND CARE | Facility: HOSPITAL | Age: 71
End: 2021-04-02
Payer: COMMERCIAL

## 2021-04-02 ENCOUNTER — TELEPHONE (OUTPATIENT)
Dept: PLASTIC SURGERY | Facility: CLINIC | Age: 71
End: 2021-04-02

## 2021-04-02 VITALS
RESPIRATION RATE: 20 BRPM | TEMPERATURE: 97.6 F | HEART RATE: 72 BPM | SYSTOLIC BLOOD PRESSURE: 122 MMHG | DIASTOLIC BLOOD PRESSURE: 70 MMHG

## 2021-04-02 DIAGNOSIS — L97.412 DIABETIC ULCER OF RIGHT MIDFOOT ASSOCIATED WITH TYPE 2 DIABETES MELLITUS, WITH FAT LAYER EXPOSED (HCC): Primary | ICD-10-CM

## 2021-04-02 DIAGNOSIS — E11.621 DIABETIC ULCER OF RIGHT MIDFOOT ASSOCIATED WITH TYPE 2 DIABETES MELLITUS, WITH FAT LAYER EXPOSED (HCC): Primary | ICD-10-CM

## 2021-04-02 PROCEDURE — 99024 POSTOP FOLLOW-UP VISIT: CPT | Performed by: PODIATRIST

## 2021-04-02 PROCEDURE — 99214 OFFICE O/P EST MOD 30 MIN: CPT | Performed by: PODIATRIST

## 2021-04-02 PROCEDURE — G0463 HOSPITAL OUTPT CLINIC VISIT: HCPCS | Performed by: PODIATRIST

## 2021-04-02 RX ORDER — LIDOCAINE HYDROCHLORIDE 40 MG/ML
5 SOLUTION TOPICAL ONCE
Status: COMPLETED | OUTPATIENT
Start: 2021-04-02 | End: 2021-04-02

## 2021-04-02 RX ADMIN — LIDOCAINE HYDROCHLORIDE 5 ML: 40 SOLUTION TOPICAL at 09:43

## 2021-04-02 NOTE — TELEPHONE ENCOUNTER
Pre-op exam 4/1/2021  laterality is incorrect patient is having surgery on RIGHT foot not left   Please change in note   Surgery is scheduled for 4/7/2021

## 2021-04-02 NOTE — PROGRESS NOTES
Patient ID: Nogc Martinez  is a 73 y o  male Date of Birth 1950     My role is Foot, Ankle and Wound Specialist    Chief Complaint   Patient presents with    Follow Up Wound Care Visit     Right Foot wounds      Diabetic foot ulcer    Subjective:   Kalli Lyon is now home on [address] while inpatient and at [facility]  He is NWB on the foot now he states  His free flap is scheduled for 4/7        The following portions of the patient's history were reviewed and updated as appropriate:   Patient Active Problem List   Diagnosis    Hyperlipidemia    Type 2 diabetes mellitus with diabetic neuropathy ([address] )    Atrial fibrillation with RVR ([address] )    Right middle lobe pulmonary nodule    Essential hypertension    Acquired absence of other right toe(s) ([address] )    Benign prostatic hyperplasia with lower urinary tract symptoms    Positive for microalbuminuria    Deep disruption or dehiscence of operation wound    Post-operative state    Aftercare for amputation stump    Ambulatory dysfunction    Diabetic ulcer of right 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, with fat layer exposed ([address] )    S/P transmetatarsal amputation of foot, right ([facility] )     Past Medical History:   Diagnosis Date    Arthritis     Atrial fibrillation ([address] )     Diagnosed 11/2018    Benign prostate hyperplasia 04/2002    Cellulitis     right lower leg     Colon polyp 2006    Diabetes mellitus ([address] )     Hearing aid worn     bilat    Hearing loss     90% loss left ear and 40% right ear    History of clubfoot     "since birth"    History of pneumonia     History of TIA (transient ischemic attack) 04/25/2017 11/30/18 pt denies    Hyperlipidemia 5/15/2014    Hypertension     Infectious viral hepatitis     "cant remember type"    Irregular heart beat     Neuropathy     both feet    Osteomyelitis ([address] )     right great toe    Right middle lobe pulmonary nodule 11/6/2018    Seasickness     Teeth missing     Type 2 diabetes mellitus with diabetic neuropathy ([facility] ) 11/6/2018    Wears glasses     reading    Wound, open     bottom of right foot     Past Surgical History:   Procedure Laterality Date    BUNIONECTOMY Right 12/4/2018    Procedure: 5TH METATARSAL BONE PARTIAL RESECTION, FULL THICKNESS DEBRIDEMENT OF DIABETIC ULCER;  Surgeon: Jon Shaikh DPM;  [facility];  Service: Podiatry    CLUB FOOT RELEASE Bilateral     COLONOSCOPY      EXTERNAL FIXATOR APPLICATION Right 2/80/2709    Procedure: Application multiplane external fixation;  Surgeon: Cj Ford DPM;  [facility];  Service: Podiatry    FOOT HARDWARE REMOVAL Right 2/17/2021    Procedure: REMOVAL EXTERNAL FIXATOR;  Surgeon: Cj Ford DPM;  [facility];  Service: Podiatry    INCISION AND DRAINAGE OF WOUND Right 2/17/2021    Procedure: INCISION AND DRAINAGE (I&D) EXTREMITY;  Surgeon: Cj Ford DPM;  [facility];  Service: Podiatry    ORIF FOOT FRACTURE Right 3/4/2021    Procedure: VAC PLACEMENT; SCREW FIXATION RIGHT FOOT FUSION;  Surgeon: Cj Ford DPM;  [facility];  Service: Podiatry    [address] [place] [address] Right 2/22/2021    Procedure: AMPUTATION TRANSMETATARSAL (TMA), REMOVAL NAIL IM T2 ICF HARDWARE;  Surgeon: Cj Ford DPM;  [facility];  Service: Podiatry    UT FUSION FOOT BONES,MIDTARSAL,OSTEOTMY Right 2/12/2021    Procedure: foot ARTHRODESIS/FUSION;  Surgeon: Cj Ford DPM;  [facility];  Service: Podiatry    UT LENGTH/SHORT LEG/ANKL TENDON,SINGLE Right 2/12/2021    Procedure: LENGTHEN TIBIAL TENDON, TRANS HEEL CORD;  Surgeon: Cj Ford DPM;  [facility];  Service: Podiatry    UT PART [address] BONE METATARSAL HEAD,EA Right 12/29/2020    Procedure: EXCISION EXOSTOSIS;  Surgeon: Cj Ford DPM;  [facility];  Service: Podiatry    UT SECD CLOS SURG WND EXTEN/COMPLIC Right 79/19/0148    Procedure: PRIMARY DELAYED CLOSURE;  Surgeon: Cj Ford DPM;  [facility];  Service: Podiatry    TOE AMPUTATION Right     partial great toe    TONSILLECTOMY      VAC DRESSING APPLICATION Right 7598    Procedure: APPLICATION VAC DRESSING EXTREMITY;  Surgeon: Davina Aleman MD;  [facility];  Service: Plastics    [facility] Right 3/1/2021    Procedure: DEBRIDEMENT LOWER EXTREMITY [facility] OUT);   Surgeon: Davina Aleman MD;  [facility];  Service: Plastics     Social History     Socioeconomic History    Marital status: /Civil Union     Spouse name: None    Number of children: None    Years of education: None    Highest education level: None   Occupational History    None   Social Needs    Financial resource strain: None    Food insecurity     Worry: None     Inability: None    Transportation needs     Medical: None     Non-medical: None   Tobacco Use    Smoking status: Former Smoker     Types: Cigarettes     Quit date: 1988     Years since quittin 4    Smokeless tobacco: Former User    Tobacco comment: exposure to passive smoke   Substance and Sexual Activity    Alcohol use: Yes     Frequency: 2-4 times a month     Drinks per session: 1 or 2     Comment: socially    Drug use: Not Currently    Sexual activity: Yes     Partners: Female   Lifestyle    Physical activity     Days per week: None     Minutes per session: None    Stress: None   Relationships    Social connections     Talks on phone: None     Gets together: None     Attends Bahai service: None     Active member of club or organization: None     Attends meetings of clubs or organizations: None     Relationship status: None    Intimate partner violence     Fear of current or ex partner: None     Emotionally abused: None     Physically abused: None     Forced sexual activity: None   Other Topics Concern    None   Social History Narrative    None        Current Outpatient Medications:   Acetaminophen (TYLENOL ARTHRITIS PAIN PO), Take 650 mg by mouth 2 (two) times a day, Disp: , Rfl:     atorvastatin (LIPITOR) 40 mg tablet, Take 1 tablet (40 mg total) by mouth daily, Disp: 90 tablet, Rfl: 3    Blood Glucose Monitoring Suppl (ACCU-CHEK JYOTI SMARTVIEW) w/Device KIT, use to check Blood Sugar as needed, Disp: , Rfl:     diltiazem (CARDIZEM) 120 MG tablet, Take 1 tablet (120 mg total) by mouth 2 (two) times a day, Disp: 180 tablet, Rfl: 6    doxycycline hyclate (VIBRAMYCIN) 100 mg capsule, Take 1 capsule (100 mg total) by mouth every 12 (twelve) hours, Disp: 72 capsule, Rfl: 0    Eliquis 5 MG, Take 1 tablet (5 mg total) by mouth 2 (two) times a day, Disp: 60 tablet, Rfl: 2    glimepiride (AMARYL) 4 mg tablet, Take one tablet by mouth twice daily (Patient taking differently: Take 4 mg by mouth 2 (two) times a day Take one tablet by mouth twice daily), Disp: 180 tablet, Rfl: 3    glucose blood (Accu-Chek SmartView) test strip, Use as instructed BID, Disp: 200 each, Rfl: 1    Januvia 100 MG tablet, Take 1 tablet (100 mg total) by mouth daily, Disp: 132 tablet, Rfl: 3    lisinopril (ZESTRIL) 40 mg tablet, Take 1 tablet (40 mg total) by mouth daily, Disp: 90 tablet, Rfl: 3    metFORMIN (GLUCOPHAGE) 500 mg tablet, Take 500 mg by mouth 2 (two) times a day with meals, Disp: , Rfl:   No current facility-administered medications for this visit  Family History   Problem Relation Age of Onset    Diabetes Father         mellitus      Review of Systems   Constitutional: Positive for activity change  Negative for appetite change, chills, diaphoresis, fatigue, fever and unexpected weight change  Skin: Positive for wound  Allergies  Simvastatin and Itraconazole    Objective:  /70   Pulse 72   Temp 97 6 °F (36 4 °C)   Resp 20     Physical Exam  Vitals signs and nursing note reviewed  Constitutional:       General: He is not in acute distress  Appearance: Normal appearance   He is not ill-appearing, toxic-appearing or diaphoretic  Cardiovascular:      Pulses: Normal pulses  Heart sounds: Normal heart sounds  Pulmonary:      Effort: Pulmonary effort is normal       Breath sounds: Normal breath sounds  Musculoskeletal:      Comments: TMA Right foot   Skin:     Comments: See Wound Assessment  Neurological:      Mental Status: He is alert  Wound 06/04/20 Foot Right;Lateral (Active)   Wound Image   04/02/21 0926   Wound Description Granulation tissue;Pink;Slough; Yellow 04/02/21 0935   Lavern-wound Assessment Edema; Erythema 04/02/21 0935   Wound Length (cm) 5 5 cm 04/02/21 0935   Wound Width (cm) 7 5 cm 04/02/21 0935   Wound Depth (cm) 1 5 cm 04/02/21 0935   Wound Surface Area (cm^2) 41 25 cm^2 04/02/21 0935   Wound Volume (cm^3) 61 88 cm^3 04/02/21 0935   Calculated Wound Volume (cm^3) 61 88 cm^3 04/02/21 0935   Change in Wound Size % -[id number] 89 04/02/21 0935   Drainage Amount Moderate 04/02/21 0935   Drainage Description Serous; Yellow 04/02/21 0935   Non-staged Wound Description Full thickness 04/02/21 0935       Wound 02/12/21 Foot Right (Active)   Wound Image   04/02/21 0926   Wound Description Yellow;Slough;Pink;Granulation tissue 04/02/21 0938   Lavern-wound Assessment Maceration;Edema; Erythema 04/02/21 0938   Wound Length (cm) 0 7 cm 04/02/21 0938   Wound Width (cm) 1 4 cm 04/02/21 0938   Wound Depth (cm) 0 4 cm 04/02/21 0938   Wound Surface Area (cm^2) 0 98 cm^2 04/02/21 0938   Wound Volume (cm^3) 0 39 cm^3 04/02/21 0938   Calculated Wound Volume (cm^3) 0 39 cm^3 04/02/21 0938   Drainage Amount Moderate 04/02/21 0938   Drainage Description Serous 04/02/21 0938   Non-staged Wound Description Full thickness 04/02/21 0938       Wound 03/19/21 Surgical Foot Right;Medial (Active)   Wound Image   04/02/21 0925   Wound Description Granulation tissue;Pink;Yellow;Slough; Exposed bone; Other (Comment) 04/02/21 0937   Lavern-wound Assessment Edema; Erythema 04/02/21 0937   Wound Length (cm) 5 7 cm 04/02/21 0937   Wound Width (cm) 6 cm 04/02/21 0937   Wound Depth (cm) 1 1 cm 04/02/21 0937   Wound Surface Area (cm^2) 34 2 cm^2 04/02/21 0937   Wound Volume (cm^3) 37 62 cm^3 04/02/21 0937   Calculated Wound Volume (cm^3) 37 62 cm^3 04/02/21 0937   Change in Wound Size % 9 52 04/02/21 0937   Drainage Amount Moderate 04/02/21 0937   Drainage Description Serous; Yellow 04/02/21 0937   Non-staged Wound Description Full thickness 04/02/21 0937   Dressing Changed Changed 03/19/21 0824   Patient Tolerance Tolerated well 03/19/21 0824   Dressing Status New drainage; Old drainage;Removed 03/19/21 0824       Wound 03/19/21 Right;Lateral (Active)   Wound Image   03/19/21 0924   Wound Description Beefy red;Granulation tissue; Yellow;Slough 03/19/21 0823   Lavern-wound Assessment Clean;Dry; Intact 03/19/21 0823   Wound Length (cm) 4 5 cm 03/19/21 0823   Wound Width (cm) 8 5 cm 03/19/21 0823   Wound Depth (cm) 1 cm 03/19/21 0823   Wound Surface Area (cm^2) 38 25 cm^2 03/19/21 0823   Wound Volume (cm^3) 38 25 cm^3 03/19/21 0823   Calculated Wound Volume (cm^3) 38 25 cm^3 03/19/21 0823   Drainage Amount Moderate 03/19/21 0823   Drainage Description Serosanguineous 03/19/21 0823   Non-staged Wound Description Full thickness 03/19/21 0823       Wound 03/19/21 Foot Right;Posterior (Active)   Wound Image   03/19/21 0925   Wound Description Eschar;Slough; Yellow 03/19/21 0831   Lavern-wound Assessment Dry; Intact; Clean 03/19/21 0831   Wound Length (cm) 1 cm 03/19/21 0831   Wound Width (cm) 2 9 cm 03/19/21 0831   Wound Depth (cm) 0 cm 03/19/21 0831   Wound Surface Area (cm^2) 2 9 cm^2 03/19/21 0831   Wound Volume (cm^3) 0 cm^3 03/19/21 0831   Calculated Wound Volume (cm^3) 0 cm^3 03/19/21 0831   Drainage Amount Small 03/19/21 0831   Drainage Description Serous 03/19/21 0831   Non-staged Wound Description Full thickness 03/19/21 0831           Wound 06/04/20 Foot Right;Lateral (Active)   Wound Image   04/02/21 0926   Wound Description Granulation tissue;Pink;Slough; Yellow 04/02/21 0935   Lavern-wound Assessment Edema; Erythema 04/02/21 0935   Wound Length (cm) 5 5 cm 04/02/21 0935   Wound Width (cm) 7 5 cm 04/02/21 0935   Wound Depth (cm) 1 5 cm 04/02/21 0935   Wound Surface Area (cm^2) 41 25 cm^2 04/02/21 0935   Wound Volume (cm^3) 61 88 cm^3 04/02/21 0935   Calculated Wound Volume (cm^3) 61 88 cm^3 04/02/21 0935   Change in Wound Size % -[id number] 89 04/02/21 0935   Drainage Amount Moderate 04/02/21 0935   Drainage Description Serous; Yellow 04/02/21 0935   Non-staged Wound Description Full thickness 04/02/21 0935       Wound 02/12/21 Foot Right (Active)   Wound Image   04/02/21 0926   Wound Description Yellow;Slough;Pink;Granulation tissue 04/02/21 0938   Lavern-wound Assessment Maceration;Edema; Erythema 04/02/21 0938   Wound Length (cm) 0 7 cm 04/02/21 0938   Wound Width (cm) 1 4 cm 04/02/21 0938   Wound Depth (cm) 0 4 cm 04/02/21 0938   Wound Surface Area (cm^2) 0 98 cm^2 04/02/21 0938   Wound Volume (cm^3) 0 39 cm^3 04/02/21 0938   Calculated Wound Volume (cm^3) 0 39 cm^3 04/02/21 0938   Drainage Amount Moderate 04/02/21 0938   Drainage Description Serous 04/02/21 0938   Non-staged Wound Description Full thickness 04/02/21 0938       Wound 03/19/21 Surgical Foot Right;Medial (Active)   Wound Image   04/02/21 0925   Wound Description Granulation tissue;Pink;Yellow;Slough; Exposed bone; Other (Comment) 04/02/21 0937   Lavern-wound Assessment Edema; Erythema 04/02/21 0937   Wound Length (cm) 5 7 cm 04/02/21 0937   Wound Width (cm) 6 cm 04/02/21 0937   Wound Depth (cm) 1 1 cm 04/02/21 0937   Wound Surface Area (cm^2) 34 2 cm^2 04/02/21 0937   Wound Volume (cm^3) 37 62 cm^3 04/02/21 0937   Calculated Wound Volume (cm^3) 37 62 cm^3 04/02/21 0937   Change in Wound Size % 9 52 04/02/21 0937   Drainage Amount Moderate 04/02/21 0937   Drainage Description Serous; Yellow 04/02/21 0937   Non-staged Wound Description Full thickness 04/02/21 0937   Dressing Changed Changed 03/19/21 0824   Patient Tolerance Tolerated well 03/19/21 0824   Dressing Status New drainage; Old drainage;Removed 03/19/21 0824       Wound 03/19/21 Right;Lateral (Active)   Wound Image   03/19/21 0924   Wound Description Beefy red;Granulation tissue; Yellow;Slough 03/19/21 0823   Lavern-wound Assessment Clean;Dry; Intact 03/19/21 0823   Wound Length (cm) 4 5 cm 03/19/21 0823   Wound Width (cm) 8 5 cm 03/19/21 0823   Wound Depth (cm) 1 cm 03/19/21 0823   Wound Surface Area (cm^2) 38 25 cm^2 03/19/21 0823   Wound Volume (cm^3) 38 25 cm^3 03/19/21 0823   Calculated Wound Volume (cm^3) 38 25 cm^3 03/19/21 0823   Drainage Amount Moderate 03/19/21 0823   Drainage Description Serosanguineous 03/19/21 0823   Non-staged Wound Description Full thickness 03/19/21 0823       Wound 03/19/21 Foot Right;Posterior (Active)   Wound Image   03/19/21 0925   Wound Description Eschar;Slough; Yellow 03/19/21 0831   Lavern-wound Assessment Dry; Intact; Clean 03/19/21 0831   Wound Length (cm) 1 cm 03/19/21 0831   Wound Width (cm) 2 9 cm 03/19/21 0831   Wound Depth (cm) 0 cm 03/19/21 0831   Wound Surface Area (cm^2) 2 9 cm^2 03/19/21 0831   Wound Volume (cm^3) 0 cm^3 03/19/21 0831   Calculated Wound Volume (cm^3) 0 cm^3 03/19/21 0831   Drainage Amount Small 03/19/21 0831   Drainage Description Serous 03/19/21 0831   Non-staged Wound Description Full thickness 03/19/21 0831                         Diagnosis:  1  Diabetic ulcer of right 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, with fat layer exposed ([address] )  -     lidocaine (XYLOCAINE) 4 % topical solution 5 mL  -     Wound home care; Future  -     Wound cleansing and dressings; Future        Diagnosis ICD-10-CM Associated Orders   1   Diabetic ulcer of right 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, with fat layer exposed ([address] )  E11 621 lidocaine (XYLOCAINE) 4 % topical solution 5 mL    [id number] Wound home care     Wound cleansing and dressings ASSESSMENT    Problems:    Chronic illness / Problem not at goal with exacerbation, progression or side effects of treatment  1   Diabetic foot ulcer with exposed bone  [address]      PLAN    Discussion of Management /  Recommendations  I concur that he should proceed with free flap  Continue NPWT until flap  Continue strict NWB on foot pre and post-flap

## 2021-04-02 NOTE — LETTER
[address] [address]  Phone#  [phone]  Fax#  [phone]    Patient:  Montez Hassan  YOB: 1950  Phone:  [phone]  Date of Visit:  4/2/2021    Orders Placed This Encounter   Procedures    Wound home care     Continue with [facility] for wound care and dressing changes     Standing Status:   Future     Standing Expiration Date:   4/2/2022    Wound cleansing and dressings     Wound cleansing and dressings  Wash your hands with soap and water  Remove old dressing, discard into plastic bag and place in trash  Cleanse all the wounds with normal saline prior to applying clean dressings  Shower no  DO NOT WET DRESSING  Applied Silver Alginate today at the [facility] with Gauze, ABD Pad, Kerlix secured with tape until reapplied today at home with [facility]---  Battery to the Wound Vac is low----  Apply Wound Vac ASAP once home    Right Foot medial and Right Foot lateral wounds:  Wound Vac Settings:  Skin prep periwound prior to applying drape  Pressure: 125  Suction : Continuous  Sponge Color: Black  Change dressing 3x a week  Apply black foam to wounds, bridge appropriately  DO NOT USE WHITE FOAM  DO NOT USE ADAPTIC        Right Foot posterior wound:  Apply Silver Alginate, cover with gauze/abd pad secure with ava and tape  Change 3 x weekly and as needed    ----NO WEIGHT BEARING TO RIGHT FOOT---     Standing Status:   Future     Standing Expiration Date:   4/2/2022         Electronically signed by Polly Sawn DPM

## 2021-04-02 NOTE — PATIENT INSTRUCTIONS
Orders Placed This Encounter   Procedures    Wound home care     Continue with [facility] for wound care and dressing changes     Standing Status:   Future     Standing Expiration Date:   4/2/2022    Wound cleansing and dressings     Wound cleansing and dressings  Wash your hands with soap and water  Remove old dressing, discard into plastic bag and place in trash  Cleanse all the wounds with normal saline prior to applying clean dressings  Shower no  DO NOT WET DRESSING  Applied Silver Alginate today at the [facility] with Gauze, ABD Pad, Kerlix secured with tape until reapplied today at home with [facility]---  Battery to the Wound Vac is low----  Apply Wound Vac ASAP once home    Right Foot medial and Right Foot lateral wounds:  Wound Vac Settings:  Skin prep periwound prior to applying drape  Pressure: 125  Suction : Continuous  Sponge Color: Black  Change dressing 3x a week  Apply black foam to wounds, bridge appropriately  DO NOT USE WHITE FOAM  DO NOT USE ADAPTIC  Right Foot posterior wound:  Apply Silver Alginate, cover with gauze/abd pad secure with ava and tape  Change 3 x weekly and as needed    ----NO WEIGHT BEARING TO RIGHT FOOT---     Standing Status:   Future     Standing Expiration Date:   4/2/2022     Non Weight Bearing Activity   WHAT YOU NEED TO KNOW:   What is non weight bearing activity? Non weight bearing (NWB) activity is exercise or motion you can do without supporting your own weight  For example, your weight is supported by water when you swim  NWB activities will not cause impact or strain  You can increase your strength, flexibility, cardiovascular health, and balance with NWB activities  You can also prevent muscles from becoming tight or short after an injury  You may only need to do NWB activities until an injury heals, or you may need to continue if you have a long-term condition  Your healthcare provider will tell you how long to continue doing NWB activities    Why may I need NWB activities? · Rest for one or both of your legs or knees after an injury or surgery    · Weak or brittle bones that would break if you put too much weight on them    · Joint pain from being overweight or from arthritis makes weight bearing exercise painful    · Elderly age    · Limited mobility in your legs    · Lack of cartilage in one or both knees    · Diabetes complications such as neuropathy increase your risk for falls and make walking difficult    · Long-term low back pain    What are some examples of NWB activities? · Swimming, water aerobics, or rowing    · Riding a bicycle or using a stationary bicycle    · Lifting weights or using resistance bands while seated    · Using a hand bike to work only your upper body    · Range of motion exercises for joint flexibility    · Isometric exercises that tighten and relax muscles several times in a row    · Certain yoga poses that do not need you to support your weight    Why do I need to go to physical therapy? A physical therapist can teach you NWB activities that will build muscle strength and improve your balance  He will show you how to prevent injury and decrease your risk for falls during NWB activity  For example, he can show you how to use assistive devices such as crutches to help support your weight  He will also help you plan NWB activities that will raise your heart rate for cardiovascular exercise  Adults should try to get at least 150 minutes of cardiovascular exercise each week  What do I need to know about safety? · Stop if you feel pain  Do not move joints beyond their normal range of motion  Rest your joints during a flare if you have a disease such as arthritis  Do not lift heavy weights unless you can handle the weight easily  You may need to work up to using heavy weights  Talk to your healthcare provider if you feel pain during your activities  · Move slowly and smoothly  Do not make fast or jerky motions       · Use assistive devices as directed  Support your weight on crutches, a walker, or other devices as directed by your healthcare provider  · Do a variety of activities  The same activity every day may injure a muscle or joint  You may overtrain some muscles and not train others enough  · Talk to your healthcare provider about safe activities during pregnancy  You may not be able to lift weights while you are pregnant  Your healthcare provider can help you plan safe NWB activities during pregnancy  When should I contact my healthcare provider? · You have new or worsening pain during activity  · You have questions or concerns about your condition or care  CARE AGREEMENT:   You have the right to help plan your care  Learn about your health condition and how it may be treated  Discuss treatment options with your healthcare providers to decide what care you want to receive  You always have the right to refuse treatment  The above information is an  only  It is not intended as medical advice for individual conditions or treatments  Talk to your doctor, nurse or pharmacist before following any medical regimen to see if it is safe and effective for you  © Copyright [address] Information is for End User's use only and may not be sold, redistributed or otherwise used for commercial purposes   All illustrations and images included in CareNotes® are the copyrighted property of A D A M , Inc  or [address] [place] [address]

## 2021-04-02 NOTE — LETTER
[address] [address]  Phone#  [phone]  Fax#  [phone]    Patient:  Nano Cope  YOB: 1950  Phone:  [phone]  Date of Visit:  4/2/2021    Orders Placed This Encounter   Procedures    Wound cleansing and dressings     Wound cleansing and dressings  Wash your hands with soap and water  Remove old dressing, discard into plastic bag and place in trash  Cleanse all the wounds with normal saline prior to applying clean dressings  Shower no  DO NOT WET DRESSING  Right Foot medial and Right Foot lateral wounds:  Wound Vac Settings:  Skin prep periwound prior to applying drape  Pressure: 125  Suction : Continuous  Sponge Color: Black  Change dressing 3x a week  Apply black foam to wounds, bridge appropriately  DO NOT USE WHITE FOAM  DO NOT USE ADAPTIC        Right Foot posterior wound:  Apply Silver Alginate, cover with gauze/abd pad secure with ava and tape  Change 3 x weekly and as needed    ----NO WEIGHT BEARING TO RIGHT FOOT---     Standing Status:   Future     Standing Expiration Date:   4/2/2022    Wound home care     Continue with [facility] for wound care and dressing changes     Standing Status:   Future     Standing Expiration Date:   4/2/2022         Electronically signed by Christine Em DPM

## 2021-04-02 NOTE — TELEPHONE ENCOUNTER
Patient called and left message to return call  Called patient and he asked what medications he has to stop  He stated that he had spoken to someone  Ann Hall RN,  and she had not spoken with patient  Reviewed with Fely Sims medication and patient to stop Eliquis as directed by physician  Informed patient and will follow up on Monday  Patient stated understanding

## 2021-04-05 ENCOUNTER — DOCUMENTATION (OUTPATIENT)
Dept: FAMILY MEDICINE CLINIC | Facility: CLINIC | Age: 71
End: 2021-04-05

## 2021-04-06 NOTE — TELEPHONE ENCOUNTER
Per Mckenzie Gonzalez patient is to stop Lisinopril and Eliquis, patient verbalized that he was already informed and stopped taking both  medications

## 2021-04-07 ENCOUNTER — ANESTHESIA (OUTPATIENT)
Dept: PERIOP | Facility: HOSPITAL | Age: 71
DRG: 463 | End: 2021-04-07
Payer: COMMERCIAL

## 2021-04-07 ENCOUNTER — HOSPITAL ENCOUNTER (INPATIENT)
Facility: HOSPITAL | Age: 71
LOS: 23 days | DRG: 463 | End: 2021-04-30
Attending: PLASTIC SURGERY | Admitting: PLASTIC SURGERY
Payer: COMMERCIAL

## 2021-04-07 DIAGNOSIS — I10 ESSENTIAL HYPERTENSION: Chronic | ICD-10-CM

## 2021-04-07 DIAGNOSIS — G47.9 SLEEP DISTURBANCE: ICD-10-CM

## 2021-04-07 DIAGNOSIS — L97.412 DIABETIC ULCER OF RIGHT MIDFOOT ASSOCIATED WITH TYPE 2 DIABETES MELLITUS, WITH FAT LAYER EXPOSED (HCC): ICD-10-CM

## 2021-04-07 DIAGNOSIS — I48.91 ATRIAL FIBRILLATION WITH RVR (HCC): ICD-10-CM

## 2021-04-07 DIAGNOSIS — E11.621 DIABETIC ULCER OF RIGHT MIDFOOT ASSOCIATED WITH TYPE 2 DIABETES MELLITUS, WITH FAT LAYER EXPOSED (HCC): ICD-10-CM

## 2021-04-07 DIAGNOSIS — Z89.431 S/P TRANSMETATARSAL AMPUTATION OF FOOT, RIGHT (HCC): ICD-10-CM

## 2021-04-07 DIAGNOSIS — E11.40 TYPE 2 DIABETES MELLITUS WITH DIABETIC NEUROPATHY, WITHOUT LONG-TERM CURRENT USE OF INSULIN (HCC): Primary | Chronic | ICD-10-CM

## 2021-04-07 DIAGNOSIS — T81.32XD DEEP DISRUPTION OR DEHISCENCE OF OPERATION WOUND, SUBSEQUENT ENCOUNTER: ICD-10-CM

## 2021-04-07 LAB
ABO GROUP BLD: NORMAL
ABO GROUP BLD: NORMAL
ANION GAP SERPL CALCULATED.3IONS-SCNC: 5 MMOL/L (ref 4–13)
BLD GP AB SCN SERPL QL: NEGATIVE
BUN SERPL-MCNC: 16 MG/DL (ref 5–25)
CALCIUM SERPL-MCNC: 9 MG/DL (ref 8.3–10.1)
CHLORIDE SERPL-SCNC: 110 MMOL/L (ref 100–108)
CO2 SERPL-SCNC: 25 MMOL/L (ref 21–32)
CREAT SERPL-MCNC: 0.82 MG/DL (ref 0.6–1.3)
GFR SERPL CREATININE-BSD FRML MDRD: 90 ML/MIN/1.73SQ M
GLUCOSE SERPL-MCNC: 129 MG/DL (ref 65–140)
GLUCOSE SERPL-MCNC: 152 MG/DL (ref 65–140)
GLUCOSE SERPL-MCNC: 215 MG/DL (ref 65–140)
MAGNESIUM SERPL-MCNC: 2 MG/DL (ref 1.6–2.6)
PHOSPHATE SERPL-MCNC: 2.9 MG/DL (ref 2.3–4.1)
PLATELET # BLD AUTO: 264 THOUSANDS/UL (ref 149–390)
PMV BLD AUTO: 10.3 FL (ref 8.9–12.7)
POTASSIUM SERPL-SCNC: 4 MMOL/L (ref 3.5–5.3)
RH BLD: POSITIVE
RH BLD: POSITIVE
SODIUM SERPL-SCNC: 140 MMOL/L (ref 136–145)
SPECIMEN EXPIRATION DATE: NORMAL

## 2021-04-07 PROCEDURE — 0JBQ0ZZ EXCISION OF RIGHT FOOT SUBCUTANEOUS TISSUE AND FASCIA, OPEN APPROACH: ICD-10-PCS | Performed by: PLASTIC SURGERY

## 2021-04-07 PROCEDURE — 83735 ASSAY OF MAGNESIUM: CPT | Performed by: SURGERY

## 2021-04-07 PROCEDURE — 86900 BLOOD TYPING SEROLOGIC ABO: CPT | Performed by: PLASTIC SURGERY

## 2021-04-07 PROCEDURE — 86901 BLOOD TYPING SEROLOGIC RH(D): CPT | Performed by: PLASTIC SURGERY

## 2021-04-07 PROCEDURE — 15004 WOUND PREP F/N/HF/G: CPT | Performed by: PLASTIC SURGERY

## 2021-04-07 PROCEDURE — 97606 NEG PRS WND THER DME>50 SQCM: CPT | Performed by: PLASTIC SURGERY

## 2021-04-07 PROCEDURE — 82948 REAGENT STRIP/BLOOD GLUCOSE: CPT

## 2021-04-07 PROCEDURE — 99222 1ST HOSP IP/OBS MODERATE 55: CPT | Performed by: INTERNAL MEDICINE

## 2021-04-07 PROCEDURE — 80048 BASIC METABOLIC PNL TOTAL CA: CPT | Performed by: SURGERY

## 2021-04-07 PROCEDURE — 86850 RBC ANTIBODY SCREEN: CPT | Performed by: PLASTIC SURGERY

## 2021-04-07 PROCEDURE — 84100 ASSAY OF PHOSPHORUS: CPT | Performed by: SURGERY

## 2021-04-07 PROCEDURE — 85049 AUTOMATED PLATELET COUNT: CPT | Performed by: SURGERY

## 2021-04-07 RX ORDER — DILTIAZEM HYDROCHLORIDE 120 MG/1
120 CAPSULE, EXTENDED RELEASE ORAL ONCE
Status: COMPLETED | OUTPATIENT
Start: 2021-04-07 | End: 2021-04-07

## 2021-04-07 RX ORDER — GLIMEPIRIDE 2 MG/1
4 TABLET ORAL 2 TIMES DAILY
Status: DISCONTINUED | OUTPATIENT
Start: 2021-04-07 | End: 2021-04-09

## 2021-04-07 RX ORDER — KETAMINE HCL IN NACL, ISO-OSM 100MG/10ML
SYRINGE (ML) INJECTION AS NEEDED
Status: DISCONTINUED | OUTPATIENT
Start: 2021-04-07 | End: 2021-04-07

## 2021-04-07 RX ORDER — FENTANYL CITRATE 50 UG/ML
INJECTION, SOLUTION INTRAMUSCULAR; INTRAVENOUS AS NEEDED
Status: DISCONTINUED | OUTPATIENT
Start: 2021-04-07 | End: 2021-04-07

## 2021-04-07 RX ORDER — ONDANSETRON 2 MG/ML
INJECTION INTRAMUSCULAR; INTRAVENOUS AS NEEDED
Status: DISCONTINUED | OUTPATIENT
Start: 2021-04-07 | End: 2021-04-07

## 2021-04-07 RX ORDER — OXYCODONE HYDROCHLORIDE 5 MG/1
5 TABLET ORAL EVERY 4 HOURS PRN
Status: DISCONTINUED | OUTPATIENT
Start: 2021-04-07 | End: 2021-04-30 | Stop reason: HOSPADM

## 2021-04-07 RX ORDER — GABAPENTIN 300 MG/1
300 CAPSULE ORAL ONCE
Status: COMPLETED | OUTPATIENT
Start: 2021-04-07 | End: 2021-04-07

## 2021-04-07 RX ORDER — SODIUM CHLORIDE, SODIUM LACTATE, POTASSIUM CHLORIDE, CALCIUM CHLORIDE 600; 310; 30; 20 MG/100ML; MG/100ML; MG/100ML; MG/100ML
INJECTION, SOLUTION INTRAVENOUS CONTINUOUS PRN
Status: DISCONTINUED | OUTPATIENT
Start: 2021-04-07 | End: 2021-04-07

## 2021-04-07 RX ORDER — METOPROLOL TARTRATE 5 MG/5ML
INJECTION INTRAVENOUS AS NEEDED
Status: DISCONTINUED | OUTPATIENT
Start: 2021-04-07 | End: 2021-04-07

## 2021-04-07 RX ORDER — FENTANYL CITRATE/PF 50 MCG/ML
25 SYRINGE (ML) INJECTION
Status: DISCONTINUED | OUTPATIENT
Start: 2021-04-07 | End: 2021-04-07 | Stop reason: HOSPADM

## 2021-04-07 RX ORDER — DILTIAZEM HYDROCHLORIDE 90 MG/1
180 CAPSULE, EXTENDED RELEASE ORAL ONCE
Status: COMPLETED | OUTPATIENT
Start: 2021-04-07 | End: 2021-04-07

## 2021-04-07 RX ORDER — LISINOPRIL 20 MG/1
40 TABLET ORAL DAILY
Status: DISCONTINUED | OUTPATIENT
Start: 2021-04-08 | End: 2021-04-30 | Stop reason: HOSPADM

## 2021-04-07 RX ORDER — DOXYCYCLINE HYCLATE 100 MG/1
100 CAPSULE ORAL EVERY 12 HOURS SCHEDULED
Status: DISCONTINUED | OUTPATIENT
Start: 2021-04-07 | End: 2021-04-14

## 2021-04-07 RX ORDER — LIDOCAINE HYDROCHLORIDE 10 MG/ML
0.5 INJECTION, SOLUTION EPIDURAL; INFILTRATION; INTRACAUDAL; PERINEURAL ONCE AS NEEDED
Status: COMPLETED | OUTPATIENT
Start: 2021-04-07 | End: 2021-04-07

## 2021-04-07 RX ORDER — ROCURONIUM BROMIDE 10 MG/ML
INJECTION, SOLUTION INTRAVENOUS AS NEEDED
Status: DISCONTINUED | OUTPATIENT
Start: 2021-04-07 | End: 2021-04-07

## 2021-04-07 RX ORDER — LIDOCAINE HYDROCHLORIDE 10 MG/ML
INJECTION, SOLUTION EPIDURAL; INFILTRATION; INTRACAUDAL; PERINEURAL
Status: COMPLETED | OUTPATIENT
Start: 2021-04-07 | End: 2021-04-07

## 2021-04-07 RX ORDER — HYDROMORPHONE HCL IN WATER/PF 6 MG/30 ML
0.2 PATIENT CONTROLLED ANALGESIA SYRINGE INTRAVENOUS
Status: DISCONTINUED | OUTPATIENT
Start: 2021-04-07 | End: 2021-04-07 | Stop reason: HOSPADM

## 2021-04-07 RX ORDER — PROPOFOL 10 MG/ML
INJECTION, EMULSION INTRAVENOUS AS NEEDED
Status: DISCONTINUED | OUTPATIENT
Start: 2021-04-07 | End: 2021-04-07

## 2021-04-07 RX ORDER — ALBUMIN, HUMAN INJ 5% 5 %
SOLUTION INTRAVENOUS CONTINUOUS PRN
Status: DISCONTINUED | OUTPATIENT
Start: 2021-04-07 | End: 2021-04-07

## 2021-04-07 RX ORDER — SODIUM CHLORIDE, SODIUM LACTATE, POTASSIUM CHLORIDE, CALCIUM CHLORIDE 600; 310; 30; 20 MG/100ML; MG/100ML; MG/100ML; MG/100ML
50 INJECTION, SOLUTION INTRAVENOUS CONTINUOUS
Status: DISCONTINUED | OUTPATIENT
Start: 2021-04-07 | End: 2021-04-07

## 2021-04-07 RX ORDER — MIDAZOLAM HYDROCHLORIDE 2 MG/2ML
INJECTION, SOLUTION INTRAMUSCULAR; INTRAVENOUS AS NEEDED
Status: DISCONTINUED | OUTPATIENT
Start: 2021-04-07 | End: 2021-04-07

## 2021-04-07 RX ORDER — OXYCODONE HYDROCHLORIDE 10 MG/1
10 TABLET ORAL EVERY 4 HOURS PRN
Status: DISCONTINUED | OUTPATIENT
Start: 2021-04-07 | End: 2021-04-30 | Stop reason: HOSPADM

## 2021-04-07 RX ORDER — ATORVASTATIN CALCIUM 40 MG/1
40 TABLET, FILM COATED ORAL
Status: DISCONTINUED | OUTPATIENT
Start: 2021-04-08 | End: 2021-04-30 | Stop reason: HOSPADM

## 2021-04-07 RX ORDER — CEFAZOLIN SODIUM 2 G/50ML
2000 SOLUTION INTRAVENOUS ONCE
Status: COMPLETED | OUTPATIENT
Start: 2021-04-07 | End: 2021-04-07

## 2021-04-07 RX ORDER — ONDANSETRON 2 MG/ML
4 INJECTION INTRAMUSCULAR; INTRAVENOUS ONCE AS NEEDED
Status: DISCONTINUED | OUTPATIENT
Start: 2021-04-07 | End: 2021-04-07 | Stop reason: HOSPADM

## 2021-04-07 RX ORDER — ACETAMINOPHEN 325 MG/1
975 TABLET ORAL ONCE
Status: COMPLETED | OUTPATIENT
Start: 2021-04-07 | End: 2021-04-07

## 2021-04-07 RX ORDER — LIDOCAINE HYDROCHLORIDE 10 MG/ML
INJECTION, SOLUTION EPIDURAL; INFILTRATION; INTRACAUDAL; PERINEURAL AS NEEDED
Status: DISCONTINUED | OUTPATIENT
Start: 2021-04-07 | End: 2021-04-07

## 2021-04-07 RX ORDER — DILTIAZEM HYDROCHLORIDE 60 MG/1
120 TABLET, FILM COATED ORAL 2 TIMES DAILY
Status: DISCONTINUED | OUTPATIENT
Start: 2021-04-07 | End: 2021-04-07

## 2021-04-07 RX ORDER — SODIUM CHLORIDE, SODIUM LACTATE, POTASSIUM CHLORIDE, CALCIUM CHLORIDE 600; 310; 30; 20 MG/100ML; MG/100ML; MG/100ML; MG/100ML
125 INJECTION, SOLUTION INTRAVENOUS CONTINUOUS
Status: DISCONTINUED | OUTPATIENT
Start: 2021-04-07 | End: 2021-04-07

## 2021-04-07 RX ORDER — MAGNESIUM HYDROXIDE 1200 MG/15ML
LIQUID ORAL AS NEEDED
Status: DISCONTINUED | OUTPATIENT
Start: 2021-04-07 | End: 2021-04-07 | Stop reason: HOSPADM

## 2021-04-07 RX ORDER — ACETAMINOPHEN 325 MG/1
650 TABLET ORAL EVERY 4 HOURS PRN
Status: DISCONTINUED | OUTPATIENT
Start: 2021-04-07 | End: 2021-04-30 | Stop reason: HOSPADM

## 2021-04-07 RX ORDER — DILTIAZEM HYDROCHLORIDE 60 MG/1
120 TABLET, FILM COATED ORAL 2 TIMES DAILY
Status: DISCONTINUED | OUTPATIENT
Start: 2021-04-08 | End: 2021-04-09

## 2021-04-07 RX ORDER — DIPHENHYDRAMINE HYDROCHLORIDE 50 MG/ML
12.5 INJECTION INTRAMUSCULAR; INTRAVENOUS ONCE AS NEEDED
Status: DISCONTINUED | OUTPATIENT
Start: 2021-04-07 | End: 2021-04-07 | Stop reason: HOSPADM

## 2021-04-07 RX ADMIN — PROPOFOL 150 MG: 10 INJECTION, EMULSION INTRAVENOUS at 13:21

## 2021-04-07 RX ADMIN — SITAGLIPTIN 100 MG: 100 TABLET, FILM COATED ORAL at 18:05

## 2021-04-07 RX ADMIN — DILTIAZEM HYDROCHLORIDE 180 MG: 90 CAPSULE, EXTENDED RELEASE ORAL at 18:06

## 2021-04-07 RX ADMIN — EPHEDRINE SULFATE 10 MG: 50 INJECTION, SOLUTION INTRAVENOUS at 13:20

## 2021-04-07 RX ADMIN — MIDAZOLAM 2 MG: 1 INJECTION INTRAMUSCULAR; INTRAVENOUS at 12:36

## 2021-04-07 RX ADMIN — PHENYLEPHRINE HYDROCHLORIDE 200 MCG: 10 INJECTION INTRAVENOUS at 13:19

## 2021-04-07 RX ADMIN — ALBUMIN (HUMAN): 12.5 INJECTION, SOLUTION INTRAVENOUS at 13:41

## 2021-04-07 RX ADMIN — LIDOCAINE HYDROCHLORIDE 0.5 ML: 10 INJECTION, SOLUTION EPIDURAL; INFILTRATION; INTRACAUDAL; PERINEURAL at 12:53

## 2021-04-07 RX ADMIN — FENTANYL CITRATE 25 MCG: 50 INJECTION INTRAMUSCULAR; INTRAVENOUS at 13:44

## 2021-04-07 RX ADMIN — PHENYLEPHRINE HYDROCHLORIDE 150 MCG: 10 INJECTION INTRAVENOUS at 13:58

## 2021-04-07 RX ADMIN — SODIUM CHLORIDE, SODIUM LACTATE, POTASSIUM CHLORIDE, AND CALCIUM CHLORIDE 125 ML/HR: .6; .31; .03; .02 INJECTION, SOLUTION INTRAVENOUS at 12:22

## 2021-04-07 RX ADMIN — FENTANYL CITRATE 50 MCG: 50 INJECTION INTRAMUSCULAR; INTRAVENOUS at 12:43

## 2021-04-07 RX ADMIN — SODIUM CHLORIDE, SODIUM LACTATE, POTASSIUM CHLORIDE, AND CALCIUM CHLORIDE: .6; .31; .03; .02 INJECTION, SOLUTION INTRAVENOUS at 12:36

## 2021-04-07 RX ADMIN — LIDOCAINE HYDROCHLORIDE 50 MG: 10 INJECTION, SOLUTION EPIDURAL; INFILTRATION; INTRACAUDAL; PERINEURAL at 12:42

## 2021-04-07 RX ADMIN — METOROPROLOL TARTRATE 1 MG: 5 INJECTION, SOLUTION INTRAVENOUS at 12:55

## 2021-04-07 RX ADMIN — INSULIN HUMAN 5 UNITS: 100 INJECTION, SOLUTION PARENTERAL at 12:12

## 2021-04-07 RX ADMIN — ENOXAPARIN SODIUM 105 MG: 120 INJECTION SUBCUTANEOUS at 20:28

## 2021-04-07 RX ADMIN — PHENYLEPHRINE HYDROCHLORIDE 150 MCG: 10 INJECTION INTRAVENOUS at 13:50

## 2021-04-07 RX ADMIN — METOROPROLOL TARTRATE 1 MG: 5 INJECTION, SOLUTION INTRAVENOUS at 12:45

## 2021-04-07 RX ADMIN — METOROPROLOL TARTRATE 1 MG: 5 INJECTION, SOLUTION INTRAVENOUS at 12:43

## 2021-04-07 RX ADMIN — DOXYCYCLINE 100 MG: 100 CAPSULE ORAL at 20:28

## 2021-04-07 RX ADMIN — ONDANSETRON 4 MG: 2 INJECTION INTRAMUSCULAR; INTRAVENOUS at 14:13

## 2021-04-07 RX ADMIN — PHENYLEPHRINE HYDROCHLORIDE 100 MCG: 10 INJECTION INTRAVENOUS at 13:26

## 2021-04-07 RX ADMIN — LIDOCAINE HYDROCHLORIDE 0.2 ML: 10 INJECTION, SOLUTION EPIDURAL; INFILTRATION; INTRACAUDAL; PERINEURAL at 12:22

## 2021-04-07 RX ADMIN — ACETAMINOPHEN 975 MG: 325 TABLET, FILM COATED ORAL at 11:37

## 2021-04-07 RX ADMIN — SUGAMMADEX 200 MG: 100 INJECTION, SOLUTION INTRAVENOUS at 14:13

## 2021-04-07 RX ADMIN — METOROPROLOL TARTRATE 1 MG: 5 INJECTION, SOLUTION INTRAVENOUS at 12:47

## 2021-04-07 RX ADMIN — ALBUMIN (HUMAN): 12.5 INJECTION, SOLUTION INTRAVENOUS at 13:58

## 2021-04-07 RX ADMIN — GABAPENTIN 300 MG: 300 CAPSULE ORAL at 11:37

## 2021-04-07 RX ADMIN — CEFAZOLIN SODIUM 2000 MG: 2 SOLUTION INTRAVENOUS at 12:40

## 2021-04-07 RX ADMIN — GLIMEPIRIDE 4 MG: 2 TABLET ORAL at 20:28

## 2021-04-07 RX ADMIN — DILTIAZEM HYDROCHLORIDE 120 MG: 120 CAPSULE, EXTENDED RELEASE ORAL at 12:31

## 2021-04-07 RX ADMIN — ROCURONIUM BROMIDE 30 MG: 50 INJECTION, SOLUTION INTRAVENOUS at 13:21

## 2021-04-07 RX ADMIN — ALBUMIN (HUMAN): 12.5 INJECTION, SOLUTION INTRAVENOUS at 12:42

## 2021-04-07 RX ADMIN — Medication 30 MG: at 13:21

## 2021-04-07 RX ADMIN — METOROPROLOL TARTRATE 1 MG: 5 INJECTION, SOLUTION INTRAVENOUS at 12:51

## 2021-04-07 RX ADMIN — PHENYLEPHRINE HYDROCHLORIDE 30 MCG/MIN: 10 INJECTION INTRAVENOUS at 13:26

## 2021-04-07 NOTE — ANESTHESIA POSTPROCEDURE EVALUATION
Post-Op Assessment Note    CV Status:  Stable  Pain Score: 0    Pain management: adequate     Mental Status:  Alert and awake   Hydration Status:  Euvolemic   PONV Controlled:  Controlled   Airway Patency:  Patent      Post Op Vitals Reviewed: Yes      Staff: CRNA   Comments: Pt awake, alert, able to maintain own airway, VSS- Afib controlled, BP stable at this time, report to recovery RN        No complications documented      /78 (04/07/21 1430)    Temp 98 5 °F (36 9 °C) (04/07/21 1430)    Pulse 108   Resp   16   SpO2   100%

## 2021-04-07 NOTE — INTERIM OP NOTE
DEBRIDEMENT RIGHT FOOT  Postoperative Note  PATIENT NAME: Cassia Mckeon  : 1950  MRN: [id number]  [facility] OR ROOM 15    Surgery Date: 2021    Preop Diagnosis:  Acquired absence of other right toe(s) ([facility] ) [Z01 081]    Post-Op Diagnosis Codes:     * Acquired absence of other right toe(s) ([facility] ) [Z89 421]    Procedure(s) (LRB):  DEBRIDEMENT RIGHT FOOT (Right)    Surgeon(s) and Role:     * Lorrie Marrero MD - Primary     * Jacinda Payne PA-C - Assisting    Specimens:  * No specimens in log *    Estimated Blood Loss:   Minimal    Anesthesia Type:   General     Findings:    Fibrinous debris in both medial and lateral aspects of foot     Free flap reconstruction aborted due to atrial fibrillation with RVR and hypotension    Complications:   None    SIGNATURE: Bethel Lau MD   DATE: 2021   TIME: 3:07 PM

## 2021-04-07 NOTE — CONSULTS
CARDIOLOGY HISTORY AND PHYSICAL  OR POOL     NAME: Shabbir Rico  AGE: 73 y o  SEX: male  : 1950   MRN: [id number]  ENCOUNTER: [id number]    DATE: 2021  TIME: 4:03 PM    Primary Care Physician: Elaina Wetzel MD  Admitting Provider: Elvie Bustamante MD      Reason for consult: Atrial fibrillation with RVR      Assessment and Plan:  Atrial fibrillation with RVR  Pt was reported to be in Afib with RVR and hypotensive during the R foot debridement and skin flap reconstruction today  Procedure was aborted  Home medications include cardizem 120mg BID and Eliquis 5mg BID, for which he is usually compliant; however, he missed 1-2 doses of his cardizem prior to the procedure  He stopped his Eliquis 5 days prior as instructed by his PCP  · Echo 2018 - EF 50%, no regional wall abnormalities, mildly dilated LV, RA  · Prior EKGs reviewed - chronic afib  · CHADS-VASc score 2  · Recommend one increased dose of cardizem 180mg tonight, with return to usual dose 120mg BID starting tomorrow morning  Prior to attempting R foot surgery again, the patient only needs to hold his Eliquis 2-days before  · Other medications include Lipitor 40mg qd, lisinopril 40mg qd    Hyperlipidemia  · Currently on Lipitor 40mg qd  · Lipid profile 2018 - Total 140, , HDL 38, LDL 72        Chief complaint: "My afib"    History of Present Illness     Shabbir Rico  is a 73 y o  male with PMHx of atrial fibrillation on cardizem/Eliquis, DM Type 2, and HLD who was admitted for right foot excisional debridement and free flap reconstruction; however, the procedure was aborted due to atrial fibrillation with RVR and hypotension  The patient does not have a cardiologist and his medications are managed by his family doctor  He takes cardizem 120mg BID and Eliquis 5mg BID and is usually compliant with his medications   As instructed by his PCP, he stopped taking his Eliquis 5 days before the procedure; however, he mistakenly missed 1-2 doses of his Cardizem prior to today  Due to his history of diabetic ulcers, he has been limited to his walker/wheelchair since September/October of last year  He is unable to walk on stairs and when outside, relies on his wife to guide him on the wheelchair  The past few weeks, he's been relying more on his wheelchair  He denies ever having any anginal symptoms including chest pain, shortness of breath, orthopnea, palpitations, lower extremity edema  He admits his pressures do get high sometimes, but only when he gets anxious  He denies having any prior history of blood clots, strokes  During examination, the patient was lying comfortably in bed and his rate was controlled in the 90s  He again denied any chest pain, SOB, palpitations  Review of Systems   Review of Systems   Constitutional: Negative for chills, diaphoresis and fatigue  HENT: Negative  Eyes: Negative for visual disturbance  Respiratory: Negative for chest tightness and shortness of breath  Cardiovascular: Negative for chest pain, palpitations and leg swelling  Gastrointestinal: Negative for abdominal pain  Genitourinary: Negative  Musculoskeletal: Negative  Neurological: Negative for weakness  Psychiatric/Behavioral: Negative for agitation  Physical Exam  Constitutional:       General: He is not in acute distress  Appearance: Normal appearance  He is not ill-appearing  HENT:      Head: Normocephalic and atraumatic  Nose: Nose normal       Mouth/Throat:      Mouth: Mucous membranes are dry  Pharynx: Oropharynx is clear  Eyes:      Extraocular Movements: Extraocular movements intact  Conjunctiva/sclera: Conjunctivae normal       Pupils: Pupils are equal, round, and reactive to light  Neck:      Musculoskeletal: Normal range of motion and neck supple  Cardiovascular:      Rate and Rhythm: Normal rate  Rhythm irregular  Pulses: Normal pulses  Heart sounds: No murmur     Pulmonary: Effort: Pulmonary effort is normal  No respiratory distress  Breath sounds: Normal breath sounds  Abdominal:      General: Abdomen is flat  Bowel sounds are normal  There is no distension  Palpations: Abdomen is soft  Tenderness: There is no abdominal tenderness  Musculoskeletal:      Right lower leg: No edema  Left lower leg: No edema  Comments: R foot wrapped   Skin:     General: Skin is warm and dry  Capillary Refill: Capillary refill takes less than 2 seconds  Neurological:      General: No focal deficit present  Mental Status: He is alert and oriented to person, place, and time     Psychiatric:         Mood and Affect: Mood normal          Behavior: Behavior normal        Past Medical History     Past Medical History:   Diagnosis Date    Arthritis     Atrial fibrillation ([address] )     Diagnosed 11/2018    Benign prostate hyperplasia 04/2002    Cellulitis     right lower leg     Colon polyp 2006    Diabetes mellitus ([address] )     Hearing aid worn     bilat    Hearing loss     90% loss left ear and 40% right ear    History of clubfoot     "since birth"    History of pneumonia     History of TIA (transient ischemic attack) 04/25/2017 11/30/18 pt denies    Hyperlipidemia 5/15/2014    Hypertension     Infectious viral hepatitis     "cant remember type"    Irregular heart beat     Neuropathy     both feet    Osteomyelitis ([address] )     right great toe    Right middle lobe pulmonary nodule 11/6/2018    Seasickness     Teeth missing     Type 2 diabetes mellitus with diabetic neuropathy ([address] ) 11/6/2018    Wears glasses     reading    Wound, open     bottom of right foot       Past Surgical History     Past Surgical History:   Procedure Laterality Date    BUNIONECTOMY Right 12/4/2018    Procedure: 5TH METATARSAL BONE PARTIAL RESECTION, FULL THICKNESS DEBRIDEMENT OF DIABETIC ULCER;  Surgeon: Missy Galeano DPM;  [facility];  Service: Podiatry    CLUB FOOT RELEASE Bilateral     COLONOSCOPY      EXTERNAL FIXATOR APPLICATION Right 9/97/1023    Procedure: Application multiplane external fixation;  Surgeon: Silvia Palomino DPM;  [facility];  Service: Podiatry    FOOT HARDWARE REMOVAL Right 2/17/2021    Procedure: REMOVAL EXTERNAL FIXATOR;  Surgeon: Silvia Palomino DPM;  [facility];  Service: Podiatry    INCISION AND DRAINAGE OF WOUND Right 2/17/2021    Procedure: INCISION AND DRAINAGE (I&D) EXTREMITY;  Surgeon: Silvia Palomino DPM;  Location[facility];  Service: Podiatry    ORIF FOOT FRACTURE Right 3/4/2021    Procedure: VAC PLACEMENT; SCREW FIXATION RIGHT FOOT FUSION;  Surgeon: Silvia Palomino DPM;  Location[facility];  Service: Podiatry    [address] [place] [address] Right 2/22/2021    Procedure: AMPUTATION TRANSMETATARSAL (TMA), REMOVAL NAIL IM T2 ICF HARDWARE;  Surgeon: Silvia Palomino DPM;  Location[facility];  Service: Podiatry    CO FUSION FOOT BONES,MIDTARSAL,OSTEOTMY Right 2/12/2021    Procedure: foot ARTHRODESIS/FUSION;  Surgeon: Silvia Palomino DPM;  [facility];  Service: Podiatry    CO LENGTH/SHORT LEG/ANKL TENDON,SINGLE Right 2/12/2021    Procedure: LENGTHEN TIBIAL TENDON, TRANS HEEL CORD;  Surgeon: Silvia Palomino DPM;  [facility];  Service: Podiatry    CO PART [address] BONE METATARSAL HEAD,EA Right 12/29/2020    Procedure: EXCISION EXOSTOSIS;  Surgeon: Silvia Palomino DPM;  [facility];  Service: Giovanny Farooq WND EXTEN/COMPLIC Right 63/10/1644    Procedure: PRIMARY DELAYED CLOSURE;  Surgeon: Silvia Palomino DPM;  [facility];  Service: Podiatry    TOE AMPUTATION Right     partial great toe    TONSILLECTOMY      VAC DRESSING APPLICATION Right 7/1/6864    Procedure: APPLICATION VAC DRESSING EXTREMITY;  Surgeon: Loren Austin MD;  [facility];  Service: Plastics    [address] DEBRIDEMENT Right 3/1/2021    Procedure: DEBRIDEMENT LOWER EXTREMITY [facility] OUT); Surgeon: Kely Pop MD;  Location: [facility];  Service: Plastics       Social History     Social History     Substance and Sexual Activity   Alcohol Use Yes    Frequency: 2-4 times a month    Drinks per session: 1 or 2    Comment: socially     Social History     Substance and Sexual Activity   Drug Use Not Currently     Social History     Tobacco Use   Smoking Status Former Smoker    Types: Cigarettes    Quit date: 1988    Years since quittin 4   Smokeless Tobacco Former User   Tobacco Comment    exposure to passive smoke       Family History     Family History   Problem Relation Age of Onset    Diabetes Father         mellitus       Medications Prior to Admission     Prior to Admission medications    Medication Sig Start Date End Date Taking?  Authorizing Provider   Acetaminophen (TYLENOL ARTHRITIS PAIN PO) Take 650 mg by mouth 2 (two) times a day   Yes Historical Provider, MD   atorvastatin (LIPITOR) 40 mg tablet Take 1 tablet (40 mg total) by mouth daily 20  Yes Sabi Lobato MD   diltiazem (CARDIZEM) 120 MG tablet Take 1 tablet (120 mg total) by mouth 2 (two) times a day 20  Yes Sabi Lobato MD   doxycycline hyclate (VIBRAMYCIN) 100 mg capsule Take 1 capsule (100 mg total) by mouth every 12 (twelve) hours 3/10/21 4/15/21 Yes Elva Wu DPM   Eliquis 5 MG Take 1 tablet (5 mg total) by mouth 2 (two) times a day 20  Yes Sabi Lobato MD   glimepiride (AMARYL) 4 mg tablet Take one tablet by mouth twice daily  Patient taking differently: Take 4 mg by mouth 2 (two) times a day Take one tablet by mouth twice daily 20  Yes Sabi Lobato MD   Januvia 100 MG tablet Take 1 tablet (100 mg total) by mouth daily 20  Yes Sabi Lobato MD   lisinopril (ZESTRIL) 40 mg tablet Take 1 tablet (40 mg total) by mouth daily 20  Yes Sabi Lobato MD   metFORMIN (GLUCOPHAGE) 500 mg tablet Take 500 mg by mouth 2 (two) times a day with meals   Yes Historical Provider, MD   Blood Glucose Monitoring Suppl (ACCU-CHEK JYOTI SMARTVIEW) w/Device KIT use to check Blood Sugar as needed 3/14/16   Historical Provider, MD   glucose blood (Accu-Chek SmartView) test strip Use as instructed BID 2/13/21   Dale Goddard DPM       Allergies     Allergies   Allergen Reactions    Simvastatin Myalgia    Itraconazole Other (See Comments)     Pt denies knowledge of allergy           Objective     Vitals:    04/07/21 1430 04/07/21 1445 04/07/21 1500 04/07/21 1515   BP: 124/78 109/79 116/68 137/72   Pulse: (!) 125 92 88 92   Resp: 14 13 13 13   Temp: 98 5 °F (36 9 °C)      TempSrc:       SpO2: 100% 99% 99% 100%   Weight:       Height:         Body mass index is 28 22 kg/m²  Intake/Output Summary (Last 24 hours) at 4/7/2021 1603  Last data filed at 4/7/2021 1422  Gross per 24 hour   Intake 1550 ml   Output 10 ml   Net 1540 ml     Invasive Devices     Peripheral Intravenous Line            Peripheral IV 04/07/21 Left Wrist less than 1 day          Arterial Line            Arterial Line 04/07/21 Right Radial less than 1 day          Drain            Negative Pressure Wound Therapy (V A C ) Foot less than 1 day              Lab Results: I have personally reviewed pertinent reports  CBC:   Results from last 7 days   Lab Units 04/07/21  1613   MPV fL 10 3   PLATELETS Thousands/uL 264   , Chemistry Profile:   Results from last 7 days   Lab Units 04/07/21  1613   POTASSIUM mmol/L 4 0   CHLORIDE mmol/L 110*   CO2 mmol/L 25   BUN mg/dL 16   CREATININE mg/dL 0 82   CALCIUM mg/dL 9 0   EGFR ml/min/1 73sq m 90   , Last A1C/Lipid Panel/Thyroid Panel:   Lab Results   Component Value Date    HGBA1C 7 9 (H) 03/17/2021    HGBA1C 7 9 (H) 03/16/2021    TRIG 148 11/08/2018    HDL 38 (L) 11/08/2018    LDLCALC 72 11/08/2018    GCH9EIPLDUCQ 1 350 08/13/2019       Imaging: I have personally reviewed pertinent films in PACS  No results found      Microbiology: n/a    Urinalysis:       Invalid input(s): URIBILINOGEN     Urine Micro:        EKG, Pathology, and Other Studies: I have personally reviewed pertinent reports        Medications given in Emergency Department     Medication Administration - last 24 hours from 04/06/2021 1603 to 04/07/2021 1603       Date/Time Order Dose Route Action Action by     04/07/2021 1137 acetaminophen (TYLENOL) tablet 975 mg 975 mg Oral Given Chang Mayo RN     04/07/2021 1137 gabapentin (NEURONTIN) capsule 300 mg 300 mg Oral Given Chang Mayo RN     04/07/2021 1222 lidocaine (PF) (XYLOCAINE-MPF) 1 % injection 0 5 mL 0 2 mL Infiltration Given Chang Mayo RN     04/07/2021 1430 lactated ringers infusion 125 mL/hr Intravenous Continued from Richmond Ann RN     04/07/2021 1222 lactated ringers infusion 125 mL/hr Intravenous [address] Chang Mayo RN     04/07/2021 1231 diltiazem (CARDIZEM SR) 12 hr capsule 120 mg 120 mg Oral Given Chang Mayo RN     04/07/2021 1212 insulin regular (HumuLIN R,NovoLIN R) injection 5 Units 5 Units Subcutaneous Given Chang Mayo RN          Assessment and Plan     Problem List     * (Principal) Atrial fibrillation with RVR ([facility] )    Overview Signed 12/3/2018  8:55 AM by Kath Xiong MD     Initial episode during hosp for cellulitis : CHADS = 3 : NOAC started          Hyperlipidemia (Chronic)    Type 2 diabetes mellitus with diabetic neuropathy (HCC) (Chronic)    Lab Results   Component Value Date    HGBA1C 7 9 (H) 03/17/2021       Recent Labs     04/07/21  1121 04/07/21  1437   POCGLU 215* 152*       Blood Sugar Average: Last 72 hrs:  (P) 183 5        Right middle lobe pulmonary nodule    Overview Addendum 1/3/2020  3:16 PM by Kath Xiong MD     Incidental finding 11/08 : 7 mm : re ck in one yr     asx     No chng per 9/18          Essential hypertension (Chronic)    Acquired absence of other right toe(s) ([facility] )    Benign prostatic hyperplasia with lower urinary tract symptoms (Chronic) Positive for microalbuminuria (Chronic)    Overview Signed 7/9/2020  4:02 PM by Chari Castro MD     2019 X 1          Deep disruption or dehiscence of operation wound    Post-operative state    Aftercare for amputation stump    Ambulatory dysfunction    Diabetic ulcer of right 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, with fat layer exposed ([facility] )    Lab Results   Component Value Date    HGBA1C 7 9 (H) 03/17/2021       Recent Labs     04/07/21  1121 04/07/21  1437   POCGLU 215* 152*       Blood Sugar Average: Last 72 hrs:  (P) 183 5        S/P transmetatarsal amputation of foot, right (HCC)          Code Status: Level 1 - Full Code  VTE Pharmacologic Prophylaxis: Enoxaparin (Lovenox)   VTE Mechanical Prophylaxis: sequential compression device  Admission Status: INPATIENT     Admission Time  I spent 20 minutes admitting the patient  This involved direct patient contact where I performed a full history and physical, reviewing previous records, and reviewing laboratory and other diagnostic studies      Cecilio Cardozo  MS4

## 2021-04-07 NOTE — INTERVAL H&P NOTE
H&P reviewed  After examining the patient I find no changes in the patients condition since the H&P had been written      Vitals:    04/07/21 1117   BP: 126/72   Pulse: 98   Resp: 18   Temp: 98 9 °F (37 2 °C)   SpO2: 97%

## 2021-04-07 NOTE — ANESTHESIA PREPROCEDURE EVALUATION
Procedure:  DEBRIDEMENT RIGHT FOOT (Right Foot)  RECONSTRUCTION W/ FREE FASCIOCUTANEOUS FLAP RIGHT FOOT (Right Foot)    EF 55%, no significant valvular disorders    Patient d/c their Diltizem on 4/5/21 due to misunderstanding or miscommunication regarding their pre-operative meds    Relevant Problems   CARDIO   (+) Atrial fibrillation with RVR (HCC)   (+) Essential hypertension   (+) Hyperlipidemia      ENDO   (+) Type 2 diabetes mellitus with diabetic neuropathy (HCC)      /RENAL   (+) Benign prostatic hyperplasia with lower urinary tract symptoms      NEURO/PSYCH   (+) Type 2 diabetes mellitus with diabetic neuropathy (HCC)        Physical Exam    Airway    Mallampati score: II  TM Distance: >3 FB  Neck ROM: full     Dental   No notable dental hx     Cardiovascular  Rhythm: irregular, Rate: abnormal, Cardiovascular exam normal    Pulmonary  Pulmonary exam normal Breath sounds clear to auscultation, Wheezes:   ,     Other Findings  Patient tachycardiac with an irregular rhythm      Anesthesia Plan  ASA Score- 3     Anesthesia Type- general with ASA Monitors  Additional Monitors: arterial line  Airway Plan: ETT  Comment: Risks/benefits and alternatives discussed with patient including likely possibility of PONV and sore throat, as well as the rare possibilities of aspiration, dental/oropharyngeal/ocular injuries, or grave/life threatening anesthetic and surgical emergencies          Plan Factors-Exercise tolerance (METS): <4 METS  Patient summary reviewed  Patient instructed to abstain from smoking on day of procedure  Patient did not smoke on day of surgery  Induction- intravenous  Postoperative Plan- Plan for postoperative opioid use  Planned trial extubation    Informed Consent- Anesthetic plan and risks discussed with patient  I personally reviewed this patient with the CRNA  Discussed and agreed on the Anesthesia Plan with the CRNA  Kurt Almaraz

## 2021-04-08 LAB
ANION GAP SERPL CALCULATED.3IONS-SCNC: 4 MMOL/L (ref 4–13)
BUN SERPL-MCNC: 13 MG/DL (ref 5–25)
CALCIUM SERPL-MCNC: 9.3 MG/DL (ref 8.3–10.1)
CHLORIDE SERPL-SCNC: 109 MMOL/L (ref 100–108)
CO2 SERPL-SCNC: 28 MMOL/L (ref 21–32)
CREAT SERPL-MCNC: 0.9 MG/DL (ref 0.6–1.3)
ERYTHROCYTE [DISTWIDTH] IN BLOOD BY AUTOMATED COUNT: 15.9 % (ref 11.6–15.1)
GFR SERPL CREATININE-BSD FRML MDRD: 87 ML/MIN/1.73SQ M
GLUCOSE SERPL-MCNC: 114 MG/DL (ref 65–140)
HCT VFR BLD AUTO: 37.4 % (ref 36.5–49.3)
HGB BLD-MCNC: 11.6 G/DL (ref 12–17)
MAGNESIUM SERPL-MCNC: 2.1 MG/DL (ref 1.6–2.6)
MCH RBC QN AUTO: 30.1 PG (ref 26.8–34.3)
MCHC RBC AUTO-ENTMCNC: 31 G/DL (ref 31.4–37.4)
MCV RBC AUTO: 97 FL (ref 82–98)
PLATELET # BLD AUTO: 267 THOUSANDS/UL (ref 149–390)
PMV BLD AUTO: 10.4 FL (ref 8.9–12.7)
POTASSIUM SERPL-SCNC: 3.8 MMOL/L (ref 3.5–5.3)
RBC # BLD AUTO: 3.86 MILLION/UL (ref 3.88–5.62)
SODIUM SERPL-SCNC: 141 MMOL/L (ref 136–145)
WBC # BLD AUTO: 7.55 THOUSAND/UL (ref 4.31–10.16)

## 2021-04-08 PROCEDURE — 99222 1ST HOSP IP/OBS MODERATE 55: CPT | Performed by: PODIATRIST

## 2021-04-08 PROCEDURE — 99232 SBSQ HOSP IP/OBS MODERATE 35: CPT | Performed by: INTERNAL MEDICINE

## 2021-04-08 PROCEDURE — 85027 COMPLETE CBC AUTOMATED: CPT | Performed by: SURGERY

## 2021-04-08 PROCEDURE — 99232 SBSQ HOSP IP/OBS MODERATE 35: CPT | Performed by: PLASTIC SURGERY

## 2021-04-08 PROCEDURE — 80048 BASIC METABOLIC PNL TOTAL CA: CPT | Performed by: SURGERY

## 2021-04-08 PROCEDURE — 83735 ASSAY OF MAGNESIUM: CPT | Performed by: SURGERY

## 2021-04-08 RX ADMIN — ATORVASTATIN CALCIUM 40 MG: 40 TABLET, FILM COATED ORAL at 17:18

## 2021-04-08 RX ADMIN — DOXYCYCLINE 100 MG: 100 CAPSULE ORAL at 08:45

## 2021-04-08 RX ADMIN — ENOXAPARIN SODIUM 105 MG: 120 INJECTION SUBCUTANEOUS at 21:19

## 2021-04-08 RX ADMIN — DILTIAZEM HYDROCHLORIDE 120 MG: 60 TABLET, FILM COATED ORAL at 08:46

## 2021-04-08 RX ADMIN — LISINOPRIL 40 MG: 20 TABLET ORAL at 08:45

## 2021-04-08 RX ADMIN — DOXYCYCLINE 100 MG: 100 CAPSULE ORAL at 21:19

## 2021-04-08 RX ADMIN — GLIMEPIRIDE 4 MG: 2 TABLET ORAL at 21:19

## 2021-04-08 RX ADMIN — GLIMEPIRIDE 4 MG: 2 TABLET ORAL at 08:46

## 2021-04-08 RX ADMIN — DILTIAZEM HYDROCHLORIDE 120 MG: 60 TABLET, FILM COATED ORAL at 17:18

## 2021-04-08 RX ADMIN — OXYCODONE HYDROCHLORIDE 10 MG: 5 TABLET ORAL at 00:42

## 2021-04-08 RX ADMIN — SITAGLIPTIN 100 MG: 100 TABLET, FILM COATED ORAL at 08:45

## 2021-04-08 RX ADMIN — ENOXAPARIN SODIUM 105 MG: 120 INJECTION SUBCUTANEOUS at 08:46

## 2021-04-08 NOTE — PLAN OF CARE
Problem: Potential for Falls  Goal: Patient will remain free of falls  Description: INTERVENTIONS:  - Assess patient frequently for physical needs  -  Identify cognitive and physical deficits and behaviors that affect risk of falls    -  [place] fall precautions as indicated by assessment   - Educate patient/family on patient safety including physical limitations  - Instruct patient to call for assistance with activity based on assessment  - Modify environment to reduce risk of injury  - Consider OT/PT consult to assist with strengthening/mobility  Outcome: Progressing     Problem: PAIN - ADULT  Goal: Verbalizes/displays adequate comfort level or baseline comfort level  Description: Interventions:  - Encourage patient to monitor pain and request assistance  - Assess pain using appropriate pain scale  - Administer analgesics based on type and severity of pain and evaluate response  - Implement non-pharmacological measures as appropriate and evaluate response  - Consider cultural and social influences on pain and pain management  - Notify physician/advanced practitioner if interventions unsuccessful or patient reports new pain  Outcome: Progressing     Problem: INFECTION - ADULT  Goal: Absence or prevention of progression during hospitalization  Description: INTERVENTIONS:  - Assess and monitor for signs and symptoms of infection  - Monitor lab/diagnostic results  - Monitor all insertion sites, i e  indwelling lines, tubes, and drains  - Monitor endotracheal if appropriate and nasal secretions for changes in amount and color  - [place] appropriate cooling/warming therapies per order  - Administer medications as ordered  - Instruct and encourage patient and family to use good hand hygiene technique  - Identify and instruct in appropriate isolation precautions for identified infection/condition  Outcome: Progressing  Goal: Absence of fever/infection during neutropenic period  Description: INTERVENTIONS:  - Monitor WBC    Outcome: Progressing     Problem: SAFETY ADULT  Goal: Patient will remain free of falls  Description: INTERVENTIONS:  - Assess patient frequently for physical needs  -  Identify cognitive and physical deficits and behaviors that affect risk of falls    -  [place] fall precautions as indicated by assessment   - Educate patient/family on patient safety including physical limitations  - Instruct patient to call for assistance with activity based on assessment  - Modify environment to reduce risk of injury  - Consider OT/PT consult to assist with strengthening/mobility  Outcome: Progressing  Goal: Maintain or return to baseline ADL function  Description: INTERVENTIONS:  -  Assess patient's ability to carry out ADLs; assess patient's baseline for ADL function and identify physical deficits which impact ability to perform ADLs (bathing, care of mouth/teeth, toileting, grooming, dressing, etc )  - Assess/evaluate cause of self-care deficits   - Assess range of motion  - Assess patient's mobility; develop plan if impaired  - Assess patient's need for assistive devices and provide as appropriate  - Encourage maximum independence but intervene and supervise when necessary  - Involve family in performance of ADLs  - Assess for home care needs following discharge   - Consider OT consult to assist with ADL evaluation and planning for discharge  - Provide patient education as appropriate  Outcome: Progressing  Goal: Maintain or return mobility status to optimal level  Description: INTERVENTIONS:  - Assess patient's baseline mobility status (ambulation, transfers, stairs, etc )    - Identify cognitive and physical deficits and behaviors that affect mobility  - Identify mobility aids required to assist with transfers and/or ambulation (gait belt, sit-to-stand, lift, walker, cane, etc )  - [place] fall precautions as indicated by assessment  - Record patient progress and toleration of activity level on Mobility SBAR; progress patient to next Phase/Stage  - Instruct patient to call for assistance with activity based on assessment  - Consider rehabilitation consult to assist with strengthening/weightbearing, etc   Outcome: Progressing     Problem: DISCHARGE PLANNING  Goal: Discharge to home or other facility with appropriate resources  Description: INTERVENTIONS:  - Identify barriers to discharge w/patient and caregiver  - Arrange for needed discharge resources and transportation as appropriate  - Identify discharge learning needs (meds, wound care, etc )  - Arrange for interpretive services to assist at discharge as needed  - Refer to Case Management Department for coordinating discharge planning if the patient needs post-hospital services based on physician/advanced practitioner order or complex needs related to functional status, cognitive ability, or social support system  Outcome: Progressing     Problem: Knowledge Deficit  Goal: Patient/family/caregiver demonstrates understanding of disease process, treatment plan, medications, and discharge instructions  Description: Complete learning assessment and assess knowledge base  Interventions:  - Provide teaching at level of understanding  - Provide teaching via preferred learning methods  Outcome: Progressing     Problem: Nutrition/Hydration-ADULT  Goal: Nutrient/Hydration intake appropriate for improving, restoring or maintaining nutritional needs  Description: Monitor and assess patient's nutrition/hydration status for malnutrition  Collaborate with interdisciplinary team and initiate plan and interventions as ordered  Monitor patient's weight and dietary intake as ordered or per policy  Utilize nutrition screening tool and intervene as necessary  Determine patient's food preferences and provide high-protein, high-caloric foods as appropriate       INTERVENTIONS:  - Monitor oral intake, urinary output, labs, and treatment plans  - Assess nutrition and hydration status and recommend course of action  - Evaluate amount of meals eaten  - Assist patient with eating if necessary   - Allow adequate time for meals  - Recommend/ encourage appropriate diets, oral nutritional supplements, and vitamin/mineral supplements  - Order, calculate, and assess calorie counts as needed  - Recommend, monitor, and adjust tube feedings and TPN/PPN based on assessed needs  - Assess need for intravenous fluids  - Provide specific nutrition/hydration education as appropriate  - Include patient/family/caregiver in decisions related to nutrition  Outcome: Progressing

## 2021-04-08 NOTE — PROGRESS NOTES
Progress Note - Plastic Surgery   Milad Dial  73 y o  male MRN: [id number]  Unit/Bed#: [facility] 815-01 Encounter: [id number]    Assessment:  Principal Problem:    Atrial fibrillation with RVR ([place] [address] )  Active Problems:    Essential hypertension    Acquired absence of other right toe(s) ([place] [address] )    Deep disruption or dehiscence of operation wound    Ambulatory dysfunction    Diabetic ulcer of right 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, with fat layer exposed ([place] [address] )    S/P transmetatarsal amputation of foot, right ([place] [address] )      Plan:  - Appreciated Cardiology's assistance with management of patient  - F/u Internal Medicine recommendations  - Continue medical optimization in preparation for free flap to R foot  - OR hopefully next week    Subjective/Objective   Chief Complaint: R foot wound    Subjective: resting in bed  Pain controlled  TATIANA overnight  Objective:     Blood pressure 127/80, pulse 62, temperature 97 5 °F (36 4 °C), resp  rate 16, height 6' 5" (1 956 m), weight 108 kg (237 lb 1 3 oz), SpO2 99 %  ,Body mass index is 28 11 kg/m²  Intake/Output Summary (Last 24 hours) at 4/8/2021 0736  Last data filed at 4/8/2021 0554  Gross per 24 hour   Intake 2230 ml   Output 260 ml   Net 1970 ml       Invasive Devices     Peripheral Intravenous Line            Peripheral IV 04/07/21 Left Wrist less than 1 day          Drain            Negative Pressure Wound Therapy (V A C ) Foot less than 1 day                Physical Exam:   Gen: NAD  Neuro: Alert, oriented  CV: regular rate  Pulm: no respiratory distress  Abd: nondistended  GI: No [place] catheter  Ext: RLE VAC to suction without leak     Lab, Imaging and other studies:I have personally reviewed pertinent lab results      VTE Pharmacologic Prophylaxis: Sequential compression device (Venodyne)  and Enoxaparin (Lovenox)  VTE Mechanical Prophylaxis: sequential compression device

## 2021-04-08 NOTE — UTILIZATION REVIEW
Initial Clinical Review    Elective inpatient surgical procedure [facility] [id number]    Age/Sex: 73 y o  male with diabetic ulcer of right midfoot with fat layer exposed  Had foot fusion and reconstruction with external fixator on 2-12-21  Complicated by post op infection  Requiring hematoma evacuation, fasciotomy for compartment syndrome and removal of the external fixator  Trans met amputation done 2-22-21  Now with large secondary defect with exposure of bone for which plastic surgery is planning procedure to salvage his trans met amp stump  Patient wants to attempt limb salvage  Plan for right foot excisional debridement , closure with free tissue transfer      Anesthesia Start Date/Time: 04/07/21 1236   Procedure: DEBRIDEMENT RIGHT FOOT (Right Foot) - 5 5 x 5 x 1 5   Anesthesia type: general   Diagnosis: Acquired absence of other right toe(s) ([facility] ) [Y34 337]   Pre-op diagnosis: Acquired absence of other right toe(s) ([facility] ) [Z89 421]     Procedure(s) (LRB):  DEBRIDEMENT RIGHT FOOT (Right)     Estimated Blood Loss:   Minimal       Findings:    Fibrinous debris in both medial and lateral aspects of foot  Free flap reconstruction aborted due to atrial fibrillation with RVR and hypotension     Complications:   None     POD#1 Progress Note:  Plastic surgery hopes to pursue free flap of right foot hopefully early next week  Cardiology consulted to medically optimize  Pain controlled  Vac dressing in place  Consult cardiology   4-7-21     Assessment and plan:   1  Persistent atrial fibrillation with rapid ventricular response due to discontinuation of calcium channel blockers  We will resume the calcium channel blockers  He will receive 180 mg long-acting Cardizem tonight    Depending on his blood pressure and heart rate tomorrow, we will either increase his Cardizem to 180 mg b i d  or keep him at his regular home dose of 120 mg b i d  furthermore I would recommend that the patient should take his morning dose of Cardizem prior to surgery      2  Revised cardiac risk index is 0  Further preoperative ischemic workup is not advised  There is no obvious absolute contraindication to the upcoming foot surgery  Eliquis typically should be discontinued 48 hours prior to  planned surgical procedure, and resumed postoperatively once bleeding risk is deemed to be low       Date: 04/12/21  Tentative    Procedures:        DEBRIDEMENT LOWER EXTREMITY ([address] OUT) (Right Foot)       RECONSTRUCTION MICROSURGICAL W/ FREE FLAP (Right Foot)   Anesthesia type: General   Diagnosis:        Type 2 diabetes mellitus with diabetic neuropathy, without long-term current use of insulin ([facility]) [E11 40]       S/P transmetatarsal amputation of foot, right [facility]) [Z89 431]         Admission Orders: Date/Time/Statement:   Admission Orders (From admission, onward)     Ordered        04/07/21 1505  Inpatient Admission  Once                   Orders Placed This Encounter   Procedures    Inpatient Admission     Standing Status:   Standing     Number of Occurrences:   1     Order Specific Question:   Level of Care     Answer:   Med Surg [16]     Order Specific Question:   Estimated length of stay     Answer:   More than 2 Midnights     Order Specific Question:   Certification     Answer:   I certify that inpatient services are medically necessary for this patient for a duration of greater than two midnights  See H&P and MD Progress Notes for additional information about the patient's course of treatment       Vital Signs: /80   Pulse 62   Temp 97 5 °F (36 4 °C)   Resp 16   Ht 6' 5" (1 956 m)   Wt 108 kg (237 lb 1 3 oz)   SpO2 99%   BMI 28 11 kg/m²        Diet: regular   Mobility:  Up in chair    DVT Prophylaxis:  sequential compression device and lovenox  Medications/Pain Control: po oxycodone prn           Scheduled Medications:  atorvastatin, 40 mg, Oral, Daily With Dinner  diltiazem, 120 mg, Oral, BID  doxycycline hyclate, 100 mg, Oral, Q12H [address]  enoxaparin, 1 mg/kg, Subcutaneous, Q12H SUNG  glimepiride, 4 mg, Oral, BID  lisinopril, 40 mg, Oral, Daily  sitaGLIPtin, 100 mg, Oral, Daily      Continuous IV Infusions:     PRN Meds:  acetaminophen, 650 mg, Oral, Q4H PRN  oxyCODONE, 10 mg, Oral, Q4H PRN  oxyCODONE, 5 mg, Oral, Q4H PRN        Network Utilization Review Department  ATTENTION: Please call with any questions or concerns to [phone] and carefully listen to the prompts so that you are directed to the right person  All voicemails are confidential   Jeferson Mendoza all requests for admission clinical reviews, approved or denied determinations and any other requests to dedicated fax number below belonging to the campus where the patient is receiving treatment   List of dedicated fax numbers for the Facilities:  [address] DENIALS (Administrative/Medical Necessity) [phone]   [address] (Maternity/NICU/Pediatrics) [phone]   [address] [place] [address] Dr Rowdy Rene 6691 (  Koby Martin "Trinity" 103) [address] [place] [address] [place] [place]) [address] [place] [address] 951 [phone]

## 2021-04-08 NOTE — PROGRESS NOTES
Progress Note - Cardiology   Brianda Hinkle  73 y o  male MRN: [id number]  Unit/Bed#: [facility] 815-01 Encounter: [id number]    Assessment and Plan:  Atrial fibrillation with RVR  Pt was reported to be in Afib with RVR and hypotensive during the R foot debridement and skin flap reconstruction yesterday  Procedure was aborted  Home medications include cardizem 120mg BID and Eliquis 5mg BID, for which he is usually compliant; however, he missed 1-2 doses of his cardizem prior to the procedure  He stopped his Eliquis 5 days prior as instructed by his PCP  · Echo 2018 - EF 50%, no regional wall abnormalities, mildly dilated LV, RA  · Prior EKGs reviewed - chronic afib  · CHADS-VASc score 2  · Given one increased dose of cardizem 180mg last night and returned to home dose 120mg BID this morning  Tolerated very well, and denied having any chest pain, SOB, orthopnea, PND, palpitations and edema  On telemetry, the patient was rate-controlled at rates of 60-70s  · The patient may continue his home dose of cardizem 120mg BID and Eliquis 5mg BID  Recommend follow up with PCP and if patient demonstrates Afib with RVR again, can increase dose to 180mg BID then  · Pt is likely to get procedure in 1-2 weeks - recommend that the patient takes his morning dose of cardizem prior to surgery, and stop his Eliquis 2 days prior  · Other medications include Lipitor 40mg qd, lisinopril 40mg qd     Hyperlipidemia  · Currently on Lipitor 40mg qd  · Lipid profile 2018 - Total 140, , HDL 38, LDL 72      Subjective: Pt was seen and examined at bedside  The patient denies any anginal symptoms and does not have any current complaints  He tolerated the increased dose of Cardizem last night well           Vitals: /80   Pulse 62   Temp 97 5 °F (36 4 °C)   Resp 16   Ht 6' 5" (1 956 m)   Wt 108 kg (237 lb 1 3 oz)   SpO2 99%   BMI 28 11 kg/m²   Vitals:    04/07/21 1117 04/07/21 1714   Weight: 108 kg (238 lb) 108 kg (237 lb 1 3 oz) Orthostatic Blood Pressures      Most Recent Value   Blood Pressure  127/80 filed at 04/08/2021 0731            Intake/Output Summary (Last 24 hours) at 4/8/2021 0844  Last data filed at 4/8/2021 0554  Gross per 24 hour   Intake 2230 ml   Output 260 ml   Net 1970 ml       Invasive Devices     Peripheral Intravenous Line            Peripheral IV 04/07/21 Left Wrist less than 1 day          Drain            Negative Pressure Wound Therapy (V A C ) Foot less than 1 day              Review of Systems   Constitutional: Negative for chills, diaphoresis and fever  HENT:  Negative  Respiratory: Negative for chest tightness, cough and shortness of breath  Cardiovascular: Negative for chest pain, leg swelling and palpitations  Gastrointestinal: Negative for abdominal distention and abdominal pain  Genitourinary: Negative for difficulty urinating  Musculoskeletal: Negative  Neurological: Negative  Psychiatric/Behavioral: The patient is not nervous/anxious  Physical Exam  Vitals signs reviewed  Constitutional:       Appearance: Normal appearance  He is normal weight  He is not ill-appearing  HENT:      Head: Normocephalic and atraumatic  Mouth/Throat:      Mouth: Mucous membranes are dry  Pharynx: Oropharynx is clear  Eyes:      Extraocular Movements: Extraocular movements intact  Conjunctiva/sclera: Conjunctivae normal       Pupils: Pupils are equal, round, and reactive to light  Neck:      Musculoskeletal: Normal range of motion and neck supple  Cardiovascular:      Rate and Rhythm: Normal rate  Rhythm irregular  Pulses: Normal pulses  Heart sounds: No murmur  Pulmonary:      Effort: Pulmonary effort is normal  No respiratory distress  Breath sounds: Examination of the right-lower field reveals wheezing  Wheezing present  Comments: Throat sore from intubation yesterday  Minimal wheezing on RLL  Abdominal:      General: Abdomen is flat   Bowel sounds are normal       Palpations: Abdomen is soft  Musculoskeletal: Normal range of motion  Right lower leg: No edema  Left lower leg: No edema  Neurological:      General: No focal deficit present  Mental Status: He is alert and oriented to person, place, and time  Psychiatric:         Mood and Affect: Mood normal          Behavior: Behavior normal            Lab Results:   I have personally reviewed pertinent lab results  CBC with diff:   Results from last 7 days   Lab Units 04/08/21  0548   WBC Thousand/uL 7 55   RBC Million/uL 3 86*   HEMOGLOBIN g/dL 11 6*   HEMATOCRIT % 37 4   MCV fL 97   MCH pg 30 1   MCHC g/dL 31 0*   RDW % 15 9*   MPV fL 10 4   PLATELETS Thousands/uL 267     CMP:   Results from last 7 days   Lab Units 04/08/21  0548   SODIUM mmol/L 141   POTASSIUM mmol/L 3 8   CHLORIDE mmol/L 109*   CO2 mmol/L 28   BUN mg/dL 13   CREATININE mg/dL 0 90   CALCIUM mg/dL 9 3   EGFR ml/min/1 73sq m 87     Troponin:   0   Lab Value Date/Time    TROPONINI <0 02 02/16/2021 1648    TROPONINI <0 02 11/06/2018 1423     Imaging: I have personally reviewed pertinent reports  EKG: I have personally reviewed pertinent reports      VTE Pharmacologic Prophylaxis: Enoxaparin (Lovenox)  VTE Mechanical Prophylaxis: sequential compression device    Britni Cooper   MS4

## 2021-04-08 NOTE — CASE MANAGEMENT
CM met with the Pt, explained CM program/CM role  LOS- 22 hours  Bundle- No   Unplanned readmission color- (13, green low)  30 Day readmit- Yes, last admission was 1 month ago  Pt lives in a 1 story modular home  Pt lives with his wife Rhonda  Pt has 0 JAMEY- and 1 handicapped ramp to get into the home  Pt's bedroom is located in the front, as well as a accessible bathroom close by  Pt's primary caregiver agency is [employer] who would come M,W, and F and provide a visiting RN and PT/OT Pt was independent prior to admission/surgery  Pt is unable to drive at this time  Pt's PCP provider is listed as Sandra Bains MD Phone: [phone]  Pt's pharmacy provider is [facility] Phone: [phone] Fax: [phone]  Pt is able to afford all medications  Pt has hx of [facility] with [address]  Pt uses a W/C, walker, commode, and L leg brace  Pt has had a past stay in SNF at [facility]  Pt is familiar with SNF  Pt has no MH issues or suffers from D&A issues  Pt is retired, and now works only part-time on occasion  Pt's primary EC is Rhonda, wife [phone] (M) [phone] (M)  CM was granted permission to speak with wife to discuss DC planning  Pt has no Medical POA or LW completed on file  Pt is requesting copies- CM will provide for the Pt  CM stated LW will need to be notarized  DC plan is TBD- rehab vs  [facility]  Pt's transport at DC will be his wife via car pickup  CM will continue to follow and support the Pt as needed  CM is following closely until DC

## 2021-04-08 NOTE — CONSULTS
Podiatry - Consultation    Patient Information:   Tracey Tejeda  73 y o  male MRN: [id number]  Unit/Bed#: [facility] 815-01 Encounter: [id number]  PCP: Manju Garcia MD  Date of Admission:  4/7/2021  Date of Consultation: 04/08/21  Requesting Physician: Chloé Andrews,*      ASSESSMENT:    Tracey Tejeda  is a 73 y o  male with:    1  Left 2nd digit wound - [place] 1  2  T2DM  3  Afib with RVR  4  Hypotension  5  Right foot wounds - managed by Plastic Surgery - POA  - S/p exfix removal, I&D, fasciotomy (DOS 2/17/21)  - s/p TMA of left foot 2/22  - s/p debridement and vac application per ALEX (7/5/33)  - s/p Percutaneous screw fixation right medial column (3/4/21)  - s/p debridement (4/7/21)    PLAN:    · Local wound care consisting of neosporin, dry sterile dressing to 2nd digit  Wound care instructions placed  · Right foot wounds being managed by Plastic Surgery  Patient was to undergo a free flap reconstruction 4/7/21 however it was aborted post-debridement 2/2 Afib with RVR and hypotension  · Elevation on green foam wedges or pillows when non-ambulatory  · Rest of care per primary team   · Will discuss this plan with my attending and update as needed  Weightbearing status: DEEPIKA GUTIERREZ    SUBJECTIVE:    History of Present Illness:    Tracey Tejeda  is a 73 y o  male who is originally admitted 4/7/2021 due to Afib with RVR and hypotension, patient was in the OR for a free flap recon procedure on the right foot with Plastic Surgery  Patient has a past medical history of T2DM, right congenital clubfoot, Afib on Eliquis  We are consulted for a left 2nd digit wound 2/2 patient stepping on his right foot wound VAC tubing clamp yesterday while transferring  The toe was dressed with neosporin and a bandaid  He is well known to Dr Kendall Armendariz   He underwent reconstruction of his right congential clubfoot 2/12/21 and subsequently developing a hematoma after attempting to bear weight on his external fixator which required multiple surgeries and eventual TMA  He is here for free flap reconstruction of the right foot TMA  Patient denies nausea, vomiting, chest pain, shortness of breath, chills, fever  Review of Systems:    Constitutional: Negative  HENT: Negative  Eyes: Negative  Respiratory: Negative  Cardiovascular: Negative  Gastrointestinal: Negative  Musculoskeletal: Right foot TMA   Skin: Right foot wounds, left toe wound   Neurological: Neuropathy   Psych: Negative       Past Medical and Surgical History:     Past Medical History:   Diagnosis Date    Arthritis     Atrial fibrillation ([facility] [address] )     Diagnosed 11/2018    Benign prostate hyperplasia 04/2002    Cellulitis     right lower leg     Colon polyp 2006    Diabetes mellitus ([facility] [address] )     Hearing aid worn     bilat    Hearing loss     90% loss left ear and 40% right ear    History of clubfoot     "since birth"    History of pneumonia     History of TIA (transient ischemic attack) 04/25/2017 11/30/18 pt denies    Hyperlipidemia 5/15/2014    Hypertension     Infectious viral hepatitis     "cant remember type"    Irregular heart beat     Neuropathy     both feet    Osteomyelitis ([facility] [address] )     right great toe    Right middle lobe pulmonary nodule 11/6/2018    Seasickness     Teeth missing     Type 2 diabetes mellitus with diabetic neuropathy ([facility] [address] ) 11/6/2018    Wears glasses     reading    Wound, open     bottom of right foot       Past Surgical History:   Procedure Laterality Date    BUNIONECTOMY Right 12/4/2018    Procedure: 5TH METATARSAL BONE PARTIAL RESECTION, FULL THICKNESS DEBRIDEMENT OF DIABETIC ULCER;  Surgeon: Nahomy Ontiveros DPM;  [facility];  Service: Podiatry    CLUB FOOT RELEASE Bilateral     COLONOSCOPY      EXTERNAL FIXATOR APPLICATION Right 0/59/9742    Procedure: Application multiplane external fixation;  Surgeon: Musa Hernandez DPM;  [facility];  Service: Podiatry    FOOT HARDWARE REMOVAL Right 2/17/2021    Procedure: REMOVAL EXTERNAL FIXATOR;  Surgeon: Ernie Lopez DPM;  [facility];  Service: Podiatry    INCISION AND DRAINAGE OF WOUND Right 2/17/2021    Procedure: INCISION AND DRAINAGE (I&D) EXTREMITY;  Surgeon: Ernie Lopez DPM;  [facility];  Service: Podiatry    ORIF FOOT FRACTURE Right 3/4/2021    Procedure: VAC PLACEMENT; SCREW FIXATION RIGHT FOOT FUSION;  Surgeon: Ernie Lopez DPM;  [facility];  Service: Podiatry    [address] [facility] Right 2/22/2021    Procedure: AMPUTATION TRANSMETATARSAL (TMA), REMOVAL NAIL IM T2 ICF HARDWARE;  Surgeon: Ernie Lopez DPM;  [facility];  Service: Podiatry    NH FUSION FOOT BONES,MIDTARSAL,OSTEOTMY Right 2/12/2021    Procedure: foot ARTHRODESIS/FUSION;  Surgeon: Ernie Lopez DPM;  [facility];  Service: Podiatry    NH LENGTH/SHORT LEG/ANKL TENDON,SINGLE Right 2/12/2021    Procedure: LENGTHEN TIBIAL TENDON, TRANS HEEL CORD;  Surgeon: Ernie Lopez DPM;  [facility];  Service: Podiatry    NH PART [address] [address] BONE METATARSAL HEAD,EA Right 12/29/2020    Procedure: EXCISION EXOSTOSIS;  Surgeon: Ernie Lopez DPM;  [facility];  Service: Linda Noonan WND EXTEN/COMPLIC Right 95/46/1518    Procedure: PRIMARY DELAYED CLOSURE;  Surgeon: Ernie Lopez DPM;  [facility];  Service: Podiatry    TOE AMPUTATION Right     partial great toe    TONSILLECTOMY      VAC DRESSING APPLICATION Right 3/1/5881    Procedure: APPLICATION VAC DRESSING EXTREMITY;  Surgeon: Ava Wells MD;  [facility];  Service: Plastics    [address] [facility] Right 3/1/2021    Procedure: DEBRIDEMENT LOWER EXTREMITY ([address] OUT);   Surgeon: Ava Wells MD;  [facility];  Service: Plastics       Meds/Allergies:    Medications Prior to Admission   Medication    Acetaminophen (TYLENOL ARTHRITIS PAIN PO)    atorvastatin (LIPITOR) 40 mg tablet    diltiazem (CARDIZEM) 120 MG tablet    doxycycline hyclate (VIBRAMYCIN) 100 mg capsule    Eliquis 5 MG    glimepiride (AMARYL) 4 mg tablet    Januvia 100 MG tablet    lisinopril (ZESTRIL) 40 mg tablet    metFORMIN (GLUCOPHAGE) 500 mg tablet    Blood Glucose Monitoring Suppl (ACCU-CHEK JYOTI SMARTVIEW) w/Device KIT    glucose blood (Accu-Chek SmartView) test strip       Allergies   Allergen Reactions    Simvastatin Myalgia    Itraconazole Other (See Comments)     Pt denies knowledge of allergy           Social History:     Marital Status: /Civil Union    Substance Use History:   Social History     Substance and Sexual Activity   Alcohol Use Yes    Frequency: 2-4 times a month    Drinks per session: 1 or 2    Comment: socially     Social History     Tobacco Use   Smoking Status Former Smoker    Types: Cigarettes    Quit date: 1988    Years since quittin 4   Smokeless Tobacco Former User   Tobacco Comment    exposure to passive smoke     Social History     Substance and Sexual Activity   Drug Use Not Currently       Family History:    Family History   Problem Relation Age of Onset    Diabetes Father         mellitus         OBJECTIVE:    Vitals:   Blood Pressure: 127/80 (21)  Pulse: 62 (21)  Temperature: 97 5 °F (36 4 °C) (21)  Temp Source: Tympanic (21 1117)  Respirations: 16 (21)  Height: 6' 5" (195 6 cm) (21)  Weight - Scale: 108 kg (237 lb 1 3 oz) (21)  SpO2: 99 % (21)    Physical Exam:    General Appearance: Alert, cooperative, no distress  HEENT: Head normocephalic, atraumatic, without obvious abnormality  Heart: Normal rate and rhythm  Lungs: Non-labored breathing  No respiratory distress  Abdomen: Without distension  Psychiatric: AAOx3  Lower Extremity:  Vascular:   Right DP and PT pulses are biphasic by Doppler  Left DP and PT pulses are biphasic by Doppler   CRT < 3 seconds at the digits  +0/4 edema noted at bilateral lower extremities  Pedal hair is absent  Skin temperature is WNL bilaterally  Musculoskeletal:  MMT is 5/5 in all muscle compartments bilaterally  No Pain on palpation of global bilateral foot and ankle  Right foot TMA    Dermatological:    Right foot dressing c/d/I, wound vac running without leaks or blockage  Lower extremity wound(s) as noted below:    Wound #: 1  Location: left 2nd toe  Length 0 3cm: Width 0 3cm: Depth 0cm:   Deepest Tissue Noted in Base: subq  Probe to Bone: No  Peripheral Skin Description: Attached  Granulation: 100% Fibrotic Tissue: 0% Necrotic Tissue: 0%   Drainage Amount: none  Signs of Infection: No      Neurological:  Gross sensation is diminished  Protective sensation is absent  Patient Reports numbness and/or paresthesias  Clinical Images 04/08/21:          Additional data:     Lab Results: I have personally reviewed pertinent labs including:    Results from last 7 days   Lab Units 04/08/21  0548   WBC Thousand/uL 7 55   HEMOGLOBIN g/dL 11 6*   HEMATOCRIT % 37 4   PLATELETS Thousands/uL 267     Results from last 7 days   Lab Units 04/08/21  0548   POTASSIUM mmol/L 3 8   CHLORIDE mmol/L 109*   CO2 mmol/L 28   BUN mg/dL 13   CREATININE mg/dL 0 90   CALCIUM mg/dL 9 3           Cultures: I have personally reviewed pertinent cultures including:              Imaging: I have personally reviewed pertinent reports in PACS  EKG, Pathology, and Other Studies: I have personally reviewed pertinent reports  ** Please Note: Portions of the record may have been created with voice recognition software  Occasional wrong word or "sound a like" substitutions may have occurred due to the inherent limitations of voice recognition software  Read the chart carefully and recognize, using context, where substitutions have occurred   **

## 2021-04-08 NOTE — PLAN OF CARE
Problem: Potential for Falls  Goal: Patient will remain free of falls  Description: INTERVENTIONS:  - Assess patient frequently for physical needs  -  Identify cognitive and physical deficits and behaviors that affect risk of falls    -  [place] fall precautions as indicated by assessment   - Educate patient/family on patient safety including physical limitations  - Instruct patient to call for assistance with activity based on assessment  - Modify environment to reduce risk of injury  - Consider OT/PT consult to assist with strengthening/mobility  Outcome: Progressing     Problem: PAIN - ADULT  Goal: Verbalizes/displays adequate comfort level or baseline comfort level  Description: Interventions:  - Encourage patient to monitor pain and request assistance  - Assess pain using appropriate pain scale  - Administer analgesics based on type and severity of pain and evaluate response  - Implement non-pharmacological measures as appropriate and evaluate response  - Consider cultural and social influences on pain and pain management  - Notify physician/advanced practitioner if interventions unsuccessful or patient reports new pain  Outcome: Progressing     Problem: INFECTION - ADULT  Goal: Absence or prevention of progression during hospitalization  Description: INTERVENTIONS:  - Assess and monitor for signs and symptoms of infection  - Monitor lab/diagnostic results  - Monitor all insertion sites, i e  indwelling lines, tubes, and drains  - Monitor endotracheal if appropriate and nasal secretions for changes in amount and color  - [place] appropriate cooling/warming therapies per order  - Administer medications as ordered  - Instruct and encourage patient and family to use good hand hygiene technique  - Identify and instruct in appropriate isolation precautions for identified infection/condition  Outcome: Progressing  Goal: Absence of fever/infection during neutropenic period  Description: INTERVENTIONS:  - Monitor WBC    Outcome: Progressing     Problem: SAFETY ADULT  Goal: Patient will remain free of falls  Description: INTERVENTIONS:  - Assess patient frequently for physical needs  -  Identify cognitive and physical deficits and behaviors that affect risk of falls    -  [place] fall precautions as indicated by assessment   - Educate patient/family on patient safety including physical limitations  - Instruct patient to call for assistance with activity based on assessment  - Modify environment to reduce risk of injury  - Consider OT/PT consult to assist with strengthening/mobility  Outcome: Progressing  Goal: Maintain or return to baseline ADL function  Description: INTERVENTIONS:  -  Assess patient's ability to carry out ADLs; assess patient's baseline for ADL function and identify physical deficits which impact ability to perform ADLs (bathing, care of mouth/teeth, toileting, grooming, dressing, etc )  - Assess/evaluate cause of self-care deficits   - Assess range of motion  - Assess patient's mobility; develop plan if impaired  - Assess patient's need for assistive devices and provide as appropriate  - Encourage maximum independence but intervene and supervise when necessary  - Involve family in performance of ADLs  - Assess for home care needs following discharge   - Consider OT consult to assist with ADL evaluation and planning for discharge  - Provide patient education as appropriate  Outcome: Progressing  Goal: Maintain or return mobility status to optimal level  Description: INTERVENTIONS:  - Assess patient's baseline mobility status (ambulation, transfers, stairs, etc )    - Identify cognitive and physical deficits and behaviors that affect mobility  - Identify mobility aids required to assist with transfers and/or ambulation (gait belt, sit-to-stand, lift, walker, cane, etc )  - [place] fall precautions as indicated by assessment  - Record patient progress and toleration of activity level on Mobility SBAR; progress patient to next Phase/Stage  - Instruct patient to call for assistance with activity based on assessment  - Consider rehabilitation consult to assist with strengthening/weightbearing, etc   Outcome: Progressing     Problem: DISCHARGE PLANNING  Goal: Discharge to home or other facility with appropriate resources  Description: INTERVENTIONS:  - Identify barriers to discharge w/patient and caregiver  - Arrange for needed discharge resources and transportation as appropriate  - Identify discharge learning needs (meds, wound care, etc )  - Arrange for interpretive services to assist at discharge as needed  - Refer to Case Management Department for coordinating discharge planning if the patient needs post-hospital services based on physician/advanced practitioner order or complex needs related to functional status, cognitive ability, or social support system  Outcome: Progressing     Problem: Knowledge Deficit  Goal: Patient/family/caregiver demonstrates understanding of disease process, treatment plan, medications, and discharge instructions  Description: Complete learning assessment and assess knowledge base    Interventions:  - Provide teaching at level of understanding  - Provide teaching via preferred learning methods  Outcome: Progressing

## 2021-04-09 LAB
GLUCOSE SERPL-MCNC: 141 MG/DL (ref 65–140)
GLUCOSE SERPL-MCNC: 156 MG/DL (ref 65–140)
GLUCOSE SERPL-MCNC: 225 MG/DL (ref 65–140)
GLUCOSE SERPL-MCNC: 228 MG/DL (ref 65–140)

## 2021-04-09 PROCEDURE — NC001 PR NO CHARGE: Performed by: PLASTIC SURGERY

## 2021-04-09 PROCEDURE — 99233 SBSQ HOSP IP/OBS HIGH 50: CPT | Performed by: INTERNAL MEDICINE

## 2021-04-09 PROCEDURE — 82948 REAGENT STRIP/BLOOD GLUCOSE: CPT

## 2021-04-09 RX ORDER — DILTIAZEM HYDROCHLORIDE 60 MG/1
180 TABLET, FILM COATED ORAL 2 TIMES DAILY
Status: DISCONTINUED | OUTPATIENT
Start: 2021-04-09 | End: 2021-04-13

## 2021-04-09 RX ORDER — EPHEDRINE SULFATE 50 MG/ML
INJECTION INTRAVENOUS AS NEEDED
Status: DISCONTINUED | OUTPATIENT
Start: 2021-04-07 | End: 2021-04-09

## 2021-04-09 RX ORDER — METOPROLOL TARTRATE 50 MG/1
50 TABLET, FILM COATED ORAL ONCE
Status: COMPLETED | OUTPATIENT
Start: 2021-04-09 | End: 2021-04-09

## 2021-04-09 RX ORDER — INSULIN GLARGINE 100 [IU]/ML
10 INJECTION, SOLUTION SUBCUTANEOUS
Status: DISCONTINUED | OUTPATIENT
Start: 2021-04-09 | End: 2021-04-13

## 2021-04-09 RX ADMIN — DOXYCYCLINE 100 MG: 100 CAPSULE ORAL at 08:15

## 2021-04-09 RX ADMIN — INSULIN LISPRO 3 UNITS: 100 INJECTION, SOLUTION INTRAVENOUS; SUBCUTANEOUS at 17:21

## 2021-04-09 RX ADMIN — LISINOPRIL 40 MG: 20 TABLET ORAL at 08:15

## 2021-04-09 RX ADMIN — ATORVASTATIN CALCIUM 40 MG: 40 TABLET, FILM COATED ORAL at 17:21

## 2021-04-09 RX ADMIN — ENOXAPARIN SODIUM 105 MG: 120 INJECTION SUBCUTANEOUS at 22:50

## 2021-04-09 RX ADMIN — METOPROLOL TARTRATE 50 MG: 50 TABLET, FILM COATED ORAL at 08:52

## 2021-04-09 RX ADMIN — DILTIAZEM HYDROCHLORIDE 180 MG: 60 TABLET, FILM COATED ORAL at 17:21

## 2021-04-09 RX ADMIN — DILTIAZEM HYDROCHLORIDE 180 MG: 60 TABLET, FILM COATED ORAL at 09:15

## 2021-04-09 RX ADMIN — GLIMEPIRIDE 4 MG: 2 TABLET ORAL at 08:15

## 2021-04-09 RX ADMIN — INSULIN LISPRO 2 UNITS: 100 INJECTION, SOLUTION INTRAVENOUS; SUBCUTANEOUS at 11:42

## 2021-04-09 RX ADMIN — INSULIN GLARGINE 10 UNITS: 100 INJECTION, SOLUTION SUBCUTANEOUS at 22:50

## 2021-04-09 RX ADMIN — INSULIN LISPRO 3 UNITS: 100 INJECTION, SOLUTION INTRAVENOUS; SUBCUTANEOUS at 11:42

## 2021-04-09 RX ADMIN — DOXYCYCLINE 100 MG: 100 CAPSULE ORAL at 22:49

## 2021-04-09 RX ADMIN — ENOXAPARIN SODIUM 105 MG: 120 INJECTION SUBCUTANEOUS at 08:15

## 2021-04-09 RX ADMIN — SITAGLIPTIN 100 MG: 100 TABLET, FILM COATED ORAL at 08:15

## 2021-04-09 NOTE — NURSING NOTE
Patient's heart rate this am in the 150-160s on monitor  Other vital signs stable at this time  Patient stable and comfortable at this time  Cardiology made aware and will be making changes to medication regimen  Will continue to monitor at this time

## 2021-04-09 NOTE — ASSESSMENT & PLAN NOTE
patient was scheduled to undergo elective right foot debridement and free flap reconstruction however this procedure was aborted due to AFib with RVR and hypertension during induction  Plan  Stable, rates controlled  -cardiology is following  -Pt on diltiazem 180 mg b i d , was not able to tolerate PO meds and was on Afib w RVR 4/12/21  Started on cardizem gtt by cards   -metoprolo 50 mg q12  -home Eliquis 5 mg b i d   Currently on hold  -continue telemetry monitoring

## 2021-04-09 NOTE — ASSESSMENT & PLAN NOTE
Lab Results   Component Value Date    HGBA1C 7 9 (H) 03/17/2021       Recent Labs     04/13/21  0211 04/13/21  0251 04/13/21  0538 04/13/21  0701   POCGLU 232* 207* 150* 109       Blood Sugar Average: Last 72 hrs:  (P) 276 [id number]     Plan    Transition patient from drip to Lantus 15 U QD and Lispro 5 TID with sliding scale  Continue to monitor

## 2021-04-09 NOTE — ASSESSMENT & PLAN NOTE
Lab Results   Component Value Date    HGBA1C 7 9 (H) 03/17/2021       Recent Labs     04/13/21  0211 04/13/21  0251 04/13/21  0538 04/13/21  0701   POCGLU 232* 207* 150* 109       Blood Sugar Average: Last 72 hrs:  (P) 262 [id number]     Plan  -Transition patient to Lantus 12 U QD, Lispro 5 U TID, ISS  -continue to monitor blood glucose

## 2021-04-09 NOTE — ADDENDUM NOTE
Addendum  created 04/09/21 1536 by Henrietta Ramirez MD    Clinical Note Signed, Intraprocedure Event edited, Intraprocedure Meds edited

## 2021-04-09 NOTE — PLAN OF CARE
Problem: Potential for Falls  Goal: Patient will remain free of falls  Description: INTERVENTIONS:  - Assess patient frequently for physical needs  -  Identify cognitive and physical deficits and behaviors that affect risk of falls    -  [place] fall precautions as indicated by assessment   - Educate patient/family on patient safety including physical limitations  - Instruct patient to call for assistance with activity based on assessment  - Modify environment to reduce risk of injury  - Consider OT/PT consult to assist with strengthening/mobility  Outcome: Progressing     Problem: PAIN - ADULT  Goal: Verbalizes/displays adequate comfort level or baseline comfort level  Description: Interventions:  - Encourage patient to monitor pain and request assistance  - Assess pain using appropriate pain scale  - Administer analgesics based on type and severity of pain and evaluate response  - Implement non-pharmacological measures as appropriate and evaluate response  - Consider cultural and social influences on pain and pain management  - Notify physician/advanced practitioner if interventions unsuccessful or patient reports new pain  Outcome: Progressing     Problem: INFECTION - ADULT  Goal: Absence or prevention of progression during hospitalization  Description: INTERVENTIONS:  - Assess and monitor for signs and symptoms of infection  - Monitor lab/diagnostic results  - Monitor all insertion sites, i e  indwelling lines, tubes, and drains  - Monitor endotracheal if appropriate and nasal secretions for changes in amount and color  - [place] appropriate cooling/warming therapies per order  - Administer medications as ordered  - Instruct and encourage patient and family to use good hand hygiene technique  - Identify and instruct in appropriate isolation precautions for identified infection/condition  Outcome: Progressing  Goal: Absence of fever/infection during neutropenic period  Description: INTERVENTIONS:  - Monitor WBC    Outcome: Progressing     Problem: SAFETY ADULT  Goal: Patient will remain free of falls  Description: INTERVENTIONS:  - Assess patient frequently for physical needs  -  Identify cognitive and physical deficits and behaviors that affect risk of falls    -  [place] fall precautions as indicated by assessment   - Educate patient/family on patient safety including physical limitations  - Instruct patient to call for assistance with activity based on assessment  - Modify environment to reduce risk of injury  - Consider OT/PT consult to assist with strengthening/mobility  Outcome: Progressing  Goal: Maintain or return to baseline ADL function  Description: INTERVENTIONS:  -  Assess patient's ability to carry out ADLs; assess patient's baseline for ADL function and identify physical deficits which impact ability to perform ADLs (bathing, care of mouth/teeth, toileting, grooming, dressing, etc )  - Assess/evaluate cause of self-care deficits   - Assess range of motion  - Assess patient's mobility; develop plan if impaired  - Assess patient's need for assistive devices and provide as appropriate  - Encourage maximum independence but intervene and supervise when necessary  - Involve family in performance of ADLs  - Assess for home care needs following discharge   - Consider OT consult to assist with ADL evaluation and planning for discharge  - Provide patient education as appropriate  Outcome: Progressing  Goal: Maintain or return mobility status to optimal level  Description: INTERVENTIONS:  - Assess patient's baseline mobility status (ambulation, transfers, stairs, etc )    - Identify cognitive and physical deficits and behaviors that affect mobility  - Identify mobility aids required to assist with transfers and/or ambulation (gait belt, sit-to-stand, lift, walker, cane, etc )  - [place] fall precautions as indicated by assessment  - Record patient progress and toleration of activity level on Mobility SBAR; progress patient to next Phase/Stage  - Instruct patient to call for assistance with activity based on assessment  - Consider rehabilitation consult to assist with strengthening/weightbearing, etc   Outcome: Progressing     Problem: DISCHARGE PLANNING  Goal: Discharge to home or other facility with appropriate resources  Description: INTERVENTIONS:  - Identify barriers to discharge w/patient and caregiver  - Arrange for needed discharge resources and transportation as appropriate  - Identify discharge learning needs (meds, wound care, etc )  - Arrange for interpretive services to assist at discharge as needed  - Refer to Case Management Department for coordinating discharge planning if the patient needs post-hospital services based on physician/advanced practitioner order or complex needs related to functional status, cognitive ability, or social support system  Outcome: Progressing     Problem: Knowledge Deficit  Goal: Patient/family/caregiver demonstrates understanding of disease process, treatment plan, medications, and discharge instructions  Description: Complete learning assessment and assess knowledge base  Interventions:  - Provide teaching at level of understanding  - Provide teaching via preferred learning methods  Outcome: Progressing     Problem: Nutrition/Hydration-ADULT  Goal: Nutrient/Hydration intake appropriate for improving, restoring or maintaining nutritional needs  Description: Monitor and assess patient's nutrition/hydration status for malnutrition  Collaborate with interdisciplinary team and initiate plan and interventions as ordered  Monitor patient's weight and dietary intake as ordered or per policy  Utilize nutrition screening tool and intervene as necessary  Determine patient's food preferences and provide high-protein, high-caloric foods as appropriate       INTERVENTIONS:  - Monitor oral intake, urinary output, labs, and treatment plans  - Assess nutrition and hydration status and recommend course of action  - Evaluate amount of meals eaten  - Assist patient with eating if necessary   - Allow adequate time for meals  - Recommend/ encourage appropriate diets, oral nutritional supplements, and vitamin/mineral supplements  - Order, calculate, and assess calorie counts as needed  - Recommend, monitor, and adjust tube feedings and TPN/PPN based on assessed needs  - Assess need for intravenous fluids  - Provide specific nutrition/hydration education as appropriate  - Include patient/family/caregiver in decisions related to nutrition  Outcome: Progressing

## 2021-04-09 NOTE — CONSULTS
INTERNAL MEDICINE RESIDENCY CONSULT NOTE     Name: Franchesca Moreno  Age & Sex: 73 y o  male   MRN: [id number]  Unit/Bed#: [facility] 815-01   Encounter: [id number]  Primary Care Provider: Altagracia Haywood MD    Code Status: Prior  Admission Status: INPATIENT   Disposition: Patient requires Med/Surg with Telemetry    ASSESSMENT/PLAN     Principal Problem:    Type 2 diabetes mellitus with diabetic neuropathy ([place] [address] )  Active Problems:    Hyperlipidemia    Atrial fibrillation with RVR ([place] [address] )    Essential hypertension    Acquired absence of other right toe(s) ([place] [address] )    Deep disruption or dehiscence of operation wound    Ambulatory dysfunction    Diabetic ulcer of right 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, with fat layer exposed ([place] [address] )    S/P transmetatarsal amputation of foot, right ([place] [address] )    Diabetic ulcer of right 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, with fat layer exposed [facility])  Assessment & Plan  Lab Results   Component Value Date    HGBA1C 7 9 (H) 03/17/2021       Recent Labs     04/07/21  1121 04/07/21  1437 04/09/21  1018 04/09/21  1117   POCGLU 215* 152* 225* 228*       Blood Sugar Average: Last 72 hrs:  (P) 205     Plan  -patient was scheduled to undergo elective right foot debridement and free flap reconstruction however this procedure was aborted due to AFib with RVR and hypertension during induction  -cardiology is following  -continue plan for AFib as above  -plan for OR next week if patient remains medically stable  -continue to monitor hemodynamic status    Essential hypertension  Assessment & Plan  Plan  -continue home lisinopril 40 mg daily  -continue diltiazem 180 mg b i d  Atrial fibrillation with RVR ([facility] )  Assessment & Plan  Plan  -cardiology is following  -continue diltiazem 180 mg b i d  -> should continue upon discharge  -can give metoprolol as needed (received 50 mg this morning)  -home Eliquis 5 mg b i d   Currently on hold  -continue therapeutic Lovenox for anticoagulation  -continue telemetry monitoring    Hyperlipidemia  Assessment & Plan  Plan   -continue home Lipitor 40 mg daily    * Type 2 diabetes mellitus with diabetic neuropathy [facility])  Assessment & Plan  Lab Results   Component Value Date    HGBA1C 7 9 (H) 03/17/2021       Recent Labs     04/07/21  1121 04/07/21  1437 04/09/21  1018 04/09/21  1117   POCGLU 215* 152* 225* 228*       Blood Sugar Average: Last 72 hrs:  (P) 205     Plan  -patient was previously on home glimepiride 4 mg b i d  and home sitagliptin 100 mg daily -> stopped today  -home metformin on hold  -patient does not take insulin at home   -will start insulin glargine 10 units HS, lispro 3 units t i d  With meals, and sliding scale coverage  -continue to monitor blood glucose and adjust insulin regimen as needed      VTE Pharmacologic Prophylaxis: Enoxaparin (Lovenox)  VTE Mechanical Prophylaxis: sequential compression device    CHIEF COMPLAINT   No chief complaint on file  HISTORY OF PRESENT ILLNESS     Patient is a 68-year-old male with a past medical history of type 2 diabetes, diabetic neuropathy, recurrent cellulitis of lower extremity, diabetic foot ulcer of the right midfoot with bone exposed status post TMA, AFib, hypertension, hyperlipidemia and BPH who was brought in for an elective right foot debridement and free flap reconstruction  However, upon induction for this surgery the patient was found to have AFib with RVR and hypotension and so the procedure was aborted  Cardiology was consulted and has been assisting in trying to control the patient's AFib with RVR  Podiatry was consulted as well to monitor the patient's new left lower extremity wound as well  General Medicine was consulted to assist in controlling the patient's blood sugars    His home diabetes control includes Januvia 100 mg daily, metformin 500 mg b i d , and glimepiride 4 mg b i d   Since admission the patient had been kept on his Januvia 100 mg daily and glimepiride 4 mg b i d  His sugars have remained consistently around the 200s  Today the patient was seen and examined at bedside  He had no acute events overnight  He states that overall he is feeling well and has no acute complaints  Both of his feet are currently wrapped  He denies any recent fevers, chills, chest pain, shortness of breath or abdominal pain  He states that he has been eating fine  He has not experienced any episodes of dizziness or lightheadedness  His only question was when he will be able to go home  Current plan is to hopefully undergo right foot debridement in free flap reconstruction next week  Will discontinue patient's home Januvia and glimepiride and start patient on insulin while inpatient  REVIEW OF SYSTEMS     Review of Systems   Constitutional: Negative for activity change, appetite change, chills, fatigue and fever  HENT: Negative for congestion, ear discharge, ear pain, hearing loss, sinus pain, sore throat, tinnitus and trouble swallowing  Eyes: Negative for pain and visual disturbance  Respiratory: Negative for cough, chest tightness, shortness of breath and wheezing  Cardiovascular: Negative for chest pain and leg swelling  Gastrointestinal: Negative for abdominal distention, abdominal pain, anal bleeding and blood in stool  Endocrine: Negative for cold intolerance and heat intolerance  Genitourinary: Negative for difficulty urinating, dysuria and frequency  Musculoskeletal: Negative for arthralgias and myalgias  Skin: Negative for rash  Allergic/Immunologic: Negative for environmental allergies and food allergies  Neurological: Positive for numbness (Bilateral feet)  Negative for dizziness, light-headedness and headaches  Hematological: Negative for adenopathy  Psychiatric/Behavioral: Negative for agitation, behavioral problems and confusion       OBJECTIVE     Vitals:    04/09/21 0801 04/09/21 3598 04/09/21 0852 04/09/21 1120   BP: 120/89 141/84  97/58   Pulse:   (!) 125    Resp: 16 16  16   Temp:  98 6 °F (37 °C)  98 2 °F (36 8 °C)   TempSrc:       SpO2:       Weight:       Height:          Temperature:   Temp (24hrs), Av 5 °F (36 9 °C), Min:98 1 °F (36 7 °C), Max:99 °F (37 2 °C)    Temperature: 98 2 °F (36 8 °C)  Intake & Output:  I/O        07 -  0700  07 -  07 07 - 04/10 0700    P  O  480 760 480    I V  (mL/kg) 1000 (9 3)      IV Piggyback 750      Total Intake(mL/kg) 2230 (20 6) 760 (7) 480 (4 4)    Urine (mL/kg/hr) 200 1200 (0 5) 850 (1 8)    Drains 50 50     Blood 10      Total Output 260 1250 850    Net +9484 -490 -370                 Intake/Output Summary (Last 24 hours) at 2021 1208  Last data filed at 2021 0901  Gross per 24 hour   Intake 700 ml   Output 2200 ml   Net -1500 ml     I/O this shift: In: 480 [P O :480]  Out: 1250 [Urine:1250]   Weights:   IBW: 89 1 kg    Body mass index is 28 11 kg/m²  Weight (last 2 days)     Date/Time   Weight    21 17:14:56   108 (237 08)    21 1117   108 (238)            Physical Exam  Constitutional:       Appearance: He is well-developed  HENT:      Head: Normocephalic and atraumatic  Nose: Nose normal    Eyes:      General:         Right eye: No discharge  Left eye: No discharge  Conjunctiva/sclera: Conjunctivae normal       Pupils: Pupils are equal, round, and reactive to light  Neck:      Thyroid: No thyromegaly  Cardiovascular:      Rate and Rhythm: Tachycardia present  Rhythm irregular  Heart sounds: Normal heart sounds  No murmur  No friction rub  No gallop  Pulmonary:      Effort: Pulmonary effort is normal  No respiratory distress  Breath sounds: Normal breath sounds  No stridor  No wheezing or rales  Chest:      Chest wall: No tenderness  Abdominal:      General: Bowel sounds are normal  There is no distension  Palpations: Abdomen is soft  There is no mass  Tenderness:  There is no abdominal tenderness  There is no guarding or rebound  Musculoskeletal:      Right lower leg: No edema  Left lower leg: No edema  Comments: Right TMA  Both feet are wrapped  Lymphadenopathy:      Cervical: No cervical adenopathy  Skin:     General: Skin is warm and dry  Neurological:      Mental Status: He is alert and oriented to person, place, and time  Sensory: Sensory deficit (Feet bilaterally) present  Psychiatric:         Mood and Affect: Mood normal          Behavior: Behavior normal          Thought Content:  Thought content normal          Judgment: Judgment normal        PAST MEDICAL HISTORY     Past Medical History:   Diagnosis Date    Arthritis     Atrial fibrillation ([facility] )     Diagnosed 11/2018    Benign prostate hyperplasia 04/2002    Cellulitis     right lower leg     Colon polyp 2006    Diabetes mellitus ([facility] )     Hearing aid worn     bilat    Hearing loss     90% loss left ear and 40% right ear    History of clubfoot     "since birth"    History of pneumonia     History of TIA (transient ischemic attack) 04/25/2017 11/30/18 pt denies    Hyperlipidemia 5/15/2014    Hypertension     Infectious viral hepatitis     "cant remember type"    Irregular heart beat     Neuropathy     both feet    Osteomyelitis ([facility] )     right great toe    Right middle lobe pulmonary nodule 11/6/2018    Seasickness     Teeth missing     Type 2 diabetes mellitus with diabetic neuropathy ([facility] ) 11/6/2018    Wears glasses     reading    Wound, open     bottom of right foot     PAST SURGICAL HISTORY     Past Surgical History:   Procedure Laterality Date    BUNIONECTOMY Right 12/4/2018    Procedure: 5TH METATARSAL BONE PARTIAL RESECTION, FULL THICKNESS DEBRIDEMENT OF DIABETIC ULCER;  Surgeon: Lamonte Holter, DPM;  [facility];  Service: Podiatry    CLUB FOOT RELEASE Bilateral     COLONOSCOPY      EXTERNAL FIXATOR APPLICATION Right 5/32/2100    Procedure: Application multiplane external fixation;  Surgeon: Amy Nails DPM;  [facility];  Service: Podiatry    FOOT HARDWARE REMOVAL Right 2/17/2021    Procedure: REMOVAL EXTERNAL FIXATOR;  Surgeon: Amy Nails DPM;  [facility];  Service: Podiatry    INCISION AND DRAINAGE OF WOUND Right 2/17/2021    Procedure: INCISION AND DRAINAGE (I&D) EXTREMITY;  Surgeon: Amy Nails DPM;  [facility];  Service: Podiatry    ORIF FOOT FRACTURE Right 3/4/2021    Procedure: VAC PLACEMENT; SCREW FIXATION RIGHT FOOT FUSION;  Surgeon: Amy Nails DPM;  [facility];  Service: Podiatry    [address] Right 2/22/2021    Procedure: AMPUTATION TRANSMETATARSAL (TMA), REMOVAL NAIL IM T2 ICF HARDWARE;  Surgeon: Amy Nails DPM;  [facility];  Service: Podiatry    IA FUSION FOOT BONES,MIDTARSAL,OSTEOTMY Right 2/12/2021    Procedure: foot ARTHRODESIS/FUSION;  Surgeon: Amy Nails DPM;  [facility];  Service: Podiatry    IA LENGTH/SHORT LEG/ANKL TENDON,SINGLE Right 2/12/2021    Procedure: LENGTHEN TIBIAL TENDON, TRANS HEEL CORD;  Surgeon: Amy Nails DPM;  [facility];  Service: Podiatry    IA PART [address] [facility] BONE METATARSAL HEAD,EA Right 12/29/2020    Procedure: EXCISION EXOSTOSIS;  Surgeon: Amy Nails DPM;  [facility];  Service: [place] MIKED EXTEN/COMPLIC Right 62/20/1817    Procedure: PRIMARY DELAYED CLOSURE;  Surgeon: Amy Nails DPM;  [facility];  Service: Podiatry    TOE AMPUTATION Right     partial great toe    TONSILLECTOMY      VAC DRESSING APPLICATION Right 9/5/9635    Procedure: APPLICATION VAC DRESSING EXTREMITY;  Surgeon: Chloé Andrews MD;  [facility];  Service: Plastics    [address] [facility] Right 3/1/2021    Procedure: DEBRIDEMENT LOWER EXTREMITY ([address] OUT);   Surgeon: Chloé Andrews MD;  [facility];  Service: Plastics    WOUND DEBRIDEMENT Right 2021    Procedure: DEBRIDEMENT RIGHT FOOT;  Surgeon: Rosibel Duarte MD;  [facility];  Service: Plastics     SOCIAL & FAMILY HISTORY     Social History     Substance and Sexual Activity   Alcohol Use Yes    Frequency: 2-4 times a month    Drinks per session: 1 or 2    Comment: socially     Substance and Sexual Activity   Alcohol Use Yes    Frequency: 2-4 times a month    Drinks per session: 1 or 2    Comment: socially        Substance and Sexual Activity   Drug Use Not Currently     Social History     Tobacco Use   Smoking Status Former Smoker    Types: Cigarettes    Quit date: 1988    Years since quittin 4   Smokeless Tobacco Former User   Tobacco Comment    exposure to passive smoke     Family History   Problem Relation Age of Onset    Diabetes Father         mellitus     LABORATORY DATA     Labs: I have personally reviewed pertinent reports  Results from last 7 days   Lab Units 21  0548 21  1613   WBC Thousand/uL 7 55  --    HEMOGLOBIN g/dL 11 6*  --    HEMATOCRIT % 37 4  --    PLATELETS Thousands/uL 267 264      Results from last 7 days   Lab Units 21  0548 21  1613   POTASSIUM mmol/L 3 8 4 0   CHLORIDE mmol/L 109* 110*   CO2 mmol/L 28 25   BUN mg/dL 13 16   CREATININE mg/dL 0 90 0 82   CALCIUM mg/dL 9 3 9 0     Serum creatinine: 0 9 mg/dL 21 0548  Estimated creatinine clearance: 106 mL/min   Results from last 7 days   Lab Units 21  0548 21  1613   MAGNESIUM mg/dL 2 1 2 0     Results from last 7 days   Lab Units 21  1613   PHOSPHORUS mg/dL 2 9                  Micro:  Lab Results   Component Value Date    BLOODCX No Growth After 5 Days  2021    BLOODCX No Growth After 5 Days  2021    BLOODCX No Growth After 5 Days  2018    BLOODCX No Growth After 5 Days   2018    URINECX No Growth <1000 cfu/mL 2019    URINECX >100,000 cfu/ml Escherichia coli (A) 10/25/2019    WOUNDCULT 3+ Growth of Enterobacter cloacae complex (A) 02/17/2021    WOUNDCULT 1+ Growth of  02/17/2021    WOUNDCULT 2+ Growth of Enterobacter cloacae complex (A) 02/17/2021    WOUNDCULT 1+ Growth of Enterococcus faecalis (A) 02/17/2021    WOUNDCULT 1+ Growth of  02/17/2021     IMAGING & DIAGNOSTIC TESTS     Imaging: I have personally reviewed pertinent reports  No results found  EKG, Pathology, and Other Studies: I have personally reviewed pertinent reports  ALLERGIES     Allergies   Allergen Reactions    Simvastatin Myalgia    Itraconazole Other (See Comments)     Pt denies knowledge of allergy  [facility] MEDICATIONS PRIOR TO ARRIVAL     Prior to Admission medications    Medication Sig Start Date End Date Taking?  Authorizing Provider   Acetaminophen (TYLENOL ARTHRITIS PAIN PO) Take 650 mg by mouth 2 (two) times a day   Yes Historical Provider, MD   atorvastatin (LIPITOR) 40 mg tablet Take 1 tablet (40 mg total) by mouth daily 12/16/20  Yes Chari Castro MD   diltiazem (CARDIZEM) 120 MG tablet Take 1 tablet (120 mg total) by mouth 2 (two) times a day 12/16/20  Yes Chari Castro MD   doxycycline hyclate (VIBRAMYCIN) 100 mg capsule Take 1 capsule (100 mg total) by mouth every 12 (twelve) hours 3/10/21 4/15/21 Yes Jayde Pollard DPM   Eliquis 5 MG Take 1 tablet (5 mg total) by mouth 2 (two) times a day 12/16/20  Yes Chari Castro MD   glimepiride (AMARYL) 4 mg tablet Take one tablet by mouth twice daily  Patient taking differently: Take 4 mg by mouth 2 (two) times a day Take one tablet by mouth twice daily 7/31/20  Yes Chari Castro MD   Januvia 100 MG tablet Take 1 tablet (100 mg total) by mouth daily 12/16/20  Yes Chari Castro MD   lisinopril (ZESTRIL) 40 mg tablet Take 1 tablet (40 mg total) by mouth daily 12/16/20  Yes Chari Castro MD   metFORMIN (GLUCOPHAGE) 500 mg tablet Take 500 mg by mouth 2 (two) times a day with meals   Yes Historical Provider, MD   Blood Glucose Monitoring Suppl (ACCU-CHEK JYOTI SMARTVIEW) w/Device KIT use to check Blood Sugar as needed 3/14/16   Historical Provider, MD   glucose blood (Accu-Chek SmartView) test strip Use as instructed BID 2/13/21   Harinder Irby DPM     MEDICATIONS ADMINISTERED IN LAST 24 HOURS     Medication Administration - last 24 hours from 04/08/2021 1208 to 04/09/2021 1208       Date/Time Order Dose Route Action Action by     04/08/2021 1718 atorvastatin (LIPITOR) tablet 40 mg 40 mg Oral Given Esha Cruz, RAVEN     04/09/2021 0815 doxycycline hyclate (VIBRAMYCIN) capsule 100 mg 100 mg Oral Given Esha Cruz, RAVEN     04/08/2021 2119 doxycycline hyclate (VIBRAMYCIN) capsule 100 mg 100 mg Oral Given Maxi Arias, RAVEN     04/09/2021 0815 sitaGLIPtin (JANUVIA) tablet 100 mg 100 mg Oral Given Esha Cruz, RAVEN     04/09/2021 0815 lisinopril (ZESTRIL) tablet 40 mg 40 mg Oral Given Esha Cruz, RAVEN     04/09/2021 0815 glimepiride (AMARYL) tablet 4 mg 4 mg Oral Given Esha Cruz, RN     04/08/2021 2119 glimepiride (AMARYL) tablet 4 mg 4 mg Oral Given Maxi Arias RN     04/09/2021 0815 enoxaparin (LOVENOX) subcutaneous injection 105 mg 105 mg Subcutaneous Given Esha Cruz, RAVEN     04/08/2021 2119 enoxaparin (LOVENOX) subcutaneous injection 105 mg 105 mg Subcutaneous Given Maxi Arias RN     04/08/2021 1718 diltiazem (CARDIZEM) tablet 120 mg 120 mg Oral Given Esha Cruz, RAVEN     04/09/2021 0915 diltiazem (CARDIZEM) tablet 180 mg 180 mg Oral Given Esha Cruz, RAVEN     04/09/2021 0852 metoprolol tartrate (LOPRESSOR) tablet 50 mg 50 mg Oral Given Esha Cruz, RAVEN     04/09/2021 1142 insulin lispro (HumaLOG) 100 units/mL subcutaneous injection 3 Units 3 Units Subcutaneous Given Esha Cruz, RN     04/09/2021 1142 insulin lispro (HumaLOG) 100 units/mL subcutaneous injection 1-6 Units 2 Units Subcutaneous Given Esha Cruz RN        CURRENT MEDICATIONS     Current Facility-Administered Medications   Medication Dose Route Frequency Provider Last Rate    acetaminophen  650 mg Oral Q4H PRN Oneil Ferraro MD      atorvastatin  40 mg Oral Daily With Doe Molina MD      diltiazem  180 mg Oral BID Amy Dent DO      doxycycline hyclate  100 mg Oral Q12H [address] Oneil Ferraro MD      enoxaparin  1 mg/kg Subcutaneous Q12H [address] Oneil Ferraro MD      insulin glargine  10 Units Subcutaneous HS Joon Dias MD      insulin lispro  1-6 Units Subcutaneous TID [facility] MD Bello Alexander ON 4/10/2021] insulin lispro  3 Units Subcutaneous Daily With Breakfast Joon Dias MD      insulin lispro  3 Units Subcutaneous Daily With Lunch Joon Dias MD      insulin lispro  3 Units Subcutaneous Daily With Aziza Cadena MD      lisinopril  40 mg Oral Daily MD Juany Acevedo oxyCODONE  10 mg Oral Q4H PRN Oneil Ferraro MD      oxyCODONE  5 mg Oral Q4H PRN Oneil Ferraro MD          acetaminophen, 650 mg, Q4H PRN  oxyCODONE, 10 mg, Q4H PRN  oxyCODONE, 5 mg, Q4H PRN      Admission Time  I spent 45 minutes admitting the patient    This involved direct patient contact where I performed a full history and physical, reviewing previous records, and reviewing laboratory and other diagnostic studies     ==  Joon Dias MD  IM Resident, PGY-1  [facility] Res[facility]

## 2021-04-09 NOTE — PROGRESS NOTES
Cardiology Progress Note - Ray Kim  73 y o  male MRN: [id number]    Unit/Bed#: [facility] 815-01 Encounter: [id number]        ASSESSMENT/PLAN:    Principal Problem:    Atrial fibrillation with RVR ([facility] )  Active Problems:    Essential hypertension    Acquired absence of other right toe(s) ([facility] )    Deep disruption or dehiscence of operation wound    Ambulatory dysfunction    Diabetic ulcer of right 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, with fat layer exposed ([place] [address] )    S/P transmetatarsal amputation of foot, right ([place] [address] )    1  Atrial fibrillation with rapid ventricular response:  Poor rate control overnight  Diltiazem increased to 180 mg twice a day  Give additional Lopressor this morning  He is not in significant pain  But he remains anxious about the surgical plans  Hold off on discharge till rate is better controlled  Upon discharge he should be on diltiazem 180 mg twice a day  We will assess the need for additional beta blockade preop based on the response to current therapies  Anticoagulation as per primary team     Plan of care discussed with the patient  Subjective:   65-year-old male with atrial fibrillation with rapid ventricular response related to discontinuation of diltiazem  denies palpitations  Continues to have runs of atrial fibrillation with rapid ventricular response overnight and this morning  He has received diltiazem 120 mg at night  Telemetry Review:  Atrial fibrillation with rapid ventricular rate    Review of Systems   Constitutional: Negative for fever  Respiratory: Negative for chest tightness, shortness of breath and wheezing  Cardiovascular: Negative for chest pain, palpitations and leg swelling  Skin: Negative for rash  Neurological: Negative for syncope  Hematological: Does not bruise/bleed easily           Current Facility-Administered Medications:     acetaminophen (TYLENOL) tablet 650 mg, 650 mg, Oral, Q4H PRN, MD Amairani Franklin atorvastatin (LIPITOR) tablet 40 mg, 40 mg, Oral, Daily With Carly Parker MD, 40 mg at 04/08/21 1718    diltiazem (CARDIZEM) tablet 180 mg, 180 mg, Oral, BID, Amy Dent DO    doxycycline hyclate (VIBRAMYCIN) capsule 100 mg, 100 mg, Oral, Q12H [facility], Summer Kern MD, 100 mg at 04/09/21 0815    enoxaparin (LOVENOX) subcutaneous injection 105 mg, 1 mg/kg, Subcutaneous, Q12H [facility], Summer Kern MD, 105 mg at 04/09/21 0815    glimepiride (AMARYL) tablet 4 mg, 4 mg, Oral, BID, Summer Kern MD, 4 mg at 04/09/21 0815    lisinopril (ZESTRIL) tablet 40 mg, 40 mg, Oral, Daily, Summer Kern MD, 40 mg at 04/09/21 0815    metoprolol tartrate (LOPRESSOR) tablet 50 mg, 50 mg, Oral, Once, Sarai Avila MD    oxyCODONE (ROXICODONE) IR tablet 10 mg, 10 mg, Oral, Q4H PRN, Summer Kern MD, 10 mg at 04/08/21 0042    oxyCODONE (ROXICODONE) IR tablet 5 mg, 5 mg, Oral, Q4H PRN, Summer Kern MD    sitaGLIPtin (JANUVIA) tablet 100 mg, 100 mg, Oral, Daily, Summer Kern MD, 100 mg at 04/09/21 0815     Objective:     Vitals:   Blood pressure 141/84, pulse 57, temperature 98 6 °F (37 °C), resp  rate 16, height 6' 5" (1 956 m), weight 108 kg (237 lb 1 3 oz), SpO2 97 %  Body mass index is 28 11 kg/m² ,  Orthostatic Blood Pressures      Most Recent Value   Blood Pressure  141/84 filed at 04/09/2021 2182         Systolic (86XGG), IBW:362 , Min:104 , SSN:849     Diastolic (60DKW), LDI:12, Min:63, Max:96      Intake/Output Summary (Last 24 hours) at 4/9/2021 0848  Last data filed at 4/9/2021 3434  Gross per 24 hour   Intake 1240 ml   Output 1250 ml   Net -10 ml     Weight (last 2 days)     Date/Time   Weight    04/07/21 17:14:56   108 (237 08)    04/07/21 1117   108 (238)              Physical Exam  Constitutional:       General: He is not in acute distress  Appearance: Normal appearance  HENT:      Head: Normocephalic        Mouth/Throat:      Mouth: Mucous membranes are moist    Eyes: Conjunctiva/sclera: Conjunctivae normal    Neck:      Vascular: No carotid bruit  Cardiovascular:      Rate and Rhythm: Tachycardia present  Rhythm irregular  Pulmonary:      Effort: Pulmonary effort is normal       Breath sounds: Normal breath sounds  Abdominal:      Tenderness: There is no abdominal tenderness  Musculoskeletal:      Comments: Lower extremity wound in dressing and vacuum suction in progress   Neurological:      General: No focal deficit present     Psychiatric:         Mood and Affect: Mood normal            Labs & Results:        Results from last 7 days   Lab Units 04/08/21  0548 04/07/21  1613   WBC Thousand/uL 7 55  --    HEMOGLOBIN g/dL 11 6*  --    HEMATOCRIT % 37 4  --    PLATELETS Thousands/uL 267 264         Results from last 7 days   Lab Units 04/08/21  0548 04/07/21  1613   POTASSIUM mmol/L 3 8 4 0   CHLORIDE mmol/L 109* 110*   CO2 mmol/L 28 25   BUN mg/dL 13 16   CREATININE mg/dL 0 90 0 82   CALCIUM mg/dL 9 3 9 0         Results from last 7 days   Lab Units 04/08/21  0548 04/07/21  1613   MAGNESIUM mg/dL 2 1 2 0

## 2021-04-09 NOTE — PROGRESS NOTES
Progress Note - Plastic Surgery   Isidoro Unger  73 y o  male MRN: [id number]  Unit/Bed#: [facility] 815-01 Encounter: [id number]    Assessment:  Principal Problem:    Atrial fibrillation with RVR ([facility] )  Active Problems:    Essential hypertension    Acquired absence of other right toe(s) ([facility] )    Deep disruption or dehiscence of operation wound    Ambulatory dysfunction    Diabetic ulcer of right 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, with fat layer exposed ([facility] )    S/P transmetatarsal amputation of foot, right ([place] [address] )      Plan:  - Appreciated Cardiology's assistance with management of patient  - Continue medical optimization in preparation for free flap to R foot  - OR hopefully next week  - Will likely arrange for DC this AM    Patrick Gillette MD  Resident, Plastic & Reconstructive Surgery  [phone] (c)     Subjective/Objective   Chief Complaint: R foot wound    Subjective: resting in bed  Pain controlled  TATIANA overnight  Objective:     Blood pressure 120/89, pulse 57, temperature 98 1 °F (36 7 °C), resp  rate 16, height 6' 5" (1 956 m), weight 108 kg (237 lb 1 3 oz), SpO2 97 %  ,Body mass index is 28 11 kg/m²  Intake/Output Summary (Last 24 hours) at 4/9/2021 0816  Last data filed at 4/8/2021 2300  Gross per 24 hour   Intake 760 ml   Output 1250 ml   Net -490 ml       Invasive Devices     Peripheral Intravenous Line            Peripheral IV 04/07/21 Left Wrist 1 day          Drain            Negative Pressure Wound Therapy (V A C ) Foot 1 day                Physical Exam:   Gen: NAD  Neuro: Alert, oriented  CV: regular rate  Pulm: no respiratory distress  Abd: nondistended  GI: No Astorga catheter  Ext: RLE VAC to suction without leak     Lab, Imaging and other studies:I have personally reviewed pertinent lab results      VTE Pharmacologic Prophylaxis: Sequential compression device (Venodyne)  and Enoxaparin (Lovenox)  VTE Mechanical Prophylaxis: sequential compression device

## 2021-04-10 LAB
GLUCOSE SERPL-MCNC: 114 MG/DL (ref 65–140)
GLUCOSE SERPL-MCNC: 141 MG/DL (ref 65–140)
GLUCOSE SERPL-MCNC: 182 MG/DL (ref 65–140)
GLUCOSE SERPL-MCNC: 222 MG/DL (ref 65–140)

## 2021-04-10 PROCEDURE — 82948 REAGENT STRIP/BLOOD GLUCOSE: CPT

## 2021-04-10 PROCEDURE — 99231 SBSQ HOSP IP/OBS SF/LOW 25: CPT | Performed by: PLASTIC SURGERY

## 2021-04-10 PROCEDURE — 99232 SBSQ HOSP IP/OBS MODERATE 35: CPT | Performed by: INTERNAL MEDICINE

## 2021-04-10 RX ORDER — METOPROLOL TARTRATE 50 MG/1
50 TABLET, FILM COATED ORAL EVERY 12 HOURS SCHEDULED
Status: DISCONTINUED | OUTPATIENT
Start: 2021-04-10 | End: 2021-04-14

## 2021-04-10 RX ORDER — DEXTROSE, SODIUM CHLORIDE, AND POTASSIUM CHLORIDE 5; .45; .15 G/100ML; G/100ML; G/100ML
125 INJECTION INTRAVENOUS CONTINUOUS
Status: DISCONTINUED | OUTPATIENT
Start: 2021-04-12 | End: 2021-04-14

## 2021-04-10 RX ADMIN — OXYCODONE HYDROCHLORIDE 10 MG: 5 TABLET ORAL at 12:49

## 2021-04-10 RX ADMIN — OXYCODONE HYDROCHLORIDE 5 MG: 5 TABLET ORAL at 18:30

## 2021-04-10 RX ADMIN — INSULIN LISPRO 3 UNITS: 100 INJECTION, SOLUTION INTRAVENOUS; SUBCUTANEOUS at 17:00

## 2021-04-10 RX ADMIN — LISINOPRIL 40 MG: 20 TABLET ORAL at 08:12

## 2021-04-10 RX ADMIN — ENOXAPARIN SODIUM 105 MG: 120 INJECTION SUBCUTANEOUS at 08:14

## 2021-04-10 RX ADMIN — METOPROLOL TARTRATE 50 MG: 50 TABLET, FILM COATED ORAL at 11:38

## 2021-04-10 RX ADMIN — INSULIN LISPRO 3 UNITS: 100 INJECTION, SOLUTION INTRAVENOUS; SUBCUTANEOUS at 11:40

## 2021-04-10 RX ADMIN — DILTIAZEM HYDROCHLORIDE 180 MG: 60 TABLET, FILM COATED ORAL at 17:01

## 2021-04-10 RX ADMIN — DOXYCYCLINE 100 MG: 100 CAPSULE ORAL at 21:32

## 2021-04-10 RX ADMIN — ACETAMINOPHEN 650 MG: 325 TABLET, FILM COATED ORAL at 17:03

## 2021-04-10 RX ADMIN — DOXYCYCLINE 100 MG: 100 CAPSULE ORAL at 08:12

## 2021-04-10 RX ADMIN — INSULIN LISPRO 1 UNITS: 100 INJECTION, SOLUTION INTRAVENOUS; SUBCUTANEOUS at 17:00

## 2021-04-10 RX ADMIN — OXYCODONE HYDROCHLORIDE 10 MG: 5 TABLET ORAL at 08:12

## 2021-04-10 RX ADMIN — DILTIAZEM HYDROCHLORIDE 180 MG: 60 TABLET, FILM COATED ORAL at 08:13

## 2021-04-10 RX ADMIN — INSULIN GLARGINE 10 UNITS: 100 INJECTION, SOLUTION SUBCUTANEOUS at 21:32

## 2021-04-10 RX ADMIN — INSULIN LISPRO 3 UNITS: 100 INJECTION, SOLUTION INTRAVENOUS; SUBCUTANEOUS at 08:14

## 2021-04-10 RX ADMIN — ATORVASTATIN CALCIUM 40 MG: 40 TABLET, FILM COATED ORAL at 17:00

## 2021-04-10 RX ADMIN — ENOXAPARIN SODIUM 105 MG: 120 INJECTION SUBCUTANEOUS at 21:31

## 2021-04-10 RX ADMIN — INSULIN LISPRO 2 UNITS: 100 INJECTION, SOLUTION INTRAVENOUS; SUBCUTANEOUS at 11:40

## 2021-04-10 NOTE — PROGRESS NOTES
Cardiology Progress Note - Kaylan Falcon  73 y o  male MRN: [id number]    Unit/Bed#: [facility] 815-01 Encounter: [id number]        ASSESSMENT/PLAN:    Principal Problem:    Type 2 diabetes mellitus with diabetic neuropathy ([place] [address] )  Active Problems:    Hyperlipidemia    Atrial fibrillation with RVR ([place] [address] )    Essential hypertension    Acquired absence of other right toe(s) ([place] [address] )    Deep disruption or dehiscence of operation wound    Ambulatory dysfunction    Diabetic ulcer of right 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, with fat layer exposed ([place] [address] )    S/P transmetatarsal amputation of foot, right ([facility])    Assessment/plan:    Atrial fibrillation with rapid ventricular response:  Again has gotten into for rate control  Remains asymptomatic  Continue diltiazem 180 mg twice a day  Start Lopressor 50 mg b i d   Monitor for bradycardia  I will follow his telemetry closely  Anticoagulation as per primary team     Surgery planned for Monday  Hope to optimize heart rate before that  Subjective:   Had a good night  No chest pain, no shortness of breath  No palpitations  Developed rapid ventricular response again early this morning  He remains asymptomatic from this  Telemetry Review:  Atrial fib with RVR seen this morning    Review of Systems   Constitutional: Negative for fever  Respiratory: Negative for chest tightness and shortness of breath  Cardiovascular: Negative for chest pain and palpitations           Current Facility-Administered Medications:     acetaminophen (TYLENOL) tablet 650 mg, 650 mg, Oral, Q4H PRN, Bennie Snyder MD    atorvastatin (LIPITOR) tablet 40 mg, 40 mg, Oral, Daily With Adry Mccann MD, 40 mg at 04/09/21 1721    [START ON 4/12/2021] dextrose 5 % and sodium chloride 0 45 % with KCl 20 mEq/L infusion, 125 mL/hr, Intravenous, Continuous, Bennei Snyder MD    diltiazem (CARDIZEM) tablet 180 mg, 180 mg, Oral, BID, Amy Dent DO, 180 mg at 04/10/21 9600    doxycycline hyclate (VIBRAMYCIN) capsule 100 mg, 100 mg, Oral, Q12H [address], Maria Del Rosario Bender MD, 100 mg at 04/10/21 0812    enoxaparin (LOVENOX) subcutaneous injection 105 mg, 1 mg/kg, Subcutaneous, Q12H [address], Maria Del Rosario Bender MD, 105 mg at 04/10/21 0814    insulin glargine (LANTUS) subcutaneous injection 10 Units 0 1 mL, 10 Units, Subcutaneous, HS, Girma Arauz MD, 10 Units at 04/09/21 2250    insulin lispro (HumaLOG) 100 units/mL subcutaneous injection 1-6 Units, 1-6 Units, Subcutaneous, TID AC, 2 Units at 04/09/21 1142 **AND** Fingerstick Glucose (POCT), , , TID AC, Girma Arauz MD    insulin lispro (HumaLOG) 100 units/mL subcutaneous injection 3 Units, 3 Units, Subcutaneous, Daily With Breakfast, Girma Aaruz MD, 3 Units at 04/10/21 0814    insulin lispro (HumaLOG) 100 units/mL subcutaneous injection 3 Units, 3 Units, Subcutaneous, Daily With Lunch, Girma Arauz MD, 3 Units at 04/09/21 1142    insulin lispro (HumaLOG) 100 units/mL subcutaneous injection 3 Units, 3 Units, Subcutaneous, Daily With Iliana Millan MD, 3 Units at 04/09/21 1721    lisinopril (ZESTRIL) tablet 40 mg, 40 mg, Oral, Daily, Maria Del Rosario Bender MD, 40 mg at 04/10/21 5999    oxyCODONE (ROXICODONE) IR tablet 10 mg, 10 mg, Oral, Q4H PRN, Maria Del Rosario Bender MD, 10 mg at 04/10/21 3999    oxyCODONE (ROXICODONE) IR tablet 5 mg, 5 mg, Oral, Q4H PRN, Maria Del Rosario Bender MD     Objective:     Vitals:   Blood pressure 155/97, pulse 55, temperature 97 8 °F (36 6 °C), resp  rate 16, height 6' 5" (1 956 m), weight 108 kg (237 lb 1 3 oz), SpO2 96 %    Body mass index is 28 11 kg/m² ,  Orthostatic Blood Pressures      Most Recent Value   Blood Pressure  155/97 filed at 04/10/2021 2637         Systolic (42UAO), GPJ:822 , Min:97 , BNY:402     Diastolic (07RLF), EQP:43, Min:58, Max:97      Intake/Output Summary (Last 24 hours) at 4/10/2021 0929  Last data filed at 4/10/2021 0801  Gross per 24 hour   Intake 120 ml   Output 875 ml   Net -755 ml     Weight (last 2 days)     None          Physical Exam  Constitutional:       Appearance: Normal appearance  HENT:      Head: Normocephalic  Eyes:      Conjunctiva/sclera: Conjunctivae normal    Cardiovascular:      Rate and Rhythm: Tachycardia present  Rhythm irregular  Pulmonary:      Effort: Pulmonary effort is normal       Breath sounds: Normal breath sounds  Abdominal:      Palpations: Abdomen is soft  Neurological:      General: No focal deficit present             Labs & Results:        Results from last 7 days   Lab Units 04/08/21  0548 04/07/21  1613   WBC Thousand/uL 7 55  --    HEMOGLOBIN g/dL 11 6*  --    HEMATOCRIT % 37 4  --    PLATELETS Thousands/uL 267 264         Results from last 7 days   Lab Units 04/08/21  0548 04/07/21  1613   POTASSIUM mmol/L 3 8 4 0   CHLORIDE mmol/L 109* 110*   CO2 mmol/L 28 25   BUN mg/dL 13 16   CREATININE mg/dL 0 90 0 82   CALCIUM mg/dL 9 3 9 0         Results from last 7 days   Lab Units 04/08/21  0548 04/07/21  1613   MAGNESIUM mg/dL 2 1 2 0

## 2021-04-10 NOTE — PROGRESS NOTES
Progress Note - Plastic Surgery   Niraj Romero  73 y o  male MRN: [id number]  Unit/Bed#: [facility] 815-01 Encounter: [id number]    Assessment:  Principal Problem:    Type 2 diabetes mellitus with diabetic neuropathy ([place] [address] )  Active Problems:    Atrial fibrillation with RVR ([place] [address] )    Hyperlipidemia    Essential hypertension    Acquired absence of other right toe(s) ([place] [address] )    Deep disruption or dehiscence of operation wound    Ambulatory dysfunction    Diabetic ulcer of right 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, with fat layer exposed ([place] [address] )    S/P transmetatarsal amputation of foot, right ([place] [address] )      Plan:  - Appreciated Cardiology's assistance with management of patient  - Continue medical optimization in preparation for free flap to R foot  - OR Monday morning    Luis F Zimmer MD  Resident, Plastic & Reconstructive Surgery  [phone] (c)     Subjective/Objective   Chief Complaint: R foot wound    Subjective: resting in bed  Pain controlled  TATAINA overnight  Objective:     Blood pressure 130/69, pulse 55, temperature 97 6 °F (36 4 °C), resp  rate 17, height 6' 5" (1 956 m), weight 108 kg (237 lb 1 3 oz), SpO2 96 %  ,Body mass index is 28 11 kg/m²  Intake/Output Summary (Last 24 hours) at 4/10/2021 0729  Last data filed at 4/9/2021 2214  Gross per 24 hour   Intake 600 ml   Output 1925 ml   Net -1325 ml       Invasive Devices     Peripheral Intravenous Line            Peripheral IV 04/07/21 Left Wrist 2 days          Drain            Negative Pressure Wound Therapy (V A C ) Foot 2 days                Physical Exam:   Gen: NAD  Neuro: Alert, oriented  CV: regular rate  Pulm: no respiratory distress  Abd: nondistended  GI: No Astorga catheter  Ext: RLE VAC to suction without leak     Lab, Imaging and other studies:I have personally reviewed pertinent lab results      VTE Pharmacologic Prophylaxis: Sequential compression device (Venodyne)  and Enoxaparin (Lovenox)  VTE Mechanical Prophylaxis: sequential compression device

## 2021-04-11 ENCOUNTER — ANESTHESIA EVENT (INPATIENT)
Dept: PERIOP | Facility: HOSPITAL | Age: 71
DRG: 463 | End: 2021-04-11
Payer: COMMERCIAL

## 2021-04-11 LAB
ABO GROUP BLD: NORMAL
BLD GP AB SCN SERPL QL: NEGATIVE
GLUCOSE SERPL-MCNC: 126 MG/DL (ref 65–140)
GLUCOSE SERPL-MCNC: 149 MG/DL (ref 65–140)
GLUCOSE SERPL-MCNC: 200 MG/DL (ref 65–140)
GLUCOSE SERPL-MCNC: 221 MG/DL (ref 65–140)
RH BLD: POSITIVE
SPECIMEN EXPIRATION DATE: NORMAL

## 2021-04-11 PROCEDURE — 99231 SBSQ HOSP IP/OBS SF/LOW 25: CPT | Performed by: PLASTIC SURGERY

## 2021-04-11 PROCEDURE — 86923 COMPATIBILITY TEST ELECTRIC: CPT

## 2021-04-11 PROCEDURE — 99232 SBSQ HOSP IP/OBS MODERATE 35: CPT | Performed by: INTERNAL MEDICINE

## 2021-04-11 PROCEDURE — 82948 REAGENT STRIP/BLOOD GLUCOSE: CPT

## 2021-04-11 PROCEDURE — 86900 BLOOD TYPING SEROLOGIC ABO: CPT | Performed by: SURGERY

## 2021-04-11 PROCEDURE — 86850 RBC ANTIBODY SCREEN: CPT | Performed by: SURGERY

## 2021-04-11 PROCEDURE — 86901 BLOOD TYPING SEROLOGIC RH(D): CPT | Performed by: SURGERY

## 2021-04-11 RX ADMIN — INSULIN LISPRO 2 UNITS: 100 INJECTION, SOLUTION INTRAVENOUS; SUBCUTANEOUS at 17:13

## 2021-04-11 RX ADMIN — ENOXAPARIN SODIUM 105 MG: 120 INJECTION SUBCUTANEOUS at 08:09

## 2021-04-11 RX ADMIN — METOPROLOL TARTRATE 50 MG: 50 TABLET, FILM COATED ORAL at 21:38

## 2021-04-11 RX ADMIN — METOPROLOL TARTRATE 50 MG: 50 TABLET, FILM COATED ORAL at 08:05

## 2021-04-11 RX ADMIN — DOXYCYCLINE 100 MG: 100 CAPSULE ORAL at 08:05

## 2021-04-11 RX ADMIN — DOXYCYCLINE 100 MG: 100 CAPSULE ORAL at 21:38

## 2021-04-11 RX ADMIN — DEXTROSE, SODIUM CHLORIDE, AND POTASSIUM CHLORIDE 125 ML/HR: 5; .45; .15 INJECTION INTRAVENOUS at 23:51

## 2021-04-11 RX ADMIN — INSULIN GLARGINE 10 UNITS: 100 INJECTION, SOLUTION SUBCUTANEOUS at 21:38

## 2021-04-11 RX ADMIN — INSULIN LISPRO 3 UNITS: 100 INJECTION, SOLUTION INTRAVENOUS; SUBCUTANEOUS at 11:31

## 2021-04-11 RX ADMIN — DILTIAZEM HYDROCHLORIDE 180 MG: 60 TABLET, FILM COATED ORAL at 17:11

## 2021-04-11 RX ADMIN — INSULIN LISPRO 2 UNITS: 100 INJECTION, SOLUTION INTRAVENOUS; SUBCUTANEOUS at 11:31

## 2021-04-11 RX ADMIN — ATORVASTATIN CALCIUM 40 MG: 40 TABLET, FILM COATED ORAL at 16:19

## 2021-04-11 RX ADMIN — DILTIAZEM HYDROCHLORIDE 180 MG: 60 TABLET, FILM COATED ORAL at 08:05

## 2021-04-11 RX ADMIN — INSULIN LISPRO 3 UNITS: 100 INJECTION, SOLUTION INTRAVENOUS; SUBCUTANEOUS at 07:56

## 2021-04-11 NOTE — PROGRESS NOTES
Cardiology Progress Note - Isidoro Unger  73 y o  male MRN: [id number]    Unit/Bed#: [facility] 815-01 Encounter: [id number]        ASSESSMENT/PLAN:    Principal Problem:    Type 2 diabetes mellitus with diabetic neuropathy ([place] [address] )  Active Problems:    Hyperlipidemia    Atrial fibrillation with RVR ([place] [address] )    Essential hypertension    Acquired absence of other right toe(s) ([place] [address] )    Deep disruption or dehiscence of operation wound    Ambulatory dysfunction    Diabetic ulcer of right 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, with fat layer exposed ([place] [address] )    S/P transmetatarsal amputation of foot, right ([place] [address] )       1  Atrial fibrillation with rapid ventricular response:   this morning rate is well controlled  He had some bradycardia last night  Rate was 39  He is asymptomatic from this  Continue diltiazem 180 mg twice a day and metoprolol 50 mg twice a day  Please make sure patient gets doses of both medicines before surgery in the morning  If he does get tachycardic in the operating room, intravenous doses of either diltiazem or metoprolol can be used for rate control  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 and will eventually end up with a pacemaker at some point  I did discuss this with the patient  He is not very keen on a pacemaker if he can avoid this  Anticoagulation as per primary team      2  Plastic surgery planned for Monday  Give metoprolol and cardizem tomorrow AM, before surgery  Subjective:   No significant events overnight  No angina or palpitations  Telemetry Review:  AF with RVR intermittently all day  AF with slow VR of 39    once last night    Review of Systems   Constitutional: Negative for fever  Respiratory: Negative for chest tightness, shortness of breath and wheezing  Cardiovascular: Negative for chest pain, palpitations and leg swelling  Skin: Negative for rash  Neurological: Negative for syncope  Hematological: Does not bruise/bleed easily  Current Facility-Administered Medications:     acetaminophen (TYLENOL) tablet 650 mg, 650 mg, Oral, Q4H PRN, Daniel Mata MD, 650 mg at 04/10/21 1703    atorvastatin (LIPITOR) tablet 40 mg, 40 mg, Oral, Daily With Jarvis Clark MD, 40 mg at 04/10/21 1700    [START ON 4/12/2021] dextrose 5 % and sodium chloride 0 45 % with KCl 20 mEq/L infusion, 125 mL/hr, Intravenous, Continuous, Daniel Mata MD    diltiazem (CARDIZEM) tablet 180 mg, 180 mg, Oral, BID, Amy Dent DO, 180 mg at 04/11/21 0805    doxycycline hyclate (VIBRAMYCIN) capsule 100 mg, 100 mg, Oral, Q12H [facility], Daniel Mata MD, 100 mg at 04/11/21 0805    enoxaparin (LOVENOX) subcutaneous injection 105 mg, 1 mg/kg, Subcutaneous, Q12H [facility], Daniel Mata MD, 105 mg at 04/11/21 0809    insulin glargine (LANTUS) subcutaneous injection 10 Units 0 1 mL, 10 Units, Subcutaneous, HS, Elba Barth MD, 10 Units at 04/10/21 2132    insulin lispro (HumaLOG) 100 units/mL subcutaneous injection 1-6 Units, 1-6 Units, Subcutaneous, TID AC, 1 Units at 04/10/21 1700 **AND** Fingerstick Glucose (POCT), , , TID AC, Elba Barth MD    insulin lispro (HumaLOG) 100 units/mL subcutaneous injection 3 Units, 3 Units, Subcutaneous, Daily With Breakfast, Elba Barth MD, 3 Units at 04/11/21 0756    insulin lispro (HumaLOG) 100 units/mL subcutaneous injection 3 Units, 3 Units, Subcutaneous, Daily With Lunch, Elba Barth MD, 3 Units at 04/10/21 1140    insulin lispro (HumaLOG) 100 units/mL subcutaneous injection 3 Units, 3 Units, Subcutaneous, Daily With Ben Shultz MD, 3 Units at 04/10/21 1700    lisinopril (ZESTRIL) tablet 40 mg, 40 mg, Oral, Daily, Daniel Mata MD, 40 mg at 04/11/21 0805    metoprolol tartrate (LOPRESSOR) tablet 50 mg, 50 mg, Oral, Q12H [facility], Jo Nails MD, 50 mg at 04/11/21 0805    oxyCODONE (ROXICODONE) IR tablet 10 mg, 10 mg, Oral, Q4H PRN, Daniel Mata MD, 10 mg at 04/10/21 1249   oxyCODONE (ROXICODONE) IR tablet 5 mg, 5 mg, Oral, Q4H PRN, Fely Jimenez MD, 5 mg at 04/10/21 1830     Objective:     Vitals:   Blood pressure 153/92, pulse 80, temperature 98 1 °F (36 7 °C), temperature source Oral, resp  rate 20, height 6' 5" (1 956 m), weight 108 kg (237 lb 1 3 oz), SpO2 96 %  Body mass index is 28 11 kg/m² ,  Orthostatic Blood Pressures      Most Recent Value   Blood Pressure  153/92 filed at 04/11/2021 0802   Patient Position - Orthostatic VS  Sitting filed at 04/11/2021 9910         Systolic (90JJN), PYL:459 , Min:119 , LAE:812     Diastolic (81WFR), TSB:25, Min:60, Max:92      Intake/Output Summary (Last 24 hours) at 4/11/2021 0920  Last data filed at 4/11/2021 0359  Gross per 24 hour   Intake 420 ml   Output 1325 ml   Net -905 ml     Weight (last 2 days)     None          Physical Exam  Constitutional:       General: He is not in acute distress  Appearance: Normal appearance  HENT:      Head: Normocephalic  Cardiovascular:      Rate and Rhythm: Normal rate  Rhythm irregular  Pulmonary:      Effort: Pulmonary effort is normal       Breath sounds: Normal breath sounds  Abdominal:      Palpations: Abdomen is soft  Neurological:      Mental Status: Mental status is at baseline  Psychiatric:         Mood and Affect: Mood normal            Labs & Results:        Results from last 7 days   Lab Units 04/08/21  0548 04/07/21  1613   WBC Thousand/uL 7 55  --    HEMOGLOBIN g/dL 11 6*  --    HEMATOCRIT % 37 4  --    PLATELETS Thousands/uL 267 264         Results from last 7 days   Lab Units 04/08/21  0548 04/07/21  1613   POTASSIUM mmol/L 3 8 4 0   CHLORIDE mmol/L 109* 110*   CO2 mmol/L 28 25   BUN mg/dL 13 16   CREATININE mg/dL 0 90 0 82   CALCIUM mg/dL 9 3 9 0         Results from last 7 days   Lab Units 04/08/21  0548 04/07/21  1613   MAGNESIUM mg/dL 2 1 2 0     No results for input(s): NTBNP in the last 72 hours           Cardiac testing:     Results for orders placed during the hospital encounter of 18   Echo complete with contrast if indicated    Narrative [address]  [facility] [place], [address] [facility]  [phone]    Transthoracic Echocardiogram  2D, M-mode, Doppler, and Color Doppler    Study date:  2018    Patient: Jordyn Downey  MR number: [id number]  Account number: [de-identified]  : 1950  Age: 79 years  Gender: Male  Status: Inpatient  Location: Bedside  Height: 76 in  Weight: 238 lb  BP: 143/ 112 mmHg    Indications: Atrial fibrillation  Diagnoses: I48 0 - Atrial fibrillation    Sonographer:  JULIAN Coppola  Primary Physician:  Tessa Pfeiffer MD  Referring Physician:  Amanda Cantu DO  Group:  [id number]  Interpreting Physician:  Edwin Barron MD    SUMMARY    LEFT VENTRICLE:  Systolic function was normal  Ejection fraction was estimated to be 55 %  There were no regional wall motion abnormalities  Wall thickness was mildly increased  LEFT ATRIUM:  The atrium was mildly dilated  RIGHT ATRIUM:  The atrium was mildly dilated  IVC, HEPATIC VEINS:  The inferior vena cava was mildly dilated  HISTORY: PRIOR HISTORY: Risk factors: hypertension, diabetes, and a former history of cigarette use (quitting more than one month ago)  PROCEDURE: The procedure was performed at the bedside  This was a routine study  The transthoracic approach was used  The study included complete 2D imaging, M-mode, complete spectral Doppler, and color Doppler  The heart rate was 90 bpm,  at the start of the study  Image quality was adequate  LEFT VENTRICLE: Size was normal  Systolic function was normal  Ejection fraction was estimated to be 55 %  There were no regional wall motion abnormalities  Wall thickness was mildly increased  DOPPLER: Transmitral flow pattern: atrial  fibrillation  RIGHT VENTRICLE: The size was normal  Systolic function was normal     LEFT ATRIUM: The atrium was mildly dilated      RIGHT ATRIUM: The atrium was mildly dilated  MITRAL VALVE: Valve structure was normal  There was normal leaflet separation  DOPPLER: The transmitral velocity was within the normal range  There was no evidence for stenosis  There was no significant regurgitation  AORTIC VALVE: The valve was trileaflet  Leaflets exhibited mildly increased thickness, normal cuspal separation, and sclerosis  DOPPLER: Transaortic velocity was within the normal range  There was no evidence for stenosis  There was no  regurgitation  TRICUSPID VALVE: The valve structure was normal  There was normal leaflet separation  DOPPLER: The transtricuspid velocity was within the normal range  There was no evidence for stenosis  There was no significant regurgitation  The  tricuspid jet envelope definition was inadequate for estimation of RV systolic pressure  PULMONIC VALVE: Leaflets exhibited normal thickness, no calcification, and normal cuspal separation  DOPPLER: The transpulmonic velocity was within the normal range  There was no significant regurgitation  PERICARDIUM: There was no pericardial effusion  AORTA: The root exhibited normal size  SYSTEMIC VEINS: IVC: The inferior vena cava was mildly dilated      SYSTEM MEASUREMENT TABLES    2D  %FS: 38 55 %  Ao Diam: 3 58 cm  EDV(Teich): 169 9 ml  EF(Teich): 68 07 %  ESV(Teich): 54 26 ml  IVSd: 1 07 cm  LA Area: 24 56 cm2  LA Diam: 4 24 cm  LVEDV MOD A4C: 138 09 ml  LVEF MOD A4C: 52 95 %  LVESV MOD A4C: 64 97 ml  LVIDd: 5 85 cm  LVIDs: 3 6 cm  LVLd A4C: 8 81 cm  LVLs A4C: 7 53 cm  LVPWd: 1 12 cm  RA Area: 25 78 cm2  RVIDd: 3 91 cm  SV MOD A4C: 73 12 ml  SV(Teich): 115 65 ml    [employer] Accredited Echocardiography Laboratory    Prepared and electronically signed by    Tony Hedrick MD  Signed [id number] 12:58:05

## 2021-04-11 NOTE — PROGRESS NOTES
Plastic Surgery Attending     Pt seen and examined  Had some bradycardia overnight but doing well     /92 (BP Location: Right arm)   Pulse 80   Temp 98 1 °F (36 7 °C) (Oral)   Resp 20   Ht 6' 5" (1 956 m)   Wt 108 kg (237 lb 1 3 oz)   SpO2 96%   BMI 28 11 kg/m²     RLE: dressing intact     A/P   Appreciate cardiology evaluation and medication adjustment   Pt might need a pacemaker in the future   Will plan for surgery tomorrow RLE reconstruction with free tissue transfer     Shayy Titus   [address]   [place], [place] [address] [place]   Office: [phone]

## 2021-04-11 NOTE — PROGRESS NOTES
INTERNAL MEDICINE RESIDENCY PROGRESS NOTE     Name: Shelia Barnard  Age & Sex: 73 y o  male   MRN: [id number]  Unit/Bed#: [facility] 815-01   Encounter: [id number]  Team: SOD Team B     PATIENT INFORMATION     Name: Shelia Barnard  Age & Sex: 73 y o  male   MRN: [id number]  Hospital Stay Days: 4    ASSESSMENT/PLAN     Principal Problem:    Type 2 diabetes mellitus with diabetic neuropathy ([place] [address] )  Active Problems:    Hyperlipidemia    Atrial fibrillation with RVR ([place] [address] )    Essential hypertension    Acquired absence of other right toe(s) ([place] [address] )    Deep disruption or dehiscence of operation wound    Ambulatory dysfunction    Diabetic ulcer of right 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, with fat layer exposed ([place] [address] )    S/P transmetatarsal amputation of foot, right ([place] [address] )      Diabetic ulcer of right 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, with fat layer exposed [facility])  Assessment & Plan  Lab Results   Component Value Date    HGBA1C 7 9 (H) 03/17/2021       Recent Labs     04/10/21  1559 04/10/21  2045 04/11/21  0755 04/11/21  1130   POCGLU 182* 114 149* 221*       Blood Sugar Average: Last 72 hrs:  (P) 177 9     Plan    -  Pre lunch  Blood sugars elevated will increase lispro from 3 units to 5 units  -  Continue current regimen  Continue to monitor blood sugar    Essential hypertension  Assessment & Plan  Plan  -continue home lisinopril 40 mg daily  -continue diltiazem 180 mg b i d  Atrial fibrillation with RVR ([facility] )  Assessment & Plan  patient was scheduled to undergo elective right foot debridement and free flap reconstruction however this procedure was aborted due to AFib with RVR and hypertension during induction  Plan  -cardiology is following  -continue diltiazem 180 mg b i d    -metoprolo 50 mg q12  -home Eliquis 5 mg b i d   Currently on hold  -continue telemetry monitoring    Hyperlipidemia  Assessment & Plan  Plan   -continue home Lipitor 40 mg daily    * Type 2 diabetes mellitus with diabetic neuropathy [facility])  Assessment & Plan  Lab Results   Component Value Date    HGBA1C 7 9 (H) 2021       Recent Labs     04/10/21  1559 04/10/21  2045 21  0755 21  1130   POCGLU 182* 114 149* 221*       Blood Sugar Average: Last 72 hrs:  (P) 177 9     Plan  -patient was previously on home glimepiride 4 mg b i d  and home sitagliptin 100 mg daily -> stopped today  -home metformin on hold  -patient does not take insulin at home   - continue insulin glargine 10 units HS, lispro 3 units  And increase pre lunch insulin to 5 units  With meals, and sliding scale coverage  -continue to monitor blood glucose and adjust insulin regimen as needed      Disposition:   Plan for surgery on 2021    SUBJECTIVE     Patient seen and examined  No complaint at this time  OBJECTIVE     Vitals:    21 0307 21 0802 21 1135 21 1137   BP: 138/90 153/92 105/58 101/55   BP Location:  Right arm     Pulse:  80     Resp: 18 20     Temp: 98 4 °F (36 9 °C) 98 1 °F (36 7 °C) 97 9 °F (36 6 °C) 97 9 °F (36 6 °C)   TempSrc: Oral Oral     SpO2:       Weight:       Height:          Temperature:   Temp (24hrs), Av 1 °F (36 7 °C), Min:97 7 °F (36 5 °C), Max:98 6 °F (37 °C)    Temperature: 97 9 °F (36 6 °C)  Intake & Output:  I/O       701 - 04/10 0700 04/10 07 -  0700  07 -  0700    P  O  600 600     Total Intake(mL/kg) 600 (5 6) 600 (5 6)     Urine (mL/kg/hr) 1925 (0 7) 1475 (0 6)     Drains  50     Stool 0      Total Output 1925 1525     Net -1325 -925            Unmeasured Stool Occurrence 1 x          Weights:   IBW: 89 1 kg    Body mass index is 28 11 kg/m²  Weight (last 2 days)     None        Physical Exam  Vitals signs and nursing note reviewed  Constitutional:       Appearance: He is well-developed  HENT:      Head: Normocephalic and atraumatic  Eyes:      Conjunctiva/sclera: Conjunctivae normal    Neck:      Musculoskeletal: Neck supple     Cardiovascular:      Rate and Rhythm: Normal rate and regular rhythm  Heart sounds: No murmur  Pulmonary:      Effort: Pulmonary effort is normal  No respiratory distress  Breath sounds: Normal breath sounds  Abdominal:      Palpations: Abdomen is soft  Tenderness: There is no abdominal tenderness  Musculoskeletal:      Comments: R TMA   Skin:     General: Skin is warm and dry  Neurological:      Mental Status: He is alert  LABORATORY DATA     Labs: I have personally reviewed pertinent reports  Results from last 7 days   Lab Units 04/08/21  0548 04/07/21  1613   WBC Thousand/uL 7 55  --    HEMOGLOBIN g/dL 11 6*  --    HEMATOCRIT % 37 4  --    PLATELETS Thousands/uL 267 264      Results from last 7 days   Lab Units 04/08/21  0548 04/07/21  1613   POTASSIUM mmol/L 3 8 4 0   CHLORIDE mmol/L 109* 110*   CO2 mmol/L 28 25   BUN mg/dL 13 16   CREATININE mg/dL 0 90 0 82   CALCIUM mg/dL 9 3 9 0     Results from last 7 days   Lab Units 04/08/21  0548 04/07/21  1613   MAGNESIUM mg/dL 2 1 2 0     Results from last 7 days   Lab Units 04/07/21  1613   PHOSPHORUS mg/dL 2 9                    IMAGING & DIAGNOSTIC TESTING     Radiology Results: I have personally reviewed pertinent reports  No results found  Other Diagnostic Testing: I have personally reviewed pertinent reports      ACTIVE MEDICATIONS     Current Facility-Administered Medications   Medication Dose Route Frequency    acetaminophen (TYLENOL) tablet 650 mg  650 mg Oral Q4H PRN    atorvastatin (LIPITOR) tablet 40 mg  40 mg Oral Daily With Dinner    [START ON 4/12/2021] dextrose 5 % and sodium chloride 0 45 % with KCl 20 mEq/L infusion  125 mL/hr Intravenous Continuous    diltiazem (CARDIZEM) tablet 180 mg  180 mg Oral BID    doxycycline hyclate (VIBRAMYCIN) capsule 100 mg  100 mg Oral Q12H [facility]    insulin glargine (LANTUS) subcutaneous injection 10 Units 0 1 mL  10 Units Subcutaneous HS    insulin lispro (HumaLOG) 100 units/mL subcutaneous injection 1-6 Units 1-6 Units Subcutaneous TID AC    insulin lispro (HumaLOG) 100 units/mL subcutaneous injection 3 Units  3 Units Subcutaneous Daily With Breakfast    [START ON 4/12/2021] insulin lispro (HumaLOG) 100 units/mL subcutaneous injection 3 Units  3 Units Subcutaneous Daily With Lunch    insulin lispro (HumaLOG) 100 units/mL subcutaneous injection 5 Units  5 Units Subcutaneous Daily With Dinner    lisinopril (ZESTRIL) tablet 40 mg  40 mg Oral Daily    metoprolol tartrate (LOPRESSOR) tablet 50 mg  50 mg Oral Q12H [address]    oxyCODONE (ROXICODONE) IR tablet 10 mg  10 mg Oral Q4H PRN    oxyCODONE (ROXICODONE) IR tablet 5 mg  5 mg Oral Q4H PRN       Portions of the record may have been created with voice recognition software  Occasional wrong word or "sound a like" substitutions may have occurred due to the inherent limitations of voice recognition software    Read the chart carefully and recognize, using context, where substitutions have occurred   ==  Kylie Brown, [facility]  Internal Medicine Residency PGY-2

## 2021-04-11 NOTE — ANESTHESIA PREPROCEDURE EVALUATION
Procedure:  DEBRIDEMENT LOWER EXTREMITY ([address] OUT) (Right Foot)  RECONSTRUCTION MICROSURGICAL W/ FREE FLAP (Right Foot)    Relevant Problems   ANESTHESIA (within normal limits)      CARDIO   (+) Atrial fibrillation with RVR (HCC)   (+) Essential hypertension   (+) Hyperlipidemia      ENDO   (+) Type 2 diabetes mellitus with diabetic neuropathy (HCC)      GI/HEPATIC (within normal limits)      /RENAL   (+) Benign prostatic hyperplasia with lower urinary tract symptoms      HEMATOLOGY (within normal limits)      NEURO/PSYCH   (+) Type 2 diabetes mellitus with diabetic neuropathy (HCC)      PULMONARY (within normal limits)      Other   (+) Deep disruption or dehiscence of operation wound   (+) Diabetic ulcer of right 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, with fat layer exposed (HCC)   (+) Right middle lobe pulmonary nodule   (+) S/P transmetatarsal amputation of foot, right ([facility] )      2/22/2021:  Atrial fibrillation with rapid ventricular response  Nonspecific ST abnormality  Abnormal ECG  When compared with ECG of 17-FEB-2021 20:08,  No significant change was found     TTE 2018:  LEFT VENTRICLE:  Systolic function was normal  Ejection fraction was estimated to be 55 %  There were no regional wall motion abnormalities  Wall thickness was mildly increased      LEFT ATRIUM:  The atrium was mildly dilated      RIGHT ATRIUM:  The atrium was mildly dilated      IVC, HEPATIC VEINS:  The inferior vena cava was mildly dilated        Lab Results   Component Value Date    WBC 7 55 04/08/2021    HGB 11 6 (L) 04/08/2021    HCT 37 4 04/08/2021    MCV 97 04/08/2021     04/08/2021     Lab Results   Component Value Date    SODIUM 141 04/08/2021    K 3 8 04/08/2021     (H) 04/08/2021    CO2 28 04/08/2021    BUN 13 04/08/2021    CREATININE 0 90 04/08/2021    GLUC 114 04/08/2021    CALCIUM 9 3 04/08/2021     Lab Results   Component Value Date    INR 1 36 (H) 02/18/2021    INR 1 47 (H) 02/17/2021    INR 1 48 (H) 02/16/2021    PROTIME 16 7 (H) 02/18/2021    PROTIME 17 8 (H) 02/17/2021    PROTIME 17 9 (H) 02/16/2021     Lab Results   Component Value Date    HGBA1C 7 9 (H) 03/17/2021          Physical Exam    Airway    Mallampati score: II  TM Distance: >3 FB  Neck ROM: full     Dental   No notable dental hx     Cardiovascular  Cardiovascular exam normal    Pulmonary  Pulmonary exam normal     Other Findings        Anesthesia Plan  ASA Score- 3     Anesthesia Type- general with ASA Monitors  Additional Monitors: arterial line  Airway Plan: ETT  Plan Factors-    Chart reviewed  EKG reviewed  Existing labs reviewed  Patient summary reviewed  Induction- intravenous  Postoperative Plan-     Informed Consent- Anesthetic plan and risks discussed with patient  I personally reviewed this patient with the CRNA  Discussed and agreed on the Anesthesia Plan with the CRNA Gerhardt Sep

## 2021-04-12 ENCOUNTER — ANESTHESIA EVENT (INPATIENT)
Dept: PERIOP | Facility: HOSPITAL | Age: 71
DRG: 463 | End: 2021-04-12
Payer: COMMERCIAL

## 2021-04-12 ENCOUNTER — ANESTHESIA (INPATIENT)
Dept: PERIOP | Facility: HOSPITAL | Age: 71
DRG: 463 | End: 2021-04-12
Payer: COMMERCIAL

## 2021-04-12 LAB
ALBUMIN SERPL BCP-MCNC: 3.5 G/DL (ref 3.5–5)
ALP SERPL-CCNC: 111 U/L (ref 46–116)
ALT SERPL W P-5'-P-CCNC: 59 U/L (ref 12–78)
ANION GAP SERPL CALCULATED.3IONS-SCNC: 4 MMOL/L (ref 4–13)
ANION GAP SERPL CALCULATED.3IONS-SCNC: 5 MMOL/L (ref 4–13)
APTT PPP: 34 SECONDS (ref 23–37)
AST SERPL W P-5'-P-CCNC: 21 U/L (ref 5–45)
BASE EXCESS BLDA CALC-SCNC: -2 MMOL/L (ref -2–3)
BASE EXCESS BLDA CALC-SCNC: -3 MMOL/L (ref -2–3)
BILIRUB SERPL-MCNC: 0.33 MG/DL (ref 0.2–1)
BUN SERPL-MCNC: 11 MG/DL (ref 5–25)
BUN SERPL-MCNC: 16 MG/DL (ref 5–25)
CA-I BLD-SCNC: 1.17 MMOL/L (ref 1.12–1.32)
CA-I BLD-SCNC: 1.17 MMOL/L (ref 1.12–1.32)
CA-I BLD-SCNC: 1.19 MMOL/L (ref 1.12–1.32)
CA-I BLD-SCNC: 1.2 MMOL/L (ref 1.12–1.32)
CALCIUM SERPL-MCNC: 8.3 MG/DL (ref 8.3–10.1)
CALCIUM SERPL-MCNC: 9 MG/DL (ref 8.3–10.1)
CHLORIDE SERPL-SCNC: 110 MMOL/L (ref 100–108)
CHLORIDE SERPL-SCNC: 113 MMOL/L (ref 100–108)
CO2 SERPL-SCNC: 22 MMOL/L (ref 21–32)
CO2 SERPL-SCNC: 24 MMOL/L (ref 21–32)
CREAT SERPL-MCNC: 0.98 MG/DL (ref 0.6–1.3)
CREAT SERPL-MCNC: 1.04 MG/DL (ref 0.6–1.3)
ERYTHROCYTE [DISTWIDTH] IN BLOOD BY AUTOMATED COUNT: 15.1 % (ref 11.6–15.1)
ERYTHROCYTE [DISTWIDTH] IN BLOOD BY AUTOMATED COUNT: 15.4 % (ref 11.6–15.1)
GFR SERPL CREATININE-BSD FRML MDRD: 73 ML/MIN/1.73SQ M
GFR SERPL CREATININE-BSD FRML MDRD: 78 ML/MIN/1.73SQ M
GLUCOSE SERPL-MCNC: 158 MG/DL (ref 65–140)
GLUCOSE SERPL-MCNC: 161 MG/DL (ref 65–140)
GLUCOSE SERPL-MCNC: 162 MG/DL (ref 65–140)
GLUCOSE SERPL-MCNC: 174 MG/DL (ref 65–140)
GLUCOSE SERPL-MCNC: 176 MG/DL (ref 65–140)
GLUCOSE SERPL-MCNC: 184 MG/DL (ref 65–140)
GLUCOSE SERPL-MCNC: 193 MG/DL (ref 65–140)
GLUCOSE SERPL-MCNC: 219 MG/DL (ref 65–140)
GLUCOSE SERPL-MCNC: 277 MG/DL (ref 65–140)
GLUCOSE SERPL-MCNC: 310 MG/DL (ref 65–140)
HCO3 BLDA-SCNC: 22.3 MMOL/L (ref 22–28)
HCO3 BLDA-SCNC: 22.4 MMOL/L (ref 22–28)
HCO3 BLDA-SCNC: 22.7 MMOL/L (ref 22–28)
HCO3 BLDA-SCNC: 23.4 MMOL/L (ref 22–28)
HCT VFR BLD AUTO: 36.6 % (ref 36.5–49.3)
HCT VFR BLD AUTO: 37.2 % (ref 36.5–49.3)
HCT VFR BLD CALC: 30 % (ref 36.5–49.3)
HCT VFR BLD CALC: 32 % (ref 36.5–49.3)
HCT VFR BLD CALC: 32 % (ref 36.5–49.3)
HCT VFR BLD CALC: 34 % (ref 36.5–49.3)
HGB BLD-MCNC: 11.5 G/DL (ref 12–17)
HGB BLD-MCNC: 11.6 G/DL (ref 12–17)
HGB BLDA-MCNC: 10.2 G/DL (ref 12–17)
HGB BLDA-MCNC: 10.9 G/DL (ref 12–17)
HGB BLDA-MCNC: 10.9 G/DL (ref 12–17)
HGB BLDA-MCNC: 11.6 G/DL (ref 12–17)
INR PPP: 1.25 (ref 0.84–1.19)
MAGNESIUM SERPL-MCNC: 2 MG/DL (ref 1.6–2.6)
MAGNESIUM SERPL-MCNC: 2.1 MG/DL (ref 1.6–2.6)
MCH RBC QN AUTO: 29.6 PG (ref 26.8–34.3)
MCH RBC QN AUTO: 30 PG (ref 26.8–34.3)
MCHC RBC AUTO-ENTMCNC: 31.2 G/DL (ref 31.4–37.4)
MCHC RBC AUTO-ENTMCNC: 31.4 G/DL (ref 31.4–37.4)
MCV RBC AUTO: 94 FL (ref 82–98)
MCV RBC AUTO: 96 FL (ref 82–98)
PCO2 BLD: 24 MMOL/L (ref 21–32)
PCO2 BLD: 25 MMOL/L (ref 21–32)
PCO2 BLD: 40.3 MM HG (ref 36–44)
PCO2 BLD: 41.4 MM HG (ref 36–44)
PCO2 BLD: 42 MM HG (ref 36–44)
PCO2 BLD: 42.2 MM HG (ref 36–44)
PH BLD: 7.33 [PH] (ref 7.35–7.45)
PH BLD: 7.34 [PH] (ref 7.35–7.45)
PH BLD: 7.35 [PH] (ref 7.35–7.45)
PH BLD: 7.36 [PH] (ref 7.35–7.45)
PHOSPHATE SERPL-MCNC: 2.5 MG/DL (ref 2.3–4.1)
PHOSPHATE SERPL-MCNC: 3.5 MG/DL (ref 2.3–4.1)
PLATELET # BLD AUTO: 248 THOUSANDS/UL (ref 149–390)
PLATELET # BLD AUTO: 256 THOUSANDS/UL (ref 149–390)
PMV BLD AUTO: 10.6 FL (ref 8.9–12.7)
PMV BLD AUTO: 10.8 FL (ref 8.9–12.7)
PO2 BLD: 151 MM HG (ref 75–129)
PO2 BLD: 153 MM HG (ref 75–129)
PO2 BLD: 156 MM HG (ref 75–129)
PO2 BLD: 164 MM HG (ref 75–129)
POTASSIUM BLD-SCNC: 3.9 MMOL/L (ref 3.5–5.3)
POTASSIUM BLD-SCNC: 4.4 MMOL/L (ref 3.5–5.3)
POTASSIUM BLD-SCNC: 4.5 MMOL/L (ref 3.5–5.3)
POTASSIUM BLD-SCNC: 4.5 MMOL/L (ref 3.5–5.3)
POTASSIUM SERPL-SCNC: 3.9 MMOL/L (ref 3.5–5.3)
POTASSIUM SERPL-SCNC: 4.5 MMOL/L (ref 3.5–5.3)
PROT SERPL-MCNC: 6.9 G/DL (ref 6.4–8.2)
PROTHROMBIN TIME: 15.7 SECONDS (ref 11.6–14.5)
RBC # BLD AUTO: 3.87 MILLION/UL (ref 3.88–5.62)
RBC # BLD AUTO: 3.88 MILLION/UL (ref 3.88–5.62)
SAO2 % BLD FROM PO2: 99 % (ref 60–85)
SODIUM BLD-SCNC: 142 MMOL/L (ref 136–145)
SODIUM BLD-SCNC: 142 MMOL/L (ref 136–145)
SODIUM BLD-SCNC: 143 MMOL/L (ref 136–145)
SODIUM BLD-SCNC: 143 MMOL/L (ref 136–145)
SODIUM SERPL-SCNC: 138 MMOL/L (ref 136–145)
SODIUM SERPL-SCNC: 140 MMOL/L (ref 136–145)
SPECIMEN SOURCE: ABNORMAL
WBC # BLD AUTO: 10.62 THOUSAND/UL (ref 4.31–10.16)
WBC # BLD AUTO: 7.96 THOUSAND/UL (ref 4.31–10.16)

## 2021-04-12 PROCEDURE — 82330 ASSAY OF CALCIUM: CPT

## 2021-04-12 PROCEDURE — 0HRMX73 REPLACEMENT OF RIGHT FOOT SKIN WITH AUTOLOGOUS TISSUE SUBSTITUTE, FULL THICKNESS, EXTERNAL APPROACH: ICD-10-PCS | Performed by: PLASTIC SURGERY

## 2021-04-12 PROCEDURE — 85027 COMPLETE CBC AUTOMATED: CPT | Performed by: SURGERY

## 2021-04-12 PROCEDURE — 85014 HEMATOCRIT: CPT

## 2021-04-12 PROCEDURE — 84295 ASSAY OF SERUM SODIUM: CPT

## 2021-04-12 PROCEDURE — 80048 BASIC METABOLIC PNL TOTAL CA: CPT | Performed by: SURGERY

## 2021-04-12 PROCEDURE — 35876 REMOVAL OF CLOT IN GRAFT: CPT | Performed by: PLASTIC SURGERY

## 2021-04-12 PROCEDURE — 80053 COMPREHEN METABOLIC PANEL: CPT | Performed by: INTERNAL MEDICINE

## 2021-04-12 PROCEDURE — 82947 ASSAY GLUCOSE BLOOD QUANT: CPT

## 2021-04-12 PROCEDURE — 64708 REVISE ARM/LEG NERVE: CPT | Performed by: PLASTIC SURGERY

## 2021-04-12 PROCEDURE — 0JBQ0ZZ EXCISION OF RIGHT FOOT SUBCUTANEOUS TISSUE AND FASCIA, OPEN APPROACH: ICD-10-PCS | Performed by: PLASTIC SURGERY

## 2021-04-12 PROCEDURE — 01BH0ZZ EXCISION OF PERONEAL NERVE, OPEN APPROACH: ICD-10-PCS | Performed by: PLASTIC SURGERY

## 2021-04-12 PROCEDURE — 82803 BLOOD GASES ANY COMBINATION: CPT

## 2021-04-12 PROCEDURE — 82948 REAGENT STRIP/BLOOD GLUCOSE: CPT

## 2021-04-12 PROCEDURE — 85730 THROMBOPLASTIN TIME PARTIAL: CPT | Performed by: SURGERY

## 2021-04-12 PROCEDURE — 84132 ASSAY OF SERUM POTASSIUM: CPT

## 2021-04-12 PROCEDURE — 85610 PROTHROMBIN TIME: CPT | Performed by: SURGERY

## 2021-04-12 PROCEDURE — 83735 ASSAY OF MAGNESIUM: CPT | Performed by: INTERNAL MEDICINE

## 2021-04-12 PROCEDURE — 83735 ASSAY OF MAGNESIUM: CPT | Performed by: SURGERY

## 2021-04-12 PROCEDURE — 061T0ZY BYPASS RIGHT FOOT VEIN TO LOWER VEIN, OPEN APPROACH: ICD-10-PCS | Performed by: PLASTIC SURGERY

## 2021-04-12 PROCEDURE — 85027 COMPLETE CBC AUTOMATED: CPT | Performed by: INTERNAL MEDICINE

## 2021-04-12 PROCEDURE — 84100 ASSAY OF PHOSPHORUS: CPT | Performed by: SURGERY

## 2021-04-12 PROCEDURE — 99024 POSTOP FOLLOW-UP VISIT: CPT | Performed by: PLASTIC SURGERY

## 2021-04-12 PROCEDURE — 84100 ASSAY OF PHOSPHORUS: CPT | Performed by: INTERNAL MEDICINE

## 2021-04-12 PROCEDURE — 15756 FREE MYO/SKIN FLAP MICROVASC: CPT | Performed by: PLASTIC SURGERY

## 2021-04-12 DEVICE — THE FLOWCOUPLER SYSTEM CONSISTS OF A FLOWCOUPLER DEVICE AND A FLOWCOUPLER MONITOR. THE FLOWCOUPLER DEVICE INCLUDES A 20MHZ ULTRASONIC DOPPLER TRANSDUCER (PROBE) ATTACHED TO ONE OF THE FLOWCOUPLER RINGS, AND AN EXTERNAL LEAD. THE FLOWCOUPLER RINGS ARE MADE OF HIGH DENSITY POLYETHYLENE AND SURGICAL GRADE STAINLESS STEEL PINS. A PROTECTIVE COVER AND JAW ASSEMBLY PROTECT THE RINGS AND PROBE WHICH ALLOW FOR EASY LOADING ONTO THE ANASTOMOTIC INSTRUMENT. BOTH THE PROTECTIVE COVER AND JAW ASSEMBLY ARE DISPOSABLE.
Type: IMPLANTABLE DEVICE | Site: FOOT | Status: FUNCTIONAL
Brand: GEM FLOW COUPLER

## 2021-04-12 RX ORDER — AMOXICILLIN 250 MG
1 CAPSULE ORAL
Status: DISCONTINUED | OUTPATIENT
Start: 2021-04-13 | End: 2021-04-30 | Stop reason: HOSPADM

## 2021-04-12 RX ORDER — ESMOLOL HYDROCHLORIDE 10 MG/ML
INJECTION INTRAVENOUS AS NEEDED
Status: DISCONTINUED | OUTPATIENT
Start: 2021-04-12 | End: 2021-04-12

## 2021-04-12 RX ORDER — KETAMINE HYDROCHLORIDE 50 MG/ML
INJECTION, SOLUTION, CONCENTRATE INTRAMUSCULAR; INTRAVENOUS AS NEEDED
Status: DISCONTINUED | OUTPATIENT
Start: 2021-04-12 | End: 2021-04-12

## 2021-04-12 RX ORDER — ONDANSETRON 2 MG/ML
INJECTION INTRAMUSCULAR; INTRAVENOUS AS NEEDED
Status: DISCONTINUED | OUTPATIENT
Start: 2021-04-12 | End: 2021-04-12

## 2021-04-12 RX ORDER — NEOSTIGMINE METHYLSULFATE 1 MG/ML
INJECTION INTRAVENOUS AS NEEDED
Status: DISCONTINUED | OUTPATIENT
Start: 2021-04-12 | End: 2021-04-12

## 2021-04-12 RX ORDER — MAGNESIUM HYDROXIDE 1200 MG/15ML
LIQUID ORAL AS NEEDED
Status: DISCONTINUED | OUTPATIENT
Start: 2021-04-12 | End: 2021-04-13 | Stop reason: HOSPADM

## 2021-04-12 RX ORDER — GINSENG 100 MG
CAPSULE ORAL AS NEEDED
Status: DISCONTINUED | OUTPATIENT
Start: 2021-04-12 | End: 2021-04-12 | Stop reason: HOSPADM

## 2021-04-12 RX ORDER — HEPARIN SODIUM 1000 [USP'U]/ML
4000 INJECTION, SOLUTION INTRAVENOUS; SUBCUTANEOUS
Status: DISCONTINUED | OUTPATIENT
Start: 2021-04-12 | End: 2021-04-13

## 2021-04-12 RX ORDER — DEXAMETHASONE SODIUM PHOSPHATE 10 MG/ML
INJECTION, SOLUTION INTRAMUSCULAR; INTRAVENOUS AS NEEDED
Status: DISCONTINUED | OUTPATIENT
Start: 2021-04-12 | End: 2021-04-12

## 2021-04-12 RX ORDER — ASPIRIN 325 MG
325 TABLET ORAL DAILY
Status: DISCONTINUED | OUTPATIENT
Start: 2021-04-12 | End: 2021-04-30 | Stop reason: HOSPADM

## 2021-04-12 RX ORDER — CEFAZOLIN SODIUM 2 G/50ML
SOLUTION INTRAVENOUS AS NEEDED
Status: DISCONTINUED | OUTPATIENT
Start: 2021-04-12 | End: 2021-04-12

## 2021-04-12 RX ORDER — FENTANYL CITRATE/PF 50 MCG/ML
25 SYRINGE (ML) INJECTION
Status: DISCONTINUED | OUTPATIENT
Start: 2021-04-12 | End: 2021-04-12 | Stop reason: HOSPADM

## 2021-04-12 RX ORDER — DOCUSATE SODIUM 100 MG/1
100 CAPSULE, LIQUID FILLED ORAL 2 TIMES DAILY
Status: DISCONTINUED | OUTPATIENT
Start: 2021-04-13 | End: 2021-04-30 | Stop reason: HOSPADM

## 2021-04-12 RX ORDER — LIDOCAINE HYDROCHLORIDE 10 MG/ML
INJECTION, SOLUTION EPIDURAL; INFILTRATION; INTRACAUDAL; PERINEURAL AS NEEDED
Status: DISCONTINUED | OUTPATIENT
Start: 2021-04-12 | End: 2021-04-12

## 2021-04-12 RX ORDER — ONDANSETRON 2 MG/ML
4 INJECTION INTRAMUSCULAR; INTRAVENOUS EVERY 6 HOURS PRN
Status: DISCONTINUED | OUTPATIENT
Start: 2021-04-12 | End: 2021-04-30 | Stop reason: HOSPADM

## 2021-04-12 RX ORDER — SODIUM CHLORIDE, SODIUM LACTATE, POTASSIUM CHLORIDE, CALCIUM CHLORIDE 600; 310; 30; 20 MG/100ML; MG/100ML; MG/100ML; MG/100ML
INJECTION, SOLUTION INTRAVENOUS CONTINUOUS PRN
Status: DISCONTINUED | OUTPATIENT
Start: 2021-04-12 | End: 2021-04-12

## 2021-04-12 RX ORDER — FENTANYL CITRATE 50 UG/ML
INJECTION, SOLUTION INTRAMUSCULAR; INTRAVENOUS AS NEEDED
Status: DISCONTINUED | OUTPATIENT
Start: 2021-04-12 | End: 2021-04-13

## 2021-04-12 RX ORDER — GLYCOPYRROLATE 0.2 MG/ML
INJECTION INTRAMUSCULAR; INTRAVENOUS AS NEEDED
Status: DISCONTINUED | OUTPATIENT
Start: 2021-04-12 | End: 2021-04-12

## 2021-04-12 RX ORDER — EPHEDRINE SULFATE 50 MG/ML
INJECTION INTRAVENOUS AS NEEDED
Status: DISCONTINUED | OUTPATIENT
Start: 2021-04-12 | End: 2021-04-12

## 2021-04-12 RX ORDER — HEPARIN SODIUM 10000 [USP'U]/100ML
3-20 INJECTION, SOLUTION INTRAVENOUS
Status: DISCONTINUED | OUTPATIENT
Start: 2021-04-12 | End: 2021-04-13

## 2021-04-12 RX ORDER — DILTIAZEM HYDROCHLORIDE 5 MG/ML
15 INJECTION INTRAVENOUS ONCE
Status: COMPLETED | OUTPATIENT
Start: 2021-04-12 | End: 2021-04-12

## 2021-04-12 RX ORDER — SODIUM CHLORIDE 9 MG/ML
INJECTION, SOLUTION INTRAVENOUS CONTINUOUS PRN
Status: DISCONTINUED | OUTPATIENT
Start: 2021-04-12 | End: 2021-04-12

## 2021-04-12 RX ORDER — ONDANSETRON 2 MG/ML
4 INJECTION INTRAMUSCULAR; INTRAVENOUS ONCE AS NEEDED
Status: DISCONTINUED | OUTPATIENT
Start: 2021-04-12 | End: 2021-04-12 | Stop reason: HOSPADM

## 2021-04-12 RX ORDER — DEXMEDETOMIDINE HYDROCHLORIDE 100 UG/ML
INJECTION, SOLUTION INTRAVENOUS AS NEEDED
Status: DISCONTINUED | OUTPATIENT
Start: 2021-04-12 | End: 2021-04-12

## 2021-04-12 RX ORDER — ROCURONIUM BROMIDE 10 MG/ML
INJECTION, SOLUTION INTRAVENOUS AS NEEDED
Status: DISCONTINUED | OUTPATIENT
Start: 2021-04-12 | End: 2021-04-12

## 2021-04-12 RX ORDER — SUCCINYLCHOLINE/SOD CL,ISO/PF 100 MG/5ML
SYRINGE (ML) INTRAVENOUS AS NEEDED
Status: DISCONTINUED | OUTPATIENT
Start: 2021-04-12 | End: 2021-04-13

## 2021-04-12 RX ORDER — METOCLOPRAMIDE HYDROCHLORIDE 5 MG/ML
10 INJECTION INTRAMUSCULAR; INTRAVENOUS ONCE AS NEEDED
Status: DISCONTINUED | OUTPATIENT
Start: 2021-04-12 | End: 2021-04-12 | Stop reason: HOSPADM

## 2021-04-12 RX ORDER — HYDROMORPHONE HCL/PF 1 MG/ML
0.5 SYRINGE (ML) INJECTION EVERY 2 HOUR PRN
Status: DISCONTINUED | OUTPATIENT
Start: 2021-04-12 | End: 2021-04-28

## 2021-04-12 RX ORDER — HEPARIN SODIUM 200 [USP'U]/100ML
20 INJECTION, SOLUTION INTRAVENOUS CONTINUOUS
Status: DISCONTINUED | OUTPATIENT
Start: 2021-04-12 | End: 2021-04-15

## 2021-04-12 RX ORDER — HEPARIN SODIUM 1000 [USP'U]/ML
2000 INJECTION, SOLUTION INTRAVENOUS; SUBCUTANEOUS
Status: DISCONTINUED | OUTPATIENT
Start: 2021-04-12 | End: 2021-04-13

## 2021-04-12 RX ORDER — INDOCYANINE GREEN AND WATER 25 MG
KIT INJECTION AS NEEDED
Status: DISCONTINUED | OUTPATIENT
Start: 2021-04-12 | End: 2021-04-12

## 2021-04-12 RX ORDER — HYDROMORPHONE HCL/PF 1 MG/ML
0.5 SYRINGE (ML) INJECTION
Status: DISCONTINUED | OUTPATIENT
Start: 2021-04-12 | End: 2021-04-12 | Stop reason: HOSPADM

## 2021-04-12 RX ORDER — METOPROLOL TARTRATE 5 MG/5ML
2.5 INJECTION INTRAVENOUS
Status: DISCONTINUED | OUTPATIENT
Start: 2021-04-12 | End: 2021-04-12 | Stop reason: HOSPADM

## 2021-04-12 RX ORDER — DILTIAZEM HYDROCHLORIDE 5 MG/ML
15 INJECTION INTRAVENOUS ONCE
Status: DISCONTINUED | OUTPATIENT
Start: 2021-04-12 | End: 2021-04-12

## 2021-04-12 RX ORDER — ROCURONIUM BROMIDE 10 MG/ML
INJECTION, SOLUTION INTRAVENOUS AS NEEDED
Status: DISCONTINUED | OUTPATIENT
Start: 2021-04-12 | End: 2021-04-13

## 2021-04-12 RX ORDER — PROPOFOL 10 MG/ML
INJECTION, EMULSION INTRAVENOUS AS NEEDED
Status: DISCONTINUED | OUTPATIENT
Start: 2021-04-12 | End: 2021-04-13

## 2021-04-12 RX ORDER — ALBUMIN, HUMAN INJ 5% 5 %
SOLUTION INTRAVENOUS CONTINUOUS PRN
Status: DISCONTINUED | OUTPATIENT
Start: 2021-04-12 | End: 2021-04-12

## 2021-04-12 RX ORDER — ALBUTEROL SULFATE 2.5 MG/3ML
2.5 SOLUTION RESPIRATORY (INHALATION) ONCE AS NEEDED
Status: DISCONTINUED | OUTPATIENT
Start: 2021-04-12 | End: 2021-04-12 | Stop reason: HOSPADM

## 2021-04-12 RX ORDER — CEFAZOLIN SODIUM 1 G/3ML
INJECTION, POWDER, FOR SOLUTION INTRAMUSCULAR; INTRAVENOUS AS NEEDED
Status: DISCONTINUED | OUTPATIENT
Start: 2021-04-12 | End: 2021-04-13

## 2021-04-12 RX ORDER — PROPOFOL 10 MG/ML
INJECTION, EMULSION INTRAVENOUS AS NEEDED
Status: DISCONTINUED | OUTPATIENT
Start: 2021-04-12 | End: 2021-04-12

## 2021-04-12 RX ORDER — FENTANYL CITRATE 50 UG/ML
INJECTION, SOLUTION INTRAMUSCULAR; INTRAVENOUS AS NEEDED
Status: DISCONTINUED | OUTPATIENT
Start: 2021-04-12 | End: 2021-04-12

## 2021-04-12 RX ORDER — METOPROLOL TARTRATE 5 MG/5ML
INJECTION INTRAVENOUS AS NEEDED
Status: DISCONTINUED | OUTPATIENT
Start: 2021-04-12 | End: 2021-04-12

## 2021-04-12 RX ADMIN — ALBUMIN (HUMAN): 12.5 INJECTION, SOLUTION INTRAVENOUS at 09:26

## 2021-04-12 RX ADMIN — CEFAZOLIN 2000 MG: 1 INJECTION, POWDER, FOR SOLUTION INTRAMUSCULAR; INTRAVENOUS at 23:13

## 2021-04-12 RX ADMIN — INSULIN GLARGINE 10 UNITS: 100 INJECTION, SOLUTION SUBCUTANEOUS at 21:03

## 2021-04-12 RX ADMIN — DEXTROSE, SODIUM CHLORIDE, AND POTASSIUM CHLORIDE 125 ML/HR: 5; .45; .15 INJECTION INTRAVENOUS at 17:43

## 2021-04-12 RX ADMIN — SODIUM CHLORIDE, SODIUM LACTATE, POTASSIUM CHLORIDE, AND CALCIUM CHLORIDE: .6; .31; .03; .02 INJECTION, SOLUTION INTRAVENOUS at 08:03

## 2021-04-12 RX ADMIN — ROCURONIUM BROMIDE 20 MG: 50 INJECTION, SOLUTION INTRAVENOUS at 14:37

## 2021-04-12 RX ADMIN — SODIUM CHLORIDE: 0.9 INJECTION, SOLUTION INTRAVENOUS at 13:07

## 2021-04-12 RX ADMIN — PHENYLEPHRINE HYDROCHLORIDE 100 MCG: 10 INJECTION INTRAVENOUS at 09:11

## 2021-04-12 RX ADMIN — DILTIAZEM HYDROCHLORIDE 180 MG: 60 TABLET, FILM COATED ORAL at 18:29

## 2021-04-12 RX ADMIN — METOROPROLOL TARTRATE 2.5 MG: 5 INJECTION, SOLUTION INTRAVENOUS at 12:51

## 2021-04-12 RX ADMIN — CEFAZOLIN SODIUM 2000 MG: 2 SOLUTION INTRAVENOUS at 08:50

## 2021-04-12 RX ADMIN — DILTIAZEM HYDROCHLORIDE 5 MG/HR: 5 INJECTION INTRAVENOUS at 20:45

## 2021-04-12 RX ADMIN — EPHEDRINE SULFATE 10 MG: 50 INJECTION, SOLUTION INTRAVENOUS at 16:03

## 2021-04-12 RX ADMIN — FENTANYL CITRATE 50 MCG: 50 INJECTION INTRAMUSCULAR; INTRAVENOUS at 12:44

## 2021-04-12 RX ADMIN — FENTANYL CITRATE 50 MCG: 50 INJECTION INTRAMUSCULAR; INTRAVENOUS at 08:13

## 2021-04-12 RX ADMIN — SODIUM CHLORIDE, SODIUM LACTATE, POTASSIUM CHLORIDE, AND CALCIUM CHLORIDE: .6; .31; .03; .02 INJECTION, SOLUTION INTRAVENOUS at 09:33

## 2021-04-12 RX ADMIN — FENTANYL CITRATE 50 MCG: 50 INJECTION INTRAMUSCULAR; INTRAVENOUS at 17:01

## 2021-04-12 RX ADMIN — ESMOLOL HYDROCHLORIDE 10 MG: 100 INJECTION, SOLUTION INTRAVENOUS at 08:21

## 2021-04-12 RX ADMIN — ROCURONIUM BROMIDE 20 MG: 50 INJECTION, SOLUTION INTRAVENOUS at 11:28

## 2021-04-12 RX ADMIN — INSULIN HUMAN 2 UNITS: 100 INJECTION, SOLUTION PARENTERAL at 17:38

## 2021-04-12 RX ADMIN — ROCURONIUM BROMIDE 10 MG: 50 INJECTION, SOLUTION INTRAVENOUS at 13:30

## 2021-04-12 RX ADMIN — SODIUM CHLORIDE 0.2 MCG/KG/HR: 9 INJECTION, SOLUTION INTRAVENOUS at 10:11

## 2021-04-12 RX ADMIN — ROCURONIUM BROMIDE 10 MG: 50 INJECTION, SOLUTION INTRAVENOUS at 10:14

## 2021-04-12 RX ADMIN — METOPROLOL TARTRATE 50 MG: 50 TABLET, FILM COATED ORAL at 06:21

## 2021-04-12 RX ADMIN — Medication 100 MG: at 23:03

## 2021-04-12 RX ADMIN — PROPOFOL 50 MG: 10 INJECTION, EMULSION INTRAVENOUS at 08:16

## 2021-04-12 RX ADMIN — EPHEDRINE SULFATE 5 MG: 50 INJECTION, SOLUTION INTRAVENOUS at 09:28

## 2021-04-12 RX ADMIN — DEXMEDETOMIDINE HCL 12 MCG: 100 INJECTION INTRAVENOUS at 08:59

## 2021-04-12 RX ADMIN — CEFAZOLIN SODIUM 2000 MG: 2 SOLUTION INTRAVENOUS at 12:44

## 2021-04-12 RX ADMIN — DEXMEDETOMIDINE HCL 8 MCG: 100 INJECTION INTRAVENOUS at 09:40

## 2021-04-12 RX ADMIN — ASPIRIN 325 MG ORAL TABLET 325 MG: 325 PILL ORAL at 18:29

## 2021-04-12 RX ADMIN — NEOSTIGMINE METHYLSULFATE 5 MG: 1 INJECTION INTRAVENOUS at 16:09

## 2021-04-12 RX ADMIN — ALBUMIN (HUMAN): 12.5 INJECTION, SOLUTION INTRAVENOUS at 09:09

## 2021-04-12 RX ADMIN — DEXAMETHASONE SODIUM PHOSPHATE 10 MG: 10 INJECTION, SOLUTION INTRAMUSCULAR; INTRAVENOUS at 08:13

## 2021-04-12 RX ADMIN — CEFAZOLIN SODIUM 2000 MG: 2 SOLUTION INTRAVENOUS at 16:31

## 2021-04-12 RX ADMIN — KETAMINE HYDROCHLORIDE 50 MG: 50 INJECTION, SOLUTION INTRAMUSCULAR; INTRAVENOUS at 08:57

## 2021-04-12 RX ADMIN — ROCURONIUM BROMIDE 10 MG: 50 INJECTION, SOLUTION INTRAVENOUS at 12:42

## 2021-04-12 RX ADMIN — ONDANSETRON 4 MG: 2 INJECTION INTRAMUSCULAR; INTRAVENOUS at 15:47

## 2021-04-12 RX ADMIN — FENTANYL CITRATE 50 MCG: 50 INJECTION INTRAMUSCULAR; INTRAVENOUS at 23:59

## 2021-04-12 RX ADMIN — METOROPROLOL TARTRATE 2.5 MG: 5 INJECTION, SOLUTION INTRAVENOUS at 13:01

## 2021-04-12 RX ADMIN — FENTANYL CITRATE 50 MCG: 50 INJECTION INTRAMUSCULAR; INTRAVENOUS at 15:24

## 2021-04-12 RX ADMIN — PROPOFOL 150 MG: 10 INJECTION, EMULSION INTRAVENOUS at 23:03

## 2021-04-12 RX ADMIN — ROCURONIUM BROMIDE 50 MG: 50 INJECTION, SOLUTION INTRAVENOUS at 23:03

## 2021-04-12 RX ADMIN — ROCURONIUM BROMIDE 10 MG: 50 INJECTION, SOLUTION INTRAVENOUS at 12:15

## 2021-04-12 RX ADMIN — ROCURONIUM BROMIDE 30 MG: 50 INJECTION, SOLUTION INTRAVENOUS at 08:57

## 2021-04-12 RX ADMIN — ROCURONIUM BROMIDE 20 MG: 50 INJECTION, SOLUTION INTRAVENOUS at 10:49

## 2021-04-12 RX ADMIN — ROCURONIUM BROMIDE 10 MG: 50 INJECTION, SOLUTION INTRAVENOUS at 12:52

## 2021-04-12 RX ADMIN — ONDANSETRON 4 MG: 2 INJECTION INTRAMUSCULAR; INTRAVENOUS at 19:00

## 2021-04-12 RX ADMIN — LIDOCAINE HYDROCHLORIDE 50 MG: 10 INJECTION, SOLUTION EPIDURAL; INFILTRATION; INTRACAUDAL; PERINEURAL at 08:13

## 2021-04-12 RX ADMIN — FENTANYL CITRATE 50 MCG: 50 INJECTION INTRAMUSCULAR; INTRAVENOUS at 16:48

## 2021-04-12 RX ADMIN — DEXMEDETOMIDINE HCL 12 MCG: 100 INJECTION INTRAVENOUS at 10:07

## 2021-04-12 RX ADMIN — FENTANYL CITRATE 100 MCG: 50 INJECTION INTRAMUSCULAR; INTRAVENOUS at 23:13

## 2021-04-12 RX ADMIN — KETAMINE HYDROCHLORIDE 0.2 MG/KG/HR: 50 INJECTION, SOLUTION INTRAMUSCULAR; INTRAVENOUS at 08:36

## 2021-04-12 RX ADMIN — INSULIN HUMAN 1 UNITS: 100 INJECTION, SOLUTION PARENTERAL at 13:30

## 2021-04-12 RX ADMIN — ESMOLOL HYDROCHLORIDE 10 MG: 100 INJECTION, SOLUTION INTRAVENOUS at 12:42

## 2021-04-12 RX ADMIN — ALBUMIN (HUMAN): 12.5 INJECTION, SOLUTION INTRAVENOUS at 15:56

## 2021-04-12 RX ADMIN — ESMOLOL HYDROCHLORIDE 20 MG: 100 INJECTION, SOLUTION INTRAVENOUS at 16:28

## 2021-04-12 RX ADMIN — ROCURONIUM BROMIDE 20 MG: 50 INJECTION, SOLUTION INTRAVENOUS at 13:58

## 2021-04-12 RX ADMIN — DEXMEDETOMIDINE HCL 8 MCG: 100 INJECTION INTRAVENOUS at 09:51

## 2021-04-12 RX ADMIN — ROCURONIUM BROMIDE 50 MG: 50 INJECTION, SOLUTION INTRAVENOUS at 08:13

## 2021-04-12 RX ADMIN — ROCURONIUM BROMIDE 10 MG: 50 INJECTION, SOLUTION INTRAVENOUS at 09:49

## 2021-04-12 RX ADMIN — INDOCYANINE GREEN AND WATER 12.5 MG: KIT at 15:58

## 2021-04-12 RX ADMIN — SODIUM CHLORIDE: 0.9 INJECTION, SOLUTION INTRAVENOUS at 08:25

## 2021-04-12 RX ADMIN — INSULIN HUMAN 1 UNITS: 100 INJECTION, SOLUTION PARENTERAL at 14:55

## 2021-04-12 RX ADMIN — ESMOLOL HYDROCHLORIDE 10 MG: 100 INJECTION, SOLUTION INTRAVENOUS at 12:47

## 2021-04-12 RX ADMIN — ALBUMIN (HUMAN): 12.5 INJECTION, SOLUTION INTRAVENOUS at 12:15

## 2021-04-12 RX ADMIN — SODIUM CHLORIDE: 0.9 INJECTION, SOLUTION INTRAVENOUS at 22:53

## 2021-04-12 RX ADMIN — INSULIN HUMAN 10 UNITS: 100 INJECTION, SOLUTION PARENTERAL at 23:30

## 2021-04-12 RX ADMIN — DILTIAZEM HYDROCHLORIDE 15 MG: 5 INJECTION INTRAVENOUS at 20:32

## 2021-04-12 RX ADMIN — ESMOLOL HYDROCHLORIDE 10 MG: 100 INJECTION, SOLUTION INTRAVENOUS at 08:18

## 2021-04-12 RX ADMIN — PROPOFOL 150 MG: 10 INJECTION, EMULSION INTRAVENOUS at 08:13

## 2021-04-12 RX ADMIN — HEPARIN SODIUM 11.1 UNITS/KG/HR: 10000 INJECTION, SOLUTION INTRAVENOUS at 18:20

## 2021-04-12 RX ADMIN — GLYCOPYRROLATE 0.8 MG: 0.2 INJECTION, SOLUTION INTRAMUSCULAR; INTRAVENOUS at 16:09

## 2021-04-12 RX ADMIN — FENTANYL CITRATE 50 MCG: 50 INJECTION INTRAMUSCULAR; INTRAVENOUS at 08:18

## 2021-04-12 NOTE — PROGRESS NOTES
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, with fat layer exposed ([place] [address] )    S/P transmetatarsal amputation of foot, right ([place] [address] )      Plan:  - Appreciated Cardiology's assistance with management of patient  - Continue medical optimization   - OR today    Gary Mayo MD  Resident, Plastic & Reconstructive Surgery  [phone] (c)     Subjective/Objective   Chief Complaint: R foot wound    Subjective: resting in bed  Pain controlled  TATIANA overnight  Objective:     Blood pressure 130/90, pulse 76, temperature 97 9 °F (36 6 °C), temperature source Oral, resp  rate 16, height 6' 5" (1 956 m), weight 108 kg (237 lb 1 3 oz), SpO2 96 %  ,Body mass index is 28 11 kg/m²  Intake/Output Summary (Last 24 hours) at 4/12/2021 0747  Last data filed at 4/12/2021 0720  Gross per 24 hour   Intake 935 41 ml   Output 2700 ml   Net -1764 59 ml       Invasive Devices     Peripheral Intravenous Line            Peripheral IV 04/11/21 Distal;Right;Upper;Ventral (anterior) Arm less than 1 day          Drain            Negative Pressure Wound Therapy (V A C ) Foot 4 days                Physical Exam:   Gen: NAD  Neuro: Alert, oriented  CV: regular rate  Pulm: no respiratory distress  Abd: nondistended  GI: No [place]a catheter  Ext: RLE VAC to suction without leak     Lab, Imaging and other studies:I have personally reviewed pertinent lab results      VTE Pharmacologic Prophylaxis: Sequential compression device (Venodyne)   VTE Mechanical Prophylaxis: sequential compression device

## 2021-04-12 NOTE — ANESTHESIA PROCEDURE NOTES
Arterial Line Insertion  Performed by: Maciel Weiner CRNA  Authorized by: Wil Forbes MD   Consent: Verbal consent obtained  Written consent obtained  Risks and benefits: risks, benefits and alternatives were discussed  Consent given by: patient  Patient understanding: patient states understanding of the procedure being performed  Patient consent: the patient's understanding of the procedure matches consent given  Procedure consent: procedure consent matches procedure scheduled  Relevant documents: relevant documents present and verified  Required items: required blood products, implants, devices, and special equipment available  Patient identity confirmed: verbally with patient and arm band  Preparation: Patient was prepped and draped in the usual sterile fashion  Indications: hemodynamic monitoring  Orientation:  Left  Location: radial artery  Sedation:  Patient sedated: geta      Procedure Details:  Valeriy's test normal: yes  Needle gauge: 20  Number of attempts: 1    Post-procedure:  Post-procedure: dressing applied  Waveform: good waveform and waveform confirmed  Post-procedure CNS: normal and unchanged  Patient tolerance: patient tolerated the procedure well with no immediate complications

## 2021-04-12 NOTE — PROGRESS NOTES
Patient stable in [facility],  on and audible,  [employer] reading 38%, surgeon aware

## 2021-04-12 NOTE — ANESTHESIA POSTPROCEDURE EVALUATION
Post-Op Assessment Note    CV Status:  Stable  Pain Score: 0    Pain management: adequate     Mental Status:  Alert and awake   Hydration Status:  Euvolemic and stable   PONV Controlled:  Controlled   Airway Patency:  Patent      Post Op Vitals Reviewed: Yes      Staff: CRNA, Anesthesiologist         No complications documented      BP (P) 121/90 (04/12/21 1720)    Temp (P) 97 5 °F (36 4 °C) (04/12/21 1720)    Pulse (!) (P) 123 (04/12/21 1720)   Resp (!) (P) 10 (04/12/21 1720)    SpO2 99 % (04/12/21 1720)

## 2021-04-12 NOTE — OP NOTE
OPERATIVE REPORT  PATIENT NAME: Eryn Urbina  :  1950  MRN: [id number]  Pt Location: BE OR ROOM 07    SURGERY DATE: 2021    Surgeon(s) and Role:     * Julien Adam MD - Primary     * Ángel De La Rosa PA-C - Assisting    Preop Diagnosis:  Type 2 diabetes mellitus with diabetic neuropathy, without long-term current use of insulin ([facility] [address] ) [E11 40]  S/P transmetatarsal amputation of foot, right ([address] ) [Z89 431]    Post-Op Diagnosis Codes: * Type 2 diabetes mellitus with diabetic neuropathy, without long-term current use of insulin ([facility]) [E11 40]     * S/P transmetatarsal amputation of foot, right ([facility]) [Z89 431]    Procedure(s) (LRB):  SURGICAL DEBRIDEMENT OF LOWER EXTREMITY (Right) IN PREPARATION FOR CLOSURE  NEURECTOMY CUTANEOUS BRANCH SUPERFICIAL PERONEAL NERVE (RIGHT)   RECONSTRUCTION MICROSURGICAL W/ FREE ANTEROLATERAL THIGH FLAP (Right)  FLAP PERFUSION EVALUATION WITH ICG LYMPHANGIOGRAPHY     Specimen(s):  * No specimens in log *    Estimated Blood Loss:   150 mL    Drains:  Closed/Suction Drain Right Thigh Bulb 15 Fr  (Active)   Number of days: 0       Open Drain Right Foot (Active)   Number of days: 0       Negative Pressure Wound Therapy (V A C ) Foot (Active)   Unit Type VAC 21 0757   Black foam- # applied 4 21 1414   Cycle Continuous 21 0009   Target Pressure (mmHg) 125 21 0757   Canister Changed No 21 0757   Dressing Status Clean;Dry; Intact 21 0009   Output (mL) 0 mL 21 0507   Number of days: 5       Urethral Catheter Temperature probe 16 Fr   (Active)   Number of days: 0       Anesthesia Type:   General    Operative Indications:  Type 2 diabetes mellitus with diabetic neuropathy, without long-term current use of insulin ([facility]) [E11 40]  S/P transmetatarsal amputation of foot, right ([address] ) [Z89 431]      Operative Findings:  Right TMA stump with intact center flap but tissue loss medially and laterally with bone exposure   Right medial wound 9 x 4  X 1 cm with central bone exposure 2 x 2 cm  Right lateral wound 12 x 5 x 1 cm with central bone exposure 2 5 x 2 cm   Right anterolateral thigh flap harvest and anastomosed end to end to right anterior tibialis vessels and branch of greater saphenous vein  Spy used confirming adequate perfusion to the flap with no evidence of congestion  Complications:   None    Procedure and Technique:  Mr Adilene Babcock is a is a 72-year-old male with history of atrial fibrillation, diabetes and Charcot's foot who had a failed reconstruction of his right foot   Patient presented with surgical site infection and sepsis that require hospital readmission an emergent surgery on February 17 2021 with hematoma evacuation, fasciotomy for compartment syndrome as well as removal of the external fixator  Patient's symptoms progressed and unfortunately was not able to salvage his reconstruction and underwent eventual removal of the nail, with TMA amputation on 2/22/2021  Patient has been left with large secondary defect with exposure of bone for which I was asked to assist with evaluation and closure of this in order to salvage his recent TMA stump  During multiple preoperative encounters discussions in conjunction with the Podiatry team and patient reconstructive options were discussed in detail including our rationale to bring well-vascularized tissue form of free tissue transfer in order bone exposure, hardware exposure closure that would sustained the shearing forces from ambulation in future       All risks, benefits, and options, including but not limited to, pain, scarring, bleeding, infection, asymmetry, contour deformity, damage to adjacent nerves, vessels, and organs, hematoma, seroma, paresthesias, anesthesia (temporary versus permanent), skin necrosis, fat necrosis, flap necrosis, wound dehiscence with need for healing by secondary intention and postoperative dressing changes,  hypertrophic and/or keloid scar formation, deep venous thrombosis, pulmonary embolism, death, recurrence, and need for revision and/or reoperation were fully explained to the patient  The patient manifest reasonable understanding of all the above factors and has decided to proceed with limb salvage , hence and informed consent was then obtained  The patient was met in the preoperative holding area and all last minute questions were properly answered addressed  Site of surgery was marked before the into the operative theater and placed in supine position  After smooth anesthesia was then induced and all necessary hemodynamic lines were placed, bilateral lower extremity was then prepped and draped circumferentially in the usual sterile fashion  A surgical time-out was then performed in which all the necessary safety measures were confirmed  Procedure started by performing excisional debridement of the medial and lateral TMA stump using curette and a scalpel down to healthy bleeding fascia circumferentially and centrally down to the bone itself  Of notice there was hardware exposure along the medial distal wound aspect was also debrided as well  The wound was irrigated with 2 L of normal saline as well as Betadine solution  This point we proceeded with exposure over anticipated recipient vessels by using the Doppler device to shakira the trajectory of the dorsalis pedis and anterior tibialis pedicle along the skin making a 7-8 cm incision cephalad along this line and carried down with blunt and electrocautery dissection until the fascia of the extensor retinaculum was encountered and incised  The dorsalis vessels were encountered noted to be surrounded by significant scaring so more cephalad dissection was performed until adequate caliber sized for microvascular anastomosis was encountered of the anterior tibialis vessels    Of notice there was 1 artery with 2 vena comitantes and it was felt that the most lateral vena, tongue was not adequate for micro vascular anastomosis to the medial was prepared for this  In anticipation for further outflow needed a large branch of the greater saphenous vein was encountered along the dorsum of the foot with blunt dissection and careful dissection which was clipped distally and moved into the more favorable location and orientation for surgery  All the vessels were prepared for anastomosis by using hemoclips were encounter branches and dissecting a segment of adequate length and caliber  The vessels with faded on papaverine solution  The skin bridge intervening between the 2 medial and lateral wounds was incised and the skin flap was then raised in order to accommodate our anticipated flap location  A branche of the superficial perineal nerve which was direct contact the previous suture lines was then dissected free and neuro ties spine transected as far cephalad as possible and buried within the muscle in order to avoid neuroma formation  A standard anterolateral thigh flap was then shakira along the right ipsilateral thigh by using a template from the foot with skin paddle approximately 20 x 8 cm centered along our proposed location of the perforators by using the Doppler pencil devised  The medial incision was then made 1st with a scalpel and carried down with electrocautery through the subcutaneous tissue and to the fascia subfascial dissection was then performed from medial to lateral until the intermuscular septum between the rectus femoris and the vastus lateralis muscle was encounter  The lateral femoral descending branch was then encountered and this was dissected free up to the origin of the performed the with careful dissection using hemoclips to encounter perforators    A large B  was selected to perfuse our flap and had a short muscular oak cutaneous trajectory which was dissected free circumferentially carefully using bipolar, hemoclips were encounter branches until complete liberation and isolation from the pedicle  Our posterior incision was then made with a scalpel and carried down the same fashion with electrocautery and subfascial dissection performed from lateral to medial until our  was encountered  The flap was then completely isolated on our single B  and the pedicle was felt to be of adequate length and having a single artery and two vena comitans  The flap was then rendered ischemic and there was flushed with heparinized saline and was temporarily held and the foot with interrupted 2-0 Vicryl sutures as needed  Using Acland clamps dorsalis pedis and the medial vena comitans was clamped and transected and prepared for microvascular anastomosis  The dorsalis pedis vena comitant on was anastomosed end-to-end using a 2 5 mm flow   The 2nd flap vena comitans was anastomosed to the branch for the superficial system using a 2 0 flow   The artery was anastomosed end-to-end fashion using interrupted 8 0 nylon sutures  The clamps were then removed from the flap and it was noted to be adequately perfused with a good signal marked along the skin paddle using a 5 0 Prolene suture no evidence of congestion  The donor site was then closed by 1st performed hemostasis irrigation  A 15  MAGDALENE hubless drain was inserted secured with 3-0 nylon  Interrupted 2-0 PDS was used along the Natty's layer followed by Insorb stapler on the deep dermal layer and a running 3-0 strata fix suture  Dry dressing was then applied  Of notice trial of the midportion of the case the patient in atrial fibrillation worsen it with some evidence of intermittent hypotension and very low rate control with medications  During flap inset  signal was very low and the pedicle was explored noticing to have adequate venous outflow clinically and no evidence of congestion on the skin paddle    ICG angiography was then brought to the field and I flushed 5 mL of indocyanine green and flushed with 10 mL of normal saline and the spy machine was then brought to the field noticing immediate perfusion of the flap along the entire skin paddle and to the pedicle itself  Flap inset was then completed using interrupted 4-0 nylon sutures with complete coverage of our secondary defect  Non adhering dressing was then applied over the flap and soft dressings to the entire lower extremity at the conclusion of the case       I was present for all critical portions of the procedure    Patient Disposition:  PACU  and hemodynamically stable    SIGNATURE: Silver Cortes MD  DATE: April 12, 2021  TIME: 5:12 PM

## 2021-04-13 ENCOUNTER — ANESTHESIA EVENT (INPATIENT)
Dept: PERIOP | Facility: HOSPITAL | Age: 71
DRG: 463 | End: 2021-04-13
Payer: COMMERCIAL

## 2021-04-13 ENCOUNTER — ANESTHESIA (INPATIENT)
Dept: PERIOP | Facility: HOSPITAL | Age: 71
DRG: 463 | End: 2021-04-13
Payer: COMMERCIAL

## 2021-04-13 LAB
ANION GAP SERPL CALCULATED.3IONS-SCNC: 6 MMOL/L (ref 4–13)
ANION GAP SERPL CALCULATED.3IONS-SCNC: 7 MMOL/L (ref 4–13)
APTT PPP: 30 SECONDS (ref 23–37)
APTT PPP: 36 SECONDS (ref 23–37)
BASE EXCESS BLDA CALC-SCNC: -2 MMOL/L (ref -2–3)
BASE EXCESS BLDA CALC-SCNC: -3 MMOL/L (ref -2–3)
BUN SERPL-MCNC: 11 MG/DL (ref 5–25)
BUN SERPL-MCNC: 11 MG/DL (ref 5–25)
CA-I BLD-SCNC: 1.14 MMOL/L (ref 1.12–1.32)
CA-I BLD-SCNC: 1.18 MMOL/L (ref 1.12–1.32)
CALCIUM SERPL-MCNC: 8.1 MG/DL (ref 8.3–10.1)
CALCIUM SERPL-MCNC: 8.4 MG/DL (ref 8.3–10.1)
CHLORIDE SERPL-SCNC: 111 MMOL/L (ref 100–108)
CHLORIDE SERPL-SCNC: 116 MMOL/L (ref 100–108)
CO2 SERPL-SCNC: 23 MMOL/L (ref 21–32)
CO2 SERPL-SCNC: 24 MMOL/L (ref 21–32)
CREAT SERPL-MCNC: 0.84 MG/DL (ref 0.6–1.3)
CREAT SERPL-MCNC: 0.94 MG/DL (ref 0.6–1.3)
ERYTHROCYTE [DISTWIDTH] IN BLOOD BY AUTOMATED COUNT: 15.3 % (ref 11.6–15.1)
ERYTHROCYTE [DISTWIDTH] IN BLOOD BY AUTOMATED COUNT: 15.9 % (ref 11.6–15.1)
GFR SERPL CREATININE-BSD FRML MDRD: 82 ML/MIN/1.73SQ M
GFR SERPL CREATININE-BSD FRML MDRD: 89 ML/MIN/1.73SQ M
GLUCOSE SERPL-MCNC: 109 MG/DL (ref 65–140)
GLUCOSE SERPL-MCNC: 125 MG/DL (ref 65–140)
GLUCOSE SERPL-MCNC: 137 MG/DL (ref 65–140)
GLUCOSE SERPL-MCNC: 146 MG/DL (ref 65–140)
GLUCOSE SERPL-MCNC: 150 MG/DL (ref 65–140)
GLUCOSE SERPL-MCNC: 159 MG/DL (ref 65–140)
GLUCOSE SERPL-MCNC: 207 MG/DL (ref 65–140)
GLUCOSE SERPL-MCNC: 208 MG/DL (ref 65–140)
GLUCOSE SERPL-MCNC: 215 MG/DL (ref 65–140)
GLUCOSE SERPL-MCNC: 215 MG/DL (ref 65–140)
GLUCOSE SERPL-MCNC: 232 MG/DL (ref 65–140)
GLUCOSE SERPL-MCNC: 242 MG/DL (ref 65–140)
GLUCOSE SERPL-MCNC: 242 MG/DL (ref 65–140)
GLUCOSE SERPL-MCNC: 282 MG/DL (ref 65–140)
GLUCOSE SERPL-MCNC: 317 MG/DL (ref 65–140)
HCO3 BLDA-SCNC: 21.7 MMOL/L (ref 24–30)
HCO3 BLDA-SCNC: 21.9 MMOL/L (ref 24–30)
HCT VFR BLD AUTO: 29.6 % (ref 36.5–49.3)
HCT VFR BLD AUTO: 30.5 % (ref 36.5–49.3)
HCT VFR BLD CALC: 30 % (ref 36.5–49.3)
HCT VFR BLD CALC: 32 % (ref 36.5–49.3)
HGB BLD-MCNC: 9.2 G/DL (ref 12–17)
HGB BLD-MCNC: 9.7 G/DL (ref 12–17)
HGB BLDA-MCNC: 10.2 G/DL (ref 12–17)
HGB BLDA-MCNC: 10.9 G/DL (ref 12–17)
INR PPP: 1.32 (ref 0.84–1.19)
MAGNESIUM SERPL-MCNC: 1.9 MG/DL (ref 1.6–2.6)
MAGNESIUM SERPL-MCNC: 2.2 MG/DL (ref 1.6–2.6)
MCH RBC QN AUTO: 29.9 PG (ref 26.8–34.3)
MCH RBC QN AUTO: 30.2 PG (ref 26.8–34.3)
MCHC RBC AUTO-ENTMCNC: 31.1 G/DL (ref 31.4–37.4)
MCHC RBC AUTO-ENTMCNC: 31.8 G/DL (ref 31.4–37.4)
MCV RBC AUTO: 95 FL (ref 82–98)
MCV RBC AUTO: 96 FL (ref 82–98)
PCO2 BLD: 23 MMOL/L (ref 21–32)
PCO2 BLD: 23 MMOL/L (ref 21–32)
PCO2 BLD: 33.9 MM HG (ref 42–50)
PCO2 BLD: 37 MM HG (ref 42–50)
PH BLD: 7.38 [PH] (ref 7.3–7.4)
PH BLD: 7.42 [PH] (ref 7.3–7.4)
PHOSPHATE SERPL-MCNC: 1.9 MG/DL (ref 2.3–4.1)
PHOSPHATE SERPL-MCNC: 2.7 MG/DL (ref 2.3–4.1)
PLATELET # BLD AUTO: 218 THOUSANDS/UL (ref 149–390)
PLATELET # BLD AUTO: 221 THOUSANDS/UL (ref 149–390)
PMV BLD AUTO: 10.2 FL (ref 8.9–12.7)
PMV BLD AUTO: 10.4 FL (ref 8.9–12.7)
PO2 BLD: 211 MM HG (ref 35–45)
PO2 BLD: 304 MM HG (ref 35–45)
POTASSIUM BLD-SCNC: 4 MMOL/L (ref 3.5–5.3)
POTASSIUM BLD-SCNC: 4.3 MMOL/L (ref 3.5–5.3)
POTASSIUM SERPL-SCNC: 3.9 MMOL/L (ref 3.5–5.3)
POTASSIUM SERPL-SCNC: 4.2 MMOL/L (ref 3.5–5.3)
PROTHROMBIN TIME: 16.4 SECONDS (ref 11.6–14.5)
RBC # BLD AUTO: 3.08 MILLION/UL (ref 3.88–5.62)
RBC # BLD AUTO: 3.21 MILLION/UL (ref 3.88–5.62)
SAO2 % BLD FROM PO2: 100 % (ref 60–85)
SAO2 % BLD FROM PO2: 100 % (ref 60–85)
SODIUM BLD-SCNC: 141 MMOL/L (ref 136–145)
SODIUM BLD-SCNC: 142 MMOL/L (ref 136–145)
SODIUM SERPL-SCNC: 142 MMOL/L (ref 136–145)
SODIUM SERPL-SCNC: 145 MMOL/L (ref 136–145)
SPECIMEN SOURCE: ABNORMAL
SPECIMEN SOURCE: ABNORMAL
WBC # BLD AUTO: 11.37 THOUSAND/UL (ref 4.31–10.16)
WBC # BLD AUTO: 14.27 THOUSAND/UL (ref 4.31–10.16)

## 2021-04-13 PROCEDURE — 061T0ZY BYPASS RIGHT FOOT VEIN TO LOWER VEIN, OPEN APPROACH: ICD-10-PCS | Performed by: PLASTIC SURGERY

## 2021-04-13 PROCEDURE — 85610 PROTHROMBIN TIME: CPT | Performed by: SURGERY

## 2021-04-13 PROCEDURE — 84295 ASSAY OF SERUM SODIUM: CPT

## 2021-04-13 PROCEDURE — 84132 ASSAY OF SERUM POTASSIUM: CPT

## 2021-04-13 PROCEDURE — 84100 ASSAY OF PHOSPHORUS: CPT | Performed by: SURGERY

## 2021-04-13 PROCEDURE — 82330 ASSAY OF CALCIUM: CPT

## 2021-04-13 PROCEDURE — 85027 COMPLETE CBC AUTOMATED: CPT | Performed by: SURGERY

## 2021-04-13 PROCEDURE — NC001 PR NO CHARGE: Performed by: PLASTIC SURGERY

## 2021-04-13 PROCEDURE — 99024 POSTOP FOLLOW-UP VISIT: CPT | Performed by: PLASTIC SURGERY

## 2021-04-13 PROCEDURE — 80048 BASIC METABOLIC PNL TOTAL CA: CPT | Performed by: SURGERY

## 2021-04-13 PROCEDURE — 3E033GC INTRODUCTION OF OTHER THERAPEUTIC SUBSTANCE INTO PERIPHERAL VEIN, PERCUTANEOUS APPROACH: ICD-10-PCS | Performed by: PLASTIC SURGERY

## 2021-04-13 PROCEDURE — 35876 REMOVAL OF CLOT IN GRAFT: CPT | Performed by: PLASTIC SURGERY

## 2021-04-13 PROCEDURE — 82948 REAGENT STRIP/BLOOD GLUCOSE: CPT

## 2021-04-13 PROCEDURE — 83735 ASSAY OF MAGNESIUM: CPT | Performed by: SURGERY

## 2021-04-13 PROCEDURE — 85014 HEMATOCRIT: CPT

## 2021-04-13 PROCEDURE — 82947 ASSAY GLUCOSE BLOOD QUANT: CPT

## 2021-04-13 PROCEDURE — 85730 THROMBOPLASTIN TIME PARTIAL: CPT | Performed by: PLASTIC SURGERY

## 2021-04-13 PROCEDURE — 85730 THROMBOPLASTIN TIME PARTIAL: CPT | Performed by: SURGERY

## 2021-04-13 PROCEDURE — 82803 BLOOD GASES ANY COMBINATION: CPT

## 2021-04-13 PROCEDURE — 99223 1ST HOSP IP/OBS HIGH 75: CPT | Performed by: INTERNAL MEDICINE

## 2021-04-13 PROCEDURE — 061P0ZY BYPASS RIGHT SAPHENOUS VEIN TO LOWER VEIN, OPEN APPROACH: ICD-10-PCS | Performed by: PLASTIC SURGERY

## 2021-04-13 DEVICE — THE FLOWCOUPLER SYSTEM CONSISTS OF A FLOWCOUPLER DEVICE AND A FLOWCOUPLER MONITOR. THE FLOWCOUPLER DEVICE INCLUDES A 20MHZ ULTRASONIC DOPPLER TRANSDUCER (PROBE) ATTACHED TO ONE OF THE FLOWCOUPLER RINGS, AND AN EXTERNAL LEAD. THE FLOWCOUPLER RINGS ARE MADE OF HIGH DENSITY POLYETHYLENE AND SURGICAL GRADE STAINLESS STEEL PINS. A PROTECTIVE COVER AND JAW ASSEMBLY PROTECT THE RINGS AND PROBE WHICH ALLOW FOR EASY LOADING ONTO THE ANASTOMOTIC INSTRUMENT. BOTH THE PROTECTIVE COVER AND JAW ASSEMBLY ARE DISPOSABLE.
Type: IMPLANTABLE DEVICE | Site: FOOT | Status: FUNCTIONAL
Brand: GEM FLOW COUPLER

## 2021-04-13 RX ORDER — ONDANSETRON 2 MG/ML
4 INJECTION INTRAMUSCULAR; INTRAVENOUS EVERY 4 HOURS PRN
Status: DISCONTINUED | OUTPATIENT
Start: 2021-04-13 | End: 2021-04-13 | Stop reason: HOSPADM

## 2021-04-13 RX ORDER — ONDANSETRON 2 MG/ML
4 INJECTION INTRAMUSCULAR; INTRAVENOUS ONCE AS NEEDED
Status: DISCONTINUED | OUTPATIENT
Start: 2021-04-13 | End: 2021-04-13 | Stop reason: HOSPADM

## 2021-04-13 RX ORDER — CEFAZOLIN SODIUM 2 G/50ML
SOLUTION INTRAVENOUS AS NEEDED
Status: DISCONTINUED | OUTPATIENT
Start: 2021-04-13 | End: 2021-04-13

## 2021-04-13 RX ORDER — GINSENG 100 MG
CAPSULE ORAL AS NEEDED
Status: DISCONTINUED | OUTPATIENT
Start: 2021-04-13 | End: 2021-04-13 | Stop reason: HOSPADM

## 2021-04-13 RX ORDER — ALBUMIN, HUMAN INJ 5% 5 %
SOLUTION INTRAVENOUS CONTINUOUS PRN
Status: DISCONTINUED | OUTPATIENT
Start: 2021-04-13 | End: 2021-04-13

## 2021-04-13 RX ORDER — SODIUM CHLORIDE 9 MG/ML
INJECTION, SOLUTION INTRAVENOUS CONTINUOUS PRN
Status: DISCONTINUED | OUTPATIENT
Start: 2021-04-13 | End: 2021-04-13

## 2021-04-13 RX ORDER — ESMOLOL HYDROCHLORIDE 10 MG/ML
INJECTION INTRAVENOUS AS NEEDED
Status: DISCONTINUED | OUTPATIENT
Start: 2021-04-13 | End: 2021-04-13

## 2021-04-13 RX ORDER — FENTANYL CITRATE/PF 50 MCG/ML
25 SYRINGE (ML) INJECTION
Status: DISCONTINUED | OUTPATIENT
Start: 2021-04-13 | End: 2021-04-13 | Stop reason: HOSPADM

## 2021-04-13 RX ORDER — ONDANSETRON 2 MG/ML
INJECTION INTRAMUSCULAR; INTRAVENOUS AS NEEDED
Status: DISCONTINUED | OUTPATIENT
Start: 2021-04-13 | End: 2021-04-13

## 2021-04-13 RX ORDER — HYDROMORPHONE HCL/PF 1 MG/ML
SYRINGE (ML) INJECTION AS NEEDED
Status: DISCONTINUED | OUTPATIENT
Start: 2021-04-13 | End: 2021-04-13

## 2021-04-13 RX ORDER — KETAMINE HCL IN NACL, ISO-OSM 100MG/10ML
SYRINGE (ML) INJECTION AS NEEDED
Status: DISCONTINUED | OUTPATIENT
Start: 2021-04-13 | End: 2021-04-13

## 2021-04-13 RX ORDER — HYDROMORPHONE HCL/PF 1 MG/ML
0.5 SYRINGE (ML) INJECTION
Status: DISCONTINUED | OUTPATIENT
Start: 2021-04-13 | End: 2021-04-13 | Stop reason: HOSPADM

## 2021-04-13 RX ORDER — NEOSTIGMINE METHYLSULFATE 1 MG/ML
INJECTION INTRAVENOUS AS NEEDED
Status: DISCONTINUED | OUTPATIENT
Start: 2021-04-13 | End: 2021-04-13

## 2021-04-13 RX ORDER — MINERAL OIL
OIL (ML) MISCELLANEOUS AS NEEDED
Status: DISCONTINUED | OUTPATIENT
Start: 2021-04-13 | End: 2021-04-13 | Stop reason: HOSPADM

## 2021-04-13 RX ORDER — PROPOFOL 10 MG/ML
INJECTION, EMULSION INTRAVENOUS AS NEEDED
Status: DISCONTINUED | OUTPATIENT
Start: 2021-04-13 | End: 2021-04-13

## 2021-04-13 RX ORDER — CEFAZOLIN SODIUM 2 G/50ML
2000 SOLUTION INTRAVENOUS ONCE
Status: DISCONTINUED | OUTPATIENT
Start: 2021-04-13 | End: 2021-04-15

## 2021-04-13 RX ORDER — ROCURONIUM BROMIDE 10 MG/ML
INJECTION, SOLUTION INTRAVENOUS AS NEEDED
Status: DISCONTINUED | OUTPATIENT
Start: 2021-04-13 | End: 2021-04-13

## 2021-04-13 RX ORDER — SODIUM CHLORIDE 9 MG/ML
50 INJECTION, SOLUTION INTRAVENOUS CONTINUOUS
Status: DISCONTINUED | OUTPATIENT
Start: 2021-04-13 | End: 2021-04-13

## 2021-04-13 RX ORDER — HEPARIN SODIUM 1000 [USP'U]/ML
2000 INJECTION, SOLUTION INTRAVENOUS; SUBCUTANEOUS
Status: DISCONTINUED | OUTPATIENT
Start: 2021-04-13 | End: 2021-04-15

## 2021-04-13 RX ORDER — LIDOCAINE HYDROCHLORIDE 10 MG/ML
INJECTION, SOLUTION EPIDURAL; INFILTRATION; INTRACAUDAL; PERINEURAL AS NEEDED
Status: DISCONTINUED | OUTPATIENT
Start: 2021-04-13 | End: 2021-04-13

## 2021-04-13 RX ORDER — HEPARIN SODIUM 1000 [USP'U]/ML
4000 INJECTION, SOLUTION INTRAVENOUS; SUBCUTANEOUS
Status: DISCONTINUED | OUTPATIENT
Start: 2021-04-13 | End: 2021-04-15

## 2021-04-13 RX ORDER — DEXAMETHASONE SODIUM PHOSPHATE 10 MG/ML
INJECTION, SOLUTION INTRAMUSCULAR; INTRAVENOUS AS NEEDED
Status: DISCONTINUED | OUTPATIENT
Start: 2021-04-13 | End: 2021-04-13

## 2021-04-13 RX ORDER — METOPROLOL TARTRATE 5 MG/5ML
INJECTION INTRAVENOUS AS NEEDED
Status: DISCONTINUED | OUTPATIENT
Start: 2021-04-13 | End: 2021-04-13

## 2021-04-13 RX ORDER — LIDOCAINE HYDROCHLORIDE AND EPINEPHRINE 10; 10 MG/ML; UG/ML
INJECTION, SOLUTION INFILTRATION; PERINEURAL AS NEEDED
Status: DISCONTINUED | OUTPATIENT
Start: 2021-04-13 | End: 2021-04-13 | Stop reason: HOSPADM

## 2021-04-13 RX ORDER — HEPARIN SODIUM 1000 [USP'U]/ML
INJECTION, SOLUTION INTRAVENOUS; SUBCUTANEOUS AS NEEDED
Status: DISCONTINUED | OUTPATIENT
Start: 2021-04-13 | End: 2021-04-13

## 2021-04-13 RX ORDER — GLYCOPYRROLATE 0.2 MG/ML
INJECTION INTRAMUSCULAR; INTRAVENOUS AS NEEDED
Status: DISCONTINUED | OUTPATIENT
Start: 2021-04-13 | End: 2021-04-13

## 2021-04-13 RX ORDER — KETAMINE HYDROCHLORIDE 50 MG/ML
INJECTION, SOLUTION, CONCENTRATE INTRAMUSCULAR; INTRAVENOUS AS NEEDED
Status: DISCONTINUED | OUTPATIENT
Start: 2021-04-13 | End: 2021-04-13

## 2021-04-13 RX ORDER — DILTIAZEM HYDROCHLORIDE 60 MG/1
180 TABLET, FILM COATED ORAL 2 TIMES DAILY
Status: DISCONTINUED | OUTPATIENT
Start: 2021-04-13 | End: 2021-04-30 | Stop reason: HOSPADM

## 2021-04-13 RX ORDER — SODIUM CHLORIDE 9 MG/ML
INJECTION, SOLUTION INTRAVENOUS CONTINUOUS PRN
Status: DISCONTINUED | OUTPATIENT
Start: 2021-04-12 | End: 2021-04-13

## 2021-04-13 RX ORDER — HEPARIN SODIUM 10000 [USP'U]/100ML
3-20 INJECTION, SOLUTION INTRAVENOUS
Status: DISCONTINUED | OUTPATIENT
Start: 2021-04-13 | End: 2021-04-15

## 2021-04-13 RX ADMIN — HEPARIN SODIUM 4000 UNITS: 1000 INJECTION INTRAVENOUS; SUBCUTANEOUS at 21:30

## 2021-04-13 RX ADMIN — DOXYCYCLINE 100 MG: 100 CAPSULE ORAL at 20:38

## 2021-04-13 RX ADMIN — ESMOLOL HYDROCHLORIDE 40 MG: 100 INJECTION, SOLUTION INTRAVENOUS at 08:13

## 2021-04-13 RX ADMIN — ESMOLOL HYDROCHLORIDE 40 MG: 100 INJECTION, SOLUTION INTRAVENOUS at 13:13

## 2021-04-13 RX ADMIN — HYDROMORPHONE HYDROCHLORIDE 0.5 MG: 1 INJECTION, SOLUTION INTRAMUSCULAR; INTRAVENOUS; SUBCUTANEOUS at 08:18

## 2021-04-13 RX ADMIN — SODIUM CHLORIDE 50 ML/HR: 0.9 INJECTION, SOLUTION INTRAVENOUS at 04:53

## 2021-04-13 RX ADMIN — ONDANSETRON 4 MG: 2 INJECTION INTRAMUSCULAR; INTRAVENOUS at 03:16

## 2021-04-13 RX ADMIN — ONDANSETRON 4 MG: 2 INJECTION INTRAMUSCULAR; INTRAVENOUS at 16:06

## 2021-04-13 RX ADMIN — ROCURONIUM BROMIDE 50 MG: 50 INJECTION, SOLUTION INTRAVENOUS at 08:13

## 2021-04-13 RX ADMIN — HEPARIN SODIUM 11.1 UNITS/KG/HR: 10000 INJECTION, SOLUTION INTRAVENOUS at 14:30

## 2021-04-13 RX ADMIN — DILTIAZEM HYDROCHLORIDE 180 MG: 60 TABLET, FILM COATED ORAL at 07:36

## 2021-04-13 RX ADMIN — SODIUM CHLORIDE: 0.9 INJECTION, SOLUTION INTRAVENOUS at 07:50

## 2021-04-13 RX ADMIN — PROPOFOL 160 MG: 10 INJECTION, EMULSION INTRAVENOUS at 08:13

## 2021-04-13 RX ADMIN — ROCURONIUM BROMIDE 30 MG: 50 INJECTION, SOLUTION INTRAVENOUS at 09:13

## 2021-04-13 RX ADMIN — ONDANSETRON 4 MG: 2 INJECTION INTRAMUSCULAR; INTRAVENOUS at 00:47

## 2021-04-13 RX ADMIN — LIDOCAINE HYDROCHLORIDE 50 MG: 10 INJECTION, SOLUTION EPIDURAL; INFILTRATION; INTRACAUDAL; PERINEURAL at 08:13

## 2021-04-13 RX ADMIN — ALBUMIN (HUMAN): 12.5 INJECTION, SOLUTION INTRAVENOUS at 00:00

## 2021-04-13 RX ADMIN — ROCURONIUM BROMIDE 20 MG: 50 INJECTION, SOLUTION INTRAVENOUS at 11:42

## 2021-04-13 RX ADMIN — PHENYLEPHRINE HYDROCHLORIDE 50 MCG/MIN: 10 INJECTION INTRAVENOUS at 09:04

## 2021-04-13 RX ADMIN — METOROPROLOL TARTRATE 2.5 MG: 5 INJECTION, SOLUTION INTRAVENOUS at 13:24

## 2021-04-13 RX ADMIN — CEFAZOLIN SODIUM 2000 MG: 2 SOLUTION INTRAVENOUS at 08:16

## 2021-04-13 RX ADMIN — DEXTROSE, SODIUM CHLORIDE, AND POTASSIUM CHLORIDE 125 ML/HR: 5; .45; .15 INJECTION INTRAVENOUS at 22:31

## 2021-04-13 RX ADMIN — Medication 30 MG: at 08:04

## 2021-04-13 RX ADMIN — SODIUM CHLORIDE 8 UNITS/HR: 9 INJECTION, SOLUTION INTRAVENOUS at 02:15

## 2021-04-13 RX ADMIN — DEXAMETHASONE SODIUM PHOSPHATE 10 MG: 10 INJECTION, SOLUTION INTRAMUSCULAR; INTRAVENOUS at 12:57

## 2021-04-13 RX ADMIN — DILTIAZEM HYDROCHLORIDE 180 MG: 60 TABLET, FILM COATED ORAL at 17:35

## 2021-04-13 RX ADMIN — DOCUSATE SODIUM 100 MG: 100 CAPSULE, LIQUID FILLED ORAL at 17:35

## 2021-04-13 RX ADMIN — Medication 20 MG: at 08:55

## 2021-04-13 RX ADMIN — HEPARIN SODIUM 3000 UNITS: 1000 INJECTION INTRAVENOUS; SUBCUTANEOUS at 11:53

## 2021-04-13 RX ADMIN — SUGAMMADEX 200 MG: 100 INJECTION, SOLUTION INTRAVENOUS at 00:55

## 2021-04-13 RX ADMIN — METOPROLOL TARTRATE 50 MG: 50 TABLET, FILM COATED ORAL at 20:39

## 2021-04-13 RX ADMIN — ONDANSETRON 4 MG: 2 INJECTION INTRAMUSCULAR; INTRAVENOUS at 13:04

## 2021-04-13 RX ADMIN — CEFAZOLIN SODIUM 2000 MG: 2 SOLUTION INTRAVENOUS at 12:12

## 2021-04-13 RX ADMIN — ASPIRIN 325 MG ORAL TABLET 325 MG: 325 PILL ORAL at 07:37

## 2021-04-13 RX ADMIN — SODIUM CHLORIDE 5 UNITS/HR: 9 INJECTION, SOLUTION INTRAVENOUS at 00:48

## 2021-04-13 RX ADMIN — SODIUM CHLORIDE: 0.9 INJECTION, SOLUTION INTRAVENOUS at 12:23

## 2021-04-13 RX ADMIN — INSULIN HUMAN 5 UNITS: 100 INJECTION, SOLUTION PARENTERAL at 00:35

## 2021-04-13 RX ADMIN — GLYCOPYRROLATE 0.6 MG: 0.2 INJECTION, SOLUTION INTRAMUSCULAR; INTRAVENOUS at 13:07

## 2021-04-13 RX ADMIN — NEOSTIGMINE METHYLSULFATE 4 MG: 1 INJECTION INTRAVENOUS at 13:07

## 2021-04-13 RX ADMIN — DOCUSATE SODIUM AND SENNOSIDES 1 TABLET: 8.6; 5 TABLET ORAL at 22:25

## 2021-04-13 RX ADMIN — FENTANYL CITRATE 50 MCG: 50 INJECTION INTRAMUSCULAR; INTRAVENOUS at 00:32

## 2021-04-13 RX ADMIN — ATORVASTATIN CALCIUM 40 MG: 40 TABLET, FILM COATED ORAL at 17:35

## 2021-04-13 RX ADMIN — ALBUMIN (HUMAN): 12.5 INJECTION, SOLUTION INTRAVENOUS at 08:59

## 2021-04-13 NOTE — ANESTHESIA POSTPROCEDURE EVALUATION
Post-Op Assessment Note    CV Status:  Stable    Pain management: adequate     Mental Status:  Lethargic and sleepy   Hydration Status:  Stable   PONV Controlled:  None   Airway Patency:  Patent      Post Op Vitals Reviewed: Yes      Staff: CRNA   Comments: hr 89, bp 109/63, O2 sat 99%, rr 10, blood sugar 242, remains on insulin gtt, temp 46 5        No complications documented      BP      Temp      Pulse    Resp      SpO2

## 2021-04-13 NOTE — PROGRESS NOTES
Dr Jenna Lane notified via tiger text that the doppler signal not sounding the best  Will be down to see patient

## 2021-04-13 NOTE — NURSING NOTE
Reached out to plastics relating to placing pt on NS 0 9% on top of D5 NS 0 45% with KCL 20  Did not receive response  Hence reached out to SOD-B on call Luis Eduardo Arriaza 8 relating to fluids  He said okay to hold D5 NS 0 45% with KCL 20 and place on NS 0 9%, until plastics responds  No new orders at this time

## 2021-04-13 NOTE — PROGRESS NOTES
Progress Note - Plastic Surgery   Lauren Gonzalez  73 y o  male MRN: [id number]  Unit/Bed#: [facility] 525-01 Encounter: [id number]    Assessment:  Principal Problem:    Type 2 diabetes mellitus with diabetic neuropathy ([facility] [address] )  Active Problems:    Atrial fibrillation with RVR ([facility] [address] )    Hyperlipidemia    Essential hypertension    Acquired absence of other right toe(s) ([facility] [address] )    Deep disruption or dehiscence of operation wound    Ambulatory dysfunction    Diabetic ulcer of right 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, with fat layer exposed ([facility] )    S/P transmetatarsal amputation of foot, right ([facility] [address] )    73 y o  male POD 1 from ALT reconstruction of R foot defect, subsequent takeback on POD 0 for venous congestion  RLE free flap tissue oxygen perfusion dropped this morning at approx 0600  Bedside eval reveals developing venous congestion    Plan:  - Return to OR this AM for exploration  - Discussed possible need for vein grafts, second free flap  - Wife updated by Dr Reed Brandon MD  Resident, Plastic & Reconstructive Surgery  [phone] (c)     Subjective/Objective   Chief Complaint: R foot wound    Subjective: resting in bed  Pain controlled  TATIANA overnight  Objective:     Blood pressure 123/50, pulse 90, temperature 98 °F (36 7 °C), temperature source Oral, resp  rate 12, height 6' 5" (1 956 m), weight 108 kg (237 lb 1 3 oz), SpO2 97 %  ,Body mass index is 28 11 kg/m²        Intake/Output Summary (Last 24 hours) at 4/13/2021 0728  Last data filed at 4/13/2021 0461  Gross per 24 hour   Intake 5245 ml   Output 3660 ml   Net 1585 ml       Invasive Devices     Peripheral Intravenous Line            Peripheral IV 04/11/21 Distal;Upper;Left Arm 1 day    Peripheral IV 04/12/21 Right Forearm less than 1 day          Drain            Closed/Suction Drain Right Thigh Bulb 15 Fr  less than 1 day    Urethral Catheter Temperature probe 16 Fr  less than 1 day                Physical Exam:   Gen: NAD  Neuro: Alert, oriented  CV: regular rate  Pulm: no respiratory distress  Abd: nondistended  GI: Astorga catheter  Ext: RLE free flap with increased edema, brisk cap refill  Tissue oxygen monitor 11%  All dressings removed at bedside  No change in exam     Lab, Imaging and other studies:I have personally reviewed pertinent lab results      VTE Pharmacologic Prophylaxis: Sequential compression device (Venodyne), heparin drip  VTE Mechanical Prophylaxis: sequential compression device

## 2021-04-13 NOTE — QUICK NOTE
Chart Update  Overnight nurse successfully able to wean down off of diltiazem gtt around 3-4am  HR relatively well controlled  Changed order and communicated the plan to give his morning dose of diltiazem early   Will communicate above with day team     Sonu John MD  - PGY-5 Cardiology Fellow  - Tiger text enabled, Cardiology Team 2

## 2021-04-13 NOTE — PROGRESS NOTES
Plastic Surgery Attending     Pt seen and examined  Called to evaluate Mr Gagan Farooq  Vioptix signal has abruptly gone down  /50 (BP Location: Left arm)   Pulse 90   Temp 98 °F (36 7 °C) (Oral)   Resp 12   Ht 6' 5" (1 956 m)   Wt 108 kg (237 lb 1 3 oz)   SpO2 97%   BMI 28 11 kg/m²     RLE: flap with mild congestion, good arterial doppler signal  Vioptix signal decreased to low levels  Dressing were removed and no change in clinical exam      A: Flap congestion, A  Fib   I discussed with Mr Ashley Loomis that unfortunately pt is showing early signs of congestion at it is my medical recommendation to undergo surgical re-exploration  Risk and benefits were discussed  I have also discussed with him that chances of success at flap survival significantly decrease due to multiple procedures  I have discussed with his wife as well about recent events       [address] Reconstructive Surgery   [address]   [facility] [place] [address] [facility]   Office: [phone]

## 2021-04-13 NOTE — PROGRESS NOTES
INTERNAL MEDICINE RESIDENCY PROGRESS NOTE     Name: Ngoc Martinez  Age & Sex: 73 y o  male   MRN: [id number]  Unit/Bed#: OR POOL   Encounter: [id number]  Team: SOD Team B     PATIENT INFORMATION     Name: Ngoc Martinez  Age & Sex: 73 y o  male   MRN: [id number]  Hospital Stay Days: 6    ASSESSMENT/PLAN     Principal Problem:    Type 2 diabetes mellitus with diabetic neuropathy ([place] [address] )  Active Problems:    Hyperlipidemia    Atrial fibrillation with RVR ([place] [address] )    Essential hypertension    Acquired absence of other right toe(s) ([facility])    Deep disruption or dehiscence of operation wound    Ambulatory dysfunction    Diabetic ulcer of right 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, with fat layer exposed ([place] [address] )    S/P transmetatarsal amputation of foot, right ([place] [address] )      Diabetic ulcer of right 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, with fat layer exposed [facility])  Assessment & Plan  Lab Results   Component Value Date    HGBA1C 7 9 (H) 03/17/2021       Recent Labs     04/13/21  0211 04/13/21  0251 04/13/21  0538 04/13/21  0701   POCGLU 232* 207* 150* 109       Blood Sugar Average: Last 72 hrs:  (P) 184 [id number]     Plan    - Pt currently on insulin gtt  Will continue to monitor blood sugars      Essential hypertension  Assessment & Plan  Plan  -continue home lisinopril 40 mg daily  -continue diltiazem 180 mg b i d  Atrial fibrillation with RVR [facility])  Assessment & Plan  patient was scheduled to undergo elective right foot debridement and free flap reconstruction however this procedure was aborted due to AFib with RVR and hypertension during induction  Plan  -cardiology is following  -Pt on diltiazem 180 mg b i d , was not able to tolerate PO meds and was on Afib w RVR 4/12/21  Started on cardizem gtt by cards   -metoprolo 50 mg q12  -home Eliquis 5 mg b i d   Currently on hold  -continue telemetry monitoring    Hyperlipidemia  Assessment & Plan  Plan   -continue home Lipitor 40 mg daily    * Type 2 diabetes mellitus with diabetic neuropathy [facility])  Assessment & Plan  Lab Results   Component Value Date    HGBA1C 7 9 (H) 2021       Recent Labs     21  0211 21  0251 21  0538 21  0701   POCGLU 232* 207* 150* 109       Blood Sugar Average: Last 72 hrs:  (P) 139 [id number]     Plan  -patient not on insulin, was previously on home Metformin, glimepiride 4 mg b i d  and home sitagliptin 100 mg daily  Now on hold  - Pt  on insulin gtt  -continue to monitor blood glucose       Disposition: will continue to follow patient peripherally  SUBJECTIVE     Patient gone to the OR for reconstruction microsurgical w/free flap on the right foot  No acute events overnight  OBJECTIVE     Vitals:    21 0443 21 0543 21 0550 21 0623   BP: (!) 108/45 123/50     BP Location: Left arm Left arm     Pulse: 78 90     Resp:       Temp:   98 °F (36 7 °C)    TempSrc:   Oral    SpO2:    97%   Weight:       Height:          Temperature:   Temp (24hrs), Av °F (36 7 °C), Min:97 5 °F (36 4 °C), Max:99 1 °F (37 3 °C)    Temperature: 98 °F (36 7 °C)  Intake & Output:  I/O        07 -  0700  07 -  0700  07 -  0700    P  O   0     I V  (mL/kg) 714 6 (6 6) 4465 8 (41 4)     IV Piggyback  1000 250    Total Intake(mL/kg) 714 6 (6 6) 5465 8 (50 6) 250 (2 3)    Urine (mL/kg/hr) 2600 (1) 3500 (1 4)     Drains 100 10     Blood  150     Total Output 2700 3660     Net - 4 +1805 8 +250               Weights:   IBW (Ideal Body Weight): 89 1 kg    Body mass index is 28 11 kg/m²  Weight (last 2 days)     None        Physical Exam Pt  Not  Present in the room  Left for the OR ealry this morning  LABORATORY DATA     Labs: I have personally reviewed pertinent reports    Results from last 7 days   Lab Units 21  0555 21  0033 21  2324 21  1947  21  0008   WBC Thousand/uL 11 37*  --   --  10 62*  --  7 96   HEMOGLOBIN g/dL 9 7*  -- --  11 6*  --  11 5*   I STAT HEMOGLOBIN g/dl  --  10 2* 10 9*  --    < >  --    HEMATOCRIT % 30 5*  --   --  37 2  --  36 6   HEMATOCRIT, ISTAT %  --  30* 32*  --    < >  --    PLATELETS Thousands/uL 221  --   --  248  --  256    < > = values in this interval not displayed  Results from last 7 days   Lab Units 04/13/21  0555 04/13/21  0033 04/12/21  2324 04/12/21 1947 04/12/21  1449  04/12/21  0008   POTASSIUM mmol/L 3 9  --   --  4 5  --   --  3 9   CHLORIDE mmol/L 111*  --   --  113*  --   --  110*   CO2 mmol/L 24  --   --  22  --   --  24   CO2, I-STAT mmol/L  --  23 23  --  24   < >  --    BUN mg/dL 11  --   --  11  --   --  16   CREATININE mg/dL 0 84  --   --  1 04  --   --  0 98   CALCIUM mg/dL 8 4  --   --  8 3  --   --  9 0   ALK PHOS U/L  --   --   --   --   --   --  111   ALT U/L  --   --   --   --   --   --  59   AST U/L  --   --   --   --   --   --  21   GLUCOSE, ISTAT mg/dl  --  282* 317*  --  184*   < >  --     < > = values in this interval not displayed  Results from last 7 days   Lab Units 04/13/21 0555 04/12/21 1947 04/12/21  0008   MAGNESIUM mg/dL 1 9 2 0 2 1     Results from last 7 days   Lab Units 04/13/21 0555 04/12/21 1947 04/12/21  0008   PHOSPHORUS mg/dL 1 9* 2 5 3 5      Results from last 7 days   Lab Units 04/12/21 1947   INR  1 25*   PTT seconds 34               IMAGING & DIAGNOSTIC TESTING     Radiology Results: I have personally reviewed pertinent reports  No results found  Other Diagnostic Testing: I have personally reviewed pertinent reports      ACTIVE MEDICATIONS     Current Facility-Administered Medications   Medication Dose Route Frequency    acetaminophen (TYLENOL) tablet 650 mg  650 mg Oral Q4H PRN    aspirin tablet 325 mg  325 mg Oral Daily    atorvastatin (LIPITOR) tablet 40 mg  40 mg Oral Daily With Dinner    ceFAZolin (ANCEF) IVPB (premix in dextrose) 2,000 mg 50 mL  2,000 mg Intravenous Once    dextrose 5 % and sodium chloride 0 45 % with KCl 20 mEq/L infusion  125 mL/hr Intravenous Continuous    [MAR Hold] diltiazem (CARDIZEM) tablet 180 mg  180 mg Oral BID    docusate sodium (COLACE) capsule 100 mg  100 mg Oral BID    doxycycline hyclate (VIBRAMYCIN) capsule 100 mg  100 mg Oral Q12H [facility]    heparin (porcine) 25,000 units in 0 45% NaCl 250 mL infusion (premix)  3-20 Units/kg/hr (Order-Specific) Intravenous Titrated    heparin (porcine) 50,000 Units in lactated Ringer's 500 mL OR irrigation   Irrigation Once    heparin 1000 units in 500 mL infusion (premix) for sheath patency  20 Units/hr Intravenous Continuous    HYDROmorphone (DILAUDID) injection 0 5 mg  0 5 mg Intravenous Q2H PRN    insulin glargine (LANTUS) subcutaneous injection 10 Units 0 1 mL  10 Units Subcutaneous HS    insulin regular (HumuLIN R,NovoLIN R) 1 Units/mL in sodium chloride 0 9 % 100 mL infusion  0 3-21 Units/hr Intravenous Titrated    lidocaine-epinephrine (XYLOCAINE/EPINEPHRINE) 1 %-1:100,000 injection    PRN    lisinopril (ZESTRIL) tablet 40 mg  40 mg Oral Daily    metoprolol tartrate (LOPRESSOR) tablet 50 mg  50 mg Oral Q12H [facility]    mineral oil light (MURI-LUBE) oil    PRN    ondansetron (ZOFRAN) injection 4 mg  4 mg Intravenous Q6H PRN    oxyCODONE (ROXICODONE) IR tablet 10 mg  10 mg Oral Q4H PRN    oxyCODONE (ROXICODONE) IR tablet 5 mg  5 mg Oral Q4H PRN    papaverine 60 mg in sodium chloride 0 9 % 30 mL OR irrigation   Irrigation Once    senna-docusate sodium (SENOKOT S) 8 6-50 mg per tablet 1 tablet  1 tablet Oral HS     Facility-Administered Medications Ordered in Other Encounters   Medication Dose Route Frequency    albumin human (FLEXBUMIN) 5 % injection    Continuous PRN    ceFAZolin (ANCEF) IVPB (premix in dextrose)    PRN    esmolol (BREVIBLOC) IV bolus    PRN    HYDROmorphone (DILAUDID) injection    PRN    Ketamine HCl   Intravenous PRN    lidocaine (PF) (XYLOCAINE-MPF) 1 % injection    PRN    phenylephrine (QUINCY-SYNEPHRINE) 50 mg (STANDARD CONCENTRATION) in sodium chloride 0 9% 250 mL    Continuous PRN    propofol (DIPRIVAN) 200 MG/20ML bolus injection    PRN    ROCuronium (ZEMURON) injection    PRN    sodium chloride 0 9 % infusion    Continuous PRN       VTE Pharmacologic Prophylaxis: Heparin  VTE Mechanical Prophylaxis: sequential compression device    Portions of the record may have been created with voice recognition software  Occasional wrong word or "sound a like" substitutions may have occurred due to the inherent limitations of voice recognition software    Read the chart carefully and recognize, using context, where substitutions have occurred   ==  Fabiano Vela, [address] [facility]  Internal Medicine Residency PGY-2

## 2021-04-13 NOTE — PROGRESS NOTES
Patient with afib -140s  Cardiology fellow Fred Vela informed  Patient did miss cardizem dose this morning and was given evening dose, however patient with episodes of n/v   Cardizem bolus and gtt to be started  Liliana Michelle with plastic surgery made aware as well  Will continue to monitor  Patient also with anxiety and some confusion  Wife states that patient has had similar reactions to anesthesia in the past   Patient reoriented and offered emotional support  Primary team also made aware

## 2021-04-13 NOTE — UTILIZATION REVIEW
Continued Stay Review    Date: 4/13                       Current Patient Class: IP  Current Level of Care: tele     HPI:71 y o  male initially admitted on 4/7 for elective sx , diabetic foot ulcer w/ fat layer exposed   Assessment/Plan:  Pt for OR for reconstruction microsurgical w/free flap on the right foot  Cont on insulin gtt   Cardiology following for afib w/ RVR on tele, cont meds   4/13 Cardiology Quick Note   Able to wean down cardizem gtt   HR well controlled  4/13 Plastic sx note   POD 1 from ALT reconstruction of R foot defect, subsequent takeback on POD 0 for venous congestion  Plan for OR this am for exploration   Possible need for vein graft , sec free flap        Pertinent Labs/Diagnostic Results:       Results from last 7 days   Lab Units 04/13/21  1412 04/13/21  0555 04/13/21  0033 04/12/21 2324 04/12/21 1947   WBC Thousand/uL 14 27* 11 37*  --   --  10 62*   HEMOGLOBIN g/dL 9 2* 9 7*  --   --  11 6*   I STAT HEMOGLOBIN g/dl  --   --  10 2* 10 9*  --    HEMATOCRIT % 29 6* 30 5*  --   --  37 2   HEMATOCRIT, ISTAT %  --   --  30* 32*  --    PLATELETS Thousands/uL 218 221  --   --  248     Results from last 7 days   Lab Units 04/13/21  1412 04/13/21  0555 04/13/21  0033 04/12/21  2324 04/12/21  1947 04/12/21  1449 04/12/21  1324 04/12/21  1058  04/12/21  0008 04/08/21  0548 04/07/21  1613   SODIUM mmol/L 145 142  --   --  140  --   --   --   --  138 141 140   POTASSIUM mmol/L 4 2 3 9  --   --  4 5  --   --   --   --  3 9 3 8 4 0   CHLORIDE mmol/L 116* 111*  --   --  113*  --   --   --   --  110* 109* 110*   CO2 mmol/L 23 24  --   --  22  --   --   --   --  24 28 25   CO2, I-STAT mmol/L  --   --  23 23  --  24 25 24   < >  --   --   --    ANION GAP mmol/L 6 7  --   --  5  --   --   --   --  4 4 5   BUN mg/dL 11 11  --   --  11  --   --   --   --  16 13 16   CREATININE mg/dL 0 94 0 84  --   --  1 04  --   --   --   --  0 98 0 90 0 82   EGFR ml/min/1 73sq m 82 89  --   --  73  --   -- --   --  78 87 90   CALCIUM mg/dL 8 1* 8 4  --   --  8 3  --   --   --   --  9 0 9 3 9 0   CALCIUM, IONIZED, ISTAT mmol/L  --   --  1 18 1 14  --  1 17 1 19 1 20   < >  --   --   --    MAGNESIUM mg/dL 2 2 1 9  --   --  2 0  --   --   --   --  2 1 2 1 2 0   PHOSPHORUS mg/dL 2 7 1 9*  --   --  2 5  --   --   --   --  3 5  --  2 9    < > = values in this interval not displayed       Results from last 7 days   Lab Units 04/12/21  0008   AST U/L 21   ALT U/L 59   ALK PHOS U/L 111   TOTAL PROTEIN g/dL 6 9   ALBUMIN g/dL 3 5   TOTAL BILIRUBIN mg/dL 0 33     Results from last 7 days   Lab Units 04/13/21  1458 04/13/21  1337 04/13/21  0701 04/13/21  0538 04/13/21  0251 04/13/21  0211 04/13/21  0122 04/12/21  2110 04/12/21  1727 04/12/21  0724 04/12/21  0607 04/11/21  2104   POC GLUCOSE mg/dl 159* 125 109 150* 207* 232* 242* 277* 219* 174* 176* 126     Results from last 7 days   Lab Units 04/13/21  1412 04/13/21  0555 04/12/21  1947 04/12/21  0008 04/08/21  0548 04/07/21  1613   GLUCOSE RANDOM mg/dL 146* 137 310* 158* 114 129     Results from last 7 days   Lab Units 04/13/21  0033 04/12/21  2324 04/12/21  1449 04/12/21  1324 04/12/21  1058   PH, MAGALI I-STAT  7 377 7 418*  --   --   --    PCO2, MAGALI ISTAT mm HG 37 0* 33 9*  --   --   --    PO2, MAGALI ISTAT mm  0* 304 0*  --   --   --    HCO3, MAGALI ISTAT mmol/L 21 7* 21 9*  --   --   --    I STAT BASE EXC mmol/L -3* -2 -3* -2 -3*   I STAT O2 SAT % 100* 100* 99* 99* 99*   ISTAT PH ART   --   --  7 333* 7 361 7 339*   I STAT ART PCO2 mm HG  --   --  42 0 41 4 42 2   I STAT ART PO2 mm HG  --   --  151 0* 153 0* 156 0*   I STAT ART HCO3 mmol/L  --   --  22 3 23 4 22 7     Results from last 7 days   Lab Units 04/13/21  1412 04/12/21  1947   PROTIME seconds 16 4* 15 7*   INR  1 32* 1 25*   PTT seconds 30 34         Vital Signs:  04/13/21 1515  --  74  12  127/71  --  152/70  96 mmHg  97 %  32  --  3 L/min  Nasal cannula  --  --   04/13/21 1500  --  74  12  142/81  --  156/72  94 mmHg 99 %  32  --  3 L/min  Nasal cannula  --  --   04/13/21 1445  --  74  12  142/77  --  140/72  98 mmHg  99 %  32  --  3 L/min  Nasal cannula  --  --   04/13/21 1430  98 8 °F (37 1 °C)  74  12  128/80  --  150/70  96 mmHg  100 %  32  --  3 L/min  Nasal cannula  --  --   04/13/21 1415  --  72  12  134/78  --  152/72  98 mmHg  100 %  32  --  3 L/min  Nasal cannula  --  --   04/13/21 1400  --  76  12  127/73  --  148/70  98 mmHg  99 %  32  --  3 L/min  Nasal cannula  --  --   04/13/21 1345  --  78  12  135/80  --  154/72  100 mmHg  100 %  32  --  3 L/min  Nasal cannula  --  --   04/13/21 1332  98 2 °F (36 8 °C)  90  14  146/71  --  --  --  98 %  32  --  3 L/min  Nasal cannula  --  --   04/13/21 0623  --  --  --  --  --  --  --  97 %  --  --  --  None (Room air)  --  --   04/13/21 0550  98 °F (36 7 °C)  --  --  --  --  --  --  --  --  --  --  --  --  --   04/13/21 0543  --  90  --  123/50  73  --  --  --  --  --  --  --  --  --   04/13/21 0443  --  78  --  108/45Abnormal   64  --  --  --  --  --  --  --  --  --   04/13/21 0413  --  74  --  112/50  67  --  --  --  --  --  --  --  --  --   04/13/21 0343  --  91  --  130/61  82  --  --  --  --  --  --  --  --  --   04/13/21 0328  --  80  --  130/55  77  --  --  --  --  --  --  --  --  --   04/13/21 0313  --  73  12  116/62  83  --  --  --  --  --  --  --  --  Lying   04/13/21 0300  --  --  --  --  --  --  --  96 %  24  --  1 L/min  Nasal cannula  --  --   04/13/21 0253  --  --  --  122/55  82  --  --  --  --  --  --  --  --  Lying   04/13/21 0230  --  84  14  111/63  --  118/52  72 mmHg  99 %  --  --  --  --  --  --   04/13/21 0215  98 9 °F (37 2 °C)  84  16  99/61  --  118/50  72 mmHg  100 %  --  2 L/min  --  Nasal cannula  --  --   04/13/21 0200  --  86  14  106/58  --  116/52  74 mmHg  99 %  --  --  --  --  --  --   04/13/21 0145  --  88  15  108/63  --  124/56  78 mmHg  99 %  --  --  --  --  --  --   04/13/21 0130  --  94  15  119/62  --  --  --  98 %  28  --  2 L/min Nasal cannula  --  --   04/13/21 0115  --  86  14  119/61  --  --  --  99 %  --  --  --  --  --  --   04/13/21 0110  99 1 °F (37 3 °C)  88  14  127/61  --  --                         Medications:   Scheduled Medications:  aspirin, 325 mg, Oral, Daily  atorvastatin, 40 mg, Oral, Daily With Dinner  ceFAZolin, 2,000 mg, Intravenous, Once  diltiazem, 180 mg, Oral, BID  docusate sodium, 100 mg, Oral, BID  doxycycline hyclate, 100 mg, Oral, Q12H SUNG  insulin glargine, 10 Units, Subcutaneous, HS  lisinopril, 40 mg, Oral, Daily  metoprolol tartrate, 50 mg, Oral, Q12H SUNG  senna-docusate sodium, 1 tablet, Oral, HS      Continuous IV Infusions:  dextrose 5 % and sodium chloride 0 45 % with KCl 20 mEq/L, 125 mL/hr, Intravenous, Continuous  heparin (porcine), 3-20 Units/kg/hr (Order-Specific), Intravenous, Titrated  heparin, 20 Units/hr, Intravenous, Continuous  insulin regular (HumuLIN R,NovoLIN R) infusion, 0 3-21 Units/hr, Intravenous, Titrated      PRN Meds:  acetaminophen, 650 mg, Oral, Q4H PRN  fentaNYL, 25 mcg, Intravenous, Q5 Min PRN  heparin (porcine), 2,000 Units, Intravenous, Q1H PRN  heparin (porcine), 4,000 Units, Intravenous, Q1H PRN  HYDROmorphone, 0 5 mg, Intravenous, Q2H PRN  HYDROmorphone, 0 5 mg, Intravenous, Q10 Min PRN  ondansetron, 4 mg, Intravenous, Q6H PRN  ondansetron, 4 mg, Intravenous, Once PRN  oxyCODONE, 10 mg, Oral, Q4H PRN  oxyCODONE, 5 mg, Oral, Q4H PRN        Discharge Plan:  [facility]     Network Utilization Review Department  ATTENTION: Please call with any questions or concerns to [phone] and carefully listen to the prompts so that you are directed to the right person  All voicemails are confidential   Renetta Bee all requests for admission clinical reviews, approved or denied determinations and any other requests to dedicated fax number below belonging to the campus where the patient is receiving treatment   List of dedicated fax numbers for the Facilities:  [address] DENIALS (Administrative/Medical Necessity) [phone]   [address] (Maternity/NICU/Pediatrics) 261 [facility],7Th Floor [address]  [phone]   Sylvia Rene 9468 ([facility]) [address] [address] [place] [address] [phone]   [address] 951 [phone]

## 2021-04-13 NOTE — QUICK NOTE
Chart Update  Messaged by Nurse Christie Pride concerning patient's heart rate  Currently in AF RVR with ventricular rate of 130-140  This morning patient did receive his 50mg metop tartrate but did not receive his morning diltiazem  Currently unable to keep down diltaizem due to nausea and subsequent vomiting, I presume to be secondary to anaesthesia  Starting diltiazem gtt with 15mg bolus dose  Plan communicated to Nurse Christie Pride  She told me she has communicated with primary team about this and that patient's wife reports similar reactions to prior anaesthesia  In the morning, plan to please give his oral metoprolol and diltiazem  After his PO meds have been taken, please titrate down the diltiazem gtt based on his heart rate until it can safely be discontinued altogether      Maureen Valenzuela MD  - PGY-5 Cardiology Fellow  - Tiger text enabled

## 2021-04-13 NOTE — PROGRESS NOTES
Baron Richardson made aware that  signal lost   Doppler still present  Resident to come and assess patient

## 2021-04-13 NOTE — ANESTHESIA PREPROCEDURE EVALUATION
Procedure:  TAKEBACK RECONSTRUCTION MICROSURGICAL W/ FREE FLAP (Right Foot)    Relevant Problems   CARDIO   (+) Atrial fibrillation with RVR (HCC)   (+) Essential hypertension   (+) Hyperlipidemia      ENDO   (+) Type 2 diabetes mellitus with diabetic neuropathy (HCC)      /RENAL   (+) Benign prostatic hyperplasia with lower urinary tract symptoms      NEURO/PSYCH   (+) Type 2 diabetes mellitus with diabetic neuropathy ([address] )     A fib with RVR  Received Metoprolol this AM but missed cardizem dose  Started on Diltiazem gtt  Free flap from earlier in the day,  signal lost, but doppler signal still present  Pt has been nauseous and vomiting, unable to keep meds down    Most recent , received 10 units lantus  Anesthesia Plan  ASA Score- 3 Emergent    Anesthesia Type- general with ASA Monitors  Additional Monitors:   Airway Plan: ETT  Comment: Pt not able to provide consent as slightly confused, likely due to anesthesia as pt has had similar reactions in the past  Formal consent not obtained due to emergency circumstances  RSI due to nausea and vomiting          Plan Factors-    Chart reviewed  Patient summary reviewed  Induction- intravenous and rapid sequence induction  Postoperative Plan-     Informed Consent-   I personally reviewed this patient with the CRNA  Discussed and agreed on the Anesthesia Plan with the CRNA  Britany Seo

## 2021-04-13 NOTE — CONSULTS
Consultation - Infectious Disease   Isidoro Unger  73 y o  male MRN: [id number]  Unit/Bed#: OR POOL Encounter: [id number]      IMPRESSION & RECOMMENDATIONS:   Impression/Recommendations: This is a 73 y o  male, with DM, status post right foot reconstructive surgery, complicated by bleeding and ischemia, requiring TMA and hardware removal   He subsequently had screw fixation, with operative bone scraping having growth of Enterobacter  Patient has been on p o  Doxycycline for the last 6 weeks with open foot wound  He is now status post flap closure of foot wound  1  Possible residual osteomyelitis of right foot  Patient is status post screw fixation right foot on 03/04, with operative bone scraping culture growing Enterobacter  Patient has been on p o  Doxycycline since and is tolerating it well  However, he had a persistent open foot wound  Patient is now status post flap closure of right foot  With successful flap closure, there is no further bone exposed  The best course of action at this point is to have patient continue antibiotic for 6 weeks after flap closure  Continue p o  Doxycycline  Treat x6 weeks after flap closure, through 5/24     2  Open right foot wound  Patient is status post flap closure yesterday, requiring return to OR twice for revision due to congestion  Wound care per Plastic surgery  3  Status post screw fixation right foot on 03/04  4  Status post failed right foot reconstruction, requiring TMA  All hardware prior to [facility] were removed  However, as in above, patient is status post screw fixation of right foot after TMA  5  DM, somewhat poorly controlled, with elevated hemoglobin A1c  Management per Medicine Service  Previous outpatient and hospitalization records reviewed in detail  Discussed with patient in detail regarding the above plan    I will rediscuss with patient all the above issues in the next 24 hours, after he has completely working up from anesthesia  Discussed with Plastic surgery briefly  Thank you for this consultation  We will follow along with you  HISTORY OF PRESENT ILLNESS:  Reason for Consult:  Right foot osteomyelitis  HPI: Uvaldo Kirkland  is a 73 y o  male, with multiple medical problems including DM, who was initially admitted on 02/12 to undergo right foot with reconstructive surgery, with fusion of the midfoot and external fixator application  Patient was readmitted on 02/14 with bleeding and fever  Patient with acute back to OR on 02/17, with evacuation of hematoma and removal of external fixator  Operative culture grew Enterobacter cloaca and Enterococcus  Patient subsequently underwent TMA with hardware removal due to distal foot ischemia  Patient was treated with 1 week of amoxicillin/doxycycline after hardware removal     Subsequently, it was determined the patient needed to have screw fixation of right foot  This was done on 03/04  Bone scraping culture grew Enterobacter again  Patient was initially treated with IV cefepime for few days, eventually transition to p o  Doxycycline for 6 weeks, through 4/15  Patient was admitted on 04/07 to undergo I&D and flap closure  We are asked to evaluate the patient  I&D was done on 04/07  Flap closure was done yesterday, on 04/12  Patient was taken back to the OR twice earlier today for flap revision due to venous congestion  Patient is currently postop  Pain is well controlled  He is sleepy and somewhat confused  He is comfortable  REVIEW OF SYSTEMS:  A complete system-based review was done  Except for what is noted in HPI above, ROS of systems is otherwise negative      PAST MEDICAL HISTORY:  Past Medical History:   Diagnosis Date    Arthritis     Atrial fibrillation [facility])     Diagnosed 11/2018    Benign prostate hyperplasia 04/2002    Cellulitis     right lower leg     Colon polyp 2006    Diabetes mellitus ([address] )     Hearing aid worn     bilat    Hearing loss     90% loss left ear and 40% right ear    History of clubfoot     "since birth"    History of pneumonia     History of TIA (transient ischemic attack) 04/25/2017 11/30/18 pt denies    Hyperlipidemia 5/15/2014    Hypertension     Infectious viral hepatitis     "cant remember type"    Irregular heart beat     Neuropathy     both feet    Osteomyelitis [facility] )     right great toe    Right middle lobe pulmonary nodule 11/6/2018    Seasickness     Teeth missing     Type 2 diabetes mellitus with diabetic neuropathy ([facility] ) 11/6/2018    Wears glasses     reading    Wound, open     bottom of right foot     Past Surgical History:   Procedure Laterality Date    BUNIONECTOMY Right 12/4/2018    Procedure: 5TH METATARSAL BONE PARTIAL RESECTION, FULL THICKNESS DEBRIDEMENT OF DIABETIC ULCER;  Surgeon: Clarisa Garcia DPM;  [facility];  Service: Podiatry    CLUB FOOT RELEASE Bilateral     COLONOSCOPY      EXTERNAL FIXATOR APPLICATION Right 0/43/8627    Procedure: Application multiplane external fixation;  Surgeon: Shelbie Clinton DPM;  [facility];  Service: Podiatry    FOOT HARDWARE REMOVAL Right 2/17/2021    Procedure: REMOVAL EXTERNAL FIXATOR;  Surgeon: Shelbie Clinton DPM;  [facility];  Service: Podiatry    INCISION AND DRAINAGE OF WOUND Right 2/17/2021    Procedure: INCISION AND DRAINAGE (I&D) EXTREMITY;  Surgeon: Shelbie Clinton DPM;  [facility];  Service: Podiatry    ORIF FOOT FRACTURE Right 3/4/2021    Procedure: VAC PLACEMENT; SCREW FIXATION RIGHT FOOT FUSION;  Surgeon: Shelbie Clinton DPM;  [facility];  Service: Podiatry    [address] [place] [address] Right 2/22/2021    Procedure: AMPUTATION TRANSMETATARSAL (TMA), REMOVAL NAIL IM T2 ICF HARDWARE;  Surgeon: Shelbie Clinton DPM;  [facility];  Service: Podiatry    RI FUSION FOOT BONES,MIDTARSAL,OSTEOTMY Right 2/12/2021    Procedure: foot ARTHRODESIS/FUSION;  Surgeon: Rivka Gibbons Vinayak Whitley DPM;  [facility];  Service: Podiatry    KS LENGTH/SHORT LEG/ANKL TENDON,SINGLE Right 2/12/2021    Procedure: LENGTHEN TIBIAL TENDON, TRANS HEEL CORD;  Surgeon: Serena De Santiago DPM;  [facility];  Service: [address] Right 4/12/2021    Procedure: RECONSTRUCTION MICROSURGICAL W/ FREE FLAP;  Surgeon: Lorrie Marrero MD;  [facility];  Service: Plastics    KS MUSCLE-SKIN FLAP,TRUNK Right 4/12/2021    Procedure: TAKEBACK RECONSTRUCTION MICROSURGICAL W/ FREE FLAP;  Surgeon: Lorrie Marrero MD;  [facility];  Service: Plastics    KS PART [address] BONE METATARSAL HEAD,EA Right 12/29/2020    Procedure: EXCISION EXOSTOSIS;  Surgeon: Serena De Santiago DPM;  [facility];  Service: [facility] SURG WND EXTEN/COMPLIC Right 54/02/7458    Procedure: PRIMARY DELAYED CLOSURE;  Surgeon: Serena De Santiago DPM;  [facility];  Service: Podiatry    TOE AMPUTATION Right     partial great toe    TONSILLECTOMY      VAC DRESSING APPLICATION Right 8/8/6717    Procedure: APPLICATION VAC DRESSING EXTREMITY;  Surgeon: Lorrie Marrero MD;  [facility];  Service: Plastics    [facility] Right 3/1/2021    Procedure: DEBRIDEMENT LOWER EXTREMITY ([address] OUT); Surgeon: Lorrie Marrero MD;  [facility];  Service: Plastics    WOUND DEBRIDEMENT Right 4/7/2021    Procedure: DEBRIDEMENT RIGHT FOOT;  Surgeon: Lorrie Marrero MD;  [facility];  Service: Plastics    WOUND DEBRIDEMENT Right 4/12/2021    Procedure: DEBRIDEMENT LOWER EXTREMITY [facility] OUT); Surgeon: Lorrie Marrero MD;  [facility];  Service: Plastics     Problem list reviewed      FAMILY HISTORY:  Non-contributory    SOCIAL HISTORY:  Social History     Substance and Sexual Activity   Alcohol Use Yes    Frequency: 2-4 times a month    Drinks per session: 1 or 2    Comment: socially     Social History     Substance and Sexual Activity   Drug Use Not Currently     Social History     Tobacco Use   Smoking Status Former Smoker    Types: Cigarettes    Quit date: 1988    Years since quittin 4   Smokeless Tobacco Former User   Tobacco Comment    exposure to passive smoke       ALLERGIES:  Allergies   Allergen Reactions    Simvastatin Myalgia    Itraconazole Other (See Comments)     Pt denies knowledge of allergy  Maryann Hamilton MEDICATIONS:  All current active medications have been reviewed  Patient is currently on p o  Doxycycline, with periop cefazolin  PHYSICAL EXAM:  Vitals:  Temp:  [97 5 °F (36 4 °C)-99 1 °F (37 3 °C)] 98 8 °F (37 1 °C)  HR:  [] 74  Resp:  [10-19] 11  BP: ()/() 142/81  SpO2:  [96 %-100 %] 99 %  Temp (24hrs), Av 1 °F (36 7 °C), Min:97 5 °F (36 4 °C), Max:99 1 °F (37 3 °C)  Current: Temperature: 98 8 °F (37 1 °C)     Physical Exam:  General:  Well-nourished, well-developed, in no acute distress  Awake, alert and oriented x 3  Eyes:  Conjunctive clear with no hemorrhages or effusions  Oropharynx:  No ulcers, no lesions, pharynx benign, no tonsillitis  Neck:  Supple, no lymphadenopathy, no mass, nontender  Lungs:  Expansion symmetric, no rales, no wheezing, no accessory muscle use  Cardiac:  Regular rate and rhythm, normal S1, normal S2, no murmurs  Abdomen:  Soft, nondistended, non-tender, no HSM  Extremities:  Mild right leg edema  Right foot with incision/wound without purulence  No erythema  Drain serous  Skin:  No rashes, no ulcers  Neurological:  Moves all four extremities spontaneously, sensation grossly intact    LABS, IMAGING, & OTHER STUDIES:  Lab Results:  I have personally reviewed pertinent labs    Results from last 7 days   Lab Units 21  1412 21  0555 21  0033 21  2324 21  1947 21  1449  21  0008   POTASSIUM mmol/L 4 2 3 9  --   --  4 5  --   --  3 9   CHLORIDE mmol/L 116* 111*  -- --  113*  --   --  110*   CO2 mmol/L 23 24  --   --  22  --   --  24   CO2, I-STAT mmol/L  --   --  23 23  --  24   < >  --    BUN mg/dL 11 11  --   --  11  --   --  16   CREATININE mg/dL 0 94 0 84  --   --  1 04  --   --  0 98   EGFR ml/min/1 73sq m 82 89  --   --  73  --   --  78   GLUCOSE, ISTAT mg/dl  --   --  282* 317*  --  184*   < >  --    CALCIUM mg/dL 8 1* 8 4  --   --  8 3  --   --  9 0   AST U/L  --   --   --   --   --   --   --  21   ALT U/L  --   --   --   --   --   --   --  59   ALK PHOS U/L  --   --   --   --   --   --   --  111    < > = values in this interval not displayed  Results from last 7 days   Lab Units 04/13/21  1412 04/13/21  0555 04/13/21  0033  04/12/21  1947   WBC Thousand/uL 14 27* 11 37*  --   --  10 62*   HEMOGLOBIN g/dL 9 2* 9 7*  --   --  11 6*   I STAT HEMOGLOBIN g/dl  --   --  10 2*   < >  --    PLATELETS Thousands/uL 218 221  --   --  248    < > = values in this interval not displayed  Imaging Studies:   I have personally reviewed pertinent imaging study reports and images in PACS  EKG, Pathology, and Other Studies:   I have personally reviewed pertinent reports

## 2021-04-13 NOTE — ANESTHESIA PREPROCEDURE EVALUATION
Procedure:  RECONSTRUCTION MICROSURGICAL W/ FREE FLAP (Right Foot)    Relevant Problems   CARDIO   (+) Atrial fibrillation with RVR (HCC)   (+) Essential hypertension   (+) Hyperlipidemia      ENDO   (+) Type 2 diabetes mellitus with diabetic neuropathy (HCC)      /RENAL   (+) Benign prostatic hyperplasia with lower urinary tract symptoms      NEURO/PSYCH   (+) Type 2 diabetes mellitus with diabetic neuropathy ([address] )     A fib with RVR  Received Metoprolol this AM but missed cardizem dose  Started on Diltiazem gtt  Free flap from earlier in the day,  signal lost, but doppler signal still present  Pt has been nauseous and vomiting, unable to keep meds down    Most recent , received 10 units lantus  Anesthesia Plan  ASA Score- 3 Emergent    Anesthesia Type- general with ASA Monitors  Additional Monitors:   Airway Plan: ETT  Comment: Pt not able to provide consent as still confused  Consent deferred given emergent nature    RSI due to nausea and vomiting          Plan Factors-    Chart reviewed  EKG reviewed  Imaging results reviewed  Existing labs reviewed  Patient summary reviewed  Induction- intravenous and rapid sequence induction  Postoperative Plan-     Informed Consent-   I personally reviewed this patient with the CRNA  Discussed and agreed on the Anesthesia Plan with the CRNA  Maryann Hamilton

## 2021-04-14 PROBLEM — G47.9 SLEEP DISTURBANCE: Status: ACTIVE | Noted: 2021-04-14

## 2021-04-14 LAB
ABO GROUP BLD BPU: NORMAL
ABO GROUP BLD BPU: NORMAL
ANION GAP SERPL CALCULATED.3IONS-SCNC: 4 MMOL/L (ref 4–13)
APTT PPP: 58 SECONDS (ref 23–37)
APTT PPP: 59 SECONDS (ref 23–37)
APTT PPP: 76 SECONDS (ref 23–37)
BPU ID: NORMAL
BPU ID: NORMAL
BUN SERPL-MCNC: 9 MG/DL (ref 5–25)
CALCIUM SERPL-MCNC: 8.2 MG/DL (ref 8.3–10.1)
CHLORIDE SERPL-SCNC: 115 MMOL/L (ref 100–108)
CO2 SERPL-SCNC: 23 MMOL/L (ref 21–32)
CREAT SERPL-MCNC: 0.81 MG/DL (ref 0.6–1.3)
CROSSMATCH: NORMAL
CROSSMATCH: NORMAL
ERYTHROCYTE [DISTWIDTH] IN BLOOD BY AUTOMATED COUNT: 15.8 % (ref 11.6–15.1)
GFR SERPL CREATININE-BSD FRML MDRD: 91 ML/MIN/1.73SQ M
GLUCOSE SERPL-MCNC: 102 MG/DL (ref 65–140)
GLUCOSE SERPL-MCNC: 122 MG/DL (ref 65–140)
GLUCOSE SERPL-MCNC: 128 MG/DL (ref 65–140)
GLUCOSE SERPL-MCNC: 135 MG/DL (ref 65–140)
GLUCOSE SERPL-MCNC: 135 MG/DL (ref 65–140)
GLUCOSE SERPL-MCNC: 150 MG/DL (ref 65–140)
GLUCOSE SERPL-MCNC: 152 MG/DL (ref 65–140)
GLUCOSE SERPL-MCNC: 158 MG/DL (ref 65–140)
GLUCOSE SERPL-MCNC: 170 MG/DL (ref 65–140)
GLUCOSE SERPL-MCNC: 179 MG/DL (ref 65–140)
HCT VFR BLD AUTO: 30.4 % (ref 36.5–49.3)
HGB BLD-MCNC: 9.6 G/DL (ref 12–17)
MCH RBC QN AUTO: 30 PG (ref 26.8–34.3)
MCHC RBC AUTO-ENTMCNC: 31.6 G/DL (ref 31.4–37.4)
MCV RBC AUTO: 95 FL (ref 82–98)
PLATELET # BLD AUTO: 210 THOUSANDS/UL (ref 149–390)
PMV BLD AUTO: 10.8 FL (ref 8.9–12.7)
POTASSIUM SERPL-SCNC: 4.1 MMOL/L (ref 3.5–5.3)
RBC # BLD AUTO: 3.2 MILLION/UL (ref 3.88–5.62)
SODIUM SERPL-SCNC: 142 MMOL/L (ref 136–145)
UNIT DISPENSE STATUS: NORMAL
UNIT DISPENSE STATUS: NORMAL
UNIT PRODUCT CODE: NORMAL
UNIT PRODUCT CODE: NORMAL
UNIT RH: NORMAL
UNIT RH: NORMAL
WBC # BLD AUTO: 12.84 THOUSAND/UL (ref 4.31–10.16)

## 2021-04-14 PROCEDURE — NC001 PR NO CHARGE: Performed by: PLASTIC SURGERY

## 2021-04-14 PROCEDURE — 85027 COMPLETE CBC AUTOMATED: CPT | Performed by: STUDENT IN AN ORGANIZED HEALTH CARE EDUCATION/TRAINING PROGRAM

## 2021-04-14 PROCEDURE — 85730 THROMBOPLASTIN TIME PARTIAL: CPT | Performed by: PLASTIC SURGERY

## 2021-04-14 PROCEDURE — 82948 REAGENT STRIP/BLOOD GLUCOSE: CPT

## 2021-04-14 PROCEDURE — 80048 BASIC METABOLIC PNL TOTAL CA: CPT | Performed by: SURGERY

## 2021-04-14 PROCEDURE — 99232 SBSQ HOSP IP/OBS MODERATE 35: CPT | Performed by: PODIATRIST

## 2021-04-14 PROCEDURE — 99232 SBSQ HOSP IP/OBS MODERATE 35: CPT | Performed by: INTERNAL MEDICINE

## 2021-04-14 RX ORDER — DOXYCYCLINE HYCLATE 100 MG/1
100 CAPSULE ORAL EVERY 12 HOURS SCHEDULED
Status: COMPLETED | OUTPATIENT
Start: 2021-04-14 | End: 2021-04-22

## 2021-04-14 RX ORDER — LANOLIN ALCOHOL/MO/W.PET/CERES
3 CREAM (GRAM) TOPICAL
Status: DISCONTINUED | OUTPATIENT
Start: 2021-04-14 | End: 2021-04-30 | Stop reason: HOSPADM

## 2021-04-14 RX ORDER — DIAZEPAM 5 MG/1
5 TABLET ORAL EVERY 6 HOURS PRN
Status: DISCONTINUED | OUTPATIENT
Start: 2021-04-14 | End: 2021-04-28

## 2021-04-14 RX ORDER — METOPROLOL SUCCINATE 50 MG/1
50 TABLET, EXTENDED RELEASE ORAL 2 TIMES DAILY
Status: DISCONTINUED | OUTPATIENT
Start: 2021-04-14 | End: 2021-04-14

## 2021-04-14 RX ORDER — INSULIN GLARGINE 100 [IU]/ML
15 INJECTION, SOLUTION SUBCUTANEOUS EVERY MORNING
Status: DISCONTINUED | OUTPATIENT
Start: 2021-04-14 | End: 2021-04-30 | Stop reason: HOSPADM

## 2021-04-14 RX ORDER — METOPROLOL SUCCINATE 25 MG/1
25 TABLET, EXTENDED RELEASE ORAL ONCE
Status: COMPLETED | OUTPATIENT
Start: 2021-04-14 | End: 2021-04-14

## 2021-04-14 RX ORDER — HYDROXYZINE HYDROCHLORIDE 25 MG/1
25 TABLET, FILM COATED ORAL ONCE
Status: DISCONTINUED | OUTPATIENT
Start: 2021-04-14 | End: 2021-04-15

## 2021-04-14 RX ADMIN — DOXYCYCLINE 100 MG: 100 CAPSULE ORAL at 08:38

## 2021-04-14 RX ADMIN — DOCUSATE SODIUM 100 MG: 100 CAPSULE, LIQUID FILLED ORAL at 08:37

## 2021-04-14 RX ADMIN — DOCUSATE SODIUM AND SENNOSIDES 1 TABLET: 8.6; 5 TABLET ORAL at 21:51

## 2021-04-14 RX ADMIN — HEPARIN SODIUM 2000 UNITS: 1000 INJECTION INTRAVENOUS; SUBCUTANEOUS at 03:56

## 2021-04-14 RX ADMIN — DOCUSATE SODIUM 100 MG: 100 CAPSULE, LIQUID FILLED ORAL at 17:09

## 2021-04-14 RX ADMIN — DILTIAZEM HYDROCHLORIDE 180 MG: 60 TABLET, FILM COATED ORAL at 08:37

## 2021-04-14 RX ADMIN — INSULIN LISPRO 1 UNITS: 100 INJECTION, SOLUTION INTRAVENOUS; SUBCUTANEOUS at 11:36

## 2021-04-14 RX ADMIN — MELATONIN TAB 3 MG 3 MG: 3 TAB at 21:51

## 2021-04-14 RX ADMIN — DILTIAZEM HYDROCHLORIDE 180 MG: 60 TABLET, FILM COATED ORAL at 17:09

## 2021-04-14 RX ADMIN — INSULIN LISPRO 5 UNITS: 100 INJECTION, SOLUTION INTRAVENOUS; SUBCUTANEOUS at 11:37

## 2021-04-14 RX ADMIN — HEPARIN SODIUM 17 UNITS/KG/HR: 10000 INJECTION, SOLUTION INTRAVENOUS at 10:24

## 2021-04-14 RX ADMIN — HEPARIN SODIUM 2000 UNITS: 1000 INJECTION INTRAVENOUS; SUBCUTANEOUS at 11:36

## 2021-04-14 RX ADMIN — LISINOPRIL 40 MG: 20 TABLET ORAL at 08:38

## 2021-04-14 RX ADMIN — INSULIN LISPRO 5 UNITS: 100 INJECTION, SOLUTION INTRAVENOUS; SUBCUTANEOUS at 17:10

## 2021-04-14 RX ADMIN — DIAZEPAM 5 MG: 5 TABLET ORAL at 20:30

## 2021-04-14 RX ADMIN — INSULIN GLARGINE 15 UNITS: 100 INJECTION, SOLUTION SUBCUTANEOUS at 09:04

## 2021-04-14 RX ADMIN — DOXYCYCLINE 100 MG: 100 CAPSULE ORAL at 20:28

## 2021-04-14 RX ADMIN — INSULIN LISPRO 5 UNITS: 100 INJECTION, SOLUTION INTRAVENOUS; SUBCUTANEOUS at 09:04

## 2021-04-14 RX ADMIN — DEXTROSE, SODIUM CHLORIDE, AND POTASSIUM CHLORIDE 125 ML/HR: 5; .45; .15 INJECTION INTRAVENOUS at 06:28

## 2021-04-14 RX ADMIN — METOPROLOL SUCCINATE 25 MG: 25 TABLET, FILM COATED, EXTENDED RELEASE ORAL at 10:51

## 2021-04-14 RX ADMIN — ASPIRIN 325 MG ORAL TABLET 325 MG: 325 PILL ORAL at 08:37

## 2021-04-14 RX ADMIN — METOPROLOL TARTRATE 50 MG: 50 TABLET, FILM COATED ORAL at 08:37

## 2021-04-14 RX ADMIN — METOPROLOL SUCCINATE 75 MG: 50 TABLET, EXTENDED RELEASE ORAL at 20:28

## 2021-04-14 RX ADMIN — ATORVASTATIN CALCIUM 40 MG: 40 TABLET, FILM COATED ORAL at 17:09

## 2021-04-14 NOTE — PROGRESS NOTES
Progress Note - Cardiology   Kaylan Falcon  73 y o  male MRN: [id number]  Unit/Bed#: [facility] 525-01 Encounter: [id number]    Assessment and Plan:  Atrial fibrillation with RVR  Echo 2018 - EF 50%, no regional wall abnormalities, mildly dilated LV, RA  Prior EKGs reviewed - chronic afib  Pt reported to be in Afib with RVR and hypotensive during R foot debridement and skin flap reconstruction 4/07/21  Went to OR yesterday, now s/p flap closure of foot wound   · Home regimen was PO cardizem 120mg BID, Eliquis 5mg qd   · Regimen increased to PO Cardizem 180mg BID, metoprolol succinate 50mg BID this past weekend  · Rate control 110 bpm overnight, in the 120s this morning  · Will increase metoprolol to 75mg BID  Patient received 50mg dose this morning, will give extra dose of 25mg now  · Anticoagulation per surgery team  · Concern over the weekend that 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, and will eventually end up with pacemaker at some point   · Monitor on telemetry  · Optimize electrolytes     Hyperlipidemia  · Currently on Lipitor 40mg qd  · Lipid profile 2018 - Total 140, , HDL 38, LDL 72        Subjective: Pt was seen and examined in bed  He denies any complaints of chest pain, palpitations, shortness of breath  Discussed that we may increase his medication and patient agreed           Vitals: /91 (BP Location: Right arm)   Pulse (!) 111   Temp 98 2 °F (36 8 °C) (Oral)   Resp 18   Ht 6' 5" (1 956 m)   Wt 108 kg (237 lb 1 3 oz)   SpO2 97%   BMI 28 11 kg/m²   Vitals:    04/07/21 1117 04/07/21 1714   Weight: 108 kg (238 lb) 108 kg (237 lb 1 3 oz)     Orthostatic Blood Pressures      Most Recent Value   Blood Pressure  164/91 filed at 04/14/2021 6814   Patient Position - Orthostatic VS  Lying filed at 04/14/2021 9627            Intake/Output Summary (Last 24 hours) at 4/14/2021 0802  Last data filed at 4/14/2021 8725  Gross per 24 hour   Intake 4363 78 ml   Output 2950 ml   Net 1413 78 ml       Invasive Devices     Peripheral Intravenous Line            Peripheral IV 04/11/21 Distal;Upper;Left Arm 2 days    Peripheral IV 04/12/21 Right Forearm 1 day          Arterial Line            Arterial Line Left Radial -- days          Drain            Closed/Suction Drain Right Thigh Bulb 15 Fr  1 day    Urethral Catheter Temperature probe 16 Fr  1 day                Review of Systems   Constitutional: Negative for chills, diaphoresis and fever  HENT: Negative  Eyes: Negative for visual disturbance  Respiratory: Negative for cough, chest tightness and shortness of breath  Cardiovascular: Negative for chest pain, palpitations and leg swelling  Gastrointestinal: Negative for abdominal distention, abdominal pain, nausea and vomiting  Genitourinary: Negative for difficulty urinating  Neurological: Negative  Psychiatric/Behavioral: Negative for agitation  Physical Exam  Vitals signs reviewed  Constitutional:       General: He is not in acute distress  Appearance: Normal appearance  HENT:      Head: Normocephalic and atraumatic  Nose: Nose normal       Mouth/Throat:      Mouth: Mucous membranes are moist       Pharynx: Oropharynx is clear  Eyes:      Extraocular Movements: Extraocular movements intact  Conjunctiva/sclera: Conjunctivae normal       Pupils: Pupils are equal, round, and reactive to light  Cardiovascular:      Rate and Rhythm: Tachycardia present  Rhythm irregular  Pulses: Normal pulses  Heart sounds: No murmur  Pulmonary:      Effort: Pulmonary effort is normal  No respiratory distress  Breath sounds: Normal breath sounds  Abdominal:      General: Abdomen is flat  Bowel sounds are normal  There is no distension  Palpations: Abdomen is soft  Tenderness: There is no abdominal tenderness  Musculoskeletal:      Comments: S/p R-foot free flap reconstruction    Neurological:      General: No focal deficit present  Mental Status: He is alert and oriented to person, place, and time  Psychiatric:         Mood and Affect: Mood normal          Behavior: Behavior normal        Lab Results:   I have personally reviewed pertinent lab results  CBC with diff:   Results from last 7 days   Lab Units 04/14/21  0630   WBC Thousand/uL 12 84*   RBC Million/uL 3 20*   HEMOGLOBIN g/dL 9 6*   HEMATOCRIT % 30 4*   MCV fL 95   MCH pg 30 0   MCHC g/dL 31 6   RDW % 15 8*   MPV fL 10 8   PLATELETS Thousands/uL 210     CMP:   Results from last 7 days   Lab Units 04/14/21  0449  04/13/21  0033  04/12/21  0008   SODIUM mmol/L 142   < >  --    < > 138   POTASSIUM mmol/L 4 1   < >  --    < > 3 9   CHLORIDE mmol/L 115*   < >  --    < > 110*   CO2 mmol/L 23   < >  --    < > 24   CO2, I-STAT mmol/L  --   --  23   < >  --    BUN mg/dL 9   < >  --    < > 16   CREATININE mg/dL 0 81   < >  --    < > 0 98   GLUCOSE, ISTAT mg/dl  --   --  282*   < >  --    CALCIUM mg/dL 8 2*   < >  --    < > 9 0   AST U/L  --   --   --   --  21   ALT U/L  --   --   --   --  59   ALK PHOS U/L  --   --   --   --  111   EGFR ml/min/1 73sq m 91   < >  --    < > 78    < > = values in this interval not displayed  Imaging: I have personally reviewed pertinent reports  EKG:  I have personally reviewed pertinent reports      VTE Pharmacologic Prophylaxis: Heparin  VTE Mechanical Prophylaxis: sequential compression device NINO Atkinson  MS4

## 2021-04-14 NOTE — PLAN OF CARE
Problem: Potential for Falls  Goal: Patient will remain free of falls  Description: INTERVENTIONS:  - Assess patient frequently for physical needs  -  Identify cognitive and physical deficits and behaviors that affect risk of falls  -  [place] fall precautions as indicated by assessment   - Educate patient/family on patient safety including physical limitations  - Instruct patient to call for assistance with activity based on assessment  - Modify environment to reduce risk of injury  - Consider OT/PT consult to assist with strengthening/mobility  4/14/2021 0954 by Arby Galeazzi, RN  Outcome: Progressing  4/14/2021 0954 by Arby Galeazzi, RN  Outcome: Progressing     Problem: INFECTION - ADULT  Goal: Absence or prevention of progression during hospitalization  Description: INTERVENTIONS:  - Assess and monitor for signs and symptoms of infection  - Monitor lab/diagnostic results  - Monitor all insertion sites, i e  indwelling lines, tubes, and drains  - Monitor endotracheal if appropriate and nasal secretions for changes in amount and color  - [place] appropriate cooling/warming therapies per order  - Administer medications as ordered  - Instruct and encourage patient and family to use good hand hygiene technique  - Identify and instruct in appropriate isolation precautions for identified infection/condition  4/14/2021 0954 by Arby Galeazzi, RN  Outcome: Progressing  4/14/2021 0954 by Arby Galeazzi, RN  Outcome: Progressing  Goal: Absence of fever/infection during neutropenic period  Description: INTERVENTIONS:  - Monitor WBC    4/14/2021 0954 by Arby Galeazzi, RN  Outcome: Progressing  4/14/2021 0954 by Arby Galeazzi, RN  Outcome: Progressing     Problem: SAFETY ADULT  Goal: Patient will remain free of falls  Description: INTERVENTIONS:  - Assess patient frequently for physical needs  -  Identify cognitive and physical deficits and behaviors that affect risk of falls    -  [place] fall precautions as indicated by assessment   - Educate patient/family on patient safety including physical limitations  - Instruct patient to call for assistance with activity based on assessment  - Modify environment to reduce risk of injury  - Consider OT/PT consult to assist with strengthening/mobility  4/14/2021 0954 by Mehul Major RN  Outcome: Progressing  4/14/2021 0954 by Mehul Major RN  Outcome: Progressing  Goal: Maintain or return to baseline ADL function  Description: INTERVENTIONS:  -  Assess patient's ability to carry out ADLs; assess patient's baseline for ADL function and identify physical deficits which impact ability to perform ADLs (bathing, care of mouth/teeth, toileting, grooming, dressing, etc )  - Assess/evaluate cause of self-care deficits   - Assess range of motion  - Assess patient's mobility; develop plan if impaired  - Assess patient's need for assistive devices and provide as appropriate  - Encourage maximum independence but intervene and supervise when necessary  - Involve family in performance of ADLs  - Assess for home care needs following discharge   - Consider OT consult to assist with ADL evaluation and planning for discharge  - Provide patient education as appropriate  4/14/2021 0954 by Mehul Major RN  Outcome: Progressing  4/14/2021 0954 by Mehul Major RN  Outcome: Progressing  Goal: Maintain or return mobility status to optimal level  Description: INTERVENTIONS:  - Assess patient's baseline mobility status (ambulation, transfers, stairs, etc )    - Identify cognitive and physical deficits and behaviors that affect mobility  - Identify mobility aids required to assist with transfers and/or ambulation (gait belt, sit-to-stand, lift, walker, cane, etc )  - [place] fall precautions as indicated by assessment  - Record patient progress and toleration of activity level on Mobility SBAR; progress patient to next Phase/Stage  - Instruct patient to call for assistance with activity based on assessment  - Consider rehabilitation consult to assist with strengthening/weightbearing, etc   4/14/2021 0954 by Susanna Lopez RN  Outcome: Progressing  4/14/2021 0954 by Susanna Lopez RN  Outcome: Progressing     Problem: Nutrition/Hydration-ADULT  Goal: Nutrient/Hydration intake appropriate for improving, restoring or maintaining nutritional needs  Description: Monitor and assess patient's nutrition/hydration status for malnutrition  Collaborate with interdisciplinary team and initiate plan and interventions as ordered  Monitor patient's weight and dietary intake as ordered or per policy  Utilize nutrition screening tool and intervene as necessary  Determine patient's food preferences and provide high-protein, high-caloric foods as appropriate       INTERVENTIONS:  - Monitor oral intake, urinary output, labs, and treatment plans  - Assess nutrition and hydration status and recommend course of action  - Evaluate amount of meals eaten  - Assist patient with eating if necessary   - Allow adequate time for meals  - Recommend/ encourage appropriate diets, oral nutritional supplements, and vitamin/mineral supplements  - Order, calculate, and assess calorie counts as needed  - Recommend, monitor, and adjust tube feedings and TPN/PPN based on assessed needs  - Assess need for intravenous fluids  - Provide specific nutrition/hydration education as appropriate  - Include patient/family/caregiver in decisions related to nutrition  4/14/2021 0954 by Susanna Lopez RN  Outcome: Progressing  4/14/2021 0954 by Susanna Lopez RN  Outcome: Progressing     Problem: Prexisting or High Potential for Compromised Skin Integrity  Goal: Skin integrity is maintained or improved  Description: INTERVENTIONS:  - Identify patients at risk for skin breakdown  - Assess and monitor skin integrity  - Assess and monitor nutrition and hydration status  - Monitor labs   - Assess for incontinence   - Turn and reposition patient  - Assist with mobility/ambulation  - Relieve pressure over bony prominences  - Avoid friction and shearing  - Provide appropriate hygiene as needed including keeping skin clean and dry  - Evaluate need for skin moisturizer/barrier cream  - Collaborate with interdisciplinary team   - Patient/family teaching  - Consider wound care consult   4/14/2021 0954 by Faye Matta RN  Outcome: Progressing  4/14/2021 0954 by Faye Matta RN  Outcome: Progressing

## 2021-04-14 NOTE — PROGRESS NOTES
Progress Note - Plastic Surgery   Rickey Laird  73 y o  male MRN: [id number]  Unit/Bed#: [facility] 525-01 Encounter: [id number]    Assessment:  Principal Problem:    Type 2 diabetes mellitus with diabetic neuropathy ([place] [address] )  Active Problems:    Atrial fibrillation with RVR ([place] [address] )    Hyperlipidemia    Essential hypertension    Acquired absence of other right toe(s) ([place] [address] )    Deep disruption or dehiscence of operation wound    Ambulatory dysfunction    Diabetic ulcer of right 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, with fat layer exposed ([place] [address] )    S/P transmetatarsal amputation of foot, right ([place] [address] )    73 y o  male POD 2 from ALT reconstruction of R foot defect, subsequent takebacks on POD 0 and 1 for venous congestion  Overall doing well  Clinically stable   VSS, A fib rate controlled  R foot free flap doing well    Plan:  - Continue Q1 hr flap checks, strict bedrest, Astorga catheter, A line  - Added Valium PRN for anxiety  - Continue heparin drip  - Adv diet to regular, decrease IVF  - Appreciate assistance of ID and IM  - Discussed with Dr Drake Lewis MD  Resident, Plastic & Reconstructive Surgery  [phone] (c)     Subjective/Objective   Chief Complaint: R foot wound    Subjective: resting in bed  Pain controlled  Some anxiety overnight, worrying that his foot is falling off the pillow  Objective:     Blood pressure 164/91, pulse (!) 111, temperature 98 2 °F (36 8 °C), temperature source Oral, resp  rate 18, height 6' 5" (1 956 m), weight 108 kg (237 lb 1 3 oz), SpO2 97 %  ,Body mass index is 28 11 kg/m²        Intake/Output Summary (Last 24 hours) at 4/14/2021 0829  Last data filed at 4/14/2021 9776  Gross per 24 hour   Intake 4363 78 ml   Output 2950 ml   Net 1413 78 ml       Invasive Devices     Peripheral Intravenous Line            Peripheral IV 04/11/21 Distal;Upper;Left Arm 2 days    Peripheral IV 04/12/21 Right Forearm 2 days          Arterial Line            Arterial Line Left Radial -- days          Drain            Urethral Catheter Temperature probe 16 Fr  2 days    Closed/Suction Drain Right Thigh Bulb 15 Fr  1 day                Physical Exam:   Gen: NAD  Neuro: Alert, oriented  CV: regular rate  Pulm: no respiratory distress  Abd: nondistended  GI: Astorga catheter  Ext: RLE free flap is pink, soft, warm  Cap refill is about 2 seconds  Micro flow  with strong venous doppler signal via A, no signal via B  Vioptix with perfusion in the mid 50% range, signal quality > 90%  Arterial and venous signals at both marking sutures on the flap  Some serosang ooze from flap edges     Lab, Imaging and other studies:I have personally reviewed pertinent lab results      VTE Pharmacologic Prophylaxis: Sequential compression device (Venodyne), heparin drip  VTE Mechanical Prophylaxis: sequential compression device

## 2021-04-14 NOTE — PROGRESS NOTES
Podiatry - Progress Note  Patient: Gema Magallon  73 y o  male   MRN: [id number]  PCP: Michael Lambert MD  [facility] Encounter: [id number]  Date Of Visit: 21    ASSESSMENT:    Gema Magallon  is a 73 y o  male with:    1  Left 2nd digit wound - [place] 1  2  T2DM  3  Afib with RVR  4  Hypotension  5  Right foot wounds - managed by Plastic Surgery - POA  - S/p exfix removal, I&D, fasciotomy (DOS 21)  - s/p TMA of left foot   - s/p debridement and vac application per [facility] (17)  - s/p Percutaneous screw fixation right medial column (3/4/21)  - s/p debridement (21)    PLAN:    · Band-aid applied to left 2nd digit  Wound is beginning to epithelialize and is stable at this time without any signs of infection  · Podiatry signing off at this time, thank you for the consultation  · Elevation on green foam wedges or pillows when non-ambulatory  · Rest of care per primary team      Weightbearing status: WBAT to left foot    SUBJECTIVE:     The patient was seen, evaluated, and assessed at bedside today  The patient was awake, alert, and in no acute distress  No acute events overnight  The patient reports no pain to his left foot  Patient denies N/V/F/chills/SOB/CP  OBJECTIVE:     Vitals:   BP (!) 179/81   Pulse (!) 132   Temp 98 2 °F (36 8 °C) (Oral)   Resp 18   Ht 6' 5" (1 956 m)   Wt 108 kg (237 lb 1 3 oz)   SpO2 97%   BMI 28 11 kg/m²     Temp (24hrs), Av 3 °F (36 8 °C), Min:97 3 °F (36 3 °C), Max:98 9 °F (37 2 °C)      Physical Exam:     General: Alert, cooperative and no distress  Lungs: Non labored breathing  Abdomen: Soft, non-tender  Lower extremity exam:  Cardiovascular status at baseline  Neurological status at baseline  Musculoskeletal status at baseline  No calf tenderness noted  Ulceration to left 2nd digit is beginning to epithelialize  No local signs of infection to the area       Additional Data:     Labs:    Results from last 7 days   Lab Units 04/14/21  0630   WBC Thousand/uL 12 84*   HEMOGLOBIN g/dL 9 6*   HEMATOCRIT % 30 4*   PLATELETS Thousands/uL 210     Results from last 7 days   Lab Units 04/14/21  0449  04/13/21  0033  04/12/21  0008   POTASSIUM mmol/L 4 1   < >  --    < > 3 9   CHLORIDE mmol/L 115*   < >  --    < > 110*   CO2 mmol/L 23   < >  --    < > 24   CO2, I-STAT mmol/L  --   --  23   < >  --    BUN mg/dL 9   < >  --    < > 16   CREATININE mg/dL 0 81   < >  --    < > 0 98   CALCIUM mg/dL 8 2*   < >  --    < > 9 0   ALK PHOS U/L  --   --   --   --  111   ALT U/L  --   --   --   --  59   AST U/L  --   --   --   --  21   GLUCOSE, ISTAT mg/dl  --   --  282*   < >  --     < > = values in this interval not displayed  Results from last 7 days   Lab Units 04/13/21  1412   INR  1 32*       * I Have Reviewed All Lab Data Listed Above  Recent Cultures (last 7 days):               Imaging: I have personally reviewed pertinent films in PACS  EKG, Pathology, and Other Studies: I have personally reviewed pertinent reports  ** Please Note: Portions of the record may have been created with voice recognition software  Occasional wrong word or "sound a like" substitutions may have occurred due to the inherent limitations of voice recognition software  Read the chart carefully and recognize, using context, where substitutions have occurred   **

## 2021-04-14 NOTE — PROGRESS NOTES
Progress Note - Infectious Disease   Dulce Nuno  73 y o  male MRN: [id number]  Unit/Bed#: [facility] 525-01 Encounter: [id number]      Impression/Recommendations:  1  Possible residual osteomyelitis of right foot  Patient is status post screw fixation right foot on , with operative bone scraping culture growing Enterobacter  Patient has been on p o  Doxycycline since and is tolerating it well  However, he had a persistent open foot wound  Patient is now status post flap closure of right foot  With successful flap closure, there is no further bone exposed  The best course of action at this point is to have patient continue antibiotic for 6 weeks after flap closure  I discussed this with the patient in detail and he is agreeable  Continue p o  Doxycycline  Treat x6 weeks after flap closure, through       2  Open right foot wound  Patient is status post flap closure yesterday, requiring return to OR twice for revision due to congestion  Wound is clean, without evidence of cellulitis  Wound care per Plastic surgery      3  Status post screw fixation right foot on       4  Status post failed right foot reconstruction, requiring TMA  All hardware prior to [facility] were removed  However, as in above, patient is status post screw fixation of right foot after TMA      5  DM, somewhat poorly controlled, with elevated hemoglobin A1c  Management per Medicine Service      Discussed with patient in detail regarding the above plan  Antibiotics:  Doxycycline  POD # 2     Subjective:  Patient is much more awake and alert  Minimal pain in right foot  Temperature stays down  No chills  He is tolerating antibiotic well  No nausea, vomiting or diarrhea      Objective:  Vitals:  Temp:  [97 3 °F (36 3 °C)-98 9 °F (37 2 °C)] 97 9 °F (36 6 °C)  HR:  [] 85  Resp:  [15-18] 18  BP: (130-179)/(73-91) 176/85  SpO2:  [97 %-100 %] 98 %  Temp (24hrs), Av 2 °F (36 8 °C), Min:97 3 °F (36 3 °C), Max:98 9 °F (37 2 °C)  Current: Temperature: 97 9 °F (36 6 °C)    Physical Exam:     General: Awake, alert, cooperative, no distress  Neck:  Supple  No mass  No lymphadenopathy  Lungs: Expansion symmetric, no rales, no wheezing, respirations unlabored  Heart:  Regular rate and rhythm, S1 and S2 normal, no murmur  Abdomen: Soft, nondistended, non-tender, bowel sounds active all four quadrants, no masses, no organomegaly  Extremities: Stable mild leg edema  Right foot flap wound clean, without purulence  No erythema/warmth  Nontender  Skin:  No rash  Neuro: Moves all extremities  Invasive Devices     Peripheral Intravenous Line            Peripheral IV 04/11/21 Distal;Upper;Left Arm 2 days    Peripheral IV 04/12/21 Right Forearm 2 days          Arterial Line            Arterial Line Left Radial -- days          Drain            Closed/Suction Drain Right Thigh Bulb 15 Fr  2 days    Urethral Catheter Temperature probe 16 Fr  2 days                Labs studies:   I have personally reviewed pertinent labs  Results from last 7 days   Lab Units 04/14/21  0449 04/13/21  1412 04/13/21  0555 04/13/21  0033 04/12/21  2324  04/12/21  1449  04/12/21  0008   POTASSIUM mmol/L 4 1 4 2 3 9  --   --    < >  --   --  3 9   CHLORIDE mmol/L 115* 116* 111*  --   --    < >  --   --  110*   CO2 mmol/L 23 23 24  --   --    < >  --   --  24   CO2, I-STAT mmol/L  --   --   --  23 23  --  24   < >  --    BUN mg/dL 9 11 11  --   --    < >  --   --  16   CREATININE mg/dL 0 81 0 94 0 84  --   --    < >  --   --  0 98   EGFR ml/min/1 73sq m 91 82 89  --   --    < >  --   --  78   GLUCOSE, ISTAT mg/dl  --   --   --  282* 317*  --  184*   < >  --    CALCIUM mg/dL 8 2* 8 1* 8 4  --   --    < >  --   --  9 0   AST U/L  --   --   --   --   --   --   --   --  21   ALT U/L  --   --   --   --   --   --   --   --  59   ALK PHOS U/L  --   --   --   --   --   --   --   --  111    < > = values in this interval not displayed       Results from last 7 days Lab Units 04/14/21  0630 04/13/21  1412 04/13/21  0555   WBC Thousand/uL 12 84* 14 27* 11 37*   HEMOGLOBIN g/dL 9 6* 9 2* 9 7*   PLATELETS Thousands/uL 210 218 221           Imaging Studies:   I have personally reviewed pertinent imaging study reports and images in PACS  EKG, Pathology, and Other Studies:   I have personally reviewed pertinent reports

## 2021-04-14 NOTE — PROGRESS NOTES
INTERNAL MEDICINE RESIDENCY PROGRESS NOTE     Name: Jose Martinez  Age & Sex: 73 y o  male   MRN: [id number]  Unit/Bed#: [facility] 525-01   Encounter: [id number]  Team: SOD Team [place]     PATIENT INFORMATION     Name: Jose Martinez  Age & Sex: 73 y o  male   MRN: [id number]  Hospital Stay Days: 7    ASSESSMENT/PLAN     Principal Problem:    S/P transmetatarsal amputation of foot, right ([facility] 75 )  Active Problems:    Hyperlipidemia    Type 2 diabetes mellitus with diabetic neuropathy (HCC)    Atrial fibrillation with RVR ([facility])    Essential hypertension    Acquired absence of other right toe(s) ([facility] )    Deep disruption or dehiscence of operation wound    Ambulatory dysfunction    Diabetic ulcer of right 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, with fat layer exposed ([place] [address] )    Sleep disturbance      Sleep disturbance  Assessment & Plan  Difficulty overnight  Start melatonin  Primary team has also started valium for anxiety  Diabetic ulcer of right 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, with fat layer exposed [facility])  Assessment & Plan  Lab Results   Component Value Date    HGBA1C 7 9 (H) 03/17/2021       Recent Labs     04/13/21  0211 04/13/21  0251 04/13/21  0538 04/13/21  0701   POCGLU 232* 207* 150* 109       Blood Sugar Average: Last 72 hrs:  (P) 449 [id number]     Plan    Transition patient from drip to Lantus 15 U QD and Lispro 5 TID with sliding scale  Continue to monitor      Ambulatory dysfunction  Assessment & Plan  Stable  PT/OT      Essential hypertension  Assessment & Plan  Stble  -continue home lisinopril 40 mg daily  -continue diltiazem 180 mg b i d      Atrial fibrillation with RVR ([facility] 75 )  Assessment & Plan  patient was scheduled to undergo elective right foot debridement and free flap reconstruction however this procedure was aborted due to AFib with RVR and hypertension during induction  Plan  Stable, rates controlled  -cardiology is following  -Pt on diltiazem 180 mg b i d , was not able to tolerate PO meds and was on Afib w RVR 21  Started on cardizem gtt by cards   -metoprolo 50 mg q12  -home Eliquis 5 mg b i d  Currently on hold  -continue telemetry monitoring    Type 2 diabetes mellitus with diabetic neuropathy [facility])  Assessment & Plan  Lab Results   Component Value Date    HGBA1C 7 9 (H) 2021       Recent Labs     21  0211 21  0251 21  0538 21  0701   POCGLU 232* 207* 150* 109       Blood Sugar Average: Last 72 hrs:  (P) 289 [id number]     Plan  -Transition patient to Lantus 12 U QD, Lispro 5 U TID, ISS  -continue to monitor blood glucose     Hyperlipidemia  Assessment & Plan  Plan   Stable  -continue home Lipitor 40 mg daily    * S/P transmetatarsal amputation of foot, right (HCC)  Assessment & Plan  Stable  S/p revision with plastic surgery  Antibiotic management per ID  Plastic surgery primary      Disposition: Internal medicine is consulted  Primary management per plastic surgery  SUBJECTIVE     Patient seen and examined  Overnight patient had difficulty sleeping  Denies any chest pain, SOB, N/V  Tolerating PO  Wound managed by plastic surgery  OBJECTIVE     Vitals:    21 2315 21 0742 21 0837 21 1050   BP: 136/75 164/91 (!) 179/81 130/83   BP Location: Right arm Right arm  Right arm   Pulse: 85 (!) 111 (!) 132 89   Resp: 18 18  18   Temp: 98 9 °F (37 2 °C) 98 2 °F (36 8 °C)     TempSrc: Oral Oral     SpO2: 97% 97%     Weight:       Height:          Temperature:   Temp (24hrs), Av 3 °F (36 8 °C), Min:97 3 °F (36 3 °C), Max:98 9 °F (37 2 °C)    Temperature: 98 2 °F (36 8 °C)  Intake & Output:  I/O       701 -  0700  07 -  0700  07 - 04/15 0700    P  O  0 570     I V  (mL/kg) 4465 8 (41 4) 3543 8 (32 8)     IV Piggyback 1000 250     Total Intake(mL/kg) 5465 8 (50 6) 4363 8 (40 4)     Urine (mL/kg/hr) 3500 (1 4) 2850 (1 1)     Drains 10 25     Blood 150 75     Total Output 3660 2950 Net +1805 8 +1413 8                Weights:   IBW (Ideal Body Weight): 89 1 kg    Body mass index is 28 11 kg/m²  Weight (last 2 days)     None        Physical Exam  Constitutional:       General: He is not in acute distress  Appearance: Normal appearance  He is not ill-appearing  HENT:      Head: Normocephalic and atraumatic  Mouth/Throat:      Mouth: Mucous membranes are moist    Eyes:      Extraocular Movements: Extraocular movements intact  Conjunctiva/sclera: Conjunctivae normal       Pupils: Pupils are equal, round, and reactive to light  Cardiovascular:      Rate and Rhythm: Normal rate  Rhythm irregular  Pulses: Normal pulses  Heart sounds: Normal heart sounds  No murmur  No friction rub  No gallop  Pulmonary:      Effort: Pulmonary effort is normal  No respiratory distress  Breath sounds: Normal breath sounds  No wheezing or rales  Abdominal:      General: Abdomen is flat  Bowel sounds are normal  There is no distension  Palpations: Abdomen is soft  Tenderness: There is no abdominal tenderness  Musculoskeletal:         General: Deformity present  Comments: Right foot wound present, with drain in place  Serosangiunous output  Skin:     General: Skin is warm and dry  Comments: Right foot wound clean and intact   Neurological:      General: No focal deficit present  Mental Status: He is alert and oriented to person, place, and time  Psychiatric:         Mood and Affect: Mood is anxious  LABORATORY DATA     Labs: I have personally reviewed pertinent reports    Results from last 7 days   Lab Units 04/14/21  0630 04/13/21  1412 04/13/21  0555   WBC Thousand/uL 12 84* 14 27* 11 37*   HEMOGLOBIN g/dL 9 6* 9 2* 9 7*   HEMATOCRIT % 30 4* 29 6* 30 5*   PLATELETS Thousands/uL 210 218 221      Results from last 7 days   Lab Units 04/14/21  0449 04/13/21  1412 04/13/21  0555 04/13/21  0033 04/12/21  2324  04/12/21  1449  04/12/21  0008 POTASSIUM mmol/L 4 1 4 2 3 9  --   --    < >  --   --  3 9   CHLORIDE mmol/L 115* 116* 111*  --   --    < >  --   --  110*   CO2 mmol/L 23 23 24  --   --    < >  --   --  24   CO2, I-STAT mmol/L  --   --   --  23 23  --  24   < >  --    BUN mg/dL 9 11 11  --   --    < >  --   --  16   CREATININE mg/dL 0 81 0 94 0 84  --   --    < >  --   --  0 98   CALCIUM mg/dL 8 2* 8 1* 8 4  --   --    < >  --   --  9 0   ALK PHOS U/L  --   --   --   --   --   --   --   --  111   ALT U/L  --   --   --   --   --   --   --   --  59   AST U/L  --   --   --   --   --   --   --   --  21   GLUCOSE, ISTAT mg/dl  --   --   --  282* 317*  --  184*   < >  --     < > = values in this interval not displayed  Results from last 7 days   Lab Units 04/13/21 1412 04/13/21  0555 04/12/21 1947   MAGNESIUM mg/dL 2 2 1 9 2 0     Results from last 7 days   Lab Units 04/13/21  1412 04/13/21  0555 04/12/21  1947   PHOSPHORUS mg/dL 2 7 1 9* 2 5      Results from last 7 days   Lab Units 04/14/21  0330 04/13/21 2034 04/13/21 1412 04/12/21 1947   INR   --   --  1 32* 1 25*   PTT seconds 59* 36 30 34               IMAGING & DIAGNOSTIC TESTING     Radiology Results: I have personally reviewed pertinent reports  No results found  Other Diagnostic Testing: I have personally reviewed pertinent reports      ACTIVE MEDICATIONS     Current Facility-Administered Medications   Medication Dose Route Frequency    acetaminophen (TYLENOL) tablet 650 mg  650 mg Oral Q4H PRN    aspirin tablet 325 mg  325 mg Oral Daily    atorvastatin (LIPITOR) tablet 40 mg  40 mg Oral Daily With Dinner    ceFAZolin (ANCEF) IVPB (premix in dextrose) 2,000 mg 50 mL  2,000 mg Intravenous Once    dextrose 5 % and sodium chloride 0 45 % with KCl 20 mEq/L infusion  125 mL/hr Intravenous Continuous    diazepam (VALIUM) tablet 5 mg  5 mg Oral Q6H PRN    diltiazem (CARDIZEM) tablet 180 mg  180 mg Oral BID    docusate sodium (COLACE) capsule 100 mg  100 mg Oral BID    doxycycline hyclate (VIBRAMYCIN) capsule 100 mg  100 mg Oral Q12H [facility]    heparin (porcine) 25,000 units in 0 45% NaCl 250 mL infusion (premix)  3-20 Units/kg/hr (Order-Specific) Intravenous Titrated    heparin (porcine) injection 2,000 Units  2,000 Units Intravenous Q1H PRN    heparin (porcine) injection 4,000 Units  4,000 Units Intravenous Q1H PRN    heparin 1000 units in 500 mL infusion (premix) for sheath patency  20 Units/hr Intravenous Continuous    HYDROmorphone (DILAUDID) injection 0 5 mg  0 5 mg Intravenous Q2H PRN    hydrOXYzine HCL (ATARAX) tablet 25 mg  25 mg Oral Once    insulin glargine (LANTUS) subcutaneous injection 15 Units 0 15 mL  15 Units Subcutaneous QAM    insulin lispro (HumaLOG) 100 units/mL subcutaneous injection 1-6 Units  1-6 Units Subcutaneous TID AC    insulin lispro (HumaLOG) 100 units/mL subcutaneous injection 5 Units  5 Units Subcutaneous TID With Meals    lisinopril (ZESTRIL) tablet 40 mg  40 mg Oral Daily    melatonin tablet 3 mg  3 mg Oral HS    metoprolol succinate (TOPROL-XL) 24 hr tablet 75 mg  75 mg Oral BID    ondansetron (ZOFRAN) injection 4 mg  4 mg Intravenous Q6H PRN    oxyCODONE (ROXICODONE) IR tablet 10 mg  10 mg Oral Q4H PRN    oxyCODONE (ROXICODONE) IR tablet 5 mg  5 mg Oral Q4H PRN    senna-docusate sodium (SENOKOT S) 8 6-50 mg per tablet 1 tablet  1 tablet Oral HS       VTE Pharmacologic Prophylaxis: Heparin  VTE Mechanical Prophylaxis: sequential compression device    Portions of the record may have been created with voice recognition software  Occasional wrong word or "sound a like" substitutions may have occurred due to the inherent limitations of voice recognition software    Read the chart carefully and recognize, using context, where substitutions have occurred   ==  Jaime Ornelas, [facility]  Internal Medicine Residency PGY-2

## 2021-04-15 LAB
ANION GAP SERPL CALCULATED.3IONS-SCNC: 7 MMOL/L (ref 4–13)
APTT PPP: 64 SECONDS (ref 23–37)
APTT PPP: 75 SECONDS (ref 23–37)
BUN SERPL-MCNC: 12 MG/DL (ref 5–25)
CALCIUM SERPL-MCNC: 8.4 MG/DL (ref 8.3–10.1)
CHLORIDE SERPL-SCNC: 108 MMOL/L (ref 100–108)
CO2 SERPL-SCNC: 24 MMOL/L (ref 21–32)
CREAT SERPL-MCNC: 0.82 MG/DL (ref 0.6–1.3)
ERYTHROCYTE [DISTWIDTH] IN BLOOD BY AUTOMATED COUNT: 15.8 % (ref 11.6–15.1)
GFR SERPL CREATININE-BSD FRML MDRD: 90 ML/MIN/1.73SQ M
GLUCOSE SERPL-MCNC: 140 MG/DL (ref 65–140)
GLUCOSE SERPL-MCNC: 155 MG/DL (ref 65–140)
GLUCOSE SERPL-MCNC: 157 MG/DL (ref 65–140)
GLUCOSE SERPL-MCNC: 186 MG/DL (ref 65–140)
GLUCOSE SERPL-MCNC: 192 MG/DL (ref 65–140)
HCT VFR BLD AUTO: 32.6 % (ref 36.5–49.3)
HGB BLD-MCNC: 10.3 G/DL (ref 12–17)
MCH RBC QN AUTO: 30.1 PG (ref 26.8–34.3)
MCHC RBC AUTO-ENTMCNC: 31.6 G/DL (ref 31.4–37.4)
MCV RBC AUTO: 95 FL (ref 82–98)
PLATELET # BLD AUTO: 227 THOUSANDS/UL (ref 149–390)
PMV BLD AUTO: 11.4 FL (ref 8.9–12.7)
POTASSIUM SERPL-SCNC: 3.8 MMOL/L (ref 3.5–5.3)
RBC # BLD AUTO: 3.42 MILLION/UL (ref 3.88–5.62)
SODIUM SERPL-SCNC: 139 MMOL/L (ref 136–145)
WBC # BLD AUTO: 12.54 THOUSAND/UL (ref 4.31–10.16)

## 2021-04-15 PROCEDURE — 99223 1ST HOSP IP/OBS HIGH 75: CPT | Performed by: INTERNAL MEDICINE

## 2021-04-15 PROCEDURE — 80048 BASIC METABOLIC PNL TOTAL CA: CPT | Performed by: STUDENT IN AN ORGANIZED HEALTH CARE EDUCATION/TRAINING PROGRAM

## 2021-04-15 PROCEDURE — NC001 PR NO CHARGE: Performed by: PLASTIC SURGERY

## 2021-04-15 PROCEDURE — 85730 THROMBOPLASTIN TIME PARTIAL: CPT | Performed by: PLASTIC SURGERY

## 2021-04-15 PROCEDURE — 82948 REAGENT STRIP/BLOOD GLUCOSE: CPT

## 2021-04-15 PROCEDURE — 99232 SBSQ HOSP IP/OBS MODERATE 35: CPT | Performed by: INTERNAL MEDICINE

## 2021-04-15 PROCEDURE — 85027 COMPLETE CBC AUTOMATED: CPT | Performed by: SURGERY

## 2021-04-15 RX ORDER — GABAPENTIN 100 MG/1
100 CAPSULE ORAL DAILY
Status: DISCONTINUED | OUTPATIENT
Start: 2021-04-15 | End: 2021-04-30 | Stop reason: HOSPADM

## 2021-04-15 RX ORDER — QUETIAPINE FUMARATE 25 MG/1
12.5 TABLET, FILM COATED ORAL ONCE
Status: COMPLETED | OUTPATIENT
Start: 2021-04-15 | End: 2021-04-15

## 2021-04-15 RX ORDER — METOPROLOL TARTRATE 5 MG/5ML
5 INJECTION INTRAVENOUS ONCE
Status: COMPLETED | OUTPATIENT
Start: 2021-04-15 | End: 2021-04-15

## 2021-04-15 RX ORDER — QUETIAPINE FUMARATE 25 MG/1
12.5 TABLET, FILM COATED ORAL 2 TIMES DAILY
Status: DISCONTINUED | OUTPATIENT
Start: 2021-04-15 | End: 2021-04-16

## 2021-04-15 RX ORDER — QUETIAPINE FUMARATE 25 MG/1
25 TABLET, FILM COATED ORAL ONCE
Status: DISCONTINUED | OUTPATIENT
Start: 2021-04-15 | End: 2021-04-15

## 2021-04-15 RX ADMIN — METOROPROLOL TARTRATE 5 MG: 5 INJECTION, SOLUTION INTRAVENOUS at 06:29

## 2021-04-15 RX ADMIN — LISINOPRIL 40 MG: 20 TABLET ORAL at 08:02

## 2021-04-15 RX ADMIN — DOCUSATE SODIUM AND SENNOSIDES 1 TABLET: 8.6; 5 TABLET ORAL at 21:12

## 2021-04-15 RX ADMIN — INSULIN LISPRO 5 UNITS: 100 INJECTION, SOLUTION INTRAVENOUS; SUBCUTANEOUS at 16:35

## 2021-04-15 RX ADMIN — INSULIN LISPRO 5 UNITS: 100 INJECTION, SOLUTION INTRAVENOUS; SUBCUTANEOUS at 08:03

## 2021-04-15 RX ADMIN — INSULIN LISPRO 2 UNITS: 100 INJECTION, SOLUTION INTRAVENOUS; SUBCUTANEOUS at 08:03

## 2021-04-15 RX ADMIN — DIAZEPAM 5 MG: 5 TABLET ORAL at 08:50

## 2021-04-15 RX ADMIN — DOCUSATE SODIUM 100 MG: 100 CAPSULE, LIQUID FILLED ORAL at 08:02

## 2021-04-15 RX ADMIN — QUETIAPINE FUMARATE 12.5 MG: 25 TABLET ORAL at 23:07

## 2021-04-15 RX ADMIN — APIXABAN 5 MG: 5 TABLET, FILM COATED ORAL at 18:18

## 2021-04-15 RX ADMIN — METOPROLOL SUCCINATE 75 MG: 50 TABLET, EXTENDED RELEASE ORAL at 21:12

## 2021-04-15 RX ADMIN — ASPIRIN 325 MG ORAL TABLET 325 MG: 325 PILL ORAL at 08:02

## 2021-04-15 RX ADMIN — MELATONIN TAB 3 MG 3 MG: 3 TAB at 21:12

## 2021-04-15 RX ADMIN — METOPROLOL SUCCINATE 75 MG: 50 TABLET, EXTENDED RELEASE ORAL at 08:01

## 2021-04-15 RX ADMIN — DOXYCYCLINE 100 MG: 100 CAPSULE ORAL at 21:11

## 2021-04-15 RX ADMIN — DILTIAZEM HYDROCHLORIDE 180 MG: 60 TABLET, FILM COATED ORAL at 16:34

## 2021-04-15 RX ADMIN — HEPARIN SODIUM 19 UNITS/KG/HR: 10000 INJECTION, SOLUTION INTRAVENOUS at 04:44

## 2021-04-15 RX ADMIN — DILTIAZEM HYDROCHLORIDE 180 MG: 60 TABLET, FILM COATED ORAL at 08:01

## 2021-04-15 RX ADMIN — INSULIN LISPRO 1 UNITS: 100 INJECTION, SOLUTION INTRAVENOUS; SUBCUTANEOUS at 16:35

## 2021-04-15 RX ADMIN — OXYCODONE HYDROCHLORIDE 5 MG: 5 TABLET ORAL at 23:30

## 2021-04-15 RX ADMIN — DOXYCYCLINE 100 MG: 100 CAPSULE ORAL at 08:01

## 2021-04-15 RX ADMIN — QUETIAPINE FUMARATE 12.5 MG: 25 TABLET ORAL at 08:50

## 2021-04-15 RX ADMIN — DOCUSATE SODIUM 100 MG: 100 CAPSULE, LIQUID FILLED ORAL at 16:34

## 2021-04-15 RX ADMIN — INSULIN GLARGINE 15 UNITS: 100 INJECTION, SOLUTION SUBCUTANEOUS at 08:50

## 2021-04-15 RX ADMIN — ATORVASTATIN CALCIUM 40 MG: 40 TABLET, FILM COATED ORAL at 16:34

## 2021-04-15 RX ADMIN — GABAPENTIN 100 MG: 100 CAPSULE ORAL at 08:50

## 2021-04-15 NOTE — PROGRESS NOTES
Progress Note - Infectious Disease   Kary Vaca  73 y o  male MRN: [id number]  Unit/Bed#: [facility] 525-01 Encounter: [id number]      Impression/Recommendations:  1  Possible residual osteomyelitis of right foot   Patient is status post screw fixation right foot on , with operative bone scraping culture growing Enterobacter   Patient has been on p o  Doxycycline since and is tolerating it well  [facility], he had a persistent open foot wound   Patient is now status post flap closure of right foot   With successful flap closure, there is no further bone exposed   The best course of action at this point is to have patient continue antibiotic for 6 weeks after flap closure  Continue p o  Doxycycline  Treat x6 weeks after flap closure, through       2  Open right foot wound   Patient is status post flap closure yesterday, requiring return to OR twice for revision due to congestion  Wound is clean, without evidence of cellulitis  Wound care per Plastic surgery      3  Status post screw fixation right foot on       4  Status post failed right foot reconstruction, requiring TMA   All hardware prior to [facility] were removed  [place], as in above, patient is status post screw fixation of right foot after TMA      5  DM, somewhat poorly controlled, with elevated hemoglobin A1c  Management per Medicine Service      Discussed with patient and his wife in detail regarding the above plan        Antibiotics:  Doxycycline  POD # 3      Subjective:  Patient is much more awake and alert  He still has confusion  Minimal pain in right foot  Temperature stays down  No chills  He is tolerating antibiotic well    No nausea, vomiting or diarrhea      Objective:  Vitals:  Temp:  [97 8 °F (36 6 °C)-98 7 °F (37 1 °C)] 98 1 °F (36 7 °C)  HR:  [] 75  Resp:  [18-20] 18  BP: (125-195)/(60-97) 130/60  SpO2:  [97 %-100 %] 100 %  Temp (24hrs), Av 1 °F (36 7 °C), Min:97 8 °F (36 6 °C), Max:98 7 °F (37 1 °C)  Current: Temperature: 98 1 °F (36 7 °C)    Physical Exam:     General: Awake, alert, cooperative, no distress  Some confusion  Neck:  Supple  No mass  No lymphadenopathy  Lungs: Expansion symmetric, no rales, no wheezing, respirations unlabored  Heart:  Regular rate and rhythm, S1 and S2 normal, no murmur  Abdomen: Soft, nondistended, non-tender, bowel sounds active all four quadrants, no masses, no organomegaly  Extremities: Stable foot/leg edema  Flap incision clean, without purulence  No erythema/warmth  Nontender  Skin:  No rash  Neuro: Moves all extremities  Invasive Devices     Peripheral Intravenous Line            Peripheral IV 04/11/21 Distal;Upper;Left Arm 3 days    Peripheral IV 04/12/21 Right Forearm 3 days          Drain            Closed/Suction Drain Right Thigh Bulb 15 Fr  2 days                Labs studies:   I have personally reviewed pertinent labs  Results from last 7 days   Lab Units 04/15/21  0809 04/14/21  0449 04/13/21  1412  04/13/21  0033 04/12/21  2324  04/12/21  1449  04/12/21  0008   POTASSIUM mmol/L 3 8 4 1 4 2   < >  --   --    < >  --   --  3 9   CHLORIDE mmol/L 108 115* 116*   < >  --   --    < >  --   --  110*   CO2 mmol/L 24 23 23   < >  --   --    < >  --   --  24   CO2, I-STAT mmol/L  --   --   --   --  23 23  --  24   < >  --    BUN mg/dL 12 9 11   < >  --   --    < >  --   --  16   CREATININE mg/dL 0 82 0 81 0 94   < >  --   --    < >  --   --  0 98   EGFR ml/min/1 73sq m 90 91 82   < >  --   --    < >  --   --  78   GLUCOSE, ISTAT mg/dl  --   --   --   --  282* 317*  --  184*   < >  --    CALCIUM mg/dL 8 4 8 2* 8 1*   < >  --   --    < >  --   --  9 0   AST U/L  --   --   --   --   --   --   --   --   --  21   ALT U/L  --   --   --   --   --   --   --   --   --  59   ALK PHOS U/L  --   --   --   --   --   --   --   --   --  111    < > = values in this interval not displayed       Results from last 7 days   Lab Units 04/15/21  0448 04/14/21  0630 04/13/21  4513 WBC Thousand/uL 12 54* 12 84* 14 27*   HEMOGLOBIN g/dL 10 3* 9 6* 9 2*   PLATELETS Thousands/uL 227 210 218           Imaging Studies:   I have personally reviewed pertinent imaging study reports and images in PACS  EKG, Pathology, and Other Studies:   I have personally reviewed pertinent reports

## 2021-04-15 NOTE — PROGRESS NOTES
INTERNAL MEDICINE RESIDENCY PROGRESS NOTE     Name: Franchesca Moreno  Age & Sex: 73 y o  male   MRN: [id number]  Unit/Bed#: [facility] 525-01   Encounter: [id number]  Team: SOD Team B     PATIENT INFORMATION     Name: Franchesca Moreno  Age & Sex: 73 y o  male   MRN: [id number]  Hospital Stay Days: 8    ASSESSMENT/PLAN     Principal Problem:    S/P transmetatarsal amputation of foot, right ([place] [address] )  Active Problems:    Hyperlipidemia    Type 2 diabetes mellitus with diabetic neuropathy (HCC)    Atrial fibrillation with RVR ([facility] 75 )    Essential hypertension    Acquired absence of other right toe(s) ([place] [address] )    Deep disruption or dehiscence of operation wound    Ambulatory dysfunction    Diabetic ulcer of right 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, with fat layer exposed ([place] [address] )    Sleep disturbance  Sleep disturbance  Assessment & Plan  Difficulty overnight  Continue melatonin  Primary team has also started valium for anxiety  Diabetic ulcer of right 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, with fat layer exposed [facility])  Assessment & Plan  Lab Results   Component Value Date    HGBA1C 7 9 (H) 03/17/2021       Recent Labs     04/14/21  1557 04/14/21  2102 04/15/21  0624 04/15/21  1100   POCGLU 128 135 192* 155*       Blood Sugar Average: Last 72 hrs:  (P) 176     Plan    Continue Lantus 15 U QD and Lispro 5 TID with sliding scale  Continue to monitor      Ambulatory dysfunction  Assessment & Plan  Stable  PT/OT      Essential hypertension  Assessment & Plan  Stble  -continue home lisinopril 40 mg daily  -continue diltiazem 180 mg b i d  Atrial fibrillation with RVR ([facility] 75 )  Assessment & Plan  Echo 2018 - EF 50%, no regional wall abnormalities, mildly dilated LV, RA  Prior EKGs reviewed - chronic afib   Pt reported to be in Afib with RVR last night and was given x1 metoprolol   Plan  Stable, rates controlled  -cardiology is following  -Pt on diltiazem 180 mg b i d , was not able to tolerate PO meds and was on Afib w RVR 21  Started on cardizem gtt by cards now back on PO meds  -metoprolol 50 mg q12  -home Eliquis 5 mg b i d    -continue telemetry monitoring    Hyperlipidemia  Assessment & Plan  Plan   Stable  -continue home Lipitor 40 mg daily    * S/P transmetatarsal amputation of foot, right (HCC)  Assessment & Plan  Stable  S/p revision with plastic surgery  Antibiotic management per ID  Plastic surgery primary    Disposition: will continue to follow patient    SUBJECTIVE     Patient seen and examined, was delirious overnight hearing things, he was given melatonin and Valium, per report, he refused Seroquel  Geriatrics was consulted overnight  He was also in  AFib with RVR with heart rate in the 140s patient was given x1 time dose 5 mg Lopressor  Patient is delirious this morning with his wife by his bedside  OBJECTIVE     Vitals:    04/15/21 0701 04/15/21 0801 04/15/21 0929 04/15/21 1100   BP: 145/90 (!) 195/89 125/72 130/60   BP Location: Right arm  Right arm Right arm   Pulse: (!) 118 (!) 132 87 75   Resp: 18  18 18   Temp: 98 1 °F (36 7 °C)      TempSrc: Oral      SpO2: 98%   100%   Weight:       Height:          Temperature:   Temp (24hrs), Av 1 °F (36 7 °C), Min:97 8 °F (36 6 °C), Max:98 7 °F (37 1 °C)    Temperature: 98 1 °F (36 7 °C)  Intake & Output:  I/O        07 -  0700  07 - 04/15 0700 04/15 07 -  0700    P  O  570 360 240    I V  (mL/kg) 3543 8 (32 8) 916 8 (8 5) 72 7 (0 7)    IV Piggyback 250      Total Intake(mL/kg) 4363 8 (40 4) 1276 8 (11 8) 312 7 (2 9)    Urine (mL/kg/hr) 2850 (1 1) 4625 (1 8) 1100 (1 4)    Drains 25 30     Blood 75      Total Output 2950 4655 1100    Net +1413 8 -3378 2 -787  3               Weights:   IBW (Ideal Body Weight): 89 1 kg    Body mass index is 28 11 kg/m²  Weight (last 2 days)     None        Physical Exam   Constitutional:       General: He is not in acute distress  Appearance: Normal appearance  He is not ill-appearing  HENT:      Head: Normocephalic and atraumatic  Mouth/Throat:      Mouth: Mucous membranes are moist    Eyes:      Extraocular Movements: Extraocular movements intact  Conjunctiva/sclera: Conjunctivae normal       Pupils: Pupils are equal, round, and reactive to light  Cardiovascular:      Rate and Rhythm: Normal rate  Rhythm irregular  Pulses: Normal pulses  Heart sounds: Normal heart sounds  No murmur  No friction rub  No gallop  Pulmonary:      Effort: Pulmonary effort is normal  No respiratory distress  Breath sounds: Normal breath sounds  No wheezing or rales  Abdominal:      General: Abdomen is flat  Bowel sounds are normal  There is no distension  Palpations: Abdomen is soft  Tenderness: There is no abdominal tenderness  Musculoskeletal:         General: Deformity present  Comments: Right foot wound present, with drain in place  Serosangiunous output  Skin:     General: Skin is warm and dry  Comments: Right foot wound clean and intact   Neurological:      General: No focal deficit present  Mental Status: He is alert and oriented to person, place, and time  Psychiatric:         Mood and Affect: Mood is confused     LABORATORY DATA     Labs: I have personally reviewed pertinent reports    Results from last 7 days   Lab Units 04/15/21  0448 04/14/21  0630 04/13/21  1412   WBC Thousand/uL 12 54* 12 84* 14 27*   HEMOGLOBIN g/dL 10 3* 9 6* 9 2*   HEMATOCRIT % 32 6* 30 4* 29 6*   PLATELETS Thousands/uL 227 210 218      Results from last 7 days   Lab Units 04/15/21  0809 04/14/21  0449 04/13/21  1412  04/13/21  0033 04/12/21  2324  04/12/21  1449  04/12/21  0008   POTASSIUM mmol/L 3 8 4 1 4 2   < >  --   --    < >  --   --  3 9   CHLORIDE mmol/L 108 115* 116*   < >  --   --    < >  --   --  110*   CO2 mmol/L 24 23 23   < >  --   --    < >  --   --  24   CO2, I-STAT mmol/L  --   --   --   --  23 23  --  24   < >  --    BUN mg/dL 12 9 11   < >  --   --    < >  --   --  16   CREATININE mg/dL 0 82 0 81 0 94   < >  --   --    < >  --   --  0 98   CALCIUM mg/dL 8 4 8 2* 8 1*   < >  --   --    < >  --   --  9 0   ALK PHOS U/L  --   --   --   --   --   --   --   --   --  111   ALT U/L  --   --   --   --   --   --   --   --   --  59   AST U/L  --   --   --   --   --   --   --   --   --  21   GLUCOSE, ISTAT mg/dl  --   --   --   --  282* 317*  --  184*   < >  --     < > = values in this interval not displayed  Results from last 7 days   Lab Units 04/13/21  1412 04/13/21  0555 04/12/21  1947   MAGNESIUM mg/dL 2 2 1 9 2 0     Results from last 7 days   Lab Units 04/13/21  1412 04/13/21  0555 04/12/21  1947   PHOSPHORUS mg/dL 2 7 1 9* 2 5      Results from last 7 days   Lab Units 04/15/21  0448 04/14/21  2329 04/14/21  1717  04/13/21  1412 04/12/21  1947   INR   --   --   --   --  1 32* 1 25*   PTT seconds 64* 75* 76*   < > 30 34    < > = values in this interval not displayed  IMAGING & DIAGNOSTIC TESTING     Radiology Results: I have personally reviewed pertinent reports  No results found  Other Diagnostic Testing: I have personally reviewed pertinent reports      ACTIVE MEDICATIONS     Current Facility-Administered Medications   Medication Dose Route Frequency    acetaminophen (TYLENOL) tablet 650 mg  650 mg Oral Q4H PRN    apixaban (ELIQUIS) tablet 5 mg  5 mg Oral BID    aspirin tablet 325 mg  325 mg Oral Daily    atorvastatin (LIPITOR) tablet 40 mg  40 mg Oral Daily With Dinner    diazepam (VALIUM) tablet 5 mg  5 mg Oral Q6H PRN    diltiazem (CARDIZEM) tablet 180 mg  180 mg Oral BID    docusate sodium (COLACE) capsule 100 mg  100 mg Oral BID    doxycycline hyclate (VIBRAMYCIN) capsule 100 mg  100 mg Oral Q12H [facility]    gabapentin (NEURONTIN) capsule 100 mg  100 mg Oral Daily    HYDROmorphone (DILAUDID) injection 0 5 mg  0 5 mg Intravenous Q2H PRN    insulin glargine (LANTUS) subcutaneous injection 15 Units 0 15 mL  15 Units Subcutaneous QAM    insulin lispro (HumaLOG) 100 units/mL subcutaneous injection 1-6 Units  1-6 Units Subcutaneous TID AC    insulin lispro (HumaLOG) 100 units/mL subcutaneous injection 5 Units  5 Units Subcutaneous TID With Meals    lisinopril (ZESTRIL) tablet 40 mg  40 mg Oral Daily    melatonin tablet 3 mg  3 mg Oral HS    metoprolol succinate (TOPROL-XL) 24 hr tablet 75 mg  75 mg Oral BID    ondansetron (ZOFRAN) injection 4 mg  4 mg Intravenous Q6H PRN    oxyCODONE (ROXICODONE) IR tablet 10 mg  10 mg Oral Q4H PRN    oxyCODONE (ROXICODONE) IR tablet 5 mg  5 mg Oral Q4H PRN    senna-docusate sodium (SENOKOT S) 8 6-50 mg per tablet 1 tablet  1 tablet Oral HS       VTE Pharmacologic Prophylaxis: Eliquis      Portions of the record may have been created with voice recognition software  Occasional wrong word or "sound a like" substitutions may have occurred due to the inherent limitations of voice recognition software    Read the chart carefully and recognize, using context, where substitutions have occurred   ==  Andrea Mcelroy, [facility]  Internal Medicine Residency PGY-2

## 2021-04-15 NOTE — ANESTHESIA POSTPROCEDURE EVALUATION
Post-Op Assessment Note    CV Status:  Stable    Pain management: adequate     Mental Status:  Alert and awake   Hydration Status:  Euvolemic   PONV Controlled:  Controlled   Airway Patency:  Patent      Post Op Vitals Reviewed: Yes      Staff: Anesthesiologist, CRNA   Comments: AVSS      No complications documented

## 2021-04-15 NOTE — PROGRESS NOTES
Progress Note - Plastic Surgery   [place]  73 y o  male MRN: [id number]  Unit/Bed#: [facility] 525-01 Encounter: [id number]    Assessment:  Principal Problem:    S/P transmetatarsal amputation of foot, right (HCC)  Active Problems:    Atrial fibrillation with RVR ([facility] )    Hyperlipidemia    Type 2 diabetes mellitus with diabetic neuropathy ([place] [address] )    Essential hypertension    Acquired absence of other right toe(s) ([place] [address] )    Deep disruption or dehiscence of operation wound    Ambulatory dysfunction    Diabetic ulcer of right 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, with fat layer exposed ([place] [address] )    Sleep disturbance    73 y o  male POD 3 from ALT reconstruction of R foot defect, subsequent takebacks on POD 0 and 1 for venous congestion  Overall doing well  Worsening delirium  Clinically stable   VSS, A fib rate controlled  R foot free flap doing well    Plan:  - Consulted inpatient Gerontology for assistance with management of delirium   - Change to Q2 hr flap checks, strict bedrest, elevation of RLE, hamilton hugger  DC Astorga catheter, A line  - DC heparin drip, start Eliquis  - Cont regular diet, off IVF  - Appreciate assistance of Cards, ID and IM  - Discussed with Dr Jose Rasmussen MD  Resident, Plastic & Reconstructive Surgery  [phone] (c)     Subjective/Objective   Chief Complaint: R foot wound    Subjective: resting in bed  Pain controlled  Worsening delirium, states that he believes staff are going to electrocute him  Objective:     Blood pressure (!) 195/89, pulse (!) 132, temperature 97 8 °F (36 6 °C), temperature source Oral, resp  rate 18, height 6' 5" (1 956 m), weight 108 kg (237 lb 1 3 oz), SpO2 99 %  ,Body mass index is 28 11 kg/m²        Intake/Output Summary (Last 24 hours) at 4/15/2021 0819  Last data filed at 4/15/2021 0701  Gross per 24 hour   Intake 1276 81 ml   Output 5355 ml   Net -4078 19 ml       Invasive Devices     Peripheral Intravenous Line Peripheral IV 04/11/21 Distal;Upper;Left Arm 3 days    Peripheral IV 04/12/21 Right Forearm 2 days          Arterial Line            Arterial Line Left Radial -- days          Drain            Closed/Suction Drain Right Thigh Bulb 15 Fr  2 days    Urethral Catheter Temperature probe 16 Fr  2 days                Physical Exam:   Gen: NAD  Neuro: Alert, oriented  CV: regular rate  Pulm: no respiratory distress  Abd: nondistended  GI: Astorga catheter  Ext: RLE free flap is pink, soft, warm  Cap refill is about 2 seconds  Micro flow  with strong venous doppler signal via A, no signal via B  Vioptix with perfusion in the low 50% range, signal quality > 90%  Arterial and venous signals at both marking sutures on the flap and elsewhere  Some serosang ooze from flap edges     Lab, Imaging and other studies:I have personally reviewed pertinent lab results      VTE Pharmacologic Prophylaxis: Sequential compression device (Venodyne), heparin drip, switching to Eliquis today  VTE Mechanical Prophylaxis: sequential compression device

## 2021-04-15 NOTE — PLAN OF CARE
Problem: Potential for Falls  Goal: Patient will remain free of falls  Description: INTERVENTIONS:  - Assess patient frequently for physical needs  -  Identify cognitive and physical deficits and behaviors that affect risk of falls    -  [place] fall precautions as indicated by assessment   - Educate patient/family on patient safety including physical limitations  - Instruct patient to call for assistance with activity based on assessment  - Modify environment to reduce risk of injury  - Consider OT/PT consult to assist with strengthening/mobility  Outcome: Progressing     Problem: PAIN - ADULT  Goal: Verbalizes/displays adequate comfort level or baseline comfort level  Description: Interventions:  - Encourage patient to monitor pain and request assistance  - Assess pain using appropriate pain scale  - Administer analgesics based on type and severity of pain and evaluate response  - Implement non-pharmacological measures as appropriate and evaluate response  - Consider cultural and social influences on pain and pain management  - Notify physician/advanced practitioner if interventions unsuccessful or patient reports new pain  Outcome: Progressing     Problem: INFECTION - ADULT  Goal: Absence or prevention of progression during hospitalization  Description: INTERVENTIONS:  - Assess and monitor for signs and symptoms of infection  - Monitor lab/diagnostic results  - Monitor all insertion sites, i e  indwelling lines, tubes, and drains  - Monitor endotracheal if appropriate and nasal secretions for changes in amount and color  - [place] appropriate cooling/warming therapies per order  - Administer medications as ordered  - Instruct and encourage patient and family to use good hand hygiene technique  - Identify and instruct in appropriate isolation precautions for identified infection/condition  Outcome: Progressing  Goal: Absence of fever/infection during neutropenic period  Description: INTERVENTIONS:  - Monitor WBC    Outcome: Progressing     Problem: SAFETY ADULT  Goal: Patient will remain free of falls  Description: INTERVENTIONS:  - Assess patient frequently for physical needs  -  Identify cognitive and physical deficits and behaviors that affect risk of falls    -  [place] fall precautions as indicated by assessment   - Educate patient/family on patient safety including physical limitations  - Instruct patient to call for assistance with activity based on assessment  - Modify environment to reduce risk of injury  - Consider OT/PT consult to assist with strengthening/mobility  Outcome: Progressing  Goal: Maintain or return to baseline ADL function  Description: INTERVENTIONS:  -  Assess patient's ability to carry out ADLs; assess patient's baseline for ADL function and identify physical deficits which impact ability to perform ADLs (bathing, care of mouth/teeth, toileting, grooming, dressing, etc )  - Assess/evaluate cause of self-care deficits   - Assess range of motion  - Assess patient's mobility; develop plan if impaired  - Assess patient's need for assistive devices and provide as appropriate  - Encourage maximum independence but intervene and supervise when necessary  - Involve family in performance of ADLs  - Assess for home care needs following discharge   - Consider OT consult to assist with ADL evaluation and planning for discharge  - Provide patient education as appropriate  Outcome: Progressing  Goal: Maintain or return mobility status to optimal level  Description: INTERVENTIONS:  - Assess patient's baseline mobility status (ambulation, transfers, stairs, etc )    - Identify cognitive and physical deficits and behaviors that affect mobility  - Identify mobility aids required to assist with transfers and/or ambulation (gait belt, sit-to-stand, lift, walker, cane, etc )  - [place] fall precautions as indicated by assessment  - Record patient progress and toleration of activity level on Mobility SBAR; progress patient to next Phase/Stage  - Instruct patient to call for assistance with activity based on assessment  - Consider rehabilitation consult to assist with strengthening/weightbearing, etc   Outcome: Progressing     Problem: DISCHARGE PLANNING  Goal: Discharge to home or other facility with appropriate resources  Description: INTERVENTIONS:  - Identify barriers to discharge w/patient and caregiver  - Arrange for needed discharge resources and transportation as appropriate  - Identify discharge learning needs (meds, wound care, etc )  - Arrange for interpretive services to assist at discharge as needed  - Refer to Case Management Department for coordinating discharge planning if the patient needs post-hospital services based on physician/advanced practitioner order or complex needs related to functional status, cognitive ability, or social support system  Outcome: Progressing     Problem: Knowledge Deficit  Goal: Patient/family/caregiver demonstrates understanding of disease process, treatment plan, medications, and discharge instructions  Description: Complete learning assessment and assess knowledge base  Interventions:  - Provide teaching at level of understanding  - Provide teaching via preferred learning methods  Outcome: Progressing     Problem: Nutrition/Hydration-ADULT  Goal: Nutrient/Hydration intake appropriate for improving, restoring or maintaining nutritional needs  Description: Monitor and assess patient's nutrition/hydration status for malnutrition  Collaborate with interdisciplinary team and initiate plan and interventions as ordered  Monitor patient's weight and dietary intake as ordered or per policy  Utilize nutrition screening tool and intervene as necessary  Determine patient's food preferences and provide high-protein, high-caloric foods as appropriate       INTERVENTIONS:  - Monitor oral intake, urinary output, labs, and treatment plans  - Assess nutrition and hydration status and recommend course of action  - Evaluate amount of meals eaten  - Assist patient with eating if necessary   - Allow adequate time for meals  - Recommend/ encourage appropriate diets, oral nutritional supplements, and vitamin/mineral supplements  - Order, calculate, and assess calorie counts as needed  - Recommend, monitor, and adjust tube feedings and TPN/PPN based on assessed needs  - Assess need for intravenous fluids  - Provide specific nutrition/hydration education as appropriate  - Include patient/family/caregiver in decisions related to nutrition  Outcome: Progressing     Problem: Prexisting or High Potential for Compromised Skin Integrity  Goal: Skin integrity is maintained or improved  Description: INTERVENTIONS:  - Identify patients at risk for skin breakdown  - Assess and monitor skin integrity  - Assess and monitor nutrition and hydration status  - Monitor labs   - Assess for incontinence   - Turn and reposition patient  - Assist with mobility/ambulation  - Relieve pressure over bony prominences  - Avoid friction and shearing  - Provide appropriate hygiene as needed including keeping skin clean and dry  - Evaluate need for skin moisturizer/barrier cream  - Collaborate with interdisciplinary team   - Patient/family teaching  - Consider wound care consult   Outcome: Progressing

## 2021-04-15 NOTE — ASSESSMENT & PLAN NOTE
Lab Results   Component Value Date    HGBA1C 7 9 (H) 03/17/2021       Recent Labs     04/16/21  1632 04/16/21  2109 04/17/21  0755 04/17/21  1046   POCGLU 178* 194* 190* 194*       Blood Sugar Average: Last 72 hrs:  (P) 213 [id number]     Plan    Continue Lantus 15 U QD and increade Lispro to 7u from 5u TID with sliding scale  Continue to monitor  Discharge instructions:  Please stop inpatient insulin and restart home metformin, Januvia and glimepiride on discharge  Follow up added in discharge tab with PCP

## 2021-04-15 NOTE — ASSESSMENT & PLAN NOTE
Echo 2018 - EF 50%, no regional wall abnormalities, mildly dilated LV, RA  Prior EKGs reviewed - chronic afib     Plan  -cardiology is following  -Pt on diltiazem 180 mg b i d   -metoprolol 75 mg q12  -home Eliquis 5 mg b i d    -continue telemetry monitoring

## 2021-04-15 NOTE — CONSULTS
Consultation - Geriatric Medicine   Ngoc Martinez  73 y o  male MRN: [id number]  Unit/Bed#: [facility] 525-01 Encounter: [id number]      Assessment/Plan     Acute metabolic encephalopathy   -evidenced by confusion and fluctuations in mental status with hallucinations noted yesterday now with worsening confusion, hypervigilance and paranoia in pt who is typically otherwise fully oriented and appropriate   -likely multifactorial including possible residual osteomyelitis right foot requiring long term antibiotic tx,right foot wounds requiring TMA and recurrent surgical interventions with prolonged hospitalization, surgical procedure, anesthesia, pain, polypharmacy, and poor sleep  -no CNS imaging for review   -WBC slightly trending up but is on antibiotics for possible residual osteomyelitis   -pt denies pain as contributing factor however does have underlying neuropathy which may respond to low dose Gabapentin, given GFR acceptable will start with low dose this am and re-eval for repeat dosing  -recommend start low dose Seroquel 12 5mg now in addition to gabapentin and will re-evaluate later today to determine appropriate re-dosing time/dose   -discussed with patient and wife at bedside, nursing at bedside, and plastics team/int med via tiger text    Possible residual osteomyelitis right foot   -s/p screw fixation right foot 3/4 with persistent open foot wound now s/p flap closure  -ID following, recommend continuation of abx for 6 weeks following flap closure (Doxy through 5/24)  -continue management per ID/medical teams    Cognitive screening   -no prior cognitive testing on record for review, family reports mild forgetfulness at baseline with no significant cognitive decline noted and no prior encephalopathy symptoms which are present today  -no prior CTH or MRI brain for review  -encephalopathy likely multifactorial as above, if focal neurologic deficits develop would recommend checking STAT CNS imaging otherwise could obtain on routine basis as part of cognitive evaluation to determine baseline  -consider comprehensive geriatric assessment as o/p    DM-II with diabetic neuropathy   -hemoglobin A1c 7 9  -glu somewhat poorly controlled currently with sugars in 190s this morning despite insulin gtt, encourage tighter glu control to reduce risk contribution to poor wound healing     Afib with RVR  -currently on aspirin, Eliquis, Cardizem, and Toprol  -transitioned off heparin gtt this am  -continue good rate control     Ambulatory dysfunction   -multifactorial including recent right foot surgeries now with possible residual osteomyelitis   -currently requiring use of wheelchair for mobilization   -encourage good body mechanics and assist with all transfers  -continue fall precautions  -PT and OT pending    Impaired Vision  -recommend use of corrective lenses at all appropriate times  -encourage adequate lighting and encourage use of assistance with ambulation  -keep personal belongings close to person to avoid reaching  -encourage appropriate footwear at all times    Impaired Hearing  -Encourage use of hearing aids at all appropriate times  -encourage providers and caregivers to speak slowly and clearly directly to patient  -minimize background noise to encourage patient engagement  -consider use of hearing amplifier to reduce risk of straining to hear if hearing aids  not sufficient   -continue to encourage use of teach back method to ensure clear communication    Deconditioning/Debility/Frailty  -clinical frailty scale stage 6, moderately frail  -multifactorial including age, osteomyelitis requiring multiple surgical procedures, uncontrolled diabetes, polypharmacy, and multiple additional chronic medical comorbidities  -albumin 3 5, continue to encourage well-balanced nutrition  -continue optimization chronic medical co-morbidities  -continue treatment of acute encephalopathy as may impact patient's response to therapies both surgical and medical    Home medication review   Will obtain medication list from [facility] where patient was PTA    Care coordination: Rounded with Girish Bailon (RN) and wife Kiara Zimmerman) at bedside, rounded with Plastic Surg (Dr Mary Duncan) and Internal Medicine (Dr Paul Villegas) via Dewane Euler Text     History of Present Illness   Physician Requesting Consult: Krystle Wagner,*  Reason for Consult / Principal Problem: Acute metabolic encephalopathy   Hx and PE limited by: N/A  Additional history obtained from: Chart review and patient evaluation as well as wife Trevor Go at bedside    HPI: Shelia Barnard  is a 73y o  year old male type 2 diabetes with diabetic neuropathy, BPH with LUTS, ambulatory dysfunction, disrupted sleep cycle, AFib with RVR, hypertension, hyperlipidemia, impaired mastication, history TIA, and diabetic wound of right foot now s/p failed right foot reconstruction and TMA with complicated post operative course with possible residual osteomyelitis being treated with long term antibiotic course  He is admitted to the surgical service for ongoing treatment of right foot wound and is being seen in consultation by Geriatrics for acute metabolic encephalopathy  He seen examined at bedside, he seems anxious and hypervigilant, although he is oriented to person, place, month, year, and primary reason for hospital admission he is very easily distracted and restless, he is anxious about lines and IV sites as well as medication he is supposed to be taking this morning  He reports that the medications he is getting are not what they are telling him they are and he had refused to take medications overnight  He is very anxious to have IV heparin discontinued and tells me that he thinks its flooding into his veins and dose not believe the number on the pump that it is going slowly, he thinks that it is gushing in and tells me he sees it flowing in rapidly  He is talking to people that are not present in the room   He tells me that he doesn't feel like himself and has felt confused since coming out of anesthesia  Shortly after evaluation I was called back to the room that his family had arrived and his wife at bedside reports that he has never had symptoms like this in the past, she reports that he may have some age related forgetfulness but no prior confusion to this extent and no history of psychosis or hallucinations  He is much eased by her presence  She reports that they have had a lot of changes in the past year including downsizing their home and recently lost their dog which are likely additional stressors  Inpatient consult to Gerontology  Consult performed by: Rishi Velez DO  Consult ordered by: Justin Calloway MD        Review of Systems   Constitutional: Negative for appetite change, chills and fever  HENT: Positive for hearing loss (uses hearing aid)  Negative for dental problem and trouble swallowing  Eyes: Negative for visual disturbance (glasses for reading)  Respiratory: Negative for cough and shortness of breath  Cardiovascular: Negative for chest pain and palpitations  Gastrointestinal: Negative for abdominal pain, nausea and vomiting  Endocrine: Negative  Genitourinary: Negative for difficulty urinating  Musculoskeletal: Positive for gait problem (uses wheelchair due to foot wound)  Skin: Negative  Allergic/Immunologic: Negative  Neurological: Negative for dizziness, weakness, light-headedness and numbness  Hematological: Negative  Psychiatric/Behavioral: Positive for confusion, decreased concentration and sleep disturbance (last two nights )  All other systems reviewed and are negative      Historical Information   Past Medical History:   Diagnosis Date    Arthritis     [facility]     Diagnosed 11/2018    Benign prostate hyperplasia 04/2002    Cellulitis     right lower leg     Colon polyp 2006    Diabetes mellitus ([facility]     Hearing aid worn bilat    Hearing loss     90% loss left ear and 40% right ear    History of clubfoot     "since birth"    History of pneumonia     History of TIA (transient ischemic attack) 04/25/2017 11/30/18 pt denies    Hyperlipidemia 5/15/2014    Hypertension     Infectious viral hepatitis     "cant remember type"    Irregular heart beat     Neuropathy     both feet    Osteomyelitis ([facility] )     right great toe    Right middle lobe pulmonary nodule 11/6/2018    Seasickness     Teeth missing     Type 2 diabetes mellitus with diabetic neuropathy ([facility] ) 11/6/2018    Wears glasses     reading    Wound, open     bottom of right foot     Past Surgical History:   Procedure Laterality Date    BUNIONECTOMY Right 12/4/2018    Procedure: 5TH METATARSAL BONE PARTIAL RESECTION, FULL THICKNESS DEBRIDEMENT OF DIABETIC ULCER;  Surgeon: Carlon Dakin, DPM;  [facility];  Service: Podiatry    CLUB FOOT RELEASE Bilateral     COLONOSCOPY      EXTERNAL FIXATOR APPLICATION Right 3/33/6686    Procedure: Application multiplane external fixation;  Surgeon: Ernie Lopez DPM;  [facility];  Service: Podiatry    FOOT HARDWARE REMOVAL Right 2/17/2021    Procedure: REMOVAL EXTERNAL FIXATOR;  Surgeon: Ernie Lopez DPM;  [facility];  Service: Podiatry    INCISION AND DRAINAGE OF WOUND Right 2/17/2021    Procedure: INCISION AND DRAINAGE (I&D) EXTREMITY;  Surgeon: Ernie Lopez DPM;  [facility];  Service: Podiatry    ORIF FOOT FRACTURE Right 3/4/2021    Procedure: VAC PLACEMENT; SCREW FIXATION RIGHT FOOT FUSION;  Surgeon: Ernie Lopez DPM;  [facility];  Service: Podiatry    [address] [place] [address] Right 2/22/2021    Procedure: AMPUTATION TRANSMETATARSAL (TMA), REMOVAL NAIL IM T2 ICF HARDWARE;  Surgeon: Ernie Lopez DPM;  [facility];  Service: Podiatry    [place] FUSION FOOT BONES,MIDTARSAL,OSTEOTMY Right 2/12/2021    Procedure: foot ARTHRODESIS/FUSION; Surgeon: Chad Beaulieu DPM;  [facility];  Service: Podiatry    MN LENGTH/SHORT LEG/ANKL TENDON,SINGLE Right 2/12/2021    Procedure: LENGTHEN TIBIAL TENDON, TRANS HEEL CORD;  Surgeon: Chad Beaulieu DPM;  [facility];  Service: Podiatry    MN MUSCLE-SKIN FLAP,TRUNK Right 4/12/2021    Procedure: RECONSTRUCTION MICROSURGICAL W/ FREE FLAP;  Surgeon: Norma Marti MD;  [facility];  Service: Plastics    MN MUSCLE-SKIN FLAP,TRUNK Right 4/12/2021    Procedure: TAKEBACK RECONSTRUCTION MICROSURGICAL W/ FREE FLAP;  Surgeon: Norma Marti MD;  [facility];  Service: Plastics    MN MUSCLE-SKIN FLAP,TRUNK Right 4/13/2021    Procedure: RECONSTRUCTION MICROSURGICAL W/ FREE FLAP, RE EXPLORATION, MICRO VASCULAR REVISION;  Surgeon: Norma Marti MD;  [facility];  Service: Plastics    MN PART [address] BONE METATARSAL HEAD,EA Right 12/29/2020    Procedure: EXCISION EXOSTOSIS;  Surgeon: Chad Beaulieu DPM;  [facility];  Service: Wyvonna Bigger SURG WND EXTEN/COMPLIC Right 13/69/2873    Procedure: PRIMARY DELAYED CLOSURE;  Surgeon: Chad Beaulieu DPM;  [facility];  Service: Podiatry    TOE AMPUTATION Right     partial great toe    TONSILLECTOMY      VAC DRESSING APPLICATION Right 7/4/1783    Procedure: APPLICATION VAC DRESSING EXTREMITY;  Surgeon: Norma Marti MD;  [facility];  Service: Plastics    [facility] [address] [place] [address] DEBRIDEMENT Right 3/1/2021    Procedure: DEBRIDEMENT LOWER EXTREMITY ([address] OUT); Surgeon: Norma Marti MD;  [facility];  Service: Plastics    WOUND DEBRIDEMENT Right 4/7/2021    Procedure: DEBRIDEMENT RIGHT FOOT;  Surgeon: Norma Marti MD;  [facility];  Service: Plastics    WOUND DEBRIDEMENT Right 4/12/2021    Procedure: DEBRIDEMENT LOWER EXTREMITY [facility] OUT);   Surgeon: Norma Marti MD;  [facility]; Service: Plastics     Social History   Social History     Substance and Sexual Activity   Alcohol Use Yes    Frequency: 2-4 times a month    Drinks per session: 1 or 2    Comment: socially     Social History     Substance and Sexual Activity   Drug Use Not Currently     Social History     Tobacco Use   Smoking Status Former Smoker    Types: Cigarettes    Quit date: 1988    Years since quittin 4   Smokeless Tobacco Former User   Tobacco Comment    exposure to passive smoke     Family History:   Family History   Problem Relation Age of Onset    Diabetes Father         mellitus     Meds/Allergies   all current active meds have been reviewed    Allergies   Allergen Reactions    Simvastatin Myalgia    Itraconazole Other (See Comments)     Pt denies knowledge of allergy         Objective     Intake/Output Summary (Last 24 hours) at 4/15/2021 0738  Last data filed at 4/15/2021 0701  Gross per 24 hour   Intake 1276 81 ml   Output 5355 ml   Net -4078 19 ml     Invasive Devices     Peripheral Intravenous Line            Peripheral IV 21 Distal;Upper;Left Arm 3 days    Peripheral IV 21 Right Forearm 2 days          Arterial Line            Arterial Line Left Radial -- days          Drain            Closed/Suction Drain Right Thigh Bulb 15 Fr  2 days    Urethral Catheter Temperature probe 16 Fr  2 days              Physical Exam  Vitals signs and nursing note reviewed  Constitutional:       Appearance: Normal appearance  He is not toxic-appearing  Comments: Appears stated age, appears hypervigilant and restless   HENT:      Head: Normocephalic and atraumatic  Nose: Nose normal       Mouth/Throat:      Mouth: Mucous membranes are moist       Comments: Dentition intact  Eyes:      General:         Right eye: No discharge  Left eye: No discharge  Conjunctiva/sclera: Conjunctivae normal    Neck:      Musculoskeletal: Neck supple        Comments: Trachea midline, phonation normal  Cardiovascular:      Rate and Rhythm: Tachycardia present  Rhythm irregular  Comments: Distant heart sounds  Pulmonary:      Effort: Pulmonary effort is normal  No respiratory distress  Breath sounds: No wheezing  Abdominal:      General: There is no distension  Palpations: Abdomen is soft  Tenderness: There is no abdominal tenderness  Musculoskeletal:      Comments: Right foot deformity, sutures in place   Skin:     General: Skin is warm  Comments: Left thigh dressings saturated in blood   Neurological:      Mental Status: He is alert        Comments: Awake and alert, oriented to person, place, general situation however appears to be hallucinating and talking to people not in room   Psychiatric:      Comments: Restless and confused, suspicious       Lab Results:   I have personally reviewed pertinent lab results including the following:  -sodium 138, potassium 3 8, chloride 108, CO2 24, BUN 12, creatinine 0 82, glucose 186, calcium 8 4, GFR 90  -hemoglobin 10 3, hematocrit 32 6, WBC 12 54, platelets 295  -TSH 5 28 (2019)  -A1c 7 9    I have personally reviewed the following imaging study reports in PACS:    Right foot XR 03/04/2021:  No acute fracture or dislocation, no overt bony destruction, small retrocalcaneal spur    Therapies:   PT: Pending   OT: Pending    VTE Prophylaxis:  Currently on heparin GTT transitioning back to Eliquis    Code Status: Prior  Advance Directive and Living Will:      Power of :    POLST:      Family and Social Support: Wife    Goals of Care: Be less confused and get some sleep

## 2021-04-16 LAB
ANION GAP SERPL CALCULATED.3IONS-SCNC: 6 MMOL/L (ref 4–13)
BUN SERPL-MCNC: 14 MG/DL (ref 5–25)
CALCIUM SERPL-MCNC: 9 MG/DL (ref 8.3–10.1)
CHLORIDE SERPL-SCNC: 108 MMOL/L (ref 100–108)
CO2 SERPL-SCNC: 26 MMOL/L (ref 21–32)
CREAT SERPL-MCNC: 0.84 MG/DL (ref 0.6–1.3)
GFR SERPL CREATININE-BSD FRML MDRD: 89 ML/MIN/1.73SQ M
GLUCOSE SERPL-MCNC: 141 MG/DL (ref 65–140)
GLUCOSE SERPL-MCNC: 157 MG/DL (ref 65–140)
GLUCOSE SERPL-MCNC: 178 MG/DL (ref 65–140)
GLUCOSE SERPL-MCNC: 190 MG/DL (ref 65–140)
GLUCOSE SERPL-MCNC: 194 MG/DL (ref 65–140)
POTASSIUM SERPL-SCNC: 4.3 MMOL/L (ref 3.5–5.3)
SODIUM SERPL-SCNC: 140 MMOL/L (ref 136–145)

## 2021-04-16 PROCEDURE — 82948 REAGENT STRIP/BLOOD GLUCOSE: CPT

## 2021-04-16 PROCEDURE — NC001 PR NO CHARGE: Performed by: PLASTIC SURGERY

## 2021-04-16 PROCEDURE — 99232 SBSQ HOSP IP/OBS MODERATE 35: CPT | Performed by: INTERNAL MEDICINE

## 2021-04-16 PROCEDURE — 80048 BASIC METABOLIC PNL TOTAL CA: CPT | Performed by: STUDENT IN AN ORGANIZED HEALTH CARE EDUCATION/TRAINING PROGRAM

## 2021-04-16 RX ORDER — QUETIAPINE FUMARATE 25 MG/1
12.5 TABLET, FILM COATED ORAL EVERY MORNING
Status: DISCONTINUED | OUTPATIENT
Start: 2021-04-17 | End: 2021-04-30 | Stop reason: HOSPADM

## 2021-04-16 RX ORDER — GINSENG 100 MG
1 CAPSULE ORAL 2 TIMES DAILY
Status: DISCONTINUED | OUTPATIENT
Start: 2021-04-16 | End: 2021-04-30 | Stop reason: HOSPADM

## 2021-04-16 RX ORDER — QUETIAPINE FUMARATE 25 MG/1
25 TABLET, FILM COATED ORAL
Status: DISCONTINUED | OUTPATIENT
Start: 2021-04-16 | End: 2021-04-30 | Stop reason: HOSPADM

## 2021-04-16 RX ADMIN — APIXABAN 5 MG: 5 TABLET, FILM COATED ORAL at 08:36

## 2021-04-16 RX ADMIN — DOXYCYCLINE 100 MG: 100 CAPSULE ORAL at 08:29

## 2021-04-16 RX ADMIN — INSULIN LISPRO 1 UNITS: 100 INJECTION, SOLUTION INTRAVENOUS; SUBCUTANEOUS at 08:38

## 2021-04-16 RX ADMIN — INSULIN LISPRO 5 UNITS: 100 INJECTION, SOLUTION INTRAVENOUS; SUBCUTANEOUS at 11:51

## 2021-04-16 RX ADMIN — INSULIN LISPRO 5 UNITS: 100 INJECTION, SOLUTION INTRAVENOUS; SUBCUTANEOUS at 08:38

## 2021-04-16 RX ADMIN — INSULIN LISPRO 1 UNITS: 100 INJECTION, SOLUTION INTRAVENOUS; SUBCUTANEOUS at 17:21

## 2021-04-16 RX ADMIN — INSULIN LISPRO 5 UNITS: 100 INJECTION, SOLUTION INTRAVENOUS; SUBCUTANEOUS at 17:22

## 2021-04-16 RX ADMIN — DOCUSATE SODIUM 100 MG: 100 CAPSULE, LIQUID FILLED ORAL at 17:19

## 2021-04-16 RX ADMIN — DOCUSATE SODIUM 100 MG: 100 CAPSULE, LIQUID FILLED ORAL at 08:29

## 2021-04-16 RX ADMIN — APIXABAN 5 MG: 5 TABLET, FILM COATED ORAL at 17:19

## 2021-04-16 RX ADMIN — DILTIAZEM HYDROCHLORIDE 180 MG: 60 TABLET, FILM COATED ORAL at 08:29

## 2021-04-16 RX ADMIN — METOPROLOL SUCCINATE 75 MG: 50 TABLET, EXTENDED RELEASE ORAL at 08:30

## 2021-04-16 RX ADMIN — GABAPENTIN 100 MG: 100 CAPSULE ORAL at 08:30

## 2021-04-16 RX ADMIN — LISINOPRIL 40 MG: 20 TABLET ORAL at 08:30

## 2021-04-16 RX ADMIN — DOXYCYCLINE 100 MG: 100 CAPSULE ORAL at 21:06

## 2021-04-16 RX ADMIN — INSULIN LISPRO 2 UNITS: 100 INJECTION, SOLUTION INTRAVENOUS; SUBCUTANEOUS at 21:43

## 2021-04-16 RX ADMIN — QUETIAPINE FUMARATE 25 MG: 25 TABLET ORAL at 21:06

## 2021-04-16 RX ADMIN — DOCUSATE SODIUM AND SENNOSIDES 1 TABLET: 8.6; 5 TABLET ORAL at 21:06

## 2021-04-16 RX ADMIN — MELATONIN TAB 3 MG 3 MG: 3 TAB at 21:06

## 2021-04-16 RX ADMIN — BACITRACIN 1 LARGE APPLICATION: 500 OINTMENT TOPICAL at 11:19

## 2021-04-16 RX ADMIN — INSULIN GLARGINE 15 UNITS: 100 INJECTION, SOLUTION SUBCUTANEOUS at 08:39

## 2021-04-16 RX ADMIN — QUETIAPINE FUMARATE 12.5 MG: 25 TABLET ORAL at 08:30

## 2021-04-16 RX ADMIN — METOPROLOL SUCCINATE 75 MG: 50 TABLET, EXTENDED RELEASE ORAL at 21:06

## 2021-04-16 RX ADMIN — ASPIRIN 325 MG ORAL TABLET 325 MG: 325 PILL ORAL at 08:31

## 2021-04-16 RX ADMIN — INSULIN LISPRO 2 UNITS: 100 INJECTION, SOLUTION INTRAVENOUS; SUBCUTANEOUS at 11:51

## 2021-04-16 RX ADMIN — DILTIAZEM HYDROCHLORIDE 180 MG: 60 TABLET, FILM COATED ORAL at 17:19

## 2021-04-16 RX ADMIN — ATORVASTATIN CALCIUM 40 MG: 40 TABLET, FILM COATED ORAL at 17:19

## 2021-04-16 NOTE — PROGRESS NOTES
Progress Note - Infectious Disease   Will Antes  73 y o  male MRN: [id number]  Unit/Bed#: [facility] 525-01 Encounter: [id number]      Impression/Recommendations:  1  Possible residual osteomyelitis of right foot   Patient is status post screw fixation right foot on , with operative bone scraping culture growing Enterobacter   Patient has been on p o  Doxycycline since and is tolerating it well  Richardson Pate, he had a persistent open foot wound   Patient is now status post flap closure of right foot   With successful flap closure, there is no further bone exposed   The best course of action at this point is to have patient continue antibiotic for 6 weeks after flap closure  Continue p o  Doxycycline  Treat x6 weeks after flap closure, through       2  Open right foot wound   Patient is status post flap closure, requiring return to OR twice for revision due to congestion   Wound is clean, without evidence of cellulitis  Wound care per Plastic surgery      3  Status post screw fixation right foot on       4  Status post failed right foot reconstruction, requiring TMA   All hardware prior to [facility] were removed  Richardson Pate, as in above, patient is status post screw fixation of right foot after TMA      5  DM, somewhat poorly controlled, with elevated hemoglobin A1c  Management per Medicine Service      Discussed with patient in detail regarding the above plan        Antibiotics:  Doxycycline  POD # 4      Subjective:  Patient is awake and alert  He still has mild confusion  Minimal pain in right foot  Temperature stays down   No chills    He is tolerating antibiotic well   No nausea, vomiting or diarrhea      Objective:  Vitals:  Temp:  [98 2 °F (36 8 °C)-98 8 °F (37 1 °C)] 98 4 °F (36 9 °C)  HR:  [] 131  Resp:  [18] 18  BP: (130-169)/(60-99) 157/99  SpO2:  [96 %-100 %] 97 %  Temp (24hrs), Av 4 °F (36 9 °C), Min:98 2 °F (36 8 °C), Max:98 8 °F (37 1 °C)  Current: Temperature: 98 4 °F (36 9 °C)    Physical Exam:     General: Awake, alert, cooperative, no distress  Neck:  Supple  No mass  No lymphadenopathy  Lungs: Expansion symmetric, no rales, no wheezing, respirations unlabored  Heart:  Regular rate and rhythm, S1 and S2 normal, no murmur  Abdomen: Soft, nondistended, non-tender, bowel sounds active all four quadrants, no masses, no organomegaly  Extremities: Stable foot edema  Flap wound clean, without purulence  No erythema/warmth  Nontender  Skin:  No rash  Neuro: Moves all extremities  Invasive Devices     Peripheral Intravenous Line            Peripheral IV 04/16/21 Left Antecubital less than 1 day          Drain            Closed/Suction Drain Right Thigh Bulb 15 Fr  3 days    External Urinary Catheter Small less than 1 day                Labs studies:   I have personally reviewed pertinent labs  Results from last 7 days   Lab Units 04/16/21  0552 04/15/21  0809 04/14/21  0449  04/13/21  0033 04/12/21  2324  04/12/21  1449  04/12/21  0008   POTASSIUM mmol/L 4 3 3 8 4 1   < >  --   --    < >  --   --  3 9   CHLORIDE mmol/L 108 108 115*   < >  --   --    < >  --   --  110*   CO2 mmol/L 26 24 23   < >  --   --    < >  --   --  24   CO2, I-STAT mmol/L  --   --   --   --  23 23  --  24   < >  --    BUN mg/dL 14 12 9   < >  --   --    < >  --   --  16   CREATININE mg/dL 0 84 0 82 0 81   < >  --   --    < >  --   --  0 98   EGFR ml/min/1 73sq m 89 90 91   < >  --   --    < >  --   --  78   GLUCOSE, ISTAT mg/dl  --   --   --   --  282* 317*  --  184*   < >  --    CALCIUM mg/dL 9 0 8 4 8 2*   < >  --   --    < >  --   --  9 0   AST U/L  --   --   --   --   --   --   --   --   --  21   ALT U/L  --   --   --   --   --   --   --   --   --  59   ALK PHOS U/L  --   --   --   --   --   --   --   --   --  111    < > = values in this interval not displayed       Results from last 7 days   Lab Units 04/15/21  0448 04/14/21  0630 04/13/21  1412   WBC Thousand/uL 12 54* 12 84* 14 27* HEMOGLOBIN g/dL 10 3* 9 6* 9 2*   PLATELETS Thousands/uL 227 210 218           Imaging Studies:   I have personally reviewed pertinent imaging study reports and images in PACS  EKG, Pathology, and Other Studies:   I have personally reviewed pertinent reports

## 2021-04-16 NOTE — PROGRESS NOTES
Progress Note - Plastic Surgery   Preet Spikes  73 y o  male MRN: [id number]  Unit/Bed#: [facility] 525-01 Encounter: [id number]    Assessment:  Principal Problem:    S/P transmetatarsal amputation of foot, right (HCC)  Active Problems:    Atrial fibrillation with RVR ([facility] )    Hyperlipidemia    Type 2 diabetes mellitus with diabetic neuropathy ([facility] )    Essential hypertension    Acquired absence of other right toe(s) ([facility] )    Deep disruption or dehiscence of operation wound    Ambulatory dysfunction    Diabetic ulcer of right 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, with [facility] layer [facility]    Sleep disturbance    73 y o  male POD 4 from ALT reconstruction of R foot defect, subsequent takebacks on POD 0 and 1 for venous congestion  Overall doing well  Delirium improved this morning on BID seroquel  Clinically stable   VSS, A fib rate controlled   R foot free flap doing well     Plan:  - Change to Q4 hr flap checks this evening, strict bedrest, elevation of RLE, hamilton hugger    - Cont Eliquis  - Cont regular diet, off IVF  - Appreciate assistance of Gerontology, Cards, ID and IM  - Daily xeroform replacement to foot  - Discussed with Dr Cheryl Barrientos MD  Resident, Plastic & Reconstructive Surgery  [phone] (c)     Subjective/Objective   Chief Complaint: R foot wound    Subjective: resting in bed  Pain controlled  Delirium improved this morning  Nursing staff states he slept overnight which an improvement  Objective:     Blood pressure 157/99, pulse (!) 131, temperature 98 4 °F (36 9 °C), temperature source Oral, resp  rate 18, height 6' 5" (1 956 m), weight 108 kg (237 lb 1 3 oz), SpO2 97 %  ,Body mass index is 28 11 kg/m²        Intake/Output Summary (Last 24 hours) at 4/16/2021 1615  Last data filed at 4/16/2021 1431  Gross per 24 hour   Intake 480 ml   Output 1860 ml   Net -1380 ml       Invasive Devices     Peripheral Intravenous Line            Peripheral IV 04/16/21 Left Antecubital less than 1 day          Drain            Closed/Suction Drain Right Thigh Bulb 15 Fr  4 days    External Urinary Catheter Small less than 1 day                Physical Exam:   Gen: NAD  Neuro: Alert, oriented  CV: regular rate  Pulm: no respiratory distress  Abd: nondistended  GI: Astorga catheter  Ext: RLE free flap is pink, soft, warm  Cap refill is about 2 seconds  Micro flow  with strong venous doppler signal via A, no signal via B  Vioptix replaced, perfusion in the 50% range, signal quality > 90%  Arterial and venous signals at both marking sutures on the flap and elsewhere  Some serosang ooze from flap edges     Lab, Imaging and other studies:I have personally reviewed pertinent lab results      VTE Pharmacologic Prophylaxis: Sequential compression device (Venodyne), heparin drip, switching to Eliquis today  VTE Mechanical Prophylaxis: sequential compression device

## 2021-04-16 NOTE — PROGRESS NOTES
Progress Note - Geriatric Medicine   Arina Laird  73 y o  male MRN: [id number]  Unit/Bed#: [facility] 525-01 Encounter: [id number]      Assessment/Plan:    Acute metabolic encephalopathy  -markedly improved with addition of Seroquel, tolerated low-dose initiated yesterday but would likely benefit from slightly higher dose regimen at bedtime, will increase evening dose to 25 mg, keep morning 12 5 mg, recommend checking EKG to monitor QTc, if necessary over weekend to increase to 25mg BID would obtain EKG to ensure that QTc is not over 500, ensure that mag, phos and potassium are adequate and if QTc prohibitive of increasing Seroquel dosing could consider Buspar 7 5mg BID as alternative  -discussed Seroquel regimen recommendations with Plastic surgery, patient and and patient's wife at bedside, all in agreement  -continue to provide frequent reorientation and redirection as appropriate  -continue common quiet environment to reduce risk of overstimulation  -pain appears to be well controlled  -continue to monitor for fecal and urinary retention    Possible residual osteomyelitis right foot  -ID following, recommend continue her antibiotics for 6 weeks following follow-up closure with doxycycline through 05/24/2021  -management per primary team/ID    DM-II with diabetic neuropathy  -continue dose titration of insulin gtt to avoid large fluctuations in flu and goal normoglycemia for wound healing     AFib with RVR  -maintained aspirin, Eliquis, Cardizem, Toprol  -Cardiology following    Ambulatory dysfunction  -multifactorial including recent right foot reconstruction  -continue good body mechanics and assist with all transfers     Impaired vision  -encourage use of corrective lenses all appropriate times  -consider large print font for any educational materials provided to patient    Impaired hearing  -recommend use of hearing aid at all appropriate times  -use teach back method to ensure clear communication Deconditioning/debility/frailty  -continue good glu control  -optimize nutrition and chronic medical conditions   -continue tx acute encephalopathy as may impact response to therapies and sense of well being/quality of life    Care coordination: Rounded at bedside with Dr Jann Nageotte (RN), and wife at bedside    Subjective:     Patient seen examined at bedside where he is lying resting, wife at his side, plastic surgery team attending to his right foot surgical site  Patient reports slightly tired today and mildly anxious, all other ROS negative  Objective:     Vitals: Blood pressure 157/99, pulse (!) 131, temperature 98 4 °F (36 9 °C), temperature source Oral, resp  rate 18, height 6' 5" (1 956 m), weight 108 kg (237 lb 1 3 oz), SpO2 97 %  ,Body mass index is 28 11 kg/m²  Intake/Output Summary (Last 24 hours) at 4/16/2021 1031  Last data filed at 4/16/2021 0801  Gross per 24 hour   Intake --   Output 1260 ml   Net -1260 ml     Current Medications: Reviewed    Physical Exam:   Physical Exam  Vitals signs and nursing note reviewed  Constitutional:       Appearance: Normal appearance  Comments: Appears comfortable   HENT:      Head: Normocephalic and atraumatic  Nose: Nose normal       Mouth/Throat:      Mouth: Mucous membranes are moist    Eyes:      General: No scleral icterus  Right eye: No discharge  Left eye: No discharge  Conjunctiva/sclera: Conjunctivae normal    Neck:      Musculoskeletal: Neck supple  Comments: Trachea midline  Phonation normal  Cardiovascular:      Rate and Rhythm: Normal rate  Pulses: Normal pulses  Pulmonary:      Effort: Pulmonary effort is normal  No respiratory distress  Abdominal:      Comments: Obese, soft, nontender   Musculoskeletal:      Comments: RLE s/p TMA     Skin:     General: Skin is warm and dry  Neurological:      Mental Status: He is alert        Comments: Awake and alert, answers questions appropriately Psychiatric:      Comments: Much less anxious than prior exam        Invasive Devices     Peripheral Intravenous Line            Peripheral IV 04/16/21 Left Antecubital less than 1 day          Drain            Closed/Suction Drain Right Thigh Bulb 15 Fr  3 days    External Urinary Catheter Small less than 1 day              Lab, Imaging and other studies: I have personally reviewed pertinent reports

## 2021-04-16 NOTE — NURSING NOTE
Pt attempted to get out of bed and bed alarm alerted  Nursing staff responded immediately and patient had moved to a dangle at side of bed  [employer] tissue oximeter transmitter had fallen off  RN checked doppler and  of the flap which were both audible and present  Dr Balta Nunez notified  Per anjali Agrawal to continue checking doppler and  without tissue oximetry overnight  No one available for new transmitter and we will wait until AM  RN to continue to monitor flap q2 and will notify if any changes

## 2021-04-16 NOTE — PLAN OF CARE
Problem: PAIN - ADULT  Goal: Verbalizes/displays adequate comfort level or baseline comfort level  Description: Interventions:  - Encourage patient to monitor pain and request assistance  - Assess pain using appropriate pain scale  - Administer analgesics based on type and severity of pain and evaluate response  - Implement non-pharmacological measures as appropriate and evaluate response  - Consider cultural and social influences on pain and pain management  - Notify physician/advanced practitioner if interventions unsuccessful or patient reports new pain  Outcome: Progressing     Problem: INFECTION - ADULT  Goal: Absence or prevention of progression during hospitalization  Description: INTERVENTIONS:  - Assess and monitor for signs and symptoms of infection  - Monitor lab/diagnostic results  - Monitor all insertion sites, i e  indwelling lines, tubes, and drains  - Monitor endotracheal if appropriate and nasal secretions for changes in amount and color  - [place] appropriate cooling/warming therapies per order  - Administer medications as ordered  - Instruct and encourage patient and family to use good hand hygiene technique  - Identify and instruct in appropriate isolation precautions for identified infection/condition  Outcome: Progressing     Problem: CONFUSION/THOUGHT DISTURBANCE  Goal: Thought disturbances (confusion, delirium, depression, dementia or psychosis) are managed to maintain or return to baseline mental status and functional level  Description: INTERVENTIONS:  - Assess for possible contributors to  thought disturbance, including but not limited to medications, infection, impaired vision or hearing, underlying metabolic abnormalities, dehydration, respiratory compromise,  psychiatric diagnoses and notify attending PHYSICAN/AP  - Monitor and intervene to maintain adequate nutrition, hydration, elimination, sleep and activity  - Decrease environmental stimuli, including noise as appropriate    - Provide frequent contacts to provide refocusing, direction and reassurance as needed  Approach patient calmly with eye contact and at their level    - [place] high risk fall precautions, aspiration precautions and other safety measures, as indicated  - If delirium suspected, notify physician/AP of change in condition and request immediate in-person evaluation  - Pursue consults as appropriate including Geriatric (campus dependent), OT for cognitive evaluation/activity planning, psychiatric, pastoral care, etc   Outcome: Progressing

## 2021-04-17 LAB
GLUCOSE SERPL-MCNC: 128 MG/DL (ref 65–140)
GLUCOSE SERPL-MCNC: 179 MG/DL (ref 65–140)
GLUCOSE SERPL-MCNC: 190 MG/DL (ref 65–140)
GLUCOSE SERPL-MCNC: 194 MG/DL (ref 65–140)

## 2021-04-17 PROCEDURE — 99232 SBSQ HOSP IP/OBS MODERATE 35: CPT | Performed by: INTERNAL MEDICINE

## 2021-04-17 PROCEDURE — 99024 POSTOP FOLLOW-UP VISIT: CPT | Performed by: PLASTIC SURGERY

## 2021-04-17 PROCEDURE — 97110 THERAPEUTIC EXERCISES: CPT

## 2021-04-17 PROCEDURE — 97163 PT EVAL HIGH COMPLEX 45 MIN: CPT

## 2021-04-17 PROCEDURE — 82948 REAGENT STRIP/BLOOD GLUCOSE: CPT

## 2021-04-17 RX ADMIN — INSULIN LISPRO 5 UNITS: 100 INJECTION, SOLUTION INTRAVENOUS; SUBCUTANEOUS at 09:42

## 2021-04-17 RX ADMIN — APIXABAN 5 MG: 5 TABLET, FILM COATED ORAL at 17:12

## 2021-04-17 RX ADMIN — INSULIN LISPRO 2 UNITS: 100 INJECTION, SOLUTION INTRAVENOUS; SUBCUTANEOUS at 09:41

## 2021-04-17 RX ADMIN — APIXABAN 5 MG: 5 TABLET, FILM COATED ORAL at 09:39

## 2021-04-17 RX ADMIN — DOCUSATE SODIUM AND SENNOSIDES 1 TABLET: 8.6; 5 TABLET ORAL at 21:40

## 2021-04-17 RX ADMIN — MELATONIN TAB 3 MG 3 MG: 3 TAB at 21:40

## 2021-04-17 RX ADMIN — ASPIRIN 325 MG ORAL TABLET 325 MG: 325 PILL ORAL at 09:39

## 2021-04-17 RX ADMIN — DOCUSATE SODIUM 100 MG: 100 CAPSULE, LIQUID FILLED ORAL at 17:12

## 2021-04-17 RX ADMIN — DILTIAZEM HYDROCHLORIDE 180 MG: 60 TABLET, FILM COATED ORAL at 17:12

## 2021-04-17 RX ADMIN — DILTIAZEM HYDROCHLORIDE 180 MG: 60 TABLET, FILM COATED ORAL at 09:40

## 2021-04-17 RX ADMIN — INSULIN LISPRO 5 UNITS: 100 INJECTION, SOLUTION INTRAVENOUS; SUBCUTANEOUS at 12:47

## 2021-04-17 RX ADMIN — METOPROLOL SUCCINATE 75 MG: 50 TABLET, EXTENDED RELEASE ORAL at 09:40

## 2021-04-17 RX ADMIN — DOCUSATE SODIUM 100 MG: 100 CAPSULE, LIQUID FILLED ORAL at 09:39

## 2021-04-17 RX ADMIN — DOXYCYCLINE 100 MG: 100 CAPSULE ORAL at 09:39

## 2021-04-17 RX ADMIN — BACITRACIN 1 LARGE APPLICATION: 500 OINTMENT TOPICAL at 17:19

## 2021-04-17 RX ADMIN — ATORVASTATIN CALCIUM 40 MG: 40 TABLET, FILM COATED ORAL at 17:12

## 2021-04-17 RX ADMIN — METOPROLOL SUCCINATE 75 MG: 50 TABLET, EXTENDED RELEASE ORAL at 21:40

## 2021-04-17 RX ADMIN — QUETIAPINE FUMARATE 12.5 MG: 25 TABLET ORAL at 09:40

## 2021-04-17 RX ADMIN — QUETIAPINE FUMARATE 25 MG: 25 TABLET ORAL at 21:40

## 2021-04-17 RX ADMIN — GABAPENTIN 100 MG: 100 CAPSULE ORAL at 09:39

## 2021-04-17 RX ADMIN — LISINOPRIL 40 MG: 20 TABLET ORAL at 09:39

## 2021-04-17 RX ADMIN — DOXYCYCLINE 100 MG: 100 CAPSULE ORAL at 21:40

## 2021-04-17 RX ADMIN — INSULIN GLARGINE 15 UNITS: 100 INJECTION, SOLUTION SUBCUTANEOUS at 09:47

## 2021-04-17 RX ADMIN — INSULIN LISPRO 2 UNITS: 100 INJECTION, SOLUTION INTRAVENOUS; SUBCUTANEOUS at 12:47

## 2021-04-17 RX ADMIN — INSULIN LISPRO 1 UNITS: 100 INJECTION, SOLUTION INTRAVENOUS; SUBCUTANEOUS at 17:18

## 2021-04-17 NOTE — PHYSICAL THERAPY NOTE
Physical Therapy Evaluation [id number]    Patient Name: Kaylan Falcon      Today's Date: 4/17/2021     Problem List  Principal Problem:    S/P transmetatarsal amputation of foot, right (HCC)  Active Problems:    Hyperlipidemia    Type 2 diabetes mellitus with diabetic neuropathy ([facility])    Atrial fibrillation with RVR ([address] )    Essential hypertension    Acquired absence of other right toe(s) ([address] )    Deep disruption or dehiscence of operation wound    Ambulatory dysfunction    Diabetic ulcer of right 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, with fat layer exposed ([address] )    Sleep disturbance       Past Medical History  Past Medical History:   Diagnosis Date    Arthritis     Atrial fibrillation ([address] )     Diagnosed 11/2018    Benign prostate hyperplasia 04/2002    Cellulitis     right lower leg     Colon polyp 2006    Diabetes mellitus ([address] )     Hearing aid worn     bilat    Hearing loss     90% loss left ear and 40% right ear    History of clubfoot     "since birth"    History of pneumonia     History of TIA (transient ischemic attack) 04/25/2017 11/30/18 pt denies    Hyperlipidemia 5/15/2014    Hypertension     Infectious viral hepatitis     "cant remember type"    Irregular heart beat     Neuropathy     both feet    Osteomyelitis ([address] )     right great toe    Right middle lobe pulmonary nodule 11/6/2018    Seasickness     Teeth missing     Type 2 diabetes mellitus with diabetic neuropathy ([address] ) 11/6/2018    Wears glasses     reading    Wound, open     bottom of right foot        Past Surgical History  Past Surgical History:   Procedure Laterality Date    BUNIONECTOMY Right 12/4/2018    Procedure: 5TH METATARSAL BONE PARTIAL RESECTION, FULL THICKNESS DEBRIDEMENT OF DIABETIC ULCER;  Surgeon: Missy Galeano DPM;  [facility];  Service: Podiatry    CLUB FOOT RELEASE Bilateral     COLONOSCOPY      EXTERNAL FIXATOR APPLICATION Right 2/12/2021    Procedure: Application multiplane external fixation;  Surgeon: Mary Kay Fletcher DPM;  [facility];  Service: Podiatry    FOOT HARDWARE REMOVAL Right 2/17/2021    Procedure: REMOVAL EXTERNAL FIXATOR;  Surgeon: Mary Kay Fletcher DPM;  [facility];  Service: Podiatry    INCISION AND DRAINAGE OF WOUND Right 2/17/2021    Procedure: INCISION AND DRAINAGE (I&D) EXTREMITY;  Surgeon: Mary Kay Fletcher DPM;  [facility];  Service: Podiatry    ORIF FOOT FRACTURE Right 3/4/2021    Procedure: VAC PLACEMENT; SCREW FIXATION RIGHT FOOT FUSION;  Surgeon: Mary Kay lFetcher DPM;  [facility];  Service: Podiatry    [facility] [address] Right 2/22/2021    Procedure: AMPUTATION TRANSMETATARSAL (TMA), REMOVAL NAIL IM T2 ICF HARDWARE;  Surgeon: Mary Kay Fletcher DPM;  [facility];  Service: Podiatry    LA FUSION FOOT BONES,MIDTARSAL,OSTEOTMY Right 2/12/2021    Procedure: foot ARTHRODESIS/FUSION;  Surgeon: Mary Kay Fletcher DPM;  [facility];  Service: Podiatry    LA LENGTH/SHORT LEG/ANKL TENDON,SINGLE Right 2/12/2021    Procedure: LENGTHEN TIBIAL TENDON, TRANS HEEL CORD;  Surgeon: Mary Kay Flecther DPM;  [facility];  Service: Podiatry    LA MUSCLE-SKIN FLAP,TRUNK Right 4/12/2021    Procedure: RECONSTRUCTION MICROSURGICAL W/ FREE FLAP;  Surgeon: Ya Bernstein MD;  [facility];  Service: Plastics    LA MUSCLE-SKIN FLAP,TRUNK Right 4/12/2021    Procedure: TAKEBACK RECONSTRUCTION MICROSURGICAL W/ FREE FLAP;  Surgeon: Ya Bernstein MD;  [facility];  Service: Plastics    LA MUSCLE-SKIN FLAP,TRUNK Right 4/13/2021    Procedure: RECONSTRUCTION MICROSURGICAL W/ FREE FLAP, RE EXPLORATION, MICRO VASCULAR REVISION;  Surgeon: Ya Bernstein MD;  [facility];  Service: Plastics    LA PART [address] BONE METATARSAL HEAD,EA Right 12/29/2020    Procedure: EXCISION EXOSTOSIS;  Surgeon: Mary Kay Fletcher DONOVAN;  [facility];  Service: Tatyana Lind SURG WND EXTEN/COMPLIC Right 14/58/5821    Procedure: PRIMARY DELAYED CLOSURE;  Surgeon: Margoth Cervantes DPM;  [facility];  Service: Podiatry    TOE AMPUTATION Right     partial great toe    TONSILLECTOMY      VAC DRESSING APPLICATION Right 9/9/2036    Procedure: APPLICATION VAC DRESSING EXTREMITY;  Surgeon: Erroll Skiff, MD;  [facility];  Service: Plastics    [address] Right 3/1/2021    Procedure: DEBRIDEMENT LOWER EXTREMITY ([facility] OUT); Surgeon: Erroll Skiff, MD;  [facility];  Service: Plastics    WOUND DEBRIDEMENT Right 4/7/2021    Procedure: DEBRIDEMENT RIGHT FOOT;  Surgeon: Erroll Skiff, MD;  [facility];  Service: Plastics    WOUND DEBRIDEMENT Right 4/12/2021    Procedure: DEBRIDEMENT LOWER EXTREMITY [facility] OUT); Surgeon: Erroll Skiff, MD;  [facility];  Service: Plastics         04/17/21 1036   PT Last Visit   PT Visit Date 04/17/21   Note Type   Note type Evaluation   Pain Assessment   Pain Assessment Tool Pain Assessment not indicated - pt denies pain   Pain Score No Pain   Home Living   Type of Home House  (modular home )   Home Layout One level;Ramped entrance   [address]; Wheelchair-manual   Additional Comments lives w/ spouse- unclear is 24/7 care; pt has hx of [address] for PT and wound care);  Hx of recent rehab at [facility]    Prior Function   Level of [place] Needs assistance with IADLs   Lives With Spouse   Receives Help From Family;Home health   ADL Assistance Needs assistance   IADLs Needs assistance   Falls in the last 6 months 1 to 4   Vocational Unemployed   Restrictions/Precautions   Weight Bearing Precautions Per Order Yes  (STRICT ELEVATION/ BED REST PER PLASTICS(SEE ASSESSMENT))   RLE Weight Bearing Per Order NWB   LLE Weight Bearing Per Order WBAT   Braces or Orthoses   (NONE )   Other Precautions Pain;Multiple lines; Bed Alarm;Cognitive  (STRICT ELEVATION AND BED REST PER PLASTICS- )   General   Additional Pertinent History PT CONSULTED FOR EVAL AND INITIATEION OF BEDLEVEL THHEREX- D/C PLANNING RECS FOLLOWING PROLONGED BEDREST > 10 DAYS FOLLOWING PROCEDURE    Family/Caregiver Present No   Cognition   Overall Cognitive Status Impaired   Arousal/Participation Alert   Orientation Level Oriented to person   Following Commands Follows one step commands without difficulty   RUE Assessment   RUE Assessment WFL   LUE Assessment   LUE Assessment WFL   RLE Assessment   RLE Assessment   (DEFERRED- ELEVATED ON PILLOWS- pt able to lift and adjust )   LLE Assessment   LLE Assessment X   Strength LLE   L Hip Flexion 3/5  ("stiffness" per pt )   L Knee Flexion 3/5   L Knee Extension 3+/5   L Ankle Dorsiflexion 3-/5   L Ankle Plantar Flexion 3-/5   Light Touch   RLE Light Touch   (deferred )   LLE Light Touch Grossly intact   Bed Mobility   Rolling R 4  Minimal assistance   Rolling L 4  Minimal assistance   Supine to Sit Unable to assess   Additional Comments pt able to partially bridge using LLE and B/L UEs    Endurance Deficit   Endurance Deficit Yes   Endurance Deficit Description gross weakness and deconditioning    Activity Tolerance   Activity Tolerance Treatment limited secondary to medical complications (Comment)  (elevation and bedrest orders following R LE surgery )   Medical Staff Made Aware yes- RN- [facility]   Nurse Made Aware yes   Assessment   Prognosis Fair   Problem List Decreased strength;Decreased range of motion;Decreased endurance;Decreased mobility; Decreased cognition; Impaired judgement;Decreased safety awareness; Impaired tone;Decreased skin integrity;Orthopedic restrictions;Pain   Assessment Pt is 73 y o  male  W/ hx of B/L foot wounds and hx of TMA R seen for PT evaluation s/p admit [facility] 4[facility]7/2021      Pt presenting for elective free flap to R foot w/ plastic surgery; however was cx 2* afib w/ RVR and hypotension initially  Cardiology was following and pt eventually underwent  DEBRIDEMENT LOWER EXTREMITY ([address] OUT) (Right) NEURECTOMY CUTANEOUS BRANCH SUPERFICIAL PERONEAL NERVE (RIGHT) RECONSTRUCTION MICROSURGICAL W/ FREE ANTEROLATERAL THIGH FLAP (Right) FLAP EVALUATION WITH ICG LYMPHANGIOGRAPHY on 4/12/21  Pt w/ strict orders for NWB and elevation to R LE at all times  - PT contacted for bedlevel eval and initiation of therex- will require re-eval and updated goals when cleared for OOB and transfers Pt   has a past medical history of Arthritis, Atrial fibrillation ([facility] ), Benign prostate hyperplasia (04/2002), Cellulitis, Colon polyp (2006), Diabetes mellitus ([facility] ), Hearing aid worn, Hearing loss, History of clubfoot, History of pneumonia, History of TIA (transient ischemic attack) (04/25/2017), Hyperlipidemia (5/15/2014), Hypertension, Infectious viral hepatitis, Irregular heart beat, Neuropathy, Osteomyelitis ([facility] ), Right middle lobe pulmonary nodule (11/6/2018), Seasickness, Teeth missing, Type 2 diabetes mellitus with diabetic neuropathy ([facility] ) (11/6/2018), Wears glasses, and Wound, open  Personal factors affecting pt at time of IE include: confusion; recent hospitalizations; limited baseline function; WBS restrictions; nonhealing wounds/ infection past experience, inability to perform IADLs, inability to perform ADLs, inability to ambulate/ negotiate household distances, limited insight into impairments    Due to acute medical issues, WBS restrictions; potential for functional decline and strength deficits from prolonged bedrest  ongoing medical management for primary dx; pain, decreased activity tolerance compared to baseline, increased assistance needed from caregiver at current time, continuous telemetry monitoring, continuous monitoring of R LE;  multiple lines, decline in overall functional mobility status; health management issues; note unstable clinical picture (high complexity)   Prior to admission, pt was living 1 story modular home  Pt lives with his wife Nikky Jhaveri  Pt has 0 JAMEY- and 1 handicapped ramp to get into the home  Pt's bedroom is located in the front, as well as a accessible bathroom close by  Pt's primary caregiver agency is [employer] who would come M,W, and F and provide a visiting RN and PT/OT has hx of    Goals   Patient Goals to be able to move    STG Expiration Date 05/01/21   Short Term Goal #1 In 14 days pt will complete: 1) Bed mobility including rolling and repositioning w/ S to decrease caregiver burden  2) ImproveL LE strength grades by 1 to increase independence w/ transfers and gait when surgical site heals 3) PT to see for completion of functional assessment when activity status upgraded and pt cleared by MD for OOB  7) LE HEP independently  8) PT for ongoing pt and family education; DME needs and D/C planning to promote highest level of function in least restrictive environment  PT Treatment Day 0   Plan   Treatment/Interventions LE strengthening/ROM; Therapeutic exercise; Endurance training;Patient/family training;Equipment eval/education; Bed mobility;Continued evaluation;Spoke to nursing   PT Frequency 2-3x/wk   Recommendation   PT Discharge Recommendation Post acute rehabilitation services  (see assessment for details )   AM-PAC Basic Mobility Inpatient   Turning in Bed Without Bedrails 3   Lying on Back to Sitting on Edge of Flat Bed 1  (unable )   Moving Bed to Chair 1   Standing Up From Chair 1   Walk in Room 1   Climb 3-5 Stairs 1   Basic Mobility Inpatient Raw Score 8   Turning Head Towards Sound 4   Follow Simple Instructions 4   Low Function Basic Mobility Raw Score 16   Low Function Basic Mobility Standardized Score 25 72   Pt is 73 y o  male  W/ hx of B/L foot wounds and hx of TMA R seen for PT evaluation s/p admit to [facility] on 4/7/2021      Pt presenting for elective free flap to R foot w/ plastic surgery; however was cx 2* afib w/ RVR and hypotension initially  Cardiology was following and pt eventually underwent  DEBRIDEMENT LOWER EXTREMITY ([address] OUT) (Right) NEURECTOMY CUTANEOUS BRANCH SUPERFICIAL PERONEAL NERVE (RIGHT) RECONSTRUCTION MICROSURGICAL W/ FREE ANTEROLATERAL THIGH FLAP (Right) FLAP EVALUATION WITH ICG LYMPHANGIOGRAPHY on 4/12/21  Pt w/ strict orders for NWB and elevation to R LE at all times  - PT contacted for bedlevel eval and initiation of therex- will require re-eval and updated goals when cleared for OOB and transfers Pt   has a past medical history of Arthritis, Atrial fibrillation ([facility] ), Benign prostate hyperplasia (04/2002), Cellulitis, Colon polyp (2006), Diabetes mellitus ([facility] [address] ), Hearing aid worn, Hearing loss, History of clubfoot, History of pneumonia, History of TIA (transient ischemic attack) (04/25/2017), Hyperlipidemia (5/15/2014), Hypertension, Infectious viral hepatitis, Irregular heart beat, Neuropathy, Osteomyelitis ([facility] [facility] ), Right middle lobe pulmonary nodule (11/6/2018), Seasickness, Teeth missing, Type 2 diabetes mellitus with diabetic neuropathy ([facility] ) (11/6/2018), Wears glasses, and Wound, open  Personal factors affecting pt at time of IE include: confusion; recent hospitalizations; limited baseline function; WBS restrictions; nonhealing wounds/ infection past experience, inability to perform IADLs, inability to perform ADLs, inability to ambulate/ negotiate household distances, limited insight into impairments    Due to acute medical issues, WBS restrictions; potential for functional decline and strength deficits from prolonged bedrest  ongoing medical management for primary dx; pain, decreased activity tolerance compared to baseline, increased assistance needed from caregiver at current time, continuous telemetry monitoring, continuous monitoring of R LE;  multiple lines, decline in overall functional mobility status; health management issues; note unstable clinical picture (high complexity)   Prior to admission, pt was living 1 story modular home  Pt lives with his wife Trevor Go  Pt has 0 JAMEY- and 1 handicapped ramp to get into the home  Pt's bedroom is located in the front, as well as a accessible bathroom close by  Pt's primary caregiver agency is [employer] who would come M,W, and F and provide a visiting RN and PT/OT has hx of [facility] rehab recently  Pt was use RW vs Wc for home mobility- but is somewhat confused and hx is inconsistent and will need to be clarified by CM   Pt presents w/ R LE elevated on 2 pillows; able to actively move BL UEs and LLE in full AROM and partially bridge using LLE and UE- rolls to R and L (partial roll to maintain R LE in elevation  w Sofy and cues  Further mobility and OOB deferred 2* restrictions  Pt presents functioning below baseline and currently w/ overall mobility deficits 2* to: decreased and  LE strength/AROM; limited flexibility;  generalized weakness/ deconditioning; decreased endurance; decreased activity tolerance; decreased coordination; cognitive deficits;  decreased safety awareness; SOB/LEDBETTER; fatigue; impaired safety and judgement; limited insight into current deficits; bed  alarms; multiple lines; WBS and activity restrictions  Pt currently at risk for falls/ skin breakdown  (Please find additional objective findings from PT assessment regarding body systems outlined above ) Pt will continue to benefit from skilled PT interventions on a 2-3 x/ week basis for monitoring and progression of bedlevel HEP  for bedlevel therex and OOB progression as able  PT to sess for OOB assessment when activity status changes  PT recommending d/c to rehab at this time vs home w/ 24/7 care in hospital bed if family can provide all cares  PHYSICAL THERAPY TREATMENT [id number]  PT initiated tx session following eval for initiation and instruction in bedlevel HEP   PT assisted pt in performing lightly manually resisted AROM to B/L UE's in all planes 20x ea w/ R LE maintained in elevation in bed on pillows  Pt tolerated well w/ cues for breathing techniques (not holding breath)  Also completed A/ AAROM to LLE making sure to avoid shearing in all planes HS; ABD / ADD SLR and SAQ  Pt tolerated well w/o c/o pain  Pt was noted to be a bit confused and repeated self at times throughout  Bed alarm intact post session w/ all needs in reach  RN updated post session  PT will follow on a 2-3x/ wkk limited basis for progression and monitoring of bedlevel HEP, pt/ family and staff education completion  PT to see for mobility as able and orders updated       Caryl Peterson, PT

## 2021-04-17 NOTE — PROGRESS NOTES
INTERNAL MEDICINE RESIDENCY PROGRESS NOTE     Name: Cassia Mckeon  Age & Sex: 73 y o  male   MRN: [id number]  Unit/Bed#: [facility] 525-01   Encounter: [id number]  Team: SOD Team B     PATIENT INFORMATION     Name: Cassia Mckeon  Age & Sex: 73 y o  male   MRN: [id number]  Hospital Stay Days: 10    ASSESSMENT/PLAN     Principal Problem:    S/P transmetatarsal amputation of foot, right ([facility] )  Active Problems:    Hyperlipidemia    Type 2 diabetes mellitus with diabetic neuropathy (HCC)    Atrial fibrillation with RVR ([facility] )    Essential hypertension    Acquired absence of other right toe(s) ([facility] )    Deep disruption or dehiscence of operation wound    Ambulatory dysfunction    Diabetic ulcer of right 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, with fat layer exposed ([place] [address] )    Sleep disturbance      Sleep disturbance  Assessment & Plan  Difficulty overnight  Continue melatonin  Primary team has also started valium for anxiety  Diabetic ulcer of right 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, with fat layer exposed [facility])  Assessment & Plan  Lab Results   Component Value Date    HGBA1C 7 9 (H) 03/17/2021       Recent Labs     04/16/21  1632 04/16/21  2109 04/17/21  0755 04/17/21  1046   POCGLU 178* 194* 190* 194*       Blood Sugar Average: Last 72 hrs:  (P) 030 [id number]     Plan    Continue Lantus 15 U QD and increade Lispro to 7u from 5u TID with sliding scale  Continue to monitor      Ambulatory dysfunction  Assessment & Plan  Stable  PT/OT      Essential hypertension  Assessment & Plan  Stble  -continue home lisinopril 40 mg daily  -continue diltiazem 180 mg b i d  Atrial fibrillation with RVR ([facility] [address] )  Assessment & Plan  Echo 2018 - EF 50%, no regional wall abnormalities, mildly dilated LV, RA  Prior EKGs reviewed - chronic afib     Plan  -cardiology is following  -Pt on diltiazem 180 mg b i d   -metoprolol 75 mg q12  -home Eliquis 5 mg b i d    -continue telemetry monitoring    Hyperlipidemia  Assessment & Plan  Plan   Stable  -continue home Lipitor 40 mg daily    * S/P transmetatarsal amputation of foot, right [facility])  Assessment & Plan  Stable  S/p revision with plastic surgery  Antibiotic management per ID  Plastic surgery primary      Disposition:  Will continue to follow-up patient    SUBJECTIVE     Patient seen and examined, has no complaint at this time  No acute events overnight  OBJECTIVE     Vitals:    21 0528 21 0721 21 0939 21 1058   BP:  159/72 (!) 184/91 130/76   BP Location:   Right arm Right arm   Pulse:  (!) 113 (!) 122 88   Resp:  18  18   Temp:  98 2 °F (36 8 °C)     TempSrc:  Oral     SpO2:  100%  100%   Weight: 98 7 kg (217 lb 9 5 oz)      Height:          Temperature:   Temp (24hrs), Av 3 °F (36 8 °C), Min:97 7 °F (36 5 °C), Max:98 6 °F (37 °C)    Temperature: 98 2 °F (36 8 °C)  Intake & Output:  I/O       04/15 0701 -  0700  07 -  0700  07 -  0700    P  O  240 780     I V  (mL/kg) 72 7 (0 7)      Total Intake(mL/kg) 312 7 (2 9) 780 (7 9)     Urine (mL/kg/hr) 2185 (0 8) 1925 (0 8) 600 (0 9)    Drains  0     Total Output 2185 1925 600    Net -1872 3 -7110 -600               Weights:   IBW (Ideal Body Weight): 89 1 kg    Body mass index is 25 8 kg/m²  Weight (last 2 days)     Date/Time   Weight    21 0528   98 7 (217 59)    Weight: different bed at 21 5420            Physical Exam  Vitals signs and nursing note reviewed  Constitutional:       General: He is not in acute distress  Appearance: He is well-developed  He is not diaphoretic  HENT:      Head: Normocephalic and atraumatic  Mouth/Throat:      Mouth: Mucous membranes are moist       Pharynx: No oropharyngeal exudate  Eyes:      General: No scleral icterus  Extraocular Movements: Extraocular movements intact  Neck:      Musculoskeletal: Normal range of motion and neck supple  Vascular: No JVD  Cardiovascular:      Rate and Rhythm: Regular rhythm  Heart sounds: Normal heart sounds  No murmur  No friction rub  No gallop  Pulmonary:      Effort: Pulmonary effort is normal  No respiratory distress  Breath sounds: Normal breath sounds  No stridor  No wheezing or rales  Abdominal:      General: Bowel sounds are normal  There is no distension  Palpations: Abdomen is soft  Tenderness: There is no abdominal tenderness  Musculoskeletal: Normal range of motion  General: No tenderness  Comments: Flap wound clean   Skin:     General: Skin is warm and dry  Findings: No erythema  Neurological:      General: No focal deficit present  Mental Status: He is alert and oriented to person, place, and time  Psychiatric:         Behavior: Behavior normal        LABORATORY DATA     Labs: I have personally reviewed pertinent reports    Results from last 7 days   Lab Units 04/15/21  0448 04/14/21  0630 04/13/21  1412   WBC Thousand/uL 12 54* 12 84* 14 27*   HEMOGLOBIN g/dL 10 3* 9 6* 9 2*   HEMATOCRIT % 32 6* 30 4* 29 6*   PLATELETS Thousands/uL 227 210 218      Results from last 7 days   Lab Units 04/16/21  0552 04/15/21  0809 04/14/21  0449  04/13/21  0033 04/12/21  2324  04/12/21  1449  04/12/21  0008   POTASSIUM mmol/L 4 3 3 8 4 1   < >  --   --    < >  --   --  3 9   CHLORIDE mmol/L 108 108 115*   < >  --   --    < >  --   --  110*   CO2 mmol/L 26 24 23   < >  --   --    < >  --   --  24   CO2, I-STAT mmol/L  --   --   --   --  23 23  --  24   < >  --    BUN mg/dL 14 12 9   < >  --   --    < >  --   --  16   CREATININE mg/dL 0 84 0 82 0 81   < >  --   --    < >  --   --  0 98   CALCIUM mg/dL 9 0 8 4 8 2*   < >  --   --    < >  --   --  9 0   ALK PHOS U/L  --   --   --   --   --   --   --   --   --  111   ALT U/L  --   --   --   --   --   --   --   --   --  59   AST U/L  --   --   --   --   --   --   --   --   --  21   GLUCOSE, ISTAT mg/dl  --   --   --   --  282* 317*  -- 184*   < >  --     < > = values in this interval not displayed  Results from last 7 days   Lab Units 04/13/21  1412 04/13/21  0555 04/12/21  1947   MAGNESIUM mg/dL 2 2 1 9 2 0     Results from last 7 days   Lab Units 04/13/21  1412 04/13/21  0555 04/12/21 1947   PHOSPHORUS mg/dL 2 7 1 9* 2 5      Results from last 7 days   Lab Units 04/15/21  0448 04/14/21  2329 04/14/21  1717  04/13/21  1412 04/12/21 1947   INR   --   --   --   --  1 32* 1 25*   PTT seconds 64* 75* 76*   < > 30 34    < > = values in this interval not displayed  IMAGING & DIAGNOSTIC TESTING     Radiology Results: I have personally reviewed pertinent reports  No results found  Other Diagnostic Testing: I have personally reviewed pertinent reports      ACTIVE MEDICATIONS     Current Facility-Administered Medications   Medication Dose Route Frequency    acetaminophen (TYLENOL) tablet 650 mg  650 mg Oral Q4H PRN    apixaban (ELIQUIS) tablet 5 mg  5 mg Oral BID    aspirin tablet 325 mg  325 mg Oral Daily    atorvastatin (LIPITOR) tablet 40 mg  40 mg Oral Daily With Dinner    bacitracin topical ointment 1 large application  1 large application Topical BID    diazepam (VALIUM) tablet 5 mg  5 mg Oral Q6H PRN    diltiazem (CARDIZEM) tablet 180 mg  180 mg Oral BID    docusate sodium (COLACE) capsule 100 mg  100 mg Oral BID    doxycycline hyclate (VIBRAMYCIN) capsule 100 mg  100 mg Oral Q12H [address]    gabapentin (NEURONTIN) capsule 100 mg  100 mg Oral Daily    HYDROmorphone (DILAUDID) injection 0 5 mg  0 5 mg Intravenous Q2H PRN    insulin glargine (LANTUS) subcutaneous injection 15 Units 0 15 mL  15 Units Subcutaneous QAM    insulin lispro (HumaLOG) 100 units/mL subcutaneous injection 1-6 Units  1-6 Units Subcutaneous 4x Daily (AC & HS)    insulin lispro (HumaLOG) 100 units/mL subcutaneous injection 7 Units  7 Units Subcutaneous TID With Meals    lisinopril (ZESTRIL) tablet 40 mg  40 mg Oral Daily    melatonin tablet 3 mg  3 mg Oral HS    metoprolol succinate (TOPROL-XL) 24 hr tablet 75 mg  75 mg Oral BID    ondansetron (ZOFRAN) injection 4 mg  4 mg Intravenous Q6H PRN    oxyCODONE (ROXICODONE) IR tablet 10 mg  10 mg Oral Q4H PRN    oxyCODONE (ROXICODONE) IR tablet 5 mg  5 mg Oral Q4H PRN    QUEtiapine (SEROquel) tablet 12 5 mg  12 5 mg Oral QAM    And    QUEtiapine (SEROquel) tablet 25 mg  25 mg Oral HS    senna-docusate sodium (SENOKOT S) 8 6-50 mg per tablet 1 tablet  1 tablet Oral HS       VTE Pharmacologic Prophylaxis:  Eliquis      Portions of the record may have been created with voice recognition software  Occasional wrong word or "sound a like" substitutions may have occurred due to the inherent limitations of voice recognition software    Read the chart carefully and recognize, using context, where substitutions have occurred   ==  Juan Alcala, [address] [facility]  Internal Medicine Residency PGY-2

## 2021-04-17 NOTE — PLAN OF CARE
Problem: Potential for Falls  Goal: Patient will remain free of falls  Description: INTERVENTIONS:  - Assess patient frequently for physical needs  -  Identify cognitive and physical deficits and behaviors that affect risk of falls    -  [place] fall precautions as indicated by assessment   - Educate patient/family on patient safety including physical limitations  - Instruct patient to call for assistance with activity based on assessment  - Modify environment to reduce risk of injury  - Consider OT/PT consult to assist with strengthening/mobility  Outcome: Progressing     Problem: PAIN - ADULT  Goal: Verbalizes/displays adequate comfort level or baseline comfort level  Description: Interventions:  - Encourage patient to monitor pain and request assistance  - Assess pain using appropriate pain scale  - Administer analgesics based on type and severity of pain and evaluate response  - Implement non-pharmacological measures as appropriate and evaluate response  - Consider cultural and social influences on pain and pain management  - Notify physician/advanced practitioner if interventions unsuccessful or patient reports new pain  Outcome: Progressing     Problem: INFECTION - ADULT  Goal: Absence or prevention of progression during hospitalization  Description: INTERVENTIONS:  - Assess and monitor for signs and symptoms of infection  - Monitor lab/diagnostic results  - Monitor all insertion sites, i e  indwelling lines, tubes, and drains  - Monitor endotracheal if appropriate and nasal secretions for changes in amount and color  - [place] appropriate cooling/warming therapies per order  - Administer medications as ordered  - Instruct and encourage patient and family to use good hand hygiene technique  - Identify and instruct in appropriate isolation precautions for identified infection/condition  Outcome: Progressing     Problem: SAFETY ADULT  Goal: Patient will remain free of falls  Description: INTERVENTIONS:  - Assess patient frequently for physical needs  -  Identify cognitive and physical deficits and behaviors that affect risk of falls    -  [place] fall precautions as indicated by assessment   - Educate patient/family on patient safety including physical limitations  - Instruct patient to call for assistance with activity based on assessment  - Modify environment to reduce risk of injury  - Consider OT/PT consult to assist with strengthening/mobility  Outcome: Progressing  Goal: Maintain or return to baseline ADL function  Description: INTERVENTIONS:  -  Assess patient's ability to carry out ADLs; assess patient's baseline for ADL function and identify physical deficits which impact ability to perform ADLs (bathing, care of mouth/teeth, toileting, grooming, dressing, etc )  - Assess/evaluate cause of self-care deficits   - Assess range of motion  - Assess patient's mobility; develop plan if impaired  - Assess patient's need for assistive devices and provide as appropriate  - Encourage maximum independence but intervene and supervise when necessary  - Involve family in performance of ADLs  - Assess for home care needs following discharge   - Consider OT consult to assist with ADL evaluation and planning for discharge  - Provide patient education as appropriate  Outcome: Progressing  Goal: Maintain or return mobility status to optimal level  Description: INTERVENTIONS:  - Assess patient's baseline mobility status (ambulation, transfers, stairs, etc )    - Identify cognitive and physical deficits and behaviors that affect mobility  - Identify mobility aids required to assist with transfers and/or ambulation (gait belt, sit-to-stand, lift, walker, cane, etc )  - [place] fall precautions as indicated by assessment  - Record patient progress and toleration of activity level on Mobility SBAR; progress patient to next Phase/Stage  - Instruct patient to call for assistance with activity based on assessment  - Consider rehabilitation consult to assist with strengthening/weightbearing, etc   Outcome: Progressing     Problem: DISCHARGE PLANNING  Goal: Discharge to home or other facility with appropriate resources  Description: INTERVENTIONS:  - Identify barriers to discharge w/patient and caregiver  - Arrange for needed discharge resources and transportation as appropriate  - Identify discharge learning needs (meds, wound care, etc )  - Arrange for interpretive services to assist at discharge as needed  - Refer to Case Management Department for coordinating discharge planning if the patient needs post-hospital services based on physician/advanced practitioner order or complex needs related to functional status, cognitive ability, or social support system  Outcome: Progressing     Problem: Knowledge Deficit  Goal: Patient/family/caregiver demonstrates understanding of disease process, treatment plan, medications, and discharge instructions  Description: Complete learning assessment and assess knowledge base  Interventions:  - Provide teaching at level of understanding  - Provide teaching via preferred learning methods  Outcome: Progressing     Problem: Nutrition/Hydration-ADULT  Goal: Nutrient/Hydration intake appropriate for improving, restoring or maintaining nutritional needs  Description: Monitor and assess patient's nutrition/hydration status for malnutrition  Collaborate with interdisciplinary team and initiate plan and interventions as ordered  Monitor patient's weight and dietary intake as ordered or per policy  Utilize nutrition screening tool and intervene as necessary  Determine patient's food preferences and provide high-protein, high-caloric foods as appropriate       INTERVENTIONS:  - Monitor oral intake, urinary output, labs, and treatment plans  - Assess nutrition and hydration status and recommend course of action  - Evaluate amount of meals eaten  - Assist patient with eating if necessary   - Allow adequate time for meals  - Recommend/ encourage appropriate diets, oral nutritional supplements, and vitamin/mineral supplements  - Order, calculate, and assess calorie counts as needed  - Recommend, monitor, and adjust tube feedings and TPN/PPN based on assessed needs  - Assess need for intravenous fluids  - Provide specific nutrition/hydration education as appropriate  - Include patient/family/caregiver in decisions related to nutrition  Outcome: Progressing     Problem: Prexisting or High Potential for Compromised Skin Integrity  Goal: Skin integrity is maintained or improved  Description: INTERVENTIONS:  - Identify patients at risk for skin breakdown  - Assess and monitor skin integrity  - Assess and monitor nutrition and hydration status  - Monitor labs   - Assess for incontinence   - Turn and reposition patient  - Assist with mobility/ambulation  - Relieve pressure over bony prominences  - Avoid friction and shearing  - Provide appropriate hygiene as needed including keeping skin clean and dry  - Evaluate need for skin moisturizer/barrier cream  - Collaborate with interdisciplinary team   - Patient/family teaching  - Consider wound care consult   Outcome: Progressing     Problem: CONFUSION/THOUGHT DISTURBANCE  Goal: Thought disturbances (confusion, delirium, depression, dementia or psychosis) are managed to maintain or return to baseline mental status and functional level  Description: INTERVENTIONS:  - Assess for possible contributors to  thought disturbance, including but not limited to medications, infection, impaired vision or hearing, underlying metabolic abnormalities, dehydration, respiratory compromise,  psychiatric diagnoses and notify attending PHYSICAN/AP  - Monitor and intervene to maintain adequate nutrition, hydration, elimination, sleep and activity  - Decrease environmental stimuli, including noise as appropriate  - Provide frequent contacts to provide refocusing, direction and reassurance as needed  Approach patient calmly with eye contact and at their level    - [place] high risk fall precautions, aspiration precautions and other safety measures, as indicated  - If delirium suspected, notify physician/AP of change in condition and request immediate in-person evaluation  - Pursue consults as appropriate including Geriatric (campus dependent), OT for cognitive evaluation/activity planning, psychiatric, pastoral care, etc   Outcome: Progressing

## 2021-04-17 NOTE — PLAN OF CARE
Problem: PHYSICAL THERAPY ADULT  Goal: Performs mobility at highest level of function for planned discharge setting  See evaluation for individualized goals  Description: Treatment/Interventions: LE strengthening/ROM, Therapeutic exercise, Endurance training, Patient/family training, Equipment eval/education, Bed mobility, Continued evaluation, Spoke to nursing          See flowsheet documentation for full assessment, interventions and recommendations  Note: Prognosis: Fair  Problem List: Decreased strength, Decreased range of motion, Decreased endurance, Decreased mobility, Decreased cognition, Impaired judgement, Decreased safety awareness, Impaired tone, Decreased skin integrity, Orthopedic restrictions, Pain  Assessment: Pt is 73 y o  male  W/ hx of B/L foot wounds and hx of TMA R seen for PT evaluation s/p admit [facility] on 4/7/2021  Pt presenting for elective free flap to R foot w/ plastic surgery; however was cx 2* afib w/ RVR and hypotension initially  Cardiology was following and pt eventually underwent  DEBRIDEMENT LOWER EXTREMITY ([address] OUT) (Right) NEURECTOMY CUTANEOUS BRANCH SUPERFICIAL PERONEAL NERVE (RIGHT) RECONSTRUCTION MICROSURGICAL W/ FREE ANTEROLATERAL THIGH FLAP (Right) FLAP EVALUATION WITH ICG LYMPHANGIOGRAPHY on 4/12/21   Pt w/ strict orders for NWB and elevation to R LE at all times  - PT contacted for bedlevel eval and initiation of therex- will require re-eval and updated goals when cleared for OOB and transfers Pt   has a past medical history of Arthritis, Atrial fibrillation ([address] ), Benign prostate hyperplasia (04/2002), Cellulitis, Colon polyp (2006), Diabetes mellitus ([address] ), Hearing aid worn, Hearing loss, History of clubfoot, History of pneumonia, History of TIA (transient ischemic attack) (04/25/2017), Hyperlipidemia (5/15/2014), Hypertension, Infectious viral hepatitis, Irregular heart beat, Neuropathy, Osteomyelitis ([address] ), Right middle lobe pulmonary nodule (11/6/2018), Seasickness, Teeth missing, Type 2 diabetes mellitus with diabetic neuropathy ([facility] [address] ) (11/6/2018), Wears glasses, and Wound, open  Personal factors affecting pt at time of IE include: confusion; recent hospitalizations; limited baseline function; WBS restrictions; nonhealing wounds/ infection past experience, inability to perform IADLs, inability to perform ADLs, inability to ambulate/ negotiate household distances, limited insight into impairments  Due to acute medical issues, WBS restrictions; potential for functional decline and strength deficits from prolonged bedrest  ongoing medical management for primary dx; pain, decreased activity tolerance compared to baseline, increased assistance needed from caregiver at current time, continuous telemetry monitoring, continuous monitoring of R LE;  multiple lines, decline in overall functional mobility status; health management issues; note unstable clinical picture (high complexity)   Prior to admission, pt was living 1 story modular home  Pt lives with his wife Arnold Tam  Pt has 0 JAMEY- and 1 handicapped ramp to get into the home  Pt's bedroom is located in the front, as well as a accessible bathroom close by  Pt's primary caregiver agency is [employer] who would come M,W, and F and provide a visiting RN and PT/OT has hx of            PT Discharge Recommendation: Post acute rehabilitation services(see assessment for details )          See flowsheet documentation for full assessment

## 2021-04-17 NOTE — PROGRESS NOTES
Plastic Surgery Attending     Pt seen and examined  Patient overall feels better, afraid to fall asleep to avoid moving his foot  Denies pain  /76 (BP Location: Right arm)   Pulse 88   Temp 98 2 °F (36 8 °C) (Oral)   Resp 18   Ht 6' 5" (1 956 m)   Wt 98 7 kg (217 lb 9 5 oz) Comment: different bed  SpO2 100%   BMI 25 80 kg/m²     General: AO x3, NAD   Chest: symmetric chest rise   RLE: donor site c/d/i, distal flap warm, viable and well perfused, Vioptix and flow  stable  A - Doing better   P   - Continue Bedrest for now, ok to move to commode with leg elevated at all times  - pain well controlled with current regiment  - Appreciate assistance of Gerontology, Cards, ID and IM  - continue flap checks every 4 hours, daily aspirin   - continue daily graft care   - Dispo: will likely require facility for discharge end of the week       [facility] [id number] [facility]   [address]   [address]   [place], [place] [address] [place]   Office: [phone]

## 2021-04-17 NOTE — PROGRESS NOTES
Progress Note - Infectious Disease   Javy Hand  73 y o  male MRN: [id number]  Unit/Bed#: [facility] 525-01 Encounter: [id number]      Impression/Recommendations:  1  Possible residual osteomyelitis of right foot   Patient is status post screw fixation right foot on , with operative bone scraping culture growing Enterobacter   Patient has been on p o  Doxycycline since and is tolerating it well  Yong Chance, he had a persistent open foot wound   Patient is now status post flap closure of right foot   With successful flap closure, there is no further bone exposed   The best course of action at this point is to have patient continue antibiotic for 6 weeks after flap closure  Continue p o  Doxycycline  Treat x6 weeks after flap closure, through       2  Open right foot wound   Patient is status post flap closure, requiring return to OR twice for revision due to congestion   Wound is clean, without evidence of cellulitis  Wound care per Plastic surgery      3  Status post screw fixation right foot on       4  Status post failed right foot reconstruction, requiring TMA   All hardware prior to [facility] were removed  Yong Chance, as in above, patient is status post screw fixation of right foot after TMA      5  DM, somewhat poorly controlled, with elevated hemoglobin A1c  Management per Medicine Service      Discussed with patient and his son in detail regarding the above plan        Antibiotics:  Doxycycline  POD # 5     Subjective:  Patient is awake and alert   Confusion improving  Minimal pain in right foot  Temperature stays down   No chills    He is tolerating antibiotic well   No nausea, vomiting or diarrhea      Objective:  Vitals:  Temp:  [97 7 °F (36 5 °C)-98 6 °F (37 °C)] 98 2 °F (36 8 °C)  HR:  [] 88  Resp:  [18] 18  BP: (119-184)/(68-91) 130/76  SpO2:  [94 %-100 %] 100 %  Temp (24hrs), Av 3 °F (36 8 °C), Min:97 7 °F (36 5 °C), Max:98 6 °F (37 °C)  Current: Temperature: 98 2 °F (36 8 °C)    Physical Exam:     General: Awake, alert, cooperative, no distress  Less confusion  Neck:  Supple  No mass  No lymphadenopathy  Lungs: Expansion symmetric, no rales, no wheezing, respirations unlabored  Heart:  Regular rate and rhythm, S1 and S2 normal, no murmur  Abdomen: Soft, nondistended, non-tender, bowel sounds active all four quadrants, no masses, no organomegaly  Extremities: Stable foot edema  Incision healing well  No purulence  No erythema/warmth  No tenderness  Skin:  No rash  Neuro: Moves all extremities  Invasive Devices     Peripheral Intravenous Line            Peripheral IV 04/16/21 Left Antecubital 1 day          Drain            Closed/Suction Drain Right Thigh Bulb 15 Fr  5 days    External Urinary Catheter Small 1 day                Labs studies:   I have personally reviewed pertinent labs  Results from last 7 days   Lab Units 04/16/21  0552 04/15/21  0809 04/14/21  0449  04/13/21  0033 04/12/21  2324  04/12/21  1449  04/12/21  0008   POTASSIUM mmol/L 4 3 3 8 4 1   < >  --   --    < >  --   --  3 9   CHLORIDE mmol/L 108 108 115*   < >  --   --    < >  --   --  110*   CO2 mmol/L 26 24 23   < >  --   --    < >  --   --  24   CO2, I-STAT mmol/L  --   --   --   --  23 23  --  24   < >  --    BUN mg/dL 14 12 9   < >  --   --    < >  --   --  16   CREATININE mg/dL 0 84 0 82 0 81   < >  --   --    < >  --   --  0 98   EGFR ml/min/1 73sq m 89 90 91   < >  --   --    < >  --   --  78   GLUCOSE, ISTAT mg/dl  --   --   --   --  282* 317*  --  184*   < >  --    CALCIUM mg/dL 9 0 8 4 8 2*   < >  --   --    < >  --   --  9 0   AST U/L  --   --   --   --   --   --   --   --   --  21   ALT U/L  --   --   --   --   --   --   --   --   --  59   ALK PHOS U/L  --   --   --   --   --   --   --   --   --  111    < > = values in this interval not displayed       Results from last 7 days   Lab Units 04/15/21  0448 04/14/21  0630 04/13/21  1412   WBC Thousand/uL 12 54* 12 84* 14 27* HEMOGLOBIN g/dL 10 3* 9 6* 9 2*   PLATELETS Thousands/uL 227 210 218           Imaging Studies:   I have personally reviewed pertinent imaging study reports and images in PACS  EKG, Pathology, and Other Studies:   I have personally reviewed pertinent reports

## 2021-04-18 LAB
ANION GAP SERPL CALCULATED.3IONS-SCNC: 6 MMOL/L (ref 4–13)
BUN SERPL-MCNC: 13 MG/DL (ref 5–25)
CALCIUM SERPL-MCNC: 8.6 MG/DL (ref 8.3–10.1)
CHLORIDE SERPL-SCNC: 113 MMOL/L (ref 100–108)
CO2 SERPL-SCNC: 16 MMOL/L (ref 21–32)
CREAT SERPL-MCNC: 0.79 MG/DL (ref 0.6–1.3)
GFR SERPL CREATININE-BSD FRML MDRD: 92 ML/MIN/1.73SQ M
GLUCOSE SERPL-MCNC: 129 MG/DL (ref 65–140)
GLUCOSE SERPL-MCNC: 149 MG/DL (ref 65–140)
GLUCOSE SERPL-MCNC: 164 MG/DL (ref 65–140)
GLUCOSE SERPL-MCNC: 168 MG/DL (ref 65–140)
GLUCOSE SERPL-MCNC: 171 MG/DL (ref 65–140)
POTASSIUM SERPL-SCNC: 4.6 MMOL/L (ref 3.5–5.3)
SODIUM SERPL-SCNC: 135 MMOL/L (ref 136–145)

## 2021-04-18 PROCEDURE — 82948 REAGENT STRIP/BLOOD GLUCOSE: CPT

## 2021-04-18 PROCEDURE — 99024 POSTOP FOLLOW-UP VISIT: CPT | Performed by: PLASTIC SURGERY

## 2021-04-18 PROCEDURE — 99232 SBSQ HOSP IP/OBS MODERATE 35: CPT | Performed by: INTERNAL MEDICINE

## 2021-04-18 PROCEDURE — 80048 BASIC METABOLIC PNL TOTAL CA: CPT | Performed by: STUDENT IN AN ORGANIZED HEALTH CARE EDUCATION/TRAINING PROGRAM

## 2021-04-18 RX ADMIN — ATORVASTATIN CALCIUM 40 MG: 40 TABLET, FILM COATED ORAL at 17:58

## 2021-04-18 RX ADMIN — QUETIAPINE FUMARATE 25 MG: 25 TABLET ORAL at 21:33

## 2021-04-18 RX ADMIN — LISINOPRIL 40 MG: 20 TABLET ORAL at 09:23

## 2021-04-18 RX ADMIN — INSULIN GLARGINE 15 UNITS: 100 INJECTION, SOLUTION SUBCUTANEOUS at 09:34

## 2021-04-18 RX ADMIN — MELATONIN TAB 3 MG 3 MG: 3 TAB at 21:33

## 2021-04-18 RX ADMIN — DOXYCYCLINE 100 MG: 100 CAPSULE ORAL at 09:23

## 2021-04-18 RX ADMIN — BACITRACIN 1 LARGE APPLICATION: 500 OINTMENT TOPICAL at 12:21

## 2021-04-18 RX ADMIN — GABAPENTIN 100 MG: 100 CAPSULE ORAL at 09:23

## 2021-04-18 RX ADMIN — DOCUSATE SODIUM 100 MG: 100 CAPSULE, LIQUID FILLED ORAL at 17:58

## 2021-04-18 RX ADMIN — QUETIAPINE FUMARATE 12.5 MG: 25 TABLET ORAL at 09:23

## 2021-04-18 RX ADMIN — DOCUSATE SODIUM AND SENNOSIDES 1 TABLET: 8.6; 5 TABLET ORAL at 21:33

## 2021-04-18 RX ADMIN — DOCUSATE SODIUM 100 MG: 100 CAPSULE, LIQUID FILLED ORAL at 09:23

## 2021-04-18 RX ADMIN — DOXYCYCLINE 100 MG: 100 CAPSULE ORAL at 21:33

## 2021-04-18 RX ADMIN — BACITRACIN 1 LARGE APPLICATION: 500 OINTMENT TOPICAL at 18:00

## 2021-04-18 RX ADMIN — APIXABAN 5 MG: 5 TABLET, FILM COATED ORAL at 17:58

## 2021-04-18 RX ADMIN — INSULIN LISPRO 1 UNITS: 100 INJECTION, SOLUTION INTRAVENOUS; SUBCUTANEOUS at 17:59

## 2021-04-18 RX ADMIN — METOPROLOL SUCCINATE 75 MG: 50 TABLET, EXTENDED RELEASE ORAL at 09:23

## 2021-04-18 RX ADMIN — DILTIAZEM HYDROCHLORIDE 180 MG: 60 TABLET, FILM COATED ORAL at 17:58

## 2021-04-18 RX ADMIN — DILTIAZEM HYDROCHLORIDE 180 MG: 60 TABLET, FILM COATED ORAL at 09:22

## 2021-04-18 RX ADMIN — INSULIN LISPRO 1 UNITS: 100 INJECTION, SOLUTION INTRAVENOUS; SUBCUTANEOUS at 12:20

## 2021-04-18 RX ADMIN — ASPIRIN 325 MG ORAL TABLET 325 MG: 325 PILL ORAL at 09:23

## 2021-04-18 RX ADMIN — INSULIN LISPRO 1 UNITS: 100 INJECTION, SOLUTION INTRAVENOUS; SUBCUTANEOUS at 21:37

## 2021-04-18 RX ADMIN — APIXABAN 5 MG: 5 TABLET, FILM COATED ORAL at 09:23

## 2021-04-18 RX ADMIN — METOPROLOL SUCCINATE 75 MG: 50 TABLET, EXTENDED RELEASE ORAL at 21:33

## 2021-04-18 NOTE — PROGRESS NOTES
Progress Note - Infectious Disease   Niraj Romero  73 y o  male MRN: [id number]  Unit/Bed#: [facility] 525-01 Encounter: [id number]      Impression/Recommendations:  1  Possible residual osteomyelitis of right foot   Patient is status post screw fixation right foot on , with operative bone scraping culture growing Enterobacter   Patient has been on p o  Doxycycline since and is tolerating it well  Mireya Jensen, he had a persistent open foot wound   Patient is now status post flap closure of right foot   With successful flap closure, there is no further bone exposed   The best course of action at this point is to have patient continue antibiotic for 6 weeks after flap closure  Continue p o  Doxycycline  Treat x6 weeks after flap closure, through       2  Open right foot wound   Patient is status post flap closure, requiring return to OR twice for revision due to congestion   Wound is clean, without evidence of cellulitis  Wound care per Plastic surgery      3  Status post screw fixation right foot on       4  Status post failed right foot reconstruction, requiring TMA   All hardware prior to TMA were removed   However, as in above, patient is status post screw fixation of right foot after TMA      5  DM, somewhat poorly controlled, with elevated hemoglobin A1c  Management per Medicine Service      Discussed with patient in detail regarding the above plan        Antibiotics:  Doxycycline  POD # 6     Subjective:  Patient is awake and alert   Confusion slowly improving  No pain in right foot  Temperature stays down   No chills    He is tolerating antibiotic well   No nausea, vomiting or diarrhea      Objective:  Vitals:  Temp:  [97 7 °F (36 5 °C)-97 9 °F (36 6 °C)] 97 7 °F (36 5 °C)  HR:  [] 93  Resp:  [16-19] 16  BP: (109-148)/(67-93) 111/68  SpO2:  [98 %-100 %] 99 %  Temp (24hrs), Av 8 °F (36 6 °C), Min:97 7 °F (36 5 °C), Max:97 9 °F (36 6 °C)  Current: Temperature: 97 7 °F (36 5 °C)    Physical Exam:     General: Awake, alert, cooperative, no distress  Neck:  Supple  No mass  No lymphadenopathy  Lungs: Expansion symmetric, no rales, no wheezing, respirations unlabored  Heart:  Regular rate and rhythm, S1 and S2 normal, no murmur  Abdomen: Soft, nondistended, non-tender, bowel sounds active all four quadrants, no masses, no organomegaly  Extremities: Improved foot edema  Incision clean  No drainage  No erythema/warmth  Nontender to palpation  Skin:  No rash  Neuro: Moves all extremities  Invasive Devices     Peripheral Intravenous Line            Peripheral IV 04/16/21 Left Antecubital 2 days          Drain            Closed/Suction Drain Right Thigh Bulb 15 Fr  6 days    External Urinary Catheter Small 2 days                Labs studies:   I have personally reviewed pertinent labs  Results from last 7 days   Lab Units 04/18/21  0442 04/16/21  0552 04/15/21  0809  04/13/21  0033 04/12/21  2324  04/12/21  1449  04/12/21  0008   POTASSIUM mmol/L 4 6 4 3 3 8   < >  --   --    < >  --   --  3 9   CHLORIDE mmol/L 113* 108 108   < >  --   --    < >  --   --  110*   CO2 mmol/L 16* 26 24   < >  --   --    < >  --   --  24   CO2, I-STAT mmol/L  --   --   --   --  23 23  --  24   < >  --    BUN mg/dL 13 14 12   < >  --   --    < >  --   --  16   CREATININE mg/dL 0 79 0 84 0 82   < >  --   --    < >  --   --  0 98   EGFR ml/min/1 73sq m 92 89 90   < >  --   --    < >  --   --  78   GLUCOSE, ISTAT mg/dl  --   --   --   --  282* 317*  --  184*   < >  --    CALCIUM mg/dL 8 6 9 0 8 4   < >  --   --    < >  --   --  9 0   AST U/L  --   --   --   --   --   --   --   --   --  21   ALT U/L  --   --   --   --   --   --   --   --   --  59   ALK PHOS U/L  --   --   --   --   --   --   --   --   --  111    < > = values in this interval not displayed       Results from last 7 days   Lab Units 04/15/21  0448 04/14/21  0630 04/13/21  1412   WBC Thousand/uL 12 54* 12 84* 14 27*   HEMOGLOBIN g/dL 10 3* 9 6* 9 2* PLATELETS Thousands/uL 227 210 218           Imaging Studies:   I have personally reviewed pertinent imaging study reports and images in PACS  EKG, Pathology, and Other Studies:   I have personally reviewed pertinent reports

## 2021-04-18 NOTE — PROGRESS NOTES
Plastic Surgery Attending      Pt seen and examined  Patient overall feels better, afraid to fall asleep to avoid moving his foot  Denies pain  /75   Pulse (!) 114   Temp 97 8 °F (36 6 °C) (Oral)   Resp 19   Ht 6' 5" (1 956 m)   Wt 97 9 kg (215 lb 13 3 oz)   SpO2 99%   BMI 25 59 kg/m²      General: AO x3, NAD   Chest: symmetric chest rise   RLE: donor site c/d/i, distal flap warm, viable and well perfused, Vioptix and flow  stable       A - Doing better   P   - Continue Bedrest for now, ok to move to commode with leg elevated at all times  - pain well controlled with current regiment     - Appreciate assistance of Gerontology, Cards, ID and IM  - continue flap checks every 4 hours, daily aspirin   - continue daily graft care   - Dispo: will likely require facility for discharge end of the week    Fadi 3 MD [address]   [address]   [place], [place] [address] [place]   Office: [phone]

## 2021-04-18 NOTE — PLAN OF CARE
Problem: Potential for Falls  Goal: Patient will remain free of falls  Description: INTERVENTIONS:  - Assess patient frequently for physical needs  -  Identify cognitive and physical deficits and behaviors that affect risk of falls    -  [place] fall precautions as indicated by assessment   - Educate patient/family on patient safety including physical limitations  - Instruct patient to call for assistance with activity based on assessment  - Modify environment to reduce risk of injury  - Consider OT/PT consult to assist with strengthening/mobility  Outcome: Progressing     Problem: PAIN - ADULT  Goal: Verbalizes/displays adequate comfort level or baseline comfort level  Description: Interventions:  - Encourage patient to monitor pain and request assistance  - Assess pain using appropriate pain scale  - Administer analgesics based on type and severity of pain and evaluate response  - Implement non-pharmacological measures as appropriate and evaluate response  - Consider cultural and social influences on pain and pain management  - Notify physician/advanced practitioner if interventions unsuccessful or patient reports new pain  Outcome: Progressing     Problem: INFECTION - ADULT  Goal: Absence or prevention of progression during hospitalization  Description: INTERVENTIONS:  - Assess and monitor for signs and symptoms of infection  - Monitor lab/diagnostic results  - Monitor all insertion sites, i e  indwelling lines, tubes, and drains  - Monitor endotracheal if appropriate and nasal secretions for changes in amount and color  - [place] appropriate cooling/warming therapies per order  - Administer medications as ordered  - Instruct and encourage patient and family to use good hand hygiene technique  - Identify and instruct in appropriate isolation precautions for identified infection/condition  Outcome: Progressing     Problem: SAFETY ADULT  Goal: Patient will remain free of falls  Description: INTERVENTIONS:  - Assess patient frequently for physical needs  -  Identify cognitive and physical deficits and behaviors that affect risk of falls    -  [place] fall precautions as indicated by assessment   - Educate patient/family on patient safety including physical limitations  - Instruct patient to call for assistance with activity based on assessment  - Modify environment to reduce risk of injury  - Consider OT/PT consult to assist with strengthening/mobility  Outcome: Progressing  Goal: Maintain or return to baseline ADL function  Description: INTERVENTIONS:  -  Assess patient's ability to carry out ADLs; assess patient's baseline for ADL function and identify physical deficits which impact ability to perform ADLs (bathing, care of mouth/teeth, toileting, grooming, dressing, etc )  - Assess/evaluate cause of self-care deficits   - Assess range of motion  - Assess patient's mobility; develop plan if impaired  - Assess patient's need for assistive devices and provide as appropriate  - Encourage maximum independence but intervene and supervise when necessary  - Involve family in performance of ADLs  - Assess for home care needs following discharge   - Consider OT consult to assist with ADL evaluation and planning for discharge  - Provide patient education as appropriate  Outcome: Progressing  Goal: Maintain or return mobility status to optimal level  Description: INTERVENTIONS:  - Assess patient's baseline mobility status (ambulation, transfers, stairs, etc )    - Identify cognitive and physical deficits and behaviors that affect mobility  - Identify mobility aids required to assist with transfers and/or ambulation (gait belt, sit-to-stand, lift, walker, cane, etc )  - [place] fall precautions as indicated by assessment  - Record patient progress and toleration of activity level on Mobility SBAR; progress patient to next Phase/Stage  - Instruct patient to call for assistance with activity based on assessment  - Consider rehabilitation consult to assist with strengthening/weightbearing, etc   Outcome: Progressing     Problem: DISCHARGE PLANNING  Goal: Discharge to home or other facility with appropriate resources  Description: INTERVENTIONS:  - Identify barriers to discharge w/patient and caregiver  - Arrange for needed discharge resources and transportation as appropriate  - Identify discharge learning needs (meds, wound care, etc )  - Arrange for interpretive services to assist at discharge as needed  - Refer to Case Management Department for coordinating discharge planning if the patient needs post-hospital services based on physician/advanced practitioner order or complex needs related to functional status, cognitive ability, or social support system  Outcome: Progressing     Problem: Knowledge Deficit  Goal: Patient/family/caregiver demonstrates understanding of disease process, treatment plan, medications, and discharge instructions  Description: Complete learning assessment and assess knowledge base  Interventions:  - Provide teaching at level of understanding  - Provide teaching via preferred learning methods  Outcome: Progressing     Problem: Nutrition/Hydration-ADULT  Goal: Nutrient/Hydration intake appropriate for improving, restoring or maintaining nutritional needs  Description: Monitor and assess patient's nutrition/hydration status for malnutrition  Collaborate with interdisciplinary team and initiate plan and interventions as ordered  Monitor patient's weight and dietary intake as ordered or per policy  Utilize nutrition screening tool and intervene as necessary  Determine patient's food preferences and provide high-protein, high-caloric foods as appropriate       INTERVENTIONS:  - Monitor oral intake, urinary output, labs, and treatment plans  - Assess nutrition and hydration status and recommend course of action  - Evaluate amount of meals eaten  - Assist patient with eating if necessary   - Allow adequate time for meals  - Recommend/ encourage appropriate diets, oral nutritional supplements, and vitamin/mineral supplements  - Order, calculate, and assess calorie counts as needed  - Recommend, monitor, and adjust tube feedings and TPN/PPN based on assessed needs  - Assess need for intravenous fluids  - Provide specific nutrition/hydration education as appropriate  - Include patient/family/caregiver in decisions related to nutrition  Outcome: Progressing     Problem: Prexisting or High Potential for Compromised Skin Integrity  Goal: Skin integrity is maintained or improved  Description: INTERVENTIONS:  - Identify patients at risk for skin breakdown  - Assess and monitor skin integrity  - Assess and monitor nutrition and hydration status  - Monitor labs   - Assess for incontinence   - Turn and reposition patient  - Assist with mobility/ambulation  - Relieve pressure over bony prominences  - Avoid friction and shearing  - Provide appropriate hygiene as needed including keeping skin clean and dry  - Evaluate need for skin moisturizer/barrier cream  - Collaborate with interdisciplinary team   - Patient/family teaching  - Consider wound care consult   Outcome: Progressing     Problem: CONFUSION/THOUGHT DISTURBANCE  Goal: Thought disturbances (confusion, delirium, depression, dementia or psychosis) are managed to maintain or return to baseline mental status and functional level  Description: INTERVENTIONS:  - Assess for possible contributors to  thought disturbance, including but not limited to medications, infection, impaired vision or hearing, underlying metabolic abnormalities, dehydration, respiratory compromise,  psychiatric diagnoses and notify attending PHYSICAN/AP  - Monitor and intervene to maintain adequate nutrition, hydration, elimination, sleep and activity  - Decrease environmental stimuli, including noise as appropriate  - Provide frequent contacts to provide refocusing, direction and reassurance as needed  Approach patient calmly with eye contact and at their level    - [place] high risk fall precautions, aspiration precautions and other safety measures, as indicated  - If delirium suspected, notify physician/AP of change in condition and request immediate in-person evaluation  - Pursue consults as appropriate including Geriatric (campus dependent), OT for cognitive evaluation/activity planning, psychiatric, pastoral care, etc   Outcome: Progressing     Problem: METABOLIC, FLUID AND ELECTROLYTES - ADULT  Goal: Electrolytes maintained within normal limits  Description: INTERVENTIONS:  - Monitor labs and assess patient for signs and symptoms of electrolyte imbalances  - Administer electrolyte replacement as ordered  - Monitor response to electrolyte replacements, including repeat lab results as appropriate  - Instruct patient on fluid and nutrition as appropriate  Outcome: Progressing  Goal: Glucose maintained within target range  Description: INTERVENTIONS:  - Monitor Blood Glucose as ordered  - Assess for signs and symptoms of hyperglycemia and hypoglycemia  - Administer ordered medications to maintain glucose within target range  - Assess nutritional intake and initiate nutrition service referral as needed  Outcome: Progressing     Problem: SKIN/TISSUE INTEGRITY - ADULT  Goal: Skin integrity remains intact  Description: INTERVENTIONS  - Identify patients at risk for skin breakdown  - Assess and monitor skin integrity  - Assess and monitor nutrition and hydration status  - Monitor labs (i e  albumin)  - Assess for incontinence   - Turn and reposition patient  - Assist with mobility/ambulation  - Relieve pressure over bony prominences  - Avoid friction and shearing  - Provide appropriate hygiene as needed including keeping skin clean and dry  - Evaluate need for skin moisturizer/barrier cream  - Collaborate with interdisciplinary team (i e  Nutrition, Rehabilitation, etc )   - Patient/family teaching  Outcome: Progressing  Goal: Incision(s), wounds(s) or drain site(s) healing without S/S of infection  Description: INTERVENTIONS  - Assess and document risk factors for skin impairment   - Assess and document dressing, incision, wound bed, drain sites and surrounding tissue  - Consider nutrition services referral as needed  - Oral mucous membranes remain intact  - Provide patient/ family education  Outcome: Progressing

## 2021-04-19 LAB
GLUCOSE SERPL-MCNC: 144 MG/DL (ref 65–140)
GLUCOSE SERPL-MCNC: 146 MG/DL (ref 65–140)
GLUCOSE SERPL-MCNC: 191 MG/DL (ref 65–140)
GLUCOSE SERPL-MCNC: 253 MG/DL (ref 65–140)

## 2021-04-19 PROCEDURE — 82948 REAGENT STRIP/BLOOD GLUCOSE: CPT

## 2021-04-19 PROCEDURE — 97167 OT EVAL HIGH COMPLEX 60 MIN: CPT

## 2021-04-19 PROCEDURE — NC001 PR NO CHARGE: Performed by: PLASTIC SURGERY

## 2021-04-19 PROCEDURE — 99232 SBSQ HOSP IP/OBS MODERATE 35: CPT | Performed by: INTERNAL MEDICINE

## 2021-04-19 RX ADMIN — DOCUSATE SODIUM AND SENNOSIDES 1 TABLET: 8.6; 5 TABLET ORAL at 21:04

## 2021-04-19 RX ADMIN — BACITRACIN 1 LARGE APPLICATION: 500 OINTMENT TOPICAL at 17:18

## 2021-04-19 RX ADMIN — MELATONIN TAB 3 MG 3 MG: 3 TAB at 21:04

## 2021-04-19 RX ADMIN — GABAPENTIN 100 MG: 100 CAPSULE ORAL at 08:02

## 2021-04-19 RX ADMIN — ATORVASTATIN CALCIUM 40 MG: 40 TABLET, FILM COATED ORAL at 17:14

## 2021-04-19 RX ADMIN — DOCUSATE SODIUM 100 MG: 100 CAPSULE, LIQUID FILLED ORAL at 08:02

## 2021-04-19 RX ADMIN — APIXABAN 5 MG: 5 TABLET, FILM COATED ORAL at 08:01

## 2021-04-19 RX ADMIN — DOCUSATE SODIUM 100 MG: 100 CAPSULE, LIQUID FILLED ORAL at 17:14

## 2021-04-19 RX ADMIN — BACITRACIN 1 LARGE APPLICATION: 500 OINTMENT TOPICAL at 08:08

## 2021-04-19 RX ADMIN — QUETIAPINE FUMARATE 25 MG: 25 TABLET ORAL at 21:04

## 2021-04-19 RX ADMIN — INSULIN LISPRO 3 UNITS: 100 INJECTION, SOLUTION INTRAVENOUS; SUBCUTANEOUS at 11:53

## 2021-04-19 RX ADMIN — APIXABAN 5 MG: 5 TABLET, FILM COATED ORAL at 17:14

## 2021-04-19 RX ADMIN — ASPIRIN 325 MG ORAL TABLET 325 MG: 325 PILL ORAL at 08:02

## 2021-04-19 RX ADMIN — METOPROLOL SUCCINATE 75 MG: 50 TABLET, EXTENDED RELEASE ORAL at 21:04

## 2021-04-19 RX ADMIN — INSULIN GLARGINE 15 UNITS: 100 INJECTION, SOLUTION SUBCUTANEOUS at 08:09

## 2021-04-19 RX ADMIN — LISINOPRIL 40 MG: 20 TABLET ORAL at 08:01

## 2021-04-19 RX ADMIN — METOPROLOL SUCCINATE 75 MG: 50 TABLET, EXTENDED RELEASE ORAL at 08:02

## 2021-04-19 RX ADMIN — DILTIAZEM HYDROCHLORIDE 180 MG: 60 TABLET, FILM COATED ORAL at 08:01

## 2021-04-19 RX ADMIN — DILTIAZEM HYDROCHLORIDE 180 MG: 60 TABLET, FILM COATED ORAL at 17:14

## 2021-04-19 RX ADMIN — INSULIN LISPRO 2 UNITS: 100 INJECTION, SOLUTION INTRAVENOUS; SUBCUTANEOUS at 17:19

## 2021-04-19 RX ADMIN — DOXYCYCLINE 100 MG: 100 CAPSULE ORAL at 08:00

## 2021-04-19 RX ADMIN — QUETIAPINE FUMARATE 12.5 MG: 25 TABLET ORAL at 08:01

## 2021-04-19 RX ADMIN — DOXYCYCLINE 100 MG: 100 CAPSULE ORAL at 21:04

## 2021-04-19 NOTE — PROGRESS NOTES
INTERNAL MEDICINE RESIDENCY PROGRESS NOTE     Name: Milad Dial  Age & Sex: 73 y o  male   MRN: [id number]  Unit/Bed#: [facility] 525-01   Encounter: [id number]  Team: SOD Team B     PATIENT INFORMATION     Name: Milad Dial  Age & Sex: 73 y o  male   MRN: [id number]  Hospital Stay Days: 12    ASSESSMENT/PLAN     Principal Problem:    S/P transmetatarsal amputation of foot, right ([facility] )  Active Problems:    Hyperlipidemia    Type 2 diabetes mellitus with diabetic neuropathy (HCC)    Atrial fibrillation with RVR ([facility] )    Essential hypertension    Acquired absence of other right toe(s) ([facility] )    Deep disruption or dehiscence of operation wound    Ambulatory dysfunction    Diabetic ulcer of right 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, with fat layer exposed ([facility] )    Sleep disturbance      Sleep disturbance  Assessment & Plan  Difficulty overnight  Continue melatonin  Primary team has also started valium for anxiety  Diabetic ulcer of right 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, with fat layer exposed [facility])  Assessment & Plan  Lab Results   Component Value Date    HGBA1C 7 9 (H) 03/17/2021       Recent Labs     04/16/21  1632 04/16/21  2109 04/17/21  0755 04/17/21  1046   POCGLU 178* 194* 190* 194*       Blood Sugar Average: Last 72 hrs:  (P) 424 [id number]     Plan    Continue Lantus 15 U QD and increade Lispro to 7u from 5u TID with sliding scale  Continue to monitor  Discharge instructions:  Please stop inpatient insulin and restart home metformin, Januvia and glimepiride on discharge  Follow up added in discharge tab with PCP  Ambulatory dysfunction  Assessment & Plan  Stable  PT/OT      Essential hypertension  Assessment & Plan  Stble  -continue home lisinopril 40 mg daily  -continue diltiazem 180 mg b i d  Atrial fibrillation with RVR ([facility] )  Assessment & Plan  Echo 2018 - EF 50%, no regional wall abnormalities, mildly dilated LV, RA  Prior EKGs reviewed - chronic afib  Plan  -cardiology is following  -Pt on diltiazem 180 mg b i d   -metoprolol 75 mg q12  -home Eliquis 5 mg b i d    -continue telemetry monitoring    Hyperlipidemia  Assessment & Plan  Plan   Stable  -continue home Lipitor 40 mg daily    * S/P transmetatarsal amputation of foot, right (HCC)  Assessment & Plan  Stable  S/p revision with plastic surgery  Antibiotic management per ID  Plastic surgery primary       Will sign off  Please tiget text if any questions  Plan communicated with primary team     SUBJECTIVE     Patient seen and examined  No acute events overnight  Denies any chest pain or shortness of breath  OBJECTIVE     Vitals:    21 0300 21 0523 21 0658 21 0800   BP: 114/56  133/73 119/68   BP Location: Right arm      Pulse: 84  84 (!) 110   Resp:   16    Temp: 98 3 °F (36 8 °C)  98 2 °F (36 8 °C)    TempSrc: Oral  Oral    SpO2: 100%  98%    Weight:  98 5 kg (217 lb 2 5 oz)     Height:          Temperature:   Temp (24hrs), Av 3 °F (36 8 °C), Min:97 7 °F (36 5 °C), Max:99 1 °F (37 3 °C)    Temperature: 98 2 °F (36 8 °C)  Intake & Output:  I/O        07 -  0700  07 -  0700  07 -  0700    P  O  880 936 118    Total Intake(mL/kg) 880 (9) 936 (9 5) 118 (1 2)    Urine (mL/kg/hr) 1700 (0 7) 1450 (0 6)     Drains 0 0     Total Output 1700 1450     Net -820 -514 +118               Weights:   IBW (Ideal Body Weight): 89 1 kg    Body mass index is 25 75 kg/m²  Weight (last 2 days)     Date/Time   Weight    21 0523   98 5 (217 15)    21 0529   97 9 (215 83)    21 0528   98 7 (217 59)    Weight: different bed at 21 0528            Physical Exam  Constitutional:       General: He is not in acute distress  Appearance: He is well-developed  He is not diaphoretic  HENT:      Head: Normocephalic and atraumatic  Nose: Nose normal       Mouth/Throat:      Pharynx: No oropharyngeal exudate     Eyes:      General: No scleral icterus  Right eye: No discharge  Left eye: No discharge  Neck:      Musculoskeletal: Normal range of motion and neck supple  Cardiovascular:      Rate and Rhythm: Tachycardia present  Rhythm irregular  Heart sounds: Normal heart sounds  No murmur  No friction rub  No gallop  Pulmonary:      Effort: Pulmonary effort is normal  No respiratory distress  Breath sounds: No stridor  No wheezing or rales  Abdominal:      General: Bowel sounds are normal  There is no distension  Palpations: Abdomen is soft  Tenderness: There is no abdominal tenderness  There is no guarding  Musculoskeletal: Normal range of motion  Skin:     General: Skin is warm  Findings: Lesion present  No erythema  Comments: Status post right leg wound revision- no signs of bleeding or infection  Neurological:      Mental Status: He is alert and oriented to person, place, and time  LABORATORY DATA     Labs: I have personally reviewed pertinent reports  Results from last 7 days   Lab Units 04/15/21  0448 04/14/21  0630 04/13/21  1412   WBC Thousand/uL 12 54* 12 84* 14 27*   HEMOGLOBIN g/dL 10 3* 9 6* 9 2*   HEMATOCRIT % 32 6* 30 4* 29 6*   PLATELETS Thousands/uL 227 210 218      Results from last 7 days   Lab Units 04/18/21  0442 04/16/21  0552 04/15/21  0809  04/13/21  0033 04/12/21  2324  04/12/21  1449   POTASSIUM mmol/L 4 6 4 3 3 8   < >  --   --    < >  --    CHLORIDE mmol/L 113* 108 108   < >  --   --    < >  --    CO2 mmol/L 16* 26 24   < >  --   --    < >  --    CO2, I-STAT mmol/L  --   --   --   --  23 23  --  24   BUN mg/dL 13 14 12   < >  --   --    < >  --    CREATININE mg/dL 0 79 0 84 0 82   < >  --   --    < >  --    CALCIUM mg/dL 8 6 9 0 8 4   < >  --   --    < >  --    GLUCOSE, ISTAT mg/dl  --   --   --   --  282* 317*  --  184*    < > = values in this interval not displayed       Results from last 7 days   Lab Units 04/13/21  1412 04/13/21  0555 04/12/21  1947   MAGNESIUM mg/dL 2 2 1 9 2 0     Results from last 7 days   Lab Units 04/13/21  1412 04/13/21  0555 04/12/21 1947   PHOSPHORUS mg/dL 2 7 1 9* 2 5      Results from last 7 days   Lab Units 04/15/21  0448 04/14/21  2329 04/14/21  1717  04/13/21  1412 04/12/21 1947   INR   --   --   --   --  1 32* 1 25*   PTT seconds 64* 75* 76*   < > 30 34    < > = values in this interval not displayed  IMAGING & DIAGNOSTIC TESTING     Radiology Results: I have personally reviewed pertinent reports  No results found  Other Diagnostic Testing: I have personally reviewed pertinent reports      ACTIVE MEDICATIONS     Current Facility-Administered Medications   Medication Dose Route Frequency    acetaminophen (TYLENOL) tablet 650 mg  650 mg Oral Q4H PRN    apixaban (ELIQUIS) tablet 5 mg  5 mg Oral BID    aspirin tablet 325 mg  325 mg Oral Daily    atorvastatin (LIPITOR) tablet 40 mg  40 mg Oral Daily With Dinner    bacitracin topical ointment 1 large application  1 large application Topical BID    diazepam (VALIUM) tablet 5 mg  5 mg Oral Q6H PRN    diltiazem (CARDIZEM) tablet 180 mg  180 mg Oral BID    docusate sodium (COLACE) capsule 100 mg  100 mg Oral BID    doxycycline hyclate (VIBRAMYCIN) capsule 100 mg  100 mg Oral Q12H [facility]    gabapentin (NEURONTIN) capsule 100 mg  100 mg Oral Daily    HYDROmorphone (DILAUDID) injection 0 5 mg  0 5 mg Intravenous Q2H PRN    insulin glargine (LANTUS) subcutaneous injection 15 Units 0 15 mL  15 Units Subcutaneous QAM    insulin lispro (HumaLOG) 100 units/mL subcutaneous injection 1-6 Units  1-6 Units Subcutaneous 4x Daily (AC & HS)    insulin lispro (HumaLOG) 100 units/mL subcutaneous injection 7 Units  7 Units Subcutaneous TID With Meals    lisinopril (ZESTRIL) tablet 40 mg  40 mg Oral Daily    melatonin tablet 3 mg  3 mg Oral HS    metoprolol succinate (TOPROL-XL) 24 hr tablet 75 mg  75 mg Oral BID    ondansetron (ZOFRAN) injection 4 mg  4 mg Intravenous Q6H PRN    oxyCODONE (ROXICODONE) IR tablet 10 mg  10 mg Oral Q4H PRN    oxyCODONE (ROXICODONE) IR tablet 5 mg  5 mg Oral Q4H PRN    QUEtiapine (SEROquel) tablet 12 5 mg  12 5 mg Oral QAM    And    QUEtiapine (SEROquel) tablet 25 mg  25 mg Oral HS    senna-docusate sodium (SENOKOT S) 8 6-50 mg per tablet 1 tablet  1 tablet Oral HS       VTE Pharmacologic Prophylaxis: Reason for no pharmacologic prophylaxis On Eliquis  VTE Mechanical Prophylaxis: sequential compression device    Portions of the record may have been created with voice recognition software  Occasional wrong word or "sound a like" substitutions may have occurred due to the inherent limitations of voice recognition software    Read the chart carefully and recognize, using context, where substitutions have occurred   ==  Silverio Clarke, [facility]  Internal Medicine Residency PGY-2

## 2021-04-19 NOTE — UTILIZATION REVIEW
Continued Stay Review    Date: 4/18/2021                       Current Patient Class: IP  Current Level of Care: Med/Surg  HPI:71 y o  male initially admitted on 4/7 diabetic ulcer of right midfoot   Assessment/Plan: POD #6 s/p debridement with flap closure of R foot with possible residual osteomyelitis  Vital Signs: pt with confusion, slowly improving  No c/o pain in R foot  Continue po doxy with tx for 6 wks post op (thru 5/24) per ID  Per plastic surgery - continue [facility] for now[facility]  Flap checks q4h, daily aspirin  Daily graft care  Replace Mepilex to LLE incision to leg every other day  Replace all Xeroform to R foot daily  Monitor MAGDALENE drain q4h  Fingerstick glucose checks w/ ssi  SCD's   Reg diet    Pertinent Labs/Diagnostic Results:   Date/Time  Temp  Pulse  Resp  BP  MAP (mmHg)  SpO2  O2 Device   04/18/21 2321  98 1 °F (36 7 °C)  71  17  109/66  82  100 %  None (Room air)   04/18/21 2133  --  101  --  124/81  --  --  --   04/18/21 19:37:24  99 1 °F (37 3 °C)  95  16  118/66  85  99 %  None (Room air)   04/18/21 1925  --  --  --  --  --  --  None (Room air)   04/18/21 15:22:03  98 1 °F (36 7 °C)  73  16  128/61  85  97 %  --   04/18/21 11:24:47  97 7 °F (36 5 °C)  93  16  111/68  --  99 %  None (Room air)   04/18/21 0700  97 8 °F (36 6 °C)  114Abnormal   19  140/75  --  99 %  None (Room air)   04/18/21 0300  --  81  18  109/74  --  98 %  None (Room air)   04/18/21 0250  --  --  --  --  --  --  None (Room air)   04/17/21 23:52:41  97 9 °F (36 6 °C)  77  18  114/67  --  99 %  None (Room air)   04/17/21 2215  --  --  --  --  --  --  None (Room air)   04/17/21 2140  --  68  --  118/68  --  --  --   04/17/21 1920  --  --  --  --  --  --  None (Room air)   04/17/21 1900  97 7 °F (36 5 °C)  93  18  148/81  --  100 %  --   04/17/21 1712  --  87  --  146/93  --  100 %  None (Room air)   04/17/21 1058  --  88  18  130/76  --  100 %  None (Room air)   04/17/21 0939  -- 122Abnormal   --  184/91Abnormal   --  --  --   04/17/21 07:21:36  98 2 °F (36 8 °C)  113Abnormal   18  159/72  --  100 %  None (Room air)   04/17/21 0310  --  --  --  --  --  --  None (Room air)   04/17/21 0307  97 7 °F (36 5 °C)  85  18  119/73  91  99 %  --       Results from last 7 days   Lab Units 04/15/21  0448 04/14/21  0630 04/13/21  1412 04/13/21  0555 04/13/21  0033   WBC Thousand/uL 12 54* 12 84* 14 27* 11 37*  --    HEMOGLOBIN g/dL 10 3* 9 6* 9 2* 9 7*  --    I STAT HEMOGLOBIN g/dl  --   --   --   --  10 2*   HEMATOCRIT % 32 6* 30 4* 29 6* 30 5*  --    HEMATOCRIT, ISTAT %  --   --   --   --  30*   PLATELETS Thousands/uL 227 210 218 221  --      Results from last 7 days   Lab Units 04/18/21  0442 04/16/21  0552 04/15/21  0809 04/14/21  0449 04/13/21  1412 04/13/21  0555 04/13/21  0033 04/12/21  2324 04/12/21  1947 04/12/21  1449 04/12/21  1324   SODIUM mmol/L 135* 140 139 142 145 142  --   --  140  --   --    POTASSIUM mmol/L 4 6 4 3 3 8 4 1 4 2 3 9  --   --  4 5  --   --    CHLORIDE mmol/L 113* 108 108 115* 116* 111*  --   --  113*  --   --    CO2 mmol/L 16* 26 24 23 23 24  --   --  22  --   --    CO2, I-STAT mmol/L  --   --   --   --   --   --  23 23  --  24 25   ANION GAP mmol/L 6 6 7 4 6 7  --   --  5  --   --    BUN mg/dL 13 14 12 9 11 11  --   --  11  --   --    CREATININE mg/dL 0 79 0 84 0 82 0 81 0 94 0 84  --   --  1 04  --   --    EGFR ml/min/1 73sq m 92 89 90 91 82 89  --   --  73  --   --    CALCIUM mg/dL 8 6 9 0 8 4 8 2* 8 1* 8 4  --   --  8 3  --   --    CALCIUM, IONIZED, ISTAT mmol/L  --   --   --   --   --   --  1 18 1 14  --  1 17 1 19   MAGNESIUM mg/dL  --   --   --   --  2 2 1 9  --   --  2 0  --   --    PHOSPHORUS mg/dL  --   --   --   --  2 7 1 9*  --   --  2 5  --   --      Results from last 7 days   Lab Units 04/18/21  2127 04/18/21  1624 04/18/21  1128 04/18/21  0624 04/17/21  2057 04/17/21  1616 04/17/21  1046 04/17/21  0755 04/16/21  2109 04/16/21  1632   POC GLUCOSE mg/dl 164* 171* 168* 149* 128 179* 194* 190* 194* 178*     Results from last 7 days   Lab Units 04/18/21  0442 04/16/21  0552 04/15/21  0809 04/14/21 0449 04/13/21  1412 04/13/21  0555 04/12/21  1947   GLUCOSE RANDOM mg/dL 129 141* 186* 152* 146* 137 310*     Results from last 7 days   Lab Units 04/13/21  0033 04/12/21  2324 04/12/21  1449 04/12/21  1324   PH, MAGALI I-STAT  7 377 7 418*  --   --    PCO2, MAGALI ISTAT mm HG 37 0* 33 9*  --   --    PO2, MAGALI ISTAT mm  0* 304 0*  --   --    HCO3, MAGALI ISTAT mmol/L 21 7* 21 9*  --   --    I STAT BASE EXC mmol/L -3* -2 -3* -2   I STAT O2 SAT % 100* 100* 99* 99*   ISTAT PH ART   --   --  7 333* 7 361   I STAT ART PCO2 mm HG  --   --  42 0 41 4   I STAT ART PO2 mm HG  --   --  151 0* 153 0*   I STAT ART HCO3 mmol/L  --   --  22 3 23 4     Results from last 7 days   Lab Units 04/15/21  0448 04/14/21  2329 04/14/21  1717  04/13/21  1412 04/12/21  1947   PROTIME seconds  --   --   --   --  16 4* 15 7*   INR   --   --   --   --  1 32* 1 25*   PTT seconds 64* 75* 76*   < > 30 34    < > = values in this interval not displayed         Medications:   Scheduled Medications:  apixaban, 5 mg, Oral, BID  aspirin, 325 mg, Oral, Daily  atorvastatin, 40 mg, Oral, Daily With Dinner  bacitracin, 1 large application, Topical, BID  diltiazem, 180 mg, Oral, BID  docusate sodium, 100 mg, Oral, BID  doxycycline hyclate, 100 mg, Oral, Q12H SUNG  gabapentin, 100 mg, Oral, Daily  insulin glargine, 15 Units, Subcutaneous, QAM  insulin lispro, 1-6 Units, Subcutaneous, 4x Daily (AC & HS)  insulin lispro, 7 Units, Subcutaneous, TID With Meals  lisinopril, 40 mg, Oral, Daily  melatonin, 3 mg, Oral, HS  metoprolol succinate, 75 mg, Oral, BID  QUEtiapine, 12 5 mg, Oral, QAM    And  QUEtiapine, 25 mg, Oral, HS  senna-docusate sodium, 1 tablet, Oral, HS       PRN Meds:  acetaminophen, 650 mg, Oral, Q4H PRN  diazepam, 5 mg, Oral, Q6H PRN  HYDROmorphone, 0 5 mg, Intravenous, Q2H PRN  ondansetron, 4 mg, Intravenous, Q6H PRN  oxyCODONE, 10 mg, Oral, Q4H PRN  oxyCODONE, 5 mg, Oral, Q4H PRN        Discharge Plan: TBD    Network Utilization Review Department  ATTENTION: Please call with any questions or concerns to [phone] and carefully listen to the prompts so that you are directed to the right person  All voicemails are confidential   Jeferson Mendoza all requests for admission clinical reviews, approved or denied determinations and any other requests to dedicated fax number below belonging to the campus where the patient is receiving treatment   List of dedicated fax numbers for the Facilities:  [address] DENIALS (Administrative/Medical Necessity) [phone]   [address] (Maternity/NICU/Pediatrics) [phone] [address] [place] [address] [place] [id number] [place] [address] [address] [facility] [address] [facility] [phone]

## 2021-04-19 NOTE — PROGRESS NOTES
Progress Note - Plastic Surgery   Brianda Hinkle  73 y o  male MRN: [id number]  Unit/Bed#: [facility] 525-01 Encounter: [id number]    Assessment:  Principal Problem:    S/P transmetatarsal amputation of foot, right (HCC)  Active Problems:    Atrial fibrillation with RVR ([facility] )    Hyperlipidemia    Type 2 diabetes mellitus with diabetic neuropathy ([facility] )    Essential hypertension    Acquired absence of other right toe(s) ([facility] )    Deep disruption or dehiscence of operation wound    Ambulatory dysfunction    Diabetic ulcer of right 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, with fat layer exposed ([place] [address] )    Sleep disturbance    73 y o  male POD 7 from ALT reconstruction of R foot defect, subsequent takebacks on POD 0 and 1 for venous congestion  Overall doing well  Delirium appears mostly resolved  Clinically stable   VSS, A fib rate controlled   R foot free flap doing well     Plan:  - Continue Q4 hr flap, hamilton hugger  Ok to move to commode with leg elevated at all times  - Cont Eliquis, daily aspirin  - Cont regular diet, off IVF  - Appreciate assistance of Gerontology, Cards, ID and IM  - Doxy BID through 5/24  - Daily xeroform replacement to foot  - Discussed with Dr Emily Lopez  - PT and OT: pt may transfer to recliner and commode with foot elevated  Can work upper extermity strength  Will likely need a trapeze on bed, etc   - Dispo: will discuss with CM, will need facility for discharge at the end of the week    Jabari Amin MD  Resident, Plastic & Reconstructive Surgery  [phone] (c)     Subjective/Objective   Chief Complaint: R foot wound    Subjective: resting in bed  Pain controlled  Objective:     Blood pressure 99/50, pulse 56, temperature 99 °F (37 2 °C), temperature source Oral, resp  rate 18, height 6' 5" (1 956 m), weight 98 5 kg (217 lb 2 5 oz), SpO2 95 %  ,Body mass index is 25 75 kg/m²        Intake/Output Summary (Last 24 hours) at 4/19/2021 1246  Last data filed at 4/19/2021 0900  Gross per 24 hour   Intake 1054 ml   Output 1050 ml   Net 4 ml       Invasive Devices     Peripheral Intravenous Line            Peripheral IV 04/16/21 Left Antecubital 3 days          Drain            Closed/Suction Drain Right Thigh Bulb 15 Fr  6 days    External Urinary Catheter Small 3 days                Physical Exam:   Gen: NAD  Neuro: Alert, oriented  CV: regular rate  Pulm: no respiratory distress  Abd: nondistended  GI: Astorga catheter  Ext: RLE free flap is pink, soft, warm  Cap refill is about 2 seconds  Micro flow  with strong venous doppler signal via A, no signal via B  Vioptix replaced, perfusion in the 30% range, signal quality > 90%  Arterial and venous signals at both marking sutures on the flap and elsewhere  Minimal serosang ooze from flap edges     Lab, Imaging and other studies:I have personally reviewed pertinent lab results      VTE Pharmacologic Prophylaxis: Sequential compression device (Venodyne), heparin drip, switching to Eliquis today  VTE Mechanical Prophylaxis: sequential compression device

## 2021-04-19 NOTE — CASE MANAGEMENT
A post acute care recommendation was made by your care team for STR  Discussed Freedom of Choice with patient  List of facilities given to patient via in person  patient aware the list is custom filtered for them by zip code location and that [facility] post acute providers are designated  Pt preference [employer]  Referral placed in [place]  Pt to look over list and let CM know other choices    CM obtained additional STR choices and referrals placed

## 2021-04-19 NOTE — OCCUPATIONAL THERAPY NOTE
Occupational Therapy Evaluation      Shara Fail     4/19/2021    Principal Problem:    S/P transmetatarsal amputation of foot, right ([address] )  Active Problems:    Hyperlipidemia    Type 2 diabetes mellitus with diabetic neuropathy (HCC)    Atrial fibrillation with RVR ([address] )    Essential hypertension    Acquired absence of other right toe(s) ([address] )    Deep disruption or dehiscence of operation wound    Ambulatory dysfunction    Diabetic ulcer of right 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, with fat layer exposed ([address] )    Sleep disturbance      Past Medical History:   Diagnosis Date    Arthritis     Atrial fibrillation ([address] )     Diagnosed 11/2018    Benign prostate hyperplasia 04/2002    Cellulitis     right lower leg     Colon polyp 2006    Diabetes mellitus ([address] )     Hearing aid worn     bilat    Hearing loss     90% loss left ear and 40% right ear    History of clubfoot     "since birth"    History of pneumonia     History of TIA (transient ischemic attack) 04/25/2017 11/30/18 pt denies    Hyperlipidemia 5/15/2014    Hypertension     Infectious viral hepatitis     "cant remember type"    Irregular heart beat     Neuropathy     both feet    Osteomyel[facility]     right great toe    Right middle lobe pulmonary nodule 11/6/2018    Seasickness     Teeth missing     Type 2 diabetes mellitus with diabetic neuropathy ([facility] ) 11/6/2018    Wears glasses     reading    Wound, open     bottom of right foot       Past Surgical History:   Procedure Laterality Date    BUNIONECTOMY Right 12/4/2018    Procedure: 5TH METATARSAL BONE PARTIAL RESECTION, FULL THICKNESS DEBRIDEMENT OF DIABETIC ULCER;  Surgeon: Maxim Ramirez DPM;  [facility];  Service: Podiatry    CLUB FOOT RELEASE Bilateral     COLONOSCOPY      EXTERNAL FIXATOR APPLICATION Right 3/29/7043    Procedure: Application multiplane external fixation;  Surgeon: Chad Beaulieu DPM;  [facility];  Service: Podiatry    FOOT HARDWARE REMOVAL Right 2/17/2021    Procedure: REMOVAL EXTERNAL FIXATOR;  Surgeon: Zenaida Edge DPM;  [facility];  Service: Podiatry    INCISION AND DRAINAGE OF WOUND Right 2/17/2021    Procedure: INCISION AND DRAINAGE (I&D) EXTREMITY;  Surgeon: Zenaida Edge DPM;  [facility];  Service: Podiatry    ORIF FOOT FRACTURE Right 3/4/2021    Procedure: VAC PLACEMENT; SCREW FIXATION RIGHT FOOT FUSION;  Surgeon: Zenaida Edge DPM;  [facility];  Service: Podiatry    [address] [employer] Right 2/22/2021    Procedure: AMPUTATION TRANSMETATARSAL (TMA), REMOVAL NAIL IM T2 ICF HARDWARE;  Surgeon: Zenaida Edge DPM;  [facility];  Service: Podiatry    SD FUSION FOOT BONES,MIDTARSAL,OSTEOTMY Right 2/12/2021    Procedure: foot ARTHRODESIS/FUSION;  Surgeon: Zenaida Edge DPM;  [facility];  Service: Podiatry    SD LENGTH/SHORT LEG/ANKL TENDON,SINGLE Right 2/12/2021    Procedure: LENGTHEN TIBIAL TENDON, TRANS HEEL CORD;  Surgeon: Zenaida Edge DPM;  [facility];  Service: Podiatry    SD MUSCLE-SKIN FLAP,TRUNK Right 4/12/2021    Procedure: RECONSTRUCTION MICROSURGICAL W/ FREE FLAP;  Surgeon: Laquita Carr MD;  [facility];  Service: Plastics    SD MUSCLE-SKIN FLAP,TRUNK Right 4/12/2021    Procedure: TAKEBACK RECONSTRUCTION MICROSURGICAL W/ FREE FLAP;  Surgeon: Laquita Carr MD;  [facility];  Service: Plastics    SD MUSCLE-SKIN FLAP,TRUNK Right 4/13/2021    Procedure: RECONSTRUCTION MICROSURGICAL W/ FREE FLAP, RE EXPLORATION, MICRO VASCULAR REVISION;  Surgeon: Laquita Carr MD;  [facility];  Service: Plastics    SD PART [address] BONE METATARSAL HEAD,EA Right 12/29/2020    Procedure: EXCISION EXOSTOSIS;  Surgeon: Zenaida Edge DPM;  [facility];  Service: Waspikel Gerald SURG WND EXTEN/COMPLIC Right 20/29/9989    Procedure: PRIMARY DELAYED CLOSURE;  Surgeon: Guero Golden DPM;  [facility];  Service: Podiatry    TOE AMPUTATION Right     partial great toe    TONSILLECTOMY      VAC DRESSING APPLICATION Right 7/4/7496    Procedure: APPLICATION VAC DRESSING EXTREMITY;  Surgeon: Lili Madrid MD;  [facility];  Service: Plastics    [address] Right 3/1/2021    Procedure: DEBRIDEMENT LOWER EXTREMITY [facility] OUT); Surgeon: Lili Madrid MD;  [facility];  Service: Plastics    WOUND DEBRIDEMENT Right 4/7/2021    Procedure: DEBRIDEMENT RIGHT FOOT;  Surgeon: Lili Madrid MD;  [facility];  Service: Plastics    WOUND DEBRIDEMENT Right 4/12/2021    Procedure: DEBRIDEMENT LOWER EXTREMITY [facility] OUT); Surgeon: Lili Madrid MD;  [facility];  Service: Plastics        04/19/21 1040   OT Last Visit   OT Visit Date 04/19/21   Note Type   Note type Evaluation   Restrictions/Precautions   Weight Bearing Precautions Per Order Yes   RLE Weight Bearing Per Order NWB   LLE Weight Bearing Per Order WBAT   Other Precautions Multiple lines;Pain;Limb alert;Contact/isolation  (4/19 allowed OOB to recliner/BSC w RLE elevated/NWB)   Pain Assessment   Pain Assessment Tool 0-10   Pain Score No Pain   Home Living   Type of Home House  (modular home)   Home Layout One level;Ramped entrance   [address]   [place]; Wheelchair-manual   Prior Function   Level of [place] Needs assistance with IADLs   Lives With Spouse   Receives Help From Family;Home health   ADL Assistance Needs assistance   IADLs Needs assistance   Falls in the last 6 months 1 to 4   Vocational Unemployed   Lifestyle   Autonomy pt reports being independent w basic self care, wife assist as needed   also has home care   Reciprocal Relationships supportive wife   Psychosocial   Psychosocial (WDL) WDL   ADL   Eating Assistance 7  Independent   Eating Deficit Setup   Grooming Assistance 5  Supervision/Setup   Grooming Deficit Setup; Increased time to complete   UB Bathing Assistance 4  Minimal Assistance   UB Bathing Deficit Setup; Increased time to complete   LB Bathing Assistance 2  Maximal Assistance   LB Bathing Deficit Increased time to complete   UB Dressing Assistance 4  Minimal Assistance   LB Dressing Assistance 2  Maximal Assistance   Toileting Assistance  2  Maximal Assistance   Bed Mobility   Rolling R 4  Minimal assistance   Additional items Bedrails   Rolling L 4  Minimal assistance   Additional items Bedrails   Supine to Sit 4  Minimal assistance   Additional items Increased time required   Sit to Supine 4  Minimal assistance   Additional items LE management   Transfers   Sit to Stand 4  Minimal assistance   Additional items Assist x 1; Increased time required   Stand to Sit 4  Minimal assistance   Additional items Assist x 1; Increased time required   Additional Comments pt able to stand w min assist + assist to keep RLE elevated   Functional Mobility   Additional Comments only able to stand at bedside x 15 seconds w assist of 1-2 people  assisted back to bed due to bleeding from RLE flap site  RN aware and in room to assess   Balance   Static Sitting Fair +   Dynamic Sitting Fair -   Static Standing Poor   Activity Tolerance   Activity Tolerance Treatment limited secondary to medical complications (Comment)   Medical Staff Made Aware RAVEN Escamilla   Nurse Made Aware RAVEN BHAGAT Assessment   RUE Assessment WFL   LUE Assessment   LUE Assessment WFL   Hand Function   Gross Motor Coordination Functional   Fine Motor Coordination Functional   Cognition   Overall Cognitive Status Impaired   Arousal/Participation Alert; Cooperative   Attention Attends with cues to redirect   Orientation Level Oriented X4   Memory Decreased recall of recent events   Following Commands Follows one step commands without difficulty   Assessment   Limitation Decreased ADL status; Decreased UE strength;Decreased endurance;Decreased Safe judgement during ADL;Decreased self-care trans;Decreased high-level ADLs   Assessment Pt is a 73 y o  male seen for OT evaluation s/p admit [facility]2021 w/ S/P transmetatarsal amputation of foot, right ([address] )  Pt s/p takeback reconstruction microsurgical w/fee flap R foot, returned to OR 4/13 for exploration  Pt currently allowed (as of today) to transfer to bs or recliner chair w R foot elevated, NWB  Pt has a bear hugger over LE's, [id number] and multiple lines  Bloody discharge noted  RN cleared for OT evaluation  Comorbidities affecting pt's functional performance at time of assessment include: DM, HTN and s/p TMA R foot, sleep disturbance, R middle lobe pulm nodule, multiple surgeries,   Personal factors affecting pt at time of IE include:limited home support, difficulty performing ADLS, difficulty performing IADLS , limited insight into deficits, compliance and decreased initiation and engagement   Prior to admission, pt was living at home w his wife  Pt reports that he normally is fully independent w self care and mobility  Wife able to assist as needed  Upon evaluation: Pt requires total assist LB self care, min assist UB self care, min assist bedmobiltiy and sit to stand  Increased bleeding noted with standing  RN aware and contacted plastic surgery  Pt with  the following deficits impacting occupational performance: decreased strength, decreased balance, decreased tolerance and decreased safety awareness  Pt to benefit from continued skilled OT tx while in the hospital to address deficits as defined above and maximize level of functional independence w ADL's and functional mobility  Occupational Performance areas to address include: grooming, bathing/shower, toilet hygiene, dressing, functional mobility and clothing management  Pt scored 30 /100 on the barthel index   The patient's raw score on the AM-PAC Daily Activity inpatient short form is 14, standardized score is 33 39, less than 39 4  Patients at this level are likely to benefit from discharge to post-acute rehabilitation services  Please refer to the recommendation of the Occupational Therapist for safe discharge planning  Based on findings from OT evaluation and functional performance deficits, pt has been identified as a High complexity evaluation  From OT standpoint, recommendation at time of d/c would be STR as patient is currently functioning below baseline  Goals   Patient Goals to be able to get OOB   STG Time Frame   (7 days)   Short Term Goal #1 pt will be able to complete bedmobility mod I w good safety   Short Term Goal #2 be able to get to bsc w min assist of 2 for SPT, maintaining precuations as set by vascular surgery    Short Term Goal  demonstrate good ECT/self pacing skills w all self care   LTG Time Frame 10-14   Long Term Goal #1 Increase UB strenght and acitivy tolerance through UE strenghtening exercises for increased ability to particiapte in transfers etc   Long Term Goal #2 complete UB bathing and dressing mod I at bedlevel   Plan   Treatment Interventions ADL retraining;Functional transfer training;UE strengthening/ROM; Endurance training;Cognitive reorientation;Patient/family training;Equipment evaluation/education; Compensatory technique education; Energy conservation; Activityengagement   Goal Expiration Date 05/03/21   OT Frequency 2-3x/wk   Recommendation   OT Discharge Recommendation Post acute rehabilitation services   AM-PAC Daily Activity Inpatient   Lower Body Dressing 1   Bathing 1   Toileting 2   Upper Body Dressing 3   Grooming 3   Eating 4   Daily Activity Raw Score 14   Daily Activity Standardized Score (Calc for Raw Score >=11) 33 39   Barthel Index   Feeding 10   Bathing 0   Grooming Score 0   Dressing Score 5   Bladder Score 0   Bowels Score 10   Toilet Use Score 5   Transfers (Bed/Chair) Score 0   Mobility (Level Surface) Score 0 Stairs Score 0   Barthel Index Score 30

## 2021-04-19 NOTE — PROGRESS NOTES
Progress Note - Infectious Disease   Bean Sandoval  73 y o  male MRN: [id number]  Unit/Bed#: [facility] 525-01 Encounter: [id number]      Impression/Recommendations:  1  Possible residual osteomyelitis of right foot   Patient is status post screw fixation right foot on , with operative bone scraping culture growing Enterobacter   Patient has been on p o  Doxycycline since and is tolerating it well  Lizette Ast, he had a persistent open foot wound   Patient is now status post flap closure of right foot   With successful flap closure, there is no further bone exposed   The best course of action at this point is to have patient continue antibiotic for 6 weeks after flap closure  Continue p o  Doxycycline  Treat x6 weeks after flap closure, through       2  Open right foot wound   Patient is status post flap closure, requiring return to OR twice for revision due to congestion   Wound is clean, without evidence of cellulitis  Wound care per Plastic surgery      3  Status post screw fixation right foot on       4  Status post failed right foot reconstruction, requiring TMA   All hardware prior to TMA were removed   However, as in above, patient is status post screw fixation of right foot after TMA      5  DM, somewhat poorly controlled, with elevated hemoglobin A1c  Management per Medicine Service      Discussed with patient in detail regarding the above plan        Antibiotics:  Doxycycline  POD # 7     Subjective:  Patient is awake and alert   Confusion slowly improving  No pain in right foot  Temperature stays down   No chills    He is tolerating antibiotic well   No nausea, vomiting or diarrhea      Objective:  Vitals:  Temp:  [98 1 °F (36 7 °C)-99 1 °F (37 3 °C)] 99 °F (37 2 °C)  HR:  [] 56  Resp:  [16-19] 18  BP: ()/(50-81) 99/50  SpO2:  [95 %-100 %] 95 %  Temp (24hrs), Av 5 °F (36 9 °C), Min:98 1 °F (36 7 °C), Max:99 1 °F (37 3 °C)  Current: Temperature: 99 °F (37 2 °C)    Physical Exam:     General: Awake, alert, cooperative, no distress  Mildly confused  Neck:  Supple  No mass  No lymphadenopathy  Lungs: Expansion symmetric, no rales, no wheezing, respirations unlabored  Heart:  Regular rate and rhythm, S1 and S2 normal, no murmur  Abdomen: Soft, nondistended, non-tender, bowel sounds active all four quadrants, no masses, no organomegaly  Extremities: Improving right foot edema  Incision healing well  No erythema/warmth  No drainage  Nontender to palpation  Skin:  No rash  Neuro: Moves all extremities  Invasive Devices     Peripheral Intravenous Line            Peripheral IV 04/16/21 Left Antecubital 3 days          Drain            Closed/Suction Drain Right Thigh Bulb 15 Fr  6 days    External Urinary Catheter Small 3 days                Labs studies:   I have personally reviewed pertinent labs  Results from last 7 days   Lab Units 04/18/21  0442 04/16/21  0552 04/15/21  0809  04/13/21  0033 04/12/21  2324  04/12/21  1449   POTASSIUM mmol/L 4 6 4 3 3 8   < >  --   --    < >  --    CHLORIDE mmol/L 113* 108 108   < >  --   --    < >  --    CO2 mmol/L 16* 26 24   < >  --   --    < >  --    CO2, I-STAT mmol/L  --   --   --   --  23 23  --  24   BUN mg/dL 13 14 12   < >  --   --    < >  --    CREATININE mg/dL 0 79 0 84 0 82   < >  --   --    < >  --    EGFR ml/min/1 73sq m 92 89 90   < >  --   --    < >  --    GLUCOSE, ISTAT mg/dl  --   --   --   --  282* 317*  --  184*   CALCIUM mg/dL 8 6 9 0 8 4   < >  --   --    < >  --     < > = values in this interval not displayed  Results from last 7 days   Lab Units 04/15/21  0448 04/14/21  0630 04/13/21  1412   WBC Thousand/uL 12 54* 12 84* 14 27*   HEMOGLOBIN g/dL 10 3* 9 6* 9 2*   PLATELETS Thousands/uL 227 210 218           Imaging Studies:   I have personally reviewed pertinent imaging study reports and images in PACS  EKG, Pathology, and Other Studies:   I have personally reviewed pertinent reports

## 2021-04-19 NOTE — PLAN OF CARE
Problem: OCCUPATIONAL THERAPY ADULT  Goal: Performs self-care activities at highest level of function for planned discharge setting  See evaluation for individualized goals  Note: Limitation: Decreased ADL status, Decreased UE strength, Decreased endurance, Decreased Safe judgement during ADL, Decreased self-care trans, Decreased high-level ADLs     Assessment: Pt is a 73 y o  male seen for OT evaluation s/p admit to [facility] on 4/7/2021 w/ S/P transmetatarsal amputation of foot, right ([address] )  Pt s/p takeback reconstruction microsurgical w/fee flap R foot, returned to OR 4/13 for exploration  Pt currently allowed (as of today) to transfer to bs or recliner chair w R foot elevated, NWB  Pt has a bear hugger over LE's, [id number] and multiple lines  Bloody discharge noted  RN cleared for OT evaluation  Comorbidities affecting pt's functional performance at time of assessment include: DM, HTN and s/p TMA R foot, sleep disturbance, R middle lobe pulm nodule, multiple surgeries,   Personal factors affecting pt at time of IE include:limited home support, difficulty performing ADLS, difficulty performing IADLS , limited insight into deficits, compliance and decreased initiation and engagement   Prior to admission, pt was living at home w his wife  Pt reports that he normally is fully independent w self care and mobility  Wife able to assist as needed  Upon evaluation: Pt requires total assist LB self care, min assist UB self care, min assist bedmobiltiy and sit to stand  Increased bleeding noted with standing  RN aware and contacted plastic surgery  Pt with  the following deficits impacting occupational performance: decreased strength, decreased balance, decreased tolerance and decreased safety awareness  Pt to benefit from continued skilled OT tx while in the hospital to address deficits as defined above and maximize level of functional independence w ADL's and functional mobility   Occupational Performance areas to address include: grooming, bathing/shower, toilet hygiene, dressing, functional mobility and clothing management  Pt scored 30 /100 on the barthel index  The patient's raw score on the AM-PAC Daily Activity inpatient short form is 14, standardized score is 33 39, less than 39 4  Patients at this level are likely to benefit from discharge to post-acute rehabilitation services  Please refer to the recommendation of the Occupational Therapist for safe discharge planning  Based on findings from OT evaluation and functional performance deficits, pt has been identified as a High complexity evaluation  From OT standpoint, recommendation at time of d/c would be STR as patient is currently functioning below baseline        OT Discharge Recommendation: Post acute rehabilitation services

## 2021-04-20 LAB
GLUCOSE SERPL-MCNC: 128 MG/DL (ref 65–140)
GLUCOSE SERPL-MCNC: 149 MG/DL (ref 65–140)
GLUCOSE SERPL-MCNC: 195 MG/DL (ref 65–140)
GLUCOSE SERPL-MCNC: 248 MG/DL (ref 65–140)

## 2021-04-20 PROCEDURE — 99232 SBSQ HOSP IP/OBS MODERATE 35: CPT | Performed by: INTERNAL MEDICINE

## 2021-04-20 PROCEDURE — 82948 REAGENT STRIP/BLOOD GLUCOSE: CPT

## 2021-04-20 PROCEDURE — 99024 POSTOP FOLLOW-UP VISIT: CPT | Performed by: PLASTIC SURGERY

## 2021-04-20 RX ADMIN — DOCUSATE SODIUM 100 MG: 100 CAPSULE, LIQUID FILLED ORAL at 17:12

## 2021-04-20 RX ADMIN — APIXABAN 5 MG: 5 TABLET, FILM COATED ORAL at 08:24

## 2021-04-20 RX ADMIN — BACITRACIN 1 LARGE APPLICATION: 500 OINTMENT TOPICAL at 08:29

## 2021-04-20 RX ADMIN — ATORVASTATIN CALCIUM 40 MG: 40 TABLET, FILM COATED ORAL at 17:12

## 2021-04-20 RX ADMIN — MELATONIN TAB 3 MG 3 MG: 3 TAB at 21:03

## 2021-04-20 RX ADMIN — LISINOPRIL 40 MG: 20 TABLET ORAL at 08:25

## 2021-04-20 RX ADMIN — BACITRACIN 1 LARGE APPLICATION: 500 OINTMENT TOPICAL at 17:14

## 2021-04-20 RX ADMIN — DOXYCYCLINE 100 MG: 100 CAPSULE ORAL at 21:03

## 2021-04-20 RX ADMIN — APIXABAN 5 MG: 5 TABLET, FILM COATED ORAL at 17:12

## 2021-04-20 RX ADMIN — INSULIN GLARGINE 15 UNITS: 100 INJECTION, SOLUTION SUBCUTANEOUS at 08:30

## 2021-04-20 RX ADMIN — METOPROLOL SUCCINATE 75 MG: 50 TABLET, EXTENDED RELEASE ORAL at 08:24

## 2021-04-20 RX ADMIN — INSULIN LISPRO 2 UNITS: 100 INJECTION, SOLUTION INTRAVENOUS; SUBCUTANEOUS at 11:49

## 2021-04-20 RX ADMIN — DILTIAZEM HYDROCHLORIDE 180 MG: 60 TABLET, FILM COATED ORAL at 08:23

## 2021-04-20 RX ADMIN — METOPROLOL SUCCINATE 75 MG: 50 TABLET, EXTENDED RELEASE ORAL at 21:12

## 2021-04-20 RX ADMIN — ASPIRIN 325 MG ORAL TABLET 325 MG: 325 PILL ORAL at 08:25

## 2021-04-20 RX ADMIN — INSULIN LISPRO 3 UNITS: 100 INJECTION, SOLUTION INTRAVENOUS; SUBCUTANEOUS at 21:10

## 2021-04-20 RX ADMIN — DILTIAZEM HYDROCHLORIDE 180 MG: 60 TABLET, FILM COATED ORAL at 17:10

## 2021-04-20 RX ADMIN — QUETIAPINE FUMARATE 25 MG: 25 TABLET ORAL at 21:04

## 2021-04-20 RX ADMIN — DOCUSATE SODIUM AND SENNOSIDES 1 TABLET: 8.6; 5 TABLET ORAL at 21:04

## 2021-04-20 RX ADMIN — DOXYCYCLINE 100 MG: 100 CAPSULE ORAL at 08:25

## 2021-04-20 RX ADMIN — DOCUSATE SODIUM 100 MG: 100 CAPSULE, LIQUID FILLED ORAL at 08:23

## 2021-04-20 RX ADMIN — QUETIAPINE FUMARATE 12.5 MG: 25 TABLET ORAL at 08:24

## 2021-04-20 RX ADMIN — GABAPENTIN 100 MG: 100 CAPSULE ORAL at 08:25

## 2021-04-20 NOTE — PROGRESS NOTES
Progress Note - Plastic Surgery   Genasys  73 y o  male MRN: [id number]  Unit/Bed#: [facility] 525-01 Encounter: [id number]    Assessment:  Principal Problem:    S/P transmetatarsal amputation of foot, right (HCC)  Active Problems:    Atrial fibrillation with RVR ([facility] )    Hyperlipidemia    Type 2 diabetes mellitus with diabetic neuropathy ([facility] )    Essential hypertension    Acquired absence of other right toe(s) ([facility] )    Deep disruption or dehiscence of operation wound    Ambulatory dysfunction    Diabetic ulcer of right 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, with fat layer exposed ([facility] )    Sleep disturbance    73 y o  male POD 7 from ALT reconstruction of R foot defect, subsequent takebacks on POD 0 and 1 for venous congestion  Overall doing well  Delirium appears mostly resolved  Clinically stable   VSS, A fib rate controlled   R foot free flap doing well     Plan:  - Continue Q4 hr flap, hamilton hugger  Ok to move to commode with leg elevated at all times  - Cont Eliquis, daily aspirin  - Cont regular diet, off IVF  - Appreciate assistance of Gerontology, Cards, ID and IM  - Doxy BID through 5/24  - Daily xeroform replacement to foot  - Discussed with Dr Tora Hodgkin  - PT and OT: pt may transfer to recliner and commode with foot elevated  Can work upper extermity strength  Will likely need a trapeze on bed, etc   - Dispo: will discuss with CM, will need facility for discharge at the end of the week    Gene Downing MD  Resident, Plastic & Reconstructive Surgery  [phone] (c)     Subjective/Objective   Chief Complaint: R foot wound    Subjective: resting in bed  Pain controlled  Objective:     Blood pressure 112/70, pulse 83, temperature 97 9 °F (36 6 °C), temperature source Oral, resp  rate 18, height 6' 5" (1 956 m), weight 99 1 kg (218 lb 7 6 oz), SpO2 98 %  ,Body mass index is 25 91 kg/m²        Intake/Output Summary (Last 24 hours) at 4/20/2021 1503  Last data filed at 4/20/2021 1300  Gross per 24 hour   Intake 660 ml   Output 1605 ml   Net -945 ml       Invasive Devices     Peripheral Intravenous Line            Peripheral IV 04/20/21 Left Forearm less than 1 day          Drain            Closed/Suction Drain Right Thigh Bulb 15 Fr  8 days    External Urinary Catheter Small 4 days                Physical Exam:   Gen: NAD  Neuro: Alert, oriented  CV: regular rate  Pulm: no respiratory distress  Abd: nondistended  GI: Astorga catheter  Ext: RLE free flap is pink, soft, warm  Cap refill is about 2 seconds  Micro flow  with strong venous doppler signal via A, no signal via B  Vioptix replaced, perfusion in the 40% range, signal quality > 90%  Arterial and venous signals at both marking sutures on the flap and elsewhere  Minimal serosang ooze from flap edges     Lab, Imaging and other studies:I have personally reviewed pertinent lab results      VTE Pharmacologic Prophylaxis: Sequential compression device (Venodyne), Eliquis   VTE Mechanical Prophylaxis: sequential compression device

## 2021-04-20 NOTE — PROGRESS NOTES
Patient accidentally disconnected Vioptix from flap site  Vioptix was attached and reinforced  StO2 reading above 35 %, and signal quality reading above 93  TTX sent to Juan Zurita (per PACs that is who is covering for plastics)  Awaiting response       0630-No new orders

## 2021-04-20 NOTE — PROGRESS NOTES
Progress Note - Infectious Disease   Percy Becerril  73 y o  male MRN: [id number]  Unit/Bed#: [facility] 525-01 Encounter: [id number]      Impression/Recommendations:  1  Possible residual osteomyelitis of right foot   Patient is status post screw fixation right foot on , with operative bone scraping culture growing Enterobacter   Patient has been on p o  Doxycycline since and is tolerating it well  Girish Tai, he had a persistent open foot wound   Patient is now status post flap closure of right foot   With successful flap closure, there is no further bone exposed   The best course of action at this point is to have patient continue antibiotic for 6 weeks after flap closure  Continue p o  Doxycycline  Treat x6 weeks after flap closure, through       2  Open right foot wound   Patient is status post flap closure, requiring return to OR twice for revision due to congestion   Wound is clean and healing well, without evidence of cellulitis  Wound care per Plastic surgery      3  Status post screw fixation right foot on       4  Status post failed right foot reconstruction, requiring TMA   All hardware prior to TMA were removed   However, as in above, patient is status post screw fixation of right foot after TMA      5  DM, somewhat poorly controlled, with elevated hemoglobin A1c  Management per Medicine Service      Discussed with patient in detail regarding the above plan        Antibiotics:  Doxycycline  POD # 8     Subjective:  Patient is awake and alert   Confusion slowly improving  No pain in right foot  Temperature stays down   No chills    He is tolerating antibiotic well   No nausea, vomiting or diarrhea      Objective:  Vitals:  Temp:  [97 6 °F (36 4 °C)-98 5 °F (36 9 °C)] 98 3 °F (36 8 °C)  HR:  [63-98] 67  Resp:  [18-19] 18  BP: ()/(54-66) 105/66  SpO2:  [95 %-100 %] 98 %  Temp (24hrs), Av 1 °F (36 7 °C), Min:97 6 °F (36 4 °C), Max:98 5 °F (36 9 °C)  Current: Temperature: 98 3 °F (36 8 °C)    Physical Exam:     General: Awake, alert, cooperative, no distress  Confusion improving  Neck:  Supple  No mass  No lymphadenopathy  Lungs: Expansion symmetric, no rales, no wheezing, respirations unlabored  Heart:  Regular rate and rhythm, S1 and S2 normal, no murmur  Abdomen: Soft, nondistended, non-tender, bowel sounds active all four quadrants, no masses, no organomegaly  Extremities: Improved ankle/foot edema  Incision healing well  No erythema/warmth  Nontender to palpation  Skin:  No rash  Neuro: Moves all extremities  Invasive Devices     Peripheral Intravenous Line            Peripheral IV 04/20/21 Left Forearm less than 1 day          Drain            Closed/Suction Drain Right Thigh Bulb 15 Fr  7 days    External Urinary Catheter Small 4 days                Labs studies:   I have personally reviewed pertinent labs  Results from last 7 days   Lab Units 04/18/21  0442 04/16/21  0552 04/15/21  0809   POTASSIUM mmol/L 4 6 4 3 3 8   CHLORIDE mmol/L 113* 108 108   CO2 mmol/L 16* 26 24   BUN mg/dL 13 14 12   CREATININE mg/dL 0 79 0 84 0 82   EGFR ml/min/1 73sq m 92 89 90   CALCIUM mg/dL 8 6 9 0 8 4     Results from last 7 days   Lab Units 04/15/21  0448 04/14/21  0630 04/13/21  1412   WBC Thousand/uL 12 54* 12 84* 14 27*   HEMOGLOBIN g/dL 10 3* 9 6* 9 2*   PLATELETS Thousands/uL 227 210 218           Imaging Studies:   I have personally reviewed pertinent imaging study reports and images in PACS  EKG, Pathology, and Other Studies:   I have personally reviewed pertinent reports

## 2021-04-20 NOTE — PROGRESS NOTES
Progress Note - Geriatric Medicine   Bean Sandoval  73 y o  male MRN: [id number]  Unit/Bed#: [facility] 525-01 Encounter: [id number]      Assessment/Plan    Acute metabolic encephalopathy  -markedly improved likely near or at baseline  -continue treatment of contributing factors including possibleosteomyelitis right foot, AFib with RVR and sleep hygiene   -symptoms well controlled with Seroquel 12 5mg during day and 25mg HS, continue current regimen  -continue to monitor for QTC prolongation with use of Seroquel   -recommend discontinuation of Valium as had only used two doses since admission and did not respond favorably, would prefer to avoid use as possible given increased risk confusion and encephalopathy with use in mature adult population   -ensure pain is well controlled  -maintain normal circadian rhythm  -monitor for and correct any underlying metabolic derangements    Possible residual osteomyelitis right foot  -currently on antibiotic regimen doxycycline through 5/24 recommended by ID    DM-II with diabetic neuropathy  -blood sugars are mostly within goal 140-180  -continue to avoid drastic fluctuations    AFib with RVR  -heart rate well controlled with Cardizem and metoprolol  -anticoagulated with Eliquis    Ambulatory dysfunction  -continue mobilization as outlined by plastic surgery to allow for best healing potential  -agree with surgical team, working with PT/OT for upper body strength and mobility would likely be very beneficial for patient both physically and mentally    Impaired vision  -continue use of corrective lenses appropriate times    Impaired hearing  -continue to encourage use of hearing aids at all appropriate times  -use teach back method to ensure clear communication    Deconditioning/debility/frailty  -clinical frailty scale stage 6, moderately frail  -continue to encourage optimization nutritional intake and good control chronic medical conditions    Care coordination:  Rounded with Han Rom (RN)    Subjective:     Patient seen examined bedside where he is lying resting comfortably, reports he slept well overnight and has no acute pain this morning  He states his appetite fluctuates but is overall good  He is much less anxious than prior encounters and his only complaint this morning is he continues to feel like his leg is slipping off the bed when he is sleeping  Nursing reports that he has been much less anxious and overall continues to be significantly improved from last week  Tolerating Seroquel well  Review of Systems   Constitutional: Positive for appetite change (Fluctuates)  Negative for chills and fever  HENT: Negative for hearing loss and trouble swallowing  Eyes: Negative for visual disturbance  Respiratory: Negative for shortness of breath  Cardiovascular: Negative for chest pain  Gastrointestinal: Positive for constipation  Negative for diarrhea, nausea and vomiting  Endocrine: Negative  Genitourinary: Negative for difficulty urinating and dysuria  Musculoskeletal: Positive for gait problem  Skin: Positive for wound  Neurological: Negative for dizziness, light-headedness and headaches  Hematological: Negative  Psychiatric/Behavioral: Negative  All other systems reviewed and are negative  Objective:     Vitals: Blood pressure 123/62, pulse 84, temperature 98 °F (36 7 °C), temperature source Oral, resp  rate 18, height 6' 5" (1 956 m), weight 99 1 kg (218 lb 7 6 oz), SpO2 100 %  ,Body mass index is 25 91 kg/m²  Intake/Output Summary (Last 24 hours) at 4/20/2021 9315  Last data filed at 4/20/2021 0235  Gross per 24 hour   Intake 838 ml   Output 1000 ml   Net -162 ml     Current Medications: Reviewed    Physical Exam:   Physical Exam  Vitals signs and nursing note reviewed  Constitutional:       General: He is not in acute distress  Appearance: He is not ill-appearing        Comments: Elderly male sitting in bed resting comfortably   HENT: Head: Normocephalic and atraumatic  Nose: Nose normal       Mouth/Throat:      Mouth: Mucous membranes are moist    Eyes:      General: No scleral icterus  Right eye: No discharge  Left eye: No discharge  Conjunctiva/sclera: Conjunctivae normal    Neck:      Musculoskeletal: Neck supple  Comments: Trachea appears midline  Cardiovascular:      Rate and Rhythm: Tachycardia present  Rhythm irregular  Pulmonary:      Effort: No respiratory distress  Breath sounds: No wheezing  Abdominal:      Palpations: Abdomen is soft  Tenderness: There is no abdominal tenderness  Musculoskeletal:      Comments: Moves all 4 extremities spontaneously  Padding around right foot   Skin:     General: Skin is warm and dry  Neurological:      Mental Status: He is alert  Comments: Awake and alert, answers questions appropriately, no word-finding difficulties noted   Psychiatric:         Mood and Affect: Mood normal          Behavior: Behavior normal       Comments: Pleasant and cooperative, anxiety has significantly improved        Invasive Devices     Peripheral Intravenous Line            Peripheral IV 04/16/21 Left Antecubital 4 days          Drain            Closed/Suction Drain Right Thigh Bulb 15 Fr  7 days    External Urinary Catheter Small 3 days              Lab, Imaging and other studies: I have personally reviewed pertinent reports

## 2021-04-21 LAB
GLUCOSE SERPL-MCNC: 117 MG/DL (ref 65–140)
GLUCOSE SERPL-MCNC: 186 MG/DL (ref 65–140)
GLUCOSE SERPL-MCNC: 213 MG/DL (ref 65–140)
GLUCOSE SERPL-MCNC: 268 MG/DL (ref 65–140)

## 2021-04-21 PROCEDURE — NC001 PR NO CHARGE: Performed by: PLASTIC SURGERY

## 2021-04-21 PROCEDURE — 82948 REAGENT STRIP/BLOOD GLUCOSE: CPT

## 2021-04-21 PROCEDURE — 99232 SBSQ HOSP IP/OBS MODERATE 35: CPT | Performed by: INTERNAL MEDICINE

## 2021-04-21 RX ADMIN — ASPIRIN 325 MG ORAL TABLET 325 MG: 325 PILL ORAL at 08:10

## 2021-04-21 RX ADMIN — INSULIN LISPRO 1 UNITS: 100 INJECTION, SOLUTION INTRAVENOUS; SUBCUTANEOUS at 08:16

## 2021-04-21 RX ADMIN — INSULIN LISPRO 3 UNITS: 100 INJECTION, SOLUTION INTRAVENOUS; SUBCUTANEOUS at 18:00

## 2021-04-21 RX ADMIN — APIXABAN 5 MG: 5 TABLET, FILM COATED ORAL at 17:55

## 2021-04-21 RX ADMIN — QUETIAPINE FUMARATE 25 MG: 25 TABLET ORAL at 21:57

## 2021-04-21 RX ADMIN — ATORVASTATIN CALCIUM 40 MG: 40 TABLET, FILM COATED ORAL at 17:54

## 2021-04-21 RX ADMIN — DOXYCYCLINE 100 MG: 100 CAPSULE ORAL at 21:58

## 2021-04-21 RX ADMIN — INSULIN GLARGINE 15 UNITS: 100 INJECTION, SOLUTION SUBCUTANEOUS at 08:24

## 2021-04-21 RX ADMIN — METOPROLOL SUCCINATE 75 MG: 50 TABLET, EXTENDED RELEASE ORAL at 22:01

## 2021-04-21 RX ADMIN — MELATONIN TAB 3 MG 3 MG: 3 TAB at 21:58

## 2021-04-21 RX ADMIN — DILTIAZEM HYDROCHLORIDE 180 MG: 60 TABLET, FILM COATED ORAL at 08:11

## 2021-04-21 RX ADMIN — DOXYCYCLINE 100 MG: 100 CAPSULE ORAL at 08:10

## 2021-04-21 RX ADMIN — DILTIAZEM HYDROCHLORIDE 180 MG: 60 TABLET, FILM COATED ORAL at 17:55

## 2021-04-21 RX ADMIN — DOCUSATE SODIUM AND SENNOSIDES 1 TABLET: 8.6; 5 TABLET ORAL at 21:58

## 2021-04-21 RX ADMIN — INSULIN LISPRO 2 UNITS: 100 INJECTION, SOLUTION INTRAVENOUS; SUBCUTANEOUS at 11:47

## 2021-04-21 RX ADMIN — BACITRACIN 1 LARGE APPLICATION: 500 OINTMENT TOPICAL at 17:58

## 2021-04-21 RX ADMIN — QUETIAPINE FUMARATE 12.5 MG: 25 TABLET ORAL at 08:11

## 2021-04-21 RX ADMIN — GABAPENTIN 100 MG: 100 CAPSULE ORAL at 08:10

## 2021-04-21 RX ADMIN — BACITRACIN 1 LARGE APPLICATION: 500 OINTMENT TOPICAL at 08:16

## 2021-04-21 RX ADMIN — METOPROLOL SUCCINATE 75 MG: 50 TABLET, EXTENDED RELEASE ORAL at 08:12

## 2021-04-21 RX ADMIN — APIXABAN 5 MG: 5 TABLET, FILM COATED ORAL at 08:11

## 2021-04-21 RX ADMIN — DOCUSATE SODIUM 100 MG: 100 CAPSULE, LIQUID FILLED ORAL at 08:11

## 2021-04-21 NOTE — PROGRESS NOTES
Progress Note - Infectious Disease   Lauren Gonzalez  73 y o  male MRN: [id number]  Unit/Bed#: [facility] 525-01 Encounter: [id number]      Impression/Recommendations:  1  Possible residual osteomyelitis of right foot   Patient is status post screw fixation right foot on , with operative bone scraping culture growing Enterobacter   Patient has been on p o  Doxycycline since and is tolerating it well  Ingram Meuse, he had a persistent open foot wound   Patient is now status post flap closure of right foot   With successful flap closure, there is no further bone exposed   The best course of action at this point is to have patient continue antibiotic for 6 weeks after flap closure  Continue p o  Doxycycline  Treat x6 weeks after flap closure, through       2  Open right foot wound   Patient is status post flap closure, requiring return to OR twice for revision due to congestion   Wound is clean and healing well, without evidence of cellulitis  Wound care per Plastic surgery      3  Status post screw fixation right foot on       4  Status post failed right foot reconstruction, requiring TMA   All hardware prior to TMA were removed   However, as in above, patient is status post screw fixation of right foot after TMA      5  DM, somewhat poorly controlled, with elevated hemoglobin A1c  Management per Medicine Service      Discussed with patient in detail regarding the above plan        Antibiotics:  Doxycycline  POD # 9     Subjective:  Patient is awake and alert   Confusion very mild  No pain in right foot  Temperature stays down   No chills    He is tolerating antibiotic well   No nausea, vomiting or diarrhea      Objective:  Vitals:  Temp:  [97 4 °F (36 3 °C)-98 3 °F (36 8 °C)] 97 4 °F (36 3 °C)  HR:  [67-88] 88  Resp:  [18] 18  BP: ()/(54-77) 102/77  SpO2:  [97 %-98 %] 97 %  Temp (24hrs), Av 9 °F (36 6 °C), Min:97 4 °F (36 3 °C), Max:98 3 °F (36 8 °C)  Current: Temperature: (!) 97 4 °F (36 3 °C)    Physical Exam:     General: Awake, alert, cooperative, no distress  Very mild confusion   Neck:  Supple  No mass  No lymphadenopathy  Lungs: Expansion symmetric, no rales, no wheezing, respirations unlabored  Heart:  Regular rate and rhythm, S1 and S2 normal, no murmur  Abdomen: Soft, nondistended, non-tender, bowel sounds active all four quadrants, no masses, no organomegaly  Extremities: Improved right foot edema  Incision healing well, with minimal drainage  No erythema/warmth  Nontender to palpation  Skin:  No rash  Neuro: Moves all extremities  Invasive Devices     Peripheral Intravenous Line            Peripheral IV 04/20/21 Left Forearm 1 day          Drain            Closed/Suction Drain Right Thigh Bulb 15 Fr  8 days    External Urinary Catheter Small 5 days                Labs studies:   I have personally reviewed pertinent labs  Results from last 7 days   Lab Units 04/18/21  0442 04/16/21  0552 04/15/21  0809   POTASSIUM mmol/L 4 6 4 3 3 8   CHLORIDE mmol/L 113* 108 108   CO2 mmol/L 16* 26 24   BUN mg/dL 13 14 12   CREATININE mg/dL 0 79 0 84 0 82   EGFR ml/min/1 73sq m 92 89 90   CALCIUM mg/dL 8 6 9 0 8 4     Results from last 7 days   Lab Units 04/15/21  0448   WBC Thousand/uL 12 54*   HEMOGLOBIN g/dL 10 3*   PLATELETS Thousands/uL 227           Imaging Studies:   I have personally reviewed pertinent imaging study reports and images in PACS  EKG, Pathology, and Other Studies:   I have personally reviewed pertinent reports

## 2021-04-21 NOTE — NURSING NOTE
Devante Texted Paris Vega about patient's status and what do to with viox  Flap assessment has not changed since messaging Samuel pastor  awaiting response

## 2021-04-21 NOTE — NURSING NOTE
Patient called RN bedside to say that he heard a popping sound  Patient was found sideways in the bed and the viox was found next to patient's foot on the pillow  RN tried to place back on, spo2 is not reading correctly unless manually held   A and doppler is audible  Foot/ flap warm to touch and pink  Tiger texted Teo Fass to notify  Awaiting response back

## 2021-04-21 NOTE — PROGRESS NOTES
Progress Note - Plastic Surgery   Wing Pineda  73 y o  male MRN: [id number]  Unit/Bed#: [facility] 525-01 Encounter: [id number]    Assessment:  Principal Problem:    S/P transmetatarsal amputation of foot, right (HCC)  Active Problems:    Atrial fibrillation with RVR ([facility] )    Hyperlipidemia    Type 2 diabetes mellitus with diabetic neuropathy ([facility] )    Essential hypertension    Acquired absence of other right toe(s) ([facility] )    Deep disruption or dehiscence of operation wound    Ambulatory dysfunction    Diabetic ulcer of right 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, with fat layer exposed ([facility] )    Sleep disturbance    73 y o  male POD 8 from ALT reconstruction of R foot defect, subsequent takebacks on POD 0 and 1 for venous congestion  Overall doing well  Delirium appears mostly resolved  Clinically stable   VSS, A fib rate controlled   R foot free flap doing well     Plan:  - Continue Q4 hr flap, hamilton hugger  Ok to move to commode with leg elevated at all times  - Cont Eliquis, daily aspirin  - Cont regular diet, off IVF  - Appreciate assistance of Gerontology, Cards, ID and IM  - Doxy BID through 5/24  - Daily xeroform replacement to foot  - Will plan to start dangle protocol tomorrow  - Discussed with Dr Kelli Ochoa  - PT and OT: pt may transfer to recliner and commode with foot elevated  Can work upper extermity strength  Will likely need a trapeze on bed, etc   - Dispo: discussed with CM, working toward facility discharge on Friday    Lazaro Guillen MD  Resident, Plastic & Reconstructive Surgery  [phone] (c)     Subjective/Objective   Chief Complaint: R foot wound    Subjective: resting in bed  Pain controlled  Overnight pt inadvertently removed ViOptix from foot    Objective:     Blood pressure 102/54, pulse 72, temperature 97 9 °F (36 6 °C), temperature source Oral, resp  rate 18, height 6' 5" (1 956 m), weight 99 1 kg (218 lb 7 6 oz), SpO2 97 %  ,Body mass index is 25 91 kg/m²  Intake/Output Summary (Last 24 hours) at 4/21/2021 0636  Last data filed at 4/21/2021 0557  Gross per 24 hour   Intake 1140 ml   Output 1328 ml   Net -188 ml       Invasive Devices     Peripheral Intravenous Line            Peripheral IV 04/20/21 Left Forearm 1 day          Drain            Closed/Suction Drain Right Thigh Bulb 15 Fr  8 days    External Urinary Catheter Small 4 days                Physical Exam:   Gen: NAD  Neuro: Alert, oriented  CV: regular rate  Pulm: no respiratory distress  Abd: nondistended  GI: condom catheter  Ext: RLE free flap is pink, soft, warm  Cap refill is about 2 seconds  Micro flow  with strong venous doppler signal via A, no signal via B  Arterial and venous signals at both marking sutures on the flap and elsewhere  Drain with dark fluid output     Lab, Imaging and other studies:I have personally reviewed pertinent lab results      VTE Pharmacologic Prophylaxis: Sequential compression device (Venodyne), Eliquis   VTE Mechanical Prophylaxis: sequential compression device

## 2021-04-21 NOTE — CASE MANAGEMENT
CM made pt aware [facility] able to accept him when he is discharged pending insurance authorization and Covid test

## 2021-04-21 NOTE — PLAN OF CARE
Problem: Potential for Falls  Goal: Patient will remain free of falls  Description: INTERVENTIONS:  - Assess patient frequently for physical needs  -  Identify cognitive and physical deficits and behaviors that affect risk of falls    -  [place] fall precautions as indicated by assessment   - Educate patient/family on patient safety including physical limitations  - Instruct patient to call for assistance with activity based on assessment  - Modify environment to reduce risk of injury  - Consider OT/PT consult to assist with strengthening/mobility  Outcome: Progressing     Problem: PAIN - ADULT  Goal: Verbalizes/displays adequate comfort level or baseline comfort level  Description: Interventions:  - Encourage patient to monitor pain and request assistance  - Assess pain using appropriate pain scale  - Administer analgesics based on type and severity of pain and evaluate response  - Implement non-pharmacological measures as appropriate and evaluate response  - Consider cultural and social influences on pain and pain management  - Notify physician/advanced practitioner if interventions unsuccessful or patient reports new pain  Outcome: Progressing     Problem: INFECTION - ADULT  Goal: Absence or prevention of progression during hospitalization  Description: INTERVENTIONS:  - Assess and monitor for signs and symptoms of infection  - Monitor lab/diagnostic results  - Monitor all insertion sites, i e  indwelling lines, tubes, and drains  - Monitor endotracheal if appropriate and nasal secretions for changes in amount and color  - [place] appropriate cooling/warming therapies per order  - Administer medications as ordered  - Instruct and encourage patient and family to use good hand hygiene technique  - Identify and instruct in appropriate isolation precautions for identified infection/condition  Outcome: Progressing     Problem: SAFETY ADULT  Goal: Patient will remain free of falls  Description: INTERVENTIONS:  - Assess patient frequently for physical needs  -  Identify cognitive and physical deficits and behaviors that affect risk of falls    -  [place] fall precautions as indicated by assessment   - Educate patient/family on patient safety including physical limitations  - Instruct patient to call for assistance with activity based on assessment  - Modify environment to reduce risk of injury  - Consider OT/PT consult to assist with strengthening/mobility  Outcome: Progressing  Goal: Maintain or return to baseline ADL function  Description: INTERVENTIONS:  -  Assess patient's ability to carry out ADLs; assess patient's baseline for ADL function and identify physical deficits which impact ability to perform ADLs (bathing, care of mouth/teeth, toileting, grooming, dressing, etc )  - Assess/evaluate cause of self-care deficits   - Assess range of motion  - Assess patient's mobility; develop plan if impaired  - Assess patient's need for assistive devices and provide as appropriate  - Encourage maximum independence but intervene and supervise when necessary  - Involve family in performance of ADLs  - Assess for home care needs following discharge   - Consider OT consult to assist with ADL evaluation and planning for discharge  - Provide patient education as appropriate  Outcome: Progressing  Goal: Maintain or return mobility status to optimal level  Description: INTERVENTIONS:  - Assess patient's baseline mobility status (ambulation, transfers, stairs, etc )    - Identify cognitive and physical deficits and behaviors that affect mobility  - Identify mobility aids required to assist with transfers and/or ambulation (gait belt, sit-to-stand, lift, walker, cane, etc )  - [place] fall precautions as indicated by assessment  - Record patient progress and toleration of activity level on Mobility SBAR; progress patient to next Phase/Stage  - Instruct patient to call for assistance with activity based on assessment  - Consider rehabilitation consult to assist with strengthening/weightbearing, etc   Outcome: Progressing     Problem: DISCHARGE PLANNING  Goal: Discharge to home or other facility with appropriate resources  Description: INTERVENTIONS:  - Identify barriers to discharge w/patient and caregiver  - Arrange for needed discharge resources and transportation as appropriate  - Identify discharge learning needs (meds, wound care, etc )  - Arrange for interpretive services to assist at discharge as needed  - Refer to Case Management Department for coordinating discharge planning if the patient needs post-hospital services based on physician/advanced practitioner order or complex needs related to functional status, cognitive ability, or social support system  Outcome: Progressing     Problem: Knowledge Deficit  Goal: Patient/family/caregiver demonstrates understanding of disease process, treatment plan, medications, and discharge instructions  Description: Complete learning assessment and assess knowledge base  Interventions:  - Provide teaching at level of understanding  - Provide teaching via preferred learning methods  Outcome: Progressing     Problem: Nutrition/Hydration-ADULT  Goal: Nutrient/Hydration intake appropriate for improving, restoring or maintaining nutritional needs  Description: Monitor and assess patient's nutrition/hydration status for malnutrition  Collaborate with interdisciplinary team and initiate plan and interventions as ordered  Monitor patient's weight and dietary intake as ordered or per policy  Utilize nutrition screening tool and intervene as necessary  Determine patient's food preferences and provide high-protein, high-caloric foods as appropriate       INTERVENTIONS:  - Monitor oral intake, urinary output, labs, and treatment plans  - Assess nutrition and hydration status and recommend course of action  - Evaluate amount of meals eaten  - Assist patient with eating if necessary   - Allow adequate time for meals  - Recommend/ encourage appropriate diets, oral nutritional supplements, and vitamin/mineral supplements  - Order, calculate, and assess calorie counts as needed  - Recommend, monitor, and adjust tube feedings and TPN/PPN based on assessed needs  - Assess need for intravenous fluids  - Provide specific nutrition/hydration education as appropriate  - Include patient/family/caregiver in decisions related to nutrition  Outcome: Progressing     Problem: Prexisting or High Potential for Compromised Skin Integrity  Goal: Skin integrity is maintained or improved  Description: INTERVENTIONS:  - Identify patients at risk for skin breakdown  - Assess and monitor skin integrity  - Assess and monitor nutrition and hydration status  - Monitor labs   - Assess for incontinence   - Turn and reposition patient  - Assist with mobility/ambulation  - Relieve pressure over bony prominences  - Avoid friction and shearing  - Provide appropriate hygiene as needed including keeping skin clean and dry  - Evaluate need for skin moisturizer/barrier cream  - Collaborate with interdisciplinary team   - Patient/family teaching  - Consider wound care consult   Outcome: Progressing     Problem: CONFUSION/THOUGHT DISTURBANCE  Goal: Thought disturbances (confusion, delirium, depression, dementia or psychosis) are managed to maintain or return to baseline mental status and functional level  Description: INTERVENTIONS:  - Assess for possible contributors to  thought disturbance, including but not limited to medications, infection, impaired vision or hearing, underlying metabolic abnormalities, dehydration, respiratory compromise,  psychiatric diagnoses and notify attending PHYSICAN/AP  - Monitor and intervene to maintain adequate nutrition, hydration, elimination, sleep and activity  - Decrease environmental stimuli, including noise as appropriate  - Provide frequent contacts to provide refocusing, direction and reassurance as needed  Approach patient calmly with eye contact and at their level    - [place] high risk fall precautions, aspiration precautions and other safety measures, as indicated  - If delirium suspected, notify physician/AP of change in condition and request immediate in-person evaluation  - Pursue consults as appropriate including Geriatric (campus dependent), OT for cognitive evaluation/activity planning, psychiatric, pastoral care, etc   Outcome: Progressing     Problem: METABOLIC, FLUID AND ELECTROLYTES - ADULT  Goal: Electrolytes maintained within normal limits  Description: INTERVENTIONS:  - Monitor labs and assess patient for signs and symptoms of electrolyte imbalances  - Administer electrolyte replacement as ordered  - Monitor response to electrolyte replacements, including repeat lab results as appropriate  - Instruct patient on fluid and nutrition as appropriate  Outcome: Progressing  Goal: Glucose maintained within target range  Description: INTERVENTIONS:  - Monitor Blood Glucose as ordered  - Assess for signs and symptoms of hyperglycemia and hypoglycemia  - Administer ordered medications to maintain glucose within target range  - Assess nutritional intake and initiate nutrition service referral as needed  Outcome: Progressing     Problem: SKIN/TISSUE INTEGRITY - ADULT  Goal: Skin integrity remains intact  Description: INTERVENTIONS  - Identify patients at risk for skin breakdown  - Assess and monitor skin integrity  - Assess and monitor nutrition and hydration status  - Monitor labs (i e  albumin)  - Assess for incontinence   - Turn and reposition patient  - Assist with mobility/ambulation  - Relieve pressure over bony prominences  - Avoid friction and shearing  - Provide appropriate hygiene as needed including keeping skin clean and dry  - Evaluate need for skin moisturizer/barrier cream  - Collaborate with interdisciplinary team (i e  Nutrition, Rehabilitation, etc )   - Patient/family teaching  Outcome: Progressing  Goal: Incision(s), wounds(s) or drain site(s) healing without S/S of infection  Description: INTERVENTIONS  - Assess and document risk factors for skin impairment   - Assess and document dressing, incision, wound bed, drain sites and surrounding tissue  - Consider nutrition services referral as needed  - Oral mucous membranes remain intact  - Provide patient/ family education  Outcome: Progressing

## 2021-04-22 LAB
GLUCOSE SERPL-MCNC: 134 MG/DL (ref 65–140)
GLUCOSE SERPL-MCNC: 164 MG/DL (ref 65–140)
GLUCOSE SERPL-MCNC: 185 MG/DL (ref 65–140)
GLUCOSE SERPL-MCNC: 259 MG/DL (ref 65–140)

## 2021-04-22 PROCEDURE — 87205 SMEAR GRAM STAIN: CPT | Performed by: SURGERY

## 2021-04-22 PROCEDURE — 87075 CULTR BACTERIA EXCEPT BLOOD: CPT | Performed by: SURGERY

## 2021-04-22 PROCEDURE — NC001 PR NO CHARGE: Performed by: PLASTIC SURGERY

## 2021-04-22 PROCEDURE — 82948 REAGENT STRIP/BLOOD GLUCOSE: CPT

## 2021-04-22 PROCEDURE — 87186 SC STD MICRODIL/AGAR DIL: CPT | Performed by: SURGERY

## 2021-04-22 PROCEDURE — 87077 CULTURE AEROBIC IDENTIFY: CPT | Performed by: SURGERY

## 2021-04-22 PROCEDURE — 87070 CULTURE OTHR SPECIMN AEROBIC: CPT | Performed by: SURGERY

## 2021-04-22 PROCEDURE — 99232 SBSQ HOSP IP/OBS MODERATE 35: CPT | Performed by: INTERNAL MEDICINE

## 2021-04-22 PROCEDURE — 97530 THERAPEUTIC ACTIVITIES: CPT

## 2021-04-22 PROCEDURE — 97535 SELF CARE MNGMENT TRAINING: CPT

## 2021-04-22 RX ORDER — CEPHALEXIN 500 MG/1
500 CAPSULE ORAL EVERY 6 HOURS SCHEDULED
Status: DISCONTINUED | OUTPATIENT
Start: 2021-04-22 | End: 2021-04-23

## 2021-04-22 RX ADMIN — QUETIAPINE FUMARATE 12.5 MG: 25 TABLET ORAL at 07:57

## 2021-04-22 RX ADMIN — INSULIN LISPRO 1 UNITS: 100 INJECTION, SOLUTION INTRAVENOUS; SUBCUTANEOUS at 07:59

## 2021-04-22 RX ADMIN — QUETIAPINE FUMARATE 25 MG: 25 TABLET ORAL at 21:47

## 2021-04-22 RX ADMIN — INSULIN LISPRO 3 UNITS: 100 INJECTION, SOLUTION INTRAVENOUS; SUBCUTANEOUS at 11:52

## 2021-04-22 RX ADMIN — METOPROLOL SUCCINATE 75 MG: 50 TABLET, EXTENDED RELEASE ORAL at 21:46

## 2021-04-22 RX ADMIN — METOPROLOL SUCCINATE 75 MG: 50 TABLET, EXTENDED RELEASE ORAL at 07:56

## 2021-04-22 RX ADMIN — CEPHALEXIN 500 MG: 500 CAPSULE ORAL at 17:11

## 2021-04-22 RX ADMIN — DOCUSATE SODIUM 100 MG: 100 CAPSULE, LIQUID FILLED ORAL at 17:11

## 2021-04-22 RX ADMIN — INSULIN GLARGINE 15 UNITS: 100 INJECTION, SOLUTION SUBCUTANEOUS at 07:59

## 2021-04-22 RX ADMIN — MELATONIN TAB 3 MG 3 MG: 3 TAB at 21:47

## 2021-04-22 RX ADMIN — DOCUSATE SODIUM 100 MG: 100 CAPSULE, LIQUID FILLED ORAL at 07:56

## 2021-04-22 RX ADMIN — BACITRACIN 1 LARGE APPLICATION: 500 OINTMENT TOPICAL at 17:10

## 2021-04-22 RX ADMIN — ATORVASTATIN CALCIUM 40 MG: 40 TABLET, FILM COATED ORAL at 17:11

## 2021-04-22 RX ADMIN — BACITRACIN 1 LARGE APPLICATION: 500 OINTMENT TOPICAL at 07:59

## 2021-04-22 RX ADMIN — LISINOPRIL 40 MG: 20 TABLET ORAL at 07:58

## 2021-04-22 RX ADMIN — DOXYCYCLINE 100 MG: 100 CAPSULE ORAL at 07:56

## 2021-04-22 RX ADMIN — APIXABAN 5 MG: 5 TABLET, FILM COATED ORAL at 07:57

## 2021-04-22 RX ADMIN — DILTIAZEM HYDROCHLORIDE 180 MG: 60 TABLET, FILM COATED ORAL at 17:10

## 2021-04-22 RX ADMIN — GABAPENTIN 100 MG: 100 CAPSULE ORAL at 07:58

## 2021-04-22 RX ADMIN — DILTIAZEM HYDROCHLORIDE 180 MG: 60 TABLET, FILM COATED ORAL at 07:58

## 2021-04-22 RX ADMIN — APIXABAN 5 MG: 5 TABLET, FILM COATED ORAL at 17:11

## 2021-04-22 RX ADMIN — INSULIN LISPRO 1 UNITS: 100 INJECTION, SOLUTION INTRAVENOUS; SUBCUTANEOUS at 22:09

## 2021-04-22 RX ADMIN — DOCUSATE SODIUM AND SENNOSIDES 1 TABLET: 8.6; 5 TABLET ORAL at 21:46

## 2021-04-22 RX ADMIN — ASPIRIN 325 MG ORAL TABLET 325 MG: 325 PILL ORAL at 07:57

## 2021-04-22 NOTE — PROGRESS NOTES
Progress Note - Infectious Disease   Brianda Hinkle  73 y o  male MRN: [id number]  Unit/Bed#: [facility] 525-01 Encounter: [id number]      Impression/Recommendations:  1  Possible residual osteomyelitis of right foot   Patient is status post screw fixation right foot on 03/04, with operative bone scraping culture growing Enterobacter   Patient has been on p o  Doxycycline since and is tolerating it well  Carlos Sarika, he had a persistent open foot wound   Patient is now status post flap closure of right foot   With successful flap closure, there is no further bone exposed   The best course of action at this point is to have patient continue antibiotic for 6 weeks after flap closure  Continue p o  Doxycycline  Treat x6 weeks after flap closure, through 5/24      2  Open right foot wound   Patient is status post flap closure, requiring return to OR twice for revision due to congestion   Wound is clean and healing well, without evidence of cellulitis  However, there was purulent drainage from incision earlier today, but not at present  Culture of purulence was done  Pending wound culture, will add Keflex  Add Keflex  Follow-up on wound culture  Wound care per Plastic surgery      3  Status post screw fixation right foot on 03/04      4  Status post failed right foot reconstruction, requiring TMA   All hardware prior to TMA were removed   However, as in above, patient is status post screw fixation of right foot after TMA      5  DM, somewhat poorly controlled, with elevated hemoglobin A1c  Management per Medicine Service      Discussed with patient in detail regarding the above plan     Discussed with Plastic surgery service      Antibiotics:  Doxycycline  POD # 10     Subjective:  Patient is awake and alert   Confusion very mild and improving  No pain in right foot  Temperature stays down   No chills    He is tolerating antibiotic well   No nausea, vomiting or diarrhea      Objective:  Vitals:  Temp:  [98 1 °F (36 7 °C)-98 7 °F (37 1 °C)] 98 2 °F (36 8 °C)  HR:  [77-82] 82  Resp:  [16-17] 16  BP: (102-141)/(52-77) 141/72  SpO2:  [97 %-98 %] 98 %  Temp (24hrs), Av 3 °F (36 8 °C), Min:98 1 °F (36 7 °C), Max:98 7 °F (37 1 °C)  Current: Temperature: 98 2 °F (36 8 °C)    Physical Exam:     General: Awake, alert, cooperative, no distress  Neck:  Supple  No mass  No lymphadenopathy  Lungs: Expansion symmetric, no rales, no wheezing, respirations unlabored  Heart:  Regular rate and rhythm, S1 and S2 normal, no murmur  Abdomen: Soft, nondistended, non-tender, bowel sounds active all four quadrants, no masses, no organomegaly  Extremities: Improved foot/ankle edema  Incision with no purulence at present  No erythema/warmth  Nontender  Skin:  No rash  Neuro: Moves all extremities  Invasive Devices     Peripheral Intravenous Line            Peripheral IV 21 Left Forearm 2 days          Drain            External Urinary Catheter Small 6 days                Labs studies:   I have personally reviewed pertinent labs  Results from last 7 days   Lab Units 21  0442 21  0552   POTASSIUM mmol/L 4 6 4 3   CHLORIDE mmol/L 113* 108   CO2 mmol/L 16* 26   BUN mg/dL 13 14   CREATININE mg/dL 0 79 0 84   EGFR ml/min/1 73sq m 92 89   CALCIUM mg/dL 8 6 9 0               Imaging Studies:   I have personally reviewed pertinent imaging study reports and images in PACS  EKG, Pathology, and Other Studies:   I have personally reviewed pertinent reports

## 2021-04-22 NOTE — UTILIZATION REVIEW
Continued Stay Review    Date: 4/22/21 Thursday                         Current Patient Class: Inpatient    Current Level of Care: Acute Med Surg    HPI:   73year old male with DM2, DM neuropathy, without long-term current use of insulin, diabetic ulcer of right midfoot with fat layer exposed s/p foot fusion and reconstruction with external fixator on 1/46/75 - complicated by post op infection requiring hematoma evacuation, fasciotomy for compartment syndrome and removal of the external fixator  TMA done om 2/22/21  Now with large secondary defect with exposure of bone  Admitted 4/7/21 for scheduled surgery - plastic surgery planned procedure  to salvage his trans met amp stump  *As patient wants to attempt limb salvage - plan for right foot excisional debridement , closure with free tissue transfer      4/7 OP Procedure:  DEBRIDEMENT RIGHT FOOT (Right)  Findings:  Fibrinous debris in both medial and lateral aspects of foot  Free flap reconstruction aborted due to atrial fibrillation with RVR and hypotension      4/12 OP Procedure: DEBRIDEMENT LOWER EXTREMITY ([facility] OUT) (Right)  NEURECTOMY CUTANEOUS BRANCH SUPERFICIAL PERONEAL NERVE (RIGHT)   RECONSTRUCTION MICROSURGICAL W/ FREE ANTEROLATERAL THIGH FLAP (Right)  FLAP EVALUATION WITH ICG LYMPHANGIOGRAPHY       4/13:   Cardiology Quick Note Able to wean down cardizem gtt   HR well controlled  Plastic sx note:  POD 1 from ALT reconstruction of R foot defect, subsequent takeback on POD 0 for venous congestion  Plan for OR this am for exploration   Possible need for vein graft , sec free flap   4/19:  POD # 6 s/p debridement with flap closure of R foot with possible residual osteomyelitis  confusion, slowly improving  Continue po doxy tx for 6 wks post op (thru 5/24) per ID  Per plastic surgery - continue bedrest for now, ok to move to commode with leg elevated at all times         4/22 Plastic Sx:  POD 9 from ALT reconstruction of R foot defect, subsequent takebacks on POD 0 and 1 for venous congestion     Overall doing well  Delirium appears mostly resolved  Atrial fib rate controlled   R foot free flap doing well but with some erythema at the medial aspect appreciated, and seropurulent drainage     Plan:  - Continue Q4 hr flap, hamilton gomez  Ok to move to commode with leg elevated at all times  - Cont Eliquis, daily aspirin  - Cont regular diet, off IVF  - Appreciate assistance of Gerontology, Cards, ID and IM  - Doxy BID through 5/24   Discussed new erythema and seropurulent drainage with ID, will send culture swab  - Daily xeroform replacement to foot  - Continue dangle per protocol:  Post Operative Dangle Protocol:    Vital Signs:  04/22/21 07:12:10  98 2 °F (36 8 °C)  82  16  141/72  95  98 %  --  --   04/22/21 00:15:21  98 1 °F (36 7 °C)  --  17  111/66  81  --  --  --   04/21/21 21:55:32  --  --  --  113/66  82  --  --  --   04/21/21 1755  --  --  --  102/77  --  --  --  --   04/21/21 1500  98 7 °F (37 1 °C)  77  16  107/52  --  97 %  --       I/O 04/20 0701   04/21 0700 04/21 0701   04/22 0700 04/22 0701      P O  1140 340 462   NG/GT 0     Total Intake(mL/kg) 1140 (11 5) 340 (3 4) 462 (4 7)   Urine (mL/kg/hr) 1300 (0 5) 550 (0 2)    Drains 28 10 20   Total Output 1328 560 20   Net -188 -220 +442     Pertinent Labs/Diagnostic Results:   Results from last 7 days   Lab Units 04/18/21  0442 04/16/21  0552   SODIUM mmol/L 135* 140   POTASSIUM mmol/L 4 6 4 3   CHLORIDE mmol/L 113* 108   CO2 mmol/L 16* 26   ANION GAP mmol/L 6 6   BUN mg/dL 13 14   CREATININE mg/dL 0 79 0 84   EGFR ml/min/1 73sq m 92 89   CALCIUM mg/dL 8 6 9 0     Results from last 7 days   Lab Units 04/22/21  1057 04/22/21  0627 04/21/21  2127 04/21/21  1613 04/21/21  1055 04/21/21  0638 04/20/21  2110 04/20/21  1555 04/20/21  1042 04/20/21  0635 04/19/21  2103 04/19/21  1626   POC GLUCOSE mg/dl 259* 164* 117 268* 213* 186* 248* 128 195* 149* 146* 191*     Results from last 7 days   Lab Units 04/18/21  0442 04/16/21  0552   GLUCOSE RANDOM mg/dL 129 141*     Diet Regular; Regular House; Consistent Carbohydrate Diet Level 3 (6 carb servings/90 grams CHO/meal)    Dietary Nutritional Supplements Glucerna daily with dinner      Scheduled Medications:  apixaban, 5 mg, Oral, BID  aspirin, 325 mg, Oral, Daily  atorvastatin, 40 mg, Oral, Daily With Dinner  bacitracin, 1 large application, Topical, BID  cephalexin, 500 mg, Oral, Q6H SUNG  diltiazem, 180 mg, Oral, BID  docusate sodium, 100 mg, Oral, BID  gabapentin, 100 mg, Oral, Daily  insulin glargine, 15 Units, Subcutaneous, QAM  insulin lispro, 1-6 Units, Subcutaneous, 4x Daily (AC & HS)  insulin lispro, 7 Units, Subcutaneous, TID With Meals  lisinopril, 40 mg, Oral, Daily  melatonin, 3 mg, Oral, HS  metoprolol succinate, 75 mg, Oral, BID  QUEtiapine, 12 5 mg, Oral, QAM  QUEtiapine, 25 mg, Oral, HS  senna-docusate sodium, 1 tablet, Oral, HS    PRN Meds:  acetaminophen, 650 mg, Oral, Q4H PRN  diazepam, 5 mg, Oral, Q6H PRN  HYDROmorphone, 0 5 mg, Intravenous, Q2H PRN  ondansetron, 4 mg, Intravenous, Q6H PRN  oxyCODONE, 10 mg, Oral, Q4H PRN  oxyCODONE, 5 mg, Oral, Q4H PRN    Discharge Plan: To be determined   Inpatient Case Management following for all discharge needs    Network Utilization Review Department  ATTENTION: Please call with any questions or concerns to [phone] and carefully listen to the prompts so that you are directed to the right person  All voicemails are confidential   [facility] all requests for admission clinical reviews, approved or denied determinations and any other requests to dedicated fax number below belonging to the campus where the patient is receiving treatment   List of dedicated fax numbers for the Facilities:  [address] [place] [address] DENIALS (Administrative/Medical Necessity) [phone]   [address] (Maternity/NICU/Pediatrics) [address] [facility] [id number] [facility] [address] [address] [place] [address] [place] [address] [place] [address] [phone]

## 2021-04-22 NOTE — PROGRESS NOTES
Progress Note - Plastic Surgery   Corona Rico  73 y o  male MRN: [id number]  Unit/Bed#: [facility] 525-01 Encounter: [id number]    Assessment:  Principal Problem:    S/P transmetatarsal amputation of foot, right (HCC)  Active Problems:    Atrial fibrillation with RVR ([facility] )    Hyperlipidemia    Type 2 diabetes mellitus with diabetic neuropathy ([facility] )    Essential hypertension    Acquired absence of other right toe(s) ([facility] )    Deep disruption or dehiscence of operation wound    Ambulatory dysfunction    Diabetic ulcer of right 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, with fat layer exposed ([facility] )    Sleep disturbance    73 y o  male POD 9 from ALT reconstruction of R foot defect, subsequent takebacks on POD 0 and 1 for venous congestion  Overall doing well  Delirium appears mostly resolved  Clinically stable    VSS, A fib rate controlled   R foot free flap doing well but with some erythema at the medial aspect appreciated, and seropurulent drainage    Plan:  - Continue Q4 hr flap, hamilton gomez  Ok to move to commode with leg elevated at all times  - Cont Eliquis, daily aspirin  - Cont regular diet, off IVF  - Appreciate assistance of Gerontology, Cards, ID and IM  - Doxy BID through 5/24  Discussed new erythema and seropurulent drainage with ID, will send culture swab  - Daily xeroform replacement to foot  - Continue dangle per protocol:  Post Operative Dangle Protocol:    Please "dangle" the patient's RLE for the dates and times listed below  OK to have the heel rest on a pillow/chux pad   Immediately elevate leg for any darker/purple discoloration of flap    4/22: 5 min 2x a day  4/23: 5 min 3x a day  4/24: 10 min 2x a day  4/25: 10 min 3x a day  4/26, 4/27: 15 min 2x a day  4/28, 4/29: 15 min 3x a day  4/30, 5/1: 20 min 2x a day  5/2, 5/3: 20 min 3x a day  5/4: 30 min once  5/5, 5/6: 30 min 2x a day  5/7, 5/8: 30 min 3x day  5/9: 45 min once  5/10, 5/11: 45 min 2x a day  5/12 and until cleared by Dr Keagan Abrams for longer: 45 min 3x a day    Again, immediately elevate leg for any darker/purple discoloration of flap  - Discussed with Dr Soelr Doniphan  - PT and OT: pt may transfer to recliner and commode with foot elevated  Can work upper extermity strength  Will likely need a trapeze on bed, etc   - Dispo: discussed with CM, working toward facility discharge on Friday    Tiffany Wright MD  Resident, Plastic & Reconstructive Surgery  [phone] (c)     Subjective/Objective   Chief Complaint: R foot wound    Subjective: resting in bed  Pain controlled  Objective:     Blood pressure 141/72, pulse 82, temperature 98 2 °F (36 8 °C), temperature source Oral, resp  rate 16, height 6' 5" (1 956 m), weight 99 1 kg (218 lb 7 6 oz), SpO2 98 %  ,Body mass index is 25 91 kg/m²  Intake/Output Summary (Last 24 hours) at 4/22/2021 0848  Last data filed at 4/21/2021 1952  Gross per 24 hour   Intake 160 ml   Output 300 ml   Net -140 ml       Invasive Devices     Peripheral Intravenous Line            Peripheral IV 04/20/21 Left Forearm 2 days          Drain            Closed/Suction Drain Right Thigh Bulb 15 Fr  9 days    External Urinary Catheter Small 5 days                Physical Exam:   Gen: NAD  Neuro: Alert, oriented  CV: regular rate  Pulm: no respiratory distress  Abd: nondistended  GI: condom catheter  Ext: RLE free flap is pink, soft, warm  Cap refill is about 2 seconds  Micro flow  with strong venous doppler signal via A, no signal via B  New seropurulent drainage and erythema at the distal medial aspect           Lab, Imaging and other studies:I have personally reviewed pertinent lab results      VTE Pharmacologic Prophylaxis: Sequential compression device (Venodyne), Eliquis   VTE Mechanical Prophylaxis: sequential compression device

## 2021-04-22 NOTE — OCCUPATIONAL THERAPY NOTE
Occupational Therapy Treatment Note      Dulce Nuno     4/22/2021    Principal Problem:    S/P transmetatarsal amputation of foot, right (HCC)  Active Problems:    Hyperlipidemia    Type 2 diabetes mellitus with diabetic neuropathy (HCC)    Atrial fibrillation with RVR ([address] )    Essential hypertension    Acquired absence of other right toe(s) ([address] )    Deep disruption or dehiscence of operation wound    Ambulatory dysfunction    Diabetic ulcer of right 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, with fat layer exposed ([address] )    Sleep disturbance      Past Medical History:   Diagnosis Date    Arthritis     Atrial fibrillation ([address] )     Diagnosed 11/2018    Benign prostate hyperplasia 04/2002    Cellulitis     right lower leg     Colon polyp 2006    Diabetes mellitus ([address] )     Hearing aid worn     bilat    Hearing loss     90% loss left ear and 40% right ear    History of clubfoot     "since birth"    History of pneumonia     History of TIA (transient ischemic attack) 04/25/2017 11/30/18 pt denies    Hyperlipidemia 5/15/2014    Hypertension     Infectious viral hepatitis     "cant remember type"    Irregular heart beat     Neuropathy     both feet    Osteomyelitis ([address] )     right great toe    Right middle lobe pulmonary nodule 11/6/2018    Seasickness     Teeth missing     Type 2 diabetes mellitus with diabetic neuropathy ([address] ) 11/6/2018    Wears glasses     reading    Wound, open     bottom of right foot       Past Surgical History:   Procedure Laterality Date    BUNIONECTOMY Right 12/4/2018    Procedure: 5TH METATARSAL BONE PARTIAL RESECTION, FULL THICKNESS DEBRIDEMENT OF DIABETIC ULCER;  Surgeon: Becky Matthews DPM;  [facility];  Service: Podiatry    CLUB FOOT RELEASE Bilateral     COLONOSCOPY      EXTERNAL FIXATOR APPLICATION Right 7/86/3563    Procedure: Application multiplane external fixation;  Surgeon: Flower Hidalgo DPM;  [facility];  Service: Podiatry  FOOT HARDWARE REMOVAL Right 2/17/2021    Procedure: REMOVAL EXTERNAL FIXATOR;  Surgeon: Christine Em DPM;  [facility];  Service: Podiatry    INCISION AND DRAINAGE OF WOUND Right 2/17/2021    Procedure: INCISION AND DRAINAGE (I&D) EXTREMITY;  Surgeon: Christine Em DPM;  [facility];  Service: Podiatry    ORIF FOOT FRACTURE Right 3/4/2021    Procedure: VAC PLACEMENT; SCREW FIXATION RIGHT FOOT FUSION;  Surgeon: Christine Em DPM;  [facility];  Service: Podiatry    [address] Right 2/22/2021    Procedure: AMPUTATION TRANSMETATARSAL (TMA), REMOVAL NAIL IM T2 ICF HARDWARE;  Surgeon: Christine Em DPM;  [facility];  Service: Podiatry    IN FUSION FOOT BONES,MIDTARSAL,OSTEOTMY Right 2/12/2021    Procedure: foot ARTHRODESIS/FUSION;  Surgeon: Christine Em DPM;  [facility];  Service: Podiatry    IN LENGTH/SHORT LEG/ANKL TENDON,SINGLE Right 2/12/2021    Procedure: LENGTHEN TIBIAL TENDON, TRANS HEEL CORD;  Surgeon: Christine Em DPM;  [facility];  Service: Podiatry    IN MUSCLE-SKIN FLAP,TRUNK Right 4/12/2021    Procedure: RECONSTRUCTION MICROSURGICAL W/ FREE FLAP;  Surgeon: Mely Weiner MD;  [facility];  Service: Plastics    IN MUSCLE-SKIN FLAP,TRUNK Right 4/12/2021    Procedure: TAKEBACK RECONSTRUCTION MICROSURGICAL W/ FREE FLAP;  Surgeon: Mely Weiner MD;  [facility];  Service: Plastics    IN MUSCLE-SKIN FLAP,TRUNK Right 4/13/2021    Procedure: RECONSTRUCTION MICROSURGICAL W/ FREE FLAP, RE EXPLORATION, MICRO VASCULAR REVISION;  Surgeon: Mely Weiner MD;  [facility];  Service: Plastics    IN PART [address] BONE METATARSAL HEAD,EA Right 12/29/2020    Procedure: EXCISION EXOSTOSIS;  Surgeon: Christine Em DPM;  [facility];  Service: Podiatry    IN SECD CLOS SURG WND EXTEN/COMPLIC Right 33/04/9142    Procedure: PRIMARY DELAYED CLOSURE; Surgeon: Julien Juarez DPM;  [facility];  Service: Podiatry    TOE AMPUTATION Right     partial great toe    TONSILLECTOMY      VAC DRESSING APPLICATION Right 3/8/5150    Procedure: APPLICATION VAC DRESSING EXTREMITY;  Surgeon: Julien Adam MD;  [facility];  Service: Plastics    [address] Right 3/1/2021    Procedure: DEBRIDEMENT LOWER EXTREMITY ([address] OUT); Surgeon: Julien Adam MD;  [facility];  Service: Plastics    WOUND DEBRIDEMENT Right 4/7/2021    Procedure: DEBRIDEMENT RIGHT FOOT;  Surgeon: Julien Adam MD;  [facility];  Service: Plastics    WOUND DEBRIDEMENT Right 4/12/2021    Procedure: DEBRIDEMENT LOWER EXTREMITY [facility] OUT); Surgeon: Julien Adam MD;  [facility];  Service: Plastics        04/22/21 1100   OT Last Visit   OT Visit Date 04/22/21   Note Type   Note Type Treatment   Restrictions/Precautions   Weight Bearing Precautions Per Order Yes   RLE Weight Bearing Per Order NWB  (s/p plastic sx)   LLE Weight Bearing Per Order WBAT   Braces or Orthoses Other (Comment)  (hamilton hugger; flap doppler)   Other Precautions Impulsive;Cognitive; Chair Alarm; Bed Alarm;Multiple lines;Telemetry; Fall Risk;Pain  (per plastic sx 4/22, Pt may transfer OOB w/ RLE ELEVATED)   Lifestyle   Autonomy pt reports being independent w basic self care, wife assist as needed  also has home care   Reciprocal Relationships supportive wife   Pain Assessment   Pain Assessment Tool Pain Assessment not indicated - pt denies pain   Pain Score No Pain   ADL   Where Assessed   (declines participation in ADLs)   Functional Standing Tolerance   Time <20 seconds   Activity Static standing at EOB w/ RW w/ RLE elevated  Pt unable to maintain plastic sx precautions, required immediate sitting rest break  Bed Mobility   Supine to Sit 4  Minimal assistance   Additional items Assist x 1; Increased time required;LE management;Verbal cues; Bedrails  (A for RLE management to maintain elevated, per plastic sx)   Sit to Supine 4  Minimal assistance   Additional items Assist x 1; Increased time required;LE management;Verbal cues; Bedrails  (A for RLE management to maintain elevated, per plastic sx)   Additional Comments Pt laying supine in bed upon OT arrival  Pt returned laying supine in bed w/ RLE elevated, bed alarm activated, and all needs in reach s/p OT session  Transfers   Sit to Stand 2  Maximal assistance   Additional items Assist x 1; Increased time required;Verbal cues;Armrests;Assist x 2  (Max Ax1 for STS and A of 2nd to maintain RLE elevated)   Stand to Sit 2  Maximal assistance   Additional items Assist x 1; Increased time required;Verbal cues;Armrests;Assist x 2  (Max Ax1 for STS and A of 2nd to maintain RLE elevated)   Additional Comments Pt refusing OOB transfer to chair via sit/squat pivot, adamant on attempting STS  Pt agreeable to attempt STS at EOB, stating "Let's give this a try since now I have to keep my leg up " Transfers w/ RW  VC/TC required for safety, hand placement, plastic sx precautions to maintain RLE elevated at all times during transfer  Pt w/ poor carryover of education and very frustrated w/ precautions, became irritable/agitated during transfer, requiring immediate sitting rest break  Pt required Max Ax1 STS w/ RW and SBA of 2nd to maintain RLE elevated  Pt stated "well, how the hell do you expect me to move if I need to keep my leg up?" OT provided extensive education, however Pt irritable and refusing all education  RN notified and aware  Cognition   Overall Cognitive Status Impaired   Arousal/Participation Alert; Uncooperative   Attention Difficulty attending to directions   Orientation Level Oriented X4   Memory Decreased recall of precautions  (plastic sx precautions for RLE)   Following Commands Follows one step commands with increased time or repetition   Comments Pt initially presents pleasant and cooperative to participate in therapy  As session progressed, Pt unable to recall current plastic sx precautions of "pt may transfer to recliner and commode with foot elevated  " Pt became increasingly irritable/agitated, refusing despite therapist's extensive education/encouragement  Pt stated "I haven't been doing that before today so why am I doing it now?" OT attempted further education, however Pt refusing education  OT spoke to RAVEN Kraft, who reports nursing staff may likely not be following plastic sx precautions during transfers - RN placed notification in Pt's chart for nursing staff  Per prior therapy notes, Pt able to demonstrate good carryover of precautions during transfer, ? cog deficits as Pt unable to recall previous precautions or transfers w/ therapies  Activity Tolerance   Activity Tolerance Patient limited by fatigue;Treatment limited secondary to agitation;Treatment limited secondary to medical complications (Comment)   Medical Staff Made Aware RAVEN Kraft; OTS Angelique   Assessment   Assessment Patient participated in Skilled OT session this date with interventions consisting of ADL re training with the use of correct body mechnaics, Energy Conservation techniques, safety awareness and fall prevention techniques,  maintaining plastic surgery precautions*, maintaining weight bearing restrictions,  therapeutic activities to: increase activity tolerance and increase OOB/ sitting tolerance   Patient agreeable to OT treatment session, upon arrival patient was found supine in bed  Pt agreeable to attempt transfer OOB  Pt participated in bed mobility, STS transfer at EOB w/ RW, and extensive education of current plastic sx precautions  Please refer to chart for functional levels   Patient requiring frequent re direction, verbal cues for safety, verbal cues for correct technique, verbal cues for pacing thru activity steps, cognitive assistance to anticipate next step and frequent rest periods  Patient continues to be functioning below baseline level, occupational performance remains limited secondary to factors listed above and increased risk for falls and injury  The patient's raw score on the AM-PAC Daily Activity inpatient short form is 16, standardized score is 35 96, less than 39 4  Patients at this level are likely to benefit from discharge to post-acute rehabilitation services  Please refer to the recommendation of the Occupational Therapist for safe discharge planning  From OT standpoint, recommendation at time of d/c would be Short Term Rehab  Patient to benefit from continued Occupational Therapy treatment while in the hospital to address deficits as defined above and maximize level of functional independence with ADLs and functional mobility  Plan   Treatment Interventions ADL retraining;Functional transfer training;UE strengthening/ROM; Endurance training;Cognitive reorientation;Patient/family training;Equipment evaluation/education; Compensatory technique education; Fine motor coordination activities; Activityengagement; Energy conservation   Goal Expiration Date 05/03/21   OT Treatment Day 1   OT Frequency 3-5x/wk   Recommendation   OT Discharge Recommendation Post acute rehabilitation services   OT - OK to Discharge Yes  (to rehab when medically cleared)   [facility] Daily Activity Inpatient   Lower Body Dressing 2   Bathing 2   Toileting 2   Upper Body Dressing 3   Grooming 3   Eating 4   Daily Activity Raw Score 16   Daily Activity Standardized Score (Calc for Raw Score >=11) 35 96   AM-[facility] Applied Cognition Inpatient   Following a Speech/Presentation 3   Understanding Ordinary Conversation 3   Taking Medications 3   Remembering Where Things Are Placed or Put Away 3   Remembering List of 4-5 Errands 2   Taking Care of Complicated Tasks 2   Applied Cognition Raw Score 16   Applied Cognition Standardized Score 35 03   Modified [place] Scale   Modified [place] Scale 5         Jacquelyn Aaron Hernandez, MS, OTR/L

## 2021-04-22 NOTE — PLAN OF CARE
Problem: OCCUPATIONAL THERAPY ADULT  Goal: Performs self-care activities at highest level of function for planned discharge setting  See evaluation for individualized goals  Outcome: Not Progressing  Note: Limitation: Decreased ADL status, Decreased UE strength, Decreased endurance, Decreased Safe judgement during ADL, Decreased self-care trans, Decreased high-level ADLs     Assessment: Patient participated in Skilled OT session this date with interventions consisting of ADL re training with the use of correct body mechnaics, Energy Conservation techniques, safety awareness and fall prevention techniques,  maintaining plastic surgery precautions*, maintaining weight bearing restrictions,  therapeutic activities to: increase activity tolerance and increase OOB/ sitting tolerance   Patient agreeable to OT treatment session, upon arrival patient was found supine in bed  Pt agreeable to attempt transfer OOB  Pt participated in bed mobility, STS transfer at EOB w/ RW, and extensive education of current plastic sx precautions  Please refer to chart for functional levels  Patient requiring frequent re direction, verbal cues for safety, verbal cues for correct technique, verbal cues for pacing thru activity steps, cognitive assistance to anticipate next step and frequent rest periods  Patient continues to be functioning below baseline level, occupational performance remains limited secondary to factors listed above and increased risk for falls and injury  The patient's raw score on the AM-PAC Daily Activity inpatient short form is 16, standardized score is 35 96, less than 39 4  Patients at this level are likely to benefit from discharge to post-acute rehabilitation services  Please refer to the recommendation of the Occupational Therapist for safe discharge planning  From OT standpoint, recommendation at time of d/c would be Short Term Rehab     Patient to benefit from continued Occupational Therapy treatment while in the hospital to address deficits as defined above and maximize level of functional independence with ADLs and functional mobility       OT Discharge Recommendation: Post acute rehabilitation services  OT - OK to Discharge: Yes(to rehab when medically cleared)

## 2021-04-22 NOTE — PLAN OF CARE
Problem: Potential for Falls  Goal: Patient will remain free of falls  Description: INTERVENTIONS:  - Assess patient frequently for physical needs  -  Identify cognitive and physical deficits and behaviors that affect risk of falls    -  [place] fall precautions as indicated by assessment   - Educate patient/family on patient safety including physical limitations  - Instruct patient to call for assistance with activity based on assessment  - Modify environment to reduce risk of injury  - Consider OT/PT consult to assist with strengthening/mobility  Outcome: Progressing     Problem: PAIN - ADULT  Goal: Verbalizes/displays adequate comfort level or baseline comfort level  Description: Interventions:  - Encourage patient to monitor pain and request assistance  - Assess pain using appropriate pain scale  - Administer analgesics based on type and severity of pain and evaluate response  - Implement non-pharmacological measures as appropriate and evaluate response  - Consider cultural and social influences on pain and pain management  - Notify physician/advanced practitioner if interventions unsuccessful or patient reports new pain  Outcome: Progressing     Problem: INFECTION - ADULT  Goal: Absence or prevention of progression during hospitalization  Description: INTERVENTIONS:  - Assess and monitor for signs and symptoms of infection  - Monitor lab/diagnostic results  - Monitor all insertion sites, i e  indwelling lines, tubes, and drains  - Monitor endotracheal if appropriate and nasal secretions for changes in amount and color  - [place] appropriate cooling/warming therapies per order  - Administer medications as ordered  - Instruct and encourage patient and family to use good hand hygiene technique  - Identify and instruct in appropriate isolation precautions for identified infection/condition  Outcome: Progressing     Problem: SAFETY ADULT  Goal: Patient will remain free of falls  Description: INTERVENTIONS:  - Assess patient frequently for physical needs  -  Identify cognitive and physical deficits and behaviors that affect risk of falls    -  [place] fall precautions as indicated by assessment   - Educate patient/family on patient safety including physical limitations  - Instruct patient to call for assistance with activity based on assessment  - Modify environment to reduce risk of injury  - Consider OT/PT consult to assist with strengthening/mobility  Outcome: Progressing  Goal: Maintain or return to baseline ADL function  Description: INTERVENTIONS:  -  Assess patient's ability to carry out ADLs; assess patient's baseline for ADL function and identify physical deficits which impact ability to perform ADLs (bathing, care of mouth/teeth, toileting, grooming, dressing, etc )  - Assess/evaluate cause of self-care deficits   - Assess range of motion  - Assess patient's mobility; develop plan if impaired  - Assess patient's need for assistive devices and provide as appropriate  - Encourage maximum independence but intervene and supervise when necessary  - Involve family in performance of ADLs  - Assess for home care needs following discharge   - Consider OT consult to assist with ADL evaluation and planning for discharge  - Provide patient education as appropriate  Outcome: Progressing  Goal: Maintain or return mobility status to optimal level  Description: INTERVENTIONS:  - Assess patient's baseline mobility status (ambulation, transfers, stairs, etc )    - Identify cognitive and physical deficits and behaviors that affect mobility  - Identify mobility aids required to assist with transfers and/or ambulation (gait belt, sit-to-stand, lift, walker, cane, etc )  - [place] fall precautions as indicated by assessment  - Record patient progress and toleration of activity level on Mobility SBAR; progress patient to next Phase/Stage  - Instruct patient to call for assistance with activity based on assessment  - Consider rehabilitation consult to assist with strengthening/weightbearing, etc   Outcome: Progressing     Problem: DISCHARGE PLANNING  Goal: Discharge to home or other facility with appropriate resources  Description: INTERVENTIONS:  - Identify barriers to discharge w/patient and caregiver  - Arrange for needed discharge resources and transportation as appropriate  - Identify discharge learning needs (meds, wound care, etc )  - Arrange for interpretive services to assist at discharge as needed  - Refer to Case Management Department for coordinating discharge planning if the patient needs post-hospital services based on physician/advanced practitioner order or complex needs related to functional status, cognitive ability, or social support system  Outcome: Progressing     Problem: Knowledge Deficit  Goal: Patient/family/caregiver demonstrates understanding of disease process, treatment plan, medications, and discharge instructions  Description: Complete learning assessment and assess knowledge base  Interventions:  - Provide teaching at level of understanding  - Provide teaching via preferred learning methods  Outcome: Progressing     Problem: Nutrition/Hydration-ADULT  Goal: Nutrient/Hydration intake appropriate for improving, restoring or maintaining nutritional needs  Description: Monitor and assess patient's nutrition/hydration status for malnutrition  Collaborate with interdisciplinary team and initiate plan and interventions as ordered  Monitor patient's weight and dietary intake as ordered or per policy  Utilize nutrition screening tool and intervene as necessary  Determine patient's food preferences and provide high-protein, high-caloric foods as appropriate       INTERVENTIONS:  - Monitor oral intake, urinary output, labs, and treatment plans  - Assess nutrition and hydration status and recommend course of action  - Evaluate amount of meals eaten  - Assist patient with eating if necessary   - Allow adequate time for meals  - Recommend/ encourage appropriate diets, oral nutritional supplements, and vitamin/mineral supplements  - Order, calculate, and assess calorie counts as needed  - Recommend, monitor, and adjust tube feedings and TPN/PPN based on assessed needs  - Assess need for intravenous fluids  - Provide specific nutrition/hydration education as appropriate  - Include patient/family/caregiver in decisions related to nutrition  Outcome: Progressing     Problem: Prexisting or High Potential for Compromised Skin Integrity  Goal: Skin integrity is maintained or improved  Description: INTERVENTIONS:  - Identify patients at risk for skin breakdown  - Assess and monitor skin integrity  - Assess and monitor nutrition and hydration status  - Monitor labs   - Assess for incontinence   - Turn and reposition patient  - Assist with mobility/ambulation  - Relieve pressure over bony prominences  - Avoid friction and shearing  - Provide appropriate hygiene as needed including keeping skin clean and dry  - Evaluate need for skin moisturizer/barrier cream  - Collaborate with interdisciplinary team   - Patient/family teaching  - Consider wound care consult   Outcome: Progressing     Problem: CONFUSION/THOUGHT DISTURBANCE  Goal: Thought disturbances (confusion, delirium, depression, dementia or psychosis) are managed to maintain or return to baseline mental status and functional level  Description: INTERVENTIONS:  - Assess for possible contributors to  thought disturbance, including but not limited to medications, infection, impaired vision or hearing, underlying metabolic abnormalities, dehydration, respiratory compromise,  psychiatric diagnoses and notify attending PHYSICAN/AP  - Monitor and intervene to maintain adequate nutrition, hydration, elimination, sleep and activity  - Decrease environmental stimuli, including noise as appropriate  - Provide frequent contacts to provide refocusing, direction and reassurance as needed  Approach patient calmly with eye contact and at their level    - [facility] high risk fall precautions, aspiration precautions and other safety measures, as indicated  - If delirium suspected, notify physician/AP of change in condition and request immediate in-person evaluation  - Pursue consults as appropriate including Geriatric (campus dependent), OT for cognitive evaluation/activity planning, psychiatric, pastoral care, etc   Outcome: Progressing     Problem: METABOLIC, FLUID AND ELECTROLYTES - ADULT  Goal: Electrolytes maintained within normal limits  Description: INTERVENTIONS:  - Monitor labs and assess patient for signs and symptoms of electrolyte imbalances  - Administer electrolyte replacement as ordered  - Monitor response to electrolyte replacements, including repeat lab results as appropriate  - Instruct patient on fluid and nutrition as appropriate  Outcome: Progressing  Goal: Glucose maintained within target range  Description: INTERVENTIONS:  - Monitor Blood Glucose as ordered  - Assess for signs and symptoms of hyperglycemia and hypoglycemia  - Administer ordered medications to maintain glucose within target range  - Assess nutritional intake and initiate nutrition service referral as needed  Outcome: Progressing     Problem: SKIN/TISSUE INTEGRITY - ADULT  Goal: Skin integrity remains intact  Description: INTERVENTIONS  - Identify patients at risk for skin breakdown  - Assess and monitor skin integrity  - Assess and monitor nutrition and hydration status  - Monitor labs (i e  albumin)  - Assess for incontinence   - Turn and reposition patient  - Assist with mobility/ambulation  - Relieve pressure over bony prominences  - Avoid friction and shearing  - Provide appropriate hygiene as needed including keeping skin clean and dry  - Evaluate need for skin moisturizer/barrier cream  - Collaborate with interdisciplinary team (i e  Nutrition, Rehabilitation, etc )   - Patient/family teaching  Outcome: Progressing  Goal: Incision(s), wounds(s) or drain site(s) healing without S/S of infection  Description: INTERVENTIONS  - Assess and document risk factors for skin impairment   - Assess and document dressing, incision, wound bed, drain sites and surrounding tissue  - Consider nutrition services referral as needed  - Oral mucous membranes remain intact  - Provide patient/ family education  Outcome: Progressing

## 2021-04-23 LAB
GLUCOSE SERPL-MCNC: 145 MG/DL (ref 65–140)
GLUCOSE SERPL-MCNC: 150 MG/DL (ref 65–140)
GLUCOSE SERPL-MCNC: 185 MG/DL (ref 65–140)
GLUCOSE SERPL-MCNC: 245 MG/DL (ref 65–140)

## 2021-04-23 PROCEDURE — 97530 THERAPEUTIC ACTIVITIES: CPT

## 2021-04-23 PROCEDURE — 82948 REAGENT STRIP/BLOOD GLUCOSE: CPT

## 2021-04-23 PROCEDURE — 97110 THERAPEUTIC EXERCISES: CPT

## 2021-04-23 PROCEDURE — 99024 POSTOP FOLLOW-UP VISIT: CPT | Performed by: PLASTIC SURGERY

## 2021-04-23 PROCEDURE — 99232 SBSQ HOSP IP/OBS MODERATE 35: CPT | Performed by: INTERNAL MEDICINE

## 2021-04-23 RX ORDER — CEFDINIR 300 MG/1
300 CAPSULE ORAL EVERY 12 HOURS SCHEDULED
Status: DISCONTINUED | OUTPATIENT
Start: 2021-04-23 | End: 2021-04-24

## 2021-04-23 RX ADMIN — METOPROLOL SUCCINATE 75 MG: 50 TABLET, EXTENDED RELEASE ORAL at 08:17

## 2021-04-23 RX ADMIN — DILTIAZEM HYDROCHLORIDE 180 MG: 60 TABLET, FILM COATED ORAL at 17:08

## 2021-04-23 RX ADMIN — MELATONIN TAB 3 MG 3 MG: 3 TAB at 21:39

## 2021-04-23 RX ADMIN — GABAPENTIN 100 MG: 100 CAPSULE ORAL at 08:17

## 2021-04-23 RX ADMIN — ASPIRIN 325 MG ORAL TABLET 325 MG: 325 PILL ORAL at 08:14

## 2021-04-23 RX ADMIN — ATORVASTATIN CALCIUM 40 MG: 40 TABLET, FILM COATED ORAL at 17:08

## 2021-04-23 RX ADMIN — INSULIN LISPRO 1 UNITS: 100 INJECTION, SOLUTION INTRAVENOUS; SUBCUTANEOUS at 21:39

## 2021-04-23 RX ADMIN — METOPROLOL SUCCINATE 75 MG: 50 TABLET, EXTENDED RELEASE ORAL at 21:39

## 2021-04-23 RX ADMIN — APIXABAN 5 MG: 5 TABLET, FILM COATED ORAL at 08:17

## 2021-04-23 RX ADMIN — QUETIAPINE FUMARATE 25 MG: 25 TABLET ORAL at 21:39

## 2021-04-23 RX ADMIN — DILTIAZEM HYDROCHLORIDE 180 MG: 60 TABLET, FILM COATED ORAL at 08:16

## 2021-04-23 RX ADMIN — QUETIAPINE FUMARATE 12.5 MG: 25 TABLET ORAL at 08:17

## 2021-04-23 RX ADMIN — INSULIN LISPRO 3 UNITS: 100 INJECTION, SOLUTION INTRAVENOUS; SUBCUTANEOUS at 11:34

## 2021-04-23 RX ADMIN — INSULIN GLARGINE 15 UNITS: 100 INJECTION, SOLUTION SUBCUTANEOUS at 08:13

## 2021-04-23 RX ADMIN — CEFDINIR 300 MG: 300 CAPSULE ORAL at 15:43

## 2021-04-23 RX ADMIN — DOCUSATE SODIUM 100 MG: 100 CAPSULE, LIQUID FILLED ORAL at 08:16

## 2021-04-23 RX ADMIN — CEPHALEXIN 500 MG: 500 CAPSULE ORAL at 05:49

## 2021-04-23 RX ADMIN — DOCUSATE SODIUM 100 MG: 100 CAPSULE, LIQUID FILLED ORAL at 17:08

## 2021-04-23 RX ADMIN — CEPHALEXIN 500 MG: 500 CAPSULE ORAL at 01:10

## 2021-04-23 RX ADMIN — LISINOPRIL 40 MG: 20 TABLET ORAL at 08:16

## 2021-04-23 RX ADMIN — CEFDINIR 300 MG: 300 CAPSULE ORAL at 21:40

## 2021-04-23 RX ADMIN — APIXABAN 5 MG: 5 TABLET, FILM COATED ORAL at 17:08

## 2021-04-23 RX ADMIN — INSULIN LISPRO 1 UNITS: 100 INJECTION, SOLUTION INTRAVENOUS; SUBCUTANEOUS at 17:09

## 2021-04-23 NOTE — CASE MANAGEMENT
Spoke to Dr Rolf Roper surgery  Patient is cleared for dc to snf when bed is available  Message was sent to [facility] to confirm that they can accept the patient when insurance auth received  CM   Will initiate request for insurance auth

## 2021-04-23 NOTE — PROGRESS NOTES
Plastic Surgery Attending     Pt seen and examined  No major events overnight, tolerating dangle protocol     /81   Pulse 85   Temp (!) 97 4 °F (36 3 °C)   Resp 16   Ht 6' 5" (1 956 m)   Wt 99 1 kg (218 lb 7 6 oz)   SpO2 99%   BMI 25 91 kg/m²     Assessment:  Principal Problem:    S/P transmetatarsal amputation of foot, right (HCC)  Active Problems:    Atrial fibrillation with RVR (HCC)    Hyperlipidemia    Type 2 diabetes mellitus with diabetic neuropathy (HCC)    Essential hypertension    Acquired absence of other right toe(s) (HCC)    Deep disruption or dehiscence of operation wound    Ambulatory dysfunction    Diabetic ulcer of right 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, with fat layer exposed ([address] )    Sleep disturbance     73 y o  male POD 10 from ALT reconstruction of R foot defect, subsequent takebacks on POD 0 and 1 for venous congestion       Doing well      Plan:  - Continue Q4 hr flap, hamilton hugger  Ok to move to commode with leg elevated at all times  - Cont Eliquis, daily aspirin  - Cont regular diet  - Appreciate assistance of Gerontology, Cards, ID and IM  - Doxy BID through 5/24  Erythema much improved  - Daily xeroform replacement to foot  - Continue dangle per protocol:  Post Operative Dangle Protocol:     Please "dangle" the patient's RLE for the dates and times listed below  OK to have the heel rest on a pillow/chux pad   Immediately elevate leg for any darker/purple discoloration of flap     4/22: 5 min 2x a day  4/23: 5 min 3x a day  4/24: 10 min 2x a day  4/25: 10 min 3x a day  4/26, 4/27: 15 min 2x a day  4/28, 4/29: 15 min 3x a day  4/30, 5/1: 20 min 2x a day  5/2, 5/3: 20 min 3x a day  5/4: 30 min once  5/5, 5/6: 30 min 2x a day  5/7, 5/8: 30 min 3x day  5/9: 45 min once  5/10, 5/11: 45 min 2x a day  5/12 and until cleared by Dr Carmen Fierro for longer: 45 min 3x a day     Again, immediately elevate leg for any darker/purple discoloration of flap      - PT and OT: pt may transfer to recliner and commode with foot elevated  Can work upper extermity strength  Will likely need a trapeze on bed, etc     - Dispo:Patient is clear for discharge from plastic surgery standpoint  Awaiting insurance authorization for facility       [facility] [address] [facility]   [address]   [address]   [facility] [place] [address]   Office: [phone]

## 2021-04-23 NOTE — PLAN OF CARE
Problem: PHYSICAL THERAPY ADULT  Goal: Performs mobility at highest level of function for planned discharge setting  See evaluation for individualized goals  Description: Treatment/Interventions: LE strengthening/ROM, Therapeutic exercise, Endurance training, Patient/family training, Equipment eval/education, Bed mobility, Continued evaluation, Spoke to nursing          See flowsheet documentation for full assessment, interventions and recommendations  Outcome: Progressing  Note: Prognosis: Fair  Problem List: Decreased strength, Decreased range of motion, Decreased endurance, Impaired balance, Decreased mobility, Decreased coordination, Impaired judgement, Impaired sensation, Decreased skin integrity, Orthopedic restrictions, Pain  Assessment: PT COORDINATED TX SESSION W/ RN FOR SAFETY AND DANGLE PROTOCOL - PT TOELRATED APPROX 2 MINS 30SECS (SEE RN NOTES FOR FULL DETAILS- DID SIT PIVOT XFER TO L W/ 100% COMPLIANCE TO NWB ON R LE THROUGHOUT- R LE ELEVATED ABOVE HEART IN RECLINER POST SESSION  PT TOELRATED TE WELL AND REPORTS HAS BEEN COMPLETING IN BED AS INSTRUCTED  PT UP IN [facility] W  ALARM INTACT ADN ALL NEEDS IN REACH  IN CARE OF RN  REHAB ON D/C RECOMMENDED AT THIS TIME  PT Discharge Recommendation: Post acute rehabilitation services     PT - OK to Discharge: Yes    See flowsheet documentation for full assessment

## 2021-04-23 NOTE — PHYSICAL THERAPY NOTE
Physical Therapy Screen    Patient Name: Carlos Gonzalez      Today's Date: 4/23/2021     Problem List  Principal Problem:    S/P transmetatarsal amputation of foot, right (HCC)  Active Problems:    Hyperlipidemia    Type 2 diabetes mellitus with diabetic neuropathy ([facility])    Atrial fibrillation with RVR ([address] )    Essential hypertension    Acquired absence of other right toe(s) ([address] )    Deep disruption or dehiscence of operation wound    Ambulatory dysfunction    Diabetic ulcer of right 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, with fat layer exposed ([address] )    Sleep disturbance       Past Medical History  Past Medical History:   Diagnosis Date    Arthritis     Atrial fibrillation ([address] )     Diagnosed 11/2018    Benign prostate hyperplasia 04/2002    Cellulitis     right lower leg     Colon polyp 2006    Diabetes mellitus ([address] )     Hearing aid worn     bilat    Hearing loss     90% loss left ear and 40% right ear    History of clubfoot     "since birth"    History of pneumonia     History of TIA (transient ischemic attack) 04/25/2017 11/30/18 pt denies    Hyperlipidemia 5/15/2014    Hypertension     Infectious viral hepatitis     "cant remember type"    Irregular heart beat     Neuropathy     both feet    Osteomyelitis ([address] )     right great toe    Right middle lobe pulmonary nodule 11/6/2018    Seasickness     Teeth missing     Type 2 diabetes mellitus with diabetic neuropathy ([address] ) 11/6/2018    Wears glasses     reading    Wound, open     bottom of right foot        Past Surgical History  Past Surgical History:   Procedure Laterality Date    BUNIONECTOMY Right 12/4/2018    Procedure: 5TH METATARSAL BONE PARTIAL RESECTION, FULL THICKNESS DEBRIDEMENT OF DIABETIC ULCER;  Surgeon: Victorino Peters DPM;  [facility];  Service: Podiatry    CLUB FOOT RELEASE Bilateral     COLONOSCOPY      EXTERNAL FIXATOR APPLICATION Right 1/84/9006 Procedure: Application multiplane external fixation;  Surgeon: Polly Swan DPM;  [facility];  Service: Podiatry    FOOT HARDWARE REMOVAL Right 2/17/2021    Procedure: REMOVAL EXTERNAL FIXATOR;  Surgeon: Polly Swan DPM;  [facility];  Service: Podiatry    INCISION AND DRAINAGE OF WOUND Right 2/17/2021    Procedure: INCISION AND DRAINAGE (I&D) EXTREMITY;  Surgeon: Polly Swan DPM;  [facility];  Service: Podiatry    ORIF FOOT FRACTURE Right 3/4/2021    Procedure: VAC PLACEMENT; SCREW FIXATION RIGHT FOOT FUSION;  Surgeon: Polly Swan DPM;  [facility];  Service: Podiatry    [address] [employer] Right 2/22/2021    Procedure: AMPUTATION TRANSMETATARSAL (TMA), REMOVAL NAIL IM T2 ICF HARDWARE;  Surgeon: Polly Swan DPM;  [facility];  Service: Podiatry    MT FUSION FOOT BONES,MIDTARSAL,OSTEOTMY Right 2/12/2021    Procedure: foot ARTHRODESIS/FUSION;  Surgeon: Polly Swan DPM;  [facility];  Service: Podiatry    MT LENGTH/SHORT LEG/ANKL TENDON,SINGLE Right 2/12/2021    Procedure: LENGTHEN TIBIAL TENDON, TRANS HEEL CORD;  Surgeon: Polly Swan DPM;  [facility];  Service: Podiatry    MT MUSCLE-SKIN FLAP,TRUNK Right 4/12/2021    Procedure: RECONSTRUCTION MICROSURGICAL W/ FREE FLAP;  Surgeon: Milan Singh MD;  [facility];  Service: Plastics    MT MUSCLE-SKIN FLAP,TRUNK Right 4/12/2021    Procedure: TAKEBACK RECONSTRUCTION MICROSURGICAL W/ FREE FLAP;  Surgeon: Milan Singh MD;  [facility];  Service: Plastics    MT MUSCLE-SKIN FLAP,TRUNK Right 4/13/2021    Procedure: RECONSTRUCTION MICROSURGICAL W/ FREE FLAP, RE EXPLORATION, MICRO VASCULAR REVISION;  Surgeon: Milan Singh MD;  [facility];  Service: Plastics    MT PART [facility] [facility] BONE METATARSAL HEAD,EA Right 12/29/2020    Procedure: EXCISION EXOSTOSIS;  Surgeon: Polly Swan DPM;  [facility];  Service: Podiatry    AK SECD CLOS SURG WND EXTEN/COMPLIC Right 44/43/6308    Procedure: PRIMARY DELAYED CLOSURE;  Surgeon: Enrique Baer DPM;  [facility];  Service: Podiatry    TOE AMPUTATION Right     partial great toe    TONSILLECTOMY      VAC DRESSING APPLICATION Right 4/1/5381    Procedure: APPLICATION VAC DRESSING EXTREMITY;  Surgeon: Krystle Wagner MD;  [facility];  Service: Plastics    [address] Right 3/1/2021    Procedure: DEBRIDEMENT LOWER EXTREMITY ([address] OUT); Surgeon: Krystle Wagner MD;  [facility];  Service: Plastics    WOUND DEBRIDEMENT Right 4/7/2021    Procedure: DEBRIDEMENT RIGHT FOOT;  Surgeon: Krystle Wagner MD;  [facility];  Service: Plastics    WOUND DEBRIDEMENT Right 4/12/2021    Procedure: DEBRIDEMENT LOWER EXTREMITY [facility] OUT); Surgeon: Krystle Wagner MD;  [facility];  Service: Plastics        04/23/21 1155   PT Last Visit   PT Visit Date 04/23/21   Note Type   Note Type Treatment   Pain Assessment   Pain Assessment Tool 0-10   Pain Score No Pain   Restrictions/Precautions   RLE Weight Bearing Per Order NWB   LLE Weight Bearing Per Order WBAT   Other Precautions Impulsive; Chair Alarm; Bed Alarm;Limb alert; Fall Risk;Pain   General   Chart Reviewed Yes   Additional Pertinent History CLEARED FOR DANGEL PROTOCOL BY PLASTICS- XFERS OOB AND TO COMMODE W/ R LE ELEVATED- PT COORDINATED XFER TRAINING W/ DANGLE PROTOCOL W/ RW PRESENT (GHASSAN) PT WAS EXTENSIVELY EDUCATED ON WEIGHTBEARING RESTRICTIONS; ELEVATION OF RLE ALL TIMES EXCEPT WHEN W/ NURSING/ PT FOR DANGLE PROTOCOL COORDINATION - PT IN AGREEMENT    Cognition   Overall Cognitive Status Impaired   Arousal/Participation Alert; Cooperative   Orientation Level Oriented X4   Following Commands Follows all commands and directions without difficulty   Comments PT PLEASANT AND COOPERATIVE THROUGHOUT- A+O X4 AND AWARE OF WEIGHTBEARING RESTRICTIONS AND PRECAUTIONS W/O CUES FROM RN OR PT- WAS JOKING W/ PT AND RN THROUGHOUT SESSION    Bed Mobility   Rolling R 5  Supervision   Rolling L 5  Supervision   Supine to Sit 4  Minimal assistance  (FOR R LE MANAGEMENT ONLY )   Sit to Supine 4  Minimal assistance   Additional Comments RN ([place]ASSAN PRESENT- PT ABLE TO DANGLE 2MIN30 SECS) SEE RN NOTES FOR DETAILS    Transfers   Sit pivot 4  Minimal assistance   Additional items Assist x 1;Verbal cues   Additional Comments SPV TO L RECLINER FOR DANGLE PROTOCOL- RN AND RA ADDIS PRESENT  PT UP IN RECLIENR W/ 2 PILLOWS ELEVATION TO R LE    Ambulation/Elevation   Gait pattern Not appropriate   Balance   Static Sitting Good   Dynamic Sitting Fair +   Endurance Deficit   Endurance Deficit Yes   Activity Tolerance   Activity Tolerance Patient limited by fatigue   Medical Staff Made Aware RN GHASSAN    Exercises   Quad Sets Sitting;20 reps; Left   Glute Sets Sitting;Bilateral;15 reps   Hip Abduction Sitting;Right;Left  (R ELEVATED AND OFFLOADED IN RECLINER )   Hip Adduction Sitting;10 reps;Right;Left   Ankle Pumps 25 reps; Left   Assessment   Prognosis Fair   Problem List Decreased strength;Decreased range of motion;Decreased endurance; Impaired balance;Decreased mobility; Decreased coordination; Impaired judgement; Impaired sensation;Decreased skin integrity;Orthopedic restrictions;Pain   Assessment PT COORDINATED TX SESSION W/ RN FOR SAFETY AND DANGLE PROTOCOL - PT TOELRATED APPROX 2 MINS 30SECS (SEE RN NOTES FOR FULL DETAILS- DID SIT PIVOT XFER TO L W/ 100% COMPLIANCE TO NWB ON R LE THROUGHOUT- R LE ELEVATED ABOVE HEART IN RECLINER POST SESSION  PT TOELRATED TE WELL AND REPORTS HAS BEEN COMPLETING IN BED AS INSTRUCTED  PT UP IN [facility] W  ALARM INTACT ADN ALL NEEDS IN REACH  IN CARE OF RN  REHAB ON D/C RECOMMENDED AT THIS TIME      Goals   Patient Goals TO HAVE HIS FOOT HEAL    STG Expiration Date 05/01/21   PT Treatment Day 1   Plan   PT Frequency   (3-5X/WK ) Recommendation   PT Discharge Recommendation Post acute rehabilitation services   PT - OK to Discharge Yes   Additional Comments TO [address] Basic Mobility Inpatient   Turning in Bed Without Bedrails 3   Lying on Back to Sitting on Edge of Flat Bed 3   Moving Bed to Chair 3   Standing Up From Chair 3   Walk in Room 1   Climb 3-5 Stairs 1   Basic Mobility Inpatient Raw Score 14   Basic Mobility Standardized Score 35 55

## 2021-04-23 NOTE — PLAN OF CARE
Problem: Potential for Falls  Goal: Patient will remain free of falls  Description: INTERVENTIONS:  - Assess patient frequently for physical needs  -  Identify cognitive and physical deficits and behaviors that affect risk of falls    -  [place] fall precautions as indicated by assessment   - Educate patient/family on patient safety including physical limitations  - Instruct patient to call for assistance with activity based on assessment  - Modify environment to reduce risk of injury  - Consider OT/PT consult to assist with strengthening/mobility  Outcome: Progressing     Problem: PAIN - ADULT  Goal: Verbalizes/displays adequate comfort level or baseline comfort level  Description: Interventions:  - Encourage patient to monitor pain and request assistance  - Assess pain using appropriate pain scale  - Administer analgesics based on type and severity of pain and evaluate response  - Implement non-pharmacological measures as appropriate and evaluate response  - Consider cultural and social influences on pain and pain management  - Notify physician/advanced practitioner if interventions unsuccessful or patient reports new pain  Outcome: Progressing     Problem: INFECTION - ADULT  Goal: Absence or prevention of progression during hospitalization  Description: INTERVENTIONS:  - Assess and monitor for signs and symptoms of infection  - Monitor lab/diagnostic results  - Monitor all insertion sites, i e  indwelling lines, tubes, and drains  - Monitor endotracheal if appropriate and nasal secretions for changes in amount and color  - [place] appropriate cooling/warming therapies per order  - Administer medications as ordered  - Instruct and encourage patient and family to use good hand hygiene technique  - Identify and instruct in appropriate isolation precautions for identified infection/condition  Outcome: Progressing     Problem: SAFETY ADULT  Goal: Patient will remain free of falls  Description: INTERVENTIONS:  - Assess patient frequently for physical needs  -  Identify cognitive and physical deficits and behaviors that affect risk of falls    -  [place] fall precautions as indicated by assessment   - Educate patient/family on patient safety including physical limitations  - Instruct patient to call for assistance with activity based on assessment  - Modify environment to reduce risk of injury  - Consider OT/PT consult to assist with strengthening/mobility  Outcome: Progressing  Goal: Maintain or return to baseline ADL function  Description: INTERVENTIONS:  -  Assess patient's ability to carry out ADLs; assess patient's baseline for ADL function and identify physical deficits which impact ability to perform ADLs (bathing, care of mouth/teeth, toileting, grooming, dressing, etc )  - Assess/evaluate cause of self-care deficits   - Assess range of motion  - Assess patient's mobility; develop plan if impaired  - Assess patient's need for assistive devices and provide as appropriate  - Encourage maximum independence but intervene and supervise when necessary  - Involve family in performance of ADLs  - Assess for home care needs following discharge   - Consider OT consult to assist with ADL evaluation and planning for discharge  - Provide patient education as appropriate  Outcome: Progressing  Goal: Maintain or return mobility status to optimal level  Description: INTERVENTIONS:  - Assess patient's baseline mobility status (ambulation, transfers, stairs, etc )    - Identify cognitive and physical deficits and behaviors that affect mobility  - Identify mobility aids required to assist with transfers and/or ambulation (gait belt, sit-to-stand, lift, walker, cane, etc )  - [place] fall precautions as indicated by assessment  - Record patient progress and toleration of activity level on Mobility SBAR; progress patient to next Phase/Stage  - Instruct patient to call for assistance with activity based on assessment  - Consider rehabilitation consult to assist with strengthening/weightbearing, etc   Outcome: Progressing     Problem: DISCHARGE PLANNING  Goal: Discharge to home or other facility with appropriate resources  Description: INTERVENTIONS:  - Identify barriers to discharge w/patient and caregiver  - Arrange for needed discharge resources and transportation as appropriate  - Identify discharge learning needs (meds, wound care, etc )  - Arrange for interpretive services to assist at discharge as needed  - Refer to Case Management Department for coordinating discharge planning if the patient needs post-hospital services based on physician/advanced practitioner order or complex needs related to functional status, cognitive ability, or social support system  Outcome: Progressing     Problem: Knowledge Deficit  Goal: Patient/family/caregiver demonstrates understanding of disease process, treatment plan, medications, and discharge instructions  Description: Complete learning assessment and assess knowledge base  Interventions:  - Provide teaching at level of understanding  - Provide teaching via preferred learning methods  Outcome: Progressing     Problem: Nutrition/Hydration-ADULT  Goal: Nutrient/Hydration intake appropriate for improving, restoring or maintaining nutritional needs  Description: Monitor and assess patient's nutrition/hydration status for malnutrition  Collaborate with interdisciplinary team and initiate plan and interventions as ordered  Monitor patient's weight and dietary intake as ordered or per policy  Utilize nutrition screening tool and intervene as necessary  Determine patient's food preferences and provide high-protein, high-caloric foods as appropriate       INTERVENTIONS:  - Monitor oral intake, urinary output, labs, and treatment plans  - Assess nutrition and hydration status and recommend course of action  - Evaluate amount of meals eaten  - Assist patient with eating if necessary   - Allow adequate time for meals  - Recommend/ encourage appropriate diets, oral nutritional supplements, and vitamin/mineral supplements  - Order, calculate, and assess calorie counts as needed  - Recommend, monitor, and adjust tube feedings and TPN/PPN based on assessed needs  - Assess need for intravenous fluids  - Provide specific nutrition/hydration education as appropriate  - Include patient/family/caregiver in decisions related to nutrition  Outcome: Progressing     Problem: Prexisting or High Potential for Compromised Skin Integrity  Goal: Skin integrity is maintained or improved  Description: INTERVENTIONS:  - Identify patients at risk for skin breakdown  - Assess and monitor skin integrity  - Assess and monitor nutrition and hydration status  - Monitor labs   - Assess for incontinence   - Turn and reposition patient  - Assist with mobility/ambulation  - Relieve pressure over bony prominences  - Avoid friction and shearing  - Provide appropriate hygiene as needed including keeping skin clean and dry  - Evaluate need for skin moisturizer/barrier cream  - Collaborate with interdisciplinary team   - Patient/family teaching  - Consider wound care consult   Outcome: Progressing     Problem: CONFUSION/THOUGHT DISTURBANCE  Goal: Thought disturbances (confusion, delirium, depression, dementia or psychosis) are managed to maintain or return to baseline mental status and functional level  Description: INTERVENTIONS:  - Assess for possible contributors to  thought disturbance, including but not limited to medications, infection, impaired vision or hearing, underlying metabolic abnormalities, dehydration, respiratory compromise,  psychiatric diagnoses and notify attending PHYSICAN/AP  - Monitor and intervene to maintain adequate nutrition, hydration, elimination, sleep and activity  - Decrease environmental stimuli, including noise as appropriate  - Provide frequent contacts to provide refocusing, direction and reassurance as needed  Approach patient calmly with eye contact and at their level    - [place] high risk fall precautions, aspiration precautions and other safety measures, as indicated  - If delirium suspected, notify physician/AP of change in condition and request immediate in-person evaluation  - Pursue consults as appropriate including Geriatric (campus dependent), OT for cognitive evaluation/activity planning, psychiatric, pastoral care, etc   Outcome: Progressing     Problem: METABOLIC, FLUID AND ELECTROLYTES - ADULT  Goal: Electrolytes maintained within normal limits  Description: INTERVENTIONS:  - Monitor labs and assess patient for signs and symptoms of electrolyte imbalances  - Administer electrolyte replacement as ordered  - Monitor response to electrolyte replacements, including repeat lab results as appropriate  - Instruct patient on fluid and nutrition as appropriate  Outcome: Progressing  Goal: Glucose maintained within target range  Description: INTERVENTIONS:  - Monitor Blood Glucose as ordered  - Assess for signs and symptoms of hyperglycemia and hypoglycemia  - Administer ordered medications to maintain glucose within target range  - Assess nutritional intake and initiate nutrition service referral as needed  Outcome: Progressing     Problem: SKIN/TISSUE INTEGRITY - ADULT  Goal: Skin integrity remains intact  Description: INTERVENTIONS  - Identify patients at risk for skin breakdown  - Assess and monitor skin integrity  - Assess and monitor nutrition and hydration status  - Monitor labs (i e  albumin)  - Assess for incontinence   - Turn and reposition patient  - Assist with mobility/ambulation  - Relieve pressure over bony prominences  - Avoid friction and shearing  - Provide appropriate hygiene as needed including keeping skin clean and dry  - Evaluate need for skin moisturizer/barrier cream  - Collaborate with interdisciplinary team (i e  Nutrition, Rehabilitation, etc )   - Patient/family teaching  Outcome: Progressing  Goal: Incision(s), wounds(s) or drain site(s) healing without S/S of infection  Description: INTERVENTIONS  - Assess and document risk factors for skin impairment   - Assess and document dressing, incision, wound bed, drain sites and surrounding tissue  - Consider nutrition services referral as needed  - Oral mucous membranes remain intact  - Provide patient/ family education  Outcome: Progressing

## 2021-04-23 NOTE — PROGRESS NOTES
Progress Note - Infectious Disease   Rickey Laird  73 y o  male MRN: [id number]  Unit/Bed#: [facility] 525-01 Encounter: [id number]      Impression/Recommendations:  1  Possible residual osteomyelitis of right foot   Patient is status post screw fixation right foot on 03/04, with operative bone scraping culture growing Enterobacter   Patient has been on p o  Doxycycline since and is tolerating it well  Asiya Males, he had a persistent open foot wound   Patient is now status post flap closure of right foot   With successful flap closure, there is no further bone exposed   The best course of action at this point is to have patient continue antibiotic for 6 weeks after flap closure  Continue p o  Doxycycline  Treat x6 weeks after flap closure, through 5/24      2  Open right foot wound   Patient is status post flap closure, requiring return to OR twice for revision due to congestion   Wound is clean and healing well, without evidence of cellulitis  However, there was purulent drainage from incision earlier this week, but not at present  Culture of purulence was done with growth of GNR thus far  It is most likely Enterobacter again  Will await susceptibility  Change Keflex to cefdinir  Follow-up on ID and susceptibilities of GNR in wound culture  Wound care per Plastic surgery      3  Status post screw fixation right foot on 03/04      4  Status post failed right foot reconstruction, requiring TMA   All hardware prior to TMA were removed   However, as in above, patient is status post screw fixation of right foot after TMA      5  DM, somewhat poorly controlled, with elevated hemoglobin A1c  Management per Medicine Service      Discussed with patient in detail regarding the above plan        Antibiotics:  Doxycycline  POD # 11  Keflex # 2      Subjective:  Patient is comfortable  Confusion mild and improving  No pain in right foot  Temperature stays down   No chills    He is tolerating antibiotic well   No nausea, vomiting or diarrhea      Objective:  Vitals:  Temp:  [97 7 °F (36 5 °C)-98 6 °F (37 °C)] 98 °F (36 7 °C)  HR:  [58-85] 85  Resp:  [18-19] 18  BP: ()/(62-83) 114/83  SpO2:  [99 %] 99 %  Temp (24hrs), Av 1 °F (36 7 °C), Min:97 7 °F (36 5 °C), Max:98 6 °F (37 °C)  Current: Temperature: 98 °F (36 7 °C)    Physical Exam:     General: Awake, alert, cooperative, no distress  Neck:  Supple  No mass  No lymphadenopathy  Lungs: Expansion symmetric, no rales, no wheezing, respirations unlabored  Heart:  Regular rate and rhythm, S1 and S2 normal, no murmur  Abdomen: Soft, nondistended, non-tender, bowel sounds active all four quadrants, no masses, no organomegaly  Extremities: Stable foot edema  Incision with small area of dehiscence  No drainage at present  No erythema/warmth  Nontender  Skin:  No rash  Neuro: Moves all extremities  Invasive Devices     Peripheral Intravenous Line            Peripheral IV 21 Left Forearm 3 days          Drain            External Urinary Catheter Small 7 days                Labs studies:   I have personally reviewed pertinent labs  Results from last 7 days   Lab Units 21  0442   POTASSIUM mmol/L 4 6   CHLORIDE mmol/L 113*   CO2 mmol/L 16*   BUN mg/dL 13   CREATININE mg/dL 0 79   EGFR ml/min/1 73sq m 92   CALCIUM mg/dL 8 6         Results from last 7 days   Lab Units 21  0905   WOUND CULTURE  2+ Growth of Gram Negative Leonard*       Imaging Studies:   I have personally reviewed pertinent imaging study reports and images in PACS  EKG, Pathology, and Other Studies:   I have personally reviewed pertinent reports

## 2021-04-24 LAB
BACTERIA WND AEROBE CULT: ABNORMAL
BACTERIA WND AEROBE CULT: ABNORMAL
GLUCOSE SERPL-MCNC: 153 MG/DL (ref 65–140)
GLUCOSE SERPL-MCNC: 154 MG/DL (ref 65–140)
GLUCOSE SERPL-MCNC: 163 MG/DL (ref 65–140)
GLUCOSE SERPL-MCNC: 243 MG/DL (ref 65–140)
GRAM STN SPEC: ABNORMAL
GRAM STN SPEC: ABNORMAL

## 2021-04-24 PROCEDURE — 99024 POSTOP FOLLOW-UP VISIT: CPT | Performed by: STUDENT IN AN ORGANIZED HEALTH CARE EDUCATION/TRAINING PROGRAM

## 2021-04-24 PROCEDURE — 99232 SBSQ HOSP IP/OBS MODERATE 35: CPT | Performed by: INTERNAL MEDICINE

## 2021-04-24 PROCEDURE — 82948 REAGENT STRIP/BLOOD GLUCOSE: CPT

## 2021-04-24 RX ORDER — POLYETHYLENE GLYCOL 3350 17 G/17G
17 POWDER, FOR SOLUTION ORAL DAILY
Status: DISCONTINUED | OUTPATIENT
Start: 2021-04-24 | End: 2021-04-30 | Stop reason: HOSPADM

## 2021-04-24 RX ORDER — SULFAMETHOXAZOLE AND TRIMETHOPRIM 800; 160 MG/1; MG/1
1 TABLET ORAL EVERY 12 HOURS SCHEDULED
Status: DISCONTINUED | OUTPATIENT
Start: 2021-04-24 | End: 2021-04-26

## 2021-04-24 RX ADMIN — ATORVASTATIN CALCIUM 40 MG: 40 TABLET, FILM COATED ORAL at 17:13

## 2021-04-24 RX ADMIN — SULFAMETHOXAZOLE AND TRIMETHOPRIM 1 TABLET: 800; 160 TABLET ORAL at 15:51

## 2021-04-24 RX ADMIN — POLYETHYLENE GLYCOL 3350 17 G: 17 POWDER, FOR SOLUTION ORAL at 12:33

## 2021-04-24 RX ADMIN — DOCUSATE SODIUM 100 MG: 100 CAPSULE, LIQUID FILLED ORAL at 08:02

## 2021-04-24 RX ADMIN — LISINOPRIL 40 MG: 20 TABLET ORAL at 08:02

## 2021-04-24 RX ADMIN — INSULIN GLARGINE 15 UNITS: 100 INJECTION, SOLUTION SUBCUTANEOUS at 09:51

## 2021-04-24 RX ADMIN — MELATONIN TAB 3 MG 3 MG: 3 TAB at 21:12

## 2021-04-24 RX ADMIN — METOPROLOL SUCCINATE 75 MG: 50 TABLET, EXTENDED RELEASE ORAL at 21:17

## 2021-04-24 RX ADMIN — INSULIN LISPRO 1 UNITS: 100 INJECTION, SOLUTION INTRAVENOUS; SUBCUTANEOUS at 08:06

## 2021-04-24 RX ADMIN — APIXABAN 5 MG: 5 TABLET, FILM COATED ORAL at 08:02

## 2021-04-24 RX ADMIN — DILTIAZEM HYDROCHLORIDE 180 MG: 60 TABLET, FILM COATED ORAL at 08:02

## 2021-04-24 RX ADMIN — ASPIRIN 325 MG ORAL TABLET 325 MG: 325 PILL ORAL at 08:02

## 2021-04-24 RX ADMIN — INSULIN LISPRO 3 UNITS: 100 INJECTION, SOLUTION INTRAVENOUS; SUBCUTANEOUS at 12:26

## 2021-04-24 RX ADMIN — DILTIAZEM HYDROCHLORIDE 180 MG: 60 TABLET, FILM COATED ORAL at 17:13

## 2021-04-24 RX ADMIN — DOCUSATE SODIUM AND SENNOSIDES 1 TABLET: 8.6; 5 TABLET ORAL at 21:14

## 2021-04-24 RX ADMIN — METOPROLOL SUCCINATE 75 MG: 50 TABLET, EXTENDED RELEASE ORAL at 08:02

## 2021-04-24 RX ADMIN — CEFDINIR 300 MG: 300 CAPSULE ORAL at 08:04

## 2021-04-24 RX ADMIN — INSULIN LISPRO 1 UNITS: 100 INJECTION, SOLUTION INTRAVENOUS; SUBCUTANEOUS at 21:38

## 2021-04-24 RX ADMIN — APIXABAN 5 MG: 5 TABLET, FILM COATED ORAL at 17:14

## 2021-04-24 RX ADMIN — SULFAMETHOXAZOLE AND TRIMETHOPRIM 1 TABLET: 800; 160 TABLET ORAL at 21:12

## 2021-04-24 RX ADMIN — GABAPENTIN 100 MG: 100 CAPSULE ORAL at 08:02

## 2021-04-24 RX ADMIN — INSULIN LISPRO 1 UNITS: 100 INJECTION, SOLUTION INTRAVENOUS; SUBCUTANEOUS at 17:14

## 2021-04-24 RX ADMIN — DOCUSATE SODIUM 100 MG: 100 CAPSULE, LIQUID FILLED ORAL at 17:13

## 2021-04-24 RX ADMIN — QUETIAPINE FUMARATE 12.5 MG: 25 TABLET ORAL at 08:03

## 2021-04-24 RX ADMIN — QUETIAPINE FUMARATE 25 MG: 25 TABLET ORAL at 21:13

## 2021-04-24 NOTE — PROGRESS NOTES
Plastic Surgery Note     Pt seen and examined  No major events overnight, tolerating dangle protocol      Assessment: 73 y  o  male POD 11 from ALT reconstruction of R foot defect, subsequent takebacks on POD 0 and 1 for venous congestion       Doing well, no issues overnight, awaiting placement  Flap viable     Plan:  - Continue Q4 hr flap, hamilton gomez  Ok to move to commode with leg elevated at all times  - Cont Eliquis, daily aspirin  - Cont regular diet  - Appreciate assistance of Gerontology, Cards, ID and IM  - Doxy BID through 5/24  Erythema much improved  - Daily xeroform replacement to foot  - Continue dangle per protocol:  Post Operative Dangle Protocol:     Please "dangle" the patient's RLE for the dates and times listed below  OK to have the heel rest on a pillow/chux pad  Immediately elevate leg for any darker/purple discoloration of flap     4/22: 5 min 2x a day  4/23: 5 min 3x a day  4/24: 10 min 2x a day  4/25: 10 min 3x a day  4/26, 4/27: 15 min 2x a day  4/28, 4/29: 15 min 3x a day  4/30, 5/1: 20 min 2x a day  5/2, 5/3: 20 min 3x a day  5/4: 30 min once  5/5, 5/6: 30 min 2x a day  5/7, 5/8: 30 min 3x day  5/9: 45 min once  5/10, 5/11: 45 min 2x a day  5/12 and until cleared by Dr Stalin Woodall for longer: 45 min 3x a day     Again, immediately elevate leg for any darker/purple discoloration of flap      - PT and OT: pt may transfer to recliner and commode with foot elevated  Can work upper extermity strength  Will likely need a trapeze on bed, etc      - Dispo:Patient is clear for discharge from plastic surgery standpoint  Awaiting insurance authorization for facility       MD Stephany Tam [facility]   [address]   [address]   Office: [phone]

## 2021-04-24 NOTE — PROGRESS NOTES
Progress Note - Infectious Disease   Krista England  73 y o  male MRN: [id number]  Unit/Bed#: [facility] 525-01 Encounter: [id number]         Impression/Recommendations:  1  Possible residual osteomyelitis of right foot   Patient is status post screw fixation right foot on 03/04, with operative bone scraping culture growing Enterobacter   Patient has been on p o  Doxycycline since and is tolerating it well  Oscar Esters, he had a persistent open foot wound   Patient is now status post flap closure of right foot   With successful flap closure, there is no further bone exposed   The best course of action at this point is to have patient continue antibiotic for 6 weeks after flap closure  Change antibiotic to Bactrim, as stated below  Treat x6 weeks after flap closure, through 5/24  Outpatient ID follow-up      2  Open right foot wound   Patient is status post flap closure, requiring return to OR twice for revision due to congestion   Wound is clean and healing well, without evidence of cellulitis   However, there was purulent drainage from incision earlier this week, but not at present   Culture of purulence was done which shows MDR Enterobacter, resistant to tetracycline and 3rd generation cephalosporin  It is sensitive to FQs and Bactrim  Discontinue cefdinir  Start oral Bactrim DS b i d  Continue through 5/24  Wound care per Plastic surgery      3  Status post screw fixation right foot on 03/04      4  Status post failed right foot reconstruction, requiring TMA   All hardware prior to TMA were removed   However, as in above, patient is status post screw fixation of right foot after TMA      5  DM, somewhat poorly controlled, with elevated hemoglobin A1c  Management per Medicine Service         Antibiotics:  Cefdinir  POD # 12    If remains in hospital, will formally re-evaluate on 04/26  Please call us with any new questions in the interim  Subjective:  No acute complaints  Denies pain, fevers or chills  Denies any antibiotic allergies  Objective:  Vitals:  Temp:  [97 4 °F (36 3 °C)-98 7 °F (37 1 °C)] 98 7 °F (37 1 °C)  HR:  [76-90] 90  Resp:  [16-18] 18  BP: (102-127)/(58-83) 127/83  SpO2:  [96 %-98 %] 96 %  Temp (24hrs), Av 1 °F (36 7 °C), Min:97 4 °F (36 3 °C), Max:98 7 °F (37 1 °C)  Current: Temperature: 98 7 °F (37 1 °C)    Physical Exam:   General:  No acute distress  HEENT:  Atraumatic normocephalic  Psychiatric:  Awake and alert  Pulmonary:  Normal respiratory excursion without accessory muscle use  Abdomen:  Soft, nontender  Extremities:  No edema  Right foot flap with mild periwound erythema and swelling  No fluctuance or active drainage  Skin:  No rashes  Neuro: Moves all extremities spontaneously    Lab Results:  I have personally reviewed pertinent labs  Results from last 7 days   Lab Units 21  0442   POTASSIUM mmol/L 4 6   CHLORIDE mmol/L 113*   CO2 mmol/L 16*   BUN mg/dL 13   CREATININE mg/dL 0 79   EGFR ml/min/1 73sq m 92   CALCIUM mg/dL 8 6         Results from last 7 days   Lab Units 21  0905   GRAM STAIN RESULT  2+ Polys*  2+ Gram negative rods*   WOUND CULTURE  2+ Growth of Enterobacter cloacae complex*  2+ Growth of        Imaging Studies:   I have personally reviewed pertinent imaging study reports and images in PACS  EKG, Pathology, and Other Studies:   I have personally reviewed pertinent reports

## 2021-04-24 NOTE — CASE MANAGEMENT
CM spoke to staff at [address]  [phone]  provided them information for authorization  [facility] NPI [id number] Dr Paulino Larose NPI [id number]  Clinical documentation faxed to  [phone]  Reference number [id number]        827 pm  Cm obtained Authorization from [address] for [facility] [id number] Plan Auth ID [id number], [address] ,Auth Approved for 4 days  Start Date 4/24/21, Next review 4/27/21  Fax Number [phone]  Approved last day 4/27 if no updates received  CM sent this information to [facility] via [place] and left message for [facility] in admissions letting him know authorization was approved and asking when they can accept pt

## 2021-04-25 LAB
BACTERIA SPEC ANAEROBE CULT: NORMAL
GLUCOSE SERPL-MCNC: 166 MG/DL (ref 65–140)
GLUCOSE SERPL-MCNC: 168 MG/DL (ref 65–140)
GLUCOSE SERPL-MCNC: 169 MG/DL (ref 65–140)
GLUCOSE SERPL-MCNC: 207 MG/DL (ref 65–140)
SARS-COV-2 RNA RESP QL NAA+PROBE: NEGATIVE

## 2021-04-25 PROCEDURE — U0005 INFEC AGEN DETEC AMPLI PROBE: HCPCS | Performed by: STUDENT IN AN ORGANIZED HEALTH CARE EDUCATION/TRAINING PROGRAM

## 2021-04-25 PROCEDURE — U0003 INFECTIOUS AGENT DETECTION BY NUCLEIC ACID (DNA OR RNA); SEVERE ACUTE RESPIRATORY SYNDROME CORONAVIRUS 2 (SARS-COV-2) (CORONAVIRUS DISEASE [COVID-19]), AMPLIFIED PROBE TECHNIQUE, MAKING USE OF HIGH THROUGHPUT TECHNOLOGIES AS DESCRIBED BY CMS-2020-01-R: HCPCS | Performed by: STUDENT IN AN ORGANIZED HEALTH CARE EDUCATION/TRAINING PROGRAM

## 2021-04-25 PROCEDURE — 99024 POSTOP FOLLOW-UP VISIT: CPT | Performed by: STUDENT IN AN ORGANIZED HEALTH CARE EDUCATION/TRAINING PROGRAM

## 2021-04-25 PROCEDURE — 82948 REAGENT STRIP/BLOOD GLUCOSE: CPT

## 2021-04-25 RX ADMIN — INSULIN GLARGINE 15 UNITS: 100 INJECTION, SOLUTION SUBCUTANEOUS at 08:34

## 2021-04-25 RX ADMIN — METOPROLOL SUCCINATE 75 MG: 50 TABLET, EXTENDED RELEASE ORAL at 21:18

## 2021-04-25 RX ADMIN — INSULIN LISPRO 1 UNITS: 100 INJECTION, SOLUTION INTRAVENOUS; SUBCUTANEOUS at 21:18

## 2021-04-25 RX ADMIN — LISINOPRIL 40 MG: 20 TABLET ORAL at 08:34

## 2021-04-25 RX ADMIN — APIXABAN 5 MG: 5 TABLET, FILM COATED ORAL at 17:08

## 2021-04-25 RX ADMIN — GABAPENTIN 100 MG: 100 CAPSULE ORAL at 08:34

## 2021-04-25 RX ADMIN — DILTIAZEM HYDROCHLORIDE 180 MG: 60 TABLET, FILM COATED ORAL at 17:08

## 2021-04-25 RX ADMIN — INSULIN LISPRO 2 UNITS: 100 INJECTION, SOLUTION INTRAVENOUS; SUBCUTANEOUS at 11:39

## 2021-04-25 RX ADMIN — INSULIN LISPRO 1 UNITS: 100 INJECTION, SOLUTION INTRAVENOUS; SUBCUTANEOUS at 08:38

## 2021-04-25 RX ADMIN — SULFAMETHOXAZOLE AND TRIMETHOPRIM 1 TABLET: 800; 160 TABLET ORAL at 08:39

## 2021-04-25 RX ADMIN — QUETIAPINE FUMARATE 12.5 MG: 25 TABLET ORAL at 08:36

## 2021-04-25 RX ADMIN — MELATONIN TAB 3 MG 3 MG: 3 TAB at 21:19

## 2021-04-25 RX ADMIN — DOCUSATE SODIUM 100 MG: 100 CAPSULE, LIQUID FILLED ORAL at 08:36

## 2021-04-25 RX ADMIN — APIXABAN 5 MG: 5 TABLET, FILM COATED ORAL at 08:35

## 2021-04-25 RX ADMIN — ATORVASTATIN CALCIUM 40 MG: 40 TABLET, FILM COATED ORAL at 17:08

## 2021-04-25 RX ADMIN — INSULIN LISPRO 1 UNITS: 100 INJECTION, SOLUTION INTRAVENOUS; SUBCUTANEOUS at 17:09

## 2021-04-25 RX ADMIN — QUETIAPINE FUMARATE 25 MG: 25 TABLET ORAL at 21:19

## 2021-04-25 RX ADMIN — BACITRACIN 1 LARGE APPLICATION: 500 OINTMENT TOPICAL at 17:23

## 2021-04-25 RX ADMIN — DOCUSATE SODIUM 100 MG: 100 CAPSULE, LIQUID FILLED ORAL at 17:08

## 2021-04-25 RX ADMIN — BACITRACIN 1 LARGE APPLICATION: 500 OINTMENT TOPICAL at 08:53

## 2021-04-25 RX ADMIN — ASPIRIN 325 MG ORAL TABLET 325 MG: 325 PILL ORAL at 08:35

## 2021-04-25 RX ADMIN — METOPROLOL SUCCINATE 75 MG: 50 TABLET, EXTENDED RELEASE ORAL at 08:36

## 2021-04-25 RX ADMIN — DILTIAZEM HYDROCHLORIDE 180 MG: 60 TABLET, FILM COATED ORAL at 08:34

## 2021-04-25 RX ADMIN — SULFAMETHOXAZOLE AND TRIMETHOPRIM 1 TABLET: 800; 160 TABLET ORAL at 21:18

## 2021-04-25 NOTE — OP NOTE
OPERATIVE REPORT  PATIENT NAME: Nano Rush  :  1950  MRN: [id number]  Pt Location: BE OR ROOM 05    SURGERY DATE: 2021    Surgeon(s) and Role:     * Kalli Vora MD - Primary     * Bhargav Samson PA-C - Assisting    Preop Diagnosis:  Type 2 diabetes mellitus with diabetic neuropathy, without long-term current use of insulin ([facility] [address] ) [E11 40]  S/P transmetatarsal amputation of foot, right ([facility] [address] ) [Z89 431]    Post-Op Diagnosis Codes: * Type 2 diabetes mellitus with diabetic neuropathy, without long-term current use of insulin (HCC) [E11 40]     * S/P transmetatarsal amputation of foot, right ([facility]) [Z89 431]    Procedure(s) (LRB):  DEBRIDEMENT RIGHT FOOT (Right) EXCISIONAL    Specimen(s):  * No specimens in log *    Estimated Blood Loss:   Minimal    Drains:  [REMOVED] Closed/Suction Drain Right Thigh Bulb 15 Fr  (Removed)   Site Description Healing 21   Dressing Status Clean;Dry; Intact 21   Drainage Appearance Bloody 21   Status To bulb suction 21 08   Intake (mL) 0 mL 21 09   Output (mL) 20 mL 21 08   Number of days: 10       [REMOVED] Open Drain Right Foot (Removed)   Number of days: 0       [REMOVED] Negative Pressure Wound Therapy (V A C ) Foot (Removed)   Number of days: 15       [REMOVED] Negative Pressure Wound Therapy (V A C ) Foot (Removed)   Unit Type VAC 21 0757   Black foam- # applied 4 21 1414   Cycle Continuous 21 0009   Target Pressure (mmHg) 125 21 0757   Canister Changed No 21   Dressing Status Clean;Dry; Intact 21 000   Output (mL) 0 mL 21 0507   Number of days: 5       [REMOVED] Urethral Catheter Temperature probe 16 Fr   (Removed)   Reasons to continue Urinary Catheter  Post-operative urological requirements 04/15/21 09   Goal for Removal Remove after 48 hrs of I/O monitoring 04/15/21 09   Site Assessment Clean;Skin intact 04/15/21 09   Collection Container Standard drainage bag 04/15/21 0900   Securement Method Securing device (Describe) 04/15/21 0900   Output (mL) 400 mL 04/15/21 0900   Number of days: 3       [REMOVED] External Urinary Catheter (Removed)   Collection Container Standard drainage bag 04/15/21 1030   Interventions Pericare performed;Removed and skin assessed 04/15/21 1030   Output (mL) 460 mL 04/15/21 1901   Number of days: 0       [REMOVED] External Urinary Catheter Small (Removed)   Collection Container Standard drainage bag 04/22/21 0813   Securement Method for Male Tape 04/22/21 0813   Interventions Pericare performed 04/22/21 0813   Output (mL) 0 mL 04/1950   Number of days: 7       Anesthesia Type:   General    Operative Indications:  Type 2 diabetes mellitus with diabetic neuropathy, without long-term current use of insulin (HCC) [E11 40]  S/P transmetatarsal amputation of foot, right ([facility] [address] ) [Z89 431]      Operative Findings:  Patient presented for an elective debridement and reconstruction with free tissue transfer  Upon the patient placing on the supine position on the operative room table it was noted that his atrial fibrillation was significantly Not controlled with worsened hyportension and pressors requirement  After further discussion with our anesthesia colleagues the plastic surgery team felt that it was not safe to proceed with free tissue transfer at this time served routine wound washout was then performed  Complications:   None    Procedure and Technique:  Site of surgery was marked verified was then taken to the operative room theater placed in supine position  After smooth anesthesia was then performed the right lower extremity was then prepped and draped in a sterile fashion a time-out was then performed  Debridement was then performed using curette and a scalpel down to healthy bleeding fascia and bone centrally removing all devitalized tissue  The wound was then irrigated with 2 L of normal saline    Negative pressure therapy was then reapplied      Right TMA stump with intact center flap but tissue loss medially and laterally with bone exposure   Right medial wound 9 x 4  X 1 cm with central bone exposure 2 x 2 cm  Right lateral wound 12 x 5 x 1 cm with central bone exposure 2 5 x 2 cm      I was present for all critical portions of the procedure    Patient Disposition:  PACU , hemodynamically stable and patient will return to OR for planned surgery as a staged procedure    SIGNATURE: Nora Patel MD  DATE: April 25, 2021  TIME: 4:06 PM

## 2021-04-25 NOTE — CASE MANAGEMENT
Left VM & sent ecin message to [facility],8W admissions to inquire about if they can take pt today

## 2021-04-25 NOTE — PROGRESS NOTES
Plastic Surgery Note     Pt seen and examined  No major events overnight, tolerating dangle protocol      Assessment: 73 y  o  male POD 12 from ALT reconstruction of R foot defect, subsequent takebacks on POD 0 and 1 for venous congestion       Doing well, no issues overnight, awaiting placement  Tolerating dangling  Flap viable     Plan:  - Continue Q4 hr flap, hamilton gomez  Ok to move to commode with leg elevated at all times  - Cont Eliquis, daily aspirin  - Cont regular diet  - Appreciate assistance of Gerontology, Cards, ID and IM  - Doxy BID through 5/24  Erythema much improved  - Daily xeroform replacement to foot  - Continue dangle per protocol:  Post Operative Dangle Protocol:     Please "dangle" the patient's RLE for the dates and times listed below  OK to have the heel rest on a pillow/chux pad  Immediately elevate leg for any darker/purple discoloration of flap     4/22: 5 min 2x a day  4/23: 5 min 3x a day  4/24: 10 min 2x a day  4/25: 10 min 3x a day  4/26, 4/27: 15 min 2x a day  4/28, 4/29: 15 min 3x a day  4/30, 5/1: 20 min 2x a day  5/2, 5/3: 20 min 3x a day  5/4: 30 min once  5/5, 5/6: 30 min 2x a day  5/7, 5/8: 30 min 3x day  5/9: 45 min once  5/10, 5/11: 45 min 2x a day  5/12 and until cleared by Dr Rito Vernon for longer: 45 min 3x a day     Again, immediately elevate leg for any darker/purple discoloration of flap      - PT and OT: pt may transfer to recliner and commode with foot elevated  Can work upper extermity strength  Will likely need a trapeze on bed, etc      - Dispo:Patient is clear for discharge from plastic surgery standpoint  Awaiting insurance authorization for facility       Cheyanne Bueno MD   [facility]   [address], [address]   [address]   Office: [phone]

## 2021-04-25 NOTE — OP NOTE
OPERATIVE REPORT  PATIENT NAME: Uvaldo Kirkland  :  1950  MRN: [id number]  Pt Location: BE OR ROOM 05    SURGERY DATE: 2021    Surgeon(s) and Role:     * Davina Aleman MD - Primary     * Manolo Lawler PA-C - Assisting    Preop Diagnosis:  Type 2 diabetes mellitus with diabetic neuropathy, without long-term current use of insulin ([address] ) [E11 40]  S/P transmetatarsal amputation of foot, right ([address] ) [Z89 431]    Post-Op Diagnosis Codes: * Type 2 diabetes mellitus with diabetic neuropathy, without long-term current use of insulin (HCC) [E11 40]     * S/P transmetatarsal amputation of foot, right (HCC) [Z89 431]    Procedure(s) (LRB):  TAKEBACK RECONSTRUCTION MICROSURGICAL W/ FREE FLAP (Right)  MICROVASCULAR ANASTOMOSIS REVISION UNDER MICROSCOPE    Specimen(s):  * No specimens in log *    Estimated Blood Loss:   Minimal    Drains:  [REMOVED] Closed/Suction Drain Right Thigh Bulb 15 Fr  (Removed)   Site Description Healing 21   Dressing Status Clean;Dry; Intact 21   Drainage Appearance Bloody 21 08   Status To bulb suction 21 0813   Intake (mL) 0 mL 21 09   Output (mL) 20 mL 21 0813   Number of days: 10       [REMOVED] Open Drain Right Foot (Removed)   Number of days: 0       [REMOVED] Negative Pressure Wound Therapy (V A C ) Foot (Removed)   Number of days: 15       [REMOVED] Negative Pressure Wound Therapy (V A C ) Foot (Removed)   Unit Type VAC 21 0757   Black foam- # applied 4 21 1414   Cycle Continuous 21 0009   Target Pressure (mmHg) 125 21 0757   Canister Changed No 21 075   Dressing Status Clean;Dry; Intact 21 000   Output (mL) 0 mL 21 0507   Number of days: 5       [REMOVED] Urethral Catheter Temperature probe 16 Fr   (Removed)   Reasons to continue Urinary Catheter  Post-operative urological requirements 04/15/21 0900   Goal for Removal Remove after 48 hrs of I/O monitoring 04/15/21 09   Site Assessment Clean;Skin intact 04/15/21 0900   Collection Container Standard drainage bag 04/15/21 0900   Securement Method Securing device (Describe) 04/15/21 0900   Output (mL) 400 mL 04/15/21 0900   Number of days: 3       [REMOVED] External Urinary Catheter (Removed)   Collection Container Standard drainage bag 04/15/21 1030   Interventions Pericare performed;Removed and skin assessed 04/15/21 1030   Output (mL) 460 mL 04/15/21 1901   Number of days: 0       [REMOVED] External Urinary Catheter Small (Removed)   Collection Container Standard drainage bag 04/22/21 0813   Securement Method for Male Tape 04/22/21 0813   Interventions Pericare performed 04/22/21 0813   Output (mL) 0 mL 04/1950   Number of days: 7       Anesthesia Type:   General    Operative Indications:  Type 2 diabetes mellitus with diabetic neuropathy, without long-term current use of insulin (HCC) [E11 40]  S/P transmetatarsal amputation of foot, right ([facility] [address] ) [Z89 431]      Operative Findings:  Mild congestion preoperative was noticed despite adequate signal   Superficial system venous anastomosis was revised with adequate flow  signal and improved congestion   Adequate perfusion on Dorsalis pedis artery and vein anastomosis clinically  Complications:   Flap congestion     Procedure and Technique:  During flap checks on the floor a was noticed to have slight drop on the Vioptix a mild congestion of the skin paddle the prompted surgical team evaluation and it was felt that surgical exploration was warranted  The patient was then taken to the operative theater placed in supine position  After all the necessary perioperative measures were taken and smooth anesthesia was then induced the right lower extremity was then prepped and draped in usual sterile fashion a time-out was then performed  The proximal suture line on the skin paddle was then removed and the flap was then partially DIS inset from location    With the help of the Doppler devised and clinical evaluation the anterograde flow to the dorsalis pedis artery and vein was noted to be adequate with good flow and bounding pulse try the whole pedicle down to the skin paddle  The superficial venous anastomosis to the vena comitans of the flap was noticed to have perhaps a slight twist in no adequate flow so it was decided the revision of this anastomosis was needed  Acland clamps were then placed proximally and the anastomosis was then transected noticing no evidence of clot within the lumen  The same vein was then dissected free and the venous anastomosis was then reperformed using the same 2 0 full  with satisfactory clinically float to the anastomosis upon clamp releases and strip test performed multiple times  The skin paddle was noted to be relief of congestion and the by optics perfusion improved  So the flap was then reincised in place using the same interrupted 4-0 nylon suture with careful attention to avoid tension along the pedicle site recipient closure    xerofrom and dry dressing was then applied the conclusion of the case     I was present for the entire procedure    Patient Disposition:  PACU  and hemodynamically stable    SIGNATURE: Asad Valladares MD  DATE: April 25, 2021  TIME: 3:44 PM

## 2021-04-25 NOTE — CASE MANAGEMENT
Pt's wife informed she s/w [facility] covering at [facility] for admissions  TC to [facility] [phone] & aware we received auth  She didn't have pt on list for admission over w/e & will accept tomorrow  She requested covid tbd  Plastics aware & entered  TC to wife & s/w pt to update them

## 2021-04-25 NOTE — OP NOTE
OPERATIVE REPORT  PATIENT NAME: Gema Magallon  :  1950  MRN: [id number]  Pt Location: BE OR ROOM 05    SURGERY DATE: 2021    Surgeon(s) and Role:     * Lili Madrid MD - Primary     * Sammy Sewell PA-C - Assisting    Preop Diagnosis:  Type 2 diabetes mellitus with diabetic neuropathy, without long-term current use of insulin ([facility] [address] ) [E11 40]  S/P transmetatarsal amputation of foot, right ([facility] [address] ) [Z89 431]    Post-Op Diagnosis Codes: * Type 2 diabetes mellitus with diabetic neuropathy, without long-term current use of insulin (HCC) [E11 40]     * S/P transmetatarsal amputation of foot, right (HCC) [Z89 431]    Procedure(s) (LRB):  RECONSTRUCTION MICROSURGICAL W/ FREE FLAP, RE EXPLORATION, MICRO VASCULAR REVISION (Right)   EXPOSURE OF LEFT GREATER SAPHENOUS VEIN FOR GRAFTING    Specimen(s):  * No specimens in log *    Estimated Blood Loss:   Minimal    Drains:  [REMOVED] Closed/Suction Drain Right Thigh Bulb 15 Fr  (Removed)   Site Description Healing 21   Dressing Status Clean;Dry; Intact 21   Drainage Appearance Bloody 21 08   Status To bulb suction 21 0813   Intake (mL) 0 mL 21 0902   Output (mL) 20 mL 21 0813   Number of days: 10       [REMOVED] Open Drain Right Foot (Removed)   Number of days: 0       [REMOVED] Negative Pressure Wound Therapy (V A C ) Foot (Removed)   Number of days: 15       [REMOVED] Negative Pressure Wound Therapy (V A C ) Foot (Removed)   Unit Type VAC 21 0757   Black foam- # applied 4 21 1414   Cycle Continuous 21 0009   Target Pressure (mmHg) 125 21 0757   Canister Changed No 21 075   Dressing Status Clean;Dry; Intact 21 000   Output (mL) 0 mL 21 0507   Number of days: 5       [REMOVED] Urethral Catheter Temperature probe 16 Fr   (Removed)   Reasons to continue Urinary Catheter  Post-operative urological requirements 04/15/21 0900   Goal for Removal Remove after 48 hrs of I/O monitoring 04/15/21 0900   Site Assessment Clean;Skin intact 04/15/21 0900   Collection Container Standard drainage bag 04/15/21 0900   Securement Method Securing device (Describe) 04/15/21 0900   Output (mL) 400 mL 04/15/21 0900   Number of days: 3       [REMOVED] External Urinary Catheter (Removed)   Collection Container Standard drainage bag 04/15/21 1030   Interventions Pericare performed;Removed and skin assessed 04/15/21 1030   Output (mL) 460 mL 04/15/21 1901   Number of days: 0       [REMOVED] External Urinary Catheter Small (Removed)   Collection Container Standard drainage bag 04/22/21 0813   Securement Method for Male Tape 04/22/21 0813   Interventions Pericare performed 04/22/21 0813   Output (mL) 0 mL 04/1950   Number of days: 7       Anesthesia Type:   General    Operative Indications:  Type 2 diabetes mellitus with diabetic neuropathy, without long-term current use of insulin (HCC) [E11 40]  S/P transmetatarsal amputation of foot, right ([facility] [address] ) [Z89 431]    Operative Findings:  Mild congestion was again noticed   Adequate inflow despite worsen in atrial fibrillation control and intermitted hypotension  Both venous anastomosis were revised: DP vein anastomosis performed 1 cm and superficial system was anastomosed to greater saphenous vein directly   Significant improvement     Complications:   Flap congestion     Procedure and Technique:  During routine flap checks it was noted a slight drop in the Vioptix and again evidence of mild congestion  His surgical team was then called to the bedside and was deemed necessary that surgical exploration was warranted  Risks and benefits of the procedure were discussed in detail to the patient including the possibility of vein graft harvested as well as possible failure of flap that will require a 2nd flap intent versus amputation    Patient manifest reasonable understanding of his options and wishes to proceed with surgery and was also discussed with his wife who has agreed with this  The patient was taken to the operative room theater placed in supine position  After smooth anesthesia was then induced, bilateral lower extremity was then prepped and draped in usual sterile fashion and a time-out was then performed  Proximal skin paddle suture line was also released in the same fashion and the anastomosis was then explore  There was adequate inflow to the arterial anastomosis but note adequate outflow through both the superficial and the deep system  So it was deemed necessary to revise both venous anastomosis  The left lower extremity was prepared for potential vein graft by dissecting a 10 cm segment of greater saphenous vein and left in situ the anticipation for potential vein grafting need  This was made by marking the location of the vein with ultrasound along the left medial lower extremity incised with a scalpel and blunt and sharp dissection performed with scissors and bipolar as well as hemoclips as needed  Papaverine solution was used  The vein and left a moist saline anticipation for any  Ipsilateral great saphenous vein was then dissected by following the previous branch and all the surrounding branches were then clipped with hemoclips and the vein was then brought into our recipient wound bed for microvascular anastomosis  This was felt to be very thick and disease but of adequate caliber  Both venous anastomosis were then transected and upon exploration some clotting was noticed along the dorsalis pedis venous anastomosis  3000 units of systemic heparin was then given bed the the Anesthesia colleagues  The vein was then dissected proximally noticing that it was not of any significant larger caliber  Microvascular anastomosis was then performed the same fashion using a 2 5 mm flow  1 cm cephalad from prior location    The superficial system was anastomosed using a 2 0 mm flow  from the flap vena comitant directly to the ipsilateral greater saphenous vein  Of notice this vein was very thickened with small lumen despite large caliber of the vessel and it was felt to be of not the same quality compared to the contralateral lower extremity  A bone clamp released the flap was noted to be very well perfused with relief of the congestion and improved upper symmetry on the Vioptix  Strip test was performed multiple times confirming good outflow to the flap  It was decided that vein grafting was not required given than vessels were within reach and veins were not of larger caliber cephalad  After prolonged time in the operative room confirming persistent good perfusion and outflow to the flap attention was then turned to flap inset in the same fashion using interrupted 4-0 nylon sutures on the proximal suture line  Xeroform dry dressing was then applied the conclusion of the case  I was present for the entire procedure    Patient Disposition:  PACU  and hemodynamically stable   The plan is to keep the patient under systemic anticoagulation given the intraoperative findings      SIGNATURE: Bob Borjas MD  DATE: April 25, 2021  TIME: 3:52 PM

## 2021-04-26 ENCOUNTER — ANESTHESIA EVENT (INPATIENT)
Dept: PERIOP | Facility: HOSPITAL | Age: 71
DRG: 463 | End: 2021-04-26
Payer: COMMERCIAL

## 2021-04-26 LAB
GLUCOSE SERPL-MCNC: 119 MG/DL (ref 65–140)
GLUCOSE SERPL-MCNC: 170 MG/DL (ref 65–140)
GLUCOSE SERPL-MCNC: 175 MG/DL (ref 65–140)
GLUCOSE SERPL-MCNC: 185 MG/DL (ref 65–140)

## 2021-04-26 PROCEDURE — NC001 PR NO CHARGE: Performed by: PLASTIC SURGERY

## 2021-04-26 PROCEDURE — 99232 SBSQ HOSP IP/OBS MODERATE 35: CPT | Performed by: INTERNAL MEDICINE

## 2021-04-26 PROCEDURE — 82948 REAGENT STRIP/BLOOD GLUCOSE: CPT

## 2021-04-26 RX ORDER — SODIUM CHLORIDE, SODIUM LACTATE, POTASSIUM CHLORIDE, CALCIUM CHLORIDE 600; 310; 30; 20 MG/100ML; MG/100ML; MG/100ML; MG/100ML
125 INJECTION, SOLUTION INTRAVENOUS CONTINUOUS
Status: DISCONTINUED | OUTPATIENT
Start: 2021-04-27 | End: 2021-04-28

## 2021-04-26 RX ADMIN — DILTIAZEM HYDROCHLORIDE 180 MG: 60 TABLET, FILM COATED ORAL at 08:58

## 2021-04-26 RX ADMIN — MELATONIN TAB 3 MG 3 MG: 3 TAB at 21:06

## 2021-04-26 RX ADMIN — ATORVASTATIN CALCIUM 40 MG: 40 TABLET, FILM COATED ORAL at 16:58

## 2021-04-26 RX ADMIN — BACITRACIN 1 LARGE APPLICATION: 500 OINTMENT TOPICAL at 09:25

## 2021-04-26 RX ADMIN — DILTIAZEM HYDROCHLORIDE 180 MG: 60 TABLET, FILM COATED ORAL at 17:05

## 2021-04-26 RX ADMIN — QUETIAPINE FUMARATE 12.5 MG: 25 TABLET ORAL at 09:00

## 2021-04-26 RX ADMIN — DOCUSATE SODIUM AND SENNOSIDES 1 TABLET: 8.6; 5 TABLET ORAL at 21:06

## 2021-04-26 RX ADMIN — ASPIRIN 325 MG ORAL TABLET 325 MG: 325 PILL ORAL at 08:58

## 2021-04-26 RX ADMIN — POLYETHYLENE GLYCOL 3350 17 G: 17 POWDER, FOR SOLUTION ORAL at 09:02

## 2021-04-26 RX ADMIN — DOCUSATE SODIUM 100 MG: 100 CAPSULE, LIQUID FILLED ORAL at 17:05

## 2021-04-26 RX ADMIN — ERTAPENEM SODIUM 1000 MG: 1 INJECTION, POWDER, LYOPHILIZED, FOR SOLUTION INTRAMUSCULAR; INTRAVENOUS at 10:18

## 2021-04-26 RX ADMIN — METOPROLOL SUCCINATE 75 MG: 50 TABLET, EXTENDED RELEASE ORAL at 08:59

## 2021-04-26 RX ADMIN — LISINOPRIL 40 MG: 20 TABLET ORAL at 08:59

## 2021-04-26 RX ADMIN — DOCUSATE SODIUM 100 MG: 100 CAPSULE, LIQUID FILLED ORAL at 08:59

## 2021-04-26 RX ADMIN — METOPROLOL SUCCINATE 75 MG: 50 TABLET, EXTENDED RELEASE ORAL at 21:06

## 2021-04-26 RX ADMIN — GABAPENTIN 100 MG: 100 CAPSULE ORAL at 08:58

## 2021-04-26 RX ADMIN — INSULIN LISPRO 1 UNITS: 100 INJECTION, SOLUTION INTRAVENOUS; SUBCUTANEOUS at 17:07

## 2021-04-26 RX ADMIN — QUETIAPINE FUMARATE 25 MG: 25 TABLET ORAL at 21:06

## 2021-04-26 RX ADMIN — INSULIN LISPRO 1 UNITS: 100 INJECTION, SOLUTION INTRAVENOUS; SUBCUTANEOUS at 12:21

## 2021-04-26 RX ADMIN — INSULIN LISPRO 1 UNITS: 100 INJECTION, SOLUTION INTRAVENOUS; SUBCUTANEOUS at 21:07

## 2021-04-26 RX ADMIN — INSULIN GLARGINE 15 UNITS: 100 INJECTION, SOLUTION SUBCUTANEOUS at 09:13

## 2021-04-26 NOTE — PLAN OF CARE
Problem: Potential for Falls  Goal: Patient will remain free of falls  Description: INTERVENTIONS:  - Assess patient frequently for physical needs  -  Identify cognitive and physical deficits and behaviors that affect risk of falls  -  [place] fall precautions as indicated by assessment   - Educate patient/family on patient safety including physical limitations  - Instruct patient to call for assistance with activity based on assessment  - Modify environment to reduce risk of injury  - Consider OT/PT consult to assist with strengthening/mobility  Outcome: Progressing     Problem: SAFETY ADULT  Goal: Patient will remain free of falls  Description: INTERVENTIONS:  - Assess patient frequently for physical needs  -  Identify cognitive and physical deficits and behaviors that affect risk of falls    -  [place] fall precautions as indicated by assessment   - Educate patient/family on patient safety including physical limitations  - Instruct patient to call for assistance with activity based on assessment  - Modify environment to reduce risk of injury  - Consider OT/PT consult to assist with strengthening/mobility  Outcome: Progressing     Problem: PAIN - ADULT  Goal: Verbalizes/displays adequate comfort level or baseline comfort level  Description: Interventions:  - Encourage patient to monitor pain and request assistance  - Assess pain using appropriate pain scale  - Administer analgesics based on type and severity of pain and evaluate response  - Implement non-pharmacological measures as appropriate and evaluate response  - Consider cultural and social influences on pain and pain management  - Notify physician/advanced practitioner if interventions unsuccessful or patient reports new pain  Outcome: Adequate for Discharge     Problem: INFECTION - ADULT  Goal: Absence or prevention of progression during hospitalization  Description: INTERVENTIONS:  - Assess and monitor for signs and symptoms of infection  - Monitor lab/diagnostic results  - Monitor all insertion sites, i e  indwelling lines, tubes, and drains  - Monitor endotracheal if appropriate and nasal secretions for changes in amount and color  - Theresa appropriate cooling/warming therapies per order  - Administer medications as ordered  - Instruct and encourage patient and family to use good hand hygiene technique  - Identify and instruct in appropriate isolation precautions for identified infection/condition  Outcome: Adequate for Discharge     Problem: SAFETY ADULT  Goal: Maintain or return mobility status to optimal level  Description: INTERVENTIONS:  - Assess patient's baseline mobility status (ambulation, transfers, stairs, etc )    - Identify cognitive and physical deficits and behaviors that affect mobility  - Identify mobility aids required to assist with transfers and/or ambulation (gait belt, sit-to-stand, lift, walker, cane, etc )  - [place] fall precautions as indicated by assessment  - Record patient progress and toleration of activity level on Mobility SBAR; progress patient to next Phase/Stage  - Instruct patient to call for assistance with activity based on assessment  - Consider rehabilitation consult to assist with strengthening/weightbearing, etc   Outcome: Adequate for Discharge     Problem: Knowledge Deficit  Goal: Patient/family/caregiver demonstrates understanding of disease process, treatment plan, medications, and discharge instructions  Description: Complete learning assessment and assess knowledge base    Interventions:  - Provide teaching at level of understanding  - Provide teaching via preferred learning methods  Outcome: Adequate for Discharge     Problem: Prexisting or High Potential for Compromised Skin Integrity  Goal: Skin integrity is maintained or improved  Description: INTERVENTIONS:  - Identify patients at risk for skin breakdown  - Assess and monitor skin integrity  - Assess and monitor nutrition and hydration status  - Monitor labs   - Assess for incontinence   - Turn and reposition patient  - Assist with mobility/ambulation  - Relieve pressure over bony prominences  - Avoid friction and shearing  - Provide appropriate hygiene as needed including keeping skin clean and dry  - Evaluate need for skin moisturizer/barrier cream  - Collaborate with interdisciplinary team   - Patient/family teaching  - Consider wound care consult   Outcome: Adequate for Discharge     Problem: CONFUSION/THOUGHT DISTURBANCE  Goal: Thought disturbances (confusion, delirium, depression, dementia or psychosis) are managed to maintain or return to baseline mental status and functional level  Description: INTERVENTIONS:  - Assess for possible contributors to  thought disturbance, including but not limited to medications, infection, impaired vision or hearing, underlying metabolic abnormalities, dehydration, respiratory compromise,  psychiatric diagnoses and notify attending PHYSICAN/AP  - Monitor and intervene to maintain adequate nutrition, hydration, elimination, sleep and activity  - Decrease environmental stimuli, including noise as appropriate  - Provide frequent contacts to provide refocusing, direction and reassurance as needed  Approach patient calmly with eye contact and at their level    - Rand high risk fall precautions, aspiration precautions and other safety measures, as indicated  - If delirium suspected, notify physician/AP of change in condition and request immediate in-person evaluation  - Pursue consults as appropriate including Geriatric (campus dependent), OT for cognitive evaluation/activity planning, psychiatric, pastoral care, etc   Outcome: Adequate for Discharge     Problem: METABOLIC, FLUID AND ELECTROLYTES - ADULT  Goal: Electrolytes maintained within normal limits  Description: INTERVENTIONS:  - Monitor labs and assess patient for signs and symptoms of electrolyte imbalances  - Administer electrolyte replacement as ordered  - Monitor response to electrolyte replacements, including repeat lab results as appropriate  - Instruct patient on fluid and nutrition as appropriate  Outcome: Adequate for Discharge  Goal: Glucose maintained within target range  Description: INTERVENTIONS:  - Monitor Blood Glucose as ordered  - Assess for signs and symptoms of hyperglycemia and hypoglycemia  - Administer ordered medications to maintain glucose within target range  - Assess nutritional intake and initiate nutrition service referral as needed  Outcome: Adequate for Discharge     Problem: SKIN/TISSUE INTEGRITY - ADULT  Goal: Skin integrity remains intact  Description: INTERVENTIONS  - Identify patients at risk for skin breakdown  - Assess and monitor skin integrity  - Assess and monitor nutrition and hydration status  - Monitor labs (i e  albumin)  - Assess for incontinence   - Turn and reposition patient  - Assist with mobility/ambulation  - Relieve pressure over bony prominences  - Avoid friction and shearing  - Provide appropriate hygiene as needed including keeping skin clean and dry  - Evaluate need for skin moisturizer/barrier cream  - Collaborate with interdisciplinary team (i e  Nutrition, Rehabilitation, etc )   - Patient/family teaching  Outcome: Adequate for Discharge  Goal: Incision(s), wounds(s) or drain site(s) healing without S/S of infection  Description: INTERVENTIONS  - Assess and document risk factors for skin impairment   - Assess and document dressing, incision, wound bed, drain sites and surrounding tissue  - Consider nutrition services referral as needed  - Oral mucous membranes remain intact  - Provide patient/ family education  Outcome: Adequate for Discharge

## 2021-04-26 NOTE — CASE MANAGEMENT
HELDER received VM from Yumiko Perez at [facility] [place]  Requesting a call back  CM placed call to [facility] [phone]  HELDER left message, my contact information and requested a call back  Helder spoke with Wale Pacheco and will need to be resubmitted when pt ready for discharge

## 2021-04-26 NOTE — PROGRESS NOTES
Progress Note - Geriatric Medicine   Arch [place]  73 y o  male MRN: [id number]  Unit/Bed#: [facility] 525-01 Encounter: [id number]      Assessment/Plan:    Acute metabolic encephalopathy  -resolved, patient remains back at baseline mentation increased risk of recurrence given anticipated surgical intervention tomorrow  -given symptoms have been well controlled on Seroquel 12 5 mg BID since resolution of last episode encephalopathy and have maintained controlled on current dosing recommend continuation of this dosing  -continue delirium precautions including ensuring pain is well controlled, maintain normal circadian rhythm and monitoring for fecal and urinary tension  -continue psychosocial support of patient and family at bedside  -continue supportive cares    Possible residual osteomyelitis right foot  -currently on ertapenem as managed by ID    Ambulatory dysfunction  -continue to encourage good , mobilization only as outlined by Plastic surgery  -has been doing well with PT and OT, anticipating STR on DC    Impaired hearing  -encourage providers to speak loudly and clearly  -recommend use of teach back method to ensure clear communication  -use of hearing aids at all appropriate times    Impaired vision  -continue to encourage use of corrective lenses all appropriate times  -consider large print font for any printed materials provided to patient    Deconditioning/debility/frailty  -continue optimization nutritional intake and chronic medical co-morbidities  -mood and affect significant improved, continue psychosocial supports    Subjective:     Patient seen examined bedside where he sitting resting comfortably watching television with his wife at his side  He reports he is finally getting his appetite back and he has been eating well, sleeping well overnight, not not had any return of delirium since last medication adjustment    His wife explains that she is slightly anxious that he will have recurrence of encephalopathy postoperatively as he did at his last surgical intervention however he has since been started on and stable on low-dose Seroquel and more strict delirium precautions will remain in place, explained will continue strict delirium precautions and supportive cares and are on higher alert for even early presence of the symptoms now knowing that he has had them in the past and can be intervened on much earlier if in fact they do occur  Encourage eating good upper this evening and getting good sleep focus on positive memories before surgery and will follow up tomorrow  Review of Systems   Constitutional: Positive for appetite change (improving )  Negative for chills and fever  HENT: Negative for congestion  Eyes: Negative  Respiratory: Negative  Cardiovascular: Negative  Gastrointestinal: Negative  Endocrine: Negative  Genitourinary: Negative  Musculoskeletal: Negative  Skin: Positive for wound (right foot )  Neurological: Negative for dizziness and light-headedness  Hematological: Negative  Psychiatric/Behavioral: Negative for confusion, decreased concentration, dysphoric mood and sleep disturbance (sleeping well )  All other systems reviewed and are negative  Objective:     Vitals: Blood pressure 119/66, pulse 65, temperature 98 4 °F (36 9 °C), resp  rate 20, height 6' 5" (1 956 m), weight 99 1 kg (218 lb 7 6 oz), SpO2 99 %  ,Body mass index is 25 91 kg/m²  Intake/Output Summary (Last 24 hours) at 4/26/2021 1543  Last data filed at 4/26/2021 1201  Gross per 24 hour   Intake 540 ml   Output 1225 ml   Net -685 ml     Current Medications: Reviewed    Physical Exam:   Physical Exam  Vitals signs and nursing note reviewed  Constitutional:       General: He is not in acute distress  Comments: Sitting resting comfortably, wife at bedside   HENT:      Head: Normocephalic and atraumatic        Nose: Nose normal       Mouth/Throat:      Mouth: Mucous membranes are moist    Eyes:      General:         Right eye: No discharge  Left eye: No discharge  Neck:      Musculoskeletal: Neck supple  Comments: Trachea midline, phonation normal  Cardiovascular:      Rate and Rhythm: Normal rate  Pulmonary:      Effort: No respiratory distress  Musculoskeletal:      Comments: Dressing over right foot surgical site   Diffuse subcutaneous fat/muscle wasting   Skin:     General: Skin is warm and dry  Neurological:      Mental Status: He is alert  Comments: Awake and alert, answers questions appropriately, mood and affect significantly brighter and more easily engaged   Psychiatric:      Comments: Pleasant and cooperative        Invasive Devices     Peripheral Intravenous Line            Peripheral IV 04/20/21 Left Forearm 6 days              Lab, Imaging and other studies: I have personally reviewed pertinent reports

## 2021-04-26 NOTE — PROGRESS NOTES
Progress Note - Plastic Surgery   Baron Britt  73 y o  male MRN: [id number]  Unit/Bed#: [facility] 525-01 Encounter: [id number]    Assessment:  Principal Problem:    S/P transmetatarsal amputation of foot, right (HCC)  Active Problems:    Atrial fibrillation with RVR ([facility] [address] )    Hyperlipidemia    Type 2 diabetes mellitus with diabetic neuropathy ([address] )    Essential hypertension    Acquired absence of other right toe(s) ([address] )    Deep disruption or dehiscence of operation wound    Ambulatory dysfunction    Diabetic ulcer of right 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, with fat layer exposed ([address] )    Sleep disturbance    73 y o  male POD 14 from ALT reconstruction of R foot defect, subsequent takebacks on POD 0 and 1 for venous congestion  Overall doing well  Flap has dehisced at the most distal portion of the flap with exposed hardware  Clinically stable    VSS, A fib rate controlled     Plan:  - Hold discharge for today as flap has dehisced  Discussed with Dr Alena Woodson, will plan to take to OR tomorrow for washout   - Cont Eliquis (will hold for OR), daily aspirin  - Cont regular diet, off IVF  - Appreciate assistance of Gerontology, Cards, ID and IM  - Abx per ID  - Daily xeroform replacement to foot  - Continue dangle per protocol:  Post Operative Dangle Protocol:    Please "dangle" the patient's RLE for the dates and times listed below  OK to have the heel rest on a pillow/chux pad   Immediately elevate leg for any darker/purple discoloration of flap    4/22: 5 min 2x a day  4/23: 5 min 3x a day  4/24: 10 min 2x a day  4/25: 10 min 3x a day  4/26, 4/27: 15 min 2x a day  4/28, 4/29: 15 min 3x a day  4/30, 5/1: 20 min 2x a day  5/2, 5/3: 20 min 3x a day  5/4: 30 min once  5/5, 5/6: 30 min 2x a day  5/7, 5/8: 30 min 3x day  5/9: 45 min once  5/10, 5/11: 45 min 2x a day  5/12 and until cleared by Dr Alena Woodson for longer: 45 min 3x a day    Again, immediately elevate leg for any darker/purple discoloration of flap     - Discussed with Dr Stone Webster: discussed with CM, working toward facility discharged    Diaz Tirado MD  Resident, Plastic & Reconstructive Surgery  [phone] (c)     Subjective/Objective   Chief Complaint: R foot wound    Subjective: resting in bed  Pain controlled  Objective:     Blood pressure 142/82, pulse 75, temperature 97 7 °F (36 5 °C), resp  rate 18, height 6' 5" (1 956 m), weight 99 1 kg (218 lb 7 6 oz), SpO2 100 %  ,Body mass index is 25 91 kg/m²  Intake/Output Summary (Last 24 hours) at 4/26/2021 0826  Last data filed at 4/26/2021 1398  Gross per 24 hour   Intake 660 ml   Output 675 ml   Net -15 ml       Invasive Devices     Peripheral Intravenous Line            Peripheral IV 04/20/21 Left Forearm 6 days                Physical Exam:   Gen: NAD  Neuro: Alert, oriented  CV: regular rate  Pulm: no respiratory distress  Abd: nondistended  GI: condom catheter  Ext: RLE free flap is pink, soft, warm  Cap refill is about 2 seconds  New dehiscence at the distal and medial aspects of flap with purulent drainage         Lab, Imaging and other studies:I have personally reviewed pertinent lab results      VTE Pharmacologic Prophylaxis: Sequential compression device (Venodyne), Eliquis   VTE Mechanical Prophylaxis: sequential compression device

## 2021-04-27 ENCOUNTER — APPOINTMENT (INPATIENT)
Dept: RADIOLOGY | Facility: HOSPITAL | Age: 71
DRG: 463 | End: 2021-04-27
Payer: COMMERCIAL

## 2021-04-27 ENCOUNTER — ANESTHESIA (INPATIENT)
Dept: PERIOP | Facility: HOSPITAL | Age: 71
DRG: 463 | End: 2021-04-27
Payer: COMMERCIAL

## 2021-04-27 LAB
GLUCOSE SERPL-MCNC: 161 MG/DL (ref 65–140)
GLUCOSE SERPL-MCNC: 162 MG/DL (ref 65–140)
GLUCOSE SERPL-MCNC: 174 MG/DL (ref 65–140)
GLUCOSE SERPL-MCNC: 190 MG/DL (ref 65–140)
GLUCOSE SERPL-MCNC: 199 MG/DL (ref 65–140)

## 2021-04-27 PROCEDURE — 87176 TISSUE HOMOGENIZATION CULTR: CPT | Performed by: PLASTIC SURGERY

## 2021-04-27 PROCEDURE — 87186 SC STD MICRODIL/AGAR DIL: CPT | Performed by: PLASTIC SURGERY

## 2021-04-27 PROCEDURE — 87077 CULTURE AEROBIC IDENTIFY: CPT | Performed by: PLASTIC SURGERY

## 2021-04-27 PROCEDURE — 82948 REAGENT STRIP/BLOOD GLUCOSE: CPT

## 2021-04-27 PROCEDURE — 0HBHXZZ EXCISION OF RIGHT UPPER LEG SKIN, EXTERNAL APPROACH: ICD-10-PCS | Performed by: PLASTIC SURGERY

## 2021-04-27 PROCEDURE — 20680 REMOVAL OF IMPLANT DEEP: CPT | Performed by: PLASTIC SURGERY

## 2021-04-27 PROCEDURE — 0QP804Z REMOVAL OF INTERNAL FIXATION DEVICE FROM RIGHT FEMORAL SHAFT, OPEN APPROACH: ICD-10-PCS | Performed by: PLASTIC SURGERY

## 2021-04-27 PROCEDURE — 99232 SBSQ HOSP IP/OBS MODERATE 35: CPT | Performed by: INTERNAL MEDICINE

## 2021-04-27 PROCEDURE — 99024 POSTOP FOLLOW-UP VISIT: CPT | Performed by: PLASTIC SURGERY

## 2021-04-27 PROCEDURE — 87205 SMEAR GRAM STAIN: CPT | Performed by: PLASTIC SURGERY

## 2021-04-27 PROCEDURE — 87075 CULTR BACTERIA EXCEPT BLOOD: CPT | Performed by: PLASTIC SURGERY

## 2021-04-27 PROCEDURE — 87070 CULTURE OTHR SPECIMN AEROBIC: CPT | Performed by: PLASTIC SURGERY

## 2021-04-27 PROCEDURE — 73630 X-RAY EXAM OF FOOT: CPT

## 2021-04-27 PROCEDURE — 0JBQ0ZZ EXCISION OF RIGHT FOOT SUBCUTANEOUS TISSUE AND FASCIA, OPEN APPROACH: ICD-10-PCS | Performed by: PLASTIC SURGERY

## 2021-04-27 RX ORDER — PROPOFOL 10 MG/ML
INJECTION, EMULSION INTRAVENOUS AS NEEDED
Status: DISCONTINUED | OUTPATIENT
Start: 2021-04-27 | End: 2021-04-27

## 2021-04-27 RX ORDER — PROPOFOL 10 MG/ML
INJECTION, EMULSION INTRAVENOUS CONTINUOUS PRN
Status: DISCONTINUED | OUTPATIENT
Start: 2021-04-27 | End: 2021-04-27

## 2021-04-27 RX ORDER — LIDOCAINE HYDROCHLORIDE 10 MG/ML
INJECTION, SOLUTION EPIDURAL; INFILTRATION; INTRACAUDAL; PERINEURAL AS NEEDED
Status: DISCONTINUED | OUTPATIENT
Start: 2021-04-27 | End: 2021-04-27

## 2021-04-27 RX ORDER — EPHEDRINE SULFATE 50 MG/ML
INJECTION INTRAVENOUS AS NEEDED
Status: DISCONTINUED | OUTPATIENT
Start: 2021-04-27 | End: 2021-04-27

## 2021-04-27 RX ORDER — MAGNESIUM HYDROXIDE 1200 MG/15ML
LIQUID ORAL AS NEEDED
Status: DISCONTINUED | OUTPATIENT
Start: 2021-04-27 | End: 2021-04-27 | Stop reason: HOSPADM

## 2021-04-27 RX ADMIN — DOCUSATE SODIUM 100 MG: 100 CAPSULE, LIQUID FILLED ORAL at 09:17

## 2021-04-27 RX ADMIN — ATORVASTATIN CALCIUM 40 MG: 40 TABLET, FILM COATED ORAL at 17:01

## 2021-04-27 RX ADMIN — INSULIN GLARGINE 15 UNITS: 100 INJECTION, SOLUTION SUBCUTANEOUS at 09:23

## 2021-04-27 RX ADMIN — PROPOFOL 70 MG: 10 INJECTION, EMULSION INTRAVENOUS at 13:32

## 2021-04-27 RX ADMIN — PROPOFOL 50 MCG/KG/MIN: 10 INJECTION, EMULSION INTRAVENOUS at 13:32

## 2021-04-27 RX ADMIN — METOPROLOL SUCCINATE 75 MG: 50 TABLET, EXTENDED RELEASE ORAL at 21:12

## 2021-04-27 RX ADMIN — SODIUM CHLORIDE, SODIUM LACTATE, POTASSIUM CHLORIDE, AND CALCIUM CHLORIDE 125 ML/HR: .6; .31; .03; .02 INJECTION, SOLUTION INTRAVENOUS at 09:19

## 2021-04-27 RX ADMIN — BACITRACIN 1 LARGE APPLICATION: 500 OINTMENT TOPICAL at 09:19

## 2021-04-27 RX ADMIN — PHENYLEPHRINE HYDROCHLORIDE 200 MCG: 10 INJECTION INTRAVENOUS at 13:40

## 2021-04-27 RX ADMIN — EPHEDRINE SULFATE 10 MG: 50 INJECTION, SOLUTION INTRAVENOUS at 13:38

## 2021-04-27 RX ADMIN — INSULIN LISPRO 1 UNITS: 100 INJECTION, SOLUTION INTRAVENOUS; SUBCUTANEOUS at 17:05

## 2021-04-27 RX ADMIN — ERTAPENEM SODIUM 1000 MG: 1 INJECTION, POWDER, LYOPHILIZED, FOR SOLUTION INTRAMUSCULAR; INTRAVENOUS at 09:23

## 2021-04-27 RX ADMIN — LIDOCAINE HYDROCHLORIDE 50 MG: 10 INJECTION, SOLUTION EPIDURAL; INFILTRATION; INTRACAUDAL; PERINEURAL at 13:32

## 2021-04-27 RX ADMIN — QUETIAPINE FUMARATE 12.5 MG: 25 TABLET ORAL at 09:17

## 2021-04-27 RX ADMIN — PHENYLEPHRINE HYDROCHLORIDE 100 MCG: 10 INJECTION INTRAVENOUS at 13:36

## 2021-04-27 RX ADMIN — METOPROLOL SUCCINATE 75 MG: 50 TABLET, EXTENDED RELEASE ORAL at 09:18

## 2021-04-27 RX ADMIN — LISINOPRIL 40 MG: 20 TABLET ORAL at 09:18

## 2021-04-27 RX ADMIN — INSULIN LISPRO 2 UNITS: 100 INJECTION, SOLUTION INTRAVENOUS; SUBCUTANEOUS at 21:13

## 2021-04-27 RX ADMIN — DOCUSATE SODIUM AND SENNOSIDES 1 TABLET: 8.6; 5 TABLET ORAL at 21:12

## 2021-04-27 RX ADMIN — EPHEDRINE SULFATE 20 MG: 50 INJECTION, SOLUTION INTRAVENOUS at 13:47

## 2021-04-27 RX ADMIN — GABAPENTIN 100 MG: 100 CAPSULE ORAL at 09:18

## 2021-04-27 RX ADMIN — MELATONIN TAB 3 MG 3 MG: 3 TAB at 21:11

## 2021-04-27 RX ADMIN — EPHEDRINE SULFATE 5 MG: 50 INJECTION, SOLUTION INTRAVENOUS at 13:35

## 2021-04-27 RX ADMIN — DILTIAZEM HYDROCHLORIDE 180 MG: 60 TABLET, FILM COATED ORAL at 17:01

## 2021-04-27 RX ADMIN — SODIUM CHLORIDE, SODIUM LACTATE, POTASSIUM CHLORIDE, AND CALCIUM CHLORIDE 125 ML/HR: .6; .31; .03; .02 INJECTION, SOLUTION INTRAVENOUS at 00:48

## 2021-04-27 RX ADMIN — PHENYLEPHRINE HYDROCHLORIDE 200 MCG: 10 INJECTION INTRAVENOUS at 13:38

## 2021-04-27 RX ADMIN — PHENYLEPHRINE HYDROCHLORIDE 10 MCG/MIN: 10 INJECTION INTRAVENOUS at 13:40

## 2021-04-27 RX ADMIN — QUETIAPINE FUMARATE 25 MG: 25 TABLET ORAL at 21:12

## 2021-04-27 RX ADMIN — ASPIRIN 325 MG ORAL TABLET 325 MG: 325 PILL ORAL at 09:17

## 2021-04-27 RX ADMIN — DOCUSATE SODIUM 100 MG: 100 CAPSULE, LIQUID FILLED ORAL at 17:01

## 2021-04-27 RX ADMIN — PHENYLEPHRINE HYDROCHLORIDE 100 MCG: 10 INJECTION INTRAVENOUS at 13:34

## 2021-04-27 RX ADMIN — INSULIN HUMAN 3 UNITS: 100 INJECTION, SOLUTION PARENTERAL at 13:29

## 2021-04-27 RX ADMIN — PHENYLEPHRINE HYDROCHLORIDE 200 MCG: 10 INJECTION INTRAVENOUS at 13:32

## 2021-04-27 RX ADMIN — DILTIAZEM HYDROCHLORIDE 180 MG: 60 TABLET, FILM COATED ORAL at 09:17

## 2021-04-27 NOTE — UTILIZATION REVIEW
Continued Stay Review    Date: 4/27/21 Tuesday                       Current Patient Class: Inpatient  Current Level of Care: Acute Med Surg    HPI:    73year old male with DM2, DM neuropathy, without long-term current use of insulin, diabetic ulcer of right midfoot with fat layer exposed s/p foot fusion and reconstruction with external fixator on 8/21/32 - complicated by post op infection requiring hematoma evacuation, fasciotomy for compartment syndrome and removal of the external fixator  TMA done om 2/22/21  Now with large secondary defect with exposure of bone  Admitted 4/7/21 for scheduled surgery - plastic surgery planned procedure  to salvage his trans met amp stump  *As patient wants to attempt limb salvage - plan for right foot excisional debridement , closure with free tissue transfer      4/7 OP Procedure:  DEBRIDEMENT RIGHT FOOT (Right)  Findings:  Fibrinous debris in both medial and lateral aspects of foot  Free flap reconstruction aborted due to atrial fibrillation with RVR and hypotension        4/12 OP Procedure: DEBRIDEMENT LOWER EXTREMITY ([address] OUT) (Right)  NEURECTOMY CUTANEOUS BRANCH SUPERFICIAL PERONEAL NERVE (RIGHT)   RECONSTRUCTION MICROSURGICAL W/ FREE ANTEROLATERAL THIGH FLAP (Right)  FLAP EVALUATION WITH ICG LYMPHANGIOGRAPHY      4 /22 Plastic Sx - POD # 9:  POD 9 from ALT reconstruction of R foot defect, subsequent takebacks on POD 0 and 1 for venous congestion     R foot free flap doing well but with some erythema at the medial aspect appreciated, and seropurulent drainage      4/24 Plastic Sx - POD # 11:  Assessment:  73year old male POD 11 from ALT reconstruction of R foot defect, subsequent takebacks on POD 0 and 1 for venous congestion     Flap viable     Plan:  - Continue Q4 hr flap, hamilton hugger  Ok to move to commode with leg elevated at all times     - Cont Eliquis, daily aspirin  - Cont regular diet  - Appreciate assistance of Gerontology, Cards, ID and IM  - Doxy BID through 5/24  Erythema much improved  - Daily xeroform replacement to foot  - Continue dangle per protocol:  Post Operative Dangle Protocol:      4/26 POD # 13 - Plastic Sx: Flap has dehisced at the most distal portion of the flap with exposed hardware  Deep disruption or dehiscence of operation wound  Clinically stable    VSS, A fib rate controlled      Plan:  Hold discharge for today as flap has dehisced  Plan to take to OR tomorrow for washout       4/27 I+D:  Impression/Recommendations:  1  Possible residual osteomyelitis of right foot   Patient is status post screw fixation right foot on 03/04, with operative bone scraping culture growing Enterobacter   Patient has been on p o  Doxycycline since and is tolerating it well  Jose Abdul, he had a persistent open foot wound   Patient is now status post flap closure of right foot   Unfortunately, patient now has wound dehiscent with exposed screw  Plan for I&D and removal of hardware today noted  Wound culture with MDR Enterobacter  *Patient will most likely need long-term IV antibiotic  Continue IV ertapenem  Follow-up on operative culture and findings  Patient will likely need long-term IV antibiotics postop       2  Open right foot wound   Patient is status post flap closure, requiring return to OR twice for revision due to congestion   Wound is dehiscing, with purulence previously   Culture purulence grew MDR Enterobacter   Dehiscence is worse today, with exposed screw  Plan for I&D and hardware removal in OR noted  Antibiotic plan as in above  Repeat I&D per Plastic surgery    Follow-up on operative findings      3  Status post screw fixation right foot on 3/04      4  Status post failed right foot reconstruction, requiring TMA   All hardware prior to TMA were removed   However, as in above, patient is status post screw fixation of right foot after TMA      Antibiotics:  Ertapenem # 2      Vital Signs:   04/27/21 1420  97 1 °F (36 2 °C)Abnormal   89 16  --  --  100 %  6 L/min  Simple mask   04/27/21 07:04:44  97 8 °F (36 6 °C)  73  18  149/84  106  99 %  --  --   04/26/21 23:24:03  97 4 °F (36 3 °C)Abnormal   60  20  96/57  70  98 %  --  --   04/26/21 2106  --  56  --  119/66  --  --  --  --   04/26/21 15:15:13  98 4 °F (36 9 °C)  65  20  119/66  84  99 %  --        04/25 0701   04/26 0700 04/26 0701   04/27 0700 04/27 0701   04/28 0700   P  O  660 900 0   I V  (mL/kg)  641 7 (6 5) 300 (3)   Total Intake(mL/kg) 660 (6 7) 1541 7 (15 6) 300 (3)   Urine (mL/kg/hr) 975 (0 4) 1800 (0 8) 525 (0 7)   Stool 0     Blood   10   Total Output 975 1800 535   Net -315 -258 3 -235   Unmeasured Stool Occurrence 2 x       Pertinent Labs/Diagnostic Results:   Results from last 7 days   Lab Units 04/25/21  1317   SARS-COV-2  Negative     Results from last 7 days   Lab Units 04/27/21  1425 04/27/21  1044 04/27/21  0631 04/26/21  2102 04/26/21  1705 04/26/21  1116 04/26/21  0614 04/25/21  2032 04/25/21  1646 04/25/21  1043 04/25/21  0627 04/24/21  2138   POC GLUCOSE mg/dl 162* 199* 174* 170* 185* 175* 119 169* 168* 207* 166* 154*     Results from last 7 days   Lab Units 04/22/21  0905   GRAM STAIN RESULT  2+ Polys*  2+ Gram negative rods*   WOUND CULTURE  2+ Growth of Enterobacter cloacae complex*  2+ Growth of      Diet Regular; House; Consistent Carbohydrate Diet Level 3 (6 carb servings/ 90 grams CHO/meal)     Dietary Nutritional Supplements Glucerna - Man TID with meals       Scheduled Medications:  aspirin, 325 mg, Oral, Daily  atorvastatin, 40 mg, Oral, Daily With Dinner  bacitracin, 1 large application, Topical, BID  diltiazem, 180 mg, Oral, BID  docusate sodium, 100 mg, Oral, BID  IV ertapenem, 1,000 mg, Intravenous, Q24H  gabapentin, 100 mg, Oral, Daily  insulin glargine, 15 Units, Subcutaneous, QAM  insulin lispro, 1-6 Units, Subcutaneous, 4x Daily (AC & HS)  insulin lispro, 7 Units, Subcutaneous, TID With Meals  lisinopril, 40 mg, Oral, Daily  melatonin, 3 mg, Oral, HS  metoprolol succinate, 75 mg, Oral, BID  polyethylene glycol, 17 g, Oral, Daily  QUEtiapine, 12 5 mg, Oral, QAM  QUEtiapine, 25 mg, Oral, HS  senna-docusate sodium, 1 tablet, Oral, HS    Continuous IV Infusions:  IVF lactated ringers, 125 mL/hr, Intravenous, Continuous    PRN Meds:  acetaminophen, 650 mg, Oral, Q4H PRN  diazepam, 5 mg, Oral, Q6H PRN  HYDROmorphone, 0 5 mg, Intravenous, Q2H PRN  ondansetron, 4 mg, Intravenous, Q6H PRN  oxyCODONE, 10 mg, Oral, Q4H PRN  oxyCODONE, 5 mg, Oral, Q4H PRN    Discharge Plan: To be determined   Inpatient Case Management following for all discharge needs    Network Utilization Review Department  ATTENTION: Please call with any questions or concerns to [phone] and carefully listen to the prompts so that you are directed to the right person  All voicemails are confidential   Jeferson Mendoza all requests for admission clinical reviews, approved or denied determinations and any other requests to dedicated fax number below belonging to the campus where the patient is receiving treatment   List of dedicated fax numbers for the Facilities:  [address] DENIALS (Administrative/Medical Necessity) [phone]   [address] (Maternity/NICU/Pediatrics) [phone]   [address] Rowdy Rene 07[address] [id number] [place] [address] [address] [facility] [phone]

## 2021-04-27 NOTE — INTERIM OP NOTE
DEBRIDEMENT LOWER EXTREMITY ([address] OUT): RIGHT FOOT, REMOVAL FOREIGN BODY EXTREMITY: RIGHT FOOT, COMPLEX WOUND CLOSURE TO EXTREMITY: RIGHT FOOT  Postoperative Note  PATIENT NAME: Cassia Mckeon  : 1950  MRN: [id number]  BE OR ROOM 09    Surgery Date: 2021    Preop Diagnosis:  S/P transmetatarsal amputation of foot, right ([facility] [address] ) [Z89 431]    Post-Op Diagnosis Codes:     * S/P transmetatarsal amputation of foot, right ([facility]) [Z89 431]    Procedure(s) (LRB):  DEBRIDEMENT LOWER EXTREMITY ([address] OUT): RIGHT FOOT (Right)  REMOVAL FOREIGN BODY EXTREMITY: RIGHT FOOT (Right)  COMPLEX WOUND CLOSURE TO EXTREMITY: RIGHT FOOT    Surgeon(s) and Role:     * Lorrie Marrero MD - Primary     * Marquis Kenney DPM - Assisting    Specimens:  ID Type Source Tests Collected by Time Destination   A : Right foot  Tissue Foot, Right ANAEROBIC CULTURE AND GRAM STAIN, CULTURE, TISSUE AND GRAM STAIN Lorrie Marrero MD 2021 1345    B : Right foot bone culture swab Tissue Foot, Right ANAEROBIC CULTURE AND GRAM STAIN, CULTURE, TISSUE AND [address] [facility] [place] Sara Molina MD 2021 1348        Estimated Blood Loss:   Minimal    Anesthesia Type:   IV Sedation with Anesthesia     Findings: Seropurulent soft tissue within the wound base  Exposed 1st metatarsal with purulent/necrotic medullary bone extending proximally within the canal where midfoot screw was removed      Complications:   None    SIGNATURE: Marquis Kenney DPM   DATE: 2021   TIME: 2:24 PM

## 2021-04-27 NOTE — ANESTHESIA POSTPROCEDURE EVALUATION
Post-Op Assessment Note    CV Status:  Stable  Pain Score: 0    Pain management: adequate     Mental Status:  Alert and awake   Hydration Status:  Euvolemic   PONV Controlled:  Controlled   Airway Patency:  Patent      Post Op Vitals Reviewed: Yes      Staff: Anesthesiologist, CRNA         No complications documented      /62 (04/27/21 1424)    Temp (!) 97 1 °F (36 2 °C) (04/27/21 1424)    Pulse 72 (04/27/21 1424)   Resp 15 (04/27/21 1424)    SpO2 100 % (04/27/21 1424)

## 2021-04-27 NOTE — OCCUPATIONAL THERAPY NOTE
Occupational Therapy Cancellation Note       04/27/21 1137   Note Type   Note Type Treatment   Cancel Reasons Patient to operating room       Orders received and chart reviewed  Per plastic sx note, Pt planned for "OR today for washout, removal of metatarsal hardware " Will continue to follow to be seen for OT treatment post-op as appropriate/when medically cleared         August Dahl MS, OTR/L

## 2021-04-27 NOTE — ANESTHESIA PREPROCEDURE EVALUATION
Procedure:  DEBRIDEMENT LOWER EXTREMITY [facility] OUT) (Right Leg Lower)    Relevant Problems   CARDIO   (+) Atrial fibrillation with RVR (HCC)   (+) Essential hypertension   (+) Hyperlipidemia      ENDO   (+) Type 2 diabetes mellitus with diabetic neuropathy (HCC)      /RENAL   (+) Benign prostatic hyperplasia with lower urinary tract symptoms      NEURO/PSYCH   (+) Type 2 diabetes mellitus with diabetic neuropathy (HCC)      RLE debridement for dehisced wound    On ertapenem 1 gram IV q24    TTE 10/2018:  SUMMARY     LEFT VENTRICLE:  Systolic function was normal  Ejection fraction was estimated to be 55 %  There were no regional wall motion abnormalities  Wall thickness was mildly increased      LEFT ATRIUM:  The atrium was mildly dilated      RIGHT ATRIUM:  The atrium was mildly dilated      IVC, HEPATIC VEINS:  The inferior vena cava was mildly dilated  Physical Exam    Airway    Mallampati score: I  TM Distance: >3 FB  Neck ROM: full     Dental   Comment: Multiple missing molars, decayed molars,     Cardiovascular      Pulmonary      Other Findings        Anesthesia Plan  ASA Score- 3     Anesthesia Type- IV sedation with anesthesia with ASA Monitors  Additional Monitors:   Airway Plan:     Comment: Possible GETA as backup plan  Plan Factors-Exercise tolerance (METS): <4 METS  Chart reviewed  EKG reviewed  Imaging results reviewed  Existing labs reviewed  Patient summary reviewed  Induction- intravenous  Postoperative Plan-     Informed Consent- Anesthetic plan and risks discussed with patient  I personally reviewed this patient with the CRNA  Discussed and agreed on the Anesthesia Plan with the CRNA  Sherif Herrera

## 2021-04-27 NOTE — PROGRESS NOTES
Progress Note - Infectious Disease   Eryn Urbina  73 y o  male MRN: [id number]  Unit/Bed#: OR [place] Encounter: [id number]      Impression/Recommendations:  1  Possible residual osteomyelitis of right foot   Patient is status post screw fixation right foot on 03/04, with operative bone scraping culture growing Enterobacter   Patient has been on p o  Doxycycline since and is tolerating it well  [place], he had a persistent open foot wound   Patient is now status post flap closure of right foot   Unfortunately, patient now has wound dehiscent with exposed screw  Plan for I&D and removal of hardware today noted  Wound culture with MDR Enterobacter  Patient will most likely need long-term IV antibiotic  Continue IV ertapenem  Follow-up on operative culture and findings  Patient will likely need long-term IV antibiotics postop       2  Open right foot wound   Patient is status post flap closure, requiring return to OR twice for revision due to congestion   Wound is dehiscing, with purulence previously  Culture purulence grew MDR Enterobacter  Dehiscence is worse today, with exposed screw  Plan for I&D and hardware removal in OR noted  Antibiotic plan as in above  Repeat I&D per Plastic surgery  Follow-up on operative findings      3  Status post screw fixation right foot on 03/04      4  Status post failed right foot reconstruction, requiring TMA   All hardware prior to TMA were removed   However, as in above, patient is status post screw fixation of right foot after TMA      5  DM, somewhat poorly controlled, with elevated hemoglobin A1c  Management per Medicine Service      Discussed with patient in detail regarding the above plan       Antibiotics:  Ertapenem # 2      Subjective:  Patient is comfortable   Confusion mild and stable  No pain in right foot  Temperature stays down   No chills    He is tolerating antibiotic well   No nausea, vomiting or diarrhea      Objective:  Vitals:  Temp:  [97 4 °F (36 3 °C)-98 4 °F (36 9 °C)] 97 8 °F (36 6 °C)  HR:  [56-73] 73  Resp:  [18-20] 18  BP: ()/(57-84) 149/84  SpO2:  [98 %-99 %] 99 %  Temp (24hrs), Av 9 °F (36 6 °C), Min:97 4 °F (36 3 °C), Max:98 4 °F (36 9 °C)  Current: Temperature: 97 8 °F (36 6 °C)    Physical Exam:     General: Awake, alert, cooperative, no distress  Neck:  Supple  No mass  No lymphadenopathy  Lungs: Expansion symmetric, no rales, no wheezing, respirations unlabored  Heart:  Regular rate and rhythm, S1 and S2 normal, no murmur  Abdomen: Soft, nondistended, non-tender, bowel sounds active all four quadrants, no masses, no organomegaly  Extremities: Stable right foot edema  Stable dehiscence in medial incision  No purulence  No erythema/warmth  Nontender  Skin:  No rash  Neuro: Moves all extremities  Invasive Devices     Peripheral Intravenous Line            Peripheral IV 21 Distal;Dorsal (posterior); Left Forearm 1 day    Peripheral IV 21 Left;Ventral (anterior) Forearm less than 1 day                Labs studies:   I have personally reviewed pertinent labs  Invalid input(s): ALBUMIN      Results from last 7 days   Lab Units 21  0905   GRAM STAIN RESULT  2+ Polys*  2+ Gram negative rods*   WOUND CULTURE  2+ Growth of Enterobacter cloacae complex*  2+ Growth of        Imaging Studies:   I have personally reviewed pertinent imaging study reports and images in PACS  EKG, Pathology, and Other Studies:   I have personally reviewed pertinent reports

## 2021-04-27 NOTE — PROGRESS NOTES
Progress Note - Plastic Surgery   Wai Robles  73 y o  male MRN: [id number]  Unit/Bed#: [facility] 525[address] Encounter: [id number]    Assessment:  Principal Problem:    S/P transmetatarsal amputation of foot, right (HCC)  Active Problems:    Atrial fibrillation with RVR ([facility] [address] )    Hyperlipidemia    Type 2 diabetes mellitus with diabetic neuropathy ([facility] )    Essential hypertension    Acquired absence of other right toe(s) ([facility] [address] )    Deep disruption or dehiscence of operation wound    Ambulatory dysfunction    Diabetic ulcer of right 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, with fat layer exposed ([facility] )    Sleep disturbance    73 y o  male POD 15 from ALT reconstruction of R foot defect, subsequent takebacks on POD 0 and 1 for venous congestion  Overall doing well  Flap has dehisced at the most distal portion of the flap with exposed hardware  Clinically stable    VSS, A fib rate controlled     Plan:  - OR today for washout, removal of metatarsal hardware  - Cont Eliquis (holding for OR), daily aspirin  - Cont regular diet, off IVF  - Appreciate assistance of Gerontology, Cards, ID and IM  - Abx per ID, ertapemen  - Daily xeroform replacement to foot  - Continue dangle per protocol:  Post Operative Dangle Protocol:    Please "dangle" the patient's RLE for the dates and times listed below  OK to have the heel rest on a pillow/chux pad  Immediately elevate leg for any darker/purple discoloration of flap    4/22: 5 min 2x a day  4/23: 5 min 3x a day  4/24: 10 min 2x a day  4/25: 10 min 3x a day  4/26, 4/27: 15 min 2x a day  4/28, 4/29: 15 min 3x a day  4/30, 5/1: 20 min 2x a day  5/2, 5/3: 20 min 3x a day  5/4: 30 min once  5/5, 5/6: 30 min 2x a day  5/7, 5/8: 30 min 3x day  5/9: 45 min once  5/10, 5/11: 45 min 2x a day  5/12 and until cleared by Dr Kirstin Pacheco for longer: 45 min 3x a day    Again, immediately elevate leg for any darker/purple discoloration of flap      - Discussed with Dr Divya Thornton: skilled facility when discharged, anticipate tomorrow    Elia Espinoza MD  Resident, Plastic & Reconstructive Surgery  [phone] (c)     Subjective/Objective   Chief Complaint: R foot wound    Subjective: resting in bed  Pain controlled  Objective:     Blood pressure 149/84, pulse 73, temperature 97 8 °F (36 6 °C), temperature source Oral, resp  rate 18, height 6' 5" (1 956 m), weight 99 1 kg (218 lb 7 6 oz), SpO2 99 %  ,Body mass index is 25 91 kg/m²  Intake/Output Summary (Last 24 hours) at 4/27/2021 0747  Last data filed at 4/27/2021 0556  Gross per 24 hour   Intake 1541 67 ml   Output 1800 ml   Net -258 33 ml       Invasive Devices     Peripheral Intravenous Line            Peripheral IV 04/26/21 Distal;Dorsal (posterior); Left Forearm less than 1 day                Physical Exam:   Gen: NAD  Neuro: Alert, oriented  CV: regular rate  Pulm: no respiratory distress  Abd: nondistended  GI: condom catheter  Ext: RLE free flap is pink, soft, warm  Cap refill is about 2 seconds  Dehiscence at the distal and medial aspects of flap with purulent drainage         Lab, Imaging and other studies:I have personally reviewed pertinent lab results      VTE Pharmacologic Prophylaxis: Sequential compression device (Venodyne), Eliquis (on hold for OR)  VTE Mechanical Prophylaxis: sequential compression device

## 2021-04-27 NOTE — PHYSICAL THERAPY NOTE
Physical Therapy Cancellation Note  The pt  Is off of the floor at the OR  Will continue to follow      Sharif León PTA

## 2021-04-27 NOTE — PROGRESS NOTES
Progress Note - Geriatric Medicine   Shara Ward  73 y o  male MRN: [id number]  Unit/Bed#: [facility] 525-01 Encounter: [id number]      Assessment/Plan:    Acute metabolic encephalopathy  -resolved, remains back at baseline mentation  -patient/wife were anxious for possible return of symptoms following surgical procedure today however patient is doing extremely well and has no evidence of encephalopathy or delirium at time of evaluation  -continue current Seroquel regimen 12 5 mg BID, monitor QTc with use and recommend attempted taper as outpatient, deferred during inpatient stay given risk of recurrence higher during hospitalization than as o/p, not intended to be used long term and encourage dose reduction in tapering attempts as outpatient  -continue delirium precautions and supportive cares    Possible residual osteomyelitis right foot  -currently on ertapenem as managed by ID  -per ID anticipate long-term abx regimen dependent on final cultures    DM-II  -currently on correctional insulin with good control, maintained in target range    Ambulatory dysfunction  -multifactorial including impaired body mechanics due to recent right transmetatarsal amputation   -continue to encourage good body mechanics assist with all transfers  -strict adherence to weight-bearing status as outlined by Plastic surgery  -PT and OT following    Impaired hearing  -continue encourage use of hearing aids at all appropriate times  -speak loudly and clearly allowing time for response  -encourage use of teach back method to ensure clear communication    Impaired vision  -continue appropriate lighting and consideration of large print font for any printed materials provided to patient    Deconditioning/debility/frailty  -continue optimization nutritional intake and good control chronic medical co-morbidities  -psychosocial support of patient and caretaker    Care coordination:  Rounded with Jaycee Sales (RAVEN)    Subjective:     Patient seen examined at bedside where he is lying resting comfortably, he underwent washout and metatarsal hold for removal today with Plastic Surgery and was anxious preprocedure for risk of encephalopathy which he had earlier in admission for which he was started on in continues on low-dose Seroquel  He reports everything went well with his procedure earlier in the day and he feels great, he denies confusion, anxiety, restlessness of the time of evaluation  He tells me his appetite continues to improve and he is looking forward to the next phase of his recovery  Review of Systems   Constitutional: Positive for appetite change (Continues to improve)  Negative for chills and fever  HENT: Negative  Eyes: Negative  Respiratory: Negative  Cardiovascular: Negative  Gastrointestinal: Negative  Endocrine: Negative  Genitourinary: Negative  Musculoskeletal: Positive for gait problem  Negative for back pain  Allergic/Immunologic: Negative  Neurological: Negative for dizziness and light-headedness  Hematological: Negative  Psychiatric/Behavioral: Negative for sleep disturbance  All other systems reviewed and are negative  Objective:     Vitals: Blood pressure 104/55, pulse 64, temperature 97 6 °F (36 4 °C), resp  rate (!) 11, height 6' 5" (1 956 m), weight 99 1 kg (218 lb 7 6 oz), SpO2 99 %  ,Body mass index is 25 91 kg/m²  Intake/Output Summary (Last 24 hours) at 4/27/2021 1817  Last data filed at 4/27/2021 1601  Gross per 24 hour   Intake 1601 67 ml   Output 1985 ml   Net -383 33 ml     Current Medications: Reviewed    Physical Exam:   Physical Exam  Vitals signs and nursing note reviewed  Constitutional:       Comments: Sitting in bed resting comfortably watching television, no acute distress   HENT:      Head: Normocephalic and atraumatic        Comments: Right hearing aid in place     Nose: Nose normal       Mouth/Throat:      Mouth: Mucous membranes are moist       Comments: Dentition intact  Eyes:      General:         Right eye: No discharge  Left eye: No discharge  Conjunctiva/sclera: Conjunctivae normal    Neck:      Musculoskeletal: Neck supple  Comments: Trachea midline, phonation normal  Cardiovascular:      Rate and Rhythm: Normal rate  Pulses: Normal pulses  Pulmonary:      Effort: Pulmonary effort is normal  No respiratory distress  Abdominal:      Palpations: Abdomen is soft  Tenderness: There is no abdominal tenderness  Musculoskeletal:      Comments: Mildly reduced overall muscle mass  Moves 4 extremities spontaneously   Skin:     General: Skin is warm and dry  Comments: Clean dry surgical dressings right lower extremity   Neurological:      Mental Status: He is alert  Comments: Awake and alert, answers questions appropriately, no evidence of delirium/encephalopathy   Psychiatric:         Mood and Affect: Mood normal          Behavior: Behavior normal       Comments: Pleasant and cooperative       Invasive Devices     Peripheral Intravenous Line            Peripheral IV 04/26/21 Distal;Dorsal (posterior); Left Forearm 1 day    Peripheral IV 04/27/21 Left;Ventral (anterior) Forearm less than 1 day              Lab, Imaging and other studies: I have personally reviewed pertinent reports

## 2021-04-28 ENCOUNTER — APPOINTMENT (INPATIENT)
Dept: RADIOLOGY | Facility: HOSPITAL | Age: 71
DRG: 463 | End: 2021-04-28
Payer: COMMERCIAL

## 2021-04-28 LAB
GLUCOSE SERPL-MCNC: 144 MG/DL (ref 65–140)
GLUCOSE SERPL-MCNC: 198 MG/DL (ref 65–140)
GLUCOSE SERPL-MCNC: 199 MG/DL (ref 65–140)
GLUCOSE SERPL-MCNC: 203 MG/DL (ref 65–140)

## 2021-04-28 PROCEDURE — 82948 REAGENT STRIP/BLOOD GLUCOSE: CPT

## 2021-04-28 PROCEDURE — C1751 CATH, INF, PER/CENT/MIDLINE: HCPCS

## 2021-04-28 PROCEDURE — 99233 SBSQ HOSP IP/OBS HIGH 50: CPT | Performed by: INTERNAL MEDICINE

## 2021-04-28 PROCEDURE — 36569 INSJ PICC 5 YR+ W/O IMAGING: CPT

## 2021-04-28 PROCEDURE — 71045 X-RAY EXAM CHEST 1 VIEW: CPT

## 2021-04-28 PROCEDURE — NC001 PR NO CHARGE: Performed by: PLASTIC SURGERY

## 2021-04-28 RX ADMIN — ATORVASTATIN CALCIUM 40 MG: 40 TABLET, FILM COATED ORAL at 17:22

## 2021-04-28 RX ADMIN — ERTAPENEM SODIUM 1000 MG: 1 INJECTION, POWDER, LYOPHILIZED, FOR SOLUTION INTRAMUSCULAR; INTRAVENOUS at 11:40

## 2021-04-28 RX ADMIN — DOCUSATE SODIUM AND SENNOSIDES 1 TABLET: 8.6; 5 TABLET ORAL at 21:29

## 2021-04-28 RX ADMIN — APIXABAN 5 MG: 5 TABLET, FILM COATED ORAL at 17:22

## 2021-04-28 RX ADMIN — LISINOPRIL 40 MG: 20 TABLET ORAL at 08:46

## 2021-04-28 RX ADMIN — DOCUSATE SODIUM 100 MG: 100 CAPSULE, LIQUID FILLED ORAL at 17:22

## 2021-04-28 RX ADMIN — GABAPENTIN 100 MG: 100 CAPSULE ORAL at 08:46

## 2021-04-28 RX ADMIN — POLYETHYLENE GLYCOL 3350 17 G: 17 POWDER, FOR SOLUTION ORAL at 08:44

## 2021-04-28 RX ADMIN — METOPROLOL SUCCINATE 75 MG: 50 TABLET, EXTENDED RELEASE ORAL at 08:45

## 2021-04-28 RX ADMIN — INSULIN LISPRO 2 UNITS: 100 INJECTION, SOLUTION INTRAVENOUS; SUBCUTANEOUS at 17:32

## 2021-04-28 RX ADMIN — INSULIN LISPRO 2 UNITS: 100 INJECTION, SOLUTION INTRAVENOUS; SUBCUTANEOUS at 11:40

## 2021-04-28 RX ADMIN — QUETIAPINE FUMARATE 12.5 MG: 25 TABLET ORAL at 08:45

## 2021-04-28 RX ADMIN — DOCUSATE SODIUM 100 MG: 100 CAPSULE, LIQUID FILLED ORAL at 08:45

## 2021-04-28 RX ADMIN — DILTIAZEM HYDROCHLORIDE 180 MG: 60 TABLET, FILM COATED ORAL at 17:22

## 2021-04-28 RX ADMIN — QUETIAPINE FUMARATE 25 MG: 25 TABLET ORAL at 21:30

## 2021-04-28 RX ADMIN — BACITRACIN 1 LARGE APPLICATION: 500 OINTMENT TOPICAL at 08:56

## 2021-04-28 RX ADMIN — MELATONIN TAB 3 MG 3 MG: 3 TAB at 21:29

## 2021-04-28 RX ADMIN — DILTIAZEM HYDROCHLORIDE 180 MG: 60 TABLET, FILM COATED ORAL at 08:46

## 2021-04-28 RX ADMIN — INSULIN LISPRO 2 UNITS: 100 INJECTION, SOLUTION INTRAVENOUS; SUBCUTANEOUS at 21:30

## 2021-04-28 RX ADMIN — METOPROLOL SUCCINATE 75 MG: 50 TABLET, EXTENDED RELEASE ORAL at 21:30

## 2021-04-28 RX ADMIN — APIXABAN 5 MG: 5 TABLET, FILM COATED ORAL at 08:46

## 2021-04-28 RX ADMIN — ASPIRIN 325 MG ORAL TABLET 325 MG: 325 PILL ORAL at 08:46

## 2021-04-28 RX ADMIN — INSULIN GLARGINE 15 UNITS: 100 INJECTION, SOLUTION SUBCUTANEOUS at 08:56

## 2021-04-28 RX ADMIN — SODIUM CHLORIDE, SODIUM LACTATE, POTASSIUM CHLORIDE, AND CALCIUM CHLORIDE 125 ML/HR: .6; .31; .03; .02 INJECTION, SOLUTION INTRAVENOUS at 00:05

## 2021-04-28 NOTE — PROCEDURES
Insert PICC line    Date/Time: 4/28/2021 2:59 PM  Performed by: Aide Reynoso RN  Authorized by: Daphne Hall MD     Patient location:  Bedside  Other Assisting Provider: Yes (comment) (Sendy Torres Infusion Tech)    Consent:     Consent obtained:  Written (Physician obtained)    Consent given by:  Patient    Procedural risks discussed: with MD   [place] protocol:     Procedure explained and questions answered to patient or proxy's satisfaction: yes      Relevant documents present and verified: yes      Test results available and properly labeled: yes      Radiology Images displayed and confirmed  If images not available, report reviewed: yes      Required blood products, implants, devices, and special equipment available: yes      Site/side marked: yes      Immediately prior to procedure, a time out was called: yes      Patient identity confirmed:  Verbally with patient  Pre-procedure details:     Hand hygiene: Hand hygiene performed prior to insertion      Sterile barrier technique: All elements of maximal sterile technique followed      Skin preparation:  ChloraPrep    Skin preparation agent: Skin preparation agent completely dried prior to procedure    Indications:     PICC line indications: long term antibiotics    Anesthesia (see MAR for exact dosages):      Anesthesia method:  Local infiltration    Local anesthetic:  Lidocaine 2% w/o epi (2 ml)  Procedure details:     Location:  [place]    Vessel type: vein      Laterality:  Right    Approach: percutaneous technique used      Patient position:  Flat    Procedural supplies:  Double lumen    Catheter size:  5 Fr    Landmarks identified: yes      Ultrasound guidance: yes      Ultrasound image availability:  Not saved    Sterile ultrasound techniques: Sterile gel and sterile probe covers were used      Number of attempts:  1    Successful placement: yes      Vessel of catheter tip end:  Chest Xray needed to confirm placement    Total catheter length (cm): 50    Catheter out on skin (cm):  0    Max flow rate:  999    Arm circumference:  29  Post-procedure details:     Post-procedure:  Dressing applied and securement device placed    Assessment:  Blood return through all ports, free fluid flow and placement verification pending x-ray result    Post-procedure complications: none      Patient tolerance of procedure:   Tolerated well, no immediate complications  Comments:      Lot #[id number] Exp 2022-06-30

## 2021-04-28 NOTE — CASE MANAGEMENT
Updated plastic surgery notes sent to [facility] for them to review and confirm they can accept pt

## 2021-04-28 NOTE — PROGRESS NOTES
Progress Note - Plastic Surgery   Nano Rush  73 y o  male MRN: [id number]  Unit/Bed#: [facility] 525-01 Encounter: [id number]    Assessment:  Principal Problem:    S/P transmetatarsal amputation of foot, right (HCC)  Active Problems:    Atrial fibrillation with RVR ([facility] [address] )    Hyperlipidemia    Type 2 diabetes mellitus with diabetic neuropathy ([facility] )    Essential hypertension    Acquired absence of other right toe(s) ([facility] )    Deep disruption or dehiscence of operation wound    Ambulatory dysfunction    Diabetic ulcer of right 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, with fat layer exposed ([facility]  )    Sleep disturbance    73 y o  male POD 16 from ALT reconstruction of R foot defect, subsequent takebacks on POD 0 and 1 for venous congestion  S/p hardware removal and irrigation and debridement on 4/27    Overall doing well  Clinically stable    VSS, A fib rate controlled     Plan:  - Local wound care with BID packing, abx per ID  - Restart Eliquis today, daily aspirin  - Cont regular diet, off IVF  - Appreciate assistance of Gerontology, Cards, ID and IM  - Abx per ID, ertapemen  - Daily xeroform replacement to foot  - Continue dangle per protocol:  Post Operative Dangle Protocol:    Please "dangle" the patient's RLE for the dates and times listed below  OK to have the heel rest on a pillow/chux pad  Immediately elevate leg for any darker/purple discoloration of flap    4/22: 5 min 2x a day  4/23: 5 min 3x a day  4/24: 10 min 2x a day  4/25: 10 min 3x a day  4/26, 4/27: 15 min 2x a day  4/28, 4/29: 15 min 3x a day  4/30, 5/1: 20 min 2x a day  5/2, 5/3: 20 min 3x a day  5/4: 30 min once  5/5, 5/6: 30 min 2x a day  5/7, 5/8: 30 min 3x day  5/9: 45 min once  5/10, 5/11: 45 min 2x a day  5/12 and until cleared by Dr Rito Vernon for longer: 45 min 3x a day    Again, immediately elevate leg for any darker/purple discoloration of flap      - Discussed with Dr Capone Points: skilled facility when discharged, anticipate tomorrow    Patrick Gillette MD  Resident, Plastic & Reconstructive Surgery  [phone] (c)     Subjective/Objective   Chief Complaint: R foot wound    Subjective: resting in bed  Pain controlled  Objective:     Blood pressure 103/56, pulse (!) 52, temperature 97 5 °F (36 4 °C), resp  rate 20, height 6' 5" (1 956 m), weight 99 1 kg (218 lb 7 6 oz), SpO2 97 %  ,Body mass index is 25 91 kg/m²  Intake/Output Summary (Last 24 hours) at 4/28/2021 0638  Last data filed at 4/28/2021 0005  Gross per 24 hour   Intake 1512 5 ml   Output 985 ml   Net 527 5 ml       Invasive Devices     Peripheral Intravenous Line            Peripheral IV 04/26/21 Distal;Dorsal (posterior); Left Forearm 1 day    Peripheral IV 04/27/21 Left;Ventral (anterior) Forearm less than 1 day                Physical Exam:   Gen: NAD  Neuro: Alert, oriented  CV: regular rate  Pulm: no respiratory distress  Abd: nondistended  GI: condom catheter  Ext: RLE free flap is pink, soft, warm  Cap refill is about 2 seconds  Dehiscence at the distal and medial aspects of flap packing replaced       Lab, Imaging and other studies:I have personally reviewed pertinent lab results      VTE Pharmacologic Prophylaxis: Sequential compression device (Venodyne), Eliquis (on hold for OR)  VTE Mechanical Prophylaxis: sequential compression device

## 2021-04-28 NOTE — PROGRESS NOTES
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   Patient is status post screw fixation right foot on 03/04, with operative bone scraping culture growing Enterobacter   Patient has been on p o  Doxycycline since  Patient is status post flap closure with wound dehiscence and exposed hardware  He is now status post I&D and removal of the last screw, with operative findings of osteomyelitis and deep infection  Wound culture with MDR Enterobacter  Operative culture pending  Patient will need long-term IV antibiotic  Continue IV ertapenem  Follow-up on operative culture  Treat x 4-6 weeks postop       2  Open right foot wound   Patient is status post flap closure, requiring return to OR twice for revision due to congestion   Wound has dehisced, most likely secondary to underlying deep infection  Patient is status post I&D and wound closure  Remaining hardware was also removed  Antibiotic plan as in above  Wound care per Podiatry and Plastic surgery      3  Status post screw fixation right foot on 03/04  Screw was removed 4/27      4  Status post failed right foot reconstruction, requiring TMA   All hardware prior to TMA were removed   However, as in above, patient is status post screw fixation of right foot after TMA  Screw was removed      5  DM, somewhat poorly controlled, with elevated hemoglobin A1c  Management per Medicine Service      Discussed with patient in detail regarding the above plan       Antibiotics:  Ertapenem # 3  POD # 1      Subjective:  Patient is comfortable   Confusion mild and stable  Minimal pain in right foot  Temperature stays down   No chills    He is tolerating antibiotic well   No nausea, vomiting or diarrhea      Objective:  Vitals:  Temp:  [97 1 °F (36 2 °C)-98 1 °F (36 7 °C)] 98 1 °F (36 7 °C)  HR:  [52-89] 82  Resp:  [11-20] 20  BP: (103-144)/(55-88) 144/88  SpO2:  [96 %-100 %] 98 %  Temp (24hrs), Av 5 °F (36 4 °C), Min:97 1 °F (36 2 °C), Max:98 1 °F (36 7 °C)  Current: Temperature: 98 1 °F (36 7 °C)    Physical Exam:     General: Awake, alert, cooperative, no distress  Neck:  Supple  No mass  No lymphadenopathy  Lungs: Expansion symmetric, no rales, no wheezing, respirations unlabored  Heart:  Regular rate and rhythm, S1 and S2 normal, no murmur  Abdomen: Soft, nondistended, non-tender, bowel sounds active all four quadrants, no masses, no organomegaly  Extremities: Stable right foot edema  Right foot wound with dressing in place  Mild serous staining  No erythema/warmth beyond dressing  Only mild tenderness  Skin:  No rash  Neuro: Moves all extremities  Invasive Devices     Peripheral Intravenous Line            Peripheral IV 21 Distal;Dorsal (posterior); Left Forearm 1 day    Peripheral IV 21 Left;Ventral (anterior) Forearm 1 day                Labs studies:   I have personally reviewed pertinent labs  Invalid input(s): ALBUMIN      Results from last 7 days   Lab Units 21  1348 21  1345 21  0905   GRAM STAIN RESULT  No Polys or Bacteria seen 1+ Gram negative rods*  1+ Gram positive cocci in pairs*  Rare Polys* 2+ Polys*  2+ Gram negative rods*   WOUND CULTURE   --   --  2+ Growth of Enterobacter cloacae complex*  2+ Growth of        Imaging Studies:   I have personally reviewed pertinent imaging study reports and images in PACS  EKG, Pathology, and Other Studies:   I have personally reviewed pertinent reports

## 2021-04-28 NOTE — PLAN OF CARE
Problem: Potential for Falls  Goal: Patient will remain free of falls  Description: INTERVENTIONS:  - Assess patient frequently for physical needs  -  Identify cognitive and physical deficits and behaviors that affect risk of falls    -  [place] fall precautions as indicated by assessment   - Educate patient/family on patient safety including physical limitations  - Instruct patient to call for assistance with activity based on assessment  - Modify environment to reduce risk of injury  - Consider OT/PT consult to assist with strengthening/mobility  Outcome: Progressing     Problem: PAIN - ADULT  Goal: Verbalizes/displays adequate comfort level or baseline comfort level  Description: Interventions:  - Encourage patient to monitor pain and request assistance  - Assess pain using appropriate pain scale  - Administer analgesics based on type and severity of pain and evaluate response  - Implement non-pharmacological measures as appropriate and evaluate response  - Consider cultural and social influences on pain and pain management  - Notify physician/advanced practitioner if interventions unsuccessful or patient reports new pain  Outcome: Progressing     Problem: INFECTION - ADULT  Goal: Absence or prevention of progression during hospitalization  Description: INTERVENTIONS:  - Assess and monitor for signs and symptoms of infection  - Monitor lab/diagnostic results  - Monitor all insertion sites, i e  indwelling lines, tubes, and drains  - Monitor endotracheal if appropriate and nasal secretions for changes in amount and color  - [place] appropriate cooling/warming therapies per order  - Administer medications as ordered  - Instruct and encourage patient and family to use good hand hygiene technique  - Identify and instruct in appropriate isolation precautions for identified infection/condition  Outcome: Progressing     Problem: SAFETY ADULT  Goal: Patient will remain free of falls  Description: INTERVENTIONS:  - Assess patient frequently for physical needs  -  Identify cognitive and physical deficits and behaviors that affect risk of falls    -  [place] fall precautions as indicated by assessment   - Educate patient/family on patient safety including physical limitations  - Instruct patient to call for assistance with activity based on assessment  - Modify environment to reduce risk of injury  - Consider OT/PT consult to assist with strengthening/mobility  Outcome: Progressing  Goal: Maintain or return to baseline ADL function  Description: INTERVENTIONS:  -  Assess patient's ability to carry out ADLs; assess patient's baseline for ADL function and identify physical deficits which impact ability to perform ADLs (bathing, care of mouth/teeth, toileting, grooming, dressing, etc )  - Assess/evaluate cause of self-care deficits   - Assess range of motion  - Assess patient's mobility; develop plan if impaired  - Assess patient's need for assistive devices and provide as appropriate  - Encourage maximum independence but intervene and supervise when necessary  - Involve family in performance of ADLs  - Assess for home care needs following discharge   - Consider OT consult to assist with ADL evaluation and planning for discharge  - Provide patient education as appropriate  Outcome: Progressing  Goal: Maintain or return mobility status to optimal level  Description: INTERVENTIONS:  - Assess patient's baseline mobility status (ambulation, transfers, stairs, etc )    - Identify cognitive and physical deficits and behaviors that affect mobility  - Identify mobility aids required to assist with transfers and/or ambulation (gait belt, sit-to-stand, lift, walker, cane, etc )  - [place] fall precautions as indicated by assessment  - Record patient progress and toleration of activity level on Mobility SBAR; progress patient to next Phase/Stage  - Instruct patient to call for assistance with activity based on assessment  - Consider rehabilitation consult to assist with strengthening/weightbearing, etc   Outcome: Progressing     Problem: DISCHARGE PLANNING  Goal: Discharge to home or other facility with appropriate resources  Description: INTERVENTIONS:  - Identify barriers to discharge w/patient and caregiver  - Arrange for needed discharge resources and transportation as appropriate  - Identify discharge learning needs (meds, wound care, etc )  - Arrange for interpretive services to assist at discharge as needed  - Refer to Case Management Department for coordinating discharge planning if the patient needs post-hospital services based on physician/advanced practitioner order or complex needs related to functional status, cognitive ability, or social support system  Outcome: Progressing     Problem: Knowledge Deficit  Goal: Patient/family/caregiver demonstrates understanding of disease process, treatment plan, medications, and discharge instructions  Description: Complete learning assessment and assess knowledge base  Interventions:  - Provide teaching at level of understanding  - Provide teaching via preferred learning methods  Outcome: Progressing     Problem: Nutrition/Hydration-ADULT  Goal: Nutrient/Hydration intake appropriate for improving, restoring or maintaining nutritional needs  Description: Monitor and assess patient's nutrition/hydration status for malnutrition  Collaborate with interdisciplinary team and initiate plan and interventions as ordered  Monitor patient's weight and dietary intake as ordered or per policy  Utilize nutrition screening tool and intervene as necessary  Determine patient's food preferences and provide high-protein, high-caloric foods as appropriate       INTERVENTIONS:  - Monitor oral intake, urinary output, labs, and treatment plans  - Assess nutrition and hydration status and recommend course of action  - Evaluate amount of meals eaten  - Assist patient with eating if necessary   - Allow adequate time for meals  - Recommend/ encourage appropriate diets, oral nutritional supplements, and vitamin/mineral supplements  - Order, calculate, and assess calorie counts as needed  - Recommend, monitor, and adjust tube feedings and TPN/PPN based on assessed needs  - Assess need for intravenous fluids  - Provide specific nutrition/hydration education as appropriate  - Include patient/family/caregiver in decisions related to nutrition  Outcome: Progressing     Problem: Prexisting or High Potential for Compromised Skin Integrity  Goal: Skin integrity is maintained or improved  Description: INTERVENTIONS:  - Identify patients at risk for skin breakdown  - Assess and monitor skin integrity  - Assess and monitor nutrition and hydration status  - Monitor labs   - Assess for incontinence   - Turn and reposition patient  - Assist with mobility/ambulation  - Relieve pressure over bony prominences  - Avoid friction and shearing  - Provide appropriate hygiene as needed including keeping skin clean and dry  - Evaluate need for skin moisturizer/barrier cream  - Collaborate with interdisciplinary team   - Patient/family teaching  - Consider wound care consult   Outcome: Progressing     Problem: CONFUSION/THOUGHT DISTURBANCE  Goal: Thought disturbances (confusion, delirium, depression, dementia or psychosis) are managed to maintain or return to baseline mental status and functional level  Description: INTERVENTIONS:  - Assess for possible contributors to  thought disturbance, including but not limited to medications, infection, impaired vision or hearing, underlying metabolic abnormalities, dehydration, respiratory compromise,  psychiatric diagnoses and notify attending PHYSICAN/AP  - Monitor and intervene to maintain adequate nutrition, hydration, elimination, sleep and activity  - Decrease environmental stimuli, including noise as appropriate  - Provide frequent contacts to provide refocusing, direction and reassurance as needed  Approach patient calmly with eye contact and at their level    - [place] high risk fall precautions, aspiration precautions and other safety measures, as indicated  - If delirium suspected, notify physician/AP of change in condition and request immediate in-person evaluation  - Pursue consults as appropriate including Geriatric (campus dependent), OT for cognitive evaluation/activity planning, psychiatric, pastoral care, etc   Outcome: Progressing     Problem: METABOLIC, FLUID AND ELECTROLYTES - ADULT  Goal: Electrolytes maintained within normal limits  Description: INTERVENTIONS:  - Monitor labs and assess patient for signs and symptoms of electrolyte imbalances  - Administer electrolyte replacement as ordered  - Monitor response to electrolyte replacements, including repeat lab results as appropriate  - Instruct patient on fluid and nutrition as appropriate  Outcome: Progressing  Goal: Glucose maintained within target range  Description: INTERVENTIONS:  - Monitor Blood Glucose as ordered  - Assess for signs and symptoms of hyperglycemia and hypoglycemia  - Administer ordered medications to maintain glucose within target range  - Assess nutritional intake and initiate nutrition service referral as needed  Outcome: Progressing     Problem: SKIN/TISSUE INTEGRITY - ADULT  Goal: Skin integrity remains intact  Description: INTERVENTIONS  - Identify patients at risk for skin breakdown  - Assess and monitor skin integrity  - Assess and monitor nutrition and hydration status  - Monitor labs (i e  albumin)  - Assess for incontinence   - Turn and reposition patient  - Assist with mobility/ambulation  - Relieve pressure over bony prominences  - Avoid friction and shearing  - Provide appropriate hygiene as needed including keeping skin clean and dry  - Evaluate need for skin moisturizer/barrier cream  - Collaborate with interdisciplinary team (i e  Nutrition, Rehabilitation, etc )   - Patient/family teaching  Outcome: Progressing  Goal: Incision(s), wounds(s) or drain site(s) healing without S/S of infection  Description: INTERVENTIONS  - Assess and document risk factors for skin impairment   - Assess and document dressing, incision, wound bed, drain sites and surrounding tissue  - Consider nutrition services referral as needed  - Oral mucous membranes remain intact  - Provide patient/ family education  Outcome: Progressing

## 2021-04-29 LAB
BACTERIA SPEC ANAEROBE CULT: NORMAL
BACTERIA SPEC ANAEROBE CULT: NORMAL
BACTERIA TISS AEROBE CULT: ABNORMAL
GLUCOSE SERPL-MCNC: 124 MG/DL (ref 65–140)
GLUCOSE SERPL-MCNC: 163 MG/DL (ref 65–140)
GLUCOSE SERPL-MCNC: 193 MG/DL (ref 65–140)
GLUCOSE SERPL-MCNC: 204 MG/DL (ref 65–140)
GRAM STN SPEC: ABNORMAL
SARS-COV-2 RNA RESP QL NAA+PROBE: NEGATIVE

## 2021-04-29 PROCEDURE — 82948 REAGENT STRIP/BLOOD GLUCOSE: CPT

## 2021-04-29 PROCEDURE — U0005 INFEC AGEN DETEC AMPLI PROBE: HCPCS | Performed by: SURGERY

## 2021-04-29 PROCEDURE — 99232 SBSQ HOSP IP/OBS MODERATE 35: CPT | Performed by: INTERNAL MEDICINE

## 2021-04-29 PROCEDURE — 99024 POSTOP FOLLOW-UP VISIT: CPT | Performed by: PLASTIC SURGERY

## 2021-04-29 PROCEDURE — U0003 INFECTIOUS AGENT DETECTION BY NUCLEIC ACID (DNA OR RNA); SEVERE ACUTE RESPIRATORY SYNDROME CORONAVIRUS 2 (SARS-COV-2) (CORONAVIRUS DISEASE [COVID-19]), AMPLIFIED PROBE TECHNIQUE, MAKING USE OF HIGH THROUGHPUT TECHNOLOGIES AS DESCRIBED BY CMS-2020-01-R: HCPCS | Performed by: SURGERY

## 2021-04-29 RX ADMIN — ASPIRIN 325 MG ORAL TABLET 325 MG: 325 PILL ORAL at 08:41

## 2021-04-29 RX ADMIN — APIXABAN 5 MG: 5 TABLET, FILM COATED ORAL at 08:42

## 2021-04-29 RX ADMIN — GABAPENTIN 100 MG: 100 CAPSULE ORAL at 08:42

## 2021-04-29 RX ADMIN — INSULIN LISPRO 2 UNITS: 100 INJECTION, SOLUTION INTRAVENOUS; SUBCUTANEOUS at 21:37

## 2021-04-29 RX ADMIN — DILTIAZEM HYDROCHLORIDE 180 MG: 60 TABLET, FILM COATED ORAL at 17:04

## 2021-04-29 RX ADMIN — METOPROLOL SUCCINATE 75 MG: 50 TABLET, EXTENDED RELEASE ORAL at 21:41

## 2021-04-29 RX ADMIN — DOCUSATE SODIUM 100 MG: 100 CAPSULE, LIQUID FILLED ORAL at 08:46

## 2021-04-29 RX ADMIN — INSULIN LISPRO 2 UNITS: 100 INJECTION, SOLUTION INTRAVENOUS; SUBCUTANEOUS at 11:37

## 2021-04-29 RX ADMIN — BACITRACIN 1 LARGE APPLICATION: 500 OINTMENT TOPICAL at 17:04

## 2021-04-29 RX ADMIN — AMPICILLIN SODIUM 2000 MG: 2 INJECTION, POWDER, FOR SOLUTION INTRAMUSCULAR; INTRAVENOUS at 11:17

## 2021-04-29 RX ADMIN — ATORVASTATIN CALCIUM 40 MG: 40 TABLET, FILM COATED ORAL at 16:11

## 2021-04-29 RX ADMIN — AMPICILLIN SODIUM 2000 MG: 2 INJECTION, POWDER, FOR SOLUTION INTRAMUSCULAR; INTRAVENOUS at 21:37

## 2021-04-29 RX ADMIN — INSULIN GLARGINE 15 UNITS: 100 INJECTION, SOLUTION SUBCUTANEOUS at 08:42

## 2021-04-29 RX ADMIN — METOPROLOL SUCCINATE 75 MG: 50 TABLET, EXTENDED RELEASE ORAL at 08:42

## 2021-04-29 RX ADMIN — LISINOPRIL 40 MG: 20 TABLET ORAL at 08:42

## 2021-04-29 RX ADMIN — MELATONIN TAB 3 MG 3 MG: 3 TAB at 21:41

## 2021-04-29 RX ADMIN — APIXABAN 5 MG: 5 TABLET, FILM COATED ORAL at 17:04

## 2021-04-29 RX ADMIN — CEFEPIME HYDROCHLORIDE 2000 MG: 2 INJECTION, POWDER, FOR SOLUTION INTRAVENOUS at 21:37

## 2021-04-29 RX ADMIN — QUETIAPINE FUMARATE 12.5 MG: 25 TABLET ORAL at 08:41

## 2021-04-29 RX ADMIN — CEFEPIME HYDROCHLORIDE 2000 MG: 2 INJECTION, POWDER, FOR SOLUTION INTRAVENOUS at 11:16

## 2021-04-29 RX ADMIN — AMPICILLIN SODIUM 2000 MG: 2 INJECTION, POWDER, FOR SOLUTION INTRAMUSCULAR; INTRAVENOUS at 16:11

## 2021-04-29 RX ADMIN — INSULIN LISPRO 1 UNITS: 100 INJECTION, SOLUTION INTRAVENOUS; SUBCUTANEOUS at 16:51

## 2021-04-29 RX ADMIN — QUETIAPINE FUMARATE 25 MG: 25 TABLET ORAL at 21:41

## 2021-04-29 RX ADMIN — DILTIAZEM HYDROCHLORIDE 180 MG: 60 TABLET, FILM COATED ORAL at 08:42

## 2021-04-29 RX ADMIN — BACITRACIN 1 LARGE APPLICATION: 500 OINTMENT TOPICAL at 08:49

## 2021-04-29 NOTE — PLAN OF CARE
Problem: Potential for Falls  Goal: Patient will remain free of falls  Description: INTERVENTIONS:  - Assess patient frequently for physical needs  -  Identify cognitive and physical deficits and behaviors that affect risk of falls    -  [place] fall precautions as indicated by assessment   - Educate patient/family on patient safety including physical limitations  - Instruct patient to call for assistance with activity based on assessment  - Modify environment to reduce risk of injury  - Consider OT/PT consult to assist with strengthening/mobility  Outcome: Progressing     Problem: PAIN - ADULT  Goal: Verbalizes/displays adequate comfort level or baseline comfort level  Description: Interventions:  - Encourage patient to monitor pain and request assistance  - Assess pain using appropriate pain scale  - Administer analgesics based on type and severity of pain and evaluate response  - Implement non-pharmacological measures as appropriate and evaluate response  - Consider cultural and social influences on pain and pain management  - Notify physician/advanced practitioner if interventions unsuccessful or patient reports new pain  Outcome: Progressing     Problem: INFECTION - ADULT  Goal: Absence or prevention of progression during hospitalization  Description: INTERVENTIONS:  - Assess and monitor for signs and symptoms of infection  - Monitor lab/diagnostic results  - Monitor all insertion sites, i e  indwelling lines, tubes, and drains  - Monitor endotracheal if appropriate and nasal secretions for changes in amount and color  - [place] appropriate cooling/warming therapies per order  - Administer medications as ordered  - Instruct and encourage patient and family to use good hand hygiene technique  - Identify and instruct in appropriate isolation precautions for identified infection/condition  Outcome: Progressing     Problem: SAFETY ADULT  Goal: Patient will remain free of falls  Description: INTERVENTIONS:  - Assess patient frequently for physical needs  -  Identify cognitive and physical deficits and behaviors that affect risk of falls    -  [place] fall precautions as indicated by assessment   - Educate patient/family on patient safety including physical limitations  - Instruct patient to call for assistance with activity based on assessment  - Modify environment to reduce risk of injury  - Consider OT/PT consult to assist with strengthening/mobility  Outcome: Progressing  Goal: Maintain or return to baseline ADL function  Description: INTERVENTIONS:  -  Assess patient's ability to carry out ADLs; assess patient's baseline for ADL function and identify physical deficits which impact ability to perform ADLs (bathing, care of mouth/teeth, toileting, grooming, dressing, etc )  - Assess/evaluate cause of self-care deficits   - Assess range of motion  - Assess patient's mobility; develop plan if impaired  - Assess patient's need for assistive devices and provide as appropriate  - Encourage maximum independence but intervene and supervise when necessary  - Involve family in performance of ADLs  - Assess for home care needs following discharge   - Consider OT consult to assist with ADL evaluation and planning for discharge  - Provide patient education as appropriate  Outcome: Progressing  Goal: Maintain or return mobility status to optimal level  Description: INTERVENTIONS:  - Assess patient's baseline mobility status (ambulation, transfers, stairs, etc )    - Identify cognitive and physical deficits and behaviors that affect mobility  - Identify mobility aids required to assist with transfers and/or ambulation (gait belt, sit-to-stand, lift, walker, cane, etc )  - [place] fall precautions as indicated by assessment  - Record patient progress and toleration of activity level on Mobility SBAR; progress patient to next Phase/Stage  - Instruct patient to call for assistance with activity based on assessment  - Consider rehabilitation consult to assist with strengthening/weightbearing, etc   Outcome: Progressing     Problem: DISCHARGE PLANNING  Goal: Discharge to home or other facility with appropriate resources  Description: INTERVENTIONS:  - Identify barriers to discharge w/patient and caregiver  - Arrange for needed discharge resources and transportation as appropriate  - Identify discharge learning needs (meds, wound care, etc )  - Arrange for interpretive services to assist at discharge as needed  - Refer to Case Management Department for coordinating discharge planning if the patient needs post-hospital services based on physician/advanced practitioner order or complex needs related to functional status, cognitive ability, or social support system  Outcome: Progressing     Problem: Knowledge Deficit  Goal: Patient/family/caregiver demonstrates understanding of disease process, treatment plan, medications, and discharge instructions  Description: Complete learning assessment and assess knowledge base  Interventions:  - Provide teaching at level of understanding  - Provide teaching via preferred learning methods  Outcome: Progressing     Problem: Nutrition/Hydration-ADULT  Goal: Nutrient/Hydration intake appropriate for improving, restoring or maintaining nutritional needs  Description: Monitor and assess patient's nutrition/hydration status for malnutrition  Collaborate with interdisciplinary team and initiate plan and interventions as ordered  Monitor patient's weight and dietary intake as ordered or per policy  Utilize nutrition screening tool and intervene as necessary  Determine patient's food preferences and provide high-protein, high-caloric foods as appropriate       INTERVENTIONS:  - Monitor oral intake, urinary output, labs, and treatment plans  - Assess nutrition and hydration status and recommend course of action  - Evaluate amount of meals eaten  - Assist patient with eating if necessary   - Allow adequate time for meals  - Recommend/ encourage appropriate diets, oral nutritional supplements, and vitamin/mineral supplements  - Order, calculate, and assess calorie counts as needed  - Recommend, monitor, and adjust tube feedings and TPN/PPN based on assessed needs  - Assess need for intravenous fluids  - Provide specific nutrition/hydration education as appropriate  - Include patient/family/caregiver in decisions related to nutrition  Outcome: Progressing     Problem: Prexisting or High Potential for Compromised Skin Integrity  Goal: Skin integrity is maintained or improved  Description: INTERVENTIONS:  - Identify patients at risk for skin breakdown  - Assess and monitor skin integrity  - Assess and monitor nutrition and hydration status  - Monitor labs   - Assess for incontinence   - Turn and reposition patient  - Assist with mobility/ambulation  - Relieve pressure over bony prominences  - Avoid friction and shearing  - Provide appropriate hygiene as needed including keeping skin clean and dry  - Evaluate need for skin moisturizer/barrier cream  - Collaborate with interdisciplinary team   - Patient/family teaching  - Consider wound care consult   Outcome: Progressing     Problem: CONFUSION/THOUGHT DISTURBANCE  Goal: Thought disturbances (confusion, delirium, depression, dementia or psychosis) are managed to maintain or return to baseline mental status and functional level  Description: INTERVENTIONS:  - Assess for possible contributors to  thought disturbance, including but not limited to medications, infection, impaired vision or hearing, underlying metabolic abnormalities, dehydration, respiratory compromise,  psychiatric diagnoses and notify attending PHYSICAN/AP  - Monitor and intervene to maintain adequate nutrition, hydration, elimination, sleep and activity  - Decrease environmental stimuli, including noise as appropriate  - Provide frequent contacts to provide refocusing, direction and reassurance as needed  Approach patient calmly with eye contact and at their level    - [place] high risk fall precautions, aspiration precautions and other safety measures, as indicated  - If delirium suspected, notify physician/AP of change in condition and request immediate in-person evaluation  - Pursue consults as appropriate including Geriatric (campus dependent), OT for cognitive evaluation/activity planning, psychiatric, pastoral care, etc   Outcome: Progressing     Problem: METABOLIC, FLUID AND ELECTROLYTES - ADULT  Goal: Electrolytes maintained within normal limits  Description: INTERVENTIONS:  - Monitor labs and assess patient for signs and symptoms of electrolyte imbalances  - Administer electrolyte replacement as ordered  - Monitor response to electrolyte replacements, including repeat lab results as appropriate  - Instruct patient on fluid and nutrition as appropriate  Outcome: Progressing  Goal: Glucose maintained within target range  Description: INTERVENTIONS:  - Monitor Blood Glucose as ordered  - Assess for signs and symptoms of hyperglycemia and hypoglycemia  - Administer ordered medications to maintain glucose within target range  - Assess nutritional intake and initiate nutrition service referral as needed  Outcome: Progressing     Problem: SKIN/TISSUE INTEGRITY - ADULT  Goal: Skin integrity remains intact  Description: INTERVENTIONS  - Identify patients at risk for skin breakdown  - Assess and monitor skin integrity  - Assess and monitor nutrition and hydration status  - Monitor labs (i e  albumin)  - Assess for incontinence   - Turn and reposition patient  - Assist with mobility/ambulation  - Relieve pressure over bony prominences  - Avoid friction and shearing  - Provide appropriate hygiene as needed including keeping skin clean and dry  - Evaluate need for skin moisturizer/barrier cream  - Collaborate with interdisciplinary team (i e  Nutrition, Rehabilitation, etc )   - Patient/family teaching  Outcome: Progressing  Goal: Incision(s), wounds(s) or drain site(s) healing without S/S of infection  Description: INTERVENTIONS  - Assess and document risk factors for skin impairment   - Assess and document dressing, incision, wound bed, drain sites and surrounding tissue  - Consider nutrition services referral as needed  - Oral mucous membranes remain intact  - Provide patient/ family education  Outcome: Progressing

## 2021-04-29 NOTE — CASE MANAGEMENT
[facility] able to accept pt  Insurance authorization to be obtained  CM confirmed with Daphne Hall plan is for pt to be discharged tomorrow  FOR INSURANCE AUTHORIZATION: MALENANPI # [id number] DR Colton Chi NPI # [id number]

## 2021-04-29 NOTE — PROGRESS NOTES
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   Patient is status post screw fixation right foot on 03/04, with operative bone scraping culture growing Enterobacter   Patient has been on p o  Doxycycline since  Patient is status post flap closure with wound dehiscence and exposed hardware  He is now status post I&D and removal of the last screw, with operative findings of osteomyelitis and deep infection   Wound culture with growth of Enterobacter  However, operative culture with growth of Enterobacter and Enterococcus  Although Enterococcus is ampicillin susceptible, Enterobacter is Unasyn resistant and Zosyn intermediate    Patient will need long-term IV antibiotic  Change antibiotic to high-dose IV cefepime/ampicillin  Treat x 6 weeks postop, through 6/8       2  Open right foot wound   Patient is status post flap closure, requiring return to OR twice for revision due to congestion   Wound has dehisced, most likely secondary to underlying deep infection  Patient is status post I&D and wound closure  Remaining hardware was also removed  Antibiotic plan as in above  Wound care per Podiatry and Plastic surgery      3  Status post screw fixation right foot on 03/04  Screw was removed 4/27      4  Status post failed right foot reconstruction, requiring TMA   All hardware prior to TMA were removed   However, as in above, patient is status post screw fixation of right foot after TMA  Screw was removed      5  DM, somewhat poorly controlled, with elevated hemoglobin A1c  Management per Medicine Service      Discussed with patient in detail regarding the above plan    Discussed with Dr Donna Woodward from Plastic surgery service earlier  Rx on chart for outpatient antibiotics    Patient should be okay for discharge to rehabilitation tomorrow  Follow-up with us 2 weeks after discharge      Antibiotics:  Ertapenem # 4  POD # 2     Subjective:  Patient is comfortable   Confusion mild and stable  No pain in right foot  Temperature stays down   No chills  He is tolerating antibiotic well   No nausea, vomiting or diarrhea      Objective:  Vitals:  Temp:  [97 6 °F (36 4 °C)-99 °F (37 2 °C)] 98 °F (36 7 °C)  HR:  [76-79] 76  Resp:  [18-20] 20  BP: (114-124)/(63-85) 124/85  SpO2:  [96 %-99 %] 99 %  Temp (24hrs), Av 2 °F (36 8 °C), Min:97 6 °F (36 4 °C), Max:99 °F (37 2 °C)  Current: Temperature: 98 °F (36 7 °C)    Physical Exam:     General: Awake, alert, cooperative, no distress  Neck:  Supple  No mass  No lymphadenopathy  Lungs: Expansion symmetric, no rales, no wheezing, respirations unlabored  Heart:  Regular rate and rhythm, S1 and S2 normal, no murmur  Abdomen: Soft, nondistended, non-tender, bowel sounds active all four quadrants, no masses, no organomegaly  Extremities: Stable mild right foot edema  Wound clean  No purulence  No erythema/warmth  Nontender  Skin:  No rash  Neuro: Moves all extremities  Invasive Devices     Peripherally Inserted Central Catheter Line            PICC Line 53//34 Right Basilic less than 1 day          Peripheral Intravenous Line            Peripheral IV 21 Distal;Dorsal (posterior); Left Forearm 2 days    Peripheral IV 21 Left;Ventral (anterior) Forearm 2 days                Labs studies:   I have personally reviewed pertinent labs  Invalid input(s): ALBUMIN      Results from last 7 days   Lab Units 21  1348 21  1345   GRAM STAIN RESULT  No Polys or Bacteria seen 1+ Gram negative rods*  1+ Gram positive cocci in pairs*  Rare Polys*       Imaging Studies:   I have personally reviewed pertinent imaging study reports and images in PACS  EKG, Pathology, and Other Studies:   I have personally reviewed pertinent reports

## 2021-04-29 NOTE — PROGRESS NOTES
Progress Note - Plastic Surgery   Eryn Urbina  73 y o  male MRN: [id number]  Unit/Bed#: [facility] 525-01 Encounter: [id number]    Assessment:  Principal Problem:    S/P transmetatarsal amputation of foot, right (HCC)  Active Problems:    Atrial fibrillation with RVR ([facility] )    Hyperlipidemia    Type 2 diabetes mellitus with diabetic neuropathy ([facility] )    Essential hypertension    Acquired absence of other right toe(s) ([facility] )    Deep disruption or dehiscence of operation wound    Ambulatory dysfunction    Diabetic ulcer of right 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, with fat layer exposed ([place] [address] )    Sleep disturbance    73 y o  male POD 16 from ALT reconstruction of R foot defect, subsequent takebacks on POD 0 and 1 for venous congestion  S/p hardware removal and irrigation and debridement on 4/27    Overall doing well  Clinically stable    VSS, A fib rate controlled     Plan:  - Local wound care with BID packing, abx per ID  - Cont Eliquis, daily aspirin  - Cont regular diet, off IVF  - Appreciate assistance of Gerontology, Cards, ID and IM  - Abx per ID, ertapemen  OR cultures from 4/27 enterobacter and enterococcus  - Dispo: skilled facility when discharged, anticipate tomorrow  - Continue dangle per protocol:  Post Operative Dangle Protocol:    Please "dangle" the patient's RLE for the dates and times listed below  OK to have the heel rest on a pillow/chux pad  Immediately elevate leg for any darker/purple discoloration of flap    4/22: 5 min 2x a day  4/23: 5 min 3x a day  4/24: 10 min 2x a day  4/25: 10 min 3x a day  4/26, 4/27: 15 min 2x a day  4/28, 4/29: 15 min 3x a day  4/30, 5/1: 20 min 2x a day  5/2, 5/3: 20 min 3x a day  5/4: 30 min once  5/5, 5/6: 30 min 2x a day  5/7, 5/8: 30 min 3x day  5/9: 45 min once  5/10, 5/11: 45 min 2x a day  5/12 and until cleared by Dr Severa Mart for longer: 45 min 3x a day    Again, immediately elevate leg for any darker/purple discoloration of flap      - Discussed with   Faye Burris MD  Resident, Plastic & Reconstructive Surgery  [phone] (c)     Subjective/Objective   Chief Complaint: R foot wound    Subjective: resting in bed  Pain controlled  Objective:     Blood pressure 114/64, pulse 78, temperature 97 6 °F (36 4 °C), temperature source Oral, resp  rate 18, height 6' 5" (1 956 m), weight 99 1 kg (218 lb 7 6 oz), SpO2 96 %  ,Body mass index is 25 91 kg/m²  Intake/Output Summary (Last 24 hours) at 4/29/2021 0738  Last data filed at 4/29/2021 0500  Gross per 24 hour   Intake 420 ml   Output 1550 ml   Net -1130 ml       Invasive Devices     Peripherally Inserted Central Catheter Line            PICC Line 09/28/50 Right Basilic less than 1 day          Peripheral Intravenous Line            Peripheral IV 04/26/21 Distal;Dorsal (posterior); Left Forearm 2 days    Peripheral IV 04/27/21 Left;Ventral (anterior) Forearm 1 day                Physical Exam:   Gen: NAD  Neuro: Alert, oriented  CV: regular rate  Pulm: no respiratory distress  Abd: nondistended  GI: condom catheter  Ext: RLE free flap is pink, soft, warm  Cap refill is about 2 seconds  Dehiscence at the distal and medial aspects of flap packing replaced  L lower leg staples removed  L thigh dressing replaced       Lab, Imaging and other studies:I have personally reviewed pertinent lab results      VTE Pharmacologic Prophylaxis: Sequential compression device (Venodyne), Eliquis (on hold for OR)  VTE Mechanical Prophylaxis: sequential compression device

## 2021-04-30 ENCOUNTER — TELEPHONE (OUTPATIENT)
Dept: OTHER | Facility: OTHER | Age: 71
End: 2021-04-30

## 2021-04-30 VITALS
DIASTOLIC BLOOD PRESSURE: 73 MMHG | RESPIRATION RATE: 18 BRPM | SYSTOLIC BLOOD PRESSURE: 117 MMHG | HEIGHT: 77 IN | OXYGEN SATURATION: 98 % | HEART RATE: 88 BPM | WEIGHT: 218.48 LBS | TEMPERATURE: 98.1 F | BODY MASS INDEX: 25.8 KG/M2

## 2021-04-30 DIAGNOSIS — B96.89 BACTEREMIA DUE TO ENTEROBACTER SPECIES: Primary | ICD-10-CM

## 2021-04-30 DIAGNOSIS — R78.81 BACTEREMIA DUE TO ENTEROBACTER SPECIES: Primary | ICD-10-CM

## 2021-04-30 LAB
GLUCOSE SERPL-MCNC: 119 MG/DL (ref 65–140)
GLUCOSE SERPL-MCNC: 260 MG/DL (ref 65–140)

## 2021-04-30 PROCEDURE — 99232 SBSQ HOSP IP/OBS MODERATE 35: CPT | Performed by: INTERNAL MEDICINE

## 2021-04-30 PROCEDURE — 82948 REAGENT STRIP/BLOOD GLUCOSE: CPT

## 2021-04-30 PROCEDURE — NC001 PR NO CHARGE: Performed by: PLASTIC SURGERY

## 2021-04-30 RX ORDER — DOCUSATE SODIUM 100 MG/1
100 CAPSULE, LIQUID FILLED ORAL 2 TIMES DAILY
Qty: 10 CAPSULE | Refills: 0 | Status: SHIPPED | OUTPATIENT
Start: 2021-04-30 | End: 2021-10-20 | Stop reason: ALTCHOICE

## 2021-04-30 RX ORDER — AMOXICILLIN 250 MG
1 CAPSULE ORAL
Refills: 0
Start: 2021-04-30 | End: 2021-05-11

## 2021-04-30 RX ORDER — POLYETHYLENE GLYCOL 3350 17 G/17G
17 POWDER, FOR SOLUTION ORAL DAILY
Refills: 0
Start: 2021-04-30 | End: 2021-08-09

## 2021-04-30 RX ORDER — QUETIAPINE FUMARATE 25 MG/1
12.5 TABLET, FILM COATED ORAL EVERY MORNING
Refills: 0
Start: 2021-04-30 | End: 2021-05-03

## 2021-04-30 RX ORDER — GABAPENTIN 100 MG/1
100 CAPSULE ORAL DAILY
Refills: 0
Start: 2021-04-30 | End: 2021-05-11

## 2021-04-30 RX ORDER — ASPIRIN 325 MG
325 TABLET ORAL DAILY
Refills: 0
Start: 2021-04-30 | End: 2021-08-09

## 2021-04-30 RX ORDER — LANOLIN ALCOHOL/MO/W.PET/CERES
3 CREAM (GRAM) TOPICAL
Refills: 0
Start: 2021-04-30 | End: 2021-08-09

## 2021-04-30 RX ORDER — QUETIAPINE FUMARATE 25 MG/1
25 TABLET, FILM COATED ORAL
Refills: 0
Start: 2021-04-30 | End: 2021-05-03

## 2021-04-30 RX ORDER — METOPROLOL SUCCINATE 25 MG/1
75 TABLET, EXTENDED RELEASE ORAL 2 TIMES DAILY
Refills: 0
Start: 2021-04-30 | End: 2021-05-18 | Stop reason: SDUPTHER

## 2021-04-30 RX ORDER — DILTIAZEM HCL 90 MG
180 TABLET ORAL 2 TIMES DAILY
Refills: 0
Start: 2021-04-30 | End: 2021-05-11

## 2021-04-30 RX ADMIN — LISINOPRIL 40 MG: 20 TABLET ORAL at 09:35

## 2021-04-30 RX ADMIN — AMPICILLIN SODIUM 2000 MG: 2 INJECTION, POWDER, FOR SOLUTION INTRAMUSCULAR; INTRAVENOUS at 09:35

## 2021-04-30 RX ADMIN — DILTIAZEM HYDROCHLORIDE 180 MG: 60 TABLET, FILM COATED ORAL at 17:11

## 2021-04-30 RX ADMIN — DILTIAZEM HYDROCHLORIDE 180 MG: 60 TABLET, FILM COATED ORAL at 09:35

## 2021-04-30 RX ADMIN — INSULIN LISPRO 3 UNITS: 100 INJECTION, SOLUTION INTRAVENOUS; SUBCUTANEOUS at 13:22

## 2021-04-30 RX ADMIN — INSULIN LISPRO 1 UNITS: 100 INJECTION, SOLUTION INTRAVENOUS; SUBCUTANEOUS at 17:20

## 2021-04-30 RX ADMIN — ATORVASTATIN CALCIUM 40 MG: 40 TABLET, FILM COATED ORAL at 17:11

## 2021-04-30 RX ADMIN — AMPICILLIN SODIUM 2000 MG: 2 INJECTION, POWDER, FOR SOLUTION INTRAMUSCULAR; INTRAVENOUS at 15:30

## 2021-04-30 RX ADMIN — BACITRACIN 1 LARGE APPLICATION: 500 OINTMENT TOPICAL at 17:11

## 2021-04-30 RX ADMIN — GABAPENTIN 100 MG: 100 CAPSULE ORAL at 09:36

## 2021-04-30 RX ADMIN — METOPROLOL SUCCINATE 75 MG: 50 TABLET, EXTENDED RELEASE ORAL at 09:35

## 2021-04-30 RX ADMIN — AMPICILLIN SODIUM 2000 MG: 2 INJECTION, POWDER, FOR SOLUTION INTRAMUSCULAR; INTRAVENOUS at 04:15

## 2021-04-30 RX ADMIN — APIXABAN 5 MG: 5 TABLET, FILM COATED ORAL at 17:11

## 2021-04-30 RX ADMIN — INSULIN GLARGINE 15 UNITS: 100 INJECTION, SOLUTION SUBCUTANEOUS at 09:42

## 2021-04-30 RX ADMIN — BACITRACIN 1 LARGE APPLICATION: 500 OINTMENT TOPICAL at 09:36

## 2021-04-30 RX ADMIN — APIXABAN 5 MG: 5 TABLET, FILM COATED ORAL at 09:36

## 2021-04-30 RX ADMIN — ASPIRIN 325 MG ORAL TABLET 325 MG: 325 PILL ORAL at 09:35

## 2021-04-30 RX ADMIN — CEFEPIME HYDROCHLORIDE 2000 MG: 2 INJECTION, POWDER, FOR SOLUTION INTRAVENOUS at 09:35

## 2021-04-30 RX ADMIN — QUETIAPINE FUMARATE 12.5 MG: 25 TABLET ORAL at 09:36

## 2021-04-30 RX ADMIN — DOCUSATE SODIUM 100 MG: 100 CAPSULE, LIQUID FILLED ORAL at 09:38

## 2021-04-30 NOTE — CASE MANAGEMENT
Cm spoke with Staff [facility]  Reference # [id number] emmy Juan C Crowder [id number]  Cinical documents faxed to [phone]     HELDER received Authorization from [facility] [id number] approved 4/30 for 5 days, nursing rug only, no therapy minutes NRD 5/4   Marcelle Kaur  fax [phone] phone [facility] [address] made aware and willing to accept pt  Transport set up BLS with [employer] for 430 pm today  Pt, pts nurse and Paco Hartley made aware

## 2021-04-30 NOTE — DISCHARGE INSTRUCTIONS
Artist Riser  Dr Asad Valladares  [facility], [address]  Phone: [phone]    Postoperative Instructions for Outpatient Surgery for [facility]  These instructions are being provided by your doctor to give you basic guidelines during your post-op recovery  Please let our office know if your contact information has changed  Please call the office today for an appointment in about 7 days for dressing change, wound check and suture removal     Dressings:   - R foot dressings as follows: 1/4-inch iodoform packing, followed by gauze, kerlex or ava, secure with tape, twice daily  - L thigh dressing: Replace dressing every other day with either (1) Mepilex or (2) petroleum gauze, 4x4 gauae, paper tape, until skin graft donor site has healed  - L lower leg: no dressing required  - R thigh: no dressing required    Activities:  elevate RLE whenever possible  OK to work with PT and OT with leg down but no longer than the dangle for that day, with 1-2 hours between the dangle and whenever therapy is occurring that may drop the leg  Dangle protocol: Dangle RLE from the edge of the bed, observing the flap for any darker/purple discoloration changes as follows  Immediately elevate leg for any darker/purple discoloration of flap  4/30, 5/1: 20 min 2x a day  5/2, 5/3: 20 min 3x a day  5/4: 30 min once  5/5, 5/6: 30 min 2x a day  5/7, 5/8: 30 min 3x day  5/9: 45 min once  5/10, 5/11: 45 min 2x a day  5/12 and until cleared by Dr Verito Kaye for longer: 45 min 3x a day     Again, immediately elevate leg for any darker/purple discoloration of flap  Bathing: Keep R foot dry    Medications:  Take your antibiotic as prescribed    Kd Bejarano MD  Resident, Plastic & Reconstructive Surgery  [phone] (c)

## 2021-04-30 NOTE — PROGRESS NOTES
Progress Note - Plastic Surgery   Isidoro Unger  73 y o  male MRN: [id number]  Unit/Bed#: [facility] 525-01 Encounter: [id number]    Assessment:  Principal Problem:    S/P transmetatarsal amputation of foot, right (HCC)  Active Problems:    [facility] )    Hyperlipidemia    Type 2 diabetes mellitus with diabetic neuropathy ([place] [address] )    Essential hypertension    Acquired absence of other right toe(s) ([place] [address] )    Deep disruption or dehiscence of operation wound    Ambulatory dysfunction    Diabetic ulcer of right 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, with fat layer exposed ([place] [address] )    Sleep disturbance    73 y o  male POD 17 from ALT reconstruction of R foot defect, subsequent takebacks on POD 0 and 1 for venous congestion  S/p hardware removal and irrigation and debridement on 4/27    Overall doing well  Clinically stable    VSS, A fib rate controlled     Plan:  - Local wound care with BID packing, abx per ID  - Cont Eliquis, daily aspirin  - Cont regular diet, off IVF  - Appreciate assistance of Gerontology, Cards, ID and IM  - Abx per ID  OR cultures from 4/27 enterobacter and enterococcus  - Dispo: skilled facility when discharged, anticipate today  - Continue dangle per protocol:  Post Operative Dangle Protocol:    Please "dangle" the patient's RLE for the dates and times listed below  OK to have the heel rest on a pillow/chux pad  Immediately elevate leg for any darker/purple discoloration of flap    4/22: 5 min 2x a day  4/23: 5 min 3x a day  4/24: 10 min 2x a day  4/25: 10 min 3x a day  4/26, 4/27: 15 min 2x a day  4/28, 4/29: 15 min 3x a day  4/30, 5/1: 20 min 2x a day  5/2, 5/3: 20 min 3x a day  5/4: 30 min once  5/5, 5/6: 30 min 2x a day  5/7, 5/8: 30 min 3x day  5/9: 45 min once  5/10, 5/11: 45 min 2x a day  5/12 and until cleared by Dr Arnulfo Davenport for longer: 45 min 3x a day    Again, immediately elevate leg for any darker/purple discoloration of flap      - Discussed with   Brandon Guerrero MD  Resident, Plastic & Reconstructive Surgery  [phone] (c)     Subjective/Objective   Chief Complaint: R foot wound    Subjective: resting in bed  Pain controlled  Objective:     Blood pressure (!) 172/95, pulse 82, temperature 98 5 °F (36 9 °C), resp  rate 18, height 6' 5" (1 956 m), weight 99 1 kg (218 lb 7 6 oz), SpO2 98 %  ,Body mass index is 25 91 kg/m²  Intake/Output Summary (Last 24 hours) at 4/30/2021 0831  Last data filed at 4/30/2021 0450  Gross per 24 hour   Intake 860 ml   Output 1450 ml   Net -590 ml       Invasive Devices     Peripherally Inserted Central Catheter Line            PICC Line 50/88/12 Right Basilic 1 day          Peripheral Intravenous Line            Peripheral IV 04/26/21 Distal;Dorsal (posterior); Left Forearm 3 days    Peripheral IV 04/27/21 Left;Ventral (anterior) Forearm 2 days                Physical Exam:   Gen: NAD  Neuro: Alert, oriented  CV: regular rate  Pulm: no respiratory distress  Abd: nondistended  GI: condom catheter  Ext: RLE free flap is pink, soft, warm  Cap refill is about 2 seconds  Dehiscence at the distal and medial aspects of flap packing replaced, erythema improved  L lower leg staples removed  L thigh dressing in place       Lab, Imaging and other studies:I have personally reviewed pertinent lab results      VTE Pharmacologic Prophylaxis: Sequential compression device (Venodyne), Eliquis (on hold for OR)  VTE Mechanical Prophylaxis: sequential compression device

## 2021-04-30 NOTE — PROGRESS NOTES
Progress Note - Infectious Disease   Bean Sandoval  73 y o  male MRN: [id number]  Unit/Bed#: [facility] 525-01 Encounter: [id number]      Impression/Recommendations:  1  Right foot cellulitis and osteomyelitis with hardware infection   Patient is status post screw fixation right foot on 03/04, with operative bone scraping culture growing Enterobacter   Patient has been on p o  Doxycycline since   Patient is status post flap closure with wound dehiscence and exposed hardware   He is now status post I&D and removal of the last screw, with operative findings of osteomyelitis and deep infection   Wound culture with growth of Enterobacter   However, operative culture with growth of Enterobacter and Enterococcus  Although Enterococcus is ampicillin susceptible, Enterobacter is Unasyn resistant and Zosyn intermediate    Patient will need long-term IV antibiotic  Continue high-dose IV cefepime/ampicillin  Treat x 6 weeks postop, through 6/8       2  Open right foot wound   Patient is status post flap closure, requiring return to OR twice for revision due to congestion   Wound has dehisced, most likely secondary to underlying deep infection   Patient is status post I&D and wound closure   Remaining hardware was also removed  Antibiotic plan as in above  Wound care per Podiatry and Plastic surgery      3  Status post screw fixation right foot on 03/04   Screw was removed 4/27      4  Status post failed right foot reconstruction, requiring TMA   All hardware prior to TMA were removed   However, as in above, patient is status post screw fixation of right foot after TMA   Screw was removed      5  DM, somewhat poorly controlled, with elevated hemoglobin A1c  Management per Medicine Service      Discussed with patient in detail regarding the above plan    Discussed with Dr Fauzia Washington from Plastic surgery service earlier  Rx on chart for outpatient antibiotics  Okay for discharge from ID viewpoint    Follow-up with us 2 weeks after discharge      Antibiotics:  Cefepime/ampicillin  Antibiotic # 5  POD # 3     Subjective:  Patient feels well   Confusion mild and stable  No pain in right foot  Temperature stays down   No chills  He is tolerating antibiotic well   No nausea, vomiting or diarrhea      Objective:  Vitals:  Temp:  [97 5 °F (36 4 °C)-98 5 °F (36 9 °C)] 98 5 °F (36 9 °C)  HR:  [82] 82  Resp:  [18] 18  BP: (134-172)/(75-95) 172/95  SpO2:  [98 %] 98 %  Temp (24hrs), Av 2 °F (36 8 °C), Min:97 5 °F (36 4 °C), Max:98 5 °F (36 9 °C)  Current: Temperature: 98 5 °F (36 9 °C)    Physical Exam:     General: Awake, alert, cooperative, no distress  Neck:  Supple  No mass  No lymphadenopathy  Lungs: Expansion symmetric, no rales, no wheezing, respirations unlabored  Heart:  Regular rate and rhythm, S1 and S2 normal, no murmur  Abdomen: Soft, nondistended, non-tender, bowel sounds active all four quadrants, no masses, no organomegaly  Extremities: Mild right ankle edema  Incision clean, without purulence  No erythema/warmth  Nontender to palpation  Skin:  No rash  Neuro: Moves all extremities  Invasive Devices     Peripherally Inserted Central Catheter Line            PICC Line 76/90/79 Right Basilic 1 day          Peripheral Intravenous Line            Peripheral IV 21 Distal;Dorsal (posterior); Left Forearm 3 days    Peripheral IV 21 Left;Ventral (anterior) Forearm 2 days                Labs studies:   I have personally reviewed pertinent labs  Invalid input(s): ALBUMIN      Results from last 7 days   Lab Units 21  1348 21  1345   GRAM STAIN RESULT  No Polys or Bacteria seen 1+ Gram negative rods*  1+ Gram positive cocci in pairs*  Rare Polys*       Imaging Studies:   I have personally reviewed pertinent imaging study reports and images in PACS  EKG, Pathology, and Other Studies:   I have personally reviewed pertinent reports

## 2021-04-30 NOTE — TRANSPORTATION MEDICAL NECESSITY
Section I - General Information    Name of Patient: Milad Dial  : 1950    Medicare #: [id number]  Transport Date: 21 (PCS is valid for round trips on this date and for all repetitive trips in the 60-day range as noted below )  Origin: [facility]                                                         Destination: [address] [place] [address]   Is the pt's stay covered under Medicare Part A (PPS/DRG)   []     Closest appropriate facility? If no, why is transport to more distant facility required? Yes  If hospice pt, is this transport related to pt's terminal illness? NA       Section II - Medical Necessity Questionnaire  Ambulance transportation is medically necessary only if other means of transport are contraindicated or would be potentially harmful to the patient  To meet this requirement, the patient must either be "bed confined" or suffer from a condition such that transport by means other than ambulance is contraindicated by the patient's condition  The following questions must be answered by the medical professional signing below for this form to be valid:    1)  Describe the MEDICAL CONDITION (physical and/or mental) of this patient AT [address] [place] [address] that requires the patient to be transported in an ambulance and why transport by other means is contraindicated by the patient's condition: NWB rle and must stay elevated    2) Is the patient "bed confined" as defined below? Yes  To be "be confined" the patient must satisfy all three of the following conditions: (1) unable to get up from bed without Assistance; AND (2) unable to ambulate; AND (3) unable to sit in a chair or wheelchair  3) Can this patient safely be transported by car or wheelchair van (i e , seated during transport without a medical attendant or monitoring)?    No    4) In addition to completing questions 1-3 above, please check any of the following conditions that apply*:   *Note: supporting documentation for any boxes checked must be maintained in the patient's medical records  If hosp-hosp transfer, describe services needed at 2nd facility not available at 1st facility? Unable to tolerate seated position for time needed to transport       Section III - Signature of Physician or Healthcare Professional  I certify that the above information is true and correct based on my evaluation of this patient, and represent that the patient requires transport by ambulance and that other forms of transport are contraindicated  I understand that this information will be used by the Centers for Medicare and Medicaid Services (CMS) to support the determination of medical necessity for ambulance services, and I represent that I have personal knowledge of the patient's condition at time of transport  []  If this box is checked, I also certify that the patient is physically or mentally incapable of signing the ambulance service's claim and that the institution with which I am affiliated has furnished care, services, or assistance to the patient  My signature below is made on behalf of the patient pursuant to 42 CFR §424 36(b)(4)  In accordance with 42 CFR §424 37, the specific reason(s) that the patient is physically or mentally incapable of signing the claim form is as follows:     Signature of Physician* or Healthcare Professional_______________miko Conner RN_______________________________________________  Signature Date 04/30/21 (For scheduled repetitive transports, this form is not valid for transports performed more than 60 days after this date)    Printed Name & Credentials of Physician or Healthcare Professional (MD, DO, RN, etc )________________________________  *Form must be signed by patient's attending physician for scheduled, repetitive transports   For non-repetitive, unscheduled ambulance transports, if unable to obtain the signature of the attending physician, any of the following may sign (choose appropriate option below)  [] Physician Assistant []  Clinical Nurse Specialist [x]  Registered Nurse  []  Nurse Practitioner  [] Discharge Planner

## 2021-05-03 ENCOUNTER — TRANSITIONAL CARE MANAGEMENT (OUTPATIENT)
Dept: FAMILY MEDICINE CLINIC | Facility: CLINIC | Age: 71
End: 2021-05-03

## 2021-05-03 ENCOUNTER — NURSING HOME VISIT (OUTPATIENT)
Dept: GERIATRICS | Facility: OTHER | Age: 71
End: 2021-05-03
Payer: COMMERCIAL

## 2021-05-03 VITALS
RESPIRATION RATE: 16 BRPM | DIASTOLIC BLOOD PRESSURE: 59 MMHG | SYSTOLIC BLOOD PRESSURE: 140 MMHG | HEART RATE: 64 BPM | BODY MASS INDEX: 25.31 KG/M2 | OXYGEN SATURATION: 98 % | WEIGHT: 213.4 LBS | TEMPERATURE: 97.5 F

## 2021-05-03 DIAGNOSIS — R26.2 AMBULATORY DYSFUNCTION: ICD-10-CM

## 2021-05-03 DIAGNOSIS — I48.91 ATRIAL FIBRILLATION WITH RVR (HCC): ICD-10-CM

## 2021-05-03 DIAGNOSIS — I10 ESSENTIAL HYPERTENSION: Chronic | ICD-10-CM

## 2021-05-03 DIAGNOSIS — G47.9 SLEEP DISTURBANCE: ICD-10-CM

## 2021-05-03 DIAGNOSIS — M86.171 ACUTE OSTEOMYELITIS OF RIGHT ANKLE OR FOOT (HCC): Primary | ICD-10-CM

## 2021-05-03 DIAGNOSIS — E11.40 TYPE 2 DIABETES MELLITUS WITH DIABETIC NEUROPATHY, WITHOUT LONG-TERM CURRENT USE OF INSULIN (HCC): Chronic | ICD-10-CM

## 2021-05-03 PROCEDURE — 99305 1ST NF CARE MODERATE MDM 35: CPT | Performed by: FAMILY MEDICINE

## 2021-05-03 RX ORDER — TRAZODONE HYDROCHLORIDE 50 MG/1
25 TABLET ORAL DAILY PRN
COMMUNITY
End: 2021-05-11

## 2021-05-03 NOTE — PROGRESS NOTES
[address], [place], [address] [address]  History and Physical  POS: 31    Records Reviewed include: Hospital records      Chief Complaint/ Reason for Admission:   Osteomyelitis right foot    History of Present Illness:       Mr Vipin Joshi is a 74 yo male who was recently admitted to [facility] due to right foot osteomyelitis underwent tarsometatarsal  amputation and surgical debridement , currently stable , on iv antibiotics, will be admitted to [facility] for [employer]  Will continue iv ampicillin and cefepime, monitor CBC, CMP , CRP weekly , continue wound care and continue PT/OT  Currently he denies pain, fever, chills, appetite is improving  Denies cP, SOB, palpitations, cough  No difficulty sleeping  Will discontinue seroquel and add trazodone PRN for insomnia  Allergies    Allergies   Allergen Reactions    Simvastatin Myalgia    Itraconazole Other (See Comments)     Pt denies knowledge of allergy           Past Medical History  Past Medical History:   Diagnosis Date    Arthritis     Atrial fibrillation ([address] )     Diagnosed 11/2018    Benign prostate hyperplasia 04/2002    Cellulitis     right lower leg     Colon polyp 2006    Diabetes mellitus ([address] )     Hearing aid worn     bilat    Hearing loss     90% loss left ear and 40% right ear    History of clubfoot     "since birth"    History of pneumonia     History of TIA (transient ischemic attack) 04/25/2017 11/30/18 pt denies    Hyperlipidemia 5/15/2014    Hypertension     Infectious viral hepatitis     "cant remember type"    Irregular heart beat     Neuropathy     both feet    Osteomyelitis ([address] )     right great toe    Right middle lobe pulmonary nodule 11/6/2018    Seasickness     Teeth missing     Type 2 diabetes mellitus with diabetic neuropathy ([address] ) 11/6/2018    Wears glasses     reading    Wound, open     bottom of right foot        Past Surgical History:   Procedure Laterality Date  BUNIONECTOMY Right 12/4/2018    Procedure: 5TH METATARSAL BONE PARTIAL RESECTION, FULL THICKNESS DEBRIDEMENT OF DIABETIC ULCER;  Surgeon: Alexandra Crowe DPM;  [facility];  Service: Podiatry    CLUB FOOT RELEASE Bilateral     COLONOSCOPY      COMPLEX WOUND CLOSURE TO EXTREMITY  4/27/2021    Procedure: COMPLEX WOUND CLOSURE TO EXTREMITY: RIGHT FOOT;  Surgeon: Kalli Vora MD;  [facility];  Service: Plastics    EXTERNAL FIXATOR APPLICATION Right 4/84/6935    Procedure: Application multiplane external fixation;  Surgeon: Marquita Landry DPM;  [facility];  Service: Podiatry    FOOT HARDWARE REMOVAL Right 2/17/2021    Procedure: REMOVAL EXTERNAL FIXATOR;  Surgeon: Marquita Landry DPM;  [facility];  Service: Podiatry    FOREIGN BODY REMOVAL Right 4/27/2021    Procedure: REMOVAL FOREIGN BODY EXTREMITY: RIGHT FOOT;  Surgeon: Kalli Vora MD;  [facility];  Service: Plastics    INCISION AND DRAINAGE OF WOUND Right 2/17/2021    Procedure: INCISION AND DRAINAGE (I&D) EXTREMITY;  Surgeon: Marquita Landry DPM;  [facility];  Service: Podiatry    ORIF FOOT FRACTURE Right 3/4/2021    Procedure: VAC PLACEMENT; SCREW FIXATION RIGHT FOOT FUSION;  Surgeon: Marquita Landry DPM;  [facility];  Service: Podiatry    [address] [place] [address] Right 2/22/2021    Procedure: AMPUTATION TRANSMETATARSAL (TMA), REMOVAL NAIL IM T2 ICF HARDWARE;  Surgeon: Marquita Landry DPM;  [facility];  Service: Podiatry    MD FUSION FOOT BONES,MIDTARSAL,OSTEOTMY Right 2/12/2021    Procedure: foot ARTHRODESIS/FUSION;  Surgeon: Marquita Landry DPM;  [facility];  Service: Podiatry    MD LENGTH/SHORT LEG/ANKL TENDON,SINGLE Right 2/12/2021    Procedure: LENGTHEN TIBIAL TENDON, TRANS HEEL CORD;  Surgeon: Marquita Landry DPM;  [facility];  Service: Podiatry    MD MUSCLE-SKIN FLAP,TRUNK Right 4/12/2021    Procedure: RECONSTRUCTION MICROSURGICAL W/ FREE FLAP;  Surgeon: Ion Miller MD;  [facility];  Service: Plastics    IN MUSCLE-SKIN FLAP,TRUNK Right 4/12/2021    Procedure: TAKEBACK RECONSTRUCTION MICROSURGICAL W/ FREE FLAP;  Surgeon: Ion Miller MD;  [facility];  Service: Plastics    IN MUSCLE-SKIN FLAP,TRUNK Right 4/13/2021    Procedure: RECONSTRUCTION MICROSURGICAL W/ FREE FLAP, RE EXPLORATION, MICRO VASCULAR REVISION;  Surgeon: Ion Miller MD;  [facility];  Service: Plastics    IN PART [address] [facility] [address] BONE METATARSAL HEAD,EA Right 12/29/2020    Procedure: EXCISION EXOSTOSIS;  Surgeon: Flower Hidalgo DPM;  [facility];  Service: Sherpawan FLORES WND EXTEN/COMPLIC Right 04/58/8884    Procedure: PRIMARY DELAYED CLOSURE;  Surgeon: Flower Hidalgo DPM;  [facility];  Service: Podiatry    TOE AMPUTATION Right     partial great toe    TONSILLECTOMY      VAC DRESSING APPLICATION Right 3/4/4306    Procedure: APPLICATION VAC DRESSING EXTREMITY;  Surgeon: Ion Miller MD;  [facility];  Service: Plastics    [address] [facility] Right 3/1/2021    Procedure: DEBRIDEMENT LOWER EXTREMITY ([facility] OUT); Surgeon: Ion Miller MD;  [facility];  Service: Plastics    WOUND DEBRIDEMENT Right 4/7/2021    Procedure: DEBRIDEMENT RIGHT FOOT;  Surgeon: Ion Miller MD;  [facility];  Service: Plastics    WOUND DEBRIDEMENT Right 4/12/2021    Procedure: DEBRIDEMENT LOWER EXTREMITY [facility] OUT);   Surgeon: Ion Miller MD;  [facility];  Service: Plastics    WOUND DEBRIDEMENT Right 4/27/2021    Procedure: DEBRIDEMENT LOWER EXTREMITY [facility] OUT): RIGHT FOOT;  Surgeon: Ion Miller MD;  [facility];  Service: Plastics       Family History  Family History   Problem Relation Age of Onset    Diabetes Father         mellitus       Social History  Social History     Tobacco Use   Smoking Status Former Smoker    Types: Cigarettes    Quit date: 1988    Years since quittin 5   Smokeless Tobacco Former User   Tobacco Comment    exposure to passive smoke      Social History     Substance and Sexual Activity   Alcohol Use Yes    Frequency: 2-4 times a month    Drinks per session: 1 or 2    Comment: socially      Social History     Substance and Sexual Activity   Drug Use Not Currently        Lives: Home,  Social Support: family  Fall in the past 12 months: no  Use of assistance Device: Walker    Physical Exam    Vital Signs    Vitals:    21 1318   BP: 140/59   Pulse: 64   Resp: 16   Temp: 97 5 °F (36 4 °C)   SpO2: 98%                 Physical Exam  Vitals signs and nursing note reviewed  Constitutional:       General: He is not in acute distress  Appearance: He is not diaphoretic  HENT:      Head: Normocephalic and atraumatic  Ears:      Comments: Hearing aids  Eyes:      General:         Right eye: No discharge  Left eye: No discharge  Pupils: Pupils are equal, round, and reactive to light  Neck:      Musculoskeletal: Normal range of motion and neck supple  Cardiovascular:      Rate and Rhythm: Normal rate  Rhythm irregular  Heart sounds: Normal heart sounds  No murmur  Pulmonary:      Effort: Pulmonary effort is normal  No respiratory distress  Breath sounds: Normal breath sounds  No wheezing  Abdominal:      Palpations: Abdomen is soft  Tenderness: There is no abdominal tenderness  There is no guarding or rebound  Musculoskeletal:         General: No tenderness  Right lower leg: No edema  Left lower leg: No edema  Comments: Amputation right foot   Skin:     General: Skin is warm and dry  Comments: Wound right foot  Surgical scars b/l LE   Neurological:      Mental Status: He is alert and oriented to person, place, and time  Mental status is at baseline  Psychiatric:         Behavior: Behavior normal          Review of Systems:  Review of Systems   Constitutional: Negative for appetite change, chills, fatigue and fever  HENT: Positive for hearing loss  Negative for congestion, rhinorrhea and sore throat  Eyes: Positive for visual disturbance  Respiratory: Negative for cough, shortness of breath and wheezing  Gastrointestinal: Negative for abdominal pain, constipation and nausea  Genitourinary: Negative for dysuria and hematuria  Musculoskeletal: Positive for gait problem  Negative for arthralgias and back pain  Skin: Negative for rash and wound  Allergic/Immunologic: Negative for environmental allergies  Neurological: Negative for dizziness and headaches  Hematological: Does not bruise/bleed easily  Psychiatric/Behavioral: Positive for sleep disturbance  List of Current Medications:    Medication reviewed  All orders signed  Complete list is in the paper chart  Allergies    Allergies   Allergen Reactions    Simvastatin Myalgia    Itraconazole Other (See Comments)     Pt denies knowledge of allergy           Labs/Diagnostics (reviewed by this provider): I personally reviewed lab results and imaging studies  Full reports are in the paper chart  Assessment/Plan:    Acute osteomyelitis of right ankle or foot (HCC)  Will continue iv ampicillin and cefepime  Monitor VS closely  Follow up with podiatry,continue wound care  Will monitor Cbc, CMP, CRP weekly or as clinically indicated  Continue PT/OT    Atrial fibrillation with RVR (HCC)  Currently rate controlled, asymptomatic  Continue same regimen and monitor  Continue anticoagulation with eliquis  Essential hypertension  Slightly elevated  Asymptomatic  Will continue metoprolol , diltiazem and monitor  Goal BP is <130/80    Ambulatory dysfunction  Will continue PT/OT  Placed on fall precautions    The goal is to return home    Sleep disturbance  Will discontinue seroquel  Start trazodone 25 mg QHS PRN insomnia  Will continue to monitor    Type 2 diabetes mellitus with diabetic neuropathy [facility])    Lab Results   Component Value Date    HGBA1C 7 9 (H) 03/17/2021     Will continue po medication  Monitor accu checks  Fasting and bedtime  Will adjust regimen accordingly         Pain: no  Rehab Potential:Good  Patient Informed of Medical Condition: yes   Patient is Capable of Understanding Their Right: yes   Discharge Plan: home  Vaccination:   Immunization History   Administered Date(s) Administered    INFLUENZA 12/04/2014, 09/30/2016, 09/30/2016, 11/19/2018    Influenza Split 01/14/2011, 12/20/2011, 11/26/2013    Influenza, high dose seasonal 0 7 mL 09/05/2018, 09/03/2019, 12/23/2020    Influenza, seasonal, injectable 09/11/2009, 12/04/2014    Pneumococcal Conjugate 13-Valent 10/24/2017, 11/17/2017    Pneumococcal Polysaccharide PPV23 03/06/2007, 01/04/2019    Zoster 09/18/2015     Advanced Directives: yes: Yes   Code status:Full Code  PCP: MD Megan Nunn MD  Geriatric Medicine  5/3/21  8:36 PM

## 2021-05-04 PROBLEM — M86.171 ACUTE OSTEOMYELITIS OF RIGHT ANKLE OR FOOT (HCC): Status: ACTIVE | Noted: 2021-05-04

## 2021-05-04 NOTE — OP NOTE
OPERATIVE REPORT  PATIENT NAME: Arina Laird  :  1950  MRN: [id number]  Pt Location: * No operating room entered *    SURGERY DATE: 2021    Surgeon(s) and Role:     * Geronimo Naranjo MD - Primary    Preop Diagnosis:  S/P transmetatarsal amputation of foot, right ([facility] ) [Z89 [id number]]    Post Diagnosis:   hardware infection    Procedure(s) (LRB):  EXCISIONAL DEBRIDEMENT LOWER EXTREMITY ([address] OUT): RIGHT FOOT (Right Leg Lower)   REMOVAL FOREIGN BODY EXTREMITY: RIGHT FOOT (Right Foot)   COMPLEX WOUND CLOSURE TO EXTREMITY: RIGHT FOOT    Specimen(s):  Cultures     Estimated Blood Loss:   minimal    Drains:  * No LDAs found *    Anesthesia Type:   IV Sedation with Anesthesia    Operative Indications:  S/P transmetatarsal amputation of foot, right ([facility] ) [[id number]]      Operative Findings:  Purulence from the metatarsal medulla canal after hardware removal (screw)     Complications:   None    Procedure and Technique:  Patient was met in the preoperative holding area and the last minute questions were properly answered and addressed  Site was marked and verified he was then taken to the operative theater and placed in supine position  After smooth anesthesia was then induced the right lower extremity was then prepped and draped in usual sterile fashion a time-out was then performed  Procedure started by 1st infiltrating 20 mL of 1% lidocaine with epinephrine along the edges of the medial suture line  Approximately 10 cm of inferomedial suture line sutures were then removed and blunt dissection was then performed along the undersurface of the distal flap with exposure of the 1st metatarsal the bone and capped with screw was readily visible  The decision was then made to remove the foreign body using inner screwdriver give the concerns were hardware infection and biofilm formation this area  This was removed satisfactorily and confirmed to be intact and passed of the table    Approximately 20 mL of purulence was noted along the canal and also surrounding soft tissue and cultures were then obtained  All the surface of the wound was then debrided using curette and scalpel down to healthy bleeding fascia a total surface of 10 x 4 x1 cm  The wound was then irrigated with 2 L normal saline and Betadine solution as well  Wound was then packed with iodoform packing and partial closure of the defect was then performed using the flap against the plantar surface of the foot skin  This was achieved with horizontal pledgets sutures of number 1-0 nylon for a total length of closure 7 cm  Xeroform dry dressing was then applied the conclusion of the case  The flap had adequate perfusion of the conclusion of the case as well confirmed Doppler       I was present for the entire procedure and I was present for all critical portions of the procedure    Patient Disposition:  PACU  and hemodynamically stable    SIGNATURE: Juan Pablo Lopez MD  DATE: May 4, 2021  TIME: 11:04 AM

## 2021-05-05 ENCOUNTER — NURSING HOME VISIT (OUTPATIENT)
Dept: GERIATRICS | Facility: OTHER | Age: 71
End: 2021-05-05
Payer: COMMERCIAL

## 2021-05-05 VITALS
RESPIRATION RATE: 18 BRPM | SYSTOLIC BLOOD PRESSURE: 128 MMHG | DIASTOLIC BLOOD PRESSURE: 61 MMHG | OXYGEN SATURATION: 97 % | TEMPERATURE: 97.8 F | HEART RATE: 78 BPM

## 2021-05-05 DIAGNOSIS — I48.91 ATRIAL FIBRILLATION WITH RVR (HCC): ICD-10-CM

## 2021-05-05 DIAGNOSIS — E11.40 TYPE 2 DIABETES MELLITUS WITH DIABETIC NEUROPATHY, WITHOUT LONG-TERM CURRENT USE OF INSULIN (HCC): Primary | Chronic | ICD-10-CM

## 2021-05-05 DIAGNOSIS — M86.171 ACUTE OSTEOMYELITIS OF RIGHT ANKLE OR FOOT (HCC): ICD-10-CM

## 2021-05-05 DIAGNOSIS — R26.2 AMBULATORY DYSFUNCTION: ICD-10-CM

## 2021-05-05 DIAGNOSIS — I10 ESSENTIAL HYPERTENSION: Chronic | ICD-10-CM

## 2021-05-05 PROCEDURE — 99309 SBSQ NF CARE MODERATE MDM 30: CPT | Performed by: NURSE PRACTITIONER

## 2021-05-05 NOTE — ASSESSMENT & PLAN NOTE
· Currently stable   ·  rate controlled and asymptomatic  · Continue with current medication regimen of Eliquis, Cardizem and metoprolol  · Follow-up with cardiology as recommended  · Continue to monitor

## 2021-05-05 NOTE — ASSESSMENT & PLAN NOTE
· IV ampicillin and cefepime until June 8th  · Has been afebrile  · Continue with vital signs and lab work  · Follow-up with Podiatry as recommended  · Continue with local wound care  · NWB to RLE  · Continue to monitor

## 2021-05-05 NOTE — DISCHARGE SUMMARY
Discharge Summary - Grace Hudson  73 y o  male MRN: [id number]    [facility] Encounter: [id number]    Admission Date:   Admission Orders (From admission, onward)     Ordered        04/07/21 1505  Inpatient Admission  Once                     Admitting Diagnosis: Acquired absence of other right toe(s) [facility]) [[id number]]    HPI: Grace Hudson  is a 68-year-old gentleman who has suffered a congenital clubfoot and Charcot foot  He has had multiple procedures with the podiatry service including external fixator application in February of 2021  Unfortunately he developed an infection requiring emergent surgery with hematoma evacuation and fasciotomies  Attempts at salvage of the reconstruction included removal of the nail and transmetatarsal amputation in February  He had been left with a large secondary defect with exposure of bone and a IM screw  Plastic and Reconstructive surgery had been consulted for salvage  His medical problems include atrial fibrillation and type 2 diabetes  Procedures Performed: No orders of the defined types were placed in this encounter  Summary of Hospital Course:  Mr Yue Zamora was admitted as an outpatient for a planned free flap reconstruction  We planned for an ALT flap for coverage of the wound  Upon induction of anesthesia the patient went into rapid ventricular response and the procedure was aborted  He is admitted for several days for medical optimization and adjustment of his medications with the cardiology service  We then proceeded with another debridement and reconstruction as previously planned  His initial surgery was compromised by flap congestion, the patient was returned to the operating room later that evening  The superficial venous system anastomosis was revised  This provided adequate outflow of the flap    Unfortunately, the following morning, the flap developed another congestion picture with drop in Vioptix signal   Then he was taken to the OR a 3rd time both venous anastomoses were revised this time  T after this his flap remained viable  His leg was kept elevated for a week  He had some delirium postoperatively, in the chair intelligence service was consulted and assisted with managing this  His pain was managed with IV and p o  Pain medications  His nutrition was optimized with supplements  The infectious disease service assisted with managing his antibiotics  Cardiology continue to assist with medical optimization of his atrial fibrillation, and Internal Medicine with his diabetes management  He did develop a wound dehiscence after approximately 10 days, cultures from this grew Enterococcus and Enterobacter  Antibiotics were adjusted appropriately  On April 22nd, we began dangling leg per protocol  This proceeded without incident  Patient was transferred to [facility] for rehabilitation on April 30th  He will follow-up with Dr Severa Mart is an outpatient  Significant Findings, Care, Treatment and Services Provided: see above    Complications: flap congestion x 2    Discharge Diagnosis: R foot wound    Resolved Problems  Date Reviewed: 5/4/2021    None          Condition at Discharge: good         Discharge instructions/Information to patient and family:   See after visit summary for information provided to patient and family  Provisions for Follow-Up Care:  See after visit summary for information related to follow-up care and any pertinent home health orders  PCP: rFeida Esteves MD    Disposition: Short-term rehab at [facility] Readmission: No      Discharge Statement   I spent 60 minutes discharging the patient  This time was spent on the day of discharge  I had direct contact with the patient on the day of discharge  Additional documentation is required if more than 30 minutes were spent on discharge       Discharge Medications:  See after visit summary for reconciled discharge medications provided to patient and family

## 2021-05-05 NOTE — ASSESSMENT & PLAN NOTE
· Issues with hypoglycemia in the morning, dropping into the 70s  · Will decrease glimepiride to 2 mg twice a day  · Encouraged patient to eat a snack at bedtime  · Continue with metformin 500 b i d  and Januvia 100 mg daily  · Diabetic diet  · Continue with Accu-Cheks

## 2021-05-05 NOTE — PROGRESS NOTES
[facility]  [address]  [phone]  Senior Care SNF List: [facility]   Code 31      NAME: Sesar Wu  AGE: 73 y o   SEX: male    DATE OF ENCOUNTER: 5/5/2021    Assessment and Plan     Problem List Items Addressed This Visit        Endocrine    Type 2 diabetes mellitus with diabetic neuropathy ([facility] [address] ) - Primary (Chronic)     · Issues with hypoglycemia in the morning, dropping into the 70s  · Will decrease glimepiride to 2 mg twice a day  · Encouraged patient to eat a snack at bedtime  · Continue with metformin 500 b i d  and Januvia 100 mg daily  · Diabetic diet  · Continue with Accu-Cheks            Cardiovascular and Mediastinum    Essential hypertension (Chronic)     · Currently stable   · Blood pressure this morning 128/60  · Goal is less then 130/80  · Encourage dietary lifestyle modifications  · Continue with current medication regimen of lisinopril and metoprolol  · Continue to monitor and adjust medications as needed         Atrial fibrillation with RVR (HCC)     · Currently stable   ·  rate controlled and asymptomatic  · Continue with current medication regimen of Eliquis, Cardizem and metoprolol  · Follow-up with cardiology as recommended  · Continue to monitor            Musculoskeletal and Integument    Acute osteomyelitis of right ankle or foot ([facility] [address] )     · IV ampicillin and cefepime until June 8th  · Has been afebrile  · Continue with vital signs and lab work  · Follow-up with Podiatry as recommended  · Continue with local wound care  · NWB to RLE  · Continue to monitor            Other    Ambulatory dysfunction     · PT/OT following  · Nonweightbearing to right lower extremity  · Currently requires a limited assist x1 for transfers and ambulation with rolling walker  · Fall precautions  · Continue with PT/OT for strength and balance training  · Continue to monitor               No orders of the defined types were placed in this encounter       - Counseling Documentation: patient was counseled regarding: importance of compliance with treatment    Chief Complaint     No chief complaint on file  History of Present Illness     Richard Mitchell is a 73year old male at [facility] for short term rehab after a recent hospitalization found to have R foot osteomyelitis, currently on IV antibiotics  He is being seen today as a follow up on his chronic medical conditions  Comorbidities include atrial fibrillation, hypertension, type 2 diabetes, and ambulatory dysfunction  Upon examination patient was lying in bed resting comfortably  He offered no complaints and says he is feeling good  He has a good appetite and is sleeping well at night  Pain is well controlled  He denies headaches, dizziness, chest pain, shortness of breath, cough, abdominal pain, and GI  upset  Per nursing no acute concerns issues at this time  Called to the patient's room around 2:30 p m  after he complained of some chest pressure  Vital signs 117/60, HR 54  Upon examination patient was lying in bed and chest pressure was gone  He said it happened right after he finished eating while he was lying in bed  He again denied headaches, dizziness, chest pain, chest pressure, abdominal pain, cough, and GI  upset  Will order stat CBC, CMP, troponin, BNP, and magnesium  Will also order stat EKG  Cardiology appointment asap  Q 4 vital signs  Continue to monitor closely  The following portions of the patient's history were reviewed and updated as appropriate: allergies, current medications, past family history, past medical history, past social history, past surgical history and problem list     Review of Systems     Review of Systems   Constitutional: Negative for activity change, appetite change, chills, fatigue and fever  HENT: Negative for congestion and sore throat  Eyes: Positive for visual disturbance (atfer hypoglycemia episodes )     Respiratory: Negative for cough, chest tightness and shortness of breath  Cardiovascular: Negative for chest pain and palpitations  Gastrointestinal: Negative for abdominal pain, constipation, diarrhea, nausea and vomiting  Genitourinary: Negative for difficulty urinating and dysuria  Musculoskeletal: Positive for gait problem  Negative for arthralgias and back pain  Skin: Negative for color change and rash  Neurological: Positive for weakness (RLE, s/p R TMA wound wound vac in place)  Negative for dizziness, seizures, syncope and light-headedness  Psychiatric/Behavioral: Negative for sleep disturbance  All other systems reviewed and are negative  Active Problem List     Patient Active Problem List   Diagnosis    Hyperlipidemia    Type 2 diabetes mellitus with diabetic neuropathy (HCC)    Atrial fibrillation with RVR ([facility] [address] )    Right middle lobe pulmonary nodule    Essential hypertension    Acquired absence of other right toe(s) ([facility] [address] )    Benign prostatic hyperplasia with lower urinary tract symptoms    Positive for microalbuminuria    Deep disruption or dehiscence of operation wound    Post-operative state    Aftercare for amputation stump    Ambulatory dysfunction    Diabetic ulcer of right 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, with fat layer exposed [facility]    S/P transmetatarsal amputation of foot, right ([facility])    Sleep disturbance    Acute osteomyelitis of right ankle or foot ([facility])       Objective     /61   Pulse 78   Temp 97 8 °F (36 6 °C)   Resp 18   SpO2 97%     Physical Exam  Vitals signs and nursing note reviewed  Constitutional:       General: He is not in acute distress  Appearance: Normal appearance  He is not ill-appearing  HENT:      Head: Normocephalic  Mouth/Throat:      Mouth: Mucous membranes are moist    Eyes:      General:         Right eye: No discharge  Left eye: No discharge  Extraocular Movements: Extraocular movements intact        Pupils: Pupils are equal, round, and reactive to light  Cardiovascular:      Rate and Rhythm: Normal rate  Rhythm irregularly irregular  Heart sounds: Normal heart sounds  No murmur  No gallop  Pulmonary:      Effort: Pulmonary effort is normal  No respiratory distress  Breath sounds: Normal breath sounds  No wheezing  Abdominal:      General: Bowel sounds are normal  There is no distension  Palpations: Abdomen is soft  Tenderness: There is no abdominal tenderness  Musculoskeletal:      Right lower leg: Edema (non pitting) present  Left lower leg: No edema  Skin:     General: Skin is warm and dry  Coloration: Skin is not jaundiced or pale  Findings: Wound (Wound R crystal, dressing CDI, sugical wound b/l LE) present  Neurological:      General: No focal deficit present  Mental Status: He is alert and oriented to person, place, and time  Mental status is at baseline  Motor: Weakness (wound vac in place to RLE) present  Gait: Gait abnormal (NWB to RLE)  Psychiatric:         Mood and Affect: Mood normal          Behavior: Behavior normal          Pertinent Laboratory/Diagnostic Studies:  Labs reviewed in facility paper chart  Current Medications   Medications were reviewed and updated  Please refer to paper chart for updated list of medications  Yessenia BETH  5/5/2021 1:54 PM    Please note:  Voice-recognition software may have been used in the preparation of this document  Occasional wrong word or "sound-alike" substitutions may have occurred due to the inherent limitations of voice recognition software  Interpretation should be guided by context

## 2021-05-05 NOTE — ASSESSMENT & PLAN NOTE
Lab Results   Component Value Date    HGBA1C 7 9 (H) 03/17/2021     Will continue po medication  Monitor accu checks  Fasting and bedtime  Will adjust regimen accordingly

## 2021-05-05 NOTE — ASSESSMENT & PLAN NOTE
Currently rate controlled, asymptomatic  Continue same regimen and monitor  Continue anticoagulation with eliquis

## 2021-05-05 NOTE — ASSESSMENT & PLAN NOTE
· PT/OT following  · Nonweightbearing to right lower extremity  · Currently requires a limited assist x1 for transfers and ambulation with rolling walker  · Fall precautions  · Continue with PT/OT for strength and balance training  · Continue to monitor

## 2021-05-05 NOTE — ASSESSMENT & PLAN NOTE
· Currently stable   · Blood pressure this morning 128/60  · Goal is less then 130/80  · Encourage dietary lifestyle modifications  · Continue with current medication regimen of lisinopril and metoprolol  · Continue to monitor and adjust medications as needed

## 2021-05-05 NOTE — ASSESSMENT & PLAN NOTE
Slightly elevated  Asymptomatic  Will continue metoprolol , diltiazem and monitor    Goal BP is <130/80

## 2021-05-05 NOTE — ASSESSMENT & PLAN NOTE
Will continue iv ampicillin and cefepime  Monitor VS closely  Follow up with podiatry,continue wound care  Will monitor Cbc, CMP, CRP weekly or as clinically indicated    Continue PT/OT

## 2021-05-06 ENCOUNTER — IMMUNIZATIONS (OUTPATIENT)
Dept: FAMILY MEDICINE CLINIC | Facility: HOSPITAL | Age: 71
End: 2021-05-06

## 2021-05-06 DIAGNOSIS — Z23 ENCOUNTER FOR IMMUNIZATION: Primary | ICD-10-CM

## 2021-05-06 PROCEDURE — 91300 SARS-COV-2 / COVID-19 MRNA VACCINE (PFIZER-BIONTECH) 30 MCG: CPT

## 2021-05-06 PROCEDURE — 0001A SARS-COV-2 / COVID-19 MRNA VACCINE (PFIZER-BIONTECH) 30 MCG: CPT

## 2021-05-07 ENCOUNTER — TELEPHONE (OUTPATIENT)
Dept: OTHER | Facility: OTHER | Age: 71
End: 2021-05-07

## 2021-05-07 ENCOUNTER — TRANSITIONAL CARE MANAGEMENT (OUTPATIENT)
Dept: FAMILY MEDICINE CLINIC | Facility: CLINIC | Age: 71
End: 2021-05-07

## 2021-05-07 RX ORDER — ESCITALOPRAM OXALATE 5 MG/1
5 TABLET ORAL DAILY
COMMUNITY
End: 2021-05-18 | Stop reason: SDUPTHER

## 2021-05-07 NOTE — PROGRESS NOTES
Escitalopram does not need to be sent to any pharmacy  Not sure why when I added the med, it placed an order   Please disregard

## 2021-05-10 ENCOUNTER — TELEPHONE (OUTPATIENT)
Dept: PLASTIC SURGERY | Facility: CLINIC | Age: 71
End: 2021-05-10

## 2021-05-10 NOTE — TELEPHONE ENCOUNTER
Called the patient and he stated that he had no questions or concerns  Confirmed appointment for Thursday, 5/13/2021

## 2021-05-11 ENCOUNTER — RA CDI HCC (OUTPATIENT)
Dept: OTHER | Facility: HOSPITAL | Age: 71
End: 2021-05-11

## 2021-05-11 ENCOUNTER — NURSING HOME VISIT (OUTPATIENT)
Dept: GERIATRICS | Facility: OTHER | Age: 71
End: 2021-05-11
Payer: COMMERCIAL

## 2021-05-11 ENCOUNTER — OFFICE VISIT (OUTPATIENT)
Dept: CARDIOLOGY CLINIC | Facility: CLINIC | Age: 71
End: 2021-05-11
Payer: COMMERCIAL

## 2021-05-11 VITALS
SYSTOLIC BLOOD PRESSURE: 120 MMHG | WEIGHT: 215 LBS | RESPIRATION RATE: 18 BRPM | DIASTOLIC BLOOD PRESSURE: 62 MMHG | HEART RATE: 43 BPM | HEIGHT: 77 IN | TEMPERATURE: 97.6 F | BODY MASS INDEX: 25.39 KG/M2 | OXYGEN SATURATION: 99 %

## 2021-05-11 VITALS
SYSTOLIC BLOOD PRESSURE: 146 MMHG | DIASTOLIC BLOOD PRESSURE: 80 MMHG | OXYGEN SATURATION: 98 % | RESPIRATION RATE: 16 BRPM | TEMPERATURE: 97.5 F | HEART RATE: 66 BPM

## 2021-05-11 DIAGNOSIS — R26.2 AMBULATORY DYSFUNCTION: ICD-10-CM

## 2021-05-11 DIAGNOSIS — I48.91 ATRIAL FIBRILLATION WITH RVR (HCC): Primary | ICD-10-CM

## 2021-05-11 DIAGNOSIS — T81.32XD DEEP DISRUPTION OR DEHISCENCE OF OPERATION WOUND, SUBSEQUENT ENCOUNTER: ICD-10-CM

## 2021-05-11 DIAGNOSIS — I10 ESSENTIAL HYPERTENSION: Chronic | ICD-10-CM

## 2021-05-11 DIAGNOSIS — E11.9 TYPE 2 DIABETES MELLITUS WITHOUT COMPLICATION, WITHOUT LONG-TERM CURRENT USE OF INSULIN (HCC): ICD-10-CM

## 2021-05-11 DIAGNOSIS — E11.40 TYPE 2 DIABETES MELLITUS WITH DIABETIC NEUROPATHY, WITHOUT LONG-TERM CURRENT USE OF INSULIN (HCC): Chronic | ICD-10-CM

## 2021-05-11 DIAGNOSIS — M86.171 ACUTE OSTEOMYELITIS OF RIGHT ANKLE OR FOOT (HCC): ICD-10-CM

## 2021-05-11 DIAGNOSIS — F41.9 ANXIETY: ICD-10-CM

## 2021-05-11 PROCEDURE — 93000 ELECTROCARDIOGRAM COMPLETE: CPT | Performed by: INTERNAL MEDICINE

## 2021-05-11 PROCEDURE — 99215 OFFICE O/P EST HI 40 MIN: CPT | Performed by: INTERNAL MEDICINE

## 2021-05-11 PROCEDURE — 99316 NF DSCHRG MGMT 30 MIN+: CPT | Performed by: NURSE PRACTITIONER

## 2021-05-11 PROCEDURE — 3008F BODY MASS INDEX DOCD: CPT | Performed by: NURSE PRACTITIONER

## 2021-05-11 RX ORDER — GLIMEPIRIDE 2 MG/1
2 TABLET ORAL 2 TIMES DAILY
COMMUNITY
End: 2021-05-18 | Stop reason: SDUPTHER

## 2021-05-11 RX ORDER — DILTIAZEM HCL 90 MG
90 TABLET ORAL 2 TIMES DAILY
COMMUNITY
End: 2021-05-18 | Stop reason: SDUPTHER

## 2021-05-11 RX ORDER — DILTIAZEM HYDROCHLORIDE 90 MG/1
90 CAPSULE, EXTENDED RELEASE ORAL 2 TIMES DAILY
COMMUNITY
End: 2021-05-11

## 2021-05-11 RX ORDER — GABAPENTIN 100 MG/1
100 CAPSULE ORAL 3 TIMES DAILY
COMMUNITY
End: 2021-05-18 | Stop reason: SDUPTHER

## 2021-05-11 RX ORDER — DILTIAZEM HCL 90 MG
90 TABLET ORAL 2 TIMES DAILY
Refills: 0
Start: 2021-05-11 | End: 2021-05-11

## 2021-05-11 NOTE — PROGRESS NOTES
[facility]  coding opportunities          Chart reviewed, no opportunity found: CHART REVIEWED, NO OPPORTUNITY FOUND              Patients insurance company: [employer] (Medicare Advantage and Commercial)

## 2021-05-11 NOTE — PROGRESS NOTES
[facility]  [address]  [phone]  Senior Care SNF List: [facility]   Code 31  Discharge Summary       NAME: Shelia Barnard  AGE: 73 y o   SEX: male    DATE OF ENCOUNTER: 05/11/2021    Assessment and Plan     Problem List Items Addressed This Visit        Endocrine    Type 2 diabetes mellitus with diabetic neuropathy (HCC) (Chronic)       Lab Results   Component Value Date    HGBA1C 7 9 (H) 03/17/2021     · Morning hypoglycemic episodes improved  · FBG have been between  since decrease in Glimepiride   · Follow a diabetic diet  · Encouraged weight loss   · Continue with Glimepiride 2mg bid, Januvia 100mg daily, metformin 500mg bid  · Follow up with PCP upon discharge          Relevant Medications    glimepiride (AMARYL) 2 mg tablet       Cardiovascular and Mediastinum    Essential hypertension (Chronic)     · Slightly elevated this morning,  Repeat BP at cardiology office 120/62 this afternoon  · SBP have been between 110-140s  · Encourage dietary and lifestyle modifications  · Continue with current medication regimen of lisinopril and metoprolol  · Follow-up with PCP upon discharge         Relevant Medications    diltiazem (CARDIZEM) 90 mg tablet    Atrial fibrillation with RVR ([address] ) - Primary     · EKG at cardiology today showed bradycardia  · Cardizem decreased to 90mg BID  · Clarified with Dr Dinora Faye with Cardiology through [employer] patient to continue with Metorpolol succinate BID and decreased Cardizem to 90mg bid  · Continue with Eliquis for anticoagulation  · To follow up with Taty Arboleda cardiology in [facility] upon discharge for further workup   · Follow a cardiac diet  · Continue to monitor          Relevant Medications    diltiazem (CARDIZEM) 90 mg tablet       Musculoskeletal and Integument    Acute osteomyelitis of right ankle or foot (HCC)     · IV ampicillin and Cefepime until June 8th  · Vital signs have been stable and he has been afebrile  · Continue with weekly blood work to be drawn by [facility]  · Continue with nonweightbearing status to right lower extremity  · PICC line to be discontinued after completion of IV antibiotic per  previous orders by ID  · Follow-up with podiatry and Infectious Disease as recommended              Other    Deep disruption or dehiscence of operation wound    Ambulatory dysfunction     · PT/OT following  · Currently requires a limited assist x1 for transfers and ambulation with rolling walker, must maintain nonweightbearing status to right lower extremity  · Continue fall precautions  · PT/OT to follow up patient upon discharge home for further strength and balance training         Anxiety     · Intermittent episodes of anxiety reported by nursing  · Was on Seroquel in the hospital, has since been discontinued  · Started on Lexapro 5 mg daily last week  · Follow-up with PCP upon discharge           Other Visit Diagnoses     Type 2 diabetes mellitus without complication, without long-term current use of insulin (HCC)        Relevant Medications    glimepiride (AMARYL) 2 mg tablet          No orders of the defined types were placed in this encounter       - Counseling Documentation: patient was counseled regarding: risks and benefits of treatment options and importance of compliance with treatment    Chief Complaint     No chief complaint on file  Historof Present Illness     Lance Burr is a 73year old male at [facility] for short term rehab after a recent hospitalization, found to have R foot osteomyelitis, currently on IV antibiotics through June 8th  He is being seen today as a follow up on his chronic medical conditions and for his pending discharge tomorrow  [facility] nursing, [place], and OT will be following upon discharge  He will have a transition of care appointment with his PCP on May 18th   Comorbidities include atrial fibrillation, hypertension, type 2 diabetes, and ambulatory dysfunction  While admitted to [facility] patient continued to work with PT/OT, currently requires limited assist x1 for transfers and ambulation with rolling walker, while maintaining nonweightbearing status to right lower extremity  Weekly labwork was trended  Seroquel was discontinued and trazodone p r n  for insomnia started  Glimepiride was decreased to 2 mg b i d  for hypoglycemia  Trazodone has since been discontinued for nonuse  Stat EKG and cardiology consult placed for 1 time episode of chest pressure  EKG showed atrial fib with, bradycardia  Had a follow-up appointment today with Cardiology, Cardizem decreased to 90 mg b i d  for bradycardia  He is to follow-up with cardiology at [facility] for further workup  Refer patient to optometry for right eye vision changes, per patient and wife it is  Not new  They both adamantly refused to go see Optometry while admitted to facility and will follow-up upon discharge  Lexapro was started for anxiety  She gabapentin was increased to 100 mg t i d for neuropathic pain  Script written for IV antibitoics and weekly blood work  Upon examination patient was lying in bed resting comfortably  He offered no complaints and says he is feeling good  He has a good appetite and is sleeping well at night  Pain is well controlled  He says he feels like he's progressed well with PT and is ready to be discharged home  He denies headaches, dizziness, chest pain, shortness of breath, cough, abdominal pain, and GI  upset  Per nursing no acute concerns issues at this time  Spoke with patient's wife Pauline Jensen and updated her on plan of care from Cardiology  Explained to her that the Cardizem was decreased to 90 mg b i d   Also explained to her that she needs to make a follow-up appointment with the cardiology office and \[facility]\"" for further workup  She said she would likely call tomorrow to make a follow-up appointment    All questions were answered  Review of Systems     Review of Systems   Constitutional: Negative for activity change, appetite change, chills, fatigue and fever  HENT: Negative for congestion and sore throat  Eyes: Positive for visual disturbance( R eye chronic per patient)  Respiratory: Negative for cough, chest tightness and shortness of breath  Cardiovascular: Negative for chest pain and palpitations  Gastrointestinal: Negative for abdominal pain, constipation, diarrhea, nausea and vomiting  Genitourinary: Negative for difficulty urinating and dysuria  Musculoskeletal: Positive for gait problem  Negative for arthralgias and back pain  Skin: Negative for color change and rash  Wound RLe  Neurological: Positive for weakness (RLE)  Negative for dizziness, seizures, syncope and light-headedness  Psychiatric/Behavioral: Negative for sleep disturbance  All other systems reviewed and are negative  Active Problem List     Patient Active Problem List   Diagnosis    Hyperlipidemia    Type 2 diabetes mellitus with diabetic neuropathy (HCC)    Atrial fibrillation with RVR ([facility] [address] )    Right middle lobe pulmonary nodule    Essential hypertension    Acquired absence of other right toe(s) ([facility] [address] )    Benign prostatic hyperplasia with lower urinary tract symptoms    Positive for microalbuminuria    Deep disruption or dehiscence of operation wound    Post-operative state    Aftercare for amputation stump    Ambulatory dysfunction    Diabetic ulcer of right 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, with fat layer exposed ([facility] )    S/P transmetatarsal amputation of foot, right ([facility])    Sleep disturbance    Acute osteomyelitis of right ankle or foot ([facility])    Anxiety       Objective     /80   Pulse 66   Temp 97 5 °F (36 4 °C)   Resp 16   SpO2 98%     Physical Exam  Vitals signs and nursing note reviewed  Constitutional:       General: He is not in acute distress       Appearance: Normal appearance  He is not ill-appearing  HENT:      Head: Normocephalic  Mouth/Throat:      Mouth: Mucous membranes are moist    Eyes:      General:         Right eye: No discharge  Left eye: No discharge  Extraocular Movements: Extraocular movements intact  Pupils: Pupils are equal, round, and reactive to light  Neck:      Musculoskeletal: Normal range of motion  Cardiovascular:      Rate and Rhythm: Normal rate  Rhythm irregularly irregular  Heart sounds: Normal heart sounds  No murmur  No gallop  Comments: Bradycardia at times    Pulmonary:      Effort: Pulmonary effort is normal  No respiratory distress  Breath sounds: Normal breath sounds  No wheezing  Abdominal:      General: Bowel sounds are normal  There is no distension  Palpations: Abdomen is soft  Tenderness: There is no abdominal tenderness  Musculoskeletal:         General: No swelling or tenderness  Right lower leg: No edema  Left lower leg: No edema  Skin:     General: Skin is warm and dry  Coloration: Skin is not jaundiced or pale  Findings: Wound (Wound R foot, dressing CDI, sugical wound from grafts b/l LE) present  No erythema  Neurological:      General: No focal deficit present  Mental Status: He is alert and oriented to person, place, and time  Mental status is at baseline  Motor: Weakness (NWB to RLE) present  Gait: Gait abnormal (NWB to RLE, RW OOB)  Psychiatric:         Attention and Perception: Attention normal          Mood and Affect: Mood normal          Speech: Speech normal          Behavior: Behavior normal  Behavior is cooperative  Pertinent Laboratory/Diagnostic Studies:  Labs reviewed in facility paper chart      Current Medications       Current Outpatient Medications:     Acetaminophen (MAPAP ARTHRITIS PAIN PO), Take 650 mg by mouth 2 (two) times a day, Disp: , Rfl:     diltiazem (CARDIZEM) 90 mg tablet, Take 90 mg by mouth 2 (two) times a day, Disp: , Rfl:     gabapentin (NEURONTIN) 100 mg capsule, Take 100 mg by mouth 3 (three) times a day, Disp: , Rfl:     glimepiride (AMARYL) 2 mg tablet, Take 2 mg by mouth 2 (two) times a day, Disp: , Rfl:     Sodium Chloride Flush (NORMAL SALINE FLUSH IV), Infuse 10 mL into a venous catheter 4 (four) times a day, Disp: , Rfl:     Acetaminophen (TYLENOL ARTHRITIS PAIN PO), Take 650 mg by mouth every 6 (six) hours as needed, Disp: , Rfl:     ampicillin 2,000 mg in sodium chloride 0 9 % 100 mL IVPB, Infuse 2,000 mg into a venous catheter every 6 (six) hours, Disp: , Rfl: 0    aspirin 325 mg tablet, Take 1 tablet (325 mg total) by mouth daily, Disp:  , Rfl: 0    atorvastatin (LIPITOR) 40 mg tablet, Take 1 tablet (40 mg total) by mouth daily, Disp: 90 tablet, Rfl: 3    Blood Glucose Monitoring Suppl (ACCU-CHEK JYOTI SMARTVIEW) w/Device KIT, use to check Blood Sugar as needed, Disp: , Rfl:     cefepime 2,000 mg in dextrose 5 % 50 mL IVPB, Infuse 2,000 mg into a venous catheter every 12 (twelve) hours, Disp: , Rfl: 0    docusate sodium (COLACE) 100 mg capsule, Take 1 capsule (100 mg total) by mouth 2 (two) times a day, Disp: 10 capsule, Rfl: 0    Eliquis 5 MG, Take 1 tablet (5 mg total) by mouth 2 (two) times a day, Disp: 60 tablet, Rfl: 2    escitalopram (LEXAPRO) 5 mg tablet, Take 5 mg by mouth daily, Disp: , Rfl:     glucose blood (Accu-Chek SmartView) test strip, Use as instructed BID, Disp: 200 each, Rfl: 1    Januvia 100 MG tablet, Take 1 tablet (100 mg total) by mouth daily, Disp: 132 tablet, Rfl: 3    lisinopril (ZESTRIL) 40 mg tablet, Take 1 tablet (40 mg total) by mouth daily, Disp: 90 tablet, Rfl: 3    melatonin 3 mg, Take 1 tablet (3 mg total) by mouth daily at bedtime, Disp: , Rfl: 0    metFORMIN (GLUCOPHAGE) 500 mg tablet, Take 500 mg by mouth 2 (two) times a day with meals, Disp: , Rfl:     metoprolol succinate (TOPROL-XL) 25 mg 24 hr tablet, Take 3 tablets (75 mg total) by mouth 2 (two) times a day, Disp:  , Rfl: 0    polyethylene glycol (MIRALAX) 17 g packet, Take 17 g by mouth daily, Disp:  , Rfl: 0      Time spent greater than 30 minutes with coordination of care, direct patient care, patient examination, teaching, and chart review  [address] [facility]  5/12/2021 9:38 AM    Please note:  Voice-recognition software may have been used in the preparation of this document  Occasional wrong word or "sound-alike" substitutions may have occurred due to the inherent limitations of voice recognition software  Interpretation should be guided by context

## 2021-05-11 NOTE — LETTER
May 12, 2021     Sade Amos, [address] [place] [id number]    Patient: Javy Ziegler  YOB: 1950   Date of Visit: 5/11/2021       Dear Dr Aurelio Trujillo:    Thank you for referring Sun Ross to me for evaluation  Below are my notes for this consultation  If you have questions, please do not hesitate to call me  I look forward to following your patient along with you  Sincerely,        KIRIT Hagan        CC: No Recipients  Yulissa Garcia, [facility]   5/11/2021  2:52 PM  Incomplete  [facility]  [address]  [phone]  [facility] List: [facility]   Code 31  Discharge Summary       NAME: Javy Ziegler  AGE: 73 y o  SEX: male    DATE OF ENCOUNTER: 5/11/2021    Assessment and Plan     Problem List Items Addressed This Visit        Endocrine    Type 2 diabetes mellitus with diabetic neuropathy (HCC) (Chronic)       Cardiovascular and Mediastinum    Essential hypertension (Chronic)    Atrial fibrillation with RVR (HCC) - Primary       Musculoskeletal and Integument    Acute osteomyelitis of right ankle or foot ([address] )       Other    Ambulatory dysfunction    Anxiety          No orders of the defined types were placed in this encounter       - Counseling Documentation: patient was counseled regarding: risks and benefits of treatment options and importance of compliance with treatment    Chief Complaint     No chief complaint on file        History of Present Illness     Sun Ross is a 44-year-old male at [facility] for short-term rehab after recent hospitalization for      The following portions of the patient's history were reviewed and updated as appropriate: allergies, current medications, past family history, past medical history, past social history, past surgical history and problem list     Review of Systems     Review of Systems   Constitutional: Negative for activity change, appetite change, chills, fatigue and fever    HENT: Negative for congestion and sore throat  Eyes: Positive for visual disturbance (R eye)  Respiratory: Negative for cough, chest tightness and shortness of breath  Cardiovascular: Negative for chest pain and palpitations  Gastrointestinal: Negative for abdominal pain, constipation, diarrhea, nausea and vomiting  Genitourinary: Negative for difficulty urinating and dysuria  Musculoskeletal: Positive for gait problem  Negative for arthralgias and back pain  Skin: Negative for color change and rash  Neurological: Positive for weakness (RLE, s/p R TMA wound wound vac in place) and numbness (chronic neuropathy )  Negative for dizziness, seizures, syncope and light-headedness  Psychiatric/Behavioral: Negative for sleep disturbance  The patient is nervous/anxious  All other systems reviewed and are negative  Active Problem List     Patient Active Problem List   Diagnosis    Hyperlipidemia    Type 2 diabetes mellitus with diabetic neuropathy (HCC)    Atrial fibrillation with RVR ([address] )    Right middle lobe pulmonary nodule    Essential hypertension    Acquired absence of other right toe(s) ([address] )    Benign prostatic hyperplasia with lower urinary tract symptoms    Positive for microalbuminuria    Deep disruption or dehiscence of operation wound    Post-operative state    Aftercare for amputation stump    Ambulatory dysfunction    Diabetic ulcer of right 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, with fat layer exposed ([facility]    S/P transmetatarsal amputation of foot, right ([facility] [facility] )    Sleep disturbance    Acute osteomyelitis of right ankle or foot ([address] )    Anxiety       Objective     There were no vitals taken for this visit  Physical Exam  Vitals signs and nursing note reviewed  Constitutional:       General: He is not in acute distress  Appearance: Normal appearance  He is not ill-appearing  HENT:      Head: Normocephalic        Mouth/Throat:      Mouth: Mucous membranes are moist    Eyes:      General:         Right eye: No discharge  Left eye: No discharge  Extraocular Movements: Extraocular movements intact  Pupils: Pupils are equal, round, and reactive to light  Cardiovascular:      Rate and Rhythm: Normal rate  Rhythm irregularly irregular  Heart sounds: Normal heart sounds  No murmur  No gallop  Pulmonary:      Effort: Pulmonary effort is normal  No respiratory distress  Breath sounds: Normal breath sounds  No wheezing  Abdominal:      General: Bowel sounds are normal  There is no distension  Palpations: Abdomen is soft  Tenderness: There is no abdominal tenderness  Musculoskeletal:      Right lower leg: Edema (non pitting) present  Left lower leg: No edema  Skin:     General: Skin is warm and dry  Coloration: Skin is not jaundiced or pale  Findings: Wound (Wound R crystal, dressing CDI, sugical wound b/l LE) present  Neurological:      General: No focal deficit present  Mental Status: He is alert and oriented to person, place, and time  Mental status is at baseline  Motor: Weakness (wound vac in place to RLE) present  Gait: Gait abnormal (NWB to RLE)  Psychiatric:         Attention and Perception: Attention normal          Mood and Affect: Mood normal          Speech: Speech normal          Behavior: Behavior normal  Behavior is cooperative  Pertinent Laboratory/Diagnostic Studies:  Labs reviewed in facility paper chart  Current Medications             Time spent greater than 30 minutes with coordination of care, direct patient care, patient examination, teaching, and chart review  Jey BETH  5/11/2021 2:45 PM    Please note:  Voice-recognition software may have been used in the preparation of this document  Occasional wrong word or "sound-alike" substitutions may have occurred due to the inherent limitations of voice recognition software    Interpretation should be guided by context

## 2021-05-11 NOTE — ASSESSMENT & PLAN NOTE
· PT/OT following  · Currently requires a limited assist x1 for transfers and ambulation with rolling walker, must maintain nonweightbearing status to right lower extremity  · Continue fall precautions  · PT/OT to follow up patient upon discharge home for further strength and balance training

## 2021-05-11 NOTE — PROGRESS NOTES
Progress Note - Cardiology Office  [facility]    Grace Hudson  73 y o  male MRN: [id number]  : 1950  Encounter: [id number]      ASSESSMENT:   Chronic atrial fibrillation with RVR  [id number] score 4    Echo, 2018:  EF 55% mild LVH, mild FRAN    On Eliquis 5 mg b i d  Cardizem 180 mg b i d  Toprol 75 mg daily    Hypertension  BP today is 120/62 the, heart rate is 47 per minute      On lisinopril  40 mg daily          Toprol 75 mg daily        Cardizem 180 mg b i d  Dm T2   diabetic right foot ulcer , s/p transmetatarsal amputation of right foot    Dyslipidemia  On atorvastatin 40 mg daily    Chest pressure:  Rule out coronary artery disease   Patient wishes to be seen by cardiologist in and have testing done in [place]      RECOMMENDATIONS:  Decrease Cardizem to 90 mg b i d  in view of his significant bradycardia    Would recommend repeat echocardiogram and pharmacologic nuclear stress test   However patient wishes to have further cardiology appointments and testing done in [facility]    Please call [phone] if any questions  HPI     Grace Hudson  is a 73y o  year old male who came for follow up  Patient has history of chronic atrial fibrillation, diabetes mellitus, diabetic feet with transmetatarsal amputation of right foot  He complains of occasional chest pressure and noted bruits it to some medication that he was started on during a recent hospitalization  I offered him to undergo a pharmacologic nuclear stress test and repeat echocardiogram since is last one was sometimes in 2018   Patient however does not wish to have any testing done in this area and would prefer to be seen by the cardiologists in [facility] and have testing done over there since he is in a wheelchair and [address] [place] [place] close to where he stays  Patient is bradycardic with resting heart rate of 47 per minute therefore I am going to decrease his Cardizem to 90 mg b i d (from 180 b  i d )    REVIEW OF SYSTEMS:  Complains of mild intermittent retrosternal chest pressure    Historical Information   Past Medical History:   Diagnosis Date    Arthritis     Atrial fibrillation ([facility] [address] )     Diagnosed 11/2018    Benign prostate hyperplasia 04/2002    Cellulitis     right lower leg     Colon polyp 2006    Diabetes mellitus ([address] )     Hearing aid worn     bilat    Hearing loss     90% loss left ear and 40% right ear    History of clubfoot     "since birth"    History of pneumonia     History of TIA (transient ischemic attack) 04/25/2017 11/30/18 pt denies    Hyperlipidemia 5/15/2014    Hypertension     Infectious viral hepatitis     "cant remember type"    Irregular heart beat     Neuropathy     both feet    Osteomyelitis ([facility] [address] )     right great toe    Right middle lobe pulmonary nodule 11/6/2018    Seasickness     Teeth missing     Type 2 diabetes mellitus with diabetic neuropathy ([facility] [address] ) 11/6/2018    Wears glasses     reading    Wound, open     bottom of right foot     Past Surgical History:   Procedure Laterality Date    BUNIONECTOMY Right 12/4/2018    Procedure: 5TH METATARSAL BONE PARTIAL RESECTION, FULL THICKNESS DEBRIDEMENT OF DIABETIC ULCER;  Surgeon: Kai Mckay DPM;  [facility];  Service: Podiatry    CLUB FOOT RELEASE Bilateral     COLONOSCOPY      COMPLEX WOUND CLOSURE TO EXTREMITY  4/27/2021    Procedure: COMPLEX WOUND CLOSURE TO EXTREMITY: RIGHT FOOT;  Surgeon: Milan Singh MD;  [facility];  Service: Plastics    EXTERNAL FIXATOR APPLICATION Right 3/30/1838    Procedure: Application multiplane external fixation;  Surgeon: Polly Swan DPM;  [facility];  Service: Podiatry    FOOT HARDWARE REMOVAL Right 2/17/2021    Procedure: REMOVAL EXTERNAL FIXATOR;  Surgeon: Polly Swan DPM;  [facility];  Service: Podiatry    FOREIGN BODY REMOVAL Right 4/27/2021    Procedure: REMOVAL FOREIGN BODY EXTREMITY: RIGHT FOOT; Surgeon: Norma Marti MD;  [facility];  Service: Plastics    INCISION AND DRAINAGE OF WOUND Right 2/17/2021    Procedure: INCISION AND DRAINAGE (I&D) EXTREMITY;  Surgeon: Chad Beaulieu DPM;  [facility];  Service: Podiatry    ORIF FOOT FRACTURE Right 3/4/2021    Procedure: VAC PLACEMENT; SCREW FIXATION RIGHT FOOT FUSION;  Surgeon: Chad Beaulieu DPM;  [facility];  Service: Podiatry    [facility] Right 2/22/2021    Procedure: AMPUTATION TRANSMETATARSAL (TMA), REMOVAL NAIL IM T2 ICF HARDWARE;  Surgeon: Chad Beaulieu DPM;  [facility];  Service: Podiatry    NV FUSION FOOT BONES,MIDTARSAL,OSTEOTMY Right 2/12/2021    Procedure: foot ARTHRODESIS/FUSION;  Surgeon: Chad Beaulieu DPM;  [facility];  Service: Podiatry    NV LENGTH/SHORT LEG/ANKL TENDON,SINGLE Right 2/12/2021    Procedure: LENGTHEN TIBIAL TENDON, TRANS HEEL CORD;  Surgeon: Chad Beaulieu DPM;  [facility];  Service: Podiatry    NV MUSCLE-SKIN FLAP,TRUNK Right 4/12/2021    Procedure: RECONSTRUCTION MICROSURGICAL W/ FREE FLAP;  Surgeon: Norma Marti MD;  [facility];  Service: Plastics    NV MUSCLE-SKIN FLAP,TRUNK Right 4/12/2021    Procedure: TAKEBACK RECONSTRUCTION MICROSURGICAL W/ FREE FLAP;  Surgeon: Norma Marti MD;  [facility];  Service: Plastics    NV MUSCLE-SKIN FLAP,TRUNK Right 4/13/2021    Procedure: RECONSTRUCTION MICROSURGICAL W/ FREE FLAP, RE EXPLORATION, MICRO VASCULAR REVISION;  Surgeon: Norma Marti MD;  [facility];  Service: Plastics    NV PART [address] BONE METATARSAL HEAD,EA Right 12/29/2020    Procedure: EXCISION EXOSTOSIS;  Surgeon: Chad Beaulieu DPM;  [facility];  Service: Podiatry    NV SECD CLOS SURG WND EXTEN/COMPLIC Right 20/30/2883    Procedure: PRIMARY DELAYED CLOSURE;  Surgeon: Chad Beaulieu DPM;  [facility];  Service: Podiatry    TOE AMPUTATION Right     partial great toe    TONSILLECTOMY      VAC DRESSING APPLICATION Right 1006    Procedure: APPLICATION VAC DRESSING EXTREMITY;  Surgeon: Erroll Skiff, MD;  [facility];  Service: Plastics    [address] Right 3/1/2021    Procedure: DEBRIDEMENT LOWER EXTREMITY [facility] OUT); Surgeon: Erroll Skiff, MD;  [facility];  Service: Plastics    WOUND DEBRIDEMENT Right 2021    Procedure: DEBRIDEMENT RIGHT FOOT;  Surgeon: Erroll Skiff, MD;  [facility];  Service: Plastics    WOUND DEBRIDEMENT Right 2021    Procedure: DEBRIDEMENT LOWER EXTREMITY [facility] OUT); Surgeon: Erroll Skiff, MD;  [facility];  Service: Plastics    WOUND DEBRIDEMENT Right 2021    Procedure: DEBRIDEMENT LOWER EXTREMITY [facility] OUT): RIGHT FOOT;  Surgeon: Erroll Skiff, MD;  [facility];  Service: Plastics     Social History     Substance and Sexual Activity   Alcohol Use Yes    Frequency: 2-4 times a month    Drinks per session: 1 or 2    Comment: socially     Social History     Substance and Sexual Activity   Drug Use Not Currently     Social History     Tobacco Use   Smoking Status Former Smoker    Types: Cigarettes    Quit date: 1988    Years since quittin 5   Smokeless Tobacco Former User   Tobacco Comment    exposure to passive smoke     Family History:   Family History   Problem Relation Age of Onset    Diabetes Father         mellitus       Meds/Allergies     Allergies   Allergen Reactions    Simvastatin Myalgia    Itraconazole Other (See Comments)     Pt denies knowledge of allergy           Current Outpatient Medications:     Acetaminophen (TYLENOL ARTHRITIS PAIN PO), Take 650 mg by mouth 2 (two) times a day, Disp: , Rfl:     ampicillin 2,000 mg in sodium chloride 0 9 % 100 mL IVPB, Infuse 2,000 mg into a venous catheter every 6 (six) hours, Disp: , Rfl: 0    aspirin 325 mg tablet, Take 1 tablet (325 mg total) by mouth daily, Disp:  , Rfl: 0    atorvastatin (LIPITOR) 40 mg tablet, Take 1 tablet (40 mg total) by mouth daily, Disp: 90 tablet, Rfl: 3    Blood Glucose Monitoring Suppl (ACCU-CHEK JYOTI SMARTVIEW) w/Device KIT, use to check Blood Sugar as needed, Disp: , Rfl:     cefepime 2,000 mg in dextrose 5 % 50 mL IVPB, Infuse 2,000 mg into a venous catheter every 12 (twelve) hours, Disp: , Rfl: 0    diltiazem (CARDIZEM) 90 mg tablet, Take 2 tablets (180 mg total) by mouth 2 (two) times a day, Disp:  , Rfl: 0    docusate sodium (COLACE) 100 mg capsule, Take 1 capsule (100 mg total) by mouth 2 (two) times a day, Disp: 10 capsule, Rfl: 0    Eliquis 5 MG, Take 1 tablet (5 mg total) by mouth 2 (two) times a day, Disp: 60 tablet, Rfl: 2    escitalopram (LEXAPRO) 5 mg tablet, Take 5 mg by mouth daily, Disp: , Rfl:     gabapentin (NEURONTIN) 100 mg capsule, Take 1 capsule (100 mg total) by mouth daily, Disp:  , Rfl: 0    glimepiride (AMARYL) 4 mg tablet, Take one tablet by mouth twice daily (Patient taking differently: Take 4 mg by mouth 2 (two) times a day Take one tablet by mouth twice daily), Disp: 180 tablet, Rfl: 3    glucose blood (Accu-Chek SmartView) test strip, Use as instructed BID, Disp: 200 each, Rfl: 1    Januvia 100 MG tablet, Take 1 tablet (100 mg total) by mouth daily, Disp: 132 tablet, Rfl: 3    lisinopril (ZESTRIL) 40 mg tablet, Take 1 tablet (40 mg total) by mouth daily, Disp: 90 tablet, Rfl: 3    melatonin 3 mg, Take 1 tablet (3 mg total) by mouth daily at bedtime, Disp: , Rfl: 0    metFORMIN (GLUCOPHAGE) 500 mg tablet, Take 500 mg by mouth 2 (two) times a day with meals, Disp: , Rfl:     metoprolol succinate (TOPROL-XL) 25 mg 24 hr tablet, Take 3 tablets (75 mg total) by mouth 2 (two) times a day, Disp:  , Rfl: 0    polyethylene glycol (MIRALAX) 17 g packet, Take 17 g by mouth daily, Disp:  , Rfl: 0    senna-docusate sodium (SENOKOT S) 8 6-50 mg per tablet, Take 1 tablet by mouth daily at bedtime, Disp:  , Rfl: 0    traZODone (DESYREL) 50 mg tablet, Take 25 mg by mouth daily as needed, Disp: , Rfl:     Vitals:   BP is 120/62 mmHg  HR 47/Min  BP Readings from Last 3 Encounters:   21 128/61   21 140/59   21 117/73       Physical Exam:    Neurologic:  Alert & oriented x 3, no new focal deficits, Not in any acute distress,  Constitutional:  Well developed, well nourished, non-toxic appearance   Eyes:  Pupil equal and reacting to light, conjunctiva normal,   HENT:  Atraumatic, oropharynx moist, Neck- normal range of motion, no tenderness,  Neck supple, No JVP, No LNP   Respiratory:  Bilateral air entry, mostly clear to auscultation  Cardiovascular: S1-S2 irregularly irregular, bradycardic with a I/VI ejection systolic murmur   GI:  Soft, nondistended, normal bowel sounds, nontender, no hepatosplenomegaly appreciated  Musculoskeletal:  Right foot is bandaged after transmetatarsal amputation  Skin:  Well hydrated, no rash   Lymphatic:  No lymphadenopathy noted   Extremities:  Right foot is bandaged after transmetatarsal amputation        Diagnostic Studies Review Cardio:      EKG:  Atrial fibrillation with slow ventricular response, VR 47 per minute    Cardiac testing:   Results for orders placed during the hospital encounter of 18   Echo complete with contrast if indicated    [address]  [address] [facility] [facility]  [phone]    Transthoracic Echocardiogram  2D, M-mode, Doppler, and Color Doppler    Study date:  2018    Patient: Ju Quiñones  MR number: [id number]  Account number: [de-identified]  : 1950  Age: 79 years  Gender: Male  Status: Inpatient  Location: Bedside  Height: 76 in  Weight: 238 lb  BP: 143/ 112 mmHg    Indications: Atrial fibrillation      Diagnoses: I48 0 - Atrial fibrillation    Sonographer:  JULIAN Yee  Primary Physician:  Lynda Douglass MD  Referring Physician:  Kyle Harmon DO  Group:  [id number]  Interpreting Physician:  Dirk Johnson MD    SUMMARY    LEFT VENTRICLE:  Systolic function was normal  Ejection fraction was estimated to be 55 %  There were no regional wall motion abnormalities  Wall thickness was mildly increased  LEFT ATRIUM:  The atrium was mildly dilated  RIGHT ATRIUM:  The atrium was mildly dilated  IVC, HEPATIC VEINS:  The inferior vena cava was mildly dilated  HISTORY: PRIOR HISTORY: Risk factors: hypertension, diabetes, and a former history of cigarette use (quitting more than one month ago)  PROCEDURE: The procedure was performed at the bedside  This was a routine study  The transthoracic approach was used  The study included complete 2D imaging, M-mode, complete spectral Doppler, and color Doppler  The heart rate was 90 bpm,  at the start of the study  Image quality was adequate  LEFT VENTRICLE: Size was normal  Systolic function was normal  Ejection fraction was estimated to be 55 %  There were no regional wall motion abnormalities  Wall thickness was mildly increased  DOPPLER: Transmitral flow pattern: atrial  fibrillation  RIGHT VENTRICLE: The size was normal  Systolic function was normal     LEFT ATRIUM: The atrium was mildly dilated  RIGHT ATRIUM: The atrium was mildly dilated  MITRAL VALVE: Valve structure was normal  There was normal leaflet separation  DOPPLER: The transmitral velocity was within the normal range  There was no evidence for stenosis  There was no significant regurgitation  AORTIC VALVE: The valve was trileaflet  Leaflets exhibited mildly increased thickness, normal cuspal separation, and sclerosis  DOPPLER: Transaortic velocity was within the normal range  There was no evidence for stenosis  There was no  regurgitation  TRICUSPID VALVE: The valve structure was normal  There was normal leaflet separation   DOPPLER: The transtricuspid velocity was within the normal range  There was no evidence for stenosis  There was no significant regurgitation  The  tricuspid jet envelope definition was inadequate for estimation of RV systolic pressure  PULMONIC VALVE: Leaflets exhibited normal thickness, no calcification, and normal cuspal separation  DOPPLER: The transpulmonic velocity was within the normal range  There was no significant regurgitation  PERICARDIUM: There was no pericardial effusion  AORTA: The root exhibited normal size  SYSTEMIC VEINS: IVC: The inferior vena cava was mildly dilated  SYSTEM MEASUREMENT TABLES    2D  %FS: 38 55 %  Ao Diam: 3 58 cm  EDV(Teich): 169 9 ml  EF(Teich): 68 07 %  ESV(Teich): 54 26 ml  IVSd: 1 07 cm  LA Area: 24 56 cm2  LA Diam: 4 24 cm  LVEDV MOD A4C: 138 09 ml  LVEF MOD A4C: 52 95 %  LVESV MOD A4C: 64 97 ml  LVIDd: 5 85 cm  LVIDs: 3 6 cm  LVLd A4C: 8 81 cm  LVLs A4C: 7 53 cm  LVPWd: 1 12 cm  RA Area: 25 78 cm2  RVIDd: 3 91 cm  SV MOD A4C: 73 12 ml  SV(Teich): 115 65 ml    [facility] Accredited Echocardiography Laboratory    Prepared and electronically signed by    Michael Hicks MD  Signed [id number] 12:58:05       No results found for this or any previous visit  No results found for this or any previous visit  No results found for this or any previous visit  No results found for this or any previous visit  No results found for this or any previous visit  Imaging:  Chest X-Ray:   No Chest XR results available for this patient  CT-scan of the chest:     Cta Lower Extremity Right W Wo Contrast    Result Date: 3/2/2021  Impression VASCULAR: Three-vessel runoff to the right foot  There are severe atherosclerotic calcifications of the tibioperoneal trunk, with moderate atherosclerotic disease at the adductor canal and popliteal artery  NONVASCULAR: 1   2 small fluid collections along the medial midfoot and posterior ankle likely represent abscesses given the clinical context    Per the electronic medical record, these were already recognized and incision and drainage has been performed  2   Redemonstrated extensive postsurgical changes to the forefoot and midfoot, with associated locules of gas extending along the dorsal base of the remaining metatarsals concerning for additional infection  3   Bilateral hydroceles  4   Prostatic enlargement   Workstation performed: [id number]     Lab Review   Lab Results   Component Value Date    WBC 12 54 (H) 04/15/2021    HGB 10 3 (L) 04/15/2021    HCT 32 6 (L) 04/15/2021    MCV 95 04/15/2021    RDW 15 8 (H) 04/15/2021     04/15/2021     BMP:  Lab Results   Component Value Date    SODIUM 135 (L) 04/18/2021    K 4 6 04/18/2021     (H) 04/18/2021    CO2 16 (L) 04/18/2021    ANIONGAP 10 05/22/2018    BUN 13 04/18/2021    CREATININE 0 79 04/18/2021    GLUC 129 04/18/2021    GLUF 214 (H) 11/16/2019    CALCIUM 8 6 04/18/2021    CORRECTEDCA 9 7 02/16/2021    EGFR 92 04/18/2021    MG 2 2 04/13/2021     LFT:  Lab Results   Component Value Date    AST 21 04/12/2021    ALT 59 04/12/2021    ALKPHOS 111 04/12/2021    TP 6 9 04/12/2021    ALB 3 5 04/12/2021      No components found for: [employer] [facility]  Lab Results   Component Value Date    [id number] 1 350 08/13/2019     Lab Results   Component Value Date    HGBA1C 7 9 (H) 03/17/2021     Lipid Profile:   Lab Results   Component Value Date    CHOLESTEROL 140 11/08/2018    HDL 38 (L) 11/08/2018    LDLCALC 72 11/08/2018    TRIG 148 11/08/2018     Lab Results   Component Value Date    CHOLESTEROL 140 11/08/2018     Lab Results   Component Value Date    TROPONINI <0 02 02/16/2021     Lab Results   Component Value Date    NTBNP 573 (H) 11/06/2018      Recent Results (from the past 672 hour(s))   Fingerstick Glucose (POCT)    Collection Time: 04/13/21 12:10 PM   Result Value Ref Range    POC Glucose 122 65 - 140 mg/dl   Fingerstick Glucose (POCT)    Collection Time: 04/13/21  1:37 PM   Result Value Ref Range    POC Glucose 125 65 - 140 mg/dl   CBC and Platelet    Collection Time: 04/13/21  2:12 PM   Result Value Ref Range    WBC 14 27 (H) 4 31 - 10 16 Thousand/uL    RBC 3 08 (L) 3 88 - 5 62 Million/uL    Hemoglobin 9 2 (L) 12 0 - 17 0 g/dL    Hematocrit 29 6 (L) 36 5 - 49 3 %    MCV 96 82 - 98 fL    MCH 29 9 26 8 - 34 3 pg    MCHC 31 1 (L) 31 4 - 37 4 g/dL    RDW 15 9 (H) 11 6 - 15 1 %    Platelets 165 948 - 413 Thousands/uL    MPV 10 4 8 9 - 12 7 fL   Basic metabolic panel    Collection Time: 04/13/21  2:12 PM   Result Value Ref Range    Sodium 145 136 - 145 mmol/L    Potassium 4 2 3 5 - 5 3 mmol/L    Chloride 116 (H) 100 - 108 mmol/L    CO2 23 21 - 32 mmol/L    ANION GAP 6 4 - 13 mmol/L    BUN 11 5 - 25 mg/dL    Creatinine 0 94 0 60 - 1 30 mg/dL    Glucose 146 (H) 65 - 140 mg/dL    Calcium 8 1 (L) 8 3 - 10 1 mg/dL    eGFR 82 ml/min/1 73sq m   Magnesium    Collection Time: 04/13/21  2:12 PM   Result Value Ref Range    Magnesium 2 2 1 6 - 2 6 mg/dL   Phosphorus    Collection Time: 04/13/21  2:12 PM   Result Value Ref Range    Phosphorus 2 7 2 3 - 4 1 mg/dL   APTT    Collection Time: 04/13/21  2:12 PM   Result Value Ref Range    PTT 30 23 - 37 seconds   Protime-INR    Collection Time: 04/13/21  2:12 PM   Result Value Ref Range    Protime 16 4 (H) 11 6 - 14 5 seconds    INR 1 32 (H) 0 84 - 1 19   Fingerstick Glucose (POCT)    Collection Time: 04/13/21  2:58 PM   Result Value Ref Range    POC Glucose 159 (H) 65 - 140 mg/dl   Fingerstick Glucose (POCT)    Collection Time: 04/13/21  5:34 PM   Result Value Ref Range    POC Glucose 208 (H) 65 - 140 mg/dl   Fingerstick Glucose (POCT)    Collection Time: 04/13/21  7:07 PM   Result Value Ref Range    POC Glucose 242 (H) 65 - 140 mg/dl   APTT    Collection Time: 04/13/21  8:34 PM   Result Value Ref Range    PTT 36 23 - 37 seconds   Fingerstick Glucose (POCT)    Collection Time: 04/13/21  9:22 PM   Result Value Ref Range    POC Glucose 215 (H) 65 - 140 mg/dl   Fingerstick Glucose (POCT) Collection Time: 04/13/21 11:34 PM   Result Value Ref Range    POC Glucose 215 (H) 65 - 140 mg/dl   Fingerstick Glucose (POCT)    Collection Time: 04/14/21 12:58 AM   Result Value Ref Range    POC Glucose 179 (H) 65 - 140 mg/dl   Fingerstick Glucose (POCT)    Collection Time: 04/14/21  3:03 AM   Result Value Ref Range    POC Glucose 158 (H) 65 - 140 mg/dl   APTT    Collection Time: 04/14/21  3:30 AM   Result Value Ref Range    PTT 59 (H) 23 - 37 seconds   Basic metabolic panel    Collection Time: 04/14/21  4:49 AM   Result Value Ref Range    Sodium 142 136 - 145 mmol/L    Potassium 4 1 3 5 - 5 3 mmol/L    Chloride 115 (H) 100 - 108 mmol/L    CO2 23 21 - 32 mmol/L    ANION GAP 4 4 - 13 mmol/L    BUN 9 5 - 25 mg/dL    Creatinine 0 81 0 60 - 1 30 mg/dL    Glucose 152 (H) 65 - 140 mg/dL    Calcium 8 2 (L) 8 3 - 10 1 mg/dL    eGFR 91 ml/min/1 73sq m   Fingerstick Glucose (POCT)    Collection Time: 04/14/21  5:21 AM   Result Value Ref Range    POC Glucose 135 65 - 140 mg/dl   CBC and Platelet    Collection Time: 04/14/21  6:30 AM   Result Value Ref Range    WBC 12 84 (H) 4 31 - 10 16 Thousand/uL    RBC 3 20 (L) 3 88 - 5 62 Million/uL    Hemoglobin 9 6 (L) 12 0 - 17 0 g/dL    Hematocrit 30 4 (L) 36 5 - 49 3 %    MCV 95 82 - 98 fL    MCH 30 0 26 8 - 34 3 pg    MCHC 31 6 31 4 - 37 4 g/dL    RDW 15 8 (H) 11 6 - 15 1 %    Platelets 600 002 - 626 Thousands/uL    MPV 10 8 8 9 - 12 7 fL   Prepare Leukoreduced RBC: 2 Units    Collection Time: 04/14/21  6:41 AM   Result Value Ref Range    Unit Product Code X5341X28     Unit Number [id number]     Unit ABO B     Unit RH POS     Crossmatch Compatible     Unit Dispense Status Return to [facility]     Unit Product Code P2707C63     Unit Number [id number]     Unit ABO B     Unit RH POS     Crossmatch Compatible     Unit Dispense Status Return to Inv    Fingerstick Glucose (POCT)    Collection Time: 04/14/21  9:06 AM   Result Value Ref Range    POC Glucose 170 (H) 65 - 140 mg/dl   APTT Collection Time: 04/14/21 10:51 AM   Result Value Ref Range    PTT 58 (H) 23 - 37 seconds   Fingerstick Glucose (POCT)    Collection Time: 04/14/21 11:13 AM   Result Value Ref Range    POC Glucose 150 (H) 65 - 140 mg/dl   Fingerstick Glucose (POCT)    Collection Time: 04/14/21  3:57 PM   Result Value Ref Range    POC Glucose 128 65 - 140 mg/dl   APTT    Collection Time: 04/14/21  5:17 PM   Result Value Ref Range    PTT 76 (H) 23 - 37 seconds   Fingerstick Glucose (POCT)    Collection Time: 04/14/21  9:02 PM   Result Value Ref Range    POC Glucose 135 65 - 140 mg/dl   APTT    Collection Time: 04/14/21 11:29 PM   Result Value Ref Range    PTT 75 (H) 23 - 37 seconds   CBC and Platelet    Collection Time: 04/15/21  4:48 AM   Result Value Ref Range    WBC 12 54 (H) 4 31 - 10 16 Thousand/uL    RBC 3 42 (L) 3 88 - 5 62 Million/uL    Hemoglobin 10 3 (L) 12 0 - 17 0 g/dL    Hematocrit 32 6 (L) 36 5 - 49 3 %    MCV 95 82 - 98 fL    MCH 30 1 26 8 - 34 3 pg    MCHC 31 6 31 4 - 37 4 g/dL    RDW 15 8 (H) 11 6 - 15 1 %    Platelets 136 523 - 883 Thousands/uL    MPV 11 4 8 9 - 12 7 fL   APTT    Collection Time: 04/15/21  4:48 AM   Result Value Ref Range    PTT 64 (H) 23 - 37 seconds   Fingerstick Glucose (POCT)    Collection Time: 04/15/21  6:24 AM   Result Value Ref Range    POC Glucose 192 (H) 65 - 140 mg/dl   Basic metabolic panel    Collection Time: 04/15/21  8:09 AM   Result Value Ref Range    Sodium 139 136 - 145 mmol/L    Potassium 3 8 3 5 - 5 3 mmol/L    Chloride 108 100 - 108 mmol/L    CO2 24 21 - 32 mmol/L    ANION GAP 7 4 - 13 mmol/L    BUN 12 5 - 25 mg/dL    Creatinine 0 82 0 60 - 1 30 mg/dL    Glucose 186 (H) 65 - 140 mg/dL    Calcium 8 4 8 3 - 10 1 mg/dL    eGFR 90 ml/min/1 73sq m   Fingerstick Glucose (POCT)    Collection Time: 04/15/21 11:00 AM   Result Value Ref Range    POC Glucose 155 (H) 65 - 140 mg/dl   Fingerstick Glucose (POCT)    Collection Time: 04/15/21  3:52 PM   Result Value Ref Range    POC Glucose 157 (H) 65 - 140 mg/dl   Fingerstick Glucose (POCT)    Collection Time: 04/15/21  8:41 PM   Result Value Ref Range    POC Glucose 140 65 - 140 mg/dl   Basic metabolic panel    Collection Time: 04/16/21  5:52 AM   Result Value Ref Range    Sodium 140 136 - 145 mmol/L    Potassium 4 3 3 5 - 5 3 mmol/L    Chloride 108 100 - 108 mmol/L    CO2 26 21 - 32 mmol/L    ANION GAP 6 4 - 13 mmol/L    BUN 14 5 - 25 mg/dL    Creatinine 0 84 0 60 - 1 30 mg/dL    Glucose 141 (H) 65 - 140 mg/dL    Calcium 9 0 8 3 - 10 1 mg/dL    eGFR 89 ml/min/1 73sq m   Fingerstick Glucose (POCT)    Collection Time: 04/16/21  6:50 AM   Result Value Ref Range    POC Glucose 157 (H) 65 - 140 mg/dl   Fingerstick Glucose (POCT)    Collection Time: 04/16/21 10:43 AM   Result Value Ref Range    POC Glucose 190 (H) 65 - 140 mg/dl   Fingerstick Glucose (POCT)    Collection Time: 04/16/21  4:32 PM   Result Value Ref Range    POC Glucose 178 (H) 65 - 140 mg/dl   Fingerstick Glucose (POCT)    Collection Time: 04/16/21  9:09 PM   Result Value Ref Range    POC Glucose 194 (H) 65 - 140 mg/dl   Fingerstick Glucose (POCT)    Collection Time: 04/17/21  7:55 AM   Result Value Ref Range    POC Glucose 190 (H) 65 - 140 mg/dl   Fingerstick Glucose (POCT)    Collection Time: 04/17/21 10:46 AM   Result Value Ref Range    POC Glucose 194 (H) 65 - 140 mg/dl   Fingerstick Glucose (POCT)    Collection Time: 04/17/21  4:16 PM   Result Value Ref Range    POC Glucose 179 (H) 65 - 140 mg/dl   Fingerstick Glucose (POCT)    Collection Time: 04/17/21  8:57 PM   Result Value Ref Range    POC Glucose 128 65 - 140 mg/dl   Basic metabolic panel    Collection Time: 04/18/21  4:42 AM   Result Value Ref Range    Sodium 135 (L) 136 - 145 mmol/L    Potassium 4 6 3 5 - 5 3 mmol/L    Chloride 113 (H) 100 - 108 mmol/L    CO2 16 (L) 21 - 32 mmol/L    ANION GAP 6 4 - 13 mmol/L    BUN 13 5 - 25 mg/dL    Creatinine 0 79 0 60 - 1 30 mg/dL    Glucose 129 65 - 140 mg/dL    Calcium 8 6 8 3 - 10 1 mg/dL eGFR 92 ml/min/1 73sq m   Fingerstick Glucose (POCT)    Collection Time: 04/18/21  6:24 AM   Result Value Ref Range    POC Glucose 149 (H) 65 - 140 mg/dl   Fingerstick Glucose (POCT)    Collection Time: 04/18/21 11:28 AM   Result Value Ref Range    POC Glucose 168 (H) 65 - 140 mg/dl   Fingerstick Glucose (POCT)    Collection Time: 04/18/21  4:24 PM   Result Value Ref Range    POC Glucose 171 (H) 65 - 140 mg/dl   Fingerstick Glucose (POCT)    Collection Time: 04/18/21  9:27 PM   Result Value Ref Range    POC Glucose 164 (H) 65 - 140 mg/dl   Fingerstick Glucose (POCT)    Collection Time: 04/19/21  6:39 AM   Result Value Ref Range    POC Glucose 144 (H) 65 - 140 mg/dl   Fingerstick Glucose (POCT)    Collection Time: 04/19/21 10:47 AM   Result Value Ref Range    POC Glucose 253 (H) 65 - 140 mg/dl   Fingerstick Glucose (POCT)    Collection Time: 04/19/21  4:26 PM   Result Value Ref Range    POC Glucose 191 (H) 65 - 140 mg/dl   Fingerstick Glucose (POCT)    Collection Time: 04/19/21  9:03 PM   Result Value Ref Range    POC Glucose 146 (H) 65 - 140 mg/dl   Fingerstick Glucose (POCT)    Collection Time: 04/20/21  6:35 AM   Result Value Ref Range    POC Glucose 149 (H) 65 - 140 mg/dl   Fingerstick Glucose (POCT)    Collection Time: 04/20/21 10:42 AM   Result Value Ref Range    POC Glucose 195 (H) 65 - 140 mg/dl   Fingerstick Glucose (POCT)    Collection Time: 04/20/21  3:55 PM   Result Value Ref Range    POC Glucose 128 65 - 140 mg/dl   Fingerstick Glucose (POCT)    Collection Time: 04/20/21  9:10 PM   Result Value Ref Range    POC Glucose 248 (H) 65 - 140 mg/dl   Fingerstick Glucose (POCT)    Collection Time: 04/21/21  6:38 AM   Result Value Ref Range    POC Glucose 186 (H) 65 - 140 mg/dl   Fingerstick Glucose (POCT)    Collection Time: 04/21/21 10:55 AM   Result Value Ref Range    POC Glucose 213 (H) 65 - 140 mg/dl   Fingerstick Glucose (POCT)    Collection Time: 04/21/21  4:13 PM   Result Value Ref Range    POC Glucose 268 (H) 65 - 140 mg/dl   Fingerstick Glucose (POCT)    Collection Time: 04/21/21  9:27 PM   Result Value Ref Range    POC Glucose 117 65 - 140 mg/dl   Fingerstick Glucose (POCT)    Collection Time: 04/22/21  6:27 AM   Result Value Ref Range    POC Glucose 164 (H) 65 - 140 mg/dl   Wound culture and Gram stain    Collection Time: 04/22/21  9:05 AM    Specimen: Foot, Right;  Wound   Result Value Ref Range    Wound Culture 2+ Growth of Enterobacter cloacae complex (A)     Wound Culture 2+ Growth of      Gram Stain Result 2+ Polys (A)     Gram Stain Result 2+ Gram negative rods (A)        Susceptibility    Enterobacter cloacae complex - BELKIS     ZID Performed Yes       Amoxicillin + Clavulanate >16/8 Resistant ug/ml     Ampicillin ($$) >16 00 Resistant ug/ml     Ampicillin + Sulbactam ($) >16/8 Resistant ug/ml     Aztreonam ($$$)  >16 Resistant ug/ml     Cefazolin ($) >16 00 Resistant ug/ml     Cefepime ($) 4 00 Susceptible ug/ml     Cefotaxime ($) >32 00 Resistant ug/ml     Ceftazidime ($$) >16 Resistant ug/ml     Ceftriaxone ($$) >32 00 Resistant ug/ml     Cefuroxime ($$) >16 Resistant ug/ml     Ciprofloxacin ($)  <=1 00 Susceptible ug/ml     Ertapenem ($$$) <=0 5 Susceptible ug/ml     Gentamicin ($$) <=1 Susceptible ug/ml     Levofloxacin ($) 0 50 Susceptible ug/ml     Piperacillin + Tazobactam ($$$) 64 Intermediate ug/ml     Tetracycline >8 Resistant ug/ml     Tobramycin ($) <=1 Susceptible ug/ml     Trimethoprim + Sulfamethoxazole ($$$) <=2/38 Susceptible ug/ml   Anaerobic culture and Gram stain    Collection Time: 04/22/21  9:05 AM    Specimen: Wound   Result Value Ref Range    Anaerobic Culture No anaerobes isolated    Fingerstick Glucose (POCT)    Collection Time: 04/22/21 10:57 AM   Result Value Ref Range    POC Glucose 259 (H) 65 - 140 mg/dl   Fingerstick Glucose (POCT)    Collection Time: 04/22/21  3:50 PM   Result Value Ref Range    POC Glucose 134 65 - 140 mg/dl   Fingerstick Glucose (POCT)    Collection Time: 04/22/21  9:13 PM   Result Value Ref Range    POC Glucose 185 (H) 65 - 140 mg/dl   Fingerstick Glucose (POCT)    Collection Time: 04/23/21  6:29 AM   Result Value Ref Range    POC Glucose 145 (H) 65 - 140 mg/dl   Fingerstick Glucose (POCT)    Collection Time: 04/23/21 10:58 AM   Result Value Ref Range    POC Glucose 245 (H) 65 - 140 mg/dl   Fingerstick Glucose (POCT)    Collection Time: 04/23/21  4:25 PM   Result Value Ref Range    POC Glucose 150 (H) 65 - 140 mg/dl   Fingerstick Glucose (POCT)    Collection Time: 04/23/21  9:02 PM   Result Value Ref Range    POC Glucose 185 (H) 65 - 140 mg/dl   Fingerstick Glucose (POCT)    Collection Time: 04/24/21  6:18 AM   Result Value Ref Range    POC Glucose 153 (H) 65 - 140 mg/dl   Fingerstick Glucose (POCT)    Collection Time: 04/24/21 12:00 PM   Result Value Ref Range    POC Glucose 243 (H) 65 - 140 mg/dl   Fingerstick Glucose (POCT)    Collection Time: 04/24/21  3:44 PM   Result Value Ref Range    POC Glucose 163 (H) 65 - 140 mg/dl   Fingerstick Glucose (POCT)    Collection Time: 04/24/21  9:38 PM   Result Value Ref Range    POC Glucose 154 (H) 65 - 140 mg/dl   Fingerstick Glucose (POCT)    Collection Time: 04/25/21  6:27 AM   Result Value Ref Range    POC Glucose 166 (H) 65 - 140 mg/dl   Fingerstick Glucose (POCT)    Collection Time: 04/25/21 10:43 AM   Result Value Ref Range    POC Glucose 207 (H) 65 - 140 mg/dl   Novel Coronavirus (Covid-19),PCR [facility]    Collection Time: 04/25/21  1:17 PM    Specimen: Nasopharyngeal Swab; Nares   Result Value Ref Range    SARS-CoV-2 Negative Negative   Fingerstick Glucose (POCT)    Collection Time: 04/25/21  4:46 PM   Result Value Ref Range    POC Glucose 168 (H) 65 - 140 mg/dl   Fingerstick Glucose (POCT)    Collection Time: 04/25/21  8:32 PM   Result Value Ref Range    POC Glucose 169 (H) 65 - 140 mg/dl   Fingerstick Glucose (POCT)    Collection Time: 04/26/21  6:14 AM   Result Value Ref Range    POC Glucose 119 65 - 140 mg/dl Fingerstick Glucose (POCT)    Collection Time: 04/26/21 11:16 AM   Result Value Ref Range    POC Glucose 175 (H) 65 - 140 mg/dl   Fingerstick Glucose (POCT)    Collection Time: 04/26/21  5:05 PM   Result Value Ref Range    POC Glucose 185 (H) 65 - 140 mg/dl   Fingerstick Glucose (POCT)    Collection Time: 04/26/21  9:02 PM   Result Value Ref Range    POC Glucose 170 (H) 65 - 140 mg/dl   Fingerstick Glucose (POCT)    Collection Time: 04/27/21  6:31 AM   Result Value Ref Range    POC Glucose 174 (H) 65 - 140 mg/dl   Fingerstick Glucose (POCT)    Collection Time: 04/27/21 10:44 AM   Result Value Ref Range    POC Glucose 199 (H) 65 - 140 mg/dl   Anaerobic culture and Gram stain    Collection Time: 04/27/21  1:45 PM    Specimen: Foot, Right; Tissue   Result Value Ref Range    Anaerobic Culture No anaerobes isolated    Culture, tissue and Gram stain    Collection Time: 04/27/21  1:45 PM    Specimen: Foot, Right; Tissue   Result Value Ref Range    Tissue Culture 2+ Growth of Enterobacter cloacae complex (A)     Tissue Culture 2+ Growth of Enterococcus faecalis (A)     Gram Stain Result 1+ Gram negative rods (A)     Gram Stain Result 1+ Gram positive cocci in pairs (A)     Gram Stain Result Rare Polys (A)        Susceptibility    Enterococcus faecalis - BEL[facility]     ZID Performed Yes      Enterobacter cloacae complex - [facility]     ZID Performed Yes     Anaerobic culture and Gram stain    Collection Time: 04/27/21  1:48 PM    Specimen: Foot, Right; Tissue   Result Value Ref Range    Anaerobic Culture No anaerobes isolated    Culture, tissue and Gram stain    Collection Time: 04/27/21  1:48 PM    Specimen: Foot, Right; Tissue   Result Value Ref Range    Tissue Culture 2+ Growth of Enterobacter cloacae complex (A)     Tissue Culture 2+ Growth of Enterococcus faecalis (A)     Gram Stain Result No Polys or Bacteria seen        Susceptibility    Enterococcus faecalis - BELKIS     ZID Performed Yes       Ampicillin ($$) <=2 00 Susceptible ug/ml     Gentamicin Synergy <=500 Susceptible ug/ml     Streptomycin Synergy <=1,000 Susceptible ug/ml     Vancomycin ($) 1 00 Susceptible ug/ml    Enterobacter cloacae complex - BELKIS     ZID Performed Yes       Amoxicillin + Clavulanate >16/8 Resistant ug/ml     Ampicillin ($$) >16 00 Resistant ug/ml     Ampicillin + Sulbactam ($) >16/8 Resistant ug/ml     Aztreonam ($$$)  >16 Resistant ug/ml     Cefazolin ($) >16 00 Resistant ug/ml     Cefepime ($) <=2 00 Susceptible ug/ml     Cefotaxime ($) >32 00 Resistant ug/ml     Ceftazidime ($$) >16 Resistant ug/ml     Ceftriaxone ($$) >32 00 Resistant ug/ml     Cefuroxime ($$) >16 Resistant ug/ml     Ciprofloxacin ($)  <=1 00 Susceptible ug/ml     Ertapenem ($$$) <=0 5 Susceptible ug/ml     Gentamicin ($$) <=1 Susceptible ug/ml     Levofloxacin ($) 0 50 Susceptible ug/ml     Piperacillin + Tazobactam ($$$) 64 Intermediate ug/ml     Tetracycline 8 Intermediate ug/ml     Tobramycin ($) <=1 Susceptible ug/ml     Trimethoprim + Sulfamethoxazole ($$$) <=2/38 Susceptible ug/ml   Fingerstick Glucose (POCT)    Collection Time: 04/27/21  2:25 PM   Result Value Ref Range    POC Glucose 162 (H) 65 - 140 mg/dl   Fingerstick Glucose (POCT)    Collection Time: 04/27/21  4:15 PM   Result Value Ref Range    POC Glucose 161 (H) 65 - 140 mg/dl   Fingerstick Glucose (POCT)    Collection Time: 04/27/21  8:32 PM   Result Value Ref Range    POC Glucose 190 (H) 65 - 140 mg/dl   Fingerstick Glucose (POCT)    Collection Time: 04/28/21  6:24 AM   Result Value Ref Range    POC Glucose 144 (H) 65 - 140 mg/dl   Fingerstick Glucose (POCT)    Collection Time: 04/28/21 11:07 AM   Result Value Ref Range    POC Glucose 199 (H) 65 - 140 mg/dl   Fingerstick Glucose (POCT)    Collection Time: 04/28/21  3:46 PM   Result Value Ref Range    POC Glucose 203 (H) 65 - 140 mg/dl   Fingerstick Glucose (POCT)    Collection Time: 04/28/21  8:53 PM   Result Value Ref Range    POC Glucose 198 (H) 65 - 140 mg/dl Fingerstick Glucose (POCT)    Collection Time: 04/29/21  6:18 AM   Result Value Ref Range    POC Glucose 124 65 - 140 mg/dl   Fingerstick Glucose (POCT)    Collection Time: 04/29/21 10:53 AM   Result Value Ref Range    POC Glucose 204 (H) 65 - 140 mg/dl   Fingerstick Glucose (POCT)    Collection Time: 04/29/21  4:16 PM   Result Value Ref Range    POC Glucose 163 (H) 65 - 140 mg/dl   Novel Coronavirus (Covid-19),PCR [facility]    Collection Time: 04/29/21  4:54 PM    Specimen: Nasopharyngeal Swab; Nares   Result Value Ref Range    SARS-CoV-2 Negative Negative   Fingerstick Glucose (POCT)    Collection Time: 04/29/21  9:03 PM   Result Value Ref Range    POC Glucose 193 (H) 65 - 140 mg/dl   Fingerstick Glucose (POCT)    Collection Time: 04/30/21  6:23 AM   Result Value Ref Range    POC Glucose 119 65 - 140 mg/dl   Fingerstick Glucose (POCT)    Collection Time: 04/30/21 11:20 AM   Result Value Ref Range    POC Glucose 260 (H) 65 - 140 mg/dl             Dr Cathryn Jacobo MD, [facility] [place]      "This note has been constructed using a voice recognition system  Therefore there may be syntax, spelling, and/or grammatical errors   Please call if you have any questions  "

## 2021-05-11 NOTE — ASSESSMENT & PLAN NOTE
Lab Results   Component Value Date    HGBA1C 7 9 (H) 03/17/2021     · Morning hypoglycemic episodes improved  · FBG have been between  since decrease in Glimepiride   · Follow a diabetic diet  · Encouraged weight loss   · Continue with Glimepiride 2mg bid, Januvia 100mg daily, metformin 500mg bid  · Follow up with PCP upon discharge

## 2021-05-11 NOTE — ASSESSMENT & PLAN NOTE
· Slightly elevated this morning,  Repeat BP at cardiology office 120/62 this afternoon  · SBP have been between 110-140s  · Encourage dietary and lifestyle modifications  · Continue with current medication regimen of lisinopril and metoprolol  · Follow-up with PCP upon discharge

## 2021-05-11 NOTE — ASSESSMENT & PLAN NOTE
· IV ampicillin and Cefepime until June 8th  · Vital signs have been stable and he has been afebrile  · Continue with weekly blood work to be drawn by [facility]  · Continue with nonweightbearing status to right lower extremity  · PICC line to be discontinued after completion of IV antibiotic per  previous orders by ID  · Follow-up with podiatry and Infectious Disease as recommended

## 2021-05-11 NOTE — ASSESSMENT & PLAN NOTE
· EKG at cardiology today showed bradycardia  · Cardizem decreased to 90mg BID  · Clarified with Dr Adi Alcocer with Cardiology through [facility] patient to continue with Metorpolol succinate BID and decreased Cardizem to 90mg bid  · Continue with Eliquis for anticoagulation  · To follow up with [facility] cardiology in [facility] upon discharge for further workup   · Follow a cardiac diet  · Continue to monitor

## 2021-05-11 NOTE — ASSESSMENT & PLAN NOTE
· Intermittent episodes of anxiety reported by nursing  · Was on Seroquel in the hospital, has since been discontinued  · Started on Lexapro 5 mg daily last week  · Follow-up with PCP upon discharge

## 2021-05-14 ENCOUNTER — TELEPHONE (OUTPATIENT)
Dept: GERIATRICS | Age: 71
End: 2021-05-14

## 2021-05-14 ENCOUNTER — TELEPHONE (OUTPATIENT)
Dept: FAMILY MEDICINE CLINIC | Facility: CLINIC | Age: 71
End: 2021-05-14

## 2021-05-14 NOTE — TELEPHONE ENCOUNTER
Lyn Dudley, from [place] [id number] [facility] left voicemail relaying patient did not want occupational therapy, but is ok with physical therapy and nursing

## 2021-05-17 ENCOUNTER — TRANSCRIBE ORDERS (OUTPATIENT)
Dept: ADMINISTRATIVE | Age: 71
End: 2021-05-17

## 2021-05-17 ENCOUNTER — APPOINTMENT (OUTPATIENT)
Dept: LAB | Age: 71
End: 2021-05-17
Payer: COMMERCIAL

## 2021-05-17 DIAGNOSIS — R78.81 BACTEREMIA: ICD-10-CM

## 2021-05-17 DIAGNOSIS — R78.81 BACTEREMIA: Primary | ICD-10-CM

## 2021-05-17 DIAGNOSIS — M86.411: ICD-10-CM

## 2021-05-17 LAB
ANION GAP SERPL CALCULATED.3IONS-SCNC: 6 MMOL/L (ref 4–13)
BASOPHILS # BLD AUTO: 0.04 THOUSANDS/ΜL (ref 0–0.1)
BASOPHILS NFR BLD AUTO: 1 % (ref 0–1)
BUN SERPL-MCNC: 18 MG/DL (ref 5–25)
CALCIUM SERPL-MCNC: 8.9 MG/DL (ref 8.3–10.1)
CHLORIDE SERPL-SCNC: 111 MMOL/L (ref 100–108)
CO2 SERPL-SCNC: 26 MMOL/L (ref 21–32)
CREAT SERPL-MCNC: 0.74 MG/DL (ref 0.6–1.3)
EOSINOPHIL # BLD AUTO: 0.5 THOUSAND/ΜL (ref 0–0.61)
EOSINOPHIL NFR BLD AUTO: 6 % (ref 0–6)
ERYTHROCYTE [DISTWIDTH] IN BLOOD BY AUTOMATED COUNT: 16.7 % (ref 11.6–15.1)
GFR SERPL CREATININE-BSD FRML MDRD: 94 ML/MIN/1.73SQ M
GLUCOSE SERPL-MCNC: 204 MG/DL (ref 65–140)
HCT VFR BLD AUTO: 38 % (ref 36.5–49.3)
HGB BLD-MCNC: 11.3 G/DL (ref 12–17)
IMM GRANULOCYTES # BLD AUTO: 0.03 THOUSAND/UL (ref 0–0.2)
IMM GRANULOCYTES NFR BLD AUTO: 0 % (ref 0–2)
LYMPHOCYTES # BLD AUTO: 1.87 THOUSANDS/ΜL (ref 0.6–4.47)
LYMPHOCYTES NFR BLD AUTO: 24 % (ref 14–44)
MCH RBC QN AUTO: 29.7 PG (ref 26.8–34.3)
MCHC RBC AUTO-ENTMCNC: 29.7 G/DL (ref 31.4–37.4)
MCV RBC AUTO: 100 FL (ref 82–98)
MONOCYTES # BLD AUTO: 0.62 THOUSAND/ΜL (ref 0.17–1.22)
MONOCYTES NFR BLD AUTO: 8 % (ref 4–12)
NEUTROPHILS # BLD AUTO: 4.77 THOUSANDS/ΜL (ref 1.85–7.62)
NEUTS SEG NFR BLD AUTO: 61 % (ref 43–75)
NRBC BLD AUTO-RTO: 0 /100 WBCS
PLATELET # BLD AUTO: 230 THOUSANDS/UL (ref 149–390)
PMV BLD AUTO: 11.3 FL (ref 8.9–12.7)
POTASSIUM SERPL-SCNC: 4.2 MMOL/L (ref 3.5–5.3)
RBC # BLD AUTO: 3.8 MILLION/UL (ref 3.88–5.62)
SODIUM SERPL-SCNC: 143 MMOL/L (ref 136–145)
WBC # BLD AUTO: 7.83 THOUSAND/UL (ref 4.31–10.16)

## 2021-05-17 PROCEDURE — 85025 COMPLETE CBC W/AUTO DIFF WBC: CPT

## 2021-05-17 PROCEDURE — 36415 COLL VENOUS BLD VENIPUNCTURE: CPT

## 2021-05-17 PROCEDURE — 80048 BASIC METABOLIC PNL TOTAL CA: CPT

## 2021-05-18 ENCOUNTER — OFFICE VISIT (OUTPATIENT)
Dept: FAMILY MEDICINE CLINIC | Facility: CLINIC | Age: 71
End: 2021-05-18
Payer: COMMERCIAL

## 2021-05-18 ENCOUNTER — TELEPHONE (OUTPATIENT)
Dept: INFECTIOUS DISEASES | Facility: CLINIC | Age: 71
End: 2021-05-18

## 2021-05-18 ENCOUNTER — OFFICE VISIT (OUTPATIENT)
Dept: INFECTIOUS DISEASES | Facility: CLINIC | Age: 71
End: 2021-05-18
Payer: COMMERCIAL

## 2021-05-18 VITALS
RESPIRATION RATE: 16 BRPM | HEIGHT: 77 IN | BODY MASS INDEX: 25.5 KG/M2 | OXYGEN SATURATION: 98 % | TEMPERATURE: 98.9 F | DIASTOLIC BLOOD PRESSURE: 70 MMHG | HEART RATE: 86 BPM | SYSTOLIC BLOOD PRESSURE: 140 MMHG

## 2021-05-18 VITALS
DIASTOLIC BLOOD PRESSURE: 60 MMHG | HEIGHT: 77 IN | TEMPERATURE: 97.3 F | SYSTOLIC BLOOD PRESSURE: 136 MMHG | BODY MASS INDEX: 25.5 KG/M2 | RESPIRATION RATE: 16 BRPM | HEART RATE: 86 BPM

## 2021-05-18 DIAGNOSIS — M86.171 ACUTE OSTEOMYELITIS OF RIGHT ANKLE OR FOOT (HCC): ICD-10-CM

## 2021-05-18 DIAGNOSIS — T81.32XD DEEP DISRUPTION OR DEHISCENCE OF OPERATION WOUND, SUBSEQUENT ENCOUNTER: ICD-10-CM

## 2021-05-18 DIAGNOSIS — Z89.431 S/P TRANSMETATARSAL AMPUTATION OF FOOT, RIGHT (HCC): Primary | ICD-10-CM

## 2021-05-18 DIAGNOSIS — L97.412 DIABETIC ULCER OF RIGHT MIDFOOT ASSOCIATED WITH TYPE 2 DIABETES MELLITUS, WITH FAT LAYER EXPOSED (HCC): ICD-10-CM

## 2021-05-18 DIAGNOSIS — E11.621 DIABETIC ULCER OF RIGHT MIDFOOT ASSOCIATED WITH TYPE 2 DIABETES MELLITUS, WITH FAT LAYER EXPOSED (HCC): ICD-10-CM

## 2021-05-18 DIAGNOSIS — E11.40 TYPE 2 DIABETES MELLITUS WITH DIABETIC NEUROPATHY, WITHOUT LONG-TERM CURRENT USE OF INSULIN (HCC): Primary | Chronic | ICD-10-CM

## 2021-05-18 DIAGNOSIS — Z89.431 S/P TRANSMETATARSAL AMPUTATION OF FOOT, RIGHT (HCC): ICD-10-CM

## 2021-05-18 DIAGNOSIS — E11.40 TYPE 2 DIABETES MELLITUS WITH DIABETIC NEUROPATHY, WITHOUT LONG-TERM CURRENT USE OF INSULIN (HCC): Chronic | ICD-10-CM

## 2021-05-18 DIAGNOSIS — I48.91 ATRIAL FIBRILLATION WITH RVR (HCC): ICD-10-CM

## 2021-05-18 PROCEDURE — 99214 OFFICE O/P EST MOD 30 MIN: CPT | Performed by: PHYSICIAN ASSISTANT

## 2021-05-18 PROCEDURE — 99496 TRANSJ CARE MGMT HIGH F2F 7D: CPT | Performed by: NURSE PRACTITIONER

## 2021-05-18 RX ORDER — DILTIAZEM HCL 90 MG
90 TABLET ORAL 2 TIMES DAILY
Qty: 60 TABLET | Refills: 1 | Status: SHIPPED | OUTPATIENT
Start: 2021-05-18 | End: 2021-06-28

## 2021-05-18 RX ORDER — GABAPENTIN 100 MG/1
100 CAPSULE ORAL 3 TIMES DAILY
Qty: 90 CAPSULE | Refills: 1 | Status: SHIPPED | OUTPATIENT
Start: 2021-05-18 | End: 2021-05-19

## 2021-05-18 RX ORDER — QUETIAPINE FUMARATE 25 MG/1
12.5 TABLET, FILM COATED ORAL
COMMUNITY
End: 2021-05-19

## 2021-05-18 RX ORDER — ESCITALOPRAM OXALATE 5 MG/1
5 TABLET ORAL DAILY
Qty: 30 TABLET | Refills: 1 | Status: SHIPPED | OUTPATIENT
Start: 2021-05-18 | End: 2021-05-19

## 2021-05-18 RX ORDER — SENNOSIDES 8.6 MG
650 CAPSULE ORAL 2 TIMES DAILY
Qty: 60 TABLET | Refills: 1 | Status: SHIPPED | OUTPATIENT
Start: 2021-05-18

## 2021-05-18 RX ORDER — METOPROLOL SUCCINATE 25 MG/1
75 TABLET, EXTENDED RELEASE ORAL 2 TIMES DAILY
Refills: 0
Start: 2021-05-18 | End: 2021-05-20

## 2021-05-18 RX ORDER — GLIMEPIRIDE 2 MG/1
2 TABLET ORAL 2 TIMES DAILY
Qty: 60 TABLET | Refills: 1 | Status: SHIPPED | OUTPATIENT
Start: 2021-05-18 | End: 2021-08-14

## 2021-05-18 NOTE — ASSESSMENT & PLAN NOTE
right foot cellulitis with osteomyelitis and hardware infection  Patient initially status post screw fixation of the right foot on 03/04/2021 with operative bone scraping culture growing Enterobacter  Patient   Was maintained on oral doxycycline since that procedure  Now most recently patient is status post flap closure with wound dehiscence and exposed hardware followed by I and D with removal of the last Screw  OR findings were significant for osteomyelitis and deep infection  Wound culture grew Enterobacter  Operative culture with growth of Enterobacter and Enterococcus  Patient discharged home to complete a 6 week course of high-dose IV cefepime with ampicillin  Through 06/08/2021  patient tolerating the antibiotic without difficulty  Labs are satisfactory  He continues to follow-up with Plastic surgery who was reportedly pleased with his progress  Plan:  -   Continue cefepime 2 g IV every 12 hours as ordered through 06/08/2021  -   Continue ampicillin 2 g IV every 6 hours as ordered through 06/08/2021  -   Continue weekly CBC with diff and creatinine  -   Continue local wound care and follow up with plastics    -    Return to office in 2 weeks for follow-up

## 2021-05-18 NOTE — PATIENT INSTRUCTIONS
Continue cefepime 2 grams IV every 12 hours  Continue ampicillin 2 grams IV every 6 hours  Continue weekly labs  Continue local wound care and follow up with Plastics  RTO in 2 weeks

## 2021-05-18 NOTE — TELEPHONE ENCOUNTER
Pt is getting IV abx therapy from [address] [facility]  At [facility] today pt stated he had about a day worth left of abx and wasn't sure how he was going to get it  Pt has [facility] coming to draw labs and PICC dressing  [facility] stated they were not providing abx  Spoke with Janeth Remy at [facility] he spoke with pt wife this AM and medication is scheduled to be delivered tomorrow by [employer]  Called and lm for pt regarding medication and delivery

## 2021-05-18 NOTE — PROGRESS NOTES
Assessment/Plan:    Acute osteomyelitis of right ankle or foot (HCC)  right foot cellulitis with osteomyelitis and hardware infection  Patient initially status post screw fixation of the right foot on 03/04/2021 with operative bone scraping culture growing Enterobacter  Patient   Was maintained on oral doxycycline since that procedure  Now most recently patient is status post flap closure with wound dehiscence and exposed hardware followed by I and D with removal of the last Screw  OR findings were significant for osteomyelitis and deep infection  Wound culture grew Enterobacter  Operative culture with growth of Enterobacter and Enterococcus  Patient discharged home to complete a 6 week course of high-dose IV cefepime with ampicillin  Through 06/08/2021  patient tolerating the antibiotic without difficulty  Labs are satisfactory  He continues to follow-up with Plastic surgery who was reportedly pleased with his progress  Plan:  -   Continue cefepime 2 g IV every 12 hours as ordered through 06/08/2021  -   Continue ampicillin 2 g IV every 6 hours as ordered through 06/08/2021  -   Continue weekly CBC with diff and creatinine  -   Continue local wound care and follow up with plastics  -    Return to office in 2 weeks for follow-up       Diagnoses and all orders for this visit:    Type 2 diabetes mellitus with diabetic neuropathy, without long-term current use of insulin ([address] )    S/P transmetatarsal amputation of foot, right (HCC)    Acute osteomyelitis of right ankle or foot ([address] )    Other orders  -     QUEtiapine (SEROquel) 25 mg tablet; Take 12 5 mg by mouth daily at bedtime Pt states only taking 12 5 mg          Subjective:      Patient ID: Ray Kim  is a 73 y o  male  HPI  82-year-old male presents for office follow-up today regarding right foot cellulitis with osteomyelitis and hardware infection    Patient initially status post screw fixation of the right foot on 03/04/2021 with operative bone scraping culture growing Enterobacter  Patient   Was maintained on oral doxycycline since that procedure  Now most recently patient is status post flap closure with wound dehiscence and exposed hardware followed by I and D with removal of the last Screw  OR findings were significant for osteomyelitis and deep infection  Wound culture grew Enterobacter  Operative culture with growth of Enterobacter and Enterococcus  Patient discharged home to complete a 6 week course of high-dose IV cefepime with ampicillin  Patient states he is tolerating the antibiotic without difficulty  He states his PICC line is functioning well  He denies missing any doses  States he recently followed up with Plastic surgery who is happy with his Flap  The following portions of the patient's history were reviewed and updated as appropriate: allergies, current medications, past family history, past medical history, past social history, past surgical history and problem list     Review of Systems   Constitutional: Negative for chills and fever  Respiratory: Negative for cough and shortness of breath  Gastrointestinal: Negative for abdominal pain, diarrhea, nausea and vomiting  Skin: Positive for wound  Negative for rash  Psychiatric/Behavioral: Negative for behavioral problems and confusion  Objective:      /60 (BP Location: Left arm, Patient Position: Sitting, Cuff Size: Adult)   Pulse 86   Temp (!) 97 3 °F (36 3 °C) (Temporal)   Resp 16   Ht 6' 5" (1 956 m) Comment: stated  BMI 25 50 kg/m²          Physical Exam  Vitals signs reviewed  Constitutional:       General: He is not in acute distress  Appearance: Normal appearance  He is not ill-appearing or diaphoretic  HENT:      Head: Normocephalic and atraumatic  Eyes:      General: No scleral icterus  Right eye: No discharge  Left eye: No discharge        Conjunctiva/sclera: Conjunctivae normal    Cardiovascular: Rate and Rhythm: Normal rate and regular rhythm  Pulmonary:      Effort: Pulmonary effort is normal  No respiratory distress  Breath sounds: Normal breath sounds  No stridor  No wheezing, rhonchi or rales  Chest:      Chest wall: No tenderness  Abdominal:      General: Bowel sounds are normal  There is no distension  Palpations: Abdomen is soft  Tenderness: There is no abdominal tenderness  Musculoskeletal:      Comments:  Right foot flap  Appears viable   no obvious erythema or drainage noted   Skin:     General: Skin is warm and dry  Coloration: Skin is not jaundiced or pale  Findings: No erythema or rash  Comments:  PICC insertion site without erythema drainage or tenderness   Neurological:      General: No focal deficit present  Mental Status: He is alert and oriented to person, place, and time     Psychiatric:         Mood and Affect: Mood normal          Behavior: Behavior normal         Labs:    05/17/2021   WBC 7 83   hemoglobin 11 3   platelets 051   creatinine 0 74

## 2021-05-18 NOTE — PROGRESS NOTES
TCM Call (since 4/17/2021)     Date and time call was made  5/7/2021  3:32 PM    Hospital care reviewed  Records reviewed    Patient was hospitialized at  [address]  went to [facility] rehab 4/30/21-5/12/21    Date of Admission  04/07/21    Date of discharge  04/30/21    Diagnosis  Right foot amputation    Disposition  Rehabilitation center    Were the patients medications reviewed and updated  Yes    Current Symptoms  -- (Comment)  infection of foot, taking antibiotics      TCM Call (since 4/17/2021)     Post hospital issues  -- (Comment)  Rehab     Should patient be enrolled in anticoag monitoring? No    Scheduled for follow up?   Yes    Patients specialists  Other (comment)    Other specialists names  Wound Care, Infectious Disease, Podiatry    Did you obtain your prescribed medications  Yes    Do you need help managing your prescriptions or medications  No    Is transportation to your appointment needed  No    I have advised the patient to call PCP with any new or worsening symptoms  Verónica Sin MA

## 2021-05-18 NOTE — PROGRESS NOTES
Subjective:   Chief Complaint   Patient presents with    Transition of Care Management        Patient ID: Arina Laird  is a 73 y o  male  Presents today for a post hospital follow-up  He was admitted on April 7th with subsequent amputation of all metatarsals  He did have some subsequent surgeries to remove infected subcutaneous tissue which was replaced from donor sites on thigh with subsequent vein replacement and skin graft  He currently has a PICC line that he is receiving IV antibiotics for a infection and is in followed by infectious disease  He did have an appointment this a m  with ID as well  He is followed by Plastic surgery and has an appointment for follow-up on May 20th  He is also being followed by Podiatry  for wound care and needs an appointment at this date  He was discharged the hospital on April 30th to [address] [facility] for rehab  He was discharged from [address] [facility] on  May 12th  During his stay at [address] [facility] she was experiencing some chest discomfort and was seen by Cardiology in [place]  Cardiologist did drop his diltiazem from 180 mg twice daily to 90 mg twice daily  He was also to be scheduled for a stress test but patient wanted to be seen here in the [facility] not in [place]  Will refer for a cardiology assessment as well  The following portions of the patient's history were reviewed and updated as appropriate: allergies, current medications, past family history, past medical history, past social history, past surgical history and problem list     Review of Systems   Constitutional: Negative for chills, fatigue and fever  Respiratory: Negative for cough and shortness of breath  Cardiovascular: Negative for chest pain, palpitations and leg swelling  Musculoskeletal: Positive for gait problem (secondary to right foot amputation)  Skin: Positive for wound (S/P right metatarsal amputation)     Psychiatric/Behavioral: Negative for dysphoric mood  The patient is not nervous/anxious  Objective:  Vitals:    05/18/21 1323   BP: 140/70   BP Location: Left arm   Patient Position: Sitting   Cuff Size: Large   Pulse: 86   Resp: 16   Temp: 98 9 °F (37 2 °C)   TempSrc: Tympanic   SpO2: 98%   Height: 6' 5" (1 956 m)      Physical Exam  Vitals signs reviewed  Constitutional:       Appearance: Normal appearance  HENT:      Head: Normocephalic  Neck:      Musculoskeletal: Normal range of motion and neck supple  Vascular: No carotid bruit  Cardiovascular:      Rate and Rhythm: Normal rate and regular rhythm  Pulses:           Posterior tibial pulses are 2+ on the left side  Heart sounds: Normal heart sounds  Comments: Right DP unable to palpate due to wound dressing  Pulmonary:      Effort: Pulmonary effort is normal       Breath sounds: Normal breath sounds  No rales  Lymphadenopathy:      Cervical: No cervical adenopathy  Skin:     General: Skin is warm  Capillary Refill: Capillary refill takes less than 2 seconds  Comments: Right foot with intact dressing non soiled   Neurological:      Mental Status: He is alert and oriented to person, place, and time  Psychiatric:         Mood and Affect: Mood normal          Speech: Speech normal          Behavior: Behavior normal          Cognition and Memory: Cognition is impaired  Assessment/Plan:    No problem-specific Assessment & Plan notes found for this encounter  Diagnoses and all orders for this visit:    S/P transmetatarsal amputation of foot, right (HCC)  -     gabapentin (NEURONTIN) 100 mg capsule; Take 1 capsule (100 mg total) by mouth 3 (three) times a day  -     escitalopram (LEXAPRO) 5 mg tablet; Take 1 tablet (5 mg total) by mouth daily    Deep disruption or dehiscence of operation wound, subsequent encounter  -     gabapentin (NEURONTIN) 100 mg capsule;  Take 1 capsule (100 mg total) by mouth 3 (three) times a day  - metoprolol succinate (TOPROL-XL) 25 mg 24 hr tablet; Take 3 tablets (75 mg total) by mouth 2 (two) times a day    Type 2 diabetes mellitus with diabetic neuropathy, without long-term current use of insulin ([facility])  -     Cancel: POCT hemoglobin A1c  -     glimepiride (AMARYL) 2 mg tablet; Take 1 tablet (2 mg total) by mouth 2 (two) times a day    Diabetic ulcer of right 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, with fat layer exposed ([facility])  -     acetaminophen (Mapap Arthritis Pain) 650 mg CR tablet; Take 1 tablet (650 mg total) by mouth 2 (two) times a day    Atrial fibrillation with RVR ([facility])  -     diltiazem (CARDIZEM) 90 mg tablet; Take 1 tablet (90 mg total) by mouth 2 (two) times a day  -     Ambulatory referral to Cardiology;  Future

## 2021-05-19 ENCOUNTER — TREATMENT (OUTPATIENT)
Dept: FAMILY MEDICINE CLINIC | Facility: CLINIC | Age: 71
End: 2021-05-19

## 2021-05-20 ENCOUNTER — OFFICE VISIT (OUTPATIENT)
Dept: PLASTIC SURGERY | Facility: CLINIC | Age: 71
End: 2021-05-20

## 2021-05-20 ENCOUNTER — TELEPHONE (OUTPATIENT)
Dept: OTHER | Facility: OTHER | Age: 71
End: 2021-05-20

## 2021-05-20 DIAGNOSIS — Z89.431 S/P TRANSMETATARSAL AMPUTATION OF FOOT, RIGHT (HCC): Primary | ICD-10-CM

## 2021-05-20 DIAGNOSIS — Z98.890 S/P FLAP GRAFT: ICD-10-CM

## 2021-05-20 DIAGNOSIS — E11.40 TYPE 2 DIABETES MELLITUS WITH DIABETIC NEUROPATHY, WITHOUT LONG-TERM CURRENT USE OF INSULIN (HCC): Primary | Chronic | ICD-10-CM

## 2021-05-20 DIAGNOSIS — I48.91 ATRIAL FIBRILLATION WITH RVR (HCC): Primary | ICD-10-CM

## 2021-05-20 PROCEDURE — 99024 POSTOP FOLLOW-UP VISIT: CPT | Performed by: PHYSICIAN ASSISTANT

## 2021-05-20 RX ORDER — METOPROLOL SUCCINATE 25 MG/1
TABLET, EXTENDED RELEASE ORAL
Qty: 42 TABLET | Refills: 0 | Status: SHIPPED | OUTPATIENT
Start: 2021-05-20 | End: 2021-05-21 | Stop reason: SDUPTHER

## 2021-05-20 NOTE — TELEPHONE ENCOUNTER
Patient called in for refill of Metoprolol  Sent 7 day supply  Patient will call the office for a full refill

## 2021-05-20 NOTE — PROGRESS NOTES
POST-OP EVALUATION  5/20/2021    Pratima Delacruz  is a 73 y o  male is here today for routine post-operative follow up  He is s/p DEBRIDEMENT LOWER EXTREMITY ([address] OUT) (Right Foot)       RECONSTRUCTION MICROSURGICAL W/ FREE FLAP (Right Foot)    Pt and his wife feel he is progressing, albeit slowly      Past Medical History:   Diagnosis Date    Arthritis     Atrial fibrillation ([facility] [address]     Diagnosed 11/2018    Benign prostate hyperplasia 04/2002    Cellulitis     right lower leg     Colon polyp 2006    Diabetes mellitus ([address] )     Hearing aid worn     bilat    Hearing loss     90% loss left ear and 40% right ear    History of clubfoot     "since birth"    History of pneumonia     History of TIA (transient ischemic attack) 04/25/2017 11/30/18 pt denies    Hyperlipidemia 5/15/2014    Hypertension     Infectious viral hepatitis     "cant remember type"    Irregular heart beat     Neuropathy     both feet    Osteomyelitis ([facility] [address] [facility]     right great toe    Right middle lobe pulmonary nodule 11/6/2018    Seasickness     Teeth missing     Type 2 diabetes mellitus with diabetic neuropathy ([facility] [address] ) 11/6/2018    Wears glasses     reading    Wound, open     bottom of right foot     Past Surgical History:   Procedure Laterality Date    BUNIONECTOMY Right 12/4/2018    Procedure: 5TH METATARSAL BONE PARTIAL RESECTION, FULL THICKNESS DEBRIDEMENT OF DIABETIC ULCER;  Surgeon: Lorena Suarez DPM;  [facility];  Service: Podiatry    CLUB FOOT RELEASE Bilateral     COLONOSCOPY      COMPLEX WOUND CLOSURE TO EXTREMITY  4/27/2021    Procedure: COMPLEX WOUND CLOSURE TO EXTREMITY: RIGHT FOOT;  Surgeon: Laquita Carr MD;  [facility];  Service: Plastics    EXTERNAL FIXATOR APPLICATION Right 7/12/1285    Procedure: Application multiplane external fixation;  Surgeon: Zenaida Edge DPM;  [facility];  Service: Podiatry    FOOT HARDWARE REMOVAL Right 2/17/2021    Procedure: REMOVAL EXTERNAL FIXATOR;  Surgeon: Guero Golden DPM;  [facility];  Service: Podiatry    FOREIGN BODY REMOVAL Right 4/27/2021    Procedure: REMOVAL FOREIGN BODY EXTREMITY: RIGHT FOOT;  Surgeon: Lili Madrid MD;  [facility];  Service: Plastics    INCISION AND DRAINAGE OF WOUND Right 2/17/2021    Procedure: INCISION AND DRAINAGE (I&D) EXTREMITY;  Surgeon: Guero Golden DPM;  [facility];  Service: Podiatry    ORIF FOOT FRACTURE Right 3/4/2021    Procedure: VAC PLACEMENT; SCREW FIXATION RIGHT FOOT FUSION;  Surgeon: Guero Golden DPM;  [facility];  Service: Podiatry    [address] Right 2/22/2021    Procedure: AMPUTATION TRANSMETATARSAL (TMA), REMOVAL NAIL IM T2 ICF HARDWARE;  Surgeon: Guero Golden DPM;  [facility];  Service: Podiatry    SD FUSION FOOT BONES,MIDTARSAL,OSTEOTMY Right 2/12/2021    Procedure: foot ARTHRODESIS/FUSION;  Surgeon: Guero Golden DPM;  [facility];  Service: Podiatry    SD LENGTH/SHORT LEG/ANKL TENDON,SINGLE Right 2/12/2021    Procedure: LENGTHEN TIBIAL TENDON, TRANS HEEL CORD;  Surgeon: Guero Golden DPM;  [facility];  Service: Podiatry    SD MUSCLE-SKIN FLAP,TRUNK Right 4/12/2021    Procedure: RECONSTRUCTION MICROSURGICAL W/ FREE FLAP;  Surgeon: Lili Madrid MD;  [facility];  Service: Plastics    SD MUSCLE-SKIN FLAP,TRUNK Right 4/12/2021    Procedure: TAKEBACK RECONSTRUCTION MICROSURGICAL W/ FREE FLAP;  Surgeon: Lili Madrid MD;  [facility];  Service: Plastics    SD MUSCLE-SKIN FLAP,TRUNK Right 4/13/2021    Procedure: RECONSTRUCTION MICROSURGICAL W/ FREE FLAP, RE EXPLORATION, MICRO VASCULAR REVISION;  Surgeon: Lili Madrid MD;  [facility];  Service: Plastics    SD PART [address] BONE METATARSAL HEAD,EA Right 12/29/2020    Procedure: EXCISION EXOSTOSIS;  Surgeon: Guero Golden DPM;  [facility];  Service: Podiatry    WI SECD CLOS SURG WND EXTEN/COMPLIC Right 76/16/7839    Procedure: PRIMARY DELAYED CLOSURE;  Surgeon: Alyson Kwan DPM;  [facility];  Service: Podiatry    TOE AMPUTATION Right     partial great toe    TONSILLECTOMY      VAC DRESSING APPLICATION Right 4/4/5555    Procedure: APPLICATION VAC DRESSING EXTREMITY;  Surgeon: Nicola Mcgregor MD;  [facility];  Service: Plastics    [address] Right 3/1/2021    Procedure: DEBRIDEMENT LOWER EXTREMITY ([address] OUT); Surgeon: Nicola Mcgregor MD;  [facility];  Service: Plastics    WOUND DEBRIDEMENT Right 4/7/2021    Procedure: DEBRIDEMENT RIGHT FOOT;  Surgeon: Nicola Mcgregor MD;  [facility];  Service: Plastics    WOUND DEBRIDEMENT Right 4/12/2021    Procedure: DEBRIDEMENT LOWER EXTREMITY [facility] OUT);   Surgeon: Nicola Mcgregor MD;  [facility];  Service: Plastics    WOUND DEBRIDEMENT Right 4/27/2021    Procedure: DEBRIDEMENT LOWER EXTREMITY ([address]): RIGHT FOOT;  Surgeon: Nicola Mcgregor MD;  [facility];  Service: Plastics        Current Outpatient Medications:     acetaminophen (Mapap Arthritis Pain) 650 mg CR tablet, Take 1 tablet (650 mg total) by mouth 2 (two) times a day, Disp: 60 tablet, Rfl: 1    ampicillin 2,000 mg in sodium chloride 0 9 % 100 mL IVPB, Infuse 2,000 mg into a venous catheter every 6 (six) hours, Disp: , Rfl: 0    aspirin 325 mg tablet, Take 1 tablet (325 mg total) by mouth daily, Disp:  , Rfl: 0    atorvastatin (LIPITOR) 40 mg tablet, Take 1 tablet (40 mg total) by mouth daily, Disp: 90 tablet, Rfl: 3    Blood Glucose Monitoring Suppl (ACCU-CHEK JYOTI SMARTVIEW) w/Device KIT, use to check Blood Sugar as needed, Disp: , Rfl:     cefepime 2,000 mg in dextrose 5 % 50 mL IVPB, Infuse 2,000 mg into a venous catheter every 12 (twelve) hours, Disp: , Rfl: 0    diltiazem (CARDIZEM) 90 mg tablet, Take 1 tablet (90 mg total) by mouth 2 (two) times a day, Disp: 60 tablet, Rfl: 1    docusate sodium (COLACE) 100 mg capsule, Take 1 capsule (100 mg total) by mouth 2 (two) times a day, Disp: 10 capsule, Rfl: 0    Eliquis 5 MG, Take 1 tablet (5 mg total) by mouth 2 (two) times a day, Disp: 60 tablet, Rfl: 2    glimepiride (AMARYL) 2 mg tablet, Take 1 tablet (2 mg total) by mouth 2 (two) times a day, Disp: 60 tablet, Rfl: 1    glucose blood (Accu-Chek SmartView) test strip, Use as instructed BID, Disp: 200 each, Rfl: 1    Januvia 100 MG tablet, Take 1 tablet (100 mg total) by mouth daily, Disp: 132 tablet, Rfl: 3    lisinopril (ZESTRIL) 40 mg tablet, Take 1 tablet (40 mg total) by mouth daily, Disp: 90 tablet, Rfl: 3    melatonin 3 mg, Take 1 tablet (3 mg total) by mouth daily at bedtime, Disp: , Rfl: 0    metFORMIN (GLUCOPHAGE) 500 mg tablet, Take 500 mg by mouth 2 (two) times a day with meals, Disp: , Rfl:     metoprolol succinate (TOPROL-XL) 25 mg 24 hr tablet, Take 3 tablets (75 mg total) by mouth 2 (two) times a day, Disp: , Rfl: 0    polyethylene glycol (MIRALAX) 17 g packet, Take 17 g by mouth daily, Disp:  , Rfl: 0    Sodium Chloride Flush (NORMAL SALINE FLUSH IV), Infuse 10 mL into a venous catheter 4 (four) times a day, Disp: , Rfl:   Allergies: Simvastatin and Itraconazole    Objective    Flap is well perfused  Skin grafts are well established  3-4 open areas along the flap edge with fibrinous exudate  No sign of purulence  Assessment/Plan   Diagnoses and all orders for this visit:    S/P transmetatarsal amputation of foot, right ([address] )    S/P R ALT flap to right foot      M,W,F  0  Remove dressing and packing strips  1  Wash with soap and water  2  Replace with new iodoform packing strip  3  Bacitracin to remainder of flap edge and skin graft  Followup with us in 2 weeks    Also make appt with Dr Adams Murray, [place]

## 2021-05-21 ENCOUNTER — TELEPHONE (OUTPATIENT)
Dept: FAMILY MEDICINE CLINIC | Facility: CLINIC | Age: 71
End: 2021-05-21

## 2021-05-21 DIAGNOSIS — I48.91 ATRIAL FIBRILLATION WITH RVR (HCC): ICD-10-CM

## 2021-05-21 NOTE — TELEPHONE ENCOUNTER
A script was sent to [id number] but I called them when the visiting nurse said it wasn't there and she said they never received it    Please resend Metropolol to [facility]

## 2021-05-24 ENCOUNTER — APPOINTMENT (OUTPATIENT)
Dept: LAB | Age: 71
End: 2021-05-24
Payer: COMMERCIAL

## 2021-05-24 LAB
ANION GAP SERPL CALCULATED.3IONS-SCNC: 5 MMOL/L (ref 4–13)
BASOPHILS # BLD AUTO: 0.06 THOUSANDS/ΜL (ref 0–0.1)
BASOPHILS NFR BLD AUTO: 1 % (ref 0–1)
BUN SERPL-MCNC: 16 MG/DL (ref 5–25)
CALCIUM SERPL-MCNC: 9.7 MG/DL (ref 8.3–10.1)
CHLORIDE SERPL-SCNC: 108 MMOL/L (ref 100–108)
CO2 SERPL-SCNC: 27 MMOL/L (ref 21–32)
CREAT SERPL-MCNC: 0.72 MG/DL (ref 0.6–1.3)
EOSINOPHIL # BLD AUTO: 0.59 THOUSAND/ΜL (ref 0–0.61)
EOSINOPHIL NFR BLD AUTO: 7 % (ref 0–6)
ERYTHROCYTE [DISTWIDTH] IN BLOOD BY AUTOMATED COUNT: 15.9 % (ref 11.6–15.1)
GFR SERPL CREATININE-BSD FRML MDRD: 95 ML/MIN/1.73SQ M
GLUCOSE SERPL-MCNC: 153 MG/DL (ref 65–140)
HCT VFR BLD AUTO: 39.2 % (ref 36.5–49.3)
HGB BLD-MCNC: 12 G/DL (ref 12–17)
IMM GRANULOCYTES # BLD AUTO: 0.03 THOUSAND/UL (ref 0–0.2)
IMM GRANULOCYTES NFR BLD AUTO: 0 % (ref 0–2)
LYMPHOCYTES # BLD AUTO: 1.68 THOUSANDS/ΜL (ref 0.6–4.47)
LYMPHOCYTES NFR BLD AUTO: 21 % (ref 14–44)
MCH RBC QN AUTO: 30.2 PG (ref 26.8–34.3)
MCHC RBC AUTO-ENTMCNC: 30.6 G/DL (ref 31.4–37.4)
MCV RBC AUTO: 99 FL (ref 82–98)
MONOCYTES # BLD AUTO: 0.68 THOUSAND/ΜL (ref 0.17–1.22)
MONOCYTES NFR BLD AUTO: 8 % (ref 4–12)
NEUTROPHILS # BLD AUTO: 5.14 THOUSANDS/ΜL (ref 1.85–7.62)
NEUTS SEG NFR BLD AUTO: 63 % (ref 43–75)
NRBC BLD AUTO-RTO: 0 /100 WBCS
PLATELET # BLD AUTO: 248 THOUSANDS/UL (ref 149–390)
PMV BLD AUTO: 11.3 FL (ref 8.9–12.7)
POTASSIUM SERPL-SCNC: 4.4 MMOL/L (ref 3.5–5.3)
RBC # BLD AUTO: 3.98 MILLION/UL (ref 3.88–5.62)
SODIUM SERPL-SCNC: 140 MMOL/L (ref 136–145)
WBC # BLD AUTO: 8.18 THOUSAND/UL (ref 4.31–10.16)

## 2021-05-24 PROCEDURE — 36415 COLL VENOUS BLD VENIPUNCTURE: CPT | Performed by: PHYSICIAN ASSISTANT

## 2021-05-24 PROCEDURE — 85025 COMPLETE CBC W/AUTO DIFF WBC: CPT | Performed by: PHYSICIAN ASSISTANT

## 2021-05-24 PROCEDURE — 80048 BASIC METABOLIC PNL TOTAL CA: CPT | Performed by: PHYSICIAN ASSISTANT

## 2021-05-24 RX ORDER — METOPROLOL SUCCINATE 25 MG/1
TABLET, EXTENDED RELEASE ORAL
Qty: 42 TABLET | Refills: 0 | Status: SHIPPED | OUTPATIENT
Start: 2021-05-24 | End: 2021-06-07 | Stop reason: SDUPTHER

## 2021-05-27 ENCOUNTER — IMMUNIZATIONS (OUTPATIENT)
Dept: FAMILY MEDICINE CLINIC | Facility: HOSPITAL | Age: 71
End: 2021-05-27

## 2021-05-27 DIAGNOSIS — Z23 ENCOUNTER FOR IMMUNIZATION: Primary | ICD-10-CM

## 2021-05-27 PROCEDURE — 91300 SARS-COV-2 / COVID-19 MRNA VACCINE (PFIZER-BIONTECH) 30 MCG: CPT

## 2021-05-27 PROCEDURE — 0002A SARS-COV-2 / COVID-19 MRNA VACCINE (PFIZER-BIONTECH) 30 MCG: CPT

## 2021-05-31 ENCOUNTER — LAB REQUISITION (OUTPATIENT)
Dept: LAB | Facility: HOSPITAL | Age: 71
End: 2021-05-31
Payer: COMMERCIAL

## 2021-05-31 DIAGNOSIS — B96.89 OTHER SPECIFIED BACTERIAL AGENTS AS THE CAUSE OF DISEASES CLASSIFIED ELSEWHERE: ICD-10-CM

## 2021-05-31 DIAGNOSIS — R78.81 BACTEREMIA: ICD-10-CM

## 2021-05-31 LAB
ANION GAP SERPL CALCULATED.3IONS-SCNC: 10 MMOL/L (ref 4–13)
BASOPHILS # BLD AUTO: 0.05 THOUSANDS/ΜL (ref 0–0.1)
BASOPHILS NFR BLD AUTO: 1 % (ref 0–1)
BUN SERPL-MCNC: 15 MG/DL (ref 5–25)
CALCIUM SERPL-MCNC: 8.9 MG/DL (ref 8.3–10.1)
CHLORIDE SERPL-SCNC: 107 MMOL/L (ref 100–108)
CO2 SERPL-SCNC: 28 MMOL/L (ref 21–32)
CREAT SERPL-MCNC: 0.94 MG/DL (ref 0.6–1.3)
EOSINOPHIL # BLD AUTO: 0.54 THOUSAND/ΜL (ref 0–0.61)
EOSINOPHIL NFR BLD AUTO: 7 % (ref 0–6)
ERYTHROCYTE [DISTWIDTH] IN BLOOD BY AUTOMATED COUNT: 15.5 % (ref 11.6–15.1)
GFR SERPL CREATININE-BSD FRML MDRD: 82 ML/MIN/1.73SQ M
GLUCOSE SERPL-MCNC: 151 MG/DL (ref 65–140)
HCT VFR BLD AUTO: 39.8 % (ref 36.5–49.3)
HGB BLD-MCNC: 12 G/DL (ref 12–17)
IMM GRANULOCYTES # BLD AUTO: 0.02 THOUSAND/UL (ref 0–0.2)
IMM GRANULOCYTES NFR BLD AUTO: 0 % (ref 0–2)
LYMPHOCYTES # BLD AUTO: 1.84 THOUSANDS/ΜL (ref 0.6–4.47)
LYMPHOCYTES NFR BLD AUTO: 24 % (ref 14–44)
MCH RBC QN AUTO: 29.3 PG (ref 26.8–34.3)
MCHC RBC AUTO-ENTMCNC: 30.2 G/DL (ref 31.4–37.4)
MCV RBC AUTO: 97 FL (ref 82–98)
MONOCYTES # BLD AUTO: 0.75 THOUSAND/ΜL (ref 0.17–1.22)
MONOCYTES NFR BLD AUTO: 10 % (ref 4–12)
NEUTROPHILS # BLD AUTO: 4.36 THOUSANDS/ΜL (ref 1.85–7.62)
NEUTS SEG NFR BLD AUTO: 58 % (ref 43–75)
NRBC BLD AUTO-RTO: 0 /100 WBCS
PLATELET # BLD AUTO: 263 THOUSANDS/UL (ref 149–390)
PMV BLD AUTO: 10.7 FL (ref 8.9–12.7)
POTASSIUM SERPL-SCNC: 4.5 MMOL/L (ref 3.5–5.3)
RBC # BLD AUTO: 4.1 MILLION/UL (ref 3.88–5.62)
SODIUM SERPL-SCNC: 145 MMOL/L (ref 136–145)
WBC # BLD AUTO: 7.56 THOUSAND/UL (ref 4.31–10.16)

## 2021-05-31 PROCEDURE — 80048 BASIC METABOLIC PNL TOTAL CA: CPT | Performed by: PHYSICIAN ASSISTANT

## 2021-05-31 PROCEDURE — 85025 COMPLETE CBC W/AUTO DIFF WBC: CPT | Performed by: PHYSICIAN ASSISTANT

## 2021-06-03 ENCOUNTER — OFFICE VISIT (OUTPATIENT)
Dept: INFECTIOUS DISEASES | Facility: CLINIC | Age: 71
End: 2021-06-03
Payer: COMMERCIAL

## 2021-06-03 VITALS
DIASTOLIC BLOOD PRESSURE: 74 MMHG | OXYGEN SATURATION: 94 % | TEMPERATURE: 97.4 F | HEART RATE: 66 BPM | SYSTOLIC BLOOD PRESSURE: 130 MMHG

## 2021-06-03 DIAGNOSIS — M86.171 ACUTE OSTEOMYELITIS OF RIGHT ANKLE OR FOOT (HCC): Primary | ICD-10-CM

## 2021-06-03 DIAGNOSIS — E11.40 TYPE 2 DIABETES MELLITUS WITH DIABETIC NEUROPATHY, WITHOUT LONG-TERM CURRENT USE OF INSULIN (HCC): Chronic | ICD-10-CM

## 2021-06-03 DIAGNOSIS — E11.621 DIABETIC ULCER OF RIGHT MIDFOOT ASSOCIATED WITH TYPE 2 DIABETES MELLITUS, WITH FAT LAYER EXPOSED (HCC): ICD-10-CM

## 2021-06-03 DIAGNOSIS — Z89.431 S/P TRANSMETATARSAL AMPUTATION OF FOOT, RIGHT (HCC): ICD-10-CM

## 2021-06-03 DIAGNOSIS — L97.412 DIABETIC ULCER OF RIGHT MIDFOOT ASSOCIATED WITH TYPE 2 DIABETES MELLITUS, WITH FAT LAYER EXPOSED (HCC): ICD-10-CM

## 2021-06-03 DIAGNOSIS — T81.32XD DEEP DISRUPTION OR DEHISCENCE OF OPERATION WOUND, SUBSEQUENT ENCOUNTER: ICD-10-CM

## 2021-06-03 DIAGNOSIS — R26.2 AMBULATORY DYSFUNCTION: ICD-10-CM

## 2021-06-03 PROCEDURE — 99214 OFFICE O/P EST MOD 30 MIN: CPT | Performed by: INTERNAL MEDICINE

## 2021-06-03 NOTE — PROGRESS NOTES
The [facility] are Progress Note - Infectious Disease   Leah Ramon  73 y o  male MRN: [id number]  Unit/Bed#:  Encounter: [id number]      Impression/Plan:    1  Right foot cellulitis and osteomyelitis with hardware infection:   Patient is status post screw fixation right foot on 03/04/21, with operative bone scraping culture growing Enterobacter   Patient received doxycycline p o , then underwent  flap closure with wound dehiscence and exposed hardware and again grew Enterobacter   At that time his antibiotic was switched to cefepime  He had  I&D and removal of the last screw, with operative findings of osteomyelitis and deep infection   Wound culture with growth of Enterobacter   However, operative culture with growth of Enterobacter and Enterococcus  Patient is currently on cefepime IV and ampicillin  CRP 05/10/2021 is less than 3  Wbc normal    Blood work from 05/31/2021 reviewed   Wound today shows no purulent drainage, no erythema, no tenderness  Wound is actually healing well and shows improvement compared to images available in media section and reviewed today  Continue high-dose IV cefepime/ampicillin to finish 6 weeks course through 06/08/2021  Remove PICC line after last dose of IV antibiotic is given     2  Open right foot wound   Patient is status post flap closure, requiring return to OR twice for revision due to congestion   Wound has dehisced, most likely secondary to underlying deep infection   Patient is status post I&D and wound closure  All hardware now removed  Patient is status post S/P transmetatarsal amputation of right foot and flap  Antibiotic plan as in above  Wound care per Podiatry and Plastic surgery    Less evaluation by Plastic surgery team on 05/20/2021 reviewed     3  Status post screw fixation right foot on 03/04   Hardware removed on 04/27/2021     4  Status post failed right foot reconstruction, requiring TMA   All hardware prior to TMA were removed   However, as in above, patient is status post screw fixation of right foot after TMA        5  DM type 2:  Last A1c 7 9%  Patient to continue follow-up with PCP for management of his diabetes     Discussed with patient in detail regarding the above plan      Antibiotics:  Ampicillin and cefepime IV  Six weeks course ending 06/08/2021    Subjective:  Patient has no fever, chills, sweats; no nausea, vomiting, diarrhea; no cough, shortness of breath; no pain  No new symptoms  2 loose BM over past 24h    ROS: A complete 12 point ROS is negative other than that noted in the HPI    Followup portions patient history reviewed and updated as: Allergies, current medications, past medical history, past social history, past surgical history, and the problem list    Objective:  Vitals:  Vitals:    06/03/21 1336   BP: 130/74   BP Location: Left arm   Patient Position: Sitting   Cuff Size: Standard   Pulse: 66   Temp: (!) 97 4 °F (36 3 °C)   TempSrc: Temporal   SpO2: 94%       Physical Exam:   General Appearance:  Alert, interactive, appearing well, nontoxic, no acute distress  Throat: Oropharynx moist without lesions  Lungs:   Clear to auscultation bilaterally; no audible wheezes, rhonchi or rales; respirations unlabored   Heart:  RRR; no murmur, rub or gallop   Abdomen:   Soft, non-tender, non-distended, positive bowel sounds  Extremities: No clubbing, cyanosis or edema  S/p right TMA  No erythema  No active drainage  small opening is packed  Skin: No new rashes or lesions          Labs, Imaging, & Other studies:   All pertinent labs and imaging studies were personally reviewed    Lab Results   Component Value Date     05/22/2018    K 4 5 05/31/2021     05/31/2021    CO2 28 05/31/2021    ANIONGAP 10 05/22/2018    BUN 15 05/31/2021    CREATININE 0 94 05/31/2021    GLUCOSE 282 (H) 04/13/2021    GLUF 214 (H) 11/16/2019    CALCIUM 8 9 05/31/2021    CORRECTEDCA 9 7 02/16/2021    AST 21 04/12/2021    ALT 59 04/12/2021    ALKPHOS 111 04/12/2021    PROT 7 0 05/22/2018    BILITOT 0 6 05/22/2018    EGFR 82 05/31/2021     Lab Results   Component Value Date    WBC 7 56 05/31/2021    HGB 12 0 05/31/2021    HCT 39 8 05/31/2021    MCV 97 05/31/2021     05/31/2021   No results found for: Marilyn Brady

## 2021-06-04 ENCOUNTER — OFFICE VISIT (OUTPATIENT)
Dept: WOUND CARE | Facility: HOSPITAL | Age: 71
End: 2021-06-04
Payer: COMMERCIAL

## 2021-06-04 VITALS
HEART RATE: 85 BPM | SYSTOLIC BLOOD PRESSURE: 151 MMHG | DIASTOLIC BLOOD PRESSURE: 80 MMHG | HEIGHT: 76 IN | BODY MASS INDEX: 28.37 KG/M2 | WEIGHT: 233 LBS | TEMPERATURE: 97.9 F | RESPIRATION RATE: 18 BRPM

## 2021-06-04 DIAGNOSIS — T81.32XD DEEP DISRUPTION OR DEHISCENCE OF OPERATION WOUND, SUBSEQUENT ENCOUNTER: ICD-10-CM

## 2021-06-04 DIAGNOSIS — Z47.81 AFTERCARE FOR AMPUTATION STUMP: ICD-10-CM

## 2021-06-04 PROCEDURE — 99213 OFFICE O/P EST LOW 20 MIN: CPT | Performed by: PODIATRIST

## 2021-06-04 PROCEDURE — G0463 HOSPITAL OUTPT CLINIC VISIT: HCPCS | Performed by: PODIATRIST

## 2021-06-04 PROCEDURE — 99214 OFFICE O/P EST MOD 30 MIN: CPT | Performed by: PODIATRIST

## 2021-06-04 RX ORDER — LIDOCAINE HYDROCHLORIDE 40 MG/ML
5 SOLUTION TOPICAL ONCE
Status: COMPLETED | OUTPATIENT
Start: 2021-06-04 | End: 2021-06-04

## 2021-06-04 RX ADMIN — LIDOCAINE HYDROCHLORIDE 5 ML: 40 SOLUTION TOPICAL at 10:42

## 2021-06-04 NOTE — PATIENT INSTRUCTIONS
Orders Placed This Encounter   Procedures    Wound negative pressure wound therapy     Right medial foot open area  Apply Wound VAC to MEDIAL open are ONLY  Negative Pressure Wound Therapy Instructions    Apply black granufoam to wound bed, Set negative pressure on continuous at 125 mmHG, VAC dressing to be changed three times per week as ordered  DO NOT bridge  Other: Drape margins 1-2 inches on MEDIAL aspect of foot ONLY (Dr Lili Malagon does not want VAC film uses in excess  It must be limited to medial open periwound ONLY)  Use stomahesive strip paste to assist with seal on suture line  Right lateral foot open area: Wash your hands with soap and water  Remove old dressing, discard into plastic bag and place in trash  Cleanse the wound with normal saline solution prior to applying a clean dressing  Do not use tissue or cotton balls  Do not scrub the wound  Pat dry using gauze  Shower no   Apply adaptic to the lateral foot wound  Cover with gauze 4x4 (NO VAC TO THIS AREA)  Secure with ava  Change dressing 3 times per week  UNTIL VAC IS OBTAINED:  Continue packing to medial open area as ordered previously  Home care to initiate [facility] when delivered to patient's home  Off-loading Instructions:    Wear off-loading device as directed by your physician  Put on immediately when rising in the morning and remove when going to bed  Wedge shoe      Follow up in 2 weeks     Standing Status:   Future     Standing Expiration Date:   6/4/2022

## 2021-06-04 NOTE — PROGRESS NOTES
Patient ID: Uvaldo Kirkland  is a 73 y o  male Date of Birth 1950     My role is Foot, Ankle and Wound Specialist    Chief Complaint   Patient presents with   [facility] Patient Visit     diabetic foot ulcer       TMA with free flap, wound dehisence    Subjective:   Ar Wallis is now home  He's mostly NWB  He had his free flap and hardware removal  He now has packing into the hardware removal site  Sutures intact to the flap  He's seeing Dr Cintia Cooper next week         The following portions of the patient's history were reviewed and updated as appropriate:   Patient Active Problem List   Diagnosis    Hyperlipidemia    Type 2 diabetes mellitus with diabetic neuropathy ([address] )    Atrial fibrillation with RVR ([address] )    Right middle lobe pulmonary nodule    Essential hypertension    Acquired absence of other right toe(s) ([address] )    Benign prostatic hyperplasia with lower urinary tract symptoms    Positive for microalbuminuria    Deep disruption or dehiscence of operation wound    Post-operative state    Aftercare for amputation stump    Ambulatory dysfunction    S/P transmetatarsal amputation of foot, right (HCC)    Sleep disturbance    Acute osteomyelitis of right ankle or foot ([address] )    Anxiety    S/P R ALT flap to right foot     Past Medical History:   Diagnosis Date    Arthritis     Atrial fibrillation ([address] )     Diagnosed 11/2018    Benign prostate hyperplasia 04/2002    Cellulitis     right lower leg     Colon polyp 2006    Diabetes mellitus ([address] )     Hearing aid worn     bilat    Hearing loss     90% loss left ear and 40% right ear    History of clubfoot     "since birth"    History of pneumonia     History of TIA (transient ischemic attack) 04/25/2017 11/30/18 pt denies    Hyperlipidemia 5/15/2014    Hypertension     Infectious viral hepatitis     "cant remember type"    Irregular heart beat     Neuropathy     both feet    Osteomyelitis ([address] )     right great toe    Right middle lobe pulmonary nodule 11/6/2018    Seasickness     Teeth missing     Type 2 diabetes mellitus with diabetic neuropathy ([facility] ) 11/6/2018    Wears glasses     reading    Wound, open     bottom of right foot     Past Surgical History:   Procedure Laterality Date    BUNIONECTOMY Right 12/4/2018    Procedure: 5TH METATARSAL BONE PARTIAL RESECTION, FULL THICKNESS DEBRIDEMENT OF DIABETIC ULCER;  Surgeon: aKi Mckay DPM;  [facility];  Service: Podiatry    CLUB FOOT RELEASE Bilateral     COLONOSCOPY      COMPLEX WOUND CLOSURE TO EXTREMITY  4/27/2021    Procedure: COMPLEX WOUND CLOSURE TO EXTREMITY: RIGHT FOOT;  Surgeon: Milan Singh MD;  [facility];  Service: Plastics    EXTERNAL FIXATOR APPLICATION Right 8/35/1511    Procedure: Application multiplane external fixation;  Surgeon: Polly Swan DPM;  [facility];  Service: Podiatry    FOOT HARDWARE REMOVAL Right 2/17/2021    Procedure: REMOVAL EXTERNAL FIXATOR;  Surgeon: Polly Swan DPM;  [facility];  Service: Podiatry    FOREIGN BODY REMOVAL Right 4/27/2021    Procedure: REMOVAL FOREIGN BODY EXTREMITY: RIGHT FOOT;  Surgeon: Milan Singh MD;  [facility];  Service: Plastics    INCISION AND DRAINAGE OF WOUND Right 2/17/2021    Procedure: INCISION AND DRAINAGE (I&D) EXTREMITY;  Surgeon: Polly Swan DPM;  [facility];  Service: Podiatry    ORIF FOOT FRACTURE Right 3/4/2021    Procedure: VAC PLACEMENT; SCREW FIXATION RIGHT FOOT FUSION;  Surgeon: Polly Swan DPM;  [facility];  Service: Podiatry    [address] [place] [address] Right 2/22/2021    Procedure: AMPUTATION TRANSMETATARSAL (TMA), REMOVAL NAIL IM T2 ICF HARDWARE;  Surgeon: Polly Swan DPM;  [facility];  Service: Podiatry    [place] FUSION FOOT BONES,MIDTARSAL,OSTEOTMY Right 2/12/2021    Procedure: foot ARTHRODESIS/FUSION;  Surgeon: Polly Swan DPM;  [facility];  Service: Podiatry    CT LENGTH/SHORT LEG/ANKL TENDON,SINGLE Right 2/12/2021    Procedure: LENGTHEN TIBIAL TENDON, TRANS HEEL CORD;  Surgeon: Kan Sharma DPM;  [facility];  Service: Podiatry    CT MUSCLE-SKIN FLAP,TRUNK Right 4/12/2021    Procedure: RECONSTRUCTION MICROSURGICAL W/ FREE FLAP;  Surgeon: Elizabeth Antoine MD;  [facility];  Service: Plastics    CT MUSCLE-SKIN FLAP,TRUNK Right 4/12/2021    Procedure: TAKEBACK RECONSTRUCTION MICROSURGICAL W/ FREE FLAP;  Surgeon: Elizabeth Antoine MD;  [facility];  Service: Plastics    CT MUSCLE-SKIN FLAP,TRUNK Right 4/13/2021    Procedure: RECONSTRUCTION MICROSURGICAL W/ FREE FLAP, RE EXPLORATION, MICRO VASCULAR REVISION;  Surgeon: Elizabeth Antoine MD;  [facility];  Service: Plastics    CT PART [facility] BONE METATARSAL HEAD,EA Right 12/29/2020    Procedure: EXCISION EXOSTOSIS;  Surgeon: Kan Sharma DPM;  [facility];  Service: Dani Jason SURG WND EXTEN/COMPLIC Right 35/41/9843    Procedure: PRIMARY DELAYED CLOSURE;  Surgeon: Kan Sharma DPM;  [facility];  Service: Podiatry    TOE AMPUTATION Right     partial great toe    TONSILLECTOMY      VAC DRESSING APPLICATION Right 3/4/8396    Procedure: APPLICATION VAC DRESSING EXTREMITY;  Surgeon: Elizabeth Antoine MD;  [facility];  Service: Plastics    [facility] Right 3/1/2021    Procedure: DEBRIDEMENT LOWER EXTREMITY ([address] [facility]); Surgeon: Elizabeth Antoine MD;  [facility];  Service: Plastics    WOUND DEBRIDEMENT Right 4/7/2021    Procedure: DEBRIDEMENT RIGHT FOOT;  Surgeon: Elizabeth Antoine MD;  [facility];  Service: Plastics    WOUND DEBRIDEMENT Right 4/12/2021    Procedure: DEBRIDEMENT LOWER EXTREMITY [facility] OUT);   Surgeon: Elizabeth Antoine MD;  [facility];  Service: Plastics    WOUND DEBRIDEMENT Right 4/27/2021    Procedure: DEBRIDEMENT LOWER EXTREMITY [facility] OUT): RIGHT FOOT;  Surgeon: Iveth Walters MD;  [facility];  Service: Plastics     Social History     Socioeconomic History    Marital status: /Civil Union     Spouse name: None    Number of children: None    Years of education: None    Highest education level: None   Occupational History    None   Tobacco Use    Smoking status: Former Smoker     Types: Cigarettes     Quit date: 1988     Years since quittin 6    Smokeless tobacco: Former User    Tobacco comment: exposure to passive smoke   Vaping Use    Vaping Use: Never used   Substance and Sexual Activity    Alcohol use: Yes     Comment: socially    Drug use: Not Currently    Sexual activity: Yes     Partners: Female   Other Topics Concern    None   Social History Narrative    None     Social Determinants of Health     Financial Resource Strain:     Difficulty of Paying Living Expenses:    Food Insecurity:     Worried About Running Out of Food in the Last Year:     Ran Out of Food in the Last Year:    Transportation Needs:     Lack of Transportation (Medical):      Lack of Transportation (Non-Medical):    Physical Activity:     Days of Exercise per Week:     Minutes of Exercise per Session:    Stress:     Feeling of Stress :    Social Connections:     Frequency of Communication with Friends and Family:     Frequency of Social Gatherings with Friends and Family:     Attends Tenriism Services:     Active Member of Clubs or Organizations:     Attends Club or Organization Meetings:     Marital Status:    Intimate Partner Violence:     Fear of Current or Ex-Partner:     Emotionally Abused:     Physically Abused:     Sexually Abused:         Current Outpatient Medications:     acetaminophen (Mapap Arthritis Pain) 650 mg CR tablet, Take 1 tablet (650 mg total) by mouth 2 (two) times a day (Patient not taking: Reported on 2021), Disp: 60 tablet, Rfl: 1    aspirin 325 mg tablet, Take 1 tablet (325 mg total) by mouth daily, Disp:  , Rfl: 0    atorvastatin (LIPITOR) 40 mg tablet, Take 1 tablet (40 mg total) by mouth daily, Disp: 90 tablet, Rfl: 3    Blood Glucose Monitoring Suppl (ACCU-CHEK JYOTI SMARTVIEW) w/Device KIT, use to check Blood Sugar as needed, Disp: , Rfl:     diltiazem (CARDIZEM) 90 mg tablet, Take 1 tablet (90 mg total) by mouth 2 (two) times a day, Disp: 60 tablet, Rfl: 1    docusate sodium (COLACE) 100 mg capsule, Take 1 capsule (100 mg total) by mouth 2 (two) times a day (Patient not taking: Reported on 6/3/2021), Disp: 10 capsule, Rfl: 0    Eliquis 5 MG, TAKE 1 TABLET BY MOUTH TWICE A DAY, Disp: 60 tablet, Rfl: 12    glimepiride (AMARYL) 2 mg tablet, Take 1 tablet (2 mg total) by mouth 2 (two) times a day, Disp: 60 tablet, Rfl: 1    glucose blood (Accu-Chek SmartView) test strip, Use as instructed BID, Disp: 200 each, Rfl: 1    Januvia 100 MG tablet, Take 1 tablet (100 mg total) by mouth daily, Disp: 132 tablet, Rfl: 3    lisinopril (ZESTRIL) 40 mg tablet, Take 1 tablet (40 mg total) by mouth daily, Disp: 90 tablet, Rfl: 3    melatonin 3 mg, Take 1 tablet (3 mg total) by mouth daily at bedtime (Patient not taking: Reported on 6/9/2021), Disp: , Rfl: 0    metFORMIN (GLUCOPHAGE) 500 mg tablet, Take 1 tablet (500 mg total) by mouth daily with breakfast, Disp: 90 tablet, Rfl: 6    metoprolol succinate (TOPROL-XL) 25 mg 24 hr tablet, Take (3) Tablets daily by mouth PO BID, Disp: 60 tablet, Rfl: 12    polyethylene glycol (MIRALAX) 17 g packet, Take 17 g by mouth daily (Patient not taking: Reported on 6/9/2021), Disp:  , Rfl: 0    Sodium Chloride Flush (NORMAL SALINE FLUSH IV), Infuse 10 mL into a venous catheter 4 (four) times a day, Disp: , Rfl:   Family History   Problem Relation Age of Onset    Diabetes Father         mellitus      Review of Systems   Constitutional: Positive for activity change   Negative for appetite change, chills, diaphoresis, fatigue, fever and unexpected weight change  Skin: Positive for wound  Allergies  Simvastatin and Itraconazole    Objective:  /80   Pulse 85   Temp 97 9 °F (36 6 °C)   Resp 18   Ht 6' 4" (1 93 m)   Wt 106 kg (233 lb)   BMI 28 36 kg/m²     Physical Exam  Vitals signs and nursing note reviewed  Constitutional:       General: He is not in acute distress  Appearance: Normal appearance  He is not ill-appearing, toxic-appearing or diaphoretic  Cardiovascular:      Pulses: Normal pulses  Heart sounds: Normal heart sounds  Pulmonary:      Effort: Pulmonary effort is normal       Breath sounds: Normal breath sounds  Musculoskeletal:      Comments: TMA Right foot   Skin:     Comments: See Wound Assessment  Neurological:      Mental Status: He is alert  Wound 06/04/21 Diabetic Ulcer Foot Anterior;Right (Active)   Wound Image     06/04/21 0947   Wound Description Pink;Probes to bone;Slough; Yellow;Epithelialization 06/04/21 1008   [place]-wound Assessment Scar Tissue 06/04/21 1008   Wound Length (cm) 1 2 cm 06/04/21 1008   Wound Width (cm) 20 cm 06/04/21 1008   Wound Depth (cm) 1 3 cm 06/04/21 1008   Wound Surface Area (cm^2) 24 cm^2 06/04/21 1008   Wound Volume (cm^3) 31 2 cm^3 06/04/21 1008   Calculated Wound Volume (cm^3) 31 2 cm^3 06/04/21 1008   Drainage Amount Moderate 06/04/21 1008   Drainage Description Serosanguineous 06/04/21 1008   Non-staged Wound Description Full thickness 06/04/21 1008           Wound 06/04/21 Diabetic Ulcer Foot Anterior;Right (Active)   Wound Image     06/04/21 0947   Wound Description Pink;Probes to bone;Slough; Yellow;Epithelialization 06/04/21 1008   [place]vern-wound Assessment Scar Tissue 06/04/21 1008   Wound Length (cm) 1 2 cm 06/04/21 1008   Wound Width (cm) 20 cm 06/04/21 1008   Wound Depth (cm) 1 3 cm 06/04/21 1008   Wound Surface Area (cm^2) 24 cm^2 06/04/21 1008   Wound Volume (cm^3) 31 2 cm^3 06/04/21 1008   Calculated Wound Volume (cm^3) 31 2 cm^3 06/04/21 1008   Drainage Amount Moderate 06/04/21 1008   Drainage Description Serosanguineous 06/04/21 1008   Non-staged Wound Description Full thickness 06/04/21 1008                         Diagnosis:  1  Aftercare for amputation stump    2  Deep disruption or dehiscence of operation wound, subsequent encounter  -     lidocaine (XYLOCAINE) 4 % topical solution 5 mL        Diagnosis ICD-10-CM Associated Orders   1  Aftercare for amputation stump  Z47 [id number]    2  Deep disruption or dehiscence of operation wound, subsequent encounter  T81  32XD lidocaine (XYLOCAINE) 4 % topical solution 5 mL        ASSESSMENT    Problems:    Chronic illness / Problem not at goal with exacerbation, progression or side effects of treatment  1  Open surgical wound          PLAN    Discussion of Management /  Recommendations  I feel this deep wound will heal quicker with NPWT  I ordered it after speaking with Dr Verito Kaye who is agreeable  Sutures left in flap for his inspection next week  Flap looks great  Once we can get the medial wound healed we can start the shoe fitting process  Darco wedge shoe ordered for heel weightbearing

## 2021-06-07 ENCOUNTER — TELEPHONE (OUTPATIENT)
Dept: FAMILY MEDICINE CLINIC | Facility: CLINIC | Age: 71
End: 2021-06-07

## 2021-06-07 ENCOUNTER — APPOINTMENT (OUTPATIENT)
Dept: LAB | Age: 71
End: 2021-06-07
Payer: COMMERCIAL

## 2021-06-07 ENCOUNTER — TRANSCRIBE ORDERS (OUTPATIENT)
Dept: ADMINISTRATIVE | Age: 71
End: 2021-06-07

## 2021-06-07 DIAGNOSIS — R78.81 BACTEREMIA: ICD-10-CM

## 2021-06-07 DIAGNOSIS — I48.91 ATRIAL FIBRILLATION WITH RVR (HCC): ICD-10-CM

## 2021-06-07 DIAGNOSIS — R78.81 BACTEREMIA: Primary | ICD-10-CM

## 2021-06-07 DIAGNOSIS — M86.411: ICD-10-CM

## 2021-06-07 LAB
ANION GAP SERPL CALCULATED.3IONS-SCNC: 6 MMOL/L (ref 4–13)
BASOPHILS # BLD AUTO: 0.08 THOUSANDS/ΜL (ref 0–0.1)
BASOPHILS NFR BLD AUTO: 1 % (ref 0–1)
BUN SERPL-MCNC: 18 MG/DL (ref 5–25)
CALCIUM SERPL-MCNC: 9.6 MG/DL (ref 8.3–10.1)
CHLORIDE SERPL-SCNC: 110 MMOL/L (ref 100–108)
CO2 SERPL-SCNC: 27 MMOL/L (ref 21–32)
CREAT SERPL-MCNC: 0.8 MG/DL (ref 0.6–1.3)
EOSINOPHIL # BLD AUTO: 0.45 THOUSAND/ΜL (ref 0–0.61)
EOSINOPHIL NFR BLD AUTO: 5 % (ref 0–6)
ERYTHROCYTE [DISTWIDTH] IN BLOOD BY AUTOMATED COUNT: 15 % (ref 11.6–15.1)
GFR SERPL CREATININE-BSD FRML MDRD: 91 ML/MIN/1.73SQ M
GLUCOSE SERPL-MCNC: 130 MG/DL (ref 65–140)
HCT VFR BLD AUTO: 40.6 % (ref 36.5–49.3)
HGB BLD-MCNC: 12.4 G/DL (ref 12–17)
IMM GRANULOCYTES # BLD AUTO: 0.03 THOUSAND/UL (ref 0–0.2)
IMM GRANULOCYTES NFR BLD AUTO: 0 % (ref 0–2)
LYMPHOCYTES # BLD AUTO: 2.13 THOUSANDS/ΜL (ref 0.6–4.47)
LYMPHOCYTES NFR BLD AUTO: 26 % (ref 14–44)
MCH RBC QN AUTO: 29.7 PG (ref 26.8–34.3)
MCHC RBC AUTO-ENTMCNC: 30.5 G/DL (ref 31.4–37.4)
MCV RBC AUTO: 97 FL (ref 82–98)
MONOCYTES # BLD AUTO: 0.7 THOUSAND/ΜL (ref 0.17–1.22)
MONOCYTES NFR BLD AUTO: 8 % (ref 4–12)
NEUTROPHILS # BLD AUTO: 4.97 THOUSANDS/ΜL (ref 1.85–7.62)
NEUTS SEG NFR BLD AUTO: 60 % (ref 43–75)
NRBC BLD AUTO-RTO: 0 /100 WBCS
PLATELET # BLD AUTO: 272 THOUSANDS/UL (ref 149–390)
PMV BLD AUTO: 11.1 FL (ref 8.9–12.7)
POTASSIUM SERPL-SCNC: 4.2 MMOL/L (ref 3.5–5.3)
RBC # BLD AUTO: 4.17 MILLION/UL (ref 3.88–5.62)
SODIUM SERPL-SCNC: 143 MMOL/L (ref 136–145)
WBC # BLD AUTO: 8.36 THOUSAND/UL (ref 4.31–10.16)

## 2021-06-07 PROCEDURE — 36415 COLL VENOUS BLD VENIPUNCTURE: CPT

## 2021-06-07 PROCEDURE — 80048 BASIC METABOLIC PNL TOTAL CA: CPT

## 2021-06-07 PROCEDURE — 85025 COMPLETE CBC W/AUTO DIFF WBC: CPT

## 2021-06-07 RX ORDER — METOPROLOL SUCCINATE 25 MG/1
TABLET, EXTENDED RELEASE ORAL
Qty: 60 TABLET | Refills: 12 | Status: SHIPPED | OUTPATIENT
Start: 2021-06-07 | End: 2021-08-27 | Stop reason: SDUPTHER

## 2021-06-08 NOTE — PROGRESS NOTES
Cardiology  Hospital Follow Up   Office Visit Note  Eryn Urbina    73 y o    male   MRN: [id number]  [address]  [address] [place]  [id number] [phone]    PCP: Freida Esteves MD  Cardiologist: Will be Dr Odilia Anderson              Summary of recommendations  Heart healthy diet  Educational information provided  Fasting lipid profile  Echocardiogram  NM myocardial SPECT  Follow up will be scheduled with Dr Odilia Anderson 6 weeks        Assessment/plan  Chest pain, occurred once, at rest   This may have been related to bradycardia  He has not had recurrence  He does have multiple CV risk factors and prior coronary artery calcifications noted on a CTA 2018  The patient is agreeable to proceeding with a pharmacological nuclear stress test and echocardiogram to risk stratify  Will also get an updated lipid profile  Coronary artery calcifications noted on CTA 2018  Chronic atrial fibrillation  Chads 2 Vasc= 4  Anticoagulated with Eliquis 5 mg b i d  Rate controlled with Cardizem 90 mg b i d , recently decreased from 180 b i d given bradycardia, Toprol 75 mg daily  Hypertension, essential   BP  126/70 on lisinopril, Cardizem, metoprolol succinate  Hyperlipidemia, on atorvastatin 40 mg daily  LDL 72: 2018  -intolerant to simvastatin: Myalgias  Ambulatory dysfunction, currently in a wheelchair  T2DM, 3/17/21 HgA1c 7 9    On metformin, Januvia, glimepiride  · 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, with free flap complications wound dehiscence  Following up with Plastic surgery and Podiatry  Cardiac testing   TTE  November 2018 ejection fraction 55% mild LVH mild left atrial enlargement              MICHAEL Hampton is a 74 yo male with chronic atrial fibrillation, hypertension, hyperlipidemia and diabetes mellitus  Has a congenital clubfoot and Charcot foot  He has undergone a transmetatarsal amputation of the right foot 2/2021    In April 2020 he was admitted under the plastic surgery service for a free flap reconstruction  During this admission he went into AFib with RVR and needed to be stabilized before his procedure could be completed  He was followed by Dr Hira Gill  He required revisions in the operating room x3, and complications of wound dehiscence  Was discharged to [facility] for rehab    5/11/21  Seen by Cardiology in [facility]  Complained of occasional chest pressure  A nuclear stress test and repeat echocardiogram recommended  The patient did not desired any testing done in the area prefer to be seen by Cardiology in [facility], given that he is in a wheelchair  Given a resting heart rate of 47 Cardizem was decreased from 180 b i d  to 90 mg b i d     6/9/221  Hospital follow-up  He is accompanied by his wife  He is in a wheelchair today  He did return home, he is mostly nonweightbearing, given recent plastic and podiatric surgery  He reports he did have 1 episode of chest discomfort,he described as heaviness, when he was at [facility] prompting an evaluation at [place] [id number]  The patient preferred to follow-up here  I am seeing him today on behalf of Dr Hira Gill due to scheduling constraints  The patient reports he has had no recurrence of chest discomfort  It may have been related to his bradycardia, as his medications were adjusted; his heart rate is improved and he has had no further chest discomfort  We did review his prior coronary artery calcium that was noted on a CTA in 2018, demonstrating coronary artery calcifications  He does have multiple CV risk factors  The patient is agreeable to proceeding with a pharmacological nuclear stress test as well as an echocardiogram to define any structural heart disease  Will also get an updated lipid profile prior to following up with Dr Hira Gill  His EKG today shows AFib with controlled ventricular response  His blood pressure is 126/70    Heart rate around 90            I have spent 40 minutes with Patient and family today in which greater than 50% of this time was spent in counseling/coordination of care regarding Intructions for management, Patient and family education, Importance of tx compliance and Risk factor reductions  Assessment  Diagnoses and all orders for this visit:    Essential hypertension  -     Echo complete with contrast if indicated; Future  -     NM myocardial perfusion spect (rx stress and/or rest); Future    Ambulatory dysfunction    Hyperlipidemia, unspecified hyperlipidemia type  -     Lipid panel; Future  -     Echo complete with contrast if indicated; Future  -     NM myocardial perfusion spect (rx stress and/or rest); Future    Longstanding persistent atrial fibrillation (HCC)  -     POCT ECG    Atrial fibrillation with RVR (HCC)    Chest pain, unspecified type  -     Echo complete with contrast if indicated; Future  -     NM myocardial perfusion spect (rx stress and/or rest); Future    Other orders  -     Cancel: metoprolol succinate (TOPROL-XL) 25 mg 24 hr tablet; Take (3) Tablets daily by mouth PO BID  -     Cancel: diltiazem (CARDIZEM) 90 mg tablet;  Take 1 tablet (90 mg total) by mouth 2 (two) times a day          Past Medical History:   Diagnosis Date    Arthritis     Atrial fibrillation ([address] )     Diagnosed 11/2018    Benign prostate hyperplasia 04/2002    Cellulitis     right lower leg     Colon polyp 2006    Diabetes mellitus ([address] )     Hearing aid worn     bilat    Hearing loss     90% loss left ear and 40% right ear    History of clubfoot     "since birth"    History of pneumonia     History of TIA (transient ischemic attack) 04/25/2017 11/30/18 pt denies    Hyperlipidemia 5/15/2014    Hypertension     Infectious viral hepatitis     "cant remember type"    Irregular heart beat     Neuropathy     both feet    Osteomyelitis ([facility] )     right great toe    Right middle lobe pulmonary nodule 11/6/2018    Seasickness     Teeth missing     Type 2 diabetes mellitus with diabetic neuropathy ([facility] ) 11/6/2018    Wears glasses     reading    Wound, open     bottom of right foot       Review of Systems   Constitution: Negative for chills  Cardiovascular: Negative for chest pain, claudication, cyanosis, dyspnea on exertion, irregular heartbeat, leg swelling, near-syncope, orthopnea, palpitations, paroxysmal nocturnal dyspnea and syncope  Respiratory: Negative for cough and shortness of breath  Musculoskeletal:        Right foot wound, dressed, not unwrapped   Gastrointestinal: Negative for heartburn and nausea  Neurological: Negative for dizziness, focal weakness, headaches, light-headedness and weakness  All other systems reviewed and are negative  Allergies   Allergen Reactions    Simvastatin Myalgia    Itraconazole Other (See Comments)     Pt denies knowledge of allergy               Current Outpatient Medications:     aspirin 325 mg tablet, Take 1 tablet (325 mg total) by mouth daily, Disp:  , Rfl: 0    atorvastatin (LIPITOR) 40 mg tablet, Take 1 tablet (40 mg total) by mouth daily, Disp: 90 tablet, Rfl: 3    Blood Glucose Monitoring Suppl (ACCU-CHEK JYOTI SMARTVIEW) w/Device KIT, use to check Blood Sugar as needed, Disp: , Rfl:     diltiazem (CARDIZEM) 90 mg tablet, Take 1 tablet (90 mg total) by mouth 2 (two) times a day, Disp: 60 tablet, Rfl: 1    Eliquis 5 MG, Take 1 tablet (5 mg total) by mouth 2 (two) times a day, Disp: 60 tablet, Rfl: 2    glimepiride (AMARYL) 2 mg tablet, Take 1 tablet (2 mg total) by mouth 2 (two) times a day, Disp: 60 tablet, Rfl: 1    glucose blood (Accu-Chek SmartView) test strip, Use as instructed BID, Disp: 200 each, Rfl: 1    Januvia 100 MG tablet, Take 1 tablet (100 mg total) by mouth daily, Disp: 132 tablet, Rfl: 3    lisinopril (ZESTRIL) 40 mg tablet, Take 1 tablet (40 mg total) by mouth daily, Disp: 90 tablet, Rfl: 3    metFORMIN (GLUCOPHAGE) 500 mg tablet, Take 1 tablet (500 mg total) by mouth daily with breakfast, Disp: 90 tablet, Rfl: 6    metoprolol succinate (TOPROL-XL) 25 mg 24 hr tablet, Take (3) Tablets daily by mouth PO BID, Disp: 60 tablet, Rfl: 12    acetaminophen (Mapap Arthritis Pain) 650 mg CR tablet, Take 1 tablet (650 mg total) by mouth 2 (two) times a day (Patient not taking: Reported on 2021), Disp: 60 tablet, Rfl: 1    docusate sodium (COLACE) 100 mg capsule, Take 1 capsule (100 mg total) by mouth 2 (two) times a day (Patient not taking: Reported on 6/3/2021), Disp: 10 capsule, Rfl: 0    melatonin 3 mg, Take 1 tablet (3 mg total) by mouth daily at bedtime (Patient not taking: Reported on 2021), Disp: , Rfl: 0    polyethylene glycol (MIRALAX) 17 g packet, Take 17 g by mouth daily (Patient not taking: Reported on 2021), Disp:  , Rfl: 0    Sodium Chloride Flush (NORMAL SALINE FLUSH IV), Infuse 10 mL into a venous catheter 4 (four) times a day, Disp: , Rfl:         Social History     Socioeconomic History    Marital status: /Civil Union     Spouse name: Not on file    Number of children: Not on file    Years of education: Not on file    Highest education level: Not on file   Occupational History    Not on file   Social Needs    Financial resource strain: Not on file    Food insecurity     Worry: Not on file     Inability: Not on file   [employer] needs     Medical: Not on file     Non-medical: Not on file   Tobacco Use    Smoking status: Former Smoker     Types: Cigarettes     Quit date: 1988     Years since quittin 6    Smokeless tobacco: Former User    Tobacco comment: exposure to passive smoke   Substance and Sexual Activity    Alcohol use: Yes     Frequency: 2-4 times a month     Drinks per session: 1 or 2     Comment: socially    Drug use: Not Currently    Sexual activity: Yes     Partners: Female   Lifestyle    Physical activity     Days per week: Not on file     Minutes per session: Not on file    Stress: Not on file   Relationships    Social connections     Talks on phone: Not on file     Gets together: Not on file     Attends Congregational service: Not on file     Active member of club or organization: Not on file     Attends meetings of clubs or organizations: Not on file     Relationship status: Not on file    Intimate partner violence     Fear of current or ex partner: Not on file     Emotionally abused: Not on file     Physically abused: Not on file     Forced sexual activity: Not on file   Other Topics Concern    Not on file   Social History Narrative    Not on file       Family History   Problem Relation Age of Onset    Diabetes Father         mellitus       Physical Exam   Constitutional: He is oriented to person, place, and time  No distress  HENT:   Head: Normocephalic and atraumatic  Eyes: Conjunctivae and EOM are normal    Neck: Normal range of motion  Neck supple  Cardiovascular: Normal rate, normal heart sounds and intact distal pulses  An irregularly irregular rhythm present  Right foot wound, dressed, not on wrapped or reassessed  Pulmonary/Chest: Effort normal and breath sounds normal    Abdominal: Soft  Bowel sounds are normal    Musculoskeletal: Normal range of motion  Neurological: He is alert and oriented to person, place, and time  Skin: Skin is warm and dry  Healing graft site left thigh   Psychiatric: He has a normal mood and affect  Nursing note and vitals reviewed  Vitals: Blood pressure 126/70, pulse 89, height 6' 4" (1 93 m), SpO2 98 %     Wt Readings from Last 3 Encounters:   06/04/21 106 kg (233 lb)   05/11/21 97 5 kg (215 lb)   05/03/21 96 8 kg (213 lb 6 4 oz)         Labs & Results:  Lab Results   Component Value Date    WBC 8 36 06/07/2021    HGB 12 4 06/07/2021    HCT 40 6 06/07/2021    MCV 97 06/07/2021     06/07/2021     No results found for: BNP  No components found for: CHEM  Troponin I   Date Value Ref Range Status   02/16/2021 <0 02 <=0 04 ng/mL Final     Comment:     Siemens Chemistry analyzer 99% cutoff is > 0 04 ng/mL in network labs     o cTnI 99% cutoff is useful only when applied to patients in the clinical setting of myocardial ischemia   o cTnI 99% cutoff should be interpreted in the context of clinical history, ECG findings and possibly cardiac imaging to establish correct diagnosis  o cTnI 99% cutoff may be suggestive but clearly not indicative of a coronary event without the clinical setting of myocardial ischemia  2018 <0 02 <=0 04 ng/mL Final     Comment:       Siemens Chemistry analyzer 99% cutoff is > 0 04 ng/mL in network labs     o cTnI 99% cutoff is useful only when applied to patients in the clinical setting of myocardial ischemia   o cTnI 99% cutoff should be interpreted in the context of clinical history, ECG findings and possibly cardiac imaging to establish correct diagnosis  o cTnI 99% cutoff may be suggestive but clearly not indicative of a coronary event without the clinical setting of myocardial ischemia  Results for orders placed during the hospital encounter of 18   Echo complete with contrast if indicated    Narrative [address]  [phone]    Transthoracic Echocardiogram  2D, M-mode, Doppler, and Color Doppler    Study date:  2018    Patient: Jordyn Downey  MR number: [id number]  Account number: [de-identified]  : 1950  Age: 79 years  Gender: Male  Status: Inpatient  Location: Bedside  Height: 76 in  Weight: 238 lb  BP: 143/ 112 mmHg    Indications: Atrial fibrillation      Diagnoses: I48 0 - Atrial fibrillation    Sonographer:  JULIAN Coppola  Primary Physician:  Tessa Pfeiffer MD  Referring Physician:  Amanda Cantu DO  Group:  [id number]  Interpreting Physician:  Edwin Barron MD    SUMMARY    LEFT VENTRICLE:  Systolic function was normal  Ejection fraction was estimated to be 55 %  There were no regional wall motion abnormalities  Wall thickness was mildly increased  LEFT ATRIUM:  The atrium was mildly dilated  RIGHT ATRIUM:  The atrium was mildly dilated  IVC, HEPATIC VEINS:  The inferior vena cava was mildly dilated  HISTORY: PRIOR HISTORY: Risk factors: hypertension, diabetes, and a former history of cigarette use (quitting more than one month ago)  PROCEDURE: The procedure was performed at the bedside  This was a routine study  The transthoracic approach was used  The study included complete 2D imaging, M-mode, complete spectral Doppler, and color Doppler  The heart rate was 90 bpm,  at the start of the study  Image quality was adequate  LEFT VENTRICLE: Size was normal  Systolic function was normal  Ejection fraction was estimated to be 55 %  There were no regional wall motion abnormalities  Wall thickness was mildly increased  DOPPLER: Transmitral flow pattern: atrial  fibrillation  RIGHT VENTRICLE: The size was normal  Systolic function was normal     LEFT ATRIUM: The atrium was mildly dilated  RIGHT ATRIUM: The atrium was mildly dilated  MITRAL VALVE: Valve structure was normal  There was normal leaflet separation  DOPPLER: The transmitral velocity was within the normal range  There was no evidence for stenosis  There was no significant regurgitation  AORTIC VALVE: The valve was trileaflet  Leaflets exhibited mildly increased thickness, normal cuspal separation, and sclerosis  DOPPLER: Transaortic velocity was within the normal range  There was no evidence for stenosis  There was no  regurgitation  TRICUSPID VALVE: The valve structure was normal  There was normal leaflet separation  DOPPLER: The transtricuspid velocity was within the normal range  There was no evidence for stenosis  There was no significant regurgitation  The  tricuspid jet envelope definition was inadequate for estimation of RV systolic pressure      PULMONIC VALVE: Leaflets exhibited normal thickness, no calcification, and normal cuspal separation  DOPPLER: The transpulmonic velocity was within the normal range  There was no significant regurgitation  PERICARDIUM: There was no pericardial effusion  AORTA: The root exhibited normal size  SYSTEMIC VEINS: IVC: The inferior vena cava was mildly dilated  SYSTEM MEASUREMENT TABLES    2D  %FS: 38 55 %  Ao Diam: 3 58 cm  EDV(Teich): 169 9 ml  EF(Teich): 68 07 %  ESV(Teich): 54 26 ml  IVSd: 1 07 cm  LA Area: 24 56 cm2  LA Diam: 4 24 cm  LVEDV MOD A4C: 138 09 ml  LVEF MOD A4C: 52 95 %  LVESV MOD A4C: 64 97 ml  LVIDd: 5 85 cm  LVIDs: 3 6 cm  LVLd A4C: 8 81 cm  LVLs A4C: 7 53 cm  LVPWd: 1 12 cm  RA Area: 25 78 cm2  RVIDd: 3 91 cm  SV MOD A4C: 73 12 ml  SV(Teich): 115 65 ml    [facility]    Prepared and electronically signed by    Delores Spence MD  Signed [id number] 12:58:05       No results found for this or any previous visit  This note was completed in part utilizing [employer] direct voice recognition software  Grammatical errors, random word insertion, spelling mistakes, and incomplete sentences may be an occasional consequence of the system secondary to software limitations, ambient noise and hardware issues  At the time of dictation, efforts were made to edit, clarify and /or correct errors  Please read the chart carefully and recognize, using context, where substitutions have occurred    If you have any questions or concerns about the context, text or information contained within the body of this dictation, please contact myself, the provider, for further clarification

## 2021-06-09 ENCOUNTER — TELEPHONE (OUTPATIENT)
Dept: INFECTIOUS DISEASES | Facility: CLINIC | Age: 71
End: 2021-06-09

## 2021-06-09 ENCOUNTER — OFFICE VISIT (OUTPATIENT)
Dept: PLASTIC SURGERY | Facility: CLINIC | Age: 71
End: 2021-06-09

## 2021-06-09 ENCOUNTER — OFFICE VISIT (OUTPATIENT)
Dept: CARDIOLOGY CLINIC | Facility: CLINIC | Age: 71
End: 2021-06-09
Payer: COMMERCIAL

## 2021-06-09 VITALS
HEART RATE: 89 BPM | HEIGHT: 76 IN | DIASTOLIC BLOOD PRESSURE: 70 MMHG | OXYGEN SATURATION: 98 % | SYSTOLIC BLOOD PRESSURE: 126 MMHG | BODY MASS INDEX: 28.36 KG/M2

## 2021-06-09 DIAGNOSIS — I10 ESSENTIAL HYPERTENSION: Primary | Chronic | ICD-10-CM

## 2021-06-09 DIAGNOSIS — R26.2 AMBULATORY DYSFUNCTION: ICD-10-CM

## 2021-06-09 DIAGNOSIS — Z89.431 S/P TRANSMETATARSAL AMPUTATION OF FOOT, RIGHT (HCC): ICD-10-CM

## 2021-06-09 DIAGNOSIS — Z98.890 S/P FLAP GRAFT: Primary | ICD-10-CM

## 2021-06-09 DIAGNOSIS — I48.91 ATRIAL FIBRILLATION WITH RVR (HCC): ICD-10-CM

## 2021-06-09 DIAGNOSIS — I48.11 LONGSTANDING PERSISTENT ATRIAL FIBRILLATION (HCC): ICD-10-CM

## 2021-06-09 DIAGNOSIS — R07.9 CHEST PAIN, UNSPECIFIED TYPE: ICD-10-CM

## 2021-06-09 DIAGNOSIS — E78.5 HYPERLIPIDEMIA, UNSPECIFIED HYPERLIPIDEMIA TYPE: Chronic | ICD-10-CM

## 2021-06-09 PROCEDURE — 1160F RVW MEDS BY RX/DR IN RCRD: CPT | Performed by: NURSE PRACTITIONER

## 2021-06-09 PROCEDURE — 99215 OFFICE O/P EST HI 40 MIN: CPT | Performed by: NURSE PRACTITIONER

## 2021-06-09 PROCEDURE — 1036F TOBACCO NON-USER: CPT | Performed by: NURSE PRACTITIONER

## 2021-06-09 PROCEDURE — 93000 ELECTROCARDIOGRAM COMPLETE: CPT | Performed by: NURSE PRACTITIONER

## 2021-06-09 PROCEDURE — 99024 POSTOP FOLLOW-UP VISIT: CPT | Performed by: PHYSICIAN ASSISTANT

## 2021-06-09 RX ORDER — DILTIAZEM HCL 90 MG
90 TABLET ORAL 2 TIMES DAILY
Qty: 60 TABLET | Refills: 1 | Status: CANCELLED | OUTPATIENT
Start: 2021-06-09

## 2021-06-09 RX ORDER — METOPROLOL SUCCINATE 25 MG/1
TABLET, EXTENDED RELEASE ORAL
Qty: 60 TABLET | Refills: 12 | Status: CANCELLED | OUTPATIENT
Start: 2021-06-09

## 2021-06-09 NOTE — TELEPHONE ENCOUNTER
Received a call from patient's visiting nurse  She is at the patients home today to DC PICC  Unfortunately she had realized that patient had been taking his ampicillin q8 instead of q6  He did however dose his cefepime accurately  Patient was set to complete through 6/8 (yesterday)    Blood work has been OK  Physical evaluation of extremity is ok per nurse  Vitals signs WNL  Dr Ni Castrejon states that patients PICC be pulled  No PO abx indicated  Patient should seek immediate attention should any signs or symptoms of infection recur      Patient and pt's home nurse verbalize understanding

## 2021-06-09 NOTE — PROGRESS NOTES
POST-OP EVALUATION  6/10/2021    Subjective   Shelia Barnard  is a 73 y o  male is here today for routine post-operative follow up, s/p 4/12/21 DEBRIDEMENT LOWER EXTREMITY ([address] OUT) (Right Foot)       RECONSTRUCTION MICROSURGICAL W/ FREE FLAP (Right Foot)     Pt presents with foot wrapped, and VAC on and functioning      Past Medical History:   Diagnosis Date    Arthritis     Atrial fibrillation ([facility] )     Diagnosed 11/2018    Benign prostate hyperplasia 04/2002    Cellulitis     right lower leg     Colon polyp 2006    Diabetes mellitus ([facility] )     Hearing aid worn     bilat    Hearing loss     90% loss left ear and 40% right ear    History of clubfoot     "since birth"    History of pneumonia     History of TIA (transient ischemic attack) 04/25/2017 11/30/18 pt denies    Hyperlipidemia 5/15/2014    Hypertension     Infectious viral hepatitis     "cant remember type"    Irregular heart beat     Neuropathy     both feet    Osteomyelitis ([facility] )     right great toe    Right middle lobe pulmonary nodule 11/6/2018    Seasickness     Teeth missing     Type 2 diabetes mellitus with diabetic neuropathy ([facility] ) 11/6/2018    Wears glasses     reading    Wound, open     bottom of right foot     Past Surgical History:   Procedure Laterality Date    BUNIONECTOMY Right 12/4/2018    Procedure: 5TH METATARSAL BONE PARTIAL RESECTION, FULL THICKNESS DEBRIDEMENT OF DIABETIC ULCER;  Surgeon: Mark Jones DPM;  [facility];  Service: Podiatry    CLUB FOOT RELEASE Bilateral     COLONOSCOPY      COMPLEX WOUND CLOSURE TO EXTREMITY  4/27/2021    Procedure: COMPLEX WOUND CLOSURE TO EXTREMITY: RIGHT FOOT;  Surgeon: Krystle Wagner MD;  [facility];  Service: Plastics    EXTERNAL FIXATOR APPLICATION Right 8/34/7121    Procedure: Application multiplane external fixation;  Surgeon: Enrique Baer DPM;  [facility];  Service: Podiatry    FOOT HARDWARE REMOVAL Right 2/17/2021 Procedure: REMOVAL EXTERNAL FIXATOR;  Surgeon: Silvia Palomino DPM;  [facility];  Service: Podiatry    FOREIGN BODY REMOVAL Right 4/27/2021    Procedure: REMOVAL FOREIGN BODY EXTREMITY: RIGHT FOOT;  Surgeon: Loren Austin MD;  [facility];  Service: Plastics    INCISION AND DRAINAGE OF WOUND Right 2/17/2021    Procedure: INCISION AND DRAINAGE (I&D) EXTREMITY;  Surgeon: Silvia Palomino DPM;  [facility];  Service: Podiatry    ORIF FOOT FRACTURE Right 3/4/2021    Procedure: VAC PLACEMENT; SCREW FIXATION RIGHT FOOT FUSION;  Surgeon: Silvia Palomino DPM;  [facility];  Service: Podiatry    [address] Right 2/22/2021    Procedure: AMPUTATION TRANSMETATARSAL (TMA), REMOVAL NAIL IM T2 ICF HARDWARE;  Surgeon: Silvia Palomino DPM;  [facility];  Service: Podiatry    OR FUSION FOOT BONES,MIDTARSAL,OSTEOTMY Right 2/12/2021    Procedure: foot ARTHRODESIS/FUSION;  Surgeon: Silvia Palomino DPM;  [facility];  Service: Podiatry    OR LENGTH/SHORT LEG/ANKL TENDON,SINGLE Right 2/12/2021    Procedure: LENGTHEN TIBIAL TENDON, TRANS HEEL CORD;  Surgeon: Silvia Palomino DPM;  [facility];  Service: Podiatry    OR MUSCLE-SKIN FLAP,TRUNK Right 4/12/2021    Procedure: RECONSTRUCTION MICROSURGICAL W/ FREE FLAP;  Surgeon: Loren Austin MD;  [facility];  Service: Plastics    OR MUSCLE-SKIN FLAP,TRUNK Right 4/12/2021    Procedure: TAKEBACK RECONSTRUCTION MICROSURGICAL W/ FREE FLAP;  Surgeon: Loren Austin MD;  [facility];  Service: Plastics    OR MUSCLE-SKIN FLAP,TRUNK Right 4/13/2021    Procedure: RECONSTRUCTION MICROSURGICAL W/ FREE FLAP, RE EXPLORATION, MICRO VASCULAR REVISION;  Surgeon: Loren Austin MD;  [facility];  Service: Plastics    OR PART [address] BONE METATARSAL HEAD,EA Right 12/29/2020    Procedure: EXCISION EXOSTOSIS;  Surgeon: Silvia Palomino DPM; [facility];  Service: Podiatry    [place] SE[facility] CLOS SURG WND EXTEN/COMPLIC Right 02/82/2153    Procedure: PRIMARY DELAYED CLOSURE;  Surgeon: Alyson Kwan DPM;  [facility];  Service: Podiatry    TOE AMPUTATION Right     partial great toe    TONSILLECTOMY      VAC DRESSING APPLICATION Right 3/6/2960    Procedure: APPLICATION VAC DRESSING EXTREMITY;  Surgeon: Nicola Mcgregor MD;  [facility];  Service: Plastics    [address] Right 3/1/2021    Procedure: DEBRIDEMENT LOWER EXTREMITY ([facility] OUT); Surgeon: Nicola Mcgregor MD;  [facility];  Service: Plastics    WOUND DEBRIDEMENT Right 4/7/2021    Procedure: DEBRIDEMENT RIGHT FOOT;  Surgeon: Nicola Mcgregor MD;  [facility];  Service: Plastics    WOUND DEBRIDEMENT Right 4/12/2021    Procedure: DEBRIDEMENT LOWER EXTREMITY [facility] OUT);   Surgeon: Nicola Mcgregor MD;  [facility];  Service: Plastics    WOUND DEBRIDEMENT Right 4/27/2021    Procedure: DEBRIDEMENT LOWER EXTREMITY ([address]): RIGHT FOOT;  Surgeon: Nicola Mcgregor MD;  [facility];  Service: Plastics        Current Outpatient Medications:     acetaminophen (Mapap Arthritis Pain) 650 mg CR tablet, Take 1 tablet (650 mg total) by mouth 2 (two) times a day (Patient not taking: Reported on 6/9/2021), Disp: 60 tablet, Rfl: 1    aspirin 325 mg tablet, Take 1 tablet (325 mg total) by mouth daily, Disp:  , Rfl: 0    atorvastatin (LIPITOR) 40 mg tablet, Take 1 tablet (40 mg total) by mouth daily, Disp: 90 tablet, Rfl: 3    Blood Glucose Monitoring Suppl (ACCU-CHEK JYOTI SMARTVIEW) w/Device KIT, use to check Blood Sugar as needed, Disp: , Rfl:     diltiazem (CARDIZEM) 90 mg tablet, Take 1 tablet (90 mg total) by mouth 2 (two) times a day, Disp: 60 tablet, Rfl: 1    docusate sodium (COLACE) 100 mg capsule, Take 1 capsule (100 mg total) by mouth 2 (two) times a day (Patient not taking: Reported on 6/3/2021), Disp: 10 capsule, Rfl: 0    Eliquis 5 MG, Take 1 tablet (5 mg total) by mouth 2 (two) times a day, Disp: 60 tablet, Rfl: 2    glimepiride (AMARYL) 2 mg tablet, Take 1 tablet (2 mg total) by mouth 2 (two) times a day, Disp: 60 tablet, Rfl: 1    glucose blood (Accu-Chek SmartView) test strip, Use as instructed BID, Disp: 200 each, Rfl: 1    Januvia 100 MG tablet, Take 1 tablet (100 mg total) by mouth daily, Disp: 132 tablet, Rfl: 3    lisinopril (ZESTRIL) 40 mg tablet, Take 1 tablet (40 mg total) by mouth daily, Disp: 90 tablet, Rfl: 3    melatonin 3 mg, Take 1 tablet (3 mg total) by mouth daily at bedtime (Patient not taking: Reported on 6/9/2021), Disp: , Rfl: 0    metFORMIN (GLUCOPHAGE) 500 mg tablet, Take 1 tablet (500 mg total) by mouth daily with breakfast, Disp: 90 tablet, Rfl: 6    metoprolol succinate (TOPROL-XL) 25 mg 24 hr tablet, Take (3) Tablets daily by mouth PO BID, Disp: 60 tablet, Rfl: 12    polyethylene glycol (MIRALAX) 17 g packet, Take 17 g by mouth daily (Patient not taking: Reported on 6/9/2021), Disp:  , Rfl: 0    Sodium Chloride Flush (NORMAL SALINE FLUSH IV), Infuse 10 mL into a venous catheter 4 (four) times a day, Disp: , Rfl:   Allergies: Simvastatin and Itraconazole    Objective    VAC removed  Granulation tissue in the open wound that had been packed with VAC foam  Macerated tissue medially along the flap incision  Small shallow open wound laterally with fibrinous exudate  Flap is soft, well perfused  No drainage  Assessment/Plan   Diagnoses and all orders for this visit:    S/P R ALT flap to right foot    S/P transmetatarsal amputation of foot, right ([address] )    Pt to have VAC changed at home by [facility]  Sponge today was placed along the distal and medial aspect of the flap  Small piece of foam inserted into the distal open wound   Adhesive placed first, then cut open along the incision ;ateral and medial to the open wound, then sponge placed on this      Lateral shallow wound should be covered with calcium alginate  Pt to followup with us in 2 weeks  He sees Dr Keshia Josue of wound care next week    Jose Velasquez PA-C

## 2021-06-09 NOTE — LETTER
June 9, 2021     Lynda Douglass, [address] [place] [id number]    Patient: Baron Britt  YOB: 1950   Date of Visit: 6/9/2021       Dear Dr Longoria Proper:    Thank you for referring Yordy Fernandez to me for evaluation  Below are my notes for this consultation  If you have questions, please do not hesitate to call me  I look forward to following your patient along with you  Sincerely,        KIRIT Duque        CC: MD Georgi Sommers, [facility]  6/9/2021  3:42 PM  Sign when [facility] Visit  Cardiology  [facility] Follow Up   Office Visit Note  Baron Britt    73 y o    male   MRN: [id number]  [address]  [address] [place] [id number] [phone]    PCP: Lynda Douglass MD  Cardiologist: Will be Dr Alondra Huddleston              Summary of recommendations  Heart healthy diet  Educational information provided  Fasting lipid profile  Echocardiogram  NM myocardial SPECT  Follow up will be scheduled with Dr Alondra Huddleston 6 weeks        Assessment/plan  Chest pain, occurred once, at rest   This may have been related to bradycardia  He has not had recurrence  He does have multiple CV risk factors and prior coronary artery calcifications noted on a CTA 2018  The patient is agreeable to proceeding with a pharmacological nuclear stress test and echocardiogram to risk stratify  Will also get an updated lipid profile  Coronary artery calcifications noted on CTA 2018  Chronic atrial fibrillation  Chads 2 Vasc= 4  Anticoagulated with Eliquis 5 mg b i d  Rate controlled with Cardizem 90 mg b i d , recently decreased from 180 b i d given bradycardia, Toprol 75 mg daily  Hypertension, essential   BP  126/70 on lisinopril, Cardizem, metoprolol succinate  Hyperlipidemia, on atorvastatin 40 mg daily  LDL 72: 2018  -intolerant to simvastatin: Myalgias  Ambulatory dysfunction, currently in a wheelchair  T2DM, 3/17/21 HgA1c 7 9      On metformin, Januvia, glimepiride  · 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, with free flap complications wound dehiscence  Following up with Plastic surgery and Podiatry  Cardiac testing   TTE  November 2018 ejection fraction 55% mild LVH mild left atrial enlargement              MICHAEL Gleason is a 74 yo male with chronic atrial fibrillation, hypertension, hyperlipidemia and diabetes mellitus  Has a congenital clubfoot and Charcot foot  He has undergone a transmetatarsal amputation of the right foot 2/2021  In April 2020 he was admitted under the plastic surgery service for a free flap reconstruction  During this admission he went into AFib with RVR and needed to be stabilized before his procedure could be completed  He was followed by Dr Jessica Pal  He required revisions in the operating room x3, and complications of wound dehiscence  Was discharged to [facility] for rehab    5/11/21  Seen by Cardiology in [facility]  Complained of occasional chest pressure  A nuclear stress test and repeat echocardiogram recommended  The patient did not desired any testing done in the area prefer to be seen by Cardiology in [facility], given that he is in a wheelchair  Given a resting heart rate of 47 Cardizem was decreased from 180 b i d  to 90 mg b i d     6/9/221  Hospital follow-up  He is accompanied by his wife  He is in a wheelchair today  He did return home, he is mostly nonweightbearing, given recent plastic and podiatric surgery  He reports he did have 1 episode of chest discomfort,he described as heaviness, when he was at [facility] prompting an evaluation at [place] [id number]  The patient preferred to follow-up here  I am seeing him today on behalf of Dr Jessica Pal due to scheduling constraints  The patient reports he has had no recurrence of chest discomfort    It may have been related to his bradycardia, as his medications were adjusted; his heart rate is improved and he has had no further chest discomfort  We did review his prior coronary artery calcium that was noted on a CTA in 2018, demonstrating coronary artery calcifications  He does have multiple CV risk factors  The patient is agreeable to proceeding with a pharmacological nuclear stress test as well as an echocardiogram to define any structural heart disease  Will also get an updated lipid profile prior to following up with Dr Garcia Degree  His EKG today shows AFib with controlled ventricular response  His blood pressure is 126/70  Heart rate around 90            I have spent 40 minutes with Patient and family today in which greater than 50% of this time was spent in counseling/coordination of care regarding Intructions for management, Patient and family education, Importance of tx compliance and Risk factor reductions  Assessment  Diagnoses and all orders for this visit:    Essential hypertension  -     Echo complete with contrast if indicated; Future  -     NM myocardial perfusion spect (rx stress and/or rest); Future    Ambulatory dysfunction    Hyperlipidemia, unspecified hyperlipidemia type  -     Lipid panel; Future  -     Echo complete with contrast if indicated; Future  -     NM myocardial perfusion spect (rx stress and/or rest); Future    Longstanding persistent atrial fibrillation (HCC)  -     POCT ECG    Atrial fibrillation with RVR (HCC)    Chest pain, unspecified type  -     Echo complete with contrast if indicated; Future  -     NM myocardial perfusion spect (rx stress and/or rest); Future    Other orders  -     Cancel: metoprolol succinate (TOPROL-XL) 25 mg 24 hr tablet; Take (3) Tablets daily by mouth PO BID  -     Cancel: diltiazem (CARDIZEM) 90 mg tablet;  Take 1 tablet (90 mg total) by mouth 2 (two) times a day          Past Medical History:   Diagnosis Date    Arthritis     [facility]     Diagnosed 11/2018    Benign prostate hyperplasia 04/2002    Cellulitis     right lower leg     Colon polyp 2006    Diabetes mellitus ([facility]     Hearing aid worn     bilat    Hearing loss     90% loss left ear and 40% right ear    History of clubfoot     "since birth"    History of pneumonia     History of TIA (transient ischemic attack) 04/25/2017 11/30/18 pt denies    Hyperlipidemia 5/15/2014    Hypertension     Infectious viral hepatitis     "cant remember type"    Irregular heart beat     Neuropathy     both feet    Osteomyelitis ([facility] [address] )     right great toe    Right middle lobe pulmonary nodule 11/6/2018    Seasickness     Teeth missing     Type [facility] diabetes [facility] with [facility] 11/6/2018    Wears glasses     reading    Wound, open     bottom of right foot       Review of Systems   Constitution: Negative for chills  Cardiovascular: Negative for chest pain, claudication, cyanosis, dyspnea on exertion, irregular heartbeat, leg swelling, near-syncope, orthopnea, palpitations, paroxysmal nocturnal dyspnea and syncope  Respiratory: Negative for cough and shortness of breath  Musculoskeletal:        Right foot wound, dressed, not unwrapped   Gastrointestinal: Negative for heartburn and nausea  Neurological: Negative for dizziness, focal weakness, headaches, light-headedness and weakness  All other systems reviewed and are negative  Allergies   Allergen Reactions    Simvastatin Myalgia    Itraconazole Other (See Comments)     Pt denies knowledge of allergy               Current Outpatient Medications:     aspirin 325 mg tablet, Take 1 tablet (325 mg total) by mouth daily, Disp:  , Rfl: 0    atorvastatin (LIPITOR) 40 mg tablet, Take 1 tablet (40 mg total) by mouth daily, Disp: 90 tablet, Rfl: 3    Blood Glucose Monitoring Suppl (ACCU-CHEK JYOTI SMARTVIEW) w/Device KIT, use to check Blood Sugar as needed, Disp: , Rfl:     diltiazem (CARDIZEM) 90 mg tablet, Take 1 tablet (90 mg total) by mouth 2 (two) times a day, Disp: 60 tablet, Rfl: 1    Eliquis 5 MG, Take 1 tablet (5 mg total) by mouth 2 (two) times a day, Disp: 60 tablet, Rfl: 2    glimepiride (AMARYL) 2 mg tablet, Take 1 tablet (2 mg total) by mouth 2 (two) times a day, Disp: 60 tablet, Rfl: 1    glucose blood (Accu-Chek SmartView) test strip, Use as instructed BID, Disp: 200 each, Rfl: 1    Januvia 100 MG tablet, Take 1 tablet (100 mg total) by mouth daily, Disp: 132 tablet, Rfl: 3    lisinopril (ZESTRIL) 40 mg tablet, Take 1 tablet (40 mg total) by mouth daily, Disp: 90 tablet, Rfl: 3    metFORMIN (GLUCOPHAGE) 500 mg tablet, Take 1 tablet (500 mg total) by mouth daily with breakfast, Disp: 90 tablet, Rfl: 6    metoprolol succinate (TOPROL-XL) 25 mg 24 hr tablet, Take (3) Tablets daily by mouth PO BID, Disp: 60 tablet, Rfl: 12    acetaminophen (Mapap Arthritis Pain) 650 mg CR tablet, Take 1 tablet (650 mg total) by mouth 2 (two) times a day (Patient not taking: Reported on 6/9/2021), Disp: 60 tablet, Rfl: 1    docusate sodium (COLACE) 100 mg capsule, Take 1 capsule (100 mg total) by mouth 2 (two) times a day (Patient not taking: Reported on 6/3/2021), Disp: 10 capsule, Rfl: 0    melatonin 3 mg, Take 1 tablet (3 mg total) by mouth daily at bedtime (Patient not taking: Reported on 6/9/2021), Disp: , Rfl: 0    polyethylene glycol (MIRALAX) 17 g packet, Take 17 g by mouth daily (Patient not taking: Reported on 6/9/2021), Disp:  , Rfl: 0    Sodium Chloride Flush (NORMAL SALINE FLUSH IV), Infuse 10 mL into a venous catheter 4 (four) times a day, Disp: , Rfl:         Social History     Socioeconomic History    Marital status: /Civil Union     Spouse name: Not on file    Number of children: Not on file    Years of education: Not on file    Highest education level: Not on file   Occupational History    Not on file   Social Needs    Financial resource strain: Not on file    Food insecurity     Worry: Not on file     Inability: Not on file    Transportation needs     Medical: Not on file     Non-medical: Not on file   Tobacco Use    Smoking status: Former Smoker     Types: Cigarettes     Quit date: 1988     Years since quittin 6    Smokeless tobacco: Former User    Tobacco comment: exposure to passive smoke   Substance and Sexual Activity    Alcohol use: Yes     Frequency: 2-4 times a month     Drinks per session: 1 or 2     Comment: socially    Drug use: Not Currently    Sexual activity: Yes     Partners: Female   Lifestyle    Physical activity     Days per week: Not on file     Minutes per session: Not on file    Stress: Not on file   Relationships    Social connections     Talks on phone: Not on file     Gets together: Not on file     Attends Yarsani service: Not on file     Active member of club or organization: Not on file     Attends meetings of clubs or organizations: Not on file     Relationship status: Not on file    Intimate partner violence     Fear of current or ex partner: Not on file     Emotionally abused: Not on file     Physically abused: Not on file     Forced sexual activity: Not on file   Other Topics Concern    Not on file   Social History Narrative    Not on file       Family History   Problem Relation Age of Onset    Diabetes Father         mellitus       Physical Exam   Constitutional: He is oriented to person, place, and time  No distress  HENT:   Head: Normocephalic and atraumatic  Eyes: Conjunctivae and EOM are normal    Neck: Normal range of motion  Neck supple  Cardiovascular: Normal rate, normal heart sounds and intact distal pulses  An irregularly irregular rhythm present  Right foot wound, dressed, not on wrapped or reassessed  Pulmonary/Chest: Effort normal and breath sounds normal    Abdominal: Soft  Bowel sounds are normal    Musculoskeletal: Normal range of motion  Neurological: He is alert and oriented to person, place, and time  Skin: Skin is warm and dry  Healing graft site left thigh   Psychiatric: He has a normal mood and affect     Nursing note and vitals reviewed  Vitals: Blood pressure 126/70, pulse 89, height 6' 4" (1 93 m), SpO2 98 %  Wt Readings from Last 3 Encounters:   06/04/21 106 kg (233 lb)   05/11/21 97 5 kg (215 lb)   05/03/21 96 8 kg (213 lb 6 4 oz)         Labs & Results:  Lab Results   Component Value Date    WBC 8 36 06/07/2021    HGB 12 4 06/07/2021    HCT 40 6 06/07/2021    MCV 97 06/07/2021     06/07/2021     No results found for: BNP  No components found for: CHEM  Troponin I   Date Value Ref Range Status   02/16/2021 <0 02 <=0 04 ng/mL Final     Comment:     Siemens Chemistry analyzer 99% cutoff is > 0 04 ng/mL in network labs     o cTnI 99% cutoff is useful only when applied to patients in the clinical setting of myocardial ischemia   o cTnI 99% cutoff should be interpreted in the context of clinical history, ECG findings and possibly cardiac imaging to establish correct diagnosis  o cTnI 99% cutoff may be suggestive but clearly not indicative of a coronary event without the clinical setting of myocardial ischemia  11/06/2018 <0 02 <=0 04 ng/mL Final     Comment:       Siemens Chemistry analyzer 99% cutoff is > 0 04 ng/mL in network labs     o cTnI 99% cutoff is useful only when applied to patients in the clinical setting of myocardial ischemia   o cTnI 99% cutoff should be interpreted in the context of clinical history, ECG findings and possibly cardiac imaging to establish correct diagnosis  o cTnI 99% cutoff may be suggestive but clearly not indicative of a coronary event without the clinical setting of myocardial ischemia         Results for orders placed during the hospital encounter of 11/06/18   Echo complete with contrast if indicated    Narrative [address]  [phone]    Transthoracic Echocardiogram  2D, M-mode, Doppler, and Color Doppler    Study date:  07-Nov-2018    Patient: Kaitlin Dailey  MR number: [id number]  Account number: [id number]  : 1950  Age: 79 years  Gender: Male  Status: Inpatient  Location: Bedside  Height: 76 in  Weight: 238 lb  BP: 143/ 112 mmHg    Indications: Atrial fibrillation  Diagnoses: I48 0 - Atrial fibrillation    Sonographer:  JULIAN Balderrama  Primary Physician:  Diamante Edwards MD  Referring Physician:  Danielle Perales DO  Group:  [id number]  Interpreting Physician:  Dawit Desai MD    SUMMARY    LEFT VENTRICLE:  Systolic function was normal  Ejection fraction was estimated to be 55 %  There were no regional wall motion abnormalities  Wall thickness was mildly increased  LEFT ATRIUM:  The atrium was mildly dilated  RIGHT ATRIUM:  The atrium was mildly dilated  IVC, HEPATIC VEINS:  The inferior vena cava was mildly dilated  HISTORY: PRIOR HISTORY: Risk factors: hypertension, diabetes, and a former history of cigarette use (quitting more than one month ago)  PROCEDURE: The procedure was performed at the bedside  This was a routine study  The transthoracic approach was used  The study included complete 2D imaging, M-mode, complete spectral Doppler, and color Doppler  The heart rate was 90 bpm,  at the start of the study  Image quality was adequate  LEFT VENTRICLE: Size was normal  Systolic function was normal  Ejection fraction was estimated to be 55 %  There were no regional wall motion abnormalities  Wall thickness was mildly increased  DOPPLER: Transmitral flow pattern: atrial  fibrillation  RIGHT VENTRICLE: The size was normal  Systolic function was normal     LEFT ATRIUM: The atrium was mildly dilated  RIGHT ATRIUM: The atrium was mildly dilated  MITRAL VALVE: Valve structure was normal  There was normal leaflet separation  DOPPLER: The transmitral velocity was within the normal range  There was no evidence for stenosis  There was no significant regurgitation  AORTIC VALVE: The valve was trileaflet   Leaflets exhibited mildly increased thickness, normal cuspal separation, and sclerosis  DOPPLER: Transaortic velocity was within the normal range  There was no evidence for stenosis  There was no  regurgitation  TRICUSPID VALVE: The valve structure was normal  There was normal leaflet separation  DOPPLER: The transtricuspid velocity was within the normal range  There was no evidence for stenosis  There was no significant regurgitation  The  tricuspid jet envelope definition was inadequate for estimation of RV systolic pressure  PULMONIC VALVE: Leaflets exhibited normal thickness, no calcification, and normal cuspal separation  DOPPLER: The transpulmonic velocity was within the normal range  There was no significant regurgitation  PERICARDIUM: There was no pericardial effusion  AORTA: The root exhibited normal size  SYSTEMIC VEINS: IVC: The inferior vena cava was mildly dilated  SYSTEM MEASUREMENT TABLES    2D  %FS: 38 55 %  Ao Diam: 3 58 cm  EDV(Teich): 169 9 ml  EF(Teich): 68 07 %  ESV(Teich): 54 26 ml  IVSd: 1 07 cm  LA Area: 24 56 cm2  LA Diam: 4 24 cm  LVEDV MOD A4C: 138 09 ml  LVEF MOD A4C: 52 95 %  LVESV MOD A4C: 64 97 ml  LVIDd: 5 85 cm  LVIDs: 3 6 cm  LVLd A4C: 8 81 cm  LVLs A4C: 7 53 cm  LVPWd: 1 12 cm  RA Area: 25 78 cm2  RVIDd: 3 91 cm  SV MOD A4C: 73 12 ml  SV(Teich): 115 65 ml    [employer] Accredited Echocardiography Laboratory    Prepared and electronically signed by    Rufus Mullins MD  Signed [id number] 12:58:05       No results found for this or any previous visit  This note was completed in part utilizing [employer] direct voice recognition software  Grammatical errors, random word insertion, spelling mistakes, and incomplete sentences may be an occasional consequence of the system secondary to software limitations, ambient noise and hardware issues  At the time of dictation, efforts were made to edit, clarify and /or correct errors    Please read the chart carefully and recognize, using context, where substitutions have occurred    If you have any questions or concerns about the context, text or information contained within the body of this dictation, please contact myself, the provider, for further clarification

## 2021-06-09 NOTE — PATIENT INSTRUCTIONS
DASH Eating Plan   WHAT YOU NEED TO KNOW:   The DASH (Dietary Approaches to Stop Hypertension) Eating Plan is designed to help prevent or lower high blood pressure  It can also help to lower LDL (bad) cholesterol and decrease your risk of heart disease  The plan is low in sodium, sugar, unhealthy fats, and total fat  It is high in potassium, calcium, magnesium, and fiber  These nutrients are added when you eat more fruits, vegetables, and whole grains  DISCHARGE INSTRUCTIONS:   Your sodium limit each day: Your dietitian will tell you how much sodium is safe for you to have each day  People with high blood pressure should have no more than 1,500 to 2,300 mg of sodium in a day  A teaspoon (tsp) of salt has 2,300 mg of sodium  This may seem like a difficult goal, but small changes to the foods you eat can make a big difference  Your healthcare provider or dietitian can help you create a meal plan that follows your sodium limit  How to limit sodium:   · Read food labels  Food labels can help you choose foods that are low in sodium  The amount of sodium is listed in milligrams (mg)  The % Daily Value (DV) column tells you how much of your daily needs are met by 1 serving of the food for each nutrient listed  Choose foods that have less than 5% of the DV of sodium  These foods are considered low in sodium  Foods that have 20% or more of the DV of sodium are considered high in sodium  Avoid foods that have more than 300 mg of sodium in each serving  Choose foods that say low-sodium, reduced-sodium, or no salt added on the food label  · Avoid salt  Do not salt food at the table, and add very little salt to foods during cooking  Use herbs and spices, such as onions, garlic, and salt-free seasonings to add flavor to foods  Try lemon or lime juice or vinegar to give foods a tart flavor  Use hot peppers or a small amount of hot pepper sauce to add a spicy flavor to foods  · Ask about salt substitutes    Ask your healthcare provider if you may use salt substitutes  Some salt substitutes have ingredients that can be harmful if you have certain health conditions  · Choose foods carefully at restaurants  Meals from restaurants, especially fast food restaurants, are often high in sodium  Some restaurants have nutrition information that tells you the amount of sodium in their foods  Ask to have your food prepared with less, or no salt  What you need to know about fats:   · Include healthy fats  Examples are unsaturated fats and omega-3 fatty acids  Unsaturated fats are found in soybean, canola, olive, or sunflower oil, and liquid and soft tub margarines  Omega-3 fatty acids are found in fatty fish, such as salmon, tuna, mackerel, and sardines  It is also found in flaxseed oil and ground flaxseed  · Avoid unhealthy fats  Do not eat unhealthy fats, such as saturated fats and trans fats  Saturated fats are found in foods that contain fat from animals  Examples are fatty meats, whole milk, butter, cream, and other dairy foods  It is also found in shortening, stick margarine, palm oil, and coconut oil  Trans fats are found in fried foods, crackers, chips, and baked goods made with margarine or shortening  Foods to include: With the DASH eating plan, you need to eat a certain number of servings from each food group  This will help you get enough of certain nutrients and limit others  The amount of servings you should eat depends on how many calories you need  Your dietitian can tell you how many calories you need  The number of servings listed next to the food groups below are for people who need about 2,000 calories each day  · Grains:  6 to 8 servings (3 of these servings should be whole-grain foods)    ? 1 slice of whole-grain bread     ? 1 ounce of dry cereal    ? ½ cup of cooked cereal, pasta, or brown rice    · Vegetables and fruits:  4 to 5 servings of fruits and 4 to 5 servings of vegetables    ?  1 medium fruit    ? ½ cup of frozen, canned (no added salt), or chopped fresh vegetables     ? ½ cup of fresh, frozen, dried, or canned fruit (canned in light syrup or fruit juice)    ? ½ cup of vegetable or fruit juice    · Dairy:  2 to 3 servings    ? 1 cup of nonfat (skim) or 1% milk    ? 1½ ounces of fat-free or low-fat cheese    ? 6 ounces of nonfat or low-fat yogurt    · Lean meat, poultry, and fish:  6 ounces or less    ? Poultry (chicken, turkey) with no skin    ? Fish (especially fatty fish, such as salmon, fresh tuna, or mackerel)    ? Lean beef and pork (loin, round, extra lean hamburger)    ? Egg whites and egg substitutes    · Nuts, seeds, and legumes:  4 to 5 servings each week    ? ½ cup of cooked beans and peas    ? 1½ ounces of unsalted nuts    ? 2 tablespoons of peanut butter or seeds    · Sweets and added sugars:  5 or less each week    ? 1 tablespoon of sugar, jelly, or jam    ? ½ cup of sorbet or gelatin    ? 1 cup of lemonade    · Fats:  2 to 3 servings each week    ? 1 teaspoon of soft margarine or vegetable oil    ? 1 tablespoon of mayonnaise    ? 2 tablespoons of salad dressing    Foods to avoid:   · Grains:      ? Baked goods, such as doughnuts, pastries, cookies, and biscuits (high in fat and sugar)    ? Mixes for cornbread and biscuits, packaged foods, such as bread stuffing, rice and pasta mixes, macaroni and cheese, and instant cereals (high in sodium)    · Fruits and vegetables:      ? Regular, canned vegetables (high in sodium)    ? Sauerkraut, pickled vegetables, and other foods prepared in brine (high in sodium)    ? Fried vegetables or vegetables in butter or high-fat sauces    ? Fruit in cream or butter sauce (high in fat)    · Dairy:      ? Whole milk, 2% milk, and cream (high in fat)    ?  Regular cheese and processed cheese (high in fat and sodium)    · Meats and protein foods:      ? Smoked or cured meat, such as corned beef, hedrick, ham, hot dogs, and sausage (high in fat and sodium)    ? Canned beans and canned meats or spreads, such as potted meats, sardines, anchovies, and imitation seafood (high in sodium)    ? Deli or lunch meats, such as bologna, ham, turkey, and roast beef (high in sodium)    ? High-fat meat (T-bone steak, regular hamburger, and ribs)    ? Whole eggs and egg yolks (high in fat)    · Other:      ? Seasonings made with salt, such as garlic salt, celery salt, onion salt, seasoned salt, meat tenderizers, and monosodium glutamate (MSG)    ? Miso soup and canned or dried soup mixes (high in sodium)    ? Regular soy sauce, barbecue sauce, teriyaki sauce, steak sauce, Worcestershire sauce, and most flavored vinegars (high in sodium)    ? Regular condiments, such as mustard, ketchup, and salad dressings (high in sodium)    ? Gravy and sauces, such as Kenan or cheese sauces (high in sodium and fat)    ? Drinks high in sugar, such as soda or fruit drinks    ? Snack foods, such as salted chips, popcorn, pretzels, pork rinds, salted crackers, and salted nuts    ? Frozen foods, such as dinners, entrees, vegetables with sauces, and breaded meats (high in sodium)    Other guidelines to follow:   · Maintain a healthy weight  Your risk for heart disease is higher if you are overweight  Your healthcare provider may suggest that you lose weight if you are overweight  You can lose weight by eating fewer calories and foods that have added sugars and fat  The DASH meal plan can help you do this  Decrease calories by eating smaller portions at each meal and fewer snacks  Ask your healthcare provider for more information about how to lose weight  · Exercise regularly  Regular exercise can help you reach or maintain a healthy weight  Regular exercise can also help decrease your blood pressure and improve your cholesterol levels  Get 30 minutes or more of moderate exercise each day of the week  To lose weight, get at least 60 minutes of exercise   Talk to your healthcare provider about the best exercise program for you  · Limit alcohol  Women should limit alcohol to 1 drink a day  Men should limit alcohol to 2 drinks a day  A drink of alcohol is 12 ounces of beer, 5 ounces of wine, or 1½ ounces of liquor  © Copyright [address] Information is for End User's use only and may not be sold, redistributed or otherwise used for commercial purposes  All illustrations and images included in CareNotes® are the copyrighted property of A D A M , Inc  or [facility] Brittanie Blunt   The above information is an  only  It is not intended as medical advice for individual conditions or treatments  Talk to your doctor, nurse or pharmacist before following any medical regimen to see if it is safe and effective for you

## 2021-06-18 ENCOUNTER — OFFICE VISIT (OUTPATIENT)
Dept: WOUND CARE | Facility: HOSPITAL | Age: 71
End: 2021-06-18
Payer: COMMERCIAL

## 2021-06-18 VITALS
HEART RATE: 87 BPM | SYSTOLIC BLOOD PRESSURE: 176 MMHG | DIASTOLIC BLOOD PRESSURE: 88 MMHG | TEMPERATURE: 97.5 F | RESPIRATION RATE: 16 BRPM

## 2021-06-18 DIAGNOSIS — E11.621 DIABETIC ULCER OF RIGHT MIDFOOT ASSOCIATED WITH TYPE 2 DIABETES MELLITUS, WITH FAT LAYER EXPOSED (HCC): ICD-10-CM

## 2021-06-18 DIAGNOSIS — T81.32XD DEEP DISRUPTION OR DEHISCENCE OF OPERATION WOUND, SUBSEQUENT ENCOUNTER: Primary | ICD-10-CM

## 2021-06-18 DIAGNOSIS — L97.412 DIABETIC ULCER OF RIGHT MIDFOOT ASSOCIATED WITH TYPE 2 DIABETES MELLITUS, WITH FAT LAYER EXPOSED (HCC): ICD-10-CM

## 2021-06-18 PROCEDURE — 11042 DBRDMT SUBQ TIS 1ST 20SQCM/<: CPT | Performed by: PODIATRIST

## 2021-06-18 RX ORDER — LIDOCAINE HYDROCHLORIDE 40 MG/ML
5 SOLUTION TOPICAL ONCE
Status: COMPLETED | OUTPATIENT
Start: 2021-06-18 | End: 2021-06-18

## 2021-06-18 RX ADMIN — LIDOCAINE HYDROCHLORIDE 5 ML: 40 SOLUTION TOPICAL at 09:56

## 2021-06-18 NOTE — PROGRESS NOTES
Patient ID: Bean Sandoval  is a 73 y o  male Date of Birth 1950     My role is Foot, Ankle and Wound Specialist    Chief Complaint   Patient presents with    Follow Up Wound Care Visit       Foot wound    Subjective:   Bruna Rinne is now home  He's mostly NWB  He had his free flap and hardware removal  He now has VAC to the hardware removal site  Sutures have been removed         The following portions of the patient's history were reviewed and updated as appropriate:   Patient Active Problem List   Diagnosis    Hyperlipidemia    Type 2 diabetes mellitus with diabetic neuropathy ([address] )    Atrial fibrillation with RVR [facility]    Right middle lobe pulmonary nodule    Essential hypertension    Acquired absence of other right toe(s) ([address] )    Benign prostatic hyperplasia with lower urinary tract symptoms    Positive for microalbuminuria    Deep disruption or dehiscence of operation wound    Post-operative state    Aftercare for amputation stump    Ambulatory dysfunction    Diabetic ulcer of right 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, with fat layer exposed ([address] )    S/P transmetatarsal amputation of foot, right ([facility] [address] )    Sleep disturbance    Acute osteomyelitis of right ankle or foot ([address] )    Anxiety    S/P R ALT flap to right foot     Past Medical History:   Diagnosis Date    Arthritis     Atrial fibrillation ([address] )     Diagnosed 11/2018    Benign prostate hyperplasia 04/2002    Cellulitis     right lower leg     Colon polyp 2006    Diabetes mellitus ([address] )     Hearing aid worn     bilat    Hearing loss     90% loss left ear and 40% right ear    History of clubfoot     "since birth"    History of pneumonia     History of TIA (transient ischemic attack) 04/25/2017 11/30/18 pt denies    Hyperlipidemia 5/15/2014    Hypertension     Infectious viral hepatitis     "cant remember type"    Irregular heart beat     Neuropathy     both feet    Osteomyelitis ([address] )     right great toe    Right middle lobe pulmonary nodule 11/6/2018    Seasickness     Teeth missing     Type 2 diabetes mellitus with diabetic neuropathy ([facility] [address] ) 11/6/2018    Wears glasses     reading    Wound, open     bottom of right foot     Past Surgical History:   Procedure Laterality Date    BUNIONECTOMY Right 12/4/2018    Procedure: 5TH METATARSAL BONE PARTIAL RESECTION, FULL THICKNESS DEBRIDEMENT OF DIABETIC ULCER;  Surgeon: Carlon Dakin, DPM;  [facility];  Service: Podiatry    CLUB FOOT RELEASE Bilateral     COLONOSCOPY      COMPLEX WOUND CLOSURE TO EXTREMITY  4/27/2021    Procedure: COMPLEX WOUND CLOSURE TO EXTREMITY: RIGHT FOOT;  Surgeon: Ava Wells MD;  [facility];  Service: Plastics    EXTERNAL FIXATOR APPLICATION Right 9/86/5209    Procedure: Application multiplane external fixation;  Surgeon: Ernie Lopez DPM;  [facility];  Service: Podiatry    FOOT HARDWARE REMOVAL Right 2/17/2021    Procedure: REMOVAL EXTERNAL FIXATOR;  Surgeon: Ernie Lopez DPM;  [facility];  Service: Podiatry    FOREIGN BODY REMOVAL Right 4/27/2021    Procedure: REMOVAL FOREIGN BODY EXTREMITY: RIGHT FOOT;  Surgeon: Ava Wells MD;  [facility];  Service: Plastics    INCISION AND DRAINAGE OF WOUND Right 2/17/2021    Procedure: INCISION AND DRAINAGE (I&D) EXTREMITY;  Surgeon: Ernie Lopez DPM;  [facility];  Service: Podiatry    ORIF FOOT FRACTURE Right 3/4/2021    Procedure: VAC PLACEMENT; SCREW FIXATION RIGHT FOOT FUSION;  Surgeon: Ernie Lopez DPM;  [facility];  Service: Podiatry    [address] [place] [address] Right 2/22/2021    Procedure: AMPUTATION TRANSMETATARSAL (TMA), REMOVAL NAIL IM T2 ICF HARDWARE;  Surgeon: Ernie Lopez DPM;  [facility];  Service: Podiatry    NE FUSION FOOT BONES,MIDTARSAL,OSTEOTMY Right 2/12/2021    Procedure: foot ARTHRODESIS/FUSION;  Surgeon: Ernie Lopez DPM;  [facility];  Service: Podiatry    IL LENGTH/SHORT LEG/ANKL TENDON,SINGLE Right 2/12/2021    Procedure: LENGTHEN TIBIAL TENDON, TRANS HEEL CORD;  Surgeon: Sushant Delgado DPM;  [facility];  Service: Podiatry    IL MUSCLE-SKIN FLAP,TRUNK Right 4/12/2021    Procedure: RECONSTRUCTION MICROSURGICAL W/ FREE FLAP;  Surgeon: Manjula Espinoza MD;  [facility];  Service: Plastics    IL MUSCLE-SKIN FLAP,TRUNK Right 4/12/2021    Procedure: TAKEBACK RECONSTRUCTION MICROSURGICAL W/ FREE FLAP;  Surgeon: Manjula Espinoza MD;  [facility];  Service: Plastics    IL MUSCLE-SKIN FLAP,TRUNK Right 4/13/2021    Procedure: RECONSTRUCTION MICROSURGICAL W/ FREE FLAP, RE EXPLORATION, MICRO VASCULAR REVISION;  Surgeon: Manjula Espinoza MD;  [facility];  Service: Plastics    IL PART [address] [facility] BONE METATARSAL HEAD,EA Right 12/29/2020    Procedure: EXCISION EXOSTOSIS;  Surgeon: Sushant Delgado DPM;  [facility];  Service: [facility] SURG WND EXTEN/COMPLIC Right 07/77/4282    Procedure: PRIMARY DELAYED CLOSURE;  Surgeon: Sushant Delgado DPM;  [facility];  Service: Podiatry    TOE AMPUTATION Right     partial great toe    TONSILLECTOMY      VAC DRESSING APPLICATION Right 0/6/7314    Procedure: APPLICATION VAC DRESSING EXTREMITY;  Surgeon: Manjula Espinoza MD;  [facility];  Service: Plastics    [facility] Right 3/1/2021    Procedure: DEBRIDEMENT LOWER EXTREMITY ([address] OUT); Surgeon: Manjula Espinoza MD;  [facility];  Service: Plastics    WOUND DEBRIDEMENT Right 4/7/2021    Procedure: DEBRIDEMENT RIGHT FOOT;  Surgeon: Manjula Espinoza MD;  [facility];  Service: Plastics    WOUND DEBRIDEMENT Right 4/12/2021    Procedure: DEBRIDEMENT LOWER EXTREMITY [facility] OUT);   Surgeon: Manjula Espinoza MD;  [facility];  Service: Plastics    WOUND DEBRIDEMENT Right 4/27/2021 Procedure: DEBRIDEMENT LOWER EXTREMITY [facility] OUT): RIGHT FOOT;  Surgeon: Elizabeth Antoine MD;  [facility];  Service: Plastics     Social History     Socioeconomic History    Marital status: /Civil Union     Spouse name: None    Number of children: None    Years of education: None    Highest education level: None   Occupational History    None   Tobacco Use    Smoking status: Former Smoker     Types: Cigarettes     Quit date: 1988     Years since quittin 6    Smokeless tobacco: Former User    Tobacco comment: exposure to passive smoke   Vaping Use    Vaping Use: Never used   Substance and Sexual Activity    Alcohol use: Yes     Comment: socially    Drug use: Not Currently    Sexual activity: Yes     Partners: Female   Other Topics Concern    None   Social History Narrative    None     Social Determinants of Health     Financial Resource Strain:     Difficulty of Paying Living Expenses:    Food Insecurity:     Worried About Running Out of Food in the Last Year:     Ran Out of Food in the Last Year:    Transportation Needs:     Lack of Transportation (Medical):      Lack of Transportation (Non-Medical):    Physical Activity:     Days of Exercise per Week:     Minutes of Exercise per Session:    Stress:     Feeling of Stress :    Social Connections:     Frequency of Communication with Friends and Family:     Frequency of Social Gatherings with Friends and Family:     Attends [employer] Services:     Active Member of Clubs or Organizations:     Attends Club or Organization Meetings:     Marital Status:    Intimate Partner Violence:     Fear of Current or Ex-Partner:     Emotionally Abused:     Physically Abused:     Sexually Abused:         Current Outpatient Medications:     acetaminophen (Mapap Arthritis Pain) 650 mg CR tablet, Take 1 tablet (650 mg total) by mouth 2 (two) times a day (Patient not taking: Reported on 2021), Disp: 60 tablet, Rfl: 1    aspirin 325 mg tablet, Take 1 tablet (325 mg total) by mouth daily, Disp:  , Rfl: 0    atorvastatin (LIPITOR) 40 mg tablet, Take 1 tablet (40 mg total) by mouth daily, Disp: 90 tablet, Rfl: 3    Blood Glucose Monitoring Suppl (ACCU-CHEK JYOTI SMARTVIEW) w/Device KIT, use to check Blood Sugar as needed, Disp: , Rfl:     diltiazem (CARDIZEM) 90 mg tablet, Take 1 tablet (90 mg total) by mouth 2 (two) times a day, Disp: 60 tablet, Rfl: 1    docusate sodium (COLACE) 100 mg capsule, Take 1 capsule (100 mg total) by mouth 2 (two) times a day (Patient not taking: Reported on 6/3/2021), Disp: 10 capsule, Rfl: 0    Eliquis 5 MG, Take 1 tablet (5 mg total) by mouth 2 (two) times a day, Disp: 60 tablet, Rfl: 2    glimepiride (AMARYL) 2 mg tablet, Take 1 tablet (2 mg total) by mouth 2 (two) times a day, Disp: 60 tablet, Rfl: 1    glucose blood (Accu-Chek SmartView) test strip, Use as instructed BID, Disp: 200 each, Rfl: 1    Januvia 100 MG tablet, Take 1 tablet (100 mg total) by mouth daily, Disp: 132 tablet, Rfl: 3    lisinopril (ZESTRIL) 40 mg tablet, Take 1 tablet (40 mg total) by mouth daily, Disp: 90 tablet, Rfl: 3    melatonin 3 mg, Take 1 tablet (3 mg total) by mouth daily at bedtime (Patient not taking: Reported on 6/9/2021), Disp: , Rfl: 0    metFORMIN (GLUCOPHAGE) 500 mg tablet, Take 1 tablet (500 mg total) by mouth daily with breakfast, Disp: 90 tablet, Rfl: 6    metoprolol succinate (TOPROL-XL) 25 mg 24 hr tablet, Take (3) Tablets daily by mouth PO BID, Disp: 60 tablet, Rfl: 12    polyethylene glycol (MIRALAX) 17 g packet, Take 17 g by mouth daily (Patient not taking: Reported on 6/9/2021), Disp:  , Rfl: 0    Sodium Chloride Flush (NORMAL SALINE FLUSH IV), Infuse 10 mL into a venous catheter 4 (four) times a day, Disp: , Rfl:   No current facility-administered medications for this visit    Family History   Problem Relation Age of Onset    Diabetes Father         mellitus      Review of Systems Constitutional: Positive for activity change  Negative for appetite change, chills, diaphoresis, fatigue, fever and unexpected weight change  Skin: Positive for wound  Allergies  Simvastatin and Itraconazole    Objective:  BP (!) 176/88 Comment: did not take morning meds  Pulse 87   Temp 97 5 °F (36 4 °C)   Resp 16     Physical Exam  Vitals and nursing note reviewed  Constitutional:       General: He is not in acute distress  Appearance: Normal appearance  He is not ill-appearing, toxic-appearing or diaphoretic  Cardiovascular:      Pulses: Normal pulses  Heart sounds: Normal heart sounds  Pulmonary:      Effort: Pulmonary effort is normal       Breath sounds: Normal breath sounds  Musculoskeletal:      Comments: TMA Right foot   Skin:     Comments: See Wound Assessment  Neurological:      Mental Status: He is alert  Wound 06/04/21 Diabetic Ulcer Foot Anterior;Right (Active)   Wound Image   06/18/21 1025   Wound Description Pink;Probes to bone;Slough; Yellow;Epithelialization;Granulation tissue 06/18/21 0951   [place]-wound Assessment Scar Tissue;Dry; Intact; Maceration 06/18/21 0951   Wound Length (cm) 0 7 cm 06/18/21 0951   Wound Width (cm) 20 5 cm 06/18/21 0951   Wound Depth (cm) 1 cm 06/18/21 0951   Wound Surface Area (cm^2) 14 35 cm^2 06/18/21 0951   Wound Volume (cm^3) 14 35 cm^3 06/18/21 0951   Calculated Wound Volume (cm^3) 14 35 cm^3 06/18/21 0951   Change in Wound Size % 54 01 06/18/21 0951   Drainage Amount Moderate 06/18/21 0951   Drainage Description Serosanguineous 06/18/21 0951   Non-staged Wound Description Full thickness 06/18/21 0951   Dressing Wound V A C  06/18/21 0951           Wound 06/04/21 Diabetic Ulcer Foot Anterior;Right (Active)   Wound Image   06/18/21 1025   Wound Description Pink;Probes to bone;Slough; Yellow;Epithelialization;Granulation tissue 06/18/21 0951   [place]-wound Assessment Scar Tissue;Dry; Intact; Maceration 06/18/21 0951   Wound Length (cm) 0 7 cm 06/18/21 0951   Wound Width (cm) 20 5 cm 06/18/21 0951   Wound Depth (cm) 1 cm 06/18/21 0951   Wound Surface Area (cm^2) 14 35 cm^2 06/18/21 0951   Wound Volume (cm^3) 14 35 cm^3 06/18/21 0951   Calculated Wound Volume (cm^3) 14 35 cm^3 06/18/21 0951   Change in Wound Size % 54 01 06/18/21 0951   Drainage Amount Moderate 06/18/21 0951   Drainage Description Serosanguineous 06/18/21 0951   Non-staged Wound Description Full thickness 06/18/21 0951   Dressing Wound V A C  06/18/21 0951                         Diagnosis:  1  Deep disruption or dehiscence of operation wound, subsequent encounter  -     lidocaine (XYLOCAINE) 4 % topical solution 5 mL  -     Wound negative pressure wound therapy; Future  -     Wound home care; Future    2  Diabetic ulcer of right 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, with fat layer exposed ([address] )  -     lidocaine (XYLOCAINE) 4 % topical solution 5 mL  -     Wound negative pressure wound therapy; Future  -     Wound home care; Future        Diagnosis ICD-10-CM Associated Orders   1  Deep disruption or dehiscence of operation wound, subsequent encounter  T81  32XD lidocaine (XYLOCAINE) 4 % topical solution 5 mL     Wound negative pressure wound therapy     Wound home care   2  Diabetic ulcer of right 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, with fat layer exposed ([address] )  E11 621 lidocaine (XYLOCAINE) 4 % topical solution 5 mL    [id number] Wound negative pressure wound therapy     Wound home care        ASSESSMENT    Problems:    Chronic illness / Problem not at goal with exacerbation, progression or side effects of treatment  1  Surgical wound          PLAN    Discussion of Management /  Recommendations  The wound appears to be responding well to the [facility]  Continue 125mmHg continuous with black granufoam      Procedures Performed  Sharp debridement performed         Debridement   Wound 06/04/21 Diabetic Ulcer Foot Anterior;Right    Universal Protocol:  Consent: Verbal consent obtained  Risks and benefits: risks, benefits and alternatives were discussed  Consent given by: patient  Time out: Immediately prior to procedure a "time out" was called to verify the correct patient, procedure, equipment, support staff and site/side marked as required    Timeout called at: 6/18/2021 10:52 AM   Patient understanding: patient states understanding of the procedure being performed  Patient identity confirmed: verbally with patient      Performed by: physician  Debridement type: surgical  Level of debridement: subcutaneous tissue  Pain control: lidocaine 4%  Pre-debridement measurements  Length (cm): 0 7  Width (cm): 20 5  Depth (cm): 1  Surface Area (cm^2): 14 35  Volume (cm^3): 14 35    Post-debridement measurements  Length (cm): 0 7  Width (cm): 20 5  Depth (cm): 1  Percent debrided: 10%  Surface Area (cm^2): 14 35  Area debrided (cm^2): 1 44  Volume (cm^3): 14 35  Tissue and other material debrided: subcutaneous tissue  Devitalized tissue debrided: biofilm and fibrin  Instrument(s) utilized: curette  Bleeding: small  Hemostasis obtained with: pressure  Procedural pain (0-10): insensate  Post-procedural pain: insensate   Response to treatment: procedure was tolerated well

## 2021-06-18 NOTE — PATIENT INSTRUCTIONS
Orders Placed This Encounter   Procedures    Wound negative pressure wound therapy     Right medial foot open area  Apply Wound VAC to MEDIAL open are ONLY  Negative Pressure Wound Therapy Instructions Apply black granufoam to wound bed, DO NOT bridge  Set negative pressure on continuous at 125 mmHG,   VAC dressing to be changed three times per week as ordered  Other: Drape margins 1-2 inches on MEDIAL aspect of foot ONLY (Dr Danis Hayward does not want VAC film uses in excess  It must be limited to medial open periwound ONLY)  Use stomahesive strip paste to assist with seal on suture line  Right lateral and plantar foot open area: Wash your hands with soap and water  Remove old dressing, discard into plastic bag and place in trash  Cleanse the wound with normal saline solution prior to applying a clean dressing  Do not use tissue or cotton balls  Do not scrub the wound  Pat dry using gauze  Shower no   Apply silver alginate to open areas  Cover with gauze 4x4 (NO VAC TO THIS AREA) Secure with ava and tape  Change dressing 3 times per week  Off-loading Instructions: Wear off-loading device as directed by your physician  Put on immediately when rising in the morning and remove when going to bed  Wedge shoe       Standing Status:   Future     Standing Expiration Date:   6/18/2022    Wound home care     Continue visiting nurses for dressing changes     Standing Status:   Future     Standing Expiration Date:   6/18/2022

## 2021-06-22 PROBLEM — E11.621 DIABETIC ULCER OF RIGHT MIDFOOT ASSOCIATED WITH TYPE 2 DIABETES MELLITUS, WITH FAT LAYER EXPOSED (HCC): Status: RESOLVED | Noted: 2021-03-16 | Resolved: 2021-06-22

## 2021-06-22 PROBLEM — L97.412 DIABETIC ULCER OF RIGHT MIDFOOT ASSOCIATED WITH TYPE 2 DIABETES MELLITUS, WITH FAT LAYER EXPOSED (HCC): Status: RESOLVED | Noted: 2021-03-16 | Resolved: 2021-06-22

## 2021-06-23 ENCOUNTER — OFFICE VISIT (OUTPATIENT)
Dept: FAMILY MEDICINE CLINIC | Facility: CLINIC | Age: 71
End: 2021-06-23
Payer: COMMERCIAL

## 2021-06-23 VITALS
SYSTOLIC BLOOD PRESSURE: 156 MMHG | DIASTOLIC BLOOD PRESSURE: 82 MMHG | OXYGEN SATURATION: 100 % | TEMPERATURE: 98.1 F | HEART RATE: 75 BPM

## 2021-06-23 DIAGNOSIS — N40.1 BENIGN PROSTATIC HYPERPLASIA WITH WEAK URINARY STREAM: Chronic | ICD-10-CM

## 2021-06-23 DIAGNOSIS — I48.91 ATRIAL FIBRILLATION WITH RVR (HCC): ICD-10-CM

## 2021-06-23 DIAGNOSIS — M86.171 ACUTE OSTEOMYELITIS OF RIGHT ANKLE OR FOOT (HCC): Primary | ICD-10-CM

## 2021-06-23 DIAGNOSIS — E78.5 HYPERLIPIDEMIA, UNSPECIFIED HYPERLIPIDEMIA TYPE: Chronic | ICD-10-CM

## 2021-06-23 DIAGNOSIS — E11.40 TYPE 2 DIABETES MELLITUS WITH DIABETIC NEUROPATHY, WITHOUT LONG-TERM CURRENT USE OF INSULIN (HCC): Chronic | ICD-10-CM

## 2021-06-23 DIAGNOSIS — Z89.431 S/P TRANSMETATARSAL AMPUTATION OF FOOT, RIGHT (HCC): ICD-10-CM

## 2021-06-23 DIAGNOSIS — Z98.890 S/P FLAP GRAFT: ICD-10-CM

## 2021-06-23 DIAGNOSIS — R39.12 BENIGN PROSTATIC HYPERPLASIA WITH WEAK URINARY STREAM: Chronic | ICD-10-CM

## 2021-06-23 DIAGNOSIS — E11.9 TYPE 2 DIABETES MELLITUS WITHOUT COMPLICATION, UNSPECIFIED WHETHER LONG TERM INSULIN USE (HCC): ICD-10-CM

## 2021-06-23 DIAGNOSIS — I10 ESSENTIAL HYPERTENSION: Chronic | ICD-10-CM

## 2021-06-23 PROCEDURE — 1101F PT FALLS ASSESS-DOCD LE1/YR: CPT | Performed by: FAMILY MEDICINE

## 2021-06-23 PROCEDURE — 1160F RVW MEDS BY RX/DR IN RCRD: CPT | Performed by: FAMILY MEDICINE

## 2021-06-23 PROCEDURE — 3288F FALL RISK ASSESSMENT DOCD: CPT | Performed by: FAMILY MEDICINE

## 2021-06-23 PROCEDURE — 3725F SCREEN DEPRESSION PERFORMED: CPT | Performed by: FAMILY MEDICINE

## 2021-06-23 PROCEDURE — 1036F TOBACCO NON-USER: CPT | Performed by: FAMILY MEDICINE

## 2021-06-23 PROCEDURE — G0439 PPPS, SUBSEQ VISIT: HCPCS | Performed by: FAMILY MEDICINE

## 2021-06-23 PROCEDURE — 99214 OFFICE O/P EST MOD 30 MIN: CPT | Performed by: FAMILY MEDICINE

## 2021-06-23 PROCEDURE — 1125F AMNT PAIN NOTED PAIN PRSNT: CPT | Performed by: FAMILY MEDICINE

## 2021-06-23 PROCEDURE — 1170F FXNL STATUS ASSESSED: CPT | Performed by: FAMILY MEDICINE

## 2021-06-23 RX ORDER — BLOOD SUGAR DIAGNOSTIC
STRIP MISCELLANEOUS
Qty: 200 EACH | Refills: 6 | Status: SHIPPED | OUTPATIENT
Start: 2021-06-23 | End: 2022-03-04 | Stop reason: CLARIF

## 2021-06-23 NOTE — PROGRESS NOTES
Assessment and Plan:     Problem List Items Addressed This Visit     None           Preventive health issues were discussed with patient, and age appropriate screening tests were ordered as noted in patient's After Visit Summary  Personalized health advice and appropriate referrals for health education or preventive services given if needed, as noted in patient's After Visit Summary       History of Present Illness:     Patient presents for Medicare Annual Wellness visit    Patient Care Team:  Altagracia Haywood MD as PCP - General (Family Medicine)     Problem List:     Patient Active Problem List   Diagnosis    Hyperlipidemia    Type 2 diabetes mellitus with diabetic neuropathy ([facility]    Atrial fibrillation with RVR ([facility] )    Right middle lobe pulmonary nodule    Essential hypertension    Acquired absence of other right toe(s) ([facility] )    Benign prostatic hyperplasia with lower urinary tract symptoms    Positive for microalbuminuria    Deep disruption or dehiscence of operation wound    Post-operative state    Aftercare for amputation stump    Ambulatory dysfunction    S/P transmetatarsal amputation of foot, right ([facility] )    Sleep disturbance    Acute osteomyelitis of right ankle or foot ([facility] )    Anxiety    S/P R ALT flap to right foot      Past Medical and Surgical History:     Past Medical History:   Diagnosis Date    Arthritis     Atrial fibrillation ([facility] )     Diagnosed 11/2018    Benign prostate hyperplasia 04/2002    Cellulitis     right lower leg     Colon polyp 2006    Diabetes mellitus ([facility] )     Hearing aid worn     bilat    Hearing loss     90% loss left ear and 40% right ear    History of clubfoot     "since birth"    History of pneumonia     History of TIA (transient ischemic attack) 04/25/2017 11/30/18 pt denies    Hyperlipidemia 5/15/2014    Hypertension     Infectious viral hepatitis     "cant remember type"    Irregular heart beat     Neuropathy     both feet    Osteomyelitis ([facility]     right great toe    Right middle lobe pulmonary nodule 11/6/2018    Seasickness     Teeth missing     Type 2 diabetes mellitus with diabetic neuropathy ([facility] ) 11/6/2018    Wears glasses     reading    Wound, open     bottom of right foot     Past Surgical History:   Procedure Laterality Date    BUNIONECTOMY Right 12/4/2018    Procedure: 5TH METATARSAL BONE PARTIAL RESECTION, FULL THICKNESS DEBRIDEMENT OF DIABETIC ULCER;  Surgeon: Emily Musa DPM;  [facility];  Service: Podiatry    CLUB FOOT RELEASE Bilateral     COLONOSCOPY      COMPLEX WOUND CLOSURE TO EXTREMITY  4/27/2021    Procedure: COMPLEX WOUND CLOSURE TO EXTREMITY: RIGHT FOOT;  Surgeon: Rosibel Duarte MD;  [facility];  Service: Plastics    EXTERNAL FIXATOR APPLICATION Right 8/79/3817    Procedure: Application multiplane external fixation;  Surgeon: Alverto Westbrook DPM;  [facility];  Service: Podiatry    FOOT HARDWARE REMOVAL Right 2/17/2021    Procedure: REMOVAL EXTERNAL FIXATOR;  Surgeon: Alverto Westbrook DPM;  [facility];  Service: Podiatry    FOREIGN BODY REMOVAL Right 4/27/2021    Procedure: REMOVAL FOREIGN BODY EXTREMITY: RIGHT FOOT;  Surgeon: Roisbel Duarte MD;  [facility];  Service: Plastics    INCISION AND DRAINAGE OF WOUND Right 2/17/2021    Procedure: INCISION AND DRAINAGE (I&D) EXTREMITY;  Surgeon: Alverto Westbrook DPM;  [facility];  Service: Podiatry    ORIF FOOT FRACTURE Right 3/4/2021    Procedure: VAC PLACEMENT; SCREW FIXATION RIGHT FOOT FUSION;  Surgeon: Alverto Westbrook DPM;  [facility];  Service: Podiatry    [address] [place] [address] Right 2/22/2021    Procedure: AMPUTATION TRANSMETATARSAL (TMA), REMOVAL NAIL IM T2 ICF HARDWARE;  Surgeon: Alverto Westbrook DPM;  [facility];  Service: Podiatry    MA FUSION FOOT BONES,MIDTARSAL,OSTEOTMY Right 2/12/2021    Procedure: foot ARTHRODESIS/FUSION; Surgeon: La Lambert DPM;  [facility];  Service: Podiatry    LA LENGTH/SHORT LEG/ANKL TENDON,SINGLE Right 2/12/2021    Procedure: LENGTHEN TIBIAL TENDON, TRANS HEEL CORD;  Surgeon: La Lambert DPM;  [facility];  Service: Podiatry    LA MUSCLE-SKIN FLAP,TRUNK Right 4/12/2021    Procedure: RECONSTRUCTION MICROSURGICAL W/ FREE FLAP;  Surgeon: Alise Gant MD;  [facility];  Service: Plastics    LA MUSCLE-SKIN FLAP,TRUNK Right 4/12/2021    Procedure: TAKEBACK RECONSTRUCTION MICROSURGICAL W/ FREE FLAP;  Surgeon: Alise Gant MD;  [facility];  Service: Plastics    LA MUSCLE-SKIN FLAP,TRUNK Right 4/13/2021    Procedure: RECONSTRUCTION MICROSURGICAL W/ FREE FLAP, RE EXPLORATION, MICRO VASCULAR REVISION;  Surgeon: Alise Gant MD;  [facility];  Service: Plastics    LA PART [address] [facility] BONE METATARSAL HEAD,EA Right 12/29/2020    Procedure: EXCISION EXOSTOSIS;  Surgeon: La Lambert DPM;  [facility];  Service: Charlsie Annamarie SURG WND EXTEN/COMPLIC Right 91/51/4411    Procedure: PRIMARY DELAYED CLOSURE;  Surgeon: La Lambert DPM;  [facility];  Service: Podiatry    TOE AMPUTATION Right     partial great toe    TONSILLECTOMY      VAC DRESSING APPLICATION Right 8/2/6401    Procedure: APPLICATION VAC DRESSING EXTREMITY;  Surgeon: Alise Gant MD;  [facility];  Service: Plastics    [facility] [address] [place] [address] DEBRIDEMENT Right 3/1/2021    Procedure: DEBRIDEMENT LOWER EXTREMITY ([address] [facility] Surgeon: Alise Gant MD;  [facility];  Service: Plastics    WOUND DEBRIDEMENT Right 4/7/2021    Procedure: DEBRIDEMENT RIGHT FOOT;  Surgeon: Alise Gant MD;  [facility];  Service: Plastics    WOUND DEBRIDEMENT Right 4/12/2021    Procedure: DEBRIDEMENT LOWER EXTREMITY [facility] OUT);   Surgeon: Alise Gant MD;  [facility]; Service: Plastics    WOUND DEBRIDEMENT Right 2021    Procedure: DEBRIDEMENT LOWER EXTREMITY [facility] OUT): RIGHT FOOT;  Surgeon: Kalli Vora MD;  [facility];  Service: Plastics      Family History:     Family History   Problem Relation Age of Onset    Diabetes Father         mellitus      Social History:     Social History     Socioeconomic History    Marital status: /Civil Union     Spouse name: None    Number of children: None    Years of education: None    Highest education level: None   Occupational History    None   Tobacco Use    Smoking status: Former Smoker     Types: Cigarettes     Quit date: 1988     Years since quittin 6    Smokeless tobacco: Former User    Tobacco comment: exposure to passive smoke   Vaping Use    Vaping Use: Never used   Substance and Sexual Activity    Alcohol use: Yes     Comment: socially    Drug use: Not Currently    Sexual activity: Yes     Partners: Female   Other Topics Concern    None   Social History Narrative    None     Social Determinants of Health     Financial Resource Strain:     Difficulty of Paying Living Expenses:    Food Insecurity:     Worried About Running Out of Food in the Last Year:     Ran Out of Food in the Last Year:    Transportation Needs:     Lack of Transportation (Medical):      Lack of Transportation (Non-Medical):    Physical Activity:     Days of Exercise per Week:     Minutes of Exercise per Session:    Stress:     Feeling of Stress :    Social Connections:     Frequency of Communication with Friends and Family:     Frequency of Social Gatherings with Friends and Family:     Attends Jain Services:     Active Member of Clubs or Organizations:     Attends Club or Organization Meetings:     Marital Status:    Intimate Partner Violence:     Fear of Current or Ex-Partner:     Emotionally Abused:     Physically Abused:     Sexually Abused:       Medications and Allergies: Current Outpatient Medications   Medication Sig Dispense Refill    acetaminophen (Mapap Arthritis Pain) 650 mg CR tablet Take 1 tablet (650 mg total) by mouth 2 (two) times a day 60 tablet 1    aspirin 325 mg tablet Take 1 tablet (325 mg total) by mouth daily  0    atorvastatin (LIPITOR) 40 mg tablet Take 1 tablet (40 mg total) by mouth daily 90 tablet 3    Blood Glucose Monitoring Suppl (ACCU-CHEK JYOTI SMARTVIEW) w/Device KIT use to check Blood Sugar as needed      diltiazem (CARDIZEM) 90 mg tablet Take 1 tablet (90 mg total) by mouth 2 (two) times a day 60 tablet 1    Eliquis 5 MG TAKE 1 TABLET BY MOUTH TWICE A DAY 60 tablet 12    glimepiride (AMARYL) 2 mg tablet Take 1 tablet (2 mg total) by mouth 2 (two) times a day 60 tablet 1    glucose blood (Accu-Chek SmartView) test strip Use as instructed  each 1    Januvia 100 MG tablet Take 1 tablet (100 mg total) by mouth daily 132 tablet 3    lisinopril (ZESTRIL) 40 mg tablet Take 1 tablet (40 mg total) by mouth daily 90 tablet 3    metFORMIN (GLUCOPHAGE) 500 mg tablet Take 1 tablet (500 mg total) by mouth daily with breakfast 90 tablet 6    metoprolol succinate (TOPROL-XL) 25 mg 24 hr tablet Take (3) Tablets daily by mouth PO BID 60 tablet 12    Sodium Chloride Flush (NORMAL SALINE FLUSH IV) Infuse 10 mL into a venous catheter 4 (four) times a day      docusate sodium (COLACE) 100 mg capsule Take 1 capsule (100 mg total) by mouth 2 (two) times a day (Patient not taking: Reported on 6/3/2021) 10 capsule 0    melatonin 3 mg Take 1 tablet (3 mg total) by mouth daily at bedtime (Patient not taking: Reported on 6/9/2021)  0    polyethylene glycol (MIRALAX) 17 g packet Take 17 g by mouth daily (Patient not taking: Reported on 6/9/2021)  0     No current facility-administered medications for this visit  Allergies   Allergen Reactions    Simvastatin Myalgia    Itraconazole Other (See Comments)     Pt denies knowledge of allergy  No Bhardwaj Immunizations:     Immunization History   Administered Date(s) Administered    INFLUENZA 12/04/2014, 09/30/2016, 09/30/2016, 11/19/2018    Influenza Split 01/14/2011, 12/20/2011, 11/26/2013    Influenza, high dose seasonal 0 7 mL 09/05/2018, 09/03/2019, 12/23/2020    Influenza, seasonal, injectable 09/11/2009, 12/04/2014    Pneumococcal Conjugate 13-Valent 10/24/2017, 11/17/2017    Pneumococcal Polysaccharide PPV23 03/06/2007, 01/04/2019    SARS-CoV-2 / COVID-19 mRNA IM (Pfizer-BioNTech) 05/06/2021, 05/27/2021    Zoster 09/18/2015      Health Maintenance:         Topic Date Due    Colorectal Cancer Screening  12/07/2021    Hepatitis C Screening  Completed         Topic Date Due    Meningococcal ACWY Vaccine (1 - Risk start before 7 months 4-dose series) Never done    Hepatitis A Vaccine (1 of 2 - Risk 2-dose series) Never done    Hepatitis B Vaccine (1 of 3 - Risk 3-dose series) Never done    DTaP,Tdap,and Td Vaccines (1 - Tdap) Never done      Medicare Health Risk Assessment:     Pulse 75   Temp 98 1 °F (36 7 °C) (Temporal)   SpO2 100%      Ar Kadi is here for his Subsequent Wellness visit  Health Risk Assessment:   Patient rates overall health as good  Patient feels that their physical health rating is same  Patient is satisfied with their life  Eyesight was rated as same  Hearing was rated as slightly worse  Patient feels that their emotional and mental health rating is same  Patients states they are never, rarely angry  Patient states they are never, rarely unusually tired/fatigued  Pain experienced in the last 7 days has been none  Patient states that he has experienced no weight loss or gain in last 6 months  Depression Screening:   PHQ-2 Score: 0      Fall Risk Screening: In the past year, patient has experienced: no history of falling in past year      Home Safety:  Patient has trouble with stairs inside or outside of their home   Patient has working smoke alarms and has working carbon monoxide detector  Home safety hazards include: none  Nutrition:   Current diet is Regular  Medications:   Patient is currently taking over-the-counter supplements  OTC medications include: see medication list  Patient is able to manage medications  Activities of Daily Living (ADLs)/Instrumental Activities of Daily Living (IADLs):   Walk and transfer into and out of bed and chair?: Yes  Dress and groom yourself?: Yes    Bathe or shower yourself?: Yes    Feed yourself? Yes  Do your laundry/housekeeping?: No  Manage your money, pay your bills and track your expenses?: Yes  Make your own meals?: No    Do your own shopping?: No    ADL comments: Toes amputation, family helps    Previous Hospitalizations:   Any hospitalizations or ED visits within the last 12 months?: Yes    How many hospitalizations have you had in the last year?: 3-4    Advance Care Planning:   Living will: No      PREVENTIVE SCREENINGS      Cardiovascular Screening:    General: Screening Not Indicated and History Lipid Disorder      Diabetes Screening:     General: Screening Not Indicated and History Diabetes      Colorectal Cancer Screening:     General: Screening Current      Abdominal Aortic Aneurysm (AAA) Screening:    Risk factors include: age between 73-67 yo and tobacco use        Lung Cancer Screening:     General: Screening Not Indicated      Hepatitis C Screening:    General: Screening Current    Screening, Brief Intervention, and Referral to Treatment (SBIRT)    Screening  Typical number of drinks in a day: 0    Single Item Drug Screening:  How often have you used an illegal drug (including marijuana) or a prescription medication for non-medical reasons in the past year? never    Single Item Drug Screen Score: 0  Interpretation: Negative screen for possible drug use disorder  Patient's shoes and socks removed  Right Foot/Ankle   Right Foot Inspection  Skin Exam:                        Amputation: amputation right foot Left Foot/Ankle  Left Foot Inspection  Skin Exam: skin normal and skin intactno dry skin, no warmth, no erythema, no maceration, normal color, no pre-ulcer, no ulcer and no callus                         Toe Exam: ROM and strength within normal limits                   Sensory       Monofilament: absent          Sandro Johnson MD

## 2021-06-23 NOTE — PROGRESS NOTES
Assessment/Plan:    No problem-specific Assessment & Plan notes found for this encounter  Diagnoses and all orders for this visit:    Acute osteomyelitis of right ankle or foot ([facility] )    Atrial fibrillation with RVR ([facility] [address] )    Essential hypertension    Type 2 diabetes mellitus with diabetic neuropathy, without long-term current use of insulin (HCC)    S/P R ALT flap to right foot    Hyperlipidemia, unspecified hyperlipidemia type    S/P transmetatarsal amputation of foot, right (HCC)    Benign prostatic hyperplasia with weak urinary stream    Type 2 diabetes mellitus without complication, unspecified whether long term insulin use (HCC)          Subjective:      Patient ID: Niraj Romero  is a 73 y o  male  PATIENT RETURNS FOR FOLLOW-UP OF CHRONIC MEDICAL CONDITIONS  NO HOSPITAL STAYS OR EMERGENCY VISITS RECENTLY  MEDS WERE REVIEWED AND NO SIDE EFFECTS  NO NEW ISSUES  UNLESS NOTED BELOW  NO NEW MEDICAL PROVIDER REPORTED  THE CHRONIC DISEASES LISTED ABOVE ARE STABLE AND UNCHANGED/ THE PLAN OF CARE FOR THOSE WILL REMAIN UNCHANGED UNLESS NOTED BELOW  S/p foot surgery     Sugars good     AWV/sub       The following portions of the patient's history were reviewed and updated as appropriate: allergies, current medications, past family history, past medical history, past social history, past surgical history and problem list     Review of Systems   Constitutional: Negative for activity change and appetite change  HENT: Negative for trouble swallowing  Eyes: Negative for visual disturbance  Respiratory: Negative for cough and shortness of breath  Cardiovascular: Negative for chest pain, palpitations and leg swelling  Gastrointestinal: Negative for abdominal pain and blood in stool  Endocrine: Negative for polyuria  Genitourinary: Negative for difficulty urinating and hematuria  Skin: Negative for rash  Neurological: Negative for dizziness     Psychiatric/Behavioral: Negative for behavioral problems  Objective:  Vitals:    06/23/21 0915   BP: 156/82   BP Location: Left arm   Patient Position: Sitting   Cuff Size: Large   Pulse: 75   Temp: 98 1 °F (36 7 °C)   TempSrc: Temporal   SpO2: 100%      Physical Exam  Constitutional:       Appearance: He is well-developed  HENT:      Head: Normocephalic and atraumatic  Eyes:      Conjunctiva/sclera: Conjunctivae normal    Neck:      Thyroid: No thyromegaly  Cardiovascular:      Rate and Rhythm: Normal rate and regular rhythm  Heart sounds: Normal heart sounds  No murmur heard  Pulmonary:      Effort: Pulmonary effort is normal  No respiratory distress  Breath sounds: Normal breath sounds  Musculoskeletal:      Cervical back: Neck supple  Lymphadenopathy:      Cervical: No cervical adenopathy  Skin:     General: Skin is warm and dry  Psychiatric:         Behavior: Behavior normal          Patient's chronic problems that were reviewed today are stable  Recent hospital stays reviewed  Recent labs and imaging reviewed  Recent visits to other providers reviewed  Meds reviewed and no changes made  Appropriate labs and imaging were ordered  Preventive measures appropriate for age and sex were reviewed with patient  Immunizations were updated as appropriate  Recovering from recent Trans- met  Procedure         Assessment and Plan:     Problem List Items Addressed This Visit        Endocrine    Type 2 diabetes mellitus with diabetic neuropathy ([facility] [address] ) (Chronic)       Cardiovascular and Mediastinum    Essential hypertension (Chronic)    Atrial fibrillation with RVR (HCC)       Musculoskeletal and Integument    Acute osteomyelitis of right ankle or foot ([facility]) - Primary       Genitourinary    Benign prostatic hyperplasia with lower urinary tract symptoms (Chronic)       Other    Hyperlipidemia (Chronic)    S/P transmetatarsal amputation of foot, right (HCC)    S/P R ALT flap to right foot      Other Visit Diagnoses     Type 2 diabetes mellitus without complication, unspecified whether long term insulin use ([place] [address] )               Preventive health issues were discussed with patient, and age appropriate screening tests were ordered as noted in patient's After Visit Summary  Personalized health advice and appropriate referrals for health education or preventive services given if needed, as noted in patient's After Visit Summary       History of Present Illness:     Patient presents for Medicare Annual Wellness visit    Patient Care Team:  Alisia Marroquin MD as PCP - General (Family Medicine)     Problem List:     Patient Active Problem List   Diagnosis    Hyperlipidemia    Type 2 diabetes mellitus with diabetic neuropathy ([facility] )    Atrial fibrillation with RVR ([facility] )    Right middle lobe pulmonary nodule    Essential hypertension    Acquired absence of other right toe(s) ([place] [address] )    Benign prostatic hyperplasia with lower urinary tract symptoms    Positive for microalbuminuria    Deep disruption or dehiscence of operation wound    Post-operative state    Aftercare for amputation stump    Ambulatory dysfunction    S/P transmetatarsal amputation of foot, right ([facility] )    Sleep disturbance    [facility] or foot [facility]    Anxiety    S/P R ALT flap to right foot      Past Medical and Surgical History:     Past Medical History:   Diagnosis Date    Arthritis     Atrial fibrillation ([facility] )     Diagnosed 11/2018    Benign prostate hyperplasia 04/2002    Cellulitis     right lower leg     Colon polyp 2006    Diabetes mellitus ([facility] )     Hearing aid worn     bilat    Hearing loss     90% loss left ear and 40% right ear    History of clubfoot     "since birth"    History of pneumonia     History of TIA (transient ischemic attack) 04/25/2017 11/30/18 pt denies    Hyperlipidemia 5/15/2014    Hypertension     Infectious viral hepatitis     "cant remember type"    Irregular heart beat     Neuropathy     both feet    Osteomyelitis ([facility] [address] [address] )     right great toe    Right middle lobe pulmonary nodule 11/6/2018    Seasickness     Teeth missing     Type 2 diabetes mellitus with diabetic neuropathy ([facility] ) 11/6/2018    Wears glasses     reading    Wound, open     bottom of right foot     Past Surgical History:   Procedure Laterality Date    BUNIONECTOMY Right 12/4/2018    Procedure: 5TH METATARSAL BONE PARTIAL RESECTION, FULL THICKNESS DEBRIDEMENT OF DIABETIC ULCER;  Surgeon: Patricia Garnica DPM;  [facility];  Service: Podiatry    CLUB FOOT RELEASE Bilateral     COLONOSCOPY      COMPLEX WOUND CLOSURE TO EXTREMITY  4/27/2021    Procedure: COMPLEX WOUND CLOSURE TO EXTREMITY: RIGHT FOOT;  Surgeon: Ya Bernstein MD;  [facility];  Service: Plastics    EXTERNAL FIXATOR APPLICATION Right 1/74/9095    Procedure: Application multiplane external fixation;  Surgeon: Mary Kay Fletcher DPM;  [facility];  Service: Podiatry    FOOT HARDWARE REMOVAL Right 2/17/2021    Procedure: REMOVAL EXTERNAL FIXATOR;  Surgeon: Mary Kay Fletcher DPM;  [facility];  Service: Podiatry    FOREIGN BODY REMOVAL Right 4/27/2021    Procedure: REMOVAL FOREIGN BODY EXTREMITY: RIGHT FOOT;  Surgeon: Ya Bernstein MD;  [facility];  Service: Plastics    INCISION AND DRAINAGE OF WOUND Right 2/17/2021    Procedure: INCISION AND DRAINAGE (I&D) EXTREMITY;  Surgeon: Mary Kay Fletcher DPM;  [facility];  Service: Podiatry    ORIF FOOT FRACTURE Right 3/4/2021    Procedure: VAC PLACEMENT; SCREW FIXATION RIGHT FOOT FUSION;  Surgeon: Mary Kay Fletcher DPM;  [facility];  Service: Podiatry    [address] [place] [address] Right 2/22/2021    Procedure: AMPUTATION TRANSMETATARSAL (TMA), REMOVAL NAIL IM T2 ICF HARDWARE;  Surgeon: Mary Kay Fletcher DPM;  [facility];  Service: Podiatry    SC FUSION FOOT BONES,MIDTARSAL,OSTEOTMY Right 2/12/2021    Procedure: foot ARTHRODESIS/FUSION; Surgeon: Cj Ford DPM;  [facility];  Service: Podiatry    ME LENGTH/SHORT LEG/ANKL TENDON,SINGLE Right 2/12/2021    Procedure: LENGTHEN TIBIAL TENDON, TRANS HEEL CORD;  Surgeon: Cj Ford DPM;  [facility];  Service: Podiatry    ME MUSCLE-SKIN FLAP,TRUNK Right 4/12/2021    Procedure: RECONSTRUCTION MICROSURGICAL W/ FREE FLAP;  Surgeon: Davina Aleman MD;  [facility];  Service: Plastics    ME MUSCLE-SKIN FLAP,TRUNK Right 4/12/2021    Procedure: TAKEBACK RECONSTRUCTION MICROSURGICAL W/ FREE FLAP;  Surgeon: Davina Aleman MD;  [facility];  Service: Plastics    ME MUSCLE-SKIN FLAP,TRUNK Right 4/13/2021    Procedure: RECONSTRUCTION MICROSURGICAL W/ FREE FLAP, RE EXPLORATION, MICRO VASCULAR REVISION;  Surgeon: Davina Aleman MD;  [facility];  Service: Plastics    ME PART [address] [facility] BONE METATARSAL HEAD,EA Right 12/29/2020    Procedure: EXCISION EXOSTOSIS;  Surgeon: Cj Ford DPM;  [facility];  Service: [place] SURG WND EXTEN/COMPLIC Right 89/28/3446    Procedure: PRIMARY DELAYED CLOSURE;  Surgeon: Cj Ford DPM;  [facility];  Service: Podiatry    TOE AMPUTATION Right     partial great toe    TONSILLECTOMY      VAC DRESSING APPLICATION Right 3/5/6715    Procedure: APPLICATION VAC DRESSING EXTREMITY;  Surgeon: Davina Aleman MD;  [facility];  Service: Plastics    [facility] [address] [place] DEBRIDEMENT Right 3/1/2021    Procedure: DEBRIDEMENT LOWER EXTREMITY ([address] OUT); Surgeon: Davina Aleman MD;  [facility];  Service: Plastics    WOUND DEBRIDEMENT Right 4/7/2021    Procedure: DEBRIDEMENT RIGHT FOOT;  Surgeon: Davina Aleman MD;  [facility];  Service: Plastics    WOUND DEBRIDEMENT Right 4/12/2021    Procedure: DEBRIDEMENT LOWER EXTREMITY [facility] OUT);   Surgeon: Davina Aleman MD;  [facility]; Service: Plastics    WOUND DEBRIDEMENT Right 2021    Procedure: DEBRIDEMENT LOWER EXTREMITY [facility] OUT): RIGHT FOOT;  Surgeon: Jose Munoz MD;  [facility];  Service: Plastics      Family History:     Family History   Problem Relation Age of Onset    Diabetes Father         mellitus      Social History:     Social History     Socioeconomic History    Marital status: /Civil Union     Spouse name: None    Number of children: None    Years of education: None    Highest education level: None   Occupational History    None   Tobacco Use    Smoking status: Former Smoker     Types: Cigarettes     Quit date: 1988     Years since quittin 6    Smokeless tobacco: Former User    Tobacco comment: exposure to passive smoke   Vaping Use    Vaping Use: Never used   Substance and Sexual Activity    Alcohol use: Yes     Comment: socially    Drug use: Not Currently    Sexual activity: Yes     Partners: Female   Other Topics Concern    None   Social History Narrative    None     Social Determinants of Health     Financial Resource Strain:     Difficulty of Paying Living Expenses:    Food Insecurity:     Worried About Running Out of Food in the Last Year:     Ran Out of Food in the Last Year:    Transportation Needs:     Lack of Transportation (Medical):      Lack of Transportation (Non-Medical):    Physical Activity:     Days of Exercise per Week:     Minutes of Exercise per Session:    Stress:     Feeling of Stress :    Social Connections:     Frequency of Communication with Friends and Family:     Frequency of Social Gatherings with Friends and Family:     Attends Confucianism Services:     Active Member of Clubs or Organizations:     Attends Club or Organization Meetings:     Marital Status:    Intimate Partner Violence:     Fear of Current or Ex-Partner:     Emotionally Abused:     Physically Abused:     Sexually Abused:       Medications and Allergies: Current Outpatient Medications   Medication Sig Dispense Refill    acetaminophen (Mapap Arthritis Pain) 650 mg CR tablet Take 1 tablet (650 mg total) by mouth 2 (two) times a day 60 tablet 1    aspirin 325 mg tablet Take 1 tablet (325 mg total) by mouth daily  0    atorvastatin (LIPITOR) 40 mg tablet Take 1 tablet (40 mg total) by mouth daily 90 tablet 3    Blood Glucose Monitoring Suppl (ACCU-CHEK JYOTI SMARTVIEW) w/Device KIT use to check Blood Sugar as needed      diltiazem (CARDIZEM) 90 mg tablet Take 1 tablet (90 mg total) by mouth 2 (two) times a day 60 tablet 1    Eliquis 5 MG TAKE 1 TABLET BY MOUTH TWICE A DAY 60 tablet 12    glimepiride (AMARYL) 2 mg tablet Take 1 tablet (2 mg total) by mouth 2 (two) times a day 60 tablet 1    glucose blood (Accu-Chek SmartView) test strip Use as instructed  each 1    Januvia 100 MG tablet Take 1 tablet (100 mg total) by mouth daily 132 tablet 3    lisinopril (ZESTRIL) 40 mg tablet Take 1 tablet (40 mg total) by mouth daily 90 tablet 3    metFORMIN (GLUCOPHAGE) 500 mg tablet Take 1 tablet (500 mg total) by mouth daily with breakfast 90 tablet 6    metoprolol succinate (TOPROL-XL) 25 mg 24 hr tablet Take (3) Tablets daily by mouth PO BID 60 tablet 12    Sodium Chloride Flush (NORMAL SALINE FLUSH IV) Infuse 10 mL into a venous catheter 4 (four) times a day      docusate sodium (COLACE) 100 mg capsule Take 1 capsule (100 mg total) by mouth 2 (two) times a day (Patient not taking: Reported on 6/3/2021) 10 capsule 0    melatonin 3 mg Take 1 tablet (3 mg total) by mouth daily at bedtime (Patient not taking: Reported on 6/9/2021)  0    polyethylene glycol (MIRALAX) 17 g packet Take 17 g by mouth daily (Patient not taking: Reported on 6/9/2021)  0     No current facility-administered medications for this visit  Allergies   Allergen Reactions    Simvastatin Myalgia    Itraconazole Other (See Comments)     Pt denies knowledge of allergy  [place]arc[place] Immunizations:     Immunization History   Administered Date(s) Administered    INFLUENZA 12/04/2014, 09/30/2016, 09/30/2016, 11/19/2018    Influenza Split 01/14/2011, 12/20/2011, 11/26/2013    Influenza, high dose seasonal 0 7 mL 09/05/2018, 09/03/2019, 12/23/2020    Influenza, seasonal, injectable 09/11/2009, 12/04/2014    Pneumococcal Conjugate 13-Valent 10/24/2017, 11/17/2017    Pneumococcal Polysaccharide PPV23 03/06/2007, 01/04/2019    SARS-CoV-2 / COVID-19 mRNA IM (Pfizer-BioNTech) 05/06/2021, 05/27/2021    Zoster 09/18/2015      Health Maintenance:         Topic Date Due    Colorectal Cancer Screening  12/07/2021    Hepatitis C Screening  Completed         Topic Date Due    Meningococcal ACWY Vaccine (1 - Risk start before 7 months 4-dose series) Never done    Hepatitis A Vaccine (1 of 2 - Risk 2-dose series) Never done    Hepatitis B Vaccine (1 of 3 - Risk 3-dose series) Never done    DTaP,Tdap,and Td Vaccines (1 - Tdap) Never done      Medicare Health Risk Assessment:     /82 (BP Location: Left arm, Patient Position: Sitting, Cuff Size: Large)   Pulse 75   Temp 98 1 °F (36 7 °C) (Temporal)   SpO2 100%      Mega Felix is here for his Subsequent Wellness visit  Last Medicare Wellness visit information reviewed, patient interviewed, no change since last AWV       Depression Screening:   PHQ-2 Score: 0      Previous Hospitalizations:   Any hospitalizations or ED visits within the last 12 months?: Yes      Hospitalization Comments: As above     Advance Care Planning:   Living will: No    Durable POA for healthcare: No    Advanced directive: No      Comments: Papers given     Cognitive Screening:   Provider or family/friend/caregiver concerned regarding cognition?: No    PREVENTIVE SCREENINGS      Cardiovascular Screening:    General: Screening Not Indicated, History Lipid Disorder and Screening Current      Diabetes Screening:     General: Screening Not Indicated, History Diabetes and Screening Current      Colorectal Cancer Screening:     General: Screening Current      Prostate Cancer Screening:    General: Screening Current      Osteoporosis Screening:    General: Screening Not Indicated      Abdominal Aortic Aneurysm (AAA) Screening:    Risk factors include: age between 73-69 yo and tobacco use        Lung Cancer Screening:     General: Screening Not Indicated      Hepatitis C Screening:    General: Screening Current    Screening, Brief Intervention, and Referral to Treatment (SBIRT)      Brief Intervention  Alcohol & drug use screenings were reviewed  No concerns regarding substance use disorder identified          Tessa Pfeiffer MD

## 2021-06-23 NOTE — PATIENT INSTRUCTIONS

## 2021-06-28 DIAGNOSIS — I48.91 ATRIAL FIBRILLATION WITH RVR (HCC): ICD-10-CM

## 2021-06-28 RX ORDER — DILTIAZEM HCL 90 MG
90 TABLET ORAL 2 TIMES DAILY
Qty: 60 TABLET | Refills: 0 | Status: SHIPPED | OUTPATIENT
Start: 2021-06-28 | End: 2021-08-27

## 2021-06-30 ENCOUNTER — TELEPHONE (OUTPATIENT)
Dept: WOUND CARE | Facility: HOSPITAL | Age: 71
End: 2021-06-30

## 2021-06-30 NOTE — TELEPHONE ENCOUNTER
Call received from Noreen Epperson, [facility] with [facility] at [phone]  Reports the top portion of right foot with +1 edema, and noted with wound there is green/tan drainage with slough present, denies any odors, afebrile and no redness to the foot  Discussed with Noreen Epperson to dixon text a picture of the wound to myself and will forward to Dr Shashi Estrella  Patient does have next appointment at the [facility] on 7/2/21  Tiger text received back from Dr Shashi Estrella, ok with wound and to continue with wound vac at this time  Call placed back to Noreen Epperson to make aware of same  Verbalized her understanding of same

## 2021-07-02 ENCOUNTER — OFFICE VISIT (OUTPATIENT)
Dept: WOUND CARE | Facility: HOSPITAL | Age: 71
End: 2021-07-02
Payer: COMMERCIAL

## 2021-07-02 VITALS
DIASTOLIC BLOOD PRESSURE: 90 MMHG | SYSTOLIC BLOOD PRESSURE: 170 MMHG | RESPIRATION RATE: 16 BRPM | TEMPERATURE: 97.7 F | HEART RATE: 86 BPM

## 2021-07-02 DIAGNOSIS — L97.412 DIABETIC ULCER OF RIGHT MIDFOOT ASSOCIATED WITH TYPE 2 DIABETES MELLITUS, WITH FAT LAYER EXPOSED (HCC): Primary | ICD-10-CM

## 2021-07-02 DIAGNOSIS — E11.621 DIABETIC ULCER OF RIGHT MIDFOOT ASSOCIATED WITH TYPE 2 DIABETES MELLITUS, WITH FAT LAYER EXPOSED (HCC): Primary | ICD-10-CM

## 2021-07-02 PROCEDURE — 11044 DBRDMT BONE 1ST 20 SQ CM/<: CPT | Performed by: PODIATRIST

## 2021-07-02 RX ORDER — LIDOCAINE HYDROCHLORIDE 40 MG/ML
5 SOLUTION TOPICAL ONCE
Status: COMPLETED | OUTPATIENT
Start: 2021-07-02 | End: 2021-07-02

## 2021-07-02 RX ADMIN — LIDOCAINE HYDROCHLORIDE 5 ML: 40 SOLUTION TOPICAL at 08:54

## 2021-07-02 NOTE — PROGRESS NOTES
Patient ID: Jessica Brownlee  is a 73 y o  male Date of Birth 1950     My role is Foot, Ankle and Wound Specialist    Chief Complaint   Patient presents with    Follow Up Wound Care Visit     right foot wound        Diabetic foot wound    Subjective:   Vincent Jasso is now home  He's mostly NWB  He had his free flap and hardware removal  He now has VAC to the hardware removal site  Sutures have been removed         The following portions of the patient's history were reviewed and updated as appropriate:   Patient Active Problem List   Diagnosis    Hyperlipidemia    Type 2 diabetes mellitus with diabetic neuropathy ([address] )    Atrial fibrillation with RVR ([address] )    Right middle lobe pulmonary nodule    Essential hypertension    Acquired absence of other right toe(s) ([address] )    Benign prostatic hyperplasia with lower urinary tract symptoms    Positive for microalbuminuria    Deep disruption or dehiscence of operation wound    Post-operative state    Aftercare for amputation stump    Ambulatory dysfunction    S/P transmetatarsal amputation of foot, right (HCC)    Sleep disturbance    Acute osteomyelitis of right ankle or foot ([address] )    Anxiety    S/P R ALT flap to right foot     Past Medical History:   Diagnosis Date    Arthritis     Atrial fibrillation ([address] )     Diagnosed 11/2018    Benign prostate hyperplasia 04/2002    Cellulitis     right lower leg     Colon polyp 2006    Diabetes mellitus ([address] )     Hearing aid worn     bilat    Hearing loss     90% loss left ear and 40% right ear    History of clubfoot     "since birth"    History of pneumonia     History of TIA (transient ischemic attack) 04/25/2017 11/30/18 pt denies    Hyperlipidemia 5/15/2014    Hypertension     Infectious viral hepatitis     "cant remember type"    Irregular heart beat     Neuropathy     both feet    Osteomyelitis ([address] )     right great toe    Right middle lobe pulmonary nodule 11/6/2018    Seasickness     Teeth missing     Type 2 diabetes mellitus with diabetic neuropathy ([facility] ) 11/6/2018    Wears glasses     reading    Wound, open     bottom of right foot     Past Surgical History:   Procedure Laterality Date    BUNIONECTOMY Right 12/4/2018    Procedure: 5TH METATARSAL BONE PARTIAL RESECTION, FULL THICKNESS DEBRIDEMENT OF DIABETIC ULCER;  Surgeon: Hermelinda Solis DPM;  [facility];  Service: Podiatry    CLUB FOOT RELEASE Bilateral     COLONOSCOPY      COMPLEX WOUND CLOSURE TO EXTREMITY  4/27/2021    Procedure: COMPLEX WOUND CLOSURE TO EXTREMITY: RIGHT FOOT;  Surgeon: Rolf Jo MD;  [facility];  Service: Plastics    EXTERNAL FIXATOR APPLICATION Right 5/39/0634    Procedure: Application multiplane external fixation;  Surgeon: Traci Dickerson DPM;  [facility];  Service: Podiatry    FOOT HARDWARE REMOVAL Right 2/17/2021    Procedure: REMOVAL EXTERNAL FIXATOR;  Surgeon: Traci Dickerson DPM;  [facility];  Service: Podiatry    FOREIGN BODY REMOVAL Right 4/27/2021    Procedure: REMOVAL FOREIGN BODY EXTREMITY: RIGHT FOOT;  Surgeon: Rolf Jo MD;  [facility];  Service: Plastics    INCISION AND DRAINAGE OF WOUND Right 2/17/2021    Procedure: INCISION AND DRAINAGE (I&D) EXTREMITY;  Surgeon: Traci Dickerson DPM;  [facility];  Service: Podiatry    ORIF FOOT FRACTURE Right 3/4/2021    Procedure: VAC PLACEMENT; SCREW FIXATION RIGHT FOOT FUSION;  Surgeon: Traci Dickerson DPM;  [facility];  Service: Podiatry    [address] [place] [address] Right 2/22/2021    Procedure: AMPUTATION TRANSMETATARSAL (TMA), REMOVAL NAIL IM T2 ICF HARDWARE;  Surgeon: Traci Dickerson DPM;  [facility];  Service: Podiatry    WA FUSION FOOT BONES,MIDTARSAL,OSTEOTMY Right 2/12/2021    Procedure: foot ARTHRODESIS/FUSION;  Surgeon: Traci Dickerson DPM;  [facility];  Service: Podiatry    WA LENGTH/SHORT LEG/ANKL TENDON,SINGLE Right 2/12/2021    Procedure: LENGTHEN TIBIAL TENDON, TRANS HEEL CORD;  Surgeon: Efrain Hill DPM;  [facility];  Service: [address] Right 4/12/2021    Procedure: RECONSTRUCTION MICROSURGICAL W/ FREE FLAP;  Surgeon: Savannah Brownlee MD;  [facility];  Service: Plastics    MO MUSCLE-SKIN FLAP,TRUNK Right 4/12/2021    Procedure: TAKEBACK RECONSTRUCTION MICROSURGICAL W/ FREE FLAP;  Surgeon: Savannah Brownlee MD;  [facility];  Service: Plastics    MO MUSCLE-SKIN FLAP,TRUNK Right 4/13/2021    Procedure: RECONSTRUCTION MICROSURGICAL W/ FREE FLAP, RE EXPLORATION, MICRO VASCULAR REVISION;  Surgeon: Savannah Brownlee MD;  [facility];  Service: Plastics    MO PART [address] BONE METATARSAL HEAD,EA Right 12/29/2020    Procedure: EXCISION EXOSTOSIS;  Surgeon: Efrain Hill DPM;  [facility];  Service: Johnson Guru SURG WND EXTEN/COMPLIC Right 98/92/6300    Procedure: PRIMARY DELAYED CLOSURE;  Surgeon: Efrain Hill DPM;  [facility];  Service: Podiatry    TOE AMPUTATION Right     partial great toe    TONSILLECTOMY      VAC DRESSING APPLICATION Right 4/6/5622    Procedure: APPLICATION VAC DRESSING EXTREMITY;  Surgeon: Savannah Brownlee MD;  [facility];  Service: Plastics    [address] Right 3/1/2021    Procedure: DEBRIDEMENT LOWER EXTREMITY ([address]); Surgeon: Savannah Brownlee MD;  [facility];  Service: Plastics    WOUND DEBRIDEMENT Right 4/7/2021    Procedure: DEBRIDEMENT RIGHT FOOT;  Surgeon: Savannah Brownlee MD;  [facility];  Service: Plastics    WOUND DEBRIDEMENT Right 4/12/2021    Procedure: DEBRIDEMENT LOWER EXTREMITY [facility] OUT);   Surgeon: Savannah Brownlee MD;  [facility];  Service: Plastics    WOUND DEBRIDEMENT Right 4/27/2021    Procedure: DEBRIDEMENT LOWER EXTREMITY ([address]): RIGHT FOOT; Surgeon: Sonu Auguste MD;  [facility];  Service: Plastics     Social History     Socioeconomic History    Marital status: /Civil Union     Spouse name: None    Number of children: None    Years of education: None    Highest education level: None   Occupational History    None   Tobacco Use    Smoking status: Former Smoker     Types: Cigarettes     Quit date: 1988     Years since quittin 6    Smokeless tobacco: Former User    Tobacco comment: exposure to passive smoke   Vaping Use    Vaping Use: Never used   Substance and Sexual Activity    Alcohol use: Yes     Comment: socially    Drug use: Not Currently    Sexual activity: Yes     Partners: Female   Other Topics Concern    None   Social History Narrative    None     Social Determinants of Health     Financial Resource Strain:     Difficulty of Paying Living Expenses:    Food Insecurity:     Worried About Running Out of Food in the Last Year:     [address] in the Last Year:    Transportation Needs:     Lack of Transportation (Medical):      Lack of Transportation (Non-Medical):    Physical Activity:     Days of Exercise per Week:     Minutes of Exercise per Session:    Stress:     Feeling of Stress :    Social Connections:     Frequency of Communication with Friends and Family:     Frequency of Social Gatherings with Friends and Family:     Attends Judaism Services:     Active Member of Clubs or Organizations:     Attends Club or Organization Meetings:     Marital Status:    Intimate Partner Violence:     Fear of Current or Ex-Partner:     Emotionally Abused:     Physically Abused:     Sexually Abused:         Current Outpatient Medications:     acetaminophen (Mapap Arthritis Pain) 650 mg CR tablet, Take 1 tablet (650 mg total) by mouth 2 (two) times a day, Disp: 60 tablet, Rfl: 1    aspirin 325 mg tablet, Take 1 tablet (325 mg total) by mouth daily, Disp:  , Rfl: 0    atorvastatin (LIPITOR) 40 mg tablet, Take 1 tablet (40 mg total) by mouth daily, Disp: 90 tablet, Rfl: 3    Blood Glucose Monitoring Suppl (ACCU-CHEK JYOTI SMARTVIEW) w/Device KIT, use to check Blood Sugar as needed, Disp: , Rfl:     diltiazem (CARDIZEM) 90 mg tablet, Take 1 tablet (90 mg total) by mouth 2 (two) times a day, Disp: 60 tablet, Rfl: 0    docusate sodium (COLACE) 100 mg capsule, Take 1 capsule (100 mg total) by mouth 2 (two) times a day, Disp: 10 capsule, Rfl: 0    Eliquis 5 MG, TAKE 1 TABLET BY MOUTH TWICE A DAY, Disp: 60 tablet, Rfl: 12    glimepiride (AMARYL) 2 mg tablet, Take 1 tablet (2 mg total) by mouth 2 (two) times a day, Disp: 60 tablet, Rfl: 1    glucose blood (Accu-Chek SmartView) test strip, Use as instructed BID, Disp: 200 each, Rfl: 6    Januvia 100 MG tablet, Take 1 tablet (100 mg total) by mouth daily, Disp: 132 tablet, Rfl: 3    lisinopril (ZESTRIL) 40 mg tablet, Take 1 tablet (40 mg total) by mouth daily, Disp: 90 tablet, Rfl: 3    melatonin 3 mg, Take 1 tablet (3 mg total) by mouth daily at bedtime, Disp: , Rfl: 0    metFORMIN (GLUCOPHAGE) 500 mg tablet, Take 1 tablet (500 mg total) by mouth 2 (two) times a day with meals, Disp: 180 tablet, Rfl: 6    metoprolol succinate (TOPROL-XL) 25 mg 24 hr tablet, Take (3) Tablets daily by mouth PO BID, Disp: 60 tablet, Rfl: 12    polyethylene glycol (MIRALAX) 17 g packet, Take 17 g by mouth daily, Disp:  , Rfl: 0    Sodium Chloride Flush (NORMAL SALINE FLUSH IV), Infuse 10 mL into a venous catheter 4 (four) times a day, Disp: , Rfl:   No current facility-administered medications for this visit  Family History   Problem Relation Age of Onset    Diabetes Father         mellitus      Review of Systems   Constitutional: Positive for activity change  Negative for appetite change, chills, diaphoresis, fatigue, fever and unexpected weight change  Skin: Positive for wound         Allergies  Simvastatin and Itraconazole    Objective:  /90 Pulse 86   Temp 97 7 °F (36 5 °C)   Resp 16     Physical Exam  Vitals and nursing note reviewed  Constitutional:       General: He is not in acute distress  Appearance: Normal appearance  He is not ill-appearing, toxic-appearing or diaphoretic  Cardiovascular:      Pulses: Normal pulses  Heart sounds: Normal heart sounds  Pulmonary:      Effort: Pulmonary effort is normal       Breath sounds: Normal breath sounds  Musculoskeletal:      Comments: TMA Right foot   Skin:     Comments: See Wound Assessment  Neurological:      Mental Status: He is alert  Wound 06/04/21 Diabetic Ulcer Foot Anterior;Right (Active)   Wound Image   07/02/21 0910   Wound Description Pink;Probes to bone;Yellow;Epithelialization;Granulation tissue;Slough 07/02/21 0847   [place]-wound Assessment Scar Tissue;Dry; Intact; Maceration 07/02/21 0847   Wound Length (cm) 0 7 cm 07/02/21 0847   Wound Width (cm) 20 cm 07/02/21 0847   Wound Depth (cm) 1 cm 07/02/21 0847   Wound Surface Area (cm^2) 14 cm^2 07/02/21 0847   Wound Volume (cm^3) 14 cm^3 07/02/21 0847   Calculated Wound Volume (cm^3) 14 cm^3 07/02/21 0847   Change in Wound Size % 55 13 07/02/21 0847   Drainage Amount Moderate 07/02/21 0847   Drainage Description Serosanguineous; Yellow 07/02/21 0847   Non-staged Wound Description Full thickness 07/02/21 0847   Dressing Wound V A C  07/02/21 0847           Wound 06/04/21 Diabetic Ulcer Foot Anterior;Right (Active)   Wound Image   07/02/21 0910   Wound Description Pink;Probes to bone;Yellow;Epithelialization;Granulation tissue;Slough 07/02/21 0847   Lavern-wound Assessment Scar Tissue;Dry; Intact; Maceration 07/02/21 0847   Wound Length (cm) 0 7 cm 07/02/21 0847   Wound Width (cm) 20 cm 07/02/21 0847   Wound Depth (cm) 1 cm 07/02/21 0847   Wound Surface Area (cm^2) 14 cm^2 07/02/21 0847   Wound Volume (cm^3) 14 cm^3 07/02/21 0847   Calculated Wound Volume (cm^3) 14 cm^3 07/02/21 0847   Change in Wound Size % 55 13 07/02/21 2716   Drainage Amount Moderate 07/02/21 0847   Drainage Description Serosanguineous; Yellow 07/02/21 0847   Non-staged Wound Description Full thickness 07/02/21 0847   Dressing Wound V A C  07/02/21 0847                         Diagnosis:  1  Diabetic ulcer of right 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, with fat layer exposed ([address] )  -     lidocaine (XYLOCAINE) 4 % topical solution 5 mL  -     Wound negative pressure wound therapy; Future        Diagnosis ICD-10-CM Associated Orders   1  Diabetic ulcer of right 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, with fat layer exposed ([address] )  E11 621 lidocaine (XYLOCAINE) 4 % topical solution 5 mL    L97 [id number] Wound negative pressure wound therapy        ASSESSMENT    Problems:    Chronic illness / Problem not at goal with exacerbation, progression or side effects of treatment  1  Diabetic foot wound        PLAN  No results found     Discussion of Management /  Recommendations  I note some sharp bone shards in the base of the wound  I think this is going to preclude granulation tissue formation  I used curette and rongeur to remove the bony debris and debrided it down to healthy viable bleeding bone  I recommend continue NPWT with VAC  Debridement   Wound 06/04/21 Diabetic Ulcer Foot Anterior;Right    Universal Protocol:  Consent: Verbal consent obtained  Risks and benefits: risks, benefits and alternatives were discussed  Consent given by: patient  Time out: Immediately prior to procedure a "time out" was called to verify the correct patient, procedure, equipment, support staff and site/side marked as required    Timeout called at: 7/2/2021 9:36 AM   Patient understanding: patient states understanding of the procedure being performed  Required items: required blood products, implants, devices, and special equipment available  Patient identity confirmed: verbally with patient      Performed by: physician  Debridement type: surgical  Level of debridement: bone  Pain control: lidocaine 4%  Pre-debridement measurements  Length (cm): 0 7  Width (cm): 20  Depth (cm): 1  Surface Area (cm^2): 14  Volume (cm^3): 14    Post-debridement measurements  Length (cm): 0 7  Width (cm): 20  Depth (cm): 1  Percent debrided: 5%  Surface Area (cm^2): 14  Area debrided (cm^2): 0 7  Volume (cm^3): 14  Tissue and other material debrided: bone  Devitalized tissue debrided: necrotic debris  Instrument(s) utilized: curette and rongeur  Bleeding: small  Hemostasis obtained with: pressure  Procedural pain (0-10): insensate  Post-procedural pain: insensate   Response to treatment: procedure was tolerated well

## 2021-07-02 NOTE — PATIENT INSTRUCTIONS
Orders Placed This Encounter   Procedures    Wound negative pressure wound therapy     Right medial foot open area  Apply Wound VAC to MEDIAL open are ONLY  Negative Pressure Wound Therapy Instructions Apply black granufoam to wound bed, DO NOT bridge  Set negative pressure on continuous at 125 mmHG, VAC dressing to be changed three times per week as ordered  Other: Drape margins 1-2 inches on MEDIAL aspect of foot ONLY (Dr Krystian Lewis does not want VAC film uses in excess  It must be limited to medial open periwound ONLY)  Use stomahesive strip paste to assist with seal on suture line  Right lateral and plantar foot open area: Wash your hands with soap and water  Remove old dressing, discard into plastic bag and place in trash  Cleanse the wound with normal saline solution prior to applying a clean dressing  Do not use tissue or cotton balls  Do not scrub the wound  Pat dry using gauze  Shower no   Apply silver alginate to open areas  Cover with gauze 4x4 (NO VAC TO THIS AREA) Secure with ava and tape Change dressing 3 times per week  Off-loading Instructions: Wear off-loading device as directed by your physician  Put on immediately when rising in the morning and remove when going to bed  Wedge shoe       Standing Status:   Future     Standing Expiration Date:   7/2/2022

## 2021-07-07 ENCOUNTER — TELEPHONE (OUTPATIENT)
Dept: FAMILY MEDICINE CLINIC | Facility: CLINIC | Age: 71
End: 2021-07-07

## 2021-07-07 NOTE — TELEPHONE ENCOUNTER
Eve Barnes from [facility], calling to let Dr Yessica Ch know they will continue to see him 3x a week for 9 weeks due to wound care on right foot

## 2021-07-14 ENCOUNTER — OFFICE VISIT (OUTPATIENT)
Dept: PLASTIC SURGERY | Facility: CLINIC | Age: 71
End: 2021-07-14

## 2021-07-14 VITALS — TEMPERATURE: 97.1 F | HEIGHT: 76 IN | BODY MASS INDEX: 28.36 KG/M2

## 2021-07-14 DIAGNOSIS — Z89.431 S/P TRANSMETATARSAL AMPUTATION OF FOOT, RIGHT (HCC): Primary | ICD-10-CM

## 2021-07-14 DIAGNOSIS — Z98.890 S/P FLAP GRAFT: ICD-10-CM

## 2021-07-14 PROCEDURE — 99024 POSTOP FOLLOW-UP VISIT: CPT | Performed by: PHYSICIAN ASSISTANT

## 2021-07-14 NOTE — PROGRESS NOTES
Brittnee Etelvina is 12 weeks post-op: s/p 4/12/21 DEBRIDEMENT LOWER EXTREMITY ([facility] OUT) (Right Foot)       RECONSTRUCTION MICROSURGICAL W/ FREE FLAP (Right Foot)   Presenting for routine follow-up  S:  Doing well  Patient has noted no excessive redness, swelling, pain and discharge  O:  Wound healing  Drainage as expected  A:  Medial wound opening 2 cm; 1 4 cm deep  [place] P: VAC changed  Patient to return in 1 month  Patient is to return as needed for redness, swelling, discomfort, or any concern about his surgery

## 2021-07-22 ENCOUNTER — HOSPITAL ENCOUNTER (OUTPATIENT)
Dept: NON INVASIVE DIAGNOSTICS | Facility: CLINIC | Age: 71
Discharge: HOME | End: 2021-07-22
Payer: COMMERCIAL

## 2021-07-22 DIAGNOSIS — R07.9 CHEST PAIN, UNSPECIFIED TYPE: ICD-10-CM

## 2021-07-22 DIAGNOSIS — I10 ESSENTIAL HYPERTENSION: Chronic | ICD-10-CM

## 2021-07-22 DIAGNOSIS — E78.5 HYPERLIPIDEMIA, UNSPECIFIED HYPERLIPIDEMIA TYPE: Chronic | ICD-10-CM

## 2021-07-22 PROCEDURE — 78452 HT MUSCLE IMAGE SPECT MULT: CPT

## 2021-07-22 PROCEDURE — 93306 TTE W/DOPPLER COMPLETE: CPT | Performed by: INTERNAL MEDICINE

## 2021-07-22 PROCEDURE — 93018 CV STRESS TEST I&R ONLY: CPT | Performed by: INTERNAL MEDICINE

## 2021-07-22 PROCEDURE — 93017 CV STRESS TEST TRACING ONLY: CPT

## 2021-07-22 PROCEDURE — 93016 CV STRESS TEST SUPVJ ONLY: CPT | Performed by: INTERNAL MEDICINE

## 2021-07-22 PROCEDURE — G1004 CDSM NDSC: HCPCS

## 2021-07-22 PROCEDURE — A9502 TC99M TETROFOSMIN: HCPCS

## 2021-07-22 PROCEDURE — 78452 HT MUSCLE IMAGE SPECT MULT: CPT | Performed by: INTERNAL MEDICINE

## 2021-07-22 PROCEDURE — 93306 TTE W/DOPPLER COMPLETE: CPT

## 2021-07-22 RX ADMIN — REGADENOSON 0.4 MG: 0.08 INJECTION, SOLUTION INTRAVENOUS at 12:50

## 2021-07-23 ENCOUNTER — OFFICE VISIT (OUTPATIENT)
Dept: WOUND CARE | Facility: HOSPITAL | Age: 71
End: 2021-07-23
Payer: COMMERCIAL

## 2021-07-23 VITALS
HEART RATE: 70 BPM | TEMPERATURE: 97.6 F | DIASTOLIC BLOOD PRESSURE: 95 MMHG | SYSTOLIC BLOOD PRESSURE: 146 MMHG | RESPIRATION RATE: 16 BRPM

## 2021-07-23 DIAGNOSIS — L97.412 DIABETIC ULCER OF RIGHT MIDFOOT ASSOCIATED WITH TYPE 2 DIABETES MELLITUS, WITH FAT LAYER EXPOSED (HCC): Primary | ICD-10-CM

## 2021-07-23 DIAGNOSIS — T81.32XD DEEP DISRUPTION OR DEHISCENCE OF OPERATION WOUND, SUBSEQUENT ENCOUNTER: ICD-10-CM

## 2021-07-23 DIAGNOSIS — E11.621 DIABETIC ULCER OF RIGHT MIDFOOT ASSOCIATED WITH TYPE 2 DIABETES MELLITUS, WITH FAT LAYER EXPOSED (HCC): Primary | ICD-10-CM

## 2021-07-23 PROCEDURE — 13160 SEC CLSR SURG WND/DEHSN XTN: CPT | Performed by: PODIATRIST

## 2021-07-23 RX ORDER — LIDOCAINE HYDROCHLORIDE 40 MG/ML
5 SOLUTION TOPICAL ONCE
Status: COMPLETED | OUTPATIENT
Start: 2021-07-23 | End: 2021-07-23

## 2021-07-23 RX ADMIN — LIDOCAINE HYDROCHLORIDE 5 ML: 40 SOLUTION TOPICAL at 09:16

## 2021-07-23 NOTE — PATIENT INSTRUCTIONS
Orders Placed This Encounter   Procedures    Laceration Repair     This order was created via procedure documentation    Wound cleansing and dressings     Right medial foot open area: sutured closed by Dr Ellen Vance  Foot wound:   Wash your hands with soap and water  Remove old dressing, discard into plastic bag and place in trash  Cleanse the wound with normal saline prior to applying a clean dressing  Do not use tissue or cotton balls  Do not scrub the wound  Pat dry using gauze  Shower no     Apply adaptic and silver alginate to the wound  Cover with ABD pad  Secure with kerlix and tape,  ACE wrap right foot, then Tubifast yellow  Change dressing 3x a week and as needed if soiled  Continue visiting nurses 3x a week, skip days of wound care appointments       Standing Status:   Future     Standing Expiration Date:   7/23/2022

## 2021-07-23 NOTE — PROGRESS NOTES
Patient ID: Margaret Jarrett  is a 73 y o  male Date of Birth 1950     My role is Foot, Ankle and Wound Specialist    Chief Complaint   Patient presents with    Follow Up Wound Care Visit     Right foot wounds       Surgical wound    Subjective:   Emiliano Rendon is now home  He's mostly NWB  He had his free flap and hardware removal  He now has VAC to the hardware removal site         The following portions of the patient's history were reviewed and updated as appropriate:   Patient Active Problem List   Diagnosis    Hyperlipidemia    Type 2 diabetes mellitus with diabetic neuropathy ([address] )    Atrial fibrillation with RVR ([facility] [address] [facility]    Right middle lobe pulmonary nodule    Essential hypertension    Acquired absence of other right toe(s) ([address] )    Benign prostatic hyperplasia with lower urinary tract symptoms    Positive for microalbuminuria    Deep disruption or dehiscence of operation wound    Post-operative state    Aftercare for amputation stump    Ambulatory dysfunction    S/P transmetatarsal amputation of foot, right ([address] )    Sleep disturbance    Acute osteomyelitis of right ankle or foot ([address] )    Anxiety    S/P R ALT flap to right foot     Past Medical History:   Diagnosis Date    Arthritis     Atrial fibrillation ([address] )     Diagnosed 11/2018    Benign prostate hyperplasia 04/2002    Cellulitis     right lower leg     Colon polyp 2006    Diabetes mellitus ([address] )     Hearing aid worn     bilat    Hearing loss     90% loss left ear and 40% right ear    History of clubfoot     "since birth"    History of pneumonia     History of TIA (transient ischemic attack) 04/25/2017 11/30/18 pt denies    Hyperlipidemia 5/15/2014    Hypertension     Infectious viral hepatitis     "cant remember type"    Irregular heart beat     Neuropathy     both feet    Osteomyelitis ([address] )     right great toe    Right middle lobe pulmonary nodule 11/6/2018    Seasickness     Teeth missing     Type 2 diabetes mellitus with diabetic neuropathy ([facility] [address] ) 11/6/2018    Wears glasses     reading    Wound, open     bottom of right foot     Past Surgical History:   Procedure Laterality Date    BUNIONECTOMY Right 12/4/2018    Procedure: 5TH METATARSAL BONE PARTIAL RESECTION, FULL THICKNESS DEBRIDEMENT OF DIABETIC ULCER;  Surgeon: Jose Lockwood DPM;  [facility];  Service: Podiatry    CLUB FOOT RELEASE Bilateral     COLONOSCOPY      COMPLEX WOUND CLOSURE TO EXTREMITY  4/27/2021    Procedure: COMPLEX WOUND CLOSURE TO EXTREMITY: RIGHT FOOT;  Surgeon: Kimberly Oliver MD;  [facility];  Service: Plastics    EXTERNAL FIXATOR APPLICATION Right 2/08/0588    Procedure: Application multiplane external fixation;  Surgeon: Marylynn Cushing, DPM;  [facility];  Service: Podiatry    FOOT HARDWARE REMOVAL Right 2/17/2021    Procedure: REMOVAL EXTERNAL FIXATOR;  Surgeon: Marylynn Cushing, DPM;  [facility];  Service: Podiatry    FOREIGN BODY REMOVAL Right 4/27/2021    Procedure: REMOVAL FOREIGN BODY EXTREMITY: RIGHT FOOT;  Surgeon: Kimberly Oliver MD;  [facility];  Service: Plastics    INCISION AND DRAINAGE OF WOUND Right 2/17/2021    Procedure: INCISION AND DRAINAGE (I&D) EXTREMITY;  Surgeon: Marylynn Cushing, DPM;  [facility];  Service: Podiatry    ORIF FOOT FRACTURE Right 3/4/2021    Procedure: VAC PLACEMENT; SCREW FIXATION RIGHT FOOT FUSION;  Surgeon: Marylynn Cushing, DPM;  [facility];  Service: Podiatry    [address] [place] [address] Right 2/22/2021    Procedure: AMPUTATION TRANSMETATARSAL (TMA), REMOVAL NAIL IM T2 ICF HARDWARE;  Surgeon: Marylynn Cushing, DPM;  [facility];  Service: Podiatry    MO FUSION FOOT BONES,MIDTARSAL,OSTEOTMY Right 2/12/2021    Procedure: foot ARTHRODESIS/FUSION;  Surgeon: Marylynn Cushing, DPM;  [facility];  Service: Podiatry    MO LENGTH/SHORT LEG/ANKL TENDON,SINGLE Right 2/12/2021    Procedure: LENGTHEN TIBIAL TENDON, TRANS HEEL CORD;  Surgeon: Hermilo Hurtado DPM;  [facility];  Service: [address] Right 4/12/2021    Procedure: RECONSTRUCTION MICROSURGICAL W/ FREE FLAP;  Surgeon: Susan Hutson MD;  [facility];  Service: Plastics    TX MUSCLE-SKIN FLAP,TRUNK Right 4/12/2021    Procedure: TAKEBACK RECONSTRUCTION MICROSURGICAL W/ FREE FLAP;  Surgeon: Susan Hutson MD;  [facility];  Service: Plastics    TX MUSCLE-SKIN FLAP,TRUNK Right 4/13/2021    Procedure: RECONSTRUCTION MICROSURGICAL W/ FREE FLAP, RE EXPLORATION, MICRO VASCULAR REVISION;  Surgeon: Susan Hutson MD;  [facility];  Service: Plastics    TX PART [address] [facility] [address] [address] BONE METATARSAL HEAD,EA Right 12/29/2020    Procedure: EXCISION EXOSTOSIS;  Surgeon: Hermilo Hurtado DPM;  [facility];  Service: Chick Sciara SURG WND EXTEN/COMPLIC Right 34[birth year]1913    Procedure: PRIMARY DELAYED CLOSURE;  Surgeon: Hermilo Hurtado DPM;  [facility];  Service: Podiatry    TOE AMPUTATION Right     partial great toe    TONSILLECTOMY      VAC DRESSING APPLICATION Right 8/6/8753    Procedure: APPLICATION VAC DRESSING EXTREMITY;  Surgeon: Susan Hutson MD;  [facility];  Service: Plastics    [facility] Right 3/1/2021    Procedure: DEBRIDEMENT LOWER EXTREMITY ([address] OUT); Surgeon: Susan Hutson MD;  [facility];  Service: Plastics    WOUND DEBRIDEMENT Right 4/7/2021    Procedure: DEBRIDEMENT RIGHT FOOT;  Surgeon: Susan Hutson MD;  [facility];  Service: Plastics    WOUND DEBRIDEMENT Right 4/12/2021    Procedure: DEBRIDEMENT LOWER EXTREMITY [facility] OUT);   Surgeon: Susan Hutson MD;  [facility];  Service: Plastics    WOUND DEBRIDEMENT Right 4/27/2021    Procedure: DEBRIDEMENT LOWER EXTREMITY [facility] OUT): RIGHT FOOT;  Surgeon: Susan Hutson MD; [facility];  Service: Plastics     Social History     Socioeconomic History    Marital status: /Civil Union     Spouse name: Not on file    Number of children: Not on file    Years of education: Not on file    Highest education level: Not on file   Occupational History    Not on file   Tobacco Use    Smoking status: Former Smoker     Types: Cigarettes     Quit date: 1988     Years since quittin 7    Smokeless tobacco: Former User    Tobacco comment: exposure to passive smoke   Vaping Use    Vaping Use: Never used   Substance and Sexual Activity    Alcohol use: Yes     Comment: socially    Drug use: Not Currently    Sexual activity: Yes     Partners: Female   Other Topics Concern    Not on file   Social History Narrative    Not on file     Social Determinants of Health     Financial Resource Strain:     Difficulty of Paying Living Expenses:    Food Insecurity:     Worried About [address] in the Last Year:     [address] in the Last Year:    Transportation Needs:     Lack of Transportation (Medical):      Lack of Transportation (Non-Medical):    Physical Activity:     Days of Exercise per Week:     Minutes of Exercise per Session:    Stress:     Feeling of Stress :    Social Connections:     Frequency of Communication with Friends and Family:     Frequency of Social Gatherings with Friends and Family:     Attends Mandaeism Services:     Active Member of Clubs or Organizations:     Attends Club or Organization Meetings:     Marital Status:    Intimate Partner Violence:     Fear of Current or Ex-Partner:     Emotionally Abused:     Physically Abused:     Sexually Abused:         Current Outpatient Medications:     acetaminophen (Mapap Arthritis Pain) 650 mg CR tablet, Take 1 tablet (650 mg total) by mouth 2 (two) times a day, Disp: 60 tablet, Rfl: 1    aspirin 325 mg tablet, Take 1 tablet (325 mg total) by mouth daily, Disp:  , Rfl: 0   atorvastatin (LIPITOR) 40 mg tablet, Take 1 tablet (40 mg total) by mouth daily, Disp: 90 tablet, Rfl: 3    Blood Glucose Monitoring Suppl (ACCU-CHEK JYOTI SMARTVIEW) w/Device KIT, use to check Blood Sugar as needed, Disp: , Rfl:     diltiazem (CARDIZEM) 90 mg tablet, Take 1 tablet (90 mg total) by mouth 2 (two) times a day, Disp: 60 tablet, Rfl: 0    docusate sodium (COLACE) 100 mg capsule, Take 1 capsule (100 mg total) by mouth 2 (two) times a day, Disp: 10 capsule, Rfl: 0    Eliquis 5 MG, TAKE 1 TABLET BY MOUTH TWICE A DAY, Disp: 60 tablet, Rfl: 12    glimepiride (AMARYL) 2 mg tablet, Take 1 tablet (2 mg total) by mouth 2 (two) times a day, Disp: 60 tablet, Rfl: 1    glucose blood (Accu-Chek SmartView) test strip, Use as instructed BID, Disp: 200 each, Rfl: 6    Januvia 100 MG tablet, Take 1 tablet (100 mg total) by mouth daily, Disp: 132 tablet, Rfl: 3    lisinopril (ZESTRIL) 40 mg tablet, Take 1 tablet (40 mg total) by mouth daily, Disp: 90 tablet, Rfl: 3    melatonin 3 mg, Take 1 tablet (3 mg total) by mouth daily at bedtime, Disp: , Rfl: 0    metFORMIN (GLUCOPHAGE) 500 mg tablet, Take 1 tablet (500 mg total) by mouth 2 (two) times a day with meals, Disp: 180 tablet, Rfl: 6    metoprolol succinate (TOPROL-XL) 25 mg 24 hr tablet, Take (3) Tablets daily by mouth PO BID, Disp: 60 tablet, Rfl: 12    polyethylene glycol (MIRALAX) 17 g packet, Take 17 g by mouth daily, Disp:  , Rfl: 0    Sodium Chloride Flush (NORMAL SALINE FLUSH IV), Infuse 10 mL into a venous catheter 4 (four) times a day, Disp: , Rfl:   Family History   Problem Relation Age of Onset    Diabetes Father         mellitus      Review of Systems   Constitutional: Positive for activity change  Negative for appetite change, chills, diaphoresis, fatigue, fever and unexpected weight change  Skin: Positive for wound         Allergies  Simvastatin and Itraconazole    Objective:  /95   Pulse 70   Temp 97 6 °F (36 4 °C)   Resp 16 Physical Exam  Vitals and nursing note reviewed  Constitutional:       General: He is not in acute distress  Appearance: Normal appearance  He is not ill-appearing, toxic-appearing or diaphoretic  Cardiovascular:      Pulses: Normal pulses  Heart sounds: Normal heart sounds  Pulmonary:      Effort: Pulmonary effort is normal       Breath sounds: Normal breath sounds  Musculoskeletal:      Comments: TMA Right foot   Skin:     Comments: See Wound Assessment  Neurological:      Mental Status: He is alert  Wound 06/04/21 Diabetic Ulcer Foot Anterior;Right (Active)   Wound Image   07/23/21 0912   Wound Description Pink;Probes to bone;Yellow;Epithelialization;Granulation tissue;Slough 07/23/21 0912   Lavern-wound Assessment Scar Tissue; Intact; Maceration 07/23/21 0912   Wound Length (cm) 2 cm 07/23/21 0912   Wound Width (cm) 20 cm 07/23/21 0912   Wound Depth (cm) 1 8 cm 07/23/21 0912   Wound Surface Area (cm^2) 40 cm^2 07/23/21 0912   Wound Volume (cm^3) 72 cm^3 07/23/21 0912   Calculated Wound Volume (cm^3) 72 cm^3 07/23/21 0912   Change in Wound Size % -130 77 07/23/21 0912   Drainage Amount Moderate 07/23/21 0912   Drainage Description Serosanguineous; Yellow 07/23/21 0912   Non-staged Wound Description Full thickness 07/23/21 0912   Dressing Wound V A C  07/02/21 0847           Wound 06/04/21 Diabetic Ulcer Foot Anterior;Right (Active)   Wound Image   07/23/21 0912   Wound Description Pink;Probes to bone;Yellow;Epithelialization;Granulation tissue;Slough 07/23/21 0912   Lavern-wound Assessment Scar Tissue; Intact; Maceration 07/23/21 0912   Wound Length (cm) 2 cm 07/23/21 0912   Wound Width (cm) 20 cm 07/23/21 0912   Wound Depth (cm) 1 8 cm 07/23/21 0912   Wound Surface Area (cm^2) 40 cm^2 07/23/21 0912   Wound Volume (cm^3) 72 cm^3 07/23/21 0912   Calculated Wound Volume (cm^3) 72 cm^3 07/23/21 0912   Change in Wound Size % -130 77 07/23/21 0912   Drainage Amount Moderate 07/23/21 0912 Drainage Description Serosanguineous; Yellow 07/23/21 0912   Non-staged Wound Description Full thickness 07/23/21 0912   Dressing Wound V A C  07/02/21 0847                         Diagnosis:  1  Diabetic ulcer of right 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, with fat layer exposed ([facility] [address] )  -     lidocaine (XYLOCAINE) 4 % topical solution 5 mL  -     Laceration Repair  -     Wound cleansing and dressings; Future    2  Deep disruption or dehiscence of operation wound, subsequent encounter        Diagnosis ICD-10-CM Associated Orders   1  Diabetic ulcer of right 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, with fat layer exposed ([address] )  E11 621 lidocaine (XYLOCAINE) 4 % topical solution 5 mL    L97 [id number] Laceration Repair     Wound cleansing and dressings   2  Deep disruption or dehiscence of operation wound, subsequent encounter  T81  32XD         ASSESSMENT    Problems:    Chronic illness / Problem not at goal with exacerbation, progression or side effects of treatment  1  Surgical wound      PLAN    Discussion of Management /  Recommendations  I see two options at this point:    #1 Continue NPWT  #2 Hold NPWT and attempt a delayed primary closure; if it doesn't work we can reinstitute NPWT    Procedures Performed  He wants to try #2  A standard timeout was performed according to [facility] including patient identification and laterality  Secondary closure of the wound was performed  The Right foot was prepped and draped and attention was directed to the open surgical wound  Sharp preparatory debridement was performed with a #10 scalpel and disposeable curette and rongeur to remove debris and devitalized tissue down to healthy viable bleeding tissue  The wound was copiously lavaged with NSS and soaked for 5 minutes with full strength betadine solution  A 1mm margin of skin edge was resected circumferentially around the wound   Retention sutures were placed utilizing 2-0 nylon vertical mattress sutures to close the wound edges and two reinforcing 2-0 prolene retention sutures to take tension of the primary repair  Patient did not experience any significant discomfort or blood loss  I recommended strict NWB on the foot, elevation  A DSD was applied to be kept clean, dry and intact  Laceration Repair    Date/Time: 7/23/2021 9:51 AM  Performed by: Lizeth Tobin DPM  Authorized by: Lizeth Tobin DPM   Consent: Verbal consent obtained  Risks and benefits: risks, benefits and alternatives were discussed  Consent given by: patient  Patient understanding: patient states understanding of the procedure being performed  Patient identity confirmed: verbally with patient  Time out: Immediately prior to procedure a "time out" was called to verify the correct patient, procedure, equipment, support staff and site/side marked as required  Body area: lower extremity  Location details: right foot  Wound length (cm): Wound repair was 4 0cm long  Sedation:  Patient sedated: no      Wound Dehiscence:    Secondary closure or dehiscence: complex    Procedure Details:  Irrigation solution: saline  Irrigation method: syringe  Amount of cleaning: extensive  Debridement: extensive  Degree of undermining: extensive  Wound skin closure material used: 2-0 nylon, 2-0 prolene    Number of sutures: 7  Technique: vertical mattress and retention suture  Approximation: close  Approximation difficulty: complex  Dressing: 4x4 sterile gauze  Patient tolerance: patient tolerated the procedure well with no immediate complications

## 2021-07-27 LAB
CHEST PAIN STATEMENT: NORMAL
MAX DIASTOLIC BP: 74 MMHG
MAX HEART RATE: 75 BPM
MAX PREDICTED HEART RATE: 151 BPM
MAX. SYSTOLIC BP: 138 MMHG
PROTOCOL NAME: NORMAL
REASON FOR TERMINATION: NORMAL
TARGET HR FORMULA: NORMAL
TEST INDICATION: NORMAL
TIME IN EXERCISE PHASE: NORMAL

## 2021-07-30 ENCOUNTER — OFFICE VISIT (OUTPATIENT)
Dept: WOUND CARE | Facility: HOSPITAL | Age: 71
End: 2021-07-30
Payer: COMMERCIAL

## 2021-07-30 VITALS
RESPIRATION RATE: 16 BRPM | TEMPERATURE: 97.8 F | HEART RATE: 72 BPM | SYSTOLIC BLOOD PRESSURE: 181 MMHG | DIASTOLIC BLOOD PRESSURE: 90 MMHG

## 2021-07-30 DIAGNOSIS — T81.32XD DEEP DISRUPTION OR DEHISCENCE OF OPERATION WOUND, SUBSEQUENT ENCOUNTER: Primary | ICD-10-CM

## 2021-07-30 PROCEDURE — G0463 HOSPITAL OUTPT CLINIC VISIT: HCPCS | Performed by: PODIATRIST

## 2021-07-30 PROCEDURE — 99024 POSTOP FOLLOW-UP VISIT: CPT | Performed by: PODIATRIST

## 2021-07-30 PROCEDURE — 99212 OFFICE O/P EST SF 10 MIN: CPT | Performed by: PODIATRIST

## 2021-07-30 RX ORDER — CEPHALEXIN 500 MG/1
500 CAPSULE ORAL EVERY 6 HOURS SCHEDULED
Qty: 28 CAPSULE | Refills: 0 | Status: SHIPPED | OUTPATIENT
Start: 2021-07-30 | End: 2021-08-06

## 2021-07-30 NOTE — PROGRESS NOTES
Patient ID: Bolivar Shen  is a 73 y o  male Date of Birth 1950     My role is Foot, Ankle and Wound Specialist    Chief Complaint   Patient presents with    Follow Up Wound Care Visit     right foot wound       S/P delayed primary closure of wound dehiscence    Subjective:   Brittnee Main is here one week after delayed primary closure of wound  He slipped and hit the foot yesterday when out and started noting some drainage through the dressings after  He denies pain, fevers         The following portions of the patient's history were reviewed and updated as appropriate:   Patient Active Problem List   Diagnosis    Hyperlipidemia    Type 2 diabetes mellitus with diabetic neuropathy ([address] )    Atrial fibrillation with RVR ([address] )    Right middle lobe pulmonary nodule    Essential hypertension    Acquired absence of other right toe(s) ([address] )    Benign prostatic hyperplasia with lower urinary tract symptoms    Positive for microalbuminuria    Deep disruption or dehiscence of operation wound    Post-operative state    Aftercare for amputation stump    Ambulatory dysfunction    S/P transmetatarsal amputation of foot, right (HCC)    Sleep disturbance    Acute osteomyelitis of right ankle or foot ([address] )    Anxiety    S/P R ALT flap to right foot     Past Medical History:   Diagnosis Date    Arthritis     Atrial fibrillation ([address] )     Diagnosed 11/2018    Benign prostate hyperplasia 04/2002    Cellulitis     right lower leg     Colon polyp 2006    Diabetes mellitus ([address] )     Hearing aid worn     bilat    Hearing loss     90% loss left ear and 40% right ear    History of clubfoot     "since birth"    History of pneumonia     History of TIA (transient ischemic attack) 04/25/2017 11/30/18 pt denies    Hyperlipidemia 5/15/2014    Hypertension     Infectious viral hepatitis     "cant remember type"    Irregular heart beat     Neuropathy     both feet    Osteomyelitis ([address] )     right great toe    Right middle lobe pulmonary nodule 11/6/2018    Seasickness     Teeth missing     Type 2 diabetes mellitus with diabetic neuropathy ([facility] ) 11/6/2018    Wears glasses     reading    Wound, open     bottom of right foot     Past Surgical History:   Procedure Laterality Date    BUNIONECTOMY Right 12/4/2018    Procedure: 5TH METATARSAL BONE PARTIAL RESECTION, FULL THICKNESS DEBRIDEMENT OF DIABETIC ULCER;  Surgeon: Ann Monroe DPM;  [facility];  Service: Podiatry    CLUB FOOT RELEASE Bilateral     COLONOSCOPY      COMPLEX WOUND CLOSURE TO EXTREMITY  4/27/2021    Procedure: COMPLEX WOUND CLOSURE TO EXTREMITY: RIGHT FOOT;  Surgeon: Savannah Brownlee MD;  [facility];  Service: Plastics    EXTERNAL FIXATOR APPLICATION Right 0/15/2031    Procedure: Application multiplane external fixation;  Surgeon: Efrain Hill DPM;  [facility];  Service: Podiatry    FOOT HARDWARE REMOVAL Right 2/17/2021    Procedure: REMOVAL EXTERNAL FIXATOR;  Surgeon: Efrain Hill DPM;  [facility];  Service: Podiatry    FOREIGN BODY REMOVAL Right 4/27/2021    Procedure: REMOVAL FOREIGN BODY EXTREMITY: RIGHT FOOT;  Surgeon: Savannah Brownlee MD;  [facility];  Service: Plastics    INCISION AND DRAINAGE OF WOUND Right 2/17/2021    Procedure: INCISION AND DRAINAGE (I&D) EXTREMITY;  Surgeon: Efrain Hill DPM;  [facility];  Service: Podiatry    ORIF FOOT FRACTURE Right 3/4/2021    Procedure: VAC PLACEMENT; SCREW FIXATION RIGHT FOOT FUSION;  Surgeon: Efrain Hill DPM;  [facility];  Service: Podiatry    [address] [place] [address] Right 2/22/2021    Procedure: AMPUTATION TRANSMETATARSAL (TMA), REMOVAL NAIL IM T2 ICF HARDWARE;  Surgeon: Efrain Hill DPM;  [facility];  Service: Podiatry    AK FUSION FOOT BONES,MIDTARSAL,OSTEOTMY Right 2/12/2021    Procedure: foot ARTHRODESIS/FUSION;  Surgeon: Efrain Hill DPM;  [facility]; Service: Podiatry    MI LENGTH/SHORT LEG/ANKL TENDON,SINGLE Right 2/12/2021    Procedure: LENGTHEN TIBIAL TENDON, TRANS HEEL CORD;  Surgeon: Johnnie Conteh DPM;  [facility];  Service: Podiatry    MI MUSCLE-SKIN FLAP,TRUNK Right 4/12/2021    Procedure: RECONSTRUCTION MICROSURGICAL W/ FREE FLAP;  Surgeon: Ion Wright MD;  [facility];  Service: Plastics    MI MUSCLE-SKIN FLAP,TRUNK Right 4/12/2021    Procedure: TAKEBACK RECONSTRUCTION MICROSURGICAL W/ FREE FLAP;  Surgeon: Ion Wright MD;  [facility];  Service: Plastics    MI MUSCLE-SKIN FLAP,TRUNK Right 4/13/2021    Procedure: RECONSTRUCTION MICROSURGICAL W/ FREE FLAP, RE EXPLORATION, MICRO VASCULAR REVISION;  Surgeon: Ion Wright MD;  [facility];  Service: Plastics    MI PART [facility] [facility] BONE METATARSAL HEAD,EA Right 12/29/2020    Procedure: EXCISION EXOSTOSIS;  Surgeon: Johnnie Conteh DPM;  [facility];  Service: Ollie Merlin SURG WND EXTEN/COMPLIC Right 27/49/9499    Procedure: PRIMARY DELAYED CLOSURE;  Surgeon: Johnnie Conteh DPM;  [facility];  Service: Podiatry    TOE AMPUTATION Right     partial great toe    TONSILLECTOMY      VAC DRESSING APPLICATION Right 5/8/1898    Procedure: APPLICATION VAC DRESSING EXTREMITY;  Surgeon: Ion Wright MD;  [facility];  Service: Plastics    [address] Right 3/1/2021    Procedure: DEBRIDEMENT LOWER EXTREMITY ([address] OUT); Surgeon: Ion Wright MD;  [facility];  Service: Plastics    WOUND DEBRIDEMENT Right 4/7/2021    Procedure: DEBRIDEMENT RIGHT FOOT;  Surgeon: Ion Wright MD;  [facility];  Service: Plastics    WOUND DEBRIDEMENT Right 4/12/2021    Procedure: DEBRIDEMENT LOWER EXTREMITY [facility] OUT);   Surgeon: Ion Wright MD;  [facility];  Service: Plastics    WOUND DEBRIDEMENT Right 4/27/2021 Procedure: DEBRIDEMENT LOWER EXTREMITY [facility] OUT): RIGHT FOOT;  Surgeon: Carmen Anguiano MD;  [facility];  Service: Plastics     Social History     Socioeconomic History    Marital status: /Civil Union     Spouse name: None    Number of children: None    Years of education: None    Highest education level: None   Occupational History    None   Tobacco Use    Smoking status: Former Smoker     Types: Cigarettes     Quit date: 1988     Years since quittin 7    Smokeless tobacco: Former User    Tobacco comment: exposure to passive smoke   Vaping Use    Vaping Use: Never used   Substance and Sexual Activity    Alcohol use: Yes     Comment: socially    Drug use: Not Currently    Sexual activity: Yes     Partners: Female   Other Topics Concern    None   Social History Narrative    None     Social Determinants of Health     Financial Resource Strain:     Difficulty of Paying Living Expenses:    Food Insecurity:     Worried About Running Out of Food in the Last Year:     Ran Out of Food in the Last Year:    Transportation Needs:     Lack of Transportation (Medical):      Lack of Transportation (Non-Medical):    Physical Activity:     Days of Exercise per Week:     Minutes of Exercise per Session:    Stress:     Feeling of Stress :    Social Connections:     Frequency of Communication with Friends and Family:     Frequency of Social Gatherings with Friends and Family:     Attends Sikh Services:     Active Member of Clubs or Organizations:     Attends Club or Organization Meetings:     Marital Status:    Intimate Partner Violence:     Fear of Current or Ex-Partner:     Emotionally Abused:     Physically Abused:     Sexually Abused:         Current Outpatient Medications:     acetaminophen (Mapap Arthritis Pain) 650 mg CR tablet, Take 1 tablet (650 mg total) by mouth 2 (two) times a day, Disp: 60 tablet, Rfl: 1    aspirin 325 mg tablet, Take 1 tablet (325 mg total) by mouth daily, Disp:  , Rfl: 0    atorvastatin (LIPITOR) 40 mg tablet, Take 1 tablet (40 mg total) by mouth daily, Disp: 90 tablet, Rfl: 3    Blood Glucose Monitoring Suppl (ACCU-CHEK JYOTI SMARTVIEW) w/Device KIT, use to check Blood Sugar as needed, Disp: , Rfl:     cephalexin (KEFLEX) 500 mg capsule, Take 1 capsule (500 mg total) by mouth every 6 (six) hours for 7 days, Disp: 28 capsule, Rfl: 0    diltiazem (CARDIZEM) 90 mg tablet, Take 1 tablet (90 mg total) by mouth 2 (two) times a day, Disp: 60 tablet, Rfl: 0    docusate sodium (COLACE) 100 mg capsule, Take 1 capsule (100 mg total) by mouth 2 (two) times a day, Disp: 10 capsule, Rfl: 0    Eliquis 5 MG, TAKE 1 TABLET BY MOUTH TWICE A DAY, Disp: 60 tablet, Rfl: 12    glimepiride (AMARYL) 2 mg tablet, Take 1 tablet (2 mg total) by mouth 2 (two) times a day, Disp: 60 tablet, Rfl: 1    glucose blood (Accu-Chek SmartView) test strip, Use as instructed BID, Disp: 200 each, Rfl: 6    Januvia 100 MG tablet, Take 1 tablet (100 mg total) by mouth daily, Disp: 132 tablet, Rfl: 3    lisinopril (ZESTRIL) 40 mg tablet, Take 1 tablet (40 mg total) by mouth daily, Disp: 90 tablet, Rfl: 3    melatonin 3 mg, Take 1 tablet (3 mg total) by mouth daily at bedtime, Disp: , Rfl: 0    metFORMIN (GLUCOPHAGE) 500 mg tablet, Take 1 tablet (500 mg total) by mouth 2 (two) times a day with meals, Disp: 180 tablet, Rfl: 6    metoprolol succinate (TOPROL-XL) 25 mg 24 hr tablet, Take (3) Tablets daily by mouth PO BID, Disp: 60 tablet, Rfl: 12    polyethylene glycol (MIRALAX) 17 g packet, Take 17 g by mouth daily, Disp:  , Rfl: 0    Sodium Chloride Flush (NORMAL SALINE FLUSH IV), Infuse 10 mL into a venous catheter 4 (four) times a day, Disp: , Rfl:   Family History   Problem Relation Age of Onset    Diabetes Father         mellitus      Review of Systems   Constitutional: Positive for activity change   Negative for appetite change, chills, diaphoresis, fatigue, fever and unexpected weight change  Skin: Positive for wound  Allergies  Simvastatin and Itraconazole    Objective:  BP (!) 181/90   Pulse 72   Temp 97 8 °F (36 6 °C)   Resp 16     Physical Exam  Vitals and nursing note reviewed  Constitutional:       General: He is not in acute distress  Appearance: Normal appearance  He is not ill-appearing, toxic-appearing or diaphoretic  Cardiovascular:      Pulses: Normal pulses  Heart sounds: Normal heart sounds  Pulmonary:      Effort: Pulmonary effort is normal       Breath sounds: Normal breath sounds  Musculoskeletal:      Comments: TMA Right foot   Skin:     Comments: See Wound Assessment  Neurological:      Mental Status: He is alert  Wound 06/04/21 Diabetic Ulcer Foot Anterior;Right (Active)   Wound Image     07/30/21 0855   Wound Description Epithelialization;Pink;Slough 07/30/21 0858   Lavern-wound Assessment Scar Tissue; Maceration 07/30/21 0858   Wound Length (cm) 2 cm 07/30/21 0858   Wound Width (cm) 4 cm 07/30/21 0858   Wound Depth (cm) 0 2 cm 07/30/21 0858   Wound Surface Area (cm^2) 8 cm^2 07/30/21 0858   Wound Volume (cm^3) 1 6 cm^3 07/30/21 0858   Calculated Wound Volume (cm^3) 1 6 cm^3 07/30/21 0858   Change in Wound Size % 94 87 07/30/21 0858   Drainage Amount Moderate 07/30/21 0858   Drainage Description Serosanguineous; Yellow 07/30/21 0858   Non-staged Wound Description Full thickness 07/30/21 0858   Dressing Wound V A C  07/02/21 0847   Dressing Status Leaking (Comment) 07/30/21 0858           Wound 06/04/21 Diabetic Ulcer Foot Anterior;Right (Active)   Wound Image     07/30/21 0855   Wound Description Epithelialization;Pink;Slough 07/30/21 0858   Lavern-wound Assessment Scar Tissue; Maceration 07/30/21 0858   Wound Length (cm) 2 cm 07/30/21 0858   Wound Width (cm) 4 cm 07/30/21 0858   Wound Depth (cm) 0 2 cm 07/30/21 0858   Wound Surface Area (cm^2) 8 cm^2 07/30/21 0858   Wound Volume (cm^3) 1 6 cm^3 07/30/21 0858   Calculated Wound Volume (cm^3) 1 6 cm^3 07/30/21 0858   Change in Wound Size % 94 87 07/30/21 0858   Drainage Amount Moderate 07/30/21 0858   Drainage Description Serosanguineous; Yellow 07/30/21 0858   Non-staged Wound Description Full thickness 07/30/21 0858   Dressing Wound V A C  07/02/21 0847   Dressing Status Leaking (Comment) 07/30/21 0858                         Diagnosis:  1  Deep disruption or dehiscence of operation wound, subsequent encounter  -     cephalexin (KEFLEX) 500 mg capsule; Take 1 capsule (500 mg total) by mouth every 6 (six) hours for 7 days        Diagnosis ICD-10-CM Associated Orders   1  Deep disruption or dehiscence of operation wound, subsequent encounter  T81  32XD cephalexin (KEFLEX) 500 mg capsule        ASSESSMENT  Wound s/p delayed primary closure; possible cellulitis    PLAN    Discussion of Management /  Recommendations  Sutures left intact  Will start oral keflex 500 mg QID x 7 days  Follow up one week

## 2021-07-30 NOTE — PATIENT INSTRUCTIONS
Orders Placed This Encounter   Procedures    Wound cleansing and dressings     Right foot wound:  Wound care 3x/wk  Do not get wet with shower water  At wound center today:  Steri strips applied to approximate wound edges, leave intact  Covered with adaptic and maxorb ag (change that part of the dressing)  Cover with ABD(s) secure with kerlix and light ace  Standing Status:   Future     Standing Expiration Date:   7/30/2022    Wound off loading     Do not bear weight on the right foot  Elevate when seated       Standing Status:   Future     Standing Expiration Date:   7/30/2022    Wound miscellaneous orders     Take antibiotics as prescribed     Standing Status:   Future     Standing Expiration Date:   7/30/2022    Wound home care     SL [facility]     Standing Status:   Future     Standing Expiration Date:   7/30/2022

## 2021-08-06 ENCOUNTER — OFFICE VISIT (OUTPATIENT)
Dept: WOUND CARE | Facility: HOSPITAL | Age: 71
End: 2021-08-06
Payer: COMMERCIAL

## 2021-08-06 VITALS
RESPIRATION RATE: 18 BRPM | HEART RATE: 78 BPM | TEMPERATURE: 97.3 F | DIASTOLIC BLOOD PRESSURE: 94 MMHG | SYSTOLIC BLOOD PRESSURE: 187 MMHG

## 2021-08-06 DIAGNOSIS — L97.412 DIABETIC ULCER OF RIGHT MIDFOOT ASSOCIATED WITH TYPE 2 DIABETES MELLITUS, WITH FAT LAYER EXPOSED (HCC): Primary | ICD-10-CM

## 2021-08-06 DIAGNOSIS — E11.621 DIABETIC ULCER OF RIGHT MIDFOOT ASSOCIATED WITH TYPE 2 DIABETES MELLITUS, WITH FAT LAYER EXPOSED (HCC): Primary | ICD-10-CM

## 2021-08-06 PROCEDURE — 99213 OFFICE O/P EST LOW 20 MIN: CPT | Performed by: PODIATRIST

## 2021-08-06 PROCEDURE — 99024 POSTOP FOLLOW-UP VISIT: CPT | Performed by: PODIATRIST

## 2021-08-06 PROCEDURE — G0463 HOSPITAL OUTPT CLINIC VISIT: HCPCS | Performed by: PODIATRIST

## 2021-08-06 NOTE — PATIENT INSTRUCTIONS
Orders Placed This Encounter   Procedures    Wound cleansing and dressings     Right foot wound:  Wound care 3x/wk  Do not get wet with shower water  At wound center today:  Steri strips applied to approximate wound edges, leave intact  Covered with adaptic and maxorb ag (change that part of the  dressing)  Cover with ABD(s) secure with kerlix and light ace  Left Thigh wound  Wash your hands with soap and water  Cleanse the wound with mild soap and water prior to applying a clean dressing  Shower yes   Apply non adhering type dressing to the wound  Cover with gauze and tape  Change dressing daily    Treatments above were completed [facility] at the [facility]      Follow up in 3 weeks     Standing Status:   Future     Standing Expiration Date:   8/6/2022    Wound off loading     Do not bear weight on the right foot  Elevate when seated       Standing Status:   Future     Standing Expiration Date:   8/6/2022    Wound home care     Continue with [facility] for wound care     Standing Status:   Future     Standing Expiration Date:   8/6/2022

## 2021-08-09 ENCOUNTER — OFFICE VISIT (OUTPATIENT)
Dept: CARDIOLOGY CLINIC | Facility: CLINIC | Age: 71
End: 2021-08-09
Payer: COMMERCIAL

## 2021-08-09 VITALS
SYSTOLIC BLOOD PRESSURE: 138 MMHG | HEIGHT: 76 IN | OXYGEN SATURATION: 98 % | HEART RATE: 77 BPM | BODY MASS INDEX: 28.36 KG/M2 | DIASTOLIC BLOOD PRESSURE: 92 MMHG

## 2021-08-09 DIAGNOSIS — I10 ESSENTIAL HYPERTENSION: ICD-10-CM

## 2021-08-09 DIAGNOSIS — I48.19 ATRIAL FIBRILLATION, PERSISTENT (HCC): Primary | ICD-10-CM

## 2021-08-09 PROCEDURE — 93000 ELECTROCARDIOGRAM COMPLETE: CPT | Performed by: INTERNAL MEDICINE

## 2021-08-09 PROCEDURE — 1036F TOBACCO NON-USER: CPT | Performed by: INTERNAL MEDICINE

## 2021-08-09 PROCEDURE — 1160F RVW MEDS BY RX/DR IN RCRD: CPT | Performed by: INTERNAL MEDICINE

## 2021-08-09 PROCEDURE — 99213 OFFICE O/P EST LOW 20 MIN: CPT | Performed by: INTERNAL MEDICINE

## 2021-08-09 NOTE — PATIENT INSTRUCTIONS
Stop aspirin due to bleeding risk  Continue Eliquis  Continue Metoprolol and Diltiazem  Please verify, current medication list with home medications and let us know if there is any discrepancy

## 2021-08-09 NOTE — PROGRESS NOTES
[facility] [address]  [address]  [facility] [address]  Phone#  [phone]  Fax#  [phone]  [facility] Cardiology Office Follow-up Visit             NAME: Norah Ross  AGE: 73 y o  SEX: male   : 1950   MRN: [id number]    DATE: 2021  TIME: 3:51 PM    Assessment and plan:    1  Atrial fibrillation, persistent ([address] )  Assessment & Plan:  Stop aspirin  Continue Eliquis to lower stroke risk  Continue diltiazem and metoprolol for rate control  Currently he is mildly symptomatic from the atrial fibrillation but the ventricular rate is fairly well controlled  Defer further invasive strategies for now  Continue rate control  Orders:  -     POCT ECG    2  Essential hypertension  Assessment & Plan:  BP Readings from Last 3 Encounters:   21 138/92   21 (!) 187/94   21 (!) 181/90       BP is now better controlled  Continue current medications  Lifestyle modification including increased physical activity, low-salt diet rich in fruits and vegetables, avoidance of alcohol intake and maintaining healthy weight  Chief Complaint   Patient presents with    Follow-up     6 week per João Clancy, post stress test    Palpitations     comes and goes       HPI:    Norah Ross  is a 73y o -year-old male who presents to the cardiology clinic for follow up  He has had history of persistent atrial fibrillation with rapid ventricular response in the postoperative setting during placement of a graft on his right foot transmetatarsal amputation stump  I saw him in the hospital back in April for this  He has had a fairly complicated hospital stay and postoperative recovery  He continues to have dressing changes performed in the area of the stump  No fever or chills  Reports intermittent palpitations  He is tolerating his statins  He is taking a full-dose aspirin along with the Eliquis for unclear reasons    I have advised him to stop the aspirin to lower his bleeding risk  Does not have a clear-cut indication for full-dose aspirin at the current time  I reviewed the results of his nuclear stress test   He has a small area of inferoseptal perfusion abnormality which possibly could be a result of diaphragmatic attenuation  There was no segmental wall motion abnormalities seen  He does have intermediate CAD risk  He has no residual angina  He has become very sedentary after his amputation  Previously was very active physically  Past history, family history, social history, current medications, vital signs, recent lab and imaging studies and  prior cardiology studies reviewed independently on this visit  Cardiology Problem list:  CAD: Coronary calcification  Atypical angina  Persistent atrial fibrillation: Eliquis, diltiazem, Toprol-XL  7/21: Echo EF 55, mild MR, mild TR, biatrial enlargement  7/21- SPECT: EF 50,  small infarct in the inferoseptal region vs diaphragmatic atten  Hypertension  Dyslipidemia  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, complicated by AFib/RVR    BP Readings from Last 4 Encounters:   08/09/21 138/92   08/06/21 (!) 187/94   07/30/21 (!) 181/90   07/23/21 146/95      Wt Readings from Last 3 Encounters:   06/04/21 106 kg (233 lb)   05/11/21 97 5 kg (215 lb)   05/03/21 96 8 kg (213 lb 6 4 oz)       Lab Results   Component Value Date    LDLCALC 72 11/08/2018     Lab Results   Component Value Date    CREATININE 0 80 06/07/2021        ALLERGIES:  Allergies   Allergen Reactions    Simvastatin Myalgia    Itraconazole Other (See Comments)     Pt denies knowledge of allergy             Current Outpatient Medications:     acetaminophen (Mapap Arthritis Pain) 650 mg CR tablet, Take 1 tablet (650 mg total) by mouth 2 (two) times a day, Disp: 60 tablet, Rfl: 1    atorvastatin (LIPITOR) 40 mg tablet, Take 1 tablet (40 mg total) by mouth daily, Disp: 90 tablet, Rfl: 3    Blood Glucose Monitoring Suppl (ACCU-CHEK JYOTI SMARTVIEW) w/Device KIT, use to check Blood Sugar as needed, Disp: , Rfl:     diltiazem (CARDIZEM) 90 mg tablet, Take 1 tablet (90 mg total) by mouth 2 (two) times a day, Disp: 60 tablet, Rfl: 0    docusate sodium (COLACE) 100 mg capsule, Take 1 capsule (100 mg total) by mouth 2 (two) times a day, Disp: 10 capsule, Rfl: 0    Eliquis 5 MG, TAKE 1 TABLET BY MOUTH TWICE A DAY, Disp: 60 tablet, Rfl: 12    glimepiride (AMARYL) 2 mg tablet, Take 1 tablet (2 mg total) by mouth 2 (two) times a day, Disp: 60 tablet, Rfl: 1    glucose blood (Accu-Chek SmartView) test strip, Use as instructed BID, Disp: 200 each, Rfl: 6    Januvia 100 MG tablet, Take 1 tablet (100 mg total) by mouth daily, Disp: 132 tablet, Rfl: 3    lisinopril (ZESTRIL) 40 mg tablet, Take 1 tablet (40 mg total) by mouth daily, Disp: 90 tablet, Rfl: 3    metFORMIN (GLUCOPHAGE) 500 mg tablet, Take 1 tablet (500 mg total) by mouth 2 (two) times a day with meals, Disp: 180 tablet, Rfl: 6    metoprolol succinate (TOPROL-XL) 25 mg 24 hr tablet, Take (3) Tablets daily by mouth PO BID, Disp: 60 tablet, Rfl: 12    Review of Systems   Constitutional: Positive for fatigue  HENT: Negative for congestion  Respiratory: Negative for chest tightness and shortness of breath  Cardiovascular: Positive for palpitations  Negative for chest pain  Gastrointestinal: Negative for abdominal pain  Endocrine: Negative for polyuria  Genitourinary: Negative for hematuria  Musculoskeletal: Positive for arthralgias and gait problem  Skin: Positive for wound  Neurological: Negative for dizziness and syncope  Hematological: Bruises/bleeds easily         Past Medical History:   Diagnosis Date    Arthritis     Atrial fibrillation [facility])     Diagnosed 11/2018    Benign prostate hyperplasia 04/2002    Cellulitis     right lower leg     Colon polyp 2006    Diabetes mellitus ([facility] [address] )     Hearing aid worn     bilat    Hearing loss 90% loss left ear and 40% right ear    History of clubfoot     "since birth"    History of pneumonia     History of TIA (transient ischemic attack) 04/25/2017 11/30/18 pt denies    Hyperlipidemia 5/15/2014    Hypertension     Infectious viral hepatitis     "cant remember type"    Irregular heart beat     Neuropathy     both feet    Osteomyelitis [address] [address] )     right great toe    Right middle lobe pulmonary nodule 11/6/2018    Seasickness     Teeth missing     Type 2 diabetes mellitus with diabetic neuropathy ([facility] [address] ) 11/6/2018    Wears glasses     reading    Wound, open     bottom of right foot     Past Surgical History:   Procedure Laterality Date    BUNIONECTOMY Right 12/4/2018    Procedure: 5TH METATARSAL BONE PARTIAL RESECTION, FULL THICKNESS DEBRIDEMENT OF DIABETIC ULCER;  Surgeon: Elia Flores DPM;  [facility];  Service: Podiatry    CLUB FOOT RELEASE Bilateral     COLONOSCOPY      COMPLEX WOUND CLOSURE TO EXTREMITY  4/27/2021    Procedure: COMPLEX WOUND CLOSURE TO EXTREMITY: RIGHT FOOT;  Surgeon: Genevieve Escalona MD;  [facility];  Service: Plastics    EXTERNAL FIXATOR APPLICATION Right 4/16/7864    Procedure: Application multiplane external fixation;  Surgeon: Marie Astudillo DPM;  [facility];  Service: Podiatry    FOOT HARDWARE REMOVAL Right 2/17/2021    Procedure: REMOVAL EXTERNAL FIXATOR;  Surgeon: Marie Astudillo DPM;  [facility];  Service: Podiatry    FOREIGN BODY REMOVAL Right 4/27/2021    Procedure: REMOVAL FOREIGN BODY EXTREMITY: RIGHT FOOT;  Surgeon: Genevieve Escalona MD;  [facility];  Service: Plastics    INCISION AND DRAINAGE OF WOUND Right 2/17/2021    Procedure: INCISION AND DRAINAGE (I&D) EXTREMITY;  Surgeon: Marie Astudillo DPM;  [facility];  Service: Podiatry    ORIF FOOT FRACTURE Right 3/4/2021    Procedure: VAC PLACEMENT; SCREW FIXATION RIGHT FOOT FUSION;  Surgeon: Marie Astudillo DPM; [facility];  Service: [address] Right 2/22/2021    Procedure: AMPUTATION TRANSMETATARSAL (TMA), REMOVAL NAIL IM T2 ICF HARDWARE;  Surgeon: Rosetta Young DPM;  [facility];  Service: Podiatry    IN FUSION FOOT BONES,MIDTARSAL,OSTEOTMY Right 2/12/2021    Procedure: foot ARTHRODESIS/FUSION;  Surgeon: Rosetta Young DPM;  [facility];  Service: Podiatry    IN LENGTH/SHORT LEG/ANKL TENDON,SINGLE Right 2/12/2021    Procedure: LENGTHEN TIBIAL TENDON, TRANS HEEL CORD;  Surgeon: Rosetta Young DPM;  [facility];  Service: Podiatry    IN MUSCLE-SKIN FLAP,TRUNK Right 4/12/2021    Procedure: RECONSTRUCTION MICROSURGICAL W/ FREE FLAP;  Surgeon: Malika Napier MD;  [facility];  Service: Plastics    IN MUSCLE-SKIN FLAP,TRUNK Right 4/12/2021    Procedure: TAKEBACK RECONSTRUCTION MICROSURGICAL W/ FREE FLAP;  Surgeon: Mailka Napier MD;  [facility];  Service: Plastics    IN MUSCLE-SKIN FLAP,TRUNK Right 4/13/2021    Procedure: RECONSTRUCTION MICROSURGICAL W/ FREE FLAP, RE EXPLORATION, MICRO VASCULAR REVISION;  Surgeon: Malika Napier MD;  [facility];  Service: Plastics    IN PART [address] [facility] BONE METATARSAL HEAD,EA Right 12/29/2020    Procedure: EXCISION EXOSTOSIS;  Surgeon: Rosetta Young DPM;  [facility];  Service: Vijaya Santana SURG WND EXTEN/COMPLIC Right 46/25/6049    Procedure: PRIMARY DELAYED CLOSURE;  Surgeon: Rosetta Young DPM;  [facility];  Service: Podiatry    TOE AMPUTATION Right     partial great toe    TONSILLECTOMY      VAC DRESSING APPLICATION Right 2/8/0275    Procedure: APPLICATION VAC DRESSING EXTREMITY;  Surgeon: Malika Napier MD;  [facility];  Service: Plastics    [facility] [place] DEBRIDEMENT Right 3/1/2021    Procedure: DEBRIDEMENT LOWER EXTREMITY ([facility]me[facility] OUT);   Surgeon: Malika Napier MD; [facility];  Service: Plastics    WOUND DEBRIDEMENT Right 2021    Procedure: DEBRIDEMENT RIGHT FOOT;  Surgeon: Dale Palma MD;  [facility];  Service: Plastics    WOUND DEBRIDEMENT Right 2021    Procedure: DEBRIDEMENT LOWER EXTREMITY [facility] OUT); Surgeon: Dale Palma MD;  [facility];  Service: Plastics    WOUND DEBRIDEMENT Right 2021    Procedure: DEBRIDEMENT LOWER EXTREMITY [facility] OUT): RIGHT FOOT;  Surgeon: Dale Palma MD;  [facility];  Service: Plastics     Family History   Problem Relation Age of Onset    Diabetes Father         mellitus     Social History     Socioeconomic History    Marital status: /Civil Union     Spouse name: Not on file    Number of children: Not on file    Years of education: Not on file    Highest education level: Not on file   Occupational History    Not on file   Tobacco Use    Smoking status: Former Smoker     Types: Cigarettes     Quit date: 1988     Years since quittin 7    Smokeless tobacco: Former User    Tobacco comment: exposure to passive smoke   Vaping Use    Vaping Use: Never used   Substance and Sexual Activity    Alcohol use: Not Currently     Comment: socially    Drug use: Not Currently    Sexual activity: Yes     Partners: Female   Other Topics Concern    Not on file   Social History Narrative    Not on file     Social Determinants of Health     Financial Resource Strain:     Difficulty of Paying Living Expenses:    Food Insecurity:     Worried About Running Out of Food in the Last Year:     [address] in the Last Year:    Transportation Needs:     Lack of Transportation (Medical):      Lack of Transportation (Non-Medical):    Physical Activity:     Days of Exercise per Week:     Minutes of Exercise per Session:    Stress:     Feeling of Stress :    Social Connections:     Frequency of Communication with Friends and Family:     Frequency of Social Gatherings with Friends and Family:     Attends Rastafari Services:     Active Member of Clubs or Organizations:     Attends Club or Organization Meetings:     Marital Status:    Intimate Partner Violence:     Fear of Current or Ex-Partner:     Emotionally Abused:     Physically Abused:     Sexually Abused:        Objective:     Vitals:    21 1512   BP: 138/92   Pulse: 77   SpO2: 98%     Wt Readings from Last 3 Encounters:   21 106 kg (233 lb)   21 97 5 kg (215 lb)   21 96 8 kg (213 lb 6 4 oz)     Pulse Readings from Last 3 Encounters:   21 77   21 78   21 72     BP Readings from Last 3 Encounters:   21 138/92   21 (!) 187/94   21 (!) 181/90       Physical Exam  Constitutional:       Appearance: Normal appearance  HENT:      Head: Normocephalic  Cardiovascular:      Rate and Rhythm: Rhythm irregular  Heart sounds: S1 normal and S2 normal  Murmur heard  Crescendo systolic murmur is present with a grade of 2/6  Comments: Right TMA amputation with wound  Musculoskeletal:      Right lower le+ Pitting Edema present  Left lower le+ Pitting Edema present           Pertinent Laboratory/Diagnostic Studies:    Laboratory studies reviewed personally by Chas Bellamy MD    BMP:   Lab Results   Component Value Date    SODIUM 143 2021    K 4 2 2021     (H) 2021    CO2 27 2021    BUN 18 2021    CREATININE 0 80 2021    GLUC 130 2021    CALCIUM 9 6 2021     CBC:  Lab Results   Component Value Date    WBC 8 36 2021    WBC 7 4 2018    RBC 4 17 2021    RBC 4 64 2018    HGB 12 4 2021    HGB 14 5 2018    HCT 40 6 2021    HCT 44 1 2018    MCV 97 2021    MCV 95 2018    MCH 29 7 2021    MCH 31 3 2018    RDW 15 0 2021    RDW 14 3 2018     2021     2018     Coags: Lipid Profile:   No results found for: CHOL  Lab Results   Component Value Date    HDL 38 (L) 2018     Lab Results   Component Value Date    LDLCALC 72 2018     Lab Results   Component Value Date    TRIG 148 2018      Lab Results   Component Value Date    [id number] 1 350 2019     Lab Results   Component Value Date    ALT 59 2021    AST 21 2021       Imaging Studies:   Echo complete with contrast if indicated    Result Date: 2021  Narrative: [address] [phone] Transthoracic Echocardiogram 2D, M-mode, Doppler, and Color Doppler Study date:  2021 Patient: Brenda Acosta MR number: [id number] Account number: [de-identified] : 1950 Age: 73 years Gender: Male Status: Outpatient Location: [address] [place] [address] Height: 76 in Weight: 233 lb BP: 170/ 90 mmHg Indications: HTN;HLD;CP  Diagnoses: E78 5 - Hyperlipidemia, unspecified, I11 9 - Hypertensive heart disease without heart failure, R07 9 - Chest pain, unspecified Sonographer:  JULIAN Watts Primary Physician:  Dani Melton MD Referring Physician:  KIRIT Perez Group:  [facility] Interpreting Physician:  Faizan Huerta MD SUMMARY LEFT VENTRICLE: Systolic function was normal by visual assessment  Ejection fraction was estimated to be 55 %  There were no regional wall motion abnormalities  Wall thickness was mildly increased  LEFT ATRIUM: The atrium was mildly dilated  RIGHT ATRIUM: The atrium was mildly dilated  MITRAL VALVE: There was mild regurgitation  TRICUSPID VALVE: There was mild regurgitation  PULMONIC VALVE: There was trace regurgitation  HISTORY: PRIOR HISTORY: A-fib with RVR;HLD;HTN;DM2  PROCEDURE: The study was performed in the [address]  This was a routine study  The transthoracic approach was used   The study included complete 2D imaging, M-mode, complete spectral Doppler, and color Doppler  The heart rate was 71 bpm, at the start of the study  Images were obtained from the parasternal, apical, subcostal, and suprasternal notch acoustic windows  Image quality was adequate  LEFT VENTRICLE: Size was normal  Systolic function was normal by visual assessment  Ejection fraction was estimated to be 55 %  There were no regional wall motion abnormalities  Wall thickness was mildly increased  DOPPLER: Transmitral flow pattern: atrial fibrillation  RIGHT VENTRICLE: The size was normal  Systolic function was normal  DOPPLER: Systolic pressure was not estimated  LEFT ATRIUM: The atrium was mildly dilated  RIGHT ATRIUM: The atrium was mildly dilated  MITRAL VALVE: Valve structure was normal  There was normal leaflet separation  DOPPLER: The transmitral velocity was within the normal range  There was no evidence for stenosis  There was mild regurgitation  AORTIC VALVE: The valve was trileaflet  Leaflets exhibited normal thickness and normal cuspal separation  DOPPLER: Transaortic velocity was within the normal range  There was no evidence for stenosis  There was no regurgitation  TRICUSPID VALVE: The valve structure was normal  There was normal leaflet separation  DOPPLER: The transtricuspid velocity was within the normal range  There was no evidence for stenosis  There was mild regurgitation  PULMONIC VALVE: Leaflets exhibited normal thickness, no calcification, and normal cuspal separation  DOPPLER: The transpulmonic velocity was within the normal range  There was trace regurgitation  PERICARDIUM: There was no pericardial effusion  AORTA: The root exhibited normal size  SYSTEMIC VEINS: IVC: The inferior vena cava was normal in size and course   Respirophasic changes were normal  SYSTEM MEASUREMENT TABLES 2D %FS: 28 7 % Ao Diam: 3 72 cm EDV(Teich): 156 61 ml EF(Teich): 54 61 % ESV(Teich): 71 08 ml IVSd: 1 12 cm LA Area: 24 91 cm2 LA Diam: 3 82 cm LVEDV MOD A4C: 116 74 ml LVEF MOD A4C: 59 38 % LVESV MOD A4C: 47 42 ml LVIDd: 5 65 cm LVIDs: 4 03 cm LVLd A4C: 8 62 cm LVLs A4C: 6 9 cm LVPWd: 1 12 cm RA Area: 17 08 cm2 RVIDd: 3 4 cm SV MOD A4C: 69 32 ml SV(Teich): 85 53 ml CW AR Dec [place]: 1 78 m/s2 AR Dec Time: 1460 73 ms AR PHT: 423 61 ms AR Vmax: 2 59 m/s AR maxP 9 mmHg AV Vmax: 1 63 m/s AV maxPG: 10 58 mmHg PRend P 23 mmHg PRend Vmax: 1 25 m/s TR Vmax: 2 14 m/s TR maxP 33 mmHg MM TAPSE: 2 31 cm PW E' Sept: 0 12 m/s E/E' Sept: 6 89 MV A Jerry: 0 4 m/s MV Dec [place]: 3 67 m/s2 MV DecT: 218 57 ms MV E Jerry: 0 8 m/s MV E/A Ratio: 1 99 MV PHT: 63 39 ms MVA By PHT: 3 47 cm2 IntersLehigh Valley Health Networketal Commission Accredited Echocardiography Laboratory Prepared and electronically signed by Tim Nguyen MD Signed 2021 11:17:23     Stress strip    Result Date: 2021  Narrative: Confirmed by Raza Alejandra ([id number]),  [address] [place], [place] [address] ([id number]) on 2021 3:04:23 PM    NM myocardial perfusion spect (rx stress and/or rest)    Result Date: 2021  Narrative: [place] [address] [place] [address], [address] [place] [address] [phone] Stress Gated SPECT Myocardial Perfusion Imaging after Regadenoson Patient: Grace Yap MR number: [id number] Account number: [de-identified] : 1950 Age: 73 years Gender: Male Status: Outpatient Location: [address] [place] [address] [facility] Height: 76 in Weight: 233 lb BP: 136/ 72 mmHg Allergies: SIMVASTATIN, ITRACONAZOLE Diagnosis: E78 5 - Hyperlipidemia, unspecified, I10  - Essential (primary) hypertension, R07 9 - Chest pain, unspecified Interpreting Physician:  Tim Nguyen MD Referring Physician:  KIRIT Joseph Primary Physician:  Raegan Ramirez MD Technician:  Radha Wan RN:  Neeraj Alberts RN Group:  [facility] Report Prepared by[de-identified]  Neeraj Alberts RN INDICATIONS: Detection for coronary artery disease   HISTORY: The patient is a 73year old  male in a wheelchair Right foot wound s/p transmetatarsal amputation with drainage vac attached Chest pain status: recent onset chest pain  Coronary artery disease risk factors: dyslipidemia, hypertension, former smoking, and diabetes mellitus  Cardiovascular history: arrhythmia (atrial fibrillation) and transient ischemic attack  Medications: a beta blocker, a calcium channel blocker, an ACE inhibitor, aspirin, an anticoagulant,diabetic medications and a lipid lowering agent  PHYSICAL EXAM: Baseline physical exam screening: no wheezes audible  REST ECG: Atrial fibrillation  PROCEDURE: The study was performed in the the [address] [facility]  A sitting regadenoson infusion pharmacologic stress test was performed  Gated SPECT myocardial perfusion imaging was performed during stress  Systolic blood pressure was 136 mmHg, at the start of the study  Diastolic blood pressure was 72 mmHg, at the start of the study  The heart rate was 50 bpm, at the start of the study  IV double checked  Regadenoson protocol: HR bpm SBP mmHg DBP mmHg Symptoms Baseline 50 136 72 none Immediate 71 132 70 mild dyspnea 1 min 70 138 74 subsiding STRESS SUMMARY: Duration of pharmacologic stress was 3 min  The heart rate response to stress was normal  There was normal resting blood pressure with an appropriate response to stress  There was no chest pain during stress  The stress test was terminated due to protocol completion  Pre oxygen saturation: 99 %  Peak oxygen saturation: 98 %  The stress ECG was equivocal for ischemia  Arrhythmia during stress: isolated premature ventricular beats  ISOTOPE ADMINISTRATION: Resting isotope administration Stress isotope administration Agent Tetrofosmin Tetrofosmin Dose 10 74 mCi 32 7 mCi Date 07/22/2021 07/22/2021 Injection-image interval 50 min 53 min The radiopharmaceutical was injected at the peak effect of pharmacologic stress  MYOCARDIAL PERFUSION IMAGING: The image quality was good  Left ventricular size was normal  The TID ratio was 1 04  PERFUSION DEFECTS: -  There was a small, moderately severe, fixed myocardial perfusion defect of the inferoseptal wall  GATED SPECT: The calculated left ventricular ejection fraction was 50 %  Left ventricular ejection fraction was within normal limits by visual estimate  There was no left ventricular regional abnormality  SUMMARY: -  Stress results: There was no chest pain during stress  -  ECG conclusions: The stress ECG was equivocal for ischemia  -  Perfusion imaging: There was a small, moderately severe, fixed myocardial perfusion defect of the inferoseptal wall  -  Gated SPECT: The calculated left ventricular ejection fraction was 50 %  Left ventricular ejection fraction was within normal limits by visual estimate  There was no left ventricular regional abnormality  IMPRESSIONS: Abnormal study after pharmacologic stress without reproduction of symptoms  There was a small infarct in the inferoseptal region  Left ventricular systolic function was normal  Prepared and signed by Tim Nguyen MD Signed 2021 16:10:59       Cardiac testing:   EKG reviewed personally: atrial fibrillation, rate 77  Results for orders placed during the hospital encounter of 21    Echo complete with contrast if indicated    Narrative  [address], [address] [facility]  [phone]    Transthoracic Echocardiogram  2D, M-mode, Doppler, and Color Doppler    Study date:  2021    Patient: Grace Yap  MR number: [id number]  Account number: [de-identified]  : 1950  Age: 73 years  Gender: Male  Status: Outpatient  Location: [address] [place] [address] [facility] Center  Height: 76 in  Weight: 233 lb  BP: 170/ 90 mmHg    Indications: HTN;HLD;CP      Diagnoses: E78 5 - Hyperlipidemia, unspecified, I11 9 - Hypertensive heart disease without heart failure, R07 9 - Chest pain, unspecified    Sonographer:  JULIAN Vargas  Primary Physician:  Raegan Ramirez MD  Referring Physician:  Bartolome Penn Harinder Sanchez:  [facility]  Interpreting Physician:  Dori Mata MD    SUMMARY    LEFT VENTRICLE:  Systolic function was normal by visual assessment  Ejection fraction was estimated to be 55 %  There were no regional wall motion abnormalities  Wall thickness was mildly increased  LEFT ATRIUM:  The atrium was mildly dilated  RIGHT ATRIUM:  The atrium was mildly dilated  MITRAL VALVE:  There was mild regurgitation  TRICUSPID VALVE:  There was mild regurgitation  PULMONIC VALVE:  There was trace regurgitation  HISTORY: PRIOR HISTORY: A-fib with RVR;HLD;HTN;DM2     PROCEDURE: The study was performed in the [address] [facility] [place]  This was a routine study  The transthoracic approach was used  The study included complete 2D imaging, M-mode, complete spectral Doppler, and color Doppler  The  heart rate was 71 bpm, at the start of the study  Images were obtained from the parasternal, apical, subcostal, and suprasternal notch acoustic windows  Image quality was adequate  LEFT VENTRICLE: Size was normal  Systolic function was normal by visual assessment  Ejection fraction was estimated to be 55 %  There were no regional wall motion abnormalities  Wall thickness was mildly increased  DOPPLER: Transmitral  flow pattern: atrial fibrillation  RIGHT VENTRICLE: The size was normal  Systolic function was normal  DOPPLER: Systolic pressure was not estimated  LEFT ATRIUM: The atrium was mildly dilated  RIGHT ATRIUM: The atrium was mildly dilated  MITRAL VALVE: Valve structure was normal  There was normal leaflet separation  DOPPLER: The transmitral velocity was within the normal range  There was no evidence for stenosis  There was mild regurgitation  AORTIC VALVE: The valve was trileaflet  Leaflets exhibited normal thickness and normal cuspal separation  DOPPLER: Transaortic velocity was within the normal range  There was no evidence for stenosis   There was no regurgitation  TRICUSPID VALVE: The valve structure was normal  There was normal leaflet separation  DOPPLER: The transtricuspid velocity was within the normal range  There was no evidence for stenosis  There was mild regurgitation  PULMONIC VALVE: Leaflets exhibited normal thickness, no calcification, and normal cuspal separation  DOPPLER: The transpulmonic velocity was within the normal range  There was trace regurgitation  PERICARDIUM: There was no pericardial effusion  AORTA: The root exhibited normal size  SYSTEMIC VEINS: IVC: The inferior vena cava was normal in size and course  Respirophasic changes were normal     SYSTEM MEASUREMENT TABLES    2D  %FS: 28 7 %  Ao Diam: 3 72 cm  EDV(Teich): 156 61 ml  EF(Teich): 54 61 %  ESV(Teich): 71 08 ml  IVSd: 1 12 cm  LA Area: 24 91 cm2  LA Diam: 3 82 cm  LVEDV MOD A4C: 116 74 ml  LVEF MOD A4C: 59 38 %  LVESV MOD A4C: 47 42 ml  LVIDd: 5 65 cm  LVIDs: 4 03 cm  LVLd A4C: 8 62 cm  LVLs A4C: 6 9 cm  LVPWd: 1 12 cm  RA Area: 17 08 cm2  RVIDd: 3 4 cm  SV MOD A4C: 69 32 ml  SV(Teich): 85 53 ml    CW  AR Dec Dickson: 1 78 m/s2  AR Dec Time: 1460 73 ms  AR PHT: 423 61 ms  AR Vmax: 2 59 m/s  AR maxP 9 mmHg  AV Vmax: 1 63 m/s  AV maxPG: 10 58 mmHg  PRend P 23 mmHg  PRend Vmax: 1 25 m/s  TR Vmax: 2 14 m/s  TR maxP 33 mmHg    MM  TAPSE: 2 31 cm    PW  E' Sept: 0 12 m/s  E/E' Sept: 6 89  MV A Jerry: 0 4 m/s  MV Dec Dickson: 3 67 m/s2  MV DecT: 218 57 ms  MV E Jerry: 0 8 m/s  MV E/A Ratio: 1 99  MV PHT: 63 39 ms  MVA By PHT: 3 47 cm2    [employer] Accredited Ech[facility]    Prepared and electronically signed by    Marcia Ballard MD  Signed 2021 11:17:23        Orders Placed This Encounter   Procedures    POCT ECG           Panchito Guerrero MD, [facility]    Portions of the record may have been created with voice recognition software    Occasional wrong word or "sound a like" substitutions may have occurred due to the inherent limitations of voice recognition software  Read the chart carefully and recognize, using context, where substitutions have occurred

## 2021-08-09 NOTE — ASSESSMENT & PLAN NOTE
Stop aspirin  Continue Eliquis to lower stroke risk  Continue diltiazem and metoprolol for rate control  Currently he is mildly symptomatic from the atrial fibrillation but the ventricular rate is fairly well controlled  Defer further invasive strategies for now  Continue rate control

## 2021-08-09 NOTE — ASSESSMENT & PLAN NOTE
BP Readings from Last 3 Encounters:   08/09/21 138/92   08/06/21 (!) 187/94   07/30/21 (!) 181/90       BP is now better controlled  Continue current medications  Lifestyle modification including increased physical activity, low-salt diet rich in fruits and vegetables, avoidance of alcohol intake and maintaining healthy weight

## 2021-08-12 DIAGNOSIS — E11.40 TYPE 2 DIABETES MELLITUS WITH DIABETIC NEUROPATHY, WITHOUT LONG-TERM CURRENT USE OF INSULIN (HCC): Chronic | ICD-10-CM

## 2021-08-12 NOTE — ED NOTES
SOD at bedside     Charlie Pelayo RN  11/06/18 2011 [FreeTextEntry1] : [facility] F/U  Complicated Sigmoid Diverticulitis\par 63 female with acute perforated sigmoid diverticulitis ADMMather 7/27/21\par S/P Right Transgluteal catheter drainage 7/27\par S/P Sigmoid colectomy, end colostomy, and drainage of abscess 7/29\par Pt was treated with a course of IVAB from 7/27 to 8/9 with Zosyn/ Invanz\par On 8/8 pt de developed a Left PICC  line associated DVT and had her IV removed ans was started on Eliquis\par AB changed   on 8/9 to oral Levaquin and Keflex\par \par No fever, diarrhea\par Pt eagerly awaiting reversal of colostomy in 6 mos

## 2021-08-14 RX ORDER — GLIMEPIRIDE 2 MG/1
2 TABLET ORAL 2 TIMES DAILY
Qty: 60 TABLET | Refills: 0 | Status: SHIPPED | OUTPATIENT
Start: 2021-08-14 | End: 2021-10-21 | Stop reason: SDUPTHER

## 2021-08-16 DIAGNOSIS — Z47.81 AFTERCARE FOR AMPUTATION STUMP: Primary | ICD-10-CM

## 2021-08-16 RX ORDER — CEPHALEXIN 500 MG/1
500 CAPSULE ORAL EVERY 6 HOURS SCHEDULED
Qty: 28 CAPSULE | Refills: 0 | Status: SHIPPED | OUTPATIENT
Start: 2021-08-16 | End: 2021-08-23

## 2021-08-18 ENCOUNTER — TELEPHONE (OUTPATIENT)
Dept: WOUND CARE | Facility: HOSPITAL | Age: 71
End: 2021-08-18

## 2021-08-18 NOTE — TELEPHONE ENCOUNTER
Call received from Alyssa You with [facility], reporting patient started antibiotic on Monday evening, noting drainage is same color, consistency, and amount  Wound redness is same  Denies any warmth to touch  Does reports patient verbalizing a "weird feeling" where the great toe would be--such as discomfort, sharp pain

## 2021-08-27 ENCOUNTER — OFFICE VISIT (OUTPATIENT)
Dept: WOUND CARE | Facility: HOSPITAL | Age: 71
End: 2021-08-27
Payer: COMMERCIAL

## 2021-08-27 VITALS
HEART RATE: 83 BPM | DIASTOLIC BLOOD PRESSURE: 91 MMHG | TEMPERATURE: 98.2 F | RESPIRATION RATE: 16 BRPM | SYSTOLIC BLOOD PRESSURE: 182 MMHG

## 2021-08-27 DIAGNOSIS — L97.412 DIABETIC ULCER OF RIGHT MIDFOOT ASSOCIATED WITH TYPE 2 DIABETES MELLITUS, WITH FAT LAYER EXPOSED (HCC): Primary | ICD-10-CM

## 2021-08-27 DIAGNOSIS — E11.621 DIABETIC ULCER OF RIGHT MIDFOOT ASSOCIATED WITH TYPE 2 DIABETES MELLITUS, WITH FAT LAYER EXPOSED (HCC): Primary | ICD-10-CM

## 2021-08-27 PROCEDURE — 11044 DBRDMT BONE 1ST 20 SQ CM/<: CPT | Performed by: PODIATRIST

## 2021-08-27 NOTE — PROGRESS NOTES
Patient ID: Margaret Jarrett  is a 73 y o  male Date of Birth 1950     My role is Foot, Ankle and Wound Specialist    Chief Complaint   Patient presents with    Follow Up Wound Care Visit     Right foot       DFU    Subjective:   Emiliano Rendon is here  after delayed primary closure of wound  He finished his course of oral antibiotics and notices less redness, drainage from the site  He is having minimal sporadic drainage from his anterior Left thigh SPTSG donor site  He denies pain, fevers         The following portions of the patient's history were reviewed and updated as appropriate:   Patient Active Problem List   Diagnosis    Hyperlipidemia    Type 2 diabetes mellitus with diabetic neuropathy ([address] )    Atrial fibrillation with RVR ([facility] [address]    Right middle lobe pulmonary nodule    Essential hypertension    Acquired absence of other right toe(s) ([address] )    Benign prostatic hyperplasia with lower urinary tract symptoms    Positive for microalbuminuria    Deep disruption or dehiscence of operation wound    Post-operative state    Aftercare for amputation stump    Ambulatory dysfunction    S/P transmetatarsal amputation of foot, right (HCC)    Sleep disturbance    Acute osteomyelitis of right ankle or foot ([address] )    Anxiety    S/P R ALT flap to right foot     Past Medical History:   Diagnosis Date    Arthritis     Atrial fibrillation ([address] )     Diagnosed 11/2018    Benign prostate hyperplasia 04/2002    Cellulitis     right lower leg     Colon polyp 2006    Diabetes mellitus ([address] )     Hearing aid worn     bilat    Hearing loss     90% loss left ear and 40% right ear    History of clubfoot     "since birth"    History of pneumonia     History of TIA (transient ischemic attack) 04/25/2017 11/30/18 pt denies    Hyperlipidemia 5/15/2014    Hypertension     Infectious viral hepatitis     "cant remember type"    Irregular heart beat     Neuropathy     both feet    Osteomyelitis ([address] )     right great toe    Right middle lobe pulmonary nodule 11/6/2018    Seasickness     Teeth missing     Type 2 diabetes mellitus with diabetic neuropathy ([facility] [address] ) 11/6/2018    Wears glasses     reading    Wound, open     bottom of right foot     Past Surgical History:   Procedure Laterality Date    BUNIONECTOMY Right 12/4/2018    Procedure: 5TH METATARSAL BONE PARTIAL RESECTION, FULL THICKNESS DEBRIDEMENT OF DIABETIC ULCER;  Surgeon: Ever Yadav DPM;  [facility];  Service: Podiatry    CLUB FOOT RELEASE Bilateral     COLONOSCOPY      COMPLEX WOUND CLOSURE TO EXTREMITY  4/27/2021    Procedure: COMPLEX WOUND CLOSURE TO EXTREMITY: RIGHT FOOT;  Surgeon: Annabelle Montiel MD;  [facility];  Service: Plastics    EXTERNAL FIXATOR APPLICATION Right 8/78/3652    Procedure: Application multiplane external fixation;  Surgeon: Gracia Arreguin DPM;  [facility];  Service: Podiatry    FOOT HARDWARE REMOVAL Right 2/17/2021    Procedure: REMOVAL EXTERNAL FIXATOR;  Surgeon: Gracia Arreguin DPM;  [facility];  Service: Podiatry    FOREIGN BODY REMOVAL Right 4/27/2021    Procedure: REMOVAL FOREIGN BODY EXTREMITY: RIGHT FOOT;  Surgeon: Annabelle Montiel MD;  [facility];  Service: Plastics    INCISION AND DRAINAGE OF WOUND Right 2/17/2021    Procedure: INCISION AND DRAINAGE (I&D) EXTREMITY;  Surgeon: Gracia Arreguin DPM;  [facility];  Service: Podiatry    ORIF FOOT FRACTURE Right 3/4/2021    Procedure: VAC PLACEMENT; SCREW FIXATION RIGHT FOOT FUSION;  Surgeon: Gracia Arreguin DPM;  [facility];  Service: Podiatry    [facility] Right 2/22/2021    Procedure: AMPUTATION TRANSMETATARSAL (TMA), REMOVAL NAIL IM T2 ICF HARDWARE;  Surgeon: Gracia Arreguin DPM;  [facility];  Service: Podiatry    [place] FUSION FOOT BONES,MIDTARSAL,OSTEOTMY Right 2/12/2021    Procedure: foot ARTHRODESIS/FUSION;  Surgeon: Gracia Arreguin DPM;  [facility];  Service: Podiatry    VA LENGTH/SHORT LEG/ANKL TENDON,SINGLE Right 2/12/2021    Procedure: LENGTHEN TIBIAL TENDON, TRANS HEEL CORD;  Surgeon: Fang Hernández DPM;  [facility];  Service: Podiatry    VA MUSCLE-SKIN FLAP,TRUNK Right 4/12/2021    Procedure: RECONSTRUCTION MICROSURGICAL W/ FREE FLAP;  Surgeon: Yousuf Pope MD;  [facility];  Service: Plastics    VA MUSCLE-SKIN FLAP,TRUNK Right 4/12/2021    Procedure: TAKEBACK RECONSTRUCTION MICROSURGICAL W/ FREE FLAP;  Surgeon: Yousuf Pope MD;  [facility];  Service: Plastics    VA MUSCLE-SKIN FLAP,TRUNK Right 4/13/2021    Procedure: RECONSTRUCTION MICROSURGICAL W/ FREE FLAP, RE EXPLORATION, MICRO VASCULAR REVISION;  Surgeon: Yousuf Pope MD;  [facility];  Service: Plastics    VA PART [address] BONE METATARSAL HEAD,EA Right 12/29/2020    Procedure: EXCISION EXOSTOSIS;  Surgeon: Fang Hernández DPM;  [facility];  Service: Higinio Kwon SURG WND EXTEN/COMPLIC Right 60/68/7121    Procedure: PRIMARY DELAYED CLOSURE;  Surgeon: Fang Hernández DPM;  [facility];  Service: Podiatry    TOE AMPUTATION Right     partial great toe    TONSILLECTOMY      VAC DRESSING APPLICATION Right 6/2/7670    Procedure: APPLICATION VAC DRESSING EXTREMITY;  Surgeon: Yousuf Pope MD;  [facility];  Service: Plastics    [address] [facility] Right 3/1/2021    Procedure: DEBRIDEMENT LOWER EXTREMITY ([address] OUT); Surgeon: Yousuf Pope MD;  [facility];  Service: Plastics    WOUND DEBRIDEMENT Right 4/7/2021    Procedure: DEBRIDEMENT RIGHT FOOT;  Surgeon: Yousuf Pope MD;  [facility];  Service: Plastics    WOUND DEBRIDEMENT Right 4/12/2021    Procedure: DEBRIDEMENT LOWER EXTREMITY [facility] OUT);   Surgeon: Yousuf Pope MD;  [facility];  Service: Plastics    WOUND DEBRIDEMENT Right 2021    Procedure: DEBRIDEMENT LOWER EXTREMITY [facility] OUT): RIGHT FOOT;  Surgeon: Yousuf Pope MD;  [facility];  Service: Plastics     Social History     Socioeconomic History    Marital status: /Civil Union     Spouse name: None    Number of children: None    Years of education: None    Highest education level: None   Occupational History    None   Tobacco Use    Smoking status: Former Smoker     Types: Cigarettes     Quit date: 1988     Years since quittin 8    Smokeless tobacco: Former User    Tobacco comment: exposure to passive smoke   Vaping Use    Vaping Use: Never used   Substance and Sexual Activity    Alcohol use: Not Currently     Comment: socially    Drug use: Not Currently    Sexual activity: Yes     Partners: Female   Other Topics Concern    None   Social History Narrative    None     Social Determinants of Health     Financial Resource Strain:     Difficulty of Paying Living Expenses:    Food Insecurity:     Worried About Running Out of Food in the Last Year:     Ran Out of Food in the Last Year:    Transportation Needs:     Lack of Transportation (Medical):      Lack of Transportation (Non-Medical):    Physical Activity:     Days of Exercise per Week:     Minutes of Exercise per Session:    Stress:     Feeling of Stress :    Social Connections:     Frequency of Communication with Friends and Family:     Frequency of Social Gatherings with Friends and Family:     Attends [employer] Services:     Active Member of Clubs or Organizations:     Attends Club or Organization Meetings:     Marital Status:    Intimate Partner Violence:     Fear of Current or Ex-Partner:     Emotionally Abused:     Physically Abused:     Sexually Abused:         Current Outpatient Medications:     acetaminophen (Mapap Arthritis Pain) 650 mg CR tablet, Take 1 tablet (650 mg total) by mouth 2 (two) times a day, Disp: 60 tablet, Rfl: 1    atorvastatin (LIPITOR) 40 mg tablet, Take 1 tablet (40 mg total) by mouth daily, Disp: 90 tablet, Rfl: 3    Blood Glucose Monitoring Suppl (ACCU-CHEK JYOTI SMARTVIEW) w/Device KIT, use to check Blood Sugar as needed, Disp: , Rfl:     diltiazem (CARDIZEM) 90 mg tablet, Take 1 tablet (90 mg total) by mouth 2 (two) times a day, Disp: 60 tablet, Rfl: 0    docusate sodium (COLACE) 100 mg capsule, Take 1 capsule (100 mg total) by mouth 2 (two) times a day, Disp: 10 capsule, Rfl: 0    Eliquis 5 MG, TAKE 1 TABLET BY MOUTH TWICE A DAY, Disp: 60 tablet, Rfl: 12    glimepiride (AMARYL) 2 mg tablet, TAKE 1 TABLET (2 MG TOTAL) BY MOUTH 2 (TWO) TIMES A DAY, Disp: 60 tablet, Rfl: 0    glucose blood (Accu-Chek SmartView) test strip, Use as instructed BID, Disp: 200 each, Rfl: 6    Januvia 100 MG tablet, Take 1 tablet (100 mg total) by mouth daily, Disp: 132 tablet, Rfl: 3    lisinopril (ZESTRIL) 40 mg tablet, Take 1 tablet (40 mg total) by mouth daily, Disp: 90 tablet, Rfl: 3    metFORMIN (GLUCOPHAGE) 500 mg tablet, Take 1 tablet (500 mg total) by mouth 2 (two) times a day with meals, Disp: 180 tablet, Rfl: 6    metoprolol succinate (TOPROL-XL) 25 mg 24 hr tablet, Take (3) Tablets daily by mouth PO BID, Disp: 60 tablet, Rfl: 12  Family History   Problem Relation Age of Onset    Diabetes Father         mellitus      Review of Systems   Constitutional: Positive for activity change  Negative for appetite change, chills, diaphoresis, fatigue, fever and unexpected weight change  Skin: Positive for wound  Allergies  Simvastatin and Itraconazole    Objective:  BP (!) 182/91   Pulse 83   Temp 98 2 °F (36 8 °C)   Resp 16     Physical Exam  Vitals and nursing note reviewed  Constitutional:       Appearance: Normal appearance  Neurological:      Mental Status: He is alert             Wound 06/04/21 Diabetic Ulcer Foot Anterior;Right (Active)   Wound Image   08/27/21 1112   Wound Description Exposed bone;Pink;Yellow 08/27/21 8954 Lavern-wound Assessment Maceration;Scar Tissue;Edema 08/27/21 0831   Wound Length (cm) 0 2 cm 08/27/21 0831   Wound Width (cm) 2 7 cm 08/27/21 0831   Wound Depth (cm) 0 4 cm 08/27/21 0831   Wound Surface Area (cm^2) 0 54 cm^2 08/27/21 0831   Wound Volume (cm^3) 0 216 cm^3 08/27/21 0831   Calculated Wound Volume (cm^3) 0 22 cm^3 08/27/21 0831   Change in Wound Size % 99 29 08/27/21 0831   Drainage Amount Moderate 08/27/21 0831   Drainage Description Serosanguineous; Yellow 08/27/21 0831   Non-staged Wound Description Full thickness 08/27/21 0831   Dressing Wound V A C  07/02/21 0847   Dressing Status Intact 08/27/21 0831       Wound 08/06/21 Donor Site Thigh Left; Anterior (Active)   Wound Image   08/27/21 0830   Wound Description Epithelialization 08/27/21 0829   Lavern-wound Assessment Scar Tissue;Fragile 08/06/21 0925   Wound Length (cm) 0 1 cm 08/27/21 0829   Wound Width (cm) 0 1 cm 08/27/21 0829   Wound Depth (cm) 0 1 cm 08/27/21 0829   Wound Surface Area (cm^2) 0 01 cm^2 08/27/21 0829   Wound Volume (cm^3) 0 001 cm^3 08/27/21 0829   Calculated Wound Volume (cm^3) 0 cm^3 08/27/21 0829   Change in Wound Size % 100 08/27/21 0829   Drainage Amount None 08/27/21 0829   Drainage Description Serosanguineous 08/06/21 0925   Non-staged Wound Description Full thickness 08/06/21 0925   Dressing Status Intact 08/27/21 0829           Wound 06/04/21 Diabetic Ulcer Foot Anterior;Right (Active)   Wound Image   08/27/21 0844   Wound Description Exposed bone;Pink;Yellow 08/27/21 0831   Lavern-wound Assessment Maceration;Scar Tissue;Edema 08/27/21 0831   Wound Length (cm) 0 2 cm 08/27/21 0831   Wound Width (cm) 2 7 cm 08/27/21 0831   Wound Depth (cm) 0 4 cm 08/27/21 0831   Wound Surface Area (cm^2) 0 54 cm^2 08/27/21 0831   Wound Volume (cm^3) 0 216 cm^3 08/27/21 0831   Calculated Wound Volume (cm^3) 0 22 cm^3 08/27/21 0831   Change in Wound Size % 99 29 08/27/21 0831   Drainage Amount Moderate 08/27/21 0831   Drainage Description Serosanguineous; Yellow 08/27/21 0831   Non-staged Wound Description Full thickness 08/27/21 0831   Dressing Wound V A C  07/02/21 0847   Dressing Status Intact 08/27/21 0831       Wound 08/06/21 Donor Site Thigh Left; Anterior (Active)   Wound Image   08/27/21 0830   Wound Description Epithelialization 08/27/21 0829   Lavern-wound Assessment Scar Tissue;Fragile 08/06/21 0925   Wound Length (cm) 0 1 cm 08/27/21 0829   Wound Width (cm) 0 1 cm 08/27/21 0829   Wound Depth (cm) 0 1 cm 08/27/21 0829   Wound Surface Area (cm^2) 0 01 cm^2 08/27/21 0829   Wound Volume (cm^3) 0 001 cm^3 08/27/21 0829   Calculated Wound Volume (cm^3) 0 cm^3 08/27/21 0829   Change in Wound Size % 100 08/27/21 0829   Drainage Amount None 08/27/21 0829   Drainage Description Serosanguineous 08/06/21 0925   Non-staged Wound Description Full thickness 08/06/21 0925   Dressing Status Intact 08/27/21 0829                         Diagnosis:  1  Diabetic ulcer of right 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, with fat layer exposed ([address] )  -     Wound cleansing and dressings; Future        Diagnosis ICD-10-CM Associated Orders   1  Diabetic ulcer of right 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, with fat layer exposed ([address] )  E11 621 Wound cleansing and dressings    [id number]         ASSESSMENT    Problems:    Chronic illness / Problem not at goal with exacerbation, progression or side effects of treatment  1  DFU at [facility] stump          PLAN    Discussion of Management /  Recommendations  The primary closure attempt healed 2/3 of the wound  There is still an open area proximally which is very small but I can probe to sharp bone spicules underlying it  I feel this all needs to get debrided out, and needs to be packed every other day with an hydrofiber rope  He may start to walk now in a flat surgical shoe as the wound isn't really in a weightbearing area       I told him to continue the non-stick dressing to his thigh until he sees no drainage for 3 consecutive day  Debridement   Wound 06/04/21 Diabetic Ulcer Foot Anterior;Right    Universal Protocol:  Consent: Verbal consent obtained  Risks and benefits: risks, benefits and alternatives were discussed  Consent given by: patient  Time out: Immediately prior to procedure a "time out" was called to verify the correct patient, procedure, equipment, support staff and site/side marked as required    Timeout called at: 8/27/2021 11:06 AM   Patient understanding: patient states understanding of the procedure being performed  Patient identity confirmed: verbally with patient      Performed by: physician  Debridement type: surgical  Level of debridement: bone  Pain control: lidocaine 4%  Pre-debridement measurements  Length (cm): 0 2  Width (cm): 2 7  Depth (cm): 0 4  Surface Area (cm^2): 0 54  Volume (cm^3): 0 22    Post-debridement measurements  Length (cm): 0 2  Width (cm): 2 7  Depth (cm): 0 6  Percent debrided: 100%  Surface Area (cm^2): 0 54  Area debrided (cm^2): 0 54  Volume (cm^3): 0 32  Tissue and other material debrided: bone  Devitalized tissue debrided: necrotic debris  Instrument(s) utilized: rongeur  Bleeding: small  Hemostasis obtained with: pressure  Procedural pain (0-10): insensate  Post-procedural pain: insensate   Response to treatment: procedure was tolerated well

## 2021-08-27 NOTE — PATIENT INSTRUCTIONS
Orders Placed This Encounter   Procedures    Wound cleansing and dressings     Right foot wound:  Wound care 3x/wk  Do not get wet with shower water  At wound center today:  Sutures removed at today visit   Kevin amin rope into wound  Cover with ABD  Secure with ava and tape      Left Thigh wound  Wash your hands with soap and water  Cleanse the wound with mild soap and water prior to applying a clean dressing  Shower yes   Apply non adhering type dressing to the wound    Cover with gauze and tape  Change dressing daily     Treatments above [facility] at [facility]           Wound off loading      Full weight bearing with wedge shoe   Elevate when seated                Wound home care      Continue with [facility] for wound care     Standing Status:   Future     Standing Expiration Date:   8/27/2022

## 2021-09-01 ENCOUNTER — DOCUMENTATION (OUTPATIENT)
Dept: FAMILY MEDICINE CLINIC | Facility: CLINIC | Age: 71
End: 2021-09-01

## 2021-09-10 ENCOUNTER — OFFICE VISIT (OUTPATIENT)
Dept: WOUND CARE | Facility: HOSPITAL | Age: 71
End: 2021-09-10
Payer: COMMERCIAL

## 2021-09-10 VITALS
HEART RATE: 85 BPM | TEMPERATURE: 97.6 F | DIASTOLIC BLOOD PRESSURE: 104 MMHG | RESPIRATION RATE: 18 BRPM | SYSTOLIC BLOOD PRESSURE: 180 MMHG

## 2021-09-10 DIAGNOSIS — L97.412 DIABETIC ULCER OF RIGHT MIDFOOT ASSOCIATED WITH TYPE 2 DIABETES MELLITUS, WITH FAT LAYER EXPOSED (HCC): Primary | ICD-10-CM

## 2021-09-10 DIAGNOSIS — E11.621 DIABETIC ULCER OF RIGHT MIDFOOT ASSOCIATED WITH TYPE 2 DIABETES MELLITUS, WITH FAT LAYER EXPOSED (HCC): Primary | ICD-10-CM

## 2021-09-10 PROCEDURE — 99024 POSTOP FOLLOW-UP VISIT: CPT | Performed by: PODIATRIST

## 2021-09-10 PROCEDURE — 99213 OFFICE O/P EST LOW 20 MIN: CPT | Performed by: PODIATRIST

## 2021-09-10 PROCEDURE — G0463 HOSPITAL OUTPT CLINIC VISIT: HCPCS | Performed by: PODIATRIST

## 2021-09-10 RX ORDER — LIDOCAINE HYDROCHLORIDE 40 MG/ML
5 SOLUTION TOPICAL ONCE
Status: COMPLETED | OUTPATIENT
Start: 2021-09-10 | End: 2021-09-10

## 2021-09-10 RX ADMIN — LIDOCAINE HYDROCHLORIDE 5 ML: 40 SOLUTION TOPICAL at 09:30

## 2021-09-10 NOTE — PROGRESS NOTES
Patient ID: Sims Alpers  is a 73 y o  male Date of Birth 1950     My role is Foot, Ankle and Wound Specialist    Chief Complaint   Patient presents with    Follow Up Wound Care Visit     right foot and left thigh wounds       DFU    Subjective:   Melida Trinidad is here the residual stump wound  He denies pain, fevers         The following portions of the patient's history were reviewed and updated as appropriate:   Patient Active Problem List   Diagnosis    Hyperlipidemia    Type 2 diabetes mellitus with diabetic neuropathy ([address] )    Atrial fibrillation with RVR ([address] )    Right middle lobe pulmonary nodule    Essential hypertension    Acquired absence of other right toe(s) ([address] )    Benign prostatic hyperplasia with lower urinary tract symptoms    Positive for microalbuminuria    Deep disruption or dehiscence of operation wound    Post-operative state    Aftercare for amputation stump    Ambulatory dysfunction    S/P transmetatarsal amputation of foot, right ([address] )    Sleep disturbance    Acute osteomyelitis of right ankle or foot ([address] )    Anxiety    S/P R ALT flap to right foot     Past Medical History:   Diagnosis Date    Arthritis     Atrial fibrillation ([address] )     Diagnosed 11/2018    Benign prostate hyperplasia 04/2002    Cellulitis     right lower leg     Colon polyp 2006    Diabetes mellitus ([address] )     Hearing aid worn     bilat    Hearing loss     90% loss left ear and 40% right ear    History of clubfoot     "since birth"    History of pneumonia     History of TIA (transient ischemic attack) 04/25/2017 11/30/18 pt denies    Hyperlipidemia 5/15/2014    Hypertension     Infectious viral hepatitis     "cant remember type"    Irregular heart beat     Neuropathy     both feet    Osteomyelitis ([address] )     right great toe    Right middle lobe pulmonary nodule 11/6/2018    Seasickness     Teeth missing     Type 2 diabetes mellitus with diabetic neuropathy ([address] ) 11/6/2018    Wears glasses     reading    Wound, open     bottom of right foot     Past Surgical History:   Procedure Laterality Date    BUNIONECTOMY Right 12/4/2018    Procedure: 5TH METATARSAL BONE PARTIAL RESECTION, FULL THICKNESS DEBRIDEMENT OF DIABETIC ULCER;  Surgeon: Namita Salcedo DPM;  [facility];  Service: Podiatry    CLUB FOOT RELEASE Bilateral     COLONOSCOPY      COMPLEX WOUND CLOSURE TO EXTREMITY  4/27/2021    Procedure: COMPLEX WOUND CLOSURE TO EXTREMITY: RIGHT FOOT;  Surgeon: Tony Anderson MD;  [facility];  Service: Plastics    EXTERNAL FIXATOR APPLICATION Right 9/90/2189    Procedure: Application multiplane external fixation;  Surgeon: Serjio Aguila DPM;  [facility];  Service: Podiatry    FOOT HARDWARE REMOVAL Right 2/17/2021    Procedure: REMOVAL EXTERNAL FIXATOR;  Surgeon: Serjio Aguila DPM;  [facility];  Service: Podiatry    FOREIGN BODY REMOVAL Right 4/27/2021    Procedure: REMOVAL FOREIGN BODY EXTREMITY: RIGHT FOOT;  Surgeon: Tony Anderson MD;  [facility];  Service: Plastics    INCISION AND DRAINAGE OF WOUND Right 2/17/2021    Procedure: INCISION AND DRAINAGE (I&D) EXTREMITY;  Surgeon: Serjio Aguila DPM;  [facility];  Service: Podiatry    ORIF FOOT FRACTURE Right 3/4/2021    Procedure: VAC PLACEMENT; SCREW FIXATION RIGHT FOOT FUSION;  Surgeon: Serjio Aguila DPM;  [facility];  Service: Podiatry    [address] [place] [address] Right 2/22/2021    Procedure: AMPUTATION TRANSMETATARSAL (TMA), REMOVAL NAIL IM T2 ICF HARDWARE;  Surgeon: Serjio Aguila DPM;  [facility];  Service: Podiatry    NC FUSION FOOT BONES,MIDTARSAL,OSTEOTMY Right 2/12/2021    Procedure: foot ARTHRODESIS/FUSION;  Surgeon: Serjio Aguila DPM;  [facility];  Service: Podiatry    NC LENGTH/SHORT LEG/ANKL TENDON,SINGLE Right 2/12/2021    Procedure: LENGTHEN TIBIAL TENDON, TRANS HEEL CORD;  Surgeon: Arnulfo Mchugh Clara Simeon;  [facility];  Service: Podiatry    MS MUSCLE-SKIN FLAP,TRUNK Right 4/12/2021    Procedure: RECONSTRUCTION MICROSURGICAL W/ FREE FLAP;  Surgeon: Indira Charles MD;  [facility];  Service: Plastics    MS MUSCLE-SKIN FLAP,TRUNK Right 4/12/2021    Procedure: TAKEBACK RECONSTRUCTION MICROSURGICAL W/ FREE FLAP;  Surgeon: Indira Charles MD;  [facility];  Service: Plastics    MS MUSCLE-SKIN FLAP,TRUNK Right 4/13/2021    Procedure: RECONSTRUCTION MICROSURGICAL W/ FREE FLAP, RE EXPLORATION, MICRO VASCULAR REVISION;  Surgeon: Indira Charles MD;  [facility];  Service: Plastics    MS PART [address] BONE METATARSAL HEAD,EA Right 12/29/2020    Procedure: EXCISION EXOSTOSIS;  Surgeon: Elan De Leon DPM;  [facility];  Service: [place] SURG WND EXTEN/COMPLIC Right 80/13/5290    Procedure: PRIMARY DELAYED CLOSURE;  Surgeon: Elan De Leon DPM;  [facility];  Service: Podiatry    TOE AMPUTATION Right     partial great toe    TONSILLECTOMY      VAC DRESSING APPLICATION Right 2/0/9969    Procedure: APPLICATION VAC DRESSING EXTREMITY;  Surgeon: Indira Charles MD;  [facility];  Service: Plastics    [facility] Right 3/1/2021    Procedure: DEBRIDEMENT LOWER EXTREMITY ([address] OUT); Surgeon: Indira Charles MD;  [facility];  Service: Plastics    WOUND DEBRIDEMENT Right 4/7/2021    Procedure: DEBRIDEMENT RIGHT FOOT;  Surgeon: Indira Charles MD;  [facility];  Service: Plastics    WOUND DEBRIDEMENT Right 4/12/2021    Procedure: DEBRIDEMENT LOWER EXTREMITY [facility] OUT);   Surgeon: Indira Charles MD;  [facility];  Service: Plastics    WOUND DEBRIDEMENT Right 4/27/2021    Procedure: DEBRIDEMENT LOWER EXTREMITY [facility] OUT): RIGHT FOOT;  Surgeon: Indira Charles MD;  [facility];  Service: Plastics Social History     Socioeconomic History    Marital status: /Civil Union     Spouse name: None    Number of children: None    Years of education: None    Highest education level: None   Occupational History    None   Tobacco Use    Smoking status: Former Smoker     Types: Cigarettes     Quit date: 1988     Years since quittin 8    Smokeless tobacco: Former User    Tobacco comment: exposure to passive smoke   Vaping Use    Vaping Use: Never used   Substance and Sexual Activity    Alcohol use: Not Currently     Comment: socially    Drug use: Not Currently    Sexual activity: Yes     Partners: Female   Other Topics Concern    None   Social History Narrative    None     Social Determinants of Health     Financial Resource Strain:     Difficulty of Paying Living Expenses:    Food Insecurity:     Worried About Running Out of Food in the Last Year:     [address] in the Last Year:    Transportation Needs:     Lack of Transportation (Medical):      Lack of Transportation (Non-Medical):    Physical Activity:     Days of Exercise per Week:     Minutes of Exercise per Session:    Stress:     Feeling of Stress :    Social Connections:     Frequency of Communication with Friends and Family:     Frequency of Social Gatherings with Friends and Family:     Attends Baptism Services:     Active Member of Clubs or Organizations:     Attends Club or Organization Meetings:     Marital Status:    Intimate Partner Violence:     Fear of Current or Ex-Partner:     Emotionally Abused:     Physically Abused:     Sexually Abused:         Current Outpatient Medications:     acetaminophen (Mapap Arthritis Pain) 650 mg CR tablet, Take 1 tablet (650 mg total) by mouth 2 (two) times a day, Disp: 60 tablet, Rfl: 1    atorvastatin (LIPITOR) 40 mg tablet, Take 1 tablet (40 mg total) by mouth daily, Disp: 90 tablet, Rfl: 3    Blood Glucose Monitoring Suppl (ACCU-CHEK JYOTI SMARTVIEW) w/Device KIT, use to check Blood Sugar as needed, Disp: , Rfl:     diltiazem (CARDIZEM) 90 mg tablet, TAKE 1 TABLET (90 MG TOTAL) BY MOUTH 2 (TWO) TIMES A DAY, Disp: 60 tablet, Rfl: 12    docusate sodium (COLACE) 100 mg capsule, Take 1 capsule (100 mg total) by mouth 2 (two) times a day, Disp: 10 capsule, Rfl: 0    Eliquis 5 MG, TAKE 1 TABLET BY MOUTH TWICE A DAY, Disp: 60 tablet, Rfl: 12    glimepiride (AMARYL) 2 mg tablet, TAKE 1 TABLET (2 MG TOTAL) BY MOUTH 2 (TWO) TIMES A DAY, Disp: 60 tablet, Rfl: 0    glucose blood (Accu-Chek SmartView) test strip, Use as instructed BID, Disp: 200 each, Rfl: 6    Januvia 100 MG tablet, Take 1 tablet (100 mg total) by mouth daily, Disp: 132 tablet, Rfl: 3    lisinopril (ZESTRIL) 40 mg tablet, Take 1 tablet (40 mg total) by mouth daily, Disp: 90 tablet, Rfl: 3    metFORMIN (GLUCOPHAGE) 500 mg tablet, Take 1 tablet (500 mg total) by mouth 2 (two) times a day with meals, Disp: 180 tablet, Rfl: 6    metoprolol succinate (TOPROL-XL) 25 mg 24 hr tablet, Take (3) Tablets daily by mouth PO BID, Disp: 60 tablet, Rfl: 12  No current facility-administered medications for this visit  Family History   Problem Relation Age of Onset    Diabetes Father         mellitus      Review of Systems   Constitutional: Positive for activity change  Negative for appetite change, chills, diaphoresis, fatigue, fever and unexpected weight change  Skin: Positive for wound  Allergies  Simvastatin and Itraconazole    Objective:  BP (!) 180/104   Pulse 85   Temp 97 6 °F (36 4 °C)   Resp 18     Physical Exam  Vitals and nursing note reviewed  Constitutional:       Appearance: Normal appearance  Neurological:      Mental Status: He is alert             Wound 06/04/21 Diabetic Ulcer Foot Anterior;Right (Active)   Wound Image   09/10/21 0919   Wound Description Yellow;Pink;Probes to bone 09/10/21 0919   Lavern-wound Assessment Maceration;Scar Tissue;Edema 09/10/21 0919   Wound Length (cm) 0 3 cm 09/10/21 0919   Wound Width (cm) 0 7 cm 09/10/21 0919   Wound Depth (cm) 1 5 cm 09/10/21 0919   Wound Surface Area (cm^2) 0 21 cm^2 09/10/21 0919   Wound Volume (cm^3) 0 315 cm^3 09/10/21 0919   Calculated Wound Volume (cm^3) 0 32 cm^3 09/10/21 0919   Change in Wound Size % 98 97 09/10/21 0919   Drainage Amount Moderate 09/10/21 0919   Drainage Description Serosanguineous; Tan 09/10/21 0919   Non-staged Wound Description Full thickness 09/10/21 0919   Dressing Wound V A C  07/02/21 0847   Dressing Status Intact 08/27/21 0831       Wound 08/06/21 Donor Site Thigh Left; Anterior (Active)   Wound Image   09/10/21 0918   Wound Description Epithelialization;Pink 09/10/21 0928   Lavern-wound Assessment Scar Tissue;Fragile 09/10/21 0928   Wound Length (cm) 1 8 cm 09/10/21 0928   Wound Width (cm) 1 cm 09/10/21 0928   Wound Depth (cm) 0 1 cm 09/10/21 0928   Wound Surface Area (cm^2) 1 8 cm^2 09/10/21 0928   Wound Volume (cm^3) 0 18 cm^3 09/10/21 0928   Calculated Wound Volume (cm^3) 0 18 cm^3 09/10/21 0928   Change in Wound Size % 80 09/10/21 0928   Drainage Amount Scant 09/10/21 2914   Drainage Description Serous; Yellow 09/10/21 0928   Non-staged Wound Description Full thickness 09/10/21 0928   Dressing Status Intact 08/27/21 0829           Wound 06/04/21 Diabetic Ulcer Foot Anterior;Right (Active)   Wound Image   09/10/21 0919   Wound Description Yellow;Pink;Probes to bone 09/10/21 0919   Lavern-wound Assessment Maceration;Scar Tissue;Edema 09/10/21 0919   Wound Length (cm) 0 3 cm 09/10/21 0919   Wound Width (cm) 0 7 cm 09/10/21 0919   Wound Depth (cm) 1 5 cm 09/10/21 0919   Wound Surface Area (cm^2) 0 21 cm^2 09/10/21 0919   Wound Volume (cm^3) 0 315 cm^3 09/10/21 0919   Calculated Wound Volume (cm^3) 0 32 cm^3 09/10/21 0919   Change in Wound Size % 98 97 09/10/21 0919   Drainage Amount Moderate 09/10/21 0919   Drainage Description Serosanguineous; Tan 09/10/21 0919   Non-staged Wound Description Full thickness 09/10/21 0919 Dressing Wound V A C  07/02/21 0847   Dressing Status Intact 08/27/21 0831       Wound 08/06/21 Donor Site Thigh Left; Anterior (Active)   Wound Image   09/10/21 0918   Wound Description Epithelialization;Pink 09/10/21 0928   Lavern-wound Assessment Scar Tissue;Fragile 09/10/21 0928   Wound Length (cm) 1 8 cm 09/10/21 0928   Wound Width (cm) 1 cm 09/10/21 0928   Wound Depth (cm) 0 1 cm 09/10/21 0928   Wound Surface Area (cm^2) 1 8 cm^2 09/10/21 0928   Wound Volume (cm^3) 0 18 cm^3 09/10/21 0928   Calculated Wound Volume (cm^3) 0 18 cm^3 09/10/21 0928   Change in Wound Size % 80 09/10/21 0928   Drainage Amount Scant 09/10/21 2984   Drainage Description Serous; Yellow 09/10/21 0928   Non-staged Wound Description Full thickness 09/10/21 0928   Dressing Status Intact 08/27/21 0829                         Diagnosis:  1  Diabetic ulcer of right 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, with fat layer exposed ([address] )  -     lidocaine (XYLOCAINE) 4 % topical solution 5 mL  -     Wound cleansing and dressings; Future  -     Wound off loading; Future  -     Wound home care; Future  -     MRI foot/forefoot toes right w wo contrast; Future; Expected date: 09/10/2021        Diagnosis ICD-10-CM Associated Orders   1  Diabetic ulcer of right 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, with fat layer exposed ([address] )  E11 621 lidocaine (XYLOCAINE) 4 % topical solution 5 mL    [id number] [id number] Wound cleansing and dressings     Wound off loading     Wound home care     MRI foot/forefoot toes right w wo contrast        ASSESSMENT    Problems:    Chronic illness / Problem not at goal with exacerbation, progression or side effects of treatment  1  DFU   Rule out osteomyelitis      PLAN    Discussion of Management /  Recommendations  I am recommending an MRI of the foot to evaluate for presence / extent of osteomyelitis at the wound base  I feel he may need revisional surgery in OR to excise bone and revise wound / flap

## 2021-09-10 NOTE — PATIENT INSTRUCTIONS
Orders Placed This Encounter   Procedures    Wound cleansing and dressings     Right foot wound:  Wound care 3x/wk  Do not get wet with shower water  Tuck aquacell rope into wound  Cover with ABD  Secure with ava and tape    Left Thigh wound  Wash your hands with soap and water  Cleanse the wound with mild soap and water prior to applying a clean dressing  Shower yes  Apply non adhering type dressing to the wound  Cover with gauze and tape  Change dressing daily      Treatments above were completed today at the [facility]     Standing Status:   Future     Standing Expiration Date:   9/10/2022    Wound off loading     Right foot:  Full weight bearing with wedge shoe  Elevate leg when seated       Standing Status:   Future     Standing Expiration Date:   9/10/2022    Wound home care     Continue with [facility] for wound care     Standing Status:   Future     Standing Expiration Date:   9/10/2022

## 2021-09-13 DIAGNOSIS — E11.40 TYPE 2 DIABETES MELLITUS WITH DIABETIC NEUROPATHY, WITHOUT LONG-TERM CURRENT USE OF INSULIN (HCC): Chronic | ICD-10-CM

## 2021-09-13 NOTE — TELEPHONE ENCOUNTER
Lizette Aguero [facility] RE: verifying metformin dose   Notified Lizette Aguero that as pf 6/2021 should be on BID of 500 mg

## 2021-09-16 ENCOUNTER — CONSULT (OUTPATIENT)
Dept: FAMILY MEDICINE CLINIC | Facility: CLINIC | Age: 71
End: 2021-09-16
Payer: COMMERCIAL

## 2021-09-16 VITALS
RESPIRATION RATE: 17 BRPM | SYSTOLIC BLOOD PRESSURE: 164 MMHG | WEIGHT: 230 LBS | BODY MASS INDEX: 28.01 KG/M2 | OXYGEN SATURATION: 99 % | DIASTOLIC BLOOD PRESSURE: 86 MMHG | HEIGHT: 76 IN | TEMPERATURE: 99.2 F | HEART RATE: 87 BPM

## 2021-09-16 DIAGNOSIS — Z20.828 VIRAL DISEASE EXPOSURE: ICD-10-CM

## 2021-09-16 DIAGNOSIS — H25.9 AGE-RELATED CATARACT OF BOTH EYES, UNSPECIFIED AGE-RELATED CATARACT TYPE: Primary | ICD-10-CM

## 2021-09-16 DIAGNOSIS — Z01.818 PRE-OP EVALUATION: ICD-10-CM

## 2021-09-16 PROCEDURE — 99214 OFFICE O/P EST MOD 30 MIN: CPT | Performed by: NURSE PRACTITIONER

## 2021-09-16 PROCEDURE — 1160F RVW MEDS BY RX/DR IN RCRD: CPT | Performed by: NURSE PRACTITIONER

## 2021-09-16 PROCEDURE — 3008F BODY MASS INDEX DOCD: CPT | Performed by: NURSE PRACTITIONER

## 2021-09-16 PROCEDURE — 1036F TOBACCO NON-USER: CPT | Performed by: NURSE PRACTITIONER

## 2021-09-16 NOTE — PROGRESS NOTES
[facility] PRE-OPERATIVE EVALUATION  [facility] PHYSICIAN GROUP - [facility]    NAME: Margaret Jarrett  AGE: 73 y o  SEX: male  : 1950     DATE: 2021    [facility] Pre-Operative Evaluation      Chief Complaint: Pre-operative Evaluation     Surgery: bilateral cataracts  Anticipated Date of Surgery:  and 10/7? Referring Provider: Dr Benji Shafer      History of Present Illness:     Margaret Jarrett  is a 73 y o  male who presents to the office today for a preoperative consultation at the request of surgeon, Dr Mylene Dueñas, who plans on performing bilateral cataract  on  and 10/7? Robi Miguel Planned anesthesia is local and IV sedation  Patient has a bleeding risk of: no recent abnormal bleeding  Patient does not have objections to receiving blood products if needed  Current anti-platelet/anti-coagulation medications that the patient is prescribed includes: Apixaban (Eliquis)  Assessment of Chronic Conditions:   - Diabetes Mellitus: stable    - Coronary Artery Disease: stable     Assessment of Cardiac Risk:  · Denies unstable or severe angina or MI in the last 6 weeks or history of stent placement in the last year   · Denies decompensated heart failure (e g  New onset heart failure, NYHA functional class IV heart failure, or worsening existing heart failure)  · Denies significant arrhythmias such as high grade AV block, symptomatic ventricular arrhythmia, newly recognized ventricular tachycardia, supraventricular tachycardia with resting heart rate >100, or symptomatic bradycardia  · Denies severe heart valve disease including aortic stenosis or symptomatic mitral stenosis     Exercise Capacity:  · Able to walk 4 blocks without symptoms?: No, limited due to foot   · Able to walk 2 flights without symptoms?: No, limited due to foot     Prior Anesthesia Reactions: No     Personal history of venous thromboembolic disease? No    History of steroid use for >2 weeks within last year? No         Review of Systems:     Review of Systems   Constitutional: Negative for activity change, appetite change, chills and fever  HENT: Negative for ear pain and sore throat  Eyes: Positive for visual disturbance  Negative for photophobia and pain  Respiratory: Negative for cough, chest tightness and shortness of breath  Cardiovascular: Negative for chest pain and palpitations  Gastrointestinal: Negative for abdominal pain, constipation, diarrhea, nausea and vomiting  Genitourinary: Negative for dysuria and hematuria  Musculoskeletal: Positive for gait problem  Negative for arthralgias and back pain  Skin: Negative for color change and rash  Neurological: Negative for seizures and syncope  Psychiatric/Behavioral: Negative for sleep disturbance  The patient is not nervous/anxious  All other systems reviewed and are negative  Current Problem List:     Patient Active Problem List   Diagnosis    Hyperlipidemia    Type 2 diabetes mellitus with diabetic neuropathy ([facility] )    Atrial fibrillation with RVR ([facility] )    Right middle lobe pulmonary nodule    Essential hypertension    Acquired absence of other right toe(s) [facility]    Benign prostatic hyperplasia with lower urinary tract symptoms    Positive for microalbuminuria    Deep disruption or dehiscence of operation wound    Post-operative state    Aftercare for amputation stump    Ambulatory dysfunction    S/P transmetatarsal amputation of foot, right (HCC)    Sleep disturbance    Acute osteomyelitis of right ankle or foot ([facility]    Anxiety    S/P R ALT flap to right foot       Allergies: Allergies   Allergen Reactions    Simvastatin Myalgia    Itraconazole Other (See Comments)     Pt denies knowledge of allergy           Current Medications:       Current Outpatient Medications:     acetaminophen (Mapap Arthritis Pain) 650 mg CR tablet, Take 1 tablet (650 mg total) by mouth 2 (two) times a day, Disp: 60 tablet, Rfl: 1   atorvastatin (LIPITOR) 40 mg tablet, Take 1 tablet (40 mg total) by mouth daily, Disp: 90 tablet, Rfl: 3    Blood Glucose Monitoring Suppl (ACCU-CHEK JYOTI SMARTVIEW) w/Device KIT, use to check Blood Sugar as needed, Disp: , Rfl:     diltiazem (CARDIZEM) 90 mg tablet, TAKE 1 TABLET (90 MG TOTAL) BY MOUTH 2 (TWO) TIMES A DAY, Disp: 60 tablet, Rfl: 12    Eliquis 5 MG, TAKE 1 TABLET BY MOUTH TWICE A DAY, Disp: 60 tablet, Rfl: 12    glimepiride (AMARYL) 2 mg tablet, TAKE 1 TABLET (2 MG TOTAL) BY MOUTH 2 (TWO) TIMES A DAY, Disp: 60 tablet, Rfl: 0    glucose blood (Accu-Chek SmartView) test strip, Use as instructed BID, Disp: 200 each, Rfl: 6    Januvia 100 MG tablet, Take 1 tablet (100 mg total) by mouth daily, Disp: 132 tablet, Rfl: 3    lisinopril (ZESTRIL) 40 mg tablet, Take 1 tablet (40 mg total) by mouth daily, Disp: 90 tablet, Rfl: 3    metFORMIN (GLUCOPHAGE) 500 mg tablet, Take 1 tablet (500 mg total) by mouth 2 (two) times a day with meals, Disp: 180 tablet, Rfl: 6    metoprolol succinate (TOPROL-XL) 25 mg 24 hr tablet, Take (3) Tablets daily by mouth PO BID, Disp: 60 tablet, Rfl: 12    docusate sodium (COLACE) 100 mg capsule, Take 1 capsule (100 mg total) by mouth 2 (two) times a day (Patient not taking: Reported on 9/16/2021), Disp: 10 capsule, Rfl: 0    Past Medical History:       Past Medical History:   Diagnosis Date    Arthritis     Atrial fibrillation ([address] )     Diagnosed 11/2018    Benign prostate hyperplasia 04/2002    Cellulitis     right lower leg     Colon polyp 2006    Diabetes mellitus ([address] )     Hearing aid worn     bilat    Hearing loss     90% loss left ear and 40% right ear    History of clubfoot     "since birth"    History of pneumonia     History of TIA (transient ischemic attack) 04/25/2017 11/30/18 pt denies    Hyperlipidemia 5/15/2014    Hypertension     Infectious viral hepatitis     "cant remember type"    Irregular heart beat     Neuropathy     both feet    Osteomyelitis ([facility]     right great toe    Right middle lobe pulmonary nodule 11/6/2018    Seasickness     Teeth missing     Type 2 diabetes mellitus with diabetic neuropathy ([facility] ) 11/6/2018    Wears glasses     reading    Wound, open     bottom of right foot        Past Surgical History:   Procedure Laterality Date    BUNIONECTOMY Right 12/4/2018    Procedure: 5TH METATARSAL BONE PARTIAL RESECTION, FULL THICKNESS DEBRIDEMENT OF DIABETIC ULCER;  Surgeon: Oksana Caballero DPM;  [facility];  Service: Podiatry    CLUB FOOT RELEASE Bilateral     COLONOSCOPY      COMPLEX WOUND CLOSURE TO EXTREMITY  4/27/2021    Procedure: COMPLEX WOUND CLOSURE TO EXTREMITY: RIGHT FOOT;  Surgeon: Dave Parish MD;  [facility];  Service: Plastics    EXTERNAL FIXATOR APPLICATION Right 3/98/4346    Procedure: Application multiplane external fixation;  Surgeon: Juana Rinne, DPM;  [facility];  Service: Podiatry    FOOT HARDWARE REMOVAL Right 2/17/2021    Procedure: REMOVAL EXTERNAL FIXATOR;  Surgeon: Juana Rinne, DPM;  [facility];  Service: Podiatry    FOREIGN BODY REMOVAL Right 4/27/2021    Procedure: REMOVAL FOREIGN BODY EXTREMITY: RIGHT FOOT;  Surgeon: Dave Parish MD;  [facility];  Service: Plastics    INCISION AND DRAINAGE OF WOUND Right 2/17/2021    Procedure: INCISION AND DRAINAGE (I&D) EXTREMITY;  Surgeon: Juana Rinne, DPM;  [facility];  Service: Podiatry    ORIF FOOT FRACTURE Right 3/4/2021    Procedure: VAC PLACEMENT; SCREW FIXATION RIGHT FOOT FUSION;  Surgeon: Juana Rinne, DPM;  [facility];  Service: Podiatry    [address] [place] [address] Right 2/22/2021    Procedure: AMPUTATION TRANSMETATARSAL (TMA), REMOVAL NAIL IM T2 ICF HARDWARE;  Surgeon: Juana Rinne, DPM;  [facility];  Service: Podiatry    NJ FUSION FOOT BONES,MIDTARSAL,OSTEOTMY Right 2/12/2021    Procedure: foot ARTHRODESIS/FUSION; Surgeon: Raul Patel DPM;  [facility];  Service: Podiatry    ID LENGTH/SHORT LEG/ANKL TENDON,SINGLE Right 2/12/2021    Procedure: LENGTHEN TIBIAL TENDON, TRANS HEEL CORD;  Surgeon: Raul Patel DPM;  [facility];  Service: Podiatry    ID MUSCLE-SKIN FLAP,TRUNK Right 4/12/2021    Procedure: RECONSTRUCTION MICROSURGICAL W/ FREE FLAP;  Surgeon: Janine Main MD;  [facility];  Service: Plastics    ID MUSCLE-SKIN FLAP,TRUNK Right 4/12/2021    Procedure: TAKEBACK RECONSTRUCTION MICROSURGICAL W/ FREE FLAP;  Surgeon: Janine Main MD;  [facility];  Service: Plastics    ID MUSCLE-SKIN FLAP,TRUNK Right 4/13/2021    Procedure: RECONSTRUCTION MICROSURGICAL W/ FREE FLAP, RE EXPLORATION, MICRO VASCULAR REVISION;  Surgeon: Janine Main MD;  [facility];  Service: Plastics    ID PART [id number] [facility] BONE METATARSAL HEAD,EA Right 12/29/2020    Procedure: EXCISION EXOSTOSIS;  Surgeon: Raul Patel DPM;  [facility];  Service: [facility] SURG WND EXTEN/COMPLIC Right 28/40/9636    Procedure: PRIMARY DELAYED CLOSURE;  Surgeon: Raul Patel DPM;  [facility];  Service: Podiatry    TOE AMPUTATION Right     partial great toe    TONSILLECTOMY      VAC DRESSING APPLICATION Right 9/0/9387    Procedure: APPLICATION VAC DRESSING EXTREMITY;  Surgeon: Janine Main MD;  [facility];  Service: Plastics    [address] [place], [place] [address] [address] [place] [address] DEBRIDEMENT Right 3/1/2021    Procedure: DEBRIDEMENT LOWER EXTREMITY ([address] [facility]); Surgeon: Janine Main MD;  [facility];  Service: Plastics    WOUND DEBRIDEMENT Right 4/7/2021    Procedure: DEBRIDEMENT RIGHT FOOT;  Surgeon: Janine Main MD;  [facility];  Service: Plastics    WOUND DEBRIDEMENT Right 4/12/2021    Procedure: DEBRIDEMENT LOWER EXTREMITY [facility] OUT);   Surgeon: Janine Main MD;  [facility]; Service: Plastics    WOUND DEBRIDEMENT Right 2021    Procedure: DEBRIDEMENT LOWER EXTREMITY [facility] OUT): RIGHT FOOT;  Surgeon: Olvin Prather MD;  [facility];  Service: Plastics        Family History   Problem Relation Age of Onset    Diabetes Father         mellitus        Social History     Socioeconomic History    Marital status: /Civil Union     Spouse name: Not on file    Number of children: Not on file    Years of education: Not on file    Highest education level: Not on file   Occupational History    Not on file   Tobacco Use    Smoking status: Former Smoker     Types: Cigarettes     Quit date: 1988     Years since quittin 8    Smokeless tobacco: Former User    Tobacco comment: exposure to passive smoke   Vaping Use    Vaping Use: Never used   Substance and Sexual Activity    Alcohol use: Not Currently     Comment: socially    Drug use: Not Currently    Sexual activity: Yes     Partners: Female   Other Topics Concern    Not on file   Social History Narrative    Not on file     Social Determinants of Health     Financial Resource Strain:     Difficulty of Paying Living Expenses:    Food Insecurity:     Worried About Running Out of Food in the Last Year:     [address] in the Last Year:    Transportation Needs:     Lack of Transportation (Medical):      Lack of Transportation (Non-Medical):    Physical Activity:     Days of Exercise per Week:     Minutes of Exercise per Session:    Stress:     Feeling of Stress :    Social Connections:     Frequency of Communication with Friends and Family:     Frequency of Social Gatherings with Friends and Family:     Attends Ca[employer] Services:     Active Member of Clubs or Organizations:     Attends Club or Organization Meetings:     Marital Status:    Intimate Partner Violence:     Fear of Current or Ex-Partner:     Emotionally Abused:     Physically Abused:     Sexually Abused:         Physical Exam:     /86 (BP Location: Left arm, Patient Position: Sitting, Cuff Size: Large)   Pulse 87   Temp 99 2 °F (37 3 °C) (Tympanic)   Resp 17   Ht 6' 4" (1 93 m)   Wt 104 kg (230 lb)   SpO2 99%   BMI 28 00 kg/m²     Physical Exam  Vitals reviewed  Constitutional:       Appearance: Normal appearance  He is well-developed  HENT:      Head: Normocephalic  Right Ear: External ear normal       Left Ear: External ear normal    Eyes:      General:         Right eye: No discharge  Left eye: No discharge  Conjunctiva/sclera: Conjunctivae normal       Pupils: Pupils are equal, round, and reactive to light  Neck:      Thyroid: No thyromegaly  Cardiovascular:      Rate and Rhythm: Normal rate and regular rhythm  Pulses: Normal pulses  Heart sounds: Normal heart sounds  No murmur heard  Pulmonary:      Effort: Pulmonary effort is normal       Breath sounds: Normal breath sounds  Abdominal:      General: Bowel sounds are normal       Palpations: Abdomen is soft  Tenderness: There is no abdominal tenderness  Genitourinary:     Penis: Normal     Musculoskeletal:         General: Normal range of motion  Cervical back: Normal range of motion and neck supple  Lymphadenopathy:      Cervical: No cervical adenopathy  Skin:     General: Skin is warm  Findings: No rash  Neurological:      Mental Status: He is alert and oriented to person, place, and time  Psychiatric:         Mood and Affect: Mood normal          Behavior: Behavior normal          Thought Content: Thought content normal          Judgment: Judgment normal           Data:     Pre-operative work-up    Laboratory Results: not ordered      EKG: not ordered     Chest x-ray: not ordered       Previous cardiopulmonary studies within the past year:  · Echocardiogram: 7/22/21  · Cardiac Catheterization: no  · Stress Test: 7/22/2021  · Pulmonary Function Testing: no      Assessment & Recommendations:     1  Age-related cataract of both eyes, unspecified age-related cataract type     2  Pre-op evaluation     3  Viral disease exposure  COVID19, Influenza A/B, RSV PCR, [facility] - Collected in Office   grand daughter with viral sympotms    Will do screen in 5 days to clear       Pre-Op Evaluation Assessment  73 y o  male with planned surgery: bilateral cataracts   Known risk factors for perioperative complications: Coronary disease  Diabetes mellitus  atril fib  Current medications which may produce withdrawal symptoms if withheld perioperatively: no     Pre-Op Evaluation Plan  1  Further preoperative workup as follows:   - None; no further preoperative work-up is required    2  Medication Management/Recommendations:   - None, continue medication regimen including morning of surgery, with sip of water    3  Prophylaxis for cardiac events with perioperative beta-blockers: not indicated  4  Patient requires further consultation with: None    Clearance  Patient is CLEARED for surgery without any additional cardiac testing       Natalie Garvin, [place] [address] [place] [id number]  Phone#  [phone]  Fax#  [phone]

## 2021-09-20 ENCOUNTER — TELEPHONE (OUTPATIENT)
Dept: FAMILY MEDICINE CLINIC | Facility: CLINIC | Age: 71
End: 2021-09-20

## 2021-09-20 ENCOUNTER — CLINICAL SUPPORT (OUTPATIENT)
Dept: FAMILY MEDICINE CLINIC | Facility: CLINIC | Age: 71
End: 2021-09-20

## 2021-09-20 ENCOUNTER — NURSE TRIAGE (OUTPATIENT)
Dept: OTHER | Facility: OTHER | Age: 71
End: 2021-09-20

## 2021-09-20 DIAGNOSIS — Z20.828 VIRAL DISEASE EXPOSURE: Primary | ICD-10-CM

## 2021-09-20 PROCEDURE — 0241U HB NFCT DS VIR RESP RNA 4 TRGT: CPT | Performed by: NURSE PRACTITIONER

## 2021-09-20 NOTE — TELEPHONE ENCOUNTER
Patient was recently seen by Anabel Mayorga in the office and states that she did not fill out the part of his pamphlet regarding medications  Patient was advised to call back when the office is open  Reason for Disposition   [1] Caller has NON-URGENT medicine question about med that PCP prescribed AND [2] triager unable to answer question    Answer Assessment - Initial Assessment Questions  1  NAME of MEDICATION: "What medicine are you calling about?"      All medications   2  QUESTION: "What is your question?" (e g , medication refill, side effect)      Patient is scheduled for cataract surgery on 9/23 and is asking which medications he needs to stop  3  PRESCRIBING HCP: "Who prescribed it?" Reason: if prescribed by specialist, call should be referred to that group        Dr Santo Parents    Protocols used: MEDICATION QUESTION CALL-ADULT-

## 2021-09-20 NOTE — TELEPHONE ENCOUNTER
Patient called and has a question if he should stop taking any of his meds pre op the eye surgery  States he saw Andree last week but didn't mention it

## 2021-09-22 ENCOUNTER — TELEPHONE (OUTPATIENT)
Dept: FAMILY MEDICINE CLINIC | Facility: CLINIC | Age: 71
End: 2021-09-22

## 2021-09-22 NOTE — TELEPHONE ENCOUNTER
----- Message from Lorena Sanchez, [address] sent at 9/22/2021  7:35 AM EDT -----  All tests negative   Will fax over clearance for surgery

## 2021-09-24 ENCOUNTER — TELEPHONE (OUTPATIENT)
Dept: UROLOGY | Facility: MEDICAL CENTER | Age: 71
End: 2021-09-24

## 2021-09-24 NOTE — TELEPHONE ENCOUNTER
Master Moseley from [address] [place] [address] to confirm physician in our practice that the patient is followed by

## 2021-09-27 ENCOUNTER — TELEPHONE (OUTPATIENT)
Dept: PODIATRY | Facility: CLINIC | Age: 71
End: 2021-09-27

## 2021-09-27 DIAGNOSIS — M86.171 ACUTE OSTEOMYELITIS OF RIGHT ANKLE OR FOOT (HCC): Primary | ICD-10-CM

## 2021-09-27 RX ORDER — CEPHALEXIN 500 MG/1
500 CAPSULE ORAL EVERY 6 HOURS SCHEDULED
Qty: 28 CAPSULE | Refills: 0 | Status: SHIPPED | OUTPATIENT
Start: 2021-09-27 | End: 2021-10-01 | Stop reason: SDUPTHER

## 2021-09-27 NOTE — TELEPHONE ENCOUNTER
I spoke to wife and let her know it was sent to [facility] was upset it was not done on Friday--said toe is redder today than it was--[facility]

## 2021-09-29 ENCOUNTER — HOSPITAL ENCOUNTER (OUTPATIENT)
Dept: MRI IMAGING | Facility: HOSPITAL | Age: 71
Discharge: HOME | End: 2021-09-29
Attending: PODIATRIST
Payer: COMMERCIAL

## 2021-09-29 DIAGNOSIS — L97.412 DIABETIC ULCER OF RIGHT MIDFOOT ASSOCIATED WITH TYPE 2 DIABETES MELLITUS, WITH FAT LAYER EXPOSED (HCC): ICD-10-CM

## 2021-09-29 DIAGNOSIS — E11.621 DIABETIC ULCER OF RIGHT MIDFOOT ASSOCIATED WITH TYPE 2 DIABETES MELLITUS, WITH FAT LAYER EXPOSED (HCC): ICD-10-CM

## 2021-09-29 PROCEDURE — A9585 GADOBUTROL INJECTION: HCPCS | Performed by: PODIATRIST

## 2021-09-29 PROCEDURE — 73720 MRI LWR EXTREMITY W/O&W/DYE: CPT

## 2021-09-29 PROCEDURE — G1004 CDSM NDSC: HCPCS

## 2021-09-29 RX ADMIN — GADOBUTROL 10 ML: 604.72 INJECTION INTRAVENOUS at 15:26

## 2021-10-01 ENCOUNTER — OFFICE VISIT (OUTPATIENT)
Dept: WOUND CARE | Facility: HOSPITAL | Age: 71
End: 2021-10-01
Payer: COMMERCIAL

## 2021-10-01 VITALS
DIASTOLIC BLOOD PRESSURE: 100 MMHG | TEMPERATURE: 97.6 F | HEART RATE: 90 BPM | RESPIRATION RATE: 18 BRPM | SYSTOLIC BLOOD PRESSURE: 131 MMHG

## 2021-10-01 DIAGNOSIS — T81.32XD DEEP DISRUPTION OR DEHISCENCE OF OPERATION WOUND, SUBSEQUENT ENCOUNTER: ICD-10-CM

## 2021-10-01 DIAGNOSIS — E11.621 DIABETIC ULCER OF RIGHT MIDFOOT ASSOCIATED WITH TYPE 2 DIABETES MELLITUS, WITH FAT LAYER EXPOSED (HCC): ICD-10-CM

## 2021-10-01 DIAGNOSIS — M86.171 ACUTE OSTEOMYELITIS OF RIGHT ANKLE OR FOOT (HCC): ICD-10-CM

## 2021-10-01 DIAGNOSIS — Z01.818 PREOP TESTING: Primary | ICD-10-CM

## 2021-10-01 DIAGNOSIS — L97.412 DIABETIC ULCER OF RIGHT MIDFOOT ASSOCIATED WITH TYPE 2 DIABETES MELLITUS, WITH FAT LAYER EXPOSED (HCC): ICD-10-CM

## 2021-10-01 PROCEDURE — 99212 OFFICE O/P EST SF 10 MIN: CPT | Performed by: PODIATRIST

## 2021-10-01 PROCEDURE — 99024 POSTOP FOLLOW-UP VISIT: CPT | Performed by: PODIATRIST

## 2021-10-01 PROCEDURE — G0463 HOSPITAL OUTPT CLINIC VISIT: HCPCS | Performed by: PODIATRIST

## 2021-10-01 RX ORDER — CEPHALEXIN 500 MG/1
500 CAPSULE ORAL EVERY 6 HOURS SCHEDULED
Qty: 72 CAPSULE | Refills: 0 | Status: SHIPPED | OUTPATIENT
Start: 2021-10-01 | End: 2021-10-08

## 2021-10-07 ENCOUNTER — TELEPHONE (OUTPATIENT)
Dept: PODIATRY | Facility: CLINIC | Age: 71
End: 2021-10-07

## 2021-10-07 RX ORDER — CHLORHEXIDINE GLUCONATE 0.12 MG/ML
15 RINSE ORAL ONCE
Status: CANCELLED | OUTPATIENT
Start: 2021-10-25 | End: 2021-10-07

## 2021-10-07 RX ORDER — CEFAZOLIN SODIUM 2 G/50ML
2000 SOLUTION INTRAVENOUS ONCE
Status: CANCELLED | OUTPATIENT
Start: 2021-10-25 | End: 2021-10-07

## 2021-10-12 ENCOUNTER — TELEPHONE (OUTPATIENT)
Dept: OTHER | Facility: OTHER | Age: 71
End: 2021-10-12

## 2021-10-14 ENCOUNTER — CONSULT (OUTPATIENT)
Dept: FAMILY MEDICINE CLINIC | Facility: CLINIC | Age: 71
End: 2021-10-14
Payer: COMMERCIAL

## 2021-10-14 VITALS
HEIGHT: 76 IN | TEMPERATURE: 99.1 F | SYSTOLIC BLOOD PRESSURE: 150 MMHG | OXYGEN SATURATION: 98 % | WEIGHT: 228.2 LBS | HEART RATE: 100 BPM | BODY MASS INDEX: 27.79 KG/M2 | DIASTOLIC BLOOD PRESSURE: 72 MMHG

## 2021-10-14 DIAGNOSIS — M86.171 ACUTE OSTEOMYELITIS OF RIGHT ANKLE OR FOOT (HCC): ICD-10-CM

## 2021-10-14 DIAGNOSIS — E11.621 DIABETIC ULCER OF RIGHT MIDFOOT ASSOCIATED WITH TYPE 2 DIABETES MELLITUS, WITH FAT LAYER EXPOSED (HCC): ICD-10-CM

## 2021-10-14 DIAGNOSIS — Z01.818 PREPROCEDURAL GENERAL PHYSICAL EXAMINATION: Primary | ICD-10-CM

## 2021-10-14 DIAGNOSIS — L97.412 DIABETIC ULCER OF RIGHT MIDFOOT ASSOCIATED WITH TYPE 2 DIABETES MELLITUS, WITH FAT LAYER EXPOSED (HCC): ICD-10-CM

## 2021-10-14 DIAGNOSIS — T81.32XD DEEP DISRUPTION OR DEHISCENCE OF OPERATION WOUND, SUBSEQUENT ENCOUNTER: ICD-10-CM

## 2021-10-14 LAB — SL AMB POCT HEMOGLOBIN AIC: 8.6 (ref ?–6.5)

## 2021-10-14 PROCEDURE — 3077F SYST BP >= 140 MM HG: CPT | Performed by: NURSE PRACTITIONER

## 2021-10-14 PROCEDURE — 99214 OFFICE O/P EST MOD 30 MIN: CPT | Performed by: NURSE PRACTITIONER

## 2021-10-14 PROCEDURE — 3078F DIAST BP <80 MM HG: CPT | Performed by: NURSE PRACTITIONER

## 2021-10-14 PROCEDURE — 3008F BODY MASS INDEX DOCD: CPT | Performed by: NURSE PRACTITIONER

## 2021-10-14 PROCEDURE — 93000 ELECTROCARDIOGRAM COMPLETE: CPT | Performed by: NURSE PRACTITIONER

## 2021-10-14 PROCEDURE — 83036 HEMOGLOBIN GLYCOSYLATED A1C: CPT | Performed by: NURSE PRACTITIONER

## 2021-10-14 RX ORDER — PREDNISOLONE ACETATE 10 MG/ML
SUSPENSION/ DROPS OPHTHALMIC
COMMUNITY
Start: 2021-09-24 | End: 2021-11-30 | Stop reason: ALTCHOICE

## 2021-10-14 RX ORDER — OFLOXACIN 3 MG/ML
SOLUTION/ DROPS OPHTHALMIC
COMMUNITY
Start: 2021-09-24 | End: 2021-10-20 | Stop reason: ALTCHOICE

## 2021-10-15 ENCOUNTER — TELEPHONE (OUTPATIENT)
Dept: FAMILY MEDICINE CLINIC | Facility: CLINIC | Age: 71
End: 2021-10-15

## 2021-10-18 ENCOUNTER — TELEMEDICINE (OUTPATIENT)
Dept: FAMILY MEDICINE CLINIC | Facility: CLINIC | Age: 71
End: 2021-10-18
Payer: COMMERCIAL

## 2021-10-18 DIAGNOSIS — U07.1 COVID-19: Primary | ICD-10-CM

## 2021-10-18 PROCEDURE — 99213 OFFICE O/P EST LOW 20 MIN: CPT | Performed by: NURSE PRACTITIONER

## 2021-10-20 ENCOUNTER — ANESTHESIA EVENT (OUTPATIENT)
Dept: PERIOP | Facility: HOSPITAL | Age: 71
End: 2021-10-20
Payer: COMMERCIAL

## 2021-10-20 ENCOUNTER — TELEMEDICINE (OUTPATIENT)
Dept: FAMILY MEDICINE CLINIC | Facility: CLINIC | Age: 71
End: 2021-10-20
Payer: COMMERCIAL

## 2021-10-20 ENCOUNTER — LAB REQUISITION (OUTPATIENT)
Dept: LAB | Facility: HOSPITAL | Age: 71
End: 2021-10-20
Payer: COMMERCIAL

## 2021-10-20 VITALS — TEMPERATURE: 98.1 F

## 2021-10-20 DIAGNOSIS — I48.91 UNSPECIFIED ATRIAL FIBRILLATION (HCC): ICD-10-CM

## 2021-10-20 DIAGNOSIS — I10 ESSENTIAL (PRIMARY) HYPERTENSION: ICD-10-CM

## 2021-10-20 DIAGNOSIS — E11.69 TYPE 2 DIABETES MELLITUS WITH OTHER SPECIFIED COMPLICATION (HCC): ICD-10-CM

## 2021-10-20 DIAGNOSIS — M86.171 OTHER ACUTE OSTEOMYELITIS, RIGHT ANKLE AND FOOT (HCC): ICD-10-CM

## 2021-10-20 DIAGNOSIS — U07.1 COVID-19: Primary | ICD-10-CM

## 2021-10-20 LAB
ANION GAP SERPL CALCULATED.3IONS-SCNC: 12 MMOL/L (ref 4–13)
BASOPHILS # BLD AUTO: 0.01 THOUSANDS/ΜL (ref 0–0.1)
BASOPHILS NFR BLD AUTO: 0 % (ref 0–1)
BUN SERPL-MCNC: 15 MG/DL (ref 5–25)
CALCIUM SERPL-MCNC: 8.8 MG/DL (ref 8.3–10.1)
CHLORIDE SERPL-SCNC: 105 MMOL/L (ref 100–108)
CO2 SERPL-SCNC: 27 MMOL/L (ref 21–32)
CREAT SERPL-MCNC: 0.87 MG/DL (ref 0.6–1.3)
EOSINOPHIL # BLD AUTO: 0.09 THOUSAND/ΜL (ref 0–0.61)
EOSINOPHIL NFR BLD AUTO: 2 % (ref 0–6)
ERYTHROCYTE [DISTWIDTH] IN BLOOD BY AUTOMATED COUNT: 15.8 % (ref 11.6–15.1)
EST. AVERAGE GLUCOSE BLD GHB EST-MCNC: 197 MG/DL
GFR SERPL CREATININE-BSD FRML MDRD: 88 ML/MIN/1.73SQ M
GLUCOSE SERPL-MCNC: 209 MG/DL (ref 65–140)
HBA1C MFR BLD: 8.5 %
HCT VFR BLD AUTO: 45.8 % (ref 36.5–49.3)
HGB BLD-MCNC: 14.6 G/DL (ref 12–17)
IMM GRANULOCYTES # BLD AUTO: 0.03 THOUSAND/UL (ref 0–0.2)
IMM GRANULOCYTES NFR BLD AUTO: 1 % (ref 0–2)
LYMPHOCYTES # BLD AUTO: 1.41 THOUSANDS/ΜL (ref 0.6–4.47)
LYMPHOCYTES NFR BLD AUTO: 24 % (ref 14–44)
MCH RBC QN AUTO: 30.1 PG (ref 26.8–34.3)
MCHC RBC AUTO-ENTMCNC: 31.9 G/DL (ref 31.4–37.4)
MCV RBC AUTO: 94 FL (ref 82–98)
MONOCYTES # BLD AUTO: 0.6 THOUSAND/ΜL (ref 0.17–1.22)
MONOCYTES NFR BLD AUTO: 10 % (ref 4–12)
NEUTROPHILS # BLD AUTO: 3.73 THOUSANDS/ΜL (ref 1.85–7.62)
NEUTS SEG NFR BLD AUTO: 63 % (ref 43–75)
NRBC BLD AUTO-RTO: 0 /100 WBCS
PLATELET # BLD AUTO: 175 THOUSANDS/UL (ref 149–390)
PMV BLD AUTO: 11.8 FL (ref 8.9–12.7)
POTASSIUM SERPL-SCNC: 5.5 MMOL/L (ref 3.5–5.3)
RBC # BLD AUTO: 4.85 MILLION/UL (ref 3.88–5.62)
SODIUM SERPL-SCNC: 144 MMOL/L (ref 136–145)
WBC # BLD AUTO: 5.87 THOUSAND/UL (ref 4.31–10.16)

## 2021-10-20 PROCEDURE — 80048 BASIC METABOLIC PNL TOTAL CA: CPT | Performed by: PHYSICIAN ASSISTANT

## 2021-10-20 PROCEDURE — 3052F HG A1C>EQUAL 8.0%<EQUAL 9.0%: CPT | Performed by: NURSE PRACTITIONER

## 2021-10-20 PROCEDURE — 1160F RVW MEDS BY RX/DR IN RCRD: CPT | Performed by: NURSE PRACTITIONER

## 2021-10-20 PROCEDURE — 83036 HEMOGLOBIN GLYCOSYLATED A1C: CPT | Performed by: PHYSICIAN ASSISTANT

## 2021-10-20 PROCEDURE — 1036F TOBACCO NON-USER: CPT | Performed by: NURSE PRACTITIONER

## 2021-10-20 PROCEDURE — 99213 OFFICE O/P EST LOW 20 MIN: CPT | Performed by: NURSE PRACTITIONER

## 2021-10-20 PROCEDURE — 85025 COMPLETE CBC W/AUTO DIFF WBC: CPT | Performed by: PHYSICIAN ASSISTANT

## 2021-10-21 DIAGNOSIS — E11.40 TYPE 2 DIABETES MELLITUS WITH DIABETIC NEUROPATHY, WITHOUT LONG-TERM CURRENT USE OF INSULIN (HCC): Chronic | ICD-10-CM

## 2021-10-22 ENCOUNTER — TELEPHONE (OUTPATIENT)
Dept: PODIATRY | Facility: CLINIC | Age: 71
End: 2021-10-22

## 2021-10-22 RX ORDER — GLIMEPIRIDE 2 MG/1
2 TABLET ORAL 2 TIMES DAILY
Qty: 60 TABLET | Refills: 12 | Status: SHIPPED | OUTPATIENT
Start: 2021-10-22 | End: 2022-05-02 | Stop reason: SDUPTHER

## 2021-10-25 ENCOUNTER — ANESTHESIA (OUTPATIENT)
Dept: PERIOP | Facility: HOSPITAL | Age: 71
End: 2021-10-25
Payer: COMMERCIAL

## 2021-10-25 ENCOUNTER — TELEPHONE (OUTPATIENT)
Dept: PODIATRY | Facility: CLINIC | Age: 71
End: 2021-10-25

## 2021-10-27 ENCOUNTER — TELEPHONE (OUTPATIENT)
Dept: PODIATRY | Facility: CLINIC | Age: 71
End: 2021-10-27

## 2021-10-28 DIAGNOSIS — I48.91 ATRIAL FIBRILLATION WITH RVR (HCC): ICD-10-CM

## 2021-10-28 RX ORDER — METOPROLOL SUCCINATE 25 MG/1
TABLET, EXTENDED RELEASE ORAL
Qty: 60 TABLET | Refills: 11 | Status: SHIPPED | OUTPATIENT
Start: 2021-10-28 | End: 2021-12-23 | Stop reason: SDUPTHER

## 2021-10-29 ENCOUNTER — OFFICE VISIT (OUTPATIENT)
Dept: WOUND CARE | Facility: HOSPITAL | Age: 71
End: 2021-10-29
Payer: COMMERCIAL

## 2021-10-29 VITALS
TEMPERATURE: 97.1 F | HEART RATE: 111 BPM | RESPIRATION RATE: 16 BRPM | SYSTOLIC BLOOD PRESSURE: 162 MMHG | DIASTOLIC BLOOD PRESSURE: 86 MMHG

## 2021-10-29 DIAGNOSIS — L97.412 DIABETIC ULCER OF RIGHT MIDFOOT ASSOCIATED WITH TYPE 2 DIABETES MELLITUS, WITH FAT LAYER EXPOSED (HCC): Primary | ICD-10-CM

## 2021-10-29 DIAGNOSIS — E11.621 DIABETIC ULCER OF RIGHT MIDFOOT ASSOCIATED WITH TYPE 2 DIABETES MELLITUS, WITH FAT LAYER EXPOSED (HCC): Primary | ICD-10-CM

## 2021-10-29 PROCEDURE — 99212 OFFICE O/P EST SF 10 MIN: CPT | Performed by: PODIATRIST

## 2021-10-29 PROCEDURE — 99213 OFFICE O/P EST LOW 20 MIN: CPT | Performed by: PODIATRIST

## 2021-10-29 PROCEDURE — G0463 HOSPITAL OUTPT CLINIC VISIT: HCPCS | Performed by: PODIATRIST

## 2021-11-03 ENCOUNTER — TELEPHONE (OUTPATIENT)
Dept: FAMILY MEDICINE CLINIC | Facility: CLINIC | Age: 71
End: 2021-11-03

## 2021-11-04 ENCOUNTER — RA CDI HCC (OUTPATIENT)
Dept: OTHER | Facility: HOSPITAL | Age: 71
End: 2021-11-04

## 2021-11-08 RX ORDER — DILTIAZEM HCL 90 MG
1 TABLET ORAL 2 TIMES DAILY
COMMUNITY
Start: 2021-10-28 | End: 2022-05-02 | Stop reason: SDUPTHER

## 2021-11-11 ENCOUNTER — CONSULT (OUTPATIENT)
Dept: FAMILY MEDICINE CLINIC | Facility: CLINIC | Age: 71
End: 2021-11-11
Payer: COMMERCIAL

## 2021-11-11 VITALS
OXYGEN SATURATION: 99 % | WEIGHT: 227.6 LBS | BODY MASS INDEX: 28.3 KG/M2 | SYSTOLIC BLOOD PRESSURE: 142 MMHG | DIASTOLIC BLOOD PRESSURE: 82 MMHG | HEART RATE: 75 BPM | TEMPERATURE: 97.5 F | HEIGHT: 75 IN | RESPIRATION RATE: 18 BRPM

## 2021-11-11 DIAGNOSIS — N40.1 BENIGN PROSTATIC HYPERPLASIA WITH WEAK URINARY STREAM: Chronic | ICD-10-CM

## 2021-11-11 DIAGNOSIS — Z01.818 PREOP EXAMINATION: Primary | ICD-10-CM

## 2021-11-11 DIAGNOSIS — Z89.431 S/P TRANSMETATARSAL AMPUTATION OF FOOT, RIGHT (HCC): ICD-10-CM

## 2021-11-11 DIAGNOSIS — I10 ESSENTIAL HYPERTENSION: Chronic | ICD-10-CM

## 2021-11-11 DIAGNOSIS — R26.2 AMBULATORY DYSFUNCTION: ICD-10-CM

## 2021-11-11 DIAGNOSIS — R39.12 BENIGN PROSTATIC HYPERPLASIA WITH WEAK URINARY STREAM: Chronic | ICD-10-CM

## 2021-11-11 DIAGNOSIS — I48.91 ATRIAL FIBRILLATION WITH RVR (HCC): ICD-10-CM

## 2021-11-11 DIAGNOSIS — E11.40 TYPE 2 DIABETES MELLITUS WITH DIABETIC NEUROPATHY, WITHOUT LONG-TERM CURRENT USE OF INSULIN (HCC): Chronic | ICD-10-CM

## 2021-11-11 DIAGNOSIS — M86.171 ACUTE OSTEOMYELITIS OF RIGHT ANKLE OR FOOT (HCC): ICD-10-CM

## 2021-11-11 PROCEDURE — 3079F DIAST BP 80-89 MM HG: CPT | Performed by: FAMILY MEDICINE

## 2021-11-11 PROCEDURE — 3077F SYST BP >= 140 MM HG: CPT | Performed by: FAMILY MEDICINE

## 2021-11-11 PROCEDURE — 99214 OFFICE O/P EST MOD 30 MIN: CPT | Performed by: FAMILY MEDICINE

## 2021-11-12 ENCOUNTER — OFFICE VISIT (OUTPATIENT)
Dept: WOUND CARE | Facility: HOSPITAL | Age: 71
End: 2021-11-12
Payer: COMMERCIAL

## 2021-11-12 VITALS
HEART RATE: 95 BPM | RESPIRATION RATE: 18 BRPM | TEMPERATURE: 97.8 F | DIASTOLIC BLOOD PRESSURE: 87 MMHG | SYSTOLIC BLOOD PRESSURE: 166 MMHG

## 2021-11-12 DIAGNOSIS — E11.621 DIABETIC ULCER OF RIGHT MIDFOOT ASSOCIATED WITH TYPE 2 DIABETES MELLITUS, WITH FAT LAYER EXPOSED (HCC): Primary | ICD-10-CM

## 2021-11-12 DIAGNOSIS — L97.412 DIABETIC ULCER OF RIGHT MIDFOOT ASSOCIATED WITH TYPE 2 DIABETES MELLITUS, WITH FAT LAYER EXPOSED (HCC): Primary | ICD-10-CM

## 2021-11-12 PROCEDURE — 99212 OFFICE O/P EST SF 10 MIN: CPT | Performed by: PODIATRIST

## 2021-11-12 PROCEDURE — 99213 OFFICE O/P EST LOW 20 MIN: CPT | Performed by: PODIATRIST

## 2021-11-12 PROCEDURE — G0463 HOSPITAL OUTPT CLINIC VISIT: HCPCS | Performed by: PODIATRIST

## 2021-11-12 RX ORDER — LIDOCAINE 40 MG/G
CREAM TOPICAL ONCE
Status: COMPLETED | OUTPATIENT
Start: 2021-11-12 | End: 2021-11-12

## 2021-11-12 RX ADMIN — LIDOCAINE: 40 CREAM TOPICAL at 09:14

## 2021-11-16 ENCOUNTER — VBI (OUTPATIENT)
Dept: ADMINISTRATIVE | Facility: OTHER | Age: 71
End: 2021-11-16

## 2021-11-16 ENCOUNTER — TELEPHONE (OUTPATIENT)
Dept: FAMILY MEDICINE CLINIC | Facility: CLINIC | Age: 71
End: 2021-11-16

## 2021-11-16 ENCOUNTER — TELEPHONE (OUTPATIENT)
Dept: PODIATRY | Facility: CLINIC | Age: 71
End: 2021-11-16

## 2021-11-17 ENCOUNTER — TELEPHONE (OUTPATIENT)
Dept: FAMILY MEDICINE CLINIC | Facility: CLINIC | Age: 71
End: 2021-11-17

## 2021-11-20 DIAGNOSIS — I48.91 ATRIAL FIBRILLATION WITH RVR (HCC): ICD-10-CM

## 2021-11-20 RX ORDER — METOPROLOL SUCCINATE 25 MG/1
TABLET, EXTENDED RELEASE ORAL
Qty: 60 TABLET | Refills: 0 | Status: CANCELLED | OUTPATIENT
Start: 2021-11-20

## 2021-11-26 ENCOUNTER — OFFICE VISIT (OUTPATIENT)
Dept: WOUND CARE | Facility: HOSPITAL | Age: 71
End: 2021-11-26
Payer: COMMERCIAL

## 2021-11-26 VITALS
RESPIRATION RATE: 16 BRPM | SYSTOLIC BLOOD PRESSURE: 134 MMHG | HEART RATE: 75 BPM | TEMPERATURE: 97.5 F | DIASTOLIC BLOOD PRESSURE: 80 MMHG

## 2021-11-26 DIAGNOSIS — E11.621 DIABETIC ULCER OF RIGHT MIDFOOT ASSOCIATED WITH TYPE 2 DIABETES MELLITUS, WITH FAT LAYER EXPOSED (HCC): Primary | ICD-10-CM

## 2021-11-26 DIAGNOSIS — L97.412 DIABETIC ULCER OF RIGHT MIDFOOT ASSOCIATED WITH TYPE 2 DIABETES MELLITUS, WITH FAT LAYER EXPOSED (HCC): Primary | ICD-10-CM

## 2021-11-26 PROCEDURE — G0463 HOSPITAL OUTPT CLINIC VISIT: HCPCS | Performed by: PODIATRIST

## 2021-11-26 PROCEDURE — 99213 OFFICE O/P EST LOW 20 MIN: CPT | Performed by: PODIATRIST

## 2021-11-26 PROCEDURE — 99212 OFFICE O/P EST SF 10 MIN: CPT | Performed by: PODIATRIST

## 2021-11-29 ENCOUNTER — TELEPHONE (OUTPATIENT)
Dept: PODIATRY | Facility: CLINIC | Age: 71
End: 2021-11-29

## 2021-11-30 ENCOUNTER — TELEPHONE (OUTPATIENT)
Dept: FAMILY MEDICINE CLINIC | Facility: CLINIC | Age: 71
End: 2021-11-30

## 2021-12-02 ENCOUNTER — HOSPITAL ENCOUNTER (OUTPATIENT)
Facility: HOSPITAL | Age: 71
Setting detail: OUTPATIENT SURGERY
Discharge: HOME | End: 2021-12-02
Attending: PODIATRIST | Admitting: PODIATRIST
Payer: COMMERCIAL

## 2021-12-02 ENCOUNTER — APPOINTMENT (OUTPATIENT)
Dept: RADIOLOGY | Facility: HOSPITAL | Age: 71
End: 2021-12-02
Payer: COMMERCIAL

## 2021-12-02 VITALS
HEIGHT: 76 IN | RESPIRATION RATE: 18 BRPM | DIASTOLIC BLOOD PRESSURE: 78 MMHG | TEMPERATURE: 98.1 F | SYSTOLIC BLOOD PRESSURE: 130 MMHG | OXYGEN SATURATION: 96 % | HEART RATE: 67 BPM | WEIGHT: 223.33 LBS | BODY MASS INDEX: 27.2 KG/M2

## 2021-12-02 DIAGNOSIS — T81.32XD DEEP DISRUPTION OR DEHISCENCE OF OPERATION WOUND, SUBSEQUENT ENCOUNTER: ICD-10-CM

## 2021-12-02 DIAGNOSIS — M86.171 ACUTE OSTEOMYELITIS OF RIGHT ANKLE OR FOOT (HCC): ICD-10-CM

## 2021-12-02 DIAGNOSIS — Z01.818 PREOP TESTING: ICD-10-CM

## 2021-12-02 DIAGNOSIS — L97.412 DIABETIC ULCER OF RIGHT MIDFOOT ASSOCIATED WITH TYPE 2 DIABETES MELLITUS, WITH FAT LAYER EXPOSED (HCC): ICD-10-CM

## 2021-12-02 DIAGNOSIS — Z98.890 POST-OPERATIVE STATE: Primary | ICD-10-CM

## 2021-12-02 DIAGNOSIS — E11.621 DIABETIC ULCER OF RIGHT MIDFOOT ASSOCIATED WITH TYPE 2 DIABETES MELLITUS, WITH FAT LAYER EXPOSED (HCC): ICD-10-CM

## 2021-12-02 LAB
GLUCOSE SERPL-MCNC: 120 MG/DL (ref 65–140)
GLUCOSE SERPL-MCNC: 226 MG/DL (ref 65–140)

## 2021-12-02 PROCEDURE — 20700 MNL PREP&INSJ DP RX DLVR DEV: CPT | Performed by: PODIATRIST

## 2021-12-02 PROCEDURE — 87075 CULTR BACTERIA EXCEPT BLOOD: CPT | Performed by: PODIATRIST

## 2021-12-02 PROCEDURE — C1713 ANCHOR/SCREW BN/BN,TIS/BN: HCPCS | Performed by: PODIATRIST

## 2021-12-02 PROCEDURE — 28122 PARTIAL REMOVAL OF FOOT BONE: CPT | Performed by: PODIATRIST

## 2021-12-02 PROCEDURE — 73630 X-RAY EXAM OF FOOT: CPT

## 2021-12-02 PROCEDURE — NC001 PR NO CHARGE: Performed by: PODIATRIST

## 2021-12-02 PROCEDURE — 11044 DBRDMT BONE 1ST 20 SQ CM/<: CPT | Performed by: PODIATRIST

## 2021-12-02 PROCEDURE — 87077 CULTURE AEROBIC IDENTIFY: CPT | Performed by: PODIATRIST

## 2021-12-02 PROCEDURE — 87186 SC STD MICRODIL/AGAR DIL: CPT | Performed by: PODIATRIST

## 2021-12-02 PROCEDURE — 82948 REAGENT STRIP/BLOOD GLUCOSE: CPT

## 2021-12-02 PROCEDURE — 87205 SMEAR GRAM STAIN: CPT | Performed by: PODIATRIST

## 2021-12-02 PROCEDURE — 87070 CULTURE OTHR SPECIMN AEROBIC: CPT | Performed by: PODIATRIST

## 2021-12-02 DEVICE — RESORBABLE MINI BEAD KIT
Type: IMPLANTABLE DEVICE | Site: FOOT | Status: FUNCTIONAL
Brand: OSTEOSET

## 2021-12-02 RX ORDER — CEFAZOLIN SODIUM 2 G/50ML
2000 SOLUTION INTRAVENOUS ONCE
Status: COMPLETED | OUTPATIENT
Start: 2021-12-02 | End: 2021-12-02

## 2021-12-02 RX ORDER — CHLORHEXIDINE GLUCONATE 0.12 MG/ML
15 RINSE ORAL ONCE
Status: COMPLETED | OUTPATIENT
Start: 2021-12-02 | End: 2021-12-02

## 2021-12-02 RX ORDER — MEPERIDINE HYDROCHLORIDE 25 MG/ML
12.5 INJECTION INTRAMUSCULAR; INTRAVENOUS; SUBCUTANEOUS
Status: DISCONTINUED | OUTPATIENT
Start: 2021-12-02 | End: 2021-12-02 | Stop reason: HOSPADM

## 2021-12-02 RX ORDER — OXYCODONE HYDROCHLORIDE AND ACETAMINOPHEN 5; 325 MG/1; MG/1
1 TABLET ORAL EVERY 4 HOURS PRN
Qty: 10 TABLET | Refills: 0 | Status: SHIPPED | OUTPATIENT
Start: 2021-12-02 | End: 2021-12-12

## 2021-12-02 RX ORDER — LIDOCAINE HYDROCHLORIDE 20 MG/ML
INJECTION, SOLUTION EPIDURAL; INFILTRATION; INTRACAUDAL; PERINEURAL AS NEEDED
Status: DISCONTINUED | OUTPATIENT
Start: 2021-12-02 | End: 2021-12-02

## 2021-12-02 RX ORDER — FENTANYL CITRATE/PF 50 MCG/ML
25 SYRINGE (ML) INJECTION
Status: DISCONTINUED | OUTPATIENT
Start: 2021-12-02 | End: 2021-12-02 | Stop reason: HOSPADM

## 2021-12-02 RX ORDER — BUPIVACAINE HYDROCHLORIDE 2.5 MG/ML
INJECTION, SOLUTION EPIDURAL; INFILTRATION; INTRACAUDAL AS NEEDED
Status: DISCONTINUED | OUTPATIENT
Start: 2021-12-02 | End: 2021-12-02 | Stop reason: HOSPADM

## 2021-12-02 RX ORDER — PROPOFOL 10 MG/ML
INJECTION, EMULSION INTRAVENOUS AS NEEDED
Status: DISCONTINUED | OUTPATIENT
Start: 2021-12-02 | End: 2021-12-02

## 2021-12-02 RX ORDER — SODIUM CHLORIDE, SODIUM LACTATE, POTASSIUM CHLORIDE, CALCIUM CHLORIDE 600; 310; 30; 20 MG/100ML; MG/100ML; MG/100ML; MG/100ML
125 INJECTION, SOLUTION INTRAVENOUS CONTINUOUS
Status: DISCONTINUED | OUTPATIENT
Start: 2021-12-02 | End: 2021-12-02 | Stop reason: HOSPADM

## 2021-12-02 RX ORDER — ACETAMINOPHEN 325 MG/1
650 TABLET ORAL EVERY 4 HOURS PRN
Status: DISCONTINUED | OUTPATIENT
Start: 2021-12-02 | End: 2021-12-02 | Stop reason: HOSPADM

## 2021-12-02 RX ORDER — HYDROMORPHONE HCL/PF 1 MG/ML
0.5 SYRINGE (ML) INJECTION
Status: DISCONTINUED | OUTPATIENT
Start: 2021-12-02 | End: 2021-12-02 | Stop reason: HOSPADM

## 2021-12-02 RX ORDER — ONDANSETRON 2 MG/ML
4 INJECTION INTRAMUSCULAR; INTRAVENOUS ONCE AS NEEDED
Status: DISCONTINUED | OUTPATIENT
Start: 2021-12-02 | End: 2021-12-02 | Stop reason: HOSPADM

## 2021-12-02 RX ORDER — OXYCODONE HYDROCHLORIDE AND ACETAMINOPHEN 5; 325 MG/1; MG/1
1 TABLET ORAL EVERY 4 HOURS PRN
Status: DISCONTINUED | OUTPATIENT
Start: 2021-12-02 | End: 2021-12-02 | Stop reason: HOSPADM

## 2021-12-02 RX ORDER — ONDANSETRON 2 MG/ML
INJECTION INTRAMUSCULAR; INTRAVENOUS AS NEEDED
Status: DISCONTINUED | OUTPATIENT
Start: 2021-12-02 | End: 2021-12-02

## 2021-12-02 RX ORDER — LIDOCAINE HYDROCHLORIDE 10 MG/ML
INJECTION, SOLUTION EPIDURAL; INFILTRATION; INTRACAUDAL; PERINEURAL AS NEEDED
Status: DISCONTINUED | OUTPATIENT
Start: 2021-12-02 | End: 2021-12-02 | Stop reason: HOSPADM

## 2021-12-02 RX ORDER — MIDAZOLAM HYDROCHLORIDE 2 MG/2ML
INJECTION, SOLUTION INTRAMUSCULAR; INTRAVENOUS AS NEEDED
Status: DISCONTINUED | OUTPATIENT
Start: 2021-12-02 | End: 2021-12-02

## 2021-12-02 RX ORDER — FENTANYL CITRATE 50 UG/ML
INJECTION, SOLUTION INTRAMUSCULAR; INTRAVENOUS AS NEEDED
Status: DISCONTINUED | OUTPATIENT
Start: 2021-12-02 | End: 2021-12-02

## 2021-12-02 RX ORDER — PROPOFOL 10 MG/ML
INJECTION, EMULSION INTRAVENOUS CONTINUOUS PRN
Status: DISCONTINUED | OUTPATIENT
Start: 2021-12-02 | End: 2021-12-02

## 2021-12-02 RX ADMIN — PROPOFOL 30 MG: 10 INJECTION, EMULSION INTRAVENOUS at 15:51

## 2021-12-02 RX ADMIN — ONDANSETRON 4 MG: 2 INJECTION INTRAMUSCULAR; INTRAVENOUS at 15:48

## 2021-12-02 RX ADMIN — CEFAZOLIN SODIUM 2000 MG: 2 SOLUTION INTRAVENOUS at 15:37

## 2021-12-02 RX ADMIN — SODIUM CHLORIDE 0.6 MCG/KG/HR: 9 INJECTION, SOLUTION INTRAVENOUS at 15:48

## 2021-12-02 RX ADMIN — SODIUM CHLORIDE, SODIUM LACTATE, POTASSIUM CHLORIDE, AND CALCIUM CHLORIDE 125 ML/HR: .6; .31; .03; .02 INJECTION, SOLUTION INTRAVENOUS at 17:51

## 2021-12-02 RX ADMIN — OXYCODONE HYDROCHLORIDE AND ACETAMINOPHEN 1 TABLET: 5; 325 TABLET ORAL at 18:44

## 2021-12-02 RX ADMIN — CHLORHEXIDINE GLUCONATE 0.12% ORAL RINSE 15 ML: 1.2 LIQUID ORAL at 13:14

## 2021-12-02 RX ADMIN — SODIUM CHLORIDE, SODIUM LACTATE, POTASSIUM CHLORIDE, AND CALCIUM CHLORIDE 125 ML/HR: .6; .31; .03; .02 INJECTION, SOLUTION INTRAVENOUS at 16:55

## 2021-12-02 RX ADMIN — FENTANYL CITRATE 50 MCG: 50 INJECTION INTRAMUSCULAR; INTRAVENOUS at 15:40

## 2021-12-02 RX ADMIN — LIDOCAINE HYDROCHLORIDE 100 MG: 20 INJECTION, SOLUTION EPIDURAL; INFILTRATION; INTRACAUDAL; PERINEURAL at 15:48

## 2021-12-02 RX ADMIN — PROPOFOL 80 MCG/KG/MIN: 10 INJECTION, EMULSION INTRAVENOUS at 15:48

## 2021-12-02 RX ADMIN — SODIUM CHLORIDE, SODIUM LACTATE, POTASSIUM CHLORIDE, AND CALCIUM CHLORIDE 125 ML/HR: .6; .31; .03; .02 INJECTION, SOLUTION INTRAVENOUS at 13:14

## 2021-12-02 RX ADMIN — INSULIN LISPRO 5 UNITS: 100 INJECTION, SOLUTION INTRAVENOUS; SUBCUTANEOUS at 13:50

## 2021-12-02 RX ADMIN — MIDAZOLAM 2 MG: 1 INJECTION INTRAMUSCULAR; INTRAVENOUS at 15:40

## 2021-12-03 ENCOUNTER — TELEPHONE (OUTPATIENT)
Dept: FAMILY MEDICINE CLINIC | Facility: CLINIC | Age: 71
End: 2021-12-03

## 2021-12-03 LAB — GLUCOSE SERPL-MCNC: 178 MG/DL (ref 65–140)

## 2021-12-04 LAB
BACTERIA SPEC ANAEROBE CULT: NORMAL
BACTERIA WND AEROBE CULT: ABNORMAL
BACTERIA WND AEROBE CULT: ABNORMAL
GRAM STN SPEC: ABNORMAL

## 2021-12-06 ENCOUNTER — TELEPHONE (OUTPATIENT)
Dept: FAMILY MEDICINE CLINIC | Facility: CLINIC | Age: 71
End: 2021-12-06

## 2021-12-06 ENCOUNTER — TELEPHONE (OUTPATIENT)
Dept: PODIATRY | Facility: CLINIC | Age: 71
End: 2021-12-06

## 2021-12-08 DIAGNOSIS — L97.412 DIABETIC ULCER OF RIGHT MIDFOOT ASSOCIATED WITH TYPE 2 DIABETES MELLITUS, WITH FAT LAYER EXPOSED (HCC): Primary | ICD-10-CM

## 2021-12-08 DIAGNOSIS — E11.621 DIABETIC ULCER OF RIGHT MIDFOOT ASSOCIATED WITH TYPE 2 DIABETES MELLITUS, WITH FAT LAYER EXPOSED (HCC): Primary | ICD-10-CM

## 2021-12-08 RX ORDER — MINOCYCLINE HYDROCHLORIDE 50 MG/1
50 TABLET ORAL 2 TIMES DAILY
Qty: 20 TABLET | Refills: 0 | Status: SHIPPED | OUTPATIENT
Start: 2021-12-08 | End: 2021-12-18

## 2021-12-10 ENCOUNTER — OFFICE VISIT (OUTPATIENT)
Dept: WOUND CARE | Facility: HOSPITAL | Age: 71
End: 2021-12-10
Payer: COMMERCIAL

## 2021-12-10 VITALS
DIASTOLIC BLOOD PRESSURE: 99 MMHG | SYSTOLIC BLOOD PRESSURE: 167 MMHG | HEART RATE: 87 BPM | RESPIRATION RATE: 18 BRPM | TEMPERATURE: 97.5 F

## 2021-12-10 DIAGNOSIS — L97.412 DIABETIC ULCER OF RIGHT MIDFOOT ASSOCIATED WITH TYPE 2 DIABETES MELLITUS, WITH FAT LAYER EXPOSED (HCC): Primary | ICD-10-CM

## 2021-12-10 DIAGNOSIS — E11.621 DIABETIC ULCER OF RIGHT MIDFOOT ASSOCIATED WITH TYPE 2 DIABETES MELLITUS, WITH FAT LAYER EXPOSED (HCC): Primary | ICD-10-CM

## 2021-12-10 DIAGNOSIS — E11.40 TYPE 2 DIABETES MELLITUS WITH DIABETIC NEUROPATHY, UNSPECIFIED WHETHER LONG TERM INSULIN USE (HCC): Chronic | ICD-10-CM

## 2021-12-10 PROCEDURE — 99024 POSTOP FOLLOW-UP VISIT: CPT | Performed by: PODIATRIST

## 2021-12-10 PROCEDURE — 99212 OFFICE O/P EST SF 10 MIN: CPT | Performed by: PODIATRIST

## 2021-12-10 PROCEDURE — G0463 HOSPITAL OUTPT CLINIC VISIT: HCPCS | Performed by: PODIATRIST

## 2021-12-12 RX ORDER — SITAGLIPTIN 100 MG/1
100 TABLET, FILM COATED ORAL DAILY
Qty: 132 TABLET | Refills: 2 | Status: SHIPPED | OUTPATIENT
Start: 2021-12-12

## 2021-12-16 ENCOUNTER — RA CDI HCC (OUTPATIENT)
Dept: OTHER | Facility: HOSPITAL | Age: 71
End: 2021-12-16

## 2021-12-17 ENCOUNTER — OFFICE VISIT (OUTPATIENT)
Dept: WOUND CARE | Facility: HOSPITAL | Age: 71
End: 2021-12-17
Payer: COMMERCIAL

## 2021-12-17 VITALS
HEART RATE: 76 BPM | RESPIRATION RATE: 16 BRPM | TEMPERATURE: 97 F | SYSTOLIC BLOOD PRESSURE: 168 MMHG | DIASTOLIC BLOOD PRESSURE: 84 MMHG

## 2021-12-17 DIAGNOSIS — E11.621 DIABETIC ULCER OF RIGHT MIDFOOT ASSOCIATED WITH TYPE 2 DIABETES MELLITUS, WITH FAT LAYER EXPOSED (HCC): Primary | ICD-10-CM

## 2021-12-17 DIAGNOSIS — L97.412 DIABETIC ULCER OF RIGHT MIDFOOT ASSOCIATED WITH TYPE 2 DIABETES MELLITUS, WITH FAT LAYER EXPOSED (HCC): Primary | ICD-10-CM

## 2021-12-17 PROCEDURE — G0463 HOSPITAL OUTPT CLINIC VISIT: HCPCS | Performed by: PODIATRIST

## 2021-12-17 PROCEDURE — 99212 OFFICE O/P EST SF 10 MIN: CPT | Performed by: PODIATRIST

## 2021-12-17 PROCEDURE — 99024 POSTOP FOLLOW-UP VISIT: CPT | Performed by: PODIATRIST

## 2021-12-23 ENCOUNTER — OFFICE VISIT (OUTPATIENT)
Dept: FAMILY MEDICINE CLINIC | Facility: CLINIC | Age: 71
End: 2021-12-23
Payer: COMMERCIAL

## 2021-12-23 VITALS
BODY MASS INDEX: 28.01 KG/M2 | HEIGHT: 76 IN | OXYGEN SATURATION: 100 % | SYSTOLIC BLOOD PRESSURE: 160 MMHG | WEIGHT: 230 LBS | TEMPERATURE: 97.9 F | DIASTOLIC BLOOD PRESSURE: 88 MMHG | HEART RATE: 90 BPM

## 2021-12-23 DIAGNOSIS — Z12.11 SCREENING FOR COLORECTAL CANCER: Primary | ICD-10-CM

## 2021-12-23 DIAGNOSIS — I48.91 ATRIAL FIBRILLATION WITH RVR (HCC): ICD-10-CM

## 2021-12-23 DIAGNOSIS — E78.5 HYPERLIPIDEMIA, UNSPECIFIED HYPERLIPIDEMIA TYPE: Chronic | ICD-10-CM

## 2021-12-23 DIAGNOSIS — I10 ESSENTIAL HYPERTENSION: Chronic | ICD-10-CM

## 2021-12-23 DIAGNOSIS — N40.1 BENIGN PROSTATIC HYPERPLASIA WITH WEAK URINARY STREAM: Chronic | ICD-10-CM

## 2021-12-23 DIAGNOSIS — M86.171 ACUTE OSTEOMYELITIS OF RIGHT ANKLE OR FOOT (HCC): ICD-10-CM

## 2021-12-23 DIAGNOSIS — E11.40 TYPE 2 DIABETES MELLITUS WITH DIABETIC NEUROPATHY, WITHOUT LONG-TERM CURRENT USE OF INSULIN (HCC): Chronic | ICD-10-CM

## 2021-12-23 DIAGNOSIS — Z12.12 SCREENING FOR COLORECTAL CANCER: Primary | ICD-10-CM

## 2021-12-23 DIAGNOSIS — R39.12 BENIGN PROSTATIC HYPERPLASIA WITH WEAK URINARY STREAM: Chronic | ICD-10-CM

## 2021-12-23 PROCEDURE — 99214 OFFICE O/P EST MOD 30 MIN: CPT | Performed by: FAMILY MEDICINE

## 2021-12-23 PROCEDURE — 1036F TOBACCO NON-USER: CPT | Performed by: FAMILY MEDICINE

## 2021-12-23 PROCEDURE — 1160F RVW MEDS BY RX/DR IN RCRD: CPT | Performed by: FAMILY MEDICINE

## 2021-12-23 PROCEDURE — 3008F BODY MASS INDEX DOCD: CPT | Performed by: FAMILY MEDICINE

## 2021-12-23 RX ORDER — METOPROLOL SUCCINATE 25 MG/1
TABLET, EXTENDED RELEASE ORAL
Qty: 540 TABLET | Refills: 6 | Status: SHIPPED | OUTPATIENT
Start: 2021-12-23

## 2021-12-31 DIAGNOSIS — E78.5 HYPERLIPIDEMIA, UNSPECIFIED HYPERLIPIDEMIA TYPE: Chronic | ICD-10-CM

## 2022-01-03 DIAGNOSIS — E78.5 HYPERLIPIDEMIA, UNSPECIFIED HYPERLIPIDEMIA TYPE: Chronic | ICD-10-CM

## 2022-01-03 RX ORDER — ATORVASTATIN CALCIUM 40 MG/1
40 TABLET, FILM COATED ORAL DAILY
Qty: 90 TABLET | Refills: 3 | Status: SHIPPED | OUTPATIENT
Start: 2022-01-03

## 2022-01-04 RX ORDER — ATORVASTATIN CALCIUM 40 MG/1
40 TABLET, FILM COATED ORAL DAILY
Qty: 90 TABLET | Refills: 2 | Status: SHIPPED | OUTPATIENT
Start: 2022-01-04 | End: 2022-06-27

## 2022-01-06 ENCOUNTER — TELEPHONE (OUTPATIENT)
Dept: FAMILY MEDICINE CLINIC | Facility: CLINIC | Age: 72
End: 2022-01-06

## 2022-01-14 ENCOUNTER — OFFICE VISIT (OUTPATIENT)
Dept: WOUND CARE | Facility: HOSPITAL | Age: 72
End: 2022-01-14
Payer: COMMERCIAL

## 2022-01-14 VITALS
HEART RATE: 79 BPM | RESPIRATION RATE: 16 BRPM | TEMPERATURE: 96.8 F | DIASTOLIC BLOOD PRESSURE: 87 MMHG | SYSTOLIC BLOOD PRESSURE: 173 MMHG

## 2022-01-14 DIAGNOSIS — L97.412 DIABETIC ULCER OF RIGHT MIDFOOT ASSOCIATED WITH TYPE 2 DIABETES MELLITUS, WITH FAT LAYER EXPOSED (HCC): Primary | ICD-10-CM

## 2022-01-14 DIAGNOSIS — E11.621 DIABETIC ULCER OF RIGHT MIDFOOT ASSOCIATED WITH TYPE 2 DIABETES MELLITUS, WITH FAT LAYER EXPOSED (HCC): Primary | ICD-10-CM

## 2022-01-14 PROCEDURE — G0463 HOSPITAL OUTPT CLINIC VISIT: HCPCS | Performed by: PODIATRIST

## 2022-01-14 PROCEDURE — 99212 OFFICE O/P EST SF 10 MIN: CPT | Performed by: PODIATRIST

## 2022-01-14 PROCEDURE — 99024 POSTOP FOLLOW-UP VISIT: CPT | Performed by: PODIATRIST

## 2022-01-14 NOTE — PROGRESS NOTES
Patient ID: Baron Britt  is a 73 y o  male Date of Birth 1950     My role is Foot, Ankle and Wound Specialist    Chief Complaint   Patient presents with    Follow Up Wound Care Visit     right foot wound       DFU    Subjective:   Juliana Blancas is here the residual stump wound now s/p revision and bead implantation  He's here for a wound check today  No new pain, fevers  There is some drainage from the site but [facility] nurses notice it has diminished  He's essentially NWB        The following portions of the patient's history were reviewed and updated as appropriate:   Patient Active Problem List   Diagnosis    Hyperlipidemia    Type 2 diabetes mellitus with diabetic neuropathy ([address] )    Atrial fibrillation with RVR ([address] )    Right middle lobe pulmonary nodule    Essential hypertension    Acquired absence of other right toe(s) ([address] )    Benign prostatic hyperplasia with lower urinary tract symptoms    Positive for microalbuminuria    Deep disruption or dehiscence of operation wound    Post-operative state    Aftercare for amputation stump    Ambulatory dysfunction    Diabetic ulcer of right 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, with fat layer exposed ([address] )    S/P transmetatarsal amputation of foot, right ([address] )    Sleep disturbance    Acute osteomyelitis of right ankle or foot ([address] )    Anxiety    S/P R ALT flap to right foot    COVID-19     Past Medical History:   Diagnosis Date    Arthritis     Atrial fibrillation ([address] )     Diagnosed 11/2018    Benign prostate hyperplasia 04/2002    Cellulitis     right lower leg     Colon polyp 2006    Diabetes mellitus ([address] )     Hearing aid worn     bilat    Hearing loss     90% loss left ear and 40% right ear    History of clubfoot     "since birth"    History of pneumonia     History of TIA (transient ischemic attack) 04/25/2017 11/30/18 pt denies    Hyperlipidemia 5/15/2014    Hypertension     Infectious viral hepatitis     "cant remember type"    Irregular heart beat     Afib    Neuropathy     both feet    Osteomyelitis ([facility]     right great toe    Pneumonia     Right middle lobe pulmonary nodule 11/6/2018    Seasickness     Teeth missing     Type 2 diabetes mellitus with diabetic neuropathy ([facility] ) 11/6/2018    Wears glasses     reading    Wound, open     bottom of right foot     Past Surgical History:   Procedure Laterality Date    BUNIONECTOMY Right 12/4/2018    Procedure: 5TH METATARSAL BONE PARTIAL RESECTION, FULL THICKNESS DEBRIDEMENT OF DIABETIC ULCER;  Surgeon: Jon Shaikh DPM;  [facility];  Service: Podiatry    CLUB FOOT RELEASE Bilateral     COLONOSCOPY      COMPLEX WOUND CLOSURE TO EXTREMITY  4/27/2021    Procedure: COMPLEX WOUND CLOSURE TO EXTREMITY: RIGHT FOOT;  Surgeon: Davina Aleman MD;  [facility];  Service: Plastics    EXTERNAL FIXATOR APPLICATION Right 9/35/4813    Procedure: Application multiplane external fixation;  Surgeon: Cj Ford DPM;  [facility];  Service: Podiatry    FOOT HARDWARE REMOVAL Right 2/17/2021    Procedure: REMOVAL EXTERNAL FIXATOR;  Surgeon: Cj Ford DPM;  [facility];  Service: Podiatry    FOREIGN BODY REMOVAL Right 4/27/2021    Procedure: REMOVAL FOREIGN BODY EXTREMITY: RIGHT FOOT;  Surgeon: Davina Aleman MD;  [facility];  Service: Plastics    INCISION AND DRAINAGE OF WOUND Right 2/17/2021    Procedure: INCISION AND DRAINAGE (I&D) EXTREMITY;  Surgeon: Cj Ford DPM;  [facility];  Service: Podiatry    ORIF FOOT FRACTURE Right 3/4/2021    Procedure: VAC PLACEMENT; SCREW FIXATION RIGHT FOOT FUSION;  Surgeon: Cj Ford DPM;  [facility];  Service: Podiatry    [address] [place] [address] Right 2/22/2021    Procedure: AMPUTATION TRANSMETATARSAL (TMA), REMOVAL NAIL IM T2 ICF HARDWARE;  Surgeon: Cj Ford DPM;  [facility];  Service: Podiatry    MI DEBRIDEMENT, SKIN, SUB-Q TISSUE,MUSCLE,BONE,=<20 SQ CM Right 12/2/2021    Procedure: DEBRIDEMENT OF TARSAL BONES WITH ANITBIODIC BEADS;  Surgeon: Kan Sharma DPM;  [facility];  Service: Podiatry    OK FUSION FOOT BONES,MIDTARSAL,OSTEOTMY Right 2/12/2021    Procedure: foot ARTHRODESIS/FUSION;  Surgeon: Kan Sharma DPM;  [facility];  Service: Podiatry    OK LENGTH/SHORT LEG/ANKL TENDON,SINGLE Right 2/12/2021    Procedure: LENGTHEN TIBIAL TENDON, TRANS HEEL CORD;  Surgeon: Kan Sharma DPM;  [facility];  Service: Podiatry    OK MUSCLE-SKIN FLAP,TRUNK Right 4/12/2021    Procedure: RECONSTRUCTION MICROSURGICAL W/ FREE FLAP;  Surgeon: Elizabeth Antoine MD;  [facility];  Service: Plastics    OK MUSCLE-SKIN FLAP,TRUNK Right 4/12/2021    Procedure: TAKEBACK RECONSTRUCTION MICROSURGICAL W/ FREE FLAP;  Surgeon: Elizabeth Antoine MD;  [facility];  Service: Plastics    OK MUSCLE-SKIN FLAP,TRUNK Right 4/13/2021    Procedure: RECONSTRUCTION MICROSURGICAL W/ FREE FLAP, RE EXPLORATION, MICRO VASCULAR REVISION;  Surgeon: Elizabeth Antoine MD;  [facility];  Service: Plastics    OK PART [address] [facility] BONE METATARSAL HEAD,EA Right 12/29/2020    Procedure: EXCISION EXOSTOSIS;  Surgeon: Kan Sharma DPM;  [facility];  Service: Dani Jason SURG WND EXTEN/COMPLIC Right 77/60/9352    Procedure: PRIMARY DELAYED CLOSURE;  Surgeon: Kan Sharma DPM;  [facility];  Service: Podiatry    TOE AMPUTATION Right     partial great toe    TONSILLECTOMY      VAC DRESSING APPLICATION Right 0/2/1250    Procedure: APPLICATION VAC DRESSING EXTREMITY;  Surgeon: Elizabeth Antoine MD;  [facility];  Service: Plastics    [address] [place], [place] [address]way [address] [place] [address] DEBRIDEMENT Right 3/1/2021    Procedure: DEBRIDEMENT LOWER EXTREMITY ([address] OUT);   Surgeon: Elizabeth Antoine MD;  [facility];  Service: Plastics    WOUND DEBRIDEMENT Right 2021    Procedure: DEBRIDEMENT RIGHT FOOT;  Surgeon: Chloé Andrews MD;  [facility];  Service: Plastics    WOUND DEBRIDEMENT Right 2021    Procedure: DEBRIDEMENT LOWER EXTREMITY [facility] OUT);   Surgeon: Chloé Andrews MD;  [facility];  Service: Plastics    WOUND DEBRIDEMENT Right 2021    Procedure: DEBRIDEMENT LOWER EXTREMITY ([address]): RIGHT FOOT;  Surgeon: Chloé Andrews MD;  [facility];  Service: Plastics     Social History     Socioeconomic History    Marital status: /Civil Union     Spouse name: None    Number of children: None    Years of education: None    Highest education level: None   Occupational History    None   Tobacco Use    Smoking status: Former Smoker     Types: Cigarettes     Quit date: 1988     Years since quittin 2    Smokeless tobacco: Former User    Tobacco comment: exposure to passive smoke   Vaping Use    Vaping Use: Never used   Substance and Sexual Activity    Alcohol use: Yes     Comment: occasionally    Drug use: Not Currently    Sexual activity: Yes     Partners: Female   Other Topics Concern    None   Social History Narrative    None     Social Determinants of Health     Financial Resource Strain: Not on file   Food Insecurity: Not on file   Transportation Needs: Not on file   Physical Activity: Not on file   Stress: Not on file   Social Connections: Not on file   Intimate Partner Violence: Not on file   Housing Stability: Not on file        Current Outpatient Medications:     acetaminophen (Mapap Arthritis Pain) 650 mg CR tablet, Take 1 tablet (650 mg total) by mouth 2 (two) times a day, Disp: 60 tablet, Rfl: 1    atorvastatin (LIPITOR) 40 mg tablet, TAKE 1 TABLET (40 MG TOTAL) BY MOUTH DAILY, Disp: 90 tablet, Rfl: 2    atorvastatin (LIPITOR) 40 mg tablet, Take 1 tablet (40 mg total) by mouth daily, Disp: 90 tablet, Rfl: 3    Blood Glucose Monitoring Suppl (ACCU-CHEK JYOTI SMARTVIEW) w/Device KIT, use to check Blood Sugar as needed, Disp: , Rfl:     diltiazem (CARDIZEM) 90 mg tablet, Take 1 tablet by mouth 2 (two) times a day  , Disp: , Rfl:     Eliquis 5 MG, TAKE 1 TABLET BY MOUTH TWICE A DAY, Disp: 60 tablet, Rfl: 12    glimepiride (AMARYL) 2 mg tablet, Take 1 tablet (2 mg total) by mouth 2 (two) times a day, Disp: 60 tablet, Rfl: 12    glucose blood (Accu-Chek SmartView) test strip, Use as instructed BID, Disp: 200 each, Rfl: 6    Januvia 100 MG tablet, TAKE 1 TABLET (100 MG TOTAL) BY MOUTH DAILY, Disp: 132 tablet, Rfl: 2    lisinopril (ZESTRIL) 40 mg tablet, Take 1 tablet (40 mg total) by mouth daily, Disp: 90 tablet, Rfl: 3    metFORMIN (GLUCOPHAGE) 500 mg tablet, Take 1 tablet (500 mg total) by mouth 2 (two) times a day with meals, Disp: 180 tablet, Rfl: 6    metoprolol succinate (TOPROL-XL) 25 mg 24 hr tablet, TAKE (3) TABLETS DAILY BY MOUTH BY MOUTH TWICE A DAY, Disp: 540 tablet, Rfl: 6  Family History   Problem Relation Age of Onset    Diabetes Father         mellitus      Review of Systems   Constitutional: Positive for activity change  Negative for appetite change, chills, diaphoresis, fatigue, fever and unexpected weight change  Skin: Positive for wound  Allergies  Simvastatin and Itraconazole    Objective:  BP (!) 173/87   Pulse 79   Temp (!) 96 8 °F (36 °C)   Resp 16     Physical Exam      Wound 06/04/21 Diabetic Ulcer Foot Anterior;Right (Active)   Wound Image    01/14/22 0907   Wound Description Epithelialization;Eschar;Granulation tissue;Pink 01/14/22 0912   Lavern-wound Assessment Scar Tissue;Edema; Maceration;Pink 01/14/22 0912   Wound Length (cm) 1 5 cm 01/14/22 0912   Wound Width (cm) 4 7 cm 01/14/22 0912   Wound Depth (cm) 2 2 cm 01/14/22 0912   Wound Surface Area (cm^2) 7 05 cm^2 01/14/22 0912   Wound Volume (cm^3) 15 51 cm^3 01/14/22 0912   Calculated Wound Volume (cm^3) 15 51 cm^3 01/14/22 0912   Change in Wound Size % 50 29 01/14/22 0912   Wound Site Closure Surface sutures 12/17/21 0918   Drainage Amount Moderate 01/14/22 0912   Drainage Description Serosanguineous; [place] 01/14/22 0912   Non-staged Wound Description Full thickness 01/14/22 0912   Dressing Wound V A C  07/02/21 0847   Dressing Status Intact 01/14/22 0912           Wound 06/04/21 Diabetic Ulcer Foot Anterior;Right (Active)   Wound Image    01/14/22 0907   Wound Description Epithelialization;Eschar;Granulation tissue;Pink 01/14/22 0912   Lavern-wound Assessment Scar Tissue;Edema; Maceration;Pink 01/14/22 0912   Wound Length (cm) 1 5 cm 01/14/22 0912   Wound Width (cm) 4 7 cm 01/14/22 0912   Wound Depth (cm) 2 2 cm 01/14/22 0912   Wound Surface Area (cm^2) 7 05 cm^2 01/14/22 0912   Wound Volume (cm^3) 15 51 cm^3 01/14/22 0912   Calculated Wound Volume (cm^3) 15 51 cm^3 01/14/22 0912   Change in Wound Size % 50 29 01/14/22 0912   Wound Site Closure Surface sutures 12/17/21 0918   Drainage Amount Moderate 01/14/22 0912   Drainage Description Serosanguineous; [place] 01/14/22 0912   Non-staged Wound Description Full thickness 01/14/22 0912   Dressing Wound V A C  07/02/21 0847   Dressing Status Intact 01/14/22 0912                         Diagnosis:  1  Diabetic ulcer of right 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, with fat layer exposed ([address] )  -     Wound cleansing and dressings; Future  -     Wound off loading; Future  -     Wound home care; Future        Diagnosis ICD-10-CM Associated Orders   1  Diabetic ulcer of right 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, with fat layer exposed ([address] )  E11 621 Wound cleansing and dressings    [id number] Wound off loading     Wound home care        ASSESSMENT    Problems:    Chronic illness / Problem not at goal with exacerbation, progression or side effects of treatment  1  DFU        PLAN    Sutures removed  I note diminished drainage  I note wound depth is diminished  Continue packing  May WBAT cumulative 1 hour per day  Follow-up 3 weeks

## 2022-01-14 NOTE — PATIENT INSTRUCTIONS
Orders Placed This Encounter   Procedures    Wound cleansing and dressings     Right foot:  Wound care 3x/wk and prn strikethrough drainage or dislodgement  Do not get the dressing wet with shower water, snow or rain  Use cast cover or sponge bathe  On dressing change days wash foot with soap and water, rinse and pat dry - do not submerge  Cleanse wound with NSS moistened gauze  Apply light amount of desitin to periwound to decrease maceration  Apply a wick of 1/2 inch AMD packing into wound; do not over pack, tape tail to skin  Apply xeroform to area where sutures were removed  Cover with gauze and ABD   Secure with rolled gauze and tape  May use light ACE to keep dressing intact  This was applied today  Standing Status:   Future     Standing Expiration Date:   1/14/2023    Wound off loading     Right foot offloading/weight bear instructions: May weight bear with offloading shoe for a total of one hour in a day, otherwise have foot elevated       Standing Status:   Future     Standing Expiration Date:   1/14/2023    Wound home care     SL [facility]     Standing Status:   Future     Standing Expiration Date:   1/14/2023

## 2022-01-17 ENCOUNTER — CLINICAL SUPPORT (OUTPATIENT)
Dept: FAMILY MEDICINE CLINIC | Facility: CLINIC | Age: 72
End: 2022-01-17
Payer: COMMERCIAL

## 2022-01-17 DIAGNOSIS — Z23 NEED FOR INFLUENZA VACCINATION: Primary | ICD-10-CM

## 2022-01-17 PROCEDURE — 90662 IIV NO PRSV INCREASED AG IM: CPT | Performed by: FAMILY MEDICINE

## 2022-01-17 PROCEDURE — G0008 ADMIN INFLUENZA VIRUS VAC: HCPCS | Performed by: FAMILY MEDICINE

## 2022-01-20 DIAGNOSIS — I10 ESSENTIAL HYPERTENSION: ICD-10-CM

## 2022-01-20 PROCEDURE — 4010F ACE/ARB THERAPY RXD/TAKEN: CPT | Performed by: FAMILY MEDICINE

## 2022-01-20 RX ORDER — LISINOPRIL 40 MG/1
40 TABLET ORAL DAILY
Qty: 90 TABLET | Refills: 0 | Status: SHIPPED | OUTPATIENT
Start: 2022-01-20 | End: 2022-04-20

## 2022-02-04 ENCOUNTER — OFFICE VISIT (OUTPATIENT)
Dept: WOUND CARE | Facility: HOSPITAL | Age: 72
End: 2022-02-04
Payer: COMMERCIAL

## 2022-02-04 VITALS
HEART RATE: 85 BPM | TEMPERATURE: 97 F | DIASTOLIC BLOOD PRESSURE: 89 MMHG | SYSTOLIC BLOOD PRESSURE: 170 MMHG | RESPIRATION RATE: 16 BRPM

## 2022-02-04 DIAGNOSIS — E11.621 DIABETIC ULCER OF RIGHT MIDFOOT ASSOCIATED WITH TYPE 2 DIABETES MELLITUS, WITH FAT LAYER EXPOSED (HCC): Primary | ICD-10-CM

## 2022-02-04 DIAGNOSIS — L97.412 DIABETIC ULCER OF RIGHT MIDFOOT ASSOCIATED WITH TYPE 2 DIABETES MELLITUS, WITH FAT LAYER EXPOSED (HCC): Primary | ICD-10-CM

## 2022-02-04 PROCEDURE — 99024 POSTOP FOLLOW-UP VISIT: CPT | Performed by: PODIATRIST

## 2022-02-04 PROCEDURE — G0463 HOSPITAL OUTPT CLINIC VISIT: HCPCS | Performed by: PODIATRIST

## 2022-02-04 PROCEDURE — 99212 OFFICE O/P EST SF 10 MIN: CPT | Performed by: PODIATRIST

## 2022-02-04 NOTE — PROGRESS NOTES
Patient ID: Grace Hudson  is a 73 y o  male Date of Birth 1950     My role is Foot, Ankle and Wound Specialist    Chief Complaint   Patient presents with    Follow Up Wound Care Visit     right foot wound        Right foot wound    Subjective:   Darlene Guillen is here the residual stump wound now s/p revision and bead implantation  He's here for a wound check today  No new pain, fevers  There is some drainage from the site but [facility] nurses notice it has drastically diminished  He's essentially NWB        The following portions of the patient's history were reviewed and updated as appropriate:   Patient Active Problem List   Diagnosis    Hyperlipidemia    Type 2 diabetes mellitus with diabetic neuropathy ([address] )    Atrial fibrillation with RVR ([facility] [address] [facility]    Right middle lobe pulmonary nodule    Essential hypertension    Acquired absence of other right toe(s) ([address] )    Benign prostatic hyperplasia with lower urinary tract symptoms    Positive for microalbuminuria    Deep disruption or dehiscence of operation wound    Post-operative state    Aftercare for amputation stump    Ambulatory dysfunction    Diabetic ulcer of right 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, with fat layer exposed ([address] )    S/P transmetatarsal amputation of foot, right ([address] )    Sleep disturbance    Acute osteomyelitis of right ankle or foot ([address] )    Anxiety    S/P R ALT flap to right foot    COVID-19     Past Medical History:   Diagnosis Date    Arthritis     Atrial fibrillation ([address] )     Diagnosed 11/2018    Benign prostate hyperplasia 04/2002    Cellulitis     right lower leg     Colon polyp 2006    Diabetes mellitus ([address] )     Hearing aid worn     bilat    Hearing loss     90% loss left ear and 40% right ear    History of clubfoot     "since birth"    History of pneumonia     History of TIA (transient ischemic attack) 04/25/2017 11/30/18 pt denies    Hyperlipidemia 5/15/2014    Hypertension     Infectious viral hepatitis     "cant remember type"    Irregular heart beat     Afib    Neuropathy     both feet    Osteomyelitis ([facility] )     right great toe    Pneumonia     Right middle lobe pulmonary nodule 11/6/2018    Seasickness     Teeth missing     Type 2 diabetes mellitus with diabetic neuropathy ([facility] [address] ) 11/6/2018    Wears glasses     reading    Wound, open     bottom of right foot     Past Surgical History:   Procedure Laterality Date    BUNIONECTOMY Right 12/4/2018    Procedure: 5TH METATARSAL BONE PARTIAL RESECTION, FULL THICKNESS DEBRIDEMENT OF DIABETIC ULCER;  Surgeon: Karen Berger DPM;  [facility];  Service: Podiatry    CLUB FOOT RELEASE Bilateral     COLONOSCOPY      COMPLEX WOUND CLOSURE TO EXTREMITY  4/27/2021    Procedure: COMPLEX WOUND CLOSURE TO EXTREMITY: RIGHT FOOT;  Surgeon: Kely Pop MD;  [facility];  Service: Plastics    EXTERNAL FIXATOR APPLICATION Right 0/08/3523    Procedure: Application multiplane external fixation;  Surgeon: Alessandra Dawkins DPM;  [facility];  Service: Podiatry    FOOT HARDWARE REMOVAL Right 2/17/2021    Procedure: REMOVAL EXTERNAL FIXATOR;  Surgeon: Alessandra Dawkins DPM;  [facility];  Service: Podiatry    FOREIGN BODY REMOVAL Right 4/27/2021    Procedure: REMOVAL FOREIGN BODY EXTREMITY: RIGHT FOOT;  Surgeon: Kely Pop MD;  [facility];  Service: Plastics    INCISION AND DRAINAGE OF WOUND Right 2/17/2021    Procedure: INCISION AND DRAINAGE (I&D) EXTREMITY;  Surgeon: Alessandra Dawkins DPM;  [facility];  Service: Podiatry    ORIF FOOT FRACTURE Right 3/4/2021    Procedure: VAC PLACEMENT; SCREW FIXATION RIGHT FOOT FUSION;  Surgeon: Alessandra Dawkins DPM;  [facility];  Service: Podiatry    [address] [place] [address] Right 2/22/2021    Procedure: AMPUTATION TRANSMETATARSAL (TMA), REMOVAL NAIL IM T2 ICF HARDWARE;  Surgeon: Alessandra Dawkins DPM;  [facility]; Service: Podiatry    AL DEBRIDEMENT, SKIN, SUB-Q TISSUE,MUSCLE,BONE,=<20 SQ CM Right 12/2/2021    Procedure: DEBRIDEMENT OF TARSAL BONES WITH ANITBIODIC BEADS;  Surgeon: Polly Swan DPM;  [facility];  Service: Podiatry    AL FUSION FOOT BONES,MIDTARSAL,OSTEOTMY Right 2/12/2021    Procedure: foot ARTHRODESIS/FUSION;  Surgeon: Polly Swan DPM;  [facility];  Service: Podiatry    AL LENGTH/SHORT LEG/ANKL TENDON,SINGLE Right 2/12/2021    Procedure: LENGTHEN TIBIAL TENDON, TRANS HEEL CORD;  Surgeon: Polly Swan DPM;  [facility];  Service: Podiatry    AL MUSCLE-SKIN FLAP,TRUNK Right 4/12/2021    Procedure: RECONSTRUCTION MICROSURGICAL W/ FREE FLAP;  Surgeon: Milan Singh MD;  [facility];  Service: Plastics    AL MUSCLE-SKIN FLAP,TRUNK Right 4/12/2021    Procedure: TAKEBACK RECONSTRUCTION MICROSURGICAL W/ FREE FLAP;  Surgeon: Milan Singh MD;  [facility];  Service: Plastics    AL MUSCLE-SKIN FLAP,TRUNK Right 4/13/2021    Procedure: RECONSTRUCTION MICROSURGICAL W/ FREE FLAP, RE EXPLORATION, MICRO VASCULAR REVISION;  Surgeon: Milan Singh MD;  [facility];  Service: Plastics    AL PART [address] BONE METATARSAL HEAD,EA Right 12/29/2020    Procedure: EXCISION EXOSTOSIS;  Surgeon: Polly Swan DPM;  [facility];  Service: Donata Alyse SURG WND EXTEN/COMPLIC Right 13/02/7413    Procedure: PRIMARY DELAYED CLOSURE;  Surgeon: Polly Swan DPM;  [facility];  Service: Podiatry    TOE AMPUTATION Right     partial great toe    TONSILLECTOMY      VAC DRESSING APPLICATION Right 5/0/2734    Procedure: APPLICATION VAC DRESSING EXTREMITY;  Surgeon: Milan Singh MD;  [facility];  Service: Plastics    [address] DEBRIDEMENT Right 3/1/2021    Procedure: DEBRIDEMENT LOWER EXTREMITY ([address] OUT);   Surgeon: Milan Singh MD;  [facility]; Service: Plastics    WOUND DEBRIDEMENT Right 2021    Procedure: DEBRIDEMENT RIGHT FOOT;  Surgeon: Norma Marti MD;  [facility];  Service: Plastics    WOUND DEBRIDEMENT Right 2021    Procedure: DEBRIDEMENT LOWER EXTREMITY [facility] OUT);   Surgeon: Norma Marti MD;  [facility];  Service: Plastics    WOUND DEBRIDEMENT Right 2021    Procedure: DEBRIDEMENT LOWER EXTREMITY [facility] OUT): RIGHT FOOT;  Surgeon: Norma Marti MD;  [facility];  Service: Plastics     Social History     Socioeconomic History    Marital status: /Civil Union     Spouse name: Not on file    Number of children: Not on file    Years of education: Not on file    Highest education level: Not on file   Occupational History    Not on file   Tobacco Use    Smoking status: Former Smoker     Types: Cigarettes     Quit date: 1988     Years since quittin 2    Smokeless tobacco: Former User    Tobacco comment: exposure to passive smoke   Vaping Use    Vaping Use: Never used   Substance and Sexual Activity    Alcohol use: Yes     Comment: occasionally    Drug use: Not Currently    Sexual activity: Yes     Partners: Female   Other Topics Concern    Not on file   Social History Narrative    Not on file     Social Determinants of Health     Financial Resource Strain: Not on file   Food Insecurity: Not on file   Transportation Needs: Not on file   Physical Activity: Not on file   Stress: Not on file   Social Connections: Not on file   Intimate Partner Violence: Not on file   Housing Stability: Not on file        Current Outpatient Medications:     acetaminophen (Mapap Arthritis Pain) 650 mg CR tablet, Take 1 tablet (650 mg total) by mouth 2 (two) times a day, Disp: 60 tablet, Rfl: 1    atorvastatin (LIPITOR) 40 mg tablet, TAKE 1 TABLET (40 MG TOTAL) BY MOUTH DAILY, Disp: 90 tablet, Rfl: 2    atorvastatin (LIPITOR) 40 mg tablet, Take 1 tablet (40 mg total) by mouth daily, Disp: 90 tablet, Rfl: 3    Blood Glucose Monitoring Suppl (ACCU-CHEK JYOTI SMARTVIEW) w/Device KIT, use to check Blood Sugar as needed, Disp: , Rfl:     diltiazem (CARDIZEM) 90 mg tablet, Take 1 tablet by mouth 2 (two) times a day  , Disp: , Rfl:     Eliquis 5 MG, TAKE 1 TABLET BY MOUTH TWICE A DAY, Disp: 60 tablet, Rfl: 12    glimepiride (AMARYL) 2 mg tablet, Take 1 tablet (2 mg total) by mouth 2 (two) times a day, Disp: 60 tablet, Rfl: 12    glucose blood (Accu-Chek SmartView) test strip, Use as instructed BID, Disp: 200 each, Rfl: 6    Januvia 100 MG tablet, TAKE 1 TABLET (100 MG TOTAL) BY MOUTH DAILY, Disp: 132 tablet, Rfl: 2    lisinopril (ZESTRIL) 40 mg tablet, Take 1 tablet (40 mg total) by mouth daily, Disp: 90 tablet, Rfl: 0    metFORMIN (GLUCOPHAGE) 500 mg tablet, Take 1 tablet (500 mg total) by mouth 2 (two) times a day with meals, Disp: 180 tablet, Rfl: 6    metoprolol succinate (TOPROL-XL) 25 mg 24 hr tablet, TAKE (3) TABLETS DAILY BY MOUTH BY MOUTH TWICE A DAY, Disp: 540 tablet, Rfl: 6  Family History   Problem Relation Age of Onset    Diabetes Father         mellitus      Review of Systems   Constitutional: Positive for activity change  Negative for appetite change, chills, diaphoresis, fatigue, fever and unexpected weight change  Skin: Positive for wound  Allergies  Simvastatin and Itraconazole    Objective:  /89   Pulse 85   Temp (!) 97 °F (36 1 °C)   Resp 16     Physical Exam  Vitals and nursing note reviewed  Constitutional:       General: He is not in acute distress  Appearance: Normal appearance  He is not ill-appearing, toxic-appearing or diaphoretic  Neurological:      Mental Status: He is alert  Wound 06/04/21 Diabetic Ulcer Foot Anterior;Right (Active)   Wound Image   02/04/22 0927   Wound Description Pink;Yellow; Other (Comment) 02/04/22 0930   Lavern-wound Assessment Scar Tissue; Maceration;Pink 02/04/22 0930   Wound Length (cm) 0 5 cm 02/04/22 0930   Wound Width (cm) 0 2 cm 02/04/22 0930   Wound Depth (cm) 1 8 cm 02/04/22 0930   Wound Surface Area (cm^2) 0 1 cm^2 02/04/22 0930   Wound Volume (cm^3) 0 18 cm^3 02/04/22 0930   Calculated Wound Volume (cm^3) 0 18 cm^3 02/04/22 0930   Change in Wound Size % 99 42 02/04/22 0930   Wound Site Closure Surface sutures 12/17/21 0918   Drainage Amount Moderate 02/04/22 0930   Drainage Description Serosanguineous; Tan 02/04/22 0930   Non-staged Wound Description Full thickness 02/04/22 0930   Dressing Wound V A C  07/02/21 0847   Dressing Status Intact 01/14/22 0912           Wound 06/04/21 Diabetic Ulcer Foot Anterior;Right (Active)   Wound Image   02/04/22 0927   Wound Description Pink;Yellow; Other (Comment) 02/04/22 0930   Lavern-wound Assessment Scar Tissue; Maceration;Pink 02/04/22 0930   Wound Length (cm) 0 5 cm 02/04/22 0930   Wound Width (cm) 0 2 cm 02/04/22 0930   Wound Depth (cm) 1 8 cm 02/04/22 0930   Wound Surface Area (cm^2) 0 1 cm^2 02/04/22 0930   Wound Volume (cm^3) 0 18 cm^3 02/04/22 0930   Calculated Wound Volume (cm^3) 0 18 cm^3 02/04/22 0930   Change in Wound Size % 99 42 02/04/22 0930   Wound Site Closure Surface sutures 12/17/21 0918   Drainage Amount Moderate 02/04/22 0930   Drainage Description Serosanguineous; Tan 02/04/22 0930   Non-staged Wound Description Full thickness 02/04/22 0930   Dressing Wound V A C  07/02/21 0847   Dressing Status Intact 01/14/22 0912                         Diagnosis:  1  Diabetic ulcer of right 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, with fat layer exposed ([address] )  -     Wound cleansing and dressings; Future  -     Wound off loading; Future  -     Wound home care; Future        Diagnosis ICD-10-CM Associated Orders   1   Diabetic ulcer of right 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, with fat layer exposed ([facility] [address] )  [address]11 [id number] Wound cleansing and dressings    [id number] Wound off loading     Wound home care        ASSESSMENT    Problems:    Chronic illness / Problem not at goal with exacerbation, progression or side effects of treatment  1  DFU      PLAN    Will discontinue packing  Will order foam to wound site and cover  May increase ambulation  Will hold off on sending for shoe fitting until completely healed  Follow-up 2 weeks

## 2022-02-04 NOTE — PATIENT INSTRUCTIONS
Orders Placed This Encounter   Procedures    Wound cleansing and dressings       Right foot:  Wound care 3x/wk and prn strikethrough drainage or dislodgement  Do not get the dressing wet with shower water, snow or rain  Use cast cover or sponge bathe      On dressing change days wash foot with soap and water, rinse and pat dry - do not submerge  Cleanse wound with NSS moistened gauze  Apply light amount of desitin to periwound to decrease maceration  Apply polymem to wound   Cover with gauze and ABD   Secure with rolled gauze and tape  May use light ACE to keep dressing intact  This was applied today                  Standing Status:   Future     Standing Expiration Date:   2/4/2023    Wound off loading      Wound off loading      Right foot offloading/weight bear instructions: May weight bear with offloading shoe for a total of 1-4 hours in a day, otherwise have foot elevated       Standing Status:   Future     Standing Expiration Date:   2/4/2023    Wound home care     Continue  [facility] for dressing changes     Standing Status:   Future     Standing Expiration Date:   2/4/2023

## 2022-02-18 ENCOUNTER — OFFICE VISIT (OUTPATIENT)
Dept: WOUND CARE | Facility: HOSPITAL | Age: 72
End: 2022-02-18
Payer: COMMERCIAL

## 2022-02-18 ENCOUNTER — TELEPHONE (OUTPATIENT)
Dept: FAMILY MEDICINE CLINIC | Facility: CLINIC | Age: 72
End: 2022-02-18

## 2022-02-18 VITALS
TEMPERATURE: 97.8 F | HEART RATE: 75 BPM | DIASTOLIC BLOOD PRESSURE: 89 MMHG | RESPIRATION RATE: 18 BRPM | SYSTOLIC BLOOD PRESSURE: 160 MMHG

## 2022-02-18 DIAGNOSIS — L97.412 DIABETIC ULCER OF RIGHT MIDFOOT ASSOCIATED WITH TYPE 2 DIABETES MELLITUS, WITH FAT LAYER EXPOSED (HCC): ICD-10-CM

## 2022-02-18 DIAGNOSIS — E11.40 TYPE 2 DIABETES MELLITUS WITH DIABETIC NEUROPATHY, WITHOUT LONG-TERM CURRENT USE OF INSULIN (HCC): ICD-10-CM

## 2022-02-18 DIAGNOSIS — E11.621 DIABETIC ULCER OF RIGHT MIDFOOT ASSOCIATED WITH TYPE 2 DIABETES MELLITUS, WITH FAT LAYER EXPOSED (HCC): ICD-10-CM

## 2022-02-18 DIAGNOSIS — Z89.421 ACQUIRED ABSENCE OF OTHER RIGHT TOE(S) (HCC): Primary | ICD-10-CM

## 2022-02-18 PROCEDURE — G0463 HOSPITAL OUTPT CLINIC VISIT: HCPCS | Performed by: PODIATRIST

## 2022-02-18 PROCEDURE — 99024 POSTOP FOLLOW-UP VISIT: CPT | Performed by: PODIATRIST

## 2022-02-18 PROCEDURE — 99212 OFFICE O/P EST SF 10 MIN: CPT | Performed by: PODIATRIST

## 2022-02-18 NOTE — PATIENT INSTRUCTIONS
Orders Placed This Encounter   Procedures    Wound cleansing and dressings     Right foot wound: May remove dressing and shower  After shower cover with cosmopore   May wear sock, no compression needed  This was applied today     Standing Status:   Future     Standing Expiration Date:   2/18/2023    Wound off loading     May wear the offloading shoe until custom shoes are obtained  No limitation to amount of walking       Standing Status:   Future     Standing Expiration Date:   2/18/2023    Wound home care     [facility]     Standing Status:   Future     Standing Expiration Date:   2/18/2023

## 2022-02-18 NOTE — PROGRESS NOTES
Patient ID: Lauren Gonzalez  is a 73 y o  male Date of Birth 1950     My role is Foot, Ankle and Wound Specialist    Chief Complaint   Patient presents with    Follow Up Wound Care Visit     right foot       DFU    Subjective:   Nicolasa Page is here the residual stump wound now s/p revision and bead implantation  He's here for a wound check today  No new pain, fevers  There is some drainage from the site but [facility] nurses notice it has drastically diminished  He's been pretty active on the foot in the wedge shoe        The following portions of the patient's history were reviewed and updated as appropriate:   Patient Active Problem List   Diagnosis    Hyperlipidemia    Type 2 diabetes mellitus with diabetic neuropathy ([address] )    Atrial fibrillation with RVR ([address] )    Right middle lobe pulmonary nodule    Essential hypertension    Acquired absence of other right toe(s) ([address] )    Benign prostatic hyperplasia with lower urinary tract symptoms    Positive for microalbuminuria    Deep disruption or dehiscence of operation wound    Post-operative state    Aftercare for amputation stump    Ambulatory dysfunction    Diabetic ulcer of right 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, with fat layer exposed ([address] )    S/P transmetatarsal amputation of foot, right ([address] )    Sleep disturbance    Acute osteomyelitis of right ankle or foot ([address] )    Anxiety    S/P R ALT flap to right foot    COVID-19     Past Medical History:   Diagnosis Date    Arthritis     Atrial fibrillation ([address] )     Diagnosed 11/2018    Benign prostate hyperplasia 04/2002    Cellulitis     right lower leg     Colon polyp 2006    Diabetes mellitus ([address] )     Hearing aid worn     bilat    Hearing loss     90% loss left ear and 40% right ear    History of clubfoot     "since birth"    History of pneumonia     History of TIA (transient ischemic attack) 04/25/2017 11/30/18 pt denies    Hyperlipidemia 5/15/2014    Hypertension     Infectious viral hepatitis     "cant remember type"    Irregular heart beat     Afib    Neuropathy     both feet    Osteomyelitis [facility] )     right great toe    Pneumonia     Right middle lobe pulmonary nodule 11/6/2018    Seasickness     Teeth missing     Type 2 diabetes mellitus with diabetic neuropathy ([facility] ) 11/6/2018    Wears glasses     reading    Wound, open     bottom of right foot     Past Surgical History:   Procedure Laterality Date    BUNIONECTOMY Right 12/4/2018    Procedure: 5TH METATARSAL BONE PARTIAL RESECTION, FULL THICKNESS DEBRIDEMENT OF DIABETIC ULCER;  Surgeon: Hailey Green DPM;  [facility];  Service: Podiatry    CLUB FOOT RELEASE Bilateral     COLONOSCOPY      COMPLEX WOUND CLOSURE TO EXTREMITY  4/27/2021    Procedure: COMPLEX WOUND CLOSURE TO EXTREMITY: RIGHT FOOT;  Surgeon: Abisai Gong MD;  [facility];  Service: Plastics    EXTERNAL FIXATOR APPLICATION Right 8/63/2865    Procedure: Application multiplane external fixation;  Surgeon: Oriana Peace DPM;  [facility];  Service: Podiatry    FOOT HARDWARE REMOVAL Right 2/17/2021    Procedure: REMOVAL EXTERNAL FIXATOR;  Surgeon: Oriana Peace DPM;  [facility];  Service: Podiatry    FOREIGN BODY REMOVAL Right 4/27/2021    Procedure: REMOVAL FOREIGN BODY EXTREMITY: RIGHT FOOT;  Surgeon: Abisai Gong MD;  [facility];  Service: Plastics    INCISION AND DRAINAGE OF WOUND Right 2/17/2021    Procedure: INCISION AND DRAINAGE (I&D) EXTREMITY;  Surgeon: Oriana Peace DPM;  [facility];  Service: Podiatry    ORIF FOOT FRACTURE Right 3/4/2021    Procedure: VAC PLACEMENT; SCREW FIXATION RIGHT FOOT FUSION;  Surgeon: Oriana Peace DPM;  [facility];  Service: Podiatry    [address] [place] [address] Right 2/22/2021    Procedure: AMPUTATION TRANSMETATARSAL (TMA), REMOVAL NAIL IM T2 ICF HARDWARE;  Surgeon: Oriana Peace DPM;  [facility];  Service: Podiatry    MN DEBRIDEMENT, SKIN, SUB-Q TISSUE,MUSCLE,BONE,=<20 SQ CM Right 12/2/2021    Procedure: DEBRIDEMENT OF TARSAL BONES WITH ANITBIODIC BEADS;  Surgeon: Shelbie Clinton DPM;  [facility];  Service: Podiatry    MN FUSION FOOT BONES,MIDTARSAL,OSTEOTMY Right 2/12/2021    Procedure: foot ARTHRODESIS/FUSION;  Surgeon: Shelbie Clinton DPM;  [facility];  Service: Podiatry    MN LENGTH/SHORT LEG/ANKL TENDON,SINGLE Right 2/12/2021    Procedure: LENGTHEN TIBIAL TENDON, TRANS HEEL CORD;  Surgeon: Shelbie Clinton DPM;  [facility];  Service: Podiatry    MN MUSCLE-SKIN FLAP,TRUNK Right 4/12/2021    Procedure: RECONSTRUCTION MICROSURGICAL W/ FREE FLAP;  Surgeon: Vida Hartley MD;  [facility];  Service: Plastics    MN MUSCLE-SKIN FLAP,TRUNK Right 4/12/2021    Procedure: TAKEBACK RECONSTRUCTION MICROSURGICAL W/ FREE FLAP;  Surgeon: Vida Hartley MD;  [facility];  Service: Plastics    MN MUSCLE-SKIN FLAP,TRUNK Right 4/13/2021    Procedure: RECONSTRUCTION MICROSURGICAL W/ FREE FLAP, RE EXPLORATION, MICRO VASCULAR REVISION;  Surgeon: Vida Hartley MD;  [facility];  Service: Plastics    MN PART [address] [facility] BONE METATARSAL HEAD,EA Right 12/29/2020    Procedure: EXCISION EXOSTOSIS;  Surgeon: Shelbie Clinton DPM;  [facility];  Service: Ayde Edgar SURG WND EXTEN/COMPLIC Right 10/03/4196    Procedure: PRIMARY DELAYED CLOSURE;  Surgeon: Shelbie Clinton DPM;  [facility];  Service: Podiatry    TOE AMPUTATION Right     partial great toe    TONSILLECTOMY      VAC DRESSING APPLICATION Right 6/3/7759    Procedure: APPLICATION VAC DRESSING EXTREMITY;  Surgeon: Vida Hartley MD;  [facility];  Service: Plastics    [address] [place] [address] DEBRIDEMENT Right 3/1/2021    Procedure: DEBRIDEMENT LOWER EXTREMITY ([address] OUT);   Surgeon: Vida Hartley MD;  [facility];  Service: Plastics    WOUND DEBRIDEMENT Right 2021    Procedure: DEBRIDEMENT RIGHT FOOT;  Surgeon: Abisai Gong MD;  [facility];  Service: Plastics    WOUND DEBRIDEMENT Right 2021    Procedure: DEBRIDEMENT LOWER EXTREMITY [facility] OUT);   Surgeon: Abisai Gong MD;  [facility];  Service: Plastics    WOUND DEBRIDEMENT Right 2021    Procedure: DEBRIDEMENT LOWER EXTREMITY ([address]): RIGHT FOOT;  Surgeon: Abisai Gong MD;  [facility];  Service: Plastics     Social History     Socioeconomic History    Marital status: /Civil Union     Spouse name: None    Number of children: None    Years of education: None    Highest education level: None   Occupational History    None   Tobacco Use    Smoking status: Former Smoker     Types: Cigarettes     Quit date: 1988     Years since quittin 3    Smokeless tobacco: Former User    Tobacco comment: exposure to passive smoke   Vaping Use    Vaping Use: Never used   Substance and Sexual Activity    Alcohol use: Yes     Comment: occasionally    Drug use: Not Currently    Sexual activity: Yes     Partners: Female   Other Topics Concern    None   Social History Narrative    None     Social Determinants of Health     Financial Resource Strain: Not on file   Food Insecurity: Not on file   Transportation Needs: Not on file   Physical Activity: Not on file   Stress: Not on file   Social Connections: Not on file   Intimate Partner Violence: Not on file   Housing Stability: Not on file        Current Outpatient Medications:     acetaminophen (Mapap Arthritis Pain) 650 mg CR tablet, Take 1 tablet (650 mg total) by mouth 2 (two) times a day, Disp: 60 tablet, Rfl: 1    atorvastatin (LIPITOR) 40 mg tablet, TAKE 1 TABLET (40 MG TOTAL) BY MOUTH DAILY, Disp: 90 tablet, Rfl: 2    atorvastatin (LIPITOR) 40 mg tablet, Take 1 tablet (40 mg total) by mouth daily, Disp: 90 tablet, Rfl: 3    Blood Glucose Monitoring Suppl (ACCU-CHEK JYOTI SMARTVIEW) w/Device KIT, use to check Blood Sugar as needed, Disp: , Rfl:     diltiazem (CARDIZEM) 90 mg tablet, Take 1 tablet by mouth 2 (two) times a day  , Disp: , Rfl:     Eliquis 5 MG, TAKE 1 TABLET BY MOUTH TWICE A DAY, Disp: 60 tablet, Rfl: 12    glimepiride (AMARYL) 2 mg tablet, Take 1 tablet (2 mg total) by mouth 2 (two) times a day, Disp: 60 tablet, Rfl: 12    glucose blood (Accu-Chek SmartView) test strip, Use as instructed BID, Disp: 200 each, Rfl: 6    Januvia 100 MG tablet, TAKE 1 TABLET (100 MG TOTAL) BY MOUTH DAILY, Disp: 132 tablet, Rfl: 2    lisinopril (ZESTRIL) 40 mg tablet, Take 1 tablet (40 mg total) by mouth daily, Disp: 90 tablet, Rfl: 0    metFORMIN (GLUCOPHAGE) 500 mg tablet, Take 1 tablet (500 mg total) by mouth 2 (two) times a day with meals, Disp: 180 tablet, Rfl: 6    metoprolol succinate (TOPROL-XL) 25 mg 24 hr tablet, TAKE (3) TABLETS DAILY BY MOUTH BY MOUTH TWICE A DAY, Disp: 540 tablet, Rfl: 6  Family History   Problem Relation Age of Onset    Diabetes Father         mellitus      Review of Systems   Constitutional: Positive for activity change  Negative for appetite change, chills, diaphoresis, fatigue, fever and unexpected weight change  Skin: Positive for wound  Allergies  Simvastatin and Itraconazole    Objective:  /89   Pulse 75   Temp 97 8 °F (36 6 °C)   Resp 18     Physical Exam  Vitals and nursing note reviewed  Constitutional:       General: He is not in acute distress  Appearance: Normal appearance  He is not ill-appearing, toxic-appearing or diaphoretic  Musculoskeletal:      Comments: Transmetatarsal amputation  Neurological:      Mental Status: He is alert             Wound 06/04/21 Diabetic Ulcer Foot Anterior;Right (Active)   Wound Image    02/18/22 0916   Wound Description Pink 02/18/22 0923   Lavern-wound Assessment Scar Tissue 02/18/22 0923   Wound Length (cm) 0 1 cm 02/18/22 3544 Wound Width (cm) 0 1 cm 02/18/22 0923   Wound Depth (cm) 0 1 cm 02/18/22 0923   Wound Surface Area (cm^2) 0 01 cm^2 02/18/22 0923   Wound Volume (cm^3) 0 001 cm^3 02/18/22 0923   Calculated Wound Volume (cm^3) 0 cm^3 02/18/22 0923   Change in Wound Size % 100 02/18/22 0923   Wound Site Closure Surface sutures 12/17/21 0918   Drainage Amount Scant 02/18/22 0923   Drainage Description Yellow 02/18/22 0923   Non-staged Wound Description Full thickness 02/18/22 0923   Dressing Wound V A C  07/02/21 0847   Dressing Status Intact 02/18/22 0923           Wound 06/04/21 Diabetic Ulcer Foot Anterior;Right (Active)   Wound Image    02/18/22 0916   Wound Description Pink 02/18/22 0923   Lavern-wound Assessment Scar Tissue 02/18/22 0923   Wound Length (cm) 0 1 cm 02/18/22 0923   Wound Width (cm) 0 1 cm 02/18/22 0923   Wound Depth (cm) 0 1 cm 02/18/22 0923   Wound Surface Area (cm^2) 0 01 cm^2 02/18/22 0923   Wound Volume (cm^3) 0 001 cm^3 02/18/22 0923   Calculated Wound Volume (cm^3) 0 cm^3 02/18/22 0923   Change in Wound Size % 100 02/18/22 0923   Wound Site Closure Surface sutures 12/17/21 0918   Drainage Amount Scant 02/18/22 0923   Drainage Description Yellow 02/18/22 0923   Non-staged Wound Description Full thickness 02/18/22 0923   Dressing Wound V A C  07/02/21 0847   Dressing Status Intact 02/18/22 0923                         Diagnosis:  1  Acquired absence of other right toe(s) ([address] )  -     Diabetic Shoe Inserts  -     Diabetic Shoe    2  Diabetic ulcer of right 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, with fat layer exposed ([address] )  -     Wound cleansing and dressings; Future  -     Wound off loading; Future  -     Wound home care; Future    3  Type 2 diabetes mellitus with diabetic neuropathy, without long-term current use of insulin (HCC)  -     Diabetic Shoe Inserts  -     Diabetic Shoe        Diagnosis ICD-10-CM Associated Orders   1   Acquired absence of other right toe(s) ([facility] )  B63 270 Diabetic Shoe Inserts     Diabetic Shoe   2  Diabetic ulcer of right 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, with fat layer exposed ([facility] )  E11 621 Wound cleansing and dressings    L97 412 Wound off loading     Wound home care   3  Type 2 diabetes mellitus with diabetic neuropathy, without long-term current use of insulin ([facility])  E11 40 Diabetic Shoe Inserts     Diabetic Shoe        ASSESSMENT    Problems:    Chronic illness / Problem not at goal with exacerbation, progression or side effects of treatment  1  DFU          PLAN    The wound is almost healed  It only probes a couple of millimeters and there is scant drainage  He may walk in the surgical shoe normally  I wrote a prescription for a custom partial foot filler and shoes today  I'll check him back here in three weeks to confirm complete healing  I recommend a small island dressing to the site

## 2022-02-23 ENCOUNTER — TELEPHONE (OUTPATIENT)
Dept: FAMILY MEDICINE CLINIC | Facility: CLINIC | Age: 72
End: 2022-02-23

## 2022-02-23 NOTE — TELEPHONE ENCOUNTER
Karma Reese from [facility] stated patient is being discharged from service today      Karma Reese  [phone]

## 2022-02-26 ENCOUNTER — TELEPHONE (OUTPATIENT)
Dept: GASTROENTEROLOGY | Facility: MEDICAL CENTER | Age: 72
End: 2022-02-26

## 2022-03-02 ENCOUNTER — TELEPHONE (OUTPATIENT)
Dept: FAMILY MEDICINE CLINIC | Facility: CLINIC | Age: 72
End: 2022-03-02

## 2022-03-04 ENCOUNTER — TELEPHONE (OUTPATIENT)
Dept: FAMILY MEDICINE CLINIC | Facility: CLINIC | Age: 72
End: 2022-03-04

## 2022-03-04 DIAGNOSIS — E11.40 TYPE 2 DIABETES MELLITUS WITH DIABETIC NEUROPATHY, WITHOUT LONG-TERM CURRENT USE OF INSULIN (HCC): Primary | ICD-10-CM

## 2022-03-04 NOTE — TELEPHONE ENCOUNTER
Patient is requesting a script for one touch lancets, and test strips to be sent [facility]   He is obtaining a one touch verio flex blood sugar monitor from his insurance company

## 2022-03-07 DIAGNOSIS — E11.40 TYPE 2 DIABETES MELLITUS WITH DIABETIC NEUROPATHY, WITHOUT LONG-TERM CURRENT USE OF INSULIN (HCC): Primary | ICD-10-CM

## 2022-03-08 RX ORDER — BLOOD SUGAR DIAGNOSTIC
1 STRIP MISCELLANEOUS DAILY
Qty: 100 STRIP | Refills: 3 | Status: SHIPPED | OUTPATIENT
Start: 2022-03-08

## 2022-03-08 RX ORDER — BLOOD SUGAR DIAGNOSTIC
STRIP MISCELLANEOUS
Qty: 100 STRIP | Refills: 3 | Status: SHIPPED | OUTPATIENT
Start: 2022-03-08

## 2022-03-08 RX ORDER — LANCETS
EACH MISCELLANEOUS
Qty: 100 EACH | Refills: 3 | Status: SHIPPED | OUTPATIENT
Start: 2022-03-08

## 2022-03-08 RX ORDER — LANCETS
EACH MISCELLANEOUS DAILY
Qty: 100 EACH | Refills: 3 | Status: SHIPPED | OUTPATIENT
Start: 2022-03-08

## 2022-03-11 ENCOUNTER — OFFICE VISIT (OUTPATIENT)
Dept: WOUND CARE | Facility: HOSPITAL | Age: 72
End: 2022-03-11
Payer: COMMERCIAL

## 2022-03-11 VITALS
TEMPERATURE: 97.7 F | RESPIRATION RATE: 18 BRPM | DIASTOLIC BLOOD PRESSURE: 105 MMHG | SYSTOLIC BLOOD PRESSURE: 179 MMHG | HEART RATE: 85 BPM

## 2022-03-11 DIAGNOSIS — E11.621 DIABETIC ULCER OF RIGHT MIDFOOT ASSOCIATED WITH TYPE 2 DIABETES MELLITUS, WITH FAT LAYER EXPOSED (HCC): Primary | ICD-10-CM

## 2022-03-11 DIAGNOSIS — L97.412 DIABETIC ULCER OF RIGHT MIDFOOT ASSOCIATED WITH TYPE 2 DIABETES MELLITUS, WITH FAT LAYER EXPOSED (HCC): Primary | ICD-10-CM

## 2022-03-11 PROCEDURE — G0463 HOSPITAL OUTPT CLINIC VISIT: HCPCS | Performed by: PODIATRIST

## 2022-03-11 PROCEDURE — 99212 OFFICE O/P EST SF 10 MIN: CPT | Performed by: PODIATRIST

## 2022-03-11 PROCEDURE — 99213 OFFICE O/P EST LOW 20 MIN: CPT | Performed by: PODIATRIST

## 2022-03-11 NOTE — PROGRESS NOTES
Patient ID: Tracey Tejeda  is a 73 y o  male Date of Birth 1950     My role is Foot, Ankle and Wound Specialist    Chief Complaint   Patient presents with    Follow Up Wound Care Visit     right foot        DFU    Subjective:   Zeeshan Cabello is here the residual stump wound now s/p revision and bead implantation  He's here for a wound check today  No new pain, fevers  No drainage noted  He is seeing the  this week and told he will have new custom shoes and insoles in 3 weeks         The following portions of the patient's history were reviewed and updated as appropriate:   Patient Active Problem List   Diagnosis    Hyperlipidemia    Type 2 diabetes mellitus with diabetic neuropathy ([address] )    Atrial fibrillation with RVR ([facility] )    Right middle lobe pulmonary nodule    Essential hypertension    Acquired absence of other right toe(s) ([address] )    Benign prostatic hyperplasia with lower urinary tract symptoms    Positive for microalbuminuria    Deep disruption or dehiscence of operation wound    Post-operative state    Aftercare for amputation stump    Ambulatory dysfunction    Diabetic ulcer of right 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, with fat layer exposed ([address] )    S/P transmetatarsal amputation of foot, right ([address] )    Sleep disturbance    Acute osteomyelitis of right ankle or foot ([address] )    Anxiety    S/P R ALT flap to right foot    COVID-19     Past Medical History:   Diagnosis Date    Arthritis     Atrial fibrillation ([address] )     Diagnosed 11/2018    Benign prostate hyperplasia 04/2002    Cellulitis     right lower leg     Colon polyp 2006    Diabetes mellitus ([address] )     Hearing aid worn     bilat    Hearing loss     90% loss left ear and 40% right ear    History of clubfoot     "since birth"    History of pneumonia     History of TIA (transient ischemic attack) 04/25/2017 11/30/18 pt denies    Hyperlipidemia 5/15/2014    Hypertension     Infectious viral hepatitis "cant remember type"    Irregular heart beat     Afib    Neuropathy     both feet    Osteomyelitis ([facility] )     right great toe    Pneumonia     Right middle lobe pulmonary nodule 11/6/2018    Seasickness     Teeth missing     Type 2 diabetes mellitus with diabetic neuropathy ([facility] ) 11/6/2018    Wears glasses     reading    Wound, open     bottom of right foot     Past Surgical History:   Procedure Laterality Date    BUNIONECTOMY Right 12/4/2018    Procedure: 5TH METATARSAL BONE PARTIAL RESECTION, FULL THICKNESS DEBRIDEMENT OF DIABETIC ULCER;  Surgeon: Clarisa Garcia DPM;  [facility];  Service: Podiatry    CLUB FOOT RELEASE Bilateral     COLONOSCOPY      COMPLEX WOUND CLOSURE TO EXTREMITY  4/27/2021    Procedure: COMPLEX WOUND CLOSURE TO EXTREMITY: RIGHT FOOT;  Surgeon: Vida Hartley MD;  [facility];  Service: Plastics    EXTERNAL FIXATOR APPLICATION Right 3/50/5848    Procedure: Application multiplane external fixation;  Surgeon: Shelbie Clinton DPM;  [facility];  Service: Podiatry    FOOT HARDWARE REMOVAL Right 2/17/2021    Procedure: REMOVAL EXTERNAL FIXATOR;  Surgeon: Shelbie Clinton DPM;  [facility];  Service: Podiatry    FOREIGN BODY REMOVAL Right 4/27/2021    Procedure: REMOVAL FOREIGN BODY EXTREMITY: RIGHT FOOT;  Surgeon: Vida Hartley MD;  [facility];  Service: Plastics    INCISION AND DRAINAGE OF WOUND Right 2/17/2021    Procedure: INCISION AND DRAINAGE (I&D) EXTREMITY;  Surgeon: Shelbie Clinton DPM;  [facility];  Service: Podiatry    ORIF FOOT FRACTURE Right 3/4/2021    Procedure: VAC PLACEMENT; SCREW FIXATION RIGHT FOOT FUSION;  Surgeon: Shelbie Clinton DPM;  [facility];  Service: Podiatry    [address] [place] [address] Right 2/22/2021    Procedure: AMPUTATION TRANSMETATARSAL (TMA), REMOVAL NAIL IM T2 ICF HARDWARE;  Surgeon: Shelbie Clinton DPM;  [facility];  Service: Podiatry  CO DEBRIDEMENT, SKIN, SUB-Q TISSUE,MUSCLE,BONE,=<20 SQ CM Right 12/2/2021    Procedure: DEBRIDEMENT OF TARSAL BONES WITH ANITBIODIC BEADS;  Surgeon: Dottie Patton DPM;  [facility];  Service: Podiatry    CO FUSION FOOT BONES,MIDTARSAL,OSTEOTMY Right 2/12/2021    Procedure: foot ARTHRODESIS/FUSION;  Surgeon: Dottie Patton DPM;  [facility];  Service: Podiatry    CO LENGTH/SHORT LEG/ANKL TENDON,SINGLE Right 2/12/2021    Procedure: LENGTHEN TIBIAL TENDON, TRANS HEEL CORD;  Surgeon: Dottie Patton DPM;  [facility];  Service: Podiatry    CO MUSCLE-SKIN FLAP,TRUNK Right 4/12/2021    Procedure: RECONSTRUCTION MICROSURGICAL W/ FREE FLAP;  Surgeon: Jose Munoz MD;  [facility];  Service: Plastics    CO MUSCLE-SKIN FLAP,TRUNK Right 4/12/2021    Procedure: TAKEBACK RECONSTRUCTION MICROSURGICAL W/ FREE FLAP;  Surgeon: Jose Munoz MD;  [facility];  Service: Plastics    CO MUSCLE-SKIN FLAP,TRUNK Right 4/13/2021    Procedure: RECONSTRUCTION MICROSURGICAL W/ FREE FLAP, RE EXPLORATION, MICRO VASCULAR REVISION;  Surgeon: Jose Munoz MD;  [facility];  Service: Plastics    CO PART [address] [facility] BONE METATARSAL HEAD,EA Right 12/29/2020    Procedure: EXCISION EXOSTOSIS;  Surgeon: Dottie Patton DPM;  [facility];  Service: Particia Postin SURG WND EXTEN/COMPLIC Right 30/04/6254    Procedure: PRIMARY DELAYED CLOSURE;  Surgeon: Dottie Patton DPM;  [facility];  Service: Podiatry    TOE AMPUTATION Right     partial great toe    TONSILLECTOMY      VAC DRESSING APPLICATION Right 2/8/7014    Procedure: APPLICATION VAC DRESSING EXTREMITY;  Surgeon: Jose Munoz MD;  [facility];  Service: Plastics    [facility] [address] [place] [address] DEBRIDEMENT Right 3/1/2021    Procedure: DEBRIDEMENT LOWER EXTREMITY ([address] OUT);   Surgeon: Jose Munoz MD;  [facility];  Service: Plastics  WOUND DEBRIDEMENT Right 2021    Procedure: DEBRIDEMENT RIGHT FOOT;  Surgeon: Rosibel Duarte MD;  [facility];  Service: Plastics    WOUND DEBRIDEMENT Right 2021    Procedure: DEBRIDEMENT LOWER EXTREMITY [facility] OUT);   Surgeon: Rosibel Duarte MD;  [facility];  Service: Plastics    WOUND DEBRIDEMENT Right 2021    Procedure: DEBRIDEMENT LOWER EXTREMITY ([address]): RIGHT FOOT;  Surgeon: Rosibel Duarte MD;  [facility];  Service: Plastics     Social History     Socioeconomic History    Marital status: /Civil Union     Spouse name: None    Number of children: None    Years of education: None    Highest education level: None   Occupational History    None   Tobacco Use    Smoking status: Former Smoker     Types: Cigarettes     Quit date: 1988     Years since quittin 3    Smokeless tobacco: Former User    Tobacco comment: exposure to passive smoke   Vaping Use    Vaping Use: Never used   Substance and Sexual Activity    Alcohol use: Yes     Comment: occasionally    Drug use: Not Currently    Sexual activity: Yes     Partners: Female   Other Topics Concern    None   Social History Narrative    None     Social Determinants of Health     Financial Resource Strain: Not on file   Food Insecurity: Not on file   Transportation Needs: Not on file   Physical Activity: Not on file   Stress: Not on file   Social Connections: Not on file   Intimate Partner Violence: Not on file   Housing Stability: Not on file        Current Outpatient Medications:     acetaminophen (Mapap Arthritis Pain) 650 mg CR tablet, Take 1 tablet (650 mg total) by mouth 2 (two) times a day, Disp: 60 tablet, Rfl: 1    atorvastatin (LIPITOR) 40 mg tablet, TAKE 1 TABLET (40 MG TOTAL) BY MOUTH DAILY, Disp: 90 tablet, Rfl: 2    atorvastatin (LIPITOR) 40 mg tablet, Take 1 tablet (40 mg total) by mouth daily, Disp: 90 tablet, Rfl: 3    diltiazem (CARDIZEM) 90 mg tablet, Take 1 tablet by mouth 2 (two) times a day  , Disp: , Rfl:     Eliquis 5 MG, TAKE 1 TABLET BY MOUTH TWICE A DAY, Disp: 60 tablet, Rfl: 12    glimepiride (AMARYL) 2 mg tablet, Take 1 tablet (2 mg total) by mouth 2 (two) times a day, Disp: 60 tablet, Rfl: 12    glucose blood (OneTouch Ultra) test strip, Use as instructed, Disp: 100 strip, Rfl: 3    glucose blood (OneTouch Verio) test strip, Use 1 each daily Use as instructed, Disp: 100 strip, Rfl: 3    Januvia 100 MG tablet, TAKE 1 TABLET (100 MG TOTAL) BY MOUTH DAILY, Disp: 132 tablet, Rfl: 2    Lancets (onetouch ultrasoft) lancets, Use daily, Disp: 100 each, Rfl: 3    Lancets (onetouch ultrasoft) lancets, Use as instructed, Disp: 100 each, Rfl: 3    lisinopril (ZESTRIL) 40 mg tablet, Take 1 tablet (40 mg total) by mouth daily, Disp: 90 tablet, Rfl: 0    metFORMIN (GLUCOPHAGE) 500 mg tablet, Take 1 tablet (500 mg total) by mouth 2 (two) times a day with meals, Disp: 180 tablet, Rfl: 6    metoprolol succinate (TOPROL-XL) 25 mg 24 hr tablet, TAKE (3) TABLETS DAILY BY MOUTH BY MOUTH TWICE A DAY, Disp: 540 tablet, Rfl: 6  Family History   Problem Relation Age of Onset    Diabetes Father         mellitus      Review of Systems   Constitutional: Positive for activity change  Negative for appetite change, chills, diaphoresis, fatigue, fever and unexpected weight change  Skin: Negative for wound  Allergies  Simvastatin and Itraconazole    Objective:  BP (!) 179/105   Pulse 85   Temp 97 7 °F (36 5 °C)   Resp 18     Physical Exam  Vitals and nursing note reviewed  Constitutional:       General: He is not in acute distress  Appearance: Normal appearance  He is not ill-appearing, toxic-appearing or diaphoretic  Musculoskeletal:      Comments: Transmetatarsal amputation  Neurological:      Mental Status: He is alert  Diagnosis:  1   Diabetic ulcer of right 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, with fat layer exposed ([facility] )  -     Wound cleansing and dressings; Future        Diagnosis ICD-10-CM Associated Orders   1  Diabetic ulcer of right 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, with fat layer exposed ([facility] )  E11 621 Wound cleansing and dressings    L97 412         ASSESSMENT    Problems:    Chronic illness / Problem not at goal with exacerbation, progression or side effects of treatment  1  Healed DFU      PLAN    No further wound care needed  Patient instructed to see me in the office for a final check after being fitted for his custom footwear

## 2022-03-11 NOTE — PATIENT INSTRUCTIONS
Orders Placed This Encounter   Procedures    Wound cleansing and dressings     The wound is healed  Call for an appointment in Dr Vic Boston office in 5 weeks       Standing Status:   Future     Standing Expiration Date:   3/11/2023

## 2022-04-07 ENCOUNTER — TELEPHONE (OUTPATIENT)
Dept: FAMILY MEDICINE CLINIC | Facility: CLINIC | Age: 72
End: 2022-04-07

## 2022-04-07 NOTE — TELEPHONE ENCOUNTER
Sugars this morning was 170  Continuing at this range for the last 3 weeks  Has a cough, greenish in color for 2 weeks, tested with home test for covid last week and was negative  Patient is asking if that is why his sugars are up due to the cough  Is questioning if he needs to be seen  He is aware you are booked and today and off tomorrow  Was requesting a phone call from you  Please advise

## 2022-04-18 ENCOUNTER — VBI (OUTPATIENT)
Dept: ADMINISTRATIVE | Facility: OTHER | Age: 72
End: 2022-04-18

## 2022-04-21 ENCOUNTER — OFFICE VISIT (OUTPATIENT)
Dept: PODIATRY | Facility: CLINIC | Age: 72
End: 2022-04-21
Payer: COMMERCIAL

## 2022-04-21 VITALS
WEIGHT: 233 LBS | BODY MASS INDEX: 28.36 KG/M2 | SYSTOLIC BLOOD PRESSURE: 157 MMHG | DIASTOLIC BLOOD PRESSURE: 92 MMHG | HEART RATE: 88 BPM

## 2022-04-21 DIAGNOSIS — L97.412 DIABETIC ULCER OF RIGHT MIDFOOT ASSOCIATED WITH TYPE 2 DIABETES MELLITUS, WITH FAT LAYER EXPOSED (HCC): Primary | ICD-10-CM

## 2022-04-21 DIAGNOSIS — E11.40 TYPE 2 DIABETES MELLITUS WITH DIABETIC NEUROPATHY, WITHOUT LONG-TERM CURRENT USE OF INSULIN (HCC): Chronic | ICD-10-CM

## 2022-04-21 DIAGNOSIS — Z89.431 S/P TRANSMETATARSAL AMPUTATION OF FOOT, RIGHT (HCC): ICD-10-CM

## 2022-04-21 DIAGNOSIS — Z89.421 ACQUIRED ABSENCE OF OTHER RIGHT TOE(S) (HCC): ICD-10-CM

## 2022-04-21 DIAGNOSIS — Z47.81 AFTERCARE FOR AMPUTATION STUMP: ICD-10-CM

## 2022-04-21 DIAGNOSIS — E11.621 DIABETIC ULCER OF RIGHT MIDFOOT ASSOCIATED WITH TYPE 2 DIABETES MELLITUS, WITH FAT LAYER EXPOSED (HCC): Primary | ICD-10-CM

## 2022-04-21 PROCEDURE — 99213 OFFICE O/P EST LOW 20 MIN: CPT | Performed by: PODIATRIST

## 2022-04-22 ENCOUNTER — TELEPHONE (OUTPATIENT)
Dept: PODIATRY | Facility: CLINIC | Age: 72
End: 2022-04-22

## 2022-04-22 NOTE — TELEPHONE ENCOUNTER
[facility] needs a copy of the script provided to this patient for his shoes  Patient could not provide the fax number  Nor could she provide an "attention to" name

## 2022-05-02 ENCOUNTER — OFFICE VISIT (OUTPATIENT)
Dept: FAMILY MEDICINE CLINIC | Facility: CLINIC | Age: 72
End: 2022-05-02
Payer: COMMERCIAL

## 2022-05-02 VITALS
BODY MASS INDEX: 27.96 KG/M2 | WEIGHT: 229.6 LBS | TEMPERATURE: 96.6 F | HEART RATE: 108 BPM | OXYGEN SATURATION: 99 % | DIASTOLIC BLOOD PRESSURE: 92 MMHG | SYSTOLIC BLOOD PRESSURE: 152 MMHG | HEIGHT: 76 IN | RESPIRATION RATE: 20 BRPM

## 2022-05-02 DIAGNOSIS — N40.1 BENIGN PROSTATIC HYPERPLASIA WITH WEAK URINARY STREAM: Primary | ICD-10-CM

## 2022-05-02 DIAGNOSIS — Z12.11 SCREENING FOR COLON CANCER: ICD-10-CM

## 2022-05-02 DIAGNOSIS — I10 ESSENTIAL HYPERTENSION: ICD-10-CM

## 2022-05-02 DIAGNOSIS — R39.12 BENIGN PROSTATIC HYPERPLASIA WITH WEAK URINARY STREAM: Primary | ICD-10-CM

## 2022-05-02 DIAGNOSIS — E11.40 TYPE 2 DIABETES MELLITUS WITH DIABETIC NEUROPATHY, WITHOUT LONG-TERM CURRENT USE OF INSULIN (HCC): ICD-10-CM

## 2022-05-02 LAB
CREAT UR-MCNC: 54.1 MG/DL
MICROALBUMIN UR-MCNC: 410 MG/L (ref 0–20)
MICROALBUMIN/CREAT 24H UR: 758 MG/G CREATININE (ref 0–30)
SL AMB  POCT GLUCOSE, UA: 1000
SL AMB LEUKOCYTE ESTERASE,UA: NEGATIVE
SL AMB POCT BILIRUBIN,UA: NEGATIVE
SL AMB POCT BLOOD,UA: ABNORMAL
SL AMB POCT CLARITY,UA: CLEAR
SL AMB POCT COLOR,UA: YELLOW
SL AMB POCT HEMOGLOBIN AIC: 8.8 (ref ?–6.5)
SL AMB POCT KETONES,UA: NEGATIVE
SL AMB POCT NITRITE,UA: ABNORMAL
SL AMB POCT PH,UA: 6.5
SL AMB POCT SPECIFIC GRAVITY,UA: 1.02
SL AMB POCT URINE PROTEIN: 100
SL AMB POCT UROBILINOGEN: NEGATIVE

## 2022-05-02 PROCEDURE — 87077 CULTURE AEROBIC IDENTIFY: CPT | Performed by: FAMILY MEDICINE

## 2022-05-02 PROCEDURE — 87086 URINE CULTURE/COLONY COUNT: CPT | Performed by: FAMILY MEDICINE

## 2022-05-02 PROCEDURE — 3008F BODY MASS INDEX DOCD: CPT | Performed by: FAMILY MEDICINE

## 2022-05-02 PROCEDURE — 3080F DIAST BP >= 90 MM HG: CPT | Performed by: FAMILY MEDICINE

## 2022-05-02 PROCEDURE — 99214 OFFICE O/P EST MOD 30 MIN: CPT | Performed by: FAMILY MEDICINE

## 2022-05-02 PROCEDURE — 83036 HEMOGLOBIN GLYCOSYLATED A1C: CPT | Performed by: FAMILY MEDICINE

## 2022-05-02 PROCEDURE — 1036F TOBACCO NON-USER: CPT | Performed by: FAMILY MEDICINE

## 2022-05-02 PROCEDURE — 87186 SC STD MICRODIL/AGAR DIL: CPT | Performed by: FAMILY MEDICINE

## 2022-05-02 PROCEDURE — 1124F ACP DISCUSS-NO DSCNMKR DOCD: CPT | Performed by: FAMILY MEDICINE

## 2022-05-02 PROCEDURE — 3052F HG A1C>EQUAL 8.0%<EQUAL 9.0%: CPT | Performed by: FAMILY MEDICINE

## 2022-05-02 PROCEDURE — 82570 ASSAY OF URINE CREATININE: CPT | Performed by: FAMILY MEDICINE

## 2022-05-02 PROCEDURE — 81002 URINALYSIS NONAUTO W/O SCOPE: CPT | Performed by: FAMILY MEDICINE

## 2022-05-02 PROCEDURE — 3062F POS MACROALBUMINURIA REV: CPT | Performed by: FAMILY MEDICINE

## 2022-05-02 PROCEDURE — 82043 UR ALBUMIN QUANTITATIVE: CPT | Performed by: FAMILY MEDICINE

## 2022-05-02 PROCEDURE — 1160F RVW MEDS BY RX/DR IN RCRD: CPT | Performed by: FAMILY MEDICINE

## 2022-05-02 PROCEDURE — 3077F SYST BP >= 140 MM HG: CPT | Performed by: FAMILY MEDICINE

## 2022-05-02 RX ORDER — GLIMEPIRIDE 2 MG/1
2 TABLET ORAL 2 TIMES DAILY
Qty: 90 TABLET | Refills: 12 | Status: SHIPPED | OUTPATIENT
Start: 2022-05-02 | End: 2022-06-27 | Stop reason: SDUPTHER

## 2022-05-02 RX ORDER — DILTIAZEM HCL 90 MG
90 TABLET ORAL 2 TIMES DAILY
Qty: 60 TABLET | Refills: 3 | Status: SHIPPED | OUTPATIENT
Start: 2022-05-02 | End: 2022-06-27 | Stop reason: SDUPTHER

## 2022-05-02 RX ORDER — TAMSULOSIN HYDROCHLORIDE 0.4 MG/1
0.4 CAPSULE ORAL
Qty: 90 CAPSULE | Refills: 1 | Status: SHIPPED | OUTPATIENT
Start: 2022-05-02

## 2022-05-02 NOTE — ASSESSMENT & PLAN NOTE
Lab Results   Component Value Date    HGBA1C 8 5 (H) 10/20/2021     Elevated today  · A1c increased to 8 8%  · FBG - 140's  · Is currently glimiperide 2mg TID, metformin 500mg BID, januvia 100mg  · Patient would like to wait until his appointment with his PCP to decide what the next step in treatment is    · I encouraged patient to limit his intake of carbs

## 2022-05-02 NOTE — ASSESSMENT & PLAN NOTE
Waxes and wanes  · Occasional episodes of difficulty voiding, frequency, urgency, and decreased stream  · Previously discussed trying medications for bph but patient declined  · Is agreeable to start tamsulosin 0 4mg qhs  · Discussed side effects  · POCT urine dip: glucose and blood  · Follow up ua and culture  · Follow up PSA  · If symptoms are not improved after starting tamsulosin I recommend patient call his urologist to discuss next step in management

## 2022-05-02 NOTE — PROGRESS NOTES
Assessment/Plan:      1  Benign prostatic hyperplasia with weak urinary stream  Assessment & Plan:  Waxes and wanes  · Occasional episodes of difficulty voiding, frequency, urgency, and decreased stream  · Previously discussed trying medications for bph but patient declined  · Is agreeable to start tamsulosin 0 4mg qhs  · Discussed side effects  · POCT urine dip: glucose and blood  · Follow up ua and culture  · Follow up PSA  · If symptoms are not improved after starting tamsulosin I recommend patient call his urologist to discuss next step in management  Orders:  -     tamsulosin (FLOMAX) 0 4 mg; Take 1 capsule (0 4 mg total) by mouth daily with dinner  -     PSA Total, Diagnostic; Future  -     POCT urine dip  -     Urine culture  -     Microalbumin / creatinine urine ratio    2  Screening for colon cancer  -     Ambulatory referral for colonoscopy; Future; Expected date: 05/02/2022    3  Type 2 diabetes mellitus with diabetic neuropathy, without long-term current use of insulin ([facility])  Assessment & Plan:    Lab Results   Component Value Date    HGBA1C 8 5 (H) 10/20/2021     Elevated today  · A1c increased to 8 8%  · FBG - 140's  · Is currently glimiperide 2mg TID, metformin 500mg BID, januvia 100mg  · Patient would like to wait until his appointment with his PCP to decide what the next step in treatment is  · I encouraged patient to limit his intake of carbs    Orders:  -     POCT hemoglobin A1c  -     glimepiride (AMARYL) 2 mg tablet; Take 1 tablet (2 mg total) by mouth 2 (two) times a day  -     Microalbumin / creatinine urine ratio    4  Essential hypertension  -     diltiazem (CARDIZEM) 90 mg tablet; Take 1 tablet (90 mg total) by mouth 2 (two) times a day          Subjective:      Patient ID: Ana Martell  is a 81 y o  male presents today for evaluation of urinary troubles  He was seen by Dr Radha Myers in 11/2019  Patient has diagnosed BPH and was discussed starting medication vs urolift   Patient is traveling to [place] in 2 weeks and is worried this will impact his trip    HPI    The following portions of the patient's history were reviewed and updated as appropriate: allergies, current medications, past family history, past medical history, past social history, past surgical history and problem list     Review of Systems   Constitutional: Negative for activity change, chills, diaphoresis, fatigue and fever  HENT: Negative for congestion, ear pain, hearing loss, postnasal drip, rhinorrhea, sinus pressure, sinus pain, sneezing and sore throat  Eyes: Negative for visual disturbance  Respiratory: Negative for cough, chest tightness, shortness of breath and wheezing  Cardiovascular: Negative for chest pain, palpitations and leg swelling  Gastrointestinal: Negative for abdominal pain, blood in stool, constipation, diarrhea, nausea and vomiting  Genitourinary: Positive for difficulty urinating and frequency  Negative for decreased urine volume, dysuria, enuresis, flank pain, genital sores, hematuria, penile discharge, penile pain, penile swelling, scrotal swelling, testicular pain and urgency  Musculoskeletal: Negative for arthralgias, back pain, myalgias and neck pain  Neurological: Negative for dizziness, syncope, weakness, light-headedness, numbness and headaches  Objective:      /92 (BP Location: Left arm, Patient Position: Sitting, Cuff Size: Large)   Pulse (!) 108   Temp (!) 96 6 °F (35 9 °C) (Temporal)   Resp 20   Ht 6' 4" (1 93 m)   Wt 104 kg (229 lb 9 6 oz)   SpO2 99%   BMI 27 95 kg/m²          Physical Exam  Vitals reviewed  Constitutional:       General: He is not in acute distress  Appearance: He is well-developed  He is not diaphoretic  HENT:      Head: Normocephalic and atraumatic  Right Ear: External ear normal       Left Ear: External ear normal       Nose: Nose normal  No congestion        Mouth/Throat:      Mouth: Mucous membranes are moist  Pharynx: Oropharynx is clear  No oropharyngeal exudate  Eyes:      General: No scleral icterus  Pupils: Pupils are equal, round, and reactive to light  Neck:      Thyroid: No thyromegaly  Vascular: No JVD  Trachea: No tracheal deviation  Cardiovascular:      Rate and Rhythm: Normal rate and regular rhythm  Pulses: Normal pulses  Heart sounds: Normal heart sounds  No murmur heard  No friction rub  Pulmonary:      Effort: Pulmonary effort is normal  No respiratory distress  Breath sounds: Normal breath sounds  No wheezing or rales  Chest:      Chest wall: No tenderness  Abdominal:      General: Bowel sounds are normal  There is no distension  Palpations: Abdomen is soft  Tenderness: There is no abdominal tenderness  There is no right CVA tenderness, left CVA tenderness, guarding or rebound  Musculoskeletal:         General: No tenderness  Normal range of motion  Cervical back: Normal range of motion and neck supple  No tenderness  Right lower leg: No edema  Left lower leg: No edema  Lymphadenopathy:      Cervical: No cervical adenopathy  Skin:     General: Skin is warm and dry  Neurological:      Mental Status: He is alert and oriented to person, place, and time  Mental status is at baseline  Deep Tendon Reflexes: Reflexes are normal and symmetric  Psychiatric:         Mood and Affect: Mood normal          Behavior: Behavior normal          Thought Content:  Thought content normal          Judgment: Judgment normal

## 2022-05-04 DIAGNOSIS — N39.0 URINARY TRACT INFECTION WITHOUT HEMATURIA, SITE UNSPECIFIED: Primary | ICD-10-CM

## 2022-05-04 RX ORDER — SULFAMETHOXAZOLE AND TRIMETHOPRIM 800; 160 MG/1; MG/1
1 TABLET ORAL 2 TIMES DAILY
Qty: 6 TABLET | Refills: 0 | Status: SHIPPED | OUTPATIENT
Start: 2022-05-04 | End: 2022-05-05

## 2022-05-05 DIAGNOSIS — N39.0 URINARY TRACT INFECTION WITHOUT HEMATURIA, SITE UNSPECIFIED: Primary | ICD-10-CM

## 2022-05-05 LAB — BACTERIA UR CULT: ABNORMAL

## 2022-05-05 RX ORDER — NITROFURANTOIN 25; 75 MG/1; MG/1
100 CAPSULE ORAL 2 TIMES DAILY
Qty: 10 CAPSULE | Refills: 0 | Status: SHIPPED | OUTPATIENT
Start: 2022-05-05 | End: 2022-05-10

## 2022-06-01 ENCOUNTER — TELEPHONE (OUTPATIENT)
Dept: PODIATRY | Facility: CLINIC | Age: 72
End: 2022-06-01

## 2022-06-01 NOTE — TELEPHONE ENCOUNTER
Patient wife called  They are being charged for a second pair of shoes, by   The first pair is not molded  And cannot be used  Insurance will not cover the second pair, which are the molded ones he needs  Please advise  Thank you

## 2022-06-01 NOTE — TELEPHONE ENCOUNTER
Pt's wife upset that the first pair of diabetic shoes from 2/22 were covered but were "off the rack" shoes and not custom  Now pt needs custom molded diabetic shoes and insurance will not cover the shoes because they covered the shoes in 2/22  Wife states the Rx written in 2/22 was incorrect which caused them to get shoes that do not help the pt  Wife states they should not have to pay for the new custom shoes since the RX was written wrong  Asked wife to speak with Ani and have them reach out to podiatry to request notes and obtain authorization if possible  Jeanine Molina understanding and will have Ani contact  the office

## 2022-06-03 ENCOUNTER — TELEPHONE (OUTPATIENT)
Dept: PODIATRY | Facility: CLINIC | Age: 72
End: 2022-06-03

## 2022-06-03 ENCOUNTER — RA CDI HCC (OUTPATIENT)
Dept: OTHER | Facility: HOSPITAL | Age: 72
End: 2022-06-03

## 2022-06-03 NOTE — TELEPHONE ENCOUNTER
Suzan Dickerson from [employer] called  This patient received off the shelf shoes  They are not working out  Insurance will now not pay for custom molded shoes which the patient now needs  Suzan Dickerson did reach out to the office staff to request (originally) the custom shoes  Can we reach out to the insurance company to request payment for the new custom shoes?

## 2022-06-03 NOTE — PROGRESS NOTES
[id number]  [facility]  coding opportunities          Chart Reviewed number of suggestions sent to Provider: 1     Patients Insurance     Medicare Insurance: [employer]

## 2022-06-07 ENCOUNTER — TELEPHONE (OUTPATIENT)
Dept: PODIATRY | Facility: CLINIC | Age: 72
End: 2022-06-07

## 2022-06-07 PROBLEM — M86.171 ACUTE OSTEOMYELITIS OF RIGHT ANKLE OR FOOT (HCC): Status: RESOLVED | Noted: 2021-05-04 | Resolved: 2022-06-07

## 2022-06-07 NOTE — PATIENT INSTRUCTIONS
Foot Care for People with Diabetes   WHAT YOU NEED TO KNOW:   Foot care helps protect your feet and prevent foot ulcers or sores  Long-term high blood sugar levels can damage the blood vessels and nerves in your legs and feet  This damage makes it hard to feel pressure, pain, temperature, and touch  You may not be able to feel a cut or sore, or shoes that are too tight  Foot care is needed to prevent serious problems, such as an infection or amputation  Diabetes may cause your toes to become crooked or curved under  These changes may affect the way you walk and can lead to increased pressure on your foot  The pressure can decrease blood flow to your feet  Lack of blood flow increases your risk for a foot ulcer  Do not ignore small problems, such as dry skin or small wounds  These can become life-threatening over time without proper care  DISCHARGE INSTRUCTIONS:   Call your care team provider if:   Your feet become numb, weak, or hard to move  You have pus draining from a sore on your foot  You have a wound on your foot that gets bigger, deeper, or does not heal      You see blisters, cuts, scratches, calluses, or sores on your foot  You have a fever, and your feet become red, warm, and swollen  Your toenails become thick, curled, or yellow  You find it hard to check your feet because your vision is poor  You have questions or concerns about your condition or care  Foot care:   Check your feet each day  Look at your whole foot, including the bottom, and between and under your toes  Check for wounds, corns, and calluses  Use a mirror to see the bottom of your feet  The skin on your feet may be shiny, tight, or darker than normal  Your feet may also be cold and pale  Feel your feet by running your hands along the tops, bottoms, sides, and between your toes  Redness, swelling, and warmth are signs of blood flow problems that can lead to a foot ulcer   Do not try to remove corns or calluses yourself  Wash your feet each day with soap and warm water  Do not use hot water, because this can injure your foot  Dry your feet gently with a towel after you wash them  Dry between and under your toes  Apply lotion or a moisturizer on your dry feet  Ask your care team provider what lotions are best to use  Do not put lotion or moisturizer between your toes  Moisture between your toes could lead to skin breakdown  Cut your toenails correctly  File or cut your toenails straight across  Use a soft brush to clean around your toenails  If your toenails are very thick, you may need to have a care team provider or specialist cut them  Protect your feet  Do not walk barefoot or wear your shoes without socks  Check your shoes for rocks or other objects that can hurt your feet  Wear cotton socks to help keep your feet dry  Wear socks without toe seams, or wear them with the seams inside out  Change your socks each day  Do not wear socks that are dirty or damp  Wear shoes that fit well  Wear shoes that do not rub against any area of your feet  Your shoes should be ½ to ¾ inch (1 to 2 centimeters) longer than your feet  Your shoes should also have extra space around the widest part of your feet  Walking or athletic shoes with laces or straps that adjust are best  Ask your care team provider for help to choose shoes that fit you best  Ask him or her if you need to wear an insert, orthotic, or bandage on your feet  Do not smoke  Smoking can damage your blood vessels and put you at increased risk for foot ulcers  Ask your care team provider for information if you currently smoke and need help to quit  E-cigarettes or smokeless tobacco still contain nicotine  Talk to your care team provider before you use these products  Follow up with your diabetes care team provider or foot specialist as directed: You will need to have your feet checked at least once a year   You may need a foot exam more often if you have nerve damage, foot deformities, or ulcers  Write down your questions so you remember to ask them during your visits  © Copyright [employer] 2022 Information is for End User's use only and may not be sold, redistributed or otherwise used for commercial purposes  All illustrations and images included in CareNotes® are the copyrighted property of [employer]  or [facility] [place][note 2] Brittanie Blunt   The above information is an  only  It is not intended as medical advice for individual conditions or treatments  Talk to your doctor, nurse or pharmacist before following any medical regimen to see if it is safe and effective for you

## 2022-06-07 NOTE — TELEPHONE ENCOUNTER
Phone call to pt's wife to inform her that a letter of medical necessity and records will be faxed to insurance company to appeal the denial from custom molded shoes

## 2022-06-07 NOTE — PROGRESS NOTES
This patient was seen on 4/21/22  My role is Foot , Ankle, and Wound Specialist    SUBJECTIVE    Chief Complaint:  S/P foot amputation     Patient ID: Guille Boyce  is a 81 y o  male  Cortes Frame is here with his wife s/p complicated foot flap and amputation  He's now walking in shoe, no drainage from foot  He states the non-custom shoe isn't fitting correctly  The following portions of the patient's history were reviewed and updated as appropriate: allergies, current medications, past family history, past medical history, past social history, past surgical history and problem list     Review of Systems   Constitutional: Positive for activity change  Negative for appetite change, chills, diaphoresis, fatigue, fever and unexpected weight change  Skin: Negative for wound  OBJECTIVE      /92   Pulse 88   Wt 106 kg (233 lb)   BMI 28 36 kg/m²     Foot/Ankle Musculoskeletal Exam    General      Neurological: alert      General additional comments: Transmetatarsal amputation  Large bulky, healed ALT flap intact  No sinus tracts, ulcers, infections  Physical Exam  Vitals and nursing note reviewed  Constitutional:       General: He is not in acute distress  Appearance: Normal appearance  He is not ill-appearing, toxic-appearing or diaphoretic  Musculoskeletal:      Comments: Transmetatarsal amputation  Large bulky, healed ALT flap intact  No sinus tracts, ulcers, infections  Neurological:      Mental Status: He is alert               ASSESSMENT     Diagnoses and all orders for this visit:    Diabetic ulcer of right 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, with fat layer exposed ([facility] )  -     Diabetic Shoe    Type 2 diabetes mellitus with diabetic neuropathy, without long-term current use of insulin ([facility] )    Acquired absence of other right toe(s) [facility])    Aftercare for amputation stump    S/P transmetatarsal amputation of foot, right ([facility] )         Problem List Items Addressed This Visit        Endocrine    Type 2 diabetes mellitus with diabetic neuropathy (HCC) (Chronic)    Diabetic ulcer of right 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, with fat layer exposed ([facility] [facility] ) - Primary    Relevant Orders    Diabetic Shoe       Other    Acquired absence of other right toe(s) ([address] )    Aftercare for amputation stump    S/P transmetatarsal amputation of foot, right ([facility] )              PLAN    Rx for custom shoe given  High risk  foot precautions reviewed with patient including the need to wear protective shoegear at all times when walking including in the home, the need to check feet all surfaces daily with a mirror and report and skin breaks to podiatry, the need to apply an emollient to skin of feet daily

## 2022-06-07 NOTE — TELEPHONE ENCOUNTER
The first Rx given to pt was for "off the shelf shoes"  Pt received and they do not help  Pt needs custom molded shoes, new Rx was given but insurance denied to pay due to only paying for one pair of shoes per year  Phone call to insurance  To appeal the denial for the custom molded shoes Dr Alcides Burnette must write a letter of medical necessity and fax to the insurance co with medical records        Fax the letter and records to [phone]

## 2022-06-15 ENCOUNTER — TELEPHONE (OUTPATIENT)
Dept: GASTROENTEROLOGY | Facility: AMBULARY SURGERY CENTER | Age: 72
End: 2022-06-15

## 2022-06-15 ENCOUNTER — OFFICE VISIT (OUTPATIENT)
Dept: GASTROENTEROLOGY | Facility: AMBULARY SURGERY CENTER | Age: 72
End: 2022-06-15
Payer: COMMERCIAL

## 2022-06-15 VITALS
OXYGEN SATURATION: 97 % | WEIGHT: 233 LBS | HEART RATE: 70 BPM | BODY MASS INDEX: 27.51 KG/M2 | SYSTOLIC BLOOD PRESSURE: 130 MMHG | DIASTOLIC BLOOD PRESSURE: 70 MMHG | HEIGHT: 77 IN

## 2022-06-15 DIAGNOSIS — Z12.11 SCREENING FOR COLORECTAL CANCER: ICD-10-CM

## 2022-06-15 DIAGNOSIS — Z12.12 SCREENING FOR COLORECTAL CANCER: ICD-10-CM

## 2022-06-15 PROCEDURE — 99204 OFFICE O/P NEW MOD 45 MIN: CPT | Performed by: INTERNAL MEDICINE

## 2022-06-15 PROCEDURE — 3075F SYST BP GE 130 - 139MM HG: CPT | Performed by: INTERNAL MEDICINE

## 2022-06-15 PROCEDURE — 3078F DIAST BP <80 MM HG: CPT | Performed by: INTERNAL MEDICINE

## 2022-06-15 RX ORDER — SODIUM PICOSULFATE, MAGNESIUM OXIDE, AND ANHYDROUS CITRIC ACID 10; 3.5; 12 MG/160ML; G/160ML; G/160ML
1 LIQUID ORAL ONCE
Qty: 160 ML | Refills: 0 | Status: SHIPPED | OUTPATIENT
Start: 2022-06-15 | End: 2022-06-15

## 2022-06-15 NOTE — PROGRESS NOTES
Consultation - 126 [facility]  Orly Brown  81 y o  male MRN: [id number]          Assessment & Plan:    63-year-old gentleman, history of hypertension, diabetes, amputation feet here for colon cancer screening last examination 7 years ago    1  Colon cancer screening:   -we will schedule patient's colonoscopy  -discussed with him risks of procedure including bleeding, surgery, perforation, missed polyp detection rate    oLgan Ortiz was seen today for screening colonoscopy  Diagnoses and all orders for this visit:    Screening for colorectal cancer  -     Ambulatory referral to Gastroenterology  -     Colonoscopy; Future  -     Sod Picosulfate-Mag Ox-Cit Acd (Clenpiq) 10-3 5-12 MG-GM -GM/160ML SOLN; Take 1 kit by mouth 1 (one) time for 1 dose    Other orders  -     Diet NPO; Sips with meds; Standing  -     Void on call to OR; Standing            _____________________________________________________________        CC:  Colon cancer screening    HPI:  Orly Brown  is a 81 y o male who was referred for evaluation of colon cancer screening  63-year-old gentleman, history of hypertension, diabetes, atrial fibrillation on anticoagulation, has a history of congenital foot deformity status post multiple surgeries, recent toe amputations  Patient denies any significant GI complaints  Last colonoscopy was 6 or 7 years ago, was unremarkable  Patient denies any nausea, vomiting, heartburn, dysphagia, diarrhea, constipation, melena, rectal bleeding     Denies any alcohol tobacco   Previously worked in highway construction     Family history is notable for diabetes  ROS:  The remainder of the ROS was negative except for the pertinent positives mentioned in HPI           Allergies: Simvastatin and Itraconazole    Medications:   Current Outpatient Medications:     acetaminophen (Mapap Arthritis Pain) 650 mg CR tablet, Take 1 tablet (650 mg total) by mouth 2 (two) times a day, Disp: 60 tablet, Rfl: 1   atorvastatin (LIPITOR) 40 mg tablet, TAKE 1 TABLET (40 MG TOTAL) BY MOUTH DAILY, Disp: 90 tablet, Rfl: 2    atorvastatin (LIPITOR) 40 mg tablet, Take 1 tablet (40 mg total) by mouth daily, Disp: 90 tablet, Rfl: 3    diltiazem (CARDIZEM) 90 mg tablet, Take 1 tablet (90 mg total) by mouth 2 (two) times a day, Disp: 60 tablet, Rfl: 3    Eliquis 5 MG, TAKE 1 TABLET BY MOUTH TWICE A DAY, Disp: 60 tablet, Rfl: 12    glimepiride (AMARYL) 2 mg tablet, Take 1 tablet (2 mg total) by mouth 2 (two) times a day, Disp: 90 tablet, Rfl: 12    glucose blood (OneTouch Ultra) test strip, Use as instructed, Disp: 100 strip, Rfl: 3    glucose blood (OneTouch Verio) test strip, Use 1 each daily Use as instructed, Disp: 100 strip, Rfl: 3    Januvia 100 MG tablet, TAKE 1 TABLET (100 MG TOTAL) BY MOUTH DAILY, Disp: 132 tablet, Rfl: 2    Lancets (onetouch ultrasoft) lancets, Use daily, Disp: 100 each, Rfl: 3    Lancets (onetouch ultrasoft) lancets, Use as instructed, Disp: 100 each, Rfl: 3    lisinopril (ZESTRIL) 40 mg tablet, TAKE 1 TABLET (40 MG TOTAL) BY MOUTH DAILY, Disp: 90 tablet, Rfl: 3    metFORMIN (GLUCOPHAGE) 500 mg tablet, Take 1 tablet (500 mg total) by mouth 2 (two) times a day with meals, Disp: 180 tablet, Rfl: 6    metoprolol succinate (TOPROL-XL) 25 mg 24 hr tablet, TAKE (3) TABLETS DAILY BY MOUTH BY MOUTH TWICE A DAY, Disp: 540 tablet, Rfl: 6    Sod Picosulfate-Mag Ox-Cit Acd (Clenpiq) 10-3 5-12 MG-GM -GM/160ML SOLN, Take 1 kit by mouth 1 (one) time for 1 dose, Disp: 160 mL, Rfl: 0    tamsulosin (FLOMAX) 0 4 mg, Take 1 capsule (0 4 mg total) by mouth daily with dinner, Disp: 90 capsule, Rfl: 1'    Past Medical History:   Diagnosis Date    Arthritis     Atrial fibrillation ([facility] [address] )     Diagnosed 11/2018    Benign prostate hyperplasia 04/2002    Cellulitis     right lower leg     Colon polyp 2006    Diabetes mellitus ([facility]     Hearing aid worn     bilat    Hearing loss     90% loss left ear and 40% right ear  History of clubfoot     "since birth"    History of pneumonia     History of TIA (transient ischemic attack) 04/25/2017 11/30/18 pt denies    Hyperlipidemia 5/15/2014    Hypertension     Infectious viral hepatitis     "cant remember type"    Irregular heart beat     Afib    Neuropathy     both feet    Osteomyelitis [facility] [address] [facility]     right great toe    Pneumonia     Right middle lobe pulmonary nodule 11/6/2018    Seasickness     Teeth missing     Type 2 diabetes mellitus with diabetic neuropathy ([facility] [address] ) 11/6/2018    Wears glasses     reading    Wound, open     bottom of right foot       Past Surgical History:   Procedure Laterality Date    BUNIONECTOMY Right 12/4/2018    Procedure: 5TH METATARSAL BONE PARTIAL RESECTION, FULL THICKNESS DEBRIDEMENT OF DIABETIC ULCER;  Surgeon: Tomas Guzman DPM;  [facility];  Service: Podiatry    CLUB FOOT RELEASE Bilateral     COLONOSCOPY      COMPLEX WOUND CLOSURE TO EXTREMITY  4/27/2021    Procedure: COMPLEX WOUND CLOSURE TO EXTREMITY: RIGHT FOOT;  Surgeon: Raul Villatoro MD;  [facility];  Service: Plastics    EXTERNAL FIXATOR APPLICATION Right 0/58/7838    Procedure: Application multiplane external fixation;  Surgeon: Terri Gonzalez DPM;  [facility];  Service: Podiatry    FOOT HARDWARE REMOVAL Right 2/17/2021    Procedure: REMOVAL EXTERNAL FIXATOR;  Surgeon: Terri Gonzalez DPM;  [facility];  Service: Podiatry    FOREIGN BODY REMOVAL Right 4/27/2021    Procedure: REMOVAL FOREIGN BODY EXTREMITY: RIGHT FOOT;  Surgeon: Raul Villatoro MD;  [facility];  Service: Plastics    INCISION AND DRAINAGE OF WOUND Right 2/17/2021    Procedure: INCISION AND DRAINAGE (I&D) EXTREMITY;  Surgeon: Terri Gonzalez DPM;  [facility];  Service: Podiatry    ORIF FOOT FRACTURE Right 3/4/2021    Procedure: VAC PLACEMENT; SCREW FIXATION RIGHT FOOT FUSION;  Surgeon: Terri Gonzalez DPM;  [facility]; Service: [address] Right 2/22/2021    Procedure: AMPUTATION TRANSMETATARSAL (TMA), REMOVAL NAIL IM T2 ICF HARDWARE;  Surgeon: Gabo Wang DPM;  [facility];  Service: Podiatry    OH DEBRIDEMENT, SKIN, SUB-Q TISSUE,MUSCLE,BONE,=<20 SQ CM Right 12/2/2021    Procedure: DEBRIDEMENT OF TARSAL BONES WITH ANITBIODIC BEADS;  Surgeon: Gabo Wang DPM;  [facility];  Service: Podiatry    OH FUSION FOOT BONES,MIDTARSAL,OSTEOTMY Right 2/12/2021    Procedure: foot ARTHRODESIS/FUSION;  Surgeon: Gabo Wang DPM;  [facility];  Service: Podiatry    OH LENGTH/SHORT LEG/ANKL TENDON,SINGLE Right 2/12/2021    Procedure: LENGTHEN TIBIAL TENDON, TRANS HEEL CORD;  Surgeon: Gabo Wang DPM;  [facility];  Service: Podiatry    OH MUSCLE-SKIN FLAP,TRUNK Right 4/12/2021    Procedure: RECONSTRUCTION MICROSURGICAL W/ FREE FLAP;  Surgeon: Williams Sams MD;  [facility];  Service: Plastics    OH MUSCLE-SKIN FLAP,TRUNK Right 4/12/2021    Procedure: TAKEBACK RECONSTRUCTION MICROSURGICAL W/ FREE FLAP;  Surgeon: Williams Sams MD;  [facility];  Service: Plastics    OH MUSCLE-SKIN FLAP,TRUNK Right 4/13/2021    Procedure: RECONSTRUCTION MICROSURGICAL W/ FREE FLAP, RE EXPLORATION, MICRO VASCULAR REVISION;  Surgeon: Williams Sams MD;  [facility];  Service: Plastics    OH PART [address] [address] BONE METATARSAL HEAD,EA Right 12/29/2020    Procedure: EXCISION EXOSTOSIS;  Surgeon: Gabo Wang DPM;  [facility];  Service: [address]for[address] Gauss WND EXTEN/COMPLIC Right 30/89/4845    Procedure: PRIMARY DELAYED CLOSURE;  Surgeon: Gabo Wang DPM;  [facility];  Service: Podiatry    TOE AMPUTATION Right     partial great toe    TONSILLECTOMY      VAC DRESSING APPLICATION Right 2/0/1012    Procedure: APPLICATION VAC DRESSING EXTREMITY;  Surgeon: Williams Sams MD; [facility];  Service: Plastics    [address] Right 3/1/2021    Procedure: DEBRIDEMENT LOWER EXTREMITY [facility] OUT); Surgeon: Jose Rios MD;  [facility];  Service: Plastics    WOUND DEBRIDEMENT Right 4/7/2021    Procedure: DEBRIDEMENT RIGHT FOOT;  Surgeon: Jose Rios MD;  [facility];  Service: Plastics    WOUND DEBRIDEMENT Right 4/12/2021    Procedure: DEBRIDEMENT LOWER EXTREMITY [facility] OUT); Surgeon: Jose Rios MD;  [facility];  Service: Plastics    WOUND DEBRIDEMENT Right 4/27/2021    Procedure: DEBRIDEMENT LOWER EXTREMITY ([address]): RIGHT FOOT;  Surgeon: Jose Rios MD;  [facility];  Service: Plastics       Family History   Problem Relation Age of Onset    Diabetes Father         mellitus        reports that he quit smoking about 33 years ago  His smoking use included cigarettes  He has quit using smokeless tobacco  He reports current alcohol use  He reports previous drug use            Physical Exam:     /70 (BP Location: Left arm, Patient Position: Sitting, Cuff Size: Standard)   Pulse 70   Ht 6' 5" (1 956 m)   Wt 106 kg (233 lb)   SpO2 97%   BMI 27 63 kg/m²     Gen: wn/wd, NAD  HEENT: anicteric, MMM, no cervical LAD  CVS: RRR, no m/r/g  CHEST: CTA b/l  ABD: +BS, soft, NT,ND, no hepatosplenomegaly  EXT:  Bilateral scarring on feet and legs with deformity   NEURO: aaox3  SKIN: NO rashes

## 2022-06-15 NOTE — LETTER
Heidy 15, 2022     Johnny Mc, [address] [place] [id number]    Patient: Sukhdeep Abebe  YOB: 1950   Date of Visit: 6/15/2022       Dear Dr Lockett Bolus:    Thank you for referring Heather Hurtado to me for evaluation  Below are my notes for this consultation  If you have questions, please do not hesitate to call me  I look forward to following your patient along with you  Sincerely,        Nabeel Dejesus MD        CC: No Recipients  Nabeel Dejesus MD  6/15/2022  3:32 PM  Sign when Signing Visit  Consultation - 126 [facility]  Sukhdeep Abebe  81 y o  male MRN: [id number]          Assessment & Plan:    74-year-old gentleman, history of hypertension, diabetes, amputation feet here for colon cancer screening last examination 7 years ago    1  Colon cancer screening:   -we will schedule patient's colonoscopy  -discussed with him risks of procedure including bleeding, surgery, perforation, missed polyp detection rate    Symone Owens was seen today for screening colonoscopy  Diagnoses and all orders for this visit:    Screening for colorectal cancer  -     Ambulatory referral to Gastroenterology  -     Colonoscopy; Future  -     Sod Picosulfate-Mag Ox-Cit Acd (Clenpiq) 10-3 5-12 MG-GM -GM/160ML SOLN; Take 1 kit by mouth 1 (one) time for 1 dose    Other orders  -     Diet NPO; Sips with meds; Standing  -     Void on call to OR; Standing            _____________________________________________________________        CC:  Colon cancer screening    HPI:  Sukhdeep Abebe  is a 81 y o male who was referred for evaluation of colon cancer screening  74-year-old gentleman, history of hypertension, diabetes, atrial fibrillation on anticoagulation, has a history of congenital foot deformity status post multiple surgeries, recent toe amputations  Patient denies any significant GI complaints  Last colonoscopy was 6 or 7 years ago, was unremarkable    Patient denies any nausea, vomiting, heartburn, dysphagia, diarrhea, constipation, melena, rectal bleeding     Denies any alcohol tobacco   Previously worked in [employer]     Family history is notable for diabetes  ROS:  The remainder of the ROS was negative except for the pertinent positives mentioned in HPI           Allergies: Simvastatin and Itraconazole    Medications:   Current Outpatient Medications:     acetaminophen (Mapap Arthritis Pain) 650 mg CR tablet, Take 1 tablet (650 mg total) by mouth 2 (two) times a day, Disp: 60 tablet, Rfl: 1    atorvastatin (LIPITOR) 40 mg tablet, TAKE 1 TABLET (40 MG TOTAL) BY MOUTH DAILY, Disp: 90 tablet, Rfl: 2    atorvastatin (LIPITOR) 40 mg tablet, Take 1 tablet (40 mg total) by mouth daily, Disp: 90 tablet, Rfl: 3    diltiazem (CARDIZEM) 90 mg tablet, Take 1 tablet (90 mg total) by mouth 2 (two) times a day, Disp: 60 tablet, Rfl: 3    Eliquis 5 MG, TAKE 1 TABLET BY MOUTH TWICE A DAY, Disp: 60 tablet, Rfl: 12    glimepiride (AMARYL) 2 mg tablet, Take 1 tablet (2 mg total) by mouth 2 (two) times a day, Disp: 90 tablet, Rfl: 12    glucose blood (OneTouch Ultra) test strip, Use as instructed, Disp: 100 strip, Rfl: 3    glucose blood (OneTouch Verio) test strip, Use 1 each daily Use as instructed, Disp: 100 strip, Rfl: 3    Januvia 100 MG tablet, TAKE 1 TABLET (100 MG TOTAL) BY MOUTH DAILY, Disp: 132 tablet, Rfl: 2    Lancets (onetouch ultrasoft) lancets, Use daily, Disp: 100 each, Rfl: 3    Lancets (onetouch ultrasoft) lancets, Use as instructed, Disp: 100 each, Rfl: 3    lisinopril (ZESTRIL) 40 mg tablet, TAKE 1 TABLET (40 MG TOTAL) BY MOUTH DAILY, Disp: 90 tablet, Rfl: 3    metFORMIN (GLUCOPHAGE) 500 mg tablet, Take 1 tablet (500 mg total) by mouth 2 (two) times a day with meals, Disp: 180 tablet, Rfl: 6    metoprolol succinate (TOPROL-XL) 25 mg 24 hr tablet, TAKE (3) TABLETS DAILY BY MOUTH BY MOUTH TWICE A DAY, Disp: 540 tablet, Rfl: 6    Sod Picosulfate-Mag Ox-Cit Acd (Clenpiq) 10-3 5-12 MG-GM -GM/160ML SOLN, Take 1 kit by mouth 1 (one) time for 1 dose, Disp: 160 mL, Rfl: 0    tamsulosin (FLOMAX) 0 4 mg, Take 1 capsule (0 4 mg total) by mouth daily with dinner, Disp: 90 capsule, Rfl: 1'    Past Medical History:   Diagnosis Date    Arthritis     Atrial fibrillation ([facility] [address] )     Diagnosed 11/2018    Benign prostate hyperplasia 04/2002    Cellulitis     right lower leg     Colon polyp 2006    Diabetes mellitus ([facility] [address] [facility]     Hearing aid worn     bilat    Hearing loss     90% loss left ear and 40% right ear    History of clubfoot     "since birth"    History of pneumonia     History of TIA (transient ischemic attack) 04/25/2017 11/30/18 pt denies    Hyperlipidemia 5/15/2014    Hypertension     Infectious viral hepatitis     "cant remember type"    Irregular heart beat     Afib    Neuropathy     both feet    Osteomyelitis ([facility] [address] [facility]     right great toe    Pneumonia     Right middle lobe pulmonary nodule 11/6/2018    Seasickness     Teeth missing     Type 2 diabetes mellitus with diabetic neuropathy [facility] 11/6/2018    Wears glasses     reading    Wound, open     bottom of right foot       Past Surgical History:   Procedure Laterality Date    BUNIONECTOMY Right 12/4/2018    Procedure: 5TH METATARSAL BONE PARTIAL RESECTION, FULL THICKNESS DEBRIDEMENT OF DIABETIC ULCER;  Surgeon: Danay Brewer DPM;  [facility];  Service: Podiatry    CLUB FOOT RELEASE Bilateral     COLONOSCOPY      COMPLEX WOUND CLOSURE TO EXTREMITY  4/27/2021    Procedure: COMPLEX WOUND CLOSURE TO EXTREMITY: RIGHT FOOT;  Surgeon: Soumya Vernon MD;  [facility];  Service: Plastics    EXTERNAL FIXATOR APPLICATION Right 7/36/1445    Procedure: Application multiplane external fixation;  Surgeon: Kayla Tena DPM;  [facility];  Service: Podiatry    FOOT HARDWARE REMOVAL Right 2/17/2021    Procedure: REMOVAL EXTERNAL FIXATOR;  Surgeon: Kayla Tena DPM;  [facility]; Service: Podiatry    FOREIGN BODY REMOVAL Right 4/27/2021    Procedure: REMOVAL FOREIGN BODY EXTREMITY: RIGHT FOOT;  Surgeon: Elysia Maria MD;  [facility];  Service: Plastics    INCISION AND DRAINAGE OF WOUND Right 2/17/2021    Procedure: INCISION AND DRAINAGE (I&D) EXTREMITY;  Surgeon: Fox Cool DPM;  [facility];  Service: Podiatry    ORIF FOOT FRACTURE Right 3/4/2021    Procedure: VAC PLACEMENT; SCREW FIXATION RIGHT FOOT FUSION;  Surgeon: Fox Cool DPM;  [facility];  Service: Podiatry    [address] [facility] Right 2/22/2021    Procedure: AMPUTATION TRANSMETATARSAL (TMA), REMOVAL NAIL IM T2 ICF HARDWARE;  Surgeon: Fox Cool DPM;  [facility];  Service: Podiatry    VA DEBRIDEMENT, SKIN, SUB-Q TISSUE,MUSCLE,BONE,=<20 SQ CM Right 12/2/2021    Procedure: DEBRIDEMENT OF TARSAL BONES WITH ANITBIODIC BEADS;  Surgeon: Fox Cool DPM;  [facility];  Service: Podiatry    VA FUSION FOOT BONES,MIDTARSAL,OSTEOTMY Right 2/12/2021    Procedure: foot ARTHRODESIS/FUSION;  Surgeon: Fox Cool DPM;  [facility];  Service: Podiatry    VA LENGTH/SHORT LEG/ANKL TENDON,SINGLE Right 2/12/2021    Procedure: LENGTHEN TIBIAL TENDON, TRANS HEEL CORD;  Surgeon: Fox Cool DPM;  [facility];  Service: Podiatry    VA MUSCLE-SKIN FLAP,TRUNK Right 4/12/2021    Procedure: RECONSTRUCTION MICROSURGICAL W/ FREE FLAP;  Surgeon: Elysia Maria MD;  [facility];  Service: Plastics    VA MUSCLE-SKIN FLAP,TRUNK Right 4/12/2021    Procedure: TAKEBACK RECONSTRUCTION MICROSURGICAL W/ FREE FLAP;  Surgeon: Elysia Maria MD;  [facility];  Service: Plastics    VA MUSCLE-SKIN FLAP,TRUNK Right 4/13/2021    Procedure: RECONSTRUCTION MICROSURGICAL W/ FREE FLAP, RE EXPLORATION, MICRO VASCULAR REVISION;  Surgeon: Elysia Maria MD;  [facility];  Service: Plastics    VA PART [address] [facility] BONE METATARSAL HEAD,EA Right 12/29/2020    Procedure: EXCISION EXOSTOSIS;  Surgeon: Katarina Fernandez DPM;  [facility];  Service: [address] WND EXTEN/COMPLIC Right 74/75/7017    Procedure: PRIMARY DELAYED CLOSURE;  Surgeon: Katarina Fernandez DPM;  [facility];  Service: Podiatry    TOE AMPUTATION Right     partial great toe    TONSILLECTOMY      VAC DRESSING APPLICATION Right 4/5/9909    Procedure: APPLICATION VAC DRESSING EXTREMITY;  Surgeon: Claudia Vazquez MD;  [facility];  Service: Plastics    [address] Right 3/1/2021    Procedure: DEBRIDEMENT LOWER EXTREMITY ([address]); Surgeon: Claudia Vazquez MD;  [facility];  Service: Plastics    WOUND DEBRIDEMENT Right 4/7/2021    Procedure: DEBRIDEMENT RIGHT FOOT;  Surgeon: Claudia Vazquez MD;  [facility];  Service: Plastics    WOUND DEBRIDEMENT Right 4/12/2021    Procedure: DEBRIDEMENT LOWER EXTREMITY [facility] OUT); Surgeon: Claudia Vazquez MD;  [facility];  Service: Plastics    WOUND DEBRIDEMENT Right 4/27/2021    Procedure: DEBRIDEMENT LOWER EXTREMITY ([address]): RIGHT FOOT;  Surgeon: Claudia Vazquez MD;  [facility];  Service: Plastics       Family History   Problem Relation Age of Onset    Diabetes Father         mellitus        reports that he quit smoking about 33 years ago  His smoking use included cigarettes  He has quit using smokeless tobacco  He reports current alcohol use  He reports previous drug use            Physical Exam:     /70 (BP Location: Left arm, Patient Position: Sitting, Cuff Size: Standard)   Pulse 70   Ht 6' 5" (1 956 m)   Wt 106 kg (233 lb)   SpO2 97%   BMI 27 63 kg/m²     Gen: wn/wd, NAD  HEENT: anicteric, MMM, no cervical LAD  CVS: RRR, no m/r/g  CHEST: CTA b/l  ABD: +BS, soft, NT,ND, no hepatosplenomegaly  EXT:  Bilateral scarring on feet and legs with deformity   NEURO: aaox3  SKIN: NO rashes

## 2022-06-15 NOTE — TELEPHONE ENCOUNTER
Our mutual patient is scheduled for procedure: colonoscopy    On: September 1 , 2022     With: Dr Sho Pillai MD    He/She is taking the following blood thinner: Eliquis (Apixaban)    Can this be stopped  2 days prior to the procedure    Physician Approving clearance:  Dr Lazarus Richardson

## 2022-06-15 NOTE — TELEPHONE ENCOUNTER
Patient is scheduled for colonoscopy on September 1 , 2022 at [facility]  with Bridget Dejesus MD  Patient is aware of pre-procedure prep of Clenpiq and they will be called the day prior between 2 and 6 pm for time to report for procedure  Pre-procedure prep has been given to the patient  in person  on Heidy 15 , 2022

## 2022-06-16 NOTE — TELEPHONE ENCOUNTER
Lmom to notify patient is is ok to hold Eliquis 2 days prior to colonoscopy on 9/1/2022 with Dr Miguelangel Daniel [facility]

## 2022-06-17 ENCOUNTER — TELEPHONE (OUTPATIENT)
Dept: PODIATRY | Facility: CLINIC | Age: 72
End: 2022-06-17

## 2022-06-17 NOTE — TELEPHONE ENCOUNTER
I tried to explain that we did all we can to help him with this issue and it is in the hands of his insurance company now to approve or deny  He was calling them after he ended call with me, just wanted you to be aware

## 2022-06-24 DIAGNOSIS — I48.91 ATRIAL FIBRILLATION, UNSPECIFIED TYPE (HCC): ICD-10-CM

## 2022-06-24 RX ORDER — SODIUM PICOSULFATE, MAGNESIUM OXIDE, AND ANHYDROUS CITRIC ACID 10; 3.5; 12 MG/160ML; G/160ML; G/160ML
LIQUID ORAL
COMMUNITY
Start: 2022-06-15

## 2022-06-24 RX ORDER — APIXABAN 5 MG/1
TABLET, FILM COATED ORAL
Qty: 60 TABLET | Refills: 11 | Status: SHIPPED | OUTPATIENT
Start: 2022-06-24

## 2022-06-27 ENCOUNTER — OFFICE VISIT (OUTPATIENT)
Dept: FAMILY MEDICINE CLINIC | Facility: CLINIC | Age: 72
End: 2022-06-27
Payer: COMMERCIAL

## 2022-06-27 VITALS
WEIGHT: 233.8 LBS | OXYGEN SATURATION: 98 % | HEIGHT: 77 IN | TEMPERATURE: 97.5 F | RESPIRATION RATE: 18 BRPM | SYSTOLIC BLOOD PRESSURE: 164 MMHG | BODY MASS INDEX: 27.61 KG/M2 | HEART RATE: 88 BPM | DIASTOLIC BLOOD PRESSURE: 102 MMHG

## 2022-06-27 DIAGNOSIS — E11.40 TYPE 2 DIABETES MELLITUS WITH DIABETIC NEUROPATHY, WITHOUT LONG-TERM CURRENT USE OF INSULIN (HCC): ICD-10-CM

## 2022-06-27 DIAGNOSIS — I10 ESSENTIAL HYPERTENSION: ICD-10-CM

## 2022-06-27 DIAGNOSIS — T81.32XD DEEP DISRUPTION OR DEHISCENCE OF OPERATION WOUND, SUBSEQUENT ENCOUNTER: ICD-10-CM

## 2022-06-27 DIAGNOSIS — Z00.00 MEDICARE ANNUAL WELLNESS VISIT, SUBSEQUENT: Primary | ICD-10-CM

## 2022-06-27 DIAGNOSIS — Z89.421 ACQUIRED ABSENCE OF OTHER RIGHT TOE(S) (HCC): ICD-10-CM

## 2022-06-27 DIAGNOSIS — E78.5 HYPERLIPIDEMIA, UNSPECIFIED HYPERLIPIDEMIA TYPE: Chronic | ICD-10-CM

## 2022-06-27 DIAGNOSIS — I48.91 ATRIAL FIBRILLATION WITH RVR (HCC): ICD-10-CM

## 2022-06-27 DIAGNOSIS — R80.9 POSITIVE FOR MICROALBUMINURIA: Chronic | ICD-10-CM

## 2022-06-27 PROBLEM — T81.32XA DEEP DISRUPTION OR DEHISCENCE OF OPERATION WOUND: Status: RESOLVED | Noted: 2021-01-15 | Resolved: 2022-06-27

## 2022-06-27 PROBLEM — Z98.890 POST-OPERATIVE STATE: Status: RESOLVED | Noted: 2021-02-13 | Resolved: 2022-06-27

## 2022-06-27 PROBLEM — T81.329A DEEP DISRUPTION OR DEHISCENCE OF OPERATION WOUND: Status: RESOLVED | Noted: 2021-01-15 | Resolved: 2022-06-27

## 2022-06-27 PROCEDURE — 3066F NEPHROPATHY DOC TX: CPT | Performed by: FAMILY MEDICINE

## 2022-06-27 PROCEDURE — 1125F AMNT PAIN NOTED PAIN PRSNT: CPT | Performed by: FAMILY MEDICINE

## 2022-06-27 PROCEDURE — 1160F RVW MEDS BY RX/DR IN RCRD: CPT | Performed by: FAMILY MEDICINE

## 2022-06-27 PROCEDURE — 1101F PT FALLS ASSESS-DOCD LE1/YR: CPT | Performed by: FAMILY MEDICINE

## 2022-06-27 PROCEDURE — 1170F FXNL STATUS ASSESSED: CPT | Performed by: FAMILY MEDICINE

## 2022-06-27 PROCEDURE — 3725F SCREEN DEPRESSION PERFORMED: CPT | Performed by: FAMILY MEDICINE

## 2022-06-27 PROCEDURE — 3008F BODY MASS INDEX DOCD: CPT | Performed by: FAMILY MEDICINE

## 2022-06-27 PROCEDURE — G0439 PPPS, SUBSEQ VISIT: HCPCS | Performed by: FAMILY MEDICINE

## 2022-06-27 PROCEDURE — 99214 OFFICE O/P EST MOD 30 MIN: CPT | Performed by: FAMILY MEDICINE

## 2022-06-27 PROCEDURE — 1036F TOBACCO NON-USER: CPT | Performed by: FAMILY MEDICINE

## 2022-06-27 PROCEDURE — 3288F FALL RISK ASSESSMENT DOCD: CPT | Performed by: FAMILY MEDICINE

## 2022-06-27 RX ORDER — GLIMEPIRIDE 2 MG/1
TABLET ORAL
Qty: 135 TABLET | Refills: 6 | Status: SHIPPED | OUTPATIENT
Start: 2022-06-27

## 2022-06-27 RX ORDER — DILTIAZEM HCL 90 MG
90 TABLET ORAL 2 TIMES DAILY
Qty: 180 TABLET | Refills: 6 | Status: SHIPPED | OUTPATIENT
Start: 2022-06-27

## 2022-06-27 NOTE — PROGRESS NOTES
Assessment and Plan:     Problem List Items Addressed This Visit    None     Visit Diagnoses     Medicare annual wellness visit, subsequent    -  Primary           Preventive health issues were discussed with patient, and age appropriate screening tests were ordered as noted in patient's After Visit Summary  Personalized health advice and appropriate referrals for health education or preventive services given if needed, as noted in patient's After Visit Summary       History of Present Illness:     Patient presents for a Medicare Wellness Visit    HPI   Patient Care Team:  Chanel Godinez MD as PCP - General (Family Medicine)  Robin Pastrana DPM as PCP - Wound (Podiatry)     Review of Systems:     Review of Systems     Problem List:     Patient Active Problem List   Diagnosis    Hyperlipidemia    Type 2 diabetes mellitus with diabetic neuropathy [facility]    Atrial fibrillation with RVR ([facility] )    Right middle lobe pulmonary nodule    Essential hypertension    Acquired absence of other right toe(s) ([facility] )    Benign prostatic hyperplasia with lower urinary tract symptoms    Positive for microalbuminuria    Deep disruption or dehiscence of operation wound    Post-operative state    Aftercare for amputation stump    Ambulatory dysfunction    Diabetic ulcer of right 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, with fat layer exposed ([facility] )    S/P transmetatarsal amputation of foot, right ([facility] )    Sleep disturbance    Anxiety    S/P R ALT flap to right foot    COVID-19      Past Medical and Surgical History:     Past Medical History:   Diagnosis Date    Arthritis     Atrial fibrillation ([facility]     Diagnosed 11/2018    Benign prostate hyperplasia 04/2002    Cellulitis     right lower leg     Colon polyp 2006    Diabetes mellitus ([facility]     Hearing aid worn     bilat    Hearing loss     90% loss left ear and 40% right ear    History of clubfoot     "since birth"    History of pneumonia     History of TIA (transient ischemic attack) 04/25/2017 11/30/18 pt denies    Hyperlipidemia 5/15/2014    Hypertension     Infectious viral hepatitis     "cant remember type"    Irregular heart beat     Afib    Neuropathy     both feet    Osteomyelitis ([facility]     right great toe    Pneumonia     Right middle lobe pulmonary nodule 11/6/2018    Seasickness     Teeth missing     Type 2 diabetes mellitus with diabetic neuropathy ([facility] ) 11/6/2018    Wears glasses     reading    Wound, open     bottom of right foot     Past Surgical History:   Procedure Laterality Date    BUNIONECTOMY Right 12/4/2018    Procedure: 5TH METATARSAL BONE PARTIAL RESECTION, FULL THICKNESS DEBRIDEMENT OF DIABETIC ULCER;  Surgeon: Jeff Juarez DPM;  [facility];  Service: Podiatry    CLUB FOOT RELEASE Bilateral     COLONOSCOPY      COMPLEX WOUND CLOSURE TO EXTREMITY  4/27/2021    Procedure: COMPLEX WOUND CLOSURE TO EXTREMITY: RIGHT FOOT;  Surgeon: Madeleine Mcnamara MD;  [facility];  Service: Plastics    EXTERNAL FIXATOR APPLICATION Right 3/48/3986    Procedure: Application multiplane external fixation;  Surgeon: Esther Gonzalez DPM;  [facility];  Service: Podiatry    FOOT HARDWARE REMOVAL Right 2/17/2021    Procedure: REMOVAL EXTERNAL FIXATOR;  Surgeon: Esther Gonzalez DPM;  [facility];  Service: Podiatry    FOREIGN BODY REMOVAL Right 4/27/2021    Procedure: REMOVAL FOREIGN BODY EXTREMITY: RIGHT FOOT;  Surgeon: Madeleine Mcnamara MD;  [facility];  Service: Plastics    INCISION AND DRAINAGE OF WOUND Right 2/17/2021    Procedure: INCISION AND DRAINAGE (I&D) EXTREMITY;  Surgeon: Esther Gonzalez DPM;  [facility];  Service: Podiatry    ORIF FOOT FRACTURE Right 3/4/2021    Procedure: VAC PLACEMENT; SCREW FIXATION RIGHT FOOT FUSION;  Surgeon: Esther Gonzalez DPM;  [facility];  Service: [address] Right 2/22/2021 Procedure: AMPUTATION TRANSMETATARSAL (TMA), REMOVAL NAIL IM T2 ICF HARDWARE;  Surgeon: Mildred Pearce DPM;  [facility];  Service: Podiatry    OK DEBRIDEMENT, SKIN, SUB-Q TISSUE,MUSCLE,BONE,=<20 SQ CM Right 12/2/2021    Procedure: DEBRIDEMENT OF TARSAL BONES WITH ANITBIODIC BEADS;  Surgeon: Mildred Pearce DPM;  [facility];  Service: Podiatry    OK FUSION FOOT BONES,MIDTARSAL,OSTEOTMY Right 2/12/2021    Procedure: foot ARTHRODESIS/FUSION;  Surgeon: Mildred Pearce DPM;  [facility];  Service: Podiatry    OK LENGTH/SHORT LEG/ANKL TENDON,SINGLE Right 2/12/2021    Procedure: LENGTHEN TIBIAL TENDON, TRANS HEEL CORD;  Surgeon: Mildred Pearce DPM;  [facility];  Service: Podiatry    OK MUSCLE-SKIN FLAP,TRUNK Right 4/12/2021    Procedure: RECONSTRUCTION MICROSURGICAL W/ FREE FLAP;  Surgeon: Chary Montemayor MD;  [facility];  Service: Plastics    OK MUSCLE-SKIN FLAP,TRUNK Right 4/12/2021    Procedure: TAKEBACK RECONSTRUCTION MICROSURGICAL W/ FREE FLAP;  Surgeon: Chary Montemayor MD;  [facility];  Service: Plastics    OK MUSCLE-SKIN FLAP,TRUNK Right 4/13/2021    Procedure: RECONSTRUCTION MICROSURGICAL W/ FREE FLAP, RE EXPLORATION, MICRO VASCULAR REVISION;  Surgeon: Chary Montemayor MD;  [facility];  Service: Plastics    OK PART [address] BONE METATARSAL HEAD,EA Right 12/29/2020    Procedure: EXCISION EXOSTOSIS;  Surgeon: Mildred Pearce DPM;  [facility];  Service: [facility] SURG WND EXTEN/COMPLIC Right 80/07/4215    Procedure: PRIMARY DELAYED CLOSURE;  Surgeon: Mildred Pearce DPM;  [facility];  Service: Podiatry    TOE AMPUTATION Right     partial great toe    TONSILLECTOMY      VAC DRESSING APPLICATION Right 5/4/9773    Procedure: APPLICATION VAC DRESSING EXTREMITY;  Surgeon: Chary Montemayor MD;  [facility];  Service: [address] [place] [address] DEBRIDEMENT Right 3/1/2021    Procedure: DEBRIDEMENT LOWER EXTREMITY [facility] OUT); Surgeon: Shaggy Forbes MD;  [facility];  Service: Plastics    WOUND DEBRIDEMENT Right 2021    Procedure: DEBRIDEMENT RIGHT FOOT;  Surgeon: Shaggy Forbes MD;  [facility];  Service: Plastics    WOUND DEBRIDEMENT Right 2021    Procedure: DEBRIDEMENT LOWER EXTREMITY [facility] OUT);   Surgeon: Shaggy Forbes MD;  [facility];  Service: Plastics    WOUND DEBRIDEMENT Right 2021    Procedure: DEBRIDEMENT LOWER EXTREMITY [facility] OUT): RIGHT FOOT;  Surgeon: Shaggy Forbes MD;  [facility];  Service: Plastics      Family History:     Family History   Problem Relation Age of Onset    Diabetes Father         mellitus      Social History:     Social History     Socioeconomic History    Marital status: /Civil Union     Spouse name: None    Number of children: None    Years of education: None    Highest education level: None   Occupational History    None   Tobacco Use    Smoking status: Former Smoker     Types: Cigarettes     Quit date: 1988     Years since quittin 6    Smokeless tobacco: Former User    Tobacco comment: exposure to passive smoke   Vaping Use    Vaping Use: Never used   Substance and Sexual Activity    Alcohol use: Yes     Comment: occasionally    Drug use: Not Currently    Sexual activity: Yes     Partners: Female   Other Topics Concern    None   Social History Narrative    None     Social Determinants of Health     Financial Resource Strain: Not on file   Food Insecurity: Not on file   Transportation Needs: Not on file   Physical Activity: Not on file   Stress: Not on file   Social Connections: Not on file   Intimate Partner Violence: Not on file   Housing Stability: Not on file      Medications and Allergies:     Current Outpatient Medications   Medication Sig Dispense Refill    acetaminophen (Mapap Arthritis Pain) 650 mg CR tablet Take 1 tablet (650 mg total) by mouth 2 (two) times a day 60 tablet 1    atorvastatin (LIPITOR) 40 mg tablet TAKE 1 TABLET (40 MG TOTAL) BY MOUTH DAILY 90 tablet 2    atorvastatin (LIPITOR) 40 mg tablet Take 1 tablet (40 mg total) by mouth daily 90 tablet 3    Clenpiq 10-3 5-12 MG-GM -GM/160ML SOLN       diltiazem (CARDIZEM) 90 mg tablet Take 1 tablet (90 mg total) by mouth 2 (two) times a day 60 tablet 3    Eliquis 5 MG TAKE 1 TABLET BY MOUTH TWICE A DAY 60 tablet 11    glimepiride (AMARYL) 2 mg tablet Take 1 tablet (2 mg total) by mouth 2 (two) times a day 90 tablet 12    glucose blood (OneTouch Ultra) test strip Use as instructed 100 strip 3    glucose blood (OneTouch Verio) test strip Use 1 each daily Use as instructed 100 strip 3    Januvia 100 MG tablet TAKE 1 TABLET (100 MG TOTAL) BY MOUTH DAILY 132 tablet 2    Lancets (onetouch ultrasoft) lancets Use daily 100 each 3    Lancets (onetouch ultrasoft) lancets Use as instructed 100 each 3    lisinopril (ZESTRIL) 40 mg tablet TAKE 1 TABLET (40 MG TOTAL) BY MOUTH DAILY 90 tablet 3    metFORMIN (GLUCOPHAGE) 500 mg tablet Take 1 tablet (500 mg total) by mouth 2 (two) times a day with meals 180 tablet 6    metoprolol succinate (TOPROL-XL) 25 mg 24 hr tablet TAKE (3) TABLETS DAILY BY MOUTH BY MOUTH TWICE A  tablet 6    tamsulosin (FLOMAX) 0 4 mg Take 1 capsule (0 4 mg total) by mouth daily with dinner 90 capsule 1     No current facility-administered medications for this visit  Allergies   Allergen Reactions    Simvastatin Myalgia    Itraconazole Other (See Comments)     Pt denies knowledge of allergy          Immunizations:     Immunization History   Administered Date(s) Administered    COVID-19 PFIZER VACCINE 0 3 ML IM 05/06/2021, 05/27/2021, 12/21/2021    INFLUENZA 12/04/2014, 09/30/2016, 09/30/2016, 11/19/2018    Influenza Split 01/14/2011, 12/20/2011, 11/26/2013    Influenza, high dose seasonal 0 7 mL 09/05/2018, 09/03/2019, 12/23/2020, 01/17/2022    Influenza, seasonal, injectable 09/11/2009, 12/04/2014    Pneumococcal Conjugate 13-Valent 10/24/2017, 11/17/2017    Pneumococcal Polysaccharide PPV23 03/06/2007, 01/04/2019    Zoster 09/18/2015      Health Maintenance:         Topic Date Due    Colorectal Cancer Screening  12/07/2021    Hepatitis C Screening  Completed         Topic Date Due    DTaP,Tdap,and Td Vaccines (1 - Tdap) Never done    COVID-19 Vaccine (4 - Booster for Kemp Peter series) 04/21/2022      Medicare Screening Tests and Risk Assessments:     Con Menchaca is here for his Subsequent Wellness visit  Health Risk Assessment:   Patient rates overall health as good  Patient feels that their physical health rating is same  Patient is satisfied with their life  Eyesight was rated as same  Hearing was rated as same  Patient feels that their emotional and mental health rating is same  Patients states they are never, rarely angry  Patient states they are sometimes unusually tired/fatigued  Pain experienced in the last 7 days has been some  Patient states that he has experienced no weight loss or gain in last 6 months  Depression Screening:   PHQ-2 Score: 0      Fall Risk Screening: In the past year, patient has experienced: no history of falling in past year      Home Safety:  Patient has trouble with stairs inside or outside of their home  Patient has working smoke alarms and has working carbon monoxide detector  Home safety hazards include: none  Nutrition:   Current diet is Regular  Medications:   Patient is currently taking over-the-counter supplements  OTC medications include: see medication list  Patient is able to manage medications  Activities of Daily Living (ADLs)/Instrumental Activities of Daily Living (IADLs):   Walk and transfer into and out of bed and chair?: Yes  Dress and groom yourself?: Yes    Bathe or shower yourself?: Yes    Feed yourself?  Yes  Do your laundry/housekeeping?: Yes  Manage your money, pay your bills and track your expenses?: Yes  Make your own meals?: Yes    Do your own shopping?: Yes    Previous Hospitalizations:   Any hospitalizations or ED visits within the last 12 months?: No      Advance Care Planning:   Living will: Yes    Durable POA for healthcare: Yes    Advanced directive: Yes      Cognitive Screening:   Provider or family/friend/caregiver concerned regarding cognition?: No    PREVENTIVE SCREENINGS      Cardiovascular Screening:    General: Screening Not Indicated and History Lipid Disorder      Diabetes Screening:     General: Screening Not Indicated and History Diabetes      Colorectal Cancer Screening:     General: Screening Current      Prostate Cancer Screening:    General: Screening Not Indicated      Osteoporosis Screening:    General: Screening Not Indicated      Abdominal Aortic Aneurysm (AAA) Screening:    Risk factors include: age between 73-67 yo and tobacco use        Lung Cancer Screening:     General: Screening Not Indicated      Hepatitis C Screening:    General: Screening Current    Screening, Brief Intervention, and Referral to Treatment (SBIRT)    Screening  Typical number of drinks in a day: 0  Typical number of drinks in a week: 0  Interpretation: Low risk drinking behavior  AUDIT-C Screenin) How often did you have a drink containing alcohol in the past year? monthly or less  3) How often did you have 6 or more drinks on one occasion in the past year? never    Single Item Drug Screening:  How often have you used an illegal drug (including marijuana) or a prescription medication for non-medical reasons in the past year? never    Single Item Drug Screen Score: 0  Interpretation: Negative screen for possible drug use disorder    Other Counseling Topics:   Car/seat belt/driving safety, skin self-exam, sunscreen and calcium and vitamin D intake and regular weightbearing exercise       No exam data present     Physical Exam:     BP (!) 164/102 (BP Location: Left arm, Patient Position: Sitting, Cuff Size: Standard)   Pulse 88   Temp 97 5 °F (36 4 °C) (Temporal)   Resp 18   Ht 6' 5" (1 956 m)   Wt 106 kg (233 lb 12 8 oz)   SpO2 98%   BMI 27 72 kg/m²     Physical Exam     Ernie Baeza MD Yes...

## 2022-06-27 NOTE — PROGRESS NOTES
Assessment/Plan:    No problem-specific Assessment & Plan notes found for this encounter  Diagnoses and all orders for this visit:    Medicare annual wellness visit, subsequent    Type 2 diabetes mellitus with diabetic neuropathy, without long-term current use of insulin ([facility] )    Atrial fibrillation with RVR ([facility] )    Deep disruption or dehiscence of operation wound, subsequent encounter    Hyperlipidemia, unspecified hyperlipidemia type    Essential hypertension    Positive for microalbuminuria    Acquired absence of other right toe(s) ([facility] )    Other orders  -     Clenpiq 10-3 5-12 MG-GM -GM/160ML SOLN          Subjective:      Patient ID: Jonah Espinoza  is a 81 y o  male  PATIENT RETURNS FOR FOLLOW-UP OF CHRONIC MEDICAL CONDITIONS  ANY HOSPITAL VISITS, EMERGENCY VISITS AND OTHER PROVIDER VISITS SINCE LAST TIME WERE REVIEWED  MEDS WERE REVIEWED AND NO SIDE EFFECTS  NO NEW ISSUES  UNLESS NOTED BELOW  NO NEW MEDICAL PROVIDER REPORTED  THE CHRONIC DISEASES LISTED ABOVE ARE STABLE AND UNCHANGED/ THE PLAN OF CARE FOR THOSE WILL REMAIN UNCHANGED UNLESS NOTED BELOW  The following portions of the patient's history were reviewed and updated as appropriate: allergies, current medications, past family history, past medical history, past social history, past surgical history and problem list     Review of Systems   Constitutional: Negative for activity change and appetite change  HENT: Negative for trouble swallowing  Eyes: Negative for visual disturbance  Respiratory: Negative for cough and shortness of breath  Cardiovascular: Negative for chest pain, palpitations and leg swelling  Gastrointestinal: Negative for abdominal pain and blood in stool  Endocrine: Negative for polyuria  Genitourinary: Negative for difficulty urinating and hematuria  Skin: Negative for rash  Neurological: Positive for numbness  Negative for dizziness     Psychiatric/Behavioral: Negative for behavioral problems  Objective:  Vitals:    06/27/22 0953   BP: (!) 164/102   BP Location: Left arm   Patient Position: Sitting   Cuff Size: Standard   Pulse: 88   Resp: 18   Temp: 97 5 °F (36 4 °C)   TempSrc: Temporal   SpO2: 98%   Weight: 106 kg (233 lb 12 8 oz)   Height: 6' 5" (1 956 m)      Physical Exam  Constitutional:       Appearance: He is well-developed  HENT:      Head: Normocephalic and atraumatic  Eyes:      Conjunctiva/sclera: Conjunctivae normal    Neck:      Thyroid: No thyromegaly  Cardiovascular:      Rate and Rhythm: Normal rate and regular rhythm  Heart sounds: Normal heart sounds  No murmur heard  Pulmonary:      Effort: Pulmonary effort is normal  No respiratory distress  Breath sounds: Normal breath sounds  Musculoskeletal:      Cervical back: Neck supple  Lymphadenopathy:      Cervical: No cervical adenopathy  Skin:     General: Skin is warm and dry  Psychiatric:         Behavior: Behavior normal            Patient's chronic problems that were reviewed today are stable  Recent hospital stays reviewed  Recent labs and imaging reviewed  Recent visits to other providers reviewed  Meds reviewed and no changes made  Appropriate labs and imaging were ordered  Preventive measures appropriate for age and sex were reviewed with patient  Immunizations were updated as appropriate

## 2022-06-27 NOTE — PROGRESS NOTES
Assessment and Plan:     Problem List Items Addressed This Visit        Endocrine    Type 2 diabetes mellitus with diabetic neuropathy ([facility] [address] ) (Chronic)       Cardiovascular and Mediastinum    Essential hypertension (Chronic)    Atrial fibrillation with RVR (HCC)       Other    Hyperlipidemia (Chronic)    Positive for microalbuminuria (Chronic)    Acquired absence of other right toe(s) ([facility] [address] )    Medicare annual wellness visit, subsequent - Primary    RESOLVED: Deep disruption or dehiscence of operation wound           Preventive health issues were discussed with patient, and age appropriate screening tests were ordered as noted in patient's After Visit Summary  Personalized health advice and appropriate referrals for health education or preventive services given if needed, as noted in patient's After Visit Summary       History of Present Illness:     Patient presents for a Medicare Wellness Visit    HPI   Patient Care Team:  Taylor Sung MD as PCP - General (Family Medicine)  Nando James DPM as PCP - Wound (Podiatry)  Phylicia Bhat MD (Cardiology)     Review of Systems:     Review of Systems     Problem List:     Patient Active Problem List   Diagnosis    Hyperlipidemia    Type 2 diabetes mellitus with diabetic neuropathy ([facility] [address] )    Atrial fibrillation with RVR ([facility] [address] )    Right middle lobe pulmonary nodule    Essential hypertension    Acquired absence of other right toe(s) ([facility] [address] )    Benign prostatic hyperplasia with lower urinary tract symptoms    Positive for microalbuminuria    Aftercare for amputation stump    Ambulatory dysfunction    Diabetic ulcer of right 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, with fat layer exposed ([address] )    S/P transmetatarsal amputation of foot, right ([facility] [address] )    Sleep disturbance    Anxiety    S/P R ALT flap to right foot    COVID-19    Medicare annual wellness visit, subsequent      Past Medical and Surgical History:     Past Medical History:   Diagnosis Date  Arthritis     Atrial fibrillation ([facility] [address] )     Diagnosed 11/2018    Benign prostate hyperplasia 04/2002    Cellulitis     right lower leg     Colon polyp 2006    Diabetes mellitus ([facility] [address] )     Hearing aid worn     bilat    Hearing loss     90% loss left ear and 40% right ear    History of clubfoot     "since birth"    History of pneumonia     History of TIA (transient ischemic attack) 04/25/2017 11/30/18 pt denies    Hyperlipidemia 5/15/2014    Hypertension     Infectious viral hepatitis     "cant remember type"    Irregular heart beat     Afib    Neuropathy     both feet    Osteomyelitis ([facility] [address] )     right great toe    Pneumonia     Right middle lobe pulmonary nodule 11/6/2018    Seasickness     Teeth missing     Type 2 diabetes mellitus with diabetic neuropathy ([facility] [address] ) 11/6/2018    Wears glasses     reading    Wound, open     bottom of right foot     Past Surgical History:   Procedure Laterality Date    BUNIONECTOMY Right 12/4/2018    Procedure: 5TH METATARSAL BONE PARTIAL RESECTION, FULL THICKNESS DEBRIDEMENT OF DIABETIC ULCER;  Surgeon: Patt Couch DPM;  [facility];  Service: Podiatry    CLUB FOOT RELEASE Bilateral     COLONOSCOPY      COMPLEX WOUND CLOSURE TO EXTREMITY  4/27/2021    Procedure: COMPLEX WOUND CLOSURE TO EXTREMITY: RIGHT FOOT;  Surgeon: Salma Elliott MD;  [facility];  Service: Plastics    EXTERNAL FIXATOR APPLICATION Right 0/07/5077    Procedure: Application multiplane external fixation;  Surgeon: Kaykay Zambrano DPM;  [facility];  Service: Podiatry    FOOT HARDWARE REMOVAL Right 2/17/2021    Procedure: REMOVAL EXTERNAL FIXATOR;  Surgeon: Kaykay Zambrano DPM;  [facility];  Service: Podiatry    FOREIGN BODY REMOVAL Right 4/27/2021    Procedure: REMOVAL FOREIGN BODY EXTREMITY: RIGHT FOOT;  Surgeon: Salma Elliott MD;  [facility];  Service: Plastics    INCISION AND DRAINAGE OF WOUND Right 2/17/2021 Procedure: INCISION AND DRAINAGE (I&D) EXTREMITY;  Surgeon: Franki Duckworth DPM;  [facility];  Service: Podiatry    ORIF FOOT FRACTURE Right 3/4/2021    Procedure: VAC PLACEMENT; SCREW FIXATION RIGHT FOOT FUSION;  Surgeon: Franki Duckworth DPM;  [facility];  Service: Podiatry    [address] Right 2/22/2021    Procedure: AMPUTATION TRANSMETATARSAL (TMA), REMOVAL NAIL IM T2 ICF HARDWARE;  Surgeon: Franki Duckworth DPM;  [facility];  Service: Podiatry    TN DEBRIDEMENT, SKIN, SUB-Q TISSUE,MUSCLE,BONE,=<20 SQ CM Right 12/2/2021    Procedure: DEBRIDEMENT OF TARSAL BONES WITH ANITBIODIC BEADS;  Surgeon: Franki Duckworth DPM;  [facility];  Service: Podiatry    TN FUSION FOOT BONES,MIDTARSAL,OSTEOTMY Right 2/12/2021    Procedure: foot ARTHRODESIS/FUSION;  Surgeon: Franki Duckworth DPM;  [facility];  Service: Podiatry    TN LENGTH/SHORT LEG/ANKL TENDON,SINGLE Right 2/12/2021    Procedure: LENGTHEN TIBIAL TENDON, TRANS HEEL CORD;  Surgeon: Franki Duckworth DPM;  [facility];  Service: Podiatry    TN MUSCLE-SKIN FLAP,TRUNK Right 4/12/2021    Procedure: RECONSTRUCTION MICROSURGICAL W/ FREE FLAP;  Surgeon: Michael Greenfield MD;  [facility];  Service: Plastics    TN MUSCLE-SKIN FLAP,TRUNK Right 4/12/2021    Procedure: TAKEBACK RECONSTRUCTION MICROSURGICAL W/ FREE FLAP;  Surgeon: Michael Greenfield MD;  [facility];  Service: Plastics    TN MUSCLE-SKIN FLAP,TRUNK Right 4/13/2021    Procedure: RECONSTRUCTION MICROSURGICAL W/ FREE FLAP, RE EXPLORATION, MICRO VASCULAR REVISION;  Surgeon: Michael Greenfield MD;  [facility];  Service: Plastics    TN PART [address] BONE METATARSAL HEAD,EA Right 12/29/2020    Procedure: EXCISION EXOSTOSIS;  Surgeon: Franki Duckworth DPM;  [facility];  Service: Podiatry    TN SECD CLOS SURG WND EXTEN/COMPLIC Right 52/64/4202    Procedure: PRIMARY DELAYED CLOSURE;  Surgeon: Lea Main DPM;  [facility];  Service: Podiatry    TOE AMPUTATION Right     partial great toe    TONSILLECTOMY      VAC DRESSING APPLICATION Right 7348    Procedure: APPLICATION VAC DRESSING EXTREMITY;  Surgeon: Killian Kebede MD;  [facility];  Service: Plastics    [address] Right 3/1/2021    Procedure: DEBRIDEMENT LOWER EXTREMITY ([address] OUT); Surgeon: Killian Kebede MD;  [facility];  Service: Plastics    WOUND DEBRIDEMENT Right 2021    Procedure: DEBRIDEMENT RIGHT FOOT;  Surgeon: Killian Kebede MD;  [facility];  Service: Plastics    WOUND DEBRIDEMENT Right 2021    Procedure: DEBRIDEMENT LOWER EXTREMITY [facility] OUT);   Surgeon: Killian Kebede MD;  [facility];  Service: Plastics    WOUND DEBRIDEMENT Right 2021    Procedure: DEBRIDEMENT LOWER EXTREMITY [facility] OUT): RIGHT FOOT;  Surgeon: Killian Kebede MD;  [facility];  Service: Plastics      Family History:     Family History   Problem Relation Age of Onset    Diabetes Father         mellitus      Social History:     Social History     Socioeconomic History    Marital status: /Civil Union     Spouse name: None    Number of children: None    Years of education: None    Highest education level: None   Occupational History    None   Tobacco Use    Smoking status: Former Smoker     Types: Cigarettes     Quit date: 1988     Years since quittin 6    Smokeless tobacco: Former User    Tobacco comment: exposure to passive smoke   Vaping Use    Vaping Use: Never used   Substance and Sexual Activity    Alcohol use: Yes     Comment: occasionally    Drug use: Not Currently    Sexual activity: Yes     Partners: Female   Other Topics Concern    None   Social History Narrative    None     Social Determinants of Health     Financial Resource Strain: Not on file   Food Insecurity: Not on file   Transportation Needs: Not on file   Physical Activity: Not on file   Stress: Not on file   Social Connections: Not on file   Intimate Partner Violence: Not on file   Housing Stability: Not on file      Medications and Allergies:     Current Outpatient Medications   Medication Sig Dispense Refill    acetaminophen (Mapap Arthritis Pain) 650 mg CR tablet Take 1 tablet (650 mg total) by mouth 2 (two) times a day 60 tablet 1    atorvastatin (LIPITOR) 40 mg tablet Take 1 tablet (40 mg total) by mouth daily 90 tablet 3    Clenpiq 10-3 5-12 MG-GM -GM/160ML SOLN       diltiazem (CARDIZEM) 90 mg tablet Take 1 tablet (90 mg total) by mouth 2 (two) times a day 60 tablet 3    Eliquis 5 MG TAKE 1 TABLET BY MOUTH TWICE A DAY 60 tablet 11    glimepiride (AMARYL) 2 mg tablet Take 1 tablet (2 mg total) by mouth 2 (two) times a day 90 tablet 12    glucose blood (OneTouch Ultra) test strip Use as instructed 100 strip 3    glucose blood (OneTouch Verio) test strip Use 1 each daily Use as instructed 100 strip 3    Januvia 100 MG tablet TAKE 1 TABLET (100 MG TOTAL) BY MOUTH DAILY 132 tablet 2    Lancets (onetouch ultrasoft) lancets Use daily 100 each 3    Lancets (onetouch ultrasoft) lancets Use as instructed 100 each 3    lisinopril (ZESTRIL) 40 mg tablet TAKE 1 TABLET (40 MG TOTAL) BY MOUTH DAILY 90 tablet 3    metFORMIN (GLUCOPHAGE) 500 mg tablet Take 1 tablet (500 mg total) by mouth 2 (two) times a day with meals 180 tablet 6    metoprolol succinate (TOPROL-XL) 25 mg 24 hr tablet TAKE (3) TABLETS DAILY BY MOUTH BY MOUTH TWICE A  tablet 6    tamsulosin (FLOMAX) 0 4 mg Take 1 capsule (0 4 mg total) by mouth daily with dinner 90 capsule 1     No current facility-administered medications for this visit  Allergies   Allergen Reactions    Simvastatin Myalgia    Itraconazole Other (See Comments)     Pt denies knowledge of allergy          Immunizations:     Immunization History Administered Date(s) Administered    COVID-19 PFIZER VACCINE 0 3 ML IM 05/06/2021, 05/27/2021, 12/21/2021    INFLUENZA 12/04/2014, 09/30/2016, 09/30/2016, 11/19/2018    Influenza Split 01/14/2011, 12/20/2011, 11/26/2013    Influenza, high dose seasonal 0 7 mL 09/05/2018, 09/03/2019, 12/23/2020, 01/17/2022    Influenza, seasonal, injectable 09/11/2009, 12/04/2014    Pneumococcal Conjugate 13-Valent 10/24/2017, 11/17/2017    Pneumococcal Polysaccharide PPV23 03/06/2007, 01/04/2019    Zoster 09/18/2015      Health Maintenance:         Topic Date Due    Colorectal Cancer Screening  12/07/2021    Hepatitis C Screening  Completed         Topic Date Due    DTaP,Tdap,and Td Vaccines (1 - Tdap) Never done    COVID-19 Vaccine (4 - Booster for [employer] [place] series) 04/21/2022      Medicare Screening Tests and Risk Assessments:     Stanislav Vides is here for his Subsequent Wellness visit  Last Medicare Wellness visit information reviewed, patient interviewed, no change since last AWV  Depression Screening:   PHQ-2 Score: 0      Advance Care Planning:   Living will: Yes    Durable POA for healthcare:  Yes    Advanced directive: Yes      Cognitive Screening:   Provider or family/friend/caregiver concerned regarding cognition?: No    PREVENTIVE SCREENINGS      Cardiovascular Screening:    General: Screening Not Indicated and History Lipid Disorder      Diabetes Screening:     General: Screening Not Indicated and History Diabetes      Colorectal Cancer Screening:     General: Screening Current      Prostate Cancer Screening:    General: Screening Current      Osteoporosis Screening:    General: Screening Not Indicated      Abdominal Aortic Aneurysm (AAA) Screening:    Risk factors include: age between 73-67 yo and tobacco use        Lung Cancer Screening:     General: Screening Not Indicated      Hepatitis C Screening:    General: Screening Current    Screening, Brief Intervention, and Referral to Treatment (SBIRT)    Screening      AUDIT-C Screenin) How often did you have a drink containing alcohol in the past year? monthly or less  3) How often did you have 6 or more drinks on one occasion in the past year? never    Brief Intervention  Alcohol & drug use screenings were reviewed  No concerns regarding substance use disorder identified       No exam data present     Physical Exam:     BP (!) 164/102 (BP Location: Left arm, Patient Position: Sitting, Cuff Size: Standard)   Pulse 88   Temp 97 5 °F (36 4 °C) (Temporal)   Resp 18   Ht 6' 5" (1 956 m)   Wt 106 kg (233 lb 12 8 oz)   SpO2 98%   BMI 27 72 kg/m²     Physical Exam     Benitez Roche MD

## 2022-06-27 NOTE — PATIENT INSTRUCTIONS
Medicare Preventive Visit Patient Instructions  Thank you for completing your Welcome to Medicare Visit or Medicare Annual Wellness Visit today  Your next wellness visit will be due in one year (6/28/2023)  The screening/preventive services that you may require over the next 5-10 years are detailed below  Some tests may not apply to you based off risk factors and/or age  Screening tests ordered at today's visit but not completed yet may show as past due  Also, please note that scanned in results may not display below  Preventive Screenings:  Service Recommendations Previous Testing/Comments   Colorectal Cancer Screening  · Colonoscopy    · Fecal Occult Blood Test (FOBT)/Fecal Immunochemical Test (FIT)  · Fecal DNA/Cologuard Test  · Flexible Sigmoidoscopy Age: 54-65 years old   Colonoscopy: every 10 years (May be performed more frequently if at higher risk)  OR  FOBT/FIT: every 1 year  OR  Cologuard: every 3 years  OR  Sigmoidoscopy: every 5 years  Screening may be recommended earlier than age 48 if at higher risk for colorectal cancer  Also, an individualized decision between you and your healthcare provider will decide whether screening between the ages of 74-80 would be appropriate   Colonoscopy: 12/07/2016  FOBT/FIT: Not on file  Cologuard: Not on file  Sigmoidoscopy: Not on file    Screening Current     Prostate Cancer Screening Individualized decision between patient and health care provider in men between ages of 53-78   Medicare will cover every 12 months beginning on the day after your 50th birthday PSA: 2 0 ng/mL     Screening Not Indicated     Hepatitis C Screening Once for adults born between 1945 and 1965  More frequently in patients at high risk for Hepatitis C Hep C Antibody: 02/20/2021    Screening Current   Diabetes Screening 1-2 times per year if you're at risk for diabetes or have pre-diabetes Fasting glucose: 214 mg/dL   A1C: 8 8    Screening Not Indicated  History Diabetes   Cholesterol Screening Once every 5 years if you don't have a lipid disorder  May order more often based on risk factors  Lipid panel: 11/08/2018    Screening Not Indicated  History Lipid Disorder      Other Preventive Screenings Covered by Medicare:  1  Abdominal Aortic Aneurysm (AAA) Screening: covered once if your at risk  You're considered to be at risk if you have a family history of AAA or a male between the age of 73-68 who smoking at least 100 cigarettes in your lifetime  2  Lung Cancer Screening: covers low dose CT scan once per year if you meet all of the following conditions: (1) Age 50-69; (2) No signs or symptoms of lung cancer; (3) Current smoker or have quit smoking within the last 15 years; (4) You have a tobacco smoking history of at least 30 pack years (packs per day x number of years you smoked); (5) You get a written order from a healthcare provider  3  Glaucoma Screening: covered annually if you're considered high risk: (1) You have diabetes OR (2) Family history of glaucoma OR (3)  aged 48 and older OR (3)  American aged 72 and older  3  Osteoporosis Screening: covered every 2 years if you meet one of the following conditions: (1) Have a vertebral abnormality; (2) On glucocorticoid therapy for more than 3 months; (3) Have primary hyperparathyroidism; (4) On osteoporosis medications and need to assess response to drug therapy  5  HIV Screening: covered annually if you're between the age of 12-76  Also covered annually if you are younger than 13 and older than 72 with risk factors for HIV infection  For pregnant patients, it is covered up to 3 times per pregnancy      Immunizations:  Immunization Recommendations   Influenza Vaccine Annual influenza vaccination during flu season is recommended for all persons aged >= 6 months who do not have contraindications   Pneumococcal Vaccine (Prevnar and Pneumovax)  * Prevnar = PCV13  * Pneumovax = PPSV23 Adults 25-60 years old: 1-3 doses may be recommended based on certain risk factors  Adults 72 years old: Prevnar (PCV13) vaccine recommended followed by Pneumovax (PPSV23) vaccine  If already received PPSV23 since turning 65, then PCV13 recommended at least one year after PPSV23 dose  Hepatitis B Vaccine 3 dose series if at intermediate or high risk (ex: diabetes, end stage renal disease, liver disease)   Tetanus (Td) Vaccine - COST NOT COVERED BY MEDICARE PART B Following completion of primary series, a booster dose should be given every 10 years to maintain immunity against tetanus  Td may also be given as tetanus wound prophylaxis  Tdap Vaccine - COST NOT COVERED BY MEDICARE PART B Recommended at least once for all adults  For pregnant patients, recommended with each pregnancy  Shingles Vaccine (Shingrix) - COST NOT COVERED BY MEDICARE PART B  2 shot series recommended in those aged 48 and above     Health Maintenance Due:      Topic Date Due    Colorectal Cancer Screening  12/07/2021    Hepatitis C Screening  Completed     Immunizations Due:      Topic Date Due    DTaP,Tdap,and Td Vaccines (1 - Tdap) Never done    COVID-19 Vaccine (4 - Booster for Pfizer series) 04/21/2022     Advance Directives   What are advance directives? Advance directives are legal documents that state your wishes and plans for medical care  These plans are made ahead of time in case you lose your ability to make decisions for yourself  Advance directives can apply to any medical decision, such as the treatments you want, and if you want to donate organs  What are the types of advance directives? There are many types of advance directives, and each state has rules about how to use them  You may choose a combination of any of the following:  · Living will: This is a written record of the treatment you want  You can also choose which treatments you do not want, which to limit, and which to stop at a certain time   This includes surgery, medicine, IV fluid, and tube feedings  · Durable power of  for healthcare [facility]): This is a written record that states who you want to make healthcare choices for you when you are unable to make them for yourself  This person, called a proxy, is usually a family member or a friend  You may choose more than 1 proxy  · Do not resuscitate (DNR) order:  A DNR order is used in case your heart stops beating or you stop breathing  It is a request not to have certain forms of treatment, such as CPR  A DNR order may be included in other types of advance directives  · Medical directive: This covers the care that you want if you are in a coma, near death, or unable to make decisions for yourself  You can list the treatments you want for each condition  Treatment may include pain medicine, surgery, blood transfusions, dialysis, IV or tube feedings, and a ventilator (breathing machine)  · Values history: This document has questions about your views, beliefs, and how you feel and think about life  This information can help others choose the care that you would choose  Why are advance directives important? An advance directive helps you control your care  Although spoken wishes may be used, it is better to have your wishes written down  Spoken wishes can be misunderstood, or not followed  Treatments may be given even if you do not want them  An advance directive may make it easier for your family to make difficult choices about your care  Weight Management   Why it is important to manage your weight:  Being overweight increases your risk of health conditions such as heart disease, high blood pressure, type 2 diabetes, and certain types of cancer  It can also increase your risk for osteoarthritis, sleep apnea, and other respiratory problems  Aim for a slow, steady weight loss  Even a small amount of weight loss can lower your risk of health problems    How to lose weight safely:  A safe and healthy way to lose weight is to eat fewer calories and get regular exercise  You can lose up about 1 pound a week by decreasing the number of calories you eat by 500 calories each day  Healthy meal plan for weight management:  A healthy meal plan includes a variety of foods, contains fewer calories, and helps you stay healthy  A healthy meal plan includes the following:  · Eat whole-grain foods more often  A healthy meal plan should contain fiber  Fiber is the part of grains, fruits, and vegetables that is not broken down by your body  Whole-grain foods are healthy and provide extra fiber in your diet  Some examples of whole-grain foods are whole-wheat breads and pastas, oatmeal, brown rice, and bulgur  · Eat a variety of vegetables every day  Include dark, leafy greens such as spinach, kale, lisa greens, and mustard greens  Eat yellow and orange vegetables such as carrots, sweet potatoes, and winter squash  · Eat a variety of fruits every day  Choose fresh or canned fruit (canned in its own juice or light syrup) instead of juice  Fruit juice has very little or no fiber  · Eat low-fat dairy foods  Drink fat-free (skim) milk or 1% milk  Eat fat-free yogurt and low-fat cottage cheese  Try low-fat cheeses such as mozzarella and other reduced-fat cheeses  · Choose meat and other protein foods that are low in fat  Choose beans or other legumes such as split peas or lentils  Choose fish, skinless poultry (chicken or turkey), or lean cuts of red meat (beef or pork)  Before you cook meat or poultry, cut off any visible fat  · Use less fat and oil  Try baking foods instead of frying them  Add less fat, such as margarine, sour cream, regular salad dressing and mayonnaise to foods  Eat fewer high-fat foods  Some examples of high-fat foods include french fries, doughnuts, ice cream, and cakes  · Eat fewer sweets  Limit foods and drinks that are high in sugar  This includes candy, cookies, regular soda, and sweetened drinks    Exercise:  Exercise at least 30 minutes per day on most days of the week  Some examples of exercise include walking, biking, dancing, and swimming  You can also fit in more physical activity by taking the stairs instead of the elevator or parking farther away from stores  Ask your healthcare provider about the best exercise plan for you  © Copyright [employer] 2018 Information is for End User's use only and may not be sold, redistributed or otherwise used for commercial purposes  All illustrations and images included in CareNotes® are the copyrighted property of [employer]  or [facility] [employer]       I do recommend updating the COVID vaccine  I also recommend updating the adult Tdap  For tetanus  Medicare Preventive Visit Patient Instructions  Thank you for completing your Welcome to Medicare Visit or Medicare Annual Wellness Visit today  Your next wellness visit will be due in one year (6/28/2023)  The screening/preventive services that you may require over the next 5-10 years are detailed below  Some tests may not apply to you based off risk factors and/or age  Screening tests ordered at today's visit but not completed yet may show as past due  Also, please note that scanned in results may not display below  Preventive Screenings:  Service Recommendations Previous Testing/Comments   Colorectal Cancer Screening  · Colonoscopy    · Fecal Occult Blood Test (FOBT)/Fecal Immunochemical Test (FIT)  · Fecal DNA/Cologuard Test  · Flexible Sigmoidoscopy Age: 54-65 years old   Colonoscopy: every 10 years (May be performed more frequently if at higher risk)  OR  FOBT/FIT: every 1 year  OR  Cologuard: every 3 years  OR  Sigmoidoscopy: every 5 years  Screening may be recommended earlier than age 48 if at higher risk for colorectal cancer  Also, an individualized decision between you and your healthcare provider will decide whether screening between the ages of 74-80 would be appropriate   Colonoscopy: 12/07/2016  FOBT/FIT: Not on file  Cologuard: Not on file  Sigmoidoscopy: Not on file    Screening Current  Screening Current     Prostate Cancer Screening Individualized decision between patient and health care provider in men between ages of 53-78   Medicare will cover every 12 months beginning on the day after your 50th birthday PSA: 2 0 ng/mL     Screening Not Indicated  Screening Current     Hepatitis C Screening Once for adults born between 1945 and 1965  More frequently in patients at high risk for Hepatitis C Hep C Antibody: 02/20/2021    Screening Current  Screening Current   Diabetes Screening 1-2 times per year if you're at risk for diabetes or have pre-diabetes Fasting glucose: 214 mg/dL   A1C: 8 8    Screening Not Indicated  History Diabetes  Screening Not Indicated  History Diabetes   Cholesterol Screening Once every 5 years if you don't have a lipid disorder  May order more often based on risk factors  Lipid panel: 11/08/2018    Screening Not Indicated  History Lipid Disorder  Screening Not Indicated  History Lipid Disorder      Other Preventive Screenings Covered by Medicare:  1  Abdominal Aortic Aneurysm (AAA) Screening: covered once if your at risk  You're considered to be at risk if you have a family history of AAA or a male between the age of 73-68 who smoking at least 100 cigarettes in your lifetime  2  Lung Cancer Screening: covers low dose CT scan once per year if you meet all of the following conditions: (1) Age 50-69; (2) No signs or symptoms of lung cancer; (3) Current smoker or have quit smoking within the last 15 years; (4) You have a tobacco smoking history of at least 30 pack years (packs per day x number of years you smoked); (5) You get a written order from a healthcare provider  3  Glaucoma Screening: covered annually if you're considered high risk: (1) You have diabetes OR (2) Family history of glaucoma OR (3)  aged 48 and older OR (3)  American aged 72 and older  3   Osteoporosis Screening: covered every 2 years if you meet one of the following conditions: (1) Have a vertebral abnormality; (2) On glucocorticoid therapy for more than 3 months; (3) Have primary hyperparathyroidism; (4) On osteoporosis medications and need to assess response to drug therapy  5  HIV Screening: covered annually if you're between the age of 12-76  Also covered annually if you are younger than 13 and older than 72 with risk factors for HIV infection  For pregnant patients, it is covered up to 3 times per pregnancy  Immunizations:  Immunization Recommendations   Influenza Vaccine Annual influenza vaccination during flu season is recommended for all persons aged >= 6 months who do not have contraindications   Pneumococcal Vaccine (Prevnar and Pneumovax)  * Prevnar = PCV13  * Pneumovax = PPSV23 Adults 25-60 years old: 1-3 doses may be recommended based on certain risk factors  Adults 72 years old: Prevnar (PCV13) vaccine recommended followed by Pneumovax (PPSV23) vaccine  If already received PPSV23 since turning 65, then PCV13 recommended at least one year after PPSV23 dose  Hepatitis B Vaccine 3 dose series if at intermediate or high risk (ex: diabetes, end stage renal disease, liver disease)   Tetanus (Td) Vaccine - COST NOT COVERED BY MEDICARE PART B Following completion of primary series, a booster dose should be given every 10 years to maintain immunity against tetanus  Td may also be given as tetanus wound prophylaxis  Tdap Vaccine - COST NOT COVERED BY MEDICARE PART B Recommended at least once for all adults  For pregnant patients, recommended with each pregnancy     Shingles Vaccine (Shingrix) - COST NOT COVERED BY MEDICARE PART B  2 shot series recommended in those aged 48 and above     Health Maintenance Due:      Topic Date Due    Colorectal Cancer Screening  12/07/2021    Hepatitis C Screening  Completed     Immunizations Due:      Topic Date Due    DTaP,Tdap,and Td Vaccines (1 - Tdap) Never done    COVID-19 Vaccine (4 - Booster for Comp[employer] series) 04/21/2022     Advance Directives   What are advance directives? Advance directives are legal documents that state your wishes and plans for medical care  These plans are made ahead of time in case you lose your ability to make decisions for yourself  Advance directives can apply to any medical decision, such as the treatments you want, and if you want to donate organs  What are the types of advance directives? There are many types of advance directives, and each state has rules about how to use them  You may choose a combination of any of the following:  · Living will: This is a written record of the treatment you want  You can also choose which treatments you do not want, which to limit, and which to stop at a certain time  This includes surgery, medicine, IV fluid, and tube feedings  · Durable power of  for healthcare [place] [facility]): This is a written record that states who you want to make healthcare choices for you when you are unable to make them for yourself  This person, called a proxy, is usually a family member or a friend  You may choose more than 1 proxy  · Do not resuscitate (DNR) order:  A DNR order is used in case your heart stops beating or you stop breathing  It is a request not to have certain forms of treatment, such as CPR  A DNR order may be included in other types of advance directives  · Medical directive: This covers the care that you want if you are in a coma, near death, or unable to make decisions for yourself  You can list the treatments you want for each condition  Treatment may include pain medicine, surgery, blood transfusions, dialysis, IV or tube feedings, and a ventilator (breathing machine)  · Values history: This document has questions about your views, beliefs, and how you feel and think about life  This information can help others choose the care that you would choose  Why are advance directives important? An advance directive helps you control your care  Although spoken wishes may be used, it is better to have your wishes written down  Spoken wishes can be misunderstood, or not followed  Treatments may be given even if you do not want them  An advance directive may make it easier for your family to make difficult choices about your care  Weight Management   Why it is important to manage your weight:  Being overweight increases your risk of health conditions such as heart disease, high blood pressure, type 2 diabetes, and certain types of cancer  It can also increase your risk for osteoarthritis, sleep apnea, and other respiratory problems  Aim for a slow, steady weight loss  Even a small amount of weight loss can lower your risk of health problems  How to lose weight safely:  A safe and healthy way to lose weight is to eat fewer calories and get regular exercise  You can lose up about 1 pound a week by decreasing the number of calories you eat by 500 calories each day  Healthy meal plan for weight management:  A healthy meal plan includes a variety of foods, contains fewer calories, and helps you stay healthy  A healthy meal plan includes the following:  · Eat whole-grain foods more often  A healthy meal plan should contain fiber  Fiber is the part of grains, fruits, and vegetables that is not broken down by your body  Whole-grain foods are healthy and provide extra fiber in your diet  Some examples of whole-grain foods are whole-wheat breads and pastas, oatmeal, brown rice, and bulgur  · Eat a variety of vegetables every day  Include dark, leafy greens such as spinach, kale, lisa greens, and mustard greens  Eat yellow and orange vegetables such as carrots, sweet potatoes, and winter squash  · Eat a variety of fruits every day  Choose fresh or canned fruit (canned in its own juice or light syrup) instead of juice  Fruit juice has very little or no fiber  · Eat low-fat dairy foods    Drink fat-free (skim) milk or 1% milk  Eat fat-free yogurt and low-fat cottage cheese  Try low-fat cheeses such as mozzarella and other reduced-fat cheeses  · Choose meat and other protein foods that are low in fat  Choose beans or other legumes such as split peas or lentils  Choose fish, skinless poultry (chicken or turkey), or lean cuts of red meat (beef or pork)  Before you cook meat or poultry, cut off any visible fat  · Use less fat and oil  Try baking foods instead of frying them  Add less fat, such as margarine, sour cream, regular salad dressing and mayonnaise to foods  Eat fewer high-fat foods  Some examples of high-fat foods include french fries, doughnuts, ice cream, and cakes  · Eat fewer sweets  Limit foods and drinks that are high in sugar  This includes candy, cookies, regular soda, and sweetened drinks  Exercise:  Exercise at least 30 minutes per day on most days of the week  Some examples of exercise include walking, biking, dancing, and swimming  You can also fit in more physical activity by taking the stairs instead of the elevator or parking farther away from stores  Ask your healthcare provider about the best exercise plan for you  © Copyright [employer] 2018 Information is for End User's use only and may not be sold, redistributed or otherwise used for commercial purposes   All illustrations and images included in CareNotes® are the copyrighted property of [employer]  or [address] [place] [address]

## 2022-06-28 ENCOUNTER — TELEPHONE (OUTPATIENT)
Dept: PODIATRY | Facility: CLINIC | Age: 72
End: 2022-06-28

## 2022-06-28 NOTE — TELEPHONE ENCOUNTER
Spoke with pt and wife  Wife states Ani informed them that the custom molded shoes were denied  Pt then pd out of pocket for the shoes "because he needs them"    Will call [employer] to follow up on the appeal

## 2022-06-28 NOTE — TELEPHONE ENCOUNTER
Phone call to pt to follow up on the appeal for the custom molded shoes  No answer, left message to call office back with update

## 2022-06-28 NOTE — TELEPHONE ENCOUNTER
Jonathan Wynn called, he received a call from our office  I looked in his chart and it was from  Sayra, as I was speaking with Jonathan Wynn, he received another call from Sayra  He disconnected from me to answer her call

## 2022-06-29 ENCOUNTER — TELEPHONE (OUTPATIENT)
Dept: PODIATRY | Facility: CLINIC | Age: 72
End: 2022-06-29

## 2022-06-29 NOTE — TELEPHONE ENCOUNTER
Phone call to [facility] yesterday (6/28)  Spoke with Cristina Schneider [phone]  Cristina Schneider states that there are no claims for custom molded shoes, no authorization submitted, no call and no request for shoes from [employer] Schneider states there is no denial because there is no request      Phone call to Pt's wife  Explained conversation with [facility]  Phone call to [employer], left message to have Dinora Been call back to discuss the authorization for the custom molded shoes

## 2022-08-09 ENCOUNTER — TELEPHONE (OUTPATIENT)
Dept: PODIATRY | Facility: CLINIC | Age: 72
End: 2022-08-09

## 2022-08-09 NOTE — TELEPHONE ENCOUNTER
This patient's  wife called  He is having a lot of trouble walking distances  They were told they could get a electric scooter though Medicare  Can we write him a script for one? Thank you

## 2022-08-10 ENCOUNTER — TELEPHONE (OUTPATIENT)
Dept: PODIATRY | Facility: CLINIC | Age: 72
End: 2022-08-10

## 2022-08-10 DIAGNOSIS — E11.40 TYPE 2 DIABETES MELLITUS WITH DIABETIC NEUROPATHY, WITHOUT LONG-TERM CURRENT USE OF INSULIN (HCC): ICD-10-CM

## 2022-08-10 DIAGNOSIS — Z89.431 S/P TRANSMETATARSAL AMPUTATION OF FOOT, RIGHT (HCC): Primary | ICD-10-CM

## 2022-08-18 ENCOUNTER — ANESTHESIA EVENT (OUTPATIENT)
Dept: ANESTHESIOLOGY | Facility: HOSPITAL | Age: 72
End: 2022-08-18

## 2022-08-18 ENCOUNTER — ANESTHESIA (OUTPATIENT)
Dept: ANESTHESIOLOGY | Facility: HOSPITAL | Age: 72
End: 2022-08-18

## 2022-08-30 ENCOUNTER — TELEPHONE (OUTPATIENT)
Dept: FAMILY MEDICINE CLINIC | Facility: CLINIC | Age: 72
End: 2022-08-30

## 2022-08-30 NOTE — TELEPHONE ENCOUNTER
Per Dr Anthony Fabian  He does not fill out form for power mobility device   Alyson Stanford should call [employer] and they can expedite    Wife and patient notified

## 2022-08-31 ENCOUNTER — NURSE TRIAGE (OUTPATIENT)
Dept: OTHER | Facility: OTHER | Age: 72
End: 2022-08-31

## 2022-08-31 NOTE — TELEPHONE ENCOUNTER
Reason for Disposition   Information only question and nurse able to answer    Answer Assessment - Initial Assessment Questions  1  REASON FOR CALL or QUESTION: "What is your reason for calling today?" or "How can I best help you?" or "What question do you have that I can help answer?"      Patient would like to know if he can have anything to eat prior to procedure tomorrow   Advised patient on clears per DM instructions for bowl prep verbalized understanding    Protocols used: INFORMATION ONLY CALL - NO TRIAGE-ADULT-[place]

## 2022-08-31 NOTE — TELEPHONE ENCOUNTER
Regarding: Prep instructions  ----- Message from Morelia Sepulveda sent at 8/31/2022  1:46 PM EDT -----  " I am having my colonoscopy tomorrow and 'Im confused with the instructions  Can I eat and drink before midnight or not because I am starving   I am diabetic "

## 2022-09-01 ENCOUNTER — ANESTHESIA (OUTPATIENT)
Dept: GASTROENTEROLOGY | Facility: AMBULARY SURGERY CENTER | Age: 72
End: 2022-09-01

## 2022-09-01 ENCOUNTER — HOSPITAL ENCOUNTER (OUTPATIENT)
Dept: GASTROENTEROLOGY | Facility: AMBULARY SURGERY CENTER | Age: 72
Setting detail: OUTPATIENT SURGERY
End: 2022-09-01
Attending: INTERNAL MEDICINE
Payer: COMMERCIAL

## 2022-09-01 ENCOUNTER — ANESTHESIA EVENT (OUTPATIENT)
Dept: GASTROENTEROLOGY | Facility: AMBULARY SURGERY CENTER | Age: 72
End: 2022-09-01

## 2022-09-01 VITALS
OXYGEN SATURATION: 98 % | BODY MASS INDEX: 26.8 KG/M2 | TEMPERATURE: 97 F | HEIGHT: 77 IN | DIASTOLIC BLOOD PRESSURE: 69 MMHG | WEIGHT: 227 LBS | SYSTOLIC BLOOD PRESSURE: 97 MMHG | RESPIRATION RATE: 18 BRPM | HEART RATE: 97 BPM

## 2022-09-01 DIAGNOSIS — Z12.11 SCREENING FOR COLORECTAL CANCER: ICD-10-CM

## 2022-09-01 DIAGNOSIS — Z12.12 SCREENING FOR COLORECTAL CANCER: ICD-10-CM

## 2022-09-01 LAB — GLUCOSE SERPL-MCNC: 213 MG/DL (ref 65–140)

## 2022-09-01 PROCEDURE — 82948 REAGENT STRIP/BLOOD GLUCOSE: CPT

## 2022-09-01 PROCEDURE — 88305 TISSUE EXAM BY PATHOLOGIST: CPT | Performed by: STUDENT IN AN ORGANIZED HEALTH CARE EDUCATION/TRAINING PROGRAM

## 2022-09-01 PROCEDURE — 45385 COLONOSCOPY W/LESION REMOVAL: CPT | Performed by: INTERNAL MEDICINE

## 2022-09-01 RX ORDER — SODIUM CHLORIDE, SODIUM LACTATE, POTASSIUM CHLORIDE, CALCIUM CHLORIDE 600; 310; 30; 20 MG/100ML; MG/100ML; MG/100ML; MG/100ML
INJECTION, SOLUTION INTRAVENOUS CONTINUOUS PRN
Status: DISCONTINUED | OUTPATIENT
Start: 2022-09-01 | End: 2022-09-01

## 2022-09-01 RX ORDER — PROPOFOL 10 MG/ML
INJECTION, EMULSION INTRAVENOUS AS NEEDED
Status: DISCONTINUED | OUTPATIENT
Start: 2022-09-01 | End: 2022-09-01

## 2022-09-01 RX ADMIN — PROPOFOL 30 MG: 10 INJECTION, EMULSION INTRAVENOUS at 09:25

## 2022-09-01 RX ADMIN — PROPOFOL 100 MG: 10 INJECTION, EMULSION INTRAVENOUS at 09:09

## 2022-09-01 RX ADMIN — PROPOFOL 100 MG: 10 INJECTION, EMULSION INTRAVENOUS at 09:04

## 2022-09-01 RX ADMIN — SODIUM CHLORIDE, SODIUM LACTATE, POTASSIUM CHLORIDE, AND CALCIUM CHLORIDE: .6; .31; .03; .02 INJECTION, SOLUTION INTRAVENOUS at 08:57

## 2022-09-01 RX ADMIN — PROPOFOL 100 MG: 10 INJECTION, EMULSION INTRAVENOUS at 09:06

## 2022-09-01 RX ADMIN — Medication 40 MG: at 09:20

## 2022-09-01 RX ADMIN — PROPOFOL 100 MG: 10 INJECTION, EMULSION INTRAVENOUS at 09:17

## 2022-09-01 NOTE — ANESTHESIA PREPROCEDURE EVALUATION
Procedure:  COLONOSCOPY    Relevant Problems   CARDIO   (+) Atrial fibrillation with RVR (HCC)   (+) Essential hypertension   (+) Hyperlipidemia      ENDO   (+) Type 2 diabetes mellitus with diabetic neuropathy (HCC)      /RENAL   (+) Benign prostatic hyperplasia with lower urinary tract symptoms      NEURO/PSYCH   (+) Anxiety   (+) Type 2 diabetes mellitus with diabetic neuropathy (HCC)      Other   (+) S/P transmetatarsal amputation of foot, right (HCC)        Physical Exam    Airway    Mallampati score: I  TM Distance: >3 FB  Neck ROM: full     Dental   Comment: Multiple missing and decayed teeth,     Cardiovascular      Pulmonary      Other Findings        Anesthesia Plan  ASA Score- 3     Anesthesia Type- IV sedation with anesthesia with ASA Monitors  Additional Monitors:   Airway Plan:           Plan Factors-Exercise tolerance (METS): >4 METS  Chart reviewed  Existing labs reviewed  Patient summary reviewed  Induction- intravenous  Postoperative Plan-     Informed Consent- Anesthetic plan and risks discussed with patient  I personally reviewed this patient with the CRNA  Discussed and agreed on the Anesthesia Plan with the CRNA  Jewel Magallon

## 2022-09-01 NOTE — H&P
History and Physical - [facility]  Leah Hussein  81 y o  male MRN: [id number]    HPI: Leah Hussein  is a 81y o  year old male who presents with colon cancer screening         Review of Systems    Historical Information   Past Medical History:   Diagnosis Date    Arthritis     Atrial fibrillation ([facility] )     Diagnosed 11/2018    Benign prostate hyperplasia 04/2002    Cellulitis     right lower leg     Colon polyp 2006    Diabetes mellitus ([facility] )     Hearing aid worn     bilat    Hearing loss     90% loss left ear and 40% right ear    History of clubfoot     "since birth"    History of pneumonia     History of TIA (transient ischemic attack) 04/25/2017 11/30/18 pt denies    Hyperlipidemia 5/15/2014    Hypertension     Infectious viral hepatitis     "cant remember type"    Irregular heart beat     Afib    Neuropathy     both feet    Osteomyelitis ([facility] )     right great toe    Pneumonia     Right middle lobe pulmonary nodule 11/6/2018    Seasickness     Teeth missing     Type 2 diabetes mellitus with diabetic neuropathy ([facility] ) 11/6/2018    Wears glasses     reading    Wound, open     bottom of right foot     Past Surgical History:   Procedure Laterality Date    BUNIONECTOMY Right 12/4/2018    Procedure: 5TH METATARSAL BONE PARTIAL RESECTION, FULL THICKNESS DEBRIDEMENT OF DIABETIC ULCER;  Surgeon: Sid Moya DPM;  [facility];  Service: Podiatry    CLUB FOOT RELEASE Bilateral     COLONOSCOPY      COMPLEX WOUND CLOSURE TO EXTREMITY  4/27/2021    Procedure: COMPLEX WOUND CLOSURE TO EXTREMITY: RIGHT FOOT;  Surgeon: Mely Weiner MD;  Location:  MAIN OR;  Service: Plastics    EXTERNAL FIXATOR APPLICATION Right 0/87/7285    Procedure: Application multiplane external fixation;  Surgeon: Christine Em DPM;  [facility];  Service: Podiatry    FOOT HARDWARE REMOVAL Right 2/17/2021    Procedure: REMOVAL EXTERNAL FIXATOR;  Surgeon: Karo Bacon Brenda García DPM;  [facility];  Service: Podiatry    FOREIGN BODY REMOVAL Right 4/27/2021    Procedure: REMOVAL FOREIGN BODY EXTREMITY: RIGHT FOOT;  Surgeon: Manjula Espinoza MD;  [facility];  Service: Plastics    INCISION AND DRAINAGE OF WOUND Right 2/17/2021    Procedure: INCISION AND DRAINAGE (I&D) EXTREMITY;  Surgeon: Sushant Delgado DPM;  [facility];  Service: Podiatry    ORIF FOOT FRACTURE Right 3/4/2021    Procedure: VAC PLACEMENT; SCREW FIXATION RIGHT FOOT FUSION;  Surgeon: Sushant Delgado DPM;  [facility];  Service: Podiatry    [address] Right 2/22/2021    Procedure: AMPUTATION TRANSMETATARSAL (TMA), REMOVAL NAIL IM T2 ICF HARDWARE;  Surgeon: Sushant Delgado DPM;  [facility];  Service: Podiatry    WY DEBRIDEMENT, SKIN, SUB-Q TISSUE,MUSCLE,BONE,=<20 SQ CM Right 12/2/2021    Procedure: DEBRIDEMENT OF TARSAL BONES WITH ANITBIODIC BEADS;  Surgeon: Sushant Delgado DPM;  [facility];  Service: Podiatry    WY FUSION FOOT BONES,MIDTARSAL,OSTEOTMY Right 2/12/2021    Procedure: foot ARTHRODESIS/FUSION;  Surgeon: Sushant Delgado DPM;  [facility];  Service: Podiatry    WY LENGTH/SHORT LEG/ANKL TENDON,SINGLE Right 2/12/2021    Procedure: LENGTHEN TIBIAL TENDON, TRANS HEEL CORD;  Surgeon: Sushant Delgado DPM;  [facility];  Service: Podiatry    WY MUSCLE-SKIN FLAP,TRUNK Right 4/12/2021    Procedure: RECONSTRUCTION MICROSURGICAL W/ FREE FLAP;  Surgeon: Manjula Espinoza MD;  [facility];  Service: Plastics    WY MUSCLE-SKIN FLAP,TRUNK Right 4/12/2021    Procedure: TAKEBACK RECONSTRUCTION MICROSURGICAL W/ FREE FLAP;  Surgeon: Manjula Espinoza MD;  [facility];  Service: Plastics    WY MUSCLE-SKIN FLAP,TRUNK Right 4/13/2021    Procedure: RECONSTRUCTION MICROSURGICAL W/ FREE FLAP, RE EXPLORATION, MICRO VASCULAR REVISION;  Surgeon: Manjula Espinoza MD;  [facility];  Service: Plastics    AR PART [address] BONE METATARSAL HEAD,EA Right 2020    Procedure: EXCISION EXOSTOSIS;  Surgeon: Alverto Westbrook DPM;  [facility];  Service: Podiatry    AR SECD CLOS SURG WND EXTEN/COMPLIC Right     Procedure: PRIMARY DELAYED CLOSURE;  Surgeon: Alverto Westbrook DPM;  [facility];  Service: Podiatry    TOE AMPUTATION Right     partial great toe    TONSILLECTOMY      VAC DRESSING APPLICATION Right 4419    Procedure: APPLICATION VAC DRESSING EXTREMITY;  Surgeon: Rosibel Duarte MD;  [facility];  Service: Plastics    [address] Right 3/1/2021    Procedure: DEBRIDEMENT LOWER EXTREMITY ([address] OUT); Surgeon: Rosibel Duarte MD;  [facility];  Service: Plastics    WOUND DEBRIDEMENT Right 2021    Procedure: DEBRIDEMENT RIGHT FOOT;  Surgeon: Rosibel Duarte MD;  [facility];  Service: Plastics    WOUND DEBRIDEMENT Right 2021    Procedure: DEBRIDEMENT LOWER EXTREMITY [facility] OUT);   Surgeon: Rosibel Duarte MD;  [facility];  Service: Plastics    WOUND DEBRIDEMENT Right 2021    Procedure: DEBRIDEMENT LOWER EXTREMITY [facility] OUT): RIGHT FOOT;  Surgeon: Rosibel Duarte MD;  [facility];  Service: Plastics     Social History   Social History     Substance and Sexual Activity   Alcohol Use Yes    Comment: occasionally     Social History     Substance and Sexual Activity   Drug Use Not Currently     Social History     Tobacco Use   Smoking Status Former Smoker    Types: Cigarettes    Quit date: 1988    Years since quittin 8   Smokeless Tobacco Former User   Tobacco Comment    exposure to passive smoke     Family History   Problem Relation Age of Onset    Diabetes Father         mellitus       Meds/Allergies     (Not in a hospital admission)      Allergies   Allergen Reactions    Simvastatin Myalgia    Itraconazole Other (See Comments)     Pt denies knowledge of allergy           Objective     BP (!) 168/108   Pulse (!) 108   Temp (!) 97 °F (36 1 °C) (Temporal)   Resp 18   Ht 6' 5" (1 956 m)   Wt 103 kg (227 lb)   SpO2 100%   BMI 26 92 kg/m²       PHYSICAL EXAM    Gen: NAD  CV: RRR  CHEST: Clear  ABD: soft, NT/ND  EXT: no edema  Neuro: AAO      ASSESSMENT/PLAN:  This is a 81y o  year old male here for colon cancer screening         PLAN:   Procedure: colonoscopy

## 2022-09-01 NOTE — ANESTHESIA POSTPROCEDURE EVALUATION
Post-Op Assessment Note    CV Status:  Stable  Pain Score: 0    Pain management: adequate     Mental Status:  Arousable and sleepy   Hydration Status:  Stable   PONV Controlled:  Controlled   Airway Patency:  Patent      Post Op Vitals Reviewed: Yes      Staff: CRNA         No complications documented      BP  121/72   Temp 97 6   Pulse 111   Resp 16   SpO2 99

## 2022-09-06 PROCEDURE — 88305 TISSUE EXAM BY PATHOLOGIST: CPT | Performed by: STUDENT IN AN ORGANIZED HEALTH CARE EDUCATION/TRAINING PROGRAM

## 2022-09-26 DIAGNOSIS — E11.40 TYPE 2 DIABETES MELLITUS WITH DIABETIC NEUROPATHY, WITHOUT LONG-TERM CURRENT USE OF INSULIN (HCC): Chronic | ICD-10-CM

## 2022-10-11 PROBLEM — Z00.00 MEDICARE ANNUAL WELLNESS VISIT, SUBSEQUENT: Status: RESOLVED | Noted: 2022-06-27 | Resolved: 2022-10-11

## 2022-10-25 ENCOUNTER — OFFICE VISIT (OUTPATIENT)
Dept: PODIATRY | Facility: CLINIC | Age: 72
End: 2022-10-25
Payer: COMMERCIAL

## 2022-10-25 VITALS
WEIGHT: 230.6 LBS | HEIGHT: 77 IN | HEART RATE: 88 BPM | DIASTOLIC BLOOD PRESSURE: 73 MMHG | BODY MASS INDEX: 27.23 KG/M2 | SYSTOLIC BLOOD PRESSURE: 153 MMHG

## 2022-10-25 DIAGNOSIS — E11.40 TYPE 2 DIABETES MELLITUS WITH DIABETIC NEUROPATHY, WITHOUT LONG-TERM CURRENT USE OF INSULIN (HCC): Chronic | ICD-10-CM

## 2022-10-25 DIAGNOSIS — Z89.431 S/P TRANSMETATARSAL AMPUTATION OF FOOT, RIGHT (HCC): ICD-10-CM

## 2022-10-25 DIAGNOSIS — R26.2 AMBULATORY DYSFUNCTION: Primary | ICD-10-CM

## 2022-10-25 PROBLEM — E11.621 DIABETIC ULCER OF RIGHT MIDFOOT ASSOCIATED WITH TYPE 2 DIABETES MELLITUS, WITH FAT LAYER EXPOSED (HCC): Status: RESOLVED | Noted: 2021-03-16 | Resolved: 2022-10-25

## 2022-10-25 PROBLEM — R26.9 GAIT ABNORMALITY: Status: ACTIVE | Noted: 2022-10-25

## 2022-10-25 PROBLEM — Z89.421 ACQUIRED ABSENCE OF OTHER RIGHT TOE(S) (HCC): Status: RESOLVED | Noted: 2019-08-13 | Resolved: 2022-10-25

## 2022-10-25 PROBLEM — L97.412 DIABETIC ULCER OF RIGHT MIDFOOT ASSOCIATED WITH TYPE 2 DIABETES MELLITUS, WITH FAT LAYER EXPOSED (HCC): Status: RESOLVED | Noted: 2021-03-16 | Resolved: 2022-10-25

## 2022-10-25 PROCEDURE — 99213 OFFICE O/P EST LOW 20 MIN: CPT | Performed by: PODIATRIST

## 2022-10-25 NOTE — PATIENT INSTRUCTIONS
Foot Care for People with Diabetes   WHAT YOU NEED TO KNOW:   Foot care helps protect your feet and prevent foot ulcers or sores  Long-term high blood sugar levels can damage the blood vessels and nerves in your legs and feet  This damage makes it hard to feel pressure, pain, temperature, and touch  You may not be able to feel a cut or sore, or shoes that are too tight  Foot care is needed to prevent serious problems, such as an infection or amputation  Diabetes may cause your toes to become crooked or curved under  These changes may affect the way you walk and can lead to increased pressure on your foot  The pressure can decrease blood flow to your feet  Lack of blood flow increases your risk for a foot ulcer  Do not ignore small problems, such as dry skin or small wounds  These can become life-threatening over time without proper care  DISCHARGE INSTRUCTIONS:   Call your care team provider if:   Your feet become numb, weak, or hard to move  You have pus draining from a sore on your foot  You have a wound on your foot that gets bigger, deeper, or does not heal      You see blisters, cuts, scratches, calluses, or sores on your foot  You have a fever, and your feet become red, warm, and swollen  Your toenails become thick, curled, or yellow  You find it hard to check your feet because your vision is poor  You have questions or concerns about your condition or care  Foot care:   Check your feet each day  Look at your whole foot, including the bottom, and between and under your toes  Check for wounds, corns, and calluses  Use a mirror to see the bottom of your feet  The skin on your feet may be shiny, tight, or darker than normal  Your feet may also be cold and pale  Feel your feet by running your hands along the tops, bottoms, sides, and between your toes  Redness, swelling, and warmth are signs of blood flow problems that can lead to a foot ulcer   Do not try to remove corns or calluses yourself  Wash your feet each day with soap and warm water  Do not use hot water, because this can injure your foot  Dry your feet gently with a towel after you wash them  Dry between and under your toes  Apply lotion or a moisturizer on your dry feet  Ask your care team provider what lotions are best to use  Do not put lotion or moisturizer between your toes  Moisture between your toes could lead to skin breakdown  Cut your toenails correctly  File or cut your toenails straight across  Use a soft brush to clean around your toenails  If your toenails are very thick, you may need to have a care team provider or specialist cut them  Protect your feet  Do not walk barefoot or wear your shoes without socks  Check your shoes for rocks or other objects that can hurt your feet  Wear cotton socks to help keep your feet dry  Wear socks without toe seams, or wear them with the seams inside out  Change your socks each day  Do not wear socks that are dirty or damp  Wear shoes that fit well  Wear shoes that do not rub against any area of your feet  Your shoes should be ½ to ¾ inch (1 to 2 centimeters) longer than your feet  Your shoes should also have extra space around the widest part of your feet  Walking or athletic shoes with laces or straps that adjust are best  Ask your care team provider for help to choose shoes that fit you best  Ask him or her if you need to wear an insert, orthotic, or bandage on your feet  Do not smoke  Smoking can damage your blood vessels and put you at increased risk for foot ulcers  Ask your care team provider for information if you currently smoke and need help to quit  E-cigarettes or smokeless tobacco still contain nicotine  Talk to your care team provider before you use these products  Follow up with your diabetes care team provider or foot specialist as directed: You will need to have your feet checked at least once a year   You may need a foot exam more often if you have nerve damage, foot deformities, or ulcers  Write down your questions so you remember to ask them during your visits  © Copyright [employer] 2022 Information is for End User's use only and may not be sold, redistributed or otherwise used for commercial purposes  All illustrations and images included in CareNotes® are the copyrighted property of [employer] M [employer] Inc  or [facility]  The above information is an  only  It is not intended as medical advice for individual conditions or treatments  Talk to your doctor, nurse or pharmacist before following any medical regimen to see if it is safe and effective for you

## 2022-10-25 NOTE — PROGRESS NOTES
This patient was seen on 10/25/22  My role is Foot , Ankle, and Wound Specialist    SUBJECTIVE    Chief Complaint:  Diabetic foot check     Patient ID: Michelle Coley  is a 81 y o  male  Shruti Cardoso is here with his wife s/p complicated foot flap and amputation  He's now walking in shoe, no drainage from foot  He denies problems in his custom shoes now  He states he played golf last week  He does state some balance issues which pre-date his foot amputation  The following portions of the patient's history were reviewed and updated as appropriate: allergies, current medications, past family history, past medical history, past social history, past surgical history and problem list     Review of Systems   Constitutional: Positive for activity change  Negative for appetite change, chills, diaphoresis, fatigue, fever and unexpected weight change  Skin: Negative for wound  Neurological: Positive for numbness  OBJECTIVE      /73   Pulse 88   Ht 6' 5" (1 956 m)   Wt 105 kg (230 lb 9 6 oz)   BMI 27 35 kg/m²     Foot/Ankle Musculoskeletal Exam    General      Neurological: alert      General additional comments: Transmetatarsal amputation  Large bulky, healed ALT flap intact  No sinus tracts, ulcers, infections  Physical Exam  Vitals and nursing note reviewed  Constitutional:       General: He is not in acute distress  Appearance: Normal appearance  He is not ill-appearing, toxic-appearing or diaphoretic  HENT:      Head: Normocephalic  Pulmonary:      Effort: Pulmonary effort is normal       Breath sounds: Normal breath sounds  Musculoskeletal:      Comments: Transmetatarsal amputation  Large bulky, healed ALT flap intact  No sinus tracts, ulcers, infections  Neurological:      Mental Status: He is alert  ASSESSMENT     Diagnoses and all orders for this visit:    Ambulatory dysfunction  -     Ambulatory referral to Physical Therapy;  Future    S/P transmetatarsal amputation of foot, right ([facility] )    Type 2 diabetes mellitus with diabetic neuropathy, without long-term current use of insulin ([place] [address] )         Problem List Items Addressed This Visit        Endocrine    Type 2 diabetes mellitus with diabetic neuropathy ([place] [address] ) (Chronic)       Other    Ambulatory dysfunction - Primary    Relevant Orders    Ambulatory referral to Physical Therapy    S/P transmetatarsal amputation of foot, right ([place] [address] )              PLAN    Foot precautions reviewed with patient including the need to wear protective shoegear at all times when walking including in the home, the need to check feet all surfaces daily with a mirror and report and skin breaks to podiatry, the need to apply an emollient to skin of feet daily  I am going to have him consult with PT for the balance issues

## 2022-10-31 ENCOUNTER — EVALUATION (OUTPATIENT)
Dept: PHYSICAL THERAPY | Facility: MEDICAL CENTER | Age: 72
End: 2022-10-31

## 2022-10-31 DIAGNOSIS — R26.2 AMBULATORY DYSFUNCTION: Primary | ICD-10-CM

## 2022-10-31 NOTE — PROGRESS NOTES
PT Evaluation     Today's date: 10/31/2022  Patient name: Cher Rizvi  : 1950  MRN: [id number]  Referring provider: Judith Beckman DPM  Dx:   Encounter Diagnosis     ICD-10-CM    1  Ambulatory dysfunction  R26 2 Ambulatory referral to Physical Therapy                  Assessment  Assessment details: Pt is an alert and oriented 71yo male, who is referred to out-pt PT with dx of ambulatory dysfunction s/p R transmetatarsal amputation with complicated foot flap surgery 1-2 yrs ago  Pt states 3-4 foot surgeries over the past couple of years due to a diabetic sore that wouldn't close  Pt Pt did use AD at one time, however over the past year amb without AD  Pt is diabetic, with h/o A-fib and congenital clubfoot  Pt is a golfer, and his goal is to be able to golf come spring  Pt wears protective footwear at all times over the past 3 months  Pt states he has long h/o balance issues even prior to foot surgery  Pt states he normally stands with a WBOS and has difficulty with immed standing balance with transfers  Upon examination, pt demonstrates hip and back pain, balance and gait deficits, decreased LE strength, gait deficits and functional limitations  Pt will benefit from skilled PT to address the deficits listed above and maximize function  Thank you for your referral   Impairments: abnormal muscle firing, abnormal or restricted ROM, activity intolerance, impaired balance, impaired physical strength, lacks appropriate home exercise program, pain with function, weight-bearing intolerance and poor posture   Understanding of Dx/Px/POC: good   Prognosis: good    Goals  ST  Increase LE strength by 1/2 grade to improve function  2  Pt will improve balance on noncompliant surface with EC for 30sec within 4 weeks  3  Pt will demonstrate independence with HEP in 4 weeks  LT  Pt will improve LE strength by 1 full grade to improve function  2   Pt will improve balance on noncompliant surface with EC for 30 sec with head movement in 8 weeks  3  Pt will demonstrate independence with HEP in 8 weeks  Plan  Patient would benefit from: skilled physical therapy  Planned therapy interventions: neuromuscular re-education, patient education, postural training, therapeutic exercise, therapeutic activities, strengthening, stretching, home exercise program and therapeutic training  Frequency: 2x week  Duration in weeks: 8  Plan of Care beginning date: 10/31/2022  Plan of Care expiration date: 2022  Treatment plan discussed with: patient        Subjective Evaluation    Pain  At worst pain ratin  Location: low back and hips    Social Support  Steps to enter house: yes  Stairs in house: yes   Lives with: spouse    Patient Goals  Patient goals for therapy: return to sport/leisure activities, independence with ADLs/IADLs, increased strength and improved balance          Objective     Observations     Additional Observation Details  Observation:  R foot transmetarsal amputation with healed foot flap  Mild yellow "crust" forming lat flap site which pt states his MD was aware of  Pt wears b/l diabetic shoes  Gait:  Pt amb ind without AD, WBOS, decreased step length b/l with WB through outside of feet  Neuro tests:    STS: 7  RODRIGUEZ/56 (Mod falls risk)    Transfers:  Sit-stand pt has tendency to take a couple of steps with initial transfer to get "his feet under him"    Neurological Testing     Sensation     Knee   Left Knee   Diminished: light touch     Right Knee   Diminished: light touch     Additional Neurological Details  Pt has b/l LE diabetic neuropathy from mid shin all the way to B feet  Strength/Myotome Testing     Left Knee   Flexion: 4-  Extension: 4-    Right Knee   Flexion: 4-  Extension: 4-    Additional Strength Details  Hip strength grossly 4/5 throughout    Pt has 3+ ankle DF/PF strength               Precautions: DM, R foot partial amputation with complicated foot flap, A-fib      Manuals 10/31                                                                Neuro Re-Ed             sidestepping HEP            marching HEP            Tandem gait             Bal firm FA/FT EO/EC             Bal on Foam FA EO/EC             Cone taps             rockerboard a-p/m-l             Ther Ex                                                                                                                     Ther Activity                                       Gait Training                                       Modalities

## 2022-11-02 ENCOUNTER — OFFICE VISIT (OUTPATIENT)
Dept: PHYSICAL THERAPY | Facility: MEDICAL CENTER | Age: 72
End: 2022-11-02

## 2022-11-02 ENCOUNTER — APPOINTMENT (OUTPATIENT)
Dept: PHYSICAL THERAPY | Facility: MEDICAL CENTER | Age: 72
End: 2022-11-02

## 2022-11-02 DIAGNOSIS — R26.2 AMBULATORY DYSFUNCTION: Primary | ICD-10-CM

## 2022-11-02 NOTE — PROGRESS NOTES
Daily Note     Today's date: 2022  Patient name: Colleen Saha  : 1950  MRN: [id number]  Referring provider: Efrain Conner DPM  Dx:   Encounter Diagnosis     ICD-10-CM    1  Ambulatory dysfunction  R26 2                   Subjective:  Pt states he has been performing HEP on his ramp which is harder doing it on a decline  Objective: See treatment diary below      Assessment: Tolerated treatment well  Patient demonstrated fatigue post treatment, exhibited good technique with therapeutic exercises and would benefit from continued PT  Min A required with balance on foam activities, jaci with NBOS  Pt has tendency to have excessive lat trunk movement with hurdles due to having better balance in wide tandem stance position  Plan: Continue per plan of care        Precautions: DM, R foot partial amputation with complicated foot flap, A-fib      Manuals 10/31                                                                Neuro Re-Ed             sidestepping HEP 3# x4laps           marching HEP 3# x4laps           Tandem gait  3# x4laps           Bal firm FA/FT EO/EC             Bal on Foam FA EO/EC  30"x2ea           Cone taps  x2laps           rockerboard a-p/m-l  x20ea dyn/ x1'ea static            AP/ML hurdles  x4laps ea           Backward walking                                                                                                        Ther Activity                                       Gait Training                                       Modalities

## 2022-11-04 ENCOUNTER — APPOINTMENT (OUTPATIENT)
Dept: PHYSICAL THERAPY | Facility: MEDICAL CENTER | Age: 72
End: 2022-11-04

## 2022-11-09 ENCOUNTER — OFFICE VISIT (OUTPATIENT)
Dept: PHYSICAL THERAPY | Facility: MEDICAL CENTER | Age: 72
End: 2022-11-09

## 2022-11-09 DIAGNOSIS — R26.2 AMBULATORY DYSFUNCTION: Primary | ICD-10-CM

## 2022-11-09 NOTE — PROGRESS NOTES
Daily Note     Today's date: 2022  Patient name: Joann Romero  : 1950  MRN: [id number]  Referring provider: Manuel Lan DPM  Dx:   Encounter Diagnosis     ICD-10-CM    1  Ambulatory dysfunction  R26 2                   Subjective:  Pt states he noticed a wound on the tip of his R foot yesterday  He doesn't remember hitting anything with his foot, and is concerned it may be rubbing the inside of his shoe from being outside doing yardwork  Objective: See treatment diary below    Wound ant portion of R medial foot measuring 1cm wide with redness surrounding the wound  Assessment: Tolerated treatment well  Patient demonstrated fatigue post treatment, exhibited good technique with therapeutic exercises and would benefit from continued PT  Advised pt to call MD regarding wound on his foot  Pt also aware to inspect it throughout the day for signs of rubbing  Plan: Continue per plan of care        Precautions: DM, R foot partial amputation with complicated foot flap, A-fib      Manuals 10/31                                                                Neuro Re-Ed            sidestepping HEP 3# x4laps 3# x4laps          marching HEP 3# x4laps 3# x4laps          Tandem gait  3# x4laps 3# x4laps          Bal firm FA/FT EO/EC   Foam with perts 30"x2 EO only          Bal on Foam FA EO/EC  30"x2ea 30"x2ea          Cone taps  x2laps x3laps          rockerboard a-p/m-l  x20ea dyn/ x1'ea static  x20ea dyn, x1'ea static          AP/ML hurdles  x4laps ea x4laps ea          Backward walking   3# x4laps          Sidestepping on foam   3# x3laps                                                                                        Ther Activity                                       Gait Training                                       Modalities

## 2022-11-11 ENCOUNTER — OFFICE VISIT (OUTPATIENT)
Dept: PHYSICAL THERAPY | Facility: MEDICAL CENTER | Age: 72
End: 2022-11-11

## 2022-11-11 DIAGNOSIS — R26.2 AMBULATORY DYSFUNCTION: Primary | ICD-10-CM

## 2022-11-11 NOTE — PROGRESS NOTES
Daily Note     Today's date: 2022  Patient name: Mortimer Retort  : 1950  MRN: [id number]  Referring provider: Nishant Dacosta DPM  Dx:   Encounter Diagnosis     ICD-10-CM    1  Ambulatory dysfunction  R26 2                   Subjective:  Pt states no change in in wound and did not call MD yet  Pt states his wife has been inspecting it and applying neosporin and bandages daily  Objective: See treatment diary below    Wound ant portion of R medial foot measuring 1cm wide with redness surrounding the wound  Mild redness in rectangle shape surrounding the 1cm open wound (identicle to white part of bandaid)  Cont to advise pt to call MD regarding open wound issue  Pt appears very sweated following PT session today stating he feels challenged by activities performed  Mod LOB with addition of ball toss on foam today  Assessment: Tolerated treatment well  Patient demonstrated fatigue post treatment, exhibited good technique with therapeutic exercises and would benefit from continued PT  Initiated tidal tank with gait activities to throw off his balance and shift his COG  Plan: Continue per plan of care        Precautions: DM, R foot partial amputation with complicated foot flap, A-fib      Manuals 10/31                                                                Neuro Re-Ed           sidestepping HEP 3# x4laps 3# x4laps 3# x4laps         marching HEP 3# x4laps 3# x4laps With tidal tank x4 laps         Tandem gait  3# x4laps 3# x4laps 3# x4laps         Bal firm FA/FT EO/EC   Foam with perts 30"x2 EO only Foam w/ perts 30"x2 FA/EO only         Bal on Foam FA EO/EC  30"x2ea 30"x2ea 30"x2ea         Cone taps  x2laps x3laps 3# x3laps         rockerboard a-p/m-l  x20ea dyn/ x1'ea static  x20ea dyn, x1'ea static x20ea dyn, x30" x1' ea static         AP/ML hurdles  x4laps ea x4laps ea 3# x4laps ea         Backward walking   3# x4laps  fwd/bwdW/ tidal tank x3laps Sidestepping on foam   3# x3laps 3# Serjio Mesa toss on foam ( up against wall for safety)    x3' multi direction                                                                          Ther Activity                                       Gait Training                                       Modalities

## 2022-11-15 ENCOUNTER — TELEPHONE (OUTPATIENT)
Dept: PODIATRY | Facility: CLINIC | Age: 72
End: 2022-11-15

## 2022-11-15 NOTE — TELEPHONE ENCOUNTER
Caller: Patient    Doctor: Elder    Reason for call: Ulcer on foot not healing    Call back#: [phone]

## 2022-11-16 ENCOUNTER — OFFICE VISIT (OUTPATIENT)
Dept: PHYSICAL THERAPY | Facility: MEDICAL CENTER | Age: 72
End: 2022-11-16

## 2022-11-16 DIAGNOSIS — R26.2 AMBULATORY DYSFUNCTION: Primary | ICD-10-CM

## 2022-11-16 NOTE — PROGRESS NOTES
Daily Note     Today's date: 2022  Patient name: Shaji Sprague  : 1950  MRN: [id number]  Referring provider: Earline Dick DPM  Dx:   Encounter Diagnosis     ICD-10-CM    1  Ambulatory dysfunction  R26 2                      Subjective:  Pt states he sees MD on  for wound care/assessment  Upon presentation today, the open wound is no longer red and appears to be healing compared to last week  Pt cont to keep it covered with a bandage while wearing shoes, but lets air get to it when he's sitting at home  Pt states he feels the exercises he does in PT really seem to be helping because he feels his balance has improved  Objective: See treatment diary below      Assessment: Tolerated treatment well  Patient demonstrated fatigue post treatment, exhibited good technique with therapeutic exercises and would benefit from continued PT  Pt has improved balance with WBOS tandem stance with ball toss on foam          Plan: Continue per plan of care        Precautions: DM, R foot partial amputation with complicated foot flap, A-fib      Manuals 10/31                                                                Neuro Re-Ed          sidestepping HEP 3# x4laps 3# x4laps 3# x4laps 3# x4laps        marching HEP 3# x4laps 3# x4laps With tidal tank x4 laps 3# x4laps with tidal tank        Tandem gait  3# x4laps 3# x4laps 3# x4laps 3# x4laps ea firm and foam        Bal firm FA/FT EO/EC   Foam with perts 30"x2 EO only Foam w/ perts 30"x2 FA/EO only Foam w/perts 30"x2 FA/EO only        Bal on Foam FA EO/EC  30"x2ea 30"x2ea 30"x2ea 30"x2ea        Cone taps  x2laps x3laps 3# x3laps 3# x3laps        rockerboard a-p/m-l  x20ea dyn/ x1'ea static  x20ea dyn, x1'ea static x20ea dyn, x30" x1' ea static x20ea dyn, 30"x1 ea static         AP/ML hurdles  x4laps ea x4laps ea 3# x4laps ea 3# x4laps        Backward walking   3# x4laps  fwd/bwdW/ tidal tank x3laps Fwd/bwd w/tidal tank x3laps Sidestepping on foam   3# x3laps 3# x3laps 3# Michelene Senters toss on foam ( up against wall for safety)    x3' multi direction x3' multi direction        Step ups on BOSU     3# x20ea                                                            Ther Activity                                       Gait Training                                       Modalities

## 2022-11-18 ENCOUNTER — OFFICE VISIT (OUTPATIENT)
Dept: PHYSICAL THERAPY | Facility: MEDICAL CENTER | Age: 72
End: 2022-11-18

## 2022-11-18 DIAGNOSIS — R26.2 AMBULATORY DYSFUNCTION: Primary | ICD-10-CM

## 2022-11-18 NOTE — PROGRESS NOTES
Daily Note     Today's date: 2022  Patient name: Dev Berry  : 1950  MRN: [id number]  Referring provider: Jeannie Palomo DPM  Dx:   Encounter Diagnosis     ICD-10-CM    1  Ambulatory dysfunction  R26 2                      Subjective:  Pt states he feels his wound cont to look better, and has f/u with MD on Monday  Pt states his hips and back are stiff upon presentation due to the cold weather  Objective: See treatment diary below      Assessment: Tolerated treatment well  Patient demonstrated fatigue post treatment, exhibited good technique with therapeutic exercises and would benefit from continued PT  Added weighted ball to ball toss, which pt had minimal increased difficulty with  Plan: Continue per plan of care  RA next week       Precautions: DM, R foot partial amputation with complicated foot flap, A-fib      Manuals 10/31                                                                Neuro Re-Ed         sidestepping HEP 3# x4laps 3# x4laps 3# x4laps 3# x4laps D/C       marching HEP 3# x4laps 3# x4laps With tidal tank x4 laps 3# x4laps with tidal tank 3# x4laps with tidal tank       Tandem gait  3# x4laps 3# x4laps 3# x4laps 3# x4laps ea firm and foam 3# x4laps on foam       Bal firm FA/FT EO/EC   Foam with perts 30"x2 EO only Foam w/ perts 30"x2 FA/EO only Foam w/perts 30"x2 FA/EO only Foam w/perts 30"x2ea FA/EO only       Bal on Foam FA EO/EC  30"x2ea 30"x2ea 30"x2ea 30"x2ea 30"x2ea       Cone taps  x2laps x3laps 3# x3laps 3# x3laps 3# x3laps       rockerboard a-p/m-l  x20ea dyn/ x1'ea static  x20ea dyn, x1'ea static x20ea dyn, x30" x1' ea static x20ea dyn, 30"x1 ea static  x20ea dyn, x1'ea static       AP/ML hurdles  x4laps ea x4laps ea 3# x4laps ea 3# x4laps 3# x4laps       Backward walking   3# x4laps  fwd/bwdW/ tidal tank x3laps Fwd/bwd w/tidal tank x3laps Fwd/bwd with tidal tank x4laps       Sidestepping on foam   3# x3laps 3# x3laps 3# x3laps 3# Scott Sung toss on foam ( up against wall for safety)    x3' multi direction x3' multi direction x3' multi direction with weighted ball       Step ups on BOSU     3# x20ea 3# x20ea       squats      x20                                              Ther Activity                                       Gait Training                                       Modalities

## 2022-11-21 ENCOUNTER — EVALUATION (OUTPATIENT)
Dept: PHYSICAL THERAPY | Facility: MEDICAL CENTER | Age: 72
End: 2022-11-21

## 2022-11-21 DIAGNOSIS — R26.2 AMBULATORY DYSFUNCTION: Primary | ICD-10-CM

## 2022-11-21 NOTE — PROGRESS NOTES
PT Re-Evaluation     Today's date: 2022  Patient name: Anna Currie  : 1950  MRN: [id number]  Referring provider: Lacy Cho DPM  Dx:   Encounter Diagnosis     ICD-10-CM    1  Ambulatory dysfunction  R26 2                      Assessment  Assessment details: Pt has attended 7 visits of skilled PT over the past 4 weeks  Pt has a recent open wound on R foot that is healing and appears closed upon presentation today  Pt has another scab on lat foot with blood stained on his sock upon presentation today  Pt had a f/u with surgeon today but states he cancelled it because wound was healing  Advised pt to reschedule appt due to possible shoe needing adjustment from rubbing  Pt states he does feel improved balance and reaction time with transfers and his ADl's, but is having more LBP and hip pain with exercises  Pt states he still feels limited with activities involving trunk rot and twisting movements (ie golf swing)  Pt does feel more balanced with WBOS or wide tandem stance  Upon reassessment, pt demonstrates continued hip and back pain and tightness, improved balance on RODRIGUEZ Balance assessment and STS testing, improved gait and LE strength, however recreational limitations persist   Pt will benefit from continued skilled PT to address the deficits listed above and maximize function  Thank you for your referral   Impairments: abnormal muscle firing, abnormal or restricted ROM, activity intolerance, impaired balance, impaired physical strength, pain with function, weight-bearing intolerance and poor posture   Understanding of Dx/Px/POC: good   Prognosis: good    Goals  ST  Increase LE strength by 1/2 grade to improve function  -partially met as of   2  Pt will improve balance on noncompliant surface with EC for 30sec within 4 weeks  -met as of   3  Pt will demonstrate independence with HEP in 4 weeks  met as of     LT    Pt will improve LE strength by 1 full grade to improve function  2  Pt will improve balance on noncompliant surface with EC for 30 sec with head movement in 8 weeks  3  Pt will demonstrate independence with HEP in 8 weeks  Plan  Patient would benefit from: skilled physical therapy  Planned therapy interventions: neuromuscular re-education, patient education, postural training, therapeutic exercise, therapeutic activities, strengthening, stretching, home exercise program and therapeutic training  Frequency: 2x week  Duration in weeks: 5  Plan of Care beginning date: 2022  Plan of Care expiration date: 2022  Treatment plan discussed with: patient        Subjective Evaluation    Pain  At worst pain ratin  Location: low back and hips    Social Support  Steps to enter house: yes  Stairs in house: yes   Lives with: spouse    Patient Goals  Patient goals for therapy: return to sport/leisure activities, independence with ADLs/IADLs, increased strength and improved balance          Objective     Observations     Additional Observation Details  Observation:  R foot transmetarsal amputation with healed foot flap  Mild yellow/red "crust" forming lat flap site which pt states his MD was aware of   (+) blood/seeping from lat foot onto sock  1cm wound ant ant dorsum of flap closed and healing  Pt wears b/l diabetic shoes  Gait:  Pt amb ind without AD, WBOS, decreased step length b/l with WB through outside of feet  Neuro tests:    STS: 9  RODRIGUEZ/56 (Low falls risk)    Transfers:  Sit-stand pt has tendency to take a couple of steps with initial transfer to get "his feet under him"-persists as of     Neurological Testing     Sensation     Knee   Left Knee   Diminished: light touch     Right Knee   Diminished: light touch     Additional Neurological Details  Pt has b/l LE diabetic neuropathy from mid shin all the way to B feet      Strength/Myotome Testing     Left Knee   Flexion: 4+  Extension: 4+    Right Knee   Flexion: 4+  Extension: 4+    Additional Strength Details                   Precautions: DM, R foot partial amputation with complicated foot flap, A-fib    Manuals 10/31            11/21          RA              BG          Douglass  39/56            42/56          STS  7            9                                 Neuro Re-Ed   11/2 11/9 11/11 11/16 11/18 11/21         sidestepping HEP 3# x4laps 3# x4laps 3# x4laps 3# x4laps D/C           marching HEP 3# x4laps 3# x4laps With tidal tank x4 laps 3# x4laps with tidal tank 3# x4laps with tidal tank           Tandem gait   3# x4laps 3# x4laps 3# x4laps 3# x4laps ea firm and foam 3# x4laps on foam           Bal firm FA/FT EO/EC     Foam with perts 30"x2 EO only Foam w/ perts 30"x2 FA/EO only Foam w/perts 30"x2 FA/EO only Foam w/perts 30"x2ea FA/EO only  foam w/perts 30"x2ea         Bal on Foam FA EO/EC   30"x2ea 30"x2ea 30"x2ea 30"x2ea 30"x2ea           Cone taps   x2laps x3laps 3# x3laps 3# x3laps 3# x3laps           rockerboard a-p/m-l   x20ea dyn/ x1'ea static  x20ea dyn, x1'ea static x20ea dyn, x30" x1' ea static x20ea dyn, 30"x1 ea static  x20ea dyn, x1'ea static           AP/ML hurdles   x4laps ea x4laps ea 3# x4laps ea 3# x4laps 3# x4laps           Backward walking     3# x4laps  fwd/bwdW/ tidal tank x3laps Fwd/bwd w/tidal tank x3laps Fwd/bwd with tidal tank x4laps           Sidestepping on foam     3# x3laps 3# x3laps 3# x3laps 3# x3laps           Ball toss on foam ( up against wall for safety)       x3' multi direction x3' multi direction x3' multi direction with weighted ball  x3' multidirection with weighted ball         Step ups on BOSU         3# x20ea 3# x20ea           squats           x20                                    trunk rot/golf swing              10# x10          Trunk twist with pivot              x10         SKTC              20"x4ea         LTR              10"x10ea                                 Gait Training                                                                       Modalities

## 2022-11-22 ENCOUNTER — OFFICE VISIT (OUTPATIENT)
Dept: PODIATRY | Facility: CLINIC | Age: 72
End: 2022-11-22

## 2022-11-22 VITALS — WEIGHT: 233 LBS | BODY MASS INDEX: 27.51 KG/M2 | HEIGHT: 77 IN

## 2022-11-22 DIAGNOSIS — E11.40 TYPE 2 DIABETES MELLITUS WITH DIABETIC NEUROPATHY, WITHOUT LONG-TERM CURRENT USE OF INSULIN (HCC): Primary | Chronic | ICD-10-CM

## 2022-11-22 DIAGNOSIS — Z89.431 S/P TRANSMETATARSAL AMPUTATION OF FOOT, RIGHT (HCC): ICD-10-CM

## 2022-11-22 NOTE — PROGRESS NOTES
This patient was seen on 11/22/22  My role is Foot , Ankle, and Wound Specialist    SUBJECTIVE    Chief Complaint:  Skin irritation on amputation stump     Patient ID: Svitlana Velazquez  is a 81 y o  male  Marmariah Hall is here with his wife s/p complicated foot flap and amputation  He's been going to a lot of PT and notes some bloody drainage from the lateral foot stump  The following portions of the patient's history were reviewed and updated as appropriate: allergies, current medications, past family history, past medical history, past social history, past surgical history and problem list     Review of Systems   Constitutional: Positive for activity change  Negative for appetite change, chills, diaphoresis, fatigue, fever and unexpected weight change  Skin: Positive for wound  Neurological: Positive for numbness  OBJECTIVE      Ht 6' 5" (1 956 m) Comment: verbal  Wt 106 kg (233 lb)   BMI 27 63 kg/m²     Foot/Ankle Musculoskeletal Exam    General    Neurological: alert  General additional comments: Transmetatarsal amputation  Large bulky, healed ALT flap intact  No sinus tracts, ulcers, infections  Physical Exam  Vitals and nursing note reviewed  Constitutional:       General: He is not in acute distress  Appearance: Normal appearance  He is not ill-appearing, toxic-appearing or diaphoretic  HENT:      Head: Normocephalic  Pulmonary:      Effort: Pulmonary effort is normal       Breath sounds: Normal breath sounds  Musculoskeletal:      Comments: Transmetatarsal amputation  Large bulky, healed ALT flap intact  No sinus tracts, ulcers, infections  Skin:     Comments: I note a small bleeding callous lateral Right TMA stump  It's about 1 0cm diameter  The open area under the callous is very superficial  No signs of infection  Neurological:      Mental Status: He is alert               ASSESSMENT     Diagnoses and all orders for this visit:    Type 2 diabetes mellitus with diabetic neuropathy, without long-term current use of insulin ([facility] )  -     Diabetic Shoe    S/P transmetatarsal amputation of foot, right ([facility] )         Problem List Items Addressed This Visit        Endocrine    Type 2 diabetes mellitus with diabetic neuropathy ([facility] ) - Primary (Chronic)    Relevant Orders    Diabetic Shoe       Other    S/P transmetatarsal amputation of foot, right ([facility])           PLAN  I modified the custom insole in the shoe by grinding out the area adjacent to the callous to reduce pressure  I wrote a prescription for him to have the  add an external buttress to the shoe to prevent the foot rolling into varus  I recommended a bandaid at all times to the area  I will check the foot in 3 weeks

## 2022-11-23 ENCOUNTER — OFFICE VISIT (OUTPATIENT)
Dept: PHYSICAL THERAPY | Facility: MEDICAL CENTER | Age: 72
End: 2022-11-23

## 2022-11-23 DIAGNOSIS — R26.2 AMBULATORY DYSFUNCTION: Primary | ICD-10-CM

## 2022-11-23 NOTE — PROGRESS NOTES
Daily Note     Today's date: 2022  Patient name: Paul Marino  : 1950  MRN: [id number]  Referring provider: Solo Mccurdy DPM  Dx:   Encounter Diagnosis     ICD-10-CM    1  Ambulatory dysfunction  R26 2                      Subjective: Pt states no new complaints post RA last visit  Pt did go to see MD, and states he "scraped" the scab off the side of his foot and s sending him to get his shoe adjusted due to rubbing  Objective: See treatment diary below      Assessment: Tolerated treatment well  Patient demonstrated fatigue post treatment, exhibited good technique with therapeutic exercises and would benefit from continued PT  Initiated PNF patterns with TB, and pt required VC's to increase trunk rotation and pivoting on opposite foot  Pt states feeling challenged by that, and felt like it would help him with his gold swing  Plan: Continue per plan of care        Precautions: DM, R foot partial amputation with complicated foot flap, A-fib    Manuals 10/31            11/21          RA              BG          Douglass  39/56            42/56          STS  7            9                                 Neuro Re-Ed          sidestepping HEP 3# x4laps 3# x4laps 3# x4laps 3# x4laps D/C           marching HEP 3# x4laps 3# x4laps With tidal tank x4 laps 3# x4laps with tidal tank 3# x4laps with tidal tank    3# x4laps with tidal tank       Tandem gait   3# x4laps 3# x4laps 3# x4laps 3# x4laps ea firm and foam 3# x4laps on foam    3# x4laps on foam       Bal firm FA/FT EO/EC     Foam with perts 30"x2 EO only Foam w/ perts 30"x2 FA/EO only Foam w/perts 30"x2 FA/EO only Foam w/perts 30"x2ea FA/EO only  foam w/perts 30"x2ea  foam w/perts 30"x2ea       Bal on Foam FA EO/EC   30"x2ea 30"x2ea 30"x2ea 30"x2ea 30"x2ea    30"x2ea       Cone taps   x2laps x3laps 3# x3laps 3# x3laps 3# x3laps    3# x3laps       rockerboard a-p/m-l   x20ea dyn/ x1'ea static  x20ea dyn, x1'ea static x20ea dyn, x30" x1' ea static x20ea dyn, 30"x1 ea static  x20ea dyn, x1'ea static    x20ea dyn, x1' ea static       AP/ML hurdles   x4laps ea x4laps ea 3# x4laps ea 3# x4laps 3# x4laps    3# x4laps ea       Backward walking     3# x4laps  fwd/bwdW/ tidal tank x3laps Fwd/bwd w/tidal tank x3laps Fwd/bwd with tidal tank x4laps    fwd/bwd with tidal tank x4laps       Sidestepping on foam     3# x3laps 3# x3laps 3# x3laps 3# x3laps    3# x4laps       Ball toss on foam ( up against wall for safety)       x3' multi direction x3' multi direction x3' multi direction with weighted ball  x3' multidirection with weighted ball  x3' multidirectional w/ weight ball       Step ups on BOSU         3# x20ea 3# x20ea    3# x20ea       squats           x20                                    trunk rot/golf swing              10# x10          Trunk twist with pivot              x10         SKTC              20"x4ea  20"x4ea       LTR              10"x10ea  10"x10ea        PNF diagonal patterns                GTB x10ea       Gait Training                                                                       Modalities

## 2022-11-28 ENCOUNTER — APPOINTMENT (OUTPATIENT)
Dept: PHYSICAL THERAPY | Facility: MEDICAL CENTER | Age: 72
End: 2022-11-28

## 2022-11-30 ENCOUNTER — APPOINTMENT (OUTPATIENT)
Dept: PHYSICAL THERAPY | Facility: MEDICAL CENTER | Age: 72
End: 2022-11-30

## 2022-12-05 ENCOUNTER — APPOINTMENT (OUTPATIENT)
Dept: PHYSICAL THERAPY | Facility: MEDICAL CENTER | Age: 72
End: 2022-12-05

## 2022-12-07 ENCOUNTER — OFFICE VISIT (OUTPATIENT)
Dept: PHYSICAL THERAPY | Facility: MEDICAL CENTER | Age: 72
End: 2022-12-07

## 2022-12-07 DIAGNOSIS — R26.2 AMBULATORY DYSFUNCTION: Primary | ICD-10-CM

## 2022-12-07 NOTE — PROGRESS NOTES
Daily Note     Today's date: 2022  Patient name: Rosas Loaiza  : 1950  MRN: [id number]  Referring provider: Guerita Patel DPM  Dx:   Encounter Diagnosis     ICD-10-CM    1  Ambulatory dysfunction  R26 2                      Subjective: Pt states he was able to walk approx 1 mile into the woods on uneven "mushy" terrain last week every day and states he felt his knees were throbbing at the end of the day  Pt states so much twisting occurs in his knees  Pt denies any LOB and did not require use of walking stick  Objective: See treatment diary below      Assessment: Tolerated treatment well  Patient demonstrated fatigue post treatment, exhibited good technique with therapeutic exercises and would benefit from continued PT  Pt cont to state his knees feel "mushy" with PNF patterns and pivoting  Plan: Continue per plan of care        Precautions: DM, R foot partial amputation with complicated foot flap, A-fib    Manuals 10/31            11/21          RA              BG          Douglass  39/56            42/56          STS  7            9                                 Neuro Re-Ed        sidestepping HEP 3# x4laps 3# x4laps 3# x4laps 3# x4laps D/C           marching HEP 3# x4laps 3# x4laps With tidal tank x4 laps 3# x4laps with tidal tank 3# x4laps with tidal tank    3# x4laps with tidal tank  3# x4laps w/tidal tank     Tandem gait   3# x4laps 3# x4laps 3# x4laps 3# x4laps ea firm and foam 3# x4laps on foam    3# x4laps on foam  3# x4laps     Bal firm FA/FT EO/EC     Foam with perts 30"x2 EO only Foam w/ perts 30"x2 FA/EO only Foam w/perts 30"x2 FA/EO only Foam w/perts 30"x2ea FA/EO only  foam w/perts 30"x2ea  foam w/perts 30"x2ea  foam w/perts 30"x2ea     Bal on Foam FA EO/EC   30"x2ea 30"x2ea 30"x2ea 30"x2ea 30"x2ea    30"x2ea  30"x2ea     Cone taps   x2laps x3laps 3# x3laps 3# x3laps 3# x3laps    3# x3laps  3# x4laps     rockerboard a-p/m-l   x20ea dyn/ x1'ea static  x20ea dyn, x1'ea static x20ea dyn, x30" x1' ea static x20ea dyn, 30"x1 ea static  x20ea dyn, x1'ea static    x20ea dyn, x1' ea static  x20ea dyn, static x1'ea       AP/ML hurdles   x4laps ea x4laps ea 3# x4laps ea 3# x4laps 3# x4laps    3# x4laps ea  3# x4laps     Backward walking     3# x4laps  fwd/bwdW/ tidal tank x3laps Fwd/bwd w/tidal tank x3laps Fwd/bwd with tidal tank x4laps    fwd/bwd with tidal tank x4laps  fwd/bwd w/ tidal tank     Sidestepping on foam     3# x3laps 3# x3laps 3# x3laps 3# x3laps    3# x4laps  3# x4laps     Ball toss on foam ( up against wall for safety)       x3' multi direction x3' multi direction x3' multi direction with weighted ball  x3' multidirection with weighted ball  x3' multidirectional w/ weight ball  x3' multidirectional w/ weighted ball     Step ups on BOSU         3# x20ea 3# x20ea    3# x20ea  3# x20ea     squats           x20                                    trunk rot/golf swing              10# x10          Trunk twist with pivot              x10         SKTC              20"x4ea  20"x4ea       LTR              10"x10ea  10"x10ea        PNF diagonal patterns                GTB x10ea  GTB x10ea     Gait Training                                                                       Modalities

## 2022-12-09 ENCOUNTER — OFFICE VISIT (OUTPATIENT)
Dept: PHYSICAL THERAPY | Facility: MEDICAL CENTER | Age: 72
End: 2022-12-09

## 2022-12-09 DIAGNOSIS — R26.2 AMBULATORY DYSFUNCTION: Primary | ICD-10-CM

## 2022-12-09 NOTE — PROGRESS NOTES
Daily Note     Today's date: 2022  Patient name: Yolanda Maki  : 1950  MRN: [id number]  Referring provider: Deepak Christensen DPM  Dx:   Encounter Diagnosis     ICD-10-CM    1  Ambulatory dysfunction  R26 2                      Subjective: Pt states he feels he has made sig progress with his balance since starting PT  Pt states he thinks the band exercises are really going to help him with his golf swing  Objective: See treatment diary below      Assessment: Tolerated treatment well  Patient exhibited good technique with therapeutic exercises  Pt states he is pleased with his balance and feels he is "as good as he's going to get"  Discussed with pt his reaction time and decreased assist and UE support with all NR activities has sig improved  If he feels he needs more balance retraining in the spring with golfing he was advised to return to PT          Plan: D/C to ind HEP     Precautions: DM, R foot partial amputation with complicated foot flap, A-fib    Manuals 10/31            11/21          RA              BG          Douglass  39/56            42/56          STS  7            9                                 Neuro Re-Ed      sidestepping HEP 3# x4laps 3# x4laps 3# x4laps 3# x4laps D/C           marching HEP 3# x4laps 3# x4laps With tidal tank x4 laps 3# x4laps with tidal tank 3# x4laps with tidal tank    3# x4laps with tidal tank  3# x4laps w/tidal tank  3# x4laps with tidal tank   Tandem gait   3# x4laps 3# x4laps 3# x4laps 3# x4laps ea firm and foam 3# x4laps on foam    3# x4laps on foam  3# x4laps  3# x4laps   Bal firm FA/FT EO/EC     Foam with perts 30"x2 EO only Foam w/ perts 30"x2 FA/EO only Foam w/perts 30"x2 FA/EO only Foam w/perts 30"x2ea FA/EO only  foam w/perts 30"x2ea  foam w/perts 30"x2ea  foam w/perts 30"x2ea  foam with perts 30"x2   Bal on Foam FA EO/EC   30"x2ea 30"x2ea 30"x2ea 30"x2ea 30"x2ea    30"x2ea  30"x2ea  30"x2ea [place]one taps   x2laps x3laps 3# x3laps 3# x3laps 3# x3laps    3# x3laps  3# x4laps  NP   rockerboard a-p/m-l   x20ea dyn/ x1'ea static  x20ea dyn, x1'ea static x20ea dyn, x30" x1' ea static x20ea dyn, 30"x1 ea static  x20ea dyn, x1'ea static    x20ea dyn, x1' ea static  x20ea dyn, static x1'ea    x20 dyn, x30"static   AP/ML hurdles   x4laps ea x4laps ea 3# x4laps ea 3# x4laps 3# x4laps    3# x4laps ea  3# x4laps  3# x4laps   Backward walking     3# x4laps  fwd/bwdW/ tidal tank x3laps Fwd/bwd w/tidal tank x3laps Fwd/bwd with tidal tank x4laps    fwd/bwd with tidal tank x4laps  fwd/bwd w/ tidal tank  fwd/bwd with tidal tank x4laps   Sidestepping on foam     3# x3laps 3# x3laps 3# x3laps 3# x3laps    3# x4laps  3# x4laps  3# x4laps   Ball toss on foam ( up against wall for safety)       x3' multi direction x3' multi direction x3' multi direction with weighted ball  x3' multidirection with weighted ball  x3' multidirectional w/ weight ball  x3' multidirectional w/ weighted ball  x3' multi directional w/ weighted ball   Step ups on BOSU         3# x20ea 3# x20ea    3# x20ea  3# x20ea     squats           x20                                    trunk rot/golf swing              10# x10          Trunk twist with pivot              x10         SKTC              20"x4ea  20"x4ea       LTR              10"x10ea  10"x10ea        PNF diagonal patterns                GTB x10ea  GTB x10ea  GTB x10ea   Gait Training                                                                       Modalities

## 2022-12-12 ENCOUNTER — APPOINTMENT (OUTPATIENT)
Dept: PHYSICAL THERAPY | Facility: MEDICAL CENTER | Age: 72
End: 2022-12-12

## 2022-12-13 DIAGNOSIS — E11.40 TYPE 2 DIABETES MELLITUS WITH DIABETIC NEUROPATHY, UNSPECIFIED WHETHER LONG TERM INSULIN USE (HCC): Chronic | ICD-10-CM

## 2022-12-13 RX ORDER — SITAGLIPTIN 100 MG/1
100 TABLET, FILM COATED ORAL DAILY
Qty: 132 TABLET | Refills: 1 | Status: SHIPPED | OUTPATIENT
Start: 2022-12-13

## 2022-12-13 RX ORDER — SITAGLIPTIN 100 MG/1
100 TABLET, FILM COATED ORAL DAILY
Qty: 132 TABLET | Refills: 2 | Status: SHIPPED | OUTPATIENT
Start: 2022-12-13

## 2022-12-14 ENCOUNTER — APPOINTMENT (OUTPATIENT)
Dept: PHYSICAL THERAPY | Facility: MEDICAL CENTER | Age: 72
End: 2022-12-14

## 2022-12-20 ENCOUNTER — RA CDI HCC (OUTPATIENT)
Dept: OTHER | Facility: HOSPITAL | Age: 72
End: 2022-12-20

## 2022-12-20 NOTE — PROGRESS NOTES
[facility]  coding opportunities       Chart reviewed, no opportunity found:   [address]        Patients Insurance     Medicare Insurance: [employer]

## 2022-12-29 ENCOUNTER — OFFICE VISIT (OUTPATIENT)
Dept: FAMILY MEDICINE CLINIC | Facility: CLINIC | Age: 72
End: 2022-12-29

## 2022-12-29 VITALS
DIASTOLIC BLOOD PRESSURE: 78 MMHG | SYSTOLIC BLOOD PRESSURE: 142 MMHG | TEMPERATURE: 97.6 F | HEIGHT: 77 IN | RESPIRATION RATE: 18 BRPM | OXYGEN SATURATION: 98 % | BODY MASS INDEX: 27.39 KG/M2 | HEART RATE: 74 BPM | WEIGHT: 232 LBS

## 2022-12-29 DIAGNOSIS — N40.1 BENIGN PROSTATIC HYPERPLASIA WITH WEAK URINARY STREAM: Chronic | ICD-10-CM

## 2022-12-29 DIAGNOSIS — J30.0 VASOMOTOR RHINITIS: Chronic | ICD-10-CM

## 2022-12-29 DIAGNOSIS — I10 ESSENTIAL HYPERTENSION: Chronic | ICD-10-CM

## 2022-12-29 DIAGNOSIS — I48.91 ATRIAL FIBRILLATION, UNSPECIFIED TYPE (HCC): ICD-10-CM

## 2022-12-29 DIAGNOSIS — I48.91 ATRIAL FIBRILLATION WITH RVR (HCC): ICD-10-CM

## 2022-12-29 DIAGNOSIS — U07.1 COVID-19: ICD-10-CM

## 2022-12-29 DIAGNOSIS — E11.40 TYPE 2 DIABETES MELLITUS WITH DIABETIC NEUROPATHY, UNSPECIFIED WHETHER LONG TERM INSULIN USE (HCC): Chronic | ICD-10-CM

## 2022-12-29 DIAGNOSIS — E11.40 TYPE 2 DIABETES MELLITUS WITH DIABETIC NEUROPATHY, WITHOUT LONG-TERM CURRENT USE OF INSULIN (HCC): Primary | Chronic | ICD-10-CM

## 2022-12-29 DIAGNOSIS — R80.9 POSITIVE FOR MICROALBUMINURIA: Chronic | ICD-10-CM

## 2022-12-29 DIAGNOSIS — E78.5 HYPERLIPIDEMIA, UNSPECIFIED HYPERLIPIDEMIA TYPE: Chronic | ICD-10-CM

## 2022-12-29 DIAGNOSIS — R39.12 BENIGN PROSTATIC HYPERPLASIA WITH WEAK URINARY STREAM: Chronic | ICD-10-CM

## 2022-12-29 LAB — SL AMB POCT HEMOGLOBIN AIC: 9.1 (ref ?–6.5)

## 2022-12-29 RX ORDER — APIXABAN 5 MG/1
5 TABLET, FILM COATED ORAL 2 TIMES DAILY
Qty: 60 TABLET | Refills: 11 | Status: SHIPPED | OUTPATIENT
Start: 2022-12-29

## 2022-12-29 RX ORDER — SITAGLIPTIN 100 MG/1
100 TABLET, FILM COATED ORAL DAILY
Qty: 132 TABLET | Refills: 2 | Status: SHIPPED | OUTPATIENT
Start: 2022-12-29

## 2022-12-29 RX ORDER — GLIMEPIRIDE 2 MG/1
TABLET ORAL
Qty: 135 TABLET | Refills: 6 | Status: SHIPPED | OUTPATIENT
Start: 2022-12-29

## 2022-12-29 RX ORDER — LISINOPRIL 40 MG/1
40 TABLET ORAL DAILY
Qty: 90 TABLET | Refills: 3 | Status: SHIPPED | OUTPATIENT
Start: 2022-12-29

## 2022-12-29 RX ORDER — TAMSULOSIN HYDROCHLORIDE 0.4 MG/1
0.4 CAPSULE ORAL
Qty: 90 CAPSULE | Refills: 3 | Status: SHIPPED | OUTPATIENT
Start: 2022-12-29

## 2022-12-29 RX ORDER — DILTIAZEM HCL 90 MG
90 TABLET ORAL 2 TIMES DAILY
Qty: 180 TABLET | Refills: 6 | Status: SHIPPED | OUTPATIENT
Start: 2022-12-29

## 2022-12-29 RX ORDER — METOPROLOL SUCCINATE 25 MG/1
TABLET, EXTENDED RELEASE ORAL
Qty: 540 TABLET | Refills: 6 | Status: SHIPPED | OUTPATIENT
Start: 2022-12-29

## 2022-12-29 RX ORDER — ATORVASTATIN CALCIUM 40 MG/1
40 TABLET, FILM COATED ORAL DAILY
Qty: 90 TABLET | Refills: 3 | Status: SHIPPED | OUTPATIENT
Start: 2022-12-29

## 2022-12-29 NOTE — PROGRESS NOTES
BMI Counseling: Body mass index is 27 51 kg/m²  The BMI is above normal  Nutrition recommendations include reducing portion sizes, decreasing overall calorie intake, 3-5 servings of fruits/vegetables daily, reducing fast food intake, consuming healthier snacks, decreasing soda and/or juice intake, moderation in carbohydrate intake, increasing intake of lean protein, reducing intake of saturated fat and trans fat and reducing intake of cholesterol  Exercise recommendations include moderate aerobic physical activity for 150 minutes/week, exercising 3-5 times per week and strength training exercises

## 2022-12-29 NOTE — PATIENT INSTRUCTIONS
I recommend updating the COVID-vaccine booster as soon as possible  Since the hemoglobin A1c does not correlate very well with your blood sugars I will make an early recheck in 3 months  Also get some additional blood work for me

## 2022-12-29 NOTE — PROGRESS NOTES
Assessment/Plan:    Type 2 diabetes mellitus with diabetic neuropathy ([facility])    Lab Results   Component Value Date    HGBA1C 8 8 (A) 05/02/2022     Current sugar to goal       Diagnoses and all orders for this visit:    Type 2 diabetes mellitus with diabetic neuropathy, without long-term current use of insulin ([facility])  -     POCT hemoglobin A1c    BMI 27 0-27 9,adult    Positive for microalbuminuria    Hyperlipidemia, unspecified hyperlipidemia type    Atrial fibrillation with RVR ([facility])    Vasomotor rhinitis    Essential hypertension    COVID-19          Subjective:      Patient ID: Ivania Serra  is a 81 y o  male  PATIENT RETURNS FOR FOLLOW-UP OF CHRONIC MEDICAL CONDITIONS  ANY HOSPITAL VISITS, EMERGENCY VISITS AND OTHER PROVIDER VISITS SINCE LAST TIME WERE REVIEWED  MEDS WERE REVIEWED AND NO SIDE EFFECTS  NO NEW ISSUES  UNLESS NOTED BELOW  NO NEW MEDICAL PROVIDER REPORTED  THE CHRONIC DISEASES LISTED ABOVE ARE STABLE AND UNCHANGED/ THE PLAN OF CARE FOR THOSE WILL REMAIN UNCHANGED UNLESS NOTED BELOW  Home # OK but A1C higher : describes higher sugars before December       The following portions of the patient's history were reviewed and updated as appropriate: allergies, current medications, past family history, past medical history, past social history, past surgical history and problem list     Review of Systems   Constitutional: Negative for activity change and appetite change  HENT: Negative for trouble swallowing  Eyes: Negative for visual disturbance  Respiratory: Negative for cough and shortness of breath  Cardiovascular: Negative for chest pain, palpitations and leg swelling  Gastrointestinal: Negative for abdominal pain and blood in stool  Endocrine: Negative for polyuria  Genitourinary: Negative for difficulty urinating and hematuria  Skin: Negative for rash  Neurological: Negative for dizziness  Psychiatric/Behavioral: Negative for behavioral problems  Objective:  Vitals:    12/29/22 1446   BP: 142/78   BP Location: Left arm   Patient Position: Sitting   Cuff Size: Large   Pulse: 74   Resp: 18   Temp: 97 6 °F (36 4 °C)   TempSrc: Temporal   SpO2: 98%   Weight: 105 kg (232 lb)   Height: 6' 5" (1 956 m)      Physical Exam  Constitutional:       Appearance: He is well-developed  HENT:      Head: Normocephalic and atraumatic  Eyes:      Conjunctiva/sclera: Conjunctivae normal    Neck:      Thyroid: No thyromegaly  Cardiovascular:      Rate and Rhythm: Normal rate and regular rhythm  Heart sounds: Normal heart sounds  No murmur heard  Pulmonary:      Effort: Pulmonary effort is normal  No respiratory distress  Breath sounds: Normal breath sounds  Musculoskeletal:      Cervical back: Neck supple  Lymphadenopathy:      Cervical: No cervical adenopathy  Skin:     General: Skin is warm and dry  Psychiatric:         Behavior: Behavior normal            Patient's chronic problems that were reviewed today are stable  Recent hospital stays reviewed  Recent labs and imaging reviewed  Recent visits to other providers reviewed  Meds reviewed and no changes made  Appropriate labs and imaging were ordered  Preventive measures appropriate for age and sex were reviewed with patient  Immunizations were updated as appropriate       Some vasomotor rhinitis sx     Re ck A1C  In 3 mos ; may need alteration of program

## 2022-12-30 ENCOUNTER — TELEPHONE (OUTPATIENT)
Dept: ADMINISTRATIVE | Facility: OTHER | Age: 72
End: 2022-12-30

## 2022-12-30 NOTE — TELEPHONE ENCOUNTER
Upon review of the In Basket request we have found as a result of outreach that patient did not have the requested item(s) completed  appt was for  A medical issue  Any additional questions or concerns should be emailed to the Practice Liaisons via the appropriate education email address, please do not reply via In Basket      Thank you  Yadiel Clark MA

## 2022-12-30 NOTE — TELEPHONE ENCOUNTER
----- Message from Yuridia White MA sent at 12/29/2022  2:50 PM EST -----  Regarding: CARE GAP REQUEST  12/29/22 2:50 PM    Hello, our patient Jake Guerrero  has had Diabetic Foot Exam completed/performed  Please assist in updating the patient chart by pulling the document from encounter Tab within Chart Review   The date of service is 11/22/22  Thank you,  Yuridia [facility]

## 2023-01-09 ENCOUNTER — APPOINTMENT (OUTPATIENT)
Dept: LAB | Age: 73
End: 2023-01-09

## 2023-01-09 DIAGNOSIS — R39.12 BENIGN PROSTATIC HYPERPLASIA WITH WEAK URINARY STREAM: ICD-10-CM

## 2023-01-09 DIAGNOSIS — E78.5 HYPERLIPIDEMIA, UNSPECIFIED HYPERLIPIDEMIA TYPE: Chronic | ICD-10-CM

## 2023-01-09 DIAGNOSIS — E11.40 TYPE 2 DIABETES MELLITUS WITH DIABETIC NEUROPATHY, WITHOUT LONG-TERM CURRENT USE OF INSULIN (HCC): Chronic | ICD-10-CM

## 2023-01-09 DIAGNOSIS — I10 ESSENTIAL HYPERTENSION: Chronic | ICD-10-CM

## 2023-01-09 DIAGNOSIS — N40.1 BENIGN PROSTATIC HYPERPLASIA WITH WEAK URINARY STREAM: ICD-10-CM

## 2023-01-09 LAB
ALBUMIN SERPL BCP-MCNC: 3.8 G/DL (ref 3.5–5)
ALP SERPL-CCNC: 86 U/L (ref 46–116)
ALT SERPL W P-5'-P-CCNC: 29 U/L (ref 12–78)
ANION GAP SERPL CALCULATED.3IONS-SCNC: 7 MMOL/L (ref 4–13)
AST SERPL W P-5'-P-CCNC: 16 U/L (ref 5–45)
BASOPHILS # BLD AUTO: 0.07 THOUSANDS/ÂΜL (ref 0–0.1)
BASOPHILS NFR BLD AUTO: 1 % (ref 0–1)
BILIRUB SERPL-MCNC: 0.65 MG/DL (ref 0.2–1)
BUN SERPL-MCNC: 17 MG/DL (ref 5–25)
CALCIUM SERPL-MCNC: 9.1 MG/DL (ref 8.3–10.1)
CHLORIDE SERPL-SCNC: 109 MMOL/L (ref 96–108)
CO2 SERPL-SCNC: 25 MMOL/L (ref 21–32)
CREAT SERPL-MCNC: 1.01 MG/DL (ref 0.6–1.3)
EOSINOPHIL # BLD AUTO: 0.37 THOUSAND/ÂΜL (ref 0–0.61)
EOSINOPHIL NFR BLD AUTO: 5 % (ref 0–6)
ERYTHROCYTE [DISTWIDTH] IN BLOOD BY AUTOMATED COUNT: 13.4 % (ref 11.6–15.1)
GFR SERPL CREATININE-BSD FRML MDRD: 75 ML/MIN/1.73SQ M
GLUCOSE P FAST SERPL-MCNC: 203 MG/DL (ref 65–99)
HCT VFR BLD AUTO: 44.3 % (ref 36.5–49.3)
HGB BLD-MCNC: 14 G/DL (ref 12–17)
IMM GRANULOCYTES # BLD AUTO: 0.01 THOUSAND/UL (ref 0–0.2)
IMM GRANULOCYTES NFR BLD AUTO: 0 % (ref 0–2)
LYMPHOCYTES # BLD AUTO: 1.92 THOUSANDS/ÂΜL (ref 0.6–4.47)
LYMPHOCYTES NFR BLD AUTO: 28 % (ref 14–44)
MCH RBC QN AUTO: 31 PG (ref 26.8–34.3)
MCHC RBC AUTO-ENTMCNC: 31.6 G/DL (ref 31.4–37.4)
MCV RBC AUTO: 98 FL (ref 82–98)
MONOCYTES # BLD AUTO: 0.77 THOUSAND/ÂΜL (ref 0.17–1.22)
MONOCYTES NFR BLD AUTO: 11 % (ref 4–12)
NEUTROPHILS # BLD AUTO: 3.84 THOUSANDS/ÂΜL (ref 1.85–7.62)
NEUTS SEG NFR BLD AUTO: 55 % (ref 43–75)
NRBC BLD AUTO-RTO: 0 /100 WBCS
PLATELET # BLD AUTO: 198 THOUSANDS/UL (ref 149–390)
PMV BLD AUTO: 11.4 FL (ref 8.9–12.7)
POTASSIUM SERPL-SCNC: 4.4 MMOL/L (ref 3.5–5.3)
PROT SERPL-MCNC: 7.5 G/DL (ref 6.4–8.4)
PSA SERPL-MCNC: 2.2 NG/ML (ref 0–4)
RBC # BLD AUTO: 4.52 MILLION/UL (ref 3.88–5.62)
SODIUM SERPL-SCNC: 141 MMOL/L (ref 135–147)
TSH SERPL DL<=0.05 MIU/L-ACNC: 1.84 UIU/ML (ref 0.45–4.5)
WBC # BLD AUTO: 6.98 THOUSAND/UL (ref 4.31–10.16)

## 2023-01-11 ENCOUNTER — TELEPHONE (OUTPATIENT)
Dept: FAMILY MEDICINE CLINIC | Facility: CLINIC | Age: 73
End: 2023-01-11

## 2023-01-12 ENCOUNTER — TREATMENT (OUTPATIENT)
Dept: FAMILY MEDICINE CLINIC | Facility: CLINIC | Age: 73
End: 2023-01-12

## 2023-01-12 ENCOUNTER — TELEPHONE (OUTPATIENT)
Dept: FAMILY MEDICINE CLINIC | Facility: CLINIC | Age: 73
End: 2023-01-12

## 2023-01-12 DIAGNOSIS — E11.40 TYPE 2 DIABETES MELLITUS WITH DIABETIC NEUROPATHY, WITHOUT LONG-TERM CURRENT USE OF INSULIN (HCC): Primary | Chronic | ICD-10-CM

## 2023-02-15 ENCOUNTER — OFFICE VISIT (OUTPATIENT)
Dept: PODIATRY | Facility: CLINIC | Age: 73
End: 2023-02-15

## 2023-02-15 VITALS — HEIGHT: 77 IN | WEIGHT: 230.8 LBS | BODY MASS INDEX: 27.25 KG/M2

## 2023-02-15 DIAGNOSIS — Z89.431 S/P TRANSMETATARSAL AMPUTATION OF FOOT, RIGHT (HCC): ICD-10-CM

## 2023-02-15 DIAGNOSIS — Z47.81 AFTERCARE FOR AMPUTATION STUMP: ICD-10-CM

## 2023-02-15 DIAGNOSIS — E11.40 TYPE 2 DIABETES MELLITUS WITH DIABETIC NEUROPATHY, WITHOUT LONG-TERM CURRENT USE OF INSULIN (HCC): Primary | Chronic | ICD-10-CM

## 2023-02-15 NOTE — PROGRESS NOTES
This patient was seen on 2/15/23  My role is Foot , Ankle, and Wound Specialist    SUBJECTIVE    Chief Complaint:  Foot callous at amputation stump     Patient ID: Bri Albarado  is a 81 y o  male  Reggie Castellano is here s/p complicated foot flap and amputation  He notes no further drainage nor pain at stump  He did get the shoe buttress and notes it is holding his foot in better position  He is wearing the modified insole  The following portions of the patient's history were reviewed and updated as appropriate: allergies, current medications, past family history, past medical history, past social history, past surgical history and problem list     Review of Systems   Constitutional: Positive for activity change  Negative for appetite change, chills, diaphoresis, fatigue, fever and unexpected weight change  Skin: Negative for wound  Neurological: Positive for numbness  OBJECTIVE      Ht 6' 5" (1 956 m)   Wt 105 kg (230 lb 12 8 oz)   BMI 27 37 kg/m²     Foot/Ankle Musculoskeletal Exam    General    Neurological: alert  General additional comments: Transmetatarsal amputation  Large bulky, healed ALT flap intact  No sinus tracts, ulcers, infections  Physical Exam  Vitals and nursing note reviewed  Constitutional:       General: He is not in acute distress  Appearance: Normal appearance  He is not ill-appearing, toxic-appearing or diaphoretic  HENT:      Head: Normocephalic  Pulmonary:      Effort: Pulmonary effort is normal       Breath sounds: Normal breath sounds  Musculoskeletal:      Comments: Transmetatarsal amputation  Large bulky, healed ALT flap intact  No sinus tracts, ulcers, infections  Skin:     Comments: I note a  callous lateral Right TMA stump  No ulcer  Neurological:      Mental Status: He is alert               ASSESSMENT     Diagnoses and all orders for this visit:    Type 2 diabetes mellitus with diabetic neuropathy, without long-term current use of insulin [facility])    Aftercare for amputation stump    S/P transmetatarsal amputation of foot, right ([facility] )         Problem List Items Addressed This Visit        Endocrine    Type 2 diabetes mellitus with diabetic neuropathy ([facility] ) - Primary (Chronic)       Other    Aftercare for amputation stump    S/P transmetatarsal amputation of foot, right ([facility] )           PLAN    No further wound care needed  He's going to continue wearing the prescription shoegear and monitor for new drainage  Follow-up here in 8 weeks

## 2023-03-23 ENCOUNTER — TELEPHONE (OUTPATIENT)
Dept: FAMILY MEDICINE CLINIC | Facility: CLINIC | Age: 73
End: 2023-03-23

## 2023-03-23 NOTE — TELEPHONE ENCOUNTER
Patient concerned about his sugars have been out of control for a few weeks    Requesting sooner appointment before 3/30/23

## 2023-03-30 ENCOUNTER — OFFICE VISIT (OUTPATIENT)
Dept: FAMILY MEDICINE CLINIC | Facility: CLINIC | Age: 73
End: 2023-03-30

## 2023-03-30 VITALS
WEIGHT: 230.8 LBS | SYSTOLIC BLOOD PRESSURE: 130 MMHG | OXYGEN SATURATION: 100 % | DIASTOLIC BLOOD PRESSURE: 80 MMHG | HEART RATE: 70 BPM | BODY MASS INDEX: 27.37 KG/M2 | TEMPERATURE: 95.2 F

## 2023-03-30 DIAGNOSIS — E11.40 TYPE 2 DIABETES MELLITUS WITH DIABETIC NEUROPATHY, WITHOUT LONG-TERM CURRENT USE OF INSULIN (HCC): Primary | Chronic | ICD-10-CM

## 2023-03-30 LAB — SL AMB POCT HEMOGLOBIN AIC: 7.7 (ref ?–6.5)

## 2023-03-30 NOTE — PROGRESS NOTES
Assessment/Plan:    No problem-specific Assessment & Plan notes found for this encounter  There are no diagnoses linked to this encounter  Subjective:      Patient ID: Carmelita Oliveros  is a 72 y o  male  F/U DM : home sugars : average 130-140 ; A1C today : 7 7;       Doing better with his carb counting  The following portions of the patient's history were reviewed and updated as appropriate: allergies, current medications, past family history, past medical history, past social history, past surgical history and problem list     Review of Systems      Objective:  Vitals:    03/30/23 1030   BP: 130/80   BP Location: Left arm   Patient Position: Sitting   Cuff Size: Large   Pulse: 70   Temp: (!) 95 2 °F (35 1 °C)   TempSrc: Temporal   SpO2: 100%   Weight: 105 kg (230 lb 12 8 oz)      Physical Exam  Constitutional:       Appearance: He is well-developed  He is not diaphoretic  HENT:      Head: Normocephalic and atraumatic  Eyes:      Conjunctiva/sclera: Conjunctivae normal    Cardiovascular:      Rate and Rhythm: Normal rate and regular rhythm  Pulmonary:      Effort: Pulmonary effort is normal       Breath sounds: Normal breath sounds  Skin:     General: Skin is warm and dry  Neurological:      Mental Status: He is alert and oriented to person, place, and time           Pts DM has improved and to goal ; no changes

## 2023-04-01 DIAGNOSIS — E11.40 TYPE 2 DIABETES MELLITUS WITH DIABETIC NEUROPATHY, WITHOUT LONG-TERM CURRENT USE OF INSULIN (HCC): Primary | Chronic | ICD-10-CM

## 2023-04-04 RX ORDER — BLOOD SUGAR DIAGNOSTIC
STRIP MISCELLANEOUS
Qty: 100 STRIP | Refills: 22 | Status: SHIPPED | OUTPATIENT
Start: 2023-04-04

## 2023-04-25 ENCOUNTER — OFFICE VISIT (OUTPATIENT)
Dept: PODIATRY | Facility: CLINIC | Age: 73
End: 2023-04-25

## 2023-04-25 VITALS
HEIGHT: 77 IN | DIASTOLIC BLOOD PRESSURE: 93 MMHG | SYSTOLIC BLOOD PRESSURE: 161 MMHG | HEART RATE: 80 BPM | BODY MASS INDEX: 27.16 KG/M2 | WEIGHT: 230 LBS

## 2023-04-25 DIAGNOSIS — E11.40 TYPE 2 DIABETES MELLITUS WITH DIABETIC NEUROPATHY, WITHOUT LONG-TERM CURRENT USE OF INSULIN (HCC): Primary | Chronic | ICD-10-CM

## 2023-04-25 NOTE — PROGRESS NOTES
"Date Patient Seen: 4/25/23       Subjective:     Chief Complaint:  Diabetic foot care     Patient ID: Faustino Collins  is a 72 y o  male  Katelyn Solorio is here s/p complicated foot flap and amputation  He notes no further drainage nor pain at stump  He is wearing the custom insoles and shoes and he relates he's due for a new prescription for shoes  The following portions of the patient's history were reviewed and updated as appropriate: allergies, current medications, past family history, past medical history, past social history, past surgical history and problem list     Review of Systems   Constitutional: Positive for activity change  Negative for appetite change, chills, diaphoresis, fatigue, fever and unexpected weight change  Skin: Negative for wound  Neurological: Positive for numbness  Objective:      /93   Pulse 80   Ht 6' 5\" (1 956 m)   Wt 104 kg (230 lb)   BMI 27 27 kg/m²        Physical Exam  Vitals and nursing note reviewed  Constitutional:       General: He is not in acute distress  Appearance: Normal appearance  He is not ill-appearing, toxic-appearing or diaphoretic  HENT:      Head: Normocephalic  Pulmonary:      Effort: Pulmonary effort is normal       Breath sounds: Normal breath sounds  Musculoskeletal:      Comments: Transmetatarsal amputation  Large bulky, healed ALT flap intact  No sinus tracts, ulcers, infections  Skin:     Comments: I note a  callous lateral Right TMA stump  No ulcer  Neurological:      Mental Status: He is alert              Diagnoses and all orders for this visit:    Type 2 diabetes mellitus with diabetic neuropathy, without long-term current use of insulin ([address] )  -     Diabetic Shoe  -     Diabetic Shoe Inserts         Assessment:  Diabetic neuropathy  TMA  Callous    Plan:  Foot precautions reviewed with patient including the need to wear protective shoegear at all times when walking including in the home, the need to check feet all " surfaces daily with a mirror and report and skin breaks to podiatry, the need to apply an emollient to skin of feet daily  Rx for new custom shoes and insoles given  Callous removed

## 2023-07-05 ENCOUNTER — OFFICE VISIT (OUTPATIENT)
Dept: FAMILY MEDICINE CLINIC | Facility: CLINIC | Age: 73
End: 2023-07-05
Payer: COMMERCIAL

## 2023-07-05 VITALS
BODY MASS INDEX: 27.84 KG/M2 | OXYGEN SATURATION: 98 % | HEART RATE: 100 BPM | WEIGHT: 228.6 LBS | SYSTOLIC BLOOD PRESSURE: 132 MMHG | HEIGHT: 76 IN | TEMPERATURE: 97.7 F | DIASTOLIC BLOOD PRESSURE: 80 MMHG

## 2023-07-05 DIAGNOSIS — Z89.431 S/P TRANSMETATARSAL AMPUTATION OF FOOT, RIGHT (HCC): ICD-10-CM

## 2023-07-05 DIAGNOSIS — R91.1 RIGHT MIDDLE LOBE PULMONARY NODULE: ICD-10-CM

## 2023-07-05 DIAGNOSIS — I10 ESSENTIAL HYPERTENSION: Chronic | ICD-10-CM

## 2023-07-05 DIAGNOSIS — R39.12 BENIGN PROSTATIC HYPERPLASIA WITH WEAK URINARY STREAM: Chronic | ICD-10-CM

## 2023-07-05 DIAGNOSIS — I48.91 ATRIAL FIBRILLATION WITH RVR (HCC): Primary | ICD-10-CM

## 2023-07-05 DIAGNOSIS — R26.9 GAIT ABNORMALITY: ICD-10-CM

## 2023-07-05 DIAGNOSIS — E78.5 HYPERLIPIDEMIA, UNSPECIFIED HYPERLIPIDEMIA TYPE: Chronic | ICD-10-CM

## 2023-07-05 DIAGNOSIS — N40.1 BENIGN PROSTATIC HYPERPLASIA WITH WEAK URINARY STREAM: Chronic | ICD-10-CM

## 2023-07-05 DIAGNOSIS — E11.40 TYPE 2 DIABETES MELLITUS WITH DIABETIC NEUROPATHY, WITHOUT LONG-TERM CURRENT USE OF INSULIN (HCC): ICD-10-CM

## 2023-07-05 DIAGNOSIS — U07.1 COVID-19: ICD-10-CM

## 2023-07-05 LAB
CREAT UR-MCNC: 128 MG/DL
MICROALBUMIN UR-MCNC: 234 MG/L (ref 0–20)
MICROALBUMIN/CREAT 24H UR: 183 MG/G CREATININE (ref 0–30)
SL AMB POCT HEMOGLOBIN AIC: 8.8 (ref ?–6.5)

## 2023-07-05 PROCEDURE — G0439 PPPS, SUBSEQ VISIT: HCPCS | Performed by: FAMILY MEDICINE

## 2023-07-05 PROCEDURE — 1125F AMNT PAIN NOTED PAIN PRSNT: CPT | Performed by: FAMILY MEDICINE

## 2023-07-05 PROCEDURE — 1159F MED LIST DOCD IN RCRD: CPT | Performed by: FAMILY MEDICINE

## 2023-07-05 PROCEDURE — 82043 UR ALBUMIN QUANTITATIVE: CPT | Performed by: FAMILY MEDICINE

## 2023-07-05 PROCEDURE — 1160F RVW MEDS BY RX/DR IN RCRD: CPT | Performed by: FAMILY MEDICINE

## 2023-07-05 PROCEDURE — 3725F SCREEN DEPRESSION PERFORMED: CPT | Performed by: FAMILY MEDICINE

## 2023-07-05 PROCEDURE — 1123F ACP DISCUSS/DSCN MKR DOCD: CPT | Performed by: FAMILY MEDICINE

## 2023-07-05 PROCEDURE — 83036 HEMOGLOBIN GLYCOSYLATED A1C: CPT | Performed by: FAMILY MEDICINE

## 2023-07-05 PROCEDURE — 1170F FXNL STATUS ASSESSED: CPT | Performed by: FAMILY MEDICINE

## 2023-07-05 PROCEDURE — 3060F POS MICROALBUMINURIA REV: CPT | Performed by: FAMILY MEDICINE

## 2023-07-05 PROCEDURE — 3288F FALL RISK ASSESSMENT DOCD: CPT | Performed by: FAMILY MEDICINE

## 2023-07-05 PROCEDURE — 99214 OFFICE O/P EST MOD 30 MIN: CPT | Performed by: FAMILY MEDICINE

## 2023-07-05 PROCEDURE — 82570 ASSAY OF URINE CREATININE: CPT | Performed by: FAMILY MEDICINE

## 2023-07-05 PROCEDURE — G0444 DEPRESSION SCREEN ANNUAL: HCPCS | Performed by: FAMILY MEDICINE

## 2023-07-05 PROCEDURE — 3075F SYST BP GE 130 - 139MM HG: CPT | Performed by: FAMILY MEDICINE

## 2023-07-05 PROCEDURE — 3079F DIAST BP 80-89 MM HG: CPT | Performed by: FAMILY MEDICINE

## 2023-07-05 NOTE — PROGRESS NOTES
Assessment and Plan:     Problem List Items Addressed This Visit        Endocrine    Type 2 diabetes mellitus with diabetic neuropathy ([address]) (Chronic)       Cardiovascular and Mediastinum    Atrial fibrillation with RVR ([address]) - Primary        Preventive health issues were discussed with patient, and age appropriate screening tests were ordered as noted in patient's After Visit Summary. Personalized health advice and appropriate referrals for health education or preventive services given if needed, as noted in patient's After Visit Summary.      History of Present Illness:     Patient presents for a Medicare Wellness Visit    HPI   Patient Care Team:  Sintia Gonzalez MD as PCP - General (Family Medicine)  Koki Richey DPM as PCP - Wound (Podiatry)  James Becker MD (Cardiology)     Review of Systems:     Review of Systems     Problem List:     Patient Active Problem List   Diagnosis   • Hyperlipidemia   • Type 2 diabetes mellitus with diabetic neuropathy ([address])   • Atrial fibrillation with RVR ([address])   • Right middle lobe pulmonary nodule   • Essential hypertension   • Benign prostatic hyperplasia with lower urinary tract symptoms   • Positive for microalbuminuria   • Aftercare for amputation stump   • Ambulatory dysfunction   • S/P transmetatarsal amputation of foot, right ([address])   • Sleep disturbance   • Anxiety   • S/P R ALT flap to right foot   • COVID-19   • Gait abnormality   • Vasomotor rhinitis      Past Medical and Surgical History:     Past Medical History:   Diagnosis Date   • Arthritis    • Atrial fibrillation ([address])     Diagnosed 11/2018   • Benign prostate hyperplasia 04/2002   • Cellulitis     right lower leg    • Colon polyp 2006   • Diabetes mellitus ([address])    • Hearing aid worn     bilat   • Hearing loss     90% loss left ear and 40% right ear   • History of clubfoot     "since birth"   • History of pneumonia    • History of TIA (transient ischemic attack) 04/25/2017 11/30/18 pt denies   • Hyperlipidemia 5/15/2014   • Hypertension    • Infectious viral hepatitis     "cant remember type"   • Irregular heart beat     Afib   • Neuropathy     both feet   • Osteomyelitis (HCC)     right great toe   • Pneumonia    • Right middle lobe pulmonary nodule 11/6/2018   • Seasickness    • Teeth missing    • Type 2 diabetes mellitus with diabetic neuropathy ([address]) 11/6/2018   • Wears glasses     reading   • Wound, open     bottom of right foot     Past Surgical History:   Procedure Laterality Date   • BUNIONECTOMY Right 12/4/2018    Procedure: 5TH METATARSAL BONE PARTIAL RESECTION, FULL THICKNESS DEBRIDEMENT OF DIABETIC ULCER;  Surgeon: Alonzo Carr DPM;  [facility];  Service: Podiatry   • CLUB FOOT RELEASE Bilateral    • COLONOSCOPY     • COMPLEX WOUND CLOSURE TO EXTREMITY  4/27/2021    Procedure: COMPLEX WOUND CLOSURE TO EXTREMITY: RIGHT FOOT;  Surgeon: Anu Lincoln MD;  [facility];  Service: Plastics   • EXTERNAL FIXATOR APPLICATION Right 2/96/7785    Procedure: Application multiplane external fixation;  Surgeon: Iris Spaulding DPM;  [facility];  Service: Podiatry   • FOOT HARDWARE REMOVAL Right 2/17/2021    Procedure: REMOVAL EXTERNAL FIXATOR;  Surgeon: Iris Spaulding DPM;  [facility];  Service: Podiatry   • FOREIGN BODY REMOVAL Right 4/27/2021    Procedure: REMOVAL FOREIGN BODY EXTREMITY: RIGHT FOOT;  Surgeon: Anu iLncoln MD;  [facility];  Service: Plastics   • INCISION AND DRAINAGE OF WOUND Right 2/17/2021    Procedure: INCISION AND DRAINAGE (I&D) EXTREMITY;  Surgeon: Iris Spaulding DPM;  [facility];  Service: Podiatry   • ORIF FOOT FRACTURE Right 3/4/2021    Procedure: VAC PLACEMENT; SCREW FIXATION RIGHT FOOT FUSION;  Surgeon: Iris Spaulding DPM;  [facility];  Service: Podiatry   • OH AMPUTATION FOOT TRANSMETARSAL Right 2/22/2021    Procedure: AMPUTATION TRANSMETATARSAL (TMA), REMOVAL NAIL IM T2 ICF HARDWARE; Surgeon: Lizty Snow DPM;  [facility];  Service: Podiatry   • CA ARTHRD MIDTARSL/TARS MLT/TRANSVRS W/OSTEOT Right 2/12/2021    Procedure: foot ARTHRODESIS/FUSION;  Surgeon: Litzy Snow DPM;  [facility];  Service: Podiatry   • CA DEBRIDEMENT BONE MUSCLE &/FASCIA 20 SQ CM/< Right 12/2/2021    Procedure: DEBRIDEMENT OF TARSAL BONES WITH Jaqueline Manger;  Surgeon: Litzy Snow DPM;  [facility];  Service: Podiatry   • CA LNGTH/SHRT TENDON LEG/ANKLE 1 TENDON SPX Right 2/12/2021    Procedure: LENGTHEN TIBIAL TENDON, TRANS HEEL CORD;  Surgeon: Litzy Snow DPM;  [facility];  Service: Podiatry   • CA MUSC MYOCUTANEOUS/FASCIOCUTANEOUS FLAP TRUNK Right 4/12/2021    Procedure: RECONSTRUCTION MICROSURGICAL W/ FREE FLAP;  Surgeon: Jama Alonso MD;  [facility];  Service: Plastics   • CA MUSC MYOCUTANEOUS/FASCIOCUTANEOUS FLAP TRUNK Right 4/12/2021    Procedure: TAKEBACK RECONSTRUCTION MICROSURGICAL W/ FREE FLAP;  Surgeon: Jama Alonso MD;  [facility];  Service: Plastics   • CA MUSC MYOCUTANEOUS/FASCIOCUTANEOUS FLAP TRUNK Right 4/13/2021    Procedure: RECONSTRUCTION MICROSURGICAL W/ FREE FLAP, RE EXPLORATION, MICRO VASCULAR REVISION;  Surgeon: Jama Alonso MD;  [facility];  Service: Plastics   • CA OSTC PRTL EXOSTC/CONDYLC METAR HEAD Right 12/29/2020    Procedure: EXCISION EXOSTOSIS;  Surgeon: Litzy Snow DPM;  [facility];  Service: Podiatry   • CA SECONDARY CLOSURE SURG WOUND/DEHSN EXTSV/COMPLIC Right 29/79/1911    Procedure: PRIMARY DELAYED CLOSURE;  Surgeon: Litzy Snow DPM;  [facility];  Service: Podiatry   • TOE AMPUTATION Right     partial great toe   • TONSILLECTOMY     • VAC DRESSING APPLICATION Right 0/0/8387    Procedure: APPLICATION VAC DRESSING EXTREMITY;  Surgeon: Jama Alonso MD;  [facility];  Service: Plastics   • VASECTOMY  1992   • WOUND DEBRIDEMENT Right 3/1/2021    Procedure: DEBRIDEMENT LOWER EXTREMITY [facility] OUT); Surgeon: Musa Dickerson MD;  [facility];  Service: Plastics   • WOUND DEBRIDEMENT Right 2021    Procedure: DEBRIDEMENT RIGHT FOOT;  Surgeon: Musa Dickerson MD;  [facility];  Service: Plastics   • WOUND DEBRIDEMENT Right 2021    Procedure: DEBRIDEMENT LOWER EXTREMITY [facility] OUT);   Surgeon: Musa Dickerson MD;  [facility];  Service: Plastics   • WOUND DEBRIDEMENT Right 2021    Procedure: DEBRIDEMENT LOWER EXTREMITY [facility] OUT): RIGHT FOOT;  Surgeon: Musa Dickerson MD;  [facility];  Service: Plastics      Family History:     Family History   Problem Relation Age of Onset   • Diabetes Father         mellitus      Social History:     Social History     Socioeconomic History   • Marital status: /Civil Union     Spouse name: None   • Number of children: None   • Years of education: None   • Highest education level: None   Occupational History   • None   Tobacco Use   • Smoking status: Former     Types: Cigarettes     Quit date: 1988     Years since quittin.6   • Smokeless tobacco: Former   • Tobacco comments:     exposure to passive smoke   Vaping Use   • Vaping Use: Never used   Substance and Sexual Activity   • Alcohol use: Yes     Comment: occasionally   • Drug use: Not Currently   • Sexual activity: Yes     Partners: Female   Other Topics Concern   • None   Social History Narrative   • None     Social Determinants of Health     Financial Resource Strain: Not on file   Food Insecurity: Not on file   Transportation Needs: Not on file   Physical Activity: Not on file   Stress: Not on file   Social Connections: Not on file   Intimate Partner Violence: Not on file   Housing Stability: Not on file      Medications and Allergies:     Current Outpatient Medications   Medication Sig Dispense Refill   • Accu-Chek Guide test strip TEST BLOOD SUGARS once a day 100 strip 22   • acetaminophen (Mapap Arthritis Pain) 650 mg CR tablet Take 1 tablet (650 mg total) by mouth 2 (two) times a day 60 tablet 1   • atorvastatin (LIPITOR) 40 mg tablet Take 1 tablet (40 mg total) by mouth daily 90 tablet 3   • Clenpiq 10-3.5-12 MG-GM -GM/160ML SOLN      • diltiazem (CARDIZEM) 90 mg tablet Take 1 tablet (90 mg total) by mouth 2 (two) times a day 180 tablet 6   • Eliquis 5 MG Take 1 tablet (5 mg total) by mouth 2 (two) times a day 60 tablet 11   • glimepiride (AMARYL) 2 mg tablet 3 daily as directed. 135 tablet 6   • glucose blood (OneTouch Ultra) test strip Use as instructed 100 strip 3   • glucose blood (OneTouch Verio) test strip Use 1 each daily Use as instructed 100 strip 3   • Januvia 100 MG tablet Take 1 tablet (100 mg total) by mouth daily 132 tablet 2   • Lancets (onetouch ultrasoft) lancets Use daily 100 each 3   • Lancets (onetouch ultrasoft) lancets Use as instructed 100 each 3   • lisinopril (ZESTRIL) 40 mg tablet Take 1 tablet (40 mg total) by mouth daily 90 tablet 3   • metFORMIN (GLUCOPHAGE) 500 mg tablet Take 1 tablet (500 mg total) by mouth 2 (two) times a day with meals 180 tablet 5   • metoprolol succinate (TOPROL-XL) 25 mg 24 hr tablet TAKE (3) TABLETS DAILY BY MOUTH BY MOUTH TWICE A  tablet 6   • tamsulosin (FLOMAX) 0.4 mg Take 1 capsule (0.4 mg total) by mouth daily with dinner 90 capsule 3     No current facility-administered medications for this visit. Allergies   Allergen Reactions   • Simvastatin Myalgia   • Itraconazole Other (See Comments)     Pt denies knowledge of allergy. .       Immunizations:     Immunization History   Administered Date(s) Administered   • COVID-19 PFIZER VACCINE 0.3 ML IM 05/06/2021, 05/27/2021, 12/21/2021, 07/20/2022   • COVID-19 Pfizer Vac BIVALENT Stevie-sucrose 12 Yr+ IM (BOOSTER ONLY) 12/30/2022   • INFLUENZA 12/04/2014, 09/30/2016, 09/30/2016, 11/19/2018   • Influenza Split 01/14/2011, 12/20/2011, 11/26/2013   • Influenza, high dose seasonal 0.7 mL 09/05/2018, 09/03/2019, 12/23/2020, 01/17/2022, 12/29/2022   • Influenza, seasonal, injectable 09/11/2009, 12/04/2014   • Pneumococcal Conjugate 13-Valent 10/24/2017, 11/17/2017   • Pneumococcal Polysaccharide PPV23 03/06/2007, 01/04/2019   • Zoster 09/18/2015      Health Maintenance:         Topic Date Due   • Colorectal Cancer Screening  08/31/2025   • Hepatitis C Screening  Completed         Topic Date Due   • COVID-19 Vaccine (6 - Pfizer series) 04/30/2023   • Influenza Vaccine (1) 09/01/2023      Medicare Screening Tests and Risk Assessments:     Angelica Dodge is here for his Subsequent Wellness visit. Last Medicare Wellness visit information reviewed, patient interviewed, no change since last AWV. Depression Screening:   PHQ-2 Score: 1      Previous Hospitalizations:   Any hospitalizations or ED visits within the last 12 months?: No      Hospitalization Comments: Eyes/ ok    Dental: counseled     Advance Care Planning:   Living will: Yes    Durable POA for healthcare:  Yes    Advanced directive: Yes      Cognitive Screening:   Provider or family/friend/caregiver concerned regarding cognition?: No    PREVENTIVE SCREENINGS      Cardiovascular Screening:    General: Screening Not Indicated and History Lipid Disorder      Diabetes Screening:     General: Screening Not Indicated, History Diabetes and Screening Current      Colorectal Cancer Screening:     General: Screening Current      Prostate Cancer Screening:    General: Screening Current      Osteoporosis Screening:    General: Screening Not Indicated      Abdominal Aortic Aneurysm (AAA) Screening:    Risk factors include: age between 70-77 yo and tobacco use        Lung Cancer Screening:     General: Screening Not Indicated      Hepatitis C Screening:    General: Screening Current    Screening, Brief Intervention, and Referral to Treatment (SBIRT)      Brief Intervention  Alcohol & drug use screenings were reviewed. No concerns regarding substance use disorder identified. No results found.      Physical Exam:     /80 (BP Location: Left arm, Patient Position: Sitting, Cuff Size: Large)   Pulse 100   Temp 97.7 °F (36.5 °C) (Temporal)   Ht 6' 4" (1.93 m)   Wt 104 kg (228 lb 9.6 oz)   SpO2 98%   BMI 27.83 kg/m²     Physical Exam     Steve Borja MD

## 2023-07-05 NOTE — PROGRESS NOTES
Assessment/Plan:    No problem-specific Assessment & Plan notes found for this encounter. Diagnoses and all orders for this visit:    Atrial fibrillation with RVR ([address])    Type 2 diabetes mellitus with diabetic neuropathy, without long-term current use of insulin ([facility])          Subjective:      Patient ID: Zoie Smith is a 72 y.o. male. PATIENT RETURNS FOR FOLLOW-UP OF CHRONIC MEDICAL CONDITIONS. ANY HOSPITAL VISITS, EMERGENCY VISITS AND OTHER PROVIDER VISITS SINCE LAST TIME WERE REVIEWED. MEDS WERE REVIEWED AND NO SIDE EFFECTS. NO NEW ISSUES  UNLESS NOTED BELOW. NO NEW MEDICAL PROVIDER REPORTED. THE CHRONIC DISEASES LISTED ABOVE ARE STABLE AND UNCHANGED/ THE PLAN OF CARE FOR THOSE WILL REMAIN UNCHANGED UNLESS NOTED BELOW. AWV/sub    Sugars avg : 180-200. The following portions of the patient's history were reviewed and updated as appropriate: allergies, current medications, past family history, past medical history, past social history, past surgical history and problem list.    Review of Systems   Constitutional: Negative for activity change and appetite change. HENT: Negative for trouble swallowing. Eyes: Negative for visual disturbance. Respiratory: Negative for cough and shortness of breath. Cardiovascular: Negative for chest pain, palpitations and leg swelling. Gastrointestinal: Negative for abdominal pain and blood in stool. Endocrine: Negative for polyuria. Genitourinary: Negative for difficulty urinating and hematuria. Skin: Negative for rash. Neurological: Negative for dizziness. Psychiatric/Behavioral: Negative for behavioral problems.          Objective:  Vitals:    07/05/23 1129   BP: 132/80   BP Location: Left arm   Patient Position: Sitting   Cuff Size: Large   Pulse: 100   Temp: 97.7 °F (36.5 °C)   TempSrc: Temporal   SpO2: 98%   Weight: 104 kg (228 lb 9.6 oz)   Height: 6' 4" (1.93 m)      Physical Exam  Constitutional:       Appearance: He is well-developed. HENT:      Head: Normocephalic and atraumatic. Eyes:      Conjunctiva/sclera: Conjunctivae normal.   Neck:      Thyroid: No thyromegaly. Cardiovascular:      Rate and Rhythm: Normal rate and regular rhythm. Heart sounds: Normal heart sounds. No murmur heard. Pulmonary:      Effort: Pulmonary effort is normal. No respiratory distress. Breath sounds: Normal breath sounds. Musculoskeletal:      Cervical back: Neck supple. Lymphadenopathy:      Cervical: No cervical adenopathy. Skin:     General: Skin is warm and dry.    Psychiatric:         Behavior: Behavior normal.

## 2023-07-05 NOTE — PATIENT INSTRUCTIONS
I do recommend updating the COVID-vaccine as soon as you can. Your drugstore should have this. Medicare Preventive Visit Patient Instructions  Thank you for completing your Welcome to Medicare Visit or Medicare Annual Wellness Visit today. Your next wellness visit will be due in one year (7/5/2024). The screening/preventive services that you may require over the next 5-10 years are detailed below. Some tests may not apply to you based off risk factors and/or age. Screening tests ordered at today's visit but not completed yet may show as past due. Also, please note that scanned in results may not display below. Preventive Screenings:  Service Recommendations Previous Testing/Comments   Colorectal Cancer Screening  · Colonoscopy    · Fecal Occult Blood Test (FOBT)/Fecal Immunochemical Test (FIT)  · Fecal DNA/Cologuard Test  · Flexible Sigmoidoscopy Age: 43-73 years old   Colonoscopy: every 10 years (May be performed more frequently if at higher risk)  OR  FOBT/FIT: every 1 year  OR  Cologuard: every 3 years  OR  Sigmoidoscopy: every 5 years  Screening may be recommended earlier than age 39 if at higher risk for colorectal cancer. Also, an individualized decision between you and your healthcare provider will decide whether screening between the ages of 77-80 would be appropriate.  Colonoscopy: 09/01/2022  FOBT/FIT: Not on file  Cologuard: Not on file  Sigmoidoscopy: Not on file    Screening Current  Screening Current     Prostate Cancer Screening Individualized decision between patient and health care provider in men between ages of 53-66   Medicare will cover every 12 months beginning on the day after your 50th birthday PSA: 2.2 ng/mL     Screening Current  Screening Current     Hepatitis C Screening Once for adults born between [address] [place] [address]  More frequently in patients at high risk for Hepatitis C Hep C Antibody: 02/20/2021    Screening Current  Screening Current   Diabetes Screening 1-2 times per year if you're at risk for diabetes or have pre-diabetes Fasting glucose: 203 mg/dL (1/9/2023)  A1C: 7.7 (3/30/2023)  Screening Not Indicated  History Diabetes  Screening Not Indicated  History Diabetes  Screening Current   Cholesterol Screening Once every 5 years if you don't have a lipid disorder. May order more often based on risk factors. Lipid panel: 11/08/2018  Screening Not Indicated  History Lipid Disorder  Screening Not Indicated  History Lipid Disorder      Other Preventive Screenings Covered by Medicare:  1. Abdominal Aortic Aneurysm (AAA) Screening: covered once if your at risk. You're considered to be at risk if you have a family history of AAA or a male between the age of 70-76 who smoking at least 100 cigarettes in your lifetime. 2. Lung Cancer Screening: covers low dose CT scan once per year if you meet all of the following conditions: (1) Age 48-67; (2) No signs or symptoms of lung cancer; (3) Current smoker or have quit smoking within the last 15 years; (4) You have a tobacco smoking history of at least 20 pack years (packs per day x number of years you smoked); (5) You get a written order from a healthcare provider. 3. Glaucoma Screening: covered annually if you're considered high risk: (1) You have diabetes OR (2) Family history of glaucoma OR (3)  aged 48 and older OR (3)  American aged 72 and older  3. Osteoporosis Screening: covered every 2 years if you meet one of the following conditions: (1) Have a vertebral abnormality; (2) On glucocorticoid therapy for more than 3 months; (3) Have primary hyperparathyroidism; (4) On osteoporosis medications and need to assess response to drug therapy. 5. HIV Screening: covered annually if you're between the age of 14-79. Also covered annually if you are younger than 13 and older than 72 with risk factors for HIV infection. For pregnant patients, it is covered up to 3 times per pregnancy.     Immunizations:  Immunization Recommendations Influenza Vaccine Annual influenza vaccination during flu season is recommended for all persons aged >= 6 months who do not have contraindications   Pneumococcal Vaccine   * Pneumococcal conjugate vaccine = PCV13 (Prevnar 13), PCV15 (Vaxneuvance), PCV20 (Prevnar 20)  * Pneumococcal polysaccharide vaccine = PPSV23 (Pneumovax) Adults 20-63 years old: 1-3 doses may be recommended based on certain risk factors  Adults 72 years old: 1-2 doses may be recommended based off what pneumonia vaccine you previously received   Hepatitis B Vaccine 3 dose series if at intermediate or high risk (ex: diabetes, end stage renal disease, liver disease)   Tetanus (Td) Vaccine - COST NOT COVERED BY MEDICARE PART B Following completion of primary series, a booster dose should be given every 10 years to maintain immunity against tetanus. Td may also be given as tetanus wound prophylaxis. Tdap Vaccine - COST NOT COVERED BY MEDICARE PART B Recommended at least once for all adults. For pregnant patients, recommended with each pregnancy. Shingles Vaccine (Shingrix) - COST NOT COVERED BY MEDICARE PART B  2 shot series recommended in those aged 48 and above     Health Maintenance Due:      Topic Date Due   • Colorectal Cancer Screening  08/31/2025   • Hepatitis C Screening  Completed     Immunizations Due:      Topic Date Due   • COVID-19 Vaccine (6 - Pfizer series) 04/30/2023   • Influenza Vaccine (1) 09/01/2023     Advance Directives   What are advance directives? Advance directives are legal documents that state your wishes and plans for medical care. These plans are made ahead of time in case you lose your ability to make decisions for yourself. Advance directives can apply to any medical decision, such as the treatments you want, and if you want to donate organs. What are the types of advance directives? There are many types of advance directives, and each state has rules about how to use them.  You may choose a combination of any of the following:  · Living will: This is a written record of the treatment you want. You can also choose which treatments you do not want, which to limit, and which to stop at a certain time. This includes surgery, medicine, IV fluid, and tube feedings. · Durable power of  for healthcare [facility]): This is a written record that states who you want to make healthcare choices for you when you are unable to make them for yourself. This person, called a proxy, is usually a family member or a friend. You may choose more than 1 proxy. · Do not resuscitate (DNR) order:  A DNR order is used in case your heart stops beating or you stop breathing. It is a request not to have certain forms of treatment, such as CPR. A DNR order may be included in other types of advance directives. · Medical directive: This covers the care that you want if you are in a coma, near death, or unable to make decisions for yourself. You can list the treatments you want for each condition. Treatment may include pain medicine, surgery, blood transfusions, dialysis, IV or tube feedings, and a ventilator (breathing machine). · Values history: This document has questions about your views, beliefs, and how you feel and think about life. This information can help others choose the care that you would choose. Why are advance directives important? An advance directive helps you control your care. Although spoken wishes may be used, it is better to have your wishes written down. Spoken wishes can be misunderstood, or not followed. Treatments may be given even if you do not want them. An advance directive may make it easier for your family to make difficult choices about your care. Weight Management   Why it is important to manage your weight:  Being overweight increases your risk of health conditions such as heart disease, high blood pressure, type 2 diabetes, and certain types of cancer.  It can also increase your risk for osteoarthritis, sleep apnea, and other respiratory problems. Aim for a slow, steady weight loss. Even a small amount of weight loss can lower your risk of health problems. How to lose weight safely:  A safe and healthy way to lose weight is to eat fewer calories and get regular exercise. You can lose up about 1 pound a week by decreasing the number of calories you eat by 500 calories each day. Healthy meal plan for weight management:  A healthy meal plan includes a variety of foods, contains fewer calories, and helps you stay healthy. A healthy meal plan includes the following:  · Eat whole-grain foods more often. A healthy meal plan should contain fiber. Fiber is the part of grains, fruits, and vegetables that is not broken down by your body. Whole-grain foods are healthy and provide extra fiber in your diet. Some examples of whole-grain foods are whole-wheat breads and pastas, oatmeal, brown rice, and bulgur. · Eat a variety of vegetables every day. Include dark, leafy greens such as spinach, kale, lisa greens, and mustard greens. Eat yellow and orange vegetables such as carrots, sweet potatoes, and winter squash. · Eat a variety of fruits every day. Choose fresh or canned fruit (canned in its own juice or light syrup) instead of juice. Fruit juice has very little or no fiber. · Eat low-fat dairy foods. Drink fat-free (skim) milk or 1% milk. Eat fat-free yogurt and low-fat cottage cheese. Try low-fat cheeses such as mozzarella and other reduced-fat cheeses. · Choose meat and other protein foods that are low in fat. Choose beans or other legumes such as split peas or lentils. Choose fish, skinless poultry (chicken or turkey), or lean cuts of red meat (beef or pork). Before you cook meat or poultry, cut off any visible fat. · Use less fat and oil. Try baking foods instead of frying them. Add less fat, such as margarine, sour cream, regular salad dressing and mayonnaise to foods. Eat fewer high-fat foods.  Some examples of high-fat foods include french fries, doughnuts, ice cream, and cakes. · Eat fewer sweets. Limit foods and drinks that are high in sugar. This includes candy, cookies, regular soda, and sweetened drinks. Exercise:  Exercise at least 30 minutes per day on most days of the week. Some examples of exercise include walking, biking, dancing, and swimming. You can also fit in more physical activity by taking the stairs instead of the elevator or parking farther away from stores. Ask your healthcare provider about the best exercise plan for you. © Copyright [employer] 2018 Information is for End User's use only and may not be sold, redistributed or otherwise used for commercial purposes.  All illustrations and images included in CareNotes® are the copyrighted property of [employer] Inc. or [address] [place] [address]

## 2023-07-05 NOTE — PROGRESS NOTES
Assessment and Plan:     Problem List Items Addressed This Visit        Endocrine    Type 2 diabetes mellitus with diabetic neuropathy ([address]) (Chronic)        Preventive health issues were discussed with patient, and age appropriate screening tests were ordered as noted in patient's After Visit Summary. Personalized health advice and appropriate referrals for health education or preventive services given if needed, as noted in patient's After Visit Summary.      History of Present Illness:     Patient presents for a Medicare Wellness Visit    HPI   Patient Care Team:  Cheyenne Abrams MD as PCP - General (Family Medicine)  Iris Spaulding DPM as PCP - Wound (Podiatry)  Rajesh Flores MD (Cardiology)     Review of Systems:     Review of Systems     Problem List:     Patient Active Problem List   Diagnosis   • Hyperlipidemia   • Type 2 diabetes mellitus with diabetic neuropathy ([address])   • Atrial fibrillation with RVR ([address])   • Right middle lobe pulmonary nodule   • Essential hypertension   • Benign prostatic hyperplasia with lower urinary tract symptoms   • Positive for microalbuminuria   • Aftercare for amputation stump   • Ambulatory dysfunction   • S/P transmetatarsal amputation of foot, right ([address])   • Sleep disturbance   • Anxiety   • S/P R ALT flap to right foot   • COVID-19   • Gait abnormality   • Vasomotor rhinitis      Past Medical and Surgical History:     Past Medical History:   Diagnosis Date   • Arthritis    • Atrial fibrillation ([address])     Diagnosed 11/2018   • Benign prostate hyperplasia 04/2002   • Cellulitis     right lower leg    • Colon polyp 2006   • Diabetes mellitus ([address])    • Hearing aid worn     bilat   • Hearing loss     90% loss left ear and 40% right ear   • History of clubfoot     "since birth"   • History of pneumonia    • History of TIA (transient ischemic attack) 04/25/2017 11/30/18 pt denies   • Hyperlipidemia 5/15/2014   • Hypertension    • Infectious viral hepatitis     "cant remember type"   • Irregular heart beat     Afib   • Neuropathy     both feet   • Osteomyelitis (HCC)     right great toe   • Pneumonia    • Right middle lobe pulmonary nodule 11/6/2018   • Seasickness    • Teeth missing    • Type 2 diabetes mellitus with diabetic neuropathy ([address]) 11/6/2018   • Wears glasses     reading   • Wound, open     bottom of right foot     Past Surgical History:   Procedure Laterality Date   • BUNIONECTOMY Right 12/4/2018    Procedure: 5TH METATARSAL BONE PARTIAL RESECTION, FULL THICKNESS DEBRIDEMENT OF DIABETIC ULCER;  Surgeon: Gerald Farley DPM;  [facility];  Service: Podiatry   • CLUB FOOT RELEASE Bilateral    • COLONOSCOPY     • COMPLEX WOUND CLOSURE TO EXTREMITY  4/27/2021    Procedure: COMPLEX WOUND CLOSURE TO EXTREMITY: RIGHT FOOT;  Surgeon: Faizan Santiago MD;  [facility];  Service: Plastics   • EXTERNAL FIXATOR APPLICATION Right 7/03/0096    Procedure: Application multiplane external fixation;  Surgeon: Christie Arias DPM;  [facility];  Service: Podiatry   • FOOT HARDWARE REMOVAL Right 2/17/2021    Procedure: REMOVAL EXTERNAL FIXATOR;  Surgeon: Christie Arias DPM;  [facility];  Service: Podiatry   • FOREIGN BODY REMOVAL Right 4/27/2021    Procedure: REMOVAL FOREIGN BODY EXTREMITY: RIGHT FOOT;  Surgeon: Faizan Santiago MD;  [facility];  Service: Plastics   • INCISION AND DRAINAGE OF WOUND Right 2/17/2021    Procedure: INCISION AND DRAINAGE (I&D) EXTREMITY;  Surgeon: Christie Arias DPM;  [facility];  Service: Podiatry   • ORIF FOOT FRACTURE Right 3/4/2021    Procedure: VAC PLACEMENT; SCREW FIXATION RIGHT FOOT FUSION;  Surgeon: Christie Arias DPM;  [facility];  Service: Podiatry   • WI AMPUTATION FOOT TRANSMETARSAL Right 2/22/2021    Procedure: AMPUTATION TRANSMETATARSAL (TMA), REMOVAL NAIL IM T2 ICF HARDWARE;  Surgeon: Christie Arias DPM;  [facility];  Service: Podiatry   • WI ARTHRD MIDTARSL/TARS MLT/TRANSVRS W/OSTEOT Right 2/12/2021    Procedure: foot ARTHRODESIS/FUSION;  Surgeon: Gabe Cabrera DPM;  [facility];  Service: Podiatry   • NY DEBRIDEMENT BONE MUSCLE &/FASCIA 20 SQ CM/< Right 12/2/2021    Procedure: DEBRIDEMENT OF TARSAL BONES WITH Cammie Look;  Surgeon: Gabe Cabrera DPM;  [facility];  Service: Podiatry   • NY LNGTH/SHRT TENDON LEG/ANKLE 1 TENDON SPX Right 2/12/2021    Procedure: LENGTHEN TIBIAL TENDON, TRANS HEEL CORD;  Surgeon: Gabe Cabrera DPM;  [facility];  Service: Podiatry   • NY MUSC MYOCUTANEOUS/FASCIOCUTANEOUS FLAP TRUNK Right 4/12/2021    Procedure: RECONSTRUCTION MICROSURGICAL W/ FREE FLAP;  Surgeon: Louie Horn MD;  [facility];  Service: Plastics   • NY MUSC MYOCUTANEOUS/FASCIOCUTANEOUS FLAP TRUNK Right 4/12/2021    Procedure: TAKEBACK RECONSTRUCTION MICROSURGICAL W/ FREE FLAP;  Surgeon: Louie Horn MD;  [facility];  Service: Plastics   • NY MUSC MYOCUTANEOUS/FASCIOCUTANEOUS FLAP TRUNK Right 4/13/2021    Procedure: RECONSTRUCTION MICROSURGICAL W/ FREE FLAP, RE EXPLORATION, MICRO VASCULAR REVISION;  Surgeon: Louie Horn MD;  [facility];  Service: Plastics   • NY OSTC PRTL EXOSTC/CONDYLC METAR HEAD Right 12/29/2020    Procedure: EXCISION EXOSTOSIS;  Surgeon: Gabe Cabrera DPM;  [facility];  Service: Podiatry   • NY SECONDARY CLOSURE SURG WOUND/DEHSN EXTSV/COMPLIC Right 10/42/8194    Procedure: PRIMARY DELAYED CLOSURE;  Surgeon: Gabe Cabrera DPM;  [facility];  Service: Podiatry   • TOE AMPUTATION Right     partial great toe   • TONSILLECTOMY     • VAC DRESSING APPLICATION Right 8/4/5875    Procedure: APPLICATION VAC DRESSING EXTREMITY;  Surgeon: Louie Horn MD;  [facility];  Service: Plastics   • VASECTOMY  1992   • WOUND DEBRIDEMENT Right 3/1/2021    Procedure: DEBRIDEMENT LOWER EXTREMITY [facility]); Surgeon: Precious Riley MD;  [facility];  Service: Plastics   • WOUND DEBRIDEMENT Right 2021    Procedure: DEBRIDEMENT RIGHT FOOT;  Surgeon: Precious Riley MD;  [facility];  Service: Plastics   • WOUND DEBRIDEMENT Right 2021    Procedure: DEBRIDEMENT LOWER EXTREMITY [facility] OUT);   Surgeon: Precious Riley MD;  [facility];  Service: Plastics   • WOUND DEBRIDEMENT Right 2021    Procedure: DEBRIDEMENT LOWER EXTREMITY [facility] OUT): RIGHT FOOT;  Surgeon: Precious Riley MD;  [facility];  Service: Plastics      Family History:     Family History   Problem Relation Age of Onset   • Diabetes Father         mellitus      Social History:     Social History     Socioeconomic History   • Marital status: /Civil Union     Spouse name: None   • Number of children: None   • Years of education: None   • Highest education level: None   Occupational History   • None   Tobacco Use   • Smoking status: Former     Types: Cigarettes     Quit date: 1988     Years since quittin.6   • Smokeless tobacco: Former   • Tobacco comments:     exposure to passive smoke   Vaping Use   • Vaping Use: Never used   Substance and Sexual Activity   • Alcohol use: Yes     Comment: occasionally   • Drug use: Not Currently   • Sexual activity: Yes     Partners: Female   Other Topics Concern   • None   Social History Narrative   • None     Social Determinants of Health     Financial Resource Strain: Not on file   Food Insecurity: Not on file   Transportation Needs: Not on file   Physical Activity: Not on file   Stress: Not on file   Social Connections: Not on file   Intimate Partner Violence: Not on file   Housing Stability: Not on file      Medications and Allergies:     Current Outpatient Medications   Medication Sig Dispense Refill   • Accu-Chek Guide test strip TEST BLOOD SUGARS once a day 100 strip 22   • acetaminophen (Mapap Arthritis Pain) 650 mg CR tablet Take 1 tablet (650 mg total) by mouth 2 (two) times a day 60 tablet 1   • atorvastatin (LIPITOR) 40 mg tablet Take 1 tablet (40 mg total) by mouth daily 90 tablet 3   • Clenpiq 10-3.5-12 MG-GM -GM/160ML SOLN      • diltiazem (CARDIZEM) 90 mg tablet Take 1 tablet (90 mg total) by mouth 2 (two) times a day 180 tablet 6   • Eliquis 5 MG Take 1 tablet (5 mg total) by mouth 2 (two) times a day 60 tablet 11   • glimepiride (AMARYL) 2 mg tablet 3 daily as directed. 135 tablet 6   • glucose blood (OneTouch Ultra) test strip Use as instructed 100 strip 3   • glucose blood (OneTouch Verio) test strip Use 1 each daily Use as instructed 100 strip 3   • Januvia 100 MG tablet Take 1 tablet (100 mg total) by mouth daily 132 tablet 2   • Lancets (onetouch ultrasoft) lancets Use daily 100 each 3   • Lancets (onetouch ultrasoft) lancets Use as instructed 100 each 3   • lisinopril (ZESTRIL) 40 mg tablet Take 1 tablet (40 mg total) by mouth daily 90 tablet 3   • metFORMIN (GLUCOPHAGE) 500 mg tablet Take 1 tablet (500 mg total) by mouth 2 (two) times a day with meals 180 tablet 5   • metoprolol succinate (TOPROL-XL) 25 mg 24 hr tablet TAKE (3) TABLETS DAILY BY MOUTH BY MOUTH TWICE A  tablet 6   • tamsulosin (FLOMAX) 0.4 mg Take 1 capsule (0.4 mg total) by mouth daily with dinner 90 capsule 3     No current facility-administered medications for this visit. Allergies   Allergen Reactions   • Simvastatin Myalgia   • Itraconazole Other (See Comments)     Pt denies knowledge of allergy. .       Immunizations:     Immunization History   Administered Date(s) Administered   • COVID-19 PFIZER VACCINE 0.3 ML IM 05/06/2021, 05/27/2021, 12/21/2021, 07/20/2022   • COVID-19 Pfizer Vac BIVALENT Stevie-sucrose 12 Yr+ IM (BOOSTER ONLY) 12/30/2022   • INFLUENZA 12/04/2014, 09/30/2016, 09/30/2016, 11/19/2018   • Influenza Split 01/14/2011, 12/20/2011, 11/26/2013   • Influenza, high dose seasonal 0.7 mL 09/05/2018, 09/03/2019, 12/23/2020, 01/17/2022, 12/29/2022   • Influenza, seasonal, injectable 09/11/2009, 12/04/2014   • Pneumococcal Conjugate 13-Valent 10/24/2017, 11/17/2017   • Pneumococcal Polysaccharide PPV23 03/06/2007, 01/04/2019   • Zoster 09/18/2015      Health Maintenance:         Topic Date Due   • Colorectal Cancer Screening  08/31/2025   • Hepatitis C Screening  Completed         Topic Date Due   • COVID-19 Vaccine (6 - Pfizer series) 04/30/2023   • Influenza Vaccine (1) 09/01/2023      Medicare Screening Tests and Risk Assessments:     Roz Roldan is here for his Subsequent Wellness visit. Health Risk Assessment:   Patient rates overall health as good. Patient feels that their physical health rating is slightly better. Patient is satisfied with their life. Eyesight was rated as same. Hearing was rated as much worse. Patient feels that their emotional and mental health rating is same. Patients states they are sometimes angry. Patient states they are never, rarely unusually tired/fatigued. Pain experienced in the last 7 days has been none. Patient states that he has experienced no weight loss or gain in last 6 months. Fall Risk Screening: In the past year, patient has experienced: no history of falling in past year      Home Safety:  Patient has trouble with stairs inside or outside of their home. Patient has working smoke alarms and has working carbon monoxide detector. Home safety hazards include: none. Nutrition:   Current diet is Regular. Medications:   Patient is not currently taking any over-the-counter supplements. Patient is able to manage medications. Activities of Daily Living (ADLs)/Instrumental Activities of Daily Living (IADLs):   Walk and transfer into and out of bed and chair?: Yes  Dress and groom yourself?: Yes    Bathe or shower yourself?: Yes    Feed yourself?  Yes  Do your laundry/housekeeping?: Yes  Manage your money, pay your bills and track your expenses?: Yes  Make your own meals?: Yes    Do your own shopping?: Yes    Previous Hospitalizations:   Any hospitalizations or ED visits within the last 12 months?: No      Advance Care Planning:   Living will: Yes    Durable POA for healthcare: Yes    Advanced directive: Yes      PREVENTIVE SCREENINGS      Cardiovascular Screening:    General: Screening Not Indicated and History Lipid Disorder      Diabetes Screening:     General: Screening Not Indicated and History Diabetes      Colorectal Cancer Screening:     General: Screening Current      Prostate Cancer Screening:    General: Screening Current      Abdominal Aortic Aneurysm (AAA) Screening:    Risk factors include: age between 70-75 yo and tobacco use        Lung Cancer Screening:     General: Screening Not Indicated      Hepatitis C Screening:    General: Screening Current    Screening, Brief Intervention, and Referral to Treatment (SBIRT)    Screening  Typical number of drinks in a day: 0  Typical number of drinks in a week: 0  Interpretation: Low risk drinking behavior. Single Item Drug Screening:  How often have you used an illegal drug (including marijuana) or a prescription medication for non-medical reasons in the past year? never    Single Item Drug Screen Score: 0  Interpretation: Negative screen for possible drug use disorder    No results found. Physical Exam:     There were no vitals taken for this visit.     Physical Exam     Opal Talley MD

## 2023-07-06 ENCOUNTER — TREATMENT (OUTPATIENT)
Dept: FAMILY MEDICINE CLINIC | Facility: CLINIC | Age: 73
End: 2023-07-06

## 2023-07-06 DIAGNOSIS — R80.9 MICROALBUMINURIA: Primary | ICD-10-CM

## 2023-07-06 PROCEDURE — 3066F NEPHROPATHY DOC TX: CPT | Performed by: FAMILY MEDICINE

## 2023-07-14 ENCOUNTER — TELEPHONE (OUTPATIENT)
Dept: FAMILY MEDICINE CLINIC | Facility: CLINIC | Age: 73
End: 2023-07-14

## 2023-07-17 ENCOUNTER — TREATMENT (OUTPATIENT)
Dept: FAMILY MEDICINE CLINIC | Facility: CLINIC | Age: 73
End: 2023-07-17

## 2023-07-17 ENCOUNTER — TRANSCRIBE ORDERS (OUTPATIENT)
Dept: FAMILY MEDICINE CLINIC | Facility: CLINIC | Age: 73
End: 2023-07-17

## 2023-07-17 ENCOUNTER — TELEPHONE (OUTPATIENT)
Dept: FAMILY MEDICINE CLINIC | Facility: CLINIC | Age: 73
End: 2023-07-17

## 2023-07-17 DIAGNOSIS — H91.90 DECREASED HEARING, UNSPECIFIED LATERALITY: Primary | ICD-10-CM

## 2023-07-17 DIAGNOSIS — Z79.4 TYPE 2 DIABETES MELLITUS WITH DIABETIC NEUROPATHY, WITH LONG-TERM CURRENT USE OF INSULIN (HCC): Primary | Chronic | ICD-10-CM

## 2023-07-17 DIAGNOSIS — E11.40 TYPE 2 DIABETES MELLITUS WITH DIABETIC NEUROPATHY, WITH LONG-TERM CURRENT USE OF INSULIN (HCC): Primary | Chronic | ICD-10-CM

## 2023-07-17 DIAGNOSIS — E11.40 TYPE 2 DIABETES MELLITUS WITH DIABETIC NEUROPATHY, WITHOUT LONG-TERM CURRENT USE OF INSULIN (HCC): Primary | Chronic | ICD-10-CM

## 2023-07-17 RX ORDER — INSULIN GLARGINE 100 [IU]/ML
INJECTION, SOLUTION SUBCUTANEOUS
Qty: 45 ML | Refills: 6 | Status: SHIPPED | OUTPATIENT
Start: 2023-07-17

## 2023-07-17 NOTE — TELEPHONE ENCOUNTER
----- Message from Tawanda Adames MD sent at 7/17/2023  1:32 PM EDT -----   I spoke with Renard Agosto about starting insulin ; he is agreeable ; I will write for the lantus ; he will need instruction on administration technique either at our office or a  diabetic educator in [facility]. He will also be able to eliminate some of his oral medications. I discontinued those on his med template. Let him know  which meds he may stop . He should start Lantus 15 units at suppertime meal daily. He should check morning fasting  blood sugars. We are targetting an average of 150 as goal. As long as his sugars are above that he should increase the dose of insulin by 5 units weeekly . Once his numbers are around 150 most times the dose of insulin should remain the same . Let me know if this is unclear : please contact him in next 24-48hr .

## 2023-07-21 ENCOUNTER — OFFICE VISIT (OUTPATIENT)
Dept: DIABETES SERVICES | Facility: CLINIC | Age: 73
End: 2023-07-21
Payer: COMMERCIAL

## 2023-07-21 DIAGNOSIS — E11.40 TYPE 2 DIABETES MELLITUS WITH DIABETIC NEUROPATHY, WITH LONG-TERM CURRENT USE OF INSULIN (HCC): Primary | Chronic | ICD-10-CM

## 2023-07-21 DIAGNOSIS — Z79.4 TYPE 2 DIABETES MELLITUS WITH DIABETIC NEUROPATHY, WITH LONG-TERM CURRENT USE OF INSULIN (HCC): Primary | Chronic | ICD-10-CM

## 2023-07-21 PROCEDURE — G0108 DIAB MANAGE TRN  PER INDIV: HCPCS

## 2023-07-24 NOTE — PROGRESS NOTES
Insulin Instruction  Met with Mariposa RosarioSharif and his wife for initial insulin start education. Martina Jorgensen is currently taking the following diabetes medications: metformin 500 mg twice a day with meals. Prescribed Insulin: Lantus 15 units at bedtime. Patient instructed on: Insulin type; timing of insulin; site selection and rotation; proper technique of injection and insulin administration, safe needle disposal; medication storage; side effects and precautions of insulin. Comments: Martina Jorgensen has good understanding of insulin usage and demonstrated use of injection technique in the office. Patient instruction completed on Hypoglycemia: definition/risk, causes/symptoms, treatment, prevention of hypoglycemia, when to notify physician and EMS. Comments: Martina Jorgensen has good understanding of hypoglycemia and it's treatment. Patient instruction completed on Hyperglycemia: definition, causes/symptoms, treatment, prevention of hypoglycemia, when to notify physician and EMS. Comments: Martina Jorgensen has good understanding of hyperglycemia and treatment. Diabetes Education Record: Martina Jorgensen was given the following education material: Fast 15 List, Hypoglycemia Fact Sheet, Hyperglycemia Fact Sheet. Patient response to instruction  Comprehension: good  Motivation: good  Expected Compliance: good  Readiness: action  Barriers to Learning: none known       Start- Stop: 4:01-4:31  Total Minutes: 30 Minutes  Group or Individual Instruction: DSMT-I  Other: Jillian Page MD    Thank you for referring your patient to [facility] [address], it was a pleasure working with them today. Please feel free to call with any questions or concerns.     Lorie Ballard  [address]  [address] [place] [id number]

## 2023-07-25 ENCOUNTER — OFFICE VISIT (OUTPATIENT)
Dept: PODIATRY | Facility: CLINIC | Age: 73
End: 2023-07-25
Payer: COMMERCIAL

## 2023-07-25 VITALS
DIASTOLIC BLOOD PRESSURE: 74 MMHG | BODY MASS INDEX: 27.76 KG/M2 | HEIGHT: 76 IN | HEART RATE: 87 BPM | WEIGHT: 228 LBS | SYSTOLIC BLOOD PRESSURE: 135 MMHG

## 2023-07-25 DIAGNOSIS — E11.40 TYPE 2 DIABETES MELLITUS WITH DIABETIC NEUROPATHY, WITHOUT LONG-TERM CURRENT USE OF INSULIN (HCC): Chronic | ICD-10-CM

## 2023-07-25 DIAGNOSIS — B35.1 TINEA UNGUIUM: ICD-10-CM

## 2023-07-25 DIAGNOSIS — L84 CORNS AND CALLOSITIES: Primary | ICD-10-CM

## 2023-07-25 PROCEDURE — 11055 PARING/CUTG B9 HYPRKER LES 1: CPT | Performed by: PODIATRIST

## 2023-07-25 PROCEDURE — 11720 DEBRIDE NAIL 1-5: CPT | Performed by: PODIATRIST

## 2023-07-25 NOTE — PROGRESS NOTES
Date Patient Seen: 7/25/23       Subjective:     Chief Complaint:  High risk DM foot appointment     Patient ID: Dorene Corbett. is a 72 y.o. male. Monica Jeffrey is here s/p complicated foot flap and amputation. He notes no further drainage nor pain at stump. He is wearing the custom insoles and shoes and his new ones are pending. The following portions of the patient's history were reviewed and updated as appropriate: allergies, current medications, past family history, past medical history, past social history, past surgical history and problem list.    Review of Systems   Constitutional: Positive for activity change. Negative for appetite change, chills, diaphoresis, fatigue, fever and unexpected weight change. Skin: Negative for wound. Neurological: Positive for numbness. Objective:      /74   Pulse 87   Ht 6' 4" (1.93 m)   Wt 103 kg (228 lb)   BMI 27.75 kg/m²        Physical Exam  Vitals and nursing note reviewed. Constitutional:       General: He is not in acute distress. Appearance: Normal appearance. He is not ill-appearing, toxic-appearing or diaphoretic. HENT:      Head: Normocephalic. Pulmonary:      Effort: Pulmonary effort is normal.      Breath sounds: Normal breath sounds. Musculoskeletal:      Comments: Transmetatarsal amputation. Large bulky, healed ALT flap intact. No sinus tracts, ulcers, infections. Skin:     Comments: I note a  callous lateral Right TMA stump. No ulcer. Nails yellow-brown, 5mm thick, dystrophic, with crumbling subugunal debris x 5. Neurological:      Mental Status: He is alert. Diagnoses and all orders for this visit:    Type 2 diabetes mellitus with diabetic neuropathy, without long-term current use of insulin ([facility])    Tinea unguium    Corns and callosities       Plan:  Nails x 5 were debrided with double-action nail forceps of their thickness, length and width.  Foot precautions reviewed with patient including the need to wear protective shoegear at all times when walking including in the home, the need to check feet all surfaces daily with a mirror and report and skin breaks to podiatry, the need to apply an emollient to skin of feet daily. Lesion Destruction    Date/Time: 7/25/2023 10:45 AM    Performed by: Mardee Buerger, DPM  Authorized by: Mardee Buerger, DPM  Universal Protocol:  Consent: Verbal consent obtained. Risks and benefits: risks, benefits and alternatives were discussed  Consent given by: patient  Time out: Immediately prior to procedure a "time out" was called to verify the correct patient, procedure, equipment, support staff and site/side marked as required.   Timeout called at: 7/25/2023 1:00 PM.  Patient understanding: patient states understanding of the procedure being performed  Patient identity confirmed: verbally with patient      Procedure Details - Lesion Destruction:     Number of Lesions:  1  Lesion 1:     Body area:  Lower extremity    Lower extremity location:  R foot    Malignancy: benign hyperkeratotic lesion      Destruction method: scissors used for extraction

## 2023-07-26 ENCOUNTER — OFFICE VISIT (OUTPATIENT)
Dept: AUDIOLOGY | Age: 73
End: 2023-07-26
Payer: COMMERCIAL

## 2023-07-26 ENCOUNTER — APPOINTMENT (OUTPATIENT)
Dept: AUDIOLOGY | Age: 73
End: 2023-07-26
Payer: COMMERCIAL

## 2023-07-26 DIAGNOSIS — H90.3 SENSORY HEARING LOSS, BILATERAL: Primary | ICD-10-CM

## 2023-07-26 DIAGNOSIS — H91.90 DECREASED HEARING, UNSPECIFIED LATERALITY: ICD-10-CM

## 2023-07-26 PROCEDURE — 92552 PURE TONE AUDIOMETRY AIR: CPT

## 2023-07-26 PROCEDURE — 92567 TYMPANOMETRY: CPT

## 2023-07-26 PROCEDURE — 92556 SPEECH AUDIOMETRY COMPLETE: CPT

## 2023-07-26 NOTE — PROGRESS NOTES
HEARING EVALUATION    Name:  Jody Acevedo. :  1950  Age:  72 y.o. Date of Evaluation: 23     History: Known Hearing Loss binaurally  Reason for visit: Jody Acevedo. is being seen today at the request of Dr. Cheo Brewer for an evaluation of hearing. Patient reports a longstanding mild sloping to severe sensorineural hearing loss, bilaterally. He purchased hearing aids at [facility] and he is very unhappy with them. He also has hearing aids from [employer] that he he enjoys. Hearing aids were briefly discussed and he is not interested in purchasing hearing aids at this time. EVALUATION:    Otoscopic Evaluation:   Right Ear: Clear and healthy ear canal and tympanic membrane   Left Ear: Clear and healthy ear canal and tympanic membrane    Tympanometry:   Right: Type A - normal middle ear pressure and compliance   Left: Type A - normal middle ear pressure and compliance    Audiogram Results:  Pure tone testing revealed a normal sloping to severe sensorineural hearing loss bilaterally. SRT and PTA are in agreement indicating good test reliability. Word recognition scores were excellent bilaterally. *see attached audiogram      RECOMMENDATIONS:  Annual hearing eval, Return to [facility]. for F/U and Copy to Patient/Caregiver    PATIENT EDUCATION:   Discussed results and recommendations with Porsha Preston. Questions were addressed and the patient was encouraged to contact our department should concerns arise. Natalio Yan.   Clinical Audiologist

## 2023-07-27 ENCOUNTER — TRANSCRIBE ORDERS (OUTPATIENT)
Dept: FAMILY MEDICINE CLINIC | Facility: CLINIC | Age: 73
End: 2023-07-27

## 2023-07-27 ENCOUNTER — TELEPHONE (OUTPATIENT)
Dept: FAMILY MEDICINE CLINIC | Facility: CLINIC | Age: 73
End: 2023-07-27

## 2023-07-31 ENCOUNTER — TELEPHONE (OUTPATIENT)
Dept: FAMILY MEDICINE CLINIC | Facility: CLINIC | Age: 73
End: 2023-07-31

## 2023-07-31 ENCOUNTER — TREATMENT (OUTPATIENT)
Dept: FAMILY MEDICINE CLINIC | Facility: CLINIC | Age: 73
End: 2023-07-31

## 2023-07-31 DIAGNOSIS — E11.40 TYPE 2 DIABETES MELLITUS WITH DIABETIC NEUROPATHY, WITHOUT LONG-TERM CURRENT USE OF INSULIN (HCC): ICD-10-CM

## 2023-07-31 DIAGNOSIS — E78.5 HYPERLIPIDEMIA, UNSPECIFIED HYPERLIPIDEMIA TYPE: Primary | Chronic | ICD-10-CM

## 2023-07-31 RX ORDER — LANCETS
EACH MISCELLANEOUS DAILY
Qty: 100 EACH | Refills: 3 | Status: SHIPPED | OUTPATIENT
Start: 2023-07-31

## 2023-07-31 NOTE — TELEPHONE ENCOUNTER
Pt verbalized understanding:     Increase dose of insulin by  5 units weekly until the average a.m. sugar is around 150; there is no maximum dose of insulin

## 2023-07-31 NOTE — TELEPHONE ENCOUNTER
Patient states he has increase his lantus to 17 units but his fasting blood sugar it is still 190-200.  What should be his next plan of care

## 2023-08-10 ENCOUNTER — VBI (OUTPATIENT)
Dept: ADMINISTRATIVE | Facility: OTHER | Age: 73
End: 2023-08-10

## 2023-09-01 DIAGNOSIS — E11.40 TYPE 2 DIABETES MELLITUS WITH DIABETIC NEUROPATHY, WITHOUT LONG-TERM CURRENT USE OF INSULIN (HCC): Chronic | ICD-10-CM

## 2023-09-04 RX ORDER — INSULIN GLARGINE 100 [IU]/ML
INJECTION, SOLUTION SUBCUTANEOUS
Qty: 45 ML | Refills: 6 | Status: SHIPPED | OUTPATIENT
Start: 2023-09-04

## 2023-10-16 DIAGNOSIS — N40.1 BENIGN PROSTATIC HYPERPLASIA WITH WEAK URINARY STREAM: Chronic | ICD-10-CM

## 2023-10-16 DIAGNOSIS — R39.12 BENIGN PROSTATIC HYPERPLASIA WITH WEAK URINARY STREAM: Chronic | ICD-10-CM

## 2023-10-16 RX ORDER — TAMSULOSIN HYDROCHLORIDE 0.4 MG/1
0.4 CAPSULE ORAL
Qty: 90 CAPSULE | Refills: 2 | Status: SHIPPED | OUTPATIENT
Start: 2023-10-16

## 2023-10-24 ENCOUNTER — TELEPHONE (OUTPATIENT)
Age: 73
End: 2023-10-24

## 2023-10-24 ENCOUNTER — APPOINTMENT (EMERGENCY)
Dept: RADIOLOGY | Facility: HOSPITAL | Age: 73
DRG: 854 | End: 2023-10-24
Payer: COMMERCIAL

## 2023-10-24 ENCOUNTER — HOSPITAL ENCOUNTER (INPATIENT)
Facility: HOSPITAL | Age: 73
LOS: 8 days | Discharge: HOME HEALTH | DRG: 854 | End: 2023-11-01
Attending: EMERGENCY MEDICINE | Admitting: INTERNAL MEDICINE
Payer: COMMERCIAL

## 2023-10-24 DIAGNOSIS — L03.116 CELLULITIS OF LEFT LOWER LEG: Primary | ICD-10-CM

## 2023-10-24 DIAGNOSIS — E11.621 DIABETIC ULCER OF OTHER PART OF LEFT FOOT ASSOCIATED WITH TYPE 2 DIABETES MELLITUS, WITH BONE INVOLVEMENT WITHOUT EVIDENCE OF NECROSIS (HCC): ICD-10-CM

## 2023-10-24 DIAGNOSIS — A41.9 SEPSIS (HCC): ICD-10-CM

## 2023-10-24 DIAGNOSIS — E11.40 TYPE 2 DIABETES MELLITUS WITH DIABETIC NEUROPATHY, WITHOUT LONG-TERM CURRENT USE OF INSULIN (HCC): Chronic | ICD-10-CM

## 2023-10-24 DIAGNOSIS — L97.526 DIABETIC ULCER OF OTHER PART OF LEFT FOOT ASSOCIATED WITH TYPE 2 DIABETES MELLITUS, WITH BONE INVOLVEMENT WITHOUT EVIDENCE OF NECROSIS (HCC): ICD-10-CM

## 2023-10-24 DIAGNOSIS — L03.116 CELLULITIS OF LEFT LOWER EXTREMITY: ICD-10-CM

## 2023-10-24 LAB
ALBUMIN SERPL BCP-MCNC: 4.1 G/DL (ref 3.5–5)
ALP SERPL-CCNC: 89 U/L (ref 34–104)
ALT SERPL W P-5'-P-CCNC: 17 U/L (ref 7–52)
ANION GAP SERPL CALCULATED.3IONS-SCNC: 7 MMOL/L
APTT PPP: 33 SECONDS (ref 23–37)
AST SERPL W P-5'-P-CCNC: 13 U/L (ref 13–39)
ATRIAL RATE: 163 BPM
BACTERIA UR QL AUTO: ABNORMAL /HPF
BASOPHILS # BLD AUTO: 0.03 THOUSANDS/ÂΜL (ref 0–0.1)
BASOPHILS NFR BLD AUTO: 0 % (ref 0–1)
BILIRUB SERPL-MCNC: 1.05 MG/DL (ref 0.2–1)
BILIRUB UR QL STRIP: NEGATIVE
BUN SERPL-MCNC: 19 MG/DL (ref 5–25)
CALCIUM SERPL-MCNC: 9.1 MG/DL (ref 8.4–10.2)
CHLORIDE SERPL-SCNC: 104 MMOL/L (ref 96–108)
CLARITY UR: ABNORMAL
CO2 SERPL-SCNC: 24 MMOL/L (ref 21–32)
COLOR UR: YELLOW
CREAT SERPL-MCNC: 0.94 MG/DL (ref 0.6–1.3)
CRP SERPL QL: 53.1 MG/L
EOSINOPHIL # BLD AUTO: 0.12 THOUSAND/ÂΜL (ref 0–0.61)
EOSINOPHIL NFR BLD AUTO: 1 % (ref 0–6)
ERYTHROCYTE [DISTWIDTH] IN BLOOD BY AUTOMATED COUNT: 13.6 % (ref 11.6–15.1)
ERYTHROCYTE [SEDIMENTATION RATE] IN BLOOD: 27 MM/HOUR (ref 0–19)
GFR SERPL CREATININE-BSD FRML MDRD: 81 ML/MIN/1.73SQ M
GLUCOSE SERPL-MCNC: 220 MG/DL (ref 65–140)
GLUCOSE SERPL-MCNC: 227 MG/DL (ref 65–140)
GLUCOSE SERPL-MCNC: 249 MG/DL (ref 65–140)
GLUCOSE SERPL-MCNC: 288 MG/DL (ref 65–140)
GLUCOSE UR STRIP-MCNC: ABNORMAL MG/DL
HCT VFR BLD AUTO: 44.8 % (ref 36.5–49.3)
HGB BLD-MCNC: 14.7 G/DL (ref 12–17)
HGB UR QL STRIP.AUTO: NEGATIVE
IMM GRANULOCYTES # BLD AUTO: 0.06 THOUSAND/UL (ref 0–0.2)
IMM GRANULOCYTES NFR BLD AUTO: 0 % (ref 0–2)
INR PPP: 1.37 (ref 0.84–1.19)
KETONES UR STRIP-MCNC: NEGATIVE MG/DL
LACTATE SERPL-SCNC: 1 MMOL/L (ref 0.5–2)
LEUKOCYTE ESTERASE UR QL STRIP: ABNORMAL
LYMPHOCYTES # BLD AUTO: 1.25 THOUSANDS/ÂΜL (ref 0.6–4.47)
LYMPHOCYTES NFR BLD AUTO: 8 % (ref 14–44)
MCH RBC QN AUTO: 32.4 PG (ref 26.8–34.3)
MCHC RBC AUTO-ENTMCNC: 32.8 G/DL (ref 31.4–37.4)
MCV RBC AUTO: 99 FL (ref 82–98)
MONOCYTES # BLD AUTO: 0.99 THOUSAND/ÂΜL (ref 0.17–1.22)
MONOCYTES NFR BLD AUTO: 7 % (ref 4–12)
NEUTROPHILS # BLD AUTO: 12.63 THOUSANDS/ÂΜL (ref 1.85–7.62)
NEUTS SEG NFR BLD AUTO: 84 % (ref 43–75)
NITRITE UR QL STRIP: NEGATIVE
NON-SQ EPI CELLS URNS QL MICRO: ABNORMAL /HPF
NRBC BLD AUTO-RTO: 0 /100 WBCS
PH UR STRIP.AUTO: 7.5 [PH]
PLATELET # BLD AUTO: 224 THOUSANDS/UL (ref 149–390)
PMV BLD AUTO: 10.6 FL (ref 8.9–12.7)
POTASSIUM SERPL-SCNC: 4.4 MMOL/L (ref 3.5–5.3)
PROCALCITONIN SERPL-MCNC: 0.09 NG/ML
PROT SERPL-MCNC: 7 G/DL (ref 6.4–8.4)
PROT UR STRIP-MCNC: ABNORMAL MG/DL
PROTHROMBIN TIME: 16.7 SECONDS (ref 11.6–14.5)
QRS AXIS: 72 DEGREES
QRSD INTERVAL: 102 MS
QT INTERVAL: 300 MS
QTC INTERVAL: 450 MS
RBC # BLD AUTO: 4.54 MILLION/UL (ref 3.88–5.62)
RBC #/AREA URNS AUTO: ABNORMAL /HPF
SODIUM SERPL-SCNC: 135 MMOL/L (ref 135–147)
SP GR UR STRIP.AUTO: 1.02 (ref 1–1.03)
T WAVE AXIS: 28 DEGREES
UROBILINOGEN UR STRIP-ACNC: <2 MG/DL
VENTRICULAR RATE: 135 BPM
WBC # BLD AUTO: 15.08 THOUSAND/UL (ref 4.31–10.16)
WBC #/AREA URNS AUTO: ABNORMAL /HPF

## 2023-10-24 PROCEDURE — 73630 X-RAY EXAM OF FOOT: CPT

## 2023-10-24 PROCEDURE — 96365 THER/PROPH/DIAG IV INF INIT: CPT

## 2023-10-24 PROCEDURE — 87077 CULTURE AEROBIC IDENTIFY: CPT | Performed by: EMERGENCY MEDICINE

## 2023-10-24 PROCEDURE — 83605 ASSAY OF LACTIC ACID: CPT | Performed by: EMERGENCY MEDICINE

## 2023-10-24 PROCEDURE — 86140 C-REACTIVE PROTEIN: CPT

## 2023-10-24 PROCEDURE — 99285 EMERGENCY DEPT VISIT HI MDM: CPT | Performed by: EMERGENCY MEDICINE

## 2023-10-24 PROCEDURE — 81001 URINALYSIS AUTO W/SCOPE: CPT | Performed by: EMERGENCY MEDICINE

## 2023-10-24 PROCEDURE — 93005 ELECTROCARDIOGRAM TRACING: CPT

## 2023-10-24 PROCEDURE — 93010 ELECTROCARDIOGRAM REPORT: CPT | Performed by: INTERNAL MEDICINE

## 2023-10-24 PROCEDURE — 90714 TD VACC NO PRESV 7 YRS+ IM: CPT

## 2023-10-24 PROCEDURE — 87086 URINE CULTURE/COLONY COUNT: CPT | Performed by: EMERGENCY MEDICINE

## 2023-10-24 PROCEDURE — 85730 THROMBOPLASTIN TIME PARTIAL: CPT | Performed by: EMERGENCY MEDICINE

## 2023-10-24 PROCEDURE — 82948 REAGENT STRIP/BLOOD GLUCOSE: CPT

## 2023-10-24 PROCEDURE — 99285 EMERGENCY DEPT VISIT HI MDM: CPT

## 2023-10-24 PROCEDURE — 85025 COMPLETE CBC W/AUTO DIFF WBC: CPT | Performed by: EMERGENCY MEDICINE

## 2023-10-24 PROCEDURE — 85610 PROTHROMBIN TIME: CPT | Performed by: EMERGENCY MEDICINE

## 2023-10-24 PROCEDURE — 96367 TX/PROPH/DG ADDL SEQ IV INF: CPT

## 2023-10-24 PROCEDURE — 84145 PROCALCITONIN (PCT): CPT | Performed by: EMERGENCY MEDICINE

## 2023-10-24 PROCEDURE — 80053 COMPREHEN METABOLIC PANEL: CPT | Performed by: EMERGENCY MEDICINE

## 2023-10-24 PROCEDURE — 87040 BLOOD CULTURE FOR BACTERIA: CPT | Performed by: EMERGENCY MEDICINE

## 2023-10-24 PROCEDURE — 36415 COLL VENOUS BLD VENIPUNCTURE: CPT | Performed by: EMERGENCY MEDICINE

## 2023-10-24 PROCEDURE — 85652 RBC SED RATE AUTOMATED: CPT

## 2023-10-24 RX ORDER — POLYETHYLENE GLYCOL 3350 17 G/17G
17 POWDER, FOR SOLUTION ORAL DAILY PRN
Status: DISCONTINUED | OUTPATIENT
Start: 2023-10-24 | End: 2023-11-01 | Stop reason: HOSPADM

## 2023-10-24 RX ORDER — VANCOMYCIN HYDROCHLORIDE 1 G/200ML
1000 INJECTION, SOLUTION INTRAVENOUS EVERY 12 HOURS
Status: DISCONTINUED | OUTPATIENT
Start: 2023-10-24 | End: 2023-10-27

## 2023-10-24 RX ORDER — LISINOPRIL 20 MG/1
40 TABLET ORAL DAILY
Status: DISCONTINUED | OUTPATIENT
Start: 2023-10-25 | End: 2023-10-26

## 2023-10-24 RX ORDER — ATORVASTATIN CALCIUM 40 MG/1
40 TABLET, FILM COATED ORAL DAILY
Status: DISCONTINUED | OUTPATIENT
Start: 2023-10-25 | End: 2023-11-01 | Stop reason: HOSPADM

## 2023-10-24 RX ORDER — TAMSULOSIN HYDROCHLORIDE 0.4 MG/1
0.4 CAPSULE ORAL
Status: DISCONTINUED | OUTPATIENT
Start: 2023-10-24 | End: 2023-11-01 | Stop reason: HOSPADM

## 2023-10-24 RX ORDER — ACETAMINOPHEN 325 MG/1
975 TABLET ORAL EVERY 6 HOURS PRN
Status: DISCONTINUED | OUTPATIENT
Start: 2023-10-24 | End: 2023-11-01 | Stop reason: HOSPADM

## 2023-10-24 RX ORDER — INSULIN LISPRO 100 [IU]/ML
1-6 INJECTION, SOLUTION INTRAVENOUS; SUBCUTANEOUS
Status: DISCONTINUED | OUTPATIENT
Start: 2023-10-24 | End: 2023-11-01 | Stop reason: HOSPADM

## 2023-10-24 RX ORDER — SENNOSIDES 8.6 MG
1 TABLET ORAL DAILY
Status: DISCONTINUED | OUTPATIENT
Start: 2023-10-24 | End: 2023-11-01 | Stop reason: HOSPADM

## 2023-10-24 RX ORDER — ACETAMINOPHEN 325 MG/1
975 TABLET ORAL ONCE
Status: COMPLETED | OUTPATIENT
Start: 2023-10-24 | End: 2023-10-24

## 2023-10-24 RX ADMIN — TAMSULOSIN HYDROCHLORIDE 0.4 MG: 0.4 CAPSULE ORAL at 17:46

## 2023-10-24 RX ADMIN — ACETAMINOPHEN 975 MG: 325 TABLET, FILM COATED ORAL at 11:43

## 2023-10-24 RX ADMIN — VANCOMYCIN HYDROCHLORIDE 1500 MG: 1 INJECTION, POWDER, LYOPHILIZED, FOR SOLUTION INTRAVENOUS at 12:16

## 2023-10-24 RX ADMIN — APIXABAN 5 MG: 5 TABLET, FILM COATED ORAL at 20:07

## 2023-10-24 RX ADMIN — CEFEPIME 2000 MG: 2 INJECTION, POWDER, FOR SOLUTION INTRAVENOUS at 22:45

## 2023-10-24 RX ADMIN — INSULIN LISPRO 2 UNITS: 100 INJECTION, SOLUTION INTRAVENOUS; SUBCUTANEOUS at 19:55

## 2023-10-24 RX ADMIN — ACETAMINOPHEN 975 MG: 325 TABLET, FILM COATED ORAL at 20:12

## 2023-10-24 RX ADMIN — CEFEPIME 2000 MG: 2 INJECTION, POWDER, FOR SOLUTION INTRAVENOUS at 11:43

## 2023-10-24 RX ADMIN — CLOSTRIDIUM TETANI TOXOID ANTIGEN (FORMALDEHYDE INACTIVATED) AND CORYNEBACTERIUM DIPHTHERIAE TOXOID ANTIGEN (FORMALDEHYDE INACTIVATED) 0.5 ML: 5; 2 INJECTION, SUSPENSION INTRAMUSCULAR at 17:04

## 2023-10-24 RX ADMIN — SODIUM CHLORIDE 1000 ML: 0.9 INJECTION, SOLUTION INTRAVENOUS at 11:30

## 2023-10-24 RX ADMIN — VANCOMYCIN HYDROCHLORIDE 1000 MG: 1 INJECTION, SOLUTION INTRAVENOUS at 19:51

## 2023-10-24 RX ADMIN — METOPROLOL SUCCINATE 75 MG: 50 TABLET, EXTENDED RELEASE ORAL at 20:07

## 2023-10-24 RX ADMIN — INSULIN LISPRO 2 UNITS: 100 INJECTION, SOLUTION INTRAVENOUS; SUBCUTANEOUS at 22:38

## 2023-10-24 RX ADMIN — DILTIAZEM HYDROCHLORIDE 90 MG: 60 TABLET, FILM COATED ORAL at 20:07

## 2023-10-24 NOTE — ASSESSMENT & PLAN NOTE
/74 on arrival. Home regimen: Cardizem 90 mg BID, Lisinopril 40 mg daily, Metoprolol XL 75 mg BID. BP remains relatively well-controlled at present.     Plan:  -Continue to monitor BP  -Continue cardizem 120 mg BID  -Continue metoprolol 100 mg BID  -continue lisinopril 40 mg  -Follow up with PCP within 1 to 2 weeks of discharge

## 2023-10-24 NOTE — ED ATTENDING ATTESTATION
10/24/2023  I, Lieutenant Iglesia MD, saw and evaluated the patient. I have discussed the patient with the resident/non-physician practitioner and agree with the resident's/non-physician practitioner's findings, Plan of Care, and MDM as documented in the resident's/non-physician practitioner's note, except where noted. All available labs and Radiology studies were reviewed. I was present for key portions of any procedure(s) performed by the resident/non-physician practitioner and I was immediately available to provide assistance. At this point I agree with the current assessment done in the Emergency Department. I have conducted an independent evaluation of this patient a history and physical is as follows:    ED Course     72-year-old male, history of diabetes, history of peripheral vascular disease, presenting to the emergency department for evaluation of left lower extremity pain, erythema, warmth, and fever. Symptoms have been present for 1 day. No associated chest pain, cough, shortness of breath, abdominal pain, nausea, vomiting, diarrhea.     Past Medical History:   Diagnosis Date    Arthritis     Atrial fibrillation ([address])     Diagnosed 11/2018    Benign prostate hyperplasia 04/2002    Cellulitis     right lower leg     Colon polyp 2006    Diabetes mellitus ([address])     Hearing aid worn     bilat    Hearing loss     90% loss left ear and 40% right ear    History of clubfoot     "since birth"    History of pneumonia     History of TIA (transient ischemic attack) 04/25/2017 11/30/18 pt denies    Hyperlipidemia 5/15/2014    Hypertension     Infectious viral hepatitis     "cant remember type"    Irregular heart beat     Afib    Neuropathy     both feet    Osteomyelitis ([address])     right great toe    Pneumonia     Right middle lobe pulmonary nodule 11/6/2018    Seasickness     Teeth missing     Type 2 diabetes mellitus with diabetic neuropathy ([address]) 11/6/2018    Wears glasses     reading    Wound, open bottom of right foot     On examination the patient is tachycardic. Patient is noted to be febrile. Patient appears nontoxic. The patient is resting comfortably on a stretcher in no acute respiratory distress. HEENT reveals moist mucous membranes. Head is normocephalic and atraumatic. Conjunctiva and sclera are normal. Neck is nontender and supple with full range of motion to flexion, extension, lateral rotation. No meningismus appreciated. No masses are appreciated. Lungs are clear to auscultation bilaterally without any wheezes, rales or rhonchi. Heart is tachycardic and regular without any murmurs, rubs or gallops. Abdomen is soft and nontender without any rebound or guarding. Patient with right lower extremity forefoot amputation. On left lower extremity, the patient is noted to have a callus at the base of the fourth and fifth phalanges with a crack in the center, and then erythema over the dorsum of the midfoot with some streaking up the leg past the ankle. Patient with venous stasis changes in the left lower extremity, but reportedly with increased erythema per wife. . Patient is awake, alert, and oriented x3. The patient has normal interaction. Cranial nerves grossly intact. Motor is 5 out of 5 bilateral upper and lower extremities. MEDICAL DECISION MAKING    Number and Complexity of Problems  Differential diagnosis: Cellulitis with multiple SIRS criteria indicating sepsis from cellulitis. Medical Decision Making Data  External documents reviewed: Reviewed office visit with primary care physician on 7/5/2023 for atrial fibrillation with rapid ventricular response. My EKG interpretation: Atrial fibrillation with rapid ventricular response. No acute ST-T wave changes. My X-ray interpretation: No acute bony abnormalities. No subcutaneous emphysema. XR foot 3+ views LEFT   ED Interpretation   No acute bony abnormalities. No subcutaneous emphysema.           Labs Reviewed   PROTIME-INR - Abnormal       Result Value Ref Range Status    Protime 16.7 (*) 11.6 - 14.5 seconds Final    INR 1.37 (*) 0.84 - 1.19 Final   CBC AND DIFFERENTIAL - Abnormal    WBC 15.08 (*) 4.31 - 10.16 Thousand/uL Final    RBC 4.54  3.88 - 5.62 Million/uL Final    Hemoglobin 14.7  12.0 - 17.0 g/dL Final    Hematocrit 44.8  36.5 - 49.3 % Final    MCV 99 (*) 82 - 98 fL Final    MCH 32.4  26.8 - 34.3 pg Final    MCHC 32.8  31.4 - 37.4 g/dL Final    RDW 13.6  11.6 - 15.1 % Final    MPV 10.6  8.9 - 12.7 fL Final    Platelets 943  888 - 390 Thousands/uL Final    nRBC 0  /100 WBCs Final    Neutrophils Relative 84 (*) 43 - 75 % Final    Immat GRANS % 0  0 - 2 % Final    Lymphocytes Relative 8 (*) 14 - 44 % Final    Monocytes Relative 7  4 - 12 % Final    Eosinophils Relative 1  0 - 6 % Final    Basophils Relative 0  0 - 1 % Final    Neutrophils Absolute 12.63 (*) 1.85 - 7.62 Thousands/µL Final    Immature Grans Absolute 0.06  0.00 - 0.20 Thousand/uL Final    Lymphocytes Absolute 1.25  0.60 - 4.47 Thousands/µL Final    Monocytes Absolute 0.99  0.17 - 1.22 Thousand/µL Final    Eosinophils Absolute 0.12  0.00 - 0.61 Thousand/µL Final    Basophils Absolute 0.03  0.00 - 0.10 Thousands/µL Final   COMPREHENSIVE METABOLIC PANEL - Abnormal    Sodium 135  135 - 147 mmol/L Final    Potassium 4.4  3.5 - 5.3 mmol/L Final    Chloride 104  96 - 108 mmol/L Final    CO2 24  21 - 32 mmol/L Final    ANION GAP 7  mmol/L Final    BUN 19  5 - 25 mg/dL Final    Creatinine 0.94  0.60 - 1.30 mg/dL Final    Comment: Standardized to IDMS reference method    Glucose 288 (*) 65 - 140 mg/dL Final    Comment: If the patient is fasting, the ADA then defines impaired fasting glucose as > 100 mg/dL and diabetes as > or equal to 123 mg/dL. Calcium 9.1  8.4 - 10.2 mg/dL Final    AST 13  13 - 39 U/L Final    ALT 17  7 - 52 U/L Final    Comment: Specimen collection should occur prior to Sulfasalazine administration due to the potential for falsely depressed results. Alkaline Phosphatase 89  34 - 104 U/L Final    Total Protein 7.0  6.4 - 8.4 g/dL Final    Albumin 4.1  3.5 - 5.0 g/dL Final    Total Bilirubin 1.05 (*) 0.20 - 1.00 mg/dL Final    Comment: Use of this assay is not recommended for patients undergoing treatment with eltrombopag due to the potential for falsely elevated results. N-acetyl-p-benzoquinone imine (metabolite of Acetaminophen) will generate erroneously low results in samples for patients that have taken an overdose of Acetaminophen.     eGFR 81  ml/min/1.73sq m Final    Narrative:     National Kidney Disease Foundation guidelines for Chronic Kidney Disease (CKD):     Stage 1 with normal or high GFR (GFR > 90 mL/min/1.73 square meters)    Stage 2 Mild CKD (GFR = 60-89 mL/min/1.73 square meters)    Stage 3A Moderate CKD (GFR = 45-59 mL/min/1.73 square meters)    Stage 3B Moderate CKD (GFR = 30-44 mL/min/1.73 square meters)    Stage 4 Severe CKD (GFR = 15-29 mL/min/1.73 square meters)    Stage 5 End Stage CKD (GFR <15 mL/min/1.73 square meters)  Note: GFR calculation is accurate only with a steady state creatinine   UA W REFLEX TO MICROSCOPIC WITH REFLEX TO CULTURE - Abnormal    Color, UA Yellow   Final    Clarity, UA Turbid   Final    Specific Gravity, UA 1.022  1.003 - 1.030 Final    Comment: High concentrations of glucose or protein may affect the specific gravity    pH, UA 7.5  4.5, 5.0, 5.5, 6.0, 6.5, 7.0, 7.5, 8.0 Final    Leukocytes, UA Large (*) Negative Final    Nitrite, UA Negative  Negative Final    Protein, UA 50 (1+) (*) Negative mg/dl Final    Glucose, UA >=1000 (1%) (*) Negative mg/dl Final    Comment: Elevated glucose may cause false negative leukocyte esterase    Ketones, UA Negative  Negative mg/dl Final    Urobilinogen, UA <2.0  <2.0 mg/dl mg/dl Final    Bilirubin, UA Negative  Negative Final    Occult Blood, UA Negative  Negative Final   SEDIMENTATION RATE - Abnormal    Sed Rate 27 (*) 0 - 19 mm/hour Final   C-REACTIVE PROTEIN - Abnormal CRP 53.1 (*) <3.0 mg/L Final   URINE MICROSCOPIC - Abnormal    RBC, UA None Seen  None Seen, 1-2 /hpf Final    WBC, UA Innumerable (*) None Seen, 1-2 /hpf Final    Epithelial Cells None Seen  None Seen, Occasional /hpf Final    Bacteria, UA None Seen  None Seen, Occasional /hpf Final   APTT - Normal    PTT 33  23 - 37 seconds Final    Comment: Therapeutic Heparin Range =  60-90 seconds   LACTIC ACID, PLASMA (W/REFLEX IF RESULT > 2.0) - Normal    LACTIC ACID 1.0  0.5 - 2.0 mmol/L Final    Narrative:     Result may be elevated if tourniquet was used during collection. PROCALCITONIN TEST - Normal    Procalcitonin 0.09  <=0.25 ng/ml Final    Comment: Suspected Lower Respiratory Tract Infection (LRTI):  - LESS than or EQUAL to 0.25 ng/mL:   low likelihood for bacterial LRTI; antibiotics DISCOURAGED.  - GREATER than 0.25 ng/mL:   increased likelihood for bacterial LRTI; antibiotics ENCOURAGED. Suspected Sepsis:  - Strongly consider initiating antibiotics in ALL UNSTABLE patients. - LESS than or EQUAL to 0.5 ng/mL:   low likelihood for bacterial sepsis; antibiotics DISCOURAGED.  - GREATER than 0.5 ng/mL:   increased likelihood for bacterial sepsis; antibiotics ENCOURAGED.  - GREATER than 2 ng/mL:   high risk for severe sepsis / septic shock; antibiotics strongly ENCOURAGED. Decisions on antibiotic use should not be based solely on Procalcitonin (PCT) levels. If PCT is low but uncertainty exists with stopping antibiotics, repeat PCT in 6-24 hours to confirm the low level. If antibiotics are administered (regardless if initial PCT was high or low), repeat PCT every 1-2 days to consider early antibiotic cessation (when GREATER than 80% decrease from the peak OR when PCT drops below designated cutoffs, whichever comes first), so long as the infection is NOT one that typically requires prolonged treatment durations (e.g., bone/joint infections, endocarditis, Staph. aureus bacteremia).     Situations of FALSE-POSITIVE Procalcitonin values:  1) Newborns < 67 hours old  2) Massive stress from severe trauma / burns, major surgery, acute pancreatitis, cardiogenic / hemorrhagic shock, sickle cell crisis, or other organ perfusion abnormalities  3) Malaria and some Candidal infections  4) Treatment with agents that stimulate cytokines (e.g., OKT3, anti-lymphocyte globulins, alemtuzumab, IL-2, granulocyte transfusion [NOT GCSFs])  5) Chronic renal disease causes elevated baseline levels (consider GREATER than 0.75 ng/mL as an abnormal cut-off); initiating HD/CRRT may cause transient decreases  6) Paraneoplastic syndromes from medullary thyroid or SCLC, some forms of vasculitis, and acute [id number] disease    Situations of FALSE-NEGATIVE Procalcitonin values:  1) Too early in clinical course for PCT to have reached its peak (may repeat in 6-24 hours to confirm low level)  2) Localized infection WITHOUT systemic (SIRS / sepsis) response (e.g., an abscess, osteomyelitis, cystitis)  3) Mycobacteria (e.g., Tuberculosis, MAC)  4) Cystic fibrosis exacerbations     BLOOD CULTURE   BLOOD CULTURE   URINE CULTURE   PLATELET COUNT       Labs reviewed by me are significant for:  Normal lactate. Hyperglycemia. Clinical decision rules/scores are significant for: Patient with cellulitis with tachycardia and fever, concern for sepsis. Discussed case with: Admitting hospitalist.  Considered admission for: Cellulitis with sepsis. Treatment and Disposition  ED course: Patient remained hemodynamically stable in the emergency department. He was treated with antibiotics for cellulitis. Patient will require admission pending blood cultures. Shared decision making: Patient agreeable to admission. Code status: Full code.                 Critical Care Time  Procedures

## 2023-10-24 NOTE — TELEPHONE ENCOUNTER
I spoke with pt he stated that the leg is warm to touch from the groin area down the back of his calf and he is also experiencing pain in this area. I advised him to go to ED for evaluation and we'll keep his scheduled appt for now. He verbalized understanding and was agreeable.

## 2023-10-24 NOTE — ASSESSMENT & PLAN NOTE
Lab Results   Component Value Date    HGBA1C 9.8 (H) 10/25/2023       Recent Labs     10/30/23  0815 10/30/23  1128 10/30/23  1600 10/30/23  2016   POCGLU 249* 226* 167* 187*       Blood Sugar Average: Last 72 hrs:    Patient states he currently uses Lantus 41 units PM, Metformin 500 mg BID - on further review of his chart, this does not appear to be accurate.     Plan:  -Monitor BG  -Increase insulin regimen to 7 units Lantus HS with 6 units Humalog before meals  -Carb-controlled diet recommended  -Follow up with PCP within 1 to 2 weeks of discharge for further titration as needed

## 2023-10-24 NOTE — TELEPHONE ENCOUNTER
Caller: Shaylee Acosta    Doctor/Office: Dr. Faby Cunningham    CB#: [phone]    Escalation: Care Patient has a follow up tomorrow 10-25, however his foot/leg area is warm to the touch, swollen. He had this happen before and was advised to go to the ED. He would like to speak with someone about what he should do or if he should wait to be seen tomorrow in office. Please advise.  Thank you

## 2023-10-24 NOTE — H&P
INTERNAL MEDICINE RESIDENCY ADMISSION H&P     Name: Gemini Loaiza. Age & Sex: 72 y.o. male   MRN: [id number]  Unit/Bed#: QCB   Encounter: [id number]  Primary Care Provider: Ann Steel MD    Code Status: Level 1 - Full Code  Admission Status: INPATIENT   Disposition: Patient requires Med/Surg    Admit to team: SOD Team C     ASSESSMENT/PLAN     Principal Problem:    Cellulitis of left lower extremity  Active Problems:    Sepsis ([address])    Atrial fibrillation with RVR ([address])    Hyperlipidemia    Type 2 diabetes mellitus with diabetic neuropathy ([address])    Essential hypertension    Benign prostatic hyperplasia with lower urinary tract symptoms    S/P transmetatarsal amputation of foot, right (HCC)      * Cellulitis of left lower extremity  Assessment & Plan  Patient presents to ED today with 1-day history of increased erythema, warmth, and pain in LLE. He states he is unsure if he sustained a puncture wound on the bottom of his foot. He thinks it's possible he may have stepped on a nail when helping his daughter renovate her home. He did endorses some blood on his sock. Patient does have significant diabetic polyneuropathy and has undergone a transmetatarsal amputation on right foot in 2021. Patient was scheduled to see podiatry tomorrow. On arrival patient met SIRS criteria with T 101.4, . Labs were significant for leukocytosis of 15.08. Lactic acid was normal. Procal negative. CRP and ESR elevated. Preliminary read of XR looks normal with no signs of osteomyelitis. S/p IV vancomycin and cefepime x1, 1 L NS, Tylenol 975 mg in ED. On physical exam, LLE had significant erythema, swelling, and warmth. Patient states pain has improved. Puncture wound noted on sole of left foot.      Plan:  -Continue IV Vancomycin 1g q12h and Cefepime 2g q8h  -Follow-up pending blood cultures  -Follow-up official read of left foot XR  -Will give TD  -Monitor WBC and fever curves   -Tylenol PRN for fever, pain  -Podiatry consult not indicated at this time, can consider if needed  -Outpatient follow-up with podiatry recommended. Sepsis ([address])  Assessment & Plan  On arrival patient met SIRS criteria with T 101.4, . Labs were significant for leukocytosis of 15.08. Lactic acid was normal. Procal negative. CRP and ESR elevated. Preliminary read of XR looks normal with no signs of osteomyelitis. S/p IV vancomycin and cefepime x1, 1 L NS, Tylenol 975 mg in ED. Likely 2/2 to cellulitis of left lower extremity. UA significant for large leukocytes, 1+ protein, >1000 glucose, innumerable WBC. Negative nitrites. Urine culture pending. Patient does not endorse any urinary symptoms. Plan:  -Follow-up blood cultures, urine culture, MRSA  -Continue IV Vanc 1g q12h, IV cefepime 2g q12h  -Trend WBC, fever curves  -Tylenol PRN for fever, pain  -See plan under "cellulitis of left lower extremity"    Atrial fibrillation with RVR (HCC)  Assessment & Plan  EKG on arrival significant for "Afib RVR, incomplete RBBB, septal infarct (age undetermined). " Home regimen includes: Cardizem 90 mg BID, Eliquis 5 mg BID, Metoprolol 75 mg BID. Tachycardia partially 2/2 sepsis due to cellulitis. Plan:  -Continue home Cardizem 90 mg BID  -Continue home Eliquis 5 mg BID, [id number] score 5  -Continue home Metoprolol 75 mg BID  -Monitor on telemetry       S/P transmetatarsal amputation of foot, right (HCC)  Assessment & Plan  Stable    Benign prostatic hyperplasia with lower urinary tract symptoms  Assessment & Plan  Plan:  -Continue home Flomax 0.4 mg daily      Essential hypertension  Assessment & Plan  /72 on arrival, down to 132/77. Home regimen: Cardizem 90 mg BID, Lisinopril 40 mg daily, Metoprolol XL 75 mg BID.     Plan:  -Continue to monitor BP  -Continue home cardizem, lisinopril, and metoprolol XL    Type 2 diabetes mellitus with diabetic neuropathy [facility])  Assessment & Plan  Lab Results   Component Value Date HGBA1C 8.8 (A) 07/05/2023       No results for input(s): "POCGLU" in the last 72 hours. Blood Sugar Average: Last 72 hrs:    Patient states he currently uses Lantus 41 units PM, Metformin 500 mg BID    Plan:  -Monitor BG  -Start on SSI, can restart home Lantus after monitoring BG inpatient   -Carb-controlled diet         Hyperlipidemia  Assessment & Plan  Plan:  -Continue home Lipitor 40 mg daily         VTE Pharmacologic Prophylaxis: Eliquis  VTE Mechanical Prophylaxis: sequential compression device    CHIEF COMPLAINT     Chief Complaint   Patient presents with    Cellulitis     Patient f/u appt with podiatry tomorrow, however, leg is swollen, red and warm to touch. Advised to come to ED for eval.       HISTORY OF PRESENT ILLNESS     Mr. Francia Fraga. is a 72 y.o. male with a past medical history of DMT2 with diabetic neuropathy s/p R foot transmetatarsal amputation (2/2021), atrial fibrillation (on Eliquis), HTN, HLD, and BPH  who presents to the ED as advised by his podiatrist this morning due to concern for LLE cellulitis. Patient is unsure if he had a puncture wound due to diabetic neuropathy. He states he might have stepped on a nail from visiting his daughter house which was still under construction recently. He states he and his wife both saw blood in his sock. He started feeling warm sensation and swelling in his left leg above the ankle to medial side of his left thigh 4-5 days ago. He also endorses the pain is worse with movements, but seems to improve at this time with IV antibiotics. Patient has had cellulitis in the left leg in the past, and has been following with podiatrist, Dr. Kamila Melchor. Denies fever, chills, IV drug use, recent antibiotic use, recent travel history, and sick contacts. Also denies cough, shortness of breath, and chest pain or chest discomfort. Patient also endorses a 2-wk history of increased urinary frequency, but denies dysuria and hematuria. Follows with podiatry outpatient. In the ED, patient was febrile with a temp of 101.4, tachycardia 124 bpm, and elevated BP of 162/117. His labs were notable for elevated glucose of 288, leukocytosis with a WBC of 15, elevated CRP 53, and elevated ESR 27. Negative procalcitonin and lactic acid. UA notable for >=1k glucose, large leukocyte esterase, proteinuria, and innumerable WBC. L foot X-ray pending. EKG was significant for Afib RVR, incomplete RBBB, and septal infarct. S/p 975 mg Tylenol x1, 2g cefepime x1, vancomycin (15mg/kg) x1, and 1L NS bolus in the ED. Patient was seen and examined at bedside. He was laying comfortably in bed in no acute distress. Physical exam was notable for erythema, swelling, and redness of LLE. There was a small puncture wound noted on sole of left food. He is s/p transmetatarsal amputation of right foot. Stump looks intact with no concerning swelling, drainage, or erythema. Patient states his pain has improved since presentation. Otherwise, no additional complaints or concerns at this time. Patient was admitted to [facility] for further management of cellulitis of LLE. Full code. REVIEW OF SYSTEMS     Review of Systems   Constitutional:  Negative for chills and fever. Respiratory:  Negative for cough and shortness of breath. Cardiovascular:  Positive for leg swelling (left leg above ankle up to thigh). Negative for chest pain and palpitations. Gastrointestinal:  Negative for abdominal distention, abdominal pain, diarrhea, nausea and vomiting. Endocrine: Positive for polyuria. Genitourinary:  Positive for frequency. Negative for difficulty urinating, dysuria and hematuria. Skin:  Positive for color change (Swelling and red in LLE) and wound (unclear if he had puncture wound from stepping on nail). Neurological:  Negative for dizziness, weakness, light-headedness and headaches. All other systems reviewed and are negative.     OBJECTIVE     Vitals:    10/24/23 1108 10/24/23 1300 10/24/23 1445 BP: (!) 162/117 132/77 131/75   BP Location: Left arm     Pulse: (!) 124 (!) 109 85   Resp: 20 22 18   Temp: (!) 101.4 °F (38.6 °C)     TempSrc: Oral     SpO2: 97% 96% 98%      Temperature:   Temp (24hrs), Av.4 °F (38.6 °C), Min:101.4 °F (38.6 °C), Max:101.4 °F (38.6 °C)    Temperature: (!) 101.4 °F (38.6 °C)  Intake & Output:  I/O         10/22 0701  10/23 0700 10/23 0701  10/24 0700 10/24 0701  10/25 07    IV Piggyback   53.3    Total Intake   53.3    Net   +53.3                 Weights: There is no height or weight on file to calculate BMI. Weight (last 2 days)       None            Physical Exam  Vitals and nursing note reviewed. Constitutional:       General: He is not in acute distress. Appearance: Normal appearance. He is not ill-appearing or diaphoretic. HENT:      Head: Normocephalic and atraumatic. Nose: No congestion. Eyes:      General: No scleral icterus. Extraocular Movements: Extraocular movements intact. Conjunctiva/sclera: Conjunctivae normal.   Cardiovascular:      Rate and Rhythm: Tachycardia present. Rhythm irregular. Pulses: Normal pulses. Heart sounds: Normal heart sounds. No murmur heard. No gallop. Pulmonary:      Effort: Pulmonary effort is normal. No respiratory distress. Breath sounds: Normal breath sounds. No wheezing or rales. Abdominal:      General: Bowel sounds are normal. There is no distension. Palpations: Abdomen is soft. Tenderness: There is no abdominal tenderness. There is no guarding. Musculoskeletal:         General: Swelling present. No tenderness. Normal range of motion. Cervical back: Normal range of motion. Comments: Transmetatarsal amputation on right foot   Skin:     General: Skin is warm. Findings: Erythema (LLE above ankle of the medial side) present.       Comments: A small puncture wound seen on the sole of the left foot    Neurological:      Mental Status: He is alert and oriented to person, place, and time. Mental status is at baseline. Sensory: Sensory deficit (Sensory deficit in left foot sole) present. Psychiatric:         Mood and Affect: Mood normal.         Behavior: Behavior normal.         Thought Content:  Thought content normal.         Judgment: Judgment normal.       PAST MEDICAL HISTORY     Past Medical History:   Diagnosis Date    Arthritis     Atrial fibrillation ([address])     Diagnosed 11/2018    Benign prostate hyperplasia 04/2002    Cellulitis     right lower leg     Colon polyp 2006    Diabetes mellitus ([address])     Hearing aid worn     bilat    Hearing loss     90% loss left ear and 40% right ear    History of clubfoot     "since birth"    History of pneumonia     History of TIA (transient ischemic attack) 04/25/2017 11/30/18 pt denies    Hyperlipidemia 5/15/2014    Hypertension     Infectious viral hepatitis     "cant remember type"    Irregular heart beat     Afib    Neuropathy     both feet    Osteomyelitis ([address])     right great toe    Pneumonia     Right middle lobe pulmonary nodule 11/6/2018    Seasickness     Teeth missing     Type 2 diabetes mellitus with diabetic neuropathy ([address]) 11/6/2018    Wears glasses     reading    Wound, open     bottom of right foot     PAST SURGICAL HISTORY     Past Surgical History:   Procedure Laterality Date    BUNIONECTOMY Right 12/4/2018    Procedure: 5TH METATARSAL BONE PARTIAL RESECTION, FULL THICKNESS DEBRIDEMENT OF DIABETIC ULCER;  Surgeon: Saul Alonso DPM;  [facility];  Service: Podiatry    CLUB FOOT RELEASE Bilateral     COLONOSCOPY      COMPLEX WOUND CLOSURE TO EXTREMITY  4/27/2021    Procedure: COMPLEX WOUND CLOSURE TO EXTREMITY: RIGHT FOOT;  Surgeon: Christina Guzman MD;  Location:  MAIN OR;  Service: Plastics    EXTERNAL FIXATOR APPLICATION Right 7/00/9729    Procedure: Application multiplane external fixation;  Surgeon: Burke Castellano DPM;  [facility];  Service: Podiatry    FOOT HARDWARE REMOVAL Right 2/17/2021    Procedure: REMOVAL EXTERNAL FIXATOR;  Surgeon: Dennise Sparks DPM;  [facility];  Service: Podiatry    FOREIGN BODY REMOVAL Right 4/27/2021    Procedure: REMOVAL FOREIGN BODY EXTREMITY: RIGHT FOOT;  Surgeon: Harlan Buckner MD;  [facility];  Service: Plastics    INCISION AND DRAINAGE OF WOUND Right 2/17/2021    Procedure: INCISION AND DRAINAGE (I&D) EXTREMITY;  Surgeon: Dennise Sparks DPM;  [facility];  Service: Podiatry    ORIF FOOT FRACTURE Right 3/4/2021    Procedure: VAC PLACEMENT; SCREW FIXATION RIGHT FOOT FUSION;  Surgeon: Dennise Sparks DPM;  [facility];  Service: Podiatry    DE AMPUTATION FOOT TRANSMETARSAL Right 2/22/2021    Procedure: AMPUTATION TRANSMETATARSAL (TMA), REMOVAL NAIL IM T2 ICF HARDWARE;  Surgeon: Dennise Sparks DPM;  [facility];  Service: Podiatry    DE ARTHRD MIDTARSL/TARS MLT/TRANSVRS W/OSTEOT Right 2/12/2021    Procedure: foot ARTHRODESIS/FUSION;  Surgeon: Dennise Sparks DPM;  [facility];  Service: Podiatry    DE DEBRIDEMENT BONE MUSCLE &/FASCIA 20 SQ CM/< Right 12/2/2021    Procedure: DEBRIDEMENT OF TARSAL BONES WITH Robbie Jade;  Surgeon: Dennise Sparks DPM;  [facility];  Service: Podiatry    DE LNGTH/SHRT TENDON LEG/ANKLE 1 TENDON SPX Right 2/12/2021    Procedure: LENGTHEN TIBIAL TENDON, TRANS HEEL CORD;  Surgeon: Dennise Sparks DPM;  [facility];  Service: Podiatry    DE MUSC MYOCUTANEOUS/FASCIOCUTANEOUS FLAP TRUNK Right 4/12/2021    Procedure: RECONSTRUCTION MICROSURGICAL W/ FREE FLAP;  Surgeon: Harlan Buckner MD;  [facility];  Service: Plastics    DE MUSC MYOCUTANEOUS/FASCIOCUTANEOUS FLAP TRUNK Right 4/12/2021    Procedure: TAKEBACK RECONSTRUCTION MICROSURGICAL W/ FREE FLAP;  Surgeon: Harlan Buckner MD;  [facility];  Service: Plastics    DE MUSC MYOCUTANEOUS/FASCIOCUTANEOUS FLAP TRUNK Right 4/13/2021 Procedure: RECONSTRUCTION MICROSURGICAL W/ FREE FLAP, RE EXPLORATION, MICRO VASCULAR REVISION;  Surgeon: Naty Mir MD;  [facility];  Service: Plastics    NM OSTC PRTL EXOSTC/CONDYLC METAR HEAD Right 2020    Procedure: EXCISION EXOSTOSIS;  Surgeon: Yordy Moore DPM;  [facility];  Service: Podiatry    NM SECONDARY CLOSURE SURG WOUND/DEHSN EXTSV/COMPLIC Right     Procedure: PRIMARY DELAYED CLOSURE;  Surgeon: Yordy Moore DPM;  [facility];  Service: Podiatry    TOE AMPUTATION Right     partial great toe    TONSILLECTOMY      VAC DRESSING APPLICATION Right     Procedure: APPLICATION VAC DRESSING EXTREMITY;  Surgeon: Naty Mir MD;  [facility];  Service: [address] DEBRIDEMENT Right 3/1/2021    Procedure: DEBRIDEMENT LOWER EXTREMITY [facility] OUT); Surgeon: Naty Mir MD;  [facility];  Service: Plastics    WOUND DEBRIDEMENT Right 2021    Procedure: DEBRIDEMENT RIGHT FOOT;  Surgeon: Naty Mir MD;  [facility];  Service: Plastics    WOUND DEBRIDEMENT Right 2021    Procedure: DEBRIDEMENT LOWER EXTREMITY [facility] OUT);   Surgeon: Naty Mir MD;  [facility];  Service: Plastics    WOUND DEBRIDEMENT Right 2021    Procedure: DEBRIDEMENT LOWER EXTREMITY [facility] OUT): RIGHT FOOT;  Surgeon: Naty Mir MD;  [facility];  Service: Plastics     SOCIAL & FAMILY HISTORY     Social History     Substance and Sexual Activity   Alcohol Use Yes    Comment: occasionally       Social History     Substance and Sexual Activity   Drug Use Not Currently     Social History     Tobacco Use   Smoking Status Former    Types: Cigarettes    Quit date: 1988    Years since quittin.9   Smokeless Tobacco Former   Tobacco Comments    exposure to passive smoke     Family History   Problem Relation Age of Onset    Diabetes Father         mellitus     LABORATORY DATA     Labs: I have personally reviewed pertinent reports. Results from last 7 days   Lab Units 10/24/23  1137   WBC Thousand/uL 15.08*   HEMOGLOBIN g/dL 14.7   HEMATOCRIT % 44.8   PLATELETS Thousands/uL 224   NEUTROS PCT % 84*   MONOS PCT % 7   EOS PCT % 1      Results from last 7 days   Lab Units 10/24/23  1137   POTASSIUM mmol/L 4.4   CHLORIDE mmol/L 104   CO2 mmol/L 24   BUN mg/dL 19   CREATININE mg/dL 0.94   CALCIUM mg/dL 9.1   ALK PHOS U/L 89   ALT U/L 17   AST U/L 13              Results from last 7 days   Lab Units 10/24/23  1137   INR  1.37*   PTT seconds 33     Results from last 7 days   Lab Units 10/24/23  1137   LACTIC ACID mmol/L 1.0         Micro:  Lab Results   Component Value Date    BLOODCX No Growth After 5 Days. 02/16/2021    BLOODCX No Growth After 5 Days. 02/16/2021    BLOODCX No Growth After 5 Days. 11/06/2018    BLOODCX No Growth After 5 Days. 11/06/2018    URINECX >100,000 cfu/ml Staphylococcus coagulase negative (A) 05/02/2022    URINECX No Growth <1000 cfu/mL 11/13/2019    URINECX >100,000 cfu/ml Escherichia coli (A) 10/25/2019    WOUNDCULT 1+ Growth of Klebsiella pneumoniae ESBL (A) 12/02/2021    WOUNDCULT 2+ Growth of Staphylococcus aureus (A) 12/02/2021    WOUNDCULT 2+ Growth of Enterobacter cloacae complex (A) 04/22/2021    WOUNDCULT 2+ Growth of 04/22/2021     IMAGING & DIAGNOSTIC TESTS     Imaging: I have personally reviewed pertinent reports. No results found. EKG, Pathology, and Other Studies: I have personally reviewed pertinent reports. ALLERGIES     Allergies   Allergen Reactions    Simvastatin Myalgia    Itraconazole Other (See Comments)     Pt denies knowledge of allergy. Brena Andreina MEDICATIONS PRIOR TO ARRIVAL     Prior to Admission medications    Medication Sig Start Date End Date Taking?  Authorizing Provider   Accu-Chek Guide test strip TEST BLOOD SUGARS once a day 4/4/23   Ayad Medley MD   acetaminophen (Mapap Arthritis Pain) 650 mg CR tablet Take 1 tablet (650 mg total) by mouth 2 (two) times a day 5/18/21   KIRIT Castro   atorvastatin (LIPITOR) 40 mg tablet Take 1 tablet (40 mg total) by mouth daily 12/29/22   Andrew Quinn MD   Clenpiq 10-3.5-12 MG-GM -GM/160ML SOLN  6/15/22   Yola Stafford MD   diltiazem (CARDIZEM) 90 mg tablet Take 1 tablet (90 mg total) by mouth 2 (two) times a day 12/29/22   Andrew Quinn MD   Eliquis 5 MG Take 1 tablet (5 mg total) by mouth 2 (two) times a day 12/29/22   Andrew Quinn MD   glucose blood (OneTouch Ultra) test strip Use as instructed 3/8/22   Andrew Quinn MD   glucose blood (OneTouch Verio) test strip Use 1 each daily Use as instructed 3/8/22   Andrew Quinn MD   Insulin Glargine Solostar (Lantus SoloStar) 100 UNIT/ML SOPN Start with 15 units daily ; increase s directed 9/4/23   Andrew Quinn MD   Lancets (onetouch ultrasoft) lancets Use as instructed 3/8/22   Andrew Quinn MD   Lancets (onetouch ultrasoft) lancets Use daily 7/31/23   Andrew Quinn MD   lisinopril (ZESTRIL) 40 mg tablet Take 1 tablet (40 mg total) by mouth daily 12/29/22   Andrew Quinn MD   metFORMIN (GLUCOPHAGE) 500 mg tablet Take 1 tablet (500 mg total) by mouth 2 (two) times a day with meals 12/29/22   Andrew Quinn MD   metoprolol succinate (TOPROL-XL) 25 mg 24 hr tablet TAKE (3) TABLETS DAILY BY MOUTH BY MOUTH TWICE A DAY 12/29/22   Andrew Quinn MD   tamsulosin (FLOMAX) 0.4 mg TAKE 1 CAPSULE (0.4 MG TOTAL) BY MOUTH DAILY WITH DINNER 10/16/23   Andrew Quinn MD     MEDICATIONS ADMINISTERED IN LAST 24 HOURS     Medication Administration - last 24 hours from 10/23/2023 1538 to 10/24/2023 1538         Date/Time Order Dose Route Action Action by     10/24/2023 1215 EDT cefepime (MAXIPIME) 2 g/50 mL dextrose IVPB 0 mg Intravenous Stopped Chari Knapp RN     10/24/2023 1143 EDT cefepime (MAXIPIME) 2 g/50 mL dextrose IVPB 2,000 mg Intravenous New Gregg Mcclellan RN     10/24/2023 1418 EDT vancomycin (VANCOCIN) 1500 mg in sodium chloride 0.9% 250 mL IVPB 0 mg Intravenous Stopped Jacquelyn MarmolejoRAVEN norris     10/24/2023 1216 EDT vancomycin (VANCOCIN) 1500 mg in sodium chloride 0.9% 250 mL IVPB 1,500 mg Intravenous [address]  [facility]     10/24/2023 1143 EDT acetaminophen (TYLENOL) tablet 975 mg 975 mg Oral Given Gulshan Swanson RN     10/24/2023 1418 EDT sodium chloride 0.9 % bolus 1,000 mL 0 mL Intravenous Stopped Gulshan Swanson RN     10/24/2023 1130 EDT sodium chloride 0.9 % bolus 1,000 mL 1,000 mL Intravenous New Bag Leslye Shultz RN          CURRENT MEDICATIONS     Current Facility-Administered Medications   Medication Dose Route Frequency Provider Last Rate    acetaminophen  975 mg Oral Q6H PRN Ronnell Johnson MD      apixaban  5 mg Oral BID Ronnell Johnson MD      [START ON 10/25/2023] atorvastatin  40 mg Oral Daily Ronnell Johnosn MD      cefepime  2,000 mg Intravenous Bekah Banda MD      diltiazem  90 mg Oral BID Ronnell Johnson MD      insulin lispro  1-6 Units Subcutaneous TID [facility] Ronnell Johnson MD      insulin lispro  1-6 Units Subcutaneous HS Ronnell Johnson MD      [START ON 10/25/2023] lisinopril  40 mg Oral Daily Ronnell Johnson MD      metoprolol succinate  75 mg Oral BID Ronnell Johnson MD      polyethylene glycol  17 g Oral Daily PRN Ronnell Johnson MD      senna  1 tablet Oral Daily Ronnell Johnson MD      tamsulosin  0.4 mg Oral Daily With Isabel Diaz MD      tetanus-diphtheria toxoids  0.5 mL Intramuscular Once Ronnell Johnson MD      vancomycin  1,000 mg Intravenous Q12H Ronnell Johnson MD          acetaminophen, 975 mg, Q6H PRN  polyethylene glycol, 17 g, Daily PRN      Admission Time  I spent 1 hour admitting the patient.   This involved direct patient contact where I performed a full history and physical, reviewing previous records, and reviewing laboratory and other diagnostic studies. Portions of the record may have been created with voice recognition software. Occasional wrong word or "sound a like" substitutions may have occurred due to the inherent limitations of voice recognition software.   Read the chart carefully and recognize, using context, where substitutions have occurred.    ==  Zurdo Estrella  Internal Medicine Residency PGY-II     Note written with assistance from:  Jonnathan Morillota

## 2023-10-24 NOTE — PROGRESS NOTES
Lluvia Savage. is a 72 y.o. male who is currently ordered Vancomycin IV with management by the Pharmacy Consult service. Relevant clinical data and objective / subjective history reviewed. Vancomycin Assessment:  Indication and Goal AUC/Trough: Soft tissue (goal -600, trough >10)  Clinical Status: stable  Micro:     Renal Function:  SCr: 0.94 mg/dL  CrCl: 88.5 mL/min  Renal replacement: Not on dialysis  Days of Therapy: 1  Current Dose: 1500 mg IV x1  Vancomycin Plan:  New Dosin mg IV Q12H, starting new regimen when predicted level is ~10 mcg/mL  Estimated AUC: 484 mcg*hr/mL  Estimated Trough: 16 mcg/mL  Next Level: 10/25 with AM labs  Renal Function Monitoring: Daily BMP and [place] will continue to follow closely for s/sx of nephrotoxicity, infusion reactions and appropriateness of therapy. BMP and CBC will be ordered per protocol. We will continue to follow the patient’s culture results and clinical progress daily.     Marce Sibley, Pharmacist

## 2023-10-24 NOTE — ASSESSMENT & PLAN NOTE
EKG on arrival significant for "Afib RVR, incomplete RBBB, septal infarct (age undetermined). " Home regimen includes: Cardizem 90 mg BID, Eliquis 5 mg BID, Metoprolol 75 mg BID. Tachycardia partially 2/2 sepsis due to cellulitis. Rates remain improved following increases in metoprolol/Cardizem.     Plan:  -Continue home Eliquis 5 mg BID  -Continue Metoprolol to 100 mg BID, Cardizem 120 BID  -Follow up with PCP within 1 to 2 weeks of discharge to reassess

## 2023-10-24 NOTE — SEPSIS NOTE
Sepsis Note   Logan Hails. 72 y.o. male MRN: [id number]  Unit/Bed#: QCB Encounter: [id number]       Initial Sepsis Screening       [address] Name 10/24/23 1142                Is the patient's history suggestive of a new or worsening infection? Yes (Proceed)  -GJ        Suspected source of infection soft tissue  -GJ        Indicate SIRS criteria Hyperthemia > 38.3C (100.9F) OR Hypothermia <36C (96.8F); Tachycardia > 90 bpm  -GJ        Are two or more of the above signs & symptoms of infection both present and new to the patient? Yes (Proceed)  -GJ        Assess for evidence of organ dysfunction: Are any of the below criteria present within 6 hours of suspected infection and SIRS criteria that are NOT considered to be chronic conditions? --                  User Key  (r) = Recorded By, (t) = Taken By, (c) = Cosigned By      [facility] Name Provider Type    [address] [place] [address],  Resident                        There is no height or weight on file to calculate BMI.   Wt Readings from Last 1 Encounters:   07/25/23 103 kg (228 lb)        Ideal body weight: 86.8 kg (191 lb 5.7 oz)  Adjusted ideal body weight: 93.4 kg (206 lb 0.2 oz)

## 2023-10-24 NOTE — ASSESSMENT & PLAN NOTE
Patient presented to ED with 1-day of increased erythema, warmth, and pain in LLE in the setting of left foot wound. S/p I&D with Podiatry on 10/26 with delayed wound closure on 10/30. Patient continues to remain afebrile without overt signs of infection. Wound cultures growing S. Aureus and mixed skin david - other cultures (AFB, anaerobic, Bcx2) negative. Wound dressing changed today by Podiatry.     Plan:  -Tylenol PRN for fever, pain  -Outpatient follow-up with podiatry recommended on d/c within 1 week  -Plan for home PT/OT/VNA

## 2023-10-24 NOTE — ASSESSMENT & PLAN NOTE
Patient initially presented with symptoms of increased urination. Initial urinary workup suggestive of possible infection with UA but culture with 80K S.  Epidermitis, likely contaminant    Plan:  -Continue home Flomax 0.4 mg daily

## 2023-10-25 ENCOUNTER — APPOINTMENT (INPATIENT)
Dept: NON INVASIVE DIAGNOSTICS | Facility: HOSPITAL | Age: 73
DRG: 854 | End: 2023-10-25
Payer: COMMERCIAL

## 2023-10-25 ENCOUNTER — APPOINTMENT (OUTPATIENT)
Dept: RADIOLOGY | Facility: HOSPITAL | Age: 73
DRG: 854 | End: 2023-10-25
Payer: COMMERCIAL

## 2023-10-25 LAB
ALBUMIN SERPL BCP-MCNC: 4.1 G/DL (ref 3.5–5)
ALP SERPL-CCNC: 85 U/L (ref 34–104)
ALT SERPL W P-5'-P-CCNC: 20 U/L (ref 7–52)
ANION GAP SERPL CALCULATED.3IONS-SCNC: 7 MMOL/L
AST SERPL W P-5'-P-CCNC: 14 U/L (ref 13–39)
BASOPHILS # BLD AUTO: 0.05 THOUSANDS/ÂΜL (ref 0–0.1)
BASOPHILS NFR BLD AUTO: 0 % (ref 0–1)
BILIRUB SERPL-MCNC: 1.54 MG/DL (ref 0.2–1)
BUN SERPL-MCNC: 14 MG/DL (ref 5–25)
CALCIUM SERPL-MCNC: 9 MG/DL (ref 8.4–10.2)
CHLORIDE SERPL-SCNC: 105 MMOL/L (ref 96–108)
CO2 SERPL-SCNC: 25 MMOL/L (ref 21–32)
CREAT SERPL-MCNC: 0.92 MG/DL (ref 0.6–1.3)
EOSINOPHIL # BLD AUTO: 0.11 THOUSAND/ÂΜL (ref 0–0.61)
EOSINOPHIL NFR BLD AUTO: 1 % (ref 0–6)
ERYTHROCYTE [DISTWIDTH] IN BLOOD BY AUTOMATED COUNT: 13.9 % (ref 11.6–15.1)
EST. AVERAGE GLUCOSE BLD GHB EST-MCNC: 235 MG/DL
GFR SERPL CREATININE-BSD FRML MDRD: 83 ML/MIN/1.73SQ M
GLUCOSE SERPL-MCNC: 178 MG/DL (ref 65–140)
GLUCOSE SERPL-MCNC: 181 MG/DL (ref 65–140)
GLUCOSE SERPL-MCNC: 182 MG/DL (ref 65–140)
GLUCOSE SERPL-MCNC: 206 MG/DL (ref 65–140)
GLUCOSE SERPL-MCNC: 217 MG/DL (ref 65–140)
GLUCOSE SERPL-MCNC: 222 MG/DL (ref 65–140)
HBA1C MFR BLD: 9.8 %
HCT VFR BLD AUTO: 47.7 % (ref 36.5–49.3)
HGB BLD-MCNC: 15.2 G/DL (ref 12–17)
IMM GRANULOCYTES # BLD AUTO: 0.15 THOUSAND/UL (ref 0–0.2)
IMM GRANULOCYTES NFR BLD AUTO: 1 % (ref 0–2)
LYMPHOCYTES # BLD AUTO: 1.76 THOUSANDS/ÂΜL (ref 0.6–4.47)
LYMPHOCYTES NFR BLD AUTO: 8 % (ref 14–44)
MCH RBC QN AUTO: 31.4 PG (ref 26.8–34.3)
MCHC RBC AUTO-ENTMCNC: 31.9 G/DL (ref 31.4–37.4)
MCV RBC AUTO: 99 FL (ref 82–98)
MONOCYTES # BLD AUTO: 1.47 THOUSAND/ÂΜL (ref 0.17–1.22)
MONOCYTES NFR BLD AUTO: 7 % (ref 4–12)
NEUTROPHILS # BLD AUTO: 18.27 THOUSANDS/ÂΜL (ref 1.85–7.62)
NEUTS SEG NFR BLD AUTO: 83 % (ref 43–75)
NRBC BLD AUTO-RTO: 0 /100 WBCS
PLATELET # BLD AUTO: 204 THOUSANDS/UL (ref 149–390)
PMV BLD AUTO: 11 FL (ref 8.9–12.7)
POTASSIUM SERPL-SCNC: 4.1 MMOL/L (ref 3.5–5.3)
PROT SERPL-MCNC: 7.2 G/DL (ref 6.4–8.4)
RBC # BLD AUTO: 4.84 MILLION/UL (ref 3.88–5.62)
SODIUM SERPL-SCNC: 137 MMOL/L (ref 135–147)
VANCOMYCIN SERPL-MCNC: 7 UG/ML (ref 10–20)
WBC # BLD AUTO: 21.81 THOUSAND/UL (ref 4.31–10.16)

## 2023-10-25 PROCEDURE — 87186 SC STD MICRODIL/AGAR DIL: CPT

## 2023-10-25 PROCEDURE — 87205 SMEAR GRAM STAIN: CPT

## 2023-10-25 PROCEDURE — 93923 UPR/LXTR ART STDY 3+ LVLS: CPT

## 2023-10-25 PROCEDURE — 99223 1ST HOSP IP/OBS HIGH 75: CPT | Performed by: PHYSICIAN ASSISTANT

## 2023-10-25 PROCEDURE — 83036 HEMOGLOBIN GLYCOSYLATED A1C: CPT

## 2023-10-25 PROCEDURE — 80202 ASSAY OF VANCOMYCIN: CPT

## 2023-10-25 PROCEDURE — 73720 MRI LWR EXTREMITY W/O&W/DYE: CPT

## 2023-10-25 PROCEDURE — 97166 OT EVAL MOD COMPLEX 45 MIN: CPT

## 2023-10-25 PROCEDURE — 87075 CULTR BACTERIA EXCEPT BLOOD: CPT

## 2023-10-25 PROCEDURE — A9585 GADOBUTROL INJECTION: HCPCS

## 2023-10-25 PROCEDURE — 87147 CULTURE TYPE IMMUNOLOGIC: CPT

## 2023-10-25 PROCEDURE — NC001 PR NO CHARGE: Performed by: INTERNAL MEDICINE

## 2023-10-25 PROCEDURE — 05HD33Z INSERTION OF INFUSION DEVICE INTO RIGHT CEPHALIC VEIN, PERCUTANEOUS APPROACH: ICD-10-PCS | Performed by: INTERNAL MEDICINE

## 2023-10-25 PROCEDURE — 87081 CULTURE SCREEN ONLY: CPT

## 2023-10-25 PROCEDURE — 80053 COMPREHEN METABOLIC PANEL: CPT

## 2023-10-25 PROCEDURE — 93925 LOWER EXTREMITY STUDY: CPT

## 2023-10-25 PROCEDURE — 99223 1ST HOSP IP/OBS HIGH 75: CPT | Performed by: PODIATRIST

## 2023-10-25 PROCEDURE — 87070 CULTURE OTHR SPECIMN AEROBIC: CPT

## 2023-10-25 PROCEDURE — 11043 DBRDMT MUSC&/FSCA 1ST 20/<: CPT | Performed by: PODIATRIST

## 2023-10-25 PROCEDURE — 82948 REAGENT STRIP/BLOOD GLUCOSE: CPT

## 2023-10-25 PROCEDURE — 85025 COMPLETE CBC W/AUTO DIFF WBC: CPT

## 2023-10-25 PROCEDURE — 99223 1ST HOSP IP/OBS HIGH 75: CPT | Performed by: INTERNAL MEDICINE

## 2023-10-25 PROCEDURE — 97163 PT EVAL HIGH COMPLEX 45 MIN: CPT

## 2023-10-25 RX ORDER — GADOBUTROL 604.72 MG/ML
10 INJECTION INTRAVENOUS
Status: COMPLETED | OUTPATIENT
Start: 2023-10-25 | End: 2023-10-25

## 2023-10-25 RX ORDER — METOPROLOL SUCCINATE 100 MG/1
100 TABLET, EXTENDED RELEASE ORAL 2 TIMES DAILY
Status: DISCONTINUED | OUTPATIENT
Start: 2023-10-25 | End: 2023-11-01 | Stop reason: HOSPADM

## 2023-10-25 RX ADMIN — ATORVASTATIN CALCIUM 40 MG: 40 TABLET, FILM COATED ORAL at 08:55

## 2023-10-25 RX ADMIN — METOPROLOL SUCCINATE 100 MG: 100 TABLET, EXTENDED RELEASE ORAL at 22:40

## 2023-10-25 RX ADMIN — DILTIAZEM HYDROCHLORIDE 90 MG: 60 TABLET, FILM COATED ORAL at 10:40

## 2023-10-25 RX ADMIN — ACETAMINOPHEN 975 MG: 325 TABLET, FILM COATED ORAL at 22:35

## 2023-10-25 RX ADMIN — VANCOMYCIN HYDROCHLORIDE 1000 MG: 1 INJECTION, SOLUTION INTRAVENOUS at 18:27

## 2023-10-25 RX ADMIN — ACETAMINOPHEN 975 MG: 325 TABLET, FILM COATED ORAL at 14:04

## 2023-10-25 RX ADMIN — CEFEPIME 2000 MG: 2 INJECTION, POWDER, FOR SOLUTION INTRAVENOUS at 06:27

## 2023-10-25 RX ADMIN — METOPROLOL SUCCINATE 75 MG: 50 TABLET, EXTENDED RELEASE ORAL at 08:55

## 2023-10-25 RX ADMIN — ACETAMINOPHEN 975 MG: 325 TABLET, FILM COATED ORAL at 02:16

## 2023-10-25 RX ADMIN — LISINOPRIL 40 MG: 20 TABLET ORAL at 08:55

## 2023-10-25 RX ADMIN — APIXABAN 5 MG: 5 TABLET, FILM COATED ORAL at 08:55

## 2023-10-25 RX ADMIN — APIXABAN 5 MG: 5 TABLET, FILM COATED ORAL at 17:08

## 2023-10-25 RX ADMIN — INSULIN LISPRO 1 UNITS: 100 INJECTION, SOLUTION INTRAVENOUS; SUBCUTANEOUS at 08:56

## 2023-10-25 RX ADMIN — INSULIN LISPRO 2 UNITS: 100 INJECTION, SOLUTION INTRAVENOUS; SUBCUTANEOUS at 22:42

## 2023-10-25 RX ADMIN — INSULIN LISPRO 2 UNITS: 100 INJECTION, SOLUTION INTRAVENOUS; SUBCUTANEOUS at 11:34

## 2023-10-25 RX ADMIN — GADOBUTROL 10 ML: 604.72 INJECTION INTRAVENOUS at 22:06

## 2023-10-25 RX ADMIN — DILTIAZEM HYDROCHLORIDE 90 MG: 60 TABLET, FILM COATED ORAL at 22:41

## 2023-10-25 RX ADMIN — TAMSULOSIN HYDROCHLORIDE 0.4 MG: 0.4 CAPSULE ORAL at 17:08

## 2023-10-25 RX ADMIN — INSULIN LISPRO 2 UNITS: 100 INJECTION, SOLUTION INTRAVENOUS; SUBCUTANEOUS at 17:08

## 2023-10-25 RX ADMIN — VANCOMYCIN HYDROCHLORIDE 1000 MG: 1 INJECTION, SOLUTION INTRAVENOUS at 07:05

## 2023-10-25 NOTE — PLAN OF CARE
Problem: PHYSICAL THERAPY ADULT  Goal: Performs mobility at highest level of function for planned discharge setting. See evaluation for individualized goals. Description: Treatment/Interventions: OT, Spoke to nursing, Gait training  Equipment Recommended: Devika Cr       See flowsheet documentation for full assessment, interventions and recommendations. Note: Prognosis: Fair  Problem List: Decreased endurance, Decreased mobility, Pain  Assessment: Pt is a 72 y.o. male seen for PT evaluation s/p admit to [address] [place] [address] on 10/24/2023. Pt was admitted with a primary dx of: Cellulitis L LE, Sepsis, A-fib with RVR. PMH of  previous R TMA, BPH ,IDDM with neuropathy, HTN, HLD, A Fib  PT now consulted for assessment of mobility and d/c needs. Pt with Up and OOB as tolerated  orders. Pts current clinical presentation is Unstable/ Unpredictable (high complexity) due to Ongoing medical management for primary dx, Decreased activity tolerance compared to baseline, Fall risk, Ongoing telemetry monitoring. Prior to admission, pt was Ind for mobility and ADLs, no AD used at baseline, but does have SPC and wc at home. Pt lives with spouse who is supportive. [place] Upon evaluation, pt currently is Ind for bed mobility and transfers , ambulates in hallway with supervision , limited 2* fatigue, and increased HR. Pt presents at PT eval functioning below baseline and currently w/ overall mobility deficits 2* to: decreased endurance, pain, decreased activity tolerance compared to baseline, fall risk. Pt currently at a fall risk 2* to impairments listed above. Pt will continue to benefit from skilled acute PT intervention to maximize functional mobility and DME needs. At conclusion of PT session pt returned back in chair, all needs in reach, and RN notified of session findings/recommendations with phone and call bell within reach. Pt denies any further questions at this time.  The patient's AM-PAC Basic Mobility Inpatient Short Form Raw Score is 21. A Raw score of greater than 16 suggests the patient may benefit from discharge to home. Please also refer to the recommendation of the Physical Therapist for safe discharge planning. Recommend Home, no PT services upon hospital D/C. PT Discharge Recommendation: No rehabilitation needs    See flowsheet documentation for full assessment.

## 2023-10-25 NOTE — OCCUPATIONAL THERAPY NOTE
Occupational Therapy Evaluation     Patient Name: Олег Tabares   Today's Date: 10/25/2023  Problem List  Principal Problem:    Cellulitis of left lower extremity  Active Problems:    Hyperlipidemia    Type 2 diabetes mellitus with diabetic neuropathy (HCC)    Atrial fibrillation with RVR ([address])    Essential hypertension    Benign prostatic hyperplasia with lower urinary tract symptoms    Sepsis ([address])    S/P transmetatarsal amputation of foot, right [facility])    Past Medical History  Past Medical History:   Diagnosis Date    Arthritis     Atrial fibrillation ([address])     Diagnosed 11/2018    Benign prostate hyperplasia 04/2002    Cellulitis     right lower leg     Colon polyp 2006    Diabetes mellitus ([address])     Hearing aid worn     bilat    Hearing loss     90% loss left ear and 40% right ear    History of clubfoot     "since birth"    History of pneumonia     History of TIA (transient ischemic attack) 04/25/2017 11/30/18 pt denies    Hyperlipidemia 5/15/2014    Hypertension     Infectious viral hepatitis     "cant remember type"    Irregular heart beat     Afib    Neuropathy     both feet    Osteomyelitis ([address])     right great toe    Pneumonia     Right middle lobe pulmonary nodule 11/6/2018    Seasickness     Teeth missing     Type 2 diabetes mellitus with diabetic neuropathy ([address]) 11/6/2018    Wears glasses     reading    Wound, open     bottom of right foot     Past Surgical History  Past Surgical History:   Procedure Laterality Date    BUNIONECTOMY Right 12/4/2018    Procedure: 5TH METATARSAL BONE PARTIAL RESECTION, FULL THICKNESS DEBRIDEMENT OF DIABETIC ULCER;  Surgeon: Gm Pearce DPM;  [facility];  Service: Podiatry    CLUB FOOT RELEASE Bilateral     COLONOSCOPY      COMPLEX WOUND CLOSURE TO EXTREMITY  4/27/2021    Procedure: COMPLEX WOUND CLOSURE TO EXTREMITY: RIGHT FOOT;  Surgeon: Vonnie Valencia MD;  [facility];  Service: Plastics    EXTERNAL FIXATOR APPLICATION Right 2/12/2021    Procedure: Application multiplane external fixation;  Surgeon: Eyal Bedolla DPM;  [facility];  Service: Podiatry    FOOT HARDWARE REMOVAL Right 2/17/2021    Procedure: REMOVAL EXTERNAL FIXATOR;  Surgeon: Eyal Bedolla DPM;  [facility];  Service: Podiatry    FOREIGN BODY REMOVAL Right 4/27/2021    Procedure: REMOVAL FOREIGN BODY EXTREMITY: RIGHT FOOT;  Surgeon: Sintia Gonzalez MD;  [facility];  Service: Plastics    INCISION AND DRAINAGE OF WOUND Right 2/17/2021    Procedure: INCISION AND DRAINAGE (I&D) EXTREMITY;  Surgeon: Eyal Bedolla DPM;  [facility];  Service: Podiatry    ORIF FOOT FRACTURE Right 3/4/2021    Procedure: VAC PLACEMENT; SCREW FIXATION RIGHT FOOT FUSION;  Surgeon: Eyal Bedolla DPM;  [facility];  Service: Podiatry    WA AMPUTATION FOOT TRANSMETARSAL Right 2/22/2021    Procedure: AMPUTATION TRANSMETATARSAL (TMA), REMOVAL NAIL IM T2 ICF HARDWARE;  Surgeon: Eyal Bedolla DPM;  [facility];  Service: Podiatry    WA ARTHRD MIDTARSL/TARS MLT/TRANSVRS W/OSTEOT Right 2/12/2021    Procedure: foot ARTHRODESIS/FUSION;  Surgeon: Eyal Bedolla DPM;  [facility];  Service: Podiatry    WA DEBRIDEMENT BONE MUSCLE &/FASCIA 20 SQ CM/< Right 12/2/2021    Procedure: DEBRIDEMENT OF TARSAL BONES WITH Mila Miranda;  Surgeon: Eyal Bedolla DPM;  [facility];  Service: Podiatry    WA LNGTH/SHRT TENDON LEG/ANKLE 1 TENDON SPX Right 2/12/2021    Procedure: LENGTHEN TIBIAL TENDON, TRANS HEEL CORD;  Surgeon: Eyal Bedolla DPM;  [facility];  Service: Podiatry    WA MUSC MYOCUTANEOUS/FASCIOCUTANEOUS FLAP TRUNK Right 4/12/2021    Procedure: RECONSTRUCTION MICROSURGICAL W/ FREE FLAP;  Surgeon: Sintia Gonzalez MD;  [facility];  Service: Plastics    WA MUSC MYOCUTANEOUS/FASCIOCUTANEOUS FLAP TRUNK Right 4/12/2021    Procedure: TAKEBACK RECONSTRUCTION MICROSURGICAL W/ FREE FLAP; Surgeon: Eliel Luz MD;  [facility];  Service: Plastics    NC MUSC MYOCUTANEOUS/FASCIOCUTANEOUS FLAP TRUNK Right 4/13/2021    Procedure: RECONSTRUCTION MICROSURGICAL W/ FREE FLAP, RE EXPLORATION, MICRO VASCULAR REVISION;  Surgeon: Eliel Luz MD;  [facility];  Service: Plastics    NC OSTC PRTL EXOSTC/CONDYLC METAR HEAD Right 12/29/2020    Procedure: EXCISION EXOSTOSIS;  Surgeon: Debbie Conner DPM;  [facility];  Service: Podiatry    NC SECONDARY CLOSURE SURG WOUND/DEHSN EXTSV/COMPLIC Right 04/36/0996    Procedure: PRIMARY DELAYED CLOSURE;  Surgeon: Debbie Conner DPM;  [facility];  Service: Podiatry    TOE AMPUTATION Right     partial great toe    TONSILLECTOMY      VAC DRESSING APPLICATION Right 7/4/5585    Procedure: APPLICATION VAC DRESSING EXTREMITY;  Surgeon: Eliel Luz MD;  [facility];  Service: [address] DEBRIDEMENT Right 3/1/2021    Procedure: DEBRIDEMENT LOWER EXTREMITY [facility] OUT); Surgeon: Eliel Luz MD;  [facility];  Service: Plastics    WOUND DEBRIDEMENT Right 4/7/2021    Procedure: DEBRIDEMENT RIGHT FOOT;  Surgeon: Eliel Luz MD;  [facility];  Service: Plastics    WOUND DEBRIDEMENT Right 4/12/2021    Procedure: DEBRIDEMENT LOWER EXTREMITY [facility] OUT); Surgeon: Eliel Luz MD;  [facility];  Service: Plastics    WOUND DEBRIDEMENT Right 4/27/2021    Procedure: DEBRIDEMENT LOWER EXTREMITY [facility] OUT): RIGHT FOOT;  Surgeon: Eliel Luz MD;  [facility];  Service: Plastics           10/25/23 1025   OT Last Visit   OT Visit Date 10/25/23   Note Type   Note type Evaluation   Pain Assessment   Pain Assessment Tool 0-10   Pain Score 3   Pain Location/Orientation Orientation: Left; Location: Leg   Restrictions/Precautions   Weight Bearing Precautions Per Order No   Braces or Orthoses   (B/L custom boots, h/o of R trans met amp)   Other Precautions Fall Risk;Pain;Multiple lines;Telemetry   Home Living   Type of [address] [place] [address]  One level;Ramped entrance   Bathroom Shower/Tub Walk-in shower  (+TUB)   Bathroom Toilet Standard   Bathroom Equipment Grab bars in shower;Commode   [address]; Wheelchair-manual;Other (Comment)  (lift chair)   Additional Comments Pt reside in a [address] with 4 JAMEY. Prior Function   Level of [place] Independent with ADLs; Independent with functional mobility; Independent with IADLS   Lives With Spouse   Receives Help From Family   IADLs Independent with driving; Independent with medication management; Family/Friend/Other provides meals   Falls in the last 6 months 0   Vocational Retired   Comments PTA, Pt reports being I with ADLs/splits IADL tasks with wife/no AD/ +. Pt with supportive spouse able to assist.   Lifestyle   Autonomy I with ADLs/splits IADL tasks with wife/no AD/ +   Reciprocal Relationships Supportive spouse   Service to Others Retired   [address] [facility] Enjoys golfing   ADL   Where Assessed Edge of bed   Eating Assistance 7  Independent   Grooming Assistance 7  [address] Deficit Don/doff L sock; Don/doff R sock   Toileting Assistance  7  Independent   Toileting Deficit Setup; Increased time to complete;Supervison/safety   Functional Assistance 5  Supervision/Setup   Bed Mobility   Supine to Sit 7  Independent   Sit to Supine Unable to assess   Additional Comments Pt greeted supine in bed.    Transfers   Sit to Stand 7  Independent   Stand to Sit 7  Independent   Additional Comments no AD   Functional Mobility   Functional Mobility 5  Supervision   Additional Comments S with functional mobility with no AD- household distances   Balance   Static Sitting Fair +   Dynamic Sitting Fair   Static Standing Fair   Dynamic Standing Fair -   Ambulatory Fair -   Activity Tolerance   Activity Tolerance Patient tolerated treatment well;Treatment limited secondary to medical complications (Comment)  (HR of 160s-170s with activity- returned to 100s at rest- RN aware)   Medical Staff Made Aware Co-eval with MEL Pringle 2* to Pt's medical complexity and decreased endurance. Nurse Made Aware RN cleared/updated. RUE Assessment   RUE Assessment WFL   LUE Assessment   LUE Assessment WFL   Hand Function   Gross Motor Coordination Functional   Fine Motor Coordination Functional   Psychosocial   Psychosocial (WDL) WDL   Cognition   Overall Cognitive Status WFL   Arousal/Participation Cooperative; Alert   Attention Within functional limits   Orientation Level Oriented X4   Memory Within functional limits   Following Commands Follows all commands and directions without difficulty   Comments Pt very pleasant and cooperative during OT session. Assessment   Limitation Decreased high-level ADLs; Decreased endurance   Prognosis Good   Assessment Pt is a 72 y.o. male who presented to \[facility]"" on 10/24/2023 with cellulitis of LLE. Pt with diagnosis of cellulitis of LLE, sepsis, and afib RVR. Pt  has a past medical history of Arthritis, Atrial fibrillation ([address]), Benign prostate hyperplasia (04/2002), Cellulitis, Colon polyp (2006), Diabetes mellitus ([address]), Hearing aid worn, Hearing loss, History of clubfoot, History of pneumonia, History of TIA (transient ischemic attack) (04/25/2017), Hyperlipidemia (5/15/2014), Hypertension, Infectious viral hepatitis, Irregular heart beat, Neuropathy, Osteomyelitis ([address]), Pneumonia, Right middle lobe pulmonary nodule (11/6/2018), Seasickness, Teeth missing, Type 2 diabetes mellitus with diabetic neuropathy ([address]) (11/6/2018), Wears glasses, and Wound, open. Pt greeted bedside for OT evaluation on 10/25/2023. Pt reside in a [address] with 4 JAMEY.  PTA, Pt reports being I with ADLs/splits IADL tasks with wife/no AD/ +. Pt with supportive spouse able to assist. Pt demonstrating the following occupational deficits: I with UB/LB ADLs, I with bed mobility, I with functional transfers, and S with functional mobility with no AD. Pt encouraged to participate in ADLs/functional mobility with nursing/restorative staff during hospitalization. Pt with no further acute OT concerns. Pt would benefit from returning home with increased social support upon DC to maximize safety and independence with ADLs and functional tasks of choice. DC skilled OT services. Goals   Patient Goals To go home. Plan   OT Frequency Eval only   Discharge Recommendation   OT Discharge Recommendation No rehabilitation needs   Additional Comments  The patient's raw score on the -PAC Daily Activity Inpatient Short Form is 24. A raw score of greater than or equal to 19 suggests the patient may benefit from discharge to home. Please refer to the recommendation of the Occupational Therapist for safe discharge planning. AM-PAC Daily Activity Inpatient   Lower Body Dressing 4   Bathing 4   Toileting 4   Upper Body Dressing 4   Grooming 4   Eating 4   Daily Activity Raw Score 24   Daily Activity Standardized Score (Calc for Raw Score >=11) 57.54   AM-PAC Applied Cognition Inpatient   Following a Speech/Presentation 4   Understanding Ordinary Conversation 4   Taking Medications 4   Remembering Where Things Are Placed or Put Away 4   Remembering List of 4-5 Errands 4   Taking Care of Complicated Tasks 4   Applied Cognition Raw Score 24   Applied Cognition Standardized Score 62.21   End of Consult   Education Provided Yes   Patient Position at End of Consult Bedside chair; All needs within reach   Nurse Communication Nurse aware of consult       Trenton Rico MS, OTR/L

## 2023-10-25 NOTE — PHYSICAL THERAPY NOTE
Physical Therapy Evaluation     Patient's Name: Shaylee Acosta.     Admitting Diagnosis  Leg pain [M79.606]  Cellulitis of left lower leg [L03.116]  Sepsis ([address]) [A41.9]    Problem List  Patient Active Problem List   Diagnosis    Hyperlipidemia    Type 2 diabetes mellitus with diabetic neuropathy (HCC)    Atrial fibrillation with RVR ([address])    Right middle lobe pulmonary nodule    Essential hypertension    Benign prostatic hyperplasia with lower urinary tract symptoms    Microalbuminuria    Sepsis ([address])    Cellulitis of extremity    Aftercare for amputation stump    Ambulatory dysfunction    S/P transmetatarsal amputation of foot, right ([address])    Sleep disturbance    Anxiety    S/P R ALT flap to right foot    COVID-19    Gait abnormality    Vasomotor rhinitis    Corns and callosities    Tinea unguium    Cellulitis of left lower extremity       Past Medical History  Past Medical History:   Diagnosis Date    Arthritis     Atrial fibrillation ([address])     Diagnosed 11/2018    Benign prostate hyperplasia 04/2002    Cellulitis     right lower leg     Colon polyp 2006    Diabetes mellitus ([address])     Hearing aid worn     bilat    Hearing loss     90% loss left ear and 40% right ear    History of clubfoot     "since birth"    History of pneumonia     History of TIA (transient ischemic attack) 04/25/2017 11/30/18 pt denies    Hyperlipidemia 5/15/2014    Hypertension     Infectious viral hepatitis     "cant remember type"    Irregular heart beat     Afib    Neuropathy     both feet    Osteomyelitis ([address])     right great toe    Pneumonia     Right middle lobe pulmonary nodule 11/6/2018    Seasickness     Teeth missing     Type 2 diabetes mellitus with diabetic neuropathy ([address]) 11/6/2018    Wears glasses     reading    Wound, open     bottom of right foot       Past Surgical History  Past Surgical History:   Procedure Laterality Date    BUNIONECTOMY Right 12/4/2018    Procedure: 5TH METATARSAL BONE PARTIAL RESECTION, FULL THICKNESS DEBRIDEMENT OF DIABETIC ULCER;  Surgeon: Jaky Fenton DPM;  [facility];  Service: Podiatry    CLUB FOOT RELEASE Bilateral     COLONOSCOPY      COMPLEX WOUND CLOSURE TO EXTREMITY  4/27/2021    Procedure: COMPLEX WOUND CLOSURE TO EXTREMITY: RIGHT FOOT;  Surgeon: Sintia Gonzalez MD;  [facility];  Service: Plastics    EXTERNAL FIXATOR APPLICATION Right 3/79/4192    Procedure: Application multiplane external fixation;  Surgeon: Eyal Bedolla DPM;  [facility];  Service: Podiatry    FOOT HARDWARE REMOVAL Right 2/17/2021    Procedure: REMOVAL EXTERNAL FIXATOR;  Surgeon: Eyal Bedolla DPM;  [facility];  Service: Podiatry    FOREIGN BODY REMOVAL Right 4/27/2021    Procedure: REMOVAL FOREIGN BODY EXTREMITY: RIGHT FOOT;  Surgeon: Sintia Gonzalez MD;  [facility];  Service: Plastics    INCISION AND DRAINAGE OF WOUND Right 2/17/2021    Procedure: INCISION AND DRAINAGE (I&D) EXTREMITY;  Surgeon: Eyal Bedolla DPM;  [facility];  Service: Podiatry    ORIF FOOT FRACTURE Right 3/4/2021    Procedure: VAC PLACEMENT; SCREW FIXATION RIGHT FOOT FUSION;  Surgeon: Eyal Bedolla DPM;  [facility];  Service: Podiatry    NH AMPUTATION FOOT TRANSMETARSAL Right 2/22/2021    Procedure: AMPUTATION TRANSMETATARSAL (TMA), REMOVAL NAIL IM T2 ICF HARDWARE;  Surgeon: Eyal Bedolla DPM;  [facility];  Service: Podiatry    NH ARTHRD MIDTARSL/TARS MLT/TRANSVRS W/OSTEOT Right 2/12/2021    Procedure: foot ARTHRODESIS/FUSION;  Surgeon: Eyal Bedolla DPM;  [facility];  Service: Podiatry    NH DEBRIDEMENT BONE MUSCLE &/FASCIA 20 SQ CM/< Right 12/2/2021    Procedure: DEBRIDEMENT OF TARSAL BONES WITH Mila Miranda;  Surgeon: Eyal Bedolla DPM;  [facility];  Service: Podiatry    NH LNGTH/SHRT TENDON LEG/ANKLE 1 TENDON SPX Right 2/12/2021    Procedure: LENGTHEN TIBIAL TENDON, TRANS HEEL CORD;  Surgeon: Eyal Bedolla DONOVAN;  [facility];  Service: Podiatry    UT MUSC MYOCUTANEOUS/FASCIOCUTANEOUS FLAP TRUNK Right 4/12/2021    Procedure: RECONSTRUCTION MICROSURGICAL W/ FREE FLAP;  Surgeon: Janine Vu MD;  [facility];  Service: Plastics    UT MUSC MYOCUTANEOUS/FASCIOCUTANEOUS FLAP TRUNK Right 4/12/2021    Procedure: TAKEBACK RECONSTRUCTION MICROSURGICAL W/ FREE FLAP;  Surgeon: Janine Vu MD;  [facility];  Service: Plastics    UT MUSC MYOCUTANEOUS/FASCIOCUTANEOUS FLAP TRUNK Right 4/13/2021    Procedure: RECONSTRUCTION MICROSURGICAL W/ FREE FLAP, RE EXPLORATION, MICRO VASCULAR REVISION;  Surgeon: Janine Vu MD;  [facility];  Service: Plastics    UT OSTC PRTL EXOSTC/CONDYLC METAR HEAD Right 12/29/2020    Procedure: EXCISION EXOSTOSIS;  Surgeon: Domenica [place]aker DPM;  [facility];  Service: Podiatry    UT SECONDARY CLOSURE SURG WOUND/DEHSN EXTSV/COMPLIC Right 26/70/0996    Procedure: PRIMARY DELAYED CLOSURE;  Surgeon: Domenica [place]aker DPM;  [facility];  Service: Podiatry    TOE AMPUTATION Right     partial great toe    TONSILLECTOMY      VAC DRESSING APPLICATION Right 3/6/4916    Procedure: APPLICATION VAC DRESSING EXTREMITY;  Surgeon: Janine Vu MD;  [facility];  Service: [address] DEBRIDEMENT Right 3/1/2021    Procedure: DEBRIDEMENT LOWER EXTREMITY [facility] OUT); Surgeon: Janine Vu MD;  [facility];  Service: Plastics    WOUND DEBRIDEMENT Right 4/7/2021    Procedure: DEBRIDEMENT RIGHT FOOT;  Surgeon: Janine Vu MD;  [facility];  Service: Plastics    WOUND DEBRIDEMENT Right 4/12/2021    Procedure: DEBRIDEMENT LOWER EXTREMITY [facility] OUT);   Surgeon: Janine Vu MD;  [facility];  Service: Plastics    WOUND DEBRIDEMENT Right 4/27/2021    Procedure: DEBRIDEMENT LOWER EXTREMITY [facility] OUT): RIGHT FOOT;  Surgeon: Amira Brennan Mannie Garvey MD;  [facility];  Service: Plastics          10/25/23 1024   PT Last Visit   PT Visit Date 10/25/23   Note Type   Note type Evaluation   Pain Assessment   Pain Assessment Tool 0-10   Pain Score 3   Pain Location/Orientation Orientation: Left; Location: Leg   Effect of Pain on Daily Activities Limited activity   Patient's Stated Pain Goal No pain   Hospital Pain Intervention(s) Repositioned   Restrictions/Precautions   Weight Bearing Precautions Per Order No   Braces or Orthoses   (B/L Custom boots)   Other Precautions Fall Risk;Pain;Telemetry;Multiple lines   Home Living   Type of [address] [place] [address] One level;Ramped entrance  (4 JAMEY in front but Ramp access in back entrance tht pt uses)   Bathroom Shower/Tub Walk-in shower  (+ Tub shower)   [address] [facility] in [facility]; Wheelchair-manual  (Lift chair)   Additional Comments Pt lives in [facility] with Ramp access. Pt does not use AD at home, but has cane and states uses w/c at home at times   Prior Function   Level of [place] Independent with ADLs; Independent with functional mobility  (Shares IADLs with wife)   Lives With Spouse   Receives Help From Family   IADLs Independent with driving; Independent with medication management; Family/Friend/Other provides meals   Falls in the last 6 months 0   Vocational Retired   General   Family/Caregiver Present No   Cognition   Overall Cognitive Status WFL   Arousal/Participation Alert   Orientation Level Oriented X4   Following Commands Follows all commands and directions without difficulty   RLE Assessment   RLE Assessment WFL  (previous TMA)   LLE Assessment   LLE Assessment WFL   Bed Mobility   Supine to Sit 7  Independent   Sit to Supine Unable to assess   Additional Comments Pt noted to be in bed upon arrival   Transfers   Sit to Stand 7  Independent   Stand to Sit 7  Independent   Additional Comments no AD used   Ambulation/Elevation Gait pattern Improper Weight shift;Narrow YANNA; Antalgic   Gait Assistance 5  Supervision   Additional items Verbal cues   Assistive Device None   Distance 90 ft   Ambulation/Elevation Additional Comments Pt ambulates in hallway with above noted gait deviations. Pt has prior R TMA and pain in L LE at this time,may benefit from using of [facility] for additional support. Pt does have custom boots, but declines to wear them at this time. Limited distance 2*  tachycardia, HR 130s- 160s per nurse, as on monitir. Pt assisted to recliner, HR to 100-110. Balance   Static Sitting Fair +   Dynamic Sitting Fair   Static Standing Fair   Dynamic Standing Fair -   Ambulatory Fair -   Activity Tolerance   Activity Tolerance Patient tolerated treatment well;Treatment limited secondary to medical complications (Comment)  (Tachycardia)   Medical Staff Made Aware Co-evalw ith OT 2* medical complexity and multiple co morbidities   Nurse Made Aware RN cleared pt for therapy   Assessment   Prognosis Fair   Problem List Decreased endurance;Decreased mobility;Pain   Assessment Pt is a 72 y.o. male seen for PT evaluation s/p admit to [address] [place] [address] on 10/24/2023. Pt was admitted with a primary dx of: Cellulitis L LE, Sepsis, A-fib with RVR. PMH of  previous R TMA, BPH ,IDDM with neuropathy, HTN, HLD, A Fib  PT now consulted for assessment of mobility and d/c needs. Pt with Up and OOB as tolerated  orders. Pts current clinical presentation is Unstable/ Unpredictable (high complexity) due to Ongoing medical management for primary dx, Decreased activity tolerance compared to baseline, Fall risk, Ongoing telemetry monitoring. Prior to admission, pt was Ind for mobility and ADLs, no AD used at baseline, but does have SPC and wc at home. Pt lives with spouse who is supportive. Patricia Duvall Upon evaluation, pt currently is Ind for bed mobility and transfers , ambulates in hallway with supervision , limited 2* fatigue, and increased HR.  Pt presents at PT eval functioning below baseline and currently w/ overall mobility deficits 2* to: decreased endurance, pain, decreased activity tolerance compared to baseline, fall risk. Pt currently at a fall risk 2* to impairments listed above. Pt will continue to benefit from skilled acute PT intervention to maximize functional mobility and DME needs. At conclusion of PT session pt returned back in chair, all needs in reach, and RN notified of session findings/recommendations with phone and call bell within reach. Pt denies any further questions at this time. The patient's AM-PAC Basic Mobility Inpatient Short Form Raw Score is 21. A Raw score of greater than 16 suggests the patient may benefit from discharge to home. Please also refer to the recommendation of the Physical Therapist for safe discharge planning. Recommend Home, no PT services upon hospital D/C. Goals   Patient Goals to go home   STG Expiration Date 11/08/23   Short Term Goal #1 STG 1. Pt will be able to ambulate at least 300 feet with least restrictive device with Ind A in order to improve overall functional mobility and assist in safe d/c. STG 2. Pt will improve sitting/standing static/dynamic balance 1/2 grade in order to improve functional mobility and assist in safe d/c.   PT Treatment Day 0   Plan   Treatment/Interventions OT; Spoke to [employer] training   PT Frequency 1-2x/wk   Discharge Recommendation   PT Discharge Recommendation No rehabilitation needs   Equipment Recommended Cane   AM-[facility] Basic Mobility Inpatient   Turning in Flat Bed Without Bedrails 4   Lying on Back to Sitting on Edge of Flat Bed Without Bedrails 4   Moving Bed to Chair 4   Standing Up From Chair Using Arms 4   Walk in Room 3   Climb 3-5 Stairs With Railing 2   Basic Mobility Inpatient Raw Score 21   Basic Mobility Standardized Score 45.55   Highest Level Of Mobility   JH-HLM Goal 6: Walk 10 steps or more   JH-HLM Achieved 7: Walk 25 feet or more   Modified Bond Scale Modified Regan Scale 3   End of Consult   Patient Position at End of Consult All needs within reach; Bedside chair  (Left in care of nurse)         Meena Dobbs, PT DPT

## 2023-10-25 NOTE — PROCEDURES
Midline Insertion    Date/Time: 10/25/2023 2:37 PM    Performed by: Alannah Hong RN  Authorized by: Sergio Moody MD    Patient location:  Bedside  Other Assisting Provider: Yes (comment) [facility])    Consent:     Consent obtained: None required. Consent given by:  Patient    Risks discussed:  Arterial puncture, bleeding, infection, incorrect placement and nerve damage    Alternatives discussed:  Delayed treatment  Universal protocol:     Procedure explained and questions answered to patient or proxy's satisfaction: yes      Relevant documents present and verified: yes      Site/side marked: yes      Immediately prior to procedure, a time out was called: yes      Patient identity confirmed:  Verbally with patient, arm band, provided demographic data and hospital-assigned identification number  Pre-procedure details:     Hand hygiene: Hand hygiene performed prior to insertion      Sterile barrier technique: All elements of maximal sterile technique followed      Skin preparation:  ChloraPrep    Skin preparation agent: Skin preparation agent completely dried prior to procedure    Indications:     Midline indications: new site    Anesthesia (see MAR for exact dosages):      Anesthesia method:  None  Procedure details:     Location:  Right cephalic    Laterality:  Right    Site selection rationale:  Largest, most accessible vein    Catheter size:  18 gauge    Landmarks identified: yes      Ultrasound guidance: yes      Ultrasound image availability:  Not saved    Sterile ultrasound techniques: Sterile gel and sterile probe covers were used      Number of attempts:  1    Successful placement: yes      Catheter length (cm):  10    Exposed catheter length (cm):  0    Arm circumference (cm):  33    Lot number:  [id number] Exp: 6/30/2024  Post-procedure details:     Post-procedure:  Dressing applied and securement device placed    Assessment:  Blood return through all ports and free fluid flow Post-procedure complications: none      Patient tolerance of procedure:   Tolerated well, no immediate complications

## 2023-10-25 NOTE — PROGRESS NOTES
INTERNAL MEDICINE RESIDENCY PROGRESS NOTE     Name: Shaylee Acosta. Age & Sex: 72 y.o. male   MRN: [id number]  Unit/Bed#: [facility] 824-01   Encounter: [id number]  Team: SOD Team C     PATIENT INFORMATION     Name: Shaylee Acosta. Age & Sex: 72 y.o. male   MRN: [id number]  Hospital Stay Days: 1    ASSESSMENT/PLAN     Principal Problem:    Cellulitis of left lower extremity  Active Problems:    Hyperlipidemia    Type 2 diabetes mellitus with diabetic neuropathy ([facility])    Atrial fibrillation with RVR ([facility])    Essential hypertension    Benign prostatic hyperplasia with lower urinary tract symptoms    Sepsis ([address])    S/P transmetatarsal amputation of foot, right ([facility])      * Cellulitis of left lower extremity  Assessment & Plan  Patient presents to ED today with 1-day of increased erythema, warmth, and pain in LLE. He states he is unsure if he sustained a puncture wound on the bottom of his foot. He thinks it's possible he may have stepped on a nail when helping his daughter renovate her home. He did endorses some blood on his sock. Patient does have significant diabetic polyneuropathy and has undergone a transmetatarsal amputation on right foot in 2021. Patient was scheduled to see podiatry tomorrow. On arrival patient met SIRS criteria with T 101.4, . Labs were significant for leukocytosis of 15.08. Lactic acid was normal. Procal negative. CRP and ESR elevated. Preliminary read of XR looks normal with no signs of osteomyelitis. S/p IV vancomycin and cefepime x1, 1 L NS, Tylenol 975 mg in ED. On physical exam, LLE had significant erythema, swelling, and warmth. Patient states pain has improved. Puncture wound noted on sole of left foot. Blood cultures still pending; per recommendation from Pharmacy, will continue IV vancomycin and will d/c IV cefepime. X-ray of the left lower extremity indicated no acute osseous abnormality.     Given clinical picture, will order bilateral lower extremity arterial dopplers to determine if patient has PAD. Plan:  -Continue IV Vancomycin 1g q12h; d/c cefepime  -Follow-up pending blood cultures  -Bilateral lower extremity arterial doppler ordered  -TD given  -Monitor WBC and fever curves   -Tylenol PRN for fever, pain  -Podiatry consulted for in hospital evaluation and management  -Outpatient follow-up with podiatry recommended. S/P transmetatarsal amputation of foot, right ([address])  Assessment & Plan  Stable    Sepsis ([address])  Assessment & Plan  On arrival patient met SIRS criteria with T 101.4, . Labs were significant for leukocytosis of 15.08. Lactic acid was normal. Procal negative. CRP and ESR elevated. Preliminary read of XR looks normal with no signs of osteomyelitis. S/p IV vancomycin and cefepime x1, 1 L NS, Tylenol 975 mg in ED. Likely 2/2 to cellulitis of left lower extremity. UA significant for large leukocytes, 1+ protein, >1000 glucose, innumerable WBC. Negative nitrites. Urine culture pending. Endorsed some urinary frequency. Plan:  -Follow-up blood cultures, urine culture, MRSA  -Continue IV Vanc 1g q12h, per ID, d/c IV cefepime  -Trend WBC, fever curves  -Tylenol PRN for fever, pain  -See plan under "cellulitis of left lower extremity"    Benign prostatic hyperplasia with lower urinary tract symptoms  Assessment & Plan  Plan:  -Continue home Flomax 0.4 mg daily      Essential hypertension  Assessment & Plan  /72 on arrival, down to 132/77. Home regimen: Cardizem 90 mg BID, Lisinopril 40 mg daily, Metoprolol XL 75 mg BID. Plan:  -Continue to monitor BP  -Continue home cardizem and lisinopril,   -Increased metoprolol XL dose to 100 mg BID for heart rate control    Atrial fibrillation with RVR ([facility])  Assessment & Plan  EKG on arrival significant for "Afib RVR, incomplete RBBB, septal infarct (age undetermined). " Home regimen includes: Cardizem 90 mg BID, Eliquis 5 mg BID, Metoprolol 75 mg BID.  Tachycardia partially 2/2 sepsis due to cellulitis. On 10/25/23, patient went into Afib with heart rates ranging in the high 90s to 130s. Per nursing, patient had heart rates as high as in the 160s last night. Plan:  -Continue home Cardizem 90 mg BID  -Continue home Eliquis 5 mg BID, [id number] score 5  -Increase dose of home Metoprolol to 100 mg BID  -Monitor on telemetry       Type 2 diabetes mellitus with diabetic neuropathy [facility])  Assessment & Plan  Lab Results   Component Value Date    HGBA1C 8.8 (A) 07/05/2023       Recent Labs     10/24/23  2219 10/25/23  0108 10/25/23  0857 10/25/23  1118   POCGLU 220* 182* 181* 206*       Blood Sugar Average: Last 72 hrs:    Patient states he currently uses Lantus 41 units PM, Metformin 500 mg BID    Plan:  -Monitor BG  -Start on SSI, but will plan to adjust as needed pending further BG readings   -Carb-controlled diet   (P) [phone]      Hyperlipidemia  Assessment & Plan  Plan:  -Continue home Lipitor 40 mg daily         Disposition: Continue inpatient care for treatment with IV antibiotics    SUBJECTIVE     Patient seen and examined. No acute events overnight. Patient reports he feels pretty good and only has mild pain in his left lower extremity this morning, rating it a 2 or 3 out of 10. He reports this has improved from earlier today, where it was a 5 out of 10. He reports the left lower extremity feels warm, but doesn't appear swollen. Patient denies any fevers, chills, shortness of breath, difficulty breathing, chest pain, nausea, vomiting, or diarrhea. He also denies any feelings of palpitations, calf pain, headaches, or dizziness. He does endorse a two week history of increased urinary frequency, but denies any dysuria, hematuria, or discoloration of urine.      OBJECTIVE     Vitals:    10/24/23 2235 10/25/23 0349 10/25/23 0728 10/25/23 1119   BP:  115/70 135/83 134/83   BP Location:  Right arm     Pulse:  96 100 (!) 106   Resp:  16 18 18   Temp: (!) 100.8 °F (38.2 °C) 99.9 °F (37.7 °C) 98.4 °F (36.9 °C)    TempSrc: Oral Oral     SpO2:  95% 100% 97%      Temperature:   Temp (24hrs), Av.5 °F (38.1 °C), Min:98.4 °F (36.9 °C), Max:102.7 °F (39.3 °C)    Temperature: 98.4 °F (36.9 °C)  Intake & Output:  I/O         10/23 0701  10/24 0700 10/24 0701  10/25 0700 10/25 0701  10/26 07    IV Piggyback  1303.3     Total Intake  1303.3     Urine  370     Total Output  370     Net  +933.3                  Weights: There is no height or weight on file to calculate BMI. Weight (last 2 days)       None          Physical Exam  Vitals and nursing note reviewed. Constitutional:       General: He is not in acute distress. Appearance: Normal appearance. He is not toxic-appearing. HENT:      Head: Normocephalic and atraumatic. Right Ear: External ear normal.      Left Ear: External ear normal.      Nose: Nose normal.      Mouth/Throat:      Mouth: Mucous membranes are moist.      Pharynx: Oropharynx is clear. Eyes:      Extraocular Movements: Extraocular movements intact. Conjunctiva/sclera: Conjunctivae normal.      Pupils: Pupils are equal, round, and reactive to light. Cardiovascular:      Rate and Rhythm: Tachycardia present. Rhythm irregular. Heart sounds: No murmur heard. No friction rub. No gallop. Pulmonary:      Effort: Pulmonary effort is normal. No accessory muscle usage, respiratory distress or retractions. Breath sounds: Normal breath sounds. No wheezing, rhonchi or rales. Abdominal:      General: Abdomen is flat. Bowel sounds are normal. There is no distension. Palpations: Abdomen is soft. Tenderness: There is no abdominal tenderness. There is no guarding. Musculoskeletal:         General: Normal range of motion. Cervical back: Normal range of motion. Feet:    Feet:      Comments: Transmetatarsal amputation of right foot. Skin:     General: Skin is warm and dry.       Capillary Refill: Capillary refill takes less than 2 seconds. Findings: Erythema (left lower extremity from medial ankle up to upper thigh; assocated with warmth and slight swelling) present. Neurological:      Mental Status: He is alert and oriented to person, place, and time. Mental status is at baseline. Psychiatric:         Mood and Affect: Mood normal.         Behavior: Behavior normal. Behavior is cooperative. LABORATORY DATA     Labs: I have personally reviewed pertinent reports. Results from last 7 days   Lab Units 10/25/23  0620 10/24/23  1137   WBC Thousand/uL 21.81* 15.08*   HEMOGLOBIN g/dL 15.2 14.7   HEMATOCRIT % 47.7 44.8   PLATELETS Thousands/uL 204 224   NEUTROS PCT % 83* 84*   MONOS PCT % 7 7   EOS PCT % 1 1      Results from last 7 days   Lab Units 10/25/23  0620 10/24/23  1137   POTASSIUM mmol/L 4.1 4.4   CHLORIDE mmol/L 105 104   CO2 mmol/L 25 24   BUN mg/dL 14 19   CREATININE mg/dL 0.92 0.94   CALCIUM mg/dL 9.0 9.1   ALK PHOS U/L 85 89   ALT U/L 20 17   AST U/L 14 13              Results from last 7 days   Lab Units 10/24/23  1137   INR  1.37*   PTT seconds 33     Results from last 7 days   Lab Units 10/24/23  1137   LACTIC ACID mmol/L 1.0           IMAGING & DIAGNOSTIC TESTING     Radiology Results: I have personally reviewed pertinent reports. No results found. Other Diagnostic Testing: I have personally reviewed pertinent reports.     ACTIVE MEDICATIONS     Current Facility-Administered Medications   Medication Dose Route Frequency    acetaminophen (TYLENOL) tablet 975 mg  975 mg Oral Q6H PRN    apixaban (ELIQUIS) tablet 5 mg  5 mg Oral BID    atorvastatin (LIPITOR) tablet 40 mg  40 mg Oral Daily    diltiazem (CARDIZEM) tablet 90 mg  90 mg Oral BID    insulin lispro (HumaLOG) 100 units/mL subcutaneous injection 1-6 Units  1-6 Units Subcutaneous TID AC    insulin lispro (HumaLOG) 100 units/mL subcutaneous injection 1-6 Units  1-6 Units Subcutaneous HS    lisinopril (ZESTRIL) tablet 40 mg  40 mg Oral Daily    metoprolol succinate (TOPROL-XL) 24 hr tablet 100 mg  100 mg Oral BID    polyethylene glycol (MIRALAX) packet 17 g  17 g Oral Daily PRN    senna (SENOKOT) tablet 8.6 mg  1 tablet Oral Daily    tamsulosin (FLOMAX) capsule 0.4 mg  0.4 mg Oral Daily With Dinner    vancomycin (VANCOCIN) IVPB (premix in dextrose) 1,000 mg 200 mL  1,000 mg Intravenous Q12H       VTE Pharmacologic Prophylaxis: Eliquis  VTE Mechanical Prophylaxis: sequential compression device    Portions of the record may have been created with voice recognition software. Occasional wrong word or "sound a like" substitutions may have occurred due to the inherent limitations of voice recognition software. Read the chart carefully and recognize, using context, where substitutions have occurred.   ==  Jose Antonio MD  [address] [facility]  Internal Medicine Residency PGY-1

## 2023-10-25 NOTE — CONSULTS
Podiatry - Consultation    Patient Information:   Yesenia Zambrano. 72 y.o. male MRN: [id number]  Unit/Bed#: [facility] 824-01 Encounter: [id number]  PCP: Sathya Lowe MD  Date of Admission:  10/24/2023  Date of Consultation: 10/25/23  Requesting Physician: Ann Raines MD      ASSESSMENT:    Yesenia Zambrano. is a 72 y.o. male with:    Left diabetic foot ulceration - plantar lateral forefoot  Cellulitis of left lower extremity  Sepsis - POA  Type 2 DM with diabetic neuropathy  History of R TMA    PLAN:    Patient was seen and evaluated at bedside. Noted to have a plantar left forefoot wound with underlying fluctuance and surrounding cellulitis. Positive probe to bone after debridement, clinical concern for deep infection and possible osteomyelitis. Patient will likely need surgical intervention pending advanced imaging, cardiac clearance, and vascular recommendations. No concrete OR plans from podiatry at this time. Will re-evaluate in the morning, patient placed NPO at midnight pre-emptively. Patient is agreeable to potential surgical intervention. Performed bedside debridement/decompression of plantar wound, see procedure note below. Reviewed left foot radiographs final read: no evidence of osseous abnormality or osteomyelitis  Reviewed left foot LEADs: noted to be marginally within healing range 0.79 JOSIANE/127 MTP/52 GTP. Follow up left foot MRI  Follow up vascular surgery recommendations given marginal healing range after noninvasive vascular studies  Follow up final wound cultures   Follow up blood cultures - negative at 24 hours  Continue IV ABX per primary team  Continue to trend vitals and labs. Patient is febrile with leukocytosis. Patient known to have WBC in urine on UA and urine culture, may have possible UTI as additional source of infection. Patient reports increased frequency  Advised patient to stay nonweightbearing to the left lower extremity.   Patient was found to be ambulating barefoot in his room, advised patient that he should not be ambulating barefoot regardless of his weightbearing status as this increases his risk of wounds   Local wound care consisting of packing, DSD. Podiatry to do dressing changes at this time. Elevation on green foam wedges or pillows when non-ambulatory  Rest of care per primary team.  Will discuss this plan with my attending and update as needed. Weightbearing status: STRICT nonweightbearing to left lower extremity    Debridement Procedure:    After  Verbal Consent was obtained and time out performed. Wound located left plantar lateral forefoot was  excisionally Surgical- Subcutaneous  (CPT: 84857) debrided using scapel. Pre-debridement wound measures 0.5 cm x 0.5 cm x 0.1 cm. Pain was controlled by Lack of sensation due to Neuropathy . Post debridement measurement 1.1 cm x 0.5 cm x 1.5 cm for a total of 0.5 square centimeters, with wound appearing brisk pinpoint bleeding, granular, and fibrotic with ~1cc of purulent drainage. 100%  of wound debrided. Tissue debrided includes depth of Epidermis, Dermis, Fascia, and Subcutaneous with devitalized tissue being debrided including Hyperkeratosis and Fibrous. There was positive probe to bone after debridement. A small amount of bleeding was noted during procedure. Hemostasis was achieved using pressure. Pain noted during procedure rated as 0. Pain noted after procedure rated as 0. Procedure was Well tolerated by patient. SUBJECTIVE:    History of Present Illness:    Rula Dyer is a 72 y.o. male who is originally admitted 10/24/2023 due to left lower extremity cellulitis. Patient has a past medical history of type 2 diabetes with diabetic neuropathy, hyperlipidemia, hypertension, atrial fibrillation with RVR, right transmetatarsal amputation. We are consulted for left lower extremity cellulitis and left plantar wound.   Patient states that approximately 5 days ago he noticed that his left leg was starting to get red and swollen. Patient is unsure whether he had a puncture wound to the left foot as he states that he may have stepped on a nail when visiting his daughter's house which is under construction. Upon inspection of his boot, no puncture wound was noted within the shoe, puncture wound noted within insoles. However, patient noted that when he was changing his socks he also noted red drainage/blood. He was supposed to have an appointment today with his podiatrist, Dr. Veronika Mejia, but 2 days ago he called the office informing them of the discoloration to his left leg as well as the drainage and they informed him to present to the emergency room. He states that he is unsure how he obtained the wound in his left plantar foot,    The patient is resting comfortably and in no acute distress, does report fevers but denies chills, nausea, vomiting, chest pain, shortness of breath. Review of Systems:    Constitutional: Negative. HENT: Negative. Eyes: Negative. Respiratory: Negative. Cardiovascular: Negative. Gastrointestinal: Negative. Musculoskeletal: right TMA   Skin: left plantar wound   Neurological: lack of sensation in LE bilaterally   Psych: Negative.      Past Medical and Surgical History:     Past Medical History:   Diagnosis Date    Arthritis     Atrial fibrillation ([address])     Diagnosed 11/2018    Benign prostate hyperplasia 04/2002    Cellulitis     right lower leg     Colon polyp 2006    Diabetes mellitus ([address])     Hearing aid worn     bilat    Hearing loss     90% loss left ear and 40% right ear    History of clubfoot     "since birth"    History of pneumonia     History of TIA (transient ischemic attack) 04/25/2017 11/30/18 pt denies    Hyperlipidemia 5/15/2014    Hypertension     Infectious viral hepatitis     "cant remember type"    Irregular heart beat     Afib    Neuropathy     both feet    Osteomyelitis ([address])     right great toe    Pneumonia     Right middle lobe pulmonary nodule 11/6/2018    Seasickness     Teeth missing     Type 2 diabetes mellitus with diabetic neuropathy ([address]) 11/6/2018    Wears glasses     reading    Wound, open     bottom of right foot       Past Surgical History:   Procedure Laterality Date    BUNIONECTOMY Right 12/4/2018    Procedure: 5TH METATARSAL BONE PARTIAL RESECTION, FULL THICKNESS DEBRIDEMENT OF DIABETIC ULCER;  Surgeon: Robbie Jean DPM;  [facility];  Service: Podiatry    CLUB FOOT RELEASE Bilateral     COLONOSCOPY      COMPLEX WOUND CLOSURE TO EXTREMITY  4/27/2021    Procedure: COMPLEX WOUND CLOSURE TO EXTREMITY: RIGHT FOOT;  Surgeon: Naty Mir MD;  [facility];  Service: Plastics    EXTERNAL FIXATOR APPLICATION Right 4/98/7766    Procedure: Application multiplane external fixation;  Surgeon: Yordy Moore DPM;  [facility];  Service: Podiatry    FOOT HARDWARE REMOVAL Right 2/17/2021    Procedure: REMOVAL EXTERNAL FIXATOR;  Surgeon: Yordy Moore DPM;  [facility];  Service: Podiatry    FOREIGN BODY REMOVAL Right 4/27/2021    Procedure: REMOVAL FOREIGN BODY EXTREMITY: RIGHT FOOT;  Surgeon: Naty Mir MD;  [facility];  Service: Plastics    INCISION AND DRAINAGE OF WOUND Right 2/17/2021    Procedure: INCISION AND DRAINAGE (I&D) EXTREMITY;  Surgeon: Yordy Moore DPM;  [facility];  Service: Podiatry    ORIF FOOT FRACTURE Right 3/4/2021    Procedure: VAC PLACEMENT; SCREW FIXATION RIGHT FOOT FUSION;  Surgeon: Yordy Moore DPM;  [facility];  Service: Podiatry    IL AMPUTATION FOOT TRANSMETARSAL Right 2/22/2021    Procedure: AMPUTATION TRANSMETATARSAL (TMA), REMOVAL NAIL IM T2 ICF HARDWARE;  Surgeon: Yordy Moore DPM;  [facility];  Service: Podiatry    IL ARTHRD MIDTARSL/TARS MLT/TRANSVRS W/OSTEOT Right 2/12/2021    Procedure: foot ARTHRODESIS/FUSION;  Surgeon: Yordy Moore DPM;  [facility];  Service: Podiatry    IL [address] [place] [address] BONE MUSCLE &/FASCIA 20 SQ CM/< Right 12/2/2021    Procedure: DEBRIDEMENT OF TARSAL BONES WITH ANITBIODIC BEADS;  Surgeon: Eugenio Potter DPM;  [facility];  Service: Podiatry    UT LNGTH/SHRT TENDON LEG/ANKLE 1 TENDON SPX Right 2/12/2021    Procedure: LENGTHEN TIBIAL TENDON, TRANS HEEL CORD;  Surgeon: Eugenio Potter DPM;  [facility];  Service: Podiatry    UT MUSC MYOCUTANEOUS/FASCIOCUTANEOUS FLAP TRUNK Right 4/12/2021    Procedure: RECONSTRUCTION MICROSURGICAL W/ FREE FLAP;  Surgeon: Armond Quick MD;  [facility];  Service: Plastics    UT MUSC MYOCUTANEOUS/FASCIOCUTANEOUS FLAP TRUNK Right 4/12/2021    Procedure: TAKEBACK RECONSTRUCTION MICROSURGICAL W/ FREE FLAP;  Surgeon: Armond Quick MD;  [facility];  Service: Plastics    UT MUSC MYOCUTANEOUS/FASCIOCUTANEOUS FLAP TRUNK Right 4/13/2021    Procedure: RECONSTRUCTION MICROSURGICAL W/ FREE FLAP, RE EXPLORATION, MICRO VASCULAR REVISION;  Surgeon: Armond Quick MD;  [facility];  Service: Plastics    UT OSTC PRTL EXOSTC/CONDYLC METAR HEAD Right 12/29/2020    Procedure: EXCISION EXOSTOSIS;  Surgeon: Eugenio Potter DPM;  [facility];  Service: Podiatry    UT SECONDARY CLOSURE SURG WOUND/DEHSN EXTSV/COMPLIC Right 99/35/9964    Procedure: PRIMARY DELAYED CLOSURE;  Surgeon: Eugenio Potter DPM;  [facility];  Service: Podiatry    TOE AMPUTATION Right     partial great toe    TONSILLECTOMY      VAC DRESSING APPLICATION Right 9/4/1984    Procedure: APPLICATION VAC DRESSING EXTREMITY;  Surgeon: Armond Quick MD;  [facility];  Service: [address] DEBRIDEMENT Right 3/1/2021    Procedure: DEBRIDEMENT LOWER EXTREMITY [facility]);   Surgeon: Armond Quick MD;  [facility];  Service: Plastics    WOUND DEBRIDEMENT Right 4/7/2021    Procedure: DEBRIDEMENT RIGHT FOOT;  Surgeon: Armond Quick MD;  [facility];  Service: Plastics    WOUND DEBRIDEMENT Right 2021    Procedure: DEBRIDEMENT LOWER EXTREMITY [facility] OUT); Surgeon: Milagros Mckeon MD;  [facility];  Service: Plastics    WOUND DEBRIDEMENT Right 2021    Procedure: DEBRIDEMENT LOWER EXTREMITY ([address] [facility]): RIGHT FOOT;  Surgeon: Milagros Mckeon MD;  [facility];  Service: Plastics       Meds/Allergies:    Medications Prior to Admission   Medication    Accu-Chek Guide test strip    acetaminophen (Mapap Arthritis Pain) 650 mg CR tablet    atorvastatin (LIPITOR) 40 mg tablet    diltiazem (CARDIZEM) 90 mg tablet    Eliquis 5 MG    glucose blood (OneTouch Ultra) test strip    glucose blood (OneTouch Verio) test strip    Insulin Glargine Solostar (Lantus SoloStar) 100 UNIT/ML SOPN    Lancets (onetouch ultrasoft) lancets    Lancets (onetouch ultrasoft) lancets    lisinopril (ZESTRIL) 40 mg tablet    metFORMIN (GLUCOPHAGE) 500 mg tablet    metoprolol succinate (TOPROL-XL) 25 mg 24 hr tablet    tamsulosin (FLOMAX) 0.4 mg    Clenpiq 10-3.5-12 MG-GM -GM/160ML SOLN       Allergies   Allergen Reactions    Simvastatin Myalgia    Itraconazole Other (See Comments)     Pt denies knowledge of allergy. .        Social History:     Marital Status: /Civil Union    Substance Use History:   Social History     Substance and Sexual Activity   Alcohol Use Yes    Comment: occasionally     Social History     Tobacco Use   Smoking Status Former    Types: Cigarettes    Quit date: 1988    Years since quittin.9   Smokeless Tobacco Former   Tobacco Comments    exposure to passive smoke     Social History     Substance and Sexual Activity   Drug Use Not Currently       Family History:    Family History   Problem Relation Age of Onset    Diabetes Father         mellitus         OBJECTIVE:    Vitals:   Blood Pressure: 115/71 (10/25/23 1602)  Pulse: 101 (10/25/23 1602)  Temperature: (!) 101.9 °F (38.8 °C) (10/25/23 1602)  Temp Source: Oral (10/25/23 0349)  Respirations: 16 (10/25/23 1602)  SpO2: 97 % (10/25/23 1602)    Physical Exam:    General Appearance: Alert, cooperative, no distress. HEENT: Head normocephalic, atraumatic, without obvious abnormality. Heart: Normal rate and rhythm. Lungs: Non-labored breathing. No respiratory distress. Abdomen: Without distension. Psychiatric: AAOx3  Lower Extremity:  Vascular:   Right DP and PT pulses are biphasic by Doppler. Left DP and PT pulses are biphasic by Doppler. CRT < 3 seconds at the digits. +0/4 edema noted at bilateral lower extremities. Pedal hair is absent. Skin temperature on left lower extremity is warm compared to contralateral side. Musculoskeletal:  MMT is 5/5 in all muscle compartments bilaterally. ROM at the ankle joint are within normal limits with the leg extended. No Pain  with global palpation of lower extremities bilaterally. No gross deformities noted. Dermatological:  Lower extremity wound(s) as noted below:    Wound #: 1  Location: left plantar lateral forefoot  Length 1.1cm: Width 0.5cm: Depth 1.5cm:   Deepest Tissue Noted in Base: bone  Probe to Bone: Yes  Peripheral Skin Description: Attached  Granulation: 80% Fibrotic Tissue: 20% Necrotic Tissue: 0%   Drainage Amount: moderate, serosanguinous, purulent  Signs of Infection: Yes  - surrounding erythema, mild purulent drainage    Neurological:  Gross sensation is diminished. Protective sensation is absent. Patient Reports numbness and/or paresthesias.     Clinical Images 10/25/23:                    Additional data:     Lab Results: I have personally reviewed pertinent labs including:    Results from last 7 days   Lab Units 10/25/23  0620   WBC Thousand/uL 21.81*   HEMOGLOBIN g/dL 15.2   HEMATOCRIT % 47.7   PLATELETS Thousands/uL 204   NEUTROS PCT % 83*   LYMPHS PCT % 8*   MONOS PCT % 7   EOS PCT % 1     Results from last 7 days   Lab Units 10/25/23  0620   POTASSIUM mmol/L 4.1   CHLORIDE mmol/L 105   CO2 mmol/L 25   BUN mg/dL 14   CREATININE mg/dL 0.92   CALCIUM mg/dL 9.0   ALK PHOS U/L 85   ALT U/L 20   AST U/L 14     Results from last 7 days   Lab Units 10/24/23  1137   INR  1.37*       Cultures: I have personally reviewed pertinent cultures including:    Results from last 7 days   Lab Units 10/24/23  1205 10/24/23  1142 10/24/23  1137   BLOOD CULTURE   --  No Growth at 24 hrs. No Growth at 24 hrs. URINE CULTURE  Culture too young- will reincubate  --   --            Imaging: I have personally reviewed pertinent reports in PACS. EKG, Pathology, and Other Studies: I have personally reviewed pertinent reports. ** Please Note: Portions of the record may have been created with voice recognition software. Occasional wrong word or "sound a like" substitutions may have occurred due to the inherent limitations of voice recognition software. Read the chart carefully and recognize, using context, where substitutions have occurred.  **

## 2023-10-25 NOTE — PROGRESS NOTES
Kendrick Baptiste is a 72 y.o. male who is currently ordered Vancomycin IV with management by the Pharmacy Consult service. Relevant clinical data and objective / subjective history reviewed. Vancomycin Assessment:  Indication and Goal AUC/Trough: Soft tissue (goal -600, trough >10)  Clinical Status: stable  Micro:   Urine culture pending  Blood cultures 2/2 pending  Renal Function:  SCr: 0.92 mg/dL  CrCl: 90.4 mL/min  Renal replacement: Not on dialysis  Days of Therapy: 2  Current Dose:  1000 mg Q12H  Vancomycin Plan:  New Dosin mg Q12H  Estimated AUC: 433 mcg*hr/mL  Estimated Trough: 15.1 mcg/mL  Next Level: 10 AM Draw  Renal Function Monitoring: Daily BMP and UOP  Pharmacy will continue to follow closely for s/sx of nephrotoxicity, infusion reactions and appropriateness of therapy. BMP and CBC will be ordered per protocol. We will continue to follow the patient’s culture results and clinical progress daily.     Bonny Beverly, Pharmacist

## 2023-10-25 NOTE — UTILIZATION REVIEW
Initial Clinical Review    Admission: Date/Time/Statement:   Admission Orders (From admission, onward)       Ordered        10/24/23 4777 [facility]  Once                          Orders Placed This Encounter   Procedures    INPATIENT ADMISSION     Standing Status:   Standing     Number of Occurrences:   1     Order Specific Question:   Level of Care     Answer:   Med Surg [16]     Order Specific Question:   Estimated length of stay     Answer:   More than 2 Midnights     Order Specific Question:   Certification     Answer:   I certify that inpatient services are medically necessary for this patient for a duration of greater than two midnights. See H&P and MD Progress Notes for additional information about the patient's course of treatment. ED Arrival Information       Expected   -    Arrival   10/24/2023 11:04    Acuity   Emergent              Means of arrival   Walk-In    Escorted by   Self    Service   [facility]    Admission type   Emergency              Arrival complaint   leg pain             Chief Complaint   Patient presents with    Cellulitis     Patient f/u appt with podiatry tomorrow, however, leg is swollen, red and warm to touch. Advised to come to ED for eval.        Initial Presentation: 72 y.o. male to the ED with complaints of left lower extremity swelling, redness, warmth. Admitted to inpatient for cellulitis LLE, sepsis. H/O diabetes, diabetic neuropathy, hypertension, hyperlipidemia, atrial fibrillation on Eliquis . Arrives tachycardic, febrile, hypertensive with erythema over LLE. Sed rate 27. WBCs 15.08. CRP 53. 1. Xray left foot without acute findings. Blood cultures pending. Started on IV abx in the ED. EKG shows afib RVR, incomplete RBBB septal infarct. Continue home Eliquis, cardizem, metoprolol. Date: 10/25   Day 2:  Uncontrolled DMII with left foot wound/cellulitis. Possible OR intervention. Bedside washout completed today. MRI foot pending.      Podiatry consult: Positive probe to bone after debridement, clinical concern for deep infection and possible osteomyelitis. Patient will likely need surgical intervention pending advanced imaging, cardiac clearance, and vascular recommendations. No concrete OR plans from podiatry at this time. Recheck in the morning. Date: 10/26    Day 3: Has surpassed a 2nd midnight with active treatments and services, which include continue with IV abx, possible or intervention.       ED Triage Vitals [10/24/23 1108]   Temperature Pulse Respirations Blood Pressure SpO2   (!) 101.4 °F (38.6 °C) (!) 124 20 (!) 162/117 97 %      Temp Source Heart Rate Source Patient Position - Orthostatic VS BP Location FiO2 (%)   Oral Monitor Sitting Left arm --      Pain Score       5          Wt Readings from Last 1 Encounters:   07/25/23 103 kg (228 lb)     Additional Vital Signs: ital Signs (last 2 days)    Date/Time Temp Pulse Resp BP MAP (mmHg) SpO2 O2 Device Patient Position - Orthostatic VS   10/25/23 0922 -- -- -- -- -- -- None (Room air) --   10/25/23 07:28:04 98.4 °F (36.9 °C) 100 18 135/83 100 100 % -- --   10/25/23 03:49:24 99.9 °F (37.7 °C) 96 16 115/70 85 95 % -- Lying   10/25/23 0100 -- -- -- -- -- -- None (Room air) --   10/24/23 2235 100.8 °F (38.2 °C) Abnormal  -- -- -- -- -- -- --   10/24/23 2013 -- 107 Abnormal  27 Abnormal  168/82 111 92 % -- --   10/24/23 2010 102.7 °F (39.3 °C) Abnormal  126 Abnormal  35 Abnormal  168/82 -- 98 % None (Room air) Lying   10/24/23 2007 -- 109 Abnormal  -- 168/82 -- -- -- --   10/24/23 1633 -- 91 18 131/68 -- 98 % -- --   10/24/23 1445 -- 85 18 131/75 94 98 % -- --   10/24/23 1300 -- 109 Abnormal  22 132/77 -- 96 % -- --   10/24/23 1108 101.4 °F (38.6 °C) Abnormal  124 Abnormal  20 162/117 Abnormal  -- 97 % None (Room air) Sitting     Pertinent Labs/Diagnostic Test Results:   10/24 EKG: Atrial fibrillation with rapid ventricular response  Incomplete right bundle branch block  Septal infarct , age undetermined  Abnormal ECG  When compared with ECG of 22-FEB-2021 18:57,  Septal infarct is now Present  Nonspecific T wave abnormality no longer evident in Inferior leads  XR foot 3+ views LEFT   ED Interpretation by Hilary Cyr MD (10/24 1343)   No acute bony abnormalities. No subcutaneous emphysema. Final Result by Delores Pavon MD (10/25 2265)      No acute osseous abnormality.             Workstation performed: [id number]               Results from last 7 days   Lab Units 10/25/23  0620 10/24/23  1137   WBC Thousand/uL 21.81* 15.08*   HEMOGLOBIN g/dL 15.2 14.7   HEMATOCRIT % 47.7 44.8   PLATELETS Thousands/uL 204 224   NEUTROS ABS Thousands/µL 18.27* 12.63*         Results from last 7 days   Lab Units 10/25/23  0620 10/24/23  1137   SODIUM mmol/L 137 135   POTASSIUM mmol/L 4.1 4.4   CHLORIDE mmol/L 105 104   CO2 mmol/L 25 24   ANION GAP mmol/L 7 7   BUN mg/dL 14 19   CREATININE mg/dL 0.92 0.94   EGFR ml/min/1.73sq m 83 81   CALCIUM mg/dL 9.0 9.1     Results from last 7 days   Lab Units 10/25/23  0620 10/24/23  1137   AST U/L 14 13   ALT U/L 20 17   ALK PHOS U/L 85 89   TOTAL PROTEIN g/dL 7.2 7.0   ALBUMIN g/dL 4.1 4.1   TOTAL BILIRUBIN mg/dL 1.54* 1.05*     Results from last 7 days   Lab Units 10/25/23  0857 10/25/23  0108 10/24/23  2219 10/24/23  1954 10/24/23  1710   POC GLUCOSE mg/dl 181* 182* 220* 227* 249*     Results from last 7 days   Lab Units 10/25/23  0620 10/24/23  1137   GLUCOSE RANDOM mg/dL 178* 288*           Results from last 7 days   Lab Units 10/24/23  1137   PROTIME seconds 16.7*   INR  1.37*   PTT seconds 33         Results from last 7 days   Lab Units 10/24/23  1137   PROCALCITONIN ng/ml 0.09     Results from last 7 days   Lab Units 10/24/23  1137   LACTIC ACID mmol/L 1.0       Results from last 7 days   Lab Units 10/24/23  1142   CRP mg/L 53.1*   SED RATE mm/hour 27*     Results from last 7 days   Lab Units 10/24/23  1205   CLARITY UA  Turbid   COLOR UA  Yellow   SPEC GRAV UA  1.022   PH UA  7.5   GLUCOSE UA mg/dl >=1000 (1%)*   KETONES UA mg/dl Negative   BLOOD UA  Negative   PROTEIN UA mg/dl 50 (1+)*   NITRITE UA  Negative   BILIRUBIN UA  Negative   UROBILINOGEN UA (BE) mg/dl <2.0   LEUKOCYTES UA  Large*   WBC UA /hpf Innumerable*   RBC UA /hpf None Seen   BACTERIA UA /hpf None Seen   EPITHELIAL CELLS WET PREP /hpf None Seen     Results from last 7 days   Lab Units 10/24/23  1142 10/24/23  1137   BLOOD CULTURE  Received in Microbiology Lab. Culture in Progress. Received in Microbiology Lab. Culture in Progress.      Results from last 7 days   Lab Units 10/25/23  0620   VANCOMYCIN RM ug/mL 7.0*       ED Treatment:   Medication Administration from 10/24/2023 1103 to 10/25/2023 0051         Date/Time Order Dose Route Action Comments     10/24/2023 1143 EDT cefepime (MAXIPIME) 2 g/50 mL dextrose IVPB 2,000 mg Intravenous New Bag --     10/24/2023 1216 EDT vancomycin (VANCOCIN) 1500 mg in sodium chloride 0.9% 250 mL IVPB 1,500 mg Intravenous New Bag --     10/24/2023 1143 EDT acetaminophen (TYLENOL) tablet 975 mg 975 mg Oral Given --     10/24/2023 1130 EDT sodium chloride 0.9 % bolus 1,000 mL 1,000 mL Intravenous New Bag --     10/24/2023 2007 EDT diltiazem (CARDIZEM) tablet 90 mg 90 mg Oral Given --     10/24/2023 2007 EDT apixaban (ELIQUIS) tablet 5 mg 5 mg Oral Given --     10/24/2023 2007 EDT metoprolol succinate (TOPROL-XL) 24 hr tablet 75 mg 75 mg Oral Given --     10/24/2023 1746 EDT tamsulosin (FLOMAX) capsule 0.4 mg 0.4 mg Oral Given --     10/24/2023 2012 EDT acetaminophen (TYLENOL) tablet 975 mg 975 mg Oral Given --     10/24/2023 1955 EDT insulin lispro (HumaLOG) 100 units/mL subcutaneous injection 1-6 Units 2 Units Subcutaneous Given --     10/24/2023 2238 EDT insulin lispro (HumaLOG) 100 units/mL subcutaneous injection 1-6 Units 2 Units Subcutaneous Given --     10/24/2023 4101 EDT cefepime (MAXIPIME) 2 g/50 mL dextrose IVPB 2,000 mg Intravenous New Bag -- 10/24/2023 1951 EDT vancomycin (VANCOCIN) IVPB (premix in dextrose) 1,000 mg 200 mL 1,000 mg Intravenous New Bag --     10/24/2023 1704 EDT tetanus-diphtheria toxoids (TENIVAC) IM injection 0.5 mL 0.5 mL Intramuscular Given --          Past Medical History:   Diagnosis Date    Arthritis     Atrial fibrillation ([address])     Diagnosed 11/2018    Benign prostate hyperplasia 04/2002    Cellulitis     right lower leg     Colon polyp 2006    Diabetes mellitus ([address])     Hearing aid worn     bilat    Hearing loss     90% loss left ear and 40% right ear    History of clubfoot     "since birth"    History of pneumonia     History of TIA (transient ischemic attack) 04/25/2017 11/30/18 pt denies    Hyperlipidemia 5/15/2014    Hypertension     Infectious viral hepatitis     "cant remember type"    Irregular heart beat     Afib    Neuropathy     both feet    Osteomyelitis (HCC)     right great toe    Pneumonia     Right middle lobe pulmonary nodule 11/6/2018    Seasickness     Teeth missing     Type 2 diabetes mellitus with diabetic neuropathy ([address]) 11/6/2018    Wears glasses     reading    Wound, open     bottom of right foot         Admitting Diagnosis: Leg pain [M79.606]  Cellulitis of left lower leg [L03.116]  Sepsis ([address]) [A41.9]  Age/Sex: 72 y.o. male  Admission Orders:  Scheduled Medications:  apixaban, 5 mg, Oral, BID  atorvastatin, 40 mg, Oral, Daily  cefepime, 2,000 mg, Intravenous, Q8H  diltiazem, 90 mg, Oral, BID  insulin lispro, 1-6 Units, Subcutaneous, TID AC  insulin lispro, 1-6 Units, Subcutaneous, HS  lisinopril, 40 mg, Oral, Daily  metoprolol succinate, 75 mg, Oral, BID  senna, 1 tablet, Oral, Daily  tamsulosin, 0.4 mg, Oral, Daily With Dinner  vancomycin, 1,000 mg, Intravenous, Q12H      Continuous IV Infusions:     PRN Meds:  acetaminophen, 975 mg, Oral, Q6H PRN  polyethylene glycol, 17 g, Oral, Daily PRN        IP CONSULT TO CASE MANAGEMENT  IP CONSULT TO PHARMACY    Network Utilization Review Department  ATTENTION: Please call with any questions or concerns to [phone] and carefully listen to the prompts so that you are directed to the right person. All voicemails are confidential.   For Discharge needs, contact Care Management DC Support Team at [phone] opt. 2  Send all requests for admission clinical reviews, approved or denied determinations and any other requests to dedicated fax number below belonging to the campus where the patient is receiving treatment.  List of dedicated fax numbers for the Facilities:  [place] DENIALS (Administrative/Medical Necessity) [phone]   DISCHARGE SUPPORT TEAM (NETWORK) [address] [facility] (Maternity/NICU/Pediatrics) [phone]   [address] 1521 [facility] 1000 [facility] [phone]   [address] [place] [address] [address] [address] [facility] [address] [facility] [address] [phone]

## 2023-10-25 NOTE — PLAN OF CARE
Problem: PAIN - ADULT  Goal: Verbalizes/displays adequate comfort level or baseline comfort level  Description: Interventions:  - Encourage patient to monitor pain and request assistance  - Assess pain using appropriate pain scale  - Administer analgesics based on type and severity of pain and evaluate response  - Implement non-pharmacological measures as appropriate and evaluate response  - Consider cultural and social influences on pain and pain management  - Notify physician/advanced practitioner if interventions unsuccessful or patient reports new pain  Outcome: Progressing     Problem: INFECTION - ADULT  Goal: Absence or prevention of progression during hospitalization  Description: INTERVENTIONS:  - Assess and monitor for signs and symptoms of infection  - Monitor lab/diagnostic results  - Monitor all insertion sites, i.e. indwelling lines, tubes, and drains  - Monitor endotracheal if appropriate and nasal secretions for changes in amount and color  - [place] appropriate cooling/warming therapies per order  - Administer medications as ordered  - Instruct and encourage patient and family to use good hand hygiene technique  - Identify and instruct in appropriate isolation precautions for identified infection/condition  Outcome: Progressing     Problem: SAFETY ADULT  Goal: Patient will remain free of falls  Description: INTERVENTIONS:  - Educate patient/family on patient safety including physical limitations  - Instruct patient to call for assistance with activity   - Consult OT/PT to assist with strengthening/mobility   - Keep Call bell within reach  - Keep bed low and locked with side rails adjusted as appropriate  - Keep care items and personal belongings within reach  - Initiate and maintain comfort rounds  - Make Fall Risk Sign visible to staff  - Apply yellow socks and bracelet for high fall risk patients  - Consider moving patient to room near nurses station  Outcome: Progressing

## 2023-10-25 NOTE — PLAN OF CARE
Problem: PAIN - ADULT  Goal: Verbalizes/displays adequate comfort level or baseline comfort level  Description: Interventions:  - Encourage patient to monitor pain and request assistance  - Assess pain using appropriate pain scale  - Administer analgesics based on type and severity of pain and evaluate response  - Implement non-pharmacological measures as appropriate and evaluate response  - Consider cultural and social influences on pain and pain management  - Notify physician/advanced practitioner if interventions unsuccessful or patient reports new pain  Outcome: Progressing     Problem: INFECTION - ADULT  Goal: Absence or prevention of progression during hospitalization  Description: INTERVENTIONS:  - Assess and monitor for signs and symptoms of infection  - Monitor lab/diagnostic results  - Monitor all insertion sites, i.e. indwelling lines, tubes, and drains  - Monitor endotracheal if appropriate and nasal secretions for changes in amount and color  - [place] appropriate cooling/warming therapies per order  - Administer medications as ordered  - Instruct and encourage patient and family to use good hand hygiene technique  - Identify and instruct in appropriate isolation precautions for identified infection/condition  Outcome: Progressing  Goal: Absence of fever/infection during neutropenic period  Description: INTERVENTIONS:  - Monitor WBC    Outcome: Progressing     Problem: SAFETY ADULT  Goal: Patient will remain free of falls  Description: INTERVENTIONS:  - Educate patient/family on patient safety including physical limitations  - Instruct patient to call for assistance with activity   - Consult OT/PT to assist with strengthening/mobility   - Keep Call bell within reach  - Keep bed low and locked with side rails adjusted as appropriate  - Keep care items and personal belongings within reach  - Initiate and maintain comfort rounds  - Make Fall Risk Sign visible to staff  - Apply yellow socks and bracelet for high fall risk patients  - Consider moving patient to room near nurses station  Outcome: Progressing  Goal: Maintain or return to baseline ADL function  Description: INTERVENTIONS:  -  Assess patient's ability to carry out ADLs; assess patient's baseline for ADL function and identify physical deficits which impact ability to perform ADLs (bathing, care of mouth/teeth, toileting, grooming, dressing, etc.)  - Assess/evaluate cause of self-care deficits   - Assess range of motion  - Assess patient's mobility; develop plan if impaired  - Assess patient's need for assistive devices and provide as appropriate  - Encourage maximum independence but intervene and supervise when necessary  - Involve family in performance of ADLs  - Assess for home care needs following discharge   - Consider OT consult to assist with ADL evaluation and planning for discharge  - Provide patient education as appropriate  Outcome: Progressing  Goal: Maintains/Returns to pre admission functional level  Description: INTERVENTIONS:  - Perform BMAT or MOVE assessment daily.   - Set and communicate daily mobility goal to care team and patient/family/caregiver.    - Collaborate with rehabilitation services on mobility goals if consulted  - Out of bed for toileting  - Record patient progress and toleration of activity level   Outcome: Progressing     Problem: DISCHARGE PLANNING  Goal: Discharge to home or other facility with appropriate resources  Description: INTERVENTIONS:  - Identify barriers to discharge w/patient and caregiver  - Arrange for needed discharge resources and transportation as appropriate  - Identify discharge learning needs (meds, wound care, etc.)  - Arrange for interpretive services to assist at discharge as needed  - Refer to Case Management Department for coordinating discharge planning if the patient needs post-hospital services based on physician/advanced practitioner order or complex needs related to functional status, cognitive ability, or social support system  Outcome: Progressing     Problem: Knowledge Deficit  Goal: Patient/family/caregiver demonstrates understanding of disease process, treatment plan, medications, and discharge instructions  Description: Complete learning assessment and assess knowledge base.   Interventions:  - Provide teaching at level of understanding  - Provide teaching via preferred learning methods  Outcome: Progressing     Problem: Prexisting or High Potential for Compromised Skin Integrity  Goal: Skin integrity is maintained or improved  Description: INTERVENTIONS:  - Identify patients at risk for skin breakdown  - Assess and monitor skin integrity  - Assess and monitor nutrition and hydration status  - Monitor labs   - Assess for incontinence   - Turn and reposition patient  - Assist with mobility/ambulation  - Relieve pressure over bony prominences  - Avoid friction and shearing  - Provide appropriate hygiene as needed including keeping skin clean and dry  - Evaluate need for skin moisturizer/barrier cream  - Collaborate with interdisciplinary team   - Patient/family teaching  - Consider wound care consult   Outcome: Progressing

## 2023-10-26 ENCOUNTER — APPOINTMENT (INPATIENT)
Dept: RADIOLOGY | Facility: HOSPITAL | Age: 73
DRG: 854 | End: 2023-10-26
Payer: COMMERCIAL

## 2023-10-26 ENCOUNTER — ANESTHESIA (INPATIENT)
Dept: PERIOP | Facility: HOSPITAL | Age: 73
DRG: 854 | End: 2023-10-26
Payer: COMMERCIAL

## 2023-10-26 ENCOUNTER — ANESTHESIA EVENT (INPATIENT)
Dept: PERIOP | Facility: HOSPITAL | Age: 73
DRG: 854 | End: 2023-10-26
Payer: COMMERCIAL

## 2023-10-26 LAB
ANION GAP SERPL CALCULATED.3IONS-SCNC: 10 MMOL/L
BASOPHILS # BLD AUTO: 0.04 THOUSANDS/ÂΜL (ref 0–0.1)
BASOPHILS NFR BLD AUTO: 0 % (ref 0–1)
BUN SERPL-MCNC: 15 MG/DL (ref 5–25)
CALCIUM SERPL-MCNC: 8.4 MG/DL (ref 8.4–10.2)
CHLORIDE SERPL-SCNC: 106 MMOL/L (ref 96–108)
CO2 SERPL-SCNC: 21 MMOL/L (ref 21–32)
CREAT SERPL-MCNC: 0.91 MG/DL (ref 0.6–1.3)
EOSINOPHIL # BLD AUTO: 0.08 THOUSAND/ÂΜL (ref 0–0.61)
EOSINOPHIL NFR BLD AUTO: 0 % (ref 0–6)
ERYTHROCYTE [DISTWIDTH] IN BLOOD BY AUTOMATED COUNT: 14 % (ref 11.6–15.1)
GFR SERPL CREATININE-BSD FRML MDRD: 84 ML/MIN/1.73SQ M
GLUCOSE SERPL-MCNC: 167 MG/DL (ref 65–140)
GLUCOSE SERPL-MCNC: 175 MG/DL (ref 65–140)
GLUCOSE SERPL-MCNC: 195 MG/DL (ref 65–140)
GLUCOSE SERPL-MCNC: 203 MG/DL (ref 65–140)
GLUCOSE SERPL-MCNC: 218 MG/DL (ref 65–140)
GLUCOSE SERPL-MCNC: 232 MG/DL (ref 65–140)
HCT VFR BLD AUTO: 41.4 % (ref 36.5–49.3)
HGB BLD-MCNC: 13.2 G/DL (ref 12–17)
IMM GRANULOCYTES # BLD AUTO: 0.16 THOUSAND/UL (ref 0–0.2)
IMM GRANULOCYTES NFR BLD AUTO: 1 % (ref 0–2)
LYMPHOCYTES # BLD AUTO: 1.07 THOUSANDS/ÂΜL (ref 0.6–4.47)
LYMPHOCYTES NFR BLD AUTO: 6 % (ref 14–44)
MCH RBC QN AUTO: 31.5 PG (ref 26.8–34.3)
MCHC RBC AUTO-ENTMCNC: 31.9 G/DL (ref 31.4–37.4)
MCV RBC AUTO: 99 FL (ref 82–98)
MONOCYTES # BLD AUTO: 1.18 THOUSAND/ÂΜL (ref 0.17–1.22)
MONOCYTES NFR BLD AUTO: 6 % (ref 4–12)
NEUTROPHILS # BLD AUTO: 16.94 THOUSANDS/ÂΜL (ref 1.85–7.62)
NEUTS SEG NFR BLD AUTO: 87 % (ref 43–75)
NRBC BLD AUTO-RTO: 0 /100 WBCS
PLATELET # BLD AUTO: 185 THOUSANDS/UL (ref 149–390)
PMV BLD AUTO: 11 FL (ref 8.9–12.7)
POTASSIUM SERPL-SCNC: 4.1 MMOL/L (ref 3.5–5.3)
RBC # BLD AUTO: 4.19 MILLION/UL (ref 3.88–5.62)
SODIUM SERPL-SCNC: 137 MMOL/L (ref 135–147)
WBC # BLD AUTO: 19.47 THOUSAND/UL (ref 4.31–10.16)

## 2023-10-26 PROCEDURE — 82948 REAGENT STRIP/BLOOD GLUCOSE: CPT

## 2023-10-26 PROCEDURE — 80048 BASIC METABOLIC PNL TOTAL CA: CPT

## 2023-10-26 PROCEDURE — 87147 CULTURE TYPE IMMUNOLOGIC: CPT | Performed by: PODIATRIST

## 2023-10-26 PROCEDURE — 99233 SBSQ HOSP IP/OBS HIGH 50: CPT | Performed by: PODIATRIST

## 2023-10-26 PROCEDURE — 0JBP0ZZ EXCISION OF LEFT LOWER LEG SUBCUTANEOUS TISSUE AND FASCIA, OPEN APPROACH: ICD-10-PCS | Performed by: PODIATRIST

## 2023-10-26 PROCEDURE — 87206 SMEAR FLUORESCENT/ACID STAI: CPT | Performed by: PODIATRIST

## 2023-10-26 PROCEDURE — 85025 COMPLETE CBC W/AUTO DIFF WBC: CPT

## 2023-10-26 PROCEDURE — 87205 SMEAR GRAM STAIN: CPT | Performed by: PODIATRIST

## 2023-10-26 PROCEDURE — 99232 SBSQ HOSP IP/OBS MODERATE 35: CPT | Performed by: INTERNAL MEDICINE

## 2023-10-26 PROCEDURE — 87070 CULTURE OTHR SPECIMN AEROBIC: CPT | Performed by: PODIATRIST

## 2023-10-26 PROCEDURE — 28003 TREATMENT OF FOOT INFECTION: CPT | Performed by: PODIATRIST

## 2023-10-26 PROCEDURE — 73630 X-RAY EXAM OF FOOT: CPT

## 2023-10-26 PROCEDURE — 87116 MYCOBACTERIA CULTURE: CPT | Performed by: PODIATRIST

## 2023-10-26 PROCEDURE — 87186 SC STD MICRODIL/AGAR DIL: CPT | Performed by: PODIATRIST

## 2023-10-26 PROCEDURE — 87075 CULTR BACTERIA EXCEPT BLOOD: CPT | Performed by: PODIATRIST

## 2023-10-26 PROCEDURE — 93922 UPR/L XTREMITY ART 2 LEVELS: CPT | Performed by: SURGERY

## 2023-10-26 PROCEDURE — 93925 LOWER EXTREMITY STUDY: CPT | Performed by: SURGERY

## 2023-10-26 RX ORDER — MIDAZOLAM HYDROCHLORIDE 2 MG/2ML
INJECTION, SOLUTION INTRAMUSCULAR; INTRAVENOUS AS NEEDED
Status: DISCONTINUED | OUTPATIENT
Start: 2023-10-26 | End: 2023-10-26

## 2023-10-26 RX ORDER — ONDANSETRON 2 MG/ML
INJECTION INTRAMUSCULAR; INTRAVENOUS AS NEEDED
Status: DISCONTINUED | OUTPATIENT
Start: 2023-10-26 | End: 2023-10-26

## 2023-10-26 RX ORDER — OXYCODONE HYDROCHLORIDE AND ACETAMINOPHEN 5; 325 MG/1; MG/1
1 TABLET ORAL EVERY 4 HOURS PRN
Status: DISCONTINUED | OUTPATIENT
Start: 2023-10-26 | End: 2023-10-27

## 2023-10-26 RX ORDER — FENTANYL CITRATE 50 UG/ML
INJECTION, SOLUTION INTRAMUSCULAR; INTRAVENOUS AS NEEDED
Status: DISCONTINUED | OUTPATIENT
Start: 2023-10-26 | End: 2023-10-26

## 2023-10-26 RX ORDER — PROPOFOL 10 MG/ML
INJECTION, EMULSION INTRAVENOUS AS NEEDED
Status: DISCONTINUED | OUTPATIENT
Start: 2023-10-26 | End: 2023-10-26

## 2023-10-26 RX ORDER — PROPOFOL 10 MG/ML
INJECTION, EMULSION INTRAVENOUS CONTINUOUS PRN
Status: DISCONTINUED | OUTPATIENT
Start: 2023-10-26 | End: 2023-10-26

## 2023-10-26 RX ORDER — ALBUTEROL SULFATE 2.5 MG/3ML
2.5 SOLUTION RESPIRATORY (INHALATION) ONCE AS NEEDED
Status: DISCONTINUED | OUTPATIENT
Start: 2023-10-26 | End: 2023-10-26 | Stop reason: HOSPADM

## 2023-10-26 RX ORDER — FENTANYL CITRATE/PF 50 MCG/ML
25 SYRINGE (ML) INJECTION
Status: DISCONTINUED | OUTPATIENT
Start: 2023-10-26 | End: 2023-10-26 | Stop reason: HOSPADM

## 2023-10-26 RX ORDER — ONDANSETRON 2 MG/ML
4 INJECTION INTRAMUSCULAR; INTRAVENOUS ONCE AS NEEDED
Status: DISCONTINUED | OUTPATIENT
Start: 2023-10-26 | End: 2023-10-26 | Stop reason: HOSPADM

## 2023-10-26 RX ORDER — HYDROMORPHONE HCL IN WATER/PF 6 MG/30 ML
0.2 PATIENT CONTROLLED ANALGESIA SYRINGE INTRAVENOUS
Status: DISCONTINUED | OUTPATIENT
Start: 2023-10-26 | End: 2023-10-26 | Stop reason: HOSPADM

## 2023-10-26 RX ORDER — SODIUM CHLORIDE, SODIUM LACTATE, POTASSIUM CHLORIDE, CALCIUM CHLORIDE 600; 310; 30; 20 MG/100ML; MG/100ML; MG/100ML; MG/100ML
INJECTION, SOLUTION INTRAVENOUS CONTINUOUS PRN
Status: DISCONTINUED | OUTPATIENT
Start: 2023-10-26 | End: 2023-10-26

## 2023-10-26 RX ORDER — DILTIAZEM HYDROCHLORIDE 60 MG/1
120 TABLET, FILM COATED ORAL 2 TIMES DAILY
Status: DISCONTINUED | OUTPATIENT
Start: 2023-10-26 | End: 2023-11-01 | Stop reason: HOSPADM

## 2023-10-26 RX ADMIN — ONDANSETRON 4 MG: 2 INJECTION INTRAMUSCULAR; INTRAVENOUS at 21:48

## 2023-10-26 RX ADMIN — PROPOFOL 70 MG: 10 INJECTION, EMULSION INTRAVENOUS at 21:43

## 2023-10-26 RX ADMIN — METOPROLOL SUCCINATE 100 MG: 100 TABLET, EXTENDED RELEASE ORAL at 23:19

## 2023-10-26 RX ADMIN — INSULIN LISPRO 1 UNITS: 100 INJECTION, SOLUTION INTRAVENOUS; SUBCUTANEOUS at 23:22

## 2023-10-26 RX ADMIN — VANCOMYCIN HYDROCHLORIDE 1000 MG: 1 INJECTION, SOLUTION INTRAVENOUS at 17:51

## 2023-10-26 RX ADMIN — INSULIN LISPRO 2 UNITS: 100 INJECTION, SOLUTION INTRAVENOUS; SUBCUTANEOUS at 12:01

## 2023-10-26 RX ADMIN — FENTANYL CITRATE 25 MCG: 50 INJECTION, SOLUTION INTRAMUSCULAR; INTRAVENOUS at 21:41

## 2023-10-26 RX ADMIN — INSULIN LISPRO 3 UNITS: 100 INJECTION, SOLUTION INTRAVENOUS; SUBCUTANEOUS at 08:10

## 2023-10-26 RX ADMIN — ATORVASTATIN CALCIUM 40 MG: 40 TABLET, FILM COATED ORAL at 08:09

## 2023-10-26 RX ADMIN — ACETAMINOPHEN 975 MG: 325 TABLET, FILM COATED ORAL at 13:52

## 2023-10-26 RX ADMIN — SODIUM CHLORIDE, SODIUM LACTATE, POTASSIUM CHLORIDE, AND CALCIUM CHLORIDE: .6; .31; .03; .02 INJECTION, SOLUTION INTRAVENOUS at 21:37

## 2023-10-26 RX ADMIN — INSULIN LISPRO 2 UNITS: 100 INJECTION, SOLUTION INTRAVENOUS; SUBCUTANEOUS at 16:11

## 2023-10-26 RX ADMIN — SENNOSIDES 8.6 MG: 8.6 TABLET, FILM COATED ORAL at 08:09

## 2023-10-26 RX ADMIN — TAMSULOSIN HYDROCHLORIDE 0.4 MG: 0.4 CAPSULE ORAL at 16:13

## 2023-10-26 RX ADMIN — APIXABAN 5 MG: 5 TABLET, FILM COATED ORAL at 08:09

## 2023-10-26 RX ADMIN — METOPROLOL SUCCINATE 100 MG: 100 TABLET, EXTENDED RELEASE ORAL at 08:09

## 2023-10-26 RX ADMIN — LISINOPRIL 40 MG: 20 TABLET ORAL at 08:09

## 2023-10-26 RX ADMIN — MIDAZOLAM 1 MG: 1 INJECTION INTRAMUSCULAR; INTRAVENOUS at 21:41

## 2023-10-26 RX ADMIN — CEFTRIAXONE SODIUM 1000 MG: 10 INJECTION, POWDER, FOR SOLUTION INTRAVENOUS at 15:10

## 2023-10-26 RX ADMIN — DILTIAZEM HYDROCHLORIDE 90 MG: 60 TABLET, FILM COATED ORAL at 08:12

## 2023-10-26 RX ADMIN — DILTIAZEM HYDROCHLORIDE 120 MG: 60 TABLET, FILM COATED ORAL at 23:18

## 2023-10-26 RX ADMIN — VANCOMYCIN HYDROCHLORIDE 1000 MG: 1 INJECTION, SOLUTION INTRAVENOUS at 05:39

## 2023-10-26 RX ADMIN — MIDAZOLAM 1 MG: 1 INJECTION INTRAMUSCULAR; INTRAVENOUS at 21:37

## 2023-10-26 RX ADMIN — PROPOFOL 50 MCG/KG/MIN: 10 INJECTION, EMULSION INTRAVENOUS at 21:43

## 2023-10-26 NOTE — PROGRESS NOTES
[facility] Podiatry - Progress Note  Patient: Jaycee Menchaca. 72 y.o. male   MRN: [id number]  PCP: Ricardo Zhu MD  [facility] Encounter: [id number]  Date Of Visit: 10/26/23    ASSESSMENT:    Jaycee Menchaca. is a 72 y.o. male with:    Left diabetic foot ulceration - plantar lateral forefoot  Cellulitis of left lower extremity  Sepsis - POA  Type 2 DM with diabetic neuropathy  History of R TMA    PLAN:    Patient to go to OR today 10/26/23 with Dr. Yung Morales for a left foot washout/debridement for source control with possible delayed primary closure pending cardiac and medical clearance. Dressings changed at bedside. Clinical concern for potential underlying abscess and skin/soft tissue infection, wound continues to probe to bone and display clinical signs of infection including edema, erythema, trace purulent drainage. Confirmed NPO status with patient  Reviewed left foot MRI final read: no definite abnormal marrow signal to suggest acute osteomyelitis, findings suggest cellulitis  Appreciate vascular surgery recommendations - unlikely further vascular intervention required at this time. Will discuss operative findings with vascular team to determine if vascular intervention is indicated post operatively  Continue IV ABX per primary team  Follow up final blood cultures - negative at 24 hours  Follow up final wound cultures  Continue to trend labs/vitals. Patient is currently afebrile, slight decrease in leukocytosis. Reviewed H&P, today's labs and vitals. Patient remains stable  Continued to advise patient to stay nonweightbearing to the left lower extremity  Continue local wound care consisting of packing, DSD. Podiatry to do dressing changes at this time. Elevation on green foam wedges or pillows when non-ambulatory. Rest of care per primary team.    Weight bearing status: Nonweightbearing to left lower extremity      SUBJECTIVE:     The patient was seen, evaluated, and assessed at bedside today. The patient was awake, alert, and in no acute distress. No acute events overnight. The patient reports no pain in his left lower extremity at this time. Patient denies N/V/F/chills/SOB/CP. OBJECTIVE:     Vitals:   /78   Pulse 97   Temp 97.5 °F (36.4 °C)   Resp 16   SpO2 99%     Temp (24hrs), Av.8 °F (38.2 °C), Min:97.5 °F (36.4 °C), Max:102.1 °F (38.9 °C)      Physical Exam:     General:  Alert, cooperative, and in no distress. Lower extremity exam:  Cardiovascular status at baseline. Neurological status at baseline. Musculoskeletal status at baseline. No calf tenderness noted. Lower extremity wound(s) as noted below:  Wound #: 1  Location: plantar lateral left forefoot  Length 1.1cm: Width 0.5cm: Depth 1.5cm:   Deepest Tissue Noted in Base: bone  Probe to Bone: Yes  Peripheral Skin Description: Attached  Granulation: 20% Fibrotic Tissue: 80% Necrotic Tissue: 0%   Drainage Amount: minimal, serosanguinous  Signs of Infection: Yes - erythema, edema, probes to bone    Increase in severity of erythematous and edematous changes to dorsal left forefoot, left foot has increased temperature in comparison with contralateral limb. Clinical Images 10/26/23: Additional Data:     Labs:    Results from last 7 days   Lab Units 10/26/23  0548   WBC Thousand/uL 19.47*   HEMOGLOBIN g/dL 13.2   HEMATOCRIT % 41.4   PLATELETS Thousands/uL 185   NEUTROS PCT % 87*   LYMPHS PCT % 6*   MONOS PCT % 6   EOS PCT % 0     Results from last 7 days   Lab Units 10/26/23  0548 10/25/23  0620   POTASSIUM mmol/L 4.1 4.1   CHLORIDE mmol/L 106 105   CO2 mmol/L 21 25   BUN mg/dL 15 14   CREATININE mg/dL 0.91 0.92   CALCIUM mg/dL 8.4 9.0   ALK PHOS U/L  --  85   ALT U/L  --  20   AST U/L  --  14     Results from last 7 days   Lab Units 10/24/23  1137   INR  1.37*       * I Have Reviewed All Lab Data Listed Above.     Recent Cultures (last 7 days):     Results from last 7 days   Lab Units 10/25/23  1632 10/24/23  1205 10/24/23  1142 10/24/23  1137   BLOOD CULTURE   --   --  No Growth at 24 hrs. No Growth at 24 hrs. GRAM STAIN RESULT  No Polys or Bacteria seen  --   --   --    URINE CULTURE   --  Culture too young- will reincubate  --   --      Results from last 7 days   Lab Units 10/25/23  1632   ANAEROBIC CULTURE  Culture results to follow. Imaging: I have personally reviewed pertinent films in PACS  EKG, Pathology, and Other Studies: I have personally reviewed pertinent reports. ** Please Note: Portions of the record may have been created with voice recognition software. Occasional wrong word or "sound a like" substitutions may have occurred due to the inherent limitations of voice recognition software. Read the chart carefully and recognize, using context, where substitutions have occurred.  **

## 2023-10-26 NOTE — PLAN OF CARE
Problem: INFECTION - ADULT  Goal: Absence or prevention of progression during hospitalization  Description: INTERVENTIONS:  - Assess and monitor for signs and symptoms of infection  - Monitor lab/diagnostic results  - Monitor all insertion sites, i.e. indwelling lines, tubes, and drains  - Monitor endotracheal if appropriate and nasal secretions for changes in amount and color  - [place] appropriate cooling/warming therapies per order  - Administer medications as ordered  - Instruct and encourage patient and family to use good hand hygiene technique  - Identify and instruct in appropriate isolation precautions for identified infection/condition  Outcome: Progressing     Problem: SAFETY ADULT  Goal: Patient will remain free of falls  Description: INTERVENTIONS:  - Educate patient/family on patient safety including physical limitations  - Instruct patient to call for assistance with activity   - Consult OT/PT to assist with strengthening/mobility   - Keep Call bell within reach  - Keep bed low and locked with side rails adjusted as appropriate  - Keep care items and personal belongings within reach  - Initiate and maintain comfort rounds  - Apply yellow socks and bracelet for high fall risk patients  - Consider moving patient to room near nurses station  Outcome: Progressing     Problem: PAIN - ADULT  Goal: Verbalizes/displays adequate comfort level or baseline comfort level  Description: Interventions:  - Encourage patient to monitor pain and request assistance  - Assess pain using appropriate pain scale  - Administer analgesics based on type and severity of pain and evaluate response  - Implement non-pharmacological measures as appropriate and evaluate response  - Consider cultural and social influences on pain and pain management  - Notify physician/advanced practitioner if interventions unsuccessful or patient reports new pain  Outcome: Progressing

## 2023-10-26 NOTE — PROGRESS NOTES
INTERNAL MEDICINE RESIDENCY PROGRESS NOTE     Name: Jamey Barlow. Age & Sex: 72 y.o. male   MRN: [id number]  Unit/Bed#: [facility] 824-01   Encounter: [id number]  Team: SOD Team C     PATIENT INFORMATION     Name: Jamey Barlow. Age & Sex: 72 y.o. male   MRN: [id number]  Hospital Stay Days: 2    ASSESSMENT/PLAN     Principal Problem:    Cellulitis of left lower extremity  Active Problems:    Hyperlipidemia    Type 2 diabetes mellitus with diabetic neuropathy ([facility])    Atrial fibrillation with RVR (formerly [facility])    Essential hypertension    Benign prostatic hyperplasia with lower urinary tract symptoms    Sepsis ([address])    S/P transmetatarsal amputation of foot, right (formerly [facility])      * Cellulitis of left lower extremity  Assessment & Plan  Patient presents to ED today with 1-day of increased erythema, warmth, and pain in LLE. He states he is unsure if he sustained a puncture wound on the bottom of his foot. He thinks it's possible he may have stepped on a nail when helping his daughter renovate her home. He did endorses some blood on his sock. Patient does have significant diabetic polyneuropathy and has undergone a transmetatarsal amputation on right foot in 2021. Patient was scheduled to see podiatry tomorrow. On arrival patient met SIRS criteria with T 101.4, . Labs were significant for leukocytosis of 15.08. Lactic acid was normal. Procal negative. CRP and ESR elevated. Preliminary read of XR looks normal with no signs of osteomyelitis. S/p IV vancomycin and cefepime x1, 1 L NS, Tylenol 975 mg in ED. On physical exam, LLE had significant erythema, swelling, and warmth. Patient states pain has improved. Puncture wound noted on sole of left foot. Blood cultures still pending; per recommendation from Pharmacy, will continue IV vancomycin and will d/c IV cefepime. X-ray of the left lower extremity indicated no acute osseous abnormality.     Arterial dopplers from 10/25/23 demonstrated bilateral atherosclerotic disease of the femoral-popliteal arteries without focal stenosis. Left lower extremity great toe pressure was 52 mmHg, which was determined to be marginally within the healing range for a diabetic. Podiatry saw patient 10/25/23 for wound debridement on the plantar aspect of the fourth metatarsal. Wound probed to bone, raising concern for osteomyelitis. MRI obtained did not determine any determinate evidence of osteomyelitis, but given the clinical picture and the wound probing to bone, will empirically treat for osteomyelitis. Adding ceftriaxone to current abx regimen. Patient will go to OR for a left foot washout and debridement for source control 10/26/23. Risk Factor Score (Yes=1, No=0)   Hx of TIA/CVA 0   Hx of prior ischemic heart disease (AMI, unstable angina, Q waves on EKG, CABG) 0   Hx of congestive heart failure 0   Serum Creatinine >2 mg/dl 0   Insulin dependent diabetes mellitus 1   Total Score 1     Risk of MACE (30-day)     Points Risk % (95% CI), Ammy, 2017 Risk % (95% CI) Kris, 1999   0 3.9 (2.8-5.4) 0.4 (0.05-1.5)   1 6 (4.9-7.4) 0.9 (0.3-2.1)   2 10.1 (8.1-12.6) 6.6 (3.9-10.3)   3 or more 15 (11.1-20) >11 (5.8-18. 4)       Advice: due to RCRI score 1, patient has a 3.9% 30-day risk of death, MI, or cardiac arrest or other MACE associated with this operation/procedure. The procedure is not considered high/elevated risk. Will plan to continue beta block and CCB therapy periprocedure given ongoing a-fib. Will hold Eliquis and lisinopril today. Final decision of whether or not the patient should undergo the procedure in question remains with the physician performing the procedure.       Plan:  -Continue IV Vancomycin 1g q12h  -Add ceftriaxone 1 g IV q24 hours  -Follow-up pending blood cultures and wound cultures - negative at 24 hours x2 [facility]), other cultures pending  -Surgical washout/debridement of left foot scheduled for 10/26/23  -Continue metoprolol/CCB as above; hold Eliquis and lisinopril in setting of procedure  -TD given  -Monitor WBC and fever curves   -Tylenol PRN for fever, pain  -Podiatry on board in hospital evaluation and management  -Outpatient follow-up with podiatry recommended. S/P transmetatarsal amputation of foot, right ([address])  Assessment & Plan  Stable    Sepsis ([address])  Assessment & Plan  On arrival patient met SIRS criteria with T 101.4, . Labs were significant for leukocytosis of 15.08. Lactic acid was normal. Procal negative. CRP and ESR elevated. Preliminary read of XR looks normal with no signs of osteomyelitis. S/p IV vancomycin and cefepime x1, 1 L NS, Tylenol 975 mg in ED. Likely 2/2 to cellulitis of left lower extremity. UA significant for large leukocytes, 1+ protein, >1000 glucose, innumerable WBC. Negative nitrites. Urine culture pending. Endorsed some urinary frequency. Of note, podiatry evaluated patient 10/25/23 and debrided the wound on the plantar aspect of the fourth metatarsal. Wound was probed to bone, raising concern for osteomyelitis. MRI obtained on 10/25/23 demonstrated no definite presence of osteomyelitis. Considering the patient's clinical picture and the wound probing to bone, will empirically treat for osteomyelitis. As of 10/26/23, blood cultures, MRSA swab, and urine cultures still pending though have been negative thus far. Patient has continued to spike fevers over night and this morning. Will add ceftriaxone to abx regimen for appropriate gram negative coverage given concern for osteomyelitis. Patient will go for surgical washout/debridement of the wound on the left foot 10/26/23.     Plan:  -Follow-up blood cultures, urine culture, MRSA  -Continue IV Vanc 1g q12h, per ID  -Will add 1 g ceftriaxone IV q 24 hours  -Surgical washout/debridement scheduled for 10/26/23  -Trend WBC, fever curves  -Tylenol PRN for fever, pain  -See plan under "cellulitis of left lower extremity"    Benign prostatic hyperplasia with lower urinary tract symptoms  Assessment & Plan  Plan:  -Continue home Flomax 0.4 mg daily      Essential hypertension  Assessment & Plan  /72 on arrival, down to 132/77. Home regimen: Cardizem 90 mg BID, Lisinopril 40 mg daily, Metoprolol XL 75 mg BID. Plan:  -Continue to monitor BP  -Given upcoming surgical intervention, will hold lisinopril as of 10/26/23  -Increased Cardizem to 120 mg BID for heart rate control  -Continue metoprolol XL dose to 100 mg BID for heart rate control    Atrial fibrillation with RVR ([facility])  Assessment & Plan  EKG on arrival significant for "Afib RVR, incomplete RBBB, septal infarct (age undetermined). " Home regimen includes: Cardizem 90 mg BID, Eliquis 5 mg BID, Metoprolol 75 mg BID. Tachycardia partially 2/2 sepsis due to cellulitis. On 10/25/23, patient went into Afib with heart rates ranging in the high 90s to 130s. Per nursing, patient had heart rates as high as in the 160s last night. Metoprolol dose was increased to 100 mg BID on 10/25/23. Patient continued to go in and out of atrial fibrillation, with heart rates up to 130s and 140s. Will plan to increase cardizem dose to 120 mg BID for better rate control. Plan:  -Increase Cardizem dose to 120 mg BID  -Continue home Eliquis 5 mg BID, [id number] score 5; will hold for now in setting of pending OR trip this evening  -Continue Metoprolol to 100 mg BID  -Monitor on telemetry       Type 2 diabetes mellitus with diabetic neuropathy [facility])  Assessment & Plan  Lab Results   Component Value Date    HGBA1C 9.8 (H) 10/25/2023       Recent Labs     10/25/23  1619 10/25/23  2240 10/26/23  0750 10/26/23  1109   POCGLU 222* 217* 232* 218*       Blood Sugar Average: Last 72 hrs:    Patient states he currently uses Lantus 41 units PM, Metformin 500 mg BID - on further review of his chart, this does not appear to be accurate.     Plan:  -Monitor BG  -Start on SSI, but will plan to adjust as needed pending further BG readings - will continue with current management at present pending procedure this evening, but will likely transition to basal/bolus regimen tomorrow to provide better control  -Carb-controlled diet   (P) 215.4      Hyperlipidemia  Assessment & Plan  Plan:  -Continue home Lipitor 40 mg daily         Disposition: Remain admitted for further IV antibiotic therapy as well as ongoing discussions regarding conservative vs. surgical management of left foot wound     SUBJECTIVE     Patient seen and examined. No acute events overnight. Patient reports that he has similar mild leg pain to yesterday and rates this pain a 2 out of 10. Patient reports that his leg still feels warm, but not as warm as it did yesterday. He also reports that he has no pain associated with the wound on the plantar aspect of the left fourth metatarsal due to his diabetic neuropathy. He denies any chest pain, shortness of breath, cough, congestion, nausea, vomiting, or diarrhea. He reports he feels a little warm, and that he had a fever last night, but he denies having any chills. Patient denies headache, dizziness, or palpitations. OBJECTIVE     Vitals:    10/26/23 0752 10/26/23 0753 10/26/23 1112 10/26/23 1348   BP: 121/69 121/69 130/71    Pulse:  (!) 108 92 93   Resp: 18 17 16    Temp: (!) 100.7 °F (38.2 °C) (!) 100.7 °F (38.2 °C) (!) 101.4 °F (38.6 °C) 99.8 °F (37.7 °C)   TempSrc:       SpO2:  99% 97% 96%      Temperature:   Temp (24hrs), Av.7 °F (38.2 °C), Min:97.5 °F (36.4 °C), Max:102.1 °F (38.9 °C)    Temperature: 99.8 °F (37.7 °C)  Intake & Output:  I/O         10/24 0701  10/25 0700 10/25 0701  10/26 07    P. O.  660    IV Piggyback 1303.3 230    Total Intake 1303.3 890    Urine 370 300    Total Output 370 300    Net +933.3 +590          Unmeasured Urine Occurrence  2 x          Weights: There is no height or weight on file to calculate BMI.   Weight (last 2 days)       None          Physical Exam  Constitutional:       General: He is not in acute distress. Appearance: He is not toxic-appearing. HENT:      Head: Normocephalic and atraumatic. Right Ear: External ear normal.      Left Ear: External ear normal.      Nose: Nose normal.      Mouth/Throat:      Mouth: Mucous membranes are moist.      Pharynx: Oropharynx is clear. Eyes:      Extraocular Movements: Extraocular movements intact. Conjunctiva/sclera: Conjunctivae normal.      Pupils: Pupils are equal, round, and reactive to light. Cardiovascular:      Rate and Rhythm: Tachycardia present. Rhythm irregularly irregular. Heart sounds: No murmur heard. No friction rub. No gallop. Pulmonary:      Effort: Pulmonary effort is normal. No accessory muscle usage, respiratory distress or retractions. Breath sounds: Normal breath sounds. No wheezing, rhonchi or rales. Abdominal:      General: Abdomen is flat. Bowel sounds are normal. There is no distension. Palpations: Abdomen is soft. Tenderness: There is no abdominal tenderness. Musculoskeletal:      Cervical back: Normal range of motion. Right lower leg: No edema. Left lower leg: Edema present. Feet:    Feet:      Comments: Transmetatarsal amputation of the right foot  Skin:     General: Skin is warm and dry. Capillary Refill: Capillary refill takes less than 2 seconds. Coloration: Skin is not jaundiced. Findings: Erythema (left lower extremity from the medial aspect of the ankle up to the thigh associated with warmth and swelling) present. Neurological:      Mental Status: He is alert and oriented to person, place, and time. Mental status is at baseline. Psychiatric:         Mood and Affect: Mood normal.         Behavior: Behavior normal.       LABORATORY DATA     Labs: I have personally reviewed pertinent reports.   Results from last 7 days   Lab Units 10/26/23  0548 10/25/23  0620 10/24/23  1137   WBC Thousand/uL 19.47* 21.81* 15.08*   HEMOGLOBIN g/dL 13.2 15.2 14.7   HEMATOCRIT % 41.4 47.7 44.8   PLATELETS Thousands/uL 185 204 224   NEUTROS PCT % 87* 83* 84*   MONOS PCT % 6 7 7   EOS PCT % 0 1 1      Results from last 7 days   Lab Units 10/26/23  0548 10/25/23  0620 10/24/23  1137   POTASSIUM mmol/L 4.1 4.1 4.4   CHLORIDE mmol/L 106 105 104   CO2 mmol/L 21 25 24   BUN mg/dL 15 14 19   CREATININE mg/dL 0.91 0.92 0.94   CALCIUM mg/dL 8.4 9.0 9.1   ALK PHOS U/L  --  85 89   ALT U/L  --  20 17   AST U/L  --  14 13              Results from last 7 days   Lab Units 10/24/23  1137   INR  1.37*   PTT seconds 33     Results from last 7 days   Lab Units 10/24/23  1137   LACTIC ACID mmol/L 1.0           IMAGING & DIAGNOSTIC TESTING     Radiology Results: I have personally reviewed pertinent reports. MRI foot/forefoot toes left w wo contrast    Result Date: 10/26/2023  Impression: Inhomogeneous fat suppression about the distal lateral foot. Motion degradation. Within the limitations of the examination, no definite abnormal marrow signal to suggest acute osteomyelitis. Findings suggesting cellulitis along the dorsum of the foot without abscess. Additional callus with ulceration along the plantar aspect of the fourth metatarsal head. Workstation performed: [id number]     XR foot 3+ views LEFT    Result Date: 10/25/2023  Impression: No acute osseous abnormality. Workstation performed: [id number]     Other Diagnostic Testing: I have personally reviewed pertinent reports.     ACTIVE MEDICATIONS     Current Facility-Administered Medications   Medication Dose Route Frequency    acetaminophen (TYLENOL) tablet 975 mg  975 mg Oral Q6H PRN    atorvastatin (LIPITOR) tablet 40 mg  40 mg Oral Daily    cefTRIAXone (ROCEPHIN) 1,000 mg in dextrose 5 % 50 mL IVPB  1,000 mg Intravenous Q24H    diltiazem (CARDIZEM) tablet 120 mg  120 mg Oral BID    insulin lispro (HumaLOG) 100 units/mL subcutaneous injection 1-6 Units  1-6 Units Subcutaneous TID AC    insulin lispro (HumaLOG) 100 units/mL subcutaneous injection 1-6 Units  1-6 Units Subcutaneous HS    metoprolol succinate (TOPROL-XL) 24 hr tablet 100 mg  100 mg Oral BID    polyethylene glycol (MIRALAX) packet 17 g  17 g Oral Daily PRN    senna (SENOKOT) tablet 8.6 mg  1 tablet Oral Daily    tamsulosin (FLOMAX) capsule 0.4 mg  0.4 mg Oral Daily With Dinner    vancomycin (VANCOCIN) IVPB (premix in dextrose) 1,000 mg 200 mL  1,000 mg Intravenous Q12H       VTE Pharmacologic Prophylaxis: Eliquis  VTE Mechanical Prophylaxis: sequential compression device    Portions of the record may have been created with voice recognition software. Occasional wrong word or "sound a like" substitutions may have occurred due to the inherent limitations of voice recognition software. Read the chart carefully and recognize, using context, where substitutions have occurred.   ==  Shubham Nelson MD  [id number] [facility]  Internal Medicine Residency PGY-1

## 2023-10-26 NOTE — CONSULTS
Consult Note - Vascular Surgery   Kendrick Woodruff. 72 y.o. male MRN: [id number]    Unit/Bed#: [facility] 824-01 Encounter: [id number]    Assessment:  72year old male with RLE transmetatarsal amputation, left diabetic foot ulceration on plantar lateral forefoot, cellulitis of LLE, sepsis, Type 2 DM with diabetic neuropathy. No streaking or crepitus of left foot. Consulted placed for GTP of 52, within healing range (marginally). Plan:  Bedside washout performed today by podiatry, continue treatment per podiatry. Unlikely further vascular intervention required at this time. MRI foot pending. Consulting Service: [facility]    Chief Complaint: left foot pain/infection. HPI: Kendrick Baptiste is a 72 y.o. male who presents with left foot cellulitis admitted on 10/24/2023. Patient states that approximately 5 days ago he noticed that his left leg was starting to get red and swollen. Patient is unsure whether he had a puncture wound to the left foot as he states that he may have stepped on a nail when visiting his daughter's house which is under construction. Upon inspection of his boot, no puncture wound was noted. However, patient noted that when he was changing his socks he also noted red drainage/blood. He was supposed to have an appointment with his podiatrist, Dr. Alonzo Espinosa, but he called the office informing them of the discoloration to his left leg as well as the drainage and they informed him to present to the emergency room. He states that he is unsure how he obtained the wound in his left plantar foot,     The patient is resting comfortably and in no acute distress, does report fevers but denies chills, nausea, vomiting, chest pain, shortness of breath. PMH is remarkable for type 2 DM with diabetic neuropathy, hyperlipidemia, hypertension, atrial fibrillation with RVR, right transmetatarsal amputation.         Review of Systems:  General: negative for - chills, fatigue, or fever  Cardiovascular: no chest pain or dyspnea on exertion  Respiratory: no cough, shortness of breath, or wheezing  Gastrointestinal: no abdominal pain, change in bowel habits, or black or bloody stools  Genitourinary ROS: no dysuria, trouble voiding, or hematuria  Musculoskeletal ROS: positive for - muscle pain and muscular weakness  Neurological ROS: no TIA or stroke symptoms  Hematological and Lymphatic ROS: negative  Dermatological ROS: negative  Psychological ROS: negative  Ophthalmic ROS: negative  ENT ROS: negative    Past Medical History:  Past Medical History:   Diagnosis Date    Arthritis     Atrial fibrillation ([address])     Diagnosed 11/2018    Benign prostate hyperplasia 04/2002    Cellulitis     right lower leg     Colon polyp 2006    Diabetes mellitus ([address])     Hearing aid worn     bilat    Hearing loss     90% loss left ear and 40% right ear    History of clubfoot     "since birth"    History of pneumonia     History of TIA (transient ischemic attack) 04/25/2017 11/30/18 pt denies    Hyperlipidemia 5/15/2014    Hypertension     Infectious viral hepatitis     "cant remember type"    Irregular heart beat     Afib    Neuropathy     both feet    Osteomyelitis ([address])     right great toe    Pneumonia     Right middle lobe pulmonary nodule 11/6/2018    Seasickness     Teeth missing     Type 2 diabetes mellitus with diabetic neuropathy ([address]) 11/6/2018    Wears glasses     reading    Wound, open     bottom of right foot       Past Surgical History:  Past Surgical History:   Procedure Laterality Date    BUNIONECTOMY Right 12/4/2018    Procedure: 5TH METATARSAL BONE PARTIAL RESECTION, FULL THICKNESS DEBRIDEMENT OF DIABETIC ULCER;  Surgeon: Jaky Fenton DPM;  [facility];  Service: Podiatry    CLUB FOOT RELEASE Bilateral     COLONOSCOPY      COMPLEX WOUND CLOSURE TO EXTREMITY  4/27/2021    Procedure: COMPLEX WOUND CLOSURE TO EXTREMITY: RIGHT FOOT;  Surgeon: Sintia Gonzalez MD;  [facility];  Service: Plastics    EXTERNAL FIXATOR APPLICATION Right 6/82/1198    Procedure: Application multiplane external fixation;  Surgeon: Debbie Conner DPM;  [facility];  Service: Podiatry    FOOT HARDWARE REMOVAL Right 2/17/2021    Procedure: REMOVAL EXTERNAL FIXATOR;  Surgeon: Debbie Conner DPM;  [facility];  Service: Podiatry    FOREIGN BODY REMOVAL Right 4/27/2021    Procedure: REMOVAL FOREIGN BODY EXTREMITY: RIGHT FOOT;  Surgeon: Eliel Luz MD;  [facility];  Service: Plastics    INCISION AND DRAINAGE OF WOUND Right 2/17/2021    Procedure: INCISION AND DRAINAGE (I&D) EXTREMITY;  Surgeon: Debbie Conner DPM;  [facility];  Service: Podiatry    ORIF FOOT FRACTURE Right 3/4/2021    Procedure: VAC PLACEMENT; SCREW FIXATION RIGHT FOOT FUSION;  Surgeon: Debbie Conner DPM;  [facility];  Service: Podiatry    ND AMPUTATION FOOT TRANSMETARSAL Right 2/22/2021    Procedure: AMPUTATION TRANSMETATARSAL (TMA), REMOVAL NAIL IM T2 ICF HARDWARE;  Surgeon: Debbie Conner DPM;  [facility];  Service: Podiatry    ND ARTHRD MIDTARSL/TARS MLT/TRANSVRS W/OSTEOT Right 2/12/2021    Procedure: foot ARTHRODESIS/FUSION;  Surgeon: Debbie Conner DPM;  [facility];  Service: Podiatry    ND DEBRIDEMENT BONE MUSCLE &/FASCIA 20 SQ CM/< Right 12/2/2021    Procedure: DEBRIDEMENT OF TARSAL BONES WITH Ulysess Flavors;  Surgeon: Debbie Conner DPM;  [facility];  Service: Podiatry    ND LNGTH/SHRT TENDON LEG/ANKLE 1 TENDON SPX Right 2/12/2021    Procedure: LENGTHEN TIBIAL TENDON, TRANS HEEL CORD;  Surgeon: Debbie Conner DPM;  [facility];  Service: Podiatry    ND MUSC MYOCUTANEOUS/FASCIOCUTANEOUS FLAP TRUNK Right 4/12/2021    Procedure: RECONSTRUCTION MICROSURGICAL W/ FREE FLAP;  Surgeon: Eliel Luz MD;  [facility];  Service: Plastics    ND MUSC MYOCUTANEOUS/FASCIOCUTANEOUS FLAP TRUNK Right 4/12/2021    Procedure: TAKEBACK RECONSTRUCTION MICROSURGICAL W/ FREE FLAP;  Surgeon: Mohit Rodríguez MD;  [facility];  Service: Plastics    CA MUSC MYOCUTANEOUS/FASCIOCUTANEOUS FLAP TRUNK Right 4/13/2021    Procedure: RECONSTRUCTION MICROSURGICAL W/ FREE FLAP, RE EXPLORATION, MICRO VASCULAR REVISION;  Surgeon: Mohit Rodríguez MD;  [facility];  Service: Plastics    CA OSTC PRTL EXOSTC/CONDYLC METAR HEAD Right 12/29/2020    Procedure: EXCISION EXOSTOSIS;  Surgeon: Joshua Segal DPM;  [facility];  Service: Podiatry    CA SECONDARY CLOSURE SURG WOUND/DEHSN EXTSV/COMPLIC Right 59/78/4521    Procedure: PRIMARY DELAYED CLOSURE;  Surgeon: Joshua Segal DPM;  [facility];  Service: Podiatry    TOE AMPUTATION Right     partial great toe    TONSILLECTOMY      VAC DRESSING APPLICATION Right 0/8/7215    Procedure: APPLICATION VAC DRESSING EXTREMITY;  Surgeon: Mohit Rodríguez MD;  [facility];  Service: [address] DEBRIDEMENT Right 3/1/2021    Procedure: DEBRIDEMENT LOWER EXTREMITY [facility] OUT); Surgeon: Mohit Rodríguez MD;  [facility];  Service: Plastics    WOUND DEBRIDEMENT Right 4/7/2021    Procedure: DEBRIDEMENT RIGHT FOOT;  Surgeon: Mohit Rodríguez MD;  [facility];  Service: Plastics    WOUND DEBRIDEMENT Right 4/12/2021    Procedure: DEBRIDEMENT LOWER EXTREMITY [facility] OUT);   Surgeon: Mohit Rodríguez MD;  [facility];  Service: Plastics    WOUND DEBRIDEMENT Right 4/27/2021    Procedure: DEBRIDEMENT LOWER EXTREMITY [facility] OUT): RIGHT FOOT;  Surgeon: Mohit Rodríguez MD;  [facility];  Service: Plastics       Social History:  Social History     Substance and Sexual Activity   Alcohol Use Yes    Comment: occasionally     Social History     Substance and Sexual Activity   Drug Use Not Currently     Social History     Tobacco Use   Smoking Status Former    Types: Cigarettes    Quit date: 11/7/1988 Years since quittin.9   Smokeless Tobacco Former   Tobacco Comments    exposure to passive smoke       Family History:  Family History   Problem Relation Age of Onset    Diabetes Father         mellitus       Allergies: Allergies   Allergen Reactions    Simvastatin Myalgia    Itraconazole Other (See Comments)     Pt denies knowledge of allergy. .        Medications:  Current Facility-Administered Medications   Medication Dose Route Frequency    acetaminophen (TYLENOL) tablet 975 mg  975 mg Oral Q6H PRN    apixaban (ELIQUIS) tablet 5 mg  5 mg Oral BID    atorvastatin (LIPITOR) tablet 40 mg  40 mg Oral Daily    diltiazem (CARDIZEM) tablet 90 mg  90 mg Oral BID    insulin lispro (HumaLOG) 100 units/mL subcutaneous injection 1-6 Units  1-6 Units Subcutaneous TID AC    insulin lispro (HumaLOG) 100 units/mL subcutaneous injection 1-6 Units  1-6 Units Subcutaneous HS    lisinopril (ZESTRIL) tablet 40 mg  40 mg Oral Daily    metoprolol succinate (TOPROL-XL) 24 hr tablet 100 mg  100 mg Oral BID    polyethylene glycol (MIRALAX) packet 17 g  17 g Oral Daily PRN    senna (SENOKOT) tablet 8.6 mg  1 tablet Oral Daily    tamsulosin (FLOMAX) capsule 0.4 mg  0.4 mg Oral Daily With Dinner    vancomycin (VANCOCIN) IVPB (premix in dextrose) 1,000 mg 200 mL  1,000 mg Intravenous Q12H       Vitals:  /72   Pulse (!) 108   Temp (!) 101.4 °F (38.6 °C)   Resp 20   SpO2 97%     I/Os:  I/O last 24 hours:   In: 660 [P.O.:660]  Out: 370 [Urine:370]    Lab Results and Cultures:   Lab Results   Component Value Date    WBC 21.81 (H) 10/25/2023    HGB 15.2 10/25/2023    HCT 47.7 10/25/2023    MCV 99 (H) 10/25/2023     10/25/2023     Lab Results   Component Value Date    GLUCOSE 282 (H) 2021    CALCIUM 9.0 10/25/2023     2018    K 4.1 10/25/2023    CO2 25 10/25/2023     10/25/2023    BUN 14 10/25/2023    CREATININE 0.92 10/25/2023     Lab Results   Component Value Date    INR 1.37 (H) 10/24/2023    INR 1.32 (H) 04/13/2021    INR 1.25 (H) 04/12/2021    PROTIME 16.7 (H) 10/24/2023    PROTIME 16.4 (H) 04/13/2021    PROTIME 15.7 (H) 04/12/2021       Lipid Panel: No results found for: "CHOL",     Blood Culture:   Lab Results   Component Value Date    BLOODCX No Growth at 24 hrs. 10/24/2023   ,   Urinalysis:   Lab Results   Component Value Date    COLORU Yellow 10/24/2023    CLARITYU Turbid 10/24/2023    SPECGRAV 1.022 10/24/2023    PHUR 7.5 10/24/2023    LEUKOCYTESUR Large (A) 10/24/2023    NITRITE Negative 10/24/2023    GLUCOSEU >=1000 (1%) (A) 10/24/2023    KETONESU Negative 10/24/2023    BILIRUBINUR Negative 10/24/2023    BLOODU Negative 10/24/2023   ,   Urine Culture:   Lab Results   Component Value Date    URINECX Culture too young- will reincubate 10/24/2023   ,   Wound Culure:   Lab Results   Component Value Date    WOUNDCULT 1+ Growth of Klebsiella pneumoniae ESBL (A) 12/02/2021    WOUNDCULT 2+ Growth of Staphylococcus aureus (A) 12/02/2021       Imaging:      Physical Exam:  Preliminary LEADS 10/25/2023:    CONCLUSION:  RIGHT LOWER LIMB:  TRANSMETATARSAL AMPUTATION:  Significant diffuse disease noted throughout the femoral-popliteal arteries  without significant focal stenosis. Ankle/Brachial index:   0.83. which is in the moderate disease category. PVR/ PPG tracings are dampened. LEFT LOWER LIMB:  Significant diffuse disease noted throughout the femoral-popliteal arteries  without significant focal stenosis. Ankle/Brachial index:  0.79,  which is in the moderate disease category. PVR/ PPG tracings are dampened/hyperemic. Metatarsal pressure of  127 mmHg  Great toe pressure of 52 mmHg, marginally within the healing range for a  diabetic. Tech note: Patient has a brachial blood pressure gradient of 24 mmHg (left >  right).     General appearance: alert and oriented, in no acute distress  Skin: Skin color, texture, turgor normal. No rashes or lesions  Neurologic: Grossly normal  Head: Normocephalic, without obvious abnormality, atraumatic  Eyes: conjunctivae/corneas clear. PERRL, EOM's intact. Fundi benign. Lungs: No respiratory distress  Heart: irregular rhythm   Abdomen: soft, NT   Extremities:     Wound/Incision:  Wound probed by pods today, pic on chart. Dressing intact, no saturation.      Pulse exam:  Femoral: Right: 2+ Left: 2+  DP: Right: doppler signal Left: doppler signal  PT: Right: doppler signal Left: doppler signal      He Bellamy PA-C  10/25/2023

## 2023-10-26 NOTE — QUICK NOTE
I called the patient's spouse to update them. All questions and concerns answered.     Hilda Hedrick D.O.  PGY-3 Internal Medicine   [address]

## 2023-10-26 NOTE — UTILIZATION REVIEW
ATTACHED IS MRI left foot/forefoot RESULT FROM 10/25: Inhomogeneous fat suppression about the distal lateral foot. Motion degradation. Within the limitations of the examination, no definite abnormal marrow signal to suggest acute osteomyelitis. Findings suggesting cellulitis along the dorsum of the foot without abscess. Additional callus with ulceration along the plantar aspect of the fourth metatarsal head. VAS lower limb arterial duplex 10/25:  CONCLUSION:     Impression:  RIGHT LOWER LIMB:  TRANSMETATARSAL AMPUTATION:  Atherosclerotic diffuse disease throughout the femoral-popliteal arteries  without significant focal stenosis. Ankle/Brachial index:   0.83. which is in the moderate disease category. PVR/ PPG tracings are dampened. LEFT LOWER LIMB:  Atherosclerotic diffuse disease throughout the femoral-popliteal arteries  without significant focal stenosis. Ankle/Brachial index:  0.79,  which is in the moderate disease category. PVR/ PPG tracings are dampened/hyperemic. Metatarsal pressure of  127 mmHg  Great toe pressure of 52 mmHg, marginally within the healing range for a  diabetic.

## 2023-10-26 NOTE — PROGRESS NOTES
Salvador Keys is a 72 y.o. male who is currently ordered Vancomycin IV with management by the Pharmacy Consult service. Relevant clinical data and objective / subjective history reviewed. Vancomycin Assessment:  Indication and Goal AUC/Trough: Soft tissue (goal -600, trough >10), -600, trough >10  Clinical Status: stable  Micro:   MRSA culture pending  Wound culture and gram stain: no growth at 24hrs  Anaerobic culture and gram stain: culture results to follow  Urine culture: culture results to follow  Blood cultures : NGTD  Renal Function:  SCr: 0.91 mg/dL  CrCl: 91.4 mL/min  Renal replacement: HD  Days of Therapy: 3  Current Dose: 1000 mg Q12H  Vancomycin Plan:  New Dosin mg Q12H  Estimated AUC: 433 mcg*hr/mL  Estimated Trough: 15.1 mcg/mL  Next Level: 1027 AM draw  Renal Function Monitoring: Daily BMP and [place] will continue to follow closely for s/sx of nephrotoxicity, infusion reactions and appropriateness of therapy. BMP and CBC will be ordered per protocol. We will continue to follow the patient’s culture results and clinical progress daily.     Verito Laird, Pharmacist

## 2023-10-26 NOTE — PLAN OF CARE
Problem: PAIN - ADULT  Goal: Verbalizes/displays adequate comfort level or baseline comfort level  Description: Interventions:  - Encourage patient to monitor pain and request assistance  - Assess pain using appropriate pain scale  - Administer analgesics based on type and severity of pain and evaluate response  - Implement non-pharmacological measures as appropriate and evaluate response  - Consider cultural and social influences on pain and pain management  - Notify physician/advanced practitioner if interventions unsuccessful or patient reports new pain  Outcome: Progressing     Problem: INFECTION - ADULT  Goal: Absence or prevention of progression during hospitalization  Description: INTERVENTIONS:  - Assess and monitor for signs and symptoms of infection  - Monitor lab/diagnostic results  - Monitor all insertion sites, i.e. indwelling lines, tubes, and drains  - Monitor endotracheal if appropriate and nasal secretions for changes in amount and color  - [place] appropriate cooling/warming therapies per order  - Administer medications as ordered  - Instruct and encourage patient and family to use good hand hygiene technique  - Identify and instruct in appropriate isolation precautions for identified infection/condition  Outcome: Progressing  Goal: Absence of fever/infection during neutropenic period  Description: INTERVENTIONS:  - Monitor WBC    Outcome: Progressing     Problem: SAFETY ADULT  Goal: Patient will remain free of falls  Description: INTERVENTIONS:  - Educate patient/family on patient safety including physical limitations  - Instruct patient to call for assistance with activity   - Consult OT/PT to assist with strengthening/mobility   - Keep Call bell within reach  - Keep bed low and locked with side rails adjusted as appropriate  - Keep care items and personal belongings within reach  - Initiate and maintain comfort rounds  - Make Fall Risk Sign visible to staff  - Offer Toileting every 2 Hours, in advance of need  - Apply yellow socks and bracelet for high fall risk patients  - Consider moving patient to room near nurses station  Outcome: Progressing  Goal: Maintain or return to baseline ADL function  Description: INTERVENTIONS:  -  Assess patient's ability to carry out ADLs; assess patient's baseline for ADL function and identify physical deficits which impact ability to perform ADLs (bathing, care of mouth/teeth, toileting, grooming, dressing, etc.)  - Assess/evaluate cause of self-care deficits   - Assess range of motion  - Assess patient's mobility; develop plan if impaired  - Assess patient's need for assistive devices and provide as appropriate  - Encourage maximum independence but intervene and supervise when necessary  - Involve family in performance of ADLs  - Assess for home care needs following discharge   - Consider OT consult to assist with ADL evaluation and planning for discharge  - Provide patient education as appropriate  Outcome: Progressing  Goal: Maintains/Returns to pre admission functional level  Description: INTERVENTIONS:  - Perform BMAT or MOVE assessment daily.   - Set and communicate daily mobility goal to care team and patient/family/caregiver. - Collaborate with rehabilitation services on mobility goals if consulted  - Perform Range of Motion 3 times a day. - Reposition patient every 2 hours.   - Dangle patient 3 times a day  - Stand patient 3 times a day  - Ambulate patient 3 times a day  - Out of bed to chair 3 times a day   - Out of bed for meals 3 times a day  - Out of bed for toileting  - Record patient progress and toleration of activity level   Outcome: Progressing     Problem: DISCHARGE PLANNING  Goal: Discharge to home or other facility with appropriate resources  Description: INTERVENTIONS:  - Identify barriers to discharge w/patient and caregiver  - Arrange for needed discharge resources and transportation as appropriate  - Identify discharge learning needs (meds, wound care, etc.)  - Arrange for interpretive services to assist at discharge as needed  - Refer to Case Management Department for coordinating discharge planning if the patient needs post-hospital services based on physician/advanced practitioner order or complex needs related to functional status, cognitive ability, or social support system  Outcome: Progressing     Problem: Knowledge Deficit  Goal: Patient/family/caregiver demonstrates understanding of disease process, treatment plan, medications, and discharge instructions  Description: Complete learning assessment and assess knowledge base. Interventions:  - Provide teaching at level of understanding  - Provide teaching via preferred learning methods  Outcome: Progressing     Problem: Prexisting or High Potential for Compromised Skin Integrity  Goal: Skin integrity is maintained or improved  Description: INTERVENTIONS:  - Identify patients at risk for skin breakdown  - Assess and monitor skin integrity  - Assess and monitor nutrition and hydration status  - Monitor labs   - Assess for incontinence   - Turn and reposition patient  - Assist with mobility/ambulation  - Relieve pressure over bony prominences  - Avoid friction and shearing  - Provide appropriate hygiene as needed including keeping skin clean and dry  - Evaluate need for skin moisturizer/barrier cream  - Collaborate with interdisciplinary team   - Patient/family teaching  - Consider wound care consult   Outcome: Progressing     Problem: Nutrition/Hydration-ADULT  Goal: Nutrient/Hydration intake appropriate for improving, restoring or maintaining nutritional needs  Description: Monitor and assess patient's nutrition/hydration status for malnutrition. Collaborate with interdisciplinary team and initiate plan and interventions as ordered. Monitor patient's weight and dietary intake as ordered or per policy. Utilize nutrition screening tool and intervene as necessary.  Determine patient's food preferences and provide high-protein, high-caloric foods as appropriate. INTERVENTIONS:  - Monitor oral intake, urinary output, labs, and treatment plans  - Assess nutrition and hydration status and recommend course of action  - Evaluate amount of meals eaten  - Assist patient with eating if necessary   - Allow adequate time for meals  - Recommend/ encourage appropriate diets, oral nutritional supplements, and vitamin/mineral supplements  - Order, calculate, and assess calorie counts as needed  - Recommend, monitor, and adjust tube feedings and TPN/PPN based on assessed needs  - Assess need for intravenous fluids  - Provide specific nutrition/hydration education as appropriate  - Include patient/family/caregiver in decisions related to nutrition  Outcome: Progressing     Problem: MOBILITY - ADULT  Goal: Maintain or return to baseline ADL function  Description: INTERVENTIONS:  -  Assess patient's ability to carry out ADLs; assess patient's baseline for ADL function and identify physical deficits which impact ability to perform ADLs (bathing, care of mouth/teeth, toileting, grooming, dressing, etc.)  - Assess/evaluate cause of self-care deficits   - Assess range of motion  - Assess patient's mobility; develop plan if impaired  - Assess patient's need for assistive devices and provide as appropriate  - Encourage maximum independence but intervene and supervise when necessary  - Involve family in performance of ADLs  - Assess for home care needs following discharge   - Consider OT consult to assist with ADL evaluation and planning for discharge  - Provide patient education as appropriate  Outcome: Progressing  Goal: Maintains/Returns to pre admission functional level  Description: INTERVENTIONS:  - Perform BMAT or MOVE assessment daily.   - Set and communicate daily mobility goal to care team and patient/family/caregiver.    - Collaborate with rehabilitation services on mobility goals if consulted  - Perform Range of Motion 3 times a day. - Reposition patient every 2 hours.   - Dangle patient 3 times a day  - Stand patient 3 times a day  - Ambulate patient 3 times a day  - Out of bed to chair 3 times a day   - Out of bed for meals 3 times a day  - Out of bed for toileting  - Record patient progress and toleration of activity level   Outcome: Progressing

## 2023-10-26 NOTE — PLAN OF CARE
Problem: PAIN - ADULT  Goal: Verbalizes/displays adequate comfort level or baseline comfort level  Description: Interventions:  - Encourage patient to monitor pain and request assistance  - Assess pain using appropriate pain scale  - Administer analgesics based on type and severity of pain and evaluate response  - Implement non-pharmacological measures as appropriate and evaluate response  - Consider cultural and social influences on pain and pain management  - Notify physician/advanced practitioner if interventions unsuccessful or patient reports new pain  Outcome: Progressing     Problem: INFECTION - ADULT  Goal: Absence or prevention of progression during hospitalization  Description: INTERVENTIONS:  - Assess and monitor for signs and symptoms of infection  - Monitor lab/diagnostic results  - Monitor all insertion sites, i.e. indwelling lines, tubes, and drains  - Monitor endotracheal if appropriate and nasal secretions for changes in amount and color  - Decker appropriate cooling/warming therapies per order  - Administer medications as ordered  - Instruct and encourage patient and family to use good hand hygiene technique  - Identify and instruct in appropriate isolation precautions for identified infection/condition  Outcome: Progressing  Goal: Absence of fever/infection during neutropenic period  Description: INTERVENTIONS:  - Monitor WBC    Outcome: Progressing     Problem: DISCHARGE PLANNING  Goal: Discharge to home or other facility with appropriate resources  Description: INTERVENTIONS:  - Identify barriers to discharge w/patient and caregiver  - Arrange for needed discharge resources and transportation as appropriate  - Identify discharge learning needs (meds, wound care, etc.)  - Arrange for interpretive services to assist at discharge as needed  - Refer to Case Management Department for coordinating discharge planning if the patient needs post-hospital services based on physician/advanced practitioner order or complex needs related to functional status, cognitive ability, or social support system  Outcome: Progressing     Problem: Knowledge Deficit  Goal: Patient/family/caregiver demonstrates understanding of disease process, treatment plan, medications, and discharge instructions  Description: Complete learning assessment and assess knowledge base. Interventions:  - Provide teaching at level of understanding  - Provide teaching via preferred learning methods  Outcome: Progressing     Problem: Nutrition/Hydration-ADULT  Goal: Nutrient/Hydration intake appropriate for improving, restoring or maintaining nutritional needs  Description: Monitor and assess patient's nutrition/hydration status for malnutrition. Collaborate with interdisciplinary team and initiate plan and interventions as ordered. Monitor patient's weight and dietary intake as ordered or per policy. Utilize nutrition screening tool and intervene as necessary. Determine patient's food preferences and provide high-protein, high-caloric foods as appropriate.      INTERVENTIONS:  - Monitor oral intake, urinary output, labs, and treatment plans  - Assess nutrition and hydration status and recommend course of action  - Evaluate amount of meals eaten  - Assist patient with eating if necessary   - Allow adequate time for meals  - Recommend/ encourage appropriate diets, oral nutritional supplements, and vitamin/mineral supplements  - Order, calculate, and assess calorie counts as needed  - Recommend, monitor, and adjust tube feedings and TPN/PPN based on assessed needs  - Assess need for intravenous fluids  - Provide specific nutrition/hydration education as appropriate  - Include patient/family/caregiver in decisions related to nutrition  Outcome: Progressing

## 2023-10-27 LAB
ANION GAP SERPL CALCULATED.3IONS-SCNC: 9 MMOL/L
BACTERIA SPEC ANAEROBE CULT: NORMAL
BASOPHILS # BLD AUTO: 0.04 THOUSANDS/ÂΜL (ref 0–0.1)
BASOPHILS NFR BLD AUTO: 0 % (ref 0–1)
BUN SERPL-MCNC: 17 MG/DL (ref 5–25)
CALCIUM SERPL-MCNC: 8.1 MG/DL (ref 8.4–10.2)
CHLORIDE SERPL-SCNC: 106 MMOL/L (ref 96–108)
CO2 SERPL-SCNC: 21 MMOL/L (ref 21–32)
CREAT SERPL-MCNC: 0.94 MG/DL (ref 0.6–1.3)
EOSINOPHIL # BLD AUTO: 0.13 THOUSAND/ÂΜL (ref 0–0.61)
EOSINOPHIL NFR BLD AUTO: 1 % (ref 0–6)
ERYTHROCYTE [DISTWIDTH] IN BLOOD BY AUTOMATED COUNT: 14.1 % (ref 11.6–15.1)
GFR SERPL CREATININE-BSD FRML MDRD: 81 ML/MIN/1.73SQ M
GLUCOSE SERPL-MCNC: 195 MG/DL (ref 65–140)
GLUCOSE SERPL-MCNC: 205 MG/DL (ref 65–140)
GLUCOSE SERPL-MCNC: 221 MG/DL (ref 65–140)
GLUCOSE SERPL-MCNC: 245 MG/DL (ref 65–140)
GLUCOSE SERPL-MCNC: 257 MG/DL (ref 65–140)
HCT VFR BLD AUTO: 39.2 % (ref 36.5–49.3)
HGB BLD-MCNC: 12.4 G/DL (ref 12–17)
IMM GRANULOCYTES # BLD AUTO: 0.06 THOUSAND/UL (ref 0–0.2)
IMM GRANULOCYTES NFR BLD AUTO: 1 % (ref 0–2)
LYMPHOCYTES # BLD AUTO: 1.21 THOUSANDS/ÂΜL (ref 0.6–4.47)
LYMPHOCYTES NFR BLD AUTO: 11 % (ref 14–44)
MCH RBC QN AUTO: 32.1 PG (ref 26.8–34.3)
MCHC RBC AUTO-ENTMCNC: 31.6 G/DL (ref 31.4–37.4)
MCV RBC AUTO: 102 FL (ref 82–98)
MONOCYTES # BLD AUTO: 1.01 THOUSAND/ÂΜL (ref 0.17–1.22)
MONOCYTES NFR BLD AUTO: 9 % (ref 4–12)
MRSA NOSE QL CULT: NORMAL
NEUTROPHILS # BLD AUTO: 9.11 THOUSANDS/ÂΜL (ref 1.85–7.62)
NEUTS SEG NFR BLD AUTO: 78 % (ref 43–75)
NRBC BLD AUTO-RTO: 0 /100 WBCS
PLATELET # BLD AUTO: 201 THOUSANDS/UL (ref 149–390)
PMV BLD AUTO: 11.2 FL (ref 8.9–12.7)
POTASSIUM SERPL-SCNC: 4.3 MMOL/L (ref 3.5–5.3)
RBC # BLD AUTO: 3.86 MILLION/UL (ref 3.88–5.62)
SODIUM SERPL-SCNC: 136 MMOL/L (ref 135–147)
VANCOMYCIN SERPL-MCNC: 9.4 UG/ML (ref 10–20)
WBC # BLD AUTO: 11.56 THOUSAND/UL (ref 4.31–10.16)

## 2023-10-27 PROCEDURE — 80202 ASSAY OF VANCOMYCIN: CPT

## 2023-10-27 PROCEDURE — 99232 SBSQ HOSP IP/OBS MODERATE 35: CPT | Performed by: PODIATRIST

## 2023-10-27 PROCEDURE — 99232 SBSQ HOSP IP/OBS MODERATE 35: CPT | Performed by: INTERNAL MEDICINE

## 2023-10-27 PROCEDURE — 80048 BASIC METABOLIC PNL TOTAL CA: CPT

## 2023-10-27 PROCEDURE — 85025 COMPLETE CBC W/AUTO DIFF WBC: CPT

## 2023-10-27 PROCEDURE — 82948 REAGENT STRIP/BLOOD GLUCOSE: CPT

## 2023-10-27 PROCEDURE — 97164 PT RE-EVAL EST PLAN CARE: CPT

## 2023-10-27 PROCEDURE — 97168 OT RE-EVAL EST PLAN CARE: CPT

## 2023-10-27 RX ORDER — OXYCODONE HYDROCHLORIDE AND ACETAMINOPHEN 5; 325 MG/1; MG/1
1 TABLET ORAL EVERY 4 HOURS PRN
Status: DISCONTINUED | OUTPATIENT
Start: 2023-10-27 | End: 2023-11-01 | Stop reason: HOSPADM

## 2023-10-27 RX ORDER — VANCOMYCIN HYDROCHLORIDE 500 MG/100ML
500 INJECTION, SOLUTION INTRAVENOUS ONCE
Status: COMPLETED | OUTPATIENT
Start: 2023-10-27 | End: 2023-10-27

## 2023-10-27 RX ORDER — INSULIN LISPRO 100 [IU]/ML
2 INJECTION, SOLUTION INTRAVENOUS; SUBCUTANEOUS
Status: DISCONTINUED | OUTPATIENT
Start: 2023-10-27 | End: 2023-10-28

## 2023-10-27 RX ORDER — CEFAZOLIN SODIUM 2 G/50ML
2000 SOLUTION INTRAVENOUS EVERY 8 HOURS
Status: DISCONTINUED | OUTPATIENT
Start: 2023-10-27 | End: 2023-10-27

## 2023-10-27 RX ORDER — LISINOPRIL 20 MG/1
40 TABLET ORAL DAILY
Status: DISCONTINUED | OUTPATIENT
Start: 2023-10-27 | End: 2023-11-01 | Stop reason: HOSPADM

## 2023-10-27 RX ORDER — INSULIN GLARGINE 100 [IU]/ML
5 INJECTION, SOLUTION SUBCUTANEOUS
Status: DISCONTINUED | OUTPATIENT
Start: 2023-10-27 | End: 2023-10-28

## 2023-10-27 RX ADMIN — INSULIN LISPRO 2 UNITS: 100 INJECTION, SOLUTION INTRAVENOUS; SUBCUTANEOUS at 17:32

## 2023-10-27 RX ADMIN — METOPROLOL SUCCINATE 100 MG: 100 TABLET, EXTENDED RELEASE ORAL at 09:27

## 2023-10-27 RX ADMIN — INSULIN LISPRO 2 UNITS: 100 INJECTION, SOLUTION INTRAVENOUS; SUBCUTANEOUS at 07:48

## 2023-10-27 RX ADMIN — INSULIN LISPRO 3 UNITS: 100 INJECTION, SOLUTION INTRAVENOUS; SUBCUTANEOUS at 12:20

## 2023-10-27 RX ADMIN — DILTIAZEM HYDROCHLORIDE 120 MG: 60 TABLET, FILM COATED ORAL at 20:05

## 2023-10-27 RX ADMIN — SODIUM CHLORIDE 3 G: 9 INJECTION, SOLUTION INTRAVENOUS at 17:44

## 2023-10-27 RX ADMIN — INSULIN GLARGINE 5 UNITS: 100 INJECTION, SOLUTION SUBCUTANEOUS at 21:53

## 2023-10-27 RX ADMIN — INSULIN LISPRO 2 UNITS: 100 INJECTION, SOLUTION INTRAVENOUS; SUBCUTANEOUS at 21:55

## 2023-10-27 RX ADMIN — DILTIAZEM HYDROCHLORIDE 120 MG: 60 TABLET, FILM COATED ORAL at 09:30

## 2023-10-27 RX ADMIN — ATORVASTATIN CALCIUM 40 MG: 40 TABLET, FILM COATED ORAL at 09:27

## 2023-10-27 RX ADMIN — VANCOMYCIN HYDROCHLORIDE 500 MG: 500 INJECTION, SOLUTION INTRAVENOUS at 09:42

## 2023-10-27 RX ADMIN — INSULIN LISPRO 3 UNITS: 100 INJECTION, SOLUTION INTRAVENOUS; SUBCUTANEOUS at 17:31

## 2023-10-27 RX ADMIN — VANCOMYCIN HYDROCHLORIDE 1000 MG: 1 INJECTION, SOLUTION INTRAVENOUS at 06:07

## 2023-10-27 RX ADMIN — APIXABAN 5 MG: 5 TABLET, FILM COATED ORAL at 17:43

## 2023-10-27 RX ADMIN — TAMSULOSIN HYDROCHLORIDE 0.4 MG: 0.4 CAPSULE ORAL at 17:43

## 2023-10-27 RX ADMIN — LISINOPRIL 40 MG: 20 TABLET ORAL at 09:27

## 2023-10-27 RX ADMIN — APIXABAN 5 MG: 5 TABLET, FILM COATED ORAL at 09:27

## 2023-10-27 RX ADMIN — METOPROLOL SUCCINATE 100 MG: 100 TABLET, EXTENDED RELEASE ORAL at 20:05

## 2023-10-27 NOTE — PLAN OF CARE
Problem: OCCUPATIONAL THERAPY ADULT  Goal: Performs self-care activities at highest level of function for planned discharge setting. See evaluation for individualized goals. Description: Treatment Interventions: ADL retraining, Functional transfer training, UE strengthening/ROM, Endurance training, Cognitive reorientation, Patient/family training, Equipment evaluation/education, Compensatory technique education, Energy conservation, Activityengagement  Equipment Recommended: (S) Shower/Tub chair with back ($) (Pt reports he has a plastic chair for the shower, however, Pt advised against using that and purchasing a medical SC.)       See flowsheet documentation for full assessment, interventions and recommendations. Note: Limitation: Decreased ADL status, Decreased UE ROM, Decreased UE strength, Decreased Safe judgement during ADL, Decreased cognition, Decreased endurance, Decreased self-care trans, Decreased high-level ADLs  Prognosis: Good  Assessment: Pt greeted for OT re-eval on 10/27/2023 due to Pt s/p left foot I&D with foreign body removal on 10/26 with podiatry. Pt now NWB on LLE. Pt with initial OT eval on 10/25 and Pt demonstrating the following occupational deficits: I with all ADLs, I with bed mobility, I with functional transfers, and S with functional mobility with no AD. Pt then DC from skilled OT services and now OT reconsulted on 10/27. Upon OT re-eval Pt demonstrates the following occupational deficits: S with UB ADLs, mod A with LB ADLs, S with bed mobility, min A with functional transfers, and min A with functional mobility with RW. Limitations that impact functional performance include decreased ADL status, decreased UE ROM, decreased UE strength, decreased safe judgement during ADLs, decreased cognition, decreased endurance, decreased self care transfers, decreased high level ADLs, and pain.  Occupational performance areas to address ADL retraining, functional transfer training, UE strengthening/ROM, endurance training, cognitive reorientation, Pt/caregiver education, equipment evaluation/education, compensatory technique education, energy conservation, and activity engagement . Pt would benefit from continued skilled OT services while in hospital to maximize independence with ADLs. Will continue to follow Pt's progress. Pt would benefit from returning home with Russel Johnson pending increased assist at home upon DC to maximize safety and independence with ADLs and functional tasks of choice.      OT Discharge Recommendation: Home with home health rehabilitation (pending progress/increased social support)

## 2023-10-27 NOTE — PROGRESS NOTES
INTERNAL MEDICINE RESIDENCY PROGRESS NOTE     Name: Lamont Ricks. Age & Sex: 72 y.o. male   MRN: [id number]  Unit/Bed#: [facility] 824-01   Encounter: [id number]  Team: SOD Team C     PATIENT INFORMATION     Name: Lamont Ricks. Age & Sex: 72 y.o. male   MRN: [id number]  Hospital Stay Days: 3    ASSESSMENT/PLAN     Principal Problem:    Cellulitis of left lower extremity  Active Problems:    Hyperlipidemia    Type 2 diabetes mellitus with diabetic neuropathy ([facility])    Atrial fibrillation with RVR ([facility])    Essential hypertension    Benign prostatic hyperplasia with lower urinary tract symptoms    Sepsis ([address])    S/P transmetatarsal amputation of foot, right ([facility])      * Cellulitis of left lower extremity  Assessment & Plan  Patient presents to ED today with 1-day of increased erythema, warmth, and pain in LLE. He states he is unsure if he sustained a puncture wound on the bottom of his foot. He thinks it's possible he may have stepped on a nail when helping his daughter renovate her home. He did endorses some blood on his sock. Patient does have significant diabetic polyneuropathy and has undergone a transmetatarsal amputation on right foot in 2021. Patient was scheduled to see podiatry but advised to come to the hospital instead. On arrival patient met SIRS criteria with T 101.4, . Labs were significant for leukocytosis of 15.08. Lactic acid was normal. Procal negative. CRP and ESR elevated. Preliminary read of XR looks normal with no signs of osteomyelitis. S/p IV vancomycin and cefepime x1, 1 L NS, Tylenol 975 mg in ED. On physical exam, LLE had significant erythema, swelling, and warmth. Patient states pain has improved. Puncture wound noted on sole of left foot. Blood cultures still pending; per recommendation from Pharmacy, will continue IV vancomycin and will d/c IV cefepime. X-ray of the left lower extremity indicated no acute osseous abnormality.     Arterial dopplers from 10/25/23 demonstrated bilateral atherosclerotic disease of the femoral-popliteal arteries without focal stenosis. Left lower extremity great toe pressure was 52 mmHg, which was determined to be marginally within the healing range for a diabetic. Podiatry saw patient 10/25/23 for wound debridement on the plantar aspect of the fourth metatarsal. Wound probed to bone, raising concern for osteomyelitis. MRI obtained did not determine any determinate evidence of osteomyelitis, but given the clinical picture and the wound probing to bone, will empirically treat for osteomyelitis. Patient went to OR for a left foot washout and debridement for source control 10/26/23: "Patient underwent surgical washout/debridement of the wound on the plantar aspect of the fourth metatarsal. While debriding the wound, a 2 mm piece of glass was removed from the wound cavity, and source control was obtained. No abscesses, sinus tracts, or osteomyelitis observed during the procedure." Patient had no complications and feels good as of 10/27/23. Plan:  -Switch to Unasyn for further antibiotic therapy  -Follow-up pending blood cultures and wound cultures - negative at 48 hours x2 [facility]), other cultures pending  -Continue metoprolol, diltiazem, Eliquis, and lisinopril   -TD given  -Monitor WBC and fever curves   -Tylenol PRN for fever, pain  -Podiatry on board in hospital evaluation and management, assistance appreciated - per them, plan for delayed closure once signs of infection improved  -Outpatient follow-up with podiatry recommended. S/P transmetatarsal amputation of foot, right ([address])  Assessment & Plan  Stable    Sepsis ([address])  Assessment & Plan  On arrival patient met SIRS criteria with T 101.4, . Labs were significant for leukocytosis of 15.08. Lactic acid was normal. Procal negative. CRP and ESR elevated. Preliminary read of XR looks normal with no signs of osteomyelitis.      S/p IV vancomycin and cefepime x1, 1 L NS, Tylenol 975 mg in ED. Likely 2/2 to cellulitis of left lower extremity. UA significant for large leukocytes, 1+ protein, >1000 glucose, innumerable WBC. Negative nitrites. Urine culture pending. Endorsed some urinary frequency. Of note, podiatry evaluated patient 10/25/23 and debrided the wound on the plantar aspect of the fourth metatarsal. Wound was probed to bone, raising concern for osteomyelitis. MRI obtained on 10/25/23 demonstrated no definite presence of osteomyelitis. Considering the patient's clinical picture and the wound probing to bone, will empirically treat for osteomyelitis. As of 10/26/23, blood cultures, MRSA swab, and urine cultures still pending though have been negative thus far. Patient has continued to spike fevers over night and this morning. Will add ceftriaxone to abx regimen for appropriate gram negative coverage given concern for osteomyelitis. Patient went for surgical washout/debridement of the wound on the left foot 10/26/23. Procedure was successful and source control was obtained. No acute complications at this time, WBCs on 10/27/23 was 11.56 from 21.81 on 10/25/23. MRSA negative. Decreased redness and warmth of the left lower extremity, patient improving clinically. Plan:  -Follow-up urine culture, blood cultures negative x2  -Will switch to Unasyn for further antibiotic therapy  -Trend WBC, fever curves  -Tylenol PRN for fever, pain  -See plan under "cellulitis of left lower extremity"    Benign prostatic hyperplasia with lower urinary tract symptoms  Assessment & Plan  Plan:  -Continue home Flomax 0.4 mg daily      Essential hypertension  Assessment & Plan  /74 on arrival. Home regimen: Cardizem 90 mg BID, Lisinopril 40 mg daily, Metoprolol XL 75 mg BID. BP relatively well-controlled at present.     Plan:  -Continue to monitor BP  -Continue cardizem 120 mg BID  -Continue metoprolol 100 mg BID    Atrial fibrillation with RVR ([facility])  Assessment & Plan  EKG on arrival significant for "Afib RVR, incomplete RBBB, septal infarct (age undetermined). " Home regimen includes: Cardizem 90 mg BID, Eliquis 5 mg BID, Metoprolol 75 mg BID. Tachycardia partially 2/2 sepsis due to cellulitis. On 10/25/23, patient went into Afib with heart rates ranging in the high 90s to 130s. Per nursing, patient had heart rates as high as in the 160s last night. Metoprolol dose was increased to 100 mg BID on 10/25/23. Cardizem increased on 10/26 due to ongoing issues with rate control. Rates on 10/27 are improved, with rates now in the range of 90-100s. Plan:  -Continue home Eliquis 5 mg BID, [id number] score 5  -Continue Metoprolol to 100 mg BID, Cardizem 120 BID  -Monitor on telemetry       Type 2 diabetes mellitus with diabetic neuropathy [facility])  Assessment & Plan  Lab Results   Component Value Date    HGBA1C 9.8 (H) 10/25/2023       Recent Labs     10/26/23  2236 10/26/23  2321 10/27/23  0745 10/27/23  1117   POCGLU 175* 167* 221* 257*       Blood Sugar Average: Last 72 hrs:    Patient states he currently uses Lantus 41 units PM, Metformin 500 mg BID - on further review of his chart, this does not appear to be accurate. Plan:  -Monitor BG  -Based on insulin requirements over last 24 hours, will trial patient on basal bolus regimen: 5 units lantus qHS with 2 units with meals, plus correctional as needed  -Carb-controlled diet         Hyperlipidemia  Assessment & Plan  Plan:  -Continue home Lipitor 40 mg daily         Disposition: Continue inpatient care    SUBJECTIVE     Patient seen and examined. No acute events overnight. Patient reports he is feeling good, better than yesterday. He reports due to his neuropathy, he does not have any sensation at the site of the wound. He denies any fevers, chills, shortness of breath, chest pain, or calf pain. He also denies any nausea, vomiting, or diarrhea. Patient reports he slept well last night and has a good appetite.  He denies any pain or difficulty urinating. He denies any vision changes, headaches, dizziness, or weakness. OBJECTIVE     Vitals:    10/27/23 0716 10/27/23 0927 10/27/23 0929 10/27/23 1116   BP: 114/68 117/66 117/66 113/66   BP Location:       Pulse: 98  101 74   Resp: 16   16   Temp: 100 °F (37.8 °C)   99.5 °F (37.5 °C)   TempSrc:       SpO2: 96%  94% 96%      Temperature:   Temp (24hrs), Av.7 °F (37.6 °C), Min:98.2 °F (36.8 °C), Max:100.3 °F (37.9 °C)    Temperature: 99.5 °F (37.5 °C)  Intake & Output:  I/O         10/25 0701  10/26 0700 10/26 0701  10/27 07    P. O. 660     I.V.  200    IV Piggyback 230     Total Intake 890 200    Urine 300 200    Total Output 300 200    Net +590 0          Unmeasured Urine Occurrence 2 x           Weights: There is no height or weight on file to calculate BMI. Weight (last 2 days)       None          Physical Exam  Vitals and nursing note reviewed. Constitutional:       General: He is not in acute distress. Appearance: He is not ill-appearing, toxic-appearing or diaphoretic. HENT:      Head: Normocephalic and atraumatic. Right Ear: External ear normal.      Left Ear: External ear normal.      Nose: Nose normal.      Mouth/Throat:      Mouth: Mucous membranes are moist.      Pharynx: Oropharynx is clear. Eyes:      Extraocular Movements: Extraocular movements intact. Conjunctiva/sclera: Conjunctivae normal.      Pupils: Pupils are equal, round, and reactive to light. Cardiovascular:      Rate and Rhythm: Normal rate. Rhythm irregular. Pulses: Normal pulses. Heart sounds: Normal heart sounds. No murmur heard. No friction rub. No gallop. Pulmonary:      Effort: Pulmonary effort is normal. No respiratory distress. Breath sounds: Normal breath sounds. No wheezing, rhonchi or rales. Abdominal:      General: Abdomen is flat. Bowel sounds are normal. There is no distension. Palpations: Abdomen is soft. Tenderness:  There is no abdominal tenderness. There is no guarding. Musculoskeletal:      Cervical back: Normal range of motion. Right lower leg: No edema. Left lower leg: No edema. Feet:    Feet:      Comments: Transmetatarsal amputation of the right foot. Skin:     General: Skin is warm and dry. Capillary Refill: Capillary refill takes less than 2 seconds. Findings: Erythema (left lower extremity from the medial aspect of the ankle with mild streaks of erythema in the medial thigh area; associated with warmth; impoving relative to previous examinations) present. Neurological:      Mental Status: He is alert and oriented to person, place, and time. Mental status is at baseline. Psychiatric:         Mood and Affect: Mood normal.         Behavior: Behavior normal. Behavior is cooperative. LABORATORY DATA     Labs: I have personally reviewed pertinent reports. Results from last 7 days   Lab Units 10/27/23  0532 10/26/23  0548 10/25/23  0620   WBC Thousand/uL 11.56* 19.47* 21.81*   HEMOGLOBIN g/dL 12.4 13.2 15.2   HEMATOCRIT % 39.2 41.4 47.7   PLATELETS Thousands/uL 201 185 204   NEUTROS PCT % 78* 87* 83*   MONOS PCT % 9 6 7   EOS PCT % 1 0 1      Results from last 7 days   Lab Units 10/27/23  0532 10/26/23  0548 10/25/23  0620 10/24/23  1137   POTASSIUM mmol/L 4.3 4.1 4.1 4.4   CHLORIDE mmol/L 106 106 105 104   CO2 mmol/L 21 21 25 24   BUN mg/dL 17 15 14 19   CREATININE mg/dL 0.94 0.91 0.92 0.94   CALCIUM mg/dL 8.1* 8.4 9.0 9.1   ALK PHOS U/L  --   --  85 89   ALT U/L  --   --  20 17   AST U/L  --   --  14 13              Results from last 7 days   Lab Units 10/24/23  1137   INR  1.37*   PTT seconds 33     Results from last 7 days   Lab Units 10/24/23  1137   LACTIC ACID mmol/L 1.0           IMAGING & DIAGNOSTIC TESTING     Radiology Results: I have personally reviewed pertinent reports.   MRI foot/forefoot toes left w wo contrast    Result Date: 10/26/2023  Impression: Inhomogeneous fat suppression about the distal lateral foot. Motion degradation. Within the limitations of the examination, no definite abnormal marrow signal to suggest acute osteomyelitis. Findings suggesting cellulitis along the dorsum of the foot without abscess. Additional callus with ulceration along the plantar aspect of the fourth metatarsal head. Workstation performed: [id number]     XR foot 3+ views LEFT    Result Date: 10/25/2023  Impression: No acute osseous abnormality. Workstation performed: [id number]     Other Diagnostic Testing: I have personally reviewed pertinent reports.     ACTIVE MEDICATIONS     Current Facility-Administered Medications   Medication Dose Route Frequency    acetaminophen (TYLENOL) tablet 975 mg  975 mg Oral Q6H PRN    apixaban (ELIQUIS) tablet 5 mg  5 mg Oral BID    atorvastatin (LIPITOR) tablet 40 mg  40 mg Oral Daily    ceFAZolin (ANCEF) IVPB (premix in dextrose) 2,000 mg 50 mL  2,000 mg Intravenous Q8H    diltiazem (CARDIZEM) tablet 120 mg  120 mg Oral BID    insulin glargine (LANTUS) subcutaneous injection 5 Units 0.05 mL  5 Units Subcutaneous HS    insulin lispro (HumaLOG) 100 units/mL subcutaneous injection 1-6 Units  1-6 Units Subcutaneous TID AC    insulin lispro (HumaLOG) 100 units/mL subcutaneous injection 1-6 Units  1-6 Units Subcutaneous HS    insulin lispro (HumaLOG) 100 units/mL subcutaneous injection 2 Units  2 Units Subcutaneous TID With Meals    lisinopril (ZESTRIL) tablet 40 mg  40 mg Oral Daily    metoprolol succinate (TOPROL-XL) 24 hr tablet 100 mg  100 mg Oral BID    oxyCODONE-acetaminophen (PERCOCET) 5-325 mg per tablet 1 tablet  1 tablet Oral Q4H PRN    polyethylene glycol (MIRALAX) packet 17 g  17 g Oral Daily PRN    senna (SENOKOT) tablet 8.6 mg  1 tablet Oral Daily    tamsulosin (FLOMAX) capsule 0.4 mg  0.4 mg Oral Daily With Dinner       VTE Pharmacologic Prophylaxis: Eliquis  VTE Mechanical Prophylaxis: sequential compression device    Portions of the record may have been created with voice recognition software. Occasional wrong word or "sound a like" substitutions may have occurred due to the inherent limitations of voice recognition software. Read the chart carefully and recognize, using context, where substitutions have occurred.   ==  Rashid Hennessy MD  [id number] [facility]  Internal Medicine Residency PGY-1

## 2023-10-27 NOTE — PLAN OF CARE
Problem: PAIN - ADULT  Goal: Verbalizes/displays adequate comfort level or baseline comfort level  Description: Interventions:  - Encourage patient to monitor pain and request assistance  - Assess pain using appropriate pain scale  - Administer analgesics based on type and severity of pain and evaluate response  - Implement non-pharmacological measures as appropriate and evaluate response  - Consider cultural and social influences on pain and pain management  - Notify physician/advanced practitioner if interventions unsuccessful or patient reports new pain  Outcome: Progressing     Problem: INFECTION - ADULT  Goal: Absence or prevention of progression during hospitalization  Description: INTERVENTIONS:  - Assess and monitor for signs and symptoms of infection  - Monitor lab/diagnostic results  - Monitor all insertion sites, i.e. indwelling lines, tubes, and drains  - Monitor endotracheal if appropriate and nasal secretions for changes in amount and color  - [place] appropriate cooling/warming therapies per order  - Administer medications as ordered  - Instruct and encourage patient and family to use good hand hygiene technique  - Identify and instruct in appropriate isolation precautions for identified infection/condition  Outcome: Progressing  Goal: Absence of fever/infection during neutropenic period  Description: INTERVENTIONS:  - Monitor WBC    Outcome: Progressing     Problem: SAFETY ADULT  Goal: Patient will remain free of falls  Description: INTERVENTIONS:  - Educate patient/family on patient safety including physical limitations  - Instruct patient to call for assistance with activity   - Consult OT/PT to assist with strengthening/mobility   - Keep Call bell within reach  - Keep bed low and locked with side rails adjusted as appropriate  - Keep care items and personal belongings within reach  - Initiate and maintain comfort rounds  - Make Fall Risk Sign visible to staff  - Offer Toileting every 2 Hours, in advance of need  - Initiate/Maintain bed alarm  - Obtain necessary fall risk management equipment: bed alarm  - Apply yellow socks and bracelet for high fall risk patients  - Consider moving patient to room near nurses station  Outcome: Progressing  Goal: Maintain or return to baseline ADL function  Description: INTERVENTIONS:  -  Assess patient's ability to carry out ADLs; assess patient's baseline for ADL function and identify physical deficits which impact ability to perform ADLs (bathing, care of mouth/teeth, toileting, grooming, dressing, etc.)  - Assess/evaluate cause of self-care deficits   - Assess range of motion  - Assess patient's mobility; develop plan if impaired  - Assess patient's need for assistive devices and provide as appropriate  - Encourage maximum independence but intervene and supervise when necessary  - Involve family in performance of ADLs  - Assess for home care needs following discharge   - Consider OT consult to assist with ADL evaluation and planning for discharge  - Provide patient education as appropriate  Outcome: Progressing  Goal: Maintains/Returns to pre admission functional level  Description: INTERVENTIONS:  - Perform BMAT or MOVE assessment daily.   - Set and communicate daily mobility goal to care team and patient/family/caregiver. - Collaborate with rehabilitation services on mobility goals if consulted  - Perform Range of Motion 3 times a day. - Reposition patient every 2 hours.   - Dangle patient 3 times a day  - Stand patient 3 times a day  - Ambulate patient 3 times a day  - Out of bed to chair 3 times a day   - Out of bed for meals 3 times a day  - Out of bed for toileting  - Record patient progress and toleration of activity level   Outcome: Progressing

## 2023-10-27 NOTE — QUICK NOTE
I called the patient's wife to provide a medical update. All questions and concerns answered.      Bala Atkinson D.O.  PGY-3 Internal Medicine   [address]

## 2023-10-27 NOTE — PROGRESS NOTES
Podiatry - Progress Note  Patient: Silvia Kim 72 y.o. male   MRN: [id number]  PCP: La Nena Bettencourt MD  Unit/Bed#: [facility] 824-01 Encounter: [id number]  Date Of Visit: 10/27/23    ASSESSMENT:    Silvia Kim is a 72 y.o. male with:    Left diabetic foot ulceration - plantar lateral forefoot  -s/p left foot I&D with foreign body removal (DOS: 10/26/23)  Cellulitis of left lower extremity  Sepsis - POA  Type 2 DM with diabetic neuropathy  History of R TMA    PLAN:    POD1 Left foot incision and drainage with foreign body removal. The incision site is stable with mild improvement noted in michael-wound erythema, edema, and warmth  Plan for delayed primary closure of left foot incision once clinical signs of infection are resolved. No concrete OR plans at this time  Continue with local wound care consisting of packing, DSD, ACE. Podiatry to do dressing changes at this time. Given healthy bleeding note in the OR, no need for vascular intervention at this time. Continue to trend labs and vitals. Significant improvement in patient WBC, patient remains afebrile  Follow up intra-operative wound cultures   Continued to reinforce the importance of nonweightbearing to left foot. Pain is well controlled. Continue current pain management regimen. Elevation on green foam wedges or pillows when non-ambulatory  Rest of care per primary team.    Weightbearing status: Non-weightbearing left  foot       SUBJECTIVE:     The patient was seen, evaluated, and assessed at bedside today. The patient was awake, alert, and in no acute distress. The patient reports no pain in his left lower extremity at this time. Pain is well controlled with current pain management regimen. Patient reports normal appetite and using the restroom postoperatively. Patient denies N/V/F/chills/SOB/CP.       OBJECTIVE:     Vitals:   /68   Pulse 98   Temp 100 °F (37.8 °C)   Resp 16   SpO2 96%     Temp (24hrs), Av.9 °F (37.7 °C), Min:98.2 °F (36.8 °C), Max:101.4 °F (38.6 °C)      Physical Exam:     General:  Alert, cooperative, and in no distress. Lower Extremity:    Left lower extremity: surgical site is stable, no signs of active infection in wound base: no purulence, no malodor, no ascending erythema, no crepitus, no fluctuance. Improvement in dorsal erythema, edema, and warmth    Wound #: 1  Location: left plantar lateral forefoot  Length 5.5cm: Width 0.5cm: Depth 1.5cm:   Deepest Tissue Noted in Base: surgical bone  Probe to Bone: Yes  Peripheral Skin Description: Attached  Granulation: 100% Fibrotic Tissue: 0% Necrotic Tissue: 0%   Drainage Amount: minimal, bloody  Signs of Infection: Yes - michael-wound erythema, edema, streaking noted, however slightly improved post-operatively    Additional Data:     Labs:    Results from last 7 days   Lab Units 10/27/23  0532   WBC Thousand/uL 11.56*   HEMOGLOBIN g/dL 12.4   HEMATOCRIT % 39.2   PLATELETS Thousands/uL 201   NEUTROS PCT % 78*   LYMPHS PCT % 11*   MONOS PCT % 9   EOS PCT % 1     Results from last 7 days   Lab Units 10/27/23  0532 10/26/23  0548 10/25/23  0620   POTASSIUM mmol/L 4.3   < > 4.1   CHLORIDE mmol/L 106   < > 105   CO2 mmol/L 21   < > 25   BUN mg/dL 17   < > 14   CREATININE mg/dL 0.94   < > 0.92   CALCIUM mg/dL 8.1*   < > 9.0   ALK PHOS U/L  --   --  85   ALT U/L  --   --  20   AST U/L  --   --  14    < > = values in this interval not displayed. Results from last 7 days   Lab Units 10/24/23  1137   INR  1.37*       * I Have Reviewed All Lab Data Listed Above. Recent Cultures (last 7 days):     Results from last 7 days   Lab Units 10/25/23  1632 10/24/23  1205 10/24/23  1142 10/24/23  1137   BLOOD CULTURE   --   --  No Growth at 48 hrs. No Growth at 48 hrs. GRAM STAIN RESULT  No Polys or Bacteria seen  --   --   --    URINE CULTURE   --  Culture results to follow.   --   --    WOUND CULTURE  Culture too young- will reincubate  --   --   --      Results from last 7 days   Lab Units 10/25/23  1632   ANAEROBIC CULTURE  Culture results to follow. Imaging: I have personally reviewed pertinent films in PACS  EKG, Pathology, and Other Studies: I have personally reviewed pertinent reports. ** Please Note: Portions of the record may have been created with voice recognition software. Occasional wrong word or "sound a like" substitutions may have occurred due to the inherent limitations of voice recognition software. Read the chart carefully and recognize, using context, where substitutions have occurred.  **

## 2023-10-27 NOTE — PHYSICAL THERAPY NOTE
Physical Therapy Evaluation     Patient's Name: Dora Cano.     Admitting Diagnosis  Leg pain [M79.606]  Cellulitis of left lower leg [L03.116]  Sepsis ([address]) [A41.9]    Problem List  Patient Active Problem List   Diagnosis    Hyperlipidemia    Type 2 diabetes mellitus with diabetic neuropathy (HCC)    Atrial fibrillation with RVR ([address])    Right middle lobe pulmonary nodule    Essential hypertension    Benign prostatic hyperplasia with lower urinary tract symptoms    Microalbuminuria    Sepsis ([address])    Cellulitis of extremity    Aftercare for amputation stump    Ambulatory dysfunction    S/P transmetatarsal amputation of foot, right ([address])    Sleep disturbance    Anxiety    S/P R ALT flap to right foot    COVID-19    Gait abnormality    Vasomotor rhinitis    Corns and callosities    Tinea unguium    Cellulitis of left lower extremity       Past Medical History  Past Medical History:   Diagnosis Date    Arthritis     Atrial fibrillation ([address])     Diagnosed 11/2018    Benign prostate hyperplasia 04/2002    Cellulitis     right lower leg     Colon polyp 2006    Diabetes mellitus ([address])     Hearing aid worn     bilat    Hearing loss     90% loss left ear and 40% right ear    History of clubfoot     "since birth"    History of pneumonia     History of TIA (transient ischemic attack) 04/25/2017 11/30/18 pt denies    Hyperlipidemia 5/15/2014    Hypertension     Infectious viral hepatitis     "cant remember type"    Irregular heart beat     Afib    Neuropathy     both feet    Osteomyelitis ([address])     right great toe    Pneumonia     Right middle lobe pulmonary nodule 11/6/2018    Seasickness     Teeth missing     Type 2 diabetes mellitus with diabetic neuropathy ([address]) 11/6/2018    Wears glasses     reading    Wound, open     bottom of right foot       Past Surgical History  Past Surgical History:   Procedure Laterality Date    BUNIONECTOMY Right 12/4/2018    Procedure: 5TH METATARSAL BONE PARTIAL RESECTION, FULL THICKNESS DEBRIDEMENT OF DIABETIC ULCER;  Surgeon: Aleena Dunn DPM;  [facility];  Service: Podiatry    CLUB FOOT RELEASE Bilateral     COLONOSCOPY      COMPLEX WOUND CLOSURE TO EXTREMITY  4/27/2021    Procedure: COMPLEX WOUND CLOSURE TO EXTREMITY: RIGHT FOOT;  Surgeon: Juan R Rehman MD;  [facility];  Service: Plastics    EXTERNAL FIXATOR APPLICATION Right 6/71/0854    Procedure: Application multiplane external fixation;  Surgeon: Latonia Higuera DPM;  [facility];  Service: Podiatry    FOOT HARDWARE REMOVAL Right 2/17/2021    Procedure: REMOVAL EXTERNAL FIXATOR;  Surgeon: Latonia Higuera DPM;  [facility];  Service: Podiatry    FOREIGN BODY REMOVAL Right 4/27/2021    Procedure: REMOVAL FOREIGN BODY EXTREMITY: RIGHT FOOT;  Surgeon: Juan R Rehman MD;  [facility];  Service: Plastics    INCISION AND DRAINAGE OF WOUND Right 2/17/2021    Procedure: INCISION AND DRAINAGE (I&D) EXTREMITY;  Surgeon: Latonia Higuera DPM;  [facility];  Service: Podiatry    INCISION AND DRAINAGE OF WOUND Left 10/26/2023    Procedure: INCISION AND DRAINAGE (I&D) EXTREMITY; WASHOUT; REMOVAL OF FOREIGN BODY;  Surgeon: Radha Alvarado DPM;  [facility];  Service: Podiatry    ORIF FOOT FRACTURE Right 3/4/2021    Procedure: VAC PLACEMENT; SCREW FIXATION RIGHT FOOT FUSION;  Surgeon: Latonia Higuera DPM;  [facility];  Service: Podiatry    CA AMPUTATION FOOT TRANSMETARSAL Right 2/22/2021    Procedure: AMPUTATION TRANSMETATARSAL (TMA), REMOVAL NAIL IM T2 ICF HARDWARE;  Surgeon: Latonia Higuera DPM;  [facility];  Service: Podiatry    CA ARTHRD MIDTARSL/TARS MLT/TRANSVRS W/OSTEOT Right 2/12/2021    Procedure: foot ARTHRODESIS/FUSION;  Surgeon: Latonia Higuera DPM;  [facility];  Service: Podiatry    CA DEBRIDEMENT BONE MUSCLE &/FASCIA 20 SQ CM/< Right 12/2/2021    Procedure: DEBRIDEMENT OF TARSAL BONES WITH ANITBIODIC BEADS;  Surgeon: Tom Verma Bhavin Woodson DPM;  [facility];  Service: Podiatry    FL LNGTH/SHRT TENDON LEG/ANKLE 1 TENDON SPX Right 2/12/2021    Procedure: LENGTHEN TIBIAL TENDON, TRANS HEEL CORD;  Surgeon: Domitila Bar DPM;  [facility];  Service: Podiatry    FL MUSC MYOCUTANEOUS/FASCIOCUTANEOUS FLAP TRUNK Right 4/12/2021    Procedure: RECONSTRUCTION MICROSURGICAL W/ FREE FLAP;  Surgeon: Gabrielle Green MD;  [facility];  Service: Plastics    FL MUSC MYOCUTANEOUS/FASCIOCUTANEOUS FLAP TRUNK Right 4/12/2021    Procedure: TAKEBACK RECONSTRUCTION MICROSURGICAL W/ FREE FLAP;  Surgeon: Gabrielle Green MD;  [facility];  Service: Plastics    FL MUSC MYOCUTANEOUS/FASCIOCUTANEOUS FLAP TRUNK Right 4/13/2021    Procedure: RECONSTRUCTION MICROSURGICAL W/ FREE FLAP, RE EXPLORATION, MICRO VASCULAR REVISION;  Surgeon: Gabrielle Green MD;  [facility];  Service: Plastics    FL OSTC PRTL EXOSTC/CONDYLC METAR HEAD Right 12/29/2020    Procedure: EXCISION EXOSTOSIS;  Surgeon: Domitila Bar DPM;  [facility];  Service: Podiatry    FL SECONDARY CLOSURE SURG WOUND/DEHSN EXTSV/COMPLIC Right 46/94/6485    Procedure: PRIMARY DELAYED CLOSURE;  Surgeon: Domitila Bar DPM;  [facility];  Service: Podiatry    TOE AMPUTATION Right     partial great toe    TONSILLECTOMY      VAC DRESSING APPLICATION Right 2/6/2043    Procedure: APPLICATION VAC DRESSING EXTREMITY;  Surgeon: Gabrielle Green MD;  [facility];  Service: [address] DEBRIDEMENT Right 3/1/2021    Procedure: DEBRIDEMENT LOWER EXTREMITY [facility] OUT);   Surgeon: Gabrielle Green MD;  [facility];  Service: Plastics    WOUND DEBRIDEMENT Right 4/7/2021    Procedure: DEBRIDEMENT RIGHT FOOT;  Surgeon: Gabrielle Green MD;  [facility];  Service: Plastics    WOUND DEBRIDEMENT Right 4/12/2021    Procedure: DEBRIDEMENT LOWER EXTREMITY [facility] OUT); Surgeon: Ladarius Estrada MD;  [facility];  Service: Plastics    WOUND DEBRIDEMENT Right 4/27/2021    Procedure: DEBRIDEMENT LOWER EXTREMITY ([address] [facility] [address]): RIGHT FOOT;  Surgeon: Ladarius Estrada MD;  [facility];  Service: Plastics          10/27/23 0916   PT Last Visit   PT Visit Date 10/27/23   Note Type   Note type Re-Evaluation   Pain Assessment   Pain Assessment Tool 0-10   Pain Score 1   Pain Location/Orientation Orientation: Left; Location: Foot   Hospital Pain Intervention(s) Repositioned   Restrictions/Precautions   Weight Bearing Precautions Per Order Yes   LLE Weight Bearing Per Order (S)  NWB  (s/p I n D)   Braces or Orthoses Other (Comment)  (B/L Custom boots)   Other Precautions WBS; Multiple lines; Fall Risk;Pain;Telemetry   Home Living   Type of [address] [place] [address] One level;Ramped entrance   Bathroom Shower/Tub Walk-in shower   [address] [employer] bars in shower;Commode   [address] Cane;Walker; Wheelchair-manual   Additional Comments See IE   Prior Function   Level of [place] Independent with ADLs; Independent with functional mobility; Independent with IADLS   Lives With Spouse   Receives Help From Family   IADLs Independent with driving; Independent with medication management; Family/Friend/Other provides meals   Falls in the last 6 months 0   Vocational Retired   Comments SEE IE   General   Additional Pertinent History (S)  Pt's IE completed 10/25. Pt seen post  L foot I n D 2* change in WBS .    Family/Caregiver Present No   Cognition   Overall Cognitive Status WFL   Arousal/Participation Alert   Attention Within functional limits   Orientation Level Oriented X4   Memory Within functional limits   Following Commands Follows all commands and directions without difficulty   Comments Pt cooperative during session   Subjective   Subjective Agreeable to mobilize   RLE Assessment   RLE Assessment WFL  (prior TMA)   LLE Assessment   LLE Assessment WFL  (Not tested for Ankle/foot, s/p I nD)   Bed Mobility   Supine to Sit 5  Supervision   Sit to Supine 5  Supervision   Additional Comments Pt noted to be in bed upon arrival   Transfers   Sit to Stand 4  Minimal assistance (CG A)   Additional items Assist x 1; Increased time required   Stand to Sit 4  Minimal assistance (CG A)   Additional items Assist x 1   Additional Comments Completes w/RW, maintains NWB L LE   Ambulation/Elevation   Gait pattern Foward flexed   Gait Assistance 4  Minimal assist (CG A)   Additional items Verbal cues   Assistive Device Rolling walker   Distance 2 ft from bed to chair   Ambulation/Elevation Additional Comments Pt able to complete trasnfer from bed to chair with hopping on R LE , using RW. Maintains NWB L LE, intermittent cueing   Balance   Static Sitting Fair +   Dynamic Sitting Fair   Static Standing Fair -   Dynamic Standing Poor +   Ambulatory Poor +   Activity Tolerance   Activity Tolerance Patient tolerated treatment well   Medical Staff Made Aware OT [address]   Nurse Made Aware RN cleared pt for therapy   Assessment   Prognosis Fair   Problem List Decreased endurance;Decreased mobility;Pain;Decreased skin integrity   Assessment Pt is a 72 y.o. male seen  s/p admit to [address] [place] [address] on 10/24/2023 with IE completed on 10/25. Pt was admitted with a primary dx of:  Cellulitis L LE, Sepsis, A-fib with RVR. PMH of  previous R TMA, BPH ,IDDM with neuropathy, HTN, HLD, A Fib . Pt is now s/p L foot I n D on 10/26 and is NWB per orders. PT re- evaluation completed today post procedure and with updated WBS. Upon evaluation, pt currently is requiring supervision for bed mobility , with CG A for STS and transfers from bed to and from recliner, with pt being able to hop on R LE for 2-3 steps. Pt able to maintain L LE NWB , using RW for mobility.   Pt presents at PT eval functioning below baseline and currently w/ overall mobility deficits 2* to: decreased endurance, pain, decreased activity tolerance compared to baseline, decreased skin integrity. Pt will continue to benefit from skilled acute PT interventions to maximize functional mobility; for ongoing pt training; and DME needs. At conclusion of PT session pt returned BTB, all needs in reach, and RN notified of session findings/recommendations with phone and call bell within reach. Pt denies any further questions at this time. The patient's AM-PAC Basic Mobility Inpatient Short Form Raw Score is 16. A Raw score of less than or equal to 16 suggests the patient may benefit from discharge to post-acute rehabilitation services. Please also refer to the recommendation of the Physical Therapist for safe discharge planning. Pt does have manual w/c at home with ramp access into house. Pt states pta was able to use w/c around house for mobility of needed , and reports spouse being able to assist post dc. Recommend HHPT with increased caregiver support, with W/C use for mobility and RW only for transfers, pending further progress with mobility . Goals   Patient Goals to go home   STG Expiration Date 11/10/23   Short Term Goal #1 STG 1. Pt will be able to perform bed mobility tasks with Mod I in order to improve overall functional mobility and assist in safe d/c. STG 2. Pt with sit EOB for at least 25 minutes at Ind level in order to strengthen abdominal musculature and assist in future transfers/ ambulation. STG 3. Pt will be able to perform functional transfer with Supervision in order to improve overall functional mobility and assist in safe d/c. STG 4. Pt will be able to ambulate at least 15 feet with least restrictive device with Supervision A in order to improve overall functional mobility and assist in safe d/c. STG 5. Pt will improve sitting/standing static/dynamic balance 1/2 grade in order to improve functional mobility and assist in safe d/c. STG 6.  Pt will improve LE strength by 1/2 grade in order to improve functional mobility and assist in safe d/c.   PT Treatment Day 1   Plan   Treatment/Interventions Functional transfer training; Therapeutic exercise; Bed mobility;Gait training;Spoke to nursing;OT   PT Frequency 2-3x/wk   Discharge Recommendation   PT Discharge Recommendation (S)  Home with home health rehabilitation  (with increased caregiver support pending further progress withmobility)   Equipment Recommended Wheelchair   AM-PAC Basic Mobility Inpatient   Turning in Flat Bed Without Bedrails 4   Lying on Back to Sitting on Edge of Flat Bed Without Bedrails 3   Moving Bed to Chair 3   Standing Up From Chair Using Arms 3   Walk in Room 2   Climb 3-5 Stairs With Railing 1   Basic Mobility Inpatient Raw Score 16   Basic Mobility Standardized Score 38.32   Highest Level Of Mobility   JH-HLM Goal 5: Stand one or more mins   JH-HLM Achieved 5: Stand (1 or more minutes)   Modified Prosper Scale   Modified Prosper Scale 4   End of Consult   Patient Position at End of Consult All needs within reach;Bed/Chair alarm activated;Supine         Carley Norwood, PT DPT

## 2023-10-27 NOTE — ANESTHESIA POSTPROCEDURE EVALUATION
Post-Op Assessment Note    CV Status:  Stable    Pain management: adequate     Mental Status:  Alert and awake   Hydration Status:  Stable   PONV Controlled:  Controlled   Airway Patency:  Patent      Post Op Vitals Reviewed: Yes      Staff: CRNA, Anesthesiologist         No notable events documented.     BP   113/62   Temp      Pulse  107   Resp   18   SpO2   99

## 2023-10-27 NOTE — PROGRESS NOTES
Alberto Hansen is a 72 y.o. male who is currently ordered Vancomycin IV with management by the Pharmacy Consult service. Relevant clinical data and objective / subjective history reviewed. Vancomycin Assessment:  Indication and Goal AUC/Trough: Soft tissue (goal -600, trough >10), -600, trough >10  Clinical Status: stable  Micro:       Renal Function:  SCr: 0.94 mg/dL  CrCl: 88.5 mL/min  Renal replacement: Not on dialysis  Days of Therapy: 4  Current Dose: 1000 mg Q12H  Vancomycin Plan:  New Dosin mg Q12H (with supplemental 500 mg dose initially to bring level to therapeutic)  Estimated AUC: 445 mcg*hr/mL  Estimated Trough: 13.2 mcg/mL  Next Level: 11/1 AM draw  Renal Function Monitoring: Daily BMP and [place] will continue to follow closely for s/sx of nephrotoxicity, infusion reactions and appropriateness of therapy. BMP and CBC will be ordered per protocol. We will continue to follow the patient’s culture results and clinical progress daily.     Katie Rodrigues, Pharmacist

## 2023-10-27 NOTE — ANESTHESIA PREPROCEDURE EVALUATION
Procedure:  INCISION AND DRAINAGE (I&D) EXTREMITY (Left: Foot)    Relevant Problems   CARDIO   (+) Atrial fibrillation with RVR (HCC)   (+) Essential hypertension   (+) Hyperlipidemia      ENDO   (+) Type 2 diabetes mellitus with diabetic neuropathy (HCC)      /RENAL   (+) Benign prostatic hyperplasia with lower urinary tract symptoms      NEURO/PSYCH   (+) Anxiety   (+) Type 2 diabetes mellitus with diabetic neuropathy (HCC)        LEFT VENTRICLE:  Systolic function was normal. Ejection fraction was estimated to be 55 %. There were no regional wall motion abnormalities. Wall thickness was mildly increased. LEFT ATRIUM:  The atrium was mildly dilated. RIGHT ATRIUM:  The atrium was mildly dilated. IVC, HEPATIC VEINS:  The inferior vena cava was mildly dilated. Physical Exam    Airway    Mallampati score: II  TM Distance: >3 FB  Neck ROM: full     Dental       Cardiovascular  Cardiovascular exam normal    Pulmonary  Pulmonary exam normal     Other Findings        Anesthesia Plan  ASA Score- 4 Emergent    Anesthesia Type- IV sedation with anesthesia with ASA Monitors. Additional Monitors:     Airway Plan:            Plan Factors-Exercise tolerance (METS): >4 METS. Chart reviewed. EKG reviewed. Imaging results reviewed. Existing labs reviewed. Patient summary reviewed. Induction- intravenous. Postoperative Plan-     Informed Consent- Anesthetic plan and risks discussed with patient. I personally reviewed this patient with the CRNA. Discussed and agreed on the Anesthesia Plan with the CRNA. Ariela Collins

## 2023-10-27 NOTE — OCCUPATIONAL THERAPY NOTE
Occupational Therapy RE-Evaluation     Patient Name: Killian Cedillo   Today's Date: 10/27/2023  Problem List  Principal Problem:    Cellulitis of left lower extremity  Active Problems:    Hyperlipidemia    Type 2 diabetes mellitus with diabetic neuropathy (HCC)    Atrial fibrillation with RVR ([address])    Essential hypertension    Benign prostatic hyperplasia with lower urinary tract symptoms    Sepsis ([address])    S/P transmetatarsal amputation of foot, right [facility])    Past Medical History  Past Medical History:   Diagnosis Date    Arthritis     Atrial fibrillation ([address])     Diagnosed 11/2018    Benign prostate hyperplasia 04/2002    Cellulitis     right lower leg     Colon polyp 2006    Diabetes mellitus ([address])     Hearing aid worn     bilat    Hearing loss     90% loss left ear and 40% right ear    History of clubfoot     "since birth"    History of pneumonia     History of TIA (transient ischemic attack) 04/25/2017 11/30/18 pt denies    Hyperlipidemia 5/15/2014    Hypertension     Infectious viral hepatitis     "cant remember type"    Irregular heart beat     Afib    Neuropathy     both feet    Osteomyelitis ([address])     right great toe    Pneumonia     Right middle lobe pulmonary nodule 11/6/2018    Seasickness     Teeth missing     Type 2 diabetes mellitus with diabetic neuropathy ([address]) 11/6/2018    Wears glasses     reading    Wound, open     bottom of right foot     Past Surgical History  Past Surgical History:   Procedure Laterality Date    BUNIONECTOMY Right 12/4/2018    Procedure: 5TH METATARSAL BONE PARTIAL RESECTION, FULL THICKNESS DEBRIDEMENT OF DIABETIC ULCER;  Surgeon: Katelynn Osborne DPM;  [facility];  Service: Podiatry    CLUB FOOT RELEASE Bilateral     COLONOSCOPY      COMPLEX WOUND CLOSURE TO EXTREMITY  4/27/2021    Procedure: COMPLEX WOUND CLOSURE TO EXTREMITY: RIGHT FOOT;  Surgeon: Diannah Denver, MD;  [facility];  Service: Plastics    EXTERNAL FIXATOR APPLICATION Right 2/12/2021    Procedure: Application multiplane external fixation;  Surgeon: Rodney Shay DPM;  [facility];  Service: Podiatry    FOOT HARDWARE REMOVAL Right 2/17/2021    Procedure: REMOVAL EXTERNAL FIXATOR;  Surgeon: Rodney Shay DPM;  [facility];  Service: Podiatry    FOREIGN BODY REMOVAL Right 4/27/2021    Procedure: REMOVAL FOREIGN BODY EXTREMITY: RIGHT FOOT;  Surgeon: Jose C Red MD;  [facility];  Service: Plastics    INCISION AND DRAINAGE OF WOUND Right 2/17/2021    Procedure: INCISION AND DRAINAGE (I&D) EXTREMITY;  Surgeon: Rodney Shay DPM;  [facility];  Service: Podiatry    INCISION AND DRAINAGE OF WOUND Left 10/26/2023    Procedure: INCISION AND DRAINAGE (I&D) EXTREMITY; WASHOUT; REMOVAL OF FOREIGN BODY;  Surgeon: Rosy Oakes DPM;  [facility];  Service: Podiatry    ORIF FOOT FRACTURE Right 3/4/2021    Procedure: VAC PLACEMENT; SCREW FIXATION RIGHT FOOT FUSION;  Surgeon: Rodney Shay DPM;  [facility];  Service: Podiatry    WI AMPUTATION FOOT TRANSMETARSAL Right 2/22/2021    Procedure: AMPUTATION TRANSMETATARSAL (TMA), REMOVAL NAIL IM T2 ICF HARDWARE;  Surgeon: Rodney Shay DPM;  [facility];  Service: Podiatry    WI ARTHRD MIDTARSL/TARS MLT/TRANSVRS W/OSTEOT Right 2/12/2021    Procedure: foot ARTHRODESIS/FUSION;  Surgeon: Rodney Shay DPM;  [facility];  Service: Podiatry     [address] &/FASCIA 20 SQ CM/< Right 12/2/2021    Procedure: DEBRIDEMENT OF TARSAL BONES WITH Michele Boyce;  Surgeon: Rodney Shay DPM;  [facility];  Service: Podiatry    WI LNGTH/SHRT TENDON LEG/ANKLE 1 TENDON SPX Right 2/12/2021    Procedure: LENGTHEN TIBIAL TENDON, TRANS HEEL CORD;  Surgeon: Rodney Shay DPM;  [facility];  Service: Podiatry    WI MUSC MYOCUTANEOUS/FASCIOCUTANEOUS FLAP TRUNK Right 4/12/2021    Procedure: RECONSTRUCTION MICROSURGICAL W/ FREE FLAP;  Surgeon: Norbert Crystal Celeste MD;  [facility];  Service: Plastics    LA MUSC MYOCUTANEOUS/FASCIOCUTANEOUS FLAP TRUNK Right 4/12/2021    Procedure: TAKEBACK RECONSTRUCTION MICROSURGICAL W/ FREE FLAP;  Surgeon: Eliel Luz MD;  [facility];  Service: Plastics    LA MUSC MYOCUTANEOUS/FASCIOCUTANEOUS FLAP TRUNK Right 4/13/2021    Procedure: RECONSTRUCTION MICROSURGICAL W/ FREE FLAP, RE EXPLORATION, MICRO VASCULAR REVISION;  Surgeon: Eliel Luz MD;  [facility];  Service: Plastics    LA OSTC PRTL EXOSTC/CONDYLC METAR HEAD Right 12/29/2020    Procedure: EXCISION EXOSTOSIS;  Surgeon: Debbie Conner DPM;  [facility];  Service: Podiatry    LA SECONDARY CLOSURE SURG WOUND/DEHSN EXTSV/COMPLIC Right 93/40/7234    Procedure: PRIMARY DELAYED CLOSURE;  Surgeon: Debbie Conner DPM;  [facility];  Service: Podiatry    TOE AMPUTATION Right     partial great toe    TONSILLECTOMY      VAC DRESSING APPLICATION Right 1/9/2148    Procedure: APPLICATION VAC DRESSING EXTREMITY;  Surgeon: Eliel Luz MD;  [facility];  Service: [address] DEBRIDEMENT Right 3/1/2021    Procedure: DEBRIDEMENT LOWER EXTREMITY [facility] OUT); Surgeon: Eliel Luz MD;  [facility];  Service: Plastics    WOUND DEBRIDEMENT Right 4/7/2021    Procedure: DEBRIDEMENT RIGHT FOOT;  Surgeon: Eliel Luz MD;  [facility];  Service: Plastics    WOUND DEBRIDEMENT Right 4/12/2021    Procedure: DEBRIDEMENT LOWER EXTREMITY [facility] OUT);   Surgeon: Eliel Luz MD;  [facility];  Service: Plastics    WOUND DEBRIDEMENT Right 4/27/2021    Procedure: DEBRIDEMENT LOWER EXTREMITY [facility] OUT): RIGHT FOOT;  Surgeon: Eliel Luz MD;  [facility];  Service: Plastics         10/27/23 0915   OT Last Visit   OT Visit Date 10/27/23   Note Type   Note type Re-Evaluation   Pain Assessment   Pain Assessment Tool 0-10   Pain Score No Pain   Restrictions/Precautions   Weight Bearing Precautions Per Order Yes   LLE Weight Bearing Per Order (S)  NWB   Braces or Orthoses   (B/L custom boots- R trans met amp)   Other Precautions Fall Risk;Pain;Telemetry   Home Living   Type of [address] [place] [address]  One level;Ramped entrance   Bathroom Shower/Tub Walk-in shower   Bathroom Toilet Standard   Bathroom Equipment Grab bars in shower;Commode   [address]; Wheelchair-manual  (lift chair)   Additional Comments SEE IE   Prior Function   Level of [place] Independent with ADLs; Independent with functional mobility; Independent with IADLS   Lives With Spouse   Receives Help From Family   IADLs Independent with driving; Independent with medication management; Family/Friend/Other provides meals   Falls in the last 6 months 0   Vocational Retired   Comments SEE IE   Lifestyle   Autonomy I with ADLs/splits IADL tasks with wife/no AD/ +   Reciprocal Relationships Supportive spouse   Service to Others Retired   [address] [facility] Enjoys golfing   ADL   Where Assessed Edge of bed   Eating Assistance 7  Independent   Grooming Assistance 5  Supervision/Setup   UB Bathing Assistance 5  Supervision/Setup   LB Bathing Assistance 3  Moderate [place] 5  Supervision/Setup   LB [address] Final 3  Moderate [address]  3  Moderate Assistance   Functional Assistance 4  Minimal Assistance   Functional Deficit Setup;Supervision/safety; Increased time to complete   Bed Mobility   Supine to Sit 5  Supervision   Sit to Supine 5  Supervision   Additional Comments Pt greeted supine in bed. Transfers   Sit to Stand 4  Minimal assistance   Additional items Assist x 1; Increased time required   Stand to Sit 4  Minimal assistance   Additional items Assist x 1   Additional Comments with RW   Functional Mobility   Functional Mobility 4  Minimal assistance   Additional Comments Min Ax1 with RW- hops on RLE   Additional items Rolling walker   Balance   Static Sitting Fair +   Dynamic Sitting Fair   Static Standing Poor +   Dynamic Standing Poor +   Ambulatory Poor +   Activity Tolerance   Activity Tolerance Patient tolerated treatment well   Medical Staff Made Aware MEL Pringle   Nurse Made Aware RN cleared/updated. RUE Assessment   RUE Assessment WFL   LUE Assessment   LUE Assessment WFL   Hand Function   Gross Motor Coordination Functional   Fine Motor Coordination Functional   Psychosocial   Psychosocial (WDL) WDL   Cognition   Overall Cognitive Status WFL   Arousal/Participation Cooperative; Alert   Attention Within functional limits   Orientation Level Oriented X4   Memory Within functional limits   Following Commands Follows all commands and directions without difficulty   Comments Pt pleasant and cooperativev dring OT session. Pt with mild decreased safety awareness during session. Assessment   Limitation Decreased ADL status; Decreased UE ROM; Decreased UE strength;Decreased Safe judgement during ADL;Decreased cognition;Decreased endurance;Decreased self-care trans;Decreased high-level ADLs   Prognosis Good   Assessment Pt greeted for OT re-eval on 10/27/2023 due to Pt s/p left foot I&D with foreign body removal on 10/26 with podiatry. Pt now NWB on LLE. Pt with initial OT eval on 10/25 and Pt demonstrating the following occupational deficits: I with all ADLs, I with bed mobility, I with functional transfers, and S with functional mobility with no AD. Pt then DC from skilled OT services and now OT reconsulted on 10/27. Upon OT re-eval Pt demonstrates the following occupational deficits: S with UB ADLs, mod A with LB ADLs, S with bed mobility, min A with functional transfers, and min A with functional mobility with RW.  Limitations that impact functional performance include decreased ADL status, decreased UE ROM, decreased UE strength, decreased safe judgement during ADLs, decreased cognition, decreased endurance, decreased self care transfers, decreased high level ADLs, and pain. Occupational performance areas to address ADL retraining, functional transfer training, UE strengthening/ROM, endurance training, cognitive reorientation, Pt/caregiver education, equipment evaluation/education, compensatory technique education, energy conservation, and activity engagement . Pt would benefit from continued skilled OT services while in hospital to maximize independence with ADLs. Will continue to follow Pt's progress. Pt would benefit from returning home with [facility] pending increased assist at home upon DC to maximize safety and independence with ADLs and functional tasks of choice. Goals   Patient Goals To go home. LTG Time Frame 10-14   Long Term Goal #1 See goals listed below. Plan   Treatment Interventions ADL retraining;Functional transfer training;UE strengthening/ROM; Endurance training;Cognitive reorientation;Patient/family training;Equipment evaluation/education; Compensatory technique education; Energy conservation; Activityengagement   Goal Expiration Date 11/10/23   OT Frequency 2-3x/wk   Discharge Recommendation   OT Discharge Recommendation Home with home health rehabilitation  (pending progress/increased social support)   Equipment Recommended (S)  Shower/Tub chair with back ($)  (Pt reports he has a plastic chair for the shower, however, Pt advised against using that and purchasing a medical SC.)   Additional Comments  The patient's raw score on the AM-PAC Daily Activity Inpatient Short Form is 17. A raw score of less than 19 suggests the patient may benefit from discharge to post-acute rehabilitation services. Please refer to the recommendation of the Occupational Therapist for safe discharge planning.    AM-PAC Daily Activity Inpatient   Lower Body Dressing 2   Bathing 2   Toileting 3   Upper Body Dressing 3   Grooming 3   Eating 4   Daily Activity Raw Score 17   Daily Activity Standardized Score (Calc for Raw Score >=11) 37.26   AM-PAC Applied Cognition Inpatient   Following a Speech/Presentation 4   Understanding Ordinary Conversation 4   Taking Medications 4   Remembering Where Things Are Placed or Put Away 4   Remembering List of 4-5 Errands 4   Taking Care of Complicated Tasks 3   Applied Cognition Raw Score 23   Applied Cognition Standardized Score 53.08   End of Consult   Education Provided Yes   Patient Position at End of Consult Bed/Chair alarm activated; All needs within reach; Supine   Nurse Communication Nurse aware of consult       Linda Meier MS, OTR/L

## 2023-10-27 NOTE — OP NOTE
OPERATIVE REPORT - Podiatry  PATIENT NAME: Albania Murdock. 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, with bone involvement without evidence of necrosis ([address]) [E11.621, L97.526]    Procedure(s) (LRB):  INCISION AND DRAINAGE (I&D) EXTREMITY; WASHOUT; REMOVAL OF FOREIGN BODY (Left)    Specimen(s):  ID Type Source Tests Collected by Time Destination   A :  Tissue Foot, Left ANAEROBIC CULTURE AND GRAM STAIN, CULTURE, TISSUE AND GRAM STAIN, AFB CULTURE WITH STAIN Carmita Perkins DPM 10/26/2023 2200        Estimated Blood Loss:   Minimal    Drains:  None    Anesthesia Type:   Choice with 20 ml of 1% Lidocaine and 0.5% Bupivacaine in a 1:1 mixture    Hemostasis:  Direct compression, electrocautery    Materials:   Iodoform packing    Injectables:  None    Operative Findings:  -We believe to have achieved source control  -Foreign body (2mm piece of glass) was discovered during the washout and was removed from the wound base  -Minimal amount of purulence, resection of all gray/nonviable tissue within wound base  -Healthy bleeding noted in wound base and at tissue margins  -Soft tissue remaining noted to be healthy and granular  -Explored along plantar aspect of flexor sheath, no notable sinus tracts.   -Bone within wound base noted to be of hard, viable quality    Complications:   None    Procedure and Technique:     Under mild sedation, the patient was brought into the operating room on a stretcher in the supine position. IV sedation was achieved by anesthesia team and a universal timeout was performed where all parties are in agreement of correct patient, correct procedure and correct site. An ankle block was performed consisting of 20 ml of 1% Lidocaine and 0.5% Bupivacaine in a 1:1 mixture. The foot was then prepped and draped in the usual aseptic manner. Attention was then directed to the left plantar lateral forefoot at the level of the wound located sub metatarsal 4. Using a #15 blade, a 5 cm longitudinal incision was made down to the level of deep tissue both proximal and distal to the wound. The wound was then excised. This allowed for exposure of the underlying tissue. Upon initial debridement in the wound bed, a foreign body of glass approximately 2mm in size was identified and removed and placed in a specimen cup. Using a #15 blade as well as a rongeur further resection of nonviable/gray tissue revealed minimal purulence. Deep wound cultures were then taken. There was noted to be underlying granular, bleeding tissue. The bone within the surgical site was noted to be of hard quality. The exploration of the proximal medial aspect of the incision site revealed no deep sinus tracts or pockets of purulence. The exploration of the distal aspect of the incision site revealed no dorsal sinus tracking or pockets of purulence. The surgical incision was irrigated with copious amounts of normal sterile saline using a pulse lavage. The foot was then cleansed and dried. The incision site was packed with iodoform packing and was dressed with gauze and an ABD. This was then covered with a kerlex and an ACE wrap. The patient tolerated the procedure and anesthesia well without immediate complications and transferred to PACU with vital signs stable. As with many limb salvage procedures, we contemplate the possibility of performing further stages to this procedure.  Procedures may include debridements, delayed closure, plastic surgery techniques, or more proximal amputations. This procedure may be considered part of a multi-staged limb salvage treatment plan. The patient is to remain nonweightbearing to the left lower extremity. Once resolution of infection is obtained, plan for delayed primary closure of the surgical wound. Dr. Imani Munoz was present during the entire procedure and participated in all key aspects. SIGNATURE: Penelope Harp DPM  DATE: October 26, 2023  TIME: 10:23 PM      Portions of the record may have been created with voice recognition software. Occasional wrong word or "sound a like" substitutions may have occurred due to the inherent limitations of voice recognition software. Read the chart carefully and recognize, using context, where substitutions have occurred.

## 2023-10-28 LAB
ANION GAP SERPL CALCULATED.3IONS-SCNC: 8 MMOL/L
BACTERIA WND AEROBE CULT: ABNORMAL
BACTERIA WND AEROBE CULT: ABNORMAL
BASOPHILS # BLD AUTO: 0.03 THOUSANDS/ÂΜL (ref 0–0.1)
BASOPHILS NFR BLD AUTO: 0 % (ref 0–1)
BUN SERPL-MCNC: 14 MG/DL (ref 5–25)
CALCIUM SERPL-MCNC: 8 MG/DL (ref 8.4–10.2)
CHLORIDE SERPL-SCNC: 106 MMOL/L (ref 96–108)
CO2 SERPL-SCNC: 23 MMOL/L (ref 21–32)
CREAT SERPL-MCNC: 0.84 MG/DL (ref 0.6–1.3)
EOSINOPHIL # BLD AUTO: 0.17 THOUSAND/ÂΜL (ref 0–0.61)
EOSINOPHIL NFR BLD AUTO: 2 % (ref 0–6)
ERYTHROCYTE [DISTWIDTH] IN BLOOD BY AUTOMATED COUNT: 14 % (ref 11.6–15.1)
GFR SERPL CREATININE-BSD FRML MDRD: 88 ML/MIN/1.73SQ M
GLUCOSE SERPL-MCNC: 179 MG/DL (ref 65–140)
GLUCOSE SERPL-MCNC: 190 MG/DL (ref 65–140)
GLUCOSE SERPL-MCNC: 222 MG/DL (ref 65–140)
GLUCOSE SERPL-MCNC: 226 MG/DL (ref 65–140)
GLUCOSE SERPL-MCNC: 229 MG/DL (ref 65–140)
GRAM STN SPEC: ABNORMAL
HCT VFR BLD AUTO: 36 % (ref 36.5–49.3)
HGB BLD-MCNC: 11.8 G/DL (ref 12–17)
IMM GRANULOCYTES # BLD AUTO: 0.03 THOUSAND/UL (ref 0–0.2)
IMM GRANULOCYTES NFR BLD AUTO: 0 % (ref 0–2)
LYMPHOCYTES # BLD AUTO: 1.72 THOUSANDS/ÂΜL (ref 0.6–4.47)
LYMPHOCYTES NFR BLD AUTO: 19 % (ref 14–44)
MCH RBC QN AUTO: 31.9 PG (ref 26.8–34.3)
MCHC RBC AUTO-ENTMCNC: 32.8 G/DL (ref 31.4–37.4)
MCV RBC AUTO: 97 FL (ref 82–98)
MONOCYTES # BLD AUTO: 0.83 THOUSAND/ÂΜL (ref 0.17–1.22)
MONOCYTES NFR BLD AUTO: 9 % (ref 4–12)
NEUTROPHILS # BLD AUTO: 6.08 THOUSANDS/ÂΜL (ref 1.85–7.62)
NEUTS SEG NFR BLD AUTO: 70 % (ref 43–75)
NRBC BLD AUTO-RTO: 0 /100 WBCS
PLATELET # BLD AUTO: 222 THOUSANDS/UL (ref 149–390)
PMV BLD AUTO: 10.7 FL (ref 8.9–12.7)
POTASSIUM SERPL-SCNC: 4 MMOL/L (ref 3.5–5.3)
RBC # BLD AUTO: 3.7 MILLION/UL (ref 3.88–5.62)
SODIUM SERPL-SCNC: 137 MMOL/L (ref 135–147)
WBC # BLD AUTO: 8.86 THOUSAND/UL (ref 4.31–10.16)

## 2023-10-28 PROCEDURE — NC001 PR NO CHARGE: Performed by: PODIATRIST

## 2023-10-28 PROCEDURE — 80048 BASIC METABOLIC PNL TOTAL CA: CPT

## 2023-10-28 PROCEDURE — 99232 SBSQ HOSP IP/OBS MODERATE 35: CPT | Performed by: INTERNAL MEDICINE

## 2023-10-28 PROCEDURE — 85025 COMPLETE CBC W/AUTO DIFF WBC: CPT

## 2023-10-28 PROCEDURE — 82948 REAGENT STRIP/BLOOD GLUCOSE: CPT

## 2023-10-28 RX ORDER — INSULIN LISPRO 100 [IU]/ML
4 INJECTION, SOLUTION INTRAVENOUS; SUBCUTANEOUS
Status: DISCONTINUED | OUTPATIENT
Start: 2023-10-28 | End: 2023-10-31

## 2023-10-28 RX ORDER — INSULIN GLARGINE 100 [IU]/ML
6 INJECTION, SOLUTION SUBCUTANEOUS
Status: DISCONTINUED | OUTPATIENT
Start: 2023-10-28 | End: 2023-10-31

## 2023-10-28 RX ORDER — CEFAZOLIN SODIUM 2 G/50ML
2000 SOLUTION INTRAVENOUS EVERY 8 HOURS
Status: DISCONTINUED | OUTPATIENT
Start: 2023-10-28 | End: 2023-11-01 | Stop reason: HOSPADM

## 2023-10-28 RX ADMIN — METOPROLOL SUCCINATE 100 MG: 100 TABLET, EXTENDED RELEASE ORAL at 21:28

## 2023-10-28 RX ADMIN — CEFAZOLIN SODIUM 2000 MG: 2 SOLUTION INTRAVENOUS at 12:40

## 2023-10-28 RX ADMIN — ATORVASTATIN CALCIUM 40 MG: 40 TABLET, FILM COATED ORAL at 10:09

## 2023-10-28 RX ADMIN — CEFAZOLIN SODIUM 2000 MG: 2 SOLUTION INTRAVENOUS at 19:19

## 2023-10-28 RX ADMIN — TAMSULOSIN HYDROCHLORIDE 0.4 MG: 0.4 CAPSULE ORAL at 17:38

## 2023-10-28 RX ADMIN — INSULIN LISPRO 2 UNITS: 100 INJECTION, SOLUTION INTRAVENOUS; SUBCUTANEOUS at 21:30

## 2023-10-28 RX ADMIN — INSULIN LISPRO 1 UNITS: 100 INJECTION, SOLUTION INTRAVENOUS; SUBCUTANEOUS at 10:10

## 2023-10-28 RX ADMIN — DILTIAZEM HYDROCHLORIDE 120 MG: 60 TABLET, FILM COATED ORAL at 21:28

## 2023-10-28 RX ADMIN — DILTIAZEM HYDROCHLORIDE 120 MG: 60 TABLET, FILM COATED ORAL at 10:09

## 2023-10-28 RX ADMIN — LISINOPRIL 40 MG: 20 TABLET ORAL at 10:09

## 2023-10-28 RX ADMIN — INSULIN LISPRO 4 UNITS: 100 INJECTION, SOLUTION INTRAVENOUS; SUBCUTANEOUS at 17:39

## 2023-10-28 RX ADMIN — SODIUM CHLORIDE 3 G: 9 INJECTION, SOLUTION INTRAVENOUS at 05:02

## 2023-10-28 RX ADMIN — SODIUM CHLORIDE 3 G: 9 INJECTION, SOLUTION INTRAVENOUS at 10:12

## 2023-10-28 RX ADMIN — INSULIN LISPRO 2 UNITS: 100 INJECTION, SOLUTION INTRAVENOUS; SUBCUTANEOUS at 10:12

## 2023-10-28 RX ADMIN — APIXABAN 5 MG: 5 TABLET, FILM COATED ORAL at 10:09

## 2023-10-28 RX ADMIN — APIXABAN 5 MG: 5 TABLET, FILM COATED ORAL at 17:38

## 2023-10-28 RX ADMIN — METOPROLOL SUCCINATE 100 MG: 100 TABLET, EXTENDED RELEASE ORAL at 10:09

## 2023-10-28 RX ADMIN — INSULIN LISPRO 2 UNITS: 100 INJECTION, SOLUTION INTRAVENOUS; SUBCUTANEOUS at 12:44

## 2023-10-28 RX ADMIN — INSULIN GLARGINE 6 UNITS: 100 INJECTION, SOLUTION SUBCUTANEOUS at 21:28

## 2023-10-28 RX ADMIN — INSULIN LISPRO 2 UNITS: 100 INJECTION, SOLUTION INTRAVENOUS; SUBCUTANEOUS at 17:38

## 2023-10-28 NOTE — PROGRESS NOTES
INTERNAL MEDICINE RESIDENCY PROGRESS NOTE     Name: Vasyl Desouza. Age & Sex: 72 y.o. male   MRN: [id number]  Unit/Bed#: [facility] 824-01   Encounter: [id number]  Team: SOD Team C     PATIENT INFORMATION     Name: Vasyl Desouza. Age & Sex: 72 y.o. male   MRN: [id number]  Hospital Stay Days: 4    ASSESSMENT/PLAN     Principal Problem:    Cellulitis of left lower extremity  Active Problems:    Hyperlipidemia    Type 2 diabetes mellitus with diabetic neuropathy ([facility])    Atrial fibrillation with RVR ([facility])    Essential hypertension    Benign prostatic hyperplasia with lower urinary tract symptoms    Sepsis ([address])    S/P transmetatarsal amputation of foot, right ([facility])      * Cellulitis of left lower extremity  Assessment & Plan  Patient presented to ED with 1-day of increased erythema, warmth, and pain in LLE. He states he is unsure if he sustained a puncture wound on the bottom of his foot. He thinks it's possible he may have stepped on a nail when helping his daughter renovate her home. He did endorses some blood on his sock. Patient does have significant diabetic polyneuropathy and has undergone a transmetatarsal amputation on right foot in 2021. Patient was scheduled to see podiatry but advised to come to the hospital instead. On arrival patient met SIRS criteria with T 101.4, . Labs were significant for leukocytosis of 15.08. Lactic acid was normal. Procal negative. CRP and ESR elevated. Preliminary read of XR looks normal with no signs of osteomyelitis. S/p IV vancomycin and cefepime x1, 1 L NS, Tylenol 975 mg in ED. On physical exam, LLE had significant erythema, swelling, and warmth. Patient states pain has improved. Puncture wound noted on sole of left foot. Blood cultures still pending; per recommendation from Pharmacy, will continue IV vancomycin and will d/c IV cefepime. X-ray of the left lower extremity indicated no acute osseous abnormality.     Arterial dopplers from 10/25/23 demonstrated bilateral atherosclerotic disease of the femoral-popliteal arteries without focal stenosis. Left lower extremity great toe pressure was 52 mmHg, which was determined to be marginally within the healing range for a diabetic. Podiatry saw patient 10/25/23 for wound debridement on the plantar aspect of the fourth metatarsal. Wound probed to bone, raising concern for osteomyelitis. MRI obtained did not determine any determinate evidence of osteomyelitis, but given the clinical picture and the wound probing to bone, will empirically treat for osteomyelitis. Patient went to OR for a left foot washout and debridement for source control 10/26/23: "Patient underwent surgical washout/debridement of the wound on the plantar aspect of the fourth metatarsal. While debriding the wound, a 2 mm piece of glass was removed from the wound cavity, and source control was obtained. No abscesses, sinus tracts, or osteomyelitis observed during the procedure." Patient had no complications and feels good as of 10/27/23. Patient to continues to improve clinically. Plan:  -Switch to Ancef for further antibiotic therapy given resistance profile  -Follow-up pending blood cultures and wound cultures - negative at 72 hours x2 (BC), other cultures pending  -TD given  -Monitor WBC and fever curves   -Tylenol PRN for fever, pain  -Podiatry on board in hospital evaluation and management, assistance appreciated - per them, plan for delayed closure once signs of infection improved  -Outpatient follow-up with podiatry recommended    S/P transmetatarsal amputation of foot, right ([address])  Assessment & Plan  Stable    Sepsis ([address])  Assessment & Plan  On arrival patient met SIRS criteria with T 101.4, . Labs were significant for leukocytosis of 15.08. Lactic acid was normal. Procal negative. CRP and ESR elevated. Preliminary read of XR looks normal with no signs of osteomyelitis.  MRI obtained on 10/25/23 demonstrated no definite presence of osteomyelitis. Now s/p I&D with Podiatry. Patient's signs of infection continue to improve - BC remain negative, wound cultures growing Staph aureus resistant to ampicillin but susceptible to cefazolin. Other cultures pending. Plan:  -Follow-up remaining cultures, blood cultures negative x2  -Given resistance profile, will switch to Ancef  -Trend WBC, fever curves  -Tylenol PRN for fever, pain  -See plan under "cellulitis of left lower extremity"    Benign prostatic hyperplasia with lower urinary tract symptoms  Assessment & Plan  Plan:  -Continue home Flomax 0.4 mg daily      Essential hypertension  Assessment & Plan  /74 on arrival. Home regimen: Cardizem 90 mg BID, Lisinopril 40 mg daily, Metoprolol XL 75 mg BID. BP relatively well-controlled at present. Plan:  -Continue to monitor BP  -Continue cardizem 120 mg BID  -Continue metoprolol 100 mg BID    Atrial fibrillation with RVR ([facility])  Assessment & Plan  EKG on arrival significant for "Afib RVR, incomplete RBBB, septal infarct (age undetermined). " Home regimen includes: Cardizem 90 mg BID, Eliquis 5 mg BID, Metoprolol 75 mg BID. Tachycardia partially 2/2 sepsis due to cellulitis. On 10/25/23, patient went into Afib with heart rates ranging in the high 90s to 130s. Per nursing, patient had heart rates as high as in the 160s last night. Metoprolol dose was increased to 100 mg BID on 10/25/23. Cardizem increased on 10/26 due to ongoing issues with rate control. Rates remain improved on current doses, most rates  on telemetry.     Plan:  -Continue home Eliquis 5 mg BID, [id number] score 5  -Will continue Metoprolol to 100 mg BID, Cardizem 120 BID; if ongoing issues with rate control, will consider further uptitration of medications; however, expect rates to improve as infection continues to resolve  -Monitor on telemetry       Type 2 diabetes mellitus with diabetic neuropathy [facility])  Assessment & Plan  Lab Results   Component Value Date    HGBA1C 9.8 (H) 10/25/2023       Recent Labs     10/27/23  1730 10/27/23  2112 10/28/23  0712 10/28/23  1056   POCGLU 245* 195* 179* 229*       Blood Sugar Average: Last 72 hrs:    Patient states he currently uses Lantus 41 units PM, Metformin 500 mg BID - on further review of his chart, this does not appear to be accurate. Plan:  -Monitor BG  -Based on insulin requirements over last 24 hours, will increase basal bolus regimen: 6 units lantus qHS with 4 units with meals, plus correctional as needed  -Carb-controlled diet         Hyperlipidemia  Assessment & Plan  Plan:  -Continue home Lipitor 40 mg daily         Disposition: Remain admitted pending further antibiotic treatment and await culture results     SUBJECTIVE     Patient seen and examined. No acute events overnight, though per discussion with nursing staff patient experienced some bleeding from his surgical site after ambulating to the bathroom yesterday; bleeding was a small amount and the patient's site was cleaned and reinforced. This morning, patient endorses no acute complaints other than a desire to go home. He denies any chest pain, SOB, nausea/vomiting, abdominal pain, or other symptoms. He overall feels much better than when he originally presented to the hospital.    OBJECTIVE     Vitals:    10/28/23 0713 10/28/23 1009 10/28/23 1010 10/28/23 1058   BP: 137/81 138/79 138/79 138/81   Pulse: (!) 107 92 92 92   Resp: 17   18   Temp: 98.7 °F (37.1 °C)   99 °F (37.2 °C)   TempSrc:       SpO2: 94%  96% 97%      Temperature:   Temp (24hrs), Av.5 °F (37.5 °C), Min:98.7 °F (37.1 °C), Max:100.4 °F (38 °C)    Temperature: 99 °F (37.2 °C)  Intake & Output:  I/O         10/26 0701  10/27 0700 10/27 0701  10/28 0700    I.V. 200     Total Intake 200     Urine 200     Total Output 200     Net 0                 Weights: There is no height or weight on file to calculate BMI.   Weight (last 2 days)       None          Physical Exam  Vitals and nursing note reviewed. Constitutional:       General: He is not in acute distress. Appearance: Normal appearance. He is not ill-appearing. HENT:      Head: Normocephalic and atraumatic. Right Ear: External ear normal.      Left Ear: External ear normal.      Nose: Nose normal.      Mouth/Throat:      Mouth: Mucous membranes are moist.      Pharynx: Oropharynx is clear. Eyes:      Extraocular Movements: Extraocular movements intact. Conjunctiva/sclera: Conjunctivae normal.      Pupils: Pupils are equal, round, and reactive to light. Cardiovascular:      Rate and Rhythm: Normal rate. Rhythm irregular. Heart sounds: Normal heart sounds. No murmur heard. Pulmonary:      Effort: Pulmonary effort is normal. No respiratory distress. Breath sounds: Normal breath sounds. Abdominal:      General: Abdomen is flat. Bowel sounds are normal. There is no distension. Palpations: Abdomen is soft. Tenderness: There is no abdominal tenderness. Musculoskeletal:      Cervical back: Normal range of motion. Right lower leg: No edema. Left lower leg: No edema. Comments: Right transmetatarsal amputation   Skin:     General: Skin is warm and dry. Findings: Erythema present. Comments: Continues with erythema affecting the left lower extremity, improved from prior days   Neurological:      Mental Status: He is alert. Mental status is at baseline. Psychiatric:         Mood and Affect: Mood normal.         Behavior: Behavior normal.       LABORATORY DATA     Labs: I have personally reviewed pertinent reports.   Results from last 7 days   Lab Units 10/28/23  0524 10/27/23  0532 10/26/23  0548   WBC Thousand/uL 8.86 11.56* 19.47*   HEMOGLOBIN g/dL 11.8* 12.4 13.2   HEMATOCRIT % 36.0* 39.2 41.4   PLATELETS Thousands/uL 222 201 185   NEUTROS PCT % 70 78* 87*   MONOS PCT % 9 9 6   EOS PCT % 2 1 0      Results from last 7 days   Lab Units 10/28/23  0524 10/27/23  0532 10/26/23  0548 10/25/23  0620 10/24/23  1137   POTASSIUM mmol/L 4.0 4.3 4.1 4.1 4.4   CHLORIDE mmol/L 106 106 106 105 104   CO2 mmol/L 23 21 21 25 24   BUN mg/dL 14 17 15 14 19   CREATININE mg/dL 0.84 0.94 0.91 0.92 0.94   CALCIUM mg/dL 8.0* 8.1* 8.4 9.0 9.1   ALK PHOS U/L  --   --   --  85 89   ALT U/L  --   --   --  20 17   AST U/L  --   --   --  14 13              Results from last 7 days   Lab Units 10/24/23  1137   INR  1.37*   PTT seconds 33     Results from last 7 days   Lab Units 10/24/23  1137   LACTIC ACID mmol/L 1.0           IMAGING & DIAGNOSTIC TESTING     Radiology Results: I have personally reviewed pertinent reports. XR foot left 3+ views    Result Date: 10/27/2023  Impression: Possible packing material plantar aspect of fourth and fifth toe. No acute osseous abnormality. Workstation performed: [id number]     MRI foot/forefoot toes left w wo contrast    Result Date: 10/26/2023  Impression: Inhomogeneous fat suppression about the distal lateral foot. Motion degradation. Within the limitations of the examination, no definite abnormal marrow signal to suggest acute osteomyelitis. Findings suggesting cellulitis along the dorsum of the foot without abscess. Additional callus with ulceration along the plantar aspect of the fourth metatarsal head. Workstation performed: [id number]     XR foot 3+ views LEFT    Result Date: 10/25/2023  Impression: No acute osseous abnormality. Workstation performed: [id number]     Other Diagnostic Testing: I have personally reviewed pertinent reports.     ACTIVE MEDICATIONS     Current Facility-Administered Medications   Medication Dose Route Frequency    acetaminophen (TYLENOL) tablet 975 mg  975 mg Oral Q6H PRN    apixaban (ELIQUIS) tablet 5 mg  5 mg Oral BID    atorvastatin (LIPITOR) tablet 40 mg  40 mg Oral Daily    ceFAZolin (ANCEF) IVPB (premix in dextrose) 2,000 mg 50 mL  2,000 mg Intravenous Q8H    diltiazem (CARDIZEM) tablet 120 mg  120 mg Oral BID    insulin glargine (LANTUS) subcutaneous injection 6 Units 0.06 mL  6 Units Subcutaneous HS    insulin lispro (HumaLOG) 100 units/mL subcutaneous injection 1-6 Units  1-6 Units Subcutaneous TID AC    insulin lispro (HumaLOG) 100 units/mL subcutaneous injection 1-6 Units  1-6 Units Subcutaneous HS    insulin lispro (HumaLOG) 100 units/mL subcutaneous injection 4 Units  4 Units Subcutaneous TID With Meals    lisinopril (ZESTRIL) tablet 40 mg  40 mg Oral Daily    metoprolol succinate (TOPROL-XL) 24 hr tablet 100 mg  100 mg Oral BID    oxyCODONE-acetaminophen (PERCOCET) 5-325 mg per tablet 1 tablet  1 tablet Oral Q4H PRN    polyethylene glycol (MIRALAX) packet 17 g  17 g Oral Daily PRN    senna (SENOKOT) tablet 8.6 mg  1 tablet Oral Daily    tamsulosin (FLOMAX) capsule 0.4 mg  0.4 mg Oral Daily With Dinner       VTE Pharmacologic Prophylaxis: Eliquis  VTE Mechanical Prophylaxis: sequential compression device    Portions of the record may have been created with voice recognition software. Occasional wrong word or "sound a like" substitutions may have occurred due to the inherent limitations of voice recognition software. Read the chart carefully and recognize, using context, where substitutions have occurred.   ==  Padmini Gregory MD  [id number]  Internal Medicine Residency PGY-1

## 2023-10-28 NOTE — PLAN OF CARE
Problem: PAIN - ADULT  Goal: Verbalizes/displays adequate comfort level or baseline comfort level  Description: Interventions:  - Encourage patient to monitor pain and request assistance  - Assess pain using appropriate pain scale  - Administer analgesics based on type and severity of pain and evaluate response  - Implement non-pharmacological measures as appropriate and evaluate response  - Consider cultural and social influences on pain and pain management  - Notify physician/advanced practitioner if interventions unsuccessful or patient reports new pain  Outcome: Progressing     Problem: INFECTION - ADULT  Goal: Absence or prevention of progression during hospitalization  Description: INTERVENTIONS:  - Assess and monitor for signs and symptoms of infection  - Monitor lab/diagnostic results  - Monitor all insertion sites, i.e. indwelling lines, tubes, and drains  - Monitor endotracheal if appropriate and nasal secretions for changes in amount and color  - [place] appropriate cooling/warming therapies per order  - Administer medications as ordered  - Instruct and encourage patient and family to use good hand hygiene technique  - Identify and instruct in appropriate isolation precautions for identified infection/condition  Outcome: Progressing  Goal: Absence of fever/infection during neutropenic period  Description: INTERVENTIONS:  - Monitor WBC    Outcome: Progressing     Problem: SAFETY ADULT  Goal: Patient will remain free of falls  Description: INTERVENTIONS:  - Educate patient/family on patient safety including physical limitations  - Instruct patient to call for assistance with activity   - Consult OT/PT to assist with strengthening/mobility   - Keep Call bell within reach  - Keep bed low and locked with side rails adjusted as appropriate  - Keep care items and personal belongings within reach  - Initiate and maintain comfort rounds  - Make Fall Risk Sign visible to staff  - Offer Toileting every 4 Hours, in advance of need  - Initiate/Maintain bed alarm  - Obtain necessary fall risk management equipment:   - Apply yellow socks and bracelet for high fall risk patients  - Consider moving patient to room near nurses station  Outcome: Progressing  Goal: Maintain or return to baseline ADL function  Description: INTERVENTIONS:  -  Assess patient's ability to carry out ADLs; assess patient's baseline for ADL function and identify physical deficits which impact ability to perform ADLs (bathing, care of mouth/teeth, toileting, grooming, dressing, etc.)  - Assess/evaluate cause of self-care deficits   - Assess range of motion  - Assess patient's mobility; develop plan if impaired  - Assess patient's need for assistive devices and provide as appropriate  - Encourage maximum independence but intervene and supervise when necessary  - Involve family in performance of ADLs  - Assess for home care needs following discharge   - Consider OT consult to assist with ADL evaluation and planning for discharge  - Provide patient education as appropriate  Outcome: Progressing  Goal: Maintains/Returns to pre admission functional level  Description: INTERVENTIONS:  - Perform BMAT or MOVE assessment daily.   - Set and communicate daily mobility goal to care team and patient/family/caregiver. - Collaborate with rehabilitation services on mobility goals if consulted  - Perform Range of Motion 3 times a day. - Reposition patient every 3 hours.   - Dangle patient 3 times a day  - Stand patient 3 times a day  - Ambulate patient 3 times a day  - Out of bed to chair 3 times a day   - Out of bed for meals 3 times a day  - Out of bed for toileting  - Record patient progress and toleration of activity level   Outcome: Progressing     Problem: DISCHARGE PLANNING  Goal: Discharge to home or other facility with appropriate resources  Description: INTERVENTIONS:  - Identify barriers to discharge w/patient and caregiver  - Arrange for needed discharge resources and transportation as appropriate  - Identify discharge learning needs (meds, wound care, etc.)  - Arrange for interpretive services to assist at discharge as needed  - Refer to Case Management Department for coordinating discharge planning if the patient needs post-hospital services based on physician/advanced practitioner order or complex needs related to functional status, cognitive ability, or social support system  Outcome: Progressing     Problem: Knowledge Deficit  Goal: Patient/family/caregiver demonstrates understanding of disease process, treatment plan, medications, and discharge instructions  Description: Complete learning assessment and assess knowledge base. Interventions:  - Provide teaching at level of understanding  - Provide teaching via preferred learning methods  Outcome: Progressing     Problem: Prexisting or High Potential for Compromised Skin Integrity  Goal: Skin integrity is maintained or improved  Description: INTERVENTIONS:  - Identify patients at risk for skin breakdown  - Assess and monitor skin integrity  - Assess and monitor nutrition and hydration status  - Monitor labs   - Assess for incontinence   - Turn and reposition patient  - Assist with mobility/ambulation  - Relieve pressure over bony prominences  - Avoid friction and shearing  - Provide appropriate hygiene as needed including keeping skin clean and dry  - Evaluate need for skin moisturizer/barrier cream  - Collaborate with interdisciplinary team   - Patient/family teaching  - Consider wound care consult   Outcome: Progressing     Problem: Nutrition/Hydration-ADULT  Goal: Nutrient/Hydration intake appropriate for improving, restoring or maintaining nutritional needs  Description: Monitor and assess patient's nutrition/hydration status for malnutrition. Collaborate with interdisciplinary team and initiate plan and interventions as ordered. Monitor patient's weight and dietary intake as ordered or per policy.  Utilize nutrition screening tool and intervene as necessary. Determine patient's food preferences and provide high-protein, high-caloric foods as appropriate. INTERVENTIONS:  - Monitor oral intake, urinary output, labs, and treatment plans  - Assess nutrition and hydration status and recommend course of action  - Evaluate amount of meals eaten  - Assist patient with eating if necessary   - Allow adequate time for meals  - Recommend/ encourage appropriate diets, oral nutritional supplements, and vitamin/mineral supplements  - Order, calculate, and assess calorie counts as needed  - Recommend, monitor, and adjust tube feedings and TPN/PPN based on assessed needs  - Assess need for intravenous fluids  - Provide specific nutrition/hydration education as appropriate  - Include patient/family/caregiver in decisions related to nutrition  Outcome: Progressing     Problem: MOBILITY - ADULT  Goal: Maintain or return to baseline ADL function  Description: INTERVENTIONS:  -  Assess patient's ability to carry out ADLs; assess patient's baseline for ADL function and identify physical deficits which impact ability to perform ADLs (bathing, care of mouth/teeth, toileting, grooming, dressing, etc.)  - Assess/evaluate cause of self-care deficits   - Assess range of motion  - Assess patient's mobility; develop plan if impaired  - Assess patient's need for assistive devices and provide as appropriate  - Encourage maximum independence but intervene and supervise when necessary  - Involve family in performance of ADLs  - Assess for home care needs following discharge   - Consider OT consult to assist with ADL evaluation and planning for discharge  - Provide patient education as appropriate  Outcome: Progressing  Goal: Maintains/Returns to pre admission functional level  Description: INTERVENTIONS:  - Perform BMAT or MOVE assessment daily.   - Set and communicate daily mobility goal to care team and patient/family/caregiver.    - Collaborate with rehabilitation services on mobility goals if consulted  - Perform Range of Motion 3 times a day. - Reposition patient every 3 hours.   - Dangle patient 3 times a day  - Stand patient 3 times a day  - Ambulate patient 3 times a day  - Out of bed to chair 3 times a day   - Out of bed for meals 3 times a day  - Out of bed for toileting  - Record patient progress and toleration of activity level   Outcome: Progressing

## 2023-10-28 NOTE — PROGRESS NOTES
[facility] Podiatry - Progress Note  Patient: Kendrick Woodruff. 72 y.o. male   MRN: [id number]  PCP: Marylen Divine, MD  Unit/Bed#: [facility] 824-01 Encounter: [id number]  Date Of Visit: 10/28/23    ASSESSMENT:    Kendrick Baptiste is a 72 y.o. male with:    Left diabetic foot ulceration - plantar lateral forefoot  -s/p left foot I&D with foreign body removal (DOS: 10/26/23)  Cellulitis of left lower extremity  Sepsis - POA  Type 2 DM with diabetic neuropathy  History of R TMA    PLAN:    POD2 left foot incision and drainage with removal of foreign body. Incision site is stable. Mild improvement noted to warmth and streaking of the lower leg however michael-wound erythema and edema still appreciated specifically to dorsal foot and 4th digit. Patient will likely need to return to the operating room for delayed primary closure pending additional improvement in clinical signs of infection. No concrete OR plans at this time. Plan to continue local wound care pf packing, DSD and ACE. Podiatry to do all dressing changes at this time. Expressed to the patient the importance of strict non weight bearing to the left foot. Blood cultures negative at 72 hours. Follow up final intra-operative wound cultures. IV antibiotics switched from Vancomycin to Unasyn. Continues IV antibiotics per primary team.   Elevation on green foam wedges or pillows when non-ambulatory. Rest of care per primary team.    Weight bearing status: Non weight bearing left foot       SUBJECTIVE:     The patient was seen, evaluated, and assessed at bedside today. The patient was awake, alert, and in no acute distress. No acute events overnight. The patient reports he is feeling okay today and has had less pain to the area. Patient denies N/V/F/chills/SOB/CP.       OBJECTIVE:     Vitals:   /81   Pulse 92   Temp 99 °F (37.2 °C)   Resp 18   SpO2 97%     Temp (24hrs), Av.5 °F (37.5 °C), Min:98.7 °F (37.1 °C), Max:100.4 °F (38 °C)      Physical Exam: General:  Alert, cooperative, and in no distress. Lower extremity exam:  Cardiovascular status at baseline. Neurological status at baseline. Musculoskeletal status at baseline. No calf tenderness noted. Lower extremity wound(s) as noted below:  Wound #: 1  Location: Left plantar lateral forefoot   Length 5.5 cm: Width 0.5 cm: Depth 1.5cm:   Deepest Tissue Noted in Base: Surgical bone   Probe to Bone: Yes, surgical   Peripheral Skin Description: Attached  Granulation: 40% Fibrotic Tissue: 60% Necrotic Tissue: 0%   Drainage Amount: minimal, serosanguinous  Signs of Infection: Yes    Clinical Images 10/28/23: Additional Data:     Labs:    Results from last 7 days   Lab Units 10/28/23  0524   WBC Thousand/uL 8.86   HEMOGLOBIN g/dL 11.8*   HEMATOCRIT % 36.0*   PLATELETS Thousands/uL 222   NEUTROS PCT % 70   LYMPHS PCT % 19   MONOS PCT % 9   EOS PCT % 2     Results from last 7 days   Lab Units 10/28/23  0524 10/26/23  0548 10/25/23  0620   POTASSIUM mmol/L 4.0   < > 4.1   CHLORIDE mmol/L 106   < > 105   CO2 mmol/L 23   < > 25   BUN mg/dL 14   < > 14   CREATININE mg/dL 0.84   < > 0.92   CALCIUM mg/dL 8.0*   < > 9.0   ALK PHOS U/L  --   --  85   ALT U/L  --   --  20   AST U/L  --   --  14    < > = values in this interval not displayed. Results from last 7 days   Lab Units 10/24/23  1137   INR  1.37*       * I Have Reviewed All Lab Data Listed Above. Recent Cultures (last 7 days):     Results from last 7 days   Lab Units 10/26/23  2200 10/25/23  1632 10/24/23  1205 10/24/23  1142 10/24/23  1137   BLOOD CULTURE   --   --   --  No Growth at 72 hrs. No Growth at 72 hrs. GRAM STAIN RESULT  1+ Polys  No bacteria seen No Polys or Bacteria seen  --   --   --    URINE CULTURE   --   --  Culture results to follow.   --   --    WOUND CULTURE   --  1+ Growth of Staphylococcus aureus*  1+ Growth of  --   --   --      Results from last 7 days   Lab Units 10/26/23  2200 10/25/23  1632   ANAEROBIC CULTURE  Culture results to follow. No anaerobes isolated       Imaging: I have personally reviewed pertinent films in PACS  EKG, Pathology, and Other Studies: I have personally reviewed pertinent reports. ** Please Note: Portions of the record may have been created with voice recognition software. Occasional wrong word or "sound a like" substitutions may have occurred due to the inherent limitations of voice recognition software. Read the chart carefully and recognize, using context, where substitutions have occurred.  **

## 2023-10-28 NOTE — CONSULTS
Salvador Keys is a 72 y.o. male who was receiving Vancomycin IV with management by the Pharmacy Consult service for treatment of Soft tissue (goal -600, trough >10)  . The patient's Vancomycin therapy has been completed / discontinued. Thank you for allowing us to take part in this patient's care. Pharmacy will sign-off now; please call or re-consult if there are any questions. Kay Abel, PharmD.  [address] [facility]

## 2023-10-29 LAB
ANION GAP SERPL CALCULATED.3IONS-SCNC: 10 MMOL/L
BACTERIA BLD CULT: NORMAL
BACTERIA BLD CULT: NORMAL
BACTERIA TISS AEROBE CULT: ABNORMAL
BACTERIA TISS AEROBE CULT: ABNORMAL
BACTERIA UR CULT: ABNORMAL
BASOPHILS # BLD AUTO: 0.02 THOUSANDS/ÂΜL (ref 0–0.1)
BASOPHILS NFR BLD AUTO: 0 % (ref 0–1)
BUN SERPL-MCNC: 13 MG/DL (ref 5–25)
CALCIUM SERPL-MCNC: 8.4 MG/DL (ref 8.4–10.2)
CHLORIDE SERPL-SCNC: 106 MMOL/L (ref 96–108)
CO2 SERPL-SCNC: 23 MMOL/L (ref 21–32)
CREAT SERPL-MCNC: 0.82 MG/DL (ref 0.6–1.3)
EOSINOPHIL # BLD AUTO: 0.24 THOUSAND/ÂΜL (ref 0–0.61)
EOSINOPHIL NFR BLD AUTO: 3 % (ref 0–6)
ERYTHROCYTE [DISTWIDTH] IN BLOOD BY AUTOMATED COUNT: 13.6 % (ref 11.6–15.1)
GFR SERPL CREATININE-BSD FRML MDRD: 88 ML/MIN/1.73SQ M
GLUCOSE SERPL-MCNC: 147 MG/DL (ref 65–140)
GLUCOSE SERPL-MCNC: 182 MG/DL (ref 65–140)
GLUCOSE SERPL-MCNC: 205 MG/DL (ref 65–140)
GLUCOSE SERPL-MCNC: 260 MG/DL (ref 65–140)
GLUCOSE SERPL-MCNC: 311 MG/DL (ref 65–140)
GRAM STN SPEC: ABNORMAL
GRAM STN SPEC: ABNORMAL
HCT VFR BLD AUTO: 38.3 % (ref 36.5–49.3)
HGB BLD-MCNC: 12.7 G/DL (ref 12–17)
IMM GRANULOCYTES # BLD AUTO: 0.04 THOUSAND/UL (ref 0–0.2)
IMM GRANULOCYTES NFR BLD AUTO: 0 % (ref 0–2)
LYMPHOCYTES # BLD AUTO: 1.99 THOUSANDS/ÂΜL (ref 0.6–4.47)
LYMPHOCYTES NFR BLD AUTO: 21 % (ref 14–44)
MCH RBC QN AUTO: 31.9 PG (ref 26.8–34.3)
MCHC RBC AUTO-ENTMCNC: 33.2 G/DL (ref 31.4–37.4)
MCV RBC AUTO: 96 FL (ref 82–98)
MONOCYTES # BLD AUTO: 0.78 THOUSAND/ÂΜL (ref 0.17–1.22)
MONOCYTES NFR BLD AUTO: 8 % (ref 4–12)
NEUTROPHILS # BLD AUTO: 6.31 THOUSANDS/ÂΜL (ref 1.85–7.62)
NEUTS SEG NFR BLD AUTO: 68 % (ref 43–75)
NRBC BLD AUTO-RTO: 0 /100 WBCS
PLATELET # BLD AUTO: 231 THOUSANDS/UL (ref 149–390)
PMV BLD AUTO: 10.2 FL (ref 8.9–12.7)
POTASSIUM SERPL-SCNC: 3.9 MMOL/L (ref 3.5–5.3)
RBC # BLD AUTO: 3.98 MILLION/UL (ref 3.88–5.62)
SODIUM SERPL-SCNC: 139 MMOL/L (ref 135–147)
WBC # BLD AUTO: 9.38 THOUSAND/UL (ref 4.31–10.16)

## 2023-10-29 PROCEDURE — 82948 REAGENT STRIP/BLOOD GLUCOSE: CPT

## 2023-10-29 PROCEDURE — 99232 SBSQ HOSP IP/OBS MODERATE 35: CPT | Performed by: INTERNAL MEDICINE

## 2023-10-29 PROCEDURE — 85025 COMPLETE CBC W/AUTO DIFF WBC: CPT

## 2023-10-29 PROCEDURE — 80048 BASIC METABOLIC PNL TOTAL CA: CPT

## 2023-10-29 PROCEDURE — 97116 GAIT TRAINING THERAPY: CPT

## 2023-10-29 PROCEDURE — 97530 THERAPEUTIC ACTIVITIES: CPT

## 2023-10-29 PROCEDURE — NC001 PR NO CHARGE: Performed by: PODIATRIST

## 2023-10-29 RX ADMIN — TAMSULOSIN HYDROCHLORIDE 0.4 MG: 0.4 CAPSULE ORAL at 17:56

## 2023-10-29 RX ADMIN — INSULIN GLARGINE 3 UNITS: 100 INJECTION, SOLUTION SUBCUTANEOUS at 22:20

## 2023-10-29 RX ADMIN — INSULIN LISPRO 4 UNITS: 100 INJECTION, SOLUTION INTRAVENOUS; SUBCUTANEOUS at 12:58

## 2023-10-29 RX ADMIN — APIXABAN 5 MG: 5 TABLET, FILM COATED ORAL at 17:56

## 2023-10-29 RX ADMIN — DILTIAZEM HYDROCHLORIDE 120 MG: 60 TABLET, FILM COATED ORAL at 20:22

## 2023-10-29 RX ADMIN — APIXABAN 5 MG: 5 TABLET, FILM COATED ORAL at 08:22

## 2023-10-29 RX ADMIN — CEFAZOLIN SODIUM 2000 MG: 2 SOLUTION INTRAVENOUS at 13:05

## 2023-10-29 RX ADMIN — METOPROLOL SUCCINATE 100 MG: 100 TABLET, EXTENDED RELEASE ORAL at 08:22

## 2023-10-29 RX ADMIN — LISINOPRIL 40 MG: 20 TABLET ORAL at 08:22

## 2023-10-29 RX ADMIN — METOPROLOL SUCCINATE 100 MG: 100 TABLET, EXTENDED RELEASE ORAL at 20:22

## 2023-10-29 RX ADMIN — INSULIN LISPRO 4 UNITS: 100 INJECTION, SOLUTION INTRAVENOUS; SUBCUTANEOUS at 08:23

## 2023-10-29 RX ADMIN — DILTIAZEM HYDROCHLORIDE 120 MG: 60 TABLET, FILM COATED ORAL at 08:22

## 2023-10-29 RX ADMIN — INSULIN LISPRO 5 UNITS: 100 INJECTION, SOLUTION INTRAVENOUS; SUBCUTANEOUS at 17:57

## 2023-10-29 RX ADMIN — SENNOSIDES 8.6 MG: 8.6 TABLET, FILM COATED ORAL at 08:22

## 2023-10-29 RX ADMIN — CEFAZOLIN SODIUM 2000 MG: 2 SOLUTION INTRAVENOUS at 03:26

## 2023-10-29 RX ADMIN — INSULIN LISPRO 2 UNITS: 100 INJECTION, SOLUTION INTRAVENOUS; SUBCUTANEOUS at 08:23

## 2023-10-29 RX ADMIN — INSULIN LISPRO 4 UNITS: 100 INJECTION, SOLUTION INTRAVENOUS; SUBCUTANEOUS at 17:58

## 2023-10-29 RX ADMIN — INSULIN LISPRO 3 UNITS: 100 INJECTION, SOLUTION INTRAVENOUS; SUBCUTANEOUS at 12:57

## 2023-10-29 RX ADMIN — CEFAZOLIN SODIUM 2000 MG: 2 SOLUTION INTRAVENOUS at 20:22

## 2023-10-29 RX ADMIN — ATORVASTATIN CALCIUM 40 MG: 40 TABLET, FILM COATED ORAL at 08:22

## 2023-10-29 NOTE — PROGRESS NOTES
INTERNAL MEDICINE RESIDENCY PROGRESS NOTE     Name: Dora Cano. Age & Sex: 72 y.o. male   MRN: [id number]  Unit/Bed#: [facility] 824-01   Encounter: [id number]  Team: SOD Team C     PATIENT INFORMATION     Name: Dora Cano. Age & Sex: 72 y.o. male   MRN: [id number]  Hospital Stay Days: 5    ASSESSMENT/PLAN     Principal Problem:    Cellulitis of left lower extremity  Active Problems:    Hyperlipidemia    Type 2 diabetes mellitus with diabetic neuropathy ([facility])    Atrial fibrillation with RVR ([facility])    Essential hypertension    Benign prostatic hyperplasia with lower urinary tract symptoms    Sepsis ([address])    S/P transmetatarsal amputation of foot, right ([facility])      S/P transmetatarsal amputation of foot, right ([address])  Assessment & Plan  Stable    Sepsis ([address])  Assessment & Plan  On arrival patient met SIRS criteria with T 101.4, . Labs were significant for leukocytosis of 15.08. Lactic acid was normal. Procal negative. CRP and ESR elevated. Preliminary read of XR looks normal with no signs of osteomyelitis. MRI obtained on 10/25/23 demonstrated no definite presence of osteomyelitis. Now s/p I&D with Podiatry. Patient's signs of infection continue to improve - BC remain negative, wound cultures growing Staph aureus resistant to ampicillin but susceptible to cefazolin. Other cultures pending. Plan:  -Follow-up remaining cultures, blood cultures negative x2  -Given resistance profile, will switch to Ancef  -Trend WBC, fever curves  -Tylenol PRN for fever, pain  -See plan under "cellulitis of left lower extremity"    Benign prostatic hyperplasia with lower urinary tract symptoms  Assessment & Plan  Plan:  -Continue home Flomax 0.4 mg daily      Essential hypertension  Assessment & Plan  /74 on arrival. Home regimen: Cardizem 90 mg BID, Lisinopril 40 mg daily, Metoprolol XL 75 mg BID. BP relatively well-controlled at present.     Plan:  -Continue to monitor BP  -Continue cardizem 120 mg BID  -Continue metoprolol 100 mg BID    Atrial fibrillation with RVR ([facility])  Assessment & Plan  EKG on arrival significant for "Afib RVR, incomplete RBBB, septal infarct (age undetermined). " Home regimen includes: Cardizem 90 mg BID, Eliquis 5 mg BID, Metoprolol 75 mg BID. Tachycardia partially 2/2 sepsis due to cellulitis. On 10/25/23, patient went into Afib with heart rates ranging in the high 90s to 130s. Per nursing, patient had heart rates as high as in the 160s last night. Metoprolol dose was increased to 100 mg BID on 10/25/23. Cardizem increased on 10/26 due to ongoing issues with rate control. Rates remain improved on current doses, most rates  on telemetry. Plan:  -Continue home Eliquis 5 mg BID, [id number] score 5  -Will continue Metoprolol to 100 mg BID, Cardizem 120 BID; if ongoing issues with rate control, will consider further uptitration of medications; however, expect rates to improve as infection continues to resolve  -Monitor on telemetry       Type 2 diabetes mellitus with diabetic neuropathy [facility])  Assessment & Plan  Lab Results   Component Value Date    HGBA1C 9.8 (H) 10/25/2023       Recent Labs     10/28/23  1600 10/28/23  2110 10/29/23  0715 10/29/23  1112   POCGLU 226* 222* 205* 260*       Blood Sugar Average: Last 72 hrs:    Patient states he currently uses Lantus 41 units PM, Metformin 500 mg BID - on further review of his chart, this does not appear to be accurate. Plan:  -Monitor BG  -Recently increased insulin regimen: 6 units lantus qHS with 4 units with meals, plus correctional as needed  - ? Consider increasing qAC insulin. Prandial BG still well outside of goal  -Carb-controlled diet         Hyperlipidemia  Assessment & Plan  Plan:  -Continue home Lipitor 40 mg daily     * Cellulitis of left lower extremity  Assessment & Plan  Patient presented to ED with 1-day of increased erythema, warmth, and pain in LLE.  He states he is unsure if he sustained a puncture wound on the bottom of his foot. He thinks it's possible he may have stepped on a nail when helping his daughter renovate her home. He did endorses some blood on his sock. Patient does have significant diabetic polyneuropathy and has undergone a transmetatarsal amputation on right foot in 2021. Patient was scheduled to see podiatry but advised to come to the hospital instead. On arrival patient met SIRS criteria with T 101.4, . Labs were significant for leukocytosis of 15.08. Lactic acid was normal. Procal negative. CRP and ESR elevated. Preliminary read of XR looks normal with no signs of osteomyelitis. S/p IV vancomycin and cefepime x1, 1 L NS, Tylenol 975 mg in ED. On physical exam, LLE had significant erythema, swelling, and warmth. Patient states pain has improved. Puncture wound noted on sole of left foot. Blood cultures still pending; per recommendation from Pharmacy, will continue IV vancomycin and will d/c IV cefepime. X-ray of the left lower extremity indicated no acute osseous abnormality. Arterial dopplers from 10/25/23 demonstrated bilateral atherosclerotic disease of the femoral-popliteal arteries without focal stenosis. Left lower extremity great toe pressure was 52 mmHg, which was determined to be marginally within the healing range for a diabetic. Podiatry saw patient 10/25/23 for wound debridement on the plantar aspect of the fourth metatarsal. Wound probed to bone, raising concern for osteomyelitis. MRI obtained did not determine any determinate evidence of osteomyelitis, but given the clinical picture and the wound probing to bone, will empirically treat for osteomyelitis. Patient went to OR for a left foot washout and debridement for source control 10/26/23: "Patient underwent surgical washout/debridement of the wound on the plantar aspect of the fourth metatarsal. While debriding the wound, a 2 mm piece of glass was removed from the wound cavity, and source control was obtained.  No abscesses, sinus tracts, or osteomyelitis observed during the procedure." Patient had no complications and feels good as of 10/27/23. Patient to continues to improve clinically. Plan:  - For OR tomorrow (~1700) per Podiatry. To be NPO @ [facility]. -Switch to Ancef for further antibiotic therapy given resistance profile  -Follow-up pending blood cultures and wound cultures - negative at 72 hours x2 [facility]), other cultures pending  -TD given  -Monitor WBC and fever curves   -Tylenol PRN for fever, pain  -Podiatry on board in hospital evaluation and management, assistance appreciated - per them, plan for delayed closure once signs of infection improved  -Outpatient follow-up with podiatry recommended        Disposition: For OR tomorrow per Podiatry. To analilia-address dispo tomorrow after surgery. SUBJECTIVE / INTERVAL HISTORY     NAEO, Mr Juan Carlos Alejandre was seen resting comfortably in bed earlier this morning. He has no new complaints or concerns, and is anxious regarding Podiatry's plans for OR. Review of Systems   Constitutional:  Negative for chills and fever. HENT:  Negative for ear pain and sore throat. Eyes:  Negative for pain and visual disturbance. Respiratory:  Negative for cough and shortness of breath. Cardiovascular:  Negative for chest pain and palpitations. Gastrointestinal:  Negative for abdominal pain and vomiting. Genitourinary:  Negative for dysuria and hematuria. Musculoskeletal:  Negative for arthralgias and back pain. Skin:  Negative for color change and rash. Neurological:  Negative for seizures and syncope. All other systems reviewed and are negative.       OBJECTIVE     Vitals:    10/28/23 2225 10/29/23 0330 10/29/23 0716 10/29/23 1114   BP: 136/92 133/90 162/95 144/91   Pulse: 81  100    Resp: 18 20 16 16   Temp: 98.8 °F (37.1 °C) 99.8 °F (37.7 °C) 98.4 °F (36.9 °C) 98.5 °F (36.9 °C)   TempSrc:   Oral    SpO2: 98%  94%    Weight:   107 kg (235 lb)    Height:   6' 4" (1.93 m) Temperature:   Temp (24hrs), Av.9 °F (37.2 °C), Min:98.4 °F (36.9 °C), Max:99.8 °F (37.7 °C)    Temperature: 98.5 °F (36.9 °C)    Intake & Output:  I/O         10/27 0701  10/28 0700 10/28 0701  10/29 0700 10/29 0701  10/30 0700    P. O.  616 180    I.V. (mL/kg)       Total Intake(mL/kg)  616 180 (1.7)    Urine (mL/kg/hr)  1700 400 (0.5)    Total Output  1700 400    Net  -1084 -220                   Weights:   IBW (Ideal Body Weight): 86.8 kg    Body mass index is 28.61 kg/m². Weight (last 2 days)       Date/Time Weight    10/29/23 07:16:38 107 (235)              Physical Exam:     Physical Exam  Vitals and nursing note reviewed. Constitutional:       General: He is not in acute distress. Appearance: He is well-developed. HENT:      Head: Normocephalic and atraumatic. Eyes:      Conjunctiva/sclera: Conjunctivae normal.   Cardiovascular:      Rate and Rhythm: Normal rate and regular rhythm. Heart sounds: No murmur heard. Pulmonary:      Effort: Pulmonary effort is normal. No respiratory distress. Breath sounds: Normal breath sounds. Abdominal:      Palpations: Abdomen is soft. Tenderness: There is no abdominal tenderness. Musculoskeletal:         General: No swelling. Cervical back: Neck supple. Skin:     General: Skin is warm and dry. Capillary Refill: Capillary refill takes less than 2 seconds. Neurological:      Mental Status: He is alert. Psychiatric:         Mood and Affect: Mood normal.         LABORATORY DATA     Labs: I have personally reviewed pertinent reports.   Results from last 7 days   Lab Units 10/29/23  0459 10/28/23  0524 10/27/23  05   WBC Thousand/uL 9.38 8.86 11.56*   HEMOGLOBIN g/dL 12.7 11.8* 12.4   HEMATOCRIT % 38.3 36.0* 39.2   PLATELETS Thousands/uL 231 222 201   NEUTROS PCT % 68 70 78*   MONOS PCT % 8 9 9   EOS PCT % 3 2 1      Results from last 7 days   Lab Units 10/29/23  0459 10/28/23  0524 10/27/23  0532 10/26/23  0548 10/25/23  4751 10/24/23  1137   POTASSIUM mmol/L 3.9 4.0 4.3   < > 4.1 4.4   CHLORIDE mmol/L 106 106 106   < > 105 104   CO2 mmol/L 23 23 21   < > 25 24   BUN mg/dL 13 14 17   < > 14 19   CREATININE mg/dL 0.82 0.84 0.94   < > 0.92 0.94   CALCIUM mg/dL 8.4 8.0* 8.1*   < > 9.0 9.1   ALK PHOS U/L  --   --   --   --  85 89   ALT U/L  --   --   --   --  20 17   AST U/L  --   --   --   --  14 13    < > = values in this interval not displayed. Results from last 7 days   Lab Units 10/24/23  1137   INR  1.37*   PTT seconds 33     Results from last 7 days   Lab Units 10/24/23  1137   LACTIC ACID mmol/L 1.0           IMAGING & DIAGNOSTIC TESTING     Radiology Results: I have personally reviewed pertinent reports. XR foot left 3+ views    Result Date: 10/27/2023  Impression: Possible packing material plantar aspect of fourth and fifth toe. No acute osseous abnormality. Workstation performed: [id number]     MRI foot/forefoot toes left w wo contrast    Result Date: 10/26/2023  Impression: Inhomogeneous fat suppression about the distal lateral foot. Motion degradation. Within the limitations of the examination, no definite abnormal marrow signal to suggest acute osteomyelitis. Findings suggesting cellulitis along the dorsum of the foot without abscess. Additional callus with ulceration along the plantar aspect of the fourth metatarsal head. Workstation performed: [id number]     XR foot 3+ views LEFT    Result Date: 10/25/2023  Impression: No acute osseous abnormality. Workstation performed: [id number]     Other Diagnostic Testing: I have personally reviewed pertinent reports.     ACTIVE MEDICATIONS     Current Facility-Administered Medications   Medication Dose Route Frequency    acetaminophen (TYLENOL) tablet 975 mg  975 mg Oral Q6H PRN    apixaban (ELIQUIS) tablet 5 mg  5 mg Oral BID    atorvastatin (LIPITOR) tablet 40 mg  40 mg Oral Daily    ceFAZolin (ANCEF) IVPB (premix in dextrose) 2,000 mg 50 mL  2,000 mg Intravenous Q8H diltiazem (CARDIZEM) tablet 120 mg  120 mg Oral BID    insulin glargine (LANTUS) subcutaneous injection 6 Units 0.06 mL  6 Units Subcutaneous HS    insulin lispro (HumaLOG) 100 units/mL subcutaneous injection 1-6 Units  1-6 Units Subcutaneous TID AC    insulin lispro (HumaLOG) 100 units/mL subcutaneous injection 1-6 Units  1-6 Units Subcutaneous HS    insulin lispro (HumaLOG) 100 units/mL subcutaneous injection 4 Units  4 Units Subcutaneous TID With Meals    lisinopril (ZESTRIL) tablet 40 mg  40 mg Oral Daily    metoprolol succinate (TOPROL-XL) 24 hr tablet 100 mg  100 mg Oral BID    oxyCODONE-acetaminophen (PERCOCET) 5-325 mg per tablet 1 tablet  1 tablet Oral Q4H PRN    polyethylene glycol (MIRALAX) packet 17 g  17 g Oral Daily PRN    senna (SENOKOT) tablet 8.6 mg  1 tablet Oral Daily    tamsulosin (FLOMAX) capsule 0.4 mg  0.4 mg Oral Daily With Dinner       VTE Pharmacologic Prophylaxis: {Eliquis  VTE Mechanical Prophylaxis: sequential compression device    Portions of the record may have been created with voice recognition software. Occasional wrong word or "sound a like" substitutions may have occurred due to the inherent limitations of voice recognition software. Read the chart carefully and recognize, using context, where substitutions have occurred.    == == == == == == == == == == == == == == == == == == == == ==     [employer].  Saul, [address], [facility]  Internal Medicine Resident, PGY-2

## 2023-10-29 NOTE — PLAN OF CARE
Problem: PHYSICAL THERAPY ADULT  Goal: Performs mobility at highest level of function for planned discharge setting. See evaluation for individualized goals. Description: Treatment/Interventions: OT, Spoke to nursing, Gait training  Equipment Recommended: James Kruger       See flowsheet documentation for full assessment, interventions and recommendations. Outcome: Progressing  Note: Prognosis: Fair  Problem List: Decreased endurance, Decreased mobility, Pain, Decreased skin integrity  Assessment: Patient received in bed. Patient agreeable to therapy. Patient performs bed mobility with supervision/set-up . Patient performs functional transfers with CGA- minimal assistance x1 using rolling walker, maintains NWB LLE. Patient performs ambulation with CGA-minimal assistance x1 using rolling walker, 10'x2. Patient education on mobility with AD and equipment use at home. Patient also educated on therapeutic exercises to perform 3x/day, 20 reps per exercise. Patient states agreement to complete activity on own as well as with therapy. Patient left in bedside chair with all needs met and call bell/personal items within reach. The patient's AM-PAC Basic Mobility Inpatient Short Form Raw Score is 17 showing further need for skilled PT services in order to improve functional mobility, decrease need for assistance, and return to PLOF. A Raw score of greater than or equal to 16 suggests the patient may benefit from discharge to home. PT continues to recommend Home with Home Physical Therapy on d/c. Will continue to follow as able. PT Discharge Recommendation: Home with home health rehabilitation (+ family support)    See flowsheet documentation for full assessment.

## 2023-10-29 NOTE — PLAN OF CARE
Problem: PAIN - ADULT  Goal: Verbalizes/displays adequate comfort level or baseline comfort level  Description: Interventions:  - Encourage patient to monitor pain and request assistance  - Assess pain using appropriate pain scale  - Administer analgesics based on type and severity of pain and evaluate response  - Implement non-pharmacological measures as appropriate and evaluate response  - Consider cultural and social influences on pain and pain management  - Notify physician/advanced practitioner if interventions unsuccessful or patient reports new pain  Outcome: Progressing     Problem: INFECTION - ADULT  Goal: Absence or prevention of progression during hospitalization  Description: INTERVENTIONS:  - Assess and monitor for signs and symptoms of infection  - Monitor lab/diagnostic results  - Monitor all insertion sites, i.e. indwelling lines, tubes, and drains  - Monitor endotracheal if appropriate and nasal secretions for changes in amount and color  - [place] appropriate cooling/warming therapies per order  - Administer medications as ordered  - Instruct and encourage patient and family to use good hand hygiene technique  - Identify and instruct in appropriate isolation precautions for identified infection/condition  Outcome: Progressing  Goal: Absence of fever/infection during neutropenic period  Description: INTERVENTIONS:  - Monitor WBC    Outcome: Progressing     Problem: Nutrition/Hydration-ADULT  Goal: Nutrient/Hydration intake appropriate for improving, restoring or maintaining nutritional needs  Description: Monitor and assess patient's nutrition/hydration status for malnutrition. Collaborate with interdisciplinary team and initiate plan and interventions as ordered. Monitor patient's weight and dietary intake as ordered or per policy. Utilize nutrition screening tool and intervene as necessary. Determine patient's food preferences and provide high-protein, high-caloric foods as appropriate. INTERVENTIONS:  - Monitor oral intake, urinary output, labs, and treatment plans  - Assess nutrition and hydration status and recommend course of action  - Evaluate amount of meals eaten  - Assist patient with eating if necessary   - Allow adequate time for meals  - Recommend/ encourage appropriate diets, oral nutritional supplements, and vitamin/mineral supplements  - Order, calculate, and assess calorie counts as needed  - Recommend, monitor, and adjust tube feedings and TPN/PPN based on assessed needs  - Assess need for intravenous fluids  - Provide specific nutrition/hydration education as appropriate  - Include patient/family/caregiver in decisions related to nutrition  Outcome: Progressing

## 2023-10-29 NOTE — PLAN OF CARE
Problem: PAIN - ADULT  Goal: Verbalizes/displays adequate comfort level or baseline comfort level  Description: Interventions:  - Encourage patient to monitor pain and request assistance  - Assess pain using appropriate pain scale  - Administer analgesics based on type and severity of pain and evaluate response  - Implement non-pharmacological measures as appropriate and evaluate response  - Consider cultural and social influences on pain and pain management  - Notify physician/advanced practitioner if interventions unsuccessful or patient reports new pain  Outcome: Progressing     Problem: INFECTION - ADULT  Goal: Absence or prevention of progression during hospitalization  Description: INTERVENTIONS:  - Assess and monitor for signs and symptoms of infection  - Monitor lab/diagnostic results  - Monitor all insertion sites, i.e. indwelling lines, tubes, and drains  - Monitor endotracheal if appropriate and nasal secretions for changes in amount and color  - [place] appropriate cooling/warming therapies per order  - Administer medications as ordered  - Instruct and encourage patient and family to use good hand hygiene technique  - Identify and instruct in appropriate isolation precautions for identified infection/condition  Outcome: Progressing     Problem: SAFETY ADULT  Goal: Patient will remain free of falls  Description: INTERVENTIONS:  - Educate patient/family on patient safety including physical limitations  - Instruct patient to call for assistance with activity   - Consult OT/PT to assist with strengthening/mobility   - Keep Call bell within reach  - Keep bed low and locked with side rails adjusted as appropriate  - Keep care items and personal belongings within reach  - Initiate and maintain comfort rounds  - Make Fall Risk Sign visible to staff  - Obtain necessary fall risk management equipment: RW  - Apply yellow socks and bracelet for high fall risk patients  - Consider moving patient to room near nurses station  Outcome: Progressing     Problem: Knowledge Deficit  Goal: Patient/family/caregiver demonstrates understanding of disease process, treatment plan, medications, and discharge instructions  Description: Complete learning assessment and assess knowledge base. Interventions:  - Provide teaching at level of understanding  - Provide teaching via preferred learning methods  Outcome: Progressing     Problem: Prexisting or High Potential for Compromised Skin Integrity  Goal: Skin integrity is maintained or improved  Description: INTERVENTIONS:  - Identify patients at risk for skin breakdown  - Assess and monitor skin integrity  - Assess and monitor nutrition and hydration status  - Monitor labs   - Assess for incontinence   - Turn and reposition patient  - Assist with mobility/ambulation  - Relieve pressure over bony prominences  - Avoid friction and shearing  - Provide appropriate hygiene as needed including keeping skin clean and dry  - Evaluate need for skin moisturizer/barrier cream  - Collaborate with interdisciplinary team   - Patient/family teaching  - Consider wound care consult   Outcome: Progressing     Problem: Nutrition/Hydration-ADULT  Goal: Nutrient/Hydration intake appropriate for improving, restoring or maintaining nutritional needs  Description: Monitor and assess patient's nutrition/hydration status for malnutrition. Collaborate with interdisciplinary team and initiate plan and interventions as ordered. Monitor patient's weight and dietary intake as ordered or per policy. Utilize nutrition screening tool and intervene as necessary. Determine patient's food preferences and provide high-protein, high-caloric foods as appropriate.      INTERVENTIONS:  - Monitor oral intake, urinary output, labs, and treatment plans  - Assess nutrition and hydration status and recommend course of action  - Evaluate amount of meals eaten  - Assist patient with eating if necessary   - Allow adequate time for meals  - Recommend/ encourage appropriate diets, oral nutritional supplements, and vitamin/mineral supplements  - Order, calculate, and assess calorie counts as needed  - Recommend, monitor, and adjust tube feedings and TPN/PPN based on assessed needs  - Assess need for intravenous fluids  - Provide specific nutrition/hydration education as appropriate  - Include patient/family/caregiver in decisions related to nutrition  Outcome: Progressing     Problem: MOBILITY - ADULT  Goal: Maintain or return to baseline ADL function  Description: INTERVENTIONS:  -  Assess patient's ability to carry out ADLs; assess patient's baseline for ADL function and identify physical deficits which impact ability to perform ADLs (bathing, care of mouth/teeth, toileting, grooming, dressing, etc.)  - Assess/evaluate cause of self-care deficits   - Assess range of motion  - Assess patient's mobility; develop plan if impaired  - Assess patient's need for assistive devices and provide as appropriate  - Encourage maximum independence but intervene and supervise when necessary  - Involve family in performance of ADLs  - Assess for home care needs following discharge   - Consider OT consult to assist with ADL evaluation and planning for discharge  - Provide patient education as appropriate  Outcome: Progressing

## 2023-10-29 NOTE — PHYSICAL THERAPY NOTE
PHYSICAL THERAPY NOTE          Patient Name: Xavier Brown. Today's Date: 10/29/2023       10/29/23 1108   PT Last Visit   PT Visit Date 10/29/23   Note Type   Note Type Treatment   Pain Assessment   Pain Assessment Tool 0-10   Pain Score No Pain   Restrictions/Precautions   Weight Bearing Precautions Per Order (S)  Yes   LLE Weight Bearing Per Order (S)  NWB   Braces or Orthoses Other (Comment)  (B/L Custom boots)   Other Precautions WBS; Multiple lines; Fall Risk;Pain;Telemetry   General   Chart Reviewed Yes   Family/Caregiver Present No   Cognition   Overall Cognitive Status WFL   Arousal/Participation Cooperative; Alert   Attention Within functional limits   Orientation Level Oriented X4   Memory Within functional limits   Following Commands Follows all commands and directions without difficulty   Comments Patient pleasant and cooperative   Subjective   Subjective Patient agreeable to PT tx   Bed Mobility   Supine to Sit 5  Supervision   Transfers   Sit to Stand   (CGA-Min A)   Additional items Assist x 1; Increased time required   Stand to Sit   (CGA-Min A)   Additional items Assist x 1; Increased time required   Additional Comments RW - maintains NWB   Ambulation/Elevation   Gait pattern Forward Flexion  (RLE hopping pattern to maintain LLE NWB)   Gait Assistance   (CGA)   Additional items Assist x 1;Verbal cues   Assistive Device Rolling walker   Distance 10'x2   Balance   Static Sitting Fair +   Dynamic Sitting Fair   Static Standing Fair -   Dynamic Standing Poor +   Ambulatory Poor +   Endurance Deficit   Endurance Deficit Yes   Activity Tolerance   Activity Tolerance Patient tolerated treatment well   Nurse Made Aware RN cleared   Assessment   Prognosis Fair   Problem List Decreased endurance;Decreased mobility;Pain;Decreased skin integrity   Assessment Patient received in bed. Patient agreeable to therapy.  Patient performs bed mobility with supervision/set-up . Patient performs functional transfers with CGA- minimal assistance x1 using rolling walker, maintains NWB LLE. Patient performs ambulation with CGA-minimal assistance x1 using rolling walker, 10'x2. Patient education on mobility with AD and equipment use at home. Patient also educated on therapeutic exercises to perform 3x/day, 20 reps per exercise. Patient states agreement to complete activity on own as well as with therapy. Patient left in bedside chair with all needs met and call bell/personal items within reach. The patient's AM-[facility] Basic Mobility Inpatient Short Form Raw Score is 17 showing further need for skilled PT services in order to improve functional mobility, decrease need for assistance, and return to PLOF. A Raw score of greater than or equal to 16 suggests the patient may benefit from discharge to home. PT continues to recommend Home with Home Physical Therapy on d/c. Will continue to follow as able. Goals   Patient Goals to go home soon   PT Treatment Day 2   Plan   Treatment/Interventions Functional transfer training; Therapeutic exercise; Bed mobility;Gait training;Spoke to nursing;OT   Progress Progressing toward goals   PT Frequency 2-3x/wk   Discharge Recommendation   PT Discharge Recommendation Home with home health rehabilitation  (+ family support)   Equipment Recommended Wheelchair;Walker   AM-[facility] Basic Mobility Inpatient   Turning in Flat Bed Without Bedrails 4   Lying on Back to Sitting on Edge of Flat Bed Without Bedrails 3   Moving Bed to Chair 3   Standing Up From Chair Using Arms 3   Walk in Room 3   Climb 3-5 Stairs With Railing 1   Basic Mobility Inpatient Raw Score 17   Basic Mobility Standardized Score 39.67   Highest Level Of Mobility   JH-HLM Goal 5: Stand one or more mins   JH-HLM Achieved 5: Stand (1 or more minutes)   Education   Education Provided Mobility training;Home exercise program;Assistive device   Patient Demonstrates acceptance/verbal understanding   End of Consult   Patient Position at End of Consult Bedside chair; All needs within reach       Anoop Johnston, PT, DPT

## 2023-10-29 NOTE — DISCHARGE INSTR - AVS FIRST PAGE
Discharge Instructions - Podiatry    Weight Bearing Status: Non-weight bearing to left foot                   Pain: Continue analgesics as directed    Follow-up appointment instructions: Please make an appointment within one week of discharge with Lee Crawford. Your surgeons were Dr. Evita Song and Dr. Nishant Pino. Contact sooner if any increase in pain, or signs of infection occur    Wound Care: Leave dressings clean, dry, and intact until first post operative appointment. Please change dressings if they become soiled or wet. Dressing change instructions: If dressings become soiled or wet, please change dressings. Please apply Betadine soaked adaptic. Then cover with Gauze and secure with rolled gauze and tape. Discharge Instructions- Internal Medicine  Follow-up appointment instructions: Please make an appointment to see your primary care provider, Dr. Silviano Brooks, within 1 to 2 weeks of discharge. Please also follow up with podiatry within 1 week of your discharge. If you experience any pain, increased warmth/redness, fever, chills, or significant drainage from the wound, contact podiatry as soon as possible.

## 2023-10-29 NOTE — PROGRESS NOTES
[facility] Podiatry - Progress Note  Patient: Jhonatan Oliveros. 72 y.o. male   MRN: [id number]  PCP: Bobby Ryan MD  Unit/Bed#: PPHP 824-01 Encounter: [id number]  Date Of Visit: 10/29/23    ASSESSMENT:    Jhonatan Oliveros. is a 72 y.o. male with:    Left diabetic foot ulceration - plantar lateral forefoot  -s/p left foot I&D with foreign body removal (DOS: 10/26/23)  Cellulitis of left lower extremity  Sepsis - POA  Type 2 DM with diabetic neuropathy  History of R TMA      PLAN:    POD3 left foot incision and drainage with removal of foreign body. Incision site is stable. Significant improvement noted to periwound erythema, edema and streaking. Patient to return to the OR tomorrow, 10/30 for left foot delayed primary closure pending OR availability. Case request placed. Patient is to remain NPO at midnight. Order placed to hold morning Eliquis on 10/30. Plan to continue local wound care of packing, DSD and ACE until surgical intervention. Podiatry to do all dressing changes at this time. Continue to reiterate to the patient the importance of strict nonweightbearing to the left lower extremity. Patient switched from Unasyn to Ancef. Continue IV antibiotics per primary team.  We will continue to monitor labs dose. Patient remains afebrile with no leukocytosis noted. Elevation on green foam wedges or pillows when non-ambulatory. Rest of care per primary team.    Weight bearing status: Non weight bearing to left lower extremity      SUBJECTIVE:     The patient was seen, evaluated, and assessed at bedside today. The patient was awake, alert, and in no acute distress. No acute events overnight. The patient reports he has minimal pain to the left foot today. Patient denies N/V/F/chills/SOB/CP.       OBJECTIVE:     Vitals:   /95   Pulse 100   Temp 98.4 °F (36.9 °C) (Oral)   Resp 16   Ht 6' 4" (1.93 m)   Wt 107 kg (235 lb)   SpO2 94%   BMI 28.61 kg/m²     Temp (24hrs), Av °F (37.2 °C), Min:98.4 °F (36.9 °C), Max:99.8 °F (37.7 °C)      Physical Exam:     General:  Alert, cooperative, and in no distress. Lower extremity exam:  Cardiovascular status at baseline. Neurological status at baseline. Musculoskeletal status at baseline on admission. No calf tenderness noted. Lower extremity wound(s) as noted below:  Wound #: 1  Location: Left plantar lateral forefoot   Length 5.5 cm: Width 0.5 cm: Depth 1.5 cm:   Deepest Tissue Noted in Base: Surgical bone   Probe to Bone: Yes  Peripheral Skin Description: Attached  Granulation: 40% Fibrotic Tissue: 60% Necrotic Tissue: 0%   Drainage Amount: minimal, serosanguinous  Signs of Infection: No    Clinical Images 10/29/23:                Additional Data:     Labs:    Results from last 7 days   Lab Units 10/29/23  0459   WBC Thousand/uL 9.38   HEMOGLOBIN g/dL 12.7   HEMATOCRIT % 38.3   PLATELETS Thousands/uL 231   NEUTROS PCT % 68   LYMPHS PCT % 21   MONOS PCT % 8   EOS PCT % 3     Results from last 7 days   Lab Units 10/29/23  0459 10/26/23  0548 10/25/23  0620   POTASSIUM mmol/L 3.9   < > 4.1   CHLORIDE mmol/L 106   < > 105   CO2 mmol/L 23   < > 25   BUN mg/dL 13   < > 14   CREATININE mg/dL 0.82   < > 0.92   CALCIUM mg/dL 8.4   < > 9.0   ALK PHOS U/L  --   --  85   ALT U/L  --   --  20   AST U/L  --   --  14    < > = values in this interval not displayed. Results from last 7 days   Lab Units 10/24/23  1137   INR  1.37*       * I Have Reviewed All Lab Data Listed Above. Recent Cultures (last 7 days):     Results from last 7 days   Lab Units 10/26/23  2200 10/25/23  1632 10/24/23  1205 10/24/23  1142 10/24/23  1137   BLOOD CULTURE   --   --   --  No Growth After 4 Days. No Growth After 4 Days. GRAM STAIN RESULT  1+ Polys  No bacteria seen No Polys or Bacteria seen  --   --   --    URINE CULTURE   --   --  Culture results to follow.   --   --    WOUND CULTURE   --  1+ Growth of Staphylococcus aureus*  1+ Growth of  --   --   --      Results from last 7 days   Lab Units 10/26/23  2200 10/25/23  1632   ANAEROBIC CULTURE  Culture results to follow. No anaerobes isolated       Imaging: I have personally reviewed pertinent films in PACS  EKG, Pathology, and Other Studies: I have personally reviewed pertinent reports. ** Please Note: Portions of the record may have been created with voice recognition software. Occasional wrong word or "sound a like" substitutions may have occurred due to the inherent limitations of voice recognition software. Read the chart carefully and recognize, using context, where substitutions have occurred.  **

## 2023-10-30 ENCOUNTER — ANESTHESIA (INPATIENT)
Dept: PERIOP | Facility: HOSPITAL | Age: 73
DRG: 854 | End: 2023-10-30
Payer: COMMERCIAL

## 2023-10-30 ENCOUNTER — ANESTHESIA EVENT (INPATIENT)
Dept: PERIOP | Facility: HOSPITAL | Age: 73
DRG: 854 | End: 2023-10-30
Payer: COMMERCIAL

## 2023-10-30 LAB
BACTERIA SPEC ANAEROBE CULT: NORMAL
GLUCOSE SERPL-MCNC: 167 MG/DL (ref 65–140)
GLUCOSE SERPL-MCNC: 187 MG/DL (ref 65–140)
GLUCOSE SERPL-MCNC: 226 MG/DL (ref 65–140)
GLUCOSE SERPL-MCNC: 249 MG/DL (ref 65–140)

## 2023-10-30 PROCEDURE — NC001 PR NO CHARGE: Performed by: PODIATRIST

## 2023-10-30 PROCEDURE — 13160 SEC CLSR SURG WND/DEHSN XTN: CPT | Performed by: PODIATRIST

## 2023-10-30 PROCEDURE — 82948 REAGENT STRIP/BLOOD GLUCOSE: CPT

## 2023-10-30 PROCEDURE — 0HBNXZZ EXCISION OF LEFT FOOT SKIN, EXTERNAL APPROACH: ICD-10-PCS | Performed by: PODIATRIST

## 2023-10-30 PROCEDURE — 99232 SBSQ HOSP IP/OBS MODERATE 35: CPT | Performed by: INTERNAL MEDICINE

## 2023-10-30 PROCEDURE — 0JQR0ZZ REPAIR LEFT FOOT SUBCUTANEOUS TISSUE AND FASCIA, OPEN APPROACH: ICD-10-PCS | Performed by: PODIATRIST

## 2023-10-30 RX ORDER — MIDAZOLAM HYDROCHLORIDE 2 MG/2ML
INJECTION, SOLUTION INTRAMUSCULAR; INTRAVENOUS AS NEEDED
Status: DISCONTINUED | OUTPATIENT
Start: 2023-10-30 | End: 2023-10-30

## 2023-10-30 RX ORDER — FENTANYL CITRATE/PF 50 MCG/ML
25 SYRINGE (ML) INJECTION
Status: DISCONTINUED | OUTPATIENT
Start: 2023-10-30 | End: 2023-10-30 | Stop reason: HOSPADM

## 2023-10-30 RX ORDER — ONDANSETRON 2 MG/ML
INJECTION INTRAMUSCULAR; INTRAVENOUS AS NEEDED
Status: DISCONTINUED | OUTPATIENT
Start: 2023-10-30 | End: 2023-10-30

## 2023-10-30 RX ORDER — SODIUM CHLORIDE, SODIUM LACTATE, POTASSIUM CHLORIDE, CALCIUM CHLORIDE 600; 310; 30; 20 MG/100ML; MG/100ML; MG/100ML; MG/100ML
INJECTION, SOLUTION INTRAVENOUS CONTINUOUS PRN
Status: DISCONTINUED | OUTPATIENT
Start: 2023-10-30 | End: 2023-10-30

## 2023-10-30 RX ORDER — DIPHENHYDRAMINE HYDROCHLORIDE 50 MG/ML
12.5 INJECTION INTRAMUSCULAR; INTRAVENOUS ONCE AS NEEDED
Status: DISCONTINUED | OUTPATIENT
Start: 2023-10-30 | End: 2023-10-30 | Stop reason: HOSPADM

## 2023-10-30 RX ORDER — PROPOFOL 10 MG/ML
INJECTION, EMULSION INTRAVENOUS CONTINUOUS PRN
Status: DISCONTINUED | OUTPATIENT
Start: 2023-10-30 | End: 2023-10-30

## 2023-10-30 RX ADMIN — ONDANSETRON 4 MG: 2 INJECTION INTRAMUSCULAR; INTRAVENOUS at 19:25

## 2023-10-30 RX ADMIN — INSULIN LISPRO 2 UNITS: 100 INJECTION, SOLUTION INTRAVENOUS; SUBCUTANEOUS at 12:29

## 2023-10-30 RX ADMIN — ATORVASTATIN CALCIUM 40 MG: 40 TABLET, FILM COATED ORAL at 08:13

## 2023-10-30 RX ADMIN — LISINOPRIL 40 MG: 20 TABLET ORAL at 08:13

## 2023-10-30 RX ADMIN — MIDAZOLAM 2 MG: 1 INJECTION INTRAMUSCULAR; INTRAVENOUS at 19:19

## 2023-10-30 RX ADMIN — INSULIN LISPRO 3 UNITS: 100 INJECTION, SOLUTION INTRAVENOUS; SUBCUTANEOUS at 08:15

## 2023-10-30 RX ADMIN — PROPOFOL 50 MCG/KG/MIN: 10 INJECTION, EMULSION INTRAVENOUS at 19:23

## 2023-10-30 RX ADMIN — INSULIN GLARGINE 6 UNITS: 100 INJECTION, SOLUTION SUBCUTANEOUS at 20:48

## 2023-10-30 RX ADMIN — CEFAZOLIN SODIUM 2000 MG: 2 SOLUTION INTRAVENOUS at 19:09

## 2023-10-30 RX ADMIN — DILTIAZEM HYDROCHLORIDE 120 MG: 60 TABLET, FILM COATED ORAL at 08:14

## 2023-10-30 RX ADMIN — ACETAMINOPHEN 975 MG: 325 TABLET, FILM COATED ORAL at 18:38

## 2023-10-30 RX ADMIN — SENNOSIDES 8.6 MG: 8.6 TABLET, FILM COATED ORAL at 08:13

## 2023-10-30 RX ADMIN — METOPROLOL SUCCINATE 100 MG: 100 TABLET, EXTENDED RELEASE ORAL at 08:13

## 2023-10-30 RX ADMIN — INSULIN LISPRO 1 UNITS: 100 INJECTION, SOLUTION INTRAVENOUS; SUBCUTANEOUS at 20:48

## 2023-10-30 RX ADMIN — CEFAZOLIN SODIUM 2000 MG: 2 SOLUTION INTRAVENOUS at 04:04

## 2023-10-30 RX ADMIN — CEFAZOLIN SODIUM 2000 MG: 2 SOLUTION INTRAVENOUS at 11:38

## 2023-10-30 RX ADMIN — DILTIAZEM HYDROCHLORIDE 120 MG: 60 TABLET, FILM COATED ORAL at 20:47

## 2023-10-30 RX ADMIN — SODIUM CHLORIDE, SODIUM LACTATE, POTASSIUM CHLORIDE, AND CALCIUM CHLORIDE: .6; .31; .03; .02 INJECTION, SOLUTION INTRAVENOUS at 19:16

## 2023-10-30 RX ADMIN — METOPROLOL SUCCINATE 100 MG: 100 TABLET, EXTENDED RELEASE ORAL at 20:47

## 2023-10-30 NOTE — OP NOTE
OPERATIVE REPORT - Podiatry  PATIENT NAME: Bryanna Yap. :  1950  MRN: [id number]  Pt Location: [facility] OR ROOM 07    SURGERY DATE: 10/30/2023    Surgeon(s) and Role:     * Anali Penaloza DPM - Primary     * Uyen Hutchinson DPM - Assisting    Pre-op Diagnosis:  Cellulitis of left lower leg [L03.116]  Diabetic ulcer of other part of left foot associated with type 2 diabetes mellitus, with bone involvement without evidence of necrosis ([address]) [O40.081, L97.526]    Post-Op Diagnosis Codes:     * Cellulitis of left lower leg [L03.116]     * Diabetic ulcer of other part of left foot associated with type 2 diabetes mellitus, with bone involvement without evidence of necrosis ([address]) [S09.142, L97.526]    Procedure(s) (LRB):  CLOSURE DELAYED PRIMARY left foot (Left)    Specimen(s):  * No specimens in log *    Estimated Blood Loss:   Minimal    Drains:  * No LDAs found *    Anesthesia Type:   Choice with 10 ml of 1% Lidocaine and 0.5% Bupivacaine in a 1:1 mixture    Hemostasis:  Electrocautery  Atraumatic technique    Materials:  * No implants in log *      Operative Findings:  Consistent with diagnosis  - No purulence, sinus tracts or necrosis encountered. Complications:   None    Procedure and Technique:     Under mild sedation, the patient was brought into the operating room and remained on bed in supine position. IV sedation was achieved by anesthesia team and a universal timeout was performed where all parties are in agreement of correct patient, correct procedure and correct site. A local block was performed consisting of 10 ml of 1% Lidocaine and 0.5% Bupivacaine in a 1:1 mixture. The foot was then prepped and draped in the usual aseptic manner. Attention directed to left foot pre-existing wound. Rongeur was used to remove any fibrotic subcutaneous tissue from the wound bed to the level of subcutaneous tissue.  Rongeur used to further debride the wound base, revealing fresh bleeding granular subcutaneous tissue. No bone exposed. 4th and 5th MTPJ capsules intact. Tobias elevator used to probe wound - no sinus tracts, necrosis or purulence encountered. Skin edges were excised with 15 blade, revealing fresh bleeding tissue. Skin edges reapproximated and closure obtained with Nylon. Dressed with betadine soaked adaptic, 4x4 gauze, ABD pad, Kerlix, ACE bandage. The patient tolerated the procedure and anesthesia well without immediate complications and transferred to PACU with vital signs stable. As with many limb salvage procedures, we contemplate the possibility of performing further stages to this procedure. Procedures may include debridements, delayed closure, plastic surgery techniques, or more proximal amputations. This procedure may be considered part of a multi-staged limb salvage treatment plan. Dr. Lian Lopez was present during the entire procedure and participated in all key aspects. SIGNATURE: Cathryn Dugan DPM  DATE: October 30, 2023  TIME: 7:59 PM      Portions of the record may have been created with voice recognition software. Occasional wrong word or "sound a like" substitutions may have occurred due to the inherent limitations of voice recognition software. Read the chart carefully and recognize, using context, where substitutions have occurred.

## 2023-10-30 NOTE — PHYSICAL THERAPY NOTE
PT cancel note       10/30/23 1233   PT Last Visit   PT Visit Date 10/30/23   Note Type   Note type Cancelled Session   Cancel Reasons Patient to operating room     [address]

## 2023-10-30 NOTE — PLAN OF CARE
Problem: PAIN - ADULT  Goal: Verbalizes/displays adequate comfort level or baseline comfort level  Description: Interventions:  - Encourage patient to monitor pain and request assistance  - Assess pain using appropriate pain scale  - Administer analgesics based on type and severity of pain and evaluate response  - Implement non-pharmacological measures as appropriate and evaluate response  - Consider cultural and social influences on pain and pain management  - Notify physician/advanced practitioner if interventions unsuccessful or patient reports new pain  Outcome: Progressing   Patient has had no problems or complaints of pain throughout the night. Given 3u of Lantus (half dose) and no Humalog ss d/t parameters and patient not having a snack. Callbell and personal items are within reach, bed in low position, no needs or complaints at this time. Fresh towels, washcloths, and gown left in patients room. No needs at this time.

## 2023-10-30 NOTE — PROGRESS NOTES
INTERNAL MEDICINE RESIDENCY PROGRESS NOTE     Name: Lamont Ricks. Age & Sex: 72 y.o. male   MRN: [id number]  Unit/Bed#: [facility] 824-01   Encounter: [id number]  Team: SOD Team C     PATIENT INFORMATION     Name: Lamont Ricks. Age & Sex: 72 y.o. male   MRN: [id number]  Hospital Stay Days: 6    ASSESSMENT/PLAN     Principal Problem:    Cellulitis of left lower extremity  Active Problems:    Sepsis ([address])    Type 2 diabetes mellitus with diabetic neuropathy ([facility])    Atrial fibrillation with RVR ([facility])    Hyperlipidemia    Essential hypertension    Benign prostatic hyperplasia with lower urinary tract symptoms    S/P transmetatarsal amputation of foot, right ([facility])      * Cellulitis of left lower extremity  Assessment & Plan  Patient presented to ED with 1-day of increased erythema, warmth, and pain in LLE in the setting of left foot wound. S/p I&D with Podiatry on 10/26. Markers of infection continue to improve and patient's erythema and pain on exam are markedly better than during previous days. Plan:  - For OR today, 10/30/23 (~1700) per Podiatry for left foot delayed primary closure  -Continue Ancef (day 3)  -Blood cultures negative at 120 hours x2, MRSA negative, acid fast bacilli negative, anaerobic culture negative  -Urine culture positive for 80,000 to 89,000 cfu/mL staph epidermidis   -TD given  -Monitor WBC and fever curves   -Tylenol PRN for fever, pain  -Outpatient follow-up with podiatry recommended on d/c    Sepsis [facility])  Assessment & Plan  On arrival patient met SIRS criteria with T 101.4, . Labs were significant for leukocytosis of 15.08. Lactic acid was normal. Procal negative. CRP and ESR elevated. MRI obtained on 10/25/23 demonstrated no definite presence of osteomyelitis. Now s/p I&D with Podiatry. Patient's signs of infection continue to improve - BC remain negative, wound cultures growing Staph aureus resistant to ampicillin but susceptible to cefazolin.     Plan:  -Blood cultures negative x2, anaerobic wound culture negative, MRSA negative, acid fast bacilli culture negative  -Urine culture demonstrated 80,000-89,000 cfu/mL of staph epidermidis  -Wound culture positive for staph aureus, given resistance profile, continue on Ancef (day 3)  -Trend WBC, fever curves  -Tylenol PRN for fever, pain  -See plan under "cellulitis of left lower extremity"    Type 2 diabetes mellitus with diabetic neuropathy [facility])  Assessment & Plan  Lab Results   Component Value Date    HGBA1C 9.8 (H) 10/25/2023       Recent Labs     10/29/23  1601 10/29/23  2051 10/30/23  0815 10/30/23  1128   POCGLU 311* 147* 249* 226*       Blood Sugar Average: Last 72 hrs:    Patient states he currently uses Lantus 41 units PM, Metformin 500 mg BID - on further review of his chart, this does not appear to be accurate. BG remains uncontrolled on current regimen    Plan:  -Monitor BG  -continue 6 units lantus qHS with 4 units with meals, plus correctional as needed  -Will likely increase tomorrow but hold off for now given NPO status  -Carb-controlled diet         Atrial fibrillation with RVR ([address])  Assessment & Plan  EKG on arrival significant for "Afib RVR, incomplete RBBB, septal infarct (age undetermined). " Home regimen includes: Cardizem 90 mg BID, Eliquis 5 mg BID, Metoprolol 75 mg BID. Tachycardia partially 2/2 sepsis due to cellulitis. Since increasing metoprolol and cardizem doses, rates have now improved to 80s primarily.     Plan:  -Continue home Eliquis 5 mg BID, [id number] score 5  -Continue Metoprolol to 100 mg BID, Cardizem 120 BID; if ongoing issues with rate control, will consider further uptitration of medications; however, expect rates to improve as infection continues to resolve        S/P transmetatarsal amputation of foot, right ([facility])  Assessment & Plan  Stable    Benign prostatic hyperplasia with lower urinary tract symptoms  Assessment & Plan  Plan:  -Continue home Flomax 0.4 mg daily      Essential hypertension  Assessment & Plan  /74 on arrival. Home regimen: Cardizem 90 mg BID, Lisinopril 40 mg daily, Metoprolol XL 75 mg BID. BP relatively well-controlled at present. Plan:  -Continue to monitor BP  -Continue cardizem 120 mg BID  -Continue metoprolol 100 mg BID    Hyperlipidemia  Assessment & Plan  Plan:  -Continue home Lipitor 40 mg daily         Disposition: To OR today for delayed wound closure, ongoing d/c planning and coordination of home health     SUBJECTIVE     Patient seen and examined. No acute events overnight. Patient reports he is comfortable, "feeling good," and is ready to go to the OR. He reports that he has no pain in his left lower extremity and does not feel any pain in the left foot secondary to his diabetic neuropathy. Patient denies any fevers, chills, chest pain, shortness of breath, cough, or difficulty breathing. He also denies any headaches, vision changes, or calf pain. Patient denies any nausea, vomiting, or diarrhea. He reports that he has been passing flatus and that his last bowel movement was yesterday morning. OBJECTIVE     Vitals:    10/29/23 2021 10/29/23 2134 10/29/23 2304 10/30/23 0747   BP: 142/75 144/77 107/65 158/66   Pulse:  74 80 91   Resp:  16  16   Temp:  98.5 °F (36.9 °C)  98.2 °F (36.8 °C)   TempSrc:       SpO2:  96% 98% 95%   Weight:       Height:          Temperature:   Temp (24hrs), Av.4 °F (36.9 °C), Min:98.2 °F (36.8 °C), Max:98.5 °F (36.9 °C)    Temperature: 98.2 °F (36.8 °C)  Intake & Output:  I/O         10/28 0701  10/29 0700 10/29 0701  10/30 0700    P. O. 616 360    Total Intake(mL/kg) 616 360 (3.4)    Urine (mL/kg/hr) 1700 1550 (0.6)    Total Output 1700 1550    Net -1084 -1190                Weights:   IBW (Ideal Body Weight): 86.8 kg    Body mass index is 28.61 kg/m². Weight (last 2 days)       Date/Time Weight    10/29/23 07:16:38 107 (235)          Physical Exam  Constitutional:       General: He is not in acute distress. Appearance: He is not ill-appearing or toxic-appearing. HENT:      Head: Normocephalic and atraumatic. Nose: No congestion or rhinorrhea. Cardiovascular:      Rate and Rhythm: Normal rate. Rhythm irregular. Heart sounds: No murmur heard. No friction rub. No gallop. Pulmonary:      Effort: Pulmonary effort is normal. No accessory muscle usage, respiratory distress or retractions. Breath sounds: Normal breath sounds. No wheezing, rhonchi or rales. Abdominal:      General: There is no distension. Palpations: Abdomen is soft. Tenderness: There is no abdominal tenderness. There is no guarding. Musculoskeletal:      Right lower leg: No edema. Left lower leg: No edema. Feet:    Feet:      Comments: Transmetatarsal amputation of the right foot  Skin:     General: Skin is warm and dry. Coloration: Skin is not jaundiced. Findings: Erythema present. Comments: Minimal erythema of the left lower extremity, markedly improved from prior days   Neurological:      Mental Status: He is alert and oriented to person, place, and time. Psychiatric:         Mood and Affect: Mood normal.         Behavior: Behavior normal. Behavior is cooperative. LABORATORY DATA     Labs: I have personally reviewed pertinent reports.   Results from last 7 days   Lab Units 10/29/23  0459 10/28/23  0524 10/27/23  0532   WBC Thousand/uL 9.38 8.86 11.56*   HEMOGLOBIN g/dL 12.7 11.8* 12.4   HEMATOCRIT % 38.3 36.0* 39.2   PLATELETS Thousands/uL 231 222 201   NEUTROS PCT % 68 70 78*   MONOS PCT % 8 9 9   EOS PCT % 3 2 1      Results from last 7 days   Lab Units 10/29/23  0459 10/28/23  0524 10/27/23  0532 10/26/23  0548 10/25/23  0620 10/24/23  1137   POTASSIUM mmol/L 3.9 4.0 4.3   < > 4.1 4.4   CHLORIDE mmol/L 106 106 106   < > 105 104   CO2 mmol/L 23 23 21   < > 25 24   BUN mg/dL 13 14 17   < > 14 19   CREATININE mg/dL 0.82 0.84 0.94   < > 0.92 0.94   CALCIUM mg/dL 8.4 8.0* 8.1*   < > 9.0 9. 1   ALK PHOS U/L  --   --   --   --  85 89   ALT U/L  --   --   --   --  20 17   AST U/L  --   --   --   --  14 13    < > = values in this interval not displayed. Results from last 7 days   Lab Units 10/24/23  1137   INR  1.37*   PTT seconds 33     Results from last 7 days   Lab Units 10/24/23  1137   LACTIC ACID mmol/L 1.0           IMAGING & DIAGNOSTIC TESTING     Radiology Results: I have personally reviewed pertinent reports. XR foot left 3+ views    Result Date: 10/27/2023  Impression: Possible packing material plantar aspect of fourth and fifth toe. No acute osseous abnormality. Workstation performed: [id number]     MRI foot/forefoot toes left w wo contrast    Result Date: 10/26/2023  Impression: Inhomogeneous fat suppression about the distal lateral foot. Motion degradation. Within the limitations of the examination, no definite abnormal marrow signal to suggest acute osteomyelitis. Findings suggesting cellulitis along the dorsum of the foot without abscess. Additional callus with ulceration along the plantar aspect of the fourth metatarsal head. Workstation performed: [id number]     XR foot 3+ views LEFT    Result Date: 10/25/2023  Impression: No acute osseous abnormality. Workstation performed: [id number]     Other Diagnostic Testing: I have personally reviewed pertinent reports.     ACTIVE MEDICATIONS     Current Facility-Administered Medications   Medication Dose Route Frequency    acetaminophen (TYLENOL) tablet 975 mg  975 mg Oral Q6H PRN    apixaban (ELIQUIS) tablet 5 mg  5 mg Oral BID    atorvastatin (LIPITOR) tablet 40 mg  40 mg Oral Daily    ceFAZolin (ANCEF) IVPB (premix in dextrose) 2,000 mg 50 mL  2,000 mg Intravenous Q8H    diltiazem (CARDIZEM) tablet 120 mg  120 mg Oral BID    insulin glargine (LANTUS) subcutaneous injection 6 Units 0.06 mL  6 Units Subcutaneous HS    insulin lispro (HumaLOG) 100 units/mL subcutaneous injection 1-6 Units  1-6 Units Subcutaneous TID AC    insulin lispro (HumaLOG) 100 units/mL subcutaneous injection 1-6 Units  1-6 Units Subcutaneous HS    insulin lispro (HumaLOG) 100 units/mL subcutaneous injection 4 Units  4 Units Subcutaneous TID With Meals    lisinopril (ZESTRIL) tablet 40 mg  40 mg Oral Daily    metoprolol succinate (TOPROL-XL) 24 hr tablet 100 mg  100 mg Oral BID    oxyCODONE-acetaminophen (PERCOCET) 5-325 mg per tablet 1 tablet  1 tablet Oral Q4H PRN    polyethylene glycol (MIRALAX) packet 17 g  17 g Oral Daily PRN    senna (SENOKOT) tablet 8.6 mg  1 tablet Oral Daily    tamsulosin (FLOMAX) capsule 0.4 mg  0.4 mg Oral Daily With Dinner       VTE Pharmacologic Prophylaxis: Eliquis, morning dose on hold for OR today  VTE Mechanical Prophylaxis: sequential compression device    Portions of the record may have been created with voice recognition software. Occasional wrong word or "sound a like" substitutions may have occurred due to the inherent limitations of voice recognition software. Read the chart carefully and recognize, using context, where substitutions have occurred.   ==  Tej Gonzalez MD  [facility]  Internal Medicine Residency PGY-1

## 2023-10-30 NOTE — ANESTHESIA PREPROCEDURE EVALUATION
Procedure:  CLOSURE DELAYED PRIMARY left foot (Left: Foot)    Relevant Problems   CARDIO   (+) Atrial fibrillation with RVR (HCC)   (+) Essential hypertension   (+) Hyperlipidemia      ENDO   (+) Type 2 diabetes mellitus with diabetic neuropathy (HCC)      /RENAL   (+) Benign prostatic hyperplasia with lower urinary tract symptoms      NEURO/PSYCH   (+) Anxiety   (+) Type 2 diabetes mellitus with diabetic neuropathy (HCC)             Anesthesia Plan  ASA Score- 3     Anesthesia Type- IV sedation with anesthesia with ASA Monitors. Additional Monitors:     Airway Plan:     Comment: IV sedation, GA back up; standard ASA monitors. Risks and benefits discussed with patient; patient consented and agrees to proceed. I saw and evaluated the patient. If seen with CRNA, we have discussed the anesthetic plan and I am in agreement that the plan is appropriate for the patient. .       Plan Factors-    Chart reviewed. Existing labs reviewed. Induction- intravenous. Postoperative Plan-     Informed Consent- Anesthetic plan and risks discussed with patient. I personally reviewed this patient with the CRNA. Discussed and agreed on the Anesthesia Plan with the CRNA. Tom Whitehead

## 2023-10-30 NOTE — CASE MANAGEMENT
Case Management Progress Note    Patient name Kari Carroll Location [facility] 824/[facility] 824-01 MRN [id number]  : 1950 Date 10/30/2023       LOS (days): 6  Geometric Mean LOS (GMLOS) (days): 3.60  Days to GMLOS:-2.5        OBJECTIVE:        Current admission status: Inpatient  Preferred Pharmacy:   [address]  [address] [facility] [address] [facility]  Phone: [phone] Fax: [phone]    [address] [place] [address],  [address] [place] [address]  Phone: [phone] Fax: [phone] [address] [place] [address] [facility] [address]  [address] [facility] [address] [facility]  Phone: [phone] Fax: [address] [place], [address] [facility] [address] [place] [address]  Phone: [phone] Fax: [phone]    Primary Care Provider: Raz Dasilva MD    Primary Insurance: [facility]  Secondary Insurance:     PROGRESS NOTE:    Referral placed to [facility] for home PT/OT and SN.

## 2023-10-30 NOTE — PLAN OF CARE
Problem: PAIN - ADULT  Goal: Verbalizes/displays adequate comfort level or baseline comfort level  Description: Interventions:  - Encourage patient to monitor pain and request assistance  - Assess pain using appropriate pain scale  - Administer analgesics based on type and severity of pain and evaluate response  - Implement non-pharmacological measures as appropriate and evaluate response  - Consider cultural and social influences on pain and pain management  - Notify physician/advanced practitioner if interventions unsuccessful or patient reports new pain  Outcome: Progressing     Problem: INFECTION - ADULT  Goal: Absence or prevention of progression during hospitalization  Description: INTERVENTIONS:  - Assess and monitor for signs and symptoms of infection  - Monitor lab/diagnostic results  - Monitor all insertion sites, i.e. indwelling lines, tubes, and drains  - Monitor endotracheal if appropriate and nasal secretions for changes in amount and color  - [place] appropriate cooling/warming therapies per order  - Administer medications as ordered  - Instruct and encourage patient and family to use good hand hygiene technique  - Identify and instruct in appropriate isolation precautions for identified infection/condition  Outcome: Progressing  Goal: Absence of fever/infection during neutropenic period  Description: INTERVENTIONS:  - Monitor WBC    Outcome: Progressing     Problem: Nutrition/Hydration-ADULT  Goal: Nutrient/Hydration intake appropriate for improving, restoring or maintaining nutritional needs  Description: Monitor and assess patient's nutrition/hydration status for malnutrition. Collaborate with interdisciplinary team and initiate plan and interventions as ordered. Monitor patient's weight and dietary intake as ordered or per policy. Utilize nutrition screening tool and intervene as necessary. Determine patient's food preferences and provide high-protein, high-caloric foods as appropriate. INTERVENTIONS:  - Monitor oral intake, urinary output, labs, and treatment plans  - Assess nutrition and hydration status and recommend course of action  - Evaluate amount of meals eaten  - Assist patient with eating if necessary   - Allow adequate time for meals  - Recommend/ encourage appropriate diets, oral nutritional supplements, and vitamin/mineral supplements  - Order, calculate, and assess calorie counts as needed  - Recommend, monitor, and adjust tube feedings and TPN/PPN based on assessed needs  - Assess need for intravenous fluids  - Provide specific nutrition/hydration education as appropriate  - Include patient/family/caregiver in decisions related to nutrition  Outcome: Progressing

## 2023-10-30 NOTE — QUICK NOTE
Patient's wife updated at bedside during AM rounds. All questions answered regarding care and discharge planning, including coordination of care with home healthcare. In speaking with CM, they are actively in the process of coordinating patient's home healthcare. Will plan to follow-up tomorrow AM and update as needed.

## 2023-10-30 NOTE — PROGRESS NOTES
Podiatry - Progress Note  Patient: Estefania Nuno. 72 y.o. male   MRN: [id number]  PCP: Juvenal Ortiz MD  Unit/Bed#: [facility] 824-01 Encounter: [id number]  Date Of Visit: 10/30/23    ASSESSMENT:    Estefania Nuno. is a 72 y.o. male with:    Left diabetic foot ulceration - plantar lateral forefoot  -s/p left foot I&D with foreign body removal (DOS: 10/26/23)  Cellulitis of left lower extremity  Sepsis - POA  Type 2 DM with diabetic neuropathy  History of R TMA      PLAN:    Patient to go to OR today,10/30/23, for left foot delayed primary closure with Dr. Pollo Cardozo.  Consent signed and uploaded to media. Confirmed NPO status. H&P, vitals, and current labs reviewed. No acute changes noted. Alternatives, risks, and complications discussed with patient. All questions answered. No guarantees given of outcome. Rest of medical care per primary team.       SUBJECTIVE:     The patient was seen, evaluated, and assessed at bedside today. The patient was awake, alert, and in no acute distress. Patient confirmed NPO status. All questions and concerns regarding the surgical procedure addressed. Patient understands risks vs benefits of procedure and remains amenable with plan for surgery today. Patient denies N/V/F/chills/SOB/CP. OBJECTIVE:     Vitals:   /66   Pulse 91   Temp 98.2 °F (36.8 °C)   Resp 16   Ht 6' 4" (1.93 m)   Wt 107 kg (235 lb)   SpO2 95%   BMI 28.61 kg/m²     Temp (24hrs), Av.4 °F (36.9 °C), Min:98.2 °F (36.8 °C), Max:98.5 °F (36.9 °C)      Physical Exam:     General:  Alert, cooperative, and in no distress. Lower extremity exam:  Cardiovascular status at baseline. Neurological status at baseline. Musculoskeletal status at baseline. No calf tenderness noted bilaterally. Dressing left intact to the Operating Room.      Additional Data:     Labs:    Results from last 7 days   Lab Units 10/29/23  0459   WBC Thousand/uL 9.38   HEMOGLOBIN g/dL 12.7   HEMATOCRIT % 38.3   PLATELETS Thousands/uL 231   NEUTROS PCT % 68   LYMPHS PCT % 21   MONOS PCT % 8   EOS PCT % 3     Results from last 7 days   Lab Units 10/29/23  0459 10/26/23  0548 10/25/23  0620   POTASSIUM mmol/L 3.9   < > 4.1   CHLORIDE mmol/L 106   < > 105   CO2 mmol/L 23   < > 25   BUN mg/dL 13   < > 14   CREATININE mg/dL 0.82   < > 0.92   CALCIUM mg/dL 8.4   < > 9.0   ALK PHOS U/L  --   --  85   ALT U/L  --   --  20   AST U/L  --   --  14    < > = values in this interval not displayed. Results from last 7 days   Lab Units 10/24/23  1137   INR  1.37*       * I Have Reviewed All Lab Data Listed Above. Recent Cultures (last 7 days):     Results from last 7 days   Lab Units 10/26/23  2200 10/25/23  1632 10/24/23  1205 10/24/23  1142 10/24/23  1137   BLOOD CULTURE   --   --   --  No Growth After 5 Days. No Growth After 5 Days. GRAM STAIN RESULT  1+ Polys  No bacteria seen No Polys or Bacteria seen  --   --   --    URINE CULTURE   --   --  80,000-89,000 cfu/ml Staphylococcus epidermidis*  --   --    WOUND CULTURE   --  1+ Growth of Staphylococcus aureus*  1+ Growth of  --   --   --      Results from last 7 days   Lab Units 10/26/23  2200 10/25/23  1632   ANAEROBIC CULTURE  No anaerobes isolated No anaerobes isolated       Imaging: I have personally reviewed pertinent films in PACS  EKG, Pathology, and Other Studies: I have personally reviewed pertinent reports. ** Please Note: Portions of the record may have been created with voice recognition software. Occasional wrong word or "sound a like" substitutions may have occurred due to the inherent limitations of voice recognition software. Read the chart carefully and recognize, using context, where substitutions have occurred.  **

## 2023-10-31 PROBLEM — A41.9 SEPSIS (HCC): Status: RESOLVED | Noted: 2021-02-16 | Resolved: 2023-10-31

## 2023-10-31 LAB
ANION GAP SERPL CALCULATED.3IONS-SCNC: 10 MMOL/L
BASOPHILS # BLD AUTO: 0.04 THOUSANDS/ÂΜL (ref 0–0.1)
BASOPHILS NFR BLD AUTO: 0 % (ref 0–1)
BUN SERPL-MCNC: 13 MG/DL (ref 5–25)
CALCIUM SERPL-MCNC: 9 MG/DL (ref 8.4–10.2)
CHLORIDE SERPL-SCNC: 104 MMOL/L (ref 96–108)
CO2 SERPL-SCNC: 26 MMOL/L (ref 21–32)
CREAT SERPL-MCNC: 0.94 MG/DL (ref 0.6–1.3)
EOSINOPHIL # BLD AUTO: 0.27 THOUSAND/ÂΜL (ref 0–0.61)
EOSINOPHIL NFR BLD AUTO: 3 % (ref 0–6)
ERYTHROCYTE [DISTWIDTH] IN BLOOD BY AUTOMATED COUNT: 13.4 % (ref 11.6–15.1)
GFR SERPL CREATININE-BSD FRML MDRD: 81 ML/MIN/1.73SQ M
GLUCOSE SERPL-MCNC: 191 MG/DL (ref 65–140)
GLUCOSE SERPL-MCNC: 227 MG/DL (ref 65–140)
GLUCOSE SERPL-MCNC: 227 MG/DL (ref 65–140)
GLUCOSE SERPL-MCNC: 236 MG/DL (ref 65–140)
GLUCOSE SERPL-MCNC: 296 MG/DL (ref 65–140)
HCT VFR BLD AUTO: 43.9 % (ref 36.5–49.3)
HGB BLD-MCNC: 13.9 G/DL (ref 12–17)
IMM GRANULOCYTES # BLD AUTO: 0.06 THOUSAND/UL (ref 0–0.2)
IMM GRANULOCYTES NFR BLD AUTO: 1 % (ref 0–2)
LYMPHOCYTES # BLD AUTO: 1.77 THOUSANDS/ÂΜL (ref 0.6–4.47)
LYMPHOCYTES NFR BLD AUTO: 17 % (ref 14–44)
MCH RBC QN AUTO: 30.6 PG (ref 26.8–34.3)
MCHC RBC AUTO-ENTMCNC: 31.7 G/DL (ref 31.4–37.4)
MCV RBC AUTO: 97 FL (ref 82–98)
MONOCYTES # BLD AUTO: 0.71 THOUSAND/ÂΜL (ref 0.17–1.22)
MONOCYTES NFR BLD AUTO: 7 % (ref 4–12)
MYCOBACTERIUM SPEC CULT: NORMAL
NEUTROPHILS # BLD AUTO: 7.62 THOUSANDS/ÂΜL (ref 1.85–7.62)
NEUTS SEG NFR BLD AUTO: 72 % (ref 43–75)
NRBC BLD AUTO-RTO: 0 /100 WBCS
PLATELET # BLD AUTO: 337 THOUSANDS/UL (ref 149–390)
PMV BLD AUTO: 10.2 FL (ref 8.9–12.7)
POTASSIUM SERPL-SCNC: 4.4 MMOL/L (ref 3.5–5.3)
RBC # BLD AUTO: 4.54 MILLION/UL (ref 3.88–5.62)
RHODAMINE-AURAMINE STN SPEC: NORMAL
SODIUM SERPL-SCNC: 140 MMOL/L (ref 135–147)
WBC # BLD AUTO: 10.47 THOUSAND/UL (ref 4.31–10.16)

## 2023-10-31 PROCEDURE — 82948 REAGENT STRIP/BLOOD GLUCOSE: CPT

## 2023-10-31 PROCEDURE — 97164 PT RE-EVAL EST PLAN CARE: CPT

## 2023-10-31 PROCEDURE — 85025 COMPLETE CBC W/AUTO DIFF WBC: CPT

## 2023-10-31 PROCEDURE — 97110 THERAPEUTIC EXERCISES: CPT

## 2023-10-31 PROCEDURE — 99024 POSTOP FOLLOW-UP VISIT: CPT | Performed by: PODIATRIST

## 2023-10-31 PROCEDURE — 80048 BASIC METABOLIC PNL TOTAL CA: CPT

## 2023-10-31 PROCEDURE — 99232 SBSQ HOSP IP/OBS MODERATE 35: CPT | Performed by: INTERNAL MEDICINE

## 2023-10-31 RX ORDER — INSULIN GLARGINE 100 [IU]/ML
7 INJECTION, SOLUTION SUBCUTANEOUS
Status: DISCONTINUED | OUTPATIENT
Start: 2023-10-31 | End: 2023-11-01 | Stop reason: HOSPADM

## 2023-10-31 RX ORDER — INSULIN LISPRO 100 [IU]/ML
6 INJECTION, SOLUTION INTRAVENOUS; SUBCUTANEOUS
Status: DISCONTINUED | OUTPATIENT
Start: 2023-10-31 | End: 2023-11-01 | Stop reason: HOSPADM

## 2023-10-31 RX ADMIN — DILTIAZEM HYDROCHLORIDE 120 MG: 60 TABLET, FILM COATED ORAL at 20:31

## 2023-10-31 RX ADMIN — DILTIAZEM HYDROCHLORIDE 120 MG: 60 TABLET, FILM COATED ORAL at 07:21

## 2023-10-31 RX ADMIN — INSULIN LISPRO 4 UNITS: 100 INJECTION, SOLUTION INTRAVENOUS; SUBCUTANEOUS at 07:19

## 2023-10-31 RX ADMIN — APIXABAN 5 MG: 5 TABLET, FILM COATED ORAL at 17:13

## 2023-10-31 RX ADMIN — CEFAZOLIN SODIUM 2000 MG: 2 SOLUTION INTRAVENOUS at 20:38

## 2023-10-31 RX ADMIN — LISINOPRIL 40 MG: 20 TABLET ORAL at 07:21

## 2023-10-31 RX ADMIN — ATORVASTATIN CALCIUM 40 MG: 40 TABLET, FILM COATED ORAL at 07:20

## 2023-10-31 RX ADMIN — TAMSULOSIN HYDROCHLORIDE 0.4 MG: 0.4 CAPSULE ORAL at 17:13

## 2023-10-31 RX ADMIN — INSULIN LISPRO 4 UNITS: 100 INJECTION, SOLUTION INTRAVENOUS; SUBCUTANEOUS at 12:47

## 2023-10-31 RX ADMIN — SENNOSIDES 8.6 MG: 8.6 TABLET, FILM COATED ORAL at 07:20

## 2023-10-31 RX ADMIN — INSULIN LISPRO 6 UNITS: 100 INJECTION, SOLUTION INTRAVENOUS; SUBCUTANEOUS at 17:13

## 2023-10-31 RX ADMIN — INSULIN LISPRO 2 UNITS: 100 INJECTION, SOLUTION INTRAVENOUS; SUBCUTANEOUS at 07:19

## 2023-10-31 RX ADMIN — INSULIN LISPRO 2 UNITS: 100 INJECTION, SOLUTION INTRAVENOUS; SUBCUTANEOUS at 17:13

## 2023-10-31 RX ADMIN — METOPROLOL SUCCINATE 100 MG: 100 TABLET, EXTENDED RELEASE ORAL at 20:31

## 2023-10-31 RX ADMIN — CEFAZOLIN SODIUM 2000 MG: 2 SOLUTION INTRAVENOUS at 11:00

## 2023-10-31 RX ADMIN — METOPROLOL SUCCINATE 100 MG: 100 TABLET, EXTENDED RELEASE ORAL at 07:21

## 2023-10-31 RX ADMIN — INSULIN LISPRO 4 UNITS: 100 INJECTION, SOLUTION INTRAVENOUS; SUBCUTANEOUS at 11:00

## 2023-10-31 RX ADMIN — INSULIN GLARGINE 7 UNITS: 100 INJECTION, SOLUTION SUBCUTANEOUS at 21:19

## 2023-10-31 RX ADMIN — CEFAZOLIN SODIUM 2000 MG: 2 SOLUTION INTRAVENOUS at 03:00

## 2023-10-31 RX ADMIN — INSULIN LISPRO 3 UNITS: 100 INJECTION, SOLUTION INTRAVENOUS; SUBCUTANEOUS at 21:19

## 2023-10-31 NOTE — PROGRESS NOTES
Podiatry - Progress Note  Patient: Estefania Nuno. 72 y.o. male   MRN: [id number]  PCP: Juvenal Ortiz MD  Unit/Bed#: [facility] 824-01 Encounter: [id number]  Date Of Visit: 10/31/23    ASSESSMENT:    Estefania Nuno. is a 72 y.o. male with:    Left diabetic foot ulceration - plantar lateral forefoot  -s/p left foot I&D with foreign body removal (DOS: 10/26/23)  -s/p Left foot DPC (DOS: 10/30/2023)   Cellulitis of left lower extremity  Sepsis - POA  Type 2 DM with diabetic neuropathy  History of R TMA    PLAN:    POD1 Left foot delayed primary closure. Post-surgical dressing located on surgical site and affected extremity were left intact today. Will plan for first complete post-surgical dressing change tomorrow. Pain is well controlled. Continue current pain management regimen. Elevation on green foam wedges or pillows when non-ambulatory  Rest of care per primary team.    Weightbearing status: Non-weightbearing left  foot       SUBJECTIVE:     The patient was seen, evaluated, and assessed at bedside today. The patient was awake, alert, and in no acute distress. The patient reports minimal pain at this time. He states he is trying to stay off his foot. Pain is well controlled with current pain management regimen. Patient reports normal appetite and using the restroom postoperatively. Patient denies N/V/F/chills/SOB/CP. OBJECTIVE:     Vitals:   /89   Pulse 89   Temp 97.8 °F (36.6 °C)   Resp 16   Ht 6' 4" (1.93 m)   Wt 107 kg (235 lb)   SpO2 95%   BMI 28.61 kg/m²     Temp (24hrs), Av.9 °F (36.6 °C), Min:97.4 °F (36.3 °C), Max:98.8 °F (37.1 °C)      Physical Exam:     General:  Alert, cooperative, and in no distress. Lower Extremity:    Left lower extremity:   Post operative dressings remain clean, dry and intact today. No evidence of strike through noted. No ascending erythema noted.      Additional Data:     Labs:    Results from last 7 days   Lab Units 10/31/23  0740   WBC Thousand/uL 10.47* HEMOGLOBIN g/dL 13.9   HEMATOCRIT % 43.9   PLATELETS Thousands/uL 337   NEUTROS PCT % 72   LYMPHS PCT % 17   MONOS PCT % 7   EOS PCT % 3     Results from last 7 days   Lab Units 10/29/23  0459 10/26/23  0548 10/25/23  0620   POTASSIUM mmol/L 3.9   < > 4.1   CHLORIDE mmol/L 106   < > 105   CO2 mmol/L 23   < > 25   BUN mg/dL 13   < > 14   CREATININE mg/dL 0.82   < > 0.92   CALCIUM mg/dL 8.4   < > 9.0   ALK PHOS U/L  --   --  85   ALT U/L  --   --  20   AST U/L  --   --  14    < > = values in this interval not displayed. Results from last 7 days   Lab Units 10/24/23  1137   INR  1.37*       * I Have Reviewed All Lab Data Listed Above. Recent Cultures (last 7 days):     Results from last 7 days   Lab Units 10/26/23  2200 10/25/23  1632 10/24/23  1205 10/24/23  1142 10/24/23  1137   BLOOD CULTURE   --   --   --  No Growth After 5 Days. No Growth After 5 Days. GRAM STAIN RESULT  1+ Polys  No bacteria seen No Polys or Bacteria seen  --   --   --    URINE CULTURE   --   --  80,000-89,000 cfu/ml Staphylococcus epidermidis*  --   --    WOUND CULTURE   --  1+ Growth of Staphylococcus aureus*  1+ Growth of  --   --   --      Results from last 7 days   Lab Units 10/26/23  2200 10/25/23  1632   ANAEROBIC CULTURE  No anaerobes isolated No anaerobes isolated       Imaging: I have personally reviewed pertinent films in PACS  EKG, Pathology, and Other Studies: I have personally reviewed pertinent reports. ** Please Note: Portions of the record may have been created with voice recognition software. Occasional wrong word or "sound a like" substitutions may have occurred due to the inherent limitations of voice recognition software. Read the chart carefully and recognize, using context, where substitutions have occurred.  **

## 2023-10-31 NOTE — PHYSICAL THERAPY NOTE
PHYSICAL THERAPY RE-EVALUATION  NAME:  Jhonatan Oliveros. DATE: 10/31/23    AGE:   72 y.o.   Mrn:   [id number]  ADMIT DX:  Leg pain [M79.606]  Cellulitis of left lower leg [L03.116]  Sepsis ([address]) [A41.9]  Problem List:   Patient Active Problem List   Diagnosis    Hyperlipidemia    Type 2 diabetes mellitus with diabetic neuropathy ([address])    Atrial fibrillation with RVR ([address])    Right middle lobe pulmonary nodule    Essential hypertension    Benign prostatic hyperplasia with lower urinary tract symptoms    Microalbuminuria    Cellulitis of extremity    Aftercare for amputation stump    Ambulatory dysfunction    S/P transmetatarsal amputation of foot, right ([address])    Sleep disturbance    Anxiety    S/P R ALT flap to right foot    COVID-19    Gait abnormality    Vasomotor rhinitis    Corns and callosities    Tinea unguium    Cellulitis of left lower extremity       Past Medical History  Past Medical History:   Diagnosis Date    Arthritis     Atrial fibrillation ([address])     Diagnosed 11/2018    Benign prostate hyperplasia 04/2002    Cellulitis     right lower leg     Colon polyp 2006    Diabetes mellitus ([address])     Hearing aid worn     bilat    Hearing loss     90% loss left ear and 40% right ear    History of clubfoot     "since birth"    History of pneumonia     History of TIA (transient ischemic attack) 04/25/2017 11/30/18 pt denies    Hyperlipidemia 5/15/2014    Hypertension     Infectious viral hepatitis     "cant remember type"    Irregular heart beat     Afib    Neuropathy     both feet    Osteomyelitis ([address])     right great toe    Pneumonia     Right middle lobe pulmonary nodule 11/6/2018    Seasickness     Teeth missing     Type 2 diabetes mellitus with diabetic neuropathy ([address]) 11/6/2018    Wears glasses     reading    Wound, open     bottom of right foot       Past Surgical History  Past Surgical History:   Procedure Laterality Date    BUNIONECTOMY Right 12/4/2018    Procedure: 5TH METATARSAL BONE PARTIAL RESECTION, FULL THICKNESS DEBRIDEMENT OF DIABETIC ULCER;  Surgeon: Jaky Fenton DPM;  [facility];  Service: Podiatry    CLOSURE DELAYED PRIMARY Left 10/30/2023    Procedure: CLOSURE DELAYED PRIMARY left foot;  Surgeon: Christina Gillis DPM;  [facility];  Service: Podiatry    CLUB FOOT RELEASE Bilateral     COLONOSCOPY      COMPLEX WOUND CLOSURE TO EXTREMITY  4/27/2021    Procedure: COMPLEX WOUND CLOSURE TO EXTREMITY: RIGHT FOOT;  Surgeon: Sintia Gonzalez MD;  [facility];  Service: Plastics    EXTERNAL FIXATOR APPLICATION Right 0/74/3168    Procedure: Application multiplane external fixation;  Surgeon: Eyal Bedolla DPM;  [facility];  Service: Podiatry    FOOT HARDWARE REMOVAL Right 2/17/2021    Procedure: REMOVAL EXTERNAL FIXATOR;  Surgeon: Eyal Bedolla DPM;  [facility];  Service: Podiatry    FOREIGN BODY REMOVAL Right 4/27/2021    Procedure: REMOVAL FOREIGN BODY EXTREMITY: RIGHT FOOT;  Surgeon: Sintia Gonzalez MD;  [facility];  Service: Plastics    INCISION AND DRAINAGE OF WOUND Right 2/17/2021    Procedure: INCISION AND DRAINAGE (I&D) EXTREMITY;  Surgeon: Eyal Bedolla DPM;  [facility];  Service: Podiatry    INCISION AND DRAINAGE OF WOUND Left 10/26/2023    Procedure: INCISION AND DRAINAGE (I&D) EXTREMITY; WASHOUT; REMOVAL OF FOREIGN BODY;  Surgeon: Wilver Farmer DPM;  [facility];  Service: Podiatry    ORIF FOOT FRACTURE Right 3/4/2021    Procedure: VAC PLACEMENT; SCREW FIXATION RIGHT FOOT FUSION;  Surgeon: Eyal Bedolla DPM;  [facility];  Service: Podiatry    WV AMPUTATION FOOT TRANSMETARSAL Right 2/22/2021    Procedure: AMPUTATION TRANSMETATARSAL (TMA), REMOVAL NAIL IM T2 ICF HARDWARE;  Surgeon: Eyal Bedolla DPM;  [facility];  Service: Podiatry    WV ARTHRD MIDTARSL/TARS MLT/TRANSVRS W/OSTEOT Right 2/12/2021    Procedure: foot ARTHRODESIS/FUSION;  Surgeon: Eyal Bedolla DPM; [facility];  Service: Podiatry    ID DEBRIDEMENT BONE MUSCLE &/FASCIA 20 SQ CM/< Right 12/2/2021    Procedure: DEBRIDEMENT OF TARSAL BONES WITH ANITBIODIC BEADS;  Surgeon: Eugenio Potter DPM;  [facility];  Service: Podiatry    ID LNGTH/SHRT TENDON LEG/ANKLE 1 TENDON SPX Right 2/12/2021    Procedure: LENGTHEN TIBIAL TENDON, TRANS HEEL CORD;  Surgeon: Eugenio Potter DPM;  [facility];  Service: Podiatry    ID MUSC MYOCUTANEOUS/FASCIOCUTANEOUS FLAP TRUNK Right 4/12/2021    Procedure: RECONSTRUCTION MICROSURGICAL W/ FREE FLAP;  Surgeon: Armond Quick MD;  [facility];  Service: Plastics    ID MUSC MYOCUTANEOUS/FASCIOCUTANEOUS FLAP TRUNK Right 4/12/2021    Procedure: TAKEBACK RECONSTRUCTION MICROSURGICAL W/ FREE FLAP;  Surgeon: Armond Quick MD;  [facility];  Service: Plastics    ID MUSC MYOCUTANEOUS/FASCIOCUTANEOUS FLAP TRUNK Right 4/13/2021    Procedure: RECONSTRUCTION MICROSURGICAL W/ FREE FLAP, RE EXPLORATION, MICRO VASCULAR REVISION;  Surgeon: Armond Quick MD;  [facility];  Service: Plastics    ID OSTC PRTL EXOSTC/CONDYLC METAR HEAD Right 12/29/2020    Procedure: EXCISION EXOSTOSIS;  Surgeon: Eugenio Potter DPM;  [facility];  Service: Podiatry    ID SECONDARY CLOSURE SURG WOUND/DEHSN EXTSV/COMPLIC Right 41/81/7292    Procedure: PRIMARY DELAYED CLOSURE;  Surgeon: Eugenio Potter DPM;  [facility];  Service: Podiatry    TOE AMPUTATION Right     partial great toe    TONSILLECTOMY      VAC DRESSING APPLICATION Right 2/7/4018    Procedure: APPLICATION VAC DRESSING EXTREMITY;  Surgeon: Armond Quick MD;  [facility];  Service: [address] DEBRIDEMENT Right 3/1/2021    Procedure: DEBRIDEMENT LOWER EXTREMITY [facility] OUT);   Surgeon: Armond Quick MD;  [facility];  Service: Plastics    WOUND DEBRIDEMENT Right 4/7/2021    Procedure: Mandie Jamison RIGHT FOOT;  Surgeon: Shorty Salazar MD;  [facility];  Service: Plastics    WOUND DEBRIDEMENT Right 4/12/2021    Procedure: DEBRIDEMENT LOWER EXTREMITY [facility] OUT); Surgeon: Shorty Salazar MD;  [facility];  Service: Plastics    WOUND DEBRIDEMENT Right 4/27/2021    Procedure: DEBRIDEMENT LOWER EXTREMITY [facility] OUT): RIGHT FOOT;  Surgeon: Shorty Salazar MD;  [facility];  Service: Plastics       Length Of Stay: 7  Performed at least 2 patient identifiers during session: Name and Birthday       10/31/23 0916   PT Last Visit   PT Visit Date 10/31/23   Note Type   Note type Re-Evaluation   Pain Assessment   Pain Score No Pain   Restrictions/Precautions   Weight Bearing Precautions Per Order Yes   LLE Weight Bearing Per Order NWB  (clarified WBS with podiatry)   Other Precautions WBS; Limb alert; Fall Risk   Prior Function   Comments no change in home living or PLOF   General   Additional Pertinent History yesturday pt underwent surgery of delayed primary closure; WBS clarified with podiatry that it shoudl be LLE NWB; pt will have dressing change tomorrow   Cognition   Overall Cognitive Status WFL   Arousal/Participation Alert   Attention Within functional limits   Orientation Level Oriented X4   Memory Within functional limits   Following Commands Follows all commands and directions without difficulty   RLE Assessment   RLE Assessment WFL  (prior TMA)   LLE Assessment   LLE Assessment WFL  (ankle NT)   Light Touch   RLE Light Touch Grossly intact   LLE Light Touch Absent   Bed Mobility   Supine to Sit 6  Modified independent   Additional items HOB elevated   Transfers   Sit to Stand   (CGA)   Additional items Verbal cues; Increased time required   Stand to Sit   (CGA)   Additional items Verbal cues; Increased time required   Additional Comments pt reproted dizziness with transitional movement that resolved with rest   Ambulation/Elevation   Gait pattern   (hops on RLE)   Gait Assistance   (CGA)   Additional items Assist x 1;Verbal cues   Assistive Device Rolling walker   Distance 20 ft   Ambulation/Elevation Additional Comments required standing rest break due to fatigue   Balance   Static Sitting Good   Dynamic Sitting Fair +   Static Standing Fair -   Dynamic Standing Poor +   Ambulatory Poor +   Endurance Deficit   Endurance Deficit Yes   Endurance Deficit Description required standing rest break   Activity Tolerance   Activity Tolerance Patient limited by fatigue   Assessment   Prognosis Fair   Assessment Pt is 72 y.o. male seen for PT re-evaluation s/p admit to [address] [place] [address] on 10/24/2023 w/ Cellulitis of left lower extremity. PT consulted to assess pt's functional mobility and d/c needs. Order placed for PT eval and tx, w/ NWB LLE order. Pt agreeable to PT  session upon arrival, pt found supine in bed. 3  Pt to benefit from continued PT tx to address deficits and maximize level of functional independent mobility and consistency. From PT/mobility standpoint, recommendation at time of d/c would be home with home health rehabilitation pending progress in order to facilitate return to [facility]. Upon conclusion pt  seated in recliner. The patient's AM-PAC Basic Mobility Inpatient Short Form Raw Score is 18. A Raw score of greater than 16 suggests the patient may benefit from discharge to home. Please also refer to the recommendation of the Physical Therapist for safe discharge planning. RN verbalized pt appropriate for PT session. Barriers to Discharge None   Goals   Patient Goals to get his wound redressed tomorrow   STG Expiration Date 11/10/23   PT Treatment Day 3   Plan   Treatment/Interventions Functional transfer training;LE strengthening/ROM; Therapeutic exercise; Endurance training;Gait training;Bed mobility; Equipment eval/education   PT Frequency 2-3x/wk   Discharge Recommendation   Rehab Resource Intensity Level, PT II (Moderate Resource Intensity)   Equipment Recommended Wheelchair;Walker   AM-PAC Basic Mobility Inpatient   Turning in Flat Bed Without Bedrails 4   Lying on Back to Sitting on Edge of Flat Bed Without Bedrails 4   Moving Bed to Chair 3   Standing Up From Chair Using Arms 3   Walk in Room 3   Climb 3-5 Stairs With Railing 1   Basic Mobility Inpatient Raw Score 18   Basic Mobility Standardized Score 41.05   Highest Level Of Mobility   JH-HLM Goal 6: Walk 10 steps or more   JH-HLM Achieved 6: Walk 10 steps or more   Additional Treatment Session   Start Time 0908   End Time 0916   Treatment Assessment Therex to increase activity tolerance to improve functional mobiltiy   Exercises   Glute Sets Sitting;15 reps;AROM; Bilateral   Hip Flexion Sitting;15 reps;AROM; Bilateral   Hip Abduction Sitting;15 reps;AROM; Bilateral   Hip Adduction Sitting;15 reps;AROM; Bilateral   Knee AROM Long Arc Quad Sitting;15 reps;AROM; Bilateral       Time In: 8923  Time Out: 0255  Total RE-Evaluation Minutes: [address], PT

## 2023-10-31 NOTE — PROGRESS NOTES
INTERNAL MEDICINE RESIDENCY PROGRESS NOTE     Name: August Castro. Age & Sex: 72 y.o. male   MRN: [id number]  Unit/Bed#: [facility] 824-01   Encounter: [id number]  Team: SOD Team C     PATIENT INFORMATION     Name: August Castro. Age & Sex: 72 y.o. male   MRN: [id number]  Hospital Stay Days: 7    ASSESSMENT/PLAN     Principal Problem:    Cellulitis of left lower extremity  Active Problems:    Type 2 diabetes mellitus with diabetic neuropathy (HCC)    Atrial fibrillation with RVR ([facility])    Hyperlipidemia    Essential hypertension    Benign prostatic hyperplasia with lower urinary tract symptoms    S/P transmetatarsal amputation of foot, right (HCC)      * Cellulitis of left lower extremity  Assessment & Plan  Patient presented to ED with 1-day of increased erythema, warmth, and pain in LLE in the setting of left foot wound. S/p I&D with Podiatry on 10/26 with delayed wound closure on 10/30. Patient continues to remain afebrile without overt signs of infection. Wound cultures growing S. Aureus and mixed skin david - other cultures (AFB, anaerobic, Bcx2) negative. Plan:  -Continue Ancef (day 4)  -Monitor WBC and fever curves   -Tylenol PRN for fever, pain  -Outpatient follow-up with podiatry recommended on d/c    Type 2 diabetes mellitus with diabetic neuropathy [facility])  Assessment & Plan  Lab Results   Component Value Date    HGBA1C 9.8 (H) 10/25/2023       Recent Labs     10/30/23  0815 10/30/23  1128 10/30/23  1600 10/30/23  2016   POCGLU 249* 226* 167* 187*       Blood Sugar Average: Last 72 hrs:    Patient states he currently uses Lantus 41 units PM, Metformin 500 mg BID - on further review of his chart, this does not appear to be accurate. Despite NPO status, patient required 6 additional units of coverage insulin.     Plan:  -Monitor BG  -will plan to increase insulin regimen to 7 units Lantus with 6 units Humalog  -Carb-controlled diet         Atrial fibrillation with RVR ([facility])  Assessment & Plan  EKG on arrival significant for "Afib RVR, incomplete RBBB, septal infarct (age undetermined). " Home regimen includes: Cardizem 90 mg BID, Eliquis 5 mg BID, Metoprolol 75 mg BID. Tachycardia partially 2/2 sepsis due to cellulitis. Rates remain improved following increases in metoprolol/Cardizem. Plan:  -Continue home Eliquis 5 mg BID, [id number] score 5  -Continue Metoprolol to 100 mg BID, Cardizem 120 BID; consider deescalation as needed        S/P transmetatarsal amputation of foot, right (HCC)  Assessment & Plan  Stable    Benign prostatic hyperplasia with lower urinary tract symptoms  Assessment & Plan  Patient initially presented with symptoms of increased urination. Initial urinary workup suggestive of possible infection with UA    Plan:  -Continue home Flomax 0.4 mg daily      Essential hypertension  Assessment & Plan  /72 on arrival. Home regimen: Cardizem 90 mg BID, Lisinopril 40 mg daily, Metoprolol XL 75 mg BID. BP remains relatively well-controlled at present. Plan:  -Continue to monitor BP  -Continue cardizem 120 mg BID  -Continue metoprolol 100 mg BID  -continue lisinopril 40 mg    Hyperlipidemia  Assessment & Plan  Plan:  -Continue home Lipitor 40 mg daily     Sepsis (HCC)-resolved as of 10/31/2023  Assessment & Plan  On arrival patient met SIRS criteria with T 101.4, . Labs were significant for leukocytosis of 15.08. Lactic acid was normal. Procal negative. CRP and ESR elevated. MRI obtained on 10/25/23 demonstrated no definite presence of osteomyelitis. Now s/p I&D with Podiatry. Patient's signs of infection continue to improve - BC remain negative, wound cultures growing Staph aureus resistant to ampicillin but susceptible to cefazolin.     Plan:  -Blood cultures negative x2, anaerobic wound culture negative, MRSA negative, acid fast bacilli culture negative  -Urine culture demonstrated 80,000-89,000 cfu/mL of staph epidermidis  -Wound culture positive for staph aureus, given resistance profile, continue on Ancef (day 3)  -Trend WBC, fever curves  -Tylenol PRN for fever, pain  -See plan under "cellulitis of left lower extremity"        Disposition: Likely d/c in the next 24-48 hours pending clinical course     SUBJECTIVE     Patient seen and examined. No acute events overnight. Patient reports he feels good and is eager to go home. He reports no pain in his left foot as he has no sensation secondary to diabetic neuropathy. Patient denies any chest pain, shortness of breath, palpitations, difficulty breathing, or calf pain. He denies any fever, nausea, vomiting, or diarrhea. He denies any headaches, vision changes, weakness, or dizziness. He reports he is passing flatus and is urinating without difficulty. OBJECTIVE     Vitals:    10/30/23 2136 10/30/23 2151 10/30/23 2244 10/31/23 0721   BP: 146/85 148/84 136/87 141/89   Pulse: 97 78 72 89   Resp:  14 16    Temp: 98.2 °F (36.8 °C) 98.2 °F (36.8 °C) 97.8 °F (36.6 °C)    TempSrc:       SpO2: 94% 90% 91% 95%   Weight:       Height:          Temperature:   Temp (24hrs), Av.9 °F (36.6 °C), Min:97.4 °F (36.3 °C), Max:98.8 °F (37.1 °C)    Temperature: 97.8 °F (36.6 °C)  Intake & Output:  I/O         10/29 0701  10/30 0700 10/30 0701  10/31 0700    P. O. 360     Total Intake(mL/kg) 360 (3.4)     Urine (mL/kg/hr) 1550 (0.6) 800 (0.3)    Total Output 1550 800    Net -1190 -800                Weights:   IBW (Ideal Body Weight): 86.8 kg    Body mass index is 28.61 kg/m². Weight (last 2 days)       Date/Time Weight    10/29/23 07:16:38 107 (235)          Physical Exam  Constitutional:       General: He is not in acute distress. Appearance: Normal appearance. He is not ill-appearing or toxic-appearing. HENT:      Head: Normocephalic and atraumatic.       Right Ear: External ear normal.      Left Ear: External ear normal.      Nose: Nose normal.      Mouth/Throat:      Mouth: Mucous membranes are moist.   Eyes:      Extraocular Movements: Extraocular movements intact. Conjunctiva/sclera: Conjunctivae normal.      Pupils: Pupils are equal, round, and reactive to light. Cardiovascular:      Rate and Rhythm: Normal rate. Rhythm irregular. Pulses: Normal pulses. Heart sounds: Normal heart sounds. No murmur heard. No friction rub. No gallop. Pulmonary:      Effort: Pulmonary effort is normal. No respiratory distress. Breath sounds: Normal breath sounds. No wheezing, rhonchi or rales. Abdominal:      General: Abdomen is flat. Bowel sounds are normal. There is no distension. Palpations: Abdomen is soft. Tenderness: There is no abdominal tenderness. There is no guarding. Musculoskeletal:      Cervical back: Normal range of motion. Right lower leg: No edema. Left lower leg: No edema. Feet:    Feet:      Comments: Transmetatarsal amputation of the right foot. Skin:     General: Skin is warm and dry. Capillary Refill: Capillary refill takes less than 2 seconds. Findings: No erythema. Comments: Venous stasis changes LLE; surgical site dressed and appears clean and intact   Neurological:      Mental Status: He is alert and oriented to person, place, and time. Psychiatric:         Behavior: Behavior is cooperative. LABORATORY DATA     Labs: I have personally reviewed pertinent reports.   Results from last 7 days   Lab Units 10/31/23  0740 10/29/23  0459 10/28/23  0524   WBC Thousand/uL 10.47* 9.38 8.86   HEMOGLOBIN g/dL 13.9 12.7 11.8*   HEMATOCRIT % 43.9 38.3 36.0*   PLATELETS Thousands/uL 337 231 222   NEUTROS PCT % 72 68 70   MONOS PCT % 7 8 9   EOS PCT % 3 3 2      Results from last 7 days   Lab Units 10/31/23  0740 10/29/23  0459 10/28/23  0524 10/26/23  0548 10/25/23  0620 10/24/23  1137   POTASSIUM mmol/L 4.4 3.9 4.0   < > 4.1 4.4   CHLORIDE mmol/L 104 106 106   < > 105 104   CO2 mmol/L 26 23 23   < > 25 24   BUN mg/dL 13 13 14   < > 14 19   CREATININE mg/dL 0.94 0.82 0.84   < > 0.92 0.94 CALCIUM mg/dL 9.0 8.4 8.0*   < > 9.0 9.1   ALK PHOS U/L  --   --   --   --  85 89   ALT U/L  --   --   --   --  20 17   AST U/L  --   --   --   --  14 13    < > = values in this interval not displayed. Results from last 7 days   Lab Units 10/24/23  1137   INR  1.37*   PTT seconds 33     Results from last 7 days   Lab Units 10/24/23  1137   LACTIC ACID mmol/L 1.0           IMAGING & DIAGNOSTIC TESTING     Radiology Results: I have personally reviewed pertinent reports. XR foot left 3+ views    Result Date: 10/27/2023  Impression: Possible packing material plantar aspect of fourth and fifth toe. No acute osseous abnormality. Workstation performed: [id number]     MRI foot/forefoot toes left w wo contrast    Result Date: 10/26/2023  Impression: Inhomogeneous fat suppression about the distal lateral foot. Motion degradation. Within the limitations of the examination, no definite abnormal marrow signal to suggest acute osteomyelitis. Findings suggesting cellulitis along the dorsum of the foot without abscess. Additional callus with ulceration along the plantar aspect of the fourth metatarsal head. Workstation performed: [id number]     XR foot 3+ views LEFT    Result Date: 10/25/2023  Impression: No acute osseous abnormality. Workstation performed: [id number]     Other Diagnostic Testing: I have personally reviewed pertinent reports.     ACTIVE MEDICATIONS     Current Facility-Administered Medications   Medication Dose Route Frequency    acetaminophen (TYLENOL) tablet 975 mg  975 mg Oral Q6H PRN    atorvastatin (LIPITOR) tablet 40 mg  40 mg Oral Daily    ceFAZolin (ANCEF) IVPB (premix in dextrose) 2,000 mg 50 mL  2,000 mg Intravenous Q8H    diltiazem (CARDIZEM) tablet 120 mg  120 mg Oral BID    insulin glargine (LANTUS) subcutaneous injection 6 Units 0.06 mL  6 Units Subcutaneous HS    insulin lispro (HumaLOG) 100 units/mL subcutaneous injection 1-6 Units  1-6 Units Subcutaneous TID AC    insulin lispro (HumaLOG) 100 units/mL subcutaneous injection 1-6 Units  1-6 Units Subcutaneous HS    insulin lispro (HumaLOG) 100 units/mL subcutaneous injection 4 Units  4 Units Subcutaneous TID With Meals    lisinopril (ZESTRIL) tablet 40 mg  40 mg Oral Daily    metoprolol succinate (TOPROL-XL) 24 hr tablet 100 mg  100 mg Oral BID    oxyCODONE-acetaminophen (PERCOCET) 5-325 mg per tablet 1 tablet  1 tablet Oral Q4H PRN    polyethylene glycol (MIRALAX) packet 17 g  17 g Oral Daily PRN    senna (SENOKOT) tablet 8.6 mg  1 tablet Oral Daily    tamsulosin (FLOMAX) capsule 0.4 mg  0.4 mg Oral Daily With Dinner       VTE Pharmacologic Prophylaxis: Eliquis  VTE Mechanical Prophylaxis: sequential compression device    Portions of the record may have been created with voice recognition software. Occasional wrong word or "sound a like" substitutions may have occurred due to the inherent limitations of voice recognition software. Read the chart carefully and recognize, using context, where substitutions have occurred.   ==  Maryann Anderson MD  [address] [facility]  Internal Medicine Residency PGY-1

## 2023-10-31 NOTE — PLAN OF CARE
Problem: PHYSICAL THERAPY ADULT  Goal: Performs mobility at highest level of function for planned discharge setting. See evaluation for individualized goals. Description: Treatment/Interventions: OT, Spoke to nursing, Gait training  Equipment Recommended: Danisha Tineo       See flowsheet documentation for full assessment, interventions and recommendations. Outcome: Progressing  Note: Prognosis: Fair  Problem List: Decreased endurance, Decreased mobility, Pain, Decreased skin integrity  Assessment: Pt is 72 y.o. male seen for PT re-evaluation s/p admit to [address] [place] [address] on 10/24/2023 w/ Cellulitis of left lower extremity. PT consulted to assess pt's functional mobility and d/c needs. Order placed for PT eval and tx, w/ NWB LLE order. Pt agreeable to PT  session upon arrival, pt found supine in bed. 3  Pt to benefit from continued PT tx to address deficits and maximize level of functional independent mobility and consistency. From PT/mobility standpoint, recommendation at time of d/c would be home with home health rehabilitation pending progress in order to facilitate return to [facility]. Upon conclusion pt  seated in recliner. The patient's AM-PAC Basic Mobility Inpatient Short Form Raw Score is 18. A Raw score of greater than 16 suggests the patient may benefit from discharge to home. Please also refer to the recommendation of the Physical Therapist for safe discharge planning. RN verbalized pt appropriate for PT session. Barriers to Discharge: None     Rehab Resource Intensity Level, PT: II (Moderate Resource Intensity)    See flowsheet documentation for full assessment.

## 2023-10-31 NOTE — ANESTHESIA POSTPROCEDURE EVALUATION
Post-Op Assessment Note    CV Status:  Stable  Pain Score: 0    Pain management: adequate     Mental Status:  Alert and awake   Hydration Status:  Euvolemic   PONV Controlled:  Controlled   Airway Patency:  Patent      Post Op Vitals Reviewed: Yes      Staff: CRNA         No notable events documented.     BP   128/74   Temp   97.8   Pulse  105   Resp   18   SpO2   97

## 2023-11-01 ENCOUNTER — TRANSITIONAL CARE MANAGEMENT (OUTPATIENT)
Dept: FAMILY MEDICINE CLINIC | Facility: CLINIC | Age: 73
End: 2023-11-01

## 2023-11-01 ENCOUNTER — VBI (OUTPATIENT)
Dept: ADMINISTRATIVE | Facility: OTHER | Age: 73
End: 2023-11-01

## 2023-11-01 ENCOUNTER — HOME HEALTH ADMISSION (OUTPATIENT)
Dept: HOME HEALTH SERVICES | Facility: HOME HEALTHCARE | Age: 73
End: 2023-11-01
Payer: COMMERCIAL

## 2023-11-01 VITALS
OXYGEN SATURATION: 96 % | TEMPERATURE: 98.6 F | SYSTOLIC BLOOD PRESSURE: 158 MMHG | RESPIRATION RATE: 16 BRPM | DIASTOLIC BLOOD PRESSURE: 89 MMHG | BODY MASS INDEX: 28.62 KG/M2 | WEIGHT: 235 LBS | HEIGHT: 76 IN | HEART RATE: 91 BPM

## 2023-11-01 LAB
ANION GAP SERPL CALCULATED.3IONS-SCNC: 8 MMOL/L
BASOPHILS # BLD AUTO: 0.03 THOUSANDS/ÂΜL (ref 0–0.1)
BASOPHILS NFR BLD AUTO: 0 % (ref 0–1)
BUN SERPL-MCNC: 14 MG/DL (ref 5–25)
CALCIUM SERPL-MCNC: 8.8 MG/DL (ref 8.4–10.2)
CHLORIDE SERPL-SCNC: 104 MMOL/L (ref 96–108)
CO2 SERPL-SCNC: 23 MMOL/L (ref 21–32)
CREAT SERPL-MCNC: 0.85 MG/DL (ref 0.6–1.3)
DME PARACHUTE DELIVERY DATE ACTUAL: NORMAL
DME PARACHUTE DELIVERY DATE REQUESTED: NORMAL
DME PARACHUTE ITEM DESCRIPTION: NORMAL
DME PARACHUTE ORDER STATUS: NORMAL
DME PARACHUTE SUPPLIER NAME: NORMAL
DME PARACHUTE SUPPLIER PHONE: NORMAL
EOSINOPHIL # BLD AUTO: 0.34 THOUSAND/ÂΜL (ref 0–0.61)
EOSINOPHIL NFR BLD AUTO: 4 % (ref 0–6)
ERYTHROCYTE [DISTWIDTH] IN BLOOD BY AUTOMATED COUNT: 13.4 % (ref 11.6–15.1)
GFR SERPL CREATININE-BSD FRML MDRD: 87 ML/MIN/1.73SQ M
GLUCOSE SERPL-MCNC: 200 MG/DL (ref 65–140)
GLUCOSE SERPL-MCNC: 208 MG/DL (ref 65–140)
GLUCOSE SERPL-MCNC: 283 MG/DL (ref 65–140)
HCT VFR BLD AUTO: 42.7 % (ref 36.5–49.3)
HGB BLD-MCNC: 13.5 G/DL (ref 12–17)
IMM GRANULOCYTES # BLD AUTO: 0.07 THOUSAND/UL (ref 0–0.2)
IMM GRANULOCYTES NFR BLD AUTO: 1 % (ref 0–2)
LYMPHOCYTES # BLD AUTO: 1.99 THOUSANDS/ÂΜL (ref 0.6–4.47)
LYMPHOCYTES NFR BLD AUTO: 21 % (ref 14–44)
MCH RBC QN AUTO: 30.6 PG (ref 26.8–34.3)
MCHC RBC AUTO-ENTMCNC: 31.6 G/DL (ref 31.4–37.4)
MCV RBC AUTO: 97 FL (ref 82–98)
MONOCYTES # BLD AUTO: 0.76 THOUSAND/ÂΜL (ref 0.17–1.22)
MONOCYTES NFR BLD AUTO: 8 % (ref 4–12)
NEUTROPHILS # BLD AUTO: 6.46 THOUSANDS/ÂΜL (ref 1.85–7.62)
NEUTS SEG NFR BLD AUTO: 66 % (ref 43–75)
NRBC BLD AUTO-RTO: 0 /100 WBCS
PLATELET # BLD AUTO: 320 THOUSANDS/UL (ref 149–390)
PMV BLD AUTO: 10.3 FL (ref 8.9–12.7)
POTASSIUM SERPL-SCNC: 4.3 MMOL/L (ref 3.5–5.3)
RBC # BLD AUTO: 4.41 MILLION/UL (ref 3.88–5.62)
SODIUM SERPL-SCNC: 135 MMOL/L (ref 135–147)
WBC # BLD AUTO: 9.65 THOUSAND/UL (ref 4.31–10.16)

## 2023-11-01 PROCEDURE — 85025 COMPLETE CBC W/AUTO DIFF WBC: CPT

## 2023-11-01 PROCEDURE — 80048 BASIC METABOLIC PNL TOTAL CA: CPT

## 2023-11-01 PROCEDURE — 99024 POSTOP FOLLOW-UP VISIT: CPT | Performed by: PODIATRIST

## 2023-11-01 PROCEDURE — 99238 HOSP IP/OBS DSCHRG MGMT 30/<: CPT | Performed by: INTERNAL MEDICINE

## 2023-11-01 PROCEDURE — 82948 REAGENT STRIP/BLOOD GLUCOSE: CPT

## 2023-11-01 RX ORDER — INSULIN GLARGINE 100 [IU]/ML
INJECTION, SOLUTION SUBCUTANEOUS
Qty: 45 ML | Refills: 6 | Status: SHIPPED | OUTPATIENT
Start: 2023-11-01

## 2023-11-01 RX ORDER — INSULIN LISPRO 100 [IU]/ML
6 INJECTION, SOLUTION INTRAVENOUS; SUBCUTANEOUS
Qty: 5.4 ML | Refills: 0 | Status: SHIPPED | OUTPATIENT
Start: 2023-11-01 | End: 2023-12-01

## 2023-11-01 RX ORDER — METOPROLOL SUCCINATE 100 MG/1
100 TABLET, EXTENDED RELEASE ORAL 2 TIMES DAILY
Qty: 180 TABLET | Refills: 0 | Status: SHIPPED | OUTPATIENT
Start: 2023-11-01 | End: 2024-01-30

## 2023-11-01 RX ORDER — DILTIAZEM HYDROCHLORIDE 120 MG/1
120 TABLET, FILM COATED ORAL 2 TIMES DAILY
Qty: 60 TABLET | Refills: 1 | OUTPATIENT
Start: 2023-11-01

## 2023-11-01 RX ORDER — INSULIN LISPRO 100 [IU]/ML
6 INJECTION, SOLUTION INTRAVENOUS; SUBCUTANEOUS
Qty: 15 ML | Refills: 0 | OUTPATIENT
Start: 2023-11-01

## 2023-11-01 RX ORDER — INSULIN GLARGINE 100 [IU]/ML
6 INJECTION, SOLUTION SUBCUTANEOUS
Qty: 3 ML | Refills: 0 | OUTPATIENT
Start: 2023-11-01

## 2023-11-01 RX ORDER — METOPROLOL SUCCINATE 100 MG/1
100 TABLET, EXTENDED RELEASE ORAL 2 TIMES DAILY
Qty: 60 TABLET | Refills: 1 | OUTPATIENT
Start: 2023-11-01

## 2023-11-01 RX ORDER — DILTIAZEM HYDROCHLORIDE 120 MG/1
120 TABLET, FILM COATED ORAL 2 TIMES DAILY
Qty: 180 TABLET | Refills: 0 | Status: SHIPPED | OUTPATIENT
Start: 2023-11-01 | End: 2024-01-30

## 2023-11-01 RX ORDER — INSULIN GLARGINE 100 [IU]/ML
7 INJECTION, SOLUTION SUBCUTANEOUS
Qty: 6.3 ML | Refills: 0 | Status: SHIPPED | OUTPATIENT
Start: 2023-11-01 | End: 2023-11-01

## 2023-11-01 RX ADMIN — APIXABAN 5 MG: 5 TABLET, FILM COATED ORAL at 08:14

## 2023-11-01 RX ADMIN — CEFAZOLIN SODIUM 2000 MG: 2 SOLUTION INTRAVENOUS at 11:02

## 2023-11-01 RX ADMIN — INSULIN LISPRO 2 UNITS: 100 INJECTION, SOLUTION INTRAVENOUS; SUBCUTANEOUS at 08:09

## 2023-11-01 RX ADMIN — DILTIAZEM HYDROCHLORIDE 120 MG: 60 TABLET, FILM COATED ORAL at 08:15

## 2023-11-01 RX ADMIN — ATORVASTATIN CALCIUM 40 MG: 40 TABLET, FILM COATED ORAL at 08:15

## 2023-11-01 RX ADMIN — OXYCODONE HYDROCHLORIDE AND ACETAMINOPHEN 1 TABLET: 5; 325 TABLET ORAL at 14:11

## 2023-11-01 RX ADMIN — INSULIN LISPRO 6 UNITS: 100 INJECTION, SOLUTION INTRAVENOUS; SUBCUTANEOUS at 08:10

## 2023-11-01 RX ADMIN — METOPROLOL SUCCINATE 100 MG: 100 TABLET, EXTENDED RELEASE ORAL at 08:16

## 2023-11-01 RX ADMIN — INSULIN LISPRO 4 UNITS: 100 INJECTION, SOLUTION INTRAVENOUS; SUBCUTANEOUS at 10:52

## 2023-11-01 RX ADMIN — LISINOPRIL 40 MG: 20 TABLET ORAL at 08:16

## 2023-11-01 NOTE — PLAN OF CARE
Problem: PAIN - ADULT  Goal: Verbalizes/displays adequate comfort level or baseline comfort level  Description: Interventions:  - Encourage patient to monitor pain and request assistance  - Assess pain using appropriate pain scale  - Administer analgesics based on type and severity of pain and evaluate response  - Implement non-pharmacological measures as appropriate and evaluate response  - Consider cultural and social influences on pain and pain management  - Notify physician/advanced practitioner if interventions unsuccessful or patient reports new pain  Outcome: Progressing     Problem: INFECTION - ADULT  Goal: Absence or prevention of progression during hospitalization  Description: INTERVENTIONS:  - Assess and monitor for signs and symptoms of infection  - Monitor lab/diagnostic results  - Monitor all insertion sites, i.e. indwelling lines, tubes, and drains  - Monitor endotracheal if appropriate and nasal secretions for changes in amount and color  - [place] appropriate cooling/warming therapies per order  - Administer medications as ordered  - Instruct and encourage patient and family to use good hand hygiene technique  - Identify and instruct in appropriate isolation precautions for identified infection/condition  Outcome: Progressing  Goal: Absence of fever/infection during neutropenic period  Description: INTERVENTIONS:  - Monitor WBC    Outcome: Progressing     Problem: SAFETY ADULT  Goal: Patient will remain free of falls  Description: INTERVENTIONS:  - Educate patient/family on patient safety including physical limitations  - Instruct patient to call for assistance with activity   - Consult OT/PT to assist with strengthening/mobility   - Keep Call bell within reach  - Keep bed low and locked with side rails adjusted as appropriate  - Keep care items and personal belongings within reach  - Initiate and maintain comfort rounds  - Make Fall Risk Sign visible to staff  - Offer Toileting every 2 Hours, in advance of need  - Apply yellow socks and bracelet for high fall risk patients  - Consider moving patient to room near nurses station  Outcome: Progressing  Goal: Maintain or return to baseline ADL function  Description: INTERVENTIONS:  -  Assess patient's ability to carry out ADLs; assess patient's baseline for ADL function and identify physical deficits which impact ability to perform ADLs (bathing, care of mouth/teeth, toileting, grooming, dressing, etc.)  - Assess/evaluate cause of self-care deficits   - Assess range of motion  - Assess patient's mobility; develop plan if impaired  - Assess patient's need for assistive devices and provide as appropriate  - Encourage maximum independence but intervene and supervise when necessary  - Involve family in performance of ADLs  - Assess for home care needs following discharge   - Consider OT consult to assist with ADL evaluation and planning for discharge  - Provide patient education as appropriate  Outcome: Progressing  Goal: Maintains/Returns to pre admission functional level  Description: INTERVENTIONS:  - Perform BMAT or MOVE assessment daily.   - Set and communicate daily mobility goal to care team and patient/family/caregiver. - Collaborate with rehabilitation services on mobility goals if consulted  - Perform Range of Motion 3 times a day. - Reposition patient every 2 hours.   - Dangle patient 3 times a day  - Stand patient 3 times a day  - Ambulate patient 3 times a day  - Out of bed to chair 3 times a day   - Out of bed for meals 3 times a day  - Out of bed for toileting  - Record patient progress and toleration of activity level   Outcome: Progressing     Problem: DISCHARGE PLANNING  Goal: Discharge to home or other facility with appropriate resources  Description: INTERVENTIONS:  - Identify barriers to discharge w/patient and caregiver  - Arrange for needed discharge resources and transportation as appropriate  - Identify discharge learning needs (meds, wound care, etc.)  - Arrange for interpretive services to assist at discharge as needed  - Refer to Case Management Department for coordinating discharge planning if the patient needs post-hospital services based on physician/advanced practitioner order or complex needs related to functional status, cognitive ability, or social support system  Outcome: Progressing     Problem: Knowledge Deficit  Goal: Patient/family/caregiver demonstrates understanding of disease process, treatment plan, medications, and discharge instructions  Description: Complete learning assessment and assess knowledge base. Interventions:  - Provide teaching at level of understanding  - Provide teaching via preferred learning methods  Outcome: Progressing     Problem: Prexisting or High Potential for Compromised Skin Integrity  Goal: Skin integrity is maintained or improved  Description: INTERVENTIONS:  - Identify patients at risk for skin breakdown  - Assess and monitor skin integrity  - Assess and monitor nutrition and hydration status  - Monitor labs   - Assess for incontinence   - Turn and reposition patient  - Assist with mobility/ambulation  - Relieve pressure over bony prominences  - Avoid friction and shearing  - Provide appropriate hygiene as needed including keeping skin clean and dry  - Evaluate need for skin moisturizer/barrier cream  - Collaborate with interdisciplinary team   - Patient/family teaching  - Consider wound care consult   Outcome: Progressing     Problem: Nutrition/Hydration-ADULT  Goal: Nutrient/Hydration intake appropriate for improving, restoring or maintaining nutritional needs  Description: Monitor and assess patient's nutrition/hydration status for malnutrition. Collaborate with interdisciplinary team and initiate plan and interventions as ordered. Monitor patient's weight and dietary intake as ordered or per policy. Utilize nutrition screening tool and intervene as necessary.  Determine patient's food preferences and provide high-protein, high-caloric foods as appropriate. INTERVENTIONS:  - Monitor oral intake, urinary output, labs, and treatment plans  - Assess nutrition and hydration status and recommend course of action  - Evaluate amount of meals eaten  - Assist patient with eating if necessary   - Allow adequate time for meals  - Recommend/ encourage appropriate diets, oral nutritional supplements, and vitamin/mineral supplements  - Order, calculate, and assess calorie counts as needed  - Recommend, monitor, and adjust tube feedings and TPN/PPN based on assessed needs  - Assess need for intravenous fluids  - Provide specific nutrition/hydration education as appropriate  - Include patient/family/caregiver in decisions related to nutrition  Outcome: Progressing     Problem: MOBILITY - ADULT  Goal: Maintain or return to baseline ADL function  Description: INTERVENTIONS:  -  Assess patient's ability to carry out ADLs; assess patient's baseline for ADL function and identify physical deficits which impact ability to perform ADLs (bathing, care of mouth/teeth, toileting, grooming, dressing, etc.)  - Assess/evaluate cause of self-care deficits   - Assess range of motion  - Assess patient's mobility; develop plan if impaired  - Assess patient's need for assistive devices and provide as appropriate  - Encourage maximum independence but intervene and supervise when necessary  - Involve family in performance of ADLs  - Assess for home care needs following discharge   - Consider OT consult to assist with ADL evaluation and planning for discharge  - Provide patient education as appropriate  Outcome: Progressing  Goal: Maintains/Returns to pre admission functional level  Description: INTERVENTIONS:  - Perform BMAT or MOVE assessment daily.   - Set and communicate daily mobility goal to care team and patient/family/caregiver.    - Collaborate with rehabilitation services on mobility goals if consulted  - Perform Range of Motion 3 times a day. - Reposition patient every 2 hours.   - Dangle patient 3 times a day  - Stand patient 3 times a day  - Ambulate patient 3 times a day  - Out of bed to chair 3 times a day   - Out of bed for meals 3 times a day  - Out of bed for toileting  - Record patient progress and toleration of activity level   Outcome: Progressing

## 2023-11-01 NOTE — PROGRESS NOTES
[facility] Podiatry - Progress Note  Patient: Valentina Herman. 72 y.o. male   MRN: [id number]  PCP: Judy Castillo MD  [facility] Encounter: [id number]  Date Of Visit: 11/01/23    ASSESSMENT:    Valentina Herman. is a 72 y.o. male with:    Left diabetic foot ulceration - plantar lateral forefoot  -s/p left foot I&D with foreign body removal (DOS: 10/26/23)  -s/p Left foot DPC (DOS: 10/30/2023)   Cellulitis of left lower extremity  Sepsis - POA  Type 2 DM with diabetic neuropathy  History of R TMA    PLAN:    POD2: Left foot delayed primary closure. Surgical site is stable, no acute clinical signs of infection. Continue local wound care of betadine, adaptic, DSD to incision site. Appreciate nursing assistance with dressing changes. Patient is stable for discharge from a podiatry standpoint. Will follow up outpatient for post operative care. Elevation on green foam wedges or pillows when non-ambulatory. Appreciate PT/OT recommendations, patient to ambulate with the assistance of a walker or with the use of wheelchair to maintain nonweightbearing status to left foot. Complete ABX course per primary team.  Advised patient that noncompliance to nonweightbearing on his left foot may lead to increased risk of wound dehiscence, post operative infection, need for further procedures, and delayed wound healing. Rest of care per primary team.    Weight bearing status: Nonweightbearing to left foot    SUBJECTIVE:     The patient was seen, evaluated, and assessed at bedside today. The patient was awake, alert, and in no acute distress. No acute events overnight. The patient reports no pain in his left foot at this time, and he states that he is working with PT to assist with his adjustment to nonweightbearing on his left foot. Patient denies N/V/F/chills/SOB/CP.     OBJECTIVE:     Vitals:   /89   Pulse 91   Temp 98.6 °F (37 °C)   Resp 16   Ht 6' 4" (1.93 m)   Wt 107 kg (235 lb)   SpO2 96%   BMI 28.61 kg/m²     Temp (24hrs), Av.4 °F (36.9 °C), Min:98.1 °F (36.7 °C), Max:98.7 °F (37.1 °C)      Physical Exam:     General:  Alert, cooperative, and in no distress. Lower extremity exam:  Cardiovascular status at baseline. Neurological status at baseline. S/p left foot I&D. No calf tenderness noted. Left lower extremity: Incision site is stable, sutures intact with skin edges well aligned,  no acute clinical signs of infection: no purulence, no malodor, no ascending erythema, no crepitus, no fluctuance. There is mild maceration to the fifth webspace, no areas of dehiscence. There is mild michael-incisional erythema consistent with the post operative state, significant improvement of the dorsal foot erythema post operatively    Clinical Images 23: Additional Data:     Labs:    Results from last 7 days   Lab Units 23  0622   WBC Thousand/uL 9.65   HEMOGLOBIN g/dL 13.5   HEMATOCRIT % 42.7   PLATELETS Thousands/uL 320   NEUTROS PCT % 66   LYMPHS PCT % 21   MONOS PCT % 8   EOS PCT % 4     Results from last 7 days   Lab Units 23  0622   POTASSIUM mmol/L 4.3   CHLORIDE mmol/L 104   CO2 mmol/L 23   BUN mg/dL 14   CREATININE mg/dL 0.85   CALCIUM mg/dL 8.8           * I Have Reviewed All Lab Data Listed Above. Recent Cultures (last 7 days):     Results from last 7 days   Lab Units 10/26/23  2200 10/25/23  1632   GRAM STAIN RESULT  1+ Polys  No bacteria seen No Polys or Bacteria seen   WOUND CULTURE   --  1+ Growth of Staphylococcus aureus*  1+ Growth of     Results from last 7 days   Lab Units 10/26/23  2200 10/25/23  1632   ANAEROBIC CULTURE  No anaerobes isolated No anaerobes isolated       Imaging: I have personally reviewed pertinent films in PACS  EKG, Pathology, and Other Studies: I have personally reviewed pertinent reports. ** Please Note: Portions of the record may have been created with voice recognition software.  Occasional wrong word or "sound a like" substitutions may have occurred due to the inherent limitations of voice recognition software. Read the chart carefully and recognize, using context, where substitutions have occurred.  **    '

## 2023-11-01 NOTE — CASE MANAGEMENT
Case Management Discharge Planning Note    Patient name Bryanna Yap.   [facility] MRN [id number]  : 1950 Date 2023       Current Admission Date: 10/24/2023  Current Admission Diagnosis:Cellulitis of left lower extremity   Patient Active Problem List    Diagnosis Date Noted    Cellulitis of left lower extremity 10/24/2023    Corns and callosities 2023    Tinea unguium 2023    Vasomotor rhinitis 2022    Gait abnormality 10/25/2022    COVID-19 10/18/2021    S/P R ALT flap to right foot 2021    Anxiety 2021    Sleep disturbance 2021    S/P transmetatarsal amputation of foot, right ([address]) 2021    Aftercare for amputation stump 03/10/2021    Ambulatory dysfunction 03/10/2021    Cellulitis of extremity 2021    Microalbuminuria 2020    Essential hypertension 2019    Benign prostatic hyperplasia with lower urinary tract symptoms 2019    Type 2 diabetes mellitus with diabetic neuropathy ([address]) 2018    Atrial fibrillation with RVR ([address]) 2018    Right middle lobe pulmonary nodule 2018    Hyperlipidemia 05/15/2014      LOS (days): 8  Geometric Mean LOS (GMLOS) (days): 3.60  Days to GMLOS:-4.3     OBJECTIVE:  Risk of Unplanned Readmission Score: 9.73         Current admission status: Inpatient   Preferred Pharmacy:   [address]  [address] [place] [address]  Phone: [phone] Fax: [address] [facility], [address] [place] [address],  [address] [place] [address],  [address] [place] [address]  Phone: [phone] Fax: [phone]    [facility]/pharmacy #[id number] [place] - [address] [facility]  [address] [place], [place] [address] [address]  Phone: [phone] Fax: [address] [place] - [address] [address]  Phone: [phone] Fax: [phone] primary Care Provider: Judy Castillo MD    Primary Insurance: [facility] REP  Secondary Insurance:     DISCHARGE DETAILS:                                [address]         Is the patient interested in [facility] at discharge?: Yes  [address] [facility] requested[de-identified] Occupational Therapy, Physical Therapy, [facility] Name[de-identified] [place] Provider[de-identified] PCP  Home Health Services Needed[de-identified] Evaluate Functional Status and Safety, Gait/ADL Training, Wound/Ostomy Care  Homebound Criteria Met[de-identified] Requires the Assistance of Another Person for Safe Ambulation or to Leave the Home, Uses an Assist Device (i.e. cane, walker, etc)  Supporting Clincal Findings[de-identified] Fatigues Easliy in Short Distances    DME Referral Provided  Referral made for DME?: Yes  DME referral completed for the following items[de-identified] Nohemi Lyn                     IMM Given (Date):: 11/01/23  IMM Given to[de-identified] Patient     Additional Comments: anticipated d/c today 11/1 home with [facility] for [facility]- PT and OT. Reviewed IMM with patient, signed and provided a copy. [facility] reserved.  walker ordered through [employer]

## 2023-11-01 NOTE — DISCHARGE SUMMARY
INTERNAL MEDICINE RESIDENCY DISCHARGE SUMMARY     Rea Cabot.   72 y.o. male  MRN: [id number]  Room/Bed: [facility] 82/[facility]     [address] [facility] 8   Encounter: [id number]    Principal Problem:    Cellulitis of left lower extremity  Active Problems:    Type 2 diabetes mellitus with diabetic neuropathy (HCC)    Atrial fibrillation with RVR ([address] )    Hyperlipidemia    Essential hypertension    Benign prostatic hyperplasia with lower urinary tract symptoms    S/P transmetatarsal amputation of foot, right (HCC)      * Cellulitis of left lower extremity  Assessment & Plan  Patient presented to ED with 1-day of increased erythema, warmth, and pain in LLE in the setting of left foot wound. S/p I&D with Podiatry on 10/26 with delayed wound closure on 10/30. Patient continues to remain afebrile without overt signs of infection. Wound cultures growing S. Aureus and mixed skin david - other cultures (AFB, anaerobic, Bcx2) negative. Wound dressing changed today by Podiatry. Plan:  -Tylenol PRN for fever, pain  -Outpatient follow-up with podiatry recommended on d/c within 1 week  -Plan for home PT/OT/VNA    Type 2 diabetes mellitus with diabetic neuropathy [facility])  Assessment & Plan  Lab Results   Component Value Date    HGBA1C 9.8 (H) 10/25/2023       Recent Labs     10/30/23  0815 10/30/23  1128 10/30/23  1600 10/30/23  2016   POCGLU 249* 226* 167* 187*       Blood Sugar Average: Last 72 hrs:    Patient states he currently uses Lantus 41 units PM, Metformin 500 mg BID - on further review of his chart, this does not appear to be accurate.     Plan:  -Monitor BG  -Increase insulin regimen to 7 units Lantus HS with 6 units Humalog before meals  -Carb-controlled diet recommended  -Follow up with PCP within 1 to 2 weeks of discharge for further titration as needed        Atrial fibrillation with RVR ([address] )  Assessment & Plan  EKG on arrival significant for "Afib RVR, incomplete RBBB, septal infarct (age undetermined). " Home regimen includes: Cardizem 90 mg BID, Eliquis 5 mg BID, Metoprolol 75 mg BID. Tachycardia partially 2/2 sepsis due to cellulitis. Rates remain improved following increases in metoprolol/Cardizem. Plan:  -Continue home Eliquis 5 mg BID  -Continue Metoprolol to 100 mg BID, Cardizem 120 BID  -Follow up with PCP within 1 to 2 weeks of discharge to reassess      S/P transmetatarsal amputation of foot, right [facility])  Assessment & Plan  Stable    Benign prostatic hyperplasia with lower urinary tract symptoms  Assessment & Plan  Patient initially presented with symptoms of increased urination. Initial urinary workup suggestive of possible infection with UA but culture with 80K S. Epidermitis, likely contaminant    Plan:  -Continue home Flomax 0.4 mg daily      Essential hypertension  Assessment & Plan  /74 on arrival. Home regimen: Cardizem 90 mg BID, Lisinopril 40 mg daily, Metoprolol XL 75 mg BID. BP remains relatively well-controlled at present. Plan:  -Continue to monitor BP  -Continue cardizem 120 mg BID  -Continue metoprolol 100 mg BID  -continue lisinopril 40 mg  -Follow up with PCP within 1 to 2 weeks of discharge    Hyperlipidemia  Assessment & Plan  Plan:  -Continue home Lipitor 40 mg daily     Sepsis (HCC)-resolved as of 10/31/2023  Assessment & Plan  On arrival patient met SIRS criteria with T 101.4, . Labs were significant for leukocytosis of 15.08. Lactic acid was normal. Procal negative. CRP and ESR elevated. MRI obtained on 10/25/23 demonstrated no definite presence of osteomyelitis. Now s/p I&D with Podiatry. Patient's signs of infection continue to improve - BC remain negative, wound cultures growing Staph aureus resistant to ampicillin but susceptible to cefazolin.     Plan:  -Blood cultures negative x2, anaerobic wound culture negative, MRSA negative, acid fast bacilli culture negative  -Urine culture demonstrated 80,000-89,000 cfu/mL of staph epidermidis  -Wound culture positive for staph aureus, given resistance profile, continue on Ancef (day 3)  -Trend WBC, fever curves  -Tylenol PRN for fever, pain  -See plan under "cellulitis of left lower extremity"        [address] [place] [address]     Patient presented to the [address] [place] [address] Emergency Department on 10/24/23 with left lower extremity swelling, redness and warmth. Patient reported that the left leg swelling and warmth began a few days before he came to the hospital, and that his podiatrist recommended he be evaluated here. He also reported that he noticed blood on the bottom of his sock and a "cut" on the bottom of his left foot, indicating that he might have stepped on something. Patient noted that he has diabetic neuropathy and does not have sensation at the bottom of his feet. Patient was found to be febrile, tachycardic, and had a leukocytosis meeting SIRS criteria and septic criteria given the left lower extremity cellulitis. Septic work up revealed negative lactic acid and procalcitonin. Patient had elevated CRP and ESR, and UA was remarkable for presence of large leukocytes, protein, and glucose. X-rays of the left foot were obtained on 10/24/23 and showed no acute osseous abnormalities. Cultures of the wound on the bottom of the left foot and urine were obtained and patient had a nasal swab for MRSA. Patient was put on vancomycin and cefepime in the ED for empiric treatment of osteomyelitis, but given the x-ray results, this was deescalated to vancomycin alone. Podiatry was consulted and evaluated the patient on 10/25/23 in which the wound on the bottom of the left foot probed to bone. Ceftriaxone was added to the antibiotic regimen to provide gram negative coverage as culture results were still pending. MRIs were obtained and revealed no indication of osteomyelitis, abscesses, or sinus tracts.  Podiatry recommended surgical wound washout and debridement of the left foot wound given its depth. Patient had an arterial doppler on the left lower extremity which showed blood flow marginally within the healing range for a diabetic patient. Incidentally, the patient was noted to be in rapid a-fib on initial presentation, likely in the setting of his acute infection. His home doses of metoprolol and cardizem were uptitrated with subsequent improvement in the patient's heart rate. Additionally, given uncontrolled hyperglycemia, the patient's insulin regimen was slowly uptitrated. Patient had the surgical wound washout and debridement on 10/26/23. During the procedure, a 2 mm piece of glass was resected from the wound, and source control was obtained. There was no evidence of osteomyelitis or abscesses and patient had no complications. Culture results from the wound revealed MSSA, and given susceptibility profile, antibiotic regimen was deescalated to cefazolin. Patient continued to improve clinically. Patient had a delayed primary closure of the left foot wound on 10/30/23 which was successful. Patient had no complications and will be discharged home with home health. He will be sent home on his increased doses of metoprolol and cardizem as well as his most recent insulin regimen (7 units Lantus, 6 units Humalog TID with meals). He will require close follow-up of these going forward. On the day of his discharge, the patient denied any symptoms and reported feeling well overall. He denied any chest pain, SOB, N/V, abdominal pain, dysuria, or other symptoms. He is aware that he will need close follow-up with both Podiatry and his PCP going forward. Physical Exam  Vitals and nursing note reviewed. Constitutional:       General: He is not in acute distress. Appearance: Normal appearance. He is not ill-appearing. HENT:      Head: Normocephalic and atraumatic.       Right Ear: External ear normal.      Left Ear: External ear normal.      Nose: Nose normal.      Mouth/Throat: Mouth: Mucous membranes are moist.      Pharynx: Oropharynx is clear. Eyes:      Extraocular Movements: Extraocular movements intact. Conjunctiva/sclera: Conjunctivae normal.      Pupils: Pupils are equal, round, and reactive to light. Cardiovascular:      Rate and Rhythm: Normal rate. Rhythm irregular. Heart sounds: Normal heart sounds. No murmur heard. Pulmonary:      Effort: Pulmonary effort is normal. No respiratory distress. Breath sounds: Normal breath sounds. Abdominal:      General: Abdomen is flat. Bowel sounds are normal. There is no distension. Palpations: Abdomen is soft. Tenderness: There is no abdominal tenderness. Musculoskeletal:         General: Normal range of motion. Cervical back: Normal range of motion. Comments: Right transmetatarsal amputation   Skin:     General: Skin is warm and dry. Capillary Refill: Capillary refill takes less than 2 seconds. Comments: LLE wound clean, dry, intact, and freshly bandaged   Neurological:      Mental Status: He is alert. Mental status is at baseline.    Psychiatric:         Mood and Affect: Mood normal.         Behavior: Behavior normal.          DISCHARGE INFORMATION     PCP at Discharge: Judy Castillo MD    Admitting Provider: Miryam Faulkner MD  Admission Date: 10/24/2023    Discharge Provider: Logan Lerma MD  Discharge Date: 11/1/2023      Discharge Disposition: Home/Self Care with home health  Discharge Condition: stable  Discharge with Lines: no    Discharge Diet: diabetic diet  Activity Restrictions: reduced weight bearing on left foot, activity as tolerated and recommended by physical therapy  Test Results Pending at Discharge: None    Discharge Diagnoses:  Principal Problem:    Cellulitis of left lower extremity  Active Problems:    Type 2 diabetes mellitus with diabetic neuropathy (HCC)    Atrial fibrillation with RVR ([address])    Hyperlipidemia    Essential hypertension    Benign prostatic hyperplasia with lower urinary tract symptoms    S/P transmetatarsal amputation of foot, right ([address])  Resolved Problems:    Sepsis ([address])      Consulting Providers:  Podiatry    Diagnostic & Therapeutic Procedures Performed:  XR foot left 3+ views    Result Date: 10/27/2023  Impression: Possible packing material plantar aspect of fourth and fifth toe. No acute osseous abnormality. Workstation performed: [id number]     MRI foot/forefoot toes left w wo contrast    Result Date: 10/26/2023  Impression: Inhomogeneous fat suppression about the distal lateral foot. Motion degradation. Within the limitations of the examination, no definite abnormal marrow signal to suggest acute osteomyelitis. Findings suggesting cellulitis along the dorsum of the foot without abscess. Additional callus with ulceration along the plantar aspect of the fourth metatarsal head. Workstation performed: [id number]     XR foot 3+ views LEFT    Result Date: 10/25/2023  Impression: No acute osseous abnormality.  Workstation performed: [id number]       Code Status: Level 1 - Full Code  Advance Directive & Living Will: <no information>  Power of :    POLST:      Medications:  START taking these medications  START taking these medications   insulin lispro 100 units/mL injection pen  Commonly known as: HumaLOG KwikPen  Inject 6 Units under the skin 3 (three) times a day with meals  Refills: 0  Last time this was given: Ask your nurse or doctor  Signed by: Basim Cabrera, DO     CHANGE how you take these medications  CHANGE how you take these medications   diltiazem 120 MG tablet  Commonly known as: CARDIZEM  Take 1 tablet (120 mg total) by mouth 2 (two) times a day  Refills: 0  Last time this was given: 120 mg on November 1, 2023  8:15 AM  Signed by: Basim Cabrera, DO  What changed:  medication strength  how much to take    Insulin Glargine Solostar 100 UNIT/ML Sopn  Commonly known as: Lantus SoloStar  Inject 7 units daily  Refills: 6  Last time this was given: Ask your nurse or doctor  Signed by: Grzegorz Renteria, DO  What changed: additional instructions    metoprolol succinate 100 mg 24 hr tablet  Commonly known as: TOPROL-XL  Take 1 tablet (100 mg total) by mouth 2 (two) times a day  Refills: 0  Last time this was given: 100 mg on November 1, 2023  8:16 AM  Signed by: Grzegorz Renteria, DO  What changed:  medication strength  how much to take  how to take this  when to take this  additional instructions     CONTINUE taking these medications  CONTINUE taking these medications   acetaminophen 650 mg CR tablet  Commonly known as: Mapap Arthritis Pain  Take 1 tablet (650 mg total) by mouth 2 (two) times a day  Refills: 1  Last time this was given: Ask your nurse or doctor  Signed by: KIRIT Baez    atorvastatin 40 mg tablet  Commonly known as: LIPITOR  Take 1 tablet (40 mg total) by mouth daily  Refills: 3  Last time this was given: 40 mg on November 1, 2023  8:15 AM  Signed by: Marylen Divine, MD    Clenpiq 10-3.5-12 MG-GM -GM/160ML Soln  Generic drug: sodium picosulfate, magnesium oxide, citric acid  Refills: 0    Eliquis 5 mg  Generic drug: apixaban  Take 1 tablet (5 mg total) by mouth 2 (two) times a day  Refills: 11  Last time this was given: 5 mg on November 1, 2023  8:14 AM  Signed by: Marylen Divine, MD    lisinopril 40 mg tablet  Commonly known as: ZESTRIL  Take 1 tablet (40 mg total) by mouth daily  Refills: 3  Last time this was given: 40 mg on November 1, 2023  8:16 AM  Signed by: Marylen Divine, MD    metFORMIN 500 mg tablet  Commonly known as: GLUCOPHAGE  Take 1 tablet (500 mg total) by mouth 2 (two) times a day with meals  Refills: 5  Signed by: Marylen Divine, MD    * OneTouch Verio test strip  Generic drug: glucose blood  Use 1 each daily Use as instructed  Refills: 3  Signed by: Marylen Divine, MD    * OneTouch Ultra test strip  Generic drug: glucose blood  Use as instructed  Refills: 3  Signed by: Marylen Divine, MD    * Accu-Chek Guide test strip  Generic drug: glucose blood  TEST BLOOD SUGARS once a day  Refills: 22  Signed by: Krissy Pepe MD    tamsulosin 0.4 mg  Commonly known as: FLOMAX  TAKE 1 CAPSULE (0.4 MG TOTAL) BY MOUTH DAILY WITH DINNER  Refills: 2  Last time this was given: 0.4 mg on October 31, 2023  5:13 PM  Signed by: Krissy Pepe MD    very important  * This list has 3 medication(s) that are the same as other medications prescribed for you. Read the directions carefully, and ask your doctor or other care provider to review them with you. OTHER medications on file  OTHER medications on file   * onetouch ultrasoft lancets  Use as instructed  Refills: 3  Signed by: Krissy Pepe MD    * onetouch ultrasoft lancets  Use daily  Refills: 3  Signed by: Krissy Pepe MD         Allergies: Allergies   Allergen Reactions    Simvastatin Myalgia    Itraconazole Other (See Comments)     Pt denies knowledge of allergy. .        FOLLOW-UP     PCP Outpatient Follow-up:  Yes, follow up with PCP Dr. Krissy Pepe in 1 to 2 weeks    Consulting Providers Follow-up:  Yes - follow-up with Podiatry within one week for wound check    Active Issues Requiring Follow-up:   Cellulitis s/p wound exploration and delayed closure, a-fib, diabetes, HTN    Discharge Statement:   I spent 1 hour minutes discharging the patient. This time was spent on the day of discharge. I had direct contact with the patient on the day of discharge. Additional documentation is required if more than 30 minutes were spent on discharge. Portions of the record may have been created with voice recognition software. Occasional wrong word or "sound a like" substitutions may have occurred due to the inherent limitations of voice recognition software. Read the chart carefully and recognize, using context, where substitutions have occurred.     ==  Gela Rivas MD  [facility]  Internal Medicine Resident PGY-1

## 2023-11-02 ENCOUNTER — TELEPHONE (OUTPATIENT)
Age: 73
End: 2023-11-02

## 2023-11-02 ENCOUNTER — RA CDI HCC (OUTPATIENT)
Dept: OTHER | Facility: HOSPITAL | Age: 73
End: 2023-11-02

## 2023-11-02 ENCOUNTER — HOME CARE VISIT (OUTPATIENT)
Dept: HOME HEALTH SERVICES | Facility: HOME HEALTHCARE | Age: 73
End: 2023-11-02
Payer: COMMERCIAL

## 2023-11-02 VITALS
RESPIRATION RATE: 20 BRPM | TEMPERATURE: 98.4 F | SYSTOLIC BLOOD PRESSURE: 110 MMHG | HEART RATE: 62 BPM | OXYGEN SATURATION: 99 % | DIASTOLIC BLOOD PRESSURE: 70 MMHG

## 2023-11-02 PROCEDURE — G0299 HHS/HOSPICE OF RN EA 15 MIN: HCPCS

## 2023-11-02 PROCEDURE — 400013 VN SOC

## 2023-11-02 NOTE — UTILIZATION REVIEW
NOTIFICATION OF ADMISSION DISCHARGE   This is a Notification of Discharge from [facility]. Please be advised that this patient has been discharge from our facility. Below you will find the admission and discharge date and time including the patient’s disposition. UTILIZATION REVIEW CONTACT:  Sebas Figueroa  Utilization   Network Utilization Review Department  Phone: [phone] x carefully listen to the prompts. All voicemails are confidential.  Email: [email or web address]     ADMISSION INFORMATION  PRESENTATION DATE: 10/24/2023 11:09 AM  OBERVATION ADMISSION DATE:   INPATIENT ADMISSION DATE: 10/24/23  1:02 PM   DISCHARGE DATE: 11/1/2023  4:00 PM   DISPOSITION:Home with [facility] Utilization Review Department  ATTENTION: Please call with any questions or concerns to [phone] and carefully listen to the prompts so that you are directed to the right person. All voicemails are confidential.   For Discharge needs, contact Care Management DC Support Team at [phone] opt. 2  Send all requests for admission clinical reviews, approved or denied determinations and any other requests to dedicated fax number below belonging to the campus where the patient is receiving treatment.  List of dedicated fax numbers for the Facilities:  [place] DENIALS (Administrative/Medical Necessity) [phone]   DISCHARGE SUPPORT TEAM (Network) [phone] [facility] ([facility]/Pediatric[facility]) [phone]   [address] [facility] [address] [place] [address] [address]  [facility] [phone]

## 2023-11-02 NOTE — TELEPHONE ENCOUNTER
I asked Dr. Ariel Guzman and he said whomever Vielka Roche wanted to see. So I call Pt and he wanted an appointment with Dr. Salima Ace. So if someone could send this to give him an appointment that would be great. Pt would like a Wednesday or Friday.

## 2023-11-02 NOTE — TELEPHONE ENCOUNTER
Hello,  Please advise if the following patient can be forced onto the schedule:    Patient: Jia Cabrera    : 1950    MRN: [id number]    Call back #: [phone]    Insurance: [employer]    Reason for appointment: Post op/had Sx w/ Dr Otis Danielle and needs post op visit with either Dr. Otis Danielle or Dr. Mario Pearson have nothing. Please return call to schedule. Requested doctor/location: Dr. Zaira Heredia or Dr. Otis Danielle at any [facility] office    Thank you.

## 2023-11-02 NOTE — PROGRESS NOTES
[address] [facility] coding opportunities       Chart reviewed, no opportunity found: CHART REVIEWED, NO OPPORTUNITY FOUND        Patients Insurance     [facility]ic[facility]

## 2023-11-06 ENCOUNTER — OFFICE VISIT (OUTPATIENT)
Dept: FAMILY MEDICINE CLINIC | Facility: CLINIC | Age: 73
End: 2023-11-06
Payer: COMMERCIAL

## 2023-11-06 VITALS
BODY MASS INDEX: 28.62 KG/M2 | TEMPERATURE: 97.3 F | WEIGHT: 235 LBS | HEART RATE: 95 BPM | OXYGEN SATURATION: 98 % | DIASTOLIC BLOOD PRESSURE: 80 MMHG | HEIGHT: 76 IN | SYSTOLIC BLOOD PRESSURE: 150 MMHG

## 2023-11-06 DIAGNOSIS — L03.116 CELLULITIS OF LEFT LOWER EXTREMITY: Primary | ICD-10-CM

## 2023-11-06 DIAGNOSIS — E11.40 TYPE 2 DIABETES MELLITUS WITH DIABETIC NEUROPATHY, WITHOUT LONG-TERM CURRENT USE OF INSULIN (HCC): Chronic | ICD-10-CM

## 2023-11-06 PROCEDURE — 99496 TRANSJ CARE MGMT HIGH F2F 7D: CPT | Performed by: FAMILY MEDICINE

## 2023-11-06 NOTE — PROGRESS NOTES
Assessment & Plan     1. Cellulitis of left lower extremity  Assessment & Plan:  LLE cellulitis, had glass in foot turned into infection. Uncontrolled DM2  - pt working on controlling on DM2  - treated and has followup with podiatry      2. Type 2 diabetes mellitus with diabetic neuropathy, without long-term current use of insulin ([facility])  Assessment & Plan:    Lab Results   Component Value Date    HGBA1C 9.8 (H) 10/25/2023     Pt uses 41 units lantus at night, metformin and humalog 6 units prior to meals  - continue this regiment   - f/u 3 months for A1c recheck             Subjective     Transitional Care Management Review:   Silvia Kim is a 72 y.o. male here for TCM follow up. During the TCM phone call patient stated:  TCM Call     Date and time call was made  11/1/2023  4:01 PM    Hospital care reviewed  Records reviewed    Patient was hospitialized at  [address]  went to [facility] rehab 4/30/21-5/12/21    Date of Admission  10/24/23    Date of discharge  11/01/23    Diagnosis  Right foot amputation    Disposition  Home    Were the patients medications reviewed and updated  Yes    Current Symptoms  None      TCM Call     Post hospital issues  --  Rehab     Should patient be enrolled in anticoag monitoring? No    Scheduled for follow up?   Yes    Patients specialists  Other (comment)    Other specialists names  Wound Care, Infectious Disease, Podiatry    Did you obtain your prescribed medications  Yes    Do you need help managing your prescriptions or medications  Yes    Is transportation to your appointment needed  No    I have advised the patient to call PCP with any new or worsening symptoms  yang freed a    Living Arrangements  Family members    Support System  None    Are you recieving any outpatient services  No    Are you recieving home care services  No    Are you using any community resources  No    Current waiver services  No    Have you fallen in the last 12 months  No    Interperter language line needed  No    Counseling  Patient    Counseling topics  Activities of daily living; Importance of RX compliance        LLE cellulitis, treated in hospital, sepsis during that time. Adequately treated, is an uncontrolled diabetic and is working on getting A1c under better control. Review of Systems   Constitutional:  Negative for chills and fever. HENT:  Negative for ear pain and sore throat. Eyes:  Negative for pain and visual disturbance. Respiratory:  Negative for cough and shortness of breath. Cardiovascular:  Negative for chest pain and palpitations. Gastrointestinal:  Negative for abdominal pain and vomiting. Genitourinary:  Negative for dysuria and hematuria. Musculoskeletal:  Negative for arthralgias and back pain. Skin:  Positive for wound. Negative for color change and rash. Neurological:  Negative for seizures and syncope. All other systems reviewed and are negative. Objective     /80 (BP Location: Left arm, Patient Position: Sitting, Cuff Size: Standard)   Pulse 95   Temp (!) 97.3 °F (36.3 °C) (Temporal)   Ht 6' 4" (1.93 m)   Wt 107 kg (235 lb)   SpO2 98%   BMI 28.61 kg/m²      Physical Exam  Vitals reviewed. Constitutional:       General: He is not in acute distress. Appearance: Normal appearance. He is well-developed. HENT:      Head: Normocephalic and atraumatic. Eyes:      Conjunctiva/sclera: Conjunctivae normal.   Cardiovascular:      Rate and Rhythm: Normal rate and regular rhythm. Heart sounds: No murmur heard. Pulmonary:      Effort: Pulmonary effort is normal. No respiratory distress. Breath sounds: Normal breath sounds. Abdominal:      Palpations: Abdomen is soft. Tenderness: There is no abdominal tenderness. Musculoskeletal:         General: No swelling. Cervical back: Neck supple. Skin:     General: Skin is warm and dry.       Capillary Refill: Capillary refill takes less than 2 seconds. Neurological:      Mental Status: He is alert.    Psychiatric:         Mood and Affect: Mood normal.       Medications have been reviewed by provider in current encounter    Zuri Honeycutt DO

## 2023-11-06 NOTE — ASSESSMENT & PLAN NOTE
Lab Results   Component Value Date    HGBA1C 9.8 (H) 10/25/2023     Pt uses 41 units lantus at night, metformin and humalog 6 units prior to meals  - continue this regiment   - f/u 3 months for A1c recheck

## 2023-11-06 NOTE — ASSESSMENT & PLAN NOTE
LLE cellulitis, had glass in foot turned into infection.  Uncontrolled DM2  - pt working on controlling on DM2  - treated and has followup with podiatry

## 2023-11-07 ENCOUNTER — OFFICE VISIT (OUTPATIENT)
Dept: PODIATRY | Facility: CLINIC | Age: 73
End: 2023-11-07

## 2023-11-07 ENCOUNTER — HOME CARE VISIT (OUTPATIENT)
Dept: HOME HEALTH SERVICES | Facility: HOME HEALTHCARE | Age: 73
End: 2023-11-07
Payer: COMMERCIAL

## 2023-11-07 VITALS
DIASTOLIC BLOOD PRESSURE: 66 MMHG | WEIGHT: 235 LBS | BODY MASS INDEX: 28.62 KG/M2 | HEIGHT: 76 IN | SYSTOLIC BLOOD PRESSURE: 106 MMHG | HEART RATE: 81 BPM

## 2023-11-07 DIAGNOSIS — S91.342D PUNCTURE WOUND OF LEFT FOOT WITH FOREIGN BODY, SUBSEQUENT ENCOUNTER: Primary | ICD-10-CM

## 2023-11-07 DIAGNOSIS — L03.116 CELLULITIS OF LEFT FOOT: ICD-10-CM

## 2023-11-07 LAB
MYCOBACTERIUM SPEC CULT: NORMAL
RHODAMINE-AURAMINE STN SPEC: NORMAL

## 2023-11-07 PROCEDURE — 99024 POSTOP FOLLOW-UP VISIT: CPT | Performed by: PODIATRIST

## 2023-11-07 PROCEDURE — G0151 HHCP-SERV OF PT,EA 15 MIN: HCPCS

## 2023-11-07 RX ORDER — CEPHALEXIN 500 MG/1
500 CAPSULE ORAL EVERY 6 HOURS SCHEDULED
Qty: 28 CAPSULE | Refills: 0 | Status: SHIPPED | OUTPATIENT
Start: 2023-11-07 | End: 2023-11-14

## 2023-11-07 NOTE — PROGRESS NOTES
PATIENT:  Nafisa Piña.      1950    ASSESSMENT     1. Puncture wound of left foot with foreign body, subsequent encounter  Orthowedge Shoe      2. Cellulitis of left foot  cephalexin (KEFLEX) 500 mg capsule             PLAN  Stressed on patient compliance about proper off-loading, staying off of feet, and proper dressing care. Communicated with [facility] about wound care. Use wet to dry with betadine. Rx: Keflex to reduce bioburden. NWB left foot. Orthowedge shoe for protection. Call if any increase in pain, fevers, calf pain, shortness of breath, or general distress is noted. Patient instructed to go to ER if call is not returned immediately. RA in 1 week. HISTORY OF PRESENT ILLNESS  Patient presents for post-op appointment. S/P I&D and DPC of left foot. He has home nurse for wound care. No pain. He presents with no protective shoes. REVIEW OF SYSTEMS  GENERAL: No fever or chills. HEART: No chest pain, or palpitation  RESPIRATORY:  No SOB or cough  GI: No Nausea, vomit or diarrhea  NEUROLOGIC: No syncope or acute weakness    PHYSICAL EXAMINATION    /66   Pulse 81   Ht 6' 4" (1.93 m)   Wt 107 kg (235 lb)   BMI 28.61 kg/m²     GENERAL  The patient appears in NAD / non-toxic. Afebrile. VSS    VASCULAR EXAM  Pedal pulses and vascular status are intact. No calf pain or edema bilaterally. No cyanosis. DERMATOLOGIC EXAM  He presents with wet dressing. Full of dog hairs on the dressing. Incision co-apted. Maceration noted around the incision left 3rd interspace. Mild redness left dorsal foot without clinical signs of abscess. NEUROLOGIC EXAM  AAO X 3. No focal neurologic deficit. Decreased sensation left foot due to neuropathy. MUSCULOSKELETAL EXAM  ROM intact. No fluctuation or crepitus.

## 2023-11-08 ENCOUNTER — HOME CARE VISIT (OUTPATIENT)
Dept: HOME HEALTH SERVICES | Facility: HOME HEALTHCARE | Age: 73
End: 2023-11-08
Payer: COMMERCIAL

## 2023-11-08 VITALS — HEART RATE: 64 BPM | OXYGEN SATURATION: 98 %

## 2023-11-08 VITALS — HEART RATE: 66 BPM | OXYGEN SATURATION: 99 % | DIASTOLIC BLOOD PRESSURE: 68 MMHG | SYSTOLIC BLOOD PRESSURE: 110 MMHG

## 2023-11-08 PROCEDURE — G0152 HHCP-SERV OF OT,EA 15 MIN: HCPCS

## 2023-11-08 NOTE — CASE COMMUNICATION
Patient  indepenent  with  transfers  and  ambulation  for  short  distaces  within  home. Patients  primary  means  of  mobility  at  this  time  is  at  [facility]  level  and  patient  is  independent  with  same. Patient  with  4plus  out  of  5  strength  througnhout  and  is  independent  with  HEP.   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

## 2023-11-09 ENCOUNTER — HOME CARE VISIT (OUTPATIENT)
Dept: HOME HEALTH SERVICES | Facility: HOME HEALTHCARE | Age: 73
End: 2023-11-09
Payer: COMMERCIAL

## 2023-11-09 VITALS
RESPIRATION RATE: 18 BRPM | SYSTOLIC BLOOD PRESSURE: 116 MMHG | TEMPERATURE: 98 F | OXYGEN SATURATION: 98 % | HEART RATE: 80 BPM | DIASTOLIC BLOOD PRESSURE: 70 MMHG

## 2023-11-09 PROCEDURE — G0299 HHS/HOSPICE OF RN EA 15 MIN: HCPCS

## 2023-11-11 ENCOUNTER — HOME CARE VISIT (OUTPATIENT)
Dept: HOME HEALTH SERVICES | Facility: HOME HEALTHCARE | Age: 73
End: 2023-11-11
Payer: COMMERCIAL

## 2023-11-11 VITALS
DIASTOLIC BLOOD PRESSURE: 76 MMHG | SYSTOLIC BLOOD PRESSURE: 150 MMHG | RESPIRATION RATE: 18 BRPM | HEART RATE: 88 BPM | TEMPERATURE: 99.3 F | OXYGEN SATURATION: 97 %

## 2023-11-11 PROCEDURE — G0299 HHS/HOSPICE OF RN EA 15 MIN: HCPCS

## 2023-11-13 ENCOUNTER — HOME CARE VISIT (OUTPATIENT)
Dept: HOME HEALTH SERVICES | Facility: HOME HEALTHCARE | Age: 73
End: 2023-11-13
Payer: COMMERCIAL

## 2023-11-13 VITALS
DIASTOLIC BLOOD PRESSURE: 78 MMHG | HEART RATE: 74 BPM | RESPIRATION RATE: 18 BRPM | TEMPERATURE: 98.3 F | SYSTOLIC BLOOD PRESSURE: 158 MMHG | OXYGEN SATURATION: 97 %

## 2023-11-13 PROCEDURE — G0299 HHS/HOSPICE OF RN EA 15 MIN: HCPCS

## 2023-11-14 ENCOUNTER — OFFICE VISIT (OUTPATIENT)
Dept: PODIATRY | Facility: CLINIC | Age: 73
End: 2023-11-14
Payer: COMMERCIAL

## 2023-11-14 VITALS
BODY MASS INDEX: 28.62 KG/M2 | HEIGHT: 76 IN | WEIGHT: 235 LBS | SYSTOLIC BLOOD PRESSURE: 154 MMHG | DIASTOLIC BLOOD PRESSURE: 80 MMHG | HEART RATE: 72 BPM

## 2023-11-14 DIAGNOSIS — L85.1 ACQUIRED KERATODERMA: ICD-10-CM

## 2023-11-14 DIAGNOSIS — S91.342D PUNCTURE WOUND OF LEFT FOOT WITH FOREIGN BODY, SUBSEQUENT ENCOUNTER: ICD-10-CM

## 2023-11-14 DIAGNOSIS — E11.40 TYPE 2 DIABETES MELLITUS WITH DIABETIC NEUROPATHY, WITHOUT LONG-TERM CURRENT USE OF INSULIN (HCC): Primary | ICD-10-CM

## 2023-11-14 LAB
MYCOBACTERIUM SPEC CULT: NORMAL
RHODAMINE-AURAMINE STN SPEC: NORMAL

## 2023-11-14 PROCEDURE — 11055 PARING/CUTG B9 HYPRKER LES 1: CPT | Performed by: PODIATRIST

## 2023-11-14 NOTE — PROGRESS NOTES
PATIENT:  Castro Sarkar.      1950    ASSESSMENT     1. Type 2 diabetes mellitus with diabetic neuropathy, without long-term current use of insulin ([address])        2. Puncture wound of left foot with foreign body, subsequent encounter        3. Acquired keratoderma               PLAN  Sutures removed left 3rd interspace and dorsal foot. Wet betadine dressing applied. Stressed on patient compliance about proper off-loading, staying off of feet, and proper dressing care. NWB left foot. Orthowedge shoe for protection. Call if any increase in pain, fevers, calf pain, shortness of breath, or general distress is noted. Patient instructed to go to ER if call is not returned immediately. RA in 1 week. Procedure: All hyperkeratotic skin lesion(s) were sharply pared with a scalpel with no bleeding or evidence of ulceration. Patient tolerated procedure(s) well without complications. HISTORY OF PRESENT ILLNESS  Patient presents for post-op appointment. S/P I&D and DPC of left foot. No pain. He complained of pain associated with callus on amputation site of right foot. REVIEW OF SYSTEMS  GENERAL: No fever or chills. HEART: No chest pain, or palpitation  RESPIRATORY:  No SOB or cough  GI: No Nausea, vomit or diarrhea  NEUROLOGIC: No syncope or acute weakness    PHYSICAL EXAMINATION    /80   Pulse 72   Ht 6' 4" (1.93 m)   Wt 107 kg (235 lb)   BMI 28.61 kg/m²     GENERAL  The patient appears in NAD / non-toxic. Afebrile. VSS    VASCULAR EXAM  Pedal pulses and vascular status are intact. No calf pain or edema bilaterally. No cyanosis. DERMATOLOGIC EXAM  Maceration still presents around the incision left 3rd interspace. Sutures in left 3rd interspace pulled off. Redness left dorsal foot resolved. HPK right lateral distal foot. NEUROLOGIC EXAM  AAO X 3. No focal neurologic deficit. Decreased sensation left foot due to neuropathy. MUSCULOSKELETAL EXAM  ROM intact. No fluctuation or crepitus.

## 2023-11-16 ENCOUNTER — HOME CARE VISIT (OUTPATIENT)
Dept: HOME HEALTH SERVICES | Facility: HOME HEALTHCARE | Age: 73
End: 2023-11-16
Payer: COMMERCIAL

## 2023-11-16 VITALS
SYSTOLIC BLOOD PRESSURE: 132 MMHG | TEMPERATURE: 98.6 F | OXYGEN SATURATION: 97 % | DIASTOLIC BLOOD PRESSURE: 78 MMHG | HEART RATE: 86 BPM | RESPIRATION RATE: 18 BRPM

## 2023-11-16 PROCEDURE — G0299 HHS/HOSPICE OF RN EA 15 MIN: HCPCS

## 2023-11-18 ENCOUNTER — HOME CARE VISIT (OUTPATIENT)
Dept: HOME HEALTH SERVICES | Facility: HOME HEALTHCARE | Age: 73
End: 2023-11-18
Payer: COMMERCIAL

## 2023-11-18 VITALS
TEMPERATURE: 98.4 F | OXYGEN SATURATION: 96 % | SYSTOLIC BLOOD PRESSURE: 154 MMHG | RESPIRATION RATE: 18 BRPM | HEART RATE: 72 BPM | DIASTOLIC BLOOD PRESSURE: 80 MMHG

## 2023-11-18 PROCEDURE — G0300 HHS/HOSPICE OF LPN EA 15 MIN: HCPCS

## 2023-11-20 ENCOUNTER — HOME CARE VISIT (OUTPATIENT)
Dept: HOME HEALTH SERVICES | Facility: HOME HEALTHCARE | Age: 73
End: 2023-11-20
Payer: COMMERCIAL

## 2023-11-20 VITALS
TEMPERATURE: 98.7 F | DIASTOLIC BLOOD PRESSURE: 78 MMHG | OXYGEN SATURATION: 97 % | HEART RATE: 58 BPM | RESPIRATION RATE: 18 BRPM | SYSTOLIC BLOOD PRESSURE: 146 MMHG

## 2023-11-20 PROCEDURE — G0299 HHS/HOSPICE OF RN EA 15 MIN: HCPCS

## 2023-11-21 ENCOUNTER — OFFICE VISIT (OUTPATIENT)
Dept: PODIATRY | Facility: CLINIC | Age: 73
End: 2023-11-21

## 2023-11-21 VITALS
DIASTOLIC BLOOD PRESSURE: 79 MMHG | BODY MASS INDEX: 28.61 KG/M2 | SYSTOLIC BLOOD PRESSURE: 122 MMHG | HEART RATE: 84 BPM | HEIGHT: 76 IN

## 2023-11-21 DIAGNOSIS — T81.31XA DISRUPTION OF EXTERNAL SURGICAL WOUND, INITIAL ENCOUNTER: Primary | ICD-10-CM

## 2023-11-21 DIAGNOSIS — S91.342D PUNCTURE WOUND OF LEFT FOOT WITH FOREIGN BODY, SUBSEQUENT ENCOUNTER: ICD-10-CM

## 2023-11-21 DIAGNOSIS — E11.40 TYPE 2 DIABETES MELLITUS WITH DIABETIC NEUROPATHY, WITHOUT LONG-TERM CURRENT USE OF INSULIN (HCC): ICD-10-CM

## 2023-11-21 LAB
MYCOBACTERIUM SPEC CULT: NORMAL
RHODAMINE-AURAMINE STN SPEC: NORMAL

## 2023-11-21 PROCEDURE — 99024 POSTOP FOLLOW-UP VISIT: CPT | Performed by: PODIATRIST

## 2023-11-21 NOTE — PROGRESS NOTES
PATIENT:  Shanti Hernandez.      1950    ASSESSMENT     1. Disruption of external surgical wound, initial encounter        2. Puncture wound of left foot with foreign body, subsequent encounter        3. Type 2 diabetes mellitus with diabetic neuropathy, without long-term current use of insulin ([address])                 PLAN  All sutures removed. Removed skin slough. Wound is mostly superficial. No deep probing. New ulcer in left 2nd toe. Will use silver alginate / betadine. Stressed on patient compliance about proper off-loading, staying off of feet, and proper dressing care. NWB left foot. Orthowedge shoe for protection. Call if any increase in pain, fevers, calf pain, shortness of breath, or general distress is noted. Patient instructed to go to ER if call is not returned immediately. RA in 1 week. HISTORY OF PRESENT ILLNESS  Patient presents for post-op appointment. S/P I&D and DPC of left foot. No pain. No new complaint. REVIEW OF SYSTEMS  GENERAL: No fever or chills. HEART: No chest pain, or palpitation  RESPIRATORY:  No SOB or cough  GI: No Nausea, vomit or diarrhea  NEUROLOGIC: No syncope or acute weakness    PHYSICAL EXAMINATION    /79   Pulse 84   Ht 6' 4" (1.93 m)   BMI 28.61 kg/m²     GENERAL  The patient appears in NAD / non-toxic. Afebrile. VSS    VASCULAR EXAM  Pedal pulses and vascular status are intact. No calf pain or edema bilaterally. No cyanosis. DERMATOLOGIC EXAM  Decreased maceration around the incision. Epidermolysis / skin slough noted. New ulcer in medial left 2nd toe. No signs of active infection in left foot. NEUROLOGIC EXAM  AAO X 3. No focal neurologic deficit. Decreased sensation left foot due to neuropathy. MUSCULOSKELETAL EXAM  ROM intact. No fluctuation or crepitus.

## 2023-11-22 ENCOUNTER — HOME CARE VISIT (OUTPATIENT)
Dept: HOME HEALTH SERVICES | Facility: HOME HEALTHCARE | Age: 73
End: 2023-11-22
Payer: COMMERCIAL

## 2023-11-24 ENCOUNTER — HOME CARE VISIT (OUTPATIENT)
Dept: HOME HEALTH SERVICES | Facility: HOME HEALTHCARE | Age: 73
End: 2023-11-24
Payer: COMMERCIAL

## 2023-11-24 VITALS
SYSTOLIC BLOOD PRESSURE: 142 MMHG | OXYGEN SATURATION: 99 % | DIASTOLIC BLOOD PRESSURE: 70 MMHG | RESPIRATION RATE: 18 BRPM | HEART RATE: 96 BPM | TEMPERATURE: 98.5 F

## 2023-11-24 DIAGNOSIS — E11.40 TYPE 2 DIABETES MELLITUS WITH DIABETIC NEUROPATHY, WITHOUT LONG-TERM CURRENT USE OF INSULIN (HCC): Chronic | ICD-10-CM

## 2023-11-24 PROCEDURE — G0299 HHS/HOSPICE OF RN EA 15 MIN: HCPCS

## 2023-11-25 RX ORDER — INSULIN LISPRO 100 [IU]/ML
6 INJECTION, SOLUTION INTRAVENOUS; SUBCUTANEOUS
Qty: 5.4 ML | Refills: 0 | Status: SHIPPED | OUTPATIENT
Start: 2023-11-25 | End: 2023-12-25

## 2023-11-27 ENCOUNTER — HOME CARE VISIT (OUTPATIENT)
Dept: HOME HEALTH SERVICES | Facility: HOME HEALTHCARE | Age: 73
End: 2023-11-27
Payer: COMMERCIAL

## 2023-11-27 ENCOUNTER — APPOINTMENT (EMERGENCY)
Dept: RADIOLOGY | Facility: HOSPITAL | Age: 73
DRG: 617 | End: 2023-11-27
Payer: COMMERCIAL

## 2023-11-27 ENCOUNTER — HOSPITAL ENCOUNTER (INPATIENT)
Facility: HOSPITAL | Age: 73
LOS: 3 days | Discharge: HOME HEALTH | DRG: 617 | End: 2023-12-01
Attending: EMERGENCY MEDICINE | Admitting: INTERNAL MEDICINE
Payer: COMMERCIAL

## 2023-11-27 VITALS
DIASTOLIC BLOOD PRESSURE: 76 MMHG | OXYGEN SATURATION: 97 % | TEMPERATURE: 98 F | RESPIRATION RATE: 18 BRPM | SYSTOLIC BLOOD PRESSURE: 132 MMHG | HEART RATE: 66 BPM

## 2023-11-27 DIAGNOSIS — E11.40 TYPE 2 DIABETES MELLITUS WITH DIABETIC NEUROPATHY, WITHOUT LONG-TERM CURRENT USE OF INSULIN (HCC): Chronic | ICD-10-CM

## 2023-11-27 DIAGNOSIS — S91.342D PUNCTURE WOUND OF LEFT FOOT WITH FOREIGN BODY, SUBSEQUENT ENCOUNTER: Primary | ICD-10-CM

## 2023-11-27 DIAGNOSIS — M86.179 OSTEOMYELITIS OF FOOT, ACUTE (HCC): ICD-10-CM

## 2023-11-27 DIAGNOSIS — L03.116 CELLULITIS OF LEFT LOWER EXTREMITY: ICD-10-CM

## 2023-11-27 DIAGNOSIS — I10 ESSENTIAL HYPERTENSION: Chronic | ICD-10-CM

## 2023-11-27 LAB
ALBUMIN SERPL BCP-MCNC: 4.2 G/DL (ref 3.5–5)
ALP SERPL-CCNC: 121 U/L (ref 34–104)
ALT SERPL W P-5'-P-CCNC: 16 U/L (ref 7–52)
ANION GAP SERPL CALCULATED.3IONS-SCNC: 7 MMOL/L
AST SERPL W P-5'-P-CCNC: 18 U/L (ref 13–39)
BASOPHILS # BLD AUTO: 0.05 THOUSANDS/ÂΜL (ref 0–0.1)
BASOPHILS NFR BLD AUTO: 1 % (ref 0–1)
BILIRUB SERPL-MCNC: 0.53 MG/DL (ref 0.2–1)
BUN SERPL-MCNC: 20 MG/DL (ref 5–25)
CALCIUM SERPL-MCNC: 9.6 MG/DL (ref 8.4–10.2)
CHLORIDE SERPL-SCNC: 106 MMOL/L (ref 96–108)
CO2 SERPL-SCNC: 23 MMOL/L (ref 21–32)
CREAT SERPL-MCNC: 0.95 MG/DL (ref 0.6–1.3)
CRP SERPL QL: 33 MG/L
EOSINOPHIL # BLD AUTO: 0.3 THOUSAND/ÂΜL (ref 0–0.61)
EOSINOPHIL NFR BLD AUTO: 3 % (ref 0–6)
ERYTHROCYTE [DISTWIDTH] IN BLOOD BY AUTOMATED COUNT: 14.1 % (ref 11.6–15.1)
ERYTHROCYTE [SEDIMENTATION RATE] IN BLOOD: 70 MM/HOUR (ref 0–19)
GFR SERPL CREATININE-BSD FRML MDRD: 80 ML/MIN/1.73SQ M
GLUCOSE SERPL-MCNC: 117 MG/DL (ref 65–140)
GLUCOSE SERPL-MCNC: 211 MG/DL (ref 65–140)
HCT VFR BLD AUTO: 43.4 % (ref 36.5–49.3)
HGB BLD-MCNC: 14.3 G/DL (ref 12–17)
IMM GRANULOCYTES # BLD AUTO: 0.05 THOUSAND/UL (ref 0–0.2)
IMM GRANULOCYTES NFR BLD AUTO: 1 % (ref 0–2)
LYMPHOCYTES # BLD AUTO: 2.48 THOUSANDS/ÂΜL (ref 0.6–4.47)
LYMPHOCYTES NFR BLD AUTO: 25 % (ref 14–44)
MCH RBC QN AUTO: 31.6 PG (ref 26.8–34.3)
MCHC RBC AUTO-ENTMCNC: 32.9 G/DL (ref 31.4–37.4)
MCV RBC AUTO: 96 FL (ref 82–98)
MONOCYTES # BLD AUTO: 0.61 THOUSAND/ÂΜL (ref 0.17–1.22)
MONOCYTES NFR BLD AUTO: 6 % (ref 4–12)
NEUTROPHILS # BLD AUTO: 6.36 THOUSANDS/ÂΜL (ref 1.85–7.62)
NEUTS SEG NFR BLD AUTO: 64 % (ref 43–75)
NRBC BLD AUTO-RTO: 0 /100 WBCS
PLATELET # BLD AUTO: 201 THOUSANDS/UL (ref 149–390)
PMV BLD AUTO: 10.6 FL (ref 8.9–12.7)
POTASSIUM SERPL-SCNC: 5 MMOL/L (ref 3.5–5.3)
PROT SERPL-MCNC: 8.1 G/DL (ref 6.4–8.4)
RBC # BLD AUTO: 4.53 MILLION/UL (ref 3.88–5.62)
SODIUM SERPL-SCNC: 136 MMOL/L (ref 135–147)
WBC # BLD AUTO: 9.85 THOUSAND/UL (ref 4.31–10.16)

## 2023-11-27 PROCEDURE — 99285 EMERGENCY DEPT VISIT HI MDM: CPT

## 2023-11-27 PROCEDURE — G0299 HHS/HOSPICE OF RN EA 15 MIN: HCPCS

## 2023-11-27 PROCEDURE — 80053 COMPREHEN METABOLIC PANEL: CPT

## 2023-11-27 PROCEDURE — 87040 BLOOD CULTURE FOR BACTERIA: CPT

## 2023-11-27 PROCEDURE — 86140 C-REACTIVE PROTEIN: CPT

## 2023-11-27 PROCEDURE — 87205 SMEAR GRAM STAIN: CPT

## 2023-11-27 PROCEDURE — 73630 X-RAY EXAM OF FOOT: CPT

## 2023-11-27 PROCEDURE — 87147 CULTURE TYPE IMMUNOLOGIC: CPT

## 2023-11-27 PROCEDURE — 99285 EMERGENCY DEPT VISIT HI MDM: CPT | Performed by: EMERGENCY MEDICINE

## 2023-11-27 PROCEDURE — 85025 COMPLETE CBC W/AUTO DIFF WBC: CPT

## 2023-11-27 PROCEDURE — 99024 POSTOP FOLLOW-UP VISIT: CPT | Performed by: PODIATRIST

## 2023-11-27 PROCEDURE — 36415 COLL VENOUS BLD VENIPUNCTURE: CPT

## 2023-11-27 PROCEDURE — 87075 CULTR BACTERIA EXCEPT BLOOD: CPT

## 2023-11-27 PROCEDURE — 85652 RBC SED RATE AUTOMATED: CPT

## 2023-11-27 PROCEDURE — 82948 REAGENT STRIP/BLOOD GLUCOSE: CPT

## 2023-11-27 PROCEDURE — 87070 CULTURE OTHR SPECIMN AEROBIC: CPT

## 2023-11-27 PROCEDURE — 87076 CULTURE ANAEROBE IDENT EACH: CPT

## 2023-11-27 PROCEDURE — 87186 SC STD MICRODIL/AGAR DIL: CPT

## 2023-11-27 RX ORDER — METOPROLOL SUCCINATE 100 MG/1
100 TABLET, EXTENDED RELEASE ORAL 2 TIMES DAILY
Status: DISCONTINUED | OUTPATIENT
Start: 2023-11-27 | End: 2023-12-01 | Stop reason: HOSPADM

## 2023-11-27 RX ORDER — LISINOPRIL 20 MG/1
40 TABLET ORAL DAILY
Status: DISCONTINUED | OUTPATIENT
Start: 2023-11-28 | End: 2023-12-01 | Stop reason: HOSPADM

## 2023-11-27 RX ORDER — SENNOSIDES 8.6 MG
1 TABLET ORAL DAILY
Status: DISCONTINUED | OUTPATIENT
Start: 2023-11-28 | End: 2023-12-01 | Stop reason: HOSPADM

## 2023-11-27 RX ORDER — INSULIN LISPRO 100 [IU]/ML
1-5 INJECTION, SOLUTION INTRAVENOUS; SUBCUTANEOUS
Status: DISCONTINUED | OUTPATIENT
Start: 2023-11-27 | End: 2023-12-01 | Stop reason: HOSPADM

## 2023-11-27 RX ORDER — ACETAMINOPHEN 325 MG/1
650 TABLET ORAL EVERY 6 HOURS PRN
Status: DISCONTINUED | OUTPATIENT
Start: 2023-11-27 | End: 2023-12-01 | Stop reason: HOSPADM

## 2023-11-27 RX ORDER — MAGNESIUM HYDROXIDE/ALUMINUM HYDROXICE/SIMETHICONE 120; 1200; 1200 MG/30ML; MG/30ML; MG/30ML
30 SUSPENSION ORAL EVERY 6 HOURS PRN
Status: DISCONTINUED | OUTPATIENT
Start: 2023-11-27 | End: 2023-12-01 | Stop reason: HOSPADM

## 2023-11-27 RX ORDER — INSULIN LISPRO 100 [IU]/ML
1-6 INJECTION, SOLUTION INTRAVENOUS; SUBCUTANEOUS
Status: DISCONTINUED | OUTPATIENT
Start: 2023-11-28 | End: 2023-12-01 | Stop reason: HOSPADM

## 2023-11-27 RX ORDER — ONDANSETRON 2 MG/ML
4 INJECTION INTRAMUSCULAR; INTRAVENOUS EVERY 6 HOURS PRN
Status: DISCONTINUED | OUTPATIENT
Start: 2023-11-27 | End: 2023-12-01 | Stop reason: HOSPADM

## 2023-11-27 RX ORDER — INSULIN GLARGINE 100 [IU]/ML
30 INJECTION, SOLUTION SUBCUTANEOUS
Status: DISCONTINUED | OUTPATIENT
Start: 2023-11-27 | End: 2023-12-01 | Stop reason: HOSPADM

## 2023-11-27 RX ORDER — DILTIAZEM HYDROCHLORIDE 60 MG/1
120 TABLET, FILM COATED ORAL 2 TIMES DAILY
Status: DISCONTINUED | OUTPATIENT
Start: 2023-11-27 | End: 2023-12-01 | Stop reason: HOSPADM

## 2023-11-27 RX ORDER — ATORVASTATIN CALCIUM 40 MG/1
40 TABLET, FILM COATED ORAL DAILY
Status: DISCONTINUED | OUTPATIENT
Start: 2023-11-28 | End: 2023-12-01 | Stop reason: HOSPADM

## 2023-11-27 RX ORDER — HEPARIN SODIUM 1000 [USP'U]/ML
8400 INJECTION, SOLUTION INTRAVENOUS; SUBCUTANEOUS EVERY 6 HOURS PRN
Status: DISCONTINUED | OUTPATIENT
Start: 2023-11-27 | End: 2023-11-29

## 2023-11-27 RX ORDER — HEPARIN SODIUM 1000 [USP'U]/ML
8400 INJECTION, SOLUTION INTRAVENOUS; SUBCUTANEOUS ONCE
Status: COMPLETED | OUTPATIENT
Start: 2023-11-27 | End: 2023-11-28

## 2023-11-27 RX ORDER — HEPARIN SODIUM 1000 [USP'U]/ML
4200 INJECTION, SOLUTION INTRAVENOUS; SUBCUTANEOUS EVERY 6 HOURS PRN
Status: DISCONTINUED | OUTPATIENT
Start: 2023-11-27 | End: 2023-11-29

## 2023-11-27 RX ORDER — HYDRALAZINE HYDROCHLORIDE 20 MG/ML
5 INJECTION INTRAMUSCULAR; INTRAVENOUS EVERY 6 HOURS PRN
Status: DISCONTINUED | OUTPATIENT
Start: 2023-11-27 | End: 2023-11-28

## 2023-11-27 RX ORDER — HEPARIN SODIUM 10000 [USP'U]/100ML
3-30 INJECTION, SOLUTION INTRAVENOUS
Status: DISCONTINUED | OUTPATIENT
Start: 2023-11-27 | End: 2023-11-29

## 2023-11-27 RX ORDER — TAMSULOSIN HYDROCHLORIDE 0.4 MG/1
0.4 CAPSULE ORAL
Status: DISCONTINUED | OUTPATIENT
Start: 2023-11-28 | End: 2023-12-01 | Stop reason: HOSPADM

## 2023-11-27 RX ORDER — HEPARIN SODIUM 5000 [USP'U]/ML
5000 INJECTION, SOLUTION INTRAVENOUS; SUBCUTANEOUS EVERY 8 HOURS SCHEDULED
Status: DISCONTINUED | OUTPATIENT
Start: 2023-11-27 | End: 2023-11-27

## 2023-11-27 RX ADMIN — HEPARIN SODIUM 18 UNITS/KG/HR: 10000 INJECTION, SOLUTION INTRAVENOUS at 23:50

## 2023-11-28 ENCOUNTER — APPOINTMENT (OUTPATIENT)
Dept: RADIOLOGY | Facility: HOSPITAL | Age: 73
DRG: 617 | End: 2023-11-28
Payer: COMMERCIAL

## 2023-11-28 ENCOUNTER — HOME CARE VISIT (OUTPATIENT)
Dept: HOME HEALTH SERVICES | Facility: HOME HEALTHCARE | Age: 73
End: 2023-11-28
Payer: COMMERCIAL

## 2023-11-28 LAB
ALBUMIN SERPL BCP-MCNC: 3.5 G/DL (ref 3.5–5)
ALP SERPL-CCNC: 98 U/L (ref 34–104)
ALT SERPL W P-5'-P-CCNC: 16 U/L (ref 7–52)
ANION GAP SERPL CALCULATED.3IONS-SCNC: 7 MMOL/L
APTT PPP: 32 SECONDS (ref 23–37)
APTT PPP: 35 SECONDS (ref 23–37)
APTT PPP: 65 SECONDS (ref 23–37)
APTT PPP: 71 SECONDS (ref 23–37)
AST SERPL W P-5'-P-CCNC: 11 U/L (ref 13–39)
BASOPHILS # BLD AUTO: 0.04 THOUSANDS/ÂΜL (ref 0–0.1)
BASOPHILS NFR BLD AUTO: 1 % (ref 0–1)
BILIRUB SERPL-MCNC: 0.56 MG/DL (ref 0.2–1)
BUN SERPL-MCNC: 15 MG/DL (ref 5–25)
CALCIUM SERPL-MCNC: 8.7 MG/DL (ref 8.4–10.2)
CHLORIDE SERPL-SCNC: 104 MMOL/L (ref 96–108)
CO2 SERPL-SCNC: 28 MMOL/L (ref 21–32)
CREAT SERPL-MCNC: 0.84 MG/DL (ref 0.6–1.3)
EOSINOPHIL # BLD AUTO: 0.33 THOUSAND/ÂΜL (ref 0–0.61)
EOSINOPHIL NFR BLD AUTO: 4 % (ref 0–6)
ERYTHROCYTE [DISTWIDTH] IN BLOOD BY AUTOMATED COUNT: 14 % (ref 11.6–15.1)
GFR SERPL CREATININE-BSD FRML MDRD: 88 ML/MIN/1.73SQ M
GLUCOSE P FAST SERPL-MCNC: 195 MG/DL (ref 65–99)
GLUCOSE SERPL-MCNC: 184 MG/DL (ref 65–140)
GLUCOSE SERPL-MCNC: 195 MG/DL (ref 65–140)
GLUCOSE SERPL-MCNC: 238 MG/DL (ref 65–140)
GLUCOSE SERPL-MCNC: 256 MG/DL (ref 65–140)
GLUCOSE SERPL-MCNC: 98 MG/DL (ref 65–140)
HCT VFR BLD AUTO: 38.8 % (ref 36.5–49.3)
HGB BLD-MCNC: 12.9 G/DL (ref 12–17)
IMM GRANULOCYTES # BLD AUTO: 0.02 THOUSAND/UL (ref 0–0.2)
IMM GRANULOCYTES NFR BLD AUTO: 0 % (ref 0–2)
INR PPP: 1.29 (ref 0.84–1.19)
INR PPP: 1.32 (ref 0.84–1.19)
LYMPHOCYTES # BLD AUTO: 2.94 THOUSANDS/ÂΜL (ref 0.6–4.47)
LYMPHOCYTES NFR BLD AUTO: 34 % (ref 14–44)
MAGNESIUM SERPL-MCNC: 1.9 MG/DL (ref 1.9–2.7)
MCH RBC QN AUTO: 31.7 PG (ref 26.8–34.3)
MCHC RBC AUTO-ENTMCNC: 33.2 G/DL (ref 31.4–37.4)
MCV RBC AUTO: 95 FL (ref 82–98)
MONOCYTES # BLD AUTO: 0.69 THOUSAND/ÂΜL (ref 0.17–1.22)
MONOCYTES NFR BLD AUTO: 8 % (ref 4–12)
MYCOBACTERIUM SPEC CULT: NORMAL
NEUTROPHILS # BLD AUTO: 4.57 THOUSANDS/ÂΜL (ref 1.85–7.62)
NEUTS SEG NFR BLD AUTO: 53 % (ref 43–75)
NRBC BLD AUTO-RTO: 0 /100 WBCS
PHOSPHATE SERPL-MCNC: 3.2 MG/DL (ref 2.3–4.1)
PLATELET # BLD AUTO: 198 THOUSANDS/UL (ref 149–390)
PLATELET # BLD AUTO: 205 THOUSANDS/UL (ref 149–390)
PMV BLD AUTO: 10.8 FL (ref 8.9–12.7)
PMV BLD AUTO: 10.8 FL (ref 8.9–12.7)
POTASSIUM SERPL-SCNC: 4 MMOL/L (ref 3.5–5.3)
PROT SERPL-MCNC: 6.7 G/DL (ref 6.4–8.4)
PROTHROMBIN TIME: 16 SECONDS (ref 11.6–14.5)
PROTHROMBIN TIME: 16.2 SECONDS (ref 11.6–14.5)
RBC # BLD AUTO: 4.07 MILLION/UL (ref 3.88–5.62)
RHODAMINE-AURAMINE STN SPEC: NORMAL
SODIUM SERPL-SCNC: 139 MMOL/L (ref 135–147)
WBC # BLD AUTO: 8.59 THOUSAND/UL (ref 4.31–10.16)

## 2023-11-28 PROCEDURE — 85730 THROMBOPLASTIN TIME PARTIAL: CPT | Performed by: INTERNAL MEDICINE

## 2023-11-28 PROCEDURE — 97166 OT EVAL MOD COMPLEX 45 MIN: CPT

## 2023-11-28 PROCEDURE — 80053 COMPREHEN METABOLIC PANEL: CPT

## 2023-11-28 PROCEDURE — 85049 AUTOMATED PLATELET COUNT: CPT

## 2023-11-28 PROCEDURE — 97162 PT EVAL MOD COMPLEX 30 MIN: CPT

## 2023-11-28 PROCEDURE — 99223 1ST HOSP IP/OBS HIGH 75: CPT | Performed by: INTERNAL MEDICINE

## 2023-11-28 PROCEDURE — 85025 COMPLETE CBC W/AUTO DIFF WBC: CPT

## 2023-11-28 PROCEDURE — 85730 THROMBOPLASTIN TIME PARTIAL: CPT

## 2023-11-28 PROCEDURE — A9585 GADOBUTROL INJECTION: HCPCS | Performed by: INTERNAL MEDICINE

## 2023-11-28 PROCEDURE — 99024 POSTOP FOLLOW-UP VISIT: CPT | Performed by: PODIATRIST

## 2023-11-28 PROCEDURE — 83735 ASSAY OF MAGNESIUM: CPT

## 2023-11-28 PROCEDURE — 85610 PROTHROMBIN TIME: CPT

## 2023-11-28 PROCEDURE — 73720 MRI LWR EXTREMITY W/O&W/DYE: CPT

## 2023-11-28 PROCEDURE — NC001 PR NO CHARGE: Performed by: INTERNAL MEDICINE

## 2023-11-28 PROCEDURE — 84100 ASSAY OF PHOSPHORUS: CPT

## 2023-11-28 PROCEDURE — 82948 REAGENT STRIP/BLOOD GLUCOSE: CPT

## 2023-11-28 RX ORDER — GADOBUTROL 604.72 MG/ML
10 INJECTION INTRAVENOUS
Status: COMPLETED | OUTPATIENT
Start: 2023-11-28 | End: 2023-11-28

## 2023-11-28 RX ORDER — CEFAZOLIN SODIUM 2 G/50ML
2000 SOLUTION INTRAVENOUS EVERY 8 HOURS
Status: DISCONTINUED | OUTPATIENT
Start: 2023-11-28 | End: 2023-11-30

## 2023-11-28 RX ORDER — CHLORTHALIDONE 25 MG/1
25 TABLET ORAL DAILY
Status: DISCONTINUED | OUTPATIENT
Start: 2023-11-28 | End: 2023-12-01 | Stop reason: HOSPADM

## 2023-11-28 RX ORDER — INSULIN LISPRO 100 [IU]/ML
5 INJECTION, SOLUTION INTRAVENOUS; SUBCUTANEOUS
Status: DISCONTINUED | OUTPATIENT
Start: 2023-11-28 | End: 2023-12-01 | Stop reason: HOSPADM

## 2023-11-28 RX ORDER — HYDRALAZINE HYDROCHLORIDE 20 MG/ML
5 INJECTION INTRAMUSCULAR; INTRAVENOUS EVERY 6 HOURS PRN
Status: DISCONTINUED | OUTPATIENT
Start: 2023-11-28 | End: 2023-12-01 | Stop reason: HOSPADM

## 2023-11-28 RX ADMIN — DILTIAZEM HYDROCHLORIDE 120 MG: 60 TABLET ORAL at 09:00

## 2023-11-28 RX ADMIN — LISINOPRIL 40 MG: 20 TABLET ORAL at 09:00

## 2023-11-28 RX ADMIN — HEPARIN SODIUM 8400 UNITS: 1000 INJECTION INTRAVENOUS; SUBCUTANEOUS at 14:02

## 2023-11-28 RX ADMIN — HEPARIN SODIUM 8400 UNITS: 1000 INJECTION INTRAVENOUS; SUBCUTANEOUS at 00:17

## 2023-11-28 RX ADMIN — HYDRALAZINE HYDROCHLORIDE 5 MG: 20 INJECTION, SOLUTION INTRAMUSCULAR; INTRAVENOUS at 15:26

## 2023-11-28 RX ADMIN — SENNOSIDES 8.6 MG: 8.6 TABLET, FILM COATED ORAL at 09:00

## 2023-11-28 RX ADMIN — METOPROLOL SUCCINATE 100 MG: 100 TABLET, EXTENDED RELEASE ORAL at 09:00

## 2023-11-28 RX ADMIN — INSULIN GLARGINE 30 UNITS: 100 INJECTION, SOLUTION SUBCUTANEOUS at 21:55

## 2023-11-28 RX ADMIN — METOPROLOL SUCCINATE 100 MG: 100 TABLET, EXTENDED RELEASE ORAL at 00:30

## 2023-11-28 RX ADMIN — DILTIAZEM HYDROCHLORIDE 120 MG: 60 TABLET ORAL at 23:00

## 2023-11-28 RX ADMIN — CEFAZOLIN SODIUM 2000 MG: 2 SOLUTION INTRAVENOUS at 15:25

## 2023-11-28 RX ADMIN — INSULIN LISPRO 5 UNITS: 100 INJECTION, SOLUTION INTRAVENOUS; SUBCUTANEOUS at 16:36

## 2023-11-28 RX ADMIN — INSULIN LISPRO 1 UNITS: 100 INJECTION, SOLUTION INTRAVENOUS; SUBCUTANEOUS at 09:00

## 2023-11-28 RX ADMIN — CEFAZOLIN SODIUM 2000 MG: 2 SOLUTION INTRAVENOUS at 09:01

## 2023-11-28 RX ADMIN — DILTIAZEM HYDROCHLORIDE 120 MG: 60 TABLET ORAL at 00:30

## 2023-11-28 RX ADMIN — INSULIN GLARGINE 30 UNITS: 100 INJECTION, SOLUTION SUBCUTANEOUS at 00:37

## 2023-11-28 RX ADMIN — INSULIN LISPRO 3 UNITS: 100 INJECTION, SOLUTION INTRAVENOUS; SUBCUTANEOUS at 11:45

## 2023-11-28 RX ADMIN — ATORVASTATIN CALCIUM 40 MG: 40 TABLET, FILM COATED ORAL at 09:00

## 2023-11-28 RX ADMIN — GADOBUTROL 10 ML: 604.72 INJECTION INTRAVENOUS at 08:13

## 2023-11-28 RX ADMIN — INSULIN LISPRO 3 UNITS: 100 INJECTION, SOLUTION INTRAVENOUS; SUBCUTANEOUS at 16:37

## 2023-11-28 RX ADMIN — METOPROLOL SUCCINATE 100 MG: 100 TABLET, EXTENDED RELEASE ORAL at 21:56

## 2023-11-28 RX ADMIN — CHLORTHALIDONE 25 MG: 25 TABLET ORAL at 16:35

## 2023-11-28 RX ADMIN — HEPARIN SODIUM 22 UNITS/KG/HR: 10000 INJECTION, SOLUTION INTRAVENOUS at 19:01

## 2023-11-28 NOTE — ASSESSMENT & PLAN NOTE
[id number] of 5. Takes Eliquis 5 mg twice daily as outpatient. Last Eliquis dose on a.m. of 11/27  Strictly speaking, patient does not require heparin bridge for procedure, but given his multiple comorbidities will initiate on heparin drip for pre-op. Rate controlled during hospital stay. Patient restarted Eliquis 11/30 in the morning.      Plan:  Continue Eliquis  Continue home diltiazem 120 mg twice daily and metoprolol 100 mg twice daily

## 2023-11-28 NOTE — PROGRESS NOTES
[facility] Podiatry - Progress Note  Patient: Vasyl Desouza. 72 y.o. male   MRN: [id number]  PCP: Loc Chapa MD  Unit/Bed#: CW2 213-01 Encounter: [id number]  Date Of Visit: 11/28/23    ASSESSMENT:    Vasyl Desouza. is a 72 y.o. male with:    Left fourth interdigital space wound  S/p left foot incision and drainage (DOS: 10/27/23)  S/p left foot delayed primary closure (DOS: 10/30/23)  Left dorsal medial second toe wound  Type 2 diabetes mellitus  Essential hypertension  Atrial fibrillation  History of right transmetatarsal amputation with free flap      PLAN:    Patient seen at bedside and dressings on left foot changed. Erythema and edema still noted to lateral and plantar aspects of left foot. Plan to continue local wound care at this time. Patient will need partial fourth ray resection of left foot, pending operating room and surgeon availability tomorrow, 11/29/23. N.p.o. order to start at midnight on 11/29/2023  Discussed with primary team and heparin drip will be held starting at midnight on 11/29/2023 in anticipation of surgery. Final left foot x-ray reviewed: Soft tissue swelling in the forefoot and about the fourth and fifth digits. There is some demineralization in the region of the head of the fourth metatarsal.  Recommend MRI to exclude osteomyelitis. Pending final left foot MRI read. Per my personal read, there is decrease hypointense signal at the fourth metatarsal head and fourth proximal phalanx base on T1 and increased in hyperintense signal on T2 of the fourth metatarsal head and fourth proximal phalanx base indicative of osteomyelitis. Follow-up final aerobic wound culture.   Pulmonary results 1+ poly growth and 1+ gram-positive cocci in pairs  Follow-up final anaerobic wound culture  Follow-up final blood culture  Continue local wound care consisting of daily packing changes and Betadine application to periwound area of the fourth interdigital space wound and Adaptic Betadine DSD to the dorsal medial second toe wound. Elevation on green foam wedges or pillows when non-ambulatory. Rest of care per primary team.    Weight bearing status: Non-weightbearing to left lower extremity      SUBJECTIVE:     The patient was seen, evaluated, and assessed at bedside today. The patient was awake, alert, and in no acute distress. No acute events overnight. The patient reports no pain at this time. Patient denies N/V/F/chills/SOB/CP. OBJECTIVE:     Vitals:   BP (!) 171/98   Pulse 89   Temp 97.9 °F (36.6 °C) (Temporal)   Resp 18   SpO2 97%     Temp (24hrs), Av °F (36.7 °C), Min:97.9 °F (36.6 °C), Max:98 °F (36.7 °C)      Physical Exam:     General:  Alert, cooperative, and in no distress. Lower extremity exam:  Cardiovascular status at baseline. Neurological status at baseline. Musculoskeletal status at baseline. No calf tenderness noted. Lower extremity wound(s) as noted below:  Wound #: 1  Location: Left fourth interdigital space wound dehiscence  Length 8.5 cm: Width 1.0 cm: Depth 1.5 cm:   Deepest Tissue Noted in Base: Caspsule  Probe to Bone: No  Peripheral Skin Description: Attached  Granulation: 10% Fibrotic Tissue: 90% Necrotic Tissue: 0%   Drainage Amount: moderate, serous  Signs of Infection: Yes, ascending erythema, edema, and malodor    Wound #: 2  Location: Dorsal medial left second toe  Length 0.5 cm: Width 0.6 cm: Depth 0.2 cm:   Deepest Tissue Noted in Base: Subcutaneous  Probe to Bone: No  Peripheral Skin Description: Attached  Granulation: 20% Fibrotic Tissue: 80% Necrotic Tissue: 0%   Drainage Amount: minimal, serous  Signs of Infection: Yes, local erythema    Clinical Images 23:                 Additional Data:     Labs:    Results from last 7 days   Lab Units 23  0618   WBC Thousand/uL 8.59   HEMOGLOBIN g/dL 12.9   HEMATOCRIT % 38.8   PLATELETS Thousands/uL 205   NEUTROS PCT % 53   LYMPHS PCT % 34   MONOS PCT % 8   EOS PCT % 4     Results from last 7 days Lab Units 11/28/23  0618   POTASSIUM mmol/L 4.0   CHLORIDE mmol/L 104   CO2 mmol/L 28   BUN mg/dL 15   CREATININE mg/dL 0.84   CALCIUM mg/dL 8.7   ALK PHOS U/L 98   ALT U/L 16   AST U/L 11*     Results from last 7 days   Lab Units 11/28/23  0618   INR  1.32*       * I Have Reviewed All Lab Data Listed Above. Recent Cultures (last 7 days):     Results from last 7 days   Lab Units 11/27/23 2144 11/27/23 2050   BLOOD CULTURE  Received in Microbiology Lab. Culture in Progress. Received in Microbiology Lab. Culture in Progress. --    GRAM STAIN RESULT   --  1+ Polys*  1+ Gram positive cocci in pairs*     Results from last 7 days   Lab Units 11/27/23 2050   ANAEROBIC CULTURE  Culture results to follow. Imaging: I have personally reviewed pertinent films in PACS  EKG, Pathology, and Other Studies: I have personally reviewed pertinent reports. ** Please Note: Portions of the record may have been created with voice recognition software. Occasional wrong word or "sound a like" substitutions may have occurred due to the inherent limitations of voice recognition software. Read the chart carefully and recognize, using context, where substitutions have occurred.  **

## 2023-11-28 NOTE — PROGRESS NOTES
INTERNAL MEDICINE RESIDENCY PROGRESS NOTE     Name: Silvia Dumont. Age & Sex: 72 y.o. male   MRN: [id number]  Unit/Bed#: 2 213-01   Encounter: [id number]  Team: SOD Team C     PATIENT INFORMATION     Name: Silvia Dumont. Age & Sex: 72 y.o. male   MRN: [id number]  Hospital Stay Days: 0    ASSESSMENT/PLAN     Principal Problem:    Puncture wound of left foot with foreign body, subsequent encounter  Active Problems:    Type 2 diabetes mellitus with diabetic neuropathy ([address])    Essential hypertension    S/P transmetatarsal amputation of foot, right (HCC)    Hyperlipidemia    Atrial fibrillation with RVR ([address])      * Puncture wound of left foot with foreign body, subsequent encounter  Assessment & Plan  Patient presenting with left fourth interdigital space wound status post incision and drainage on 10/27/2023, left foot delayed primary closure 10/30/2023, left dorsal medial second toe wound. These are both complications of type 2 diabetes neuropathy. Patient noted to have ascending erythema and localized edema with podiatry. Evaluated by podiatry in the ED who recommended initiation of IV antibiotics with eventual plan for or washout.   Last Eliquis dose of a.m. of 11/27  [id number] of 5 does not require heparin bridge, but will initiate starting 11/27.    -Wound culture gram stain showed +1 gram positive cocci in pairs  -X-ray consistent with possible osteomyelitis as well as soft tissue swelling  -MRI showing definite osteomyelitis involving the fourth metatarsal head and probable early osteomyelitis involving the fourth toe proximal phalanx, plantar soft tissue ulcer at the level of the fourth MTP joint with associated superficial soft tissue gas and probable necrotizing infection, no evidence of a discrete abscess    Plan:  On IV cefazolin 1 g every 8h per podiatry recommendations  Current wound appears to be new/acute within past 3 days, therefore we will not add on metronidazole at this time  Await eventual OR cultures  Podiatry to follow for planned debridement/washout this admission (most likely tomorrow) with possible delayed primary closure, n.p.o. at midnight for anticipation of the OR tomorrow  Follow-up blood cultures    Type 2 diabetes mellitus with diabetic neuropathy [facility])  Assessment & Plan  Lab Results   Component Value Date    HGBA1C 9.8 (H) 10/25/2023       Recent Labs     11/27/23  2319 11/28/23  0627 11/28/23  1046   POCGLU 117 184* 238*       Blood Sugar Average: Last 72 hrs:  (P) [id number]    Need to clarify current home regimen. Per chart review Lantus is listed as both 40 units and 7 units at bedtime. Patient also on lispro 6 units 3 times daily with meals and 500 mg metformin twice daily. He states that his home fasting blood sugars in the morning are between 100-150 while taking 40 units nightly and for this reason, he has not decreased his nightly glargine as previously recommended by his physician. He states that his preprandial Accu-Cheks are 180s to 200s. Most recent HgbA1c is 9.8 in Oct 2023. As noted above this is the likely cause of chronic foot wounds and acute infection this admission.     Plan:  Hold metformin while inpatient  Start glargine on a reduced dose versus home dose at 30 units nightly for a 25% reduction from his preadmission needs  SSI 3 algorithm 3 times daily mealtime insulin with nighttime Accu-Cheks,   Carb controlled diet level 2  NPO at midnight for OR tomorrow   Hypoglycemia protocol  When approaching discharge, can consider up titration of metformin with close follow-up with PCP depending on renal function    Essential hypertension  Assessment & Plan  Blood Pressure: 162/89  Home regimen: Cardizem 120 mg twice daily, lisinopril 40 mg daily, Toprol 100 mg twice daily  On admission, pt hypertensive to 213/103 likely in setting of acute injury and missed BP meds while in ED for several hours    Plan:  Continue home regimen  Add hydralazine 5 mg IV as needed every 6 hours for SBP greater than 160  Continue to monitor vitals and symptoms  Depending on how patient's blood pressure fares over the next few days while inpatient, can consider adding a third standing oral agent    S/P transmetatarsal amputation of foot, right (HCC)  Assessment & Plan  Routine local skin care and pressure offloading while sitting in bed to prevent ulcers    Atrial fibrillation with RVR (HCC)  Assessment & Plan  [id number] of 5. Takes Eliquis 5 mg twice daily as outpatient. Last Eliquis dose on a.m. of   Strictly speaking, patient does not require heparin bridge for procedure, but given his multiple comorbidities will initiate on heparin drip. Plan:  Hold heparin drip and NPO at midnight in anticipation of OR tomorrow  Continue home diltiazem 120 mg twice daily and metoprolol 100 mg twice daily  Transition back to Eliquis when no further plans for surgery per podiatry      Hyperlipidemia  Assessment & Plan  Lab Results   Component Value Date    CHOLESTEROL 140 2018    TRIG 148 2018    HDL 38 (L) 2018    LDLCALC 72 2018     Plan:  Continue home atorvastatin 40 mg p.o. daily  Consider repeating lipids in outpatient setting        Disposition: continue inpatient care for osteomyelitis and possible OR debridement tomorrow     SUBJECTIVE     Patient seen and examined. No acute events overnight. He feels well and does not have any pain at his wound site due to his bilateral neuropathy and loss of sensation up to mid shin. He denies any fevers, chills, nausea, vomiting, diarrhea, or shortness of breath.      OBJECTIVE     Vitals:    23 0029 23 0056 23 1437 23 1541   BP: (!) 171/98  168/91 162/89   BP Location:   Right arm    Pulse: 89      Resp:       Temp:   98 °F (36.7 °C) 97.9 °F (36.6 °C)   TempSrc:   Oral    SpO2: 97% 97%        Temperature:   Temp (24hrs), Av °F (36.7 °C), Min:97.9 °F (36.6 °C), Max:98 °F (36.7 °C)    Temperature: 97.9 °F (36.6 °C)  Intake & Output:  I/O         11/26 0701  11/27 0700 11/27 0701  11/28 0700 11/28 0701 11/29 0700    P. O.  0 220    Total Intake(mL/kg)  0 (0) 220 (2.1)    Urine (mL/kg/hr)  685 300 (0.4)    Total Output  685 300    Net  -685 -80                 Weights: There is no height or weight on file to calculate BMI. Weight (last 2 days)       None          Physical Exam  Constitutional:       General: He is not in acute distress. Appearance: Normal appearance. HENT:      Head: Normocephalic and atraumatic. Right Ear: External ear normal.      Left Ear: External ear normal.      Nose: Nose normal.      Mouth/Throat:      Mouth: Mucous membranes are moist.   Eyes:      Conjunctiva/sclera: Conjunctivae normal.   Cardiovascular:      Rate and Rhythm: Normal rate and regular rhythm. Heart sounds: Normal heart sounds. Pulmonary:      Effort: Pulmonary effort is normal.      Breath sounds: Normal breath sounds. Abdominal:      Palpations: Abdomen is soft. Musculoskeletal:      Right lower leg: No edema. Left lower leg: No edema. Comments: Left lower leg bandaged    Skin:     General: Skin is warm and dry. Findings: Erythema present. Comments: Left lower leg erythematous    Neurological:      General: No focal deficit present. Mental Status: He is alert and oriented to person, place, and time. Psychiatric:         Mood and Affect: Mood normal.         Behavior: Behavior normal.         Thought Content: Thought content normal.       LABORATORY DATA     Labs: I have personally reviewed pertinent reports.   Results from last 7 days   Lab Units 11/28/23  0618 11/28/23  0013 11/27/23  1810   WBC Thousand/uL 8.59  --  9.85   HEMOGLOBIN g/dL 12.9  --  14.3   HEMATOCRIT % 38.8  --  43.4   PLATELETS Thousands/uL 205 198 201   NEUTROS PCT % 53  --  64   MONOS PCT % 8  --  6   EOS PCT % 4  --  3      Results from last 7 days   Lab Units 11/28/23  0618 11/27/23  1810   POTASSIUM mmol/L 4.0 5.0   CHLORIDE mmol/L 104 106   CO2 mmol/L 28 23   BUN mg/dL 15 20   CREATININE mg/dL 0.84 0.95   CALCIUM mg/dL 8.7 9.6   ALK PHOS U/L 98 121*   ALT U/L 16 16   AST U/L 11* 18     Results from last 7 days   Lab Units 11/28/23  0618   MAGNESIUM mg/dL 1.9     Results from last 7 days   Lab Units 11/28/23  0618   PHOSPHORUS mg/dL 3.2      Results from last 7 days   Lab Units 11/28/23  1233 11/28/23  0618 11/28/23  0013   INR   --  1.32* 1.29*   PTT seconds 35 65* 32               IMAGING & DIAGNOSTIC TESTING     Radiology Results: I have personally reviewed pertinent reports. XR foot 3+ views LEFT    Result Date: 11/28/2023  Impression: There is some demineralization in the region of the head of the fourth metatarsal. Recommend MRI to exclude osteomyelitis. This has been scheduled. There is soft tissue swelling. Workstation performed: [id number]     Other Diagnostic Testing: I have personally reviewed pertinent reports.     ACTIVE MEDICATIONS     Current Facility-Administered Medications   Medication Dose Route Frequency    acetaminophen (TYLENOL) tablet 650 mg  650 mg Oral Q6H PRN    aluminum-magnesium hydroxide-simethicone (MAALOX) oral suspension 30 mL  30 mL Oral Q6H PRN    atorvastatin (LIPITOR) tablet 40 mg  40 mg Oral Daily    ceFAZolin (ANCEF) IVPB (premix in dextrose) 2,000 mg 50 mL  2,000 mg Intravenous Q8H    chlorthalidone tablet 25 mg  25 mg Oral Daily    diltiazem (CARDIZEM) tablet 120 mg  120 mg Oral BID    heparin (porcine) 25,000 units in 0.45% NaCl 250 mL infusion (premix)  3-30 Units/kg/hr (Order-Specific) Intravenous Titrated    heparin (porcine) injection 4,200 Units  4,200 Units Intravenous Q6H PRN    heparin (porcine) injection 8,400 Units  8,400 Units Intravenous Q6H PRN    hydrALAZINE (APRESOLINE) injection 5 mg  5 mg Intravenous Q6H PRN    insulin glargine (LANTUS) subcutaneous injection 30 Units 0.3 mL  30 Units Subcutaneous HS    insulin lispro (HumaLOG) 100 units/mL subcutaneous injection 1-5 Units  1-5 Units Subcutaneous HS    insulin lispro (HumaLOG) 100 units/mL subcutaneous injection 1-6 Units  1-6 Units Subcutaneous TID AC    insulin lispro (HumaLOG) 100 units/mL subcutaneous injection 5 Units  5 Units Subcutaneous TID With Meals    lisinopril (ZESTRIL) tablet 40 mg  40 mg Oral Daily    metoprolol succinate (TOPROL-XL) 24 hr tablet 100 mg  100 mg Oral BID    ondansetron (ZOFRAN) injection 4 mg  4 mg Intravenous Q6H PRN    senna (SENOKOT) tablet 8.6 mg  1 tablet Oral Daily    tamsulosin (FLOMAX) capsule 0.4 mg  0.4 mg Oral Daily With Dinner       VTE Pharmacologic Prophylaxis: Reason for no pharmacologic prophylaxis AC dosed heparin  VTE Mechanical Prophylaxis: sequential compression device    Portions of the record may have been created with voice recognition software. Occasional wrong word or "sound a like" substitutions may have occurred due to the inherent limitations of voice recognition software.   Read the chart carefully and recognize, using context, where substitutions have occurred.  ==    Mere Grewal MD, PGY-1  Internal Medicine Residency   [address] [place] [address]

## 2023-11-28 NOTE — ASSESSMENT & PLAN NOTE
Lab Results   Component Value Date    CHOLESTEROL 140 11/08/2018    TRIG 148 11/08/2018    HDL 38 (L) 11/08/2018    LDLCALC 72 11/08/2018     Plan:  Continue home atorvastatin 40 mg p.o. daily  Consider repeating lipids in outpatient setting

## 2023-11-28 NOTE — ED PROVIDER NOTES
History  Chief Complaint   Patient presents with    Wound Check     Wound care nurse came to house and told patient to come to ER for wound check. Patient is a 71 yo male with PMH of diabetes with neuropathy, a fib on eliquis, HTN who presents for evaluation of foot wound. Patient first noted to have diabetic foot ulceration by podiatry on 10/25. Patient underwent I&D, washout, and removal of 2 mm foreign body from left foot. Has been following up with podiatry with frequent home nursing visits for wound care. Wound care nurse was concerned today due to increased redness and swelling. Redressed the wound and contacted patient's podiatrist, Dr. Jamil Jade, who recommended patient go the ED. Patient denying any pain, but states he has significant loss of sensation in BLE's 2/2 neuropathy. Denying chest pain, SOB, nausea, vomiting, fever, chills. Prior to Admission Medications   Prescriptions Last Dose Informant Patient Reported? Taking?    Accu-Chek Guide test strip 11/26/2023  No Yes   Sig: TEST BLOOD SUGARS once a day   Clenpiq 10-3.5-12 MG-GM -GM/160ML SOLN Not Taking Self Yes No   Patient not taking: Reported on 11/27/2023   Eliquis 5 MG 11/27/2023 Self No Yes   Sig: Take 1 tablet (5 mg total) by mouth 2 (two) times a day   Insulin Glargine Solostar (Lantus SoloStar) 100 UNIT/ML SOPN 11/26/2023  No Yes   Sig: Inject 7 units daily   Patient taking differently: 40 Units Inject 7 units daily   Lancets (onetouch ultrasoft) lancets 11/27/2023 Self No Yes   Sig: Use as instructed   Lancets (onetouch ultrasoft) lancets 11/27/2023  No Yes   Sig: Use daily   acetaminophen (Mapap Arthritis Pain) 650 mg CR tablet Past Month Self No Yes   Sig: Take 1 tablet (650 mg total) by mouth 2 (two) times a day   atorvastatin (LIPITOR) 40 mg tablet 11/27/2023 Self No Yes   Sig: Take 1 tablet (40 mg total) by mouth daily   diltiazem (CARDIZEM) 120 MG tablet 11/27/2023  No Yes   Sig: Take 1 tablet (120 mg total) by mouth 2 (two) times a day   glucose blood (OneTouch Ultra) test strip 11/27/2023 Self No Yes   Sig: Use as instructed   glucose blood (OneTouch Verio) test strip 11/27/2023 Self No Yes   Sig: Use 1 each daily Use as instructed   insulin lispro (HumaLOG) 100 units/mL injection pen 11/27/2023  No Yes   Sig: INJECT 6 UNITS UNDER THE SKIN 3 (THREE) TIMES A DAY WITH MEALS   lisinopril (ZESTRIL) 40 mg tablet 11/27/2023 Self No Yes   Sig: Take 1 tablet (40 mg total) by mouth daily   metFORMIN (GLUCOPHAGE) 500 mg tablet 11/27/2023 Self No Yes   Sig: Take 1 tablet (500 mg total) by mouth 2 (two) times a day with meals   metoprolol succinate (TOPROL-XL) 100 mg 24 hr tablet 11/27/2023  No Yes   Sig: Take 1 tablet (100 mg total) by mouth 2 (two) times a day   tamsulosin (FLOMAX) 0.4 mg 11/27/2023  No Yes   Sig: TAKE 1 CAPSULE (0.4 MG TOTAL) BY MOUTH DAILY WITH DINNER      Facility-Administered Medications: None       Past Medical History:   Diagnosis Date    Arthritis     Atrial fibrillation ([address])     Diagnosed 11/2018    Benign prostate hyperplasia 04/2002    Cellulitis     right lower leg     Colon polyp 2006    Diabetes mellitus ([address])     Hearing aid worn     bilat    Hearing loss     90% loss left ear and 40% right ear    History of clubfoot     "since birth"    History of pneumonia     History of TIA (transient ischemic attack) 04/25/2017 11/30/18 pt denies    Hyperlipidemia 5/15/2014    Hypertension     Infectious viral hepatitis     "cant remember type"    Irregular heart beat     Afib    Neuropathy     both feet    Osteomyelitis ([address])     right great toe    Pneumonia     Right middle lobe pulmonary nodule 11/6/2018    Seasickness     Teeth missing     Type 2 diabetes mellitus with diabetic neuropathy ([address]) 11/6/2018    Wears glasses     reading    Wound, open     bottom of right foot       Past Surgical History:   Procedure Laterality Date    BUNIONECTOMY Right 12/4/2018    Procedure: 5TH METATARSAL BONE PARTIAL RESECTION, FULL THICKNESS DEBRIDEMENT OF DIABETIC ULCER;  Surgeon: Clay Mayo DPM;  [facility];  Service: Podiatry    CLOSURE DELAYED PRIMARY Left 10/30/2023    Procedure: CLOSURE DELAYED PRIMARY left foot;  Surgeon: Agueda Siegel DPM;  [facility];  Service: Podiatry    CLUB FOOT RELEASE Bilateral     COLONOSCOPY      COMPLEX WOUND CLOSURE TO EXTREMITY  4/27/2021    Procedure: COMPLEX WOUND CLOSURE TO EXTREMITY: RIGHT FOOT;  Surgeon: Viviane Stern MD;  [facility];  Service: Plastics    EXTERNAL FIXATOR APPLICATION Right 6/83/7713    Procedure: Application multiplane external fixation;  Surgeon: Ricky Osman DPM;  [facility];  Service: Podiatry    FOOT HARDWARE REMOVAL Right 2/17/2021    Procedure: REMOVAL EXTERNAL FIXATOR;  Surgeon: Ricky Osman DPM;  [facility];  Service: Podiatry    FOREIGN BODY REMOVAL Right 4/27/2021    Procedure: REMOVAL FOREIGN BODY EXTREMITY: RIGHT FOOT;  Surgeon: Viviane Stern MD;  [facility];  Service: Plastics    INCISION AND DRAINAGE OF WOUND Right 2/17/2021    Procedure: INCISION AND DRAINAGE (I&D) EXTREMITY;  Surgeon: Ricky Osman DPM;  [facility];  Service: Podiatry    INCISION AND DRAINAGE OF WOUND Left 10/26/2023    Procedure: INCISION AND DRAINAGE (I&D) EXTREMITY; WASHOUT; REMOVAL OF FOREIGN BODY;  Surgeon: Avani López DPM;  [facility];  Service: Podiatry    ORIF FOOT FRACTURE Right 3/4/2021    Procedure: VAC PLACEMENT; SCREW FIXATION RIGHT FOOT FUSION;  Surgeon: Ricky Osman DPM;  [facility];  Service: Podiatry    WI AMPUTATION FOOT TRANSMETARSAL Right 2/22/2021    Procedure: AMPUTATION TRANSMETATARSAL (TMA), REMOVAL NAIL IM T2 ICF HARDWARE;  Surgeon: Ricky Osman DPM;  [facility];  Service: Podiatry    WI ARTHRD MIDTARSL/TARS MLT/TRANSVRS W/OSTEOT Right 2/12/2021    Procedure: foot ARTHRODESIS/FUSION;  Surgeon: Ricky Osman DPM;  [facility];  Service: Podiatry    MA DEBRIDEMENT BONE MUSCLE &/FASCIA 20 SQ CM/< Right 12/2/2021    Procedure: DEBRIDEMENT OF TARSAL BONES WITH ANITBIODIC BEADS;  Surgeon: Eugenio Potter DPM;  [facility];  Service: Podiatry    MA LNGTH/SHRT TENDON LEG/ANKLE 1 TENDON SPX Right 2/12/2021    Procedure: LENGTHEN TIBIAL TENDON, TRANS HEEL CORD;  Surgeon: Eugenio Potter DPM;  [facility];  Service: Podiatry    MA MUSC MYOCUTANEOUS/FASCIOCUTANEOUS FLAP TRUNK Right 4/12/2021    Procedure: RECONSTRUCTION MICROSURGICAL W/ FREE FLAP;  Surgeon: Armond Quick MD;  [facility];  Service: Plastics    MA MUSC MYOCUTANEOUS/FASCIOCUTANEOUS FLAP TRUNK Right 4/12/2021    Procedure: TAKEBACK RECONSTRUCTION MICROSURGICAL W/ FREE FLAP;  Surgeon: Armond Quick MD;  [facility];  Service: Plastics    MA MUSC MYOCUTANEOUS/FASCIOCUTANEOUS FLAP TRUNK Right 4/13/2021    Procedure: RECONSTRUCTION MICROSURGICAL W/ FREE FLAP, RE EXPLORATION, MICRO VASCULAR REVISION;  Surgeon: Armond Quick MD;  [facility];  Service: Plastics    MA OSTC PRTL EXOSTC/CONDYLC METAR HEAD Right 12/29/2020    Procedure: EXCISION EXOSTOSIS;  Surgeon: Eugenio Potter DPM;  [facility];  Service: Podiatry    MA SECONDARY CLOSURE SURG WOUND/DEHSN EXTSV/COMPLIC Right 70/17/4684    Procedure: PRIMARY DELAYED CLOSURE;  Surgeon: Eugenio Potter DPM;  [facility];  Service: Podiatry    TOE AMPUTATION Right     partial great toe    TONSILLECTOMY      VAC DRESSING APPLICATION Right 6/2/0278    Procedure: APPLICATION VAC DRESSING EXTREMITY;  Surgeon: Armond Quick MD;  [facility];  Service: [address] DEBRIDEMENT Right 3/1/2021    Procedure: DEBRIDEMENT LOWER EXTREMITY [facility] OUT);   Surgeon: Armond Quick MD;  [facility];  Service: Plastics    WOUND DEBRIDEMENT Right 4/7/2021    Procedure: DEBRIDEMENT RIGHT FOOT;  Surgeon: Juan R Rehman MD;  [facility];  Service: Plastics    WOUND DEBRIDEMENT Right 2021    Procedure: DEBRIDEMENT LOWER EXTREMITY [facility] OUT); Surgeon: Juan R Rehman MD;  [facility];  Service: Plastics    WOUND DEBRIDEMENT Right 2021    Procedure: DEBRIDEMENT LOWER EXTREMITY [facility] OUT): RIGHT FOOT;  Surgeon: Juan R Rehman MD;  [facility];  Service: Plastics       Family History   Problem Relation Age of Onset    Diabetes Father         mellitus     I have reviewed and agree with the history as documented. E-Cigarette/Vaping    E-Cigarette Use Never User      E-Cigarette/Vaping Substances    Nicotine No     THC No     CBD No     Flavoring No     Other No     Unknown No      Social History     Tobacco Use    Smoking status: Former     Types: Cigarettes     Quit date: 1988     Years since quittin.0    Smokeless tobacco: Former    Tobacco comments:     exposure to passive smoke   Vaping Use    Vaping Use: Never used   Substance Use Topics    Alcohol use: Yes     Comment: occasionally    Drug use: Not Currently        Review of Systems   Skin:  Positive for wound. All other systems reviewed and are negative. Physical Exam  ED Triage Vitals   Temperature Pulse Respirations Blood Pressure SpO2   23 1525 23 1525 23 1525 23 1525 23 1525   97.9 °F (36.6 °C) 86 18 (!) 213/103 98 %      Temp Source Heart Rate Source Patient Position - Orthostatic VS BP Location FiO2 (%)   23 1525 23 1525 23 1800 23 1525 --   Temporal Monitor Lying Right arm       Pain Score       23 1525       No Pain             Orthostatic Vital Signs  Vitals:    23 1525 23 1800   BP: (!) 213/103 (!) 190/100   Pulse: 86 98   Patient Position - Orthostatic VS:  Lying       Physical Exam  Constitutional:       General: He is not in acute distress. Appearance: Normal appearance.  He is not ill-appearing, toxic-appearing or diaphoretic. HENT:      Head: Normocephalic and atraumatic. Cardiovascular:      Rate and Rhythm: Normal rate and regular rhythm. Pulmonary:      Effort: Pulmonary effort is normal.   Musculoskeletal:         General: Normal range of motion. Cervical back: Normal range of motion and neck supple. Skin:     General: Skin is warm and dry. Comments: Wound on plantar surface of left foot between extending distally between 4th and 5th toes. Concern for wound dehiscence. Foul odor of drainage from wound. Mild surrounding erythema (see media for image)    Neurological:      General: No focal deficit present. Mental Status: He is alert.    Psychiatric:         Mood and Affect: Mood normal.         Behavior: Behavior normal.         ED Medications  Medications   heparin (porcine) subcutaneous injection 5,000 Units (has no administration in time range)   senna (SENOKOT) tablet 8.6 mg (has no administration in time range)   acetaminophen (TYLENOL) tablet 650 mg (has no administration in time range)   ondansetron (ZOFRAN) injection 4 mg (has no administration in time range)   aluminum-magnesium hydroxide-simethicone (MAALOX) oral suspension 30 mL (has no administration in time range)   atorvastatin (LIPITOR) tablet 40 mg (has no administration in time range)   diltiazem (CARDIZEM) tablet 120 mg (has no administration in time range)   lisinopril (ZESTRIL) tablet 40 mg (has no administration in time range)   metFORMIN (GLUCOPHAGE) tablet 500 mg (has no administration in time range)   metoprolol succinate (TOPROL-XL) 24 hr tablet 100 mg (has no administration in time range)   tamsulosin (FLOMAX) capsule 0.4 mg (has no administration in time range)   insulin glargine (LANTUS) subcutaneous injection 30 Units 0.3 mL (has no administration in time range)   hydrALAZINE (APRESOLINE) injection 5 mg (has no administration in time range)       Diagnostic Studies  Results Reviewed Procedure Component Value Units Date/Time    Blood culture [[id number]] Collected: 11/27/23 2144    Lab Status: In process Specimen: Blood from Arm, Right Updated: 11/27/23 2151    Blood culture [[id number]] Collected: 11/27/23 2144    Lab Status: In process Specimen: Blood from Arm, Left Updated: 11/27/23 2151    Platelet count [[id number]]     Lab Status: No result Specimen: Blood     Anaerobic culture and Gram stain [[id number]] Collected: 11/27/23 2050    Lab Status: In process Specimen: Wound Updated: 11/27/23 2056    Wound culture and Gram stain [[id number]] Collected: 11/27/23 2050    Lab Status:  In process Specimen: Wound from Foot, Left Updated: 11/27/23 2056    Comprehensive metabolic panel [[id number]]  (Abnormal) Collected: 11/27/23 1810    Lab Status: Final result Specimen: Blood from Hand, Left Updated: 11/27/23 1843     Sodium 136 mmol/L      Potassium 5.0 mmol/L      Chloride 106 mmol/L      CO2 23 mmol/L      ANION GAP 7 mmol/L      BUN 20 mg/dL      Creatinine 0.95 mg/dL      Glucose 211 mg/dL      Calcium 9.6 mg/dL      AST 18 U/L      ALT 16 U/L      Alkaline Phosphatase 121 U/L      Total Protein 8.1 g/dL      Albumin 4.2 g/dL      Total Bilirubin 0.53 mg/dL      eGFR 80 ml/min/1.73sq m     Narrative:      [facility] guidelines for Chronic Kidney Disease (CKD):     Stage 1 with normal or high GFR (GFR > 90 mL/min/1.73 square meters)    Stage 2 Mild CKD (GFR = 60-89 mL/min/1.73 square meters)    Stage 3A Moderate CKD (GFR = 45-59 mL/min/1.73 square meters)    Stage 3B Moderate CKD (GFR = 30-44 mL/min/1.73 square meters)    Stage 4 Severe CKD (GFR = 15-29 mL/min/1.73 square meters)    Stage 5 End Stage CKD (GFR <15 mL/min/1.73 square meters)  Note: GFR calculation is accurate only with a steady state creatinine    C-reactive protein [[id number]]  (Abnormal) Collected: 11/27/23 1810    Lab Status: Final result Specimen: Blood from Hand, Left Updated: 11/27/23 1843     CRP 33.0 mg/L Sedimentation rate, automated [[id number]]  (Abnormal) Collected: 11/27/23 1810    Lab Status: Final result Specimen: Blood from Hand, Left Updated: 11/27/23 1835     Sed Rate 70 mm/hour     CBC and differential [[id number]] Collected: 11/27/23 1810    Lab Status: Final result Specimen: Blood from Hand, Left Updated: 11/27/23 1820     WBC 9.85 Thousand/uL      RBC 4.53 Million/uL      Hemoglobin 14.3 g/dL      Hematocrit 43.4 %      MCV 96 fL      MCH 31.6 pg      MCHC 32.9 g/dL      RDW 14.1 %      MPV 10.6 fL      Platelets 370 Thousands/uL      nRBC 0 /100 WBCs      Neutrophils Relative 64 %      Immat GRANS % 1 %      Lymphocytes Relative 25 %      Monocytes Relative 6 %      Eosinophils Relative 3 %      Basophils Relative 1 %      Neutrophils Absolute 6.36 Thousands/µL      Immature Grans Absolute 0.05 Thousand/uL      Lymphocytes Absolute 2.48 Thousands/µL      Monocytes Absolute 0.61 Thousand/µL      Eosinophils Absolute 0.30 Thousand/µL      Basophils Absolute 0.05 Thousands/µL                    XR foot 3+ views LEFT    (Results Pending)   MRI inpatient order    (Results Pending)         Procedures  Procedures      ED Course                             SBIRT 22yo+      Flowsheet Row Most Recent Value   Initial Alcohol Screen: US AUDIT-C     1. How often do you have a drink containing alcohol? 0 Filed at: 11/27/2023 1756   2. How many drinks containing alcohol do you have on a typical day you are drinking? 0 Filed at: 11/27/2023 1756   3b. FEMALE Any Age, or MALE 65+: How often do you have 4 or more drinks on one occassion? 0 Filed at: 11/27/2023 1756   Audit-C Score 0 Filed at: 11/27/2023 1756   TERRI: How many times in the past year have you. .. Used an illegal drug or used a prescription medication for non-medical reasons?  Never Filed at: 11/27/2023 [address] [place] [address] Dr Salvador Hyatt. is a 72 y.o. who presents for evaluation of diabetic left foot wound    Vital signs are hypertensive, otherwise stable, afebrile  PE: see media for image of wound     Ddx: wound dehiscence  Concern for local wound infection  Will evaluate for osteomyelitis  No concern for sepsis, patient not meeting any SIRS criteria    Plan: ESR, CRP elevated   X ray left foot- no acute osseous abnormalities  CBC, CMP WNL  Podiatry consulted    Reassessment: wound cultures obtained by podiatry pending. Patient remained hemodynamically stable, afebrile. Denying any pain. Disposition: admitted to [facility], podiatry will continue to follow. Patient understands and is agreeable to plan. Amount and/or Complexity of Data Reviewed  Labs: ordered. Risk  Decision regarding hospitalization. Disposition  Final diagnoses:   Puncture wound of left foot with foreign body, subsequent encounter     Time reflects when diagnosis was documented in both MDM as applicable and the Disposition within this note       Time User Action Codes Description Comment    11/27/2023  5:50 PM [facility], [facility] [address]   [address] Puncture wound of left foot with foreign body, subsequent encounter           ED Disposition       ED Disposition   Admit    Condition   Stable    Date/Time   Mon Nov 27, 2023 2128    Comment   Case was discussed with Dr. Sav De and the patient's admission status was agreed to be Admission Status: observation status to the service of Dr. Donetta Severs . Follow-up Information    None         Patient's Medications   Discharge Prescriptions    No medications on file     No discharge procedures on file. PDMP Review       None             ED Provider  Attending physically available and evaluated Castro Sonia . I managed the patient along with the ED Attending.     Electronically Signed by           Kyle Varela MD  11/27/23 5154

## 2023-11-28 NOTE — ASSESSMENT & PLAN NOTE
Blood Pressure: 113/68  Home regimen: Cardizem 120 mg twice daily, lisinopril 40 mg daily, Toprol 100 mg twice daily    On admission, pt hypertensive to 213/103 likely in setting of acute injury and missed BP meds while in ED for several hours    Today patient has blood pressures in the 110s/60s. Improved from 130-140/70-90 yesterday. Patient's pressures seem to be more controlled with addition of chlorthalidone 25 mg. Initial hypertension may in part have been due to anxiety related to upcoming surgery.     Plan:  Continue home regimen  Continue chlorthalidone 25 mg daily on discharge  Monitor home Bps  PCP follow-up for further adjustments to regimen

## 2023-11-28 NOTE — UTILIZATION REVIEW
Initial Clinical Review    Observation 11/27 @ 2109 and changed to Inpatient on 11/28 @ 1426. Pt requiring continued stay for continues Iv antibiotics, Iv Heparin, MRI pending and ongoing treatment    Admission: Date/Time/Statement:   Admission Orders (From admission, onward)       Ordered        11/28/23 1426  Inpatient Admission  Once            11/27/23 2109  Place in Observation  Once                          Orders Placed This Encounter   Procedures    Inpatient Admission     Standing Status:   Standing     Number of Occurrences:   1     Order Specific Question:   Level of Care     Answer:   Med Surg [16]     Order Specific Question:   Estimated length of stay     Answer:   More than 2 Midnights     Order Specific Question:   Certification     Answer:   I certify that inpatient services are medically necessary for this patient for a duration of greater than two midnights. See H&P and MD Progress Notes for additional information about the patient's course of treatment. ED Arrival Information       Expected   -    Arrival   11/27/2023 15:15    Acuity   Urgent              Means of arrival   Walk-In    Escorted by   Self    Service   [facility]    Admission type   Emergency              Arrival complaint   foot injury             Chief Complaint   Patient presents with    Wound Check     Wound care nurse came to house and told patient to come to ER for wound check. Initial Presentation: 72 y.o. male to ED presents for Injury of left foot per Podiatry recommendation. Pt noted with diabetic foot ulcer by Podiatry on 10/25 with I&D, washout, removal of 2 mg millimeter foreign body from left foot, supposedly glass. He has since been discharged home with frequent wound care nursing visits every 3 to 4 days. He also has regular visits with his podiatrist, Dr. Gertrude De Oliveira. Patient was noted by wound care nursing 3 days prior to admission to have new wound on bottom of left foot.  He is unaware of how he may have injured his foot as he has been following nonweightbearing restrictions very well and has been wearing his orthotic shoe along with complying with crutches strictly. Last took Eliquis this am.  PMH for G7TK, complicated by neuropathy for past 10 years complicated by right TMA, HTN, BPH, A-fib. Admit Observation level of care for Puncture wound of left foot with foreign body. Left dorsal medial second toe wound. Left fourth interdigital space wound status post incision and drainage on 10/27/2023, left foot delayed primary closure 10/30/2023. Started on Iv antibiotics. Wound cultures. Foot xray pending. MRI of foot pending/ordered to plan for intervention with Podiatry. Bld cultures to r/o seeding/ bacteremia. 11/27  Podiatry cons; Left fourth interdigital space wound. S/p left foot incision and drainage on 10/27/23), S/p left foot delayed primary closure on 10/20/23. Left fourth interdigital space wound dehiscence with localized edema and plantar ascending erythema s/p 1 month from incision and drainage and delayed primary closure of left foot resulting from abscess formation due to foreign body puncture wound. Recommend Iv antibiotics with wound debridement and washout in the operating room and possible delayed primary closure. No concrete OR plans at this time. Plan to continue local wound care while surgical planning proceeds. F/u wound cultures and MRI of left foot. F/u final left foot xray read. 11/28 Changed to Inpatient status  Progress notes; -Wound culture gram stain showed +1 gram positive cocci in pairs. X-ray consistent with possible osteomyelitis as well as soft tissue swelling. Continue Iv antibiotics. F/u Bld cultures. NPO at MN in anticipation of OR tomorrow 11/29. Pt hypertensive to 213/102 on admit, missed her BP meds. Continue home regimen. Add hydralazine 5 mg IV as needed every 6 hours for SBP greater than 160. Continue to monitor vitals and symptoms.    MRI showing definite osteomyelitis involving the fourth metatarsal head and probable early osteomyelitis involving the fourth toe proximal phalanx, plantar soft tissue ulcer at the level of the fourth MTP joint with associated superficial soft tissue gas and probable necrotizing infection, no evidence of a discrete abscess     Per Podiatry; Continue with local wound care, partial fourth ray resection will be necessary pending OR and surgeon availability. NPO after MN. Heparin to be held. MRI  showing hypointense signal at the fourth metatarsal head and fourth proximal phalanx on T1 and increase in hypertensive male on T2 of the fourth metatarsal head and fourth proximal phalanx indicating osteomyelitis, radiologist interpretation is pending. 11/29  Per Podiatry; Plan for OR today, for left partial fourth ray resection      Date: 11/29    Day 3: Has surpassed a 2nd midnight with active treatments and services, which include continued on IV antibiotics and Iv Heparin drip. F/u cultures. Possible OR plans.       ED Triage Vitals   Temperature Pulse Respirations Blood Pressure SpO2   11/27/23 1525 11/27/23 1525 11/27/23 1525 11/27/23 1525 11/27/23 1525   97.9 °F (36.6 °C) 86 18 (!) 213/103 98 %      Temp Source Heart Rate Source Patient Position - Orthostatic VS BP Location FiO2 (%)   11/27/23 1525 11/27/23 1525 11/27/23 1800 11/27/23 1525 --   Temporal Monitor Lying Right arm       Pain Score       11/27/23 1525       No Pain          Wt Readings from Last 1 Encounters:   11/28/23 104 kg (230 lb)     Additional Vital Signs:   11/28/23 00:29:49 -- 89 -- 171/98 Abnormal  122 97 % -- --   11/27/23 23:21:49 -- 62 -- 171/95 Abnormal  120 98 % -- --   11/27/23 2248 -- 88 18 145/82 -- 97 % None (Room air) --   11/27/23 1800 -- 98 18 190/100 Abnormal  -- 96 % None (Room air)      Pertinent Labs/Diagnostic Test Results:   XR foot 3+ views LEFT   Final Result by Prudence Doty MD (11/28 5495)      There is some demineralization in the region of the head of the fourth metatarsal. Recommend MRI to exclude osteomyelitis. This has been scheduled. There is soft tissue swelling. Workstation performed: [id number]         MRI foot/forefoot toes left w wo contrast (11/28) -  Findings consistent with definite osteomyelitis involving the fourth metatarsal head and probable early osteomyelitis involving the fourth toe proximal phalanx. Plantar soft tissue ulcer at the level of the fourth MTP joint with associated superficial soft tissue gas and probable necrotizing infection. No evidence of a discrete abscess.          Results from last 7 days   Lab Units 11/28/23  0618 11/28/23  0013 11/27/23  1810   WBC Thousand/uL 8.59  --  9.85   HEMOGLOBIN g/dL 12.9  --  14.3   HEMATOCRIT % 38.8  --  43.4   PLATELETS Thousands/uL 205 198 201   NEUTROS ABS Thousands/µL 4.57  --  6.36         Results from last 7 days   Lab Units 11/28/23  0618 11/27/23  1810   SODIUM mmol/L 139 136   POTASSIUM mmol/L 4.0 5.0   CHLORIDE mmol/L 104 106   CO2 mmol/L 28 23   ANION GAP mmol/L 7 7   BUN mg/dL 15 20   CREATININE mg/dL 0.84 0.95   EGFR ml/min/1.73sq m 88 80   CALCIUM mg/dL 8.7 9.6   MAGNESIUM mg/dL 1.9  --    PHOSPHORUS mg/dL 3.2  --      Results from last 7 days   Lab Units 11/28/23  0618 11/27/23  1810   AST U/L 11* 18   ALT U/L 16 16   ALK PHOS U/L 98 121*   TOTAL PROTEIN g/dL 6.7 8.1   ALBUMIN g/dL 3.5 4.2   TOTAL BILIRUBIN mg/dL 0.56 0.53     Results from last 7 days   Lab Units 11/28/23  1046 11/28/23  0627 11/27/23  2319   POC GLUCOSE mg/dl 238* 184* 117     Results from last 7 days   Lab Units 11/28/23  0618 11/27/23  1810   GLUCOSE RANDOM mg/dL 195* 211*       Results from last 7 days   Lab Units 11/28/23  1233 11/28/23  0618 11/28/23  0013   PROTIME seconds  --  16.2* 16.0*   INR   --  1.32* 1.29*   PTT seconds 35 65* 32       Results from last 7 days   Lab Units 11/27/23  1810   CRP mg/L 33.0*   SED RATE mm/hour 70*       Results from last 7 days   Lab Units 11/27/23 2144 11/27/23 2050   BLOOD CULTURE  Received in Microbiology Lab. Culture in Progress. Received in Microbiology Lab. Culture in Progress.   --    GRAM STAIN RESULT   --  1+ Polys*  1+ Gram positive cocci in pairs*   WOUND CULTURE   --  Culture too young- will reincubate     ED Treatment:   Medication Administration from 11/27/2023 1515 to 11/27/2023 2257       None          Past Medical History:   Diagnosis Date    Arthritis     Atrial fibrillation ([address])     Diagnosed 11/2018    Benign prostate hyperplasia 04/2002    Cellulitis     right lower leg     Colon polyp 2006    Diabetes mellitus ([address])     Hearing aid worn     bilat    Hearing loss     90% loss left ear and 40% right ear    History of clubfoot     "since birth"    History of pneumonia     History of TIA (transient ischemic attack) 04/25/2017 11/30/18 pt denies    Hyperlipidemia 5/15/2014    Hypertension     Infectious viral hepatitis     "cant remember type"    Irregular heart beat     Afib    Neuropathy     both feet    Osteomyelitis ([address])     right great toe    Pneumonia     Right middle lobe pulmonary nodule 11/6/2018    Seasickness     Teeth missing     Type 2 diabetes mellitus with diabetic neuropathy ([address]) 11/6/2018    Wears glasses     reading    Wound, open     bottom of right foot     Present on Admission:   Hyperlipidemia   Type 2 diabetes mellitus with diabetic neuropathy ([address])   Essential hypertension   Atrial fibrillation with RVR ([facility])      Admitting Diagnosis: Foot injury [S99.929A]  Puncture wound of left foot with foreign body, subsequent encounter [S91.342D]  Age/Sex: 72 y.o. male    Admission Orders:  Scheduled Medications:  atorvastatin, 40 mg, Oral, Daily  cefazolin, 2,000 mg, Intravenous, Q8H  diltiazem, 120 mg, Oral, BID  insulin glargine, 30 Units, Subcutaneous, HS  insulin lispro, 1-5 Units, Subcutaneous, HS  insulin lispro, 1-6 Units, Subcutaneous, TID AC  insulin lispro, 5 Units, Subcutaneous, TID With Meals  lisinopril, 40 mg, Oral, Daily  metoprolol succinate, 100 mg, Oral, BID  senna, 1 tablet, Oral, Daily  tamsulosin, 0.4 mg, Oral, Daily With Dinner      Continuous IV Infusions:  heparin (porcine), 3-30 Units/kg/hr (Order-Specific), Intravenous, Titrated      PRN Meds:  acetaminophen, 650 mg, Oral, Q6H PRN  aluminum-magnesium hydroxide-simethicone, 30 mL, Oral, Q6H PRN  heparin (porcine), 4,200 Units, Intravenous, Q6H PRN  heparin (porcine), 8,400 Units, Intravenous, Q6H PRN  hydrALAZINE, 5 mg, Intravenous, Q6H PRN  ondansetron, 4 mg, Intravenous, Q6H PRN        IP CONSULT TO PODIATRY    Network Utilization Review Department  ATTENTION: Please call with any questions or concerns to [phone] and carefully listen to the prompts so that you are directed to the right person. All voicemails are confidential.   For Discharge needs, contact Care Management DC Support Team at [phone] opt. 2  Send all requests for admission clinical reviews, approved or denied determinations and any other requests to dedicated fax number below belonging to the campus where the patient is receiving treatment.  List of dedicated fax numbers for the Facilities:  [place] DENIALS (Administrative/Medical Necessity) [phone]   DISCHARGE SUPPORT TEAM (NETWORK) [id number] [facility] (Maternity/NICU/Pediatrics) [phone]   [address] [place] [address] Drive [address] [facility] [phone]   [facility] [address] [place] [address] Road [address] [address] [facility] [address] [facility] [address] [place] [phone]

## 2023-11-28 NOTE — CONSULTS
Podiatry - Consultation    Patient Information:   Олег Sims. 72 y.o. male MRN: [id number]  Unit/Bed#: [facility] Encounter: [id number]  PCP: Gypsy Vargas MD  Date of Admission:  11/27/2023  Date of Consultation: 11/27/23  Requesting Physician: Ton Shaikh DO      ASSESSMENT:    Олег Sims. is a 72 y.o. male with:    Left fourth interdigital space wound  S/p left foot incision and drainage (DOS: 10/27/23)  S/p left foot delayed primary closure (DOS: 10/30/23)  Left dorsal medial second toe wound  Type 2 diabetes mellitus  Essential hypertension  Atrial fibrillation  History of right transmetatarsal amputation with free flap    PLAN:    Patient was seen and evaluated at bedside. Left fourth interdigital space wound dehiscence with localized edema and plantar ascending erythema s/p 1 month from incision and drainage and delayed primary closure of left foot resulting from abscess formation due to foreign body puncture wound. Recommend admission to hospital for IV antibiotics with wound debridement and washout in the operating room and possible delayed primary closure. No concrete OR plans at this time. Plan to continue local wound care while surgical planning proceeds. Left dorsal medial second toe wound with mild localized erythema and edema, no probe to bone. Plan to continue local wound care at this time. Follow-up final left foot x-ray read. Per my personal read, there is no soft tissue emphysema or acute osseous abnormality noted. Small amount of cortical erosion and lucency noted to lateral fourth metatarsal head, inconclusive for possible osteomyelitis. Follow-up wound cultures  Follow MRI of left foot  Local wound care consisting of betadine soaked packing, DSD. Podiatry to cover dressing changes at this time. Elevation on green foam wedges or pillows when non-ambulatory  Rest of care per primary team.  Will discuss this plan with my attending and update as needed.     Weightbearing status: Nonweightbearing to left lower extremity    SUBJECTIVE:    History of Present Illness:    Lluvia Fernandez is a 72 y.o. male who is originally admitted 11/27/2023 due to left diabetic foot infection and wound dehiscence. Patient has a past medical history of type 2 diabetes, essential hypertension, atrial fibrillation, cellulitis of the left lower extremity due to puncture wound. We are consulted for left foot wound dehiscence and cellulitis. Patient reports that a visiting home nurse arrived at his home today for his routine wound check for an incision on his left foot as a result of a puncture wound with a piece of glass that caused an abscess to form. Patient was recently admitted to [facility] where he had to surgeries, a left foot incision and drainage and a delayed primary closure. Patient reports that he has been walking on his foot as little as possible and using the crutches and toe offloading shoe that he was given while in the hospital.  Patient states that when he is at home he strictly uses his wheelchair to keep pressure off of his foot. Patient has a right transmetatarsal amputation and is concerned that the same may happen to his left foot. Patient's wife was on the phone during entire visit. Patient denies any fever, nausea, vomiting, chills, shortness of breath at this time. Patient reports that he has not taken any of his blood pressure medication today. Patient states that he recently had an episode of A-fib that resulted in his last hospital admission. Patient reports that his blood pressure medication and medication for his A-fib have recently been changed. Review of Systems:    Constitutional: Negative. HENT: Negative. Eyes: Negative. Respiratory: Negative. Cardiovascular: Negative. Gastrointestinal: Negative.     Musculoskeletal: Negative  Skin: Left fourth interdigital space wound and dorsal medial second toe wound  Neurological: Numbness and tingling  Psych: Negative.      Past Medical and Surgical History:     Past Medical History:   Diagnosis Date    Arthritis     Atrial fibrillation ([address])     Diagnosed 11/2018    Benign prostate hyperplasia 04/2002    Cellulitis     right lower leg     Colon polyp 2006    Diabetes mellitus ([address])     Hearing aid worn     bilat    Hearing loss     90% loss left ear and 40% right ear    History of clubfoot     "since birth"    History of pneumonia     History of TIA (transient ischemic attack) 04/25/2017 11/30/18 pt denies    Hyperlipidemia 5/15/2014    Hypertension     Infectious viral hepatitis     "cant remember type"    Irregular heart beat     Afib    Neuropathy     both feet    Osteomyelitis ([address])     right great toe    Pneumonia     Right middle lobe pulmonary nodule 11/6/2018    Seasickness     Teeth missing     Type 2 diabetes mellitus with diabetic neuropathy ([address]) 11/6/2018    Wears glasses     reading    Wound, open     bottom of right foot       Past Surgical History:   Procedure Laterality Date    BUNIONECTOMY Right 12/4/2018    Procedure: 5TH METATARSAL BONE PARTIAL RESECTION, FULL THICKNESS DEBRIDEMENT OF DIABETIC ULCER;  Surgeon: Jair Blair DPM;  [facility];  Service: Podiatry    CLOSURE DELAYED PRIMARY Left 10/30/2023    Procedure: CLOSURE DELAYED PRIMARY left foot;  Surgeon: Cathy Mai DPM;  [facility];  Service: Podiatry    CLUB FOOT RELEASE Bilateral     COLONOSCOPY      COMPLEX WOUND CLOSURE TO EXTREMITY  4/27/2021    Procedure: COMPLEX WOUND CLOSURE TO EXTREMITY: RIGHT FOOT;  Surgeon: Dameon Benito MD;  [facility];  Service: Plastics    EXTERNAL FIXATOR APPLICATION Right 7/97/7680    Procedure: Application multiplane external fixation;  Surgeon: Karthik Torres DPM;  [facility];  Service: Podiatry    FOOT HARDWARE REMOVAL Right 2/17/2021    Procedure: REMOVAL EXTERNAL FIXATOR;  Surgeon: Karthik Torres DPM;  [facility];  Service: Podiatry    FOREIGN BODY REMOVAL Right 4/27/2021    Procedure: REMOVAL FOREIGN BODY EXTREMITY: RIGHT FOOT;  Surgeon: Niles Faustin MD;  [facility];  Service: Plastics    INCISION AND DRAINAGE OF WOUND Right 2/17/2021    Procedure: INCISION AND DRAINAGE (I&D) EXTREMITY;  Surgeon: Brooke Castellanos DPM;  [facility];  Service: Podiatry    INCISION AND DRAINAGE OF WOUND Left 10/26/2023    Procedure: INCISION AND DRAINAGE (I&D) EXTREMITY; WASHOUT; REMOVAL OF FOREIGN BODY;  Surgeon: Radha Antoine DPM;  [facility];  Service: Podiatry    ORIF FOOT FRACTURE Right 3/4/2021    Procedure: VAC PLACEMENT; SCREW FIXATION RIGHT FOOT FUSION;  Surgeon: Brooke Castellanos DPM;  [facility];  Service: Podiatry    CT AMPUTATION FOOT TRANSMETARSAL Right 2/22/2021    Procedure: AMPUTATION TRANSMETATARSAL (TMA), REMOVAL NAIL IM T2 ICF HARDWARE;  Surgeon: Brooke Castellanos DPM;  [facility];  Service: Podiatry    CT ARTHRD MIDTARSL/TARS MLT/TRANSVRS W/OSTEOT Right 2/12/2021    Procedure: foot ARTHRODESIS/FUSION;  Surgeon: Brooke Castellanos DPM;  [facility];  Service: Podiatry     [address] &/FASCIA 20 SQ CM/< Right 12/2/2021    Procedure: DEBRIDEMENT OF TARSAL BONES WITH [place] Crew;  Surgeon: Brooke Castellanos DPM;  [facility];  Service: Podiatry    CT LNGTH/SHRT TENDON LEG/ANKLE 1 TENDON SPX Right 2/12/2021    Procedure: LENGTHEN TIBIAL TENDON, TRANS HEEL CORD;  Surgeon: Brooke Castellanos DPM;  [facility];  Service: Podiatry    CT MUSC MYOCUTANEOUS/FASCIOCUTANEOUS FLAP TRUNK Right 4/12/2021    Procedure: RECONSTRUCTION MICROSURGICAL W/ FREE FLAP;  Surgeon: Niles Faustin MD;  [facility];  Service: Plastics    CT MUSC MYOCUTANEOUS/FASCIOCUTANEOUS FLAP TRUNK Right 4/12/2021    Procedure: TAKEBACK RECONSTRUCTION MICROSURGICAL W/ FREE FLAP;  Surgeon: Niles Faustin MD;  [facility]; Service: Plastics    MA MUSC MYOCUTANEOUS/FASCIOCUTANEOUS FLAP TRUNK Right 4/13/2021    Procedure: RECONSTRUCTION MICROSURGICAL W/ FREE FLAP, RE EXPLORATION, MICRO VASCULAR REVISION;  Surgeon: Jonathan Galeano MD;  [facility];  Service: Plastics    MA OSTC PRTL EXOSTC/CONDYLC METAR HEAD Right 12/29/2020    Procedure: EXCISION EXOSTOSIS;  Surgeon: Jason Caballero DPM;  [facility];  Service: Podiatry    MA SECONDARY CLOSURE SURG WOUND/DEHSN EXTSV/COMPLIC Right 38/61/2252    Procedure: PRIMARY DELAYED CLOSURE;  Surgeon: Jason Caballero DPM;  [facility];  Service: Podiatry    TOE AMPUTATION Right     partial great toe    TONSILLECTOMY      VAC DRESSING APPLICATION Right 4/1/0130    Procedure: APPLICATION VAC DRESSING EXTREMITY;  Surgeon: Jonathan Galeano MD;  [facility];  Service: [address] DEBRIDEMENT Right 3/1/2021    Procedure: DEBRIDEMENT LOWER EXTREMITY [facility] OUT); Surgeon: Jonathan Galeano MD;  [facility];  Service: Plastics    WOUND DEBRIDEMENT Right 4/7/2021    Procedure: DEBRIDEMENT RIGHT FOOT;  Surgeon: Jonathan Galeano MD;  [facility];  Service: Plastics    WOUND DEBRIDEMENT Right 4/12/2021    Procedure: DEBRIDEMENT LOWER EXTREMITY [facility] OUT); Surgeon: Jonathan Galeano MD;  [facility];  Service: Plastics    WOUND DEBRIDEMENT Right 4/27/2021    Procedure: DEBRIDEMENT LOWER EXTREMITY [facility] OUT): RIGHT FOOT;  Surgeon: Jonathan Galeano MD;  [facility];  Service: Plastics       Meds/Allergies:    (Not in a hospital admission)      Allergies   Allergen Reactions    Simvastatin Myalgia    Itraconazole Other (See Comments)     Pt denies knowledge of allergy. .        Social History:     Marital Status: /Civil Union    Substance Use History:   Social History     Substance and Sexual Activity   Alcohol Use Yes    Comment: occasionally     Social History Tobacco Use   Smoking Status Former    Types: Cigarettes    Quit date: 1988    Years since quittin.0   Smokeless Tobacco Former   Tobacco Comments    exposure to passive smoke     Social History     Substance and Sexual Activity   Drug Use Not Currently       Family History:    Family History   Problem Relation Age of Onset    Diabetes Father         mellitus         OBJECTIVE:    Vitals:   Blood Pressure: (!) 190/100 (23 1800)  Pulse: 98 (23 1800)  Temperature: 97.9 °F (36.6 °C) (23 1525)  Temp Source: Temporal (23 1525)  Respirations: 18 (23 1800)  SpO2: 96 % (23 1800)    Physical Exam:    General Appearance: Alert, cooperative, no distress. HEENT: Head normocephalic, atraumatic, without obvious abnormality. Heart: Normal rate and rhythm. Lungs: Non-labored breathing. No respiratory distress. Abdomen: Without distension. Psychiatric: AAOx3  Lower Extremity:  Vascular:   Right DP and PT pulses are weak on palpation and biphasic on Doppler. Left DP and PT pulses are weak on palpation and biphasic on Doppler. CRT < 3 seconds at the digits. +0/4 edema noted at bilateral lower extremities. Pedal hair is absent. Skin temperature is warm to the plantar and lateral aspects of the left foot. Musculoskeletal:  MMT is 4/5 in all muscle compartments bilaterally. ROM at the 1st MPJ and ankle joint are reduced bilaterally with the leg extended. No Pain on palpation of left dorsal medial second toe wound and left fourth anterior digital space wound. S/p right transmetatarsal amputation with dorsal free flap.      Dermatological:  Lower extremity wound(s) as noted below:    Wound #: 1  Location: Left fourth interdigital space wound dehiscence  Length 8.5 cm: Width 1.0 cm: Depth 1.5 cm:   Deepest Tissue Noted in Base: Capsule  Probe to Bone: No  Peripheral Skin Description: Attached  Granulation: 10% Fibrotic Tissue: 90% Necrotic Tissue: 0%   Drainage Amount: moderate, serosanguinous  Signs of Infection: Yes, plantar ascending erythema edema, malodor     Wound #: 2  Location: Dorsal medial second toe  Length 0.5 cm: Width 0.6 cm: Depth 0.2 cm:   Deepest Tissue Noted in Base: subcutaneous  Probe to Bone: No  Peripheral Skin Description: Attached  Granulation: 20% Fibrotic Tissue: 80% Necrotic Tissue: 0%   Drainage Amount: minimal, serous  Signs of Infection: Yes, localized erythema and edema    Neurological:  Gross sensation is diminished. Protective sensation is absent. Patient Reports numbness and/or paresthesias. Clinical Images 11/27/23: Additional data:     Lab Results: I have personally reviewed pertinent labs including:    Results from last 7 days   Lab Units 11/27/23  1810   WBC Thousand/uL 9.85   HEMOGLOBIN g/dL 14.3   HEMATOCRIT % 43.4   PLATELETS Thousands/uL 201   NEUTROS PCT % 64   LYMPHS PCT % 25   MONOS PCT % 6   EOS PCT % 3     Results from last 7 days   Lab Units 11/27/23  1810   POTASSIUM mmol/L 5.0   CHLORIDE mmol/L 106   CO2 mmol/L 23   BUN mg/dL 20   CREATININE mg/dL 0.95   CALCIUM mg/dL 9.6   ALK PHOS U/L 121*   ALT U/L 16   AST U/L 18           Cultures: I have personally reviewed pertinent cultures including:              Imaging: I have personally reviewed pertinent reports in PACS. EKG, Pathology, and Other Studies: I have personally reviewed pertinent reports. ** Please Note: Portions of the record may have been created with voice recognition software. Occasional wrong word or "sound a like" substitutions may have occurred due to the inherent limitations of voice recognition software. Read the chart carefully and recognize, using context, where substitutions have occurred.  ** : Yes

## 2023-11-28 NOTE — H&P
INTERNAL MEDICINE RESIDENCY ADMISSION H&P     Name: Jaycee Menchaca. Age & Sex: 72 y.o. male   MRN: [id number]  Unit/Bed#: 2 213-01   Encounter: [id number]  Primary Care Provider: Ricardo Zhu MD    Code Status: Level 1 - Full Code  Admission Status: OBSERVATION  Disposition: Patient requires Med/Surg    Admit to team: SOD Team C     ASSESSMENT/PLAN     Principal Problem:    Puncture wound of left foot with foreign body, subsequent encounter  Active Problems:    Hyperlipidemia    Type 2 diabetes mellitus with diabetic neuropathy ([address])    Atrial fibrillation with RVR ([address])    Essential hypertension    S/P transmetatarsal amputation of foot, right ([address])      * Puncture wound of left foot with foreign body, subsequent encounter  Assessment & Plan  Left fourth interdigital space wound status post incision and drainage on 10/27/2023, left foot delayed primary closure 10/30/2023  Left dorsal medial second toe wound  These are both complications of type 2 diabetes neuropathy  Patient noted to have ascending erythema and localized edema today with podiatry. Last Eliquis dose of a.m. of 11/27  [id number] of 5 does not require heparin bridge, but will initiate and defer discontinuation to the day team as they see fit    Plan:  Initiated patient on scheduled cefazolin 1 g every 8h for cellulitis.   Current wound appears to be new/acute within past 3 days, therefore we will not add on metronidazole at this time  Follow-up admission wound culture; suspect polymicrobial; await eventual OR cultures  Foot x-ray read pending, but no obvious signs of osteomyelitis  MRI of foot pending/ordered to plan for intervention with podiatry  Podiatry to follow for planned debridement/washout this admission with possible delayed primary closure  Continue antibiotics until osteomyelitis is ruled out on MRI vs. OR culture  Ordered blood cultures to rule out seeding/bacteremia    S/P transmetatarsal amputation of foot, right [facility])  Assessment & Plan  Routine local skin care and pressure offloading while sitting in bed to prevent ulcers    Essential hypertension  Assessment & Plan  Blood Pressure: (!) 171/95  On admission, pt hypertensive to 213/103 likely in setting of acute injury and missed BP meds while in ED for several hours  Resume home meds & start hydralazine 5 mg IV PRN for SBP >180 q6h; anticipate normalization of BP once home regimen is resumed on regular schedule    Continue diltiazem 120 mg twice daily  Continue lisinopril 40 mg p.o. daily  Start hydralazine 5 mg IV as needed every 6 hours for SBP greater than 180  Depending on how patient's blood pressure fares over the next few days while inpatient, can consider adding a third standing oral agent    Atrial fibrillation with RVR ([address])  Assessment & Plan  [id number] of 5. Takes Eliquis 5 mg twice daily as outpatient  Last Eliquis dose on a.m. of 11/27  Strictly speaking, patient does not require heparin bridge for procedure, but given his multiple comorbidities will initiate on heparin drip and defer to day team on whether or not to discontinue while hospitalized    Continue home diltiazem 120 mg twice daily  Continue home metoprolol 100 mg twice daily      Type 2 diabetes mellitus with diabetic neuropathy [facility])  Assessment & Plan  Lab Results   Component Value Date    HGBA1C 9.8 (H) 10/25/2023       Recent Labs     11/27/23  2319   POCGLU 117       Blood Sugar Average: Last 72 hrs:  (P) 117      Patient states that he has previously been on glargine 40 units nightly as only injectable agent, alongside 500 mg metformin twice daily. Patient states that since his last A1c check in the last month, that he has been recommended to decrease his glargine from 40 units nightly to 7 units nightly, and start mealtime lispro 6 units with meals. Patient states that he checks his blood sugars morning fasting along with before meals.   He states that his blood sugars fasting in the morning are between 100-150 while taking 40 units nightly and for this reason, he has not decreased his nightly glargine as previously recommended by his physician. He endorses compliance with mealtime lispro 6 units with meals, and states that his preprandial Accu-Cheks are 180s to 200s. Plan:  Hold metformin while inpatient  Start glargine on a reduced dose versus home dose at 30 units nightly for a 25% reduction from his preadmission needs  Correctional 3 times daily mealtime insulin with nighttime Accu-Cheks, algorithm 3  We will hold off on scheduled mealtime insulin and observe Accu-Cheks to see if scheduled insulins are truly needed  Carb controlled diet level 2  Will continue diet through the night as patient is not expected to be going to the OR until MRI is complete  Hypoglycemia protocol  When approaching discharge, can consider up titration of metformin with close follow-up with PCP depending on renal function    Hyperlipidemia  Assessment & Plan  Lab Results   Component Value Date    CHOLESTEROL 140 11/08/2018    TRIG 148 11/08/2018    HDL 38 (L) 11/08/2018    LDLCALC 72 11/08/2018         Continue home atorvastatin 40 mg p.o. daily  Consider repeating lipids in outpatient setting        VTE Pharmacologic Prophylaxis: Heparin gtt  VTE Mechanical Prophylaxis: sequential compression device    CHIEF COMPLAINT     Chief Complaint   Patient presents with    Wound Check     Wound care nurse came to house and told patient to come to ER for wound check. HISTORY OF PRESENT ILLNESS     Patient is a 72-year-old male past medical history type 2 diabetes complicated by neuropathy for past 10 years complicated by right TMA, HTN, BPH, A-fib, who presents to ED at recommendation of patient's podiatrist Dr. Olivia Eng for injury of left foot. Patient has had neuropathy of both feet up to the level right below the knee due to diabetes for the past 10 to 11 years per patient.   Patient was noted to have diabetic foot ulcer by Dr. Jamil Jade on 10/25 with I&D, washout, removal of 2 mg millimeter foreign body from left foot, supposedly glass. He has since been discharged home with frequent wound care nursing visits every 3 to 4 days. He also has regular visits with his podiatrist, Dr. Jamil Jade. Patient was noted by wound care nursing 3 days prior to admission to have new wound on bottom of left foot. Patient contacted Dr. Jamil Jade, who recommended that he go to the emergency room for evaluation, imaging, and further surgical intervention on the left lower extremity. At time of presentation, patient states that he is comfortable. He does not feel any pain at the wound site as he has advanced neuropathy. He is unaware of how he may have injured his foot as he has been following nonweightbearing restrictions very well and has been wearing his orthotic shoe along with complying with crutches strictly. Patient denies fevers, chills, nausea, vomiting, new onset weakness. He denies chest pain, shortness of breath. Patient denies tobacco use, recreational drug use. He states that he has a gin and tonic every once in a while, but not on a frequent basis. Patient last took Eliquis this morning on 11/27. His [id number] is 5. Patient is confirmed level 1 full code. REVIEW OF SYSTEMS     Review of Systems   Constitutional:  Negative for chills, fatigue and fever. Respiratory:  Negative for cough and shortness of breath. Cardiovascular:  Negative for chest pain and palpitations. Gastrointestinal:  Negative for abdominal pain, constipation, diarrhea, nausea and vomiting. Skin:  Positive for wound (Bottom of L foot). Negative for rash. Neurological:  Negative for weakness and numbness.      OBJECTIVE     Vitals:    11/27/23 1525 11/27/23 1800 11/27/23 2248 11/27/23 2321   BP: (!) 213/103 (!) 190/100 145/82 (!) 171/95   BP Location: Right arm Right arm     Pulse: 86 98 88 62   Resp: 18 18 18    Temp: 97.9 °F (36.6 °C) TempSrc: Temporal      SpO2: 98% 96% 97% 98%      Temperature:   Temp (24hrs), Av °F (36.7 °C), Min:97.9 °F (36.6 °C), Max:98 °F (36.7 °C)    Temperature: 97.9 °F (36.6 °C)  Intake & Output:  I/O       None          Weights: There is no height or weight on file to calculate BMI. Weight (last 2 days)       None          Physical Exam  Vitals reviewed. Constitutional:       General: He is not in acute distress. HENT:      Head: Normocephalic and atraumatic. Mouth/Throat:      Mouth: Mucous membranes are moist.   Eyes:      General: No scleral icterus. Extraocular Movements: Extraocular movements intact. Cardiovascular:      Rate and Rhythm: Normal rate and regular rhythm. Heart sounds: No murmur heard. No friction rub. No gallop. Pulmonary:      Effort: Pulmonary effort is normal. No respiratory distress. Breath sounds: No stridor. No wheezing or rales. Abdominal:      General: There is no distension. Palpations: There is no mass. Tenderness: There is no abdominal tenderness. There is no guarding. Musculoskeletal:         General: Normal range of motion. Right lower leg: No edema. Left lower leg: Edema (trace nonpitting up to below knee) present. Skin:     General: Skin is warm and dry. Findings: Erythema present. No rash. Comments: LLE warmer than RLE (RLE is cool but not cold to touch) distal to knee with mild erythema & swelling noted in LLE (none noted on LLE). Skin around RIGHT TMA appears c/d/i and well-healed w/o dehiscence. LLE skin feels warm, skin is dry and intact. Well-healed scar along medial aspect superior to ankle from prior vessel grafting; foot dressing with kerlix appears clean w/o strikethrough   Neurological:      Mental Status: He is alert and oriented to person, place, and time. Sensory: Sensory deficit (BLE distal to knee, symmetrical) present.    Psychiatric:         Mood and Affect: Mood normal. Behavior: Behavior normal.         Thought Content:  Thought content normal.       PAST MEDICAL HISTORY     Past Medical History:   Diagnosis Date    Arthritis     Atrial fibrillation ([address])     Diagnosed 11/2018    Benign prostate hyperplasia 04/2002    Cellulitis     right lower leg     Colon polyp 2006    Diabetes mellitus ([address])     Hearing aid worn     bilat    Hearing loss     90% loss left ear and 40% right ear    History of clubfoot     "since birth"    History of pneumonia     History of TIA (transient ischemic attack) 04/25/2017 11/30/18 pt denies    Hyperlipidemia 5/15/2014    Hypertension     Infectious viral hepatitis     "cant remember type"    Irregular heart beat     Afib    Neuropathy     both feet    Osteomyelitis ([address])     right great toe    Pneumonia     Right middle lobe pulmonary nodule 11/6/2018    Seasickness     Teeth missing     Type 2 diabetes mellitus with diabetic neuropathy ([address]) 11/6/2018    Wears glasses     reading    Wound, open     bottom of right foot     PAST SURGICAL HISTORY     Past Surgical History:   Procedure Laterality Date    BUNIONECTOMY Right 12/4/2018    Procedure: 5TH METATARSAL BONE PARTIAL RESECTION, FULL THICKNESS DEBRIDEMENT OF DIABETIC ULCER;  Surgeon: Phylicia Kerr DPM;  [facility];  Service: Podiatry    CLOSURE DELAYED PRIMARY Left 10/30/2023    Procedure: CLOSURE DELAYED PRIMARY left foot;  Surgeon: Ana Fabry, DPM;  [facility];  Service: Podiatry    CLUB FOOT RELEASE Bilateral     COLONOSCOPY      COMPLEX WOUND CLOSURE TO EXTREMITY  4/27/2021    Procedure: COMPLEX WOUND CLOSURE TO EXTREMITY: RIGHT FOOT;  Surgeon: Eliel Luz MD;  [facility];  Service: Plastics    EXTERNAL FIXATOR APPLICATION Right 2/60/2776    Procedure: Application multiplane external fixation;  Surgeon: Debbie Conner DPM;  [facility];  Service: Podiatry    FOOT HARDWARE REMOVAL Right 2/17/2021    Procedure: REMOVAL EXTERNAL FIXATOR; Surgeon: Adry Hill DPM;  [facility];  Service: Podiatry    FOREIGN BODY REMOVAL Right 4/27/2021    Procedure: REMOVAL FOREIGN BODY EXTREMITY: RIGHT FOOT;  Surgeon: Dallin Perdoom MD;  [facility];  Service: Plastics    INCISION AND DRAINAGE OF WOUND Right 2/17/2021    Procedure: INCISION AND DRAINAGE (I&D) EXTREMITY;  Surgeon: Adry Hill DPM;  [facility];  Service: Podiatry    INCISION AND DRAINAGE OF WOUND Left 10/26/2023    Procedure: INCISION AND DRAINAGE (I&D) EXTREMITY; WASHOUT; REMOVAL OF FOREIGN BODY;  Surgeon: Feliberto Sadler DPM;  [facility];  Service: Podiatry    ORIF FOOT FRACTURE Right 3/4/2021    Procedure: VAC PLACEMENT; SCREW FIXATION RIGHT FOOT FUSION;  Surgeon: Adry Hlil DPM;  [facility];  Service: Podiatry    IA AMPUTATION FOOT TRANSMETARSAL Right 2/22/2021    Procedure: AMPUTATION TRANSMETATARSAL (TMA), REMOVAL NAIL IM T2 ICF HARDWARE;  Surgeon: Adry Hill DPM;  [facility];  Service: Podiatry    IA ARTHRD MIDTARSL/TARS MLT/TRANSVRS W/OSTEOT Right 2/12/2021    Procedure: foot ARTHRODESIS/FUSION;  Surgeon: Adry Hill DPM;  [facility];  Service: Podiatry     [address] &/FASCIA 20 SQ CM/< Right 12/2/2021    Procedure: DEBRIDEMENT OF TARSAL BONES WITH [place];  Surgeon: Adry Hill DPM;  [facility];  Service: Podiatry    IA LNGTH/SHRT TENDON LEG/ANKLE 1 TENDON SPX Right 2/12/2021    Procedure: LENGTHEN TIBIAL TENDON, TRANS HEEL CORD;  Surgeon: Adry Hill DPM;  [facility];  Service: Podiatry    IA MUSC MYOCUTANEOUS/FASCIOCUTANEOUS FLAP TRUNK Right 4/12/2021    Procedure: RECONSTRUCTION MICROSURGICAL W/ FREE FLAP;  Surgeon: Dallin Perdomo MD;  [facility];  Service: Plastics    IA MUSC MYOCUTANEOUS/FASCIOCUTANEOUS FLAP TRUNK Right 4/12/2021    Procedure: TAKEBACK RECONSTRUCTION MICROSURGICAL W/ FREE FLAP;  Surgeon: Valentine Thakkar Radames Schmid MD;  [facility];  Service: Plastics    [place] MUSC MYOCUTANEOUS/FASCIOCUTANEOUS FLAP TRUNK Right 2021    Procedure: RECONSTRUCTION MICROSURGICAL W/ FREE FLAP, RE EXPLORATION, MICRO VASCULAR REVISION;  Surgeon: Gabrielle Green MD;  [facility];  Service: Plastics    [place] OSTC PRTL EXOSTC/CONDYLC METAR HEAD Right 2020    Procedure: EXCISION EXOSTOSIS;  Surgeon: Domitila Bar DPM;  [facility];  Service: Podiatry    [place] SECONDARY CLOSURE SURG WOUND/DEHSN EXTSV/COMPLIC Right     Procedure: PRIMARY DELAYED CLOSURE;  Surgeon: Domitila Bar DPM;  [facility];  Service: Podiatry    TOE AMPUTATION Right     partial great toe    TONSILLECTOMY      VAC DRESSING APPLICATION Right 5149    Procedure: APPLICATION VAC DRESSING EXTREMITY;  Surgeon: Gabrielle Green MD;  [facility];  Service: [address] DEBRIDEMENT Right 3/1/2021    Procedure: DEBRIDEMENT LOWER EXTREMITY [facility] OUT); Surgeon: Gabrielle Green MD;  [facility];  Service: Plastics    WOUND DEBRIDEMENT Right 2021    Procedure: DEBRIDEMENT RIGHT FOOT;  Surgeon: Gabrielle Green MD;  [facility];  Service: Plastics    WOUND DEBRIDEMENT Right 2021    Procedure: DEBRIDEMENT LOWER EXTREMITY [facility] OUT);   Surgeon: Gabrielle Green MD;  [facility];  Service: Plastics    WOUND DEBRIDEMENT Right 2021    Procedure: DEBRIDEMENT LOWER EXTREMITY [facility] OUT): RIGHT FOOT;  Surgeon: Gabrielle Green MD;  [facility];  Service: Plastics     SOCIAL & FAMILY HISTORY     Social History     Substance and Sexual Activity   Alcohol Use Yes    Comment: occasionally       Social History     Substance and Sexual Activity   Drug Use Not Currently     Social History     Tobacco Use   Smoking Status Former    Types: Cigarettes    Quit date: 1988    Years since quittin.0 Smokeless Tobacco Former   Tobacco Comments    exposure to passive smoke     Family History   Problem Relation Age of Onset    Diabetes Father         mellitus     LABORATORY DATA     Labs: I have personally reviewed pertinent reports. Results from last 7 days   Lab Units 11/27/23  1810   WBC Thousand/uL 9.85   HEMOGLOBIN g/dL 14.3   HEMATOCRIT % 43.4   PLATELETS Thousands/uL 201   NEUTROS PCT % 64   MONOS PCT % 6   EOS PCT % 3      Results from last 7 days   Lab Units 11/27/23  1810   POTASSIUM mmol/L 5.0   CHLORIDE mmol/L 106   CO2 mmol/L 23   BUN mg/dL 20   CREATININE mg/dL 0.95   CALCIUM mg/dL 9.6   ALK PHOS U/L 121*   ALT U/L 16   AST U/L 18                          Micro:  Lab Results   Component Value Date    BLOODCX No Growth After 5 Days. 10/24/2023    BLOODCX No Growth After 5 Days. 10/24/2023    BLOODCX No Growth After 5 Days. 02/16/2021    URINECX 80,000-89,000 cfu/ml Staphylococcus epidermidis (A) 10/24/2023    URINECX >100,000 cfu/ml Staphylococcus coagulase negative (A) 05/02/2022    URINECX No Growth <1000 cfu/mL 11/13/2019    WOUNDCULT 1+ Growth of Staphylococcus aureus (A) 10/25/2023    WOUNDCULT 1+ Growth of 10/25/2023    WOUNDCULT 1+ Growth of Klebsiella pneumoniae ESBL (A) 12/02/2021    WOUNDCULT 2+ Growth of Staphylococcus aureus (A) 12/02/2021     IMAGING & DIAGNOSTIC TESTS     Imaging: I have personally reviewed pertinent reports. No results found. EKG, Pathology, and Other Studies: I have personally reviewed pertinent reports. ALLERGIES     Allergies   Allergen Reactions    Simvastatin Myalgia    Itraconazole Other (See Comments)     Pt denies knowledge of allergy. Lizzy Newby MEDICATIONS PRIOR TO ARRIVAL     Prior to Admission medications    Medication Sig Start Date End Date Taking?  Authorizing Provider   Accu-Chek Guide test strip TEST BLOOD SUGARS once a day 4/4/23  Yes Henrietta Drew MD   acetaminophen (Mapap Arthritis Pain) 650 mg CR tablet Take 1 tablet (650 mg total) by mouth 2 (two) times a day 5/18/21  Yes KIRIT King   atorvastatin (LIPITOR) 40 mg tablet Take 1 tablet (40 mg total) by mouth daily 12/29/22  Yes Rush Ackerman MD   glucose blood (OneTouch Verio) test strip Use 1 each daily Use as instructed 3/8/22  Yes Rush Ackerman MD   Insulin Glargine Solostar (Lantus SoloStar) 100 UNIT/ML SOPN Inject 7 units daily  Patient taking differently: 40 Units Inject 7 units daily 11/1/23  Yes Jessy Salinas DO   insulin lispro (HumaLOG) 100 units/mL injection pen INJECT 6 UNITS UNDER THE SKIN 3 (THREE) TIMES A DAY WITH MEALS 11/25/23 12/25/23 Yes Loraine Salinas DO   Lancets (onetouch ultrasoft) lancets Use as instructed 3/8/22  Yes Rush Ackerman MD   Lancets (onetouch ultrasoft) lancets Use daily 7/31/23  Yes Rush Ackerman MD   Clenpiq 10-3.5-12 MG-GM -GM/160ML SOLN  6/15/22   Historical Provider, MD   diltiazem (CARDIZEM) 120 MG tablet Take 1 tablet (120 mg total) by mouth 2 (two) times a day 11/1/23 1/30/24  Jessy Salinas DO   Eliquis 5 MG Take 1 tablet (5 mg total) by mouth 2 (two) times a day 12/29/22   Rush Ackerman MD   glucose blood (OneTouch Ultra) test strip Use as instructed 3/8/22   Rush Ackerman MD   lisinopril (ZESTRIL) 40 mg tablet Take 1 tablet (40 mg total) by mouth daily 12/29/22   Rush Ackerman MD   metFORMIN (GLUCOPHAGE) 500 mg tablet Take 1 tablet (500 mg total) by mouth 2 (two) times a day with meals 12/29/22   Rush Ackerman MD   metoprolol succinate (TOPROL-XL) 100 mg 24 hr tablet Take 1 tablet (100 mg total) by mouth 2 (two) times a day 11/1/23 1/30/24  Jessy Salinas DO   tamsulosin (FLOMAX) 0.4 mg TAKE 1 CAPSULE (0.4 MG TOTAL) BY MOUTH DAILY WITH DINNER 10/16/23   Rush Ackerman MD     MEDICATIONS ADMINISTERED IN LAST 24 HOURS     Medication Administration - last 24 hours from 11/27/2023 0014 to 11/28/2023 0014         Date/Time Order Dose Route Action Action by     11/27/2023 0720 EST heparin (porcine) 25,000 units in 0.45% NaCl 250 mL infusion (premix) 18 Units/kg/hr Intravenous New Bag Viola Cunningham RN          CURRENT MEDICATIONS     Current Facility-Administered Medications   Medication Dose Route Frequency Provider Last Rate    acetaminophen  650 mg Oral Q6H PRN Faheem Young MD      aluminum-magnesium hydroxide-simethicone  30 mL Oral Q6H PRN Faheem Young MD      atorvastatin  40 mg Oral Daily Faheem Young MD      diltiazem  120 mg Oral BID Faheem Young MD      heparin (porcine)  3-30 Units/kg/hr (Order-Specific) Intravenous Titrated Faheem Young MD 18 Units/kg/hr (11/27/23 2350)    heparin (porcine)  4,200 Units Intravenous Q6H PRN Faheem Young MD      heparin (porcine)  8,400 Units Intravenous Once Faheem Young MD      heparin (porcine)  8,400 Units Intravenous Q6H PRN Faheem Young MD      hydrALAZINE  5 mg Intravenous Q6H PRN Faheem Young MD      insulin glargine  30 Units Subcutaneous HS Faheem Young MD      insulin lispro  1-5 Units Subcutaneous HS Faheem Young MD      insulin lispro  1-6 Units Subcutaneous TID [facility] Faheem Young MD      lisinopril  40 mg Oral Daily Faheem Young MD      metFORMIN  500 mg Oral BID With Meals Faheem Young MD      metoprolol succinate  100 mg Oral BID Faheem Young MD      ondansetron  4 mg Intravenous Q6H PRN Faheem Young MD      senna  1 tablet Oral Daily Faheem Young MD      tamsulosin  0.4 mg Oral Daily With Rula Burris MD       heparin (porcine), 3-30 Units/kg/hr (Order-Specific), Last Rate: 18 Units/kg/hr (11/27/23 2350)      acetaminophen, 650 mg, Q6H PRN  aluminum-magnesium hydroxide-simethicone, 30 mL, Q6H PRN  heparin (porcine), 4,200 Units, Q6H PRN  heparin (porcine), 8,400 Units, Q6H PRN  hydrALAZINE, 5 mg, Q6H PRN  ondansetron, 4 mg, Q6H PRN        Admission Time  I spent 1 hour admitting the patient.   This involved direct patient contact where I performed a full history and physical, reviewing previous records, and reviewing laboratory and other diagnostic studies. Portions of the record may have been created with voice recognition software. Occasional wrong word or "sound a like" substitutions may have occurred due to the inherent limitations of voice recognition software.   Read the chart carefully and recognize, using context, where substitutions have occurred.    ==  Maury Tong MD  [id number]  Internal Medicine Residency PGY-3

## 2023-11-28 NOTE — ASSESSMENT & PLAN NOTE
Lab Results   Component Value Date    HGBA1C 9.8 (H) 10/25/2023       Recent Labs     11/30/23  1647 11/30/23  2055 12/01/23  0744 12/01/23  1134   POCGLU 94 180* 100 254*       Blood Sugar Average: Last 72 hrs:  (P) 166    After clarifying with patient, home regimen is 40 units of lantus at bedtime, 6 units of lispro with meals and 500 mg metformin twice daily. He states that his home fasting blood sugars in the morning are between 100-150 while taking 40 units nightly and for this reason, he has not decreased his nightly glargine as previously recommended by his physician. He states that his preprandial Accu-Cheks are 180s to 200s. Most recent HgbA1c is 9.8 in Oct 2023. As noted above this is the likely cause of chronic foot wounds and acute infection this admission. BG mostly within goal range this admission on regimen below.     Plan:  Resume home regimen on discharge  PCP follow-up for further adjustments

## 2023-11-28 NOTE — OCCUPATIONAL THERAPY NOTE
Occupational Therapy Evaluation     Patient Name: Silvia Kim   Today's Date: 11/28/2023  Problem List  Principal Problem:    Puncture wound of left foot with foreign body, subsequent encounter  Active Problems:    Hyperlipidemia    Type 2 diabetes mellitus with diabetic neuropathy (HCC)    Atrial fibrillation with RVR ([address])    Essential hypertension    S/P transmetatarsal amputation of foot, right [facility])    Past Medical History  Past Medical History:   Diagnosis Date    Arthritis     Atrial fibrillation ([address])     Diagnosed 11/2018    Benign prostate hyperplasia 04/2002    Cellulitis     right lower leg     Colon polyp 2006    Diabetes mellitus ([address])     Hearing aid worn     bilat    Hearing loss     90% loss left ear and 40% right ear    History of clubfoot     "since birth"    History of pneumonia     History of TIA (transient ischemic attack) 04/25/2017 11/30/18 pt denies    Hyperlipidemia 5/15/2014    Hypertension     Infectious viral hepatitis     "cant remember type"    Irregular heart beat     Afib    Neuropathy     both feet    Osteomyelitis ([address])     right great toe    Pneumonia     Right middle lobe pulmonary nodule 11/6/2018    Seasickness     Teeth missing     Type 2 diabetes mellitus with diabetic neuropathy ([address]) 11/6/2018    Wears glasses     reading    Wound, open     bottom of right foot     Past Surgical History  Past Surgical History:   Procedure Laterality Date    BUNIONECTOMY Right 12/4/2018    Procedure: 5TH METATARSAL BONE PARTIAL RESECTION, FULL THICKNESS DEBRIDEMENT OF DIABETIC ULCER;  Surgeon: Karlie Moy DPM;  [facility];  Service: Podiatry    CLOSURE DELAYED PRIMARY Left 10/30/2023    Procedure: CLOSURE DELAYED PRIMARY left foot;  Surgeon: Bayron Rivas DPM;  [facility];  Service: Podiatry    CLUB FOOT RELEASE Bilateral     COLONOSCOPY      COMPLEX WOUND CLOSURE TO EXTREMITY  4/27/2021    Procedure: COMPLEX WOUND CLOSURE TO EXTREMITY: RIGHT FOOT; Surgeon: Sintia Gonzalez MD;  [facility];  Service: Plastics    EXTERNAL FIXATOR APPLICATION Right 0/70/3979    Procedure: Application multiplane external fixation;  Surgeon: Eyal Bedolla DPM;  [facility];  Service: Podiatry    FOOT HARDWARE REMOVAL Right 2/17/2021    Procedure: REMOVAL EXTERNAL FIXATOR;  Surgeon: Eyal Bedolla DPM;  [facility];  Service: Podiatry    FOREIGN BODY REMOVAL Right 4/27/2021    Procedure: REMOVAL FOREIGN BODY EXTREMITY: RIGHT FOOT;  Surgeon: Sintia Gonzalez MD;  [facility];  Service: Plastics    INCISION AND DRAINAGE OF WOUND Right 2/17/2021    Procedure: INCISION AND DRAINAGE (I&D) EXTREMITY;  Surgeon: Eyal Bedolla DPM;  [facility];  Service: Podiatry    INCISION AND DRAINAGE OF WOUND Left 10/26/2023    Procedure: INCISION AND DRAINAGE (I&D) EXTREMITY; WASHOUT; REMOVAL OF FOREIGN BODY;  Surgeon: Wilver Farmer DPM;  [facility];  Service: Podiatry    ORIF FOOT FRACTURE Right 3/4/2021    Procedure: VAC PLACEMENT; SCREW FIXATION RIGHT FOOT FUSION;  Surgeon: Eyal Bedolla DPM;  [facility];  Service: Podiatry    NH AMPUTATION FOOT TRANSMETARSAL Right 2/22/2021    Procedure: AMPUTATION TRANSMETATARSAL (TMA), REMOVAL NAIL IM T2 ICF HARDWARE;  Surgeon: Eyal Bedolla DPM;  [facility];  Service: Podiatry    NH ARTHRD MIDTARSL/TARS MLT/TRANSVRS W/OSTEOT Right 2/12/2021    Procedure: foot ARTHRODESIS/FUSION;  Surgeon: Eyal Bedolla DPM;  [facility];  Service: Podiatry    NH DEBRIDEMENT BONE MUSCLE &/FASCIA 20 SQ CM/< Right 12/2/2021    Procedure: DEBRIDEMENT OF TARSAL BONES WITH Mila Miranda;  Surgeon: Eyal Bedolla DPM;  [facility];  Service: Podiatry    NH LNGTH/SHRT TENDON LEG/ANKLE 1 TENDON SPX Right 2/12/2021    Procedure: LENGTHEN TIBIAL TENDON, TRANS HEEL CORD;  Surgeon: Eyal Bedolla DPM;  [facility];  Service: [address] [place] [address] MYOCUTANEOUS/FASCIOCUTANEOUS FLAP TRUNK Right 4/12/2021    Procedure: RECONSTRUCTION MICROSURGICAL W/ FREE FLAP;  Surgeon: Gabrielle Green MD;  [facility];  Service: Plastics    OR MUSC MYOCUTANEOUS/FASCIOCUTANEOUS FLAP TRUNK Right 4/12/2021    Procedure: TAKEBACK RECONSTRUCTION MICROSURGICAL W/ FREE FLAP;  Surgeon: Gabrielle Green MD;  [facility];  Service: Plastics    OR MUSC MYOCUTANEOUS/FASCIOCUTANEOUS FLAP TRUNK Right 4/13/2021    Procedure: RECONSTRUCTION MICROSURGICAL W/ FREE FLAP, RE EXPLORATION, MICRO VASCULAR REVISION;  Surgeon: Gabrielle Green MD;  [facility];  Service: Plastics    OR OSTC PRTL EXOSTC/CONDYLC METAR HEAD Right 12/29/2020    Procedure: EXCISION EXOSTOSIS;  Surgeon: Domitila Bar DPM;  [facility];  Service: Podiatry    OR SECONDARY CLOSURE SURG WOUND/DEHSN EXTSV/COMPLIC Right 60/96/5576    Procedure: PRIMARY DELAYED CLOSURE;  Surgeon: Domitila Bar DPM;  [facility];  Service: Podiatry    TOE AMPUTATION Right     partial great toe    TONSILLECTOMY      VAC DRESSING APPLICATION Right 6/4/1089    Procedure: APPLICATION VAC DRESSING EXTREMITY;  Surgeon: Gabrielle Green MD;  [facility];  Service: [address] DEBRIDEMENT Right 3/1/2021    Procedure: DEBRIDEMENT LOWER EXTREMITY [facility] OUT); Surgeon: Gabrielle Green MD;  [facility];  Service: Plastics    WOUND DEBRIDEMENT Right 4/7/2021    Procedure: DEBRIDEMENT RIGHT FOOT;  Surgeon: Gabrielle Green MD;  [facility];  Service: Plastics    WOUND DEBRIDEMENT Right 4/12/2021    Procedure: DEBRIDEMENT LOWER EXTREMITY [facility] OUT);   Surgeon: Gabrielle Green MD;  [facility];  Service: Plastics    WOUND DEBRIDEMENT Right 4/27/2021    Procedure: DEBRIDEMENT LOWER EXTREMITY [facility] OUT): RIGHT FOOT;  Surgeon: Gabrielle Green MD;  [facility];  Service: Plastics 11/28/23 0845   OT Last Visit   OT Visit Date 11/28/23   Note Type   Note type Evaluation   Pain Assessment   Pain Assessment Tool 0-10   Restrictions/Precautions   Weight Bearing Precautions Per Order Yes   RUE Weight Bearing Per Order WBAT   LUE Weight Bearing Per Order WBAT   RLE Weight Bearing Per Order WBAT   LLE Weight Bearing Per Order NWB   Braces or Orthoses Other (Comment)  (toe off loading shoe, special shoe R foot 2* TMA)   Home Living   Type of Home House   Home Layout One level;Stairs to enter with rails; Ramped entrance   [employer]  (and tub shower)   [address] [facility]; Wheelchair-manual;Crutches   Prior Function   Level of [place] Independent with ADLs; Independent with functional mobility; Needs assistance with IADLS   Lives With Spouse; Family   Receives Help From Family   IADLs Independent with meal prep; Independent with medication management; Family/Friend/Other provides transportation   Falls in the last 6 months 0   Vocational Retired   [address] and mobility - i iadls - shares homemaking with family - reports he was 1* using w/c to keep weight off L foot pta   Reciprocal Relationships supportive family   Service to Others retired   Intrinsic Gratification sedentary - reports he has not been doing much or going out 2* WB limitations   Subjective   Subjective offers no c/o   ADL   Eating Assistance 7  Independent   Grooming Assistance 5  Supervision/Setup   UB Bathing Assistance 5  Supervision/Setup   LB Bathing Assistance 5  Supervision/Setup   UB Dressing Assistance 5  Supervision/Setup   LB Dressing Assistance 5  Supervision/Setup   Toileting Assistance  5  Supervision/Setup   Transfers   Sit to Stand 5  Supervision   Stand to Sit 5  Supervision   Stand pivot 5  Supervision   Additional Comments demonstrated ability to complete hop steps as well as SPT kneeling on L knee on chair during pivot to maintain NWB   Functional Mobility   Functional Mobility 5  Supervision   Additional items Rolling walker;Crutches   Balance   Static Sitting Good   Dynamic Sitting Fair +   Static Standing Fair +   Dynamic Standing Fair   Ambulatory Fair   Activity Tolerance   Activity Tolerance Patient tolerated treatment well   RUE Assessment   RUE Assessment WFL   LUE Assessment   LUE Assessment WFL   Cognition   Overall Cognitive Status WFL   Assessment   Limitation Decreased endurance;Decreased high-level ADLs; Decreased self-care trans   Prognosis Good   Assessment Pt is a 72 y.o. male who was admitted to [address] [place] [address] on 11/27/2023 with Puncture wound of left foot with foreign body, subsequent encounter . Patient  has a past medical history of Arthritis, Atrial fibrillation ([address]), Benign prostate hyperplasia, Cellulitis, Colon polyp, Diabetes mellitus ([address]), Hearing aid worn, Hearing loss, History of clubfoot, History of pneumonia, History of TIA (transient ischemic attack), Hyperlipidemia, Hypertension, Infectious viral hepatitis, Irregular heart beat, Neuropathy, Osteomyelitis ([address]), Pneumonia, Right middle lobe pulmonary nodule, Seasickness, Teeth missing, Type 2 diabetes mellitus with diabetic neuropathy ([address]), Wears glasses, and Wound, open. At baseline pt was completing adls and mobility independently (was 1* using w/c 2* WB restrictions)  - family assists with iadls. Pt lives with spouse in 1 story home with ramped entrance. Currently pt requires sba for overall ADLS and sba for functional mobility/transfers. Pt currently presents with impairments in the following categories -difficulty performing IADLS  endurance and standing balance/tolerance.  These impairments, as well as pt's WBS  and risk for falls  limit pt's ability to safely engage in all baseline areas of occupation, includingcommunity mobility, laundry , driving, house maintenance, meal prep, cleaning, social participation , and leisure activities  however has supportive family who are able to provide assist and all necessary DME at home - no acute OT needs indicated - anticipate d/c home with family support when medically cleared - d/c from caseload   Goals   Patient Goals eat something   Plan   OT Frequency Eval only   Discharge Recommendation   Rehab Resource Intensity Level, OT No post-acute rehabilitation needs   AM-PAC Daily Activity Inpatient   Lower Body Dressing 3   Bathing 3   Toileting 4   Upper Body Dressing 4   Grooming 4   Eating 4   Daily Activity Raw Score 22   Daily Activity Standardized Score (Calc for Raw Score >=11) 47. 1   AM-PAC Applied Cognition Inpatient   Following a Speech/Presentation 4   Understanding Ordinary Conversation 4   Taking Medications 4   Remembering Where Things Are Placed or Put Away 4   Remembering List of 4-5 Errands 4   Taking Care of Complicated Tasks 4   Applied Cognition Raw Score 24   Applied Cognition Standardized Score 62.21   End of Consult   Education Provided Yes   Patient Position at End of Consult Bedside chair; All needs within reach   Nurse Communication Nurse aware of consult       The patient's raw score on the AM-PAC Daily Activity Inpatient Short Form is 22. A raw score of greater than or equal to 19 suggests the patient may benefit from discharge to home. Please refer to the recommendation of the Occupational Therapist for safe discharge planning.       Documentation Completed By:    TEODORO Casey/L  [employer] Certified - [id number]

## 2023-11-28 NOTE — ASSESSMENT & PLAN NOTE
Patient presenting with left fourth interdigital space wound status post incision and drainage on 10/27/2023, left foot delayed primary closure 10/30/2023, left dorsal medial second toe wound. These are both complications of type 2 diabetes neuropathy. Patient noted to have ascending erythema and localized edema with podiatry. Evaluated by podiatry in the ED who recommended initiation of IV antibiotics with eventual plan for or washout. Last Eliquis dose of a.m. of 11/27  [id number] of 5 does not require heparin bridge, but will initiate starting 11/27.    -Wound culture gram stain showed +1 gram positive cocci in pairs  -Wound culture positive for anaerobic, gram positive cocci as well as 2+ staph aureus.    -X-ray consistent with possible osteomyelitis as well as soft tissue swelling  -MRI showing definite osteomyelitis involving the fourth metatarsal head and probable early osteomyelitis involving the fourth toe proximal phalanx, plantar soft tissue ulcer at the level of the fourth MTP joint with associated superficial soft tissue gas and probable necrotizing infection, no evidence of a discrete abscess  -No growth on 72 hour blood cultures     Plan:  Antibiotics as above  Outpatient [facility] and podiatry follow-up

## 2023-11-28 NOTE — PHYSICAL THERAPY NOTE
Physical Therapy Evaluation     Patient's Name: Gemini Loaiza.     Admitting Diagnosis  Foot injury [S99.596I]  Puncture wound of left foot with foreign body, subsequent encounter [P64.316S]    Problem List  Patient Active Problem List   Diagnosis    Hyperlipidemia    Type 2 diabetes mellitus with diabetic neuropathy ([address])    Atrial fibrillation with RVR ([address])    Right middle lobe pulmonary nodule    Essential hypertension    Benign prostatic hyperplasia with lower urinary tract symptoms    Microalbuminuria    Cellulitis of extremity    Aftercare for amputation stump    Ambulatory dysfunction    S/P transmetatarsal amputation of foot, right (HCC)    Sleep disturbance    Anxiety    S/P R ALT flap to right foot    COVID-19    Gait abnormality    Vasomotor rhinitis    Corns and callosities    Tinea unguium    Cellulitis of left lower extremity    Puncture wound of left foot with foreign body, subsequent encounter       Past Medical History  Past Medical History:   Diagnosis Date    Arthritis     Atrial fibrillation ([address])     Diagnosed 11/2018    Benign prostate hyperplasia 04/2002    Cellulitis     right lower leg     Colon polyp 2006    Diabetes mellitus ([address])     Hearing aid worn     bilat    Hearing loss     90% loss left ear and 40% right ear    History of clubfoot     "since birth"    History of pneumonia     History of TIA (transient ischemic attack) 04/25/2017 11/30/18 pt denies    Hyperlipidemia 5/15/2014    Hypertension     Infectious viral hepatitis     "cant remember type"    Irregular heart beat     Afib    Neuropathy     both feet    Osteomyelitis ([address])     right great toe    Pneumonia     Right middle lobe pulmonary nodule 11/6/2018    Seasickness     Teeth missing     Type 2 diabetes mellitus with diabetic neuropathy ([address]) 11/6/2018    Wears glasses     reading    Wound, open     bottom of right foot       Past Surgical History  Past Surgical History:   Procedure Laterality Date    BUNIONECTOMY Right 12/4/2018    Procedure: 5TH METATARSAL BONE PARTIAL RESECTION, FULL THICKNESS DEBRIDEMENT OF DIABETIC ULCER;  Surgeon: Karlie Moy DPM;  [facility];  Service: Podiatry    CLOSURE DELAYED PRIMARY Left 10/30/2023    Procedure: CLOSURE DELAYED PRIMARY left foot;  Surgeon: Bayron Rivas DPM;  [facility];  Service: Podiatry    CLUB FOOT RELEASE Bilateral     COLONOSCOPY      COMPLEX WOUND CLOSURE TO EXTREMITY  4/27/2021    Procedure: COMPLEX WOUND CLOSURE TO EXTREMITY: RIGHT FOOT;  Surgeon: Dallin Perdomo MD;  [facility];  Service: Plastics    EXTERNAL FIXATOR APPLICATION Right 4/44/4226    Procedure: Application multiplane external fixation;  Surgeon: Adry Hill DPM;  [facility];  Service: Podiatry    FOOT HARDWARE REMOVAL Right 2/17/2021    Procedure: REMOVAL EXTERNAL FIXATOR;  Surgeon: Adry Hill DPM;  [facility];  Service: Podiatry    FOREIGN BODY REMOVAL Right 4/27/2021    Procedure: REMOVAL FOREIGN BODY EXTREMITY: RIGHT FOOT;  Surgeon: Dallin Perdomo MD;  [facility];  Service: Plastics    INCISION AND DRAINAGE OF WOUND Right 2/17/2021    Procedure: INCISION AND DRAINAGE (I&D) EXTREMITY;  Surgeon: Adry Hill DPM;  [facility];  Service: Podiatry    INCISION AND DRAINAGE OF WOUND Left 10/26/2023    Procedure: INCISION AND DRAINAGE (I&D) EXTREMITY; WASHOUT; REMOVAL OF FOREIGN BODY;  Surgeon: Feliberto Sadler DPM;  [facility];  Service: Podiatry    ORIF FOOT FRACTURE Right 3/4/2021    Procedure: VAC PLACEMENT; SCREW FIXATION RIGHT FOOT FUSION;  Surgeon: Adry Hill DPM;  [facility];  Service: Podiatry    MD AMPUTATION FOOT TRANSMETARSAL Right 2/22/2021    Procedure: AMPUTATION TRANSMETATARSAL (TMA), REMOVAL NAIL IM T2 ICF HARDWARE;  Surgeon: Adry Hill DPM;  [facility];  Service: Podiatry    MD ARTHRD MIDTARSL/TARS MLT/TRANSVRS W/OSTEOT Right 2/12/2021    Procedure: foot ARTHRODESIS/FUSION;  Surgeon: Domenica Whitaker DPM;  [facility];  Service: Podiatry    AL DEBRIDEMENT BONE MUSCLE &/FASCIA 20 SQ CM/< Right 12/2/2021    Procedure: DEBRIDEMENT OF TARSAL BONES WITH Ciro Muhammad;  Surgeon: Domenica Whitaker DPM;  [facility];  Service: Podiatry    AL LNGTH/SHRT TENDON LEG/ANKLE 1 TENDON SPX Right 2/12/2021    Procedure: LENGTHEN TIBIAL TENDON, TRANS HEEL CORD;  Surgeon: Domenica Whitaker DPM;  [facility];  Service: Podiatry    AL MUSC MYOCUTANEOUS/FASCIOCUTANEOUS FLAP TRUNK Right 4/12/2021    Procedure: RECONSTRUCTION MICROSURGICAL W/ FREE FLAP;  Surgeon: Janine Vu MD;  [facility];  Service: Plastics    AL MUSC MYOCUTANEOUS/FASCIOCUTANEOUS FLAP TRUNK Right 4/12/2021    Procedure: TAKEBACK RECONSTRUCTION MICROSURGICAL W/ FREE FLAP;  Surgeon: Janine Vu MD;  [facility];  Service: Plastics    AL MUSC MYOCUTANEOUS/FASCIOCUTANEOUS FLAP TRUNK Right 4/13/2021    Procedure: RECONSTRUCTION MICROSURGICAL W/ FREE FLAP, RE EXPLORATION, MICRO VASCULAR REVISION;  Surgeon: Janine Vu MD;  [facility];  Service: Plastics    AL OSTC PRTL EXOSTC/CONDYLC METAR HEAD Right 12/29/2020    Procedure: EXCISION EXOSTOSIS;  Surgeon: Domenica Whitaker DPM;  [facility];  Service: Podiatry    AL SECONDARY CLOSURE SURG WOUND/DEHSN EXTSV/COMPLIC Right 45/84/1072    Procedure: PRIMARY DELAYED CLOSURE;  Surgeon: Domenica Whitaker DPM;  [facility];  Service: Podiatry    TOE AMPUTATION Right     partial great toe    TONSILLECTOMY      VAC DRESSING APPLICATION Right 2/7/9909    Procedure: APPLICATION VAC DRESSING EXTREMITY;  Surgeon: Janine Vu MD;  [facility];  Service: [address] DEBRIDEMENT Right 3/1/2021    Procedure: DEBRIDEMENT LOWER EXTREMITY [facility] OUT);   Surgeon: Janine Vu MD;  [facility];  Service: Plastics    WOUND DEBRIDEMENT Right 4/7/2021    Procedure: DEBRIDEMENT RIGHT FOOT;  Surgeon: Marion Boas, MD;  [facility];  Service: Plastics    WOUND DEBRIDEMENT Right 4/12/2021    Procedure: DEBRIDEMENT LOWER EXTREMITY [facility] OUT); Surgeon: Marion Boas, MD;  [facility];  Service: Plastics    WOUND DEBRIDEMENT Right 4/27/2021    Procedure: DEBRIDEMENT LOWER EXTREMITY ([address]): RIGHT FOOT;  Surgeon: Marion Boas, MD;  [facility];  Service: Plastics        11/28/23 0901   PT Last Visit   PT Visit Date 11/28/23   Note Type   Note type Evaluation   Pain Assessment   Pain Assessment Tool 0-10   Pain Score No Pain   Restrictions/Precautions   Weight Bearing Precautions Per Order Yes   LLE Weight Bearing Per Order (S)  NWB  (as per podiatry  on 11/27)   Braces or Orthoses (S)  Other (Comment)  (forefoot offloading shoe L LE; R LE club foot (has modified shoe))   Home Living   Type of [address] [place] [address] One level;Stairs to enter with rails; Ramped entrance   [employer]  (and tub shower)   [address] [place] [address]. Walker;Cane;Crutches   Additional Comments Prior to this admission patient resided with spouse in a one level home ([address] from front but uses ramp in back). Walk in shower, tub shower. Standard toilet. Commode. Grab bars in shower. SPC. Lift Chair. WC. Patient reports that he largely has been dependent on WC and limiting walking on L LE- if he does he has been using crutches   Prior Function   Level of [place] Independent with ADLs; Independent with functional mobility; Independent with IADLS   Lives With Spouse; Family   Receives Help From Family   IADLs Independent with medication management; Independent with meal prep   Falls in the last 6 months 0   General   Additional Pertinent History 72 y.o. male admitted to [address] [place] [address] on 11/27/2023 as recommended for visiting wound RN. Patient has h/o I&D, washout, and removal of 2mm foreign body from left foot on 10/25. Patient was seen by podiatry on 11/27 and their consultation reports: " Recommend admission to hospital for IV antibiotics with wound debridement and washout in the operating room and possible delayed primary closure. No concrete OR plans at this time. Plan to continue local wound care while surgical planning proceeds." Patient is NWB L LE. Patient states he was recently provided with a forefoot offloading shoe for L LE which was donned during evaluation today. Family/Caregiver Present No   Cognition   Overall Cognitive Status WFL   Subjective   Subjective "I don't like hopping with the walker"   RUE Assessment   RUE Assessment WFL   LUE Assessment   LUE Assessment WFL   RLE Assessment   RLE Assessment WFL   LLE Assessment   LLE Assessment WFL   Bed Mobility   Supine to Sit Unable to assess   Sit to Supine Unable to assess   Additional Comments patient received in WC and in recliner post eval   Transfers   Sit to Stand 5  Supervision   Stand to Sit 5  Supervision   Stand pivot 5  Supervision   Additional Comments During PT evaluation, patient currently is requiring supervision to perform short distance ambulation and transfers. Patient received in [facility] and ambulated 2 feet (WC to bed) with use of RW and maintained L LE NWB by performing controlled hops on R LE. Patient then performed stand pivot transfer from bed to chair by standing/kneeling with L LE on chair and pivoting in order to fully maintain L LE NWB. Ambulation/Elevation   Gait pattern Excessively slow; Short stride;Decreased foot clearance;Decreased L stance   Gait Assistance 5  Supervision   Additional items Verbal cues   Assistive Device Rolling walker   Distance 2 feet (bed to chair)   Balance   Static Sitting Good   Static Standing Fair   Ambulatory Fair   Endurance Deficit   Endurance Deficit Yes   Endurance Deficit Description due to NWB status   Activity Tolerance   Activity Tolerance Patient tolerated treatment well   Medical Staff Made Aware This moderate complexity evaluation was performed with an occupational therapist due to the patient's co-morbidities, emerging presentation, and present impairments. Nurse Made Aware parul to see per RN   Assessment   Prognosis Good   Problem List Decreased skin integrity   Assessment PT completed evaluation of 72 y.o. male admitted to [address] [place] [address] on 11/27/2023 as recommended for visiting wound RN. Patient has h/o I&D, washout, and removal of 2mm foreign body from left foot on 10/25. Patient was seen by podiatry on 11/27 and their consultation reports: " Recommend admission to hospital for IV antibiotics with wound debridement and washout in the operating room and possible delayed primary closure. No concrete OR plans at this time. Plan to continue local wound care while surgical planning proceeds." Patient is NWB L LE. Patient states he was recently provided with a forefoot offloading shoe for L LE which was donned during evaluation today. Current emerging status includes continuous O2/HR monitoring, NWB status, and potential for pending procedure. PMH is significant for neuropathy, afib, HTN, and foot wound. Prior to this admission patient resided with spouse in a one level home ([address] from front but uses ramp in back). Walk in shower, tub shower. Standard toilet. Commode. Grab bars in shower. SPC. Lift Chair. WC. Patient reports that he largely has been dependent on WC and limiting walking on L LE- if he does he has been using crutches. Current impairments include decreased skin integrity, balance, and overall activity tolerance and gait deviations. During PT evaluation, patient currently is requiring supervision to perform short distance ambulation and transfers.  Patient received in [facility] and ambulated 2 feet (WC to bed) with use of RW and maintained L LE NWB by performing controlled hops on R LE. Patient then performed stand pivot transfer from bed to chair by standing/kneeling with L LE on chair and pivoting in order to fully maintain L LE NWB. Anticipate this patient is functioning at his baseline and he has all necessary DME to maintain WBS upon d/c. Recommend d/c home w/o need for f/u PT this admission or at time of d/c. Encouraged ongoing ambulation with S of RN staff. D/C inpt PT. Goals   Patient Goals to eat breakfast   LTG Expiration Date 12/11/23   Long Term Goal #1 This moderate complexity evaluation was performed with an occupational therapist due to the patient's co-morbidities, emerging presentation, and present impairments. PT Treatment Day 0   Plan   PT Frequency Other (Comment)  (d/c inpt PT)   Discharge Recommendation   Rehab Resource Intensity Level, PT No post-acute rehabilitation needs   Equipment Recommended   (has all necessary DME)   AM-PAC Basic Mobility Inpatient   Turning in Flat Bed Without Bedrails 4   Lying on Back to Sitting on Edge of Flat Bed Without Bedrails 4   Moving Bed to Chair 4   Standing Up From Chair Using Arms 4   Walk in Room 3   Climb 3-5 Stairs With Railing 3   Basic Mobility Inpatient Raw Score 22   Basic Mobility Standardized Score 47.4   Highest Level Of Mobility   JH-HLM Goal 7: Walk 25 feet or more   JH-HLM Achieved 4: Move to chair/commode     The patient's AM-PAC Basic Mobility Inpatient Standardized Score is greater than 42.9, suggesting this patient may benefit from discharge to home. Please also refer to the recommendation of the Physical Therapist for safe discharge planning.         Florence Dennis, PT, DPT

## 2023-11-29 ENCOUNTER — ANESTHESIA EVENT (INPATIENT)
Dept: PERIOP | Facility: HOSPITAL | Age: 73
DRG: 617 | End: 2023-11-29
Payer: COMMERCIAL

## 2023-11-29 ENCOUNTER — APPOINTMENT (INPATIENT)
Dept: RADIOLOGY | Facility: HOSPITAL | Age: 73
DRG: 617 | End: 2023-11-29
Payer: COMMERCIAL

## 2023-11-29 ENCOUNTER — ANESTHESIA (INPATIENT)
Dept: PERIOP | Facility: HOSPITAL | Age: 73
DRG: 617 | End: 2023-11-29
Payer: COMMERCIAL

## 2023-11-29 PROBLEM — M86.179 OSTEOMYELITIS OF FOOT, ACUTE (HCC): Status: ACTIVE | Noted: 2021-05-04

## 2023-11-29 LAB
ANION GAP SERPL CALCULATED.3IONS-SCNC: 7 MMOL/L
BASOPHILS # BLD AUTO: 0.03 THOUSANDS/ÂΜL (ref 0–0.1)
BASOPHILS NFR BLD AUTO: 0 % (ref 0–1)
BUN SERPL-MCNC: 15 MG/DL (ref 5–25)
CALCIUM SERPL-MCNC: 9.4 MG/DL (ref 8.4–10.2)
CHLORIDE SERPL-SCNC: 103 MMOL/L (ref 96–108)
CO2 SERPL-SCNC: 29 MMOL/L (ref 21–32)
CREAT SERPL-MCNC: 0.89 MG/DL (ref 0.6–1.3)
EOSINOPHIL # BLD AUTO: 0.27 THOUSAND/ÂΜL (ref 0–0.61)
EOSINOPHIL NFR BLD AUTO: 3 % (ref 0–6)
ERYTHROCYTE [DISTWIDTH] IN BLOOD BY AUTOMATED COUNT: 14 % (ref 11.6–15.1)
GFR SERPL CREATININE-BSD FRML MDRD: 86 ML/MIN/1.73SQ M
GLUCOSE SERPL-MCNC: 141 MG/DL (ref 65–140)
GLUCOSE SERPL-MCNC: 168 MG/DL (ref 65–140)
GLUCOSE SERPL-MCNC: 219 MG/DL (ref 65–140)
HCT VFR BLD AUTO: 44.5 % (ref 36.5–49.3)
HGB BLD-MCNC: 13.9 G/DL (ref 12–17)
IMM GRANULOCYTES # BLD AUTO: 0.02 THOUSAND/UL (ref 0–0.2)
IMM GRANULOCYTES NFR BLD AUTO: 0 % (ref 0–2)
LYMPHOCYTES # BLD AUTO: 2.08 THOUSANDS/ÂΜL (ref 0.6–4.47)
LYMPHOCYTES NFR BLD AUTO: 25 % (ref 14–44)
MCH RBC QN AUTO: 30.9 PG (ref 26.8–34.3)
MCHC RBC AUTO-ENTMCNC: 31.2 G/DL (ref 31.4–37.4)
MCV RBC AUTO: 99 FL (ref 82–98)
MONOCYTES # BLD AUTO: 0.75 THOUSAND/ÂΜL (ref 0.17–1.22)
MONOCYTES NFR BLD AUTO: 9 % (ref 4–12)
NEUTROPHILS # BLD AUTO: 5.27 THOUSANDS/ÂΜL (ref 1.85–7.62)
NEUTS SEG NFR BLD AUTO: 63 % (ref 43–75)
NRBC BLD AUTO-RTO: 0 /100 WBCS
PLATELET # BLD AUTO: 212 THOUSANDS/UL (ref 149–390)
PMV BLD AUTO: 10.8 FL (ref 8.9–12.7)
POTASSIUM SERPL-SCNC: 4 MMOL/L (ref 3.5–5.3)
RBC # BLD AUTO: 4.5 MILLION/UL (ref 3.88–5.62)
SODIUM SERPL-SCNC: 139 MMOL/L (ref 135–147)
WBC # BLD AUTO: 8.42 THOUSAND/UL (ref 4.31–10.16)

## 2023-11-29 PROCEDURE — 80048 BASIC METABOLIC PNL TOTAL CA: CPT

## 2023-11-29 PROCEDURE — 73630 X-RAY EXAM OF FOOT: CPT

## 2023-11-29 PROCEDURE — 28810 AMPUTATION TOE & METATARSAL: CPT | Performed by: PODIATRIST

## 2023-11-29 PROCEDURE — 85025 COMPLETE CBC W/AUTO DIFF WBC: CPT

## 2023-11-29 PROCEDURE — 88311 DECALCIFY TISSUE: CPT | Performed by: STUDENT IN AN ORGANIZED HEALTH CARE EDUCATION/TRAINING PROGRAM

## 2023-11-29 PROCEDURE — NC001 PR NO CHARGE: Performed by: PODIATRIST

## 2023-11-29 PROCEDURE — 82948 REAGENT STRIP/BLOOD GLUCOSE: CPT

## 2023-11-29 PROCEDURE — 0Y6W0Z0 DETACHMENT AT LEFT 4TH TOE, COMPLETE, OPEN APPROACH: ICD-10-PCS | Performed by: PODIATRIST

## 2023-11-29 PROCEDURE — 99233 SBSQ HOSP IP/OBS HIGH 50: CPT | Performed by: INTERNAL MEDICINE

## 2023-11-29 PROCEDURE — 88305 TISSUE EXAM BY PATHOLOGIST: CPT | Performed by: STUDENT IN AN ORGANIZED HEALTH CARE EDUCATION/TRAINING PROGRAM

## 2023-11-29 RX ORDER — OXYCODONE HYDROCHLORIDE AND ACETAMINOPHEN 5; 325 MG/1; MG/1
1 TABLET ORAL EVERY 4 HOURS PRN
Status: DISCONTINUED | OUTPATIENT
Start: 2023-11-29 | End: 2023-12-01 | Stop reason: HOSPADM

## 2023-11-29 RX ORDER — ONDANSETRON 2 MG/ML
INJECTION INTRAMUSCULAR; INTRAVENOUS AS NEEDED
Status: DISCONTINUED | OUTPATIENT
Start: 2023-11-29 | End: 2023-11-29

## 2023-11-29 RX ORDER — LIDOCAINE HYDROCHLORIDE 20 MG/ML
INJECTION, SOLUTION EPIDURAL; INFILTRATION; INTRACAUDAL; PERINEURAL AS NEEDED
Status: DISCONTINUED | OUTPATIENT
Start: 2023-11-29 | End: 2023-11-29

## 2023-11-29 RX ORDER — SODIUM CHLORIDE, SODIUM LACTATE, POTASSIUM CHLORIDE, CALCIUM CHLORIDE 600; 310; 30; 20 MG/100ML; MG/100ML; MG/100ML; MG/100ML
INJECTION, SOLUTION INTRAVENOUS CONTINUOUS PRN
Status: DISCONTINUED | OUTPATIENT
Start: 2023-11-29 | End: 2023-11-29

## 2023-11-29 RX ORDER — HYDROMORPHONE HCL/PF 1 MG/ML
0.5 SYRINGE (ML) INJECTION
Status: DISCONTINUED | OUTPATIENT
Start: 2023-11-29 | End: 2023-11-29

## 2023-11-29 RX ORDER — ONDANSETRON 2 MG/ML
4 INJECTION INTRAMUSCULAR; INTRAVENOUS ONCE AS NEEDED
Status: DISCONTINUED | OUTPATIENT
Start: 2023-11-29 | End: 2023-11-29

## 2023-11-29 RX ORDER — PROPOFOL 10 MG/ML
INJECTION, EMULSION INTRAVENOUS AS NEEDED
Status: DISCONTINUED | OUTPATIENT
Start: 2023-11-29 | End: 2023-11-29

## 2023-11-29 RX ORDER — FENTANYL CITRATE 50 UG/ML
INJECTION, SOLUTION INTRAMUSCULAR; INTRAVENOUS AS NEEDED
Status: DISCONTINUED | OUTPATIENT
Start: 2023-11-29 | End: 2023-11-29

## 2023-11-29 RX ORDER — MAGNESIUM HYDROXIDE 1200 MG/15ML
LIQUID ORAL AS NEEDED
Status: DISCONTINUED | OUTPATIENT
Start: 2023-11-29 | End: 2023-11-29 | Stop reason: HOSPADM

## 2023-11-29 RX ORDER — HYDROMORPHONE HCL IN WATER/PF 6 MG/30 ML
0.2 PATIENT CONTROLLED ANALGESIA SYRINGE INTRAVENOUS
Status: DISCONTINUED | OUTPATIENT
Start: 2023-11-29 | End: 2023-11-29

## 2023-11-29 RX ORDER — FENTANYL CITRATE/PF 50 MCG/ML
50 SYRINGE (ML) INJECTION
Status: DISCONTINUED | OUTPATIENT
Start: 2023-11-29 | End: 2023-11-29

## 2023-11-29 RX ORDER — METOCLOPRAMIDE HYDROCHLORIDE 5 MG/ML
10 INJECTION INTRAMUSCULAR; INTRAVENOUS ONCE AS NEEDED
Status: DISCONTINUED | OUTPATIENT
Start: 2023-11-29 | End: 2023-11-29

## 2023-11-29 RX ORDER — DIPHENHYDRAMINE HYDROCHLORIDE 50 MG/ML
12.5 INJECTION INTRAMUSCULAR; INTRAVENOUS ONCE AS NEEDED
Status: DISCONTINUED | OUTPATIENT
Start: 2023-11-29 | End: 2023-11-29

## 2023-11-29 RX ORDER — PHENYLEPHRINE HCL IN 0.9% NACL 1 MG/10 ML
SYRINGE (ML) INTRAVENOUS AS NEEDED
Status: DISCONTINUED | OUTPATIENT
Start: 2023-11-29 | End: 2023-11-29

## 2023-11-29 RX ORDER — LIDOCAINE HYDROCHLORIDE 10 MG/ML
0.5 INJECTION, SOLUTION EPIDURAL; INFILTRATION; INTRACAUDAL; PERINEURAL ONCE AS NEEDED
Status: CANCELLED | OUTPATIENT
Start: 2023-11-29

## 2023-11-29 RX ADMIN — METOPROLOL SUCCINATE 100 MG: 100 TABLET, EXTENDED RELEASE ORAL at 08:00

## 2023-11-29 RX ADMIN — PROPOFOL 50 MG: 10 INJECTION, EMULSION INTRAVENOUS at 16:05

## 2023-11-29 RX ADMIN — FENTANYL CITRATE 25 MCG: 50 INJECTION INTRAMUSCULAR; INTRAVENOUS at 16:53

## 2023-11-29 RX ADMIN — CEFAZOLIN SODIUM 2000 MG: 2 SOLUTION INTRAVENOUS at 08:00

## 2023-11-29 RX ADMIN — ONDANSETRON 4 MG: 2 INJECTION INTRAMUSCULAR; INTRAVENOUS at 16:55

## 2023-11-29 RX ADMIN — CHLORTHALIDONE 25 MG: 25 TABLET ORAL at 10:22

## 2023-11-29 RX ADMIN — DILTIAZEM HYDROCHLORIDE 120 MG: 60 TABLET ORAL at 21:28

## 2023-11-29 RX ADMIN — SODIUM CHLORIDE, SODIUM LACTATE, POTASSIUM CHLORIDE, AND CALCIUM CHLORIDE: .6; .31; .03; .02 INJECTION, SOLUTION INTRAVENOUS at 15:56

## 2023-11-29 RX ADMIN — LIDOCAINE HYDROCHLORIDE 100 MG: 20 INJECTION, SOLUTION EPIDURAL; INFILTRATION; INTRACAUDAL; PERINEURAL at 16:00

## 2023-11-29 RX ADMIN — FENTANYL CITRATE 50 MCG: 50 INJECTION INTRAMUSCULAR; INTRAVENOUS at 16:12

## 2023-11-29 RX ADMIN — FENTANYL CITRATE 25 MCG: 50 INJECTION INTRAMUSCULAR; INTRAVENOUS at 16:03

## 2023-11-29 RX ADMIN — DILTIAZEM HYDROCHLORIDE 120 MG: 60 TABLET ORAL at 08:01

## 2023-11-29 RX ADMIN — PROPOFOL 200 MG: 10 INJECTION, EMULSION INTRAVENOUS at 16:00

## 2023-11-29 RX ADMIN — Medication 100 MCG: at 16:15

## 2023-11-29 RX ADMIN — LISINOPRIL 40 MG: 20 TABLET ORAL at 08:00

## 2023-11-29 RX ADMIN — TAMSULOSIN HYDROCHLORIDE 0.4 MG: 0.4 CAPSULE ORAL at 19:16

## 2023-11-29 RX ADMIN — INSULIN GLARGINE 30 UNITS: 100 INJECTION, SOLUTION SUBCUTANEOUS at 21:32

## 2023-11-29 RX ADMIN — METOPROLOL SUCCINATE 100 MG: 100 TABLET, EXTENDED RELEASE ORAL at 21:32

## 2023-11-29 RX ADMIN — INSULIN LISPRO 5 UNITS: 100 INJECTION, SOLUTION INTRAVENOUS; SUBCUTANEOUS at 19:18

## 2023-11-29 RX ADMIN — ATORVASTATIN CALCIUM 40 MG: 40 TABLET, FILM COATED ORAL at 19:16

## 2023-11-29 RX ADMIN — INSULIN LISPRO 2 UNITS: 100 INJECTION, SOLUTION INTRAVENOUS; SUBCUTANEOUS at 21:33

## 2023-11-29 RX ADMIN — OXYCODONE HYDROCHLORIDE AND ACETAMINOPHEN 1 TABLET: 5; 325 TABLET ORAL at 19:32

## 2023-11-29 RX ADMIN — CEFAZOLIN SODIUM 2000 MG: 2 SOLUTION INTRAVENOUS at 19:16

## 2023-11-29 RX ADMIN — Medication 200 MCG: at 16:38

## 2023-11-29 RX ADMIN — CEFAZOLIN SODIUM 2000 MG: 2 SOLUTION INTRAVENOUS at 00:27

## 2023-11-29 NOTE — PROGRESS NOTES
Podiatry - Progress Note  Patient: Kari Dinh. 72 y.o. male   MRN: [id number]  PCP: Raz Dasilva MD  Unit/Bed#: [facility] 027-42 Encounter: [id number]  Date Of Visit: 23    ASSESSMENT:    Kari Carroll is a 72 y.o. male with:    Left fourth interdigital space wound  S/p left foot incision and drainage (DOS: 10/27/23)  S/p left foot delayed primary closure (DOS: 10/30/23)  Left dorsal medial second toe wound  Type 2 diabetes mellitus  Essential hypertension  Atrial fibrillation  History of right transmetatarsal amputation with free flap      PLAN:    Patient to go to OR today, 23, for left partial fourth ray resection with Dr. Axel Chopra. Consent signed with patient at bedside, scanned into media, placed in OR. Confirmed NPO status. H&P, vitals, and current labs reviewed. No acute changes noted. Alternatives, risks, and complications discussed with patient. All questions answered. No guarantees given of outcome. Rest of medical care per primary team.       SUBJECTIVE:     The patient was seen, evaluated, and assessed at bedside today. The patient was awake, alert, and in no acute distress. Patient confirmed NPO status. All questions and concerns regarding the surgical procedure addressed. Patient understands risks vs benefits of procedure and remains amenable with plan for surgery today. Patient denies N/V/F/chills/SOB/CP. OBJECTIVE:     Vitals:   /80   Pulse 78   Temp 98.4 °F (36.9 °C)   Resp 16   Ht 6' 4" (1.93 m)   Wt 104 kg (230 lb)   SpO2 96%   BMI 28.00 kg/m²     Temp (24hrs), Av.2 °F (36.8 °C), Min:97.9 °F (36.6 °C), Max:98.5 °F (36.9 °C)      Physical Exam:     General:  Alert, cooperative, and in no distress. Lower extremity exam:  Cardiovascular status at baseline. Neurological status at baseline. Musculoskeletal status at baseline. No calf tenderness noted bilaterally. Dressing left intact to the Operating Room.      Additional Data:     Labs:    Results from last 7 days   Lab Units 11/29/23  0614   WBC Thousand/uL 8.42   HEMOGLOBIN g/dL 13.9   HEMATOCRIT % 44.5   PLATELETS Thousands/uL 212   NEUTROS PCT % 63   LYMPHS PCT % 25   MONOS PCT % 9   EOS PCT % 3     Results from last 7 days   Lab Units 11/29/23  0614 11/28/23  0618   POTASSIUM mmol/L 4.0 4.0   CHLORIDE mmol/L 103 104   CO2 mmol/L 29 28   BUN mg/dL 15 15   CREATININE mg/dL 0.89 0.84   CALCIUM mg/dL 9.4 8.7   ALK PHOS U/L  --  98   ALT U/L  --  16   AST U/L  --  11*     Results from last 7 days   Lab Units 11/28/23  0618   INR  1.32*       * I Have Reviewed All Lab Data Listed Above. Recent Cultures (last 7 days):     Results from last 7 days   Lab Units 11/27/23 2144 11/27/23 2050   BLOOD CULTURE  No Growth at 24 hrs. No Growth at 24 hrs.  --    GRAM STAIN RESULT   --  1+ Polys*  1+ Gram positive cocci in pairs*   WOUND CULTURE   --  Culture too young- will reincubate     Results from last 7 days   Lab Units 11/27/23 2050   ANAEROBIC CULTURE  Culture results to follow. Imaging: I have personally reviewed pertinent films in PACS  EKG, Pathology, and Other Studies: I have personally reviewed pertinent reports. ** Please Note: Portions of the record may have been created with voice recognition software. Occasional wrong word or "sound a like" substitutions may have occurred due to the inherent limitations of voice recognition software. Read the chart carefully and recognize, using context, where substitutions have occurred.  **

## 2023-11-29 NOTE — QUICK NOTE
Called patient's wife Maximo Chris to update them on patient's current condition and plan of care. All of their questions were answered to their satisfaction and they are appreciative of call.

## 2023-11-29 NOTE — ANESTHESIA PREPROCEDURE EVALUATION
Procedure:  LEFT FOURTH RAY RESECTION FOOT (Left: Foot)    Relevant Problems   ANESTHESIA (within normal limits)      CARDIO   (+) Atrial fibrillation with RVR (HCC)   (+) Essential hypertension   (+) Hyperlipidemia      ENDO   (+) Type 2 diabetes mellitus with diabetic neuropathy (HCC)      GI/HEPATIC (within normal limits)      /RENAL   (+) Benign prostatic hyperplasia with lower urinary tract symptoms      HEMATOLOGY (within normal limits)      MUSCULOSKELETAL (within normal limits)      NEURO/PSYCH   (+) Anxiety   (+) Type 2 diabetes mellitus with diabetic neuropathy (HCC)      PULMONARY (within normal limits)      Other   (+) Osteomyelitis of foot, acute (HCC)   (+) Puncture wound of left foot with foreign body, subsequent encounter   (+) S/P R ALT flap to right foot   (+) S/P transmetatarsal amputation of foot, right (HCC)        Physical Exam    Airway    Mallampati score: II  TM Distance: >3 FB  Neck ROM: full     Dental   No notable dental hx     Cardiovascular  Rhythm: regular, Rate: normal, Cardiovascular exam normal    Pulmonary  Pulmonary exam normal Breath sounds clear to auscultation    Other Findings        Anesthesia Plan  ASA Score- 3     Anesthesia Type- general with ASA Monitors. Additional Monitors:     Airway Plan: LMA. Plan Factors-Exercise tolerance (METS): >4 METS. Chart reviewed. EKG reviewed. Imaging results reviewed. Existing labs reviewed. Patient summary reviewed. Induction- intravenous. Postoperative Plan- Plan for postoperative opioid use. Informed Consent- Anesthetic plan and risks discussed with patient. I personally reviewed this patient with the CRNA. Discussed and agreed on the Anesthesia Plan with the CRNA. Sofi Wallace               Recent labs personally reviewed:  Lab Results   Component Value Date    WBC 8.42 11/29/2023    HGB 13.9 11/29/2023     11/29/2023     Lab Results   Component Value Date     05/22/2018    K 4.0 11/29/2023    BUN 15 11/29/2023    CREATININE 0.89 11/29/2023    GLUCOSE 282 (H) 04/13/2021     Lab Results   Component Value Date    PTT 71 (H) 11/28/2023      Lab Results   Component Value Date    INR 1.32 (H) 11/28/2023       Blood type B    Lab Results   Component Value Date    HGBA1C 9.8 (H) 10/25/2023       I, Abiodun Garay MD, have personally seen and evaluated the patient prior to anesthetic care. I have reviewed the pre-anesthetic record, and other medical records if appropriate to the anesthetic care. If a CRNA is involved in the case, I have reviewed the CRNA assessment, if present, and agree. Risks/benefits and alternatives discussed with patient including possible PONV, sore throat, and possibility of rare anesthetic and surgical emergencies.

## 2023-11-29 NOTE — ANESTHESIA POSTPROCEDURE EVALUATION
Post-Op Assessment Note    CV Status:  Stable  Pain Score: 0    Pain management: adequate       Mental Status:  Alert and awake   Hydration Status:  Stable   PONV Controlled:  Controlled   Airway Patency:  Patent  Two or more mitigation strategies used for obstructive sleep apnea   Post Op Vitals Reviewed: Yes    No anethesia notable event occurred.     Staff: Anesthesiologist, CRNA               BP   119/86   Temp 98.6   Pulse 65   Resp 18   SpO2 97

## 2023-11-29 NOTE — ASSESSMENT & PLAN NOTE
Pt presented on 11/27 with L foot wound in the setting of uncontrolled diabetes. Pt has neuropathy and has not felt any pain at the wound site. He was started on IV cefazolin on 11/28 out of concern for soft tissue infection. MRI on 11/29/23 showed definite osteomyelitis of his 4th metatarsal head and likely early onset osteomyelitis in the 4th toe proximal phalanx. Plantar soft tissue ulcer at the level of the fourth MTP joint with associated superficial soft tissue gas and probable necrotizing infection. No evidence of a discrete abscess. Wound culture growing Staph aureus and Peptostreptococcus. As per podiatry surgical note on 11/29/2 all nonviable soft tissue and bone able to be removed, primary closure achieved. ID consulted for recommendations on antibiotic course. Patient now transitioned to unasyn.      Plan:   Per ID switch to IV Unasyn while in hospital then PO Augmentin upon discharge for a total of 7 days following surgical source control  PT OT recommending home with home health on discharge  Monitor wound area post-op with home health  Stable for discharge per podiatry, continue outpatient follow-up  Follow up OR pathology

## 2023-11-29 NOTE — CASE MANAGEMENT
Case Management Assessment & Discharge Planning Note    Patient name Louis Pringle.   Location [facility] 912/[facility] 912-01 MRN [id number]  : 1950 Date 2023       Current Admission Date: 2023  Current Admission Diagnosis:Osteomyelitis of foot, acute [facility])   Patient Active Problem List    Diagnosis Date Noted    Puncture wound of left foot with foreign body, subsequent encounter 2023    Cellulitis of left lower extremity 10/24/2023    Corns and callosities 2023    Tinea unguium 2023    Vasomotor rhinitis 2022    Gait abnormality 10/25/2022    COVID-19 10/18/2021    S/P R ALT flap to right foot 2021    Anxiety 2021    Osteomyelitis of foot, acute ([address]) 2021    Sleep disturbance 2021    S/P transmetatarsal amputation of foot, right ([address]) 2021    Aftercare for amputation stump 03/10/2021    Ambulatory dysfunction 03/10/2021    Cellulitis of extremity 2021    Microalbuminuria 2020    Essential hypertension 2019    Benign prostatic hyperplasia with lower urinary tract symptoms 2019    Type 2 diabetes mellitus with diabetic neuropathy ([address]) 2018    Atrial fibrillation with RVR ([address]) 2018    Right middle lobe pulmonary nodule 2018    Hyperlipidemia 05/15/2014      LOS (days): 1  Geometric Mean LOS (GMLOS) (days): 3.00  Days to GMLOS:2.2     OBJECTIVE:  PATIENT READMITTED [address] of Unplanned Readmission Score: 13.09         Current admission status: Inpatient       Preferred Pharmacy:   [address] [address] [address]  Phone: [phone] Fax: [facility],  [address] [place] [address]  Phone: [phone] Fax: [phone]    [id number] [place], [address] [facility]  [address] [address]  Phone: [phone] Fax: [address], [address] [facility] 3000 [facility] [place] [address] [place] [address]  Phone: [phone] Fax: [phone]    Primary Care Provider: Rush Ackerman MD    Primary Insurance: [facility]  Secondary Insurance:     ASSESSMENT:  [place] Proxies    There are no active Health Care Proxies on file. Readmission Root Cause  30 Day Readmission: Yes  Who directed you to return to the hospital?: Specialist  Did you understand whom to contact if you had questions or problems?: Yes  Did you get your prescriptions before you left the hospital?: Yes  Were you able to get your prescriptions filled when you left the hospital?: Yes  Did you take your medications as prescribed?: Yes  Were you able to get to your follow-up appointments?: Yes  During previous admission, was a post-acute recommendation made?: Yes  What post-acute resources were offered?: [facility]  Patient was readmitted due to: osteo foot  Action Plan: surgery    Patient Information  Admitted from[de-identified] Home  Mental Status: Alert                               Housing Stability: Unknown (10/25/2023)    Housing Stability Vital Sign     Unable to Pay for Housing in the Last Year: No     Number of Places Lived in the Last Year: Not on file     Unstable Housing in the Last Year: No   Food Insecurity: No Food Insecurity (10/25/2023)    Hunger Vital Sign     Worried About Running Out of Food in the Last Year: Never true     Ran Out of Food in the Last Year: Never true   Transportation Needs: No Transportation Needs (10/25/2023)    PRAPARE - Transportation     Lack of Transportation (Medical): No     Lack of Transportation (Non-Medical):  No   Utilities: Not on file       DISCHARGE DETAILS:    Discharge planning discussed with[de-identified] see assessment completed 10/25  Freedom of Choice: Yes

## 2023-11-29 NOTE — PROGRESS NOTES
INTERNAL MEDICINE RESIDENCY PROGRESS NOTE     Name: Sandeep Martinez. Age & Sex: 72 y.o. male   MRN: [id number]  Unit/Bed#: [facility] 912-01   Encounter: [id number]  Team: SOD Team C     PATIENT INFORMATION     Name: Sandeep Martinez. Age & Sex: 72 y.o. male   MRN: [id number]  Hospital Stay Days: 1    ASSESSMENT/PLAN     Principal Problem:    Osteomyelitis of foot, acute ([address])  Active Problems:    Type 2 diabetes mellitus with diabetic neuropathy ([address])    Puncture wound of left foot with foreign body, subsequent encounter    Essential hypertension    S/P transmetatarsal amputation of foot, right ([address])    Hyperlipidemia    Atrial fibrillation with RVR ([address])      * Osteomyelitis of foot, acute ([address])  Assessment & Plan  Pt presented on 11/27 with L foot wound in the setting of uncontrolled diabetes. Pt has neuropathy and has not felt any pain at the wound site. He was started on IV cefazolin on 11/28 out of concern for soft tissue infection. Wound culture grew gram positive cocci in pairs. MRI on 11/29/23 showed definite osteomyelitis of his 4th metatarsal head and likely early onset osteomyelitis in the 4th toe proximal phalanx. Plantar soft tissue ulcer at the level of the fourth MTP joint with associated superficial soft tissue gas and probable necrotizing infection. No evidence of a discrete abscess. Plan:   Continue IV cefazolin   OR today for debridement with podiatry  Monitor wound area post-op  Follow up OR cultures    Puncture wound of left foot with foreign body, subsequent encounter  Assessment & Plan  Patient presenting with left fourth interdigital space wound status post incision and drainage on 10/27/2023, left foot delayed primary closure 10/30/2023, left dorsal medial second toe wound. These are both complications of type 2 diabetes neuropathy. Patient noted to have ascending erythema and localized edema with podiatry.   Evaluated by podiatry in the ED who recommended initiation of IV antibiotics with eventual plan for or washout. Last Eliquis dose of a.m. of 11/27  [id number] of 5 does not require heparin bridge, but will initiate starting 11/27.    -Wound culture gram stain showed +1 gram positive cocci in pairs  -X-ray consistent with possible osteomyelitis as well as soft tissue swelling  -MRI showing definite osteomyelitis involving the fourth metatarsal head and probable early osteomyelitis involving the fourth toe proximal phalanx, plantar soft tissue ulcer at the level of the fourth MTP joint with associated superficial soft tissue gas and probable necrotizing infection, no evidence of a discrete abscess  -No growth on 24 hour blood cultures     Plan:  On IV cefazolin 1 g every 8h per podiatry recommendations  Current wound appears to be new/acute within past 3 days, therefore we will not add on metronidazole at this time  Await eventual OR cultures  Podiatry taking patient to OR today for planned debridement/washout with possible delayed primary closure  Follow-up blood cultures    Type 2 diabetes mellitus with diabetic neuropathy [facility])  Assessment & Plan  Lab Results   Component Value Date    HGBA1C 9.8 (H) 10/25/2023       Recent Labs     11/28/23  1612 11/28/23  2138 11/29/23  0812 11/29/23  1126   POCGLU 256* 98 141* 168*       Blood Sugar Average: Last 72 hrs:  (P) [id number]    After clarifying with patient, home regimen is 40 units of lantus at bedtime, 6 units of lispro with meals and 500 mg metformin twice daily. He states that his home fasting blood sugars in the morning are between 100-150 while taking 40 units nightly and for this reason, he has not decreased his nightly glargine as previously recommended by his physician. He states that his preprandial Accu-Cheks are 180s to 200s. Most recent HgbA1c is 9.8 in Oct 2023. As noted above this is the likely cause of chronic foot wounds and acute infection this admission.     Plan:  Hold metformin while inpatient  Continue glargine on a reduced dose versus home dose at 30 units nightly for a 25% reduction from his preadmission needs  SSI 3 algorithm 3 times daily mealtime insulin with nighttime Accu-Cheks,   Carb controlled diet level 2  Hypoglycemia protocol  BG goal 140-180, monitor for tight glycemic control after OR to help with healing  When approaching discharge, can consider up titration of metformin with close follow-up with PCP depending on renal function    Essential hypertension  Assessment & Plan  Blood Pressure: 162/89  Home regimen: Cardizem 120 mg twice daily, lisinopril 40 mg daily, Toprol 100 mg twice daily    On admission, pt hypertensive to 213/103 likely in setting of acute injury and missed BP meds while in ED for several hours    Today patient has blood pressures in the 150s/80s. Elevated systolic pressures may be related to patient's anxiety about upcoming procedure. Plan:  Continue home regimen  Add chlorthalidone 25 mg daily  Add hydralazine 5 mg IV as needed every 6 hours for SBP greater than 160  Continue to monitor vitals and symptoms    S/P transmetatarsal amputation of foot, right (HCC)  Assessment & Plan  Routine local skin care and pressure offloading while sitting in bed to prevent ulcers    Atrial fibrillation with RVR (HCC)  Assessment & Plan  [id number] of 5. Takes Eliquis 5 mg twice daily as outpatient. Last Eliquis dose on a.m. of 11/27  Strictly speaking, patient does not require heparin bridge for procedure, but given his multiple comorbidities will initiate on heparin drip.      Plan:  Heparin drip on hold in anticipation of OR today  Continue home diltiazem 120 mg twice daily and metoprolol 100 mg twice daily  Transition back to Eliquis when no further plans for surgery per podiatry      Hyperlipidemia  Assessment & Plan  Lab Results   Component Value Date    CHOLESTEROL 140 11/08/2018    TRIG 148 11/08/2018    HDL 38 (L) 11/08/2018    LDLCALC 72 11/08/2018 Plan:  Continue home atorvastatin 40 mg p.o. daily  Consider repeating lipids in outpatient setting        Disposition: continue inpatient care for osteomyelitis, discharge planning based on OR plans per podiatry, PT/OT recommending home on discharge but will ask for reevaluation after surgery    SUBJECTIVE     Patient seen and examined. No acute events overnight. Patient feels well but is a little anxious about his procedure. He is scheduled as an add on for the OR today with podiatry. OBJECTIVE     Vitals:    23 2300 23 2304 23 0759 23 1021   BP: 156/77 156/77 151/80 148/82   BP Location:       Pulse:  (!) 110 78 71   Resp:   16 20   Temp:   98.4 °F (36.9 °C)    TempSrc:       SpO2:  95% 96% 95%   Weight:       Height:          Temperature:   Temp (24hrs), Av.2 °F (36.8 °C), Min:97.9 °F (36.6 °C), Max:98.5 °F (36.9 °C)    Temperature: 98.4 °F (36.9 °C)  Intake & Output:  I/O          0701   0700  0701   0700  0701   0700    P. O. 0 220     Total Intake(mL/kg) 0 (0) 220 (2.1)     Urine (mL/kg/hr) 685 1200 (0.5)     Total Output 685 1200     Net -685 -980            Unmeasured Urine Occurrence  2 x           Weights:   IBW (Ideal Body Weight): 86.8 kg    Body mass index is 28 kg/m². Weight (last 2 days)       Date/Time Weight    23 104 (230)          Physical Exam  Constitutional:       General: He is not in acute distress. Appearance: Normal appearance. He is not toxic-appearing. HENT:      Head: Normocephalic and atraumatic. Right Ear: External ear normal.      Left Ear: External ear normal.      Nose: Nose normal.      Mouth/Throat:      Mouth: Mucous membranes are moist.   Eyes:      Conjunctiva/sclera: Conjunctivae normal.   Cardiovascular:      Rate and Rhythm: Normal rate and regular rhythm. Pulses: Normal pulses. Heart sounds: Normal heart sounds.    Pulmonary:      Effort: Pulmonary effort is normal.      Breath sounds: Normal breath sounds. Abdominal:      General: Bowel sounds are normal.      Palpations: Abdomen is soft. Musculoskeletal:      Right lower leg: No edema. Left lower leg: No edema. Comments: Some erythema on left lower leg. Unable to directly visualize L foot due to wound dressing, but some seepage noted through gauze wrapping. Skin:     General: Skin is warm and dry. Neurological:      Mental Status: He is alert and oriented to person, place, and time. Mental status is at baseline. Cranial Nerves: No cranial nerve deficit. Psychiatric:         Thought Content: Thought content normal.      Comments: Pt is a little anxious about procedure time. LABORATORY DATA     Labs: I have personally reviewed pertinent reports. Results from last 7 days   Lab Units 11/29/23  0614 11/28/23 0618 11/28/23 0013 11/27/23  1810   WBC Thousand/uL 8.42 8.59  --  9.85   HEMOGLOBIN g/dL 13.9 12.9  --  14.3   HEMATOCRIT % 44.5 38.8  --  43.4   PLATELETS Thousands/uL 212 205 198 201   NEUTROS PCT % 63 53  --  64   MONOS PCT % 9 8  --  6   EOS PCT % 3 4  --  3      Results from last 7 days   Lab Units 11/29/23  0614 11/28/23 0618 11/27/23  1810   POTASSIUM mmol/L 4.0 4.0 5.0   CHLORIDE mmol/L 103 104 106   CO2 mmol/L 29 28 23   BUN mg/dL 15 15 20   CREATININE mg/dL 0.89 0.84 0.95   CALCIUM mg/dL 9.4 8.7 9.6   ALK PHOS U/L  --  98 121*   ALT U/L  --  16 16   AST U/L  --  11* 18     Results from last 7 days   Lab Units 11/28/23  0618   MAGNESIUM mg/dL 1.9     Results from last 7 days   Lab Units 11/28/23  0618   PHOSPHORUS mg/dL 3.2      Results from last 7 days   Lab Units 11/28/23 2021 11/28/23  1233 11/28/23 0618 11/28/23 0013   INR   --   --  1.32* 1.29*   PTT seconds 71* 35 65* 32               IMAGING & DIAGNOSTIC TESTING     Radiology Results: I have personally reviewed pertinent reports.   MRI foot/forefoot toes left w wo contrast    Result Date: 11/28/2023  Impression: Findings consistent with definite osteomyelitis involving the fourth metatarsal head and probable early osteomyelitis involving the fourth toe proximal phalanx. Plantar soft tissue ulcer at the level of the fourth MTP joint with associated superficial soft tissue gas and probable necrotizing infection. No evidence of a discrete abscess. The study was marked in [facility] for immediate notification. Workstation performed: [id number]     XR foot 3+ views LEFT    Result Date: 11/28/2023  Impression: There is some demineralization in the region of the head of the fourth metatarsal. Recommend MRI to exclude osteomyelitis. This has been scheduled. There is soft tissue swelling. Workstation performed: [id number]     Other Diagnostic Testing: I have personally reviewed pertinent reports.     ACTIVE MEDICATIONS     Current Facility-Administered Medications   Medication Dose Route Frequency    acetaminophen (TYLENOL) tablet 650 mg  650 mg Oral Q6H PRN    aluminum-magnesium hydroxide-simethicone (MAALOX) oral suspension 30 mL  30 mL Oral Q6H PRN    atorvastatin (LIPITOR) tablet 40 mg  40 mg Oral Daily    ceFAZolin (ANCEF) IVPB (premix in dextrose) 2,000 mg 50 mL  2,000 mg Intravenous Q8H    chlorthalidone tablet 25 mg  25 mg Oral Daily    diltiazem (CARDIZEM) tablet 120 mg  120 mg Oral BID    hydrALAZINE (APRESOLINE) injection 5 mg  5 mg Intravenous Q6H PRN    insulin glargine (LANTUS) subcutaneous injection 30 Units 0.3 mL  30 Units Subcutaneous HS    insulin lispro (HumaLOG) 100 units/mL subcutaneous injection 1-5 Units  1-5 Units Subcutaneous HS    insulin lispro (HumaLOG) 100 units/mL subcutaneous injection 1-6 Units  1-6 Units Subcutaneous TID AC    insulin lispro (HumaLOG) 100 units/mL subcutaneous injection 5 Units  5 Units Subcutaneous TID With Meals    lisinopril (ZESTRIL) tablet 40 mg  40 mg Oral Daily    metoprolol succinate (TOPROL-XL) 24 hr tablet 100 mg  100 mg Oral BID    ondansetron (ZOFRAN) injection 4 mg  4 mg Intravenous Q6H PRN senna (SENOKOT) tablet 8.6 mg  1 tablet Oral Daily    tamsulosin (FLOMAX) capsule 0.4 mg  0.4 mg Oral Daily With Dinner       VTE Pharmacologic Prophylaxis: Heparin  VTE Mechanical Prophylaxis: sequential compression device    Portions of the record may have been created with voice recognition software. Occasional wrong word or "sound a like" substitutions may have occurred due to the inherent limitations of voice recognition software. Read the chart carefully and recognize, using context, where substitutions have occurred.

## 2023-11-29 NOTE — PLAN OF CARE
Problem: SKIN/TISSUE INTEGRITY - ADULT  Goal: Skin Integrity remains intact(Skin Breakdown Prevention)  Description: Assess:  -Perform Monster assessment every   -Clean and moisturize skin every   -Inspect skin when repositioning, toileting, and assisting with ADLS  -Assess under medical devices such as  every   -Assess extremities for adequate circulation and sensation     Bed Management:  -Have minimal linens on bed & keep smooth, unwrinkled  -Change linens as needed when moist or perspiring  -Avoid sitting or lying in one position for more than  hours while in bed  -Keep HOB at degrees     Toileting:  -Offer bedside commode  -Assess for incontinence every   -Use incontinent care products after each incontinent episode such as     Activity:  -Mobilize patient  times a day  -Encourage activity and walks on unit  -Encourage or provide ROM exercises   -Turn and reposition patient every  Hours  -Use appropriate equipment to lift or move patient in bed  -Instruct/ Assist with weight shifting every  when out of bed in chair  -Consider limitation of chair time  hour intervals    Skin Care:  -Avoid use of baby powder, tape, friction and shearing, hot water or constrictive clothing  -Relieve pressure over bony prominences using   -Do not massage red bony areas    Next Steps:  -Teach patient strategies to minimize risks such as    -Consider consults to  interdisciplinary teams such as   Outcome: Progressing  Goal: Incision(s), wounds(s) or drain site(s) healing without S/S of infection  Description: INTERVENTIONS  - Assess and document dressing, incision, wound bed, drain sites and surrounding tissue  - Provide patient and family education  - Perform skin care/dressing changes every   Outcome: Progressing  Goal: Pressure injury heals and does not worsen  Description: Interventions:  - Implement low air loss mattress or specialty surface (Criteria met)  - Apply silicone foam dressing  - Instruct/assist with weight shifting every  minutes when in chair   - Limit chair time to  hour intervals  - Use special pressure reducing interventions such as  when in chair   - Apply fecal or urinary incontinence containment device   - Perform passive or active ROM every   - Turn and reposition patient & offload bony prominences every  hours   - Utilize friction reducing device or surface for transfers   - Consider consults to  interdisciplinary teams such as   - Use incontinent care products after each incontinent episode such as   - Consider nutrition services referral as needed  Outcome: Progressing     Problem: INFECTION - ADULT  Goal: Absence or prevention of progression during hospitalization  Description: INTERVENTIONS:  - Assess and monitor for signs and symptoms of infection  - Monitor lab/diagnostic results  - Monitor all insertion sites, i.e. indwelling lines, tubes, and drains  - Monitor endotracheal if appropriate and nasal secretions for changes in amount and color  - [place] appropriate cooling/warming therapies per order  - Administer medications as ordered  - Instruct and encourage patient and family to use good hand hygiene technique  - Identify and instruct in appropriate isolation precautions for identified infection/condition  Outcome: Progressing  Goal: Absence of fever/infection during neutropenic period  Description: INTERVENTIONS:  - Monitor WBC    Outcome: Progressing

## 2023-11-29 NOTE — OP NOTE
OPERATIVE REPORT - Podiatry  PATIENT NAME: Rea Cabot. :  1950  MRN: [id number]  Pt Location: BE OR ROOM 08    SURGERY DATE: 2023    Surgeon(s) and Role:     * Prabha Baez DPM - Primary     * Sho Kim DPM - Assisting    Pre-op Diagnosis:  Puncture wound of left foot with foreign body, subsequent encounter [S91.342D]    Post-Op Diagnosis Codes:     * Puncture wound of left foot with foreign body, subsequent encounter [S91.342D]    Procedure(s) (LRB):  LEFT FOURTH RAY RESECTION FOOT (Left)    Specimen(s):  ID Type Source Tests Collected by Time Destination   1 : Fourth toe left foot Tissue Toe, Left TISSUE EXAM Prabha Baez DPM 2023 1621        Estimated Blood Loss:   Minimal    Drains:  * No LDAs found *    Anesthesia Type:   Choice with 15 ml of 1% Lidocaine and 0.5% Bupivacaine in a 1:1 mixture    Hemostasis:  Pneumatic ankle tourniquet set at 250 mmHg for 42 mins  Direct compression    Materials:  * No implants in log *  3-0 Nylon    Injectables:  None    Operative Findings:  - Removal of nonviable soft tissue and bone. Surgical cure believed to be obtained  - No sinus tracts or pockets of purulence noted  - Primary closure achieved   - Adequate bleeding noted to soft tissue and bone     Complications:   None    Procedure and Technique:     Under mild sedation, the patient was brought into the operating room and placed on the operating room table in the supine position. IV sedation was achieved by anesthesia team and a universal timeout was performed where all parties are in agreement of correct patient, correct procedure and correct site. A pneumatic tourniquet was then placed over the patient's left lower extremity with ample padding. A local block was performed consisting of 15 ml of 1% Lidocaine and 0.5% Bupivacaine in a 1:1 mixture. The foot was then prepped and draped in the usual aseptic manner.  An esmarch bandage was used to exsangunate the foot and the pneumatic tourniquet was then inflated to 250 mmHg. Attention was then directed to the left fourth interdigital space wound. The wound measured 8.5 cm x 1.0 cm x 1.5 cm prior to the procedure. Utilizing a sterile #15 blade, a racquet type incision was made around the fourth digit that was carried plantarly to excisionally ellipse out the prior wound to the level of subcutaneous tissue. The incision was carried deep straight to bone. Soft tissue structures were then reflected off the fourth proximal phalanx. The digit was then disarticulated at the level of the metatarsal phalangeal joint. The severed digit was then passed off to the back table. Soft tissue was then dissected off of the fourth metatarsal head and neck. A sagittal saw was then utilized to make a transverse cut in the fourth metatarsal shaft. The capital fragment was then also passed off to the back table. Non-viable soft tissue was then excisionally debrided using a rongeur and scissors. It was noted that all tissue margins were bleeding and viable. No deep sinus tracts or areas of purulence were visualized. The remaining bone on the proximal aspect of the cut was noted to be of hard and viable quality. Any remaining tendinous structures were identified and severed proximally to remove any nidus of infection. The surgical incision was irrigated with copious amounts of normal sterile saline using a pulse lavage. Skin edges were reapproximated and closure was obtained utilizing 3-0 Nylon. The foot was then cleansed and dried. The incision site was dressed with adaptic, 4x4 gauze. This was then covered with a Kerlix and an ACE wrap. The tourniquet was deflated at approximately 42 min and normal hyperemic response was noted to all digits. The patient tolerated the procedure and anesthesia well without immediate complications and transferred to PACU with vital signs stable.      Patient is to remain strict nonweightbearing to the left lower extremity at this time    As with many limb salvage procedures, we contemplate the possibility of performing further stages to this procedure. Procedures may include debridements, delayed closure, plastic surgery techniques, or more proximal amputations. This procedure may be considered part of a multi-staged limb salvage treatment plan. Dr. Magdy Tay was present during the entire procedure and participated in all key aspects. SIGNATURE: Shawn Millan  DATE: November 29, 2023  TIME: 5:29 PM      Portions of the record may have been created with voice recognition software. Occasional wrong word or "sound a like" substitutions may have occurred due to the inherent limitations of voice recognition software. Read the chart carefully and recognize, using context, where substitutions have occurred.

## 2023-11-30 LAB
ANION GAP SERPL CALCULATED.3IONS-SCNC: 8 MMOL/L
BACTERIA SPEC ANAEROBE CULT: ABNORMAL
BACTERIA WND AEROBE CULT: ABNORMAL
BACTERIA WND AEROBE CULT: ABNORMAL
BASOPHILS # BLD AUTO: 0.04 THOUSANDS/ÂΜL (ref 0–0.1)
BASOPHILS NFR BLD AUTO: 0 % (ref 0–1)
BUN SERPL-MCNC: 18 MG/DL (ref 5–25)
CALCIUM SERPL-MCNC: 9 MG/DL (ref 8.4–10.2)
CHLORIDE SERPL-SCNC: 103 MMOL/L (ref 96–108)
CO2 SERPL-SCNC: 28 MMOL/L (ref 21–32)
CREAT SERPL-MCNC: 0.95 MG/DL (ref 0.6–1.3)
EOSINOPHIL # BLD AUTO: 0.25 THOUSAND/ÂΜL (ref 0–0.61)
EOSINOPHIL NFR BLD AUTO: 3 % (ref 0–6)
ERYTHROCYTE [DISTWIDTH] IN BLOOD BY AUTOMATED COUNT: 14.1 % (ref 11.6–15.1)
GFR SERPL CREATININE-BSD FRML MDRD: 80 ML/MIN/1.73SQ M
GLUCOSE SERPL-MCNC: 160 MG/DL (ref 65–140)
GLUCOSE SERPL-MCNC: 180 MG/DL (ref 65–140)
GLUCOSE SERPL-MCNC: 89 MG/DL (ref 65–140)
GLUCOSE SERPL-MCNC: 91 MG/DL (ref 65–140)
GLUCOSE SERPL-MCNC: 94 MG/DL (ref 65–140)
GRAM STN SPEC: ABNORMAL
GRAM STN SPEC: ABNORMAL
HCT VFR BLD AUTO: 41.1 % (ref 36.5–49.3)
HGB BLD-MCNC: 13.4 G/DL (ref 12–17)
IMM GRANULOCYTES # BLD AUTO: 0.03 THOUSAND/UL (ref 0–0.2)
IMM GRANULOCYTES NFR BLD AUTO: 0 % (ref 0–2)
LYMPHOCYTES # BLD AUTO: 1.7 THOUSANDS/ÂΜL (ref 0.6–4.47)
LYMPHOCYTES NFR BLD AUTO: 18 % (ref 14–44)
MCH RBC QN AUTO: 31.2 PG (ref 26.8–34.3)
MCHC RBC AUTO-ENTMCNC: 32.6 G/DL (ref 31.4–37.4)
MCV RBC AUTO: 96 FL (ref 82–98)
MONOCYTES # BLD AUTO: 0.83 THOUSAND/ÂΜL (ref 0.17–1.22)
MONOCYTES NFR BLD AUTO: 9 % (ref 4–12)
NEUTROPHILS # BLD AUTO: 6.79 THOUSANDS/ÂΜL (ref 1.85–7.62)
NEUTS SEG NFR BLD AUTO: 70 % (ref 43–75)
NRBC BLD AUTO-RTO: 0 /100 WBCS
PLATELET # BLD AUTO: 203 THOUSANDS/UL (ref 149–390)
PMV BLD AUTO: 10.7 FL (ref 8.9–12.7)
POTASSIUM SERPL-SCNC: 3.8 MMOL/L (ref 3.5–5.3)
RBC # BLD AUTO: 4.29 MILLION/UL (ref 3.88–5.62)
SODIUM SERPL-SCNC: 139 MMOL/L (ref 135–147)
WBC # BLD AUTO: 9.64 THOUSAND/UL (ref 4.31–10.16)

## 2023-11-30 PROCEDURE — 82948 REAGENT STRIP/BLOOD GLUCOSE: CPT

## 2023-11-30 PROCEDURE — 85025 COMPLETE CBC W/AUTO DIFF WBC: CPT

## 2023-11-30 PROCEDURE — 99223 1ST HOSP IP/OBS HIGH 75: CPT | Performed by: INTERNAL MEDICINE

## 2023-11-30 PROCEDURE — NC001 PR NO CHARGE: Performed by: PODIATRIST

## 2023-11-30 PROCEDURE — 99233 SBSQ HOSP IP/OBS HIGH 50: CPT | Performed by: INTERNAL MEDICINE

## 2023-11-30 PROCEDURE — 80048 BASIC METABOLIC PNL TOTAL CA: CPT

## 2023-11-30 RX ADMIN — INSULIN LISPRO 5 UNITS: 100 INJECTION, SOLUTION INTRAVENOUS; SUBCUTANEOUS at 08:15

## 2023-11-30 RX ADMIN — CEFAZOLIN SODIUM 2000 MG: 2 SOLUTION INTRAVENOUS at 03:05

## 2023-11-30 RX ADMIN — DILTIAZEM HYDROCHLORIDE 120 MG: 60 TABLET ORAL at 21:19

## 2023-11-30 RX ADMIN — CEFAZOLIN SODIUM 2000 MG: 2 SOLUTION INTRAVENOUS at 08:15

## 2023-11-30 RX ADMIN — SODIUM CHLORIDE 3 G: 9 INJECTION, SOLUTION INTRAVENOUS at 20:48

## 2023-11-30 RX ADMIN — ATORVASTATIN CALCIUM 40 MG: 40 TABLET, FILM COATED ORAL at 08:13

## 2023-11-30 RX ADMIN — INSULIN LISPRO 5 UNITS: 100 INJECTION, SOLUTION INTRAVENOUS; SUBCUTANEOUS at 17:08

## 2023-11-30 RX ADMIN — METOPROLOL SUCCINATE 100 MG: 100 TABLET, EXTENDED RELEASE ORAL at 08:13

## 2023-11-30 RX ADMIN — INSULIN LISPRO 1 UNITS: 100 INJECTION, SOLUTION INTRAVENOUS; SUBCUTANEOUS at 21:19

## 2023-11-30 RX ADMIN — APIXABAN 5 MG: 5 TABLET, FILM COATED ORAL at 18:37

## 2023-11-30 RX ADMIN — SODIUM CHLORIDE 3 G: 9 INJECTION, SOLUTION INTRAVENOUS at 14:22

## 2023-11-30 RX ADMIN — INSULIN LISPRO 5 UNITS: 100 INJECTION, SOLUTION INTRAVENOUS; SUBCUTANEOUS at 11:23

## 2023-11-30 RX ADMIN — INSULIN GLARGINE 30 UNITS: 100 INJECTION, SOLUTION SUBCUTANEOUS at 21:18

## 2023-11-30 RX ADMIN — INSULIN LISPRO 1 UNITS: 100 INJECTION, SOLUTION INTRAVENOUS; SUBCUTANEOUS at 11:22

## 2023-11-30 RX ADMIN — LISINOPRIL 40 MG: 20 TABLET ORAL at 08:13

## 2023-11-30 RX ADMIN — METOPROLOL SUCCINATE 100 MG: 100 TABLET, EXTENDED RELEASE ORAL at 21:18

## 2023-11-30 RX ADMIN — CHLORTHALIDONE 25 MG: 25 TABLET ORAL at 08:14

## 2023-11-30 RX ADMIN — DILTIAZEM HYDROCHLORIDE 120 MG: 60 TABLET ORAL at 08:14

## 2023-11-30 RX ADMIN — APIXABAN 5 MG: 5 TABLET, FILM COATED ORAL at 11:22

## 2023-11-30 RX ADMIN — TAMSULOSIN HYDROCHLORIDE 0.4 MG: 0.4 CAPSULE ORAL at 17:06

## 2023-11-30 NOTE — PROGRESS NOTES
Patient:    MRN:  [id number]    Aidin Request ID:  [id number]    Level of care reserved:  [address]    Partner Reserved:  JOE VALENCIA [facility], [place],  [facility] [phone]    Clinical needs requested:    Geography searched:  [phone]    Start of Service:    Request sent:  9:29am EST on 11/30/2023 by Oanh Alarcon    Partner reserved:  9:47am EST on 11/30/2023 by Oanh Alarcon    Choice list shared:  9:47am EST on 11/30/2023 by Oanh Alarcon

## 2023-11-30 NOTE — PLAN OF CARE
Problem: PAIN - ADULT  Goal: Verbalizes/displays adequate comfort level or baseline comfort level  Description: Interventions:  - Encourage patient to monitor pain and request assistance  - Assess pain using appropriate pain scale  - Administer analgesics based on type and severity of pain and evaluate response  - Implement non-pharmacological measures as appropriate and evaluate response  - Consider cultural and social influences on pain and pain management  - Notify physician/advanced practitioner if interventions unsuccessful or patient reports new pain  Outcome: Progressing     Problem: INFECTION - ADULT  Goal: Absence or prevention of progression during hospitalization  Description: INTERVENTIONS:  - Assess and monitor for signs and symptoms of infection  - Monitor lab/diagnostic results  - Monitor all insertion sites, i.e. indwelling lines, tubes, and drains  - Monitor endotracheal if appropriate and nasal secretions for changes in amount and color  - [place] appropriate cooling/warming therapies per order  - Administer medications as ordered  - Instruct and encourage patient and family to use good hand hygiene technique  - Identify and instruct in appropriate isolation precautions for identified infection/condition  Outcome: Progressing  Goal: Absence of fever/infection during neutropenic period  Description: INTERVENTIONS:  - Monitor WBC    Outcome: Progressing     Problem: SAFETY ADULT  Goal: Patient will remain free of falls  Description: INTERVENTIONS:  - Educate patient/family on patient safety including physical limitations  - Instruct patient to call for assistance with activity   - Consult OT/PT to assist with strengthening/mobility   - Keep Call bell within reach  - Keep bed low and locked with side rails adjusted as appropriate  - Keep care items and personal belongings within reach  - Initiate and maintain comfort rounds  - Make Fall Risk Sign visible to staff  - Offer Toileting every  Hours, in advance of need  - Initiate/Maintain alarm  - Obtain necessary fall risk management equipment:   - Apply yellow socks and bracelet for high fall risk patients  - Consider moving patient to room near nurses station  Outcome: Progressing  Goal: Maintain or return to baseline ADL function  Description: INTERVENTIONS:  -  Assess patient's ability to carry out ADLs; assess patient's baseline for ADL function and identify physical deficits which impact ability to perform ADLs (bathing, care of mouth/teeth, toileting, grooming, dressing, etc.)  - Assess/evaluate cause of self-care deficits   - Assess range of motion  - Assess patient's mobility; develop plan if impaired  - Assess patient's need for assistive devices and provide as appropriate  - Encourage maximum independence but intervene and supervise when necessary  - Involve family in performance of ADLs  - Assess for home care needs following discharge   - Consider OT consult to assist with ADL evaluation and planning for discharge  - Provide patient education as appropriate  Outcome: Progressing  Goal: Maintains/Returns to pre admission functional level  Description: INTERVENTIONS:  - Perform AM-PAC 6 Click Basic Mobility/ Daily Activity assessment daily.  - Set and communicate daily mobility goal to care team and patient/family/caregiver. - Collaborate with rehabilitation services on mobility goals if consulted  - Perform Range of Motion  times a day. - Reposition patient every  hours.   - Dangle patient  times a day  - Stand patient  times a day  - Ambulate patient  times a day  - Out of bed to chair  times a day   - Out of bed for meals times a day  - Out of bed for toileting  - Record patient progress and toleration of activity level   Outcome: Progressing     Problem: DISCHARGE PLANNING  Goal: Discharge to home or other facility with appropriate resources  Description: INTERVENTIONS:  - Identify barriers to discharge w/patient and caregiver  - Arrange for needed discharge resources and transportation as appropriate  - Identify discharge learning needs (meds, wound care, etc.)  - Arrange for interpretive services to assist at discharge as needed  - Refer to Case Management Department for coordinating discharge planning if the patient needs post-hospital services based on physician/advanced practitioner order or complex needs related to functional status, cognitive ability, or social support system  Outcome: Progressing     Problem: Knowledge Deficit  Goal: Patient/family/caregiver demonstrates understanding of disease process, treatment plan, medications, and discharge instructions  Description: Complete learning assessment and assess knowledge base.   Interventions:  - Provide teaching at level of understanding  - Provide teaching via preferred learning methods  Outcome: Progressing     Problem: SKIN/TISSUE INTEGRITY - ADULT  Goal: Skin Integrity remains intact(Skin Breakdown Prevention)  Description: Assess:  -Perform Monster assessment every   -Clean and moisturize skin every   -Inspect skin when repositioning, toileting, and assisting with ADLS  -Assess under medical devices such as  every   -Assess extremities for adequate circulation and sensation     Bed Management:  -Have minimal linens on bed & keep smooth, unwrinkled  -Change linens as needed when moist or perspiring  -Avoid sitting or lying in one position for more than  hours while in bed  -Keep HOB at degrees     Toileting:  -Offer bedside commode  -Assess for incontinence every   -Use incontinent care products after each incontinent episode such as     Activity:  -Mobilize patient  times a day  -Encourage activity and walks on unit  -Encourage or provide ROM exercises   -Turn and reposition patient every  Hours  -Use appropriate equipment to lift or move patient in bed  -Instruct/ Assist with weight shifting every  when out of bed in chair  -Consider limitation of chair time  hour intervals    Skin Care:  -Avoid use of baby powder, tape, friction and shearing, hot water or constrictive clothing  -Relieve pressure over bony prominences using   -Do not massage red bony areas    Next Steps:  -Teach patient strategies to minimize risks such as    -Consider consults to  interdisciplinary teams such as   Outcome: Progressing  Goal: Incision(s), wounds(s) or drain site(s) healing without S/S of infection  Description: INTERVENTIONS  - Assess and document dressing, incision, wound bed, drain sites and surrounding tissue  - Provide patient and family education  - Perform skin care/dressing changes every   Outcome: Progressing  Goal: Pressure injury heals and does not worsen  Description: Interventions:  - Implement low air loss mattress or specialty surface (Criteria met)  - Apply silicone foam dressing  - Instruct/assist with weight shifting every  minutes when in chair   - Limit chair time to hour intervals  - Use special pressure reducing interventions such as  when in chair   - Apply fecal or urinary incontinence containment device   - Perform passive or active ROM every   - Turn and reposition patient & offload bony prominences every  hours   - Utilize friction reducing device or surface for transfers   - Consider consults to  interdisciplinary teams such as   - Use incontinent care products after each incontinent episode such as   - Consider nutrition services referral as needed  Outcome: Progressing

## 2023-11-30 NOTE — CASE MANAGEMENT
Case Management Discharge Planning Note    Patient name Олег Tabares   Location [facility] 912/[facility] 912-01 MRN [id number]  : 1950 Date 2023       Current Admission Date: 2023  Current Admission Diagnosis:Osteomyelitis of foot, acute [facility])   Patient Active Problem List    Diagnosis Date Noted    Puncture wound of left foot with foreign body, subsequent encounter 2023    Cellulitis of left lower extremity 10/24/2023    Corns and callosities 2023    Tinea unguium 2023    Vasomotor rhinitis 2022    Gait abnormality 10/25/2022    COVID-19 10/18/2021    S/P R ALT flap to right foot 2021    Anxiety 2021    Osteomyelitis of foot, acute ([address]) 2021    Sleep disturbance 2021    S/P transmetatarsal amputation of foot, right ([address]) 2021    Aftercare for amputation stump 03/10/2021    Ambulatory dysfunction 03/10/2021    Cellulitis of extremity 2021    Microalbuminuria 2020    Essential hypertension 2019    Benign prostatic hyperplasia with lower urinary tract symptoms 2019    Type 2 diabetes mellitus with diabetic neuropathy ([address]) 2018    Atrial fibrillation with RVR ([address]) 2018    Right middle lobe pulmonary nodule 2018    Hyperlipidemia 05/15/2014      LOS (days): 2  Geometric Mean LOS (GMLOS) (days): 3.00  Days to GMLOS:1.2     OBJECTIVE:  Risk of Unplanned Readmission Score: 13.29         Current admission status: Inpatient   Preferred Pharmacy:   [address]  [address] [place] [address]  Phone: [phone] Fax: [address] [place] [address]  Phone: [phone] Fax: [phone]    [id number] [place] [facility]  [address] [facility] [address]  Phone: [phone] Fax: [phone] [place], [address] [facility] [address] [address]  Phone: [phone] Fax: [phone]    Primary Care Provider: You Vang MD    Primary Insurance: [facility]  Secondary Insurance:     DISCHARGE DETAILS:    Discharge planning discussed with[de-identified] Pt  Freedom of Choice: Yes  Comments - Freedom of Choice: Pt resume with [facility] on discharge, previously had [address] prior to admission  CM contacted family/caregiver?: No- see comments (Pt alert and oriented)  Were Treatment Team discharge recommendations reviewed with patient/caregiver?: Yes  Did patient/caregiver verbalize understanding of patient care needs?: Yes  Were patient/caregiver advised of the risks associated with not following Treatment Team discharge recommendations?: Yes         Requested [facility]         Is the patient interested in [address] at discharge?: Yes  [facility] requested[de-identified] [facility] Name[de-identified] [place] Provider[de-identified] PCP  Home Health Services Needed[de-identified] Wound/Ostomy Care  Homebound Criteria Met[de-identified] Uses an Assist Device (i.e. cane, walker, etc)  Supporting Clincal Findings[de-identified] Fatigues Easliy in Short Distances         Other Referral/Resources/Interventions Provided:  Interventions: [facility]  Referral Comments: CM completed resumption of care referral for [facility]         Treatment Team Recommendation: Home with [facility]  Discharge Destination Plan[de-identified] Home with [address] [facility]

## 2023-11-30 NOTE — PLAN OF CARE
Problem: PAIN - ADULT  Goal: Verbalizes/displays adequate comfort level or baseline comfort level  Description: Interventions:  - Encourage patient to monitor pain and request assistance  - Assess pain using appropriate pain scale  - Administer analgesics based on type and severity of pain and evaluate response  - Implement non-pharmacological measures as appropriate and evaluate response  - Consider cultural and social influences on pain and pain management  - Notify physician/advanced practitioner if interventions unsuccessful or patient reports new pain  Outcome: Progressing     Problem: INFECTION - ADULT  Goal: Absence or prevention of progression during hospitalization  Description: INTERVENTIONS:  - Assess and monitor for signs and symptoms of infection  - Monitor lab/diagnostic results  - Monitor all insertion sites, i.e. indwelling lines, tubes, and drains  - Monitor endotracheal if appropriate and nasal secretions for changes in amount and color  - [place] appropriate cooling/warming therapies per order  - Administer medications as ordered  - Instruct and encourage patient and family to use good hand hygiene technique  - Identify and instruct in appropriate isolation precautions for identified infection/condition  Outcome: Progressing

## 2023-11-30 NOTE — CONSULTS
Consultation - Infectious Disease   Jeronimo Pino. 72 y.o. male MRN: [id number]  Unit/Bed#: [facility] 459-56 Encounter: [id number]      IMPRESSION & RECOMMENDATIONS:   Impression/Recommendations:  1. Left foot dehiscence c/b cellulitis and underlying osteomyelitis/septic arthritis. Now status post left fourth ray resection 11/29 with presumed surgical cure. No OR cultures but preoperative cultures isolated MSSA and Peptoniphilus. Patient remains afebrile and clinically stable. Admission blood cultures are negative.    -Change cefazolin to IV Unasyn  -Podiatry follow-up ongoing  -On discharge, anticipate transition to oral Augmentin to complete 7-day postoperative course, through 11/6.    2.  Left foot puncture wound infection status post I&D 10/27/2023 followed by [facility] 10/30/2023. Wound cultures at that time isolated MSSA. Now complicated by #1. Plan as above. 3.  DM 2, with long-term insulin use and neuropathy. Risk factor for poor wound healing and infection. 4.  Prior right TMA. Antibiotics:  Cefazolin 3  POD1      I personally reviewed prior hospitalization records. I discussed above plan with patient and with primary service. Thank you for this consultation. We will follow along with you. HISTORY OF PRESENT ILLNESS:  Reason for Consult: Foot cellulitis/osteomyelitis    HPI: Jeronimo Joe is a 72 y.o. male with DM, neuropathy, prior TMA complicated by right foot cellulitis and osteomyelitis with hardware infection in April 2021 for which patient underwent hardware removal and completed prolonged course of IV antibiotic, more recent hospitalization in October 2023 secondary to left foot puncture wound with infection status post I&D 10/27 followed by delayed primary closure on 10/30. Wound cultures at that time isolated MSSA. Patient now most recently presented on 11/27 due to left foot wound dehiscence with development of cellulitis. Patient was started on cefazolin.   MRI showed osteomyelitis involving fourth metatarsal head and probable early osteomyelitis involving fourth toe proximal phalanx, as well as concern for necrotizing infection to level of fourth MTP joint. On 11/29, patient underwent left fourth ray resection with presumed surgical cure. No sinus tracts or pockets of purulence noted. Preoperative cultures grew MSSA and Peptoniphilus. Patient is feeling better postoperatively. REVIEW OF SYSTEMS:  A complete system-based review of systems is otherwise negative.     PAST MEDICAL HISTORY:  Past Medical History:   Diagnosis Date    Arthritis     Atrial fibrillation ([address])     Diagnosed 11/2018    Benign prostate hyperplasia 04/2002    Cellulitis     right lower leg     Colon polyp 2006    Diabetes mellitus ([address])     Hearing aid worn     bilat    Hearing loss     90% loss left ear and 40% right ear    History of clubfoot     "since birth"    History of pneumonia     History of TIA (transient ischemic attack) 04/25/2017 11/30/18 pt denies    Hyperlipidemia 5/15/2014    Hypertension     Infectious viral hepatitis     "cant remember type"    Irregular heart beat     Afib    Neuropathy     both feet    Osteomyelitis ([address])     right great toe    Pneumonia     Right middle lobe pulmonary nodule 11/6/2018    Seasickness     Teeth missing     Type 2 diabetes mellitus with diabetic neuropathy ([address]) 11/6/2018    Wears glasses     reading    Wound, open     bottom of right foot     Past Surgical History:   Procedure Laterality Date    BUNIONECTOMY Right 12/4/2018    Procedure: 5TH METATARSAL BONE PARTIAL RESECTION, FULL THICKNESS DEBRIDEMENT OF DIABETIC ULCER;  Surgeon: Yesenia Edgar DPM;  [facility];  Service: Podiatry    CLOSURE DELAYED PRIMARY Left 10/30/2023    Procedure: CLOSURE DELAYED PRIMARY left foot;  Surgeon: Gladies Cabot, DPM;  Location:  MAIN OR;  Service: Podiatry    CLUB FOOT RELEASE Bilateral     COLONOSCOPY      COMPLEX WOUND CLOSURE TO EXTREMITY 4/27/2021    Procedure: COMPLEX WOUND CLOSURE TO EXTREMITY: RIGHT FOOT;  Surgeon: Milagros Mckeon MD;  [facility];  Service: Plastics    EXTERNAL FIXATOR APPLICATION Right 1/47/7083    Procedure: Application multiplane external fixation;  Surgeon: Gilles Gasca DPM;  [facility];  Service: Podiatry    FOOT HARDWARE REMOVAL Right 2/17/2021    Procedure: REMOVAL EXTERNAL FIXATOR;  Surgeon: Gilles Gasca DPM;  [facility];  Service: Podiatry    FOREIGN BODY REMOVAL Right 4/27/2021    Procedure: REMOVAL FOREIGN BODY EXTREMITY: RIGHT FOOT;  Surgeon: Milagros Mckeon MD;  [facility];  Service: Plastics    INCISION AND DRAINAGE OF WOUND Right 2/17/2021    Procedure: INCISION AND DRAINAGE (I&D) EXTREMITY;  Surgeon: Gilles Gasca DPM;  [facility];  Service: Podiatry    INCISION AND DRAINAGE OF WOUND Left 10/26/2023    Procedure: INCISION AND DRAINAGE (I&D) EXTREMITY; WASHOUT; REMOVAL OF FOREIGN BODY;  Surgeon: Danyell Thrasher DPM;  [facility];  Service: Podiatry    ORIF FOOT FRACTURE Right 3/4/2021    Procedure: VAC PLACEMENT; SCREW FIXATION RIGHT FOOT FUSION;  Surgeon: Gilles Gasca DPM;  [facility];  Service: Podiatry    PA AMPUTATION FOOT TRANSMETARSAL Right 2/22/2021    Procedure: AMPUTATION TRANSMETATARSAL (TMA), REMOVAL NAIL IM T2 ICF HARDWARE;  Surgeon: Gilles Gasca DPM;  [facility];  Service: Podiatry    PA AMPUTATION METATARSAL W/TOE SINGLE Left 11/29/2023    Procedure: LEFT FOURTH RAY RESECTION FOOT;  Surgeon: Gilles Gasca DPM;  [facility];  Service: Podiatry    PA ARTHRD MIDTARSL/TARS MLT/TRANSVRS W/OSTEOT Right 2/12/2021    Procedure: foot ARTHRODESIS/FUSION;  Surgeon: Gilles Gasca DPM;  [facility];  Service: Podiatry    PA DEBRIDEMENT BONE MUSCLE &/FASCIA 20 SQ CM/< Right 12/2/2021    Procedure: DEBRIDEMENT OF TARSAL BONES WITH ANITBIODIC BEADS;  Surgeon: Gilles Gasca DPM; [facility];  Service: Podiatry    GA LNGTH/SHRT TENDON LEG/ANKLE 1 TENDON SPX Right 2/12/2021    Procedure: LENGTHEN TIBIAL TENDON, TRANS HEEL CORD;  Surgeon: Margot Harding DPM;  [facility];  Service: Podiatry    GA MUSC MYOCUTANEOUS/FASCIOCUTANEOUS FLAP TRUNK Right 4/12/2021    Procedure: RECONSTRUCTION MICROSURGICAL W/ FREE FLAP;  Surgeon: Camron Rivera MD;  [facility];  Service: Plastics    GA MUSC MYOCUTANEOUS/FASCIOCUTANEOUS FLAP TRUNK Right 4/12/2021    Procedure: TAKEBACK RECONSTRUCTION MICROSURGICAL W/ FREE FLAP;  Surgeon: Camron Rivera MD;  [facility];  Service: Plastics    GA MUSC MYOCUTANEOUS/FASCIOCUTANEOUS FLAP TRUNK Right 4/13/2021    Procedure: RECONSTRUCTION MICROSURGICAL W/ FREE FLAP, RE EXPLORATION, MICRO VASCULAR REVISION;  Surgeon: Camron Rivera MD;  [facility];  Service: Plastics    GA OSTC PRTL EXOSTC/CONDYLC METAR HEAD Right 12/29/2020    Procedure: EXCISION EXOSTOSIS;  Surgeon: Margot Harding DPM;  [facility];  Service: Podiatry    GA SECONDARY CLOSURE SURG WOUND/DEHSN EXTSV/COMPLIC Right 55/14/2206    Procedure: PRIMARY DELAYED CLOSURE;  Surgeon: Margot Harding DPM;  [facility];  Service: Podiatry    TOE AMPUTATION Right     partial great toe    TONSILLECTOMY      VAC DRESSING APPLICATION Right 1/5/8236    Procedure: APPLICATION VAC DRESSING EXTREMITY;  Surgeon: Camron Rivera MD;  [facility];  Service: [address] DEBRIDEMENT Right 3/1/2021    Procedure: DEBRIDEMENT LOWER EXTREMITY [facility] OUT); Surgeon: Camron Rivera MD;  [facility];  Service: Plastics    WOUND DEBRIDEMENT Right 4/7/2021    Procedure: DEBRIDEMENT RIGHT FOOT;  Surgeon: Camron Rivera MD;  [facility];  Service: Plastics    WOUND DEBRIDEMENT Right 4/12/2021    Procedure: DEBRIDEMENT LOWER EXTREMITY [facility] OUT);   Surgeon: Alex García Latanya Rae MD;  [facility];  Service: Plastics    WOUND DEBRIDEMENT Right 2021    Procedure: DEBRIDEMENT LOWER EXTREMITY [facility] OUT): RIGHT FOOT;  Surgeon: Sarita May MD;  [facility];  Service: Plastics       FAMILY HISTORY:  Non-contributory    SOCIAL HISTORY:  Social History     Substance and Sexual Activity   Alcohol Use Yes    Comment: occasionally     Social History     Substance and Sexual Activity   Drug Use Not Currently     Social History     Tobacco Use   Smoking Status Former    Types: Cigarettes    Quit date: 1988    Years since quittin.0   Smokeless Tobacco Former   Tobacco Comments    exposure to passive smoke       ALLERGIES:  Allergies   Allergen Reactions    Simvastatin Myalgia    Itraconazole Other (See Comments)     Pt denies knowledge of allergy. Ivy Angles MEDICATIONS:  All current active medications have been reviewed. PHYSICAL EXAM:  Vitals:  Temp:  [97.5 °F (36.4 °C)-99.8 °F (37.7 °C)] 99.8 °F (37.7 °C)  HR:  [64-81] 81  Resp:  [12-18] 16  BP: (119-160)/(77-99) 146/77  SpO2:  [96 %-98 %] 98 %  Temp (24hrs), Av.8 °F (37.1 °C), Min:97.5 °F (36.4 °C), Max:99.8 °F (37.7 °C)  Current: Temperature: 99.8 °F (37.7 °C)     Physical Exam:  General:  Well-nourished, well-developed, in no acute distress  Eyes:  Conjunctive clear with no hemorrhages or effusions  Oropharynx:  No ulcers, no lesions  Neck:  Supple, trachea midline  Lungs:  Clear to auscultation bilaterally, no accessory muscle use  Cardiac:  Regular rate and rhythm  Abdomen:  Soft, non-tender, non-distended  Extremities:  No peripheral cyanosis, clubbing, or edema  Skin: Foot dressing is intact  Neurological:  Moves all four extremities spontaneously    LABS, IMAGING, & OTHER STUDIES:  Lab Results:  I have personally reviewed pertinent labs.   Results from last 7 days   Lab Units 23  0526 23  0614 23  0618 23  1810   POTASSIUM mmol/L 3.8 4.0 4.0 5.0   CHLORIDE mmol/L 103 103 104 106   CO2 mmol/L 28 29 28 23   BUN mg/dL 18 15 15 20   CREATININE mg/dL 0.95 0.89 0.84 0.95   EGFR ml/min/1.73sq m 80 86 88 80   CALCIUM mg/dL 9.0 9.4 8.7 9.6   AST U/L  --   --  11* 18   ALT U/L  --   --  16 16   ALK PHOS U/L  --   --  98 121*     Results from last 7 days   Lab Units 11/30/23  0526 11/29/23  0614 11/28/23  0618   WBC Thousand/uL 9.64 8.42 8.59   HEMOGLOBIN g/dL 13.4 13.9 12.9   PLATELETS Thousands/uL 203 212 205     Results from last 7 days   Lab Units 11/27/23  2144 11/27/23 2050   BLOOD CULTURE  No Growth at 48 hrs. No Growth at 48 hrs.  --    GRAM STAIN RESULT   --  1+ Polys*  1+ Gram positive cocci in pairs*   WOUND CULTURE   --  2+ Growth of Staphylococcus aureus*  2+ Growth of         Imaging Studies:   I have personally reviewed pertinent imaging study reports and images in PACS. MRI of the left foot shows definitive osteomyelitis involving fourth metatarsal head and probable early osteomyelitis involving fourth toe proximal phalanx. Probable soft tissue ulcer at the level of fourth MTP joint with associated superficial soft tissue gas and probable necrotizing infection. No discrete abscess. EKG, Pathology, and Other Studies:   I have personally reviewed pertinent reports.

## 2023-11-30 NOTE — PROGRESS NOTES
[facility] Podiatry - Progress Note  Patient: Bryanna Huitron 72 y.o. male   MRN: [id number]  PCP: Latrice Bowden MD  Unit/Bed#: [facility] 408-05 Encounter: [id number]  Date Of Visit: 23    ASSESSMENT:    Bryanna Huitron is a 72 y.o. male with:    Left fourth interdigital space wound  S/p left foot incision and drainage (DOS: 10/27/23)  S/p left foot delayed primary closure (DOS: 10/30/23)  S/p left partial 4th ray resection (DOS: 23)  Left dorsal medial second toe wound  Type 2 diabetes mellitus  Essential hypertension  Atrial fibrillation  History of right transmetatarsal amputation with free flap    PLAN:    POD1 Left partial 4th ray resection (DOS: 23). Post-surgical dressing located on surgical site and affected extremity were left intact today. Will plan for first complete post-surgical dressing change tomorrow   Vitals signs stable. Patient afebrile with no leukocytosis. No erythema, edema, or lymphangitis noted either proximal or distal to the dressings. Pain is well controlled. Continue current pain management regimen. Continue non-weight bearing to affected extremity with elevation while non-ambulatory. Rest of care per primary team.      SUBJECTIVE:     The patient was seen, evaluated, and assessed at bedside today. The patient was awake, alert, and in no acute distress. The patient reports minimal pain at this time. Pain is well controlled with current pain management regimen. Patient reports normal appetite. Patient denies N/V/F/chills/SOB/CP. OBJECTIVE:     Vitals:   /77   Pulse 81   Temp 99.8 °F (37.7 °C)   Resp 16   Ht 6' 4" (1.93 m)   Wt 104 kg (230 lb)   SpO2 98%   BMI 28.00 kg/m²     Temp (24hrs), Av.8 °F (37.1 °C), Min:97.5 °F (36.4 °C), Max:99.8 °F (37.7 °C)      Physical Exam    Cardiovascular status at baseline. Neurological status at baseline. No erythema, edema, or lymphangitis noted from dressing. Lower extremity temperature WNL.     Dressings on left lower extremity: clean, dry, and intact    Additional Data:     Labs:    Results from last 7 days   Lab Units 11/30/23  0526   WBC Thousand/uL 9.64   HEMOGLOBIN g/dL 13.4   HEMATOCRIT % 41.1   PLATELETS Thousands/uL 203   NEUTROS PCT % 70   LYMPHS PCT % 18   MONOS PCT % 9   EOS PCT % 3     Results from last 7 days   Lab Units 11/30/23  0526 11/29/23  0614 11/28/23  0618   POTASSIUM mmol/L 3.8   < > 4.0   CHLORIDE mmol/L 103   < > 104   CO2 mmol/L 28   < > 28   BUN mg/dL 18   < > 15   CREATININE mg/dL 0.95   < > 0.84   CALCIUM mg/dL 9.0   < > 8.7   ALK PHOS U/L  --   --  98   ALT U/L  --   --  16   AST U/L  --   --  11*    < > = values in this interval not displayed. Results from last 7 days   Lab Units 11/28/23  0618   INR  1.32*       * I Have Reviewed All Lab Data Listed Above. Recent Cultures (last 7 days):     Results from last 7 days   Lab Units 11/27/23 2144 11/27/23 2050   BLOOD CULTURE  No Growth at 48 hrs. No Growth at 48 hrs.  --    GRAM STAIN RESULT   --  1+ Polys*  1+ Gram positive cocci in pairs*   WOUND CULTURE   --  2+ Growth of Staphylococcus aureus*  2+ Growth of     Results from last 7 days   Lab Units 11/27/23 2050   ANAEROBIC CULTURE  3+ Growth of Peptoniphilus harei group*       Imaging: I have personally reviewed pertinent post-operative films in PACS:  EKG, Pathology, and Other Studies: I have personally reviewed pertinent reports. ** Please Note: Portions of the record may have been created with voice recognition software. Occasional wrong word or "sound a like" substitutions may have occurred due to the inherent limitations of voice recognition software. Read the chart carefully and recognize, using context, where substitutions have occurred.  **

## 2023-11-30 NOTE — QUICK NOTE
Called patient's wife Pedro Murrell to update them on patient's current condition and plan of care. All of their questions were answered to their satisfaction and they are appreciative of call.

## 2023-11-30 NOTE — PROGRESS NOTES
INTERNAL MEDICINE RESIDENCY PROGRESS NOTE     Name: Xavier Brown. Age & Sex: 72 y.o. male   MRN: [id number]  Unit/Bed#: [facility] 912-01   Encounter: [id number]  Team: SOD Team C     PATIENT INFORMATION     Name: Xavier Brown. Age & Sex: 72 y.o. male   MRN: [id number]  Hospital Stay Days: 2    ASSESSMENT/PLAN     Principal Problem:    Osteomyelitis of foot, acute ([address])  Active Problems:    Type 2 diabetes mellitus with diabetic neuropathy ([address])    Puncture wound of left foot with foreign body, subsequent encounter    Essential hypertension    S/P transmetatarsal amputation of foot, right ([address])    Hyperlipidemia    Atrial fibrillation with RVR ([address])      * Osteomyelitis of foot, acute ([address])  Assessment & Plan  Pt presented on 11/27 with L foot wound in the setting of uncontrolled diabetes. Pt has neuropathy and has not felt any pain at the wound site. He was started on IV cefazolin on 11/28 out of concern for soft tissue infection. MRI on 11/29/23 showed definite osteomyelitis of his 4th metatarsal head and likely early onset osteomyelitis in the 4th toe proximal phalanx. Plantar soft tissue ulcer at the level of the fourth MTP joint with associated superficial soft tissue gas and probable necrotizing infection. No evidence of a discrete abscess. Wound culture growing Staph aureus and Peptostreptococcus. As per podiatry surgical note on 11/29/2 all nonviable soft tissue and bone able to be removed, primary closure achieved. ID consulted for recommendations on antibiotic course.     Plan:   Per ID switch to IV Unasyn while in hospital then PO Augmentin upon discharge for a total of 7 days following surgical source control  Monitor wound area post-op  Podiatry following and to change dressings tomorrow   Follow up OR cultures    Puncture wound of left foot with foreign body, subsequent encounter  Assessment & Plan  Patient presenting with left fourth interdigital space wound status post incision and drainage on 10/27/2023, left foot delayed primary closure 10/30/2023, left dorsal medial second toe wound. These are both complications of type 2 diabetes neuropathy. Patient noted to have ascending erythema and localized edema with podiatry. Evaluated by podiatry in the ED who recommended initiation of IV antibiotics with eventual plan for or washout. Last Eliquis dose of a.m. of 11/27  [id number] of 5 does not require heparin bridge, but will initiate starting 11/27.    -Wound culture gram stain showed +1 gram positive cocci in pairs  -Wound culture positive for anaerobic, gram positive cocci as well as 2+ staph aureus. -X-ray consistent with possible osteomyelitis as well as soft tissue swelling  -MRI showing definite osteomyelitis involving the fourth metatarsal head and probable early osteomyelitis involving the fourth toe proximal phalanx, plantar soft tissue ulcer at the level of the fourth MTP joint with associated superficial soft tissue gas and probable necrotizing infection, no evidence of a discrete abscess  -No growth on 48 hour blood cultures     Plan:  On IV unasyn now, which is changed from cefazolin per ID recommendations  Await eventual OR cultures  Podiatry following  No growth on blood cultures at 48 hours    Type 2 diabetes mellitus with diabetic neuropathy [facility])  Assessment & Plan  Lab Results   Component Value Date    HGBA1C 9.8 (H) 10/25/2023       Recent Labs     11/29/23  1718 11/29/23  2124 11/30/23  0754 11/30/23  1114   POCGLU 141* 219* 91 160*       Blood Sugar Average: Last 72 hrs:  (P) [id number]    After clarifying with patient, home regimen is 40 units of lantus at bedtime, 6 units of lispro with meals and 500 mg metformin twice daily. He states that his home fasting blood sugars in the morning are between 100-150 while taking 40 units nightly and for this reason, he has not decreased his nightly glargine as previously recommended by his physician.  He states that his preprandial Accu-Cheks are 180s to 200s. Most recent HgbA1c is 9.8 in Oct 2023. As noted above this is the likely cause of chronic foot wounds and acute infection this admission. Plan:  Hold metformin while inpatient  Continue glargine on a reduced dose versus home dose at 30 units nightly for a 25% reduction from his preadmission needs  SSI 3 algorithm 3 times daily mealtime insulin with nighttime Accu-Cheks,   Carb controlled diet level 2  Hypoglycemia protocol  BG goal 140-180, monitor for tight glycemic control after OR to help with healing  When approaching discharge, can consider up titration of metformin with close follow-up with PCP depending on renal function    Essential hypertension  Assessment & Plan  Blood Pressure: 146/77  Home regimen: Cardizem 120 mg twice daily, lisinopril 40 mg daily, Toprol 100 mg twice daily    On admission, pt hypertensive to 213/103 likely in setting of acute injury and missed BP meds while in ED for several hours    Today patient has blood pressures in the 150s/90s. Elevated systolic pressures may be related to patient's anxiety. Patient's pressures seem to be more controlled with addition of chlorthalidone 25 mg.    Plan:  Continue home regimen  Continue chlorthalidone 25 mg daily   Add hydralazine 5 mg IV as needed every 6 hours for SBP greater than 160  Continue to monitor vitals and symptoms    S/P transmetatarsal amputation of foot, right ([facility])  Assessment & Plan  Routine local skin care and pressure offloading while sitting in bed to prevent ulcers    Atrial fibrillation with RVR ([facility])  Assessment & Plan  [id number] of 5. Takes Eliquis 5 mg twice daily as outpatient. Last Eliquis dose on a.m. of 11/27  Strictly speaking, patient does not require heparin bridge for procedure, but given his multiple comorbidities will initiate on heparin drip for pre-op. Rate controlled during hospital stay.     Plan:  Transition back to Eliquis  Continue home diltiazem 120 mg twice daily and metoprolol 100 mg twice daily      Hyperlipidemia  Assessment & Plan  Lab Results   Component Value Date    CHOLESTEROL 140 2018    TRIG 148 2018    HDL 38 (L) 2018    LDLCALC 72 2018     Plan:  Continue home atorvastatin 40 mg p.o. daily  Consider repeating lipids in outpatient setting        Disposition: continue inpatient care s/p L foot 4th ray resection with podiatry, follow-up podiatry recommendations for stability on discharge, anticipate discharge later this week, will ask PT/OT to reevaluate patient postop for discharge recommendations    SUBJECTIVE     Patient seen and examined. No acute events overnight. Patient was nervous post op due to concern of if the surgery went well. After going over podiatry's surgical note with him, he feels much relief. He denies any fevers, chills, nausea, vomiting, or chest pain. He has no pain at his surgical site. OBJECTIVE     Vitals:    23 2132 23 2335 23 0700 23 0810   BP: 158/80  146/77 146/77   BP Location:   Right arm    Pulse: 64  81    Resp:   16    Temp:   99.8 °F (37.7 °C) 99.8 °F (37.7 °C)   TempSrc:   Oral    SpO2: 96% 96% 98%    Weight:       Height:          Temperature:   Temp (24hrs), Av.8 °F (37.1 °C), Min:97.5 °F (36.4 °C), Max:99.8 °F (37.7 °C)    Temperature: 99.8 °F (37.7 °C)  Intake & Output:  I/O          07 0700  07 07 07 0700    P. O. 220      I.V. (mL/kg)  510 (4.9)     Total Intake(mL/kg) 220 (2.1) 510 (4.9)     Urine (mL/kg/hr) 1200 (0.5) 650 (0.3) 400 (0.6)    Total Output 1200 650 400    Net -980 -140 -400           Unmeasured Urine Occurrence 2 x            Weights:   IBW (Ideal Body Weight): 86.8 kg    Body mass index is 28 kg/m².   Weight (last 2 days)       Date/Time Weight    23 172 --    Comment rows:    OBSERV: Xray completed at [place] at 23 [phone] (541)          Physical Exam  Constitutional:       General: He is not in acute distress. Appearance: Normal appearance. HENT:      Head: Normocephalic and atraumatic. Right Ear: External ear normal.      Left Ear: External ear normal.      Nose: Nose normal.      Mouth/Throat:      Mouth: Mucous membranes are moist.   Eyes:      Conjunctiva/sclera: Conjunctivae normal.   Cardiovascular:      Rate and Rhythm: Normal rate and regular rhythm. Heart sounds: Normal heart sounds. Pulmonary:      Effort: Pulmonary effort is normal. No respiratory distress. Breath sounds: Normal breath sounds. Abdominal:      General: Bowel sounds are normal. There is no distension. Palpations: Abdomen is soft. Tenderness: There is no abdominal tenderness. There is no guarding. Musculoskeletal:         General: No swelling or tenderness. Right lower leg: No edema. Left lower leg: No edema. Comments: Left foot wrapped, no seepage noted from dressing   Skin:     General: Skin is warm and dry. Comments: Small amount of erythema on L lower leg above surgical bandages. Incision site not directly visualized. Neurological:      Mental Status: He is alert and oriented to person, place, and time. Mental status is at baseline. Cranial Nerves: No cranial nerve deficit. Psychiatric:         Mood and Affect: Mood normal.         Behavior: Behavior normal.      Comments: Patient is much more relaxed today       LABORATORY DATA     Labs: I have personally reviewed pertinent reports.   Results from last 7 days   Lab Units 11/30/23 0526 11/29/23 0614 11/28/23  0618   WBC Thousand/uL 9.64 8.42 8.59   HEMOGLOBIN g/dL 13.4 13.9 12.9   HEMATOCRIT % 41.1 44.5 38.8   PLATELETS Thousands/uL 203 212 205   NEUTROS PCT % 70 63 53   MONOS PCT % 9 9 8   EOS PCT % 3 3 4      Results from last 7 days   Lab Units 11/30/23 0526 11/29/23 0614 11/28/23 0618 11/27/23  1810   POTASSIUM mmol/L 3.8 4.0 4.0 5.0   CHLORIDE mmol/L 103 103 104 106   CO2 mmol/L 28 29 28 23   BUN mg/dL 18 15 15 20   CREATININE mg/dL 0.95 0.89 0.84 0.95   CALCIUM mg/dL 9.0 9.4 8.7 9.6   ALK PHOS U/L  --   --  98 121*   ALT U/L  --   --  16 16   AST U/L  --   --  11* 18     Results from last 7 days   Lab Units 11/28/23  0618   MAGNESIUM mg/dL 1.9     Results from last 7 days   Lab Units 11/28/23  0618   PHOSPHORUS mg/dL 3.2      Results from last 7 days   Lab Units 11/28/23 2021 11/28/23  1233 11/28/23  0618 11/28/23  0013   INR   --   --  1.32* 1.29*   PTT seconds 71* 35 65* 32               IMAGING & DIAGNOSTIC TESTING     Radiology Results: I have personally reviewed pertinent reports. MRI foot/forefoot toes left w wo contrast    Result Date: 11/28/2023  Impression: Findings consistent with definite osteomyelitis involving the fourth metatarsal head and probable early osteomyelitis involving the fourth toe proximal phalanx. Plantar soft tissue ulcer at the level of the fourth MTP joint with associated superficial soft tissue gas and probable necrotizing infection. No evidence of a discrete abscess. The study was marked in [facility] for immediate notification. Workstation performed: [id number]     XR foot 3+ views LEFT    Result Date: 11/28/2023  Impression: There is some demineralization in the region of the head of the fourth metatarsal. Recommend MRI to exclude osteomyelitis. This has been scheduled. There is soft tissue swelling. Workstation performed: [id number]     Other Diagnostic Testing: I have personally reviewed pertinent reports.     ACTIVE MEDICATIONS     Current Facility-Administered Medications   Medication Dose Route Frequency    acetaminophen (TYLENOL) tablet 650 mg  650 mg Oral Q6H PRN    aluminum-magnesium hydroxide-simethicone (MAALOX) oral suspension 30 mL  30 mL Oral Q6H PRN    ampicillin-sulbactam (UNASYN) 3 g in sodium chloride 0.9 % 100 mL IVPB  3 g Intravenous Q6H    apixaban (ELIQUIS) tablet 5 mg  5 mg Oral BID    atorvastatin (LIPITOR) tablet 40 mg  40 mg Oral Daily    chlorthalidone tablet 25 mg  25 mg Oral Daily    diltiazem (CARDIZEM) tablet 120 mg  120 mg Oral BID    hydrALAZINE (APRESOLINE) injection 5 mg  5 mg Intravenous Q6H PRN    insulin glargine (LANTUS) subcutaneous injection 30 Units 0.3 mL  30 Units Subcutaneous HS    insulin lispro (HumaLOG) 100 units/mL subcutaneous injection 1-5 Units  1-5 Units Subcutaneous HS    insulin lispro (HumaLOG) 100 units/mL subcutaneous injection 1-6 Units  1-6 Units Subcutaneous TID AC    insulin lispro (HumaLOG) 100 units/mL subcutaneous injection 5 Units  5 Units Subcutaneous TID With Meals    lisinopril (ZESTRIL) tablet 40 mg  40 mg Oral Daily    metoprolol succinate (TOPROL-XL) 24 hr tablet 100 mg  100 mg Oral BID    ondansetron (ZOFRAN) injection 4 mg  4 mg Intravenous Q6H PRN    oxyCODONE-acetaminophen (PERCOCET) 5-325 mg per tablet 1 tablet  1 tablet Oral Q4H PRN    senna (SENOKOT) tablet 8.6 mg  1 tablet Oral Daily    tamsulosin (FLOMAX) capsule 0.4 mg  0.4 mg Oral Daily With Dinner       VTE Pharmacologic Prophylaxis: Reason for no pharmacologic prophylaxis AC with Eliquis   VTE Mechanical Prophylaxis: sequential compression device    Portions of the record may have been created with voice recognition software. Occasional wrong word or "sound a like" substitutions may have occurred due to the inherent limitations of voice recognition software. Read the chart carefully and recognize, using context, where substitutions have occurred.       Ning Hale MD, PGY-1  Internal Medicine Residency   [address] [place] [address]

## 2023-12-01 ENCOUNTER — HOME CARE VISIT (OUTPATIENT)
Dept: HOME HEALTH SERVICES | Facility: HOME HEALTHCARE | Age: 73
End: 2023-12-01
Payer: COMMERCIAL

## 2023-12-01 VITALS
DIASTOLIC BLOOD PRESSURE: 68 MMHG | OXYGEN SATURATION: 95 % | RESPIRATION RATE: 15 BRPM | WEIGHT: 230 LBS | BODY MASS INDEX: 28.01 KG/M2 | SYSTOLIC BLOOD PRESSURE: 113 MMHG | HEART RATE: 68 BPM | HEIGHT: 76 IN | TEMPERATURE: 98.5 F

## 2023-12-01 LAB
ANION GAP SERPL CALCULATED.3IONS-SCNC: 9 MMOL/L
BASOPHILS # BLD AUTO: 0.03 THOUSANDS/ÂΜL (ref 0–0.1)
BASOPHILS NFR BLD AUTO: 0 % (ref 0–1)
BUN SERPL-MCNC: 18 MG/DL (ref 5–25)
CALCIUM SERPL-MCNC: 9.1 MG/DL (ref 8.4–10.2)
CHLORIDE SERPL-SCNC: 104 MMOL/L (ref 96–108)
CO2 SERPL-SCNC: 26 MMOL/L (ref 21–32)
CREAT SERPL-MCNC: 1.02 MG/DL (ref 0.6–1.3)
EOSINOPHIL # BLD AUTO: 0.38 THOUSAND/ÂΜL (ref 0–0.61)
EOSINOPHIL NFR BLD AUTO: 6 % (ref 0–6)
ERYTHROCYTE [DISTWIDTH] IN BLOOD BY AUTOMATED COUNT: 13.9 % (ref 11.6–15.1)
GFR SERPL CREATININE-BSD FRML MDRD: 73 ML/MIN/1.73SQ M
GLUCOSE SERPL-MCNC: 100 MG/DL (ref 65–140)
GLUCOSE SERPL-MCNC: 254 MG/DL (ref 65–140)
GLUCOSE SERPL-MCNC: 97 MG/DL (ref 65–140)
HCT VFR BLD AUTO: 43.6 % (ref 36.5–49.3)
HGB BLD-MCNC: 14.3 G/DL (ref 12–17)
IMM GRANULOCYTES # BLD AUTO: 0.02 THOUSAND/UL (ref 0–0.2)
IMM GRANULOCYTES NFR BLD AUTO: 0 % (ref 0–2)
LYMPHOCYTES # BLD AUTO: 1.84 THOUSANDS/ÂΜL (ref 0.6–4.47)
LYMPHOCYTES NFR BLD AUTO: 26 % (ref 14–44)
MCH RBC QN AUTO: 31.4 PG (ref 26.8–34.3)
MCHC RBC AUTO-ENTMCNC: 32.8 G/DL (ref 31.4–37.4)
MCV RBC AUTO: 96 FL (ref 82–98)
MONOCYTES # BLD AUTO: 0.8 THOUSAND/ÂΜL (ref 0.17–1.22)
MONOCYTES NFR BLD AUTO: 12 % (ref 4–12)
NEUTROPHILS # BLD AUTO: 3.89 THOUSANDS/ÂΜL (ref 1.85–7.62)
NEUTS SEG NFR BLD AUTO: 56 % (ref 43–75)
NRBC BLD AUTO-RTO: 0 /100 WBCS
PLATELET # BLD AUTO: 197 THOUSANDS/UL (ref 149–390)
PMV BLD AUTO: 10.4 FL (ref 8.9–12.7)
POTASSIUM SERPL-SCNC: 3.9 MMOL/L (ref 3.5–5.3)
RBC # BLD AUTO: 4.55 MILLION/UL (ref 3.88–5.62)
SODIUM SERPL-SCNC: 139 MMOL/L (ref 135–147)
WBC # BLD AUTO: 6.96 THOUSAND/UL (ref 4.31–10.16)

## 2023-12-01 PROCEDURE — 82948 REAGENT STRIP/BLOOD GLUCOSE: CPT

## 2023-12-01 PROCEDURE — 85025 COMPLETE CBC W/AUTO DIFF WBC: CPT

## 2023-12-01 PROCEDURE — 97164 PT RE-EVAL EST PLAN CARE: CPT

## 2023-12-01 PROCEDURE — 99238 HOSP IP/OBS DSCHRG MGMT 30/<: CPT | Performed by: INTERNAL MEDICINE

## 2023-12-01 PROCEDURE — NC001 PR NO CHARGE: Performed by: INTERNAL MEDICINE

## 2023-12-01 PROCEDURE — 99232 SBSQ HOSP IP/OBS MODERATE 35: CPT | Performed by: INTERNAL MEDICINE

## 2023-12-01 PROCEDURE — 97168 OT RE-EVAL EST PLAN CARE: CPT

## 2023-12-01 PROCEDURE — 80048 BASIC METABOLIC PNL TOTAL CA: CPT

## 2023-12-01 PROCEDURE — 99024 POSTOP FOLLOW-UP VISIT: CPT | Performed by: PODIATRIST

## 2023-12-01 RX ORDER — AMOXICILLIN AND CLAVULANATE POTASSIUM 875; 125 MG/1; MG/1
1 TABLET, FILM COATED ORAL EVERY 12 HOURS SCHEDULED
Qty: 12 TABLET | Refills: 0 | Status: SHIPPED | OUTPATIENT
Start: 2023-12-01 | End: 2023-12-07

## 2023-12-01 RX ORDER — CHLORTHALIDONE 25 MG/1
25 TABLET ORAL DAILY
Qty: 30 TABLET | Refills: 0 | Status: SHIPPED | OUTPATIENT
Start: 2023-12-02 | End: 2023-12-08 | Stop reason: SDUPTHER

## 2023-12-01 RX ORDER — INSULIN GLARGINE 100 [IU]/ML
40 INJECTION, SOLUTION SUBCUTANEOUS
Qty: 12 ML | Refills: 0 | Status: SHIPPED | OUTPATIENT
Start: 2023-12-01 | End: 2023-12-31

## 2023-12-01 RX ADMIN — APIXABAN 5 MG: 5 TABLET, FILM COATED ORAL at 08:16

## 2023-12-01 RX ADMIN — DILTIAZEM HYDROCHLORIDE 120 MG: 60 TABLET ORAL at 08:15

## 2023-12-01 RX ADMIN — INSULIN LISPRO 5 UNITS: 100 INJECTION, SOLUTION INTRAVENOUS; SUBCUTANEOUS at 08:16

## 2023-12-01 RX ADMIN — LISINOPRIL 40 MG: 20 TABLET ORAL at 08:16

## 2023-12-01 RX ADMIN — CHLORTHALIDONE 25 MG: 25 TABLET ORAL at 08:15

## 2023-12-01 RX ADMIN — INSULIN LISPRO 3 UNITS: 100 INJECTION, SOLUTION INTRAVENOUS; SUBCUTANEOUS at 12:29

## 2023-12-01 RX ADMIN — SODIUM CHLORIDE 3 G: 9 INJECTION, SOLUTION INTRAVENOUS at 08:16

## 2023-12-01 RX ADMIN — METOPROLOL SUCCINATE 100 MG: 100 TABLET, EXTENDED RELEASE ORAL at 08:16

## 2023-12-01 RX ADMIN — INSULIN LISPRO 5 UNITS: 100 INJECTION, SOLUTION INTRAVENOUS; SUBCUTANEOUS at 12:29

## 2023-12-01 RX ADMIN — ATORVASTATIN CALCIUM 40 MG: 40 TABLET, FILM COATED ORAL at 08:16

## 2023-12-01 RX ADMIN — SODIUM CHLORIDE 3 G: 9 INJECTION, SOLUTION INTRAVENOUS at 02:45

## 2023-12-01 NOTE — NURSING NOTE
DC instructions gone over with pt; verbalized understanding. IV removed. Left unit via wheelchair with family. Dc'd to home with homecare services.

## 2023-12-01 NOTE — PLAN OF CARE
Problem: PAIN - ADULT  Goal: Verbalizes/displays adequate comfort level or baseline comfort level  Description: Interventions:  - Encourage patient to monitor pain and request assistance  - Assess pain using appropriate pain scale  - Administer analgesics based on type and severity of pain and evaluate response  - Implement non-pharmacological measures as appropriate and evaluate response  - Consider cultural and social influences on pain and pain management  - Notify physician/advanced practitioner if interventions unsuccessful or patient reports new pain  Outcome: Progressing     Problem: INFECTION - ADULT  Goal: Absence or prevention of progression during hospitalization  Description: INTERVENTIONS:  - Assess and monitor for signs and symptoms of infection  - Monitor lab/diagnostic results  - Monitor all insertion sites, i.e. indwelling lines, tubes, and drains  - Monitor endotracheal if appropriate and nasal secretions for changes in amount and color  - [place] appropriate cooling/warming therapies per order  - Administer medications as ordered  - Instruct and encourage patient and family to use good hand hygiene technique  - Identify and instruct in appropriate isolation precautions for identified infection/condition  Outcome: Progressing

## 2023-12-01 NOTE — DISCHARGE SUMMARY
INTERNAL MEDICINE RESIDENCY DISCHARGE SUMMARY     Alberto Hansen   72 y.o. male  MRN: [id number]  Room/Bed: [facility] 912/[facility] 912-01     [address] [facility] 9   Encounter: [id number]    Principal Problem:    Osteomyelitis of foot, acute ([address])  Active Problems:    Type 2 diabetes mellitus with diabetic neuropathy ([address])    Puncture wound of left foot with foreign body, subsequent encounter    Essential hypertension    S/P transmetatarsal amputation of foot, right ([address])    Hyperlipidemia    Atrial fibrillation with RVR ([address])      * Osteomyelitis of foot, acute ([address])  Assessment & Plan  Pt presented on 11/27 with L foot wound in the setting of uncontrolled diabetes. Pt has neuropathy and has not felt any pain at the wound site. He was started on IV cefazolin on 11/28 out of concern for soft tissue infection. MRI on 11/29/23 showed definite osteomyelitis of his 4th metatarsal head and likely early onset osteomyelitis in the 4th toe proximal phalanx. Plantar soft tissue ulcer at the level of the fourth MTP joint with associated superficial soft tissue gas and probable necrotizing infection. No evidence of a discrete abscess. Wound culture growing Staph aureus and Peptostreptococcus. As per podiatry surgical note on 11/29/2 all nonviable soft tissue and bone able to be removed, primary closure achieved. ID consulted for recommendations on antibiotic course. Patient now transitioned to unasyn.      Plan:   Per ID switch to IV Unasyn while in hospital then PO Augmentin upon discharge for a total of 7 days following surgical source control  PT OT recommending home with home health on discharge  Monitor wound area post-op with home health  Stable for discharge per podiatry, continue outpatient follow-up  Follow up OR pathology    Puncture wound of left foot with foreign body, subsequent encounter  Assessment & Plan  Patient presenting with left fourth interdigital space wound status post incision and drainage on 10/27/2023, left foot delayed primary closure 10/30/2023, left dorsal medial second toe wound. These are both complications of type 2 diabetes neuropathy. Patient noted to have ascending erythema and localized edema with podiatry. Evaluated by podiatry in the ED who recommended initiation of IV antibiotics with eventual plan for or washout. Last Eliquis dose of a.m. of 11/27  [id number] of 5 does not require heparin bridge, but will initiate starting 11/27.    -Wound culture gram stain showed +1 gram positive cocci in pairs  -Wound culture positive for anaerobic, gram positive cocci as well as 2+ staph aureus. -X-ray consistent with possible osteomyelitis as well as soft tissue swelling  -MRI showing definite osteomyelitis involving the fourth metatarsal head and probable early osteomyelitis involving the fourth toe proximal phalanx, plantar soft tissue ulcer at the level of the fourth MTP joint with associated superficial soft tissue gas and probable necrotizing infection, no evidence of a discrete abscess  -No growth on 72 hour blood cultures     Plan:  Antibiotics as above  Outpatient VNA and podiatry follow-up    Type 2 diabetes mellitus with diabetic neuropathy [facility])  Assessment & Plan  Lab Results   Component Value Date    HGBA1C 9.8 (H) 10/25/2023       Recent Labs     11/30/23  1647 11/30/23  2055 12/01/23  0744 12/01/23  1134   POCGLU 94 180* 100 254*       Blood Sugar Average: Last 72 hrs:  (P) 166    After clarifying with patient, home regimen is 40 units of lantus at bedtime, 6 units of lispro with meals and 500 mg metformin twice daily. He states that his home fasting blood sugars in the morning are between 100-150 while taking 40 units nightly and for this reason, he has not decreased his nightly glargine as previously recommended by his physician. He states that his preprandial Accu-Cheks are 180s to 200s. Most recent HgbA1c is 9.8 in Oct 2023.   As noted above this is the likely cause of chronic foot wounds and acute infection this admission. BG mostly within goal range this admission on regimen below. Plan:  Resume home regimen on discharge  PCP follow-up for further adjustments    Essential hypertension  Assessment & Plan  Blood Pressure: 113/68  Home regimen: Cardizem 120 mg twice daily, lisinopril 40 mg daily, Toprol 100 mg twice daily    On admission, pt hypertensive to 213/103 likely in setting of acute injury and missed BP meds while in ED for several hours    Today patient has blood pressures in the 110s/60s. Improved from 130-140/70-90 yesterday. Patient's pressures seem to be more controlled with addition of chlorthalidone 25 mg. Initial hypertension may in part have been due to anxiety related to upcoming surgery. Plan:  Continue home regimen  Continue chlorthalidone 25 mg daily on discharge  Monitor home Bps  PCP follow-up for further adjustments to regimen    S/P transmetatarsal amputation of foot, right (HCC)  Assessment & Plan  Routine local skin care and pressure offloading while sitting in bed to prevent ulcers    Atrial fibrillation with RVR (HCC)  Assessment & Plan  [id number] of 5. Takes Eliquis 5 mg twice daily as outpatient. Last Eliquis dose on a.m. of 11/27  Strictly speaking, patient does not require heparin bridge for procedure, but given his multiple comorbidities will initiate on heparin drip for pre-op. Rate controlled during hospital stay. Patient restarted Eliquis 11/30 in the morning.      Plan:  Continue Eliquis  Continue home diltiazem 120 mg twice daily and metoprolol 100 mg twice daily      Hyperlipidemia  Assessment & Plan  Lab Results   Component Value Date    CHOLESTEROL 140 11/08/2018    TRIG 148 11/08/2018    HDL 38 (L) 11/08/2018    LDLCALC 72 11/08/2018     Plan:  Continue home atorvastatin 40 mg p.o. daily  Consider repeating lipids in outpatient setting        [address] [place] [address] 77-year-old male with past medical history type 2 diabetes with neuropathy complicated by right TMA, HTN, BPH, A-fib on Eliquis. He presented to the ED on 11/27 at the recommendation of his outpatient podiatrist due to injury of left foot. Patient has significant neuropathy of both feet to mid shin for many years. Was recently admitted in October for I&D and washout with removal of foreign body from left foot. Was discharged home with VNA who noted new wound on bottom of left foot 3 days PTA and podiatrist Dr. Godwin Beebe recommended going to the ED. On presentation patient was comfortable without any pain and was hemodynamically stable aside from hypertension. Labs on arrival unremarkable aside from ESR 70 and CRP 33. Podiatry saw patient in the ED and recommended IV antibiotics and eventual OR. MRI revealed osteomyelitis of fourth metatarsal head and probable early mild osteomyelitis of fourth proximal phalanx with plantar soft tissue ulcer of fourth MTP and soft tissues gas surrounding. Patient was taken to the OR on 11/29 for left fourth ray resection with podiatry. He did well postop with minimal pain due to neuropathy. On POD 1 podiatry change dressing and felt that he was stable for discharge. Podiatry provided strict nonweightbearing to left foot precautions. ID was consulted as wound culture grew MSSA as well as anaerobic's, recommended transition to Unasyn and then Augmentin on discharge for total of 7 days following source control. Blood cultures remained negative through admission. Given initial hypertension chlorthalidone was added to antihypertensive regimen with good response in BP. BG remained well-controlled throughout admission. PT/OT recommended home with home health and patient and his wife felt comfortable with plan for discharge.       DISCHARGE INFORMATION     PCP at Discharge: Berry Florence MD    Admitting Provider: Rinku Mchugh DO  Admission Date: 11/27/2023    Discharge Provider: Jennifer Rios DO  Discharge Date: 12/1/23    Discharge Disposition: Home with [address]  Discharge Condition: good  Discharge with Lines: no    Discharge Diet: diabetic diet  Activity Restrictions:  Nonweightbearing to left foot per podiatry  Test Results Pending at Discharge: OR pathology    Discharge Diagnoses:  Principal Problem:    Osteomyelitis of foot, acute ([address])  Active Problems:    Type 2 diabetes mellitus with diabetic neuropathy ([address])    Puncture wound of left foot with foreign body, subsequent encounter    Essential hypertension    S/P transmetatarsal amputation of foot, right ([address])    Hyperlipidemia    Atrial fibrillation with RVR ([address])  Resolved Problems:    * No resolved hospital problems. *      Consulting Providers:      Diagnostic & Therapeutic Procedures Performed:  XR foot left 3+ views    Result Date: 11/30/2023  Impression: Postop left foot Workstation performed: [id number]     MRI foot/forefoot toes left w wo contrast    Result Date: 11/28/2023  Impression: Findings consistent with definite osteomyelitis involving the fourth metatarsal head and probable early osteomyelitis involving the fourth toe proximal phalanx. Plantar soft tissue ulcer at the level of the fourth MTP joint with associated superficial soft tissue gas and probable necrotizing infection. No evidence of a discrete abscess. The study was marked in [facility] for immediate notification. Workstation performed: [id number]     XR foot 3+ views LEFT    Result Date: 11/28/2023  Impression: There is some demineralization in the region of the head of the fourth metatarsal. Recommend MRI to exclude osteomyelitis. This has been scheduled. There is soft tissue swelling.  Workstation performed: [id number]       Code Status: Level 1 - Full Code  Advance Directive & Living Will: <no information>  Power of : Yes  POLST:      Medications:  Current Discharge Medication List        Current Discharge Medication List START taking these medications    Details   amoxicillin-clavulanate (AUGMENTIN) 875-125 mg per tablet Take 1 tablet by mouth every 12 (twelve) hours for 6 days Take first dose on day of discharge with dinner. Then twice per day until finished. Qty: 12 tablet, Refills: 0    Associated Diagnoses: Puncture wound of left foot with foreign body, subsequent encounter; Cellulitis of left lower extremity; Osteomyelitis of foot, acute (HCC)      chlorthalidone 25 mg tablet Take 1 tablet (25 mg total) by mouth daily Do not start before December 2, 2023. Qty: 30 tablet, Refills: 0    Associated Diagnoses: Essential hypertension           Current Discharge Medication List        CONTINUE these medications which have NOT CHANGED    Details   ! ! Accu-Chek Guide test strip TEST BLOOD SUGARS once a day  Qty: 100 strip, Refills: 22    Associated Diagnoses: Type 2 diabetes mellitus with diabetic neuropathy, without long-term current use of insulin ([facility])      acetaminophen (Mapap Arthritis Pain) 650 mg CR tablet Take 1 tablet (650 mg total) by mouth 2 (two) times a day  Qty: 60 tablet, Refills: 1    Associated Diagnoses: Diabetic ulcer of right 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, with fat layer exposed ([facility])      atorvastatin (LIPITOR) 40 mg tablet Take 1 tablet (40 mg total) by mouth daily  Qty: 90 tablet, Refills: 3    Associated Diagnoses: Hyperlipidemia, unspecified hyperlipidemia type      diltiazem (CARDIZEM) 120 MG tablet Take 1 tablet (120 mg total) by mouth 2 (two) times a day  Qty: 180 tablet, Refills: 0    Associated Diagnoses: Cellulitis of left lower leg      Eliquis 5 MG Take 1 tablet (5 mg total) by mouth 2 (two) times a day  Qty: 60 tablet, Refills: 11    Associated Diagnoses: Atrial fibrillation, unspecified type ([address])      ! ! glucose blood (OneTouch Ultra) test strip Use as instructed  Qty: 100 strip, Refills: 3    Associated Diagnoses: Type 2 diabetes mellitus with diabetic neuropathy, without long-term current use of insulin ([address])      ! ! glucose blood (OneTouch Verio) test strip Use 1 each daily Use as instructed  Qty: 100 strip, Refills: 3    Associated Diagnoses: Type 2 diabetes mellitus with diabetic neuropathy, without long-term current use of insulin ([facility])      insulin lispro (HumaLOG) 100 units/mL injection pen INJECT 6 UNITS UNDER THE SKIN 3 (THREE) TIMES A DAY WITH MEALS  Qty: 5.4 mL, Refills: 0    Associated Diagnoses: Type 2 diabetes mellitus with diabetic neuropathy, without long-term current use of insulin ([address])      ! ! Lancets (onetouch ultrasoft) lancets Use as instructed  Qty: 100 each, Refills: 3    Associated Diagnoses: Type 2 diabetes mellitus with diabetic neuropathy, without long-term current use of insulin ([address])      ! ! Lancets (onetouch ultrasoft) lancets Use daily  Qty: 100 each, Refills: 3    Associated Diagnoses: Type 2 diabetes mellitus with diabetic neuropathy, without long-term current use of insulin ([facility])      lisinopril (ZESTRIL) 40 mg tablet Take 1 tablet (40 mg total) by mouth daily  Qty: 90 tablet, Refills: 3    Associated Diagnoses: Essential hypertension      metFORMIN (GLUCOPHAGE) 500 mg tablet Take 1 tablet (500 mg total) by mouth 2 (two) times a day with meals  Qty: 180 tablet, Refills: 5    Associated Diagnoses: Type 2 diabetes mellitus with diabetic neuropathy, without long-term current use of insulin ([facility])      metoprolol succinate (TOPROL-XL) 100 mg 24 hr tablet Take 1 tablet (100 mg total) by mouth 2 (two) times a day  Qty: 180 tablet, Refills: 0    Associated Diagnoses: Cellulitis of left lower leg      tamsulosin (FLOMAX) 0.4 mg TAKE 1 CAPSULE (0.4 MG TOTAL) BY MOUTH DAILY WITH DINNER  Qty: 90 capsule, Refills: 2    Associated Diagnoses: Benign prostatic hyperplasia with weak urinary stream      Clenpiq 10-3.5-12 MG-GM -GM/160ML SOLN        !! - Potential duplicate medications found. Please discuss with provider. Allergies:   Allergies   Allergen Reactions    Simvastatin Myalgia    Itraconazole Other (See Comments)     Pt denies knowledge of allergy. .        FOLLOW-UP     PCP Outpatient Follow-up:  1 week    Consulting Providers Follow-up:  Podiatry      Active Issues Requiring Follow-up:   Wound care    Discharge Statement:   I spent 1 hour minutes discharging the patient. This time was spent on the day of discharge. I had direct contact with the patient on the day of discharge. Additional documentation is required if more than 30 minutes were spent on discharge. Portions of the record may have been created with voice recognition software. Occasional wrong word or "sound a like" substitutions may have occurred due to the inherent limitations of voice recognition software.   Read the chart carefully and recognize, using context, where substitutions have occurred.    ==  Tulio León MD  [address] [facility]  Internal Medicine Resident PGY-1

## 2023-12-01 NOTE — PROGRESS NOTES
Podiatry - Progress Note  Patient: Vasyl Desouza. 72 y.o. male   MRN: [id number]  PCP: Loc Chapa MD  Unit/Bed#: [facility] 076-14 Encounter: [id number]  Date Of Visit: 12/01/23    ASSESSMENT:    Vasyl Desouza. is a 72 y.o. male with:    Left fourth interdigital space wound  S/p left foot incision and drainage (DOS: 10/27/23)  S/p left foot delayed primary closure (DOS: 10/30/23)  S/p left partial 4th ray resection (DOS: 11/30/23)  Left dorsal medial second toe wound  Type 2 diabetes mellitus  Essential hypertension  Atrial fibrillation  History of right transmetatarsal amputation with free flap    PLAN:    POD2 s/p left partial 4th ray resection. Dressings changed at bedside. Incision site is stable, no acute clinical signs of infection. Patient is stable for discharge from a podiatry standpoint. Patient will follow up outpatient for post-operative care, instructions placed with discharge information. Appreciate PT/OT recommendations prior to discharge. Discussion with patient at length on the importance of staying strict nonweightbearing to the left foot. He is aware that noncompliance to his weightbearing status may put him at risk for delayed/nonhealing, infection, and need for further surgery. Dressings applied consisting of Betadine soaked adaptic, DSD. Vitals signs stable. Patient afebrile with no leukocytosis. No erythema, edema, or lymphangitis noted either proximal or distal to the incision site. Recommend completion of antibiotic course per ID recommendations, will continue on oral Augmentin after discharge  Elevation and offloading on green foam wedges or pillows when non-ambulatory  Rest of medical care per primary team.     Weightbearing Status: Nonweightbearing to left foot    SUBJECTIVE:     The patient was seen, evaluated, and assessed at bedside today. The patient was awake, alert, and in no acute distress. The patient reports no pain at this time.  Pain is well controlled with current pain management regimen. Patient reports normal appetite and using the restroom postoperatively. Patient denies N/V/F/chills/SOB/CP. OBJECTIVE:     Vitals:   /68   Pulse 68   Temp 98.5 °F (36.9 °C)   Resp 15   Ht 6' 4" (1.93 m)   Wt 104 kg (230 lb)   SpO2 95%   BMI 28.00 kg/m²     Temp (24hrs), Av °F (37.2 °C), Min:98.5 °F (36.9 °C), Max:99.4 °F (37.4 °C)      Physical Exam:     General:  Alert, cooperative, and in no distress. Lower extremity exam:  Cardiovascular status at baseline. Neurological status at baseline. S/p left partial 4th ray resection    Left Lower Extremity: S/p partial 4th ray resection. Incision site stable with sutures intact and skin edges well aligned. Capillary refill to skin edges < 3 seconds. Skin temperature within normal limits. No evidence of dehiscence. No signs of active infection: no purulence, no malodor, no ascending erythema, no crepitus, no fluctuance. Residual erythema and edema consistent with post-operative healing course. Stable mild maceration noted to distal aspect of incision    Clinical Images 23: Additional Data:     Labs:    Results from last 7 days   Lab Units 23  0609   WBC Thousand/uL 6.96   HEMOGLOBIN g/dL 14.3   HEMATOCRIT % 43.6   PLATELETS Thousands/uL 197   NEUTROS PCT % 56   LYMPHS PCT % 26   MONOS PCT % 12   EOS PCT % 6     Results from last 7 days   Lab Units 23  0609 23  0614 23  0618   POTASSIUM mmol/L 3.9   < > 4.0   CHLORIDE mmol/L 104   < > 104   CO2 mmol/L 26   < > 28   BUN mg/dL 18   < > 15   CREATININE mg/dL 1.02   < > 0.84   CALCIUM mg/dL 9.1   < > 8.7   ALK PHOS U/L  --   --  98   ALT U/L  --   --  16   AST U/L  --   --  11*    < > = values in this interval not displayed. Results from last 7 days   Lab Units 23  0618   INR  1.32*       * I Have Reviewed All Lab Data Listed Above.     Recent Cultures (last 7 days):     Results from last 7 days   Lab Units 23  2786 11/27/23 2050   BLOOD CULTURE  No Growth at 72 hrs. No Growth at 72 hrs.  --    GRAM STAIN RESULT   --  1+ Polys*  1+ Gram positive cocci in pairs*   WOUND CULTURE   --  2+ Growth of Staphylococcus aureus*  2+ Growth of     Results from last 7 days   Lab Units 11/27/23 2050   ANAEROBIC CULTURE  3+ Growth of Peptoniphilus harei group*       Imaging: I have personally reviewed pertinent films in PACS  EKG, Pathology, and Other Studies: I have personally reviewed pertinent reports. ** Please Note: Portions of the record may have been created with voice recognition software. Occasional wrong word or "sound a like" substitutions may have occurred due to the inherent limitations of voice recognition software. Read the chart carefully and recognize, using context, where substitutions have occurred.  **

## 2023-12-01 NOTE — OCCUPATIONAL THERAPY NOTE
Occupational Therapy Re-Evaluation     Patient Name: Analia Bernard.   Today's Date: 12/1/2023  Problem List  Principal Problem:    Osteomyelitis of foot, acute ([address])  Active Problems:    Hyperlipidemia    Type 2 diabetes mellitus with diabetic neuropathy (HCC)    Atrial fibrillation with RVR ([address])    Essential hypertension    S/P transmetatarsal amputation of foot, right ([address])    Puncture wound of left foot with foreign body, subsequent encounter    Past Medical History  Past Medical History:   Diagnosis Date    Arthritis     Atrial fibrillation ([address])     Diagnosed 11/2018    Benign prostate hyperplasia 04/2002    Cellulitis     right lower leg     Colon polyp 2006    Diabetes mellitus ([address])     Hearing aid worn     bilat    Hearing loss     90% loss left ear and 40% right ear    History of clubfoot     "since birth"    History of pneumonia     History of TIA (transient ischemic attack) 04/25/2017 11/30/18 pt denies    Hyperlipidemia 5/15/2014    Hypertension     Infectious viral hepatitis     "cant remember type"    Irregular heart beat     Afib    Neuropathy     both feet    Osteomyelitis ([address])     right great toe    Pneumonia     Right middle lobe pulmonary nodule 11/6/2018    Seasickness     Teeth missing     Type 2 diabetes mellitus with diabetic neuropathy ([address]) 11/6/2018    Wears glasses     reading    Wound, open     bottom of right foot     Past Surgical History  Past Surgical History:   Procedure Laterality Date    BUNIONECTOMY Right 12/4/2018    Procedure: 5TH METATARSAL BONE PARTIAL RESECTION, FULL THICKNESS DEBRIDEMENT OF DIABETIC ULCER;  Surgeon: Jaky Fenton DPM;  [facility];  Service: Podiatry    CLOSURE DELAYED PRIMARY Left 10/30/2023    Procedure: CLOSURE DELAYED PRIMARY left foot;  Surgeon: Christina Gillis DPM;  [facility];  Service: Podiatry    CLUB FOOT RELEASE Bilateral     COLONOSCOPY      COMPLEX WOUND CLOSURE TO EXTREMITY  4/27/2021    Procedure: COMPLEX WOUND CLOSURE TO EXTREMITY: RIGHT FOOT;  Surgeon: Naty Mir MD;  [facility];  Service: Plastics    EXTERNAL FIXATOR APPLICATION Right 6/96/6938    Procedure: Application multiplane external fixation;  Surgeon: Yordy Moore DPM;  [facility];  Service: Podiatry    FOOT HARDWARE REMOVAL Right 2/17/2021    Procedure: REMOVAL EXTERNAL FIXATOR;  Surgeon: Yordy Moore DPM;  [facility];  Service: Podiatry    FOREIGN BODY REMOVAL Right 4/27/2021    Procedure: REMOVAL FOREIGN BODY EXTREMITY: RIGHT FOOT;  Surgeon: Naty Mir MD;  [facility];  Service: Plastics    INCISION AND DRAINAGE OF WOUND Right 2/17/2021    Procedure: INCISION AND DRAINAGE (I&D) EXTREMITY;  Surgeon: Yordy Moore DPM;  [facility];  Service: Podiatry    INCISION AND DRAINAGE OF WOUND Left 10/26/2023    Procedure: INCISION AND DRAINAGE (I&D) EXTREMITY; WASHOUT; REMOVAL OF FOREIGN BODY;  Surgeon: Alejandra Valenzuela DPM;  [facility];  Service: Podiatry    ORIF FOOT FRACTURE Right 3/4/2021    Procedure: VAC PLACEMENT; SCREW FIXATION RIGHT FOOT FUSION;  Surgeon: Yordy Moore DPM;  [facility];  Service: Podiatry    OK AMPUTATION FOOT TRANSMETARSAL Right 2/22/2021    Procedure: AMPUTATION TRANSMETATARSAL (TMA), REMOVAL NAIL IM T2 ICF HARDWARE;  Surgeon: Yordy Moore DPM;  [facility];  Service: Podiatry    OK AMPUTATION METATARSAL W/TOE SINGLE Left 11/29/2023    Procedure: LEFT FOURTH RAY RESECTION FOOT;  Surgeon: Yordy Moore DPM;  [facility];  Service: Podiatry    OK ARTHRD MIDTARSL/TARS MLT/TRANSVRS W/OSTEOT Right 2/12/2021    Procedure: foot ARTHRODESIS/FUSION;  Surgeon: Yordy Moore DPM;  [facility];  Service: Podiatry    OK DEBRIDEMENT BONE MUSCLE &/FASCIA 20 SQ CM/< Right 12/2/2021    Procedure: DEBRIDEMENT OF TARSAL BONES WITH [facility];  Surgeon: Yordy Moore DPM;  [facility];  Service: Podiatry WY LNGTH/SHRT TENDON LEG/ANKLE 1 TENDON SPX Right 2/12/2021    Procedure: LENGTHEN TIBIAL TENDON, TRANS HEEL CORD;  Surgeon: Dennise Sparks DPM;  [facility];  Service: Podiatry    WY MUSC MYOCUTANEOUS/FASCIOCUTANEOUS FLAP TRUNK Right 4/12/2021    Procedure: RECONSTRUCTION MICROSURGICAL W/ FREE FLAP;  Surgeon: Harlan Buckner MD;  [facility];  Service: Plastics    WY MUSC MYOCUTANEOUS/FASCIOCUTANEOUS FLAP TRUNK Right 4/12/2021    Procedure: TAKEBACK RECONSTRUCTION MICROSURGICAL W/ FREE FLAP;  Surgeon: Harlan Buckner MD;  [facility];  Service: Plastics    WY MUSC MYOCUTANEOUS/FASCIOCUTANEOUS FLAP TRUNK Right 4/13/2021    Procedure: RECONSTRUCTION MICROSURGICAL W/ FREE FLAP, RE EXPLORATION, MICRO VASCULAR REVISION;  Surgeon: Harlan Buckner MD;  [facility];  Service: Plastics    WY OSTC PRTL EXOSTC/CONDYLC METAR HEAD Right 12/29/2020    Procedure: EXCISION EXOSTOSIS;  Surgeon: Dennise Sparks DPM;  [facility];  Service: Podiatry    WY SECONDARY CLOSURE SURG WOUND/DEHSN EXTSV/COMPLIC Right 84/26/1050    Procedure: PRIMARY DELAYED CLOSURE;  Surgeon: Dennise Sparks DPM;  [facility];  Service: Podiatry    TOE AMPUTATION Right     partial great toe    TONSILLECTOMY      VAC DRESSING APPLICATION Right 3/8/7151    Procedure: APPLICATION VAC DRESSING EXTREMITY;  Surgeon: Harlan Buckner MD;  [facility];  Service: [address] DEBRIDEMENT Right 3/1/2021    Procedure: DEBRIDEMENT LOWER EXTREMITY [facility] OUT); Surgeon: Harlan Buckner MD;  [facility];  Service: Plastics    WOUND DEBRIDEMENT Right 4/7/2021    Procedure: DEBRIDEMENT RIGHT FOOT;  Surgeon: Harlan Buckner MD;  [facility];  Service: Plastics    WOUND DEBRIDEMENT Right 4/12/2021    Procedure: DEBRIDEMENT LOWER EXTREMITY [facility] OUT);   Surgeon: Harlan Buckner MD;  [facility]; Service: Plastics    WOUND DEBRIDEMENT Right 4/27/2021    Procedure: DEBRIDEMENT LOWER EXTREMITY [facility] OUT): RIGHT FOOT;  Surgeon: Milagros Mckeon MD;  [facility];  Service: Plastics           12/01/23 1147   OT Last Visit   OT Visit Date 12/01/23   Note Type   Note type Re-Evaluation   Pain Assessment   Pain Assessment Tool 0-10   Pain Score No Pain   Restrictions/Precautions   Weight Bearing Precautions Per Order Yes   LLE Weight Bearing Per Order NWB   Braces or Orthoses Other (Comment)  (forefoot offloading shoe for L LE; modified shoe for R LE)   Other Precautions Impulsive; [address];Cane;Wheelchair-manual;Crutches  (Pt reports primarily using WC prior to admission and at home. He uses crutches for short distances.)   Additional Comments Please refer to pt's initial OT evaluation on 11/28 for pt's home set-up information. Prior Function   Comments Please prefer to pt's initial OT evaluation on 11/28 for pt's PLOF information. Lifestyle   Autonomy I adls and mobility - i iadls - shares homemaking with family - reports he was 1* using w/c to keep weight off L foot pta   Reciprocal Relationships supportive family   Service to Others retired   Intrinsic Gratification Pt enjoys sending time w/ his grandchildren. ADL   Eating Assistance 7  Independent   Grooming Assistance 7  Independent   UB Bathing Assistance 5  Supervision/Setup   LB Bathing Assistance 5  Supervision/Setup   UB Dressing Assistance 5  Supervision/Setup   LB Dressing Assistance 5  Supervision/Setup   LB Dressing Deficit Setup;Don/doff L shoe   Toileting Assistance  5  Supervision/Setup   Functional Assistance 5  Supervision/Setup   Bed Mobility   Supine to Sit 6  Modified independent   Additional items HOB elevated; Bedrails   Sit to Supine Unable to assess   Additional Comments Pt seated OOB in chair at end of OT evaluation w/ all needs within reach.    Transfers   Sit to Stand 5 Supervision   Additional items Increased time required   Stand to Sit 5  Supervision   Additional items Armrests; Increased time required   Additional Comments w/ crutches for support   Functional Mobility   Functional Mobility 5  Supervision   Additional Comments Pt ambulated few steps w/ S using crutches. Additional items Crutches   Balance   Static Sitting Fair +   Dynamic Sitting Fair +   Static Standing Fair   Dynamic Standing Fair   Ambulatory Fair   Activity Tolerance   Activity Tolerance Patient tolerated treatment well   Medical Staff Made Aware PT, Maciel Lora, and SPTQuique, due to pt's medical complexity and multiple comorbidities   Nurse Made Aware RN clearance prior to session   RUE Assessment   RUE Assessment WFL   LUE Assessment   LUE Assessment WFL   Hand Function   Gross Motor Coordination Functional   Fine Motor Coordination Functional   Cognition   Overall Cognitive Status WFL   Arousal/Participation Alert; Responsive; Cooperative   Attention Within functional limits   Orientation Level Oriented X4   Memory Within functional limits   Following Commands Follows all commands and directions without difficulty   Comments Pt was very pleasant and cooperative t/o session. Assessment   Assessment Pt is a 72 y.o. male seen for OT re-evaluation s/p L partial 4th ray resection. Per podiatry, pt NWB on L LE. Pt was admitted to [facility] on 11/27/[facility]23. Pt diagnosed with Osteomyelitis of foot, acute ([address]).  Pt has a significant PMH impacting occupational performance including: Arthritis, Atrial fibrillation ([address]), Benign prostate hyperplasia, Cellulitis, Colon polyp, Diabetes mellitus ([address]), Hearing aid worn, Hearing loss, History of clubfoot, History of pneumonia, History of TIA (transient ischemic attack), Hyperlipidemia, Hypertension, Infectious viral hepatitis, Irregular heart beat, Neuropathy, Osteomyelitis ([address]), Pneumonia, Right middle lobe pulmonary nodule, Seasickness, Teeth missing, Type 2 diabetes mellitus with diabetic neuropathy ([address]), Wears glasses, and Wound, open. Pt with active OT evaluation and treatment orders and activity orders. Please refer to pt's initial OT evaluation on 11/28 for pt's PLOF and home set-up information. During re-evaluation, pt was I for eating and grooming and S for UB ADLs, LB ADLs, and toileting. Pt was mod I for bed mobility and S for transfers and fxnl mobility w/ crutches. Pt performing ADLs at/near baseline level of independence and has good social support at home. No further acute OT needs identified at this time. Recommend continued active ADL participation and mobilization with hospital staff while in the hospital to increase pt’s endurance and strength upon D/C. From OT standpoint, recommend D/C to home with family support when medically cleared. D/C pt from OT caseload at this time. Goals   Patient Goals to go home   Plan   OT Frequency Eval only   Discharge Recommendation   Rehab Resource Intensity Level, OT No post-acute rehabilitation needs   AM-PAC Daily Activity Inpatient   Lower Body Dressing 3   Bathing 3   Toileting 3   Upper Body Dressing 3   Grooming 4   Eating 4   Daily Activity Raw Score 20   Daily Activity Standardized Score (Calc for Raw Score >=11) 42.03   AM-PAC Applied Cognition Inpatient   Following a Speech/Presentation 4   Understanding Ordinary Conversation 4   Taking Medications 4   Remembering Where Things Are Placed or Put Away 4   Remembering List of 4-5 Errands 4   Taking Care of Complicated Tasks 4   Applied Cognition Raw Score 24   Applied Cognition Standardized Score 62.21       The patient's raw score on the AM-PAC Daily Activity Inpatient Short Form is 20. A raw score of greater than or equal to 19 suggests the patient may benefit from discharge to home. Please refer to the recommendation of the Occupational Therapist for safe discharge planning.     Barb Mar, MS, OTR/L

## 2023-12-01 NOTE — PROGRESS NOTES
INTERNAL MEDICINE RESIDENCY PROGRESS NOTE     Name: Logan Graff Age & Sex: 72 y.o. male   MRN: [id number]  Unit/Bed#: [facility] 912-01   Encounter: [id number]  Team: SOD Team C     PATIENT INFORMATION     Name: Logan Reed. Age & Sex: 72 y.o. male   MRN: [id number]  Hospital Stay Days: 3    ASSESSMENT/PLAN     Principal Problem:    Osteomyelitis of foot, acute ([address])  Active Problems:    Type 2 diabetes mellitus with diabetic neuropathy ([address])    Puncture wound of left foot with foreign body, subsequent encounter    Essential hypertension    S/P transmetatarsal amputation of foot, right ([address])    Hyperlipidemia    Atrial fibrillation with RVR ([address])      * Osteomyelitis of foot, acute ([address])  Assessment & Plan  Pt presented on 11/27 with L foot wound in the setting of uncontrolled diabetes. Pt has neuropathy and has not felt any pain at the wound site. He was started on IV cefazolin on 11/28 out of concern for soft tissue infection. MRI on 11/29/23 showed definite osteomyelitis of his 4th metatarsal head and likely early onset osteomyelitis in the 4th toe proximal phalanx. Plantar soft tissue ulcer at the level of the fourth MTP joint with associated superficial soft tissue gas and probable necrotizing infection. No evidence of a discrete abscess. Wound culture growing Staph aureus and Peptostreptococcus. As per podiatry surgical note on 11/29/2 all nonviable soft tissue and bone able to be removed, primary closure achieved. ID consulted for recommendations on antibiotic course. Patient now transitioned to unasyn.      Plan:   Per ID switch to IV Unasyn while in hospital then PO Augmentin upon discharge for a total of 7 days following surgical source control  Appreciate PT/OT eval and recommendations   Monitor wound area post-op  Podiatry following, first dressing changed today, per podiatry stable from discharge from their standpoint  Follow up OR pathology    Puncture wound of left foot with foreign body, subsequent encounter  Assessment & Plan  Patient presenting with left fourth interdigital space wound status post incision and drainage on 10/27/2023, left foot delayed primary closure 10/30/2023, left dorsal medial second toe wound. These are both complications of type 2 diabetes neuropathy. Patient noted to have ascending erythema and localized edema with podiatry. Evaluated by podiatry in the ED who recommended initiation of IV antibiotics with eventual plan for or washout. Last Eliquis dose of a.m. of 11/27  [id number] of 5 does not require heparin bridge, but will initiate starting 11/27.    -Wound culture gram stain showed +1 gram positive cocci in pairs  -Wound culture positive for anaerobic, gram positive cocci as well as 2+ staph aureus. -X-ray consistent with possible osteomyelitis as well as soft tissue swelling  -MRI showing definite osteomyelitis involving the fourth metatarsal head and probable early osteomyelitis involving the fourth toe proximal phalanx, plantar soft tissue ulcer at the level of the fourth MTP joint with associated superficial soft tissue gas and probable necrotizing infection, no evidence of a discrete abscess  -No growth on 72 hour blood cultures     Plan:  On IV unasyn now, which is changed from cefazolin per ID recommendations  Podiatry following  Continue to follow blood cultures    Type 2 diabetes mellitus with diabetic neuropathy [facility])  Assessment & Plan  Lab Results   Component Value Date    HGBA1C 9.8 (H) 10/25/2023       Recent Labs     11/30/23  1647 11/30/23  2055 12/01/23  0744 12/01/23  1134   POCGLU 94 180* 100 254*       Blood Sugar Average: Last 72 hrs:  (P) 166    After clarifying with patient, home regimen is 40 units of lantus at bedtime, 6 units of lispro with meals and 500 mg metformin twice daily.       He states that his home fasting blood sugars in the morning are between 100-150 while taking 40 units nightly and for this reason, he has not decreased his nightly glargine as previously recommended by his physician. He states that his preprandial Accu-Cheks are 180s to 200s. Most recent HgbA1c is 9.8 in Oct 2023. As noted above this is the likely cause of chronic foot wounds and acute infection this admission. BG mostly within goal range this admission on regimen below. Plan:  Hold metformin while inpatient  Continue glargine on a reduced dose versus home dose at 30 units nightly for a 25% reduction from his preadmission needs  SSI 3 algorithm 3 times daily mealtime insulin with nighttime Accu-Cheks,   Carb controlled diet level 2  Hypoglycemia protocol  BG goal 140-180, monitor for tight glycemic control after OR to help with healing  When approaching discharge, can consider up titration of metformin with close follow-up with PCP depending on renal function    Essential hypertension  Assessment & Plan  Blood Pressure: 113/68  Home regimen: Cardizem 120 mg twice daily, lisinopril 40 mg daily, Toprol 100 mg twice daily    On admission, pt hypertensive to 213/103 likely in setting of acute injury and missed BP meds while in ED for several hours    Today patient has blood pressures in the 110s/60s. Improved from 130-140/70-90 yesterday. Patient's pressures seem to be more controlled with addition of chlorthalidone 25 mg. Initial hypertension may in part have been due to anxiety related to upcoming surgery. Plan:  Continue home regimen  Continue chlorthalidone 25 mg daily, will likely discharge on this  Add hydralazine 5 mg IV as needed every 6 hours for SBP greater than 160  Continue to monitor vitals and symptoms    S/P transmetatarsal amputation of foot, right ([facility])  Assessment & Plan  Routine local skin care and pressure offloading while sitting in bed to prevent ulcers    Atrial fibrillation with RVR ([facility])  Assessment & Plan  [id number] of 5. Takes Eliquis 5 mg twice daily as outpatient.   Last Eliquis dose on a.m. of 11/27  Strictly speaking, patient does not require heparin bridge for procedure, but given his multiple comorbidities will initiate on heparin drip for pre-op. Rate controlled during hospital stay. Patient restarted Eliquis  in the morning. Plan:  Transition back to Eliquis  Continue home diltiazem 120 mg twice daily and metoprolol 100 mg twice daily      Hyperlipidemia  Assessment & Plan  Lab Results   Component Value Date    CHOLESTEROL 140 2018    TRIG 148 2018    HDL 38 (L) 2018    LDLCALC 72 2018     Plan:  Continue home atorvastatin 40 mg p.o. daily  Consider repeating lipids in outpatient setting        Disposition: Medically stable for discharge per podiatry, possible discharge today or tomorrow, PT/OT to reevaluate after surgery for placement needs    SUBJECTIVE     Patient seen and examined. No acute events overnight. Patient feels well and has a good appetite. He has not had a bowel movement for two days but states he does not want to take senna due to his ability to still pass gas. He denies any pain at the surgical site, fevers, shortness of breath, nausea, vomiting, or diarrhea. OBJECTIVE     Vitals:    23 2200 23 2223 23 0744 23 0815   BP:  115/63 113/68 113/68   BP Location:  Left arm     Pulse:  74 68    Resp:   15    Temp:  99.4 °F (37.4 °C) 98.5 °F (36.9 °C)    TempSrc:  Oral     SpO2: 95% 95% 95%    Weight:       Height:          Temperature:   Temp (24hrs), Av °F (37.2 °C), Min:98.5 °F (36.9 °C), Max:99.4 °F (37.4 °C)    Temperature: 98.5 °F (36.9 °C)  Intake & Output:  I/O          07 0700  0701   07 07 0700    P. O.  240     I.V. (mL/kg) 510 (4.9)      Total Intake(mL/kg) 510 (4.9) 240 (2.3)     Urine (mL/kg/hr) 650 (0.3) 1675 (0.7) 700 (4.1)    Total Output 650 1675 700    Net -140 -1432 -700                 Weights:   IBW (Ideal Body Weight): 86.8 kg    Body mass index is 28 kg/m².   Weight (last 2 days) Date/Time    11/29/23 1729    Comment rows:    OBSERV: Xray completed at [facility] at 11/29/23 1729          Physical Exam  Constitutional:       General: He is not in acute distress. Appearance: Normal appearance. HENT:      Head: Normocephalic and atraumatic. Right Ear: External ear normal.      Left Ear: External ear normal.      Nose: Nose normal.      Mouth/Throat:      Mouth: Mucous membranes are moist.   Eyes:      Conjunctiva/sclera: Conjunctivae normal.   Cardiovascular:      Rate and Rhythm: Normal rate and regular rhythm. Heart sounds: Normal heart sounds. Pulmonary:      Effort: Pulmonary effort is normal.      Breath sounds: Normal breath sounds. Abdominal:      General: Bowel sounds are normal.      Palpations: Abdomen is soft. Musculoskeletal:         General: No swelling or tenderness. Right lower leg: No edema. Left lower leg: No edema. Comments: Left lower extremity wrapped in bandage, no visible oozing from bandage   Skin:     General: Skin is warm and dry. Neurological:      Mental Status: He is alert and oriented to person, place, and time. Mental status is at baseline. Psychiatric:         Mood and Affect: Mood normal.         Behavior: Behavior normal.         Thought Content: Thought content normal.       LABORATORY DATA     Labs: I have personally reviewed pertinent reports.   Results from last 7 days   Lab Units 12/01/23  0609 11/30/23  0526 11/29/23  0614   WBC Thousand/uL 6.96 9.64 8.42   HEMOGLOBIN g/dL 14.3 13.4 13.9   HEMATOCRIT % 43.6 41.1 44.5   PLATELETS Thousands/uL 197 203 212   NEUTROS PCT % 56 70 63   MONOS PCT % 12 9 9   EOS PCT % 6 3 3      Results from last 7 days   Lab Units 12/01/23  0609 11/30/23  0526 11/29/23  0614 11/28/23  0618 11/27/23  1810   POTASSIUM mmol/L 3.9 3.8 4.0 4.0 5.0   CHLORIDE mmol/L 104 103 103 104 106   CO2 mmol/L 26 28 29 28 23   BUN mg/dL 18 18 15 15 20   CREATININE mg/dL 1.02 0.95 0.89 0.84 0.95   CALCIUM mg/dL 9.1 9.0 9.4 8.7 9.6   ALK PHOS U/L  --   --   --  98 121*   ALT U/L  --   --   --  16 16   AST U/L  --   --   --  11* 18     Results from last 7 days   Lab Units 11/28/23  0618   MAGNESIUM mg/dL 1.9     Results from last 7 days   Lab Units 11/28/23  0618   PHOSPHORUS mg/dL 3.2      Results from last 7 days   Lab Units 11/28/23 2021 11/28/23  1233 11/28/23  0618 11/28/23  0013   INR   --   --  1.32* 1.29*   PTT seconds 71* 35 65* 32               IMAGING & DIAGNOSTIC TESTING     Radiology Results: I have personally reviewed pertinent reports. XR foot left 3+ views    Result Date: 11/30/2023  Impression: Postop left foot Workstation performed: [id number]     MRI foot/forefoot toes left w wo contrast    Result Date: 11/28/2023  Impression: Findings consistent with definite osteomyelitis involving the fourth metatarsal head and probable early osteomyelitis involving the fourth toe proximal phalanx. Plantar soft tissue ulcer at the level of the fourth MTP joint with associated superficial soft tissue gas and probable necrotizing infection. No evidence of a discrete abscess. The study was marked in [facility] for immediate notification. Workstation performed: [id number]     XR foot 3+ views LEFT    Result Date: 11/28/2023  Impression: There is some demineralization in the region of the head of the fourth metatarsal. Recommend MRI to exclude osteomyelitis. This has been scheduled. There is soft tissue swelling. Workstation performed: [id number]     Other Diagnostic Testing: I have personally reviewed pertinent reports.     ACTIVE MEDICATIONS     Current Facility-Administered Medications   Medication Dose Route Frequency    acetaminophen (TYLENOL) tablet 650 mg  650 mg Oral Q6H PRN    aluminum-magnesium hydroxide-simethicone (MAALOX) oral suspension 30 mL  30 mL Oral Q6H PRN    ampicillin-sulbactam (UNASYN) 3 g in sodium chloride 0.9 % 100 mL IVPB  3 g Intravenous Q6H    apixaban (ELIQUIS) tablet 5 mg  5 mg Oral BID atorvastatin (LIPITOR) tablet 40 mg  40 mg Oral Daily    chlorthalidone tablet 25 mg  25 mg Oral Daily    diltiazem (CARDIZEM) tablet 120 mg  120 mg Oral BID    hydrALAZINE (APRESOLINE) injection 5 mg  5 mg Intravenous Q6H PRN    insulin glargine (LANTUS) subcutaneous injection 30 Units 0.3 mL  30 Units Subcutaneous HS    insulin lispro (HumaLOG) 100 units/mL subcutaneous injection 1-5 Units  1-5 Units Subcutaneous HS    insulin lispro (HumaLOG) 100 units/mL subcutaneous injection 1-6 Units  1-6 Units Subcutaneous TID AC    insulin lispro (HumaLOG) 100 units/mL subcutaneous injection 5 Units  5 Units Subcutaneous TID With Meals    lisinopril (ZESTRIL) tablet 40 mg  40 mg Oral Daily    metoprolol succinate (TOPROL-XL) 24 hr tablet 100 mg  100 mg Oral BID    ondansetron (ZOFRAN) injection 4 mg  4 mg Intravenous Q6H PRN    oxyCODONE-acetaminophen (PERCOCET) 5-325 mg per tablet 1 tablet  1 tablet Oral Q4H PRN    senna (SENOKOT) tablet 8.6 mg  1 tablet Oral Daily    tamsulosin (FLOMAX) capsule 0.4 mg  0.4 mg Oral Daily With Dinner       VTE Pharmacologic Prophylaxis: eliquis  VTE Mechanical Prophylaxis: sequential compression device    Portions of the record may have been created with voice recognition software. Occasional wrong word or "sound a like" substitutions may have occurred due to the inherent limitations of voice recognition software.   Read the chart carefully and recognize, using context, where substitutions have occurred

## 2023-12-01 NOTE — PHYSICAL THERAPY NOTE
Physical Therapy Re-Evaluation     Patient's Name: Xavier Brown.     Admitting Diagnosis  Foot injury [S99.777X]  Puncture wound of left foot with foreign body, subsequent encounter [I51.826C]    Problem List  Patient Active Problem List   Diagnosis    Hyperlipidemia    Type 2 diabetes mellitus with diabetic neuropathy ([address])    Atrial fibrillation with RVR ([address])    Right middle lobe pulmonary nodule    Essential hypertension    Benign prostatic hyperplasia with lower urinary tract symptoms    Microalbuminuria    Cellulitis of extremity    Aftercare for amputation stump    Ambulatory dysfunction    S/P transmetatarsal amputation of foot, right (HCC)    Sleep disturbance    Osteomyelitis of foot, acute (HCC)    Anxiety    S/P R ALT flap to right foot    COVID-19    Gait abnormality    Vasomotor rhinitis    Corns and callosities    Tinea unguium    Cellulitis of left lower extremity    Puncture wound of left foot with foreign body, subsequent encounter       Past Medical History  Past Medical History:   Diagnosis Date    Arthritis     Atrial fibrillation ([address])     Diagnosed 11/2018    Benign prostate hyperplasia 04/2002    Cellulitis     right lower leg     Colon polyp 2006    Diabetes mellitus ([address])     Hearing aid worn     bilat    Hearing loss     90% loss left ear and 40% right ear    History of clubfoot     "since birth"    History of pneumonia     History of TIA (transient ischemic attack) 04/25/2017 11/30/18 pt denies    Hyperlipidemia 5/15/2014    Hypertension     Infectious viral hepatitis     "cant remember type"    Irregular heart beat     Afib    Neuropathy     both feet    Osteomyelitis ([address])     right great toe    Pneumonia     Right middle lobe pulmonary nodule 11/6/2018    Seasickness     Teeth missing     Type 2 diabetes mellitus with diabetic neuropathy ([address]) 11/6/2018    Wears glasses     reading    Wound, open     bottom of right foot       Past Surgical History  Past Surgical History: Procedure Laterality Date    BUNIONECTOMY Right 12/4/2018    Procedure: 5TH METATARSAL BONE PARTIAL RESECTION, FULL THICKNESS DEBRIDEMENT OF DIABETIC ULCER;  Surgeon: Pooja Villalba DPM;  [facility];  Service: Podiatry    CLOSURE DELAYED PRIMARY Left 10/30/2023    Procedure: CLOSURE DELAYED PRIMARY left foot;  Surgeon: Anali Penaloza DPM;  [facility];  Service: Podiatry    CLUB FOOT RELEASE Bilateral     COLONOSCOPY      COMPLEX WOUND CLOSURE TO EXTREMITY  4/27/2021    Procedure: COMPLEX WOUND CLOSURE TO EXTREMITY: RIGHT FOOT;  Surgeon: Mohit Rodríguez MD;  [facility];  Service: Plastics    EXTERNAL FIXATOR APPLICATION Right 4/14/1962    Procedure: Application multiplane external fixation;  Surgeon: Joshua Segal DPM;  [facility];  Service: Podiatry    FOOT HARDWARE REMOVAL Right 2/17/2021    Procedure: REMOVAL EXTERNAL FIXATOR;  Surgeon: Joshua Segal DPM;  [facility];  Service: Podiatry    FOREIGN BODY REMOVAL Right 4/27/2021    Procedure: REMOVAL FOREIGN BODY EXTREMITY: RIGHT FOOT;  Surgeon: Mohit Rodríguez MD;  [facility];  Service: Plastics    INCISION AND DRAINAGE OF WOUND Right 2/17/2021    Procedure: INCISION AND DRAINAGE (I&D) EXTREMITY;  Surgeon: Joshua Segal DPM;  [facility];  Service: Podiatry    INCISION AND DRAINAGE OF WOUND Left 10/26/2023    Procedure: INCISION AND DRAINAGE (I&D) EXTREMITY; WASHOUT; REMOVAL OF FOREIGN BODY;  Surgeon: Irma Martinez DPM;  [facility];  Service: Podiatry    ORIF FOOT FRACTURE Right 3/4/2021    Procedure: VAC PLACEMENT; SCREW FIXATION RIGHT FOOT FUSION;  Surgeon: Joshua Segal DPM;  [facility];  Service: Podiatry    AZ AMPUTATION FOOT TRANSMETARSAL Right 2/22/2021    Procedure: AMPUTATION TRANSMETATARSAL (TMA), REMOVAL NAIL IM T2 ICF HARDWARE;  Surgeon: Joshua Segal DPM;  [facility];  Service: Podiatry    AZ AMPUTATION METATARSAL W/TOE SINGLE Left 11/29/2023    Procedure: LEFT FOURTH RAY RESECTION FOOT;  Surgeon: Joseph Hernandez DPM;  [facility];  Service: Podiatry    TN ARTHRD MIDTARSL/TARS MLT/TRANSVRS W/OSTEOT Right 2/12/2021    Procedure: foot ARTHRODESIS/FUSION;  Surgeon: Joseph Hernandez DPM;  [facility];  Service: Podiatry    TN DEBRIDEMENT BONE MUSCLE &/FASCIA 20 SQ CM/< Right 12/2/2021    Procedure: DEBRIDEMENT OF TARSAL BONES WITH ANITBIODIC BEADS;  Surgeon: Joseph Hernandez DPM;  [facility];  Service: Podiatry    TN LNGTH/SHRT TENDON LEG/ANKLE 1 TENDON SPX Right 2/12/2021    Procedure: LENGTHEN TIBIAL TENDON, TRANS HEEL CORD;  Surgeon: Joseph Hernandez DPM;  [facility];  Service: Podiatry    TN MUSC MYOCUTANEOUS/FASCIOCUTANEOUS FLAP TRUNK Right 4/12/2021    Procedure: RECONSTRUCTION MICROSURGICAL W/ FREE FLAP;  Surgeon: Iva Dueñas MD;  [facility];  Service: Plastics    TN MUSC MYOCUTANEOUS/FASCIOCUTANEOUS FLAP TRUNK Right 4/12/2021    Procedure: TAKEBACK RECONSTRUCTION MICROSURGICAL W/ FREE FLAP;  Surgeon: Iva Dueñas MD;  [facility];  Service: Plastics    TN MUSC MYOCUTANEOUS/FASCIOCUTANEOUS FLAP TRUNK Right 4/13/2021    Procedure: RECONSTRUCTION MICROSURGICAL W/ FREE FLAP, RE EXPLORATION, MICRO VASCULAR REVISION;  Surgeon: Iva Dueñas MD;  [facility];  Service: Plastics    TN OSTC PRTL EXOSTC/CONDYLC METAR HEAD Right 12/29/2020    Procedure: EXCISION EXOSTOSIS;  Surgeon: Joseph Hernandez DPM;  [facility];  Service: Podiatry    TN SECONDARY CLOSURE SURG WOUND/DEHSN EXTSV/COMPLIC Right 19/54/8970    Procedure: PRIMARY DELAYED CLOSURE;  Surgeon: Joseph Hernandez DPM;  [facility];  Service: Podiatry    TOE AMPUTATION Right     partial great toe    TONSILLECTOMY      VAC DRESSING APPLICATION Right 4/2/8112    Procedure: APPLICATION VAC DRESSING EXTREMITY;  Surgeon: Iva Dueñas MD;  [facility];  Service: [address] DEBRIDEMENT Right 3/1/2021    Procedure: DEBRIDEMENT LOWER EXTREMITY [facility] OUT); Surgeon: Harlan Buckner MD;  [facility];  Service: Plastics    WOUND DEBRIDEMENT Right 4/7/2021    Procedure: DEBRIDEMENT RIGHT FOOT;  Surgeon: Harlan Buckner MD;  [facility];  Service: Plastics    WOUND DEBRIDEMENT Right 4/12/2021    Procedure: DEBRIDEMENT LOWER EXTREMITY [facility] OUT); Surgeon: Harlan Buckner MD;  [facility];  Service: Plastics    WOUND DEBRIDEMENT Right 4/27/2021    Procedure: DEBRIDEMENT LOWER EXTREMITY ([address] [place]): RIGHT FOOT;  Surgeon: Harlan Buckner MD;  [facility];  Service: Plastics        12/01/23 1146   PT Last Visit   PT Visit Date 12/01/23   Note Type   Note type Re-Evaluation   Pain Assessment   Pain Assessment Tool 0-10   Pain Score No Pain   Restrictions/Precautions   Weight Bearing Precautions Per Order Yes   LLE Weight Bearing Per Order NWB   Braces or Orthoses Other   (forefoot offloading shoe on LLE; RLE club foot (has modified shoe))   Other Precautions Fall Risk   Home Living   Type of [address] [place] [address] One level;Ramped entrance;Performs ADLs on one level; Able to live on main level with bedroom/bathroom   Bathroom Shower/Tub Walk-in shower   Bathroom Toilet Standard   [facility] Walker;Cane;Wheelchair-manual;Crutches  (reports primarily using WC PTA , uses crutches for short distances)   Additional Comments please refer to initial evaluation   Prior Function   Level of [place] Independent with ADLs; Independent with functional mobility; Independent with IADLS   Lives With Spouse; Family   Receives Help From Family   IADLs Independent with medication management; Independent with meal prep   Falls in the last 6 months 0   Vocational Retired   General   Family/Caregiver Present No   Cognition Overall Cognitive Status WFL   Arousal/Participation Cooperative   Following Commands Follows all commands and directions without difficulty   Comments pleasant and cooperative; impulsive with mobility- likely 2* independent status. Educated to pace self with mobility tasks for improved safety    RLE Assessment   RLE Assessment WFL   LLE Assessment   LLE Assessment WFL   Bed Mobility   Supine to Sit 6  Modified independent   Additional items Bedrails   Sit to Supine Unable to assess   Additional Comments pt supine in bed upon arrival. Pt left sititng OOB in recliner with all needs wihtin reach   Transfers   Sit to Stand 5  Supervision   Additional items Increased time required;Armrests   Stand to Sit 5  Supervision   Additional items Armrests; Increased time required   Additional Comments transfers with crutches   Ambulation/Elevation   Gait pattern Step to;Decreased foot clearance   Gait Assistance 5  Supervision   Assistive Device Crutches   Distance 2 x 5' in room   Balance   Static Sitting Fair +   Dynamic Sitting Fair +   Static Standing Fair   Dynamic Standing Fair   Ambulatory Fair  (crutches)   Endurance Deficit   Endurance Deficit Yes   Activity Tolerance   Activity Tolerance Patient tolerated treatment well   Medical Staff Made Aware OT [address] [place] [address]; co-session completed this date 2* increased medical complexity   Nurse Made Aware RN cleared pt to participate in therapy sesison   Assessment   Prognosis Good   Problem List Decreased skin integrity   Assessment Pt seen for PT re-evaluation 2* sx procedure. Pt now s/p left partial 4th ray resection, per podiatry NWB on LLE. Pt with active PT eval/treat and up and OOB as tolerated orders. Pt is a 72 y.o. male who was admitted to [address] [place] [address] on 11/27 with osteomyelitis of foot.  Pt  has a past medical history of Arthritis, Atrial fibrillation ([facility]), Benign prostate hyperplasia (04/2002), Cellulitis, Colon polyp (2006), Diabetes mellitus ([facility]), Hearing aid worn, Hearing loss, History of clubfoot, History of pneumonia, History of TIA (transient ischemic attack) (04/25/2017), Hyperlipidemia (5/15/2014), Hypertension, Infectious viral hepatitis, Irregular heart beat, Neuropathy, Osteomyelitis ([address]), Pneumonia, Right middle lobe pulmonary nodule (11/6/2018), Seasickness, Teeth missing, Type 2 diabetes mellitus with diabetic neuropathy ([address]) (11/6/2018), Wears glasses, and Wound, open. Pt resides with family in [facility] with ramped entrance and was independent using crutches vs WC prior to hospital admission. Upon re-evaluation pt is performing bed mobility tasks with mod I level, functional transfers with S level and is ambulating with S level using crutches. Pt was left sitting OOB in recliner at the end of PT session with all needs in reach. Pt with no questions or concerns regarding d/c home at this time; please re-consult if needed. PT to d/c pt and recommends home with family support + OPPT when cleared by podiatry. The patient's AM-PAC Basic Mobility Inpatient Short Form Raw Score is 20, Standardized Score is 43.99. A standardized score greater than 42.9 suggests the patient may benefit from discharge to home. Please also refer to the recommendation of the Physical Therapist for safe discharge planning.    Goals   Patient Goals to go home   Plan   PT Frequency   (re-eval only- d/c PT)   Discharge Recommendation   Rehab Resource Intensity Level, PT III (Minimum Resource Intensity)   Equipment Recommended Wheelchair;Walker;Crutches  (pt owns all necessary DME)   AM-PAC Basic Mobility Inpatient   Turning in Flat Bed Without Bedrails 4   Lying on Back to Sitting on Edge of Flat Bed Without Bedrails 4   Moving Bed to Chair 3   Standing Up From Chair Using Arms 3   Walk in Room 3   Climb 3-5 Stairs With Railing 3   Basic Mobility Inpatient Raw Score 20   Basic Mobility Standardized Score 43.99   Highest Level Of Mobility   JH-HLM Goal 6: Walk 10 steps or more   JH-HLM Achieved 6: Walk 10 steps or more   Modified [place] Scale   Modified [place] Scale 2           Wilma Son, PT DPT

## 2023-12-01 NOTE — PROGRESS NOTES
Progress Note - Infectious Disease   Adolm Men. 72 y.o. male MRN: [id number]  Unit/Bed#: [facility] 503-40 Encounter: [id number]      IMPRESSION & RECOMMENDATIONS:   Impression/Recommendations:  1. Left foot dehiscence c/b cellulitis and underlying osteomyelitis/septic arthritis. Now status post left fourth ray resection  with presumed surgical cure. No OR cultures but preoperative cultures isolated MSSA and Peptoniphilus. Patient remains afebrile and clinically stable. Admission blood cultures are negative. Incision site is stable on reevalution.      -Continue IV Unasyn  -Podiatry follow-up ongoing  -On discharge, anticipate transition to oral Augmentin to complete 7-day postoperative course, through .     2.  Left foot puncture wound infection status post I&D 10/27/2023 followed by [facility] 10/30/2023. Wound cultures at that time isolated MSSA. Now complicated by #1. Plan as above. 3.  DM 2, with long-term insulin use and neuropathy. Risk factor for poor wound healing and infection. 4.  Prior right TMA. Antibiotics:  Cefazolin 4  POD2         Subjective:  Patient seen on AM rounds. Denies fevers, chills, or sweats. Denies nausea, vomiting, or diarrhea. Foot was reevaluated by podiatry. Objective:  Vitals:  Temp:  [98.5 °F (36.9 °C)-99.4 °F (37.4 °C)] 98.5 °F (36.9 °C)  HR:  [68-94] 68  Resp:  [15-20] 15  BP: (113-136)/(63-97) 113/68  SpO2:  [94 %-97 %] 95 %  Temp (24hrs), Av °F (37.2 °C), Min:98.5 °F (36.9 °C), Max:99.4 °F (37.4 °C)  Current: Temperature: 98.5 °F (36.9 °C)    Physical Exam:   General:  No acute distress, nontoxic  HEENT: AT/NC  Psychiatric:  Awake and alert  Pulmonary:  Normal respiratory excursion without accessory muscle use  Abdomen:  Soft, nontender  Extremities: Foot dressing is intact. Media image was personally reviewed. Skin:  No rashes  Neuro: moves all exts    Lab Results:  I have personally reviewed pertinent labs.   Results from last 7 days   Lab Units 12/01/23  0609 11/30/23  0526 11/29/23  0614 11/28/23  0618 11/27/23  1810   POTASSIUM mmol/L 3.9 3.8 4.0 4.0 5.0   CHLORIDE mmol/L 104 103 103 104 106   CO2 mmol/L 26 28 29 28 23   BUN mg/dL 18 18 15 15 20   CREATININE mg/dL 1.02 0.95 0.89 0.84 0.95   EGFR ml/min/1.73sq m 73 80 86 88 80   CALCIUM mg/dL 9.1 9.0 9.4 8.7 9.6   AST U/L  --   --   --  11* 18   ALT U/L  --   --   --  16 16   ALK PHOS U/L  --   --   --  98 121*     Results from last 7 days   Lab Units 12/01/23  0609 11/30/23  0526 11/29/23  0614   WBC Thousand/uL 6.96 9.64 8.42   HEMOGLOBIN g/dL 14.3 13.4 13.9   PLATELETS Thousands/uL 197 203 212     Results from last 7 days   Lab Units 11/27/23  2144 11/27/23  2050   BLOOD CULTURE  No Growth at 72 hrs. No Growth at 72 hrs.  --    GRAM STAIN RESULT   --  1+ Polys*  1+ Gram positive cocci in pairs*   WOUND CULTURE   --  2+ Growth of Staphylococcus aureus*  2+ Growth of       Imaging Studies:   I have personally reviewed pertinent imaging study reports and images in PACS. EKG, Pathology, and Other Studies:   I have personally reviewed pertinent reports.

## 2023-12-01 NOTE — DISCHARGE INSTR - AVS FIRST PAGE
Discharge Instructions - Podiatry    Weight Bearing Status: Non-weight bearing to left foot                   Pain: Continue analgesics as needed    Follow-up appointment instructions: Please make an appointment within one week of discharge with Dr. Kamila Melchor. Contact sooner if any increase in pain, or signs of infection occur    Wound Care: Leave dressings clean, dry, and intact between dressing changes    Nursing Instructions:  Please leave dressing intact until your first post operative appointment. If the dressing becomes soiled or wet, please change. Please apply Betadine soaked non-adhesive gauze. Then cover with Gauze and secure with rolled gauze and an ACE wrap. Please note the following medication changes:   Begin taking chlorthalidone 1 tablet (25 mg) once per day. Please measure your blood pressures at home, if the top number is below 90 you can stop taking this medication until you see your primary doctor. You will take the antibiotic called Augmentin for 11 doses following discharge. On the day of discharge you will take 1 tablet with dinner. After this, take 1 tablet twice per day until you finish all of the medication. On the last day you will only take 1 tablet in the morning and then you will be finished. Continue taking all of your other medications as you did previously. Please follow-up with your primary doctor next week. Follow-up with podiatry as scheduled for ongoing management of your left foot.

## 2023-12-02 ENCOUNTER — HOME CARE VISIT (OUTPATIENT)
Dept: HOME HEALTH SERVICES | Facility: HOME HEALTHCARE | Age: 73
End: 2023-12-02
Payer: COMMERCIAL

## 2023-12-02 NOTE — CASE COMMUNICATION
1205. Ibeth Cabrera Patient called into office regarding scheduling. Notified patient that he is on schedule for tomorrow, Subhash 12/3/23. Patient verbalized understanding.

## 2023-12-03 ENCOUNTER — HOME CARE VISIT (OUTPATIENT)
Dept: HOME HEALTH SERVICES | Facility: HOME HEALTHCARE | Age: 73
End: 2023-12-03
Payer: COMMERCIAL

## 2023-12-03 VITALS
OXYGEN SATURATION: 96 % | RESPIRATION RATE: 18 BRPM | HEART RATE: 73 BPM | TEMPERATURE: 96.9 F | SYSTOLIC BLOOD PRESSURE: 128 MMHG | DIASTOLIC BLOOD PRESSURE: 70 MMHG

## 2023-12-03 LAB
BACTERIA BLD CULT: NORMAL
BACTERIA BLD CULT: NORMAL

## 2023-12-03 PROCEDURE — G0299 HHS/HOSPICE OF RN EA 15 MIN: HCPCS

## 2023-12-04 ENCOUNTER — TELEPHONE (OUTPATIENT)
Age: 73
End: 2023-12-04

## 2023-12-04 ENCOUNTER — TRANSITIONAL CARE MANAGEMENT (OUTPATIENT)
Dept: FAMILY MEDICINE CLINIC | Facility: CLINIC | Age: 73
End: 2023-12-04

## 2023-12-04 NOTE — TELEPHONE ENCOUNTER
Caller: Tamika Sultana    Doctor/Office: Dr. Tom Manuel    #: [phone]    Escalation: Surgery Patient needs a forced post op with Dr. Tom Manuel this week at either [facility] office. Please return call.  Thank you

## 2023-12-04 NOTE — UTILIZATION REVIEW
NOTIFICATION OF ADMISSION DISCHARGE   This is a Notification of Discharge from [facility]. Please be advised that this patient has been discharge from our facility. Below you will find the admission and discharge date and time including the patient’s disposition. UTILIZATION REVIEW CONTACT:  Justin Gonzalez  Utilization   Network Utilization Review Department  Phone: [phone] x carefully listen to the prompts. All voicemails are confidential.  Email: [email or web address]     ADMISSION INFORMATION  PRESENTATION DATE: 11/27/2023  5:10 PM  OBERVATION ADMISSION DATE:   INPATIENT ADMISSION DATE: 11/28/23  2:26 PM   DISCHARGE DATE: 12/1/2023  2:20 PM   DISPOSITION:Home with [facility] Utilization Review Department  ATTENTION: Please call with any questions or concerns to [phone] and carefully listen to the prompts so that you are directed to the right person. All voicemails are confidential.   For Discharge needs, contact Care Management DC Support Team at [phone] opt. 2  Send all requests for admission clinical reviews, approved or denied determinations and any other requests to dedicated fax number below belonging to the campus where the patient is receiving treatment.  List of dedicated fax numbers for the Facilities:  [place] DENIALS (Administrative/Medical Necessity) [phone]   DISCHARGE SUPPORT TEAM (Network) [phone] [facility] (Maternity/NICU/Pediatrics) [phone]   [address] [facility] [address] [place] [address] [address] [phone]

## 2023-12-04 NOTE — ED ATTENDING ATTESTATION
Final Diagnoses:     1. Puncture wound of left foot with foreign body, subsequent encounter    2. Cellulitis of left lower extremity    3. Osteomyelitis of foot, acute ([address])    4. Type 2 diabetes mellitus with diabetic neuropathy, without long-term current use of insulin ([address])    5. Essential hypertension           I, Gypsy Osler, DO, saw and evaluated the patient. All available labs and X-rays were ordered by me or the resident / non-physician and have been reviewed by myself. I discussed the patient with the resident / non-physician and agree with the resident's / non-physician practitioner's findings and plan as documented in the resident's / non-physician practicitioner's note, except where noted. At this point, I agree with the current assessment done in the ED. I was present during key portions of all procedures performed unless otherwise stated. Nursing Triage:     Chief Complaint   Patient presents with    Wound Check     Wound care nurse came to house and told patient to come to ER for wound check. HPI:   This is a 72 y.o. male presenting for evaluation of for woudn check of left foot. Seen by podiatry on 10/2 for diabetic ulcer. Wound care nurse saw him today and told to come to ED. Worsening redness swelling in area    ASSESSMENT + PLAN:   Infected diabetic ulcer s/p I/d washout  Labs xr for osteomyelitis,  Discuss with podiatry    Physical:     Vital signs reviewed. Gen. appearance: No acute distress. Alert and oriented x3. Head: Atraumatic, normocephalic. Eyes: EOMI, PERRLA. No icterus. Neck: Supple, no lymphadenopathy, full range of motion. Chest: Regular rate and rhythm. Lungs are clear to auscultation bilaterally. Nontender. Abdomen: Soft nontender nondistended. No signs of ecchymosis. Positive bowel sounds. Extremities: Full range of motion in all extremities. DTRs intact. Neuro: Cranial nerves II through XII intact. Nonfocal exam. GCS 15  Skin: Warm, dry. Vital signs reviewed. Past Medical:    has a past medical history of Arthritis, Atrial fibrillation ([address]), Benign prostate hyperplasia (04/2002), Cellulitis, Colon polyp (2006), Diabetes mellitus ([address]), Hearing aid worn, Hearing loss, History of clubfoot, History of pneumonia, History of TIA (transient ischemic attack) (04/25/2017), Hyperlipidemia (5/15/2014), Hypertension, Infectious viral hepatitis, Irregular heart beat, Neuropathy, Osteomyelitis ([address]), Pneumonia, Right middle lobe pulmonary nodule (11/6/2018), Seasickness, Teeth missing, Type 2 diabetes mellitus with diabetic neuropathy ([address]) (11/6/2018), Wears glasses, and Wound, open. Past Surgical:    has a past surgical history that includes Vasectomy (1992); Toe amputation (Right); Colonoscopy; pr ostc prtl exostc/condylc metar head (Right, 12/29/2020); pr secondary closure surg wound/dehsn extsv/complic (Right, 00/18/9189); Club foot release (Bilateral); Bunionectomy (Right, 12/4/2018); pr arthrd midtarsl/tars mlt/transvrs w/osteot (Right, 2/12/2021); pr lngth/shrt tendon leg/ankle 1 tendon spx (Right, 2/12/2021); External fixator application (Right, 8/88/4079); Incision and drainage of wound (Right, 2/17/2021); Foot hardware removal (Right, 2/17/2021); pr amputation foot transmetarsal (Right, 2/22/2021); Wound debridement (Right, 3/1/2021); VAC DRESSING APPLICATION (Right, 0/6/9858); ORIF foot fracture (Right, 3/4/2021); Tonsillectomy; Wound debridement (Right, 4/7/2021); pr musc myocutaneous/fasciocutaneous flap trunk (Right, 4/12/2021); Wound debridement (Right, 4/12/2021); pr musc myocutaneous/fasciocutaneous flap trunk (Right, 4/12/2021); pr musc myocutaneous/fasciocutaneous flap trunk (Right, 4/13/2021); Wound debridement (Right, 4/27/2021); Foreign Body Removal (Right, 4/27/2021); COMPLEX WOUND CLOSURE TO EXTREMITY (4/27/2021); pr debridement bone muscle &/fascia 20 sq cm/< (Right, 12/2/2021);  Incision and drainage of wound (Left, 10/26/2023); CLOSURE DELAYED PRIMARY (Left, 10/30/2023); and pr amputation metatarsal w/toe single (Left, 11/29/2023). XR foot left 3+ views   Final Result by Jennifer Dobbs MD (11/30 1433)      Postop left foot            Workstation performed: [id number]         MRI foot/forefoot toes left w wo contrast   Final Result by Jaja Esteban MD (11/28 5865)      Findings consistent with definite osteomyelitis involving the fourth metatarsal head and probable early osteomyelitis involving the fourth toe proximal phalanx. Plantar soft tissue ulcer at the level of the fourth MTP joint with associated superficial soft tissue gas and probable necrotizing infection. No evidence of a discrete abscess. The study was marked in [facility] for immediate notification. Workstation performed: [id number]         XR foot 3+ views LEFT   Final Result by Rosalie Bernabe MD (11/28 5214)      There is some demineralization in the region of the head of the fourth metatarsal. Recommend MRI to exclude osteomyelitis. This has been scheduled. There is soft tissue swelling. Workstation performed: [id number]             I reviewed patient's most recent discharge summary and/or outpatient office notes. Portions of the record may have been created with voice recognition software. Occasional wrong word or "sound a like" substitutions may have occurred due to the inherent limitations of voice recognition software. Read the chart carefully and recognize, using context, where substitutions have occurred.     Electronically signed:  Pierre Watts DO

## 2023-12-05 ENCOUNTER — TELEPHONE (OUTPATIENT)
Age: 73
End: 2023-12-05

## 2023-12-05 ENCOUNTER — HOME CARE VISIT (OUTPATIENT)
Dept: HOME HEALTH SERVICES | Facility: HOME HEALTHCARE | Age: 73
End: 2023-12-05
Payer: COMMERCIAL

## 2023-12-05 LAB
MYCOBACTERIUM SPEC CULT: NORMAL
RHODAMINE-AURAMINE STN SPEC: NORMAL

## 2023-12-05 NOTE — TELEPHONE ENCOUNTER
Caller: Jaleel Greene     Doctor/Office: Dr. Fausto Becerril    #: [phone]    Escalation: Appointment/Pt is not happy with the answer that Dr Paco Cash gave as far as a forced appt. He was never called back and told this so I read him the Dr's answer. He wants to be seen sooner than 14 days and said why do we have [facility] coming to his home if they do nothing but look at bandage and say no blood and leave. He has lost all toes on other foot and said he does not want to lose anymore toes--also said he needs to be on ABX since SX and was not given any either/oversight. Please call back and advise.  Thanks

## 2023-12-06 PROCEDURE — 88311 DECALCIFY TISSUE: CPT | Performed by: STUDENT IN AN ORGANIZED HEALTH CARE EDUCATION/TRAINING PROGRAM

## 2023-12-06 PROCEDURE — 88305 TISSUE EXAM BY PATHOLOGIST: CPT | Performed by: STUDENT IN AN ORGANIZED HEALTH CARE EDUCATION/TRAINING PROGRAM

## 2023-12-06 NOTE — TELEPHONE ENCOUNTER
I spoke with Oj Motley and relayed 's response. He verbalized understanding and will keep the appt for next week.

## 2023-12-07 ENCOUNTER — HOME CARE VISIT (OUTPATIENT)
Dept: HOME HEALTH SERVICES | Facility: HOME HEALTHCARE | Age: 73
End: 2023-12-07
Payer: COMMERCIAL

## 2023-12-07 VITALS
OXYGEN SATURATION: 94 % | SYSTOLIC BLOOD PRESSURE: 128 MMHG | TEMPERATURE: 98.4 F | RESPIRATION RATE: 18 BRPM | DIASTOLIC BLOOD PRESSURE: 72 MMHG | HEART RATE: 56 BPM

## 2023-12-07 PROCEDURE — G0299 HHS/HOSPICE OF RN EA 15 MIN: HCPCS

## 2023-12-08 ENCOUNTER — OFFICE VISIT (OUTPATIENT)
Dept: FAMILY MEDICINE CLINIC | Facility: CLINIC | Age: 73
End: 2023-12-08
Payer: COMMERCIAL

## 2023-12-08 VITALS
SYSTOLIC BLOOD PRESSURE: 132 MMHG | TEMPERATURE: 97.6 F | HEIGHT: 76 IN | WEIGHT: 230 LBS | HEART RATE: 62 BPM | DIASTOLIC BLOOD PRESSURE: 76 MMHG | BODY MASS INDEX: 28.01 KG/M2 | OXYGEN SATURATION: 94 %

## 2023-12-08 DIAGNOSIS — I48.91 ATRIAL FIBRILLATION WITH RVR (HCC): ICD-10-CM

## 2023-12-08 DIAGNOSIS — I10 ESSENTIAL HYPERTENSION: Chronic | ICD-10-CM

## 2023-12-08 DIAGNOSIS — S91.342D PUNCTURE WOUND OF LEFT FOOT WITH FOREIGN BODY, SUBSEQUENT ENCOUNTER: Primary | ICD-10-CM

## 2023-12-08 PROCEDURE — 99495 TRANSJ CARE MGMT MOD F2F 14D: CPT | Performed by: FAMILY MEDICINE

## 2023-12-08 RX ORDER — CHLORTHALIDONE 25 MG/1
25 TABLET ORAL DAILY
Qty: 90 TABLET | Refills: 1 | Status: SHIPPED | OUTPATIENT
Start: 2023-12-08 | End: 2024-03-07

## 2023-12-08 NOTE — PROGRESS NOTES
Assessment & Plan     1. 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 Has wound care followup, and podiatry on 12/14.  - finished augmetin 12/06  - sugars well controlled, BP controlled. 2. Essential hypertension  Assessment & Plan:  BP: 132/76, new med is chlorthalidone 25mg daily. Orders:  -     chlorthalidone 25 mg tablet; Take 1 tablet (25 mg total) by mouth daily    3. Atrial fibrillation with RVR ([address])  Assessment & Plan:  In donut hole for eliquis, will continue for now. BMI Counseling: Body mass index is 28 kg/m². The BMI is above normal. Nutrition recommendations include decreasing portion sizes, decreasing fast food intake, limiting drinks that contain sugar, reducing intake of saturated and trans fat and reducing intake of cholesterol. Exercise recommendations include moderate physical activity 150 minutes/week and exercising 3-5 times per week. Rationale for BMI follow-up plan is due to patient being overweight or obese. Subjective     Transitional Care Management Review:   Logan Graff is a 72 y.o. male here for TCM follow up. During the TCM phone call patient stated:  TCM Call     Date and time call was made  12/4/2023  2:46 PM    Hospital care reviewed  Records reviewed    Patient was hospitialized at  [address]  went to [facility] rehab 4/30/21-5/12/21    Date of Admission  11/27/23    Date of discharge  12/01/23    Diagnosis  Right foot amputation    Disposition  Home    Were the patients medications reviewed and updated  Yes    Current Symptoms  None      TCM Call     Post hospital issues  None    Should patient be enrolled in anticoag monitoring? No    Scheduled for follow up?   Yes    Patients specialists  Other (comment)    Other specialists names  Wound Care, Infectious Disease, Podiatry    Did you obtain your prescribed medications  Yes    Do you need Care Management Follow Up    Length of Stay (days): 0    Expected Discharge Date: 01/18/21     Concerns to be Addressed: discharge planning, denies needs/concerns at this time     Patient plan of care discussed at interdisciplinary rounds: Yes    Anticipated Discharge Disposition: Home     Anticipated Discharge Services: Home Care PT  Anticipated Discharge DME: None    Patient/family educated on Medicare website which has current facility and service quality ratings: Yes   Education Provided on the Discharge Plan:  yes  Patient/Family in Agreement with the Plan: yes    Referrals Placed by CM/SW:  Home Care  Private pay costs discussed: Not applicable    Additional Information:  Therapy recommending home care PT. Discussed with patient, who is understanding and agreeable. Patient has chosen [facility]. Referral sent and contact information updated to [facility].    Please consult CM for any additional needs.       Christine Anaya, RN      Christine Anaya RN Case Manager  Inpatient Care Coordination  [facility]   [phone]     help managing your prescriptions or medications  No    Is transportation to your appointment needed  No    I have advised the patient to call PCP with any new or worsening symptoms  [address] members    Support System  Family; Spouse    The type of support provided  Emotional; Physical; Financial    Do you have social support  Yes, as much as I need    Are you recieving any outpatient services  No    Are you recieving home care services  Yes    Types of home care services  Nurse visit    Are you using any community resources  No    Current waiver services  No    Have you fallen in the last 12 months  No    Interperter language line needed  No    Counseling  Patient    Counseling topics  Activities of daily living; Importance of RX compliance        Left foot 4th digit amputation after puncture wound with glass, was not healing well. Doing well now. No drainage, followup with home wound care nursing and has podiatry followup next 12/14. Review of Systems   Constitutional:  Negative for chills and fever. HENT:  Negative for ear pain and sore throat. Eyes:  Negative for pain and visual disturbance. Respiratory:  Negative for cough and shortness of breath. Cardiovascular:  Negative for chest pain and palpitations. Gastrointestinal:  Negative for abdominal pain and vomiting. Genitourinary:  Negative for dysuria and hematuria. Musculoskeletal:  Negative for arthralgias and back pain. Skin:  Positive for wound. Negative for color change and rash. Neurological:  Negative for seizures and syncope. All other systems reviewed and are negative. Objective     /76 (BP Location: Left arm, Patient Position: Sitting, Cuff Size: Adult)   Pulse 62   Temp 97.6 °F (36.4 °C) (Temporal)   Ht 6' 4" (1.93 m)   Wt 104 kg (230 lb)   SpO2 94%   BMI 28.00 kg/m²      Physical Exam  Vitals reviewed. Constitutional:       Appearance: Normal appearance.  He is not ill-appearing. HENT:      Head: Normocephalic and atraumatic. Nose: Nose normal.      Mouth/Throat:      Mouth: Mucous membranes are moist.      Pharynx: Oropharynx is clear. Eyes:      Conjunctiva/sclera: Conjunctivae normal.      Pupils: Pupils are equal, round, and reactive to light. Cardiovascular:      Rate and Rhythm: Normal rate and regular rhythm. Pulses: Normal pulses. Heart sounds: Normal heart sounds. No murmur heard. Pulmonary:      Effort: Pulmonary effort is normal.      Breath sounds: Normal breath sounds. No wheezing. Abdominal:      General: Bowel sounds are normal.      Palpations: Abdomen is soft. Musculoskeletal:         General: Tenderness and signs of injury present. Cervical back: Normal range of motion. Neurological:      Mental Status: He is alert and oriented to person, place, and time. Motor: Weakness present. Gait: Gait abnormal.   Psychiatric:         Mood and Affect: Mood normal.         Behavior: Behavior normal.         Thought Content:  Thought content normal.         Judgment: Judgment normal.       Medications have been reviewed by provider in current encounter    Sariah Duval DO

## 2023-12-08 NOTE — ASSESSMENT & PLAN NOTE
S/p amputation of L 4th digit of foot. Has wound care followup, and podiatry on 12/14.  - finished augmetin 12/06  - sugars well controlled, BP controlled.

## 2023-12-12 ENCOUNTER — HOME CARE VISIT (OUTPATIENT)
Dept: HOME HEALTH SERVICES | Facility: HOME HEALTHCARE | Age: 73
End: 2023-12-12
Payer: COMMERCIAL

## 2023-12-12 VITALS
SYSTOLIC BLOOD PRESSURE: 152 MMHG | TEMPERATURE: 98.6 F | OXYGEN SATURATION: 100 % | RESPIRATION RATE: 18 BRPM | HEART RATE: 84 BPM | DIASTOLIC BLOOD PRESSURE: 84 MMHG

## 2023-12-12 LAB
MYCOBACTERIUM SPEC CULT: NORMAL
RHODAMINE-AURAMINE STN SPEC: NORMAL

## 2023-12-12 PROCEDURE — G0299 HHS/HOSPICE OF RN EA 15 MIN: HCPCS

## 2023-12-14 ENCOUNTER — HOME CARE VISIT (OUTPATIENT)
Dept: HOME HEALTH SERVICES | Facility: HOME HEALTHCARE | Age: 73
End: 2023-12-14
Payer: COMMERCIAL

## 2023-12-14 ENCOUNTER — OFFICE VISIT (OUTPATIENT)
Dept: PODIATRY | Facility: CLINIC | Age: 73
End: 2023-12-14

## 2023-12-14 VITALS
HEART RATE: 103 BPM | HEIGHT: 76 IN | SYSTOLIC BLOOD PRESSURE: 142 MMHG | DIASTOLIC BLOOD PRESSURE: 84 MMHG | BODY MASS INDEX: 28 KG/M2

## 2023-12-14 DIAGNOSIS — Z09 POSTOP CHECK: Primary | ICD-10-CM

## 2023-12-14 PROCEDURE — 99024 POSTOP FOLLOW-UP VISIT: CPT | Performed by: PODIATRIST

## 2023-12-14 NOTE — PROGRESS NOTES
This patient was seen on 12/14/23. Chief Complaint:  Jeronimo Joe is here for a post-op visit. Subjective:      Patient ID: Jeronimo Joe is a 72 y.o. male. Daryl Stewart is here for a post-op check after toe amputation. He had begun walking barefoot at home and got infected from a piece of glass embedded in the foot. He presents now on knee scooter. He admits he does step on the foot AMA from time-to-time. The following portions of the patient's history were reviewed and updated as appropriate: allergies, current medications, past family history, past medical history, past social history, past surgical history and problem list.    Review of Systems   Constitutional:  Positive for activity change. Negative for appetite change, chills, diaphoresis, fatigue, fever and unexpected weight change. Objective:    Jeronimo Joe appears stable, non-toxic and alert. /84   Pulse 103   Ht 6' 4" (1.93 m)   BMI 28.00 kg/m²     Foot/Ankle Musculoskeletal Exam    General    Neurological: alert       Physical Exam  Vitals and nursing note reviewed. Constitutional:       General: He is not in acute distress. Appearance: Normal appearance. He is not ill-appearing, toxic-appearing or diaphoretic. Neurological:      Mental Status: He is alert. The incision is well-coapted without dehiscence, signs of infection, active drainage, necrosis. Calf is soft and non-tender. Neurovascular status is intact to the foot. Normal post-op edema and bruising is noted. Diagnoses and all orders for this visit:    Postop check         Problem List Items Addressed This Visit          Other    Postop check - Primary       Assessment:  Healing post-op site. Plan:  Sutures left intact. STRICT NWB encouraged as the best way to prevent wound dehiscence. DSD changes per [facility]. Follow-up two weeks.

## 2023-12-15 ENCOUNTER — TELEPHONE (OUTPATIENT)
Age: 73
End: 2023-12-15

## 2023-12-15 NOTE — TELEPHONE ENCOUNTER
Caller: Won Gaitan Jr    Doctor/Office: /Paz    #: [phone]    Escalation: Care patient does not have any care instructions for his home visiting nurses. He is unsure how to care for his foot until his next post op in the office. Please return call asap to advise. Thank you

## 2023-12-18 ENCOUNTER — HOME CARE VISIT (OUTPATIENT)
Dept: HOME HEALTH SERVICES | Facility: HOME HEALTHCARE | Age: 73
End: 2023-12-18
Payer: COMMERCIAL

## 2023-12-18 VITALS
DIASTOLIC BLOOD PRESSURE: 74 MMHG | SYSTOLIC BLOOD PRESSURE: 156 MMHG | OXYGEN SATURATION: 98 % | HEART RATE: 88 BPM | RESPIRATION RATE: 18 BRPM | TEMPERATURE: 98.6 F

## 2023-12-18 DIAGNOSIS — M86.179 OSTEOMYELITIS OF FOOT, ACUTE (HCC): Primary | ICD-10-CM

## 2023-12-18 PROCEDURE — G0299 HHS/HOSPICE OF RN EA 15 MIN: HCPCS

## 2023-12-22 ENCOUNTER — HOME CARE VISIT (OUTPATIENT)
Dept: HOME HEALTH SERVICES | Facility: HOME HEALTHCARE | Age: 73
End: 2023-12-22
Payer: COMMERCIAL

## 2023-12-22 PROCEDURE — G0299 HHS/HOSPICE OF RN EA 15 MIN: HCPCS

## 2023-12-23 VITALS
HEART RATE: 82 BPM | DIASTOLIC BLOOD PRESSURE: 60 MMHG | TEMPERATURE: 98.1 F | OXYGEN SATURATION: 98 % | RESPIRATION RATE: 16 BRPM | SYSTOLIC BLOOD PRESSURE: 132 MMHG

## 2023-12-26 ENCOUNTER — HOME CARE VISIT (OUTPATIENT)
Dept: HOME HEALTH SERVICES | Facility: HOME HEALTHCARE | Age: 73
End: 2023-12-26
Payer: COMMERCIAL

## 2023-12-26 VITALS
TEMPERATURE: 98.2 F | RESPIRATION RATE: 18 BRPM | SYSTOLIC BLOOD PRESSURE: 150 MMHG | OXYGEN SATURATION: 98 % | DIASTOLIC BLOOD PRESSURE: 78 MMHG | HEART RATE: 94 BPM

## 2023-12-26 PROCEDURE — G0299 HHS/HOSPICE OF RN EA 15 MIN: HCPCS

## 2023-12-29 ENCOUNTER — HOME CARE VISIT (OUTPATIENT)
Dept: HOME HEALTH SERVICES | Facility: HOME HEALTHCARE | Age: 73
End: 2023-12-29
Payer: COMMERCIAL

## 2023-12-29 VITALS
RESPIRATION RATE: 18 BRPM | OXYGEN SATURATION: 98 % | TEMPERATURE: 98 F | SYSTOLIC BLOOD PRESSURE: 120 MMHG | HEART RATE: 66 BPM | DIASTOLIC BLOOD PRESSURE: 76 MMHG

## 2023-12-29 PROCEDURE — G0299 HHS/HOSPICE OF RN EA 15 MIN: HCPCS

## 2024-01-03 ENCOUNTER — OFFICE VISIT (OUTPATIENT)
Dept: PODIATRY | Facility: CLINIC | Age: 74
End: 2024-01-03

## 2024-01-03 VITALS
HEIGHT: 76 IN | SYSTOLIC BLOOD PRESSURE: 127 MMHG | BODY MASS INDEX: 28 KG/M2 | HEART RATE: 87 BPM | DIASTOLIC BLOOD PRESSURE: 66 MMHG

## 2024-01-03 DIAGNOSIS — Z09 POSTOP CHECK: Primary | ICD-10-CM

## 2024-01-03 PROCEDURE — 99024 POSTOP FOLLOW-UP VISIT: CPT | Performed by: PODIATRIST

## 2024-01-04 ENCOUNTER — TELEPHONE (OUTPATIENT)
Age: 74
End: 2024-01-04

## 2024-01-04 ENCOUNTER — HOME CARE VISIT (OUTPATIENT)
Dept: HOME HEALTH SERVICES | Facility: HOME HEALTHCARE | Age: 74
End: 2024-01-04
Payer: COMMERCIAL

## 2024-01-04 DIAGNOSIS — L03.116 CELLULITIS OF LEFT FOOT: Primary | ICD-10-CM

## 2024-01-04 DIAGNOSIS — E11.40 TYPE 2 DIABETES MELLITUS WITH DIABETIC NEUROPATHY, WITHOUT LONG-TERM CURRENT USE OF INSULIN (HCC): Chronic | ICD-10-CM

## 2024-01-04 PROCEDURE — G0299 HHS/HOSPICE OF RN EA 15 MIN: HCPCS

## 2024-01-04 PROCEDURE — 400014 VN F/U

## 2024-01-04 RX ORDER — CEPHALEXIN 500 MG/1
500 CAPSULE ORAL EVERY 6 HOURS SCHEDULED
Qty: 28 CAPSULE | Refills: 0 | Status: SHIPPED | OUTPATIENT
Start: 2024-01-04 | End: 2024-01-11

## 2024-01-04 RX ORDER — INSULIN LISPRO 100 [IU]/ML
6 INJECTION, SOLUTION INTRAVENOUS; SUBCUTANEOUS
Qty: 6 ML | Refills: 3 | Status: SHIPPED | OUTPATIENT
Start: 2024-01-04 | End: 2024-05-16

## 2024-01-04 NOTE — PROGRESS NOTES
"This patient was seen on 1/3/24.    My role is Foot , Ankle, and Wound Specialist    SUBJECTIVE    Chief Complaint:  Post-Op     Patient ID: Won Gaitan Jr. is a 73 y.o. male.    Won is here for a post-op check after toe amputation.         The following portions of the patient's history were reviewed and updated as appropriate: allergies, current medications, past family history, past medical history, past social history, past surgical history and problem list.    Review of Systems   Constitutional:  Positive for activity change. Negative for appetite change, chills, diaphoresis, fatigue, fever and unexpected weight change.         OBJECTIVE      /66   Pulse 87   Ht 6' 4\" (1.93 m)   BMI 28.00 kg/m²     Foot/Ankle Musculoskeletal Exam    General    Neurological: alert       Physical Exam  Vitals and nursing note reviewed.   Constitutional:       General: He is not in acute distress.     Appearance: Normal appearance. He is not ill-appearing, toxic-appearing or diaphoretic.   Neurological:      Mental Status: He is alert.         The amputation incision is healed. The contralateral TMA / free flap are stable without wounds.     ASSESSMENT     Diagnoses and all orders for this visit:    Postop check         Problem List Items Addressed This Visit          Other    Postop check - Primary           PLAN    Sutures removed. I recommend continue wedge shoe for two more weeks. He may shower foot. He may resume DM shoegear in two weeks. Foot precautions reviewed with patient including the need to wear protective shoegear at all times when walking including in the home, the need to check feet all surfaces daily with a mirror and report and skin breaks to podiatry, the need to apply an emollient to skin of feet daily.   "

## 2024-01-04 NOTE — TELEPHONE ENCOUNTER
I spoke with the [facility] informed her the doc is OOO the rest of the day but that the message was forwarded to him for review.

## 2024-01-04 NOTE — TELEPHONE ENCOUNTER
Caller: Won Gaitan Jr    Doctor/Office: Dr. Zamudio/Paz    #: [phone]    Escalation: Surgery Patient thinks he has an infection in his left foot. Patient states foot his red, inflamed and feels like he should be seen right away. Please return call and advise. Thank you

## 2024-01-04 NOTE — TELEPHONE ENCOUNTER
Please see below messages. Patient calling again to see if Dr. Zamudio got the pictures his home nurse sent? He had no fever but the nurse thinks he may need antibiotics? Please return call. Thank you

## 2024-01-04 NOTE — TELEPHONE ENCOUNTER
I spoke with pt and informed him per  to take his tempeture to rule out if he is running a fever or not, he is to also take a picture of the foot and send it to us through [employer] for  to review. He verbalized understanding.

## 2024-01-04 NOTE — TELEPHONE ENCOUNTER
Caller: [facility]  For patient Won Gaitan     Doctor: Robb    Reason for call: Area where stitches were removed is hot and red.  What should she do?  Won does NOT have a fever.      Call back#: [phone]

## 2024-01-05 VITALS
TEMPERATURE: 97.5 F | SYSTOLIC BLOOD PRESSURE: 128 MMHG | RESPIRATION RATE: 16 BRPM | OXYGEN SATURATION: 97 % | HEART RATE: 79 BPM | DIASTOLIC BLOOD PRESSURE: 70 MMHG

## 2024-01-08 ENCOUNTER — HOME CARE VISIT (OUTPATIENT)
Dept: HOME HEALTH SERVICES | Facility: HOME HEALTHCARE | Age: 74
End: 2024-01-08
Payer: COMMERCIAL

## 2024-01-08 VITALS
DIASTOLIC BLOOD PRESSURE: 58 MMHG | RESPIRATION RATE: 18 BRPM | TEMPERATURE: 98.2 F | HEART RATE: 84 BPM | SYSTOLIC BLOOD PRESSURE: 136 MMHG | OXYGEN SATURATION: 97 %

## 2024-01-08 PROCEDURE — G0299 HHS/HOSPICE OF RN EA 15 MIN: HCPCS

## 2024-01-10 ENCOUNTER — OFFICE VISIT (OUTPATIENT)
Dept: FAMILY MEDICINE CLINIC | Facility: CLINIC | Age: 74
End: 2024-01-10
Payer: COMMERCIAL

## 2024-01-10 VITALS
HEIGHT: 76 IN | SYSTOLIC BLOOD PRESSURE: 142 MMHG | HEART RATE: 82 BPM | DIASTOLIC BLOOD PRESSURE: 76 MMHG | TEMPERATURE: 97 F | WEIGHT: 234.2 LBS | OXYGEN SATURATION: 97 % | BODY MASS INDEX: 28.52 KG/M2

## 2024-01-10 DIAGNOSIS — N40.1 BENIGN PROSTATIC HYPERPLASIA WITH WEAK URINARY STREAM: Chronic | ICD-10-CM

## 2024-01-10 DIAGNOSIS — B20 HUMAN IMMUNODEFICIENCY VIRUS (HIV) DISEASE (HCC): ICD-10-CM

## 2024-01-10 DIAGNOSIS — E11.622 TYPE 2 DIABETES MELLITUS WITH OTHER SKIN ULCER (CODE) (HCC): ICD-10-CM

## 2024-01-10 DIAGNOSIS — I49.01 VENTRICULAR FIBRILLATION (HCC): ICD-10-CM

## 2024-01-10 DIAGNOSIS — E11.40 TYPE 2 DIABETES MELLITUS WITH DIABETIC NEUROPATHY, WITHOUT LONG-TERM CURRENT USE OF INSULIN (HCC): Primary | ICD-10-CM

## 2024-01-10 DIAGNOSIS — E78.5 HYPERLIPIDEMIA, UNSPECIFIED HYPERLIPIDEMIA TYPE: Chronic | ICD-10-CM

## 2024-01-10 DIAGNOSIS — Z89.431 ACQUIRED ABSENCE OF RIGHT FOOT (HCC): ICD-10-CM

## 2024-01-10 DIAGNOSIS — I48.91 ATRIAL FIBRILLATION, UNSPECIFIED TYPE (HCC): ICD-10-CM

## 2024-01-10 DIAGNOSIS — I10 ESSENTIAL HYPERTENSION: Chronic | ICD-10-CM

## 2024-01-10 DIAGNOSIS — Z89.421 ACQUIRED ABSENCE OF OTHER RIGHT TOE(S) (HCC): ICD-10-CM

## 2024-01-10 DIAGNOSIS — R26.2 AMBULATORY DYSFUNCTION: ICD-10-CM

## 2024-01-10 DIAGNOSIS — M86.172 OTHER ACUTE OSTEOMYELITIS, LEFT ANKLE AND FOOT (HCC): ICD-10-CM

## 2024-01-10 DIAGNOSIS — L97.526 NON-PRESSURE CHRONIC ULCER OF OTHER PART OF LEFT FOOT WITH BONE INVOLVEMENT WITHOUT EVIDENCE OF NECROSIS (HCC): ICD-10-CM

## 2024-01-10 DIAGNOSIS — U07.1 COVID-19: ICD-10-CM

## 2024-01-10 DIAGNOSIS — R80.9 MICROALBUMINURIA: ICD-10-CM

## 2024-01-10 DIAGNOSIS — Z23 ENCOUNTER FOR IMMUNIZATION: ICD-10-CM

## 2024-01-10 DIAGNOSIS — I48.91 ATRIAL FIBRILLATION WITH RVR (HCC): ICD-10-CM

## 2024-01-10 DIAGNOSIS — R39.12 BENIGN PROSTATIC HYPERPLASIA WITH WEAK URINARY STREAM: Chronic | ICD-10-CM

## 2024-01-10 DIAGNOSIS — L03.116 CELLULITIS OF LEFT LOWER LEG: ICD-10-CM

## 2024-01-10 PROBLEM — Z09 POSTOP CHECK: Status: RESOLVED | Noted: 2021-03-10 | Resolved: 2024-01-10

## 2024-01-10 PROBLEM — L03.119 CELLULITIS OF EXTREMITY: Status: RESOLVED | Noted: 2021-02-16 | Resolved: 2024-01-10

## 2024-01-10 LAB — SL AMB POCT HEMOGLOBIN AIC: 9.5 (ref ?–6.5)

## 2024-01-10 PROCEDURE — 90662 IIV NO PRSV INCREASED AG IM: CPT | Performed by: FAMILY MEDICINE

## 2024-01-10 PROCEDURE — 83036 HEMOGLOBIN GLYCOSYLATED A1C: CPT | Performed by: FAMILY MEDICINE

## 2024-01-10 PROCEDURE — 90471 IMMUNIZATION ADMIN: CPT | Performed by: FAMILY MEDICINE

## 2024-01-10 PROCEDURE — 99214 OFFICE O/P EST MOD 30 MIN: CPT | Performed by: FAMILY MEDICINE

## 2024-01-10 RX ORDER — DILTIAZEM HYDROCHLORIDE 120 MG/1
120 TABLET, FILM COATED ORAL 2 TIMES DAILY
Qty: 180 TABLET | Refills: 0 | Status: SHIPPED | OUTPATIENT
Start: 2024-01-10 | End: 2024-04-09

## 2024-01-10 RX ORDER — APIXABAN 5 MG/1
5 TABLET, FILM COATED ORAL 2 TIMES DAILY
Qty: 60 TABLET | Refills: 11 | Status: SHIPPED | OUTPATIENT
Start: 2024-01-10

## 2024-01-10 RX ORDER — INSULIN GLARGINE 100 [IU]/ML
40 INJECTION, SOLUTION SUBCUTANEOUS
Qty: 12 ML | Refills: 0 | Status: SHIPPED | OUTPATIENT
Start: 2024-01-10 | End: 2024-02-09

## 2024-01-10 RX ORDER — TAMSULOSIN HYDROCHLORIDE 0.4 MG/1
0.4 CAPSULE ORAL
Qty: 90 CAPSULE | Refills: 2 | Status: SHIPPED | OUTPATIENT
Start: 2024-01-10

## 2024-01-10 RX ORDER — ATORVASTATIN CALCIUM 40 MG/1
40 TABLET, FILM COATED ORAL DAILY
Qty: 90 TABLET | Refills: 3 | Status: SHIPPED | OUTPATIENT
Start: 2024-01-10

## 2024-01-10 RX ORDER — PEN NEEDLE, DIABETIC 32GX 5/32"
NEEDLE, DISPOSABLE MISCELLANEOUS 2 TIMES DAILY
Qty: 100 EACH | Refills: 6 | Status: SHIPPED | OUTPATIENT
Start: 2024-01-10

## 2024-01-10 RX ORDER — METOPROLOL SUCCINATE 100 MG/1
100 TABLET, EXTENDED RELEASE ORAL 2 TIMES DAILY
Qty: 180 TABLET | Refills: 0 | Status: SHIPPED | OUTPATIENT
Start: 2024-01-10 | End: 2024-04-09

## 2024-01-10 RX ORDER — BLOOD SUGAR DIAGNOSTIC
1 STRIP MISCELLANEOUS 2 TIMES DAILY
Qty: 100 STRIP | Refills: 22 | Status: SHIPPED | OUTPATIENT
Start: 2024-01-10

## 2024-01-10 RX ORDER — LISINOPRIL 40 MG/1
40 TABLET ORAL DAILY
Qty: 90 TABLET | Refills: 3 | Status: SHIPPED | OUTPATIENT
Start: 2024-01-10

## 2024-01-10 RX ORDER — PEN NEEDLE, DIABETIC 32GX 5/32"
NEEDLE, DISPOSABLE MISCELLANEOUS
COMMUNITY
Start: 2023-11-06 | End: 2024-01-10 | Stop reason: SDUPTHER

## 2024-01-10 RX ORDER — INSULIN LISPRO 100 [IU]/ML
6 INJECTION, SOLUTION INTRAVENOUS; SUBCUTANEOUS
Qty: 6 ML | Refills: 3 | Status: SHIPPED | OUTPATIENT
Start: 2024-01-10 | End: 2024-05-22

## 2024-01-10 NOTE — PATIENT INSTRUCTIONS
I do recommend getting the COVID booster when you get an opportunity.  This is still available at your local pharmacy.    I am happy to involve a an endocrine specialist for diabetic control whenever you are up for that.    Target blood sugars are average around 150 in the fasting state and around 180 to 202 hours after the meals.

## 2024-01-10 NOTE — PROGRESS NOTES
"Assessment/Plan:    No problem-specific Assessment & Plan notes found for this encounter.             Subjective:      Patient ID: Won Gaitan Jr. is a 73 y.o. male.      PATIENT RETURNS FOR FOLLOW-UP OF CHRONIC MEDICAL CONDITIONS.  ANY HOSPITAL VISITS, EMERGENCY VISITS AND OTHER PROVIDER VISITS SINCE LAST TIME WERE REVIEWED.  MEDS WERE REVIEWED AND NO SIDE EFFECTS.  NO NEW ISSUES  UNLESS NOTED BELOW. NO NEW MEDICAL PROVIDER REPORTED. THE CHRONIC DISEASES LISTED ABOVE ARE STABLE AND UNCHANGED/ THE PLAN OF CARE FOR THOSE WILL REMAIN UNCHANGED UNLESS NOTED BELOW.      S/p L small toe amputation recently. (Late '23); on antibx for post op infection.         The following portions of the patient's history were reviewed and updated as appropriate: allergies, current medications, past family history, past medical history, past social history, past surgical history and problem list.    Review of Systems   Constitutional:  Negative for activity change and appetite change.   HENT:  Negative for trouble swallowing.    Eyes:  Negative for visual disturbance.   Respiratory:  Negative for cough and shortness of breath.    Cardiovascular:  Negative for chest pain, palpitations and leg swelling.   Gastrointestinal:  Negative for abdominal pain and blood in stool.   Endocrine: Negative for polyuria.   Genitourinary:  Negative for difficulty urinating and hematuria.   Skin:  Negative for rash.   Neurological:  Negative for dizziness.   Psychiatric/Behavioral:  Negative for behavioral problems.          Objective:  Vitals:    01/10/24 1031   BP: 142/76   BP Location: Left arm   Patient Position: Sitting   Cuff Size: Large   Pulse: 82   Temp: (!) 97 °F (36.1 °C)   SpO2: 97%   Weight: 106 kg (234 lb 3.2 oz)   Height: 6' 4\" (1.93 m)      Physical Exam  Constitutional:       Appearance: Normal appearance.   HENT:      Head: Normocephalic and atraumatic.      Right Ear: Tympanic membrane and ear canal normal.      Left Ear: Tympanic " membrane and ear canal normal.      Mouth/Throat:      Mouth: Mucous membranes are moist.      Pharynx: Oropharynx is clear.   Eyes:      Conjunctiva/sclera: Conjunctivae normal.   Cardiovascular:      Rate and Rhythm: Normal rate and regular rhythm.      Pulses: Normal pulses.      Heart sounds: Normal heart sounds. No murmur heard.  Pulmonary:      Effort: Pulmonary effort is normal. No respiratory distress.      Breath sounds: Normal breath sounds.   Musculoskeletal:      Cervical back: Neck supple.   Lymphadenopathy:      Cervical: No cervical adenopathy.   Neurological:      Mental Status: He is alert. Mental status is at baseline.   Psychiatric:         Mood and Affect: Mood normal.         Thought Content: Thought content normal.           Patient's chronic problems that were reviewed today are stable. Recent hospital stays reviewed. Recent labs and imaging reviewed. Recent visits to other providers reviewed. Meds reviewed and no changes made. Appropriate labs and imaging were ordered. Preventive measures appropriate for age and sex were reviewed with patient. Immunizations were updated as appropriate.       Again offerred referral to endo ; he does not wish

## 2024-01-12 ENCOUNTER — HOME CARE VISIT (OUTPATIENT)
Dept: HOME HEALTH SERVICES | Facility: HOME HEALTHCARE | Age: 74
End: 2024-01-12
Payer: COMMERCIAL

## 2024-01-15 ENCOUNTER — HOME CARE VISIT (OUTPATIENT)
Dept: HOME HEALTH SERVICES | Facility: HOME HEALTHCARE | Age: 74
End: 2024-01-15
Payer: COMMERCIAL

## 2024-01-15 VITALS
DIASTOLIC BLOOD PRESSURE: 76 MMHG | SYSTOLIC BLOOD PRESSURE: 134 MMHG | OXYGEN SATURATION: 98 % | RESPIRATION RATE: 20 BRPM | TEMPERATURE: 97.9 F | HEART RATE: 61 BPM

## 2024-01-15 PROCEDURE — G0299 HHS/HOSPICE OF RN EA 15 MIN: HCPCS

## 2024-01-29 ENCOUNTER — TELEPHONE (OUTPATIENT)
Age: 74
End: 2024-01-29

## 2024-01-29 NOTE — TELEPHONE ENCOUNTER
Caller: Won     Doctor/Office: Kris/Guerita ZARATE#: [phone]    Escalation: Appointment left foot where sx was it is swollen.  Would like to be seen as son as possible

## 2024-01-29 NOTE — TELEPHONE ENCOUNTER
Spoke to PT  They are concern he might an infection  SX 12/2023  Was able to find an appt on Wed 1/31/2024

## 2024-02-07 ENCOUNTER — HOSPITAL ENCOUNTER (EMERGENCY)
Facility: HOSPITAL | Age: 74
Discharge: HOME | End: 2024-02-07
Attending: EMERGENCY MEDICINE | Admitting: EMERGENCY MEDICINE
Payer: COMMERCIAL

## 2024-02-07 VITALS
DIASTOLIC BLOOD PRESSURE: 93 MMHG | OXYGEN SATURATION: 100 % | RESPIRATION RATE: 18 BRPM | HEART RATE: 81 BPM | TEMPERATURE: 97.8 F | SYSTOLIC BLOOD PRESSURE: 178 MMHG

## 2024-02-07 DIAGNOSIS — T16.2XXA FOREIGN BODY IN LEFT EAR: Primary | ICD-10-CM

## 2024-02-07 PROCEDURE — 69200 CLEAR OUTER EAR CANAL: CPT | Performed by: EMERGENCY MEDICINE

## 2024-02-07 PROCEDURE — 99282 EMERGENCY DEPT VISIT SF MDM: CPT

## 2024-02-07 PROCEDURE — 99283 EMERGENCY DEPT VISIT LOW MDM: CPT | Performed by: EMERGENCY MEDICINE

## 2024-02-07 NOTE — ED PROVIDER NOTES
History  Chief Complaint   Patient presents with    Foreign Body in Ear     Hearing aide      Won is a 73 y.o. male who presents with the chief complaint of a foreign body in his left ear.  He reports he was wearing his old hearing aids and when he took it out the rubber fitting piece remained in his canal.  He attempted to remove the piece with forceps at home but only pushed it further.        History provided by:  Patient  Foreign Body in Ear  Location:  L ear  Suspected object: piece of hearing aid.  Pain severity:  No pain      Prior to Admission Medications   Prescriptions Last Dose Informant Patient Reported? Taking?   Accu-Chek Guide test strip   No No   Sig: Use 1 each 2 (two) times a day Use as instructed   Easy Comfort Pen Needles 33G X 5 MM MISC   No No   Sig: Inject under the skin 2 (two) times a day   Eliquis 5 MG   No No   Sig: Take 1 tablet (5 mg total) by mouth 2 (two) times a day   Insulin Glargine Solostar (Lantus SoloStar) 100 UNIT/ML SOPN   No No   Sig: Inject 0.4 mL (40 Units total) under the skin daily at bedtime   Lancets (onetouch ultrasoft) lancets  Self No No   Sig: Use as instructed   Lancets (onetouch ultrasoft) lancets   No No   Sig: Use daily   acetaminophen (Mapap Arthritis Pain) 650 mg CR tablet  Self No No   Sig: Take 1 tablet (650 mg total) by mouth 2 (two) times a day   Patient not taking: Reported on 1/10/2024   atorvastatin (LIPITOR) 40 mg tablet   No No   Sig: Take 1 tablet (40 mg total) by mouth daily   diltiazem (CARDIZEM) 120 MG tablet   No No   Sig: Take 1 tablet (120 mg total) by mouth 2 (two) times a day   glucose blood (OneTouch Ultra) test strip  Self No No   Sig: Use as instructed   glucose blood (OneTouch Verio) test strip  Self No No   Sig: Use 1 each daily Use as instructed   insulin lispro (HumaLOG) 100 units/mL injection pen   No No   Sig: Inject 6 Units under the skin 3 (three) times a day with meals   lisinopril (ZESTRIL) 40 mg tablet   No No   Sig: Take 1  "tablet (40 mg total) by mouth daily   metFORMIN (GLUCOPHAGE) 500 mg tablet   No No   Sig: Take 1 tablet (500 mg total) by mouth 2 (two) times a day with meals   metoprolol succinate (TOPROL-XL) 100 mg 24 hr tablet   No No   Sig: Take 1 tablet (100 mg total) by mouth 2 (two) times a day   tamsulosin (FLOMAX) 0.4 mg   No No   Sig: Take 1 capsule (0.4 mg total) by mouth daily with dinner      Facility-Administered Medications: None       Past Medical History:   Diagnosis Date    Arthritis     Atrial fibrillation (HCC)     Diagnosed 11/2018    Benign prostate hyperplasia 04/2002    Cellulitis     right lower leg     Colon polyp 2006    Diabetes mellitus (HCC)     Hearing aid worn     bilat    Hearing loss     90% loss left ear and 40% right ear    History of clubfoot     \"since birth\"    History of pneumonia     History of TIA (transient ischemic attack) 04/25/2017 11/30/18 pt denies    Hyperlipidemia 5/15/2014    Hypertension     Infectious viral hepatitis     \"cant remember type\"    Irregular heart beat     Afib    Neuropathy     both feet    Osteomyelitis (HCC)     right great toe    Pneumonia     Right middle lobe pulmonary nodule 11/6/2018    Seasickness     Teeth missing     Type 2 diabetes mellitus with diabetic neuropathy (HCC) 11/6/2018    Wears glasses     reading    Wound, open     bottom of right foot       Past Surgical History:   Procedure Laterality Date    BUNIONECTOMY Right 12/4/2018    Procedure: 5TH METATARSAL BONE PARTIAL RESECTION, FULL THICKNESS DEBRIDEMENT OF DIABETIC ULCER;  Surgeon: Shala Flynn DPM;  [facility];  Service: Podiatry    CLOSURE DELAYED PRIMARY Left 10/30/2023    Procedure: CLOSURE DELAYED PRIMARY left foot;  Surgeon: Minh Urena DPM;  [facility];  Service: Podiatry    CLUB FOOT RELEASE Bilateral     COLONOSCOPY      COMPLEX WOUND CLOSURE TO EXTREMITY  4/27/2021    Procedure: COMPLEX WOUND CLOSURE TO EXTREMITY: RIGHT FOOT;  Surgeon: Niraj Mccormick " MD Maryellen;  [facility];  Service: Plastics    EXTERNAL FIXATOR APPLICATION Right 2/12/2021    Procedure: Application multiplane external fixation;  Surgeon: Harry Zamudio DPM;  [facility];  Service: Podiatry    FOOT HARDWARE REMOVAL Right 2/17/2021    Procedure: REMOVAL EXTERNAL FIXATOR;  Surgeon: Harry Zamudio DPM;  [facility];  Service: Podiatry    FOREIGN BODY REMOVAL Right 4/27/2021    Procedure: REMOVAL FOREIGN BODY EXTREMITY: RIGHT FOOT;  Surgeon: Niraj Bojorquez MD;  [facility];  Service: Plastics    INCISION AND DRAINAGE OF WOUND Right 2/17/2021    Procedure: INCISION AND DRAINAGE (I&D) EXTREMITY;  Surgeon: Harry Zamudio DPM;  [facility];  Service: Podiatry    INCISION AND DRAINAGE OF WOUND Left 10/26/2023    Procedure: INCISION AND DRAINAGE (I&D) EXTREMITY; WASHOUT; REMOVAL OF FOREIGN BODY;  Surgeon: Satnam Ponce DPM;  [facility];  Service: Podiatry    ORIF FOOT FRACTURE Right 3/4/2021    Procedure: VAC PLACEMENT; SCREW FIXATION RIGHT FOOT FUSION;  Surgeon: Harry Zamudio DPM;  [facility];  Service: Podiatry    KS AMPUTATION FOOT TRANSMETARSAL Right 2/22/2021    Procedure: AMPUTATION TRANSMETATARSAL (TMA), REMOVAL NAIL IM T2 ICF HARDWARE;  Surgeon: Harry Zamudio DPM;  [facility];  Service: Podiatry    KS AMPUTATION METATARSAL W/TOE SINGLE Left 11/29/2023    Procedure: LEFT FOURTH RAY RESECTION FOOT;  Surgeon: Harry Zamudio DPM;  [facility];  Service: Podiatry    KS ARTHRD MIDTARSL/TARS MLT/TRANSVRS W/OSTEOT Right 2/12/2021    Procedure: foot ARTHRODESIS/FUSION;  Surgeon: Harry Zamudio DPM;  [facility];  Service: Podiatry    KS DEBRIDEMENT BONE 1ST 20 SQ CM/< Right 12/2/2021    Procedure: DEBRIDEMENT OF TARSAL BONES WITH ANITBIODIC BEADS;  Surgeon: Harry Zamudio DPM;  [facility];  Service: Podiatry    KS LNGTH/SHRT TENDON LEG/ANKLE 1 TENDON SPX Right 2/12/2021     Procedure: LENGTHEN TIBIAL TENDON, TRANS HEEL CORD;  Surgeon: Harry Zamudio DPM;  [facility];  Service: Podiatry    OH MUSC MYOCUTANEOUS/FASCIOCUTANEOUS FLAP TRUNK Right 4/12/2021    Procedure: RECONSTRUCTION MICROSURGICAL W/ FREE FLAP;  Surgeon: Niraj Bojorquez MD;  [facility];  Service: Plastics    OH MUSC MYOCUTANEOUS/FASCIOCUTANEOUS FLAP TRUNK Right 4/12/2021    Procedure: TAKEBACK RECONSTRUCTION MICROSURGICAL W/ FREE FLAP;  Surgeon: Niraj Bojorquez MD;  [facility];  Service: Plastics    OH MUSC MYOCUTANEOUS/FASCIOCUTANEOUS FLAP TRUNK Right 4/13/2021    Procedure: RECONSTRUCTION MICROSURGICAL W/ FREE FLAP, RE EXPLORATION, MICRO VASCULAR REVISION;  Surgeon: Niraj Bojorquez MD;  [facility];  Service: Plastics    OH OSTC PRTL EXOSTC/CONDYLC METAR HEAD Right 12/29/2020    Procedure: EXCISION EXOSTOSIS;  Surgeon: Harry Zamudio DPM;  [facility];  Service: Podiatry    OH SECONDARY CLOSURE SURG WOUND/DEHSN XTNSV/COMP Right 12/29/2020    Procedure: PRIMARY DELAYED CLOSURE;  Surgeon: Harry Zamudio DPM;  [facility];  Service: Podiatry    TOE AMPUTATION Right     partial great toe    TONSILLECTOMY      VAC DRESSING APPLICATION Right 3/1/2021    Procedure: APPLICATION VAC DRESSING EXTREMITY;  Surgeon: Niraj Bojoruqez MD;  [facility];  Service: Plastics    VASECTOMY  1992    WOUND DEBRIDEMENT Right 3/1/2021    Procedure: DEBRIDEMENT LOWER EXTREMITY (WASH OUT);  Surgeon: Niraj Bojorquez MD;  [facility];  Service: Plastics    WOUND DEBRIDEMENT Right 4/7/2021    Procedure: DEBRIDEMENT RIGHT FOOT;  Surgeon: Niraj Bojorquez MD;  [facility];  Service: Plastics    WOUND DEBRIDEMENT Right 4/12/2021    Procedure: DEBRIDEMENT LOWER EXTREMITY (WASH OUT);  Surgeon: Niraj Bojorquez MD;  [facility];  Service: Plastics    WOUND DEBRIDEMENT Right 4/27/2021    Procedure:  DEBRIDEMENT LOWER EXTREMITY (WASH OUT): RIGHT FOOT;  Surgeon: Niraj Bojorquez MD;  [facility];  Service: Plastics       Family History   Problem Relation Age of Onset    Diabetes Father         mellitus     I have reviewed and agree with the history as documented.    E-Cigarette/Vaping    E-Cigarette Use Never User      E-Cigarette/Vaping Substances    Nicotine No     THC No     CBD No     Flavoring No     Other No     Unknown No      Social History     Tobacco Use    Smoking status: Former     Current packs/day: 0.00     Types: Cigarettes     Quit date: 1988     Years since quittin.2    Smokeless tobacco: Former    Tobacco comments:     exposure to passive smoke   Vaping Use    Vaping status: Never Used   Substance Use Topics    Alcohol use: Yes     Comment: occasionally    Drug use: Not Currently       Review of Systems    Physical Exam  Physical Exam  Constitutional:       General: He is in acute distress (mild).   HENT:      Left Ear: A foreign body is present.         Vital Signs  ED Triage Vitals [24 1007]   Temperature Pulse Respirations Blood Pressure SpO2   97.8 °F (36.6 °C) 81 18 (!) 216/112 100 %      Temp Source Heart Rate Source Patient Position - Orthostatic VS BP Location FiO2 (%)   Oral Monitor Sitting Right arm --      Pain Score       --           Vitals:    24 1007 24 1025   BP: (!) 216/112 (!) 178/93   Pulse: 81    Patient Position - Orthostatic VS: Sitting          Visual Acuity      ED Medications  Medications - No data to display    Diagnostic Studies  Results Reviewed       None                   No orders to display              Procedures  Foreign Body - Orifice    Date/Time: 2024 10:10 AM    Performed by: Won Haq MD  Authorized by: Won Haq MD    Patient location:  ED  Other Assisting Provider: No    Consent:     Consent obtained:  Verbal    Consent given by:  Patient    Risks discussed:  Incomplete removal and  pain    Alternatives discussed:  Delayed treatment  Universal protocol:     Patient identity confirmed:  Verbally with patient  Location:     Location:  Ear    Ear location:  L ear  Pre-procedure details:     Imaging:  None  Anesthesia (see MAR for exact dosages):     Topical anesthetic:  None  Procedure details:     Localization method:  Direct visualization (with operating otoscope)    Removal mechanism:  Alligator forceps    Foreign bodies recovered:  1    Description:  Rubber ear piece for hearing aid  Post-procedure details:     Confirmation:  No additional foreign bodies on visualization    Patient tolerance of procedure:  Tolerated well, no immediate complications           ED Course                                             Medical Decision Making  Won is a 73 y.o. male who presents with the chief complaint of left ear foreign body - will remove with operating otoscope.   No TM perforation, no significant irritation or inflammation.              Disposition  Final diagnoses:   Foreign body in left ear     Time reflects when diagnosis was documented in both MDM as applicable and the Disposition within this note       Time User Action Codes Description Comment    2/7/2024 10:15 AM Won Haq Add [T16.2XXA] Foreign body in left ear           ED Disposition       ED Disposition   Discharge    Condition   Stable    Date/Time   Wed Feb 7, 2024 10:15 AM    Comment   Won Gaitan Jr. discharge to home/self care.                   Follow-up Information       Follow up With Specialties Details Why Contact Info    Samuel Ornelas MD Family Medicine  As needed [address] [facility] [id number] [phone]              Patient's Medications   Discharge Prescriptions    No medications on file       No discharge procedures on file.    PDMP Review       None            ED Provider  Electronically Signed by             Won Haq MD  02/07/24 1026       Won Haq MD  02/07/24 1029

## 2024-03-02 ENCOUNTER — RA CDI HCC (OUTPATIENT)
Dept: OTHER | Facility: HOSPITAL | Age: 74
End: 2024-03-02

## 2024-03-02 PROBLEM — Z86.16 PERSONAL HISTORY OF COVID-19: Status: ACTIVE | Noted: 2021-10-18

## 2024-03-02 PROBLEM — Z86.16 PERSONAL HISTORY OF COVID-19: Status: ACTIVE | Noted: 2024-03-02

## 2024-03-04 ENCOUNTER — TELEPHONE (OUTPATIENT)
Age: 74
End: 2024-03-04

## 2024-03-04 NOTE — TELEPHONE ENCOUNTER
Caller: Won Gaitan Jr    Doctor/Office: Dr. Ponce/[facility]#: [phone]    Escalation: Care/Special shoe Dr. Ponce wrote RX for is now split open on the bottom, so needs a script saying repair shoe sent to  in [place]/behind [facility] on [address]. Please call Won back after this has been done so he can schedule to get it repaired. Thanks

## 2024-03-05 ENCOUNTER — TELEPHONE (OUTPATIENT)
Age: 74
End: 2024-03-05

## 2024-03-05 DIAGNOSIS — Z89.431 S/P TRANSMETATARSAL AMPUTATION OF FOOT, RIGHT (HCC): ICD-10-CM

## 2024-03-05 DIAGNOSIS — E11.40 TYPE 2 DIABETES MELLITUS WITH DIABETIC NEUROPATHY, WITHOUT LONG-TERM CURRENT USE OF INSULIN (HCC): Primary | ICD-10-CM

## 2024-03-05 NOTE — TELEPHONE ENCOUNTER
Caller: Won Gaitan    Doctor/Office: Dr. Ponce/[facility]#: [phone]    Escalation: Care/Sent message yesterday about getting an RX sent over to [facility] in [place] to repair his shoe. He has no shoe to wear until it is fixed. Won just called back asking when it will be sent. Please fax and call pt back and advise so he can get scheduled to get it repaired. Thanks

## 2024-03-08 ENCOUNTER — OFFICE VISIT (OUTPATIENT)
Dept: FAMILY MEDICINE CLINIC | Facility: CLINIC | Age: 74
End: 2024-03-08
Payer: COMMERCIAL

## 2024-03-08 VITALS
HEIGHT: 76 IN | WEIGHT: 241.2 LBS | TEMPERATURE: 97.6 F | HEART RATE: 106 BPM | DIASTOLIC BLOOD PRESSURE: 100 MMHG | BODY MASS INDEX: 29.37 KG/M2 | SYSTOLIC BLOOD PRESSURE: 180 MMHG | RESPIRATION RATE: 20 BRPM | OXYGEN SATURATION: 99 %

## 2024-03-08 DIAGNOSIS — J40 BRONCHITIS: Primary | ICD-10-CM

## 2024-03-08 DIAGNOSIS — I10 ESSENTIAL HYPERTENSION: Chronic | ICD-10-CM

## 2024-03-08 DIAGNOSIS — M86.179 OSTEOMYELITIS OF FOOT, ACUTE (HCC): ICD-10-CM

## 2024-03-08 DIAGNOSIS — E11.40 TYPE 2 DIABETES MELLITUS WITH DIABETIC NEUROPATHY, WITHOUT LONG-TERM CURRENT USE OF INSULIN (HCC): ICD-10-CM

## 2024-03-08 PROCEDURE — 99214 OFFICE O/P EST MOD 30 MIN: CPT | Performed by: FAMILY MEDICINE

## 2024-03-08 PROCEDURE — G2211 COMPLEX E/M VISIT ADD ON: HCPCS | Performed by: FAMILY MEDICINE

## 2024-03-08 RX ORDER — AZITHROMYCIN 250 MG/1
TABLET, FILM COATED ORAL
Qty: 6 TABLET | Refills: 0 | Status: SHIPPED | OUTPATIENT
Start: 2024-03-08 | End: 2024-03-12

## 2024-03-08 NOTE — ASSESSMENT & PLAN NOTE
Lab Results   Component Value Date    HGBA1C 9.5 (A) 01/10/2024     Reviewed meds, needs A1c in April 2024.  -reviewed diet

## 2024-03-08 NOTE — ASSESSMENT & PLAN NOTE
BP: 180/100, elevated. Will check amb BP for 2 weeks and may need to add new med, likely thiazide diuretic.

## 2024-03-08 NOTE — PROGRESS NOTES
Name: Won Gaitan Jr.      : 1950      MRN: [id number]  Encounter Provider: Pieter Torres DO  Encounter Date: 3/8/2024   Encounter department: [facility]    Assessment & Plan     1. Bronchitis  Assessment & Plan:  Ongoing congestion, going into chest.  zpak    Orders:  -     azithromycin (ZITHROMAX) 250 mg tablet; Take 2 tablets today then 1 tablet daily x 4 days    2. Osteomyelitis of foot, acute (HCC)  Assessment & Plan:  stable      3. Type 2 diabetes mellitus with diabetic neuropathy, without long-term current use of insulin (HCC)  Assessment & Plan:    Lab Results   Component Value Date    HGBA1C 9.5 (A) 01/10/2024     Reviewed meds, needs A1c in 2024.  -reviewed diet      4. Essential hypertension  Assessment & Plan:  BP: 180/100, elevated. Will check amb BP for 2 weeks and may need to add new med, likely thiazide diuretic.             Subjective      Followup from urgent care, concern for URI.    Pt has uncontrolled BP. Will check AMB.    Concerns of meds and what is needed vs not.      Review of Systems   Constitutional:  Negative for activity change, appetite change, chills and fever.   HENT:  Negative for ear pain, sore throat and trouble swallowing.    Eyes:  Negative for pain and visual disturbance.   Respiratory:  Negative for cough and shortness of breath.    Cardiovascular:  Negative for chest pain, palpitations and leg swelling.   Gastrointestinal:  Negative for abdominal pain, blood in stool and vomiting.   Endocrine: Negative for polyuria.   Genitourinary:  Negative for difficulty urinating, dysuria and hematuria.   Musculoskeletal:  Negative for arthralgias and back pain.   Skin:  Negative for color change and rash.   Neurological:  Negative for dizziness, seizures and syncope.   Psychiatric/Behavioral:  Negative for behavioral problems.    All other systems reviewed and are negative.      Current Outpatient Medications on File Prior to Visit  "  Medication Sig   • Accu-Chek Guide test strip Use 1 each 2 (two) times a day Use as instructed   • atorvastatin (LIPITOR) 40 mg tablet Take 1 tablet (40 mg total) by mouth daily   • diltiazem (CARDIZEM) 120 MG tablet Take 1 tablet (120 mg total) by mouth 2 (two) times a day   • Easy Comfort Pen Needles 33G X 5 MM MISC Inject under the skin 2 (two) times a day   • Eliquis 5 MG Take 1 tablet (5 mg total) by mouth 2 (two) times a day   • glucose blood (OneTouch Ultra) test strip Use as instructed   • glucose blood (OneTouch Verio) test strip Use 1 each daily Use as instructed   • insulin lispro (HumaLOG) 100 units/mL injection pen Inject 6 Units under the skin 3 (three) times a day with meals   • Lancets (onetouch ultrasoft) lancets Use as instructed   • Lancets (onetouch ultrasoft) lancets Use daily   • lisinopril (ZESTRIL) 40 mg tablet Take 1 tablet (40 mg total) by mouth daily   • metFORMIN (GLUCOPHAGE) 500 mg tablet Take 1 tablet (500 mg total) by mouth 2 (two) times a day with meals   • metoprolol succinate (TOPROL-XL) 100 mg 24 hr tablet Take 1 tablet (100 mg total) by mouth 2 (two) times a day   • tamsulosin (FLOMAX) 0.4 mg Take 1 capsule (0.4 mg total) by mouth daily with dinner   • acetaminophen (Mapap Arthritis Pain) 650 mg CR tablet Take 1 tablet (650 mg total) by mouth 2 (two) times a day (Patient not taking: Reported on 1/10/2024)   • Insulin Glargine Solostar (Lantus SoloStar) 100 UNIT/ML SOPN Inject 0.4 mL (40 Units total) under the skin daily at bedtime       Objective     BP (!) 180/100 (BP Location: Left arm, Patient Position: Sitting, Cuff Size: Standard)   Pulse (!) 106   Temp 97.6 °F (36.4 °C) (Temporal)   Resp 20   Ht 6' 4\" (1.93 m)   Wt 109 kg (241 lb 3.2 oz)   SpO2 99%   BMI 29.36 kg/m²     Physical Exam  Vitals reviewed.   Constitutional:       General: He is not in acute distress.     Appearance: Normal appearance. He is obese. He is not ill-appearing.   HENT:      Head: Normocephalic " and atraumatic.      Nose: Nose normal. No congestion.   Eyes:      General: No scleral icterus.     Conjunctiva/sclera: Conjunctivae normal.      Pupils: Pupils are equal, round, and reactive to light.   Cardiovascular:      Rate and Rhythm: Normal rate and regular rhythm.      Pulses: Normal pulses.      Heart sounds: Normal heart sounds. No murmur heard.  Pulmonary:      Effort: Pulmonary effort is normal.      Breath sounds: Normal breath sounds. No wheezing.   Chest:      Chest wall: No tenderness.   Abdominal:      General: Bowel sounds are normal.      Palpations: There is no mass.      Tenderness: There is no abdominal tenderness.   Musculoskeletal:         General: No tenderness. Normal range of motion.      Cervical back: Normal range of motion.      Right lower leg: No edema.      Left lower leg: No edema.   Skin:     General: Skin is warm.      Capillary Refill: Capillary refill takes less than 2 seconds.      Findings: No bruising or rash.   Neurological:      Mental Status: He is alert and oriented to person, place, and time.   Psychiatric:         Mood and Affect: Mood normal.         Behavior: Behavior normal.       Pieter Torres DO

## 2024-03-15 DIAGNOSIS — E11.40 TYPE 2 DIABETES MELLITUS WITH DIABETIC NEUROPATHY, WITHOUT LONG-TERM CURRENT USE OF INSULIN (HCC): ICD-10-CM

## 2024-03-15 RX ORDER — INSULIN GLARGINE 100 [IU]/ML
40 INJECTION, SOLUTION SUBCUTANEOUS
Qty: 12 ML | Refills: 5 | Status: SHIPPED | OUTPATIENT
Start: 2024-03-15 | End: 2024-04-14

## 2024-03-26 ENCOUNTER — TELEPHONE (OUTPATIENT)
Age: 74
End: 2024-03-26

## 2024-03-26 NOTE — TELEPHONE ENCOUNTER
Pt takes tests sugars breakfast lunch and dinner and takes long lasting insulin after dinner at night before bed. [facility] is saying he's out because he has been doing the testing too frequently, the insructions only say twice a day. But he has been doing this for 9 months. Could you please clarify instructions with patient and renew rx accordingly?

## 2024-03-27 DIAGNOSIS — E11.40 TYPE 2 DIABETES MELLITUS WITH DIABETIC NEUROPATHY, WITHOUT LONG-TERM CURRENT USE OF INSULIN (HCC): ICD-10-CM

## 2024-03-27 RX ORDER — BLOOD SUGAR DIAGNOSTIC
STRIP MISCELLANEOUS
Qty: 200 STRIP | Refills: 12 | Status: SHIPPED | OUTPATIENT
Start: 2024-03-27

## 2024-04-05 ENCOUNTER — OFFICE VISIT (OUTPATIENT)
Dept: FAMILY MEDICINE CLINIC | Facility: CLINIC | Age: 74
End: 2024-04-05
Payer: COMMERCIAL

## 2024-04-05 ENCOUNTER — OFFICE VISIT (OUTPATIENT)
Dept: PODIATRY | Facility: CLINIC | Age: 74
End: 2024-04-05
Payer: COMMERCIAL

## 2024-04-05 VITALS
HEIGHT: 76 IN | DIASTOLIC BLOOD PRESSURE: 61 MMHG | BODY MASS INDEX: 29.35 KG/M2 | WEIGHT: 241 LBS | SYSTOLIC BLOOD PRESSURE: 118 MMHG | HEART RATE: 116 BPM

## 2024-04-05 VITALS
RESPIRATION RATE: 18 BRPM | OXYGEN SATURATION: 97 % | HEIGHT: 76 IN | TEMPERATURE: 97.9 F | WEIGHT: 237.8 LBS | HEART RATE: 72 BPM | SYSTOLIC BLOOD PRESSURE: 138 MMHG | DIASTOLIC BLOOD PRESSURE: 88 MMHG | BODY MASS INDEX: 28.96 KG/M2

## 2024-04-05 DIAGNOSIS — Z89.422 S/P AMPUTATION OF LESSER TOE, LEFT (HCC): ICD-10-CM

## 2024-04-05 DIAGNOSIS — L85.1 ACQUIRED KERATODERMA: ICD-10-CM

## 2024-04-05 DIAGNOSIS — Z89.431 S/P TRANSMETATARSAL AMPUTATION OF FOOT, RIGHT (HCC): ICD-10-CM

## 2024-04-05 DIAGNOSIS — E11.40 TYPE 2 DIABETES MELLITUS WITH DIABETIC NEUROPATHY, WITHOUT LONG-TERM CURRENT USE OF INSULIN (HCC): Primary | ICD-10-CM

## 2024-04-05 DIAGNOSIS — B35.1 TINEA UNGUIUM: ICD-10-CM

## 2024-04-05 DIAGNOSIS — E11.40 TYPE 2 DIABETES MELLITUS WITH DIABETIC NEUROPATHY, WITHOUT LONG-TERM CURRENT USE OF INSULIN (HCC): Primary | Chronic | ICD-10-CM

## 2024-04-05 LAB — SL AMB POCT HEMOGLOBIN AIC: 9.1 (ref ?–6.5)

## 2024-04-05 PROCEDURE — 99214 OFFICE O/P EST MOD 30 MIN: CPT | Performed by: FAMILY MEDICINE

## 2024-04-05 PROCEDURE — 11055 PARING/CUTG B9 HYPRKER LES 1: CPT | Performed by: PODIATRIST

## 2024-04-05 PROCEDURE — 83036 HEMOGLOBIN GLYCOSYLATED A1C: CPT | Performed by: FAMILY MEDICINE

## 2024-04-05 PROCEDURE — 11720 DEBRIDE NAIL 1-5: CPT | Performed by: PODIATRIST

## 2024-04-05 PROCEDURE — 99213 OFFICE O/P EST LOW 20 MIN: CPT | Performed by: PODIATRIST

## 2024-04-05 RX ORDER — CHLORTHALIDONE 25 MG/1
TABLET ORAL
COMMUNITY
Start: 2024-03-15

## 2024-04-05 RX ORDER — PEN NEEDLE, DIABETIC 32GX 5/32"
NEEDLE, DISPOSABLE MISCELLANEOUS 4 TIMES DAILY
Qty: 100 EACH | Refills: 11 | Status: SHIPPED | OUTPATIENT
Start: 2024-04-05

## 2024-04-05 RX ORDER — INSULIN LISPRO 100 [IU]/ML
8 INJECTION, SOLUTION INTRAVENOUS; SUBCUTANEOUS
Qty: 6 ML | Refills: 3 | Status: SHIPPED | OUTPATIENT
Start: 2024-04-05 | End: 2024-08-16

## 2024-04-05 NOTE — PROGRESS NOTES
PATIENT:  Won Gaitan Jr.  1950      ASSESSMENT/PLAN:  1. Type 2 diabetes mellitus with diabetic neuropathy, without long-term current use of insulin (HCC)  Diabetic Shoe Inserts    Diabetic Shoe      2. S/P transmetatarsal amputation of foot, right (HCC)  Diabetic Shoe Inserts    Diabetic Shoe      3. S/P amputation of lesser toe, left (HCC)  Diabetic Shoe Inserts    Diabetic Shoe      4. Tinea unguium  Debridement      5. Acquired keratoderma  Debridement            1. Reviewed medical records.  Patient was counseled on the condition and diagnosis.  Educated disease prevention and risks related to diabetes.    2. Educated proper daily foot care and exam.  Instructed proper skin care / protection and footwear.  Instructed to identify any signs of infection and related foot problem.   3. The recent blood work was reviewed / discussed and the last HbA1c was 9.5.  Discussed proper blood glucose control with diet and exercise.    4. He has high risk diabetic foot with return in 12 weeks.    5. Referred him for new diabetic shoes and inserts.      PROCEDURE:  All mycotic toenails were reduced and debrided in length, width, and girth using a nail nipper and dremel.  All hyperkeratotic skin lesion(s) were sharply pared with a scalpel / forcep with no bleeding or evidence of ulceration.  Patient tolerated procedure(s) well without complications.      HPI:  Won Gaitan Jr. is a 74 y.o.year old male seen for diabetic foot exam.  The patient has class findings with history of right TMA and left 4th ray amp.  BS is under control.  No active ulcer.  The patient denied any acute pedal disorder, redness, acute swelling, or recent injury.          PAST MEDICAL HISTORY:  Past Medical History:   Diagnosis Date    Arthritis     Atrial fibrillation (HCC)     Diagnosed 11/2018    Benign prostate hyperplasia 04/2002    Cellulitis     right lower leg     Colon polyp 2006    Diabetes mellitus (HCC)     Hearing aid worn     bilat  "   Hearing loss     90% loss left ear and 40% right ear    History of clubfoot     \"since birth\"    History of pneumonia     History of TIA (transient ischemic attack) 04/25/2017 11/30/18 pt denies    Hyperlipidemia 5/15/2014    Hypertension     Infectious viral hepatitis     \"cant remember type\"    Irregular heart beat     Afib    Neuropathy     both feet    Osteomyelitis (HCC)     right great toe    Pneumonia     Right middle lobe pulmonary nodule 11/6/2018    Seasickness     Teeth missing     Type 2 diabetes mellitus with diabetic neuropathy (HCC) 11/6/2018    Wears glasses     reading    Wound, open     bottom of right foot       PAST SURGICAL HISTORY:  Past Surgical History:   Procedure Laterality Date    BUNIONECTOMY Right 12/4/2018    Procedure: 5TH METATARSAL BONE PARTIAL RESECTION, FULL THICKNESS DEBRIDEMENT OF DIABETIC ULCER;  Surgeon: Shala Flynn DPM;  [facility];  Service: Podiatry    CLOSURE DELAYED PRIMARY Left 10/30/2023    Procedure: CLOSURE DELAYED PRIMARY left foot;  Surgeon: Minh Urena DPM;  [facility];  Service: Podiatry    CLUB FOOT RELEASE Bilateral     COLONOSCOPY      COMPLEX WOUND CLOSURE TO EXTREMITY  4/27/2021    Procedure: COMPLEX WOUND CLOSURE TO EXTREMITY: RIGHT FOOT;  Surgeon: Niraj Bojorquez MD;  [facility];  Service: Plastics    EXTERNAL FIXATOR APPLICATION Right 2/12/2021    Procedure: Application multiplane external fixation;  Surgeon: Harry Zamudio DPM;  [facility];  Service: Podiatry    FOOT HARDWARE REMOVAL Right 2/17/2021    Procedure: REMOVAL EXTERNAL FIXATOR;  Surgeon: Harry Zamudio DPM;  [facility];  Service: Podiatry    FOREIGN BODY REMOVAL Right 4/27/2021    Procedure: REMOVAL FOREIGN BODY EXTREMITY: RIGHT FOOT;  Surgeon: Niraj Bojorquez MD;  [facility];  Service: Plastics    INCISION AND DRAINAGE OF WOUND Right 2/17/2021    Procedure: INCISION AND DRAINAGE (I&D) EXTREMITY;  " Surgeon: Harry Zamudio DPM;  [facility];  Service: Podiatry    INCISION AND DRAINAGE OF WOUND Left 10/26/2023    Procedure: INCISION AND DRAINAGE (I&D) EXTREMITY; WASHOUT; REMOVAL OF FOREIGN BODY;  Surgeon: Satnam Ponce DPM;  [facility];  Service: Podiatry    ORIF FOOT FRACTURE Right 3/4/2021    Procedure: VAC PLACEMENT; SCREW FIXATION RIGHT FOOT FUSION;  Surgeon: Harry Zamudio DPM;  [facility];  Service: Podiatry    ID AMPUTATION FOOT TRANSMETARSAL Right 2/22/2021    Procedure: AMPUTATION TRANSMETATARSAL (TMA), REMOVAL NAIL IM T2 ICF HARDWARE;  Surgeon: Harry Zamudio DPM;  [facility];  Service: Podiatry    ID AMPUTATION METATARSAL W/TOE SINGLE Left 11/29/2023    Procedure: LEFT FOURTH RAY RESECTION FOOT;  Surgeon: Harry Zamudio DPM;  [facility];  Service: Podiatry    ID ARTHRD MIDTARSL/TARS MLT/TRANSVRS W/OSTEOT Right 2/12/2021    Procedure: foot ARTHRODESIS/FUSION;  Surgeon: Harry Zamudio DPM;  [facility];  Service: Podiatry    ID DEBRIDEMENT BONE 1ST 20 SQ CM/< Right 12/2/2021    Procedure: DEBRIDEMENT OF TARSAL BONES WITH ANITBIODIC BEADS;  Surgeon: Harry Zamudio DPM;  [facility];  Service: Podiatry    ID LNGTH/SHRT TENDON LEG/ANKLE 1 TENDON SPX Right 2/12/2021    Procedure: LENGTHEN TIBIAL TENDON, TRANS HEEL CORD;  Surgeon: Harry Zamudio DPM;  [facility];  Service: Podiatry    ID MUSC MYOCUTANEOUS/FASCIOCUTANEOUS FLAP TRUNK Right 4/12/2021    Procedure: RECONSTRUCTION MICROSURGICAL W/ FREE FLAP;  Surgeon: Niraj Bojorquez MD;  [facility];  Service: Plastics    ID MUSC MYOCUTANEOUS/FASCIOCUTANEOUS FLAP TRUNK Right 4/12/2021    Procedure: TAKEBACK RECONSTRUCTION MICROSURGICAL W/ FREE FLAP;  Surgeon: Niraj Bojorquez MD;  [facility];  Service: Plastics    ID MUSC MYOCUTANEOUS/FASCIOCUTANEOUS FLAP TRUNK Right 4/13/2021    Procedure: RECONSTRUCTION MICROSURGICAL W/ FREE FLAP, RE  EXPLORATION, MICRO VASCULAR REVISION;  Surgeon: Niraj Bojorquze MD;  [facility];  Service: Plastics    WV OSTC PRTL EXOSTC/CONDYLC METAR HEAD Right 12/29/2020    Procedure: EXCISION EXOSTOSIS;  Surgeon: Harry Zamudio DPM;  [facility];  Service: Podiatry    WV SECONDARY CLOSURE SURG WOUND/DEHSN XTNSV/COMP Right 12/29/2020    Procedure: PRIMARY DELAYED CLOSURE;  Surgeon: Harry Zamudio DPM;  [facility];  Service: Podiatry    TOE AMPUTATION Right     partial great toe    TONSILLECTOMY      VAC DRESSING APPLICATION Right 3/1/2021    Procedure: APPLICATION VAC DRESSING EXTREMITY;  Surgeon: Niraj Bojorquez MD;  [facility];  Service: Plastics    VASECTOMY  1992    WOUND DEBRIDEMENT Right 3/1/2021    Procedure: DEBRIDEMENT LOWER EXTREMITY (WASH OUT);  Surgeon: Niraj Boojrquez MD;  [facility];  Service: Plastics    WOUND DEBRIDEMENT Right 4/7/2021    Procedure: DEBRIDEMENT RIGHT FOOT;  Surgeon: Niraj Bojorquez MD;  [facility];  Service: Plastics    WOUND DEBRIDEMENT Right 4/12/2021    Procedure: DEBRIDEMENT LOWER EXTREMITY (WASH OUT);  Surgeon: Niraj Bojroquez MD;  [facility];  Service: Plastics    WOUND DEBRIDEMENT Right 4/27/2021    Procedure: DEBRIDEMENT LOWER EXTREMITY (WASH OUT): RIGHT FOOT;  Surgeon: Niraj Bojorquez MD;  [facility];  Service: Plastics        ALLERGIES:  Simvastatin and Itraconazole    MEDICATIONS:  Current Outpatient Medications   Medication Sig Dispense Refill    Accu-Chek Guide test strip Use as instructed tid 200 strip 12    atorvastatin (LIPITOR) 40 mg tablet Take 1 tablet (40 mg total) by mouth daily 90 tablet 3    diltiazem (CARDIZEM) 120 MG tablet Take 1 tablet (120 mg total) by mouth 2 (two) times a day 180 tablet 0    Easy Comfort Pen Needles 33G X 5 MM MISC Inject under the skin 2 (two) times a day 100 each 6    Eliquis 5 MG Take 1 tablet (5 mg total) by  mouth 2 (two) times a day 60 tablet 11    Insulin Glargine Solostar 100 UNIT/ML SOPN INJECT 0.4 ML (40 UNITS TOTAL) UNDER THE SKIN DAILY AT BEDTIME 12 mL 5    insulin lispro (HumaLOG) 100 units/mL injection pen Inject 6 Units under the skin 3 (three) times a day with meals 6 mL 3    Lancets (onetouch ultrasoft) lancets Use as instructed 100 each 3    Lancets (onetouch ultrasoft) lancets Use daily 100 each 3    lisinopril (ZESTRIL) 40 mg tablet Take 1 tablet (40 mg total) by mouth daily 90 tablet 3    metFORMIN (GLUCOPHAGE) 500 mg tablet Take 1 tablet (500 mg total) by mouth 2 (two) times a day with meals 180 tablet 5    metoprolol succinate (TOPROL-XL) 100 mg 24 hr tablet Take 1 tablet (100 mg total) by mouth 2 (two) times a day 180 tablet 0    tamsulosin (FLOMAX) 0.4 mg Take 1 capsule (0.4 mg total) by mouth daily with dinner 90 capsule 2    acetaminophen (Mapap Arthritis Pain) 650 mg CR tablet Take 1 tablet (650 mg total) by mouth 2 (two) times a day (Patient not taking: Reported on 1/10/2024) 60 tablet 1     No current facility-administered medications for this visit.       SOCIAL HISTORY:  Social History     Socioeconomic History    Marital status: /Civil Union     Spouse name: None    Number of children: None    Years of education: None    Highest education level: None   Occupational History    None   Tobacco Use    Smoking status: Former     Current packs/day: 0.00     Types: Cigarettes     Quit date: 1988     Years since quittin.4    Smokeless tobacco: Former    Tobacco comments:     exposure to passive smoke   Vaping Use    Vaping status: Never Used   Substance and Sexual Activity    Alcohol use: Yes     Comment: occasionally    Drug use: Not Currently    Sexual activity: Yes     Partners: Female   Other Topics Concern    None   Social History Narrative    None     Social Determinants of Health     Financial Resource Strain: Not on file   Food Insecurity: No Food Insecurity (10/25/2023)     "Hunger Vital Sign     Worried About Running Out of Food in the Last Year: Never true     Ran Out of Food in the Last Year: Never true   Transportation Needs: No Transportation Needs (10/25/2023)    PRAPARE - Transportation     Lack of Transportation (Medical): No     Lack of Transportation (Non-Medical): No   Physical Activity: Not on file   Stress: Not on file   Social Connections: Not on file   Intimate Partner Violence: Not on file   Housing Stability: Unknown (10/25/2023)    Housing Stability Vital Sign     Unable to Pay for Housing in the Last Year: No     Number of Places Lived in the Last Year: Not on file     Unstable Housing in the Last Year: No        REVIEW OF SYSTEMS:  GENERAL: No fever or chills  HEART: No chest pain, or palpitation  RESPIRATORY:  No acute SOB or cough  GI: No Nausea, vomit or diarrhea  NEUROLOGIC: No syncope or acute weakness    PHYSICAL EXAM:    /61   Pulse (!) 116   Ht 6' 4\" (1.93 m)   Wt 109 kg (241 lb)   BMI 29.34 kg/m²     VASCULAR EXAM  Dorsalis pedis  absent, Posterior tibial artery  +1.  The patient has class findings with skin atrophy, lack of digital hair, and nail dystrophy.  There is +1 lower extremity edema bilaterally.  Venous stasis skin changes noted BLE.    NEUROLOGIC EXAM  Sensation is intact to light touch.  Sensation is absent to 10gm monofilament.  No focal neurologic deficit.          DERMATOLOGIC EXAM:   No ulcer or cellulitis noted.  The patient has hypertrophic toenails X 4 with discoloration, onycholysis, and subungal debris.  No notable skin lesion.  Patient has hyperkeratosis in right lateral TMA stump.    MUSCULOSKELETAL EXAM:   No acute joint pain, edema, or redness.  No acute musculoskeletal problem.  Patient has deformity including hammertoes.  S/P left 4th ray amp and right TMA.          "

## 2024-04-05 NOTE — ASSESSMENT & PLAN NOTE
Lab Results   Component Value Date    HGBA1C 9.1 (A) 04/05/2024     Improving, will order pharm consult for CGM set up.

## 2024-04-05 NOTE — PROGRESS NOTES
Name: Won Gaitan Jr.      : 1950      MRN: [id number]  Encounter Provider: Pieter Torres DO  Encounter Date: 2024   Encounter department: [facility]    Assessment & Plan     1. Type 2 diabetes mellitus with diabetic neuropathy, without long-term current use of insulin ([facility])  Assessment & Plan:    Lab Results   Component Value Date    HGBA1C 9.1 (A) 2024     Improving, will order pharm consult for CGM set up.    Orders:  -     POCT hemoglobin A1c  -     Ambulatory referral to clinical pharmacy; Future  -     insulin lispro (HumaLOG) 100 units/mL injection pen; Inject 8 Units under the skin 3 (three) times a day with meals  -     Easy Comfort Pen Needles 33G X 5 MM MISC; Inject under the skin 4 (four) times a day           Subjective      Diabetes  He presents for his follow-up diabetic visit. He has type 2 diabetes mellitus. His disease course has been stable. Hypoglycemia symptoms include sleepiness. Pertinent negatives for hypoglycemia include no seizures. Associated symptoms include fatigue. Pertinent negatives for diabetes include no chest pain. There are no hypoglycemic complications. Symptoms are stable. There are no diabetic complications. Risk factors for coronary artery disease include diabetes mellitus, dyslipidemia, hypertension, male sex and sedentary lifestyle. Current diabetic treatment includes oral agent (dual therapy) and insulin injections. He is compliant with treatment all of the time. An ACE inhibitor/angiotensin II receptor blocker is being taken. Eye exam is current.     Review of Systems   Constitutional:  Positive for fatigue. Negative for chills and fever.   HENT:  Negative for ear pain and sore throat.    Eyes:  Negative for pain and visual disturbance.   Respiratory:  Negative for cough and shortness of breath.    Cardiovascular:  Negative for chest pain and palpitations.   Gastrointestinal:  Negative for abdominal pain and vomiting.  "  Genitourinary:  Negative for dysuria and hematuria.   Musculoskeletal:  Positive for gait problem. Negative for arthralgias and back pain.   Skin:  Negative for color change and rash.   Neurological:  Negative for seizures and syncope.   All other systems reviewed and are negative.      Current Outpatient Medications on File Prior to Visit   Medication Sig   • Accu-Chek Guide test strip Use as instructed tid   • acetaminophen (Mapap Arthritis Pain) 650 mg CR tablet Take 1 tablet (650 mg total) by mouth 2 (two) times a day   • atorvastatin (LIPITOR) 40 mg tablet Take 1 tablet (40 mg total) by mouth daily   • chlorthalidone 25 mg tablet    • diltiazem (CARDIZEM) 120 MG tablet Take 1 tablet (120 mg total) by mouth 2 (two) times a day   • Eliquis 5 MG Take 1 tablet (5 mg total) by mouth 2 (two) times a day   • Insulin Glargine Solostar 100 UNIT/ML SOPN INJECT 0.4 ML (40 UNITS TOTAL) UNDER THE SKIN DAILY AT BEDTIME   • Lancets (onetouch ultrasoft) lancets Use as instructed   • Lancets (onetouch ultrasoft) lancets Use daily   • lisinopril (ZESTRIL) 40 mg tablet Take 1 tablet (40 mg total) by mouth daily   • metFORMIN (GLUCOPHAGE) 500 mg tablet Take 1 tablet (500 mg total) by mouth 2 (two) times a day with meals   • metoprolol succinate (TOPROL-XL) 100 mg 24 hr tablet Take 1 tablet (100 mg total) by mouth 2 (two) times a day   • tamsulosin (FLOMAX) 0.4 mg Take 1 capsule (0.4 mg total) by mouth daily with dinner   • [DISCONTINUED] Easy Comfort Pen Needles 33G X 5 MM MISC Inject under the skin 2 (two) times a day   • [DISCONTINUED] insulin lispro (HumaLOG) 100 units/mL injection pen Inject 6 Units under the skin 3 (three) times a day with meals       Objective     /88   Pulse 72   Temp 97.9 °F (36.6 °C) (Temporal)   Resp 18   Ht 6' 4\" (1.93 m)   Wt 108 kg (237 lb 12.8 oz)   SpO2 97%   BMI 28.95 kg/m²     Physical Exam  Vitals reviewed.   Constitutional:       General: He is not in acute distress.     " Appearance: Normal appearance. He is obese. He is not ill-appearing.   HENT:      Head: Normocephalic and atraumatic.      Nose: Nose normal. No congestion.   Eyes:      General: No scleral icterus.     Conjunctiva/sclera: Conjunctivae normal.      Pupils: Pupils are equal, round, and reactive to light.   Cardiovascular:      Rate and Rhythm: Normal rate and regular rhythm.      Pulses: Normal pulses. no weak pulses.           Dorsalis pedis pulses are 2+ on the right side and 2+ on the left side.        Posterior tibial pulses are 2+ on the right side and 2+ on the left side.      Heart sounds: Normal heart sounds. No murmur heard.  Pulmonary:      Effort: Pulmonary effort is normal.      Breath sounds: Normal breath sounds. No wheezing.   Chest:      Chest wall: No tenderness.   Abdominal:      General: Bowel sounds are normal.      Palpations: There is no mass.      Tenderness: There is no abdominal tenderness.   Musculoskeletal:         General: No tenderness. Normal range of motion.      Cervical back: Normal range of motion.      Right lower leg: No edema.      Left lower leg: No edema.   Feet:      Right foot:      Skin integrity: No ulcer, skin breakdown, erythema, warmth, callus or dry skin.      Left foot:      Skin integrity: No ulcer, skin breakdown, erythema, warmth, callus or dry skin.   Skin:     General: Skin is warm.      Capillary Refill: Capillary refill takes less than 2 seconds.      Findings: No bruising or rash.   Neurological:      Mental Status: He is alert and oriented to person, place, and time.      Motor: Weakness present.      Gait: Gait abnormal.   Psychiatric:         Mood and Affect: Mood normal.         Behavior: Behavior normal.         Patient's shoes and socks removed.    Right Foot/Ankle   Right Foot Inspection  Skin Exam: skin normal and skin intact. No dry skin, no warmth, no callus, no erythema, no maceration, no abnormal color, no pre-ulcer, no ulcer and no callus.     Toe  Exam: ROM and strength within normal limits. No swelling, no tenderness, erythema and  no right toe deformity    Sensory   Vibration: intact  Proprioception: intact  Monofilament testing: intact    Vascular  Capillary refills: < 3 seconds  The right DP pulse is 2+. The right PT pulse is 2+.     Left Foot/Ankle  Left Foot Inspection  Skin Exam: skin normal and skin intact. No dry skin, no warmth, no erythema, no maceration, normal color, no pre-ulcer, no ulcer and no callus.     Toe Exam: ROM and strength within normal limits. No swelling, no tenderness, no erythema and no left toe deformity.     Sensory   Vibration: intact  Proprioception: intact  Monofilament testing: intact    Vascular  Capillary refills: < 3 seconds  The left DP pulse is 2+. The left PT pulse is 2+.     Assign Risk Category  No deformity present  No loss of protective sensation  No weak pulses  Risk: 0      Pieter Torres,

## 2024-04-08 ENCOUNTER — TELEPHONE (OUTPATIENT)
Age: 74
End: 2024-04-08

## 2024-04-08 NOTE — TELEPHONE ENCOUNTER
Georgie from [facility] [phone] called asking for documentation regarding wound care from 9/1/2023- 12/1/2023 in order to bill insurance for wound supplies. Fax number provided and will fax paperwork to office.

## 2024-04-09 ENCOUNTER — TELEPHONE (OUTPATIENT)
Dept: FAMILY MEDICINE CLINIC | Facility: CLINIC | Age: 74
End: 2024-04-09

## 2024-04-09 DIAGNOSIS — E11.40 TYPE 2 DIABETES MELLITUS WITH DIABETIC NEUROPATHY, WITHOUT LONG-TERM CURRENT USE OF INSULIN (HCC): Primary | ICD-10-CM

## 2024-04-09 LAB
DME PARACHUTE DELIVERY DATE REQUESTED: NORMAL
DME PARACHUTE ITEM DESCRIPTION: NORMAL
DME PARACHUTE ORDER STATUS: NORMAL
DME PARACHUTE SUPPLIER NAME: NORMAL
DME PARACHUTE SUPPLIER PHONE: NORMAL

## 2024-04-09 NOTE — TELEPHONE ENCOUNTER
[facility]  Juan Manuel Villalobos, Ailyn     Reason for Outreach: Patient referred to clinical pharmacist by Pieter Torres DO .     Patient is interested in CGM. Will place order for CGM to [facility] through [facility].    Orders placed under CPA     Plan to schedule appointment with patient after CGM is obtained.

## 2024-04-16 LAB
DME PARACHUTE DELIVERY DATE ACTUAL: NORMAL
DME PARACHUTE DELIVERY DATE REQUESTED: NORMAL
DME PARACHUTE ITEM DESCRIPTION: NORMAL
DME PARACHUTE ORDER STATUS: NORMAL
DME PARACHUTE SUPPLIER NAME: NORMAL
DME PARACHUTE SUPPLIER PHONE: NORMAL

## 2024-04-18 ENCOUNTER — TELEPHONE (OUTPATIENT)
Age: 74
End: 2024-04-18

## 2024-04-18 NOTE — TELEPHONE ENCOUNTER
Patient called in stating he received his [employer]. He is unsire who he needs to make an appointment with to get training. I looked in his chart and was unable to find a phone number. I offered an appointment this Friday but he denied as he wants to be sure he sees the right person and he has many other appointments Friday. Please call patient to advise further.

## 2024-04-19 DIAGNOSIS — L03.116 CELLULITIS OF LEFT LOWER LEG: ICD-10-CM

## 2024-04-19 NOTE — TELEPHONE ENCOUNTER
Pt called in requesting for the following med Diltiazem 120 mg tab once the prescription is approved kindly send in his regular pharmacy as pt is running out of med. Thanks

## 2024-04-21 DIAGNOSIS — E11.40 TYPE 2 DIABETES MELLITUS WITH DIABETIC NEUROPATHY, WITHOUT LONG-TERM CURRENT USE OF INSULIN (HCC): Primary | ICD-10-CM

## 2024-04-22 RX ORDER — DILTIAZEM HYDROCHLORIDE 120 MG/1
120 TABLET, FILM COATED ORAL 2 TIMES DAILY
Qty: 180 TABLET | Refills: 0 | Status: SHIPPED | OUTPATIENT
Start: 2024-04-22 | End: 2024-07-21

## 2024-04-27 NOTE — PROGRESS NOTES
[facility]   Juan Manuel Villalobos, PharmD    Won Gaitan Jr. is a 74 y.o. male who was referred to the clinical pharmacist by Samuel Ornelas MD for diabetes. Patient and wife present in-person for initial clinical pharmacist consult.     Plan:   The following actions were taken today by the Clinical Pharmacist:   1. Met with Won Gaitan Jr. for Taylor 3 CGM training. Patient will call with questions or for more education as needed.    Follow-up:   Follow-up with pharmacist in 2 weeks  Next PCP visit: 7/12/2024    - Home Monitoring Records: CGM data  - Labs: A1c due on or after 7/3/2024    The following items are to be considered at a future visit:   1. Taper off Humalog  2. Maximize metformin  3. Start GLP-1  Chronic Conditions Addressed at this Visit:   Diabetes: Goals - A1c: <7%; Time In Range: >70%: per ADA Guidelines.  - Most recent A1c: above goal.   - CGM TIR: N/A.  Current DM Regimen:  Humalog 8 units SQ AC  Lantus 40 units SQ HS (0.37 units/kg)  Metformin 500 mg BID  MEDICATIONS: No changes at this time. Will check AGP and evaluate..  HOME MONITORING: Check glucose levels with CGM and record.    Medication Reconciliation: Medication list reviewed with patient at today's visit and updated to reflect medications patient is currently taking.  - Medication adherence is good.     Education:     Freestyle Taylor 3 CGM successfully placed on patient   Skin cleaned with alcohol  Unscrew applicator; peel off sensor  Combine applicator/sensor by lining up the 2 lines and push until it clicks  Sensor inserted and started   Insertion Site: right arm  Communication with phone verified  Taylor  was programmed with customized high and low settings as appropriate.  Patient left my office with the Taylor on and in warm up mode.   Patient instructions reviewed:   Sensor is waterproof for showering and swimming  Remove for MRI, CT  Remove if redness, bleeding, swelling, discomfort at  site  Replace sensor in 14 days  Scan every 8 hours for blood sugar   Glucometer verification: confirm CGM reading if needed  Patient was provided information on [employer] so that they can upload from home as needed and we will download the  when in office.   Patient did create a [employer] account and link to our office   Educated patient on clinical targets for CGM data    Subjective:     DM History:  Microvascular complications: nephropathy and peripheral neuropathy; S/P transmetatarsal amputation of R foot;  S/P amputation of L lesser toe   Cardiovascular complications: none  - Aspirin: No; Eliquis - Statin: Yes   - ACEi/ARB: Yes    Previous DM medications:   Januvia  Glimepiride    CV Risk Factors:  - Family history of premature ASCVD: none reported - HTN: Yes - CKD: No   - Obesity: No; overweight - Dyslipidemia: Yes - Albuminuria: Yes   - Smoking: Former      - HF: No     - Thyroid issues: No  - Pancreatitis: No    DM Self-Management:  - Self-monitoring: are performed regularly.  He checks BG  2-3 times daily, including FBG and pre-meal. He did not provide blood glucose log today.  - SMBG readings (no log, per memory): 180s (fasting); 210 (pre-lunch/dinner)  - Hypoglycemia: (+) awareness; denies any recent episodes  Glucometer: Yes, Brand: Accu-Check Guide Me  CGM: Yes, Brand: Freestyle Taylor 3    Medication Adherence: Responsible for medication management: patient. Medication adherence: taking as prescribed. Patient denies missed/extra doses.     Medication Efficacy/Safety:  Clinically significant drug interactions identified:  None  Side effects from medication(s) reported:  None    Objective:     Current Outpatient Medications   Medication Instructions    Accu-Chek Guide test strip Use as instructed tid    acetaminophen (MAPAP ARTHRITIS PAIN) 650 mg, Oral, 2 times daily    atorvastatin (LIPITOR) 40 mg, Oral, Daily    chlorthalidone 25 mg tablet     Continuous Blood Gluc Sensor (FreeStyle Taylor 3  "Sensor) MISC 1 each, Does not apply, Every 14 days    diltiazem (CARDIZEM) 120 mg, Oral, 2 times daily    Easy Comfort Pen Needles 33G X 5 MM MISC Subcutaneous, 4 times daily    Eliquis 5 mg, Oral, 2 times daily    Insulin Glargine Solostar 40 Units, Subcutaneous, Daily at bedtime    insulin lispro (HUMALOG) 8 Units, Subcutaneous, 3 times daily with meals    Lancets (onetouch ultrasoft) lancets Use as instructed    Lancets (onetouch ultrasoft) lancets Other, Daily    lisinopril (ZESTRIL) 40 mg, Oral, Daily    metFORMIN (GLUCOPHAGE) 500 mg, Oral, 2 times daily with meals    metoprolol succinate (TOPROL-XL) 100 mg, Oral, 2 times daily    tamsulosin (FLOMAX) 0.4 mg, Oral, Daily with dinner     I have reviewed the patient's allergies, medications and history as noted in the electronic medical record and updated as necessary.    Vital Signs:  BP Readings from Last 3 Encounters:   04/05/24 138/88   04/05/24 118/61   03/08/24 (!) 180/100     Pulse Readings from Last 3 Encounters:   04/05/24 72   04/05/24 (!) 116   03/08/24 (!) 106      Estimated body mass index is 28.95 kg/m² as calculated from the following:    Height as of 4/5/24: 6' 4\" (1.93 m).    Weight as of 4/5/24: 108 kg (237 lb 12.8 oz).     Pertinent Lab Data:  Lab Results   Component Value Date    SODIUM 139 12/01/2023    K 3.9 12/01/2023     12/01/2023    CO2 26 12/01/2023    BUN 18 12/01/2023    CREATININE 1.02 12/01/2023    GLUC 97 12/01/2023    CALCIUM 9.1 12/01/2023     Lab Results   Component Value Date    HGBA1C 9.1 (A) 04/05/2024      Preventative Care:  Eye Exam: Overdue; no DR in 2022  Foot Exam: Up to date; followed by Podiatry  Dental exam: Overdue  Vaccination: Immunizations not up to date per review of EHR (meningococcal, Hep A & B, zoster)    Pharmacist Tracking Tool:   Reason For Outreach: Embedded Pharmacist  Demographics:  Intervention Method: In Person  Type of Intervention: New  Topics Addressed: Diabetes  Pharmacologic Interventions: " N/A  Non-Pharmacologic Interventions: Care coordination, Disease state education, Medication/Device education, and Personal CGM  Time:  Direct Patient Care:  60  mins  Care Coordination:  25  mins  Recommendation Recipient: Patient/Caregiver  Outcome: Accepted    Juan Manuel MorrisonD  Clinical Integration Pharmacist  [facility]  [phone]

## 2024-04-29 ENCOUNTER — CLINICAL SUPPORT (OUTPATIENT)
Dept: FAMILY MEDICINE CLINIC | Facility: CLINIC | Age: 74
End: 2024-04-29

## 2024-04-29 DIAGNOSIS — E11.40 TYPE 2 DIABETES MELLITUS WITH DIABETIC NEUROPATHY, WITHOUT LONG-TERM CURRENT USE OF INSULIN (HCC): ICD-10-CM

## 2024-04-29 RX ORDER — KETOROLAC TROMETHAMINE 30 MG/ML
1 INJECTION, SOLUTION INTRAMUSCULAR; INTRAVENOUS ONCE
COMMUNITY
End: 2024-04-29

## 2024-04-29 RX ORDER — BLOOD-GLUCOSE SENSOR
1 EACH MISCELLANEOUS
COMMUNITY

## 2024-04-29 NOTE — PATIENT INSTRUCTIONS
[facility]  Juan Manuel Villalobos, Ailyn      Some important things to remember regarding the Freestyle Taylor 3 system:    1) Change the sensor every 14 days.  Always rotate arms.   2) New sensors require a 60 minute warm up period, then you can start checking your blood sugar readings  3) Sensor must be removed for x-ray, CT, MRI, diathermy, or any radiation treatment.     4) Sensor can be worn while bathing, showering, or swimming for no longer than 30 minutes and/or deeper than 3 feet.    5) To receive alarms, your reader should be turned on, within 33 feet of you, and unobstructed at all times. If your reader is out of range of your sensor, you may not receive glucose alarms     If you are severely dehydrated, the results may inaccurate. Taking ascorbic acid (vitamin C) supplements while wearing the sensor may falsely raise sensor glucose readings. You can take doses of ascorbic acid up to 500 mg per day and make treatment decisions with the sensor. Taking more than 500 mg of ascorbic acid per day may affect the sensor readings which could cause you to miss a severe low glucose event.    If your sensor ever falls off prior to 14 days, please call the [employer] service number at [phone] for assistance.    Link to helpful videos/resources:  [email or web address]

## 2024-04-29 NOTE — LETTER
Won Gaitan Jr.  1950    As a reminder, I am the clinical pharmacist who works with Pieter Torres DO. I am reaching out to follow-up on the FreeStyle Taylor 3 CGM that we set up during our recent appointment on Monday, 4/29/2024. In order to review the information that you obtain from your reader, I am requesting that you complete the following steps:    Create A Disconnect Account: Disconnect is a secure, online diabetes management system    Step 1: Sign Up  Go to [email or web address] and click the Sign Up button. Select the radio button next to Disconnect Account and click the Continue button.    Step 2: Enter Account Information  Confirm your Country / Region of Residence and accept the Terms of Use and Privacy Notice. Once you have completed your registration, you will be prompted to verify your email address.    Step 3: Verify your Email  When you log into your email, look for an email for Disconnect. In the email, click the link to Verify your email address. Then return to the Disconnect email verification screen and click Next. You will now be logged into your account.    Step 4: Complete 2-factor authentication (optional)  If enabled for you, complete 2-factor authentication. 2-factor authentication provides extra protection for your account by requiring a password and security code to log in. When prompted, enter the SMS enabled phone number or e-mail address that you would like the 6-digit security code to be delivered to. Your phone number and e-mail address provided to complete this step will be used for security verification only. Once the code is entered, you can select “Remember Me” if you would like Disconnect to not ask for a code each time you log in.     You will now be logged into your account.                                                Upload Data from the CGM Minco       Step 1: Log in to Disconnect  Go to [email or web address] and type in your username and password. Click the Log in button to access  your account.    Step 2: Connect Device  Connect one end of the cable to the CMG reader and the other end of the cable to an open USB port on your computer using the charging cable that came with your CGM system.    Step 3: Upload Glucose Data  Click the Press to Begin Upload button on your computer.    Step 4: Install LibrSimplyTapp Device Drivers if this is the first upload  If this is your first time uploading a glucose device to this computer, you will need to download the software that allows [employer] to gather data from your glucose device. You will be prompted to download the LibrGudogiew Device Drivers during the first upload. You can also click on the Download the LibreView Device Drivers link from the Upload screen to initiate the download . Click on the appropriate button that corresponds with your computer's Operating System. Next, follow the prompts to install the software. You will only need to do this once.     Step 5: View Reports  Once the software is installed, you will be able to start uploading glucose devices and viewing reports. Upon a successful device upload, you will be brought to the My Glucose section.          Completing these steps allows you to share your glucose data with our office. Then I am able to review the data and discover glucose patterns and trends so that we can make informed decisions about your diabetes. In order to provide accurate, up-to-date information, please upload data from the CGM reader every 2 weeks.        Please let me know if you have any questions or concerns.     Thank you,               Juan Manuel Villalobos, PharmD, BCPS, BCACP  Clinical Integration Pharmacist  [phone]

## 2024-05-07 DIAGNOSIS — L03.116 CELLULITIS OF LEFT LOWER LEG: ICD-10-CM

## 2024-05-07 RX ORDER — METOPROLOL SUCCINATE 100 MG/1
100 TABLET, EXTENDED RELEASE ORAL 2 TIMES DAILY
Qty: 180 TABLET | Refills: 1 | Status: SHIPPED | OUTPATIENT
Start: 2024-05-07 | End: 2024-11-03

## 2024-05-09 ENCOUNTER — TELEPHONE (OUTPATIENT)
Age: 74
End: 2024-05-09

## 2024-05-09 NOTE — TELEPHONE ENCOUNTER
Called [facility] and spoke to Mannie. Gave her updated fax number to send order to. Fax: [phone].

## 2024-05-09 NOTE — TELEPHONE ENCOUNTER
Caller: Julian    Doctor and/or Office: Dr. Ponce/Paz    #: [phone]    Escalation: Linda Feliz will be faxing a detailed written order for this patient to the fax number ending in [phone]. She previously sent it to the wrong fax. Thank you

## 2024-05-13 ENCOUNTER — HOSPITAL ENCOUNTER (EMERGENCY)
Facility: HOSPITAL | Age: 74
Discharge: HOME | End: 2024-05-13
Attending: EMERGENCY MEDICINE
Payer: COMMERCIAL

## 2024-05-13 ENCOUNTER — TELEPHONE (OUTPATIENT)
Dept: PODIATRY | Facility: CLINIC | Age: 74
End: 2024-05-13

## 2024-05-13 ENCOUNTER — TELEPHONE (OUTPATIENT)
Age: 74
End: 2024-05-13

## 2024-05-13 ENCOUNTER — APPOINTMENT (EMERGENCY)
Dept: RADIOLOGY | Facility: HOSPITAL | Age: 74
End: 2024-05-13
Payer: COMMERCIAL

## 2024-05-13 VITALS
HEART RATE: 85 BPM | SYSTOLIC BLOOD PRESSURE: 148 MMHG | TEMPERATURE: 97.9 F | DIASTOLIC BLOOD PRESSURE: 82 MMHG | RESPIRATION RATE: 16 BRPM | OXYGEN SATURATION: 100 %

## 2024-05-13 DIAGNOSIS — L97.509 DIABETIC FOOT ULCER (HCC): ICD-10-CM

## 2024-05-13 DIAGNOSIS — S91.105A OPEN WOUND OF FOURTH TOE OF LEFT FOOT, INITIAL ENCOUNTER: Primary | ICD-10-CM

## 2024-05-13 DIAGNOSIS — E11.621 DIABETIC FOOT ULCER (HCC): ICD-10-CM

## 2024-05-13 LAB
ALBUMIN SERPL BCP-MCNC: 4.1 G/DL (ref 3.5–5)
ALP SERPL-CCNC: 62 U/L (ref 34–104)
ALT SERPL W P-5'-P-CCNC: 15 U/L (ref 7–52)
ANION GAP SERPL CALCULATED.3IONS-SCNC: 6 MMOL/L (ref 4–13)
AST SERPL W P-5'-P-CCNC: 13 U/L (ref 13–39)
BASOPHILS # BLD AUTO: 0.04 THOUSANDS/ÂΜL (ref 0–0.1)
BASOPHILS NFR BLD AUTO: 1 % (ref 0–1)
BILIRUB SERPL-MCNC: 0.57 MG/DL (ref 0.2–1)
BUN SERPL-MCNC: 25 MG/DL (ref 5–25)
CALCIUM SERPL-MCNC: 9.6 MG/DL (ref 8.4–10.2)
CHLORIDE SERPL-SCNC: 104 MMOL/L (ref 96–108)
CO2 SERPL-SCNC: 27 MMOL/L (ref 21–32)
CREAT SERPL-MCNC: 1.04 MG/DL (ref 0.6–1.3)
CRP SERPL QL: 1.7 MG/L
EOSINOPHIL # BLD AUTO: 0.24 THOUSAND/ÂΜL (ref 0–0.61)
EOSINOPHIL NFR BLD AUTO: 3 % (ref 0–6)
ERYTHROCYTE [DISTWIDTH] IN BLOOD BY AUTOMATED COUNT: 13.2 % (ref 11.6–15.1)
ERYTHROCYTE [SEDIMENTATION RATE] IN BLOOD: 14 MM/HOUR (ref 0–19)
GFR SERPL CREATININE-BSD FRML MDRD: 71 ML/MIN/1.73SQ M
GLUCOSE SERPL-MCNC: 133 MG/DL (ref 65–140)
HCT VFR BLD AUTO: 42.8 % (ref 36.5–49.3)
HGB BLD-MCNC: 14.4 G/DL (ref 12–17)
IMM GRANULOCYTES # BLD AUTO: 0.02 THOUSAND/UL (ref 0–0.2)
IMM GRANULOCYTES NFR BLD AUTO: 0 % (ref 0–2)
LYMPHOCYTES # BLD AUTO: 2.32 THOUSANDS/ÂΜL (ref 0.6–4.47)
LYMPHOCYTES NFR BLD AUTO: 29 % (ref 14–44)
MCH RBC QN AUTO: 31.6 PG (ref 26.8–34.3)
MCHC RBC AUTO-ENTMCNC: 33.6 G/DL (ref 31.4–37.4)
MCV RBC AUTO: 94 FL (ref 82–98)
MONOCYTES # BLD AUTO: 0.58 THOUSAND/ÂΜL (ref 0.17–1.22)
MONOCYTES NFR BLD AUTO: 7 % (ref 4–12)
NEUTROPHILS # BLD AUTO: 4.89 THOUSANDS/ÂΜL (ref 1.85–7.62)
NEUTS SEG NFR BLD AUTO: 60 % (ref 43–75)
NRBC BLD AUTO-RTO: 0 /100 WBCS
PLATELET # BLD AUTO: 216 THOUSANDS/UL (ref 149–390)
PMV BLD AUTO: 11.1 FL (ref 8.9–12.7)
POTASSIUM SERPL-SCNC: 4 MMOL/L (ref 3.5–5.3)
PROT SERPL-MCNC: 6.8 G/DL (ref 6.4–8.4)
RBC # BLD AUTO: 4.55 MILLION/UL (ref 3.88–5.62)
SODIUM SERPL-SCNC: 137 MMOL/L (ref 135–147)
WBC # BLD AUTO: 8.09 THOUSAND/UL (ref 4.31–10.16)

## 2024-05-13 PROCEDURE — 85652 RBC SED RATE AUTOMATED: CPT

## 2024-05-13 PROCEDURE — 86140 C-REACTIVE PROTEIN: CPT

## 2024-05-13 PROCEDURE — 36000 PLACE NEEDLE IN VEIN: CPT | Performed by: EMERGENCY MEDICINE

## 2024-05-13 PROCEDURE — 36415 COLL VENOUS BLD VENIPUNCTURE: CPT

## 2024-05-13 PROCEDURE — 99284 EMERGENCY DEPT VISIT MOD MDM: CPT

## 2024-05-13 PROCEDURE — 99285 EMERGENCY DEPT VISIT HI MDM: CPT | Performed by: EMERGENCY MEDICINE

## 2024-05-13 PROCEDURE — 85025 COMPLETE CBC W/AUTO DIFF WBC: CPT

## 2024-05-13 PROCEDURE — 76942 ECHO GUIDE FOR BIOPSY: CPT | Performed by: EMERGENCY MEDICINE

## 2024-05-13 PROCEDURE — 73630 X-RAY EXAM OF FOOT: CPT

## 2024-05-13 PROCEDURE — 80053 COMPREHEN METABOLIC PANEL: CPT

## 2024-05-13 RX ORDER — CEPHALEXIN 500 MG/1
500 CAPSULE ORAL EVERY 6 HOURS SCHEDULED
Qty: 28 CAPSULE | Refills: 0 | Status: SHIPPED | OUTPATIENT
Start: 2024-05-13 | End: 2024-05-20

## 2024-05-13 NOTE — TELEPHONE ENCOUNTER
Please see below message. Patient's wife calling in to let Dr. Ponce know the patient is going to the ED today as she feels he needs to be seen ASAP/antibiotics. They would like a return call from the office. Thank you

## 2024-05-13 NOTE — DISCHARGE INSTRUCTIONS
Please use Keflex and Bactroban as prescribed.  Please go directly scheduled podiatry appointment.    Please return if you develop any new or concerning symptoms including worsening of your wound, high fevers, or drainage from your wound.

## 2024-05-13 NOTE — TELEPHONE ENCOUNTER
Called PT to book a sooner appt for 16th of May.But Pt wife has stated that he might not go because he will be golfing

## 2024-05-13 NOTE — ED NOTES
Pt did not want a surgical shoe.  Pt stated he has a shoe at home and does not want to be billed for one.      Carmen Agudelo RN  05/13/24 [id number]

## 2024-05-13 NOTE — ED PROVIDER NOTES
History  Chief Complaint   Patient presents with    Wound Check     Patient has a wound on his left toe that he would like checked. He states this started two days ago, and is getting worse      Patient is a 74-year-old male with history of diabetes, atrial fibrillation, history of osteomyelitis status post TMA of the right foot who presents for wound check.  Patient states that for the past 2 days he has noticed a wound on his left third toe.  He has no sensation in that foot so is not sure if it is painful.  He says that he also feels like the plantar aspect of his foot is swollen.  He says that he history of wound on his right foot that quickly progressed antibiotic needing a TMA.  He denies any fevers, chills, nausea, vomiting, myalgias, arthralgias.  He states that recently his blood glucose has been poorly controlled because he is using a new taylor and is getting used to it.  He called his podiatrist today who advised he be seen in the emergency department.        Prior to Admission Medications   Prescriptions Last Dose Informant Patient Reported? Taking?   Accu-Chek Guide test strip   No No   Sig: Use as instructed tid   Continuous Blood Gluc Sensor (FreeStyle Taylor 3 Sensor) MISC   Yes No   Sig: Use 1 each every 14 (fourteen) days   Easy Comfort Pen Needles 33G X 5 MM MISC   No No   Sig: Inject under the skin 4 (four) times a day   Eliquis 5 MG   No No   Sig: Take 1 tablet (5 mg total) by mouth 2 (two) times a day   Insulin Glargine Solostar 100 UNIT/ML SOPN   No No   Sig: INJECT 0.4 ML (40 UNITS TOTAL) UNDER THE SKIN DAILY AT BEDTIME   Lancets (onetouch ultrasoft) lancets  Self No No   Sig: Use as instructed   Lancets (onetouch ultrasoft) lancets   No No   Sig: Use daily   acetaminophen (Mapap Arthritis Pain) 650 mg CR tablet  Self No No   Sig: Take 1 tablet (650 mg total) by mouth 2 (two) times a day   atorvastatin (LIPITOR) 40 mg tablet   No No   Sig: Take 1 tablet (40 mg total) by mouth daily  "  chlorthalidone 25 mg tablet   Yes No   diltiazem (CARDIZEM) 120 MG tablet   No No   Sig: Take 1 tablet (120 mg total) by mouth 2 (two) times a day   insulin lispro (HumaLOG) 100 units/mL injection pen   No No   Sig: Inject 8 Units under the skin 3 (three) times a day with meals   lisinopril (ZESTRIL) 40 mg tablet   No No   Sig: Take 1 tablet (40 mg total) by mouth daily   metFORMIN (GLUCOPHAGE) 500 mg tablet   No No   Sig: Take 1 tablet (500 mg total) by mouth 2 (two) times a day with meals   metoprolol succinate (TOPROL-XL) 100 mg 24 hr tablet   No No   Sig: TAKE 1 TABLET (100 MG TOTAL) BY MOUTH 2 (TWO) TIMES A DAY   tamsulosin (FLOMAX) 0.4 mg   No No   Sig: Take 1 capsule (0.4 mg total) by mouth daily with dinner      Facility-Administered Medications: None       Past Medical History:   Diagnosis Date    Arthritis     Atrial fibrillation (HCC)     Diagnosed 11/2018    Benign prostate hyperplasia 04/2002    Cellulitis     right lower leg     Colon polyp 2006    Diabetes mellitus (HCC)     Hearing aid worn     bilat    Hearing loss     90% loss left ear and 40% right ear    History of clubfoot     \"since birth\"    History of pneumonia     History of TIA (transient ischemic attack) 04/25/2017 11/30/18 pt denies    Hyperlipidemia 5/15/2014    Hypertension     Infectious viral hepatitis     \"cant remember type\"    Irregular heart beat     Afib    Neuropathy     both feet    Osteomyelitis (HCC)     right great toe    Pneumonia     Right middle lobe pulmonary nodule 11/6/2018    Seasickness     Teeth missing     Type 2 diabetes mellitus with diabetic neuropathy (HCC) 11/6/2018    Wears glasses     reading    Wound, open     bottom of right foot       Past Surgical History:   Procedure Laterality Date    BUNIONECTOMY Right 12/4/2018    Procedure: 5TH METATARSAL BONE PARTIAL RESECTION, FULL THICKNESS DEBRIDEMENT OF DIABETIC ULCER;  Surgeon: Shala Flynn DPM;  [facility];  Service: Podiatry    CLOSURE DELAYED " PRIMARY Left 10/30/2023    Procedure: CLOSURE DELAYED PRIMARY left foot;  Surgeon: Minh Urena DPM;  [facility];  Service: Podiatry    CLUB FOOT RELEASE Bilateral     COLONOSCOPY      COMPLEX WOUND CLOSURE TO EXTREMITY  4/27/2021    Procedure: COMPLEX WOUND CLOSURE TO EXTREMITY: RIGHT FOOT;  Surgeon: Niraj Bojorquez MD;  [facility];  Service: Plastics    EXTERNAL FIXATOR APPLICATION Right 2/12/2021    Procedure: Application multiplane external fixation;  Surgeon: Harry Zamudio DPM;  [facility];  Service: Podiatry    FOOT HARDWARE REMOVAL Right 2/17/2021    Procedure: REMOVAL EXTERNAL FIXATOR;  Surgeon: Harry Zamudio DPM;  [facility];  Service: Podiatry    FOREIGN BODY REMOVAL Right 4/27/2021    Procedure: REMOVAL FOREIGN BODY EXTREMITY: RIGHT FOOT;  Surgeon: Niraj Bojorquez MD;  [facility];  Service: Plastics    INCISION AND DRAINAGE OF WOUND Right 2/17/2021    Procedure: INCISION AND DRAINAGE (I&D) EXTREMITY;  Surgeon: Harry Zamudio DPM;  [facility];  Service: Podiatry    INCISION AND DRAINAGE OF WOUND Left 10/26/2023    Procedure: INCISION AND DRAINAGE (I&D) EXTREMITY; WASHOUT; REMOVAL OF FOREIGN BODY;  Surgeon: Satnam Ponce DPM;  [facility];  Service: Podiatry    ORIF FOOT FRACTURE Right 3/4/2021    Procedure: VAC PLACEMENT; SCREW FIXATION RIGHT FOOT FUSION;  Surgeon: Harry Zamudio DPM;  [facility];  Service: Podiatry    CT AMPUTATION FOOT TRANSMETARSAL Right 2/22/2021    Procedure: AMPUTATION TRANSMETATARSAL (TMA), REMOVAL NAIL IM T2 ICF HARDWARE;  Surgeon: Harry Zamudio DPM;  [facility];  Service: Podiatry    CT AMPUTATION METATARSAL W/TOE SINGLE Left 11/29/2023    Procedure: LEFT FOURTH RAY RESECTION FOOT;  Surgeon: Harry Zamudio DPM;  [facility];  Service: Podiatry    CT ARTHRD MIDTARSL/TARS MLT/TRANSVRS W/OSTEOT Right 2/12/2021    Procedure: foot ARTHRODESIS/FUSION;   Surgeon: Harry Zamudio DPM;  [facility];  Service: Podiatry    WA DEBRIDEMENT BONE 1ST 20 SQ CM/< Right 12/2/2021    Procedure: DEBRIDEMENT OF TARSAL BONES WITH ANITBIODIC BEADS;  Surgeon: Harry Zamudio DPM;  [facility];  Service: Podiatry    WA LNGTH/SHRT TENDON LEG/ANKLE 1 TENDON SPX Right 2/12/2021    Procedure: LENGTHEN TIBIAL TENDON, TRANS HEEL CORD;  Surgeon: Harry Zamudio DPM;  [facility];  Service: Podiatry    WA MUSC MYOCUTANEOUS/FASCIOCUTANEOUS FLAP TRUNK Right 4/12/2021    Procedure: RECONSTRUCTION MICROSURGICAL W/ FREE FLAP;  Surgeon: Niraj Bojorquez MD;  [facility];  Service: Plastics    WA MUSC MYOCUTANEOUS/FASCIOCUTANEOUS FLAP TRUNK Right 4/12/2021    Procedure: TAKEBACK RECONSTRUCTION MICROSURGICAL W/ FREE FLAP;  Surgeon: Niraj Bojorquez MD;  [facility];  Service: Plastics    WA MUSC MYOCUTANEOUS/FASCIOCUTANEOUS FLAP TRUNK Right 4/13/2021    Procedure: RECONSTRUCTION MICROSURGICAL W/ FREE FLAP, RE EXPLORATION, MICRO VASCULAR REVISION;  Surgeon: Niraj Bojorquez MD;  [facility];  Service: Plastics    WA OSTC PRTL EXOSTC/CONDYLC METAR HEAD Right 12/29/2020    Procedure: EXCISION EXOSTOSIS;  Surgeon: Harry Zamudio DPM;  [facility];  Service: Podiatry    WA SECONDARY CLOSURE SURG WOUND/DEHSN XTNSV/COMP Right 12/29/2020    Procedure: PRIMARY DELAYED CLOSURE;  Surgeon: Harry Zamudio DPM;  [facility];  Service: Podiatry    TOE AMPUTATION Right     partial great toe    TONSILLECTOMY      VAC DRESSING APPLICATION Right 3/1/2021    Procedure: APPLICATION VAC DRESSING EXTREMITY;  Surgeon: Niraj Bojorquez MD;  [facility];  Service: Plastics    VASECTOMY  1992    WOUND DEBRIDEMENT Right 3/1/2021    Procedure: DEBRIDEMENT LOWER EXTREMITY (WASH OUT);  Surgeon: Niraj Bojorquez MD;  [facility];  Service: Plastics    WOUND DEBRIDEMENT Right 4/7/2021     Procedure: DEBRIDEMENT RIGHT FOOT;  Surgeon: Niraj Bojorquez MD;  [facility];  Service: Plastics    WOUND DEBRIDEMENT Right 2021    Procedure: DEBRIDEMENT LOWER EXTREMITY (WASH OUT);  Surgeon: Niraj Bojorquez MD;  [facility];  Service: Plastics    WOUND DEBRIDEMENT Right 2021    Procedure: DEBRIDEMENT LOWER EXTREMITY (WASH OUT): RIGHT FOOT;  Surgeon: Niraj Bojorquez MD;  [facility];  Service: Plastics       Family History   Problem Relation Age of Onset    Diabetes Father         mellitus     I have reviewed and agree with the history as documented.    E-Cigarette/Vaping    E-Cigarette Use Never User      E-Cigarette/Vaping Substances    Nicotine No     THC No     CBD No     Flavoring No     Other No     Unknown No      Social History     Tobacco Use    Smoking status: Former     Current packs/day: 0.00     Types: Cigarettes     Quit date: 1988     Years since quittin.5    Smokeless tobacco: Former    Tobacco comments:     exposure to passive smoke   Vaping Use    Vaping status: Never Used   Substance Use Topics    Alcohol use: Yes     Comment: occasionally    Drug use: Not Currently        Review of Systems   Constitutional:  Negative for chills and fever.   HENT:  Negative for congestion, rhinorrhea and sore throat.    Eyes:  Negative for pain and redness.   Respiratory:  Negative for cough and shortness of breath.    Cardiovascular:  Negative for chest pain and palpitations.   Gastrointestinal:  Negative for abdominal pain, constipation, diarrhea, nausea and vomiting.   Genitourinary:  Negative for dysuria and hematuria.   Musculoskeletal:  Negative for back pain and neck pain.   Skin:  Positive for wound. Negative for pallor and rash.   Neurological:  Negative for weakness and numbness.   All other systems reviewed and are negative.      Physical Exam  ED Triage Vitals [24 1231]   Temperature Pulse Respirations Blood Pressure SpO2    97.9 °F (36.6 °C) 85 16 148/82 100 %      Temp Source Heart Rate Source Patient Position - Orthostatic VS BP Location FiO2 (%)   Temporal Monitor Sitting Right arm --      Pain Score       --             Orthostatic Vital Signs  Vitals:    05/13/24 1231   BP: 148/82   Pulse: 85   Patient Position - Orthostatic VS: Sitting       Physical Exam  Vitals and nursing note reviewed.   Constitutional:       General: He is not in acute distress.     Appearance: Normal appearance. He is well-developed and normal weight. He is not ill-appearing, toxic-appearing or diaphoretic.   HENT:      Head: Normocephalic and atraumatic.      Right Ear: External ear normal.      Left Ear: External ear normal.      Nose: Nose normal. No congestion or rhinorrhea.      Mouth/Throat:      Mouth: Mucous membranes are moist.      Pharynx: Oropharynx is clear.   Eyes:      General: No scleral icterus.        Right eye: No discharge.         Left eye: No discharge.      Extraocular Movements: Extraocular movements intact.      Conjunctiva/sclera: Conjunctivae normal.      Pupils: Pupils are equal, round, and reactive to light.   Cardiovascular:      Rate and Rhythm: Normal rate and regular rhythm.      Pulses: Normal pulses.      Heart sounds: Normal heart sounds. No murmur heard.     No friction rub. No gallop.   Pulmonary:      Effort: Pulmonary effort is normal. No respiratory distress.      Breath sounds: Normal breath sounds. No stridor. No wheezing, rhonchi or rales.   Abdominal:      General: Abdomen is flat. Bowel sounds are normal.      Palpations: Abdomen is soft.   Musculoskeletal:         General: No tenderness.      Cervical back: Normal range of motion and neck supple. No rigidity or tenderness.      Right lower leg: No edema.      Left lower leg: No edema.   Skin:     General: Skin is warm and dry.      Capillary Refill: Capillary refill takes less than 2 seconds.      Coloration: Skin is not jaundiced or pale.      Comments:  There is a superficial ulcer on the dorsal left third toe.  There is some slight purulence from around the cuticle of the nail.  There is no overt swelling, induration, or fluctuance.   Neurological:      General: No focal deficit present.      Mental Status: He is alert and oriented to person, place, and time.   Psychiatric:         Mood and Affect: Mood normal.         Behavior: Behavior normal.         ED Medications  Medications - No data to display    Diagnostic Studies  Results Reviewed       Procedure Component Value Units Date/Time    Sedimentation rate, automated [[id number]]  (Normal) Collected: 05/13/24 1327    Lab Status: Final result Specimen: Blood from Arm, Right Updated: 05/13/24 1455     Sed Rate 14 mm/hour     C-reactive protein [[id number]]  (Normal) Collected: 05/13/24 1327    Lab Status: Final result Specimen: Blood from Arm, Right Updated: 05/13/24 1437     CRP 1.7 mg/L     Comprehensive metabolic panel [[id number]] Collected: 05/13/24 1327    Lab Status: Final result Specimen: Blood from Arm, Right Updated: 05/13/24 1358     Sodium 137 mmol/L      Potassium 4.0 mmol/L      Chloride 104 mmol/L      CO2 27 mmol/L      ANION GAP 6 mmol/L      BUN 25 mg/dL      Creatinine 1.04 mg/dL      Glucose 133 mg/dL      Calcium 9.6 mg/dL      AST 13 U/L      ALT 15 U/L      Alkaline Phosphatase 62 U/L      Total Protein 6.8 g/dL      Albumin 4.1 g/dL      Total Bilirubin 0.57 mg/dL      eGFR 71 ml/min/1.73sq m     Narrative:      National Kid[employer] Foundation guidelines for Chronic Kidney Disease (CKD):     Stage 1 with normal or high GFR (GFR > 90 mL/min/1.73 square meters)    Stage 2 Mild CKD (GFR = 60-89 mL/min/1.73 square meters)    Stage 3A Moderate CKD (GFR = 45-59 mL/min/1.73 square meters)    Stage 3B Moderate CKD (GFR = 30-44 mL/min/1.73 square meters)    Stage 4 Severe CKD (GFR = 15-29 mL/min/1.73 square meters)    Stage 5 End Stage CKD (GFR <15 mL/min/1.73 square meters)  Note: GFR calculation  is accurate only with a steady state creatinine    CBC and differential [[id number]] Collected: 05/13/24 1327    Lab Status: Final result Specimen: Blood from Arm, Right Updated: 05/13/24 1339     WBC 8.09 Thousand/uL      RBC 4.55 Million/uL      Hemoglobin 14.4 g/dL      Hematocrit 42.8 %      MCV 94 fL      MCH 31.6 pg      MCHC 33.6 g/dL      RDW 13.2 %      MPV 11.1 fL      Platelets 216 Thousands/uL      nRBC 0 /100 WBCs      Segmented % 60 %      Immature Grans % 0 %      Lymphocytes % 29 %      Monocytes % 7 %      Eosinophils Relative 3 %      Basophils Relative 1 %      Absolute Neutrophils 4.89 Thousands/µL      Absolute Immature Grans 0.02 Thousand/uL      Absolute Lymphocytes 2.32 Thousands/µL      Absolute Monocytes 0.58 Thousand/µL      Eosinophils Absolute 0.24 Thousand/µL      Basophils Absolute 0.04 Thousands/µL                    XR foot 3+ views LEFT   ED Interpretation by Thomas Chavez MD (05/13 2441)   No acute osseous abnormalities      US bedside procedure   Final Result by Aly Mckeon (05/13 3944)            Procedures  US Guided Peripheral IV    Date/Time: 5/13/2024 1:46 PM    Performed by: Thomas Chavez MD  Authorized by: Thomas Chavez MD    Patient location:  ED  Performed by:  Resident  Indications:     Indications: difficulty obtaining IV access      Image availability:  Images available in PACS  Procedure details:     Location:  Right arm    Catheter size:  18 gauge    Number of attempts:  1    Successful placement: yes    Post-procedure details:     Post-procedure:  Dressing applied    Assessment: free fluid flow and no signs of infiltration      Post-procedure complications: none      Patient tolerance of procedure:  Tolerated well, no immediate complications        ED Course                                       Medical Decision Making  Patient is a 74-year-old male with history of diabetes, atrial fibrillation, history of osteomyelitis status post TMA of  the right foot who presents for wound check.    DDx includes but not limited to diabetic foot ulcer, abscess, osteomyelitis.  Will plan to obtain CBC, CMP, inflammatory markers, x-ray of the foot.  Will plan to discuss case with podiatry for further recommendations.    Patient's labs are gross unremarkable.  X-ray shows no air to indicate gas producing infection.  I placed an IV ultrasound line, please see my procedure note.  I discussed the case with podiatry who state the patient is safe for discharge with podiatry follow-up.  Advised Keflex and surgical shoe for home.  Patient expressed understanding of this.  Return precautions given, all questions answered.    Amount and/or Complexity of Data Reviewed  Labs: ordered.  Radiology: ordered and independent interpretation performed.          Disposition  Final diagnoses:   Diabetic foot ulcer (HCC)     Time reflects when diagnosis was documented in both MDM as applicable and the Disposition within this note       Time User Action Codes Description Comment    5/13/2024  2:56 PM Bradly Fitzpatrick Add [S91.105A] Open wound of fourth toe of left foot, initial encounter     5/13/2024  3:04 PM Thomas Chavez Add [E11.621,  L97.509] Diabetic foot ulcer (HCC)           ED Disposition       ED Disposition   Discharge    Condition   Stable    Date/Time   Mon May 13, 2024  3:04 PM    Comment   Won Gaitan Jr. discharge to home/self care.                   Follow-up Information       Follow up With Specialties Details Why Contact Info Additional Information    Samuel Ornelas MD Family Medicine Call  As needed [address] [place] [id number] [phone]       [facility] Emergency Department Emergency Medicine Go to  If symptoms worsen or if you have any other specific concerns [address] [facility] [id number] [phone] [facility] Emergency Department, [address], [place], [address]    [phone]            Discharge Medication List as of 5/13/2024  3:04 PM        START taking these medications    Details   cephalexin (KEFLEX) 500 mg capsule Take 1 capsule (500 mg total) by mouth every 6 (six) hours for 7 days, Starting Mon 5/13/2024, Until Mon 5/20/2024, Normal      mupirocin (BACTROBAN) 2 % ointment Apply topically 3 (three) times a day, Starting Mon 5/13/2024, Normal           CONTINUE these medications which have NOT CHANGED    Details   Accu-Chek Guide test strip Use as instructed tid, Normal      acetaminophen (Mapap Arthritis Pain) 650 mg CR tablet Take 1 tablet (650 mg total) by mouth 2 (two) times a day, Starting Tue 5/18/2021, Normal      atorvastatin (LIPITOR) 40 mg tablet Take 1 tablet (40 mg total) by mouth daily, Starting Wed 1/10/2024, Normal      chlorthalidone 25 mg tablet Historical Med      Continuous Blood Gluc Sensor (FreeStyle Taylor 3 Sensor) MISC Use 1 each every 14 (fourteen) days, Historical Med      diltiazem (CARDIZEM) 120 MG tablet Take 1 tablet (120 mg total) by mouth 2 (two) times a day, Starting Mon 4/22/2024, Until Sun 7/21/2024, Normal      Easy Comfort Pen Needles 33G X 5 MM MISC Inject under the skin 4 (four) times a day, Starting Fri 4/5/2024, Normal      Eliquis 5 MG Take 1 tablet (5 mg total) by mouth 2 (two) times a day, Starting Wed 1/10/2024, Normal      Insulin Glargine Solostar 100 UNIT/ML SOPN INJECT 0.4 ML (40 UNITS TOTAL) UNDER THE SKIN DAILY AT BEDTIME, Starting Fri 3/15/2024, Until Sun 4/14/2024, Normal      insulin lispro (HumaLOG) 100 units/mL injection pen Inject 8 Units under the skin 3 (three) times a day with meals, Starting Fri 4/5/2024, Until Fri 8/16/2024, Normal      !! Lancets (onetouch ultrasoft) lancets Use as instructed, Normal      !! Lancets (onetouch ultrasoft) lancets Use daily, Starting Mon 7/31/2023, Normal      lisinopril (ZESTRIL) 40 mg tablet Take 1 tablet (40 mg total) by mouth daily, Starting Wed 1/10/2024, Normal       metFORMIN (GLUCOPHAGE) 500 mg tablet Take 1 tablet (500 mg total) by mouth 2 (two) times a day with meals, Starting Wed 1/10/2024, Normal      metoprolol succinate (TOPROL-XL) 100 mg 24 hr tablet TAKE 1 TABLET (100 MG TOTAL) BY MOUTH 2 (TWO) TIMES A DAY, Starting Tue 5/7/2024, Until Sun 11/3/2024, Normal      tamsulosin (FLOMAX) 0.4 mg Take 1 capsule (0.4 mg total) by mouth daily with dinner, Starting Wed 1/10/2024, Normal       !! - Potential duplicate medications found. Please discuss with provider.        No discharge procedures on file.    PDMP Review       None             ED Provider  Attending physically available and evaluated Won Gaitan Jr.. I managed the patient along with the ED Attending.    Electronically Signed by           Thomas Chavez MD  05/13/24 2022

## 2024-05-13 NOTE — ED ATTENDING ATTESTATION
5/13/2024  I, Arturo Pierson DO, saw and evaluated the patient. I have discussed the patient with the resident/non-physician practitioner and agree with the resident's/non-physician practitioner's findings, Plan of Care, and MDM as documented in the resident's/non-physician practitioner's note, except where noted. All available labs and Radiology studies were reviewed.  I was present for key portions of any procedure(s) performed by the resident/non-physician practitioner and I was immediately available to provide assistance.       At this point I agree with the current assessment done in the Emergency Department.  I have conducted an independent evaluation of this patient a history and physical is as follows:    74-year-old male with diabetes insulin-dependent, severe neuropathy with TMA right foot 3 years ago now with ulceration of third toe left foot some purulence does not probe to bone no systemic symptoms.  He reports no pain but does not feel anything in that foot.  No trauma noted wife at bedside also provides history.  Notes that his bottom of his foot appears to be swollen.     Plan x-ray inflammatory markers consult podiatry as he was sent in by his podiatrist due to concern of previous infections requiring IV antibiotics, doubt osteomyelitis but still possible, likely just diabetic ulcer with some surrounding cellulitis    ED Course         Critical Care Time  Procedures

## 2024-05-13 NOTE — ED NOTES
Pt informed this RN he is typically a very hard stick.  This RN attempted to gain access however was unsuccessful.  Provider made aware at this time     Carmen Agudelo, RN  05/13/24 5611

## 2024-05-13 NOTE — TELEPHONE ENCOUNTER
Caller: Won Gaitan Jr    Doctor and/or Office: Dr. Ponce/Paz    #: [phone]    Escalation: Care/Left foot toe next to baby toe has spot on it/purple/feels it is infected-I have no appts to offer, but he would like a call back from  about this and antibiotic to hold him over until Dr. Ponce can examine him. Please call pt back and advise as diabetic and has lost digits before. Thanks

## 2024-05-15 ENCOUNTER — TELEPHONE (OUTPATIENT)
Dept: FAMILY MEDICINE CLINIC | Facility: CLINIC | Age: 74
End: 2024-05-15

## 2024-05-15 DIAGNOSIS — E11.40 TYPE 2 DIABETES MELLITUS WITH DIABETIC NEUROPATHY, WITHOUT LONG-TERM CURRENT USE OF INSULIN (HCC): Primary | ICD-10-CM

## 2024-05-15 NOTE — TELEPHONE ENCOUNTER
[facility]  Juan Manuel Villalobos PharmD     Reason for Outreach: Patient seen by clinical pharmacist on 4/29/2024.    First outreach attempt to patient for follow-up.    Patient unavailable at time of call. Left voicemail with contact information for return call. Also sent MeeVeet message. Will follow-up

## 2024-05-16 ENCOUNTER — OFFICE VISIT (OUTPATIENT)
Dept: PODIATRY | Facility: CLINIC | Age: 74
End: 2024-05-16
Payer: COMMERCIAL

## 2024-05-16 VITALS — WEIGHT: 237 LBS | BODY MASS INDEX: 28.86 KG/M2 | HEIGHT: 76 IN

## 2024-05-16 DIAGNOSIS — E11.40 TYPE 2 DIABETES MELLITUS WITH DIABETIC NEUROPATHY, WITHOUT LONG-TERM CURRENT USE OF INSULIN (HCC): ICD-10-CM

## 2024-05-16 DIAGNOSIS — L97.521 ULCER OF TOE OF LEFT FOOT, LIMITED TO BREAKDOWN OF SKIN (HCC): Primary | ICD-10-CM

## 2024-05-16 PROCEDURE — 99213 OFFICE O/P EST LOW 20 MIN: CPT | Performed by: PODIATRIST

## 2024-05-16 NOTE — PROGRESS NOTES
"   PATIENT:  Won Gaitan Jr.  1950      ASSESSMENT/PLAN:  1. Ulcer of toe of left foot, limited to breakdown of skin (HCC)  Post Op Shoe      2. Type 2 diabetes mellitus with diabetic neuropathy, without long-term current use of insulin (HCC)              1. Reviewed medical records.  Patient was counseled on the condition and diagnosis.  Educated disease prevention and risks related to diabetes.    2. Reviewed X-ray and blood work.  Wound is stable and healing.  Cellulitis resolved.  Continue local care and off-loading.    3. Finish abx as prescribed.  Resting and elevation.    4. RA in 2 weeks.          HPI:  Won Gaitan Jr. is a 74 y.o.year old male seen for wound check.  He was seen in ED on Monday with new ulcer on left 3rd toe.  He tolerates abx well.   No pain.  BS has been better.           PAST MEDICAL HISTORY:  Past Medical History:   Diagnosis Date    Arthritis     Atrial fibrillation (HCC)     Diagnosed 11/2018    Benign prostate hyperplasia 04/2002    Cellulitis     right lower leg     Colon polyp 2006    Diabetes mellitus (HCC)     Hearing aid worn     bilat    Hearing loss     90% loss left ear and 40% right ear    History of clubfoot     \"since birth\"    History of pneumonia     History of TIA (transient ischemic attack) 04/25/2017 11/30/18 pt denies    Hyperlipidemia 5/15/2014    Hypertension     Infectious viral hepatitis     \"cant remember type\"    Irregular heart beat     Afib    Neuropathy     both feet    Osteomyelitis (HCC)     right great toe    Pneumonia     Right middle lobe pulmonary nodule 11/6/2018    Seasickness     Teeth missing     Type 2 diabetes mellitus with diabetic neuropathy (HCC) 11/6/2018    Wears glasses     reading    Wound, open     bottom of right foot       PAST SURGICAL HISTORY:  Past Surgical History:   Procedure Laterality Date    BUNIONECTOMY Right 12/4/2018    Procedure: 5TH METATARSAL BONE PARTIAL RESECTION, FULL THICKNESS DEBRIDEMENT OF DIABETIC ULCER;  " Surgeon: Shala Flynn DPM;  [facility];  Service: Podiatry    CLOSURE DELAYED PRIMARY Left 10/30/2023    Procedure: CLOSURE DELAYED PRIMARY left foot;  Surgeon: Minh Urena DPM;  [facility];  Service: Podiatry    CLUB FOOT RELEASE Bilateral     COLONOSCOPY      COMPLEX WOUND CLOSURE TO EXTREMITY  4/27/2021    Procedure: COMPLEX WOUND CLOSURE TO EXTREMITY: RIGHT FOOT;  Surgeon: Niraj Bojorquez MD;  [facility];  Service: Plastics    EXTERNAL FIXATOR APPLICATION Right 2/12/2021    Procedure: Application multiplane external fixation;  Surgeon: Harry Zamudio DPM;  [facility];  Service: Podiatry    FOOT HARDWARE REMOVAL Right 2/17/2021    Procedure: REMOVAL EXTERNAL FIXATOR;  Surgeon: Harry Zamudio DPM;  [facility];  Service: Podiatry    FOREIGN BODY REMOVAL Right 4/27/2021    Procedure: REMOVAL FOREIGN BODY EXTREMITY: RIGHT FOOT;  Surgeon: Niraj Bojorquez MD;  [facility];  Service: Plastics    INCISION AND DRAINAGE OF WOUND Right 2/17/2021    Procedure: INCISION AND DRAINAGE (I&D) EXTREMITY;  Surgeon: Harry Zamudio DPM;  [facility];  Service: Podiatry    INCISION AND DRAINAGE OF WOUND Left 10/26/2023    Procedure: INCISION AND DRAINAGE (I&D) EXTREMITY; WASHOUT; REMOVAL OF FOREIGN BODY;  Surgeon: Satnam Ponce DPM;  [facility];  Service: Podiatry    ORIF FOOT FRACTURE Right 3/4/2021    Procedure: VAC PLACEMENT; SCREW FIXATION RIGHT FOOT FUSION;  Surgeon: Harry Zamudio DPM;  [facility];  Service: Podiatry    NH AMPUTATION FOOT TRANSMETARSAL Right 2/22/2021    Procedure: AMPUTATION TRANSMETATARSAL (TMA), REMOVAL NAIL IM T2 ICF HARDWARE;  Surgeon: Harry Zamudio DPM;  [facility];  Service: Podiatry    NH AMPUTATION METATARSAL W/TOE SINGLE Left 11/29/2023    Procedure: LEFT FOURTH RAY RESECTION FOOT;  Surgeon: Harry Zamudio DPM;  [facility];  Service: Podiatry    NH ARTHRD  MIDTARSL/TARS MLT/TRANSVRS W/OSTEOT Right 2/12/2021    Procedure: foot ARTHRODESIS/FUSION;  Surgeon: Harry Zamudio DPM;  [facility];  Service: Podiatry    ID DEBRIDEMENT BONE 1ST 20 SQ CM/< Right 12/2/2021    Procedure: DEBRIDEMENT OF TARSAL BONES WITH ANITBIODIC BEADS;  Surgeon: Harry Zamudio DPM;  [facility];  Service: Podiatry    ID LNGTH/SHRT TENDON LEG/ANKLE 1 TENDON SPX Right 2/12/2021    Procedure: LENGTHEN TIBIAL TENDON, TRANS HEEL CORD;  Surgeon: Harry Zamudio DPM;  [facility];  Service: Podiatry    ID MUSC MYOCUTANEOUS/FASCIOCUTANEOUS FLAP TRUNK Right 4/12/2021    Procedure: RECONSTRUCTION MICROSURGICAL W/ FREE FLAP;  Surgeon: Niraj Bojorquez MD;  [facility];  Service: Plastics    ID MUSC MYOCUTANEOUS/FASCIOCUTANEOUS FLAP TRUNK Right 4/12/2021    Procedure: TAKEBACK RECONSTRUCTION MICROSURGICAL W/ FREE FLAP;  Surgeon: Niraj Bojorquez MD;  [facility];  Service: Plastics    ID MUSC MYOCUTANEOUS/FASCIOCUTANEOUS FLAP TRUNK Right 4/13/2021    Procedure: RECONSTRUCTION MICROSURGICAL W/ FREE FLAP, RE EXPLORATION, MICRO VASCULAR REVISION;  Surgeon: Niraj Bojorquez MD;  [facility];  Service: Plastics    ID OSTC PRTL EXOSTC/CONDYLC METAR HEAD Right 12/29/2020    Procedure: EXCISION EXOSTOSIS;  Surgeon: Harry Zamudio DPM;  [facility];  Service: Podiatry    ID SECONDARY CLOSURE SURG WOUND/DEHSN XTNSV/COMP Right 12/29/2020    Procedure: PRIMARY DELAYED CLOSURE;  Surgeon: Harry Zamudio DPM;  [facility];  Service: Podiatry    TOE AMPUTATION Right     partial great toe    TONSILLECTOMY      VAC DRESSING APPLICATION Right 3/1/2021    Procedure: APPLICATION VAC DRESSING EXTREMITY;  Surgeon: Niraj Bojorquez MD;  [facility];  Service: Plastics    VASECTOMY  1992    WOUND DEBRIDEMENT Right 3/1/2021    Procedure: DEBRIDEMENT LOWER EXTREMITY (WASH OUT);  Surgeon: Niraj Bojorquez,  MD;  [facility];  Service: Plastics    WOUND DEBRIDEMENT Right 4/7/2021    Procedure: DEBRIDEMENT RIGHT FOOT;  Surgeon: Niraj Bojorquez MD;  [facility];  Service: Plastics    WOUND DEBRIDEMENT Right 4/12/2021    Procedure: DEBRIDEMENT LOWER EXTREMITY (WASH OUT);  Surgeon: Niraj Bojorquez MD;  [facility];  Service: Plastics    WOUND DEBRIDEMENT Right 4/27/2021    Procedure: DEBRIDEMENT LOWER EXTREMITY (WASH OUT): RIGHT FOOT;  Surgeon: Niraj Bojorquez MD;  [facility];  Service: Plastics        ALLERGIES:  Simvastatin and Itraconazole    MEDICATIONS:  Current Outpatient Medications   Medication Sig Dispense Refill    Accu-Chek Guide test strip Use as instructed tid 200 strip 12    acetaminophen (Mapap Arthritis Pain) 650 mg CR tablet Take 1 tablet (650 mg total) by mouth 2 (two) times a day 60 tablet 1    atorvastatin (LIPITOR) 40 mg tablet Take 1 tablet (40 mg total) by mouth daily 90 tablet 3    cephalexin (KEFLEX) 500 mg capsule Take 1 capsule (500 mg total) by mouth every 6 (six) hours for 7 days 28 capsule 0    chlorthalidone 25 mg tablet       Continuous Blood Gluc Sensor (FreeStyle Taylor 3 Sensor) MISC Use 1 each every 14 (fourteen) days      diltiazem (CARDIZEM) 120 MG tablet Take 1 tablet (120 mg total) by mouth 2 (two) times a day 180 tablet 0    Easy Comfort Pen Needles 33G X 5 MM MISC Inject under the skin 4 (four) times a day 100 each 11    Eliquis 5 MG Take 1 tablet (5 mg total) by mouth 2 (two) times a day 60 tablet 11    insulin lispro (HumaLOG) 100 units/mL injection pen Inject 8 Units under the skin 3 (three) times a day with meals 6 mL 3    Lancets (onetouch ultrasoft) lancets Use as instructed 100 each 3    Lancets (onetouch ultrasoft) lancets Use daily 100 each 3    lisinopril (ZESTRIL) 40 mg tablet Take 1 tablet (40 mg total) by mouth daily 90 tablet 3    metFORMIN (GLUCOPHAGE) 500 mg tablet Take 1 tablet (500 mg total) by mouth 2  (two) times a day with meals 180 tablet 5    metoprolol succinate (TOPROL-XL) 100 mg 24 hr tablet TAKE 1 TABLET (100 MG TOTAL) BY MOUTH 2 (TWO) TIMES A  tablet 1    mupirocin (BACTROBAN) 2 % ointment Apply topically 3 (three) times a day 15 g 0    tamsulosin (FLOMAX) 0.4 mg Take 1 capsule (0.4 mg total) by mouth daily with dinner 90 capsule 2    Insulin Glargine Solostar 100 UNIT/ML SOPN INJECT 0.4 ML (40 UNITS TOTAL) UNDER THE SKIN DAILY AT BEDTIME 12 mL 5     No current facility-administered medications for this visit.       SOCIAL HISTORY:  Social History     Socioeconomic History    Marital status: /Civil Union     Spouse name: None    Number of children: None    Years of education: None    Highest education level: None   Occupational History    None   Tobacco Use    Smoking status: Former     Current packs/day: 0.00     Types: Cigarettes     Quit date: 1988     Years since quittin.5    Smokeless tobacco: Former    Tobacco comments:     exposure to passive smoke   Vaping Use    Vaping status: Never Used   Substance and Sexual Activity    Alcohol use: Yes     Comment: occasionally    Drug use: Not Currently    Sexual activity: Yes     Partners: Female   Other Topics Concern    None   Social History Narrative    None     Social Determinants of Health     Financial Resource Strain: Not on file   Food Insecurity: No Food Insecurity (10/25/2023)    Hunger Vital Sign     Worried About Running Out of Food in the Last Year: Never true     Ran Out of Food in the Last Year: Never true   Transportation Needs: No Transportation Needs (10/25/2023)    PRAPARE - Transportation     Lack of Transportation (Medical): No     Lack of Transportation (Non-Medical): No   Physical Activity: Not on file   Stress: Not on file   Social Connections: Not on file   Intimate Partner Violence: Not on file   Housing Stability: Unknown (10/25/2023)    Housing Stability Vital Sign     Unable to Pay for Housing in the Last  "Year: No     Number of Places Lived in the Last Year: Not on file     Unstable Housing in the Last Year: No        REVIEW OF SYSTEMS:  GENERAL: No fever or chills  HEART: No chest pain, or palpitation  RESPIRATORY:  No acute SOB or cough  GI: No Nausea, vomit or diarrhea  NEUROLOGIC: No syncope or acute weakness    PHYSICAL EXAM:    Ht 6' 4\" (1.93 m)   Wt 108 kg (237 lb)   BMI 28.85 kg/m²     VASCULAR EXAM  Dorsalis pedis  absent, Posterior tibial artery  +1.  The patient has class findings with skin atrophy, lack of digital hair, and nail dystrophy.  There is +1 lower extremity edema bilaterally.  Venous stasis skin changes noted BLE.    NEUROLOGIC EXAM  Sensation is intact to light touch.  Sensation is absent to 10gm monofilament.  No focal neurologic deficit.          DERMATOLOGIC EXAM:   Redness resolved left 3rd toe.  Partial thickness ulcer on left 3rd toe at the tip.  Minimal drainage.  No purulence.  No deep probing.      MUSCULOSKELETAL EXAM:   No acute joint pain, edema, or redness.  No acute musculoskeletal problem.  Patient has deformity including hammertoes.  S/P left 4th ray amp and right TMA.          "

## 2024-05-20 ENCOUNTER — CLINICAL SUPPORT (OUTPATIENT)
Dept: FAMILY MEDICINE CLINIC | Facility: CLINIC | Age: 74
End: 2024-05-20

## 2024-05-20 DIAGNOSIS — E11.40 TYPE 2 DIABETES MELLITUS WITH DIABETIC NEUROPATHY, WITHOUT LONG-TERM CURRENT USE OF INSULIN (HCC): Primary | ICD-10-CM

## 2024-05-20 NOTE — PROGRESS NOTES
[facility] Pharm[facility]   Juan Manuel Villalobos, Ailyn     Won Gaitan Jr. is a 74 y.o. male who was referred to the clinical pharmacist by Samuel Ornelas MD for diabetes. Patient and wife present via telephone for follow-up.     This virtual check-in was done via telephone.   Encounter provider: John Kerns    Patient agrees to participate in a virtual check in via telephone or video visit instead of presenting to the office to address urgent/immediate medical needs.     After connecting, the patient was identified by name and date of birth.  Won Gaitan Jr. was informed that this was a telemedicine visit and that the exam was being conducted via the [employer] Teams platform. He agrees to proceed. Patient is located in the following state in which I hold an active license ([place]). My office door was closed. No one else was in the room.  Won Gaitan Jr. acknowledged consent and understanding of privacy and security of the telemedicine visit.  I informed the patient that I have reviewed his record in Epic and presented the opportunity for him to ask any questions regarding the visit today. The patient agreed to participate.      Plan:   The following actions were taken today by the Clinical Pharmacist:   1. Increase Lantus to 45 units SQ HS  2. Change Humalog to following scale  Glucose Levels Humalog (units)   < 150 0   151 - 180 3   181 - 200 4   201 - 250 6   251 - 300 9   301 - 350 12   351 - 400 15     3. Start Ozempic 0.25 mg SQ weekly x 4 weeks, then increase to 0.5 mg SQ weekly.   - May increase to 1 mg SQ weekly after 4 weeks on the 0.5 mg/week dose if needed to achieve glycemic goals; may increase further to 2 mg SQ  weekly after 4 weeks on the 1 mg/week dose if needed to achieve glycemic goals (maximum: 2 mg/week)  Orders placed under CPA    Patient Plan: Recommend provider please consider the following, if in agreeance  Make medication changes as above   Only take  Humalog BEFORE meals  HOME MONITORING: Check glucose levels with CGM and record    Follow-up:   Follow-up with pharmacist in 3-4 weeks  Next PCP visit: 7/12/2024    - Home Monitoring Records: CGM data  - Labs: A1c due on or after 7/3/2024    The following items are to be considered at a future visit:   1. Taper off Humalog  2. Maximize metformin    Chronic Conditions Addressed at this Visit:   Diabetes: Goals - A1c: <7-7.5%; Time In Range: >70% with TBR <4% and <1% of time with glucose <54 mg/dL: per ADA Guidelines.  - Most recent A1c: above goal.   - CGM TIR: below goal but TBR does meet goal with < 1% of time with glucose <54 mg/dL.  Current DM Regimen:  Humalog 8 units SQ AC  Lantus 40 units SQ HS (0.37 units/kg)  Metformin 500 mg BID  MEDICATIONS: Will increase Lantus to target global hyperglycemia and add GLP-1. Would like to minimize use of Humalog so will provide sliding scale.     Medication Reconciliation: Medication list reviewed with patient at today's visit and updated to reflect medications patient is currently taking.   - Reviewed and updated allergies    Subjective:     Patient with T2DM with current use of insulin. Since last visit, patient had ER visit for new ulcer of third toe of L foot, diagnosed with cellulitis, and treated with cephalexin. He has had follow-up with Podiatry since then.    Today, patient's wife reports wound is improving. No redness or pain. Very minimal drainage, no purulence.      Patient had been taking Humalog after meals. Spoke with patient on 5/15 and instructed him to take it AC. Some improvement in glucose levels seen. He also continues to take Lantus HS. No hypoglycemia.     DM History:  Microvascular complications:  nephropathy and peripheral neuropathy; S/P transmetatarsal amputation of R foot;  S/P amputation of L lesser toe   Cardiovascular complications: none  - Aspirin: No; Eliquis  - Statin: Yes   - ACEi/ARB: Yes    Previous DM medications:    Ogkeiraia  Glimepiride    CV Risk Factors:  - HF: No  - HTN: Yes - CKD: No   - Obesity: No; Overweight - Dyslipidemia: Yes - Albuminuria: Yes   - Smoking: Former      - Thyroid disorder: No  - History of pancreatitis: No    DM Self-Management:  - Self-monitoring: are performed regularly.  He checks glucose levels frequently. Patient is connected to clinic [employer] account.  - Hypoglycemic episodes: none  - History of Level 3/severe hypoglycemic event: never   - Hypoglycemia symptoms: N/A  - Treatment of hypoglycemia: discussed treatment  Glucometer: Yes, Brand: Accu-Check Guide Me   CGM: Accu-Check Guide Me , Brand: FreeStyle Taylor 3     Drug Therapy Problems:  - Medication Adherence: Responsible for medication management: patient and spouse. Medication adherence: taking as prescribed. Patient denies missed/extra doses.   - Medication Safety:  Side effects from medication(s) reported:  None    Preventative Care:  Eye Exam: Overdue; no DR in 2022  Foot Exam: Up to date; followed by Podiatry  Dental exam: Overdue  Vaccination: Immunizations not up to date per review of EHR (meningococcal, Hep A & B, zoster, RSV)    Objective:   CGM Data Review:  Device: FreeStyle Taylor 3  Dates: 5/7- 5/20/2024  Usage: 97%  Average glucose (mg/dL): 202  GMI (approx. lab A1c): 8.1%  Glycemic variability (CV): 26.3%    Glycemic patterns: Highs throughout the day and overnight   Hypoglycemia: none     Metric CGM Target   Time above range (TAR) Very High >250 mg/dL 16 <5%   Time above range (TAR) High >180 mg/dL 47 <25%   Time in range (TIR)  mg/dL 37 >70%   Time below range (TBR) Low <70 mg/dL 0 <4%    Time below range (TBR)Very Low <54 mg/dL 0 <1%     Current Outpatient Medications   Medication Instructions    Accu-Chek Guide test strip Use as instructed tid    acetaminophen (MAPAP ARTHRITIS PAIN) 650 mg, Oral, 2 times daily    atorvastatin (LIPITOR) 40 mg, Oral, Daily    cephalexin (KEFLEX) 500 mg, Oral, Every 6 hours scheduled     chlorthalidone 25 mg tablet     Continuous Blood Gluc Sensor (FreeStyle Taylor 3 Sensor) MISC 1 each, Does not apply, Every 14 days    diltiazem (CARDIZEM) 120 mg, Oral, 2 times daily    Easy Comfort Pen Needles 33G X 5 MM MISC Subcutaneous, 4 times daily    Eliquis 5 mg, Oral, 2 times daily    Insulin Glargine Solostar 40 Units, Subcutaneous, Daily at bedtime    insulin lispro (HUMALOG) 8 Units, Subcutaneous, 3 times daily with meals    Lancets (onetouch ultrasoft) lancets Use as instructed    Lancets (onetouch ultrasoft) lancets Other, Daily    lisinopril (ZESTRIL) 40 mg, Oral, Daily    metFORMIN (GLUCOPHAGE) 500 mg, Oral, 2 times daily with meals    metoprolol succinate (TOPROL-XL) 100 mg, Oral, 2 times daily    mupirocin (BACTROBAN) 2 % ointment Topical, 3 times daily    tamsulosin (FLOMAX) 0.4 mg, Oral, Daily with dinner     Allergies   Allergen Reactions    Simvastatin Myalgia    Itraconazole Other (See Comments)     Pt denies knowledge of allergy..      I have reviewed the patient's allergies, medications and history as noted in the electronic medical record and updated as necessary.    Pertinent Lab Data:  Lab Results   Component Value Date    SODIUM 137 05/13/2024    K 4.0 05/13/2024     05/13/2024    CO2 27 05/13/2024    BUN 25 05/13/2024    CREATININE 1.04 05/13/2024    GLUC 133 05/13/2024    CALCIUM 9.6 05/13/2024     Lab Results   Component Value Date    HGBA1C 9.1 (A) 04/05/2024      Pharmacist Tracking Tool:   Reason For Outreach: Embedded Pharmacist  Demographics:  Intervention Method: Phone  Type of Intervention: Follow-Up  Topics Addressed: Diabetes  Pharmacologic Interventions: Medication Initiation and Dose or Frequency Adjusted  Non-Pharmacologic Interventions: Cost, Medication/Device education, and Personal CGM  Time:  Direct Patient Care:  40  mins  Care Coordination:  15  mins  Recommendation Recipient: Patient/Caregiver  Outcome: Accepted    Juan Manuel Villalobos PharmD  Clinical  Integration Pharmacist  [facility]  [phone]

## 2024-06-04 ENCOUNTER — OFFICE VISIT (OUTPATIENT)
Dept: PODIATRY | Facility: CLINIC | Age: 74
End: 2024-06-04
Payer: COMMERCIAL

## 2024-06-04 VITALS
SYSTOLIC BLOOD PRESSURE: 115 MMHG | BODY MASS INDEX: 28.86 KG/M2 | WEIGHT: 237 LBS | HEIGHT: 76 IN | DIASTOLIC BLOOD PRESSURE: 66 MMHG | HEART RATE: 85 BPM

## 2024-06-04 DIAGNOSIS — Z89.431 S/P TRANSMETATARSAL AMPUTATION OF FOOT, RIGHT (HCC): ICD-10-CM

## 2024-06-04 DIAGNOSIS — L97.521 ULCER OF TOE OF LEFT FOOT, LIMITED TO BREAKDOWN OF SKIN (HCC): Primary | ICD-10-CM

## 2024-06-04 DIAGNOSIS — E11.40 TYPE 2 DIABETES MELLITUS WITH DIABETIC NEUROPATHY, WITHOUT LONG-TERM CURRENT USE OF INSULIN (HCC): ICD-10-CM

## 2024-06-04 PROCEDURE — 99213 OFFICE O/P EST LOW 20 MIN: CPT | Performed by: PODIATRIST

## 2024-06-04 NOTE — PROGRESS NOTES
"   PATIENT:  Won Gaitan Jr.  1950      ASSESSMENT/PLAN:  1. Ulcer of toe of left foot, limited to breakdown of skin ([facility])        2. Type 2 diabetes mellitus with diabetic neuropathy, without long-term current use of insulin ([facility])        3. S/P transmetatarsal amputation of foot, right ([facility])              1. Reviewed medical records.  Patient was counseled on the condition and diagnosis.  Educated disease prevention and risks related to diabetes.    2.  Wound looks healed left 3rd toe.  Protective dressing.  Foot exam bid at home.  New DM shoes evaluated.  Okay to slowly advance to DM shoes.    3. He will return in 4 weeks.        HPI:  Won Gaitan Jr. is a 74 y.o.year old male seen for wound check.  He tolerated abx well.   No pain.  Wound might be healed.  No drainage.  He has new diabetic shoes.  No new complaint.      PAST MEDICAL HISTORY:  Past Medical History:   Diagnosis Date    Arthritis     Atrial fibrillation ([facility])     Diagnosed 11/2018    Benign prostate hyperplasia 04/2002    Cellulitis     right lower leg     Colon polyp 2006    Diabetes mellitus (HCC)     Hearing aid worn     bilat    Hearing loss     90% loss left ear and 40% right ear    History of clubfoot     \"since birth\"    History of pneumonia     History of TIA (transient ischemic attack) 04/25/2017 11/30/18 pt denies    Hyperlipidemia 5/15/2014    Hypertension     Infectious viral hepatitis     \"cant remember type\"    Irregular heart beat     Afib    Neuropathy     both feet    Osteomyelitis (HCC)     right great toe    Pneumonia     Right middle lobe pulmonary nodule 11/6/2018    Seasickness     Teeth missing     Type 2 diabetes mellitus with diabetic neuropathy ([facility]) 11/6/2018    Wears glasses     reading    Wound, open     bottom of right foot       PAST SURGICAL HISTORY:  Past Surgical History:   Procedure Laterality Date    BUNIONECTOMY Right 12/4/2018    Procedure: 5TH METATARSAL BONE PARTIAL RESECTION, FULL THICKNESS " DEBRIDEMENT OF DIABETIC ULCER;  Surgeon: Shala Flynn DPM;  [facility];  Service: Podiatry    CLOSURE DELAYED PRIMARY Left 10/30/2023    Procedure: CLOSURE DELAYED PRIMARY left foot;  Surgeon: Minh Urena DPM;  [facility];  Service: Podiatry    CLUB FOOT RELEASE Bilateral     COLONOSCOPY      COMPLEX WOUND CLOSURE TO EXTREMITY  4/27/2021    Procedure: COMPLEX WOUND CLOSURE TO EXTREMITY: RIGHT FOOT;  Surgeon: Niraj Bojorquez MD;  [facility];  Service: Plastics    EXTERNAL FIXATOR APPLICATION Right 2/12/2021    Procedure: Application multiplane external fixation;  Surgeon: Harry Zamudio DPM;  [facility];  Service: Podiatry    FOOT HARDWARE REMOVAL Right 2/17/2021    Procedure: REMOVAL EXTERNAL FIXATOR;  Surgeon: Harry Zamudio DPM;  [facility];  Service: Podiatry    FOREIGN BODY REMOVAL Right 4/27/2021    Procedure: REMOVAL FOREIGN BODY EXTREMITY: RIGHT FOOT;  Surgeon: Niraj Bojorquez MD;  [facility];  Service: Plastics    INCISION AND DRAINAGE OF WOUND Right 2/17/2021    Procedure: INCISION AND DRAINAGE (I&D) EXTREMITY;  Surgeon: Harry Zamudio DPM;  [facility];  Service: Podiatry    INCISION AND DRAINAGE OF WOUND Left 10/26/2023    Procedure: INCISION AND DRAINAGE (I&D) EXTREMITY; WASHOUT; REMOVAL OF FOREIGN BODY;  Surgeon: Satnam Ponce DPM;  [facility];  Service: Podiatry    ORIF FOOT FRACTURE Right 3/4/2021    Procedure: VAC PLACEMENT; SCREW FIXATION RIGHT FOOT FUSION;  Surgeon: Harry Zamudio DPM;  [facility];  Service: Podiatry    AK AMPUTATION FOOT TRANSMETARSAL Right 2/22/2021    Procedure: AMPUTATION TRANSMETATARSAL (TMA), REMOVAL NAIL IM T2 ICF HARDWARE;  Surgeon: Harry Zamudio DPM;  [facility];  Service: Podiatry    AK AMPUTATION METATARSAL W/TOE SINGLE Left 11/29/2023    Procedure: LEFT FOURTH RAY RESECTION FOOT;  Surgeon: Harry Zamudio DPM;  [facility];  Service:  Podiatry    AK ARTHRD MIDTARSL/TARS MLT/TRANSVRS W/OSTEOT Right 2/12/2021    Procedure: foot ARTHRODESIS/FUSION;  Surgeon: Harry Zamudio DPM;  [facility];  Service: Podiatry    AK DEBRIDEMENT BONE 1ST 20 SQ CM/< Right 12/2/2021    Procedure: DEBRIDEMENT OF TARSAL BONES WITH ANITBIODIC BEADS;  Surgeon: Harry Zamudio DPM;  [facility];  Service: Podiatry    AK LNGTH/SHRT TENDON LEG/ANKLE 1 TENDON SPX Right 2/12/2021    Procedure: LENGTHEN TIBIAL TENDON, TRANS HEEL CORD;  Surgeon: Harry Zamudio DPM;  [facility];  Service: Podiatry    AK MUSC MYOCUTANEOUS/FASCIOCUTANEOUS FLAP TRUNK Right 4/12/2021    Procedure: RECONSTRUCTION MICROSURGICAL W/ FREE FLAP;  Surgeon: Niraj Bojorquez MD;  [facility];  Service: Plastics    AK MUSC MYOCUTANEOUS/FASCIOCUTANEOUS FLAP TRUNK Right 4/12/2021    Procedure: TAKEBACK RECONSTRUCTION MICROSURGICAL W/ FREE FLAP;  Surgeon: Niraj Bojorquez MD;  [facility];  Service: Plastics    AK MUSC MYOCUTANEOUS/FASCIOCUTANEOUS FLAP TRUNK Right 4/13/2021    Procedure: RECONSTRUCTION MICROSURGICAL W/ FREE FLAP, RE EXPLORATION, MICRO VASCULAR REVISION;  Surgeon: Niraj Bojorquez MD;  [facility];  Service: Plastics    AK OSTC PRTL EXOSTC/CONDYLC METAR HEAD Right 12/29/2020    Procedure: EXCISION EXOSTOSIS;  Surgeon: Harry Zamudio DPM;  [facility];  Service: Podiatry    AK SECONDARY CLOSURE SURG WOUND/DEHSN XTNSV/COMP Right 12/29/2020    Procedure: PRIMARY DELAYED CLOSURE;  Surgeon: Harry Zamudio DPM;  [facility];  Service: Podiatry    TOE AMPUTATION Right     partial great toe    TONSILLECTOMY      VAC DRESSING APPLICATION Right 3/1/2021    Procedure: APPLICATION VAC DRESSING EXTREMITY;  Surgeon: Niraj Bojorquez MD;  [facility];  Service: Plastics    VASECTOMY  1992    WOUND DEBRIDEMENT Right 3/1/2021    Procedure: DEBRIDEMENT LOWER EXTREMITY (WASH OUT);  Surgeon: Niraj  NARA Bojorquez MD;  [facility];  Service: Plastics    WOUND DEBRIDEMENT Right 4/7/2021    Procedure: DEBRIDEMENT RIGHT FOOT;  Surgeon: Niraj Bojorquez MD;  [facility];  Service: Plastics    WOUND DEBRIDEMENT Right 4/12/2021    Procedure: DEBRIDEMENT LOWER EXTREMITY (WASH OUT);  Surgeon: Niraj Bojorquez MD;  [facility];  Service: Plastics    WOUND DEBRIDEMENT Right 4/27/2021    Procedure: DEBRIDEMENT LOWER EXTREMITY (WASH OUT): RIGHT FOOT;  Surgeon: Niraj Bojorquez MD;  [facility];  Service: Plastics        ALLERGIES:  Simvastatin and Itraconazole    MEDICATIONS:  Current Outpatient Medications   Medication Sig Dispense Refill    Accu-Chek Guide test strip Use as instructed tid 200 strip 12    acetaminophen (Mapap Arthritis Pain) 650 mg CR tablet Take 1 tablet (650 mg total) by mouth 2 (two) times a day 60 tablet 1    atorvastatin (LIPITOR) 40 mg tablet Take 1 tablet (40 mg total) by mouth daily 90 tablet 3    chlorthalidone 25 mg tablet       Continuous Blood Gluc Sensor (FreeStyle Taylor 3 Sensor) MISC Use 1 each every 14 (fourteen) days      diltiazem (CARDIZEM) 120 MG tablet Take 1 tablet (120 mg total) by mouth 2 (two) times a day 180 tablet 0    Easy Comfort Pen Needles 33G X 5 MM MISC Inject under the skin 4 (four) times a day 100 each 11    Eliquis 5 MG Take 1 tablet (5 mg total) by mouth 2 (two) times a day 60 tablet 11    insulin lispro (HumaLOG) 100 units/mL injection pen Inject 8 Units under the skin 3 (three) times a day with meals 6 mL 3    Lancets (onetouch ultrasoft) lancets Use as instructed 100 each 3    Lancets (onetouch ultrasoft) lancets Use daily 100 each 3    lisinopril (ZESTRIL) 40 mg tablet Take 1 tablet (40 mg total) by mouth daily 90 tablet 3    metFORMIN (GLUCOPHAGE) 500 mg tablet Take 1 tablet (500 mg total) by mouth 2 (two) times a day with meals 180 tablet 5    metoprolol succinate (TOPROL-XL) 100 mg 24 hr tablet  TAKE 1 TABLET (100 MG TOTAL) BY MOUTH 2 (TWO) TIMES A  tablet 1    mupirocin (BACTROBAN) 2 % ointment Apply topically 3 (three) times a day 15 g 0    semaglutide, 0.25 or 0.5 mg/dose, (Ozempic, 0.25 or 0.5 MG/DOSE,) 2 mg/3 mL injection pen 0.25 mg under the skin every 7 days for 4 doses (28 days), THEN 0.5 mg under the skin every 7 days 3 mL 1    tamsulosin (FLOMAX) 0.4 mg Take 1 capsule (0.4 mg total) by mouth daily with dinner 90 capsule 2    Insulin Glargine Solostar 100 UNIT/ML SOPN INJECT 0.4 ML (40 UNITS TOTAL) UNDER THE SKIN DAILY AT BEDTIME 12 mL 5     No current facility-administered medications for this visit.       SOCIAL HISTORY:  Social History     Socioeconomic History    Marital status: /Civil Union     Spouse name: None    Number of children: None    Years of education: None    Highest education level: None   Occupational History    None   Tobacco Use    Smoking status: Former     Current packs/day: 0.00     Types: Cigarettes     Quit date: 1988     Years since quittin.5    Smokeless tobacco: Former    Tobacco comments:     exposure to passive smoke   Vaping Use    Vaping status: Never Used   Substance and Sexual Activity    Alcohol use: Yes     Comment: occasionally    Drug use: Not Currently    Sexual activity: Yes     Partners: Female   Other Topics Concern    None   Social History Narrative    None     Social Determinants of Health     Financial Resource Strain: Not on file   Food Insecurity: No Food Insecurity (10/25/2023)    Hunger Vital Sign     Worried About Running Out of Food in the Last Year: Never true     Ran Out of Food in the Last Year: Never true   Transportation Needs: No Transportation Needs (10/25/2023)    PRAPARE - Transportation     Lack of Transportation (Medical): No     Lack of Transportation (Non-Medical): No   Physical Activity: Not on file   Stress: Not on file   Social Connections: Not on file   Intimate Partner Violence: Not on file   Housing  "Stability: Unknown (10/25/2023)    Housing Stability Vital Sign     Unable to Pay for Housing in the Last Year: No     Number of Places Lived in the Last Year: Not on file     Unstable Housing in the Last Year: No        REVIEW OF SYSTEMS:  GENERAL: No fever or chills  HEART: No chest pain, or palpitation  RESPIRATORY:  No acute SOB or cough  GI: No Nausea, vomit or diarrhea  NEUROLOGIC: No syncope or acute weakness    PHYSICAL EXAM:    /66   Pulse 85   Ht 6' 4\" (1.93 m) Comment: verbal  Wt 108 kg (237 lb)   BMI 28.85 kg/m²     VASCULAR EXAM  Dorsalis pedis  absent, Posterior tibial artery  +1.  The patient has class findings with skin atrophy, lack of digital hair, and nail dystrophy.  There is +1 lower extremity edema bilaterally.  Venous stasis skin changes noted BLE.    NEUROLOGIC EXAM  Sensation is intact to light touch.  Sensation is absent to 10gm monofilament.  No focal neurologic deficit.          DERMATOLOGIC EXAM:   Wound looks healed left 3rd toe.  No redness or swelling.  No drainage.  No purulence.      MUSCULOSKELETAL EXAM:   No acute joint pain, edema, or redness.  No acute musculoskeletal problem.  Patient has deformity including hammertoes.  S/P left 4th ray amp and right TMA.          "

## 2024-06-07 ENCOUNTER — TELEPHONE (OUTPATIENT)
Dept: FAMILY MEDICINE CLINIC | Facility: CLINIC | Age: 74
End: 2024-06-07

## 2024-06-07 DIAGNOSIS — E11.40 TYPE 2 DIABETES MELLITUS WITH DIABETIC NEUROPATHY, WITHOUT LONG-TERM CURRENT USE OF INSULIN (HCC): Primary | ICD-10-CM

## 2024-06-07 NOTE — TELEPHONE ENCOUNTER
[facility] Clinical Integration Pharmacy Services   Juan Manuel Villalobos, Ailyn     Won Gaitan Jr. is a 74 y.o. male who was referred to the clinical pharmacist by Samuel Ornelas MD for diabetes. Patient presents via telephone for follow-up.     This virtual check-in was done via telephone.   Encounter provider: John Kerns    Patient agrees to participate in a virtual check in via telephone or video visit instead of presenting to the office to address urgent/immediate medical needs.     After connecting, the patient was identified by name and date of birth.  Won Gaitan Jr. was informed that this was a telemedicine visit and that the exam was being conducted via the [employer] Teams platform. He agrees to proceed. Patient is located in the following state in which I hold an active license ([place]). My office door was closed. No one else was in the room.  Won Gaitan Jr. acknowledged consent and understanding of privacy and security of the telemedicine visit.  I informed the patient that I have reviewed his record in Epic and presented the opportunity for him to ask any questions regarding the visit today. The patient agreed to participate.      Assessment/Plan:     Reviewed patient's AGP. Improvement noted in TIR with decrease in TAR, particularly very high glucose readings (>250 mg/dL).     Patient reports he is ready to start Ozempic. Confirms he has medication on hand. Provided brief review of dosing and proper administration. He reports he will begin this week.     Diabetes: Goals - A1c: <7-7.5%; Time In Range: >70% with TBR <4% and <1% of time with glucose <54 mg/dL: per ADA Guidelines.  - Most recent A1c: above goal.   - CGM TIR: below goal but TBR does meet goal  Current DM Regimen:  Humalog 9 units SQ AC  Lantus 45 units SQ HS (0.42 units/kg)  Metformin 500 mg BID    Medication Reconciliation: Medication list reviewed with patient at today's visit and updated to reflect medications patient is  currently taking.   - Reviewed and updated allergies    Follow-up:   Follow-up with pharmacist in 2 weeks  Next PCP visit: 7/12/2024    - Home Monitoring Records: CGM data  - Labs: A1c due on or after 7/3/2024    The following items are to be considered at a future visit:   1. Taper off Humalog  2. Maximize metformin    Subjective:     Patient with T2DM with current use of insulin. Currently taking Lantus 45 units SQ dailyPatient has not started using Ozempic. He wanted to finish current supply of Humalog. Also not using sliding scale. He has consistently been administering Humalog 9 units. Reports he has consistently been taking Humalog before meals and not after meals.     Objective:   CGM Data Review:  Device: FreeStyle Taylor 3  Dates: 5/25- 6/7/2024  Usage: 100%  Average glucose (mg/dL): 180  GMI (approx. lab A1c): 7.6%  Glycemic variability (CV): 23.5%    Glycemic patterns: Highs throughout the day and overnight   Hypoglycemia: none     Metric CGM Target   Time above range (TAR) Very High >250 mg/dL 5 <5%   Time above range (TAR) High >180 mg/dL 42 <25%   Time in range (TIR)  mg/dL 53 >70%   Time below range (TBR) Low <70 mg/dL 0 <4%    Time below range (TBR)Very Low <54 mg/dL 0 <1%     Pharmacist Tracking Tool:   Reason For Outreach: Embedded Pharmacist  Demographics:  Intervention Method: Phone  Type of Intervention: Follow-Up  Topics Addressed: Diabetes  Pharmacologic Interventions: Medication Initiation  Non-Pharmacologic Interventions: Personal CGM  Time:  Direct Patient Care:  25  mins  Care Coordination:  10  mins  Recommendation Recipient: Patient/Caregiver  Outcome: Accepted    Juan Manuel Villalobos PharmD  Clinical Integration Pharmacist  [facility]  [phone]

## 2024-06-08 DIAGNOSIS — I10 ESSENTIAL HYPERTENSION: Primary | ICD-10-CM

## 2024-06-10 RX ORDER — CHLORTHALIDONE 25 MG/1
TABLET ORAL
Qty: 90 TABLET | Refills: 0 | Status: SHIPPED | OUTPATIENT
Start: 2024-06-10

## 2024-06-24 ENCOUNTER — CLINICAL SUPPORT (OUTPATIENT)
Dept: FAMILY MEDICINE CLINIC | Facility: CLINIC | Age: 74
End: 2024-06-24

## 2024-06-24 DIAGNOSIS — E11.40 TYPE 2 DIABETES MELLITUS WITH DIABETIC NEUROPATHY, WITHOUT LONG-TERM CURRENT USE OF INSULIN (HCC): Primary | ICD-10-CM

## 2024-06-24 NOTE — PROGRESS NOTES
[facility] Clinical Integration Pharmacy Services   Juan Manuel Villalobos PharmD     Won Gaitan Jr. is a 74 y.o. male who was referred to the clinical pharmacist by Samuel Ornelas MD for diabetes. Patient presents via telephone for follow-up.     This virtual check-in was done via telephone.   Encounter provider: John Kerns    Patient agrees to participate in a virtual check in via telephone or video visit instead of presenting to the office to address urgent/immediate medical needs.     After connecting, the patient was identified by name and date of birth.  Won Gaitan Jr. was informed that this was a telemedicine visit and that the exam was being conducted via the Microsoft Teams platform. He agrees to proceed. Patient is located in the following state in which I hold an active license ([place] My office door was closed. No one else was in the room.  Won Gaitan JrSharif acknowledged consent and understanding of privacy and security of the telemedicine visit.  I informed the patient that I have reviewed his record in Epic and presented the opportunity for him to ask any questions regarding the visit today. The patient agreed to participate.      Plan:   The following actions were taken today by the Clinical Pharmacist:   1. Continue Ozempic, with titration to 0.5 mg SQ weekly on 7/7/2024  2. Decrease Humalog - Stop taking before lunch on 6/30/2024, Stop taking before breakfast on 7/7/2024    Follow-up:   Follow-up with pharmacist in 2 weeks  Next PCP visit: 7/12/2024    - Home Monitoring Records: CGM data  - Labs: A1c due on or after 7/3/2024    The following items are to be considered at a future visit:   1. Stop dinner dose on 7/14  2. Maximize metformin    Chronic Conditions Addressed at this Visit:   Diabetes: Health Status: Goals - A1c: <7-7.5%; Time In Range: >70% with TBR <4% and <1% of time with glucose <54 mg/dL: per ADA Guidelines.  - Most recent A1c: above goal.   - CGM TIR: just below goal and  continues to improve; TBR meets goal.  Current DM Regimen:  Humalog 9 units SQ AC  Lantus 40 units SQ HS (0.37 units/kg)  Metformin 500 mg BID  Ozempic 0.25 mg SQ weekly (Sundays)  MEDICATIONS: Will continue Ozempic. Decrease use of Humalog as Ozempic is titrated.     Medication Reconciliation: Medication list reviewed with patient at today's visit and updated to reflect medications patient is currently taking.   - Reviewed and updated allergies    Subjective:     Patient is now taking Ozempic 0.25 mg SQ weekly, first dose on 6/9/2024. Denies any GI side effects.       DM History:  Microvascular complications:  nephropathy and peripheral neuropathy; S/P transmetatarsal amputation of R foot;  S/P amputation of L lesser toe   Cardiovascular complications: none  - Aspirin: No; Eliquis  - Statin: Yes   - ACEi/ARB: Yes    Previous DM medications:   Januvia  Glimepiride    CV Risk Factors:  - HF: No  - HTN: Yes - CKD: No   - Obesity: No; Overweight - Dyslipidemia: Yes - Albuminuria: Yes   - Smoking: Former      - Thyroid disorder: No  - History of pancreatitis: No    DM Self-Management:  - Self-monitoring: are performed regularly.  He checks glucose levels very frequently using CGM. Patient is connected to clinic [employer] account.  - Hypoglycemic episodes: none - History of Level 3/severe hypoglycemic event: never   - Hypoglycemia symptoms: N/A - Treatment of hypoglycemia: discussed treatment   Glucometer: Yes, Brand: Accu-Check Guide Me   CGM: Accu-Check Guide Me , Brand: FreeStyle Taylor 3     Drug Therapy Problems:  - Medication Adherence: Responsible for medication management: patient and spouse. Medication adherence: taking as prescribed. Patient denies missed/extra doses.   - Medication Safety:  Side effects from medication(s) reported:  None    Preventative Care:  Eye Exam: Overdue  Foot Exam: Up to date; followed by Podiatry  Vaccination: Immunizations not up to date per review of EHR (meningococcal, Hep A & B,  zoster, RSV)    Objective:   CGM Data Review:  Device: FreeStyle Taylor 3  Dates: 6/11 - 6/24/2024  Usage: 98%  Average glucose (mg/dL): 167  GMI (approx. lab A1c): 7.3%  Glycemic variability (CV): 24.4%    Glycemic patterns: Well controlled throughout the day with hyperglycemia in the evening (6P-10P)  Hypoglycemia: none     Metric CGM Target   Time above range (TAR) Very High >250 mg/dL 4 <5%   Time above range (TAR) High >180 mg/dL 29 <25%   Time in range (TIR)  mg/dL 67 >70%   Time below range (TBR) Low <70 mg/dL 0 <4%    Time below range (TBR)Very Low <54 mg/dL 0 <1%     Current Outpatient Medications   Medication Instructions    Accu-Chek Guide test strip Use as instructed tid    acetaminophen (MAPAP ARTHRITIS PAIN) 650 mg, Oral, 2 times daily    atorvastatin (LIPITOR) 40 mg, Oral, Daily    chlorthalidone 25 mg tablet TAKE 1 TABLET (25 MG TOTAL) BY MOUTH DAILY    Continuous Blood Gluc Sensor (FreeStyle Taylor 3 Sensor) MISC 1 each, Does not apply, Every 14 days    diltiazem (CARDIZEM) 120 mg, Oral, 2 times daily    Easy Comfort Pen Needles 33G X 5 MM MISC Subcutaneous, 4 times daily    Eliquis 5 mg, Oral, 2 times daily    Insulin Glargine Solostar 40 Units, Subcutaneous, Daily at bedtime    insulin lispro (HUMALOG) 8 Units, Subcutaneous, 3 times daily with meals    Lancets (onetouch ultrasoft) lancets Use as instructed    Lancets (onetouch ultrasoft) lancets Other, Daily    lisinopril (ZESTRIL) 40 mg, Oral, Daily    metFORMIN (GLUCOPHAGE) 500 mg, Oral, 2 times daily with meals    metoprolol succinate (TOPROL-XL) 100 mg, Oral, 2 times daily    mupirocin (BACTROBAN) 2 % ointment Topical, 3 times daily    semaglutide, 0.25 or 0.5 mg/dose, (Ozempic, 0.25 or 0.5 MG/DOSE,) 2 mg/3 mL injection pen 0.25 mg under the skin every 7 days for 4 doses (28 days), THEN 0.5 mg under the skin every 7 days    tamsulosin (FLOMAX) 0.4 mg, Oral, Daily with dinner     Allergies   Allergen Reactions    Simvastatin Myalgia     Itraconazole Other (See Comments)     Pt denies knowledge of allergy..      I have reviewed the patient's allergies, medications and history as noted in the electronic medical record and updated as necessary.    Pertinent Lab Data:  Lab Results   Component Value Date    SODIUM 137 05/13/2024    K 4.0 05/13/2024     05/13/2024    CO2 27 05/13/2024    BUN 25 05/13/2024    CREATININE 1.04 05/13/2024    GLUC 133 05/13/2024    CALCIUM 9.6 05/13/2024     Lab Results   Component Value Date    HGBA1C 9.1 (A) 04/05/2024      Pharmacist Tracking Tool:   Reason For Outreach: Embedded Pharmacist  Demographics:  Intervention Method: Phone  Type of Intervention: Follow-Up  Topics Addressed: Diabetes  Pharmacologic Interventions: Dose or Frequency Adjusted  Non-Pharmacologic Interventions: Personal CGM  Time:  Direct Patient Care:  20  mins  Care Coordination:  15  mins  Recommendation Recipient: Patient/Caregiver  Outcome: Accepted    Juan Manuel Villalobos PharmD  Clinical Integration Pharmacist  [facility]  [phone]

## 2024-06-28 ENCOUNTER — ANTICOAG VISIT (OUTPATIENT)
Dept: FAMILY MEDICINE CLINIC | Facility: CLINIC | Age: 74
End: 2024-06-28

## 2024-07-09 ENCOUNTER — OFFICE VISIT (OUTPATIENT)
Dept: PODIATRY | Facility: CLINIC | Age: 74
End: 2024-07-09
Payer: COMMERCIAL

## 2024-07-09 VITALS
DIASTOLIC BLOOD PRESSURE: 77 MMHG | HEIGHT: 76 IN | HEART RATE: 87 BPM | BODY MASS INDEX: 28.86 KG/M2 | SYSTOLIC BLOOD PRESSURE: 114 MMHG | WEIGHT: 237 LBS

## 2024-07-09 DIAGNOSIS — E11.40 TYPE 2 DIABETES MELLITUS WITH DIABETIC NEUROPATHY, WITHOUT LONG-TERM CURRENT USE OF INSULIN (HCC): ICD-10-CM

## 2024-07-09 DIAGNOSIS — L97.521 ULCER OF TOE OF LEFT FOOT, LIMITED TO BREAKDOWN OF SKIN (HCC): Primary | ICD-10-CM

## 2024-07-09 DIAGNOSIS — Z89.431 S/P TRANSMETATARSAL AMPUTATION OF FOOT, RIGHT (HCC): ICD-10-CM

## 2024-07-09 PROCEDURE — 99213 OFFICE O/P EST LOW 20 MIN: CPT | Performed by: PODIATRIST

## 2024-07-09 NOTE — PROGRESS NOTES
"   PATIENT:  Won Gaitan Jr.  1950      ASSESSMENT/PLAN:  1. Ulcer of toe of left foot, limited to breakdown of skin (Formerly [facility])        2. Type 2 diabetes mellitus with diabetic neuropathy, without long-term current use of insulin (Formerly [facility])        3. S/P transmetatarsal amputation of foot, right (Formerly [facility])              1. Reviewed medical records.  Patient was counseled on the condition and diagnosis.  Educated disease prevention and risks related to diabetes.    2.  Wound remains healed left 3rd toe.  Foot exam bid at home.  Continue diabetic shoes.  Instructed skin care and protection.    3. He will return in 6 weeks.        HPI:  Won Gaitan Jr. is a 74 y.o.year old male seen for foot evaluation.  No drainage or redness.  No pain.  BS under control.   No new complaint.      PAST MEDICAL HISTORY:  Past Medical History:   Diagnosis Date    Arthritis     Atrial fibrillation (HCC)     Diagnosed 11/2018    Benign prostate hyperplasia 04/2002    Cellulitis     right lower leg     Colon polyp 2006    Diabetes mellitus (HCC)     Hearing aid worn     bilat    Hearing loss     90% loss left ear and 40% right ear    History of clubfoot     \"since birth\"    History of pneumonia     History of TIA (transient ischemic attack) 04/25/2017 11/30/18 pt denies    Hyperlipidemia 5/15/2014    Hypertension     Infectious viral hepatitis     \"cant remember type\"    Irregular heart beat     Afib    Neuropathy     both feet    Osteomyelitis (HCC)     right great toe    Pneumonia     Right middle lobe pulmonary nodule 11/6/2018    Seasickness     Teeth missing     Type 2 diabetes mellitus with diabetic neuropathy ([facility]) 11/6/2018    Wears glasses     reading    Wound, open     bottom of right foot       PAST SURGICAL HISTORY:  Past Surgical History:   Procedure Laterality Date    BUNIONECTOMY Right 12/4/2018    Procedure: 5TH METATARSAL BONE PARTIAL RESECTION, FULL THICKNESS DEBRIDEMENT OF DIABETIC ULCER;  Surgeon: Shala Flynn DPM;  [facility] " [facility];  Service: Podiatry    CLOSURE DELAYED PRIMARY Left 10/30/2023    Procedure: CLOSURE DELAYED PRIMARY left foot;  Surgeon: Minh Urena DPM;  [facility];  Service: Podiatry    CLUB FOOT RELEASE Bilateral     COLONOSCOPY      COMPLEX WOUND CLOSURE TO EXTREMITY  4/27/2021    Procedure: COMPLEX WOUND CLOSURE TO EXTREMITY: RIGHT FOOT;  Surgeon: Niraj Bojorquez MD;  [facility];  Service: Plastics    EXTERNAL FIXATOR APPLICATION Right 2/12/2021    Procedure: Application multiplane external fixation;  Surgeon: Harry Zamudio DPM;  [facility];  Service: Podiatry    FOOT HARDWARE REMOVAL Right 2/17/2021    Procedure: REMOVAL EXTERNAL FIXATOR;  Surgeon: Harry Zamudio DPM;  [facility];  Service: Podiatry    FOREIGN BODY REMOVAL Right 4/27/2021    Procedure: REMOVAL FOREIGN BODY EXTREMITY: RIGHT FOOT;  Surgeon: Niraj Bojorquez MD;  [facility];  Service: Plastics    INCISION AND DRAINAGE OF WOUND Right 2/17/2021    Procedure: INCISION AND DRAINAGE (I&D) EXTREMITY;  Surgeon: Harry Zamudio DPM;  [facility];  Service: Podiatry    INCISION AND DRAINAGE OF WOUND Left 10/26/2023    Procedure: INCISION AND DRAINAGE (I&D) EXTREMITY; WASHOUT; REMOVAL OF FOREIGN BODY;  Surgeon: Satnam Ponce DPM;  [facility];  Service: Podiatry    ORIF FOOT FRACTURE Right 3/4/2021    Procedure: VAC PLACEMENT; SCREW FIXATION RIGHT FOOT FUSION;  Surgeon: Harry Zamudio DPM;  [facility];  Service: Podiatry    PA AMPUTATION FOOT TRANSMETARSAL Right 2/22/2021    Procedure: AMPUTATION TRANSMETATARSAL (TMA), REMOVAL NAIL IM T2 ICF HARDWARE;  Surgeon: Harry Zamudio DPM;  [facility];  Service: Podiatry    PA AMPUTATION METATARSAL W/TOE SINGLE Left 11/29/2023    Procedure: LEFT FOURTH RAY RESECTION FOOT;  Surgeon: Harry Zamudio DPM;  [facility];  Service: Podiatry    PA ARTHRD MIDTARSL/TARS MLT/TRANSVRS W/OSTEOT Right  2/12/2021    Procedure: foot ARTHRODESIS/FUSION;  Surgeon: Harry Zamudio DPM;  [facility];  Service: Podiatry    CA DEBRIDEMENT BONE 1ST 20 SQ CM/< Right 12/2/2021    Procedure: DEBRIDEMENT OF TARSAL BONES WITH ANITBIODIC BEADS;  Surgeon: Harry Zamudio DPM;  [facility];  Service: Podiatry    CA LNGTH/SHRT TENDON LEG/ANKLE 1 TENDON SPX Right 2/12/2021    Procedure: LENGTHEN TIBIAL TENDON, TRANS HEEL CORD;  Surgeon: Harry Zamudio DPM;  [facility];  Service: Podiatry    CA MUSC MYOCUTANEOUS/FASCIOCUTANEOUS FLAP TRUNK Right 4/12/2021    Procedure: RECONSTRUCTION MICROSURGICAL W/ FREE FLAP;  Surgeon: Niraj Bojorquez MD;  [facility];  Service: Plastics    CA MUSC MYOCUTANEOUS/FASCIOCUTANEOUS FLAP TRUNK Right 4/12/2021    Procedure: TAKEBACK RECONSTRUCTION MICROSURGICAL W/ FREE FLAP;  Surgeon: Niraj Bojorquez MD;  [facility];  Service: Plastics    CA MUSC MYOCUTANEOUS/FASCIOCUTANEOUS FLAP TRUNK Right 4/13/2021    Procedure: RECONSTRUCTION MICROSURGICAL W/ FREE FLAP, RE EXPLORATION, MICRO VASCULAR REVISION;  Surgeon: Niraj Bojorquez MD;  [facility];  Service: Plastics    CA OSTC PRTL EXOSTC/CONDYLC METAR HEAD Right 12/29/2020    Procedure: EXCISION EXOSTOSIS;  Surgeon: Harry Zamudio DPM;  [facility];  Service: Podiatry    CA SECONDARY CLOSURE SURG WOUND/DEHSN XTNSV/COMP Right 12/29/2020    Procedure: PRIMARY DELAYED CLOSURE;  Surgeon: Harry Zamudio DPM;  [facility];  Service: Podiatry    TOE AMPUTATION Right     partial great toe    TONSILLECTOMY      VAC DRESSING APPLICATION Right 3/1/2021    Procedure: APPLICATION VAC DRESSING EXTREMITY;  Surgeon: Niraj Bojorquez MD;  [facility];  Service: Plastics    VASECTOMY  1992    WOUND DEBRIDEMENT Right 3/1/2021    Procedure: DEBRIDEMENT LOWER EXTREMITY (WASH OUT);  Surgeon: Niraj Bojorquez MD;  [facility];  Service:  Plastics    WOUND DEBRIDEMENT Right 4/7/2021    Procedure: DEBRIDEMENT RIGHT FOOT;  Surgeon: Niraj Bojorquez MD;  [facility];  Service: Plastics    WOUND DEBRIDEMENT Right 4/12/2021    Procedure: DEBRIDEMENT LOWER EXTREMITY (WASH OUT);  Surgeon: Niraj Bojorquez MD;  [facility];  Service: Plastics    WOUND DEBRIDEMENT Right 4/27/2021    Procedure: DEBRIDEMENT LOWER EXTREMITY (WASH OUT): RIGHT FOOT;  Surgeon: Niraj Bojorquez MD;  [facility];  Service: Plastics        ALLERGIES:  Simvastatin and Itraconazole    MEDICATIONS:  Current Outpatient Medications   Medication Sig Dispense Refill    Accu-Chek Guide test strip Use as instructed tid 200 strip 12    acetaminophen (Mapap Arthritis Pain) 650 mg CR tablet Take 1 tablet (650 mg total) by mouth 2 (two) times a day 60 tablet 1    atorvastatin (LIPITOR) 40 mg tablet Take 1 tablet (40 mg total) by mouth daily 90 tablet 3    chlorthalidone 25 mg tablet TAKE 1 TABLET (25 MG TOTAL) BY MOUTH DAILY 90 tablet 0    Continuous Blood Gluc Sensor (FreeStyle Taylor 3 Sensor) MISC Use 1 each every 14 (fourteen) days      diltiazem (CARDIZEM) 120 MG tablet Take 1 tablet (120 mg total) by mouth 2 (two) times a day 180 tablet 0    Easy Comfort Pen Needles 33G X 5 MM MISC Inject under the skin 4 (four) times a day 100 each 11    Eliquis 5 MG Take 1 tablet (5 mg total) by mouth 2 (two) times a day 60 tablet 11    insulin lispro (HumaLOG) 100 units/mL injection pen Inject 8 Units under the skin 3 (three) times a day with meals 6 mL 3    Lancets (onetouch ultrasoft) lancets Use as instructed 100 each 3    Lancets (onetouch ultrasoft) lancets Use daily 100 each 3    lisinopril (ZESTRIL) 40 mg tablet Take 1 tablet (40 mg total) by mouth daily 90 tablet 3    metFORMIN (GLUCOPHAGE) 500 mg tablet Take 1 tablet (500 mg total) by mouth 2 (two) times a day with meals 180 tablet 5    metoprolol succinate (TOPROL-XL) 100 mg 24 hr tablet TAKE 1  TABLET (100 MG TOTAL) BY MOUTH 2 (TWO) TIMES A  tablet 1    mupirocin (BACTROBAN) 2 % ointment Apply topically 3 (three) times a day 15 g 0    semaglutide, 0.25 or 0.5 mg/dose, (Ozempic, 0.25 or 0.5 MG/DOSE,) 2 mg/3 mL injection pen 0.25 mg under the skin every 7 days for 4 doses (28 days), THEN 0.5 mg under the skin every 7 days 3 mL 1    tamsulosin (FLOMAX) 0.4 mg Take 1 capsule (0.4 mg total) by mouth daily with dinner 90 capsule 2    Insulin Glargine Solostar 100 UNIT/ML SOPN INJECT 0.4 ML (40 UNITS TOTAL) UNDER THE SKIN DAILY AT BEDTIME 12 mL 5     No current facility-administered medications for this visit.       SOCIAL HISTORY:  Social History     Socioeconomic History    Marital status: /Civil Union     Spouse name: Not on file    Number of children: Not on file    Years of education: Not on file    Highest education level: Not on file   Occupational History    Not on file   Tobacco Use    Smoking status: Former     Current packs/day: 0.00     Types: Cigarettes     Quit date: 1988     Years since quittin.6    Smokeless tobacco: Former    Tobacco comments:     exposure to passive smoke   Vaping Use    Vaping status: Never Used   Substance and Sexual Activity    Alcohol use: Yes     Comment: occasionally    Drug use: Not Currently    Sexual activity: Yes     Partners: Female   Other Topics Concern    Not on file   Social History Narrative    Not on file     Social Determinants of Health     Financial Resource Strain: Not on file   Food Insecurity: No Food Insecurity (10/25/2023)    Hunger Vital Sign     Worried About Running Out of Food in the Last Year: Never true     Ran Out of Food in the Last Year: Never true   Transportation Needs: No Transportation Needs (10/25/2023)    PRAPARE - Transportation     Lack of Transportation (Medical): No     Lack of Transportation (Non-Medical): No   Physical Activity: Not on file   Stress: Not on file   Social Connections: Not on file   Intimate  "Partner Violence: Not on file   Housing Stability: Unknown (10/25/2023)    Housing Stability Vital Sign     Unable to Pay for Housing in the Last Year: No     Number of Places Lived in the Last Year: Not on file     Unstable Housing in the Last Year: No        REVIEW OF SYSTEMS:  GENERAL: No fever or chills  HEART: No chest pain, or palpitation  RESPIRATORY:  No acute SOB or cough  GI: No Nausea, vomit or diarrhea  NEUROLOGIC: No syncope or acute weakness    PHYSICAL EXAM:    /77   Pulse 87   Ht 6' 4\" (1.93 m)   Wt 108 kg (237 lb)   BMI 28.85 kg/m²     VASCULAR EXAM  Dorsalis pedis  absent, Posterior tibial artery  +1.  The patient has class findings with skin atrophy, lack of digital hair, and nail dystrophy.  There is +1 lower extremity edema bilaterally.  Venous stasis skin changes noted BLE.    NEUROLOGIC EXAM  Sensation is intact to light touch.  Sensation is absent to 10gm monofilament.  No focal neurologic deficit.          DERMATOLOGIC EXAM:   Wound remains healed left 3rd toe.  No redness or swelling.  No drainage.  No purulence.      MUSCULOSKELETAL EXAM:   No acute joint pain, edema, or redness.  No acute musculoskeletal problem.  Patient has deformity including hammertoes.  S/P left 4th ray amp and right TMA.          "

## 2024-07-10 ENCOUNTER — CLINICAL SUPPORT (OUTPATIENT)
Dept: FAMILY MEDICINE CLINIC | Facility: CLINIC | Age: 74
End: 2024-07-10

## 2024-07-10 DIAGNOSIS — E11.40 TYPE 2 DIABETES MELLITUS WITH DIABETIC NEUROPATHY, WITHOUT LONG-TERM CURRENT USE OF INSULIN (HCC): Primary | ICD-10-CM

## 2024-07-10 NOTE — PROGRESS NOTES
[facility] Clinical [facility] Pharmacy Services   Juan Manuel Villalobos, Ailyn     Won Gaitan Jr. is a 74 y.o. male who was referred to the clinical pharmacist by Samuel Ornelas MD for diabetes. Patient presents via telephone for follow-up.     This virtual check-in was done via telephone.   Encounter provider: John Kerns    Patient agrees to participate in a virtual check in via telephone or video visit instead of presenting to the office to address urgent/immediate medical needs.     After connecting, the patient was identified by name and date of birth.  Won Gaitan Jr. was informed that this was a telemedicine visit and that the exam was being conducted via the [employer] Teams platform. He agrees to proceed. Patient is located in the following state in which I hold an active license ([place]). My office door was closed. No one else was in the room.  Won Gaitan Jr. acknowledged consent and understanding of privacy and security of the telemedicine visit.  I informed the patient that I have reviewed his record in Epic and presented the opportunity for him to ask any questions regarding the visit today. The patient agreed to participate.      Assessment/Plan:     Patient with long-term bolus insulin use. Is familiar with rapid response of lowering glucose levels. Discussed with patient differences in action between Humalog vs. Ozempic. He does have post-prandial hyperglycemia; difficult to evaluate for exaggerated response as patient is using Humalog irregularly. Immediate priority is eliminating low glucose symptoms. Goal is to taper off Humalog (given requirement for multiple daily injections and patient's tendency to dose inappropriately) and use Ozempic as alternative. Will slow transition and stress need for consistent insulin dosing.     Diabetes: Health Status: Goals - A1c: <7-7.5%; Time In Range: >70% with TBR <4% and <1% of time with glucose <54 mg/dL: per ADA Guidelines.  - Most recent A1c:  above goal.   - CGM TIR: just below goal and has improved; TAR has decreased; TBR meets goal.  Current DM Regimen:  Humalog 4-8 units SQ TID  Lantus 40 units SQ HS (0.37 units/kg)  Metformin 500 mg BID  Ozempic 0.5 mg SQ weekly (Sundays)    The following actions were taken today by the Clinical Pharmacist:   1. Continue Ozempic 0.5 mg  2. Take Humalog 4 units before each meal - Do not take any Humalog after meals.   - Humalog refill requested  3. Reduce Lantus to 35 units SQ HS    Follow-up:   Follow-up with pharmacist in 1 week  Next PCP visit: 7/12/2024    - Home Monitoring Records: CGM data  - Labs: A1c due now    The following items are to be considered at a future visit:   1. Monitor low glucose symptoms/events  2. Taper off Humalog  3. Titrate Ozempic as needed  4. Maximize metformin if appropriate    Medication Reconciliation: Medication list reviewed with patient at today's visit and updated to reflect medications patient is currently taking.   - Reviewed and updated allergies    Subjective:     Patient is now taking Ozempic 0.5 mg SQ weekly, first dose on 7/7/2024. Denies any GI side effects.  At last visit, taper of Humalog was scheduled. He stopped Humalog before breakfast and lunch, however he was not comfortable with resultant post-prandial glucose levels. He has been giving various doses of Humalog after meals.     Also reports symptoms of low glucose levels occurring overnight/early morning. Glucose readings are in the 80s-90s at this time. Due to these symptoms, patient has been adjusting HS Lantus dose. Sometimes skips dose or gives only 10 units.     DM History:  Microvascular complications:  nephropathy and peripheral neuropathy; S/P transmetatarsal amputation of R foot;  S/P amputation of L lesser toe   Cardiovascular complications: none  - Aspirin: No; Eliquis  - Statin: Yes   - ACEi/ARB: Yes    Previous DM medications:   Januvia  Glimepiride    CV Risk Factors:  - HF: No  - HTN: Yes - CKD: No    - Obesity: No; Overweight - Dyslipidemia: Yes - Albuminuria: Yes   - Smoking: Former      - Thyroid disorder: No  - History of pancreatitis: No    DM Self-Management:  - Self-monitoring: are performed regularly.  He checks glucose levels very frequently using CGM (> 30 scans/day). Patient is connected to clinic [employer] account.  - Hypoglycemic episodes: symptomatic with glucose levels 80-90 - History of Level 3/severe hypoglycemic event: never   - Hypoglycemia symptoms: weak, shaky - Treatment of hypoglycemia: juice   Glucometer: Yes, Brand: Accu-Check Guide Me   CGM: Accu-Check Guide Me , Brand: FreeStyle Taylor 3     Drug Therapy Problems:  - Medication Adherence: Responsible for medication management: patient and spouse. Medication adherence: taking as prescribed. Patient denies missed/extra doses.   - Medication Safety:  Side effects from medication(s) reported:  None    Preventative Care:  Eye Exam: Overdue  Foot Exam: Up to date; followed by Podiatry  Vaccination: Immunizations not up to date per review of EHR (meningococcal, Hep A & B, zoster, RSV)    Objective:   CGM Data Review:  Device: FreeStyle Taylor 3  Dates: 6/27 - 7/10/2024  Usage: 98%  Average glucose (mg/dL): 165  GMI (approx. lab A1c): 7.3%  Glycemic variability (CV): 21.6%    Glycemic patterns: Well controlled throughout the day. Lower glucose levels overnight/early morning (2A-4A)  Hypoglycemia: none     Metric CGM Target   Time above range (TAR) Very High >250 mg/dL 2 <5%   Time above range (TAR) High >180 mg/dL 29 <25%   Time in range (TIR)  mg/dL 69 >70%   Time below range (TBR) Low <70 mg/dL 0 <4%    Time below range (TBR)Very Low <54 mg/dL 0 <1%     Current Outpatient Medications   Medication Instructions    Accu-Chek Guide test strip Use as instructed tid    acetaminophen (MAPAP ARTHRITIS PAIN) 650 mg, Oral, 2 times daily    atorvastatin (LIPITOR) 40 mg, Oral, Daily    chlorthalidone 25 mg tablet TAKE 1 TABLET (25 MG TOTAL) BY  MOUTH DAILY    Continuous Blood Gluc Sensor (FreeStyle Taylor 3 Sensor) MISC 1 each, Does not apply, Every 14 days    diltiazem (CARDIZEM) 120 mg, Oral, 2 times daily    Easy Comfort Pen Needles 33G X 5 MM MISC Subcutaneous, 4 times daily    Eliquis 5 mg, Oral, 2 times daily    Insulin Glargine Solostar 40 Units, Subcutaneous, Daily at bedtime    insulin lispro (HUMALOG) 8 Units, Subcutaneous, 3 times daily with meals    Lancets (onetouch ultrasoft) lancets Use as instructed    Lancets (onetouch ultrasoft) lancets Other, Daily    lisinopril (ZESTRIL) 40 mg, Oral, Daily    metFORMIN (GLUCOPHAGE) 500 mg, Oral, 2 times daily with meals    metoprolol succinate (TOPROL-XL) 100 mg, Oral, 2 times daily    mupirocin (BACTROBAN) 2 % ointment Topical, 3 times daily    semaglutide, 0.25 or 0.5 mg/dose, (Ozempic, 0.25 or 0.5 MG/DOSE,) 2 mg/3 mL injection pen 0.25 mg under the skin every 7 days for 4 doses (28 days), THEN 0.5 mg under the skin every 7 days    tamsulosin (FLOMAX) 0.4 mg, Oral, Daily with dinner     Allergies   Allergen Reactions    Simvastatin Myalgia    Itraconazole Other (See Comments)     Pt denies knowledge of allergy..      I have reviewed the patient's allergies, medications and history as noted in the electronic medical record and updated as necessary.    Pertinent Lab Data:  Lab Results   Component Value Date    SODIUM 137 05/13/2024    K 4.0 05/13/2024     05/13/2024    CO2 27 05/13/2024    BUN 25 05/13/2024    CREATININE 1.04 05/13/2024    GLUC 133 05/13/2024    CALCIUM 9.6 05/13/2024     Lab Results   Component Value Date    HGBA1C 9.1 (A) 04/05/2024      Pharmacist Tracking Tool:   Reason For Outreach: Embedded Pharmacist  Demographics:  Intervention Method: Phone  Type of Intervention: Follow-Up  Topics Addressed: Diabetes  Pharmacologic Interventions: Dose or Frequency Adjusted and Prevent or Manage HEAVEN  Non-Pharmacologic Interventions: Personal CGM  Time:  Direct Patient Care:  35  mins  Care  Coordination:  25  mins  Recommendation Recipient: Patient/Caregiver  Outcome: Accepted    Juan Manuel Villalobos PharmD  Clinical Integration Pharmacist  [facility]  [phone]

## 2024-07-12 ENCOUNTER — OFFICE VISIT (OUTPATIENT)
Dept: FAMILY MEDICINE CLINIC | Facility: CLINIC | Age: 74
End: 2024-07-12

## 2024-07-12 VITALS
TEMPERATURE: 97.3 F | BODY MASS INDEX: 28.86 KG/M2 | DIASTOLIC BLOOD PRESSURE: 60 MMHG | HEIGHT: 76 IN | OXYGEN SATURATION: 94 % | WEIGHT: 237 LBS | SYSTOLIC BLOOD PRESSURE: 120 MMHG | HEART RATE: 64 BPM

## 2024-07-12 DIAGNOSIS — Z00.00 MEDICARE ANNUAL WELLNESS VISIT, SUBSEQUENT: Primary | ICD-10-CM

## 2024-07-12 DIAGNOSIS — I10 ESSENTIAL HYPERTENSION: ICD-10-CM

## 2024-07-12 DIAGNOSIS — E11.40 TYPE 2 DIABETES MELLITUS WITH DIABETIC NEUROPATHY, WITHOUT LONG-TERM CURRENT USE OF INSULIN (HCC): ICD-10-CM

## 2024-07-12 LAB — SL AMB POCT HEMOGLOBIN AIC: 8 (ref ?–6.5)

## 2024-07-12 NOTE — PATIENT INSTRUCTIONS
Please stop chlorthalidone regarding blood pressure and due to your dizziness and lightheadedness.

## 2024-07-12 NOTE — ASSESSMENT & PLAN NOTE
Lab Results   Component Value Date    HGBA1C 9.1 (A) 04/05/2024     A1c 8.0, continue meds. Sees clinical pharm.    Improving!!

## 2024-07-12 NOTE — PROGRESS NOTES
Ambulatory Visit  Name: Won Gaitan Jr.      : 1950      MRN: [id number]  Encounter Provider: Pieter Torres DO  Encounter Date: 2024   Encounter department: [facility]    Assessment & Plan   1. Medicare annual wellness visit, subsequent  Assessment & Plan:  Normal MAW.  2. Type 2 diabetes mellitus with diabetic neuropathy, without long-term current use of insulin ([facility])  Assessment & Plan:    Lab Results   Component Value Date    HGBA1C 9.1 (A) 2024     A1c 8.0, continue meds. Sees clinical pharm.    Improving!!  Orders:  -     IRIS Diabetic eye exam  -     Albumin / creatinine urine ratio; Future  -     POCT hemoglobin A1c  -     Albumin / creatinine urine ratio; Future; Expected date: 2025  -     Comprehensive metabolic panel; Future; Expected date: 2025  -     Hemoglobin A1C; Future; Expected date: 2025  3. Essential hypertension      Depression Screening and Follow-up Plan: Patient was screened for depression during today's encounter. They screened negative with a PHQ-2 score of 0.      Preventive health issues were discussed with patient, and age appropriate screening tests were ordered as noted in patient's After Visit Summary. Personalized health advice and appropriate referrals for health education or preventive services given if needed, as noted in patient's After Visit Summary.    History of Present Illness     DM2 followup and MAW.    MAW normal.    DM2, Bgs better.       Patient Care Team:  Samuel Ornelas MD as PCP - General (Family Medicine)  Harry Zamudio DPM as PCP - Wound (Podiatry)  Samuel Ornelas MD as PCP - PCP-[facility] ([facility])  Williams Enrique MD (Cardiology)  Laine Howell as Diabetes Educator (Nutrition)    Review of Systems   Constitutional:  Negative for chills and fever.   HENT:  Negative for ear pain and sore throat.    Eyes:  Negative for pain and visual disturbance.   Respiratory:  Negative for cough and shortness  of breath.    Cardiovascular:  Negative for chest pain and palpitations.   Gastrointestinal:  Negative for abdominal pain and vomiting.   Genitourinary:  Negative for dysuria and hematuria.   Musculoskeletal:  Negative for arthralgias and back pain.   Skin:  Negative for color change and rash.   Neurological:  Negative for seizures and syncope.   All other systems reviewed and are negative.    Medical History Reviewed by provider this encounter:  Tobacco  Allergies  Meds  Problems  Med Hx  Surg Hx  Fam Hx       Annual Wellness Visit Questionnaire   Won is here for his Subsequent Wellness visit. Last Medicare Wellness visit information reviewed, patient interviewed, no change since last AWV.     Health Risk Assessment:   Patient rates overall health as good. Patient feels that their physical health rating is same. Patient is satisfied with their life. Eyesight was rated as same. Hearing was rated as same. Patient feels that their emotional and mental health rating is slightly worse. Patients states they are never, rarely angry. Patient states they are sometimes unusually tired/fatigued. Pain experienced in the last 7 days has been none. Patient states that he has experienced no weight loss or gain in last 6 months.     Depression Screening:   PHQ-2 Score: 0      Fall Risk Screening:   In the past year, patient has experienced: no history of falling in past year      Home Safety:  Patient does not have trouble with stairs inside or outside of their home. Patient has working smoke alarms and has working carbon monoxide detector.     Nutrition:   Current diet is Regular.     Medications:   Patient is not currently taking any over-the-counter supplements. Patient is able to manage medications.     Activities of Daily Living (ADLs)/Instrumental Activities of Daily Living (IADLs):   Walk and transfer into and out of bed and chair?: Yes  Dress and groom yourself?: Yes    Bathe or shower yourself?: Yes    Feed  yourself? Yes  Do your laundry/housekeeping?: Yes  Manage your money, pay your bills and track your expenses?: Yes  Make your own meals?: Yes    Do your own shopping?: Yes    Previous Hospitalizations:   Any hospitalizations or ED visits within the last 12 months?: Yes    How many hospitalizations have you had in the last year?: 1-2    Advance Care Planning:   Living will: Yes    Advanced directive: Yes      PREVENTIVE SCREENINGS      Cardiovascular Screening:    General: Screening Not Indicated and History Lipid Disorder      Diabetes Screening:     General: Screening Not Indicated and History Diabetes      Colorectal Cancer Screening:     General: Screening Current      Abdominal Aortic Aneurysm (AAA) Screening:    Risk factors include: age between 65-76 yo and tobacco use        Lung Cancer Screening:     General: Screening Not Indicated      Hepatitis C Screening:    General: Screening Current    Other Counseling Topics:   Car/seat belt/driving safety, skin self-exam, sunscreen and calcium and vitamin D intake and regular weightbearing exercise.     Social Determinants of Health     Food Insecurity: No Food Insecurity (7/12/2024)    Hunger Vital Sign    • Worried About Running Out of Food in the Last Year: Never true    • Ran Out of Food in the Last Year: Never true   Transportation Needs: No Transportation Needs (7/12/2024)    PRAPARE - Transportation    • Lack of Transportation (Medical): No    • Lack of Transportation (Non-Medical): No   Housing Stability: Low Risk  (7/12/2024)    Housing Stability Vital Sign    • Unable to Pay for Housing in the Last Year: No    • Number of Times Moved in the Last Year: 0    • Homeless in the Last Year: No   Utilities: Not At Risk (7/12/2024)    [facility] Utilities    • Threatened with loss of utilities: No     No results found.    Objective     /60 (BP Location: Left arm, Patient Position: Sitting, Cuff Size: Standard)   Pulse 64   Temp (!) 97.3 °F (36.3 °C) (Temporal)    "Ht 6' 4\" (1.93 m)   Wt 108 kg (237 lb)   SpO2 94%   BMI 28.85 kg/m²     Physical Exam  Vitals reviewed.   Constitutional:       Appearance: Normal appearance. He is not ill-appearing.   HENT:      Head: Normocephalic and atraumatic.      Nose: Nose normal.      Mouth/Throat:      Mouth: Mucous membranes are moist.      Pharynx: Oropharynx is clear.   Eyes:      Conjunctiva/sclera: Conjunctivae normal.      Pupils: Pupils are equal, round, and reactive to light.   Cardiovascular:      Rate and Rhythm: Normal rate and regular rhythm.      Pulses: Normal pulses.      Heart sounds: Normal heart sounds. No murmur heard.  Pulmonary:      Effort: Pulmonary effort is normal.      Breath sounds: Normal breath sounds. No wheezing.   Abdominal:      General: Bowel sounds are normal.      Palpations: Abdomen is soft.   Musculoskeletal:         General: Tenderness and signs of injury present.      Cervical back: Normal range of motion.   Neurological:      Mental Status: He is alert and oriented to person, place, and time.      Motor: Weakness present.      Gait: Gait abnormal.   Psychiatric:         Mood and Affect: Mood normal.         Behavior: Behavior normal.         Thought Content: Thought content normal.         Judgment: Judgment normal.       Administrative Statements   I have spent a total time of 40 minutes in caring for this patient on the day of the visit/encounter including Diagnostic results, Prognosis, Risks and benefits of tx options, Instructions for management, Impressions, Counseling / Coordination of care, Documenting in the medical record, Reviewing / ordering tests, medicine, procedures  , and Obtaining or reviewing history  .    "

## 2024-07-17 ENCOUNTER — PATIENT OUTREACH (OUTPATIENT)
Dept: FAMILY MEDICINE CLINIC | Facility: CLINIC | Age: 74
End: 2024-07-17

## 2024-07-17 ENCOUNTER — CLINICAL SUPPORT (OUTPATIENT)
Dept: FAMILY MEDICINE CLINIC | Facility: CLINIC | Age: 74
End: 2024-07-17

## 2024-07-17 DIAGNOSIS — E11.40 TYPE 2 DIABETES MELLITUS WITH DIABETIC NEUROPATHY, WITHOUT LONG-TERM CURRENT USE OF INSULIN (HCC): Primary | ICD-10-CM

## 2024-07-17 NOTE — PROGRESS NOTES
Covering OP CM called to pt in regards to pt being in donut hole with insurance.  Pt is doing well on Ozempic and would like to continue with this medication.  Pt states he does not know his income and his wife handles their financial information and she is not home.  Pt requested information be sent via email to him and states his wife is savvy on the computer.  Confirmed pts email and info sent:    Hello,     Here is the info for PACENET:    PACE and PACENET eligibility is determined by your previous calendar year's income.   You must be 65 years of age or older.  A [place] resident for at least 90 days prior to the date of application.  You cannot be enrolled in the Department of Human Service's Medicaid prescription benefit.  Income requirements based on previous year gross income  PACE Income Limits  For a single person, total income must be $14,500 or less.  For a  couple, combined total income must be $17,700 or less.  PACENET Income Limits  For a single person, total income can be between $14,500 and $33,500  For a  couple, combined total income can be between $17,700 and $41,500  How to apply  Phone: Call [phone] to apply by phone. Please have income and insurance information available  Apply online: [email or web address]    Also you can apply for Ozempic assistance directly through  by either [phone] or [email or web address]    If you have any difficulty please reach out to your  Gely [phone].    Take CareDanay

## 2024-07-17 NOTE — PROGRESS NOTES
[facility]   Juan Manuel Villalobos, Ailyn     Won Gaitan Jr. is a 74 y.o. male who was referred to the clinical pharmacist by Samuel Ornelas MD for diabetes. Patient presents via telephone for follow-up.     This virtual check-in was done via telephone.   Encounter provider: John Kerns    Patient agrees to participate in a virtual check in via telephone or video visit instead of presenting to the office to address urgent/immediate medical needs.     After connecting, the patient was identified by name and date of birth.  Won Gaitan Jr. was informed that this was a telemedicine visit and that the exam was being conducted via the Microsoft Teams platform. He agrees to proceed. Patient is located in the following state in which I hold an active license ([place]). My office door was closed. No one else was in the room.  Won Gaitan Jr. acknowledged consent and understanding of privacy and security of the telemedicine visit.  I informed the patient that I have reviewed his record in Epic and presented the opportunity for him to ask any questions regarding the visit today. The patient agreed to participate.      Assessment/Plan:     Patient and wife need to discuss if continuing Ozempic is financially sustainable.     If not able to continue Ozempic, will need to plan to resume basal/bolus. Patient has been self-adjusting insulin doses which complicates evaluation of glycemic control and diabetes management.    Patient to contact clinical pharmacist after he and his wife make decision regarding Ozempic. He understands this needs to be made promptly.     Diabetes: Health Status: Goals - A1c: <7-7.5%; Time In Range: >70% with TBR <4% and <1% of time with glucose <54 mg/dL: per ADA Guidelines.  - Most recent A1c: above goal, but is trending down.   - CGM TIR: has achieved goal and TBR meets goal.  Current DM Regimen:  Humalog 4 units SQ TID  Lantus 25 units SQ HS (0.23  units/kg)  Metformin 500 mg BID  Ozempic 0.5 mg SQ weekly (Sundays)    The following actions were taken today by the Clinical Pharmacist:   1. Referral to social work for assistance with Ozempic cost    Follow-up:   Follow-up with pharmacist CELESTINA  Next PCP visit: 1/13/2025    - Home Monitoring Records: CGM data  - Labs: A1c due on or after 10/10/2024    Medication Reconciliation: Medication list reviewed with patient at today's visit and updated to reflect medications patient is currently taking.   - Reviewed and updated allergies    Subjective:     Patient is still using first Ozempic pen prescribed (has 4 doses of 0.25 mg and 2 doses of 0.5 mg). Took last dose of 0.5 mg on Sunday 7/14. Went to get refill of Ozempic 0.5 mg and was told it would cost $234 as he is in the coverage gap.     Patient continues to self-adjust insulin. Has decreased Lantus to 25 units SQ daily.     DM History:  Microvascular complications:  nephropathy and peripheral neuropathy; S/P transmetatarsal amputation of R foot;  S/P amputation of L lesser toe   Cardiovascular complications: none  - Aspirin: No; Eliquis  - Statin: Yes   - ACEi/ARB: Yes    Previous DM medications:   Januvia  Glimepiride    CV Risk Factors:  - HF: No  - HTN: Yes - CKD: No   - Obesity: No; Overweight - Dyslipidemia: Yes - Albuminuria: Yes   - Smoking: Former      - Thyroid disorder: No  - History of pancreatitis: No    DM Self-Management:  - Self-monitoring: are performed regularly.  He checks glucose levels very frequently using CGM (> 30 scans/day). Patient is connected to clinic [employer] account.  - Hypoglycemic episodes: symptomatic with glucose levels 80-90 - History of Level 3/severe hypoglycemic event: never   - Hypoglycemia symptoms: weak, shaky - Treatment of hypoglycemia: juice   Glucometer: Yes, Brand: Accu-Check Guide Me   CGM: Accu-Check Guide Me , Brand: FreeStyle Taylor 3     Drug Therapy Problems:  - Medication Adherence: Responsible for medication  management: patient and spouse. Medication adherence: takes oral meds as prescribed. Has history of self-adjusting insulin doses. Patient denies missed/extra doses.   - Medication Safety:  Side effects from medication(s) reported:  None    Preventative Care:  Eye Exam: Overdue  Foot Exam: Up to date; followed by Podiatry  Vaccination: Immunizations not up to date per review of EHR (meningococcal, Hep A & B, zoster, RSV)    Objective:   CGM Data Review:  Device: FreeStyle Taylor 3  Dates: 7/11 - 7/17/2024  Usage: 95%  Average glucose (mg/dL): 163  GMI (approx. lab A1c): 7.2%  Glycemic variability (CV): 19.1%    Glycemic patterns: Well controlled throughout the day. Overnight/early morning (2A-4A) readings have increased.  Hypoglycemia: none     Metric CGM Target   Time above range (TAR) Very High >250 mg/dL 2 <5%   Time above range (TAR) High >180 mg/dL 26 <25%   Time in range (TIR)  mg/dL 74 >70%   Time below range (TBR) Low <70 mg/dL 0 <4%    Time below range (TBR)Very Low <54 mg/dL 0 <1%     Current Outpatient Medications   Medication Instructions    Accu-Chek Guide test strip Use as instructed tid    acetaminophen (MAPAP ARTHRITIS PAIN) 650 mg, Oral, 2 times daily    atorvastatin (LIPITOR) 40 mg, Oral, Daily    Continuous Blood Gluc Sensor (FreeStyle Taylor 3 Sensor) MISC 1 each, Does not apply, Every 14 days    diltiazem (CARDIZEM) 120 mg, Oral, 2 times daily    Easy Comfort Pen Needles 33G X 5 MM MISC Subcutaneous, 4 times daily    Eliquis 5 mg, Oral, 2 times daily    Insulin Glargine Solostar 40 Units, Subcutaneous, Daily at bedtime    insulin lispro (HUMALOG) 8 Units, Subcutaneous, 3 times daily with meals    Lancets (onetouch ultrasoft) lancets Use as instructed    Lancets (onetouch ultrasoft) lancets Other, Daily    lisinopril (ZESTRIL) 40 mg, Oral, Daily    metFORMIN (GLUCOPHAGE) 500 mg, Oral, 2 times daily with meals    metoprolol succinate (TOPROL-XL) 100 mg, Oral, 2 times daily    mupirocin  (BACTROBAN) 2 % ointment Topical, 3 times daily    semaglutide, 0.25 or 0.5 mg/dose, (Ozempic, 0.25 or 0.5 MG/DOSE,) 2 mg/3 mL injection pen 0.25 mg under the skin every 7 days for 4 doses (28 days), THEN 0.5 mg under the skin every 7 days    tamsulosin (FLOMAX) 0.4 mg, Oral, Daily with dinner     Allergies   Allergen Reactions    Simvastatin Myalgia    Itraconazole Other (See Comments)     Pt denies knowledge of allergy..      I have reviewed the patient's allergies, medications and history as noted in the electronic medical record and updated as necessary.    Pertinent Lab Data:  Lab Results   Component Value Date    SODIUM 137 05/13/2024    K 4.0 05/13/2024     05/13/2024    CO2 27 05/13/2024    BUN 25 05/13/2024    CREATININE 1.04 05/13/2024    GLUC 133 05/13/2024    CALCIUM 9.6 05/13/2024     Lab Results   Component Value Date    HGBA1C 8.0 (A) 07/12/2024    HGBA1C 9.1 (A) 04/05/2024    HGBA1C 9.5 (A) 01/10/2024      Pharmacist Tracking Tool:   Reason For Outreach: Embedded Pharmacist  Demographics:  Intervention Method: Phone  Type of Intervention: Follow-Up  Topics Addressed: Diabetes  Pharmacologic Interventions: Dose or Frequency Adjusted and Prevent or Manage HEAVEN  Non-Pharmacologic Interventions: Cost and Personal CGM  Time:  Direct Patient Care:  35  mins  Care Coordination:  25  mins  Recommendation Recipient: Patient/Caregiver  Outcome: Accepted    Juan Manuel Villalobos PharmD  Clinical Integration Pharmacist  [facility]  [phone]

## 2024-07-19 ENCOUNTER — TELEPHONE (OUTPATIENT)
Dept: FAMILY MEDICINE CLINIC | Facility: CLINIC | Age: 74
End: 2024-07-19

## 2024-07-19 DIAGNOSIS — E11.40 TYPE 2 DIABETES MELLITUS WITH DIABETIC NEUROPATHY, WITHOUT LONG-TERM CURRENT USE OF INSULIN (HCC): Primary | ICD-10-CM

## 2024-07-19 NOTE — TELEPHONE ENCOUNTER
[facility]  Juan Manuel Villalobos PharmD     Communication with patient: per telephone     Reason for documentation: follow-up    Recommendations:     The following actions were taken today by the Clinical Pharmacist:   1. Patient to return to previous dose of basal/bolus and continue close monitoring with CGM  Humalog 8 units SQ AC  Lantus 40 units SQ HS (0.37 units/kg)  Metformin 500 mg BID  2. Patient to contact clinical pharmacist if frequent/severe hypoglycemia    Follow-up:   Follow-up with pharmacist  7/31/2024  Next PCP visit: 1/13/2025    Findings:      Patient was contacted by REGINA who provided resources regarding cost of Ozempic. However, after discussion he and his wife had decided not to pursue assistance for Ozempic.    He will return to using Lantus + Humalog

## 2024-07-22 ENCOUNTER — TELEPHONE (OUTPATIENT)
Age: 74
End: 2024-07-22

## 2024-07-22 DIAGNOSIS — E11.40 TYPE 2 DIABETES MELLITUS WITH DIABETIC NEUROPATHY, WITHOUT LONG-TERM CURRENT USE OF INSULIN (HCC): Chronic | ICD-10-CM

## 2024-07-22 DIAGNOSIS — E11.40 TYPE 2 DIABETES MELLITUS WITH DIABETIC NEUROPATHY, WITHOUT LONG-TERM CURRENT USE OF INSULIN (HCC): Primary | Chronic | ICD-10-CM

## 2024-07-22 RX ORDER — BLOOD-GLUCOSE SENSOR
1 EACH MISCELLANEOUS
Qty: 1 EACH | Refills: 0 | Status: SHIPPED | OUTPATIENT
Start: 2024-07-22 | End: 2024-07-22 | Stop reason: SDUPTHER

## 2024-07-22 RX ORDER — BLOOD-GLUCOSE SENSOR
1 EACH MISCELLANEOUS
Qty: 1 EACH | Refills: 0 | Status: SHIPPED | OUTPATIENT
Start: 2024-07-22

## 2024-07-22 NOTE — TELEPHONE ENCOUNTER
Received incoming call from patient inquiring about FreeStyle Taylor 3 sensors. States he is currently using last sensor and he is unsure how to get a refill.     Contacted [facility] and was informed that patient's next shipment was sent out earlier today.    Outreach call to patient. Patient unavailable at time of call. Left voicemail with information regarding anticipated delivery. Contact information also provided for follow-up if needed.

## 2024-07-31 ENCOUNTER — CLINICAL SUPPORT (OUTPATIENT)
Dept: FAMILY MEDICINE CLINIC | Facility: CLINIC | Age: 74
End: 2024-07-31

## 2024-07-31 DIAGNOSIS — E11.40 TYPE 2 DIABETES MELLITUS WITH DIABETIC NEUROPATHY, WITHOUT LONG-TERM CURRENT USE OF INSULIN (HCC): Primary | ICD-10-CM

## 2024-07-31 NOTE — PROGRESS NOTES
[facility] Pharmacy [facility]  Juan Manuel Villalobos PharmD     Communication with patient: per telephone     Reason for documentation: follow-up    Recommendations:     The following actions were taken today by the Clinical Pharmacist:   1. Patient to continue basal/bolus and continue close monitoring with CGM  2. Patient and wife to pursue PACE/PACENET application for assistance with Ozempic coverage    Follow-up:   Follow-up with pharmacist in 2 weeks  Next PCP visit: 1/13/2025    - Home Monitoring Records: CGM data  - Labs: A1c due on or after 10/10/2024    Findings:      Patient and wife contacted clinical pharmacist. They were provided PACE/PACENET application by staff at [employer].    Reviewed financial criteria. Wife believes they will likely qualify. Gave instructions for completing application and contact information for PACE office in case of any questions.     Patient's last dose of Ozempic 0.5 mg taken on 7/14, so glucose report may reflect some GLP-1 coverage based on half-life.      Current DM Regimen:  Humalog 8 units SQ AC  Lantus 40 units SQ HS (0.37 units/kg)  Metformin 500 mg BID    Lab Results   Component Value Date    HGBA1C 8.0 (A) 07/12/2024    HGBA1C 9.1 (A) 04/05/2024     Objective:     CGM Data Review:  Device: FreeStyle Taylor 3  Dates: 7/18 - 7/31/2024  Usage: 99%  Average glucose (mg/dL): 159  GMI (approx. lab A1c): 7.1%  Glycemic variability (CV): 22.8%     Glycemic patterns: Well controlled throughout the day. Minimal post-prandial excursions.  Hypoglycemia: none       Metric CGM Target   Time above range (TAR) Very High >250 mg/dL 1 <5%   Time above range (TAR) High >180 mg/dL 25 <25%   Time in range (TIR)  mg/dL 74 >70%   Time below range (TBR) Low <70 mg/dL 0 <4%    Time below range (TBR)Very Low <54 mg/dL 0 <1%      Pharmacist Tracking Tool:   Reason For Outreach: Embedded Pharmacist  Demographics:  Intervention Method: Phone  Type of Intervention:  Follow-Up  Topics Addressed: Diabetes  Pharmacologic Interventions: N/A  Non-Pharmacologic Interventions: Care coordination and Personal CGM  Time:  Direct Patient Care: 15 mins  Care Coordination: 5 mins  Recommendation Recipient: Patient/Caregiver  Outcome: Accepted    Juan Manuel MorrisonD  Clinical Integration Pharmacist  [facility]  [phone]

## 2024-08-03 DIAGNOSIS — E11.40 TYPE 2 DIABETES MELLITUS WITH DIABETIC NEUROPATHY, WITHOUT LONG-TERM CURRENT USE OF INSULIN (HCC): ICD-10-CM

## 2024-08-04 RX ORDER — INSULIN GLARGINE 100 [IU]/ML
INJECTION, SOLUTION SUBCUTANEOUS
Qty: 45 ML | Refills: 1 | Status: SHIPPED | OUTPATIENT
Start: 2024-08-04

## 2024-08-04 RX ORDER — BLOOD SUGAR DIAGNOSTIC
STRIP MISCELLANEOUS
Qty: 100 STRIP | Refills: 2 | Status: SHIPPED | OUTPATIENT
Start: 2024-08-04

## 2024-08-05 DIAGNOSIS — L03.116 CELLULITIS OF LEFT LOWER LEG: ICD-10-CM

## 2024-08-05 RX ORDER — PEN NEEDLE, DIABETIC 32GX 5/32"
NEEDLE, DISPOSABLE MISCELLANEOUS
Qty: 100 EACH | Refills: 4 | Status: SHIPPED | OUTPATIENT
Start: 2024-08-05

## 2024-08-06 RX ORDER — METOPROLOL SUCCINATE 100 MG/1
100 TABLET, EXTENDED RELEASE ORAL 2 TIMES DAILY
Qty: 180 TABLET | Refills: 0 | Status: SHIPPED | OUTPATIENT
Start: 2024-08-06 | End: 2025-02-02

## 2024-08-11 PROBLEM — Z00.00 MEDICARE ANNUAL WELLNESS VISIT, SUBSEQUENT: Status: RESOLVED | Noted: 2022-06-27 | Resolved: 2024-08-11

## 2024-08-14 ENCOUNTER — CLINICAL SUPPORT (OUTPATIENT)
Dept: FAMILY MEDICINE CLINIC | Facility: CLINIC | Age: 74
End: 2024-08-14

## 2024-08-14 DIAGNOSIS — E11.40 TYPE 2 DIABETES MELLITUS WITH DIABETIC NEUROPATHY, WITHOUT LONG-TERM CURRENT USE OF INSULIN (HCC): Primary | ICD-10-CM

## 2024-08-21 NOTE — PROGRESS NOTES
[facility] Clinical Integration Pharmacy Services   Juan Manuel Villalobos, Ailyn     Won Gaitan Jr. is a 74 y.o. male who was referred to the clinical pharmacist by Pieter Torres DO for diabetes. Patient presents via telephone for follow-up.      Assessment/Plan:   Diabetes: Health Status: Goals - A1c: <7-7.5%; Time In Range: >70% with TBR <4% and <1% of time with glucose <54 mg/dL: per ADA Guidelines.  - Most recent A1c: above goal, but is trending down.   - CGM TIR: has achieved goal and TBR meets goal.  TREATMENT REGIMEN: Patient with post-prandial glucose excursions. Will adjust bolus insulin     INTERVENTIONS:   1. CHANGE Humalog to 17/14/17  Orders placed under [facility]    Medication Reconciliation: I have reviewed the patient's allergies, medications and history as noted in the electronic medical record and updated as necessary..   - Rx Insurance: Medicare Advantage    Education:  - Counseled patient on diagnostic results, glycemic goals, instruction for self-management, importance of treatment adherence, and risk factor reduction as appropriate. All questions fully answered    - He was given instructions on how to contact me in the event of adverse drug event, questions, or concerns.    Follow-up:   Follow-up with pharmacist in 2 weeks  Next PCP visit: 1/13/2025    - Home Monitoring Records: CGM data.  - Labs: A1c due on or after 10/10/2024    Subjective:     Won Gaitan Jr. is a 74 y.o. male with a history of type 2 diabetes with long term use of insulin. Diabetes course has been improving with treatment. Denies recent illness or hospitalizations. He denies any polyuria, polydipsia, polyphagia, fatigue and blurry vision. Denies recent hypoglycemic episodes. Denies any issues with his current DM regimen.     DM Self-Management:  - Self-monitoring: is performed regularly.  He checks glucose levels very frequently using CGM (> 30 scans/day). He uses smartphone as . Patient is connected to clinic [employer]  account.   - Hypoglycemic episodes: denies any recent - History of Level 3/severe hypoglycemic event: none   - Hypoglycemia symptoms: weak, shaky; symptomatic with glucose levels 80-90  - Treatment of hypoglycemia: discussed treatment   Glucometer: Yes, Brand: Accu-Check Guide Me   CGM: Yes, Brand: FreeStyle Taylor 3     Current DM Regimen:  Humalog 14 units SQ AC  Lantus 30 units SQ HS (0.28 units/kg)  Metformin 500 mg BID    DM History:  Microvascular complications: nephropathy and peripheral neuropathy; S/P transmetatarsal amputation of R foot; S/P amputation of L lesser toe   Cardiovascular complications: none  - Statin: Yes   - ACEi/ARB: Yes    Previous DM medications:   Januvia  Glimepiride  Ozempic (cost)    CV Risk Factors:  - HF: No  - HTN: Yes - Obesity: No; Overweight   - CKD: normal GFR with albuminuria - Dyslipidemia: Yes - Smoking: Former     - Thyroid disorder: No  - History of pancreatitis: No    Drug Therapy Problems:  - Medication Adherence: Responsible for medication management: patient and spouse. Patient is compliant most of the time; takes oral meds as prescribed. Has history of self-adjusting insulin doses. Denies any side effects from current medications.    Preventative Care:  Ophthamology: Overdue  Podiatry: Up to date; followed by Podiatry   Kidney Health: Up to date    Objective:     CGM Data Review:  Device: FreeStyle Taylor 3  Dates: 8/1 - 8/14/2024  Usage: 100%  Average glucose (mg/dL): 177  GMI (approx. lab A1c): 7.5%  Glycemic variability (CV): 26.5%     Glycemic patterns: Post-prandial excursions after breakfast and dinner.  Hypoglycemia: none       Metric CGM Target   Time above range (TAR) Very High >250 mg/dL 7 <5%   Time above range (TAR) High >180 mg/dL 37 <25%   Time in range (TIR)  mg/dL 56 >70%   Time below range (TBR) Low <70 mg/dL 0 <4%    Time below range (TBR)Very Low <54 mg/dL 0 <1%      Current Outpatient Medications   Medication Instructions    acetaminophen  (MAPAP ARTHRITIS PAIN) 650 mg, Oral, 2 times daily    atorvastatin (LIPITOR) 40 mg, Oral, Daily    Continuous Glucose Sensor (FreeStyle Taylor 3 Sensor) MISC 1 each, Does not apply, Every 14 days    diltiazem (CARDIZEM) 120 mg, Oral, 2 times daily    Eliquis 5 mg, Oral, 2 times daily    glucose blood (Accu-Chek Guide) test strip USE DAILY    Insulin Glargine Solostar (Lantus SoloStar) 100 UNIT/ML SOPN START WITH 15 UNITS DAILY INCREASE S DIRECTED    insulin lispro (HUMALOG) 8 Units, Subcutaneous, 3 times daily with meals    Insulin Pen Needle (Easy Comfort Pen Needles) 33G X 5 MM MISC USE AS DIRECTED    Lancets (onetouch ultrasoft) lancets Use as instructed    Lancets (onetouch ultrasoft) lancets Other, Daily    lisinopril (ZESTRIL) 40 mg, Oral, Daily    metFORMIN (GLUCOPHAGE) 500 mg, Oral, 2 times daily with meals    metoprolol succinate (TOPROL-XL) 100 mg, Oral, 2 times daily    mupirocin (BACTROBAN) 2 % ointment Topical, 3 times daily    semaglutide, 0.25 or 0.5 mg/dose, (Ozempic, 0.25 or 0.5 MG/DOSE,) 2 mg/3 mL injection pen 0.25 mg under the skin every 7 days for 4 doses (28 days), THEN 0.5 mg under the skin every 7 days    tamsulosin (FLOMAX) 0.4 mg, Oral, Daily with dinner     Allergies   Allergen Reactions    Simvastatin Myalgia    Itraconazole Other (See Comments)     Pt denies knowledge of allergy..      Pertinent Lab Data:    Lab Results   Component Value Date    HGBA1C 8.0 (A) 07/12/2024      Pharmacist Tracking Tool:   Reason For Outreach: Embedded Pharmacist  Demographics:  Intervention Method: Phone  Type of Intervention: Follow-Up  Topics Addressed: Diabetes  Pharmacologic Interventions: Dose or Frequency Adjusted  Non-Pharmacologic Interventions: Personal CGM  Time:  Direct Patient Care:  25  mins  Care Coordination:  15  mins  Recommendation Recipient: Patient/Caregiver  Outcome: Accepted    Juan Manuel Villalobos, PharmD  Clinical Integration Pharmacist  [facility]  [phone]

## 2024-08-23 ENCOUNTER — OFFICE VISIT (OUTPATIENT)
Dept: PODIATRY | Facility: CLINIC | Age: 74
End: 2024-08-23
Payer: COMMERCIAL

## 2024-08-23 VITALS — SYSTOLIC BLOOD PRESSURE: 170 MMHG | HEART RATE: 59 BPM | DIASTOLIC BLOOD PRESSURE: 79 MMHG

## 2024-08-23 DIAGNOSIS — Z89.431 S/P TRANSMETATARSAL AMPUTATION OF FOOT, RIGHT (HCC): ICD-10-CM

## 2024-08-23 DIAGNOSIS — E11.40 TYPE 2 DIABETES MELLITUS WITH DIABETIC NEUROPATHY, WITHOUT LONG-TERM CURRENT USE OF INSULIN (HCC): Primary | ICD-10-CM

## 2024-08-23 DIAGNOSIS — Z89.422 S/P AMPUTATION OF LESSER TOE, LEFT (HCC): ICD-10-CM

## 2024-08-23 PROCEDURE — 99213 OFFICE O/P EST LOW 20 MIN: CPT | Performed by: PODIATRIST

## 2024-08-23 NOTE — PROGRESS NOTES
"   PATIENT:  Won Gaitan Jr.  1950        ASSESSMENT/PLAN:  1. Type 2 diabetes mellitus with diabetic neuropathy, without long-term current use of insulin ([facility])        2. S/P transmetatarsal amputation of foot, right ([facility])        3. S/P amputation of lesser toe, left ([facility])              1. Reviewed medical records.  Patient was counseled on the condition and diagnosis.  Educated disease prevention and risks related to diabetes.    2.  Wound remains healed left 3rd toe.  Foot exam bid at home.  HPK removed from right foot.  Continue diabetic shoes.  Instructed skin care and protection.    3. He will return in 2 months.        HPI:  Won Gaitan Jr. is a 74 y.o.year old male seen for foot evaluation.  High risk diabetic foot with history of right TMA and diabetic foot infection.  No recurring ulcer.  He feels well.  Tolerates DM shoes.  No pain.  BS under control.   No new complaint.      PAST MEDICAL HISTORY:  Past Medical History:   Diagnosis Date    Arthritis     Atrial fibrillation (HCC)     Diagnosed 11/2018    Benign prostate hyperplasia 04/2002    Cellulitis     right lower leg     Colon polyp 2006    Diabetes mellitus (HCC)     Hearing aid worn     bilat    Hearing loss     90% loss left ear and 40% right ear    History of clubfoot     \"since birth\"    History of pneumonia     History of TIA (transient ischemic attack) 04/25/2017 11/30/18 pt denies    Hyperlipidemia 5/15/2014    Hypertension     Infectious viral hepatitis     \"cant remember type\"    Irregular heart beat     Afib    Neuropathy     both feet    Osteomyelitis (HCC)     right great toe    Pneumonia     Right middle lobe pulmonary nodule 11/6/2018    Seasickness     Teeth missing     Type 2 diabetes mellitus with diabetic neuropathy (HCC) 11/6/2018    Wears glasses     reading    Wound, open     bottom of right foot       PAST SURGICAL HISTORY:  Past Surgical History:   Procedure Laterality Date    BUNIONECTOMY Right 12/4/2018    Procedure: " 5TH METATARSAL BONE PARTIAL RESECTION, FULL THICKNESS DEBRIDEMENT OF DIABETIC ULCER;  Surgeon: Shala Flynn DPM;  [facility];  Service: Podiatry    CLOSURE DELAYED PRIMARY Left 10/30/2023    Procedure: CLOSURE DELAYED PRIMARY left foot;  Surgeon: Minh Urena DPM;  [facility];  Service: Podiatry    CLUB FOOT RELEASE Bilateral     COLONOSCOPY      COMPLEX WOUND CLOSURE TO EXTREMITY  4/27/2021    Procedure: COMPLEX WOUND CLOSURE TO EXTREMITY: RIGHT FOOT;  Surgeon: Niraj Bojorquez MD;  [facility];  Service: Plastics    EXTERNAL FIXATOR APPLICATION Right 2/12/2021    Procedure: Application multiplane external fixation;  Surgeon: Harry Zamudio DPM;  [facility];  Service: Podiatry    FOOT HARDWARE REMOVAL Right 2/17/2021    Procedure: REMOVAL EXTERNAL FIXATOR;  Surgeon: Harry Zamudio DPM;  [facility];  Service: Podiatry    FOREIGN BODY REMOVAL Right 4/27/2021    Procedure: REMOVAL FOREIGN BODY EXTREMITY: RIGHT FOOT;  Surgeon: Niraj Bojorquez MD;  [facility];  Service: Plastics    INCISION AND DRAINAGE OF WOUND Right 2/17/2021    Procedure: INCISION AND DRAINAGE (I&D) EXTREMITY;  Surgeon: Harry Zamudio DPM;  [facility];  Service: Podiatry    INCISION AND DRAINAGE OF WOUND Left 10/26/2023    Procedure: INCISION AND DRAINAGE (I&D) EXTREMITY; WASHOUT; REMOVAL OF FOREIGN BODY;  Surgeon: Satnam Ponce DPM;  [facility];  Service: Podiatry    ORIF FOOT FRACTURE Right 3/4/2021    Procedure: VAC PLACEMENT; SCREW FIXATION RIGHT FOOT FUSION;  Surgeon: Harry Zamudio DPM;  [facility];  Service: Podiatry    OH AMPUTATION FOOT TRANSMETARSAL Right 2/22/2021    Procedure: AMPUTATION TRANSMETATARSAL (TMA), REMOVAL NAIL IM T2 ICF HARDWARE;  Surgeon: Harry Zamudio DPM;  [facility];  Service: Podiatry    OH AMPUTATION METATARSAL W/TOE SINGLE Left 11/29/2023    Procedure: LEFT FOURTH RAY RESECTION FOOT;  Surgeon:  Harry Zamudio DPM;  [facility];  Service: Podiatry    AZ ARTHRD MIDTARSL/TARS MLT/TRANSVRS W/OSTEOT Right 2/12/2021    Procedure: foot ARTHRODESIS/FUSION;  Surgeon: Harry Zamudio DPM;  [facility];  Service: Podiatry    AZ DEBRIDEMENT BONE 1ST 20 SQ CM/< Right 12/2/2021    Procedure: DEBRIDEMENT OF TARSAL BONES WITH ANITBIODIC BEADS;  Surgeon: Harry Zamudio DPM;  [facility];  Service: Podiatry    AZ LNGTH/SHRT TENDON LEG/ANKLE 1 TENDON SPX Right 2/12/2021    Procedure: LENGTHEN TIBIAL TENDON, TRANS HEEL CORD;  Surgeon: Harry Zamudio DPM;  [facility];  Service: Podiatry    AZ MUSC MYOCUTANEOUS/FASCIOCUTANEOUS FLAP TRUNK Right 4/12/2021    Procedure: RECONSTRUCTION MICROSURGICAL W/ FREE FLAP;  Surgeon: Niraj Bojorquez MD;  [facility];  Service: Plastics    AZ MUSC MYOCUTANEOUS/FASCIOCUTANEOUS FLAP TRUNK Right 4/12/2021    Procedure: TAKEBACK RECONSTRUCTION MICROSURGICAL W/ FREE FLAP;  Surgeon: Niraj Bojorquez MD;  [facility];  Service: Plastics    AZ MUSC MYOCUTANEOUS/FASCIOCUTANEOUS FLAP TRUNK Right 4/13/2021    Procedure: RECONSTRUCTION MICROSURGICAL W/ FREE FLAP, RE EXPLORATION, MICRO VASCULAR REVISION;  Surgeon: Niraj Bojorquez MD;  [facility];  Service: Plastics    AZ OSTC PRTL EXOSTC/CONDYLC METAR HEAD Right 12/29/2020    Procedure: EXCISION EXOSTOSIS;  Surgeon: Harry Zamudio DPM;  [facility];  Service: Podiatry    AZ SECONDARY CLOSURE SURG WOUND/DEHSN XTNSV/COMP Right 12/29/2020    Procedure: PRIMARY DELAYED CLOSURE;  Surgeon: Harry Zamudio DPM;  [facility];  Service: Podiatry    TOE AMPUTATION Right     partial great toe    TONSILLECTOMY      VAC DRESSING APPLICATION Right 3/1/2021    Procedure: APPLICATION VAC DRESSING EXTREMITY;  Surgeon: Niraj Bojorquez MD;  [facility];  Service: Plastics    VASECTOMY  1992    WOUND DEBRIDEMENT Right 3/1/2021    Procedure:  DEBRIDEMENT LOWER EXTREMITY (WASH OUT);  Surgeon: Niraj Bojorquez MD;  [facility];  Service: Plastics    WOUND DEBRIDEMENT Right 4/7/2021    Procedure: DEBRIDEMENT RIGHT FOOT;  Surgeon: Niraj Bojorquez MD;  [facility];  Service: Plastics    WOUND DEBRIDEMENT Right 4/12/2021    Procedure: DEBRIDEMENT LOWER EXTREMITY (WASH OUT);  Surgeon: Niraj Bojorquez MD;  [facility];  Service: Plastics    WOUND DEBRIDEMENT Right 4/27/2021    Procedure: DEBRIDEMENT LOWER EXTREMITY (WASH OUT): RIGHT FOOT;  Surgeon: Niraj Bojorquez MD;  [facility];  Service: Plastics        ALLERGIES:  Simvastatin and Itraconazole    MEDICATIONS:  Current Outpatient Medications   Medication Sig Dispense Refill    acetaminophen (Mapap Arthritis Pain) 650 mg CR tablet Take 1 tablet (650 mg total) by mouth 2 (two) times a day 60 tablet 1    atorvastatin (LIPITOR) 40 mg tablet Take 1 tablet (40 mg total) by mouth daily 90 tablet 3    Continuous Glucose Sensor (FreeStyle Taylor 3 Sensor) MISC Use 1 each every 14 (fourteen) days 1 each 0    diltiazem (CARDIZEM) 120 MG tablet Take 1 tablet (120 mg total) by mouth 2 (two) times a day 180 tablet 0    Eliquis 5 MG Take 1 tablet (5 mg total) by mouth 2 (two) times a day 60 tablet 11    glucose blood (Accu-Chek Guide) test strip USE DAILY 100 strip 2    Insulin Glargine Solostar (Lantus SoloStar) 100 UNIT/ML SOPN START WITH 15 UNITS DAILY INCREASE S DIRECTED 45 mL 1    insulin lispro (HumaLOG) 100 units/mL injection pen Inject 8 Units under the skin 3 (three) times a day with meals 6 mL 3    Insulin Pen Needle (Easy Comfort Pen Needles) 33G X 5 MM MISC USE AS DIRECTED 100 each 4    Lancets (onetouch ultrasoft) lancets Use as instructed 100 each 3    Lancets (onetouch ultrasoft) lancets Use daily 100 each 3    lisinopril (ZESTRIL) 40 mg tablet Take 1 tablet (40 mg total) by mouth daily 90 tablet 3    metFORMIN (GLUCOPHAGE) 500 mg tablet  Take 1 tablet (500 mg total) by mouth 2 (two) times a day with meals 180 tablet 5    metoprolol succinate (TOPROL-XL) 100 mg 24 hr tablet TAKE 1 TABLET (100 MG TOTAL) BY MOUTH 2 (TWO) TIMES A  tablet 0    mupirocin (BACTROBAN) 2 % ointment Apply topically 3 (three) times a day 15 g 0    semaglutide, 0.25 or 0.5 mg/dose, (Ozempic, 0.25 or 0.5 MG/DOSE,) 2 mg/3 mL injection pen 0.25 mg under the skin every 7 days for 4 doses (28 days), THEN 0.5 mg under the skin every 7 days 3 mL 1    tamsulosin (FLOMAX) 0.4 mg Take 1 capsule (0.4 mg total) by mouth daily with dinner 90 capsule 2     No current facility-administered medications for this visit.       SOCIAL HISTORY:  Social History     Socioeconomic History    Marital status: /Civil Union     Spouse name: None    Number of children: None    Years of education: None    Highest education level: None   Occupational History    None   Tobacco Use    Smoking status: Former     Current packs/day: 0.00     Types: Cigarettes     Quit date: 1988     Years since quittin.8     Passive exposure: Past    Smokeless tobacco: Former    Tobacco comments:     exposure to passive smoke   Vaping Use    Vaping status: Never Used   Substance and Sexual Activity    Alcohol use: Yes     Comment: occasionally    Drug use: Not Currently    Sexual activity: Yes     Partners: Female   Other Topics Concern    None   Social History Narrative    None     Social Determinants of Health     Financial Resource Strain: Not on file   Food Insecurity: No Food Insecurity (2024)    Hunger Vital Sign     Worried About Running Out of Food in the Last Year: Never true     Ran Out of Food in the Last Year: Never true   Transportation Needs: No Transportation Needs (2024)    PRAPARE - Transportation     Lack of Transportation (Medical): No     Lack of Transportation (Non-Medical): No   Physical Activity: Not on file   Stress: Not on file   Social Connections: Not on file   Intimate  Partner Violence: Not on file   Housing Stability: Low Risk  (7/12/2024)    Housing Stability Vital Sign     Unable to Pay for Housing in the Last Year: No     Number of Times Moved in the Last Year: 0     Homeless in the Last Year: No        REVIEW OF SYSTEMS:  GENERAL: No fever or chills  HEART: No chest pain, or palpitation  RESPIRATORY:  No acute SOB or cough  GI: No Nausea, vomit or diarrhea  NEUROLOGIC: No syncope or acute weakness    PHYSICAL EXAM:    /79 (BP Location: Left arm, Patient Position: Sitting, Cuff Size: Standard)   Pulse 59     VASCULAR EXAM  Dorsalis pedis  absent, Posterior tibial artery  +1.  The patient has class findings with skin atrophy, lack of digital hair, and nail dystrophy.  There is +1 lower extremity edema bilaterally.  Venous stasis skin changes noted BLE.    NEUROLOGIC EXAM  Sensation is intact to light touch.  Sensation is absent to 10gm monofilament.  No focal neurologic deficit.          DERMATOLOGIC EXAM:   Wound remains healed left 3rd toe.  No redness or swelling.  No drainage.  No purulence.  HPK right lateral TMA stump.      MUSCULOSKELETAL EXAM:   No acute joint pain, edema, or redness.  No acute musculoskeletal problem.  Patient has deformity including hammertoes.  S/P left 4th ray amp and right TMA.

## 2024-09-19 DIAGNOSIS — E11.40 TYPE 2 DIABETES MELLITUS WITH DIABETIC NEUROPATHY, WITHOUT LONG-TERM CURRENT USE OF INSULIN (HCC): ICD-10-CM

## 2024-09-19 RX ORDER — INSULIN GLARGINE 100 [IU]/ML
30 INJECTION, SOLUTION SUBCUTANEOUS
Qty: 45 ML | Refills: 1 | Status: SHIPPED | OUTPATIENT
Start: 2024-09-19

## 2024-09-19 RX ORDER — INSULIN LISPRO 100 [IU]/ML
16 INJECTION, SOLUTION INTRAVENOUS; SUBCUTANEOUS
Qty: 14.4 ML | Refills: 4 | Status: SHIPPED | OUTPATIENT
Start: 2024-09-19 | End: 2025-01-30

## 2024-09-19 NOTE — TELEPHONE ENCOUNTER
Patient called and stated that his short term insulin has been in creased from 9 unit after every meal to 15*-16 units after every meal.  He does not have enough to get threw the day and the pharmacy needs a new script with the correct doses.  Please send asap      Insulin Glargine Solostar (Lantus SoloStar) 100 UNIT/ML SOPN START WITH 15 UNITS DAILY INCREASE S DIRECTED       insulin lispro (HumaLOG) 100 units/mL injection pen () Inject 8 Units under the skin 3 (three) times a day with meals

## 2024-10-02 ENCOUNTER — CLINICAL SUPPORT (OUTPATIENT)
Dept: FAMILY MEDICINE CLINIC | Facility: CLINIC | Age: 74
End: 2024-10-02

## 2024-10-02 DIAGNOSIS — E11.40 TYPE 2 DIABETES MELLITUS WITH DIABETIC NEUROPATHY, WITHOUT LONG-TERM CURRENT USE OF INSULIN (HCC): Primary | ICD-10-CM

## 2024-10-02 DIAGNOSIS — Z79.899 MEDICATION MANAGEMENT: ICD-10-CM

## 2024-10-02 NOTE — ASSESSMENT & PLAN NOTE
Health Status: Healthy; Goals - A1c: <7-7.5%; Time In Range: >70% with TBR <4% and <1% of time with glucose <54 mg/dL: per ADA Guidelines.  - Most recent A1c: above goal, but is trending down.   - CGM TIR: below goal and TBR meets goal.  TREATMENT REGIMEN: Increase Lantus and maximize metformin to address hyperglycemia. Patient's kidney function is normal and stable. Advised patient to monitor glycose levels closely during physical activity, reduce Humalog before exercise, and have glucose source available.      INTERVENTIONS:   1. Increase Lantus to 35 units SQ HS  2. Increase metformin to 1,000 mg BID  Orders placed under [facility]PA

## 2024-10-02 NOTE — PROGRESS NOTES
[facility] Pharmacy [facility]   Juan Manuel Villalobos, Ailyn     Won Gaitan Jr. is a 74 y.o. male who was referred to the clinical pharmacist by Pieter Torres DO for diabetes. Patient presents via telephone for follow-up.      Telemedicine consent  The patient was identified by name and date of birth. Won Gaitan Jr. was informed that this is a telemedicine visit and that the visit is being conducted through the [employer] Teams platform. He agrees to proceed..  My office door was closed. No one else was in the room.  He acknowledged consent and understanding of privacy and security of the telephone platform. The patient has agreed to participate and understands they can discontinue the visit at any time.  Assessment & Plan  Type 2 diabetes mellitus with diabetic neuropathy, without long-term current use of insulin (HCC)  Health Status: Healthy; Goals - A1c: <7-7.5%; Time In Range: >70% with TBR <4% and <1% of time with glucose <54 mg/dL: per ADA Guidelines.  - Most recent A1c: above goal, but is trending down.   - CGM TIR: below goal and TBR meets goal.  TREATMENT REGIMEN: Increase Lantus and maximize metformin to address hyperglycemia. Patient's kidney function is normal and stable. Advised patient to monitor glycose levels closely during physical activity, reduce Humalog before exercise, and have glucose source available.      INTERVENTIONS:   1. Increase Lantus to 35 units SQ HS  2. Increase metformin to 1,000 mg BID  Orders placed under [facility]    Medication management  Medication Reconciliation: I have reviewed the patient's allergies, medications and history as noted in the electronic medical record and updated as necessary..   - Rx Insurance: Medicare Advantage    Education:  - Counseled patient on diagnostic results, glycemic goals, instruction for self-management, importance of treatment adherence, and risk factor reduction as appropriate. All questions fully answered    - He was given  instructions on how to contact me in the event of adverse drug event, questions, or concerns.    Follow-up:   Follow-up with pharmacist in 4 weeks  Next PCP visit: 1/13/2025    - Home Monitoring Records: CGM data.  - Labs: A1c due on or after 10/10/2024    Subjective:     Won Gaitan Jr. is a 74 y.o. male with a history of type 2 diabetes with long term use of insulin. Diabetes course has been improving with treatment. Denies recent illness or hospitalizations. He denies any polyuria, polydipsia, polyphagia, fatigue and blurry vision. Denies any issues with his current DM regimen.    Patient self-adjusted his insulin to Lantus 32 units SQ daily + Humalog 17 units AC.     Reports one episode of low blood sugar; occurred following increased exercise and yardwork     DM Self-Management:  - Self-monitoring: is performed regularly.  He checks glucose levels very frequently using CGM (> 30 scans/day). He uses smartphone as . Patient is connected to clinic [employer] account.   - Hypoglycemic episodes: one episode with increased activity - History of Level 3/severe hypoglycemic event: none   - Hypoglycemia symptoms: weak, shaky; symptomatic with glucose levels 80-90  - Treatment of hypoglycemia: discussed treatment   Glucometer: Yes, Brand: Accu-Check Guide Me   CGM: Yes, Brand: FreeStyle Taylor 3     Current DM Regimen:  Humalog 17 units SQ AC  Lantus 32 units SQ HS (0.29 units/kg)  Metformin 500 mg BID    DM History:  Microvascular complications: nephropathy and peripheral neuropathy; S/P transmetatarsal amputation of R foot; S/P amputation of L lesser toe   Cardiovascular complications: none  - Statin: Yes   - ACEi/ARB: Yes    Previous DM medications:   Januvia  Glimepiride  Ozempic (cost)    CV Risk Factors:  - HF: No  - HTN: Yes - Obesity: No; Overweight   - CKD: normal GFR with albuminuria - Dyslipidemia: Yes - Smoking: Former     - Thyroid disorder: No  - History of pancreatitis: No    Drug Therapy  Problems:  - Medication Adherence: Responsible for medication management: patient and spouse. Patient is noncompliant much of the time; takes insulin as scheduled, however; has history of self-adjusting insulin doses. Denies any side effects from current medications.    Preventative Care:  Ophthamology: Overdue  Podiatry: Up to date; followed by Podiatry   Kidney Health: Up to date    Objective:   CGM Data Review:  Device: FreeStyle Taylor 3  Dates: 9/19 - 10/2/2024  Usage: 95%  Average glucose (mg/dL): 182  GMI (approx. lab A1c): 7.7%  Glycemic variability (CV): 28.1%     Glycemic patterns: Post-prandial excursions especially in the evening; some hyperglycemia overnight.  Hypoglycemia: none       Metric CGM Target   Time above range (TAR) Very High >250 mg/dL 10 <5%   Time above range (TAR) High >180 mg/dL 38 <25%   Time in range (TIR)  mg/dL 52 >70%   Time below range (TBR) Low <70 mg/dL 0 <4%    Time below range (TBR)Very Low <54 mg/dL 0 <1%       Current Outpatient Medications:     metFORMIN (GLUCOPHAGE) 1000 MG tablet, Take 1 tablet (1,000 mg total) by mouth 2 (two) times a day with meals, Disp: 180 tablet, Rfl: 3    acetaminophen (Mapap Arthritis Pain) 650 mg CR tablet, Take 1 tablet (650 mg total) by mouth 2 (two) times a day, Disp: 60 tablet, Rfl: 1    atorvastatin (LIPITOR) 40 mg tablet, Take 1 tablet (40 mg total) by mouth daily, Disp: 90 tablet, Rfl: 3    Continuous Glucose Sensor (FreeStyle Taylor 3 Sensor) MISC, Use 1 each every 14 (fourteen) days, Disp: 1 each, Rfl: 0    diltiazem (CARDIZEM) 120 MG tablet, Take 1 tablet (120 mg total) by mouth 2 (two) times a day, Disp: 180 tablet, Rfl: 0    Eliquis 5 MG, Take 1 tablet (5 mg total) by mouth 2 (two) times a day, Disp: 60 tablet, Rfl: 11    glucose blood (Accu-Chek Guide) test strip, USE DAILY, Disp: 100 strip, Rfl: 2    Insulin Glargine Solostar (Lantus SoloStar) 100 UNIT/ML SOPN, Inject 0.3 mL (30 Units total) under the skin daily at bedtime, Disp:  45 mL, Rfl: 1    insulin lispro (HumaLOG) 100 units/mL injection pen, Inject 16 Units under the skin 3 (three) times a day with meals, Disp: 14.4 mL, Rfl: 4    Insulin Pen Needle (Easy Comfort Pen Needles) 33G X 5 MM MISC, USE AS DIRECTED, Disp: 100 each, Rfl: 4    Lancets (onetouch ultrasoft) lancets, Use as instructed, Disp: 100 each, Rfl: 3    Lancets (onetouch ultrasoft) lancets, Use daily, Disp: 100 each, Rfl: 3    lisinopril (ZESTRIL) 40 mg tablet, Take 1 tablet (40 mg total) by mouth daily, Disp: 90 tablet, Rfl: 3    metoprolol succinate (TOPROL-XL) 100 mg 24 hr tablet, TAKE 1 TABLET (100 MG TOTAL) BY MOUTH 2 (TWO) TIMES A DAY, Disp: 180 tablet, Rfl: 0    mupirocin (BACTROBAN) 2 % ointment, Apply topically 3 (three) times a day, Disp: 15 g, Rfl: 0    semaglutide, 0.25 or 0.5 mg/dose, (Ozempic, 0.25 or 0.5 MG/DOSE,) 2 mg/3 mL injection pen, 0.25 mg under the skin every 7 days for 4 doses (28 days), THEN 0.5 mg under the skin every 7 days, Disp: 3 mL, Rfl: 1    tamsulosin (FLOMAX) 0.4 mg, Take 1 capsule (0.4 mg total) by mouth daily with dinner, Disp: 90 capsule, Rfl: 2    Allergies   Allergen Reactions    Simvastatin Myalgia    Itraconazole Other (See Comments)     Pt denies knowledge of allergy..      Pertinent Lab Data:    Lab Results   Component Value Date    HGBA1C 8.0 (A) 07/12/2024      Pharmacist Tracking Tool:   Reason For Outreach: Embedded Pharmacist  Demographics:  Intervention Method: Phone  Type of Intervention: Follow-Up  Topics Addressed: Diabetes  Pharmacologic Interventions: Dose or Frequency Adjusted and Prevent or Manage HEAVEN  Non-Pharmacologic Interventions: Personal CGM  Time:  Direct Patient Care:  25  mins  Care Coordination:  15  mins  Recommendation Recipient: Patient/Caregiver  Outcome: Accepted    Juan Manuel Villalobos, PharmD  Clinical Integration Pharmacist  [facility]  [phone]

## 2024-10-08 DIAGNOSIS — L03.116 CELLULITIS OF LEFT LOWER LEG: ICD-10-CM

## 2024-10-09 RX ORDER — DILTIAZEM HYDROCHLORIDE 120 MG/1
TABLET, FILM COATED ORAL
Qty: 180 TABLET | Refills: 1 | Status: SHIPPED | OUTPATIENT
Start: 2024-10-09

## 2024-10-18 NOTE — UTILIZATION REVIEW
Initial Clinical Review    Admission: Date/Time/Statement:   Admission Orders (From admission, onward)     Ordered        02/16/21 1915  INPATIENT ADMISSION  Once                   Orders Placed This Encounter   Procedures    INPATIENT ADMISSION     Standing Status:   Standing     Number of Occurrences:   1     Order Specific Question:   Level of Care     Answer:   Med Surg [16]     Order Specific Question:   Estimated length of stay     Answer:   More than 2 Midnights     Order Specific Question:   Certification     Answer:   I certify that inpatient services are medically necessary for this patient for a duration of greater than two midnights  See H&P and MD Progress Notes for additional information about the patient's course of treatment  ED Arrival Information     Expected Arrival Acuity Means of Arrival Escorted By Service Admission Type    - 2/16/2021 16:30 Urgent Ambulance [facility] EMS Podiatry Urgent    Arrival Complaint    -        Chief Complaint   Patient presents with    Altered Mental Status     Per EMS pt had recent surgery on his right foot  Today pt had sudden onset of AMS and fevers and was diaphoretic and shaking  Assessment/Plan: 79 yo m with a pmh of DM, club foot s/p recent R foot reconstruction surgery 2/12/21  He had a midfoot fusion with external fixator application  Post op he had bleeding through the incision stopped with additional suture placement  He now reports  Fever and chills worsening the past 2 days, and some color changes in toe color  He further reports difficulty moving his toes and increased pain  Which opioids provide little help  He has also not had a BM since discharge  He is admitted inpatient with  R foot abscess, with need for an I & D, IV abx's planned                                     ED Triage Vitals [02/16/21 1637]   Temperature Pulse Respirations Blood Pressure SpO2   (!) 103 °F (39 4 °C) 84 (!) 26 136/81 97 %      Temp Source Heart Rate OB/GYN Consult      NAME:  Zulma Coronado  PCP:  Samanta Narvaez  MRN:  [id number]      Impression / Plan     48 year old  with:      ICD-10-CM    1. Abnormal uterine bleeding (AUB)  N93.9           Imaging and labs reviewed with the patient in the context of her clincal picture.  The fluid collection in the area of the left fallopian tube is nonspecific and most likely an old hydrosalpinx.  Surgical removal may be considered if she starts having significant pain, but would be higher risk for injury due to possible adhesions.  She opts for observation at this time.   Discussed management options for bleeding, including IUD and other hormonal options.  She may also consider lysteda.  Surgical options include ablation and hysterectomy. She prefers observation at this time, but will reach out if she would like a prescription sent in or will schedule an appointment if she opts for an IUD.  Plan to continue the iron supplement and iron rich diet.       Chief Complaint     Chief Complaint   Patient presents with    Abnormal Uterine Bleeding       HPI     Zulma Coronado is a  48 year old female who is seen for abnormal uterine bleeding.   Not sexually active and does not use contraception.  Tried an IUD in the past but had it removed for breakthrough bleeding.  Her partner at the time had a vasectomy so she did not require contraception and     She was seen for her annual and routine blood showed anemia, so US was recommended.  See below.     Patient's last menstrual period was 10/11/2024 (exact date).   Cycles are every 21-22 days.   Last 4-5 days.  2.5 days are heavy.  When it is heavy she changes a super plus tampon every 1.5-2 hours.    This  is not new for her.  She just had cycle that was 47 and 30 days apart.  This is new, however.      Left sided pelvic pain is intermittent, not correlated to periods.  Ovulation might irritate it.  She is not sure how long it has been going on. Hasn't been  "concerning enough to pay attention.     She started taking an iron supplement and feeling better.  She wasn't necessarily feeling poorly prior to starting it, but notices a difference now that she is taking it.     Problem List     Patient Active Problem List    Diagnosis Date Noted    Skin lesion 10/27/2022     Priority: Medium     Added automatically from request for surgery [id number]      Family history of ulcerative colitis 06/10/2022     Priority: Medium    ASCUS with positive high risk HPV cervical 2019     Priority: Medium     2018 ASCUS, + hr HPV  at [facility]  2018 [place]: Negative  At [facility]  Plan: Cotesting 19 NIL pap, + HR HPV (not 16 or 18). Plan: [place]  19 [place] ECC \"cervicitis and epithelial atypia\". Plan: cotest due 20 NIL Pap, Neg HR HPV. Plan: Cotest in 3 years.  2/3/23 NIL Pap, Neg HR HPV. Plan: cotest in 3 years.         Gastroesophageal reflux disease without esophagitis 2019     Priority: Medium       Medications     Current Outpatient Medications   Medication Sig Dispense Refill    famotidine (PEPCID) 20 MG tablet Take 1 tablet (20 mg) by mouth 2 times daily 180 tablet 3    Ferrous Sulfate (IRON PO) Take by mouth.       No current facility-administered medications for this visit.        Allergies     Allergies   Allergen Reactions    No Known Drug Allergy        Past Medical/Surgical History     Past Medical History:   Diagnosis Date    Human papillomavirus in conditions classified elsewhere and of unspecified site 2000    Meningitis, unspecified(322.9) 1980    Meningitis, viral       Past Surgical History:   Procedure Laterality Date     SECTION      COMBINED ESOPHAGOSCOPY, GASTROSCOPY, DUODENOSCOPY (EGD) WITH CO2 INSUFFLATION N/A 2019    Procedure: COMBINED ESOPHAGOSCOPY, GASTROSCOPY, DUODENOSCOPY (EGD) WITH CO2 INSUFFLATION;  Surgeon: Chacorta Nunez MD;  Location:  OR    ESOPHAGOSCOPY, GASTROSCOPY, " Source Patient Position - Orthostatic VS BP Location FiO2 (%)   Oral Monitor Lying Left arm --      Pain Score       3          Wt Readings from Last 1 Encounters:   02/16/21 109 kg (240 lb 15 4 oz)     Additional Vital Signs:   Date/Time  Temp  Pulse  Resp  BP  MAP (mmHg)  SpO2  O2 Device  Patient Position - Orthostatic VS   02/17/21 0744  101 9 °F (38 8 °C)Abnormal   115Abnormal   18  132/69  --  95 %  --  --   02/17/21 0700  101 9 °F (38 8 °C)Abnormal   --  --  --  --  --  --  --   02/17/21 0400  99 °F (37 2 °C)  --  --  --  --  --  --  --   02/17/21 0000  99 °F (37 2 °C)  90  --  116/60  --  --  --  Lying   02/16/21 2200  99 °F (37 2 °C)  99  14  126/68  82  96 %  None (Room air)  Lying   02/16/21 2030  --  120Abnormal   17  129/77  93  96 %  None (Room air)  Lying   02/16/21 2000  --  128Abnormal   18  134/66  97  97 %  None (Room air)  Sitting   02/1952  --  115Abnormal   17  125/62  --  99 %  None (Room air)  Lying   02/16/21 1930  --  128Abnormal   18  125/62  90  98 %  None (Room air)  Sitting   02/16/21 1926  99 9 °F (37 7 °C)  --  --  --  --  --  --  --   02/16/21 1900  --  126Abnormal   18  121/58  70  97 %  None (Room air)  Lying   02/16/21 1730  --  120Abnormal   20  129/59  83  96 %  None (Room air)  Sitting   02/16/21 1700  --  110Abnormal   18  115/65  82  95 %  None (Room air)  Sitting             Pertinent Labs/Diagnostic Test Results:   Results from last 7 days   Lab Units 02/16/21  1703   SARS-COV-2  Negative     Results from last 7 days   Lab Units 02/17/21  0752 02/16/21  1648   WBC Thousand/uL 8 58 14 65*   HEMOGLOBIN g/dL 11 1* 12 3   HEMATOCRIT % 34 6* 37 3   PLATELETS Thousands/uL 178 186   NEUTROS ABS Thousands/µL  --  12 97*         Results from last 7 days   Lab Units 02/17/21  0752 02/16/21  1648   SODIUM mmol/L 139 137   POTASSIUM mmol/L 3 9 3 7   CHLORIDE mmol/L 111* 106   CO2 mmol/L 22 22   ANION GAP mmol/L 6 9   BUN mg/dL 10 12   CREATININE mg/dL 1 02 1 05   EGFR DUODENOSCOPY (EGD), COMBINED N/A 2/1/2019    Procedure: Combined Esophagoscopy, Gastroscopy, Duodenoscopy (Egd), Biopsy Single Or Multiple;  Surgeon: Chacorta Nunez MD;  Location: [facility] OR    EXCISE LESION FACE Left 11/17/2022    Procedure: Left lower lip lesion excision, complex closure,;  Surgeon: Hernandez Arriaga MD;  Location: [facility] OR        Social History     Social History     Socioeconomic History    Marital status: Single     Spouse name: Luis Antonio    Number of children: 0    Years of education: 15    Highest education level: Not on file   Occupational History    Occupation: [employer]     Employer: [employer]      Tobacco Use    Smoking status: Never     Passive exposure: Never    Smokeless tobacco: Never   Vaping Use    Vaping status: Never Used   Substance and Sexual Activity    Alcohol use: Yes     Comment: rarely    Drug use: No    Sexual activity: Not Currently     Partners: Male   Other Topics Concern    Parent/sibling w/ CABG, MI or angioplasty before 65F 55M? No   Social History Narrative    Not on file     Social Determinants of Health     Financial Resource Strain: Low Risk  (7/31/2024)    Financial Resource Strain     Within the past 12 months, have you or your family members you live with been unable to get utilities (heat, electricity) when it was really needed?: No   Food Insecurity: Low Risk  (7/31/2024)    Food Insecurity     Within the past 12 months, did you worry that your food would run out before you got money to buy more?: No     Within the past 12 months, did the food you bought just not last and you didn t have money to get more?: No   Transportation Needs: Low Risk  (7/31/2024)    Transportation Needs     Within the past 12 months, has lack of transportation kept you from medical appointments, getting your medicines, non-medical meetings or appointments, work, or from getting things that you need?: No   Physical Activity: Sufficiently Active (7/31/2024)    Exercise  ml/min/1 73sq m 75 73   CALCIUM mg/dL 8 9 8 8     Results from last 7 days   Lab Units 02/16/21  1648   AST U/L 52*   ALT U/L 61   ALK PHOS U/L 120*   TOTAL PROTEIN g/dL 7 1   ALBUMIN g/dL 2 9*   TOTAL BILIRUBIN mg/dL 0 77     Results from last 7 days   Lab Units 02/17/21  0727 02/16/21  2256 02/13/21  1116 02/13/21  0730 02/13/21  0120 02/12/21  2149 02/12/21  1752 02/12/21  1133   POC GLUCOSE mg/dl 137 202* 202* 184* 225* 180* 154* 193*     Results from last 7 days   Lab Units 02/17/21  0752 02/16/21  1648   GLUCOSE RANDOM mg/dL 144* 175*     Results from last 7 days   Lab Units 02/16/21  1648   TROPONIN I ng/mL <0 02         Results from last 7 days   Lab Units 02/17/21  0752 02/16/21  1648   PROTIME seconds 17 8* 17 9*   INR  1 47* 1 48*   PTT seconds  --  37         Results from last 7 days   Lab Units 02/16/21  1648   PROCALCITONIN ng/ml 1 08*     Results from last 7 days   Lab Units 02/16/21  1855 02/16/21  1648   LACTIC ACID mmol/L 1 2 2 1*     Results from last 7 days   Lab Units 02/16/21  1743   CLARITY UA  Clear   COLOR UA  Yellow   SPEC GRAV UA  1 024   PH UA  6 0   GLUCOSE UA mg/dl >=1000 (1%)*   KETONES UA mg/dl Trace*   BLOOD UA  Trace*   PROTEIN UA mg/dl 100 (2+)*   NITRITE UA  Negative   BILIRUBIN UA  Negative   UROBILINOGEN UA E U /dl 1 0   LEUKOCYTES UA  Elevated glucose may cause decreased leukocyte values  See urine microscopic for [facility] result/*   WBC UA /hpf None Seen   RBC UA /hpf None Seen   BACTERIA UA /hpf None Seen   EPITHELIAL CELLS WET PREP /hpf None Seen   MUCUS THREADS  Occasional*     Results from last 7 days   Lab Units 02/16/21  1703   INFLUENZA A PCR  Negative   INFLUENZA B PCR  Negative   RSV PCR  Negative       Results from last 7 days   Lab Units 02/16/21  1648 02/16/21  1630   BLOOD CULTURE  Received in Microbiology Lab  Culture in Progress  Received in Microbiology Lab  Culture in Progress       XR R foot - 2/17 - Findings suspicious for infection involving the 4th and 5th "Vital Sign     Days of Exercise per Week: 4 days     Minutes of Exercise per Session: 60 min   Stress: Stress Concern Present (7/31/2024)    [place] [employer] Occup[employer] - Occupational Stress Questionnaire     Feeling of Stress : To some extent   Social Connections: Unknown (7/31/2024)    Social Connection and Isolation Panel [NHANES]     Frequency of Communication with Friends and Family: Not on file     Frequency of Social Gatherings with Friends and Family: Once a week     Attends Mandaeism Services: Not on file     Active Member of Clubs or Organizations: Not on file     Attends Club or Organization Meetings: Not on file     Marital Status: Not on file   Interpersonal Safety: Low Risk  (8/1/2024)    Interpersonal Safety     Do you feel physically and emotionally safe where you currently live?: Yes     Within the past 12 months, have you been hit, slapped, kicked or otherwise physically hurt by someone?: Not on file     Within the past 12 months, have you been humiliated or emotionally abused in other ways by your partner or ex-partner?: No   Housing Stability: Low Risk  (7/31/2024)    Housing Stability     Do you have housing? : Yes     Are you worried about losing your housing?: No       Family History      Family History   Problem Relation Age of Onset    Arthritis Mother         fibromyalgia    Crohn's Disease Mother     Ulcerative Colitis Mother     Inflammatory Bowel Disease Mother     Diabetes Mother         Type @    Hypertension Mother     Cerebrovascular Disease Maternal Grandfather         n/a    Diabetes Maternal Grandfather     Inflammatory Bowel Disease Daughter     Hypertension Maternal Grandmother        ROS     Pertinent positives and negatives are listed in the HPI.     Physical Exam   Vitals: /81   Ht 1.6 m (5' 3\")   Wt 70.4 kg (155 lb 4.8 oz)   LMP 10/11/2024 (Exact Date)   BMI 27.51 kg/m      General: Comfortable, no obvious distress    Labs/Imaging       I have " tarsometatarsal joints, less conspicuous previously   Status post surgery    XR R ankle - 2/17 -  Stable [post op appearance   ECG 2/16 -  Afib - tachycardia 120 -  No acute changes     ED Treatment:   Medication Administration from 02/16/2021 1629 to 02/16/2021 2141       Date/Time Order Dose Route Action Comments     02/16/2021 1648 sodium chloride 0 9 % bolus 1,000 mL 1,000 mL Intravenous New Bag      02/16/2021 1656 vancomycin (VANCOCIN) 2,000 mg in sodium chloride 0 9 % 500 mL IVPB 2,000 mg Intravenous New Bag      02/16/2021 1656 cefepime (MAXIPIME) 2 g/50 mL dextrose IVPB 2,000 mg Intravenous New Bag      02/16/2021 1736 metroNIDAZOLE (FLAGYL) IVPB (premix) 500 mg 100 mL 500 mg Intravenous New Bag      02/16/2021 1956 diltiazem (CARDIZEM) tablet 120 mg 120 mg Oral Given      02/16/2021 1956 apixaban (ELIQUIS) tablet 5 mg 5 mg Oral Given      02/16/2021 1951 metroNIDAZOLE (FLAGYL) tablet 500 mg 500 mg Oral Given      02/16/2021 1951 oxyCODONE (ROXICODONE) immediate release tablet 10 mg 10 mg Oral Given      02/16/2021 2056 sodium chloride 0 9 % infusion 75 mL/hr Intravenous New Bag         Past Medical History:   Diagnosis Date    Arthritis     Atrial fibrillation ([facility] [address] )     Diagnosed 11/2018    Benign prostate hyperplasia 04/2002    Cellulitis     right lower leg     Colon polyp 2006    Diabetes mellitus ([address] )     Hearing aid worn     bilat    Hearing loss     90% loss left ear and 40% right ear    History of clubfoot     "since birth"    History of pneumonia     History of TIA (transient ischemic attack) 04/25/2017 11/30/18 pt denies    Hyperlipidemia 5/15/2014    Hypertension     Infectious viral hepatitis     "cant remember type"    Irregular heart beat     Neuropathy     both feet    Osteomyelitis ([address] )     right great toe    Right middle lobe pulmonary nodule 11/6/2018    Seasickness     Teeth missing     Type 2 diabetes mellitus with diabetic neuropathy ([address] ) 11/6/2018    Wears glasses     reading    Wound, open     bottom of right foot     Present on Admission:   Type 2 diabetes mellitus with diabetic neuropathy ([facility] [address] )   Hyperlipidemia   Essential hypertension      Admitting Diagnosis: Preoperative clearance [Z01 818]  Sepsis ([facility] [address] ) [A41 9]  Hyperlipidemia, unspecified hyperlipidemia type [E78 5]  Type 2 diabetes mellitus with diabetic neuropathy, unspecified whether long term insulin use (HCC) [E11 40]  AMS (altered mental status) [R41 82]  Sepsis without acute organ dysfunction, due to unspecified organism [facility]) [A41 9]  Congenital talipes equinovarus, right foot [Q66 01]  Age/Sex: 78 y o  male       Admission Orders:  Scheduled Medications:  atorvastatin, 40 mg, Oral, Daily  cefepime, 2,000 mg, Intravenous, Q8H  chlorhexidine, 15 mL, Swish & Spit, Once  diltiazem, 120 mg, Oral, BID  insulin lispro, 1-6 Units, Subcutaneous, Q6H  [START ON 2/18/2021] lisinopril, 40 mg, Oral, Daily  metroNIDAZOLE, 500 mg, Oral, Q8H  polyethylene glycol, 17 g, Oral, Daily  senna, 1 tablet, Oral, HS      Continuous IV Infusions:  sodium chloride, 75 mL/hr, Intravenous, Continuous      PRN Meds:  acetaminophen, 975 mg, Oral, Q6H PRN - 2/17 x 1  HYDROmorphone, 0 5 mg, Intravenous, Q4H PRN  ondansetron, 4 mg, Intravenous, Q6H PRN  oxyCODONE, 10 mg, Oral, Q4H PRN - 2/16 x 1   oxyCODONE, 5 mg, Oral, Q4H PRN      Nursing Orders - VS -  SCD's to le's - Diet NPO     Network Utilization Review Department  ATTENTION: Please call with any questions or concerns to [phone] and carefully listen to the prompts so that you are directed to the right person  All voicemails are confidential   Killian Pisano all requests for admission clinical reviews, approved or denied determinations and any other requests to dedicated fax number below belonging to the campus where the patient is receiving treatment   List of dedicated fax numbers for the Facilities:  FACILITY NAME UR FAX NUMBER   ADMISSION DENIALS (Administrative/Medical personally reviewed the labs/imaging and findings were:    US 8/19/24:   Uterus 8.4 x 4.9 x 5.9 cm, mildly heterogeneous  Endometrial stripe 12 mm      MRI 9/20/24:  Uterus 8 x 4 x 6 cm  CS scar  Junctional zone unremarkable  Left ovary with simple cyst.  Loculated fluid collection on left, nonspecific, emphysema vs hydrosalpinx.  No associated inflammation or suspicious enhancement  No lymphadenopathy      TSH   Date Value Ref Range Status   08/01/2024 2.30 0.30 - 4.20 uIU/mL Final   06/10/2022 2.29 0.40 - 4.00 mU/L Final      Hemoglobin   Date Value Ref Range Status   08/01/2024 11.5 (L) 11.7 - 15.7 g/dL Final     Prolactin 14 (10/11/21)    35 min spent on the date of the encounter in chart review, patient visit, review of tests, documentation and/or discussion with other providers about the issues documented above.     Katt Rush MD        [facility]) [phone]   [address] (Maternity/NICU/Pediatrics) 261 [facility],[address] [facility] 40 125 [facility]  [phone]   [address]yl[address] Rene [address] ([address] "[address]) [address] [address] [facility] [phone]   [address] [phone]

## 2024-10-23 NOTE — PATIENT INSTRUCTIONS
Scheduled date of colonoscopy (as of today): 9/1/2022  Physician performing colonoscopy: Dr Anna De Souza  Location of colonoscopy:  [facility]  Bowel prep reviewed with patient: Clenpiq  Instructions reviewed with patient by: Alex Barajas  Clearances:  Amber Subjective:   Patient ID:  Aubree Stinson is a 82 y.o. female who presents for evaluation of No chief complaint on file.        81 yo female, came in for ER f/u  PMH HTN HKLD, congenital mental retarded no h/o MI and DM  Recent ER visit for syncope and BP was at 80's in the ER and rx for UTI. Cr 0.8  EKG reviewed by myself today reveals NSR nonspecific STT change  Now taking Amlodipine 5 mg lisnopirl and coreg 6.25 mg bid        No results found for this or any previous visit.     No results found for this or any previous visit.       Past Medical History:   Diagnosis Date    High cholesterol     Hypertension        History reviewed. No pertinent surgical history.    Social History     Tobacco Use    Smoking status: Never    Smokeless tobacco: Never   Substance Use Topics    Alcohol use: Not Currently    Drug use: Never       Family History   Family history unknown: Yes       Review of Systems   Unable to perform ROS: Mental status change       Objective:   Physical Exam  HENT:      Head: Normocephalic.   Eyes:      Pupils: Pupils are equal, round, and reactive to light.   Neck:      Thyroid: No thyromegaly.      Vascular: Normal carotid pulses. No carotid bruit or JVD.   Cardiovascular:      Rate and Rhythm: Normal rate and regular rhythm. No extrasystoles are present.     Chest Wall: PMI is not displaced.      Pulses: Normal pulses.      Heart sounds: Normal heart sounds. No murmur heard.     No gallop. No S3 sounds.   Pulmonary:      Effort: No respiratory distress.      Breath sounds: Normal breath sounds. No stridor.   Abdominal:      General: Bowel sounds are normal.      Palpations: Abdomen is soft.      Tenderness: There is no abdominal tenderness. There is no rebound.   Skin:     Findings: No rash.   Neurological:      Mental Status: She is alert.         Lab Results   Component Value Date    CHOL 178 11/02/2023    CHOL 174 11/02/2022    CHOL 181 04/23/2021     Lab Results   Component Value Date    HDL 64  "11/02/2023    HDL 62 11/02/2022    HDL 64 04/23/2021     Lab Results   Component Value Date    LDLCALC 101 (H) 11/02/2023    LDLCALC 97 11/02/2022    LDLCALC 102 (H) 04/23/2021     Lab Results   Component Value Date    TRIG 71 11/02/2023    TRIG 81 11/02/2022    TRIG 83 04/23/2021     No results found for: "CHOLHDL"    Chemistry        Component Value Date/Time     10/11/2024 1302    K 3.7 10/11/2024 1302     (H) 10/11/2024 1302    CO2 21 (L) 10/11/2024 1302    BUN 20 10/11/2024 1302    CREATININE 0.9 10/11/2024 1302    GLU 66 (L) 10/11/2024 1302        Component Value Date/Time    CALCIUM 8.0 (L) 10/11/2024 1302    ALKPHOS 64 10/11/2024 1302    AST 10 10/11/2024 1302    ALT 9 (L) 10/11/2024 1302    BILITOT 0.3 10/11/2024 1302          Lab Results   Component Value Date    HGBA1C 5.4 11/02/2023     Lab Results   Component Value Date    TSH 1.260 11/02/2023     No results found for: "INR", "PROTIME"  Lab Results   Component Value Date    WBC 5.97 10/11/2024    HGB 10.3 (L) 10/11/2024    HCT 31.1 (L) 10/11/2024    MCV 88 10/11/2024     10/11/2024     BNP  @LABRCNTIP(BNP,BNPTRIAGEBLO)@  CrCl cannot be calculated (Patient's most recent lab result is older than the maximum 7 days allowed.).  No results found in the last 24 hours.  No results found in the last 24 hours.  No results found in the last 24 hours.    Assessment:      1. Primary hypertension    2. Syncope and collapse        Plan:   Primary hypertension  -     CV Ultrasound Bilateral Doppler Carotid; Future  -     Echo; Future    Syncope and collapse  -     Ambulatory referral/consult to Cardiology  -     CV Ultrasound Bilateral Doppler Carotid; Future  -     Echo; Future      Continue Amlodipine acei and coreg for HTN   Echo and carotid US   RTC in 6m         "

## 2024-10-25 ENCOUNTER — OFFICE VISIT (OUTPATIENT)
Dept: PODIATRY | Facility: CLINIC | Age: 74
End: 2024-10-25
Payer: COMMERCIAL

## 2024-10-25 VITALS
BODY MASS INDEX: 28.86 KG/M2 | SYSTOLIC BLOOD PRESSURE: 152 MMHG | DIASTOLIC BLOOD PRESSURE: 84 MMHG | HEIGHT: 76 IN | HEART RATE: 57 BPM | WEIGHT: 237 LBS

## 2024-10-25 DIAGNOSIS — Z89.431 S/P TRANSMETATARSAL AMPUTATION OF FOOT, RIGHT (HCC): ICD-10-CM

## 2024-10-25 DIAGNOSIS — L97.411 MIDFOOT SKIN ULCER, RIGHT, LIMITED TO BREAKDOWN OF SKIN (HCC): Primary | ICD-10-CM

## 2024-10-25 DIAGNOSIS — E11.40 TYPE 2 DIABETES MELLITUS WITH DIABETIC NEUROPATHY, WITHOUT LONG-TERM CURRENT USE OF INSULIN (HCC): ICD-10-CM

## 2024-10-25 PROCEDURE — 99213 OFFICE O/P EST LOW 20 MIN: CPT | Performed by: PODIATRIST

## 2024-10-25 NOTE — PROGRESS NOTES
"   PATIENT:  Won Gaitan Jr.  1950        ASSESSMENT/PLAN:  1. Midfoot skin ulcer, right, limited to breakdown of skin ([facility])        2. S/P transmetatarsal amputation of foot, right ([facility]ly [facility])        3. Type 2 diabetes mellitus with diabetic neuropathy, without long-term current use of insulin (Formerly [facility])                1. Reviewed medical records.  Patient was counseled on the condition and diagnosis.  Educated disease prevention and risks related to diabetes.    2.  He has new wound on right plantar foot.  Instructed local care.  Off-loading with surgical shoe and knee scooter.    3. Stressed on pt compliance about proper off-loading.  4. RA in 2 weeks.      HPI:  Won Gaitan Jr. is a 74 y.o.year old male seen for foot evaluation.  He has new wound in right plantar foot for a week.  It started after his trip.  He was on his feet quite a bit.  No redness.  No pain.  BS under control.         PAST MEDICAL HISTORY:  Past Medical History:   Diagnosis Date    Arthritis     Atrial fibrillation (HCC)     Diagnosed 11/2018    Benign prostate hyperplasia 04/2002    Cellulitis     right lower leg     Colon polyp 2006    Diabetes mellitus ([facility])     Hearing aid worn     bilat    Hearing loss     90% loss left ear and 40% right ear    History of clubfoot     \"since birth\"    History of pneumonia     History of TIA (transient ischemic attack) 04/25/2017 11/30/18 pt denies    Hyperlipidemia 5/15/2014    Hypertension     Infectious viral hepatitis     \"cant remember type\"    Irregular heart beat     Afib    Neuropathy     both feet    Osteomyelitis (HCC)     right great toe    Pneumonia     Right middle lobe pulmonary nodule 11/6/2018    Seasickness     Teeth missing     Type 2 diabetes mellitus with diabetic neuropathy ([facility]) 11/6/2018    Wears glasses     reading    Wound, open     bottom of right foot       PAST SURGICAL HISTORY:  Past Surgical History:   Procedure Laterality Date    BUNIONECTOMY Right 12/4/2018    Procedure: 5TH " METATARSAL BONE PARTIAL RESECTION, FULL THICKNESS DEBRIDEMENT OF DIABETIC ULCER;  Surgeon: Shala Flynn DPM;  [facility];  Service: Podiatry    CLOSURE DELAYED PRIMARY Left 10/30/2023    Procedure: CLOSURE DELAYED PRIMARY left foot;  Surgeon: Minh Urena DPM;  [facility];  Service: Podiatry    CLUB FOOT RELEASE Bilateral     COLONOSCOPY      COMPLEX WOUND CLOSURE TO EXTREMITY  4/27/2021    Procedure: COMPLEX WOUND CLOSURE TO EXTREMITY: RIGHT FOOT;  Surgeon: Niraj Bojorquez MD;  [facility];  Service: Plastics    EXTERNAL FIXATOR APPLICATION Right 2/12/2021    Procedure: Application multiplane external fixation;  Surgeon: Harry Zamudio DPM;  [facility];  Service: Podiatry    FOOT HARDWARE REMOVAL Right 2/17/2021    Procedure: REMOVAL EXTERNAL FIXATOR;  Surgeon: Harry Zamudio DPM;  [facility];  Service: Podiatry    FOREIGN BODY REMOVAL Right 4/27/2021    Procedure: REMOVAL FOREIGN BODY EXTREMITY: RIGHT FOOT;  Surgeon: Niraj Bojorquez MD;  [facility];  Service: Plastics    INCISION AND DRAINAGE OF WOUND Right 2/17/2021    Procedure: INCISION AND DRAINAGE (I&D) EXTREMITY;  Surgeon: Harry Zamudio DPM;  [facility];  Service: Podiatry    INCISION AND DRAINAGE OF WOUND Left 10/26/2023    Procedure: INCISION AND DRAINAGE (I&D) EXTREMITY; WASHOUT; REMOVAL OF FOREIGN BODY;  Surgeon: Satnam Ponce DPM;  [facility];  Service: Podiatry    ORIF FOOT FRACTURE Right 3/4/2021    Procedure: VAC PLACEMENT; SCREW FIXATION RIGHT FOOT FUSION;  Surgeon: Harry Zamudio DPM;  [facility];  Service: Podiatry    AZ AMPUTATION FOOT TRANSMETARSAL Right 2/22/2021    Procedure: AMPUTATION TRANSMETATARSAL (TMA), REMOVAL NAIL IM T2 ICF HARDWARE;  Surgeon: Harry Zamudio DPM;  [facility];  Service: Podiatry    AZ AMPUTATION METATARSAL W/TOE SINGLE Left 11/29/2023    Procedure: LEFT FOURTH RAY RESECTION FOOT;  Surgeon:  Harry Zamudio DPM;  [facility];  Service: Podiatry    MI ARTHRD MIDTARSL/TARS MLT/TRANSVRS W/OSTEOT Right 2/12/2021    Procedure: foot ARTHRODESIS/FUSION;  Surgeon: Harry Zamudio DPM;  [facility];  Service: Podiatry    MI DEBRIDEMENT BONE 1ST 20 SQ CM/< Right 12/2/2021    Procedure: DEBRIDEMENT OF TARSAL BONES WITH ANITBIODIC BEADS;  Surgeon: Harry Zamudio DPM;  [facility];  Service: Podiatry    MI LNGTH/SHRT TENDON LEG/ANKLE 1 TENDON SPX Right 2/12/2021    Procedure: LENGTHEN TIBIAL TENDON, TRANS HEEL CORD;  Surgeon: Harry Zamudio DPM;  [facility];  Service: Podiatry    MI MUSC MYOCUTANEOUS/FASCIOCUTANEOUS FLAP TRUNK Right 4/12/2021    Procedure: RECONSTRUCTION MICROSURGICAL W/ FREE FLAP;  Surgeon: Niraj Bojorquez MD;  [facility];  Service: Plastics    MI MUSC MYOCUTANEOUS/FASCIOCUTANEOUS FLAP TRUNK Right 4/12/2021    Procedure: TAKEBACK RECONSTRUCTION MICROSURGICAL W/ FREE FLAP;  Surgeon: Niraj Bojorquez MD;  [facility];  Service: Plastics    MI MUSC MYOCUTANEOUS/FASCIOCUTANEOUS FLAP TRUNK Right 4/13/2021    Procedure: RECONSTRUCTION MICROSURGICAL W/ FREE FLAP, RE EXPLORATION, MICRO VASCULAR REVISION;  Surgeon: Niraj Bojorquez MD;  [facility];  Service: Plastics    MI OSTC PRTL EXOSTC/CONDYLC METAR HEAD Right 12/29/2020    Procedure: EXCISION EXOSTOSIS;  Surgeon: Harry Zamudio DPM;  [facility];  Service: Podiatry    MI SECONDARY CLOSURE SURG WOUND/DEHSN XTNSV/COMP Right 12/29/2020    Procedure: PRIMARY DELAYED CLOSURE;  Surgeon: Harry Zamudio DPM;  [facility];  Service: Podiatry    TOE AMPUTATION Right     partial great toe    TONSILLECTOMY      VAC DRESSING APPLICATION Right 3/1/2021    Procedure: APPLICATION VAC DRESSING EXTREMITY;  Surgeon: Niraj Bojorquez MD;  [facility];  Service: Plastics    VASECTOMY  1992    WOUND DEBRIDEMENT Right 3/1/2021    Procedure:  DEBRIDEMENT LOWER EXTREMITY (WASH OUT);  Surgeon: Niraj Bojorquez MD;  [facility];  Service: Plastics    WOUND DEBRIDEMENT Right 4/7/2021    Procedure: DEBRIDEMENT RIGHT FOOT;  Surgeon: Niraj Bojorquez MD;  [facility];  Service: Plastics    WOUND DEBRIDEMENT Right 4/12/2021    Procedure: DEBRIDEMENT LOWER EXTREMITY (WASH OUT);  Surgeon: Niraj Bojorquez MD;  [facility];  Service: Plastics    WOUND DEBRIDEMENT Right 4/27/2021    Procedure: DEBRIDEMENT LOWER EXTREMITY (WASH OUT): RIGHT FOOT;  Surgeon: Niraj Bojorquez MD;  [facility];  Service: Plastics        ALLERGIES:  Simvastatin and Itraconazole    MEDICATIONS:  Current Outpatient Medications   Medication Sig Dispense Refill    acetaminophen (Mapap Arthritis Pain) 650 mg CR tablet Take 1 tablet (650 mg total) by mouth 2 (two) times a day 60 tablet 1    atorvastatin (LIPITOR) 40 mg tablet Take 1 tablet (40 mg total) by mouth daily 90 tablet 3    Continuous Glucose Sensor (FreeStyle Taylor 3 Sensor) MISC Use 1 each every 14 (fourteen) days 1 each 0    diltiazem (CARDIZEM) 120 MG tablet TAKE 1 TABLET (120 MG TOTAL) BY MOUTH TWICE A  tablet 1    Eliquis 5 MG Take 1 tablet (5 mg total) by mouth 2 (two) times a day 60 tablet 11    glucose blood (Accu-Chek Guide) test strip USE DAILY 100 strip 2    Insulin Glargine Solostar (Lantus SoloStar) 100 UNIT/ML SOPN Inject 0.3 mL (30 Units total) under the skin daily at bedtime 45 mL 1    insulin lispro (HumaLOG) 100 units/mL injection pen Inject 16 Units under the skin 3 (three) times a day with meals 14.4 mL 4    Insulin Pen Needle (Easy Comfort Pen Needles) 33G X 5 MM MISC USE AS DIRECTED 100 each 4    Lancets (onetouch ultrasoft) lancets Use as instructed 100 each 3    Lancets (onetouch ultrasoft) lancets Use daily 100 each 3    lisinopril (ZESTRIL) 40 mg tablet Take 1 tablet (40 mg total) by mouth daily 90 tablet 3    metFORMIN (GLUCOPHAGE)  1000 MG tablet Take 1 tablet (1,000 mg total) by mouth 2 (two) times a day with meals 180 tablet 3    metoprolol succinate (TOPROL-XL) 100 mg 24 hr tablet TAKE 1 TABLET (100 MG TOTAL) BY MOUTH 2 (TWO) TIMES A  tablet 0    mupirocin (BACTROBAN) 2 % ointment Apply topically 3 (three) times a day 15 g 0    semaglutide, 0.25 or 0.5 mg/dose, (Ozempic, 0.25 or 0.5 MG/DOSE,) 2 mg/3 mL injection pen 0.25 mg under the skin every 7 days for 4 doses (28 days), THEN 0.5 mg under the skin every 7 days 3 mL 1    tamsulosin (FLOMAX) 0.4 mg Take 1 capsule (0.4 mg total) by mouth daily with dinner 90 capsule 2     No current facility-administered medications for this visit.       SOCIAL HISTORY:  Social History     Socioeconomic History    Marital status: /Civil Union     Spouse name: None    Number of children: None    Years of education: None    Highest education level: None   Occupational History    None   Tobacco Use    Smoking status: Former     Current packs/day: 0.00     Types: Cigarettes     Quit date: 1988     Years since quittin.9     Passive exposure: Past    Smokeless tobacco: Former    Tobacco comments:     exposure to passive smoke   Vaping Use    Vaping status: Never Used   Substance and Sexual Activity    Alcohol use: Yes     Comment: occasionally    Drug use: Not Currently    Sexual activity: Yes     Partners: Female   Other Topics Concern    None   Social History Narrative    None     Social Determinants of Health     Financial Resource Strain: Not on file   Food Insecurity: No Food Insecurity (2024)    Nursing - Inadequate Food Risk Classification     Worried About Running Out of Food in the Last Year: Never true     Ran Out of Food in the Last Year: Never true     Ran Out of Food in the Last Year: Not on file   Transportation Needs: No Transportation Needs (2024)    PRAPARE - Transportation     Lack of Transportation (Medical): No     Lack of Transportation (Non-Medical): No  "  Physical Activity: Not on file   Stress: Not on file   Social Connections: Not on file   Intimate Partner Violence: Not on file   Housing Stability: Low Risk  (7/12/2024)    Housing Stability Vital Sign     Unable to Pay for Housing in the Last Year: No     Number of Times Moved in the Last Year: 0     Homeless in the Last Year: No        REVIEW OF SYSTEMS:  GENERAL: No fever or chills  HEART: No chest pain, or palpitation  RESPIRATORY:  No acute SOB or cough  GI: No Nausea, vomit or diarrhea  NEUROLOGIC: No syncope or acute weakness    PHYSICAL EXAM:    /84   Pulse 57   Ht 6' 4\" (1.93 m)   Wt 108 kg (237 lb)   BMI 28.85 kg/m²     VASCULAR EXAM  Dorsalis pedis  absent, Posterior tibial artery  +1.  The patient has class findings with skin atrophy, lack of digital hair, and nail dystrophy.  There is +1 lower extremity edema bilaterally.  Venous stasis skin changes noted BLE.    NEUROLOGIC EXAM  Sensation is intact to light touch.  Sensation is absent to 10gm monofilament.  No focal neurologic deficit.          DERMATOLOGIC EXAM:   Partial thick ulcer in right plantar foot.  It is about 1.5 X 1.5 cm.  Minimal depth.  Wound bed is granular.  No deep probing. No redness or swelling. No purulence or signs of infection.      MUSCULOSKELETAL EXAM:   No acute joint pain, edema, or redness.  No acute musculoskeletal problem.  Patient has deformity including hammertoes.  S/P left 4th ray amp and right TMA.          "

## 2024-10-30 ENCOUNTER — CLINICAL SUPPORT (OUTPATIENT)
Dept: FAMILY MEDICINE CLINIC | Facility: CLINIC | Age: 74
End: 2024-10-30

## 2024-10-30 DIAGNOSIS — Z79.899 MEDICATION MANAGEMENT: ICD-10-CM

## 2024-10-30 DIAGNOSIS — E11.40 TYPE 2 DIABETES MELLITUS WITH DIABETIC NEUROPATHY, WITHOUT LONG-TERM CURRENT USE OF INSULIN (HCC): Primary | ICD-10-CM

## 2024-11-02 PROBLEM — Z79.899 MEDICATION MANAGEMENT: Status: ACTIVE | Noted: 2024-11-02

## 2024-11-02 PROBLEM — E11.40 TYPE 2 DIABETES MELLITUS WITH DIABETIC NEUROPATHY, WITHOUT LONG-TERM CURRENT USE OF INSULIN (HCC): Status: RESOLVED | Noted: 2024-03-05 | Resolved: 2024-11-02

## 2024-11-02 RX ORDER — INSULIN LISPRO 100 [IU]/ML
INJECTION, SOLUTION INTRAVENOUS; SUBCUTANEOUS
Qty: 15 ML | Refills: 5 | Status: SHIPPED | OUTPATIENT
Start: 2024-11-02

## 2024-11-02 RX ORDER — INSULIN GLARGINE 100 [IU]/ML
35 INJECTION, SOLUTION SUBCUTANEOUS
Qty: 45 ML | Refills: 1 | Status: SHIPPED | OUTPATIENT
Start: 2024-11-02

## 2024-11-02 RX ORDER — METFORMIN HYDROCHLORIDE 750 MG/1
750 TABLET, EXTENDED RELEASE ORAL 2 TIMES DAILY WITH MEALS
Qty: 180 TABLET | Refills: 3
Start: 2024-11-02

## 2024-11-02 NOTE — ASSESSMENT & PLAN NOTE
- Most recent A1c: above goal, but is trending down.   - CGM TIR: no change as patient did not adjust insulin regimen.  Increase Lantus will goal to improve TIR. Will monitor for hypoglycemia. May need further fine tuning of Humalog based on patient's meal intake and activity level.        INTERVENTIONS:   1. Increase Lantus to 35 units SQ HS  2. Increase Humalog to 17 units SQ AC  3. Continue metformin  mg BID

## 2024-11-02 NOTE — PROGRESS NOTES
[facility] Pharmacy [facility]   Juan Manuel Villalobos, TamikoD     Won Gaitan Jr. is a 74 y.o. male who was referred to the clinical pharmacist by Pieter Torres DO for diabetes. Patient presents via telephone for follow-up.      Telemedicine consent  The patient was identified by name and date of birth. Won Gaitan Jr. was informed that this is a telemedicine visit and that the visit is being conducted through the [employer] Teams platform. He agrees to proceed..  My office door was closed. The patient was notified the following individuals were present in the room: Tamiko GuajardoD student.   He acknowledged consent and understanding of privacy and security of the telephone platform. The patient has agreed to participate and understands they can discontinue the visit at any time.  Assessment & Plan  Medication management    Diabetes:- Most recent A1c: above goal, but is trending down.   - CGM TIR: no change as patient did not adjust insulin regimen.  Increase Lantus will goal to improve TIR. Will monitor for hypoglycemia. May need further fine tuning of Humalog based on patient's meal intake and activity level.        INTERVENTIONS:   1. Increase Lantus to 35 units SQ HS  2. Increase Humalog to 17 units SQ AC  3. Continue metformin  mg BID    Follow-up:   Follow-up with pharmacist in 4 weeks  Next PCP visit: 1/13/2025    - Home Monitoring Records: CGM data.  - Labs: A1c due now       Subjective:    Won Gaitan Jr. is a 74 y.o. male with a history of type 2 diabetes with long term use of insulin. Diabetes course has been improving with treatment. Denies recent illness or hospitalizations. He denies any polyuria, polydipsia, polyphagia, fatigue and blurry vision. Denies any issues with his current DM regimen.    Patient reports he is currently taking Lantus 30 units SQ daily + Humalog 16 units AC.     Reports he felt dizzy and weak while taking metformin 1g BID but he tolerates metformin 750  BID.      DM Self-Management:  - Self-monitoring: is performed regularly.  He checks glucose levels very frequently using CGM (> 30 scans/day). He uses smartphone as . Patient is connected to clinic [employer] account.   - Hypoglycemic episodes: one episode with increased activity - History of Level 3/severe hypoglycemic event: none   - Hypoglycemia symptoms: weak, shaky; symptomatic with glucose levels 80-90  - Treatment of hypoglycemia: discussed treatment   Glucometer: Yes, Brand: Accu-Check Guide Me   CGM: Yes, Brand: FreeStyle Taylor 3     Current DM Regimen:  Humalog 16 units SQ AC  Lantus 30 units SQ HS (0.29 units/kg)  Metformin 750 mg BID    DM History:  Microvascular complications: nephropathy and peripheral neuropathy; S/P transmetatarsal amputation of R foot; S/P amputation of L lesser toe   Cardiovascular complications: none  - Statin: Yes   - ACEi/ARB: Yes    Previous DM medications:   Januvia  Glimepiride  Ozempic (cost)    CV Risk Factors:  - HF: No  - HTN: Yes - Obesity: No; Overweight   - CKD: normal GFR with albuminuria - Dyslipidemia: Yes - Smoking: Former     - Thyroid disorder: No  - History of pancreatitis: No    Drug Therapy Problems:  - Medication Adherence: Responsible for medication management: patient and spouse. Patient is noncompliant much of the time; takes insulin as scheduled, however; has history of self-adjusting insulin doses. Denies any side effects from current medications.    Preventative Care:  Ophthamology: Overdue  Podiatry: Up to date; followed by Podiatry   Kidney Health: Up to date       CGM Data Review:    Device: FreeStyle Taylor 3  Dates: 10/17 - 10/30/2024  Usage: 99%  Average glucose (mg/dL): 176  GMI (approx. lab A1c): 7.5%  Glycemic variability (CV): 28.3%     Glycemic patterns: Post-prandial excursions especially in the evening; some hyperglycemia overnight.  Hypoglycemia: none       Metric CGM Target   Time above range (TAR) Very High >250 mg/dL 8 <5%   Time  above range (TAR) High >180 mg/dL 39 <25%   Time in range (TIR)  mg/dL 53 >70%   Time below range (TBR) Low <70 mg/dL 0 <4%    Time below range (TBR)Very Low <54 mg/dL 0 <1%     More than 72 hours of data was reviewed. Report to be scanned to chart.        Objective     Current Outpatient Medications:     acetaminophen (Mapap Arthritis Pain) 650 mg CR tablet, Take 1 tablet (650 mg total) by mouth 2 (two) times a day, Disp: 60 tablet, Rfl: 1    atorvastatin (LIPITOR) 40 mg tablet, Take 1 tablet (40 mg total) by mouth daily, Disp: 90 tablet, Rfl: 3    Continuous Glucose Sensor (FreeStyle Taylor 3 Sensor) MISC, Use 1 each every 14 (fourteen) days, Disp: 1 each, Rfl: 0    diltiazem (CARDIZEM) 120 MG tablet, TAKE 1 TABLET (120 MG TOTAL) BY MOUTH TWICE A DAY, Disp: 180 tablet, Rfl: 1    Eliquis 5 MG, Take 1 tablet (5 mg total) by mouth 2 (two) times a day, Disp: 60 tablet, Rfl: 11    glucose blood (Accu-Chek Guide) test strip, USE DAILY, Disp: 100 strip, Rfl: 2    Insulin Glargine Solostar (Lantus SoloStar) 100 UNIT/ML SOPN, Inject 0.3 mL (30 Units total) under the skin daily at bedtime, Disp: 45 mL, Rfl: 1    insulin lispro (HumaLOG) 100 units/mL injection pen, Inject 16 Units under the skin 3 (three) times a day with meals, Disp: 14.4 mL, Rfl: 4    Insulin Pen Needle (Easy Comfort Pen Needles) 33G X 5 MM MISC, USE AS DIRECTED, Disp: 100 each, Rfl: 4    Lancets (onetouch ultrasoft) lancets, Use as instructed, Disp: 100 each, Rfl: 3    Lancets (onetouch ultrasoft) lancets, Use daily, Disp: 100 each, Rfl: 3    lisinopril (ZESTRIL) 40 mg tablet, Take 1 tablet (40 mg total) by mouth daily, Disp: 90 tablet, Rfl: 3    metFORMIN (GLUCOPHAGE) 1000 MG tablet, Take 1 tablet (1,000 mg total) by mouth 2 (two) times a day with meals, Disp: 180 tablet, Rfl: 3    metoprolol succinate (TOPROL-XL) 100 mg 24 hr tablet, TAKE 1 TABLET (100 MG TOTAL) BY MOUTH 2 (TWO) TIMES A DAY, Disp: 180 tablet, Rfl: 0    mupirocin (BACTROBAN) 2 %  ointment, Apply topically 3 (three) times a day, Disp: 15 g, Rfl: 0    semaglutide, 0.25 or 0.5 mg/dose, (Ozempic, 0.25 or 0.5 MG/DOSE,) 2 mg/3 mL injection pen, 0.25 mg under the skin every 7 days for 4 doses (28 days), THEN 0.5 mg under the skin every 7 days, Disp: 3 mL, Rfl: 1    tamsulosin (FLOMAX) 0.4 mg, Take 1 capsule (0.4 mg total) by mouth daily with dinner, Disp: 90 capsule, Rfl: 2    Allergies   Allergen Reactions    Simvastatin Myalgia    Itraconazole Other (See Comments)     Pt denies knowledge of allergy..      Pertinent Lab Data:    Lab Results   Component Value Date    HGBA1C 8.0 (A) 07/12/2024      Administrative Statements   Pharmacist Tracking Tool:   Reason For Outreach: Embedded Pharmacist  Demographics:  Intervention Method: Phone  Type of Intervention: Follow-Up  Topics Addressed: Diabetes  Pharmacologic Interventions: Dose or Frequency Adjusted  Non-Pharmacologic Interventions: Personal CGM  Time:  Direct Patient Care:  25  mins  Care Coordination:  15  mins  Recommendation Recipient: Patient/Caregiver  Outcome: Accepted    Juan Manuel Villalobos, PharmD  Clinical Integration Pharmacist  [facility]

## 2024-11-08 ENCOUNTER — OFFICE VISIT (OUTPATIENT)
Dept: PODIATRY | Facility: CLINIC | Age: 74
End: 2024-11-08
Payer: COMMERCIAL

## 2024-11-08 VITALS — BODY MASS INDEX: 28.86 KG/M2 | HEIGHT: 76 IN | WEIGHT: 237 LBS

## 2024-11-08 DIAGNOSIS — E11.40 TYPE 2 DIABETES MELLITUS WITH DIABETIC NEUROPATHY, WITHOUT LONG-TERM CURRENT USE OF INSULIN (HCC): ICD-10-CM

## 2024-11-08 DIAGNOSIS — Z89.431 S/P TRANSMETATARSAL AMPUTATION OF FOOT, RIGHT (HCC): ICD-10-CM

## 2024-11-08 DIAGNOSIS — L97.411 MIDFOOT SKIN ULCER, RIGHT, LIMITED TO BREAKDOWN OF SKIN (HCC): Primary | ICD-10-CM

## 2024-11-08 PROCEDURE — 99213 OFFICE O/P EST LOW 20 MIN: CPT | Performed by: PODIATRIST

## 2024-11-08 NOTE — PROGRESS NOTES
"   PATIENT:  Won Gaitan Jr.  1950        ASSESSMENT/PLAN:  1. Midfoot skin ulcer, right, limited to breakdown of skin ([facility])        2. S/P transmetatarsal amputation of foot, right ([facility])        3. Type 2 diabetes mellitus with diabetic neuropathy, without long-term current use of insulin ([facility])                1. Reviewed medical records.  Patient was counseled on the condition and diagnosis.  Educated disease prevention and risks related to diabetes.    2.  Wound is stable and smaller.  Continue local care.  Use Acticoat.  Off-loading with surgical shoe and knee scooter.    3. Stressed on pt compliance about proper off-loading.  4. RA in 2 weeks.      HPI:  Won Gaitan Jr. is a 74 y.o.year old male seen for wound care.  He feels well.  Wound looks stable.  No redness or pain in right foot.  BS under control.         PAST MEDICAL HISTORY:  Past Medical History:   Diagnosis Date    Arthritis     Atrial fibrillation ([facility])     Diagnosed 11/2018    Benign prostate hyperplasia 04/2002    Cellulitis     right lower leg     Colon polyp 2006    Diabetes mellitus ([facility])     Hearing aid worn     bilat    Hearing loss     90% loss left ear and 40% right ear    History of clubfoot     \"since birth\"    History of pneumonia     History of TIA (transient ischemic attack) 04/25/2017 11/30/18 pt denies    Hyperlipidemia 5/15/2014    Hypertension     Infectious viral hepatitis     \"cant remember type\"    Irregular heart beat     Afib    Neuropathy     both feet    Osteomyelitis (HCC)     right great toe    Pneumonia     Right middle lobe pulmonary nodule 11/6/2018    Seasickness     Teeth missing     Type 2 diabetes mellitus with diabetic neuropathy ([facility]) 11/6/2018    Wears glasses     reading    Wound, open     bottom of right foot       PAST SURGICAL HISTORY:  Past Surgical History:   Procedure Laterality Date    BUNIONECTOMY Right 12/4/2018    Procedure: 5TH METATARSAL BONE PARTIAL RESECTION, FULL THICKNESS DEBRIDEMENT OF " DIABETIC ULCER;  Surgeon: Shala Flynn DPM;  [facility];  Service: Podiatry    CLOSURE DELAYED PRIMARY Left 10/30/2023    Procedure: CLOSURE DELAYED PRIMARY left foot;  Surgeon: Minh Urena DPM;  [facility];  Service: Podiatry    CLUB FOOT RELEASE Bilateral     COLONOSCOPY      COMPLEX WOUND CLOSURE TO EXTREMITY  4/27/2021    Procedure: COMPLEX WOUND CLOSURE TO EXTREMITY: RIGHT FOOT;  Surgeon: Niraj Bojorquez MD;  [facility];  Service: Plastics    EXTERNAL FIXATOR APPLICATION Right 2/12/2021    Procedure: Application multiplane external fixation;  Surgeon: Harry Zamudio DPM;  [facility];  Service: Podiatry    FOOT HARDWARE REMOVAL Right 2/17/2021    Procedure: REMOVAL EXTERNAL FIXATOR;  Surgeon: Harry Zamudio DPM;  [facility];  Service: Podiatry    FOREIGN BODY REMOVAL Right 4/27/2021    Procedure: REMOVAL FOREIGN BODY EXTREMITY: RIGHT FOOT;  Surgeon: Niraj Bojorquez MD;  [facility];  Service: Plastics    INCISION AND DRAINAGE OF WOUND Right 2/17/2021    Procedure: INCISION AND DRAINAGE (I&D) EXTREMITY;  Surgeon: Harry Zamudio DPM;  [facility];  Service: Podiatry    INCISION AND DRAINAGE OF WOUND Left 10/26/2023    Procedure: INCISION AND DRAINAGE (I&D) EXTREMITY; WASHOUT; REMOVAL OF FOREIGN BODY;  Surgeon: Satnam Ponce DPM;  [facility];  Service: Podiatry    ORIF FOOT FRACTURE Right 3/4/2021    Procedure: VAC PLACEMENT; SCREW FIXATION RIGHT FOOT FUSION;  Surgeon: Harry Zamudio DPM;  [facility];  Service: Podiatry    MT AMPUTATION FOOT TRANSMETARSAL Right 2/22/2021    Procedure: AMPUTATION TRANSMETATARSAL (TMA), REMOVAL NAIL IM T2 ICF HARDWARE;  Surgeon: Harry Zamudio DPM;  [facility];  Service: Podiatry    MT AMPUTATION METATARSAL W/TOE SINGLE Left 11/29/2023    Procedure: LEFT FOURTH RAY RESECTION FOOT;  Surgeon: Harry Zamudio DPM;  [facility];  Service: Podiatry    MT  ARTHRD MIDTARSL/TARS MLT/TRANSVRS W/OSTEOT Right 2/12/2021    Procedure: foot ARTHRODESIS/FUSION;  Surgeon: Harry Zamudio DPM;  [facility];  Service: Podiatry    WY DEBRIDEMENT BONE 1ST 20 SQ CM/< Right 12/2/2021    Procedure: DEBRIDEMENT OF TARSAL BONES WITH ANITBIODIC BEADS;  Surgeon: Harry Zamudio DPM;  [facility];  Service: Podiatry    WY LNGTH/SHRT TENDON LEG/ANKLE 1 TENDON SPX Right 2/12/2021    Procedure: LENGTHEN TIBIAL TENDON, TRANS HEEL CORD;  Surgeon: Harry Zamudio DPM;  [facility];  Service: Podiatry    WY MUSC MYOCUTANEOUS/FASCIOCUTANEOUS FLAP TRUNK Right 4/12/2021    Procedure: RECONSTRUCTION MICROSURGICAL W/ FREE FLAP;  Surgeon: Niraj Bojorquez MD;  [facility];  Service: Plastics    WY MUSC MYOCUTANEOUS/FASCIOCUTANEOUS FLAP TRUNK Right 4/12/2021    Procedure: TAKEBACK RECONSTRUCTION MICROSURGICAL W/ FREE FLAP;  Surgeon: Niraj Bojorquez MD;  [facility];  Service: Plastics    WY MUSC MYOCUTANEOUS/FASCIOCUTANEOUS FLAP TRUNK Right 4/13/2021    Procedure: RECONSTRUCTION MICROSURGICAL W/ FREE FLAP, RE EXPLORATION, MICRO VASCULAR REVISION;  Surgeon: Niraj Bojorquez MD;  [facility];  Service: Plastics    WY OSTC PRTL EXOSTC/CONDYLC METAR HEAD Right 12/29/2020    Procedure: EXCISION EXOSTOSIS;  Surgeon: Harry Zamudio DPM;  [facility];  Service: Podiatry    WY SECONDARY CLOSURE SURG WOUND/DEHSN XTNSV/COMP Right 12/29/2020    Procedure: PRIMARY DELAYED CLOSURE;  Surgeon: Harry Zamudio DPM;  [facility];  Service: Podiatry    TOE AMPUTATION Right     partial great toe    TONSILLECTOMY      VAC DRESSING APPLICATION Right 3/1/2021    Procedure: APPLICATION VAC DRESSING EXTREMITY;  Surgeon: Niraj Bojorquez MD;  [facility];  Service: Plastics    VASECTOMY  1992    WOUND DEBRIDEMENT Right 3/1/2021    Procedure: DEBRIDEMENT LOWER EXTREMITY (WASH OUT);  Surgeon: Niraj Mccormick  MD Maryellen;  [facility];  Service: Plastics    WOUND DEBRIDEMENT Right 4/7/2021    Procedure: DEBRIDEMENT RIGHT FOOT;  Surgeon: Niraj Bojorquez MD;  [facility];  Service: Plastics    WOUND DEBRIDEMENT Right 4/12/2021    Procedure: DEBRIDEMENT LOWER EXTREMITY (WASH OUT);  Surgeon: Niraj Bojorquez MD;  [facility];  Service: Plastics    WOUND DEBRIDEMENT Right 4/27/2021    Procedure: DEBRIDEMENT LOWER EXTREMITY (WASH OUT): RIGHT FOOT;  Surgeon: Niraj Bojorquez MD;  [facility];  Service: Plastics        ALLERGIES:  Simvastatin and Itraconazole    MEDICATIONS:  Current Outpatient Medications   Medication Sig Dispense Refill    acetaminophen (Mapap Arthritis Pain) 650 mg CR tablet Take 1 tablet (650 mg total) by mouth 2 (two) times a day 60 tablet 1    atorvastatin (LIPITOR) 40 mg tablet Take 1 tablet (40 mg total) by mouth daily 90 tablet 3    Continuous Glucose Sensor (FreeStyle Taylor 3 Sensor) MISC Use 1 each every 14 (fourteen) days 1 each 0    diltiazem (CARDIZEM) 120 MG tablet TAKE 1 TABLET (120 MG TOTAL) BY MOUTH TWICE A  tablet 1    Eliquis 5 MG Take 1 tablet (5 mg total) by mouth 2 (two) times a day 60 tablet 11    glucose blood (Accu-Chek Guide) test strip USE DAILY 100 strip 2    Insulin Glargine Solostar (Lantus SoloStar) 100 UNIT/ML SOPN Inject 0.35 mL (35 Units total) under the skin daily at bedtime 45 mL 1    insulin lispro (HumaLOG) 100 units/mL injection pen Inject up to 20 Units under the skin 3 (three) times a day with meals 15 mL 5    Insulin Pen Needle (Easy Comfort Pen Needles) 33G X 5 MM MISC USE AS DIRECTED 100 each 4    Lancets (onetouch ultrasoft) lancets Use as instructed 100 each 3    Lancets (onetouch ultrasoft) lancets Use daily 100 each 3    lisinopril (ZESTRIL) 40 mg tablet Take 1 tablet (40 mg total) by mouth daily 90 tablet 3    metFORMIN (GLUCOPHAGE-XR) 750 mg 24 hr tablet Take 1 tablet (750 mg total) by mouth 2  (two) times a day with meals 180 tablet 3    metoprolol succinate (TOPROL-XL) 100 mg 24 hr tablet TAKE 1 TABLET (100 MG TOTAL) BY MOUTH 2 (TWO) TIMES A  tablet 0    mupirocin (BACTROBAN) 2 % ointment Apply topically 3 (three) times a day 15 g 0    tamsulosin (FLOMAX) 0.4 mg Take 1 capsule (0.4 mg total) by mouth daily with dinner 90 capsule 2     No current facility-administered medications for this visit.       SOCIAL HISTORY:  Social History     Socioeconomic History    Marital status: /Civil Union     Spouse name: None    Number of children: None    Years of education: None    Highest education level: None   Occupational History    None   Tobacco Use    Smoking status: Former     Current packs/day: 0.00     Types: Cigarettes     Quit date: 1988     Years since quittin.0     Passive exposure: Past    Smokeless tobacco: Former    Tobacco comments:     exposure to passive smoke   Vaping Use    Vaping status: Never Used   Substance and Sexual Activity    Alcohol use: Yes     Comment: occasionally    Drug use: Not Currently    Sexual activity: Yes     Partners: Female   Other Topics Concern    None   Social History Narrative    None     Social Determinants of Health     Financial Resource Strain: Not on file   Food Insecurity: No Food Insecurity (2024)    Nursing - Inadequate Food Risk Classification     Worried About Running Out of Food in the Last Year: Never true     Ran Out of Food in the Last Year: Never true     Ran Out of Food in the Last Year: Not on file   Transportation Needs: No Transportation Needs (2024)    PRAPARE - Transportation     Lack of Transportation (Medical): No     Lack of Transportation (Non-Medical): No   Physical Activity: Not on file   Stress: Not on file   Social Connections: Not on file   Intimate Partner Violence: Not on file   Housing Stability: Low Risk  (2024)    Housing Stability Vital Sign     Unable to Pay for Housing in the Last Year: No      "Number of Times Moved in the Last Year: 0     Homeless in the Last Year: No        REVIEW OF SYSTEMS:  GENERAL: No fever or chills  HEART: No chest pain, or palpitation  RESPIRATORY:  No acute SOB or cough  GI: No Nausea, vomit or diarrhea  NEUROLOGIC: No syncope or acute weakness    PHYSICAL EXAM:    Ht 6' 4\" (1.93 m)   Wt 108 kg (237 lb)   BMI 28.85 kg/m²     VASCULAR EXAM  Dorsalis pedis  absent, Posterior tibial artery  +1.  The patient has class findings with skin atrophy, lack of digital hair, and nail dystrophy.  There is +1 lower extremity edema bilaterally.  Venous stasis skin changes noted BLE.    NEUROLOGIC EXAM  Sensation is intact to light touch.  Sensation is absent to 10gm monofilament.  No focal neurologic deficit.          DERMATOLOGIC EXAM:   Partial thick ulcer in right plantar foot.  It is about 1.1 X 1.0 cm.  Minimal depth.  Wound bed is granular.  No deep probing. No redness or swelling. No purulence or signs of infection.      MUSCULOSKELETAL EXAM:   No acute joint pain, edema, or redness.  No acute musculoskeletal problem.  Patient has deformity including hammertoes.  S/P left 4th ray amp and right TMA.          "

## 2024-11-18 ENCOUNTER — OFFICE VISIT (OUTPATIENT)
Dept: FAMILY MEDICINE CLINIC | Facility: CLINIC | Age: 74
End: 2024-11-18
Payer: COMMERCIAL

## 2024-11-18 ENCOUNTER — CLINICAL SUPPORT (OUTPATIENT)
Dept: FAMILY MEDICINE CLINIC | Facility: CLINIC | Age: 74
End: 2024-11-18

## 2024-11-18 VITALS
OXYGEN SATURATION: 97 % | HEIGHT: 76 IN | SYSTOLIC BLOOD PRESSURE: 104 MMHG | DIASTOLIC BLOOD PRESSURE: 60 MMHG | TEMPERATURE: 97.3 F | HEART RATE: 82 BPM | WEIGHT: 242.6 LBS | BODY MASS INDEX: 29.54 KG/M2

## 2024-11-18 DIAGNOSIS — E11.622 TYPE 2 DIABETES MELLITUS WITH OTHER SKIN ULCER (CODE) (HCC): ICD-10-CM

## 2024-11-18 DIAGNOSIS — H11.31 SUBCONJUNCTIVAL HEMORRHAGE OF RIGHT EYE: Primary | ICD-10-CM

## 2024-11-18 DIAGNOSIS — R91.1 RIGHT MIDDLE LOBE PULMONARY NODULE: ICD-10-CM

## 2024-11-18 DIAGNOSIS — E11.40 TYPE 2 DIABETES MELLITUS WITH DIABETIC NEUROPATHY, WITH LONG-TERM CURRENT USE OF INSULIN (HCC): Primary | Chronic | ICD-10-CM

## 2024-11-18 DIAGNOSIS — Z79.4 TYPE 2 DIABETES MELLITUS WITH DIABETIC NEUROPATHY, WITH LONG-TERM CURRENT USE OF INSULIN (HCC): Primary | Chronic | ICD-10-CM

## 2024-11-18 DIAGNOSIS — Z79.899 MEDICATION MANAGEMENT: ICD-10-CM

## 2024-11-18 PROCEDURE — G2211 COMPLEX E/M VISIT ADD ON: HCPCS | Performed by: FAMILY MEDICINE

## 2024-11-18 PROCEDURE — 99214 OFFICE O/P EST MOD 30 MIN: CPT | Performed by: FAMILY MEDICINE

## 2024-11-18 PROCEDURE — PBNCHG PB NO CHARGE PLACEHOLDER: Performed by: PHARMACIST

## 2024-11-18 NOTE — PROGRESS NOTES
Name: Won Gaitan Jr.      : 1950      MRN: [id number]  Encounter Provider: Pieter Torres DO  Encounter Date: 2024   Encounter department: [facility]  :  Assessment & Plan  Subconjunctival hemorrhage of right eye  Bright red, subconj, hemorrhage since Friday. No EOM pain, no drainage. No vision problems.  - wait and monitor. expectant         Type 2 diabetes mellitus with other skin ulcer (CODE) (HCC)    Lab Results   Component Value Date    HGBA1C 8.0 (A) 2024     Continue 750mg XR BID metformin.         Right middle lobe pulmonary nodule  Improved after doxycycline.                History of Present Illness     S/p PNA, treated with doxy.    R sided subconj hemorrhage.    Dm2 seeing katura. Could not tolerate ozempic.    Eye Problem   The right eye is affected. This is a new problem. The current episode started in the past 7 days. The problem occurs constantly. The problem has been gradually improving. There was no injury mechanism. The pain is at a severity of 0/10. The patient is experiencing no pain. There is No known exposure to pink eye. He Does not wear contacts. Associated symptoms include eye redness. Pertinent negatives include no fever or vomiting. He has tried nothing for the symptoms.     Review of Systems   Constitutional:  Negative for chills and fever.   HENT:  Negative for ear pain and sore throat.    Eyes:  Positive for redness. Negative for pain and visual disturbance.   Respiratory:  Negative for cough and shortness of breath.    Cardiovascular:  Negative for chest pain and palpitations.   Gastrointestinal:  Negative for abdominal pain and vomiting.   Genitourinary:  Negative for dysuria and hematuria.   Musculoskeletal:  Negative for arthralgias and back pain.   Skin:  Negative for color change and rash.   Neurological:  Negative for seizures and syncope.   All other systems reviewed and are negative.    Medical History Reviewed by provider this  encounter:  Tobacco  Allergies  Meds  Problems  Med Hx  Surg Hx  Fam Hx     .  Current Outpatient Medications on File Prior to Visit   Medication Sig Dispense Refill    acetaminophen (Mapap Arthritis Pain) 650 mg CR tablet Take 1 tablet (650 mg total) by mouth 2 (two) times a day 60 tablet 1    atorvastatin (LIPITOR) 40 mg tablet Take 1 tablet (40 mg total) by mouth daily 90 tablet 3    Continuous Glucose Sensor (FreeStyle Taylor 3 Sensor) MISC Use 1 each every 14 (fourteen) days 1 each 0    diltiazem (CARDIZEM) 120 MG tablet TAKE 1 TABLET (120 MG TOTAL) BY MOUTH TWICE A  tablet 1    Eliquis 5 MG Take 1 tablet (5 mg total) by mouth 2 (two) times a day 60 tablet 11    glucose blood (Accu-Chek Guide) test strip USE DAILY 100 strip 2    Insulin Glargine Solostar (Lantus SoloStar) 100 UNIT/ML SOPN Inject 0.35 mL (35 Units total) under the skin daily at bedtime 45 mL 1    insulin lispro (HumaLOG) 100 units/mL injection pen Inject up to 20 Units under the skin 3 (three) times a day with meals 15 mL 5    Insulin Pen Needle (Easy Comfort Pen Needles) 33G X 5 MM MISC USE AS DIRECTED 100 each 4    Lancets (onetouch ultrasoft) lancets Use as instructed 100 each 3    Lancets (onetouch ultrasoft) lancets Use daily 100 each 3    lisinopril (ZESTRIL) 40 mg tablet Take 1 tablet (40 mg total) by mouth daily 90 tablet 3    metFORMIN (GLUCOPHAGE-XR) 750 mg 24 hr tablet Take 1 tablet (750 mg total) by mouth 2 (two) times a day with meals 180 tablet 3    metoprolol succinate (TOPROL-XL) 100 mg 24 hr tablet TAKE 1 TABLET (100 MG TOTAL) BY MOUTH 2 (TWO) TIMES A  tablet 0    mupirocin (BACTROBAN) 2 % ointment Apply topically 3 (three) times a day 15 g 0    tamsulosin (FLOMAX) 0.4 mg Take 1 capsule (0.4 mg total) by mouth daily with dinner 90 capsule 2     No current facility-administered medications on file prior to visit.      Social History     Tobacco Use    Smoking status: Former     Current packs/day: 0.00      "Types: Cigarettes     Quit date: 1988     Years since quittin.0     Passive exposure: Past    Smokeless tobacco: Former    Tobacco comments:     exposure to passive smoke   Vaping Use    Vaping status: Never Used   Substance and Sexual Activity    Alcohol use: Yes     Comment: occasionally    Drug use: Not Currently    Sexual activity: Yes     Partners: Female        Objective   /60 (BP Location: Left arm, Patient Position: Sitting, Cuff Size: Large)   Pulse 82   Temp (!) 97.3 °F (36.3 °C) (Temporal)   Ht 6' 4\" (1.93 m)   Wt 110 kg (242 lb 9.6 oz)   SpO2 97%   BMI 29.53 kg/m²      Physical Exam  Vitals reviewed.   Constitutional:       General: He is not in acute distress.     Appearance: Normal appearance. He is well-developed.   HENT:      Head: Normocephalic and atraumatic.   Eyes:      Comments: R eye redness.   Cardiovascular:      Rate and Rhythm: Normal rate and regular rhythm.      Heart sounds: No murmur heard.  Pulmonary:      Effort: Pulmonary effort is normal. No respiratory distress.      Breath sounds: Normal breath sounds.   Abdominal:      Palpations: Abdomen is soft.      Tenderness: There is no abdominal tenderness.   Musculoskeletal:         General: No swelling.      Cervical back: Neck supple.   Skin:     General: Skin is warm and dry.      Capillary Refill: Capillary refill takes less than 2 seconds.   Neurological:      Mental Status: He is alert.   Psychiatric:         Mood and Affect: Mood normal.       Administrative Statements   I have spent a total time of 36 minutes in caring for this patient on the day of the visit/encounter including Diagnostic results, Prognosis, Risks and benefits of tx options, Instructions for management, Counseling / Coordination of care, Documenting in the medical record, Reviewing / ordering tests, medicine, procedures  , and Obtaining or reviewing history  .   "

## 2024-11-18 NOTE — PROGRESS NOTES
[facility] Clinical Integration Pharmacy Services   Juan Manuel Villalobos, TamikoD     Won Gaitan Jr. is a 74 y.o. male who was referred to the clinical pharmacist by Pieter Torres DO for diabetes. Patient presents via telephone for follow-up.      Telemedicine consent  The patient was identified by name and date of birth. Won Gaitan Jr. was informed that this is a telemedicine visit and that the visit is being conducted through the [employer] Teams platform. He agrees to proceed..  My office door was closed. The patient was notified the following individuals were present in the room: Nanci Monk, Tamiko[employer] student.   He acknowledged consent and understanding of privacy and security of the telephone platform. The patient has agreed to participate and understands they can discontinue the visit at any time.  Assessment & Plan  Type 2 diabetes mellitus with diabetic neuropathy, with long-term current use of insulin (HCC)  - Most recent A1c: above goal, but is trending down.   - CGM TIR: below goal but is improving. TBR is at goal and indicates no hypoglycemia  Patient reports decrease in activity and increase in food intake. Recent infection/wound aslo likely contributors. Overnight glucose is relatively stable, so Lantus is likely sufficient at is current dose. Will increase Humalog to address post-prandial hyperglycemia.       INTERVENTIONS:   1. Increase Humalog to 19 units before breakfast, 17 before lunch, 19 before dinner  2. Continue Lantus and metformin    Medication management    Follow-up:   Follow-up with pharmacist in 2 weeks  Next PCP visit: 1/13/2025    - Home Monitoring Records: CGM data.  - Labs: A1c due now       Subjective:    Won Gaitan Jr. is a 74 y.o. male with a history of type 2 diabetes with long term use of insulin. Diabetes course has been improving with treatment. Denies recent illness or hospitalizations. He denies any polyuria, polydipsia, polyphagia, fatigue and blurry vision. Denies any issues  with his current DM regimen.    Patient has new wound on his R planter foot. Being followed closely by Podiatry. He is off-loading with surgical shoe + knee scooter for at least 6 weeks. Reports decrease in activity and has been eating more. 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     Also with recent PNA.     He increased his insulin as directed at last clinic visit.       DM Self-Management:  - Self-monitoring: is performed regularly.  He checks glucose levels very frequently using CGM (> 30 scans/day). He uses smartphone as . Patient is connected to clinic [employer] account.   - Hypoglycemic episodes: none recently - History of Level 3/severe hypoglycemic event: none   - Hypoglycemia symptoms: weak, shaky; symptomatic with glucose levels 80-90  - Treatment of hypoglycemia: discussed treatment   Glucometer: Yes, Brand: Accu-Check Guide Me   CGM: Yes, Brand: FreeStyle Taylor 3     Current DM Regimen:  Humalog 17 units SQ AC  Lantus 35 units SQ HS (0.32 units/kg)  Metformin 750 mg BID    DM History:  Microvascular complications: nephropathy and peripheral neuropathy; S/P transmetatarsal amputation of R foot; S/P amputation of L lesser toe   Cardiovascular complications: none  - Statin: Yes   - ACEi/ARB: Yes    Previous DM medications:   Januvia  Glimepiride  Ozempic (cost)    CV Risk Factors:  - HF: No  - HTN: Yes - Obesity: No; Overweight   - CKD: normal GFR with albuminuria - Dyslipidemia: Yes - Smoking: Former     - Thyroid disorder: No  - History of pancreatitis: No    Drug Therapy Problems:  - Medication Adherence: Responsible for medication management: patient and spouse. Patient is noncompliant some of the time; takes insulin as scheduled, however; has history of self-adjusting insulin doses. Denies any side effects from current medications.    Preventative Care:  Ophthamology: Overdue  Podiatry: Up to date; followed by Podiatry   Kidney Health: Up to date       CGM  Data Review:    Device: FreeStyle Taylor 3  Dates: 11/5 - 11/18/2024  Usage: 94%  Average glucose (mg/dL): 167  GMI (approx. lab A1c): 7.3%  Glycemic variability (CV): 34.9%     Glycemic patterns: Hyperglycemia with post-prandial excursions  Hypoglycemia: none       Metric CGM Target   Time above range (TAR) Very High >250 mg/dL 8 <5%   Time above range (TAR) High >180 mg/dL 30 <25%   Time in range (TIR)  mg/dL 62 >70%   Time below range (TBR) Low <70 mg/dL 0 <4%    Time below range (TBR)Very Low <54 mg/dL 0 <1%     More than 72 hours of data was reviewed. Report to be scanned to chart.        Objective     Current Outpatient Medications:     acetaminophen (Mapap Arthritis Pain) 650 mg CR tablet, Take 1 tablet (650 mg total) by mouth 2 (two) times a day, Disp: 60 tablet, Rfl: 1    atorvastatin (LIPITOR) 40 mg tablet, Take 1 tablet (40 mg total) by mouth daily, Disp: 90 tablet, Rfl: 3    Continuous Glucose Sensor (FreeStyle Taylor 3 Sensor) MISC, Use 1 each every 14 (fourteen) days, Disp: 1 each, Rfl: 0    diltiazem (CARDIZEM) 120 MG tablet, TAKE 1 TABLET (120 MG TOTAL) BY MOUTH TWICE A DAY, Disp: 180 tablet, Rfl: 1    Eliquis 5 MG, Take 1 tablet (5 mg total) by mouth 2 (two) times a day, Disp: 60 tablet, Rfl: 11    glucose blood (Accu-Chek Guide) test strip, USE DAILY, Disp: 100 strip, Rfl: 2    Insulin Glargine Solostar (Lantus SoloStar) 100 UNIT/ML SOPN, Inject 0.35 mL (35 Units total) under the skin daily at bedtime, Disp: 45 mL, Rfl: 1    insulin lispro (HumaLOG) 100 units/mL injection pen, Inject up to 20 Units under the skin 3 (three) times a day with meals, Disp: 15 mL, Rfl: 5    Insulin Pen Needle (Easy Comfort Pen Needles) 33G X 5 MM MISC, USE AS DIRECTED, Disp: 100 each, Rfl: 4    Lancets (onetouch ultrasoft) lancets, Use as instructed, Disp: 100 each, Rfl: 3    Lancets (onetouch ultrasoft) lancets, Use daily, Disp: 100 each, Rfl: 3    lisinopril (ZESTRIL) 40 mg tablet, Take 1 tablet (40 mg total) by  mouth daily, Disp: 90 tablet, Rfl: 3    metFORMIN (GLUCOPHAGE-XR) 750 mg 24 hr tablet, Take 1 tablet (750 mg total) by mouth 2 (two) times a day with meals, Disp: 180 tablet, Rfl: 3    metoprolol succinate (TOPROL-XL) 100 mg 24 hr tablet, TAKE 1 TABLET (100 MG TOTAL) BY MOUTH 2 (TWO) TIMES A DAY, Disp: 180 tablet, Rfl: 0    mupirocin (BACTROBAN) 2 % ointment, Apply topically 3 (three) times a day, Disp: 15 g, Rfl: 0    tamsulosin (FLOMAX) 0.4 mg, Take 1 capsule (0.4 mg total) by mouth daily with dinner, Disp: 90 capsule, Rfl: 2    Allergies   Allergen Reactions    Simvastatin Myalgia    Itraconazole Other (See Comments)     Pt denies knowledge of allergy..      Pertinent Lab Data:    Lab Results   Component Value Date    HGBA1C 8.0 (A) 07/12/2024      Administrative Statements   Pharmacist Tracking Tool:   Reason For Outreach: Embedded Pharmacist  Demographics:  Intervention Method: Phone  Type of Intervention: Follow-Up  Topics Addressed: Diabetes  Pharmacologic Interventions: Dose or Frequency Adjusted  Non-Pharmacologic Interventions: Personal CGM  Time:  Direct Patient Care:  25  mins  Care Coordination:  15  mins  Recommendation Recipient: Patient/Caregiver  Outcome: Accepted    Juan Manuel Villalobos, PharmD  Clinical Integration Pharmacist  [facility] Physician [facility]

## 2024-11-18 NOTE — ASSESSMENT & PLAN NOTE
Bright red, subconj, hemorrhage since Friday. No EOM pain, no drainage. No vision problems.  - wait and monitor. expectant

## 2024-11-18 NOTE — ASSESSMENT & PLAN NOTE
Lab Results   Component Value Date    HGBA1C 8.0 (A) 07/12/2024     Continue 750mg XR BID metformin.

## 2024-11-19 NOTE — ASSESSMENT & PLAN NOTE
- Most recent A1c: above goal, but is trending down.   - CGM TIR: below goal but is improving. TBR is at goal and indicates no hypoglycemia  Patient reports decrease in activity and increase in food intake. Recent infection/wound aslo likely contributors. Overnight glucose is relatively stable, so Lantus is likely sufficient at is current dose. Will increase Humalog to address post-prandial hyperglycemia.       INTERVENTIONS:   1. Increase Humalog to 19 units before breakfast, 17 before lunch, 19 before dinner  2. Continue Lantus and metformin

## 2024-11-22 ENCOUNTER — OFFICE VISIT (OUTPATIENT)
Dept: PODIATRY | Facility: CLINIC | Age: 74
End: 2024-11-22
Payer: COMMERCIAL

## 2024-11-22 DIAGNOSIS — E11.40 TYPE 2 DIABETES MELLITUS WITH DIABETIC NEUROPATHY, WITHOUT LONG-TERM CURRENT USE OF INSULIN (HCC): ICD-10-CM

## 2024-11-22 DIAGNOSIS — Z89.431 S/P TRANSMETATARSAL AMPUTATION OF FOOT, RIGHT (HCC): ICD-10-CM

## 2024-11-22 DIAGNOSIS — L97.411 MIDFOOT SKIN ULCER, RIGHT, LIMITED TO BREAKDOWN OF SKIN (HCC): Primary | ICD-10-CM

## 2024-11-22 PROCEDURE — 99213 OFFICE O/P EST LOW 20 MIN: CPT | Performed by: PODIATRIST

## 2024-11-22 NOTE — PROGRESS NOTES
"   PATIENT:  Won Gaitan Jr.  1950        ASSESSMENT/PLAN:  1. Midfoot skin ulcer, right, limited to breakdown of skin ([facility])        2. S/P transmetatarsal amputation of foot, right ([facility])        3. Type 2 diabetes mellitus with diabetic neuropathy, without long-term current use of insulin ([facility])                1. Reviewed medical records.  Patient was counseled on the condition and diagnosis.  Educated disease prevention and risks related to diabetes.    2.  No significant improvement since the last visit.  Continue local care.  D/C neosporin.  Use silver alginate.  Off-loading with surgical shoe and knee scooter.    3. Stressed on pt compliance about proper off-loading in order for proper healing.    4. RA in 2 weeks.      HPI:  Won Gaitan Jr. is a 74 y.o.year old male seen for wound care.  He feels well.  Wound looks stable.  He has been using neosporin.  No redness or pain in right foot.  BS under control.         PAST MEDICAL HISTORY:  Past Medical History:   Diagnosis Date    Arthritis     Atrial fibrillation (HCC)     Diagnosed 11/2018    Benign prostate hyperplasia 04/2002    Cellulitis     right lower leg     Colon polyp 2006    Diabetes mellitus ([facility])     Hearing aid worn     bilat    Hearing loss     90% loss left ear and 40% right ear    History of clubfoot     \"since birth\"    History of pneumonia     History of TIA (transient ischemic attack) 04/25/2017 11/30/18 pt denies    Hyperlipidemia 5/15/2014    Hypertension     Infectious viral hepatitis     \"cant remember type\"    Irregular heart beat     Afib    Neuropathy     both feet    Osteomyelitis (HCC)     right great toe    Pneumonia     Right middle lobe pulmonary nodule 11/6/2018    Seasickness     Teeth missing     Type 2 diabetes mellitus with diabetic neuropathy ([facility]) 11/6/2018    Wears glasses     reading    Wound, open     bottom of right foot       PAST SURGICAL HISTORY:  Past Surgical History:   Procedure Laterality Date    " BUNIONECTOMY Right 12/4/2018    Procedure: 5TH METATARSAL BONE PARTIAL RESECTION, FULL THICKNESS DEBRIDEMENT OF DIABETIC ULCER;  Surgeon: Shala Flynn DPM;  [facility];  Service: Podiatry    CLOSURE DELAYED PRIMARY Left 10/30/2023    Procedure: CLOSURE DELAYED PRIMARY left foot;  Surgeon: Minh Uerna DPM;  [facility];  Service: Podiatry    CLUB FOOT RELEASE Bilateral     COLONOSCOPY      COMPLEX WOUND CLOSURE TO EXTREMITY  4/27/2021    Procedure: COMPLEX WOUND CLOSURE TO EXTREMITY: RIGHT FOOT;  Surgeon: Niraj Bojorquez MD;  [facility];  Service: Plastics    EXTERNAL FIXATOR APPLICATION Right 2/12/2021    Procedure: Application multiplane external fixation;  Surgeon: Harry Zamudio DPM;  [facility];  Service: Podiatry    FOOT HARDWARE REMOVAL Right 2/17/2021    Procedure: REMOVAL EXTERNAL FIXATOR;  Surgeon: Harry Zamudio DPM;  [facility];  Service: Podiatry    FOREIGN BODY REMOVAL Right 4/27/2021    Procedure: REMOVAL FOREIGN BODY EXTREMITY: RIGHT FOOT;  Surgeon: Niraj Bojorquez MD;  [facility];  Service: Plastics    INCISION AND DRAINAGE OF WOUND Right 2/17/2021    Procedure: INCISION AND DRAINAGE (I&D) EXTREMITY;  Surgeon: Harry Zamudio DPM;  [facility];  Service: Podiatry    INCISION AND DRAINAGE OF WOUND Left 10/26/2023    Procedure: INCISION AND DRAINAGE (I&D) EXTREMITY; WASHOUT; REMOVAL OF FOREIGN BODY;  Surgeon: Satnam Ponce DPM;  [facility];  Service: Podiatry    ORIF FOOT FRACTURE Right 3/4/2021    Procedure: VAC PLACEMENT; SCREW FIXATION RIGHT FOOT FUSION;  Surgeon: Harry Zamudio DPM;  [facility];  Service: Podiatry    MI AMPUTATION FOOT TRANSMETARSAL Right 2/22/2021    Procedure: AMPUTATION TRANSMETATARSAL (TMA), REMOVAL NAIL IM T2 ICF HARDWARE;  Surgeon: Harry Zamudio DPM;  [facility];  Service: Podiatry    MI AMPUTATION METATARSAL W/TOE SINGLE Left 11/29/2023    Procedure:  LEFT FOURTH RAY RESECTION FOOT;  Surgeon: Harry Zamudoi DPM;  [facility];  Service: Podiatry    SD ARTHRD MIDTARSL/TARS MLT/TRANSVRS W/OSTEOT Right 2/12/2021    Procedure: foot ARTHRODESIS/FUSION;  Surgeon: Harry Zamudio DPM;  [facility];  Service: Podiatry    SD DEBRIDEMENT BONE 1ST 20 SQ CM/< Right 12/2/2021    Procedure: DEBRIDEMENT OF TARSAL BONES WITH ANITBIODIC BEADS;  Surgeon: Harry Zamudio DPM;  [facility];  Service: Podiatry    SD LNGTH/SHRT TENDON LEG/ANKLE 1 TENDON SPX Right 2/12/2021    Procedure: LENGTHEN TIBIAL TENDON, TRANS HEEL CORD;  Surgeon: Harry Zamudio DPM;  [facility];  Service: Podiatry    SD MUSC MYOCUTANEOUS/FASCIOCUTANEOUS FLAP TRUNK Right 4/12/2021    Procedure: RECONSTRUCTION MICROSURGICAL W/ FREE FLAP;  Surgeon: Niraj Bojorquez MD;  [facility];  Service: Plastics    SD MUSC MYOCUTANEOUS/FASCIOCUTANEOUS FLAP TRUNK Right 4/12/2021    Procedure: TAKEBACK RECONSTRUCTION MICROSURGICAL W/ FREE FLAP;  Surgeon: Niraj Bojorquez MD;  [facility];  Service: Plastics    SD MUSC MYOCUTANEOUS/FASCIOCUTANEOUS FLAP TRUNK Right 4/13/2021    Procedure: RECONSTRUCTION MICROSURGICAL W/ FREE FLAP, RE EXPLORATION, MICRO VASCULAR REVISION;  Surgeon: Niraj Bojorquez MD;  [facility];  Service: Plastics    SD OSTC PRTL EXOSTC/CONDYLC METAR HEAD Right 12/29/2020    Procedure: EXCISION EXOSTOSIS;  Surgeon: Harry Zamudio DPM;  [facility];  Service: Podiatry    SD SECONDARY CLOSURE SURG WOUND/DEHSN XTNSV/COMP Right 12/29/2020    Procedure: PRIMARY DELAYED CLOSURE;  Surgeon: Harry Zamudoi DPM;  [facility];  Service: Podiatry    TOE AMPUTATION Right     partial great toe    TONSILLECTOMY      VAC DRESSING APPLICATION Right 3/1/2021    Procedure: APPLICATION VAC DRESSING EXTREMITY;  Surgeon: Niraj Bojorquez MD;  [facility];  Service: Plastics    VASECTOMY  1992    WOUND  DEBRIDEMENT Right 3/1/2021    Procedure: DEBRIDEMENT LOWER EXTREMITY (WASH OUT);  Surgeon: Niraj Bojorquez MD;  [facility];  Service: Plastics    WOUND DEBRIDEMENT Right 4/7/2021    Procedure: DEBRIDEMENT RIGHT FOOT;  Surgeon: Niraj Bojorquez MD;  [facility];  Service: Plastics    WOUND DEBRIDEMENT Right 4/12/2021    Procedure: DEBRIDEMENT LOWER EXTREMITY (WASH OUT);  Surgeon: Niraj Bojorquez MD;  [facility];  Service: Plastics    WOUND DEBRIDEMENT Right 4/27/2021    Procedure: DEBRIDEMENT LOWER EXTREMITY (WASH OUT): RIGHT FOOT;  Surgeon: Niraj Bojorquez MD;  [facility];  Service: Plastics        ALLERGIES:  Simvastatin and Itraconazole    MEDICATIONS:  Current Outpatient Medications   Medication Sig Dispense Refill    acetaminophen (Mapap Arthritis Pain) 650 mg CR tablet Take 1 tablet (650 mg total) by mouth 2 (two) times a day 60 tablet 1    atorvastatin (LIPITOR) 40 mg tablet Take 1 tablet (40 mg total) by mouth daily 90 tablet 3    Continuous Glucose Sensor (FreeStyle Taylor 3 Sensor) MISC Use 1 each every 14 (fourteen) days 1 each 0    diltiazem (CARDIZEM) 120 MG tablet TAKE 1 TABLET (120 MG TOTAL) BY MOUTH TWICE A  tablet 1    Eliquis 5 MG Take 1 tablet (5 mg total) by mouth 2 (two) times a day 60 tablet 11    glucose blood (Accu-Chek Guide) test strip USE DAILY 100 strip 2    Insulin Glargine Solostar (Lantus SoloStar) 100 UNIT/ML SOPN Inject 0.35 mL (35 Units total) under the skin daily at bedtime 45 mL 1    insulin lispro (HumaLOG) 100 units/mL injection pen Inject up to 20 Units under the skin 3 (three) times a day with meals 15 mL 5    Insulin Pen Needle (Easy Comfort Pen Needles) 33G X 5 MM MISC USE AS DIRECTED 100 each 4    Lancets (onetouch ultrasoft) lancets Use as instructed 100 each 3    Lancets (onetouch ultrasoft) lancets Use daily 100 each 3    lisinopril (ZESTRIL) 40 mg tablet Take 1 tablet (40 mg total) by mouth  daily 90 tablet 3    metFORMIN (GLUCOPHAGE-XR) 750 mg 24 hr tablet Take 1 tablet (750 mg total) by mouth 2 (two) times a day with meals 180 tablet 3    metoprolol succinate (TOPROL-XL) 100 mg 24 hr tablet TAKE 1 TABLET (100 MG TOTAL) BY MOUTH 2 (TWO) TIMES A  tablet 0    mupirocin (BACTROBAN) 2 % ointment Apply topically 3 (three) times a day 15 g 0    tamsulosin (FLOMAX) 0.4 mg Take 1 capsule (0.4 mg total) by mouth daily with dinner 90 capsule 2     No current facility-administered medications for this visit.       SOCIAL HISTORY:  Social History     Socioeconomic History    Marital status: /Civil Union     Spouse name: Not on file    Number of children: Not on file    Years of education: Not on file    Highest education level: Not on file   Occupational History    Not on file   Tobacco Use    Smoking status: Former     Current packs/day: 0.00     Types: Cigarettes     Quit date: 1988     Years since quittin.0     Passive exposure: Past    Smokeless tobacco: Former    Tobacco comments:     exposure to passive smoke   Vaping Use    Vaping status: Never Used   Substance and Sexual Activity    Alcohol use: Yes     Comment: occasionally    Drug use: Not Currently    Sexual activity: Yes     Partners: Female   Other Topics Concern    Not on file   Social History Narrative    Not on file     Social Drivers of Health     Financial Resource Strain: Not on file   Food Insecurity: No Food Insecurity (2024)    Nursing - Inadequate Food Risk Classification     Worried About Running Out of Food in the Last Year: Never true     Ran Out of Food in the Last Year: Never true     Ran Out of Food in the Last Year: Not on file   Transportation Needs: No Transportation Needs (2024)    PRAPARE - Transportation     Lack of Transportation (Medical): No     Lack of Transportation (Non-Medical): No   Physical Activity: Not on file   Stress: Not on file   Social Connections: Not on file   Intimate Partner  Violence: Not on file   Housing Stability: Low Risk  (7/12/2024)    Housing Stability Vital Sign     Unable to Pay for Housing in the Last Year: No     Number of Times Moved in the Last Year: 0     Homeless in the Last Year: No        REVIEW OF SYSTEMS:  GENERAL: No fever or chills  HEART: No chest pain, or palpitation  RESPIRATORY:  No acute SOB or cough  GI: No Nausea, vomit or diarrhea  NEUROLOGIC: No syncope or acute weakness    PHYSICAL EXAM:    There were no vitals taken for this visit.    VASCULAR EXAM  Dorsalis pedis  absent, Posterior tibial artery  +1.  The patient has class findings with skin atrophy, lack of digital hair, and nail dystrophy.  There is +1 lower extremity edema bilaterally.  Venous stasis skin changes noted BLE.    NEUROLOGIC EXAM  Sensation is intact to light touch.  Sensation is absent to 10gm monofilament.  No focal neurologic deficit.          DERMATOLOGIC EXAM:   Partial thick ulcer in right plantar foot.  It is about 0.9 X 1.0 cm.  Minimal depth.  Wound bed is granular.  Periwound maceration noted.  No deep probing. No redness or swelling. No purulence or signs of infection.      MUSCULOSKELETAL EXAM:   No acute joint pain, edema, or redness.  No acute musculoskeletal problem.  Patient has deformity including hammertoes.  S/P left 4th ray amp and right TMA.

## 2024-11-25 ENCOUNTER — TELEPHONE (OUTPATIENT)
Dept: FAMILY MEDICINE CLINIC | Facility: CLINIC | Age: 74
End: 2024-11-25

## 2024-11-25 NOTE — TELEPHONE ENCOUNTER
"[facility] Integration Pharmacy Services  Juan Manuel Villalobos, PharmD     Reason for Call:  - Incoming call from patient  - Voicemail transcription:    \"Dennis Zambrano, this is Won Sánchez. I'm having problems with my my Taylor here. I can't get it to go back in. I don't know if my password is right. You might have that information for me. I can't get it to work, so could you call me please when you have a chance? Whenever I'll be here. [phone]. Thank you. Isak.\"    Summary/Chart Review:    Patient reports technical difficulty with FreeStyle Taylor 2 CGM     Clinical Decision/Action:  Assessment: N/A  Plan: N/A    Message for Patient:    Juan Manuel is unable to assist with technical issues regarding Taylor. Patient will need to call Taylor customer support at [phone].             "

## 2024-11-27 DIAGNOSIS — E78.5 HYPERLIPIDEMIA, UNSPECIFIED HYPERLIPIDEMIA TYPE: Chronic | ICD-10-CM

## 2024-11-29 RX ORDER — ATORVASTATIN CALCIUM 40 MG/1
40 TABLET, FILM COATED ORAL DAILY
Qty: 90 TABLET | Refills: 2 | Status: SHIPPED | OUTPATIENT
Start: 2024-11-29

## 2024-12-02 ENCOUNTER — CLINICAL SUPPORT (OUTPATIENT)
Dept: FAMILY MEDICINE CLINIC | Facility: CLINIC | Age: 74
End: 2024-12-02

## 2024-12-02 DIAGNOSIS — Z79.899 MEDICATION MANAGEMENT: ICD-10-CM

## 2024-12-02 DIAGNOSIS — E11.40 TYPE 2 DIABETES MELLITUS WITH DIABETIC NEUROPATHY, WITH LONG-TERM CURRENT USE OF INSULIN (HCC): Primary | Chronic | ICD-10-CM

## 2024-12-02 DIAGNOSIS — Z79.4 TYPE 2 DIABETES MELLITUS WITH DIABETIC NEUROPATHY, WITH LONG-TERM CURRENT USE OF INSULIN (HCC): Primary | Chronic | ICD-10-CM

## 2024-12-02 PROCEDURE — PBNCHG PB NO CHARGE PLACEHOLDER: Performed by: PHARMACIST

## 2024-12-02 NOTE — LETTER
December 12, 2024    Mr. Gaitan:     Using the FreeStyle Taylor 3 CGM, you are able to share your data with our clinic so I can review your glucose readings. Data sharing is through [employer], which can be accessed from an internet browser or from a smart device with the [employer] parul. Things for you to know:  You must give permission to share your Tyfone data with our clinic's Tyfone account, and for you to see data uploaded at a clinic visit on your Tyfone account.  You can share with more than one clinic. Clinics are displayed once you have accepted a sharing invitation from that clinic.  After you approve to share data with a clinic, any following data uploaded to Tyfone can be accessed by the healthcare professionals at that clinic.  Users or clinics can stop sharing data at any time.          Link to our Tyfone Account  Go to: Menu > Connected Apps > Tyfone > Connect to a Practice > Enter Practice ID: [id number]  Click Next and then Connect   You will then see [facility] - Clinical Pharmacists listed under My Practices.   You're all set!       Juan Manuel Harrison, PharmD, BCPS, BCACP   Clinical Integration Pharmacist  [facility]

## 2024-12-03 NOTE — PROGRESS NOTES
[facility] Clinical Integration Pharmacy Services   Juan Manuel Villalobos, Ailyn     Won Gaitan Jr. is a 74 y.o. male who was referred to the clinical pharmacist by Pieter Torres DO for diabetes. Patient presents via telephone for follow-up.      Telemedicine consent  The patient was identified by name and date of birth. Won Gaitan Jr. was informed that this is a telemedicine visit and that the visit is being conducted through the [employer] Teams platform. He agrees to proceed..  My office door was closed. No one else was in the room.  He acknowledged consent and understanding of privacy and security of the telephone platform. The patient has agreed to participate and understands they can discontinue the visit at any time.    Assessment & Plan  Type 2 diabetes mellitus with diabetic neuropathy, with long-term current use of insulin (HCC)  - Most recent A1c: above goal, but not reflective of current medication regimen. Anticipate improvement with new labs  - CGM TIR: slightly below goal but is improving. TBR is at goal and indicates no hypoglycemia  Overall improving glucose trends. Postprandial hyperglycemia noted. .       Foot would healing slowly with close podiatry follow-up. Acknowledge patient is at risk for complications given DM + prior history    INTERVENTIONS:   1. Continue Humalog at 20 units before meals. Advised patient to add 1-2 units of Humalog BEFORE meals that are carb-heavy vs. using Humalog after meals in an effort to drive down post-prandial readings  2. Continue Lantus and metformin  3. Provided patient with clinic code to re-connect through [employer]  4. Continue podiatry follow-up.  Reinforce wound care instructions and offloading.   Medication management    Follow-up:   Follow-up with pharmacist in 3 weeks  Next PCP visit: 1/13/2025    - Home Monitoring Records: CGM data.  - Labs: A1c due now       Subjective:    Won Gaitan Jr. is a 74 y.o. male with a history of type 2 diabetes with long term  use of insulin. Diabetes course has been improving with treatment. Denies recent illness or hospitalizations. He denies any polyuria, polydipsia, polyphagia, fatigue and blurry vision. Denies recent hypoglycemic episodes.     Patient is anxious about slow-healing wound on his R planter foot. Being followed closely by Podiatry. He has prior amputations of toes that he reports began in a similar fashion.     He increased his Humalog to 20 units (slightly above dose discussed at last visit).     He has been having technical issues with [employer] CGM. Has been unable to connect to [employer] but has been monitoring his glucose readings diligently.       DM Self-Management:  - Self-monitoring: is performed regularly.  He checks glucose levels very frequently using CGM (> 30 scans/day). He uses smartphone as . Patient is connected to clinic [employer] account.   - Hypoglycemic episodes: none recently - History of Level 3/severe hypoglycemic event: none   - Hypoglycemia symptoms: weak, shaky; symptomatic with glucose levels 80-90  - Treatment of hypoglycemia: discussed treatment   Glucometer: Yes, Brand: Accu-Check Guide Me   CGM: Yes, Brand: FreeStyle Taylor 3     Current DM Regimen:  Humalog 20 units SQ AC  Lantus 35 units SQ HS (0.32 units/kg)  Metformin 750 mg BID    DM History:  Microvascular complications: nephropathy and peripheral neuropathy; S/P transmetatarsal amputation of R foot; S/P amputation of L lesser toe   Cardiovascular complications: none  - Statin: Yes   - ACEi/ARB: Yes    Previous DM medications:   Januvia  Glimepiride  Ozempic (cost)    CV Risk Factors:  - HF: No  - HTN: Yes - Obesity: No; Overweight   - CKD: normal GFR with albuminuria - Dyslipidemia: Yes - Smoking: Former     - Thyroid disorder: No  - History of pancreatitis: No    Drug Therapy Problems:  - Medication Adherence: Responsible for medication management: patient and spouse. Patient is noncompliant some of the time; takes insulin as  scheduled, however; has history of self-adjusting insulin doses. Denies any side effects from current medications.    Preventative Care:  Ophthamology: Overdue  Podiatry: Up to date; followed by Podiatry   Kidney Health: Up to date       CGM Data Review:    Device: FreeStyle Taylor 3  Dates: 11/26 - 12/2/2024 (7-day review)     Hypoglycemia: none       Metric CGM Target   Time above range (TAR) Very High >250 mg/dL 6 <5%   Time above range (TAR) High >180 mg/dL 29 <25%   Time in range (TIR)  mg/dL 65 >70%   Time below range (TBR) Low <70 mg/dL 0 <4%    Time below range (TBR)Very Low <54 mg/dL 0 <1%     More than 72 hours of data was reviewed. Report to be scanned to chart.          Meds/Allergies     Current Outpatient Medications:     acetaminophen (Mapap Arthritis Pain) 650 mg CR tablet, Take 1 tablet (650 mg total) by mouth 2 (two) times a day, Disp: 60 tablet, Rfl: 1    atorvastatin (LIPITOR) 40 mg tablet, TAKE 1 TABLET (40 MG TOTAL) BY MOUTH DAILY, Disp: 90 tablet, Rfl: 2    Continuous Glucose Sensor (FreeStyle Taylor 3 Sensor) MISC, Use 1 each every 14 (fourteen) days, Disp: 1 each, Rfl: 0    diltiazem (CARDIZEM) 120 MG tablet, TAKE 1 TABLET (120 MG TOTAL) BY MOUTH TWICE A DAY, Disp: 180 tablet, Rfl: 1    Eliquis 5 MG, Take 1 tablet (5 mg total) by mouth 2 (two) times a day, Disp: 60 tablet, Rfl: 11    glucose blood (Accu-Chek Guide) test strip, USE DAILY, Disp: 100 strip, Rfl: 2    Insulin Glargine Solostar (Lantus SoloStar) 100 UNIT/ML SOPN, Inject 0.35 mL (35 Units total) under the skin daily at bedtime, Disp: 45 mL, Rfl: 1    insulin lispro (HumaLOG) 100 units/mL injection pen, Inject up to 20 Units under the skin 3 (three) times a day with meals, Disp: 15 mL, Rfl: 5    Insulin Pen Needle (Easy Comfort Pen Needles) 33G X 5 MM MISC, USE AS DIRECTED, Disp: 100 each, Rfl: 4    Lancets (onetouch ultrasoft) lancets, Use as instructed, Disp: 100 each, Rfl: 3    Lancets (onetouch ultrasoft) lancets, Use  daily, Disp: 100 each, Rfl: 3    lisinopril (ZESTRIL) 40 mg tablet, Take 1 tablet (40 mg total) by mouth daily, Disp: 90 tablet, Rfl: 3    metFORMIN (GLUCOPHAGE-XR) 750 mg 24 hr tablet, Take 1 tablet (750 mg total) by mouth 2 (two) times a day with meals, Disp: 180 tablet, Rfl: 3    metoprolol succinate (TOPROL-XL) 100 mg 24 hr tablet, TAKE 1 TABLET (100 MG TOTAL) BY MOUTH 2 (TWO) TIMES A DAY, Disp: 180 tablet, Rfl: 0    mupirocin (BACTROBAN) 2 % ointment, Apply topically 3 (three) times a day, Disp: 15 g, Rfl: 0    tamsulosin (FLOMAX) 0.4 mg, Take 1 capsule (0.4 mg total) by mouth daily with dinner, Disp: 90 capsule, Rfl: 2    Allergies   Allergen Reactions    Simvastatin Myalgia    Itraconazole Other (See Comments)     Pt denies knowledge of allergy..      Objective     Pertinent Lab Data:    Lab Results   Component Value Date    HGBA1C 8.0 (A) 07/12/2024      Administrative Statements   Pharmacist Tracking Tool:   Reason For Outreach: Embedded Pharmacist  Demographics:  Intervention Method: Phone  Type of Intervention: Follow-Up  Topics Addressed: Diabetes  Pharmacologic Interventions: Dose or Frequency Adjusted  Non-Pharmacologic Interventions: Personal CGM  Time:  Direct Patient Care:  25  mins  Care Coordination:  15  mins  Recommendation Recipient: Patient/Caregiver  Outcome: Accepted    Juan Manuel Villalobos, PharmD  Clinical Integration Pharmacist  [facility] Physician [facility]

## 2024-12-03 NOTE — ED PROVIDER NOTES
History  Chief Complaint   Patient presents with    Cellulitis     Patient f/u appt with podiatry tomorrow, however, leg is swollen, red and warm to touch. Advised to come to ED for eval.      Patient is a 72-year-old male with a significant past medical history of diabetes, diabetic neuropathy, hypertension, hyperlipidemia, atrial fibrillation on Eliquis, presenting for evaluation of suspected cellulitis of the left lower extremity. He reports that for last several days he has been getting some increased redness and warmth of the left lower extremity similar to past episodes of cellulitis. He developed some fevers today and decided to come to the emergency department for further evaluation. He is unsure if he injured the foot in any way, although he did have some bleeding on the bottom of his foot. He also endorses some urinary frequency without any dysuria or hematuria. He has not done anything for his symptoms. Prior to Admission Medications   Prescriptions Last Dose Informant Patient Reported? Taking?    Accu-Chek Guide test strip 10/23/2023  No Yes   Sig: TEST BLOOD SUGARS once a day   Clenpiq 10-3.5-12 MG-GM -GM/160ML SOLN Unknown Self Yes No   Eliquis 5 MG 10/24/2023 Self No Yes   Sig: Take 1 tablet (5 mg total) by mouth 2 (two) times a day   Insulin Glargine Solostar (Lantus SoloStar) 100 UNIT/ML SOPN 10/23/2023  No Yes   Sig: Start with 15 units daily ; increase s directed   Lancets (onetouch ultrasoft) lancets 10/23/2023 Self No Yes   Sig: Use as instructed   Lancets (onetouch ultrasoft) lancets 10/23/2023  No Yes   Sig: Use daily   acetaminophen (Mapap Arthritis Pain) 650 mg CR tablet 10/24/2023 Self No Yes   Sig: Take 1 tablet (650 mg total) by mouth 2 (two) times a day   atorvastatin (LIPITOR) 40 mg tablet 10/24/2023 Self No Yes   Sig: Take 1 tablet (40 mg total) by mouth daily   diltiazem (CARDIZEM) 90 mg tablet 10/24/2023 Self No Yes   Sig: Take 1 tablet (90 mg total) by mouth 2 (two) times a day   glucose blood (OneTouch Ultra) test strip 10/23/2023 Self No Yes   Sig: Use as instructed   glucose blood (OneTouch Verio) test strip 10/23/2023 Self No Yes   Sig: Use 1 each daily Use as instructed   lisinopril (ZESTRIL) 40 mg tablet 10/24/2023 Self No Yes   Sig: Take 1 tablet (40 mg total) by mouth daily   metFORMIN (GLUCOPHAGE) 500 mg tablet 10/24/2023 Self No Yes   Sig: Take 1 tablet (500 mg total) by mouth 2 (two) times a day with meals   metoprolol succinate (TOPROL-XL) 25 mg 24 hr tablet 10/24/2023 Self No Yes   Sig: TAKE (3) TABLETS DAILY BY MOUTH BY MOUTH TWICE A DAY   tamsulosin (FLOMAX) 0.4 mg 10/23/2023  No Yes   Sig: TAKE 1 CAPSULE (0.4 MG TOTAL) BY MOUTH DAILY WITH DINNER      Facility-Administered Medications: None       Past Medical History:   Diagnosis Date    Arthritis     Atrial fibrillation ([address])     Diagnosed 11/2018    Benign prostate hyperplasia 04/2002    Cellulitis     right lower leg     Colon polyp 2006    Diabetes mellitus ([address])     Hearing aid worn     bilat    Hearing loss     90% loss left ear and 40% right ear    History of clubfoot     "since birth"    History of pneumonia     History of TIA (transient ischemic attack) 04/25/2017 11/30/18 pt denies    Hyperlipidemia 5/15/2014    Hypertension     Infectious viral hepatitis     "cant remember type"    Irregular heart beat     Afib    Neuropathy     both feet    Osteomyelitis ([address])     right great toe    Pneumonia     Right middle lobe pulmonary nodule 11/6/2018    Seasickness     Teeth missing     Type 2 diabetes mellitus with diabetic neuropathy ([address]) 11/6/2018    Wears glasses     reading    Wound, open     bottom of right foot       Past Surgical History:   Procedure Laterality Date    BUNIONECTOMY Right 12/4/2018    Procedure: 5TH METATARSAL BONE PARTIAL RESECTION, FULL THICKNESS DEBRIDEMENT OF DIABETIC ULCER;  Surgeon: Em Galvez DPM;  [facility];  Service: Podiatry    CLUB FOOT RELEASE Bilateral COLONOSCOPY      COMPLEX WOUND CLOSURE TO EXTREMITY  4/27/2021    Procedure: COMPLEX WOUND CLOSURE TO EXTREMITY: RIGHT FOOT;  Surgeon: Sarita May MD;  [facility];  Service: Plastics    EXTERNAL FIXATOR APPLICATION Right 0/52/2363    Procedure: Application multiplane external fixation;  Surgeon: Michael Long DPM;  [facility];  Service: Podiatry    FOOT HARDWARE REMOVAL Right 2/17/2021    Procedure: REMOVAL EXTERNAL FIXATOR;  Surgeon: Michael Long DPM;  [facility];  Service: Podiatry    FOREIGN BODY REMOVAL Right 4/27/2021    Procedure: REMOVAL FOREIGN BODY EXTREMITY: RIGHT FOOT;  Surgeon: Sarita May MD;  [facility];  Service: Plastics    INCISION AND DRAINAGE OF WOUND Right 2/17/2021    Procedure: INCISION AND DRAINAGE (I&D) EXTREMITY;  Surgeon: Micheal Long DPM;  [facility];  Service: Podiatry    ORIF FOOT FRACTURE Right 3/4/2021    Procedure: VAC PLACEMENT; SCREW FIXATION RIGHT FOOT FUSION;  Surgeon: Michael Long DPM;  [facility];  Service: Podiatry    CO AMPUTATION FOOT TRANSMETARSAL Right 2/22/2021    Procedure: AMPUTATION TRANSMETATARSAL (TMA), REMOVAL NAIL IM T2 ICF HARDWARE;  Surgeon: Michael Long DPM;  [facility];  Service: Podiatry    CO ARTHRD MIDTARSL/TARS MLT/TRANSVRS W/OSTEOT Right 2/12/2021    Procedure: foot ARTHRODESIS/FUSION;  Surgeon: Michael Long DPM;  [facility];  Service: Podiatry    CO DEBRIDEMENT BONE MUSCLE &/FASCIA 20 SQ CM/< Right 12/2/2021    Procedure: DEBRIDEMENT OF TARSAL BONES WITH Dami Kerrick;  Surgeon: Michael Long DPM;  [facility];  Service: Podiatry    CO LNGTH/SHRT TENDON LEG/ANKLE 1 TENDON SPX Right 2/12/2021    Procedure: LENGTHEN TIBIAL TENDON, TRANS HEEL CORD;  Surgeon: Michael Long DPM;  [facility];  Service: Podiatry    CO MUSC MYOCUTANEOUS/FASCIOCUTANEOUS FLAP TRUNK Right 4/12/2021    Procedure: RECONSTRUCTION MICROSURGICAL W/ FREE FLAP;  Surgeon: Cachorro Morales MD;  [facility];  Service: Plastics    GA MUSC MYOCUTANEOUS/FASCIOCUTANEOUS FLAP TRUNK Right 4/12/2021    Procedure: TAKEBACK RECONSTRUCTION MICROSURGICAL W/ FREE FLAP;  Surgeon: Cachorro Morales MD;  [facility];  Service: Plastics    GA MUSC MYOCUTANEOUS/FASCIOCUTANEOUS FLAP TRUNK Right 4/13/2021    Procedure: RECONSTRUCTION MICROSURGICAL W/ FREE FLAP, RE EXPLORATION, MICRO VASCULAR REVISION;  Surgeon: Cachorro Morales MD;  [facility];  Service: Plastics    GA OSTC PRTL EXOSTC/CONDYLC METAR HEAD Right 12/29/2020    Procedure: EXCISION EXOSTOSIS;  Surgeon: Abhinav Rankin DPM;  [facility];  Service: Podiatry    GA SECONDARY CLOSURE SURG WOUND/DEHSN EXTSV/COMPLIC Right 75/70/9445    Procedure: PRIMARY DELAYED CLOSURE;  Surgeon: Abhinav Rankin DPM;  [facility];  Service: Podiatry    TOE AMPUTATION Right     partial great toe    TONSILLECTOMY      VAC DRESSING APPLICATION Right 4/0/4766    Procedure: APPLICATION VAC DRESSING EXTREMITY;  Surgeon: Cachorro Morales MD;  [facility];  Service: [address] DEBRIDEMENT Right 3/1/2021    Procedure: DEBRIDEMENT LOWER EXTREMITY [facility] OUT); Surgeon: Cachorro Morales MD;  [facility];  Service: Plastics    WOUND DEBRIDEMENT Right 4/7/2021    Procedure: DEBRIDEMENT RIGHT FOOT;  Surgeon: Cachorro Morales MD;  [facility];  Service: Plastics    WOUND DEBRIDEMENT Right 4/12/2021    Procedure: DEBRIDEMENT LOWER EXTREMITY [facility] OUT);   Surgeon: Cachorro Morales MD;  [facility];  Service: Plastics    WOUND DEBRIDEMENT Right 4/27/2021    Procedure: DEBRIDEMENT LOWER EXTREMITY [facility] OUT): RIGHT FOOT;  Surgeon: Cachorro Morales MD;  [facility];  Service: Plastics       Family History   Problem Relation Age of Onset    Diabetes Father         mellitus I have reviewed and agree with the history as documented. E-Cigarette/Vaping    E-Cigarette Use Never User      E-Cigarette/Vaping Substances    Nicotine No     THC No     CBD No     Flavoring No     Other No     Unknown No      Social History     Tobacco Use    Smoking status: Former     Types: Cigarettes     Quit date: 1988     Years since quittin.9    Smokeless tobacco: Former    Tobacco comments:     exposure to passive smoke   Vaping Use    Vaping Use: Never used   Substance Use Topics    Alcohol use: Yes     Comment: occasionally    Drug use: Not Currently        Review of Systems   Constitutional:  Positive for fever. Respiratory:  Negative for cough and shortness of breath. Gastrointestinal:  Negative for abdominal pain, diarrhea, nausea and vomiting. Genitourinary:  Positive for frequency. Negative for flank pain and hematuria. Skin:  Positive for rash (Left lower extremity) and wound (Left lower extremity). All other systems reviewed and are negative. Physical Exam  ED Triage Vitals [10/24/23 1108]   Temperature Pulse Respirations Blood Pressure SpO2   (!) 101.4 °F (38.6 °C) (!) 124 20 (!) 162/117 97 %      Temp Source Heart Rate Source Patient Position - Orthostatic VS BP Location FiO2 (%)   Oral Monitor Sitting Left arm --      Pain Score       5             Orthostatic Vital Signs  Vitals:    10/24/23 1108 10/24/23 1300 10/24/23 1445 10/24/23 1633   BP: (!) 162/117 132/77 131/75 131/68   Pulse: (!) 124 (!) 109 85 91   Patient Position - Orthostatic VS: Sitting          Physical Exam  Vitals and nursing note reviewed. Constitutional:       General: He is not in acute distress. Appearance: Normal appearance. He is not ill-appearing or toxic-appearing. HENT:      Head: Normocephalic and atraumatic. Right Ear: External ear normal.      Left Ear: External ear normal.      Nose: Nose normal.   Eyes:      General: No scleral icterus. Right eye: No discharge. Detail Level: Generalized Left eye: No discharge. Extraocular Movements: Extraocular movements intact. Conjunctiva/sclera: Conjunctivae normal.   Cardiovascular:      Rate and Rhythm: Tachycardia present. Heart sounds: Normal heart sounds. No murmur heard. No friction rub. No gallop. Pulmonary:      Effort: Pulmonary effort is normal. No respiratory distress. Breath sounds: Normal breath sounds. Abdominal:      General: Abdomen is flat. There is no distension. Palpations: Abdomen is soft. There is no mass. Tenderness: There is no abdominal tenderness. Genitourinary:     Comments: Deferred  Skin:     General: Skin is warm and dry. Findings: Erythema present. Comments: There is erythema and overlying warmth of the left lower extremity. There is a small of the plantar aspect of the foot between the fourth and fifth metatarsal.  No active bleeding. No crepitus. No pain out of proportion. Sensation at baseline. Radial pulses 2+/4. Neurological:      General: No focal deficit present. Mental Status: He is alert.          ED Medications  Medications   atorvastatin (LIPITOR) tablet 40 mg (has no administration in time range)   diltiazem (CARDIZEM) tablet 90 mg (has no administration in time range)   apixaban (ELIQUIS) tablet 5 mg (has no administration in time range)   lisinopril (ZESTRIL) tablet 40 mg (has no administration in time range)   metoprolol succinate (TOPROL-XL) 24 hr tablet 75 mg (has no administration in time range)   tamsulosin (FLOMAX) capsule 0.4 mg (has no administration in time range)   senna (SENOKOT) tablet 8.6 mg (0 mg Oral Hold 10/24/23 0729)   acetaminophen (TYLENOL) tablet 975 mg (has no administration in time range)   insulin lispro (HumaLOG) 100 units/mL subcutaneous injection 1-6 Units (has no administration in time range)   insulin lispro (HumaLOG) 100 units/mL subcutaneous injection 1-6 Units (has no administration in time range)   cefepime (MAXIPIME) 2 g/50 mL dextrose IVPB (has no administration in time range)   polyethylene glycol (MIRALAX) packet 17 g (has no administration in time range)   vancomycin (VANCOCIN) IVPB (premix in dextrose) 1,000 mg 200 mL (has no administration in time range)   tetanus-diphtheria toxoids (TENIVAC) IM injection 0.5 mL (has no administration in time range)   cefepime (MAXIPIME) 2 g/50 mL dextrose IVPB (0 mg Intravenous Stopped 10/24/23 1215)   vancomycin (VANCOCIN) 1500 mg in sodium chloride 0.9% 250 mL IVPB (0 mg Intravenous Stopped 10/24/23 1418)   acetaminophen (TYLENOL) tablet 975 mg (975 mg Oral Given 10/24/23 1143)   sodium chloride 0.9 % bolus 1,000 mL (0 mL Intravenous Stopped 10/24/23 1418)       Diagnostic Studies  Results Reviewed       Procedure Component Value Units Date/Time    MRSA culture [[id number]] Collected: 10/24/23 1631    Lab Status: In process Specimen: Nares from Nose Updated: 10/24/23 1637    Urine Microscopic [[id number]]  (Abnormal) Collected: 10/24/23 1205    Lab Status: Final result Specimen: Urine, Clean Catch Updated: 10/24/23 1316     RBC, UA None Seen /hpf      WBC, UA Innumerable /hpf      Epithelial Cells None Seen /hpf      Bacteria, UA None Seen /hpf     Urine culture [[id number]] Collected: 10/24/23 1205    Lab Status:  In process Specimen: Urine, Clean Catch Updated: 10/24/23 1316    UA w Reflex to Microscopic w Reflex to Culture [[id number]]  (Abnormal) Collected: 10/24/23 1205    Lab Status: Final result Specimen: Urine, Clean Catch Updated: 10/24/23 1255     Color, UA Yellow     Clarity, UA Turbid     Specific Gravity, UA 1.022     pH, UA 7.5     Leukocytes, UA Large     Nitrite, UA Negative     Protein, UA 50 (1+) mg/dl      Glucose, UA >=1000 (1%) mg/dl      Ketones, UA Negative mg/dl      Urobilinogen, UA <2.0 mg/dl      Bilirubin, UA Negative     Occult Blood, UA Negative    C-reactive protein [[id number]]  (Abnormal) Collected: 10/24/23 1142    Lab Status: Final result Specimen: Blood General Sunscreen Counseling: I recommended a broad spectrum sunscreen with a SPF of 30 or higher.  I explained that SPF 30 sunscreens block approximately 97 percent of the sun's harmful rays.  Sunscreens should be applied at least 15 minutes prior to expected sun exposure and then every 2 hours after that as long as sun exposure continues. If swimming or exercising sunscreen should be reapplied every 45 minutes to an hour after getting wet or sweating.  One ounce, or the equivalent of a shot glass full of sunscreen, is adequate to protect the skin not covered by a bathing suit. I also recommended a lip balm with a sunscreen as well. Sun protective clothing can be used in lieu of sunscreen but must be worn the entire time you are exposed to the sun's rays. from Arm, Right Updated: 10/24/23 1234     CRP 53.1 mg/L     Lactic acid, plasma (w/reflex if result > 2.0) [[id number]]  (Normal) Collected: 10/24/23 1137    Lab Status: Final result Specimen: Blood from Arm, Left Updated: 10/24/23 1232     LACTIC ACID 1.0 mmol/L     Narrative:      Result may be elevated if tourniquet was used during collection.     Comprehensive metabolic panel [[id number]]  (Abnormal) Collected: 10/24/23 1137    Lab Status: Final result Specimen: Blood from Arm, Left Updated: 10/24/23 1231     Sodium 135 mmol/L      Potassium 4.4 mmol/L      Chloride 104 mmol/L      CO2 24 mmol/L      ANION GAP 7 mmol/L      BUN 19 mg/dL      Creatinine 0.94 mg/dL      Glucose 288 mg/dL      Calcium 9.1 mg/dL      AST 13 U/L      ALT 17 U/L      Alkaline Phosphatase 89 U/L      Total Protein 7.0 g/dL      Albumin 4.1 g/dL      Total Bilirubin 1.05 mg/dL      eGFR 81 ml/min/1.73sq m     Narrative:      [place] guidelines for Chronic Kidney Disease (CKD):     Stage 1 with normal or high GFR (GFR > 90 mL/min/1.73 square meters)    Stage 2 Mild CKD (GFR = 60-89 mL/min/1.73 square meters)    Stage 3A Moderate CKD (GFR = 45-59 mL/min/1.73 square meters)    Stage 3B Moderate CKD (GFR = 30-44 mL/min/1.73 square meters)    Stage 4 Severe CKD (GFR = 15-29 mL/min/1.73 square meters)    Stage 5 End Stage CKD (GFR <15 mL/min/1.73 square meters)  Note: GFR calculation is accurate only with a steady state creatinine    Procalcitonin [[id number]]  (Normal) Collected: 10/24/23 1137    Lab Status: Final result Specimen: Blood from Arm, Left Updated: 10/24/23 1217     Procalcitonin 0.09 ng/ml     Sedimentation rate, automated [[id number]]  (Abnormal) Collected: 10/24/23 1142    Lab Status: Final result Specimen: Blood from Arm, Right Updated: 10/24/23 1205     Sed Rate 27 mm/hour     APTT [[id number]]  (Normal) Collected: 10/24/23 7625    Lab Status: Final result Specimen: Blood from Arm, Left Updated: 10/24/23 1204     PTT 33 seconds     Protime-INR [[id number]]  (Abnormal) Collected: 10/24/23 1137    Lab Status: Final result Specimen: Blood from Arm, Left Updated: 10/24/23 1204     Protime 16.7 seconds      INR 1.37    Blood culture #2 [[id number]] Collected: 10/24/23 1142    Lab Status: In process Specimen: Blood from Arm, Right Updated: 10/24/23 1148    CBC and differential [[id number]]  (Abnormal) Collected: 10/24/23 1137    Lab Status: Final result Specimen: Blood from Arm, Left Updated: 10/24/23 1147     WBC 15.08 Thousand/uL      RBC 4.54 Million/uL      Hemoglobin 14.7 g/dL      Hematocrit 44.8 %      MCV 99 fL      MCH 32.4 pg      MCHC 32.8 g/dL      RDW 13.6 %      MPV 10.6 fL      Platelets 143 Thousands/uL      nRBC 0 /100 WBCs      Neutrophils Relative 84 %      Immat GRANS % 0 %      Lymphocytes Relative 8 %      Monocytes Relative 7 %      Eosinophils Relative 1 %      Basophils Relative 0 %      Neutrophils Absolute 12.63 Thousands/µL      Immature Grans Absolute 0.06 Thousand/uL      Lymphocytes Absolute 1.25 Thousands/µL      Monocytes Absolute 0.99 Thousand/µL      Eosinophils Absolute 0.12 Thousand/µL      Basophils Absolute 0.03 Thousands/µL     Blood culture #1 [[id number]] Collected: 10/24/23 1137    Lab Status: In process Specimen: Blood from Arm, Left Updated: 10/24/23 1142                   XR foot 3+ views LEFT   ED Interpretation by Zabrina Kong MD (10/24 1343)   No acute bony abnormalities. No subcutaneous emphysema. Procedures  Procedures      ED Course  ED Course as of 10/24/23 1644   Tue Oct 24, 2023   1153 WBC(!): 15.08   1246 LACTIC ACID: 1.0                          Initial Sepsis Screening       Row Name 10/24/23 1142                Is the patient's history suggestive of a new or worsening infection?  Yes (Proceed)  -GJ        Suspected source of infection soft tissue  -GJ        Indicate SIRS criteria Hyperthemia > 38.3C (100.9F) OR Hypothermia <36C (96.8F); Tachycardia > 90 bpm  -GJ        Are two or more of the above signs & symptoms of infection both present and new to the patient? Yes (Proceed)  -GJ        Assess for evidence of organ dysfunction: Are any of the below criteria present within 6 hours of suspected infection and SIRS criteria that are NOT considered to be chronic conditions? --                  User Key  (r) = Recorded By, (t) = Taken By, (c) = Cosigned By      [address] [facility] Name Provider Type    [address] [place] [address],  Resident                              Medical Decision Making  Patient is a 60-year-old male presenting for evaluation of suspected cellulitis of the left lower extremity. Based on history and evaluation, differential diagnosis includes but is not limited to: Cellulitis, sepsis, urinary tract infection. Plan: Sepsis labs, ESR, CRP, urinalysis, x-ray left foot, reassessment    Labs markable for a leukocytosis, elevated ESR and CRP. Otherwise unremarkable. Urinalysis with initial leukocytes, will wait on micro results. X-ray left foot with no acute osseous abnormalities or concern for osteomyelitis. Suspect that the patient is septic from cellulitis versus urinary tract infection. Patient already initiated on cefepime as well as vancomycin. Recommend admission for further management. Patient seems understand this plan and is agreeable. All questions answered. Patient admitted. Amount and/or Complexity of Data Reviewed  Labs: ordered. Decision-making details documented in ED Course. Radiology: ordered and independent interpretation performed. Risk  OTC drugs. Decision regarding hospitalization.           Disposition  Final diagnoses:   Cellulitis of left lower leg   Sepsis ([address])     Time reflects when diagnosis was documented in both MDM as applicable and the Disposition within this note       Time User Action Codes Description Comment    10/24/2023  1:01 PM Black Fitzpatrick Add [L03.116] Cellulitis of left lower leg     10/24/2023  1:02 PM Rizwantracy Velasco Add [A41.9] Sepsis [facility])           ED Disposition       ED Disposition   Admit    Condition   Stable    Date/Time   Tue Oct 24, 2023  1:02 PM    Comment   Case was discussed with SOD and the patient's admission status was agreed to be Admission Status: inpatient status to the service of Dr. Melissa Yu . Follow-up Information    None         Patient's Medications   Discharge Prescriptions    No medications on file     No discharge procedures on file. PDMP Review       None             ED Provider  Attending physically available and evaluated Shaylee Acosta. . I managed the patient along with the ED Attending.     Electronically Signed by           Fab Rascon DO  10/24/23 0451

## 2024-12-03 NOTE — ASSESSMENT & PLAN NOTE
- Most recent A1c: above goal, but not reflective of current medication regimen. Anticipate improvement with new labs  - CGM TIR: slightly below goal but is improving. TBR is at goal and indicates no hypoglycemia  Overall improving glucose trends. Postprandial hyperglycemia noted. .       Foot would healing slowly with close podiatry follow-up. Acknowledge patient is at risk for complications given DM + prior history    INTERVENTIONS:   1. Continue Humalog at 20 units before meals. Advised patient to add 1-2 units of Humalog BEFORE meals that are carb-heavy vs. using Humalog after meals in an effort to drive down post-prandial readings  2. Continue Lantus and metformin  3. Provided patient with clinic code to re-connect through [employer]  4. Continue podiatry follow-up.  Reinforce wound care instructions and offloading.

## 2024-12-10 ENCOUNTER — OFFICE VISIT (OUTPATIENT)
Dept: PODIATRY | Facility: CLINIC | Age: 74
End: 2024-12-10
Payer: COMMERCIAL

## 2024-12-10 VITALS
BODY MASS INDEX: 29.21 KG/M2 | WEIGHT: 240 LBS | DIASTOLIC BLOOD PRESSURE: 80 MMHG | SYSTOLIC BLOOD PRESSURE: 145 MMHG | HEART RATE: 85 BPM

## 2024-12-10 DIAGNOSIS — L97.412 ULCER OF MIDFOOT, RIGHT, WITH FAT LAYER EXPOSED (HCC): Primary | ICD-10-CM

## 2024-12-10 DIAGNOSIS — E11.40 TYPE 2 DIABETES MELLITUS WITH DIABETIC NEUROPATHY, WITHOUT LONG-TERM CURRENT USE OF INSULIN (HCC): ICD-10-CM

## 2024-12-10 PROCEDURE — 99213 OFFICE O/P EST LOW 20 MIN: CPT | Performed by: PODIATRIST

## 2024-12-10 NOTE — PROGRESS NOTES
"   PATIENT:  Won Gaitan Jr.  1950        ASSESSMENT/PLAN:  1. Ulcer of midfoot, right, with fat layer exposed ([facility])  Ambulatory Referral to Wound Care      2. Type 2 diabetes mellitus with diabetic neuropathy, without long-term current use of insulin ([facility])  Ambulatory Referral to Wound Care              1. Reviewed medical records.  Patient was counseled on the condition and diagnosis.  Educated disease prevention and risks related to diabetes.    2.  Slight decrease in size of the wound.  Recommended him TCC.  Will schedule him at wound center for TCC.   Continue silver alginate for now.  Off-loading with surgical shoe and knee scooter.    3. Stressed on pt compliance about proper off-loading in order for proper healing.        HPI:  Won Gaitan Jr. is a 74 y.o.year old male seen for wound care.  He feels well.  Wound looks stable.   No redness or pain in right foot.  BS under control.  He presents with regular diabetic shoes.      PAST MEDICAL HISTORY:  Past Medical History:   Diagnosis Date    Arthritis     Atrial fibrillation (HCC)     Diagnosed 11/2018    Benign prostate hyperplasia 04/2002    Cellulitis     right lower leg     Colon polyp 2006    Diabetes mellitus (HCC)     Hearing aid worn     bilat    Hearing loss     90% loss left ear and 40% right ear    History of clubfoot     \"since birth\"    History of pneumonia     History of TIA (transient ischemic attack) 04/25/2017 11/30/18 pt denies    Hyperlipidemia 5/15/2014    Hypertension     Infectious viral hepatitis     \"cant remember type\"    Irregular heart beat     Afib    Neuropathy     both feet    Osteomyelitis (HCC)     right great toe    Pneumonia     Right middle lobe pulmonary nodule 11/6/2018    Seasickness     Teeth missing     Type 2 diabetes mellitus with diabetic neuropathy (HCC) 11/6/2018    Wears glasses     reading    Wound, open     bottom of right foot       PAST SURGICAL HISTORY:  Past Surgical History:   Procedure Laterality " Date    BUNIONECTOMY Right 12/4/2018    Procedure: 5TH METATARSAL BONE PARTIAL RESECTION, FULL THICKNESS DEBRIDEMENT OF DIABETIC ULCER;  Surgeon: Shala Flynn DPM;  [facility];  Service: Podiatry    CLOSURE DELAYED PRIMARY Left 10/30/2023    Procedure: CLOSURE DELAYED PRIMARY left foot;  Surgeon: Minh Urena DPM;  [facility];  Service: Podiatry    CLUB FOOT RELEASE Bilateral     COLONOSCOPY      COMPLEX WOUND CLOSURE TO EXTREMITY  4/27/2021    Procedure: COMPLEX WOUND CLOSURE TO EXTREMITY: RIGHT FOOT;  Surgeon: Niraj Bojorquez MD;  [facility];  Service: Plastics    EXTERNAL FIXATOR APPLICATION Right 2/12/2021    Procedure: Application multiplane external fixation;  Surgeon: Harry Zamudio DPM;  [facility];  Service: Podiatry    FOOT HARDWARE REMOVAL Right 2/17/2021    Procedure: REMOVAL EXTERNAL FIXATOR;  Surgeon: Harry Zamudio DPM;  [facility];  Service: Podiatry    FOREIGN BODY REMOVAL Right 4/27/2021    Procedure: REMOVAL FOREIGN BODY EXTREMITY: RIGHT FOOT;  Surgeon: Niraj Bojorquez MD;  [facility];  Service: Plastics    INCISION AND DRAINAGE OF WOUND Right 2/17/2021    Procedure: INCISION AND DRAINAGE (I&D) EXTREMITY;  Surgeon: Harry Zamudio DPM;  [facility];  Service: Podiatry    INCISION AND DRAINAGE OF WOUND Left 10/26/2023    Procedure: INCISION AND DRAINAGE (I&D) EXTREMITY; WASHOUT; REMOVAL OF FOREIGN BODY;  Surgeon: Satnam Ponce DPM;  [facility];  Service: Podiatry    ORIF FOOT FRACTURE Right 3/4/2021    Procedure: VAC PLACEMENT; SCREW FIXATION RIGHT FOOT FUSION;  Surgeon: Harry Zamudio DPM;  [facility];  Service: Podiatry    NC AMPUTATION FOOT TRANSMETARSAL Right 2/22/2021    Procedure: AMPUTATION TRANSMETATARSAL (TMA), REMOVAL NAIL IM T2 ICF HARDWARE;  Surgeon: Harry Zamudio DPM;  [facility];  Service: Podiatry    NC AMPUTATION METATARSAL W/TOE SINGLE Left 11/29/2023     Procedure: LEFT FOURTH RAY RESECTION FOOT;  Surgeon: Harry Zamudio DPM;  [facility];  Service: Podiatry    RI ARTHRD MIDTARSL/TARS MLT/TRANSVRS W/OSTEOT Right 2/12/2021    Procedure: foot ARTHRODESIS/FUSION;  Surgeon: Harry Zamudio DPM;  [facility];  Service: Podiatry    RI DEBRIDEMENT BONE 1ST 20 SQ CM/< Right 12/2/2021    Procedure: DEBRIDEMENT OF TARSAL BONES WITH ANITBIODIC BEADS;  Surgeon: Harry Zamudio DPM;  [facility];  Service: Podiatry    RI LNGTH/SHRT TENDON LEG/ANKLE 1 TENDON SPX Right 2/12/2021    Procedure: LENGTHEN TIBIAL TENDON, TRANS HEEL CORD;  Surgeon: Harry Zamudio DPM;  [facility];  Service: Podiatry    RI MUSC MYOCUTANEOUS/FASCIOCUTANEOUS FLAP TRUNK Right 4/12/2021    Procedure: RECONSTRUCTION MICROSURGICAL W/ FREE FLAP;  Surgeon: Niraj Bojorquez MD;  [facility];  Service: Plastics    RI MUSC MYOCUTANEOUS/FASCIOCUTANEOUS FLAP TRUNK Right 4/12/2021    Procedure: TAKEBACK RECONSTRUCTION MICROSURGICAL W/ FREE FLAP;  Surgeon: Niraj Bojorquez MD;  [facility];  Service: Plastics    RI MUSC MYOCUTANEOUS/FASCIOCUTANEOUS FLAP TRUNK Right 4/13/2021    Procedure: RECONSTRUCTION MICROSURGICAL W/ FREE FLAP, RE EXPLORATION, MICRO VASCULAR REVISION;  Surgeon: Niraj Bojorquez MD;  [facility];  Service: Plastics    RI OSTC PRTL EXOSTC/CONDYLC METAR HEAD Right 12/29/2020    Procedure: EXCISION EXOSTOSIS;  Surgeon: Harry Zamudio DPM;  [facility];  Service: Podiatry    RI SECONDARY CLOSURE SURG WOUND/DEHSN XTNSV/COMP Right 12/29/2020    Procedure: PRIMARY DELAYED CLOSURE;  Surgeon: Harry Zamudio DPM;  [facility];  Service: Podiatry    TOE AMPUTATION Right     partial great toe    TONSILLECTOMY      VAC DRESSING APPLICATION Right 3/1/2021    Procedure: APPLICATION VAC DRESSING EXTREMITY;  Surgeon: Niraj Bojorquez MD;  [facility];  Service: Plastics    VASECTOMY   1992    WOUND DEBRIDEMENT Right 3/1/2021    Procedure: DEBRIDEMENT LOWER EXTREMITY (WASH OUT);  Surgeon: Niraj Bojorquez MD;  [facility];  Service: Plastics    WOUND DEBRIDEMENT Right 4/7/2021    Procedure: DEBRIDEMENT RIGHT FOOT;  Surgeon: Niraj Bojorquez MD;  [facility];  Service: Plastics    WOUND DEBRIDEMENT Right 4/12/2021    Procedure: DEBRIDEMENT LOWER EXTREMITY (WASH OUT);  Surgeon: Niraj Bojorquez MD;  [facility];  Service: Plastics    WOUND DEBRIDEMENT Right 4/27/2021    Procedure: DEBRIDEMENT LOWER EXTREMITY (WASH OUT): RIGHT FOOT;  Surgeon: Niraj Bojorquez MD;  [facility];  Service: Plastics        ALLERGIES:  Simvastatin and Itraconazole    MEDICATIONS:  Current Outpatient Medications   Medication Sig Dispense Refill    acetaminophen (Mapap Arthritis Pain) 650 mg CR tablet Take 1 tablet (650 mg total) by mouth 2 (two) times a day 60 tablet 1    atorvastatin (LIPITOR) 40 mg tablet TAKE 1 TABLET (40 MG TOTAL) BY MOUTH DAILY 90 tablet 2    Continuous Glucose Sensor (FreeStyle Taylor 3 Sensor) MISC Use 1 each every 14 (fourteen) days 1 each 0    diltiazem (CARDIZEM) 120 MG tablet TAKE 1 TABLET (120 MG TOTAL) BY MOUTH TWICE A  tablet 1    Eliquis 5 MG Take 1 tablet (5 mg total) by mouth 2 (two) times a day 60 tablet 11    glucose blood (Accu-Chek Guide) test strip USE DAILY 100 strip 2    Insulin Glargine Solostar (Lantus SoloStar) 100 UNIT/ML SOPN Inject 0.35 mL (35 Units total) under the skin daily at bedtime 45 mL 1    insulin lispro (HumaLOG) 100 units/mL injection pen Inject up to 20 Units under the skin 3 (three) times a day with meals 15 mL 5    Insulin Pen Needle (Easy Comfort Pen Needles) 33G X 5 MM MISC USE AS DIRECTED 100 each 4    Lancets (onetouch ultrasoft) lancets Use as instructed 100 each 3    Lancets (onetouch ultrasoft) lancets Use daily 100 each 3    lisinopril (ZESTRIL) 40 mg tablet Take 1 tablet (40 mg  total) by mouth daily 90 tablet 3    metFORMIN (GLUCOPHAGE-XR) 750 mg 24 hr tablet Take 1 tablet (750 mg total) by mouth 2 (two) times a day with meals 180 tablet 3    metoprolol succinate (TOPROL-XL) 100 mg 24 hr tablet TAKE 1 TABLET (100 MG TOTAL) BY MOUTH 2 (TWO) TIMES A  tablet 0    mupirocin (BACTROBAN) 2 % ointment Apply topically 3 (three) times a day 15 g 0    tamsulosin (FLOMAX) 0.4 mg Take 1 capsule (0.4 mg total) by mouth daily with dinner 90 capsule 2     No current facility-administered medications for this visit.       SOCIAL HISTORY:  Social History     Socioeconomic History    Marital status: /Civil Union     Spouse name: None    Number of children: None    Years of education: None    Highest education level: None   Occupational History    None   Tobacco Use    Smoking status: Former     Current packs/day: 0.00     Types: Cigarettes     Quit date: 1988     Years since quittin.1     Passive exposure: Past    Smokeless tobacco: Former    Tobacco comments:     exposure to passive smoke   Vaping Use    Vaping status: Never Used   Substance and Sexual Activity    Alcohol use: Yes     Comment: occasionally    Drug use: Not Currently    Sexual activity: Yes     Partners: Female   Other Topics Concern    None   Social History Narrative    None     Social Drivers of Health     Financial Resource Strain: Not on file   Food Insecurity: No Food Insecurity (2024)    Nursing - Inadequate Food Risk Classification     Worried About Running Out of Food in the Last Year: Never true     Ran Out of Food in the Last Year: Never true     Ran Out of Food in the Last Year: Not on file   Transportation Needs: No Transportation Needs (2024)    PRAPARE - Transportation     Lack of Transportation (Medical): No     Lack of Transportation (Non-Medical): No   Physical Activity: Not on file   Stress: Not on file   Social Connections: Not on file   Intimate Partner Violence: Not on file   Housing  Stability: Low Risk  (7/12/2024)    Housing Stability Vital Sign     Unable to Pay for Housing in the Last Year: No     Number of Times Moved in the Last Year: 0     Homeless in the Last Year: No        REVIEW OF SYSTEMS:  GENERAL: No fever or chills  HEART: No chest pain, or palpitation  RESPIRATORY:  No acute SOB or cough  GI: No Nausea, vomit or diarrhea  NEUROLOGIC: No syncope or acute weakness    PHYSICAL EXAM:    /80 (Patient Position: Sitting, Cuff Size: Standard)   Pulse 85   Wt 109 kg (240 lb) Comment: verbal  BMI 29.21 kg/m²     VASCULAR EXAM  Dorsalis pedis  absent, Posterior tibial artery  +1.  The patient has class findings with skin atrophy, lack of digital hair, and nail dystrophy.  There is +1 lower extremity edema bilaterally.  Venous stasis skin changes noted BLE.    NEUROLOGIC EXAM  Sensation is intact to light touch.  Sensation is absent to 10gm monofilament.  No focal neurologic deficit.          DERMATOLOGIC EXAM:   Partial thick ulcer in right plantar foot.  It is about 0.7 X 0.9 X 0.1 cm.  Wound bed is granular.  Periwound maceration and mild keratosis noted.  No deep probing. No redness or swelling. No purulence or signs of infection.      MUSCULOSKELETAL EXAM:   No acute joint pain, edema, or redness.  No acute musculoskeletal problem.  Patient has deformity including hammertoes.  S/P left 4th ray amp and right TMA.

## 2024-12-16 ENCOUNTER — OFFICE VISIT (OUTPATIENT)
Dept: WOUND CARE | Facility: CLINIC | Age: 74
End: 2024-12-16
Payer: COMMERCIAL

## 2024-12-16 VITALS
RESPIRATION RATE: 16 BRPM | BODY MASS INDEX: 29.22 KG/M2 | DIASTOLIC BLOOD PRESSURE: 80 MMHG | HEART RATE: 72 BPM | HEIGHT: 76 IN | TEMPERATURE: 97.6 F | WEIGHT: 240 LBS | SYSTOLIC BLOOD PRESSURE: 156 MMHG

## 2024-12-16 DIAGNOSIS — E11.40 TYPE 2 DIABETES MELLITUS WITH DIABETIC NEUROPATHY, UNSPECIFIED WHETHER LONG TERM INSULIN USE (HCC): ICD-10-CM

## 2024-12-16 DIAGNOSIS — L97.412 ULCER OF HEEL AND MIDFOOT, RIGHT, WITH FAT LAYER EXPOSED (HCC): Primary | ICD-10-CM

## 2024-12-16 PROCEDURE — 99213 OFFICE O/P EST LOW 20 MIN: CPT | Performed by: PODIATRIST

## 2024-12-16 PROCEDURE — 29445 APPL RIGID TOT CNTC LEG CAST: CPT | Performed by: PODIATRIST

## 2024-12-16 RX ORDER — LIDOCAINE 40 MG/G
CREAM TOPICAL ONCE
Status: COMPLETED | OUTPATIENT
Start: 2024-12-16 | End: 2024-12-16

## 2024-12-16 RX ADMIN — LIDOCAINE: 40 CREAM TOPICAL at 10:59

## 2024-12-16 NOTE — PROGRESS NOTES
"     Patient ID: Won Gaitan Jr. is a 74 y.o. male Date of Birth 1950       Chief Complaint   Patient presents with    New Patient Visit     The patient developed a wound a couple months ago. He is not sure how he developed the wound but he is diabetic. He has been putting alginate AG on the wound with a dry dressing. The patient saw Dr. Ponce in his office last week.        Allergies:  Simvastatin and Itraconazole    Assessments:      Diagnosis ICD-10-CM Associated Orders   1. Ulcer of heel and midfoot, right, with fat layer exposed ([facility])  L97.412 Splint, Casting, Strapping     Wound cleansing and dressings Diabetic Ulcer Right;Plantar Foot     lidocaine (LMX) 4 % cream     Wound Procedure Treatment Diabetic Ulcer Right;Plantar Foot      2. Type 2 diabetes mellitus with diabetic neuropathy, unspecified whether long term insulin use ([facility])  E11.40 Splint, Casting, Strapping     Wound cleansing and dressings Diabetic Ulcer Right;Plantar Foot               Plan:   Reviewed medical records.  Patient was counseled and educated on the condition and the diagnosis.    2. The diagnosis, treatment options and prognosis were discussed with the patient.    3.  Pt not responding to treatment in the office.  Will try TCC.  TCC applied per protocol.   4. Stressed on patient compliance about proper off-loading, and staying off of foot to reduce the risk of infection, non-healing, and limb loss.    5. Patient will return in 1 week for re-evaluation.         Imaging: I have personally reviewed pertinent films in PACS  Labs, pathology, and Other Studies: I have personally reviewed pertinent reports.        Splint, Casting, Strapping    Date/Time: 12/16/2024 10:00 AM    Performed by: Satnam Ponce DPM  Authorized by: Satnam Ponce DPM  Universal Protocol:  Consent: Verbal consent obtained.  Risks and benefits: risks, benefits and alternatives were discussed  Consent given by: patient  Time out: Immediately prior to procedure a \"time out\" " was called to verify the correct patient, procedure, equipment, support staff and site/side marked as required.  Timeout called at: 12/16/2024 10:46 AM.  Patient understanding: patient states understanding of the procedure being performed  Patient identity confirmed: verbally with patient    Procedure details:     Laterality:  Right    Location:  Foot    Foot:  R footCast type:  Total contact        Supplies:  Cotton padding, sling, fiberglass and skin protective strip  Post-procedure details:     Patient tolerance of procedure:  Tolerated well, no immediate complications         Subjective:   HPI  The patient presents for evaluation and treatment of wound in right plantar foot.  He was treated at my office in the last 6 weeks without significant improvement.   No significant pain.  No redness or signs of infection.        The following portions of the patient's history were reviewed and updated as appropriate:   Patient Active Problem List   Diagnosis    Hyperlipidemia    Type 2 diabetes mellitus with diabetic neuropathy ([facility])    Atrial fibrillation with RVR ([facility])    Right middle lobe pulmonary nodule    Type 2 diabetes mellitus with other skin ulcer (CODE) ([facility])    Essential hypertension    Benign prostatic hyperplasia with lower urinary tract symptoms    Microalbuminuria    Ambulatory dysfunction    S/P transmetatarsal amputation of foot, right ([facility])    Sleep disturbance    Osteomyelitis of foot, acute ([facility])    Anxiety    S/P R ALT flap to right foot    Gait abnormality    Vasomotor rhinitis    Corns and callosities    Tinea unguium    Puncture wound of left foot with foreign body, subsequent encounter    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 ([facility])    Ventricular fibrillation ([facility])    Human immunodeficiency virus (HIV) disease ([facility])    Personal history of COVID-19    Bronchitis    S/P amputation of lesser toe, left ([facility])    Ulcer of toe of left  "foot, limited to breakdown of skin (HCC)    Medication management    Subconjunctival hemorrhage of right eye     Past Medical History:   Diagnosis Date    Arthritis     Atrial fibrillation (HCC)     Diagnosed 11/2018    Benign prostate hyperplasia 04/2002    Cellulitis     right lower leg     Colon polyp 2006    Diabetes mellitus (HCC)     Hearing aid worn     bilat    Hearing loss     90% loss left ear and 40% right ear    History of clubfoot     \"since birth\"    History of pneumonia     History of TIA (transient ischemic attack) 04/25/2017 11/30/18 pt denies    Hyperlipidemia 5/15/2014    Hypertension     Infectious viral hepatitis     \"cant remember type\"    Irregular heart beat     Afib    Neuropathy     both feet    Osteomyelitis (HCC)     right great toe    Pneumonia     Right middle lobe pulmonary nodule 11/6/2018    Seasickness     Teeth missing     Type 2 diabetes mellitus with diabetic neuropathy (HCC) 11/6/2018    Wears glasses     reading    Wound, open     bottom of right foot     Past Surgical History:   Procedure Laterality Date    BUNIONECTOMY Right 12/4/2018    Procedure: 5TH METATARSAL BONE PARTIAL RESECTION, FULL THICKNESS DEBRIDEMENT OF DIABETIC ULCER;  Surgeon: Shala Flynn DPM;  [facility];  Service: Podiatry    CLOSURE DELAYED PRIMARY Left 10/30/2023    Procedure: CLOSURE DELAYED PRIMARY left foot;  Surgeon: Minh Urena DPM;  [facility];  Service: Podiatry    CLUB FOOT RELEASE Bilateral     COLONOSCOPY      COMPLEX WOUND CLOSURE TO EXTREMITY  4/27/2021    Procedure: COMPLEX WOUND CLOSURE TO EXTREMITY: RIGHT FOOT;  Surgeon: Niraj Bojorquez MD;  [facility];  Service: Plastics    EXTERNAL FIXATOR APPLICATION Right 2/12/2021    Procedure: Application multiplane external fixation;  Surgeon: Harry Zamudio DPM;  [facility];  Service: Podiatry    FOOT HARDWARE REMOVAL Right 2/17/2021    Procedure: REMOVAL EXTERNAL FIXATOR;  Surgeon: Harry" DONOVAN Zamudio;  [facility];  Service: Podiatry    FOREIGN BODY REMOVAL Right 4/27/2021    Procedure: REMOVAL FOREIGN BODY EXTREMITY: RIGHT FOOT;  Surgeon: Niraj Bojorquez MD;  [facility];  Service: Plastics    INCISION AND DRAINAGE OF WOUND Right 2/17/2021    Procedure: INCISION AND DRAINAGE (I&D) EXTREMITY;  Surgeon: Harry Zamudio DPM;  [facility];  Service: Podiatry    INCISION AND DRAINAGE OF WOUND Left 10/26/2023    Procedure: INCISION AND DRAINAGE (I&D) EXTREMITY; WASHOUT; REMOVAL OF FOREIGN BODY;  Surgeon: Satnam Ponce DPM;  [facility];  Service: Podiatry    ORIF FOOT FRACTURE Right 3/4/2021    Procedure: VAC PLACEMENT; SCREW FIXATION RIGHT FOOT FUSION;  Surgeon: Harry Zamudio DPM;  [facility];  Service: Podiatry    CO AMPUTATION FOOT TRANSMETARSAL Right 2/22/2021    Procedure: AMPUTATION TRANSMETATARSAL (TMA), REMOVAL NAIL IM T2 ICF HARDWARE;  Surgeon: Harry Zamudio DPM;  [facility];  Service: Podiatry    CO AMPUTATION METATARSAL W/TOE SINGLE Left 11/29/2023    Procedure: LEFT FOURTH RAY RESECTION FOOT;  Surgeon: Harry Zamudio DPM;  [facility];  Service: Podiatry    CO ARTHRD MIDTARSL/TARS MLT/TRANSVRS W/OSTEOT Right 2/12/2021    Procedure: foot ARTHRODESIS/FUSION;  Surgeon: Harry Zamudio DPM;  [facility];  Service: Podiatry    CO DEBRIDEMENT BONE 1ST 20 SQ CM/< Right 12/2/2021    Procedure: DEBRIDEMENT OF TARSAL BONES WITH ANITBIODIC BEADS;  Surgeon: Harry Zamudio DPM;  [facility];  Service: Podiatry    CO LNGTH/SHRT TENDON LEG/ANKLE 1 TENDON SPX Right 2/12/2021    Procedure: LENGTHEN TIBIAL TENDON, TRANS HEEL CORD;  Surgeon: Harry Zamudio DPM;  [facility];  Service: Podiatry    CO MUSC MYOCUTANEOUS/FASCIOCUTANEOUS FLAP TRUNK Right 4/12/2021    Procedure: RECONSTRUCTION MICROSURGICAL W/ FREE FLAP;  Surgeon: Niraj Bojorquez MD;  [facility];  Service: Plastics    CO  MUSC MYOCUTANEOUS/FASCIOCUTANEOUS FLAP TRUNK Right 4/12/2021    Procedure: TAKEBACK RECONSTRUCTION MICROSURGICAL W/ FREE FLAP;  Surgeon: Niraj Bojorquez MD;  [facility];  Service: Plastics    MO MUSC MYOCUTANEOUS/FASCIOCUTANEOUS FLAP TRUNK Right 4/13/2021    Procedure: RECONSTRUCTION MICROSURGICAL W/ FREE FLAP, RE EXPLORATION, MICRO VASCULAR REVISION;  Surgeon: Niraj Bojorquez MD;  [facility];  Service: Plastics    MO OSTC PRTL EXOSTC/CONDYLC METAR HEAD Right 12/29/2020    Procedure: EXCISION EXOSTOSIS;  Surgeon: Harry Zamudio DPM;  [facility];  Service: Podiatry    MO SECONDARY CLOSURE SURG WOUND/DEHSN XTNSV/COMP Right 12/29/2020    Procedure: PRIMARY DELAYED CLOSURE;  Surgeon: Harry Zamudio DPM;  [facility];  Service: Podiatry    TOE AMPUTATION Right     partial great toe    TONSILLECTOMY      VAC DRESSING APPLICATION Right 3/1/2021    Procedure: APPLICATION VAC DRESSING EXTREMITY;  Surgeon: Niraj Bojorquez MD;  [facility];  Service: Plastics    VASECTOMY  1992    WOUND DEBRIDEMENT Right 3/1/2021    Procedure: DEBRIDEMENT LOWER EXTREMITY (WASH OUT);  Surgeon: Niraj Bojorquez MD;  [facility];  Service: Plastics    WOUND DEBRIDEMENT Right 4/7/2021    Procedure: DEBRIDEMENT RIGHT FOOT;  Surgeon: Niraj Bojorquez MD;  [facility];  Service: Plastics    WOUND DEBRIDEMENT Right 4/12/2021    Procedure: DEBRIDEMENT LOWER EXTREMITY (WASH OUT);  Surgeon: Nirja Bojorquez MD;  [facility];  Service: Plastics    WOUND DEBRIDEMENT Right 4/27/2021    Procedure: DEBRIDEMENT LOWER EXTREMITY (WASH OUT): RIGHT FOOT;  Surgeon: Niraj Bojorquez MD;  [facility];  Service: Plastics     Family History   Problem Relation Age of Onset    Diabetes Father         mellitus      Social History     Socioeconomic History    Marital status: /Civil Union     Spouse name: None    Number of  children: None    Years of education: None    Highest education level: None   Occupational History    None   Tobacco Use    Smoking status: Former     Current packs/day: 0.00     Types: Cigarettes     Quit date: 1988     Years since quittin.1     Passive exposure: Past    Smokeless tobacco: Former    Tobacco comments:     exposure to passive smoke   Vaping Use    Vaping status: Never Used   Substance and Sexual Activity    Alcohol use: Yes     Comment: occasionally    Drug use: Not Currently    Sexual activity: Yes     Partners: Female   Other Topics Concern    None   Social History Narrative    None     Social Drivers of Health     Financial Resource Strain: Not on file   Food Insecurity: No Food Insecurity (2024)    Nursing - Inadequate Food Risk Classification     Worried About Running Out of Food in the Last Year: Never true     Ran Out of Food in the Last Year: Never true     Ran Out of Food in the Last Year: Not on file   Transportation Needs: No Transportation Needs (2024)    PRAPARE - Transportation     Lack of Transportation (Medical): No     Lack of Transportation (Non-Medical): No   Physical Activity: Not on file   Stress: Not on file   Social Connections: Not on file   Intimate Partner Violence: Not on file   Housing Stability: Low Risk  (2024)    Housing Stability Vital Sign     Unable to Pay for Housing in the Last Year: No     Number of Times Moved in the Last Year: 0     Homeless in the Last Year: No        Current Outpatient Medications:     acetaminophen (Mapap Arthritis Pain) 650 mg CR tablet, Take 1 tablet (650 mg total) by mouth 2 (two) times a day, Disp: 60 tablet, Rfl: 1    atorvastatin (LIPITOR) 40 mg tablet, TAKE 1 TABLET (40 MG TOTAL) BY MOUTH DAILY, Disp: 90 tablet, Rfl: 2    Continuous Glucose Sensor (FreeStyle Taylor 3 Sensor) MISC, Use 1 each every 14 (fourteen) days, Disp: 1 each, Rfl: 0    diltiazem (CARDIZEM) 120 MG tablet, TAKE 1 TABLET (120 MG TOTAL) BY  "MOUTH TWICE A DAY, Disp: 180 tablet, Rfl: 1    Eliquis 5 MG, Take 1 tablet (5 mg total) by mouth 2 (two) times a day, Disp: 60 tablet, Rfl: 11    glucose blood (Accu-Chek Guide) test strip, USE DAILY, Disp: 100 strip, Rfl: 2    Insulin Glargine Solostar (Lantus SoloStar) 100 UNIT/ML SOPN, Inject 0.35 mL (35 Units total) under the skin daily at bedtime, Disp: 45 mL, Rfl: 1    insulin lispro (HumaLOG) 100 units/mL injection pen, Inject up to 20 Units under the skin 3 (three) times a day with meals, Disp: 15 mL, Rfl: 5    Insulin Pen Needle (Easy Comfort Pen Needles) 33G X 5 MM MISC, USE AS DIRECTED, Disp: 100 each, Rfl: 4    Lancets (onetouch ultrasoft) lancets, Use as instructed, Disp: 100 each, Rfl: 3    Lancets (onetouch ultrasoft) lancets, Use daily, Disp: 100 each, Rfl: 3    lisinopril (ZESTRIL) 40 mg tablet, Take 1 tablet (40 mg total) by mouth daily, Disp: 90 tablet, Rfl: 3    metFORMIN (GLUCOPHAGE-XR) 750 mg 24 hr tablet, Take 1 tablet (750 mg total) by mouth 2 (two) times a day with meals, Disp: 180 tablet, Rfl: 3    metoprolol succinate (TOPROL-XL) 100 mg 24 hr tablet, TAKE 1 TABLET (100 MG TOTAL) BY MOUTH 2 (TWO) TIMES A DAY, Disp: 180 tablet, Rfl: 0    mupirocin (BACTROBAN) 2 % ointment, Apply topically 3 (three) times a day, Disp: 15 g, Rfl: 0    tamsulosin (FLOMAX) 0.4 mg, Take 1 capsule (0.4 mg total) by mouth daily with dinner, Disp: 90 capsule, Rfl: 2  No current facility-administered medications for this visit.    Review of Systems   Constitutional:  Negative for chills and fever.   Respiratory:  Negative for cough and shortness of breath.    Cardiovascular:  Negative for chest pain.   Gastrointestinal:  Negative for nausea and vomiting.   Skin:  Positive for wound.   Neurological:  Positive for numbness. Negative for weakness.       Objective:  /80   Pulse 72   Temp 97.6 °F (36.4 °C)   Resp 16   Ht 6' 4\" (1.93 m)   Wt 109 kg (240 lb)   BMI 29.21 kg/m²   Pain Score: 0-No pain     Physical " Exam  Vitals and nursing note reviewed.   Constitutional:       General: He is not in acute distress.     Appearance: He is not toxic-appearing or diaphoretic.   HENT:      Head: Normocephalic and atraumatic.   Eyes:      Extraocular Movements: Extraocular movements intact.   Cardiovascular:      Rate and Rhythm: Normal rate and regular rhythm.      Pulses:           Dorsalis pedis pulses are 1+ on the right side.        Posterior tibial pulses are 0 on the right side.   Pulmonary:      Effort: Pulmonary effort is normal. No respiratory distress.   Musculoskeletal:         General: Deformity present. No signs of injury.      Cervical back: Normal range of motion and neck supple.      Right foot: No foot drop.      Comments: S/P partial foot amputation right.   Skin:     General: Skin is warm.      Capillary Refill: Capillary refill takes less than 2 seconds.      Coloration: Skin is not cyanotic or mottled.      Findings: Wound present. No abscess.      Nails: There is no clubbing.      Comments: Full thickness DFU in right plantar foot.  Wound bed is granular.  Skin maceration noted.  No signs of infection.  No deep probing.  See wound assessment and photo.   Neurological:      General: No focal deficit present.      Mental Status: He is alert and oriented to person, place, and time.      Cranial Nerves: No cranial nerve deficit.      Sensory: Sensory deficit present.      Motor: No weakness.      Coordination: Coordination normal.   Psychiatric:         Mood and Affect: Mood normal.         Behavior: Behavior normal.         Thought Content: Thought content normal.         Judgment: Judgment normal.           Wound 12/16/24 Diabetic Ulcer Foot Right;Plantar (Active)   Enter Parks score: Parks Grade 1: Partial or full-thickness ulcer (superficial) 12/16/24 1043   Wound Image Images linked 12/16/24 1039   Wound Description Pink 12/16/24 1043   Lavern-wound Assessment Intact;Callus 12/16/24 1043   Wound Length (cm)  0.8 cm 12/16/24 1043   Wound Width (cm) 0.9 cm 12/16/24 1043   Wound Depth (cm) 0.3 cm 12/16/24 1043   Wound Surface Area (cm^2) 0.72 cm^2 12/16/24 1043   Wound Volume (cm^3) 0.216 cm^3 12/16/24 1043   Calculated Wound Volume (cm^3) 0.22 cm^3 12/16/24 1043   Number of underminings 1 12/16/24 1043   Undermining 1 0.2 12/16/24 1043   Undermining 1 is depth extending from 12-12 12/16/24 1043   Drainage Amount Moderate 12/16/24 1043   Non-staged Wound Description Full thickness 12/16/24 1043   Wound packed? No 12/16/24 1043                 Lab Results   Component Value Date    HGBA1C 8.0 (A) 07/12/2024       Wound Instructions:  Orders Placed This Encounter   Procedures    Splint, Casting, Strapping     This order was created via procedure documentation    Wound cleansing and dressings Diabetic Ulcer Right;Plantar Foot     Shower no. DO NOT get cast wet.           Right foot wound:   Apply zinc to the periwound.  Apply acticoat 7 to the wound.     Cover with alginate and ABD.   Secure with kerlix and tape.  TCC applied by Dr. Ponce.   Change dressing weekly at the wound center.         Limit your walking for 24 hours until the cast sets.   Do not walk without the boot or the cast will crack.     Do not put any objects in the cast to scratch.             Follow up at the wound center in 72 hrs for first cast change and then one week.     Standing Status:   Future     Expiration Date:   12/23/2024    Wound Procedure Treatment Diabetic Ulcer Right;Plantar Foot     This order was created via procedure documentation             Satnam Ponce DPM

## 2024-12-16 NOTE — PROGRESS NOTES
Wound Procedure Treatment Diabetic Ulcer Right;Plantar Foot    Performed by: Tanner Nielson RN  Authorized by: Satnam Ponce DPM    Associated wounds:   Wound 12/16/24 Diabetic Ulcer Foot Right;Plantar  Wound cleansed with:  NSS  Applied primary dressing:  Acticoat and Calcium alginate  Applied secondary dressing:  ABD  Dressing secured with:  Kerlix and Tape  Offloading device appllied:  TCC  Comments:  Acticoat 7

## 2024-12-16 NOTE — PATIENT INSTRUCTIONS
Orders Placed This Encounter   Procedures    Splint, Casting, Strapping     This order was created via procedure documentation    Wound cleansing and dressings Diabetic Ulcer Right;Plantar Foot     Shower no. DO NOT get cast wet.           Right foot wound:   Apply zinc to the periwound.  Apply acticoat 7 to the wound.     Cover with alginate and ABD.   Secure with kerlix and tape.  TCC applied by Dr. Ponce.   Change dressing weekly at the wound center.         Limit your walking for 24 hours until the cast sets.   Do not walk without the boot or the cast will crack.     Do not put any objects in the cast to scratch.             Follow up at the wound center in 72 hrs for first cast change and then one week.     Standing Status:   Future     Expiration Date:   12/23/2024

## 2024-12-18 ENCOUNTER — OFFICE VISIT (OUTPATIENT)
Dept: WOUND CARE | Facility: CLINIC | Age: 74
End: 2024-12-18
Payer: COMMERCIAL

## 2024-12-18 VITALS
SYSTOLIC BLOOD PRESSURE: 132 MMHG | DIASTOLIC BLOOD PRESSURE: 70 MMHG | RESPIRATION RATE: 18 BRPM | TEMPERATURE: 97.7 F | HEART RATE: 68 BPM

## 2024-12-18 DIAGNOSIS — E11.40 TYPE 2 DIABETES MELLITUS WITH DIABETIC NEUROPATHY, UNSPECIFIED WHETHER LONG TERM INSULIN USE (HCC): ICD-10-CM

## 2024-12-18 DIAGNOSIS — L97.412 ULCER OF HEEL AND MIDFOOT, RIGHT, WITH FAT LAYER EXPOSED (HCC): Primary | ICD-10-CM

## 2024-12-18 PROCEDURE — 99203 OFFICE O/P NEW LOW 30 MIN: CPT | Performed by: NURSE PRACTITIONER

## 2024-12-18 PROCEDURE — 29445 APPL RIGID TOT CNTC LEG CAST: CPT | Performed by: NURSE PRACTITIONER

## 2024-12-18 RX ORDER — LIDOCAINE 40 MG/G
CREAM TOPICAL ONCE
Status: COMPLETED | OUTPATIENT
Start: 2024-12-18 | End: 2024-12-18

## 2024-12-18 RX ADMIN — LIDOCAINE: 40 CREAM TOPICAL at 10:00

## 2024-12-18 NOTE — PROGRESS NOTES
Name: Won Gaitan Jr.      : 1950      MRN: [id number]  Encounter Provider: KIRIT Long  Encounter Date: 2024   Encounter department: [facility] WOUND CARE  :  Assessment & Plan  Ulcer of heel and midfoot, right, with fat layer exposed ([facility])    Orders:    Wound cleansing and dressings Diabetic Ulcer Right;Plantar Foot; Future    lidocaine (LMX) 4 % cream    Wound Procedure Treatment Diabetic Ulcer Right;Plantar Foot    Type 2 diabetes mellitus with diabetic neuropathy, unspecified whether long term insulin use ([facility])    Lab Results   Component Value Date    HGBA1C 8.0 (A) 2024       Orders:    Wound cleansing and dressings Diabetic Ulcer Right;Plantar Foot; Future    Plan:  1.  F/U visit.  Wound cleansed, saline and gauze.  Wound improving with increased healthy granulation tissue present in wound bed.  Continue current plan of care.  TCC reapplied today.  2.  A1C results reviewed with the patient today.  Patient is to continue to follow recommendations to achieve tight glycemic control  3.  Patient will follow-up on Monday for next TCC change.    History of Present Illness   Chief Complaint   Patient presents with    Follow Up Wound Care Visit     R TCC change. Arrived with TCC intact and wearing Cast boot   F/u visit for Parks grade 1 diabetic foot ulcer of his right heel.  No new complaints.  He denies any pain, fevers, or chills.  Tolerating TCC.      Objective   /70   Pulse 68   Temp 97.7 °F (36.5 °C)   Resp 18     Wound 24 Diabetic Ulcer Foot Right;Plantar (Active)   Enter Parks score: Parks Grade 1: Partial or full-thickness ulcer (superficial) 24 1044   Wound Image   24 1038   Wound Description Pink;Yellow 24 1044   Lavern-wound Assessment Intact 24 1044   Wound Length (cm) 1 cm 24 1044   Wound Width (cm) 0.8 cm 24 1044   Wound Depth (cm) 0.4 cm 24 1044   Wound Surface Area (cm^2) 0.8 cm^2  "12/18/24 1044   Wound Volume (cm^3) 0.32 cm^3 12/18/24 1044   Calculated Wound Volume (cm^3) 0.32 cm^3 12/18/24 1044   Change in Wound Size % -45.45 12/18/24 1044   Number of underminings 1 12/16/24 1043   Undermining 1 0.2 12/16/24 1043   Undermining 1 is depth extending from 12-12 12/16/24 1043   Drainage Amount Small 12/18/24 1044   Drainage Description Serosanguineous 12/18/24 1044   Non-staged Wound Description Full thickness 12/18/24 1044   Wound packed? No 12/16/24 1043       Cast Application    Date/Time: 12/18/2024 9:30 AM    Performed by: KIRIT Long  Authorized by: KIRIT Long  Coker Protocol:  procedure performed by consultantConsent: Verbal consent obtained.  Consent given by: patient  Time out: Immediately prior to procedure a \"time out\" was called to verify the correct patient, procedure, equipment, support staff and site/side marked as required.  Timeout called at: 12/18/2024 1:31 PM.  Patient understanding: patient states understanding of the procedure being performed  Patient consent: the patient's understanding of the procedure matches consent given  Procedure consent matches procedure scheduled: n/a.  Relevant documents present and verified: n/a.  Test results available and properly labeled: n/a.  Site marked: the operative site was marked  Imaging studies available: n/a.  Required blood products, implants, devices, and special equipment available: n/a.  Patient identity confirmed: verbally with patient    Pre-procedure details:     Sensation:  Unchanged  Procedure details:     Laterality:  Right    Location:  Foot    Foot:  R footCast type:  Total contact      Post-procedure details:     Pain:  Unchanged    Sensation:  Unchanged    Patient tolerance of procedure:  Tolerated well, no immediate complications             "

## 2024-12-18 NOTE — PROGRESS NOTES
Wound Procedure Treatment Diabetic Ulcer Right;Plantar Foot    Performed by: Nora Owen RN  Authorized by: KIRIT Long    Associated wounds:   Wound 12/16/24 Diabetic Ulcer Foot Right;Plantar  Wound cleansed with:  NSS  Applied to periwound:  Zinc oxide paste  Applied primary dressing:  Acticoat and Calcium alginate  Applied secondary dressing:  Gauze and ABD  Dressing secured with:  Kerlix and Tape  Offloading device appllied:  TCC  Comments:  Acticoat 7 to wound bed followed by calcium alginate  TCC applied by KIRIT Mckenzie with 20 minute drying time prior to discharge  Klamath villatoro applied at end of drying time

## 2024-12-18 NOTE — PATIENT INSTRUCTIONS
Orders Placed This Encounter   Procedures    Cast Application     This order was created via procedure documentation    Wound cleansing and dressings Diabetic Ulcer Right;Plantar Foot     Diabetic Ulcer Right;Plantar Foot   Shower no. DO NOT get cast wet.         Wound Care Right foot wound:   Cleanse with NSS or mild soap and water and rinse well with clear water  Apply zinc oxide to the periwound skin.  Apply acticoat 7 to the wound.     Cover with alginate and ABD.   Secure with kerlix and tape.  TCC applied by Briana Simpson. CRNP   Cast boot applied  Change dressing weekly at the wound center.     Continue to use crutches as needed for stability and offloading     Limit your walking for 24 hours while the cast is still hardening    Do not walk without the cast boot to help prevent the cast from cracking.      Do not put any objects in the cast to scratch.     Follow up with  at the wound center on Monday 12/23     Standing Status:   Future     Expiration Date:   12/25/2024    Wound Procedure Treatment Diabetic Ulcer Right;Plantar Foot     This order was created via procedure documentation

## 2024-12-18 NOTE — ASSESSMENT & PLAN NOTE
Lab Results   Component Value Date    HGBA1C 8.0 (A) 07/12/2024       Orders:    Wound cleansing and dressings Diabetic Ulcer Right;Plantar Foot; Future

## 2024-12-23 ENCOUNTER — OFFICE VISIT (OUTPATIENT)
Dept: WOUND CARE | Facility: CLINIC | Age: 74
End: 2024-12-23
Payer: COMMERCIAL

## 2024-12-23 ENCOUNTER — TELEMEDICINE (OUTPATIENT)
Dept: FAMILY MEDICINE CLINIC | Facility: CLINIC | Age: 74
End: 2024-12-23

## 2024-12-23 VITALS
SYSTOLIC BLOOD PRESSURE: 152 MMHG | HEART RATE: 68 BPM | RESPIRATION RATE: 16 BRPM | DIASTOLIC BLOOD PRESSURE: 88 MMHG | TEMPERATURE: 98 F

## 2024-12-23 DIAGNOSIS — E11.40 TYPE 2 DIABETES MELLITUS WITH DIABETIC NEUROPATHY, WITH LONG-TERM CURRENT USE OF INSULIN (HCC): Primary | Chronic | ICD-10-CM

## 2024-12-23 DIAGNOSIS — Z79.4 TYPE 2 DIABETES MELLITUS WITH DIABETIC NEUROPATHY, WITH LONG-TERM CURRENT USE OF INSULIN (HCC): Primary | Chronic | ICD-10-CM

## 2024-12-23 DIAGNOSIS — Z79.899 MEDICATION MANAGEMENT: ICD-10-CM

## 2024-12-23 DIAGNOSIS — L97.412 ULCER OF HEEL AND MIDFOOT, RIGHT, WITH FAT LAYER EXPOSED (HCC): Primary | ICD-10-CM

## 2024-12-23 DIAGNOSIS — E11.40 TYPE 2 DIABETES MELLITUS WITH DIABETIC NEUROPATHY, UNSPECIFIED WHETHER LONG TERM INSULIN USE (HCC): ICD-10-CM

## 2024-12-23 PROCEDURE — PBNCHG PB NO CHARGE PLACEHOLDER: Performed by: PHARMACIST

## 2024-12-23 PROCEDURE — 29445 APPL RIGID TOT CNTC LEG CAST: CPT | Performed by: PODIATRIST

## 2024-12-23 NOTE — ASSESSMENT & PLAN NOTE
- Most recent A1c: above goal, but not reflective of current medication regimen. Anticipate improvement with new labs  - CGM TIR: slightly below goal; TBR is at goal and indicates no hypoglycemia  Glycemic control stable. No medication changes warranted. Patient has scheduled follow-up PCP upcoming.

## 2024-12-23 NOTE — PROGRESS NOTES
Wound Procedure Treatment Diabetic Ulcer Right;Plantar Foot    Performed by: Stephanie White RN  Authorized by: Satnam Ponce DPM    Associated wounds:   Wound 12/16/24 Diabetic Ulcer Foot Right;Plantar  Wound cleansed with:  Wound aggrssively cleansed with NSS and gauze  Applied primary dressing:  Acticoat, Silver and Calcium alginate  Applied secondary dressing:  Gauze and ABD  Dressing secured with:  Kacy and Tape  Offloading device appllied:  Felt padding 1/4 inch  Comments:  Double Layer 1/4 inch felt applied to heel.

## 2024-12-23 NOTE — PROGRESS NOTES
[facility]   Juan Manuel Villalobos, Ailyn     Won Gaitan Jr. is a 74 y.o. male who was referred to the clinical pharmacist by Pieter Torres DO for diabetes. Patient presents via telephone for follow-up.      Telemedicine consent  The patient was identified by name and date of birth. Won Gaitan Jr. was informed that this is a telemedicine visit and that the visit is being conducted through the [employer] Teams platform. He agrees to proceed..  My office door was closed. No one else was in the room.  He acknowledged consent and understanding of privacy and security of the telephone platform. The patient has agreed to participate and understands they can discontinue the visit at any time.       Assessment & Plan  Type 2 diabetes mellitus with diabetic neuropathy, with long-term current use of insulin (HCC)  - Most recent A1c: above goal, but not reflective of current medication regimen. Anticipate improvement with new labs  - CGM TIR: slightly below goal; TBR is at goal and indicates no hypoglycemia  Glycemic control stable. No medication changes warranted. Patient has scheduled follow-up PCP upcoming.   Medication management    Follow-up:   Follow-up with pharmacist in 6 weeks  Next PCP visit: 1/13/2025    - Home Monitoring Records: CGM data.  - Labs: A1c due now    Subjective:     Won Gaitan Jr. is a 74 y.o. male with a history of type 2 diabetes with long term use of insulin. Diabetes course has been improving with treatment. Denies recent illness or hospitalizations. He denies any polyuria, polydipsia, polyphagia, fatigue and blurry vision. Denies recent hypoglycemic episodes.     Patient reports adherence to his insulin regimen. Has been to several holiday celebrations, but otherwise trying to follow diabetic diet.     Patient is using a combination of metformin 1,000 mg (0.5 tab) and 500 mg (0.5 tab) to achieve his metformin 750 mg dose.     Has been able to connect to  [employer] and is frequently monitoring his glucose.       DM Self-Management:  - Self-monitoring: is performed regularly.  He checks glucose levels very frequently using CGM (> 30 scans/day). He uses smartphone as . Patient is connected to clinic [employer] account.   - Hypoglycemic episodes: none recently - History of Level 3/severe hypoglycemic event: none   - Hypoglycemia symptoms: weak, shaky; symptomatic with glucose levels 80-90  - Treatment of hypoglycemia: discussed treatment   Glucometer: Yes, Brand: Accu-Check Guide Me   CGM: Yes, Brand: FreeStyle Taylor 3     Current DM Regimen:  Humalog 20 units SQ AC  Lantus 35 units SQ HS (0.29 units/kg)  Metformin 750 mg BID    DM History:  Microvascular complications: nephropathy and peripheral neuropathy; S/P transmetatarsal amputation of R foot; S/P amputation of L lesser toe   Cardiovascular complications: none  - Statin: Yes   - ACEi/ARB: Yes    Previous DM medications:   Januvia  Glimepiride  Ozempic (cost)    CV Risk Factors:  - HF: No  - HTN: Yes - Obesity: No; Overweight   - CKD: normal GFR with albuminuria - Dyslipidemia: Yes - Smoking: Former     - Thyroid disorder: No  - History of pancreatitis: No    Drug Therapy Problems:  - Medication Adherence: Responsible for medication management: patient and spouse. Patient is noncompliant some of the time; takes insulin as scheduled, however; has history of self-adjusting insulin doses. Denies any side effects from current medications.    Preventative Care:  Ophthamology: Overdue  Podiatry: Up to date; followed by Podiatry   Kidney Health: Up to date    CGM Data Review:     Device: FreeStyle Taylor 3  Dates: 12/10 - 12/23/2024  Usage: 95%  Average glucose (mg/dL): 166  GMI (approx. lab A1c): 7.3%  Glycemic variability (CV): 29.1%    Glycemic patterns:  hyperglycemia throughout the day, peak at lunchtime meal  Hypoglycemia: none       Metric CGM Target   Time above range (TAR) Very High >250 mg/dL 6 <5%   Time  above range (TAR) High >180 mg/dL 29 <25%   Time in range (TIR)  mg/dL 65 >70%   Time below range (TBR) Low <70 mg/dL 0 <4%    Time below range (TBR)Very Low <54 mg/dL 0 <1%     More than 72 hours of data was reviewed. Report to be scanned to chart.          Meds/Allergies     Current Outpatient Medications:     acetaminophen (Mapap Arthritis Pain) 650 mg CR tablet, Take 1 tablet (650 mg total) by mouth 2 (two) times a day, Disp: 60 tablet, Rfl: 1    atorvastatin (LIPITOR) 40 mg tablet, TAKE 1 TABLET (40 MG TOTAL) BY MOUTH DAILY, Disp: 90 tablet, Rfl: 2    Continuous Glucose Sensor (FreeStyle Taylor 3 Sensor) MISC, Use 1 each every 14 (fourteen) days, Disp: 1 each, Rfl: 0    diltiazem (CARDIZEM) 120 MG tablet, TAKE 1 TABLET (120 MG TOTAL) BY MOUTH TWICE A DAY, Disp: 180 tablet, Rfl: 1    Eliquis 5 MG, Take 1 tablet (5 mg total) by mouth 2 (two) times a day, Disp: 60 tablet, Rfl: 11    glucose blood (Accu-Chek Guide) test strip, USE DAILY, Disp: 100 strip, Rfl: 2    Insulin Glargine Solostar (Lantus SoloStar) 100 UNIT/ML SOPN, Inject 0.35 mL (35 Units total) under the skin daily at bedtime, Disp: 45 mL, Rfl: 1    insulin lispro (HumaLOG) 100 units/mL injection pen, Inject up to 20 Units under the skin 3 (three) times a day with meals, Disp: 15 mL, Rfl: 5    Insulin Pen Needle (Easy Comfort Pen Needles) 33G X 5 MM MISC, USE AS DIRECTED, Disp: 100 each, Rfl: 4    Lancets (onetouch ultrasoft) lancets, Use as instructed, Disp: 100 each, Rfl: 3    Lancets (onetouch ultrasoft) lancets, Use daily, Disp: 100 each, Rfl: 3    lisinopril (ZESTRIL) 40 mg tablet, Take 1 tablet (40 mg total) by mouth daily, Disp: 90 tablet, Rfl: 3    metFORMIN (GLUCOPHAGE-XR) 750 mg 24 hr tablet, Take 1 tablet (750 mg total) by mouth 2 (two) times a day with meals, Disp: 180 tablet, Rfl: 3    metoprolol succinate (TOPROL-XL) 100 mg 24 hr tablet, TAKE 1 TABLET (100 MG TOTAL) BY MOUTH 2 (TWO) TIMES A DAY, Disp: 180 tablet, Rfl: 0    mupirocin  (BACTROBAN) 2 % ointment, Apply topically 3 (three) times a day, Disp: 15 g, Rfl: 0    tamsulosin (FLOMAX) 0.4 mg, Take 1 capsule (0.4 mg total) by mouth daily with dinner, Disp: 90 capsule, Rfl: 2    Allergies   Allergen Reactions    Simvastatin Myalgia    Itraconazole Other (See Comments)     Pt denies knowledge of allergy..      Objective     Pertinent Lab Data:    Lab Results   Component Value Date    HGBA1C 8.0 (A) 07/12/2024      Administrative Statements   Pharmacist Tracking Tool:   Reason For Outreach: Embedded Pharmacist  Demographics:  Intervention Method: Phone  Type of Intervention: Follow-Up  Topics Addressed: Diabetes  Pharmacologic Interventions: Prevent or Manage HEAVEN  Non-Pharmacologic Interventions: Personal CGM  Time:  Direct Patient Care:  25  mins  Care Coordination:  15  mins  Recommendation Recipient: Patient/Caregiver  Outcome: Accepted    Juan Manuel Villalobos, PharmD  Clinical Integration Pharmacist  [facility]

## 2024-12-23 NOTE — PATIENT INSTRUCTIONS
Orders Placed This Encounter   Procedures    Wound cleansing and dressings Diabetic Ulcer Right;Plantar Foot     Shower no. DO NOT get cast wet.       Right foot wound:   Double Layer 1/4 felt pad applied to heel.   Apply zinc to the periwound.  Apply acticoat 7 to the wound.     Cover with alginate and ABD.   Secure with kerlix and tape.  TCC applied by Dr. Ponce.   Change dressing weekly at the wound center.         Limit your walking for 24 hours until the cast sets.   Do not walk without the boot or the cast will crack.      Do not put any objects in the cast to scratch.       Follow up in one week.     Standing Status:   Future     Expiration Date:   12/30/2024

## 2024-12-23 NOTE — PROGRESS NOTES
"     Patient ID: Won Gaitan Jr. is a 74 y.o. male Date of Birth 1950       Chief Complaint   Patient presents with    Follow Up Wound Care Visit     Follow up visit for wound to right midfoot. Pt arrives with TCC in place.  Pt denies any issues or concerns since last visit.        Allergies:  Simvastatin and Itraconazole    Assessments:      Diagnosis ICD-10-CM Associated Orders   1. Ulcer of heel and midfoot, right, with fat layer exposed ([facility])  L97.412 Wound cleansing and dressings Diabetic Ulcer Right;Plantar Foot     Splint, Casting, Strapping     Wound Procedure Treatment Diabetic Ulcer Right;Plantar Foot      2. Type 2 diabetes mellitus with diabetic neuropathy, unspecified whether long term insulin use ([facility])  E11.40 Splint, Casting, Strapping     Wound Procedure Treatment Diabetic Ulcer Right;Plantar Foot               Plan:   Reviewed medical records.  Patient was counseled and educated on the condition and the diagnosis.    2. The diagnosis, treatment options and prognosis were discussed with the patient.    3.  No significant improvement.  Pad applied for further off-loading.  Continue TCC and it was applied per protocol.   4. Stressed on patient compliance about proper off-loading, and staying off of foot to reduce the risk of infection, non-healing, and limb loss.    5. Patient will return in 1 week for re-evaluation.         Imaging: I have personally reviewed pertinent films in PACS  Labs, pathology, and Other Studies: I have personally reviewed pertinent reports.        Splint, Casting, Strapping    Date/Time: 12/23/2024 11:00 AM    Performed by: Satnam Ponce DPM  Authorized by: Satnam Ponce DPM  Universal Protocol:  Consent: Verbal consent obtained.  Risks and benefits: risks, benefits and alternatives were discussed  Consent given by: patient  Time out: Immediately prior to procedure a \"time out\" was called to verify the correct patient, procedure, equipment, support staff and site/side marked as " required.  Timeout called at: 12/23/2024 11:33 AM.  Patient understanding: patient states understanding of the procedure being performed  Patient identity confirmed: verbally with patient    Procedure details:     Laterality:  Right    Location:  Foot    Foot:  R footCast type:  Total contact        Supplies:  Cotton padding, sling, fiberglass and skin protective strip  Post-procedure details:     Patient tolerance of procedure:  Tolerated well, no immediate complications         Subjective:   HPI  The patient presents for evaluation and treatment of wound in right plantar foot.  Tolerated TCC well.  No significant pain.        The following portions of the patient's history were reviewed and updated as appropriate:   Patient Active Problem List   Diagnosis    Hyperlipidemia    Type 2 diabetes mellitus with diabetic neuropathy ([facility])    Atrial fibrillation with RVR ([facility])    Right middle lobe pulmonary nodule    Type 2 diabetes mellitus with other skin ulcer (CODE) ([facility])    Essential hypertension    Benign prostatic hyperplasia with lower urinary tract symptoms    Microalbuminuria    Ambulatory dysfunction    S/P transmetatarsal amputation of foot, right ([facility])    Sleep disturbance    Osteomyelitis of foot, acute ([facility])    Anxiety    S/P R ALT flap to right foot    Gait abnormality    Vasomotor rhinitis    Corns and callosities    Tinea unguium    Puncture wound of left foot with foreign body, subsequent encounter    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 ([facility])    Ventricular fibrillation (HCC)    Human immunodeficiency virus (HIV) disease ([facility])    Personal history of COVID-19    Bronchitis    S/P amputation of lesser toe, left ([facility])    Ulcer of toe of left foot, limited to breakdown of skin ([facility])    Medication management    Subconjunctival hemorrhage of right eye     Past Medical History:   Diagnosis Date    Arthritis     Atrial fibrillation ([facility])      "Diagnosed 11/2018    Benign prostate hyperplasia 04/2002    Cellulitis     right lower leg     Colon polyp 2006    Diabetes mellitus (HCC)     Hearing aid worn     bilat    Hearing loss     90% loss left ear and 40% right ear    History of clubfoot     \"since birth\"    History of pneumonia     History of TIA (transient ischemic attack) 04/25/2017 11/30/18 pt denies    Hyperlipidemia 5/15/2014    Hypertension     Infectious viral hepatitis     \"cant remember type\"    Irregular heart beat     Afib    Neuropathy     both feet    Osteomyelitis (HCC)     right great toe    Pneumonia     Right middle lobe pulmonary nodule 11/6/2018    Seasickness     Teeth missing     Type 2 diabetes mellitus with diabetic neuropathy (HCC) 11/6/2018    Wears glasses     reading    Wound, open     bottom of right foot     Past Surgical History:   Procedure Laterality Date    BUNIONECTOMY Right 12/4/2018    Procedure: 5TH METATARSAL BONE PARTIAL RESECTION, FULL THICKNESS DEBRIDEMENT OF DIABETIC ULCER;  Surgeon: Shala Flynn DPM;  [facility];  Service: Podiatry    CLOSURE DELAYED PRIMARY Left 10/30/2023    Procedure: CLOSURE DELAYED PRIMARY left foot;  Surgeon: Minh Urena DPM;  [facility];  Service: Podiatry    CLUB FOOT RELEASE Bilateral     COLONOSCOPY      COMPLEX WOUND CLOSURE TO EXTREMITY  4/27/2021    Procedure: COMPLEX WOUND CLOSURE TO EXTREMITY: RIGHT FOOT;  Surgeon: Niraj Bojorquez MD;  [facility];  Service: Plastics    EXTERNAL FIXATOR APPLICATION Right 2/12/2021    Procedure: Application multiplane external fixation;  Surgeon: Harry Zamudio DPM;  [facility];  Service: Podiatry    FOOT HARDWARE REMOVAL Right 2/17/2021    Procedure: REMOVAL EXTERNAL FIXATOR;  Surgeon: Harry Zamudio DPM;  [facility];  Service: Podiatry    FOREIGN BODY REMOVAL Right 4/27/2021    Procedure: REMOVAL FOREIGN BODY EXTREMITY: RIGHT FOOT;  Surgeon: Niraj Bojorquez MD;  " [facility];  Service: Plastics    INCISION AND DRAINAGE OF WOUND Right 2/17/2021    Procedure: INCISION AND DRAINAGE (I&D) EXTREMITY;  Surgeon: Harry Zamudio DPM;  [facility];  Service: Podiatry    INCISION AND DRAINAGE OF WOUND Left 10/26/2023    Procedure: INCISION AND DRAINAGE (I&D) EXTREMITY; WASHOUT; REMOVAL OF FOREIGN BODY;  Surgeon: Satnam Ponce DPM;  [facility];  Service: Podiatry    ORIF FOOT FRACTURE Right 3/4/2021    Procedure: VAC PLACEMENT; SCREW FIXATION RIGHT FOOT FUSION;  Surgeon: Harry Zamudio DPM;  [facility];  Service: Podiatry    OR AMPUTATION FOOT TRANSMETARSAL Right 2/22/2021    Procedure: AMPUTATION TRANSMETATARSAL (TMA), REMOVAL NAIL IM T2 ICF HARDWARE;  Surgeon: Harry Zamudio DPM;  [facility];  Service: Podiatry    OR AMPUTATION METATARSAL W/TOE SINGLE Left 11/29/2023    Procedure: LEFT FOURTH RAY RESECTION FOOT;  Surgeon: Harry Zamudio DPM;  [facility];  Service: Podiatry    OR ARTHRD MIDTARSL/TARS MLT/TRANSVRS W/OSTEOT Right 2/12/2021    Procedure: foot ARTHRODESIS/FUSION;  Surgeon: Harry Zamudio DPM;  [facility];  Service: Podiatry    OR DEBRIDEMENT BONE 1ST 20 SQ CM/< Right 12/2/2021    Procedure: DEBRIDEMENT OF TARSAL BONES WITH ANITBIODIC BEADS;  Surgeon: Harry Zamudio DPM;  [facility];  Service: Podiatry    OR LNGTH/SHRT TENDON LEG/ANKLE 1 TENDON SPX Right 2/12/2021    Procedure: LENGTHEN TIBIAL TENDON, TRANS HEEL CORD;  Surgeon: Harry Zamudio DPM;  [facility];  Service: Podiatry    OR MUSC MYOCUTANEOUS/FASCIOCUTANEOUS FLAP TRUNK Right 4/12/2021    Procedure: RECONSTRUCTION MICROSURGICAL W/ FREE FLAP;  Surgeon: Niraj Bojorquez MD;  [facility];  Service: Plastics    OR MUSC MYOCUTANEOUS/FASCIOCUTANEOUS FLAP TRUNK Right 4/12/2021    Procedure: TAKEBACK RECONSTRUCTION MICROSURGICAL W/ FREE FLAP;  Surgeon: Niraj Bojorquez MD;  [facility];   Service: Plastics    PA MUSC MYOCUTANEOUS/FASCIOCUTANEOUS FLAP TRUNK Right 4/13/2021    Procedure: RECONSTRUCTION MICROSURGICAL W/ FREE FLAP, RE EXPLORATION, MICRO VASCULAR REVISION;  Surgeon: Niraj Bojorquez MD;  [facility];  Service: Plastics    PA OSTC PRTL EXOSTC/CONDYLC METAR HEAD Right 12/29/2020    Procedure: EXCISION EXOSTOSIS;  Surgeon: Harry Zamudio DPM;  [facility];  Service: Podiatry    PA SECONDARY CLOSURE SURG WOUND/DEHSN XTNSV/COMP Right 12/29/2020    Procedure: PRIMARY DELAYED CLOSURE;  Surgeon: Harry Zamudio DPM;  [facility];  Service: Podiatry    TOE AMPUTATION Right     partial great toe    TONSILLECTOMY      VAC DRESSING APPLICATION Right 3/1/2021    Procedure: APPLICATION VAC DRESSING EXTREMITY;  Surgeon: Niraj Bojorquez MD;  [facility];  Service: Plastics    VASECTOMY  1992    WOUND DEBRIDEMENT Right 3/1/2021    Procedure: DEBRIDEMENT LOWER EXTREMITY (WASH OUT);  Surgeon: Niraj Bojorquez MD;  [facility];  Service: Plastics    WOUND DEBRIDEMENT Right 4/7/2021    Procedure: DEBRIDEMENT RIGHT FOOT;  Surgeon: Niraj Bojorquez MD;  [facility];  Service: Plastics    WOUND DEBRIDEMENT Right 4/12/2021    Procedure: DEBRIDEMENT LOWER EXTREMITY (WASH OUT);  Surgeon: Niraj Bojorquez MD;  [facility];  Service: Plastics    WOUND DEBRIDEMENT Right 4/27/2021    Procedure: DEBRIDEMENT LOWER EXTREMITY (WASH OUT): RIGHT FOOT;  Surgeon: Niraj Bojoruqez MD;  [facility];  Service: Plastics     Family History   Problem Relation Age of Onset    Diabetes Father         mellitus      Social History     Socioeconomic History    Marital status: /Civil Union     Spouse name: None    Number of children: None    Years of education: None    Highest education level: None   Occupational History    None   Tobacco Use    Smoking status: Former     Current packs/day: 0.00     Types:  Cigarettes     Quit date: 1988     Years since quittin.1     Passive exposure: Past    Smokeless tobacco: Former    Tobacco comments:     exposure to passive smoke   Vaping Use    Vaping status: Never Used   Substance and Sexual Activity    Alcohol use: Yes     Comment: occasionally    Drug use: Not Currently    Sexual activity: Yes     Partners: Female   Other Topics Concern    None   Social History Narrative    None     Social Drivers of Health     Financial Resource Strain: Not on file   Food Insecurity: No Food Insecurity (2024)    Nursing - Inadequate Food Risk Classification     Worried About Running Out of Food in the Last Year: Never true     Ran Out of Food in the Last Year: Never true     Ran Out of Food in the Last Year: Not on file   Transportation Needs: No Transportation Needs (2024)    PRAPARE - Transportation     Lack of Transportation (Medical): No     Lack of Transportation (Non-Medical): No   Physical Activity: Not on file   Stress: Not on file   Social Connections: Not on file   Intimate Partner Violence: Not on file   Housing Stability: Low Risk  (2024)    Housing Stability Vital Sign     Unable to Pay for Housing in the Last Year: No     Number of Times Moved in the Last Year: 0     Homeless in the Last Year: No        Current Outpatient Medications:     acetaminophen (Mapap Arthritis Pain) 650 mg CR tablet, Take 1 tablet (650 mg total) by mouth 2 (two) times a day, Disp: 60 tablet, Rfl: 1    atorvastatin (LIPITOR) 40 mg tablet, TAKE 1 TABLET (40 MG TOTAL) BY MOUTH DAILY, Disp: 90 tablet, Rfl: 2    Continuous Glucose Sensor (FreeStyle Taylor 3 Sensor) MISC, Use 1 each every 14 (fourteen) days, Disp: 1 each, Rfl: 0    diltiazem (CARDIZEM) 120 MG tablet, TAKE 1 TABLET (120 MG TOTAL) BY MOUTH TWICE A DAY, Disp: 180 tablet, Rfl: 1    Eliquis 5 MG, Take 1 tablet (5 mg total) by mouth 2 (two) times a day, Disp: 60 tablet, Rfl: 11    glucose blood (Accu-Chek Guide) test strip,  USE DAILY, Disp: 100 strip, Rfl: 2    Insulin Glargine Solostar (Lantus SoloStar) 100 UNIT/ML SOPN, Inject 0.35 mL (35 Units total) under the skin daily at bedtime, Disp: 45 mL, Rfl: 1    insulin lispro (HumaLOG) 100 units/mL injection pen, Inject up to 20 Units under the skin 3 (three) times a day with meals, Disp: 15 mL, Rfl: 5    Insulin Pen Needle (Easy Comfort Pen Needles) 33G X 5 MM MISC, USE AS DIRECTED, Disp: 100 each, Rfl: 4    Lancets (onetouch ultrasoft) lancets, Use as instructed, Disp: 100 each, Rfl: 3    Lancets (onetouch ultrasoft) lancets, Use daily, Disp: 100 each, Rfl: 3    lisinopril (ZESTRIL) 40 mg tablet, Take 1 tablet (40 mg total) by mouth daily, Disp: 90 tablet, Rfl: 3    metFORMIN (GLUCOPHAGE-XR) 750 mg 24 hr tablet, Take 1 tablet (750 mg total) by mouth 2 (two) times a day with meals, Disp: 180 tablet, Rfl: 3    metoprolol succinate (TOPROL-XL) 100 mg 24 hr tablet, TAKE 1 TABLET (100 MG TOTAL) BY MOUTH 2 (TWO) TIMES A DAY, Disp: 180 tablet, Rfl: 0    mupirocin (BACTROBAN) 2 % ointment, Apply topically 3 (three) times a day, Disp: 15 g, Rfl: 0    tamsulosin (FLOMAX) 0.4 mg, Take 1 capsule (0.4 mg total) by mouth daily with dinner, Disp: 90 capsule, Rfl: 2    Review of Systems   Constitutional:  Negative for chills and fever.   Respiratory:  Negative for cough and shortness of breath.    Cardiovascular:  Negative for chest pain.   Gastrointestinal:  Negative for nausea and vomiting.   Skin:  Positive for wound.   Neurological:  Positive for numbness. Negative for weakness.       Objective:  /88   Pulse 68   Temp 98 °F (36.7 °C) (Tympanic)   Resp 16   Pain Score: 0-No pain     Physical Exam  Vitals and nursing note reviewed.   Constitutional:       General: He is not in acute distress.     Appearance: He is not toxic-appearing or diaphoretic.   HENT:      Head: Normocephalic and atraumatic.   Cardiovascular:      Rate and Rhythm: Normal rate and regular rhythm.      Pulses:            Dorsalis pedis pulses are 1+ on the right side.        Posterior tibial pulses are 0 on the right side.   Pulmonary:      Effort: Pulmonary effort is normal. No respiratory distress.   Musculoskeletal:         General: Deformity present. No signs of injury.      Right foot: No foot drop.      Comments: S/P partial foot amputation right.   Skin:     General: Skin is warm.      Capillary Refill: Capillary refill takes less than 2 seconds.      Coloration: Skin is not cyanotic or mottled.      Findings: Wound present. No abscess.      Nails: There is no clubbing.      Comments: Full thickness DFU in right plantar foot.  Periwound callus noted.  Wound bed is granular.  Skin maceration resolved.  No signs of infection.  No deep probing.  See wound assessment and photo.   Neurological:      General: No focal deficit present.      Mental Status: He is alert and oriented to person, place, and time.      Cranial Nerves: No cranial nerve deficit.      Sensory: Sensory deficit present.      Motor: No weakness.      Coordination: Coordination normal.   Psychiatric:         Mood and Affect: Mood normal.         Behavior: Behavior normal.         Thought Content: Thought content normal.         Judgment: Judgment normal.                            Lab Results   Component Value Date    HGBA1C 8.0 (A) 07/12/2024       Wound Instructions:  Orders Placed This Encounter   Procedures    Wound cleansing and dressings Diabetic Ulcer Right;Plantar Foot     Shower no. DO NOT get cast wet.       Right foot wound:   Double Layer 1/4 felt pad applied to heel.   Apply zinc to the periwound.  Apply acticoat 7 to the wound.     Cover with alginate and ABD.   Secure with kerlix and tape.  TCC applied by Dr. Ponce.   Change dressing weekly at the wound center.         Limit your walking for 24 hours until the cast sets.   Do not walk without the boot or the cast will crack.      Do not put any objects in the cast to scratch.       Follow up in one  week.     Standing Status:   Future     Expiration Date:   12/30/2024    Splint, Casting, Strapping     This order was created via procedure documentation    Wound Procedure Treatment Diabetic Ulcer Right;Plantar Foot     This order was created via procedure documentation             Satnam Ponce DPM

## 2024-12-30 ENCOUNTER — HOSPITAL ENCOUNTER (OUTPATIENT)
Dept: RADIOLOGY | Facility: HOSPITAL | Age: 74
Discharge: HOME | End: 2024-12-30
Payer: COMMERCIAL

## 2024-12-30 ENCOUNTER — OFFICE VISIT (OUTPATIENT)
Dept: WOUND CARE | Facility: CLINIC | Age: 74
End: 2024-12-30
Payer: COMMERCIAL

## 2024-12-30 VITALS
TEMPERATURE: 99.4 F | HEART RATE: 76 BPM | RESPIRATION RATE: 18 BRPM | DIASTOLIC BLOOD PRESSURE: 66 MMHG | SYSTOLIC BLOOD PRESSURE: 140 MMHG

## 2024-12-30 DIAGNOSIS — L97.412 ULCER OF HEEL AND MIDFOOT, RIGHT, WITH FAT LAYER EXPOSED (HCC): Primary | ICD-10-CM

## 2024-12-30 DIAGNOSIS — E11.621 DIABETIC ULCER OF TOE OF LEFT FOOT ASSOCIATED WITH TYPE 2 DIABETES MELLITUS, WITH FAT LAYER EXPOSED (HCC): ICD-10-CM

## 2024-12-30 DIAGNOSIS — E11.621 TYPE 2 DIABETES MELLITUS WITH FOOT ULCER (CODE) (HCC): ICD-10-CM

## 2024-12-30 DIAGNOSIS — L97.522 NON-PRESSURE CHRONIC ULCER OF OTHER PART OF LEFT FOOT WITH FAT LAYER EXPOSED (HCC): ICD-10-CM

## 2024-12-30 DIAGNOSIS — E11.40 TYPE 2 DIABETES MELLITUS WITH DIABETIC NEUROPATHY, UNSPECIFIED WHETHER LONG TERM INSULIN USE (HCC): ICD-10-CM

## 2024-12-30 DIAGNOSIS — L97.522 DIABETIC ULCER OF TOE OF LEFT FOOT ASSOCIATED WITH TYPE 2 DIABETES MELLITUS, WITH FAT LAYER EXPOSED (HCC): ICD-10-CM

## 2024-12-30 PROCEDURE — 29445 APPL RIGID TOT CNTC LEG CAST: CPT | Performed by: NURSE PRACTITIONER

## 2024-12-30 PROCEDURE — 73630 X-RAY EXAM OF FOOT: CPT

## 2024-12-30 PROCEDURE — 97597 DBRDMT OPN WND 1ST 20 CM/<: CPT | Performed by: NURSE PRACTITIONER

## 2024-12-30 RX ORDER — LIDOCAINE 40 MG/G
CREAM TOPICAL ONCE
Status: COMPLETED | OUTPATIENT
Start: 2024-12-30 | End: 2024-12-30

## 2024-12-30 RX ADMIN — LIDOCAINE: 40 CREAM TOPICAL at 16:03

## 2024-12-30 NOTE — PROGRESS NOTES
Wound Procedure Treatment Diabetic Ulcer Right;Plantar Foot    Performed by: Tanner Nielson RN  Authorized by: KIRIT Long    Associated wounds:   Wound 12/16/24 Diabetic Ulcer Foot Right;Plantar  Wound cleansed with:  Soap and water  Applied primary dressing:  Acticoat and Calcium alginate  Applied secondary dressing:  Superabsorber  Dressing secured with:  Kerlix and Tape  Offloading device appllied:  TCC  Comments:  Acticoat 7, convamax

## 2024-12-30 NOTE — PATIENT INSTRUCTIONS
Orders Placed This Encounter   Procedures    Wound cleansing and dressings     Shower no. DO NOT get cast wet.       Left third toe wound:  Apply alginate AG to the wound.  Cover with dry gauze.  Secure with rolled gauze and tape.   Change dressing three times a week.           Right foot wound:   Apply skin prep under felt pad.  Double Layer 1/4 felt pad applied to heel.   Apply acticoat 7 to the wound.     Cover with alginate and convamax..   Secure with kerlix and tape.  TCC applied by Briana.   Change dressing weekly at the wound center.         Limit your walking for 24 hours until the cast sets.   Do not walk without the boot or the cast will crack.      Do not put any objects in the cast to scratch.           Follow up at the wound center in one week.     Standing Status:   Future     Expiration Date:   1/6/2025

## 2024-12-30 NOTE — PROGRESS NOTES
Wound Procedure Treatment Diabetic Ulcer Anterior;Left Toe D3, third    Performed by: Tanner Nielson RN  Authorized by: KIRIT Long    Associated wounds:   Wound 12/30/24 Diabetic Ulcer Toe D3, third Anterior;Left  Wound cleansed with:  NSS  Applied primary dressing:  Calcium alginate and Silver  Applied secondary dressing:  Gauze  Dressing secured with:  Tape  Comments:  Alginate AG

## 2024-12-31 NOTE — ASSESSMENT & PLAN NOTE
Lab Results   Component Value Date    HGBA1C 8.0 (A) 07/12/2024       Orders:    Wound cleansing and dressings; Future

## 2024-12-31 NOTE — PROGRESS NOTES
"Debridement   Wound 12/30/24 Diabetic Ulcer Toe D3, third Anterior;Left    Universal Protocol:  procedure performed by consultantConsent: Written consent obtained.  Consent given by: patient  Time out: Immediately prior to procedure a \"time out\" was called to verify the correct patient, procedure, equipment, support staff and site/side marked as required.  Timeout called at: 12/31/2024 8:25 AM.  Patient identity confirmed: verbally with patient    Debridement Details  Performed by: NP  Debridement type: selective  Pain control: lidocaine 4%      Post-debridement measurements  Length (cm): 0.7  Width (cm): 0.9  Depth (cm): 0.1  Percent debrided: 100%  Surface Area (cm^2): 0.63  Area Debrided (cm^2): 0.63  Volume (cm^3): 0.06    Devitalized tissue debrided: biofilm, fibrin and slough  Instrument(s) utilized: curette  Bleeding: small  Hemostasis obtained with: pressure  Procedural pain (0-10): 0  Post-procedural pain: 0   Response to treatment: procedure was tolerated well        "

## 2024-12-31 NOTE — PROGRESS NOTES
Name: Won Gaitan Jr.      : 1950      MRN: [id number]  Encounter Provider: KIRIT Long  Encounter Date: 2024   Encounter department: [facility]  :  Assessment & Plan  Ulcer of heel and midfoot, right, with fat layer exposed (HCC)    Orders:    Wound cleansing and dressings; Future    lidocaine (LMX) 4 % cream    Wound Procedure Treatment Diabetic Ulcer Right;Plantar Foot    Type 2 diabetes mellitus with diabetic neuropathy, unspecified whether long term insulin use ([facility])    Lab Results   Component Value Date    HGBA1C 8.0 (A) 2024       Orders:    Wound cleansing and dressings; Future    Diabetic ulcer of toe of left foot associated with type 2 diabetes mellitus, with fat layer exposed ([facility])    Lab Results   Component Value Date    HGBA1C 8.0 (A) 2024       Orders:    lidocaine (LMX) 4 % cream    Wound Procedure Treatment Diabetic Ulcer Anterior;Left Toe D3, third    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  F/U visit.  Right plantar foot wound cleansed with normal saline and gauze.  Wound improving and measuring smaller with less depth present.  Wound has a clean granular wound base.  Continue current plan of care.  TCC was reapplied today to right lower extremity    2.  Patient has a new Parks grade 1 diabetic foot ulcer of his left third toe.  Wound debrided.  Wound not showing any signs or symptoms of infection.  Will have silver alginate applied to wound covered with gauze and secured with Kacy and tape change 3 times a week.    3.  Will order x-ray of left foot to rule out osteomyelitis    4.  A1C results reviewed with the patient today.  Patient maintaining tight glycemic  control    5.  Patient is to continue offloading pressure from his wounds to enhance wound healing    6.  Patient will follow-up in 1 week    History of Present Illness   Chief Complaint   Patient presents with    Follow Up Wound Care Visit     Right foot wound   F/u visit for Parks grade 1 diabetic foot ulcer of his right plantar foot.  Patient reports he has a new Parks grade 1 diabetic foot ulcer of his left third toe.  Patient thinks his wound developed as a result of his toe rubbing inside of his diabetic shoes.  He denies any fevers or chills.  Tolerating TCC        Objective   /66   Pulse 76   Temp 99.4 °F (37.4 °C)   Resp 18       Wound 12/16/24 Diabetic Ulcer Foot Right;Plantar (Active)   Enter Parks score: Parks Grade 1: Partial or full-thickness ulcer (superficial) 12/30/24 1454   Wound Image   12/30/24 1504   Wound Description Pink;Yellow 12/30/24 1454   Lavern-wound Assessment Intact;Callus 12/30/24 1454   Wound Length (cm) 0.9 cm 12/30/24 1454   Wound Width (cm) 0.9 cm 12/30/24 1454   Wound Depth (cm) 0.3 cm 12/30/24 1454   Wound Surface Area (cm^2) 0.81 cm^2 12/30/24 1454   Wound Volume (cm^3) 0.243 cm^3 12/30/24 1454   Calculated Wound Volume (cm^3) 0.24 cm^3 12/30/24 1454   Change in Wound Size % -9.09 12/30/24 1454   Number of underminings 1 12/16/24 1043   Undermining 1 0.4 12/23/24 1059   Undermining 1 is depth extending from 12-12, Deepest @ 9 o'clock 12/23/24 1059   Drainage Amount Small 12/30/24 1454   Drainage Description Tan;Purulent;Yellow 12/23/24 1059   Non-staged Wound Description Full thickness 12/30/24 1454   Wound packed? No 12/30/24 1454   Dressing Status Removed 12/23/24 1059       Wound 12/30/24 Diabetic Ulcer Toe D3, third Anterior;Left (Active)   Enter Parks score: Parks Grade 1: Partial or full-thickness ulcer (superficial) 12/30/24 1513   Wound Image   12/30/24 1514   Wound Description Yellow 12/30/24 1513   Lavern-wound Assessment Maceration;Swelling 12/30/24 9218  "  Wound Length (cm) 0.7 cm 12/30/24 1513   Wound Width (cm) 0.9 cm 12/30/24 1513   Wound Depth (cm) 0.1 cm 12/30/24 1513   Wound Surface Area (cm^2) 0.63 cm^2 12/30/24 1513   Wound Volume (cm^3) 0.063 cm^3 12/30/24 1513   Calculated Wound Volume (cm^3) 0.06 cm^3 12/30/24 1513   Drainage Amount Moderate 12/30/24 1513   Drainage Description Yellow 12/30/24 1513   Wound packed? No 12/30/24 1513       Cast Application    Date/Time: 12/30/2024 2:30 PM    Performed by: KIRIT Long  Authorized by: KIRIT Long  [place] Protocol:  procedure performed by consultantConsent: Verbal consent obtained.  Consent given by: patient  Time out: Immediately prior to procedure a \"time out\" was called to verify the correct patient, procedure, equipment, support staff and site/side marked as required.  Timeout called at: 12/31/2024 8:23 AM.  Patient understanding: patient states understanding of the procedure being performed  Patient consent: the patient's understanding of the procedure matches consent given  Procedure consent matches procedure scheduled: N/A.  Relevant documents present and verified: N/A.  Test results available and properly labeled: N/A.  Site marked: the operative site was marked  Imaging studies available: N/A.  Required blood products, implants, devices, and special equipment available: N/A.  Patient identity confirmed: verbally with patient    Pre-procedure details:     Sensation:  Unchanged  Procedure details:     Laterality:  Right    Location:  Foot    Foot:  R footCast type:  Total contact      Post-procedure details:     Pain:  Unchanged    Sensation:  Unchanged    Patient tolerance of procedure:  Tolerated well, no immediate complications             "

## 2025-01-06 ENCOUNTER — OFFICE VISIT (OUTPATIENT)
Dept: WOUND CARE | Facility: CLINIC | Age: 75
End: 2025-01-06
Payer: COMMERCIAL

## 2025-01-06 VITALS
DIASTOLIC BLOOD PRESSURE: 82 MMHG | TEMPERATURE: 97.6 F | HEART RATE: 80 BPM | RESPIRATION RATE: 16 BRPM | SYSTOLIC BLOOD PRESSURE: 136 MMHG

## 2025-01-06 DIAGNOSIS — L97.412 ULCER OF HEEL AND MIDFOOT, RIGHT, WITH FAT LAYER EXPOSED (HCC): Primary | ICD-10-CM

## 2025-01-06 DIAGNOSIS — L97.521 ULCER OF TOE OF LEFT FOOT, LIMITED TO BREAKDOWN OF SKIN (HCC): ICD-10-CM

## 2025-01-06 DIAGNOSIS — E11.40 TYPE 2 DIABETES MELLITUS WITH DIABETIC NEUROPATHY, UNSPECIFIED WHETHER LONG TERM INSULIN USE (HCC): ICD-10-CM

## 2025-01-06 PROCEDURE — 29445 APPL RIGID TOT CNTC LEG CAST: CPT | Performed by: PODIATRIST

## 2025-01-06 PROCEDURE — 99213 OFFICE O/P EST LOW 20 MIN: CPT | Performed by: PODIATRIST

## 2025-01-06 NOTE — PROGRESS NOTES
Wound Procedure Treatment Diabetic Ulcer Anterior;Left Toe D3, third    Performed by: Stephanie White RN  Authorized by: Satnam Ponce DPM    Associated wounds:   Wound 12/30/24 Diabetic Ulcer Toe D3, third Anterior;Left  Wound cleansed with:  Wound aggrssively cleansed with NSS and gauze  Applied primary dressing:  Silver and Calcium alginate  Applied secondary dressing:  Gauze  Dressing secured with:  Tape

## 2025-01-06 NOTE — PATIENT INSTRUCTIONS
Orders Placed This Encounter   Procedures    Wound cleansing and dressings     Shower no. DO NOT get cast wet.       Left third toe wound:  Apply alginate AG to the wound.  Cover with dry gauze.  Secure with rolled gauze and tape.   Change dressing three times a week.         Right foot wound:   Apply skin prep under felt pad.  Double Layer 1/4 felt pad applied to heel.   Apply acticoat 7 to the wound.     Cover with alginate and convamax..   Secure with kerlix and tape.  TCC applied by Dr. Ponce.   Change dressing weekly at the wound center.       Limit your walking for 24 hours until the cast sets.   Do not walk without the boot or the cast will crack.      Do not put any objects in the cast to scratch.          Follow up at the wound center in one week.     Standing Status:   Future     Expiration Date:   1/13/2025    Wound Procedure Treatment Diabetic Ulcer Right;Plantar Foot     This order was created via procedure documentation    Wound Procedure Treatment Diabetic Ulcer Anterior;Left Toe D3, third     This order was created via procedure documentation

## 2025-01-06 NOTE — PROGRESS NOTES
Wound Procedure Treatment Diabetic Ulcer Right;Plantar Foot    Performed by: Stephanie White RN  Authorized by: Satnam Ponce DPM    Associated wounds:   Wound 12/16/24 Diabetic Ulcer Foot Right;Plantar  Wound cleansed with:  Wound aggrssively cleansed with NSS and gauze  Applied primary dressing:  Acticoat and Calcium alginate  Applied secondary dressing:  Superabsorber  Dressing secured with:  Kacy and Tape  Offloading device appllied:  TCC  Comments:  TCC applied by Dr. Ponce

## 2025-01-06 NOTE — PROGRESS NOTES
Patient ID: Won Gaitan Jr. is a 74 y.o. male Date of Birth 1950       Chief Complaint   Patient presents with    Follow Up Wound Care Visit     Follow up visit for wound to right foot.  Pt denies any issues or concern since last visit.        Allergies:  Simvastatin and Itraconazole    Assessments:      Diagnosis ICD-10-CM Associated Orders   1. Ulcer of heel and midfoot, right, with fat layer exposed ([facility])  L97.412 Wound cleansing and dressings     Wound Procedure Treatment Diabetic Ulcer Right;Plantar Foot     Wound Procedure Treatment Diabetic Ulcer Anterior;Left Toe D3, third     Splint, Casting, Strapping      2. Type 2 diabetes mellitus with diabetic neuropathy, unspecified whether long term insulin use ([facility])  E11.40 Wound cleansing and dressings     Wound Procedure Treatment Diabetic Ulcer Right;Plantar Foot     Wound Procedure Treatment Diabetic Ulcer Anterior;Left Toe D3, third     Splint, Casting, Strapping      3. Ulcer of toe of left foot, limited to breakdown of skin ([facility])  L97.521                Plan:   Reviewed medical records.  Patient was counseled and educated on the condition and the diagnosis.    2. The diagnosis, treatment options and prognosis were discussed with the patient.    3.  Continue off-loading PAD and TCC.  TCC was applied per protocol. Silver alginate for left 2nd toe wound.  Wear surgical shoe on left foot.  4. Stressed on patient compliance about proper off-loading, and staying off of foot to reduce the risk of infection, non-healing, and limb loss.    5. Patient will return in 1 week for re-evaluation.         Imaging: I have personally reviewed pertinent films in PACS  Labs, pathology, and Other Studies: I have personally reviewed pertinent reports.        Splint, Casting, Strapping    Date/Time: 1/6/2025 10:00 AM    Performed by: Satnam Ponce DPM  Authorized by: Satnam Ponce DPM  Universal Protocol:  Consent: Verbal consent obtained.  Risks and benefits: risks, benefits  "and alternatives were discussed  Consent given by: patient  Time out: Immediately prior to procedure a \"time out\" was called to verify the correct patient, procedure, equipment, support staff and site/side marked as required.  Timeout called at: 1/6/2025 10:46 AM.  Patient understanding: patient states understanding of the procedure being performed  Patient identity confirmed: verbally with patient    Procedure details:     Laterality:  Right    Location:  Foot    Foot:  R footCast type:  Total contact        Supplies:  Cotton padding, sling, fiberglass and skin protective strip  Post-procedure details:     Patient tolerance of procedure:  Tolerated well, no immediate complications         Subjective:   HPI  The patient presents for evaluation and treatment of wound in right plantar foot.  Tolerated TCC well.  No significant pain.  Wound on left 2nd toe looks better.        The following portions of the patient's history were reviewed and updated as appropriate:   Patient Active Problem List   Diagnosis    Hyperlipidemia    Type 2 diabetes mellitus with diabetic neuropathy ([facility])    Atrial fibrillation with RVR ([facility])    Right middle lobe pulmonary nodule    Type 2 diabetes mellitus with other skin ulcer (CODE) ([facility])    Essential hypertension    Benign prostatic hyperplasia with lower urinary tract symptoms    Microalbuminuria    Ambulatory dysfunction    S/P transmetatarsal amputation of foot, right ([facility])    Sleep disturbance    Osteomyelitis of foot, acute ([facility])    Anxiety    S/P R ALT flap to right foot    Gait abnormality    Vasomotor rhinitis    Corns and callosities    Tinea unguium    Puncture wound of left foot with foreign body, subsequent encounter    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 ([facility])    Ventricular fibrillation (HCC)    Human immunodeficiency virus (HIV) disease ([facility])    Personal history of COVID-19    Bronchitis    S/P amputation " "of lesser toe, left (HCC)    Ulcer of toe of left foot, limited to breakdown of skin ([facility])    Medication management    Subconjunctival hemorrhage of right eye     Past Medical History:   Diagnosis Date    Arthritis     Atrial fibrillation (HCC)     Diagnosed 11/2018    Benign prostate hyperplasia 04/2002    Cellulitis     right lower leg     Colon polyp 2006    Diabetes mellitus (HCC)     Hearing aid worn     bilat    Hearing loss     90% loss left ear and 40% right ear    History of clubfoot     \"since birth\"    History of pneumonia     History of TIA (transient ischemic attack) 04/25/2017 11/30/18 pt denies    Hyperlipidemia 5/15/2014    Hypertension     Infectious viral hepatitis     \"cant remember type\"    Irregular heart beat     Afib    Neuropathy     both feet    Osteomyelitis (HCC)     right great toe    Pneumonia     Right middle lobe pulmonary nodule 11/6/2018    Seasickness     Teeth missing     Type 2 diabetes mellitus with diabetic neuropathy (HCC) 11/6/2018    Wears glasses     reading    Wound, open     bottom of right foot     Past Surgical History:   Procedure Laterality Date    BUNIONECTOMY Right 12/4/2018    Procedure: 5TH METATARSAL BONE PARTIAL RESECTION, FULL THICKNESS DEBRIDEMENT OF DIABETIC ULCER;  Surgeon: Shala Fylnn DPM;  [facility];  Service: Podiatry    CLOSURE DELAYED PRIMARY Left 10/30/2023    Procedure: CLOSURE DELAYED PRIMARY left foot;  Surgeon: Minh Urena DPM;  [facility];  Service: Podiatry    CLUB FOOT RELEASE Bilateral     COLONOSCOPY      COMPLEX WOUND CLOSURE TO EXTREMITY  4/27/2021    Procedure: COMPLEX WOUND CLOSURE TO EXTREMITY: RIGHT FOOT;  Surgeon: Niraj Bojorquez MD;  [facility];  Service: Plastics    EXTERNAL FIXATOR APPLICATION Right 2/12/2021    Procedure: Application multiplane external fixation;  Surgeon: Harry Zamudio DPM;  [facility];  Service: Podiatry    FOOT HARDWARE REMOVAL Right 2/17/2021    " Procedure: REMOVAL EXTERNAL FIXATOR;  Surgeon: Harry Zamudio DPM;  [facility];  Service: Podiatry    FOREIGN BODY REMOVAL Right 4/27/2021    Procedure: REMOVAL FOREIGN BODY EXTREMITY: RIGHT FOOT;  Surgeon: Niraj Bojorquez MD;  [facility];  Service: Plastics    INCISION AND DRAINAGE OF WOUND Right 2/17/2021    Procedure: INCISION AND DRAINAGE (I&D) EXTREMITY;  Surgeon: Harry Zamudio DPM;  [facility];  Service: Podiatry    INCISION AND DRAINAGE OF WOUND Left 10/26/2023    Procedure: INCISION AND DRAINAGE (I&D) EXTREMITY; WASHOUT; REMOVAL OF FOREIGN BODY;  Surgeon: Satnam Ponce DPM;  [facility];  Service: Podiatry    ORIF FOOT FRACTURE Right 3/4/2021    Procedure: VAC PLACEMENT; SCREW FIXATION RIGHT FOOT FUSION;  Surgeon: Harry Zamudio DPM;  [facility];  Service: Podiatry    AL AMPUTATION FOOT TRANSMETARSAL Right 2/22/2021    Procedure: AMPUTATION TRANSMETATARSAL (TMA), REMOVAL NAIL IM T2 ICF HARDWARE;  Surgeon: Harry Zamudio DPM;  [facility];  Service: Podiatry    AL AMPUTATION METATARSAL W/TOE SINGLE Left 11/29/2023    Procedure: LEFT FOURTH RAY RESECTION FOOT;  Surgeon: Harry Zamudio DPM;  [facility];  Service: Podiatry    AL ARTHRD MIDTARSL/TARS MLT/TRANSVRS W/OSTEOT Right 2/12/2021    Procedure: foot ARTHRODESIS/FUSION;  Surgeon: Harry Zamudio DPM;  [facility];  Service: Podiatry    AL DEBRIDEMENT BONE 1ST 20 SQ CM/< Right 12/2/2021    Procedure: DEBRIDEMENT OF TARSAL BONES WITH ANITBIODIC BEADS;  Surgeon: Harry Zamudio DPM;  [facility];  Service: Podiatry    AL LNGTH/SHRT TENDON LEG/ANKLE 1 TENDON SPX Right 2/12/2021    Procedure: LENGTHEN TIBIAL TENDON, TRANS HEEL CORD;  Surgeon: Harry Zamudio DPM;  [facility];  Service: Podiatry    AL MUSC MYOCUTANEOUS/FASCIOCUTANEOUS FLAP TRUNK Right 4/12/2021    Procedure: RECONSTRUCTION MICROSURGICAL W/ FREE FLAP;  Surgeon: Niraj Mccormick  MD Maryellen;  [facility];  Service: Plastics    NM MUSC MYOCUTANEOUS/FASCIOCUTANEOUS FLAP TRUNK Right 4/12/2021    Procedure: TAKEBACK RECONSTRUCTION MICROSURGICAL W/ FREE FLAP;  Surgeon: Niraj Bojorquez MD;  [facility];  Service: Plastics    NM MUSC MYOCUTANEOUS/FASCIOCUTANEOUS FLAP TRUNK Right 4/13/2021    Procedure: RECONSTRUCTION MICROSURGICAL W/ FREE FLAP, RE EXPLORATION, MICRO VASCULAR REVISION;  Surgeon: Niraj Bojorquez MD;  [facility];  Service: Plastics    NM OSTC PRTL EXOSTC/CONDYLC METAR HEAD Right 12/29/2020    Procedure: EXCISION EXOSTOSIS;  Surgeon: Harry Zamudio DPM;  [facility];  Service: Podiatry    NM SECONDARY CLOSURE SURG WOUND/DEHSN XTNSV/COMP Right 12/29/2020    Procedure: PRIMARY DELAYED CLOSURE;  Surgeon: Harry Zamudio DPM;  [facility];  Service: Podiatry    TOE AMPUTATION Right     partial great toe    TONSILLECTOMY      VAC DRESSING APPLICATION Right 3/1/2021    Procedure: APPLICATION VAC DRESSING EXTREMITY;  Surgeon: Niraj Bojorquez MD;  [facility];  Service: Plastics    VASECTOMY  1992    WOUND DEBRIDEMENT Right 3/1/2021    Procedure: DEBRIDEMENT LOWER EXTREMITY (WASH OUT);  Surgeon: Niraj Bojorquez MD;  [facility];  Service: Plastics    WOUND DEBRIDEMENT Right 4/7/2021    Procedure: DEBRIDEMENT RIGHT FOOT;  Surgeon: Niraj Bojorquez MD;  [facility];  Service: Plastics    WOUND DEBRIDEMENT Right 4/12/2021    Procedure: DEBRIDEMENT LOWER EXTREMITY (WASH OUT);  Surgeon: Niraj Bojorquez MD;  [facility];  Service: Plastics    WOUND DEBRIDEMENT Right 4/27/2021    Procedure: DEBRIDEMENT LOWER EXTREMITY (WASH OUT): RIGHT FOOT;  Surgeon: Niraj Bojorquez MD;  [facility];  Service: Plastics     Family History   Problem Relation Age of Onset    Diabetes Father         mellitus      Social History     Socioeconomic History    Marital  status: /Civil Union     Spouse name: None    Number of children: None    Years of education: None    Highest education level: None   Occupational History    None   Tobacco Use    Smoking status: Former     Current packs/day: 0.00     Types: Cigarettes     Quit date: 1988     Years since quittin.1     Passive exposure: Past    Smokeless tobacco: Former    Tobacco comments:     exposure to passive smoke   Vaping Use    Vaping status: Never Used   Substance and Sexual Activity    Alcohol use: Yes     Comment: occasionally    Drug use: Not Currently    Sexual activity: Yes     Partners: Female   Other Topics Concern    None   Social History Narrative    None     Social Drivers of Health     Financial Resource Strain: Not on file   Food Insecurity: No Food Insecurity (2024)    Nursing - Inadequate Food Risk Classification     Worried About Running Out of Food in the Last Year: Never true     Ran Out of Food in the Last Year: Never true     Ran Out of Food in the Last Year: Not on file   Transportation Needs: No Transportation Needs (2024)    PRAPARE - Transportation     Lack of Transportation (Medical): No     Lack of Transportation (Non-Medical): No   Physical Activity: Not on file   Stress: Not on file   Social Connections: Not on file   Intimate Partner Violence: Not on file   Housing Stability: Low Risk  (2024)    Housing Stability Vital Sign     Unable to Pay for Housing in the Last Year: No     Number of Times Moved in the Last Year: 0     Homeless in the Last Year: No        Current Outpatient Medications:     acetaminophen (Mapap Arthritis Pain) 650 mg CR tablet, Take 1 tablet (650 mg total) by mouth 2 (two) times a day, Disp: 60 tablet, Rfl: 1    atorvastatin (LIPITOR) 40 mg tablet, TAKE 1 TABLET (40 MG TOTAL) BY MOUTH DAILY, Disp: 90 tablet, Rfl: 2    Continuous Glucose Sensor (FreeStyle Taylor 3 Sensor) MISC, Use 1 each every 14 (fourteen) days, Disp: 1 each, Rfl: 0    diltiazem  (CARDIZEM) 120 MG tablet, TAKE 1 TABLET (120 MG TOTAL) BY MOUTH TWICE A DAY, Disp: 180 tablet, Rfl: 1    Eliquis 5 MG, Take 1 tablet (5 mg total) by mouth 2 (two) times a day, Disp: 60 tablet, Rfl: 11    glucose blood (Accu-Chek Guide) test strip, USE DAILY, Disp: 100 strip, Rfl: 2    Insulin Glargine Solostar (Lantus SoloStar) 100 UNIT/ML SOPN, Inject 0.35 mL (35 Units total) under the skin daily at bedtime, Disp: 45 mL, Rfl: 1    insulin lispro (HumaLOG) 100 units/mL injection pen, Inject up to 20 Units under the skin 3 (three) times a day with meals, Disp: 15 mL, Rfl: 5    Insulin Pen Needle (Easy Comfort Pen Needles) 33G X 5 MM MISC, USE AS DIRECTED, Disp: 100 each, Rfl: 4    Lancets (onetouch ultrasoft) lancets, Use as instructed, Disp: 100 each, Rfl: 3    Lancets (onetouch ultrasoft) lancets, Use daily, Disp: 100 each, Rfl: 3    lisinopril (ZESTRIL) 40 mg tablet, Take 1 tablet (40 mg total) by mouth daily, Disp: 90 tablet, Rfl: 3    metFORMIN (GLUCOPHAGE-XR) 750 mg 24 hr tablet, Take 1 tablet (750 mg total) by mouth 2 (two) times a day with meals, Disp: 180 tablet, Rfl: 3    metoprolol succinate (TOPROL-XL) 100 mg 24 hr tablet, TAKE 1 TABLET (100 MG TOTAL) BY MOUTH 2 (TWO) TIMES A DAY, Disp: 180 tablet, Rfl: 0    mupirocin (BACTROBAN) 2 % ointment, Apply topically 3 (three) times a day, Disp: 15 g, Rfl: 0    tamsulosin (FLOMAX) 0.4 mg, Take 1 capsule (0.4 mg total) by mouth daily with dinner, Disp: 90 capsule, Rfl: 2    Review of Systems   Constitutional:  Negative for chills and fever.   Respiratory:  Negative for cough and shortness of breath.    Cardiovascular:  Negative for chest pain.   Gastrointestinal:  Negative for nausea and vomiting.   Skin:  Positive for wound.   Neurological:  Positive for numbness. Negative for weakness.       Objective:  /82   Pulse 80   Temp 97.6 °F (36.4 °C) (Tympanic)   Resp 16   Pain Score: 0-No pain     Physical Exam  Vitals and nursing note reviewed.    Constitutional:       General: He is not in acute distress.     Appearance: He is not toxic-appearing or diaphoretic.   Cardiovascular:      Rate and Rhythm: Normal rate and regular rhythm.      Pulses:           Dorsalis pedis pulses are 1+ on the right side.        Posterior tibial pulses are 0 on the right side.   Pulmonary:      Effort: Pulmonary effort is normal. No respiratory distress.   Musculoskeletal:         General: Deformity present. No signs of injury.      Right foot: No foot drop.      Comments: S/P partial foot amputation right.   Skin:     General: Skin is warm.      Capillary Refill: Capillary refill takes less than 2 seconds.      Coloration: Skin is not cyanotic or mottled.      Findings: Wound present. No abscess.      Nails: There is no clubbing.      Comments: Full thickness DFU in right plantar foot.  Decrease in size and depth.  Wound bed is granular without signs of infection.  No deep probing.  Partial thick wound left 2nd toe.  Wound bed is granular.  No signs of infection.  See wound assessment and photo.   Neurological:      General: No focal deficit present.      Mental Status: He is alert and oriented to person, place, and time.      Cranial Nerves: No cranial nerve deficit.      Sensory: Sensory deficit present.      Motor: No weakness.      Coordination: Coordination normal.   Psychiatric:         Mood and Affect: Mood normal.         Behavior: Behavior normal.         Thought Content: Thought content normal.         Judgment: Judgment normal.           Wound 12/16/24 Diabetic Ulcer Foot Right;Plantar (Active)   Wound Image Images linked 01/06/25 1026   Wound Description Pink;Yellow 01/06/25 1026   Lavern-wound Assessment Dry;Scaly;Callus;Induration 01/06/25 1026   Wound Length (cm) 0.7 cm 01/06/25 1026   Wound Width (cm) 0.9 cm 01/06/25 1026   Wound Depth (cm) 0.3 cm 01/06/25 1026   Wound Surface Area (cm^2) 0.63 cm^2 01/06/25 1026   Wound Volume (cm^3) 0.189 cm^3 01/06/25 1026    Calculated Wound Volume (cm^3) 0.19 cm^3 01/06/25 1026   Change in Wound Size % 13.64 01/06/25 1026   Undermining 1 0.3 (@ 1 o'clock.) 01/06/25 1026   Undermining 1 is depth extending from 11 - 6 o'clock, deepest @ 1 o'clock 01/06/25 1026   Drainage Amount Small 01/06/25 1026   Drainage Description Serosanguineous;Yellow 01/06/25 1026   Non-staged Wound Description Full thickness 01/06/25 1026   Dressing Status Removed 01/06/25 1026       Wound 12/30/24 Diabetic Ulcer Toe D3, third Anterior;Left (Active)   Wound Image Images linked 01/06/25 1026   Wound Description Pink;Yellow 01/06/25 1026   Lavern-wound Assessment Dry;Scaly;Edema 01/06/25 1026   Wound Length (cm) 0.5 cm 01/06/25 1026   Wound Width (cm) 1.1 cm 01/06/25 1026   Wound Depth (cm) 0.1 cm 01/06/25 1026   Wound Surface Area (cm^2) 0.55 cm^2 01/06/25 1026   Wound Volume (cm^3) 0.055 cm^3 01/06/25 1026   Calculated Wound Volume (cm^3) 0.06 cm^3 01/06/25 1026   Change in Wound Size % 0 01/06/25 1026   Drainage Amount Small 01/06/25 1026   Drainage Description Serosanguineous;Yellow 01/06/25 1026   Non-staged Wound Description Full thickness 01/06/25 1026   Dressing Status Removed 01/06/25 1026                   Lab Results   Component Value Date    HGBA1C 8.0 (A) 07/12/2024       Wound Instructions:  Orders Placed This Encounter   Procedures    Wound cleansing and dressings     Shower no. DO NOT get cast wet.       Left third toe wound:  Apply alginate AG to the wound.  Cover with dry gauze.  Secure with rolled gauze and tape.   Change dressing three times a week.         Right foot wound:   Apply skin prep under felt pad.  Double Layer 1/4 felt pad applied to heel.   Apply acticoat 7 to the wound.     Cover with alginate and convamax..   Secure with kerlix and tape.  TCC applied by Dr. Ponce.   Change dressing weekly at the wound center.       Limit your walking for 24 hours until the cast sets.   Do not walk without the boot or the cast will crack.      Do  not put any objects in the cast to scratch.          Follow up at the wound center in one week.     Standing Status:   Future     Expiration Date:   1/13/2025    Wound Procedure Treatment Diabetic Ulcer Right;Plantar Foot     This order was created via procedure documentation    Wound Procedure Treatment Diabetic Ulcer Anterior;Left Toe D3, third     This order was created via procedure documentation    Splint, Casting, Strapping     This order was created via procedure documentation             Satnam Ponce DPM

## 2025-01-13 ENCOUNTER — TELEPHONE (OUTPATIENT)
Age: 75
End: 2025-01-13

## 2025-01-13 ENCOUNTER — OFFICE VISIT (OUTPATIENT)
Dept: FAMILY MEDICINE CLINIC | Facility: CLINIC | Age: 75
End: 2025-01-13
Payer: COMMERCIAL

## 2025-01-13 ENCOUNTER — OFFICE VISIT (OUTPATIENT)
Dept: WOUND CARE | Facility: CLINIC | Age: 75
End: 2025-01-13
Payer: COMMERCIAL

## 2025-01-13 VITALS
SYSTOLIC BLOOD PRESSURE: 110 MMHG | OXYGEN SATURATION: 99 % | HEART RATE: 77 BPM | HEIGHT: 76 IN | DIASTOLIC BLOOD PRESSURE: 60 MMHG | TEMPERATURE: 97.2 F | BODY MASS INDEX: 29.21 KG/M2

## 2025-01-13 VITALS
TEMPERATURE: 97.2 F | SYSTOLIC BLOOD PRESSURE: 144 MMHG | HEART RATE: 54 BPM | DIASTOLIC BLOOD PRESSURE: 76 MMHG | RESPIRATION RATE: 16 BRPM

## 2025-01-13 DIAGNOSIS — R68.82 DECREASED LIBIDO: ICD-10-CM

## 2025-01-13 DIAGNOSIS — L97.526 NON-PRESSURE CHRONIC ULCER OF OTHER PART OF LEFT FOOT WITH BONE INVOLVEMENT WITHOUT EVIDENCE OF NECROSIS (HCC): ICD-10-CM

## 2025-01-13 DIAGNOSIS — M86.179 OSTEOMYELITIS OF FOOT, ACUTE (HCC): ICD-10-CM

## 2025-01-13 DIAGNOSIS — L97.412 ULCER OF HEEL AND MIDFOOT, RIGHT, WITH FAT LAYER EXPOSED (HCC): Primary | ICD-10-CM

## 2025-01-13 DIAGNOSIS — E11.40 TYPE 2 DIABETES MELLITUS WITH DIABETIC NEUROPATHY, UNSPECIFIED WHETHER LONG TERM INSULIN USE (HCC): ICD-10-CM

## 2025-01-13 DIAGNOSIS — E11.40 TYPE 2 DIABETES MELLITUS WITH DIABETIC NEUROPATHY, UNSPECIFIED WHETHER LONG TERM INSULIN USE (HCC): Primary | ICD-10-CM

## 2025-01-13 DIAGNOSIS — I49.01 VENTRICULAR FIBRILLATION (HCC): ICD-10-CM

## 2025-01-13 DIAGNOSIS — F32.A ANXIETY AND DEPRESSION: ICD-10-CM

## 2025-01-13 DIAGNOSIS — L97.521 ULCER OF TOE OF LEFT FOOT, LIMITED TO BREAKDOWN OF SKIN (HCC): ICD-10-CM

## 2025-01-13 DIAGNOSIS — I48.91 ATRIAL FIBRILLATION WITH RVR (HCC): ICD-10-CM

## 2025-01-13 DIAGNOSIS — B20 HUMAN IMMUNODEFICIENCY VIRUS (HIV) DISEASE (HCC): ICD-10-CM

## 2025-01-13 DIAGNOSIS — Z89.431 S/P TRANSMETATARSAL AMPUTATION OF FOOT, RIGHT (HCC): ICD-10-CM

## 2025-01-13 DIAGNOSIS — F41.9 ANXIETY AND DEPRESSION: ICD-10-CM

## 2025-01-13 PROCEDURE — 29445 APPL RIGID TOT CNTC LEG CAST: CPT | Performed by: PODIATRIST

## 2025-01-13 PROCEDURE — 99214 OFFICE O/P EST MOD 30 MIN: CPT | Performed by: FAMILY MEDICINE

## 2025-01-13 PROCEDURE — 99214 OFFICE O/P EST MOD 30 MIN: CPT | Performed by: PODIATRIST

## 2025-01-13 RX ORDER — ESCITALOPRAM OXALATE 5 MG/1
5 TABLET ORAL DAILY
Qty: 90 TABLET | Refills: 0 | Status: SHIPPED | OUTPATIENT
Start: 2025-01-13

## 2025-01-13 RX ORDER — CEPHALEXIN 500 MG/1
500 CAPSULE ORAL EVERY 6 HOURS SCHEDULED
Qty: 28 CAPSULE | Refills: 0 | Status: SHIPPED | OUTPATIENT
Start: 2025-01-13 | End: 2025-01-20

## 2025-01-13 NOTE — PROGRESS NOTES
Patient ID: Won Gaitan Jr. is a 74 y.o. male Date of Birth 1950       Chief Complaint   Patient presents with    Follow Up Wound Care Visit     Right foot/TCC, left toe wound       Allergies:  Simvastatin and Itraconazole    Assessments:      Diagnosis ICD-10-CM Associated Orders   1. Ulcer of heel and midfoot, right, with fat layer exposed ([facility])  L97.412 Splint, Casting, Strapping     Wound cleansing and dressings Diabetic Ulcer Right;Plantar Foot     cephalexin (KEFLEX) 500 mg capsule     Wound Procedure Treatment Diabetic Ulcer Right;Plantar Foot      2. Ulcer of toe of left foot, limited to breakdown of skin ([facility])  L97.521 Wound cleansing and dressings Diabetic Ulcer Anterior;Left Toe D3, third     cephalexin (KEFLEX) 500 mg capsule     Wound Procedure Treatment Diabetic Ulcer Anterior;Left Toe D3, third      3. Type 2 diabetes mellitus with diabetic neuropathy, unspecified whether long term insulin use ([facility])  E11.40 Splint, Casting, Strapping               Plan:   Reviewed medical records.  Patient was counseled and educated on the condition and the diagnosis.    2. The diagnosis, treatment options and prognosis were discussed with the patient.    3.  Wound is calderon well.  Continue off-loading PAD and TCC.  TCC was applied per protocol.  Xeroform to left 2nd toe wound.  Wear surgical shoe on left foot.  Rx Keflex to control bioburden on left 2nd toe wound.  4. Stressed on patient compliance about proper off-loading, and staying off of foot to reduce the risk of infection, non-healing, and limb loss.    5. Patient will return in 1 week for re-evaluation.         Imaging: I have personally reviewed pertinent films in PACS  Labs, pathology, and Other Studies: I have personally reviewed pertinent reports.        Splint, Casting, Strapping    Date/Time: 1/13/2025 9:00 AM    Performed by: Satnam Ponce DPM  Authorized by: Satnam Ponce DPM  Universal Protocol:  Consent: Verbal consent obtained.  Risks and  "benefits: risks, benefits and alternatives were discussed  Consent given by: patient  Time out: Immediately prior to procedure a \"time out\" was called to verify the correct patient, procedure, equipment, support staff and site/side marked as required.  Timeout called at: 1/13/2025 10:07 AM.  Patient understanding: patient states understanding of the procedure being performed  Patient identity confirmed: verbally with patient    Procedure details:     Laterality:  Right    Location:  Foot    Foot:  R footCast type:  Total contact        Supplies:  Cotton padding, sling, fiberglass and skin protective strip  Post-procedure details:     Patient tolerance of procedure:  Tolerated well, no immediate complications       Subjective:   HPI  The patient presents for evaluation and treatment of wound in right plantar foot.  Tolerated TCC well.  No significant pain.      The following portions of the patient's history were reviewed and updated as appropriate:   Patient Active Problem List   Diagnosis    Hyperlipidemia    Type 2 diabetes mellitus with diabetic neuropathy ([facility])    Atrial fibrillation with RVR ([facility])    Right middle lobe pulmonary nodule    Type 2 diabetes mellitus with other skin ulcer (CODE) ([facility])    Essential hypertension    Benign prostatic hyperplasia with lower urinary tract symptoms    Microalbuminuria    Ambulatory dysfunction    S/P transmetatarsal amputation of foot, right ([facility])    Sleep disturbance    Osteomyelitis of foot, acute ([facility])    Anxiety    S/P R ALT flap to right foot    Gait abnormality    Vasomotor rhinitis    Corns and callosities    Tinea unguium    Puncture wound of left foot with foreign body, subsequent encounter    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 ([facility])    Ventricular fibrillation ([facility])    Human immunodeficiency virus (HIV) disease ([facility])    Personal history of COVID-19    Bronchitis    S/P amputation of lesser toe, " "left (HCC)    Ulcer of toe of left foot, limited to breakdown of skin (HCC)    Medication management    Subconjunctival hemorrhage of right eye     Past Medical History:   Diagnosis Date    Arthritis     Atrial fibrillation (HCC)     Diagnosed 11/2018    Benign prostate hyperplasia 04/2002    Cellulitis     right lower leg     Colon polyp 2006    Diabetes mellitus (HCC)     Hearing aid worn     bilat    Hearing loss     90% loss left ear and 40% right ear    History of clubfoot     \"since birth\"    History of pneumonia     History of TIA (transient ischemic attack) 04/25/2017 11/30/18 pt denies    Hyperlipidemia 5/15/2014    Hypertension     Infectious viral hepatitis     \"cant remember type\"    Irregular heart beat     Afib    Neuropathy     both feet    Osteomyelitis (HCC)     right great toe    Pneumonia     Right middle lobe pulmonary nodule 11/6/2018    Seasickness     Teeth missing     Type 2 diabetes mellitus with diabetic neuropathy (HCC) 11/6/2018    Wears glasses     reading    Wound, open     bottom of right foot     Past Surgical History:   Procedure Laterality Date    BUNIONECTOMY Right 12/4/2018    Procedure: 5TH METATARSAL BONE PARTIAL RESECTION, FULL THICKNESS DEBRIDEMENT OF DIABETIC ULCER;  Surgeon: Shala Flynn DPM;  [facility];  Service: Podiatry    CLOSURE DELAYED PRIMARY Left 10/30/2023    Procedure: CLOSURE DELAYED PRIMARY left foot;  Surgeon: Minh Urena DPM;  [facility];  Service: Podiatry    CLUB FOOT RELEASE Bilateral     COLONOSCOPY      COMPLEX WOUND CLOSURE TO EXTREMITY  4/27/2021    Procedure: COMPLEX WOUND CLOSURE TO EXTREMITY: RIGHT FOOT;  Surgeon: Niraj Bojorquez MD;  [facility];  Service: Plastics    EXTERNAL FIXATOR APPLICATION Right 2/12/2021    Procedure: Application multiplane external fixation;  Surgeon: Harry Zamudio DPM;  [facility];  Service: Podiatry    FOOT HARDWARE REMOVAL Right 2/17/2021    Procedure: REMOVAL " EXTERNAL FIXATOR;  Surgeon: Harry Zamudio DPM;  [facility];  Service: Podiatry    FOREIGN BODY REMOVAL Right 4/27/2021    Procedure: REMOVAL FOREIGN BODY EXTREMITY: RIGHT FOOT;  Surgeon: Niraj Bojorquez MD;  [facility];  Service: Plastics    INCISION AND DRAINAGE OF WOUND Right 2/17/2021    Procedure: INCISION AND DRAINAGE (I&D) EXTREMITY;  Surgeon: Harry Zamudio DPM;  [facility];  Service: Podiatry    INCISION AND DRAINAGE OF WOUND Left 10/26/2023    Procedure: INCISION AND DRAINAGE (I&D) EXTREMITY; WASHOUT; REMOVAL OF FOREIGN BODY;  Surgeon: Satnam Ponce DPM;  [facility];  Service: Podiatry    ORIF FOOT FRACTURE Right 3/4/2021    Procedure: VAC PLACEMENT; SCREW FIXATION RIGHT FOOT FUSION;  Surgeon: Harry Zamudio DPM;  [facility];  Service: Podiatry    DC AMPUTATION FOOT TRANSMETARSAL Right 2/22/2021    Procedure: AMPUTATION TRANSMETATARSAL (TMA), REMOVAL NAIL IM T2 ICF HARDWARE;  Surgeon: Harry Zamudio DPM;  [facility];  Service: Podiatry    DC AMPUTATION METATARSAL W/TOE SINGLE Left 11/29/2023    Procedure: LEFT FOURTH RAY RESECTION FOOT;  Surgeon: Harry Zamudio DPM;  [facility];  Service: Podiatry    DC ARTHRD MIDTARSL/TARS MLT/TRANSVRS W/OSTEOT Right 2/12/2021    Procedure: foot ARTHRODESIS/FUSION;  Surgeon: Harry Zamudio DPM;  [facility];  Service: Podiatry    DC DEBRIDEMENT BONE 1ST 20 SQ CM/< Right 12/2/2021    Procedure: DEBRIDEMENT OF TARSAL BONES WITH ANITBIODIC BEADS;  Surgeon: Harry Zamudio DPM;  [facility];  Service: Podiatry    DC LNGTH/SHRT TENDON LEG/ANKLE 1 TENDON SPX Right 2/12/2021    Procedure: LENGTHEN TIBIAL TENDON, TRANS HEEL CORD;  Surgeon: Harry Zamudio DPM;  [facility];  Service: Podiatry    DC MUSC MYOCUTANEOUS/FASCIOCUTANEOUS FLAP TRUNK Right 4/12/2021    Procedure: RECONSTRUCTION MICROSURGICAL W/ FREE FLAP;  Surgeon: Niraj Bojorquez MD;  [facility];  Service: Plastics    PA MUSC MYOCUTANEOUS/FASCIOCUTANEOUS FLAP TRUNK Right 4/12/2021    Procedure: TAKEBACK RECONSTRUCTION MICROSURGICAL W/ FREE FLAP;  Surgeon: Niraj Bojorquez MD;  [facility];  Service: Plastics    PA MUSC MYOCUTANEOUS/FASCIOCUTANEOUS FLAP TRUNK Right 4/13/2021    Procedure: RECONSTRUCTION MICROSURGICAL W/ FREE FLAP, RE EXPLORATION, MICRO VASCULAR REVISION;  Surgeon: Niraj Bojorquez MD;  [facility];  Service: Plastics    PA OSTC PRTL EXOSTC/CONDYLC METAR HEAD Right 12/29/2020    Procedure: EXCISION EXOSTOSIS;  Surgeon: Harry Zamudio DPM;  [facility];  Service: Podiatry    PA SECONDARY CLOSURE SURG WOUND/DEHSN XTNSV/COMP Right 12/29/2020    Procedure: PRIMARY DELAYED CLOSURE;  Surgeon: Harry Zamudio DPM;  [facility];  Service: Podiatry    TOE AMPUTATION Right     partial great toe    TONSILLECTOMY      VAC DRESSING APPLICATION Right 3/1/2021    Procedure: APPLICATION VAC DRESSING EXTREMITY;  Surgeon: Niraj Bojorquez MD;  [facility];  Service: Plastics    VASECTOMY  1992    WOUND DEBRIDEMENT Right 3/1/2021    Procedure: DEBRIDEMENT LOWER EXTREMITY (WASH OUT);  Surgeon: Niraj Bojorquez MD;  [facility];  Service: Plastics    WOUND DEBRIDEMENT Right 4/7/2021    Procedure: DEBRIDEMENT RIGHT FOOT;  Surgeon: Niraj Bojorquez MD;  [facility];  Service: Plastics    WOUND DEBRIDEMENT Right 4/12/2021    Procedure: DEBRIDEMENT LOWER EXTREMITY (WASH OUT);  Surgeon: Niraj Bojorquez MD;  [facility];  Service: Plastics    WOUND DEBRIDEMENT Right 4/27/2021    Procedure: DEBRIDEMENT LOWER EXTREMITY (WASH OUT): RIGHT FOOT;  Surgeon: Niraj Bojorquez MD;  [facility];  Service: Plastics     Family History   Problem Relation Age of Onset    Diabetes Father         mellitus      Social History     Socioeconomic History    Marital status: /Civil Union      Spouse name: None    Number of children: None    Years of education: None    Highest education level: None   Occupational History    None   Tobacco Use    Smoking status: Former     Current packs/day: 0.00     Types: Cigarettes     Quit date: 1988     Years since quittin.2     Passive exposure: Past    Smokeless tobacco: Former    Tobacco comments:     exposure to passive smoke   Vaping Use    Vaping status: Never Used   Substance and Sexual Activity    Alcohol use: Yes     Comment: occasionally    Drug use: Not Currently    Sexual activity: Yes     Partners: Female   Other Topics Concern    None   Social History Narrative    None     Social Drivers of Health     Financial Resource Strain: Not on file   Food Insecurity: No Food Insecurity (2024)    Nursing - Inadequate Food Risk Classification     Worried About Running Out of Food in the Last Year: Never true     Ran Out of Food in the Last Year: Never true     Ran Out of Food in the Last Year: Not on file   Transportation Needs: No Transportation Needs (2024)    PRAPARE - Transportation     Lack of Transportation (Medical): No     Lack of Transportation (Non-Medical): No   Physical Activity: Not on file   Stress: Not on file   Social Connections: Not on file   Intimate Partner Violence: Not on file   Housing Stability: Low Risk  (2024)    Housing Stability Vital Sign     Unable to Pay for Housing in the Last Year: No     Number of Times Moved in the Last Year: 0     Homeless in the Last Year: No        Current Outpatient Medications:     cephalexin (KEFLEX) 500 mg capsule, Take 1 capsule (500 mg total) by mouth every 6 (six) hours for 7 days, Disp: 28 capsule, Rfl: 0    acetaminophen (Mapap Arthritis Pain) 650 mg CR tablet, Take 1 tablet (650 mg total) by mouth 2 (two) times a day, Disp: 60 tablet, Rfl: 1    atorvastatin (LIPITOR) 40 mg tablet, TAKE 1 TABLET (40 MG TOTAL) BY MOUTH DAILY, Disp: 90 tablet, Rfl: 2    Continuous Glucose  Sensor (FreeStyle Taylor 3 Sensor) MISC, Use 1 each every 14 (fourteen) days, Disp: 1 each, Rfl: 0    diltiazem (CARDIZEM) 120 MG tablet, TAKE 1 TABLET (120 MG TOTAL) BY MOUTH TWICE A DAY, Disp: 180 tablet, Rfl: 1    Eliquis 5 MG, Take 1 tablet (5 mg total) by mouth 2 (two) times a day, Disp: 60 tablet, Rfl: 11    glucose blood (Accu-Chek Guide) test strip, USE DAILY, Disp: 100 strip, Rfl: 2    Insulin Glargine Solostar (Lantus SoloStar) 100 UNIT/ML SOPN, Inject 0.35 mL (35 Units total) under the skin daily at bedtime, Disp: 45 mL, Rfl: 1    insulin lispro (HumaLOG) 100 units/mL injection pen, Inject up to 20 Units under the skin 3 (three) times a day with meals, Disp: 15 mL, Rfl: 5    Insulin Pen Needle (Easy Comfort Pen Needles) 33G X 5 MM MISC, USE AS DIRECTED, Disp: 100 each, Rfl: 4    Lancets (onetouch ultrasoft) lancets, Use as instructed, Disp: 100 each, Rfl: 3    Lancets (onetouch ultrasoft) lancets, Use daily, Disp: 100 each, Rfl: 3    lisinopril (ZESTRIL) 40 mg tablet, Take 1 tablet (40 mg total) by mouth daily, Disp: 90 tablet, Rfl: 3    metFORMIN (GLUCOPHAGE-XR) 750 mg 24 hr tablet, Take 1 tablet (750 mg total) by mouth 2 (two) times a day with meals, Disp: 180 tablet, Rfl: 3    metoprolol succinate (TOPROL-XL) 100 mg 24 hr tablet, TAKE 1 TABLET (100 MG TOTAL) BY MOUTH 2 (TWO) TIMES A DAY, Disp: 180 tablet, Rfl: 0    mupirocin (BACTROBAN) 2 % ointment, Apply topically 3 (three) times a day, Disp: 15 g, Rfl: 0    tamsulosin (FLOMAX) 0.4 mg, Take 1 capsule (0.4 mg total) by mouth daily with dinner, Disp: 90 capsule, Rfl: 2    Review of Systems   Constitutional:  Negative for chills and fever.   Respiratory:  Negative for cough and shortness of breath.    Cardiovascular:  Negative for chest pain.   Gastrointestinal:  Negative for nausea and vomiting.   Skin:  Positive for wound.   Neurological:  Positive for numbness. Negative for weakness.       Objective:  /76   Pulse (!) 54   Temp (!) 97.2 °F  (36.2 °C)   Resp 16         Physical Exam  Vitals and nursing note reviewed.   Constitutional:       General: He is not in acute distress.     Appearance: He is not toxic-appearing or diaphoretic.   Cardiovascular:      Rate and Rhythm: Normal rate and regular rhythm.      Pulses:           Dorsalis pedis pulses are 1+ on the right side.        Posterior tibial pulses are 0 on the right side.   Pulmonary:      Effort: Pulmonary effort is normal. No respiratory distress.   Musculoskeletal:         General: Deformity present. No signs of injury.      Right foot: No foot drop.      Comments: S/P partial foot amputation right.   Skin:     General: Skin is warm.      Capillary Refill: Capillary refill takes less than 2 seconds.      Coloration: Skin is not cyanotic or mottled.      Findings: Wound present. No abscess.      Nails: There is no clubbing.      Comments: Full thickness DFU in right plantar foot.  Decrease in size and depth.  Wound bed is granular without signs of infection.  No deep probing.  Partial thick wound left 2nd toe.  Wound bed is little dry.  Mild periwound redness/ irritation.  Wound does not probe to bone.  See wound assessment and photo.   Neurological:      General: No focal deficit present.      Mental Status: He is alert and oriented to person, place, and time.      Cranial Nerves: No cranial nerve deficit.      Sensory: Sensory deficit present.      Motor: No weakness.      Coordination: Coordination normal.   Psychiatric:         Mood and Affect: Mood normal.         Behavior: Behavior normal.         Thought Content: Thought content normal.         Judgment: Judgment normal.                                Lab Results   Component Value Date    HGBA1C 8.0 (A) 07/12/2024       Wound Instructions:  Orders Placed This Encounter   Procedures    Splint, Casting, Strapping     This order was created via procedure documentation    Wound cleansing and dressings Diabetic Ulcer Right;Plantar Foot      Shower no. DO NOT get cast wet.               Right foot wound:   Apply skin prep under felt pad.  Double Layer 1/4 felt pad applied to heel.   Apply acticoat 7 to the wound.     Cover with alginate and convamax.  Secure with kerlix and tape.  TCC applied by Dr. Ponce.   Change dressing weekly at the wound center.         Limit your walking for 24 hours until the cast sets.   Do not walk without the boot or the cast will crack.      Do not put any objects in the cast to scratch.          Follow up at the wound center in one week.     Standing Status:   Future     Expiration Date:   1/20/2025    Wound cleansing and dressings Diabetic Ulcer Anterior;Left Toe D3, third     Shower no. DO NOT get cast wet.       Left third toe wound:  Apply xeroform to the wound.  Cover with dry gauze.  Secure with rolled gauze and tape.   Change dressing three times a week.             Wear surgical shoe to left foot at all times. Remove for sleeping.             Follow up at the wound center in one week.     Standing Status:   Future     Expiration Date:   1/20/2025    Wound Procedure Treatment Diabetic Ulcer Anterior;Left Toe D3, third     This order was created via procedure documentation    Wound Procedure Treatment Diabetic Ulcer Right;Plantar Foot     This order was created via procedure documentation             Satnam Ponce DPM

## 2025-01-13 NOTE — PATIENT INSTRUCTIONS
Orders Placed This Encounter   Procedures    Splint, Casting, Strapping     This order was created via procedure documentation    Wound cleansing and dressings Diabetic Ulcer Right;Plantar Foot     Shower no. DO NOT get cast wet.               Right foot wound:   Apply skin prep under felt pad.  Double Layer 1/4 felt pad applied to heel.   Apply acticoat 7 to the wound.     Cover with alginate and convamax.  Secure with kerlix and tape.  TCC applied by Dr. Ponce.   Change dressing weekly at the wound center.         Limit your walking for 24 hours until the cast sets.   Do not walk without the boot or the cast will crack.      Do not put any objects in the cast to scratch.          Follow up at the wound center in one week.     Standing Status:   Future     Expiration Date:   1/20/2025    Wound cleansing and dressings Diabetic Ulcer Anterior;Left Toe D3, third     Shower no. DO NOT get cast wet.       Left third toe wound:  Apply xeroform to the wound.  Cover with dry gauze.  Secure with rolled gauze and tape.   Change dressing three times a week.             Wear surgical shoe to left foot at all times. Remove for sleeping.             Follow up at the wound center in one week.     Standing Status:   Future     Expiration Date:   1/20/2025

## 2025-01-13 NOTE — PROGRESS NOTES
Wound Procedure Treatment Diabetic Ulcer Right;Plantar Foot    Performed by: Tanner Nielson RN  Authorized by: Satnam Ponce DPM    Associated wounds:   Wound 12/16/24 Diabetic Ulcer Foot Right;Plantar  Wound cleansed with:  Soap and water  Applied to periwound:  Skin prep and Other  Applied primary dressing:  Acticoat and Calcium alginate  Applied secondary dressing:  ABD  Dressing secured with:  Kacy and Tape  Offloading device appllied:  TCC  Comments:  Felt offloading pad, Acticoat 7

## 2025-01-13 NOTE — PROGRESS NOTES
Wound Procedure Treatment Diabetic Ulcer Anterior;Left Toe D3, third    Performed by: Tanner Nielson RN  Authorized by: Satnam Pnoce DPM    Associated wounds:   Wound 12/30/24 Diabetic Ulcer Toe D3, third Anterior;Left  Wound cleansed with:  NSS  Applied primary dressing:  Non adherent contact layer  Applied secondary dressing:  Gauze  Dressing secured with:  Tape  Offloading device appllied:  Surgical shoe  Comments:  Xeroform

## 2025-01-14 NOTE — ASSESSMENT & PLAN NOTE
Check A1c.  Lab Results   Component Value Date    HGBA1C 8.0 (A) 07/12/2024     Orders:  •  IRIS Diabetic eye exam  •  Albumin / creatinine urine ratio; Future  •  Hemoglobin A1C; Future  •  Albumin / creatinine urine ratio; Future  •  Comprehensive metabolic panel; Future  •  Hemoglobin A1C; Future

## 2025-01-14 NOTE — PROGRESS NOTES
Name: Won Gaitan Jr.      : 1950      MRN: [id number]  Encounter Provider: Pieter Torres DO  Encounter Date: 2025   Encounter department: [facility]  :  Assessment & Plan  Type 2 diabetes mellitus with diabetic neuropathy, unspecified whether long term insulin use (HCC)  Check A1c.  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       S/P transmetatarsal amputation of foot, right (HCC)  stable       Osteomyelitis of foot, acute (HCC)         Ventricular fibrillation (HCC)         Human immunodeficiency virus (HIV) disease (HCC)         Atrial fibrillation with RVR (HCC)  On GDMT       Anxiety and depression    Orders:  •  escitalopram (LEXAPRO) 5 mg tablet; Take 1 tablet (5 mg total) by mouth daily    Decreased libido    Orders:  •  Testosterone, free, total; Future           History of Present Illness     DM2 followup and discuss chronic conditions.    Needs labs done.      Review of Systems   Constitutional:  Negative for chills and fever.   HENT:  Negative for ear pain and sore throat.    Eyes:  Negative for pain, redness and visual disturbance.   Respiratory:  Negative for cough and shortness of breath.    Cardiovascular:  Negative for chest pain and palpitations.   Gastrointestinal:  Negative for abdominal pain and vomiting.   Genitourinary:  Negative for dysuria and hematuria.   Musculoskeletal:  Negative for arthralgias and back pain.   Skin:  Positive for wound. Negative for color change and rash.   Neurological:  Negative for seizures and syncope.   All other systems reviewed and are  "negative.      Objective   /60 (BP Location: Left arm, Patient Position: Sitting, Cuff Size: Standard)   Pulse 77   Temp (!) 97.2 °F (36.2 °C) (Temporal)   Ht 6' 4\" (1.93 m)   SpO2 99%   BMI 29.21 kg/m²      Physical Exam  Vitals reviewed.   Constitutional:       Appearance: Normal appearance. He is not ill-appearing.   HENT:      Head: Normocephalic and atraumatic.      Nose: Nose normal.      Mouth/Throat:      Mouth: Mucous membranes are moist.      Pharynx: Oropharynx is clear.   Eyes:      Conjunctiva/sclera: Conjunctivae normal.      Pupils: Pupils are equal, round, and reactive to light.   Cardiovascular:      Rate and Rhythm: Normal rate and regular rhythm.      Pulses: Normal pulses.      Heart sounds: Normal heart sounds. No murmur heard.  Pulmonary:      Effort: Pulmonary effort is normal.      Breath sounds: Normal breath sounds. No wheezing.   Abdominal:      General: Bowel sounds are normal.      Palpations: Abdomen is soft.   Musculoskeletal:         General: Tenderness and signs of injury present.      Cervical back: Normal range of motion.   Neurological:      Mental Status: He is alert and oriented to person, place, and time.      Motor: Weakness present.      Gait: Gait abnormal.   Psychiatric:         Mood and Affect: Mood normal.         Behavior: Behavior normal.         Thought Content: Thought content normal.         Judgment: Judgment normal.       Administrative Statements   I have spent a total time of 37 minutes in caring for this patient on the day of the visit/encounter including Diagnostic results, Prognosis, Risks and benefits of tx options, Instructions for management, Patient and family education, Importance of tx compliance, Risk factor reductions, Impressions, Counseling / Coordination of care, Documenting in the medical record, Reviewing / ordering tests, medicine, procedures  , and Obtaining or reviewing history  .   "

## 2025-01-16 ENCOUNTER — APPOINTMENT (OUTPATIENT)
Dept: LAB | Age: 75
End: 2025-01-16
Payer: COMMERCIAL

## 2025-01-16 DIAGNOSIS — E11.40 TYPE 2 DIABETES MELLITUS WITH DIABETIC NEUROPATHY, WITHOUT LONG-TERM CURRENT USE OF INSULIN (HCC): ICD-10-CM

## 2025-01-16 DIAGNOSIS — R68.82 DECREASED LIBIDO: ICD-10-CM

## 2025-01-16 DIAGNOSIS — E11.40 TYPE 2 DIABETES MELLITUS WITH DIABETIC NEUROPATHY, UNSPECIFIED WHETHER LONG TERM INSULIN USE (HCC): ICD-10-CM

## 2025-01-16 LAB
EST. AVERAGE GLUCOSE BLD GHB EST-MCNC: 174 MG/DL
HBA1C MFR BLD: 7.7 %

## 2025-01-16 PROCEDURE — 82570 ASSAY OF URINE CREATININE: CPT

## 2025-01-16 PROCEDURE — 84403 ASSAY OF TOTAL TESTOSTERONE: CPT

## 2025-01-16 PROCEDURE — 84402 ASSAY OF FREE TESTOSTERONE: CPT

## 2025-01-16 PROCEDURE — 82043 UR ALBUMIN QUANTITATIVE: CPT

## 2025-01-16 PROCEDURE — 83036 HEMOGLOBIN GLYCOSYLATED A1C: CPT

## 2025-01-16 PROCEDURE — 36415 COLL VENOUS BLD VENIPUNCTURE: CPT

## 2025-01-16 PROCEDURE — 80053 COMPREHEN METABOLIC PANEL: CPT

## 2025-01-17 LAB
ALBUMIN SERPL BCG-MCNC: 4.4 G/DL (ref 3.5–5)
ALP SERPL-CCNC: 56 U/L (ref 34–104)
ALT SERPL W P-5'-P-CCNC: 17 U/L (ref 7–52)
ANION GAP SERPL CALCULATED.3IONS-SCNC: 8 MMOL/L (ref 4–13)
AST SERPL W P-5'-P-CCNC: 14 U/L (ref 13–39)
BILIRUB SERPL-MCNC: 0.51 MG/DL (ref 0.2–1)
BUN SERPL-MCNC: 24 MG/DL (ref 5–25)
CALCIUM SERPL-MCNC: 9 MG/DL (ref 8.4–10.2)
CHLORIDE SERPL-SCNC: 103 MMOL/L (ref 96–108)
CO2 SERPL-SCNC: 29 MMOL/L (ref 21–32)
CREAT SERPL-MCNC: 1.05 MG/DL (ref 0.6–1.3)
CREAT UR-MCNC: 103.8 MG/DL
GFR SERPL CREATININE-BSD FRML MDRD: 70 ML/MIN/1.73SQ M
GLUCOSE SERPL-MCNC: 141 MG/DL (ref 65–140)
MICROALBUMIN UR-MCNC: 45.3 MG/L
MICROALBUMIN/CREAT 24H UR: 44 MG/G CREATININE (ref 0–30)
POTASSIUM SERPL-SCNC: 4.4 MMOL/L (ref 3.5–5.3)
PROT SERPL-MCNC: 7.5 G/DL (ref 6.4–8.4)
SODIUM SERPL-SCNC: 140 MMOL/L (ref 135–147)
TESTOST FREE SERPL-MCNC: 11.5 PG/ML (ref 6.6–18.1)
TESTOST SERPL-MCNC: 439 NG/DL (ref 264–916)

## 2025-01-20 ENCOUNTER — OFFICE VISIT (OUTPATIENT)
Dept: WOUND CARE | Facility: CLINIC | Age: 75
End: 2025-01-20
Payer: COMMERCIAL

## 2025-01-20 VITALS
DIASTOLIC BLOOD PRESSURE: 72 MMHG | SYSTOLIC BLOOD PRESSURE: 148 MMHG | HEART RATE: 76 BPM | TEMPERATURE: 97.1 F | RESPIRATION RATE: 16 BRPM

## 2025-01-20 DIAGNOSIS — L97.412 ULCER OF HEEL AND MIDFOOT, RIGHT, WITH FAT LAYER EXPOSED (HCC): Primary | ICD-10-CM

## 2025-01-20 DIAGNOSIS — L97.521 ULCER OF TOE OF LEFT FOOT, LIMITED TO BREAKDOWN OF SKIN (HCC): ICD-10-CM

## 2025-01-20 DIAGNOSIS — E11.40 TYPE 2 DIABETES MELLITUS WITH DIABETIC NEUROPATHY, UNSPECIFIED WHETHER LONG TERM INSULIN USE (HCC): ICD-10-CM

## 2025-01-20 PROCEDURE — 29445 APPL RIGID TOT CNTC LEG CAST: CPT | Performed by: NURSE PRACTITIONER

## 2025-01-20 PROCEDURE — 97597 DBRDMT OPN WND 1ST 20 CM/<: CPT | Performed by: NURSE PRACTITIONER

## 2025-01-20 RX ORDER — LIDOCAINE 40 MG/G
CREAM TOPICAL ONCE
Status: COMPLETED | OUTPATIENT
Start: 2025-01-20 | End: 2025-01-20

## 2025-01-20 RX ADMIN — LIDOCAINE 1 APPLICATION: 40 CREAM TOPICAL at 10:08

## 2025-01-20 NOTE — ASSESSMENT & PLAN NOTE
Orders:    lidocaine (LMX) 4 % cream    Wound cleansing and dressings Diabetic Ulcer Anterior;Left Toe D3, third; Future    Wound Procedure Treatment Diabetic Ulcer Anterior;Left Toe D3, third

## 2025-01-20 NOTE — PATIENT INSTRUCTIONS
Orders Placed This Encounter   Procedures    Wound cleansing and dressings Diabetic Ulcer Anterior;Left Toe D3, third     Left third toe wound:  Apply xeroform to the wound.  Cover with dry gauze.  Secure with rolled gauze and tape.   Change dressing three times a week.      Wear surgical shoe to left foot at all times. Remove for sleeping.       Follow up at the wound center in one week.     Standing Status:   Future     Expected Date:   1/20/2025     Expiration Date:   1/27/2025    Wound cleansing and dressings Diabetic Ulcer Right;Plantar Foot     Shower no. DO NOT get cast wet.          Right foot wound:   Apply skin prep under felt pad.  1/4 inch felt pad to heel  Apply acticoat 7 to the wound.     Cover with alginate and convamax.  Secure with kerlix and tape.    Change dressing weekly at the wound center.          Limit your walking for 24 hours until the cast sets.   Do not walk without the boot or the cast will crack.      Do not put any objects in the cast to scratch.          Follow up at the wound center in one week.    Watch for signs of infection these include a fever of 100.4°F (38°C) or higher, chills  Signs of wound infection include increasing swelling, redness, warmth, pain around the wound. Foul smell coming from wound  Go to the closest Emergency Room with signs of infection to be evaluated.     Standing Status:   Future     Expected Date:   1/20/2025     Expiration Date:   1/27/2025

## 2025-01-20 NOTE — PROGRESS NOTES
"Debridement   Wound 12/30/24 Diabetic Ulcer Toe D3, third Anterior;Left    Universal Protocol:  procedure performed by consultantConsent: Written consent obtained.  Consent given by: patient  Time out: Immediately prior to procedure a \"time out\" was called to verify the correct patient, procedure, equipment, support staff and site/side marked as required.  Timeout called at: 1/20/2025 12:59 PM.  Patient identity confirmed: verbally with patient    Debridement Details  Performed by: NP  Debridement type: selective  Pain control: lidocaine 4%      Post-debridement measurements  Length (cm): 0.5  Width (cm): 1  Depth (cm): 0.1  Percent debrided: 100%  Surface Area (cm^2): 0.5  Area Debrided (cm^2): 0.5  Volume (cm^3): 0.05    Devitalized tissue debrided: biofilm, fibrin and slough  Instrument(s) utilized: curette  Bleeding: small  Hemostasis obtained with: pressure  Procedural pain (0-10): 0  Post-procedural pain: 0   Response to treatment: procedure was tolerated well        "

## 2025-01-20 NOTE — ASSESSMENT & PLAN NOTE
Lab Results   Component Value Date    HGBA1C 7.7 (H) 01/16/2025          Plan:  1.  F/U visit.  Right foot cleansed normal saline and gauze.  Wound improving and measuring smaller.  Continue current plan of care.  TCC reapplied today.    2.  Left third toe wound debrided.  Wound measuring relatively the same but has a clean granular wound base.  Continue current plan of care.    3.  A1C results reviewed with the patient today.  Patient is to continue to follow recommendations to achieve tight glycemic control    4.  Patient will follow-up in 1 week with Dr. Ponce

## 2025-01-20 NOTE — PROGRESS NOTES
Name: Won Gaitan Jr.      : 1950      MRN: [id number]  Encounter Provider: KIRIT Long  Encounter Date: 2025   Encounter department: [facility]  :  Assessment & Plan  Ulcer of heel and midfoot, right, with fat layer exposed (HCC)    Orders:    lidocaine (LMX) 4 % cream    Wound cleansing and dressings Diabetic Ulcer Right;Plantar Foot; Future    Wound Procedure Treatment Diabetic Ulcer Right;Plantar Foot    Ulcer of toe of left foot, limited to breakdown of skin (HCC)    Orders:    lidocaine (LMX) 4 % cream    Wound cleansing and dressings Diabetic Ulcer Anterior;Left Toe D3, third; Future    Wound Procedure Treatment Diabetic Ulcer Anterior;Left Toe D3, third    Type 2 diabetes mellitus with diabetic neuropathy, unspecified whether long term insulin use ([facility])    Lab Results   Component Value Date    HGBA1C 7.7 (H) 2025          Plan:  1.  F/U visit.  Right foot cleansed normal saline and gauze.  Wound improving and measuring smaller.  Continue current plan of care.  TCC reapplied today.    2.  Left third toe wound debrided.  Wound measuring relatively the same but has a clean granular wound base.  Continue current plan of care.    3.  A1C results reviewed with the patient today.  Patient is to continue to follow recommendations to achieve tight glycemic control    4.  Patient will follow-up in 1 week with Dr. Ponce      History of Present Illness   Chief Complaint   Patient presents with    Follow Up Wound Care Visit     R TCC, L toe wound   F/u visit for Parks grade 1 diabetic foot ulcers of his bilateral feet.  No new complaints.  He denies any pain, fevers, or chills.  Tolerating TCC      Objective   /72   Pulse 76   Temp (!) 97.1 °F (36.2 °C)   Resp 16       Wound 24 Diabetic Ulcer Foot Right;Plantar (Active)   Enter Parks score: Parks Grade 1: Partial or full-thickness ulcer (superficial) 25 0955   Wound Image   25  "0955   Wound Description Pink 01/20/25 0955   Lavern-wound Assessment Intact;Callus 01/20/25 0955   Wound Length (cm) 0.1 cm 01/20/25 0955   Wound Width (cm) 0.1 cm 01/20/25 0955   Wound Depth (cm) 0.2 cm 01/20/25 0955   Wound Surface Area (cm^2) 0.01 cm^2 01/20/25 0955   Wound Volume (cm^3) 0.002 cm^3 01/20/25 0955   Calculated Wound Volume (cm^3) 0 cm^3 01/20/25 0955   Change in Wound Size % 100 01/20/25 0955   Number of underminings 1 12/16/24 1043   Undermining 1 0.1 01/13/25 0939   Undermining 1 is depth extending from 11-2 01/13/25 0939   Drainage Amount Small 01/20/25 0955   Drainage Description Serosanguineous 01/20/25 0955   Non-staged Wound Description Full thickness 01/20/25 0955   Wound packed? No 01/13/25 0939   Dressing Status Removed 01/06/25 1026       Wound 12/30/24 Diabetic Ulcer Toe D3, third Anterior;Left (Active)   Enter Parks score: Parks Grade 1: Partial or full-thickness ulcer (superficial) 01/20/25 0956   Wound Image   01/20/25 0954   Wound Description Pink;Yellow 01/20/25 0956   Lavern-wound Assessment Intact 01/20/25 0956   Wound Length (cm) 0.5 cm 01/20/25 0956   Wound Width (cm) 1 cm 01/20/25 0956   Wound Depth (cm) 0.1 cm 01/20/25 0956   Wound Surface Area (cm^2) 0.5 cm^2 01/20/25 0956   Wound Volume (cm^3) 0.05 cm^3 01/20/25 0956   Calculated Wound Volume (cm^3) 0.05 cm^3 01/20/25 0956   Change in Wound Size % 16.67 01/20/25 0956   Drainage Amount Scant 01/20/25 0956   Drainage Description Serosanguineous 01/20/25 0956   Non-staged Wound Description Full thickness 01/20/25 0956   Wound packed? No 01/13/25 0939   Dressing Status Removed 01/06/25 1026       Cast Application    Date/Time: 1/20/2025 9:30 AM    Performed by: KIRIT Long  Authorized by: KIRIT Long  [place] Protocol:  procedure performed by consultantConsent: Written consent obtained.  Consent given by: patient  Time out: Immediately prior to procedure a \"time out\" was called to verify the correct " patient, procedure, equipment, support staff and site/side marked as required.  Timeout called at: 1/20/2025 12:58 PM.  Patient identity confirmed: verbally with patient    Pre-procedure details:     Sensation:  Unchanged  Procedure details:     Laterality:  Right    Location:  Foot    Foot:  R footCast type:  Total contact      Post-procedure details:     Pain:  Unchanged    Sensation:  Unchanged    Patient tolerance of procedure:  Tolerated well, no immediate complications

## 2025-01-20 NOTE — PROGRESS NOTES
Wound Procedure Treatment Diabetic Ulcer Right;Plantar Foot    Performed by: Lanette Faulkner RN  Authorized by: KIRIT Long    Associated wounds:   Wound 12/16/24 Diabetic Ulcer Foot Right;Plantar  Wound cleansed with:  Soap and water  Applied to periwound:  Skin prep  Applied primary dressing:  Acticoat  Applied secondary dressing:  Gauze  Dressing secured with:  Kacy and Tape  Offloading device appllied:  TCC and Felt padding 1/4 inch  Wound Procedure Treatment Diabetic Ulcer Anterior;Left Toe D3, third    Performed by: Lanette Faulkner RN  Authorized by: KIRIT Long    Associated wounds:   Wound 12/30/24 Diabetic Ulcer Toe D3, third Anterior;Left  Wound cleansed with:  NSS  Applied primary dressing:  Other  Applied secondary dressing:  Gauze  Dressing secured with:  Kacy and Tape  Comments:  Xeroform

## 2025-01-27 ENCOUNTER — HOSPITAL ENCOUNTER (OUTPATIENT)
Dept: RADIOLOGY | Facility: HOSPITAL | Age: 75
Discharge: HOME | End: 2025-01-27
Payer: COMMERCIAL

## 2025-01-27 ENCOUNTER — OFFICE VISIT (OUTPATIENT)
Dept: WOUND CARE | Facility: CLINIC | Age: 75
End: 2025-01-27
Payer: COMMERCIAL

## 2025-01-27 VITALS
SYSTOLIC BLOOD PRESSURE: 130 MMHG | HEART RATE: 76 BPM | RESPIRATION RATE: 18 BRPM | TEMPERATURE: 97.9 F | DIASTOLIC BLOOD PRESSURE: 70 MMHG

## 2025-01-27 DIAGNOSIS — L97.412 ULCER OF HEEL AND MIDFOOT, RIGHT, WITH FAT LAYER EXPOSED (HCC): Primary | ICD-10-CM

## 2025-01-27 DIAGNOSIS — L97.521 ULCER OF TOE OF LEFT FOOT, LIMITED TO BREAKDOWN OF SKIN (HCC): ICD-10-CM

## 2025-01-27 DIAGNOSIS — E11.40 TYPE 2 DIABETES MELLITUS WITH DIABETIC NEUROPATHY, UNSPECIFIED WHETHER LONG TERM INSULIN USE (HCC): ICD-10-CM

## 2025-01-27 PROCEDURE — 97597 DBRDMT OPN WND 1ST 20 CM/<: CPT | Performed by: PODIATRIST

## 2025-01-27 PROCEDURE — 73630 X-RAY EXAM OF FOOT: CPT

## 2025-01-27 PROCEDURE — 99213 OFFICE O/P EST LOW 20 MIN: CPT | Performed by: PODIATRIST

## 2025-01-27 PROCEDURE — 29445 APPL RIGID TOT CNTC LEG CAST: CPT | Performed by: PODIATRIST

## 2025-01-27 NOTE — PROGRESS NOTES
"Debridement   Wound 12/30/24 Diabetic Ulcer Toe D3, third Anterior;Left    Universal Protocol:  Consent: Verbal consent obtained.  Risks and benefits: risks, benefits and alternatives were discussed  Consent given by: patient  Time out: Immediately prior to procedure a \"time out\" was called to verify the correct patient, procedure, equipment, support staff and site/side marked as required.  Timeout called at: 1/27/2025 9:53 AM.  Patient understanding: patient states understanding of the procedure being performed  Patient identity confirmed: verbally with patient    Debridement Details  Performed by: physician  Debridement type: selective  Pain control: lidocaine 4%      Post-debridement measurements  Length (cm): 0.5  Width (cm): 0.8  Depth (cm): 0.1  Percent debrided: 100%  Surface Area (cm^2): 0.4  Area Debrided (cm^2): 0.4  Volume (cm^3): 0.04    Tissue and other material debrided: dermis and epidermis  Devitalized tissue debrided: fibrin and slough  Instrument(s) utilized: blade  Technique utilized: excisionalBleeding: small  Hemostasis obtained with: pressure  Procedural pain (0-10): 0  Post-procedural pain: 0   Response to treatment: procedure was tolerated well        "

## 2025-01-27 NOTE — PROGRESS NOTES
Wound Procedure Treatment Diabetic Ulcer Right;Plantar Foot    Performed by: Lanette Faulkner RN  Authorized by: Satnam Ponce DPM    Associated wounds:   Wound 12/16/24 Diabetic Ulcer Foot Right;Plantar  Wound cleansed with:  Soap and water and NSS  Applied to periwound:  Skin prep  Applied primary dressing:  Acticoat  Applied secondary dressing:  Gauze  Dressing secured with:  Kacy and Tape  Offloading device appllied:  Felt padding 1/4 inch and TCC  Comments:  Acticoat 7  Wound Procedure Treatment Diabetic Ulcer Anterior;Left Toe D3, third    Performed by: Lanette Faulkner RN  Authorized by: Satnam Ponce DPM    Associated wounds:   Wound 12/30/24 Diabetic Ulcer Toe D3, third Anterior;Left  Wound cleansed with:  NSS  Applied primary dressing:  Other  Applied secondary dressing:  Gauze  Dressing secured with:  Kacy and Tape  Comments:  Elo CASTANO

## 2025-01-27 NOTE — PROGRESS NOTES
Patient ID: Won Gaitan Jr. is a 74 y.o. male Date of Birth 1950       Chief Complaint   Patient presents with    Follow Up Wound Care Visit     R TCC       Allergies:  Simvastatin and Itraconazole    Assessments:      Diagnosis ICD-10-CM Associated Orders   1. Ulcer of heel and midfoot, right, with fat layer exposed ([facility])  L97.412 Splint, Casting, Strapping     Wound cleansing and dressings Diabetic Ulcer Right;Plantar Foot     Wound Procedure Treatment Diabetic Ulcer Right;Plantar Foot      2. Ulcer of toe of left foot, limited to breakdown of skin ([facility])  L97.521 Wound cleansing and dressings Diabetic Ulcer Anterior;Left Toe D3, third     Wound Procedure Treatment Diabetic Ulcer Anterior;Left Toe D3, third     XR foot 3+ vw left     Debridement Diabetic Ulcer Anterior;Left Toe D3, third      3. Type 2 diabetes mellitus with diabetic neuropathy, unspecified whether long term insulin use ([facility])  E11.40 Splint, Casting, Strapping     Debridement Diabetic Ulcer Anterior;Left Toe D3, third               Plan:   Reviewed medical records.  Patient was counseled and educated on the condition and the diagnosis.    2. The diagnosis, treatment options and prognosis were discussed with the patient.    3.  Right foot ulcer is almost healed.  Continue off-loading PAD and TCC.  TCC was applied per protocol.  Left 2nd toe ulcer was debrided.  Start Puracol Ag.  Check X-ray of left foot.  Xeroform to left 2nd toe wound.  Pt to wear surgical shoe on left foot at all times.    4. Stressed on patient compliance about proper off-loading, and staying off of foot to reduce the risk of infection, non-healing, and limb loss.    5. Patient will return in 1 week for re-evaluation.         Imaging: I have personally reviewed pertinent films in PACS  Labs, pathology, and Other Studies: I have personally reviewed pertinent reports.        Splint, Casting, Strapping    Date/Time: 1/27/2025 9:30 AM    Performed by: Satnam Ponce  "DONOVAN  Authorized by: Satnam Ponce DPM  Universal Protocol:  Consent: Verbal consent obtained.  Risks and benefits: risks, benefits and alternatives were discussed  Consent given by: patient  Time out: Immediately prior to procedure a \"time out\" was called to verify the correct patient, procedure, equipment, support staff and site/side marked as required.  Timeout called at: 1/27/2025 9:49 AM.  Patient understanding: patient states understanding of the procedure being performed  Patient identity confirmed: verbally with patient    Procedure details:     Laterality:  Right    Location:  Foot    Foot:  R footCast type:  Total contact        Supplies:  Cotton padding, sling, fiberglass and skin protective strip  Post-procedure details:     Patient tolerance of procedure:  Tolerated well, no immediate complications       Subjective:   HPI  The patient presents for evaluation and treatment of wound in right plantar foot.  Tolerated TCC well.  No significant pain.  He presents with regular shoe on left foot.      The following portions of the patient's history were reviewed and updated as appropriate:   Patient Active Problem List   Diagnosis    Hyperlipidemia    Type 2 diabetes mellitus with diabetic neuropathy (HCC)    Atrial fibrillation with RVR ([facility])    Right middle lobe pulmonary nodule    Type 2 diabetes mellitus with other skin ulcer (CODE) ([facility])    Essential hypertension    Benign prostatic hyperplasia with lower urinary tract symptoms    Microalbuminuria    Ambulatory dysfunction    S/P transmetatarsal amputation of foot, right ([facility])    Sleep disturbance    Anxiety    S/P R ALT flap to right foot    Gait abnormality    Vasomotor rhinitis    Corns and callosities    Tinea unguium    Puncture wound of left foot with foreign body, subsequent encounter    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 ([facility])    Personal history of COVID-19    Bronchitis    S/P " "amputation of lesser toe, left (HCC)    Ulcer of toe of left foot, limited to breakdown of skin (HCC)    Medication management    Subconjunctival hemorrhage of right eye     Past Medical History:   Diagnosis Date    Arthritis     Atrial fibrillation (HCC)     Diagnosed 11/2018    Benign prostate hyperplasia 04/2002    Cellulitis     right lower leg     Colon polyp 2006    Diabetes mellitus (HCC)     Hearing aid worn     bilat    Hearing loss     90% loss left ear and 40% right ear    History of clubfoot     \"since birth\"    History of pneumonia     History of TIA (transient ischemic attack) 04/25/2017 11/30/18 pt denies    Hyperlipidemia 5/15/2014    Hypertension     Infectious viral hepatitis     \"cant remember type\"    Irregular heart beat     Afib    Neuropathy     both feet    Osteomyelitis (HCC)     right great toe    Pneumonia     Right middle lobe pulmonary nodule 11/6/2018    Seasickness     Teeth missing     Type 2 diabetes mellitus with diabetic neuropathy (HCC) 11/6/2018    Wears glasses     reading    Wound, open     bottom of right foot     Past Surgical History:   Procedure Laterality Date    BUNIONECTOMY Right 12/4/2018    Procedure: 5TH METATARSAL BONE PARTIAL RESECTION, FULL THICKNESS DEBRIDEMENT OF DIABETIC ULCER;  Surgeon: Shala Flynn DPM;  [facility];  Service: Podiatry    CLOSURE DELAYED PRIMARY Left 10/30/2023    Procedure: CLOSURE DELAYED PRIMARY left foot;  Surgeon: Minh Urena DPM;  [facility];  Service: Podiatry    CLUB FOOT RELEASE Bilateral     COLONOSCOPY      COMPLEX WOUND CLOSURE TO EXTREMITY  4/27/2021    Procedure: COMPLEX WOUND CLOSURE TO EXTREMITY: RIGHT FOOT;  Surgeon: Niraj Bojorquez MD;  [facility];  Service: Plastics    EXTERNAL FIXATOR APPLICATION Right 2/12/2021    Procedure: Application multiplane external fixation;  Surgeon: Harry Zamudio DPM;  [facility];  Service: Podiatry    FOOT HARDWARE REMOVAL Right " 2/17/2021    Procedure: REMOVAL EXTERNAL FIXATOR;  Surgeon: Harry Zamudio DPM;  [facility];  Service: Podiatry    FOREIGN BODY REMOVAL Right 4/27/2021    Procedure: REMOVAL FOREIGN BODY EXTREMITY: RIGHT FOOT;  Surgeon: Niraj Bojorquez MD;  [facility];  Service: Plastics    INCISION AND DRAINAGE OF WOUND Right 2/17/2021    Procedure: INCISION AND DRAINAGE (I&D) EXTREMITY;  Surgeon: Harry Zamudio DPM;  [facility];  Service: Podiatry    INCISION AND DRAINAGE OF WOUND Left 10/26/2023    Procedure: INCISION AND DRAINAGE (I&D) EXTREMITY; WASHOUT; REMOVAL OF FOREIGN BODY;  Surgeon: Satnam Ponce DPM;  [facility];  Service: Podiatry    ORIF FOOT FRACTURE Right 3/4/2021    Procedure: VAC PLACEMENT; SCREW FIXATION RIGHT FOOT FUSION;  Surgeon: Harry Zamudio DPM;  [facility];  Service: Podiatry    NV AMPUTATION FOOT TRANSMETARSAL Right 2/22/2021    Procedure: AMPUTATION TRANSMETATARSAL (TMA), REMOVAL NAIL IM T2 ICF HARDWARE;  Surgeon: Harry Zamudio DPM;  [facility];  Service: Podiatry    NV AMPUTATION METATARSAL W/TOE SINGLE Left 11/29/2023    Procedure: LEFT FOURTH RAY RESECTION FOOT;  Surgeon: Harry Zamudio DPM;  [facility];  Service: Podiatry    NV ARTHRD MIDTARSL/TARS MLT/TRANSVRS W/OSTEOT Right 2/12/2021    Procedure: foot ARTHRODESIS/FUSION;  Surgeon: Harry Zamudio DPM;  [facility];  Service: Podiatry    NV DEBRIDEMENT BONE 1ST 20 SQ CM/< Right 12/2/2021    Procedure: DEBRIDEMENT OF TARSAL BONES WITH ANITBIODIC BEADS;  Surgeon: Harry Zamudio DPM;  [facility];  Service: Podiatry    NV LNGTH/SHRT TENDON LEG/ANKLE 1 TENDON SPX Right 2/12/2021    Procedure: LENGTHEN TIBIAL TENDON, TRANS HEEL CORD;  Surgeon: Harry Zamudio DPM;  [facility];  Service: Podiatry    NV MUSC MYOCUTANEOUS/FASCIOCUTANEOUS FLAP TRUNK Right 4/12/2021    Procedure: RECONSTRUCTION MICROSURGICAL W/ FREE FLAP;  Surgeon: Niraj BAINS  Jace Bojorquez MD;  [facility];  Service: Plastics    MT MUSC MYOCUTANEOUS/FASCIOCUTANEOUS FLAP TRUNK Right 4/12/2021    Procedure: TAKEBACK RECONSTRUCTION MICROSURGICAL W/ FREE FLAP;  Surgeon: Niraj Bojorquez MD;  [facility];  Service: Plastics    MT MUSC MYOCUTANEOUS/FASCIOCUTANEOUS FLAP TRUNK Right 4/13/2021    Procedure: RECONSTRUCTION MICROSURGICAL W/ FREE FLAP, RE EXPLORATION, MICRO VASCULAR REVISION;  Surgeon: Niraj Bojorquez MD;  [facility];  Service: Plastics    MT OSTC PRTL EXOSTC/CONDYLC METAR HEAD Right 12/29/2020    Procedure: EXCISION EXOSTOSIS;  Surgeon: Harry Zamudio DPM;  [facility];  Service: Podiatry    MT SECONDARY CLOSURE SURG WOUND/DEHSN XTNSV/COMP Right 12/29/2020    Procedure: PRIMARY DELAYED CLOSURE;  Surgeon: Harry Zamudio DPM;  [facility];  Service: Podiatry    TOE AMPUTATION Right     partial great toe    TONSILLECTOMY      VAC DRESSING APPLICATION Right 3/1/2021    Procedure: APPLICATION VAC DRESSING EXTREMITY;  Surgeon: Niraj Bojorquez MD;  [facility];  Service: Plastics    VASECTOMY  1992    WOUND DEBRIDEMENT Right 3/1/2021    Procedure: DEBRIDEMENT LOWER EXTREMITY (WASH OUT);  Surgeon: Niraj Bojorquez MD;  [facility];  Service: Plastics    WOUND DEBRIDEMENT Right 4/7/2021    Procedure: DEBRIDEMENT RIGHT FOOT;  Surgeon: Niraj Bojorquez MD;  [facility];  Service: Plastics    WOUND DEBRIDEMENT Right 4/12/2021    Procedure: DEBRIDEMENT LOWER EXTREMITY (WASH OUT);  Surgeon: Niraj Bojorquez MD;  [facility];  Service: Plastics    WOUND DEBRIDEMENT Right 4/27/2021    Procedure: DEBRIDEMENT LOWER EXTREMITY (WASH OUT): RIGHT FOOT;  Surgeon: Niraj Bojorquez MD;  [facility];  Service: Plastics     Family History   Problem Relation Age of Onset    Diabetes Father         mellitus      Social History     Socioeconomic History     Marital status: /Civil Union     Spouse name: None    Number of children: None    Years of education: None    Highest education level: None   Occupational History    None   Tobacco Use    Smoking status: Former     Current packs/day: 0.00     Types: Cigarettes     Quit date: 1988     Years since quittin.2     Passive exposure: Past    Smokeless tobacco: Former    Tobacco comments:     exposure to passive smoke   Vaping Use    Vaping status: Never Used   Substance and Sexual Activity    Alcohol use: Yes     Comment: occasionally    Drug use: Not Currently    Sexual activity: Yes     Partners: Female   Other Topics Concern    None   Social History Narrative    None     Social Drivers of Health     Financial Resource Strain: Not on file   Food Insecurity: No Food Insecurity (2024)    Nursing - Inadequate Food Risk Classification     Worried About Running Out of Food in the Last Year: Never true     Ran Out of Food in the Last Year: Never true     Ran Out of Food in the Last Year: Not on file   Transportation Needs: No Transportation Needs (2024)    PRAPARE - Transportation     Lack of Transportation (Medical): No     Lack of Transportation (Non-Medical): No   Physical Activity: Not on file   Stress: Not on file   Social Connections: Not on file   Intimate Partner Violence: Not on file   Housing Stability: Low Risk  (2024)    Housing Stability Vital Sign     Unable to Pay for Housing in the Last Year: No     Number of Times Moved in the Last Year: 0     Homeless in the Last Year: No        Current Outpatient Medications:     acetaminophen (Mapap Arthritis Pain) 650 mg CR tablet, Take 1 tablet (650 mg total) by mouth 2 (two) times a day, Disp: 60 tablet, Rfl: 1    atorvastatin (LIPITOR) 40 mg tablet, TAKE 1 TABLET (40 MG TOTAL) BY MOUTH DAILY, Disp: 90 tablet, Rfl: 2    Continuous Glucose Sensor (FreeStyle Taylor 3 Sensor) MISC, Use 1 each every 14 (fourteen) days, Disp: 1 each, Rfl: 0     diltiazem (CARDIZEM) 120 MG tablet, TAKE 1 TABLET (120 MG TOTAL) BY MOUTH TWICE A DAY, Disp: 180 tablet, Rfl: 1    Eliquis 5 MG, Take 1 tablet (5 mg total) by mouth 2 (two) times a day, Disp: 60 tablet, Rfl: 11    escitalopram (LEXAPRO) 5 mg tablet, Take 1 tablet (5 mg total) by mouth daily, Disp: 90 tablet, Rfl: 0    glucose blood (Accu-Chek Guide) test strip, USE DAILY, Disp: 100 strip, Rfl: 2    Insulin Glargine Solostar (Lantus SoloStar) 100 UNIT/ML SOPN, Inject 0.35 mL (35 Units total) under the skin daily at bedtime, Disp: 45 mL, Rfl: 1    insulin lispro (HumaLOG) 100 units/mL injection pen, Inject up to 20 Units under the skin 3 (three) times a day with meals, Disp: 15 mL, Rfl: 5    Insulin Pen Needle (Easy Comfort Pen Needles) 33G X 5 MM MISC, USE AS DIRECTED, Disp: 100 each, Rfl: 4    Lancets (onetouch ultrasoft) lancets, Use as instructed, Disp: 100 each, Rfl: 3    Lancets (onetouch ultrasoft) lancets, Use daily, Disp: 100 each, Rfl: 3    lisinopril (ZESTRIL) 40 mg tablet, Take 1 tablet (40 mg total) by mouth daily, Disp: 90 tablet, Rfl: 3    metFORMIN (GLUCOPHAGE-XR) 750 mg 24 hr tablet, Take 1 tablet (750 mg total) by mouth 2 (two) times a day with meals, Disp: 180 tablet, Rfl: 3    metoprolol succinate (TOPROL-XL) 100 mg 24 hr tablet, TAKE 1 TABLET (100 MG TOTAL) BY MOUTH 2 (TWO) TIMES A DAY, Disp: 180 tablet, Rfl: 0    mupirocin (BACTROBAN) 2 % ointment, Apply topically 3 (three) times a day, Disp: 15 g, Rfl: 0    tamsulosin (FLOMAX) 0.4 mg, Take 1 capsule (0.4 mg total) by mouth daily with dinner, Disp: 90 capsule, Rfl: 2    Review of Systems   Constitutional:  Negative for chills and fever.   Respiratory:  Negative for cough and shortness of breath.    Cardiovascular:  Negative for chest pain.   Gastrointestinal:  Negative for nausea and vomiting.   Skin:  Positive for wound.   Neurological:  Positive for numbness. Negative for weakness.       Objective:  /70   Pulse 76   Temp 97.9 °F (36.6  °C)   Resp 18   Pain Score: 0-No pain     Physical Exam  Vitals and nursing note reviewed.   Constitutional:       General: He is not in acute distress.     Appearance: He is not toxic-appearing or diaphoretic.   Cardiovascular:      Rate and Rhythm: Normal rate and regular rhythm.      Pulses:           Dorsalis pedis pulses are 1+ on the right side.        Posterior tibial pulses are 0 on the right side.   Pulmonary:      Effort: Pulmonary effort is normal. No respiratory distress.   Musculoskeletal:         General: Deformity present. No signs of injury.      Right foot: No foot drop.      Comments: S/P partial foot amputation right.   Skin:     General: Skin is warm.      Capillary Refill: Capillary refill takes less than 2 seconds.      Coloration: Skin is not cyanotic or mottled.      Findings: Wound present. No abscess.      Nails: There is no clubbing.      Comments: DFU in right plantar foot is almost healed.  No significant improvement in ulcer of left 2nd toe.  No redness or swelling.  No exposed bone.  No signs of infection bilaterally.  See wound assessment and photo.   Neurological:      General: No focal deficit present.      Mental Status: He is alert and oriented to person, place, and time.      Cranial Nerves: No cranial nerve deficit.      Sensory: Sensory deficit present.      Motor: No weakness.      Coordination: Coordination normal.   Psychiatric:         Mood and Affect: Mood normal.         Behavior: Behavior normal.         Thought Content: Thought content normal.         Judgment: Judgment normal.           Wound 12/16/24 Diabetic Ulcer Foot Right;Plantar (Active)   Enter Parks score: Parks Grade 1: Partial or full-thickness ulcer (superficial) 01/27/25 0938   Wound Image Images linked 01/27/25 0938   Wound Description Unable to assess 01/27/25 0938   Lavern-wound Assessment Intact;Callus 01/27/25 0938   Wound Length (cm) 0.1 cm 01/27/25 0938   Wound Width (cm) 0.1 cm 01/27/25 0938    Wound Depth (cm) 0.1 cm 01/27/25 0938   Wound Surface Area (cm^2) 0.01 cm^2 01/27/25 0938   Wound Volume (cm^3) 0.001 cm^3 01/27/25 0938   Calculated Wound Volume (cm^3) 0 cm^3 01/27/25 0938   Change in Wound Size % 100 01/27/25 0938   Drainage Amount Small 01/27/25 0938   Drainage Description Serosanguineous 01/27/25 0938   Non-staged Wound Description Full thickness 01/27/25 0938       Wound 12/30/24 Diabetic Ulcer Toe D3, third Anterior;Left (Active)   Enter Parks score: Parks Grade 1: Partial or full-thickness ulcer (superficial) 01/27/25 0941   Wound Image Images linked 01/27/25 0941   Wound Description Pink;Yellow 01/27/25 0941   Lavern-wound Assessment Intact 01/27/25 0941   Wound Length (cm) 0.5 cm 01/27/25 0941   Wound Width (cm) 0.8 cm 01/27/25 0941   Wound Depth (cm) 0.1 cm 01/27/25 0941   Wound Surface Area (cm^2) 0.4 cm^2 01/27/25 0941   Wound Volume (cm^3) 0.04 cm^3 01/27/25 0941   Calculated Wound Volume (cm^3) 0.04 cm^3 01/27/25 0941   Change in Wound Size % 33.33 01/27/25 0941   Drainage Amount Small 01/27/25 0941   Drainage Description Serosanguineous 01/27/25 0941   Non-staged Wound Description Full thickness 01/27/25 0941                       Lab Results   Component Value Date    HGBA1C 7.7 (H) 01/16/2025       Wound Instructions:  Orders Placed This Encounter   Procedures    Splint, Casting, Strapping     This order was created via procedure documentation    Wound cleansing and dressings Diabetic Ulcer Anterior;Left Toe D3, third     Left third toe wound:  Apply purachol AG to the wound.  Cover with dry gauze.  Secure with rolled gauze and tape.   Change dressing three times a week.      Wear surgical shoe to left foot at all times. Remove for sleeping.       Follow up at the wound center in one week.    Obtain x-ray of Right foot prior to next visit     Standing Status:   Future     Expected Date:   1/27/2025     Expiration Date:   2/3/2025    Wound cleansing and dressings Diabetic Ulcer  Right;Plantar Foot     Shower no. DO NOT get cast wet.          Right foot wound:   Apply skin prep under felt pad.  1/4 inch felt pad to heel  Apply acticoat 7 to the wound.     Cover with alginate and convamax.  Secure with kerlix and tape.     Change dressing weekly at the wound center.          Limit your walking for 24 hours until the cast sets.   Do not walk without the boot or the cast will crack.      Do not put any objects in the cast to scratch.          Follow up at the wound center in one week.     Watch for signs of infection these include a fever of 100.4°F (38°C) or higher, chills  Signs of wound infection include increasing swelling, redness, warmth, pain around the wound. Foul smell coming from wound  Go to the closest Emergency Room with signs of infection to be evaluated.     Standing Status:   Future     Expected Date:   1/27/2025     Expiration Date:   2/3/2025    Wound Procedure Treatment Diabetic Ulcer Right;Plantar Foot     This order was created via procedure documentation    Wound Procedure Treatment Diabetic Ulcer Anterior;Left Toe D3, third     This order was created via procedure documentation    Debridement Diabetic Ulcer Anterior;Left Toe D3, third     This order was created via procedure documentation    XR foot 3+ vw left     Standing Status:   Future     Expected Date:   1/27/2025     Expiration Date:   2/27/2025     Reason for Exam::   ulcer left 3rd toe             Satnam Ponce DPM

## 2025-01-27 NOTE — PATIENT INSTRUCTIONS
Orders Placed This Encounter   Procedures    Splint, Casting, Strapping     This order was created via procedure documentation    Wound cleansing and dressings Diabetic Ulcer Anterior;Left Toe D3, third     Left third toe wound:  Apply purachol AG to the wound.  Cover with dry gauze.  Secure with rolled gauze and tape.   Change dressing three times a week.      Wear surgical shoe to left foot at all times. Remove for sleeping.       Follow up at the wound center in one week.    Obtain x-ray of Right foot prior to next visit     Standing Status:   Future     Expected Date:   1/27/2025     Expiration Date:   2/3/2025    Wound cleansing and dressings Diabetic Ulcer Right;Plantar Foot     Shower no. DO NOT get cast wet.          Right foot wound:   Apply skin prep under felt pad.  1/4 inch felt pad to heel  Apply acticoat 7 to the wound.     Cover with alginate and convamax.  Secure with kerlix and tape.     Change dressing weekly at the wound center.          Limit your walking for 24 hours until the cast sets.   Do not walk without the boot or the cast will crack.      Do not put any objects in the cast to scratch.          Follow up at the wound center in one week.     Watch for signs of infection these include a fever of 100.4°F (38°C) or higher, chills  Signs of wound infection include increasing swelling, redness, warmth, pain around the wound. Foul smell coming from wound  Go to the closest Emergency Room with signs of infection to be evaluated.     Standing Status:   Future     Expected Date:   1/27/2025     Expiration Date:   2/3/2025

## 2025-02-03 ENCOUNTER — OFFICE VISIT (OUTPATIENT)
Dept: WOUND CARE | Facility: CLINIC | Age: 75
End: 2025-02-03
Payer: COMMERCIAL

## 2025-02-03 ENCOUNTER — OFFICE VISIT (OUTPATIENT)
Dept: FAMILY MEDICINE CLINIC | Facility: CLINIC | Age: 75
End: 2025-02-03
Payer: COMMERCIAL

## 2025-02-03 VITALS
OXYGEN SATURATION: 97 % | WEIGHT: 246.2 LBS | BODY MASS INDEX: 29.97 KG/M2 | TEMPERATURE: 97.2 F | SYSTOLIC BLOOD PRESSURE: 122 MMHG | HEART RATE: 81 BPM | DIASTOLIC BLOOD PRESSURE: 64 MMHG

## 2025-02-03 VITALS
SYSTOLIC BLOOD PRESSURE: 128 MMHG | RESPIRATION RATE: 18 BRPM | TEMPERATURE: 97.6 F | DIASTOLIC BLOOD PRESSURE: 72 MMHG | HEART RATE: 80 BPM

## 2025-02-03 DIAGNOSIS — E78.5 HYPERLIPIDEMIA, UNSPECIFIED HYPERLIPIDEMIA TYPE: Chronic | ICD-10-CM

## 2025-02-03 DIAGNOSIS — F32.A ANXIETY AND DEPRESSION: Primary | ICD-10-CM

## 2025-02-03 DIAGNOSIS — N40.1 BENIGN PROSTATIC HYPERPLASIA WITH WEAK URINARY STREAM: Chronic | ICD-10-CM

## 2025-02-03 DIAGNOSIS — F41.9 ANXIETY AND DEPRESSION: Primary | ICD-10-CM

## 2025-02-03 DIAGNOSIS — L97.412 ULCER OF HEEL AND MIDFOOT, RIGHT, WITH FAT LAYER EXPOSED (HCC): Primary | ICD-10-CM

## 2025-02-03 DIAGNOSIS — I10 ESSENTIAL HYPERTENSION: Chronic | ICD-10-CM

## 2025-02-03 DIAGNOSIS — E11.40 TYPE 2 DIABETES MELLITUS WITH DIABETIC NEUROPATHY, UNSPECIFIED WHETHER LONG TERM INSULIN USE (HCC): ICD-10-CM

## 2025-02-03 DIAGNOSIS — R39.12 BENIGN PROSTATIC HYPERPLASIA WITH WEAK URINARY STREAM: Chronic | ICD-10-CM

## 2025-02-03 DIAGNOSIS — E11.40 TYPE 2 DIABETES MELLITUS WITH DIABETIC NEUROPATHY, WITHOUT LONG-TERM CURRENT USE OF INSULIN (HCC): ICD-10-CM

## 2025-02-03 DIAGNOSIS — I48.91 ATRIAL FIBRILLATION, UNSPECIFIED TYPE (HCC): ICD-10-CM

## 2025-02-03 DIAGNOSIS — L97.521 ULCER OF TOE OF LEFT FOOT, LIMITED TO BREAKDOWN OF SKIN (HCC): ICD-10-CM

## 2025-02-03 DIAGNOSIS — L03.116 CELLULITIS OF LEFT LOWER LEG: ICD-10-CM

## 2025-02-03 PROCEDURE — G2211 COMPLEX E/M VISIT ADD ON: HCPCS | Performed by: FAMILY MEDICINE

## 2025-02-03 PROCEDURE — 99214 OFFICE O/P EST MOD 30 MIN: CPT | Performed by: FAMILY MEDICINE

## 2025-02-03 PROCEDURE — 99213 OFFICE O/P EST LOW 20 MIN: CPT | Performed by: PODIATRIST

## 2025-02-03 PROCEDURE — 99214 OFFICE O/P EST MOD 30 MIN: CPT | Performed by: PODIATRIST

## 2025-02-03 RX ORDER — DILTIAZEM HYDROCHLORIDE 120 MG/1
120 TABLET, FILM COATED ORAL EVERY 12 HOURS SCHEDULED
Qty: 180 TABLET | Refills: 1 | Status: SHIPPED | OUTPATIENT
Start: 2025-02-03

## 2025-02-03 RX ORDER — APIXABAN 5 MG/1
5 TABLET, FILM COATED ORAL 2 TIMES DAILY
Qty: 60 TABLET | Refills: 11 | Status: SHIPPED | OUTPATIENT
Start: 2025-02-03

## 2025-02-03 RX ORDER — ESCITALOPRAM OXALATE 10 MG/1
10 TABLET ORAL DAILY
Qty: 90 TABLET | Refills: 0 | Status: SHIPPED | OUTPATIENT
Start: 2025-02-03

## 2025-02-03 RX ORDER — TAMSULOSIN HYDROCHLORIDE 0.4 MG/1
0.4 CAPSULE ORAL
Qty: 90 CAPSULE | Refills: 1 | Status: SHIPPED | OUTPATIENT
Start: 2025-02-03

## 2025-02-03 RX ORDER — ATORVASTATIN CALCIUM 40 MG/1
40 TABLET, FILM COATED ORAL DAILY
Qty: 90 TABLET | Refills: 1 | Status: SHIPPED | OUTPATIENT
Start: 2025-02-03

## 2025-02-03 RX ORDER — LISINOPRIL 40 MG/1
40 TABLET ORAL DAILY
Qty: 90 TABLET | Refills: 1 | Status: SHIPPED | OUTPATIENT
Start: 2025-02-03

## 2025-02-03 RX ORDER — METFORMIN HYDROCHLORIDE 750 MG/1
750 TABLET, EXTENDED RELEASE ORAL 2 TIMES DAILY WITH MEALS
Qty: 180 TABLET | Refills: 1 | Status: SHIPPED | OUTPATIENT
Start: 2025-02-03

## 2025-02-03 NOTE — PROGRESS NOTES
Wound Procedure Treatment Diabetic Ulcer Right;Plantar Foot    Performed by: Nora Owen RN  Authorized by: Satnam Ponce DPM    Associated wounds:   Wound 12/16/24 Diabetic Ulcer Foot Right;Plantar  Wound cleansed with:  Soap and water  Applied to periwound:  Moisture lotion  Applied secondary dressing:  Gauze  Dressing secured with:  Kacy and Tape  Offloading device appllied:  Felt padding 1/4 inch and Other  Comments:  Bulky dressing applied to R foot (2 rolls cast padding, 1 roll kerlix, 1 roll coban  Tubifast yellow over all  Offloading shoe applied

## 2025-02-03 NOTE — PROGRESS NOTES
Patient ID: Won Gaitan Jr. is a 74 y.o. male Date of Birth 1950       Chief Complaint   Patient presents with    Follow Up Wound Care Visit     Right plantar foot and left 3rd toe wounds. Arrived with TCC on R foot and dressing on left. R plantar foot wound is without drainage and is healed today.       Allergies:  Simvastatin and Itraconazole    Assessments:      Diagnosis ICD-10-CM Associated Orders   1. Ulcer of heel and midfoot, right, with fat layer exposed ([facility])  L97.412 Wound cleansing and dressings     Wound Procedure Treatment Diabetic Ulcer Right;Plantar Foot      2. Ulcer of toe of left foot, limited to breakdown of skin ([facility])  L97.521 MRI foot/forefoot toes left wo contrast     Wound cleansing and dressings     Wound Procedure Treatment Diabetic Ulcer Anterior;Left Toe D3, third      3. Type 2 diabetes mellitus with diabetic neuropathy, unspecified whether long term insulin use ([facility])  E11.40 MRI foot/forefoot toes left wo contrast     Wound cleansing and dressings     Wound Procedure Treatment Diabetic Ulcer Anterior;Left Toe D3, third               Plan:   Reviewed medical records.  X-ray of left foot reviewed/ discussed.  No obvious infection from X-ray.  Patient was counseled and educated on the condition and the diagnosis.    2.  The diagnosis, treatment options and prognosis were discussed with the patient.    3.  Right foot ulcer is healed.  Transition to football dressing for protection.  Left 2nd toe wound does not improve well.  Will check MRI to rule out osteomyelitis.  Pt to wear surgical shoe on left foot at all times.    4. Stressed on patient compliance about proper off-loading, and staying off of foot to reduce the risk of infection, non-healing, and limb loss.    5. Patient will return in 1 week for re-evaluation.         Imaging: I have personally reviewed pertinent films in PACS  Labs, pathology, and Other Studies: I have personally reviewed pertinent reports.   "      Procedures     Subjective:   HPI  The patient presents for evaluation and treatment of wounds in both feet.  Tolerated TCC well.  No significant pain.  Left 3rd toe wound looks stable.  He presents with regular shoe on left foot.      The following portions of the patient's history were reviewed and updated as appropriate:   Patient Active Problem List   Diagnosis    Hyperlipidemia    Type 2 diabetes mellitus with diabetic neuropathy (HCC)    Atrial fibrillation with RVR ([facility])    Right middle lobe pulmonary nodule    Type 2 diabetes mellitus with other skin ulcer (CODE) ([facility])    Essential hypertension    Benign prostatic hyperplasia with lower urinary tract symptoms    Microalbuminuria    Ambulatory dysfunction    S/P transmetatarsal amputation of foot, right ([facility])    Sleep disturbance    Anxiety    S/P R ALT flap to right foot    Gait abnormality    Vasomotor rhinitis    Corns and callosities    Tinea unguium    Puncture wound of left foot with foreign body, subsequent encounter    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 ([facility])    Personal history of COVID-19    Bronchitis    S/P amputation of lesser toe, left ([facility])    Ulcer of toe of left foot, limited to breakdown of skin ([facility])    Medication management    Subconjunctival hemorrhage of right eye     Past Medical History:   Diagnosis Date    Arthritis     Atrial fibrillation (HCC)     Diagnosed 11/2018    Benign prostate hyperplasia 04/2002    Cellulitis     right lower leg     Colon polyp 2006    Diabetes mellitus (HCC)     Hearing aid worn     bilat    Hearing loss     90% loss left ear and 40% right ear    History of clubfoot     \"since birth\"    History of pneumonia     History of TIA (transient ischemic attack) 04/25/2017 11/30/18 pt denies    Hyperlipidemia 5/15/2014    Hypertension     Infectious viral hepatitis     \"cant remember type\"    Irregular heart beat     Afib    Neuropathy     " both feet    Osteomyelitis (HCC)     right great toe    Pneumonia     Right middle lobe pulmonary nodule 11/6/2018    Seasickness     Teeth missing     Type 2 diabetes mellitus with diabetic neuropathy (HCC) 11/6/2018    Wears glasses     reading    Wound, open     bottom of right foot     Past Surgical History:   Procedure Laterality Date    BUNIONECTOMY Right 12/4/2018    Procedure: 5TH METATARSAL BONE PARTIAL RESECTION, FULL THICKNESS DEBRIDEMENT OF DIABETIC ULCER;  Surgeon: Shala Flynn DPM;  [facility];  Service: Podiatry    CLOSURE DELAYED PRIMARY Left 10/30/2023    Procedure: CLOSURE DELAYED PRIMARY left foot;  Surgeon: Minh Urena DPM;  [facility];  Service: Podiatry    CLUB FOOT RELEASE Bilateral     COLONOSCOPY      COMPLEX WOUND CLOSURE TO EXTREMITY  4/27/2021    Procedure: COMPLEX WOUND CLOSURE TO EXTREMITY: RIGHT FOOT;  Surgeon: Niraj Bojorquez MD;  [facility];  Service: Plastics    EXTERNAL FIXATOR APPLICATION Right 2/12/2021    Procedure: Application multiplane external fixation;  Surgeon: Harry Zamudio DPM;  [facility];  Service: Podiatry    FOOT HARDWARE REMOVAL Right 2/17/2021    Procedure: REMOVAL EXTERNAL FIXATOR;  Surgeon: Harry Zamudio DPM;  [facility];  Service: Podiatry    FOREIGN BODY REMOVAL Right 4/27/2021    Procedure: REMOVAL FOREIGN BODY EXTREMITY: RIGHT FOOT;  Surgeon: Niraj Bojorquez MD;  [facility];  Service: Plastics    INCISION AND DRAINAGE OF WOUND Right 2/17/2021    Procedure: INCISION AND DRAINAGE (I&D) EXTREMITY;  Surgeon: Harry Zamudio DPM;  [facility];  Service: Podiatry    INCISION AND DRAINAGE OF WOUND Left 10/26/2023    Procedure: INCISION AND DRAINAGE (I&D) EXTREMITY; WASHOUT; REMOVAL OF FOREIGN BODY;  Surgeon: Satnam Ponce DPM;  [facility];  Service: Podiatry    ORIF FOOT FRACTURE Right 3/4/2021    Procedure: VAC PLACEMENT; SCREW FIXATION RIGHT FOOT FUSION;   Surgeon: Harry Zamudio DPM;  [facility];  Service: Podiatry    WI AMPUTATION FOOT TRANSMETARSAL Right 2/22/2021    Procedure: AMPUTATION TRANSMETATARSAL (TMA), REMOVAL NAIL IM T2 ICF HARDWARE;  Surgeon: Harry Zamudio DPM;  [facility];  Service: Podiatry    WI AMPUTATION METATARSAL W/TOE SINGLE Left 11/29/2023    Procedure: LEFT FOURTH RAY RESECTION FOOT;  Surgeon: Harry Zamudio DPM;  [facility];  Service: Podiatry    WI ARTHRD MIDTARSL/TARS MLT/TRANSVRS W/OSTEOT Right 2/12/2021    Procedure: foot ARTHRODESIS/FUSION;  Surgeon: Harry Zamudio DPM;  [facility];  Service: Podiatry    WI DEBRIDEMENT BONE 1ST 20 SQ CM/< Right 12/2/2021    Procedure: DEBRIDEMENT OF TARSAL BONES WITH ANITBIODIC BEADS;  Surgeon: Harry Zamudio DPM;  [facility];  Service: Podiatry    WI LNGTH/SHRT TENDON LEG/ANKLE 1 TENDON SPX Right 2/12/2021    Procedure: LENGTHEN TIBIAL TENDON, TRANS HEEL CORD;  Surgeon: Harry Zamudio DPM;  [facility];  Service: Podiatry    WI MUSC MYOCUTANEOUS/FASCIOCUTANEOUS FLAP TRUNK Right 4/12/2021    Procedure: RECONSTRUCTION MICROSURGICAL W/ FREE FLAP;  Surgeon: Niraj Bojorquez MD;  [facility];  Service: Plastics    WI MUSC MYOCUTANEOUS/FASCIOCUTANEOUS FLAP TRUNK Right 4/12/2021    Procedure: TAKEBACK RECONSTRUCTION MICROSURGICAL W/ FREE FLAP;  Surgeon: Niraj Bojorquez MD;  [facility];  Service: Plastics    WI MUSC MYOCUTANEOUS/FASCIOCUTANEOUS FLAP TRUNK Right 4/13/2021    Procedure: RECONSTRUCTION MICROSURGICAL W/ FREE FLAP, RE EXPLORATION, MICRO VASCULAR REVISION;  Surgeon: Niraj Bojorquez MD;  [facility];  Service: Plastics    WI OSTC PRTL EXOSTC/CONDYLC METAR HEAD Right 12/29/2020    Procedure: EXCISION EXOSTOSIS;  Surgeon: Harry Zamudio DPM;  [facility];  Service: Podiatry    WI SECONDARY CLOSURE SURG WOUND/DEHSN XTNSV/COMP Right 12/29/2020    Procedure: PRIMARY  DELAYED CLOSURE;  Surgeon: Harry Zamudio DPM;  [facility];  Service: Podiatry    TOE AMPUTATION Right     partial great toe    TONSILLECTOMY      VAC DRESSING APPLICATION Right 3/1/2021    Procedure: APPLICATION VAC DRESSING EXTREMITY;  Surgeon: Niraj Bojorquez MD;  [facility];  Service: Plastics    VASECTOMY  1992    WOUND DEBRIDEMENT Right 3/1/2021    Procedure: DEBRIDEMENT LOWER EXTREMITY (WASH OUT);  Surgeon: Niraj Bojorquez MD;  [facility];  Service: Plastics    WOUND DEBRIDEMENT Right 2021    Procedure: DEBRIDEMENT RIGHT FOOT;  Surgeon: Niraj Bojorquez MD;  [facility];  Service: Plastics    WOUND DEBRIDEMENT Right 2021    Procedure: DEBRIDEMENT LOWER EXTREMITY (WASH OUT);  Surgeon: Niraj Bojorquez MD;  [facility];  Service: Plastics    WOUND DEBRIDEMENT Right 2021    Procedure: DEBRIDEMENT LOWER EXTREMITY (WASH OUT): RIGHT FOOT;  Surgeon: Niraj Bojorquez MD;  [facility];  Service: Plastics     Family History   Problem Relation Age of Onset    Diabetes Father         mellitus      Social History     Socioeconomic History    Marital status: /Civil Union     Spouse name: None    Number of children: None    Years of education: None    Highest education level: None   Occupational History    None   Tobacco Use    Smoking status: Former     Current packs/day: 0.00     Types: Cigarettes     Quit date: 1988     Years since quittin.2     Passive exposure: Past    Smokeless tobacco: Former    Tobacco comments:     exposure to passive smoke   Vaping Use    Vaping status: Never Used   Substance and Sexual Activity    Alcohol use: Yes     Comment: occasionally    Drug use: Not Currently    Sexual activity: Yes     Partners: Female   Other Topics Concern    None   Social History Narrative    None     Social Drivers of Health     Financial Resource Strain: Not on file   Food Insecurity: No  Food Insecurity (7/12/2024)    Nursing - Inadequate Food Risk Classification     Worried About Running Out of Food in the Last Year: Never true     Ran Out of Food in the Last Year: Never true     Ran Out of Food in the Last Year: Not on file   Transportation Needs: No Transportation Needs (7/12/2024)    PRAPARE - Transportation     Lack of Transportation (Medical): No     Lack of Transportation (Non-Medical): No   Physical Activity: Not on file   Stress: Not on file   Social Connections: Not on file   Intimate Partner Violence: Not on file   Housing Stability: Low Risk  (7/12/2024)    Housing Stability Vital Sign     Unable to Pay for Housing in the Last Year: No     Number of Times Moved in the Last Year: 0     Homeless in the Last Year: No        Current Outpatient Medications:     acetaminophen (Mapap Arthritis Pain) 650 mg CR tablet, Take 1 tablet (650 mg total) by mouth 2 (two) times a day, Disp: 60 tablet, Rfl: 1    atorvastatin (LIPITOR) 40 mg tablet, Take 1 tablet (40 mg total) by mouth daily, Disp: 90 tablet, Rfl: 1    Continuous Glucose Sensor (FreeStyle Taylor 3 Sensor) MISC, Use 1 each every 14 (fourteen) days, Disp: 1 each, Rfl: 0    diltiazem (CARDIZEM) 120 MG tablet, Take 1 tablet (120 mg total) by mouth every 12 (twelve) hours, Disp: 180 tablet, Rfl: 1    Eliquis 5 MG, Take 1 tablet (5 mg total) by mouth 2 (two) times a day, Disp: 60 tablet, Rfl: 11    escitalopram (LEXAPRO) 10 mg tablet, Take 1 tablet (10 mg total) by mouth daily, Disp: 90 tablet, Rfl: 0    glucose blood (Accu-Chek Guide) test strip, USE DAILY, Disp: 100 strip, Rfl: 2    Insulin Glargine Solostar (Lantus SoloStar) 100 UNIT/ML SOPN, Inject 0.35 mL (35 Units total) under the skin daily at bedtime, Disp: 45 mL, Rfl: 1    insulin lispro (HumaLOG) 100 units/mL injection pen, Inject up to 20 Units under the skin 3 (three) times a day with meals, Disp: 15 mL, Rfl: 5    Insulin Pen Needle (Easy Comfort Pen Needles) 33G X 5 MM MISC, USE AS  DIRECTED, Disp: 100 each, Rfl: 4    Lancets (onetouch ultrasoft) lancets, Use as instructed, Disp: 100 each, Rfl: 3    Lancets (onetouch ultrasoft) lancets, Use daily, Disp: 100 each, Rfl: 3    lisinopril (ZESTRIL) 40 mg tablet, Take 1 tablet (40 mg total) by mouth daily, Disp: 90 tablet, Rfl: 1    metFORMIN (GLUCOPHAGE-XR) 750 mg 24 hr tablet, Take 1 tablet (750 mg total) by mouth 2 (two) times a day with meals, Disp: 180 tablet, Rfl: 1    metoprolol succinate (TOPROL-XL) 100 mg 24 hr tablet, TAKE 1 TABLET (100 MG TOTAL) BY MOUTH 2 (TWO) TIMES A DAY, Disp: 180 tablet, Rfl: 0    mupirocin (BACTROBAN) 2 % ointment, Apply topically 3 (three) times a day, Disp: 15 g, Rfl: 0    tamsulosin (FLOMAX) 0.4 mg, Take 1 capsule (0.4 mg total) by mouth daily with dinner, Disp: 90 capsule, Rfl: 1    Review of Systems   Constitutional:  Negative for chills and fever.   Respiratory:  Negative for cough and shortness of breath.    Cardiovascular:  Negative for chest pain.   Gastrointestinal:  Negative for nausea and vomiting.   Skin:  Positive for wound.   Neurological:  Positive for numbness. Negative for weakness.       Objective:  /72   Pulse 80   Temp 97.6 °F (36.4 °C)   Resp 18   Pain Score: 0-No pain     Physical Exam  Vitals and nursing note reviewed.   Constitutional:       General: He is not in acute distress.     Appearance: He is not toxic-appearing or diaphoretic.   Cardiovascular:      Rate and Rhythm: Normal rate and regular rhythm.      Pulses:           Dorsalis pedis pulses are 1+ on the right side.        Posterior tibial pulses are 0 on the right side.   Pulmonary:      Effort: Pulmonary effort is normal. No respiratory distress.   Musculoskeletal:         General: Deformity present. No signs of injury.      Right foot: No foot drop.      Comments: S/P partial foot amputation right.   Skin:     General: Skin is warm.      Capillary Refill: Capillary refill takes less than 2 seconds.      Coloration: Skin  is not cyanotic or mottled.      Findings: Wound present. No abscess.      Nails: There is no clubbing.      Comments: DFU in right plantar foot is healed.  No significant improvement in ulcer of left 2nd toe.  No significant redness or swelling.  Minimal drainage.  No obvious exposed bone.  No signs of active infection. See wound assessment and photo.   Neurological:      General: No focal deficit present.      Mental Status: He is alert and oriented to person, place, and time.      Cranial Nerves: No cranial nerve deficit.      Sensory: Sensory deficit present.      Motor: No weakness.      Coordination: Coordination normal.   Psychiatric:         Mood and Affect: Mood normal.         Behavior: Behavior normal.         Thought Content: Thought content normal.         Judgment: Judgment normal.                                    Lab Results   Component Value Date    HGBA1C 7.7 (H) 01/16/2025       Wound Instructions:  Orders Placed This Encounter   Procedures    Wound cleansing and dressings     Left third toe wound:  STOP purachol AG   Apply small amount of Woun'Dres to wound bed  Cover with dry gauze.  Secure with rolled gauze and tape.   Change dressing every other day      Right foot wound:   Applly bulky dressing to R foot\  Tubifast over all     Change dressing weekly at the wound center.   NO DRIVING  Limit your walking for the next week   Do not walk without cast or other shoe        Follow up at the wound center in one week.     Standing Status:   Future     Expiration Date:   2/10/2025    Wound Procedure Treatment Diabetic Ulcer Right;Plantar Foot     This order was created via procedure documentation    Wound Procedure Treatment Diabetic Ulcer Anterior;Left Toe D3, third     This order was created via procedure documentation    MRI foot/forefoot toes left wo contrast     Standing Status:   Future     Expected Date:   2/3/2025     Expiration Date:   2/3/2029     Scheduling Instructions:      There is no  "preparation for this test. Please leave your jewelry and valuables at home, wedding rings are the exception. All patients will be required to change into a hospital gown and pants.  Street clothes are not permitted in the MRI.  Magnetic nail polish must be removed prior to arrival for your test. Please bring your insurance cards, a form of photo ID and a list of your medications with you. Arrive 15 minutes prior to your appointment time in order to register. Please bring any prior CT or MRI studies of this area that were not performed at a [facility].            To schedule this appointment, please contact Central Scheduling at [phone].            Prior to your appointment, please make sure you complete the MRI Screening Form when you e-Check in for your appointment. This will be available starting 7 days before your appointment in [employer] [facility]. You may receive an e-mail with an activation code if you do not have a [employer] account. If you do not have access to a device, we will complete your screening at your appointment.     Reason for Exam:   non-healing wound left 3rd toe to rule out osteomyelitis     For OP exams needed \"URGENT\", choose the appropriate timeframe below and call Central Scheduling at [phone]. No need to speak with a Radiologist.:   URGENT- within 1 week     What is the patient's sedation requirement?:   No Sedation     Does the patient need medication for Claustrophobia? If yes, order medication at this point.:   No     Does the patient wear a life vest, have an implanted cardiac device, a stimulation device, a sleep apnea stimulator, or a breast tissue expansion device?:   Unknown     Release to patient through [employer]:   Immediate             Satnam Ponce DPM    "

## 2025-02-03 NOTE — PATIENT INSTRUCTIONS
"Orders Placed This Encounter   Procedures    Wound cleansing and dressings     Left third toe wound:  STOP purachol AG   Apply small amount of Woun'Dres to wound bed  Cover with dry gauze.  Secure with rolled gauze and tape.   Change dressing every other day      Right foot wound:   Applly bulky dressing to R foot\  Tubifast over all     Change dressing weekly at the wound center.   NO DRIVING  Limit your walking for the next week   Do not walk without cast or other shoe        Follow up at the wound center in one week.     Standing Status:   Future     Expiration Date:   2/10/2025    MRI foot/forefoot toes left wo contrast     Standing Status:   Future     Expected Date:   2/3/2025     Expiration Date:   2/3/2029     Scheduling Instructions:      There is no preparation for this test. Please leave your jewelry and valuables at home, wedding rings are the exception. All patients will be required to change into a hospital gown and pants.  Street clothes are not permitted in the MRI.  Magnetic nail polish must be removed prior to arrival for your test. Please bring your insurance cards, a form of photo ID and a list of your medications with you. Arrive 15 minutes prior to your appointment time in order to register. Please bring any prior CT or MRI studies of this area that were not performed at a [facility].            To schedule this appointment, please contact Central Scheduling at [phone].            Prior to your appointment, please make sure you complete the MRI Screening Form when you e-Check in for your appointment. This will be available starting 7 days before your appointment in [employer]. You may receive an e-mail with an activation code if you do not have a [employer] account. If you do not have access to a device, we will complete your screening at your appointment.     Reason for Exam:   non-healing wound left 3rd toe to rule out osteomyelitis     For OP exams needed \"URGENT\", choose the " appropriate timeframe below and call Central Scheduling at [phone]. No need to speak with a Radiologist.:   URGENT- within 1 week     What is the patient's sedation requirement?:   No Sedation     Does the patient need medication for Claustrophobia? If yes, order medication at this point.:   No     Does the patient wear a life vest, have an implanted cardiac device, a stimulation device, a sleep apnea stimulator, or a breast tissue expansion device?:   Unknown     Release to patient through Mychart:   Immediate

## 2025-02-03 NOTE — PROGRESS NOTES
Wound Procedure Treatment Diabetic Ulcer Anterior;Left Toe D3, third    Performed by: Nora Owen RN  Authorized by: Satnam Ponce DPM    Associated wounds:   Wound 12/30/24 Diabetic Ulcer Toe D3, third Anterior;Left  Wound cleansed with:  NSS  Applied Topical: Woun'Dres    Applied secondary dressing:  Gauze  Dressing secured with:  Kacy and Tape  Comments:  Stretch net applied over all

## 2025-02-04 ENCOUNTER — HOSPITAL ENCOUNTER (OUTPATIENT)
Dept: MRI IMAGING | Facility: HOSPITAL | Age: 75
Discharge: HOME | End: 2025-02-04
Attending: PODIATRIST
Payer: COMMERCIAL

## 2025-02-04 DIAGNOSIS — E11.40 TYPE 2 DIABETES MELLITUS WITH DIABETIC NEUROPATHY, UNSPECIFIED WHETHER LONG TERM INSULIN USE (HCC): ICD-10-CM

## 2025-02-04 DIAGNOSIS — L97.521 ULCER OF TOE OF LEFT FOOT, LIMITED TO BREAKDOWN OF SKIN (HCC): ICD-10-CM

## 2025-02-04 PROCEDURE — 73718 MRI LOWER EXTREMITY W/O DYE: CPT

## 2025-02-04 NOTE — PROGRESS NOTES
Name: Won Gaitan Jr.      : 1950      MRN: [id number]  Encounter Provider: Pieter Torres DO  Encounter Date: 2/3/2025   Encounter department: [facility]  :  Assessment & Plan  Anxiety and depression  Increase dose, no major improvement    Orders:  •  escitalopram (LEXAPRO) 10 mg tablet; Take 1 tablet (10 mg total) by mouth daily    Cellulitis of left lower leg  stable  Orders:  •  diltiazem (CARDIZEM) 120 MG tablet; Take 1 tablet (120 mg total) by mouth every 12 (twelve) hours    Hyperlipidemia, unspecified hyperlipidemia type  stable  Orders:  •  atorvastatin (LIPITOR) 40 mg tablet; Take 1 tablet (40 mg total) by mouth daily    Atrial fibrillation, unspecified type (HCC)  stble  Orders:  •  Eliquis 5 MG; Take 1 tablet (5 mg total) by mouth 2 (two) times a day    Essential hypertension  stable  Orders:  •  lisinopril (ZESTRIL) 40 mg tablet; Take 1 tablet (40 mg total) by mouth daily    Type 2 diabetes mellitus with diabetic neuropathy, without long-term current use of insulin ([facility])  stable  Lab Results   Component Value Date    HGBA1C 7.7 (H) 2025       Orders:  •  metFORMIN (GLUCOPHAGE-XR) 750 mg 24 hr tablet; Take 1 tablet (750 mg total) by mouth 2 (two) times a day with meals    Benign prostatic hyperplasia with weak urinary stream  stable  Orders:  •  tamsulosin (FLOMAX) 0.4 mg; Take 1 capsule (0.4 mg total) by mouth daily with dinner           History of Present Illness   Discussion on anxiety.    Feels a lot is going on, disconnected w/ family and wife.    Media upests him.      Review of Systems   Constitutional:  Negative for chills and fever.   HENT:  Negative for ear pain and sore throat.    Eyes:  Negative for pain and visual disturbance.   Respiratory:  Negative for cough and shortness of breath.    Cardiovascular:  Negative for chest pain and palpitations.   Gastrointestinal:  Negative for abdominal pain and vomiting.   Genitourinary:  Negative for dysuria  and hematuria.   Musculoskeletal:  Negative for arthralgias and back pain.   Skin:  Negative for color change and rash.   Neurological:  Negative for seizures and syncope.   All other systems reviewed and are negative.      Objective   /64 (BP Location: Left arm, Patient Position: Sitting, Cuff Size: Large)   Pulse 81   Temp (!) 97.2 °F (36.2 °C)   Wt 112 kg (246 lb 3.2 oz)   SpO2 97%   BMI 29.97 kg/m²      Physical Exam  Vitals reviewed.   Constitutional:       General: He is not in acute distress.     Appearance: Normal appearance. He is well-developed.   HENT:      Head: Normocephalic and atraumatic.   Eyes:      Conjunctiva/sclera: Conjunctivae normal.   Cardiovascular:      Rate and Rhythm: Normal rate and regular rhythm.      Pulses: Normal pulses.      Heart sounds: Normal heart sounds. No murmur heard.  Pulmonary:      Effort: Pulmonary effort is normal. No respiratory distress.      Breath sounds: Normal breath sounds.   Abdominal:      Palpations: Abdomen is soft.      Tenderness: There is no abdominal tenderness.   Musculoskeletal:         General: No swelling.      Cervical back: Neck supple.   Skin:     General: Skin is warm and dry.      Capillary Refill: Capillary refill takes less than 2 seconds.   Neurological:      Mental Status: He is alert.   Psychiatric:         Mood and Affect: Mood normal.       Administrative Statements   I have spent a total time of 38 minutes in caring for this patient on the day of the visit/encounter including Diagnostic results, Prognosis, Risks and benefits of tx options, Instructions for management, Patient and family education, Importance of tx compliance, Risk factor reductions, Impressions, Counseling / Coordination of care, Documenting in the medical record, Reviewing / ordering tests, medicine, procedures  , and Obtaining or reviewing history  .

## 2025-02-04 NOTE — ASSESSMENT & PLAN NOTE
Increase dose, no major improvement    Orders:  •  escitalopram (LEXAPRO) 10 mg tablet; Take 1 tablet (10 mg total) by mouth daily

## 2025-02-04 NOTE — ASSESSMENT & PLAN NOTE
stable  Orders:  •  atorvastatin (LIPITOR) 40 mg tablet; Take 1 tablet (40 mg total) by mouth daily

## 2025-02-04 NOTE — ASSESSMENT & PLAN NOTE
stable  Orders:  •  tamsulosin (FLOMAX) 0.4 mg; Take 1 capsule (0.4 mg total) by mouth daily with dinner

## 2025-02-04 NOTE — ASSESSMENT & PLAN NOTE
stable  Lab Results   Component Value Date    HGBA1C 7.7 (H) 01/16/2025       Orders:  •  metFORMIN (GLUCOPHAGE-XR) 750 mg 24 hr tablet; Take 1 tablet (750 mg total) by mouth 2 (two) times a day with meals

## 2025-02-04 NOTE — ASSESSMENT & PLAN NOTE
stable  Orders:  •  diltiazem (CARDIZEM) 120 MG tablet; Take 1 tablet (120 mg total) by mouth every 12 (twelve) hours

## 2025-02-06 ENCOUNTER — TELEPHONE (OUTPATIENT)
Age: 75
End: 2025-02-06

## 2025-02-06 DIAGNOSIS — I48.91 ATRIAL FIBRILLATION, UNSPECIFIED TYPE (HCC): Primary | ICD-10-CM

## 2025-02-06 RX ORDER — CLOPIDOGREL BISULFATE 75 MG/1
75 TABLET ORAL DAILY
Qty: 90 TABLET | Refills: 1 | Status: SHIPPED | OUTPATIENT
Start: 2025-02-06

## 2025-02-06 NOTE — TELEPHONE ENCOUNTER
Pt's wife called and stated the Eliquis is now costing $476 because of deductible until they pay $2000.  They can't afford that.    Wondering if he can be changed to Plavix - pharmacy suggested that and cost would be $20.    Please advise [phone]    Pt is due for refills so need to know what to do. If new script can be sent to [facility].

## 2025-02-10 ENCOUNTER — TELEMEDICINE (OUTPATIENT)
Dept: FAMILY MEDICINE CLINIC | Facility: CLINIC | Age: 75
End: 2025-02-10

## 2025-02-10 ENCOUNTER — OFFICE VISIT (OUTPATIENT)
Dept: WOUND CARE | Facility: CLINIC | Age: 75
End: 2025-02-10
Payer: COMMERCIAL

## 2025-02-10 VITALS
DIASTOLIC BLOOD PRESSURE: 78 MMHG | RESPIRATION RATE: 18 BRPM | SYSTOLIC BLOOD PRESSURE: 130 MMHG | HEART RATE: 64 BPM | TEMPERATURE: 97.4 F

## 2025-02-10 DIAGNOSIS — E11.40 TYPE 2 DIABETES MELLITUS WITH DIABETIC NEUROPATHY, WITHOUT LONG-TERM CURRENT USE OF INSULIN (HCC): ICD-10-CM

## 2025-02-10 DIAGNOSIS — Z59.71 INSURANCE COVERAGE PROBLEMS: ICD-10-CM

## 2025-02-10 DIAGNOSIS — E11.40 TYPE 2 DIABETES MELLITUS WITH DIABETIC NEUROPATHY, UNSPECIFIED WHETHER LONG TERM INSULIN USE (HCC): ICD-10-CM

## 2025-02-10 DIAGNOSIS — L97.412 ULCER OF HEEL AND MIDFOOT, RIGHT, WITH FAT LAYER EXPOSED (HCC): Primary | ICD-10-CM

## 2025-02-10 DIAGNOSIS — Z79.4 TYPE 2 DIABETES MELLITUS WITH DIABETIC NEUROPATHY, WITH LONG-TERM CURRENT USE OF INSULIN (HCC): Primary | Chronic | ICD-10-CM

## 2025-02-10 DIAGNOSIS — L97.521 ULCER OF TOE OF LEFT FOOT, LIMITED TO BREAKDOWN OF SKIN (HCC): ICD-10-CM

## 2025-02-10 DIAGNOSIS — E11.40 TYPE 2 DIABETES MELLITUS WITH DIABETIC NEUROPATHY, WITH LONG-TERM CURRENT USE OF INSULIN (HCC): Primary | Chronic | ICD-10-CM

## 2025-02-10 PROCEDURE — 99213 OFFICE O/P EST LOW 20 MIN: CPT | Performed by: PODIATRIST

## 2025-02-10 PROCEDURE — 99214 OFFICE O/P EST MOD 30 MIN: CPT | Performed by: PODIATRIST

## 2025-02-10 PROCEDURE — PBNCHG PB NO CHARGE PLACEHOLDER: Performed by: PHARMACIST

## 2025-02-10 RX ORDER — LIDOCAINE 40 MG/G
CREAM TOPICAL ONCE
Status: COMPLETED | OUTPATIENT
Start: 2025-02-10 | End: 2025-02-10

## 2025-02-10 RX ORDER — INSULIN GLARGINE 100 [IU]/ML
30 INJECTION, SOLUTION SUBCUTANEOUS
Qty: 45 ML | Refills: 1 | Status: SHIPPED | OUTPATIENT
Start: 2025-02-10

## 2025-02-10 RX ADMIN — LIDOCAINE 1 APPLICATION: 40 CREAM TOPICAL at 10:07

## 2025-02-10 NOTE — PROGRESS NOTES
[facility] Clinical Integration Pharmacy Services   Juan Manuel Villalobos, Ailyn     Won Gaitan Jr. is a 74 y.o. male who was referred to the clinical pharmacist by Pieter Torres DO for diabetes. Patient presents via telephone for follow-up.      Virtual Care Documentation  Encounter provider John Kerns    The Patient is located at Home and in the following state in which I hold an active license: [place].    The patient was identified by name and date of birth. Won Gaitan Jr. was informed that this is a telemedicine visit and that the visit is being conducted through the [employer] Teams platform. He agrees to proceed.  My office door was closed. No one else was in the room. He acknowledged consent and understanding of privacy and security of the telephone platform. The patient has agreed to participate and understands they can discontinue the visit at any time.       Assessment & Plan  Type 2 diabetes mellitus with diabetic neuropathy, with long-term current use of insulin ([facility])    Lab Results   Component Value Date    HGBA1C 7.7 (H) 01/16/2025    HGBA1C 8.0 (A) 07/12/2024     -  Most recent A1c: above goal but has improved.   - CGM TIR: below goal; TBR meets meet goal.   - Medications: well tolerated; denies any side effects from current medications.    Not able to calibrate FreeStyle Taylor CGM systems. Offered to switch patient to Dexcom CGM. States he will stay with Taylor at this time.      - Interventions:  Will titrate up Lantus dose. Increase by 2 units every week up to 34 units SQ daily.  - Hold if there are any hypoglycemic episodes.    Requested replacement sensors for patient    Insurance coverage problems    Patient has had difficulty with cost of Ozempic in the past and now Eliquis    - Interventions:  Strongly encouraged patient's wife to apply for PACE/PACENET    Follow-up:   Follow-up with pharmacist in 4 weeks  Next PCP visit: 2/17/2025    - Home Monitoring Records: CGM data.  - Labs: A1c due  on or after 4/16/2025    Subjective:     Won Gaitan Jr. is a 74 y.o. male with a history of type 2 diabetes with long term use of insulin. Diabetes course has been improving with treatment. Denies recent illness or hospitalizations. He denies any polyuria, polydipsia, polyphagia, fatigue and blurry vision. Denies recent hypoglycemic episodes.     Has had some technical issues with Taylor sensors. Expresses concern regarding accuracy. Also has had several sensors stop working before 14-day wear period.     Progress with his foot. R heel ulcer has healed. New ulcer on L 3rd toe.       DM Self-Management:  - Self-monitoring: is performed regularly.  He checks glucose levels very frequently using CGM (> 30 scans/day). He uses smartphone as . Patient is connected to clinic [facility] account.   - Hypoglycemic episodes: none recently - History of Level 3/severe hypoglycemic event: none   - Hypoglycemia symptoms: weak, shaky; symptomatic with glucose levels 80-90  - Treatment of hypoglycemia: discussed treatment   Glucometer: Yes, Brand: Accu-Check Guide Me   CGM: Yes, Brand: FreeStyle Taylor 3     Current DM Regimen:  Humalog 20 units SQ AC  Lantus 30 units SQ HS (0.27 units/kg)  Metformin 750 mg BID    DM History:  Health Status: Healthy; Goals - A1c: <7-7.5%; Time In Range: >70% with TBR <1%: per ADA Guidelines for older adults  Microvascular complications: nephropathy and peripheral neuropathy; S/P transmetatarsal amputation of R foot; S/P amputation of L lesser toe. Followed closely by Podiatry   Cardiovascular complications: none  - Statin: Yes   - ACEi/ARB: Yes    Previous DM medications:   Januvia  Glimepiride  Ozempic (cost)    Cardiometabolic Risk Factors:    Diabetes Composite Score: 5   Values used to calculate this score:    Points  Metrics       1        Blood Pressure: 130/78       1        Prescribed Statins: Yes       1        Hemoglobin A1c: 7.7%       1        Smokes Tobacco: No       1         Prescribed Aspirin: N/A - patient does not meet cardiovascular risk criteria    - HF: No    - CKD: normal GFR with albuminuria     Drug Safety:    - Thyroid cancer: No  - History of pancreatitis: No    Drug Therapy Problems:  - Medication Adherence: Responsible for medication management: patient and spouse. Patient is noncompliant some of the time; takes insulin as scheduled, however; has history of self-adjusting insulin doses.     CGM Data Review:     Device: FreeStyle Taylor 3  Dates: 1/29 - 2/11/2025  Usage: 100%  Average glucose (mg/dL): 179  GMI (approx. lab A1c): 7.6%  Glycemic variability (CV): 24.9%    Glycemic patterns:  hyperglycemia throughout the day, smooth and tight glucose control  Hypoglycemia: none       Metric CGM Target   Time above range (TAR) Very High >250 mg/dL 6 <5%   Time above range (TAR) High >180 mg/dL 38 <25%   Time in range (TIR)  mg/dL 56 >70%   Time below range (TBR) Low <70 mg/dL 0 <4%    Time below range (TBR)Very Low <54 mg/dL 0 <1%     More than 72 hours of data was reviewed. Report to be scanned to chart.          Meds/Allergies     Current Outpatient Medications:     acetaminophen (Mapap Arthritis Pain) 650 mg CR tablet, Take 1 tablet (650 mg total) by mouth 2 (two) times a day, Disp: 60 tablet, Rfl: 1    atorvastatin (LIPITOR) 40 mg tablet, Take 1 tablet (40 mg total) by mouth daily, Disp: 90 tablet, Rfl: 1    clopidogrel (Plavix) 75 mg tablet, Take 1 tablet (75 mg total) by mouth daily, Disp: 90 tablet, Rfl: 1    Continuous Glucose Sensor (FreeStyle Taylor 3 Sensor) MISC, Use 1 each every 14 (fourteen) days, Disp: 1 each, Rfl: 0    diltiazem (CARDIZEM) 120 MG tablet, Take 1 tablet (120 mg total) by mouth every 12 (twelve) hours, Disp: 180 tablet, Rfl: 1    Eliquis 5 MG, Take 1 tablet (5 mg total) by mouth 2 (two) times a day, Disp: 60 tablet, Rfl: 11    escitalopram (LEXAPRO) 10 mg tablet, Take 1 tablet (10 mg total) by mouth daily, Disp: 90 tablet, Rfl: 0    glucose  blood (Accu-Chek Guide) test strip, USE DAILY, Disp: 100 strip, Rfl: 2    Insulin Glargine Solostar (Lantus SoloStar) 100 UNIT/ML SOPN, Inject 0.35 mL (35 Units total) under the skin daily at bedtime, Disp: 45 mL, Rfl: 1    insulin lispro (HumaLOG) 100 units/mL injection pen, Inject up to 20 Units under the skin 3 (three) times a day with meals, Disp: 15 mL, Rfl: 5    Insulin Pen Needle (Easy Comfort Pen Needles) 33G X 5 MM MISC, USE AS DIRECTED, Disp: 100 each, Rfl: 4    Lancets (onetouch ultrasoft) lancets, Use as instructed, Disp: 100 each, Rfl: 3    Lancets (onetouch ultrasoft) lancets, Use daily, Disp: 100 each, Rfl: 3    lisinopril (ZESTRIL) 40 mg tablet, Take 1 tablet (40 mg total) by mouth daily, Disp: 90 tablet, Rfl: 1    metFORMIN (GLUCOPHAGE-XR) 750 mg 24 hr tablet, Take 1 tablet (750 mg total) by mouth 2 (two) times a day with meals, Disp: 180 tablet, Rfl: 1    metoprolol succinate (TOPROL-XL) 100 mg 24 hr tablet, TAKE 1 TABLET (100 MG TOTAL) BY MOUTH 2 (TWO) TIMES A DAY, Disp: 180 tablet, Rfl: 0    mupirocin (BACTROBAN) 2 % ointment, Apply topically 3 (three) times a day, Disp: 15 g, Rfl: 0    tamsulosin (FLOMAX) 0.4 mg, Take 1 capsule (0.4 mg total) by mouth daily with dinner, Disp: 90 capsule, Rfl: 1  No current facility-administered medications for this visit.    Allergies   Allergen Reactions    Simvastatin Myalgia    Itraconazole Other (See Comments)     Pt denies knowledge of allergy..       Administrative Statements   Pharmacist Tracking Tool:   Reason For Outreach: Embedded Pharmacist  Demographics:  Intervention Method: Phone  Type of Intervention: Follow-Up  Topics Addressed: Diabetes  Pharmacologic Interventions: Dose or Frequency Adjusted  Non-Pharmacologic Interventions: Personal CGM  Time:  Direct Patient Care:  25  mins  Care Coordination:  15  mins  Recommendation Recipient: Patient/Caregiver  Outcome: Accepted    Documentation under collaborative practice agreement. Orders pended for  PCP signature if needed.    Juan Manuel Villalobos, PharmD  Clinical Integration Pharmacist  [facility]

## 2025-02-10 NOTE — ASSESSMENT & PLAN NOTE
Lab Results   Component Value Date    HGBA1C 7.7 (H) 01/16/2025    HGBA1C 8.0 (A) 07/12/2024     -  Most recent A1c: above goal but has improved.   - CGM TIR: below goal; TBR meets meet goal.   - Medications: well tolerated; denies any side effects from current medications.    Not able to calibrate FreeStyle Taylor CGM systems. Offered to switch patient to Dexcom CGM. States he will stay with Taylor at this time.      - Interventions:  Will titrate up Lantus dose. Increase by 2 units every week up to 34 units SQ daily.  - Hold if there are any hypoglycemic episodes.    Requested replacement sensors for patient

## 2025-02-10 NOTE — TELEPHONE ENCOUNTER
[facility] Clinical Integration Pharmacy Services  Juan Manuel Villalobos, Ailyn     Patient has discontinued Ozempic due to cost. Now switched from Eliquis to clopidogrel due to cost (not preferred in [place]).     Patient and spouse are likely eligible for PACE/PACENET. Have provided application and discussed program in the past to address medication cost concerns.    Spoke with patient's wife today. Strongly encouraged patient and wife to apply.

## 2025-02-10 NOTE — PROGRESS NOTES
Patient ID: Won Gaitan Jr. is a 74 y.o. male Date of Birth 1950       Chief Complaint   Patient presents with    Follow Up Wound Care Visit       Allergies:  Simvastatin and Itraconazole    Assessments:      Diagnosis ICD-10-CM Associated Orders   1. Ulcer of heel and midfoot, right, with fat layer exposed ([facility])  L97.412 lidocaine (LMX) 4 % cream     Wound cleansing and dressings Diabetic Ulcer Right;Plantar Foot     Wound Procedure Treatment Diabetic Ulcer Right;Plantar Foot      2. Ulcer of toe of left foot, limited to breakdown of skin ([facility])  L97.521 lidocaine (LMX) 4 % cream     Wound cleansing and dressings Diabetic Ulcer Anterior;Left Toe D3, third     Wound Procedure Treatment Diabetic Ulcer Anterior;Left Toe D3, third      3. Type 2 diabetes mellitus with diabetic neuropathy, unspecified whether long term insulin use ([facility])  E11.40                Plan:   Reviewed medical records.  MRI of left foot reviewed/ discussed.  MRI negative for osteomyelitis.   Patient was counseled and educated on the condition and the diagnosis.    2.  The diagnosis, treatment options and prognosis were discussed with the patient.    3.  Right foot ulcer remains healed.  Transition to custom shoe.  Will apply football dressing to left foot for more off-loading / immobilization.    4. Stressed on patient compliance about proper off-loading, and staying off of foot to reduce the risk of infection, non-healing, and limb loss.    5. Patient will return in 1 week for re-evaluation.         Imaging: I have personally reviewed pertinent films in PACS  Labs, pathology, and Other Studies: I have personally reviewed pertinent reports.        Procedures     Subjective:   HPI  The patient presents for evaluation and treatment of wounds in both feet.  No significant pain.  Left 3rd toe wound looks better.  He presents with regular shoe on left foot.      The following portions of the patient's history were reviewed and updated as  "appropriate:   Patient Active Problem List   Diagnosis    Hyperlipidemia    Type 2 diabetes mellitus with diabetic neuropathy (HCC)    Atrial fibrillation (HCC)    Right middle lobe pulmonary nodule    Type 2 diabetes mellitus with other skin ulcer (CODE) (HCC)    Essential hypertension    Benign prostatic hyperplasia with lower urinary tract symptoms    Microalbuminuria    Ambulatory dysfunction    S/P transmetatarsal amputation of foot, right (HCC)    Sleep disturbance    Anxiety and depression    S/P R ALT flap to right foot    Gait abnormality    Vasomotor rhinitis    Corns and callosities    Tinea unguium    Cellulitis of left lower leg    Puncture wound of left foot with foreign body, subsequent encounter    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 (HCC)    Personal history of COVID-19    Bronchitis    S/P amputation of lesser toe, left (HCC)    Ulcer of toe of left foot, limited to breakdown of skin (HCC)    Medication management    Subconjunctival hemorrhage of right eye     Past Medical History:   Diagnosis Date    Arthritis     Atrial fibrillation (HCC)     Diagnosed 11/2018    Benign prostate hyperplasia 04/2002    Cellulitis     right lower leg     Colon polyp 2006    Diabetes mellitus (HCC)     Hearing aid worn     bilat    Hearing loss     90% loss left ear and 40% right ear    History of clubfoot     \"since birth\"    History of pneumonia     History of TIA (transient ischemic attack) 04/25/2017 11/30/18 pt denies    Hyperlipidemia 5/15/2014    Hypertension     Infectious viral hepatitis     \"cant remember type\"    Irregular heart beat     Afib    Neuropathy     both feet    Osteomyelitis (HCC)     right great toe    Pneumonia     Right middle lobe pulmonary nodule 11/6/2018    Seasickness     Teeth missing     Type 2 diabetes mellitus with diabetic neuropathy (HCC) 11/6/2018    Wears glasses     reading    Wound, open     bottom of " right foot     Past Surgical History:   Procedure Laterality Date    BUNIONECTOMY Right 12/4/2018    Procedure: 5TH METATARSAL BONE PARTIAL RESECTION, FULL THICKNESS DEBRIDEMENT OF DIABETIC ULCER;  Surgeon: Shala Flynn DPM;  [facility];  Service: Podiatry    CLOSURE DELAYED PRIMARY Left 10/30/2023    Procedure: CLOSURE DELAYED PRIMARY left foot;  Surgeon: Minh Urena DPM;  [facility];  Service: Podiatry    CLUB FOOT RELEASE Bilateral     COLONOSCOPY      COMPLEX WOUND CLOSURE TO EXTREMITY  4/27/2021    Procedure: COMPLEX WOUND CLOSURE TO EXTREMITY: RIGHT FOOT;  Surgeon: Niraj Bojorquez MD;  [facility];  Service: Plastics    EXTERNAL FIXATOR APPLICATION Right 2/12/2021    Procedure: Application multiplane external fixation;  Surgeon: Harry Zamudio DPM;  [facility];  Service: Podiatry    FOOT HARDWARE REMOVAL Right 2/17/2021    Procedure: REMOVAL EXTERNAL FIXATOR;  Surgeon: Harry Zamudio DPM;  [facility];  Service: Podiatry    FOREIGN BODY REMOVAL Right 4/27/2021    Procedure: REMOVAL FOREIGN BODY EXTREMITY: RIGHT FOOT;  Surgeon: Niraj Bojorquez MD;  [facility];  Service: Plastics    INCISION AND DRAINAGE OF WOUND Right 2/17/2021    Procedure: INCISION AND DRAINAGE (I&D) EXTREMITY;  Surgeon: Harry Zamudio DPM;  [facility];  Service: Podiatry    INCISION AND DRAINAGE OF WOUND Left 10/26/2023    Procedure: INCISION AND DRAINAGE (I&D) EXTREMITY; WASHOUT; REMOVAL OF FOREIGN BODY;  Surgeon: Satnam Ponce DPM;  [facility];  Service: Podiatry    ORIF FOOT FRACTURE Right 3/4/2021    Procedure: VAC PLACEMENT; SCREW FIXATION RIGHT FOOT FUSION;  Surgeon: Harry Zamudio DPM;  [facility];  Service: Podiatry    NM AMPUTATION FOOT TRANSMETARSAL Right 2/22/2021    Procedure: AMPUTATION TRANSMETATARSAL (TMA), REMOVAL NAIL IM T2 ICF HARDWARE;  Surgeon: Harry Zamudio DPM;  [facility];  Service: Podiatry     MD AMPUTATION METATARSAL W/TOE SINGLE Left 11/29/2023    Procedure: LEFT FOURTH RAY RESECTION FOOT;  Surgeon: Harry Zamudio DPM;  [facility];  Service: Podiatry    MD ARTHRD MIDTARSL/TARS MLT/TRANSVRS W/OSTEOT Right 2/12/2021    Procedure: foot ARTHRODESIS/FUSION;  Surgeon: Harry Zamudio DPM;  [facility];  Service: Podiatry    MD DEBRIDEMENT BONE 1ST 20 SQ CM/< Right 12/2/2021    Procedure: DEBRIDEMENT OF TARSAL BONES WITH ANITBIODIC BEADS;  Surgeon: Harry aZmudio DPM;  [facility];  Service: Podiatry    MD LNGTH/SHRT TENDON LEG/ANKLE 1 TENDON SPX Right 2/12/2021    Procedure: LENGTHEN TIBIAL TENDON, TRANS HEEL CORD;  Surgeon: Harry Zamudio DPM;  [facility];  Service: Podiatry    MD MUSC MYOCUTANEOUS/FASCIOCUTANEOUS FLAP TRUNK Right 4/12/2021    Procedure: RECONSTRUCTION MICROSURGICAL W/ FREE FLAP;  Surgeon: Niraj Bojorquez MD;  [facility];  Service: Plastics    MD MUSC MYOCUTANEOUS/FASCIOCUTANEOUS FLAP TRUNK Right 4/12/2021    Procedure: TAKEBACK RECONSTRUCTION MICROSURGICAL W/ FREE FLAP;  Surgeon: Niraj Bojorquez MD;  [facility];  Service: Plastics    MD MUSC MYOCUTANEOUS/FASCIOCUTANEOUS FLAP TRUNK Right 4/13/2021    Procedure: RECONSTRUCTION MICROSURGICAL W/ FREE FLAP, RE EXPLORATION, MICRO VASCULAR REVISION;  Surgeon: Niraj Bojorquez MD;  [facility];  Service: Plastics    MD OSTC PRTL EXOSTC/CONDYLC METAR HEAD Right 12/29/2020    Procedure: EXCISION EXOSTOSIS;  Surgeon: Harry Zamudio DPM;  [facility];  Service: Podiatry    MD SECONDARY CLOSURE SURG WOUND/DEHSN XTNSV/COMP Right 12/29/2020    Procedure: PRIMARY DELAYED CLOSURE;  Surgeon: Harry Zamudio DPM;  [facility];  Service: Podiatry    TOE AMPUTATION Right     partial great toe    TONSILLECTOMY      VAC DRESSING APPLICATION Right 3/1/2021    Procedure: APPLICATION VAC DRESSING EXTREMITY;  Surgeon: Niraj Bojorquez  MD;  [facility];  Service: Plastics    VASECTOMY  1992    WOUND DEBRIDEMENT Right 3/1/2021    Procedure: DEBRIDEMENT LOWER EXTREMITY (WASH OUT);  Surgeon: Niraj Bojorquez MD;  [facility];  Service: Plastics    WOUND DEBRIDEMENT Right 2021    Procedure: DEBRIDEMENT RIGHT FOOT;  Surgeon: Niraj Bojorquez MD;  [facility];  Service: Plastics    WOUND DEBRIDEMENT Right 2021    Procedure: DEBRIDEMENT LOWER EXTREMITY (WASH OUT);  Surgeon: Niraj Bojorquez MD;  [facility];  Service: Plastics    WOUND DEBRIDEMENT Right 2021    Procedure: DEBRIDEMENT LOWER EXTREMITY (WASH OUT): RIGHT FOOT;  Surgeon: Niraj Bojorquez MD;  [facility];  Service: Plastics     Family History   Problem Relation Age of Onset    Diabetes Father         mellitus      Social History     Socioeconomic History    Marital status: /Civil Union     Spouse name: None    Number of children: None    Years of education: None    Highest education level: None   Occupational History    None   Tobacco Use    Smoking status: Former     Current packs/day: 0.00     Types: Cigarettes     Quit date: 1988     Years since quittin.2     Passive exposure: Past    Smokeless tobacco: Former    Tobacco comments:     exposure to passive smoke   Vaping Use    Vaping status: Never Used   Substance and Sexual Activity    Alcohol use: Yes     Comment: occasionally    Drug use: Not Currently    Sexual activity: Yes     Partners: Female   Other Topics Concern    None   Social History Narrative    None     Social Drivers of Health     Financial Resource Strain: Not on file   Food Insecurity: No Food Insecurity (2024)    Nursing - Inadequate Food Risk Classification     Worried About Running Out of Food in the Last Year: Never true     Ran Out of Food in the Last Year: Never true     Ran Out of Food in the Last Year: Not on file   Transportation Needs: No  Transportation Needs (7/12/2024)    PRAPARE - Transportation     Lack of Transportation (Medical): No     Lack of Transportation (Non-Medical): No   Physical Activity: Not on file   Stress: Not on file   Social Connections: Not on file   Intimate Partner Violence: Not on file   Housing Stability: Low Risk  (7/12/2024)    Housing Stability Vital Sign     Unable to Pay for Housing in the Last Year: No     Number of Times Moved in the Last Year: 0     Homeless in the Last Year: No        Current Outpatient Medications:     acetaminophen (Mapap Arthritis Pain) 650 mg CR tablet, Take 1 tablet (650 mg total) by mouth 2 (two) times a day, Disp: 60 tablet, Rfl: 1    atorvastatin (LIPITOR) 40 mg tablet, Take 1 tablet (40 mg total) by mouth daily, Disp: 90 tablet, Rfl: 1    clopidogrel (Plavix) 75 mg tablet, Take 1 tablet (75 mg total) by mouth daily, Disp: 90 tablet, Rfl: 1    Continuous Glucose Sensor (FreeStyle Taylor 3 Sensor) MISC, Use 1 each every 14 (fourteen) days, Disp: 1 each, Rfl: 0    diltiazem (CARDIZEM) 120 MG tablet, Take 1 tablet (120 mg total) by mouth every 12 (twelve) hours, Disp: 180 tablet, Rfl: 1    Eliquis 5 MG, Take 1 tablet (5 mg total) by mouth 2 (two) times a day, Disp: 60 tablet, Rfl: 11    escitalopram (LEXAPRO) 10 mg tablet, Take 1 tablet (10 mg total) by mouth daily, Disp: 90 tablet, Rfl: 0    glucose blood (Accu-Chek Guide) test strip, USE DAILY, Disp: 100 strip, Rfl: 2    Insulin Glargine Solostar (Lantus SoloStar) 100 UNIT/ML SOPN, Inject 0.35 mL (35 Units total) under the skin daily at bedtime, Disp: 45 mL, Rfl: 1    insulin lispro (HumaLOG) 100 units/mL injection pen, Inject up to 20 Units under the skin 3 (three) times a day with meals, Disp: 15 mL, Rfl: 5    Insulin Pen Needle (Easy Comfort Pen Needles) 33G X 5 MM MISC, USE AS DIRECTED, Disp: 100 each, Rfl: 4    Lancets (onetouch ultrasoft) lancets, Use as instructed, Disp: 100 each, Rfl: 3    Lancets (onetouch ultrasoft) lancets, Use  daily, Disp: 100 each, Rfl: 3    lisinopril (ZESTRIL) 40 mg tablet, Take 1 tablet (40 mg total) by mouth daily, Disp: 90 tablet, Rfl: 1    metFORMIN (GLUCOPHAGE-XR) 750 mg 24 hr tablet, Take 1 tablet (750 mg total) by mouth 2 (two) times a day with meals, Disp: 180 tablet, Rfl: 1    metoprolol succinate (TOPROL-XL) 100 mg 24 hr tablet, TAKE 1 TABLET (100 MG TOTAL) BY MOUTH 2 (TWO) TIMES A DAY, Disp: 180 tablet, Rfl: 0    mupirocin (BACTROBAN) 2 % ointment, Apply topically 3 (three) times a day, Disp: 15 g, Rfl: 0    tamsulosin (FLOMAX) 0.4 mg, Take 1 capsule (0.4 mg total) by mouth daily with dinner, Disp: 90 capsule, Rfl: 1  No current facility-administered medications for this visit.    Review of Systems   Constitutional:  Negative for chills and fever.   Respiratory:  Negative for cough and shortness of breath.    Cardiovascular:  Negative for chest pain.   Gastrointestinal:  Negative for nausea and vomiting.   Skin:  Positive for wound.   Neurological:  Positive for numbness. Negative for weakness.       Objective:  /78   Pulse 64   Temp (!) 97.4 °F (36.3 °C)   Resp 18         Physical Exam  Vitals and nursing note reviewed.   Constitutional:       General: He is not in acute distress.     Appearance: He is not toxic-appearing or diaphoretic.   Cardiovascular:      Rate and Rhythm: Normal rate and regular rhythm.      Pulses:           Dorsalis pedis pulses are 1+ on the right side.        Posterior tibial pulses are 0 on the right side.   Pulmonary:      Effort: Pulmonary effort is normal. No respiratory distress.   Musculoskeletal:         General: Deformity present. No signs of injury.      Right foot: No foot drop.      Comments: S/P partial foot amputation right.   Skin:     General: Skin is warm.      Capillary Refill: Capillary refill takes less than 2 seconds.      Coloration: Skin is not cyanotic or mottled.      Findings: Wound present. No abscess.      Nails: There is no clubbing.       Comments: DFU in right plantar foot remains healed.  Ulcer of left 2nd toe is smaller.  Wound bed is granular.  No significant redness or swelling.  No exposed bone.  No signs of active infection. See wound assessment and photo.   Neurological:      General: No focal deficit present.      Mental Status: He is alert and oriented to person, place, and time.      Cranial Nerves: No cranial nerve deficit.      Sensory: Sensory deficit present.      Motor: No weakness.      Coordination: Coordination normal.   Psychiatric:         Mood and Affect: Mood normal.         Behavior: Behavior normal.         Thought Content: Thought content normal.         Judgment: Judgment normal.                                      Lab Results   Component Value Date    HGBA1C 7.7 (H) 01/16/2025       Wound Instructions:  Orders Placed This Encounter   Procedures    Wound cleansing and dressings Diabetic Ulcer Right;Plantar Foot     Right foot wound is healed !  Continue to protect the area   Cover with silicone bordered foam,   the dressing we applied is good for up to 4 days as long is it is not too soiled.     Standing Status:   Future     Expected Date:   2/10/2025     Expiration Date:   2/17/2025    Wound cleansing and dressings Diabetic Ulcer Anterior;Left Toe D3, third     Left third toe wound:    Apply acticoat 7 to wound bed  Cover with calcium alginate and dry gauze.  Secure with rolled gauze and tape.   Apply bulky dressing to Left foot  Tubifast over all     Change dressing weekly at the wound center.   Wear sugical shoe    Limit your walking for the next week     Follow up at the wound center in one week.     Standing Status:   Future     Expected Date:   2/10/2025     Expiration Date:   2/17/2025    Wound Procedure Treatment Diabetic Ulcer Right;Plantar Foot     This order was created via procedure documentation    Wound Procedure Treatment Diabetic Ulcer Anterior;Left Toe D3, third     This order was created via procedure  documentation             Satnam Ponce DPM

## 2025-02-10 NOTE — PROGRESS NOTES
Wound Procedure Treatment Diabetic Ulcer Right;Plantar Foot    Performed by: Lanette Faulkner RN  Authorized by: Satnam Ponce DPM    Associated wounds:   Wound 12/16/24 Diabetic Ulcer Foot Right;Plantar  Wound cleansed with:  Soap and water  Applied to periwound:  Moisture lotion  Applied primary dressing:  Silicone bordered foam  Wound Procedure Treatment Diabetic Ulcer Anterior;Left Toe D3, third    Performed by: Lanette Faulkner RN  Authorized by: Satnam Ponce DPM    Associated wounds:   Wound 12/30/24 Diabetic Ulcer Toe D3, third Anterior;Left  Wound cleansed with:  NSS  Applied to periwound:  Moisture lotion  Applied primary dressing:  Acticoat and Calcium alginate  Applied secondary dressing:  Gauze and Cast padding  Dressing secured with:  Kacy, Kerlix, Coban and Tubifast  Comments:  Acticoat 7, 2-cast paddings, tubifast yellow

## 2025-02-10 NOTE — PATIENT INSTRUCTIONS
Orders Placed This Encounter   Procedures    Wound cleansing and dressings Diabetic Ulcer Right;Plantar Foot     Right foot wound is healed !  Continue to protect the area   Cover with silicone bordered foam,   the dressing we applied is good for up to 4 days as long is it is not too soiled.     Standing Status:   Future     Expected Date:   2/10/2025     Expiration Date:   2/17/2025    Wound cleansing and dressings Diabetic Ulcer Anterior;Left Toe D3, third     Left third toe wound:    Apply acticoat 7 to wound bed  Cover with calcium alginate and dry gauze.  Secure with rolled gauze and tape.   Apply bulky dressing to Left foot  Tubifast over all     Change dressing weekly at the wound center.   Wear sugical shoe    Limit your walking for the next week     Follow up at the wound center in one week.     Standing Status:   Future     Expected Date:   2/10/2025     Expiration Date:   2/17/2025

## 2025-02-17 ENCOUNTER — HOSPITAL ENCOUNTER (INPATIENT)
Facility: HOSPITAL | Age: 75
LOS: 2 days | Discharge: HOME | DRG: 638 | End: 2025-02-19
Attending: PODIATRIST | Admitting: PODIATRIST
Payer: COMMERCIAL

## 2025-02-17 ENCOUNTER — TELEPHONE (OUTPATIENT)
Dept: FAMILY MEDICINE CLINIC | Facility: CLINIC | Age: 75
End: 2025-02-17

## 2025-02-17 ENCOUNTER — OFFICE VISIT (OUTPATIENT)
Dept: WOUND CARE | Facility: CLINIC | Age: 75
DRG: 638 | End: 2025-02-17
Payer: COMMERCIAL

## 2025-02-17 VITALS
DIASTOLIC BLOOD PRESSURE: 82 MMHG | HEART RATE: 76 BPM | SYSTOLIC BLOOD PRESSURE: 128 MMHG | TEMPERATURE: 97.6 F | RESPIRATION RATE: 16 BRPM

## 2025-02-17 DIAGNOSIS — Z79.4 TYPE 2 DIABETES MELLITUS WITH DIABETIC NEUROPATHY, WITH LONG-TERM CURRENT USE OF INSULIN (HCC): ICD-10-CM

## 2025-02-17 DIAGNOSIS — I48.11 LONGSTANDING PERSISTENT ATRIAL FIBRILLATION (HCC): ICD-10-CM

## 2025-02-17 DIAGNOSIS — E11.622 TYPE 2 DIABETES MELLITUS WITH OTHER SKIN ULCER (CODE) (HCC): ICD-10-CM

## 2025-02-17 DIAGNOSIS — I10 ESSENTIAL HYPERTENSION: ICD-10-CM

## 2025-02-17 DIAGNOSIS — L03.115 CELLULITIS OF RIGHT FOOT: ICD-10-CM

## 2025-02-17 DIAGNOSIS — L97.521 ULCER OF TOE OF LEFT FOOT, LIMITED TO BREAKDOWN OF SKIN (HCC): ICD-10-CM

## 2025-02-17 DIAGNOSIS — E11.40 TYPE 2 DIABETES MELLITUS WITH DIABETIC NEUROPATHY, WITH LONG-TERM CURRENT USE OF INSULIN (HCC): ICD-10-CM

## 2025-02-17 DIAGNOSIS — L97.412 ULCER OF HEEL AND MIDFOOT, RIGHT, WITH FAT LAYER EXPOSED (HCC): ICD-10-CM

## 2025-02-17 DIAGNOSIS — L97.512 ULCER OF RIGHT FOOT WITH FAT LAYER EXPOSED (HCC): Primary | ICD-10-CM

## 2025-02-17 DIAGNOSIS — L03.115 CELLULITIS OF RIGHT LOWER EXTREMITY: Primary | ICD-10-CM

## 2025-02-17 DIAGNOSIS — E11.40 TYPE 2 DIABETES MELLITUS WITH DIABETIC NEUROPATHY, UNSPECIFIED WHETHER LONG TERM INSULIN USE (HCC): ICD-10-CM

## 2025-02-17 LAB
ANION GAP SERPL CALCULATED.3IONS-SCNC: 9 MMOL/L (ref 4–13)
BUN SERPL-MCNC: 21 MG/DL (ref 5–25)
CALCIUM SERPL-MCNC: 9.2 MG/DL (ref 8.4–10.2)
CHLORIDE SERPL-SCNC: 105 MMOL/L (ref 96–108)
CO2 SERPL-SCNC: 26 MMOL/L (ref 21–32)
CREAT SERPL-MCNC: 1.09 MG/DL (ref 0.6–1.3)
GFR SERPL CREATININE-BSD FRML MDRD: 67 ML/MIN/1.73SQ M
GLUCOSE SERPL-MCNC: 187 MG/DL (ref 65–140)
POTASSIUM SERPL-SCNC: 4.2 MMOL/L (ref 3.5–5.3)
SODIUM SERPL-SCNC: 140 MMOL/L (ref 135–147)

## 2025-02-17 PROCEDURE — NC001 PR NO CHARGE: Performed by: INTERNAL MEDICINE

## 2025-02-17 PROCEDURE — 87205 SMEAR GRAM STAIN: CPT | Performed by: PODIATRIST

## 2025-02-17 PROCEDURE — 36415 COLL VENOUS BLD VENIPUNCTURE: CPT

## 2025-02-17 PROCEDURE — 80048 BASIC METABOLIC PNL TOTAL CA: CPT

## 2025-02-17 PROCEDURE — 99222 1ST HOSP IP/OBS MODERATE 55: CPT | Performed by: PODIATRIST

## 2025-02-17 PROCEDURE — 87070 CULTURE OTHR SPECIMN AEROBIC: CPT | Performed by: PODIATRIST

## 2025-02-17 PROCEDURE — 99284 EMERGENCY DEPT VISIT MOD MDM: CPT

## 2025-02-17 PROCEDURE — 11042 DBRDMT SUBQ TIS 1ST 20SQCM/<: CPT | Performed by: PODIATRIST

## 2025-02-17 PROCEDURE — 87147 CULTURE TYPE IMMUNOLOGIC: CPT | Performed by: PODIATRIST

## 2025-02-17 PROCEDURE — 87186 SC STD MICRODIL/AGAR DIL: CPT | Performed by: PODIATRIST

## 2025-02-17 PROCEDURE — 99222 1ST HOSP IP/OBS MODERATE 55: CPT | Performed by: INTERNAL MEDICINE

## 2025-02-17 RX ORDER — LIDOCAINE 40 MG/G
CREAM TOPICAL ONCE
Status: DISCONTINUED | OUTPATIENT
Start: 2025-02-17 | End: 2025-02-17

## 2025-02-17 RX ADMIN — LIDOCAINE: 40 CREAM TOPICAL at 10:13

## 2025-02-17 NOTE — ASSESSMENT & PLAN NOTE
On Eliquis 5mg at home, scheduled to switch today 2/17 to Plavix due to financial considerations. Also on diltiazem 120mg q12h and Toprol BID at home.  Continue Eliquis 5mg  Continue home diltiazem and Toprol  Monitor for cardiac sx or changes  Would benefit from continuing DOAC rather than switching to Plavix.

## 2025-02-17 NOTE — PATIENT INSTRUCTIONS
Orders Placed This Encounter   Procedures    Wound cleansing and dressings     Left third toe wound:     Apply acticoat 7 to wound bed  Cover with calcium alginate and dry gauze.  Secure with rolled gauze and tape.   Apply bulky dressing to Left foot  Tubifast over all     Change dressing weekly at the wound center.   Wear sugical shoe     Limit your walking for the next week      Follow up at the wound center in one week.     Standing Status:   Future     Expiration Date:   2/24/2025    Wound cleansing and dressings Diabetic Ulcer Right;Plantar Foot     Dr. Ponce is concerned there may be an infection in your right foot.    He would like you to go to the hospital for IV antibiotics.  He is reaching out to the hospital staff to get a bed for you.      We applied Acticoat 3 to wound bed today.  Covered with Abd pad.  Secured with kerlix & tape.     Standing Status:   Future     Expiration Date:   2/24/2025    Wound cleansing and dressings Diabetic Ulcer Anterior;Left Toe D3, third     Apply Acticoat 3 to the wound.  Cover with gauze.  Secure with tape.     Standing Status:   Future     Expiration Date:   2/24/2025    Debridement Diabetic Ulcer Right;Plantar Foot     This order was created via procedure documentation    Wound Procedure Treatment     This order was created via procedure documentation    Wound culture and Gram stain     Release to patient through Mychart:   Immediate    Transfer to other facility     Pt can go to ED as private pt for admission so treatment can be started right away if there is no bed available.     Call back Phone Number::   [phone]     Request Type::   Transfer to [facility] Facility [[id number]]     Hospital Area::   [facility] [[id number]]     Reason for Transfer::   Direct Admission [18]     Service::   Podiatry [[id number]]     Level of Care::   Med Surg [16]

## 2025-02-17 NOTE — ASSESSMENT & PLAN NOTE
R foot and lower leg swollen and discolored, consistent with cellulitis.  Management per Podiatry team, currently on ancef

## 2025-02-17 NOTE — PROGRESS NOTES
Patient ID: Won Gaitan Jr. is a 74 y.o. male Date of Birth 1950       Chief Complaint   Patient presents with    Follow Up Wound Care Visit     Follow up visit for wounds to BL feet.  Pt reports left foot wound, which had been closed at last visit is now open & draining.        Allergies:  Simvastatin and Itraconazole    Assessments:      Diagnosis ICD-10-CM Associated Orders   1. Cellulitis of right lower extremity  L03.115 Wound cleansing and dressings     Transfer to other facility     Wound culture and Gram stain     Wound Procedure Treatment      2. Ulcer of heel and midfoot, right, with fat layer exposed (Formerly [facility])  L97.412 lidocaine (LMX) 4 % cream     Wound cleansing and dressings     Wound cleansing and dressings Diabetic Ulcer Right;Plantar Foot     Wound cleansing and dressings Diabetic Ulcer Anterior;Left Toe D3, third     Transfer to other facility     Debridement Diabetic Ulcer Right;Plantar Foot     Wound culture and Gram stain     Wound Procedure Treatment      3. Ulcer of toe of left foot, limited to breakdown of skin (Formerly [facility])  L97.521 lidocaine (LMX) 4 % cream     Wound cleansing and dressings     Wound cleansing and dressings Diabetic Ulcer Right;Plantar Foot     Wound cleansing and dressings Diabetic Ulcer Anterior;Left Toe D3, third     Wound Procedure Treatment      4. Type 2 diabetes mellitus with diabetic neuropathy, unspecified whether long term insulin use (Formerly [facility])  E11.40 lidocaine (LMX) 4 % cream     Wound cleansing and dressings     Wound cleansing and dressings Diabetic Ulcer Right;Plantar Foot     Wound cleansing and dressings Diabetic Ulcer Anterior;Left Toe D3, third     Transfer to other facility     Debridement Diabetic Ulcer Right;Plantar Foot     Wound Procedure Treatment               Plan:  Reviewed medical records.  Patient was counseled and educated on the condition and the diagnosis.    2.  The diagnosis, treatment options and prognosis were discussed with the patient.   "  3.   Recurring right foot ulcer with cellulitis.  Wound debrided and culture taken.  He has high risk diabetic foot with history of previous foot infection and amputation.  Will admit him to [facility] for IV antibiotics and further evaluation.    4. See admit H&P for more details.         Imaging: I have personally reviewed pertinent films in PACS  Labs, pathology, and Other Studies: I have personally reviewed pertinent reports.        Debridement   Wound 12/16/24 Diabetic Ulcer Foot Right;Plantar    Universal Protocol:  Consent: Verbal consent obtained.  Risks and benefits: risks, benefits and alternatives were discussed  Consent given by: patient  Time out: Immediately prior to procedure a \"time out\" was called to verify the correct patient, procedure, equipment, support staff and site/side marked as required.  Timeout called at: 2/17/2025 10:36 AM.  Patient understanding: patient states understanding of the procedure being performed  Patient identity confirmed: verbally with patient    Debridement Details  Performed by: physician  Debridement type: surgical  Level of debridement: subcutaneous tissue      Post-debridement measurements  Length (cm): 3.5  Width (cm): 1.8  Depth (cm): 0.1  Percent debrided: 100%  Surface Area (cm^2): 6.3  Area Debrided (cm^2): 6.3  Volume (cm^3): 0.63    Tissue and other material debrided: dermis, epidermis and subcutaneous tissue  Devitalized tissue debrided: biofilm, callus, fibrin and slough  Instrument(s) utilized: blade  Technique utilized: excisionalBleeding: small  Hemostasis obtained with: pressure  Procedural pain (0-10): 0  Post-procedural pain: 0   Response to treatment: procedure was tolerated well         Subjective:     HPI  The patient presents for evaluation and treatment of wounds in both feet.  He noticed increased redness and swelling of right foot in the last couple of days.  Wound looks reopened with serous drainage.  He was on his feet more with shoveling snow last " "week.  Left 3rd toe ulcer is stable.            The following portions of the patient's history were reviewed and updated as appropriate:   Patient Active Problem List   Diagnosis    Cellulitis of right lower extremity    Hyperlipidemia    Type 2 diabetes mellitus with diabetic neuropathy (HCC)    Atrial fibrillation (HCC)    Right middle lobe pulmonary nodule    Type 2 diabetes mellitus with other skin ulcer (CODE) (HCC)    Essential hypertension    Benign prostatic hyperplasia with lower urinary tract symptoms    Microalbuminuria    Ambulatory dysfunction    S/P transmetatarsal amputation of foot, right (HCC)    Sleep disturbance    Anxiety and depression    S/P R ALT flap to right foot    Gait abnormality    Vasomotor rhinitis    Corns and callosities    Tinea unguium    Cellulitis of left lower leg    Puncture wound of left foot with foreign body, subsequent encounter    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 (HCC)    Personal history of COVID-19    Bronchitis    S/P amputation of lesser toe, left (HCC)    Ulcer of toe of left foot, limited to breakdown of skin (HCC)    Medication management    Subconjunctival hemorrhage of right eye     Past Medical History:   Diagnosis Date    Arthritis     Atrial fibrillation (HCC)     Diagnosed 11/2018    Benign prostate hyperplasia 04/2002    Cellulitis     right lower leg     Colon polyp 2006    Diabetes mellitus (HCC)     Hearing aid worn     bilat    Hearing loss     90% loss left ear and 40% right ear    History of clubfoot     \"since birth\"    History of pneumonia     History of TIA (transient ischemic attack) 04/25/2017 11/30/18 pt denies    Hyperlipidemia 5/15/2014    Hypertension     Infectious viral hepatitis     \"cant remember type\"    Irregular heart beat     Afib    Neuropathy     both feet    Osteomyelitis (HCC)     right great toe    Pneumonia     Right middle lobe pulmonary nodule 11/6/2018 "    Seasickness     Teeth missing     Type 2 diabetes mellitus with diabetic neuropathy (HCC) 11/6/2018    Wears glasses     reading    Wound, open     bottom of right foot     Past Surgical History:   Procedure Laterality Date    BUNIONECTOMY Right 12/4/2018    Procedure: 5TH METATARSAL BONE PARTIAL RESECTION, FULL THICKNESS DEBRIDEMENT OF DIABETIC ULCER;  Surgeon: Shala Flynn DPM;  [facility];  Service: Podiatry    CLOSURE DELAYED PRIMARY Left 10/30/2023    Procedure: CLOSURE DELAYED PRIMARY left foot;  Surgeon: Minh Urena DPM;  [facility];  Service: Podiatry    CLUB FOOT RELEASE Bilateral     COLONOSCOPY      COMPLEX WOUND CLOSURE TO EXTREMITY  4/27/2021    Procedure: COMPLEX WOUND CLOSURE TO EXTREMITY: RIGHT FOOT;  Surgeon: Niraj Bojorquez MD;  [facility];  Service: Plastics    EXTERNAL FIXATOR APPLICATION Right 2/12/2021    Procedure: Application multiplane external fixation;  Surgeon: Harry Zamudio DPM;  [facility];  Service: Podiatry    FOOT HARDWARE REMOVAL Right 2/17/2021    Procedure: REMOVAL EXTERNAL FIXATOR;  Surgeon: Harry Zamudio DPM;  [facility];  Service: Podiatry    FOREIGN BODY REMOVAL Right 4/27/2021    Procedure: REMOVAL FOREIGN BODY EXTREMITY: RIGHT FOOT;  Surgeon: Niraj Bojorquez MD;  [facility];  Service: Plastics    INCISION AND DRAINAGE OF WOUND Right 2/17/2021    Procedure: INCISION AND DRAINAGE (I&D) EXTREMITY;  Surgeon: Harry Zamudio DPM;  [facility];  Service: Podiatry    INCISION AND DRAINAGE OF WOUND Left 10/26/2023    Procedure: INCISION AND DRAINAGE (I&D) EXTREMITY; WASHOUT; REMOVAL OF FOREIGN BODY;  Surgeon: Satnam Ponce DPM;  [facility];  Service: Podiatry    ORIF FOOT FRACTURE Right 3/4/2021    Procedure: VAC PLACEMENT; SCREW FIXATION RIGHT FOOT FUSION;  Surgeon: Harry Zamudio DPM;  [facility];  Service: Podiatry    DC AMPUTATION FOOT TRANSMETARSAL Right  2/22/2021    Procedure: AMPUTATION TRANSMETATARSAL (TMA), REMOVAL NAIL IM T2 ICF HARDWARE;  Surgeon: Harry Zamudio DPM;  [facility];  Service: Podiatry    DC AMPUTATION METATARSAL W/TOE SINGLE Left 11/29/2023    Procedure: LEFT FOURTH RAY RESECTION FOOT;  Surgeon: Harry Zamudio DPM;  [facility];  Service: Podiatry    DC ARTHRD MIDTARSL/TARS MLT/TRANSVRS W/OSTEOT Right 2/12/2021    Procedure: foot ARTHRODESIS/FUSION;  Surgeon: Harry Zamudio DPM;  [facility];  Service: Podiatry    DC DEBRIDEMENT BONE 1ST 20 SQ CM/< Right 12/2/2021    Procedure: DEBRIDEMENT OF TARSAL BONES WITH ANITBIODIC BEADS;  Surgeon: Harry Zamudio DPM;  [facility];  Service: Podiatry    DC LNGTH/SHRT TENDON LEG/ANKLE 1 TENDON SPX Right 2/12/2021    Procedure: LENGTHEN TIBIAL TENDON, TRANS HEEL CORD;  Surgeon: Harry Zamudio DPM;  [facility];  Service: Podiatry    DC MUSC MYOCUTANEOUS/FASCIOCUTANEOUS FLAP TRUNK Right 4/12/2021    Procedure: RECONSTRUCTION MICROSURGICAL W/ FREE FLAP;  Surgeon: Niraj Bojorquez MD;  [facility];  Service: Plastics    DC MUSC MYOCUTANEOUS/FASCIOCUTANEOUS FLAP TRUNK Right 4/12/2021    Procedure: TAKEBACK RECONSTRUCTION MICROSURGICAL W/ FREE FLAP;  Surgeon: Niraj Bojorquez MD;  [facility];  Service: Plastics    DC MUSC MYOCUTANEOUS/FASCIOCUTANEOUS FLAP TRUNK Right 4/13/2021    Procedure: RECONSTRUCTION MICROSURGICAL W/ FREE FLAP, RE EXPLORATION, MICRO VASCULAR REVISION;  Surgeon: Niraj Bojorquez MD;  [facility];  Service: Plastics    DC OSTC PRTL EXOSTC/CONDYLC METAR HEAD Right 12/29/2020    Procedure: EXCISION EXOSTOSIS;  Surgeon: Harry Zamudio DPM;  [facility];  Service: Podiatry    DC SECONDARY CLOSURE SURG WOUND/DEHSN XTNSV/COMP Right 12/29/2020    Procedure: PRIMARY DELAYED CLOSURE;  Surgeon: Harry Zamudio DPM;  [facility];  Service: Podiatry    TOE AMPUTATION Right      partial great toe    TONSILLECTOMY      VAC DRESSING APPLICATION Right 3/1/2021    Procedure: APPLICATION VAC DRESSING EXTREMITY;  Surgeon: Niraj Bojorquez MD;  [facility];  Service: Plastics    VASECTOMY  1992    WOUND DEBRIDEMENT Right 3/1/2021    Procedure: DEBRIDEMENT LOWER EXTREMITY (WASH OUT);  Surgeon: Niraj Bojorquez MD;  [facility];  Service: Plastics    WOUND DEBRIDEMENT Right 2021    Procedure: DEBRIDEMENT RIGHT FOOT;  Surgeon: Niraj Bojorquez MD;  [facility];  Service: Plastics    WOUND DEBRIDEMENT Right 2021    Procedure: DEBRIDEMENT LOWER EXTREMITY (WASH OUT);  Surgeon: Niraj Bojorquez MD;  [facility];  Service: Plastics    WOUND DEBRIDEMENT Right 2021    Procedure: DEBRIDEMENT LOWER EXTREMITY (WASH OUT): RIGHT FOOT;  Surgeon: Niraj Bojorquez MD;  [facility];  Service: Plastics     Family History   Problem Relation Age of Onset    Diabetes Father         mellitus      Social History     Socioeconomic History    Marital status: /Civil Union     Spouse name: None    Number of children: None    Years of education: None    Highest education level: None   Occupational History    None   Tobacco Use    Smoking status: Former     Current packs/day: 0.00     Types: Cigarettes     Quit date: 1988     Years since quittin.3     Passive exposure: Past    Smokeless tobacco: Former    Tobacco comments:     exposure to passive smoke   Vaping Use    Vaping status: Never Used   Substance and Sexual Activity    Alcohol use: Yes     Comment: occasionally    Drug use: Not Currently    Sexual activity: Yes     Partners: Female   Other Topics Concern    None   Social History Narrative    None     Social Drivers of Health     Financial Resource Strain: Not on file   Food Insecurity: No Food Insecurity (2024)    Nursing - Inadequate Food Risk Classification     Worried About Running Out of Food in  the Last Year: Never true     Ran Out of Food in the Last Year: Never true     Ran Out of Food in the Last Year: Not on file   Transportation Needs: No Transportation Needs (7/12/2024)    PRAPARE - Transportation     Lack of Transportation (Medical): No     Lack of Transportation (Non-Medical): No   Physical Activity: Not on file   Stress: Not on file   Social Connections: Not on file   Intimate Partner Violence: Not on file   Housing Stability: Low Risk  (7/12/2024)    Housing Stability Vital Sign     Unable to Pay for Housing in the Last Year: No     Number of Times Moved in the Last Year: 0     Homeless in the Last Year: No        Current Outpatient Medications:     acetaminophen (Mapap Arthritis Pain) 650 mg CR tablet, Take 1 tablet (650 mg total) by mouth 2 (two) times a day, Disp: 60 tablet, Rfl: 1    atorvastatin (LIPITOR) 40 mg tablet, Take 1 tablet (40 mg total) by mouth daily, Disp: 90 tablet, Rfl: 1    clopidogrel (Plavix) 75 mg tablet, Take 1 tablet (75 mg total) by mouth daily, Disp: 90 tablet, Rfl: 1    Continuous Glucose Sensor (FreeStyle Taylor 3 Sensor) MISC, Use 1 each every 14 (fourteen) days, Disp: 1 each, Rfl: 0    diltiazem (CARDIZEM) 120 MG tablet, Take 1 tablet (120 mg total) by mouth every 12 (twelve) hours, Disp: 180 tablet, Rfl: 1    Eliquis 5 MG, Take 1 tablet (5 mg total) by mouth 2 (two) times a day, Disp: 60 tablet, Rfl: 11    escitalopram (LEXAPRO) 10 mg tablet, Take 1 tablet (10 mg total) by mouth daily, Disp: 90 tablet, Rfl: 0    glucose blood (Accu-Chek Guide) test strip, USE DAILY, Disp: 100 strip, Rfl: 2    Insulin Glargine Solostar (Lantus SoloStar) 100 UNIT/ML SOPN, Inject 0.3 mL (30 Units total) under the skin daily at bedtime, Disp: 45 mL, Rfl: 1    insulin lispro (HumaLOG) 100 units/mL injection pen, Inject up to 20 Units under the skin 3 (three) times a day with meals, Disp: 15 mL, Rfl: 5    Insulin Pen Needle (Easy Comfort Pen Needles) 33G X 5 MM MISC, USE AS DIRECTED,  Disp: 100 each, Rfl: 4    Lancets (onetouch ultrasoft) lancets, Use as instructed, Disp: 100 each, Rfl: 3    Lancets (onetouch ultrasoft) lancets, Use daily, Disp: 100 each, Rfl: 3    lisinopril (ZESTRIL) 40 mg tablet, Take 1 tablet (40 mg total) by mouth daily, Disp: 90 tablet, Rfl: 1    metFORMIN (GLUCOPHAGE-XR) 750 mg 24 hr tablet, Take 1 tablet (750 mg total) by mouth 2 (two) times a day with meals, Disp: 180 tablet, Rfl: 1    metoprolol succinate (TOPROL-XL) 100 mg 24 hr tablet, TAKE 1 TABLET (100 MG TOTAL) BY MOUTH 2 (TWO) TIMES A DAY, Disp: 180 tablet, Rfl: 0    mupirocin (BACTROBAN) 2 % ointment, Apply topically 3 (three) times a day, Disp: 15 g, Rfl: 0    tamsulosin (FLOMAX) 0.4 mg, Take 1 capsule (0.4 mg total) by mouth daily with dinner, Disp: 90 capsule, Rfl: 1  No current facility-administered medications for this visit.    Review of Systems   Constitutional:  Negative for chills and fever.   Respiratory:  Positive for cough. Negative for shortness of breath.    Cardiovascular:  Negative for chest pain.   Gastrointestinal:  Negative for nausea and vomiting.   Skin:  Positive for wound.   Neurological:  Positive for numbness. Negative for weakness.       Objective:  /82   Pulse 76   Temp 97.6 °F (36.4 °C) (Tympanic)   Resp 16   Pain Score: 0-No pain     Physical Exam  Vitals and nursing note reviewed.   Constitutional:       General: He is not in acute distress.     Appearance: He is not toxic-appearing or diaphoretic.   Cardiovascular:      Rate and Rhythm: Normal rate and regular rhythm.      Pulses:           Dorsalis pedis pulses are 1+ on the right side.        Posterior tibial pulses are 0 on the right side.   Pulmonary:      Effort: Pulmonary effort is normal. No respiratory distress.   Musculoskeletal:         General: Deformity present. No signs of injury.      Right foot: No foot drop.      Comments: S/P partial foot amputation right.   Skin:     General: Skin is warm.      Capillary  Refill: Capillary refill takes less than 2 seconds.      Coloration: Skin is not cyanotic or mottled.      Findings: Wound present. No abscess.      Nails: There is no clubbing.      Comments: Ulcer of left 3rd toe is smaller.  Wound bed is granular.  No significant redness or swelling.  No exposed bone.  Recurring ulcer right plantar foot with epidermolysis laterally.  Serous drainage noted.  No keyla pus.  No deep probing.  Redness, edema, and warmth in right foot.  No obvious signs of abscess.  See wound assessment and photo.   Neurological:      General: No focal deficit present.      Mental Status: He is alert and oriented to person, place, and time.      Cranial Nerves: No cranial nerve deficit.      Sensory: Sensory deficit present.      Motor: No weakness.      Coordination: Coordination normal.   Psychiatric:         Mood and Affect: Mood normal.         Behavior: Behavior normal.         Thought Content: Thought content normal.         Judgment: Judgment normal.           Wound 12/16/24 Diabetic Ulcer Foot Right;Plantar (Active)   Wound Image Images linked 02/17/25 1029   Wound Description Unable to assess 02/17/25 0954   [place]-wound Assessment Intact 02/17/25 0954   Wound Length (cm) 0.2 cm 02/17/25 0954   Wound Width (cm) 0.2 cm 02/17/25 0954   Wound Depth (cm) 0.2 cm 02/17/25 0954   Wound Surface Area (cm^2) 0.04 cm^2 02/17/25 0954   Wound Volume (cm^3) 0.008 cm^3 02/17/25 0954   Calculated Wound Volume (cm^3) 0.01 cm^3 02/17/25 0954   Change in Wound Size % 95.45 02/17/25 0954   Number of underminings 1 02/17/25 0954   Undermining 1 3.1 02/17/25 0954   Undermining 1 is depth extending from circumfrential, deepest @ 3 o'clock, (toes 6 o'clock) 02/17/25 0954   Drainage Amount Moderate 02/17/25 0954   Drainage Description Serosanguineous;Tan 02/17/25 0954   Non-staged Wound Description Full thickness 02/17/25 0954   Dressing Status Removed 02/17/25 0954       Wound 12/30/24 Diabetic Ulcer Toe D3, third  Anterior;Left (Active)   Wound Image Images linked 02/17/25 0955   Wound Description [place];Pale 02/17/25 0955   Lavern-wound Assessment Intact 02/17/25 0955   Wound Length (cm) 0.5 cm 02/17/25 0955   Wound Width (cm) 0.7 cm 02/17/25 0955   Wound Depth (cm) 0.2 cm 02/17/25 0955   Wound Surface Area (cm^2) 0.35 cm^2 02/17/25 0955   Wound Volume (cm^3) 0.07 cm^3 02/17/25 0955   Calculated Wound Volume (cm^3) 0.07 cm^3 02/17/25 0955   Change in Wound Size % -16.67 02/17/25 0955   Number of underminings 1 02/17/25 0955   Undermining 1 0.2 02/17/25 0955   Undermining 1 is depth extending from 11 o'clock to 2 o'clock, deepest @ 1 02/17/25 0955   Drainage Amount Moderate 02/17/25 0955   Drainage Description Serous;Yellow 02/17/25 0955   Dressing Status Removed 02/17/25 0955                             Lab Results   Component Value Date    HGBA1C 7.7 (H) 01/16/2025       Wound Instructions:  Orders Placed This Encounter   Procedures    Wound cleansing and dressings     Left third toe wound:     Apply acticoat 7 to wound bed  Cover with calcium alginate and dry gauze.  Secure with rolled gauze and tape.   Apply bulky dressing to Left foot  Tubifast over all     Change dressing weekly at the wound center.   Wear sugical shoe     Limit your walking for the next week      Follow up at the wound center in one week.     Standing Status:   Future     Expiration Date:   2/24/2025    Wound cleansing and dressings Diabetic Ulcer Right;Plantar Foot     Dr. Ponce is concerned there may be an infection in your right foot.    He would like you to go to the hospital for IV antibiotics.  He is reaching out to the hospital staff to get a bed for you.      We applied Acticoat 3 to wound bed today.  Covered with Abd pad.  Secured with kerlix & tape.     Standing Status:   Future     Expiration Date:   2/24/2025    Wound cleansing and dressings Diabetic Ulcer Anterior;Left Toe D3, third     Apply Acticoat 3 to the wound.  Cover with gauze.  Secure  with tape.     Standing Status:   Future     Expiration Date:   2/24/2025    Debridement Diabetic Ulcer Right;Plantar Foot     This order was created via procedure documentation    Wound Procedure Treatment     This order was created via procedure documentation    Wound culture and Gram stain     Release to patient through Mychart:   Immediate    Transfer to other facility     Pt can go to ED as private pt for admission so treatment can be started right away if there is no bed available.     Call back Phone Number::   [phone]     Request Type::   Transfer to [facility] [[id number]]     Hospital Area::   [facility] [[id number]]     Reason for Transfer::   Direct Admission [18]     Service::   Podiatry [[id number]]     Level of Care::   Med Surg [16]             Satnam Ponce DPM

## 2025-02-17 NOTE — PROGRESS NOTES
Wound Procedure Treatment    Performed by: Stephanie White RN  Authorized by: Satnam Ponce DPM    Associated wounds:   Wound 12/16/24 Diabetic Ulcer Foot Right;Plantar  Wound 12/30/24 Diabetic Ulcer Toe D3, third Anterior;Left  Wound cleansed with:  Wound aggrssively cleansed with NSS and gauze  Applied primary dressing:  Acticoat and Calcium alginate  Applied secondary dressing:  Gauze  Dressing secured with:  Kacy and Tape  Offloading device appllied:  Surgical shoe

## 2025-02-17 NOTE — ASSESSMENT & PLAN NOTE
On diltiazem 120mg q12h, lisinopril 40mg daily, and Toprol BID at home. Monitors BP at home, but is unsure of typical numbers.  Continue home meds  Monitor vitals

## 2025-02-17 NOTE — ASSESSMENT & PLAN NOTE
"Lab Results   Component Value Date    HGBA1C 7.7 (H) 01/16/2025       No results for input(s): \"POCGLU\" in the last 72 hours.    Blood Sugar Average: Last 72 hrs:    HgbA1c 7.7 on 1/16/25. Uses transdermal continuous monitor. On insulin Lispro 20U TID, insulin glargine 30U HS, and metformin 750mg BID at home.  Hold home metformin  Lantus 28 units  Lispro 11 units with meals to start with, will likely increase to 15  Sliding scale insulin  Monitor glucose  "

## 2025-02-17 NOTE — TELEPHONE ENCOUNTER
I called patient to cancel appointment for today with Dr Torres   -- Dr Torres out of office today -- patient's wife said Won is going to the hospital to be admitted for cellulitis    she will call back in a few days to schedule an appointment with Dr Torres

## 2025-02-17 NOTE — H&P
Podiatry - H&P  Won Gaitan Jr. 74 y.o. male MRN: [id number]  Unit/Bed#: [facility] Encounter: [id number]  Admission Date: 02/17/25    ASSESSMENT:    Won Gaitan Jr. is a 74 y.o. male with:    Right plantar foot DFU with cellulitis, Parks 1  Left foot dorsal third toe DFU, Parks 1  PAD  History of right transmetatarsal amputation with free flap  History of left partial fourth ray resection  Type 2 diabetes with last A1c 7.7% collected on 1/16/2025  Atrial fibrillation  Hypertension    PLAN:    Patient to be admitted under podiatry service of Dr. Montelongo for management of right foot cellulitis and diabetic foot ulceration.  Plan for IV ancef and local wound care in this hospital stay.  Wound care order is placed.  Patient is currently afebrile.  Will monitor labs and vitals while in-patient  Follow-up wound culture collected in the office visit earlier today.  Will tailor the antibiotic as needed.  Ordered right foot radiographs, will follow up results.  Consult placed to internal medicine, appreciate recommendations and medical management.  Patient will be weightbearing as tolerated to the right heel in surgical shoe  Continuation of all home medications aside from oral hyperglycemics.   Plan discussed with Dr. Ponce      Antibiotics started: Ancef  Pharmacologic VTE Prophylaxis: Eliquis  Mechanical VTE Prophylaxis: sequential compression device   Weightbearing status: Weightbearing as tolerated to the right heel in surgical shoe      Disposition:  Patient requires >2 midnight stay for IV antibiotic.      SUBJECTIVE:    History of Present Illness:    Won Gaitan Jr. is a 74 y.o. male who is being admitted 2/17/2025 due to right foot recurrent diabetic foot ulcer with cellulitis. Patient has a past medical history of type 2 diabetes, hypertension, atrial fibrillation, history of right transmetatarsal amputation with free flap, history of left partial fourth ray resection. Patient reports that he has a recurrent right  "plantar foot diabetic ulcer with infection.  Patient healed this right foot plantar ulcer and office visit of 2/10/2025 and was allowed to weightbearing as tolerated to the right foot.  However, patient has been doing heavy housework including shoveling snow and Friday, 2/14/2025.  He found the plantar foot bleeding after that and in the following 2 days the right foot and leg progressively showed worsening erythema and edema with shooting and burning pain to the right leg.  Patient reports serous drainage.  Patient reported to the podiatric wound care office this morning 2/17/2025 I was immediately asked to be admitted to the hospital for IV antibiotic.  In the office the wound was debrided and wound culture was collected.  Patient also has a stable left dorsal third toe scabbed wound.  He denies any other pedal concerns.  He denies any systemic signs of infection, including headache, dizziness, chills, fever, short of breath, chest pain, diarrhea, stomachache.    Review of Systems:    Constitutional: Negative.    HENT: Negative.    Eyes: Negative.    Respiratory: Negative.    Cardiovascular: Negative.    Gastrointestinal: Negative.    Musculoskeletal: Right foot TMA, left foot partial fourth ray resection.  Skin: Right plantar foot diabetic foot ulcer, left dorsal second toe scabbed diabetic foot ulcer  Neurological: Diabetic polyneuropathy  Psych: Negative.     Past Medical and Surgical History:     Past Medical History:   Diagnosis Date    Arthritis     Atrial fibrillation (HCC)     Diagnosed 11/2018    Benign prostate hyperplasia 04/2002    Cellulitis     right lower leg     Colon polyp 2006    Diabetes mellitus (HCC)     Hearing aid worn     bilat    Hearing loss     90% loss left ear and 40% right ear    History of clubfoot     \"since birth\"    History of pneumonia     History of TIA (transient ischemic attack) 04/25/2017 11/30/18 pt denies    Hyperlipidemia 5/15/2014    Hypertension     Infectious viral " "hepatitis     \"cant remember type\"    Irregular heart beat     Afib    Neuropathy     both feet    Osteomyelitis (HCC)     right great toe    Pneumonia     Right middle lobe pulmonary nodule 11/6/2018    Seasickness     Teeth missing     Type 2 diabetes mellitus with diabetic neuropathy (HCC) 11/6/2018    Wears glasses     reading    Wound, open     bottom of right foot       Past Surgical History:   Procedure Laterality Date    BUNIONECTOMY Right 12/4/2018    Procedure: 5TH METATARSAL BONE PARTIAL RESECTION, FULL THICKNESS DEBRIDEMENT OF DIABETIC ULCER;  Surgeon: Shala Flynn DPM;  [facility];  Service: Podiatry    CLOSURE DELAYED PRIMARY Left 10/30/2023    Procedure: CLOSURE DELAYED PRIMARY left foot;  Surgeon: Minh Urena DPM;  [facility];  Service: Podiatry    CLUB FOOT RELEASE Bilateral     COLONOSCOPY      COMPLEX WOUND CLOSURE TO EXTREMITY  4/27/2021    Procedure: COMPLEX WOUND CLOSURE TO EXTREMITY: RIGHT FOOT;  Surgeon: Niraj Bojorquez MD;  [facility];  Service: Plastics    EXTERNAL FIXATOR APPLICATION Right 2/12/2021    Procedure: Application multiplane external fixation;  Surgeon: Harry Zamudio DPM;  [facility];  Service: Podiatry    FOOT HARDWARE REMOVAL Right 2/17/2021    Procedure: REMOVAL EXTERNAL FIXATOR;  Surgeon: Harry Zamudio DPM;  [facility];  Service: Podiatry    FOREIGN BODY REMOVAL Right 4/27/2021    Procedure: REMOVAL FOREIGN BODY EXTREMITY: RIGHT FOOT;  Surgeon: Niraj Bojorquez MD;  [facility];  Service: Plastics    INCISION AND DRAINAGE OF WOUND Right 2/17/2021    Procedure: INCISION AND DRAINAGE (I&D) EXTREMITY;  Surgeon: Harry Zamudio DPM;  [facility];  Service: Podiatry    INCISION AND DRAINAGE OF WOUND Left 10/26/2023    Procedure: INCISION AND DRAINAGE (I&D) EXTREMITY; WASHOUT; REMOVAL OF FOREIGN BODY;  Surgeon: Satnam Ponce DPM;  [facility];  Service: Podiatry    ORIF FOOT " FRACTURE Right 3/4/2021    Procedure: VAC PLACEMENT; SCREW FIXATION RIGHT FOOT FUSION;  Surgeon: Harry Zamudio DPM;  [facility];  Service: Podiatry    NV AMPUTATION FOOT TRANSMETARSAL Right 2/22/2021    Procedure: AMPUTATION TRANSMETATARSAL (TMA), REMOVAL NAIL IM T2 ICF HARDWARE;  Surgeon: Harry Zamudio DPM;  [facility];  Service: Podiatry    NV AMPUTATION METATARSAL W/TOE SINGLE Left 11/29/2023    Procedure: LEFT FOURTH RAY RESECTION FOOT;  Surgeon: Harry Zamudio DPM;  [facility];  Service: Podiatry    NV ARTHRD MIDTARSL/TARS MLT/TRANSVRS W/OSTEOT Right 2/12/2021    Procedure: foot ARTHRODESIS/FUSION;  Surgeon: Harry Zamudio DPM;  [facility];  Service: Podiatry    NV DEBRIDEMENT BONE 1ST 20 SQ CM/< Right 12/2/2021    Procedure: DEBRIDEMENT OF TARSAL BONES WITH ANITBIODIC BEADS;  Surgeon: Harry Zamudio DPM;  [facility];  Service: Podiatry    NV LNGTH/SHRT TENDON LEG/ANKLE 1 TENDON SPX Right 2/12/2021    Procedure: LENGTHEN TIBIAL TENDON, TRANS HEEL CORD;  Surgeon: Harry Zamudio DPM;  [facility];  Service: Podiatry    NV MUSC MYOCUTANEOUS/FASCIOCUTANEOUS FLAP TRUNK Right 4/12/2021    Procedure: RECONSTRUCTION MICROSURGICAL W/ FREE FLAP;  Surgeon: Niraj Bojorquez MD;  [facility];  Service: Plastics    NV MUSC MYOCUTANEOUS/FASCIOCUTANEOUS FLAP TRUNK Right 4/12/2021    Procedure: TAKEBACK RECONSTRUCTION MICROSURGICAL W/ FREE FLAP;  Surgeon: Niraj Bojorquez MD;  [facility];  Service: Plastics    NV MUSC MYOCUTANEOUS/FASCIOCUTANEOUS FLAP TRUNK Right 4/13/2021    Procedure: RECONSTRUCTION MICROSURGICAL W/ FREE FLAP, RE EXPLORATION, MICRO VASCULAR REVISION;  Surgeon: Niraj Bojorquez MD;  [facility];  Service: Plastics    NV OSTC PRTL EXOSTC/CONDYLC METAR HEAD Right 12/29/2020    Procedure: EXCISION EXOSTOSIS;  Surgeon: Harry Zamudio DPM;  [facility];  Service: Podiatry    NV  SECONDARY CLOSURE SURG WOUND/DEHSN XTNSV/COMP Right 2020    Procedure: PRIMARY DELAYED CLOSURE;  Surgeon: Harry Zamudio DPM;  [facility];  Service: Podiatry    TOE AMPUTATION Right     partial great toe    TONSILLECTOMY      VAC DRESSING APPLICATION Right 3/1/2021    Procedure: APPLICATION VAC DRESSING EXTREMITY;  Surgeon: Niraj Bojorquez MD;  [facility];  Service: Plastics    VASECTOMY  1992    WOUND DEBRIDEMENT Right 3/1/2021    Procedure: DEBRIDEMENT LOWER EXTREMITY (WASH OUT);  Surgeon: Niraj Bojorquez MD;  [facility];  Service: Plastics    WOUND DEBRIDEMENT Right 2021    Procedure: DEBRIDEMENT RIGHT FOOT;  Surgeon: Niraj Bojorquez MD;  [facility];  Service: Plastics    WOUND DEBRIDEMENT Right 2021    Procedure: DEBRIDEMENT LOWER EXTREMITY (WASH OUT);  Surgeon: Niraj Bojorquez MD;  [facility];  Service: Plastics    WOUND DEBRIDEMENT Right 2021    Procedure: DEBRIDEMENT LOWER EXTREMITY (WASH OUT): RIGHT FOOT;  Surgeon: Niraj Bojorquez MD;  [facility];  Service: Plastics       Meds/Allergies:    Not in a hospital admission.    Allergies   Allergen Reactions    Simvastatin Myalgia    Itraconazole Other (See Comments)     Pt denies knowledge of allergy..        Social History:    Social History     Marital Status: /Civil Union    Substance Use History:   Social History     Substance and Sexual Activity   Alcohol Use Yes    Comment: occasionally     Social History     Tobacco Use   Smoking Status Former    Current packs/day: 0.00    Types: Cigarettes    Quit date: 1988    Years since quittin.3    Passive exposure: Past   Smokeless Tobacco Former   Tobacco Comments    exposure to passive smoke     Social History     Substance and Sexual Activity   Drug Use Not Currently       Family History:    Family History   Problem Relation Age of Onset    Diabetes Father         mellitus          OBJECTIVE:    First Vitals:   Blood Pressure: 108/85 (02/17/25 1439)  Pulse: 94 (02/17/25 1439)  Temperature: 97.8 °F (36.6 °C) (02/17/25 1439)  Temp Source: Temporal (02/17/25 1439)  Respirations: 18 (02/17/25 1439)  SpO2: 99 % (02/17/25 1439)    Current Vitals:   Blood Pressure: 108/85 (02/17/25 1439)  Pulse: 94 (02/17/25 1439)  Temperature: 97.8 °F (36.6 °C) (02/17/25 1439)  Temp Source: Temporal (02/17/25 1439)  Respirations: 18 (02/17/25 1439)  SpO2: 99 % (02/17/25 1439)    Physical Exam    General Appearance: Alert, cooperative, no distress.  HEENT: Head normocephalic, atraumatic, without obvious abnormality.  Heart: Normal rate and rhythm.  No murmurs or gallops noted.  Lungs: Non-labored breathing. No respiratory distress.  No wheezing, rhonchi, or rales.  Abdomen:  Soft, nontender, without distension.  Psychiatric: AAOx3  Lower Extremity:  Vascular:   Pedal pulses are absent. CRT < 3 seconds at the digits. +2/4 edema noted at right lower extremities. Pedal hair is absent. Skin temperature is warm to the right foot.    Musculoskeletal:  MMT is 5/5 in all muscle compartments bilaterally.  No Pain on palpation of bilateral feet wound.  S/p right TMA with skin flap and left partial fourth ray resection    Dermatological:  Wound #: 1  Location: Right plantar medial foot  Length 4 cm: Width 2 cm: Depth 0.1cm:   Deepest Tissue Noted in Base: Subcutaneous  Probe to Bone: No  Peripheral Skin Description: Attached  Granulation: 100% Fibrotic Tissue: 0% Necrotic Tissue: 0%   Drainage Amount: minimal, serous  Signs of Infection: Yes, negative probe to bone, negative crepitus, positive erythema, edema, negative purulence drainage, negative proximal tracking erythema    Wound #: 2  Location: Left dorsal third toe  Length 0.6cm: Width 0.5cm: Depth scabbed:   Deepest Tissue Noted in Base: Scabbed  Probe to Bone: No  Peripheral Skin Description: Attached  Granulation: 0% Fibrotic Tissue: 100% scab, necrotic Tissue:  0%   Drainage Amount: None  Signs of Infection: No    Neurological:  Gross sensation is absent. Protective sensation is absent. Patient Reports numbness and/or paresthesias.    Clinical Images 02/17/25:                Additional Data:    Lab Results:   No visits with results within 1 Day(s) from this visit.   Latest known visit with results is:   Appointment on 01/16/2025   Component Date Value    Creatinine, Ur 01/16/2025 103.8     Albumin,U,Random 01/16/2025 45.3 (H)     Albumin Creat Ratio 01/16/2025 44 (H)     Sodium 01/16/2025 140     Potassium 01/16/2025 4.4     Chloride 01/16/2025 103     CO2 01/16/2025 29     ANION GAP 01/16/2025 8     BUN 01/16/2025 24     Creatinine 01/16/2025 1.05     Glucose 01/16/2025 141 (H)     Calcium 01/16/2025 9.0     AST 01/16/2025 14     ALT 01/16/2025 17     Alkaline Phosphatase 01/16/2025 56     Total Protein 01/16/2025 7.5     Albumin 01/16/2025 4.4     Total Bilirubin 01/16/2025 0.51     eGFR 01/16/2025 70     Hemoglobin A1C 01/16/2025 7.7 (H)     EAG 01/16/2025 174     Testosterone, Free 01/16/2025 11.5     TESTOSTERONE TOTAL 01/16/2025 439        Cultures:            Imaging: I have personally reviewed pertinent reports in PACS  No results found.    EKG, Pathology, and Other Studies: I have personally reviewed pertinent reports.      Code Status: Prior

## 2025-02-17 NOTE — CONSULTS
VTE Pharmacologic Prophylaxis: Eliquis  VTE Mechanical Prophylaxis: sequential compression device    Reason for Admission / Principal Problem: Right foot wound infection requiring IV antibiotics  Reason for Consult: Management of DM and HTN      ASSESSMENT & PLAN   Hyperlipidemia  On atorvastatin 40mg at home.  Continue statin    Type 2 diabetes mellitus with diabetic neuropathy  HgbA1c 7.7 on 1/16/25. Uses transdermal continuous monitor. On insulin Lispro 20U TID, insulin glargine 30U HS, and metformin 750mg BID at home.  Hold home metformin  Lantus 34 units  Lispro 11 units with meals  Sliding scale insulin  Monitor glucose    Essential hypertension  On diltiazem 120mg q12h, lisinopril 40mg daily, and Toprol BID at home. Monitors BP at home, but is unsure of typical numbers.  Continue home meds  Monitor vitals    Cellulitis of right lower extremity  R foot and lower leg swollen and discolored, consistent with cellulitis.  Management per Podiatry team    Atrial fibrillation  On Eliquis 5mg at home, scheduled to switch today 2/17 to Plavix due to financial considerations. Also on diltiazem 120mg q12h and Toprol BID at home.  Continue Eliquis 5mg  Continue home diltiazem and Toprol  Monitor for cardiac sx or changes    Ulcer of right foot with fat layer exposed  Ulcer on R foot that had been healing but now appears infected.  Management per Podiatry team    Ambulatory dysfunction  Uses wheelchair at baseline. Hx of diabetic neuropathy.  Fall precautions  Management per Podiatry team    Benign prostatic hyperplasia with lower urinary tract symptoms  On Flomax 0.4mg at home. Denies sx currently.  Continue home meds    Anxiety and depression  On Lexapro 10mg at home.  Continue home meds      SUBJECTIVE   Won POWELL Arnie Wang is a 74 y.o. gentleman, with PMH of HTN, a-fib, T2DM, BPH, R lower leg cellulitis, R foot ulcer, anxiety and depression, who presented to ED today with swelling and discoloration of his R foot and lower  leg.    Pt noticed swelling and discoloration of R foot on Friday. Progressed to involve R lower leg by Saturday. Sock became stained with small amount of blood today. He had appt with outpatient Podiatrist today, who noted his foot to be infected and recommended he go to the ED for management. To note, he had received special boots to wear on feet while ulcers healed, but was unable to fit boot on R foot due to transmetatarsal amputation of the foot. He was therefore using regular shoes with laces loosened.    Upon arrival to ED, has been receiving treatment from Podiatry team for foot. The team consulted Medicine team for management of DM and HTN. Pt reports compliance with all medications, monitors BP at home but is unsure of baseline numbers, and stays under glucose 180 approximately half the time, per his continuous glucose monitor. Currently denies pain. Has been experiencing URI for past few days, partially relieved with Mucinex. Rest of ROS as below.    REVIEW OF SYSTEMS   Review of Systems   Constitutional:  Negative for fever.   HENT:  Positive for congestion, hearing loss, rhinorrhea and sneezing. Negative for nosebleeds and sore throat.         URI for past few days   Respiratory:  Positive for cough. Negative for shortness of breath.         URI for past few days; Cough is productive, non-bloody   Cardiovascular:  Positive for leg swelling. Negative for chest pain.        R foot and lower leg - see HPI   Gastrointestinal:  Positive for diarrhea. Negative for abdominal pain, anal bleeding, blood in stool, constipation, nausea, rectal pain and vomiting.        Diarrhea is associated with URI in past few days, non-bloody   Genitourinary:  Negative for dysuria and hematuria.   Musculoskeletal:  Positive for arthralgias.        Due to arthritis   Skin:  Positive for color change and wound.        R foot and lower leg - see HPI   Neurological:  Negative for dizziness and headaches.        HA a few days ago  with URI     OBJECTIVE     Vitals:    25 1439   BP: 108/85   BP Location: Left arm   Pulse: 94   Resp: 18   Temp: 97.8 °F (36.6 °C)   TempSrc: Temporal   SpO2: 99%      Temperature:   Temp (24hrs), Av.7 °F (36.5 °C), Min:97.6 °F (36.4 °C), Max:97.8 °F (36.6 °C)    Temperature: 97.8 °F (36.6 °C)  Intake & Output:  I/O       None          Weights:        There is no height or weight on file to calculate BMI.  Weight (last 2 days)       None          Physical Exam  Constitutional:       General: He is not in acute distress.     Appearance: He is obese. He is not ill-appearing, toxic-appearing or diaphoretic.   HENT:      Head: Normocephalic and atraumatic.      Nose: Congestion present. No rhinorrhea.      Mouth/Throat:      Mouth: Mucous membranes are moist.      Pharynx: Oropharynx is clear.   Eyes:      General:         Right eye: No discharge.         Left eye: No discharge.      Extraocular Movements: Extraocular movements intact.      Conjunctiva/sclera: Conjunctivae normal.   Cardiovascular:      Rate and Rhythm: Normal rate and regular rhythm.      Heart sounds: Normal heart sounds. No murmur heard.     No friction rub. No gallop.      Comments: Unable to access R lower extremity distal pulses due to bandages  Pulmonary:      Effort: Pulmonary effort is normal. No respiratory distress.      Breath sounds: Normal breath sounds. No stridor. No wheezing, rhonchi or rales.   Abdominal:      General: Abdomen is flat. Bowel sounds are normal. There is no distension.      Palpations: Abdomen is soft. There is no mass.      Tenderness: There is no abdominal tenderness. There is no guarding.   Musculoskeletal:         General: Swelling and deformity present. No tenderness.      Cervical back: Neck supple.      Comments: Swelling of b/l feet and lower legs, minimal in L and more prominent in R; R foot s/p transmetatarsal amputation, L foot s/p toe amputation   Skin:     General: Skin is warm and dry.       "Coloration: Skin is not jaundiced or pale.      Findings: Erythema present.      Comments: Erythema circumferentially on R lower leg, R foot bandaged by Podiatry   Neurological:      Mental Status: He is alert and oriented to person, place, and time.      Cranial Nerves: No cranial nerve deficit.      Sensory: Sensory deficit present.      Comments: Decreased sensation around b/l ankles, loss of sensation on b/l feet   Psychiatric:         Mood and Affect: Mood normal.         Behavior: Behavior normal.       PAST MEDICAL HISTORY     Past Medical History:   Diagnosis Date    Arthritis     Atrial fibrillation (HCC)     Diagnosed 11/2018    Benign prostate hyperplasia 04/2002    Cellulitis     right lower leg     Colon polyp 2006    Diabetes mellitus (HCC)     Hearing aid worn     bilat    Hearing loss     90% loss left ear and 40% right ear    History of clubfoot     \"since birth\"    History of pneumonia     History of TIA (transient ischemic attack) 04/25/2017 11/30/18 pt denies    Hyperlipidemia 5/15/2014    Hypertension     Infectious viral hepatitis     \"cant remember type\"    Irregular heart beat     Afib    Neuropathy     both feet    Osteomyelitis (HCC)     right great toe    Pneumonia     Right middle lobe pulmonary nodule 11/6/2018    Seasickness     Teeth missing     Type 2 diabetes mellitus with diabetic neuropathy (HCC) 11/6/2018    Wears glasses     reading    Wound, open     bottom of right foot     PAST SURGICAL HISTORY     Past Surgical History:   Procedure Laterality Date    BUNIONECTOMY Right 12/4/2018    Procedure: 5TH METATARSAL BONE PARTIAL RESECTION, FULL THICKNESS DEBRIDEMENT OF DIABETIC ULCER;  Surgeon: Shala Flynn DPM;  [facility];  Service: Podiatry    CLOSURE DELAYED PRIMARY Left 10/30/2023    Procedure: CLOSURE DELAYED PRIMARY left foot;  Surgeon: Minh Urena DPM;  Location:  [facility] OR;  Service: Podiatry    CLUB FOOT RELEASE Bilateral     COLONOSCOPY      COMPLEX WOUND " CLOSURE TO EXTREMITY  4/27/2021    Procedure: COMPLEX WOUND CLOSURE TO EXTREMITY: RIGHT FOOT;  Surgeon: Niraj Bojorquez MD;  [facility];  Service: Plastics    EXTERNAL FIXATOR APPLICATION Right 2/12/2021    Procedure: Application multiplane external fixation;  Surgeon: Harry Zamudio DPM;  [facility];  Service: Podiatry    FOOT HARDWARE REMOVAL Right 2/17/2021    Procedure: REMOVAL EXTERNAL FIXATOR;  Surgeon: Harry Zamudio DPM;  [facility];  Service: Podiatry    FOREIGN BODY REMOVAL Right 4/27/2021    Procedure: REMOVAL FOREIGN BODY EXTREMITY: RIGHT FOOT;  Surgeon: Niraj Bojorquez MD;  [facility];  Service: Plastics    INCISION AND DRAINAGE OF WOUND Right 2/17/2021    Procedure: INCISION AND DRAINAGE (I&D) EXTREMITY;  Surgeon: Harry Zamudio DPM;  [facility];  Service: Podiatry    INCISION AND DRAINAGE OF WOUND Left 10/26/2023    Procedure: INCISION AND DRAINAGE (I&D) EXTREMITY; WASHOUT; REMOVAL OF FOREIGN BODY;  Surgeon: Satnam Ponce DPM;  [facility];  Service: Podiatry    ORIF FOOT FRACTURE Right 3/4/2021    Procedure: VAC PLACEMENT; SCREW FIXATION RIGHT FOOT FUSION;  Surgeon: Harry Zamudio DPM;  [facility];  Service: Podiatry    TN AMPUTATION FOOT TRANSMETARSAL Right 2/22/2021    Procedure: AMPUTATION TRANSMETATARSAL (TMA), REMOVAL NAIL IM T2 ICF HARDWARE;  Surgeon: Harry Zamudio DPM;  [facility];  Service: Podiatry    TN AMPUTATION METATARSAL W/TOE SINGLE Left 11/29/2023    Procedure: LEFT FOURTH RAY RESECTION FOOT;  Surgeon: Harry Zamudio DPM;  [facility];  Service: Podiatry    TN ARTHRD MIDTARSL/TARS MLT/TRANSVRS W/OSTEOT Right 2/12/2021    Procedure: foot ARTHRODESIS/FUSION;  Surgeon: Harry Zamudio DPM;  [facility];  Service: Podiatry    TN DEBRIDEMENT BONE 1ST 20 SQ CM/< Right 12/2/2021    Procedure: DEBRIDEMENT OF TARSAL BONES WITH ANITBIODIC BEADS;  Surgeon: Harry Zamudio  DONOVAN;  [facility];  Service: Podiatry    KS LNGTH/SHRT TENDON LEG/ANKLE 1 TENDON SPX Right 2/12/2021    Procedure: LENGTHEN TIBIAL TENDON, TRANS HEEL CORD;  Surgeon: Harry Zamudio DPM;  [facility];  Service: Podiatry    KS MUSC MYOCUTANEOUS/FASCIOCUTANEOUS FLAP TRUNK Right 4/12/2021    Procedure: RECONSTRUCTION MICROSURGICAL W/ FREE FLAP;  Surgeon: Niraj Bojorquez MD;  [facility];  Service: Plastics    KS MUSC MYOCUTANEOUS/FASCIOCUTANEOUS FLAP TRUNK Right 4/12/2021    Procedure: TAKEBACK RECONSTRUCTION MICROSURGICAL W/ FREE FLAP;  Surgeon: Niraj Bojorquez MD;  [facility];  Service: Plastics    KS MUSC MYOCUTANEOUS/FASCIOCUTANEOUS FLAP TRUNK Right 4/13/2021    Procedure: RECONSTRUCTION MICROSURGICAL W/ FREE FLAP, RE EXPLORATION, MICRO VASCULAR REVISION;  Surgeon: Niraj Bojorquez MD;  [facility];  Service: Plastics    KS OSTC PRTL EXOSTC/CONDYLC METAR HEAD Right 12/29/2020    Procedure: EXCISION EXOSTOSIS;  Surgeon: Harry Zamudio DPM;  [facility];  Service: Podiatry    KS SECONDARY CLOSURE SURG WOUND/DEHSN XTNSV/COMP Right 12/29/2020    Procedure: PRIMARY DELAYED CLOSURE;  Surgeon: Haryr Zamudio DPM;  [facility];  Service: Podiatry    TOE AMPUTATION Right     partial great toe    TONSILLECTOMY      VAC DRESSING APPLICATION Right 3/1/2021    Procedure: APPLICATION VAC DRESSING EXTREMITY;  Surgeon: Niraj Bojorquez MD;  [facility];  Service: Plastics    VASECTOMY  1992    WOUND DEBRIDEMENT Right 3/1/2021    Procedure: DEBRIDEMENT LOWER EXTREMITY (WASH OUT);  Surgeon: Niraj Bojorquez MD;  [facility];  Service: Plastics    WOUND DEBRIDEMENT Right 4/7/2021    Procedure: DEBRIDEMENT RIGHT FOOT;  Surgeon: Niraj Bojorquez MD;  [facility];  Service: Plastics    WOUND DEBRIDEMENT Right 4/12/2021    Procedure: DEBRIDEMENT LOWER EXTREMITY (WASH OUT);  Surgeon: Niraj BAINS  "Jace Bojorquez MD;  [facility];  Service: Plastics    WOUND DEBRIDEMENT Right 2021    Procedure: DEBRIDEMENT LOWER EXTREMITY (WASH OUT): RIGHT FOOT;  Surgeon: Niraj Bojorquez MD;  [facility];  Service: Plastics     SOCIAL & FAMILY HISTORY     Social History     Substance and Sexual Activity   Alcohol Use Yes    Comment: occasionally       Social History     Substance and Sexual Activity   Drug Use Not Currently     Social History     Tobacco Use   Smoking Status Former    Current packs/day: 0.00    Types: Cigarettes    Quit date: 1988    Years since quittin.3    Passive exposure: Past   Smokeless Tobacco Former   Tobacco Comments    exposure to passive smoke     Family History   Problem Relation Age of Onset    Diabetes Father         mellitus     LABORATORY DATA     Labs: I have personally reviewed pertinent reports.             Invalid input(s): \"LABALBU\"                       Micro:  Lab Results   Component Value Date    BLOODCX No Growth After 5 Days. 2023    BLOODCX No Growth After 5 Days. 2023    BLOODCX No Growth After 5 Days. 10/24/2023    URINECX 80,000-89,000 cfu/ml Staphylococcus epidermidis (A) 10/24/2023    URINECX >100,000 cfu/ml Staphylococcus coagulase negative (A) 2022    URINECX No Growth <1000 cfu/mL 2019    WOUNDCULT 2+ Growth of Staphylococcus aureus (A) 2023    WOUNDCULT 2+ Growth of 2023    WOUNDCULT 1+ Growth of Staphylococcus aureus (A) 10/25/2023    WOUNDCULT 1+ Growth of 10/25/2023     IMAGING & DIAGNOSTIC TESTS     Imaging: I have personally reviewed pertinent reports.    No results found.  EKG, Pathology, and Other Studies: I have personally reviewed pertinent reports.     ALLERGIES     Allergies   Allergen Reactions    Simvastatin Myalgia    Itraconazole Other (See Comments)     Pt denies knowledge of allergy..      MEDICATIONS PRIOR TO ARRIVAL     Prior to Admission medications    Medication Sig Start " Date End Date Taking? Authorizing Provider   acetaminophen (Mapap Arthritis Pain) 650 mg CR tablet Take 1 tablet (650 mg total) by mouth 2 (two) times a day 5/18/21  Yes KIRIT Daniels   atorvastatin (LIPITOR) 40 mg tablet Take 1 tablet (40 mg total) by mouth daily 2/3/25  Yes Pieter Torres DO   clopidogrel (Plavix) 75 mg tablet Take 1 tablet (75 mg total) by mouth daily 2/6/25  Yes Pieter Torres DO   Continuous Glucose Sensor (FreeStyle Taylor 3 Sensor) [facility] Use 1 each every 14 (fourteen) days 7/22/24  Yes Pieter Torres DO   diltiazem (CARDIZEM) 120 MG tablet Take 1 tablet (120 mg total) by mouth every 12 (twelve) hours 2/3/25  Yes Pieter Torres DO   Eliquis 5 MG Take 1 tablet (5 mg total) by mouth 2 (two) times a day 2/3/25  Yes Pieter Torres DO   escitalopram (LEXAPRO) 10 mg tablet Take 1 tablet (10 mg total) by mouth daily 2/3/25  Yes Pieter Torres DO   Insulin Glargine Solostar (Lantus SoloStar) 100 UNIT/ML SOPN Inject 0.3 mL (30 Units total) under the skin daily at bedtime 2/10/25  Yes Pieter Torres DO   insulin lispro (HumaLOG) 100 units/mL injection pen Inject up to 20 Units under the skin 3 (three) times a day with meals 11/2/24  Yes Pieter Torres DO   lisinopril (ZESTRIL) 40 mg tablet Take 1 tablet (40 mg total) by mouth daily 2/3/25  Yes Pieter Trores DO   metFORMIN (GLUCOPHAGE-XR) 750 mg 24 hr tablet Take 1 tablet (750 mg total) by mouth 2 (two) times a day with meals 2/3/25  Yes Pieter Torres DO   tamsulosin (FLOMAX) 0.4 mg Take 1 capsule (0.4 mg total) by mouth daily with dinner 2/3/25  Yes Pieter Torres DO   glucose blood (Accu-Chek Guide) test strip USE DAILY  Patient not taking: Reported on 2/17/2025 8/4/24   Samuel Ornelas MD   Insulin Pen Needle (Easy Comfort Pen Needles) 33G X 5 MM MISC USE AS DIRECTED 8/5/24   Pieter Torres DO   Lancets (onetouch ultrasoft) lancets Use as instructed  Patient not taking: Reported on 2/17/2025 3/8/22   Samuel Ornelas MD   Lancets (onetouch ultrasoft) lancets Use daily  Patient not  "taking: Reported on 2/17/2025 7/31/23   Samuel Ornelas MD   metoprolol succinate (TOPROL-XL) 100 mg 24 hr tablet TAKE 1 TABLET (100 MG TOTAL) BY MOUTH 2 (TWO) TIMES A DAY 8/6/24 2/2/25  Pieter Torres DO   mupirocin (BACTROBAN) 2 % ointment Apply topically 3 (three) times a day  Patient not taking: Reported on 2/17/2025 5/13/24   Bradly Fitzpatrick DPM     MEDICATIONS ADMINISTERED IN LAST 24 HOURS     CURRENT MEDICATIONS     No current facility-administered medications for this encounter.        Portions of the record may have been created with voice recognition software.  Occasional wrong word or \"sound a like\" substitutions may have occurred due to the inherent limitations of voice recognition software.  Read the chart carefully and recognize, using context, where substitutions have occurred.    ==  Hannah Richardson  [facility]  Internal Medicine  "

## 2025-02-17 NOTE — ASSESSMENT & PLAN NOTE
Ulcer on R foot that had been healing but now question of infection.  Management per Podiatry team

## 2025-02-17 NOTE — ASSESSMENT & PLAN NOTE
Uses wheelchair at baseline. Hx of diabetic neuropathy.  Fall precautions  Management per Podiatry team

## 2025-02-18 ENCOUNTER — APPOINTMENT (INPATIENT)
Dept: RADIOLOGY | Facility: HOSPITAL | Age: 75
DRG: 638 | End: 2025-02-18
Payer: COMMERCIAL

## 2025-02-18 LAB
ANION GAP SERPL CALCULATED.3IONS-SCNC: 8 MMOL/L (ref 4–13)
ANION GAP SERPL CALCULATED.3IONS-SCNC: 9 MMOL/L (ref 4–13)
BASOPHILS # BLD AUTO: 0.03 THOUSANDS/ΜL (ref 0–0.1)
BASOPHILS # BLD AUTO: 0.04 THOUSANDS/ΜL (ref 0–0.1)
BASOPHILS NFR BLD AUTO: 0 % (ref 0–1)
BASOPHILS NFR BLD AUTO: 1 % (ref 0–1)
BUN SERPL-MCNC: 16 MG/DL (ref 5–25)
BUN SERPL-MCNC: 17 MG/DL (ref 5–25)
CALCIUM SERPL-MCNC: 8.8 MG/DL (ref 8.4–10.2)
CALCIUM SERPL-MCNC: 9.5 MG/DL (ref 8.4–10.2)
CHLORIDE SERPL-SCNC: 101 MMOL/L (ref 96–108)
CHLORIDE SERPL-SCNC: 105 MMOL/L (ref 96–108)
CO2 SERPL-SCNC: 26 MMOL/L (ref 21–32)
CO2 SERPL-SCNC: 28 MMOL/L (ref 21–32)
CREAT SERPL-MCNC: 0.97 MG/DL (ref 0.6–1.3)
CREAT SERPL-MCNC: 1.14 MG/DL (ref 0.6–1.3)
EOSINOPHIL # BLD AUTO: 0.22 THOUSAND/ΜL (ref 0–0.61)
EOSINOPHIL # BLD AUTO: 0.25 THOUSAND/ΜL (ref 0–0.61)
EOSINOPHIL NFR BLD AUTO: 3 % (ref 0–6)
EOSINOPHIL NFR BLD AUTO: 3 % (ref 0–6)
ERYTHROCYTE [DISTWIDTH] IN BLOOD BY AUTOMATED COUNT: 13.2 % (ref 11.6–15.1)
ERYTHROCYTE [DISTWIDTH] IN BLOOD BY AUTOMATED COUNT: 13.3 % (ref 11.6–15.1)
GFR SERPL CREATININE-BSD FRML MDRD: 63 ML/MIN/1.73SQ M
GFR SERPL CREATININE-BSD FRML MDRD: 77 ML/MIN/1.73SQ M
GLUCOSE SERPL-MCNC: 151 MG/DL (ref 65–140)
GLUCOSE SERPL-MCNC: 160 MG/DL (ref 65–140)
GLUCOSE SERPL-MCNC: 182 MG/DL (ref 65–140)
GLUCOSE SERPL-MCNC: 284 MG/DL (ref 65–140)
GLUCOSE SERPL-MCNC: 80 MG/DL (ref 65–140)
GLUCOSE SERPL-MCNC: 81 MG/DL (ref 65–140)
GLUCOSE SERPL-MCNC: 89 MG/DL (ref 65–140)
HCT VFR BLD AUTO: 42.6 % (ref 36.5–49.3)
HCT VFR BLD AUTO: 44.4 % (ref 36.5–49.3)
HGB BLD-MCNC: 13.6 G/DL (ref 12–17)
HGB BLD-MCNC: 14.2 G/DL (ref 12–17)
IMM GRANULOCYTES # BLD AUTO: 0.01 THOUSAND/UL (ref 0–0.2)
IMM GRANULOCYTES # BLD AUTO: 0.02 THOUSAND/UL (ref 0–0.2)
IMM GRANULOCYTES NFR BLD AUTO: 0 % (ref 0–2)
IMM GRANULOCYTES NFR BLD AUTO: 0 % (ref 0–2)
LYMPHOCYTES # BLD AUTO: 2.36 THOUSANDS/ΜL (ref 0.6–4.47)
LYMPHOCYTES # BLD AUTO: 2.4 THOUSANDS/ΜL (ref 0.6–4.47)
LYMPHOCYTES NFR BLD AUTO: 26 % (ref 14–44)
LYMPHOCYTES NFR BLD AUTO: 28 % (ref 14–44)
MCH RBC QN AUTO: 31.3 PG (ref 26.8–34.3)
MCH RBC QN AUTO: 31.4 PG (ref 26.8–34.3)
MCHC RBC AUTO-ENTMCNC: 31.9 G/DL (ref 31.4–37.4)
MCHC RBC AUTO-ENTMCNC: 32 G/DL (ref 31.4–37.4)
MCV RBC AUTO: 98 FL (ref 82–98)
MCV RBC AUTO: 98 FL (ref 82–98)
MONOCYTES # BLD AUTO: 0.71 THOUSAND/ΜL (ref 0.17–1.22)
MONOCYTES # BLD AUTO: 0.79 THOUSAND/ΜL (ref 0.17–1.22)
MONOCYTES NFR BLD AUTO: 8 % (ref 4–12)
MONOCYTES NFR BLD AUTO: 9 % (ref 4–12)
NEUTROPHILS # BLD AUTO: 5.21 THOUSANDS/ΜL (ref 1.85–7.62)
NEUTROPHILS # BLD AUTO: 5.54 THOUSANDS/ΜL (ref 1.85–7.62)
NEUTS SEG NFR BLD AUTO: 60 % (ref 43–75)
NEUTS SEG NFR BLD AUTO: 62 % (ref 43–75)
NRBC BLD AUTO-RTO: 0 /100 WBCS
NRBC BLD AUTO-RTO: 0 /100 WBCS
PLATELET # BLD AUTO: 204 THOUSANDS/UL (ref 149–390)
PLATELET # BLD AUTO: 214 THOUSANDS/UL (ref 149–390)
PMV BLD AUTO: 10.8 FL (ref 8.9–12.7)
PMV BLD AUTO: 11 FL (ref 8.9–12.7)
POTASSIUM SERPL-SCNC: 3.8 MMOL/L (ref 3.5–5.3)
POTASSIUM SERPL-SCNC: 4.6 MMOL/L (ref 3.5–5.3)
RBC # BLD AUTO: 4.35 MILLION/UL (ref 3.88–5.62)
RBC # BLD AUTO: 4.52 MILLION/UL (ref 3.88–5.62)
SODIUM SERPL-SCNC: 138 MMOL/L (ref 135–147)
SODIUM SERPL-SCNC: 139 MMOL/L (ref 135–147)
WBC # BLD AUTO: 8.63 THOUSAND/UL (ref 4.31–10.16)
WBC # BLD AUTO: 8.95 THOUSAND/UL (ref 4.31–10.16)

## 2025-02-18 PROCEDURE — 80048 BASIC METABOLIC PNL TOTAL CA: CPT

## 2025-02-18 PROCEDURE — 36415 COLL VENOUS BLD VENIPUNCTURE: CPT

## 2025-02-18 PROCEDURE — 99232 SBSQ HOSP IP/OBS MODERATE 35: CPT | Performed by: INTERNAL MEDICINE

## 2025-02-18 PROCEDURE — 99232 SBSQ HOSP IP/OBS MODERATE 35: CPT | Performed by: PODIATRIST

## 2025-02-18 PROCEDURE — 82948 REAGENT STRIP/BLOOD GLUCOSE: CPT

## 2025-02-18 PROCEDURE — 85025 COMPLETE CBC W/AUTO DIFF WBC: CPT

## 2025-02-18 PROCEDURE — 73630 X-RAY EXAM OF FOOT: CPT

## 2025-02-18 RX ORDER — INSULIN LISPRO 100 [IU]/ML
11 INJECTION, SOLUTION INTRAVENOUS; SUBCUTANEOUS
Status: DISCONTINUED | OUTPATIENT
Start: 2025-02-18 | End: 2025-02-19 | Stop reason: HOSPADM

## 2025-02-18 RX ORDER — TAMSULOSIN HYDROCHLORIDE 0.4 MG/1
0.4 CAPSULE ORAL
Status: DISCONTINUED | OUTPATIENT
Start: 2025-02-18 | End: 2025-02-19 | Stop reason: HOSPADM

## 2025-02-18 RX ORDER — ACETAMINOPHEN 325 MG/1
650 TABLET ORAL 2 TIMES DAILY
Status: DISCONTINUED | OUTPATIENT
Start: 2025-02-18 | End: 2025-02-19 | Stop reason: HOSPADM

## 2025-02-18 RX ORDER — ESCITALOPRAM OXALATE 10 MG/1
10 TABLET ORAL DAILY
Status: DISCONTINUED | OUTPATIENT
Start: 2025-02-18 | End: 2025-02-19 | Stop reason: HOSPADM

## 2025-02-18 RX ORDER — DILTIAZEM HCL 60 MG
120 TABLET ORAL EVERY 12 HOURS SCHEDULED
Status: DISCONTINUED | OUTPATIENT
Start: 2025-02-18 | End: 2025-02-19 | Stop reason: HOSPADM

## 2025-02-18 RX ORDER — INSULIN GLARGINE 100 [IU]/ML
15 INJECTION, SOLUTION SUBCUTANEOUS
Status: DISCONTINUED | OUTPATIENT
Start: 2025-02-18 | End: 2025-02-19 | Stop reason: HOSPADM

## 2025-02-18 RX ORDER — INSULIN LISPRO 100 [IU]/ML
1-6 INJECTION, SOLUTION INTRAVENOUS; SUBCUTANEOUS
Status: DISCONTINUED | OUTPATIENT
Start: 2025-02-19 | End: 2025-02-19 | Stop reason: HOSPADM

## 2025-02-18 RX ORDER — INSULIN GLARGINE 100 [IU]/ML
34 INJECTION, SOLUTION SUBCUTANEOUS
Status: DISCONTINUED | OUTPATIENT
Start: 2025-02-18 | End: 2025-02-18

## 2025-02-18 RX ORDER — ONDANSETRON 2 MG/ML
4 INJECTION INTRAMUSCULAR; INTRAVENOUS EVERY 6 HOURS PRN
Status: DISCONTINUED | OUTPATIENT
Start: 2025-02-18 | End: 2025-02-19 | Stop reason: HOSPADM

## 2025-02-18 RX ORDER — LISINOPRIL 20 MG/1
40 TABLET ORAL DAILY
Status: DISCONTINUED | OUTPATIENT
Start: 2025-02-18 | End: 2025-02-19 | Stop reason: HOSPADM

## 2025-02-18 RX ORDER — INSULIN LISPRO 100 [IU]/ML
1-6 INJECTION, SOLUTION INTRAVENOUS; SUBCUTANEOUS
Status: DISCONTINUED | OUTPATIENT
Start: 2025-02-18 | End: 2025-02-18

## 2025-02-18 RX ORDER — INSULIN GLARGINE 100 [IU]/ML
28 INJECTION, SOLUTION SUBCUTANEOUS
Status: DISCONTINUED | OUTPATIENT
Start: 2025-02-18 | End: 2025-02-18

## 2025-02-18 RX ORDER — ATORVASTATIN CALCIUM 40 MG/1
40 TABLET, FILM COATED ORAL
Status: DISCONTINUED | OUTPATIENT
Start: 2025-02-18 | End: 2025-02-19 | Stop reason: HOSPADM

## 2025-02-18 RX ORDER — CLOPIDOGREL BISULFATE 75 MG/1
75 TABLET ORAL DAILY
Status: DISCONTINUED | OUTPATIENT
Start: 2025-02-18 | End: 2025-02-19 | Stop reason: HOSPADM

## 2025-02-18 RX ORDER — SENNOSIDES 8.6 MG
1 TABLET ORAL DAILY
Status: DISCONTINUED | OUTPATIENT
Start: 2025-02-18 | End: 2025-02-19 | Stop reason: HOSPADM

## 2025-02-18 RX ORDER — INSULIN GLARGINE 100 [IU]/ML
25 INJECTION, SOLUTION SUBCUTANEOUS
Status: DISCONTINUED | OUTPATIENT
Start: 2025-02-18 | End: 2025-02-18

## 2025-02-18 RX ORDER — HYDROMORPHONE HCL IN WATER/PF 6 MG/30 ML
0.2 PATIENT CONTROLLED ANALGESIA SYRINGE INTRAVENOUS EVERY 2 HOUR PRN
Status: DISCONTINUED | OUTPATIENT
Start: 2025-02-18 | End: 2025-02-19 | Stop reason: HOSPADM

## 2025-02-18 RX ORDER — OXYCODONE HYDROCHLORIDE 5 MG/1
5 TABLET ORAL EVERY 4 HOURS PRN
Status: DISCONTINUED | OUTPATIENT
Start: 2025-02-18 | End: 2025-02-19 | Stop reason: HOSPADM

## 2025-02-18 RX ORDER — CEFAZOLIN SODIUM 2 G/50ML
2000 SOLUTION INTRAVENOUS EVERY 8 HOURS
Status: DISCONTINUED | OUTPATIENT
Start: 2025-02-18 | End: 2025-02-19 | Stop reason: HOSPADM

## 2025-02-18 RX ADMIN — CEFAZOLIN SODIUM 2000 MG: 2 SOLUTION INTRAVENOUS at 05:01

## 2025-02-18 RX ADMIN — CEFAZOLIN SODIUM 2000 MG: 2 SOLUTION INTRAVENOUS at 12:49

## 2025-02-18 RX ADMIN — APIXABAN 5 MG: 5 TABLET, FILM COATED ORAL at 08:54

## 2025-02-18 RX ADMIN — INSULIN LISPRO 6 UNITS: 100 INJECTION, SOLUTION INTRAVENOUS; SUBCUTANEOUS at 18:04

## 2025-02-18 RX ADMIN — TAMSULOSIN HYDROCHLORIDE 0.4 MG: 0.4 CAPSULE ORAL at 16:38

## 2025-02-18 RX ADMIN — INSULIN LISPRO 11 UNITS: 100 INJECTION, SOLUTION INTRAVENOUS; SUBCUTANEOUS at 12:10

## 2025-02-18 RX ADMIN — SENNOSIDES 8.6 MG: 8.6 TABLET, FILM COATED ORAL at 08:33

## 2025-02-18 RX ADMIN — ESCITALOPRAM OXALATE 10 MG: 10 TABLET ORAL at 08:33

## 2025-02-18 RX ADMIN — INSULIN GLARGINE 15 UNITS: 100 INJECTION, SOLUTION SUBCUTANEOUS at 21:13

## 2025-02-18 RX ADMIN — APIXABAN 5 MG: 5 TABLET, FILM COATED ORAL at 16:38

## 2025-02-18 RX ADMIN — INSULIN LISPRO 11 UNITS: 100 INJECTION, SOLUTION INTRAVENOUS; SUBCUTANEOUS at 08:54

## 2025-02-18 RX ADMIN — LISINOPRIL 40 MG: 20 TABLET ORAL at 09:35

## 2025-02-18 RX ADMIN — ACETAMINOPHEN 650 MG: 325 TABLET, FILM COATED ORAL at 08:32

## 2025-02-18 RX ADMIN — CLOPIDOGREL BISULFATE 75 MG: 75 TABLET ORAL at 08:54

## 2025-02-18 RX ADMIN — DILTIAZEM HYDROCHLORIDE 120 MG: 60 TABLET ORAL at 21:13

## 2025-02-18 RX ADMIN — CEFAZOLIN SODIUM 2000 MG: 2 SOLUTION INTRAVENOUS at 21:13

## 2025-02-18 RX ADMIN — INSULIN LISPRO 1 UNITS: 100 INJECTION, SOLUTION INTRAVENOUS; SUBCUTANEOUS at 12:11

## 2025-02-18 RX ADMIN — DILTIAZEM HYDROCHLORIDE 120 MG: 60 TABLET ORAL at 09:34

## 2025-02-18 RX ADMIN — ATORVASTATIN CALCIUM 40 MG: 40 TABLET, FILM COATED ORAL at 16:38

## 2025-02-18 NOTE — UTILIZATION REVIEW
Initial Clinical Review    Admission: Date/Time/Statement:   Admission Orders (From admission, onward)       Ordered        02/17/25 1728  Inpatient Admission  Once                          Orders Placed This Encounter   Procedures    Inpatient Admission     Standing Status:   Standing     Number of Occurrences:   1     Level of Care:   Med Surg [16]     Estimated length of stay:   More than 2 Midnights     Certification:   I certify that inpatient services are medically necessary for this patient for a duration of greater than two midnights. See H&P and MD Progress Notes for additional information about the patient's course of treatment.     ED Arrival Information       Expected   2/17/2025 10:34    Arrival   2/17/2025 14:31    Acuity   Urgent              Means of arrival   Walk-In    Escorted by   Self    Service   Podiatry    Admission type   Emergency              Arrival complaint   cellulitis of rle             Chief Complaint   Patient presents with    Cellulitis     RLE starting Friday; podiatry referred pt to ED for eval       Initial Presentation: 74 y.o. male to presents to [facility] unit as a direct Inpatient Admission to [facility] unit with recurrent R foot diabetic ulcer and cellulitis.  PMHx: T2 Dm, HTN, A-fib, h/o right transmetatarsal amputation with free flap, history of left partial fourth ray resection.  Pt was seen in podiatry office today and was referred to hospital for further treatment.  Wound and blood cxs taken. Start IV ancef. Local wound care. R foot x-rays ordered. Consult Intern Med for medical management. WBAT R foot, R heel in surgical shoe. Continue PTA po meds. Accu-cheks w/ ssi. Analgesics prn.     Anticipated Length of Stay/Certification Statement:  Patient requires >2 midnight stay for IV antibiotic.                   Date: 2/18   Day 2: right foot cellulitis continues to improve.  WBCs stable. Wound cxs are growing staph RES.  Continue IV Ancef at this time. F/u cxs.  Left third toe wound  is stable. Does not probe to bone.  X-rays negative for osteomyelitis. No plans for OR plans at this time. Continue local wound care consisting of Betadine DSD. WBAT. Holding home metformin. Lantus 28 units. Lispro 11 units with meals to start with, will likely increase to 15 units. Accu-cheks w/ ssi.            Date: 2/19  Day 3: Has surpassed a 2nd midnight with active treatments and services for continued monitoring and tx of L foot wound.  Pt denies foot pain or any complaints today. Right foot wound cellulitis has cleared.  Left third toe wound is stable and dry. No signs of infection of left foot lateral column. Change IV abx to po keflex and ok to d/c home with op f/u. Continue WBAT, PTA po meds. Insulin with strict blood sugar control.       ED Treatment-Medication Administration from 02/17/2025 1033 to 02/18/2025 1432         Date/Time Order Dose Route Action     02/18/2025 0832 acetaminophen (TYLENOL) tablet 650 mg 650 mg Oral Given     02/18/2025 0854 clopidogrel (PLAVIX) tablet 75 mg 75 mg Oral Given     02/18/2025 0934 diltiazem (CARDIZEM) tablet 120 mg 120 mg Oral Given     02/18/2025 0854 apixaban (ELIQUIS) tablet 5 mg 5 mg Oral Given     02/18/2025 0833 escitalopram (LEXAPRO) tablet 10 mg 10 mg Oral Given     02/18/2025 0935 lisinopril (ZESTRIL) tablet 40 mg 40 mg Oral Given     02/18/2025 0501 ceFAZolin (ANCEF) IVPB (premix in dextrose) 2,000 mg 50 mL 2,000 mg Intravenous New Bag     02/18/2025 1249 ceFAZolin (ANCEF) IVPB (premix in dextrose) 2,000 mg 50 mL 2,000 mg Intravenous New Bag     02/18/2025 0833 senna (SENOKOT) tablet 8.6 mg 8.6 mg Oral Given     02/18/2025 0854 insulin lispro (HumALOG/ADMELOG) 100 units/mL subcutaneous injection 11 Units 11 Units Subcutaneous Given     02/18/2025 1210 insulin lispro (HumALOG/ADMELOG) 100 units/mL subcutaneous injection 11 Units 11 Units Subcutaneous Given     02/18/2025 1211 insulin lispro (HumALOG/ADMELOG) 100 units/mL subcutaneous injection 1-6 Units 1  Units Subcutaneous Given       Scheduled Medications:  acetaminophen, 650 mg, Oral, BID  apixaban, 5 mg, Oral, BID  atorvastatin, 40 mg, Oral, Daily With Dinner  cefazolin, 2,000 mg, Intravenous, Q8H  [Held by provider] clopidogrel, 75 mg, Oral, Daily  diltiazem, 120 mg, Oral, Q12H SUNG  escitalopram, 10 mg, Oral, Daily  insulin glargine, 15 Units, Subcutaneous, HS  insulin lispro, 1-6 Units, Subcutaneous, TID AC  insulin lispro, 11 Units, Subcutaneous, Daily With Breakfast  insulin lispro, 11 Units, Subcutaneous, Daily With Lunch  insulin lispro, 11 Units, Subcutaneous, Daily With Dinner  lisinopril, 40 mg, Oral, Daily  senna, 1 tablet, Oral, Daily  tamsulosin, 0.4 mg, Oral, Daily With Dinner    PRN Meds:  HYDROmorphone, 0.2 mg, Intravenous, Q2H PRN  naloxone, 0.04 mg, Intravenous, Q1MIN PRN  ondansetron, 4 mg, Intravenous, Q6H PRN  oxyCODONE, 2.5 mg, Oral, Q4H PRN   Or  oxyCODONE, 5 mg, Oral, Q4H PRN      ED Triage Vitals   Temperature Pulse Respirations Blood Pressure SpO2 Pain Score   02/17/25 1439 02/17/25 1439 02/17/25 1439 02/17/25 1439 02/17/25 1439 02/18/25 1608   97.8 °F (36.6 °C) 94 18 108/85 99 % No Pain     Weight (last 2 days)       Date/Time Weight    02/18/25 1601 109 (240.52)            Vital Signs (last 3 days)       Date/Time Temp Pulse Resp BP MAP (mmHg) SpO2 O2 Device Patient Position - Orthostatic VS Pain    02/19/25 0944 -- -- -- -- -- -- -- -- No Pain    02/19/25 09:25:02 -- 101 -- 147/107 120 97 % -- -- --    02/19/25 07:33:22 98.2 °F (36.8 °C) 89 -- 165/98 120 97 % -- -- --    02/19/25 0100 -- 65 -- -- -- -- -- -- --    02/18/25 2219 -- -- -- -- -- 96 % None (Room air) -- No Pain    02/18/25 2113 98.3 °F (36.8 °C) 6 18 160/82 -- 96 % None (Room air) Sitting --    02/18/25 1608 97.5 °F (36.4 °C) 62 18 110/72 -- 98 % None (Room air) Sitting No Pain    02/18/25 1500 -- 57 -- 131/67 90 97 % -- -- --    02/18/25 0900 -- 93 -- 170/88 121 98 % -- -- --    02/18/25 0700 -- 86 -- 158/93 120 97 % None  (Room air) Lying --    02/18/25 0600 -- 78 18 121/56 81 95 % None (Room air) -- --    02/18/25 0300 -- 81 17 143/77 101 96 % -- Lying --    02/18/25 0100 -- 81 16 168/71 102 97 % None (Room air) Lying --    02/18/25 0000 -- 74 16 151/86 115 97 % None (Room air) Lying --    02/17/25 2200 -- 71 -- 128/66 91 97 % -- -- --    02/17/25 1935 -- 80 16 138/71 -- 98 % None (Room air) -- --    02/17/25 1717 -- -- -- -- -- -- None (Room air) -- --    02/17/25 1439 97.8 °F (36.6 °C) 94 18 108/85 93 99 % None (Room air) Sitting --            Pertinent Labs/Diagnostic Test Results:   Radiology:  XR foot 3+ vw right   Final Interpretation by Scott Dunaway MD (02/19 0811)      No convincing evidence of osteomyelitis at this time. No soft tissue gas identified         Computerized Assisted Algorithm (CAA) may have been used to analyze all applicable images.         Workstation performed: [id number]         XR foot 3+ vw left   Final Interpretation by Scott Dunaway MD (02/19 0818)      Linear calcific densities immediately adjacent to the fifth metatarsal shaft are more prominent than on the most recent prior study and new compared with older studies. While the appearance is somewhat atypical for periosteal reaction, the possibility of    osteomyelitis changes cannot be entirely excluded in this region. Consider follow-up MRI.      The study was marked in EPIC for immediate notification.         Computerized Assisted Algorithm (CAA) may have been used to analyze all applicable images.         Workstation performed: [id number]               Results from last 7 days   Lab Units 02/19/25  0947 02/18/25  1734 02/18/25  0428   WBC Thousand/uL 7.51 8.95 8.63   HEMOGLOBIN g/dL 14.3 14.2 13.6   HEMATOCRIT % 44.7 44.4 42.6   PLATELETS Thousands/uL 195 214 204   TOTAL NEUT ABS Thousands/µL 5.24 5.54 5.21     Results from last 7 days   Lab Units 02/19/25  0947 02/18/25  1734 02/18/25  0428 02/17/25  1818   SODIUM mmol/L 137  "138 139 140   POTASSIUM mmol/L 4.1 3.8 4.6 4.2   CHLORIDE mmol/L 103 101 105 105   CO2 mmol/L 27 28 26 26   ANION GAP mmol/L 7 9 8 9   BUN mg/dL 15 16 17 21   CREATININE mg/dL 1.08 1.14 0.97 1.09   EGFR ml/min/1.73sq m 68 63 77 67   CALCIUM mg/dL 8.9 9.5 8.8 9.2     Results from last 7 days   Lab Units 02/19/25  0732 02/18/25  2048 02/18/25  1747 02/18/25  1636 02/18/25  1129 02/18/25  0852   POC GLUCOSE mg/dl 182* 160* 89 80 151* 284*     Results from last 7 days   Lab Units 02/19/25  0947 02/18/25  1734 02/18/25  0428 02/17/25  1818   GLUCOSE RANDOM mg/dL 316* 81 182* 187*       Results from last 7 days   Lab Units 02/17/25  1118   GRAM STAIN RESULT  No polys seen*  2+ Gram positive cocci in clusters*   WOUND CULTURE  2+ Growth of Staphylococcus aureus*       Past Medical History:   Diagnosis Date    Arthritis     Atrial fibrillation (HCC)     Diagnosed 11/2018    Benign prostate hyperplasia 04/2002    Cellulitis     right lower leg     Colon polyp 2006    Diabetes mellitus (HCC)     Hearing aid worn     bilat    Hearing loss     90% loss left ear and 40% right ear    History of clubfoot     \"since birth\"    History of pneumonia     History of TIA (transient ischemic attack) 04/25/2017 11/30/18 pt denies    Hyperlipidemia 5/15/2014    Hypertension     Infectious viral hepatitis     \"cant remember type\"    Irregular heart beat     Afib    Neuropathy     both feet    Osteomyelitis (HCC)     right great toe    Pneumonia     Right middle lobe pulmonary nodule 11/6/2018    Seasickness     Teeth missing     Type 2 diabetes mellitus with diabetic neuropathy (HCC) 11/6/2018    Wears glasses     reading    Wound, open     bottom of right foot     Present on Admission:   (Resolved) Cellulitis of right lower extremity   Ambulatory dysfunction   Atrial fibrillation (HCC)   Ulcer of right foot with fat layer exposed (HCC)   Type 2 diabetes mellitus with diabetic neuropathy (HCC)   Hyperlipidemia   Essential " hypertension      Admitting Diagnosis: Essential hypertension [I10]  Ulcer of toe of left foot, limited to breakdown of skin (HCC) [L97.521]  Ulcer of right foot with fat layer exposed (HCC) [L97.512]  Longstanding persistent atrial fibrillation (HCC) [I48.11]  Type 2 diabetes mellitus with diabetic neuropathy, with long-term current use of insulin (HCC) [E11.40, Z79.4]  Type 2 diabetes mellitus with other skin ulcer (CODE) (HCC) [E11.622]  Age/Sex: 74 y.o. male    Network Utilization Review Department  ATTENTION: Please call with any questions or concerns to [phone] and carefully listen to the prompts so that you are directed to the right person. All voicemails are confidential.   For Discharge needs, contact Care Management DC Support Team at [phone] opt. [phone]  Send all requests for admission clinical reviews, approved or denied determinations and any other requests to dedicated fax number below belonging to the campus where the patient is receiving treatment. List of dedicated fax numbers for the Facilities:  FACILITY NAME UR FAX NUMBER   ADMISSION DENIALS (Administrative/Medical Necessity) [phone]   DISCHARGE SUPPORT TEAM (NETWORK) [phone]   PARENT CHILD HEALTH (Maternity/NICU/Pediatrics) [phone]   [facility] [phone]   [facility] [phone]   [facility] [phone]   [facility] [phone]   [facility] [phone]   [facility] [phone]   [facility] [phone]   [facility] [phone]   [facility] [phone]   [facility] [phone]   [facility] [phone]   [facility] [phone]

## 2025-02-18 NOTE — PROGRESS NOTES
"Progress Note - Internal Medicine   Name: Won Gaitan Jr. 74 y.o. male I MRN: [id number]  Unit/Bed#: QCA I Date of Admission: 2/17/2025   Date of Service: 2/18/2025 I Hospital Day: 1    Assessment & Plan  Cellulitis of right lower extremity  R foot and lower leg swollen and discolored, consistent with cellulitis.  Management per Podiatry team, currently on ancef  Hyperlipidemia  On atorvastatin 40mg at home.  Continue statin  Type 2 diabetes mellitus with diabetic neuropathy (HCC)  Lab Results   Component Value Date    HGBA1C 7.7 (H) 01/16/2025       No results for input(s): \"POCGLU\" in the last 72 hours.    Blood Sugar Average: Last 72 hrs:    HgbA1c 7.7 on 1/16/25. Uses transdermal continuous monitor. On insulin Lispro 20U TID, insulin glargine 30U HS, and metformin 750mg BID at home.  Hold home metformin  Lantus 28 units  Lispro 11 units with meals to start with, will likely increase to 15  Sliding scale insulin  Monitor glucose  Atrial fibrillation (HCC)  On Eliquis 5mg at home, scheduled to switch today 2/17 to Plavix due to financial considerations. Also on diltiazem 120mg q12h and Toprol BID at home.  Continue Eliquis 5mg  Continue home diltiazem and Toprol  Monitor for cardiac sx or changes  Would benefit from continuing DOAC rather than switching to Plavix.  Essential hypertension  On diltiazem 120mg q12h, lisinopril 40mg daily, and Toprol BID at home. Monitors BP at home, but is unsure of typical numbers.  Continue home meds  Monitor vitals  Ulcer of right foot with fat layer exposed (HCC)  Ulcer on R foot that had been healing but now question of infection.  Management per Podiatry team  Ambulatory dysfunction  Uses wheelchair at baseline. Hx of diabetic neuropathy.  Fall precautions  Management per Podiatry team      Subjective   Patient seen and examined. No acute events overnight.  Reports he has not received any insulin yet.  Eating and drinking appropriately.  No acute complaints.    Objective " :  Temp:  [97.6 °F (36.4 °C)-97.8 °F (36.6 °C)] 97.8 °F (36.6 °C)  HR:  [71-94] 78  BP: (108-168)/(56-86) 121/56  Resp:  [16-18] 18  SpO2:  [95 %-99 %] 95 %  O2 Device: None (Room air)    I/O       None          Weights:        There is no height or weight on file to calculate BMI.  Weight (last 2 days)       None            Physical Exam  Vitals reviewed.   Constitutional:       General: He is not in acute distress.  HENT:      Head: Normocephalic and atraumatic.   Eyes:      Extraocular Movements: Extraocular movements intact.   Cardiovascular:      Rate and Rhythm: Normal rate. Rhythm irregular.   Pulmonary:      Effort: Pulmonary effort is normal.      Breath sounds: Normal breath sounds.   Abdominal:      General: There is distension.      Palpations: Abdomen is soft.      Tenderness: There is no abdominal tenderness.   Musculoskeletal:         General: Deformity present.      Right lower leg: Edema present.      Left lower leg: No edema.      Comments: Right lower extremity midfoot amputation.  Right calf with some erythema and swelling.    Skin:     General: Skin is warm and dry.      Capillary Refill: Capillary refill takes less than 2 seconds.   Neurological:      General: No focal deficit present.      Mental Status: He is alert and oriented to person, place, and time.   Psychiatric:         Mood and Affect: Mood normal.         Behavior: Behavior normal.           Lab Results: I have reviewed the following results:  Recent Labs     02/18/25  0428   WBC 8.63   HGB 13.6   HCT 42.6      SODIUM 139   K 4.6      CO2 26   BUN 17   CREATININE 0.97   GLUC 182*             Currently Ordered Meds:   Current Facility-Administered Medications:     acetaminophen (TYLENOL) tablet 650 mg, BID    apixaban (ELIQUIS) tablet 5 mg, BID    atorvastatin (LIPITOR) tablet 40 mg, Daily With Dinner    ceFAZolin (ANCEF) IVPB (premix in dextrose) 2,000 mg 50 mL, Q8H, Last Rate: 2,000 mg (02/18/25 0501)    clopidogrel  (PLAVIX) tablet 75 mg, Daily    diltiazem (CARDIZEM) tablet 120 mg, Q12H SUNG    escitalopram (LEXAPRO) tablet 10 mg, Daily    HYDROmorphone HCl (DILAUDID) injection 0.2 mg, Q2H PRN    insulin glargine (LANTUS) subcutaneous injection 34 Units 0.34 mL, HS    insulin lispro (HumALOG/ADMELOG) 100 units/mL subcutaneous injection 11 Units, Daily With Breakfast    insulin lispro (HumALOG/ADMELOG) 100 units/mL subcutaneous injection 11 Units, Daily With Lunch    insulin lispro (HumALOG/ADMELOG) 100 units/mL subcutaneous injection 11 Units, Daily With Dinner    lisinopril (ZESTRIL) tablet 40 mg, Daily    naloxone (NARCAN) 0.04 mg/mL syringe 0.04 mg, Q1MIN PRN    ondansetron (ZOFRAN) injection 4 mg, Q6H PRN    oxyCODONE (ROXICODONE) split tablet 2.5 mg, Q4H PRN **OR** oxyCODONE (ROXICODONE) IR tablet 5 mg, Q4H PRN    senna (SENOKOT) tablet 8.6 mg, Daily    tamsulosin (FLOMAX) capsule 0.4 mg, Daily With Dinner  VTE Pharmacologic Prophylaxis: VTE covered by:  apixaban, Oral     VTE Mechanical Prophylaxis: sequential compression device    Administrative Statements     Portions of the record may have been created with voice recognition software.

## 2025-02-18 NOTE — PROGRESS NOTES
Podiatry - Progress Note  Patient: Won Gaitan Jr. 74 y.o. male   MRN: [id number]  PCP: Pieter Torres DO  Unit/Bed#: QCA Encounter: [id number]  Date Of Visit: 25    ASSESSMENT:    Won Gaitan Jr. is a 74 y.o. male with:    Right foot diabetic foot ulceration, Parks grade 1  Right foot cellulitis  Left third toe diabetic foot ulceration, Parks grade 1  Peripheral arterial disease  History of right foot TMA with free flap  History of left fourth partial ray amputation  Atrial fibrillation on Eliquis      PLAN:    Patient evaluated bedside, dressing changed at bedside.  Patient is right foot cellulitis continues to improve.  White blood cell count is stable.  Wound cultures are growing staph RES.  Will plan to continue IV Ancef at this time.  Will likely discharge within the next 24 to 48 hours pending final culture sensitivities.  No OR plans at this time.  Left third toe wound is stable.  Does not probe to bone.  X-rays negative for osteomyelitis.  Will plan for continue local wound care consisting of Betadine DSD.  Elevation and offloading on green foam wedges or pillows when non-ambulatory.  Rest of care per primary team.     Weightbearing status: Weightbearing as tolerated    SUBJECTIVE:     The patient was seen, evaluated, and assessed at bedside today. The patient was awake, alert, and in no acute distress. No acute events overnight. The patient reports he is having reduced pain to the right foot.. Patient denies N/V/F/chills/SOB/CP.      OBJECTIVE:     Vitals:   /88   Pulse 93   Temp 97.8 °F (36.6 °C) (Temporal)   Resp 18   SpO2 98%     Temp (24hrs), Av.8 °F (36.6 °C), Min:97.8 °F (36.6 °C), Max:97.8 °F (36.6 °C)      Physical Exam:     Lungs: Non labored breathing  Abdomen: Soft, non-tender.  Lower Extremity:  Cardiovascular status at baseline from admission.  Neurological status at baseline from admission.  Musculoskeletal status at baseline from admission. No calf tenderness noted.  "    Wound #: 1  Location: Right plantar medial foot  Length 4 cm: Width 2 cm: Depth 0.1cm:   Deepest Tissue Noted in Base: Subcutaneous  Probe to Bone: No  Peripheral Skin Description: Attached  Granulation: 100% Fibrotic Tissue: 0% Necrotic Tissue: 0%   Drainage Amount: minimal, serous  Signs of Infection: Yes, negative probe to bone, negative crepitus, positive erythema, edema, negative purulence drainage, negative proximal tracking erythema         Wound #: 2  Location: Left dorsal third toe  Length 0.6cm: Width 0.5cm: Depth scabbed:   Deepest Tissue Noted in Base: Scabbed  Probe to Bone: No  Peripheral Skin Description: Attached  Granulation: 0% Fibrotic Tissue: 100% scab, necrotic Tissue: 0%   Drainage Amount: None  Signs of Infection: No            Additional Data:     Labs:    Results from last 7 days   Lab Units 02/18/25  0428   WBC Thousand/uL 8.63   HEMOGLOBIN g/dL 13.6   HEMATOCRIT % 42.6   PLATELETS Thousands/uL 204   SEGS PCT % 60   LYMPHO PCT % 28   MONO PCT % 8   EOS PCT % 3     Results from last 7 days   Lab Units 02/18/25  0428   POTASSIUM mmol/L 4.6   CHLORIDE mmol/L 105   CO2 mmol/L 26   BUN mg/dL 17   CREATININE mg/dL 0.97   CALCIUM mg/dL 8.8           * I Have Reviewed All Lab Data Listed Above.    Recent Cultures (last 7 days):     Results from last 7 days   Lab Units 02/17/25  1118   WOUND CULTURE  2+ Growth of Staphylococcus aureus*           Imaging: I have personally reviewed pertinent films in PACS  EKG, Pathology, and Other Studies: I have personally reviewed pertinent reports.    ** Please Note: Portions of the record may have been created with voice recognition software. Occasional wrong word or \"sound a like\" substitutions may have occurred due to the inherent limitations of voice recognition software. Read the chart carefully and recognize, using context, where substitutions have occurred. **      "

## 2025-02-19 ENCOUNTER — TELEPHONE (OUTPATIENT)
Dept: FAMILY MEDICINE CLINIC | Facility: CLINIC | Age: 75
End: 2025-02-19

## 2025-02-19 ENCOUNTER — TRANSITIONAL CARE MANAGEMENT (OUTPATIENT)
Dept: FAMILY MEDICINE CLINIC | Facility: CLINIC | Age: 75
End: 2025-02-19

## 2025-02-19 VITALS
TEMPERATURE: 98.2 F | BODY MASS INDEX: 29.29 KG/M2 | HEIGHT: 76 IN | WEIGHT: 240.52 LBS | RESPIRATION RATE: 18 BRPM | OXYGEN SATURATION: 97 % | DIASTOLIC BLOOD PRESSURE: 107 MMHG | SYSTOLIC BLOOD PRESSURE: 147 MMHG | HEART RATE: 101 BPM

## 2025-02-19 PROBLEM — L03.115 CELLULITIS OF RIGHT LOWER EXTREMITY: Status: RESOLVED | Noted: 2018-09-05 | Resolved: 2025-02-19

## 2025-02-19 LAB
ANION GAP SERPL CALCULATED.3IONS-SCNC: 7 MMOL/L (ref 4–13)
BACTERIA WND AEROBE CULT: ABNORMAL
BASOPHILS # BLD AUTO: 0.03 THOUSANDS/ΜL (ref 0–0.1)
BASOPHILS NFR BLD AUTO: 0 % (ref 0–1)
BUN SERPL-MCNC: 15 MG/DL (ref 5–25)
CALCIUM SERPL-MCNC: 8.9 MG/DL (ref 8.4–10.2)
CHLORIDE SERPL-SCNC: 103 MMOL/L (ref 96–108)
CO2 SERPL-SCNC: 27 MMOL/L (ref 21–32)
CREAT SERPL-MCNC: 1.08 MG/DL (ref 0.6–1.3)
EOSINOPHIL # BLD AUTO: 0.15 THOUSAND/ΜL (ref 0–0.61)
EOSINOPHIL NFR BLD AUTO: 2 % (ref 0–6)
ERYTHROCYTE [DISTWIDTH] IN BLOOD BY AUTOMATED COUNT: 13.2 % (ref 11.6–15.1)
GFR SERPL CREATININE-BSD FRML MDRD: 68 ML/MIN/1.73SQ M
GLUCOSE SERPL-MCNC: 182 MG/DL (ref 65–140)
GLUCOSE SERPL-MCNC: 316 MG/DL (ref 65–140)
GRAM STN SPEC: ABNORMAL
GRAM STN SPEC: ABNORMAL
HCT VFR BLD AUTO: 44.7 % (ref 36.5–49.3)
HGB BLD-MCNC: 14.3 G/DL (ref 12–17)
IMM GRANULOCYTES # BLD AUTO: 0.03 THOUSAND/UL (ref 0–0.2)
IMM GRANULOCYTES NFR BLD AUTO: 0 % (ref 0–2)
LYMPHOCYTES # BLD AUTO: 1.51 THOUSANDS/ΜL (ref 0.6–4.47)
LYMPHOCYTES NFR BLD AUTO: 20 % (ref 14–44)
MCH RBC QN AUTO: 31.2 PG (ref 26.8–34.3)
MCHC RBC AUTO-ENTMCNC: 32 G/DL (ref 31.4–37.4)
MCV RBC AUTO: 97 FL (ref 82–98)
MONOCYTES # BLD AUTO: 0.55 THOUSAND/ΜL (ref 0.17–1.22)
MONOCYTES NFR BLD AUTO: 7 % (ref 4–12)
NEUTROPHILS # BLD AUTO: 5.24 THOUSANDS/ΜL (ref 1.85–7.62)
NEUTS SEG NFR BLD AUTO: 71 % (ref 43–75)
NRBC BLD AUTO-RTO: 0 /100 WBCS
PLATELET # BLD AUTO: 195 THOUSANDS/UL (ref 149–390)
PMV BLD AUTO: 10.6 FL (ref 8.9–12.7)
POTASSIUM SERPL-SCNC: 4.1 MMOL/L (ref 3.5–5.3)
RBC # BLD AUTO: 4.59 MILLION/UL (ref 3.88–5.62)
SODIUM SERPL-SCNC: 137 MMOL/L (ref 135–147)
WBC # BLD AUTO: 7.51 THOUSAND/UL (ref 4.31–10.16)

## 2025-02-19 PROCEDURE — 80048 BASIC METABOLIC PNL TOTAL CA: CPT

## 2025-02-19 PROCEDURE — 99232 SBSQ HOSP IP/OBS MODERATE 35: CPT | Performed by: INTERNAL MEDICINE

## 2025-02-19 PROCEDURE — 85025 COMPLETE CBC W/AUTO DIFF WBC: CPT

## 2025-02-19 PROCEDURE — NC001 PR NO CHARGE: Performed by: PODIATRIST

## 2025-02-19 PROCEDURE — 82948 REAGENT STRIP/BLOOD GLUCOSE: CPT

## 2025-02-19 RX ORDER — CEPHALEXIN 500 MG/1
500 CAPSULE ORAL EVERY 6 HOURS SCHEDULED
Qty: 20 CAPSULE | Refills: 0 | Status: SHIPPED | OUTPATIENT
Start: 2025-02-19 | End: 2025-02-24

## 2025-02-19 RX ADMIN — LISINOPRIL 40 MG: 20 TABLET ORAL at 09:26

## 2025-02-19 RX ADMIN — APIXABAN 5 MG: 5 TABLET, FILM COATED ORAL at 09:26

## 2025-02-19 RX ADMIN — CEFAZOLIN SODIUM 2000 MG: 2 SOLUTION INTRAVENOUS at 05:19

## 2025-02-19 RX ADMIN — DILTIAZEM HYDROCHLORIDE 120 MG: 60 TABLET ORAL at 09:26

## 2025-02-19 RX ADMIN — ESCITALOPRAM OXALATE 10 MG: 10 TABLET ORAL at 09:26

## 2025-02-19 RX ADMIN — INSULIN LISPRO 1 UNITS: 100 INJECTION, SOLUTION INTRAVENOUS; SUBCUTANEOUS at 09:27

## 2025-02-19 RX ADMIN — INSULIN LISPRO 11 UNITS: 100 INJECTION, SOLUTION INTRAVENOUS; SUBCUTANEOUS at 09:26

## 2025-02-19 NOTE — DISCHARGE SUMMARY
PODIATRY DISCHARGE SUMMARY     Patient Name: Won Gaitan Jr.   Age & Sex: 74 y.o. male   MRN: [id number]  Unit/Bed#: [facility] 919-01   Encounter: [id number]  Length of Stay: 2 days    ASSESSMENT:    Won Gaitan Jr. is a 74 y.o. male with:    Parks grade 1 ulceration plantar right foot with right foot   Cellulitis of the right foot, resolved.  Parks grade 1 ulceration left third toe PIPJ  Peripheral arterial disease  History of right foot TMA with free flap, history of left fourth partial ray amputation  Atrial fibrillation on Eliquis    PLAN:    Patient was seen and evaluated bedside  Of note right foot wound cellulitis has cleared.  Left third toe wound is stable and dry. No signs of infection of left foot lateral column.  Reviewed final culture results, final cultures grew 2+ Staph aureus with sensitivities to cephalosporins.  Reviewed radiographs with patient.  Left foot cortical reaction noted on lateral aspect of fifth metatarsal is clinically unimpressive with no history of wounds at that area and no signs of infection at this time.  Also patient had a left foot MRI 2 weeks ago that had no findings to that metatarsal. Will continue to monitor in outpatient setting  Patient stable to be discharged on short course of antibiotics with outpatient follow-up in the wound care clinic.  Elevation and offloading on green foam wedges or pillows when non-ambulatory.  Appreciate consulting services for recommendations and management.     Antibiotics: Keflex  Pharmacologic VTE Prophylaxis: Eliquis  Mechanical VTE Prophylaxis: sequential compression device   Weightbearing status: Weightbearing as tolerated    Disposition:  Patient stable for discharge today.    SUBJECTIVE     The patient was seen, evaluated, and assessed at bedside today. The patient was awake, alert, and in no acute distress. No acute events overnight. The patient reports no new podiatric concerns. Patient denies N/V/F/chills/SOB/CP.    Review of  Systems  Constitutional: Negative.    HENT: Negative.    Eyes: Negative.    Respiratory: Negative.    Cardiovascular: Negative.    Gastrointestinal: Negative.    Musculoskeletal: Negative  Skin: Left third digit wound, right midfoot plantar wound  Neurological: Polyneuropathy  Psych: Negative.    OBJECTIVE     Physical Exam:     General: Alert, cooperative and no distress  Lungs: Non labored breathing  Abdomen: Soft, non-tender.  Lower extremity exam:  Cardiovascular status at baseline.  Neurological status at baseline.  Musculoskeletal status at baseline. No calf tenderness noted bilaterally.     Wound #: 1  Location: Right plantar medial foot  Length 4 cm: Width 2 cm: Depth 0.1cm:   Deepest Tissue Noted in Base: Subcutaneous  Probe to Bone: No  Peripheral Skin Description: Attached  Granulation: 100% Fibrotic Tissue: 0% Necrotic Tissue: 0%   Drainage Amount: minimal, serous  Signs of Infection: Yes, negative probe to bone, negative crepitus, positive erythema, edema, negative purulence drainage, negative proximal tracking erythema    Wound #: 2  Location: Left dorsal third toe  Length 0.6cm: Width 0.5cm: Depth scabbed:   Deepest Tissue Noted in Base: Scabbed  Probe to Bone: No  Peripheral Skin Description: Attached  Granulation: 0% Fibrotic Tissue: 100% scab, necrotic Tissue: 0%   Drainage Amount: None  Signs of Infection: No    No clinical signs of infection or wound noted to the lateral aspect of the left foot.       Clinical Images 02/19/25:                DETAILS OF HOSPITAL COURSE     Won Gaitan Jr. is a 74 y.o. male who was admitted on 2/17/2025 for Right plantar foot DFU with cellulitis and left dorsal 3rd toe wound.  Patient was admitted under podiatry service for IV antibiotics and imaging.  Internal medicine was consulted for medical management.  Imaging of bilateral feet reviewed with the patient, no concern for osteomyelitis was noted.  Left foot cortical reaction noted to the left fifth metatarsal  on x-ray had no clinical concern, no contiguous wound base, no signs of infection. Wound cultures from the wound care clinic visit resulted as Staph aureus with sensitivities to tetracyclines, clindamycin, cefazolin.  Patient was discharged on 2/19/2025 with a short course of oral Keflex with outpatient follow-up for continued wound care.    New Medications:  - Keflex po    DISCHARGE INFORMATION     PCP at Discharge: Pieter Torres DO    Admitting Provider: Kris  Admission Date: 2/17/2025     Discharge Provider: Daphne  Discharge Date: 02/19/25    Discharge Disposition: Home  Discharge Condition: Good  Discharge with Lines: No  Activity Restrictions: WBAT  Test Results Pending at Discharge: None  Medications at Discharge: See after visit summary for reconciled discharge medications provided to patient and family.      Discharge Diagnoses:  Active Problems:    Cellulitis of right lower extremity    Hyperlipidemia    Type 2 diabetes mellitus with diabetic neuropathy (HCC)    Atrial fibrillation (HCC)    Essential hypertension    Ulcer of right foot with fat layer exposed (HCC)    Ambulatory dysfunction      Consulting Providers:  - Internal Medicine    Diagnostic & Therapeutic Procedures Performed:  XR foot 3+ vw left  Result Date: 2/19/2025  Impression: Linear calcific densities immediately adjacent to the fifth metatarsal shaft are more prominent than on the most recent prior study and new compared with older studies. While the appearance is somewhat atypical for periosteal reaction, the possibility of  osteomyelitis changes cannot be entirely excluded in this region. Consider follow-up MRI. The study was marked in EPIC for immediate notification. Computerized Assisted Algorithm (CAA) may have been used to analyze all applicable images. Workstation performed: [id number]     XR foot 3+ vw right  Result Date: 2/19/2025  Impression: No convincing evidence of osteomyelitis at this time. No soft tissue gas identified  "Computerized Assisted Algorithm (CAA) may have been used to analyze all applicable images. Workstation performed: [id number]       Code Status: Level 1 - Full Code  Advance Directive and Living Will:      Power of : Yes  POLST:      FOLLOW-UP       Active Issues Requiring Follow-Up: Bilateral foot wounds    Discharge Statement:  I spent 25 minutes discharging the patient. This time was spent on the day of discharge. I had direct contact with the patient on the day of discharge. Additional documentation is required if more than 30 minutes were spent on discharge.       Portions of the record may have been created with voice recognition software.  Occasional wrong word or \"sound a like\" substitutions may have occurred due to the inherent limitations of voice recognition software.  Read the chart carefully and recognize, using context, where substitutions have occurred.  ==  Raf Del Valle DPM   [facility]  Podiatric Medicine & Surgery    "

## 2025-02-19 NOTE — ASSESSMENT & PLAN NOTE
Lab Results   Component Value Date    HGBA1C 7.7 (H) 01/16/2025       Recent Labs     02/18/25  1636 02/18/25  1747 02/18/25  2048 02/19/25  0732   POCGLU 80 89 160* 182*       Blood Sugar Average: Last 72 hrs:  (P) 157.6787278944793784  HgbA1c 7.7 on 1/16/25. Uses transdermal continuous monitor. On insulin Lispro 20U TID, insulin glargine 30U HS, and metformin 750mg BID at home.  Hold home metformin  Lantus 15 units  Lispro 11 units with meals  Sliding scale insulin  Monitor glucose

## 2025-02-19 NOTE — PROGRESS NOTES
Progress Note - Internal Medicine   Name: Won Gaitan Jr. 74 y.o. male I MRN: [id number]  [facility] I Date of Admission: 2/17/2025   Date of Service: 2/19/2025 I Hospital Day: 2    Assessment & Plan  Cellulitis of right lower extremity  R foot and lower leg swollen and discolored, consistent with cellulitis.  Management per Podiatry team, currently on ancef  Hyperlipidemia  On atorvastatin 40mg at home.  Continue statin  Type 2 diabetes mellitus with diabetic neuropathy (HCC)  Lab Results   Component Value Date    HGBA1C 7.7 (H) 01/16/2025       Recent Labs     02/18/25  1636 02/18/25  1747 02/18/25  2048 02/19/25  0732   POCGLU 80 89 160* 182*       Blood Sugar Average: Last 72 hrs:  (P) 157.3183263899536494  HgbA1c 7.7 on 1/16/25. Uses transdermal continuous monitor. On insulin Lispro 20U TID, insulin glargine 30U HS, and metformin 750mg BID at home.  Hold home metformin  Lantus 15 units  Lispro 11 units with meals  Sliding scale insulin  Monitor glucose  Atrial fibrillation (HCC)  On Eliquis 5mg at home, scheduled to switch today 2/17 to Plavix due to financial considerations. Also on diltiazem 120mg q12h and Toprol BID at home.  Continue Eliquis 5mg  Continue home diltiazem and Toprol  Monitor for cardiac sx or changes  Would benefit from continuing DOAC rather than switching to Plavix.  Essential hypertension  On diltiazem 120mg q12h, lisinopril 40mg daily, and Toprol BID at home. Monitors BP at home, but is unsure of typical numbers.  Continue home meds  Monitor vitals  Ulcer of right foot with fat layer exposed (HCC)  Ulcer on R foot that had been healing but now question of infection.  Management per Podiatry team  Ambulatory dysfunction  Uses wheelchair at baseline. Hx of diabetic neuropathy.  Fall precautions  Management per Podiatry team      Subjective     Patient seen and examined. No acute events overnight.  Morning fasting glucose at 182, near goal.  Eating and drinking appropriately.  No  acute complaints. Hopeful to go home soon.     Objective :  Temp:  [97.5 °F (36.4 °C)-98.3 °F (36.8 °C)] 98.2 °F (36.8 °C)  HR:  [6-89] 89  BP: (110-165)/(67-98) 165/98  Resp:  [18] 18  SpO2:  [96 %-98 %] 97 %  O2 Device: None (Room air)    I/O       None          Weights:   IBW (Ideal Body Weight): 86.8 kg    Body mass index is 29.28 kg/m².  Weight (last 2 days)       Date/Time Weight    02/18/25 1601 109 (240.52)            Physical Exam  Vitals reviewed.   Constitutional:       General: He is not in acute distress.  HENT:      Head: Normocephalic and atraumatic.   Eyes:      Extraocular Movements: Extraocular movements intact.   Cardiovascular:      Rate and Rhythm: Normal rate. Rhythm irregular.   Pulmonary:      Effort: Pulmonary effort is normal.      Breath sounds: Normal breath sounds.   Abdominal:      General: There is distension.      Palpations: Abdomen is soft.      Tenderness: There is no abdominal tenderness.   Musculoskeletal:         General: Deformity present.      Right lower leg: Edema present.      Left lower leg: No edema.      Comments: Right lower extremity midfoot amputation.  Right calf with some erythema and swelling.    Skin:     General: Skin is warm and dry.      Capillary Refill: Capillary refill takes less than 2 seconds.   Neurological:      General: No focal deficit present.      Mental Status: He is alert and oriented to person, place, and time.   Psychiatric:         Mood and Affect: Mood normal.         Behavior: Behavior normal.           Lab Results: I have reviewed the following results:  Recent Labs     02/18/25  1734   WBC 8.95   HGB 14.2   HCT 44.4      SODIUM 138   K 3.8      CO2 28   BUN 16   CREATININE 1.14   GLUC 81             Currently Ordered Meds:   Current Facility-Administered Medications:     acetaminophen (TYLENOL) tablet 650 mg, BID    apixaban (ELIQUIS) tablet 5 mg, BID    atorvastatin (LIPITOR) tablet 40 mg, Daily With Dinner    ceFAZolin (ANCEF)  IVPB (premix in dextrose) 2,000 mg 50 mL, Q8H, Last Rate: 2,000 mg (02/19/25 0519)    [Held by provider] clopidogrel (PLAVIX) tablet 75 mg, Daily    diltiazem (CARDIZEM) tablet 120 mg, Q12H SUNG    escitalopram (LEXAPRO) tablet 10 mg, Daily    HYDROmorphone HCl (DILAUDID) injection 0.2 mg, Q2H PRN    insulin glargine (LANTUS) subcutaneous injection 15 Units 0.15 mL, HS    insulin lispro (HumALOG/ADMELOG) 100 units/mL subcutaneous injection 1-6 Units, TID AC **AND** Fingerstick Glucose (POCT), 4x Daily AC and at bedtime    insulin lispro (HumALOG/ADMELOG) 100 units/mL subcutaneous injection 11 Units, Daily With Breakfast    insulin lispro (HumALOG/ADMELOG) 100 units/mL subcutaneous injection 11 Units, Daily With Lunch    insulin lispro (HumALOG/ADMELOG) 100 units/mL subcutaneous injection 11 Units, Daily With Dinner    lisinopril (ZESTRIL) tablet 40 mg, Daily    naloxone (NARCAN) 0.04 mg/mL syringe 0.04 mg, Q1MIN PRN    ondansetron (ZOFRAN) injection 4 mg, Q6H PRN    oxyCODONE (ROXICODONE) split tablet 2.5 mg, Q4H PRN **OR** oxyCODONE (ROXICODONE) IR tablet 5 mg, Q4H PRN    senna (SENOKOT) tablet 8.6 mg, Daily    tamsulosin (FLOMAX) capsule 0.4 mg, Daily With Dinner  VTE Pharmacologic Prophylaxis: VTE covered by:  apixaban, Oral, 5 mg at 02/18/25 1638      VTE Mechanical Prophylaxis: sequential compression device    Administrative Statements     Portions of the record may have been created with voice recognition software.

## 2025-02-19 NOTE — PLAN OF CARE
Problem: PAIN - ADULT  Goal: Verbalizes/displays adequate comfort level or baseline comfort level  Description: Interventions:  - Encourage patient to monitor pain and request assistance  - Assess pain using appropriate pain scale  - Administer analgesics based on type and severity of pain and evaluate response  - Implement non-pharmacological measures as appropriate and evaluate response  - Consider cultural and social influences on pain and pain management  - Notify physician/advanced practitioner if interventions unsuccessful or patient reports new pain  Outcome: Progressing     Problem: INFECTION - ADULT  Goal: Absence or prevention of progression during hospitalization  Description: INTERVENTIONS:  - Assess and monitor for signs and symptoms of infection  - Monitor lab/diagnostic results  - Monitor all insertion sites, i.e. indwelling lines, tubes, and drains  - Monitor endotracheal if appropriate and nasal secretions for changes in amount and color  - [place] appropriate cooling/warming therapies per order  - Administer medications as ordered  - Instruct and encourage patient and family to use good hand hygiene technique  - Identify and instruct in appropriate isolation precautions for identified infection/condition  Outcome: Progressing  Goal: Absence of fever/infection during neutropenic period  Description: INTERVENTIONS:  - Monitor WBC    Outcome: Progressing     Problem: SAFETY ADULT  Goal: Patient will remain free of falls  Description: INTERVENTIONS:  - Educate patient/family on patient safety including physical limitations  - Instruct patient to call for assistance with activity   - Consult OT/PT to assist with strengthening/mobility   - Keep Call bell within reach  - Keep bed low and locked with side rails adjusted as appropriate  - Keep care items and personal belongings within reach  - Initiate and maintain comfort rounds  - Make Fall Risk Sign visible to staff  - Apply yellow socks and bracelet  for high fall risk patients  - Consider moving patient to room near nurses station  Outcome: Progressing  Goal: Maintain or return to baseline ADL function  Description: INTERVENTIONS:  -  Assess patient's ability to carry out ADLs; assess patient's baseline for ADL function and identify physical deficits which impact ability to perform ADLs (bathing, care of mouth/teeth, toileting, grooming, dressing, etc.)  - Assess/evaluate cause of self-care deficits   - Assess range of motion  - Assess patient's mobility; develop plan if impaired  - Assess patient's need for assistive devices and provide as appropriate  - Encourage maximum independence but intervene and supervise when necessary  - Involve family in performance of ADLs  - Assess for home care needs following discharge   - Consider OT consult to assist with ADL evaluation and planning for discharge  - Provide patient education as appropriate  Outcome: Progressing  Goal: Maintains/Returns to pre admission functional level  Description: INTERVENTIONS:  - Perform AM-PAC 6 Click Basic Mobility/ Daily Activity assessment daily.  - Set and communicate daily mobility goal to care team and patient/family/caregiver.   - Out of bed for toileting  - Record patient progress and toleration of activity level   Outcome: Progressing     Problem: DISCHARGE PLANNING  Goal: Discharge to home or other facility with appropriate resources  Description: INTERVENTIONS:  - Identify barriers to discharge w/patient and caregiver  - Arrange for needed discharge resources and transportation as appropriate  - Identify discharge learning needs (meds, wound care, etc.)  - Arrange for interpretive services to assist at discharge as needed  - Refer to Case Management Department for coordinating discharge planning if the patient needs post-hospital services based on physician/advanced practitioner order or complex needs related to functional status, cognitive ability, or social support  system  Outcome: Progressing     Problem: Knowledge Deficit  Goal: Patient/family/caregiver demonstrates understanding of disease process, treatment plan, medications, and discharge instructions  Description: Complete learning assessment and assess knowledge base.  Interventions:  - Provide teaching at level of understanding  - Provide teaching via preferred learning methods  Outcome: Progressing

## 2025-02-19 NOTE — PLAN OF CARE
Problem: PAIN - ADULT  Goal: Verbalizes/displays adequate comfort level or baseline comfort level  Description: Interventions:  - Encourage patient to monitor pain and request assistance  - Assess pain using appropriate pain scale  - Administer analgesics based on type and severity of pain and evaluate response  - Implement non-pharmacological measures as appropriate and evaluate response  - Consider cultural and social influences on pain and pain management  - Notify physician/advanced practitioner if interventions unsuccessful or patient reports new pain  2/18/2025 2221 by Melonie Broussard RN  Outcome: Progressing  2/18/2025 1608 by Melonie Broussard RN  Outcome: Progressing

## 2025-02-20 ENCOUNTER — TRANSITIONAL CARE MANAGEMENT (OUTPATIENT)
Dept: FAMILY MEDICINE CLINIC | Facility: CLINIC | Age: 75
End: 2025-02-20

## 2025-02-20 NOTE — UTILIZATION REVIEW
NOTIFICATION OF ADMISSION DISCHARGE   This is a Notification of Discharge from [facility]. Please be advised that this patient has been discharge from our facility. Below you will find the admission and discharge date and time including the patient’s disposition.   UTILIZATION REVIEW CONTACT:  Lee Pal  Utilization   Network Utilization Review Department  Phone: [phone] x carefully listen to the prompts. All voicemails are confidential.  Email: [email or web address]     ADMISSION INFORMATION  PRESENTATION DATE: 2/17/2025  4:06 PM  OBERVATION ADMISSION DATE: N/A  INPATIENT ADMISSION DATE: 2/17/25  5:28 PM   DISCHARGE DATE: 2/19/2025 11:40 AM   DISPOSITION:Home/Self Care    Network Utilization Review Department  ATTENTION: Please call with any questions or concerns to [phone] and carefully listen to the prompts so that you are directed to the right person. All voicemails are confidential.   For Discharge needs, contact Care Management DC Support Team at [phone] opt. [phone]  Send all requests for admission clinical reviews, approved or denied determinations and any other requests to dedicated fax number below belonging to the campus where the patient is receiving treatment. List of dedicated fax numbers for the Facilities:  FACILITY NAME UR FAX NUMBER   ADMISSION DENIALS (Administrative/Medical Necessity) [phone]   DISCHARGE SUPPORT TEAM ([facility]) [phone]   PARENT CHILD HEALTH (Maternity/NICU/Pediatrics) [phone]   [facility] [phone]   [facility] [phone]   Critical access hospital [phone]   [facility] [phone]   [facility] [phone]   [facility] [phone]   [facility] [phone]   [facility] [phone]   [facility]  [facility] [phone]   [facility] [phone]   [facility] [phone]   [facility] [phone]

## 2025-02-23 ENCOUNTER — RA CDI HCC (OUTPATIENT)
Dept: OTHER | Facility: HOSPITAL | Age: 75
End: 2025-02-23

## 2025-02-24 ENCOUNTER — OFFICE VISIT (OUTPATIENT)
Dept: WOUND CARE | Facility: CLINIC | Age: 75
End: 2025-02-24
Payer: COMMERCIAL

## 2025-02-24 VITALS
HEART RATE: 76 BPM | DIASTOLIC BLOOD PRESSURE: 88 MMHG | RESPIRATION RATE: 18 BRPM | SYSTOLIC BLOOD PRESSURE: 140 MMHG | TEMPERATURE: 97.8 F

## 2025-02-24 DIAGNOSIS — L97.521 ULCER OF TOE OF LEFT FOOT, LIMITED TO BREAKDOWN OF SKIN (HCC): ICD-10-CM

## 2025-02-24 DIAGNOSIS — E11.40 TYPE 2 DIABETES MELLITUS WITH DIABETIC NEUROPATHY, UNSPECIFIED WHETHER LONG TERM INSULIN USE (HCC): ICD-10-CM

## 2025-02-24 DIAGNOSIS — L97.412 ULCER OF HEEL AND MIDFOOT, RIGHT, WITH FAT LAYER EXPOSED (HCC): Primary | ICD-10-CM

## 2025-02-24 PROCEDURE — 11042 DBRDMT SUBQ TIS 1ST 20SQCM/<: CPT | Performed by: PODIATRIST

## 2025-02-24 PROCEDURE — 99213 OFFICE O/P EST LOW 20 MIN: CPT | Performed by: PODIATRIST

## 2025-02-24 RX ORDER — LIDOCAINE 40 MG/G
CREAM TOPICAL ONCE
Status: COMPLETED | OUTPATIENT
Start: 2025-02-24 | End: 2025-02-24

## 2025-02-24 RX ADMIN — LIDOCAINE 1 APPLICATION: 40 CREAM TOPICAL at 10:26

## 2025-02-24 NOTE — PATIENT INSTRUCTIONS
Orders Placed This Encounter   Procedures    Wound cleansing and dressings Diabetic Ulcer Anterior;Left Toe D3, third     Wound cleansing and dressings     Wash your hands with soap and water.  Remove old dressing, discard into plastic bag and place in trash.  Cleanse the wound with gentle unscented soap and water prior to applying a clean dressing. Do not use tissue or cotton balls. Do not scrub the wound. Pat dry using gauze.    Shower no     Apply acticoat 3 to the left third toe wound.  Cover with gauze  Secure with roll gauze  Change dressing 3x week      Off-loading Instructions:    Keep weight and pressure off wound at all times.   Wear off-loading device as directed by your physician. Put on immediately when rising in the morning and remove when going to bed.  Wear sugical shoe or open toed shoe. Ensure that there is no rubbing to wound.   Limit your walking     Follow up at the wound center in one week.     Standing Status:   Future     Expiration Date:   3/3/2025    Wound cleansing and dressings Diabetic Ulcer Right;Plantar Foot     Wound cleansing and dressings Diabetic Ulcer Right;Plantar Foot    Wash your hands with soap and water.  Remove old dressing, discard into plastic bag and place in trash.  Cleanse the wound with gentle unscnted soap and water prior to applying a clean dressing. Do not use tissue or cotton balls. Do not scrub the wound. Pat dry using gauze.    Shower no     Apply acticoat 3 to the right foot wound.  Cover with ABD  Secure with roll gauze  Change dressing 3x week     Elastic Tubular Stocking Spandagrip F    Tubular elastic bandage: Apply from base of toes to behind the knee. Apply in AM, may remove for sleep.  Avoid prolonged standing in one place.  Elevate leg(s) above the level of the heart when sitting or as much as possible.     Off-loading Instructions:    Keep weight and pressure off wound at all times.  Wear off-loading device as directed by your physician. Put on  immediately when rising in the morning and remove when going to bed.  Limit walking as much as possible!  Use wheelchair.   Apply CAM boot to right foot   Dobule 1/4 inch felt padding to heel      Return to wound center in 1 week     Standing Status:   Future     Expiration Date:   3/3/2025

## 2025-02-24 NOTE — PROGRESS NOTES
Wound Procedure Treatment Diabetic Ulcer Anterior;Left Toe D3, third    Performed by: Pinky Gonzalez RN  Authorized by: Satnam Ponce DPM  Associated wounds:   Wound 12/30/24 Diabetic Ulcer Toe D3, third Anterior;Left    Wound cleansed with:  NSS   Applied primary dressing:  Acticoat   Applied secondary dressing:  Gauze   Dressing secured with:  Kacy and Tape   Comments:  Acticoat 3 to wound base   Wound Procedure Treatment Diabetic Ulcer Right;Plantar Foot    Performed by: Pinky Gonzalez RN  Authorized by: Satnam Ponce DPM  Associated wounds:   Wound 12/16/24 Diabetic Ulcer Foot Right;Plantar    Wound cleansed with:  Soap and water   Applied primary dressing:  Acticoat   Applied secondary dressing:  Gauze   Dressing secured with:  Kacy, Elastic tubular stocking and Size F   Offloading device appllied:  Felt padding 1/4 inch   Comments:  Acticoat 3 to wound base    Double 1/4 inch felt padding to heel

## 2025-02-24 NOTE — PROGRESS NOTES
Patient ID: Won Gaitan Jr. is a 74 y.o. male Date of Birth 1950       Chief Complaint   Patient presents with    Follow Up Wound Care Visit       Allergies:  Simvastatin and Itraconazole    Assessments:      Diagnosis ICD-10-CM Associated Orders   1. Ulcer of heel and midfoot, right, with fat layer exposed ([facility])  L97.412 lidocaine (LMX) 4 % cream     Wound cleansing and dressings Diabetic Ulcer Right;Plantar Foot     Wound Procedure Treatment Diabetic Ulcer Right;Plantar Foot     Debridement Diabetic Ulcer Right;Plantar Foot      2. Ulcer of toe of left foot, limited to breakdown of skin ([facility])  L97.521 Wound cleansing and dressings Diabetic Ulcer Anterior;Left Toe D3, third     lidocaine (LMX) 4 % cream     Wound Procedure Treatment Diabetic Ulcer Anterior;Left Toe D3, third      3. Type 2 diabetes mellitus with diabetic neuropathy, unspecified whether long term insulin use ([facility])  E11.40 Debridement Diabetic Ulcer Right;Plantar Foot                 Plan:  Reviewed medical records.  Reviewed images, labs, and podiatry notes from previous admission.  Patient was counseled and educated on the condition and the diagnosis.    2.  The diagnosis, treatment options and prognosis were discussed with the patient.    3.  Wounds look stable and cellulitis resolved.  Stressed on pt compliance about proper off-loading and staying off of his feet.  Discussed risks related to his condition and non-compliance including more amputation / infection.  Pt not amenable for TCC on right foot today.  Use CAM boot on right foot this week and surgical shoe on left.     4. Local care with Acticoat.    5. Spandigrip for compression of legs.  Instructed proper elevation of legs.    6. RA in 1 week.         Imaging: I have personally reviewed pertinent films in PACS  Labs, pathology, and Other Studies: I have personally reviewed pertinent reports.        Debridement   Wound 12/16/24 Diabetic Ulcer Foot Right;Plantar    Universal  "Protocol:  Consent: Verbal consent obtained.  Risks and benefits: risks, benefits and alternatives were discussed  Consent given by: patient  Time out: Immediately prior to procedure a \"time out\" was called to verify the correct patient, procedure, equipment, support staff and site/side marked as required.  Timeout called at: 2/24/2025 10:44 AM.  Patient understanding: patient states understanding of the procedure being performed  Patient identity confirmed: verbally with patient    Debridement Details  Performed by: physician  Debridement type: surgical  Level of debridement: subcutaneous tissue  Pain control: lidocaine 4%      Post-debridement measurements  Length (cm): 1.4  Width (cm): 3.4  Depth (cm): 0.2  Percent debrided: 100%  Surface Area (cm^2): 4.76  Area Debrided (cm^2): 4.76  Volume (cm^3): 0.95    Tissue and other material debrided: dermis, epidermis and subcutaneous tissue  Devitalized tissue debrided: fibrin and slough  Instrument(s) utilized: blade  Technique utilized: excisionalBleeding: small  Hemostasis obtained with: pressure  Procedural pain (0-10): 0  Post-procedural pain: 0   Response to treatment: procedure was tolerated well           Subjective:     HPI  The patient presents for evaluation and treatment of wounds in both feet.  He was in [facility] for IV antibiotics treating cellulitis.  He feels well.  Redness and swelling resolved.  No pain.  No new complaint.  No compliant about off-loading.  He presents with diabetic shoes on both feet today.        The following portions of the patient's history were reviewed and updated as appropriate:   Patient Active Problem List   Diagnosis    Hyperlipidemia    Type 2 diabetes mellitus with diabetic neuropathy (HCC)    Atrial fibrillation (HCC)    Right middle lobe pulmonary nodule    Type 2 diabetes mellitus with other skin ulcer (CODE) (HCC)    Essential hypertension    Benign prostatic hyperplasia with lower urinary tract symptoms    Microalbuminuria " "   Ulcer of right foot with fat layer exposed (HCC)    Ambulatory dysfunction    S/P transmetatarsal amputation of foot, right (HCC)    Sleep disturbance    Anxiety and depression    S/P R ALT flap to right foot    Gait abnormality    Vasomotor rhinitis    Corns and callosities    Tinea unguium    Cellulitis of left lower leg    Puncture wound of left foot with foreign body, subsequent encounter    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 (HCC)    Personal history of COVID-19    Bronchitis    S/P amputation of lesser toe, left (HCC)    Ulcer of toe of left foot, limited to breakdown of skin (HCC)    Medication management    Subconjunctival hemorrhage of right eye     Past Medical History:   Diagnosis Date    Arthritis     Atrial fibrillation (HCC)     Diagnosed 11/2018    Benign prostate hyperplasia 04/2002    Cellulitis     right lower leg     Colon polyp 2006    Diabetes mellitus (HCC)     Hearing aid worn     bilat    Hearing loss     90% loss left ear and 40% right ear    History of clubfoot     \"since birth\"    History of pneumonia     History of TIA (transient ischemic attack) 04/25/2017 11/30/18 pt denies    Hyperlipidemia 5/15/2014    Hypertension     Infectious viral hepatitis     \"cant remember type\"    Irregular heart beat     Afib    Neuropathy     both feet    Osteomyelitis (HCC)     right great toe    Pneumonia     Right middle lobe pulmonary nodule 11/6/2018    Seasickness     Teeth missing     Type 2 diabetes mellitus with diabetic neuropathy (HCC) 11/6/2018    Wears glasses     reading    Wound, open     bottom of right foot     Past Surgical History:   Procedure Laterality Date    BUNIONECTOMY Right 12/4/2018    Procedure: 5TH METATARSAL BONE PARTIAL RESECTION, FULL THICKNESS DEBRIDEMENT OF DIABETIC ULCER;  Surgeon: Shala Flynn DPM;  [facility];  Service: Podiatry    CLOSURE DELAYED PRIMARY Left 10/30/2023    Procedure: " CLOSURE DELAYED PRIMARY left foot;  Surgeon: Minh Urena DPM;  [facility];  Service: Podiatry    CLUB FOOT RELEASE Bilateral     COLONOSCOPY      COMPLEX WOUND CLOSURE TO EXTREMITY  4/27/2021    Procedure: COMPLEX WOUND CLOSURE TO EXTREMITY: RIGHT FOOT;  Surgeon: Niraj Bojorquez MD;  [facility];  Service: Plastics    EXTERNAL FIXATOR APPLICATION Right 2/12/2021    Procedure: Application multiplane external fixation;  Surgeon: Harry Zamudio DPM;  [facility];  Service: Podiatry    FOOT HARDWARE REMOVAL Right 2/17/2021    Procedure: REMOVAL EXTERNAL FIXATOR;  Surgeon: Harry Zamudio DPM;  [facility];  Service: Podiatry    FOREIGN BODY REMOVAL Right 4/27/2021    Procedure: REMOVAL FOREIGN BODY EXTREMITY: RIGHT FOOT;  Surgeon: Niraj Bojorquez MD;  [facility];  Service: Plastics    INCISION AND DRAINAGE OF WOUND Right 2/17/2021    Procedure: INCISION AND DRAINAGE (I&D) EXTREMITY;  Surgeon: Harry Zamudio DPM;  [facility];  Service: Podiatry    INCISION AND DRAINAGE OF WOUND Left 10/26/2023    Procedure: INCISION AND DRAINAGE (I&D) EXTREMITY; WASHOUT; REMOVAL OF FOREIGN BODY;  Surgeon: Satnam Ponce DPM;  [facility];  Service: Podiatry    ORIF FOOT FRACTURE Right 3/4/2021    Procedure: VAC PLACEMENT; SCREW FIXATION RIGHT FOOT FUSION;  Surgeon: Harry Zamudio DPM;  [facility];  Service: Podiatry    ID AMPUTATION FOOT TRANSMETARSAL Right 2/22/2021    Procedure: AMPUTATION TRANSMETATARSAL (TMA), REMOVAL NAIL IM T2 ICF HARDWARE;  Surgeon: Harry Zamudio DPM;  [facility];  Service: Podiatry    ID AMPUTATION METATARSAL W/TOE SINGLE Left 11/29/2023    Procedure: LEFT FOURTH RAY RESECTION FOOT;  Surgeon: Harry Zamudio DPM;  [facility];  Service: Podiatry    ID ARTHRD MIDTARSL/TARS MLT/TRANSVRS W/OSTEOT Right 2/12/2021    Procedure: foot ARTHRODESIS/FUSION;  Surgeon: Harry Zamudio DPM;  [facility];  Service: Podiatry    SC DEBRIDEMENT BONE 1ST 20 SQ CM/< Right 12/2/2021    Procedure: DEBRIDEMENT OF TARSAL BONES WITH ANITBIODIC BEADS;  Surgeon: Harry Zamudio DPM;  [facility];  Service: Podiatry    SC LNGTH/SHRT TENDON LEG/ANKLE 1 TENDON SPX Right 2/12/2021    Procedure: LENGTHEN TIBIAL TENDON, TRANS HEEL CORD;  Surgeon: Harry Zamudio DPM;  [facility];  Service: Podiatry    SC MUSC MYOCUTANEOUS/FASCIOCUTANEOUS FLAP TRUNK Right 4/12/2021    Procedure: RECONSTRUCTION MICROSURGICAL W/ FREE FLAP;  Surgeon: Niraj Bojorquez MD;  [facility];  Service: Plastics    SC MUSC MYOCUTANEOUS/FASCIOCUTANEOUS FLAP TRUNK Right 4/12/2021    Procedure: TAKEBACK RECONSTRUCTION MICROSURGICAL W/ FREE FLAP;  Surgeon: Niraj Bojorquez MD;  [facility];  Service: Plastics    SC MUSC MYOCUTANEOUS/FASCIOCUTANEOUS FLAP TRUNK Right 4/13/2021    Procedure: RECONSTRUCTION MICROSURGICAL W/ FREE FLAP, RE EXPLORATION, MICRO VASCULAR REVISION;  Surgeon: Niraj Bojorquez MD;  [facility];  Service: Plastics    SC OSTC PRTL EXOSTC/CONDYLC METAR HEAD Right 12/29/2020    Procedure: EXCISION EXOSTOSIS;  Surgeon: Harry Zamudio DPM;  [facility];  Service: Podiatry    SC SECONDARY CLOSURE SURG WOUND/DEHSN XTNSV/COMP Right 12/29/2020    Procedure: PRIMARY DELAYED CLOSURE;  Surgeon: Harry Zamudio DPM;  [facility];  Service: Podiatry    TOE AMPUTATION Right     partial great toe    TONSILLECTOMY      VAC DRESSING APPLICATION Right 3/1/2021    Procedure: APPLICATION VAC DRESSING EXTREMITY;  Surgeon: Niraj Bojorquez MD;  [facility];  Service: Plastics    VASECTOMY  1992    WOUND DEBRIDEMENT Right 3/1/2021    Procedure: DEBRIDEMENT LOWER EXTREMITY (WASH OUT);  Surgeon: Niraj Bojorquez MD;  [facility];  Service: Plastics    WOUND DEBRIDEMENT Right 4/7/2021    Procedure: DEBRIDEMENT RIGHT FOOT;  Surgeon: Niraj BAINS  Jace Bojorquez MD;  [facility];  Service: Plastics    WOUND DEBRIDEMENT Right 2021    Procedure: DEBRIDEMENT LOWER EXTREMITY (WASH OUT);  Surgeon: Niraj Bojorquez MD;  [facility];  Service: Plastics    WOUND DEBRIDEMENT Right 2021    Procedure: DEBRIDEMENT LOWER EXTREMITY (WASH OUT): RIGHT FOOT;  Surgeon: Niraj Bojorquez MD;  [facility];  Service: Plastics     Family History   Problem Relation Age of Onset    Diabetes Father         mellitus      Social History     Socioeconomic History    Marital status: /Civil Union     Spouse name: None    Number of children: None    Years of education: None    Highest education level: None   Occupational History    None   Tobacco Use    Smoking status: Former     Current packs/day: 0.00     Types: Cigarettes     Quit date: 1988     Years since quittin.3     Passive exposure: Past    Smokeless tobacco: Former    Tobacco comments:     exposure to passive smoke   Vaping Use    Vaping status: Never Used   Substance and Sexual Activity    Alcohol use: Yes     Comment: occasionally    Drug use: Not Currently    Sexual activity: Yes     Partners: Female   Other Topics Concern    None   Social History Narrative    None     Social Drivers of Health     Financial Resource Strain: Not on file   Food Insecurity: No Food Insecurity (2025)    Nursing - Inadequate Food Risk Classification     Worried About Running Out of Food in the Last Year: Never true     Ran Out of Food in the Last Year: Never true     Ran Out of Food in the Last Year: Never true   Transportation Needs: No Transportation Needs (2025)    Nursing - Transportation Risk Classification     Lack of Transportation: Not on file     Lack of Transportation: No   Physical Activity: Not on file   Stress: Not on file   Social Connections: Not on file   Intimate Partner Violence: Unknown (2025)    Nursing IPS     Feels Physically and Emotionally  Safe: Not on file     Physically Hurt by Someone: Not on file     Humiliated or Emotionally Abused by Someone: Not on file     Physically Hurt by Someone: No     Hurt or Threatened by Someone: No   Housing Stability: Unknown (2025)    Nursing: Inadequate Housing Risk Classification     Has Housing: Not on file     Worried About Losing Housing: Not on file     Unable to Get Utilities: Not on file     Unable to Pay for Housing in the Last Year: No     Has Housin        Current Outpatient Medications:     acetaminophen (Mapap Arthritis Pain) 650 mg CR tablet, Take 1 tablet (650 mg total) by mouth 2 (two) times a day, Disp: 60 tablet, Rfl: 1    atorvastatin (LIPITOR) 40 mg tablet, Take 1 tablet (40 mg total) by mouth daily, Disp: 90 tablet, Rfl: 1    cephalexin (KEFLEX) 500 mg capsule, Take 1 capsule (500 mg total) by mouth every 6 (six) hours for 5 days, Disp: 20 capsule, Rfl: 0    Continuous Glucose Sensor (FreeStyle Taylor 3 Sensor) MISC, Use 1 each every 14 (fourteen) days, Disp: 1 each, Rfl: 0    diltiazem (CARDIZEM) 120 MG tablet, Take 1 tablet (120 mg total) by mouth every 12 (twelve) hours, Disp: 180 tablet, Rfl: 1    Eliquis 5 MG, Take 1 tablet (5 mg total) by mouth 2 (two) times a day, Disp: 60 tablet, Rfl: 11    escitalopram (LEXAPRO) 10 mg tablet, Take 1 tablet (10 mg total) by mouth daily, Disp: 90 tablet, Rfl: 0    glucose blood (Accu-Chek Guide) test strip, USE DAILY (Patient not taking: Reported on 2025), Disp: 100 strip, Rfl: 2    Insulin Glargine Solostar (Lantus SoloStar) 100 UNIT/ML SOPN, Inject 0.3 mL (30 Units total) under the skin daily at bedtime, Disp: 45 mL, Rfl: 1    insulin lispro (HumaLOG) 100 units/mL injection pen, Inject up to 20 Units under the skin 3 (three) times a day with meals, Disp: 15 mL, Rfl: 5    Insulin Pen Needle (Easy Comfort Pen Needles) 33G X 5 MM MISC, USE AS DIRECTED, Disp: 100 each, Rfl: 4    Lancets (onetouch ultrasoft) lancets, Use as instructed (Patient  not taking: Reported on 2/17/2025), Disp: 100 each, Rfl: 3    Lancets (onetouch ultrasoft) lancets, Use daily (Patient not taking: Reported on 2/17/2025), Disp: 100 each, Rfl: 3    lisinopril (ZESTRIL) 40 mg tablet, Take 1 tablet (40 mg total) by mouth daily, Disp: 90 tablet, Rfl: 1    metFORMIN (GLUCOPHAGE-XR) 750 mg 24 hr tablet, Take 1 tablet (750 mg total) by mouth 2 (two) times a day with meals, Disp: 180 tablet, Rfl: 1    metoprolol succinate (TOPROL-XL) 100 mg 24 hr tablet, TAKE 1 TABLET (100 MG TOTAL) BY MOUTH 2 (TWO) TIMES A DAY, Disp: 180 tablet, Rfl: 0    mupirocin (BACTROBAN) 2 % ointment, Apply topically 3 (three) times a day (Patient not taking: Reported on 2/17/2025), Disp: 15 g, Rfl: 0    tamsulosin (FLOMAX) 0.4 mg, Take 1 capsule (0.4 mg total) by mouth daily with dinner, Disp: 90 capsule, Rfl: 1  No current facility-administered medications for this visit.    Review of Systems   Constitutional:  Negative for chills and fever.   Respiratory:  Negative for shortness of breath.    Cardiovascular:  Negative for chest pain.   Gastrointestinal:  Negative for nausea and vomiting.   Skin:  Positive for wound.   Neurological:  Positive for numbness. Negative for weakness.       Objective:  /88   Pulse 76   Temp 97.8 °F (36.6 °C)   Resp 18   Pain Score: 0-No pain     Physical Exam  Vitals and nursing note reviewed.   Constitutional:       General: He is not in acute distress.     Appearance: He is not toxic-appearing or diaphoretic.   Cardiovascular:      Rate and Rhythm: Normal rate and regular rhythm.      Pulses:           Dorsalis pedis pulses are 1+ on the right side and 1+ on the left side.        Posterior tibial pulses are 0 on the right side and 0 on the left side.   Pulmonary:      Effort: Pulmonary effort is normal. No respiratory distress.   Musculoskeletal:         General: Deformity present. No signs of injury.      Right lower leg: Edema present.      Left lower leg: Edema present.       Right foot: No foot drop.      Left foot: No foot drop.      Comments: S/P partial foot amputation right.   Skin:     General: Skin is warm.      Capillary Refill: Capillary refill takes less than 2 seconds.      Coloration: Skin is not cyanotic or mottled.      Findings: Wound present. No abscess.      Nails: There is no clubbing.      Comments: Parks 1 ulcer noted left 3rd toe.  It is smaller.  Wound bed is granular.  No significant redness or swelling.  No exposed bone.  Parks 1 ulcer right plantar foot.  Wound bed is granular.  Decrease in size.  No deep probing.  Redness and warmth in right foot resolved.  See wound assessment and photo.   Neurological:      General: No focal deficit present.      Mental Status: He is alert and oriented to person, place, and time.      Cranial Nerves: No cranial nerve deficit.      Sensory: Sensory deficit present.      Motor: No weakness.      Coordination: Coordination normal.   Psychiatric:         Mood and Affect: Mood normal.         Behavior: Behavior normal.         Thought Content: Thought content normal.         Judgment: Judgment normal.           Wound 12/16/24 Diabetic Ulcer Foot Right;Plantar (Active)   Enter Parks score: Parks Grade 1: Partial or full-thickness ulcer (superficial) 02/24/25 1019   Wound Image   02/24/25 1017   Wound Description Granulation tissue;Pink 02/24/25 1019   Lavern-wound Assessment Intact;Dry;Callus 02/24/25 1019   Wound Length (cm) 1.3 cm 02/24/25 1019   Wound Width (cm) 3.3 cm 02/24/25 1019   Wound Depth (cm) 0.2 cm 02/24/25 1019   Wound Surface Area (cm^2) 4.29 cm^2 02/24/25 1019   Wound Volume (cm^3) 0.858 cm^3 02/24/25 1019   Calculated Wound Volume (cm^3) 0.86 cm^3 02/24/25 1019   Change in Wound Size % -290.91 02/24/25 1019   Number of underminings 1 02/17/25 0954   Undermining 1 3.1 02/17/25 0954   Undermining 1 is depth extending from circumfrential, deepest @ 3 o'clock, (toes 6 o'clock) 02/17/25 0954   Drainage Amount  Moderate 02/24/25 1019   Drainage Description Tan 02/24/25 1019   Non-staged Wound Description Full thickness 02/24/25 1019   Wound packed? No 01/13/25 0939   Dressing Status Removed 02/17/25 0954       Wound 12/30/24 Diabetic Ulcer Toe D3, third Anterior;Left (Active)   Enter Parks score: Parks Grade 1: Partial or full-thickness ulcer (superficial) 02/24/25 1020   Wound Image   02/24/25 1046   Wound Description [place];Pale 02/24/25 1020   Lavern-wound Assessment Intact;White 02/24/25 1020   Wound Length (cm) 0.6 cm 02/24/25 1020   Wound Width (cm) 0.8 cm 02/24/25 1020   Wound Depth (cm) 0.1 cm 02/24/25 1020   Wound Surface Area (cm^2) 0.48 cm^2 02/24/25 1020   Wound Volume (cm^3) 0.048 cm^3 02/24/25 1020   Calculated Wound Volume (cm^3) 0.05 cm^3 02/24/25 1020   Change in Wound Size % 16.67 02/24/25 1020   Number of underminings 1 02/17/25 0955   Undermining 1 0.2 02/17/25 0955   Undermining 1 is depth extending from 11 o'clock to 2 o'clock, deepest @ 1 02/17/25 0955   Drainage Amount Small 02/24/25 1020   Drainage Description Serous;Yellow 02/24/25 1020   Non-staged Wound Description Full thickness 02/24/25 1020   Wound packed? No 01/13/25 0939   Dressing Status Removed 02/17/25 0955                Results from last 6 Months   Lab Units 02/17/25  1118   WOUND CULTURE  2+ Growth of Staphylococcus aureus*         Lab Results   Component Value Date    HGBA1C 7.7 (H) 01/16/2025       Wound Instructions:  Orders Placed This Encounter   Procedures    Wound cleansing and dressings Diabetic Ulcer Anterior;Left Toe D3, third     Wound cleansing and dressings     Wash your hands with soap and water.  Remove old dressing, discard into plastic bag and place in trash.  Cleanse the wound with gentle unscented soap and water prior to applying a clean dressing. Do not use tissue or cotton balls. Do not scrub the wound. Pat dry using gauze.    Shower no     Apply acticoat 3 to the left third toe wound.  Cover with gauze  Secure with  roll gauze  Change dressing 3x week      Off-loading Instructions:    Keep weight and pressure off wound at all times.   Wear off-loading device as directed by your physician. Put on immediately when rising in the morning and remove when going to bed.  Wear sugical shoe or open toed shoe. Ensure that there is no rubbing to wound.   Limit your walking     Follow up at the wound center in one week.     Standing Status:   Future     Expiration Date:   3/3/2025    Wound cleansing and dressings Diabetic Ulcer Right;Plantar Foot     Wound cleansing and dressings Diabetic Ulcer Right;Plantar Foot    Wash your hands with soap and water.  Remove old dressing, discard into plastic bag and place in trash.  Cleanse the wound with gentle unscnted soap and water prior to applying a clean dressing. Do not use tissue or cotton balls. Do not scrub the wound. Pat dry using gauze.    Shower no     Apply acticoat 3 to the right foot wound.  Cover with ABD  Secure with roll gauze  Change dressing 3x week     Elastic Tubular Stocking Spandagrip F    Tubular elastic bandage: Apply from base of toes to behind the knee. Apply in AM, may remove for sleep.  Avoid prolonged standing in one place.  Elevate leg(s) above the level of the heart when sitting or as much as possible.     Off-loading Instructions:    Keep weight and pressure off wound at all times.  Wear off-loading device as directed by your physician. Put on immediately when rising in the morning and remove when going to bed.  Limit walking as much as possible!  Use wheelchair.   Apply CAM boot to right foot   Dobule 1/4 inch felt padding to heel      Return to wound center in 1 week     Standing Status:   Future     Expiration Date:   3/3/2025    Wound Procedure Treatment Diabetic Ulcer Anterior;Left Toe D3, third     This order was created via procedure documentation    Wound Procedure Treatment Diabetic Ulcer Right;Plantar Foot     This order was created via procedure  documentation    Debridement Diabetic Ulcer Right;Plantar Foot     This order was created via procedure documentation             Satnam Ponce DPM

## 2025-02-28 ENCOUNTER — OFFICE VISIT (OUTPATIENT)
Dept: FAMILY MEDICINE CLINIC | Facility: CLINIC | Age: 75
End: 2025-02-28
Payer: COMMERCIAL

## 2025-02-28 VITALS
DIASTOLIC BLOOD PRESSURE: 74 MMHG | OXYGEN SATURATION: 98 % | WEIGHT: 251.8 LBS | TEMPERATURE: 97.5 F | BODY MASS INDEX: 30.66 KG/M2 | HEIGHT: 76 IN | HEART RATE: 62 BPM | SYSTOLIC BLOOD PRESSURE: 128 MMHG

## 2025-02-28 DIAGNOSIS — F32.A ANXIETY AND DEPRESSION: ICD-10-CM

## 2025-02-28 DIAGNOSIS — E11.40 TYPE 2 DIABETES MELLITUS WITH DIABETIC NEUROPATHY, WITHOUT LONG-TERM CURRENT USE OF INSULIN (HCC): ICD-10-CM

## 2025-02-28 DIAGNOSIS — I48.91 ATRIAL FIBRILLATION, UNSPECIFIED TYPE (HCC): ICD-10-CM

## 2025-02-28 DIAGNOSIS — J06.9 VIRAL URI WITH COUGH: ICD-10-CM

## 2025-02-28 DIAGNOSIS — L03.116 CELLULITIS OF LEFT LOWER LEG: ICD-10-CM

## 2025-02-28 DIAGNOSIS — Z98.890 S/P FLAP GRAFT: Primary | ICD-10-CM

## 2025-02-28 DIAGNOSIS — F41.9 ANXIETY AND DEPRESSION: ICD-10-CM

## 2025-02-28 PROCEDURE — 99495 TRANSJ CARE MGMT MOD F2F 14D: CPT | Performed by: FAMILY MEDICINE

## 2025-02-28 RX ORDER — ESCITALOPRAM OXALATE 20 MG/1
20 TABLET ORAL DAILY
Qty: 90 TABLET | Refills: 0 | Status: SHIPPED | OUTPATIENT
Start: 2025-02-28

## 2025-02-28 RX ORDER — PROMETHAZINE HYDROCHLORIDE 6.25 MG/5ML
12.5 SYRUP ORAL EVERY 6 HOURS PRN
Qty: 118 ML | Refills: 0 | Status: SHIPPED | OUTPATIENT
Start: 2025-02-28

## 2025-02-28 RX ORDER — METFORMIN HYDROCHLORIDE 750 MG/1
750 TABLET, EXTENDED RELEASE ORAL 2 TIMES DAILY WITH MEALS
Qty: 180 TABLET | Refills: 1 | Status: SHIPPED | OUTPATIENT
Start: 2025-02-28

## 2025-02-28 RX ORDER — CLOPIDOGREL BISULFATE 75 MG/1
75 TABLET ORAL DAILY
Qty: 90 TABLET | Refills: 1 | Status: SHIPPED | OUTPATIENT
Start: 2025-02-28

## 2025-02-28 RX ORDER — METOPROLOL SUCCINATE 100 MG/1
100 TABLET, EXTENDED RELEASE ORAL 2 TIMES DAILY
Qty: 180 TABLET | Refills: 0 | Status: SHIPPED | OUTPATIENT
Start: 2025-02-28 | End: 2025-08-27

## 2025-02-28 NOTE — ASSESSMENT & PLAN NOTE
stable    Orders:  •  escitalopram (LEXAPRO) 20 mg tablet; Take 1 tablet (20 mg total) by mouth daily

## 2025-02-28 NOTE — ASSESSMENT & PLAN NOTE
Switch eliquis to plavix.  Orders:  •  clopidogrel (Plavix) 75 mg tablet; Take 1 tablet (75 mg total) by mouth daily

## 2025-02-28 NOTE — ASSESSMENT & PLAN NOTE
Viral common cold type.  Promethazine syrup  Orders:  •  promethazine (PHENERGAN) 12.5 mg/10 mL oral solution; Take 10 mL (12.5 mg total) by mouth every 6 (six) hours as needed (cough)

## 2025-02-28 NOTE — ASSESSMENT & PLAN NOTE
Stable.  Orders:  •  metoprolol succinate (TOPROL-XL) 100 mg 24 hr tablet; Take 1 tablet (100 mg total) by mouth 2 (two) times a day

## 2025-02-28 NOTE — PROGRESS NOTES
Transition of Care Visit  Name: Won Gaitan Jr.      : 1950      MRN: [id number]  Encounter Provider: Pieter Torres DO  Encounter Date: 2025   Encounter department: [facility]    Assessment & Plan  S/P R ALT flap to right foot  Seeing podiatry.  Healing well       Viral URI with cough  Viral common cold type.  Promethazine syrup  Orders:  •  promethazine (PHENERGAN) 12.5 mg/10 mL oral solution; Take 10 mL (12.5 mg total) by mouth every 6 (six) hours as needed (cough)    Type 2 diabetes mellitus with diabetic neuropathy, without long-term current use of insulin (HCC)  Stable refill med.  Lab Results   Component Value Date    HGBA1C 7.7 (H) 2025       Orders:  •  metFORMIN (GLUCOPHAGE-XR) 750 mg 24 hr tablet; Take 1 tablet (750 mg total) by mouth 2 (two) times a day with meals    Cellulitis of left lower leg  Stable.  Orders:  •  metoprolol succinate (TOPROL-XL) 100 mg 24 hr tablet; Take 1 tablet (100 mg total) by mouth 2 (two) times a day    Atrial fibrillation, unspecified type (HCC)  Switch eliquis to plavix.  Orders:  •  clopidogrel (Plavix) 75 mg tablet; Take 1 tablet (75 mg total) by mouth daily    Anxiety and depression  stable    Orders:  •  escitalopram (LEXAPRO) 20 mg tablet; Take 1 tablet (20 mg total) by mouth daily         History of Present Illness     Transitional Care Management Review:   Won Gaitan Jr. is a 74 y.o. male here for TCM follow up.     During the TCM phone call patient stated:  TCM Call     Date and time call was made  2025  8:39 AM    Hospital care reviewed  Records reviewed    Patient was hospitialized at  [facility]    Comment  went to [facility] 21-21    Date of Admission  25    Date of discharge  25    Diagnosis  Wound on R foot    Disposition  Home    Were the patients medications reviewed and updated  Yes    Current Symptoms  None      TCM Call     Post hospital issues  None     Should patient be enrolled in anticoag monitoring?  No    Scheduled for follow up?  Yes    Patients specialists  Other (comment)    Other specialists names  Wound Care, Infectious Disease, Podiatry    Did you obtain your prescribed medications  Yes    Do you need help managing your prescriptions or medications  No    Is transportation to your appointment needed  No    I have advised the patient to call PCP with any new or worsening symptoms  Mirlande beckford    Living Arrangements  Spouse or Significiant other    Support System  Spouse    The type of support provided  Emotional; Physical; Financial    Do you have social support  Yes, as much as I need    Are you recieving any outpatient services  No    Are you recieving home care services  No    Types of home care services  Nurse visit    Are you using any community resources  No    Current waiver services  No    Have you fallen in the last 12 months  No    Interperter language line needed  No    Counseling  Patient    Counseling topics  Activities of daily living; Importance of RX compliance        TCM - Right plantar foot DFU with cellulitis and left dorsal 3rd toe wound.  Patient was admitted under podiatry service for IV antibiotics and imaging.  Internal medicine was consulted for medical management.  Imaging of bilateral feet reviewed with the patient, no concern for osteomyelitis was noted.  Left foot cortical reaction noted to the left fifth metatarsal on x-ray had no clinical concern, no contiguous wound base, no signs of infection. Wound cultures from the wound care clinic visit resulted as Staph aureus with sensitivities to tetracyclines, clindamycin, cefazolin.  Patient was discharged on 2/19/2025 with a short course of oral Keflex with outpatient follow-up for continued wound care.      Review of Systems   Constitutional:  Negative for chills and fever.   HENT:  Negative for ear pain and sore throat.    Eyes:  Negative for pain and visual disturbance.  "  Respiratory:  Negative for cough and shortness of breath.    Cardiovascular:  Negative for chest pain and palpitations.   Gastrointestinal:  Negative for abdominal pain and vomiting.   Genitourinary:  Negative for dysuria and hematuria.   Musculoskeletal:  Positive for arthralgias and back pain.   Skin:  Negative for color change and rash.   Neurological:  Negative for seizures and syncope.   All other systems reviewed and are negative.    Objective   /74 (BP Location: Left arm, Patient Position: Sitting, Cuff Size: Large)   Pulse 62   Temp 97.5 °F (36.4 °C)   Ht 6' 4\" (1.93 m)   Wt 114 kg (251 lb 12.8 oz)   SpO2 98%   BMI 30.65 kg/m²     Physical Exam  Vitals reviewed.   Constitutional:       General: He is not in acute distress.     Appearance: Normal appearance. He is well-developed.   HENT:      Head: Normocephalic and atraumatic.   Eyes:      Conjunctiva/sclera: Conjunctivae normal.   Cardiovascular:      Rate and Rhythm: Normal rate and regular rhythm.      Pulses: Normal pulses.      Heart sounds: Normal heart sounds. No murmur heard.  Pulmonary:      Effort: Pulmonary effort is normal. No respiratory distress.      Breath sounds: Normal breath sounds.   Abdominal:      Palpations: Abdomen is soft.      Tenderness: There is no abdominal tenderness.   Musculoskeletal:         General: No swelling.      Cervical back: Neck supple.   Feet:      Comments: R foot bandaged, midway amp.  Skin:     General: Skin is warm and dry.      Capillary Refill: Capillary refill takes less than 2 seconds.   Neurological:      Mental Status: He is alert.   Psychiatric:         Mood and Affect: Mood normal.       Medications have been reviewed by provider in current encounter    Administrative Statements   I have spent a total time of 45 minutes in caring for this patient on the day of the visit/encounter including Diagnostic results, Prognosis, Risks and benefits of tx options, Instructions for management, Patient " and family education, Importance of tx compliance, Risk factor reductions, Impressions, Counseling / Coordination of care, Documenting in the medical record, Reviewing/placing orders in the medical record (including tests, medications, and/or procedures), and Obtaining or reviewing history  .

## 2025-02-28 NOTE — ASSESSMENT & PLAN NOTE
Stable refill med.  Lab Results   Component Value Date    HGBA1C 7.7 (H) 01/16/2025       Orders:  •  metFORMIN (GLUCOPHAGE-XR) 750 mg 24 hr tablet; Take 1 tablet (750 mg total) by mouth 2 (two) times a day with meals

## 2025-03-03 ENCOUNTER — OFFICE VISIT (OUTPATIENT)
Dept: WOUND CARE | Facility: CLINIC | Age: 75
End: 2025-03-03
Payer: COMMERCIAL

## 2025-03-03 VITALS
RESPIRATION RATE: 18 BRPM | DIASTOLIC BLOOD PRESSURE: 98 MMHG | TEMPERATURE: 97.4 F | HEART RATE: 68 BPM | SYSTOLIC BLOOD PRESSURE: 158 MMHG

## 2025-03-03 DIAGNOSIS — E11.40 TYPE 2 DIABETES MELLITUS WITH DIABETIC NEUROPATHY, UNSPECIFIED WHETHER LONG TERM INSULIN USE (HCC): ICD-10-CM

## 2025-03-03 DIAGNOSIS — L97.412 ULCER OF HEEL AND MIDFOOT, RIGHT, WITH FAT LAYER EXPOSED (HCC): Primary | ICD-10-CM

## 2025-03-03 DIAGNOSIS — L97.521 ULCER OF TOE OF LEFT FOOT, LIMITED TO BREAKDOWN OF SKIN (HCC): ICD-10-CM

## 2025-03-03 DIAGNOSIS — S81.801A TRAUMATIC OPEN WOUND OF RIGHT LOWER LEG, INITIAL ENCOUNTER: ICD-10-CM

## 2025-03-03 PROCEDURE — 11042 DBRDMT SUBQ TIS 1ST 20SQCM/<: CPT | Performed by: PODIATRIST

## 2025-03-03 PROCEDURE — 99214 OFFICE O/P EST MOD 30 MIN: CPT | Performed by: PODIATRIST

## 2025-03-03 PROCEDURE — 99213 OFFICE O/P EST LOW 20 MIN: CPT | Performed by: PODIATRIST

## 2025-03-03 RX ORDER — LIDOCAINE 40 MG/G
CREAM TOPICAL ONCE
Status: COMPLETED | OUTPATIENT
Start: 2025-03-03 | End: 2025-03-03

## 2025-03-03 RX ADMIN — LIDOCAINE 1 APPLICATION: 40 CREAM TOPICAL at 09:24

## 2025-03-03 NOTE — PATIENT INSTRUCTIONS
Orders Placed This Encounter   Procedures    Wound cleansing and dressings Diabetic Ulcer Anterior;Left Toe D3, third     Wound cleansing and dressings             Wash your hands with soap and water.  Remove old dressing, discard into plastic bag and place in trash.  Cleanse the wound with gentle unscented soap and water prior to applying a clean dressing. Do not use tissue or cotton balls. Do not scrub the wound. Pat dry using gauze.     Shower no      Apply acticoat 3 to the left third toe wound.  Cover with gauze  Secure with roll gauze  Change dressing 3x week        Off-loading Instructions:     Keep weight and pressure off wound at all times.   Wear off-loading device as directed by your physician. Put on immediately when rising in the morning and remove when going to bed.  Wear sugical shoe or open toed shoe. Ensure that there is no rubbing to wound.   Limit your walking     Follow up at the wound center in one week.     Standing Status:   Future     Expected Date:   3/3/2025     Expiration Date:   3/10/2025    Wound cleansing and dressings Diabetic Ulcer Right;Plantar Foot     Wash your hands with soap and water.  Remove old dressing, discard into plastic bag and place in trash.  Cleanse the wound with gentle unscnted soap and water prior to applying a clean dressing. Do not use tissue or cotton balls. Do not scrub the wound. Pat dry using gauze.     Shower no      Apply acticoat 3 to the right foot wound.  Cover with ABD  Secure with roll gauze  Change dressing 3x week      Elastic Tubular Stocking Spandagrip F     Tubular elastic bandage: Apply from base of toes to behind the knee. Apply in AM, may remove for sleep.  Avoid prolonged standing in one place.  Elevate leg(s) above the level of the heart when sitting or as much as possible.      Off-loading Instructions:     Keep weight and pressure off wound at all times.  Wear off-loading device as directed by your physician. Put on immediately when rising  in the morning and remove when going to bed.  Limit walking as much as possible!  Use wheelchair.   Apply CAM boot to right foot   Dobule 1/4 inch felt padding to heel        Return to wound center in 1 week     Standing Status:   Future     Expected Date:   3/3/2025     Expiration Date:   3/10/2025    Debridement Diabetic Ulcer Right;Plantar Foot     This order was created via procedure documentation    Wound cleansing and dressings Right Pretibial     Wound location Right lower leg    Change dressing every 3-4 days  The dressing must stay dry and intact. You may shower with dressing protected by cast cover or bag. Or you may sponge bathe.   On dressing change days, cleanse the wound with wound cleanser or mild soap and water, rinse, pat dry.     Apply allevyn (pink silicone bordered foam) to wound     Standing Status:   Future     Expected Date:   3/3/2025     Expiration Date:   3/10/2025    Cam Boot     Size:   Medium     Type:   High Tide

## 2025-03-03 NOTE — PROGRESS NOTES
Patient ID: Won Gaitan Jr. is a 74 y.o. male Date of Birth 1950       Chief Complaint   Patient presents with    Follow Up Wound Care Visit       Allergies:  Simvastatin and Itraconazole    Assessments:      Diagnosis ICD-10-CM Associated Orders   1. Ulcer of heel and midfoot, right, with fat layer exposed ([facility])  L97.412 Wound cleansing and dressings Diabetic Ulcer Right;Plantar Foot     lidocaine (LMX) 4 % cream     Debridement Diabetic Ulcer Right;Plantar Foot     Cam Boot     Wound Procedure Treatment Diabetic Ulcer Right;Plantar Foot      2. Ulcer of toe of left foot, limited to breakdown of skin ([facility])  L97.521 Wound cleansing and dressings Diabetic Ulcer Anterior;Left Toe D3, third     lidocaine (LMX) 4 % cream     Wound Procedure Treatment Diabetic Ulcer Anterior;Left Toe D3, third      3. Traumatic open wound of right lower leg, initial encounter  S81.801A lidocaine (LMX) 4 % cream     Wound cleansing and dressings Right Pretibial     Wound Procedure Treatment Right Pretibial      4. Type 2 diabetes mellitus with diabetic neuropathy, unspecified whether long term insulin use ([facility])  E11.40 Debridement Diabetic Ulcer Right;Plantar Foot           Plan:  Reviewed medical records.  Patient was counseled and educated on the condition and the diagnosis.    2.  The diagnosis, treatment options and prognosis were discussed with the patient.    3.  Wounds look stable and smaller.  Stressed on pt compliance about proper off-loading and staying off of his feet.  Discussed risks related to his condition and non-compliance including more amputation / infection.    4. Possible TCC depending on the progress.  Sent him for new CAM boot.    5. Surgical shoe on left foot.  Spandigrip for compression of legs.  Instructed proper elevation of legs.    6. RA in 1 week.         Imaging: I have personally reviewed pertinent films in PACS  Labs, pathology, and Other Studies: I have personally reviewed pertinent reports.   "      Debridement   Wound 12/16/24 Diabetic Ulcer Foot Right;Plantar    Universal Protocol:  Consent: Verbal consent obtained.  Risks and benefits: risks, benefits and alternatives were discussed  Consent given by: patient  Time out: Immediately prior to procedure a \"time out\" was called to verify the correct patient, procedure, equipment, support staff and site/side marked as required.  Timeout called at: 3/3/2025 9:35 AM.  Patient understanding: patient states understanding of the procedure being performed  Patient identity confirmed: verbally with patient    Debridement Details  Performed by: physician  Debridement type: surgical  Level of debridement: subcutaneous tissue  Pain control: lidocaine 4%      Post-debridement measurements  Length (cm): 1  Width (cm): 1.4  Depth (cm): 0.2  Percent debrided: 100%  Surface Area (cm^2): 1.4  Area Debrided (cm^2): 1.4  Volume (cm^3): 0.28    Tissue and other material debrided: dermis, epidermis and subcutaneous tissue  Devitalized tissue debrided: callus, fibrin and slough  Instrument(s) utilized: blade  Technique utilized: excisionalBleeding: small  Hemostasis obtained with: pressure  Procedural pain (0-10): 0  Post-procedural pain: 0   Response to treatment: procedure was tolerated well           Subjective:     HPI  The patient presents for evaluation and treatment of wounds in both feet.  He presents with cast boot on right and surgical shoe on left foot.  He has new wound on right leg from the cast boot.  He was wearing his wife's old CAM boot since he had new wound on right leg.  No pain.        The following portions of the patient's history were reviewed and updated as appropriate:   Patient Active Problem List   Diagnosis    Hyperlipidemia    Type 2 diabetes mellitus with diabetic neuropathy (HCC)    Atrial fibrillation (HCC)    Right middle lobe pulmonary nodule    Type 2 diabetes mellitus with other skin ulcer (CODE) (HCC)    Essential hypertension    Benign " "prostatic hyperplasia with lower urinary tract symptoms    Microalbuminuria    Ulcer of right foot with fat layer exposed (HCC)    Ambulatory dysfunction    S/P transmetatarsal amputation of foot, right (HCC)    Sleep disturbance    Anxiety and depression    S/P R ALT flap to right foot    Gait abnormality    Vasomotor rhinitis    Corns and callosities    Tinea unguium    Cellulitis of left lower leg    Puncture wound of left foot with foreign body, subsequent encounter    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 (HCC)    Personal history of COVID-19    Bronchitis    S/P amputation of lesser toe, left (HCC)    Ulcer of toe of left foot, limited to breakdown of skin (HCC)    Medication management    Subconjunctival hemorrhage of right eye    Viral URI with cough    Ulcer of heel and midfoot, right, with fat layer exposed (HCC)     Past Medical History:   Diagnosis Date    Arthritis     Atrial fibrillation (HCC)     Diagnosed 11/2018    Benign prostate hyperplasia 04/2002    Cellulitis     right lower leg     Colon polyp 2006    Diabetes mellitus (HCC)     Hearing aid worn     bilat    Hearing loss     90% loss left ear and 40% right ear    History of clubfoot     \"since birth\"    History of pneumonia     History of TIA (transient ischemic attack) 04/25/2017 11/30/18 pt denies    Hyperlipidemia 5/15/2014    Hypertension     Infectious viral hepatitis     \"cant remember type\"    Irregular heart beat     Afib    Neuropathy     both feet    Osteomyelitis (HCC)     right great toe    Pneumonia     Right middle lobe pulmonary nodule 11/6/2018    Seasickness     Teeth missing     Type 2 diabetes mellitus with diabetic neuropathy (HCC) 11/6/2018    Wears glasses     reading    Wound, open     bottom of right foot     Past Surgical History:   Procedure Laterality Date    BUNIONECTOMY Right 12/4/2018    Procedure: 5TH METATARSAL BONE PARTIAL RESECTION, FULL " THICKNESS DEBRIDEMENT OF DIABETIC ULCER;  Surgeon: Shala Flynn DPM;  [facility];  Service: Podiatry    CLOSURE DELAYED PRIMARY Left 10/30/2023    Procedure: CLOSURE DELAYED PRIMARY left foot;  Surgeon: Minh Urena DPM;  [facility];  Service: Podiatry    CLUB FOOT RELEASE Bilateral     COLONOSCOPY      COMPLEX WOUND CLOSURE TO EXTREMITY  4/27/2021    Procedure: COMPLEX WOUND CLOSURE TO EXTREMITY: RIGHT FOOT;  Surgeon: Niraj Bojorquez MD;  [facility];  Service: Plastics    EXTERNAL FIXATOR APPLICATION Right 2/12/2021    Procedure: Application multiplane external fixation;  Surgeon: Harry Zamudio DPM;  [facility];  Service: Podiatry    FOOT HARDWARE REMOVAL Right 2/17/2021    Procedure: REMOVAL EXTERNAL FIXATOR;  Surgeon: Harry Zamudio DPM;  [facility];  Service: Podiatry    FOREIGN BODY REMOVAL Right 4/27/2021    Procedure: REMOVAL FOREIGN BODY EXTREMITY: RIGHT FOOT;  Surgeon: Niraj Bojorquez MD;  [facility];  Service: Plastics    INCISION AND DRAINAGE OF WOUND Right 2/17/2021    Procedure: INCISION AND DRAINAGE (I&D) EXTREMITY;  Surgeon: Harry Zamudio DPM;  [facility];  Service: Podiatry    INCISION AND DRAINAGE OF WOUND Left 10/26/2023    Procedure: INCISION AND DRAINAGE (I&D) EXTREMITY; WASHOUT; REMOVAL OF FOREIGN BODY;  Surgeon: Satnam Ponce DPM;  [facility];  Service: Podiatry    ORIF FOOT FRACTURE Right 3/4/2021    Procedure: VAC PLACEMENT; SCREW FIXATION RIGHT FOOT FUSION;  Surgeon: Harry Zamudio DPM;  [facility];  Service: Podiatry    CA AMPUTATION FOOT TRANSMETARSAL Right 2/22/2021    Procedure: AMPUTATION TRANSMETATARSAL (TMA), REMOVAL NAIL IM T2 ICF HARDWARE;  Surgeon: Harry Zamudio DPM;  [facility];  Service: Podiatry    CA AMPUTATION METATARSAL W/TOE SINGLE Left 11/29/2023    Procedure: LEFT FOURTH RAY RESECTION FOOT;  Surgeon: Harry Zamudio DPM;  [facility];   Service: Podiatry    NY ARTHRD MIDTARSL/TARS MLT/TRANSVRS W/OSTEOT Right 2/12/2021    Procedure: foot ARTHRODESIS/FUSION;  Surgeon: Harry Zamudio DPM;  [facility];  Service: Podiatry    NY DEBRIDEMENT BONE 1ST 20 SQ CM/< Right 12/2/2021    Procedure: DEBRIDEMENT OF TARSAL BONES WITH ANITBIODIC BEADS;  Surgeon: Harry Zamudio DPM;  [facility];  Service: Podiatry    NY LNGTH/SHRT TENDON LEG/ANKLE 1 TENDON SPX Right 2/12/2021    Procedure: LENGTHEN TIBIAL TENDON, TRANS HEEL CORD;  Surgeon: Harry Zamudio DPM;  [facility];  Service: Podiatry    NY MUSC MYOCUTANEOUS/FASCIOCUTANEOUS FLAP TRUNK Right 4/12/2021    Procedure: RECONSTRUCTION MICROSURGICAL W/ FREE FLAP;  Surgeon: Niraj Bojorquez MD;  [facility];  Service: Plastics    NY MUSC MYOCUTANEOUS/FASCIOCUTANEOUS FLAP TRUNK Right 4/12/2021    Procedure: TAKEBACK RECONSTRUCTION MICROSURGICAL W/ FREE FLAP;  Surgeon: Niraj Bojorquez MD;  [facility];  Service: Plastics    NY MUSC MYOCUTANEOUS/FASCIOCUTANEOUS FLAP TRUNK Right 4/13/2021    Procedure: RECONSTRUCTION MICROSURGICAL W/ FREE FLAP, RE EXPLORATION, MICRO VASCULAR REVISION;  Surgeon: Niraj Bojorquez MD;  [facility];  Service: Plastics    NY OSTC PRTL EXOSTC/CONDYLC METAR HEAD Right 12/29/2020    Procedure: EXCISION EXOSTOSIS;  Surgeon: Harry Zamudio DPM;  [facility];  Service: Podiatry    NY SECONDARY CLOSURE SURG WOUND/DEHSN XTNSV/COMP Right 12/29/2020    Procedure: PRIMARY DELAYED CLOSURE;  Surgeon: Harry Zamudio DPM;  [facility];  Service: Podiatry    TOE AMPUTATION Right     partial great toe    TONSILLECTOMY      VAC DRESSING APPLICATION Right 3/1/2021    Procedure: APPLICATION VAC DRESSING EXTREMITY;  Surgeon: Niraj Bojorquez MD;  [facility];  Service: Plastics    VASECTOMY  1992    WOUND DEBRIDEMENT Right 3/1/2021    Procedure: DEBRIDEMENT LOWER EXTREMITY (WASH OUT);   Surgeon: Niraj Bojorquez MD;  [facility];  Service: Plastics    WOUND DEBRIDEMENT Right 2021    Procedure: DEBRIDEMENT RIGHT FOOT;  Surgeon: Niraj Bojorquez MD;  [facility];  Service: Plastics    WOUND DEBRIDEMENT Right 2021    Procedure: DEBRIDEMENT LOWER EXTREMITY (WASH OUT);  Surgeon: Niraj Bojorquez MD;  [facility];  Service: Plastics    WOUND DEBRIDEMENT Right 2021    Procedure: DEBRIDEMENT LOWER EXTREMITY (WASH OUT): RIGHT FOOT;  Surgeon: Niraj Bojorquez MD;  [facility];  Service: Plastics     Family History   Problem Relation Age of Onset    Diabetes Father         mellitus      Social History     Socioeconomic History    Marital status: /Civil Union     Spouse name: None    Number of children: None    Years of education: None    Highest education level: None   Occupational History    None   Tobacco Use    Smoking status: Former     Current packs/day: 0.00     Types: Cigarettes     Quit date: 1988     Years since quittin.3     Passive exposure: Past    Smokeless tobacco: Former    Tobacco comments:     exposure to passive smoke   Vaping Use    Vaping status: Never Used   Substance and Sexual Activity    Alcohol use: Yes     Comment: occasionally    Drug use: Not Currently    Sexual activity: Yes     Partners: Female   Other Topics Concern    None   Social History Narrative    None     Social Drivers of Health     Financial Resource Strain: Not on file   Food Insecurity: No Food Insecurity (2025)    Nursing - Inadequate Food Risk Classification     Worried About Running Out of Food in the Last Year: Never true     Ran Out of Food in the Last Year: Never true     Ran Out of Food in the Last Year: Never true   Transportation Needs: No Transportation Needs (2025)    Nursing - Transportation Risk Classification     Lack of Transportation: Not on file     Lack of Transportation: No   Physical  Activity: Not on file   Stress: Not on file   Social Connections: Not on file   Intimate Partner Violence: Unknown (2025)    Nursing IPS     Feels Physically and Emotionally Safe: Not on file     Physically Hurt by Someone: Not on file     Humiliated or Emotionally Abused by Someone: Not on file     Physically Hurt by Someone: No     Hurt or Threatened by Someone: No   Housing Stability: Unknown (2025)    Nursing: Inadequate Housing Risk Classification     Has Housing: Not on file     Worried About Losing Housing: Not on file     Unable to Get Utilities: Not on file     Unable to Pay for Housing in the Last Year: No     Has Housin        Current Outpatient Medications:     acetaminophen (Mapap Arthritis Pain) 650 mg CR tablet, Take 1 tablet (650 mg total) by mouth 2 (two) times a day (Patient not taking: Reported on 2025), Disp: 60 tablet, Rfl: 1    atorvastatin (LIPITOR) 40 mg tablet, Take 1 tablet (40 mg total) by mouth daily, Disp: 90 tablet, Rfl: 1    clopidogrel (Plavix) 75 mg tablet, Take 1 tablet (75 mg total) by mouth daily, Disp: 90 tablet, Rfl: 1    Continuous Glucose Sensor (FreeStyle Taylor 3 Sensor) MISC, Use 1 each every 14 (fourteen) days, Disp: 1 each, Rfl: 0    diltiazem (CARDIZEM) 120 MG tablet, Take 1 tablet (120 mg total) by mouth every 12 (twelve) hours, Disp: 180 tablet, Rfl: 1    Eliquis 5 MG, Take 1 tablet (5 mg total) by mouth 2 (two) times a day (Patient not taking: Reported on 2025), Disp: 60 tablet, Rfl: 11    escitalopram (LEXAPRO) 20 mg tablet, Take 1 tablet (20 mg total) by mouth daily, Disp: 90 tablet, Rfl: 0    glucose blood (Accu-Chek Guide) test strip, USE DAILY (Patient not taking: Reported on 2025), Disp: 100 strip, Rfl: 2    Insulin Glargine Solostar (Lantus SoloStar) 100 UNIT/ML SOPN, Inject 0.3 mL (30 Units total) under the skin daily at bedtime, Disp: 45 mL, Rfl: 1    insulin lispro (HumaLOG) 100 units/mL injection pen, Inject up to 20 Units under  the skin 3 (three) times a day with meals, Disp: 15 mL, Rfl: 5    Insulin Pen Needle (Easy Comfort Pen Needles) 33G X 5 MM MISC, USE AS DIRECTED, Disp: 100 each, Rfl: 4    Lancets (onetouch ultrasoft) lancets, Use as instructed (Patient not taking: Reported on 2/17/2025), Disp: 100 each, Rfl: 3    Lancets (onetouch ultrasoft) lancets, Use daily (Patient not taking: Reported on 2/17/2025), Disp: 100 each, Rfl: 3    lisinopril (ZESTRIL) 40 mg tablet, Take 1 tablet (40 mg total) by mouth daily, Disp: 90 tablet, Rfl: 1    metFORMIN (GLUCOPHAGE-XR) 750 mg 24 hr tablet, Take 1 tablet (750 mg total) by mouth 2 (two) times a day with meals, Disp: 180 tablet, Rfl: 1    metoprolol succinate (TOPROL-XL) 100 mg 24 hr tablet, Take 1 tablet (100 mg total) by mouth 2 (two) times a day, Disp: 180 tablet, Rfl: 0    mupirocin (BACTROBAN) 2 % ointment, Apply topically 3 (three) times a day (Patient not taking: Reported on 2/28/2025), Disp: 15 g, Rfl: 0    promethazine (PHENERGAN) 12.5 mg/10 mL oral solution, Take 10 mL (12.5 mg total) by mouth every 6 (six) hours as needed (cough), Disp: 118 mL, Rfl: 0    tamsulosin (FLOMAX) 0.4 mg, Take 1 capsule (0.4 mg total) by mouth daily with dinner, Disp: 90 capsule, Rfl: 1  No current facility-administered medications for this visit.    Review of Systems   Constitutional:  Negative for chills and fever.   Respiratory:  Negative for shortness of breath.    Cardiovascular:  Negative for chest pain.   Gastrointestinal:  Negative for nausea and vomiting.   Skin:  Positive for wound.   Neurological:  Positive for numbness. Negative for weakness.       Objective:  /98   Pulse 68   Temp (!) 97.4 °F (36.3 °C)   Resp 18   Pain Score: 0-No pain     Physical Exam  Vitals and nursing note reviewed.   Constitutional:       General: He is not in acute distress.     Appearance: He is not toxic-appearing or diaphoretic.   Cardiovascular:      Rate and Rhythm: Normal rate and regular rhythm.       Pulses:           Dorsalis pedis pulses are 1+ on the right side and 1+ on the left side.        Posterior tibial pulses are 0 on the right side and 0 on the left side.   Pulmonary:      Effort: Pulmonary effort is normal. No respiratory distress.   Musculoskeletal:         General: Deformity present. No signs of injury.      Right lower leg: Edema present.      Left lower leg: Edema present.      Right foot: No foot drop.      Left foot: No foot drop.      Comments: S/P partial foot amputation right.   Skin:     General: Skin is warm.      Capillary Refill: Capillary refill takes less than 2 seconds.      Coloration: Skin is not cyanotic or mottled.      Findings: Wound present. No abscess.      Nails: There is no clubbing.      Comments: Parks 1 ulcer noted left 3rd toe.  It is smaller.  Wound bed is granular.  No significant redness or swelling.  No exposed bone.  Parks 1 ulcer right plantar foot.  Decrease in size.  No deep probing. Cellulitis resolved.  See wound assessment and photo.   Neurological:      General: No focal deficit present.      Mental Status: He is alert and oriented to person, place, and time.      Cranial Nerves: No cranial nerve deficit.      Sensory: Sensory deficit present.      Motor: No weakness.      Coordination: Coordination normal.   Psychiatric:         Mood and Affect: Mood normal.         Behavior: Behavior normal.         Thought Content: Thought content normal.         Judgment: Judgment normal.                 Wound 12/16/24 Diabetic Ulcer Foot Right;Plantar (Active)   Wound Image Images linked 03/03/25 0939   Wound Description Granulation tissue;Pink 03/03/25 0919   Lavern-wound Assessment Intact;Dry;Callus 03/03/25 0919   Wound Length (cm) 0.9 cm 03/03/25 0919   Wound Width (cm) 1.3 cm 03/03/25 0919   Wound Depth (cm) 0.2 cm 03/03/25 0919   Wound Surface Area (cm^2) 1.17 cm^2 03/03/25 0919   Wound Volume (cm^3) 0.234 cm^3 03/03/25 0919   Calculated Wound Volume (cm^3) 0.23  cm^3 03/03/25 0919   Change in Wound Size % -4.55 03/03/25 0919   Drainage Amount Small 03/03/25 0919   Drainage Description Serosanguineous 03/03/25 0919   Non-staged Wound Description Full thickness 03/03/25 0919       Wound 12/30/24 Diabetic Ulcer Toe D3, third Anterior;Left (Active)   Wound Image Images linked 03/03/25 0918   Wound Description Pink;Pale;Yellow;Slough 03/03/25 0921   Lavern-wound Assessment Intact;White 03/03/25 0921   Wound Length (cm) 0.4 cm 03/03/25 0921   Wound Width (cm) 0.6 cm 03/03/25 0921   Wound Depth (cm) 0.1 cm 03/03/25 0921   Wound Surface Area (cm^2) 0.24 cm^2 03/03/25 0921   Wound Volume (cm^3) 0.024 cm^3 03/03/25 0921   Calculated Wound Volume (cm^3) 0.02 cm^3 03/03/25 0921   Change in Wound Size % 66.67 03/03/25 0921   Drainage Amount Small 03/03/25 0921   Drainage Description Serosanguineous 03/03/25 0921   Non-staged Wound Description Full thickness 03/03/25 0921       Wound 03/03/25 Pretibial Right (Active)   Wound Image Images linked 03/03/25 0917   Wound Description Brown;Intact;Eschar 03/03/25 0918   Lavren-wound Assessment Intact;Swelling 03/03/25 0918   Wound Length (cm) 2.9 cm 03/03/25 0918   Wound Width (cm) 1.9 cm 03/03/25 0918   Wound Depth (cm) 0.1 cm 03/03/25 0918   Wound Surface Area (cm^2) 5.51 cm^2 03/03/25 0918   Wound Volume (cm^3) 0.551 cm^3 03/03/25 0918   Calculated Wound Volume (cm^3) 0.55 cm^3 03/03/25 0918   Drainage Amount Scant 03/03/25 0918   Drainage Description Serosanguineous 03/03/25 0918   Non-staged Wound Description Full thickness 03/03/25 0918         Results from last 6 Months   Lab Units 02/17/25  1118   WOUND CULTURE  2+ Growth of Staphylococcus aureus*         Lab Results   Component Value Date    HGBA1C 7.7 (H) 01/16/2025       Wound Instructions:  Orders Placed This Encounter   Procedures    Wound cleansing and dressings Diabetic Ulcer Anterior;Left Toe D3, third     Wound cleansing and dressings             Wash your hands with soap and  water.  Remove old dressing, discard into plastic bag and place in trash.  Cleanse the wound with gentle unscented soap and water prior to applying a clean dressing. Do not use tissue or cotton balls. Do not scrub the wound. Pat dry using gauze.     Shower no      Apply acticoat 3 to the left third toe wound.  Cover with gauze  Secure with roll gauze  Change dressing 3x week        Off-loading Instructions:     Keep weight and pressure off wound at all times.   Wear off-loading device as directed by your physician. Put on immediately when rising in the morning and remove when going to bed.  Wear sugical shoe or open toed shoe. Ensure that there is no rubbing to wound.   Limit your walking     Follow up at the wound center in one week.     Standing Status:   Future     Expected Date:   3/3/2025     Expiration Date:   3/10/2025    Wound cleansing and dressings Diabetic Ulcer Right;Plantar Foot     Wash your hands with soap and water.  Remove old dressing, discard into plastic bag and place in trash.  Cleanse the wound with gentle unscnted soap and water prior to applying a clean dressing. Do not use tissue or cotton balls. Do not scrub the wound. Pat dry using gauze.     Shower no      Apply acticoat 3 to the right foot wound.  Cover with ABD  Secure with roll gauze  Change dressing 3x week      Elastic Tubular Stocking Spandagrip F     Tubular elastic bandage: Apply from base of toes to behind the knee. Apply in AM, may remove for sleep.  Avoid prolonged standing in one place.  Elevate leg(s) above the level of the heart when sitting or as much as possible.      Off-loading Instructions:     Keep weight and pressure off wound at all times.  Wear off-loading device as directed by your physician. Put on immediately when rising in the morning and remove when going to bed.  Limit walking as much as possible!  Use wheelchair.   Apply CAM boot to right foot   Dobule 1/4 inch felt padding to heel        Return to wound  center in 1 week     Standing Status:   Future     Expected Date:   3/3/2025     Expiration Date:   3/10/2025    Debridement Diabetic Ulcer Right;Plantar Foot     This order was created via procedure documentation    Wound cleansing and dressings Right Pretibial     Wound location Right lower leg    Change dressing every 3-4 days  The dressing must stay dry and intact. You may shower with dressing protected by cast cover or bag. Or you may sponge bathe.   On dressing change days, cleanse the wound with wound cleanser or mild soap and water, rinse, pat dry.     Apply allevyn (pink silicone bordered foam) to wound     Standing Status:   Future     Expected Date:   3/3/2025     Expiration Date:   3/10/2025    Wound Procedure Treatment Diabetic Ulcer Right;Plantar Foot     This order was created via procedure documentation    Wound Procedure Treatment Diabetic Ulcer Anterior;Left Toe D3, third     This order was created via procedure documentation    Wound Procedure Treatment Right Pretibial     This order was created via procedure documentation    Cam Boot     Size:   Medium     Type:   High Tide             Satnam Ponec DPM

## 2025-03-03 NOTE — PROGRESS NOTES
Wound Procedure Treatment Diabetic Ulcer Right;Plantar Foot    Performed by: Lanette Faulkner RN  Authorized by: Satnam Ponce DPM  Associated wounds:   Wound 12/16/24 Diabetic Ulcer Foot Right;Plantar    Wound cleansed with:  NSS   Applied primary dressing:  Acticoat   Applied secondary dressing:  ABD   Dressing secured with:  Kacy, Tape, Elastic tubular stocking and Size F   Offloading device appllied:  CAM   Comments:  Acticoat 3   Wound Procedure Treatment Diabetic Ulcer Anterior;Left Toe D3, third    Performed by: Lanette Faulkner RN  Authorized by: Satnam Ponce DPM  Associated wounds:   Wound 12/30/24 Diabetic Ulcer Toe D3, third Anterior;Left    Wound cleansed with:  NSS   Applied primary dressing:  Acticoat   Applied secondary dressing:  Gauze   Dressing secured with:  Kacy and Tape   Offloading device appllied:  Surgical shoe   Comments:  Acticoat 3   Wound Procedure Treatment Right Pretibial    Performed by: Lanette Faulkner RN  Authorized by: Satnam Ponce DPM  Associated wounds:   Wound 03/03/25 Pretibial Right    Wound cleansed with:  NSS   Applied primary dressing:  Silicone bordered foam   Dressing secured with:  Elastic tubular stocking and Size F

## 2025-03-10 ENCOUNTER — OFFICE VISIT (OUTPATIENT)
Dept: WOUND CARE | Facility: CLINIC | Age: 75
End: 2025-03-10
Payer: COMMERCIAL

## 2025-03-10 VITALS
TEMPERATURE: 98.4 F | HEART RATE: 68 BPM | SYSTOLIC BLOOD PRESSURE: 166 MMHG | RESPIRATION RATE: 16 BRPM | DIASTOLIC BLOOD PRESSURE: 94 MMHG

## 2025-03-10 DIAGNOSIS — L97.412 ULCER OF HEEL AND MIDFOOT, RIGHT, WITH FAT LAYER EXPOSED (HCC): Primary | ICD-10-CM

## 2025-03-10 DIAGNOSIS — L97.521 ULCER OF TOE OF LEFT FOOT, LIMITED TO BREAKDOWN OF SKIN (HCC): ICD-10-CM

## 2025-03-10 DIAGNOSIS — E11.40 TYPE 2 DIABETES MELLITUS WITH DIABETIC NEUROPATHY, UNSPECIFIED WHETHER LONG TERM INSULIN USE (HCC): ICD-10-CM

## 2025-03-10 PROCEDURE — 11042 DBRDMT SUBQ TIS 1ST 20SQCM/<: CPT | Performed by: PODIATRIST

## 2025-03-10 PROCEDURE — 99213 OFFICE O/P EST LOW 20 MIN: CPT | Performed by: PODIATRIST

## 2025-03-10 RX ORDER — LIDOCAINE 40 MG/G
CREAM TOPICAL ONCE
Status: COMPLETED | OUTPATIENT
Start: 2025-03-10 | End: 2025-03-10

## 2025-03-10 RX ADMIN — LIDOCAINE: 40 CREAM TOPICAL at 11:20

## 2025-03-10 NOTE — PROGRESS NOTES
Wound Procedure Treatment Right;Anterior Leg    Performed by: Tanner Nielson RN  Authorized by: Satnam Ponce DPM  Associated wounds:   Wound 03/03/25 Leg Right;Anterior    Applied primary dressing:  Other   Comments:  Cosmopor

## 2025-03-10 NOTE — PROGRESS NOTES
Wound Procedure Treatment Diabetic Ulcer Right;Plantar Foot    Performed by: Tanner Nielson RN  Authorized by: Satnam Ponce DPM  Associated wounds:   Wound 12/16/24 Diabetic Ulcer Foot Right;Plantar    Wound cleansed with:  NSS   Applied primary dressing:  Acticoat   Applied secondary dressing:  Gauze and ABD   Dressing secured with:  Kacy, Tape and Size F   Offloading device appllied:  CAM

## 2025-03-10 NOTE — PROGRESS NOTES
Patient ID: Won Gaitan Jr. is a 74 y.o. male Date of Birth 1950       Chief Complaint   Patient presents with    Follow Up Wound Care Visit     BLE wounds       Allergies:  Simvastatin and Itraconazole    Assessments:      Diagnosis ICD-10-CM Associated Orders   1. Ulcer of heel and midfoot, right, with fat layer exposed ([facility])  L97.412 Debridement Diabetic Ulcer Right;Plantar Foot     Wound cleansing and dressings Diabetic Ulcer Right;Plantar Foot     lidocaine (LMX) 4 % cream     Wound Procedure Treatment Diabetic Ulcer Right;Plantar Foot      2. Ulcer of toe of left foot, limited to breakdown of skin (Former[facility])  L97.521 Wound cleansing and dressings Diabetic Ulcer Anterior;Left Toe D3, third     Wound Procedure Treatment Diabetic Ulcer Anterior;Left Toe D3, third      3. Type 2 diabetes mellitus with diabetic neuropathy, unspecified whether long term insulin use (Former[facility])  E11.40 Debridement Diabetic Ulcer Right;Plantar Foot     Wound cleansing and dressings Right Pretibial     Wound Procedure Treatment Right;Anterior Leg           Plan:  Reviewed medical records.  Patient was counseled and educated on the condition and the diagnosis.    2.  The diagnosis, treatment options and prognosis were discussed with the patient.    3.  Right foot wound looks stable and smaller.  Continue local care.  Protective dressing to left 3rd toe and right leg.  Stressed on pt compliance about proper off-loading and staying off of his feet.  Discussed risks related to his condition and non-compliance including more amputation / infection.    4. Possible TCC depending on the progress.  Rx for CAM boot provided again.    5. Surgical shoe on left foot.  Spandigrip for compression of legs.  Instructed proper elevation of legs.    6. RA in 1 week.         Imaging: I have personally reviewed pertinent films in PACS  Labs, pathology, and Other Studies: I have personally reviewed pertinent reports.        Debridement   Wound 12/16/24  "Diabetic Ulcer Foot Right;Plantar    Universal Protocol:  Consent: Verbal consent obtained.  Risks and benefits: risks, benefits and alternatives were discussed  Consent given by: patient  Time out: Immediately prior to procedure a \"time out\" was called to verify the correct patient, procedure, equipment, support staff and site/side marked as required.  Timeout called at: 3/10/2025 11:19 AM.  Patient understanding: patient states understanding of the procedure being performed  Patient identity confirmed: verbally with patient    Debridement Details  Performed by: physician  Debridement type: surgical  Level of debridement: subcutaneous tissue  Pain control: lidocaine 4%      Post-debridement measurements  Length (cm): 0.7  Width (cm): 1  Depth (cm): 0.3  Percent debrided: 100%  Surface Area (cm^2): 0.7  Area Debrided (cm^2): 0.7  Volume (cm^3): 0.21    Tissue and other material debrided: dermis, epidermis and subcutaneous tissue  Devitalized tissue debrided: callus, fibrin and slough  Instrument(s) utilized: blade  Technique utilized: excisionalBleeding: small  Hemostasis obtained with: pressure  Procedural pain (0-10): 0  Post-procedural pain: 0   Response to treatment: procedure was tolerated well           Subjective:     HPI  The patient presents for evaluation and treatment of wounds in both feet.  He presents with cast boot on right and surgical shoe on left foot.  He did not get the new CAM boot.  No pain.  He is positive for COVID last Thursday.        The following portions of the patient's history were reviewed and updated as appropriate:   Patient Active Problem List   Diagnosis    Hyperlipidemia    Type 2 diabetes mellitus with diabetic neuropathy (HCC)    Atrial fibrillation (HCC)    Right middle lobe pulmonary nodule    Type 2 diabetes mellitus with other skin ulcer (CODE) (HCC)    Essential hypertension    Benign prostatic hyperplasia with lower urinary tract symptoms    Microalbuminuria    Ulcer of " "right foot with fat layer exposed (HCC)    Ambulatory dysfunction    S/P transmetatarsal amputation of foot, right (HCC)    Sleep disturbance    Anxiety and depression    S/P R ALT flap to right foot    Gait abnormality    Vasomotor rhinitis    Corns and callosities    Tinea unguium    Cellulitis of left lower leg    Puncture wound of left foot with foreign body, subsequent encounter    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 (HCC)    Personal history of COVID-19    Bronchitis    S/P amputation of lesser toe, left (HCC)    Ulcer of toe of left foot, limited to breakdown of skin (HCC)    Medication management    Subconjunctival hemorrhage of right eye    Viral URI with cough    Ulcer of heel and midfoot, right, with fat layer exposed (HCC)     Past Medical History:   Diagnosis Date    Arthritis     Atrial fibrillation (HCC)     Diagnosed 11/2018    Benign prostate hyperplasia 04/2002    Cellulitis     right lower leg     Colon polyp 2006    Diabetes mellitus (HCC)     Hearing aid worn     bilat    Hearing loss     90% loss left ear and 40% right ear    History of clubfoot     \"since birth\"    History of pneumonia     History of TIA (transient ischemic attack) 04/25/2017 11/30/18 pt denies    Hyperlipidemia 5/15/2014    Hypertension     Infectious viral hepatitis     \"cant remember type\"    Irregular heart beat     Afib    Neuropathy     both feet    Osteomyelitis (HCC)     right great toe    Pneumonia     Right middle lobe pulmonary nodule 11/6/2018    Seasickness     Teeth missing     Type 2 diabetes mellitus with diabetic neuropathy (HCC) 11/6/2018    Wears glasses     reading    Wound, open     bottom of right foot     Past Surgical History:   Procedure Laterality Date    BUNIONECTOMY Right 12/4/2018    Procedure: 5TH METATARSAL BONE PARTIAL RESECTION, FULL THICKNESS DEBRIDEMENT OF DIABETIC ULCER;  Surgeon: Shala Flynn DPM;  [facility];  " Service: Podiatry    CLOSURE DELAYED PRIMARY Left 10/30/2023    Procedure: CLOSURE DELAYED PRIMARY left foot;  Surgeon: Minh Urena DPM;  [facility];  Service: Podiatry    CLUB FOOT RELEASE Bilateral     COLONOSCOPY      COMPLEX WOUND CLOSURE TO EXTREMITY  4/27/2021    Procedure: COMPLEX WOUND CLOSURE TO EXTREMITY: RIGHT FOOT;  Surgeon: Niraj Bojorquez MD;  [facility];  Service: Plastics    EXTERNAL FIXATOR APPLICATION Right 2/12/2021    Procedure: Application multiplane external fixation;  Surgeon: Harry Zamudio DPM;  [facility];  Service: Podiatry    FOOT HARDWARE REMOVAL Right 2/17/2021    Procedure: REMOVAL EXTERNAL FIXATOR;  Surgeon: Harry Zamudio DPM;  [facility];  Service: Podiatry    FOREIGN BODY REMOVAL Right 4/27/2021    Procedure: REMOVAL FOREIGN BODY EXTREMITY: RIGHT FOOT;  Surgeon: Niraj Bojorquez MD;  [facility];  Service: Plastics    INCISION AND DRAINAGE OF WOUND Right 2/17/2021    Procedure: INCISION AND DRAINAGE (I&D) EXTREMITY;  Surgeon: Harry Zamudio DPM;  [facility];  Service: Podiatry    INCISION AND DRAINAGE OF WOUND Left 10/26/2023    Procedure: INCISION AND DRAINAGE (I&D) EXTREMITY; WASHOUT; REMOVAL OF FOREIGN BODY;  Surgeon: Satnam Ponce DPM;  [facility];  Service: Podiatry    ORIF FOOT FRACTURE Right 3/4/2021    Procedure: VAC PLACEMENT; SCREW FIXATION RIGHT FOOT FUSION;  Surgeon: Harry Zamudio DPM;  [facility];  Service: Podiatry    OR AMPUTATION FOOT TRANSMETARSAL Right 2/22/2021    Procedure: AMPUTATION TRANSMETATARSAL (TMA), REMOVAL NAIL IM T2 ICF HARDWARE;  Surgeon: Harry Zamudio DPM;  [facility];  Service: Podiatry    OR AMPUTATION METATARSAL W/TOE SINGLE Left 11/29/2023    Procedure: LEFT FOURTH RAY RESECTION FOOT;  Surgeon: Harry Zamudio DPM;  [facility];  Service: Podiatry    OR ARTHRD MIDTARSL/TARS MLT/TRANSVRS W/OSTEOT Right 2/12/2021     Procedure: foot ARTHRODESIS/FUSION;  Surgeon: Harry Zamudio DPM;  [facility];  Service: Podiatry    AL DEBRIDEMENT BONE 1ST 20 SQ CM/< Right 12/2/2021    Procedure: DEBRIDEMENT OF TARSAL BONES WITH ANITBIODIC BEADS;  Surgeon: Harry Zamudio DPM;  [facility];  Service: Podiatry    AL LNGTH/SHRT TENDON LEG/ANKLE 1 TENDON SPX Right 2/12/2021    Procedure: LENGTHEN TIBIAL TENDON, TRANS HEEL CORD;  Surgeon: Harry Zamudio DPM;  [facility];  Service: Podiatry    AL MUSC MYOCUTANEOUS/FASCIOCUTANEOUS FLAP TRUNK Right 4/12/2021    Procedure: RECONSTRUCTION MICROSURGICAL W/ FREE FLAP;  Surgeon: Niraj Bojorquez MD;  [facility];  Service: Plastics    AL MUSC MYOCUTANEOUS/FASCIOCUTANEOUS FLAP TRUNK Right 4/12/2021    Procedure: TAKEBACK RECONSTRUCTION MICROSURGICAL W/ FREE FLAP;  Surgeon: Niraj Bojorquez MD;  [facility];  Service: Plastics    AL MUSC MYOCUTANEOUS/FASCIOCUTANEOUS FLAP TRUNK Right 4/13/2021    Procedure: RECONSTRUCTION MICROSURGICAL W/ FREE FLAP, RE EXPLORATION, MICRO VASCULAR REVISION;  Surgeon: Niraj Bojorquez MD;  [facility];  Service: Plastics    AL OSTC PRTL EXOSTC/CONDYLC METAR HEAD Right 12/29/2020    Procedure: EXCISION EXOSTOSIS;  Surgeon: Harry Zamudio DPM;  [facility];  Service: Podiatry    AL SECONDARY CLOSURE SURG WOUND/DEHSN XTNSV/COMP Right 12/29/2020    Procedure: PRIMARY DELAYED CLOSURE;  Surgeon: Harry Zamudio DPM;  [facility];  Service: Podiatry    TOE AMPUTATION Right     partial great toe    TONSILLECTOMY      VAC DRESSING APPLICATION Right 3/1/2021    Procedure: APPLICATION VAC DRESSING EXTREMITY;  Surgeon: Niraj Bojorquez MD;  [facility];  Service: Plastics    VASECTOMY  1992    WOUND DEBRIDEMENT Right 3/1/2021    Procedure: DEBRIDEMENT LOWER EXTREMITY (WASH OUT);  Surgeon: Niraj Bojorquez MD;  [facility];  Service: Plastics    WOUND  DEBRIDEMENT Right 2021    Procedure: DEBRIDEMENT RIGHT FOOT;  Surgeon: Niraj Bojorquez MD;  [facility];  Service: Plastics    WOUND DEBRIDEMENT Right 2021    Procedure: DEBRIDEMENT LOWER EXTREMITY (WASH OUT);  Surgeon: Niraj Bojorquez MD;  [facility];  Service: Plastics    WOUND DEBRIDEMENT Right 2021    Procedure: DEBRIDEMENT LOWER EXTREMITY (WASH OUT): RIGHT FOOT;  Surgeon: Niraj Bojorquez MD;  [facility];  Service: Plastics     Family History   Problem Relation Age of Onset    Diabetes Father         mellitus      Social History     Socioeconomic History    Marital status: /Civil Union     Spouse name: None    Number of children: None    Years of education: None    Highest education level: None   Occupational History    None   Tobacco Use    Smoking status: Former     Current packs/day: 0.00     Types: Cigarettes     Quit date: 1988     Years since quittin.3     Passive exposure: Past    Smokeless tobacco: Former    Tobacco comments:     exposure to passive smoke   Vaping Use    Vaping status: Never Used   Substance and Sexual Activity    Alcohol use: Yes     Comment: occasionally    Drug use: Not Currently    Sexual activity: Yes     Partners: Female   Other Topics Concern    None   Social History Narrative    None     Social Drivers of Health     Financial Resource Strain: Not on file   Food Insecurity: No Food Insecurity (2025)    Nursing - Inadequate Food Risk Classification     Worried About Running Out of Food in the Last Year: Never true     Ran Out of Food in the Last Year: Never true     Ran Out of Food in the Last Year: Never true   Transportation Needs: No Transportation Needs (2025)    Nursing - Transportation Risk Classification     Lack of Transportation: Not on file     Lack of Transportation: No   Physical Activity: Not on file   Stress: Not on file   Social Connections: Not on file   Intimate Partner  Violence: Unknown (2025)    Nursing IPS     Feels Physically and Emotionally Safe: Not on file     Physically Hurt by Someone: Not on file     Humiliated or Emotionally Abused by Someone: Not on file     Physically Hurt by Someone: No     Hurt or Threatened by Someone: No   Housing Stability: Unknown (2025)    Nursing: Inadequate Housing Risk Classification     Has Housing: Not on file     Worried About Losing Housing: Not on file     Unable to Get Utilities: Not on file     Unable to Pay for Housing in the Last Year: No     Has Housin        Current Outpatient Medications:     acetaminophen (Mapap Arthritis Pain) 650 mg CR tablet, Take 1 tablet (650 mg total) by mouth 2 (two) times a day (Patient not taking: Reported on 2025), Disp: 60 tablet, Rfl: 1    atorvastatin (LIPITOR) 40 mg tablet, Take 1 tablet (40 mg total) by mouth daily, Disp: 90 tablet, Rfl: 1    clopidogrel (Plavix) 75 mg tablet, Take 1 tablet (75 mg total) by mouth daily, Disp: 90 tablet, Rfl: 1    Continuous Glucose Sensor (FreeStyle Taylor 3 Sensor) MISC, Use 1 each every 14 (fourteen) days, Disp: 1 each, Rfl: 0    diltiazem (CARDIZEM) 120 MG tablet, Take 1 tablet (120 mg total) by mouth every 12 (twelve) hours, Disp: 180 tablet, Rfl: 1    Eliquis 5 MG, Take 1 tablet (5 mg total) by mouth 2 (two) times a day (Patient not taking: Reported on 2025), Disp: 60 tablet, Rfl: 11    escitalopram (LEXAPRO) 20 mg tablet, Take 1 tablet (20 mg total) by mouth daily, Disp: 90 tablet, Rfl: 0    glucose blood (Accu-Chek Guide) test strip, USE DAILY (Patient not taking: Reported on 2025), Disp: 100 strip, Rfl: 2    Insulin Glargine Solostar (Lantus SoloStar) 100 UNIT/ML SOPN, Inject 0.3 mL (30 Units total) under the skin daily at bedtime, Disp: 45 mL, Rfl: 1    insulin lispro (HumaLOG) 100 units/mL injection pen, Inject up to 20 Units under the skin 3 (three) times a day with meals, Disp: 15 mL, Rfl: 5    Insulin Pen Needle (Easy  Comfort Pen Needles) 33G X 5 MM MISC, USE AS DIRECTED, Disp: 100 each, Rfl: 4    Lancets (onetouch ultrasoft) lancets, Use as instructed (Patient not taking: Reported on 2/17/2025), Disp: 100 each, Rfl: 3    Lancets (onetouch ultrasoft) lancets, Use daily (Patient not taking: Reported on 2/17/2025), Disp: 100 each, Rfl: 3    lisinopril (ZESTRIL) 40 mg tablet, Take 1 tablet (40 mg total) by mouth daily, Disp: 90 tablet, Rfl: 1    metFORMIN (GLUCOPHAGE-XR) 750 mg 24 hr tablet, Take 1 tablet (750 mg total) by mouth 2 (two) times a day with meals, Disp: 180 tablet, Rfl: 1    metoprolol succinate (TOPROL-XL) 100 mg 24 hr tablet, Take 1 tablet (100 mg total) by mouth 2 (two) times a day, Disp: 180 tablet, Rfl: 0    mupirocin (BACTROBAN) 2 % ointment, Apply topically 3 (three) times a day (Patient not taking: Reported on 2/28/2025), Disp: 15 g, Rfl: 0    promethazine (PHENERGAN) 12.5 mg/10 mL oral solution, Take 10 mL (12.5 mg total) by mouth every 6 (six) hours as needed (cough), Disp: 118 mL, Rfl: 0    tamsulosin (FLOMAX) 0.4 mg, Take 1 capsule (0.4 mg total) by mouth daily with dinner, Disp: 90 capsule, Rfl: 1  No current facility-administered medications for this visit.    Review of Systems   Constitutional:  Negative for chills and fever.   Respiratory:  Negative for shortness of breath.    Cardiovascular:  Negative for chest pain.   Gastrointestinal:  Negative for nausea and vomiting.   Skin:  Positive for wound.   Neurological:  Positive for numbness. Negative for weakness.       Objective:  /94   Pulse 68   Temp 98.4 °F (36.9 °C)   Resp 16   Pain Score: 0-No pain     Physical Exam  Vitals and nursing note reviewed.   Constitutional:       General: He is not in acute distress.     Appearance: He is not toxic-appearing or diaphoretic.   Cardiovascular:      Rate and Rhythm: Normal rate and regular rhythm.      Pulses:           Dorsalis pedis pulses are 1+ on the right side and 1+ on the left side.         Posterior tibial pulses are 0 on the right side and 0 on the left side.   Pulmonary:      Effort: Pulmonary effort is normal. No respiratory distress.   Musculoskeletal:         General: Deformity present. No signs of injury.      Right lower leg: Edema present.      Left lower leg: Edema present.      Right foot: No foot drop.      Left foot: No foot drop.      Comments: S/P partial foot amputation right.   Skin:     General: Skin is warm.      Capillary Refill: Capillary refill takes less than 2 seconds.      Coloration: Skin is not cyanotic or mottled.      Findings: Wound present. No abscess.      Nails: There is no clubbing.      Comments: Ulcer noted left 3rd toe. It is covered with superficial eschar.  No drainage.  Eschar on right leg.  Parks 1 ulcer right plantar foot.  Decrease in size.  No deep probing.  Cellulitis resolved.  See wound assessment and photo.   Neurological:      General: No focal deficit present.      Mental Status: He is alert and oriented to person, place, and time.      Cranial Nerves: No cranial nerve deficit.      Sensory: Sensory deficit present.      Motor: No weakness.      Coordination: Coordination normal.   Psychiatric:         Mood and Affect: Mood normal.         Behavior: Behavior normal.         Thought Content: Thought content normal.         Judgment: Judgment normal.                 Wound 12/16/24 Diabetic Ulcer Foot Right;Plantar (Active)   Enter Parks score: Parks Grade 1: Partial or full-thickness ulcer (superficial) 03/10/25 1052   Wound Image Images linked 03/10/25 1126   Wound Description Epithelialization;Pink 03/10/25 1052   Lavern-wound Assessment Intact;Dry 03/10/25 1052   Wound Length (cm) 0.6 cm 03/10/25 1052   Wound Width (cm) 0.8 cm 03/10/25 1052   Wound Depth (cm) 0.3 cm 03/10/25 1052   Wound Surface Area (cm^2) 0.48 cm^2 03/10/25 1052   Wound Volume (cm^3) 0.144 cm^3 03/10/25 1052   Calculated Wound Volume (cm^3) 0.14 cm^3 03/10/25 1052   Change in Wound  Size % 36.36 03/10/25 1052   Undermining 1 0 03/10/25 1052   Undermining 1 is depth extending from resoved 03/10/25 1052   Drainage Amount Small 03/10/25 1052   Drainage Description Serosanguineous 03/10/25 1052   Non-staged Wound Description Full thickness 03/10/25 1052   Wound packed? No 03/10/25 1052       Wound 12/30/24 Diabetic Ulcer Toe D3, third Anterior;Left (Active)   Enter Parks score: Parks Grade 1: Partial or full-thickness ulcer (superficial) 03/10/25 1053   Wound Image Images linked 03/10/25 1050   Wound Description Epithelialization;Dry 03/10/25 1053   Lavern-wound Assessment Intact;Dry;Scaly 03/10/25 1053   Wound Length (cm) 0.1 cm 03/10/25 1053   Wound Width (cm) 0.1 cm 03/10/25 1053   Wound Depth (cm) 0 cm 03/10/25 1053   Wound Surface Area (cm^2) 0.01 cm^2 03/10/25 1053   Wound Volume (cm^3) 0 cm^3 03/10/25 1053   Calculated Wound Volume (cm^3) 0 cm^3 03/10/25 1053   Change in Wound Size % 100 03/10/25 1053   Undermining 1 0 03/10/25 1053   Undermining 1 is depth extending from resolved 03/10/25 1053   Drainage Amount None 03/10/25 1053   Wound packed? No 03/10/25 1053       Wound 03/03/25 Leg Right;Anterior (Active)   Wound Image Images linked 03/10/25 1050   Wound Description Brown;Eschar;Pink 03/10/25 1054   Lavern-wound Assessment Intact;Edema 03/10/25 1054   Wound Length (cm) 1.9 cm 03/10/25 1054   Wound Width (cm) 1.8 cm 03/10/25 1054   Wound Depth (cm) 0.1 cm 03/10/25 1054   Wound Surface Area (cm^2) 3.42 cm^2 03/10/25 1054   Wound Volume (cm^3) 0.342 cm^3 03/10/25 1054   Calculated Wound Volume (cm^3) 0.34 cm^3 03/10/25 1054   Change in Wound Size % 38.18 03/10/25 1054   Drainage Amount Unable to assess 03/10/25 1054   Non-staged Wound Description Full thickness 03/10/25 1054   Wound packed? No 03/10/25 1054           Results from last 6 Months   Lab Units 02/17/25  1118   WOUND CULTURE  2+ Growth of Staphylococcus aureus*         Lab Results   Component Value Date    HGBA1C 7.7 (H)  01/16/2025       Wound Instructions:  Orders Placed This Encounter   Procedures    Debridement Diabetic Ulcer Right;Plantar Foot     This order was created via procedure documentation    Wound cleansing and dressings Diabetic Ulcer Anterior;Left Toe D3, third     Wash your hands with soap and water.  Remove old dressing, discard into plastic bag and place in trash.  Cleanse the wound with gentle unscented soap and water prior to applying a clean dressing. Do not use tissue or cotton balls. Do not scrub the wound. Pat dry using gauze.     Shower no     Left third toe:   STOP Apply acticoat 3 to the left third toe wound.    Cover with gauze  Secure with roll gauze  Change dressing 3x week.        Off-loading Instructions:     Keep weight and pressure off wound at all times.   Wear off-loading device as directed by your physician. Put on immediately when rising in the morning and remove when going to bed.  Wear sugical shoe or open toed shoe. Ensure that there is no rubbing to wound.   Limit your walking     Follow up at the wound center in one week.     Standing Status:   Future     Expiration Date:   3/17/2025    Wound cleansing and dressings Diabetic Ulcer Right;Plantar Foot     Wash your hands with soap and water.  Remove old dressing, discard into plastic bag and place in trash.  Cleanse the wound with gentle unscnted soap and water prior to applying a clean dressing. Do not use tissue or cotton balls. Do not scrub the wound. Pat dry using gauze.     Shower no       Right foot wound:   Apply acticoat 3 to the right foot wound.    Cover with ABD  Secure with roll gauze  Change dressing 3x week      Elastic Tubular Stocking Spandagrip F     Tubular elastic bandage: Apply from base of toes to behind the knee. Apply in AM, may remove for sleep.  Avoid prolonged standing in one place.  Elevate leg(s) above the level of the heart when sitting or as much as possible.      Follow up at the wound center in one week.      Standing Status:   Future     Expiration Date:   3/17/2025    Wound cleansing and dressings Right Pretibial     Wash your hands with soap and water.  Remove old dressing, discard into plastic bag and place in trash.  Cleanse the wound with mild soap and water prior to applying a clean dressing. Do not use tissue or cotton balls. Do not scrub the wound. Pat dry using gauze.  Shower yes, only on the days you change the dressings.       Right leg wound:   Cover with a cosmopor bordered gauze.   Change dressing three times a week.       Spandagrip F compression sleeve from base of toes to knee. You can keep on for sleeping.       Elevate your legs above the level of your heart to reduce swelling.        Follow up at the wound center in one week.     Standing Status:   Future     Expiration Date:   3/17/2025    Wound Procedure Treatment Diabetic Ulcer Anterior;Left Toe D3, third     This order was created via procedure documentation    Wound Procedure Treatment Diabetic Ulcer Right;Plantar Foot     This order was created via procedure documentation    Wound Procedure Treatment Right;Anterior Leg     This order was created via procedure documentation             Satnam Ponce DPM

## 2025-03-10 NOTE — PATIENT INSTRUCTIONS
Orders Placed This Encounter   Procedures    Debridement Diabetic Ulcer Right;Plantar Foot     This order was created via procedure documentation    Wound cleansing and dressings Diabetic Ulcer Anterior;Left Toe D3, third     Wash your hands with soap and water.  Remove old dressing, discard into plastic bag and place in trash.  Cleanse the wound with gentle unscented soap and water prior to applying a clean dressing. Do not use tissue or cotton balls. Do not scrub the wound. Pat dry using gauze.     Shower no     Left third toe:   STOP Apply acticoat 3 to the left third toe wound.    Cover with gauze  Secure with roll gauze  Change dressing 3x week.        Off-loading Instructions:     Keep weight and pressure off wound at all times.   Wear off-loading device as directed by your physician. Put on immediately when rising in the morning and remove when going to bed.  Wear sugical shoe or open toed shoe. Ensure that there is no rubbing to wound.   Limit your walking     Follow up at the wound center in one week.     Standing Status:   Future     Expiration Date:   3/17/2025    Wound cleansing and dressings Diabetic Ulcer Right;Plantar Foot     Wash your hands with soap and water.  Remove old dressing, discard into plastic bag and place in trash.  Cleanse the wound with gentle unscnted soap and water prior to applying a clean dressing. Do not use tissue or cotton balls. Do not scrub the wound. Pat dry using gauze.     Shower no       Right foot wound:   Apply acticoat 3 to the right foot wound.    Cover with ABD  Secure with roll gauze  Change dressing 3x week      Elastic Tubular Stocking Spandagrip F     Tubular elastic bandage: Apply from base of toes to behind the knee. Apply in AM, may remove for sleep.  Avoid prolonged standing in one place.  Elevate leg(s) above the level of the heart when sitting or as much as possible.      Follow up at the wound center in one week.     Standing Status:   Future     Expiration  Date:   3/17/2025    Wound cleansing and dressings Right Pretibial     Wash your hands with soap and water.  Remove old dressing, discard into plastic bag and place in trash.  Cleanse the wound with mild soap and water prior to applying a clean dressing. Do not use tissue or cotton balls. Do not scrub the wound. Pat dry using gauze.  Shower yes, only on the days you change the dressings.       Right leg wound:   Cover with a cosmopor bordered gauze.   Change dressing three times a week.       Spandagrip F compression sleeve from base of toes to knee. You can keep on for sleeping.       Elevate your legs above the level of your heart to reduce swelling.        Follow up at the wound center in one week.     Standing Status:   Future     Expiration Date:   3/17/2025    Wound Procedure Treatment Diabetic Ulcer Anterior;Left Toe D3, third     This order was created via procedure documentation    Wound Procedure Treatment Diabetic Ulcer Right;Plantar Foot     This order was created via procedure documentation    Wound Procedure Treatment Right;Anterior Leg     This order was created via procedure documentation

## 2025-03-10 NOTE — PROGRESS NOTES
Wound Procedure Treatment Diabetic Ulcer Anterior;Left Toe D3, third    Performed by: Tanner Nielson RN  Authorized by: Satnam Ponce DPM  Associated wounds:   Wound 12/30/24 Diabetic Ulcer Toe D3, third Anterior;Left    Applied secondary dressing:  Gauze   Dressing secured with:  Tape

## 2025-03-12 ENCOUNTER — TELEMEDICINE (OUTPATIENT)
Dept: FAMILY MEDICINE CLINIC | Facility: CLINIC | Age: 75
End: 2025-03-12

## 2025-03-12 DIAGNOSIS — E11.40 TYPE 2 DIABETES MELLITUS WITH DIABETIC NEUROPATHY, WITH LONG-TERM CURRENT USE OF INSULIN (HCC): Primary | ICD-10-CM

## 2025-03-12 DIAGNOSIS — F32.A ANXIETY AND DEPRESSION: ICD-10-CM

## 2025-03-12 DIAGNOSIS — F41.9 ANXIETY AND DEPRESSION: ICD-10-CM

## 2025-03-12 DIAGNOSIS — Z79.4 TYPE 2 DIABETES MELLITUS WITH DIABETIC NEUROPATHY, WITH LONG-TERM CURRENT USE OF INSULIN (HCC): Primary | ICD-10-CM

## 2025-03-12 DIAGNOSIS — Z79.899 MEDICATION MANAGEMENT: ICD-10-CM

## 2025-03-12 PROCEDURE — PBNCHG PB NO CHARGE PLACEHOLDER: Performed by: PHARMACIST

## 2025-03-12 NOTE — PROGRESS NOTES
[facility] Pharmacy [facility]   Juan Manuel Villalobos PharmD     Won Gaitan Jr. is a 74 y.o. male who was referred to the clinical pharmacist by Pieter Torres DO for diabetes. Patient presents via telephone for follow-up.      Virtual Care Documentation  Encounter provider John Kerns    The Patient is located at Home and in the following state in which I hold an active license: [place].    The patient was identified by name and date of birth. Won Gaitan Jr. was informed that this is a telemedicine visit and that the visit is being conducted through the [employer] Teams platform. He agrees to proceed.  My office door was closed. No one else was in the room. He acknowledged consent and understanding of privacy and security of the telephone platform. The patient has agreed to participate and understands they can discontinue the visit at any time.       Assessment & Plan  Type 2 diabetes mellitus with diabetic neuropathy, with long-term current use of insulin ([facility]ly [facility])    Lab Results   Component Value Date    HGBA1C 7.7 (H) 01/16/2025     -  Most recent A1c: above goal.   - CGM TIR: above goal; TBR meets meet goal.   - Medications: well tolerated; denies any side effects from current medications.    Improvement noted in patient's TIR. Patient achieving goals for TIR, TAR, and TBR.     No medication changes warranted. Encouraged patient to limit consumption of high-carb snacks.     Understand limited physical activity due to weight-bearing restrictions set by Wound Care/Podiatry.    Anxiety and depression  PCP recently increased Lexapro from 10 mg to 20 mg daily.     Also encouraged patient to limit watching the news as this negatively impacts his mood.   Medication management    Follow-up:   Follow-up with pharmacist in 2 months  Next PCP visit: 4/28/2025    - Home Monitoring Records: CGM data.  - Labs: A1c due on or after 4/16/2025    Subjective:     Won Gaitan Jr. is a 74 y.o. male with a  history of type 2 diabetes with long term use of insulin. Diabetes course has been improving with treatment.     2/17: ED visit for R foot cellulitis. Patient follows closely with Wound Care & Podiatry    2/28: Patient seen by PCP for URI    Patient has received Taylor 3 Plus sensors with latest shipment. Reports better adherence and no technical issues.     Review of Systems   Constitutional:  Negative for fatigue.   Eyes:         No blurry vision   Endocrine: Negative for polydipsia, polyphagia and polyuria.        No hypoglycemia   Neurological:  Positive for numbness.   Psychiatric/Behavioral:  Positive for dysphoric mood.      DM Self-Management:  - Self-monitoring: is performed regularly.  He checks glucose levels very frequently using CGM (> 30 scans/day). He uses smartphone as . Patient is connected to clinic [employer] account.   - Hypoglycemic episodes: none recently - History of Level 3/severe hypoglycemic event: none   - Hypoglycemia symptoms: weak, shaky;   symptomatic with glucose levels 80-90  - Treatment of hypoglycemia: discussed treatment   Glucometer: Yes, Brand: Accu-Check Guide Me   CGM: Yes, Brand: FreeStyle Taylor 3     Current DM Regimen:  Humalog 18-20 units SQ AC  Lantus 30-35 units SQ HS  Metformin 750 mg BID    DM History:  Health Status: Healthy; Goals - A1c: <7-7.5%; Time In Range: >70% with TBR <1%: per ADA Guidelines for older adults  Microvascular complications: nephropathy and peripheral neuropathy; S/P transmetatarsal amputation of R foot; S/P amputation of L lesser toe. Followed closely by Podiatry   Cardiovascular complications: none  - Statin: Yes   - ACEi/ARB: Yes    Previous DM medications:   Januvia  Glimepiride  Ozempic (cost)    Cardiometabolic Risk Factors:  Diabetes Composite Score: 4   Values used to calculate this score:    Points  Metrics       0        Blood Pressure: 166/94       1        Prescribed Statins: Yes       1        Hemoglobin A1c: 7.7%       1         Smokes Tobacco: No       1        Prescribed Aspirin: N/A - patient does not meet cardiovascular risk criteria    - HF: No    - CKD: normal GFR with albuminuria     Drug Safety:  - Thyroid cancer: No  - History of pancreatitis: No    Drug Therapy Problems:  - Medication Adherence: Responsible for medication management: patient and spouse. Patient is compliant all of the time. Takes insulin as scheduled; however, has history of self-adjusting insulin doses.     CGM Data Review:     Device: FreeStyle Taylor 3  Dates: 2/27 - 3/12/2025  Usage: 96%  Average glucose (mg/dL): 160  GMI (approx. lab A1c): 7.1%  Glycemic variability (CV): 27.6%    Glycemic patterns:  well controlled throughout the day, smooth and tight glucose control  Hypoglycemia: none       Metric CGM Target   Time above range (TAR) Very High >250 mg/dL 3 <5%   Time above range (TAR) High >180 mg/dL 26 <25%   Time in range (TIR)  mg/dL 71 >70%   Time below range (TBR) Low <70 mg/dL 0 <4%    Time below range (TBR)Very Low <54 mg/dL 0 <1%     More than 72 hours of data was reviewed. Report to be scanned to chart.          Meds/Allergies     Current Outpatient Medications:     acetaminophen (Mapap Arthritis Pain) 650 mg CR tablet, Take 1 tablet (650 mg total) by mouth 2 (two) times a day (Patient not taking: Reported on 2/28/2025), Disp: 60 tablet, Rfl: 1    atorvastatin (LIPITOR) 40 mg tablet, Take 1 tablet (40 mg total) by mouth daily, Disp: 90 tablet, Rfl: 1    clopidogrel (Plavix) 75 mg tablet, Take 1 tablet (75 mg total) by mouth daily, Disp: 90 tablet, Rfl: 1    Continuous Glucose Sensor (FreeStyle Taylor 3 Sensor) MISC, Use 1 each every 14 (fourteen) days, Disp: 1 each, Rfl: 0    diltiazem (CARDIZEM) 120 MG tablet, Take 1 tablet (120 mg total) by mouth every 12 (twelve) hours, Disp: 180 tablet, Rfl: 1    Eliquis 5 MG, Take 1 tablet (5 mg total) by mouth 2 (two) times a day (Patient not taking: Reported on 2/28/2025), Disp: 60 tablet, Rfl: 11     escitalopram (LEXAPRO) 20 mg tablet, Take 1 tablet (20 mg total) by mouth daily, Disp: 90 tablet, Rfl: 0    glucose blood (Accu-Chek Guide) test strip, USE DAILY (Patient not taking: Reported on 2/28/2025), Disp: 100 strip, Rfl: 2    Insulin Glargine Solostar (Lantus SoloStar) 100 UNIT/ML SOPN, Inject 0.3 mL (30 Units total) under the skin daily at bedtime, Disp: 45 mL, Rfl: 1    insulin lispro (HumaLOG) 100 units/mL injection pen, Inject up to 20 Units under the skin 3 (three) times a day with meals, Disp: 15 mL, Rfl: 5    Insulin Pen Needle (Easy Comfort Pen Needles) 33G X 5 MM MISC, USE AS DIRECTED, Disp: 100 each, Rfl: 4    Lancets (onetouch ultrasoft) lancets, Use as instructed (Patient not taking: Reported on 2/17/2025), Disp: 100 each, Rfl: 3    Lancets (onetouch ultrasoft) lancets, Use daily (Patient not taking: Reported on 2/17/2025), Disp: 100 each, Rfl: 3    lisinopril (ZESTRIL) 40 mg tablet, Take 1 tablet (40 mg total) by mouth daily, Disp: 90 tablet, Rfl: 1    metFORMIN (GLUCOPHAGE-XR) 750 mg 24 hr tablet, Take 1 tablet (750 mg total) by mouth 2 (two) times a day with meals, Disp: 180 tablet, Rfl: 1    metoprolol succinate (TOPROL-XL) 100 mg 24 hr tablet, Take 1 tablet (100 mg total) by mouth 2 (two) times a day, Disp: 180 tablet, Rfl: 0    mupirocin (BACTROBAN) 2 % ointment, Apply topically 3 (three) times a day (Patient not taking: Reported on 2/28/2025), Disp: 15 g, Rfl: 0    promethazine (PHENERGAN) 12.5 mg/10 mL oral solution, Take 10 mL (12.5 mg total) by mouth every 6 (six) hours as needed (cough), Disp: 118 mL, Rfl: 0    tamsulosin (FLOMAX) 0.4 mg, Take 1 capsule (0.4 mg total) by mouth daily with dinner, Disp: 90 capsule, Rfl: 1    Allergies   Allergen Reactions    Simvastatin Myalgia    Itraconazole Other (See Comments)     Pt denies knowledge of allergy..       Administrative Statements   Pharmacist Tracking Tool:   Reason For Outreach: Embedded Pharmacist  Demographics:  Intervention Method:  Phone  Type of Intervention: Follow-Up  Topics Addressed: Diabetes  Pharmacologic Interventions: N/A  Non-Pharmacologic Interventions: Personal CGM  Time:  Direct Patient Care:  20  mins  Care Coordination:  15  mins  Recommendation Recipient: Patient/Caregiver  Outcome: Accepted    Documentation under collaborative practice agreement. Orders pended for PCP signature if needed.    Juan Manuel Villalobos, PharmD  Clinical Integration Pharmacist  [facility]

## 2025-03-12 NOTE — ASSESSMENT & PLAN NOTE
PCP recently increased Lexapro from 10 mg to 20 mg daily.     Also encouraged patient to limit watching the news as this negatively impacts his mood.

## 2025-03-12 NOTE — ASSESSMENT & PLAN NOTE
Lab Results   Component Value Date    HGBA1C 7.7 (H) 01/16/2025     -  Most recent A1c: above goal.   - CGM TIR: above goal; TBR meets meet goal.   - Medications: well tolerated; denies any side effects from current medications.    Improvement noted in patient's TIR. Patient achieving goals for TIR, TAR, and TBR.     No medication changes warranted. Encouraged patient to limit consumption of high-carb snacks.     Understand limited physical activity due to weight-bearing restrictions set by Wound Care/Podiatry.

## 2025-03-17 ENCOUNTER — OFFICE VISIT (OUTPATIENT)
Dept: WOUND CARE | Facility: CLINIC | Age: 75
End: 2025-03-17
Payer: COMMERCIAL

## 2025-03-17 VITALS
TEMPERATURE: 98.1 F | DIASTOLIC BLOOD PRESSURE: 86 MMHG | HEART RATE: 72 BPM | RESPIRATION RATE: 16 BRPM | SYSTOLIC BLOOD PRESSURE: 152 MMHG

## 2025-03-17 DIAGNOSIS — E11.40 TYPE 2 DIABETES MELLITUS WITH DIABETIC NEUROPATHY, UNSPECIFIED WHETHER LONG TERM INSULIN USE (HCC): ICD-10-CM

## 2025-03-17 DIAGNOSIS — L97.521 ULCER OF TOE OF LEFT FOOT, LIMITED TO BREAKDOWN OF SKIN (HCC): ICD-10-CM

## 2025-03-17 DIAGNOSIS — S81.801A TRAUMATIC OPEN WOUND OF RIGHT LOWER LEG, INITIAL ENCOUNTER: ICD-10-CM

## 2025-03-17 DIAGNOSIS — L97.412 ULCER OF HEEL AND MIDFOOT, RIGHT, WITH FAT LAYER EXPOSED (HCC): Primary | ICD-10-CM

## 2025-03-17 PROCEDURE — 99213 OFFICE O/P EST LOW 20 MIN: CPT | Performed by: PODIATRIST

## 2025-03-17 PROCEDURE — 11042 DBRDMT SUBQ TIS 1ST 20SQCM/<: CPT | Performed by: PODIATRIST

## 2025-03-17 NOTE — PROGRESS NOTES
Patient ID: Won Gaitan Jr. is a 75 y.o. male Date of Birth 1950       Chief Complaint   Patient presents with    Follow Up Wound Care Visit     Follow up visit for wound to right foot & right leg. Pt denies any issues or concerns since last visit.        Allergies:  Simvastatin and Itraconazole    Assessments:      Diagnosis ICD-10-CM Associated Orders   1. Ulcer of heel and midfoot, right, with fat layer exposed ([facility])  L97.412 Debridement Diabetic Ulcer Right;Plantar Foot     Wound cleansing and dressings Diabetic Ulcer Right;Plantar Foot     Wound cleansing and dressings Right;Anterior Leg     Wound Procedure Treatment Diabetic Ulcer Right;Plantar Foot      2. Ulcer of toe of left foot, limited to breakdown of skin ([facility])  L97.521 Wound cleansing and dressings Diabetic Ulcer Anterior;Left Toe D3, third     Wound Procedure Treatment Diabetic Ulcer Anterior;Left Toe D3, third      3. Traumatic open wound of right lower leg, initial encounter  S81.801A Wound Procedure Treatment Right;Anterior Leg     Debridement Right;Anterior Leg      4. Type 2 diabetes mellitus with diabetic neuropathy, unspecified whether long term insulin use ([facility])  E11.40 Debridement Diabetic Ulcer Right;Plantar Foot           Plan:  Reviewed medical records.  Patient was counseled and educated on the condition and the diagnosis.    2.  The diagnosis, treatment options and prognosis were discussed with the patient.    3.  Wounds in right foot and leg debrided.  Continue local care.  Protective dressing to left 3rd toe.  Stressed on pt compliance about proper off-loading and staying off of his feet.  Discussed risks related to his condition and non-compliance including more amputation / infection.    4.  Possible TCC depending on the progress.   5.  Surgical shoe on left foot.  Spandigrip for compression of legs.  Instructed proper elevation of legs.    6.  RA in 1 week.         Imaging: I have personally reviewed pertinent films in  "PACS  Labs, pathology, and Other Studies: I have personally reviewed pertinent reports.        Debridement     Date/Time: 3/17/2025 11:15 AM  Universal Protocol:  Consent: Verbal consent obtained.  Risks and benefits: risks, benefits and alternatives were discussed  Consent given by: patient  Time out: Immediately prior to procedure a \"time out\" was called to verify the correct patient, procedure, equipment, support staff and site/side marked as required.  Timeout called at: 3/17/2025 11:42 AM.  Patient understanding: patient states understanding of the procedure being performed  Patient identity confirmed: verbally with patient    Debridement Details  Performed by: physician  Debridement type: surgical  Level of debridement: subcutaneous tissue  Pain control: lidocaine 4%    Post-debridement measurements  Length (cm): 1  Width (cm): 1  Depth (cm): 0.3  Percent debrided: 100%  Surface Area (cm^2): 1  Area Debrided (cm^2): 1  Volume (cm^3): 0.3    Tissue and other material debrided: dermis, epidermis and subcutaneous tissue  Devitalized tissue debrided: callus, fibrin and slough  Instrument(s) utilized: blade  Technique utilized: excisional  Bleeding: small  Hemostasis obtained with: pressure  Procedural pain (0-10): 0  Post-procedural pain: 0   Response to treatment: procedure was tolerated well    Debridement   Wound 03/03/25 Leg Right;Anterior     Date/Time: 3/17/2025 11:15 AM  Universal Protocol:  Consent: Verbal consent obtained.  Risks and benefits: risks, benefits and alternatives were discussed  Consent given by: patient  Time out: Immediately prior to procedure a \"time out\" was called to verify the correct patient, procedure, equipment, support staff and site/side marked as required.  Timeout called at: 3/17/2025 11:54 AM.  Patient understanding: patient states understanding of the procedure being performed  Patient identity confirmed: verbally with patient    Debridement Details  Performed by: " physician  Debridement type: surgical  Level of debridement: subcutaneous tissue    Post-debridement measurements  Length (cm): 2.8  Width (cm): 2.4  Depth (cm): 0.1  Percent debrided: 100%  Surface Area (cm^2): 6.72  Area Debrided (cm^2): 6.72  Volume (cm^3): 0.67    Tissue and other material debrided: dermis, epidermis and subcutaneous tissue  Devitalized tissue debrided: fibrin and slough  Instrument(s) utilized: blade  Technique utilized: excisional  Bleeding: small  Hemostasis obtained with: pressure  Procedural pain (0-10): 0  Post-procedural pain: 0   Response to treatment: procedure was tolerated well           Subjective:     HPI  The patient presents for evaluation and treatment of wounds.  BS under control.  Increased drainage from right leg wound.     The following portions of the patient's history were reviewed and updated as appropriate:   Patient Active Problem List   Diagnosis    Hyperlipidemia    Type 2 diabetes mellitus with diabetic neuropathy ([facility])    Atrial fibrillation ([facility])    Right middle lobe pulmonary nodule    Type 2 diabetes mellitus with other skin ulcer (CODE) ([facility])    Essential hypertension    Benign prostatic hyperplasia with lower urinary tract symptoms    Microalbuminuria    Ulcer of right foot with fat layer exposed ([facility])    Ambulatory dysfunction    S/P transmetatarsal amputation of foot, right ([facility])    Sleep disturbance    Anxiety and depression    S/P R ALT flap to right foot    Gait abnormality    Vasomotor rhinitis    Corns and callosities    Tinea unguium    Cellulitis of left lower leg    Puncture wound of left foot with foreign body, subsequent encounter    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 ([facility])    Personal history of COVID-19    Bronchitis    S/P amputation of lesser toe, left ([facility])    Ulcer of toe of left foot, limited to breakdown of skin ([facility])    Medication management    Subconjunctival hemorrhage  "of right eye    Viral URI with cough    Ulcer of heel and midfoot, right, with fat layer exposed ([facility])     Past Medical History:   Diagnosis Date    Arthritis     Atrial fibrillation ([facility])     Diagnosed 11/2018    Benign prostate hyperplasia 04/2002    Cellulitis     right lower leg     Colon polyp 2006    Diabetes mellitus ([facility])     Hearing aid worn     bilat    Hearing loss     90% loss left ear and 40% right ear    History of clubfoot     \"since birth\"    History of pneumonia     History of TIA (transient ischemic attack) 04/25/2017 11/30/18 pt denies    Hyperlipidemia 5/15/2014    Hypertension     Infectious viral hepatitis     \"cant remember type\"    Irregular heart beat     Afib    Neuropathy     both feet    Osteomyelitis ([facility])     right great toe    Pneumonia     Right middle lobe pulmonary nodule 11/6/2018    Seasickness     Teeth missing     Type 2 diabetes mellitus with diabetic neuropathy ([facility]) 11/6/2018    Wears glasses     reading    Wound, open     bottom of right foot     Past Surgical History:   Procedure Laterality Date    BUNIONECTOMY Right 12/4/2018    Procedure: 5TH METATARSAL BONE PARTIAL RESECTION, FULL THICKNESS DEBRIDEMENT OF DIABETIC ULCER;  Surgeon: Shala Flynn DPM;  [facility];  Service: Podiatry    CLOSURE DELAYED PRIMARY Left 10/30/2023    Procedure: CLOSURE DELAYED PRIMARY left foot;  Surgeon: Minh Urena DPM;  [facility];  Service: Podiatry    CLUB FOOT RELEASE Bilateral     COLONOSCOPY      COMPLEX WOUND CLOSURE TO EXTREMITY  4/27/2021    Procedure: COMPLEX WOUND CLOSURE TO EXTREMITY: RIGHT FOOT;  Surgeon: Niraj Bojorquez MD;  [facility];  Service: Plastics    EXTERNAL FIXATOR APPLICATION Right 2/12/2021    Procedure: Application multiplane external fixation;  Surgeon: Harry Zamudio DPM;  [facility];  Service: Podiatry    FOOT HARDWARE REMOVAL Right 2/17/2021    Procedure: REMOVAL EXTERNAL FIXATOR;  Surgeon: Harry" DONOVAN Zamudio;  [facility];  Service: Podiatry    FOREIGN BODY REMOVAL Right 4/27/2021    Procedure: REMOVAL FOREIGN BODY EXTREMITY: RIGHT FOOT;  Surgeon: Niraj Bojorquez MD;  [facility];  Service: Plastics    INCISION AND DRAINAGE OF WOUND Right 2/17/2021    Procedure: INCISION AND DRAINAGE (I&D) EXTREMITY;  Surgeon: Harry Zamudio DPM;  [facility];  Service: Podiatry    INCISION AND DRAINAGE OF WOUND Left 10/26/2023    Procedure: INCISION AND DRAINAGE (I&D) EXTREMITY; WASHOUT; REMOVAL OF FOREIGN BODY;  Surgeon: Satnam Ponce DPM;  [facility];  Service: Podiatry    ORIF FOOT FRACTURE Right 3/4/2021    Procedure: VAC PLACEMENT; SCREW FIXATION RIGHT FOOT FUSION;  Surgeon: Harry Zamudio DPM;  [facility];  Service: Podiatry    AZ AMPUTATION FOOT TRANSMETARSAL Right 2/22/2021    Procedure: AMPUTATION TRANSMETATARSAL (TMA), REMOVAL NAIL IM T2 ICF HARDWARE;  Surgeon: Harry Zamudio DPM;  [facility];  Service: Podiatry    AZ AMPUTATION METATARSAL W/TOE SINGLE Left 11/29/2023    Procedure: LEFT FOURTH RAY RESECTION FOOT;  Surgeon: Harry Zamudio DPM;  [facility];  Service: Podiatry    AZ ARTHRD MIDTARSL/TARS MLT/TRANSVRS W/OSTEOT Right 2/12/2021    Procedure: foot ARTHRODESIS/FUSION;  Surgeon: Harry Zamudio DPM;  [facility];  Service: Podiatry    AZ DEBRIDEMENT BONE 1ST 20 SQ CM/< Right 12/2/2021    Procedure: DEBRIDEMENT OF TARSAL BONES WITH ANITBIODIC BEADS;  Surgeon: Harry Zamudio DPM;  [facility];  Service: Podiatry    AZ LNGTH/SHRT TENDON LEG/ANKLE 1 TENDON SPX Right 2/12/2021    Procedure: LENGTHEN TIBIAL TENDON, TRANS HEEL CORD;  Surgeon: Harry Zamudio DPM;  [facility];  Service: Podiatry    AZ MUSC MYOCUTANEOUS/FASCIOCUTANEOUS FLAP TRUNK Right 4/12/2021    Procedure: RECONSTRUCTION MICROSURGICAL W/ FREE FLAP;  Surgeon: Niraj Bojorquez MD;  [facility];  Service: Plastics    AZ  MUSC MYOCUTANEOUS/FASCIOCUTANEOUS FLAP TRUNK Right 4/12/2021    Procedure: TAKEBACK RECONSTRUCTION MICROSURGICAL W/ FREE FLAP;  Surgeon: Niraj Bojorquez MD;  [facility];  Service: Plastics    GA MUSC MYOCUTANEOUS/FASCIOCUTANEOUS FLAP TRUNK Right 4/13/2021    Procedure: RECONSTRUCTION MICROSURGICAL W/ FREE FLAP, RE EXPLORATION, MICRO VASCULAR REVISION;  Surgeon: Niraj Bojorquez MD;  [facility];  Service: Plastics    GA OSTC PRTL EXOSTC/CONDYLC METAR HEAD Right 12/29/2020    Procedure: EXCISION EXOSTOSIS;  Surgeon: Harry Zmaudio DPM;  [facility];  Service: Podiatry    GA SECONDARY CLOSURE SURG WOUND/DEHSN XTNSV/COMP Right 12/29/2020    Procedure: PRIMARY DELAYED CLOSURE;  Surgeon: Harry Zamudio DPM;  [facility];  Service: Podiatry    TOE AMPUTATION Right     partial great toe    TONSILLECTOMY      VAC DRESSING APPLICATION Right 3/1/2021    Procedure: APPLICATION VAC DRESSING EXTREMITY;  Surgeon: Niraj Bojorquez MD;  [facility];  Service: Plastics    VASECTOMY  1992    WOUND DEBRIDEMENT Right 3/1/2021    Procedure: DEBRIDEMENT LOWER EXTREMITY (WASH OUT);  Surgeon: Niraj Bojorquez MD;  [facility];  Service: Plastics    WOUND DEBRIDEMENT Right 4/7/2021    Procedure: DEBRIDEMENT RIGHT FOOT;  Surgeon: Niraj Bojorquez MD;  [facility];  Service: Plastics    WOUND DEBRIDEMENT Right 4/12/2021    Procedure: DEBRIDEMENT LOWER EXTREMITY (WASH OUT);  Surgeon: Niraj Bojorquez MD;  [facility];  Service: Plastics    WOUND DEBRIDEMENT Right 4/27/2021    Procedure: DEBRIDEMENT LOWER EXTREMITY (WASH OUT): RIGHT FOOT;  Surgeon: Niraj Bojorquez MD;  [facility];  Service: Plastics     Family History   Problem Relation Age of Onset    Diabetes Father         mellitus      Social History     Socioeconomic History    Marital status: /Civil Union     Spouse name: None    Number of  children: None    Years of education: None    Highest education level: None   Occupational History    None   Tobacco Use    Smoking status: Former     Current packs/day: 0.00     Types: Cigarettes     Quit date: 1988     Years since quittin.3     Passive exposure: Past    Smokeless tobacco: Former    Tobacco comments:     exposure to passive smoke   Vaping Use    Vaping status: Never Used   Substance and Sexual Activity    Alcohol use: Yes     Comment: occasionally    Drug use: Not Currently    Sexual activity: Yes     Partners: Female   Other Topics Concern    None   Social History Narrative    None     Social Drivers of Health     Financial Resource Strain: Not on file   Food Insecurity: No Food Insecurity (2025)    Nursing - Inadequate Food Risk Classification     Worried About Running Out of Food in the Last Year: Never true     Ran Out of Food in the Last Year: Never true     Ran Out of Food in the Last Year: Never true   Transportation Needs: No Transportation Needs (2025)    Nursing - Transportation Risk Classification     Lack of Transportation: Not on file     Lack of Transportation: No   Physical Activity: Not on file   Stress: Not on file   Social Connections: Not on file   Intimate Partner Violence: Unknown (2025)    Nursing IPS     Feels Physically and Emotionally Safe: Not on file     Physically Hurt by Someone: Not on file     Humiliated or Emotionally Abused by Someone: Not on file     Physically Hurt by Someone: No     Hurt or Threatened by Someone: No   Housing Stability: Unknown (2025)    Nursing: Inadequate Housing Risk Classification     Has Housing: Not on file     Worried About Losing Housing: Not on file     Unable to Get Utilities: Not on file     Unable to Pay for Housing in the Last Year: No     Has Housin        Current Outpatient Medications:     acetaminophen (Mapap Arthritis Pain) 650 mg CR tablet, Take 1 tablet (650 mg total) by mouth 2 (two) times a  day (Patient not taking: Reported on 2/28/2025), Disp: 60 tablet, Rfl: 1    atorvastatin (LIPITOR) 40 mg tablet, Take 1 tablet (40 mg total) by mouth daily, Disp: 90 tablet, Rfl: 1    clopidogrel (Plavix) 75 mg tablet, Take 1 tablet (75 mg total) by mouth daily, Disp: 90 tablet, Rfl: 1    Continuous Glucose Sensor (FreeStyle Taylor 3 Sensor) MISC, Use 1 each every 14 (fourteen) days, Disp: 1 each, Rfl: 0    diltiazem (CARDIZEM) 120 MG tablet, Take 1 tablet (120 mg total) by mouth every 12 (twelve) hours, Disp: 180 tablet, Rfl: 1    Eliquis 5 MG, Take 1 tablet (5 mg total) by mouth 2 (two) times a day (Patient not taking: Reported on 2/28/2025), Disp: 60 tablet, Rfl: 11    escitalopram (LEXAPRO) 20 mg tablet, Take 1 tablet (20 mg total) by mouth daily, Disp: 90 tablet, Rfl: 0    glucose blood (Accu-Chek Guide) test strip, USE DAILY (Patient not taking: Reported on 2/28/2025), Disp: 100 strip, Rfl: 2    Insulin Glargine Solostar (Lantus SoloStar) 100 UNIT/ML SOPN, Inject 0.3 mL (30 Units total) under the skin daily at bedtime, Disp: 45 mL, Rfl: 1    insulin lispro (HumaLOG) 100 units/mL injection pen, Inject up to 20 Units under the skin 3 (three) times a day with meals, Disp: 15 mL, Rfl: 5    Insulin Pen Needle (Easy Comfort Pen Needles) 33G X 5 MM MISC, USE AS DIRECTED, Disp: 100 each, Rfl: 4    Lancets (onetouch ultrasoft) lancets, Use as instructed (Patient not taking: Reported on 2/17/2025), Disp: 100 each, Rfl: 3    Lancets (onetouch ultrasoft) lancets, Use daily (Patient not taking: Reported on 2/17/2025), Disp: 100 each, Rfl: 3    lisinopril (ZESTRIL) 40 mg tablet, Take 1 tablet (40 mg total) by mouth daily, Disp: 90 tablet, Rfl: 1    metFORMIN (GLUCOPHAGE-XR) 750 mg 24 hr tablet, Take 1 tablet (750 mg total) by mouth 2 (two) times a day with meals, Disp: 180 tablet, Rfl: 1    metoprolol succinate (TOPROL-XL) 100 mg 24 hr tablet, Take 1 tablet (100 mg total) by mouth 2 (two) times a day, Disp: 180 tablet, Rfl:  0    mupirocin (BACTROBAN) 2 % ointment, Apply topically 3 (three) times a day (Patient not taking: Reported on 2/28/2025), Disp: 15 g, Rfl: 0    promethazine (PHENERGAN) 12.5 mg/10 mL oral solution, Take 10 mL (12.5 mg total) by mouth every 6 (six) hours as needed (cough), Disp: 118 mL, Rfl: 0    tamsulosin (FLOMAX) 0.4 mg, Take 1 capsule (0.4 mg total) by mouth daily with dinner, Disp: 90 capsule, Rfl: 1    Review of Systems   Constitutional:  Negative for chills and fever.   Respiratory:  Negative for shortness of breath.    Cardiovascular:  Negative for chest pain.   Gastrointestinal:  Negative for nausea and vomiting.   Skin:  Positive for wound.   Neurological:  Positive for numbness. Negative for weakness.       Objective:  /86   Pulse 72   Temp 98.1 °F (36.7 °C) (Tympanic)   Resp 16   Pain Score: 0-No pain     Physical Exam  Vitals and nursing note reviewed.   Constitutional:       General: He is not in acute distress.     Appearance: He is not toxic-appearing or diaphoretic.   Cardiovascular:      Rate and Rhythm: Normal rate and regular rhythm.      Pulses:           Dorsalis pedis pulses are 1+ on the right side and 1+ on the left side.        Posterior tibial pulses are 0 on the right side and 0 on the left side.   Pulmonary:      Effort: Pulmonary effort is normal. No respiratory distress.   Musculoskeletal:         General: Deformity present. No signs of injury.      Right lower leg: Edema present.      Left lower leg: Edema present.      Right foot: No foot drop.      Left foot: No foot drop.      Comments: S/P partial foot amputation right.   Skin:     General: Skin is warm.      Capillary Refill: Capillary refill takes less than 2 seconds.      Coloration: Skin is not cyanotic or mottled.      Findings: Wound present. No abscess.      Nails: There is no clubbing.      Comments: Ulcer noted left 3rd toe. It is covered with superficial eschar.  No drainage.  No redness.  Wound is little worse  in right leg with fibrous wound bed.  No cellulitis.  Parks 1 ulcer right plantar foot.  Decrease in size.  No deep probing.  Mild keratosis noted.  No signs of infection.  Increased granular tissues.  See wound assessment and photo.   Neurological:      General: No focal deficit present.      Mental Status: He is alert and oriented to person, place, and time.      Cranial Nerves: No cranial nerve deficit.      Sensory: Sensory deficit present.      Motor: No weakness.      Coordination: Coordination normal.   Psychiatric:         Mood and Affect: Mood normal.         Behavior: Behavior normal.         Thought Content: Thought content normal.         Judgment: Judgment normal.                            Results from last 6 Months   Lab Units 02/17/25  1118   WOUND CULTURE  2+ Growth of Staphylococcus aureus*         Lab Results   Component Value Date    HGBA1C 7.7 (H) 01/16/2025       Wound Instructions:  Orders Placed This Encounter   Procedures    Debridement Diabetic Ulcer Right;Plantar Foot     This order was created via procedure documentation    Wound cleansing and dressings Diabetic Ulcer Anterior;Left Toe D3, third     Wound cleansing and dressings Diabetic Ulcer Anterior;Left Toe D3, third       Wash your hands with soap and water.  Remove old dressing, discard into plastic bag and place in trash.  Cleanse the wound with gentle unscented soap and water prior to applying a clean dressing. Do not use tissue or cotton balls. Do not scrub the wound. Pat dry using gauze.     Shower no      Left third toe:   Cover with gauze  Secure with roll gauze  Change dressing 3x week.     Off-loading Instructions:     Keep weight and pressure off wound at all times.   Wear off-loading device as directed by your physician. Put on immediately when rising in the morning and remove when going to bed.  Wear sugical shoe or open toed shoe. Ensure that there is no rubbing to wound.   Limit your walking     Follow up at the wound  center in one week.     Standing Status:   Future     Expiration Date:   3/24/2025    Wound cleansing and dressings Diabetic Ulcer Right;Plantar Foot     Wound cleansing and dressings Diabetic Ulcer Right;Plantar Foot   Wash your hands with soap and water.  Remove old dressing, discard into plastic bag and place in trash.  Cleanse the wound with gentle unscnted soap and water prior to applying a clean dressing. Do not use tissue or cotton balls. Do not scrub the wound. Pat dry using gauze.     Shower no         Right foot wound:   Apply acticoat 3 to the right foot wound.    Cover with ABD  Secure with roll gauze  Change dressing 3x week      Elastic Tubular Stocking Spandagrip F     Tubular elastic bandage: Apply from base of toes to behind the knee. Apply in AM, may remove for sleep.  Avoid prolonged standing in one place.  Elevate leg(s) above the level of the heart when sitting or as much as possible.        Follow up at the wound center in one week.     Standing Status:   Future     Expiration Date:   3/24/2025    Wound cleansing and dressings Right;Anterior Leg     Wound cleansing and dressings Right Pretibial     Wash your hands with soap and water.  Remove old dressing, discard into plastic bag and place in trash.  Cleanse the wound with mild soap and water prior to applying a clean dressing. Do not use tissue or cotton balls. Do not scrub the wound. Pat dry using gauze.  Shower yes, only on the days you change the dressings.         Right leg wound:   Apply zinc oxide to skin surrounding wound  Apply silver alginate to the right lower leg wound.  Cover with ABD  Secure with roll gauze  Change dressing every 2 days or as needed for strikethrough drainage       Elastic Tubular Stocking Spandagrip F    Tubular elastic bandage: Apply from base of toes to behind the knee. Apply in AM, may remove for sleep.  Avoid prolonged standing in one place.  Elevate leg(s) above the level of the heart when sitting or as  much as possible.         Follow up at the wound center in one week.     Standing Status:   Future     Expiration Date:   3/24/2025    Wound Procedure Treatment Right;Anterior Leg     This order was created via procedure documentation    Wound Procedure Treatment Diabetic Ulcer Right;Plantar Foot     This order was created via procedure documentation    Wound Procedure Treatment Diabetic Ulcer Anterior;Left Toe D3, third     This order was created via procedure documentation    Debridement Right;Anterior Leg     This order was created via procedure documentation             Satnam Ponce DPM

## 2025-03-17 NOTE — PROGRESS NOTES
Wound Procedure Treatment Right;Anterior Leg    Performed by: Pinky Gonzalez RN  Authorized by: Satnam Ponce DPM  Associated wounds:   Wound 03/03/25 Leg Right;Anterior    Wound cleansed with:  NSS   Applied to periwound:  Zinc oxide paste   Applied primary dressing:  Silver and Calcium alginate   Applied secondary dressing:  ABD   Dressing secured with:  Kacy, Tape, Elastic tubular stocking and Size F   Wound Procedure Treatment Diabetic Ulcer Right;Plantar Foot    Performed by: Pinky Gonzalez RN  Authorized by: Satnam Ponce DPM  Associated wounds:   Wound 12/16/24 Diabetic Ulcer Foot Right;Plantar    Applied primary dressing:  Acticoat   Applied secondary dressing:  ABD   Dressing secured with:  Kacy and Tape   Offloading device appllied:  CAM   Comments:  Acticoat 3   Wound Procedure Treatment Diabetic Ulcer Anterior;Left Toe D3, third    Performed by: Pinky Gonzalez RN  Authorized by: Satnam Ponce DPM  Associated wounds:   Wound 12/30/24 Diabetic Ulcer Toe D3, third Anterior;Left    Applied secondary dressing:  Gauze   Dressing secured with:  Kacy, Tape and Surgilast   Offloading device appllied:  Surgical shoe

## 2025-03-17 NOTE — PATIENT INSTRUCTIONS
Orders Placed This Encounter   Procedures    Debridement Diabetic Ulcer Right;Plantar Foot     This order was created via procedure documentation    Wound cleansing and dressings Diabetic Ulcer Anterior;Left Toe D3, third     Wound cleansing and dressings Diabetic Ulcer Anterior;Left Toe D3, third       Wash your hands with soap and water.  Remove old dressing, discard into plastic bag and place in trash.  Cleanse the wound with gentle unscented soap and water prior to applying a clean dressing. Do not use tissue or cotton balls. Do not scrub the wound. Pat dry using gauze.     Shower no      Left third toe:   Cover with gauze  Secure with roll gauze  Change dressing 3x week.     Off-loading Instructions:     Keep weight and pressure off wound at all times.   Wear off-loading device as directed by your physician. Put on immediately when rising in the morning and remove when going to bed.  Wear sugical shoe or open toed shoe. Ensure that there is no rubbing to wound.   Limit your walking     Follow up at the wound center in one week.     Standing Status:   Future     Expiration Date:   3/24/2025    Wound cleansing and dressings Diabetic Ulcer Right;Plantar Foot     Wound cleansing and dressings Diabetic Ulcer Right;Plantar Foot   Wash your hands with soap and water.  Remove old dressing, discard into plastic bag and place in trash.  Cleanse the wound with gentle unscnted soap and water prior to applying a clean dressing. Do not use tissue or cotton balls. Do not scrub the wound. Pat dry using gauze.     Shower no         Right foot wound:   Apply acticoat 3 to the right foot wound.    Cover with ABD  Secure with roll gauze  Change dressing 3x week      Elastic Tubular Stocking Spandagrip F     Tubular elastic bandage: Apply from base of toes to behind the knee. Apply in AM, may remove for sleep.  Avoid prolonged standing in one place.  Elevate leg(s) above the level of the heart when sitting or as much as possible.         Follow up at the wound center in one week.     Standing Status:   Future     Expiration Date:   3/24/2025    Wound cleansing and dressings Right;Anterior Leg     Wound cleansing and dressings Right Pretibial     Wash your hands with soap and water.  Remove old dressing, discard into plastic bag and place in trash.  Cleanse the wound with mild soap and water prior to applying a clean dressing. Do not use tissue or cotton balls. Do not scrub the wound. Pat dry using gauze.  Shower yes, only on the days you change the dressings.         Right leg wound:   Apply zinc oxide to skin surrounding wound  Apply silver alginate to the right lower leg wound.  Cover with ABD  Secure with roll gauze  Change dressing every 2 days or as needed for strikethrough drainage       Elastic Tubular Stocking Spandagrip F    Tubular elastic bandage: Apply from base of toes to behind the knee. Apply in AM, may remove for sleep.  Avoid prolonged standing in one place.  Elevate leg(s) above the level of the heart when sitting or as much as possible.         Follow up at the wound center in one week.     Standing Status:   Future     Expiration Date:   3/24/2025

## 2025-03-18 ENCOUNTER — TELEPHONE (OUTPATIENT)
Age: 75
End: 2025-03-18

## 2025-03-18 NOTE — TELEPHONE ENCOUNTER
[facility]  Juan Manuel Villalobos, PharmD     Incoming call from patient. States that he was told he does not have a script for his FreeStyle Taylor 3 sensors.    Contacted [facility] via [employer]. Message from representative:  Hello, we advised patient today 3/18 that the Prescription on file for the Taylor 3 Sensors will  on 2025, also the Face to Face Summary note date of service for 24 has . Not sure why we haven't contacted your office and apologize for that. Could you please send us an updated script and recent diabetic office visit note within the last six months. Thank you kindly.    Will send new CGM orders to DME

## 2025-03-18 NOTE — TELEPHONE ENCOUNTER
Patient stated he called pharmacy regarding script for free style margie but was advised they have not received a script. Patient stated it might have been prescribe by Juan Manuel, the pharmacist. He will give her a call.

## 2025-03-24 ENCOUNTER — OFFICE VISIT (OUTPATIENT)
Dept: WOUND CARE | Facility: CLINIC | Age: 75
End: 2025-03-24
Payer: COMMERCIAL

## 2025-03-24 VITALS — SYSTOLIC BLOOD PRESSURE: 156 MMHG | DIASTOLIC BLOOD PRESSURE: 88 MMHG | TEMPERATURE: 97.6 F

## 2025-03-24 DIAGNOSIS — S81.801A TRAUMATIC OPEN WOUND OF RIGHT LOWER LEG, INITIAL ENCOUNTER: ICD-10-CM

## 2025-03-24 DIAGNOSIS — E11.40 TYPE 2 DIABETES MELLITUS WITH DIABETIC NEUROPATHY, UNSPECIFIED WHETHER LONG TERM INSULIN USE (HCC): ICD-10-CM

## 2025-03-24 DIAGNOSIS — L97.412 ULCER OF HEEL AND MIDFOOT, RIGHT, WITH FAT LAYER EXPOSED (HCC): Primary | ICD-10-CM

## 2025-03-24 DIAGNOSIS — L97.521 ULCER OF TOE OF LEFT FOOT, LIMITED TO BREAKDOWN OF SKIN (HCC): ICD-10-CM

## 2025-03-24 PROCEDURE — 97597 DBRDMT OPN WND 1ST 20 CM/<: CPT | Performed by: NURSE PRACTITIONER

## 2025-03-24 RX ORDER — LIDOCAINE 40 MG/G
CREAM TOPICAL ONCE
Status: COMPLETED | OUTPATIENT
Start: 2025-03-24 | End: 2025-03-24

## 2025-03-24 RX ADMIN — LIDOCAINE: 40 CREAM TOPICAL at 09:53

## 2025-03-24 NOTE — PROGRESS NOTES
Wound Procedure Treatment    Performed by: Stephanie White RN  Authorized by: KIRIT Long  Associated wounds:   Wound 12/16/24 Diabetic Ulcer Foot Right;Plantar  Wound 12/30/24 Diabetic Ulcer Toe D3, third Anterior;Left    Wound cleansed with:  Wound aggrssively cleansed with NSS and gauze   Applied primary dressing:  Acticoat   Applied secondary dressing:  Gauze   Dressing secured with:  Tape and Kacy

## 2025-03-24 NOTE — PATIENT INSTRUCTIONS
Orders Placed This Encounter   Procedures    Wound cleansing and dressings     Wash your hands with soap and water.  Remove old dressing, discard into plastic bag and place in trash.    Cleanse the wound with gentle unscented soap and water prior to applying a clean dressing.   Do not use tissue or cotton balls. Do not scrub the wound. Pat dry using gauze.     Shower no      Right Plantar Foot & Left third toe:   Apply Acticoat 3 to wound.  Cover with gauze  Secure with tape  Change dressing 3x week.     Off-loading Instructions:   Keep weight and pressure off wound at all times.   Wear off-loading device as directed by your physician. Put on immediately when rising in the morning and remove when going to bed.  Wear sugical shoe or open toed shoe. Ensure that there is no rubbing to wound.   Limit your walking     Follow up at the wound center in one week.     Standing Status:   Future     Expiration Date:   3/31/2025    Wound cleansing and dressings Right;Anterior Leg     Wound cleansing and dressings Right Pretibial             Wash your hands with soap and water.  Remove old dressing, discard into plastic bag and place in trash.    Cleanse the wound with mild soap and water prior to applying a clean dressing.   Do not use tissue or cotton balls. Do not scrub the wound. Pat dry using gauze.    Shower yes, only on the days you change the dressings.       Right leg wound:   Apply zinc oxide to skin surrounding wound  Apply Acticoat 3 followed by calcium alginate to the right lower leg wound.  Cover with gauze  Secure with roll gauze (ok to use bordered gauze dressing if drainage permits)   Change dressing every 2 days or as needed for strikethrough drainage       Elastic Tubular Stocking Spandagrip F     Tubular elastic bandage: Apply from base of toes to behind the knee. Apply in AM, may remove for sleep.  Avoid prolonged standing in one place.  Elevate leg(s) above the level of the heart when sitting or as much as  possible.         Follow up at the wound center in one week.     Standing Status:   Future     Expiration Date:   3/31/2025

## 2025-03-24 NOTE — PROGRESS NOTES
Wound Procedure Treatment Right;Anterior Leg    Performed by: Stephanie White RN  Authorized by: KIRIT Long  Associated wounds:   Wound 03/03/25 Leg Right;Anterior    Wound cleansed with:  Wound aggrssively cleansed with NSS and gauze   Applied to periwound:  Zinc oxide paste   Applied primary dressing:  Acticoat   Applied secondary dressing:  Gauze   Dressing secured with:  Tape

## 2025-03-24 NOTE — ASSESSMENT & PLAN NOTE
Orders:    lidocaine (LMX) 4 % cream    Wound cleansing and dressings; Future    Wound cleansing and dressings Right;Anterior Leg; Future    Wound Procedure Treatment    Wound Procedure Treatment Right;Anterior Leg

## 2025-03-24 NOTE — PROGRESS NOTES
Name: Won Gaitan Jr.      : 1950      MRN: [id number]  Encounter Provider: KIRIT Long  Encounter Date: 3/24/2025   Encounter department: [facility]  :  Assessment & Plan  Ulcer of heel and midfoot, right, with fat layer exposed (HCC)    Orders:    lidocaine (LMX) 4 % cream    Wound cleansing and dressings; Future    Wound cleansing and dressings Right;Anterior Leg; Future    Wound Procedure Treatment    Wound Procedure Treatment Right;Anterior Leg    Ulcer of toe of left foot, limited to breakdown of skin (HCC)    Orders:    lidocaine (LMX) 4 % cream    Wound cleansing and dressings; Future    Wound cleansing and dressings Right;Anterior Leg; Future    Wound Procedure Treatment    Wound Procedure Treatment Right;Anterior Leg    Traumatic open wound of right lower leg, initial encounter    Orders:    lidocaine (LMX) 4 % cream    Wound cleansing and dressings; Future    Wound cleansing and dressings Right;Anterior Leg; Future    Wound Procedure Treatment    Wound Procedure Treatment Right;Anterior Leg    Type 2 diabetes mellitus with diabetic neuropathy, unspecified whether long term insulin use (HCC)    Lab Results   Component Value Date    HGBA1C 7.7 (H) 2025            Plan:  1.  F/U visit.  Wounds debrided.  Wounds measuring slightly smaller.  Will change treatment to Acticoat Flex 3 to all wounds change 3 times a week.  2.  A1C results reviewed with the patient today.  Patient is continue to follow recommendations to achieve tight glycemic control  3.  Patient will follow-up in 1 week with Dr. Ponce    History of Present Illness   Chief Complaint   Patient presents with    Follow Up Wound Care Visit     Follow up visit for wound to right leg, right foot & left 3rd toe   Here for wound follow up.  F/u visit for Parks grade 1 diabetic foot ulcers of his bilateral feet and traumatic wound of his right lower leg.  No new complaints.  He denies any pain,  fevers, or chills.      Objective   /88   Temp 97.6 °F (36.4 °C)       Wound 12/16/24 Diabetic Ulcer Foot Right;Plantar (Active)   Wound Image   03/24/25 0939   Enter Parks score: Parks Grade 1: Partial or full-thickness ulcer (superficial) 03/24/25 0939   Wound Description Pink 03/24/25 0939   Non-staged Wound Description Full thickness 03/24/25 0939   Wound Length (cm) 0.6 cm 03/24/25 0939   Wound Width (cm) 0.9 cm 03/24/25 0939   Wound Depth (cm) 0.1 cm 03/24/25 0939   Wound Surface Area (cm^2) 0.54 cm^2 03/24/25 0939   Wound Volume (cm^3) 0.054 cm^3 03/24/25 0939   Calculated Wound Volume (cm^3) 0.05 cm^3 03/24/25 0939   Change in Wound Size % 77.27 03/24/25 0939   Number of underminings 1 02/17/25 0954   Undermining 1 0.4 03/24/25 0939   Undermining 1 is depth extending from 6-8 o'clock, deepest @ 7 o'clock 03/24/25 0939   Drainage Amount Small 03/24/25 0939   Drainage Description Serosanguineous;Yellow 03/24/25 0939   Lavern-wound Assessment Intact;Dry 03/24/25 0939   Wound packed? No 03/10/25 1052   Dressing Status Removed 03/24/25 0939       Wound 12/30/24 Diabetic Ulcer Toe D3, third Anterior;Left (Active)   Wound Image   03/24/25 0945   Enter Parks score: Parks Grade 1: Partial or full-thickness ulcer (superficial) 03/24/25 0945   Wound Description Dry;Yellow;Epithelialization 03/24/25 0945   Non-staged Wound Description Full thickness 03/17/25 1100   Wound Length (cm) 0.4 cm 03/24/25 0945   Wound Width (cm) 0.3 cm 03/24/25 0945   Wound Depth (cm) 0 cm 03/24/25 0945   Wound Surface Area (cm^2) 0.12 cm^2 03/24/25 0945   Wound Volume (cm^3) 0 cm^3 03/24/25 0945   Calculated Wound Volume (cm^3) 0 cm^3 03/24/25 0945   Change in Wound Size % 100 03/24/25 0945   Number of underminings 1 02/17/25 0955   Undermining 1 0 03/10/25 1053   Undermining 1 is depth extending from resolved 03/10/25 1053   Drainage Amount None 03/24/25 0945   Drainage Description Serosanguineous 03/03/25 0921   Lavern-wound  "Assessment Intact 03/24/25 0945   Wound packed? No 03/10/25 1053   Dressing Status Removed 03/24/25 0945       Wound 03/03/25 Leg Right;Anterior (Active)   Wound Image   03/17/25 1155   Wound Description Pink;Yellow 03/24/25 0949   Non-staged Wound Description Full thickness 03/24/25 0949   Wound Length (cm) 1.5 cm 03/24/25 0949   Wound Width (cm) 1.5 cm 03/24/25 0949   Wound Depth (cm) 0.1 cm 03/24/25 0949   Wound Surface Area (cm^2) 2.25 cm^2 03/24/25 0949   Wound Volume (cm^3) 0.225 cm^3 03/24/25 0949   Calculated Wound Volume (cm^3) 0.23 cm^3 03/24/25 0949   Change in Wound Size % 58.18 03/24/25 0949   Drainage Amount Moderate 03/24/25 0949   Drainage Description Serosanguineous;Yellow 03/24/25 0949   Lavern-wound Assessment Fragile;Edema 03/24/25 0949   Wound packed? No 03/10/25 1054   Dressing Status Removed 03/24/25 0949       Debridement   Wound 12/16/24 Diabetic Ulcer Foot Right;Plantar     Date/Time: 3/24/2025 9:30 AM  Universal Protocol:  procedure performed by consultantConsent: Written consent obtained.  Consent given by: patient  Time out: Immediately prior to procedure a \"time out\" was called to verify the correct patient, procedure, equipment, support staff and site/side marked as required.  Timeout called at: 3/24/2025 1:49 PM.  Patient identity confirmed: verbally with patient    Debridement Details  Performed by: NP  Debridement type: selective  Pain control: lidocaine 4%    Post-debridement measurements  Length (cm): 0.6  Width (cm): 0.9  Depth (cm): 0.1  Percent debrided: 100%  Surface Area (cm^2): 0.54  Area Debrided (cm^2): 0.54  Volume (cm^3): 0.05    Devitalized tissue debrided: biofilm, fibrin and slough  Instrument(s) utilized: curette  Bleeding: small  Hemostasis obtained with: pressure  Procedural pain (0-10): 0  Post-procedural pain: 0   Response to treatment: procedure was tolerated well    Debridement   Wound 12/30/24 Diabetic Ulcer Toe D3, third Anterior;Left     Date/Time: 3/24/2025 " "9:30 AM  Universal Protocol:  procedure performed by consultantConsent: Written consent obtained.  Consent given by: patient  Time out: Immediately prior to procedure a \"time out\" was called to verify the correct patient, procedure, equipment, support staff and site/side marked as required.  Timeout called at: 3/24/2025 1:49 PM.  Patient identity confirmed: verbally with patient    Debridement Details  Performed by: NP  Debridement type: selective  Pain control: lidocaine 4%    Post-debridement measurements  Length (cm): 0.4  Width (cm): 0.3  Depth (cm): 0.1  Percent debrided: 100%  Surface Area (cm^2): 0.12  Area Debrided (cm^2): 0.12  Volume (cm^3): 0.01    Devitalized tissue debrided: biofilm, fibrin and slough  Instrument(s) utilized: curette  Bleeding: small  Hemostasis obtained with: pressure  Procedural pain (0-10): 0  Post-procedural pain: 0   Response to treatment: procedure was tolerated well    Debridement   Wound 03/03/25 Leg Right;Anterior     Date/Time: 3/24/2025 9:30 AM  Universal Protocol:  procedure performed by consultantConsent: Written consent obtained.  Consent given by: patient  Time out: Immediately prior to procedure a \"time out\" was called to verify the correct patient, procedure, equipment, support staff and site/side marked as required.  Timeout called at: 3/24/2025 1:50 PM.  Patient identity confirmed: verbally with patient    Debridement Details  Performed by: NP  Debridement type: selective  Pain control: lidocaine 4%    Post-debridement measurements  Length (cm): 1.5  Width (cm): 1.5  Depth (cm): 0.1  Percent debrided: 100%  Surface Area (cm^2): 2.25  Area Debrided (cm^2): 2.25  Volume (cm^3): 0.23    Devitalized tissue debrided: biofilm, fibrin and slough  Instrument(s) utilized: curette  Bleeding: small  Hemostasis obtained with: pressure  Procedural pain (0-10): 0  Post-procedural pain: 0   Response to treatment: procedure was tolerated well       Results from last 6 Months   Lab " Units 02/17/25  1118   WOUND CULTURE  2+ Growth of Staphylococcus aureus*

## 2025-03-30 PROBLEM — J06.9 VIRAL URI WITH COUGH: Status: RESOLVED | Noted: 2025-02-28 | Resolved: 2025-03-30

## 2025-03-31 ENCOUNTER — OFFICE VISIT (OUTPATIENT)
Dept: WOUND CARE | Facility: CLINIC | Age: 75
End: 2025-03-31
Payer: COMMERCIAL

## 2025-03-31 VITALS
HEART RATE: 72 BPM | SYSTOLIC BLOOD PRESSURE: 162 MMHG | TEMPERATURE: 97.1 F | RESPIRATION RATE: 16 BRPM | DIASTOLIC BLOOD PRESSURE: 86 MMHG

## 2025-03-31 DIAGNOSIS — L97.412 ULCER OF HEEL AND MIDFOOT, RIGHT, WITH FAT LAYER EXPOSED (HCC): Primary | ICD-10-CM

## 2025-03-31 DIAGNOSIS — S81.801A TRAUMATIC OPEN WOUND OF RIGHT LOWER LEG, INITIAL ENCOUNTER: ICD-10-CM

## 2025-03-31 DIAGNOSIS — E11.40 TYPE 2 DIABETES MELLITUS WITH DIABETIC NEUROPATHY, UNSPECIFIED WHETHER LONG TERM INSULIN USE (HCC): ICD-10-CM

## 2025-03-31 DIAGNOSIS — L97.521 ULCER OF TOE OF LEFT FOOT, LIMITED TO BREAKDOWN OF SKIN (HCC): ICD-10-CM

## 2025-03-31 PROCEDURE — 11042 DBRDMT SUBQ TIS 1ST 20SQCM/<: CPT | Performed by: PODIATRIST

## 2025-03-31 PROCEDURE — 99213 OFFICE O/P EST LOW 20 MIN: CPT | Performed by: PODIATRIST

## 2025-03-31 RX ORDER — LIDOCAINE 40 MG/G
CREAM TOPICAL ONCE
Status: COMPLETED | OUTPATIENT
Start: 2025-03-31 | End: 2025-03-31

## 2025-03-31 RX ADMIN — LIDOCAINE: 40 CREAM TOPICAL at 10:08

## 2025-03-31 NOTE — PATIENT INSTRUCTIONS
Orders Placed This Encounter   Procedures    Debridement Diabetic Ulcer Right;Plantar Foot     This order was created via procedure documentation    Debridement Right;Anterior Leg     This order was created via procedure documentation    Wound cleansing and dressings     Wash your hands with soap and water.  Remove old dressing, discard into plastic bag and place in trash.    Cleanse the wound with gentle unscented soap and water prior to applying a clean dressing.   Do not use tissue or cotton balls. Do not scrub the wound. Pat dry using gauze.     Shower? Only with protection. Do not get cast wet!!      Right Plantar Foot & right leg wounds  Apply Acticoat 7 to wound.  Cover with abd pad.  Secure with rolled gauze & tape  Offloading felt to right heel.      Change dressing weekly in wound center with TCC change.     Off-loading Instructions:   Keep weight and pressure off wound at all times.   Wear off-loading device as directed by your physician. Put on immediately when rising in the morning and remove when going to bed.  Wear sugical shoe or open toed shoe. Ensure that there is no rubbing to wound.   Limit your walking     Follow up at the wound center in one week.     Standing Status:   Future     Expiration Date:   4/7/2025    Wound cleansing and dressings Diabetic Ulcer Anterior;Left Toe D3, third     Wash your hands with soap and water.  Remove old dressing, discard into plastic bag and place in trash.    Cleanse the wound with mild soap and water prior to applying a clean dressing.   Do not use tissue or cotton balls. Do not scrub the wound. Pat dry using gauze.     Shower yes, only on the days you change the dressings.       Left 3rd toe  Apply Acticoat 7 followed by calcium alginate to the right lower leg wound.  Cover with gauze  Secure with roll gauze & tape  Change dressing weekly & as needed for soilage dislodgement.     Standing Status:   Future     Expiration Date:   4/7/2025

## 2025-03-31 NOTE — PROGRESS NOTES
Wound Procedure Treatment    Performed by: Stephanie White RN  Authorized by: Satnam Ponce DPM  Associated wounds:   Wound 12/16/24 Diabetic Ulcer Foot Right;Plantar  Wound 03/03/25 Leg Right;Anterior    Wound cleansed with:  Wound aggrssively cleansed with NSS and gauze   Applied primary dressing:  Acticoat and Calcium alginate   Applied secondary dressing:  ABD   Dressing secured with:  Kacy and Tape   Wound Procedure Treatment Diabetic Ulcer Anterior;Left Toe D3, third    Performed by: Stephanie White RN  Authorized by: Satnam Ponce DPM  Associated wounds:   Wound 12/30/24 Diabetic Ulcer Toe D3, third Anterior;Left    Wound cleansed with:  Wound aggrssively cleansed with NSS and gauze   Applied primary dressing:  Acticoat and Calcium alginate   Applied secondary dressing:  Gauze   Dressing secured with:  Tape

## 2025-03-31 NOTE — PROGRESS NOTES
Patient ID: Won Gaitan Jr. is a 75 y.o. male Date of Birth 1950       Chief Complaint   Patient presents with    Follow Up Wound Care Visit     Follow up visit for wound to right foot & Left foot & right leg.  Pt denies any issues or concerns since last visit.        Allergies:  Simvastatin and Itraconazole    Assessments:      Diagnosis ICD-10-CM Associated Orders   1. Ulcer of heel and midfoot, right, with fat layer exposed ([facility])  L97.412 lidocaine (LMX) 4 % cream     Splint, Casting, Strapping     Wound Procedure Treatment     Wound cleansing and dressings     Wound cleansing and dressings Diabetic Ulcer Anterior;Left Toe D3, third     Wound Procedure Treatment Diabetic Ulcer Anterior;Left Toe D3, third      2. Ulcer of toe of left foot, limited to breakdown of skin ([facility])  L97.521 lidocaine (LMX) 4 % cream     Wound Procedure Treatment     Wound cleansing and dressings     Wound cleansing and dressings Diabetic Ulcer Anterior;Left Toe D3, third     Wound Procedure Treatment Diabetic Ulcer Anterior;Left Toe D3, third      3. Type 2 diabetes mellitus with diabetic neuropathy, unspecified whether long term insulin use ([facility])  E11.40 lidocaine (LMX) 4 % cream     Splint, Casting, Strapping     Wound Procedure Treatment     Wound cleansing and dressings     Wound cleansing and dressings Diabetic Ulcer Anterior;Left Toe D3, third     Wound Procedure Treatment Diabetic Ulcer Anterior;Left Toe D3, third      4. Traumatic open wound of right lower leg, initial encounter  S81.801A lidocaine (LMX) 4 % cream     Debridement Right;Anterior Leg     Wound Procedure Treatment     Wound cleansing and dressings     Wound cleansing and dressings Diabetic Ulcer Anterior;Left Toe D3, third     Wound Procedure Treatment Diabetic Ulcer Anterior;Left Toe D3, third           Plan:  Reviewed medical records.  Patient was counseled and educated on the condition and the diagnosis.    2.  The diagnosis, treatment options and  "prognosis were discussed with the patient.    3.  Wounds in right leg debrided.  No significant improvement on right foot ulcer.  Undermined skin and keratosis removed.  He is agreeable to TCC.  TCC applied per protocol.  Protective dressing to left 3rd toe.  Stressed on pt compliance about proper off-loading and staying off of his feet.  Discussed risks related to his condition and non-compliance including more amputation / infection.    4.  Surgical shoe on left foot.  Instructed proper elevation of legs.    5.  RA in 1 week.         Imaging: I have personally reviewed pertinent films in PACS  Labs, pathology, and Other Studies: I have personally reviewed pertinent reports.        Debridement   Wound 03/03/25 Leg Right;Anterior     Date/Time: 3/31/2025 9:30 AM  Universal Protocol:  Consent: Verbal consent obtained.  Risks and benefits: risks, benefits and alternatives were discussed  Consent given by: patient  Time out: Immediately prior to procedure a \"time out\" was called to verify the correct patient, procedure, equipment, support staff and site/side marked as required.  Timeout called at: 3/31/2025 10:18 AM.  Patient understanding: patient states understanding of the procedure being performed  Patient identity confirmed: verbally with patient    Debridement Details  Performed by: physician  Debridement type: surgical  Level of debridement: subcutaneous tissue    Post-debridement measurements  Length (cm): 1.8  Width (cm): 1.1  Depth (cm): 0.1  Percent debrided: 100%  Surface Area (cm^2): 1.98  Area Debrided (cm^2): 1.98  Volume (cm^3): 0.2    Tissue and other material debrided: dermis, epidermis and subcutaneous tissue  Devitalized tissue debrided: biofilm, fibrin and slough  Instrument(s) utilized: blade  Technique utilized: excisional  Bleeding: small  Hemostasis obtained with: pressure  Procedural pain (0-10): 0  Post-procedural pain: 0   Response to treatment: procedure was tolerated well    Splint, " Casting, Strapping    Date/Time: 3/31/2025 9:30 AM    Performed by: Satnam Ponce DPM  Authorized by: Satnam Ponec DPM    Verbal consent obtained?: Yes    Risks and benefits: Risks, benefits and alternatives were discussed    Consent given by:  Patient  Time Out:     Time out: Immediately prior to the procedure a time out was called      Time out performed at:  3/31/2025 10:19 AM  Patient states understanding of procedure being performed: Yes    Patient identity confirmed:  Verbally with patient  Procedure details:     Laterality:  Right    Location:  Foot    Foot:  R foot    Cast type:  Total contact    Splint composition: static      Supplies:  Cotton padding, sling, fiberglass, elastic bandage and skin protective strip  Post-procedure details:     Patient tolerance of procedure:  Tolerated well, no immediate complications         Subjective:     HPI  The patient presents for evaluation and treatment of wounds.  BS under control.  He presents with old his wife's CAM boot on right side.  FBS was 150.  No pain in his feet.        The following portions of the patient's history were reviewed and updated as appropriate:   Patient Active Problem List   Diagnosis    Hyperlipidemia    Type 2 diabetes mellitus with diabetic neuropathy ([facility])    Atrial fibrillation ([facility])    Right middle lobe pulmonary nodule    Type 2 diabetes mellitus with other skin ulcer (CODE) ([facility])    Essential hypertension    Benign prostatic hyperplasia with lower urinary tract symptoms    Microalbuminuria    Ulcer of right foot with fat layer exposed ([facility])    Ambulatory dysfunction    S/P transmetatarsal amputation of foot, right ([facility])    Sleep disturbance    Anxiety and depression    S/P R ALT flap to right foot    Gait abnormality    Vasomotor rhinitis    Corns and callosities    Tinea unguium    Cellulitis of left lower leg    Puncture wound of left foot with foreign body, subsequent encounter    Non-pressure chronic ulcer of other part of left foot  "with bone involvement without evidence of necrosis (HCC)    Personal history of COVID-19    Bronchitis    S/P amputation of lesser toe, left (HCC)    Ulcer of toe of left foot, limited to breakdown of skin ([facility]CC)    Medication management    Subconjunctival hemorrhage of right eye    Ulcer of heel and midfoot, right, with fat layer exposed ([facility]CC)     Past Medical History:   Diagnosis Date    Arthritis     Atrial fibrillation (HCC)     Diagnosed 11/2018    Benign prostate hyperplasia 04/2002    Cellulitis     right lower leg     Colon polyp 2006    Diabetes mellitus (HCC)     Hearing aid worn     bilat    Hearing loss     90% loss left ear and 40% right ear    History of clubfoot     \"since birth\"    History of pneumonia     History of TIA (transient ischemic attack) 04/25/2017 11/30/18 pt denies    Hyperlipidemia 5/15/2014    Hypertension     Infectious viral hepatitis     \"cant remember type\"    Irregular heart beat     Afib    Neuropathy     both feet    Osteomyelitis (HCC)     right great toe    Pneumonia     Right middle lobe pulmonary nodule 11/6/2018    Seasickness     Teeth missing     Type 2 diabetes mellitus with diabetic neuropathy ([facility]CC) 11/6/2018    Wears glasses     reading    Wound, open     bottom of right foot     Past Surgical History:   Procedure Laterality Date    BUNIONECTOMY Right 12/4/2018    Procedure: 5TH METATARSAL BONE PARTIAL RESECTION, FULL THICKNESS DEBRIDEMENT OF DIABETIC ULCER;  Surgeon: Shala Flynn DPM;  [facility];  Service: Podiatry    CLOSURE DELAYED PRIMARY Left 10/30/2023    Procedure: CLOSURE DELAYED PRIMARY left foot;  Surgeon: Minh Urena DPM;  [facility];  Service: Podiatry    CLUB FOOT RELEASE Bilateral     COLONOSCOPY      COMPLEX WOUND CLOSURE TO EXTREMITY  4/27/2021    Procedure: COMPLEX WOUND CLOSURE TO EXTREMITY: RIGHT FOOT;  Surgeon: Niraj Bojorquez MD;  [facility];  Service: Plastics    EXTERNAL FIXATOR APPLICATION Right " 2/12/2021    Procedure: Application multiplane external fixation;  Surgeon: Harry Zamudio DPM;  [facility];  Service: Podiatry    FOOT HARDWARE REMOVAL Right 2/17/2021    Procedure: REMOVAL EXTERNAL FIXATOR;  Surgeon: Harry Zamudio DPM;  [facility];  Service: Podiatry    FOREIGN BODY REMOVAL Right 4/27/2021    Procedure: REMOVAL FOREIGN BODY EXTREMITY: RIGHT FOOT;  Surgeon: Niraj Bojorquez MD;  [facility];  Service: Plastics    INCISION AND DRAINAGE OF WOUND Right 2/17/2021    Procedure: INCISION AND DRAINAGE (I&D) EXTREMITY;  Surgeon: Harry Zamudio DPM;  [facility];  Service: Podiatry    INCISION AND DRAINAGE OF WOUND Left 10/26/2023    Procedure: INCISION AND DRAINAGE (I&D) EXTREMITY; WASHOUT; REMOVAL OF FOREIGN BODY;  Surgeon: Satnam Ponce DPM;  [facility];  Service: Podiatry    ORIF FOOT FRACTURE Right 3/4/2021    Procedure: VAC PLACEMENT; SCREW FIXATION RIGHT FOOT FUSION;  Surgeon: Harry Zamudio DPM;  [facility];  Service: Podiatry    CT AMPUTATION FOOT TRANSMETARSAL Right 2/22/2021    Procedure: AMPUTATION TRANSMETATARSAL (TMA), REMOVAL NAIL IM T2 ICF HARDWARE;  Surgeon: Harry Zamudio DPM;  [facility];  Service: Podiatry    CT AMPUTATION METATARSAL W/TOE SINGLE Left 11/29/2023    Procedure: LEFT FOURTH RAY RESECTION FOOT;  Surgeon: Harry Zamudio DPM;  [facility];  Service: Podiatry    CT ARTHRD MIDTARSL/TARS MLT/TRANSVRS W/OSTEOT Right 2/12/2021    Procedure: foot ARTHRODESIS/FUSION;  Surgeon: Harry Zamudio DPM;  [facility];  Service: Podiatry    CT DEBRIDEMENT BONE 1ST 20 SQ CM/< Right 12/2/2021    Procedure: DEBRIDEMENT OF TARSAL BONES WITH ANITBIODIC BEADS;  Surgeon: Harry Zamudio DPM;  [facility];  Service: Podiatry    CT LNGTH/SHRT TENDON LEG/ANKLE 1 TENDON SPX Right 2/12/2021    Procedure: LENGTHEN TIBIAL TENDON, TRANS HEEL CORD;  Surgeon: Harry Zamudio DPM;  [facility];  Service: Podiatry    VT MUSC MYOCUTANEOUS/FASCIOCUTANEOUS FLAP TRUNK Right 4/12/2021    Procedure: RECONSTRUCTION MICROSURGICAL W/ FREE FLAP;  Surgeon: Niraj Bojorquez MD;  [facility];  Service: Plastics    VT MUSC MYOCUTANEOUS/FASCIOCUTANEOUS FLAP TRUNK Right 4/12/2021    Procedure: TAKEBACK RECONSTRUCTION MICROSURGICAL W/ FREE FLAP;  Surgeon: Niraj Bojorquez MD;  [facility];  Service: Plastics    VT MUSC MYOCUTANEOUS/FASCIOCUTANEOUS FLAP TRUNK Right 4/13/2021    Procedure: RECONSTRUCTION MICROSURGICAL W/ FREE FLAP, RE EXPLORATION, MICRO VASCULAR REVISION;  Surgeon: Niraj Bojorquez MD;  [facility];  Service: Plastics    VT OSTC PRTL EXOSTC/CONDYLC METAR HEAD Right 12/29/2020    Procedure: EXCISION EXOSTOSIS;  Surgeon: Harry Zamudio DPM;  [facility];  Service: Podiatry    VT SECONDARY CLOSURE SURG WOUND/DEHSN XTNSV/COMP Right 12/29/2020    Procedure: PRIMARY DELAYED CLOSURE;  Surgeon: Harry Zamudio DPM;  [facility];  Service: Podiatry    TOE AMPUTATION Right     partial great toe    TONSILLECTOMY      VAC DRESSING APPLICATION Right 3/1/2021    Procedure: APPLICATION VAC DRESSING EXTREMITY;  Surgeon: Niraj Bojorquez MD;  [facility];  Service: Plastics    VASECTOMY  1992    WOUND DEBRIDEMENT Right 3/1/2021    Procedure: DEBRIDEMENT LOWER EXTREMITY (WASH OUT);  Surgeon: Niraj Bojorquez MD;  [facility];  Service: Plastics    WOUND DEBRIDEMENT Right 4/7/2021    Procedure: DEBRIDEMENT RIGHT FOOT;  Surgeon: Niraj Bojorquez MD;  [facility];  Service: Plastics    WOUND DEBRIDEMENT Right 4/12/2021    Procedure: DEBRIDEMENT LOWER EXTREMITY (WASH OUT);  Surgeon: Niraj Bojorquez MD;  [facility];  Service: Plastics    WOUND DEBRIDEMENT Right 4/27/2021    Procedure: DEBRIDEMENT LOWER EXTREMITY (WASH OUT): RIGHT FOOT;  Surgeon: Niraj Bojorquez MD;   [facility];  Service: Plastics     Family History   Problem Relation Age of Onset    Diabetes Father         mellitus      Social History     Socioeconomic History    Marital status: /Civil Union     Spouse name: None    Number of children: None    Years of education: None    Highest education level: None   Occupational History    None   Tobacco Use    Smoking status: Former     Current packs/day: 0.00     Types: Cigarettes     Quit date: 1988     Years since quittin.4     Passive exposure: Past    Smokeless tobacco: Former    Tobacco comments:     exposure to passive smoke   Vaping Use    Vaping status: Never Used   Substance and Sexual Activity    Alcohol use: Yes     Comment: occasionally    Drug use: Not Currently    Sexual activity: Yes     Partners: Female   Other Topics Concern    None   Social History Narrative    None     Social Drivers of Health     Financial Resource Strain: Not on file   Food Insecurity: No Food Insecurity (2025)    Nursing - Inadequate Food Risk Classification     Worried About Running Out of Food in the Last Year: Never true     Ran Out of Food in the Last Year: Never true     Ran Out of Food in the Last Year: Never true   Transportation Needs: No Transportation Needs (2025)    Nursing - Transportation Risk Classification     Lack of Transportation: Not on file     Lack of Transportation: No   Physical Activity: Not on file   Stress: Not on file   Social Connections: Not on file   Intimate Partner Violence: Unknown (2025)    Nursing IPS     Feels Physically and Emotionally Safe: Not on file     Physically Hurt by Someone: Not on file     Humiliated or Emotionally Abused by Someone: Not on file     Physically Hurt by Someone: No     Hurt or Threatened by Someone: No   Housing Stability: Unknown (2025)    Nursing: Inadequate Housing Risk Classification     Has Housing: Not on file     Worried About Losing Housing: Not on file     Unable to  Get Utilities: Not on file     Unable to Pay for Housing in the Last Year: No     Has Housin        Current Outpatient Medications:     acetaminophen (Mapap Arthritis Pain) 650 mg CR tablet, Take 1 tablet (650 mg total) by mouth 2 (two) times a day (Patient not taking: Reported on 2025), Disp: 60 tablet, Rfl: 1    atorvastatin (LIPITOR) 40 mg tablet, Take 1 tablet (40 mg total) by mouth daily, Disp: 90 tablet, Rfl: 1    clopidogrel (Plavix) 75 mg tablet, Take 1 tablet (75 mg total) by mouth daily, Disp: 90 tablet, Rfl: 1    Continuous Glucose Sensor (FreeStyle Taylor 3 Sensor) MISC, Use 1 each every 14 (fourteen) days, Disp: 1 each, Rfl: 0    diltiazem (CARDIZEM) 120 MG tablet, Take 1 tablet (120 mg total) by mouth every 12 (twelve) hours, Disp: 180 tablet, Rfl: 1    Eliquis 5 MG, Take 1 tablet (5 mg total) by mouth 2 (two) times a day (Patient not taking: Reported on 2025), Disp: 60 tablet, Rfl: 11    escitalopram (LEXAPRO) 20 mg tablet, Take 1 tablet (20 mg total) by mouth daily, Disp: 90 tablet, Rfl: 0    glucose blood (Accu-Chek Guide) test strip, USE DAILY (Patient not taking: Reported on 2025), Disp: 100 strip, Rfl: 2    Insulin Glargine Solostar (Lantus SoloStar) 100 UNIT/ML SOPN, Inject 0.3 mL (30 Units total) under the skin daily at bedtime, Disp: 45 mL, Rfl: 1    insulin lispro (HumaLOG) 100 units/mL injection pen, Inject up to 20 Units under the skin 3 (three) times a day with meals, Disp: 15 mL, Rfl: 5    Insulin Pen Needle (Easy Comfort Pen Needles) 33G X 5 MM MISC, USE AS DIRECTED, Disp: 100 each, Rfl: 4    Lancets (onetouch ultrasoft) lancets, Use as instructed (Patient not taking: Reported on 2025), Disp: 100 each, Rfl: 3    Lancets (onetouch ultrasoft) lancets, Use daily (Patient not taking: Reported on 2025), Disp: 100 each, Rfl: 3    lisinopril (ZESTRIL) 40 mg tablet, Take 1 tablet (40 mg total) by mouth daily, Disp: 90 tablet, Rfl: 1    metFORMIN (GLUCOPHAGE-XR) 750 mg  24 hr tablet, Take 1 tablet (750 mg total) by mouth 2 (two) times a day with meals, Disp: 180 tablet, Rfl: 1    metoprolol succinate (TOPROL-XL) 100 mg 24 hr tablet, Take 1 tablet (100 mg total) by mouth 2 (two) times a day, Disp: 180 tablet, Rfl: 0    mupirocin (BACTROBAN) 2 % ointment, Apply topically 3 (three) times a day (Patient not taking: Reported on 2/28/2025), Disp: 15 g, Rfl: 0    promethazine (PHENERGAN) 12.5 mg/10 mL oral solution, Take 10 mL (12.5 mg total) by mouth every 6 (six) hours as needed (cough), Disp: 118 mL, Rfl: 0    tamsulosin (FLOMAX) 0.4 mg, Take 1 capsule (0.4 mg total) by mouth daily with dinner, Disp: 90 capsule, Rfl: 1  No current facility-administered medications for this visit.    Review of Systems   Constitutional:  Negative for chills and fever.   Respiratory:  Negative for shortness of breath.    Cardiovascular:  Negative for chest pain.   Gastrointestinal:  Negative for nausea and vomiting.   Skin:  Positive for wound.   Neurological:  Positive for numbness. Negative for weakness.       Objective:  /86   Pulse 72   Temp (!) 97.1 °F (36.2 °C) (Tympanic)   Resp 16   Pain Score: 0-No pain     Physical Exam  Vitals and nursing note reviewed.   Constitutional:       General: He is not in acute distress.     Appearance: He is not toxic-appearing or diaphoretic.   Cardiovascular:      Rate and Rhythm: Normal rate and regular rhythm.      Pulses:           Dorsalis pedis pulses are 1+ on the right side and 1+ on the left side.        Posterior tibial pulses are 0 on the right side and 0 on the left side.   Pulmonary:      Effort: Pulmonary effort is normal. No respiratory distress.   Musculoskeletal:         General: Deformity present. No signs of injury.      Right lower leg: Edema present.      Left lower leg: Edema present.      Right foot: No foot drop.      Left foot: No foot drop.      Comments: S/P partial foot amputation right.   Skin:     General: Skin is warm.       Capillary Refill: Capillary refill takes less than 2 seconds.      Coloration: Skin is not cyanotic or mottled.      Findings: Wound present. No abscess.      Nails: There is no clubbing.      Comments: Superficial eschar on left 3rd toe.  No drainage.  No redness.  Wound bed is fibrous right leg.  Right plantar foot ulcer is granular, but has periwound callus and undermining.  No cellulitis.  No deep probing.  No signs of infection.  See wound assessment and photo.   Neurological:      General: No focal deficit present.      Mental Status: He is alert and oriented to person, place, and time.      Cranial Nerves: No cranial nerve deficit.      Sensory: Sensory deficit present.      Motor: No weakness.      Coordination: Coordination normal.   Psychiatric:         Mood and Affect: Mood normal.         Behavior: Behavior normal.         Thought Content: Thought content normal.         Judgment: Judgment normal.                 Wound 12/16/24 Diabetic Ulcer Foot Right;Plantar (Active)   Wound Image Images linked 03/31/25 1024       Wound 03/03/25 Leg Right;Anterior (Active)   Wound Image Images linked 03/31/25 1024             Results from last 6 Months   Lab Units 02/17/25  1118   WOUND CULTURE  2+ Growth of Staphylococcus aureus*         Lab Results   Component Value Date    HGBA1C 7.7 (H) 01/16/2025       Wound Instructions:  Orders Placed This Encounter   Procedures    Debridement Right;Anterior Leg     This order was created via procedure documentation    Splint, Casting, Strapping     This order was created via procedure documentation    Wound Procedure Treatment     This order was created via procedure documentation    Wound cleansing and dressings     Wash your hands with soap and water.  Remove old dressing, discard into plastic bag and place in trash.    Cleanse the wound with gentle unscented soap and water prior to applying a clean dressing.   Do not use tissue or cotton balls. Do not scrub the wound. Pat  dry using gauze.     Shower? Only with protection. Do not get cast wet!!      Right Plantar Foot & Right Leg Wounds  Apply Acticoat 7 followed by calcium alginate to wound.  Cover with 1/2 abd pad each area.  Secure with rolled gauze & tape  Double layer 1/4 inch felt to right heel for offloading.    Spandagrip size F applied under TCC dressings.     Change dressing weekly in wound center with TCC change.     Off-loading Instructions:   Keep weight and pressure off wound at all times.   Wear off-loading device as directed by your physician. Put on immediately when rising in the morning and remove when going to bed.  Wear sugical shoe or open toed shoe. Ensure that there is no rubbing to wound.   Limit your walking     Follow up at the wound center in one week.     Standing Status:   Future     Expiration Date:   4/7/2025    Wound cleansing and dressings Diabetic Ulcer Anterior;Left Toe D3, third     Wash your hands with soap and water.  Remove old dressing, discard into plastic bag and place in trash.    Cleanse the wound with mild soap and water prior to applying a clean dressing.   Do not use tissue or cotton balls. Do not scrub the wound. Pat dry using gauze.      Left 3rd toe  Apply Acticoat 7 followed by calcium alginate to the left 3rd toe wound.    Cover with gauze, secure with tape  Change dressing weekly & as needed for soilage dislodgement.     Standing Status:   Future     Expiration Date:   4/7/2025    Wound Procedure Treatment Diabetic Ulcer Anterior;Left Toe D3, third     This order was created via procedure documentation             Satnam Ponce DPM

## 2025-04-02 ENCOUNTER — TELEPHONE (OUTPATIENT)
Dept: FAMILY MEDICINE CLINIC | Facility: CLINIC | Age: 75
End: 2025-04-02

## 2025-04-07 ENCOUNTER — OFFICE VISIT (OUTPATIENT)
Dept: WOUND CARE | Facility: CLINIC | Age: 75
End: 2025-04-07
Payer: COMMERCIAL

## 2025-04-07 VITALS
DIASTOLIC BLOOD PRESSURE: 82 MMHG | TEMPERATURE: 97.2 F | HEART RATE: 80 BPM | RESPIRATION RATE: 16 BRPM | SYSTOLIC BLOOD PRESSURE: 146 MMHG

## 2025-04-07 DIAGNOSIS — S81.801A TRAUMATIC OPEN WOUND OF RIGHT LOWER LEG, INITIAL ENCOUNTER: ICD-10-CM

## 2025-04-07 DIAGNOSIS — L97.412 ULCER OF HEEL AND MIDFOOT, RIGHT, WITH FAT LAYER EXPOSED (HCC): Primary | ICD-10-CM

## 2025-04-07 DIAGNOSIS — L97.521 ULCER OF TOE OF LEFT FOOT, LIMITED TO BREAKDOWN OF SKIN (HCC): ICD-10-CM

## 2025-04-07 DIAGNOSIS — E11.40 TYPE 2 DIABETES MELLITUS WITH DIABETIC NEUROPATHY, UNSPECIFIED WHETHER LONG TERM INSULIN USE (HCC): ICD-10-CM

## 2025-04-07 PROCEDURE — 99213 OFFICE O/P EST LOW 20 MIN: CPT | Performed by: PODIATRIST

## 2025-04-07 PROCEDURE — 29445 APPL RIGID TOT CNTC LEG CAST: CPT | Performed by: PODIATRIST

## 2025-04-07 RX ORDER — LIDOCAINE 40 MG/G
CREAM TOPICAL ONCE
Status: COMPLETED | OUTPATIENT
Start: 2025-04-07 | End: 2025-04-07

## 2025-04-07 RX ADMIN — LIDOCAINE: 40 CREAM TOPICAL at 10:48

## 2025-04-07 NOTE — PROGRESS NOTES
Patient ID: Won Gaitan Jr. is a 75 y.o. male Date of Birth 1950       Chief Complaint   Patient presents with    Follow Up Wound Care Visit     BLE wounds       Allergies:  Simvastatin and Itraconazole    Assessments:      Diagnosis ICD-10-CM Associated Orders   1. Ulcer of heel and midfoot, right, with fat layer exposed ([facility])  L97.412 Splint, Casting, Strapping     lidocaine (LMX) 4 % cream     Wound cleansing and dressings      2. Ulcer of toe of left foot, limited to breakdown of skin ([facility])  L97.521 lidocaine (LMX) 4 % cream     Wound cleansing and dressings Diabetic Ulcer Anterior;Left Toe D3, third      3. Type 2 diabetes mellitus with diabetic neuropathy, unspecified whether long term insulin use ([facility])  E11.40 Splint, Casting, Strapping      4. Traumatic open wound of right lower leg, initial encounter  S81.801A lidocaine (LMX) 4 % cream     Wound cleansing and dressings           Plan:  Reviewed medical records.  Patient was counseled and educated on the condition and the diagnosis.    2.  The diagnosis, treatment options and prognosis were discussed with the patient.    3.  All wound stable and smaller.  Continue TCC.  TCC applied per protocol.  Stressed on pt compliance about proper off-loading and staying off of his feet.  Discussed risks related to his condition and non-compliance including more amputation / infection.    4.  Surgical shoe on left foot.  Instructed proper elevation of legs.    5.  RA in 1 week.         Imaging: I have personally reviewed pertinent films in PACS  Labs, pathology, and Other Studies: I have personally reviewed pertinent reports.        Splint, Casting, Strapping    Date/Time: 4/7/2025 10:00 AM    Performed by: Satnam Ponce DPM  Authorized by: Satnam Ponce DPM    Verbal consent obtained?: Yes    Risks and benefits: Risks, benefits and alternatives were discussed    Consent given by:  Patient  Time Out:     Time out: Immediately prior to the procedure a time out was  called      Time out performed at:  4/7/2025 10:47 AM  Patient states understanding of procedure being performed: Yes    Patient identity confirmed:  Verbally with patient  Procedure details:     Laterality:  Right    Location:  Foot    Foot:  R foot    Cast type:  Total contact    Splint composition: static      Supplies:  Cotton padding, sling, fiberglass, elastic bandage and skin protective strip  Post-procedure details:     Patient tolerance of procedure:  Tolerated well, no immediate complications         Subjective:     HPI  The patient presents for evaluation and treatment of wounds.  BS under control.  Tolerated TCC well.  No pain in his feet.        The following portions of the patient's history were reviewed and updated as appropriate:   Patient Active Problem List   Diagnosis    Hyperlipidemia    Type 2 diabetes mellitus with diabetic neuropathy ([facility])    Atrial fibrillation ([facility])    Right middle lobe pulmonary nodule    Type 2 diabetes mellitus with other skin ulcer (CODE) ([facility])    Essential hypertension    Benign prostatic hyperplasia with lower urinary tract symptoms    Microalbuminuria    Ulcer of right foot with fat layer exposed ([facility])    Ambulatory dysfunction    S/P transmetatarsal amputation of foot, right ([facility])    Sleep disturbance    Anxiety and depression    S/P R ALT flap to right foot    Gait abnormality    Vasomotor rhinitis    Corns and callosities    Tinea unguium    Cellulitis of left lower leg    Puncture wound of left foot with foreign body, subsequent encounter    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 ([facility])    Personal history of COVID-19    Bronchitis    S/P amputation of lesser toe, left ([facility])    Ulcer of toe of left foot, limited to breakdown of skin ([facility])    Medication management    Subconjunctival hemorrhage of right eye    Ulcer of heel and midfoot, right, with fat layer exposed ([facility])     Past Medical History:  "  Diagnosis Date    Arthritis     Atrial fibrillation (HCC)     Diagnosed 11/2018    Benign prostate hyperplasia 04/2002    Cellulitis     right lower leg     Colon polyp 2006    Diabetes mellitus (HCC)     Hearing aid worn     bilat    Hearing loss     90% loss left ear and 40% right ear    History of clubfoot     \"since birth\"    History of pneumonia     History of TIA (transient ischemic attack) 04/25/2017 11/30/18 pt denies    Hyperlipidemia 5/15/2014    Hypertension     Infectious viral hepatitis     \"cant remember type\"    Irregular heart beat     Afib    Neuropathy     both feet    Osteomyelitis (HCC)     right great toe    Pneumonia     Right middle lobe pulmonary nodule 11/6/2018    Seasickness     Teeth missing     Type 2 diabetes mellitus with diabetic neuropathy (HCC) 11/6/2018    Wears glasses     reading    Wound, open     bottom of right foot     Past Surgical History:   Procedure Laterality Date    BUNIONECTOMY Right 12/4/2018    Procedure: 5TH METATARSAL BONE PARTIAL RESECTION, FULL THICKNESS DEBRIDEMENT OF DIABETIC ULCER;  Surgeon: Shala Flynn DPM;  [facility];  Service: Podiatry    CLOSURE DELAYED PRIMARY Left 10/30/2023    Procedure: CLOSURE DELAYED PRIMARY left foot;  Surgeon: Minh Urena DPM;  [facility];  Service: Podiatry    CLUB FOOT RELEASE Bilateral     COLONOSCOPY      COMPLEX WOUND CLOSURE TO EXTREMITY  4/27/2021    Procedure: COMPLEX WOUND CLOSURE TO EXTREMITY: RIGHT FOOT;  Surgeon: Niraj Bojorquez MD;  [facility];  Service: Plastics    EXTERNAL FIXATOR APPLICATION Right 2/12/2021    Procedure: Application multiplane external fixation;  Surgeon: Harry Zamudio DPM;  [facility];  Service: Podiatry    FOOT HARDWARE REMOVAL Right 2/17/2021    Procedure: REMOVAL EXTERNAL FIXATOR;  Surgeon: Harry Zamudio DPM;  [facility];  Service: Podiatry    FOREIGN BODY REMOVAL Right 4/27/2021    Procedure: REMOVAL FOREIGN BODY " EXTREMITY: RIGHT FOOT;  Surgeon: Niraj Bojorquez MD;  [facility];  Service: Plastics    INCISION AND DRAINAGE OF WOUND Right 2/17/2021    Procedure: INCISION AND DRAINAGE (I&D) EXTREMITY;  Surgeon: Harry Zamudio DPM;  [facility];  Service: Podiatry    INCISION AND DRAINAGE OF WOUND Left 10/26/2023    Procedure: INCISION AND DRAINAGE (I&D) EXTREMITY; WASHOUT; REMOVAL OF FOREIGN BODY;  Surgeon: Satnam Ponce DPM;  [facility];  Service: Podiatry    ORIF FOOT FRACTURE Right 3/4/2021    Procedure: VAC PLACEMENT; SCREW FIXATION RIGHT FOOT FUSION;  Surgeon: Harry Zamudio DPM;  [facility];  Service: Podiatry    WI AMPUTATION FOOT TRANSMETARSAL Right 2/22/2021    Procedure: AMPUTATION TRANSMETATARSAL (TMA), REMOVAL NAIL IM T2 ICF HARDWARE;  Surgeon: Harry Zamudio DPM;  [facility];  Service: Podiatry    WI AMPUTATION METATARSAL W/TOE SINGLE Left 11/29/2023    Procedure: LEFT FOURTH RAY RESECTION FOOT;  Surgeon: Harry Zamudio DPM;  [facility];  Service: Podiatry    WI ARTHRD MIDTARSL/TARS MLT/TRANSVRS W/OSTEOT Right 2/12/2021    Procedure: foot ARTHRODESIS/FUSION;  Surgeon: Harry Zamudio DPM;  [facility];  Service: Podiatry    WI DEBRIDEMENT BONE 1ST 20 SQ CM/< Right 12/2/2021    Procedure: DEBRIDEMENT OF TARSAL BONES WITH ANITBIODIC BEADS;  Surgeon: Harry Zamudio DPM;  [facility];  Service: Podiatry    WI LNGTH/SHRT TENDON LEG/ANKLE 1 TENDON SPX Right 2/12/2021    Procedure: LENGTHEN TIBIAL TENDON, TRANS HEEL CORD;  Surgeon: Harry Zamudio DPM;  [facility];  Service: Podiatry    WI MUSC MYOCUTANEOUS/FASCIOCUTANEOUS FLAP TRUNK Right 4/12/2021    Procedure: RECONSTRUCTION MICROSURGICAL W/ FREE FLAP;  Surgeon: Niraj Bojorquez MD;  [facility];  Service: Plastics    WI MUSC MYOCUTANEOUS/FASCIOCUTANEOUS FLAP TRUNK Right 4/12/2021    Procedure: TAKEBACK RECONSTRUCTION MICROSURGICAL W/ FREE FLAP;   Surgeon: Niraj Bojorquez MD;  [facility];  Service: Plastics    WA MUSC MYOCUTANEOUS/FASCIOCUTANEOUS FLAP TRUNK Right 4/13/2021    Procedure: RECONSTRUCTION MICROSURGICAL W/ FREE FLAP, RE EXPLORATION, MICRO VASCULAR REVISION;  Surgeon: Niraj Bojorquez MD;  [facility];  Service: Plastics    WA OSTC PRTL EXOSTC/CONDYLC METAR HEAD Right 12/29/2020    Procedure: EXCISION EXOSTOSIS;  Surgeon: Harry Zamudio DPM;  [facility];  Service: Podiatry    WA SECONDARY CLOSURE SURG WOUND/DEHSN XTNSV/COMP Right 12/29/2020    Procedure: PRIMARY DELAYED CLOSURE;  Surgeon: Harry Zamudio DPM;  [facility];  Service: Podiatry    TOE AMPUTATION Right     partial great toe    TONSILLECTOMY      VAC DRESSING APPLICATION Right 3/1/2021    Procedure: APPLICATION VAC DRESSING EXTREMITY;  Surgeon: Niraj Bojorquez MD;  [facility];  Service: Plastics    VASECTOMY  1992    WOUND DEBRIDEMENT Right 3/1/2021    Procedure: DEBRIDEMENT LOWER EXTREMITY (WASH OUT);  Surgeon: Niraj Bojorquez MD;  [facility];  Service: Plastics    WOUND DEBRIDEMENT Right 4/7/2021    Procedure: DEBRIDEMENT RIGHT FOOT;  Surgeon: Niraj Bojorquez MD;  [facility];  Service: Plastics    WOUND DEBRIDEMENT Right 4/12/2021    Procedure: DEBRIDEMENT LOWER EXTREMITY (WASH OUT);  Surgeon: Niraj Bojorquez MD;  [facility];  Service: Plastics    WOUND DEBRIDEMENT Right 4/27/2021    Procedure: DEBRIDEMENT LOWER EXTREMITY (WASH OUT): RIGHT FOOT;  Surgeon: Niraj Bojorquez MD;  [facility];  Service: Plastics     Family History   Problem Relation Age of Onset    Diabetes Father         mellitus      Social History     Socioeconomic History    Marital status: /Civil Union     Spouse name: None    Number of children: None    Years of education: None    Highest education level: None   Occupational History    None   Tobacco Use     Smoking status: Former     Current packs/day: 0.00     Types: Cigarettes     Quit date: 1988     Years since quittin.4     Passive exposure: Past    Smokeless tobacco: Former    Tobacco comments:     exposure to passive smoke   Vaping Use    Vaping status: Never Used   Substance and Sexual Activity    Alcohol use: Yes     Comment: occasionally    Drug use: Not Currently    Sexual activity: Yes     Partners: Female   Other Topics Concern    None   Social History Narrative    None     Social Drivers of Health     Financial Resource Strain: Not on file   Food Insecurity: No Food Insecurity (2025)    Nursing - Inadequate Food Risk Classification     Worried About Running Out of Food in the Last Year: Never true     Ran Out of Food in the Last Year: Never true     Ran Out of Food in the Last Year: Never true   Transportation Needs: No Transportation Needs (2025)    Nursing - Transportation Risk Classification     Lack of Transportation: Not on file     Lack of Transportation: No   Physical Activity: Not on file   Stress: Not on file   Social Connections: Not on file   Intimate Partner Violence: Unknown (2025)    Nursing IPS     Feels Physically and Emotionally Safe: Not on file     Physically Hurt by Someone: Not on file     Humiliated or Emotionally Abused by Someone: Not on file     Physically Hurt by Someone: No     Hurt or Threatened by Someone: No   Housing Stability: Unknown (2025)    Nursing: Inadequate Housing Risk Classification     Has Housing: Not on file     Worried About Losing Housing: Not on file     Unable to Get Utilities: Not on file     Unable to Pay for Housing in the Last Year: No     Has Housin        Current Outpatient Medications:     acetaminophen (Mapap Arthritis Pain) 650 mg CR tablet, Take 1 tablet (650 mg total) by mouth 2 (two) times a day (Patient not taking: Reported on 2025), Disp: 60 tablet, Rfl: 1    atorvastatin (LIPITOR) 40 mg tablet, Take 1  tablet (40 mg total) by mouth daily, Disp: 90 tablet, Rfl: 1    clopidogrel (Plavix) 75 mg tablet, Take 1 tablet (75 mg total) by mouth daily, Disp: 90 tablet, Rfl: 1    Continuous Glucose Sensor (FreeStyle Taylor 3 Sensor) MISC, Use 1 each every 14 (fourteen) days, Disp: 1 each, Rfl: 0    diltiazem (CARDIZEM) 120 MG tablet, Take 1 tablet (120 mg total) by mouth every 12 (twelve) hours, Disp: 180 tablet, Rfl: 1    Eliquis 5 MG, Take 1 tablet (5 mg total) by mouth 2 (two) times a day (Patient not taking: Reported on 2/28/2025), Disp: 60 tablet, Rfl: 11    escitalopram (LEXAPRO) 20 mg tablet, Take 1 tablet (20 mg total) by mouth daily, Disp: 90 tablet, Rfl: 0    glucose blood (Accu-Chek Guide) test strip, USE DAILY (Patient not taking: Reported on 2/28/2025), Disp: 100 strip, Rfl: 2    Insulin Glargine Solostar (Lantus SoloStar) 100 UNIT/ML SOPN, Inject 0.3 mL (30 Units total) under the skin daily at bedtime, Disp: 45 mL, Rfl: 1    insulin lispro (HumaLOG) 100 units/mL injection pen, Inject up to 20 Units under the skin 3 (three) times a day with meals, Disp: 15 mL, Rfl: 5    Insulin Pen Needle (Easy Comfort Pen Needles) 33G X 5 MM MISC, USE AS DIRECTED, Disp: 100 each, Rfl: 4    Lancets (onetouch ultrasoft) lancets, Use as instructed (Patient not taking: Reported on 2/17/2025), Disp: 100 each, Rfl: 3    Lancets (onetouch ultrasoft) lancets, Use daily (Patient not taking: Reported on 2/17/2025), Disp: 100 each, Rfl: 3    lisinopril (ZESTRIL) 40 mg tablet, Take 1 tablet (40 mg total) by mouth daily, Disp: 90 tablet, Rfl: 1    metFORMIN (GLUCOPHAGE-XR) 750 mg 24 hr tablet, Take 1 tablet (750 mg total) by mouth 2 (two) times a day with meals, Disp: 180 tablet, Rfl: 1    metoprolol succinate (TOPROL-XL) 100 mg 24 hr tablet, Take 1 tablet (100 mg total) by mouth 2 (two) times a day, Disp: 180 tablet, Rfl: 0    mupirocin (BACTROBAN) 2 % ointment, Apply topically 3 (three) times a day (Patient not taking: Reported on  2/28/2025), Disp: 15 g, Rfl: 0    promethazine (PHENERGAN) 12.5 mg/10 mL oral solution, Take 10 mL (12.5 mg total) by mouth every 6 (six) hours as needed (cough), Disp: 118 mL, Rfl: 0    tamsulosin (FLOMAX) 0.4 mg, Take 1 capsule (0.4 mg total) by mouth daily with dinner, Disp: 90 capsule, Rfl: 1  No current facility-administered medications for this visit.    Review of Systems   Constitutional:  Negative for chills and fever.   Respiratory:  Negative for shortness of breath.    Cardiovascular:  Negative for chest pain.   Gastrointestinal:  Negative for nausea and vomiting.   Skin:  Positive for wound.   Neurological:  Positive for numbness. Negative for weakness.       Objective:  /82   Pulse 80   Temp (!) 97.2 °F (36.2 °C)   Resp 16   Pain Score: 0-No pain     Physical Exam  Vitals and nursing note reviewed.   Constitutional:       General: He is not in acute distress.     Appearance: He is not toxic-appearing or diaphoretic.   Cardiovascular:      Rate and Rhythm: Normal rate and regular rhythm.      Pulses:           Dorsalis pedis pulses are 1+ on the right side and 1+ on the left side.        Posterior tibial pulses are 0 on the right side and 0 on the left side.   Pulmonary:      Effort: Pulmonary effort is normal. No respiratory distress.   Musculoskeletal:         General: Deformity present. No signs of injury.      Right lower leg: Edema present.      Left lower leg: Edema present.      Right foot: No foot drop.      Left foot: No foot drop.      Comments: S/P partial foot amputation right.   Skin:     General: Skin is warm.      Capillary Refill: Capillary refill takes less than 2 seconds.      Coloration: Skin is not cyanotic or mottled.      Findings: Wound present. No abscess.      Nails: There is no clubbing.      Comments: Granular wound on left 3rd toe.  No redness.  Wound bed is granular in right leg and plantar foot.  Wounds are smaller.  No cellulitis.  No deep probing.  No signs of  infection.  See wound assessment and photo.   Neurological:      General: No focal deficit present.      Mental Status: He is alert and oriented to person, place, and time.      Cranial Nerves: No cranial nerve deficit.      Sensory: Sensory deficit present.      Motor: No weakness.      Coordination: Coordination normal.   Psychiatric:         Mood and Affect: Mood normal.         Behavior: Behavior normal.         Thought Content: Thought content normal.         Judgment: Judgment normal.           Wound 12/16/24 Diabetic Ulcer Foot Right;Plantar (Active)   Wound Image   04/07/25 1022   Enter Parks score: Parks Grade 1: Partial or full-thickness ulcer (superficial) 04/07/25 1027   Wound Description Pink;Pale;Epithelialization 04/07/25 1027   Non-staged Wound Description Full thickness 04/07/25 1027   Wound Length (cm) 0.5 cm 04/07/25 1027   Wound Width (cm) 0.8 cm 04/07/25 1027   Wound Depth (cm) 0.3 cm 04/07/25 1027   Wound Surface Area (cm^2) 0.4 cm^2 04/07/25 1027   Wound Volume (cm^3) 0.12 cm^3 04/07/25 1027   Calculated Wound Volume (cm^3) 0.12 cm^3 04/07/25 1027   Change in Wound Size % 45.45 04/07/25 1027   Number of underminings 1 02/17/25 0954   Undermining 1 0 04/07/25 1027   Undermining 1 is depth extending from resolved 04/07/25 1027   Drainage Amount Small 04/07/25 1027   Drainage Description Tan 04/07/25 1027   Lavern-wound Assessment Intact;Callus 04/07/25 1027   Wound packed? No 04/07/25 1027   Dressing Status Removed 03/24/25 0939       Wound 12/30/24 Diabetic Ulcer Toe D3, third Anterior;Left (Active)   Wound Image   04/07/25 1023   Enter Parks score: Parks Grade 1: Partial or full-thickness ulcer (superficial) 04/07/25 1029   Wound Description Pink;Epithelialization 04/07/25 1029   Non-staged Wound Description Full thickness 04/07/25 1029   Wound Length (cm) 0.3 cm 04/07/25 1029   Wound Width (cm) 0.3 cm 04/07/25 1029   Wound Depth (cm) 0.1 cm 04/07/25 1029   Wound Surface Area (cm^2) 0.09  "cm^2 04/07/25 1029   Wound Volume (cm^3) 0.009 cm^3 04/07/25 1029   Calculated Wound Volume (cm^3) 0.01 cm^3 04/07/25 1029   Change in Wound Size % 83.33 04/07/25 1029   Number of underminings 1 02/17/25 0955   Undermining 1 0 03/10/25 1053   Undermining 1 is depth extending from resolved 03/10/25 1053   Drainage Amount Small 04/07/25 1029   Drainage Description Tan 04/07/25 1029   Lavern-wound Assessment Dry;Pink 03/31/25 0937   Wound packed? No 04/07/25 1029   Dressing Status Removed 03/31/25 0937       Wound 03/03/25 Leg Right;Anterior (Active)   Wound Image   04/07/25 1022   Wound Description Pink;Yellow 04/07/25 1026   Non-staged Wound Description Full thickness 04/07/25 1026   Wound Length (cm) 1.6 cm 04/07/25 1026   Wound Width (cm) 1 cm 03/31/25 0937   Wound Depth (cm) 0.1 cm 04/07/25 1026   Wound Surface Area (cm^2) 1.7 cm^2 03/31/25 0937   Wound Volume (cm^3) 0.17 cm^3 03/31/25 0937   Calculated Wound Volume (cm^3) 0.17 cm^3 03/31/25 0937   Change in Wound Size % 69.09 03/31/25 0937   Drainage Amount Small 04/07/25 1026   Drainage Description Serosanguineous 04/07/25 1026   Lavern-wound Assessment Intact;Edema;Pink 04/07/25 1026   Wound packed? No 04/07/25 1026   Dressing Status Removed 03/31/25 0937                                     Results from last 6 Months   Lab Units 02/17/25  1118   WOUND CULTURE  2+ Growth of Staphylococcus aureus*         Lab Results   Component Value Date    HGBA1C 7.7 (H) 01/16/2025       Wound Instructions:  Orders Placed This Encounter   Procedures    Splint, Casting, Strapping     This order was created via procedure documentation    Wound cleansing and dressings     Shower, yes. Only with a cast cover. Do not get cast wet.       Right Plantar Foot & Right Leg Wound:  Apply skin prep to skin under right foot wound.  Apply double 1/4\" offloading felt pad.  Apply Acticoat 7 followed by calcium alginate to the wounds.  Cover with 1/2 ABD to each wound.  Secure with rolled gauze & " tape  TCC applied by Dr. Ponce.  Change weekly at the wound center.       Do not walk without the cast boot.   Do not put any objects into the cast to scratch.    If the cast cracks, or you develop severe pain, contact the wound center or go to the nearest ER. (If the wound center is closed)          Follow up at the wound center in one week.     Standing Status:   Future     Expiration Date:   4/14/2025    Wound cleansing and dressings Diabetic Ulcer Anterior;Left Toe D3, third     Wash your hands with soap and water.  Remove old dressing, discard into plastic bag and place in trash.    Cleanse the wound with mild soap and water prior to applying a clean dressing.   Do not use tissue or cotton balls. Do not scrub the wound. Pat dry using gauze.    Shower, no. Do note get the dressing wet.         Left 3rd toe:  Apply Acticoat 3 to the wound.  Cover with alginate and tape.  Secure with tape  Change dressing three times a week.       Wear surgical shoe whenever ambulating.         Follow up at the wound center in one week.     Standing Status:   Future     Expiration Date:   4/14/2025             Satnam Ponce DPM

## 2025-04-14 ENCOUNTER — OFFICE VISIT (OUTPATIENT)
Dept: WOUND CARE | Facility: CLINIC | Age: 75
End: 2025-04-14
Payer: COMMERCIAL

## 2025-04-14 VITALS
DIASTOLIC BLOOD PRESSURE: 90 MMHG | HEART RATE: 56 BPM | RESPIRATION RATE: 18 BRPM | SYSTOLIC BLOOD PRESSURE: 160 MMHG | TEMPERATURE: 97.9 F

## 2025-04-14 DIAGNOSIS — E11.40 TYPE 2 DIABETES MELLITUS WITH DIABETIC NEUROPATHY, UNSPECIFIED WHETHER LONG TERM INSULIN USE (HCC): ICD-10-CM

## 2025-04-14 DIAGNOSIS — L97.521 ULCER OF TOE OF LEFT FOOT, LIMITED TO BREAKDOWN OF SKIN (HCC): ICD-10-CM

## 2025-04-14 DIAGNOSIS — L97.412 ULCER OF HEEL AND MIDFOOT, RIGHT, WITH FAT LAYER EXPOSED (HCC): Primary | ICD-10-CM

## 2025-04-14 PROCEDURE — 29445 APPL RIGID TOT CNTC LEG CAST: CPT | Performed by: PODIATRIST

## 2025-04-14 RX ORDER — LIDOCAINE 40 MG/G
CREAM TOPICAL ONCE
Status: COMPLETED | OUTPATIENT
Start: 2025-04-14 | End: 2025-04-14

## 2025-04-14 RX ADMIN — LIDOCAINE 1 APPLICATION: 40 CREAM TOPICAL at 10:36

## 2025-04-14 NOTE — PROGRESS NOTES
Wound Procedure Treatment Diabetic Ulcer Anterior;Left Toe D3, third    Performed by: Lanette Fualkner RN  Authorized by: Satnam Ponce DPM  Associated wounds:   Wound 12/30/24 Diabetic Ulcer Toe D3, third Anterior;Left    Wound cleansed with:  NSS   Applied primary dressing:  Acticoat and Calcium alginate   Applied secondary dressing:  Gauze   Dressing secured with:  Kacy, Tape and Surgilast   Offloading device appllied:  Surgical shoe   Comments:  Acticoat 3   Wound Procedure Treatment    Performed by: Lanette Faulkner RN  Authorized by: Satnam Ponce DPM  Associated wounds:   Wound 12/16/24 Diabetic Ulcer Foot Right;Plantar  Wound 03/03/25 Leg Right;Anterior    Wound cleansed with:  Soap and water and NSS   Applied primary dressing:  Acticoat and Calcium alginate   Applied secondary dressing:  ABD   Dressing secured with:  Kacy and Tape   Offloading device appllied:  Felt padding 1/4 inch and TCC

## 2025-04-14 NOTE — PROGRESS NOTES
Patient ID: Won Gaitan Jr. is a 75 y.o. male Date of Birth 1950       Chief Complaint   Patient presents with    Follow Up Wound Care Visit       Allergies:  Simvastatin and Itraconazole    Assessments:      Diagnosis ICD-10-CM Associated Orders   1. Ulcer of heel and midfoot, right, with fat layer exposed ([facility])  L97.412 Wound cleansing and dressings     Splint, Casting, Strapping     lidocaine (LMX) 4 % cream     Wound Procedure Treatment      2. Ulcer of toe of left foot, limited to breakdown of skin ([facility])  L97.521 Wound cleansing and dressings Diabetic Ulcer Anterior;Left Toe D3, third     lidocaine (LMX) 4 % cream     XR toe left third min 2 views     Wound Procedure Treatment Diabetic Ulcer Anterior;Left Toe D3, third      3. Type 2 diabetes mellitus with diabetic neuropathy, unspecified whether long term insulin use ([facility])  E11.40 Splint, Casting, Strapping           Plan:  Reviewed medical records.  Patient was counseled and educated on the condition and the diagnosis.    2.  The diagnosis, treatment options and prognosis were discussed with the patient.    3.   Right foot and leg wounds are stable and healing.  Continue TCC.  TCC applied per protocol.  Noticed strap from surgical shoe rubs on left 3rd toe.  It was adjusted and instructed him to pay more attention for any pressure on his wound.  Stressed on pt compliance about proper off-loading and staying off of his feet.    4. Sent him repeat X-ray of left 3rd toe.  5. RA in 1 week.         Imaging: I have personally reviewed pertinent films in PACS  Labs, pathology, and Other Studies: I have personally reviewed pertinent reports.        Splint, Casting, Strapping    Date/Time: 4/14/2025 10:00 AM    Performed by: Satnam Ponce DPM  Authorized by: Satnam Ponce DPM    Verbal consent obtained?: Yes    Risks and benefits: Risks, benefits and alternatives were discussed    Consent given by:  Patient  Time Out:     Time out: Immediately prior to the  procedure a time out was called      Time out performed at:  4/14/2025 11:16 AM  Patient states understanding of procedure being performed: Yes    Patient identity confirmed:  Verbally with patient  Procedure details:     Laterality:  Right    Location:  Foot    Foot:  R foot    Cast type:  Total contact    Splint composition: static      Supplies:  Cotton padding, sling, fiberglass, elastic bandage and skin protective strip  Post-procedure details:     Patient tolerance of procedure:  Tolerated well, no immediate complications         Subjective:     HPI  The patient presents for evaluation and treatment of wounds.  BS under control.  Tolerated TCC well.  No pain in his feet.        The following portions of the patient's history were reviewed and updated as appropriate:   Patient Active Problem List   Diagnosis    Hyperlipidemia    Type 2 diabetes mellitus with diabetic neuropathy ([facility])    Atrial fibrillation ([facility])    Right middle lobe pulmonary nodule    Type 2 diabetes mellitus with other skin ulcer (CODE) ([facility])    Essential hypertension    Benign prostatic hyperplasia with lower urinary tract symptoms    Microalbuminuria    Ulcer of right foot with fat layer exposed ([facility])    Ambulatory dysfunction    S/P transmetatarsal amputation of foot, right ([facility])    Sleep disturbance    Anxiety and depression    S/P R ALT flap to right foot    Gait abnormality    Vasomotor rhinitis    Corns and callosities    Tinea unguium    Cellulitis of left lower leg    Puncture wound of left foot with foreign body, subsequent encounter    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 ([facility])    Personal history of COVID-19    Bronchitis    S/P amputation of lesser toe, left ([facility])    Ulcer of toe of left foot, limited to breakdown of skin ([facility])    Medication management    Subconjunctival hemorrhage of right eye    Ulcer of heel and midfoot, right, with fat layer exposed ([facility])  "    Past Medical History:   Diagnosis Date    Arthritis     Atrial fibrillation (HCC)     Diagnosed 11/2018    Benign prostate hyperplasia 04/2002    Cellulitis     right lower leg     Colon polyp 2006    Diabetes mellitus (HCC)     Hearing aid worn     bilat    Hearing loss     90% loss left ear and 40% right ear    History of clubfoot     \"since birth\"    History of pneumonia     History of TIA (transient ischemic attack) 04/25/2017 11/30/18 pt denies    Hyperlipidemia 5/15/2014    Hypertension     Infectious viral hepatitis     \"cant remember type\"    Irregular heart beat     Afib    Neuropathy     both feet    Osteomyelitis (HCC)     right great toe    Pneumonia     Right middle lobe pulmonary nodule 11/6/2018    Seasickness     Teeth missing     Type 2 diabetes mellitus with diabetic neuropathy (HCC) 11/6/2018    Wears glasses     reading    Wound, open     bottom of right foot     Past Surgical History:   Procedure Laterality Date    BUNIONECTOMY Right 12/4/2018    Procedure: 5TH METATARSAL BONE PARTIAL RESECTION, FULL THICKNESS DEBRIDEMENT OF DIABETIC ULCER;  Surgeon: Shala Flynn DPM;  [facility];  Service: Podiatry    CLOSURE DELAYED PRIMARY Left 10/30/2023    Procedure: CLOSURE DELAYED PRIMARY left foot;  Surgeon: Minh Urena DPM;  [facility];  Service: Podiatry    CLUB FOOT RELEASE Bilateral     COLONOSCOPY      COMPLEX WOUND CLOSURE TO EXTREMITY  4/27/2021    Procedure: COMPLEX WOUND CLOSURE TO EXTREMITY: RIGHT FOOT;  Surgeon: Niraj Bojorquez MD;  [facility];  Service: Plastics    EXTERNAL FIXATOR APPLICATION Right 2/12/2021    Procedure: Application multiplane external fixation;  Surgeon: Harry Zamudio DPM;  [facility];  Service: Podiatry    FOOT HARDWARE REMOVAL Right 2/17/2021    Procedure: REMOVAL EXTERNAL FIXATOR;  Surgeon: Harry Zamudio DPM;  [facility];  Service: Podiatry    FOREIGN BODY REMOVAL Right 4/27/2021    Procedure: " REMOVAL FOREIGN BODY EXTREMITY: RIGHT FOOT;  Surgeon: Niraj Bojorquez MD;  [facility];  Service: Plastics    INCISION AND DRAINAGE OF WOUND Right 2/17/2021    Procedure: INCISION AND DRAINAGE (I&D) EXTREMITY;  Surgeon: Harry Zamudio DPM;  [facility];  Service: Podiatry    INCISION AND DRAINAGE OF WOUND Left 10/26/2023    Procedure: INCISION AND DRAINAGE (I&D) EXTREMITY; WASHOUT; REMOVAL OF FOREIGN BODY;  Surgeon: Satnam Ponce DPM;  [facility];  Service: Podiatry    ORIF FOOT FRACTURE Right 3/4/2021    Procedure: VAC PLACEMENT; SCREW FIXATION RIGHT FOOT FUSION;  Surgeon: Harry Zamudio DPM;  [facility];  Service: Podiatry    IL AMPUTATION FOOT TRANSMETARSAL Right 2/22/2021    Procedure: AMPUTATION TRANSMETATARSAL (TMA), REMOVAL NAIL IM T2 ICF HARDWARE;  Surgeon: Harry Zamudio DPM;  [facility];  Service: Podiatry    IL AMPUTATION METATARSAL W/TOE SINGLE Left 11/29/2023    Procedure: LEFT FOURTH RAY RESECTION FOOT;  Surgeon: Harry Zamudio DPM;  [facility];  Service: Podiatry    IL ARTHRD MIDTARSL/TARS MLT/TRANSVRS W/OSTEOT Right 2/12/2021    Procedure: foot ARTHRODESIS/FUSION;  Surgeon: Harry Zamudio DPM;  [facility];  Service: Podiatry    IL DEBRIDEMENT BONE 1ST 20 SQ CM/< Right 12/2/2021    Procedure: DEBRIDEMENT OF TARSAL BONES WITH ANITBIODIC BEADS;  Surgeon: Harry Zamudio DPM;  [facility];  Service: Podiatry    IL LNGTH/SHRT TENDON LEG/ANKLE 1 TENDON SPX Right 2/12/2021    Procedure: LENGTHEN TIBIAL TENDON, TRANS HEEL CORD;  Surgeon: Harry Zamudio DPM;  [facility];  Service: Podiatry    IL MUSC MYOCUTANEOUS/FASCIOCUTANEOUS FLAP TRUNK Right 4/12/2021    Procedure: RECONSTRUCTION MICROSURGICAL W/ FREE FLAP;  Surgeon: Niraj Bojorquez MD;  [facility];  Service: Plastics    IL MUSC MYOCUTANEOUS/FASCIOCUTANEOUS FLAP TRUNK Right 4/12/2021    Procedure: TAKEBACK RECONSTRUCTION  MICROSURGICAL W/ FREE FLAP;  Surgeon: Niraj Bojorquez MD;  [facility];  Service: Plastics    MI MUSC MYOCUTANEOUS/FASCIOCUTANEOUS FLAP TRUNK Right 4/13/2021    Procedure: RECONSTRUCTION MICROSURGICAL W/ FREE FLAP, RE EXPLORATION, MICRO VASCULAR REVISION;  Surgeon: Niraj Bojorquez MD;  [facility];  Service: Plastics    MI OSTC PRTL EXOSTC/CONDYLC METAR HEAD Right 12/29/2020    Procedure: EXCISION EXOSTOSIS;  Surgeon: Harry Zamudio DPM;  [facility];  Service: Podiatry    MI SECONDARY CLOSURE SURG WOUND/DEHSN XTNSV/COMP Right 12/29/2020    Procedure: PRIMARY DELAYED CLOSURE;  Surgeon: Harry Zamudio DPM;  [facility];  Service: Podiatry    TOE AMPUTATION Right     partial great toe    TONSILLECTOMY      VAC DRESSING APPLICATION Right 3/1/2021    Procedure: APPLICATION VAC DRESSING EXTREMITY;  Surgeon: Niraj Bojorquez MD;  [facility];  Service: Plastics    VASECTOMY  1992    WOUND DEBRIDEMENT Right 3/1/2021    Procedure: DEBRIDEMENT LOWER EXTREMITY (WASH OUT);  Surgeon: Niraj Bojorquez MD;  [facility];  Service: Plastics    WOUND DEBRIDEMENT Right 4/7/2021    Procedure: DEBRIDEMENT RIGHT FOOT;  Surgeon: Niraj Bojorquez MD;  [facility];  Service: Plastics    WOUND DEBRIDEMENT Right 4/12/2021    Procedure: DEBRIDEMENT LOWER EXTREMITY (WASH OUT);  Surgeon: Niraj Bojorquez MD;  [facility];  Service: Plastics    WOUND DEBRIDEMENT Right 4/27/2021    Procedure: DEBRIDEMENT LOWER EXTREMITY (WASH OUT): RIGHT FOOT;  Surgeon: Niraj Bojorquez MD;  [facility];  Service: Plastics     Family History   Problem Relation Age of Onset    Diabetes Father         mellitus      Social History     Socioeconomic History    Marital status: /Civil Union     Spouse name: None    Number of children: None    Years of education: None    Highest education level: None   Occupational History     None   Tobacco Use    Smoking status: Former     Current packs/day: 0.00     Types: Cigarettes     Quit date: 1988     Years since quittin.4     Passive exposure: Past    Smokeless tobacco: Former    Tobacco comments:     exposure to passive smoke   Vaping Use    Vaping status: Never Used   Substance and Sexual Activity    Alcohol use: Yes     Comment: occasionally    Drug use: Not Currently    Sexual activity: Yes     Partners: Female   Other Topics Concern    None   Social History Narrative    None     Social Drivers of Health     Financial Resource Strain: Not on file   Food Insecurity: No Food Insecurity (2025)    Nursing - Inadequate Food Risk Classification     Worried About Running Out of Food in the Last Year: Never true     Ran Out of Food in the Last Year: Never true     Ran Out of Food in the Last Year: Never true   Transportation Needs: No Transportation Needs (2025)    Nursing - Transportation Risk Classification     Lack of Transportation: Not on file     Lack of Transportation: No   Physical Activity: Not on file   Stress: Not on file   Social Connections: Not on file   Intimate Partner Violence: Unknown (2025)    Nursing IPS     Feels Physically and Emotionally Safe: Not on file     Physically Hurt by Someone: Not on file     Humiliated or Emotionally Abused by Someone: Not on file     Physically Hurt by Someone: No     Hurt or Threatened by Someone: No   Housing Stability: Unknown (2025)    Nursing: Inadequate Housing Risk Classification     Has Housing: Not on file     Worried About Losing Housing: Not on file     Unable to Get Utilities: Not on file     Unable to Pay for Housing in the Last Year: No     Has Housin        Current Outpatient Medications:     acetaminophen (Mapap Arthritis Pain) 650 mg CR tablet, Take 1 tablet (650 mg total) by mouth 2 (two) times a day (Patient not taking: Reported on 2025), Disp: 60 tablet, Rfl: 1    atorvastatin (LIPITOR)  40 mg tablet, Take 1 tablet (40 mg total) by mouth daily, Disp: 90 tablet, Rfl: 1    clopidogrel (Plavix) 75 mg tablet, Take 1 tablet (75 mg total) by mouth daily, Disp: 90 tablet, Rfl: 1    Continuous Glucose Sensor (FreeStyle Taylor 3 Sensor) MISC, Use 1 each every 14 (fourteen) days, Disp: 1 each, Rfl: 0    diltiazem (CARDIZEM) 120 MG tablet, Take 1 tablet (120 mg total) by mouth every 12 (twelve) hours, Disp: 180 tablet, Rfl: 1    Eliquis 5 MG, Take 1 tablet (5 mg total) by mouth 2 (two) times a day (Patient not taking: Reported on 2/28/2025), Disp: 60 tablet, Rfl: 11    escitalopram (LEXAPRO) 20 mg tablet, Take 1 tablet (20 mg total) by mouth daily, Disp: 90 tablet, Rfl: 0    glucose blood (Accu-Chek Guide) test strip, USE DAILY (Patient not taking: Reported on 2/28/2025), Disp: 100 strip, Rfl: 2    Insulin Glargine Solostar (Lantus SoloStar) 100 UNIT/ML SOPN, Inject 0.3 mL (30 Units total) under the skin daily at bedtime, Disp: 45 mL, Rfl: 1    insulin lispro (HumaLOG) 100 units/mL injection pen, Inject up to 20 Units under the skin 3 (three) times a day with meals, Disp: 15 mL, Rfl: 5    Insulin Pen Needle (Easy Comfort Pen Needles) 33G X 5 MM MISC, USE AS DIRECTED, Disp: 100 each, Rfl: 4    Lancets (onetouch ultrasoft) lancets, Use as instructed (Patient not taking: Reported on 2/17/2025), Disp: 100 each, Rfl: 3    Lancets (onetouch ultrasoft) lancets, Use daily (Patient not taking: Reported on 2/17/2025), Disp: 100 each, Rfl: 3    lisinopril (ZESTRIL) 40 mg tablet, Take 1 tablet (40 mg total) by mouth daily, Disp: 90 tablet, Rfl: 1    metFORMIN (GLUCOPHAGE-XR) 750 mg 24 hr tablet, Take 1 tablet (750 mg total) by mouth 2 (two) times a day with meals, Disp: 180 tablet, Rfl: 1    metoprolol succinate (TOPROL-XL) 100 mg 24 hr tablet, Take 1 tablet (100 mg total) by mouth 2 (two) times a day, Disp: 180 tablet, Rfl: 0    mupirocin (BACTROBAN) 2 % ointment, Apply topically 3 (three) times a day (Patient not taking:  Reported on 2/28/2025), Disp: 15 g, Rfl: 0    promethazine (PHENERGAN) 12.5 mg/10 mL oral solution, Take 10 mL (12.5 mg total) by mouth every 6 (six) hours as needed (cough), Disp: 118 mL, Rfl: 0    tamsulosin (FLOMAX) 0.4 mg, Take 1 capsule (0.4 mg total) by mouth daily with dinner, Disp: 90 capsule, Rfl: 1  No current facility-administered medications for this visit.    Review of Systems   Constitutional:  Negative for chills and fever.   Respiratory:  Negative for shortness of breath.    Cardiovascular:  Negative for chest pain.   Gastrointestinal:  Negative for nausea and vomiting.   Skin:  Positive for wound.   Neurological:  Positive for numbness. Negative for weakness.       Objective:  /90   Pulse 56   Temp 97.9 °F (36.6 °C)   Resp 18         Physical Exam  Vitals and nursing note reviewed.   Constitutional:       General: He is not in acute distress.     Appearance: He is not toxic-appearing or diaphoretic.   Cardiovascular:      Rate and Rhythm: Normal rate and regular rhythm.      Pulses:           Dorsalis pedis pulses are 1+ on the right side and 1+ on the left side.        Posterior tibial pulses are 0 on the right side and 0 on the left side.   Pulmonary:      Effort: Pulmonary effort is normal. No respiratory distress.   Musculoskeletal:         General: Deformity present. No signs of injury.      Right lower leg: Edema present.      Left lower leg: Edema present.      Right foot: No foot drop.      Left foot: No foot drop.      Comments: S/P partial foot amputation right.   Skin:     General: Skin is warm.      Capillary Refill: Capillary refill takes less than 2 seconds.      Coloration: Skin is not cyanotic or mottled.      Findings: Wound present. No abscess.      Nails: There is no clubbing.      Comments: Wound on left 3rd toe is little larger.  Wound bed is granular.  No redness or purulence.  No bone exposure.  Wounds are stable and healing on right plantar foot and leg.  Wound bed  is granular.   No cellulitis.  No deep probing.  No signs of infection.  See wound assessment and photo.   Neurological:      General: No focal deficit present.      Mental Status: He is alert and oriented to person, place, and time.      Cranial Nerves: No cranial nerve deficit.      Sensory: Sensory deficit present.      Motor: No weakness.      Coordination: Coordination normal.   Psychiatric:         Mood and Affect: Mood normal.         Behavior: Behavior normal.         Thought Content: Thought content normal.         Judgment: Judgment normal.             Wound 12/16/24 Diabetic Ulcer Foot Right;Plantar (Active)   Wound Image Images linked 04/14/25 1026   Wound Description Pink;Pale;Epithelialization;Yellow;Slough 04/14/25 1031   Non-staged Wound Description Full thickness 04/14/25 1031   Wound Length (cm) 0.5 cm 04/14/25 1031   Wound Width (cm) 0.8 cm 04/14/25 1031   Wound Depth (cm) 0.2 cm 04/14/25 1031   Wound Surface Area (cm^2) 0.4 cm^2 04/14/25 1031   Wound Volume (cm^3) 0.08 cm^3 04/14/25 1031   Calculated Wound Volume (cm^3) 0.08 cm^3 04/14/25 1031   Change in Wound Size % 63.64 04/14/25 1031   Drainage Amount Small 04/14/25 1031   Drainage Description Serosanguineous 04/14/25 1031   Lavern-wound Assessment Intact;Callus 04/14/25 1031       Wound 12/30/24 Diabetic Ulcer Toe D3, third Anterior;Left (Active)   Wound Image Images linked 04/14/25 1028   Enter Parks score: Parks Grade 1: Partial or full-thickness ulcer (superficial) 04/14/25 1029   Wound Description Pink;Yellow 04/14/25 1029   Non-staged Wound Description Full thickness 04/14/25 1029   Wound Length (cm) 0.8 cm 04/14/25 1029   Wound Width (cm) 1.1 cm 04/14/25 1029   Wound Depth (cm) 0.1 cm 04/14/25 1029   Wound Surface Area (cm^2) 0.88 cm^2 04/14/25 1029   Wound Volume (cm^3) 0.088 cm^3 04/14/25 1029   Calculated Wound Volume (cm^3) 0.09 cm^3 04/14/25 1029   Change in Wound Size % -50 04/14/25 1029   Drainage Amount Moderate 04/14/25 1029  "  Drainage Description Serosanguineous 04/14/25 1029   Lavern-wound Assessment Erythema 04/14/25 1029       Wound 03/03/25 Leg Right;Anterior (Active)   Wound Image Images linked 04/14/25 1026   Wound Description Pink;Yellow;Granulation tissue 04/14/25 1030   Non-staged Wound Description Full thickness 04/14/25 1030   Wound Length (cm) 1.8 cm 04/14/25 1030   Wound Width (cm) 0.8 cm 04/14/25 1030   Wound Depth (cm) 0.1 cm 04/14/25 1030   Wound Surface Area (cm^2) 1.44 cm^2 04/14/25 1030   Wound Volume (cm^3) 0.144 cm^3 04/14/25 1030   Calculated Wound Volume (cm^3) 0.14 cm^3 04/14/25 1030   Change in Wound Size % 74.55 04/14/25 1030   Drainage Amount Small 04/14/25 1030   Drainage Description Serosanguineous 04/14/25 1030   Lavern-wound Assessment Intact;Edema;Pink 04/14/25 1030               Results from last 6 Months   Lab Units 02/17/25  1118   WOUND CULTURE  2+ Growth of Staphylococcus aureus*         Lab Results   Component Value Date    HGBA1C 7.7 (H) 01/16/2025       Wound Instructions:  Orders Placed This Encounter   Procedures    Wound cleansing and dressings     Shower, yes. Only with a cast cover. Do not get cast wet.        Right Plantar Foot & Right Leg Wound:  Apply skin prep to skin under right foot wound.  Apply double 1/4\" offloading felt pad.  Apply Acticoat 7 followed by calcium alginate to the wounds.  Cover with 1/2 ABD to each wound.  Secure with rolled gauze & tape  TCC applied by Dr. Ponce.  Change weekly at the wound center.        Do not walk without the cast boot.   Do not put any objects into the cast to scratch.     If the cast cracks, or you develop severe pain, contact the wound center or go to the nearest ER. (If the wound center is closed)           Follow up at the wound center in one week.     Standing Status:   Future     Expected Date:   4/14/2025     Expiration Date:   4/21/2025    Wound cleansing and dressings Diabetic Ulcer Anterior;Left Toe D3, third     Wash your hands with soap " and water.  Remove old dressing, discard into plastic bag and place in trash.    Cleanse the wound with mild soap and water prior to applying a clean dressing.   Do not use tissue or cotton balls. Do not scrub the wound. Pat dry using gauze.     Shower, no. Do note get the dressing wet.          Left 3rd toe:  Apply Acticoat 3 to the wound.  Cover with alginate and tape.  Secure with tape  Change dressing three times a week.         Wear surgical shoe whenever ambulating.     Complete x-ray prior to next visit.            Follow up at the wound center in one week.     Standing Status:   Future     Expected Date:   4/14/2025     Expiration Date:   4/21/2025    Splint, Casting, Strapping     This order was created via procedure documentation    Wound Procedure Treatment Diabetic Ulcer Anterior;Left Toe D3, third     This order was created via procedure documentation    Wound Procedure Treatment     This order was created via procedure documentation    XR toe left third min 2 views     Standing Status:   Future     Expected Date:   4/14/2025     Expiration Date:   4/14/2029     Scheduling Instructions:      Bring along any outside films relating to this procedure.                   Satnam Ponce DPM

## 2025-04-14 NOTE — PATIENT INSTRUCTIONS
"Orders Placed This Encounter   Procedures    Wound cleansing and dressings     Shower, yes. Only with a cast cover. Do not get cast wet.        Right Plantar Foot & Right Leg Wound:  Apply skin prep to skin under right foot wound.  Apply double 1/4\" offloading felt pad.  Apply Acticoat 7 followed by calcium alginate to the wounds.  Cover with 1/2 ABD to each wound.  Secure with rolled gauze & tape  TCC applied by Dr. Ponce.  Change weekly at the wound center.        Do not walk without the cast boot.   Do not put any objects into the cast to scratch.     If the cast cracks, or you develop severe pain, contact the wound center or go to the nearest ER. (If the wound center is closed)           Follow up at the wound center in one week.     Standing Status:   Future     Expected Date:   4/14/2025     Expiration Date:   4/21/2025    Wound cleansing and dressings Diabetic Ulcer Anterior;Left Toe D3, third     Wash your hands with soap and water.  Remove old dressing, discard into plastic bag and place in trash.    Cleanse the wound with mild soap and water prior to applying a clean dressing.   Do not use tissue or cotton balls. Do not scrub the wound. Pat dry using gauze.     Shower, no. Do note get the dressing wet.          Left 3rd toe:  Apply Acticoat 3 to the wound.  Cover with alginate and tape.  Secure with tape  Change dressing three times a week.         Wear surgical shoe whenever ambulating.     Complete x-ray prior to next visit.            Follow up at the wound center in one week.     Standing Status:   Future     Expected Date:   4/14/2025     Expiration Date:   4/21/2025    Splint, Casting, Strapping     This order was created via procedure documentation    Wound Procedure Treatment Diabetic Ulcer Anterior;Left Toe D3, third     This order was created via procedure documentation    Wound Procedure Treatment     This order was created via procedure documentation    XR toe left third min 2 views     Standing " Status:   Future     Expected Date:   4/14/2025     Expiration Date:   4/14/2029     Scheduling Instructions:      Bring along any outside films relating to this procedure.

## 2025-04-18 ENCOUNTER — APPOINTMENT (OUTPATIENT)
Dept: RADIOLOGY | Age: 75
End: 2025-04-18
Payer: COMMERCIAL

## 2025-04-18 DIAGNOSIS — L97.521 ULCER OF TOE OF LEFT FOOT, LIMITED TO BREAKDOWN OF SKIN (HCC): ICD-10-CM

## 2025-04-18 PROCEDURE — 73660 X-RAY EXAM OF TOE(S): CPT

## 2025-04-21 ENCOUNTER — OFFICE VISIT (OUTPATIENT)
Dept: WOUND CARE | Facility: CLINIC | Age: 75
End: 2025-04-21
Payer: COMMERCIAL

## 2025-04-21 VITALS
HEART RATE: 64 BPM | RESPIRATION RATE: 16 BRPM | DIASTOLIC BLOOD PRESSURE: 80 MMHG | TEMPERATURE: 97.6 F | SYSTOLIC BLOOD PRESSURE: 150 MMHG

## 2025-04-21 DIAGNOSIS — I87.311 CHRONIC VENOUS HYPERTENSION (IDIOPATHIC) WITH ULCER OF RIGHT LOWER EXTREMITY (CODE) (HCC): ICD-10-CM

## 2025-04-21 DIAGNOSIS — E11.40 TYPE 2 DIABETES MELLITUS WITH DIABETIC NEUROPATHY, UNSPECIFIED WHETHER LONG TERM INSULIN USE (HCC): ICD-10-CM

## 2025-04-21 DIAGNOSIS — L97.521 ULCER OF TOE OF LEFT FOOT, LIMITED TO BREAKDOWN OF SKIN (HCC): ICD-10-CM

## 2025-04-21 DIAGNOSIS — S81.801A TRAUMATIC OPEN WOUND OF RIGHT LOWER LEG, INITIAL ENCOUNTER: ICD-10-CM

## 2025-04-21 DIAGNOSIS — L97.412 ULCER OF HEEL AND MIDFOOT, RIGHT, WITH FAT LAYER EXPOSED (HCC): Primary | ICD-10-CM

## 2025-04-21 PROCEDURE — 99213 OFFICE O/P EST LOW 20 MIN: CPT | Performed by: PODIATRIST

## 2025-04-21 PROCEDURE — 29445 APPL RIGID TOT CNTC LEG CAST: CPT | Performed by: PODIATRIST

## 2025-04-21 RX ORDER — LIDOCAINE 40 MG/G
CREAM TOPICAL ONCE
Status: COMPLETED | OUTPATIENT
Start: 2025-04-21 | End: 2025-04-21

## 2025-04-21 RX ADMIN — LIDOCAINE: 40 CREAM TOPICAL at 10:20

## 2025-04-21 NOTE — PROGRESS NOTES
"Wound Procedure Treatment    Performed by: Day Martinez LPN  Authorized by: Satnam Ponce DPM  Associated wounds:   Wound 12/16/24 Diabetic Ulcer Foot Right;Plantar  Wound 12/30/24 Diabetic Ulcer Toe D3, third Anterior;Left  Wound 04/21/25 Other (Comments) Leg Right;Medial    Wound cleansed with:  Soap and water   Applied to periwound:  Skin prep   Applied primary dressing:  Acticoat and Silver   Applied secondary dressing:  ABD   Offloading device appllied:  TCC   Comments:  Double 1/4\" felt pad place on right plantar. Dr. Ponce applied TCC       "

## 2025-04-21 NOTE — PATIENT INSTRUCTIONS
"Orders Placed This Encounter   Procedures    Splint, Casting, Strapping     This order was created via procedure documentation    Wound cleansing and dressings     Wound cleansing and dressings       Shower, yes. Only with a cast cover. Do not get cast wet.        Right Plantar Foot & Right Leg Wound:  Apply skin prep to skin under right foot wound.  Apply double 1/4\" offloading felt pad.  Apply Acticoat 7 followed by calcium alginate to the wounds.  Cover with 1/2 ABD to each wound.  Secure with rolled gauze & tape  TCC applied by Dr. Ponce.  Change weekly at the wound center.        Do not walk without the cast boot.   Do not put any objects into the cast to scratch.     If the cast cracks, or you develop severe pain, contact the wound center or go to the nearest ER. (If the wound center is closed)     Standing Status:   Future     Expiration Date:   4/28/2025    Wound Procedure Treatment     This order was created via procedure documentation      "

## 2025-04-21 NOTE — PROGRESS NOTES
Patient ID: Won Gaitan Jr. is a 75 y.o. male Date of Birth 1950       Chief Complaint   Patient presents with    Follow Up Wound Care Visit     Wound to right heel       Allergies:  Simvastatin and Itraconazole    Assessments:      Diagnosis ICD-10-CM Associated Orders   1. Ulcer of heel and midfoot, right, with fat layer exposed ([facility])  L97.412 Splint, Casting, Strapping     lidocaine (LMX) 4 % cream     Wound cleansing and dressings     Wound Procedure Treatment      2. Ulcer of toe of left foot, limited to breakdown of skin ([facility])  L97.521 lidocaine (LMX) 4 % cream     Wound cleansing and dressings     Wound Procedure Treatment      3. Type 2 diabetes mellitus with diabetic neuropathy, unspecified whether long term insulin use ([facility])  E11.40 Splint, Casting, Strapping     lidocaine (LMX) 4 % cream     Wound cleansing and dressings     Wound Procedure Treatment      4. Chronic venous hypertension (idiopathic) with ulcer of right lower extremity (CODE) (Former[facility])  I87.311 Wound cleansing and dressings     Wound Procedure Treatment      5. Traumatic open wound of right lower leg, initial encounter  S81.801A Wound cleansing and dressings     Wound Procedure Treatment           Plan:  Reviewed medical records.  Patient was counseled and educated on the condition and the diagnosis.   2.  The diagnosis, treatment options and prognosis were discussed with the patient.    3.   Right leg wound is healed.  Right foot ulcer and left 3rd toe ulcer are smaller.  New wound in medial right leg which is stable.  Will continue TCC.  TCC applied per protocol. Pt to pay more attention for any pressure on his wound on left 3rd toe.  Stressed on pt compliance about proper off-loading and staying off of his feet.    4. Reviewed X-ray of left 3rd toe and there was no signs of osteomyelitis.  5. RA in 1 week.         Imaging: I have personally reviewed pertinent films in PACS  Labs, pathology, and Other Studies: I have personally  reviewed pertinent reports.        Splint, Casting, Strapping    Date/Time: 4/21/2025 10:00 AM    Performed by: Satnam Ponce DPM  Authorized by: Satnam Ponce DPM    Verbal consent obtained?: Yes    Risks and benefits: Risks, benefits and alternatives were discussed    Consent given by:  Patient  Time Out:     Time out: Immediately prior to the procedure a time out was called      Time out performed at:  4/21/2025 10:33 AM  Patient states understanding of procedure being performed: Yes    Patient identity confirmed:  Verbally with patient  Procedure details:     Laterality:  Right    Location:  Foot    Foot:  R foot    Cast type:  Total contact    Splint composition: static      Supplies:  Cotton padding, sling, fiberglass, elastic bandage and skin protective strip  Post-procedure details:     Patient tolerance of procedure:  Tolerated well, no immediate complications         Subjective:     HPI  The patient presents for evaluation and treatment of wounds.  Tolerated TCC well.  No pain in his feet.  BS under control.        The following portions of the patient's history were reviewed and updated as appropriate:   Patient Active Problem List   Diagnosis    Hyperlipidemia    Type 2 diabetes mellitus with diabetic neuropathy ([facility])    Atrial fibrillation ([facility])    Right middle lobe pulmonary nodule    Type 2 diabetes mellitus with other skin ulcer (CODE) ([facility])    Essential hypertension    Benign prostatic hyperplasia with lower urinary tract symptoms    Microalbuminuria    Ulcer of right foot with fat layer exposed ([facility])    Ambulatory dysfunction    S/P transmetatarsal amputation of foot, right ([facility])    Sleep disturbance    Anxiety and depression    S/P R ALT flap to right foot    Gait abnormality    Vasomotor rhinitis    Corns and callosities    Tinea unguium    Cellulitis of left lower leg    Puncture wound of left foot with foreign body, subsequent encounter    Non-pressure chronic ulcer of other part of left foot with bone  "involvement without evidence of necrosis (HCC)    Personal history of COVID-19    Bronchitis    S/P amputation of lesser toe, left (HCC)    Ulcer of toe of left foot, limited to breakdown of skin (HCC)    Medication management    Subconjunctival hemorrhage of right eye    Ulcer of heel and midfoot, right, with fat layer exposed (HCC)     Past Medical History:   Diagnosis Date    Arthritis     Atrial fibrillation (HCC)     Diagnosed 11/2018    Benign prostate hyperplasia 04/2002    Cellulitis     right lower leg     Colon polyp 2006    Diabetes mellitus (HCC)     Hearing aid worn     bilat    Hearing loss     90% loss left ear and 40% right ear    History of clubfoot     \"since birth\"    History of pneumonia     History of TIA (transient ischemic attack) 04/25/2017 11/30/18 pt denies    Hyperlipidemia 5/15/2014    Hypertension     Infectious viral hepatitis     \"cant remember type\"    Irregular heart beat     Afib    Neuropathy     both feet    Osteomyelitis (HCC)     right great toe    Pneumonia     Right middle lobe pulmonary nodule 11/6/2018    Seasickness     Teeth missing     Type 2 diabetes mellitus with diabetic neuropathy (HCC) 11/6/2018    Wears glasses     reading    Wound, open     bottom of right foot     Past Surgical History:   Procedure Laterality Date    BUNIONECTOMY Right 12/4/2018    Procedure: 5TH METATARSAL BONE PARTIAL RESECTION, FULL THICKNESS DEBRIDEMENT OF DIABETIC ULCER;  Surgeon: Shala Flynn DPM;  [facility];  Service: Podiatry    CLOSURE DELAYED PRIMARY Left 10/30/2023    Procedure: CLOSURE DELAYED PRIMARY left foot;  Surgeon: Minh Urena DPM;  [facility];  Service: Podiatry    CLUB FOOT RELEASE Bilateral     COLONOSCOPY      COMPLEX WOUND CLOSURE TO EXTREMITY  4/27/2021    Procedure: COMPLEX WOUND CLOSURE TO EXTREMITY: RIGHT FOOT;  Surgeon: Niraj Bojorquez MD;  [facility];  Service: Plastics    EXTERNAL FIXATOR APPLICATION Right 2/12/2021    " Procedure: Application multiplane external fixation;  Surgeon: Harry Zamudio DPM;  [facility];  Service: Podiatry    FOOT HARDWARE REMOVAL Right 2/17/2021    Procedure: REMOVAL EXTERNAL FIXATOR;  Surgeon: Harry Zamudio DPM;  [facility];  Service: Podiatry    FOREIGN BODY REMOVAL Right 4/27/2021    Procedure: REMOVAL FOREIGN BODY EXTREMITY: RIGHT FOOT;  Surgeon: Niraj Bojorquez MD;  [facility];  Service: Plastics    INCISION AND DRAINAGE OF WOUND Right 2/17/2021    Procedure: INCISION AND DRAINAGE (I&D) EXTREMITY;  Surgeon: Harry Zamudio DPM;  [facility];  Service: Podiatry    INCISION AND DRAINAGE OF WOUND Left 10/26/2023    Procedure: INCISION AND DRAINAGE (I&D) EXTREMITY; WASHOUT; REMOVAL OF FOREIGN BODY;  Surgeon: Satnam Ponce DPM;  [facility];  Service: Podiatry    ORIF FOOT FRACTURE Right 3/4/2021    Procedure: VAC PLACEMENT; SCREW FIXATION RIGHT FOOT FUSION;  Surgeon: Harry Zamudio DPM;  [facility];  Service: Podiatry    AZ AMPUTATION FOOT TRANSMETARSAL Right 2/22/2021    Procedure: AMPUTATION TRANSMETATARSAL (TMA), REMOVAL NAIL IM T2 ICF HARDWARE;  Surgeon: Harry Zamudio DPM;  [facility];  Service: Podiatry    AZ AMPUTATION METATARSAL W/TOE SINGLE Left 11/29/2023    Procedure: LEFT FOURTH RAY RESECTION FOOT;  Surgeon: Harry Zamudio DPM;  [facility];  Service: Podiatry    AZ ARTHRD MIDTARSL/TARS MLT/TRANSVRS W/OSTEOT Right 2/12/2021    Procedure: foot ARTHRODESIS/FUSION;  Surgeon: Harry Zamudio DPM;  [facility];  Service: Podiatry    AZ DEBRIDEMENT BONE 1ST 20 SQ CM/< Right 12/2/2021    Procedure: DEBRIDEMENT OF TARSAL BONES WITH ANITBIODIC BEADS;  Surgeon: Harry Zamduio DPM;  [facility];  Service: Podiatry    AZ LNGTH/SHRT TENDON LEG/ANKLE 1 TENDON SPX Right 2/12/2021    Procedure: LENGTHEN TIBIAL TENDON, TRANS HEEL CORD;  Surgeon: Harry Zamudio DPM;  [facility];   Service: Podiatry    WV MUSC MYOCUTANEOUS/FASCIOCUTANEOUS FLAP TRUNK Right 4/12/2021    Procedure: RECONSTRUCTION MICROSURGICAL W/ FREE FLAP;  Surgeon: Niraj Bojorquez MD;  [facility];  Service: Plastics    WV MUSC MYOCUTANEOUS/FASCIOCUTANEOUS FLAP TRUNK Right 4/12/2021    Procedure: TAKEBACK RECONSTRUCTION MICROSURGICAL W/ FREE FLAP;  Surgeon: Niraj Bojorquez MD;  [facility];  Service: Plastics    WV MUSC MYOCUTANEOUS/FASCIOCUTANEOUS FLAP TRUNK Right 4/13/2021    Procedure: RECONSTRUCTION MICROSURGICAL W/ FREE FLAP, RE EXPLORATION, MICRO VASCULAR REVISION;  Surgeon: Niraj Bojorquez MD;  [facility];  Service: Plastics    WV OSTC PRTL EXOSTC/CONDYLC METAR HEAD Right 12/29/2020    Procedure: EXCISION EXOSTOSIS;  Surgeon: Harry Zamudio DPM;  [facility];  Service: Podiatry    WV SECONDARY CLOSURE SURG WOUND/DEHSN XTNSV/COMP Right 12/29/2020    Procedure: PRIMARY DELAYED CLOSURE;  Surgeon: Harry Zamudio DPM;  [facility];  Service: Podiatry    TOE AMPUTATION Right     partial great toe    TONSILLECTOMY      VAC DRESSING APPLICATION Right 3/1/2021    Procedure: APPLICATION VAC DRESSING EXTREMITY;  Surgeon: Niraj Bojorquez MD;  [facility];  Service: Plastics    VASECTOMY  1992    WOUND DEBRIDEMENT Right 3/1/2021    Procedure: DEBRIDEMENT LOWER EXTREMITY (WASH OUT);  Surgeon: Niraj Bojorquez MD;  [facility];  Service: Plastics    WOUND DEBRIDEMENT Right 4/7/2021    Procedure: DEBRIDEMENT RIGHT FOOT;  Surgeon: Niraj Bojorquez MD;  [facility];  Service: Plastics    WOUND DEBRIDEMENT Right 4/12/2021    Procedure: DEBRIDEMENT LOWER EXTREMITY (WASH OUT);  Surgeon: Niraj Bojorquez MD;  [facility];  Service: Plastics    WOUND DEBRIDEMENT Right 4/27/2021    Procedure: DEBRIDEMENT LOWER EXTREMITY (WASH OUT): RIGHT FOOT;  Surgeon: Niraj Bojorquez MD;  [facility];  Service: Plastics     Family History   Problem Relation Age of Onset    Diabetes Father         mellitus      Social History     Socioeconomic History    Marital status: /Civil Union     Spouse name: None    Number of children: None    Years of education: None    Highest education level: None   Occupational History    None   Tobacco Use    Smoking status: Former     Current packs/day: 0.00     Types: Cigarettes     Quit date: 1988     Years since quittin.4     Passive exposure: Past    Smokeless tobacco: Former    Tobacco comments:     exposure to passive smoke   Vaping Use    Vaping status: Never Used   Substance and Sexual Activity    Alcohol use: Yes     Comment: occasionally    Drug use: Not Currently    Sexual activity: Yes     Partners: Female   Other Topics Concern    None   Social History Narrative    None     Social Drivers of Health     Financial Resource Strain: Not on file   Food Insecurity: No Food Insecurity (2025)    Nursing - Inadequate Food Risk Classification     Worried About Running Out of Food in the Last Year: Never true     Ran Out of Food in the Last Year: Never true     Ran Out of Food in the Last Year: Never true   Transportation Needs: No Transportation Needs (2025)    Nursing - Transportation Risk Classification     Lack of Transportation: Not on file     Lack of Transportation: No   Physical Activity: Not on file   Stress: Not on file   Social Connections: Not on file   Intimate Partner Violence: Unknown (2025)    Nursing IPS     Feels Physically and Emotionally Safe: Not on file     Physically Hurt by Someone: Not on file     Humiliated or Emotionally Abused by Someone: Not on file     Physically Hurt by Someone: No     Hurt or Threatened by Someone: No   Housing Stability: Unknown (2025)    Nursing: Inadequate Housing Risk Classification     Has Housing: Not on file     Worried About Losing Housing: Not on file     Unable to Get Utilities: Not  on file     Unable to Pay for Housing in the Last Year: No     Has Housin        Current Outpatient Medications:     acetaminophen (Mapap Arthritis Pain) 650 mg CR tablet, Take 1 tablet (650 mg total) by mouth 2 (two) times a day (Patient not taking: Reported on 2025), Disp: 60 tablet, Rfl: 1    atorvastatin (LIPITOR) 40 mg tablet, Take 1 tablet (40 mg total) by mouth daily, Disp: 90 tablet, Rfl: 1    clopidogrel (Plavix) 75 mg tablet, Take 1 tablet (75 mg total) by mouth daily, Disp: 90 tablet, Rfl: 1    Continuous Glucose Sensor (FreeStyle Taylor 3 Sensor) MISC, Use 1 each every 14 (fourteen) days, Disp: 1 each, Rfl: 0    diltiazem (CARDIZEM) 120 MG tablet, Take 1 tablet (120 mg total) by mouth every 12 (twelve) hours, Disp: 180 tablet, Rfl: 1    Eliquis 5 MG, Take 1 tablet (5 mg total) by mouth 2 (two) times a day (Patient not taking: Reported on 2025), Disp: 60 tablet, Rfl: 11    escitalopram (LEXAPRO) 20 mg tablet, Take 1 tablet (20 mg total) by mouth daily, Disp: 90 tablet, Rfl: 0    glucose blood (Accu-Chek Guide) test strip, USE DAILY (Patient not taking: Reported on 2025), Disp: 100 strip, Rfl: 2    Insulin Glargine Solostar (Lantus SoloStar) 100 UNIT/ML SOPN, Inject 0.3 mL (30 Units total) under the skin daily at bedtime, Disp: 45 mL, Rfl: 1    insulin lispro (HumaLOG) 100 units/mL injection pen, Inject up to 20 Units under the skin 3 (three) times a day with meals, Disp: 15 mL, Rfl: 5    Insulin Pen Needle (Easy Comfort Pen Needles) 33G X 5 MM MISC, USE AS DIRECTED, Disp: 100 each, Rfl: 4    Lancets (onetouch ultrasoft) lancets, Use as instructed (Patient not taking: Reported on 2025), Disp: 100 each, Rfl: 3    Lancets (onetouch ultrasoft) lancets, Use daily (Patient not taking: Reported on 2025), Disp: 100 each, Rfl: 3    lisinopril (ZESTRIL) 40 mg tablet, Take 1 tablet (40 mg total) by mouth daily, Disp: 90 tablet, Rfl: 1    metFORMIN (GLUCOPHAGE-XR) 750 mg 24 hr tablet, Take  1 tablet (750 mg total) by mouth 2 (two) times a day with meals, Disp: 180 tablet, Rfl: 1    metoprolol succinate (TOPROL-XL) 100 mg 24 hr tablet, Take 1 tablet (100 mg total) by mouth 2 (two) times a day, Disp: 180 tablet, Rfl: 0    mupirocin (BACTROBAN) 2 % ointment, Apply topically 3 (three) times a day (Patient not taking: Reported on 2/28/2025), Disp: 15 g, Rfl: 0    promethazine (PHENERGAN) 12.5 mg/10 mL oral solution, Take 10 mL (12.5 mg total) by mouth every 6 (six) hours as needed (cough), Disp: 118 mL, Rfl: 0    tamsulosin (FLOMAX) 0.4 mg, Take 1 capsule (0.4 mg total) by mouth daily with dinner, Disp: 90 capsule, Rfl: 1  No current facility-administered medications for this visit.    Review of Systems   Constitutional:  Negative for chills and fever.   Respiratory:  Negative for shortness of breath.    Cardiovascular:  Negative for chest pain.   Gastrointestinal:  Negative for nausea and vomiting.   Skin:  Positive for wound.   Neurological:  Positive for numbness. Negative for weakness.       Objective:  /80   Pulse 64   Temp 97.6 °F (36.4 °C)   Resp 16   Pain Score: 0-No pain     Physical Exam  Vitals and nursing note reviewed.   Constitutional:       General: He is not in acute distress.     Appearance: He is not toxic-appearing or diaphoretic.   Cardiovascular:      Rate and Rhythm: Normal rate and regular rhythm.      Pulses:           Dorsalis pedis pulses are 1+ on the right side and 1+ on the left side.        Posterior tibial pulses are 0 on the right side and 0 on the left side.   Pulmonary:      Effort: Pulmonary effort is normal. No respiratory distress.   Musculoskeletal:         General: Deformity present. No signs of injury.      Right lower leg: Edema present.      Left lower leg: Edema present.      Right foot: No foot drop.      Left foot: No foot drop.      Comments: S/P partial foot amputation right.   Skin:     General: Skin is warm.      Capillary Refill: Capillary refill  takes less than 2 seconds.      Coloration: Skin is not cyanotic or mottled.      Findings: Wound present. No abscess.      Nails: There is no clubbing.      Comments: Wound on left 3rd toe is smaller.  Wound bed is granular.  No redness or purulence.  No bone exposure.  Wounds are stable and healing on right plantar foot.  Right anterior leg wound is healed.  New granular ulcer in medial right leg.  Minimal depth.  No cellulitis. See wound assessment and photo.   Neurological:      General: No focal deficit present.      Mental Status: He is alert and oriented to person, place, and time.      Cranial Nerves: No cranial nerve deficit.      Sensory: Sensory deficit present.      Motor: No weakness.      Coordination: Coordination normal.   Psychiatric:         Mood and Affect: Mood normal.         Behavior: Behavior normal.         Thought Content: Thought content normal.         Judgment: Judgment normal.             Wound 12/16/24 Diabetic Ulcer Foot Right;Plantar (Active)   Wound Image Images linked 04/21/25 1011   Enter Parks score: Parks Grade 1: Partial or full-thickness ulcer (superficial) 04/21/25 1015   Wound Description Pink;Pale 04/21/25 1015   Non-staged Wound Description Full thickness 04/21/25 1015   Wound Length (cm) 0.4 cm 04/21/25 1015   Wound Width (cm) 0.6 cm 04/21/25 1015   Wound Depth (cm) 0.3 cm 04/21/25 1015   Wound Surface Area (cm^2) 0.24 cm^2 04/21/25 1015   Wound Volume (cm^3) 0.072 cm^3 04/21/25 1015   Calculated Wound Volume (cm^3) 0.07 cm^3 04/21/25 1015   Change in Wound Size % 68.18 04/21/25 1015   Number of underminings 1 04/21/25 1015   Undermining 1 0.2 04/21/25 1015   Undermining 1 is depth extending from 11-4 deepest at 1 04/21/25 1015   Drainage Amount Small 04/21/25 1015   Drainage Description Tan 04/21/25 1015   Lavern-wound Assessment Intact;Callus 04/21/25 1015       Wound 12/30/24 Diabetic Ulcer Toe D3, third Anterior;Left (Active)   Wound Image Images linked 04/21/25 1015  "  Enter Parks score: Not applicable: Diabetic patient with ulcer other than foot 04/21/25 1019   Wound Description Epithelialization;Beefy red 04/21/25 1019   Wound Length (cm) 0.5 cm 04/21/25 1019   Wound Width (cm) 0.3 cm 04/21/25 1019   Wound Depth (cm) 0.1 cm 04/21/25 1019   Wound Surface Area (cm^2) 0.15 cm^2 04/21/25 1019   Wound Volume (cm^3) 0.015 cm^3 04/21/25 1019   Calculated Wound Volume (cm^3) 0.02 cm^3 04/21/25 1019   Change in Wound Size % 66.67 04/21/25 1019   Drainage Amount Scant 04/21/25 1019   Drainage Description Serosanguineous 04/21/25 1019   Lavern-wound Assessment Dry;Scaly 04/21/25 1019       Wound 04/21/25 Other (Comments) Leg Right;Medial (Active)   Wound Image Images linked 04/21/25 1014   Wound Description Beefy red;Epithelialization;Granulation tissue 04/21/25 1018   Non-staged Wound Description Partial thickness 04/21/25 1018   Wound Length (cm) 2 cm 04/21/25 1018   Wound Width (cm) 1 cm 04/21/25 1018   Wound Depth (cm) 0.1 cm 04/21/25 1018   Wound Surface Area (cm^2) 2 cm^2 04/21/25 1018   Wound Volume (cm^3) 0.2 cm^3 04/21/25 1018   Calculated Wound Volume (cm^3) 0.2 cm^3 04/21/25 1018   Drainage Amount Small 04/21/25 1018   Drainage Description Bloody 04/21/25 1018   Lavern-wound Assessment Intact;Scaly;Dry 04/21/25 1018                 Results from last 6 Months   Lab Units 02/17/25  1118   WOUND CULTURE  2+ Growth of Staphylococcus aureus*         Lab Results   Component Value Date    HGBA1C 7.7 (H) 01/16/2025       Wound Instructions:  Orders Placed This Encounter   Procedures    Splint, Casting, Strapping     This order was created via procedure documentation    Wound cleansing and dressings     Wound cleansing and dressings       Shower, yes. Only with a cast cover. Do not get cast wet.        Right Plantar Foot & Right Leg Wound:  Apply skin prep to skin under right foot wound.  Apply double 1/4\" offloading felt pad.  Apply Acticoat 7 followed by calcium alginate to the " wounds.  Cover with 1/2 ABD to each wound.  Secure with rolled gauze & tape  TCC applied by Dr. Ponce.  Change weekly at the wound center.        Do not walk without the cast boot.   Do not put any objects into the cast to scratch.     If the cast cracks, or you develop severe pain, contact the wound center or go to the nearest ER. (If the wound center is closed)     Standing Status:   Future     Expiration Date:   4/28/2025    Wound Procedure Treatment     This order was created via procedure documentation             Satnam Ponce DPM

## 2025-04-28 ENCOUNTER — OFFICE VISIT (OUTPATIENT)
Dept: WOUND CARE | Facility: CLINIC | Age: 75
End: 2025-04-28
Payer: COMMERCIAL

## 2025-04-28 ENCOUNTER — OFFICE VISIT (OUTPATIENT)
Dept: FAMILY MEDICINE CLINIC | Facility: CLINIC | Age: 75
End: 2025-04-28
Payer: COMMERCIAL

## 2025-04-28 VITALS
HEART RATE: 75 BPM | SYSTOLIC BLOOD PRESSURE: 134 MMHG | WEIGHT: 256.6 LBS | BODY MASS INDEX: 31.25 KG/M2 | OXYGEN SATURATION: 98 % | TEMPERATURE: 96.6 F | HEIGHT: 76 IN | DIASTOLIC BLOOD PRESSURE: 84 MMHG

## 2025-04-28 VITALS
RESPIRATION RATE: 16 BRPM | DIASTOLIC BLOOD PRESSURE: 80 MMHG | HEART RATE: 76 BPM | TEMPERATURE: 98.7 F | SYSTOLIC BLOOD PRESSURE: 180 MMHG

## 2025-04-28 DIAGNOSIS — L97.521 ULCER OF TOE OF LEFT FOOT, LIMITED TO BREAKDOWN OF SKIN (HCC): ICD-10-CM

## 2025-04-28 DIAGNOSIS — I87.311 CHRONIC VENOUS HYPERTENSION (IDIOPATHIC) WITH ULCER OF RIGHT LOWER EXTREMITY (CODE) (HCC): ICD-10-CM

## 2025-04-28 DIAGNOSIS — E11.40 TYPE 2 DIABETES MELLITUS WITH DIABETIC NEUROPATHY, UNSPECIFIED WHETHER LONG TERM INSULIN USE (HCC): Primary | ICD-10-CM

## 2025-04-28 DIAGNOSIS — L97.412 ULCER OF HEEL AND MIDFOOT, RIGHT, WITH FAT LAYER EXPOSED (HCC): Primary | ICD-10-CM

## 2025-04-28 DIAGNOSIS — E11.40 TYPE 2 DIABETES MELLITUS WITH DIABETIC NEUROPATHY, UNSPECIFIED WHETHER LONG TERM INSULIN USE (HCC): ICD-10-CM

## 2025-04-28 LAB — SL AMB POCT HEMOGLOBIN AIC: 7.2 (ref ?–6.5)

## 2025-04-28 PROCEDURE — 83036 HEMOGLOBIN GLYCOSYLATED A1C: CPT | Performed by: FAMILY MEDICINE

## 2025-04-28 PROCEDURE — 99214 OFFICE O/P EST MOD 30 MIN: CPT | Performed by: FAMILY MEDICINE

## 2025-04-28 PROCEDURE — 29445 APPL RIGID TOT CNTC LEG CAST: CPT | Performed by: PODIATRIST

## 2025-04-28 NOTE — PATIENT INSTRUCTIONS
"Orders Placed This Encounter   Procedures    Splint, Casting, Strapping     This order was created via procedure documentation    Wound cleansing and dressings Diabetic Ulcer Right;Plantar Foot     Shower, yes. Only with a cast cover. Do not get cast wet.        Right Leg Wound:  Apply skin prep to skin under right foot wound.  Apply double 1/4\" offloading felt pad.  Apply Acticoat 7 followed by calcium alginate to the wounds.  Cover with 1/2 ABD to each wound.  Secure with rolled gauze & tape  TCC applied by Dr. Ponce.  Change weekly at the wound center.        Do not walk without the cast boot.   Do not put any objects into the cast to scratch.     If the cast cracks, or you develop severe pain, contact the wound center or go to the nearest ER. (If the wound center is closed)     Standing Status:   Future     Expiration Date:   5/5/2025    Wound cleansing and dressings Right;Medial Leg     Right Leg  Apply Acticoat 7 followed by calcium alginate to the wounds.  Cover with 1/2 ABD to each wound.  Secure with rolled gauze & tape  Change weekly at the wound center with TCC change     Standing Status:   Future     Expiration Date:   5/5/2025    Wound cleansing and dressings Right;Medial Leg     Left 3rd toe Wound:  Apply Acticoat 7 followed by calcium alginate to the wounds.  Cover with 1/2 ABD to each wound.  Secure with rolled gauze & tape  Change 3 x weekly & as needed for soilage & dislodgment at the wound center.     Standing Status:   Future     Expiration Date:   5/5/2025      "

## 2025-04-28 NOTE — PROGRESS NOTES
Patient ID: Won Gaitan Jr. is a 75 y.o. male Date of Birth 1950       Chief Complaint   Patient presents with    Follow Up Wound Care Visit     Follow up visit for wound to right foot.        Allergies:  Simvastatin and Itraconazole    Assessments:      Diagnosis ICD-10-CM Associated Orders   1. Ulcer of heel and midfoot, right, with fat layer exposed ([facility])  L97.412 Splint, Casting, Strapping     Wound cleansing and dressings Diabetic Ulcer Right;Plantar Foot     Wound cleansing and dressings Right;Medial Leg     Wound cleansing and dressings Right;Medial Leg     Wound Procedure Treatment Diabetic Ulcer Right;Plantar Foot     Wound Procedure Treatment Diabetic Ulcer Anterior;Left Toe D3, third     Wound Procedure Treatment Right;Medial Leg      2. Ulcer of toe of left foot, limited to breakdown of skin ([facility])  L97.521 Wound cleansing and dressings Diabetic Ulcer Right;Plantar Foot     Wound cleansing and dressings Right;Medial Leg     Wound cleansing and dressings Right;Medial Leg     Wound Procedure Treatment Diabetic Ulcer Right;Plantar Foot     Wound Procedure Treatment Diabetic Ulcer Anterior;Left Toe D3, third     Wound Procedure Treatment Right;Medial Leg      3. Type 2 diabetes mellitus with diabetic neuropathy, unspecified whether long term insulin use ([facility])  E11.40 Splint, Casting, Strapping     Wound cleansing and dressings Diabetic Ulcer Right;Plantar Foot     Wound cleansing and dressings Right;Medial Leg     Wound cleansing and dressings Right;Medial Leg     Wound Procedure Treatment Diabetic Ulcer Right;Plantar Foot     Wound Procedure Treatment Diabetic Ulcer Anterior;Left Toe D3, third     Wound Procedure Treatment Right;Medial Leg      4. Chronic venous hypertension (idiopathic) with ulcer of right lower extremity (CODE) ([facility])  I87.311 Wound cleansing and dressings Diabetic Ulcer Right;Plantar Foot     Wound cleansing and dressings Right;Medial Leg     Wound cleansing and dressings  Right;Medial Leg     Wound Procedure Treatment Diabetic Ulcer Right;Plantar Foot     Wound Procedure Treatment Diabetic Ulcer Anterior;Left Toe D3, third     Wound Procedure Treatment Right;Medial Leg             Plan:  Reviewed medical records.  Patient was counseled and educated on the condition and the diagnosis.   2.  The diagnosis, treatment options and prognosis were discussed with the patient.    3.   Will continue TCC.  TCC applied per protocol. Pt to pay more attention for any pressure on his wound on left 3rd toe.  Stressed on pt compliance about proper off-loading and staying off of his feet.    4.  RA in 1 week.         Imaging: I have personally reviewed pertinent films in PACS  Labs, pathology, and Other Studies: I have personally reviewed pertinent reports.        Splint, Casting, Strapping    Date/Time: 4/28/2025 10:00 AM    Performed by: Satnam Ponce DPM  Authorized by: Satnam Ponce DPM    Verbal consent obtained?: Yes    Risks and benefits: Risks, benefits and alternatives were discussed    Consent given by:  Patient  Time Out:     Time out: Immediately prior to the procedure a time out was called      Time out performed at:  4/28/2025 11:09 AM  Patient states understanding of procedure being performed: Yes    Patient identity confirmed:  Verbally with patient  Procedure details:     Laterality:  Right    Location:  Foot    Foot:  R foot    Cast type:  Total contact    Splint composition: static      Supplies:  Cotton padding, sling, fiberglass, elastic bandage and skin protective strip  Post-procedure details:     Patient tolerance of procedure:  Tolerated well, no immediate complications         Subjective:     HPI  The patient presents for evaluation and treatment of wounds.  Tolerated TCC well.  No pain in his feet.  BS under control.  No new complaint.      The following portions of the patient's history were reviewed and updated as appropriate:   Patient Active Problem List   Diagnosis     "Hyperlipidemia    Type 2 diabetes mellitus with diabetic neuropathy (HCC)    Atrial fibrillation (HCC)    Right middle lobe pulmonary nodule    Type 2 diabetes mellitus with other skin ulcer (CODE) (HCC)    Essential hypertension    Benign prostatic hyperplasia with lower urinary tract symptoms    Microalbuminuria    Ulcer of right foot with fat layer exposed (HCC)    Ambulatory dysfunction    S/P transmetatarsal amputation of foot, right (HCC)    Sleep disturbance    Anxiety and depression    S/P R ALT flap to right foot    Gait abnormality    Vasomotor rhinitis    Corns and callosities    Tinea unguium    Cellulitis of left lower leg    Puncture wound of left foot with foreign body, subsequent encounter    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 (HCC)    Personal history of COVID-19    Bronchitis    S/P amputation of lesser toe, left (HCC)    Ulcer of toe of left foot, limited to breakdown of skin (HCC)    Medication management    Subconjunctival hemorrhage of right eye    Ulcer of heel and midfoot, right, with fat layer exposed (HCC)     Past Medical History:   Diagnosis Date    Arthritis     Atrial fibrillation (HCC)     Diagnosed 11/2018    Benign prostate hyperplasia 04/2002    Cellulitis     right lower leg     Colon polyp 2006    Diabetes mellitus (HCC)     Hearing aid worn     bilat    Hearing loss     90% loss left ear and 40% right ear    History of clubfoot     \"since birth\"    History of pneumonia     History of TIA (transient ischemic attack) 04/25/2017 11/30/18 pt denies    Hyperlipidemia 5/15/2014    Hypertension     Infectious viral hepatitis     \"cant remember type\"    Irregular heart beat     Afib    Neuropathy     both feet    Osteomyelitis (HCC)     right great toe    Pneumonia     Right middle lobe pulmonary nodule 11/6/2018    Seasickness     Teeth missing     Type 2 diabetes mellitus with diabetic neuropathy (HCC) 11/6/2018 "    Wears glasses     reading    Wound, open     bottom of right foot     Past Surgical History:   Procedure Laterality Date    BUNIONECTOMY Right 12/4/2018    Procedure: 5TH METATARSAL BONE PARTIAL RESECTION, FULL THICKNESS DEBRIDEMENT OF DIABETIC ULCER;  Surgeon: Shala Flynn DPM;  [facility];  Service: Podiatry    CLOSURE DELAYED PRIMARY Left 10/30/2023    Procedure: CLOSURE DELAYED PRIMARY left foot;  Surgeon: Minh Urena DPM;  [facility];  Service: Podiatry    CLUB FOOT RELEASE Bilateral     COLONOSCOPY      COMPLEX WOUND CLOSURE TO EXTREMITY  4/27/2021    Procedure: COMPLEX WOUND CLOSURE TO EXTREMITY: RIGHT FOOT;  Surgeon: Niraj Bojorquez MD;  [facility];  Service: Plastics    EXTERNAL FIXATOR APPLICATION Right 2/12/2021    Procedure: Application multiplane external fixation;  Surgeon: Harry Zamudio DPM;  [facility];  Service: Podiatry    FOOT HARDWARE REMOVAL Right 2/17/2021    Procedure: REMOVAL EXTERNAL FIXATOR;  Surgeon: Harry Zamudio DPM;  [facility];  Service: Podiatry    FOREIGN BODY REMOVAL Right 4/27/2021    Procedure: REMOVAL FOREIGN BODY EXTREMITY: RIGHT FOOT;  Surgeon: Niraj Bojorquez MD;  [facility];  Service: Plastics    INCISION AND DRAINAGE OF WOUND Right 2/17/2021    Procedure: INCISION AND DRAINAGE (I&D) EXTREMITY;  Surgeon: Harry Zamudio DPM;  [facility];  Service: Podiatry    INCISION AND DRAINAGE OF WOUND Left 10/26/2023    Procedure: INCISION AND DRAINAGE (I&D) EXTREMITY; WASHOUT; REMOVAL OF FOREIGN BODY;  Surgeon: Satnam Ponce DPM;  [facility];  Service: Podiatry    ORIF FOOT FRACTURE Right 3/4/2021    Procedure: VAC PLACEMENT; SCREW FIXATION RIGHT FOOT FUSION;  Surgeon: Harry Zamudio DPM;  [facility];  Service: Podiatry    SC AMPUTATION FOOT TRANSMETARSAL Right 2/22/2021    Procedure: AMPUTATION TRANSMETATARSAL (TMA), REMOVAL NAIL IM T2 ICF HARDWARE;  Surgeon: Harry  DONOVAN Zamudio;  [facility];  Service: Podiatry    IN AMPUTATION METATARSAL W/TOE SINGLE Left 11/29/2023    Procedure: LEFT FOURTH RAY RESECTION FOOT;  Surgeon: Harry Zamudio DPM;  [facility];  Service: Podiatry    IN ARTHRD MIDTARSL/TARS MLT/TRANSVRS W/OSTEOT Right 2/12/2021    Procedure: foot ARTHRODESIS/FUSION;  Surgeon: Harry Zamudio DPM;  [facility];  Service: Podiatry    IN DEBRIDEMENT BONE 1ST 20 SQ CM/< Right 12/2/2021    Procedure: DEBRIDEMENT OF TARSAL BONES WITH ANITBIODIC BEADS;  Surgeon: Harry Zamudio DPM;  [facility];  Service: Podiatry    IN LNGTH/SHRT TENDON LEG/ANKLE 1 TENDON SPX Right 2/12/2021    Procedure: LENGTHEN TIBIAL TENDON, TRANS HEEL CORD;  Surgeon: Harry Zamudio DPM;  [facility];  Service: Podiatry    IN MUSC MYOCUTANEOUS/FASCIOCUTANEOUS FLAP TRUNK Right 4/12/2021    Procedure: RECONSTRUCTION MICROSURGICAL W/ FREE FLAP;  Surgeon: Niraj Bojorquez MD;  [facility];  Service: Plastics    IN MUSC MYOCUTANEOUS/FASCIOCUTANEOUS FLAP TRUNK Right 4/12/2021    Procedure: TAKEBACK RECONSTRUCTION MICROSURGICAL W/ FREE FLAP;  Surgeon: Niraj Bojorquez MD;  [facility];  Service: Plastics    IN MUSC MYOCUTANEOUS/FASCIOCUTANEOUS FLAP TRUNK Right 4/13/2021    Procedure: RECONSTRUCTION MICROSURGICAL W/ FREE FLAP, RE EXPLORATION, MICRO VASCULAR REVISION;  Surgeon: Niraj Bojorquez MD;  [facility];  Service: Plastics    IN OSTC PRTL EXOSTC/CONDYLC METAR HEAD Right 12/29/2020    Procedure: EXCISION EXOSTOSIS;  Surgeon: Harry Zamudio DPM;  [facility];  Service: Podiatry    IN SECONDARY CLOSURE SURG WOUND/DEHSN XTNSV/COMP Right 12/29/2020    Procedure: PRIMARY DELAYED CLOSURE;  Surgeon: Harry Zamudio DPM;  [facility];  Service: Podiatry    TOE AMPUTATION Right     partial great toe    TONSILLECTOMY      VAC DRESSING APPLICATION Right 3/1/2021    Procedure: APPLICATION VAC  DRESSING EXTREMITY;  Surgeon: Niraj Bojorquez MD;  [facility];  Service: Plastics    VASECTOMY  1992    WOUND DEBRIDEMENT Right 3/1/2021    Procedure: DEBRIDEMENT LOWER EXTREMITY (WASH OUT);  Surgeon: Niraj Bojorquez MD;  [facility];  Service: Plastics    WOUND DEBRIDEMENT Right 2021    Procedure: DEBRIDEMENT RIGHT FOOT;  Surgeon: Niraj Bojorquez MD;  [facility];  Service: Plastics    WOUND DEBRIDEMENT Right 2021    Procedure: DEBRIDEMENT LOWER EXTREMITY (WASH OUT);  Surgeon: Niraj Bojorquez MD;  [facility];  Service: Plastics    WOUND DEBRIDEMENT Right 2021    Procedure: DEBRIDEMENT LOWER EXTREMITY (WASH OUT): RIGHT FOOT;  Surgeon: Niraj Bojorquez MD;  [facility];  Service: Plastics     Family History   Problem Relation Age of Onset    Diabetes Father         mellitus      Social History     Socioeconomic History    Marital status: /Civil Union     Spouse name: None    Number of children: None    Years of education: None    Highest education level: None   Occupational History    None   Tobacco Use    Smoking status: Former     Current packs/day: 0.00     Types: Cigarettes     Quit date: 1988     Years since quittin.4     Passive exposure: Past    Smokeless tobacco: Former    Tobacco comments:     exposure to passive smoke   Vaping Use    Vaping status: Never Used   Substance and Sexual Activity    Alcohol use: Yes     Comment: occasionally    Drug use: Not Currently    Sexual activity: Yes     Partners: Female   Other Topics Concern    None   Social History Narrative    None     Social Drivers of Health     Financial Resource Strain: Not on file   Food Insecurity: No Food Insecurity (2025)    Nursing - Inadequate Food Risk Classification     Worried About Running Out of Food in the Last Year: Never true     Ran Out of Food in the Last Year: Never true     Ran Out of Food in the Last  Year: Never true   Transportation Needs: No Transportation Needs (2025)    Nursing - Transportation Risk Classification     Lack of Transportation: Not on file     Lack of Transportation: No   Physical Activity: Not on file   Stress: Not on file   Social Connections: Not on file   Intimate Partner Violence: Unknown (2025)    Nursing IPS     Feels Physically and Emotionally Safe: Not on file     Physically Hurt by Someone: Not on file     Humiliated or Emotionally Abused by Someone: Not on file     Physically Hurt by Someone: No     Hurt or Threatened by Someone: No   Housing Stability: Unknown (2025)    Nursing: Inadequate Housing Risk Classification     Has Housing: Not on file     Worried About Losing Housing: Not on file     Unable to Get Utilities: Not on file     Unable to Pay for Housing in the Last Year: No     Has Housin        Current Outpatient Medications:     acetaminophen (Mapap Arthritis Pain) 650 mg CR tablet, Take 1 tablet (650 mg total) by mouth 2 (two) times a day (Patient not taking: Reported on 2025), Disp: 60 tablet, Rfl: 1    atorvastatin (LIPITOR) 40 mg tablet, Take 1 tablet (40 mg total) by mouth daily, Disp: 90 tablet, Rfl: 1    clopidogrel (Plavix) 75 mg tablet, Take 1 tablet (75 mg total) by mouth daily, Disp: 90 tablet, Rfl: 1    Continuous Glucose Sensor (FreeStyle Taylor 3 Sensor) MISC, Use 1 each every 14 (fourteen) days, Disp: 1 each, Rfl: 0    diltiazem (CARDIZEM) 120 MG tablet, Take 1 tablet (120 mg total) by mouth every 12 (twelve) hours, Disp: 180 tablet, Rfl: 1    Eliquis 5 MG, Take 1 tablet (5 mg total) by mouth 2 (two) times a day (Patient not taking: Reported on 2025), Disp: 60 tablet, Rfl: 11    escitalopram (LEXAPRO) 20 mg tablet, Take 1 tablet (20 mg total) by mouth daily, Disp: 90 tablet, Rfl: 0    glucose blood (Accu-Chek Guide) test strip, USE DAILY (Patient not taking: Reported on 2025), Disp: 100 strip, Rfl: 2    Insulin Glargine  Solostar (Lantus SoloStar) 100 UNIT/ML SOPN, Inject 0.3 mL (30 Units total) under the skin daily at bedtime, Disp: 45 mL, Rfl: 1    insulin lispro (HumaLOG) 100 units/mL injection pen, Inject up to 20 Units under the skin 3 (three) times a day with meals, Disp: 15 mL, Rfl: 5    Insulin Pen Needle (Easy Comfort Pen Needles) 33G X 5 MM MISC, USE AS DIRECTED, Disp: 100 each, Rfl: 4    Lancets (onetouch ultrasoft) lancets, Use as instructed (Patient not taking: Reported on 2/17/2025), Disp: 100 each, Rfl: 3    Lancets (onetouch ultrasoft) lancets, Use daily (Patient not taking: Reported on 2/17/2025), Disp: 100 each, Rfl: 3    lisinopril (ZESTRIL) 40 mg tablet, Take 1 tablet (40 mg total) by mouth daily, Disp: 90 tablet, Rfl: 1    metFORMIN (GLUCOPHAGE-XR) 750 mg 24 hr tablet, Take 1 tablet (750 mg total) by mouth 2 (two) times a day with meals, Disp: 180 tablet, Rfl: 1    metoprolol succinate (TOPROL-XL) 100 mg 24 hr tablet, Take 1 tablet (100 mg total) by mouth 2 (two) times a day, Disp: 180 tablet, Rfl: 0    mupirocin (BACTROBAN) 2 % ointment, Apply topically 3 (three) times a day (Patient not taking: Reported on 2/28/2025), Disp: 15 g, Rfl: 0    promethazine (PHENERGAN) 12.5 mg/10 mL oral solution, Take 10 mL (12.5 mg total) by mouth every 6 (six) hours as needed (cough), Disp: 118 mL, Rfl: 0    tamsulosin (FLOMAX) 0.4 mg, Take 1 capsule (0.4 mg total) by mouth daily with dinner, Disp: 90 capsule, Rfl: 1    Review of Systems   Constitutional:  Negative for chills and fever.   Respiratory:  Negative for shortness of breath.    Cardiovascular:  Negative for chest pain.   Gastrointestinal:  Negative for nausea and vomiting.   Skin:  Positive for wound.   Neurological:  Positive for numbness. Negative for weakness.       Objective:  BP (!) 180/80   Pulse 76   Temp 98.7 °F (37.1 °C) (Tympanic)   Resp 16   Pain Score: 0-No pain     Physical Exam  Vitals and nursing note reviewed.   Constitutional:       General: He is  not in acute distress.     Appearance: He is not toxic-appearing or diaphoretic.   Cardiovascular:      Rate and Rhythm: Normal rate and regular rhythm.      Pulses:           Dorsalis pedis pulses are 1+ on the right side and 1+ on the left side.        Posterior tibial pulses are 0 on the right side and 0 on the left side.   Pulmonary:      Effort: Pulmonary effort is normal. No respiratory distress.   Musculoskeletal:         General: Deformity present. No signs of injury.      Right lower leg: Edema present.      Left lower leg: Edema present.      Right foot: No foot drop.      Left foot: No foot drop.      Comments: S/P partial foot amputation right.   Skin:     General: Skin is warm.      Capillary Refill: Capillary refill takes less than 2 seconds.      Coloration: Skin is not cyanotic or mottled.      Findings: Wound present. No abscess.      Nails: There is no clubbing.      Comments: Wound on left 3rd toe is smaller.  Wound bed is granular.  Wound is stable and healing on right plantar foot.  Wound in medial right leg is healing well.  No cellulitis. See wound assessment and photo.   Neurological:      General: No focal deficit present.      Mental Status: He is alert and oriented to person, place, and time.      Cranial Nerves: No cranial nerve deficit.      Sensory: Sensory deficit present.      Motor: No weakness.      Coordination: Coordination normal.   Psychiatric:         Mood and Affect: Mood normal.         Behavior: Behavior normal.         Thought Content: Thought content normal.         Judgment: Judgment normal.             Wound 12/16/24 Diabetic Ulcer Foot Right;Plantar (Active)   Wound Image   04/28/25 1032   Enter Parks score: Parks Grade 1: Partial or full-thickness ulcer (superficial) 04/21/25 1015   Wound Description Pink;Pale 04/28/25 1032   Non-staged Wound Description Full thickness 04/28/25 1032   Wound Length (cm) 0.3 cm 04/28/25 1032   Wound Width (cm) 0.6 cm 04/28/25 1032    Wound Depth (cm) 0.3 cm 04/28/25 1032   Wound Surface Area (cm^2) 0.18 cm^2 04/28/25 1032   Wound Volume (cm^3) 0.054 cm^3 04/28/25 1032   Calculated Wound Volume (cm^3) 0.05 cm^3 04/28/25 1032   Change in Wound Size % 77.27 04/28/25 1032   Number of underminings 1 04/21/25 1015   Undermining 1 0.2 04/28/25 1032   Undermining 1 is depth extending from 11-1 o'clock, deepest @ 12 04/28/25 1032   Drainage Amount Small 04/28/25 1032   Drainage Description Serosanguineous;Yellow 04/28/25 1032   Lavern-wound Assessment Intact;Callus 04/28/25 1032   Wound packed? No 04/07/25 1027   Dressing Status Removed 04/28/25 1032       Wound 12/30/24 Diabetic Ulcer Toe D3, third Anterior;Left (Active)   Wound Image   04/28/25 1032   Enter Parks score: Parks Grade 1: Partial or full-thickness ulcer (superficial) 04/28/25 1032   Wound Description Pink;Yellow 04/28/25 1032   Non-staged Wound Description Full thickness 04/28/25 1032   Wound Length (cm) 0.4 cm 04/28/25 1032   Wound Width (cm) 0.3 cm 04/28/25 1032   Wound Depth (cm) 0.1 cm 04/28/25 1032   Wound Surface Area (cm^2) 0.12 cm^2 04/28/25 1032   Wound Volume (cm^3) 0.012 cm^3 04/28/25 1032   Calculated Wound Volume (cm^3) 0.01 cm^3 04/28/25 1032   Change in Wound Size % 83.33 04/28/25 1032   Number of underminings 1 02/17/25 0955   Undermining 1 0 03/10/25 1053   Undermining 1 is depth extending from resolved 03/10/25 1053   Drainage Amount Scant 04/28/25 1032   Drainage Description Serosanguineous 04/28/25 1032   Lavern-wound Assessment Dry;Scaly 04/28/25 1032   Wound packed? No 04/07/25 1029   Dressing Status Removed 03/31/25 0937       Wound 04/21/25 Other (Comments) Leg Right;Medial (Active)   Wound Image   04/28/25 1032   Wound Description Pink;Epithelialization 04/28/25 1032   Non-staged Wound Description Full thickness 04/28/25 1032   Wound Length (cm) 0.2 cm 04/28/25 1032   Wound Width (cm) 0.2 cm 04/28/25 1032   Wound Depth (cm) 0.1 cm 04/28/25 1032   Wound Surface Area  "(cm^2) 0.04 cm^2 04/28/25 1032   Wound Volume (cm^3) 0.004 cm^3 04/28/25 1032   Calculated Wound Volume (cm^3) 0 cm^3 04/28/25 1032   Change in Wound Size % 100 04/28/25 1032   Drainage Amount Small 04/28/25 1032   Drainage Description Serosanguineous;Brown 04/28/25 1032   Lavern-wound Assessment Intact;Scaly;Dry;Hyperpigmented 04/28/25 1032                 Results from last 6 Months   Lab Units 02/17/25  1118   WOUND CULTURE  2+ Growth of Staphylococcus aureus*         Lab Results   Component Value Date    HGBA1C 7.7 (H) 01/16/2025       Wound Instructions:  Orders Placed This Encounter   Procedures    Splint, Casting, Strapping     This order was created via procedure documentation    Wound cleansing and dressings Diabetic Ulcer Right;Plantar Foot     Shower, yes. Only with a cast cover. Do not get cast wet.        Right Leg Wound:  Apply skin prep to skin under right foot wound.  Apply double 1/4\" offloading felt pad.  Apply Acticoat 7 followed by calcium alginate to the wounds.  Cover with 1/2 ABD to each wound.  Secure with rolled gauze & tape  TCC applied by Dr. Ponce.  Change weekly at the wound center.        Do not walk without the cast boot.   Do not put any objects into the cast to scratch.     If the cast cracks, or you develop severe pain, contact the wound center or go to the nearest ER. (If the wound center is closed)     Standing Status:   Future     Expiration Date:   5/5/2025    Wound cleansing and dressings Right;Medial Leg     Right Leg  Apply Acticoat 7 followed by calcium alginate to the wounds.  Cover with 1/2 ABD to each wound.  Secure with rolled gauze & tape  Change weekly at the wound center with TCC change     Standing Status:   Future     Expiration Date:   5/5/2025    Wound cleansing and dressings Right;Medial Leg     Left 3rd toe Wound:  Apply Acticoat 7 followed by calcium alginate to the wounds.  Cover with 1/2 ABD to each wound.  Secure with rolled gauze & tape  Change 3 x weekly & as " needed for soilage & dislodgment at the wound center.     Standing Status:   Future     Expiration Date:   5/5/2025    Wound Procedure Treatment Diabetic Ulcer Right;Plantar Foot     This order was created via procedure documentation    Wound Procedure Treatment Diabetic Ulcer Anterior;Left Toe D3, third     This order was created via procedure documentation    Wound Procedure Treatment Right;Medial Leg     This order was created via procedure documentation             Satnam Ponce DPM

## 2025-04-28 NOTE — PROGRESS NOTES
"Name: Won Gaitan Jr.      : 1950      MRN: [id number]  Encounter Provider: Pieter Torres DO  Encounter Date: 2025   Encounter department: [facility]  :  Assessment & Plan  Type 2 diabetes mellitus with diabetic neuropathy, unspecified whether long term insulin use (HCC)    Lab Results   Component Value Date    HGBA1C 7.2 (A) 2025     Improving. Needs eye exam.  Shown on parul, Bgs good.  No changes to meds.    Orders:  •  Ambulatory Referral to Ophthalmology; Future  •  POCT hemoglobin A1c           History of Present Illness   A1c. Improved. No changes.    Diabetes  He presents for his follow-up diabetic visit. He has type 2 diabetes mellitus. His disease course has been improving. There are no hypoglycemic associated symptoms. Pertinent negatives for hypoglycemia include no seizures. There are no diabetic associated symptoms. Pertinent negatives for diabetes include no chest pain. Symptoms are stable.     Review of Systems   Constitutional:  Negative for chills and fever.   HENT:  Negative for ear pain and sore throat.    Eyes:  Negative for pain and visual disturbance.   Respiratory:  Negative for cough and shortness of breath.    Cardiovascular:  Negative for chest pain and palpitations.   Gastrointestinal:  Negative for abdominal pain and vomiting.   Genitourinary:  Negative for dysuria and hematuria.   Musculoskeletal:  Negative for arthralgias and back pain.   Skin:  Negative for color change and rash.   Neurological:  Negative for seizures and syncope.   All other systems reviewed and are negative.      Objective   /84   Pulse 75   Temp (!) 96.6 °F (35.9 °C)   Ht 6' 4\" (1.93 m)   Wt 116 kg (256 lb 9.6 oz)   SpO2 98%   BMI 31.23 kg/m²      Physical Exam  Vitals reviewed.   Constitutional:       General: He is not in acute distress.     Appearance: Normal appearance. He is well-developed.   HENT:      Head: Normocephalic and atraumatic.      Nose: " Nose normal. No congestion or rhinorrhea.   Eyes:      Extraocular Movements: Extraocular movements intact.      Conjunctiva/sclera: Conjunctivae normal.      Pupils: Pupils are equal, round, and reactive to light.   Cardiovascular:      Rate and Rhythm: Normal rate and regular rhythm.      Pulses: Normal pulses.      Heart sounds: Normal heart sounds. No murmur heard.  Pulmonary:      Effort: Pulmonary effort is normal. No respiratory distress.      Breath sounds: Normal breath sounds.   Abdominal:      Palpations: Abdomen is soft.      Tenderness: There is no abdominal tenderness.   Musculoskeletal:         General: No swelling.      Cervical back: Neck supple.   Skin:     General: Skin is warm and dry.      Capillary Refill: Capillary refill takes less than 2 seconds.   Neurological:      Mental Status: He is alert.   Psychiatric:         Mood and Affect: Mood normal.       Administrative Statements   I have spent a total time of 32 minutes in caring for this patient on the day of the visit/encounter including Diagnostic results, Prognosis, Risks and benefits of tx options, Instructions for management, Patient and family education, Importance of tx compliance, Risk factor reductions, Impressions, Counseling / Coordination of care, Documenting in the medical record, Reviewing/placing orders in the medical record (including tests, medications, and/or procedures), and Obtaining or reviewing history  .

## 2025-04-28 NOTE — PROGRESS NOTES
Wound Procedure Treatment Diabetic Ulcer Right;Plantar Foot    Performed by: Stephanie White RN  Authorized by: Satnam Ponce DPM  Associated wounds:   Wound 12/16/24 Diabetic Ulcer Foot Right;Plantar    Wound cleansed with:  NSS   Applied primary dressing:  Acticoat and Calcium alginate   Applied secondary dressing:  ABD   Dressing secured with:  Kacy and Tape   Offloading device appllied:  TCC   Comments:  TCC applied by Dr. Ponce   Pre-procedure details:       Sensation:  Unchanged   Wound Procedure Treatment Diabetic Ulcer Anterior;Left Toe D3, third    Performed by: Stephanie White RN  Authorized by: Satnam Ponce DPM  Associated wounds:   Wound 12/30/24 Diabetic Ulcer Toe D3, third Anterior;Left    Wound cleansed with:  NSS   Applied primary dressing:  Calcium alginate, Acticoat and Silver   Applied secondary dressing:  Gauze   Dressing secured with:  Kacy and Tape   Wound Procedure Treatment Right;Medial Leg    Performed by: Stephanie White RN  Authorized by: Satnam Ponce DPM  Associated wounds:   Wound 04/21/25 Other (Comments) Leg Right;Medial    Wound cleansed with:  NSS   Applied primary dressing:  Calcium alginate, Acticoat and Silver   Applied secondary dressing:  ABD   Dressing secured with:  Kacy and Tape   Offloading device appllied:  TCC   Comments:  TCC applied by Dr. Ponce.   Pre-procedure details:       Sensation:  Unchanged

## 2025-04-28 NOTE — ASSESSMENT & PLAN NOTE
Lab Results   Component Value Date    HGBA1C 7.2 (A) 04/28/2025     Improving. Needs eye exam.  Shown on parul, Bgs good.  No changes to meds.    Orders:  •  Ambulatory Referral to Ophthalmology; Future  •  POCT hemoglobin A1c

## 2025-05-05 ENCOUNTER — OFFICE VISIT (OUTPATIENT)
Dept: WOUND CARE | Facility: CLINIC | Age: 75
End: 2025-05-05
Payer: COMMERCIAL

## 2025-05-05 ENCOUNTER — VBI (OUTPATIENT)
Dept: ADMINISTRATIVE | Facility: OTHER | Age: 75
End: 2025-05-05

## 2025-05-05 VITALS
SYSTOLIC BLOOD PRESSURE: 184 MMHG | RESPIRATION RATE: 18 BRPM | DIASTOLIC BLOOD PRESSURE: 88 MMHG | TEMPERATURE: 98.1 F | HEART RATE: 89 BPM

## 2025-05-05 DIAGNOSIS — L97.412 ULCER OF HEEL AND MIDFOOT, RIGHT, WITH FAT LAYER EXPOSED (HCC): Primary | ICD-10-CM

## 2025-05-05 DIAGNOSIS — E11.40 TYPE 2 DIABETES MELLITUS WITH DIABETIC NEUROPATHY, UNSPECIFIED WHETHER LONG TERM INSULIN USE (HCC): ICD-10-CM

## 2025-05-05 DIAGNOSIS — I87.311 CHRONIC VENOUS HYPERTENSION (IDIOPATHIC) WITH ULCER OF RIGHT LOWER EXTREMITY (CODE) (HCC): ICD-10-CM

## 2025-05-05 DIAGNOSIS — L97.521 ULCER OF TOE OF LEFT FOOT, LIMITED TO BREAKDOWN OF SKIN (HCC): ICD-10-CM

## 2025-05-05 PROCEDURE — 99213 OFFICE O/P EST LOW 20 MIN: CPT | Performed by: PODIATRIST

## 2025-05-05 PROCEDURE — 29445 APPL RIGID TOT CNTC LEG CAST: CPT | Performed by: PODIATRIST

## 2025-05-05 RX ORDER — LIDOCAINE 40 MG/G
CREAM TOPICAL ONCE
Status: COMPLETED | OUTPATIENT
Start: 2025-05-05 | End: 2025-05-05

## 2025-05-05 RX ADMIN — LIDOCAINE 1 APPLICATION: 40 CREAM TOPICAL at 11:18

## 2025-05-05 NOTE — PROGRESS NOTES
Patient ID: Won Gaitan Jr. is a 75 y.o. male Date of Birth 1950       Chief Complaint   Patient presents with    Follow Up Wound Care Visit       Allergies:  Simvastatin and Itraconazole    Assessments:      Diagnosis ICD-10-CM Associated Orders   1. Ulcer of heel and midfoot, right, with fat layer exposed ([facility])  L97.412 Wound cleansing and dressings Diabetic Ulcer Right;Plantar Foot     Splint, Casting, Strapping     lidocaine (LMX) 4 % cream     Wound Procedure Treatment Diabetic Ulcer Right;Plantar Foot      2. Ulcer of toe of left foot, limited to breakdown of skin ([facility])  L97.521 lidocaine (LMX) 4 % cream     Wound cleansing and dressings Diabetic Ulcer Anterior;Left Toe D3, third     Wound Procedure Treatment Diabetic Ulcer Anterior;Left Toe D3, third      3. Type 2 diabetes mellitus with diabetic neuropathy, unspecified whether long term insulin use ([facility])  E11.40 Splint, Casting, Strapping      4. Chronic venous hypertension (idiopathic) with ulcer of right lower extremity (CODE) ([facility])  I87.311              Plan:  Reviewed medical records.  Patient was counseled and educated on the condition and the diagnosis.   2.  The diagnosis, treatment options and prognosis were discussed with the patient.    3.   Right foot wound is calderon well.   Will continue TCC.  TCC applied per protocol. Pt to pay attention for any pressure on his wound on left 3rd toe.  Stressed on pt compliance about proper off-loading and staying off of his feet.    4.  RA in 1 week.         Imaging: I have personally reviewed pertinent films in PACS  Labs, pathology, and Other Studies: I have personally reviewed pertinent reports.        Splint, Casting, Strapping    Date/Time: 5/5/2025 10:30 AM    Performed by: Satnam Ponce DPM  Authorized by: Satnam Ponce DPM    Verbal consent obtained?: Yes    Risks and benefits: Risks, benefits and alternatives were discussed    Consent given by:  Patient  Time Out:     Time out: Immediately prior  to the procedure a time out was called      Time out performed at:  5/5/2025 11:07 AM  Patient states understanding of procedure being performed: Yes    Patient identity confirmed:  Verbally with patient  Procedure details:     Laterality:  Right    Location:  Foot    Foot:  R foot    Cast type:  Total contact    Splint composition: static      Supplies:  Cotton padding, sling, fiberglass, elastic bandage and skin protective strip  Post-procedure details:     Patient tolerance of procedure:  Tolerated well, no immediate complications         Subjective:     HPI  The patient presents for evaluation and treatment of wounds.  Tolerated TCC well.  No pain in his feet.  No new complaint.      The following portions of the patient's history were reviewed and updated as appropriate:   Patient Active Problem List   Diagnosis    Hyperlipidemia    Type 2 diabetes mellitus with diabetic neuropathy ([facility])    Atrial fibrillation ([facility])    Right middle lobe pulmonary nodule    Type 2 diabetes mellitus with other skin ulcer (CODE) ([facility])    Essential hypertension    Benign prostatic hyperplasia with lower urinary tract symptoms    Microalbuminuria    Ulcer of right foot with fat layer exposed ([facility])    Ambulatory dysfunction    S/P transmetatarsal amputation of foot, right ([facility])    Sleep disturbance    Anxiety and depression    S/P R ALT flap to right foot    Gait abnormality    Vasomotor rhinitis    Corns and callosities    Tinea unguium    Cellulitis of left lower leg    Puncture wound of left foot with foreign body, subsequent encounter    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 ([facility])    Personal history of COVID-19    Bronchitis    S/P amputation of lesser toe, left ([facility])    Ulcer of toe of left foot, limited to breakdown of skin ([facility])    Medication management    Subconjunctival hemorrhage of right eye    Ulcer of heel and midfoot, right, with fat layer exposed ([facility])  "    Past Medical History:   Diagnosis Date    Arthritis     Atrial fibrillation (HCC)     Diagnosed 11/2018    Benign prostate hyperplasia 04/2002    Cellulitis     right lower leg     Colon polyp 2006    Diabetes mellitus (HCC)     Hearing aid worn     bilat    Hearing loss     90% loss left ear and 40% right ear    History of clubfoot     \"since birth\"    History of pneumonia     History of TIA (transient ischemic attack) 04/25/2017 11/30/18 pt denies    Hyperlipidemia 5/15/2014    Hypertension     Infectious viral hepatitis     \"cant remember type\"    Irregular heart beat     Afib    Neuropathy     both feet    Osteomyelitis (HCC)     right great toe    Pneumonia     Right middle lobe pulmonary nodule 11/6/2018    Seasickness     Teeth missing     Type 2 diabetes mellitus with diabetic neuropathy (HCC) 11/6/2018    Wears glasses     reading    Wound, open     bottom of right foot     Past Surgical History:   Procedure Laterality Date    BUNIONECTOMY Right 12/4/2018    Procedure: 5TH METATARSAL BONE PARTIAL RESECTION, FULL THICKNESS DEBRIDEMENT OF DIABETIC ULCER;  Surgeon: Shala Flynn DPM;  [facility];  Service: Podiatry    CLOSURE DELAYED PRIMARY Left 10/30/2023    Procedure: CLOSURE DELAYED PRIMARY left foot;  Surgeon: Minh Urena DPM;  [facility];  Service: Podiatry    CLUB FOOT RELEASE Bilateral     COLONOSCOPY      COMPLEX WOUND CLOSURE TO EXTREMITY  4/27/2021    Procedure: COMPLEX WOUND CLOSURE TO EXTREMITY: RIGHT FOOT;  Surgeon: Niraj Bojorquez MD;  [facility];  Service: Plastics    EXTERNAL FIXATOR APPLICATION Right 2/12/2021    Procedure: Application multiplane external fixation;  Surgeon: Harry Zamudio DPM;  [facility];  Service: Podiatry    FOOT HARDWARE REMOVAL Right 2/17/2021    Procedure: REMOVAL EXTERNAL FIXATOR;  Surgeon: Harry Zamudio DPM;  [facility];  Service: Podiatry    FOREIGN BODY REMOVAL Right 4/27/2021    Procedure: " REMOVAL FOREIGN BODY EXTREMITY: RIGHT FOOT;  Surgeon: Niraj Bojorquez MD;  [facility];  Service: Plastics    INCISION AND DRAINAGE OF WOUND Right 2/17/2021    Procedure: INCISION AND DRAINAGE (I&D) EXTREMITY;  Surgeon: Harry Zamudio DPM;  [facility];  Service: Podiatry    INCISION AND DRAINAGE OF WOUND Left 10/26/2023    Procedure: INCISION AND DRAINAGE (I&D) EXTREMITY; WASHOUT; REMOVAL OF FOREIGN BODY;  Surgeon: Satnam Ponce DPM;  [facility];  Service: Podiatry    ORIF FOOT FRACTURE Right 3/4/2021    Procedure: VAC PLACEMENT; SCREW FIXATION RIGHT FOOT FUSION;  Surgeon: Harry Zamudio DPM;  [facility];  Service: Podiatry    SC AMPUTATION FOOT TRANSMETARSAL Right 2/22/2021    Procedure: AMPUTATION TRANSMETATARSAL (TMA), REMOVAL NAIL IM T2 ICF HARDWARE;  Surgeon: Harry Zamudio DPM;  [facility];  Service: Podiatry    SC AMPUTATION METATARSAL W/TOE SINGLE Left 11/29/2023    Procedure: LEFT FOURTH RAY RESECTION FOOT;  Surgeon: Harry Zamudio DPM;  [facility];  Service: Podiatry    SC ARTHRD MIDTARSL/TARS MLT/TRANSVRS W/OSTEOT Right 2/12/2021    Procedure: foot ARTHRODESIS/FUSION;  Surgeon: Harry Zamudio DPM;  [facility];  Service: Podiatry    SC DEBRIDEMENT BONE 1ST 20 SQ CM/< Right 12/2/2021    Procedure: DEBRIDEMENT OF TARSAL BONES WITH ANITBIODIC BEADS;  Surgeon: Harry Zamudio DPM;  [facility];  Service: Podiatry    SC LNGTH/SHRT TENDON LEG/ANKLE 1 TENDON SPX Right 2/12/2021    Procedure: LENGTHEN TIBIAL TENDON, TRANS HEEL CORD;  Surgeon: Harry Zamudio DPM;  [facility];  Service: Podiatry    SC MUSC MYOCUTANEOUS/FASCIOCUTANEOUS FLAP TRUNK Right 4/12/2021    Procedure: RECONSTRUCTION MICROSURGICAL W/ FREE FLAP;  Surgeon: Niraj Bojorquez MD;  [facility];  Service: Plastics    SC MUSC MYOCUTANEOUS/FASCIOCUTANEOUS FLAP TRUNK Right 4/12/2021    Procedure: TAKEBACK RECONSTRUCTION  MICROSURGICAL W/ FREE FLAP;  Surgeon: Niraj Bojorquez MD;  [facility];  Service: Plastics    AZ MUSC MYOCUTANEOUS/FASCIOCUTANEOUS FLAP TRUNK Right 4/13/2021    Procedure: RECONSTRUCTION MICROSURGICAL W/ FREE FLAP, RE EXPLORATION, MICRO VASCULAR REVISION;  Surgeon: Niraj Bojorquez MD;  [facility];  Service: Plastics    AZ OSTC PRTL EXOSTC/CONDYLC METAR HEAD Right 12/29/2020    Procedure: EXCISION EXOSTOSIS;  Surgeon: Harry Zamudio DPM;  [facility];  Service: Podiatry    AZ SECONDARY CLOSURE SURG WOUND/DEHSN XTNSV/COMP Right 12/29/2020    Procedure: PRIMARY DELAYED CLOSURE;  Surgeon: Harry Zamudio DPM;  [facility];  Service: Podiatry    TOE AMPUTATION Right     partial great toe    TONSILLECTOMY      VAC DRESSING APPLICATION Right 3/1/2021    Procedure: APPLICATION VAC DRESSING EXTREMITY;  Surgeon: Niraj Bojorquez MD;  [facility];  Service: Plastics    VASECTOMY  1992    WOUND DEBRIDEMENT Right 3/1/2021    Procedure: DEBRIDEMENT LOWER EXTREMITY (WASH OUT);  Surgeon: Niraj Bojorquez MD;  [facility];  Service: Plastics    WOUND DEBRIDEMENT Right 4/7/2021    Procedure: DEBRIDEMENT RIGHT FOOT;  Surgeon: Niraj Bojorquez MD;  [facility];  Service: Plastics    WOUND DEBRIDEMENT Right 4/12/2021    Procedure: DEBRIDEMENT LOWER EXTREMITY (WASH OUT);  Surgeon: Niraj Bojorquez MD;  [facility];  Service: Plastics    WOUND DEBRIDEMENT Right 4/27/2021    Procedure: DEBRIDEMENT LOWER EXTREMITY (WASH OUT): RIGHT FOOT;  Surgeon: Niraj Bojorquez MD;  [facility];  Service: Plastics     Family History   Problem Relation Age of Onset    Diabetes Father         mellitus      Social History     Socioeconomic History    Marital status: /Civil Union     Spouse name: None    Number of children: None    Years of education: None    Highest education level: None   Occupational History     None   Tobacco Use    Smoking status: Former     Current packs/day: 0.00     Types: Cigarettes     Quit date: 1988     Years since quittin.5     Passive exposure: Past    Smokeless tobacco: Former    Tobacco comments:     exposure to passive smoke   Vaping Use    Vaping status: Never Used   Substance and Sexual Activity    Alcohol use: Yes     Comment: occasionally    Drug use: Not Currently    Sexual activity: Yes     Partners: Female   Other Topics Concern    None   Social History Narrative    None     Social Drivers of Health     Financial Resource Strain: Not on file   Food Insecurity: No Food Insecurity (2025)    Nursing - Inadequate Food Risk Classification     Worried About Running Out of Food in the Last Year: Never true     Ran Out of Food in the Last Year: Never true     Ran Out of Food in the Last Year: Never true   Transportation Needs: No Transportation Needs (2025)    Nursing - Transportation Risk Classification     Lack of Transportation: Not on file     Lack of Transportation: No   Physical Activity: Not on file   Stress: Not on file   Social Connections: Not on file   Intimate Partner Violence: Unknown (2025)    Nursing IPS     Feels Physically and Emotionally Safe: Not on file     Physically Hurt by Someone: Not on file     Humiliated or Emotionally Abused by Someone: Not on file     Physically Hurt by Someone: No     Hurt or Threatened by Someone: No   Housing Stability: Unknown (2025)    Nursing: Inadequate Housing Risk Classification     Has Housing: Not on file     Worried About Losing Housing: Not on file     Unable to Get Utilities: Not on file     Unable to Pay for Housing in the Last Year: No     Has Housin        Current Outpatient Medications:     acetaminophen (Mapap Arthritis Pain) 650 mg CR tablet, Take 1 tablet (650 mg total) by mouth 2 (two) times a day (Patient not taking: Reported on 2025), Disp: 60 tablet, Rfl: 1    atorvastatin (LIPITOR)  40 mg tablet, Take 1 tablet (40 mg total) by mouth daily, Disp: 90 tablet, Rfl: 1    clopidogrel (Plavix) 75 mg tablet, Take 1 tablet (75 mg total) by mouth daily, Disp: 90 tablet, Rfl: 1    Continuous Glucose Sensor (FreeStyle Taylor 3 Sensor) MISC, Use 1 each every 14 (fourteen) days, Disp: 1 each, Rfl: 0    diltiazem (CARDIZEM) 120 MG tablet, Take 1 tablet (120 mg total) by mouth every 12 (twelve) hours, Disp: 180 tablet, Rfl: 1    Eliquis 5 MG, Take 1 tablet (5 mg total) by mouth 2 (two) times a day (Patient not taking: Reported on 4/28/2025), Disp: 60 tablet, Rfl: 11    escitalopram (LEXAPRO) 20 mg tablet, Take 1 tablet (20 mg total) by mouth daily, Disp: 90 tablet, Rfl: 0    glucose blood (Accu-Chek Guide) test strip, USE DAILY (Patient not taking: Reported on 2/17/2025), Disp: 100 strip, Rfl: 2    Insulin Glargine Solostar (Lantus SoloStar) 100 UNIT/ML SOPN, Inject 0.3 mL (30 Units total) under the skin daily at bedtime, Disp: 45 mL, Rfl: 1    insulin lispro (HumaLOG) 100 units/mL injection pen, Inject up to 20 Units under the skin 3 (three) times a day with meals, Disp: 15 mL, Rfl: 5    Insulin Pen Needle (Easy Comfort Pen Needles) 33G X 5 MM MISC, USE AS DIRECTED, Disp: 100 each, Rfl: 4    Lancets (onetouch ultrasoft) lancets, Use as instructed (Patient not taking: Reported on 2/17/2025), Disp: 100 each, Rfl: 3    Lancets (onetouch ultrasoft) lancets, Use daily (Patient not taking: Reported on 4/28/2025), Disp: 100 each, Rfl: 3    lisinopril (ZESTRIL) 40 mg tablet, Take 1 tablet (40 mg total) by mouth daily, Disp: 90 tablet, Rfl: 1    metFORMIN (GLUCOPHAGE-XR) 750 mg 24 hr tablet, Take 1 tablet (750 mg total) by mouth 2 (two) times a day with meals, Disp: 180 tablet, Rfl: 1    metoprolol succinate (TOPROL-XL) 100 mg 24 hr tablet, Take 1 tablet (100 mg total) by mouth 2 (two) times a day, Disp: 180 tablet, Rfl: 0    mupirocin (BACTROBAN) 2 % ointment, Apply topically 3 (three) times a day (Patient not taking:  Reported on 2/17/2025), Disp: 15 g, Rfl: 0    promethazine (PHENERGAN) 12.5 mg/10 mL oral solution, Take 10 mL (12.5 mg total) by mouth every 6 (six) hours as needed (cough), Disp: 118 mL, Rfl: 0    tamsulosin (FLOMAX) 0.4 mg, Take 1 capsule (0.4 mg total) by mouth daily with dinner, Disp: 90 capsule, Rfl: 1  No current facility-administered medications for this visit.    Review of Systems   Constitutional:  Negative for chills and fever.   Respiratory:  Negative for shortness of breath.    Cardiovascular:  Negative for chest pain.   Gastrointestinal:  Negative for nausea and vomiting.   Skin:  Positive for wound.   Neurological:  Positive for numbness. Negative for weakness.       Objective:  BP (!) 184/88   Pulse 89   Temp 98.1 °F (36.7 °C)   Resp 18         Physical Exam  Vitals and nursing note reviewed.   Constitutional:       General: He is not in acute distress.     Appearance: He is not toxic-appearing or diaphoretic.   Cardiovascular:      Rate and Rhythm: Normal rate and regular rhythm.      Pulses:           Dorsalis pedis pulses are 1+ on the right side and 1+ on the left side.        Posterior tibial pulses are 0 on the right side and 0 on the left side.   Pulmonary:      Effort: Pulmonary effort is normal. No respiratory distress.   Musculoskeletal:         General: Deformity present. No signs of injury.      Right lower leg: Edema present.      Left lower leg: Edema present.      Right foot: No foot drop.      Left foot: No foot drop.      Comments: S/P partial foot amputation right.   Skin:     General: Skin is warm.      Capillary Refill: Capillary refill takes less than 2 seconds.      Coloration: Skin is not cyanotic or mottled.      Findings: Wound present. No abscess.      Nails: There is no clubbing.      Comments: Wound on left 3rd toe has minimal depth.  Wound bed is granular.  Wound is stable and healing on right plantar foot.  Decrease in size and depth.  Wound in medial right leg is  healed.  No cellulitis. See wound assessment and photo.   Neurological:      General: No focal deficit present.      Mental Status: He is alert and oriented to person, place, and time.      Cranial Nerves: No cranial nerve deficit.      Sensory: Sensory deficit present.      Motor: No weakness.      Coordination: Coordination normal.   Psychiatric:         Mood and Affect: Mood normal.         Behavior: Behavior normal.         Thought Content: Thought content normal.         Judgment: Judgment normal.             Wound 12/16/24 Diabetic Ulcer Foot Right;Plantar (Active)   Wound Image   05/05/25 1054   Enter Parks score: Parks Grade 1: Partial or full-thickness ulcer (superficial) 05/05/25 1055   Wound Description Pink;Pale 05/05/25 1055   Non-staged Wound Description Full thickness 05/05/25 1055   Wound Length (cm) 0.3 cm 05/05/25 1055   Wound Width (cm) 0.5 cm 05/05/25 1055   Wound Depth (cm) 0.2 cm 05/05/25 1055   Wound Surface Area (cm^2) 0.15 cm^2 05/05/25 1055   Wound Volume (cm^3) 0.03 cm^3 05/05/25 1055   Calculated Wound Volume (cm^3) 0.03 cm^3 05/05/25 1055   Change in Wound Size % 86.36 05/05/25 1055   Number of underminings 1 04/21/25 1015   Undermining 1 0.2 04/28/25 1032   Undermining 1 is depth extending from 11-1 o'clock, deepest @ 12 04/28/25 1032   Drainage Amount Moderate 05/05/25 1055   Drainage Description Serosanguineous;Brown 05/05/25 1055   Lavern-wound Assessment Intact;Callus 05/05/25 1055   Wound packed? No 04/07/25 1027   Dressing Status Removed 04/28/25 1032       Wound 12/30/24 Diabetic Ulcer Toe D3, third Anterior;Left (Active)   Wound Image   05/05/25 1100   Enter Parks score: Parks Grade 1: Partial or full-thickness ulcer (superficial) 05/05/25 1100   Wound Description Pink;Yellow 05/05/25 1100   Non-staged Wound Description Full thickness 05/05/25 1100   Wound Length (cm) 0.5 cm 05/05/25 1100   Wound Width (cm) 0.6 cm 05/05/25 1100   Wound Depth (cm) 0.1 cm 05/05/25 1100   Wound  Surface Area (cm^2) 0.3 cm^2 05/05/25 1100   Wound Volume (cm^3) 0.03 cm^3 05/05/25 1100   Calculated Wound Volume (cm^3) 0.03 cm^3 05/05/25 1100   Change in Wound Size % 50 05/05/25 1100   Number of underminings 1 02/17/25 0955   Undermining 1 0 03/10/25 1053   Undermining 1 is depth extending from resolved 03/10/25 1053   Drainage Amount Small 05/05/25 1100   Drainage Description Serosanguineous 05/05/25 1100   Lavern-wound Assessment Dry;Scaly 05/05/25 1100   Wound packed? No 04/07/25 1029   Dressing Status Removed 03/31/25 0937       Wound 04/21/25 Other (Comments) Leg Right;Medial (Active)   Wound Image   05/05/25 1054   Wound Description Pink;Epithelialization 05/05/25 1055   Non-staged Wound Description Not applicable 05/05/25 1055   Wound Length (cm) 0 cm 05/05/25 1055   Wound Width (cm) 0 cm 05/05/25 1055   Wound Depth (cm) 0 cm 05/05/25 1055   Wound Surface Area (cm^2) 0 cm^2 05/05/25 1055   Wound Volume (cm^3) 0 cm^3 05/05/25 1055   Calculated Wound Volume (cm^3) 0 cm^3 05/05/25 1055   Change in Wound Size % 100 05/05/25 1055   Drainage Amount Scant 05/05/25 1055   Drainage Description Brown 05/05/25 1055   Lavern-wound Assessment Intact;Scaly;Dry;Hyperpigmented 05/05/25 1055                              Results from last 6 Months   Lab Units 02/17/25  1118   WOUND CULTURE  2+ Growth of Staphylococcus aureus*         Lab Results   Component Value Date    HGBA1C 7.2 (A) 04/28/2025       Wound Instructions:  Orders Placed This Encounter   Procedures    Wound cleansing and dressings Diabetic Ulcer Right;Plantar Foot     TCC applied by Dr. Ponce.  Change weekly at the wound center.        Do not walk without the cast boot.   Do not put any objects into the cast to scratch.     If the cast cracks, or you develop severe pain, contact the wound center or go to the nearest ER. (If the wound center is closed)     Standing Status:   Future     Expected Date:   5/5/2025     Expiration Date:   5/12/2025    Splint, Casting,  Strapping     This order was created via procedure documentation    Wound cleansing and dressings Diabetic Ulcer Anterior;Left Toe D3, third     Left 3rd toe Wound:    Apply Acticoat 7 followed by calcium alginate to the wounds.  Cover with 1/2 ABD to each wound.  Secure with rolled gauze & tape  Change 3 x weekly & as needed for soilage & dislodgment at the wound center.    Watch for signs of infection these include a fever of 100.4°F (38°C) or higher, chills  Signs of wound infection include increasing swelling, redness, warmth, pain around the wound. Foul smell coming from wound  Go to the closest Emergency Room with signs of infection to be evaluated.     Avoid picking or scratching the wound, this could damage fragile new skin cells      Follow up in 1 weeks     Standing Status:   Future     Expected Date:   5/5/2025     Expiration Date:   5/12/2025    Wound Procedure Treatment Diabetic Ulcer Right;Plantar Foot     This order was created via procedure documentation    Wound Procedure Treatment Diabetic Ulcer Anterior;Left Toe D3, third     This order was created via procedure documentation             Satnam Ponce DPM

## 2025-05-05 NOTE — PATIENT INSTRUCTIONS
Orders Placed This Encounter   Procedures    Wound cleansing and dressings Diabetic Ulcer Right;Plantar Foot     TCC applied by Dr. Ponce.  Change weekly at the wound center.        Do not walk without the cast boot.   Do not put any objects into the cast to scratch.     If the cast cracks, or you develop severe pain, contact the wound center or go to the nearest ER. (If the wound center is closed)     Standing Status:   Future     Expected Date:   5/5/2025     Expiration Date:   5/12/2025    Splint, Casting, Strapping     This order was created via procedure documentation    Wound cleansing and dressings Diabetic Ulcer Anterior;Left Toe D3, third     Left 3rd toe Wound:    Apply Acticoat 7 followed by calcium alginate to the wounds.  Cover with 1/2 ABD to each wound.  Secure with rolled gauze & tape  Change 3 x weekly & as needed for soilage & dislodgment at the wound center.    Watch for signs of infection these include a fever of 100.4°F (38°C) or higher, chills  Signs of wound infection include increasing swelling, redness, warmth, pain around the wound. Foul smell coming from wound  Go to the closest Emergency Room with signs of infection to be evaluated.     Avoid picking or scratching the wound, this could damage fragile new skin cells      Follow up in 1 weeks     Standing Status:   Future     Expected Date:   5/5/2025     Expiration Date:   5/12/2025

## 2025-05-05 NOTE — PROGRESS NOTES
Wound Procedure Treatment Diabetic Ulcer Right;Plantar Foot    Performed by: Lanette Faulkner RN  Authorized by: Satnam Ponce DPM  Associated wounds:   Wound 12/16/24 Diabetic Ulcer Foot Right;Plantar    Wound cleansed with:  Soap and water and NSS   Applied to periwound:  Skin prep   Applied primary dressing:  Acticoat and Calcium alginate   Applied secondary dressing:  Superabsorber and Cast padding   Dressing secured with:  Kacy and Tape   Offloading device appllied:  TCC and Felt padding 1/4 inch   Comments:  Acticoat 7, 2 layers of cast padding   Wound Procedure Treatment Diabetic Ulcer Anterior;Left Toe D3, third    Performed by: Lanette Faulkner RN  Authorized by: Satnma Ponce DPM  Associated wounds:   Wound 12/30/24 Diabetic Ulcer Toe D3, third Anterior;Left    Wound cleansed with:  NSS   Applied primary dressing:  Acticoat   Applied secondary dressing:  Gauze   Dressing secured with:  Tape and Surgilast   Comments:  Acticoat 7

## 2025-05-05 NOTE — TELEPHONE ENCOUNTER
05/05/25 7:45 AM     Chart reviewed for Diabetic Eye Exam ; nothing is submitted to the patient's insurance at this time.     Nicole Muir MA   [facility]

## 2025-05-12 ENCOUNTER — OFFICE VISIT (OUTPATIENT)
Dept: WOUND CARE | Facility: CLINIC | Age: 75
End: 2025-05-12
Payer: COMMERCIAL

## 2025-05-12 VITALS
DIASTOLIC BLOOD PRESSURE: 96 MMHG | TEMPERATURE: 98.2 F | RESPIRATION RATE: 16 BRPM | SYSTOLIC BLOOD PRESSURE: 172 MMHG | HEART RATE: 92 BPM

## 2025-05-12 DIAGNOSIS — L97.412 ULCER OF HEEL AND MIDFOOT, RIGHT, WITH FAT LAYER EXPOSED (HCC): Primary | ICD-10-CM

## 2025-05-12 DIAGNOSIS — E11.40 TYPE 2 DIABETES MELLITUS WITH DIABETIC NEUROPATHY, UNSPECIFIED WHETHER LONG TERM INSULIN USE (HCC): ICD-10-CM

## 2025-05-12 DIAGNOSIS — L97.521 ULCER OF TOE OF LEFT FOOT, LIMITED TO BREAKDOWN OF SKIN (HCC): ICD-10-CM

## 2025-05-12 PROCEDURE — 29445 APPL RIGID TOT CNTC LEG CAST: CPT | Performed by: PODIATRIST

## 2025-05-12 PROCEDURE — 99213 OFFICE O/P EST LOW 20 MIN: CPT | Performed by: PODIATRIST

## 2025-05-12 NOTE — PATIENT INSTRUCTIONS
"Orders Placed This Encounter   Procedures    Splint, Casting, Strapping     This order was created via procedure documentation    Wound cleansing and dressings Diabetic Ulcer Anterior;Left Toe D3, third     Shower, No. Do note get dressing wet. Sponge bathe only.          Left 3rd toe Wound:  Apply xeroform to the wound.  Cover with dry gauze.   Secure with rolled gauze & tape  Change 3 x weekly & as needed for soilage & dislodgment at the wound center.     Watch for signs of infection these include a fever of 100.4°F (38°C) or higher, chills  Signs of wound infection include increasing swelling, redness, warmth, pain around the wound. Foul smell coming from wound  Go to the closest Emergency Room with signs of infection to be evaluated.            Follow up at the wound center in one week.     Standing Status:   Future     Expiration Date:   5/19/2025    Wound cleansing and dressings Diabetic Ulcer Right;Plantar Foot     Shower. No. Do not get dressing wet. Sponge bateh only.           Right foot wound:  Apply skin prep to heel and then double 1/4\" offloading pad.   Apply acticoat 7 to the wound.   Cover with alginate and half ABD.   Secure with rolled gauze and tape.   TCC applied by Dr. Ponce.  Change dressing weekly at the wound center.              Limit your walking. Do not walk without the cast boot.       Do not put any objects in the cast to scratch.     Contact the wound center if the cast cracks.       Go to the nearest ER if you develop severe pain in your right foot or leg.         Follow up at the wound center in one week.     Standing Status:   Future     Expiration Date:   5/19/2025      "

## 2025-05-12 NOTE — PROGRESS NOTES
Patient ID: Won Gaitan Jr. is a 75 y.o. male Date of Birth 1950       Chief Complaint   Patient presents with    Follow Up Wound Care Visit     BLE wounds/ TCC to RLE       Allergies:  Simvastatin and Itraconazole    Assessments:      Diagnosis ICD-10-CM Associated Orders   1. Ulcer of heel and midfoot, right, with fat layer exposed ([facility])  L97.412 Splint, Casting, Strapping     Wound cleansing and dressings Diabetic Ulcer Right;Plantar Foot     Wound Procedure Treatment Diabetic Ulcer Right;Plantar Foot      2. Ulcer of toe of left foot, limited to breakdown of skin ([facility])  L97.521 Wound cleansing and dressings Diabetic Ulcer Anterior;Left Toe D3, third     Wound Procedure Treatment Diabetic Ulcer Anterior;Left Toe D3, third      3. Type 2 diabetes mellitus with diabetic neuropathy, unspecified whether long term insulin use ([facility])  E11.40 Splint, Casting, Strapping             Plan:  Reviewed medical records.  Patient was counseled and educated on the condition and the diagnosis.   2.  The diagnosis, treatment options and prognosis were discussed with the patient.    3.   Right foot wound is calderon well.   Continue TCC.  TCC applied per protocol. Pt to pay attention for any pressure on his wound on left 3rd toe.  Wound bed is little dry on left 3rd toe.  Use Xeroform.  Stressed on pt compliance about proper off-loading and staying off of his feet.    4.  RA in 1 week.         Imaging: I have personally reviewed pertinent films in PACS  Labs, pathology, and Other Studies: I have personally reviewed pertinent reports.        Splint, Casting, Strapping    Date/Time: 5/12/2025 10:30 AM    Performed by: Satnam Ponce DPM  Authorized by: Satnam Ponce DPM    Verbal consent obtained?: Yes    Risks and benefits: Risks, benefits and alternatives were discussed    Consent given by:  Patient  Time Out:     Time out: Immediately prior to the procedure a time out was called      Time out performed at:  5/12/2025 10:51  AM  Patient states understanding of procedure being performed: Yes    Patient identity confirmed:  Verbally with patient  Procedure details:     Laterality:  Right    Location:  Foot    Foot:  R foot    Cast type:  Total contact    Splint composition: static      Supplies:  Cotton padding, sling, fiberglass, elastic bandage and skin protective strip  Post-procedure details:     Patient tolerance of procedure:  Tolerated well, no immediate complications         Subjective:     HPI  The patient presents for evaluation and treatment of wounds.  Tolerated TCC well.  No pain in his feet.  No new complaint.      The following portions of the patient's history were reviewed and updated as appropriate:   Patient Active Problem List   Diagnosis    Hyperlipidemia    Type 2 diabetes mellitus with diabetic neuropathy ([facility])    Atrial fibrillation ([facility])    Right middle lobe pulmonary nodule    Type 2 diabetes mellitus with other skin ulcer (CODE) ([facility])    Essential hypertension    Benign prostatic hyperplasia with lower urinary tract symptoms    Microalbuminuria    Ulcer of right foot with fat layer exposed ([facility])    Ambulatory dysfunction    S/P transmetatarsal amputation of foot, right ([facility])    Sleep disturbance    Anxiety and depression    S/P R ALT flap to right foot    Gait abnormality    Vasomotor rhinitis    Corns and callosities    Tinea unguium    Cellulitis of left lower leg    Puncture wound of left foot with foreign body, subsequent encounter    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 ([facility])    Personal history of COVID-19    Bronchitis    S/P amputation of lesser toe, left ([facility])    Ulcer of toe of left foot, limited to breakdown of skin ([facility])    Medication management    Subconjunctival hemorrhage of right eye    Ulcer of heel and midfoot, right, with fat layer exposed ([facility])     Past Medical History:   Diagnosis Date    Arthritis     Atrial fibrillation  "(HCC)     Diagnosed 11/2018    Benign prostate hyperplasia 04/2002    Cellulitis     right lower leg     Colon polyp 2006    Diabetes mellitus (HCC)     Hearing aid worn     bilat    Hearing loss     90% loss left ear and 40% right ear    History of clubfoot     \"since birth\"    History of pneumonia     History of TIA (transient ischemic attack) 04/25/2017 11/30/18 pt denies    Hyperlipidemia 5/15/2014    Hypertension     Infectious viral hepatitis     \"cant remember type\"    Irregular heart beat     Afib    Neuropathy     both feet    Osteomyelitis (HCC)     right great toe    Pneumonia     Right middle lobe pulmonary nodule 11/6/2018    Seasickness     Teeth missing     Type 2 diabetes mellitus with diabetic neuropathy (HCC) 11/6/2018    Wears glasses     reading    Wound, open     bottom of right foot     Past Surgical History:   Procedure Laterality Date    BUNIONECTOMY Right 12/4/2018    Procedure: 5TH METATARSAL BONE PARTIAL RESECTION, FULL THICKNESS DEBRIDEMENT OF DIABETIC ULCER;  Surgeon: Shala Flynn DPM;  [facility];  Service: Podiatry    CLOSURE DELAYED PRIMARY Left 10/30/2023    Procedure: CLOSURE DELAYED PRIMARY left foot;  Surgeon: Minh Urena DPM;  [facility];  Service: Podiatry    CLUB FOOT RELEASE Bilateral     COLONOSCOPY      COMPLEX WOUND CLOSURE TO EXTREMITY  4/27/2021    Procedure: COMPLEX WOUND CLOSURE TO EXTREMITY: RIGHT FOOT;  Surgeon: Niraj Bojorquez MD;  [facility];  Service: Plastics    EXTERNAL FIXATOR APPLICATION Right 2/12/2021    Procedure: Application multiplane external fixation;  Surgeon: Harry Zamudio DPM;  [facility];  Service: Podiatry    FOOT HARDWARE REMOVAL Right 2/17/2021    Procedure: REMOVAL EXTERNAL FIXATOR;  Surgeon: Harry Zamudio DPM;  [facility];  Service: Podiatry    FOREIGN BODY REMOVAL Right 4/27/2021    Procedure: REMOVAL FOREIGN BODY EXTREMITY: RIGHT FOOT;  Surgeon: Niraj Bojorquez, " MD;  [facility];  Service: Plastics    INCISION AND DRAINAGE OF WOUND Right 2/17/2021    Procedure: INCISION AND DRAINAGE (I&D) EXTREMITY;  Surgeon: Harry Zamudio DPM;  [facility];  Service: Podiatry    INCISION AND DRAINAGE OF WOUND Left 10/26/2023    Procedure: INCISION AND DRAINAGE (I&D) EXTREMITY; WASHOUT; REMOVAL OF FOREIGN BODY;  Surgeon: Satnam Ponce DPM;  [facility];  Service: Podiatry    ORIF FOOT FRACTURE Right 3/4/2021    Procedure: VAC PLACEMENT; SCREW FIXATION RIGHT FOOT FUSION;  Surgeon: Harry Zamudio DPM;  [facility];  Service: Podiatry    MA AMPUTATION FOOT TRANSMETARSAL Right 2/22/2021    Procedure: AMPUTATION TRANSMETATARSAL (TMA), REMOVAL NAIL IM T2 ICF HARDWARE;  Surgeon: Harry Zamudio DPM;  [facility];  Service: Podiatry    MA AMPUTATION METATARSAL W/TOE SINGLE Left 11/29/2023    Procedure: LEFT FOURTH RAY RESECTION FOOT;  Surgeon: Harry Zamudio DPM;  [facility];  Service: Podiatry    MA ARTHRD MIDTARSL/TARS MLT/TRANSVRS W/OSTEOT Right 2/12/2021    Procedure: foot ARTHRODESIS/FUSION;  Surgeon: Harry Zamudio DPM;  [facility];  Service: Podiatry    MA DEBRIDEMENT BONE 1ST 20 SQ CM/< Right 12/2/2021    Procedure: DEBRIDEMENT OF TARSAL BONES WITH ANITBIODIC BEADS;  Surgeon: aHrry Zamudio DPM;  [facility];  Service: Podiatry    MA LNGTH/SHRT TENDON LEG/ANKLE 1 TENDON SPX Right 2/12/2021    Procedure: LENGTHEN TIBIAL TENDON, TRANS HEEL CORD;  Surgeon: Harry Zamudio DPM;  [facility];  Service: Podiatry    MA MUSC MYOCUTANEOUS/FASCIOCUTANEOUS FLAP TRUNK Right 4/12/2021    Procedure: RECONSTRUCTION MICROSURGICAL W/ FREE FLAP;  Surgeon: Niraj Bojorquez MD;  [facility];  Service: Plastics    MA MUSC MYOCUTANEOUS/FASCIOCUTANEOUS FLAP TRUNK Right 4/12/2021    Procedure: TAKEBACK RECONSTRUCTION MICROSURGICAL W/ FREE FLAP;  Surgeon: Niraj Bojorquez MD;  [facility];   Service: Plastics    NE MUSC MYOCUTANEOUS/FASCIOCUTANEOUS FLAP TRUNK Right 4/13/2021    Procedure: RECONSTRUCTION MICROSURGICAL W/ FREE FLAP, RE EXPLORATION, MICRO VASCULAR REVISION;  Surgeon: Niraj Bojorquez MD;  [facility];  Service: Plastics    NE OSTC PRTL EXOSTC/CONDYLC METAR HEAD Right 12/29/2020    Procedure: EXCISION EXOSTOSIS;  Surgeon: Harry Zamudio DPM;  [facility];  Service: Podiatry    NE SECONDARY CLOSURE SURG WOUND/DEHSN XTNSV/COMP Right 12/29/2020    Procedure: PRIMARY DELAYED CLOSURE;  Surgeon: Harry Zamudio DPM;  [facility];  Service: Podiatry    TOE AMPUTATION Right     partial great toe    TONSILLECTOMY      VAC DRESSING APPLICATION Right 3/1/2021    Procedure: APPLICATION VAC DRESSING EXTREMITY;  Surgeon: Niraj Bojorquez MD;  [facility];  Service: Plastics    VASECTOMY  1992    WOUND DEBRIDEMENT Right 3/1/2021    Procedure: DEBRIDEMENT LOWER EXTREMITY (WASH OUT);  Surgeon: Niraj Bojorquez MD;  [facility];  Service: Plastics    WOUND DEBRIDEMENT Right 4/7/2021    Procedure: DEBRIDEMENT RIGHT FOOT;  Surgeon: Niraj Bojorquez MD;  [facility];  Service: Plastics    WOUND DEBRIDEMENT Right 4/12/2021    Procedure: DEBRIDEMENT LOWER EXTREMITY (WASH OUT);  Surgeon: Niraj Bojorquez MD;  [facility];  Service: Plastics    WOUND DEBRIDEMENT Right 4/27/2021    Procedure: DEBRIDEMENT LOWER EXTREMITY (WASH OUT): RIGHT FOOT;  Surgeon: Niraj Bojorquez MD;  [facility];  Service: Plastics     Family History   Problem Relation Age of Onset    Diabetes Father         mellitus      Social History     Socioeconomic History    Marital status: /Civil Union     Spouse name: None    Number of children: None    Years of education: None    Highest education level: None   Occupational History    None   Tobacco Use    Smoking status: Former     Current packs/day: 0.00     Types:  Cigarettes     Quit date: 1988     Years since quittin.5     Passive exposure: Past    Smokeless tobacco: Former    Tobacco comments:     exposure to passive smoke   Vaping Use    Vaping status: Never Used   Substance and Sexual Activity    Alcohol use: Yes     Comment: occasionally    Drug use: Not Currently    Sexual activity: Yes     Partners: Female   Other Topics Concern    None   Social History Narrative    None     Social Drivers of Health     Financial Resource Strain: Not on file   Food Insecurity: No Food Insecurity (2025)    Nursing - Inadequate Food Risk Classification     Worried About Running Out of Food in the Last Year: Never true     Ran Out of Food in the Last Year: Never true     Ran Out of Food in the Last Year: Never true   Transportation Needs: No Transportation Needs (2025)    Nursing - Transportation Risk Classification     Lack of Transportation: Not on file     Lack of Transportation: No   Physical Activity: Not on file   Stress: Not on file   Social Connections: Not on file   Intimate Partner Violence: Unknown (2025)    Nursing IPS     Feels Physically and Emotionally Safe: Not on file     Physically Hurt by Someone: Not on file     Humiliated or Emotionally Abused by Someone: Not on file     Physically Hurt by Someone: No     Hurt or Threatened by Someone: No   Housing Stability: Unknown (2025)    Nursing: Inadequate Housing Risk Classification     Has Housing: Not on file     Worried About Losing Housing: Not on file     Unable to Get Utilities: Not on file     Unable to Pay for Housing in the Last Year: No     Has Housin        Current Outpatient Medications:     acetaminophen (Mapap Arthritis Pain) 650 mg CR tablet, Take 1 tablet (650 mg total) by mouth 2 (two) times a day (Patient not taking: Reported on 2025), Disp: 60 tablet, Rfl: 1    atorvastatin (LIPITOR) 40 mg tablet, Take 1 tablet (40 mg total) by mouth daily, Disp: 90 tablet, Rfl: 1     clopidogrel (Plavix) 75 mg tablet, Take 1 tablet (75 mg total) by mouth daily, Disp: 90 tablet, Rfl: 1    Continuous Glucose Sensor (FreeStyle Taylor 3 Sensor) MISC, Use 1 each every 14 (fourteen) days, Disp: 1 each, Rfl: 0    diltiazem (CARDIZEM) 120 MG tablet, Take 1 tablet (120 mg total) by mouth every 12 (twelve) hours, Disp: 180 tablet, Rfl: 1    Eliquis 5 MG, Take 1 tablet (5 mg total) by mouth 2 (two) times a day (Patient not taking: Reported on 4/28/2025), Disp: 60 tablet, Rfl: 11    escitalopram (LEXAPRO) 20 mg tablet, Take 1 tablet (20 mg total) by mouth daily, Disp: 90 tablet, Rfl: 0    glucose blood (Accu-Chek Guide) test strip, USE DAILY (Patient not taking: Reported on 2/17/2025), Disp: 100 strip, Rfl: 2    Insulin Glargine Solostar (Lantus SoloStar) 100 UNIT/ML SOPN, Inject 0.3 mL (30 Units total) under the skin daily at bedtime, Disp: 45 mL, Rfl: 1    insulin lispro (HumaLOG) 100 units/mL injection pen, Inject up to 20 Units under the skin 3 (three) times a day with meals, Disp: 15 mL, Rfl: 5    Insulin Pen Needle (Easy Comfort Pen Needles) 33G X 5 MM MISC, USE AS DIRECTED, Disp: 100 each, Rfl: 4    Lancets (onetouch ultrasoft) lancets, Use as instructed (Patient not taking: Reported on 2/17/2025), Disp: 100 each, Rfl: 3    Lancets (onetouch ultrasoft) lancets, Use daily (Patient not taking: Reported on 4/28/2025), Disp: 100 each, Rfl: 3    lisinopril (ZESTRIL) 40 mg tablet, Take 1 tablet (40 mg total) by mouth daily, Disp: 90 tablet, Rfl: 1    metFORMIN (GLUCOPHAGE-XR) 750 mg 24 hr tablet, Take 1 tablet (750 mg total) by mouth 2 (two) times a day with meals, Disp: 180 tablet, Rfl: 1    metoprolol succinate (TOPROL-XL) 100 mg 24 hr tablet, Take 1 tablet (100 mg total) by mouth 2 (two) times a day, Disp: 180 tablet, Rfl: 0    mupirocin (BACTROBAN) 2 % ointment, Apply topically 3 (three) times a day (Patient not taking: Reported on 2/17/2025), Disp: 15 g, Rfl: 0    promethazine (PHENERGAN) 12.5 mg/10 mL  oral solution, Take 10 mL (12.5 mg total) by mouth every 6 (six) hours as needed (cough), Disp: 118 mL, Rfl: 0    tamsulosin (FLOMAX) 0.4 mg, Take 1 capsule (0.4 mg total) by mouth daily with dinner, Disp: 90 capsule, Rfl: 1    Review of Systems   Constitutional:  Negative for chills and fever.   Respiratory:  Negative for shortness of breath.    Cardiovascular:  Negative for chest pain.   Gastrointestinal:  Negative for nausea and vomiting.   Skin:  Positive for wound.   Neurological:  Positive for numbness. Negative for weakness.       Objective:  BP (!) 172/96   Pulse 92   Temp 98.2 °F (36.8 °C)   Resp 16   Pain Score: 0-No pain     Physical Exam  Vitals and nursing note reviewed.   Constitutional:       General: He is not in acute distress.     Appearance: He is not toxic-appearing or diaphoretic.   Cardiovascular:      Rate and Rhythm: Normal rate and regular rhythm.      Pulses:           Dorsalis pedis pulses are 1+ on the right side and 1+ on the left side.        Posterior tibial pulses are 0 on the right side and 0 on the left side.   Pulmonary:      Effort: Pulmonary effort is normal. No respiratory distress.   Musculoskeletal:         General: Deformity present. No signs of injury.      Right lower leg: Edema present.      Left lower leg: Edema present.      Right foot: No foot drop.      Left foot: No foot drop.      Comments: S/P partial foot amputation right.   Skin:     General: Skin is warm.      Capillary Refill: Capillary refill takes less than 2 seconds.      Coloration: Skin is not cyanotic or mottled.      Findings: Wound present. No abscess.      Nails: There is no clubbing.      Comments: Wound on left 3rd toe has minimal depth.  Wound bed is granular.  Wound bed is dry.  Right foot ulcer is almost healed.  No cellulitis. See wound assessment and photo.   Neurological:      General: No focal deficit present.      Mental Status: He is alert and oriented to person, place, and time.       Cranial Nerves: No cranial nerve deficit.      Sensory: Sensory deficit present.      Motor: No weakness.      Coordination: Coordination normal.   Psychiatric:         Mood and Affect: Mood normal.         Behavior: Behavior normal.         Thought Content: Thought content normal.         Judgment: Judgment normal.         Wound 12/16/24 Diabetic Ulcer Foot Right;Plantar (Active)   Wound Image   05/12/25 1057   Enter Parks score: Parks Grade 1: Partial or full-thickness ulcer (superficial) 05/12/25 1102   Wound Description Pink;Pale;Epithelialization 05/12/25 1102   Non-staged Wound Description Full thickness 05/12/25 1102   Wound Length (cm) 0.4 cm 05/12/25 1102   Wound Width (cm) 0.3 cm 05/12/25 1102   Wound Depth (cm) 0.1 cm 05/12/25 1102   Wound Surface Area (cm^2) 0.12 cm^2 05/12/25 1102   Wound Volume (cm^3) 0.012 cm^3 05/12/25 1102   Calculated Wound Volume (cm^3) 0.01 cm^3 05/12/25 1102   Change in Wound Size % 95.45 05/12/25 1102   Number of underminings 1 04/21/25 1015   Undermining 1 0 05/12/25 1102   Undermining 1 is depth extending from resolved 05/12/25 1102   Drainage Amount Small 05/12/25 1102   Drainage Description Tan 05/12/25 1102   Lavern-wound Assessment Intact;Callus 05/12/25 1102   Wound packed? No 05/12/25 1102   Dressing Status Removed 04/28/25 1032       Wound 12/30/24 Diabetic Ulcer Toe D3, third Anterior;Left (Active)   Wound Image   05/12/25 1057   Enter Parks score: Parks Grade 1: Partial or full-thickness ulcer (superficial) 05/12/25 1101   Wound Description Pink;Dry 05/12/25 1101   Non-staged Wound Description Full thickness 05/12/25 1101   Wound Length (cm) 0.2 cm 05/12/25 1101   Wound Width (cm) 0.2 cm 05/12/25 1101   Wound Depth (cm) 0.1 cm 05/12/25 1101   Wound Surface Area (cm^2) 0.04 cm^2 05/12/25 1101   Wound Volume (cm^3) 0.004 cm^3 05/12/25 1101   Calculated Wound Volume (cm^3) 0 cm^3 05/12/25 1101   Change in Wound Size % 100 05/12/25 1101   Number of underminings 1  "02/17/25 0955   Undermining 1 0 03/10/25 1053   Undermining 1 is depth extending from resolved 03/10/25 1053   Drainage Amount None 05/12/25 1101   Drainage Description Serosanguineous 05/05/25 1100   Lavern-wound Assessment Dry;Scaly;Intact 05/12/25 1101   Wound packed? No 05/12/25 1101   Dressing Status Removed 03/31/25 0937               Results from last 6 Months   Lab Units 02/17/25  1118   WOUND CULTURE  2+ Growth of Staphylococcus aureus*         Lab Results   Component Value Date    HGBA1C 7.2 (A) 04/28/2025       Wound Instructions:  Orders Placed This Encounter   Procedures    Splint, Casting, Strapping     This order was created via procedure documentation    Wound cleansing and dressings Diabetic Ulcer Anterior;Left Toe D3, third     Shower, No. Do note get dressing wet. Sponge bathe only.          Left 3rd toe Wound:  Apply xeroform to the wound.  Cover with dry gauze.   Secure with rolled gauze & tape  Change 3 x weekly & as needed for soilage & dislodgment at the wound center.     Watch for signs of infection these include a fever of 100.4°F (38°C) or higher, chills  Signs of wound infection include increasing swelling, redness, warmth, pain around the wound. Foul smell coming from wound  Go to the closest Emergency Room with signs of infection to be evaluated.            Follow up at the wound center in one week.     Standing Status:   Future     Expiration Date:   5/19/2025    Wound cleansing and dressings Diabetic Ulcer Right;Plantar Foot     Shower. No. Do not get dressing wet. Sponge bateh only.           Right foot wound:  Apply skin prep to heel and then double 1/4\" offloading pad.   Apply acticoat 7 to the wound.   Cover with alginate and half ABD.   Secure with rolled gauze and tape.   TCC applied by Dr. Ponce.  Change dressing weekly at the wound center.              Limit your walking. Do not walk without the cast boot.       Do not put any objects in the cast to scratch.     Contact the wound " center if the cast cracks.       Go to the nearest ER if you develop severe pain in your right foot or leg.         Follow up at the wound center in one week.     Standing Status:   Future     Expiration Date:   5/19/2025    Wound Procedure Treatment Diabetic Ulcer Anterior;Left Toe D3, third     This order was created via procedure documentation    Wound Procedure Treatment Diabetic Ulcer Right;Plantar Foot     This order was created via procedure documentation             Satnam Ponce DPM

## 2025-05-12 NOTE — PROGRESS NOTES
Wound Procedure Treatment Diabetic Ulcer Right;Plantar Foot    Performed by: Tanner Nielson RN  Authorized by: Satnam Ponce DPM  Associated wounds:   Wound 12/16/24 Diabetic Ulcer Foot Right;Plantar    Wound cleansed with:  NSS   Applied primary dressing:  Acticoat and Calcium alginate   Applied secondary dressing:  ABD   Dressing secured with:  Kacy and Tape   Offloading device appllied:  TCC   Comments:  Acticoat 7

## 2025-05-12 NOTE — PROGRESS NOTES
Wound Procedure Treatment Diabetic Ulcer Anterior;Left Toe D3, third    Performed by: Tanner Nielson RN  Authorized by: Satnam Ponce DPM  Associated wounds:   Wound 12/30/24 Diabetic Ulcer Toe D3, third Anterior;Left    Wound cleansed with:  NSS   Applied primary dressing:  Non adherent contact layer   Applied secondary dressing:  Gauze   Dressing secured with:  Tape   Comments:  Xeroform

## 2025-05-13 ENCOUNTER — OFFICE VISIT (OUTPATIENT)
Age: 75
End: 2025-05-13
Attending: FAMILY MEDICINE
Payer: COMMERCIAL

## 2025-05-13 DIAGNOSIS — H35.372 EPIRETINAL MEMBRANE, LEFT EYE: Primary | ICD-10-CM

## 2025-05-13 DIAGNOSIS — H43.813 POSTERIOR VITREOUS DETACHMENT OF BOTH EYES: ICD-10-CM

## 2025-05-13 DIAGNOSIS — E11.9 TYPE 2 DIABETES MELLITUS WITHOUT RETINOPATHY (HCC): ICD-10-CM

## 2025-05-13 PROCEDURE — 99204 OFFICE O/P NEW MOD 45 MIN: CPT | Performed by: OPHTHALMOLOGY

## 2025-05-13 PROCEDURE — 92134 CPTRZ OPH DX IMG PST SGM RTA: CPT | Performed by: OPHTHALMOLOGY

## 2025-05-13 PROCEDURE — 92250 FUNDUS PHOTOGRAPHY W/I&R: CPT | Performed by: OPHTHALMOLOGY

## 2025-05-13 NOTE — PROGRESS NOTES
Name: Won Gaitan Jr.      : 1950      MRN: [id number]  Encounter Provider: Brooke Roman MD  Encounter Date: 2025   Encounter department: [facility] OPHTHALMOLOGY  :  Assessment & Plan  Epiretinal membrane, left eye  Mild, not visually significant. Discussed options including observation versus PPV/MP and recommend observation at this time. Amsler grid given and call if changes. Monitor.     Orders:    Fundus Photos - OU - Both Eyes    OCT, Retina - OU - Both Eyes    Type 2 diabetes mellitus without retinopathy (HCC)  No diabetic retinopathy on exam, no macular edema, no NV. The importance of proper blood sugar, blood lipids, and blood pressure control for minimizing the risk of vision loss due to retinopathy associated with diabetes mellitus and hypertension was discussed with the patient. Stressed the importance of following up for monitoring and management by a primary care physician.     Lab Results   Component Value Date    HGBA1C 7.2 (A) 2025       Orders:    Ambulatory Referral to Ophthalmology    Posterior vitreous detachment of both eyes  Posterior vitreous detachment with jeffrey ring both eyes: asymptomatic. Negative susu's sign. No retinal tears, breaks, or detachments on dilated examination with scleral depression 360 degrees.  Retinal detachment precautions were discussed with the patient. The patient was instructed to call or return immediately for an increase in flashes of light, floaters (dark spots in vision), or curtaining of the visual field.            Won Gaitan Jr. is a 75 y.o. male who presents for a diabetic eye exam.    T2DM x 15 years.  BS today 220.  A1c: 7.2.  Reports BP tends to run high due to a-fib.    Reports vision doing okay, feels he can see well aside from seeing occasional floaters that 'hang out' in vision.  No flashes.    History obtained from: patient    Review of Systems  Medical History Reviewed by provider this encounter:  Tobacco  Allergies   Meds  Problems  Med Hx  Surg Hx  Fam Hx     .     Past Ocular History: PCIOL OU  Ocular Meds/Drops: none    Base Eye Exam       Visual Acuity (Snellen - Linear)         Right Left    Dist sc 20/20- 20/20-              Tonometry (Tonopen, 2:22 PM)         Right Left    Pressure 15 15              Pupils         Pupils    Right PERRL    Left PERRL              Visual Fields         Left Right     Full Full              Extraocular Movement         Right Left     Full Full              Neuro/Psych       Oriented x3: Yes    Mood/Affect: Normal              Dilation       Both eyes: 2.5% Phenylephrine, 1% Tropicamide @ 2:22 PM                  Slit Lamp and Fundus Exam       External Exam         Right Left    External Normal Normal              Slit Lamp Exam         Right Left    Lids/Lashes dermatochalasis dermatochalasis    Conjunctiva/Sclera White and quiet White and quiet    Cornea Clear Clear    Anterior Chamber Deep and quiet Deep and quiet    Iris Round and reactive; nevi Round and reactive    Lens PCIOL PCIOL    Anterior Vitreous PVD PVD              Fundus Exam         Right Left    Disc sharp, no pallor sharp, no pallor    C/D Ratio 0.25 0.25    Macula flat/no heme / good foveal reflex flat/no heme / good foveal reflex    Vessels normal in caliber normal in caliber    Periphery flat, no retinal tear or detachment flat, no retinal tear or detachment                      IMAGING:  Fundus Photos - OU - Both Eyes          Right Eye  Disc findings include normal observations. Macula findings include normal observations. Vessel findings include normal observations. Periphery findings include normal observations.     Left Eye  Disc findings include normal observations. Macula findings include epiretinal membrane. Vessel findings include normal observations. Periphery findings include normal observations.          OCT, Retina - OU - Both Eyes          Right Eye  Quality was good. Findings include PVD.     Left  Eye  Quality was good. Findings include epiretinal membrane, PVD.

## 2025-05-13 NOTE — LETTER
May 13, 2025    No Recipients     Patient: Won Gaitan Jr.   YOB: 1950   Date of Visit: 5/13/2025       Dear  [unfilled]:    Thank you for referring Won Gaitan to me for evaluation. Here is my assessment and plan of care:    Assessment:       Plan:       Below you will find my full exam findings. If you have questions, please do not hesitate to call me. I look forward to following Won along with you.         Sincerely,        Brooke Roman MD        CC: No Recipients        Slit Lamp and Fundus Exam       External Exam         Right Left    External Normal Normal              Slit Lamp Exam         Right Left    Lids/Lashes Normal Normal    Conjunctiva/Sclera White and quiet White and quiet    Cornea Clear Clear    Anterior Chamber Deep and quiet Deep and quiet    Iris Round and reactive Round and reactive    Anterior Vitreous No PVD, clear, no cell No PVD, clear, no cell              Fundus Exam         Right Left    Disc sharp, no pallor sharp, no pallor    Macula flat/no heme / good foveal reflex flat/no heme / good foveal reflex    Vessels normal in caliber normal in caliber    Periphery flat, no retinal tear or detachment flat, no retinal tear or detachment

## 2025-05-13 NOTE — ASSESSMENT & PLAN NOTE
No diabetic retinopathy on exam, no macular edema, no NV. The importance of proper blood sugar, blood lipids, and blood pressure control for minimizing the risk of vision loss due to retinopathy associated with diabetes mellitus and hypertension was discussed with the patient. Stressed the importance of following up for monitoring and management by a primary care physician.     Lab Results   Component Value Date    HGBA1C 7.2 (A) 04/28/2025       Orders:    Ambulatory Referral to Ophthalmology

## 2025-05-14 ENCOUNTER — TELEMEDICINE (OUTPATIENT)
Dept: FAMILY MEDICINE CLINIC | Facility: CLINIC | Age: 75
End: 2025-05-14

## 2025-05-14 DIAGNOSIS — Z79.899 MEDICATION MANAGEMENT: ICD-10-CM

## 2025-05-14 DIAGNOSIS — Z97.8 USES SELF-APPLIED CONTINUOUS GLUCOSE MONITORING DEVICE: ICD-10-CM

## 2025-05-14 DIAGNOSIS — E11.40 TYPE 2 DIABETES MELLITUS WITH DIABETIC NEUROPATHY, WITHOUT LONG-TERM CURRENT USE OF INSULIN (HCC): ICD-10-CM

## 2025-05-14 DIAGNOSIS — Z79.4 TYPE 2 DIABETES MELLITUS WITH DIABETIC NEUROPATHY, WITH LONG-TERM CURRENT USE OF INSULIN (HCC): Primary | ICD-10-CM

## 2025-05-14 DIAGNOSIS — E11.40 TYPE 2 DIABETES MELLITUS WITH DIABETIC NEUROPATHY, WITH LONG-TERM CURRENT USE OF INSULIN (HCC): Primary | ICD-10-CM

## 2025-05-14 PROCEDURE — PBNCHG PB NO CHARGE PLACEHOLDER: Performed by: PHARMACIST

## 2025-05-14 NOTE — PROGRESS NOTES
[facility] Clinical Integration Pharmacy Services   Juan Manuel Villalobos PharmD     Won Gaitan Jr. is a 75 y.o. male who was referred to the clinical pharmacist by Pieter Torres DO for diabetes. Patient presents via telephone for follow-up.      Virtual Care Documentation  Encounter provider John Kerns    The Patient is located at Home and in the following state in which I hold an active license: [place].    The patient was identified by name and date of birth. Won Gaitan Jr. was informed that this is a telemedicine visit and that the visit is being conducted through the [employer] Teams platform. He agrees to proceed.  My office door was closed. No one else was in the room. He acknowledged consent and understanding of privacy and security of the telephone platform. The patient has agreed to participate and understands they can discontinue the visit at any time.       Assessment & Plan  Type 2 diabetes mellitus with diabetic neuropathy, with long-term current use of insulin ([facility])    Lab Results   Component Value Date    HGBA1C 7.2 (A) 04/28/2025    HGBA1C 7.7 (H) 01/16/2025     Glycemic control:   -  Most recent A1c: below goal.   - CGM TIR: below goal; TBR meets meet goal.   - Medications: well tolerated; denies any side effects from current medications.     BG control generally good with current insulin regimen, though showing slight elevation in readings.  Weight gain likely due to reduced activity and increased portion sizes     - Interventions:  Continue current regimen   Avoid late-night snacking  Advised reduced portion sizes, particularly lunch  Uses self-applied continuous glucose monitoring device    Medication management    Follow-up:   Follow-up with pharmacist in 2 months  Next PCP visit: 7/28/2025    - Home Monitoring Records: CGM data.  - Labs: A1c due on or after 7/28/2025    Subjective:     Won Gaitan Jr. is a 75 y.o. male with a history of type 2 diabetes with long term use of insulin.  Diabetes course has been improving with treatment. Denies any issues with his current DM regimen.    Reports ongoing wound on foot with cast for ~ 9 weeks, requiring Podiatry/Wound care follow-up. He has been non-weight bearing  - Experiencing challenges with weight gain due to reduced mobility and increased appetite, larger portions     He expresses frustration with current situation and inability to participate in regular activities  - Lives with wife; has grandchildren who visit  - Maintains sense of humor despite challenges    Recent diabetic eye exam noted to be negative    Review of Systems   Constitutional:  Negative for fatigue.   Eyes:         No blurry vision   Endocrine: Negative for polydipsia, polyphagia and polyuria.        No hypoglycemia   Neurological:  Positive for numbness.   Psychiatric/Behavioral:  Positive for dysphoric mood.      DM Self-Management:  - Self-monitoring: is performed regularly.  He checks glucose levels very frequently using CGM (> 30 scans/day). He uses smartphone as . Patient is connected to clinic [facility] account.   - Hypoglycemic episodes: none recently - History of Level 3/severe hypoglycemic event: none   - Hypoglycemia symptoms: weak, shaky;   symptomatic with glucose levels 80-90  - Treatment of hypoglycemia: discussed treatment   Glucometer: Yes, Brand: Accu-Check Guide Me   CGM: Yes, Brand: FreeStyle Taylor 3     Current DM Regimen:  Humalog 20 units SQ AC  Lantus 30 units SQ HS  Metformin 750 mg BID    DM History:  Health Status: Complex; Goals - A1c: <8%; Time In Range: >70% with TBR <1%: per ADA Guidelines for older adults  Microvascular complications: nephropathy and peripheral neuropathy; S/P transmetatarsal amputation of R foot; S/P amputation of L lesser toe. Followed closely by Podiatry   Cardiovascular complications: none  - Statin: Yes   - ACEi/ARB: Yes    Previous DM medications:   Januvia  Glimepiride  Ozempic (cost)    Cardiometabolic Risk  Factors:  Diabetes Composite Score: 3   Values used to calculate this score:    Points  Metrics       0        Blood Pressure: 172/96       1        Prescribed Statins: Yes                Hemoglobin A1c: 7.2       1        Smokes Tobacco: No       1        Prescribed Aspirin: Yes    - HF: No    - CKD: normal GFR with albuminuria   - Steatotic Liver Disease: No     Drug Safety:  - Thyroid cancer: No  - History of pancreatitis: No    Drug Therapy Problems:  - Medication Adherence: Responsible for medication management: patient and spouse. Patient is compliant all of the time. Takes insulin as scheduled; however, has history of self-adjusting insulin doses.   Barriers to medication use: cost    CGM Data Review:     Device: FreeStyle Taylor 3  Dates: 5/1 - 5/14/2025  Usage: 96%  Average glucose (mg/dL): 188  GMI (approx. lab A1c): 7.8%  Glycemic variability (CV): 27.1%    Glycemic patterns:  post-prandial hyperglycemia, particularly following lunch. Moderate variability.   Hypoglycemia: none       Metric CGM Target   Time above range (TAR) Very High >250 mg/dL 14 <5%   Time above range (TAR) High >180 mg/dL 38 <25%   Time in range (TIR)  mg/dL 48 >70%   Time below range (TBR) Low <70 mg/dL 0 <4%    Time below range (TBR)Very Low <54 mg/dL 0 <1%     More than 72 hours of data was reviewed. Report to be scanned to chart.          Meds/Allergies     Current Outpatient Medications:     acetaminophen (Mapap Arthritis Pain) 650 mg CR tablet, Take 1 tablet (650 mg total) by mouth 2 (two) times a day (Patient not taking: Reported on 2/28/2025), Disp: 60 tablet, Rfl: 1    atorvastatin (LIPITOR) 40 mg tablet, Take 1 tablet (40 mg total) by mouth daily, Disp: 90 tablet, Rfl: 1    clopidogrel (Plavix) 75 mg tablet, Take 1 tablet (75 mg total) by mouth daily, Disp: 90 tablet, Rfl: 1    Continuous Glucose Sensor (FreeStyle Taylor 3 Sensor) MISC, Use 1 each every 14 (fourteen) days, Disp: 1 each, Rfl: 0    diltiazem (CARDIZEM) 120  MG tablet, Take 1 tablet (120 mg total) by mouth every 12 (twelve) hours, Disp: 180 tablet, Rfl: 1    escitalopram (LEXAPRO) 20 mg tablet, Take 1 tablet (20 mg total) by mouth daily, Disp: 90 tablet, Rfl: 0    glucose blood (Accu-Chek Guide) test strip, USE DAILY (Patient not taking: Reported on 5/13/2025), Disp: 100 strip, Rfl: 2    Insulin Glargine Solostar (Lantus SoloStar) 100 UNIT/ML SOPN, Inject 0.3 mL (30 Units total) under the skin daily at bedtime, Disp: 45 mL, Rfl: 1    insulin lispro (HumaLOG) 100 units/mL injection pen, Inject up to 20 Units under the skin 3 (three) times a day with meals, Disp: 15 mL, Rfl: 5    Insulin Pen Needle (Easy Comfort Pen Needles) 33G X 5 MM MISC, USE AS DIRECTED, Disp: 100 each, Rfl: 4    Lancets (onetouch ultrasoft) lancets, Use as instructed (Patient not taking: Reported on 2/17/2025), Disp: 100 each, Rfl: 3    Lancets (onetouch ultrasoft) lancets, Use daily (Patient not taking: Reported on 2/17/2025), Disp: 100 each, Rfl: 3    lisinopril (ZESTRIL) 40 mg tablet, Take 1 tablet (40 mg total) by mouth daily, Disp: 90 tablet, Rfl: 1    metFORMIN (GLUCOPHAGE-XR) 750 mg 24 hr tablet, Take 1 tablet (750 mg total) by mouth 2 (two) times a day with meals, Disp: 180 tablet, Rfl: 1    metoprolol succinate (TOPROL-XL) 100 mg 24 hr tablet, Take 1 tablet (100 mg total) by mouth 2 (two) times a day, Disp: 180 tablet, Rfl: 0    mupirocin (BACTROBAN) 2 % ointment, Apply topically 3 (three) times a day (Patient not taking: Reported on 5/13/2025), Disp: 15 g, Rfl: 0    promethazine (PHENERGAN) 12.5 mg/10 mL oral solution, Take 10 mL (12.5 mg total) by mouth every 6 (six) hours as needed (cough), Disp: 118 mL, Rfl: 0    tamsulosin (FLOMAX) 0.4 mg, Take 1 capsule (0.4 mg total) by mouth daily with dinner, Disp: 90 capsule, Rfl: 1    Allergies   Allergen Reactions    Simvastatin Myalgia    Itraconazole Other (See Comments)     Pt denies knowledge of allergy..       Administrative Statements    Pharmacist Tracking Tool:   Reason For Outreach: Embedded Pharmacist  Demographics:  Intervention Method: Phone  Type of Intervention: Follow-Up  Topics Addressed: Diabetes  Pharmacologic Interventions: N/A  Non-Pharmacologic Interventions: Personal CGM  Time:  Direct Patient Care: 20 mins  Care Coordination: 15 mins  Recommendation Recipient: Patient/Caregiver  Outcome: Accepted    Documentation under collaborative practice agreement. Orders pended for PCP signature if needed.    Juan Manuel Villalobos, PharmD  Clinical Integration Pharmacist  [facility]

## 2025-05-17 PROBLEM — Z97.8 USES SELF-APPLIED CONTINUOUS GLUCOSE MONITORING DEVICE: Status: ACTIVE | Noted: 2025-05-17

## 2025-05-18 NOTE — ASSESSMENT & PLAN NOTE
Lab Results   Component Value Date    HGBA1C 7.2 (A) 04/28/2025    HGBA1C 7.7 (H) 01/16/2025     Glycemic control:   -  Most recent A1c: below goal.   - CGM TIR: below goal; TBR meets meet goal.   - Medications: well tolerated; denies any side effects from current medications.     BG control generally good with current insulin regimen, though showing slight elevation in readings.  Weight gain likely due to reduced activity and increased portion sizes     - Interventions:  Continue current regimen   Avoid late-night snacking  Advised reduced portion sizes, particularly lunch

## 2025-05-19 ENCOUNTER — OFFICE VISIT (OUTPATIENT)
Dept: WOUND CARE | Facility: CLINIC | Age: 75
End: 2025-05-19
Payer: COMMERCIAL

## 2025-05-19 VITALS
TEMPERATURE: 98 F | SYSTOLIC BLOOD PRESSURE: 152 MMHG | RESPIRATION RATE: 16 BRPM | HEART RATE: 83 BPM | DIASTOLIC BLOOD PRESSURE: 88 MMHG

## 2025-05-19 DIAGNOSIS — L97.412 ULCER OF HEEL AND MIDFOOT, RIGHT, WITH FAT LAYER EXPOSED (HCC): Primary | ICD-10-CM

## 2025-05-19 DIAGNOSIS — E11.40 TYPE 2 DIABETES MELLITUS WITH DIABETIC NEUROPATHY, UNSPECIFIED WHETHER LONG TERM INSULIN USE (HCC): ICD-10-CM

## 2025-05-19 DIAGNOSIS — L97.521 ULCER OF TOE OF LEFT FOOT, LIMITED TO BREAKDOWN OF SKIN (HCC): ICD-10-CM

## 2025-05-19 PROCEDURE — 99214 OFFICE O/P EST MOD 30 MIN: CPT | Performed by: PODIATRIST

## 2025-05-19 PROCEDURE — 99213 OFFICE O/P EST LOW 20 MIN: CPT | Performed by: PODIATRIST

## 2025-05-19 RX ORDER — LIDOCAINE 40 MG/G
CREAM TOPICAL ONCE
Status: COMPLETED | OUTPATIENT
Start: 2025-05-19 | End: 2025-05-19

## 2025-05-19 RX ORDER — CEPHALEXIN 500 MG/1
500 CAPSULE ORAL EVERY 6 HOURS SCHEDULED
Qty: 28 CAPSULE | Refills: 0 | Status: SHIPPED | OUTPATIENT
Start: 2025-05-19 | End: 2025-05-26

## 2025-05-19 RX ADMIN — LIDOCAINE: 40 CREAM TOPICAL at 10:44

## 2025-05-19 NOTE — PROGRESS NOTES
"Wound Procedure Treatment Diabetic Ulcer Right;Plantar Foot    Performed by: Stephanie White RN  Authorized by: Satnam Ponce DPM  Associated wounds:   Wound 12/16/24 Diabetic Ulcer Foot Right;Plantar    Wound cleansed with:  Soap and water   Applied primary dressing:  Silver and Calcium alginate   Applied secondary dressing:  ABD   Offloading device appllied:  Felt padding 1/4 inch   Comments:  Triple layer 1/4 inch offloading felt pad applied to heel.     Bulky \"football\" dsg applied - 3 rolls cast padding, 1 roll kerlix,  1 roll coban.   Wound Procedure Treatment Diabetic Ulcer Anterior;Left Toe D3, third    Performed by: Stephanie White RN  Authorized by: Satnam Ponce DPM  Associated wounds:   Wound 12/30/24 Diabetic Ulcer Toe D3, third Anterior;Left    Wound cleansed with:  NSS   Applied primary dressing:  Acticoat   Applied secondary dressing:  Gauze   Comments:  Cast padding applied over left toes to aid in offloading        "

## 2025-05-19 NOTE — PATIENT INSTRUCTIONS
"Orders Placed This Encounter   Procedures    Splint, Casting, Strapping     This order was created via procedure documentation    Wound cleansing and dressings     Shower, No. Do note get dressing wet. Sponge bathe only.    Left 3rd toe Wound:  Apply Acticoat 3 to the wound.  Cover with dry gauze & full abd pad to assist with offloading.  Secure with rolled gauze & tape  Change 3 x weekly & as needed for soilage & dislodgment at the wound center.     Watch for signs of infection these include a fever of 100.4°F (38°C) or higher, chills  Signs of wound infection include increasing swelling, redness, warmth, pain around the wound. Foul smell coming from wound  Go to the closest Emergency Room with signs of infection to be evaluated.               Follow up at the wound center in one week.     Standing Status:   Future     Expiration Date:   5/26/2025    Wound cleansing and dressings Diabetic Ulcer Right;Plantar Foot     Wound cleansing and dressings Diabetic Ulcer Right;Plantar Foot  Shower. No. Do not get dressing wet. Sponge bateh only.            Right foot wound:  Apply skin prep to heel and then triple layer 1/4\" offloading pad.   Apply Silver Alginate to the wound.   Cover with alginate and ABD.   Secure with rolled gauze and tape.    Bulky \"football\" dsg applied.  Change dressing weekly at the wound center.            Go to the nearest ER if you develop severe pain in your right foot or leg.           Follow up at the wound center in one week.     Standing Status:   Future     Expiration Date:   5/26/2025      "

## 2025-05-19 NOTE — PROGRESS NOTES
Patient ID: Won Gaitan Jr. is a 75 y.o. male Date of Birth 1950       Chief Complaint   Patient presents with    Follow Up Wound Care Visit     Follow up visit for wound to right foot.  Pt denies any issues or concerns since last visit.        Allergies:  Simvastatin and Itraconazole    Assessments:      Diagnosis ICD-10-CM Associated Orders   1. Ulcer of heel and midfoot, right, with fat layer exposed ([facility])  L97.412 lidocaine (LMX) 4 % cream     Wound cleansing and dressings     Wound cleansing and dressings Diabetic Ulcer Right;Plantar Foot     Wound Procedure Treatment Diabetic Ulcer Right;Plantar Foot     Wound Procedure Treatment Diabetic Ulcer Anterior;Left Toe D3, third      2. Ulcer of toe of left foot, limited to breakdown of skin ([facility])  L97.521 lidocaine (LMX) 4 % cream     Wound cleansing and dressings     Wound cleansing and dressings Diabetic Ulcer Right;Plantar Foot     cephalexin (KEFLEX) 500 mg capsule     XR foot 3+ vw left     Wound Procedure Treatment Diabetic Ulcer Right;Plantar Foot     Wound Procedure Treatment Diabetic Ulcer Anterior;Left Toe D3, third      3. Type 2 diabetes mellitus with diabetic neuropathy, unspecified whether long term insulin use ([facility])  E11.40 lidocaine (LMX) 4 % cream     Wound cleansing and dressings     Wound cleansing and dressings Diabetic Ulcer Right;Plantar Foot     Wound Procedure Treatment Diabetic Ulcer Right;Plantar Foot     Wound Procedure Treatment Diabetic Ulcer Anterior;Left Toe D3, third             Plan:  Reviewed medical records.  Patient was counseled and educated on the condition and the diagnosis.   2.  The diagnosis, treatment options and prognosis were discussed with the patient.    3.   Will transition to Football dressing on right foot.  He has mildly increased redness in left 3rd toe.  Wound still looks granular without exposed bone.  Repeat X-ray.  Rx:  Keflex to manage bioburden.  Stressed on pt compliance about proper off-loading  and staying off of his feet.    4.  RA in 1 week.         Imaging: I have personally reviewed pertinent films in PACS  Labs, pathology, and Other Studies: I have personally reviewed pertinent reports.        Procedures       Subjective:     HPI  The patient presents for evaluation and treatment of wounds.  Tolerated TCC well.  No pain in his feet.  No new complaint.  BS under control.      The following portions of the patient's history were reviewed and updated as appropriate:   Patient Active Problem List   Diagnosis    Hyperlipidemia    Type 2 diabetes mellitus with diabetic neuropathy ([facility])    Atrial fibrillation ([facility])    Right middle lobe pulmonary nodule    Type 2 diabetes mellitus with other skin ulcer (CODE) ([facility])    Essential hypertension    Benign prostatic hyperplasia with lower urinary tract symptoms    Microalbuminuria    Ulcer of right foot with fat layer exposed ([facility])    Ambulatory dysfunction    S/P transmetatarsal amputation of foot, right ([facility])    Sleep disturbance    Anxiety and depression    S/P R ALT flap to right foot    Gait abnormality    Vasomotor rhinitis    Corns and callosities    Tinea unguium    Cellulitis of left lower leg    Puncture wound of left foot with foreign body, subsequent encounter    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 ([facility])    Personal history of COVID-19    Bronchitis    S/P amputation of lesser toe, left ([facility])    Ulcer of toe of left foot, limited to breakdown of skin ([facility])    Medication management    Subconjunctival hemorrhage of right eye    Ulcer of heel and midfoot, right, with fat layer exposed ([facility])    Uses self-applied continuous glucose monitoring device     Past Medical History:   Diagnosis Date    Arthritis     Atrial fibrillation (HCC)     Diagnosed 11/2018    Benign prostate hyperplasia 04/2002    Cellulitis     right lower leg     Colon polyp 2006    Diabetes mellitus ([facility])     Hearing aid  "worn     bilat    Hearing loss     90% loss left ear and 40% right ear    History of clubfoot     \"since birth\"    History of pneumonia     History of TIA (transient ischemic attack) 04/25/2017 11/30/18 pt denies    Hyperlipidemia 5/15/2014    Hypertension     Infectious viral hepatitis     \"cant remember type\"    Irregular heart beat     Afib    Neuropathy     both feet    Osteomyelitis (HCC)     right great toe    Pneumonia     Right middle lobe pulmonary nodule 11/6/2018    Seasickness     Teeth missing     Type 2 diabetes mellitus with diabetic neuropathy (HCC) 11/6/2018    Wears glasses     reading    Wound, open     bottom of right foot     Past Surgical History:   Procedure Laterality Date    BUNIONECTOMY Right 12/04/2018    Procedure: 5TH METATARSAL BONE PARTIAL RESECTION, FULL THICKNESS DEBRIDEMENT OF DIABETIC ULCER;  Surgeon: Shala Flynn DPM;  [facility];  Service: Podiatry    CATARACT EXTRACTION Bilateral     CLOSURE DELAYED PRIMARY Left 10/30/2023    Procedure: CLOSURE DELAYED PRIMARY left foot;  Surgeon: Minh Urena DPM;  [facility];  Service: Podiatry    CLUB FOOT RELEASE Bilateral     COLONOSCOPY      COMPLEX WOUND CLOSURE TO EXTREMITY  04/27/2021    Procedure: COMPLEX WOUND CLOSURE TO EXTREMITY: RIGHT FOOT;  Surgeon: Niraj Bojorquez MD;  [facility];  Service: Plastics    EXTERNAL FIXATOR APPLICATION Right 02/12/2021    Procedure: Application multiplane external fixation;  Surgeon: Harry Zamudio DPM;  [facility];  Service: Podiatry    FOOT HARDWARE REMOVAL Right 02/17/2021    Procedure: REMOVAL EXTERNAL FIXATOR;  Surgeon: Harry Zamudio DPM;  [facility];  Service: Podiatry    FOREIGN BODY REMOVAL Right 04/27/2021    Procedure: REMOVAL FOREIGN BODY EXTREMITY: RIGHT FOOT;  Surgeon: Niraj Bojorquez MD;  [facility];  Service: Plastics    INCISION AND DRAINAGE OF WOUND Right 02/17/2021    Procedure: INCISION AND " DRAINAGE (I&D) EXTREMITY;  Surgeon: Harry Zamudio DPM;  [facility];  Service: Podiatry    INCISION AND DRAINAGE OF WOUND Left 10/26/2023    Procedure: INCISION AND DRAINAGE (I&D) EXTREMITY; WASHOUT; REMOVAL OF FOREIGN BODY;  Surgeon: Satnam Ponce DPM;  [facility];  Service: Podiatry    ORIF FOOT FRACTURE Right 03/04/2021    Procedure: VAC PLACEMENT; SCREW FIXATION RIGHT FOOT FUSION;  Surgeon: Harry Zamudio DPM;  [facility];  Service: Podiatry    TX AMPUTATION FOOT TRANSMETARSAL Right 02/22/2021    Procedure: AMPUTATION TRANSMETATARSAL (TMA), REMOVAL NAIL IM T2 ICF HARDWARE;  Surgeon: Harry Zamudio DPM;  [facility];  Service: Podiatry    TX AMPUTATION METATARSAL W/TOE SINGLE Left 11/29/2023    Procedure: LEFT FOURTH RAY RESECTION FOOT;  Surgeon: Harry Zamudio DPM;  [facility];  Service: Podiatry    TX ARTHRD MIDTARSL/TARS MLT/TRANSVRS W/OSTEOT Right 02/12/2021    Procedure: foot ARTHRODESIS/FUSION;  Surgeon: Harry Zamudio DPM;  [facility];  Service: Podiatry    TX DEBRIDEMENT BONE 1ST 20 SQ CM/< Right 12/02/2021    Procedure: DEBRIDEMENT OF TARSAL BONES WITH ANITBIODIC BEADS;  Surgeon: Harry Zamudio DPM;  [facility];  Service: Podiatry    TX LNGTH/SHRT TENDON LEG/ANKLE 1 TENDON SPX Right 02/12/2021    Procedure: LENGTHEN TIBIAL TENDON, TRANS HEEL CORD;  Surgeon: Harry Zamudio DPM;  [facility];  Service: Podiatry    TX MUSC MYOCUTANEOUS/FASCIOCUTANEOUS FLAP TRUNK Right 04/12/2021    Procedure: RECONSTRUCTION MICROSURGICAL W/ FREE FLAP;  Surgeon: Niraj Bojorquez MD;  [facility];  Service: Plastics    TX MUSC MYOCUTANEOUS/FASCIOCUTANEOUS FLAP TRUNK Right 04/12/2021    Procedure: TAKEBACK RECONSTRUCTION MICROSURGICAL W/ FREE FLAP;  Surgeon: Niraj Bojorquez MD;  [facility];  Service: Plastics    TX MUSC MYOCUTANEOUS/FASCIOCUTANEOUS FLAP TRUNK Right 04/13/2021    Procedure: RECONSTRUCTION  MICROSURGICAL W/ FREE FLAP, RE EXPLORATION, MICRO VASCULAR REVISION;  Surgeon: Niraj Bojorquez MD;  [facility];  Service: Plastics    CT OSTC PRTL EXOSTC/CONDYLC METAR HEAD Right 2020    Procedure: EXCISION EXOSTOSIS;  Surgeon: Harry Zamudio DPM;  [facility];  Service: Podiatry    CT SECONDARY CLOSURE SURG WOUND/DEHSN XTNSV/COMP Right 2020    Procedure: PRIMARY DELAYED CLOSURE;  Surgeon: Harry Zamudio DPM;  [facility];  Service: Podiatry    TOE AMPUTATION Right     partial great toe    TONSILLECTOMY      VAC DRESSING APPLICATION Right 2021    Procedure: APPLICATION VAC DRESSING EXTREMITY;  Surgeon: Niraj Bojorquez MD;  [facility];  Service: Plastics    VASECTOMY  1992    WOUND DEBRIDEMENT Right 2021    Procedure: DEBRIDEMENT LOWER EXTREMITY (WASH OUT);  Surgeon: Niraj Bojorquez MD;  [facility];  Service: Plastics    WOUND DEBRIDEMENT Right 2021    Procedure: DEBRIDEMENT RIGHT FOOT;  Surgeon: Niraj Bojorquez MD;  [facility];  Service: Plastics    WOUND DEBRIDEMENT Right 2021    Procedure: DEBRIDEMENT LOWER EXTREMITY (WASH OUT);  Surgeon: Niraj Bojorquez MD;  [facility];  Service: Plastics    WOUND DEBRIDEMENT Right 2021    Procedure: DEBRIDEMENT LOWER EXTREMITY (WASH OUT): RIGHT FOOT;  Surgeon: Niraj Bojorquez MD;  [facility];  Service: Plastics     Family History   Problem Relation Age of Onset    Diabetes Father         mellitus      Social History     Socioeconomic History    Marital status: /Civil Union     Spouse name: None    Number of children: None    Years of education: None    Highest education level: None   Occupational History    None   Tobacco Use    Smoking status: Former     Current packs/day: 0.00     Types: Cigarettes     Quit date: 1988     Years since quittin.5     Passive exposure: Past    Smokeless  tobacco: Former    Tobacco comments:     exposure to passive smoke   Vaping Use    Vaping status: Never Used   Substance and Sexual Activity    Alcohol use: Yes     Comment: occasionally    Drug use: Not Currently    Sexual activity: Yes     Partners: Female   Other Topics Concern    None   Social History Narrative    None     Social Drivers of Health     Financial Resource Strain: Not on file   Food Insecurity: No Food Insecurity (2025)    Nursing - Inadequate Food Risk Classification     Worried About Running Out of Food in the Last Year: Never true     Ran Out of Food in the Last Year: Never true     Ran Out of Food in the Last Year: Never true   Transportation Needs: No Transportation Needs (2025)    Nursing - Transportation Risk Classification     Lack of Transportation: Not on file     Lack of Transportation: No   Physical Activity: Not on file   Stress: Not on file   Social Connections: Not on file   Intimate Partner Violence: Unknown (2025)    Nursing IPS     Feels Physically and Emotionally Safe: Not on file     Physically Hurt by Someone: Not on file     Humiliated or Emotionally Abused by Someone: Not on file     Physically Hurt by Someone: No     Hurt or Threatened by Someone: No   Housing Stability: Unknown (2025)    Nursing: Inadequate Housing Risk Classification     Has Housing: Not on file     Worried About Losing Housing: Not on file     Unable to Get Utilities: Not on file     Unable to Pay for Housing in the Last Year: No     Has Housin        Current Outpatient Medications:     cephalexin (KEFLEX) 500 mg capsule, Take 1 capsule (500 mg total) by mouth every 6 (six) hours for 7 days, Disp: 28 capsule, Rfl: 0    acetaminophen (Mapap Arthritis Pain) 650 mg CR tablet, Take 1 tablet (650 mg total) by mouth 2 (two) times a day (Patient not taking: Reported on 2025), Disp: 60 tablet, Rfl: 1    atorvastatin (LIPITOR) 40 mg tablet, Take 1 tablet (40 mg total) by mouth daily,  Disp: 90 tablet, Rfl: 1    clopidogrel (Plavix) 75 mg tablet, Take 1 tablet (75 mg total) by mouth daily, Disp: 90 tablet, Rfl: 1    Continuous Glucose Sensor (FreeStyle Taylor 3 Sensor) MISC, Use 1 each every 14 (fourteen) days, Disp: 1 each, Rfl: 0    diltiazem (CARDIZEM) 120 MG tablet, Take 1 tablet (120 mg total) by mouth every 12 (twelve) hours, Disp: 180 tablet, Rfl: 1    escitalopram (LEXAPRO) 20 mg tablet, Take 1 tablet (20 mg total) by mouth daily, Disp: 90 tablet, Rfl: 0    glucose blood (Accu-Chek Guide) test strip, USE DAILY (Patient not taking: Reported on 5/13/2025), Disp: 100 strip, Rfl: 2    Insulin Glargine Solostar (Lantus SoloStar) 100 UNIT/ML SOPN, Inject 0.3 mL (30 Units total) under the skin daily at bedtime, Disp: 45 mL, Rfl: 1    insulin lispro (HumaLOG) 100 units/mL injection pen, Inject up to 20 Units under the skin 3 (three) times a day with meals, Disp: 15 mL, Rfl: 5    Insulin Pen Needle (Easy Comfort Pen Needles) 33G X 5 MM MISC, USE AS DIRECTED, Disp: 100 each, Rfl: 4    Lancets (onetouch ultrasoft) lancets, Use as instructed (Patient not taking: Reported on 2/17/2025), Disp: 100 each, Rfl: 3    Lancets (onetouch ultrasoft) lancets, Use daily (Patient not taking: Reported on 2/17/2025), Disp: 100 each, Rfl: 3    lisinopril (ZESTRIL) 40 mg tablet, Take 1 tablet (40 mg total) by mouth daily, Disp: 90 tablet, Rfl: 1    metFORMIN (GLUCOPHAGE-XR) 750 mg 24 hr tablet, Take 1 tablet (750 mg total) by mouth 2 (two) times a day with meals, Disp: 180 tablet, Rfl: 1    metoprolol succinate (TOPROL-XL) 100 mg 24 hr tablet, Take 1 tablet (100 mg total) by mouth 2 (two) times a day, Disp: 180 tablet, Rfl: 0    mupirocin (BACTROBAN) 2 % ointment, Apply topically 3 (three) times a day (Patient not taking: Reported on 5/13/2025), Disp: 15 g, Rfl: 0    promethazine (PHENERGAN) 12.5 mg/10 mL oral solution, Take 10 mL (12.5 mg total) by mouth every 6 (six) hours as needed (cough), Disp: 118 mL, Rfl: 0     tamsulosin (FLOMAX) 0.4 mg, Take 1 capsule (0.4 mg total) by mouth daily with dinner, Disp: 90 capsule, Rfl: 1  No current facility-administered medications for this visit.    Review of Systems   Constitutional:  Negative for chills and fever.   Respiratory:  Negative for shortness of breath.    Cardiovascular:  Negative for chest pain.   Gastrointestinal:  Negative for nausea and vomiting.   Skin:  Positive for wound.   Neurological:  Positive for numbness. Negative for weakness.       Objective:  /88   Pulse 83   Temp 98 °F (36.7 °C) (Tympanic)   Resp 16   Pain Score: 0-No pain     Physical Exam  Vitals and nursing note reviewed.   Constitutional:       General: He is not in acute distress.     Appearance: He is not toxic-appearing or diaphoretic.     Cardiovascular:      Rate and Rhythm: Normal rate and regular rhythm.      Pulses:           Dorsalis pedis pulses are 1+ on the right side and 1+ on the left side.        Posterior tibial pulses are 0 on the right side and 0 on the left side.   Pulmonary:      Effort: Pulmonary effort is normal. No respiratory distress.     Musculoskeletal:         General: Deformity present. No signs of injury.      Right lower leg: Edema present.      Left lower leg: Edema present.      Right foot: No foot drop.      Left foot: No foot drop.      Comments: S/P partial foot amputation right.     Skin:     General: Skin is warm.      Capillary Refill: Capillary refill takes less than 2 seconds.      Coloration: Skin is not cyanotic or mottled.      Findings: Wound present. No abscess.      Nails: There is no clubbing.      Comments: Wound on left 3rd toe is granular.  Mildly increased redness and swelling around the wound.  Wound bed is granular.  No drainage on right plantar foot.  See wound assessment and photo.     Neurological:      General: No focal deficit present.      Mental Status: He is alert and oriented to person, place, and time.      Cranial Nerves: No cranial  nerve deficit.      Sensory: Sensory deficit present.      Motor: No weakness.      Coordination: Coordination normal.     Psychiatric:         Mood and Affect: Mood normal.         Behavior: Behavior normal.         Thought Content: Thought content normal.         Judgment: Judgment normal.         Wound 12/16/24 Diabetic Ulcer Foot Right;Plantar (Active)   Wound Image   05/19/25 1039   Enter Parks score: Parks Grade 1: Partial or full-thickness ulcer (superficial) 05/19/25 1039   Wound Description Epithelialization;Pink 05/19/25 1039   Non-staged Wound Description Full thickness 05/19/25 1039   Wound Length (cm) 0.1 cm 05/19/25 1039   Wound Width (cm) 0.1 cm 05/19/25 1039   Wound Depth (cm) 0.1 cm 05/19/25 1039   Wound Surface Area (cm^2) 0.01 cm^2 05/19/25 1039   Wound Volume (cm^3) 0.001 cm^3 05/19/25 1039   Calculated Wound Volume (cm^3) 0 cm^3 05/19/25 1039   Change in Wound Size % 100 05/19/25 1039   Number of underminings 1 04/21/25 1015   Undermining 1 0 05/12/25 1102   Undermining 1 is depth extending from resolved 05/12/25 1102   Drainage Amount Small 05/12/25 1102   Drainage Description Tan 05/12/25 1102   Lavern-wound Assessment Intact;Callus 05/19/25 1039   Wound packed? No 05/12/25 1102   Dressing Status Removed 04/28/25 1032       Wound 12/30/24 Diabetic Ulcer Toe D3, third Anterior;Left (Active)   Wound Image   05/19/25 1039   Enter Parks score: Parks Grade 1: Partial or full-thickness ulcer (superficial) 05/19/25 1039   Wound Description Pink 05/19/25 1039   Non-staged Wound Description Full thickness 05/19/25 1039   Wound Length (cm) 0.6 cm 05/19/25 1039   Wound Width (cm) 0.7 cm 05/19/25 1039   Wound Depth (cm) 0.1 cm 05/19/25 1039   Wound Surface Area (cm^2) 0.33 cm^2 05/19/25 1039   Wound Volume (cm^3) 0.022 cm^3 05/19/25 1039   Calculated Wound Volume (cm^3) 0.04 cm^3 05/19/25 1039   Change in Wound Size % 33.33 05/19/25 1039   Number of underminings 1 02/17/25 0955   Undermining 1 0  "03/10/25 1053   Undermining 1 is depth extending from resolved 03/10/25 1053   Drainage Amount Small 05/19/25 1039   Drainage Description Serosanguineous 05/19/25 1039   Lavern-wound Assessment Dry;Scaly;Intact 05/19/25 1039   Wound packed? No 05/12/25 1101   Dressing Status Removed 05/19/25 1039                              Results from last 6 Months   Lab Units 02/17/25  1118   WOUND CULTURE  2+ Growth of Staphylococcus aureus*         Lab Results   Component Value Date    HGBA1C 7.2 (A) 04/28/2025       Wound Instructions:  Orders Placed This Encounter   Procedures    Wound cleansing and dressings     Shower, No. Do note get dressing wet. Sponge bathe only.    Left 3rd toe Wound:  Apply Acticoat 3 to the wound.  Cover with dry gauze & full abd pad to assist with offloading.  Secure with rolled gauze & tape  Change 3 x weekly & as needed for soilage & dislodgment at the wound center.     Watch for signs of infection these include a fever of 100.4°F (38°C) or higher, chills  Signs of wound infection include increasing swelling, redness, warmth, pain around the wound. Foul smell coming from wound  Go to the closest Emergency Room with signs of infection to be evaluated.               Follow up at the wound center in one week.     Standing Status:   Future     Expiration Date:   5/26/2025    Wound cleansing and dressings Diabetic Ulcer Right;Plantar Foot     Wound cleansing and dressings Diabetic Ulcer Right;Plantar Foot  Shower. No. Do not get dressing wet. Sponge bateh only.            Right foot wound:  Apply skin prep to heel and then triple layer 1/4\" offloading pad.   Apply Silver Alginate to the wound.   Cover with alginate and ABD.   Secure with rolled gauze and tape.    Bulky \"football\" dsg applied.  Change dressing weekly at the wound center.            Go to the nearest ER if you develop severe pain in your right foot or leg.           Follow up at the wound center in one week.     Standing Status:   Future "     Expiration Date:   5/26/2025    Wound Procedure Treatment Diabetic Ulcer Right;Plantar Foot     This order was created via procedure documentation    Wound Procedure Treatment Diabetic Ulcer Anterior;Left Toe D3, third     This order was created via procedure documentation    XR foot 3+ vw left     Standing Status:   Future     Expected Date:   5/19/2025     Expiration Date:   5/19/2029     Scheduling Instructions:      Bring along any outside films relating to this procedure.                   Satnam Ponce DPM

## 2025-05-20 ENCOUNTER — APPOINTMENT (OUTPATIENT)
Dept: RADIOLOGY | Age: 75
End: 2025-05-20
Payer: COMMERCIAL

## 2025-05-20 DIAGNOSIS — L97.521 ULCER OF TOE OF LEFT FOOT, LIMITED TO BREAKDOWN OF SKIN (HCC): ICD-10-CM

## 2025-05-20 PROCEDURE — 73630 X-RAY EXAM OF FOOT: CPT

## 2025-05-28 ENCOUNTER — OFFICE VISIT (OUTPATIENT)
Dept: WOUND CARE | Facility: CLINIC | Age: 75
End: 2025-05-28
Payer: COMMERCIAL

## 2025-05-28 VITALS — DIASTOLIC BLOOD PRESSURE: 70 MMHG | TEMPERATURE: 97.7 F | SYSTOLIC BLOOD PRESSURE: 138 MMHG | HEART RATE: 64 BPM

## 2025-05-28 DIAGNOSIS — L97.521 ULCER OF TOE OF LEFT FOOT, LIMITED TO BREAKDOWN OF SKIN (HCC): ICD-10-CM

## 2025-05-28 DIAGNOSIS — E11.40 TYPE 2 DIABETES MELLITUS WITH DIABETIC NEUROPATHY, UNSPECIFIED WHETHER LONG TERM INSULIN USE (HCC): ICD-10-CM

## 2025-05-28 DIAGNOSIS — L97.412 ULCER OF HEEL AND MIDFOOT, RIGHT, WITH FAT LAYER EXPOSED (HCC): Primary | ICD-10-CM

## 2025-05-28 PROCEDURE — 99213 OFFICE O/P EST LOW 20 MIN: CPT

## 2025-05-28 PROCEDURE — 99214 OFFICE O/P EST MOD 30 MIN: CPT

## 2025-05-28 RX ORDER — LIDOCAINE 40 MG/G
CREAM TOPICAL ONCE
Status: COMPLETED | OUTPATIENT
Start: 2025-05-28 | End: 2025-05-28

## 2025-05-28 RX ADMIN — LIDOCAINE: 40 CREAM TOPICAL at 10:41

## 2025-05-28 NOTE — PROGRESS NOTES
Wound Procedure Treatment Diabetic Ulcer Right;Plantar Foot    Performed by: Cony Jimenez RN  Authorized by: KIRIT Enamorado  Associated wounds:   Wound 12/16/24 Diabetic Ulcer Foot Right;Plantar    Wound cleansed with:  Soap and water   Applied to periwound:  Skin prep   Applied primary dressing:  Calcium alginate and Silver   Applied secondary dressing:  Cast padding   Dressing secured with:  Kerlix, Kacy, Coban and Tape   Comments:  Football dressing: three cast padding, one kacy, one coban   Patient identity confirmed:  Verbally with patient

## 2025-05-28 NOTE — PROGRESS NOTES
"Patient ID: Won Gaitan Jr. is a 75 y.o. male Date of Birth 1950       Chief Complaint   Patient presents with    Follow Up Wound Care Visit     Patient is here for a follow up of his bilateral foot wounds. Denies concerns since last visit.        Allergies:  Simvastatin and Itraconazole    Diagnosis:      Diagnosis ICD-10-CM Associated Orders   1. Ulcer of heel and midfoot, right, with fat layer exposed (Formerly [facility])  L97.412 Wound cleansing and dressings     Wound Procedure Treatment Diabetic Ulcer Right;Plantar Foot     lidocaine (LMX) 4 % cream      2. Ulcer of toe of left foot, limited to breakdown of skin (Formerly [facility])  L97.521 Wound cleansing and dressings     Wound Procedure Treatment Diabetic Ulcer Anterior;Left Toe D3, third     lidocaine (LMX) 4 % cream      3. Type 2 diabetes mellitus with diabetic neuropathy, unspecified whether long term insulin use (Formerly [facility])  E11.40               Assessment & Plan:  Bilateral lower extremity wounds are improved.  Will recommend to continue with current wound management and offloading as per podiatry.  Reviewed left foot x-ray results from 5/20/2025 with patient, per report \" no radiographic evidence of osteomyelitis\".   See wound orders below  Wounds are not showing any clinical s/s of infection  Pressure relief, offload pressure to wound as much as possible. Leg elevation for edema management as patient tolerates.  Counseled patient on the importance of tight glycemic control and adequate protein intake (3-4 servings per day) to promote wound healing.   A1C results reviewed with the patient today.   Follow-up in 1 week(s) with Dr. Ponce. Call sooner with questions or concerns or any signs of infection such as redness, swelling, increased/purulent drainage, fever or chills, and increased pain.  Patient and wife verbalized understanding and agrees with plan of care.          Subjective:   5/28/25: Patient presents to the wound care center for follow-up visit of bilateral lower " "extremity wounds.  He is accompanied today by his wife who is involved in his care.  Patient states that he completed course of antibiotics without any report of adverse reactions.  Patient offers no wound related complaints, denies increased pain or drainage.  No fever or chills.        The following portions of the patient's history were reviewed and updated as appropriate:   Patient Active Problem List  Diagnosis    Hyperlipidemia    Type 2 diabetes mellitus with diabetic neuropathy (Formerly [facility])    Atrial fibrillation ([facility])    Right middle lobe pulmonary nodule    Type 2 diabetes mellitus with other skin ulcer (CODE) (Formerly [facility])    Essential hypertension    Benign prostatic hyperplasia with lower urinary tract symptoms    Microalbuminuria    Ulcer of right foot with fat layer exposed ([facility])    Ambulatory dysfunction    S/P transmetatarsal amputation of foot, right ([facility]ly [facility])    Sleep disturbance    Anxiety and depression    S/P R ALT flap to right foot    Gait abnormality    Vasomotor rhinitis    Corns and callosities    Tinea unguium    Cellulitis of left lower leg    Puncture wound of left foot with foreign body, subsequent encounter    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 ([facility])    Personal history of COVID-19    Bronchitis    S/P amputation of lesser toe, left ([facility])    Ulcer of toe of left foot, limited to breakdown of skin ([facility])    Medication management    Subconjunctival hemorrhage of right eye    Ulcer of heel and midfoot, right, with fat layer exposed ([facility])    Uses self-applied continuous glucose monitoring device     Past Medical History:  Diagnosis Date    Arthritis     Atrial fibrillation ([facility])     Diagnosed 11/2018    Benign prostate hyperplasia 04/2002    Cellulitis     right lower leg     Colon polyp 2006    Diabetes mellitus ([facility])     Hearing aid worn     bilat    Hearing loss     90% loss left ear and 40% right ear    History of clubfoot     \"since " "birth\"    History of pneumonia     History of TIA (transient ischemic attack) 04/25/2017 11/30/18 pt denies    Hyperlipidemia 5/15/2014    Hypertension     Infectious viral hepatitis     \"cant remember type\"    Irregular heart beat     Afib    Neuropathy     both feet    Osteomyelitis (HCC)     right great toe    Pneumonia     Right middle lobe pulmonary nodule 11/6/2018    Seasickness     Teeth missing     Type 2 diabetes mellitus with diabetic neuropathy (HCC) 11/6/2018    Wears glasses     reading    Wound, open     bottom of right foot     Past Surgical History:  Procedure Laterality Date    BUNIONECTOMY Right 12/04/2018    Procedure: 5TH METATARSAL BONE PARTIAL RESECTION, FULL THICKNESS DEBRIDEMENT OF DIABETIC ULCER;  Surgeon: Shala Flynn DPM;  [facility];  Service: Podiatry    CATARACT EXTRACTION Bilateral     CLOSURE DELAYED PRIMARY Left 10/30/2023    Procedure: CLOSURE DELAYED PRIMARY left foot;  Surgeon: Minh Urena DPM;  [facility];  Service: Podiatry    CLUB FOOT RELEASE Bilateral     COLONOSCOPY      COMPLEX WOUND CLOSURE TO EXTREMITY  04/27/2021    Procedure: COMPLEX WOUND CLOSURE TO EXTREMITY: RIGHT FOOT;  Surgeon: Niraj Bojorquez MD;  [facility];  Service: Plastics    EXTERNAL FIXATOR APPLICATION Right 02/12/2021    Procedure: Application multiplane external fixation;  Surgeon: Harry Zamudio DPM;  [facility];  Service: Podiatry    FOOT HARDWARE REMOVAL Right 02/17/2021    Procedure: REMOVAL EXTERNAL FIXATOR;  Surgeon: Harry Zamudio DPM;  [facility];  Service: Podiatry    FOREIGN BODY REMOVAL Right 04/27/2021    Procedure: REMOVAL FOREIGN BODY EXTREMITY: RIGHT FOOT;  Surgeon: Niraj Bojorquez MD;  [facility];  Service: Plastics    INCISION AND DRAINAGE OF WOUND Right 02/17/2021    Procedure: INCISION AND DRAINAGE (I&D) EXTREMITY;  Surgeon: Harry Zamudio DPM;  [facility];  Service: Podiatry    " INCISION AND DRAINAGE OF WOUND Left 10/26/2023    Procedure: INCISION AND DRAINAGE (I&D) EXTREMITY; WASHOUT; REMOVAL OF FOREIGN BODY;  Surgeon: Satnam Ponce DPM;  [facility];  Service: Podiatry    ORIF FOOT FRACTURE Right 03/04/2021    Procedure: VAC PLACEMENT; SCREW FIXATION RIGHT FOOT FUSION;  Surgeon: Harry Zamudio DPM;  [facility];  Service: Podiatry    NM AMPUTATION FOOT TRANSMETARSAL Right 02/22/2021    Procedure: AMPUTATION TRANSMETATARSAL (TMA), REMOVAL NAIL IM T2 ICF HARDWARE;  Surgeon: Harry Zamudio DPM;  [facility];  Service: Podiatry    NM AMPUTATION METATARSAL W/TOE SINGLE Left 11/29/2023    Procedure: LEFT FOURTH RAY RESECTION FOOT;  Surgeon: Harry Zamudio DPM;  [facility];  Service: Podiatry    NM ARTHRD MIDTARSL/TARS MLT/TRANSVRS W/OSTEOT Right 02/12/2021    Procedure: foot ARTHRODESIS/FUSION;  Surgeon: Harry Zamudio DPM;  [facility];  Service: Podiatry    NM DEBRIDEMENT BONE 1ST 20 SQ CM/< Right 12/02/2021    Procedure: DEBRIDEMENT OF TARSAL BONES WITH ANITBIODIC BEADS;  Surgeon: Harry Zamudio DPM;  [facility];  Service: Podiatry    NM LNGTH/SHRT TENDON LEG/ANKLE 1 TENDON SPX Right 02/12/2021    Procedure: LENGTHEN TIBIAL TENDON, TRANS HEEL CORD;  Surgeon: Harry Zamudio DPM;  [facility];  Service: Podiatry    NM MUSC MYOCUTANEOUS/FASCIOCUTANEOUS FLAP TRUNK Right 04/12/2021    Procedure: RECONSTRUCTION MICROSURGICAL W/ FREE FLAP;  Surgeon: Niraj Bojorquez MD;  [facility];  Service: Plastics    NM MUSC MYOCUTANEOUS/FASCIOCUTANEOUS FLAP TRUNK Right 04/12/2021    Procedure: TAKEBACK RECONSTRUCTION MICROSURGICAL W/ FREE FLAP;  Surgeon: Niraj Bojorquez MD;  [facility];  Service: Plastics    NM MUSC MYOCUTANEOUS/FASCIOCUTANEOUS FLAP TRUNK Right 04/13/2021    Procedure: RECONSTRUCTION MICROSURGICAL W/ FREE FLAP, RE EXPLORATION, MICRO VASCULAR REVISION;  Surgeon: Niraj Bojorquez,  MD;  [facility];  Service: Plastics    NV OSTC PRTL EXOSTC/CONDYLC METAR HEAD Right 2020    Procedure: EXCISION EXOSTOSIS;  Surgeon: Harry Zamudio DPM;  [facility];  Service: Podiatry    NV SECONDARY CLOSURE SURG WOUND/DEHSN XTNSV/COMP Right 2020    Procedure: PRIMARY DELAYED CLOSURE;  Surgeon: Harry Zamudio DPM;  [facility];  Service: Podiatry    TOE AMPUTATION Right     partial great toe    TONSILLECTOMY      VAC DRESSING APPLICATION Right 2021    Procedure: APPLICATION VAC DRESSING EXTREMITY;  Surgeon: Niraj Bojorquez MD;  [facility];  Service: Plastics    VASECTOMY  1992    WOUND DEBRIDEMENT Right 2021    Procedure: DEBRIDEMENT LOWER EXTREMITY (WASH OUT);  Surgeon: Niraj Bojorquez MD;  [facility];  Service: Plastics    WOUND DEBRIDEMENT Right 2021    Procedure: DEBRIDEMENT RIGHT FOOT;  Surgeon: Niraj Bojorquez MD;  [facility];  Service: Plastics    WOUND DEBRIDEMENT Right 2021    Procedure: DEBRIDEMENT LOWER EXTREMITY (WASH OUT);  Surgeon: Niraj Bojorquez MD;  [facility];  Service: Plastics    WOUND DEBRIDEMENT Right 2021    Procedure: DEBRIDEMENT LOWER EXTREMITY (WASH OUT): RIGHT FOOT;  Surgeon: Niraj Bojorquez MD;  [facility];  Service: Plastics     Family History  Problem Relation Name Age of Onset    Diabetes Father          mellitus      Social History  Socioeconomic History    Marital status: /Civil Union   Tobacco Use    Smoking status: Former     Current packs/day: 0.00     Types: Cigarettes     Quit date: 1988     Years since quittin.5     Passive exposure: Past    Smokeless tobacco: Former    Tobacco comments:     exposure to passive smoke   Vaping Use    Vaping status: Never Used   Substance and Sexual Activity    Alcohol use: Yes     Comment: occasionally    Drug use: Not Currently    Sexual activity: Yes      Partners: Female     Social Drivers of Health     Food Insecurity: No Food Insecurity (2025)    Nursing - Inadequate Food Risk Classification     Worried About Running Out of Food in the Last Year: Never true     Ran Out of Food in the Last Year: Never true     Ran Out of Food in the Last Year: Never true   Transportation Needs: No Transportation Needs (2025)    Nursing - Transportation Risk Classification     Lack of Transportation: No   Intimate Partner Violence: Unknown (2025)    Nursing IPS     Physically Hurt by Someone: No     Hurt or Threatened by Someone: No   Housing Stability: Unknown (2025)    Nursing: Inadequate Housing Risk Classification     Unable to Pay for Housing in the Last Year: No     Has Housin        Current Outpatient Medications:     acetaminophen (Mapap Arthritis Pain) 650 mg CR tablet, Take 1 tablet (650 mg total) by mouth 2 (two) times a day (Patient not taking: Reported on 2025), Disp: 60 tablet, Rfl: 1    atorvastatin (LIPITOR) 40 mg tablet, Take 1 tablet (40 mg total) by mouth daily, Disp: 90 tablet, Rfl: 1    clopidogrel (Plavix) 75 mg tablet, Take 1 tablet (75 mg total) by mouth daily, Disp: 90 tablet, Rfl: 1    Continuous Glucose Sensor (FreeStyle Taylor 3 Sensor) MISC, Use 1 each every 14 (fourteen) days, Disp: 1 each, Rfl: 0    diltiazem (CARDIZEM) 120 MG tablet, Take 1 tablet (120 mg total) by mouth every 12 (twelve) hours, Disp: 180 tablet, Rfl: 1    escitalopram (LEXAPRO) 20 mg tablet, Take 1 tablet (20 mg total) by mouth daily, Disp: 90 tablet, Rfl: 0    glucose blood (Accu-Chek Guide) test strip, USE DAILY (Patient not taking: Reported on 2025), Disp: 100 strip, Rfl: 2    Insulin Glargine Solostar (Lantus SoloStar) 100 UNIT/ML SOPN, Inject 0.3 mL (30 Units total) under the skin daily at bedtime, Disp: 45 mL, Rfl: 1    insulin lispro (HumaLOG) 100 units/mL injection pen, Inject up to 20 Units under the skin 3 (three) times a day with meals,  Disp: 15 mL, Rfl: 5    Insulin Pen Needle (Easy Comfort Pen Needles) 33G X 5 MM MISC, USE AS DIRECTED, Disp: 100 each, Rfl: 4    Lancets (onetouch ultrasoft) lancets, Use as instructed (Patient not taking: Reported on 2/17/2025), Disp: 100 each, Rfl: 3    Lancets (onetouch ultrasoft) lancets, Use daily (Patient not taking: Reported on 2/17/2025), Disp: 100 each, Rfl: 3    lisinopril (ZESTRIL) 40 mg tablet, Take 1 tablet (40 mg total) by mouth daily, Disp: 90 tablet, Rfl: 1    metFORMIN (GLUCOPHAGE-XR) 750 mg 24 hr tablet, Take 1 tablet (750 mg total) by mouth 2 (two) times a day with meals, Disp: 180 tablet, Rfl: 1    metoprolol succinate (TOPROL-XL) 100 mg 24 hr tablet, Take 1 tablet (100 mg total) by mouth 2 (two) times a day, Disp: 180 tablet, Rfl: 0    mupirocin (BACTROBAN) 2 % ointment, Apply topically 3 (three) times a day (Patient not taking: Reported on 5/13/2025), Disp: 15 g, Rfl: 0    promethazine (PHENERGAN) 12.5 mg/10 mL oral solution, Take 10 mL (12.5 mg total) by mouth every 6 (six) hours as needed (cough), Disp: 118 mL, Rfl: 0    tamsulosin (FLOMAX) 0.4 mg, Take 1 capsule (0.4 mg total) by mouth daily with dinner, Disp: 90 capsule, Rfl: 1  No current facility-administered medications for this visit.    Review of Systems   Constitutional:  Negative for chills and fever.   HENT:  Negative for congestion and sneezing.    Respiratory:  Negative for cough and shortness of breath.    Musculoskeletal:  Positive for gait problem.   Skin:  Positive for wound.   Psychiatric/Behavioral:  Negative for agitation.        Objective:  /70   Pulse 64   Temp 97.7 °F (36.5 °C) (Tympanic)         Physical Exam  Constitutional:       General: He is awake. He is not in acute distress.     Appearance: He is obese. He is not ill-appearing, toxic-appearing or diaphoretic.   HENT:      Head: Normocephalic and atraumatic.      Right Ear: External ear normal.      Left Ear: External ear normal.     Eyes:       Conjunctiva/sclera: Conjunctivae normal.       Cardiovascular:      Pulses:           Dorsalis pedis pulses are 1+ on the right side and 1+ on the left side.   Pulmonary:      Effort: Pulmonary effort is normal. No respiratory distress.     Musculoskeletal:      Right lower leg: Edema present.      Left lower leg: Edema present.      Comments: Right TMA present.     Skin:     General: Skin is warm and dry.      Findings: Wound present.      Comments: 1.  Bilateral lower extremity wounds.  No devitalized tissue present. No purulent drainage or malodor. No exposed or palpable bone present. No erythema, warmth or lymphangitic streaking to suggest cellulitis. Refer to wound assessment for additional details.     Neurological:      Mental Status: He is alert.      Gait: Gait abnormal.     Psychiatric:         Mood and Affect: Mood normal.         Behavior: Behavior normal. Behavior is cooperative.         Wound 12/16/24 Diabetic Ulcer Foot Right;Plantar (Active)   Wound Image   05/28/25 0958   Enter Parks score: Parks Grade 1: Partial or full-thickness ulcer (superficial) 05/19/25 1039   Wound Description Epithelialization;Pink 05/28/25 0958   Non-staged Wound Description Full thickness 05/28/25 0958   Wound Length (cm) 0.1 cm 05/28/25 0958   Wound Width (cm) 0.1 cm 05/28/25 0958   Wound Depth (cm) 0.1 cm 05/28/25 0958   Wound Surface Area (cm^2) 0.01 cm^2 05/28/25 0958   Wound Volume (cm^3) 0.001 cm^3 05/28/25 0958   Calculated Wound Volume (cm^3) 0 cm^3 05/28/25 0958   Change in Wound Size % 100 05/28/25 0958   Number of underminings 1 04/21/25 1015   Undermining 1 0 05/12/25 1102   Undermining 1 is depth extending from resolved 05/12/25 1102   Drainage Amount None 05/28/25 0958   Drainage Description Tan 05/12/25 1102   Lavern-wound Assessment Intact;Dry 05/28/25 0958   Wound packed? No 05/28/25 0958   Dressing Status Removed 05/28/25 0958       Wound 12/30/24 Diabetic Ulcer Toe D3, third Anterior;Left (Active)    Wound Image   05/28/25 0957   Enter Parks score: Parks Grade 1: Partial or full-thickness ulcer (superficial) 05/19/25 1039   Wound Description Pink 05/28/25 0957   Non-staged Wound Description Full thickness 05/28/25 0957   Wound Length (cm) 0.2 cm 05/28/25 0957   Wound Width (cm) 0.6 cm 05/28/25 0957   Wound Depth (cm) 0.1 cm 05/28/25 0957   Wound Surface Area (cm^2) 0.09 cm^2 05/28/25 0957   Wound Volume (cm^3) 0.006 cm^3 05/28/25 0957   Calculated Wound Volume (cm^3) 0.01 cm^3 05/28/25 0957   Change in Wound Size % 83.33 05/28/25 0957   Number of underminings 1 02/17/25 0955   Undermining 1 0 03/10/25 1053   Undermining 1 is depth extending from resolved 03/10/25 1053   Drainage Amount Small 05/28/25 0957   Drainage Description Serosanguineous 05/28/25 0957   Lavern-wound Assessment Dry;Scaly;Intact 05/28/25 0957   Wound packed? No 05/28/25 0957   Dressing Status Removed 05/28/25 0957           Results from last 6 Months   Lab Units 02/17/25  1118   WOUND CULTURE  2+ Growth of Staphylococcus aureus*       Lab Results   Component Value Date    HGBA1C 7.2 (A) 04/28/2025       Wound Instructions:  Orders Placed This Encounter   Procedures    Wound cleansing and dressings     Wound cleansing and dressings     Shower, No. Do note get dressing wet. Sponge bathe only.     Left 3rd toe Wound:  Apply Acticoat 3 to the wound.  Cover with dry gauze & full abd pad to assist with offloading.  Secure with rolled gauze & tape  Change 3 x weekly & as needed for soilage & dislodgment at the wound center.     Watch for signs of infection these include a fever of 100.4°F (38°C) or higher, chills  Signs of wound infection include increasing swelling, redness, warmth, pain around the wound. Foul smell coming from wound  Go to the closest Emergency Room with signs of infection to be evaluated.         Right foot wound:  Apply skin prep to heel  Apply Silver Alginate to the wound.   Cover with an ABD.   Secure with rolled gauze and  "tape.    Bulky \"football\" dsg applied.  Change dressing weekly at the wound center.            Go to the nearest ER if you develop severe pain in your right foot or leg.           Follow up at the wound center with Dr. Ponce on Monday     Standing Status:   Future     Expiration Date:   6/4/2025    Wound Procedure Treatment Diabetic Ulcer Anterior;Left Toe D3, third     This order was created via procedure documentation    Wound Procedure Treatment Diabetic Ulcer Right;Plantar Foot     This order was created via procedure documentation           KIRIT Valencia, JAMES-C, GUILLERMO    Portions of the record may have been created with voice recognition software. Occasional wrong word or \"sound alike\" substitutions may have occurred due to the inherent limitations of voice recognition software. Read the chart carefully and recognize, using context, where substitutions have occurred.          Total time spent today:    Total time (face-to-face and non-face-to-face) spent on today's visit was 10 minutes. This includes preparation for the visits (i.e. reviewing test results from date recent hospitalizations, ER/Urgent Care/primary care visits and recent consultant office visits), performance of a medically appropriate history and examination, and orders for medications or testing.     "

## 2025-05-28 NOTE — PATIENT INSTRUCTIONS
"Orders Placed This Encounter   Procedures    Wound cleansing and dressings     Wound cleansing and dressings     Shower, No. Do note get dressing wet. Sponge bathe only.     Left 3rd toe Wound:  Apply Acticoat 3 to the wound.  Cover with dry gauze & full abd pad to assist with offloading.  Secure with rolled gauze & tape  Change 3 x weekly & as needed for soilage & dislodgment at the wound center.     Watch for signs of infection these include a fever of 100.4°F (38°C) or higher, chills  Signs of wound infection include increasing swelling, redness, warmth, pain around the wound. Foul smell coming from wound  Go to the closest Emergency Room with signs of infection to be evaluated.         Right foot wound:  Apply skin prep to heel  Apply Silver Alginate to the wound.   Cover with an ABD.   Secure with rolled gauze and tape.    Bulky \"football\" dsg applied.  Change dressing weekly at the wound center.            Go to the nearest ER if you develop severe pain in your right foot or leg.           Follow up at the wound center with Dr. Ponce on Monday     Standing Status:   Future     Expiration Date:   6/4/2025      "

## 2025-06-02 ENCOUNTER — OFFICE VISIT (OUTPATIENT)
Dept: WOUND CARE | Facility: CLINIC | Age: 75
End: 2025-06-02
Payer: COMMERCIAL

## 2025-06-02 VITALS
SYSTOLIC BLOOD PRESSURE: 170 MMHG | HEART RATE: 79 BPM | TEMPERATURE: 98.4 F | DIASTOLIC BLOOD PRESSURE: 84 MMHG | RESPIRATION RATE: 16 BRPM

## 2025-06-02 DIAGNOSIS — E11.40 TYPE 2 DIABETES MELLITUS WITH DIABETIC NEUROPATHY, UNSPECIFIED WHETHER LONG TERM INSULIN USE (HCC): ICD-10-CM

## 2025-06-02 DIAGNOSIS — L97.521 ULCER OF TOE OF LEFT FOOT, LIMITED TO BREAKDOWN OF SKIN (HCC): ICD-10-CM

## 2025-06-02 DIAGNOSIS — L03.116 CELLULITIS OF LEFT LOWER LEG: ICD-10-CM

## 2025-06-02 DIAGNOSIS — L97.412 ULCER OF HEEL AND MIDFOOT, RIGHT, WITH FAT LAYER EXPOSED (HCC): Primary | ICD-10-CM

## 2025-06-02 PROCEDURE — 99213 OFFICE O/P EST LOW 20 MIN: CPT | Performed by: PODIATRIST

## 2025-06-02 PROCEDURE — 99212 OFFICE O/P EST SF 10 MIN: CPT | Performed by: PODIATRIST

## 2025-06-02 RX ORDER — METOPROLOL SUCCINATE 100 MG/1
100 TABLET, EXTENDED RELEASE ORAL 2 TIMES DAILY
Qty: 180 TABLET | Refills: 0 | Status: SHIPPED | OUTPATIENT
Start: 2025-06-02

## 2025-06-02 NOTE — PROGRESS NOTES
Wound Procedure Treatment Diabetic Ulcer Anterior;Left Toe D3, third    Performed by: Tanner Nielson RN  Authorized by: Satnam Ponce DPM  Associated wounds:   Wound 12/30/24 Diabetic Ulcer Toe D3, third Anterior;Left    Wound cleansed with:  NSS   Applied primary dressing:  Acticoat   Applied secondary dressing:  Gauze   Dressing secured with:  Tape   Offloading device appllied:  Surgical shoe   Comments:  Acticoat 3

## 2025-06-02 NOTE — PATIENT INSTRUCTIONS
"Orders Placed This Encounter   Procedures    Wound cleansing and dressings Diabetic Ulcer Anterior;Left Toe D3, third     Shower, No. Do note get dressing wet. Sponge bathe only.       Left 3rd toe Wound:  Apply Acticoat 3 to the wound.  Cover with dry gauze.  Secure with rolled gauze & tape  Change 3 x weekly & as needed for soilage & dislodgment at the wound center.     Watch for signs of infection these include a fever of 100.4°F (38°C) or higher, chills  Signs of wound infection include increasing swelling, redness, warmth, pain around the wound. Foul smell coming from wound  Go to the closest Emergency Room with signs of infection to be evaluated.               Follow up at the wound center in one week.     Standing Status:   Future     Expiration Date:   6/9/2025    Wound cleansing and dressings Diabetic Ulcer Right;Plantar Foot     Wound cleansing and dressings Diabetic Ulcer Right;Plantar Foot  Shower. No. Do not get dressing wet. Sponge bateh only.            Right foot wound:  Apply skin prep to heel and the 1/4\" offloading pad.   Cover with dry gauze and ABD.   Secure with rolled gauze and tape.    Bulky \"football\" dsg applied.(3 cast padding, 1 kerlix, 1 coban)  Change dressing weekly at the wound center.              Go to the nearest ER if you develop severe pain in your right foot or leg.           Follow up at the wound center in one week.     Standing Status:   Future     Expiration Date:   6/9/2025      "

## 2025-06-02 NOTE — PROGRESS NOTES
Patient ID: Won Gaitan Jr. is a 75 y.o. male Date of Birth 1950       Chief Complaint   Patient presents with    Follow Up Wound Care Visit     Left toe and right foot wounds       Allergies:  Simvastatin and Itraconazole    Assessments:      Diagnosis ICD-10-CM Associated Orders   1. Ulcer of heel and midfoot, right, with fat layer exposed ([facility])  L97.412 Wound cleansing and dressings Diabetic Ulcer Right;Plantar Foot     Wound Procedure Treatment Diabetic Ulcer Right;Plantar Foot      2. Ulcer of toe of left foot, limited to breakdown of skin ([facility])  L97.521 Wound cleansing and dressings Diabetic Ulcer Anterior;Left Toe D3, third     Wound Procedure Treatment Diabetic Ulcer Anterior;Left Toe D3, third      3. Type 2 diabetes mellitus with diabetic neuropathy, unspecified whether long term insulin use ([facility])  E11.40              Plan:  Reviewed medical records.  Patient was counseled and educated on the condition and the diagnosis.   2.  The diagnosis, treatment options and prognosis were discussed with the patient.    3.   Continue Football dressing on right foot for another week.  Left 3rd toe wound is smaller.  Continue local care.  X-ray reviewed.  Stressed on pt compliance about proper off-loading and staying off of his feet.    4.  RA in 1 week.         Imaging: I have personally reviewed pertinent films in PACS  Labs, pathology, and Other Studies: I have personally reviewed pertinent reports.        Procedures       Subjective:     HPI  The patient presents for evaluation and treatment of wounds.  No pain in his feet.  No new complaint.  BS under control.      The following portions of the patient's history were reviewed and updated as appropriate:   Patient Active Problem List   Diagnosis    Hyperlipidemia    Type 2 diabetes mellitus with diabetic neuropathy (HCC)    Atrial fibrillation (HCC)    Right middle lobe pulmonary nodule    Type 2 diabetes mellitus with other skin ulcer (CODE) ([facility])     "Essential hypertension    Benign prostatic hyperplasia with lower urinary tract symptoms    Microalbuminuria    Ulcer of right foot with fat layer exposed (HCC)    Ambulatory dysfunction    S/P transmetatarsal amputation of foot, right (HCC)    Sleep disturbance    Anxiety and depression    S/P R ALT flap to right foot    Gait abnormality    Vasomotor rhinitis    Corns and callosities    Tinea unguium    Cellulitis of left lower leg    Puncture wound of left foot with foreign body, subsequent encounter    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 (HCC)    Personal history of COVID-19    Bronchitis    S/P amputation of lesser toe, left (HCC)    Ulcer of toe of left foot, limited to breakdown of skin (HCC)    Medication management    Subconjunctival hemorrhage of right eye    Ulcer of heel and midfoot, right, with fat layer exposed ([facility])    Uses self-applied continuous glucose monitoring device     Past Medical History:   Diagnosis Date    Arthritis     Atrial fibrillation (HCC)     Diagnosed 11/2018    Benign prostate hyperplasia 04/2002    Cellulitis     right lower leg     Colon polyp 2006    Diabetes mellitus ([facility])     Hearing aid worn     bilat    Hearing loss     90% loss left ear and 40% right ear    History of clubfoot     \"since birth\"    History of pneumonia     History of TIA (transient ischemic attack) 04/25/2017 11/30/18 pt denies    Hyperlipidemia 5/15/2014    Hypertension     Infectious viral hepatitis     \"cant remember type\"    Irregular heart beat     Afib    Neuropathy     both feet    Osteomyelitis (HCC)     right great toe    Pneumonia     Right middle lobe pulmonary nodule 11/6/2018    Seasickness     Teeth missing     Type 2 diabetes mellitus with diabetic neuropathy (HCC) 11/6/2018    Wears glasses     reading    Wound, open     bottom of right foot     Past Surgical History:   Procedure Laterality Date    BUNIONECTOMY Right " 12/04/2018    Procedure: 5TH METATARSAL BONE PARTIAL RESECTION, FULL THICKNESS DEBRIDEMENT OF DIABETIC ULCER;  Surgeon: Shala Flynn DPM;  [facility];  Service: Podiatry    CATARACT EXTRACTION Bilateral     CLOSURE DELAYED PRIMARY Left 10/30/2023    Procedure: CLOSURE DELAYED PRIMARY left foot;  Surgeon: Minh Urena DPM;  [facility];  Service: Podiatry    CLUB FOOT RELEASE Bilateral     COLONOSCOPY      COMPLEX WOUND CLOSURE TO EXTREMITY  04/27/2021    Procedure: COMPLEX WOUND CLOSURE TO EXTREMITY: RIGHT FOOT;  Surgeon: Niraj Bojorquez MD;  [facility];  Service: Plastics    EXTERNAL FIXATOR APPLICATION Right 02/12/2021    Procedure: Application multiplane external fixation;  Surgeon: Harry Zamudio DPM;  [facility];  Service: Podiatry    FOOT HARDWARE REMOVAL Right 02/17/2021    Procedure: REMOVAL EXTERNAL FIXATOR;  Surgeon: Harry Zamudio DPM;  [facility];  Service: Podiatry    FOREIGN BODY REMOVAL Right 04/27/2021    Procedure: REMOVAL FOREIGN BODY EXTREMITY: RIGHT FOOT;  Surgeon: Niraj Bojorquez MD;  [facility];  Service: Plastics    INCISION AND DRAINAGE OF WOUND Right 02/17/2021    Procedure: INCISION AND DRAINAGE (I&D) EXTREMITY;  Surgeon: Harry Zamudio DPM;  [facility];  Service: Podiatry    INCISION AND DRAINAGE OF WOUND Left 10/26/2023    Procedure: INCISION AND DRAINAGE (I&D) EXTREMITY; WASHOUT; REMOVAL OF FOREIGN BODY;  Surgeon: Satnam Ponce DPM;  [facility];  Service: Podiatry    ORIF FOOT FRACTURE Right 03/04/2021    Procedure: VAC PLACEMENT; SCREW FIXATION RIGHT FOOT FUSION;  Surgeon: Harry Zamudio DPM;  [facility];  Service: Podiatry    MT AMPUTATION FOOT TRANSMETARSAL Right 02/22/2021    Procedure: AMPUTATION TRANSMETATARSAL (TMA), REMOVAL NAIL IM T2 ICF HARDWARE;  Surgeon: Harry Zamudio DPM;  [facility];  Service: Podiatry    MT AMPUTATION METATARSAL W/TOE SINGLE Left  11/29/2023    Procedure: LEFT FOURTH RAY RESECTION FOOT;  Surgeon: Harry Zamudio DPM;  [facility];  Service: Podiatry    HI ARTHRD MIDTARSL/TARS MLT/TRANSVRS W/OSTEOT Right 02/12/2021    Procedure: foot ARTHRODESIS/FUSION;  Surgeon: Harry Zamudio DPM;  [facility];  Service: Podiatry    HI DEBRIDEMENT BONE 1ST 20 SQ CM/< Right 12/02/2021    Procedure: DEBRIDEMENT OF TARSAL BONES WITH ANITBIODIC BEADS;  Surgeon: Harry Zamudio DPM;  [facility];  Service: Podiatry    HI LNGTH/SHRT TENDON LEG/ANKLE 1 TENDON SPX Right 02/12/2021    Procedure: LENGTHEN TIBIAL TENDON, TRANS HEEL CORD;  Surgeon: Harry Zamudio DPM;  [facility];  Service: Podiatry    HI MUSC MYOCUTANEOUS/FASCIOCUTANEOUS FLAP TRUNK Right 04/12/2021    Procedure: RECONSTRUCTION MICROSURGICAL W/ FREE FLAP;  Surgeon: Niraj Bojorquez MD;  [facility];  Service: Plastics    HI MUSC MYOCUTANEOUS/FASCIOCUTANEOUS FLAP TRUNK Right 04/12/2021    Procedure: TAKEBACK RECONSTRUCTION MICROSURGICAL W/ FREE FLAP;  Surgeon: Niraj Bojorquez MD;  [facility];  Service: Plastics    HI MUSC MYOCUTANEOUS/FASCIOCUTANEOUS FLAP TRUNK Right 04/13/2021    Procedure: RECONSTRUCTION MICROSURGICAL W/ FREE FLAP, RE EXPLORATION, MICRO VASCULAR REVISION;  Surgeon: Niraj Bojorquez MD;  [facility];  Service: Plastics    HI OSTC PRTL EXOSTC/CONDYLC METAR HEAD Right 12/29/2020    Procedure: EXCISION EXOSTOSIS;  Surgeon: Harry Zamudio DPM;  [facility];  Service: Podiatry    HI SECONDARY CLOSURE SURG WOUND/DEHSN XTNSV/COMP Right 12/29/2020    Procedure: PRIMARY DELAYED CLOSURE;  Surgeon: Harry Zamudio DPM;  [facility];  Service: Podiatry    TOE AMPUTATION Right     partial great toe    TONSILLECTOMY      VAC DRESSING APPLICATION Right 03/01/2021    Procedure: APPLICATION VAC DRESSING EXTREMITY;  Surgeon: Niraj Bojorquez MD;  [facility];  Service:  Plastics    VASECTOMY  1992    WOUND DEBRIDEMENT Right 2021    Procedure: DEBRIDEMENT LOWER EXTREMITY (WASH OUT);  Surgeon: Niraj Bojorquez MD;  [facility];  Service: Plastics    WOUND DEBRIDEMENT Right 2021    Procedure: DEBRIDEMENT RIGHT FOOT;  Surgeon: Niraj Bojorquez MD;  [facility];  Service: Plastics    WOUND DEBRIDEMENT Right 2021    Procedure: DEBRIDEMENT LOWER EXTREMITY (WASH OUT);  Surgeon: Niraj Bojorquez MD;  [facility];  Service: Plastics    WOUND DEBRIDEMENT Right 2021    Procedure: DEBRIDEMENT LOWER EXTREMITY (WASH OUT): RIGHT FOOT;  Surgeon: Niraj Bojorquez MD;  [facility];  Service: Plastics     Family History   Problem Relation Name Age of Onset    Diabetes Father          mellitus      Social History     Socioeconomic History    Marital status: /Civil Union   Tobacco Use    Smoking status: Former     Current packs/day: 0.00     Types: Cigarettes     Quit date: 1988     Years since quittin.5     Passive exposure: Past    Smokeless tobacco: Former    Tobacco comments:     exposure to passive smoke   Vaping Use    Vaping status: Never Used   Substance and Sexual Activity    Alcohol use: Yes     Comment: occasionally    Drug use: Not Currently    Sexual activity: Yes     Partners: Female     Social Drivers of Health     Food Insecurity: No Food Insecurity (2025)    Nursing - Inadequate Food Risk Classification     Worried About Running Out of Food in the Last Year: Never true     Ran Out of Food in the Last Year: Never true     Ran Out of Food in the Last Year: Never true   Transportation Needs: No Transportation Needs (2025)    Nursing - Transportation Risk Classification     Lack of Transportation: No   Intimate Partner Violence: Unknown (2025)    Nursing IPS     Physically Hurt by Someone: No     Hurt or Threatened by Someone: No   Housing Stability: Unknown  (2025)    Nursing: Inadequate Housing Risk Classification     Unable to Pay for Housing in the Last Year: No     Has Housin        Current Outpatient Medications:     acetaminophen (Mapap Arthritis Pain) 650 mg CR tablet, Take 1 tablet (650 mg total) by mouth 2 (two) times a day (Patient not taking: Reported on 2025), Disp: 60 tablet, Rfl: 1    atorvastatin (LIPITOR) 40 mg tablet, Take 1 tablet (40 mg total) by mouth daily, Disp: 90 tablet, Rfl: 1    clopidogrel (Plavix) 75 mg tablet, Take 1 tablet (75 mg total) by mouth daily, Disp: 90 tablet, Rfl: 1    Continuous Glucose Sensor (FreeStyle Taylor 3 Sensor) MISC, Use 1 each every 14 (fourteen) days, Disp: 1 each, Rfl: 0    diltiazem (CARDIZEM) 120 MG tablet, Take 1 tablet (120 mg total) by mouth every 12 (twelve) hours, Disp: 180 tablet, Rfl: 1    escitalopram (LEXAPRO) 20 mg tablet, Take 1 tablet (20 mg total) by mouth daily, Disp: 90 tablet, Rfl: 0    glucose blood (Accu-Chek Guide) test strip, USE DAILY (Patient not taking: Reported on 2025), Disp: 100 strip, Rfl: 2    Insulin Glargine Solostar (Lantus SoloStar) 100 UNIT/ML SOPN, Inject 0.3 mL (30 Units total) under the skin daily at bedtime, Disp: 45 mL, Rfl: 1    insulin lispro (HumaLOG) 100 units/mL injection pen, Inject up to 20 Units under the skin 3 (three) times a day with meals, Disp: 15 mL, Rfl: 5    Insulin Pen Needle (Easy Comfort Pen Needles) 33G X 5 MM MISC, USE AS DIRECTED, Disp: 100 each, Rfl: 4    Lancets (onetouch ultrasoft) lancets, Use as instructed (Patient not taking: Reported on 2025), Disp: 100 each, Rfl: 3    Lancets (onetouch ultrasoft) lancets, Use daily (Patient not taking: Reported on 2025), Disp: 100 each, Rfl: 3    lisinopril (ZESTRIL) 40 mg tablet, Take 1 tablet (40 mg total) by mouth daily, Disp: 90 tablet, Rfl: 1    metFORMIN (GLUCOPHAGE-XR) 750 mg 24 hr tablet, Take 1 tablet (750 mg total) by mouth 2 (two) times a day with meals, Disp: 180 tablet, Rfl:  1    metoprolol succinate (TOPROL-XL) 100 mg 24 hr tablet, Take 1 tablet (100 mg total) by mouth 2 (two) times a day, Disp: 180 tablet, Rfl: 0    mupirocin (BACTROBAN) 2 % ointment, Apply topically 3 (three) times a day (Patient not taking: Reported on 5/13/2025), Disp: 15 g, Rfl: 0    promethazine (PHENERGAN) 12.5 mg/10 mL oral solution, Take 10 mL (12.5 mg total) by mouth every 6 (six) hours as needed (cough), Disp: 118 mL, Rfl: 0    tamsulosin (FLOMAX) 0.4 mg, Take 1 capsule (0.4 mg total) by mouth daily with dinner, Disp: 90 capsule, Rfl: 1    Review of Systems   Constitutional:  Negative for chills and fever.   Respiratory:  Negative for shortness of breath.    Cardiovascular:  Negative for chest pain.   Gastrointestinal:  Negative for nausea and vomiting.   Skin:  Positive for wound.   Neurological:  Positive for numbness. Negative for weakness.       Objective:  /84   Pulse 79   Temp 98.4 °F (36.9 °C)   Resp 16   Pain Score: 0-No pain     Physical Exam  Vitals and nursing note reviewed.   Constitutional:       General: He is not in acute distress.     Appearance: He is not toxic-appearing or diaphoretic.     Cardiovascular:      Rate and Rhythm: Normal rate and regular rhythm.      Pulses:           Dorsalis pedis pulses are 1+ on the right side and 1+ on the left side.        Posterior tibial pulses are 0 on the right side and 0 on the left side.   Pulmonary:      Effort: Pulmonary effort is normal. No respiratory distress.     Musculoskeletal:         General: Deformity present. No signs of injury.      Right lower leg: Edema present.      Left lower leg: Edema present.      Right foot: No foot drop.      Left foot: No foot drop.      Comments: S/P partial foot amputation right.     Skin:     General: Skin is warm.      Capillary Refill: Capillary refill takes less than 2 seconds.      Coloration: Skin is not cyanotic or mottled.      Findings: Wound present. No abscess.      Nails: There is no  clubbing.      Comments: Wound on left 3rd toe is granular.  Wound is smaller.  No exposed bone or probing.  Mild keratosis in right plantar foot.  Wound remains healed, but skin still fragil.  See wound assessment and photo.     Neurological:      General: No focal deficit present.      Mental Status: He is alert and oriented to person, place, and time.      Cranial Nerves: No cranial nerve deficit.      Sensory: Sensory deficit present.      Motor: No weakness.      Coordination: Coordination normal.     Psychiatric:         Mood and Affect: Mood normal.         Behavior: Behavior normal.         Thought Content: Thought content normal.         Judgment: Judgment normal.           Wound 12/16/24 Diabetic Ulcer Foot Right;Plantar (Active)   Wound Image   06/02/25 1010   Enter Parks score: Parks Grade 1: Partial or full-thickness ulcer (superficial) 06/02/25 1011   Wound Description Epithelialization 06/02/25 1011   Non-staged Wound Description Full thickness 06/02/25 1011   Wound Length (cm) 0.1 cm 06/02/25 1011   Wound Width (cm) 0.1 cm 06/02/25 1011   Wound Depth (cm) 0.1 cm 06/02/25 1011   Wound Surface Area (cm^2) 0.01 cm^2 06/02/25 1011   Wound Volume (cm^3) 0.001 cm^3 06/02/25 1011   Calculated Wound Volume (cm^3) 0 cm^3 06/02/25 1011   Change in Wound Size % 100 06/02/25 1011   Number of underminings 1 04/21/25 1015   Undermining 1 0 05/12/25 1102   Undermining 1 is depth extending from resolved 05/12/25 1102   Drainage Amount None 06/02/25 1011   Drainage Description Tan 05/12/25 1102   Lavern-wound Assessment Intact;Callus 06/02/25 1011   Wound packed? No 06/02/25 1011   Dressing Status Removed 05/28/25 0958       Wound 12/30/24 Diabetic Ulcer Toe D3, third Anterior;Left (Active)   Wound Image   06/02/25 1009   Enter Parks score: Parks Grade 1: Partial or full-thickness ulcer (superficial) 06/02/25 1010   Wound Description Pink;Epithelialization 06/02/25 1010   Non-staged Wound Description Full thickness  05/28/25 0957   Wound Length (cm) 0.2 cm 06/02/25 1010   Wound Width (cm) 0.3 cm 06/02/25 1010   Wound Depth (cm) 0.1 cm 06/02/25 1010   Wound Surface Area (cm^2) 0.05 cm^2 06/02/25 1010   Wound Volume (cm^3) 0.003 cm^3 06/02/25 1010   Calculated Wound Volume (cm^3) 0.01 cm^3 06/02/25 1010   Change in Wound Size % 83.33 06/02/25 1010   Number of underminings 1 02/17/25 0955   Undermining 1 0 03/10/25 1053   Undermining 1 is depth extending from resolved 03/10/25 1053   Drainage Amount None 06/02/25 1010   Drainage Description Serosanguineous 05/28/25 0957   Lavern-wound Assessment Dry;Scaly;Intact 06/02/25 1010   Wound packed? No 06/02/25 1010   Dressing Status Removed 05/28/25 0957                                 Results from last 6 Months   Lab Units 02/17/25  1118   WOUND CULTURE  2+ Growth of Staphylococcus aureus*         Lab Results   Component Value Date    HGBA1C 7.2 (A) 04/28/2025       Wound Instructions:  Orders Placed This Encounter   Procedures    Wound cleansing and dressings Diabetic Ulcer Anterior;Left Toe D3, third     Shower, No. Do note get dressing wet. Sponge bathe only.       Left 3rd toe Wound:  Apply Acticoat 3 to the wound.  Cover with dry gauze.  Secure with rolled gauze & tape  Change 3 x weekly & as needed for soilage & dislodgment at the wound center.     Watch for signs of infection these include a fever of 100.4°F (38°C) or higher, chills  Signs of wound infection include increasing swelling, redness, warmth, pain around the wound. Foul smell coming from wound  Go to the closest Emergency Room with signs of infection to be evaluated.               Follow up at the wound center in one week.     Standing Status:   Future     Expiration Date:   6/9/2025    Wound cleansing and dressings Diabetic Ulcer Right;Plantar Foot     Wound cleansing and dressings Diabetic Ulcer Right;Plantar Foot  Shower. No. Do not get dressing wet. Sponge bateh only.            Right foot wound:  Apply skin prep to  "heel and the 1/4\" offloading pad.   Cover with dry gauze and ABD.   Secure with rolled gauze and tape.    Bulky \"football\" dsg applied.(3 cast padding, 1 kerlix, 1 coban)  Change dressing weekly at the wound center.              Go to the nearest ER if you develop severe pain in your right foot or leg.           Follow up at the wound center in one week.     Standing Status:   Future     Expiration Date:   6/9/2025    Wound Procedure Treatment Diabetic Ulcer Anterior;Left Toe D3, third     This order was created via procedure documentation    Wound Procedure Treatment Diabetic Ulcer Right;Plantar Foot     This order was created via procedure documentation             Satnam Ponce DPM    "

## 2025-06-02 NOTE — PROGRESS NOTES
Wound Procedure Treatment Diabetic Ulcer Right;Plantar Foot    Performed by: Tanner Nielson RN  Authorized by: Satnam Ponce DPM  Associated wounds:   Wound 12/16/24 Diabetic Ulcer Foot Right;Plantar    Wound cleansed with:  Soap and water   Applied secondary dressing:  Gauze, ABD and Cast padding   Dressing secured with:  Kerlix, Tape and Coban   Comments:  Bulky dressing

## 2025-06-09 ENCOUNTER — OFFICE VISIT (OUTPATIENT)
Dept: WOUND CARE | Facility: CLINIC | Age: 75
End: 2025-06-09
Payer: COMMERCIAL

## 2025-06-09 VITALS
DIASTOLIC BLOOD PRESSURE: 86 MMHG | RESPIRATION RATE: 16 BRPM | TEMPERATURE: 97.8 F | SYSTOLIC BLOOD PRESSURE: 168 MMHG | HEART RATE: 81 BPM

## 2025-06-09 DIAGNOSIS — L97.521 ULCER OF TOE OF LEFT FOOT, LIMITED TO BREAKDOWN OF SKIN (HCC): ICD-10-CM

## 2025-06-09 DIAGNOSIS — L97.412 ULCER OF HEEL AND MIDFOOT, RIGHT, WITH FAT LAYER EXPOSED (HCC): Primary | ICD-10-CM

## 2025-06-09 DIAGNOSIS — E11.40 TYPE 2 DIABETES MELLITUS WITH DIABETIC NEUROPATHY, UNSPECIFIED WHETHER LONG TERM INSULIN USE (HCC): ICD-10-CM

## 2025-06-09 PROCEDURE — 99213 OFFICE O/P EST LOW 20 MIN: CPT | Performed by: PODIATRIST

## 2025-06-09 NOTE — PROGRESS NOTES
Wound Procedure Treatment Diabetic Ulcer Right;Plantar Foot    Performed by: Tanner Nielson RN  Authorized by: Satnam Ponce DPM  Associated wounds:   Wound 12/16/24 Diabetic Ulcer Foot Right;Plantar    Wound cleansed with:  Soap and water   Applied secondary dressing:  Foam   Comments:  Mepilex

## 2025-06-09 NOTE — PROGRESS NOTES
Patient ID: Won Gaitan Jr. is a 75 y.o. male Date of Birth 1950       Chief Complaint   Patient presents with    Follow Up Wound Care Visit     BLE wounds       Allergies:  Simvastatin and Itraconazole    Assessments:      Diagnosis ICD-10-CM Associated Orders   1. Ulcer of heel and midfoot, right, with fat layer exposed ([facility])  L97.412 Wound cleansing and dressings Diabetic Ulcer Right;Plantar Foot     Wound Procedure Treatment Diabetic Ulcer Right;Plantar Foot      2. Ulcer of toe of left foot, limited to breakdown of skin ([facility])  L97.521 Wound cleansing and dressings Diabetic Ulcer Anterior;Left Toe D3, third     Wound Procedure Treatment Diabetic Ulcer Anterior;Left Toe D3, third      3. Type 2 diabetes mellitus with diabetic neuropathy, unspecified whether long term insulin use ([facility])  E11.40 Wound cleansing and dressings Diabetic Ulcer Right;Plantar Foot             Plan:  Reviewed medical records.  Patient was counseled and educated on the condition and the diagnosis.   2.  The diagnosis, treatment options and prognosis were discussed with the patient.    3.   Right foot wound remains healed.  Use protective foam with felt.  Okay to use custom shoe.  Dry dressing on left 3rd toe.  Stressed on pt compliance about proper off-loading and staying off of his feet.  Discussed level of activity.  4.  RA in 2 weeks.         Imaging: I have personally reviewed pertinent films in PACS  Labs, pathology, and Other Studies: I have personally reviewed pertinent reports.        Procedures       Subjective:     HPI  The patient presents for evaluation and treatment of wounds.  No pain in his feet.  BS under control.        The following portions of the patient's history were reviewed and updated as appropriate:   Patient Active Problem List   Diagnosis    Hyperlipidemia    Type 2 diabetes mellitus with diabetic neuropathy (HCC)    Atrial fibrillation (HCC)    Right middle lobe pulmonary nodule    Type 2 diabetes  "mellitus with other skin ulcer (CODE) ([facility])    Essential hypertension    Benign prostatic hyperplasia with lower urinary tract symptoms    Microalbuminuria    Ulcer of right foot with fat layer exposed (HCC)    Ambulatory dysfunction    S/P transmetatarsal amputation of foot, right (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)    Personal history of COVID-19    Bronchitis    S/P amputation of lesser toe, left ([facility])    Ulcer of toe of left foot, limited to breakdown of skin ([facility])    Medication management    Subconjunctival hemorrhage of right eye    Ulcer of heel and midfoot, right, with fat layer exposed ([facility])    Uses self-applied continuous glucose monitoring device     Past Medical History:   Diagnosis Date    Arthritis     Atrial fibrillation ([facility])     Diagnosed 11/2018    Benign prostate hyperplasia 04/2002    Cellulitis     right lower leg     Colon polyp 2006    Diabetes mellitus ([facility])     Hearing aid worn     bilat    Hearing loss     90% loss left ear and 40% right ear    History of clubfoot     \"since birth\"    History of pneumonia     History of TIA (transient ischemic attack) 04/25/2017 11/30/18 pt denies    Hyperlipidemia 5/15/2014    Hypertension     Infectious viral hepatitis     \"cant remember type\"    Irregular heart beat     Afib    Neuropathy     both feet    Osteomyelitis (HCC)     right great toe    Pneumonia     Right middle lobe pulmonary nodule 11/6/2018    Seasickness     Teeth missing     Type 2 diabetes mellitus with diabetic neuropathy (HCC) 11/6/2018    Wears glasses     reading    Wound, open     bottom of right foot     Past Surgical History: "   Procedure Laterality Date    BUNIONECTOMY Right 12/04/2018    Procedure: 5TH METATARSAL BONE PARTIAL RESECTION, FULL THICKNESS DEBRIDEMENT OF DIABETIC ULCER;  Surgeon: Shala Flynn DPM;  [facility];  Service: Podiatry    CATARACT EXTRACTION Bilateral     CLOSURE DELAYED PRIMARY Left 10/30/2023    Procedure: CLOSURE DELAYED PRIMARY left foot;  Surgeon: Minh Urena DPM;  [facility];  Service: Podiatry    CLUB FOOT RELEASE Bilateral     COLONOSCOPY      COMPLEX WOUND CLOSURE TO EXTREMITY  04/27/2021    Procedure: COMPLEX WOUND CLOSURE TO EXTREMITY: RIGHT FOOT;  Surgeon: Niraj Bojorquez MD;  [facility];  Service: Plastics    EXTERNAL FIXATOR APPLICATION Right 02/12/2021    Procedure: Application multiplane external fixation;  Surgeon: Harry Zamudio DPM;  [facility];  Service: Podiatry    FOOT HARDWARE REMOVAL Right 02/17/2021    Procedure: REMOVAL EXTERNAL FIXATOR;  Surgeon: Harry Zamudio DPM;  [facility];  Service: Podiatry    FOREIGN BODY REMOVAL Right 04/27/2021    Procedure: REMOVAL FOREIGN BODY EXTREMITY: RIGHT FOOT;  Surgeon: Niraj Bojorquez MD;  [facility];  Service: Plastics    INCISION AND DRAINAGE OF WOUND Right 02/17/2021    Procedure: INCISION AND DRAINAGE (I&D) EXTREMITY;  Surgeon: Harry Zamudio DPM;  [facility];  Service: Podiatry    INCISION AND DRAINAGE OF WOUND Left 10/26/2023    Procedure: INCISION AND DRAINAGE (I&D) EXTREMITY; WASHOUT; REMOVAL OF FOREIGN BODY;  Surgeon: Satnam Ponce DPM;  [facility];  Service: Podiatry    ORIF FOOT FRACTURE Right 03/04/2021    Procedure: VAC PLACEMENT; SCREW FIXATION RIGHT FOOT FUSION;  Surgeon: Harry Zamudio DPM;  [facility];  Service: Podiatry    ID AMPUTATION FOOT TRANSMETARSAL Right 02/22/2021    Procedure: AMPUTATION TRANSMETATARSAL (TMA), REMOVAL NAIL IM T2 ICF HARDWARE;  Surgeon: Harry Zamudio DPM;  [facility];  Service:  Podiatry    MA AMPUTATION METATARSAL W/TOE SINGLE Left 11/29/2023    Procedure: LEFT FOURTH RAY RESECTION FOOT;  Surgeon: Harry Zamudio DPM;  [facility];  Service: Podiatry    MA ARTHRD MIDTARSL/TARS MLT/TRANSVRS W/OSTEOT Right 02/12/2021    Procedure: foot ARTHRODESIS/FUSION;  Surgeon: Harry Zamudio DPM;  [facility];  Service: Podiatry    MA DEBRIDEMENT BONE 1ST 20 SQ CM/< Right 12/02/2021    Procedure: DEBRIDEMENT OF TARSAL BONES WITH ANITBIODIC BEADS;  Surgeon: Harry Zamudio DPM;  [facility];  Service: Podiatry    MA LNGTH/SHRT TENDON LEG/ANKLE 1 TENDON SPX Right 02/12/2021    Procedure: LENGTHEN TIBIAL TENDON, TRANS HEEL CORD;  Surgeon: Harry Zamudio DPM;  [facility];  Service: Podiatry    MA MUSC MYOCUTANEOUS/FASCIOCUTANEOUS FLAP TRUNK Right 04/12/2021    Procedure: RECONSTRUCTION MICROSURGICAL W/ FREE FLAP;  Surgeon: Niraj Bojorquez MD;  [facility];  Service: Plastics    MA MUSC MYOCUTANEOUS/FASCIOCUTANEOUS FLAP TRUNK Right 04/12/2021    Procedure: TAKEBACK RECONSTRUCTION MICROSURGICAL W/ FREE FLAP;  Surgeon: Niraj Bojorquez MD;  [facility];  Service: Plastics    MA MUSC MYOCUTANEOUS/FASCIOCUTANEOUS FLAP TRUNK Right 04/13/2021    Procedure: RECONSTRUCTION MICROSURGICAL W/ FREE FLAP, RE EXPLORATION, MICRO VASCULAR REVISION;  Surgeon: Niraj Bojorquez MD;  [facility];  Service: Plastics    MA OSTC PRTL EXOSTC/CONDYLC METAR HEAD Right 12/29/2020    Procedure: EXCISION EXOSTOSIS;  Surgeon: Harry Zamudio DPM;  [facility];  Service: Podiatry    MA SECONDARY CLOSURE SURG WOUND/DEHSN XTNSV/COMP Right 12/29/2020    Procedure: PRIMARY DELAYED CLOSURE;  Surgeon: Harry Zamudio DPM;  [facility];  Service: Podiatry    TOE AMPUTATION Right     partial great toe    TONSILLECTOMY      VAC DRESSING APPLICATION Right 03/01/2021    Procedure: APPLICATION VAC DRESSING EXTREMITY;  Surgeon: Niraj BAINS  Jace Bojorquez MD;  [facility];  Service: Plastics    VASECTOMY  1992    WOUND DEBRIDEMENT Right 2021    Procedure: DEBRIDEMENT LOWER EXTREMITY (WASH OUT);  Surgeon: Niraj Bojorquez MD;  [facility];  Service: Plastics    WOUND DEBRIDEMENT Right 2021    Procedure: DEBRIDEMENT RIGHT FOOT;  Surgeon: Niraj Bojorquez MD;  [facility];  Service: Plastics    WOUND DEBRIDEMENT Right 2021    Procedure: DEBRIDEMENT LOWER EXTREMITY (WASH OUT);  Surgeon: Niraj Bojorquez MD;  [facility];  Service: Plastics    WOUND DEBRIDEMENT Right 2021    Procedure: DEBRIDEMENT LOWER EXTREMITY (WASH OUT): RIGHT FOOT;  Surgeon: Niraj Bojorquez MD;  [facility];  Service: Plastics     Family History   Problem Relation Name Age of Onset    Diabetes Father          mellitus      Social History     Socioeconomic History    Marital status: /Civil Union   Tobacco Use    Smoking status: Former     Current packs/day: 0.00     Types: Cigarettes     Quit date: 1988     Years since quittin.6     Passive exposure: Past    Smokeless tobacco: Former    Tobacco comments:     exposure to passive smoke   Vaping Use    Vaping status: Never Used   Substance and Sexual Activity    Alcohol use: Yes     Comment: occasionally    Drug use: Not Currently    Sexual activity: Yes     Partners: Female     Social Drivers of Health     Food Insecurity: No Food Insecurity (2025)    Nursing - Inadequate Food Risk Classification     Worried About Running Out of Food in the Last Year: Never true     Ran Out of Food in the Last Year: Never true     Ran Out of Food in the Last Year: Never true   Transportation Needs: No Transportation Needs (2025)    Nursing - Transportation Risk Classification     Lack of Transportation: No   Intimate Partner Violence: Unknown (2025)    Nursing IPS     Physically Hurt by Someone: No     Hurt or Threatened  by Someone: No   Housing Stability: Unknown (2025)    Nursing: Inadequate Housing Risk Classification     Unable to Pay for Housing in the Last Year: No     Has Housin        Current Outpatient Medications:     acetaminophen (Mapap Arthritis Pain) 650 mg CR tablet, Take 1 tablet (650 mg total) by mouth 2 (two) times a day (Patient not taking: Reported on 2025), Disp: 60 tablet, Rfl: 1    atorvastatin (LIPITOR) 40 mg tablet, Take 1 tablet (40 mg total) by mouth daily, Disp: 90 tablet, Rfl: 1    clopidogrel (Plavix) 75 mg tablet, Take 1 tablet (75 mg total) by mouth daily, Disp: 90 tablet, Rfl: 1    Continuous Glucose Sensor (FreeStyle Taylor 3 Sensor) MISC, Use 1 each every 14 (fourteen) days, Disp: 1 each, Rfl: 0    diltiazem (CARDIZEM) 120 MG tablet, Take 1 tablet (120 mg total) by mouth every 12 (twelve) hours, Disp: 180 tablet, Rfl: 1    escitalopram (LEXAPRO) 20 mg tablet, Take 1 tablet (20 mg total) by mouth daily, Disp: 90 tablet, Rfl: 0    glucose blood (Accu-Chek Guide) test strip, USE DAILY (Patient not taking: Reported on 2025), Disp: 100 strip, Rfl: 2    Insulin Glargine Solostar (Lantus SoloStar) 100 UNIT/ML SOPN, Inject 0.3 mL (30 Units total) under the skin daily at bedtime, Disp: 45 mL, Rfl: 1    insulin lispro (HumaLOG) 100 units/mL injection pen, Inject up to 20 Units under the skin 3 (three) times a day with meals, Disp: 15 mL, Rfl: 5    Insulin Pen Needle (Easy Comfort Pen Needles) 33G X 5 MM MISC, USE AS DIRECTED, Disp: 100 each, Rfl: 4    Lancets (onetouch ultrasoft) lancets, Use as instructed (Patient not taking: Reported on 2025), Disp: 100 each, Rfl: 3    Lancets (onetouch ultrasoft) lancets, Use daily (Patient not taking: Reported on 2025), Disp: 100 each, Rfl: 3    lisinopril (ZESTRIL) 40 mg tablet, Take 1 tablet (40 mg total) by mouth daily, Disp: 90 tablet, Rfl: 1    metFORMIN (GLUCOPHAGE-XR) 750 mg 24 hr tablet, Take 1 tablet (750 mg total) by mouth 2 (two)  times a day with meals, Disp: 180 tablet, Rfl: 1    metoprolol succinate (TOPROL-XL) 100 mg 24 hr tablet, TAKE 1 TABLET (100 MG TOTAL) BY MOUTH 2 (TWO) TIMES A DAY, Disp: 180 tablet, Rfl: 0    mupirocin (BACTROBAN) 2 % ointment, Apply topically 3 (three) times a day (Patient not taking: Reported on 5/13/2025), Disp: 15 g, Rfl: 0    promethazine (PHENERGAN) 12.5 mg/10 mL oral solution, Take 10 mL (12.5 mg total) by mouth every 6 (six) hours as needed (cough), Disp: 118 mL, Rfl: 0    tamsulosin (FLOMAX) 0.4 mg, Take 1 capsule (0.4 mg total) by mouth daily with dinner, Disp: 90 capsule, Rfl: 1    Review of Systems   Constitutional:  Negative for chills and fever.   Respiratory:  Negative for shortness of breath.    Cardiovascular:  Negative for chest pain.   Gastrointestinal:  Negative for nausea and vomiting.   Neurological:  Positive for numbness. Negative for weakness.       Objective:  /86   Pulse 81   Temp 97.8 °F (36.6 °C)   Resp 16         Physical Exam  Vitals and nursing note reviewed.   Constitutional:       General: He is not in acute distress.     Appearance: He is not toxic-appearing or diaphoretic.     Cardiovascular:      Rate and Rhythm: Normal rate and regular rhythm.      Pulses:           Dorsalis pedis pulses are 1+ on the right side and 1+ on the left side.        Posterior tibial pulses are 0 on the right side and 0 on the left side.   Pulmonary:      Effort: Pulmonary effort is normal. No respiratory distress.     Musculoskeletal:         General: Deformity present. No signs of injury.      Right lower leg: Edema present.      Left lower leg: Edema present.      Right foot: No foot drop.      Left foot: No foot drop.      Comments: S/P partial foot amputation right.     Skin:     General: Skin is warm.      Capillary Refill: Capillary refill takes less than 2 seconds.      Coloration: Skin is not cyanotic or mottled.      Findings: No abscess.      Nails: There is no clubbing.       Comments: Wound on left 3rd toe is covered with superficial eschar.  Minimal keratosis in right plantar foot.  Wound remains healed.  See wound assessment and photo.     Neurological:      General: No focal deficit present.      Mental Status: He is alert and oriented to person, place, and time.      Cranial Nerves: No cranial nerve deficit.      Sensory: Sensory deficit present.      Motor: No weakness.      Coordination: Coordination normal.     Psychiatric:         Mood and Affect: Mood normal.         Behavior: Behavior normal.         Thought Content: Thought content normal.         Judgment: Judgment normal.           Wound 12/16/24 Diabetic Ulcer Foot Right;Plantar (Active)   Wound Image   06/09/25 0956   Enter Parks score: Parks Grade 1: Partial or full-thickness ulcer (superficial) 06/09/25 0956   Wound Description Epithelialization 06/09/25 0956   Non-staged Wound Description Full thickness 06/02/25 1011   Wound Length (cm) 0 cm 06/09/25 0956   Wound Width (cm) 0 cm 06/09/25 0956   Wound Depth (cm) 0 cm 06/09/25 0956   Wound Surface Area (cm^2) 0 cm^2 06/09/25 0956   Wound Volume (cm^3) 0 cm^3 06/09/25 0956   Calculated Wound Volume (cm^3) 0 cm^3 06/09/25 0956   Change in Wound Size % 100 06/09/25 0956   Number of underminings 1 04/21/25 1015   Undermining 1 0 05/12/25 1102   Undermining 1 is depth extending from resolved 05/12/25 1102   Drainage Amount None 06/09/25 0956   Drainage Description Tan 05/12/25 1102   Lavern-wound Assessment Intact 06/09/25 0956   Wound packed? No 06/09/25 0956   Dressing Status Removed 05/28/25 0958       Wound 12/30/24 Diabetic Ulcer Toe D3, third Anterior;Left (Active)   Wound Image   06/09/25 0956   Enter Parks score: Parks Grade 1: Partial or full-thickness ulcer (superficial) 06/09/25 0957   Wound Description Dry;Eschar;Brown 06/09/25 0957   Non-staged Wound Description Full thickness 05/28/25 0957   Wound Length (cm) 0.1 cm 06/09/25 0957   Wound Width (cm) 0.1 cm  06/09/25 0957   Wound Depth (cm) 0 cm 06/09/25 0957   Wound Surface Area (cm^2) 0.01 cm^2 06/09/25 0957   Wound Volume (cm^3) 0 cm^3 06/09/25 0957   Calculated Wound Volume (cm^3) 0 cm^3 06/09/25 0957   Change in Wound Size % 100 06/09/25 0957   Number of underminings 1 02/17/25 0955   Undermining 1 0 03/10/25 1053   Undermining 1 is depth extending from resolved 03/10/25 1053   Drainage Amount None 06/09/25 0957   Drainage Description Serosanguineous 05/28/25 0957   Lavern-wound Assessment Intact;Dry 06/09/25 0957   Wound packed? No 06/09/25 0957   Dressing Status Removed 05/28/25 0957                               Results from last 6 Months   Lab Units 02/17/25  1118   WOUND CULTURE  2+ Growth of Staphylococcus aureus*         Lab Results   Component Value Date    HGBA1C 7.2 (A) 04/28/2025       Wound Instructions:  Orders Placed This Encounter   Procedures    Wound cleansing and dressings Diabetic Ulcer Anterior;Left Toe D3, third     Shower, No. Do note get dressing wet. Sponge bathe only.        Left 3rd toe Wound:  STOP acticoat.  Cover with dry gauze.  Secure with rolled gauze & tape  Change 3 x weekly & as needed for soilage & dislodgment at the wound center.        Continue wearing surgical shoe when ambulating. Limit your walking.        Watch for signs of infection these include a fever of 100.4°F (38°C) or higher, chills  Signs of wound infection include increasing swelling, redness, warmth, pain around the wound. Foul smell coming from wound  Go to the closest Emergency Room with signs of infection to be evaluated.               Follow up at the wound center in two weeks.     Standing Status:   Future     Expiration Date:   6/16/2025    Wound cleansing and dressings Diabetic Ulcer Right;Plantar Foot     Right foot wound is healed.   Keep covered with a silicon bordered foam for protection. Change three times a week.       Shower, yes, only on the days you change the dressings. Do not scrub healed area.  Pat dry.        Continue wearing diabetic shoe. Limit your walking.     Follow up at the wound center in two weeks.     Standing Status:   Future     Expiration Date:   6/16/2025    Wound Procedure Treatment Diabetic Ulcer Right;Plantar Foot     This order was created via procedure documentation    Wound Procedure Treatment Diabetic Ulcer Anterior;Left Toe D3, third     This order was created via procedure documentation             Satnam Ponce DPM

## 2025-06-09 NOTE — PROGRESS NOTES
Wound Procedure Treatment Diabetic Ulcer Anterior;Left Toe D3, third    Performed by: Tanner Nielson RN  Authorized by: Satnam Ponce DPM  Associated wounds:   Wound 12/30/24 Diabetic Ulcer Toe D3, third Anterior;Left    Wound cleansed with:  NSS   Applied secondary dressing:  Gauze   Dressing secured with:  Tape   Offloading device appllied:  Surgical shoe

## 2025-06-17 ENCOUNTER — RA CDI HCC (OUTPATIENT)
Dept: OTHER | Facility: HOSPITAL | Age: 75
End: 2025-06-17

## 2025-06-17 NOTE — PROGRESS NOTES
HCC coding opportunities       Chart reviewed, no opportunity found: CHART REVIEWED, NO OPPORTUNITY FOUND   No evidence of HF in chart     [facility] AUDIT         Patients Insurance     Medicare Insurance: [employer] Advantage

## 2025-06-19 ENCOUNTER — NURSE TRIAGE (OUTPATIENT)
Age: 75
End: 2025-06-19

## 2025-06-19 NOTE — TELEPHONE ENCOUNTER
"REASON FOR CONVERSATION: Wound    SYMPTOMS:  new wound on R foot     OTHER HEALTH INFORMATION: hx diabetes, had toes amputated, healed ulcer on same foot last seen 6/9    PROTOCOL DISPOSITION: See Today or Tomorrow in Office    CARE ADVICE PROVIDED: how to properly cleanse wound- Patient has no pain     PRACTICE FOLLOW-UP: apt scheduled for 6/20 8 am      Ulcer heel R foot - healed     New area of skin   Inside of foot     Wound care 6/23 apt     Reason for Disposition   Has diabetes (diabetes mellitus) and has minor cut or scratch on foot    Answer Assessment - Initial Assessment Questions  1. APPEARANCE of INJURY: \"What does the injury look like?\"       Red, open \"with white skin over it\" small amount of discharge clear unsure if odorous    2. ONSET: \"How long ago did the injury occur?\"       Noticed two days ago   3. LOCATION: \"Where is the injury located?\"       R foot on the anterior side   4. SIZE: \"How large is the cut?\"       Size of half dollar   5. BLEEDING: \"Is it bleeding now?\" If Yes, ask: \"Is it difficult to stop?\"       No  6. PAIN: \"Is there any pain?\" If Yes, ask: \"How bad is the pain?\" (Scale 0-10; or none, mild, moderate, severe)      No pain - due to neuropathy   7. MECHANISM: \"Tell me how it happened.\"       Unsure   8. TETANUS: \"When was the last tetanus booster?\"      Unsure   9. PREGNANCY: \"Is there any chance you are pregnant?\" \"When was your last menstrual period?\"      N?a    Protocols used: Skin Injury-Adult-[place]    "

## 2025-06-20 ENCOUNTER — OFFICE VISIT (OUTPATIENT)
Dept: FAMILY MEDICINE CLINIC | Facility: CLINIC | Age: 75
End: 2025-06-20
Payer: COMMERCIAL

## 2025-06-20 VITALS
BODY MASS INDEX: 30.23 KG/M2 | WEIGHT: 256 LBS | DIASTOLIC BLOOD PRESSURE: 100 MMHG | SYSTOLIC BLOOD PRESSURE: 164 MMHG | TEMPERATURE: 98 F | OXYGEN SATURATION: 100 % | HEART RATE: 50 BPM | HEIGHT: 77 IN

## 2025-06-20 DIAGNOSIS — L03.115 CELLULITIS OF RIGHT LOWER EXTREMITY: Primary | ICD-10-CM

## 2025-06-20 PROCEDURE — 99214 OFFICE O/P EST MOD 30 MIN: CPT | Performed by: FAMILY MEDICINE

## 2025-06-20 RX ORDER — SULFAMETHOXAZOLE AND TRIMETHOPRIM 800; 160 MG/1; MG/1
1 TABLET ORAL 2 TIMES DAILY
Qty: 14 TABLET | Refills: 0 | Status: SHIPPED | OUTPATIENT
Start: 2025-06-20 | End: 2025-06-27

## 2025-06-20 NOTE — PROGRESS NOTES
"Name: Won Gaitan Jr.      : 1950      MRN: [id number]  Encounter Provider: Pieter Torres DO  Encounter Date: 2025   Encounter department: [facility]  :  Assessment & Plan  Cellulitis of right lower extremity  Right lower extremity stump wound along with left lower leg lateral wound.  Appears to be cellulitic in nature.  Bactrim twice daily for 7 days  Sees wound care next week.  Advised patient to look at her red flags which she is aware of.  Orders:  •  sulfamethoxazole-trimethoprim (BACTRIM DS) 800-160 mg per tablet; Take 1 tablet by mouth in the morning and 1 tablet before bedtime. Do all this for 7 days.           History of Present Illness   Patient coming in with left lower extremity leg wound.      Review of Systems   Constitutional:  Negative for chills and fever.   HENT:  Negative for ear pain and sore throat.    Eyes:  Negative for pain and visual disturbance.   Respiratory:  Negative for cough and shortness of breath.    Cardiovascular:  Negative for chest pain and palpitations.   Gastrointestinal:  Negative for abdominal pain and vomiting.   Genitourinary:  Negative for dysuria and hematuria.   Musculoskeletal:  Negative for arthralgias and back pain.   Skin:  Positive for rash and wound. Negative for color change.   Neurological:  Negative for seizures and syncope.   All other systems reviewed and are negative.      Objective   /100 (BP Location: Left arm, Patient Position: Sitting, Cuff Size: Adult)   Pulse (!) 50   Temp 98 °F (36.7 °C) (Temporal)   Ht 6' 5\" (1.956 m)   Wt 116 kg (256 lb)   SpO2 100%   BMI 30.36 kg/m²      Physical Exam  Vitals reviewed.   Constitutional:       General: He is not in acute distress.     Appearance: He is well-developed.   HENT:      Head: Normocephalic and atraumatic.     Eyes:      Conjunctiva/sclera: Conjunctivae normal.       Cardiovascular:      Rate and Rhythm: Normal rate and regular rhythm.      Heart " sounds: No murmur heard.  Pulmonary:      Effort: Pulmonary effort is normal. No respiratory distress.      Breath sounds: Normal breath sounds.   Abdominal:      Palpations: Abdomen is soft.      Tenderness: There is no abdominal tenderness.     Musculoskeletal:         General: No swelling.      Cervical back: Neck supple.     Skin:     General: Skin is warm and dry.      Capillary Refill: Capillary refill takes less than 2 seconds.      Findings: Erythema and lesion present.      Comments: See pictures attached     Neurological:      Mental Status: He is alert.     Psychiatric:         Mood and Affect: Mood normal.                 Administrative Statements   I have spent a total time of 34 minutes in caring for this patient on the day of the visit/encounter including Diagnostic results, Prognosis, Risks and benefits of tx options, Instructions for management, Patient and family education, Importance of tx compliance, Risk factor reductions, Impressions, Counseling / Coordination of care, Documenting in the medical record, Reviewing/placing orders in the medical record (including tests, medications, and/or procedures), and Obtaining or reviewing history  .

## 2025-06-23 ENCOUNTER — OFFICE VISIT (OUTPATIENT)
Dept: WOUND CARE | Facility: CLINIC | Age: 75
End: 2025-06-23
Payer: COMMERCIAL

## 2025-06-23 VITALS
TEMPERATURE: 97.1 F | HEART RATE: 74 BPM | DIASTOLIC BLOOD PRESSURE: 98 MMHG | RESPIRATION RATE: 16 BRPM | SYSTOLIC BLOOD PRESSURE: 165 MMHG

## 2025-06-23 DIAGNOSIS — E11.40 TYPE 2 DIABETES MELLITUS WITH DIABETIC NEUROPATHY, UNSPECIFIED WHETHER LONG TERM INSULIN USE (HCC): ICD-10-CM

## 2025-06-23 DIAGNOSIS — L97.412 ULCER OF HEEL AND MIDFOOT, RIGHT, WITH FAT LAYER EXPOSED (HCC): ICD-10-CM

## 2025-06-23 DIAGNOSIS — L97.521 ULCER OF TOE OF LEFT FOOT, LIMITED TO BREAKDOWN OF SKIN (HCC): Primary | ICD-10-CM

## 2025-06-23 DIAGNOSIS — L03.115 CELLULITIS OF RIGHT LOWER EXTREMITY: ICD-10-CM

## 2025-06-23 DIAGNOSIS — I87.311 CHRONIC VENOUS HYPERTENSION (IDIOPATHIC) WITH ULCER OF RIGHT LOWER EXTREMITY (CODE) (HCC): ICD-10-CM

## 2025-06-23 PROCEDURE — 11042 DBRDMT SUBQ TIS 1ST 20SQCM/<: CPT | Performed by: PODIATRIST

## 2025-06-23 PROCEDURE — 99213 OFFICE O/P EST LOW 20 MIN: CPT | Performed by: PODIATRIST

## 2025-06-23 NOTE — PATIENT INSTRUCTIONS
Orders Placed This Encounter   Procedures    Wound cleansing and dressings     Wash your hands with soap and water.    Remove old dressing, discard into plastic bag and place in trash.    Cleanse the wound with Mild Soap (like unscented Dove) & Water prior to applying a clean dressing.   Do not use tissue or cotton balls. Do not scrub the wound. Pat dry using gauze.    Apply Silver Alginate to the wound.  Cover with gauze.   Secure with ava & tape  Change dressing 3 x weekly.     Please try to consume 3-4 servings of protein daily.   - Each serving of protein should contain about 30 mg protein.   - Good sources of protein include, lean meats (fish, chicken, etc), eggs, dairy products (yogurt, cheese), tofu, legumes (chickpeas, lentils, peas, black beans), nuts (cashew, walnut, peanuts, etc), & quinoa.     [facility] in [facility] week.     Standing Status:   Future     Expiration Date:   6/30/2025    Wound cleansing and dressings Diabetic Ulcer Anterior;Left Toe D3, third     Apply dry dressing, secure with tape.    Change 3 x weekly & as needed for soilage or dislodgement.     Standing Status:   Future     Expiration Date:   6/30/2025

## 2025-06-23 NOTE — PROGRESS NOTES
Patient ID: Won Gaitan Jr. is a 75 y.o. male Date of Birth 1950       Chief Complaint   Patient presents with    Follow Up Wound Care Visit     Follow up visit for wound to right foot.  Pt reports he has new wound to right medial ankle.  He was seen by his PCP for same, they started him on Bactrim for possible cellulitis.  Pt reports stretching pain likely related to swelling in same.  This has improved since abx. .       Allergies:  Simvastatin and Itraconazole    Assessments:      Diagnosis ICD-10-CM Associated Orders   1. Ulcer of toe of left foot, limited to breakdown of skin ([facility])  L97.521 Wound cleansing and dressings Diabetic Ulcer Anterior;Left Toe D3, third     Wound Procedure Treatment Diabetic Ulcer Anterior;Left Toe D3, third     Wound cleansing and dressings     VAS ARTERIAL DUPLEX- LOWER LIMB BILATERAL      2. Ulcer of heel and midfoot, right, with fat layer exposed ([facility])  L97.412 Wound cleansing and dressings Diabetic Ulcer Anterior;Left Toe D3, third     Wound Procedure Treatment Diabetic Ulcer Right;Plantar Foot     Wound Procedure Treatment Right;Medial Ankle     Wound cleansing and dressings     Debridement Diabetic Ulcer Right;Plantar Foot     VAS ARTERIAL DUPLEX- LOWER LIMB BILATERAL      3. Type 2 diabetes mellitus with diabetic neuropathy, unspecified whether long term insulin use ([facility])  E11.40 Wound Procedure Treatment Diabetic Ulcer Anterior;Left Toe D3, third     Debridement Diabetic Ulcer Right;Plantar Foot     VAS ARTERIAL DUPLEX- LOWER LIMB BILATERAL      4. Chronic venous hypertension (idiopathic) with ulcer of right lower extremity (CODE) ([facility])  I87.311       5. Cellulitis of right lower extremity  L03.115              Plan:  Reviewed medical records.  Patient was counseled and educated on the condition and the diagnosis.   2.  The diagnosis, treatment options and prognosis were discussed with the patient.    3.   Recurring wound on right foot.  He has cellulitis of right  "LE with new ulcer in right lower leg.  Continue oral antibiotics.  Dry dressing on left 3rd toe.  Stressed on pt compliance about proper off-loading and staying off of his feet.  Discussed level of activity.  4. Will send him for LEAD.  5. RA in 1 week.         Imaging: I have personally reviewed pertinent films in PACS  Labs, pathology, and Other Studies: I have personally reviewed pertinent reports.        Debridement   Wound 12/16/24 Diabetic Ulcer Foot Right;Plantar     Date/Time: 6/23/2025 9:30 AM    Universal Protocol:  Consent: Verbal consent obtained  Risks and benefits: risks, benefits and alternatives were discussed  Consent given by: patient  Time out: Immediately prior to procedure a \"time out\" was called to verify the correct patient, procedure, equipment, support staff and site/side marked as required.  Timeout called at: 6/23/2025 10:07 AM.  Patient understanding: patient states understanding of the procedure being performed  Patient identity confirmed: verbally with patient    Debridement Details  Performed by: physician  Debridement type: surgical  Level of debridement: subcutaneous tissue    Post-debridement measurements  Length (cm): 0.4  Width (cm): 0.2  Depth (cm): 0.1  Percent debrided: 100%  Surface Area (cm^2): 0.06  Area Debrided (cm^2): 0.06  Volume (cm^3): 0    Tissue and other material debrided: dermis, epidermis and subcutaneous tissue  Devitalized tissue debrided: fibrin and slough  Instrument(s) utilized: blade  Technique utilized: excisional  Bleeding: small  Hemostasis obtained with: pressure  Procedural pain (0-10): 0  Post-procedural pain: 0   Response to treatment: procedure was tolerated well           Subjective:     HPI  The patient presents for evaluation and treatment of wounds.  He developed new wound and increased redness in right leg last week.  He was seen by his PCP and put on oral antibiotics.  No pain.  He does not recall how it started, but reports quite a bit of " "activity.        The following portions of the patient's history were reviewed and updated as appropriate:   Patient Active Problem List   Diagnosis    Hyperlipidemia    Type 2 diabetes mellitus with diabetic neuropathy (HCC)    Atrial fibrillation (HCC)    Right middle lobe pulmonary nodule    Type 2 diabetes mellitus with other skin ulcer (CODE) ([facility])    Essential hypertension    Benign prostatic hyperplasia with lower urinary tract symptoms    Microalbuminuria    Ulcer of right foot with fat layer exposed (HCC)    Ambulatory dysfunction    S/P transmetatarsal amputation of foot, right (HCC)    Sleep disturbance    Anxiety and depression    S/P R ALT flap to right foot    Gait abnormality    Vasomotor rhinitis    Corns and callosities    Tinea unguium    Cellulitis of left lower leg    Puncture wound of left foot with foreign body, subsequent encounter    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 (HCC)    Personal history of COVID-19    Bronchitis    S/P amputation of lesser toe, left (HCC)    Ulcer of toe of left foot, limited to breakdown of skin (HCC)    Medication management    Subconjunctival hemorrhage of right eye    Ulcer of heel and midfoot, right, with fat layer exposed ([facility])    Uses self-applied continuous glucose monitoring device     Past Medical History:   Diagnosis Date    Arthritis     Atrial fibrillation (HCC)     Diagnosed 11/2018    Benign prostate hyperplasia 04/2002    Cellulitis     right lower leg     Colon polyp 2006    Diabetes mellitus (HCC)     Hearing aid worn     bilat    Hearing loss     90% loss left ear and 40% right ear    History of clubfoot     \"since birth\"    History of pneumonia     History of TIA (transient ischemic attack) 04/25/2017 11/30/18 pt denies    Hyperlipidemia 5/15/2014    Hypertension     Infectious viral hepatitis     \"cant remember type\"    Irregular heart beat     Afib    Neuropathy     both feet "    Osteomyelitis (HCC)     right great toe    Pneumonia     Right middle lobe pulmonary nodule 11/6/2018    Seasickness     Teeth missing     Type 2 diabetes mellitus with diabetic neuropathy (HCC) 11/6/2018    Wears glasses     reading    Wound, open     bottom of right foot     Past Surgical History:   Procedure Laterality Date    BUNIONECTOMY Right 12/04/2018    Procedure: 5TH METATARSAL BONE PARTIAL RESECTION, FULL THICKNESS DEBRIDEMENT OF DIABETIC ULCER;  Surgeon: Shala Flynn DPM;  [facility];  Service: Podiatry    CATARACT EXTRACTION Bilateral     CLOSURE DELAYED PRIMARY Left 10/30/2023    Procedure: CLOSURE DELAYED PRIMARY left foot;  Surgeon: Minh Urena DPM;  [facility];  Service: Podiatry    CLUB FOOT RELEASE Bilateral     COLONOSCOPY      COMPLEX WOUND CLOSURE TO EXTREMITY  04/27/2021    Procedure: COMPLEX WOUND CLOSURE TO EXTREMITY: RIGHT FOOT;  Surgeon: Niraj Bojorquez MD;  [facility];  Service: Plastics    EXTERNAL FIXATOR APPLICATION Right 02/12/2021    Procedure: Application multiplane external fixation;  Surgeon: Harry Zamudio DPM;  [facility];  Service: Podiatry    FOOT HARDWARE REMOVAL Right 02/17/2021    Procedure: REMOVAL EXTERNAL FIXATOR;  Surgeon: Harry Zamudio DPM;  [facility];  Service: Podiatry    FOREIGN BODY REMOVAL Right 04/27/2021    Procedure: REMOVAL FOREIGN BODY EXTREMITY: RIGHT FOOT;  Surgeon: Niraj Bojorquez MD;  [facility];  Service: Plastics    INCISION AND DRAINAGE OF WOUND Right 02/17/2021    Procedure: INCISION AND DRAINAGE (I&D) EXTREMITY;  Surgeon: Harry Zamudio DPM;  [facility];  Service: Podiatry    INCISION AND DRAINAGE OF WOUND Left 10/26/2023    Procedure: INCISION AND DRAINAGE (I&D) EXTREMITY; WASHOUT; REMOVAL OF FOREIGN BODY;  Surgeon: Satnam Ponce DPM;  [facility];  Service: Podiatry    ORIF FOOT FRACTURE Right 03/04/2021    Procedure: VAC PLACEMENT; SCREW  FIXATION RIGHT FOOT FUSION;  Surgeon: Harry Zamudio DPM;  [facility];  Service: Podiatry    AL AMPUTATION FOOT TRANSMETARSAL Right 02/22/2021    Procedure: AMPUTATION TRANSMETATARSAL (TMA), REMOVAL NAIL IM T2 ICF HARDWARE;  Surgeon: Harry Zamudio DPM;  [facility];  Service: Podiatry    AL AMPUTATION METATARSAL W/TOE SINGLE Left 11/29/2023    Procedure: LEFT FOURTH RAY RESECTION FOOT;  Surgeon: Harry Zamudio DPM;  [facility];  Service: Podiatry    AL ARTHRD MIDTARSL/TARS MLT/TRANSVRS W/OSTEOT Right 02/12/2021    Procedure: foot ARTHRODESIS/FUSION;  Surgeon: Harry Zamudio DPM;  [facility];  Service: Podiatry    AL DEBRIDEMENT BONE 1ST 20 SQ CM/< Right 12/02/2021    Procedure: DEBRIDEMENT OF TARSAL BONES WITH ANITBIODIC BEADS;  Surgeon: Harry Zamudio DPM;  [facility];  Service: Podiatry    AL LNGTH/SHRT TENDON LEG/ANKLE 1 TENDON SPX Right 02/12/2021    Procedure: LENGTHEN TIBIAL TENDON, TRANS HEEL CORD;  Surgeon: Harry Zamudio DPM;  [facility];  Service: Podiatry    AL MUSC MYOCUTANEOUS/FASCIOCUTANEOUS FLAP TRUNK Right 04/12/2021    Procedure: RECONSTRUCTION MICROSURGICAL W/ FREE FLAP;  Surgeon: Niraj Bojorquez MD;  [facility];  Service: Plastics    AL MUSC MYOCUTANEOUS/FASCIOCUTANEOUS FLAP TRUNK Right 04/12/2021    Procedure: TAKEBACK RECONSTRUCTION MICROSURGICAL W/ FREE FLAP;  Surgeon: Niraj Bojorquez MD;  [facility];  Service: Plastics    AL MUSC MYOCUTANEOUS/FASCIOCUTANEOUS FLAP TRUNK Right 04/13/2021    Procedure: RECONSTRUCTION MICROSURGICAL W/ FREE FLAP, RE EXPLORATION, MICRO VASCULAR REVISION;  Surgeon: Niraj Bojorquez MD;  [facility];  Service: Plastics    AL OSTC PRTL EXOSTC/CONDYLC METAR HEAD Right 12/29/2020    Procedure: EXCISION EXOSTOSIS;  Surgeon: Harry Zamudio DPM;  [facility];  Service: Podiatry    AL SECONDARY CLOSURE SURG WOUND/DEHSN XTNSV/COMP Right  2020    Procedure: PRIMARY DELAYED CLOSURE;  Surgeon: Harry Zamudio DPM;  [facility];  Service: Podiatry    TOE AMPUTATION Right     partial great toe    TONSILLECTOMY      VAC DRESSING APPLICATION Right 2021    Procedure: APPLICATION VAC DRESSING EXTREMITY;  Surgeon: Niraj Bojorquez MD;  [facility];  Service: Plastics    VASECTOMY  1992    WOUND DEBRIDEMENT Right 2021    Procedure: DEBRIDEMENT LOWER EXTREMITY (WASH OUT);  Surgeon: Niraj Bojorquez MD;  [facility];  Service: Plastics    WOUND DEBRIDEMENT Right 2021    Procedure: DEBRIDEMENT RIGHT FOOT;  Surgeon: Niraj Bojorquez MD;  [facility];  Service: Plastics    WOUND DEBRIDEMENT Right 2021    Procedure: DEBRIDEMENT LOWER EXTREMITY (WASH OUT);  Surgeon: Niraj Bojorquez MD;  [facility];  Service: Plastics    WOUND DEBRIDEMENT Right 2021    Procedure: DEBRIDEMENT LOWER EXTREMITY (WASH OUT): RIGHT FOOT;  Surgeon: Niraj Bojorquez MD;  [facility];  Service: Plastics     Family History   Problem Relation Name Age of Onset    Diabetes Father          mellitus      Social History     Socioeconomic History    Marital status: /Civil Union   Tobacco Use    Smoking status: Former     Current packs/day: 0.00     Types: Cigarettes     Quit date: 1988     Years since quittin.6     Passive exposure: Past    Smokeless tobacco: Former    Tobacco comments:     exposure to passive smoke   Vaping Use    Vaping status: Never Used   Substance and Sexual Activity    Alcohol use: Yes     Comment: occasionally    Drug use: Not Currently    Sexual activity: Yes     Partners: Female     Social Drivers of Health     Food Insecurity: No Food Insecurity (2025)    Nursing - Inadequate Food Risk Classification     Worried About Running Out of Food in the Last Year: Never true     Ran Out of Food in the Last Year: Never true     Ran  Out of Food in the Last Year: Never true   Transportation Needs: No Transportation Needs (2/18/2025)    Nursing - Transportation Risk Classification     Lack of Transportation: No   Intimate Partner Violence: Unknown (2/18/2025)    Nursing IPS     Physically Hurt by Someone: No     Hurt or Threatened by Someone: No   Housing Stability: Unknown (2/18/2025)    Nursing: Inadequate Housing Risk Classification     Unable to Pay for Housing in the Last Year: No     Has Housing: No        Current Outpatient Medications:     acetaminophen (Mapap Arthritis Pain) 650 mg CR tablet, Take 1 tablet (650 mg total) by mouth 2 (two) times a day (Patient not taking: Reported on 2/28/2025), Disp: 60 tablet, Rfl: 1    atorvastatin (LIPITOR) 40 mg tablet, Take 1 tablet (40 mg total) by mouth daily, Disp: 90 tablet, Rfl: 1    clopidogrel (Plavix) 75 mg tablet, Take 1 tablet (75 mg total) by mouth daily, Disp: 90 tablet, Rfl: 1    Continuous Glucose Sensor (FreeStyle Taylor 3 Sensor) MISC, Use 1 each every 14 (fourteen) days, Disp: 1 each, Rfl: 0    diltiazem (CARDIZEM) 120 MG tablet, Take 1 tablet (120 mg total) by mouth every 12 (twelve) hours, Disp: 180 tablet, Rfl: 1    escitalopram (LEXAPRO) 20 mg tablet, Take 1 tablet (20 mg total) by mouth daily, Disp: 90 tablet, Rfl: 0    glucose blood (Accu-Chek Guide) test strip, USE DAILY (Patient not taking: Reported on 2/17/2025), Disp: 100 strip, Rfl: 2    Insulin Glargine Solostar (Lantus SoloStar) 100 UNIT/ML SOPN, Inject 0.3 mL (30 Units total) under the skin daily at bedtime, Disp: 45 mL, Rfl: 1    insulin lispro (HumaLOG) 100 units/mL injection pen, Inject up to 20 Units under the skin 3 (three) times a day with meals, Disp: 15 mL, Rfl: 5    Insulin Pen Needle (Easy Comfort Pen Needles) 33G X 5 MM MISC, USE AS DIRECTED, Disp: 100 each, Rfl: 4    Lancets (onetouch ultrasoft) lancets, Use as instructed (Patient not taking: Reported on 2/17/2025), Disp: 100 each, Rfl: 3    Lancets (onetouch  ultrasoft) lancets, Use daily (Patient not taking: Reported on 6/20/2025), Disp: 100 each, Rfl: 3    lisinopril (ZESTRIL) 40 mg tablet, Take 1 tablet (40 mg total) by mouth daily, Disp: 90 tablet, Rfl: 1    metFORMIN (GLUCOPHAGE-XR) 750 mg 24 hr tablet, Take 1 tablet (750 mg total) by mouth 2 (two) times a day with meals, Disp: 180 tablet, Rfl: 1    metoprolol succinate (TOPROL-XL) 100 mg 24 hr tablet, TAKE 1 TABLET (100 MG TOTAL) BY MOUTH 2 (TWO) TIMES A DAY, Disp: 180 tablet, Rfl: 0    mupirocin (BACTROBAN) 2 % ointment, Apply topically 3 (three) times a day (Patient not taking: Reported on 2/17/2025), Disp: 15 g, Rfl: 0    promethazine (PHENERGAN) 12.5 mg/10 mL oral solution, Take 10 mL (12.5 mg total) by mouth every 6 (six) hours as needed (cough), Disp: 118 mL, Rfl: 0    sulfamethoxazole-trimethoprim (BACTRIM DS) 800-160 mg per tablet, Take 1 tablet by mouth in the morning and 1 tablet before bedtime. Do all this for 7 days., Disp: 14 tablet, Rfl: 0    tamsulosin (FLOMAX) 0.4 mg, Take 1 capsule (0.4 mg total) by mouth daily with dinner, Disp: 90 capsule, Rfl: 1    Review of Systems   Constitutional:  Negative for chills and fever.   Respiratory:  Negative for shortness of breath.    Cardiovascular:  Negative for chest pain.   Gastrointestinal:  Negative for nausea and vomiting.   Neurological:  Positive for numbness. Negative for weakness.       Objective:  /98   Pulse 74   Temp (!) 97.1 °F (36.2 °C) (Temporal)   Resp 16   Pain Score: 0-No pain     Physical Exam  Vitals and nursing note reviewed.   Constitutional:       General: He is not in acute distress.     Appearance: He is not toxic-appearing or diaphoretic.     Cardiovascular:      Rate and Rhythm: Normal rate and regular rhythm.      Pulses:           Dorsalis pedis pulses are 1+ on the right side and 1+ on the left side.        Posterior tibial pulses are 0 on the right side and 0 on the left side.   Pulmonary:      Effort: Pulmonary effort is  normal. No respiratory distress.     Musculoskeletal:         General: Deformity present. No signs of injury.      Right lower leg: Edema present.      Left lower leg: Edema present.      Right foot: No foot drop.      Left foot: No foot drop.      Comments: S/P partial foot amputation right.     Skin:     General: Skin is warm.      Capillary Refill: Capillary refill takes less than 2 seconds.      Coloration: Skin is not cyanotic or mottled.      Findings: No abscess.      Nails: There is no clubbing.      Comments: Wound on left 3rd toe is covered with superficial eschar.  No drainage.  There is a small area with maceration on right plantar foot with ulcer.  No deep probing.  VSU in right lower leg.  Wound bed is granular.  Mild redness and warmth in right leg.  See wound assessment and photo.     Neurological:      General: No focal deficit present.      Mental Status: He is alert and oriented to person, place, and time.      Cranial Nerves: No cranial nerve deficit.      Sensory: Sensory deficit present.      Motor: No weakness.      Coordination: Coordination normal.     Psychiatric:         Mood and Affect: Mood normal.         Behavior: Behavior normal.         Thought Content: Thought content normal.         Judgment: Judgment normal.             Wound 12/16/24 Diabetic Ulcer Foot Right;Plantar (Active)   Wound Image   06/23/25 1009   Enter Parks score: Parks Grade 1: Partial or full-thickness ulcer (superficial) 06/09/25 0956   Wound Description Epithelialization 06/23/25 0942   Non-staged Wound Description Full thickness 06/02/25 1011   Wound Length (cm) 0.1 cm 06/23/25 0942   Wound Width (cm) 0.1 cm 06/23/25 0942   Wound Depth (cm) 0.1 cm 06/23/25 0942   Wound Surface Area (cm^2) 0.01 cm^2 06/23/25 0942   Wound Volume (cm^3) 0.001 cm^3 06/23/25 0942   Calculated Wound Volume (cm^3) 0 cm^3 06/23/25 0942   Change in Wound Size % 100 06/23/25 0942   Number of underminings 1 04/21/25 1015   Undermining 1 0  05/12/25 1102   Undermining 1 is depth extending from resolved 05/12/25 1102   Drainage Amount None 06/23/25 0942   Drainage Description Tan 05/12/25 1102   Lavern-wound Assessment Intact 06/23/25 0942   Wound packed? No 06/09/25 0956   Dressing Status Removed 06/23/25 0942       Wound 12/30/24 Diabetic Ulcer Toe D3, third Anterior;Left (Active)   Wound Image   06/23/25 0947   Enter Parks score: Parks Grade 1: Partial or full-thickness ulcer (superficial) 06/23/25 0947   Wound Description Dry;Eschar;Brown 06/23/25 0947   Non-staged Wound Description Full thickness 05/28/25 0957   Wound Length (cm) 0.1 cm 06/23/25 0947   Wound Width (cm) 0.1 cm 06/23/25 0947   Wound Depth (cm) 0.1 cm 06/23/25 0947   Wound Surface Area (cm^2) 0.01 cm^2 06/23/25 0947   Wound Volume (cm^3) 0.001 cm^3 06/23/25 0947   Calculated Wound Volume (cm^3) 0 cm^3 06/23/25 0947   Change in Wound Size % 100 06/23/25 0947   Number of underminings 1 02/17/25 0955   Undermining 1 0 03/10/25 1053   Undermining 1 is depth extending from resolved 03/10/25 1053   Drainage Amount None 06/23/25 0947   Drainage Description Serosanguineous 05/28/25 0957   Lavern-wound Assessment Intact 06/23/25 0947   Wound packed? No 06/09/25 0957   Dressing Status Removed 06/23/25 0947       Wound 06/23/25 Diabetic Ulcer Ankle Right;Medial (Active)   Wound Image     06/23/25 0947   Enter Parks score: Parks Grade 1: Partial or full-thickness ulcer (superficial) 06/23/25 0947   Wound Description Pink 06/23/25 0947   Non-staged Wound Description Partial thickness 06/23/25 0947   Wound Length (cm) 3.2 cm 06/23/25 0947   Wound Width (cm) 4 cm 06/23/25 0947   Wound Depth (cm) 0.1 cm 06/23/25 0947   Wound Surface Area (cm^2) 10.05 cm^2 06/23/25 0947   Wound Volume (cm^3) 0.67 cm^3 06/23/25 0947   Calculated Wound Volume (cm^3) 1.28 cm^3 06/23/25 0947   Drainage Amount Moderate 06/23/25 0947   Drainage Description Serous;Yellow 06/23/25 0947   Lavern-wound Assessment Pink;Excoriated  06/23/25 0947   Dressing Status Removed 06/23/25 0947                                  Results from last 6 Months   Lab Units 02/17/25  1118   WOUND CULTURE  2+ Growth of Staphylococcus aureus*         Lab Results   Component Value Date    HGBA1C 7.2 (A) 04/28/2025       Wound Instructions:  Orders Placed This Encounter   Procedures    Wound cleansing and dressings Diabetic Ulcer Anterior;Left Toe D3, third     Apply dry dressing, secure with tape.    Change 3 x weekly & as needed for soilage or dislodgement.     Standing Status:   Future     Expiration Date:   6/30/2025    Wound Procedure Treatment Diabetic Ulcer Anterior;Left Toe D3, third     This order was created via procedure documentation    Wound Procedure Treatment Diabetic Ulcer Right;Plantar Foot     This order was created via procedure documentation    Wound Procedure Treatment Right;Medial Ankle     This order was created via procedure documentation    Wound cleansing and dressings     Wash your hands with soap and water.    Remove old dressing, discard into plastic bag and place in trash.    Cleanse the wound with Mild Soap (like unscented Dove) & Water prior to applying a clean dressing.   Do not use tissue or cotton balls. Do not scrub the wound. Pat dry using gauze.    Apply Silver Alginate to the wound.  Cover with gauze or ABD.   Secure with ava & tape  Change dressing 3 x weekly.     Please try to consume 3-4 servings of protein daily.   - Each serving of protein should contain about 30 mg protein.   - Good sources of protein include, lean meats (fish, chicken, etc), eggs, dairy products (yogurt, cheese), tofu, legumes (chickpeas, lentils, peas, black beans), nuts (cashew, walnut, peanuts, etc), & quinoa.     Follow up in [facility] in 1 week.     Standing Status:   Future     Expiration Date:   6/30/2025    Debridement Diabetic Ulcer Right;Plantar Foot     This order was created via procedure documentation             Satnam Ponce  DONOVAN

## 2025-06-23 NOTE — PROGRESS NOTES
Wound Procedure Treatment Diabetic Ulcer Anterior;Left Toe D3, third    Performed by: Pinky Gonzalez RN  Authorized by: Satnam Ponce DPM  Associated wounds:   Wound 12/30/24 Diabetic Ulcer Toe D3, third Anterior;Left    Applied secondary dressing:  Gauze   Dressing secured with:  Tape   Offloading device appllied:  Surgical shoe   Wound Procedure Treatment Diabetic Ulcer Right;Plantar Foot    Performed by: Pinky Gonzalez RN  Authorized by: Satnam Ponce DPM  Associated wounds:   Wound 12/16/24 Diabetic Ulcer Foot Right;Plantar    Applied primary dressing:  Silver and Calcium alginate   Applied secondary dressing:  Gauze   Dressing secured with:  Kacy and Tape   Offloading device appllied:  CROW   Wound Procedure Treatment Right;Medial Ankle    Performed by: Pinky Gonzalez RN  Authorized by: Satnam Ponce DPM  Associated wounds:   Wound 06/23/25 Diabetic Ulcer Ankle Right;Medial    Applied primary dressing:  Silver and Calcium alginate   Applied secondary dressing:  ABD   Dressing secured with:  Kacy, Tape, Elastic tubular stocking and Size F

## 2025-06-30 ENCOUNTER — OFFICE VISIT (OUTPATIENT)
Dept: WOUND CARE | Facility: CLINIC | Age: 75
End: 2025-06-30
Payer: COMMERCIAL

## 2025-06-30 ENCOUNTER — HOSPITAL ENCOUNTER (OUTPATIENT)
Dept: NON INVASIVE DIAGNOSTICS | Facility: HOSPITAL | Age: 75
Discharge: HOME | End: 2025-06-30
Attending: PODIATRIST
Payer: COMMERCIAL

## 2025-06-30 VITALS
HEART RATE: 80 BPM | SYSTOLIC BLOOD PRESSURE: 165 MMHG | DIASTOLIC BLOOD PRESSURE: 79 MMHG | TEMPERATURE: 97.8 F | RESPIRATION RATE: 18 BRPM

## 2025-06-30 DIAGNOSIS — I87.311 CHRONIC VENOUS HYPERTENSION (IDIOPATHIC) WITH ULCER OF RIGHT LOWER EXTREMITY (CODE) (HCC): ICD-10-CM

## 2025-06-30 DIAGNOSIS — L97.412 ULCER OF HEEL AND MIDFOOT, RIGHT, WITH FAT LAYER EXPOSED (HCC): ICD-10-CM

## 2025-06-30 DIAGNOSIS — L97.521 ULCER OF TOE OF LEFT FOOT, LIMITED TO BREAKDOWN OF SKIN (HCC): ICD-10-CM

## 2025-06-30 DIAGNOSIS — L97.521 ULCER OF TOE OF LEFT FOOT, LIMITED TO BREAKDOWN OF SKIN (HCC): Primary | ICD-10-CM

## 2025-06-30 DIAGNOSIS — E11.40 TYPE 2 DIABETES MELLITUS WITH DIABETIC NEUROPATHY, UNSPECIFIED WHETHER LONG TERM INSULIN USE (HCC): ICD-10-CM

## 2025-06-30 PROCEDURE — 93923 UPR/LXTR ART STDY 3+ LVLS: CPT

## 2025-06-30 PROCEDURE — 93922 UPR/L XTREMITY ART 2 LEVELS: CPT | Performed by: SURGERY

## 2025-06-30 PROCEDURE — 99213 OFFICE O/P EST LOW 20 MIN: CPT | Performed by: PODIATRIST

## 2025-06-30 PROCEDURE — 93925 LOWER EXTREMITY STUDY: CPT | Performed by: SURGERY

## 2025-06-30 PROCEDURE — 93925 LOWER EXTREMITY STUDY: CPT

## 2025-06-30 NOTE — PROGRESS NOTES
Patient ID: Won Gaitan Jr. is a 75 y.o. male Date of Birth 1950       Chief Complaint   Patient presents with    Follow Up Wound Care Visit     Follow up visit for wounds to BL feet. Pt denies any issues or concerns since last visit.  He also reports no changes to meds/allergies, denies any falls or hospitalizations.        Allergies:  Simvastatin and Itraconazole    Assessments:      Diagnosis ICD-10-CM Associated Orders   1. Ulcer of toe of left foot, limited to breakdown of skin (Formerly [facility])  L97.521 Wound cleansing and dressings Right;Medial Ankle     Wound cleansing and dressings Diabetic Ulcer Anterior;Left Toe D3, third     Wound Procedure Treatment Right;Medial Ankle     Wound Procedure Treatment Diabetic Ulcer Anterior;Left Toe D3, third      2. Ulcer of heel and midfoot, right, with fat layer exposed (Formerly [facility])  L97.412 Wound cleansing and dressings Right;Medial Ankle     Wound cleansing and dressings Diabetic Ulcer Anterior;Left Toe D3, third     Wound Procedure Treatment Right;Medial Ankle     Wound Procedure Treatment Diabetic Ulcer Anterior;Left Toe D3, third      3. Chronic venous hypertension (idiopathic) with ulcer of right lower extremity (CODE) (Formerly [facility])  I87.311 Wound cleansing and dressings Right;Medial Ankle     Wound cleansing and dressings Diabetic Ulcer Anterior;Left Toe D3, third     Wound Procedure Treatment Right;Medial Ankle     Wound Procedure Treatment Diabetic Ulcer Anterior;Left Toe D3, third      4. Type 2 diabetes mellitus with diabetic neuropathy, unspecified whether long term insulin use (Formerly [facility])  E11.40 Wound cleansing and dressings Right;Medial Ankle     Wound cleansing and dressings Diabetic Ulcer Anterior;Left Toe D3, third     Wound Procedure Treatment Right;Medial Ankle     Wound Procedure Treatment Diabetic Ulcer Anterior;Left Toe D3, third             Plan:  Reviewed medical records.  Patient was counseled and educated on the condition and the diagnosis.   2.  The diagnosis,  treatment options and prognosis were discussed with the patient.    3.   Right foot and left 3rd ulcer are healed.  Cellulitis resolved right leg.  Right leg ulcer is healing well.  Continue local care.  Protective dressings to foot wounds.  Discussed level of activity.    4. Resting and elevation of legs.    5. Awaits LEAD.    6. RA in 2 weeks.         Imaging: I have personally reviewed pertinent films in PACS  Labs, pathology, and Other Studies: I have personally reviewed pertinent reports.        Procedures       Subjective:     HPI  The patient presents for evaluation and treatment of wounds.  He is doing well and right leg wound looks better.  No drainage from right foot and left 3rd toe.  No new complaint.       The following portions of the patient's history were reviewed and updated as appropriate:   Patient Active Problem List   Diagnosis    Hyperlipidemia    Type 2 diabetes mellitus with diabetic neuropathy (Formerly [facility])    Atrial fibrillation (Formerly [facility])    Right middle lobe pulmonary nodule    Type 2 diabetes mellitus with other skin ulcer (CODE) (Formerly [facility])    Essential hypertension    Benign prostatic hyperplasia with lower urinary tract symptoms    Microalbuminuria    Ulcer of right foot with fat layer exposed (Formerly [facility])    Ambulatory dysfunction    S/P transmetatarsal amputation of foot, right (Formerly [facility])    Sleep disturbance    Anxiety and depression    S/P R ALT flap to right foot    Gait abnormality    Vasomotor rhinitis    Corns and callosities    Tinea unguium    Cellulitis of left lower leg    Puncture wound of left foot with foreign body, subsequent encounter    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 (Formerly [facility])    Personal history of COVID-19    Bronchitis    S/P amputation of lesser toe, left (Formerly [facility])    Ulcer of toe of left foot, limited to breakdown of skin (Formerly [facility])    Medication management    Subconjunctival hemorrhage of right eye    Ulcer of heel and midfoot,  "right, with fat layer exposed (HCC)    Uses self-applied continuous glucose monitoring device     Past Medical History:   Diagnosis Date    Arthritis     Atrial fibrillation (HCC)     Diagnosed 11/2018    Benign prostate hyperplasia 04/2002    Cellulitis     right lower leg     Colon polyp 2006    Diabetes mellitus (HCC)     Hearing aid worn     bilat    Hearing loss     90% loss left ear and 40% right ear    History of clubfoot     \"since birth\"    History of pneumonia     History of TIA (transient ischemic attack) 04/25/2017 11/30/18 pt denies    Hyperlipidemia 5/15/2014    Hypertension     Infectious viral hepatitis     \"cant remember type\"    Irregular heart beat     Afib    Neuropathy     both feet    Osteomyelitis (HCC)     right great toe    Pneumonia     Right middle lobe pulmonary nodule 11/6/2018    Seasickness     Teeth missing     Type 2 diabetes mellitus with diabetic neuropathy (HCC) 11/6/2018    Wears glasses     reading    Wound, open     bottom of right foot     Past Surgical History:   Procedure Laterality Date    BUNIONECTOMY Right 12/04/2018    Procedure: 5TH METATARSAL BONE PARTIAL RESECTION, FULL THICKNESS DEBRIDEMENT OF DIABETIC ULCER;  Surgeon: Shala Flynn DPM;  [facility];  Service: Podiatry    CATARACT EXTRACTION Bilateral     CLOSURE DELAYED PRIMARY Left 10/30/2023    Procedure: CLOSURE DELAYED PRIMARY left foot;  Surgeon: Minh Urena DPM;  [facility];  Service: Podiatry    CLUB FOOT RELEASE Bilateral     COLONOSCOPY      COMPLEX WOUND CLOSURE TO EXTREMITY  04/27/2021    Procedure: COMPLEX WOUND CLOSURE TO EXTREMITY: RIGHT FOOT;  Surgeon: Niraj Bojorquez MD;  [facility];  Service: Plastics    EXTERNAL FIXATOR APPLICATION Right 02/12/2021    Procedure: Application multiplane external fixation;  Surgeon: Harry Zamudio DPM;  [facility];  Service: Podiatry    FOOT HARDWARE REMOVAL Right 02/17/2021    Procedure: REMOVAL EXTERNAL " FIXATOR;  Surgeon: Harry Zamudio DPM;  [facility];  Service: Podiatry    FOREIGN BODY REMOVAL Right 04/27/2021    Procedure: REMOVAL FOREIGN BODY EXTREMITY: RIGHT FOOT;  Surgeon: Niraj Bojorquez MD;  [facility];  Service: Plastics    INCISION AND DRAINAGE OF WOUND Right 02/17/2021    Procedure: INCISION AND DRAINAGE (I&D) EXTREMITY;  Surgeon: Harry Zamudio DPM;  [facility];  Service: Podiatry    INCISION AND DRAINAGE OF WOUND Left 10/26/2023    Procedure: INCISION AND DRAINAGE (I&D) EXTREMITY; WASHOUT; REMOVAL OF FOREIGN BODY;  Surgeon: Satnam Ponce DPM;  [facility];  Service: Podiatry    ORIF FOOT FRACTURE Right 03/04/2021    Procedure: VAC PLACEMENT; SCREW FIXATION RIGHT FOOT FUSION;  Surgeon: Harry Zamudio DPM;  [facility];  Service: Podiatry    TN AMPUTATION FOOT TRANSMETARSAL Right 02/22/2021    Procedure: AMPUTATION TRANSMETATARSAL (TMA), REMOVAL NAIL IM T2 ICF HARDWARE;  Surgeon: Harry Zamudio DPM;  [facility];  Service: Podiatry    TN AMPUTATION METATARSAL W/TOE SINGLE Left 11/29/2023    Procedure: LEFT FOURTH RAY RESECTION FOOT;  Surgeon: Harry Zamudio DPM;  [facility];  Service: Podiatry    TN ARTHRD MIDTARSL/TARS MLT/TRANSVRS W/OSTEOT Right 02/12/2021    Procedure: foot ARTHRODESIS/FUSION;  Surgeon: Harry Zamudio DPM;  [facility];  Service: Podiatry    TN DEBRIDEMENT BONE 1ST 20 SQ CM/< Right 12/02/2021    Procedure: DEBRIDEMENT OF TARSAL BONES WITH ANITBIODIC BEADS;  Surgeon: Harry Zamudio DPM;  [facility];  Service: Podiatry    TN LNGTH/SHRT TENDON LEG/ANKLE 1 TENDON SPX Right 02/12/2021    Procedure: LENGTHEN TIBIAL TENDON, TRANS HEEL CORD;  Surgeon: Harry Zamudio DPM;  [facility];  Service: Podiatry    TN MUSC MYOCUTANEOUS/FASCIOCUTANEOUS FLAP TRUNK Right 04/12/2021    Procedure: RECONSTRUCTION MICROSURGICAL W/ FREE FLAP;  Surgeon: Niraj Bojorquez MD;  [facility];  Service: Plastics    VA MUSC MYOCUTANEOUS/FASCIOCUTANEOUS FLAP TRUNK Right 04/12/2021    Procedure: TAKEBACK RECONSTRUCTION MICROSURGICAL W/ FREE FLAP;  Surgeon: Niraj Bojorquez MD;  [facility];  Service: Plastics    VA MUSC MYOCUTANEOUS/FASCIOCUTANEOUS FLAP TRUNK Right 04/13/2021    Procedure: RECONSTRUCTION MICROSURGICAL W/ FREE FLAP, RE EXPLORATION, MICRO VASCULAR REVISION;  Surgeon: Niraj Bojorquez MD;  [facility];  Service: Plastics    VA OSTC PRTL EXOSTC/CONDYLC METAR HEAD Right 12/29/2020    Procedure: EXCISION EXOSTOSIS;  Surgeon: Harry Zamudio DPM;  [facility];  Service: Podiatry    VA SECONDARY CLOSURE SURG WOUND/DEHSN XTNSV/COMP Right 12/29/2020    Procedure: PRIMARY DELAYED CLOSURE;  Surgeon: Harry Zamudio DPM;  [facility];  Service: Podiatry    TOE AMPUTATION Right     partial great toe    TONSILLECTOMY      VAC DRESSING APPLICATION Right 03/01/2021    Procedure: APPLICATION VAC DRESSING EXTREMITY;  Surgeon: Niraj Bojorquez MD;  [facility];  Service: Plastics    VASECTOMY  1992    WOUND DEBRIDEMENT Right 03/01/2021    Procedure: DEBRIDEMENT LOWER EXTREMITY (WASH OUT);  Surgeon: Niraj Bojorquez MD;  [facility];  Service: Plastics    WOUND DEBRIDEMENT Right 04/07/2021    Procedure: DEBRIDEMENT RIGHT FOOT;  Surgeon: Niraj Bojorquez MD;  [facility];  Service: Plastics    WOUND DEBRIDEMENT Right 04/12/2021    Procedure: DEBRIDEMENT LOWER EXTREMITY (WASH OUT);  Surgeon: Niraj Bojorquez MD;  [facility];  Service: Plastics    WOUND DEBRIDEMENT Right 04/27/2021    Procedure: DEBRIDEMENT LOWER EXTREMITY (WASH OUT): RIGHT FOOT;  Surgeon: Niraj Bojorquez MD;  [facility];  Service: Plastics     Family History   Problem Relation Name Age of Onset    Diabetes Father          mellitus      Social History     Socioeconomic History    Marital status:  /Civil Union   Tobacco Use    Smoking status: Former     Current packs/day: 0.00     Types: Cigarettes     Quit date: 1988     Years since quittin.6     Passive exposure: Past    Smokeless tobacco: Former    Tobacco comments:     exposure to passive smoke   Vaping Use    Vaping status: Never Used   Substance and Sexual Activity    Alcohol use: Yes     Comment: occasionally    Drug use: Not Currently    Sexual activity: Yes     Partners: Female     Social Drivers of Health     Food Insecurity: No Food Insecurity (2025)    Nursing - Inadequate Food Risk Classification     Worried About Running Out of Food in the Last Year: Never true     Ran Out of Food in the Last Year: Never true     Ran Out of Food in the Last Year: Never true   Transportation Needs: No Transportation Needs (2025)    Nursing - Transportation Risk Classification     Lack of Transportation: No   Intimate Partner Violence: Unknown (2025)    Nursing IPS     Physically Hurt by Someone: No     Hurt or Threatened by Someone: No   Housing Stability: Unknown (2025)    Nursing: Inadequate Housing Risk Classification     Unable to Pay for Housing in the Last Year: No     Has Housing: No        Current Outpatient Medications:     acetaminophen (Mapap Arthritis Pain) 650 mg CR tablet, Take 1 tablet (650 mg total) by mouth 2 (two) times a day (Patient not taking: Reported on 2025), Disp: 60 tablet, Rfl: 1    atorvastatin (LIPITOR) 40 mg tablet, Take 1 tablet (40 mg total) by mouth daily, Disp: 90 tablet, Rfl: 1    clopidogrel (Plavix) 75 mg tablet, Take 1 tablet (75 mg total) by mouth daily, Disp: 90 tablet, Rfl: 1    Continuous Glucose Sensor (FreeStyle Taylor 3 Sensor) MISC, Use 1 each every 14 (fourteen) days, Disp: 1 each, Rfl: 0    diltiazem (CARDIZEM) 120 MG tablet, Take 1 tablet (120 mg total) by mouth every 12 (twelve) hours, Disp: 180 tablet, Rfl: 1    escitalopram (LEXAPRO) 20 mg tablet, Take 1 tablet (20 mg  total) by mouth daily, Disp: 90 tablet, Rfl: 0    glucose blood (Accu-Chek Guide) test strip, USE DAILY (Patient not taking: Reported on 2/17/2025), Disp: 100 strip, Rfl: 2    Insulin Glargine Solostar (Lantus SoloStar) 100 UNIT/ML SOPN, Inject 0.3 mL (30 Units total) under the skin daily at bedtime, Disp: 45 mL, Rfl: 1    insulin lispro (HumaLOG) 100 units/mL injection pen, Inject up to 20 Units under the skin 3 (three) times a day with meals, Disp: 15 mL, Rfl: 5    Insulin Pen Needle (Easy Comfort Pen Needles) 33G X 5 MM MISC, USE AS DIRECTED, Disp: 100 each, Rfl: 4    Lancets (onetouch ultrasoft) lancets, Use as instructed (Patient not taking: Reported on 2/17/2025), Disp: 100 each, Rfl: 3    Lancets (onetouch ultrasoft) lancets, Use daily (Patient not taking: Reported on 6/20/2025), Disp: 100 each, Rfl: 3    lisinopril (ZESTRIL) 40 mg tablet, Take 1 tablet (40 mg total) by mouth daily, Disp: 90 tablet, Rfl: 1    metFORMIN (GLUCOPHAGE-XR) 750 mg 24 hr tablet, Take 1 tablet (750 mg total) by mouth 2 (two) times a day with meals, Disp: 180 tablet, Rfl: 1    metoprolol succinate (TOPROL-XL) 100 mg 24 hr tablet, TAKE 1 TABLET (100 MG TOTAL) BY MOUTH 2 (TWO) TIMES A DAY, Disp: 180 tablet, Rfl: 0    mupirocin (BACTROBAN) 2 % ointment, Apply topically 3 (three) times a day (Patient not taking: Reported on 2/17/2025), Disp: 15 g, Rfl: 0    promethazine (PHENERGAN) 12.5 mg/10 mL oral solution, Take 10 mL (12.5 mg total) by mouth every 6 (six) hours as needed (cough), Disp: 118 mL, Rfl: 0    tamsulosin (FLOMAX) 0.4 mg, Take 1 capsule (0.4 mg total) by mouth daily with dinner, Disp: 90 capsule, Rfl: 1    Review of Systems   Constitutional:  Negative for chills and fever.   Respiratory:  Negative for shortness of breath.    Cardiovascular:  Negative for chest pain.   Gastrointestinal:  Negative for nausea and vomiting.   Neurological:  Positive for numbness. Negative for weakness.       Objective:  /79   Pulse 80    Temp 97.8 °F (36.6 °C) (Tympanic)   Resp 18   Pain Score: 0-No pain     Physical Exam  Vitals and nursing note reviewed.   Constitutional:       General: He is not in acute distress.     Appearance: He is not toxic-appearing or diaphoretic.     Cardiovascular:      Rate and Rhythm: Normal rate and regular rhythm.      Pulses:           Dorsalis pedis pulses are 1+ on the right side and 1+ on the left side.        Posterior tibial pulses are 0 on the right side and 0 on the left side.   Pulmonary:      Effort: Pulmonary effort is normal. No respiratory distress.     Musculoskeletal:         General: Deformity present. No signs of injury.      Right lower leg: Edema present.      Left lower leg: Edema present.      Right foot: No foot drop.      Left foot: No foot drop.      Comments: S/P partial foot amputation right.     Skin:     General: Skin is warm.      Capillary Refill: Capillary refill takes less than 2 seconds.      Coloration: Skin is not cyanotic or mottled.      Findings: No abscess.      Nails: There is no clubbing.      Comments: Wound on left 3rd toe is covered with superficial eschar.  No drainage.  Wound healed right plantar foot.  Right leg ulcer is smaller without cellulitis.  Redness and warmth resolved. See wound assessment and photo.     Neurological:      General: No focal deficit present.      Mental Status: He is alert and oriented to person, place, and time.      Cranial Nerves: No cranial nerve deficit.      Sensory: Sensory deficit present.      Motor: No weakness.      Coordination: Coordination normal.     Psychiatric:         Mood and Affect: Mood normal.         Behavior: Behavior normal.         Thought Content: Thought content normal.         Judgment: Judgment normal.             Wound 12/16/24 Diabetic Ulcer Foot Right;Plantar (Active)   Wound Image   06/30/25 0939   Enter Parks score: Parks Grade 1: Partial or full-thickness ulcer (superficial) 06/09/25 0956   Wound Description  Epithelialization 06/30/25 0940   Non-staged Wound Description Full thickness 06/02/25 1011   Wound Length (cm) 0 cm 06/30/25 0940   Wound Width (cm) 0 cm 06/30/25 0940   Wound Depth (cm) 0 cm 06/30/25 0940   Wound Surface Area (cm^2) 0 cm^2 06/30/25 0940   Wound Volume (cm^3) 0 cm^3 06/30/25 0940   Calculated Wound Volume (cm^3) 0 cm^3 06/30/25 0940   Change in Wound Size % 100 06/30/25 0940   Number of underminings 1 04/21/25 1015   Undermining 1 0 05/12/25 1102   Undermining 1 is depth extending from resolved 05/12/25 1102   Drainage Amount None 06/30/25 0940   Drainage Description Tan 05/12/25 1102   Lavern-wound Assessment Callus 06/30/25 0940   Wound packed? No 06/09/25 0956   Dressing Status Removed 06/23/25 0942       Wound 12/30/24 Diabetic Ulcer Toe D3, third Anterior;Left (Active)   Wound Image   06/30/25 0938   Enter Parks score: Parks Grade 1: Partial or full-thickness ulcer (superficial) 06/23/25 0947   Wound Description Dry;Eschar;Brown 06/23/25 0947   Non-staged Wound Description Full thickness 05/28/25 0957   Wound Length (cm) 0.1 cm 06/23/25 0947   Wound Width (cm) 0.1 cm 06/23/25 0947   Wound Depth (cm) 0.1 cm 06/23/25 0947   Wound Surface Area (cm^2) 0.01 cm^2 06/23/25 0947   Wound Volume (cm^3) 0.001 cm^3 06/23/25 0947   Calculated Wound Volume (cm^3) 0 cm^3 06/23/25 0947   Change in Wound Size % 100 06/23/25 0947   Number of underminings 1 02/17/25 0955   Undermining 1 0 03/10/25 1053   Undermining 1 is depth extending from resolved 03/10/25 1053   Drainage Amount None 06/23/25 0947   Drainage Description Serosanguineous 05/28/25 0957   Lavern-wound Assessment Intact 06/23/25 0947   Wound packed? No 06/09/25 0957   Dressing Status Removed 06/23/25 0947       Wound 06/23/25 Vascular Ulcer Venous Ankle Right;Medial (Active)   Wound Image   06/30/25 0938   Enter Parks score: Parks Grade 1: Partial or full-thickness ulcer (superficial) 06/23/25 0947   Wound Description Pink;Epithelialization  06/30/25 0938   Non-staged Wound Description Full thickness 06/30/25 0938   Wound Length (cm) 1.5 cm 06/30/25 0938   Wound Width (cm) 1 cm 06/30/25 0938   Wound Depth (cm) 0.1 cm 06/30/25 0938   Wound Surface Area (cm^2) 1.18 cm^2 06/30/25 0938   Wound Volume (cm^3) 0.079 cm^3 06/30/25 0938   Calculated Wound Volume (cm^3) 0.15 cm^3 06/30/25 0938   Change in Wound Size % 88.28 06/30/25 0938   Drainage Amount Moderate 06/30/25 0938   Drainage Description Serous;Yellow 06/30/25 0938   Lavern-wound Assessment Pink;Excoriated 06/30/25 0938   Dressing Status Removed 06/30/25 0938                                   Results from last 6 Months   Lab Units 02/17/25  1118   WOUND CULTURE  2+ Growth of Staphylococcus aureus*         Lab Results   Component Value Date    HGBA1C 7.2 (A) 04/28/2025       Wound Instructions:  Orders Placed This Encounter   Procedures    Wound cleansing and dressings Right;Medial Ankle     Wash your hands with soap and water.    Remove old dressing, discard into plastic bag and place in trash.    Cleanse the wound with Mild Soap (like unscented Dove) & Water prior to applying a clean dressing.   Do not use tissue or cotton balls. Do not scrub the wound. Pat dry using gauze.     Apply Silver Alginate to the wound.  Cover with gauze or ABD.   Secure with ava & tape  Change dressing 3 x weekly.      Please try to consume 3-4 servings of protein daily.   - Each serving of protein should contain about 30 mg protein.   - Good sources of protein include, lean meats (fish, chicken, etc), eggs, dairy products (yogurt, cheese),      tofu, legumes (chickpeas, lentils, peas, black beans), nuts (cashew, walnut, peanuts, etc), & quinoa.      [facility] up [facility] in 2 weeks.     Standing Status:   Future     Expiration Date:   7/7/2025    Wound cleansing and dressings Diabetic Ulcer Anterior;Left Toe D3, third     Cleanse with mild soap (like unscented dove).  Pat dry.    Apply folded 4x4 gauze,  secure with tape.  Change every other day & as needed.     Standing Status:   Future     Expiration Date:   7/7/2025    Wound Procedure Treatment Right;Medial Ankle     This order was created via procedure documentation    Wound Procedure Treatment Diabetic Ulcer Anterior;Left Toe D3, third     This order was created via procedure documentation             Satnam Ponce DPM

## 2025-06-30 NOTE — PROGRESS NOTES
Wound Procedure Treatment Right;Medial Ankle    Performed by: Stephanie White RN  Authorized by: Satnam Ponce DPM  Associated wounds:   Wound 06/23/25 Vascular Ulcer Venous Ankle Right;Medial    Wound cleansed with:  NSS   Applied to periwound:  Steroid cream / ointment   Applied primary dressing:  Calcium alginate and Silver   Applied secondary dressing:  ABD   Dressing secured with:  Kacy, Tape, Size F and Elastic tubular stocking   Wound Procedure Treatment Diabetic Ulcer Anterior;Left Toe D3, third    Performed by: Stephanie White RN  Authorized by: Satnam Ponce DPM  Associated wounds:   Wound 12/30/24 Diabetic Ulcer Toe D3, third Anterior;Left    Wound cleansed with:  NSS   Applied secondary dressing:  Gauze   Dressing secured with:  Tape

## 2025-06-30 NOTE — PATIENT INSTRUCTIONS
Orders Placed This Encounter   Procedures    Wound cleansing and dressings Right;Medial Ankle     Wash your hands with soap and water.    Remove old dressing, discard into plastic bag and place in trash.    Cleanse the wound with Mild Soap (like unscented Dove) & Water prior to applying a clean dressing.   Do not use tissue or cotton balls. Do not scrub the wound. Pat dry using gauze.     Apply Silver Alginate to the wound.  Cover with gauze or ABD.   Secure with ava & tape  Change dressing 3 x weekly.      Please try to consume 3-4 servings of protein daily.   - Each serving of protein should contain about 30 mg protein.   - Good sources of protein include, lean meats (fish, chicken, etc), eggs, dairy products (yogurt, cheese),      tofu, legumes (chickpeas, lentils, peas, black beans), nuts (cashew, walnut, peanuts, etc), & quinoa.      Follow up in [facility] in 2 weeks.     Standing Status:   Future     Expiration Date:   7/7/2025    Wound cleansing and dressings Diabetic Ulcer Anterior;Left Toe D3, third     Cleanse with mild soap (like unscented dove).  Pat dry.    Apply folded 4x4 gauze, secure with tape.  Change every other day & as needed.     Standing Status:   Future     Expiration Date:   7/7/2025

## 2025-07-07 DIAGNOSIS — E11.40 TYPE 2 DIABETES MELLITUS WITH DIABETIC NEUROPATHY, WITHOUT LONG-TERM CURRENT USE OF INSULIN (HCC): ICD-10-CM

## 2025-07-08 RX ORDER — INSULIN LISPRO 100 [IU]/ML
INJECTION, SOLUTION INTRAVENOUS; SUBCUTANEOUS
Qty: 15 ML | Refills: 4 | Status: ON HOLD | OUTPATIENT
Start: 2025-07-08

## 2025-07-15 ENCOUNTER — TELEPHONE (OUTPATIENT)
Age: 75
End: 2025-07-15

## 2025-07-18 ENCOUNTER — HOSPITAL ENCOUNTER (INPATIENT)
Facility: HOSPITAL | Age: 75
LOS: 13 days | Discharge: HOME HEALTH | DRG: 854 | End: 2025-07-31
Attending: EMERGENCY MEDICINE | Admitting: INTERNAL MEDICINE
Payer: COMMERCIAL

## 2025-07-18 ENCOUNTER — APPOINTMENT (EMERGENCY)
Dept: RADIOLOGY | Facility: HOSPITAL | Age: 75
DRG: 854 | End: 2025-07-18
Payer: COMMERCIAL

## 2025-07-18 PROBLEM — E11.621 DIABETIC FOOT ULCER (HCC): Status: ACTIVE | Noted: 2025-07-18

## 2025-07-18 PROBLEM — L97.509 DIABETIC FOOT ULCER (HCC): Status: ACTIVE | Noted: 2025-07-18

## 2025-07-19 ENCOUNTER — APPOINTMENT (INPATIENT)
Dept: RADIOLOGY | Facility: HOSPITAL | Age: 75
DRG: 854 | End: 2025-07-19
Payer: COMMERCIAL

## 2025-07-22 ENCOUNTER — APPOINTMENT (INPATIENT)
Dept: NON INVASIVE DIAGNOSTICS | Facility: HOSPITAL | Age: 75
DRG: 854 | End: 2025-07-22
Payer: COMMERCIAL

## 2025-07-22 ENCOUNTER — ANESTHESIA EVENT (INPATIENT)
Dept: PERIOP | Facility: HOSPITAL | Age: 75
DRG: 854 | End: 2025-07-22
Payer: COMMERCIAL

## 2025-07-22 PROBLEM — I73.9 PAD (PERIPHERAL ARTERY DISEASE) (HCC): Status: ACTIVE | Noted: 2025-07-22

## 2025-07-22 PROBLEM — G89.18 ACUTE POSTOPERATIVE PAIN OF EXTREMITY: Status: ACTIVE | Noted: 2025-07-22

## 2025-07-22 NOTE — ANESTHESIA PREPROCEDURE EVALUATION
"Procedure:  AMPUTATION BELOW KNEE (BKA)-- RIGHT (Right: Leg Lower)    Relevant Problems   CARDIO   (+) Chronic atrial fibrillation   (+) Essential hypertension   (+) Hyperlipidemia   (+) PAD (peripheral artery disease) (HCC)      ENDO   (+) Type 2 diabetes mellitus with diabetic neuropathy (HCC)   (+) Type 2 diabetes mellitus with other skin ulcer (CODE) (HCC)      /RENAL   (+) Benign prostatic hyperplasia with lower urinary tract symptoms      NEURO/PSYCH   (+) Anxiety and depression   (+) Type 2 diabetes mellitus with diabetic neuropathy (HCC)        Physical Exam    Airway     Mallampati score: III  TM Distance: >3 FB  Neck ROM: limited extension  Mouth opening: >= 4 cm      Cardiovascular      Dental        Pulmonary   No wheezes    Neurological    He appears alert and oriented x3.      Other Findings  post-pubertal.      Anesthesia Plan  ASA Score- 3     Anesthesia Type- general with ASA Monitors.         Additional Monitors:     Airway Plan: LMA and LMA.    Comment: Right Fem-Pop blocks for Right BKA.       Plan Factors-    Chart reviewed. EKG reviewed. Imaging results reviewed. Existing labs reviewed. Patient summary reviewed.    Patient is not a current smoker.              Induction- intravenous.    Postoperative Plan- .   Monitoring Plan - Monitoring plan - standard ASA monitoring  Post Operative Pain Plan - peripheral nerve block and multimodal analgesia    Perioperative Resuscitation Plan - Level 1 - Full Code.       Informed Consent- Anesthetic plan and risks discussed with patient.  I personally reviewed this patient with the CRNA. Discussed and agreed on the Anesthesia Plan with the CRNA..      NPO Status:  No vitals data found for the desired time range.      VITALS  /81   Pulse 66   Temp 98.9 °F (37.2 °C)   Resp 14   Ht 6' 5\" (1.956 m)   Wt 116 kg (256 lb)   SpO2 95%   BMI 30.36 kg/m²  BP Readings from Last 3 Encounters:   07/22/25 137/81   06/30/25 165/79   06/23/25 165/98    "     LABS  Results from Last 12 Months   Lab Units 07/22/25  0518 07/21/25  0622   WBC Thousand/uL 6.25 7.07   HEMOGLOBIN g/dL 12.6 12.3   HEMATOCRIT % 38.7 38.4   PLATELETS Thousands/uL 270 237     Results from Last 12 Months   Lab Units 07/22/25  1300 07/22/25  0518 07/19/25  0131 07/18/25  1743   INR   --   --   --  1.17   PTT seconds 74* 58*   < > 37*    < > = values in this interval not displayed.    Results from Last 12 Months   Lab Units 07/22/25  0518 07/21/25  0622 07/20/25  0216 07/18/25  1121 04/28/25  1352 02/17/25  1818 01/16/25  0809   SODIUM mmol/L 139 139 136   < >  --    < > 140   POTASSIUM mmol/L 4.2 4.2 4.2   < >  --    < > 4.4   CHLORIDE mmol/L 108 108 104   < >  --    < > 103   CO2 mmol/L 25 22 23   < >  --    < > 29   ANION GAP mmol/L 6 9 9   < >  --    < > 8   BUN mg/dL 14 14 15   < >  --    < > 24   CREATININE mg/dL 0.85 0.93 1.11   < >  --    < > 1.05   CALCIUM mg/dL 8.6 8.4 8.3*   < >  --    < > 9.0   GLUCOSE RANDOM mg/dL 122 110 139   < >  --    < > 141*   MAGNESIUM mg/dL 2.0  --  2.5   < >  --   --   --    HEMOGLOBIN A1C   --   --   --   --  7.2*  --  7.7*    < > = values in this interval not displayed.        ECG      ECHOCARDIOGRAPHY OR OTHER TESTING/IMAGING  Encounter Date: 07/18/25   ECG 12 lead   Result Value    Ventricular Rate 65    Atrial Rate     CA Interval     QRSD Interval 102    QT Interval 418    QTC Interval 435    P Axis     QRS Axis 75    T Wave Axis 29    Narrative    Atrial fibrillation  Abnormal ECG  When compared with ECG of 18-Jul-2025 11:40,  Vent. rate has decreased by  36 bpm  Confirmed by Iker Marroquin ([id number]) on 7/20/2025 1:42:08 PM     No results found for this or any previous visit. History    Chronic A-Fib, Hyperlipidemia, Type 2 diabetes mellitus, Essential HTN     Interpretation Summary  Show Result Comparison     Left Ventricle: Left ventricular cavity size is normal. Wall thickness is mildly increased. There is mild concentric hypertrophy. The left  ventricular ejection fraction is 65%. Systolic function is normal. Wall motion is normal. Unable to assess diastolic function due to atrial fibrillation.    Right Ventricle: Right ventricular cavity size is normal. Systolic function is normal.    Left Atrium: The atrium is dilated.    Right Atrium: The atrium is dilated.    Mitral Valve: There is mild regurgitation.    Tricuspid Valve: There is mild regurgitation.    Pulmonic Valve: There is mild regurgitation.     ------- ANESTHESIA RISK-BENEFIT DISCUSSION -------  BENEFITS OF A SPECIALIZED ANESTHESIA TEAM INCLUDE (NBK [id number], PMID 98016910):  (1) Reduce mortality and morbidity for major surgeries/procedures. (2) The team provides analgesia/sedation/amnesia/akinesia as safely as possible. (3) The team strives to reduce discomfort as safely as possible.    RISKS, AND PLANS TO MITIGATE RISKS, INCLUDE:    - Neurologic system: IntraOp awareness (Risk is ~1:1,000 - 1:14,000; PMID 57028624), Stroke (Risk ~<0.1-2% for most cases; PMID 04110112), nerve injury, vision loss, and POCD.  Since regional anesthesia may be used, risks of block failure, bleeding, infection, and permanent or temporary nerve injury or injury to other local structures were discussed.  - Airway and Pulmonary system: Dental or mouth injury, throat pain, critical hypoxia, pneumothorax, prolonged intubation, post-op respiratory compromise.  Airway/Intubation risks and prior data:  Mask Ventilation: Mask ventilation not attempted (0); Brand: LMA; Size: 5; Insertion Attempts: 1; Placement Verify: Auscultation, End tidal CO2  Major ARISCAT risk factors for pulmonary complications include: none, yielding a score of 0-1= Low risk, 1.6%.  - Cardiovascular system: Hypotension, arrhythmias, cardiac injury or arrest, blood clots, bleeding, infection, vascular injuries.  Kris's RCRI score items: IDDM, yielding an RCRI Score of 1= 0.6% risk of MACE  Are michael-op or intra-op beta blockers indicated? (PMID 99715087):  yes, if patient has not already taken -- See MAR  - FEN/GI system: Aspiration risk (~0.5% per PMC 6944460) and PONV (10-80% per Apfel score) especially if the patient has not fasted.  ASA NPO guideline compliance?: Yes  - Medication risk assessment: Allergic reactions, excessive bleeding with anticoagulant use, overdoses, drug-drug interactions, injury to a fetus or  in pregnant or breastfeeding patients, michael-procedural sedation including while driving/operating machinery.  Recent relevant medications: See MAR or Med Review  Personal or family history of anesthesia complications: no  Pregnancy Status: N/A  - Estimate mortality risks associated with anesthesia based on ASA-PS (PMID 39794351): ASA-PS III: 1:3,500

## 2025-07-23 ENCOUNTER — ANESTHESIA (INPATIENT)
Dept: PERIOP | Facility: HOSPITAL | Age: 75
DRG: 854 | End: 2025-07-23
Payer: COMMERCIAL

## 2025-07-23 RX ORDER — LIDOCAINE HYDROCHLORIDE 10 MG/ML
INJECTION, SOLUTION EPIDURAL; INFILTRATION; INTRACAUDAL; PERINEURAL AS NEEDED
Status: DISCONTINUED | OUTPATIENT
Start: 2025-07-23 | End: 2025-07-23

## 2025-07-23 RX ORDER — EPHEDRINE SULFATE 50 MG/ML
INJECTION INTRAVENOUS AS NEEDED
Status: DISCONTINUED | OUTPATIENT
Start: 2025-07-23 | End: 2025-07-23

## 2025-07-23 RX ORDER — DEXAMETHASONE SODIUM PHOSPHATE 4 MG/ML
INJECTION, SOLUTION INTRA-ARTICULAR; INTRALESIONAL; INTRAMUSCULAR; INTRAVENOUS; SOFT TISSUE
Status: COMPLETED | OUTPATIENT
Start: 2025-07-23 | End: 2025-07-23

## 2025-07-23 RX ORDER — FENTANYL CITRATE 50 UG/ML
INJECTION, SOLUTION INTRAMUSCULAR; INTRAVENOUS AS NEEDED
Status: DISCONTINUED | OUTPATIENT
Start: 2025-07-23 | End: 2025-07-23

## 2025-07-23 RX ORDER — ONDANSETRON 2 MG/ML
INJECTION INTRAMUSCULAR; INTRAVENOUS AS NEEDED
Status: DISCONTINUED | OUTPATIENT
Start: 2025-07-23 | End: 2025-07-23

## 2025-07-23 RX ORDER — SODIUM CHLORIDE, SODIUM LACTATE, POTASSIUM CHLORIDE, CALCIUM CHLORIDE 600; 310; 30; 20 MG/100ML; MG/100ML; MG/100ML; MG/100ML
INJECTION, SOLUTION INTRAVENOUS CONTINUOUS PRN
Status: DISCONTINUED | OUTPATIENT
Start: 2025-07-23 | End: 2025-07-23

## 2025-07-23 RX ORDER — KETAMINE HCL IN NACL, ISO-OSM 100MG/10ML
SYRINGE (ML) INJECTION AS NEEDED
Status: DISCONTINUED | OUTPATIENT
Start: 2025-07-23 | End: 2025-07-23

## 2025-07-23 RX ORDER — BUPIVACAINE HYDROCHLORIDE 5 MG/ML
INJECTION, SOLUTION EPIDURAL; INTRACAUDAL; PERINEURAL
Status: COMPLETED | OUTPATIENT
Start: 2025-07-23 | End: 2025-07-23

## 2025-07-23 RX ORDER — KETOROLAC TROMETHAMINE 30 MG/ML
INJECTION, SOLUTION INTRAMUSCULAR; INTRAVENOUS AS NEEDED
Status: DISCONTINUED | OUTPATIENT
Start: 2025-07-23 | End: 2025-07-23

## 2025-07-23 RX ORDER — PROPOFOL 10 MG/ML
INJECTION, EMULSION INTRAVENOUS AS NEEDED
Status: DISCONTINUED | OUTPATIENT
Start: 2025-07-23 | End: 2025-07-23

## 2025-07-23 RX ORDER — BUPIVACAINE HYDROCHLORIDE 5 MG/ML
INJECTION, SOLUTION EPIDURAL; INTRACAUDAL; PERINEURAL
Status: DISCONTINUED | OUTPATIENT
Start: 2025-07-23 | End: 2025-07-23

## 2025-07-23 RX ADMIN — Medication 10 MG: at 14:02

## 2025-07-23 RX ADMIN — Medication 10 MG: at 12:18

## 2025-07-23 RX ADMIN — Medication 10 MG: at 13:19

## 2025-07-23 RX ADMIN — BUPIVACAINE HYDROCHLORIDE 20 ML: 5 INJECTION, SOLUTION EPIDURAL; INTRACAUDAL; PERINEURAL at 11:28

## 2025-07-23 RX ADMIN — FENTANYL CITRATE 25 MCG: 50 INJECTION INTRAMUSCULAR; INTRAVENOUS at 13:32

## 2025-07-23 RX ADMIN — EPHEDRINE SULFATE 2.5 MG: 50 INJECTION, SOLUTION INTRAVENOUS at 11:55

## 2025-07-23 RX ADMIN — FENTANYL CITRATE 25 MCG: 50 INJECTION INTRAMUSCULAR; INTRAVENOUS at 14:02

## 2025-07-23 RX ADMIN — DEXAMETHASONE SODIUM PHOSPHATE 4 MG: 4 INJECTION, SOLUTION INTRA-ARTICULAR; INTRALESIONAL; INTRAMUSCULAR; INTRAVENOUS; SOFT TISSUE at 11:25

## 2025-07-23 RX ADMIN — PHENYLEPHRINE HYDROCHLORIDE 20 MCG/MIN: 50 INJECTION INTRAVENOUS at 11:49

## 2025-07-23 RX ADMIN — PROPOFOL 180 MG: 10 INJECTION, EMULSION INTRAVENOUS at 11:39

## 2025-07-23 RX ADMIN — BUPIVACAINE HYDROCHLORIDE 20 ML: 5 INJECTION, SOLUTION EPIDURAL; INTRACAUDAL; PERINEURAL at 15:17

## 2025-07-23 RX ADMIN — BUPIVACAINE HYDROCHLORIDE 20 ML: 5 INJECTION, SOLUTION EPIDURAL; INTRACAUDAL; PERINEURAL at 11:25

## 2025-07-23 RX ADMIN — EPHEDRINE SULFATE 5 MG: 50 INJECTION, SOLUTION INTRAVENOUS at 12:54

## 2025-07-23 RX ADMIN — DEXMEDETOMIDINE HYDROCHLORIDE 4 MCG: 100 INJECTION, SOLUTION INTRAVENOUS at 12:10

## 2025-07-23 RX ADMIN — EPHEDRINE SULFATE 5 MG: 50 INJECTION, SOLUTION INTRAVENOUS at 12:49

## 2025-07-23 RX ADMIN — ONDANSETRON 4 MG: 2 INJECTION INTRAMUSCULAR; INTRAVENOUS at 13:13

## 2025-07-23 RX ADMIN — LIDOCAINE HYDROCHLORIDE 100 MG: 10 INJECTION, SOLUTION EPIDURAL; INFILTRATION; INTRACAUDAL; PERINEURAL at 11:39

## 2025-07-23 RX ADMIN — FENTANYL CITRATE 100 MCG: 50 INJECTION INTRAMUSCULAR; INTRAVENOUS at 11:39

## 2025-07-23 RX ADMIN — SODIUM CHLORIDE, SODIUM LACTATE, POTASSIUM CHLORIDE, AND CALCIUM CHLORIDE: .6; .31; .03; .02 INJECTION, SOLUTION INTRAVENOUS at 11:35

## 2025-07-23 RX ADMIN — CEFAZOLIN SODIUM 2000 MG: 2 SOLUTION INTRAVENOUS at 11:44

## 2025-07-23 RX ADMIN — DEXAMETHASONE SODIUM PHOSPHATE 4 MG: 4 INJECTION, SOLUTION INTRA-ARTICULAR; INTRALESIONAL; INTRAMUSCULAR; INTRAVENOUS; SOFT TISSUE at 11:28

## 2025-07-23 RX ADMIN — EPHEDRINE SULFATE 10 MG: 50 INJECTION, SOLUTION INTRAVENOUS at 12:03

## 2025-07-23 RX ADMIN — EPHEDRINE SULFATE 7.5 MG: 50 INJECTION, SOLUTION INTRAVENOUS at 11:59

## 2025-07-23 RX ADMIN — KETOROLAC TROMETHAMINE 15 MG: 30 INJECTION, SOLUTION INTRAMUSCULAR; INTRAVENOUS at 13:19

## 2025-07-23 RX ADMIN — FENTANYL CITRATE 50 MCG: 50 INJECTION INTRAMUSCULAR; INTRAVENOUS at 12:31

## 2025-07-23 RX ADMIN — Medication 20 MG: at 12:09

## 2025-07-23 NOTE — ANESTHESIA PROCEDURE NOTES
Peripheral Block    Patient location during procedure: holding area  Start time: 7/23/2025 3:17 PM  Reason for block: at surgeon's request and post-op pain management  Staffing  Performed by: Kulwant Todd MD PhD  Authorized by: Kulwant Todd MD PhD    Preanesthetic Checklist  Completed: patient identified, IV checked, site marked, risks and benefits discussed, surgical consent, monitors and equipment checked, pre-op evaluation and timeout performed  Peripheral Block  Prep: ChloraPrep  Patient monitoring: frequent blood pressure checks, continuous pulse oximetry and heart rate  Block type: Popliteal  Laterality: right  Injection technique: single-shot  Procedures: ultrasound guided, Ultrasound guidance required for the procedure to increase accuracy and safety of medication placement and decrease risk of complications.  Ultrasound permanent image saved  bupivacaine (PF) (MARCAINE) 0.5 % injection 20 mL - Perineural   20 mL - 7/23/2025 3:17:00 PM  Needle  Needle type: Stimuplex   Needle gauge: 18 G  Needle length: 4 in  Needle localization: anatomical landmarks and ultrasound guidance  Assessment  Injection assessment: incremental injection, frequent aspiration, injected with ease, negative aspiration, negative for heart rate change, no paresthesia on injection, no symptoms of intraneural/intravenous injection and needle tip visualized at all times  Paresthesia pain: none  Post-procedure:  site cleaned  patient tolerated the procedure well with no immediate complications

## 2025-07-23 NOTE — ANESTHESIA PROCEDURE NOTES
Peripheral Block    Patient location during procedure: holding area  Start time: 7/23/2025 11:28 AM  Reason for block: at surgeon's request and post-op pain management  Staffing  Performed by: Kulwant Todd MD PhD  Authorized by: Kulwant Todd MD PhD    Preanesthetic Checklist  Completed: patient identified, IV checked, site marked, risks and benefits discussed, surgical consent, monitors and equipment checked, pre-op evaluation and timeout performed  Peripheral Block  Patient position: supine  Prep: ChloraPrep  Patient monitoring: frequent blood pressure checks, continuous pulse oximetry and heart rate  Block type: Femoral  Laterality: right  Injection technique: single-shot  Procedures: ultrasound guided, Ultrasound guidance required for the procedure to increase accuracy and safety of medication placement and decrease risk of complications.  Ultrasound permanent image saved  bupivacaine (PF) (MARCAINE) 0.5 % injection 20 mL - Perineural   20 mL - 7/23/2025 11:28:00 AM  dexamethasone (DECADRON) injection 4 mg - Perineural   4 mg - 7/23/2025 11:28:00 AM  Needle  Needle type: Stimuplex   Needle gauge: 18 G  Needle length: 4 in  Needle localization: anatomical landmarks and ultrasound guidance (Image saved in PACS)  Assessment  Injection assessment: incremental injection, frequent aspiration, injected with ease, negative aspiration, negative for heart rate change, no paresthesia on injection, no symptoms of intraneural/intravenous injection and needle tip visualized at all times  Paresthesia pain: none  Post-procedure:  site cleaned  patient tolerated the procedure well with no immediate complications

## 2025-07-23 NOTE — ANESTHESIA PROCEDURE NOTES
Peripheral Block    Patient location during procedure: holding area  Start time: 7/23/2025 11:25 AM  Reason for block: at surgeon's request and post-op pain management  Staffing  Performed by: Kulwant Todd MD PhD  Authorized by: Kulwant Todd MD PhD    Preanesthetic Checklist  Completed: patient identified, IV checked, site marked, risks and benefits discussed, surgical consent, monitors and equipment checked, pre-op evaluation and timeout performed  Peripheral Block  Patient position: supine  Prep: ChloraPrep  Patient monitoring: frequent blood pressure checks, continuous pulse oximetry and heart rate  Block type: Popliteal  Laterality: right  Injection technique: single-shot  Procedures: ultrasound guided, Ultrasound guidance required for the procedure to increase accuracy and safety of medication placement and decrease risk of complications.  Ultrasound permanent image saved  bupivacaine (PF) (MARCAINE) 0.5 % injection 20 mL - Perineural   20 mL - 7/23/2025 11:25:00 AM  dexamethasone (DECADRON) injection 4 mg - Perineural   4 mg - 7/23/2025 11:25:00 AM  Needle  Needle type: Stimuplex   Needle gauge: 18 G  Needle length: 4 in  Needle localization: anatomical landmarks and ultrasound guidance (Ultrasound in PACS)  Assessment  Injection assessment: incremental injection, frequent aspiration, injected with ease, negative aspiration, negative for heart rate change, no paresthesia on injection, no symptoms of intraneural/intravenous injection and needle tip visualized at all times  Paresthesia pain: none  Post-procedure:  site cleaned  patient tolerated the procedure well with no immediate complications

## 2025-07-25 NOTE — ANESTHESIA POSTPROCEDURE EVALUATION
Post-Op Assessment Note    CV Status:  Stable    Pain management: inadequate       Mental Status:  Awake and alert   Hydration Status:  Stable   PONV Controlled:  None   Airway Patency:  Patent     Post Op Vitals Reviewed: Yes    No anethesia notable event occurred.    Staff: Anesthesiologist           Last Filed PACU Vitals:  Vitals Value Taken Time   Temp 97.6 °F (36.4 °C) 07/23/25 14:23   Pulse 87 07/23/25 15:19   /83 07/23/25 15:15   Resp 56 07/23/25 15:19   SpO2 96 % 07/23/25 15:18   Vitals shown include unfiled device data.    Modified Jesus:     Vitals Value Taken Time   Activity 2 07/23/25 15:15   Respiration 2 07/23/25 15:15   Circulation 2 07/23/25 15:15   Consciousness 2 07/23/25 15:15   Oxygen Saturation 2 07/23/25 15:15     Modified Jesus Score: 10        Patient has difficult to describe pain or aching feeling in his right site. Feels an urge to raise the leg. Desires a supplemental block. I will provide.

## 2025-07-25 NOTE — ANESTHESIA POSTPROCEDURE EVALUATION
Post-Op Assessment Note    CV Status:  Stable    Pain management: adequate       Mental Status:  Awake and alert   Hydration Status:  Stable   PONV Controlled:  None   Airway Patency:  Patent     Post Op Vitals Reviewed: Yes    No anethesia notable event occurred.    Staff: Anesthesiologist           Last Filed PACU Vitals:  Vitals Value Taken Time   Temp 97.6 °F (36.4 °C) 07/23/25 14:23   Pulse 87 07/23/25 15:19   /83 07/23/25 15:15   Resp 56 07/23/25 15:19   SpO2 96 % 07/23/25 15:18   Vitals shown include unfiled device data.    Modified Jesus:     Vitals Value Taken Time   Activity 2 07/23/25 15:15   Respiration 2 07/23/25 15:15   Circulation 2 07/23/25 15:15   Consciousness 2 07/23/25 15:15   Oxygen Saturation 2 07/23/25 15:15     Modified Jesus Score: 10        Patient still with odd sensation in the leg however does not describe it as pain per se. He feels pain control is adequate.

## 2025-08-01 ENCOUNTER — HOME CARE VISIT (OUTPATIENT)
Dept: HOME HEALTH SERVICES | Facility: HOME HEALTHCARE | Age: 75
End: 2025-08-01

## 2025-08-02 ENCOUNTER — HOME CARE VISIT (OUTPATIENT)
Dept: HOME HEALTH SERVICES | Facility: HOME HEALTHCARE | Age: 75
End: 2025-08-02
Payer: COMMERCIAL

## 2025-08-02 VITALS
BODY MASS INDEX: 31.16 KG/M2 | DIASTOLIC BLOOD PRESSURE: 85 MMHG | SYSTOLIC BLOOD PRESSURE: 155 MMHG | RESPIRATION RATE: 16 BRPM | OXYGEN SATURATION: 99 % | HEIGHT: 76 IN | HEART RATE: 74 BPM | TEMPERATURE: 98.3 F

## 2025-08-02 PROCEDURE — G0299 HHS/HOSPICE OF RN EA 15 MIN: HCPCS

## 2025-08-02 PROCEDURE — 400013 VN SOC

## 2025-08-04 ENCOUNTER — TELEPHONE (OUTPATIENT)
Dept: VASCULAR SURGERY | Facility: CLINIC | Age: 75
End: 2025-08-04

## 2025-08-05 ENCOUNTER — HOME CARE VISIT (OUTPATIENT)
Dept: HOME HEALTH SERVICES | Facility: HOME HEALTHCARE | Age: 75
End: 2025-08-05
Payer: COMMERCIAL

## 2025-08-05 VITALS
TEMPERATURE: 98 F | RESPIRATION RATE: 17 BRPM | HEART RATE: 59 BPM | SYSTOLIC BLOOD PRESSURE: 134 MMHG | DIASTOLIC BLOOD PRESSURE: 90 MMHG | OXYGEN SATURATION: 96 %

## 2025-08-05 PROCEDURE — G0299 HHS/HOSPICE OF RN EA 15 MIN: HCPCS

## 2025-08-06 ENCOUNTER — HOME CARE VISIT (OUTPATIENT)
Dept: HOME HEALTH SERVICES | Facility: HOME HEALTHCARE | Age: 75
End: 2025-08-06
Payer: COMMERCIAL

## 2025-08-06 VITALS — OXYGEN SATURATION: 98 % | HEART RATE: 78 BPM | DIASTOLIC BLOOD PRESSURE: 70 MMHG | SYSTOLIC BLOOD PRESSURE: 140 MMHG

## 2025-08-06 PROCEDURE — G0151 HHCP-SERV OF PT,EA 15 MIN: HCPCS

## 2025-08-07 ENCOUNTER — HOME CARE VISIT (OUTPATIENT)
Dept: HOME HEALTH SERVICES | Facility: HOME HEALTHCARE | Age: 75
End: 2025-08-07
Payer: COMMERCIAL

## 2025-08-07 VITALS
SYSTOLIC BLOOD PRESSURE: 136 MMHG | DIASTOLIC BLOOD PRESSURE: 90 MMHG | RESPIRATION RATE: 17 BRPM | OXYGEN SATURATION: 100 % | HEART RATE: 57 BPM | TEMPERATURE: 98.3 F

## 2025-08-07 PROCEDURE — G0299 HHS/HOSPICE OF RN EA 15 MIN: HCPCS

## 2025-08-08 ENCOUNTER — HOME CARE VISIT (OUTPATIENT)
Dept: HOME HEALTH SERVICES | Facility: HOME HEALTHCARE | Age: 75
End: 2025-08-08
Payer: COMMERCIAL

## 2025-08-08 ENCOUNTER — TELEPHONE (OUTPATIENT)
Age: 75
End: 2025-08-08

## 2025-08-08 ENCOUNTER — TELEMEDICINE (OUTPATIENT)
Dept: FAMILY MEDICINE CLINIC | Facility: CLINIC | Age: 75
End: 2025-08-08
Payer: COMMERCIAL

## 2025-08-08 VITALS — OXYGEN SATURATION: 99 % | HEART RATE: 71 BPM | DIASTOLIC BLOOD PRESSURE: 80 MMHG | SYSTOLIC BLOOD PRESSURE: 148 MMHG

## 2025-08-08 DIAGNOSIS — Z59.82 TRANSPORTATION INSECURITY: ICD-10-CM

## 2025-08-08 DIAGNOSIS — Z89.511 S/P BKA (BELOW KNEE AMPUTATION), RIGHT (HCC): Primary | ICD-10-CM

## 2025-08-08 DIAGNOSIS — F41.9 ANXIETY AND DEPRESSION: ICD-10-CM

## 2025-08-08 DIAGNOSIS — Z74.09 MOBILITY IMPAIRED: ICD-10-CM

## 2025-08-08 DIAGNOSIS — F32.A ANXIETY AND DEPRESSION: ICD-10-CM

## 2025-08-08 PROCEDURE — G0151 HHCP-SERV OF PT,EA 15 MIN: HCPCS

## 2025-08-08 PROCEDURE — 99495 TRANSJ CARE MGMT MOD F2F 14D: CPT | Performed by: FAMILY MEDICINE

## 2025-08-08 RX ORDER — ESCITALOPRAM OXALATE 20 MG/1
20 TABLET ORAL DAILY
Qty: 90 TABLET | Refills: 1 | Status: SHIPPED | OUTPATIENT
Start: 2025-08-08

## 2025-08-08 SDOH — ECONOMIC STABILITY - TRANSPORTATION SECURITY: TRANSPORTATION INSECURITY: Z59.82

## 2025-08-11 ENCOUNTER — HOME CARE VISIT (OUTPATIENT)
Dept: HOME HEALTH SERVICES | Facility: HOME HEALTHCARE | Age: 75
End: 2025-08-11
Payer: COMMERCIAL

## 2025-08-12 ENCOUNTER — HOME CARE VISIT (OUTPATIENT)
Dept: HOME HEALTH SERVICES | Facility: HOME HEALTHCARE | Age: 75
End: 2025-08-12
Payer: COMMERCIAL

## 2025-08-12 ENCOUNTER — DOCUMENTATION (OUTPATIENT)
Dept: FAMILY MEDICINE CLINIC | Facility: CLINIC | Age: 75
End: 2025-08-12

## 2025-08-13 ENCOUNTER — HOME CARE VISIT (OUTPATIENT)
Dept: HOME HEALTH SERVICES | Facility: HOME HEALTHCARE | Age: 75
End: 2025-08-13
Payer: COMMERCIAL

## 2025-08-13 ENCOUNTER — PATIENT OUTREACH (OUTPATIENT)
Dept: CASE MANAGEMENT | Facility: OTHER | Age: 75
End: 2025-08-13

## 2025-08-14 ENCOUNTER — HOME CARE VISIT (OUTPATIENT)
Dept: HOME HEALTH SERVICES | Facility: HOME HEALTHCARE | Age: 75
End: 2025-08-14
Payer: COMMERCIAL

## 2025-08-18 ENCOUNTER — HOME CARE VISIT (OUTPATIENT)
Dept: HOME HEALTH SERVICES | Facility: HOME HEALTHCARE | Age: 75
End: 2025-08-18
Payer: COMMERCIAL

## 2025-08-18 PROCEDURE — G0152 HHCP-SERV OF OT,EA 15 MIN: HCPCS

## 2025-08-19 ENCOUNTER — HOME CARE VISIT (OUTPATIENT)
Dept: HOME HEALTH SERVICES | Facility: HOME HEALTHCARE | Age: 75
End: 2025-08-19
Payer: COMMERCIAL

## 2025-08-19 VITALS — HEART RATE: 83 BPM | OXYGEN SATURATION: 99 %

## 2025-08-19 VITALS — DIASTOLIC BLOOD PRESSURE: 80 MMHG | SYSTOLIC BLOOD PRESSURE: 180 MMHG | OXYGEN SATURATION: 97 % | HEART RATE: 70 BPM

## 2025-08-19 VITALS
DIASTOLIC BLOOD PRESSURE: 84 MMHG | OXYGEN SATURATION: 97 % | HEART RATE: 73 BPM | TEMPERATURE: 97.4 F | RESPIRATION RATE: 17 BRPM | SYSTOLIC BLOOD PRESSURE: 134 MMHG

## 2025-08-19 PROCEDURE — G0299 HHS/HOSPICE OF RN EA 15 MIN: HCPCS

## 2025-08-19 PROCEDURE — G0151 HHCP-SERV OF PT,EA 15 MIN: HCPCS

## 2025-08-20 ENCOUNTER — OFFICE VISIT (OUTPATIENT)
Dept: VASCULAR SURGERY | Facility: CLINIC | Age: 75
End: 2025-08-20

## 2025-08-20 VITALS
RESPIRATION RATE: 16 BRPM | OXYGEN SATURATION: 98 % | SYSTOLIC BLOOD PRESSURE: 122 MMHG | DIASTOLIC BLOOD PRESSURE: 78 MMHG | HEART RATE: 65 BPM

## 2025-08-20 DIAGNOSIS — Z89.511 S/P BKA (BELOW KNEE AMPUTATION), RIGHT (HCC): Primary | ICD-10-CM

## 2025-08-20 PROCEDURE — 99024 POSTOP FOLLOW-UP VISIT: CPT | Performed by: NURSE PRACTITIONER

## 2025-08-21 ENCOUNTER — HOME CARE VISIT (OUTPATIENT)
Dept: HOME HEALTH SERVICES | Facility: HOME HEALTHCARE | Age: 75
End: 2025-08-21
Payer: COMMERCIAL

## 2025-08-21 VITALS
TEMPERATURE: 97.5 F | HEART RATE: 54 BPM | OXYGEN SATURATION: 97 % | DIASTOLIC BLOOD PRESSURE: 74 MMHG | SYSTOLIC BLOOD PRESSURE: 132 MMHG | RESPIRATION RATE: 17 BRPM

## 2025-08-21 PROCEDURE — G0299 HHS/HOSPICE OF RN EA 15 MIN: HCPCS

## 2025-08-22 ENCOUNTER — HOME CARE VISIT (OUTPATIENT)
Dept: HOME HEALTH SERVICES | Facility: HOME HEALTHCARE | Age: 75
End: 2025-08-22
Payer: COMMERCIAL

## 2025-08-22 VITALS — DIASTOLIC BLOOD PRESSURE: 76 MMHG | SYSTOLIC BLOOD PRESSURE: 142 MMHG | HEART RATE: 88 BPM | OXYGEN SATURATION: 98 %

## 2025-08-22 PROCEDURE — G0151 HHCP-SERV OF PT,EA 15 MIN: HCPCS

## (undated) DEVICE — 2000CC GUARDIAN II: Brand: GUARDIAN

## (undated) DEVICE — RULER 4022800 10PK MICRO-GRID GREEN SIL

## (undated) DEVICE — DRESSING MAXORB EXTRA AG 4 IN X 4.75 IN

## (undated) DEVICE — SUT MONOCRYL 5-0 P-3 18 IN Y493G

## (undated) DEVICE — THE SIMPULSE SOLO SYSTEM WITH ULTREX RETRACTABLE SPLASH SHIELD TIP: Brand: SIMPULSE SOLO

## (undated) DEVICE — ABDOMINAL PAD: Brand: DERMACEA

## (undated) DEVICE — MICROCLIP GEM TI 2.4-3.1MM

## (undated) DEVICE — BLADE SAGITTAL 25.6 X 9.5MM

## (undated) DEVICE — CAST PADDING 4 IN SYNTHETIC NON-STRL

## (undated) DEVICE — TRAY FOLEY 16FR URIMETER SILICONE SURESTEP

## (undated) DEVICE — GLOVE SRG BIOGEL 6.5

## (undated) DEVICE — TRAY FOLEY 16FR SURESTEP TEMP SENS URIMETER STAT LOK

## (undated) DEVICE — CULTURE TUBE ANAEROBIC

## (undated) DEVICE — CURITY NON-ADHERENT STRIPS: Brand: CURITY

## (undated) DEVICE — INTENDED FOR TISSUE SEPARATION, AND OTHER PROCEDURES THAT REQUIRE A SHARP SURGICAL BLADE TO PUNCTURE OR CUT.: Brand: BARD-PARKER ® CARBON RIB-BACK BLADES

## (undated) DEVICE — STOCKINETTE REGULAR

## (undated) DEVICE — SUT ETHILON 3-0 FS-1 18 IN 663G

## (undated) DEVICE — ACE WRAP 4 IN UNSTERILE

## (undated) DEVICE — CUFF TOURNIQUET 18 X 4 IN QUICK CONNECT DISP 1 BLADDER

## (undated) DEVICE — PLUMEPEN PRO 10FT

## (undated) DEVICE — CULTURE TUBE AEROBIC

## (undated) DEVICE — GLOVE INDICATOR PI UNDERGLOVE SZ 8.5 BLUE

## (undated) DEVICE — SUT ETHILON 4-0 PS-2 18 IN 1667H

## (undated) DEVICE — 3M™ DURAPORE™ SURGICAL TAPE 1538-3, 3 INCH X 10 YARD (7,5CM X 9,1M), 4 ROLLS/BOX: Brand: 3M™ DURAPORE™

## (undated) DEVICE — POV-IOD SOLUTION 4OZ BT

## (undated) DEVICE — NEEDLE 18 G X 1 1/2

## (undated) DEVICE — INTENDED FOR TISSUE SEPARATION, AND OTHER PROCEDURES THAT REQUIRE A SHARP SURGICAL BLADE TO PUNCTURE OR CUT.: Brand: BARD-PARKER SAFETY BLADES SIZE 15, STERILE

## (undated) DEVICE — SPONGE STICK WITH PVP-I: Brand: KENDALL

## (undated) DEVICE — NEEDLE 25G X 1 1/2

## (undated) DEVICE — SCD SEQUENTIAL COMPRESSION COMFORT SLEEVE MEDIUM KNEE LENGTH: Brand: KENDALL SCD

## (undated) DEVICE — BIPOLAR CORD DISP

## (undated) DEVICE — GLOVE INDICATOR PI UNDERGLOVE SZ 7.5 BLUE

## (undated) DEVICE — GLOVE SRG BIOGEL ECLIPSE 8

## (undated) DEVICE — DRAPE MICROSCOPE OPMI PENTERO

## (undated) DEVICE — STRETCH BANDAGE: Brand: CURITY

## (undated) DEVICE — MAYO STAND COVER: Brand: CONVERTORS

## (undated) DEVICE — 3M™ V.A.C.® GRANUFOAM™ DRESSING KIT, M8275052, MEDIUM: Brand: 3M™ V.A.C.® GRANUFOAM™

## (undated) DEVICE — MEDIUM ROCKER SHOE - LONG LRF 140MM - 155MM: Brand: HOFFMANN

## (undated) DEVICE — KERLIX BANDAGE ROLL: Brand: KERLIX

## (undated) DEVICE — SURGICAL GOWN, XL SMARTSLEEVE: Brand: CONVERTORS

## (undated) DEVICE — VAC CANISTER 500ML

## (undated) DEVICE — UNIVERSAL MAJOR EXTREMITY,KIT: Brand: CARDINAL HEALTH

## (undated) DEVICE — SYRINGE 10ML LL

## (undated) DEVICE — ACE WRAP 4 IN STERILE

## (undated) DEVICE — ULTRASOUND GEL STERILE FOIL PK

## (undated) DEVICE — OCCLUSIVE GAUZE STRIP,3% BISMUTH TRIBROMOPHENATE IN PETROLATUM BLEND: Brand: XEROFORM

## (undated) DEVICE — CURITY STRETCH BANDAGE: Brand: CURITY

## (undated) DEVICE — NEEDLE 18 G X 1 1/2 SAFETY

## (undated) DEVICE — SUT ETHILON 4-0 PS-2 18 IN 1667G

## (undated) DEVICE — SPONGE LAP 18 X 18 IN STRL RFD

## (undated) DEVICE — GLOVE INDICATOR PI UNDERGLOVE SZ 6.5 BLUE

## (undated) DEVICE — INTENDED FOR TISSUE SEPARATION, AND OTHER PROCEDURES THAT REQUIRE A SHARP SURGICAL BLADE TO PUNCTURE OR CUT.: Brand: BARD-PARKER SAFETY BLADES SIZE 10, STERILE

## (undated) DEVICE — DRAPE C-ARMOUR

## (undated) DEVICE — HOOK ELASTIC STAY 12MM BLUNT SNGL STRL

## (undated) DEVICE — CHLORAPREP HI-LITE 26ML ORANGE

## (undated) DEVICE — DRESSING XEROFORM 5 X 9

## (undated) DEVICE — TIBURON EXTREMITY SHEET: Brand: CONVERTORS

## (undated) DEVICE — TIBURON SPLIT SHEET: Brand: CONVERTORS

## (undated) DEVICE — 40601 PROLONGED POSITIONING SYSTEM: Brand: 40601 PROLONGED POSITIONING SYSTEM

## (undated) DEVICE — ADHESIVE SKIN HIGH VISCOSITY EXOFIN 1ML

## (undated) DEVICE — PENCIL ELECTROSURG E-Z CLEAN -0035H

## (undated) DEVICE — BETHLEHEM UNIV MAJ EXT ,KIT: Brand: CARDINAL HEALTH

## (undated) DEVICE — ELECTRODE BLADE MOD E-Z CLEAN  2.75IN 7CM -0012AM

## (undated) DEVICE — SKN PREP SPNG STKS PVP PNT STR: Brand: MEDLINE INDUSTRIES, INC.

## (undated) DEVICE — 10FR FRAZIER SUCTION HANDLE: Brand: CARDINAL HEALTH

## (undated) DEVICE — DRM5 9-0 BK MONO NYL 5"/13 CM: Brand: DRM5 9-0 BK MONO NYL 5"/13 CM

## (undated) DEVICE — VESSEL LOOPS X-RAY DETECTABLE: Brand: DEROYAL

## (undated) DEVICE — GLOVE INDICATOR PI UNDERGLOVE SZ 7 BLUE

## (undated) DEVICE — THE FLOWCOUPLER SYSTEM CONSISTS OF A FLOWCOUPLER DEVICE AND A FLOWCOUPLER MONITOR. THE FLOWCOUPLER DEVICE INCLUDES A 20MHZ ULTRASONIC DOPPLER TRANSDUCER (PROBE) ATTACHED TO ONE OF THE FLOWCOUPLER RINGS, AND AN EXTERNAL LEAD. THE FLOWCOUPLER RINGS ARE MADE OF HIGH DENSITY POLYETHYLENE AND SURGICAL GRADE STAINLESS STEEL PINS. A PROTECTIVE COVER AND JAW ASSEMBLY PROTECT THE RINGS AND PROBE WHICH ALLOW FOR EASY LOADING ONTO THE ANASTOMOTIC INSTRUMENT. BOTH THE PROTECTIVE COVER AND JAW ASSEMBLY ARE DISPOSABLE.
Type: IMPLANTABLE DEVICE | Site: FOOT | Status: NON-FUNCTIONAL
Brand: GEM FLOW COUPLER
Removed: 2021-04-13

## (undated) DEVICE — DISSECTOR ACE BLADE 700 MEGADYNE 25IN STD

## (undated) DEVICE — STERILE MUSCLE FLAP PACK: Brand: CARDINAL HEALTH

## (undated) DEVICE — HANDPIECE SET WITH RETRACTABLE COAXIAL FAN SPRAY TIP AND SUCTION TUBE: Brand: INTERPULSE

## (undated) DEVICE — 450 ML BOTTLE OF 0.05% CHLORHEXIDINE GLUCONATE IN 99.95% STERILE WATER FOR IRRIGATION, USP AND APPLICATOR.: Brand: IRRISEPT ANTIMICROBIAL WOUND LAVAGE

## (undated) DEVICE — GLOVE SRG BIOGEL 7.5

## (undated) DEVICE — Device

## (undated) DEVICE — SURGIFOAM 8.5 X 12.5

## (undated) DEVICE — 3M™ TEGADERM™ TRANSPARENT FILM DRESSING FRAME STYLE, 1626W, 4 IN X 4-3/4 IN (10 CM X 12 CM), 50/CT 4CT/CASE: Brand: 3M™ TEGADERM™

## (undated) DEVICE — LIGACLIP MCA MULTIPLE CLIP APPLIERS, 20 SMALL CLIPS: Brand: LIGACLIP

## (undated) DEVICE — GAUZE SPONGES,16 PLY: Brand: CURITY

## (undated) DEVICE — TUBING SUCTION 5MM X 12 FT

## (undated) DEVICE — GLOVE SRG BIOGEL 8

## (undated) DEVICE — SPONGE LAP 18 X 18 IN

## (undated) DEVICE — 3M™ STERI-STRIP™ REINFORCED ADHESIVE SKIN CLOSURES, R1546, 1/4 IN X 4 IN (6 MM X 100 MM), 10 STRIPS/ENVELOPE: Brand: 3M™ STERI-STRIP™

## (undated) DEVICE — BETHLEHEM UNIVERSAL  MIONR EXT: Brand: CARDINAL HEALTH

## (undated) DEVICE — SKIN MARKER DUAL TIP WITH RULER CAP, FLEXIBLE RULER AND LABELS: Brand: DEVON

## (undated) DEVICE — PAD GROUNDING ADULT

## (undated) DEVICE — SURGIFOAM 7 X 12 SPONGE ABS

## (undated) DEVICE — SUT NYLON 4-0 P-3 18 IN 699G

## (undated) DEVICE — CURITY PLAIN PACKING STRIP: Brand: CURITY

## (undated) DEVICE — Device: Brand: CANNULATED DRILL W/ ZH4.8MM X 200MM

## (undated) DEVICE — SYRINGE 3ML LL

## (undated) DEVICE — TAPE CAST 4IN FIBERGLASS 4YD WHITE

## (undated) DEVICE — CUFF TOURNIQUET 34 X 4 IN QUICK CONNECT DISP 1BLA

## (undated) DEVICE — DRAPE EQUIPMENT RF WAND

## (undated) DEVICE — WIPES INSTRUMENT 3 X 3IN MEROCEL

## (undated) DEVICE — KIRSCHNER WIRE
Type: IMPLANTABLE DEVICE | Site: FOOT | Status: NON-FUNCTIONAL
Removed: 2021-02-12

## (undated) DEVICE — PADDING CAST 4 IN  COTTON STRL

## (undated) DEVICE — 3M™ IOBAN™ 2 ANTIMICROBIAL INCISE DRAPE 6650EZ: Brand: IOBAN™ 2

## (undated) DEVICE — DRESSING OPTILOCK 6.5 X 10IN

## (undated) DEVICE — PULSAVAC TIP FAN SPRAY W/SHIELD

## (undated) DEVICE — MEDI-VAC NON-CONDUCTIVE SUCTION TUBING 6MM X 1.8M (6FT.) L: Brand: CARDINAL HEALTH

## (undated) DEVICE — DRAPE SURGIKIT SADDLE BAG

## (undated) DEVICE — PACK UNIVERSAL DRAPES SUB-Q ICD

## (undated) DEVICE — HEMOCLIP CARTRIDGE MED

## (undated) DEVICE — MICROCLIP HEMOSTATIC SUPERFINE TI

## (undated) DEVICE — X-RAY DETECTABLE SPONGES,16 PLY: Brand: VISTEC

## (undated) DEVICE — SUT CHROMIC 3-0 SH-1 27 IN G182H

## (undated) DEVICE — SUT VICRYL 3-0 PS-2 27 IN J427H

## (undated) DEVICE — DRAPE C-ARM X-RAY

## (undated) DEVICE — 3000CC GUARDIAN II: Brand: GUARDIAN

## (undated) DEVICE — 3M™ TEGADERM™ TRANSPARENT FILM DRESSING FRAME STYLE, 1628, 6 IN X 8 IN (15 CM X 20 CM), 10/CT 8CT/CASE: Brand: 3M™ TEGADERM™

## (undated) DEVICE — UTILITY MARKER,BLACK WITH LABELS: Brand: DEVON

## (undated) DEVICE — GLOVE SRG BIOGEL ECLIPSE 8.5

## (undated) DEVICE — MEDI-VAC YANK SUCT HNDL W/TPRD BULBOUS TIP: Brand: CARDINAL HEALTH

## (undated) DEVICE — SUT VICRYL 3-0 SH 27 IN J416H

## (undated) DEVICE — SUT VICRYL 4-0 PS-2 27 IN J426H

## (undated) DEVICE — SUT VICRYL 3-0 REEL 54 IN J285G

## (undated) DEVICE — APEX PIN BOLT LRF FOR PIN DIA 3-4-5-6MM: Brand: HOFFMANN

## (undated) DEVICE — CYSTO TUBING SINGLE IRRIGATION

## (undated) DEVICE — GAUZE SPONGES,USP TYPE VII GAUZE, 12 PLY: Brand: CURITY

## (undated) DEVICE — PAD CAST 4 IN COTTON NON STERILE

## (undated) DEVICE — ACE WRAP 6 IN UNSTERILE

## (undated) DEVICE — ELECTRODE EZ CLEAN BLADE -0012

## (undated) DEVICE — WET SKIN PREP TRAY: Brand: MEDLINE INDUSTRIES, INC.

## (undated) DEVICE — SPONGE 4 X 4 XRAY 16 PLY STRL LF RFD

## (undated) DEVICE — GLOVE SRG BIOGEL ECLIPSE 7.5

## (undated) DEVICE — SUT ETHILON 2-0 FSLX 30 IN 1674H

## (undated) DEVICE — RAZOR STERILE

## (undated) DEVICE — STAPLER INSORB SUBCUTICULAR 30 SINGLE USE

## (undated) DEVICE — PART NUMBER 108280, VTI 8 MHZ DISPOSABLE DOPPLER PROBE,  BOX: Brand: VTI 8 MHZ DISPOSABLE DOPPLER PROBE, BOX

## (undated) DEVICE — IODOFORM PACKING STRIP: Brand: CURITY

## (undated) DEVICE — DRESSING MEPILEX AG BORDER 4 X 8 IN

## (undated) DEVICE — PROXIMATE SKIN STAPLERS (35 WIDE) CONTAINS 35 STAINLESS STEEL STAPLES (FIXED HEAD): Brand: PROXIMATE

## (undated) DEVICE — DRESSING BIOPATCH ANTIMICROBIAL 1 IN DISC

## (undated) DEVICE — DRESSING MEPILEX AG BORDER 4 X 12 IN

## (undated) DEVICE — OPENING DRILL

## (undated) DEVICE — LIGHT GLOVE GREEN

## (undated) DEVICE — DRESSING PRISMA MATRIX 19.1IN X 19.1IN

## (undated) DEVICE — SPONGE PVP SCRUB WING STERILE

## (undated) DEVICE — DRAPE FLUID WARMER (BIRD BATH)

## (undated) DEVICE — DRESSING MEPILEX AG BORDER 4 X 4 IN

## (undated) DEVICE — DISPOSABLE EQUIPMENT COVER: Brand: SMALL TOWEL DRAPE

## (undated) DEVICE — HEMOCLIP CARTRIDGE SM

## (undated) DEVICE — SCALED DRILL BIT, AO FITTING

## (undated) DEVICE — Device: Brand: GUIDE PIN DRILL TIP2.8MM X 300MM

## (undated) DEVICE — BLADE SAGITTAL 63.0 X 19.5MM

## (undated) DEVICE — MASTISOL LIQ ADHESIVE 2/3ML

## (undated) DEVICE — SPY ELITE SINGLE VIAL KIT

## (undated) DEVICE — PREP SURGICAL PURPREP 26ML

## (undated) DEVICE — BETHLEHEM UNIVERSAL OUTPATIENT: Brand: CARDINAL HEALTH

## (undated) DEVICE — SPECIMEN CONTAINER: Brand: CARDINAL HEALTH

## (undated) DEVICE — GLOVE SRG BIOGEL 7

## (undated) DEVICE — WEBRIL 6 IN UNSTERILE